# Patient Record
Sex: MALE | Race: WHITE | HISPANIC OR LATINO | Employment: OTHER | URBAN - METROPOLITAN AREA
[De-identification: names, ages, dates, MRNs, and addresses within clinical notes are randomized per-mention and may not be internally consistent; named-entity substitution may affect disease eponyms.]

---

## 2018-06-27 ENCOUNTER — TRANSFERRED RECORDS (OUTPATIENT)
Dept: HEALTH INFORMATION MANAGEMENT | Facility: CLINIC | Age: 58
End: 2018-06-27

## 2018-07-02 ENCOUNTER — TRANSFERRED RECORDS (OUTPATIENT)
Dept: HEALTH INFORMATION MANAGEMENT | Facility: CLINIC | Age: 58
End: 2018-07-02

## 2018-07-16 ENCOUNTER — APPOINTMENT (OUTPATIENT)
Dept: GENERAL RADIOLOGY | Facility: CLINIC | Age: 58
DRG: 270 | End: 2018-07-16
Attending: INTERNAL MEDICINE
Payer: COMMERCIAL

## 2018-07-16 ENCOUNTER — HOSPITAL ENCOUNTER (INPATIENT)
Facility: CLINIC | Age: 58
LOS: 8 days | Discharge: HOME | DRG: 270 | End: 2018-07-24
Attending: INTERNAL MEDICINE | Admitting: INTERNAL MEDICINE
Payer: COMMERCIAL

## 2018-07-16 DIAGNOSIS — E11.65 TYPE 2 DIABETES MELLITUS WITH HYPERGLYCEMIA, WITH LONG-TERM CURRENT USE OF INSULIN (H): ICD-10-CM

## 2018-07-16 DIAGNOSIS — I21.3 ST ELEVATION MYOCARDIAL INFARCTION (STEMI), UNSPECIFIED ARTERY (H): ICD-10-CM

## 2018-07-16 DIAGNOSIS — I50.21 ACUTE SYSTOLIC HEART FAILURE (H): Primary | ICD-10-CM

## 2018-07-16 DIAGNOSIS — Z79.4 TYPE 2 DIABETES MELLITUS WITH HYPERGLYCEMIA, WITH LONG-TERM CURRENT USE OF INSULIN (H): ICD-10-CM

## 2018-07-16 LAB
ALBUMIN SERPL-MCNC: 2.5 G/DL (ref 3.4–5)
ALP SERPL-CCNC: 170 U/L (ref 40–150)
ALT SERPL W P-5'-P-CCNC: 153 U/L (ref 0–70)
ANION GAP SERPL CALCULATED.3IONS-SCNC: 11 MMOL/L (ref 3–14)
APTT PPP: 76 SEC (ref 22–37)
AST SERPL W P-5'-P-CCNC: 100 U/L (ref 0–45)
BASE EXCESS BLDA CALC-SCNC: 17.4 MMOL/L
BASE EXCESS BLDV CALC-SCNC: 18.5 MMOL/L
BILIRUB SERPL-MCNC: 0.9 MG/DL (ref 0.2–1.3)
BUN SERPL-MCNC: 86 MG/DL (ref 7–30)
CALCIUM SERPL-MCNC: 8.9 MG/DL (ref 8.5–10.1)
CHLORIDE SERPL-SCNC: 87 MMOL/L (ref 94–109)
CO2 SERPL-SCNC: 35 MMOL/L (ref 20–32)
CREAT SERPL-MCNC: 2.79 MG/DL (ref 0.66–1.25)
CREAT SERPL-MCNC: 2.95 MG/DL (ref 0.7–1.2)
ERYTHROCYTE [DISTWIDTH] IN BLOOD BY AUTOMATED COUNT: 13.5 % (ref 10–15)
GFR SERPL CREATININE-BSD FRML MDRD: 22 ML/MIN/1.73M2
GFR SERPL CREATININE-BSD FRML MDRD: 24 ML/MIN/1.7M2
GLUCOSE BLDC GLUCOMTR-MCNC: 276 MG/DL (ref 70–99)
GLUCOSE SERPL-MCNC: 142 MG/DL (ref 70–100)
GLUCOSE SERPL-MCNC: 314 MG/DL (ref 70–99)
HCO3 BLD-SCNC: 42 MMOL/L (ref 21–28)
HCO3 BLDV-SCNC: 45 MMOL/L (ref 21–28)
HCT VFR BLD AUTO: 35.5 % (ref 40–53)
HGB BLD-MCNC: 11.5 G/DL (ref 13.3–17.7)
INR PPP: 1.22 (ref 0.86–1.14)
LMWH PPP CHRO-ACNC: 0.23 IU/ML
MAGNESIUM SERPL-MCNC: 2.7 MG/DL (ref 1.6–2.3)
MCH RBC QN AUTO: 29.2 PG (ref 26.5–33)
MCHC RBC AUTO-ENTMCNC: 32.4 G/DL (ref 31.5–36.5)
MCV RBC AUTO: 90 FL (ref 78–100)
NT-PROBNP SERPL-MCNC: 5062 PG/ML (ref 0–900)
O2/TOTAL GAS SETTING VFR VENT: ABNORMAL %
O2/TOTAL GAS SETTING VFR VENT: ABNORMAL %
PCO2 BLD: 50 MM HG (ref 35–45)
PCO2 BLDV: 58 MM HG (ref 40–50)
PH BLD: 7.54 PH (ref 7.35–7.45)
PH BLDV: 7.49 PH (ref 7.32–7.43)
PLATELET # BLD AUTO: 245 10E9/L (ref 150–450)
PO2 BLD: 99 MM HG (ref 80–105)
PO2 BLDV: 33 MM HG (ref 25–47)
POTASSIUM SERPL-SCNC: 3.9 MEQ/L (ref 3.4–5.1)
POTASSIUM SERPL-SCNC: 4 MMOL/L (ref 3.4–5.3)
PROT SERPL-MCNC: 7.2 G/DL (ref 6.8–8.8)
RBC # BLD AUTO: 3.94 10E12/L (ref 4.4–5.9)
SODIUM SERPL-SCNC: 133 MMOL/L (ref 133–144)
TROPONIN I SERPL-MCNC: 1.46 UG/L (ref 0–0.04)
WBC # BLD AUTO: 9.6 10E9/L (ref 4–11)

## 2018-07-16 PROCEDURE — 82803 BLOOD GASES ANY COMBINATION: CPT | Performed by: STUDENT IN AN ORGANIZED HEALTH CARE EDUCATION/TRAINING PROGRAM

## 2018-07-16 PROCEDURE — 74018 RADEX ABDOMEN 1 VIEW: CPT

## 2018-07-16 PROCEDURE — 25000128 H RX IP 250 OP 636: Performed by: INTERNAL MEDICINE

## 2018-07-16 PROCEDURE — 99223 1ST HOSP IP/OBS HIGH 75: CPT | Mod: AI | Performed by: INTERNAL MEDICINE

## 2018-07-16 PROCEDURE — 25000132 ZZH RX MED GY IP 250 OP 250 PS 637: Performed by: STUDENT IN AN ORGANIZED HEALTH CARE EDUCATION/TRAINING PROGRAM

## 2018-07-16 PROCEDURE — 85610 PROTHROMBIN TIME: CPT | Performed by: STUDENT IN AN ORGANIZED HEALTH CARE EDUCATION/TRAINING PROGRAM

## 2018-07-16 PROCEDURE — 40000081 ZZH STATISTIC INSERT IABP

## 2018-07-16 PROCEDURE — 20000004 ZZH R&B ICU UMMC

## 2018-07-16 PROCEDURE — 25000128 H RX IP 250 OP 636: Performed by: STUDENT IN AN ORGANIZED HEALTH CARE EDUCATION/TRAINING PROGRAM

## 2018-07-16 PROCEDURE — 83036 HEMOGLOBIN GLYCOSYLATED A1C: CPT | Performed by: STUDENT IN AN ORGANIZED HEALTH CARE EDUCATION/TRAINING PROGRAM

## 2018-07-16 PROCEDURE — 3E043XZ INTRODUCTION OF VASOPRESSOR INTO CENTRAL VEIN, PERCUTANEOUS APPROACH: ICD-10-PCS | Performed by: INTERNAL MEDICINE

## 2018-07-16 PROCEDURE — 71045 X-RAY EXAM CHEST 1 VIEW: CPT

## 2018-07-16 PROCEDURE — 40000281 ZZH STATISTIC TRANSPORT TIME EA 15 MIN

## 2018-07-16 PROCEDURE — 83880 ASSAY OF NATRIURETIC PEPTIDE: CPT | Performed by: STUDENT IN AN ORGANIZED HEALTH CARE EDUCATION/TRAINING PROGRAM

## 2018-07-16 PROCEDURE — 84484 ASSAY OF TROPONIN QUANT: CPT | Performed by: STUDENT IN AN ORGANIZED HEALTH CARE EDUCATION/TRAINING PROGRAM

## 2018-07-16 PROCEDURE — 85730 THROMBOPLASTIN TIME PARTIAL: CPT | Performed by: STUDENT IN AN ORGANIZED HEALTH CARE EDUCATION/TRAINING PROGRAM

## 2018-07-16 PROCEDURE — 40000076 ZZH STATISTIC IABP MONITORING

## 2018-07-16 PROCEDURE — 25000125 ZZHC RX 250: Performed by: INTERNAL MEDICINE

## 2018-07-16 PROCEDURE — 80053 COMPREHEN METABOLIC PANEL: CPT | Performed by: STUDENT IN AN ORGANIZED HEALTH CARE EDUCATION/TRAINING PROGRAM

## 2018-07-16 PROCEDURE — 85027 COMPLETE CBC AUTOMATED: CPT | Performed by: STUDENT IN AN ORGANIZED HEALTH CARE EDUCATION/TRAINING PROGRAM

## 2018-07-16 PROCEDURE — 85520 HEPARIN ASSAY: CPT | Performed by: STUDENT IN AN ORGANIZED HEALTH CARE EDUCATION/TRAINING PROGRAM

## 2018-07-16 PROCEDURE — 40000275 ZZH STATISTIC RCP TIME EA 10 MIN

## 2018-07-16 PROCEDURE — 00000146 ZZHCL STATISTIC GLUCOSE BY METER IP

## 2018-07-16 PROCEDURE — 83735 ASSAY OF MAGNESIUM: CPT | Performed by: STUDENT IN AN ORGANIZED HEALTH CARE EDUCATION/TRAINING PROGRAM

## 2018-07-16 PROCEDURE — 5A02210 ASSISTANCE WITH CARDIAC OUTPUT USING BALLOON PUMP, CONTINUOUS: ICD-10-PCS | Performed by: INTERNAL MEDICINE

## 2018-07-16 PROCEDURE — 84550 ASSAY OF BLOOD/URIC ACID: CPT | Performed by: STUDENT IN AN ORGANIZED HEALTH CARE EDUCATION/TRAINING PROGRAM

## 2018-07-16 RX ORDER — DEXTROSE MONOHYDRATE 25 G/50ML
25-50 INJECTION, SOLUTION INTRAVENOUS
Status: DISCONTINUED | OUTPATIENT
Start: 2018-07-16 | End: 2018-07-24 | Stop reason: HOSPADM

## 2018-07-16 RX ORDER — ACETAMINOPHEN 325 MG/1
650 TABLET ORAL EVERY 4 HOURS PRN
Status: DISCONTINUED | OUTPATIENT
Start: 2018-07-16 | End: 2018-07-24 | Stop reason: HOSPADM

## 2018-07-16 RX ORDER — HEPARIN SODIUM 10000 [USP'U]/100ML
0-3500 INJECTION, SOLUTION INTRAVENOUS CONTINUOUS
Status: DISCONTINUED | OUTPATIENT
Start: 2018-07-16 | End: 2018-07-16

## 2018-07-16 RX ORDER — AMOXICILLIN 250 MG
2 CAPSULE ORAL 2 TIMES DAILY
Status: DISCONTINUED | OUTPATIENT
Start: 2018-07-16 | End: 2018-07-16

## 2018-07-16 RX ORDER — AMOXICILLIN 250 MG
1 CAPSULE ORAL 2 TIMES DAILY
Status: DISCONTINUED | OUTPATIENT
Start: 2018-07-16 | End: 2018-07-16

## 2018-07-16 RX ORDER — ALUMINA, MAGNESIA, AND SIMETHICONE 2400; 2400; 240 MG/30ML; MG/30ML; MG/30ML
30 SUSPENSION ORAL EVERY 4 HOURS PRN
Status: DISCONTINUED | OUTPATIENT
Start: 2018-07-16 | End: 2018-07-16

## 2018-07-16 RX ORDER — CLOPIDOGREL BISULFATE 75 MG/1
75 TABLET ORAL DAILY
Status: DISCONTINUED | OUTPATIENT
Start: 2018-07-17 | End: 2018-07-24 | Stop reason: HOSPADM

## 2018-07-16 RX ORDER — HEPARIN SODIUM 10000 [USP'U]/100ML
0-3500 INJECTION, SOLUTION INTRAVENOUS CONTINUOUS
Status: DISCONTINUED | OUTPATIENT
Start: 2018-07-16 | End: 2018-07-18

## 2018-07-16 RX ORDER — NICOTINE POLACRILEX 4 MG
15-30 LOZENGE BUCCAL
Status: DISCONTINUED | OUTPATIENT
Start: 2018-07-16 | End: 2018-07-24 | Stop reason: HOSPADM

## 2018-07-16 RX ORDER — FAMOTIDINE 20 MG/1
20 TABLET, FILM COATED ORAL 2 TIMES DAILY
Status: DISCONTINUED | OUTPATIENT
Start: 2018-07-16 | End: 2018-07-18

## 2018-07-16 RX ORDER — MILRINONE LACTATE 0.2 MG/ML
0.25 INJECTION, SOLUTION INTRAVENOUS CONTINUOUS
Status: DISCONTINUED | OUTPATIENT
Start: 2018-07-16 | End: 2018-07-16

## 2018-07-16 RX ORDER — ONDANSETRON 4 MG/1
4 TABLET, ORALLY DISINTEGRATING ORAL EVERY 6 HOURS PRN
Status: DISCONTINUED | OUTPATIENT
Start: 2018-07-16 | End: 2018-07-16

## 2018-07-16 RX ORDER — ONDANSETRON 2 MG/ML
4 INJECTION INTRAMUSCULAR; INTRAVENOUS EVERY 6 HOURS PRN
Status: DISCONTINUED | OUTPATIENT
Start: 2018-07-16 | End: 2018-07-16

## 2018-07-16 RX ORDER — DOXYCYCLINE 100 MG/1
100 CAPSULE ORAL EVERY 12 HOURS SCHEDULED
Status: COMPLETED | OUTPATIENT
Start: 2018-07-16 | End: 2018-07-21

## 2018-07-16 RX ORDER — CEFDINIR 300 MG/1
300 CAPSULE ORAL EVERY 12 HOURS SCHEDULED
Status: COMPLETED | OUTPATIENT
Start: 2018-07-16 | End: 2018-07-21

## 2018-07-16 RX ORDER — LIDOCAINE 40 MG/G
CREAM TOPICAL
Status: DISCONTINUED | OUTPATIENT
Start: 2018-07-16 | End: 2018-07-24 | Stop reason: HOSPADM

## 2018-07-16 RX ADMIN — FUROSEMIDE 20 MG/HR: 10 INJECTION, SOLUTION INTRAVENOUS at 20:11

## 2018-07-16 RX ADMIN — CEFDINIR 300 MG: 300 CAPSULE ORAL at 22:15

## 2018-07-16 RX ADMIN — FAMOTIDINE 20 MG: 20 TABLET ORAL at 22:19

## 2018-07-16 RX ADMIN — AMIODARONE HYDROCHLORIDE 0.5 MG/MIN: 50 INJECTION, SOLUTION INTRAVENOUS at 20:12

## 2018-07-16 RX ADMIN — DOXYCYCLINE HYCLATE 100 MG: 100 CAPSULE ORAL at 22:14

## 2018-07-16 RX ADMIN — HEPARIN SODIUM 800 UNITS/HR: 10000 INJECTION, SOLUTION INTRAVENOUS at 20:00

## 2018-07-16 ASSESSMENT — ACTIVITIES OF DAILY LIVING (ADL)
DRESS: 0-->INDEPENDENT
SWALLOWING: 0-->SWALLOWS FOODS/LIQUIDS WITHOUT DIFFICULTY
FALL_HISTORY_WITHIN_LAST_SIX_MONTHS: NO
TOILETING: 0-->INDEPENDENT
COGNITION: 0 - NO COGNITION ISSUES REPORTED
RETIRED_COMMUNICATION: 0-->UNDERSTANDS/COMMUNICATES WITHOUT DIFFICULTY
TRANSFERRING: 0-->INDEPENDENT
AMBULATION: 0-->INDEPENDENT
RETIRED_EATING: 0-->INDEPENDENT
BATHING: 0-->INDEPENDENT

## 2018-07-16 NOTE — IP AVS SNAPSHOT
[address] [address]    Phone:  [phone]                                       After Visit Summary   7/16/2018    Mariusz Sharp    MRN: [id number]           After Visit Summary Signature Page     I have received my discharge instructions, and my questions have been answered. I have discussed any challenges I see with this plan with the nurse or doctor.    ..........................................................................................................................................  Patient/Patient Representative Signature      ..........................................................................................................................................  Patient Representative Print Name and Relationship to Patient    ..................................................               ................................................  Date                                            Time    ..........................................................................................................................................  Reviewed by Signature/Title    ...................................................              ..............................................  Date                                                            Time

## 2018-07-16 NOTE — IP AVS SNAPSHOT
MRN:[id number]                      After Visit Summary   7/16/2018    Mariusz Sharp    MRN: [id number]           Thank you!     Thank you for choosing [facility] for your care. Our goal is always to provide you with excellent care. Hearing back from our patients is one way we can continue to improve our services. Please take a few minutes to complete the written survey that you may receive in the mail after you visit with us. Thank you!        Patient Information     Date Of Birth          1960        Designated Caregiver       Most Recent Value    Caregiver    Will someone help with your care after discharge? yes    Name of designated caregiver Lisa    Phone number of caregiver [phone]    Caregiver address [place]      About your hospital stay     You were admitted on:  July 16, 2018 You last received care in the:  [address] [facility]    You were discharged on:  July 24, 2018        Reason for your hospital stay       Dear Mr. Sharp,    You were transferred to [facility] because you had a heart attack, which was complicated by heart failure leading to cardiogenic shock (when the heart doesn't pump enough blood to the rest of the body). We were able to stabilize you and get you started on your LVAD workup. We look forward to continuing to work with you. We will see you as an outpatient.     Thank you for your cooperation and partnership during this hospitalization.                  Who to Call     For medical emergencies, please call 911.  For non-urgent questions about your medical care, please call your primary care provider or clinic, [phone]          Attending Provider     Provider Specialty    Jenni Ortiz MD Cardiology       Primary Care Provider Office Phone # Fax #    [facility] [phone]      After Care Instructions     Activity       Your activity upon discharge: activity as tolerated            Diet       Follow  this diet upon discharge: Orders Placed This Encounter      Fluid restriction 2000 ML FLUID      Combination Diet 8445-1128 Calories: Moderate Consistent CHO (4-6 CHO units/meal); 2 gm NA Diet                  Follow-up Appointments     Adult [facility] Follow-up and recommended labs and tests       We will see you on 8/6 in heart failure clinic.     Appointments on [place] and/or [facility] (with [facility] or [facility] provider or service). Call [phone] if you haven't heard regarding these appointments within 7 days of discharge.            Follow Up and recommended labs and tests       ______________________________________________________    [facility]   [address]  [address]  [place] [address]  Phone: [phone]  Fax: [phone]    For INR and Warfarin monitoring (Goal=2-2.5).   Indication for Anticoagulation: Atrial Fibrillation and LV Thrombus.   Expected duration of therapy: Lifetime.    Like He to follow.     Appointment with Dr. Patricia Irving on 7/26/18 at 8:20 AM for INR lab draw, referral to INR nurse, and establishment of care.   ______________________________________________________            Follow Up and recommended labs and tests       You are scheduled to get routine labs on 8/6.                  Your next 10 appointments already scheduled     Aug 06, 2018  2:30 PM CDT   Lab with  LAB   [facility] ([facility])    [address]  1st [address]  [facility] [address] [phone]            Aug 06, 2018  3:00 PM CDT   (Arrive by 2:45 PM)   CORE NEW with Pippa Rodriguez NP   [facility] ([facility])    [address]  [address] [place] [address] [phone]              Additional Services     Cardiac Rehab Referral       *This therapy referral will be filtered to a centralized scheduling office at [facility] and the patient will receive a call to schedule  "an appointment at a [place] location most convenient for them.*     If you have not heard from the scheduling office within 2 business days, please call [phone] for all locations, with the exception of [place], please call [phone].    Please be aware that coverage of these services is subject to the terms and limitations of your health insurance plan.  Call member services at your health plan with any benefit or coverage questions.      **Note to Provider:  If you are referring outside of [place] for the therapy appointment, please list the name of the location in the \"special instructions\" above, print the referral and give to the patient to schedule the appointment.                         Further instructions from your care team       Diabetes Plan::  -Glargine: continue 20 units at bedtime ( lantus/glargine/basaglar, are all the same just different names)  -aspart ( Novolog) for meals and snacks:  1 unit per 12 grams of CHO  -Aspart ( Novolog)sliding scale  I   -179 give 1 unit.   -219 give 2 units.   -259 give 3 units.   -299 give 4 units.   -339 give 5 units.   BG >/= 340 give 6 units.     Do Not give Bedtime Correction Insulin if BG less than 200   -239 give 1 unit.   -279 give 2 units.   -319 give 3 units.   -359 give 4 units.   BG >/=360 give  5 units.       -test glucse before meals, bedtime and at 0200 ( if awake)      Warfarin Instruction     You have started taking a medicine called warfarin. This is a blood-thinning medicine (anticoagulant). It helps prevent and treat blood clots.      Before leaving the hospital, make sure you know how much to take and how long to take it.      You will need regular blood tests to make sure your blood is clotting safely. It is very important to see your doctor for regular blood tests.    Talk to your doctor before taking any new medicine (this includes over-the-counter drugs and herbal products). " "Many medicines can interact with warfarin. This may cause more bleeding or too much clotting.     Eating a lot of vitamin K--found in green, leafy vegetables--can change the way warfarin works in your body. Do NOT avoid these foods. Instead, try to eat the same amount each day.     Bleeding is the most common side-effect of warfarin. You may notice bleeding gums, a bloody nose, bruises and bleeding longer when you cut yourself. See a doctor at once if:   o You cough up blood  o You find blood in your stool (poop)  o You have a deep cut, or a cut that bleeds longer than 10 minutes   o You have a bad cut, hard fall, accident or hit your head (go to urgent care or the emergency room).    For women who can get pregnant: This medicine can harm an unborn baby. Be very careful not to get pregnant while taking this medicine. If you think you might be pregnant, call your doctor right away.    For more information, read \"Guide to Warfarin Therapy,  the booklet you received in the hospital.        General Recommendations To Control Heart Failure When You Get Home     Heart Failure Instructions for Patients and Families: Please read and check off each of these important instructions as you do them when you get home.     Weight and Symptoms    ___ Put a scale in your bathroom.    ___ Post a weight chart or calendar next to your scale.    ___ Weigh yourself everyday as soon as you get up in the morning (before breakfast).  You should only be wearing your pajamas.  Write your weight on the chart/calendar.    ___ Bring your weight chart/calendar with you to all appointments.    ___ Call your doctor or nurse practitioner if you gain 2 pounds (in 1 day) or 5 pounds in (1 week) from your goal  good  weight.  Your good weight is also called your  dry  weight.  Your doctor or nurse will tell you what your good weight should be.    ___ Call your doctor or nurse practitioner if you have shortness of breath that gets worse over time, leg " swelling or fatigue.    Medications and Diet    ___ Make sure to take your medication as prescribed.    ___ Bring a current list of your medication and all of your medicine bottles with you to all appointments.    ___ Limit fluids if you still have swelling or shortness of breath, or if your doctor tells you to do so.      ___ Eat less than 2000 mg of sodium (salt) every day. Read food labels, and do not add salt to meals. Remember, if you eat less salt you retain less fluid.    ___ Follow a heart healthy diet that is low in saturated fat.         Activity and Suggested Lifestyle Changes   ___ Stay active. Talk to your doctor about an exercise program that is safe for your heart.    ___ Stop smoking. Reduce alcohol use.      ___ Lose weight if you are overweight. Extra weight puts a lot of stress on the heart.    Control for Leg Swelling  ___ Keep your legs elevated (raised) as needed for swelling. If swelling is uncomfortable or elevation doesn t help, ask your doctor about using ACE wraps or support stockings.      What is the C.O.R.E. Clinic?     Cardiomyopathy  Optimization  Rehabilitation  Education    The C.O.R.E. Clinic is a heart failure specialty clinic within [facility].  It is an outpatient disease management program that is based on a phase-by-phase approach, which is tailored to each patient s individual needs.  The cardiologist, nurse practitioner, physician assistant and nurses provide an ongoing outpatient care and treatment plan that guides heart failure and cardiomyopathy patients from evaluation and education to stabilization. This team works with your current primary care doctor and cardiologist to help you:      Avoid hospitalizations    Slow the progression of the disease    Improve length and quality of life    Know who and when to call if heart failure symptoms appear    Receive easy access to quality health care and advice    Better understand your condition and  "treatment    Decrease the tremendous cost burden of heart failure care    Detect future heart problems before they become life threatening    Your C.O.R.E. Clinic Team will continue to educate you on your heart failure and may adjust medications based on your vital signs, lab work, and how you are feeling.  Therefore, it is very important to bring the following to all C.O.R.E. appointments:    - An accurate list of your medications  - Your medicine bottles  - Your weight chart/calendar    [facility] ([place]):    [phone]  [facility] ([place]):    [phone]  [facility] ([place]):  [phone]        Pending Results     Date and Time Order Name Status Description    7/22/2018 1238 US CHETAN Doppler No Exercise In process             Statement of Approval     Ordered          07/24/18 6629  I have reviewed and agree with all the recommendations and orders detailed in this document.  EFFECTIVE NOW     Approved and electronically signed by:  Vesta Walter MD             Admission Information     Date & Time Provider Department Dept. Phone    7/16/2018 Jenni Ortiz MD [address] [facility] [phone]      Your Vitals Were     Blood Pressure Pulse Temperature Respirations Height Weight    96/70 (BP Location: Left arm) 81 97.9  F (36.6  C) (Oral) 16 1.626 m (5' 4\") 77 kg (169 lb 12.8 oz)    Pulse Oximetry BMI (Body Mass Index)                97% 29.15 kg/m2          [employer] Information     [employer] lets you send messages to your doctor, view your test results, renew your prescriptions, schedule appointments and more. To sign up, go to [email or web address] . Click on \"Log in\" on the left side of the screen, which will take you to the Welcome page. Then click on \"Sign up Now\" on the right side of the page.     You will be asked to enter the access code listed below, as well as some personal information. Please follow the directions " to create your username and password.     Your access code is: [email or web address]  Expires: 10/15/2018  5:33 PM     Your access code will  in 90 days. If you need help or a new code, please call your [facility] clinic or [phone].        Care EveryWhere ID     This is your Care EveryWhere ID. This could be used by other organizations to access your [facility] medical records  [id number]        Equal Access to Services     GILLIAN COX : Hadii can ku hadasho Soomaali, waaxda luqadaha, qaybta kaalmada adeegyada, waxay lynnettein haytyronen debra garibayfrankiefernanda bar . So [facility] [phone].    ATENCIÓN: Si habla español, tiene a waddell disposición servicios gratuitos de asistencia lingüística. LlACMC [facility] [phone].    We comply with applicable federal civil rights laws and [place] laws. We do not discriminate on the basis of race, color, national origin, age, disability, sex, sexual orientation, or gender identity.               Review of your medicines      START taking        Dose / Directions    digoxin 125 MCG tablet   Commonly known as:  LANOXIN        Dose:  125 mcg   Start taking on:  2018   Take 1 tablet (125 mcg) by mouth daily   Quantity:  30 tablet   Refills:  11       hydrALAZINE 50 MG tablet   Commonly known as:  APRESOLINE        Dose:  50 mg   Take 1 tablet (50 mg) by mouth 3 times daily   Quantity:  120 tablet   Refills:  11       isosorbide mononitrate 30 MG 24 hr tablet   Commonly known as:  IMDUR        Dose:  30 mg   Start taking on:  2018   Take 1 tablet (30 mg) by mouth daily   Quantity:  30 tablet   Refills:  11       rosuvastatin 20 MG tablet   Commonly known as:  CRESTOR        Dose:  20 mg   Take 1 tablet (20 mg) by mouth At Bedtime   Quantity:  30 tablet   Refills:  11         CONTINUE these medicines which may have CHANGED, or have new prescriptions. If we are uncertain of the size of tablets/capsules you have at home, strength may be listed as something that might have changed.        Dose /  Directions    amiodarone 200 MG tablet   Commonly known as:  PACERONE/CODARONE   This may have changed:    - how much to take  - when to take this        Dose:  200 mg   Start taking on:  7/25/2018   Take 1 tablet (200 mg) by mouth daily   Quantity:  60 tablet   Refills:  11       insulin glargine 100 UNIT/ML injection   Commonly known as:  LANTUS   This may have changed:    - how much to take  - when to take this        Dose:  20 Units   Inject 20 Units Subcutaneous every evening   Quantity:  3 mL   Refills:  11       metoprolol succinate 25 MG 24 hr tablet   Commonly known as:  TOPROL-XL   This may have changed:  how much to take        Dose:  12.5 mg   Start taking on:  7/25/2018   Take 0.5 tablets (12.5 mg) by mouth daily   Quantity:  30 tablet   Refills:  11         CONTINUE these medicines which have NOT CHANGED        Dose / Directions    aspirin 81 MG tablet        Dose:  81 mg   Take 81 mg by mouth daily   Refills:  0       clopidogrel 75 MG tablet   Commonly known as:  PLAVIX        Dose:  75 mg   Take 75 mg by mouth daily   Refills:  0       famotidine 20 MG tablet   Commonly known as:  PEPCID        Dose:  20 mg   Take 20 mg by mouth 2 times daily   Refills:  0       insulin aspart 100 UNIT/ML injection   Commonly known as:  NovoLOG PEN        Less than 120 mg/dL = 0 Units 120-149 mg/dL = 1 Unit 150-199 mg/dL = 2 Units 200-249 mg/dL = 3 Units 250-299 mg/dL = 5 Units 300-349 mg/dL = 7 Units 350 mg/dL or greater = 8 Units   Refills:  0       metFORMIN 500 MG tablet   Commonly known as:  GLUCOPHAGE        Dose:  500 mg   Take 500 mg by mouth 2 times daily (with meals)   Refills:  0       NITROSTAT 0.4 MG sublingual tablet   Generic drug:  nitroGLYcerin        Dose:  0.4 mg   Place 0.4 mg under the tongue every 5 minutes as needed for chest pain For chest pain place 1 tablet under the tongue every 5 minutes for 3 doses. If symptoms persist 5 minutes after 1st dose call 911.   Refills:  0       warfarin 2 MG  tablet   Commonly known as:  COUMADIN        Dose:  4 mg   Take 4 mg by mouth daily On 7/1 and 7/2, then recheck INR at [facility] and take as directed   Refills:  0         STOP taking     atorvastatin 80 MG tablet   Commonly known as:  LIPITOR           cefdinir 300 MG capsule   Commonly known as:  OMNICEF           doxycycline monohydrate 100 MG capsule           lisinopril 2.5 MG tablet   Commonly known as:  PRINIVIL/Zestril                Where to get your medicines      These medications were sent to [address] AT [address] [address]     Phone:  [phone]     amiodarone 200 MG tablet    digoxin 125 MCG tablet    hydrALAZINE 50 MG tablet    insulin glargine 100 UNIT/ML injection    isosorbide mononitrate 30 MG 24 hr tablet    metoprolol succinate 25 MG 24 hr tablet    rosuvastatin 20 MG tablet                Protect others around you: Learn how to safely use, store and throw away your medicines at www.disposemymeds.org.             Medication List: This is a list of all your medications and when to take them. Check marks below indicate your daily home schedule. Keep this list as a reference.      Medications           Morning Afternoon Evening Bedtime As Needed    amiodarone 200 MG tablet   Commonly known as:  PACERONE/CODARONE   Take 1 tablet (200 mg) by mouth daily   Start taking on:  7/25/2018   Last time this was given:  200 mg on 7/24/2018  8:02 AM                                   aspirin 81 MG tablet   Take 81 mg by mouth daily                                   clopidogrel 75 MG tablet   Commonly known as:  PLAVIX   Take 75 mg by mouth daily   Last time this was given:  75 mg on 7/24/2018  8:02 AM                                   digoxin 125 MCG tablet   Commonly known as:  LANOXIN   Take 1 tablet (125 mcg) by mouth daily   Start taking on:  7/25/2018   Last time this was  given:  125 mcg on 7/24/2018  8:02 AM                                   famotidine 20 MG tablet   Commonly known as:  PEPCID   Take 20 mg by mouth 2 times daily   Last time this was given:  20 mg on 7/24/2018  8:01 AM                                      hydrALAZINE 50 MG tablet   Commonly known as:  APRESOLINE   Take 1 tablet (50 mg) by mouth 3 times daily   Last time this was given:  50 mg on 7/24/2018  2:16 PM                                         insulin aspart 100 UNIT/ML injection   Commonly known as:  NovoLOG PEN   Less than 120 mg/dL = 0 Units 120-149 mg/dL = 1 Unit 150-199 mg/dL = 2 Units 200-249 mg/dL = 3 Units 250-299 mg/dL = 5 Units 300-349 mg/dL = 7 Units 350 mg/dL or greater = 8 Units   Last time this was given:  4 Units on 7/24/2018 12:35 PM                                            insulin glargine 100 UNIT/ML injection   Commonly known as:  LANTUS   Inject 20 Units Subcutaneous every evening   Last time this was given:  20 Units on 7/23/2018  8:17 PM                                   isosorbide mononitrate 30 MG 24 hr tablet   Commonly known as:  IMDUR   Take 1 tablet (30 mg) by mouth daily   Start taking on:  7/25/2018   Last time this was given:  30 mg on 7/24/2018  8:03 AM                                   metFORMIN 500 MG tablet   Commonly known as:  GLUCOPHAGE   Take 500 mg by mouth 2 times daily (with meals)                                      metoprolol succinate 25 MG 24 hr tablet   Commonly known as:  TOPROL-XL   Take 0.5 tablets (12.5 mg) by mouth daily   Start taking on:  7/25/2018   Last time this was given:  12.5 mg on 7/24/2018  8:02 AM                                   NITROSTAT 0.4 MG sublingual tablet   Place 0.4 mg under the tongue every 5 minutes as needed for chest pain For chest pain place 1 tablet under the tongue every 5 minutes for 3 doses. If symptoms persist 5 minutes after 1st dose call 911.   Generic drug:  nitroGLYcerin                                   rosuvastatin  20 MG tablet   Commonly known as:  CRESTOR   Take 1 tablet (20 mg) by mouth At Bedtime   Last time this was given:  20 mg on 7/23/2018  9:48 PM                                   warfarin 2 MG tablet   Commonly known as:  COUMADIN   Take 4 mg by mouth daily On 7/1 and 7/2, then rec[facility]ck INR at [facility] and take as directed   Last time this was given:  10 mg on 7/23/2018  7:26 PM

## 2018-07-17 ENCOUNTER — ALLIED HEALTH/NURSE VISIT (OUTPATIENT)
Dept: CARDIOLOGY | Facility: CLINIC | Age: 58
DRG: 270 | End: 2018-07-17
Attending: NURSE PRACTITIONER
Payer: COMMERCIAL

## 2018-07-17 DIAGNOSIS — Z95.810 ICD (IMPLANTABLE CARDIOVERTER-DEFIBRILLATOR), BIVENTRICULAR, IN SITU: ICD-10-CM

## 2018-07-17 DIAGNOSIS — I50.9 HEART FAILURE (H): Primary | ICD-10-CM

## 2018-07-17 LAB
ALBUMIN SERPL-MCNC: 2.6 G/DL (ref 3.4–5)
ALP SERPL-CCNC: 171 U/L (ref 40–150)
ALT SERPL W P-5'-P-CCNC: 142 U/L (ref 0–70)
ANION GAP SERPL CALCULATED.3IONS-SCNC: 11 MMOL/L (ref 3–14)
ANION GAP SERPL CALCULATED.3IONS-SCNC: 4 MMOL/L (ref 3–14)
ANION GAP SERPL CALCULATED.3IONS-SCNC: 8 MMOL/L (ref 3–14)
APTT PPP: 59 SEC (ref 22–37)
AST SERPL W P-5'-P-CCNC: 80 U/L (ref 0–45)
BASE EXCESS BLDV CALC-SCNC: 15.2 MMOL/L
BASE EXCESS BLDV CALC-SCNC: 15.3 MMOL/L
BASE EXCESS BLDV CALC-SCNC: 15.5 MMOL/L
BASE EXCESS BLDV CALC-SCNC: 15.6 MMOL/L
BASE EXCESS BLDV CALC-SCNC: 15.8 MMOL/L
BASE EXCESS BLDV CALC-SCNC: 18.4 MMOL/L
BASE EXCESS BLDV CALC-SCNC: 19 MMOL/L
BASOPHILS # BLD AUTO: 0 10E9/L (ref 0–0.2)
BASOPHILS NFR BLD AUTO: 0.4 %
BILIRUB SERPL-MCNC: 1 MG/DL (ref 0.2–1.3)
BUN SERPL-MCNC: 80 MG/DL (ref 7–30)
BUN SERPL-MCNC: 80 MG/DL (ref 7–30)
BUN SERPL-MCNC: 86 MG/DL (ref 7–30)
CALCIUM SERPL-MCNC: 8.2 MG/DL (ref 8.5–10.1)
CALCIUM SERPL-MCNC: 8.9 MG/DL (ref 8.5–10.1)
CALCIUM SERPL-MCNC: 9.3 MG/DL (ref 8.5–10.1)
CHLORIDE SERPL-SCNC: 85 MMOL/L (ref 94–109)
CHLORIDE SERPL-SCNC: 86 MMOL/L (ref 94–109)
CHLORIDE SERPL-SCNC: 91 MMOL/L (ref 94–109)
CO2 SERPL-SCNC: 35 MMOL/L (ref 20–32)
CO2 SERPL-SCNC: 37 MMOL/L (ref 20–32)
CO2 SERPL-SCNC: 43 MMOL/L (ref 20–32)
CREAT SERPL-MCNC: 2.61 MG/DL (ref 0.66–1.25)
CREAT SERPL-MCNC: 2.71 MG/DL (ref 0.66–1.25)
CREAT SERPL-MCNC: 2.72 MG/DL (ref 0.66–1.25)
DIFFERENTIAL METHOD BLD: ABNORMAL
EOSINOPHIL # BLD AUTO: 0.2 10E9/L (ref 0–0.7)
EOSINOPHIL NFR BLD AUTO: 2.1 %
ERYTHROCYTE [DISTWIDTH] IN BLOOD BY AUTOMATED COUNT: 13.6 % (ref 10–15)
ERYTHROCYTE [DISTWIDTH] IN BLOOD BY AUTOMATED COUNT: 13.7 % (ref 10–15)
GFR SERPL CREATININE-BSD FRML MDRD: 24 ML/MIN/1.7M2
GFR SERPL CREATININE-BSD FRML MDRD: 24 ML/MIN/1.7M2
GFR SERPL CREATININE-BSD FRML MDRD: 26 ML/MIN/1.7M2
GLUCOSE BLDC GLUCOMTR-MCNC: 225 MG/DL (ref 70–99)
GLUCOSE BLDC GLUCOMTR-MCNC: 227 MG/DL (ref 70–99)
GLUCOSE BLDC GLUCOMTR-MCNC: 256 MG/DL (ref 70–99)
GLUCOSE BLDC GLUCOMTR-MCNC: 287 MG/DL (ref 70–99)
GLUCOSE BLDC GLUCOMTR-MCNC: 324 MG/DL (ref 70–99)
GLUCOSE BLDC GLUCOMTR-MCNC: 417 MG/DL (ref 70–99)
GLUCOSE SERPL-MCNC: 242 MG/DL (ref 70–99)
GLUCOSE SERPL-MCNC: 249 MG/DL (ref 70–99)
GLUCOSE SERPL-MCNC: 336 MG/DL (ref 70–99)
HBA1C MFR BLD: 11.9 % (ref 0–5.6)
HBA1C MFR BLD: 12.3 % (ref 0–5.6)
HCO3 BLDV-SCNC: 41 MMOL/L (ref 21–28)
HCO3 BLDV-SCNC: 41 MMOL/L (ref 21–28)
HCO3 BLDV-SCNC: 42 MMOL/L (ref 21–28)
HCO3 BLDV-SCNC: 45 MMOL/L (ref 21–28)
HCO3 BLDV-SCNC: 45 MMOL/L (ref 21–28)
HCT VFR BLD AUTO: 36.9 % (ref 40–53)
HCT VFR BLD AUTO: 37.3 % (ref 40–53)
HGB BLD-MCNC: 11.3 G/DL (ref 13.3–17.7)
HGB BLD-MCNC: 11.7 G/DL (ref 13.3–17.7)
HGB BLD-MCNC: 11.8 G/DL (ref 13.3–17.7)
IMM GRANULOCYTES # BLD: 0 10E9/L (ref 0–0.4)
IMM GRANULOCYTES NFR BLD: 0.1 %
INR PPP: 1.23 (ref 0.86–1.14)
INTERPRETATION ECG - MUSE: NORMAL
LACTATE BLD-SCNC: 0.8 MMOL/L (ref 0.7–2)
LMWH PPP CHRO-ACNC: 0.17 IU/ML
LMWH PPP CHRO-ACNC: 0.21 IU/ML
LMWH PPP CHRO-ACNC: 0.37 IU/ML
LYMPHOCYTES # BLD AUTO: 1.6 10E9/L (ref 0.8–5.3)
LYMPHOCYTES NFR BLD AUTO: 16.7 %
MAGNESIUM SERPL-MCNC: 2.4 MG/DL (ref 1.6–2.3)
MAGNESIUM SERPL-MCNC: 2.4 MG/DL (ref 1.6–2.3)
MAGNESIUM SERPL-MCNC: 2.5 MG/DL (ref 1.6–2.3)
MCH RBC QN AUTO: 28.7 PG (ref 26.5–33)
MCH RBC QN AUTO: 29 PG (ref 26.5–33)
MCHC RBC AUTO-ENTMCNC: 31.4 G/DL (ref 31.5–36.5)
MCHC RBC AUTO-ENTMCNC: 32 G/DL (ref 31.5–36.5)
MCV RBC AUTO: 91 FL (ref 78–100)
MCV RBC AUTO: 91 FL (ref 78–100)
MONOCYTES # BLD AUTO: 0.8 10E9/L (ref 0–1.3)
MONOCYTES NFR BLD AUTO: 8.9 %
NEUTROPHILS # BLD AUTO: 6.8 10E9/L (ref 1.6–8.3)
NEUTROPHILS NFR BLD AUTO: 71.8 %
NRBC # BLD AUTO: 0 10*3/UL
NRBC BLD AUTO-RTO: 0 /100
O2/TOTAL GAS SETTING VFR VENT: ABNORMAL %
OXYHGB MFR BLDV: 53 %
OXYHGB MFR BLDV: 54 %
OXYHGB MFR BLDV: 54 %
OXYHGB MFR BLDV: 55 %
OXYHGB MFR BLDV: 57 %
OXYHGB MFR BLDV: 58 %
OXYHGB MFR BLDV: 58 %
PCO2 BLDV: 52 MM HG (ref 40–50)
PCO2 BLDV: 54 MM HG (ref 40–50)
PCO2 BLDV: 54 MM HG (ref 40–50)
PCO2 BLDV: 56 MM HG (ref 40–50)
PCO2 BLDV: 56 MM HG (ref 40–50)
PCO2 BLDV: 57 MM HG (ref 40–50)
PCO2 BLDV: 59 MM HG (ref 40–50)
PH BLDV: 7.47 PH (ref 7.32–7.43)
PH BLDV: 7.48 PH (ref 7.32–7.43)
PH BLDV: 7.49 PH (ref 7.32–7.43)
PH BLDV: 7.5 PH (ref 7.32–7.43)
PH BLDV: 7.51 PH (ref 7.32–7.43)
PHOSPHATE SERPL-MCNC: 5.4 MG/DL (ref 2.5–4.5)
PLATELET # BLD AUTO: 222 10E9/L (ref 150–450)
PLATELET # BLD AUTO: 233 10E9/L (ref 150–450)
PO2 BLDV: 30 MM HG (ref 25–47)
PO2 BLDV: 31 MM HG (ref 25–47)
PO2 BLDV: 32 MM HG (ref 25–47)
POTASSIUM BLD-SCNC: 4 MMOL/L (ref 3.4–5.3)
POTASSIUM SERPL-SCNC: 3.9 MMOL/L (ref 3.4–5.3)
POTASSIUM SERPL-SCNC: 3.9 MMOL/L (ref 3.4–5.3)
POTASSIUM SERPL-SCNC: 4.1 MMOL/L (ref 3.4–5.3)
PROT SERPL-MCNC: 7.2 G/DL (ref 6.8–8.8)
RBC # BLD AUTO: 4.07 10E12/L (ref 4.4–5.9)
RBC # BLD AUTO: 4.08 10E12/L (ref 4.4–5.9)
SODIUM SERPL-SCNC: 132 MMOL/L (ref 133–144)
SODIUM SERPL-SCNC: 134 MMOL/L (ref 133–144)
SODIUM SERPL-SCNC: 135 MMOL/L (ref 133–144)
URATE SERPL-MCNC: 7.7 MG/DL (ref 3.5–7.2)
URATE SERPL-MCNC: 7.9 MG/DL (ref 3.5–7.2)
WBC # BLD AUTO: 10 10E9/L (ref 4–11)
WBC # BLD AUTO: 9.4 10E9/L (ref 4–11)

## 2018-07-17 PROCEDURE — 82805 BLOOD GASES W/O2 SATURATION: CPT | Performed by: INTERNAL MEDICINE

## 2018-07-17 PROCEDURE — 40000076 ZZH STATISTIC IABP MONITORING

## 2018-07-17 PROCEDURE — 93005 ELECTROCARDIOGRAM TRACING: CPT

## 2018-07-17 PROCEDURE — 93010 ELECTROCARDIOGRAM REPORT: CPT | Performed by: INTERNAL MEDICINE

## 2018-07-17 PROCEDURE — 40000196 ZZH STATISTIC RAPCV CVP MONITORING

## 2018-07-17 PROCEDURE — 85025 COMPLETE CBC W/AUTO DIFF WBC: CPT | Performed by: INTERNAL MEDICINE

## 2018-07-17 PROCEDURE — 80048 BASIC METABOLIC PNL TOTAL CA: CPT

## 2018-07-17 PROCEDURE — 00000146 ZZHCL STATISTIC GLUCOSE BY METER IP

## 2018-07-17 PROCEDURE — 25000128 H RX IP 250 OP 636: Performed by: INTERNAL MEDICINE

## 2018-07-17 PROCEDURE — 83036 HEMOGLOBIN GLYCOSYLATED A1C: CPT | Performed by: STUDENT IN AN ORGANIZED HEALTH CARE EDUCATION/TRAINING PROGRAM

## 2018-07-17 PROCEDURE — 84550 ASSAY OF BLOOD/URIC ACID: CPT | Performed by: STUDENT IN AN ORGANIZED HEALTH CARE EDUCATION/TRAINING PROGRAM

## 2018-07-17 PROCEDURE — 85018 HEMOGLOBIN: CPT

## 2018-07-17 PROCEDURE — 40000014 ZZH STATISTIC ARTERIAL MONITORING DAILY

## 2018-07-17 PROCEDURE — 85520 HEPARIN ASSAY: CPT | Performed by: INTERNAL MEDICINE

## 2018-07-17 PROCEDURE — 25000128 H RX IP 250 OP 636: Performed by: STUDENT IN AN ORGANIZED HEALTH CARE EDUCATION/TRAINING PROGRAM

## 2018-07-17 PROCEDURE — 99291 CRITICAL CARE FIRST HOUR: CPT | Mod: 25 | Performed by: INTERNAL MEDICINE

## 2018-07-17 PROCEDURE — 85027 COMPLETE CBC AUTOMATED: CPT | Performed by: STUDENT IN AN ORGANIZED HEALTH CARE EDUCATION/TRAINING PROGRAM

## 2018-07-17 PROCEDURE — 84132 ASSAY OF SERUM POTASSIUM: CPT | Performed by: INTERNAL MEDICINE

## 2018-07-17 PROCEDURE — 40000275 ZZH STATISTIC RCP TIME EA 10 MIN

## 2018-07-17 PROCEDURE — 80053 COMPREHEN METABOLIC PANEL: CPT | Performed by: STUDENT IN AN ORGANIZED HEALTH CARE EDUCATION/TRAINING PROGRAM

## 2018-07-17 PROCEDURE — 80048 BASIC METABOLIC PNL TOTAL CA: CPT | Performed by: INTERNAL MEDICINE

## 2018-07-17 PROCEDURE — 85520 HEPARIN ASSAY: CPT

## 2018-07-17 PROCEDURE — 83735 ASSAY OF MAGNESIUM: CPT | Performed by: STUDENT IN AN ORGANIZED HEALTH CARE EDUCATION/TRAINING PROGRAM

## 2018-07-17 PROCEDURE — 85730 THROMBOPLASTIN TIME PARTIAL: CPT

## 2018-07-17 PROCEDURE — 83735 ASSAY OF MAGNESIUM: CPT | Performed by: INTERNAL MEDICINE

## 2018-07-17 PROCEDURE — 25000132 ZZH RX MED GY IP 250 OP 250 PS 637: Performed by: STUDENT IN AN ORGANIZED HEALTH CARE EDUCATION/TRAINING PROGRAM

## 2018-07-17 PROCEDURE — 99292 CRITICAL CARE ADDL 30 MIN: CPT | Mod: 25 | Performed by: INTERNAL MEDICINE

## 2018-07-17 PROCEDURE — 84100 ASSAY OF PHOSPHORUS: CPT | Performed by: INTERNAL MEDICINE

## 2018-07-17 PROCEDURE — 99223 1ST HOSP IP/OBS HIGH 75: CPT | Mod: 25 | Performed by: INTERNAL MEDICINE

## 2018-07-17 PROCEDURE — 25000131 ZZH RX MED GY IP 250 OP 636 PS 637: Performed by: STUDENT IN AN ORGANIZED HEALTH CARE EDUCATION/TRAINING PROGRAM

## 2018-07-17 PROCEDURE — 85520 HEPARIN ASSAY: CPT | Performed by: STUDENT IN AN ORGANIZED HEALTH CARE EDUCATION/TRAINING PROGRAM

## 2018-07-17 PROCEDURE — 93282 PRGRMG EVAL IMPLANTABLE DFB: CPT | Mod: 26 | Performed by: INTERNAL MEDICINE

## 2018-07-17 PROCEDURE — 25000131 ZZH RX MED GY IP 250 OP 636 PS 637: Performed by: INTERNAL MEDICINE

## 2018-07-17 PROCEDURE — 85610 PROTHROMBIN TIME: CPT

## 2018-07-17 PROCEDURE — 20000004 ZZH R&B ICU UMMC

## 2018-07-17 PROCEDURE — 93282 PRGRMG EVAL IMPLANTABLE DFB: CPT | Mod: ZF

## 2018-07-17 PROCEDURE — 83605 ASSAY OF LACTIC ACID: CPT | Performed by: INTERNAL MEDICINE

## 2018-07-17 PROCEDURE — 40000048 ZZH STATISTIC DAILY SWAN MONITORING

## 2018-07-17 RX ORDER — MILRINONE LACTATE 0.2 MG/ML
0.25 INJECTION, SOLUTION INTRAVENOUS CONTINUOUS
Status: ON HOLD | COMMUNITY
Start: 2018-07-16 | End: 2018-07-17

## 2018-07-17 RX ORDER — LISINOPRIL 2.5 MG/1
2.5 TABLET ORAL DAILY
Status: ON HOLD | COMMUNITY
Start: 2018-06-29 | End: 2018-07-24

## 2018-07-17 RX ORDER — POTASSIUM CHLORIDE 750 MG/1
10 TABLET, EXTENDED RELEASE ORAL ONCE
Status: COMPLETED | OUTPATIENT
Start: 2018-07-17 | End: 2018-07-17

## 2018-07-17 RX ORDER — WARFARIN SODIUM 2 MG/1
4 TABLET ORAL DAILY
Status: ON HOLD | COMMUNITY
Start: 2018-07-01 | End: 2019-01-10

## 2018-07-17 RX ORDER — FAMOTIDINE 20 MG/1
20 TABLET, FILM COATED ORAL 2 TIMES DAILY
Status: ON HOLD | COMMUNITY
Start: 2018-06-29 | End: 2019-01-15

## 2018-07-17 RX ORDER — AMIODARONE HYDROCHLORIDE 200 MG/1
400 TABLET ORAL 2 TIMES DAILY
Status: ON HOLD | COMMUNITY
Start: 2018-07-09 | End: 2018-07-24

## 2018-07-17 RX ORDER — ATORVASTATIN CALCIUM 80 MG/1
80 TABLET, FILM COATED ORAL DAILY
Status: ON HOLD | COMMUNITY
Start: 2018-06-29 | End: 2018-07-24

## 2018-07-17 RX ORDER — DOBUTAMINE HYDROCHLORIDE 200 MG/100ML
2.5 INJECTION INTRAVENOUS CONTINUOUS
Status: DISCONTINUED | OUTPATIENT
Start: 2018-07-17 | End: 2018-07-19

## 2018-07-17 RX ORDER — NITROGLYCERIN 0.4 MG/1
0.4 TABLET SUBLINGUAL EVERY 5 MIN PRN
Status: ON HOLD | COMMUNITY
Start: 2018-06-29 | End: 2019-01-15

## 2018-07-17 RX ORDER — CLOPIDOGREL BISULFATE 75 MG/1
75 TABLET ORAL DAILY
Status: ON HOLD | COMMUNITY
Start: 2018-06-30 | End: 2019-01-09

## 2018-07-17 RX ORDER — CEFDINIR 300 MG/1
300 CAPSULE ORAL EVERY 12 HOURS
Status: ON HOLD | COMMUNITY
Start: 2018-07-16 | End: 2018-07-24

## 2018-07-17 RX ORDER — METOPROLOL SUCCINATE 25 MG/1
25 TABLET, EXTENDED RELEASE ORAL DAILY
Status: ON HOLD | COMMUNITY
Start: 2018-06-29 | End: 2018-07-24

## 2018-07-17 RX ORDER — DOXYCYCLINE 100 MG/1
100 CAPSULE ORAL 2 TIMES DAILY
Status: ON HOLD | COMMUNITY
Start: 2018-07-16 | End: 2018-07-24

## 2018-07-17 RX ADMIN — CEFDINIR 300 MG: 300 CAPSULE ORAL at 07:53

## 2018-07-17 RX ADMIN — INSULIN ASPART 4 UNITS: 100 INJECTION, SOLUTION INTRAVENOUS; SUBCUTANEOUS at 17:26

## 2018-07-17 RX ADMIN — CEFDINIR 300 MG: 300 CAPSULE ORAL at 20:50

## 2018-07-17 RX ADMIN — INSULIN GLARGINE 20 UNITS: 100 INJECTION, SOLUTION SUBCUTANEOUS at 22:26

## 2018-07-17 RX ADMIN — CLOPIDOGREL 75 MG: 75 TABLET, FILM COATED ORAL at 07:53

## 2018-07-17 RX ADMIN — INSULIN ASPART 2 UNITS: 100 INJECTION, SOLUTION INTRAVENOUS; SUBCUTANEOUS at 08:23

## 2018-07-17 RX ADMIN — DOXYCYCLINE HYCLATE 100 MG: 100 CAPSULE ORAL at 20:50

## 2018-07-17 RX ADMIN — DOBUTAMINE IN DEXTROSE 2.5 MCG/KG/MIN: 200 INJECTION, SOLUTION INTRAVENOUS at 05:43

## 2018-07-17 RX ADMIN — INSULIN ASPART 6 UNITS: 100 INJECTION, SOLUTION INTRAVENOUS; SUBCUTANEOUS at 20:53

## 2018-07-17 RX ADMIN — FAMOTIDINE 20 MG: 20 TABLET ORAL at 20:50

## 2018-07-17 RX ADMIN — INSULIN ASPART 2 UNITS: 100 INJECTION, SOLUTION INTRAVENOUS; SUBCUTANEOUS at 01:08

## 2018-07-17 RX ADMIN — SODIUM NITROPRUSSIDE 0.25 MCG/KG/MIN: 25 INJECTION INTRAVENOUS at 15:06

## 2018-07-17 RX ADMIN — DOXYCYCLINE HYCLATE 100 MG: 100 CAPSULE ORAL at 07:53

## 2018-07-17 RX ADMIN — FAMOTIDINE 20 MG: 20 TABLET ORAL at 07:53

## 2018-07-17 RX ADMIN — POTASSIUM CHLORIDE 10 MEQ: 750 TABLET, EXTENDED RELEASE ORAL at 05:46

## 2018-07-17 RX ADMIN — INSULIN ASPART 3 UNITS: 100 INJECTION, SOLUTION INTRAVENOUS; SUBCUTANEOUS at 04:21

## 2018-07-17 RX ADMIN — INSULIN ASPART 3 UNITS: 100 INJECTION, SOLUTION INTRAVENOUS; SUBCUTANEOUS at 12:53

## 2018-07-17 RX ADMIN — HEPARIN SODIUM 650 UNITS/HR: 10000 INJECTION, SOLUTION INTRAVENOUS at 05:27

## 2018-07-17 ASSESSMENT — ACTIVITIES OF DAILY LIVING (ADL)
ADLS_ACUITY_SCORE: 9

## 2018-07-17 NOTE — CONSULTS
Electrophysiology Consultation Note   EP Attending: .   Reason for consultation: ICD Shocks.   Provider requesting consultation: , Cardiology II Service .  Date of Service: 7/17/2018      HPI:   Mr. Sharp is a 57 year old male who has a past medical history significant for DM2, CKD, and recent STEMI 6/27/18 s/p PCI ALYSSA ostial-mid LAD.     On 6/23/18, he developed chest pain that gradually worsened prompting him to go in to local ER for evaluation on 6/27/18. He was found to have STEMI and was taken emergently to cath lab. Coronary angiogram showed LAD lesion s/p angioplasty and stenting using overlapping 3.5 X 12 and 3.0 X 30 mm Resolute Abdifatah ALYSSA post dialted to 4.0 mm to ostial to mid LAD. An echo showed a large sized apical, septal, anteroseptal, anterior, inferior, posterior, and lateral wall motion abnormality with hypokinesis to akinesis of the segments, LVEF 25%, moderate grade II diastolic dysfunction with elevated filling pressures, normal RV size and function, and mild MR and TR. He was discharged on ASA, Plavix, Warfarin, Toprol XL, and a statin. He then represented to [facility] ER on 7/2/18 reporting hematuria. He was found to be tachycardic 170-180 bpm and hypotensive. He reported having some RADER and felt a little clammy, but otherwise asymptomatic. He was found to be in WCT at 179 bpm initially thought to be SVT with aberrancy. He was given 6mg and then 12 mg IV adenosine and then IV diltiazem bolus and gtt without effect. He then underwent external defibrillation with restoration of sinus. He remained hypotensive post. He was subsequently admitted. Review of ECG demonstrated his WCT was VT and not SVT. Diltiazem was stopped. He was placed on amiodarone bolus and gtt. He underwent a secondary prevention single chamber ICD on 7/3/18. A repeat echo showed LVEF 15% mid to distal anteroseptum, mid to distal anterior wall, mid to distal anterolateral wall, apical inferior wall and  apical septum are severely hypokinetic to akinetic and new LV thrombus. He developed cardiogenic shock on 7/11/18 and required swan ana and IABP placement. RHC showed severely elevated right and left sided filling pressures, severely reduced CO, and moderate secondary pulmonary HTN. He was started on milrinone gtt and lasix gtt. He had been transitioned to oral amiodarone. On 7/16/18, he developed VT with subsequent 4 ICD shocks. He was rebolused with IV amiodarone and placed back on amiodarone gtt. He was subsequently transferred to [facility] for higher level of care. Upon arrival he was hemodynamically stable on milrinone 0.25, amiodarone 1, and IABP. Given ANDREW on top of CKD with SCr 2.71 GFR 24 his milrinone gtt was stopped on admission here. He was alternatively placed on dobutamine and nipride gtt. He reports feeling better. Device interrogation here shows normal ICD function.  2 VT episodes recorded without therapy since last device interrogation on 7/16/18 - 172-175 bpm, 18 seconds.  2 VT episodes recorded with therapy on 7/16/18 - 178-183 bpm, 55 sec - 1 min 2 sec, ATP x 1-2 bursts and 1 41 J ICD shock delivered with each episode.  No arrhythmias recorded since 7/16/18.  Intrinsic rhythm = VS @ 75 bpm.   = <1%. Here hemodynamics showed CVP 6, PAP 42/20, PCWP 14, CO/CI 3.8/2.0, SVR 1683 on IABP and dobutamine. Current cardiac medications include: Plavix, Amiodarone gtt, dobutamine gtt, heparin gtt, and nipride gtt.        Past Medical History:   DM2  CKD  STEMI s/p PCI 6/27/18  Past Surgical History:   S/p PCI 6/27/18   Allergies: Per MAR   No Known Allergies  Medications:   Per MAR current outpatient cardiovascular medications include: Prescriptions Prior to Admission   Medication Sig Dispense Refill Last Dose     amiodarone (PACERONE/CODARONE) 200 MG tablet Take 400 mg by mouth 2 times daily   7/16/2018 at 0800     aspirin 81 MG tablet Take 81 mg by mouth daily   7/2/2018 at AM     atorvastatin (LIPITOR) 80  MG tablet Take 80 mg by mouth daily   7/1/2018 at PM     cefdinir (OMNICEF) 300 MG capsule Take 300 mg by mouth every 12 hours   7/16/2018 at AM     clopidogrel (PLAVIX) 75 MG tablet Take 75 mg by mouth daily   7/2/2018 at AM     doxycycline monohydrate 100 MG capsule Take 100 mg by mouth 2 times daily   7/16/2018 at AM     famotidine (PEPCID) 20 MG tablet Take 20 mg by mouth 2 times daily   7/2/2018 at AM     insulin aspart (NOVOLOG PEN) 100 UNIT/ML injection Less than 120 mg/dL = 0 Units  120-149 mg/dL = 1 Unit  150-199 mg/dL = 2 Units  200-249 mg/dL = 3 Units  250-299 mg/dL = 5 Units  300-349 mg/dL = 7 Units  350 mg/dL or greater = 8 Units   7/16/2018 at 1330     insulin glargine (LANTUS SOLOSTAR) 100 UNIT/ML pen Inject 50 Units Subcutaneous At Bedtime   7/16/2018 at 0800     lisinopril (PRINIVIL/ZESTRIL) 2.5 MG tablet Take 2.5 mg by mouth daily   7/2/2018 at AM     metFORMIN (GLUCOPHAGE) 500 MG tablet Take 500 mg by mouth 2 times daily (with meals)   7/2/2018 at AM     metoprolol succinate (TOPROL-XL) 25 MG 24 hr tablet Take 25 mg by mouth daily   7/2/2018 at AM     nitroGLYcerin (NITROSTAT) 0.4 MG sublingual tablet Place 0.4 mg under the tongue every 5 minutes as needed for chest pain For chest pain place 1 tablet under the tongue every 5 minutes for 3 doses. If symptoms persist 5 minutes after 1st dose call 911.   Not taken yet at AM     warfarin (COUMADIN) 2 MG tablet Take 4 mg by mouth daily On 7/1 and 7/2, then recheck INR at [facility] and take as directed   7/2/2018 at AM     No current outpatient prescriptions on file.     Current Facility-Administered Medications   Medication Dose Route Frequency     cefdinir  300 mg Oral Q12H CHANCE     clopidogrel  75 mg Oral Daily     doxycycline  100 mg Oral Q12H CHANCE     famotidine  20 mg Oral BID     insulin aspart  1-6 Units Subcutaneous Q4H     insulin glargine  20 Units Subcutaneous At Bedtime     sodium chloride (PF)  3 mL Intracatheter Q8H  "    Family History:   No family history on file.  Social History:   Social History   Substance Use Topics     Smoking status: Not on file     Smokeless tobacco: Not on file     Alcohol use Not on file       ROS:   A comprehensive 10 point ROS was negative other than as mentioned in HPI.    Physical Examination:   VITALS: BP 91/64  Temp 98.8  F (37.1  C) (Oral)  Resp 16  Ht 1.626 m (5' 4\")  Wt 78.4 kg (172 lb 13.5 oz)  SpO2 97%  BMI 29.67 kg/m2  GENERAL APPEARANCE: AxO, NAD   HEENT: NCAT, EOMI, MMM. PERRLA.   NECK: Supple.   CHEST: CTAB   CARDIOVASCULAR: IABP sounds, regular,   ABDOMEN: BS+, soft,NT, ND.   EXTREMITIES: Trace pedal edema. Distal pulses intact.   NEURO: Grossly nonfocal.   PSYCH: Normal affect.  SKIN: Warm and dry.   Data:   Labs:  BMP  Recent Labs  Lab 18  1215 18  0826 18  0410 18   NA  --  135 132* 133   POTASSIUM 4.0 3.9 3.9 4.0   CHLORIDE  --  86* 85* 87*   ARLENE  --  9.3 8.9 8.9   CO2  --  37* 43* 35*   BUN  --  80* 80* 86*   CR  --  2.71* 2.72* 2.79*   GLC  --  242* 249* 314*     CBC  Recent Labs  Lab 18  0826 18  0410 18   WBC 9.4 10.0 9.6   RBC 4.07* 4.08* 3.94*   HGB 11.8* 11.7* 11.5*   HCT 36.9* 37.3* 35.5*   MCV 91 91 90   MCH 29.0 28.7 29.2   MCHC 32.0 31.4* 32.4   RDW 13.6 13.7 13.5    233 245     INR  Recent Labs  Lab 18   INR 1.22*     EK/5/18 ECHO ([facility] REPORT):   Interpretation Summary  A limited echo was performed after ICD implantation.  The left ventricular systolic function is severely reduced ejection fraction is estimated to be 15%. The mid to distal anteroseptum, mid to distal anterior  wall, mid to distal anterolateral wall, apical inferior wall and apical septum are severely hypokinetic to akinetic. Mild to moderate left ventricular  hypertrophy.  A left ventricular apical thombus is seen.  The right ventricle appears normal in size and function. A device wire is seen in the right " ventricle.  The inferior vena cava is dilated and does not collapse with inspiration.  There is no pericardial effuison.  The ejection fraction is a littel worse than in the study dated 6/28/2018, when it was reported to be 25%.  Doppler studies were not performed.    6/28/18 ECHO ([facility] REPORT):  Interpretation Summary  There is a large sized apical, septal, anteroseptal, anterior, inferior, posterior, and lateral wall motion abnormality with hypokinesis to akinesis of the  segments.  Normal left ventricular chamber size, moderate concnetric hypertrophy and severely reduced systolic function. LVEF 25%.  Moderate grade II diastolic dysfunction with elevated filling pressures.  Normal right ventricular chamber size and reduced systolic function. Normal pulmonary and right atrial pressures.  Valves: mild mitral regurgitation, mild tricuspid regurgitation  No pericardial effusion.  Normal ascending aorta.    7/11/18 RHC:  A 57 year old male with cardiogenic shock, acute heart failure and   ischemic cardiomyopathy    1. In the resting, lightly sedated state: severely elevated right and   left sided filling pressures, severely reduced cardiac output, Moderate   pulmonary hypetension secondary to left heart disease    2. 40 CC IABP palced from right CFA approach    3. Right IJ 7F sheath and right CFA 8F sheath (both accessess obtained   under direct ultrasound guidance and images saved in PACS) secured in   place for later removal.    6/27/18 LHC:   A 57 year old male presenting wit 2 days of chest pain and dyspnea and   ST elevation on EKG    1. Ostial LAD thrombotic occlusion    2. Status post primary PCI (IVUS-guided, transradial) in the ostial to   mid LAD with angioplasty and stenting using overlapping 3.5 X 12 and 3.0 X   30 mm Resolute Abdifatah ALYSSA post dialted to 4.0 mm.    3. Elevated LV filling pressure (LVEDP=25 mmHg)    4. Right radial 6F sheath removed and TR band applied for hemostasis  Assessment:   Mr. Sharp is  a 57 year old male who has a past medical history significant for DM2, CKD, and recent STEMI 6/27/18 s/p PCI ALYSSA ostial-mid LAD.     On 6/23/18, he developed chest pain that gradually worsened prompting him to go in to local ER for evaluation on 6/27/18. He was found to have STEMI and was taken emergently to cath lab. Coronary angiogram showed LAD lesion s/p angioplasty and stenting using overlapping 3.5 X 12 and 3.0 X 30 mm Resolute [place] ALYSSA post dialted to 4.0 mm to ostial to mid LAD. An echo showed a large sized apical, septal, anteroseptal, anterior, inferior, posterior, and lateral wall motion abnormality with hypokinesis to akinesis of the segments, LVEF 25%, moderate grade II diastolic dysfunction with elevated filling pressures, normal RV size and function, and mild MR and TR. He was discharged on ASA, Plavix, Warfarin, Toprol XL, and a statin. He then represented to [facility] ER on 7/2/18 reporting hematuria. He was found to be tachycardic 170-180 bpm and hypotensive. He reported having some RADER and felt a little clammy, but otherwise asymptomatic. He was found to be in WCT at 179 bpm initially thought to be SVT with aberrancy. He was given 6mg and then 12 mg IV adenosine and then IV diltiazem bolus and gtt without effect. He then underwent external defibrillation with restoration of sinus. He remained hypotensive post. He was subsequently admitted. Review of ECG demonstrated his WCT was VT and not SVT. Diltiazem was stopped. He was placed on amiodarone bolus and gtt. He underwent a secondary prevention single chamber ICD on 7/3/18. A repeat echo showed LVEF 15% mid to distal anteroseptum, mid to distal anterior wall, mid to distal anterolateral wall, apical inferior wall and apical septum are severely hypokinetic to akinetic and new LV thrombus. He developed cardiogenic shock on 7/11/18 and required swan ana and IABP placement. RHC showed severely elevated right and left sided filling pressures,  severely reduced CO, and moderate secondary pulmonary HTN. He was started on milrinone gtt and lasix gtt. He had been transitioned to oral amiodarone. On 7/16/18, he developed VT with subsequent 4 ICD shocks. He was rebolused with IV amiodarone and placed back on amiodarone gtt. He was subsequently transferred to [facility] for higher level of care. Upon arrival he was hemodynamically stable on milrinone 0.25, amiodarone 1, and IABP. Given ANDREW on top of CKD with SCr 2.71 GFR 24 his milrinone gtt was stopped on admission here. He was alternatively placed on dobutamine and nipride gtt. He reports feeling better. Device interrogation here shows normal ICD function.  2 VT episodes recorded without therapy since last device interrogation on 7/16/18 - 172-175 bpm, 18 seconds.  2 VT episodes recorded with therapy on 7/16/18 - 178-183 bpm, 55 sec - 1 min 2 sec, ATP x 1-2 bursts and 1 41 J ICD shock delivered with each episode.  No arrhythmias recorded since 7/16/18.  Intrinsic rhythm = VS @ 75 bpm.   = <1%. Here hemodynamics showed CVP 6, PAP 42/20, PCWP 14, CO/CI 3.8/2.0, SVR 1683 on IABP and dobutamine. Current cardiac medications include: Plavix, Amiodarone gtt, dobutamine gtt, heparin gtt, and nipride gtt.        EP Recommendations:  Ventricular Tachycardia in ICM LVEF 15% with recent STEMI now with cardiogenic shock:  - Review of 12 lead ECG and device EGMs show monomorphic VT. 12 lead shows a likely inferior, apical, lateral VT.   - Agree with amiodarone  - BB deferred due to cardiogenic shock.   - Keep K >4, Mag >2  - Will need to allow for HF compensation and STEMI to mature. VT likely also worsened by milrinone.  - If recurrent VT can consider lidocaine bolus; however, would avoid placing on lidocaine gtt. If refractory VT, would need to intubate/sedate.   - Would be higher risk VT ablation right now given new LV thrombus. Would only consider currently for refractory VT storm. Can consider VT ablation in the future if  recurrent VT after LV thrombus resolves/matures and after further HF compensation.      The patient states understanding and is agreeable with plan.   Thank you for allowing us to participate in the care of this patient.     The patient was discussed w/ Dr. Khoury.  The above note reflects our joint plan.    CARMEN Morin CNP  Electrophysiology Consult Service  Pager: [phone]    EP STAFF NOTE  I have seen and examined the patient as part of a shared visit with FILIBERTO Morin NP.  I agree with the note above. I reviewed today's vital signs and medications. I have reviewed and discussed with the advanced practice provider their physical examination, assessment, and plan   Briefly, no recurrence of VT after milrinone was discontinued, recovering from cardiogenic shock, has ICD, on amio, LV thrombus  My key history/exam findings are: RRR.   The key management decisions made by me: continue amiodarone loading, defer ablation for now due ot fresh LV thrombus and recent start of amio.    Simón Khoury MD [facility]  Cardiology - Electrophysiology

## 2018-07-17 NOTE — PLAN OF CARE
Problem: Patient Care Overview  Goal: Plan of Care/Patient Progress Review  Outcome: No Change  PT admitted from OSH to Car2 service for advanced HF therapies and med management. Pt A&O x4. BASS to command with good/equal strength. SHERINE. Afebrile. Underlying sinus rhythm with IABP. On amiodarone gtt. Lasix and Mirinone Gtt's dc'd.   0400 CVP 6 CI: 1.8. Dobutamine gtt started to improve CI. See flowsheets for additional cardiac numbers. Pt on 5L/NC. BBS clear/diminished. Pt has apneic episodes with desaturation into the mid 80's while sleeping. Productive cough. UOP excellent per duarte. BM x1.   Continue plan of care and update team with any changes.    Problem: Cardiac Disease Comorbidity  Goal: Cardiac Disease  Patient comorbidity will be monitored for signs and symptoms of Cardiac Disease.  Problems will be absent, minimized or managed by discharge/transition of care.   Outcome: No Change  Pt on IABP with dobutamine and amiodarone gtt's. Continue to monitor.

## 2018-07-17 NOTE — PROGRESS NOTES
CARDIOLOGY PROGRESS NOTE    SUBJECTIVE:  No acute events overnight. Mr. Sharp reports fatigue this morning but otherwise has no active concerns. Milrinone was stopped overnight due to ANDREW. CI dropped to 1.8 and Dobutamine 2.5 mcg/kg/min was started with some improvement this AM. No arrythmias noted. Furosemide gtt stopped overnight when CVP dropped to 6 and PCWP to 14.    ROS otherwise negative.    OBJECTIVE:  Vital signs:  BP range 82//77  HR 69-73  RR 14-16  SpO2 94-97% on 5L NC  Tm 98.8F    Vitals:    07/16/18 2000 07/17/18 0400   Weight: 79 kg (174 lb 2.6 oz) 78.4 kg (172 lb 13.5 oz)       I/O:   Intake: +43 (+619)  Output: -1070 (-2325)  NET: -1026 (-1705)    Hemodynamics:  CVP 6  PAP 42/20  PCWP 14  CO/CI 3.8/2.0  SVR 1683    PHYSICAL EXAM:  General: AAO x 3,  no clubbing/cyanosis, no edema, JVP at mid neck   HEENT:  PERRL, MMM with out pharyngeal erythema.   Cardiac: RRR. No m/r/g. Normal S1, S2. DP2+ bilaterally  Pulm: CTAB, no wheezes, no crackles.  Abd: +BS, soft, non distended, non tender. No hepatosplenomegaly.  Skin: No rash  MSK: No deformities, no joint swelling, warm and well perfused    Neuro: CN grossly intact, no focal deficits  Psych: Appropriate mood, full affect    Recent Labs   Lab Test  07/17/18   1215  07/17/18   0826  07/17/18   0410   HGB   --   11.8*  11.7*   HCT   --   36.9*  37.3*   WBC   --   9.4  10.0   MCV   --   91  91   MCH   --   29.0  28.7   MCHC   --   32.0  31.4*   RDW   --   13.6  13.7   PLT   --   222  233   NA   --   135  132*   POTASSIUM  4.0  3.9  3.9   CHLORIDE   --   86*  85*   CO2   --   37*  43*   BUN   --   80*  80*   CR   --   2.71*  2.72*   GLC   --   242*  249*   ARLENE   --   9.3  8.9   ALBUMIN   --    --   2.6*   BILITOTAL   --    --   1.0   ALKPHOS   --    --   171*   AST   --    --   80*   ALT   --    --   142*     Imaging:  CXR, 7/17/2018:  1.The superior metallic marker of IABP projects at the level of  kris.   2. Right IJ [place] catheter tip projects  over the distal right main  pulmonary artery.  3. Low lung volumes.  4. Bibasilar and retrocardiac opacities. Differential includes  atelectasis and/or consolidation.  5. Cholelithiasis.    Medications:  Cefdinir 300 mg q12 hrs  Clopidogrel 75 mg daily  Doxycycline 100 mg q12 hrs  Famotidine 20 mg BID  Glargine 20 units daily     Infusions:  Amiodarone 0.5 mg/min  Dobutamine 2.5 mcg/kg/min  Heparin gtt    ASSESSMENT/PLAN:  Mr. Mariusz Sharp is a 57-year-old male with medical history significant for recent STEMI (6/27) s/p PCI with ALYSSA to ostial LAD, type 2 DM and CKD stage 3 who was readmitted to [facility] on 7/2 with VT s/p DCCV, the admission was complicated by cardiogenic shock with new LVEF 15% (see more detials below). He was transferred to [facility] on 7/16 for further management.             Neurology  No active issues, no sedation needed.     Respiratory  # Bronchitis    From care everywhere, patient developed leukocytosis and elevated pro-calcitonin, no infiltration on CXR. Ceftriaxone and doxycyclin was started on 7/14 for bronchitis. Now on cefdinir and doxycyline. No leukocytosis on admission. No respiratory symptoms.      - Continue cefdinir 300 mg bid and doxycycline 100 mg bid, plan for 5 days. Today is day 3/5.        Cardiovascular  # Cardiogenic shock   # HFrEF, ICM, LVEF 15%    ACC stage C, NYHA III, TTE (7/5) showed LVEF 15%, with multiple wall motion abnormalities, likely ischemic. Per care everywhere, patient developed cardiogenic shock on 7/11, although initial Lyons numbers not available. [place] and IABP were placed. He received milrinone gtt and diuresis with furosemide gtt, prn chlorothiazide and metolazone. At [facility], CVP 12, PCWP 24, CI 2.2 and lactate 1.1 (on milrinone gtt 0.25 en route). He was well perfused from exam. Patient switched from Milrinone to Dobutamine overnight with CI holding 1.8-2.0. Patient diuresed well with CVP falling to 5 and PCWP 14.     - Telemetry, strict I&O, weight  daily  - Diuresis: Hold diuresis today for PCWP and CVP at target.  - Inotrope: Dobutamine 2.5 mcg/kg/min  - ACEI/ARB: hold off for now due to ANDREW   - Beta blocker: hold off for now due to cardiogenic shock   - Reduce IABP from 1:1-->1:2-->1:3. Start Nipride. q6 hemodynamics. Will reassess tomorrow and potentially pull IABP.  - Will arrange for LVAD coordinator to see patient and do LVAD show-and-tell. Will defer further LVAD work-up at this time.     # Recent STEMI s/p PCI with ALYSSA to ostial LAD (6/27)   Troponin on admission 1.46, no chest pain    - Continue Plavix 75 mg daily (no aspirin due to heparin gtt)    - Atorvastatin was held for now due to elevated AST and ALT       # VT s/p ICD  Patient developed VT on 7/2 s/p ICD placement, S/p ICD shocked x 2 on 7/16 prior to transfer.  - Continue amiodarone gtt, now 0.5 mg/min   - EP consult today     # Atrial flutter   # LV thrombus    Patient developed atrial flutter on 7/2. TTE on 7/5 showed LV clot. [id number] (heart failure and diabetes). EKG on arrival showed NSR.           - Low dose intensity heparin gtt  - Hold off warfarin      Renal  # Non-oliguric ANDREW on CKD stage III   Baseline creatinine around 1-1.2 (7/2), creatinines from 7/5 to 7/16 were persistently > 3. Cr on admission at [facility] is 2.79.      - Continue to monitor   - Avoid nephrotoxic medications  - Maintain hemodynamic      - Hold diuretics for rest of today     Infectious disease  # Bronchitis   - Ceftriaxone 2 g IV (7/14)  - Cefdinir 300 mg bid (7/15-present)  - Doxycycline 100 mg bid (7/14-present)         Gastrointestinal  No active issues      # Nutrition  Consistent carb with 2 g sodium and 1.5 L fluid restriction      # Stress ulcer Prophylaxis: Famotidine 20 mg PO bid      Endocrine  # Type 2 DM   Last HGBA1C 15 (6/2018), taking glargine 50 units with aspart carb count    - Start Glargine at 20 units/day   - medium dose aspart SSI         Heme/Onc  # Normocytic anemia   Likely chronic  -  Continue to monitor       # DVT Prophylaxis: Heparin gtt     # Indwelling lines  Vascular access: PIV   Other lines: Shubuta at RIJ, IABP   Perez's catheter      # Code Status: Full code     Seen and staffed with Dr. Ortiz.    Tony Fragoso MD  Cardiology Fellow, PGY-5  Pager #[phone]

## 2018-07-17 NOTE — PHARMACY-ADMISSION MEDICATION HISTORY
Admission medication history interview status for the 7/16/2018 admission is complete. See Epic admission navigator for allergy information, pharmacy, prior to admission medications and immunization status.     Medication history interview sources:  Patient, Care Everywhere, Chart Review    Changes made to PTA medication list (reason)  Added: Amiodarone, aspirin, atorvastatin, cefdinir, clopidogrel, doxycyline, famotidine, insulin aspart, insulin glargine, lisinopril, metformin, metoprolol, milrinone, nitroglycerin, warfarin  Deleted: None  Changed: None    Additional medication history information (including reliability of information, actions taken by pharmacist):  -Per Care Everywhere, all medications below except cefdinir, doxycyline, both insulins, milrinone, and amiodarone were prescribed for pt when he was discharged from [facility] in [place] on 6/30  -Pt was discharged on 6/30 with lantus 20 units at bedtime but did not get it filled due to insurance issues per pt, requirements increased to 50 units at bedtime at [facility]  -Pt reported taking his medications on 6/30, 7/1, and the morning of 7/2 before readmission to [facility]  -Pt's discharge directions on 6/30 indicate that he was to take warfarin for 3-6 months on discharge   -Pt's discharge directions on 6/30 indicate that he was to take aspirin, warfarin, and clopidogrel triple therapy for one month, then stop taking aspirin until completing the 3-6 month course of warfarin, then resume taking the aspirin after that  -Pt is to be on clopidogrel until 6/29/19 DAPT  -Pt was discharged with warfarin 4mg daily for 7/1 and 7/2 and was supposed to get his INR rechecked at [facility] on 7/2 when he was readmitted  -At [facility], pt had been taking amiodarone 400mg by mouth twice daily from 7/9-7/16 at 0800, and was slated to start an amiodarone drip at 1800 yesterday 7/16 per [facility]'s MAR  -At [facility], pt was started on milrinone on 7/15  and the last infusion was administered there at 1300 on 7/16   -At [facility], pt was started on cefdinir and doxycycline for a 5 day course for bronchitis. Cefdinir was started in the morning 7/16, and doxycycline was started in the evening 7/15. On their discharge list, they indicated a stop date of 7/21 for both medications    Prior to Admission medications    Medication Sig Last Dose Taking? Auth Provider   amiodarone (PACERONE/CODARONE) 200 MG tablet Take 400 mg by mouth 2 times daily 7/16/2018 at 0800 Yes Unknown, Entered By History   aspirin 81 MG tablet Take 81 mg by mouth daily 7/2/2018 at AM Yes Unknown, Entered By History   atorvastatin (LIPITOR) 80 MG tablet Take 80 mg by mouth daily 7/1/2018 at PM Yes Unknown, Entered By History   cefdinir (OMNICEF) 300 MG capsule Take 300 mg by mouth every 12 hours 7/16/2018 at AM Yes Unknown, Entered By History   clopidogrel (PLAVIX) 75 MG tablet Take 75 mg by mouth daily 7/2/2018 at AM Yes Unknown, Entered By History   doxycycline monohydrate 100 MG capsule Take 100 mg by mouth 2 times daily 7/16/2018 at AM Yes Unknown, Entered By History   famotidine (PEPCID) 20 MG tablet Take 20 mg by mouth 2 times daily 7/2/2018 at AM Yes Unknown, Entered By History   insulin aspart (NOVOLOG PEN) 100 UNIT/ML injection Less than 120 mg/dL = 0 Units  120-149 mg/dL = 1 Unit  150-199 mg/dL = 2 Units  200-249 mg/dL = 3 Units  250-299 mg/dL = 5 Units  300-349 mg/dL = 7 Units  350 mg/dL or greater = 8 Units 7/16/2018 at 1330 Yes Unknown, Entered By History   insulin glargine (LANTUS SOLOSTAR) 100 UNIT/ML pen Inject 50 Units Subcutaneous At Bedtime 7/16/2018 at 0800 Yes Unknown, Entered By History   lisinopril (PRINIVIL/ZESTRIL) 2.5 MG tablet Take 2.5 mg by mouth daily 7/2/2018 at AM Yes Unknown, Entered By History   metFORMIN (GLUCOPHAGE) 500 MG tablet Take 500 mg by mouth 2 times daily (with meals) 7/2/2018 at AM Yes Unknown, Entered By History   metoprolol succinate (TOPROL-XL) 25 MG 24  hr tablet Take 25 mg by mouth daily 7/2/2018 at AM Yes Unknown, Entered By History   nitroGLYcerin (NITROSTAT) 0.4 MG sublingual tablet Place 0.4 mg under the tongue every 5 minutes as needed for chest pain For chest pain place 1 tablet under the tongue every 5 minutes for 3 doses. If symptoms persist 5 minutes after 1st dose call 911. Not taken yet at AM Yes Unknown, Entered By History   warfarin (COUMADIN) 2 MG tablet Take 4 mg by mouth daily On 7/1 and 7/2, then recheck INR at [facility] and take as directed 7/2/2018 at AM Yes Unknown, Entered By History         Medication history completed by:   Jaden Rangel  Pharmacy Student  [employer]

## 2018-07-17 NOTE — MR AVS SNAPSHOT
After Visit Summary   7/17/2018    Mariusz Sharp    MRN: [id number]           Patient Information     Date Of Birth          1960        Visit Information        Provider Department      7/17/2018 6:00 AM 1, [facility]        Today's Diagnoses     Heart failure (H)    -  1    ICD (implantable cardioverter-defibrillator), biventricular - [employer] biventricular ICD - DEPENDENT - RV, LV leads only           Follow-ups after your visit        Future tests that were ordered for you today     Open Standing Orders        Priority Remaining Interval Expires Ordered    Heparin Xa (10a) Level STAT 1/1 CONDITIONAL X 1 STAT  7/16/2018    CBC with platelets Routine 5/5 EVERY THREE DAYS  7/16/2018    Heparin Xa (10a) Level Routine 15/16 DAILY  7/16/2018    Heparin Xa (10a) Level Routine 100/100 CONDITIONAL (SPECIFY)  7/16/2018    Blood gas venous with oxyhemoglobin (PCU Collect TBD) Routine 93/100 CONDITIONAL (SPECIFY)  7/16/2018    Blood gas arterial with oxyhemoglobin (1200) Routine 100/100 CONDITIONAL (SPECIFY)  7/16/2018    Glucose monitor nursing POCT Routine [id number] PRN  7/16/2018    Glucose monitor nursing POCT Routine [id number] PRN  7/16/2018    EKG 12-lead, tracing only STAT 100/100 CONDITIONAL (SPECIFY)  7/16/2018    Oxygen: Nasal cannula Routine [id number] PRN  7/16/2018    EKG 12-lead, tracing only Routine 1/1 CONDITIONAL X 1  7/16/2018            Who to contact     If you have questions or need follow up information about today's clinic visit or your schedule please contact [facility] directly at [phone].  Normal or non-critical lab and imaging results will be communicated to you by MyChart, letter or phone within 4 business days after the clinic has received the results. If you do not hear from us within 7 days, please contact the clinic through MyChart or phone. If you have a critical or abnormal lab result, we will notify you by phone as soon  "as possible.  Submit refill requests through [employer] or call your pharmacy and they will forward the refill request to us. Please allow 3 business days for your refill to be completed.          Additional Information About Your Visit        C3DNAhart Information     [employer] lets you send messages to your doctor, view your test results, renew your prescriptions, schedule appointments and more. To sign up, go to [email or web address] . Click on \"Log in\" on the left side of the screen, which will take you to the Welcome page. Then click on \"Sign up Now\" on the right side of the page.     You will be asked to enter the access code listed below, as well as some personal information. Please follow the directions to create your username and password.     Your access code is: [email or web address]  Expires: 10/15/2018  5:33 PM     Your access code will  in 90 days. If you need help or a new code, please call your [place] clinic or [phone].        Care EveryWhere ID     This is your Care EveryWhere ID. This could be used by other organizations to access your [place] medical records  [id number]         Blood Pressure from Last 3 Encounters:   18 91/64    Weight from Last 3 Encounters:   18 78.4 kg (172 lb 13.5 oz)              We Performed the Following     Glucose by meter     ICD DEVICE PROGRAMMING EVAL, SINGLE LEAD ICD          Today's Medication Changes      Notice     This visit is during an admission. Changes to the med list made in this visit will be reflected in the After Visit Summary of the admission.             Primary Care Provider Office Phone # Fax #    [facility] [phone]       [address] [place] [address]        Equal Access to Services     GILLIAN COX : Olena French, hiram vega, mynor espino. So [facility] [phone].    ATENCIÓN: Si habla español, tiene a wadedll disposición " servicios gratuitos de asistencia lingüística. Trent davis [phone].    We comply with applicable federal civil rights laws and [place] laws. We do not discriminate on the basis of race, color, national origin, age, disability, sex, sexual orientation, or gender identity.            Thank you!     Thank you for choosing [facility]  for your care. Our goal is always to provide you with excellent care. Hearing back from our patients is one way we can continue to improve our services. Please take a few minutes to complete the written survey that you may receive in the mail after your visit with us. Thank you!             Your Updated Medication List - Protect others around you: Learn how to safely use, store and throw away your medicines at www.disposemymeds.org.      Notice     This visit is during an admission. Changes to the med list made in this visit will be reflected in the After Visit Summary of the admission.

## 2018-07-17 NOTE — H&P
Cardiology History and Physical    Mariusz Sharp MRN# [id number]   Age: 57 year old YOB: 1960     Date of Admission: 7/16/2018  Primary care provider: No primary care provider on file.          Assessment and Plans     Mr. Mariusz Sharp is a 57-year-old male with medical history significant for recent STEMI (6/27) s/p PCI with ALYSSA to ostial LAD, type 2 DM and CKD stage 3 who was readmitted to [facility] on 7/2 with VT s/p DCCV, the admission was complicated by cardiogenic shock with new LVEF 15% (see more detials below). He was transferred to [facility] on 7/16 for further management.          __________________________________________________________________________    Neurology    No active issues     # Sedation   None   __________________________________________________________________________    Respiratory    # Bronchitis    From care everywhere, patient developed leukocytosis and elevated pro-calcitonin, no infiltration on CXR. Ceftriaxone and doxycyclin was started on 7/14 for bronchitis. Now on cefdinir and doxycyline. No leukocytosis on admission. No respiratory symptoms.      - Continue cefdinir 300 mg bid and doxycycline 100 mg bid, plan for 5 days            #  Oxygenation/ventilation   On NC 5 LPM   - VBG and ABG prn   __________________________________________________________________________    Cardiovascular    # Cardiogenic shock   # HFrEF, ICM, LVEF 15%    ACC stage C, NYHA III, TTE (7/5) showed LVEF 15%, the mid to distal anteroseptum, mid to distal anterior wall, mid to distal anterolateral wall, apical inferior wall and apical septum are severely hypokinetic to akinetic. Likely from ischemia. Per care everywhere, patient developed cardiogenic shock on 7/11. [place] and IABP were placed. He received milrinone gtt and diuresis with furosemide gtt, prn chlorothiazide and metolazone. At [facility], CVP 12, PCWP 24, CI 2.2 and lactate 1.1 (on milrinone gtt 0.25 en route). He is well perfused  from exam. Weight on admission 79 kg (174 lb), unknown dry weight, BNP 5,062 (503 on 6/27).             - Telemetry, strict I&O, weight daily  - Diuresis: furosemide gtt, now 20 mg/hours, goal net negative 1-2 L  - Inotrope: DC milrinone due to Cr, will continue to monitor hemodynamic q 4 hours  - ACEI/ARB: hold off for now due to ANDREW   - Beta blocker: hold off for now due to cardiogenic shock   - Continue IABP          # Recent STEMI s/p PCI with ALYSSA to ostial LAD (6/27)   Troponin on admission 1.46, no chest pain    - Continue Plavix 75 mg daily (no aspirin due to heparin gtt)    - Atorvastatin was held for now due to elevated AST and ALT      # VT s/p ICD  Patient developed VT on 7/2 s/p ICD placement, S/p ICD shocked x 2 on 7/16 prior to transfer.  - Continue amiodarone gtt, now 0.5 mg/min   - Hold off PO amiodarone         # Atrial flutter   # LV thrombus    Patient developed atrial flutter on 7/2. TTE on 7/5 showed LV clot. [id number] 2 (heart failure and diabetes). EKG on arrival showed NSR.           - Low dose intensity heparin gtt  - Hold off warfarin   _______________________________________________________________________    Renal    # Non-oliguric ANDREW on CKD stage III   Baseline creatinine around 1-1.2 (7/2), creatinines from 7/5 to 7/16 were persistently > 3. Cr on admission at [facility] is 2.79.      - Continue to monitor   - Avoid nephrotoxic medications  - Maintain hemodynamic        # Volume status: Hypervolemia     # Electrolyte/ Acid-base  Replace electrolyte prn, K > 4, Mg > 2   __________________________________________________________________________    Infectious disease    # Bronchitis   Discussed as above      # Antibiotics   - Ceftriaxone 2 g IV (7/14)  - Cefdinir 300 mg bid (7/15-present)  - Doxycycline 100 mg bid (7/14-present)      __________________________________________________________________________    Gastrointestinal    No active issues     # Nutrition  Consistent carb with 2 g  "sodium and 1.5 L fluid restriction     # Stress ulcer Prophylaxis: Famotidine 20 mg PO bid   __________________________________________________________________________    Endocrine    # Type 2 DM   Last HGBA1C 15 (6/2018), taking glargine 50 units with aspart carb count    - Hold glargine for now, need to restart in AM   - Hypoglycemia protocol   - BS check q 4 hours   - medium dose aspart SSI       __________________________________________________________________________    Heme/Onc    # Normocytic anemia   Likely chronic  - Continue to monitor      # DVT Prophylaxis: Heparin gtt   __________________________________________________________________________    Rheum/MSK    No active issues   __________________________________________________________________________    Misc    # Psychosocial and family: Will be updated in AM     # Indwelling lines  Vascular access: PIV   Other lines: [facility] at [facility], MARTA Mcleodey's catheter     # Disposition: Critically ill, Cards 2     # Code Status: Full code     Patient will be formally staffed in AM.     Supavit \"Mac\" MD Franc  PGY-2, Internal Medicine  Pager [phone]      I have reviewed today's vital signs, notes, medications, labs and imaging. I have also seen and examined the patient and agree with the findings and plan as outlined above.  Please see critical care note dated 7/17/18 for updated findings and plan.    Jenni Ortiz MD  Section Head - Advanced Heart Failure, Transplantation and Mechanical Circulatory Support  Co-Director - Adult Congenital and Cardiovascular Genetics Center  Associate Professor of Medicine, [facility]               Chief Complaint:     Cardiogenic shock          History of Present Illness     Mr. Mariusz Sharp is a 57-year-old male with medical history significant for type 2 DM and CKD stage 3 who was initially admitted to [facility] for STEMI on 6/27 s/p PCI with ALYSSA to ostial LAD. He was discharged and was " "re-admitted on 7/2 due to hematuria and was found to have episode of VT s/p DCCV and ICD placement. TTE on 7/5 showed LVEF 15%, the mid to distal anteroseptum, mid to distal anterior wall, mid to distal anterolateral wall, apical inferior wall and apical septum are severely hypokinetic to akinetic which was new dropping of LVEF. He also found to have new LV thrombus. He subsequently developed cardiogenic shock on 7/11. [place] and IABP were placed. He received milrinone gtt and diuresis with furosemide gtt, prn chlorothiazide and metolazone. On 7/16, he developed VT s/p ICD shock x 3. He was transferred to [facility] for higher level of care. Upon arrival, he was hemodynamically stable on milrinone 0.25 and IABP. He states that he feels better. He denies chest pain, shortness of breath, palpitation or other concerns.               Remainder of 12pt-ROS negative except mentioned above        Other History     Past Medical History    Patient Active Problem List   Diagnosis     Heart failure (H)     Past Surgical History    No past surgical history on file.    Medication    No current facility-administered medications on file prior to encounter.   No current outpatient prescriptions on file prior to encounter.    Allergies  Allergies not on file    Family History  No family history on file.    Social History  Social History     Social History     Marital status: N/A     Spouse name: N/A     Number of children: N/A     Years of education: N/A     Occupational History     Not on file.     Social History Main Topics     Smoking status: Not on file     Smokeless tobacco: Not on file     Alcohol use Not on file     Drug use: Not on file     Sexual activity: Not on file     Other Topics Concern     Not on file     Social History Narrative          Vitals and Exam     BP 92/66  Temp 97.7  F (36.5  C)  Ht 1.626 m (5' 4\")  Wt 79 kg (174 lb 2.6 oz)  SpO2 96%  BMI 29.9 kg/m2  Wt Readings from Last 2 Encounters:   07/16/18 79 kg (174 " lb 2.6 oz)     No intake or output data in the 24 hours ending 07/16/18 2040    General: AAO x 3,  no clubbing/cyanosis, no edema, JVP at mid neck   HEENT:  PERRL, MMM with out pharyngeal erythema.   Cardiac: RRR. No m/r/g. Normal S1, S2. DP2+ bilaterally  Pulm: CTAB, no wheezes, no crackles.  Abd: +BS, soft, non distended, non tender. No hepatosplenomegaly.  Skin: No rash  MSK: No deformities, no joint swelling, warm and well perfused    Neuro: CN grossly intact, no focal deficits  Psych: Appropriate mood, full affect         Labs     CBC  Recent Labs  Lab 07/16/18 2001   WBC 9.6   RBC 3.94*   HGB 11.5*   HCT 35.5*   MCV 90   MCH 29.2   MCHC 32.4   RDW 13.5          BMP    Recent Labs  Lab 07/16/18 2001      POTASSIUM 4.0   CHLORIDE 87*   CO2 35*   ANIONGAP 11   *   BUN 86*   CR 2.79*   GFRESTIMATED 24*   GFRESTBLACK 29*   ARLENE 8.9   MAG 2.7*        INR  Recent Labs  Lab 07/16/18 2001   INR 1.22*       Liver panel    Recent Labs  Lab 07/16/18 2001   PROTTOTAL 7.2   ALBUMIN 2.5*   BILITOTAL 0.9   ALKPHOS 170*   *   *

## 2018-07-17 NOTE — PROGRESS NOTES
Preliminary Device Interrogation Results.  Final physician signed paceart report to be scanned and attached.    Patient seen on 4E for evaluation and iterative programming of his [employer] single lead ICD per MD orders.  Normal ICD function.  2 VT episodes recorded without therapy since last device interrogation on 7/16/18 - 172-175 bpm, 18 seconds.  2 VT episodes recorded with therapy since last interrogation on 7/16/18 - 178-183 bpm, 55 sec - 1 min 2 sec, ATP x 1-2 bursts and 1 41 J ICD shock delivered with each episode.  No arrhythmias recorded since 7/16/18.  Intrinsic rhythm = VS @ 75 bpm.   = <1%.  Estimated battery longevity to QUIQUE = 12 years.  Patient is s/p ICD implant on 7/3/18 at [facility] [place] in [place].  Mariann Delgadillo NP notified of interrogation results and EGM's provided for review.    Single lead ICD

## 2018-07-17 NOTE — PLAN OF CARE
Problem: Cardiac: Heart Failure (Adult)  Goal: Signs and Symptoms of Listed Potential Problems Will be Absent, Minimized or Managed (Cardiac: Heart Failure)  Signs and symptoms of listed potential problems will be absent, minimized or managed by discharge/transition of care (reference Cardiac: Heart Failure (Adult) CPG).   Outcome: No Change  D: Recent VT/ STEMI/ stents  I/A: A/O x4. Weaned IABP from 1:1, then 1:2 x4 hours, tolerated well with MAPs >70, augmenting pressures in the 90s. Weaned to 1:3 at 1600, tolerating well. Added Nipride this afternoon, titrating for MAP goal of <75. 4L NC to keep O2 sats >93%. Pacer RN and EP saw pt at bedside, see note for new parameters. HR SR 70s-90s, no ectopy. 1 BM this shift. UO  mL/hr. See flowsheet for AMRIT numbers. Added Lantus tonight for elevated BG.   P: Continue POC. Anticipate continued VAD workup and weaning of cardiac medications/ IABP as tolerated

## 2018-07-18 ENCOUNTER — APPOINTMENT (OUTPATIENT)
Dept: GENERAL RADIOLOGY | Facility: CLINIC | Age: 58
DRG: 270 | End: 2018-07-18
Attending: INTERNAL MEDICINE
Payer: COMMERCIAL

## 2018-07-18 LAB
ABO + RH BLD: NORMAL
ABO + RH BLD: NORMAL
ANION GAP SERPL CALCULATED.3IONS-SCNC: 6 MMOL/L (ref 3–14)
BASE EXCESS BLDV CALC-SCNC: 10.8 MMOL/L
BASE EXCESS BLDV CALC-SCNC: 11 MMOL/L
BASE EXCESS BLDV CALC-SCNC: 11.1 MMOL/L
BASE EXCESS BLDV CALC-SCNC: 11.5 MMOL/L
BASE EXCESS BLDV CALC-SCNC: 14.1 MMOL/L
BASE EXCESS BLDV CALC-SCNC: 15.5 MMOL/L
BASE EXCESS BLDV CALC-SCNC: 9.8 MMOL/L
BLD GP AB SCN SERPL QL: NORMAL
BLOOD BANK CMNT PATIENT-IMP: NORMAL
BUN SERPL-MCNC: 86 MG/DL (ref 7–30)
CALCIUM SERPL-MCNC: 8.6 MG/DL (ref 8.5–10.1)
CHLORIDE SERPL-SCNC: 90 MMOL/L (ref 94–109)
CO2 SERPL-SCNC: 38 MMOL/L (ref 20–32)
CREAT SERPL-MCNC: 2.47 MG/DL (ref 0.66–1.25)
ERYTHROCYTE [DISTWIDTH] IN BLOOD BY AUTOMATED COUNT: 13.6 % (ref 10–15)
GFR SERPL CREATININE-BSD FRML MDRD: 27 ML/MIN/1.7M2
GLUCOSE BLDC GLUCOMTR-MCNC: 227 MG/DL (ref 70–99)
GLUCOSE BLDC GLUCOMTR-MCNC: 253 MG/DL (ref 70–99)
GLUCOSE BLDC GLUCOMTR-MCNC: 267 MG/DL (ref 70–99)
GLUCOSE BLDC GLUCOMTR-MCNC: 274 MG/DL (ref 70–99)
GLUCOSE BLDC GLUCOMTR-MCNC: 288 MG/DL (ref 70–99)
GLUCOSE BLDC GLUCOMTR-MCNC: 317 MG/DL (ref 70–99)
GLUCOSE SERPL-MCNC: 238 MG/DL (ref 70–99)
HCO3 BLDV-SCNC: 35 MMOL/L (ref 21–28)
HCO3 BLDV-SCNC: 36 MMOL/L (ref 21–28)
HCO3 BLDV-SCNC: 37 MMOL/L (ref 21–28)
HCO3 BLDV-SCNC: 40 MMOL/L (ref 21–28)
HCO3 BLDV-SCNC: 41 MMOL/L (ref 21–28)
HCT VFR BLD AUTO: 33.2 % (ref 40–53)
HGB BLD-MCNC: 10.5 G/DL (ref 13.3–17.7)
KCT BLD-ACNC: 127 SEC (ref 75–150)
LMWH PPP CHRO-ACNC: 0.19 IU/ML
MCH RBC QN AUTO: 28.7 PG (ref 26.5–33)
MCHC RBC AUTO-ENTMCNC: 31.6 G/DL (ref 31.5–36.5)
MCV RBC AUTO: 91 FL (ref 78–100)
O2/TOTAL GAS SETTING VFR VENT: ABNORMAL %
OXYHGB MFR BLDV: 54 %
OXYHGB MFR BLDV: 56 %
OXYHGB MFR BLDV: 59 %
OXYHGB MFR BLDV: 59 %
OXYHGB MFR BLDV: 62 %
OXYHGB MFR BLDV: 62 %
OXYHGB MFR BLDV: 63 %
PCO2 BLDV: 46 MM HG (ref 40–50)
PCO2 BLDV: 47 MM HG (ref 40–50)
PCO2 BLDV: 49 MM HG (ref 40–50)
PCO2 BLDV: 50 MM HG (ref 40–50)
PCO2 BLDV: 52 MM HG (ref 40–50)
PCO2 BLDV: 52 MM HG (ref 40–50)
PCO2 BLDV: 53 MM HG (ref 40–50)
PH BLDV: 7.46 PH (ref 7.32–7.43)
PH BLDV: 7.49 PH (ref 7.32–7.43)
PH BLDV: 7.5 PH (ref 7.32–7.43)
PLATELET # BLD AUTO: 216 10E9/L (ref 150–450)
PO2 BLDV: 31 MM HG (ref 25–47)
PO2 BLDV: 32 MM HG (ref 25–47)
PO2 BLDV: 34 MM HG (ref 25–47)
PO2 BLDV: 34 MM HG (ref 25–47)
PO2 BLDV: 35 MM HG (ref 25–47)
POTASSIUM SERPL-SCNC: 3.9 MMOL/L (ref 3.4–5.3)
RADIOLOGIST FLAGS: ABNORMAL
RBC # BLD AUTO: 3.66 10E12/L (ref 4.4–5.9)
SODIUM SERPL-SCNC: 135 MMOL/L (ref 133–144)
SPECIMEN EXP DATE BLD: NORMAL
WBC # BLD AUTO: 9.9 10E9/L (ref 4–11)

## 2018-07-18 PROCEDURE — 25000132 ZZH RX MED GY IP 250 OP 250 PS 637: Performed by: INTERNAL MEDICINE

## 2018-07-18 PROCEDURE — 71045 X-RAY EXAM CHEST 1 VIEW: CPT

## 2018-07-18 PROCEDURE — 25000128 H RX IP 250 OP 636: Performed by: STUDENT IN AN ORGANIZED HEALTH CARE EDUCATION/TRAINING PROGRAM

## 2018-07-18 PROCEDURE — 80048 BASIC METABOLIC PNL TOTAL CA: CPT | Performed by: STUDENT IN AN ORGANIZED HEALTH CARE EDUCATION/TRAINING PROGRAM

## 2018-07-18 PROCEDURE — 40000986 XR CHEST PORT 1 VW

## 2018-07-18 PROCEDURE — 25000132 ZZH RX MED GY IP 250 OP 250 PS 637: Performed by: STUDENT IN AN ORGANIZED HEALTH CARE EDUCATION/TRAINING PROGRAM

## 2018-07-18 PROCEDURE — 86900 BLOOD TYPING SEROLOGIC ABO: CPT | Performed by: INTERNAL MEDICINE

## 2018-07-18 PROCEDURE — 82805 BLOOD GASES W/O2 SATURATION: CPT | Performed by: INTERNAL MEDICINE

## 2018-07-18 PROCEDURE — 25000128 H RX IP 250 OP 636: Performed by: INTERNAL MEDICINE

## 2018-07-18 PROCEDURE — 85347 COAGULATION TIME ACTIVATED: CPT

## 2018-07-18 PROCEDURE — 85520 HEPARIN ASSAY: CPT | Performed by: STUDENT IN AN ORGANIZED HEALTH CARE EDUCATION/TRAINING PROGRAM

## 2018-07-18 PROCEDURE — 86850 RBC ANTIBODY SCREEN: CPT | Performed by: INTERNAL MEDICINE

## 2018-07-18 PROCEDURE — 40000048 ZZH STATISTIC DAILY SWAN MONITORING

## 2018-07-18 PROCEDURE — 40000076 ZZH STATISTIC IABP MONITORING

## 2018-07-18 PROCEDURE — 99292 CRITICAL CARE ADDL 30 MIN: CPT | Mod: GC | Performed by: INTERNAL MEDICINE

## 2018-07-18 PROCEDURE — 86901 BLOOD TYPING SEROLOGIC RH(D): CPT | Performed by: INTERNAL MEDICINE

## 2018-07-18 PROCEDURE — 85027 COMPLETE CBC AUTOMATED: CPT | Performed by: STUDENT IN AN ORGANIZED HEALTH CARE EDUCATION/TRAINING PROGRAM

## 2018-07-18 PROCEDURE — 20000004 ZZH R&B ICU UMMC

## 2018-07-18 PROCEDURE — 40000014 ZZH STATISTIC ARTERIAL MONITORING DAILY

## 2018-07-18 PROCEDURE — 40000275 ZZH STATISTIC RCP TIME EA 10 MIN

## 2018-07-18 PROCEDURE — 33968 REMOVE AORTIC ASSIST DEVICE: CPT

## 2018-07-18 PROCEDURE — 40000196 ZZH STATISTIC RAPCV CVP MONITORING

## 2018-07-18 PROCEDURE — 00000146 ZZHCL STATISTIC GLUCOSE BY METER IP

## 2018-07-18 PROCEDURE — 99291 CRITICAL CARE FIRST HOUR: CPT | Mod: GC | Performed by: INTERNAL MEDICINE

## 2018-07-18 RX ORDER — AMIODARONE HYDROCHLORIDE 200 MG/1
400 TABLET ORAL DAILY
Status: DISCONTINUED | OUTPATIENT
Start: 2018-07-18 | End: 2018-07-21

## 2018-07-18 RX ORDER — FAMOTIDINE 20 MG/1
20 TABLET, FILM COATED ORAL DAILY
Status: DISCONTINUED | OUTPATIENT
Start: 2018-07-19 | End: 2018-07-24 | Stop reason: HOSPADM

## 2018-07-18 RX ORDER — ISOSORBIDE DINITRATE 20 MG/1
20 TABLET ORAL
Status: DISCONTINUED | OUTPATIENT
Start: 2018-07-18 | End: 2018-07-19

## 2018-07-18 RX ORDER — HYDRALAZINE HYDROCHLORIDE 25 MG/1
25 TABLET, FILM COATED ORAL 4 TIMES DAILY
Status: DISCONTINUED | OUTPATIENT
Start: 2018-07-18 | End: 2018-07-19

## 2018-07-18 RX ORDER — DEXTROSE MONOHYDRATE 25 G/50ML
25-50 INJECTION, SOLUTION INTRAVENOUS
Status: DISCONTINUED | OUTPATIENT
Start: 2018-07-18 | End: 2018-07-18

## 2018-07-18 RX ORDER — NICOTINE POLACRILEX 4 MG
15-30 LOZENGE BUCCAL
Status: DISCONTINUED | OUTPATIENT
Start: 2018-07-18 | End: 2018-07-18

## 2018-07-18 RX ORDER — LORAZEPAM 0.5 MG/1
0.5 TABLET ORAL ONCE
Status: COMPLETED | OUTPATIENT
Start: 2018-07-18 | End: 2018-07-18

## 2018-07-18 RX ORDER — POTASSIUM CHLORIDE 750 MG/1
20 TABLET, EXTENDED RELEASE ORAL ONCE
Status: COMPLETED | OUTPATIENT
Start: 2018-07-18 | End: 2018-07-18

## 2018-07-18 RX ADMIN — DOXYCYCLINE HYCLATE 100 MG: 100 CAPSULE ORAL at 20:01

## 2018-07-18 RX ADMIN — CEFDINIR 300 MG: 300 CAPSULE ORAL at 20:01

## 2018-07-18 RX ADMIN — HYDRALAZINE HYDROCHLORIDE 25 MG: 25 TABLET ORAL at 20:01

## 2018-07-18 RX ADMIN — POTASSIUM CHLORIDE 20 MEQ: 750 TABLET, EXTENDED RELEASE ORAL at 12:51

## 2018-07-18 RX ADMIN — SODIUM NITROPRUSSIDE 1 MCG/KG/MIN: 25 INJECTION INTRAVENOUS at 03:20

## 2018-07-18 RX ADMIN — ISOSORBIDE DINITRATE 20 MG: 20 TABLET ORAL at 18:13

## 2018-07-18 RX ADMIN — DOBUTAMINE IN DEXTROSE 2.5 MCG/KG/MIN: 200 INJECTION, SOLUTION INTRAVENOUS at 23:44

## 2018-07-18 RX ADMIN — AMIODARONE HYDROCHLORIDE 400 MG: 200 TABLET ORAL at 10:14

## 2018-07-18 RX ADMIN — INSULIN ASPART 3 UNITS: 100 INJECTION, SOLUTION INTRAVENOUS; SUBCUTANEOUS at 08:07

## 2018-07-18 RX ADMIN — CEFDINIR 300 MG: 300 CAPSULE ORAL at 08:07

## 2018-07-18 RX ADMIN — INSULIN ASPART 3 UNITS: 100 INJECTION, SOLUTION INTRAVENOUS; SUBCUTANEOUS at 00:27

## 2018-07-18 RX ADMIN — LORAZEPAM 0.5 MG: 0.5 TABLET ORAL at 11:45

## 2018-07-18 RX ADMIN — FAMOTIDINE 20 MG: 20 TABLET ORAL at 08:07

## 2018-07-18 RX ADMIN — DOXYCYCLINE HYCLATE 100 MG: 100 CAPSULE ORAL at 08:07

## 2018-07-18 RX ADMIN — INSULIN ASPART 4 UNITS: 100 INJECTION, SOLUTION INTRAVENOUS; SUBCUTANEOUS at 12:51

## 2018-07-18 RX ADMIN — HYDRALAZINE HYDROCHLORIDE 25 MG: 25 TABLET ORAL at 16:05

## 2018-07-18 RX ADMIN — INSULIN ASPART 2 UNITS: 100 INJECTION, SOLUTION INTRAVENOUS; SUBCUTANEOUS at 03:50

## 2018-07-18 RX ADMIN — CLOPIDOGREL 75 MG: 75 TABLET, FILM COATED ORAL at 08:07

## 2018-07-18 RX ADMIN — SODIUM NITROPRUSSIDE 1 MCG/KG/MIN: 25 INJECTION INTRAVENOUS at 14:54

## 2018-07-18 ASSESSMENT — ACTIVITIES OF DAILY LIVING (ADL)
ADLS_ACUITY_SCORE: 9

## 2018-07-18 NOTE — PLAN OF CARE
Problem: Patient Care Overview  Goal: Plan of Care/Patient Progress Review  D/I: Pt A&O x4. SR w/ RBBB. IABP pulled @ 12:00 without complications, right groin site WDL. Post-IABP CI 2.6, CVP 7, PA 30/18, SVR 1157. See flowsheet for additional AMRIT values.  Nipride titrated to 0.25 after IABP removal, MAPs tolerating. Dobutamine gtt @ 2.5. BG >300 in afternoon, MD aware and sliding scale adjusted to high intensity and evening lantus increased. [place] removed @ 16:30.       A/P: Plan to remove [place] once Nipride gtt off. Monitor BG closely, consider insulin gtt if BG remains persistently elevated. Notify team of changes.      Problem: Cardiac Disease Comorbidity  Goal: Cardiac Disease  Patient comorbidity will be monitored for signs and symptoms of Cardiac Disease.  Problems will be absent, minimized or managed by discharge/transition of care.   See above. IABP removed.     Problem: Diabetes Comorbidity  Goal: Diabetes  Patient comorbidity will be monitored for signs and symptoms of hyperglycemia or hypoglycemia. Problems will be absent, minimized or managed by discharge/transition of care.   See above note. BG elevated throughout shift.

## 2018-07-18 NOTE — CONSULTS
Patient seen in the hospital on patient care unit 4E for psychosocial assessment. It has not been decided if this patient is a medical candidate yet for VAD, but there is suspicion he may need this eventually. Per morenita Trammell for SW to conduct psychosocial evaluation for potential VAD workup. 60 minutes spent with patient. 100% of visit consisted of counseling related to issues surrounding Mechanical Circulatory Support.    Psychosocial Assessment   Name: Mariusz Sharp     MRN:  [id number]        Patient Name / Age / Race: Mariusz Sharp 57 year old    Source of Information: Patient   : Christine Rainey   Interview Date: July 17, 2018   Reason for Referral: Mechanical Circulatory Support     Current Living Situation   Location (City/State): [address] [place] [id number]   With Whom: Living arrangements - Lives with 81 y/o Father, whom he helps to take care of   Type of Home: single family; Dad's home   Working Phone? Yes    [employer] Outlet? Yes    Distance from Hospital: 2 hours   Need to Relocate Post Surgery? Yes    Discussed? 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   Education: Some college (studied Physical therapy)   ? No   Income: salary/wages   Insurance: Private Insurance   Name of Private Insurance: [employer]   Medication Coverage: See above    In current situation, pt. can afford medication and supply costs:  Yes ; reports $10copays   Other Financial Concerns/Issues: None currently. States he is able to afford monthly expenses. Reports he has Short-term and Long-term disability     Family/Social Support   Marital Status:    Partner Name: Currently in relationship with Lisa last 15 years off and on.   Length of Time : Had been  for about 12-13 years.  in [id number]   Partner s Employment: Lisa is  an RN   Relationship: stable/supportive   Children: 3 Sons:  Oldest is Salo (age 34)  with 2 kids. Middle son is Andrew (age 30) not , but has 4 jobs. Youngest Son is Maurice, age 14 who lives with Mother, Lisa. Everyone in Family lives in [place]. Patient also had 2 other Sons who  of [place]. One  shortly after birth and the other one  at the age of 24.   Parents: Mom passed away 5 years ago (had dementia). 83 y/o Father has Alzheimers. Able to manage ADLs and personal hygiene, but Patient sets up Dad's medications   Siblings: One Brother, Romeo in [place]-close relationship   Other Family or Friends Close by: Friend, Ryle who lives close by. Pt used to work with Ryle. She is  with 2 kids, but willing to help.   Support System: available, helpful   Primary Support Person: Lisa, Brother (Romeo)   Issues: None-but would like to meet caregivers so they can program expectations and requirements.     Activities/Functional Ability   Current Level: ambulatory and independent with ADL's   Daily Activities: Had been working full-time and mountain biking and managing household tasks until .   Transportation: self ; but reports that after his ICD shocked him, he has been told he can't drive for 6 months. States Lisa will most likely provide all transportation     Medical Status   Patient s understanding of need for surgical intervention: Acceptable for now. He had been informed of the LVAD and asked good questions. This writer was the first on the VAD team to discuss the VAD with him.   Advanced Directive? No    Details: 2 older Sons would be decision-makers. He would prefer to complete HCD and have Lisa be HCA   Adherence History: Reports no medical problems or history, other than diabetes. He reports being prescribed Glipizide and Metformin. However, he reports thinking he could manage his diabetes through diet. Therefore, he did not take or fill these medications.   Ability to Adhere to  Complex Medical Regime: Yes     Behavioral   Chemical Dependency Issues: No   Smoker? No   Psychiatric impairment: No   Coping Style/Strategies: Reports he has good coping skills (got through 2 Sons with MD). Enjoys mountain biking and walking. Loves to fish and stay active.   Recent Legal History: No   Teaching Completed During Assessment Related To Mechanical Circulatory Support: 1. Housing and relocation needs post surgery.  2. Caregiver needs post surgery.  3. Financial issues related to surgery.  4. Risks of alcohol use post surgery.  5. Common psychosocial stressors pre/post surgery.  6. Support group availability.   Psychosocial Risks Reviewed Related To Mechanical Circulatory Support: 1. Increased stress related to your emotional, family, social, employment, or financial situation.  2. Affect on work and/or disability benefits.  3. Affect on future health and life insurance.  4. Outcome expectations may not be met.  5. Mental Health risks: anxiety, depression, PTSD, guilt, grief and chronic fatigue.     Observed Behavior   Well informed? Yes  Angry? No   Process info well? Yes  Hostile? No   Evasive? No Oriented X3? Yes    Cautious/Suspicious? No Motivated? Yes    Appropriate Behavior? Yes  Depressed? No   Appropriate Affect? Yes  Anxious? No   Interview Observations: Good eye contact although laying in bed hooked up to IABP. Seemed to process information well. Friend was present for first part of interview and seemed supportive.     Selection Criteria Met   Plan for Support Yes ; but someone from VAD team needs to meet with family to make sure they hear caregiver requirements.   Chemical Dependence Yes    Smoking Yes    Mental Health Yes    Adequate Finances Yes      Risk Issues:    None-but haven't met family or those identified as potential caregivers yet    Final Evaluation/Assessment:     No psychosocial barriers identified toward pursuing LVAD if needed. He identified his ex-fiance, Lisa, his Brother,  Romeo, and his friend, Ryle, as the 3 people most likely to provide care for him post-VAD. He doesn't have any issues with mental health or Chem Dep. He has health insurance through his employer and has [employer] and [employer]. He indicates that he would most likely stay with Lisa once in [place] and will try and coordinate assistance for Father to live in assisted living, as patient is realistic that he will not be able to care for him if he undergoes LVAD. Living locally may be financial challenging, but he reports that his Dad will most likely pay for local lodging.    Patient understands risks and benefits of Mechanical Circulatory Support: Yes     Recommendation:    Acceptable, but need to meet with family first    Signature: Christine Rainey     Title: Licensed Independent Clinical                Interview Date: July 17, 2018

## 2018-07-18 NOTE — PLAN OF CARE
Problem: Diabetes Comorbidity  Goal: Diabetes  Patient comorbidity will be monitored for signs and symptoms of hyperglycemia or hypoglycemia. Problems will be absent, minimized or managed by discharge/transition of care.  Outcome: Improving  BG decreasing. Q4 hr accuchecks.   Continue to monitor bg, continue to treat per MAR.

## 2018-07-18 NOTE — PROGRESS NOTES
CARDIOLOGY PROGRESS NOTE    SUBJECTIVE:  IABP weaned to 1:3 yesterday and patient was started on Nipride gtt with improvement in SVR and increase in CI. Patient overall feels well and has no active concerns this AM.     IABP pulled this AM without complication. Nipride gtt continued.     ROS otherwise negative.    OBJECTIVE:  Vital signs:  BP range 83/63-93/65  HR 75-79  RR 16-17  SpO2 94%-95% on 4L NC  Tm 98.3F    Vitals:    07/16/18 2000 07/17/18 0400 07/18/18 0500   Weight: 79 kg (174 lb 2.6 oz) 78.4 kg (172 lb 13.5 oz) 78.1 kg (172 lb 2.9 oz)       I/O:   Intake: +1613 (+301)  Output: -3275 (-520)  NET: -1661 (-219)    Hemodynamics (Pre-IABP removal)  CVP 8  PAP 40/20  PCWP 20  SVR 1172  CO/CI: 4.6/2.4    PHYSICAL EXAM:  General: AAO x 3,  no clubbing/cyanosis, no edema, JVP at mid neck   HEENT:  PERRL, MMM with out pharyngeal erythema.   Cardiac: RRR. No m/r/g. Normal S1, S2. DP2+ bilaterally  Pulm: CTAB, no wheezes, no crackles.  Abd: +BS, soft, non distended, non tender. No hepatosplenomegaly.  Skin: No rash  MSK: No deformities, no joint swelling, warm and well perfused    Neuro: CN grossly intact, no focal deficits  Psych: Appropriate mood, full affect    Recent Labs   Lab Test  07/18/18   0350   07/17/18   0410   HGB  10.5*   < >  11.7*   HCT  33.2*   < >  37.3*   WBC  9.9   < >  10.0   MCV  91   < >  91   MCH  28.7   < >  28.7   MCHC  31.6   < >  31.4*   RDW  13.6   < >  13.7   PLT  216   < >  233   NA  135   < >  132*   POTASSIUM  3.9   < >  3.9   CHLORIDE  90*   < >  85*   CO2  38*   < >  43*   BUN  86*   < >  80*   CR  2.47*   < >  2.72*   GLC  238*   < >  249*   ARLENE  8.6   < >  8.9   ALBUMIN   --    --   2.6*   BILITOTAL   --    --   1.0   ALKPHOS   --    --   171*   AST   --    --   80*   ALT   --    --   142*    < > = values in this interval not displayed.     Cr: 2.72-->2.71-->2.61-->2.47    Imaging:  CXR, 7/18/2018:  Stanford-Calvin catheter tip projects over the right main  pulmonary  artery.    Infusions:  Dobutaine 2.5 mcg/kg/min  Nitroprusside 1 mcg/kg/min  Amiodarone 0.5 mg/min  Heparin 650 units/hr    Medications:  Cefdinir 300 mg q12 hrs  Clopidogrel 75 mg daily  Doxycycline 100 mg q12 hrs  Famotidine 20 mg BID  Glargine 20 mg qHS    ASSESSMENT/PLAN:  Mr. Mariusz Sharp is a 57-year-old male with medical history significant for recent STEMI (6/27) s/p PCI with ALYSSA to ostial LAD, type 2 DM and CKD stage 3 who was readmitted to [facility] on 7/2 with VT s/p DCCV, the admission was complicated by cardiogenic shock with new LVEF 15% (see more detials below). He was transferred to [facility] on 7/16 for further management.              Neurology  No active issues, no sedation needed.      Respiratory  # Bronchitis    From care everywhere, patient developed leukocytosis and elevated pro-calcitonin, no infiltration on CXR. Ceftriaxone and doxycyclin was started on 7/14 for bronchitis. Now on cefdinir and doxycyline. No leukocytosis on admission. No respiratory symptoms.      - Continue cefdinir 300 mg bid and doxycycline 100 mg bid, plan for 5 days. Today is day 3/7.          Cardiovascular  # Cardiogenic shock   # HFrEF, ICM, LVEF 15%    ACC stage C, NYHA III, TTE (7/5) showed LVEF 15%, with multiple wall motion abnormalities, likely ischemic. Per care everywhere, patient developed cardiogenic shock on 7/11, although initial White numbers not available. White and IABP were placed. He received milrinone gtt and diuresis with furosemide gtt, prn chlorothiazide and metolazone. At [facility], CVP 12, PCWP 24, CI 2.2 and lactate 1.1 (on milrinone gtt 0.25 en route). He was well perfused from exam. Patient switched from Milrinone to Dobutamine on arrival with CI holding 1.8-2.0. Patient diuresed well with CVP falling to 5 and PCWP 14.     - Telemetry, strict I&O, weight daily  - Diuresis: Hold diuresis today for PCWP and CVP at target.  - Inotrope: Dobutamine 2.5 mcg/kg/min  - ACEI/ARB: hold off for now due to  ANDREW   - Beta blocker: hold off for now due to cardiogenic shock   - S/p IABP removed. On Nipride 1 mcg/kg/min, will wean today pending repeat hemodynamics and consider starting Hydralazine/Isordil  - LVAD work-up pending insurance approval.      # Recent STEMI s/p PCI with ALYSSA to ostial LAD (6/27)   Troponin on admission 1.46, no chest pain    - Continue Plavix 75 mg daily (no aspirin due to heparin gtt)    - Atorvastatin was held for now due to elevated AST and ALT        # VT s/p ICD  Patient developed VT on 7/2 s/p ICD placement, S/p ICD shocked x 2 on 7/16 prior to transfer.  - EP following appreciate recs  - Start Amiodarone 400 mg daily     # Atrial flutter   # LV thrombus    Patient developed atrial flutter on 7/2. TTE on 7/5 showed LV clot. [id number] 2 (heart failure and diabetes). EKG on arrival showed NSR.           - Low dose intensity heparin gtt  - Start warfarin tonight      Renal  # Non-oliguric ANDREW on CKD stage III   Baseline creatinine around 1-1.2 (7/2), creatinines from 7/5 to 7/16 were persistently > 3. Cr on admission at [facility] is 2.79.      - Continue to monitor   - Avoid nephrotoxic medications  - Maintain hemodynamic      - Hold diuretics for rest of today      Infectious disease  # Bronchitis   - Ceftriaxone 2 g IV (7/14)  - Cefdinir 300 mg bid (7/15-present), Day 4/5  - Doxycycline 100 mg bid (7/14-present), Day 4/5      Gastrointestinal  No active issues       # Nutrition  Consistent carb with 2 g sodium and 1.5 L fluid restriction       # Stress ulcer Prophylaxis: Famotidine 20 mg PO bid       Endocrine  # Type 2 DM   Last HGBA1C 15 (6/2018), taking glargine 50 units with aspart carb count    - Increase Glargine to 40 units daily for hypoglyemia  - Increase sliding scale insulin to high intensity  - Diabetes education consult      Heme/Onc  # Normocytic anemia   Likely chronic  - Continue to monitor        # DVT Prophylaxis: Heparin gtt      # Indwelling lines  Vascular access: PIV    Other lines: [place] [facility]  Perez's catheter       # Code Status: Full code      Seen and staffed with Dr. Ortiz.     Tony Fragoso MD  Cardiology Fellow, PGY-5  Pager #[phone]        Critical Care ICU Note - Cardiology  Jenni Ortiz M.D.     The patient remains unstable in the ICU with on-going need for parenteral medications for the adjustment of blood pressure and cardiac output and maintenance of renal function.       The patient is seen for cardiogenic shock necessitating inotropic agents, vasopressors and afterload reducing agents; limited mechanical support requiring IABP; acute renal failure requiring fluid and diuretic management all to maintain blood pressure and secondary organ function     I personally reviewed:   Hemodynamic parameters obtained by central hemodynamic monitoring including RAP, estimated LVEDP, pulmonary artery pressure, cardiac output and vascular resistances in order to adjust fluids and infused medications for blood pressure and cardiac output maintenance.  Ventilatory settings and/or supplement oxygen needs in order to obtain optimal oxygenation and electrolyte balance at low possible pressure support and inspired oxygen tension     Pt's hemodynamics tolerated 1:3 IABP overnight thus IABP removed this am.  Pt remained hemodynamically stable off IABP.  Transitioned from parenteral or oral vasodilatory regimen and PA catheter removed.  Will continue to optimize hemodynamics and heart failure regimen.  Consider transfer out of ICU tomorrow if remains stable overnight.  Will begin advanced therapy evaluation once insurance approval obtained however at present no acute indication for advanced therapies.      Jenni Ortiz MD  Section Head - Advanced Heart Failure, Transplantation and Mechanical Circulatory Support  Co-Director - Adult [facility]enital [facility] Cardiovascular [facility]  Associate Professor of Medicine, [facility]     I spent 60 minutes in critical care of the  patient including 40 minutes of direct patient care including serial assessments and discussion with the patient and patient's care team.

## 2018-07-18 NOTE — PLAN OF CARE
Problem: Patient Care Overview  Goal: Plan of Care/Patient Progress Review  Outcome: Improving  D/I: AOX4. IABP 1:3, SR with BBB, MAP goal >65 <75. MAP within goal. Nipride at 1. CI  2.4. SVR 1241.1. PA catheter noted to be at 53, MD notified, xray completed (see results). Afebrile. On 4 L NC, bloody oral/nasal secretions with clots - MD aware. Secretions have decreased in last 3-4 hours. Urine output approx 200 ml Q2 hr. BM 7/17 day shift.   P: Continue to monitor, continue to follow plan of care, possible d/c of IABP

## 2018-07-19 ENCOUNTER — APPOINTMENT (OUTPATIENT)
Dept: OCCUPATIONAL THERAPY | Facility: CLINIC | Age: 58
DRG: 270 | End: 2018-07-19
Attending: INTERNAL MEDICINE
Payer: COMMERCIAL

## 2018-07-19 LAB
ALBUMIN SERPL-MCNC: 2.6 G/DL (ref 3.4–5)
ALP SERPL-CCNC: 121 U/L (ref 40–150)
ALT SERPL W P-5'-P-CCNC: 88 U/L (ref 0–70)
ANION GAP SERPL CALCULATED.3IONS-SCNC: 9 MMOL/L (ref 3–14)
AST SERPL W P-5'-P-CCNC: 39 U/L (ref 0–45)
BASE EXCESS BLDV CALC-SCNC: 10.1 MMOL/L
BASE EXCESS BLDV CALC-SCNC: 10.4 MMOL/L
BASE EXCESS BLDV CALC-SCNC: 4.8 MMOL/L
BASE EXCESS BLDV CALC-SCNC: 6.6 MMOL/L
BASE EXCESS BLDV CALC-SCNC: 9.2 MMOL/L
BILIRUB DIRECT SERPL-MCNC: 0.3 MG/DL (ref 0–0.2)
BILIRUB SERPL-MCNC: 1 MG/DL (ref 0.2–1.3)
BUN SERPL-MCNC: 72 MG/DL (ref 7–30)
CALCIUM SERPL-MCNC: 8.8 MG/DL (ref 8.5–10.1)
CHLORIDE SERPL-SCNC: 98 MMOL/L (ref 94–109)
CO2 SERPL-SCNC: 31 MMOL/L (ref 20–32)
CREAT SERPL-MCNC: 1.83 MG/DL (ref 0.66–1.25)
ERYTHROCYTE [DISTWIDTH] IN BLOOD BY AUTOMATED COUNT: 13.5 % (ref 10–15)
ERYTHROCYTE [DISTWIDTH] IN BLOOD BY AUTOMATED COUNT: 13.7 % (ref 10–15)
GFR SERPL CREATININE-BSD FRML MDRD: 39 ML/MIN/1.7M2
GLUCOSE BLDC GLUCOMTR-MCNC: 107 MG/DL (ref 70–99)
GLUCOSE BLDC GLUCOMTR-MCNC: 230 MG/DL (ref 70–99)
GLUCOSE BLDC GLUCOMTR-MCNC: 308 MG/DL (ref 70–99)
GLUCOSE SERPL-MCNC: 157 MG/DL (ref 70–99)
HCO3 BLDV-SCNC: 29 MMOL/L (ref 21–28)
HCO3 BLDV-SCNC: 32 MMOL/L (ref 21–28)
HCO3 BLDV-SCNC: 35 MMOL/L (ref 21–28)
HCT VFR BLD AUTO: 32.3 % (ref 40–53)
HCT VFR BLD AUTO: 34.4 % (ref 40–53)
HGB BLD-MCNC: 10.3 G/DL (ref 13.3–17.7)
HGB BLD-MCNC: 10.8 G/DL (ref 13.3–17.7)
INR PPP: 1.18 (ref 0.86–1.14)
LMWH PPP CHRO-ACNC: 0.58 IU/ML
LMWH PPP CHRO-ACNC: <0.1 IU/ML
MCH RBC QN AUTO: 28.7 PG (ref 26.5–33)
MCH RBC QN AUTO: 28.9 PG (ref 26.5–33)
MCHC RBC AUTO-ENTMCNC: 31.4 G/DL (ref 31.5–36.5)
MCHC RBC AUTO-ENTMCNC: 31.9 G/DL (ref 31.5–36.5)
MCV RBC AUTO: 91 FL (ref 78–100)
MCV RBC AUTO: 92 FL (ref 78–100)
O2/TOTAL GAS SETTING VFR VENT: 21 %
O2/TOTAL GAS SETTING VFR VENT: ABNORMAL %
OXYHGB MFR BLDV: 43 %
OXYHGB MFR BLDV: 43 %
OXYHGB MFR BLDV: 49 %
OXYHGB MFR BLDV: 49 %
OXYHGB MFR BLDV: 58 %
PCO2 BLDV: 43 MM HG (ref 40–50)
PCO2 BLDV: 47 MM HG (ref 40–50)
PCO2 BLDV: 48 MM HG (ref 40–50)
PCO2 BLDV: 50 MM HG (ref 40–50)
PCO2 BLDV: 50 MM HG (ref 40–50)
PH BLDV: 7.44 PH (ref 7.32–7.43)
PH BLDV: 7.44 PH (ref 7.32–7.43)
PH BLDV: 7.45 PH (ref 7.32–7.43)
PH BLDV: 7.46 PH (ref 7.32–7.43)
PH BLDV: 7.47 PH (ref 7.32–7.43)
PLATELET # BLD AUTO: 188 10E9/L (ref 150–450)
PLATELET # BLD AUTO: 196 10E9/L (ref 150–450)
PO2 BLDV: 26 MM HG (ref 25–47)
PO2 BLDV: 26 MM HG (ref 25–47)
PO2 BLDV: 28 MM HG (ref 25–47)
PO2 BLDV: 29 MM HG (ref 25–47)
PO2 BLDV: 32 MM HG (ref 25–47)
POTASSIUM SERPL-SCNC: 3.6 MMOL/L (ref 3.4–5.3)
PROT SERPL-MCNC: 7.1 G/DL (ref 6.8–8.8)
RBC # BLD AUTO: 3.57 10E12/L (ref 4.4–5.9)
RBC # BLD AUTO: 3.76 10E12/L (ref 4.4–5.9)
SODIUM SERPL-SCNC: 138 MMOL/L (ref 133–144)
WBC # BLD AUTO: 8.7 10E9/L (ref 4–11)
WBC # BLD AUTO: 8.7 10E9/L (ref 4–11)

## 2018-07-19 PROCEDURE — 25000132 ZZH RX MED GY IP 250 OP 250 PS 637: Performed by: INTERNAL MEDICINE

## 2018-07-19 PROCEDURE — 40000275 ZZH STATISTIC RCP TIME EA 10 MIN

## 2018-07-19 PROCEDURE — 94640 AIRWAY INHALATION TREATMENT: CPT

## 2018-07-19 PROCEDURE — 85610 PROTHROMBIN TIME: CPT | Performed by: INTERNAL MEDICINE

## 2018-07-19 PROCEDURE — 80048 BASIC METABOLIC PNL TOTAL CA: CPT | Performed by: INTERNAL MEDICINE

## 2018-07-19 PROCEDURE — 40000133 ZZH STATISTIC OT WARD VISIT: Performed by: OCCUPATIONAL THERAPIST

## 2018-07-19 PROCEDURE — 82805 BLOOD GASES W/O2 SATURATION: CPT | Performed by: INTERNAL MEDICINE

## 2018-07-19 PROCEDURE — 99292 CRITICAL CARE ADDL 30 MIN: CPT | Mod: GC | Performed by: INTERNAL MEDICINE

## 2018-07-19 PROCEDURE — 97535 SELF CARE MNGMENT TRAINING: CPT | Mod: GO | Performed by: OCCUPATIONAL THERAPIST

## 2018-07-19 PROCEDURE — 25000128 H RX IP 250 OP 636: Performed by: INTERNAL MEDICINE

## 2018-07-19 PROCEDURE — 85027 COMPLETE CBC AUTOMATED: CPT | Performed by: INTERNAL MEDICINE

## 2018-07-19 PROCEDURE — 25000125 ZZHC RX 250: Performed by: INTERNAL MEDICINE

## 2018-07-19 PROCEDURE — 25000132 ZZH RX MED GY IP 250 OP 250 PS 637: Performed by: STUDENT IN AN ORGANIZED HEALTH CARE EDUCATION/TRAINING PROGRAM

## 2018-07-19 PROCEDURE — 85520 HEPARIN ASSAY: CPT | Performed by: INTERNAL MEDICINE

## 2018-07-19 PROCEDURE — 80076 HEPATIC FUNCTION PANEL: CPT | Performed by: INTERNAL MEDICINE

## 2018-07-19 PROCEDURE — 94640 AIRWAY INHALATION TREATMENT: CPT | Mod: 76

## 2018-07-19 PROCEDURE — 20000004 ZZH R&B ICU UMMC

## 2018-07-19 PROCEDURE — 97165 OT EVAL LOW COMPLEX 30 MIN: CPT | Mod: GO | Performed by: OCCUPATIONAL THERAPIST

## 2018-07-19 PROCEDURE — 00000146 ZZHCL STATISTIC GLUCOSE BY METER IP

## 2018-07-19 PROCEDURE — 99291 CRITICAL CARE FIRST HOUR: CPT | Mod: GC | Performed by: INTERNAL MEDICINE

## 2018-07-19 PROCEDURE — 97110 THERAPEUTIC EXERCISES: CPT | Mod: GO | Performed by: OCCUPATIONAL THERAPIST

## 2018-07-19 RX ORDER — IPRATROPIUM BROMIDE AND ALBUTEROL SULFATE 2.5; .5 MG/3ML; MG/3ML
3 SOLUTION RESPIRATORY (INHALATION)
Status: DISCONTINUED | OUTPATIENT
Start: 2018-07-19 | End: 2018-07-21

## 2018-07-19 RX ORDER — ASPIRIN 81 MG/1
81 TABLET, CHEWABLE ORAL DAILY
Status: DISCONTINUED | OUTPATIENT
Start: 2018-07-19 | End: 2018-07-24 | Stop reason: HOSPADM

## 2018-07-19 RX ORDER — ISOSORBIDE DINITRATE 10 MG/1
10 TABLET ORAL
Status: COMPLETED | OUTPATIENT
Start: 2018-07-19 | End: 2018-07-21

## 2018-07-19 RX ORDER — ROSUVASTATIN CALCIUM 20 MG/1
20 TABLET, COATED ORAL AT BEDTIME
Status: DISCONTINUED | OUTPATIENT
Start: 2018-07-19 | End: 2018-07-24 | Stop reason: HOSPADM

## 2018-07-19 RX ORDER — HEPARIN SODIUM 10000 [USP'U]/100ML
0-3500 INJECTION, SOLUTION INTRAVENOUS CONTINUOUS
Status: DISCONTINUED | OUTPATIENT
Start: 2018-07-19 | End: 2018-07-21

## 2018-07-19 RX ORDER — GUAIFENESIN/DEXTROMETHORPHAN 100-10MG/5
5 SYRUP ORAL EVERY 4 HOURS PRN
Status: DISCONTINUED | OUTPATIENT
Start: 2018-07-19 | End: 2018-07-24 | Stop reason: HOSPADM

## 2018-07-19 RX ORDER — ROSUVASTATIN CALCIUM 10 MG/1
20 TABLET, COATED ORAL DAILY
Status: DISCONTINUED | OUTPATIENT
Start: 2018-07-19 | End: 2018-07-19

## 2018-07-19 RX ORDER — DIGOXIN 125 MCG
125 TABLET ORAL DAILY
Status: DISCONTINUED | OUTPATIENT
Start: 2018-07-19 | End: 2018-07-24 | Stop reason: HOSPADM

## 2018-07-19 RX ORDER — WARFARIN SODIUM 5 MG/1
5 TABLET ORAL
Status: COMPLETED | OUTPATIENT
Start: 2018-07-19 | End: 2018-07-19

## 2018-07-19 RX ADMIN — AMIODARONE HYDROCHLORIDE 400 MG: 200 TABLET ORAL at 07:52

## 2018-07-19 RX ADMIN — WARFARIN SODIUM 5 MG: 5 TABLET ORAL at 18:25

## 2018-07-19 RX ADMIN — HEPARIN SODIUM 1400 UNITS/HR: 10000 INJECTION, SOLUTION INTRAVENOUS at 07:53

## 2018-07-19 RX ADMIN — DOXYCYCLINE HYCLATE 100 MG: 100 CAPSULE ORAL at 20:07

## 2018-07-19 RX ADMIN — IPRATROPIUM BROMIDE AND ALBUTEROL SULFATE 3 ML: .5; 3 SOLUTION RESPIRATORY (INHALATION) at 22:14

## 2018-07-19 RX ADMIN — ISOSORBIDE DINITRATE 10 MG: 10 TABLET ORAL at 18:25

## 2018-07-19 RX ADMIN — ISOSORBIDE DINITRATE 20 MG: 20 TABLET ORAL at 12:21

## 2018-07-19 RX ADMIN — GUAIFENESIN AND DEXTROMETHORPHAN 5 ML: 100; 10 SYRUP ORAL at 22:52

## 2018-07-19 RX ADMIN — FAMOTIDINE 20 MG: 20 TABLET ORAL at 07:51

## 2018-07-19 RX ADMIN — ISOSORBIDE DINITRATE 20 MG: 20 TABLET ORAL at 07:51

## 2018-07-19 RX ADMIN — Medication 37.5 MG: at 18:25

## 2018-07-19 RX ADMIN — CEFDINIR 300 MG: 300 CAPSULE ORAL at 20:07

## 2018-07-19 RX ADMIN — DIGOXIN 125 MCG: 125 TABLET ORAL at 11:25

## 2018-07-19 RX ADMIN — ROSUVASTATIN CALCIUM 20 MG: 20 TABLET, FILM COATED ORAL at 22:28

## 2018-07-19 RX ADMIN — IPRATROPIUM BROMIDE AND ALBUTEROL SULFATE 3 ML: .5; 3 SOLUTION RESPIRATORY (INHALATION) at 17:10

## 2018-07-19 RX ADMIN — ASPIRIN 81 MG CHEWABLE TABLET 81 MG: 81 TABLET CHEWABLE at 11:25

## 2018-07-19 RX ADMIN — DOXYCYCLINE HYCLATE 100 MG: 100 CAPSULE ORAL at 07:52

## 2018-07-19 RX ADMIN — CLOPIDOGREL 75 MG: 75 TABLET, FILM COATED ORAL at 07:51

## 2018-07-19 RX ADMIN — CEFDINIR 300 MG: 300 CAPSULE ORAL at 09:52

## 2018-07-19 ASSESSMENT — ACTIVITIES OF DAILY LIVING (ADL)
PREVIOUS_RESPONSIBILITIES: MEAL PREP;HOUSEKEEPING;LAUNDRY;SHOPPING;YARDWORK;MEDICATION MANAGEMENT;FINANCES;DRIVING;WORK
ADLS_ACUITY_SCORE: 9

## 2018-07-19 NOTE — PROGRESS NOTES
1013: 0800 Hydralazine given, per verbal order from Cardiology. Dobutamine also stopped, as ordered.

## 2018-07-19 NOTE — PLAN OF CARE
Problem: Patient Care Overview  Goal: Plan of Care/Patient Progress Review  Discharge Planner OT   Patient plan for discharge: home to friends home (ex fiance) with assist and outpatient CR as indicated.   Current status: pt ambulating in hallway 660 feet and otherwise mod I with simple ADL's in room.   Barriers to return to prior living situation: decreased activity tolerance.   Recommendations for discharge: home to ex fiances home with assist as needed and outpatient CR.   Rationale for recommendations: Pt maintains sufficient strength and endurance to manage basic ADL's at home, will benefit from continued conditioning to maximize functional endurance.        Entered by: Michael Linn 07/19/2018 3:54 PM

## 2018-07-19 NOTE — PROGRESS NOTES
"CARDIOLOGY PROGRESS NOTE    SUBJECTIVE:  Overnight no acute events. Had Robert Calvin pulled after nipride turned off. This morning had hypotension to MAP 58-60 after am dose of isordil.     ROS otherwise negative.    OBJECTIVE:  Vital signs:  Vital signs:  Temp: 97.6  F (36.4  C) Temp src: Axillary BP: 100/74   Heart Rate: 74 Resp: 16 SpO2: 98 % O2 Device: None (Room air) Oxygen Delivery: 2 LPM Height: 162.6 cm (5' 4\") Weight: 77 kg (169 lb 12.1 oz)  Estimated body mass index is 29.14 kg/(m^2) as calculated from the following:    Height as of this encounter: 1.626 m (5' 4\").    Weight as of this encounter: 77 kg (169 lb 12.1 oz).    Vitals:    07/17/18 0400 07/18/18 0500 07/19/18 0630   Weight: 78.4 kg (172 lb 13.5 oz) 78.1 kg (172 lb 2.9 oz) 77 kg (169 lb 12.1 oz)     Intake/Output Summary (Last 24 hours) at 07/19/18 0732  Last data filed at 07/19/18 0700   Gross per 24 hour   Intake           706.73 ml   Output             2455 ml   Net         -1748.27 ml     Hemodynamics Reviewed (from prior to pulling Robert)  CVP of 3 this AM from PICC line      PHYSICAL EXAM:  General: AAO x 3,  no clubbing/cyanosis, no edema, JVP at mid neck   HEENT:  PERRL, MMM with out pharyngeal erythema.   Cardiac: RRR. No m/r/g. Normal S1, S2. DP2+ bilaterally  Pulm: CTAB, no wheezes, no crackles.  Abd: +BS, soft, non distended, non tender. No hepatosplenomegaly.  Skin: No rash  MSK: No deformities, no joint swelling, warm and well perfused    Neuro: CN grossly intact, no focal deficits  Psych: Appropriate mood, full affect    Infusions:  Dobutamine 2.5 mcg/kg/min  Heparin 650 units/hr    ASSESSMENT/PLAN:  Mr. Mariusz Sharp is a 57-year-old male with medical history significant for recent STEMI (6/27) s/p PCI with ALYSSA to ostial LAD, type 2 DM and CKD stage 3 who was readmitted to [facility] on 7/2 with VT s/p DCCV, the admission was complicated by cardiogenic shock with new LVEF 15% (see more detials below). He was transferred to [facility] " on 7/16 for further management.      CHANGES TODAY  - Stop Dobutamine  - Increase hydralazine to 37.5mg QID; Drop Isordil to 10mg TID d/t hypotension following 2 doses  - CVP 3/4. Hold diuresis. Relax volume restriction from 1.5L to 2L  -  Insurance approval for VAD granted. Will have SW, Neuropsych, and VAD coordinator see patient.  - ANDREW improving  - Start Aspirin 81mg qday  - Start Crestor 20mg HS as LFTs improving       Neurology  No active issues, no sedation needed.      Respiratory  # Acute Bronchitis    From care everywhere, patient developed leukocytosis and elevated pro-calcitonin, no infiltration on CXR. Ceftriaxone and doxycycline was started on 7/16 for bronchitis. Now on cefdinir and doxycyline. No leukocytosis on admission. No respiratory symptoms.      - Finish out 5 day course of doxycycline and cefdinir on 6/21/18  - Duoneb QID        Cardiovascular  # Cardiogenic shock   # HFrEF, ICM, LVEF 15%    ACC stage C, NYHA III, TTE (7/5) showed LVEF 15%, with multiple wall motion abnormalities, likely ischemic. Per care everywhere, patient developed cardiogenic shock on 7/11, although initial [place] numbers not available. [place] and IABP were placed. He received milrinone gtt and diuresis with furosemide gtt, prn chlorothiazide and metolazone. At [facility], CVP 12, PCWP 24, CI 2.2 and lactate 1.1 (on milrinone gtt 0.25 en route). He was well perfused from exam. Patient switched from Milrinone to Dobutamine on arrival with CI holding 1.8-2.0. Patient diuresed well with CVP falling to 5 and PCWP 14.     - Telemetry, strict I&O, weight daily  - Diuresis: Hold diuresis today for PCWP and CVP at target.  - Inotrope: Off of dobutamine   - ACEI/ARB: hold off for now due to ANDREW   - Beta blocker: hold off for now due to cardiogenic shock   - S/p IABP removed.   - LVAD work-up initiated with VAD show and tell, SW and Neuropsych consult.  - Isordil 10mg TID and Hydral 37.5 QID   - Follow MVO2   - Cardiac Rehab consult placed        # Recent STEMI s/p PCI with ALYSSA to ostial LAD (6/27)   Troponin on admission 1.46, no chest pain    - Continue Plavix 75 mg daily, Start ASA 81mg qday  - Heparin GTT   - Start Crestor 7/19/18      # VT s/p ICD  Patient developed VT on 7/2 s/p ICD placement, S/p ICD shocked x 2 on 7/16 prior to transfer.  - EP following appreciate recs  - Start Amiodarone 400 mg daily     # Atrial flutter   # LV thrombus    Patient developed atrial flutter on 7/2. TTE on 7/5 showed LV clot. [id number] 2 (heart failure and diabetes). EKG on arrival showed NSR.    - High intensity heparin gtt  - Start warfarin tonight      Renal  # Non-oliguric ANDREW on CKD stage III   Baseline creatinine around 1-1.2 (7/2), creatinines from 7/5 to 7/16 were persistently > 3. Cr on admission at [facility] is 2.79.      - Continue to monitor   - Avoid nephrotoxic medications  - Maintain hemodynamic      - Hold diuretics for rest of today      Infectious disease  # Bronchitis   - Ceftriaxone 2 g IV (7/14)  - Cefdinir 300 mg bid (7/16-present), Day 3/5  - Doxycycline 100 mg bid (7/16-present), Day 3/5      Gastrointestinal  No active issues       # Nutrition  Consistent carb with 2 g sodium and 1.5 L fluid restriction       # Stress ulcer Prophylaxis: Famotidine 20 mg PO bid       Endocrine  # Type 2 DM   Last HGBA1C 15 (6/2018), taking glargine 50 units with aspart carb count    - Glargine to 35 units  - Increase sliding scale insulin to high intensity  - Diabetes education consult    Heme/Onc  # Normocytic anemia   Likely chronic  - Continue to monitor        DVT Prophylaxis: Heparin gtt  Indwelling lines: Vascular access: PIV , R Triple Lumen PICC   Code Status: Full code      Seen and staffed with Dr. Ortiz.     Roc Cisneros MD  Internal Medicine Resident  Cardiology Service  [phone]       Late entry - pt seen and examined 7/19/18  Critical Care ICU Note - Cardiology  Jenni Ortiz M.D.      The patient remains in the ICU with on-going  need for parenteral medications for the adjustment of blood pressure and cardiac output and maintenance of renal function.        The patient is seen for cardiogenic shock necessitating inotropic agents, vasopressors and afterload reducing agents fluid and diuretic management all to maintain blood pressure and secondary organ function      I personally reviewed:   Hemodynamic parameters obtained by central hemodynamic monitoring including RAP, estimated cardiac output and vascular resistances obtained from PICC line which was shown to correlate with PA catheter readings in order to adjust medications for blood pressure and cardiac output maintenance.  Ventilatory settings and/or supplement oxygen needs in order to obtain optimal oxygenation and electrolyte balance at low possible pressure support and inspired oxygen tension      Pt's hemodynamics transitioned from parenteral agents to oral vasodilatory regimen.  Continue to optimize heart failure regimen.  LVAD work-up initiated with VAD show and tell, SW and Neuropsych consult.  Will plan to transfer out of ICU when bed available.      Jenni Ortiz MD  Section Head - Advanced Heart Failure, Transplantation and Mechanical Circulatory Support  Co-Director - Adult Congenital and Cardiovascular Genetics Center  Associate Professor of Medicine, [facility]      I spent 60 minutes in critical care of the patient including 40 minutes of direct patient care including serial assessments and discussion with the patient and patient's care team.

## 2018-07-19 NOTE — PLAN OF CARE
Problem: Cardiac Disease Comorbidity  Goal: Cardiac Disease  Patient comorbidity will be monitored for signs and symptoms of Cardiac Disease.  Problems will be absent, minimized or managed by discharge/transition of care.   Outcome: Improving  VSS; MAPs 70s - 80s.Titrated to room air sats >92%. Byers removed at 2130; CI >2.0. CVP 8 x2. SV02 56; 58. Transducing off PICC. Nipride off at 2000, and dobutamine straight rate at 2.5. Voiding adequately post-duarte removal yesterday afternoon. Large BM x1; good appetite. Will continue to assess and notify MD of any changes.     Problem: Diabetes Comorbidity  Goal: Diabetes  Patient comorbidity will be monitored for signs and symptoms of hyperglycemia or hypoglycemia. Problems will be absent, minimized or managed by discharge/transition of care.   Outcome: Improving  BGs variable 100s - 200s.

## 2018-07-19 NOTE — PROGRESS NOTES
07/19/18 1500   Quick Adds   Type of Visit Initial Occupational Therapy Evaluation   Living Environment   Lives With friend(s)   Living Arrangements house   Home Accessibility stairs to enter home;stairs within home   Number of Stairs to Enter Home 4   Number of Stairs Within Home 8  (does not have to use)   Stair Railings at Home inside, present at both sides;outside, present at both sides   Transportation Available car;family or friend will provide   Living Environment Comment Pt planning on [place] to ex ina's home where she will be available to care for him intermittently.    Self-Care   Usual Activity Tolerance good   Current Activity Tolerance fair   Regular Exercise yes   Activity/Exercise Type walking   Exercise Amount/Frequency daily   Equipment Currently Used at Home none   Functional Level Prior   Ambulation 0-->independent   Transferring 0-->independent   Toileting 0-->independent   Bathing 0-->independent   Dressing 0-->independent   Eating 0-->independent   Communication 0-->understands/communicates without difficulty   Swallowing 0-->swallows foods/liquids without difficulty   Cognition 0 - no cognition issues reported   Fall history within last six months no   General Information   Referring Physician Roc Cisneros   Patient/Family Goals Statement return to I ADL's and build functional endurance.    Additional Occupational Profile Info/Pertinent History of Current Problem Mr. Mariusz Sharp is a 57-year-old male with medical history significant for recent STEMI (6/27) s/p PCI with ALYSSA to ostial LAD, type 2 DM and CKD stage 3 who was readmitted to [facility] on 7/2 with VT s/p DCCV, the admission was complicated by cardiogenic shock with new LVEF 15% (see more detials below). He was transferred to [facility] on 7/16 for further management.      General Observations pt motivated in therapy and with basic understanding of CR role.    Cognitive Status Examination   Orientation orientation to person,  place and time   Level of Consciousness alert   Able to Follow Commands WNL/WFL   Personal Safety (Cognitive) WNL/WFL   Memory intact   Cognitive Comment no cognitive concerns at this time.    Visual Perception   Visual Perception No deficits were identified   Sensory Examination   Sensory Quick Adds No deficits were identified   Range of Motion (ROM)   ROM Quick Adds No deficits were identified   Strength   Manual Muscle Testing Quick Adds Other   Strength Comments B uE's and LE's grossly 4/5   Hand Strength   Hand Strength Comments mild deficits in B hands.    Coordination   Coordination Comments no deficits.    Transfer Skill: Bed to Chair/Chair to Bed   Level of [place]: Bed to Chair stand-by assist   Transfer Skill: Sit to Stand   Level of [place]: Sit/Stand stand-by assist   Lower Body Dressing   Level of [place]: Dress Lower Body independent  (education required to minimize fatigue. )   Instrumental Activities of Daily Living (IADL)   Previous Responsibilities meal prep;housekeeping;laundry;shopping;yardwork;medication management;finances;driving;work   IADL Comments SO/ex fiance able to assist as needed however intermittently.    Activities of Daily Living Analysis   Impairments Contributing to Impaired Activities of Daily Living strength decreased  (decreased activity tolerance. )   General Therapy Interventions   Planned Therapy Interventions ADL retraining;IADL retraining;bed mobility training;strengthening;transfer training;home program guidelines;progressive activity/exercise;risk factor education   Clinical Impression   Criteria for Skilled Therapeutic Interventions Met yes, treatment indicated   OT Diagnosis decreased ADL I.    Assessment of Occupational Performance 1-3 Performance Deficits   Identified Performance Deficits community mobility, grocery shopping, bathing, dressing.    Clinical Decision Making (Complexity) Low complexity   Therapy Frequency 5 times/wk   Anticipated  "Discharge Disposition Home with Outpatient Therapy   Risks and Benefits of Treatment have been explained. Yes   Patient, Family & other staff in agreement with plan of care Yes   Clinical Impression Comments Pt presents to OT with generla deconditioning and decreased activity tolerance leading to decreased ADL I. pt to benefit from skilled OT intervention to address the above problem list. See daily note for treatment provided today.    [facility] AM-PAC TM \"6 Clicks\"   2016, Trustees of [facility], under license to [employer].  All rights reserved.   6 Clicks Short Forms Daily Activity Inpatient Short Form   [facility] AM-PAC  \"6 Clicks\" Daily Activity Inpatient Short Form   1. Putting on and taking off regular lower body clothing? 4 - None   2. Bathing (including washing, rinsing, drying)? 4 - None   3. Toileting, which includes using toilet, bedpan or urinal? 4 - None   4. Putting on and taking off regular upper body clothing? 4 - None   5. Taking care of personal grooming such as brushing teeth? 4 - None   6. Eating meals? 4 - None   Daily Activity Raw Score (Score out of 24.Lower scores equate to lower levels of function) 24   Total Evaluation Time   Total Evaluation Time (Minutes) 5     "

## 2018-07-20 ENCOUNTER — APPOINTMENT (OUTPATIENT)
Dept: OCCUPATIONAL THERAPY | Facility: CLINIC | Age: 58
DRG: 270 | End: 2018-07-20
Attending: INTERNAL MEDICINE
Payer: COMMERCIAL

## 2018-07-20 PROBLEM — Z79.4 TYPE 2 DIABETES MELLITUS WITH HYPERGLYCEMIA, WITH LONG-TERM CURRENT USE OF INSULIN (H): Status: ACTIVE | Noted: 2018-07-20

## 2018-07-20 PROBLEM — E11.65 TYPE 2 DIABETES MELLITUS WITH HYPERGLYCEMIA, WITH LONG-TERM CURRENT USE OF INSULIN (H): Status: ACTIVE | Noted: 2018-07-20

## 2018-07-20 LAB
ANION GAP SERPL CALCULATED.3IONS-SCNC: 10 MMOL/L (ref 3–14)
BASE EXCESS BLDV CALC-SCNC: 5.5 MMOL/L
BASOPHILS # BLD AUTO: 0 10E9/L (ref 0–0.2)
BASOPHILS NFR BLD AUTO: 0.3 %
BUN SERPL-MCNC: 70 MG/DL (ref 7–30)
CALCIUM SERPL-MCNC: 8.4 MG/DL (ref 8.5–10.1)
CHLORIDE SERPL-SCNC: 100 MMOL/L (ref 94–109)
CO2 SERPL-SCNC: 27 MMOL/L (ref 20–32)
CREAT SERPL-MCNC: 1.85 MG/DL (ref 0.66–1.25)
DIFFERENTIAL METHOD BLD: ABNORMAL
EOSINOPHIL # BLD AUTO: 0.3 10E9/L (ref 0–0.7)
EOSINOPHIL NFR BLD AUTO: 3.8 %
ERYTHROCYTE [DISTWIDTH] IN BLOOD BY AUTOMATED COUNT: 13.7 % (ref 10–15)
GFR SERPL CREATININE-BSD FRML MDRD: 38 ML/MIN/1.7M2
GLUCOSE BLDC GLUCOMTR-MCNC: 109 MG/DL (ref 70–99)
GLUCOSE BLDC GLUCOMTR-MCNC: 164 MG/DL (ref 70–99)
GLUCOSE BLDC GLUCOMTR-MCNC: 203 MG/DL (ref 70–99)
GLUCOSE BLDC GLUCOMTR-MCNC: 247 MG/DL (ref 70–99)
GLUCOSE SERPL-MCNC: 124 MG/DL (ref 70–99)
HCO3 BLDV-SCNC: 31 MMOL/L (ref 21–28)
HCT VFR BLD AUTO: 31.7 % (ref 40–53)
HGB BLD-MCNC: 10 G/DL (ref 13.3–17.7)
HGB BLD-MCNC: 10.3 G/DL (ref 13.3–17.7)
IMM GRANULOCYTES # BLD: 0 10E9/L (ref 0–0.4)
IMM GRANULOCYTES NFR BLD: 0.1 %
INR PPP: 1.35 (ref 0.86–1.14)
LMWH PPP CHRO-ACNC: 1.84 IU/ML
LMWH PPP CHRO-ACNC: >2 IU/ML
LMWH PPP CHRO-ACNC: NORMAL IU/ML
LYMPHOCYTES # BLD AUTO: 2.4 10E9/L (ref 0.8–5.3)
LYMPHOCYTES NFR BLD AUTO: 26.7 %
MAGNESIUM SERPL-MCNC: 1.9 MG/DL (ref 1.6–2.3)
MCH RBC QN AUTO: 28.7 PG (ref 26.5–33)
MCHC RBC AUTO-ENTMCNC: 31.5 G/DL (ref 31.5–36.5)
MCV RBC AUTO: 91 FL (ref 78–100)
MONOCYTES # BLD AUTO: 0.5 10E9/L (ref 0–1.3)
MONOCYTES NFR BLD AUTO: 5.3 %
NEUTROPHILS # BLD AUTO: 5.8 10E9/L (ref 1.6–8.3)
NEUTROPHILS NFR BLD AUTO: 63.8 %
NRBC # BLD AUTO: 0 10*3/UL
NRBC BLD AUTO-RTO: 0 /100
O2/TOTAL GAS SETTING VFR VENT: ABNORMAL %
OXYHGB MFR BLDV: 49 %
PCO2 BLDV: 48 MM HG (ref 40–50)
PH BLDV: 7.42 PH (ref 7.32–7.43)
PLATELET # BLD AUTO: 210 10E9/L (ref 150–450)
PO2 BLDV: 28 MM HG (ref 25–47)
POTASSIUM SERPL-SCNC: 3.7 MMOL/L (ref 3.4–5.3)
RBC # BLD AUTO: 3.48 10E12/L (ref 4.4–5.9)
SODIUM SERPL-SCNC: 137 MMOL/L (ref 133–144)
WBC # BLD AUTO: 9.1 10E9/L (ref 4–11)

## 2018-07-20 PROCEDURE — 25000132 ZZH RX MED GY IP 250 OP 250 PS 637: Performed by: INTERNAL MEDICINE

## 2018-07-20 PROCEDURE — 25000128 H RX IP 250 OP 636: Performed by: INTERNAL MEDICINE

## 2018-07-20 PROCEDURE — 25000132 ZZH RX MED GY IP 250 OP 250 PS 637: Performed by: STUDENT IN AN ORGANIZED HEALTH CARE EDUCATION/TRAINING PROGRAM

## 2018-07-20 PROCEDURE — 85610 PROTHROMBIN TIME: CPT | Performed by: INTERNAL MEDICINE

## 2018-07-20 PROCEDURE — 80048 BASIC METABOLIC PNL TOTAL CA: CPT | Performed by: INTERNAL MEDICINE

## 2018-07-20 PROCEDURE — 25000125 ZZHC RX 250: Performed by: INTERNAL MEDICINE

## 2018-07-20 PROCEDURE — 85025 COMPLETE CBC W/AUTO DIFF WBC: CPT | Performed by: INTERNAL MEDICINE

## 2018-07-20 PROCEDURE — 85520 HEPARIN ASSAY: CPT | Performed by: INTERNAL MEDICINE

## 2018-07-20 PROCEDURE — 85018 HEMOGLOBIN: CPT | Performed by: INTERNAL MEDICINE

## 2018-07-20 PROCEDURE — 40000275 ZZH STATISTIC RCP TIME EA 10 MIN

## 2018-07-20 PROCEDURE — 97535 SELF CARE MNGMENT TRAINING: CPT | Mod: GO

## 2018-07-20 PROCEDURE — 94640 AIRWAY INHALATION TREATMENT: CPT

## 2018-07-20 PROCEDURE — 99233 SBSQ HOSP IP/OBS HIGH 50: CPT | Mod: GC | Performed by: INTERNAL MEDICINE

## 2018-07-20 PROCEDURE — 40000133 ZZH STATISTIC OT WARD VISIT

## 2018-07-20 PROCEDURE — 94640 AIRWAY INHALATION TREATMENT: CPT | Mod: 76

## 2018-07-20 PROCEDURE — 83735 ASSAY OF MAGNESIUM: CPT | Performed by: INTERNAL MEDICINE

## 2018-07-20 PROCEDURE — 00000146 ZZHCL STATISTIC GLUCOSE BY METER IP

## 2018-07-20 PROCEDURE — 82805 BLOOD GASES W/O2 SATURATION: CPT | Performed by: INTERNAL MEDICINE

## 2018-07-20 PROCEDURE — 21400003 ZZH R&B CCU CRITICAL UMMC

## 2018-07-20 RX ORDER — POTASSIUM CHLORIDE 7.45 MG/ML
10 INJECTION INTRAVENOUS
Status: DISCONTINUED | OUTPATIENT
Start: 2018-07-20 | End: 2018-07-24 | Stop reason: HOSPADM

## 2018-07-20 RX ORDER — POTASSIUM CHLORIDE 1.5 G/1.58G
20-40 POWDER, FOR SOLUTION ORAL
Status: DISCONTINUED | OUTPATIENT
Start: 2018-07-20 | End: 2018-07-24 | Stop reason: HOSPADM

## 2018-07-20 RX ORDER — POTASSIUM CL/LIDO/0.9 % NACL 10MEQ/0.1L
10 INTRAVENOUS SOLUTION, PIGGYBACK (ML) INTRAVENOUS
Status: DISCONTINUED | OUTPATIENT
Start: 2018-07-20 | End: 2018-07-24 | Stop reason: HOSPADM

## 2018-07-20 RX ORDER — HYDRALAZINE HYDROCHLORIDE 50 MG/1
50 TABLET, FILM COATED ORAL 4 TIMES DAILY
Status: DISCONTINUED | OUTPATIENT
Start: 2018-07-20 | End: 2018-07-20

## 2018-07-20 RX ORDER — MAGNESIUM SULFATE HEPTAHYDRATE 40 MG/ML
2 INJECTION, SOLUTION INTRAVENOUS DAILY PRN
Status: DISCONTINUED | OUTPATIENT
Start: 2018-07-20 | End: 2018-07-24 | Stop reason: HOSPADM

## 2018-07-20 RX ORDER — WARFARIN SODIUM 5 MG/1
5 TABLET ORAL
Status: COMPLETED | OUTPATIENT
Start: 2018-07-20 | End: 2018-07-20

## 2018-07-20 RX ORDER — POTASSIUM CHLORIDE 750 MG/1
20-40 TABLET, EXTENDED RELEASE ORAL
Status: DISCONTINUED | OUTPATIENT
Start: 2018-07-20 | End: 2018-07-24 | Stop reason: HOSPADM

## 2018-07-20 RX ORDER — MAGNESIUM SULFATE HEPTAHYDRATE 40 MG/ML
4 INJECTION, SOLUTION INTRAVENOUS EVERY 4 HOURS PRN
Status: DISCONTINUED | OUTPATIENT
Start: 2018-07-20 | End: 2018-07-24 | Stop reason: HOSPADM

## 2018-07-20 RX ORDER — HYDRALAZINE HYDROCHLORIDE 50 MG/1
50 TABLET, FILM COATED ORAL EVERY 6 HOURS
Status: DISCONTINUED | OUTPATIENT
Start: 2018-07-20 | End: 2018-07-21

## 2018-07-20 RX ORDER — POTASSIUM CHLORIDE 29.8 MG/ML
20 INJECTION INTRAVENOUS
Status: DISCONTINUED | OUTPATIENT
Start: 2018-07-20 | End: 2018-07-24 | Stop reason: HOSPADM

## 2018-07-20 RX ADMIN — DOXYCYCLINE HYCLATE 100 MG: 100 CAPSULE ORAL at 19:06

## 2018-07-20 RX ADMIN — CEFDINIR 300 MG: 300 CAPSULE ORAL at 07:58

## 2018-07-20 RX ADMIN — ASPIRIN 81 MG CHEWABLE TABLET 81 MG: 81 TABLET CHEWABLE at 07:55

## 2018-07-20 RX ADMIN — FAMOTIDINE 20 MG: 20 TABLET ORAL at 07:55

## 2018-07-20 RX ADMIN — IPRATROPIUM BROMIDE AND ALBUTEROL SULFATE 3 ML: .5; 3 SOLUTION RESPIRATORY (INHALATION) at 13:23

## 2018-07-20 RX ADMIN — HEPARIN SODIUM 1400 UNITS/HR: 10000 INJECTION, SOLUTION INTRAVENOUS at 01:03

## 2018-07-20 RX ADMIN — IPRATROPIUM BROMIDE AND ALBUTEROL SULFATE 3 ML: .5; 3 SOLUTION RESPIRATORY (INHALATION) at 09:09

## 2018-07-20 RX ADMIN — HEPARIN SODIUM 1050 UNITS/HR: 10000 INJECTION, SOLUTION INTRAVENOUS at 21:47

## 2018-07-20 RX ADMIN — DIGOXIN 125 MCG: 125 TABLET ORAL at 07:56

## 2018-07-20 RX ADMIN — Medication 37.5 MG: at 01:03

## 2018-07-20 RX ADMIN — CLOPIDOGREL 75 MG: 75 TABLET, FILM COATED ORAL at 07:55

## 2018-07-20 RX ADMIN — AMIODARONE HYDROCHLORIDE 400 MG: 200 TABLET ORAL at 07:54

## 2018-07-20 RX ADMIN — ISOSORBIDE DINITRATE 10 MG: 10 TABLET ORAL at 13:06

## 2018-07-20 RX ADMIN — ISOSORBIDE DINITRATE 10 MG: 10 TABLET ORAL at 18:09

## 2018-07-20 RX ADMIN — ISOSORBIDE DINITRATE 10 MG: 10 TABLET ORAL at 09:38

## 2018-07-20 RX ADMIN — CEFDINIR 300 MG: 300 CAPSULE ORAL at 19:06

## 2018-07-20 RX ADMIN — GUAIFENESIN AND DEXTROMETHORPHAN 5 ML: 100; 10 SYRUP ORAL at 22:16

## 2018-07-20 RX ADMIN — IPRATROPIUM BROMIDE AND ALBUTEROL SULFATE 3 ML: .5; 3 SOLUTION RESPIRATORY (INHALATION) at 20:19

## 2018-07-20 RX ADMIN — ROSUVASTATIN CALCIUM 20 MG: 20 TABLET, FILM COATED ORAL at 21:27

## 2018-07-20 RX ADMIN — HYDRALAZINE HYDROCHLORIDE 50 MG: 50 TABLET ORAL at 19:06

## 2018-07-20 RX ADMIN — DOXYCYCLINE HYCLATE 100 MG: 100 CAPSULE ORAL at 07:55

## 2018-07-20 RX ADMIN — HYDRALAZINE HYDROCHLORIDE 50 MG: 50 TABLET ORAL at 14:05

## 2018-07-20 RX ADMIN — WARFARIN SODIUM 5 MG: 5 TABLET ORAL at 18:09

## 2018-07-20 RX ADMIN — HYDRALAZINE HYDROCHLORIDE 50 MG: 50 TABLET ORAL at 07:55

## 2018-07-20 ASSESSMENT — ACTIVITIES OF DAILY LIVING (ADL)
ADLS_ACUITY_SCORE: 9

## 2018-07-20 NOTE — PROGRESS NOTES
LVAD/Transplant Social Work Services Progress Note      Date of Initial Social Work Evaluation: 7/17/2018  Collaborated with: n/a    Data: am covering for Christine Rainey, LVAD  today.  Consult received for psychosocial assessment as part of LVAD evaluation.  Christine Rainey completed this evaluation in anticipation of the order.  See her note dated 7/17.    Intervention: n/a  Assessment: see assessment  Plan:  Redd will follow with any concerns when she returns to the office next week.      complains of pain/discomfort

## 2018-07-20 NOTE — PROGRESS NOTES
"CARDIOLOGY PROGRESS NOTE    SUBJECTIVE:  Overnight no acute events. CVPs uptrending to the 6-7 range. Feeling well. This morning had some bleeding from his prior sheath site later in the morning.     ROS otherwise negative.    OBJECTIVE:  Vital signs:  Temp: 97.8  F (36.6  C) Temp src: Oral BP: 90/65 Pulse: 77 Heart Rate: 74 Resp: 18 SpO2: 95 % O2 Device: None (Room air) Oxygen Delivery: 1.5 LPM Height: 162.6 cm (5' 4\") Weight: 77 kg (169 lb 12.1 oz)  Estimated body mass index is 29.14 kg/(m^2) as calculated from the following:    Height as of this encounter: 1.626 m (5' 4\").    Weight as of this encounter: 77 kg (169 lb 12.1 oz).    Vitals:    07/17/18 0400 07/18/18 0500 07/19/18 0630   Weight: 78.4 kg (172 lb 13.5 oz) 78.1 kg (172 lb 2.9 oz) 77 kg (169 lb 12.1 oz)       Intake/Output Summary (Last 24 hours) at 07/20/18 1732  Last data filed at 07/20/18 1500   Gross per 24 hour   Intake          1758.18 ml   Output             1200 ml   Net           558.18 ml     CVP of 8 this AM from PICC line      PHYSICAL EXAM:  General: AAO x 3,  no clubbing/cyanosis, no edema, JVP flat  HEENT:  PERRL, MMM with out pharyngeal erythema.   Cardiac: RRR. No m/r/g. Normal S1, S2. DP2+ bilaterally  Pulm: CTAB, no wheezes, no crackles.  Abd: +BS, soft, non distended, non tender. No hepatosplenomegaly.  Skin: No rash  MSK: No deformities, no joint swelling, warm and well perfused    Neuro: CN grossly intact, no focal deficits  Psych: Appropriate mood, full affect    Infusions:  Heparin 650 units/hr    ASSESSMENT/PLAN:  Mr. Mariusz Sharp is a 57-year-old male with medical history significant for recent STEMI (6/27) s/p PCI with ALYSSA to ostial LAD, type 2 DM and CKD stage 3 who was readmitted to [facility] on 7/2 with VT s/p DCCV, the admission was complicated by cardiogenic shock with new LVEF 15% (see more detials below). He was transferred to [facility] on 7/16 for further management.      CHANGES TODAY  - Increase hydralazine 50mg " QID;   - Transfer to floor  - Start carb coverage insulin 1:35 - appreciate Diabetes Management team recs   - Hold diuresis   - Neuropsych to see pt Monday   - ANDREW stable       Neurology  No active issues, no sedation needed.      Respiratory  # Acute Bronchitis    From care everywhere, patient developed leukocytosis and elevated pro-calcitonin, no infiltration on CXR. Ceftriaxone and doxycycline was started on 7/16 for bronchitis. Now on cefdinir and doxycyline. No leukocytosis on admission. No respiratory symptoms.      - Finish out 5 day course of doxycycline and cefdinir on 6/21/18  - Duoneb QID        Cardiovascular  # Cardiogenic shock   # HFrEF, ICM, LVEF 15%    ACC stage C, NYHA III, TTE (7/5) showed LVEF 15%, with multiple wall motion abnormalities, likely ischemic. Per care everywhere, patient developed cardiogenic shock on 7/11, although initial [place] numbers not available. [place] and IABP were placed. He received milrinone gtt and diuresis with furosemide gtt, prn chlorothiazide and metolazone. At [facility], CVP 12, PCWP 24, CI 2.2 and lactate 1.1 (on milrinone gtt 0.25 en route). He was well perfused from exam. Patient switched from Milrinone to Dobutamine on arrival with CI holding 1.8-2.0. Patient diuresed well with CVP falling to 5 and PCWP 14.     - Telemetry, strict I&O, weight daily  - Diuresis: Hold diuresis today for PCWP and CVP at target.  - Inotrope: Off of dobutamine   - ACEI/ARB: hold off for now due to ANDREW   - Beta blocker: hold off for now due to cardiogenic shock   - S/p IABP removed.   - LVAD work-up initiated with VAD show and tell, SW and Neuropsych consult.  - Isordil 10mg TID and Hydral 50 QID   - Follow MVO2   - Cardiac Rehab consult placed       # Recent STEMI s/p PCI with ALYSSA to ostial LAD (6/27)   Troponin on admission 1.46, no chest pain    - Continue Plavix 75 mg daily, Start ASA 81mg qday  - Heparin GTT   - Start Crestor 7/19/18      # VT s/p ICD  Patient developed VT on 7/2 s/p ICD  placement, S/p ICD shocked x 2 on 7/16 prior to transfer.  - EP following appreciate recs  - Start Amiodarone 400 mg daily     # Atrial flutter   # LV thrombus    Patient developed atrial flutter on 7/2. TTE on 7/5 showed LV clot. [id number] 2 (heart failure and diabetes). EKG on arrival showed NSR.    - High intensity heparin gtt  - Start warfarin tonight      Renal  # Non-oliguric ANDREW on CKD stage III   Baseline creatinine around 1-1.2 (7/2), creatinines from 7/5 to 7/16 were persistently > 3. Cr on admission at [facility] is 2.79.      - Continue to monitor   - Avoid nephrotoxic medications  - Maintain hemodynamic      - Hold diuretics for rest of today      Infectious disease  # Bronchitis   - Ceftriaxone 2 g IV (7/14)  - Cefdinir 300 mg bid (7/16-present), Day 3/5  - Doxycycline 100 mg bid (7/16-present), Day 3/5      Gastrointestinal  No active issues       # Nutrition  Consistent carb with 2 g sodium and 1.5 L fluid restriction       # Stress ulcer Prophylaxis: Famotidine 20 mg PO bid       Endocrine  # Type 2 DM   Last HGBA1C 15 (6/2018), taking glargine 50 units with aspart carb count    - Glargine to 35 units  - Increase sliding scale insulin to high intensity  - Diabetes education consult    Heme/Onc  # Normocytic anemia   Likely chronic  - Continue to monitor        DVT Prophylaxis: Heparin gtt  Indwelling lines: Vascular access: PIV , R Triple Lumen PICC   Code Status: Full code      Seen and staffed with Dr. Ortiz.     Roc Cisneros MD  Internal Medicine Resident  Cardiology Service  [phone]       I have reviewed today's vital signs, notes, medications, labs and imaging. I have also seen and examined the patient and agree with the findings and plan as outlined above.    Jenni Ortiz MD  Section Head - Advanced Heart Failure, Transplantation and Mechanical Circulatory Support  Co-Director - Adult Congenital and Cardiovascular Genetics Center  Associate Professor of Medicine, [employer]   [place]

## 2018-07-20 NOTE — PROGRESS NOTES
This writer met with patient for a brief VAD show and tell.  Showed patient the Heartmate 2, Heartmate 3 and Heartware LVADs.  Briefly discussed the surgery, lifestyle changes including no swimming or contact sports, education involved for him and his caregivers, driveline dressing changes,and potential complications.  Answered all of patient's questions. Plan to do the official show and tell with patient and his caregivers, Lisa Walters and Ryle, on Monday around noon.

## 2018-07-20 NOTE — CONSULTS
CM Brief Note:    SW was consulted per Pt's request regarding FMLA paperwork. Pt states he had his HR fax paperwork to the unit yesterday. Writer attempted to locate the paperwork, but it could not be found. Writer advised Pt who will request his HR to re-fax it. SW will remain available as needed.      Gay Lewis [facility]  ICU   Pager: [phone]

## 2018-07-20 NOTE — PROGRESS NOTES
Pt pleasant and call light approp. Neuro exam WDL. VSS. SR with R BBB. BP stable. RA this AM. Lungs diminished. BM this AM. Voiding without difficulty. See flowsheets for insulin and medication administration.     Around 1000 pt yelled for writer. R groin has large amount of bleeding present. Manual pressure held x 15 minutes with quickclot drsg. CMS checks WDL. Heparin infusing as ordered. Providers notified. No further bleeding to time.     Report given to 6C nurse. Floor nurse updated on Hep 10a level while patient in route upstairs. Floor nurse to follow Heparin protocol and change dose per order.

## 2018-07-20 NOTE — CONSULTS
"Diabetes/Hyperglycemia Management Consult    Chief Complaint type 2 diabetes, uncontrolled  Consult requested by: Dr. Roc Cisneros, attending: Dr. Jenni Ortiz  History of Present Illness Mr. Mariusz Sharp is a 58 yo man with a history of uncontrolled type 2 diabetes, recent STEMI on 6/27/18 s/p PCI, and CKD stage 3, who was readmitted on 7/2 to [facility] in [place] with VT and developed cardiogenic shock, transferred to [facility] on 7/16/18 for further management of heart failure.  Our inpatient diabetes team was asked to see pt to help determine a \"reasonable management plan\" for his diabetes for discharge.  Aiming for discharge early next week.    Mariusz was diagnosed with type 2 diabetes about 2 years ago after there was an incidental finding of hyperglycemia when he presented to the ED with a kidney stone.  Per patient, he declined starting metformin at that time because he wanted to try working on diet and exercise instead.  Later he was given a prescription for glipizide, but reports that he never took this.  On admission 7/16 his hemoglobin A1c was 12.3%.  Prior to admission for his STEMI he had been experiencing polydipsia and polyuria for months.  He had not been checking his blood sugars at home.  When he was discharged from the admission for his STEMI he was instructed to take glargine 50 units nightly.  Patient said that he did take this medication once, but then was readmitted to the hospital.  Mariusz helps take care of his father, who is a type 2 diabetic, including administering his insulin and GLP-1 agonist.    At the start of this admission Mariusz's blood sugar was in the 200s-300s.  He was started on correction aspart and glargine 20 units nightly.  His glucoses did not improve much so his glargine dose was increased to 40 units, which brought his fasting blood sugar down to the low 100s.  Daytime glucoses, however, continue to rise to the 300s by the evening.  Last night his glargine dose was " decreased to 35 units and his glucose this morning was still in the low 100s.  Today, pt's primary team started a low dose of meal aspart: 1unit/35g CHO.    Mariusz reports feeling much better: His shortness of breath is improved, the swelling in his legs is much less.  He reports having a good appetite, but says that this is actually less than his baseline.    Recent Labs  Lab 07/20/18  0801 07/20/18  0403 07/19/18  2208 07/19/18  1602 07/19/18  0743 07/19/18  0414 07/18/18  2156 07/18/18  1610  07/18/18  0350  07/17/18  2237  07/17/18  0826  07/17/18  0410   GLC  --  124*  --   --   --  157*  --   --   --  238*  --  336*  --  242*  --  249*   *  --  308* 230* 107*  --  288* 267*  < >  --   < >  --   < >  --   < >  --    < > = values in this interval not displayed.      Diabetes Type:   Type 2 Diabetes  Diabetes Duration: diagnosed 2 years ago, but glucoses may have been elevated for a while prior to this.  Usual Diabetes Regimen:   Prior to STEMI on 6/26- no medications, was monitoring his carb intake a little.  Upon discharge from STEMI admission: glargine 50 units   Ability to [place] Prescribed Regimen: Pt has been reluctant to take medication for diabetes in the past, but now seems more willing.  He has helped his father with administration of insulin and GLP-1 agonist, so is comfortable administering injections.  Diabetes Control:   Lab Results   Component Value Date    A1C 11.9 07/17/2018    A1C 12.3 07/16/2018     Diabetes Complications: none per pt- - he reports that renal impairment started with his MI.  He is UTD on eye exam.  History of DKA: none  Able to Detect Hypoglycemia: Yes- one episode in past when BG was 56 (while admitted to hospital in [place])- he was symptomatic.  Usual Diabetes Care Provider: Previously was sporadically seeing PCP, Dr. Henderson, who has since retired.  Last outpatient visit to address diabetes was >1.5 years ago.  Factors Impacting Glucose Control: Lack of prandial  "insulin leading to hyperglycemia by the end of the day, current dose of glargine may be excessive if HS glucoses are better controlled.      Review of Systems  10 point ROS completed with pertinent positives and negatives noted in the HPI    Past medical, family and social histories are reviewed and updated.    Past Medical History  Type 2 diabetes    Family History  Type 2 diabetes- mother and father    Social History  Social History     Social History     Marital status: Single     Spouse name: N/A     Number of children: N/A     Years of education: N/A     Social History Main Topics     Smoking status: Not on file     Smokeless tobacco: Not on file     Alcohol use Not on file     Drug use: Not on file     Sexual activity: Not on file     Other Topics Concern     Not on file     Social History Narrative   Mariusz is  and also has an ex-fiance that he remains close with.  He has 2 sons from his previous marriage (in their 30s) and one teenage son with his ex-fiance. Previously worked as a  in [place]. He plans to stay with his ex-fiance after discharge (she is an RN).      Physical Exam  BP 90/65  Pulse 77  Temp 97.8  F (36.6  C) (Oral)  Resp 18  Ht 1.626 m (5' 4\")  Wt 77 kg (169 lb 12.1 oz)  SpO2 95%  BMI 29.14 kg/m2    General:  pleasant man, resting in chair (standing up occasionally), in no distress.   HEENT: NC/AT, PER and anicteric, non-injected, oral mucous membranes moist.   Lungs: unlabored respiration, no cough  Skin: warm and dry, no obvious lesions  MSK:  fluid movement of all extremities  Lymp: trace bilateral LE edema   Mental status:  alert, oriented x3, communicating clearly  Psych:  calm, even mood    Laboratory  Recent Labs   Lab Test  07/20/18   0403  07/19/18   0414   NA  137  138   POTASSIUM  3.7  3.6   CHLORIDE  100  98   CO2  27  31   ANIONGAP  10  9   GLC  124*  157*   BUN  70*  72*   CR  1.85*  1.83*   ARLENE  8.4*  8.8     CBC RESULTS:   Recent Labs   Lab Test  07/20/18   " 1059  07/20/18   0403   WBC   --   9.1   RBC   --   3.48*   HGB  10.3*  10.0*   HCT   --   31.7*   MCV   --   91   MCH   --   28.7   MCHC   --   31.5   RDW   --   13.7   PLT   --   210       Liver Function Studies -   Recent Labs   Lab Test  07/19/18   0414   PROTTOTAL  7.1   ALBUMIN  2.6*   BILITOTAL  1.0   ALKPHOS  121   AST  39   ALT  88*       Active Medications  Current Facility-Administered Medications   Medication     acetaminophen (TYLENOL) tablet 650 mg     amiodarone (PACERONE/CODARONE) tablet 400 mg     aspirin chewable tablet 81 mg     cefdinir (OMNICEF) capsule 300 mg     clopidogrel (PLAVIX) tablet 75 mg     glucose gel 15-30 g    Or     dextrose 50 % injection 25-50 mL    Or     glucagon injection 1 mg     digoxin (LANOXIN) tablet 125 mcg     doxycycline (VIBRAMYCIN) capsule 100 mg     famotidine (PEPCID) tablet 20 mg     guaiFENesin-dextromethorphan (ROBITUSSIN DM) 100-10 MG/5ML syrup 5 mL     heparin  drip 25,000 units in 0.45% NaCl 250 mL (see additional administration details for dose)     heparin bolus from infusion pump     hydrALAZINE (APRESOLINE) tablet 50 mg     insulin aspart (NovoLOG) inj (RAPID ACTING)     insulin aspart (NovoLOG) inj (RAPID ACTING)     insulin aspart (NovoLOG) inj (RAPID ACTING)     insulin aspart (NovoLOG) inj (RAPID ACTING)     insulin glargine (LANTUS) injection 25 Units     ipratropium - albuterol 0.5 mg/2.5 mg/3 mL (DUONEB) neb solution 3 mL     isosorbide dinitrate (ISORDIL) tablet 10 mg     lidocaine (LMX4) cream     lidocaine 1 % 1 mL     magnesium sulfate 2 g in water intermittent infusion     magnesium sulfate 4 g in 100 mL sterile water (premade)     medication instruction     Patient is already receiving anticoagulation with heparin, enoxaparin (LOVENOX), warfarin (COUMADIN)  or other anticoagulant medication     potassium chloride (KLOR-CON) Packet 20-40 mEq     potassium chloride 10 mEq in 100 mL intermittent infusion with 10 mg lidocaine     potassium  chloride 10 mEq in 100 mL sterile water intermittent infusion (premix)     potassium chloride 20 mEq in 50 mL intermittent infusion     potassium chloride SA (K-DUR/KLOR-CON M) CR tablet 20-40 mEq     rosuvastatin (CRESTOR) tablet 20 mg     sodium chloride (PF) 0.9% PF flush 3 mL     sodium chloride (PF) 0.9% PF flush 3 mL     Warfarin Therapy Reminder (Check START DATE - warfarin may be starting in the FUTURE)     No current outpatient prescriptions on file.       Current Diet    Active Diet Order      Combination Diet 5221-8915 Calories: Moderate Consistent CHO (4-6 CHO units/meal); 2 gm NA Diet      Assessment  Mr. Mariusz Sharp is a 56 yo man with a history of uncontrolled type 2 diabetes, recent STEMI on 6/27/18 s/p PCI, and CKD stage 3, who was readmitted on 7/2 to [facility] in [place] with VT and developed cardiogenic shock, transferred to [facility] on 7/16/18 for further management of heart failure.      Glucose pattern this admission has included lower morning glucoses and then hyperglycemia developing throughout the day (to the 300s), which reflect lack of prandial coverage.        Plan  While admitted we can increase prandial insulin coverage and decrease basal insulin in effort to avoid hypo- and hyper-glycemia.  Outpatient: pt would be an excellent candidate for a GLP-1 agonist (specifically liraglutide) [LEADER trial: pts on liraglutide had lower risks (compared to placebo) of nonfatal MI, nonfatal stroke, and death from cardiovascular causes in pts with DM2 and high cardiovascular risk].  Currently, discharge pharmacy does not have pt's insurance info, so unable to determine if pt has coverage for this medication.    -glargine dose decreased from 35 to 25 units qPM starting this evening.  -aspart for meals and snacks increased to 1unit/10g CHO  -aspart for correction: continue high intensity ac and hs  -monitor glucose ac, hs and 0200  -Primary team will submit insurance information to discharge pharmacy (per  Dr. Cisneros), we will try to run test claim for liraglutuide tomorrow and start this medication at that time if pt has coverage.    Ideally, by discharge liraglutide could replace the need for aspart insulin and pt could be on liraglutide and glargine only.    Recommend pt establish with a new PCP and follow up on DM within 1-2 weeks of discharge.    We will continue to follow.    Melody Gutierrez PA-C [phone]    Diabetes Management Team job code: [id number]    I spent a total of 80 minutes bedside and on the inpatient unit managing the glycemic care of Mariusz Sharp. Over 50% of my time on the unit was spent counseling the patient and/or coordinating care regarding insulin adjustments in context of daytime hyperglycemia, and discussing potential discharge DM plan pt, primary team and discharge pharmacy.  See note for details.

## 2018-07-20 NOTE — PLAN OF CARE
Problem: Cardiac Disease Comorbidity  Goal: Cardiac Disease  Patient comorbidity will be monitored for signs and symptoms of Cardiac Disease.  Problems will be absent, minimized or managed by discharge/transition of care.   Outcome: Improving  VSS. Room air - 1.5L NC at night. Dry cough overnight; robitussin given without relief. SV02 49; CVP 5-7. Abiding by FR; good appetite. Heparin gtt supratherapeutic this AM. BGs elevated 200s - 300s. Voids adequately. Will continue to assess and notify MD of any changes.

## 2018-07-20 NOTE — PLAN OF CARE
Problem: Patient Care Overview  Goal: Plan of Care/Patient Progress Review  Outcome: Improving  Date of Admission: 7/16/18    Interval Events:    -Dobutamine stopped  -Isordil adjusted (dizzy and hypotensive with scheduled doses)  -Heparin gtt started  -ASA and coumadin added  -Digoxin added    D/I:  Neuro: WDL; denies pain  CV: RBBB; SR; Hypotensive with Isordil  Pulm:RA-2L NC  GI: carb consistent diet with 2L FW restriction  :Adequate volumes; spontaneously  Endo:Hyperglycemia; lantus increased; continues with SSI  Heme:Heparin gtt therapeutic; ASA/Plavix/Coumadin  ID:Afebrile  Lines:Double lumen PICC; PIV  Gtts:Heparin  Skin:WFL  Social:Family supportive, at bedside    A:Stable  P: Transfer to floor soon    Please see flow sheets for further information. Thank you.

## 2018-07-20 NOTE — PLAN OF CARE
Problem: Patient Care Overview  Goal: Plan of Care/Patient Progress Review  Discharge Planner OT   Patient plan for discharge: home with assist   Current status: Pt issued fatigue management modules and educated regarding contents including: the basics of fatigue, communication and fatigue, body mechanics, analyzing and modifying activities, and living a balanced lifestyle.  Pt and therapist discussed post surgical precautions with LVAD and their affect on ADLs/IADLs.  Pt verbalized understanding of all information presented.   Plan was to walk pt, but upon return pt needing nursing cares.    Barriers to return to prior living situation: medical status  Recommendations for discharge: home with assist  Rationale for recommendations: Pt near baseline.       Entered by: Marjan Gutierrez 07/20/2018 4:06 PM

## 2018-07-21 LAB
ANION GAP SERPL CALCULATED.3IONS-SCNC: 9 MMOL/L (ref 3–14)
BASOPHILS # BLD AUTO: 0.1 10E9/L (ref 0–0.2)
BASOPHILS NFR BLD AUTO: 0.9 %
BUN SERPL-MCNC: 59 MG/DL (ref 7–30)
CALCIUM SERPL-MCNC: 8.7 MG/DL (ref 8.5–10.1)
CHLORIDE SERPL-SCNC: 104 MMOL/L (ref 94–109)
CO2 SERPL-SCNC: 26 MMOL/L (ref 20–32)
CREAT SERPL-MCNC: 1.83 MG/DL (ref 0.66–1.25)
DIFFERENTIAL METHOD BLD: ABNORMAL
EOSINOPHIL # BLD AUTO: 0.3 10E9/L (ref 0–0.7)
EOSINOPHIL NFR BLD AUTO: 4.3 %
ERYTHROCYTE [DISTWIDTH] IN BLOOD BY AUTOMATED COUNT: 14 % (ref 10–15)
GFR SERPL CREATININE-BSD FRML MDRD: 39 ML/MIN/1.7M2
GLUCOSE BLDC GLUCOMTR-MCNC: 134 MG/DL (ref 70–99)
GLUCOSE BLDC GLUCOMTR-MCNC: 150 MG/DL (ref 70–99)
GLUCOSE BLDC GLUCOMTR-MCNC: 156 MG/DL (ref 70–99)
GLUCOSE BLDC GLUCOMTR-MCNC: 254 MG/DL (ref 70–99)
GLUCOSE SERPL-MCNC: 125 MG/DL (ref 70–99)
HCT VFR BLD AUTO: 32.1 % (ref 40–53)
HGB BLD-MCNC: 9.9 G/DL (ref 13.3–17.7)
IMM GRANULOCYTES # BLD: 0 10E9/L (ref 0–0.4)
IMM GRANULOCYTES NFR BLD: 0.4 %
INR PPP: 1.23 (ref 0.86–1.14)
LMWH PPP CHRO-ACNC: 0.63 IU/ML
LMWH PPP CHRO-ACNC: 0.69 IU/ML
LYMPHOCYTES # BLD AUTO: 2.1 10E9/L (ref 0.8–5.3)
LYMPHOCYTES NFR BLD AUTO: 25.7 %
MAGNESIUM SERPL-MCNC: 2.2 MG/DL (ref 1.6–2.3)
MCH RBC QN AUTO: 28.5 PG (ref 26.5–33)
MCHC RBC AUTO-ENTMCNC: 30.8 G/DL (ref 31.5–36.5)
MCV RBC AUTO: 93 FL (ref 78–100)
MONOCYTES # BLD AUTO: 0.5 10E9/L (ref 0–1.3)
MONOCYTES NFR BLD AUTO: 5.9 %
NEUTROPHILS # BLD AUTO: 5 10E9/L (ref 1.6–8.3)
NEUTROPHILS NFR BLD AUTO: 62.8 %
NRBC # BLD AUTO: 0 10*3/UL
NRBC BLD AUTO-RTO: 0 /100
PLATELET # BLD AUTO: 205 10E9/L (ref 150–450)
POTASSIUM SERPL-SCNC: 3.8 MMOL/L (ref 3.4–5.3)
RBC # BLD AUTO: 3.47 10E12/L (ref 4.4–5.9)
SODIUM SERPL-SCNC: 140 MMOL/L (ref 133–144)
WBC # BLD AUTO: 8 10E9/L (ref 4–11)

## 2018-07-21 PROCEDURE — 21400003 ZZH R&B CCU CRITICAL UMMC

## 2018-07-21 PROCEDURE — 40000275 ZZH STATISTIC RCP TIME EA 10 MIN

## 2018-07-21 PROCEDURE — 25000125 ZZHC RX 250: Performed by: INTERNAL MEDICINE

## 2018-07-21 PROCEDURE — 25000132 ZZH RX MED GY IP 250 OP 250 PS 637: Performed by: STUDENT IN AN ORGANIZED HEALTH CARE EDUCATION/TRAINING PROGRAM

## 2018-07-21 PROCEDURE — 25000132 ZZH RX MED GY IP 250 OP 250 PS 637: Performed by: INTERNAL MEDICINE

## 2018-07-21 PROCEDURE — 85520 HEPARIN ASSAY: CPT | Performed by: PHYSICIAN ASSISTANT

## 2018-07-21 PROCEDURE — 85610 PROTHROMBIN TIME: CPT | Performed by: INTERNAL MEDICINE

## 2018-07-21 PROCEDURE — 00000146 ZZHCL STATISTIC GLUCOSE BY METER IP

## 2018-07-21 PROCEDURE — 40000274 ZZH STATISTIC RCP CONSULT EA 30 MIN

## 2018-07-21 PROCEDURE — 94640 AIRWAY INHALATION TREATMENT: CPT | Mod: 76

## 2018-07-21 PROCEDURE — 36592 COLLECT BLOOD FROM PICC: CPT | Performed by: INTERNAL MEDICINE

## 2018-07-21 PROCEDURE — 83735 ASSAY OF MAGNESIUM: CPT | Performed by: INTERNAL MEDICINE

## 2018-07-21 PROCEDURE — 85520 HEPARIN ASSAY: CPT | Performed by: INTERNAL MEDICINE

## 2018-07-21 PROCEDURE — 40000802 ZZH SITE CHECK

## 2018-07-21 PROCEDURE — 80048 BASIC METABOLIC PNL TOTAL CA: CPT | Performed by: INTERNAL MEDICINE

## 2018-07-21 PROCEDURE — 85025 COMPLETE CBC W/AUTO DIFF WBC: CPT | Performed by: INTERNAL MEDICINE

## 2018-07-21 PROCEDURE — 94640 AIRWAY INHALATION TREATMENT: CPT

## 2018-07-21 PROCEDURE — 99233 SBSQ HOSP IP/OBS HIGH 50: CPT | Mod: GC | Performed by: INTERNAL MEDICINE

## 2018-07-21 RX ORDER — ISOSORBIDE MONONITRATE 30 MG/1
30 TABLET, EXTENDED RELEASE ORAL DAILY
Status: DISCONTINUED | OUTPATIENT
Start: 2018-07-22 | End: 2018-07-24 | Stop reason: HOSPADM

## 2018-07-21 RX ORDER — WARFARIN SODIUM 4 MG/1
8 TABLET ORAL
Status: COMPLETED | OUTPATIENT
Start: 2018-07-21 | End: 2018-07-21

## 2018-07-21 RX ORDER — ALBUTEROL SULFATE 0.83 MG/ML
2.5 SOLUTION RESPIRATORY (INHALATION) EVERY 4 HOURS PRN
Status: DISCONTINUED | OUTPATIENT
Start: 2018-07-21 | End: 2018-07-24 | Stop reason: HOSPADM

## 2018-07-21 RX ORDER — AMIODARONE HYDROCHLORIDE 200 MG/1
200 TABLET ORAL DAILY
Status: DISCONTINUED | OUTPATIENT
Start: 2018-07-22 | End: 2018-07-24 | Stop reason: HOSPADM

## 2018-07-21 RX ORDER — HEPARIN SODIUM 10000 [USP'U]/100ML
0-3500 INJECTION, SOLUTION INTRAVENOUS
Status: DISCONTINUED | OUTPATIENT
Start: 2018-07-21 | End: 2018-07-22

## 2018-07-21 RX ORDER — HYDRALAZINE HYDROCHLORIDE 50 MG/1
50 TABLET, FILM COATED ORAL 3 TIMES DAILY
Status: DISCONTINUED | OUTPATIENT
Start: 2018-07-21 | End: 2018-07-24 | Stop reason: HOSPADM

## 2018-07-21 RX ADMIN — DOXYCYCLINE HYCLATE 100 MG: 100 CAPSULE ORAL at 08:05

## 2018-07-21 RX ADMIN — DIGOXIN 125 MCG: 125 TABLET ORAL at 08:04

## 2018-07-21 RX ADMIN — HYDRALAZINE HYDROCHLORIDE 50 MG: 50 TABLET ORAL at 21:33

## 2018-07-21 RX ADMIN — AMIODARONE HYDROCHLORIDE 400 MG: 200 TABLET ORAL at 08:05

## 2018-07-21 RX ADMIN — POTASSIUM CHLORIDE 20 MEQ: 750 TABLET, EXTENDED RELEASE ORAL at 08:18

## 2018-07-21 RX ADMIN — HYDRALAZINE HYDROCHLORIDE 50 MG: 50 TABLET ORAL at 00:08

## 2018-07-21 RX ADMIN — HYDRALAZINE HYDROCHLORIDE 50 MG: 50 TABLET ORAL at 06:42

## 2018-07-21 RX ADMIN — IPRATROPIUM BROMIDE AND ALBUTEROL SULFATE 3 ML: .5; 3 SOLUTION RESPIRATORY (INHALATION) at 08:31

## 2018-07-21 RX ADMIN — HYDRALAZINE HYDROCHLORIDE 50 MG: 50 TABLET ORAL at 13:43

## 2018-07-21 RX ADMIN — ISOSORBIDE DINITRATE 10 MG: 10 TABLET ORAL at 08:05

## 2018-07-21 RX ADMIN — CLOPIDOGREL 75 MG: 75 TABLET, FILM COATED ORAL at 08:05

## 2018-07-21 RX ADMIN — FAMOTIDINE 20 MG: 20 TABLET ORAL at 08:05

## 2018-07-21 RX ADMIN — ISOSORBIDE DINITRATE 10 MG: 10 TABLET ORAL at 18:34

## 2018-07-21 RX ADMIN — DOXYCYCLINE HYCLATE 100 MG: 100 CAPSULE ORAL at 20:01

## 2018-07-21 RX ADMIN — ALBUTEROL SULFATE 2.5 MG: 2.5 SOLUTION RESPIRATORY (INHALATION) at 14:13

## 2018-07-21 RX ADMIN — ROSUVASTATIN CALCIUM 20 MG: 20 TABLET, FILM COATED ORAL at 21:33

## 2018-07-21 RX ADMIN — WARFARIN SODIUM 8 MG: 4 TABLET ORAL at 18:34

## 2018-07-21 RX ADMIN — ISOSORBIDE DINITRATE 10 MG: 10 TABLET ORAL at 13:43

## 2018-07-21 RX ADMIN — BENZOCAINE AND MENTHOL 1 LOZENGE: 15; 3.6 LOZENGE ORAL at 21:33

## 2018-07-21 RX ADMIN — ASPIRIN 81 MG CHEWABLE TABLET 81 MG: 81 TABLET CHEWABLE at 08:05

## 2018-07-21 RX ADMIN — CEFDINIR 300 MG: 300 CAPSULE ORAL at 20:01

## 2018-07-21 RX ADMIN — CEFDINIR 300 MG: 300 CAPSULE ORAL at 08:05

## 2018-07-21 ASSESSMENT — ACTIVITIES OF DAILY LIVING (ADL)
ADLS_ACUITY_SCORE: 9

## 2018-07-21 NOTE — PROGRESS NOTES
Pt right groin site started bleeding when pt went to use the bathroom. Pressure applied, new dressing put on. Pulses good. Provider notified. Will continue to monitor and assess pt with every encounter.

## 2018-07-21 NOTE — PLAN OF CARE
Problem: Patient Care Overview  Goal: Plan of Care/Patient Progress Review  Outcome: No Change  D: Pt who presents this admission from OSH with cardiogenic shock and for further evaluation.  I/A: Pt alert and oriented x4. Pt sinus rhythm with BBB, with HRs in the 70s. Pt BP were soft this morning, provider notified and aware. Pt on RA with O2 sats >95%. Pt denies any pain, SOB, or nausea. Pt does report feeling fatigue with exertion but relieved with rest. Pt appetite good, last BM today. Pt BS this shift 134 and 150, Sliding scale insulin given 1x. Carb coverage insulin given 2x. Hep gtt continued therapeutically at 1050 units/hr. Pt reports of constant cough; RT paged, neb given 2x this shift. Pt voiding, and independently walking to bathroom.   P: Echo scheduled for tomorrow. Continue to monitor and assess pt with every encounter. Notify Cards 2 with any changes or questions.

## 2018-07-21 NOTE — PROGRESS NOTES
Pt BP has been soft this AM with MAPS 60-70s. Provider notified. Informed writer that its ok to give AM meds and to continue monitoring pt.

## 2018-07-21 NOTE — PLAN OF CARE
Problem: Cardiac: Heart Failure (Adult)  Goal: Signs and Symptoms of Listed Potential Problems Will be Absent, Minimized or Managed (Cardiac: Heart Failure)  Signs and symptoms of listed potential problems will be absent, minimized or managed by discharge/transition of care (reference Cardiac: Heart Failure (Adult) CPG).   Outcome: Improving  Shift: Pt transferred from  around 1600.  VS: Temp: 97.9  F (36.6  C) Temp src: Oral BP: 92/63 Pulse: 85 Heart Rate: 86 Resp: 18 SpO2: 94 % O2 Device: None (Room air) Oxygen Delivery: 1.5 LPM  Pain: Denies pain/discomfort  Neuro: A&Ox4  Cardiac: Tele SR with RBBB with HR in 80's, BP slightly soft    Respiratory: Lungs sound clear, Tolerating RA with SPO02> 94%. Observed frequent cough, given guaifenesin x 1  GI/Diet/Appetite: Good appetite, denies nausea.  :  Adequate UO, using urinal at bedside, BM soft x 1  LDA's: Triple lumen PICC with heparin gtt infusing at 10.5ml/hr.  Skin: Right groin access site dressing CDI  Activity: Up with SBA  Tests/Procedures:   Pertinent Labs/Lab Collection: Hep10A redraw scheduled at 2300     Plan: Continue with plan of care and update MD with any changes.

## 2018-07-21 NOTE — PROGRESS NOTES
"CARDIOLOGY PROGRESS NOTE    SUBJECTIVE:  No acute overnight events; Feeling overall well this AM. Still feeling lightheaded after isordill doses but improving.    ROS otherwise negative.    OBJECTIVE:  Vital signs:  Temp: 98.4  F (36.9  C) Temp src: Oral BP: (!) 80/53 Pulse: 85 Heart Rate: 78 Resp: 18 SpO2: 96 % O2 Device: None (Room air) Oxygen Delivery: 1.5 LPM Height: 162.6 cm (5' 4\") Weight: 77.1 kg (170 lb)  Estimated body mass index is 29.18 kg/(m^2) as calculated from the following:    Height as of this encounter: 1.626 m (5' 4\").    Weight as of this encounter: 77.1 kg (170 lb).    Vitals:    07/18/18 0500 07/19/18 0630 07/21/18 0229   Weight: 78.1 kg (172 lb 2.9 oz) 77 kg (169 lb 12.1 oz) 77.1 kg (170 lb)     Intake/Output Summary (Last 24 hours) at 07/21/18 1358  Last data filed at 07/21/18 1223   Gross per 24 hour   Intake          1367.65 ml   Output              950 ml   Net           417.65 ml     PHYSICAL EXAM:   General: AAO x 3,  no clubbing/cyanosis, no edema, JVP flat  HEENT:  PERRL, MMM with out pharyngeal erythema.   Cardiac: RRR. No m/r/g. Normal S1, S2. DP2+ bilaterally  Pulm: CTAB, no wheezes, no crackles.  Abd: +BS, soft, non distended, non tender. No hepatosplenomegaly.  Skin: No rash  MSK: No deformities, no joint swelling, warm and well perfused    Neuro: CN grossly intact, no focal deficits  Psych: Appropriate mood, full affect    Infusions:  Heparin GTT high intensity     ASSESSMENT/PLAN:  Mr. Mariusz Sharp is a 57-year-old male with medical history significant for recent STEMI (6/27) s/p PCI with ALYSSA to ostial LAD, type 2 DM and CKD stage 3 who was readmitted to [facility] on 7/2 with VT s/p DCCV, the admission was complicated by cardiogenic shock with new LVEF 15% (see more details below). He was transferred to [facility] on 7/16 for further management.      CHANGES TODAY  - Hydralazine 50mg TID (From QID)   - Appreciate DM Management Recs   - Neuropsych to see pt Monday   - ANDREW stable "   - Start Imdur in the AM   - Stop abx after todays doses  - Amio load completed today (400bid from 7/9-7/15 and 400qday from 7/16-7/21); Start 200qday tomorrow.      Neurology  No active issues, no sedation needed.      Respiratory  # Acute Bronchitis    From care everywhere, patient developed leukocytosis and elevated pro-calcitonin, no infiltration on CXR. Ceftriaxone and doxycycline was started on 7/16 for bronchitis. Now on cefdinir and doxycyline. No leukocytosis on admission. No respiratory symptoms.      - Finish out 5 day course of doxycycline and cefdinir on 7/21/18  - Duoneb QID        Cardiovascular  # Cardiogenic shock   # HFrEF, ICM, LVEF 15%    ACC stage C, NYHA III, TTE (7/5) showed LVEF 15%, with multiple wall motion abnormalities, likely ischemic. Per care everywhere, patient developed cardiogenic shock on 7/11, although initial [place] numbers not available. [place] and IABP were placed. He received milrinone gtt and diuresis with furosemide gtt, prn chlorothiazide and metolazone. At [facility], CVP 12, PCWP 24, CI 2.2 and lactate 1.1 (on milrinone gtt 0.25 en route). He was well perfused from exam. Patient switched from Milrinone to Dobutamine on arrival with CI holding 1.8-2.0. Patient diuresed well with CVP falling to 5 and PCWP 14.     - Telemetry, strict I&O, weight daily  - Diuresis: Hold   - Inotrope: Off of dobutamine   - ACEI/ARB: hold off for now due to ANDREW   - Beta blocker: hold off for now due to cardiogenic shock   - S/p IABP removed.   - LVAD work-up initiated with VAD show and tell, SW and Neuropsych consult.  - Isordil 10mg TID and Hydral 50 QID   - Follow MVO2   - Cardiac Rehab consult placed       # Recent STEMI s/p PCI with ALYSSA to ostial LAD (6/27)   Troponin on admission 1.46, no chest pain    - Continue Plavix 75 mg daily, Start ASA 81mg qday  - Heparin GTT   - Start Crestor 7/19/18      # VT s/p ICD  Patient developed VT on 7/2 s/p ICD placement, S/p ICD shocked x 2 on 7/16 prior to  transfer.  - EP following appreciate recs  - Start Amiodarone 400 mg daily     # Atrial flutter   # LV thrombus    Patient developed atrial flutter on 7/2. TTE on 7/5 showed LV clot. [id number] 2 (heart failure and diabetes). EKG on arrival showed NSR.    - High intensity heparin gtt  - Start warfarin tonight      Renal  # Non-oliguric ANDREW on CKD stage III   Baseline creatinine around 1-1.2 (7/2), creatinines from 7/5 to 7/16 were persistently > 3. Cr on admission at [facility] is 2.79.      - Continue to monitor   - Avoid nephrotoxic medications  - Maintain hemodynamic      - Hold diuretics for now      Infectious disease  # Bronchitis   - Ceftriaxone 2 g IV (7/14)  - Cefdinir 300 mg bid (7/16-present), Day 5/5  - Doxycycline 100 mg bid (7/16-present), Day 5/5      Gastrointestinal  No active issues       # Nutrition  Consistent carb with 2 g sodium and 1.5 L fluid restriction       # Stress ulcer Prophylaxis: Famotidine 20 mg PO bid       Endocrine  # Type 2 DM   Last HGBA1C 15 (6/2018),  - Glargine to 35 units  - Increase sliding scale insulin to high intensity  - Carb coverage 1:7 grams of carbs   - Diabetes consult     Heme/Onc  # Normocytic anemia   Likely chronic  - Continue to monitor        DVT Prophylaxis: Heparin gtt  Indwelling lines: Vascular access: PIV , R Triple Lumen PICC   Code Status: Full code      Seen and staffed with Dr. Ortiz.     Roc Cisneros MD  Internal Medicine Resident  Cardiology Service  [phone]       I have reviewed today's vital signs, notes, medications, labs and imaging. I have also seen and examined the patient and agree with the findings and plan as outlined above.    Jenni Ortiz MD  Section Head - Advanced Heart Failure, Transplantation and Mechanical Circulatory Support  Co-Director - Adult Congenital and Cardiovascular Genetics Center  Associate Professor of Medicine, [facility]

## 2018-07-21 NOTE — PLAN OF CARE
"Problem: Patient Care Overview  Goal: Plan of Care/Patient Progress Review  Outcome: No Change  D: Heart failure. LVAD workup.  I/A: VSS. Denies pain. Sinus rhythm with BBB. Heparin drip continues at 1050 unit(s)/hr. Slept well this shift. Pt reports some \"tingling all over\" feeling when he initially sat up this morning. Feeling went away after a little while in seated position. Pt would like to take a shower today.   P: Continue to monitor.      "

## 2018-07-21 NOTE — PROGRESS NOTES
Diabetes Consult Daily  Progress Note          Assessment/Plan:     Mr. Mariusz Sharp (Pepe) is a 56 yo man with a history of uncontrolled type 2 diabetes, recent STEMI on 6/27/18 s/p PCI, and CKD stage 3, who was readmitted on 7/2 to [facility] in [place] with VT and developed cardiogenic shock, transferred to [facility] on 7/16/18 for further management of heart failure.    Glucoses improving with increased insulin to carb ratio and decrease in glargine.  Uncertain if he will have coverage for Victoza (see interval hx).    Plan:  -Glargine: continue 25 units qPM  -aspart for meals and snacks: increased this morning from 1unit/10g to 1unit/7g CHO  -aspart for correction: continue high intensity ac and hs  -monitor glucoses ac, hs and 0200    Red is already paying attention to carb intake- if he requires aspart at discharge (ie cannot start Victoza) would recommend he try using insulin to carb ratio (will see if he would like to meet with RD for education on this prior to discharge).       Outpatient diabetes follow up: with PCP in [place] within 1-2 weeks of discharge.  Plan discussed with patient and Dr. Cisneros from Cardiology team.           Interval History:     The last 24 hours progress and nursing notes reviewed.  Red continues to feel better.  Appetite is good (but still below baseline).  Creatinine stable at 1.8.  Glucoses improving.  Tried running test claim for Victoza again, but per discharge pharm insurance listed is no longer active. Discussed with pt- he called number on card. They confirmed that plan is no longer active as of July 1.  Pt feeling stressed.  I paged - but no call back (likely not on this weekend).  I discussed with cardiology team.    PTA Regimen:  Prior to STEMI on 6/26- no medications, was monitoring his carb intake a little.  Upon discharge from STEMI admission: glargine 50 units (only home 2 days before readmitted)      Recent Labs  Lab  "07/21/18  1130 07/21/18  0715 07/21/18  0649 07/20/18  2120 07/20/18  1605 07/20/18  1342 07/20/18  0801 07/20/18  0403  07/19/18  0414  07/18/18  0350  07/17/18  2237  07/17/18  0826   GLC  --   --  125*  --   --   --   --  124*  --  157*  --  238*  --  336*  --  242*   * 134*  --  203* 247* 164* 109*  --   < >  --   < >  --   < >  --   < >  --    < > = values in this interval not displayed.            Review of Systems:   See interval hx          Medications:       Active Diet Order      Combination Diet 3566-8470 Calories: Moderate Consistent CHO (4-6 CHO units/meal); 2 gm NA Diet     Physical Exam:  Gen: Alert, resting in bed, in NAD   HEENT: NC/AT, mucous membranes are moist  Resp: Unlabored  Neuro:oriented x3, communicating clearly  /73 (BP Location: Left arm)  Pulse 85  Temp 98.2  F (36.8  C) (Oral)  Resp 18  Ht 1.626 m (5' 4\")  Wt 77.1 kg (170 lb)  SpO2 97%  BMI 29.18 kg/m2           Data:     Lab Results   Component Value Date    A1C 11.9 07/17/2018    A1C 12.3 07/16/2018              CBC RESULTS:   Recent Labs   Lab Test  07/21/18   0748   WBC  8.0   RBC  3.47*   HGB  9.9*   HCT  32.1*   MCV  93   MCH  28.5   MCHC  30.8*   RDW  14.0   PLT  205     Recent Labs   Lab Test  07/21/18   0649  07/20/18   0403   NA  140  137   POTASSIUM  3.8  3.7   CHLORIDE  104  100   CO2  26  27   ANIONGAP  9  10   GLC  125*  124*   BUN  59*  70*   CR  1.83*  1.85*   ARLENE  8.7  8.4*     Liver Function Studies -   Recent Labs   Lab Test  07/19/18   0414   PROTTOTAL  7.1   ALBUMIN  2.6*   BILITOTAL  1.0   ALKPHOS  121   AST  39   ALT  88*     Lab Results   Component Value Date    INR 1.23 07/21/2018    INR 1.35 07/20/2018    INR 1.18 07/19/2018    INR 1.23 07/17/2018    INR 1.22 07/16/2018       Melody Gutierrez PA-C [phone]  Diabetes Management job code [id number]          "

## 2018-07-22 ENCOUNTER — APPOINTMENT (OUTPATIENT)
Dept: ULTRASOUND IMAGING | Facility: CLINIC | Age: 58
DRG: 270 | End: 2018-07-22
Attending: INTERNAL MEDICINE
Payer: COMMERCIAL

## 2018-07-22 ENCOUNTER — APPOINTMENT (OUTPATIENT)
Dept: CARDIOLOGY | Facility: CLINIC | Age: 58
DRG: 270 | End: 2018-07-22
Attending: INTERNAL MEDICINE
Payer: COMMERCIAL

## 2018-07-22 ENCOUNTER — APPOINTMENT (OUTPATIENT)
Dept: CT IMAGING | Facility: CLINIC | Age: 58
DRG: 270 | End: 2018-07-22
Attending: INTERNAL MEDICINE
Payer: COMMERCIAL

## 2018-07-22 LAB
ANION GAP SERPL CALCULATED.3IONS-SCNC: 11 MMOL/L (ref 3–14)
BUN SERPL-MCNC: 54 MG/DL (ref 7–30)
CALCIUM SERPL-MCNC: 8.6 MG/DL (ref 8.5–10.1)
CHLORIDE SERPL-SCNC: 106 MMOL/L (ref 94–109)
CO2 SERPL-SCNC: 22 MMOL/L (ref 20–32)
CREAT SERPL-MCNC: 1.67 MG/DL (ref 0.66–1.25)
EJECTION FRACTION: 23
ERYTHROCYTE [DISTWIDTH] IN BLOOD BY AUTOMATED COUNT: 14.3 % (ref 10–15)
GFR SERPL CREATININE-BSD FRML MDRD: 43 ML/MIN/1.7M2
GLUCOSE BLDC GLUCOMTR-MCNC: 101 MG/DL (ref 70–99)
GLUCOSE BLDC GLUCOMTR-MCNC: 134 MG/DL (ref 70–99)
GLUCOSE BLDC GLUCOMTR-MCNC: 160 MG/DL (ref 70–99)
GLUCOSE BLDC GLUCOMTR-MCNC: 204 MG/DL (ref 70–99)
GLUCOSE SERPL-MCNC: 166 MG/DL (ref 70–99)
HCT VFR BLD AUTO: 31.5 % (ref 40–53)
HGB BLD-MCNC: 9.6 G/DL (ref 13.3–17.7)
INR PPP: 1.22 (ref 0.86–1.14)
LMWH PPP CHRO-ACNC: <0.1 IU/ML
MAGNESIUM SERPL-MCNC: 2 MG/DL (ref 1.6–2.3)
MCH RBC QN AUTO: 28.6 PG (ref 26.5–33)
MCHC RBC AUTO-ENTMCNC: 30.5 G/DL (ref 31.5–36.5)
MCV RBC AUTO: 94 FL (ref 78–100)
PLATELET # BLD AUTO: 221 10E9/L (ref 150–450)
POTASSIUM SERPL-SCNC: 4.1 MMOL/L (ref 3.4–5.3)
RBC # BLD AUTO: 3.36 10E12/L (ref 4.4–5.9)
SODIUM SERPL-SCNC: 139 MMOL/L (ref 133–144)
WBC # BLD AUTO: 6.9 10E9/L (ref 4–11)

## 2018-07-22 PROCEDURE — 36592 COLLECT BLOOD FROM PICC: CPT | Performed by: INTERNAL MEDICINE

## 2018-07-22 PROCEDURE — 93306 TTE W/DOPPLER COMPLETE: CPT | Mod: 26 | Performed by: INTERNAL MEDICINE

## 2018-07-22 PROCEDURE — 25000132 ZZH RX MED GY IP 250 OP 250 PS 637: Performed by: INTERNAL MEDICINE

## 2018-07-22 PROCEDURE — 25000132 ZZH RX MED GY IP 250 OP 250 PS 637: Performed by: STUDENT IN AN ORGANIZED HEALTH CARE EDUCATION/TRAINING PROGRAM

## 2018-07-22 PROCEDURE — 85610 PROTHROMBIN TIME: CPT | Performed by: INTERNAL MEDICINE

## 2018-07-22 PROCEDURE — 25500064 ZZH RX 255 OP 636: Performed by: INTERNAL MEDICINE

## 2018-07-22 PROCEDURE — 70450 CT HEAD/BRAIN W/O DYE: CPT

## 2018-07-22 PROCEDURE — 85027 COMPLETE CBC AUTOMATED: CPT | Performed by: INTERNAL MEDICINE

## 2018-07-22 PROCEDURE — 80048 BASIC METABOLIC PNL TOTAL CA: CPT | Performed by: INTERNAL MEDICINE

## 2018-07-22 PROCEDURE — 85520 HEPARIN ASSAY: CPT | Performed by: INTERNAL MEDICINE

## 2018-07-22 PROCEDURE — 93306 TTE W/DOPPLER COMPLETE: CPT

## 2018-07-22 PROCEDURE — 83735 ASSAY OF MAGNESIUM: CPT | Performed by: INTERNAL MEDICINE

## 2018-07-22 PROCEDURE — 71250 CT THORAX DX C-: CPT

## 2018-07-22 PROCEDURE — 93925 LOWER EXTREMITY STUDY: CPT

## 2018-07-22 PROCEDURE — 40000802 ZZH SITE CHECK

## 2018-07-22 PROCEDURE — 25000128 H RX IP 250 OP 636: Performed by: INTERNAL MEDICINE

## 2018-07-22 PROCEDURE — 93880 EXTRACRANIAL BILAT STUDY: CPT

## 2018-07-22 PROCEDURE — 21400006 ZZH R&B CCU INTERMEDIATE UMMC

## 2018-07-22 PROCEDURE — 99233 SBSQ HOSP IP/OBS HIGH 50: CPT | Mod: 25 | Performed by: INTERNAL MEDICINE

## 2018-07-22 PROCEDURE — 00000146 ZZHCL STATISTIC GLUCOSE BY METER IP

## 2018-07-22 RX ORDER — WARFARIN SODIUM 10 MG/1
10 TABLET ORAL
Status: COMPLETED | OUTPATIENT
Start: 2018-07-22 | End: 2018-07-22

## 2018-07-22 RX ADMIN — ISOSORBIDE MONONITRATE 30 MG: 30 TABLET, EXTENDED RELEASE ORAL at 08:20

## 2018-07-22 RX ADMIN — HYDRALAZINE HYDROCHLORIDE 50 MG: 50 TABLET ORAL at 21:40

## 2018-07-22 RX ADMIN — WARFARIN SODIUM 10 MG: 10 TABLET ORAL at 17:37

## 2018-07-22 RX ADMIN — HYDRALAZINE HYDROCHLORIDE 50 MG: 50 TABLET ORAL at 10:55

## 2018-07-22 RX ADMIN — ROSUVASTATIN CALCIUM 20 MG: 20 TABLET, FILM COATED ORAL at 21:40

## 2018-07-22 RX ADMIN — BENZOCAINE AND MENTHOL 1 LOZENGE: 15; 3.6 LOZENGE ORAL at 21:41

## 2018-07-22 RX ADMIN — ASPIRIN 81 MG CHEWABLE TABLET 81 MG: 81 TABLET CHEWABLE at 08:20

## 2018-07-22 RX ADMIN — HYDRALAZINE HYDROCHLORIDE 50 MG: 50 TABLET ORAL at 16:57

## 2018-07-22 RX ADMIN — AMIODARONE HYDROCHLORIDE 200 MG: 200 TABLET ORAL at 08:20

## 2018-07-22 RX ADMIN — CLOPIDOGREL 75 MG: 75 TABLET, FILM COATED ORAL at 08:20

## 2018-07-22 RX ADMIN — BENZOCAINE AND MENTHOL 1 LOZENGE: 15; 3.6 LOZENGE ORAL at 00:50

## 2018-07-22 RX ADMIN — DIGOXIN 125 MCG: 125 TABLET ORAL at 08:20

## 2018-07-22 RX ADMIN — MAGNESIUM SULFATE HEPTAHYDRATE 2 G: 40 INJECTION, SOLUTION INTRAVENOUS at 10:03

## 2018-07-22 RX ADMIN — Medication 12.5 MG: at 13:14

## 2018-07-22 RX ADMIN — FAMOTIDINE 20 MG: 20 TABLET ORAL at 08:20

## 2018-07-22 RX ADMIN — HUMAN ALBUMIN MICROSPHERES AND PERFLUTREN 5 ML: 10; .22 INJECTION, SOLUTION INTRAVENOUS at 11:45

## 2018-07-22 ASSESSMENT — ACTIVITIES OF DAILY LIVING (ADL)
ADLS_ACUITY_SCORE: 9

## 2018-07-22 NOTE — PLAN OF CARE
Problem: Patient Care Overview  Goal: Plan of Care/Patient Progress Review    A&Ox4. A/VSS on RA. At 1645, pt began bleeding from R groin site, pressure applied until MDs arrived. Pt remained vitally stable. MD and RNs placed continuous pressure for 30min and new pressure dressing applied by MD. Site is c/d/i at this time. Continue to monitor. Pt ate dinner, , received sliding scale insulin and carb coverage. Continue with POC.

## 2018-07-22 NOTE — PROGRESS NOTES
"CLINICAL NUTRITION SERVICES - ASSESSMENT NOTE     Nutrition Prescription    RECOMMENDATIONS FOR MDs/PROVIDERS TO ORDER:  Please note for pre VAD planning purposes, pt with BMI 29.3 kg/m^2    Malnutrition Status:    Unable to determine due to pt busy during attempts to visit today    Recommendations already ordered by Registered Dietitian (RD):  None at this time     Future/Additional Recommendations:  Encourage patient to consume at least 75% of meals TID or the equivalent with snacks/supplements.  If consuming less than this offer supplements, scheduled snacks, and/or consider ordering calorie counts to assess PO intake adequacy.     REASON FOR ASSESSMENT  Mariusz Sharp is a/an 57 year old male assessed by the dietitian for Provider Order - \"Heart Failure pre VAD planning    NUTRITION HISTORY  PMH includes recent STEMI, T2DM, and stage 3 CKD.  Recent admission to OSH on 7/2 c/b cardiogenic shock and new LVEF 15%. Per endocrinology note yesterday, pt diagnosed with diabetes 2 years ago    CURRENT NUTRITION ORDERS  Diet: 2 g Sodium, Moderate Consistent Carbohydrate and 2000 mL Fluid Restriction  Intake/Tolerance: Good appetite since admit and eating 100% of meals documented, but per provider notes pt reports he is eating below his baseline    LABS  Labs reviewed  - HgbA1c 11.9% (H) on 7/17    MEDICATIONS  Medications reviewed   - Novolog (1 unit insulin per 7 g CHO); Lantus, high sliding scale insulin with meals TID and @ bedtime    ANTHROPOMETRICS  Height: 162.6 cm (5' 4\")  Most Recent Weight: 77.4 kg (170 lb 11.2 oz)    IBW: 59.1 kg (131% IBW)  BMI: 29.3 kg/m^2 (Overweight BMI 25-29.9)  Weight History: Limited recorded wt hx available  Wt Readings from Last 10 Encounters:   07/22/18 77.4 kg (170 lb 11.2 oz)      Dosing Weight: 64 kg (adjusted based on admit wt 77.4 kg and IBW)    ASSESSED NUTRITION NEEDS  Estimated Energy Needs: 6323-9537 kcals/day (25 - 30 kcals/kg)  Justification: Maintenance  Estimated Protein " Needs: 70-90 grams protein/day (1.1 - 1.4 grams of pro/kg)  Justification: Increased needs with cardiac status  Estimated Protein needs (after LVAD if placed):  grams protein/day (1.5 - 2 grams of pro/kg)  Justification: Increased needs s/p LVAD  Estimated Fluid Needs: 2000 mL/day fluid restriction  Justification: On a fluid restriction per provider     PHYSICAL FINDINGS  See malnutrition section below.    MALNUTRITION  % Intake: Unable to assess  % Weight Loss: Unable to assess  Subcutaneous Fat Loss: Unable to assess  Muscle Loss: Unable to assess  Fluid Accumulation/Edema: None noted per provider note today  Malnutrition Diagnosis: Unable to determine due to pt busy during attempts to visit today    NUTRITION DIAGNOSIS  Predicted inadequate nutrient intake (protein-energy) related to good appetite and eating 100% of meals documented since admission but potential for PO intake decline with hospital LOS and pt reports current PO is actually below his baseline    INTERVENTIONS  Implementation  Nutrition Education: Unable to complete due to pt busy during attempts to visit.  Nutrition education to be completed at later time as able.    Goals  Patient to consume % of nutritionally adequate meal trays TID, or the equivalent with supplements/snacks.     Monitoring/Evaluation  Progress toward goals will be monitored and evaluated per protocol.     Katie Self, MARQUISE, LD  6C RD Pager: [phone]

## 2018-07-22 NOTE — PROGRESS NOTES
"CARDIOLOGY PROGRESS NOTE    SUBJECTIVE:  No acute overnight events; Feeling overall well this AM. No further lightheadedness. He denies any further bleeding from his R groin site. Suture removed from internal jugular site this am.     4PT ROS otherwise negative.    OBJECTIVE:  Vital signs:  Temp: 98.1  F (36.7  C) Temp src: Oral BP: (!) 85/56 Pulse: 74 Heart Rate: 76 Resp: 20 SpO2: 95 % O2 Device: None (Room air) Oxygen Delivery: 1.5 LPM Height: 162.6 cm (5' 4\") Weight: 77.4 kg (170 lb 11.2 oz)  Estimated body mass index is 29.3 kg/(m^2) as calculated from the following:    Height as of this encounter: 1.626 m (5' 4\").    Weight as of this encounter: 77.4 kg (170 lb 11.2 oz).    Vitals:    07/19/18 0630 07/21/18 0229 07/22/18 0051   Weight: 77 kg (169 lb 12.1 oz) 77.1 kg (170 lb) 77.4 kg (170 lb 11.2 oz)       Intake/Output Summary (Last 24 hours) at 07/22/18 0925  Last data filed at 07/22/18 0804   Gross per 24 hour   Intake           780.73 ml   Output             1075 ml   Net          -294.27 ml     PHYSICAL EXAM:   General: AAO x 3,  no clubbing/cyanosis, no edema, JVP flat but with +HJR  HEENT:  PERRL, MMM with out pharyngeal erythema.   Cardiac: RRR. No m/r/g. Normal S1, S2. DP2+ bilaterally  Pulm: CTAB, no wheezes, no crackles.  Abd: +BS, soft, non distended, non tender. No hepatosplenomegaly.  Skin: No rash  MSK: No deformities, no joint swelling, warm and well perfused    Neuro: CN grossly intact, no focal deficits  Psych: Appropriate mood, full affect    Infusions:  None     ASSESSMENT/PLAN:  Mr. Mariusz Sharp is a 57-year-old male with medical history significant for recent STEMI (6/27) s/p PCI with ALYSSA to ostial LAD, type 2 DM and CKD stage 3 who was readmitted to [facility] on 7/2 with VT s/p DCCV, the admission was complicated by cardiogenic shock with new LVEF 15% (see more details below). He was transferred to [facility] on 7/16 for further management.      CHANGES TODAY  - Removed stitch from RIJ " Site  - Start Metoprolol Xl 12.5mg qday   - Continue Warfarin  - Start imdur 30mg qday and Continue hydral 50mg TID    - Appreciate DM Management Recs   - Neuropsych to see pt Monday   - ANDREW stable   - ABx completed   - Start LVAD workup     # Cardiogenic shock   # HFrEF, ICM, LVEF 15%    ACC stage C, NYHA III, TTE (7/5) showed LVEF 15%, with multiple wall motion abnormalities, likely ischemic. Per care everywhere, patient developed cardiogenic shock on 7/11, although initial Ridge numbers not available. Ridge and IABP were placed. He received milrinone gtt and diuresis with furosemide gtt, prn chlorothiazide and metolazone. At [facility], CVP 12, PCWP 24, CI 2.2 and lactate 1.1 (on milrinone gtt 0.25 en route). He was well perfused from exam. Patient switched from Milrinone to Dobutamine on arrival with CI holding 1.8-2.0. Patient diuresed well with CVP falling to 5 and PCWP 14.     - Telemetry, strict I&O, weight daily  - Diuresis: Hold   - Inotrope: Off of dobutamine   - ACEI/ARB: hold off for now due to ANDREW   - Beta blocker: Start 12.5mg XL qday   - S/p IABP removed.   - LVAD work-up initiated   - Isordil Imdur 30qday and Hydral 50 TID   - Follow MVO2 intermittently   - Cardiac Rehab consult placed     # Recent STEMI s/p PCI with ALYSSA to ostial LAD (6/27)   Troponin on admission 1.46, no chest pain    - Continue Plavix 75 mg daily, Start ASA 81mg qday  - Heparin GTT   - Start Crestor 7/19/18      # VT s/p ICD  Patient developed VT on 7/2 s/p ICD placement, S/p ICD shocked x 2 on 7/16 prior to transfer.  Amio load completed (400bid from 7/9-7/15 and 400qday from 7/16-7/21); Start 200qday tomorrow.  - EP following appreciate recs  - Amiodarone 200 qday     # Atrial flutter   # LV thrombus    Patient developed atrial flutter on 7/2. TTE on 7/5 showed LV clot. [id number] (heart failure and diabetes). EKG on arrival showed NSR.    - Warfarin    # Acute Bronchitis: Resolving.   From care everywhere, patient developed  leukocytosis and elevated pro-calcitonin, no infiltration on CXR. Ceftriaxone and doxycycline was started on 7/16 for bronchitis.      - Finished out 5 day course of doxycycline and cefdinir on 7/21/18  - Duoneb QID      # Non-oliguric ANDREW on CKD stage III   Baseline creatinine around 1-1.2 (7/2), creatinines from 7/5 to 7/16 were persistently > 3. Cr on admission at [facility] is 2.79.      - Continue to monitor   - Avoid nephrotoxic medications  - Maintain hemodynamic      - Hold diuretics for now    # Type 2 DM   Last HGBA1C 15 (6/2018),  - Glargine to 35 units  - Increase sliding scale insulin to high intensity  - Carb coverage 1:7 grams of carbs   - Diabetes consult    # Normocytic anemia: Chronic   - Continue to monitor        DVT Prophylaxis: Warfarin - INR is however subtherapeutic.   Indwelling lines: Vascular access: R Triple Lumen PICC  Code Status: Full code      Seen and staffed with Dr. Ortiz.     Roc Cisneros MD  Internal Medicine Resident  Cardiology Service  [phone]       Late entry - pt seen and examined on 7/22/18  I have reviewed today's vital signs, notes, medications, labs and imaging. I have also seen and examined the patient and agree with the findings and plan as outlined above.    Jenni Ortiz MD  Section Head - Advanced Heart Failure, Transplantation and Mechanical Circulatory Support  Co-Director - Adult Congenital [facility] Cardiovascular [facility]etics [facility]  Associate Professor of Medicine, [facility]

## 2018-07-22 NOTE — PLAN OF CARE
Problem: Patient Care Overview  Goal: Plan of Care/Patient Progress Review  Outcome: No Change  D: Pt who presented to OSH with cardiogenic shock; here for further evaluation. LVAD workup started today.  I/A: Pt alert and oriented x4. Pt sinus rhythm with BBB; HRs in the 70s. Pt BP this morning soft, provider aware. Pt on RA with O2 sats >95%. Pt denies any pain, SOB at rest, nausea or dizziness. Pt magnesium this AM was 2.0, replaced 1x. Next recheck tomorrow morning. Pt appetite good, last BM yesterday. BS this shift 160 and 101, Sliding scale insulin given 1x. Carb coverage given 2x this shift. Echo and US done at the bedside today. Pt went down for head and chest CT.  P: Continue LVAD workup. LVAD appointment scheduled for tomorrow. Continue to monitor and assess pt with every encounter. Notify Cards 2 with any changes or questions.

## 2018-07-22 NOTE — PLAN OF CARE
Problem: Goal Outcome Summary  Goal: Goal Outcome Summary  RN  1. Pt will be hemodynamically stable.  2. Pt and family will verbalize understanding of plan of care.  3. Pt will remain free of falls  4. Pain will be under control or minimal    Outcome: Improving    D: Admitted 7/16 for CHF further management from [place]. Pt has h/o recent STEMI on 6/27 (EF 15 %) s/p PCI to ostial LAD, VT on 7/2 s/p ICD placement s/p shocked x 2 on 7/16, DM II and CKD III.   I: Monitored vitals and assessed pt status.  A: A0x4. VSS. Room air. SR w/ BBB (HR 70 - 80's). Afebrile. Pt denies pain. Patient sleeping between cares.   P: Will continue to monitor and manage pt per plan of care. Please report any pertinent change (s) in pt's condition.

## 2018-07-22 NOTE — PROGRESS NOTES
Diabetes Consult Daily  Progress Note          Assessment/Plan:     Mr. Mariusz Sharp (Pepe) is a 56 yo man with a history of uncontrolled type 2 diabetes, recent STEMI on 6/27/18 s/p PCI, and CKD stage 3, who was readmitted on 7/2 to [facility] in [place] with VT and developed cardiogenic shock, transferred to [facility] on 7/16/18 for further management of heart failure.    BG remains mostly in the mid 100s but some erratic spikes and drops in past 24h -- possibly related to variable physical activity level.    Plan:  -Glargine: continue 25 units qPM  -aspart for meals and snacks: continue 1unit/7g CHO  -aspart for correction: continue high intensity ac and hs  -monitor glucoses ac, hs and 0200    If pt has coverage for Victoza then we would plan on discharging pt on glargine and Victoza (likely no aspart).  If he does not have coverage for Victoza he is comfortable with using insulin to carb ratio and sliding scale to dose aspart.       Outpatient diabetes follow up: with PCP in [place] within 1-2 weeks of discharge.  Plan discussed with patient.           Interval History:     The last 24 hours progress and nursing notes reviewed.  Red has no complaints today, appetite is good.  Creatinine is down to 1.6 today.  He is feeling less stressed than yesterday b/c he was able to discuss insurance issue with HR at his job-- they reassured him he still has insurance, and he has been mailed a new card (Red assumes this was delivered to his dad's house, and he is having his brother look for it).  Discharge pharmacy is calling his insurance company to see about coverage for Victoza.    Glucose sonali to the 200s at HS.  Red says he does not snack at all.  He had ramos food cake with supper (so more carbs than usual), but got carb coverage for this.  Correction aspart was held at HS for unclear reason.  Today midday BG is down to 101 after getting same insulin to carb ratio.  We discussed plan for aspart  "dosing at discharge if we are unable to start Victoza: Red has no trouble carb counting and using insulin to carb ratio and sliding scale at home.  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 He feels comfortable doing this at home.    PTA Regimen:  Prior to STEMI on 6/26- no medications, was monitoring his carb intake a little.  Upon discharge from STEMI admission: glargine 50 units (only home 2 days before readmitted)      Recent Labs  Lab 07/22/18  1201 07/22/18  0719 07/22/18  0551 07/21/18  2142 07/21/18  1735 07/21/18  1130 07/21/18  0715 07/21/18  0649  07/20/18  0403  07/19/18  0414  07/18/18  0350  07/17/18  2237   GLC  --   --  166*  --   --   --   --  125*  --  124*  --  157*  --  238*  --  336*   * 160*  --  254* 156* 150* 134*  --   < >  --   < >  --   < >  --   < >  --    < > = values in this interval not displayed.            Review of Systems:   See interval hx          Medications:       Active Diet Order      Combination Diet 8554-6248 Calories: Moderate Consistent CHO (4-6 CHO units/meal); 2 gm NA Diet     Physical Exam:  Gen: Alert, resting in bed, in NAD   HEENT: NC/AT, mucous membranes are moist  Resp: Unlabored  Neuro:oriented x3, communicating clearly  BP 90/63 (BP Location: Left arm)  Pulse 74  Temp 98.4  F (36.9  C) (Oral)  Resp 18  Ht 1.626 m (5' 4\")  Wt 77.4 kg (170 lb 11.2 oz)  SpO2 96%  BMI 29.3 kg/m2           Data:     Lab Results   Component Value Date    A1C 11.9 07/17/2018    A1C 12.3 07/16/2018            Recent Labs   Lab Test  07/22/18   0551  07/21/18   0649   NA  139  140   POTASSIUM  4.1  3.8   CHLORIDE  106  104   CO2  22  26   ANIONGAP  11  9   GLC  166*  125*   BUN  54*  59*   CR  1.67*  1.83*   ALRENE  8.6  8.7     CBC RESULTS:   Recent Labs   Lab Test  07/22/18   0551   WBC  6.9   RBC  3.36*   HGB  9.6*   HCT  31.5*   MCV  94   MCH  28.6   MCHC  30.5*   RDW  14.3   PLT  221       Melody Gutierrez" CACHORRO [phone]  Diabetes Management job code [id number]

## 2018-07-23 ENCOUNTER — RESULTS ONLY (OUTPATIENT)
Dept: OTHER | Facility: CLINIC | Age: 58
End: 2018-07-23

## 2018-07-23 ENCOUNTER — APPOINTMENT (OUTPATIENT)
Dept: OCCUPATIONAL THERAPY | Facility: CLINIC | Age: 58
DRG: 270 | End: 2018-07-23
Attending: INTERNAL MEDICINE
Payer: COMMERCIAL

## 2018-07-23 DIAGNOSIS — I50.9 HEART FAILURE (H): Primary | ICD-10-CM

## 2018-07-23 LAB
ABO + RH BLD: NORMAL
ABO + RH BLD: NORMAL
ANION GAP SERPL CALCULATED.3IONS-SCNC: 8 MMOL/L (ref 3–14)
BLD GP AB SCN SERPL QL: NORMAL
BLOOD BANK CMNT PATIENT-IMP: NORMAL
BUN SERPL-MCNC: 51 MG/DL (ref 7–30)
CALCIUM SERPL-MCNC: 8.8 MG/DL (ref 8.5–10.1)
CARDIOLIPIN ANTIBODY IGG: <1.6 GPL-U/ML (ref 0–19.9)
CARDIOLIPIN ANTIBODY IGM: 5.8 MPL-U/ML (ref 0–19.9)
CHLORIDE SERPL-SCNC: 109 MMOL/L (ref 94–109)
CO2 SERPL-SCNC: 22 MMOL/L (ref 20–32)
CREAT SERPL-MCNC: 1.74 MG/DL (ref 0.66–1.25)
CRP SERPL-MCNC: 11 MG/L (ref 0–8)
ERYTHROCYTE [DISTWIDTH] IN BLOOD BY AUTOMATED COUNT: 14.7 % (ref 10–15)
FERRITIN SERPL-MCNC: 646 NG/ML (ref 26–388)
GFR SERPL CREATININE-BSD FRML MDRD: 41 ML/MIN/1.7M2
GLUCOSE BLDC GLUCOMTR-MCNC: 100 MG/DL (ref 70–99)
GLUCOSE BLDC GLUCOMTR-MCNC: 192 MG/DL (ref 70–99)
GLUCOSE BLDC GLUCOMTR-MCNC: 262 MG/DL (ref 70–99)
GLUCOSE BLDC GLUCOMTR-MCNC: 94 MG/DL (ref 70–99)
GLUCOSE BLDC GLUCOMTR-MCNC: 96 MG/DL (ref 70–99)
GLUCOSE SERPL-MCNC: 83 MG/DL (ref 70–99)
HCT VFR BLD AUTO: 32.2 % (ref 40–53)
HEMOCCULT STL QL: NEGATIVE
HGB BLD-MCNC: 9.9 G/DL (ref 13.3–17.7)
INR PPP: 1.34 (ref 0.86–1.14)
IRON SATN MFR SERPL: 13 % (ref 15–46)
IRON SERPL-MCNC: 35 UG/DL (ref 35–180)
LDH SERPL L TO P-CCNC: 147 U/L (ref 85–227)
MAGNESIUM SERPL-MCNC: 2.4 MG/DL (ref 1.6–2.3)
MCH RBC QN AUTO: 28.6 PG (ref 26.5–33)
MCHC RBC AUTO-ENTMCNC: 30.7 G/DL (ref 31.5–36.5)
MCV RBC AUTO: 93 FL (ref 78–100)
PLATELET # BLD AUTO: 251 10E9/L (ref 150–450)
POTASSIUM SERPL-SCNC: 4.1 MMOL/L (ref 3.4–5.3)
RBC # BLD AUTO: 3.46 10E12/L (ref 4.4–5.9)
SODIUM SERPL-SCNC: 139 MMOL/L (ref 133–144)
SPECIMEN EXP DATE BLD: NORMAL
T4 FREE SERPL-MCNC: 1.05 NG/DL (ref 0.76–1.46)
TIBC SERPL-MCNC: 272 UG/DL (ref 240–430)
TRANSFERRIN SERPL-MCNC: 216 MG/DL (ref 210–360)
TSH SERPL DL<=0.005 MIU/L-ACNC: 6.91 MU/L (ref 0.4–4)
URATE SERPL-MCNC: 4.4 MG/DL (ref 3.5–7.2)
VIT B12 SERPL-MCNC: 1143 PG/ML (ref 193–986)
WBC # BLD AUTO: 7 10E9/L (ref 4–11)

## 2018-07-23 PROCEDURE — 40000133 ZZH STATISTIC OT WARD VISIT

## 2018-07-23 PROCEDURE — 86147 CARDIOLIPIN ANTIBODY EA IG: CPT | Performed by: INTERNAL MEDICINE

## 2018-07-23 PROCEDURE — 84466 ASSAY OF TRANSFERRIN: CPT | Performed by: INTERNAL MEDICINE

## 2018-07-23 PROCEDURE — 97110 THERAPEUTIC EXERCISES: CPT | Mod: GO

## 2018-07-23 PROCEDURE — 25000132 ZZH RX MED GY IP 250 OP 250 PS 637: Performed by: STUDENT IN AN ORGANIZED HEALTH CARE EDUCATION/TRAINING PROGRAM

## 2018-07-23 PROCEDURE — 00000401 ZZHCL STATISTIC THROMBIN TIME NC: Performed by: INTERNAL MEDICINE

## 2018-07-23 PROCEDURE — 84443 ASSAY THYROID STIM HORMONE: CPT | Performed by: INTERNAL MEDICINE

## 2018-07-23 PROCEDURE — 82728 ASSAY OF FERRITIN: CPT | Performed by: INTERNAL MEDICINE

## 2018-07-23 PROCEDURE — 86901 BLOOD TYPING SEROLOGIC RH(D): CPT | Performed by: INTERNAL MEDICINE

## 2018-07-23 PROCEDURE — 85027 COMPLETE CBC AUTOMATED: CPT | Performed by: INTERNAL MEDICINE

## 2018-07-23 PROCEDURE — 86140 C-REACTIVE PROTEIN: CPT | Performed by: INTERNAL MEDICINE

## 2018-07-23 PROCEDURE — 86850 RBC ANTIBODY SCREEN: CPT | Performed by: INTERNAL MEDICINE

## 2018-07-23 PROCEDURE — 99233 SBSQ HOSP IP/OBS HIGH 50: CPT | Mod: GC | Performed by: INTERNAL MEDICINE

## 2018-07-23 PROCEDURE — 85730 THROMBOPLASTIN TIME PARTIAL: CPT | Performed by: INTERNAL MEDICINE

## 2018-07-23 PROCEDURE — 83540 ASSAY OF IRON: CPT | Performed by: INTERNAL MEDICINE

## 2018-07-23 PROCEDURE — 21400006 ZZH R&B CCU INTERMEDIATE UMMC

## 2018-07-23 PROCEDURE — 84550 ASSAY OF BLOOD/URIC ACID: CPT | Performed by: INTERNAL MEDICINE

## 2018-07-23 PROCEDURE — 85610 PROTHROMBIN TIME: CPT | Performed by: INTERNAL MEDICINE

## 2018-07-23 PROCEDURE — 80048 BASIC METABOLIC PNL TOTAL CA: CPT | Performed by: INTERNAL MEDICINE

## 2018-07-23 PROCEDURE — 25000132 ZZH RX MED GY IP 250 OP 250 PS 637: Performed by: INTERNAL MEDICINE

## 2018-07-23 PROCEDURE — 83735 ASSAY OF MAGNESIUM: CPT | Performed by: INTERNAL MEDICINE

## 2018-07-23 PROCEDURE — 83615 LACTATE (LD) (LDH) ENZYME: CPT | Performed by: INTERNAL MEDICINE

## 2018-07-23 PROCEDURE — 84439 ASSAY OF FREE THYROXINE: CPT | Performed by: INTERNAL MEDICINE

## 2018-07-23 PROCEDURE — 94726 PLETHYSMOGRAPHY LUNG VOLUMES: CPT | Mod: ZF

## 2018-07-23 PROCEDURE — 94729 DIFFUSING CAPACITY: CPT | Mod: ZF

## 2018-07-23 PROCEDURE — 82607 VITAMIN B-12: CPT | Performed by: INTERNAL MEDICINE

## 2018-07-23 PROCEDURE — 94375 RESPIRATORY FLOW VOLUME LOOP: CPT | Mod: ZF

## 2018-07-23 PROCEDURE — 85613 RUSSELL VIPER VENOM DILUTED: CPT | Performed by: INTERNAL MEDICINE

## 2018-07-23 PROCEDURE — 00000146 ZZHCL STATISTIC GLUCOSE BY METER IP

## 2018-07-23 PROCEDURE — 86900 BLOOD TYPING SEROLOGIC ABO: CPT | Performed by: INTERNAL MEDICINE

## 2018-07-23 PROCEDURE — 82610 CYSTATIN C: CPT | Performed by: INTERNAL MEDICINE

## 2018-07-23 PROCEDURE — 36592 COLLECT BLOOD FROM PICC: CPT | Performed by: INTERNAL MEDICINE

## 2018-07-23 PROCEDURE — 82272 OCCULT BLD FECES 1-3 TESTS: CPT | Performed by: INTERNAL MEDICINE

## 2018-07-23 PROCEDURE — 83550 IRON BINDING TEST: CPT | Performed by: INTERNAL MEDICINE

## 2018-07-23 RX ORDER — WARFARIN SODIUM 10 MG/1
10 TABLET ORAL
Status: COMPLETED | OUTPATIENT
Start: 2018-07-23 | End: 2018-07-23

## 2018-07-23 RX ADMIN — HYDRALAZINE HYDROCHLORIDE 50 MG: 50 TABLET ORAL at 09:46

## 2018-07-23 RX ADMIN — ASPIRIN 81 MG CHEWABLE TABLET 81 MG: 81 TABLET CHEWABLE at 09:46

## 2018-07-23 RX ADMIN — ISOSORBIDE MONONITRATE 30 MG: 30 TABLET, EXTENDED RELEASE ORAL at 09:47

## 2018-07-23 RX ADMIN — WARFARIN SODIUM 10 MG: 10 TABLET ORAL at 19:26

## 2018-07-23 RX ADMIN — CLOPIDOGREL 75 MG: 75 TABLET, FILM COATED ORAL at 09:46

## 2018-07-23 RX ADMIN — Medication 12.5 MG: at 09:47

## 2018-07-23 RX ADMIN — DIGOXIN 125 MCG: 125 TABLET ORAL at 09:46

## 2018-07-23 RX ADMIN — HYDRALAZINE HYDROCHLORIDE 50 MG: 50 TABLET ORAL at 21:48

## 2018-07-23 RX ADMIN — HYDRALAZINE HYDROCHLORIDE 50 MG: 50 TABLET ORAL at 13:46

## 2018-07-23 RX ADMIN — AMIODARONE HYDROCHLORIDE 200 MG: 200 TABLET ORAL at 09:46

## 2018-07-23 RX ADMIN — FAMOTIDINE 20 MG: 20 TABLET ORAL at 09:46

## 2018-07-23 RX ADMIN — ROSUVASTATIN CALCIUM 20 MG: 20 TABLET, FILM COATED ORAL at 21:48

## 2018-07-23 ASSESSMENT — ACTIVITIES OF DAILY LIVING (ADL)
ADLS_ACUITY_SCORE: 9

## 2018-07-23 NOTE — PROGRESS NOTES
D: Pt admitted 7/16 with cardiogenic shock & VT S/p ICD. Now having LVAD w/u    I: Monitored vitals and assessed pt status.   Changed: n/a  Running: n/a - R TL PICC + R PIV SL  PRN: n/a    A: A0x4. VSS on RA - chronically low BPs. Afebrile. BBB. Urinating adequately. 2000 mL fluid restriction. Good appetite. Pt has old R groin site from [facility] a week ago - CDI with pressure tape and guaze covering it. Up independently in room and tolerating well. Blood sugar check + carb count - BSs have been good. Pt resting comfortably in bed. Anticipating discharge tomorrow.     P: Continue to monitor Pt status and report changes to treatment team.

## 2018-07-23 NOTE — PROGRESS NOTES
Diabetes Consult Daily  Progress Note          Assessment/Plan:     Mr. Mariusz Sharp (Pepe) is a 58 yo man with a history of uncontrolled type 2 diabetes, recent STEMI on 6/27/18 s/p PCI, and CKD stage 3, who was readmitted on 7/2 to [facility] in [place] with VT and developed cardiogenic shock, transferred to [facility] on 7/16/18 for further management of heart failure.    BG in the 80s-90s overnight.  Anticipate that insulin needs may continue to decrease as pt's physical activity increases.  Anticipate high BG pre-lunch today b/c pt missed aspart at bfast.    Plan:  -Glargine: decreased from 25 to 20 units qPM  -aspart for meals and snacks: continue 1unit/7g CHO  -aspart for correction: continue high intensity ac and hs  -monitor glucoses ac, hs and 0200    If pt has coverage for Victoza then we would plan on discharging pt on glargine and Victoza (likely no aspart).  If he does not have coverage for Victoza he is comfortable with using insulin to carb ratio and sliding scale to dose aspart.       Outpatient diabetes follow up: with PCP in [place] within 1-2 weeks of discharge.  Plan discussed with patient and bedside RN.           Interval History:     The last 24 hours progress and nursing notes reviewed.  Red is feeling fine today.  Creatinine hovering around 1.7 today and yesterday.  He will have PFTs today, then meeting with cardiology team and his family.  He is hoping his brother will bring his new insurance card to meeting so we can run test claim for Victoza.  He reports that aim is for discharge home Tuesday.    This morning Red requested aspart for his bfast (he told me he new he needed 10 units for his 72g CHO)- but was told this was not ordered.  I reviewed with pt and RN that this is still in active orders.  It is too late to cover bfast now (consumed >1h ago), anticipate higher glucose at lunch.      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      PTA Regimen:  Prior to STEMI on 6/26- no medications, was monitoring his carb intake a little.  Upon discharge from STEMI admission: glargine 50 units (only home 2 days before readmitted)      Recent Labs  Lab 07/23/18  0728 07/23/18  0645 07/23/18  0151 07/22/18  2138 07/22/18  1704 07/22/18  1201 07/22/18  0719 07/22/18  0551  07/21/18  0649  07/20/18  0403  07/19/18  0414  07/18/18  0350   GLC  --  83  --   --   --   --   --  166*  --  125*  --  124*  --  157*  --  238*   BGM 96  --  94 134* 204* 101* 160*  --   < >  --   < >  --   < >  --   < >  --    < > = values in this interval not displayed.            Review of Systems:   See interval hx          Medications:       Active Diet Order      Combination Diet [id number] Calories: Moderate Consistent CHO (4-6 CHO units/meal); 2 gm NA Diet     Physical Exam:  Gen: Alert, resting in bed, in NAD   HEENT: NC/AT, mucous membranes are moist  Resp: Unlabored  Neuro:oriented x3, communicating clearly  /77 (BP Location: Left arm)  Pulse 81  Temp 96.9  F (36.1  C) (Axillary)  Resp 16  Ht 1.626 m (5' 4\")  Wt 77.5 kg (170 lb 14.4 oz)  SpO2 97%  BMI 29.33 kg/m2           Data:     Lab Results   Component Value Date    A1C 11.9 07/17/2018    A1C 12.3 07/16/2018            Recent Labs   Lab Test  07/23/18   0645  07/22/18   0551   NA  139  139   POTASSIUM  4.1  4.1   CHLORIDE  109  106   CO2  22  22   ANIONGAP  8  11   GLC  83  166*   BUN  51*  54*   CR  1.74*  1.67*   ARLENE  8.8  8.6     CBC RESULTS:   Recent Labs   Lab Test  07/23/18   0645   WBC  7.0   RBC  3.46*   HGB  9.9*   HCT  32.2*   MCV  93   MCH  28.6   MCHC  30.7*   RDW  14.7   PLT  251       Melody Gutierrez PA-C [phone]  Diabetes Management job code [id number]          "

## 2018-07-23 NOTE — PROVIDER NOTIFICATION
Notified crosscover of systolic BPs 87, 82, 80 throughout the night, MAPs 62-65. Pt asymptomatic. OVSS. Instructed to continue monitor.

## 2018-07-23 NOTE — PROGRESS NOTES
"Met with patient, brother Romeo, and friends Ryle and Lisa to present the Heartmate II, Heartmate III and Heartware  VAD(s) as possible treatment options.     Discussed patient and caregiver responsibilities before and after VAD implant.  Clarified the need for a caregiver to be present for education and to assist patient for at least first 30 days after a patient returns home. Patient's designated caregivers are Lisa, Romeo and Ryle.     Discussed basic overview of VAD equipment, on going management, need for anticoagulation, regular dressing change, grounded three-pronged outlet and functioning telephone. Also discussed frequency of follow-up clinic appointments and the need to stay locally for at least 2-4 weeks.  Reviewed restrictions of having a VAD such as no swimming, bathing, boats, MRI's, or arc welding. Notified pt of the May 2018 Heartmate 3 Outflow Graft Recall with the patient. Encouraged pt to speak with physician with any questions.     Provided and discussed the VAD educational brochures, information regarding the VAD and transplant support groups, information on INTERMACs registry, and \"VAD What You Should Know\" with additional details. Patient and Lisa signed \"VAD What You Should Know\" document. VAD coordinator contact information provided.  Encouraged patient, brother and friends to call with questions.   "

## 2018-07-23 NOTE — CONSULTS
"Diabetes Education  Received consult request to see this 57 year old male; reason for consult states \"uncontrolled diabetes\".  An endocrine consult was subsequently ordered.  Patient was diagnosed with diabetes 2 years ago.  He was started on insulin glargine following his recent STEMI, and took one dose at home, then was readmitted to the hospital.  He has assisted his father with insulin administration.  Per endocrine notes, plan is for patient to discharge on glargine and aspart, or glargine and Victoza.  Will sign off; no education needs at this point.  Patient may benefit from outpatient diabetes education.  Vero Portillo MS RN CDE [facility]  [phone]      "

## 2018-07-23 NOTE — PLAN OF CARE
Problem: Patient Care Overview  Goal: Plan of Care/Patient Progress Review  Outcome: No Change    D: admitted 7/16 for cardiogenic shock and VT s/p DCCV, hx STEMI 6/2018 s/p PCI, DM2, CKD3     I/A:   Neuro- A&Ox4, independent  CV- SR 70s with BBB, BP 80-100s/50s (MAPs 62-65) systolic parameter 85, team notified, instructed to monitor.  Pulm- sating well on RA, crackles in bases, encouraged IS.  GI/- consistent CHO, 2 g Na diet. BM x1, voiding adequaely.   LDA- R TL PICC, R PIV   Skin- no new deficits  Pain- denies     P:  PFTs scheduled for 10:45 AM in Rehab Room on 6B. Continue LVAD work-up. LVAD meeting scheduled for 7/23. Continue to monitor and notify CARDS 2 with any changes or concerns.

## 2018-07-23 NOTE — PLAN OF CARE
Problem: Patient Care Overview  Goal: Plan of Care/Patient Progress Review  Outcome: No Change  No acute changes during evening. Ambulated in  hallway once with slight fatigue. VSS. RA, crackles in posterior bases, pulmonary toilet encouraged. Voiding in urinal independently. Awaiting BM sample. 2L fluid restriction.     Plan: continuing LVAD workup. continue plan of care.

## 2018-07-23 NOTE — CONSULTS
[facility], [place]  Palliative Care Consultation Note    Patient: Mariusz Sharp  Date of Admission:  7/16/2018    Requesting provider/team: Lew II  Reason for consult: LVAD Preparedness planning     LVAD preparedness plan:  Patient s legally designated health care agent: does not have a HCD, but would want his oldest son to make decisions for him, if he were not able to speak for himself     Patient s description of how they make decisions (by themselves, in consultation with certain close loved ones, based on advice from medical professionals, etc):  Typically alone and is quite decisive    Patient s personal goals/hopes for receiving the LVAD: to have more quality time with his grandchildren    Patient s worries/concerns when considering receiving the LVAD: worries about going through open heart surgery; also thinks the VAD might slow him down.     How does the patient envision or anticipate his/her future with the LVAD? Believes if he does have to get the LVAD (remains hopeful he won't need it), that he will have good support from his ex-fiance and then will adapt and become fairly independent again.   Patient s thoughts about what constitutes a  good  quality of life: having his mind intact and being able to get around    Patient s thoughts about what health conditions would be unacceptable (situations the patient would want her/his doctors and loved ones to know that she/he would not want life prolonged)? Would not want life prolonged if he were not able to get out of bed every day and interact with people around him    LVADs carry a risk of stroke which can be impairing. After a stroke, what sort of functional outcomes would be acceptable vs unacceptable to the patient? Would be fine adjusting to physical disabilities (i.e. using a wheelchair) but would not find it acceptable if he wasn't able to communicate with others or was bed bound.      Chronic mechanical ventilation via  a tracheostomy tube is a risk. What are the circumstances the patient would want her/his physicians and family to keep them alive with long-term mechanical ventilation vs allow them to die?  Knows he would never want a feeding tube. Is hesitant to agree to a trach, because he knows he would not want one long term and fears that once it's placed it would be difficult for his family to remove it. Ultimately thinks he would be okay with a trach temporarily, for rehab.     These recommendations have been discussed with Cards II.    Thank you for the opportunity to participate in the care of this patient and family. No needs identified requiring follow up, so our team will sign off at this time. Please feel free to contact the on-call palliative provider with any urgent needs and re-consult us in the future if we can be helpful.     CARMEN Quintanilla CNP  Palliative Care Consult Team  Pager: [phone]    [facility] Inpatient Team Consult pager [phone] (M-F 8-4:30)  After-hours Answering Service [phone]   Palliative Clinic: [phone]     75 minutes spent with patient, with >50% counseling and in care coordination.    Assessment:  Mariusz Sharp is a 57 year old male with a past medical history significant for DM2, CKD, and recent STEMI 18 s/p PCI, being evaluated for advanced heart failure therapies.     Social:         Living situation: Lives with 81 y/o father who has dementia, whom he helps to take care of       Support system: Patient's designated caregivers are Lisa, Romeo and Ryle.        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        Hobbies: Enjoys mountain biking and walking. Loves  to fish and stay active.    Advance Care Planning:               Decision making capacity: intact       Disease understanding: good insight       Goals of Care: to live longer, as long as he has a good mind        Health care directive: not on file        Health care agent: next of kin is adult children        Code Status:  Full Code       POLST: not on file     History of Present Illness   Sources of History: patient and electronic health record    Mariusz Sharp (Pepe) is a 57 year old male with a history of uncontrolled type 2 diabetes, recent STEMI on 6/27/18 s/p PCI, and CKD stage 3. He was readmitted on 7/2 to  in [place] with VT.  TTE (7/5) showed LVEF 15%, with multiple wall motion abnormalities, likely ischemic. The patient developed cardiogenic shock on 7/11, transferred to [facility] on 7/16/18 for further management of heart failure.        Past Medical History:   No past medical history on file.       Past Surgical History:   No past surgical history on file.          Family History:   No family history on file.         Allergies:   No Known Allergies         Medications:   I have reviewed this patient's medication profile and medications given in the past 24 hours.           Physical Exam:   Vital Signs: Temp: 98  F (36.7  C) Temp src: Oral BP: 112/78 Pulse: 81 Heart Rate: 73 Resp: 16 SpO2: 95 % O2 Device: None (Room air)    Weight: 170 lbs 14.4 oz    Constitutional: Pleasant male, seen sitting on the edge of bed, in no acute distress.  Head: Normocephalic. Atraumatic. MMM.   Pulm: Non-labored breathing, at rest. Speaking in complete sentences.    Musculoskeletal: BASS. Strength 5/5. No obvious malformations.   Skin: No jaundice.   Neuro: A/O x 4. Face symmetrical.   Psych: Speech clear. Affect appropriate to situation. Memory and insight intact.       Data reviewed:     CMP  Recent Labs  Lab 07/24/18  0604 07/23/18  0645 07/22/18  0551 07/21/18  0649  07/19/18  0414  07/17/18  1215    139 139 140   < > 138  < >  --    POTASSIUM 4.4 4.1 4.1 3.8  < > 3.6  < > 4.0   CHLORIDE 110* 109 106 104  < > 98  < >  --    CO2 22 22 22 26  < > 31  < >  --    ANIONGAP 8 8 11 9  < > 9  < >  --    * 83 166* 125*  < > 157*  < >  --    BUN 50* 51* 54* 59*  < > 72*  < >  --    CR 1.71* 1.74* 1.67* 1.83*  < > 1.83*  < >  --    GFRESTIMATED 42* 41* 43* 39*  < > 39*  < >  --    GFRESTBLACK 50* 49* 52* 47*  < > 47*  < >  --    ARLENE 8.8 8.8 8.6 8.7  < > 8.8  < >  --    MAG 2.1 2.4* 2.0 2.2  < >  --   --  2.4*   PHOS  --   --   --   --   --   --   --  5.4*   PROTTOTAL  --   --   --   --   --  7.1  --   --    ALBUMIN  --   --   --   --   --  2.6*  --   --    BILITOTAL  --   --   --   --   --  1.0  --   --    ALKPHOS  --   --   --   --   --  121  --   --    AST  --   --   --   --   --  39  --   --    ALT  --   --   --   --   --  88*  --   --    < > = values in this interval not displayed.  CBC  Recent Labs  Lab 07/24/18  0604 07/23/18  0645 07/22/18  0551 07/21/18  0748   WBC 6.1 7.0 6.9 8.0   RBC 3.45* 3.46* 3.36* 3.47*   HGB 9.9* 9.9* 9.6* 9.9*   HCT 32.6* 32.2* 31.5* 32.1*   MCV 95 93 94 93   MCH 28.7 28.6 28.6 28.5   MCHC 30.4* 30.7* 30.5* 30.8*   RDW 14.9 14.7 14.3 14.0    251 221 205     INR  Recent Labs  Lab 07/24/18  0604 07/23/18  0645 07/22/18  0551 07/21/18  0649   INR 1.78* 1.34* 1.22* 1.23*

## 2018-07-23 NOTE — PROGRESS NOTES
RN assumed care at 1500. Patient presents with cardiogenic shock admitted 7/16/18 for LVAD workup. Vitals stable. No complaints or concerns. LVAD mtg scheduled Monday. Will continue to monitor and notify Cards 2 of any changes.

## 2018-07-23 NOTE — PLAN OF CARE
Problem: Patient Care Overview  Goal: Plan of Care/Patient Progress Review  Discharge Planner OT   Patient plan for discharge: Home  Current status: Ind with functional transfers including bed mobility and sit <> stands without assistive device. Completed ~350 ft x 2 to rehab gym independently. Assessed balance during gait, pt with mild balance deficits, no significant LOB or risk of falls. Educated pt on safety recommendations, pt receptive to all education. Completed x 20.5 minutes on Nu-step LV 3-4 with x 1 rest break. VS pre activity: HR 73-74, SpO2 97% on room air, BP 95/68 (76). VS post activity: HR 79-82, SpO2 97-98% on room air, BP checked during and post activity, 94/58 (67) and 98/56 (67) respectively.   Barriers to return to prior living situation: medical stability, deconditioning  Recommendations for discharge: Home with assist as needed and HEP  Rationale for recommendations: Pt is near functional baseline, demonstrates safety and independence with ADLs/IADLs. Continues to be limited by deconditioning and weakness, recommend pt continue with HEP at discharge to address remaining deficits.       Entered by: China Riojas 07/23/2018 9:54 AM

## 2018-07-24 ENCOUNTER — APPOINTMENT (OUTPATIENT)
Dept: ULTRASOUND IMAGING | Facility: CLINIC | Age: 58
DRG: 270 | End: 2018-07-24
Attending: INTERNAL MEDICINE
Payer: COMMERCIAL

## 2018-07-24 VITALS
OXYGEN SATURATION: 97 % | WEIGHT: 169.8 LBS | SYSTOLIC BLOOD PRESSURE: 96 MMHG | BODY MASS INDEX: 28.99 KG/M2 | DIASTOLIC BLOOD PRESSURE: 70 MMHG | TEMPERATURE: 97.9 F | HEIGHT: 64 IN | HEART RATE: 81 BPM | RESPIRATION RATE: 16 BRPM

## 2018-07-24 LAB
ANION GAP SERPL CALCULATED.3IONS-SCNC: 8 MMOL/L (ref 3–14)
BUN SERPL-MCNC: 50 MG/DL (ref 7–30)
CALCIUM SERPL-MCNC: 8.8 MG/DL (ref 8.5–10.1)
CHLORIDE SERPL-SCNC: 110 MMOL/L (ref 94–109)
CO2 SERPL-SCNC: 22 MMOL/L (ref 20–32)
CREAT SERPL-MCNC: 1.71 MG/DL (ref 0.66–1.25)
DIGOXIN SERPL-MCNC: 0.8 UG/L (ref 0.5–2)
ERYTHROCYTE [DISTWIDTH] IN BLOOD BY AUTOMATED COUNT: 14.9 % (ref 10–15)
GFR SERPL CREATININE-BSD FRML MDRD: 42 ML/MIN/1.7M2
GLUCOSE BLDC GLUCOMTR-MCNC: 72 MG/DL (ref 70–99)
GLUCOSE SERPL-MCNC: 116 MG/DL (ref 70–99)
HCT VFR BLD AUTO: 32.6 % (ref 40–53)
HGB BLD-MCNC: 9.9 G/DL (ref 13.3–17.7)
HLA TYPING COMPLETE SOT RECIPIENT: NORMAL
INR PPP: 1.78 (ref 0.86–1.14)
LA PPP-IMP: NEGATIVE
LAB SCANNED RESULT: ABNORMAL
MAGNESIUM SERPL-MCNC: 2.1 MG/DL (ref 1.6–2.3)
MCH RBC QN AUTO: 28.7 PG (ref 26.5–33)
MCHC RBC AUTO-ENTMCNC: 30.4 G/DL (ref 31.5–36.5)
MCV RBC AUTO: 95 FL (ref 78–100)
PLATELET # BLD AUTO: 275 10E9/L (ref 150–450)
POTASSIUM SERPL-SCNC: 4.4 MMOL/L (ref 3.4–5.3)
PRA SINGLE ANTIGEN IGG ANTIBODY: NORMAL
RBC # BLD AUTO: 3.45 10E12/L (ref 4.4–5.9)
SODIUM SERPL-SCNC: 141 MMOL/L (ref 133–144)
WBC # BLD AUTO: 6.1 10E9/L (ref 4–11)

## 2018-07-24 PROCEDURE — 80162 ASSAY OF DIGOXIN TOTAL: CPT | Performed by: INTERNAL MEDICINE

## 2018-07-24 PROCEDURE — 80048 BASIC METABOLIC PNL TOTAL CA: CPT | Performed by: INTERNAL MEDICINE

## 2018-07-24 PROCEDURE — 83735 ASSAY OF MAGNESIUM: CPT | Performed by: INTERNAL MEDICINE

## 2018-07-24 PROCEDURE — 93922 UPR/L XTREMITY ART 2 LEVELS: CPT

## 2018-07-24 PROCEDURE — 25000132 ZZH RX MED GY IP 250 OP 250 PS 637: Performed by: STUDENT IN AN ORGANIZED HEALTH CARE EDUCATION/TRAINING PROGRAM

## 2018-07-24 PROCEDURE — 85610 PROTHROMBIN TIME: CPT | Performed by: INTERNAL MEDICINE

## 2018-07-24 PROCEDURE — 40000802 ZZH SITE CHECK

## 2018-07-24 PROCEDURE — 36592 COLLECT BLOOD FROM PICC: CPT | Performed by: INTERNAL MEDICINE

## 2018-07-24 PROCEDURE — 25000132 ZZH RX MED GY IP 250 OP 250 PS 637: Performed by: INTERNAL MEDICINE

## 2018-07-24 PROCEDURE — 85027 COMPLETE CBC AUTOMATED: CPT | Performed by: INTERNAL MEDICINE

## 2018-07-24 PROCEDURE — 00000146 ZZHCL STATISTIC GLUCOSE BY METER IP

## 2018-07-24 PROCEDURE — 99239 HOSP IP/OBS DSCHRG MGMT >30: CPT | Mod: GC | Performed by: INTERNAL MEDICINE

## 2018-07-24 PROCEDURE — 99223 1ST HOSP IP/OBS HIGH 75: CPT | Performed by: NURSE PRACTITIONER

## 2018-07-24 RX ORDER — HYDRALAZINE HYDROCHLORIDE 50 MG/1
50 TABLET, FILM COATED ORAL 3 TIMES DAILY
Qty: 120 TABLET | Refills: 11 | Status: ON HOLD | OUTPATIENT
Start: 2018-07-24 | End: 2019-01-09

## 2018-07-24 RX ORDER — ROSUVASTATIN CALCIUM 20 MG/1
20 TABLET, COATED ORAL AT BEDTIME
Qty: 30 TABLET | Refills: 11 | Status: ON HOLD | OUTPATIENT
Start: 2018-07-24 | End: 2019-01-15

## 2018-07-24 RX ORDER — METOPROLOL SUCCINATE 25 MG/1
12.5 TABLET, EXTENDED RELEASE ORAL DAILY
Qty: 30 TABLET | Refills: 11 | Status: SHIPPED | OUTPATIENT
Start: 2018-07-25 | End: 2018-08-06

## 2018-07-24 RX ORDER — ISOSORBIDE MONONITRATE 30 MG/1
30 TABLET, EXTENDED RELEASE ORAL DAILY
Qty: 30 TABLET | Refills: 11 | Status: ON HOLD | OUTPATIENT
Start: 2018-07-25 | End: 2019-01-09

## 2018-07-24 RX ORDER — AMIODARONE HYDROCHLORIDE 200 MG/1
200 TABLET ORAL DAILY
Qty: 60 TABLET | Refills: 11 | Status: ON HOLD | OUTPATIENT
Start: 2018-07-25 | End: 2019-01-15

## 2018-07-24 RX ORDER — DIGOXIN 125 MCG
125 TABLET ORAL DAILY
Qty: 30 TABLET | Refills: 11 | Status: ON HOLD | OUTPATIENT
Start: 2018-07-25 | End: 2019-01-15

## 2018-07-24 RX ADMIN — HYDRALAZINE HYDROCHLORIDE 50 MG: 50 TABLET ORAL at 14:16

## 2018-07-24 RX ADMIN — ISOSORBIDE MONONITRATE 30 MG: 30 TABLET, EXTENDED RELEASE ORAL at 08:03

## 2018-07-24 RX ADMIN — ASPIRIN 81 MG CHEWABLE TABLET 81 MG: 81 TABLET CHEWABLE at 08:02

## 2018-07-24 RX ADMIN — DIGOXIN 125 MCG: 125 TABLET ORAL at 08:02

## 2018-07-24 RX ADMIN — HYDRALAZINE HYDROCHLORIDE 50 MG: 50 TABLET ORAL at 08:02

## 2018-07-24 RX ADMIN — FAMOTIDINE 20 MG: 20 TABLET ORAL at 08:01

## 2018-07-24 RX ADMIN — AMIODARONE HYDROCHLORIDE 200 MG: 200 TABLET ORAL at 08:02

## 2018-07-24 RX ADMIN — Medication 12.5 MG: at 08:02

## 2018-07-24 RX ADMIN — CLOPIDOGREL 75 MG: 75 TABLET, FILM COATED ORAL at 08:02

## 2018-07-24 ASSESSMENT — ACTIVITIES OF DAILY LIVING (ADL)
ADLS_ACUITY_SCORE: 9

## 2018-07-24 NOTE — PLAN OF CARE
Problem: Patient Care Overview  Goal: Plan of Care/Patient Progress Review  Occupational Therapy Discharge Summary    Reason for therapy discharge:    Discharged to home.    Progress towards therapy goal(s). See goals on Care Plan in [facility] electronic health record for goal details.  Goals partially met.  Barriers to achieving goals:   limited tolerance for therapy and unable to address all goals.    Therapy recommendation(s):    Continued therapy is recommended.  Rationale/Recommendations:  OP CR phase 2.

## 2018-07-24 NOTE — PROGRESS NOTES
CLINICAL NUTRITION SERVICES    Reason for Assessment:  Nutrition education regarding heart failure pre VAD planning    Diet History:  Pt admits to not receiving formal nutrition education on meal planning pre- LVAD placement in the past.      Nutrition Diagnosis:  Food- and nutrition-related knowledge deficit r/t limited knowledge of meal planning post-op LVAD placement AEB pt report of not receiving formal nutrition education on meal planning post-op LVAD placement in the past.     Interventions:  Nutrition education for nutrition relationship to health/disease: Discussed nutrition education for potential upcoming LVAD surgery (pt in the LVAD workup process). Mentioned possible need for feeding tube and TFs for nutrition support post-op. Explained the importance of adequate oral intake pre-op and post-op. Emphasized protein intake and gave examples of high protein foods/beverages. Discussed and encouraged oral supplements post-op (pt most interested in chocolate or strawberry Boost Glucose Control). Rec continue low-sodium diet (pt has a booklet of nutrition education) and fluid restriction (Ppovided written nutrition handout on Managing Your Fluid Intake) at this time. Pt with no questions on current diet order. Offered carbohydrate counting education and pt declined as he has had nutrition education in the past (Hgb A1c of 12.3 on 7/17/18). Endo team is following. Encouraged good glycemic control, low-sodium diet, and fluid restriction pre-op. Pt seemed agreeable to nutrition plan if LVAD is placed in the future.      Goals:   1. Patient will verbalize the importance of eating small, frequent meals post-op  2. Pt will verbalize at least three high protein foods.     Follow-up:    Patient to ask any further nutrition-related questions before discharge. In addition, pt may request outpatient RD appointment.    Dorcas Meek, MS, RD, LD, [facility]   6C Pgr:  [phone]

## 2018-07-24 NOTE — DISCHARGE INSTRUCTIONS
Diabetes Plan::  -Glargine: continue 20 units at bedtime ( lantus/glargine/basaglar, are all the same just different names)  -aspart ( Novolog) for meals and snacks:  1 unit per 12 grams of CHO  -Aspart ( Novolog)sliding scale  I   -179 give 1 unit.   -219 give 2 units.   -259 give 3 units.   -299 give 4 units.   -339 give 5 units.   BG >/= 340 give 6 units.     Do Not give Bedtime Correction Insulin if BG less than 200   -239 give 1 unit.   -279 give 2 units.   -319 give 3 units.   -359 give 4 units.   BG >/=360 give  5 units.       -test glucse before meals, bedtime and at 0200 ( if awake)

## 2018-07-24 NOTE — CONSULTS
"Mariusz is a 57 year old male transferred to [facility] for futher management of cardiogenic shock with LVEF 15% . CORE consult requested. Mariusz  is currently admitted with HFrEF.    Mariusz was provided with a CHF book.  I reviewed the importance of daily weights, 2 gm sodium diet, 2L fluid restriction and compliance with medications upon hospital discharge.  CORE contact phone numbers provided and patient is encouraged to call with any questions or concerns, including any weight gain or loss of 2 or more pounds in 24 hours or 5 or more pounds in 1 week.      Appointment made in CORE clinic on  with Pippa Rodriguez NP  at 3:00 with labs at 2:30. Also scheduled with Dr. Jenni Ortiz on  at 3:00 with labs prior. Communicated this with patient.  I will follow-up with the patient at that time, sooner if needed.  Thank you for the consult.    Sasha Schwartz RN CHFN  Cardiology Care Coordinator - [facility]  Questions and [phone]  First press #1 for the [place] and then press #3 for \"Medical Advise\" to reach us Cardiology Nurses.    "

## 2018-07-24 NOTE — PLAN OF CARE
"Problem: Patient Care Overview  Goal: Plan of Care/Patient Progress Review  Outcome: Improving  BP (!) 86/57 (BP Location: Left arm)  Pulse 81  Temp 98.7  F (37.1  C) (Oral)  Resp 16  Ht 1.626 m (5' 4\")  Wt 77 kg (169 lb 12.8 oz)  SpO2 96%  BMI 29.15 kg/m2    D: Cardiogenic shock & VT s/p ICD. LVAD workup.  I/A: Monitored vitals and assessed pt status. A&O x4 VSS see flowsheet. SR HR 70-80 w/ BBB. Chronically low BPs. Denies pain. R groin site w/ pressure dressing. 2L fluid restriction. Voiding good amount in BR urinal. Up independently in room. Sleeping between cares.  P: Plan to d/c Tuesday. Continue to monitor and follow POC. Notify Cards 2 with changes.      "

## 2018-07-24 NOTE — PLAN OF CARE
Problem: Cardiac: Heart Failure (Adult)  Goal: Signs and Symptoms of Listed Potential Problems Will be Absent, Minimized or Managed (Cardiac: Heart Failure)  Signs and symptoms of listed potential problems will be absent, minimized or managed by discharge/transition of care (reference Cardiac: Heart Failure (Adult) CPG).   Outcome: Adequate for Discharge Date Met: 07/24/18  Discharge  Discharged to: Home  Via: Ambulatory  Accompanied by: Brother  Belongings: All belongings with patient including clothing, cell phone, , and battery. New meds to be picked up at home pharmacy. No valuables checked in security.   Teaching: Reviewed all discharge instructions with patient. Discussed medication management, diabetes care, activity restrictions, signs to watch for, and when to call the MD if needed. The patient has been instructed about HF mgmt after discharge and to review the written instructions provided on admission, during the hospital stay or on discharge. Instructed on Diet: A heart healthy diet ordered by MD; Activity: At the level ordered by MD; Weight Monitoring: Monitor the weight at least every other day or as ordered by MD; Worsening symptoms of HF: Monitor symptoms of worsening HF such as weight gain of 2# or more per day or 5# in a week, SOB, leg/ankle swelling, chest pain/ discomfort, decreased energy, fatigue or other symptoms as specified by MD; Call the MD when worsening symptom(s) occur; Medications: upon discharge; Follow-up: Review appointment(s) as directed by the MD; Smoking Cessation: Re-enforce cigarette smoking cessation and avoidance of cigarette smoke. Patient verbalized understanding of all discharge instructions.   Clinic appointment: Follow-up appts and INR monitoring discussed with patient who verbalized how to make or change if needed.   Report called/faxed: Discharge paperwork to be faxed to PMD.  Tele and IV: Removed.

## 2018-07-24 NOTE — PROGRESS NOTES
Diabetes Consult Daily  Progress Note          Assessment/Plan:        Mariusz Sharp (Pepe) is a 56 yo man with a history of uncontrolled type 2 diabetes, recent STEMI on 6/27/18 s/p PCI, and CKD stage 3, who was readmitted on 7/2 to [facility] in [place] with VT and developed cardiogenic shock, transferred to [facility] on 7/16/18 for further management of heart failure.                        Plan:  -Glargine: continue 20 units qPM  -aspart for meals and snacks: continue 1unit/7g CHO, decrease to 1 unit per 12 grams of CHO  - discontinue, aspart for correction: continue high intensity ac and hs  -Aspart 1 unit per 40 > 140 before meals and > 200 HS  ISF 40  1 per 40 >/= 140.   -179 give 1 unit.   -219 give 2 units.   -259 give 3 units.   -299 give 4 units.   -339 give 5 units.   BG >/= 340 give 6 units.     Do Not give Bedtime Correction Insulin if BG less than 200   -239 give 1 unit.   -279 give 2 units.   -319 give 3 units.   -359 give 4 units.   BG >/=360 give  5 units.       -monitor glucoses ac, hs and 0200      He does not have coverage for Victoza, will need prior authorization, he is comfortable with using insulin to carb ratio and sliding scale to dose aspart. Mr. Sharp is  restricted to a pharmacy in [place] to fill his prescriptions.  Discussed with RN, to send novolog and lantus pen to pharmacy to [facility] for home use.          Outpatient diabetes follow up: with PCP in [place] within 1-2 weeks of discharge.  Plan discussed with patient and primary team.             Interval History:   The last 24 hours progress and nursing notes reviewed.  Blood sugars are in good control, but tightly controled.  Glucose at HS, 192  Glucose not document at 0200  Am glucose 116  glucase before lunch 72 after receiving 1 unit per 7 grams of CHO for his meal.  Made adjustments in prandial and sliding scale.  Appetite is reported as good, denies nausea and or  "vomiting. Up independently in room.  Reviewed discharge plan, all questions were answered.         Recent Labs  Lab 07/24/18  1128 07/24/18  0604 07/23/18  2122 07/23/18  1819 07/23/18  1311 07/23/18  0728 07/23/18  0645 07/23/18  0151  07/22/18  0551  07/21/18  0649  07/20/18  0403  07/19/18  0414   GLC  --  116*  --   --   --   --  83  --   --  166*  --  125*  --  124*  --  157*   BGM 72  --  192* 262* 100* 96  --  94  < >  --   < >  --   < >  --   < >  --    < > = values in this interval not displayed.            Review of Systems:   See interval hx          Medications:       Active Diet Order      Combination Diet [id number] Calories: Moderate Consistent CHO (4-6 CHO units/meal); 2 gm NA Diet     Physical Exam:  Gen: Alert, NAD   HEENT:  mucous membranes are moist  Resp: non-labored  Ext: No lower extremity edema   Neuro:oriented x3, communicating clearly  BP 96/70 (BP Location: Left arm)  Pulse 81  Temp 97.9  F (36.6  C) (Oral)  Resp 16  Ht 1.626 m (5' 4\")  Wt 77 kg (169 lb 12.8 oz)  SpO2 97%  BMI 29.15 kg/m2           Data:     Lab Results   Component Value Date    A1C 11.9 07/17/2018    A1C 12.3 07/16/2018              CBC RESULTS:   Recent Labs   Lab Test  07/24/18   0604   WBC  6.1   RBC  3.45*   HGB  9.9*   HCT  32.6*   MCV  95   MCH  28.7   MCHC  30.4*   RDW  14.9   PLT  275     Recent Labs   Lab Test  07/24/18   0604  07/23/18   0645   NA  141  139   POTASSIUM  4.4  4.1   CHLORIDE  110*  109   CO2  22  22   ANIONGAP  8  8   GLC  116*  83   BUN  50*  51*   CR  1.71*  1.74*   ARLENE  8.8  8.8     Liver Function Studies -   Recent Labs   Lab Test  07/19/18   0414   PROTTOTAL  7.1   ALBUMIN  2.6*   BILITOTAL  1.0   ALKPHOS  121   AST  39   ALT  88*     Lab Results   Component Value Date    INR 1.78 07/24/2018    INR 1.34 07/23/2018    INR 1.22 07/22/2018    INR 1.23 07/21/2018    INR 1.35 07/20/2018    INR 1.18 07/19/2018    INR 1.23 07/17/2018    INR 1.22 07/16/2018           Shelley Vargas CNP " [phone]  Diabetes Management job code [id number]

## 2018-07-24 NOTE — CONSULTS
"Consult Date:  2018      NEUROPSYCHOLOGICAL EVALUATION      RELEVANT HISTORY AND REASON FOR REFERRAL:  Mariusz \"Red\" Aron is a 57-year-old  man with a history of recent STEMI, type 2 diabetes, and stage III CKD, rehospitalized in a local [place] facility for defibrillator firings and cardiogenic shock, with an EF of 15%.  He was transferred here on 2018 for further cardiac management, and to be considered for advanced treatments such as LVAD and heart transplant.  Neuropsychological evaluation is requested by the attending cardiologist, Dr. Jenni Ortiz, as a routine part of the LVAD/heart transplant evaluations, to assess mental health, cognition and lifestyle issues, as it pertains to his suitability for these interventions.      The younger of two boys, he was born in [place] and grew up there, reared by his parents.  His father was a , his mother a Department of .  Mr. Sharp finished high school and briefly studied auto mechanics and partially trained as a PCA training, without completing the programs.  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  For the last five years, he's worked full-time as a .  His only marriage ended in divorce.  They split up after 12 years and were  a year later.  He has two grown sons, ages 34 and 30, from that marriage.  Two other sons  with a genetically based form of multiple dystrophy that stifled lung development; one of these sons  shortly after birth, the other at age 24.  He also has a 14-year-old son with his former girlfriend.  Prior to this admission, he was living in [place] with his father, who has advanced Alzheimer disease (Mr. Sharp was his caretaker).      BEHAVIORAL OBSERVATIONS AND CLINICAL INTERVIEW FINDINGS:  I found him resting comfortably in his semi-private room.  He presents as a middle-aged man of short " "stature and somewhat heavy build, with shoulder length graying hair and a full beard and mustache.  He wore eyeglasses and was dressed in a hospital gown and colorful pajama bottoms and socks.  He was friendly and outgoing throughout, with a good grasp of his medical history.      Please refer to the cardiology notes for the details of the cardiac history, which developed very recently.  During this admission, EF improved with treatment and he's feeling comfortable.  He knows he might need an LVAD and/or heart transplant should his condition worsen, and is prepared for that, but he is hopes he'll remain stable for quite some time.  He was still in the early stages of learning about the LVAD.  His former girlfriend (the mother of his 14-year-old son) is on good terms with him and is willing to assist in his postsurgical care.  Several others were coming today to get a formal tutorial on the LVAD.      For the most part, he's enjoyed good health.  Diabetes was diagnosed within the last year or so while he was being treated for kidney stones.  He admits that he wasn't managing the diabetes as well as he should have, and convinced himself he could overcome it on his own, \"I thought I was a 10 feet tall and invincible.\"  He now appreciates the importance of careful diabetes management, and plans to check blood sugars before each meal.  He met with an endocrinologist and seems to understand the diabetic regimen.  He has been told the cardiac condition could be related to the diabetes.  He has not had other diabetic complications to his knowledge.      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  It was a brief loss of consciousness, associated with confusion and amnesia, but he made a good recovery.  He has not had stroke symptoms, cardiac arrests, CVAs or other diseases of the brain.  Earlier this month, his newly implanted defibrillator fired several times, while he was in " the hospital.      Psychiatric history is unremarkable.  He isn't prone to depression or anxiety and never received mental health treatment of any kind, including psychotropics. He tells me he has never used tobacco, alcohol or recreational drugs of any kind.      His only surgery was a hernia repair.      The family is discussing alternate care plans for his father, who has Alzheimer disease.  Mr. Sharp realizes he probably cannot continue caring for him given his own medical problems.  His mother, who also had a dementing disease, thought to be Alzheimer disease,  in .      We were able to find a quiet conference room for the testing portion of this evaluation.  He was cooperative, though he missed lunch prior to this 2:00 appointment.  He used a wheelchair to get to the testing room.  Speech was a bit fast.  He proceeded briskly.  He had no obvious trouble understanding or retaining directives and seemed to work to the best of his abilities.      NEUROPSYCHOLOGICAL FINDINGS:  Select intellectual abilities were assessed with subtests from the Wechsler Adult Intelligence Scale-IV.  Overall intellectual functioning is estimated to be in the average range.  Word knowledge and expressive communicability (Vocabulary) was low average.  He gave generally brief, unelaborated definitions.  Visuospatial processing and constructional abilities (Block Design) and speeded graphomotor learning (Coding) were average.      Immediate memory for two story passages from the Wechsler Memory Scale-Revised was low average with 19 of 50 story elements recalled immediately after presentation.  Retention of the stories 30 minutes later was high average, and he actually recalled more detail than he had originally.  Word list learning on the Riki Auditory Verbal Learning Test was below average for learning over trials with nine of the 15 words learned by the last trial.  Retention of the list after a brief distractor exercise was  borderline impaired, and recall after a 30-minute delay was low average (66% retention).  Thirteen of the 15 words were correctly recognized when presented among a list of foils; two additional words were incorrectly selected.  Immediate memory for figural material (four designs from the WMS) was average, as was recall 30 minutes later.  Three of the four figures were correctly recognized when presented in multiple-choice format.  His copy drawings of three Garibay-Gestalt figures were grossly intact.  All three figures were recalled immediately after presentation, with slight distortion.      Performance on a simple numerical sequencing exercise (Trail Making Test, Part A), requiring sustained attention, was high average for speed and error free.  Performance on a more complex sequencing exercise (Trail Making Test, Part B), requiring divided attention (alternating between number and letter sequences), was above average for speed with one error.  Verbal associative fluency on the Controlled Oral Word Association Test (generating words beginning with target letters) was average.  A variety of brief exercises requiring sustained attention and cognitive flexibility (Test of Sustained Attention and Tracking) were completed quickly with just one error.  Single word reading, as measured by the Wide Range Achievement Test-4, was low average, at the high school level.  Finger tapping speeds were average bilaterally, the right (dominant) hand faster than the left, as expected.      He also finished the MMPI-2-RF, a self-report personality measure. All items were answered, and he findings are valid, an an accurate reflection of true personality dynamics and current emotional functioning.  This individual is slightly prone to anger, but there are no indications of behavioral dyscontrol of sociopathy.  He is emotionally stable, coping well with his medical tribulations.  He's experiencing reduced energy/mild malaise and subtle  cognitive inefficiencies, probably related to his medical condition.  Otherwise, this is a normal MMPI.     CONCLUSIONS AND RECOMMENDATIONS:  This 57-year-old man with new onset cardiac disease was transferred here from a [place] facility in cardiogenic shock with an EF of 15%.  He has responded to the cardiac tune- up here, with EF improved, and he's feeling much better.  He understands he may need an LVAD and/or heart transplant in the foreseeable future, and the workup was initiated during this hospitalization.  He was still in the early stages of learning about these advanced treatments, and hopes he won't need it anytime soon.  Mr. Sharp is  with two grown sons from his first marriage and a 14-year-old son from a subsequent relationship.  Prior to this admission, he was working full-time as a , living with and caring for his elderly father who has dementia.  Mr. Sharp is looking into disability benefits, but thinks he has short and long-term disability benefits through his employer, and will be discussing this with his physicians.  Several family members and his ex-girlfriend were coming to learn about the LVAD today.      There is no history of psychiatric afflictions or substance abuse, and he has never been a tobacco user.  The test findings are largely within normal limits, with average overall intellectual abilities, variable, but grossly intact memory, and average to high average executive abilities.  Finger tapping speeds are within normal limits and he reads at the twelfth grade level.  The MMPI-2-RF, suggest anger proneness but is otherwise unremarkable.      Provided adequate caretakers can be identified, in my view there are no psychosocial contraindications to LVAD or heart transplant.      Ashley Polk, oJse Alfredo.  Licensed Psychologist  Diplomate in Clinical Neuropsychology/[facility]      DIAGNOSTIC IMPRESSION:  Cognitive disorder associated with advanced heart disease and heart  transplant evaluation. This assessment included approximately 2 hours of testing administered by a psychometrist with interpretation by a neuropsychologist (CPT code 44740) and an additional 3 hours of professional time spent on the interview, data integration, record review, and report preparation (CPT code 14335).         YOAN CHRIS             D: 2018   T: 2018   MT: ANDREA      Name:     ADILSON RINCON   MRN:      [id number]        Account:       [id number]   :      1960           Consult Date:  2018      Document: [id number]       cc: Jenni Ortiz MD

## 2018-07-24 NOTE — CONSULTS
History of Present Illness      I was consulted to evaluate patient for mechanical circulatory support options for end stage heart failure. Mr. Mariusz Sharp is a 57-year-old male with medical history significant for type 2 DM and CKD stage 3 who was initially admitted to [facility] in [place] for STEMI on 6/27 s/p PCI with ALYSSA to ostial LAD. He was discharged and was re-admitted on 7/2 due to hematuria and was found to have episode of VT s/p DCCV and ICD placement. TTE on 7/5 showed LVEF 15%, the mid to distal anteroseptum, mid to distal anterior wall, mid to distal anterolateral wall, apical inferior wall and apical septum are severely hypokinetic to akinetic which was new dropping of LVEF. He also found to have new LV thrombus. He subsequently developed cardiogenic shock on 7/11. [place] and IABP were placed. He received milrinone gtt and diuresis with furosemide gtt, prn chlorothiazide and metolazone. On 7/16, he developed VT s/p ICD shock x 3. He was transferred to [facility] for higher level of care. Upon arrival, he was hemodynamically stable on milrinone 0.25 and IABP. He states that he feels better. He denies chest pain, shortness of breath, palpitation or other concerns.                Remainder of 12pt-ROS negative except mentioned above         Other History      Past Medical History         Patient Active Problem List   Diagnosis     Heart failure (H)      Past Surgical History      Past Surgical History    No past surgical history on file.        Medication     No current facility-administered medications on file prior to encounter.   No current outpatient prescriptions on file prior to encounter.     Allergies  Allergies not on file     Family History   Family History    No family history on file.        Social History   Social History    Social History            Social History     Marital status: N/A       Spouse name: N/A     Number of children: N/A     Years of education: N/A          Occupational History  "    Not on file.           Social History Main Topics     Smoking status: Not on file     Smokeless tobacco: Not on file     Alcohol use Not on file     Drug use: Not on file     Sexual activity: Not on file           Other Topics Concern     Not on file      Social History Narrative              Vitals and Exam      BP 92/66  Temp 97.7  F (36.5  C)  Ht 1.626 m (5' 4\")  Wt 79 kg (174 lb 2.6 oz)  SpO2 96%  BMI 29.9 kg/m2      Wt Readings from Last 2 Encounters:   07/16/18 79 kg (174 lb 2.6 oz)      No intake or output data in the 24 hours ending 07/16/18 2040     General: AAO x 3,  no clubbing/cyanosis, no edema, JVP at mid neck   HEENT:  PERRL, MMM with out pharyngeal erythema.   Cardiac: RRR. No m/r/g. Normal S1, S2. DP2+ bilaterally  Pulm: CTAB, no wheezes, no crackles.  Abd: +BS, soft, non distended, non tender. No hepatosplenomegaly.  Skin: No rash  MSK: No deformities, no joint swelling, warm and well perfused    Neuro: CN grossly intact, no focal deficits  Psych: Appropriate mood, full affect          Labs      CBC  Recent Labs  Lab 07/16/18 2001   WBC 9.6   RBC 3.94*   HGB 11.5*   HCT 35.5*   MCV 90   MCH 29.2   MCHC 32.4   RDW 13.5            BMP     Recent Labs  Lab 07/16/18 2001      POTASSIUM 4.0   CHLORIDE 87*   CO2 35*   ANIONGAP 11   *   BUN 86*   CR 2.79*   GFRESTIMATED 24*   GFRESTBLACK 29*   ARLENE 8.9   MAG 2.7*          INR  Recent Labs  Lab 07/16/18 2001   INR 1.22*         Liver panel     Recent Labs  Lab 07/16/18 2001   PROTTOTAL 7.2   ALBUMIN 2.5*   BILITOTAL 0.9   ALKPHOS 170*   *   *     Interpretation Summary  Left ventricular wall thickness is normal. Borderline left ventricular  dilation is present. LVIDd is 5.19cm The Ejection Fraction is estimated at 20-  25%. The mid to distal anteroseptum, mid to distal anterior wall, mid to  distal anterolateral wall and mid to apical septum and apex are akinetic. This  is consistent with large LAD territory " infarct. Laminar thrombus is seen in  the LV apex  The right ventricle is normal size. Difficult to evaluate RV function but it  appears to be mild to moderately reduced.  Moderate tricuspid insufficiency is present. Right ventricular systolic  pressure is 26mmHg above the right atrial pressure.  Dilation of the inferior vena cava is present with normal respiratory  variation in diameter. Estimated mean right atrial pressure is 8 mmHg (mildly  elevated).  No pericardial effusion is present      A/P :57-year-old male with medical history significant for type 2 DM and CKD stage 3 who was initially admitted to [facility] in [place] for STEMI on 6/27 s/p PCI with ALYSSA to ostial LAD. Patient is hemodynamically stable with no urgent need for mechanical circulatory support. Plan discussed with Dr Ortiz. Will do medical management of heart failure and follow up in clinic.

## 2018-07-25 ENCOUNTER — DOCUMENTATION ONLY (OUTPATIENT)
Dept: CARDIOLOGY | Facility: CLINIC | Age: 58
End: 2018-07-25

## 2018-07-25 ENCOUNTER — CARE COORDINATION (OUTPATIENT)
Dept: CARE COORDINATION | Facility: CLINIC | Age: 58
End: 2018-07-25

## 2018-07-26 ENCOUNTER — TELEPHONE (OUTPATIENT)
Dept: CARDIOLOGY | Facility: CLINIC | Age: 58
End: 2018-07-26

## 2018-07-26 LAB
A* LOCUS NMDP: NORMAL
A* LOCUS: NORMAL
A* NMDP: NORMAL
A*: NORMAL
ABTEST METHOD: NORMAL
B* LOCUS NMDP: NORMAL
B* LOCUS: NORMAL
B* NMDP: NORMAL
B*: NORMAL
BW-1: NORMAL
C* LOCUS NMDP: NORMAL
C* LOCUS: NORMAL
C* NMDP: NORMAL
C*: NORMAL
DPA1* NMDP: NORMAL
DPA1*: NORMAL
DPB1* LOCUS NMDP: NORMAL
DPB1* NMDP: NORMAL
DPB1*: NORMAL
DPB1*LOCUS: NORMAL
DQA1*: NORMAL
DQA1*LOCUS NMDP: NORMAL
DQA1*LOCUS: NORMAL
DQA1*NMDP: NORMAL
DQB1* LOCUS: NORMAL
DQB1* NMDP: NORMAL
DQB1*: NORMAL
DRB1* LOCUS NMDP: NORMAL
DRB1* LOCUS: NORMAL
DRB1* NMDP: NORMAL
DRB1*: NORMAL
DRB3* LOCUS NMDP: NORMAL
DRB3* LOCUS: NORMAL
DRB5* NMDP: NORMAL
DRB5*: NORMAL
DRSSO TEST METHOD: NORMAL

## 2018-07-26 NOTE — TELEPHONE ENCOUNTER
[facility] Call Center    Phone Message    May a detailed message be left on voicemail: yes    Reason for Call: Symptoms or Concerns     If patient has red-flag symptoms, warm transfer to triage line    Current symptom or concern: low blood pressure    Symptoms have been present for:  1 day(s)    Has patient previously been seen for this? No          Action Taken: Message routed to:  [facility] ([facility]): cardiology

## 2018-07-26 NOTE — TELEPHONE ENCOUNTER
"Spoke with Lisa quinones, patient's friend:     She states\" He has been having low BP. Yesterday the numbers were runnin's/40's and I had him sit bruce for a while and it came up to 98/60. Today we went to the PCP office and his BP was running at 72/64 and that's after tsking his medication. I retook the BP @1:30pm and it was at 78/58. He is no symptomatic. PCP told him to hold his hydralazine until BP comes back up. His PCP is not comfortable changing anything that relates to the heart and told us to call Dr. Ortiz for any other changes\".  She was also worried about his urine color( dark sosa). She said they jay blood but she has not heard anything back from his PCP about the results.     She would like for someone to give him a call and see what else can be done to better his BP.   "

## 2018-07-26 NOTE — DISCHARGE SUMMARY
Advanced Heart Failure and Transplant Cardiology   Discharge Summary  Mariusz Sharp   : 1960  MRN: [id number]  Primary care provider: [facility]          Date of Admission:  2018  Date of Discharge:  2018  Admission Attending:  Dr. Jenni Ortiz  Discharge Attending:  Dr. Jenni Ortiz   Resident:    Dr. Roc Cisneros   Service:    Cards 2     REASON FOR ADMISSION  Cardiogenic Shock with Balloon Pump in Place, VT/VF     DISCHARGE DIAGNOSIS  1. Cardiogenic Shock requiring Balloon Pump: Resolved  2. ICM LVEF 20-25%; NYHA Class III Stage C; Acute Systolic Heart Failure  3. Monomorphic VT s/p AICD Shock x4 on 18  4. CAD with STEMI s/p ALYSSA to Ostial LAD 18  5. Left Ventricular Thrombus  6. Non Oliguric ANDREW on CKD III   7. Acute Bronchitis: Resolved   8. Paroxysmal Atrial Flutter on anticoagulation   9. Poorly Controlled Type II DM     PROCEDURES & SIGNIFICANT FINDINGS  PFT 18 wnl     TTE 18  Interpretation Summary  Left ventricular wall thickness is normal. Borderline left ventricular  dilation is present. LVIDd is 5.19cm The Ejection Fraction is estimated at 20-  25%. The mid to distal anteroseptum, mid to distal anterior wall, mid to  distal anterolateral wall and mid to apical septum and apex are akinetic. This  is consistent with large LAD territory infarct. Laminar thrombus is seen in  the LV apex  The right ventricle is normal size. Difficult to evaluate RV function but it  appears to be mild to moderately reduced.  Moderate tricuspid insufficiency is present. Right ventricular systolic  pressure is 26mmHg above the right atrial pressure.  Dilation of the inferior vena cava is present with normal respiratory  variation in diameter. Estimated mean right atrial pressure is 8 mmHg (mildly  elevated).  No pericardial effusion is present.    CT Chest 18   IMPRESSION:   1. Cardiomegaly.  2.  Consolidative densities in the posterior lower lobes, right greater  than left, atelectasis versus infection.  3. Cholelithiasis without cholecystitis.    CT Head 7/22/18 Impression: No acute intracranial pathology.    PA Catheter Number on Milrinone 0.25gtt and Balloon Pump on Arrival 7/16/18  MAP 70  CVP 12  PA 38/20  PCWP 24  CI of 2.2   SVR 1333    CONSULTATIONS  1. LVAD Coordinator  2. Cardiothoracic Surgery  3. Electrophysiology  4. Adult Neuropsychology  5. Endocrinology  6. Palliative Care    HOSPITAL COURSE BY PROBLEM   1. ICM EF of 20-25% s/p AICD; NYHA Class III Stage C; Cardiogenic Shock (Resolved);  Acute Systolic Heart Failure, LVAD workup.   The patient had a recent medical history notable for an ostial LAD STEMI on 6/27/18 c/b VT/VF. He had been recently discharged following this at OSH and then was readmitted to the OSH with ICD shocks for VT/VF and was reportedly found to be in cardiogenic shock (though initial PA catheter numbers not available). He was transferred to [facility] for further cares on 7/16/18 with IABP in place on milrinone gtt. On arrival given his juanjose, he was switched to dobutamine,  and diuresed to euvolemia. See initial PA catheter numbers above. The patient's hemodynamics stabilized on dobutamine and his IABP was pulled. He did have some residual bleeding from his IABP groin site which resolved with prolonged pressure. He briefly required nipride and following this, PO afterload reducers were started with isordil and hydralazine, rather than ACEi/ARB given his tenuous kidney function, and he was able to be weaned from the dobutamine/nipride. His medical therapies were further titrated and given concern that he would continue to have significant electrical aberrancies, an LVAD workup was initiated. The full workup was completed with no barriers. The patient however stabilized with his EF improving, and he had no arrythmias while in the hospital and so VAD placement was deferred and he  will continue medical therapies as below. He will need close follow up with the [facility] and will need to continue Cardiac Rehab.     Medical Therapies for Heart Failure  Digoxin 125mcg qday  Beta Blockade: Metoprolol XL 12.5mg qday   Afterload Reduction: Hydralazine 50mg TID and Imdur 30mg qday   ACEi/ARB Deferred given ANDREW  Aldosterone Antagonist: Deferred while other medical therapies optimized  SCD PPX: AICD in place; Beta Blockade   Dual Antiplatelet: ASA and Plavix   Statin: Crestor 20 hs   Amiodarone 200mg qday  Anticoagulation: Warfarin goal INR 2-2.5  Volume Status: Euvolemic off of diuretics. Dry weight of 170lbs     2. Monomorphic VT s/p AICD s/p Shocks x4 on 7/16/18 prior to transfer to [facility]  The patient received 4 shocks from his AICD prior to transfer to [facility]. The reason for his VT was thought to be his heart failure decompensation and milrinone. The patient was seen by EP and ablation was considered. However the patient was found to have an LV thrombus and so this was deferred while his heart failure medication regimen was optimized. If he redevelops VT, ablation will need to be considered. He completed a full 8g amiodarone load and was transitioned to 200mg of PO amiodarone daily.     3. LV Thrombus: secondary to large anterior MI. The patient was therapeutically anticoagulated with heparin on arrival and transitioned to warfarin with a goal INR of 2-2.5. He will need to continue this even after resolution of his thrombus as he also meets secondary criteria for anticoagulation for his atrial fibrillation.     4. Poorly Controlled Type II DM with last A1c of 15: Was seen by endocrinology as an inpatient. He was discharged on Glargine 20 units hs, along with carb counting and correctional insulin.   He will follow with his PCP for management of his DMII.     5. Non Oliguric ANDREW on CKD 3: The patient presented with a Cr of 2.8 from a baseline cr of 1-1.2. With improvement of his cardiac function, his  "kidney function did rapidly improve and on discharge his Cr was 1.7, This will need to be followed to see if this is his new baseline or if he continues to improve.     6. Acute Bronchitis: Resolved: At OSH was started on abx for bronchitis. He finished out a 5 day course of doxycycline and cefdinir for this.     7. Paroxysmal Atrial Flutter   Rate control: Metoprolol 12.5mg XL   Rhythm: He is on amiodarone for VT above   Anticoagulation: Warfarin goal INR 2-2.5.     PHYSICAL EXAM  Blood pressure 96/70, pulse 81, temperature 97.9  F (36.6  C), temperature source Oral, resp. rate 16, height 1.626 m (5' 4\"), weight 77 kg (169 lb 12.8 oz), SpO2 97 %.  Estimated Dry Weight:~170 lbs  General: Sitting comfortably in bed in no acute distress.    HEENT: anicteric sclera, PERRL, EOMI, MMM   Neck: No JVD appreciated.   CV: Normal rate, regular rhythm. No murmurs, rubs, or gallops appreciated.  Lungs: No respiratory distress. Crackles in the lower lung fields, R>L  Abd: Soft, not tender, not distended, no palpable organomegaly, no masses   : No suprapubic tenderness, no CVA tenderness   Ext: Warm, well perfused, no BLE edema  Skin: no rashes, cyanosis, or jaundice  Neuro: AOx3. No focal neurologic deficits    ROUTINE ICU LABS (Last four results)  CMP  Recent Labs  Lab 07/24/18  0604 07/23/18  0645 07/22/18  0551 07/21/18  0649    139 139 140   POTASSIUM 4.4 4.1 4.1 3.8   CHLORIDE 110* 109 106 104   CO2 22 22 22 26   ANIONGAP 8 8 11 9   * 83 166* 125*   BUN 50* 51* 54* 59*   CR 1.71* 1.74* 1.67* 1.83*   GFRESTIMATED 42* 41* 43* 39*   GFRESTBLACK 50* 49* 52* 47*   ARLENE 8.8 8.8 8.6 8.7   MAG 2.1 2.4* 2.0 2.2     CBC  Recent Labs  Lab 07/24/18  0604 07/23/18  0645 07/22/18  0551 07/21/18  0748   WBC 6.1 7.0 6.9 8.0   RBC 3.45* 3.46* 3.36* 3.47*   HGB 9.9* 9.9* 9.6* 9.9*   HCT 32.6* 32.2* 31.5* 32.1*   MCV 95 93 94 93   MCH 28.7 28.6 28.6 28.5   MCHC 30.4* 30.7* 30.5* 30.8*   RDW 14.9 14.7 14.3 14.0    251 221 " 205     INR  Recent Labs  Lab 07/24/18  0604 07/23/18  0645 07/22/18  0551 07/21/18  0649   INR 1.78* 1.34* 1.22* 1.23*     Arterial Blood Gas  Recent Labs  Lab 07/20/18  0403 07/19/18  2057   O2PER 1.5LPM 21%     PENDING RESULTS  Unresulted Labs Ordered in the Past 30 Days of this Admission     No orders found from 5/17/2018 to 7/17/2018.          DISCHARGE MEDICATIONS  Discharge Medication List as of 7/24/2018  2:25 PM      START taking these medications    Details   digoxin (LANOXIN) 125 MCG tablet Take 1 tablet (125 mcg) by mouth daily, Disp-30 tablet, R-11, E-Prescribe      hydrALAZINE (APRESOLINE) 50 MG tablet Take 1 tablet (50 mg) by mouth 3 times daily, Disp-120 tablet, R-11, E-Prescribe      isosorbide mononitrate (IMDUR) 30 MG 24 hr tablet Take 1 tablet (30 mg) by mouth daily, Disp-30 tablet, R-11, E-Prescribe      rosuvastatin (CRESTOR) 20 MG tablet Take 1 tablet (20 mg) by mouth At Bedtime, Disp-30 tablet, R-11, E-Prescribe         CONTINUE these medications which have CHANGED    Details   amiodarone (PACERONE/CODARONE) 200 MG tablet Take 1 tablet (200 mg) by mouth daily, Disp-60 tablet, R-11, E-Prescribe      insulin glargine (LANTUS SOLOSTAR) 100 UNIT/ML pen Inject 20 Units Subcutaneous every evening, Disp-3 mL, R-11, E-Prescribe      metoprolol succinate (TOPROL-XL) 25 MG 24 hr tablet Take 0.5 tablets (12.5 mg) by mouth daily, Disp-30 tablet, R-11, E-Prescribe         CONTINUE these medications which have NOT CHANGED    Details   aspirin 81 MG tablet Take 81 mg by mouth daily, Historical      clopidogrel (PLAVIX) 75 MG tablet Take 75 mg by mouth daily, Historical      famotidine (PEPCID) 20 MG tablet Take 20 mg by mouth 2 times daily, Historical      insulin aspart (NOVOLOG PEN) 100 UNIT/ML injection Less than 120 mg/dL = 0 Units  120-149 mg/dL = 1 Unit  150-199 mg/dL = 2 Units  200-249 mg/dL = 3 Units  250-299 mg/dL = 5 Units  300-349 mg/dL = 7 Units  350 mg/dL or greater = 8 Units, Historical       metFORMIN (GLUCOPHAGE) 500 MG tablet Take 500 mg by mouth 2 times daily (with meals), Historical      nitroGLYcerin (NITROSTAT) 0.4 MG sublingual tablet Place 0.4 mg under the tongue every 5 minutes as needed for chest pain For chest pain place 1 tablet under the tongue every 5 minutes for 3 doses. If symptoms persist 5 minutes after 1st dose call 911., Historical      warfarin (COUMADIN) 2 MG tablet Take 4 mg by mouth daily On 7/1 and 7/2, then recheck INR at [facility] and take as directed, Historical         STOP taking these medications       atorvastatin (LIPITOR) 80 MG tablet Comments:   Reason for Stopping:         cefdinir (OMNICEF) 300 MG capsule Comments:   Reason for Stopping:         doxycycline monohydrate 100 MG capsule Comments:   Reason for Stopping:         lisinopril (PRINIVIL/ZESTRIL) 2.5 MG tablet Comments:   Reason for Stopping:               DISCHARGE INSTRUCTIONS    Discharge Procedure Orders  Cardiac Rehab Referral   Referral Type: Rehab Therapy Cardiac Therapy     Cardiac Rehab Referral   Referral Type: Rehab Therapy Cardiac Therapy     Cardiac Rehab Referral     Follow Up and recommended labs and tests   Order Comments: ______________________________________________________    [place] [address]  Phone: [phone]  Fax: [phone]    For INR and Warfarin monitoring (Goal=2-2.5).   Indication for Anticoagulation: Atrial Fibrillation and LV Thrombus.   Expected duration of therapy: Lifetime.    Like He to follow.     Appointment with Dr. Patricia Irving on 7/26/18 at 8:20 AM for INR lab draw, referral to INR nurse, and establishment of care.   ______________________________________________________     Reason for your hospital stay   Order Comments: Dear  Aron,    You were transferred to [facility] because you had a heart attack, which was complicated by heart failure leading to  cardiogenic shock (when the heart doesn't pump enough blood to the rest of the body). We were able to stabilize you and get you started on your LVAD workup. We look forward to continuing to work with you. We will see you as an outpatient.     Thank you for your cooperation and partnership during this hospitalization.     Adult [facility] Follow-up and recommended labs and tests   Order Comments: We will see you on 8/6 in heart failure clinic.     Appointments on [place] and/or [facility] (with [facility] or [facility] provider or service). Call [phone] if you haven't heard regarding these appointments within 7 days of discharge.     Follow Up and recommended labs and tests   Order Comments: You are scheduled to get routine labs on 8/6.     Activity   Order Comments: Your activity upon discharge: activity as tolerated   Order Specific Question Answer Comments   Is discharge order? Yes      Full Code     Diet   Order Comments: Follow this diet upon discharge: Orders Placed This Encounter     Fluid restriction 2000 ML FLUID     Combination Diet 5890-7813 Calories: Moderate Consistent CHO (4-6 CHO units/meal); 2 gm NA Diet   Order Specific Question Answer Comments   Is discharge order? Yes         DISCHARGE DISPOSITION  Home     CONDITION ON DISCHARGE  Discharge condition: Stable  Code Status: Full Code    Date of service: 7/24/2018    The patient was discussed with Dr. Angel Cisneros MD  Internal Medicine Resident  Cardiology Service   [phone]        I,Jenni Ortiz MD, have seen and examined this patient. I have discussed with the team and agree with the findings and discharge plan. I have reviewed the hospital course with the patient and have spent 35 minutes in the process of discharge, including discharge planning with patient education, medication teaching, and the coordination of care to outpatient providers.  It has been a pleasure to participate in the care of your patient - please do  not hesitate to contact me with any questions.    Jenni Ortiz MD  Section Head - Advanced Heart Failure, Transplantation and Mechanical Circulatory Support  Co-Director - Adult Congenital and Cardiovascular Genetics Center  Associate Professor of Medicine, [facility]

## 2018-07-26 NOTE — TELEPHONE ENCOUNTER
Coe patient back, spoke with both him and Lisa. Patient has been a little fatigue with some occasional lightheadedness. Discussed with Dr. Ortiz, she recommends no changes as patient had similar BPs while hospitalized and that he eats and drinks well.  They should continue to monitor and call back if he has persistant symptoms.  They verbalized understanding and agreeable with plan

## 2018-07-27 LAB
PROTOCOL CUTOFF: NORMAL
SA1 CELL: NORMAL
SA1 COMMENTS: NORMAL
SA1 HI RISK ABY: NORMAL
SA1 MOD RISK ABY: NORMAL
SA1 TEST METHOD: NORMAL
SA2 CELL: NORMAL
SA2 COMMENTS: NORMAL
SA2 HI RISK ABY UA: NORMAL
SA2 MOD RISK ABY: NORMAL
SA2 TEST METHOD: NORMAL
UNACCEPTABLE ANTIGEN: NORMAL
UNOS CPRA: 0

## 2018-07-30 DIAGNOSIS — I50.20 HFREF (HEART FAILURE WITH REDUCED EJECTION FRACTION) (H): Primary | ICD-10-CM

## 2018-08-01 DIAGNOSIS — I50.22 CHRONIC SYSTOLIC CONGESTIVE HEART FAILURE (H): Primary | ICD-10-CM

## 2018-08-03 ENCOUNTER — TELEPHONE (OUTPATIENT)
Dept: CARDIOLOGY | Facility: CLINIC | Age: 58
End: 2018-08-03

## 2018-08-03 NOTE — TELEPHONE ENCOUNTER
Called Vero back, she transferred me to Sabrina who is the patient's .    She was inquiring about the need for the testing she was asked to authorize for VAD workup.  Patient may not tolerate medical therapy, but we won't know until few weeks. This the VAD work up needs to be completed as back up plan.    Sabrina provided her direct phone number so she can be reached in the future for authorizing additional tests.

## 2018-08-03 NOTE — TELEPHONE ENCOUNTER
[facility] Call Center    Phone Message    May a detailed message be left on voicemail: yes    Reason for Call: Other: Per call from Vero is returning calls to [facility].  Per Vero she will have lunch at 12:00 coming back at 12:30.  She can be reached at the above #     Action Taken: Message routed to:  [facility] Cardiology

## 2018-08-03 NOTE — TELEPHONE ENCOUNTER
[facility] Call Center    Phone Message    May a detailed message be left on voicemail: yes    Reason for Call: Other: Sasha from [employer] short term disability is requesting recent office visit notes for pt. She said they are looking for a possible return to work date for patient. OK to fax or email. Fax: [phone], Email: [email or web address] Reference number [id number].     Action Taken: Message routed to:  [facility] Cardiology

## 2018-08-03 NOTE — TELEPHONE ENCOUNTER
[facility] Call Center    Phone Message    May a detailed message be left on voicemail: no    Reason for Call: Other: Pt of Vero Ballesteros from [facility] is calling to talk to the care team about setting up Case Management on the pt, please give her a call back     Action Taken: Message routed to:  [facility] Cardio

## 2018-08-06 ENCOUNTER — HOSPITAL ENCOUNTER (OUTPATIENT)
Dept: ULTRASOUND IMAGING | Facility: CLINIC | Age: 58
End: 2018-08-06
Attending: INTERNAL MEDICINE
Payer: COMMERCIAL

## 2018-08-06 ENCOUNTER — OFFICE VISIT (OUTPATIENT)
Dept: CARDIOLOGY | Facility: CLINIC | Age: 58
End: 2018-08-06
Attending: NURSE PRACTITIONER
Payer: COMMERCIAL

## 2018-08-06 ENCOUNTER — HOSPITAL ENCOUNTER (OUTPATIENT)
Dept: CT IMAGING | Facility: CLINIC | Age: 58
End: 2018-08-06
Attending: INTERNAL MEDICINE
Payer: COMMERCIAL

## 2018-08-06 ENCOUNTER — HOSPITAL ENCOUNTER (OUTPATIENT)
Dept: CARDIOLOGY | Facility: CLINIC | Age: 58
Discharge: HOME | End: 2018-08-06
Attending: INTERNAL MEDICINE | Admitting: INTERNAL MEDICINE
Payer: COMMERCIAL

## 2018-08-06 ENCOUNTER — TRANSFERRED RECORDS (OUTPATIENT)
Dept: HEALTH INFORMATION MANAGEMENT | Facility: CLINIC | Age: 58
End: 2018-08-06

## 2018-08-06 VITALS
HEART RATE: 74 BPM | DIASTOLIC BLOOD PRESSURE: 75 MMHG | WEIGHT: 166.1 LBS | BODY MASS INDEX: 28.36 KG/M2 | SYSTOLIC BLOOD PRESSURE: 109 MMHG | HEIGHT: 64 IN | OXYGEN SATURATION: 98 %

## 2018-08-06 DIAGNOSIS — I50.23 ACUTE ON CHRONIC SYSTOLIC HEART FAILURE (H): Primary | ICD-10-CM

## 2018-08-06 DIAGNOSIS — I50.22 CHRONIC SYSTOLIC CONGESTIVE HEART FAILURE (H): ICD-10-CM

## 2018-08-06 DIAGNOSIS — I50.23 ACUTE ON CHRONIC SYSTOLIC HEART FAILURE (H): ICD-10-CM

## 2018-08-06 DIAGNOSIS — I48.91 ATRIAL FIBRILLATION, UNSPECIFIED TYPE (H): ICD-10-CM

## 2018-08-06 DIAGNOSIS — I50.21 ACUTE SYSTOLIC HEART FAILURE (H): ICD-10-CM

## 2018-08-06 DIAGNOSIS — I23.6 LV (LEFT VENTRICULAR) MURAL THROMBUS FOLLOWING MI (H): Primary | ICD-10-CM

## 2018-08-06 DIAGNOSIS — I50.20 HFREF (HEART FAILURE WITH REDUCED EJECTION FRACTION) (H): ICD-10-CM

## 2018-08-06 DIAGNOSIS — I10 BENIGN ESSENTIAL HYPERTENSION: ICD-10-CM

## 2018-08-06 DIAGNOSIS — I25.10 CORONARY ARTERY DISEASE INVOLVING NATIVE CORONARY ARTERY OF NATIVE HEART WITHOUT ANGINA PECTORIS: ICD-10-CM

## 2018-08-06 DIAGNOSIS — I47.29 PAROXYSMAL VENTRICULAR TACHYCARDIA (H): ICD-10-CM

## 2018-08-06 LAB
6 MIN WALK (FT): 1260 FT
6 MIN WALK (M): 384 M
ALBUMIN UR-MCNC: NEGATIVE MG/DL
AMPHETAMINES UR QL SCN: NEGATIVE
ANION GAP SERPL CALCULATED.3IONS-SCNC: 11 MMOL/L (ref 3–14)
APPEARANCE UR: ABNORMAL
BARBITURATES UR QL: NEGATIVE
BENZODIAZ UR QL: NEGATIVE
BILIRUB UR QL STRIP: NEGATIVE
BUN SERPL-MCNC: 43 MG/DL (ref 7–30)
CALCIUM SERPL-MCNC: 8.8 MG/DL (ref 8.5–10.1)
CANNABINOIDS UR QL SCN: NEGATIVE
CHLORIDE SERPL-SCNC: 105 MMOL/L (ref 94–109)
CHOLEST SERPL-MCNC: 126 MG/DL
CO2 SERPL-SCNC: 21 MMOL/L (ref 20–32)
COCAINE UR QL: NEGATIVE
COLOR UR AUTO: YELLOW
CREAT SERPL-MCNC: 1.57 MG/DL (ref 0.66–1.25)
ETHANOL UR QL SCN: NEGATIVE
GFR SERPL CREATININE-BSD FRML MDRD: 46 ML/MIN/1.7M2
GLUCOSE SERPL-MCNC: 207 MG/DL (ref 70–99)
GLUCOSE UR STRIP-MCNC: 150 MG/DL
HDLC SERPL-MCNC: 52 MG/DL
HGB FREE PLAS-MCNC: 90 MG/DL
HGB UR QL STRIP: ABNORMAL
INR PPP: 1.46 (ref 0.86–1.14)
KETONES UR STRIP-MCNC: NEGATIVE MG/DL
LDLC SERPL CALC-MCNC: 24 MG/DL
LEUKOCYTE ESTERASE UR QL STRIP: NEGATIVE
MUCOUS THREADS #/AREA URNS LPF: PRESENT /LPF
NITRATE UR QL: NEGATIVE
NONHDLC SERPL-MCNC: 74 MG/DL
OPIATES UR QL SCN: NEGATIVE
PCP UR QL SCN: NEGATIVE
PH UR STRIP: 5 PH (ref 5–7)
POTASSIUM SERPL-SCNC: 3.8 MMOL/L (ref 3.4–5.3)
PREALB SERPL IA-MCNC: 29 MG/DL (ref 15–45)
PSA SERPL-ACNC: 4.48 UG/L (ref 0–4)
RAD FLAG-ADDENDUM: ABNORMAL
RBC #/AREA URNS AUTO: >182 /HPF (ref 0–2)
SODIUM SERPL-SCNC: 137 MMOL/L (ref 133–144)
SOURCE: ABNORMAL
SP GR UR STRIP: 1.01 (ref 1–1.03)
TRIGL SERPL-MCNC: 246 MG/DL
UROBILINOGEN UR STRIP-MCNC: 0 MG/DL (ref 0–2)
WBC #/AREA URNS AUTO: 4 /HPF (ref 0–5)

## 2018-08-06 PROCEDURE — 80320 DRUG SCREEN QUANTALCOHOLS: CPT | Performed by: INTERNAL MEDICINE

## 2018-08-06 PROCEDURE — 80061 LIPID PANEL: CPT | Performed by: NURSE PRACTITIONER

## 2018-08-06 PROCEDURE — 83051 HEMOGLOBIN PLASMA: CPT | Performed by: NURSE PRACTITIONER

## 2018-08-06 PROCEDURE — 74176 CT ABD & PELVIS W/O CONTRAST: CPT

## 2018-08-06 PROCEDURE — 85610 PROTHROMBIN TIME: CPT | Performed by: NURSE PRACTITIONER

## 2018-08-06 PROCEDURE — 94621 CARDIOPULM EXERCISE TESTING: CPT | Performed by: INTERNAL MEDICINE

## 2018-08-06 PROCEDURE — 94621 CARDIOPULM EXERCISE TESTING: CPT | Mod: 26 | Performed by: INTERNAL MEDICINE

## 2018-08-06 PROCEDURE — 99215 OFFICE O/P EST HI 40 MIN: CPT | Mod: ZP | Performed by: NURSE PRACTITIONER

## 2018-08-06 PROCEDURE — 81001 URINALYSIS AUTO W/SCOPE: CPT | Performed by: INTERNAL MEDICINE

## 2018-08-06 PROCEDURE — G0463 HOSPITAL OUTPT CLINIC VISIT: HCPCS

## 2018-08-06 PROCEDURE — 36415 COLL VENOUS BLD VENIPUNCTURE: CPT | Performed by: NURSE PRACTITIONER

## 2018-08-06 PROCEDURE — 80307 DRUG TEST PRSMV CHEM ANLYZR: CPT | Performed by: INTERNAL MEDICINE

## 2018-08-06 PROCEDURE — 80048 BASIC METABOLIC PNL TOTAL CA: CPT | Performed by: NURSE PRACTITIONER

## 2018-08-06 PROCEDURE — 76700 US EXAM ABDOM COMPLETE: CPT

## 2018-08-06 PROCEDURE — 84134 ASSAY OF PREALBUMIN: CPT | Performed by: NURSE PRACTITIONER

## 2018-08-06 PROCEDURE — G0103 PSA SCREENING: HCPCS | Performed by: NURSE PRACTITIONER

## 2018-08-06 RX ORDER — METOPROLOL SUCCINATE 25 MG/1
12.5 TABLET, EXTENDED RELEASE ORAL 2 TIMES DAILY
Qty: 30 TABLET | Refills: 11 | Status: ON HOLD | OUTPATIENT
Start: 2018-08-06 | End: 2019-01-09

## 2018-08-06 ASSESSMENT — ENCOUNTER SYMPTOMS
INCREASED ENERGY: 0
SLEEP DISTURBANCES DUE TO BREATHING: 0
HEMOPTYSIS: 0
SPEECH CHANGE: 0
MEMORY LOSS: 0
POLYPHAGIA: 0
PANIC: 0
DECREASED CONCENTRATION: 0
CHILLS: 0
DISTURBANCES IN COORDINATION: 0
WEAKNESS: 0
DIZZINESS: 1
TASTE DISTURBANCE: 0
EXERCISE INTOLERANCE: 1
HOARSE VOICE: 0
PARALYSIS: 0
TREMORS: 0
POSTURAL DYSPNEA: 0
INSOMNIA: 1
HYPERTENSION: 0
WEIGHT GAIN: 0
SEIZURES: 0
SMELL DISTURBANCE: 0
SORE THROAT: 0
SINUS CONGESTION: 0
COUGH DISTURBING SLEEP: 1
ORTHOPNEA: 1
FATIGUE: 1
NERVOUS/ANXIOUS: 0
COUGH: 1
TINGLING: 1
HALLUCINATIONS: 0
TROUBLE SWALLOWING: 0
SINUS PAIN: 0
NECK MASS: 0
ALTERED TEMPERATURE REGULATION: 0
POLYDIPSIA: 0
SPUTUM PRODUCTION: 1
SHORTNESS OF BREATH: 1
LIGHT-HEADEDNESS: 1
WEIGHT LOSS: 1
DEPRESSION: 0
HEADACHES: 0
DECREASED APPETITE: 0
WHEEZING: 1
LEG PAIN: 0
HYPOTENSION: 1
SNORES LOUDLY: 1
NIGHT SWEATS: 0
DYSPNEA ON EXERTION: 0
LOSS OF CONSCIOUSNESS: 0
PALPITATIONS: 0
FEVER: 1
NUMBNESS: 0
SYNCOPE: 0

## 2018-08-06 ASSESSMENT — MINNESOTA LIVING WITH HEART FAILURE QUESTIONNAIRE (MLHF)
TOTAL_SCORE: 71
LOSS OF SELF CONTROL IN YOUR LIFE: 3
DIFFICULTY GOING AWAY FROM HOME: 3
WALKING ABOUT OR CLIMBING STAIRS DIFFICULT: 4
MAKING YOU FEEL DEPRESSED: 1
MAKING YOU WORRY: 2
EATING LESS FOODS YOU LIKE: 5
WORKING AROUND THE HOUSE OR YARD DIFFICULT: 4
MAKING YOU SHORT OF BREATH: 3
COSTING YOU MONEY FOR MEDICAL CARE: 3
GIVING YOU SIDE EFFECTS FROM TREATMENTS: 0
DIFFICULTY SOCIALIZING WITH FAMILY OR FRIENDS: 5
HAVING TO SIT OR LIE DOWN DURING THE DAY: 3
DIFFICULTY WITH SEXUAL ACTIVITIES: 5
DIFFICULTY WITH RECREATIONAL PASTIMES, SPORTS, HOBBIES: 5
MAKING YOU STAY IN A HOSPITAL: 5
DIFFICULTY TO CONCENTRATE OR REMEMBERING THINGS: 0
TIRED, FATIGUED OR LOW ON ENERGY: 4
FEELING LIKE A BURDEN TO FAMILY AND FRIENDS: 5
DIFFICULTY WORKING TO EARN A LIVING: 5
DIFFICULTY SLEEPING WELL AT NIGHT: 4
SWELLING IN ANKLES OR LEGS: 2

## 2018-08-06 ASSESSMENT — PAIN SCALES - GENERAL: PAINLEVEL: NO PAIN (0)

## 2018-08-06 NOTE — MR AVS SNAPSHOT
After Visit Summary   8/6/2018    Adilson Sharp    MRN: [id number]           Patient Information     Date Of Birth          1960        Visit Information        Provider Department      8/6/2018 3:00 PM Pippa Rodriguez NP M [facility] [place]        Today's Diagnoses     LV (left ventricular) mural thrombus following MI (H)    -  1    Acute systolic heart failure (H)          Care Instructions    You were seen today in the Cardiovascular Clinic at the [facility].     Cardiology Providers you saw during your visit: Ppipa MORALES, CNP      Follow up and medication changes:    1. Increase Toprol XL 12.5 mg two times a day.   2. Repeat Basic Metabolic Panel lab in one week. I will fax order to your lab  3. Check BP/Pulse for 1 week.   4. I will call you in a week after we have lab results and check in, possibly make medication adjustments at that time.   5. Follow up with Dr Ortiz as scheduled in September.       Results for ADILSON SHARP (MRN [id number]) as of 8/6/2018 14:54   Ref. Range 8/6/2018 10:25   Sodium Latest Ref Range: 133 - 144 mmol/L 137   Potassium Latest Ref Range: 3.4 - 5.3 mmol/L 3.8   Chloride Latest Ref Range: 94 - 109 mmol/L 105   Carbon Dioxide Latest Ref Range: 20 - 32 mmol/L 21   Urea Nitrogen Latest Ref Range: 7 - 30 mg/dL 43 (H)   Creatinine Latest Ref Range: 0.66 - 1.25 mg/dL 1.57 (H)   GFR Estimate Latest Ref Range: >60 mL/min/1.7m2 46 (L)   GFR Estimate If Black Latest Ref Range: >60 mL/min/1.7m2 56 (L)   Calcium Latest Ref Range: 8.5 - 10.1 mg/dL 8.8   Anion Gap Latest Ref Range: 3 - 14 mmol/L 11         Please limit your fluid intake to 2 L (68 ounces) daily.  2 Liters a day = 8.5 cups, or 68 ounces.  Please limit your salt intake to 2 grams a day or less.     If you gain 2# in 24 hours or 5# in one week call Sasha Schwartz RN so we can adjust your medications as needed over the phone.     Please feel free to call me with any questions or  "concerns.       Sasha Schwartz RN CHFN  [facility]  Cardiology Care Coordinator-Heart Failure Clinic     Questions and [phone].   First press #1 for the University and then press #3 for \"Medical Questions\" to reach us Cardiology Nurses.      On Call Cardiologist for after hours or on weekends: [phone]   option #4 and ask to speak to the on-call Cardiologist. Inform them you are a CORE/heart failure patient at the [place].           If you need a medication refill please contact your pharmacy.  Please allow 3 business days for your refill to be completed.  _______________________________________________________  C.O.R.E[facility] CLIN[facility] Cardiomyopathy, Optimization, Rehabilitation, Education   The [facility].R.E. CLINIC is a heart failure specialty clinic within the [facility] where you will work with specialized nurse practitioners dedicated to helping patients with heart failure carefully adjust medications, receive education, and learn who and when to call if symptoms develop. They specialize in helping you better understand your condition, slow the progression of your disease, improve the length and quality of your life, help you detect future heart problems before they become life threatening, and avoid hospitalizations.  As always, thank you for trusting us with your health care needs!             Follow-ups after your visit        Your next 10 appointments already scheduled     Sep 12, 2018  2:30 PM CDT   Lab with  LAB   [facility])    [address] [place] [address] Floor  [facility] [address] [phone]            Sep 12, 2018  3:00 PM CDT   (Arrive by 2:45 PM)   NEW HEART FAILURE with Jenni Ortiz MD   [facility] ([facility])    [address] [place] [address]  [address] [place] [address] [phone]              Future tests that were ordered for you " "today     Open Future Orders        Priority Expected Expires Ordered    Basic metabolic panel Routine 2018            Who to contact     If you have questions or need follow up information about today's clinic visit or your schedule please contact [facility] directly at [phone].  Normal or non-critical lab and imaging results will be communicated to you by MyChart, letter or phone within 4 business days after the clinic has received the results. If you do not hear from us within 7 days, please contact the clinic through [employer] or phone. If you have a critical or abnormal lab result, we will notify you by phone as soon as possible.  Submit refill requests through [employer] or call your pharmacy and they will forward the refill request to us. Please allow 3 business days for your refill to be completed.          Additional Information About Your Visit        MyChart Information     [facility] lets you send messages to your doctor, view your test results, renew your prescriptions, schedule appointments and more. To sign up, go to [email or web address] . Click on \"Log in\" on the left side of the screen, which will take you to the Welcome page. Then click on \"Sign up Now\" on the right side of the page.     You will be asked to enter the access code listed below, as well as some personal information. Please follow the directions to create your username and password.     Your access code is: [email or web address]  Expires: 10/15/2018  5:33 PM     Your access code will  in 90 days. If you need help or a new code, please call your [facility] clinic or [phone].        Care EveryWhere ID     This is your Care EveryWhere ID. This could be used by other organizations to access your [facility] medical records  [id number]        Your Vitals Were     Pulse Height Pulse Oximetry BMI (Body Mass Index)          74 1.626 m (5' 4\") 98% 28.51 kg/m2         Blood Pressure from Last 3 Encounters: "   08/06/18 109/75   07/24/18 96/70    Weight from Last 3 Encounters:   08/06/18 75.3 kg (166 lb 1.6 oz)   07/24/18 77 kg (169 lb 12.8 oz)              We Performed the Following     INR          Today's Medication Changes          These changes are accurate as of 8/6/18  4:10 PM.  If you have any questions, ask your nurse or doctor.               These medicines have changed or have updated prescriptions.        Dose/Directions    metoprolol succinate 25 MG 24 hr tablet   Commonly known as:  TOPROL-XL   This may have changed:  when to take this   Used for:  Acute systolic heart failure (H)   Changed by:  Pippa Rodriguez NP        Dose:  12.5 mg   Take 0.5 tablets (12.5 mg) by mouth 2 times daily   Quantity:  30 tablet   Refills:  11            Where to get your medicines      These medications were sent to [facility] [address] [place] [address] AT [facility]  [address] [place] [address]     Phone:  [phone]     metoprolol succinate 25 MG 24 hr tablet                Primary Care Provider Office Phone # Fax #    [facility] [phone]       [address] [place] [address]        Equal Access to Services     GILLIAN COX : Hadii aad ku hadasho Socorazonali, waaxda luqadaha, qaybta kaalmada adeegyada, mynor gibbons. So [facility] [phone].    ATENCIÓN: Si habla español, tiene a waddell disposición servicios gratuitos de asistencia lingüística. Llame al [phone].    We comply with applicable federal civil rights laws and [place] laws. We do not discriminate on the basis of race, color, national origin, age, disability, sex, sexual orientation, or gender identity.            Thank you!     Thank you for choosing [facility]  for your care. Our goal is always to provide you with excellent care. Hearing back from our patients is one way we can continue to improve our services. Please take a few minutes to  complete the written survey that you may receive in the mail after your visit with us. Thank you!             Your Updated Medication List - Protect others around you: Learn how to safely use, store and throw away your medicines at www.disposemymeds.org.          This list is accurate as of 8/6/18  4:10 PM.  Always use your most recent med list.                   Brand Name Dispense Instructions for use Diagnosis    amiodarone 200 MG tablet    PACERONE/CODARONE    60 tablet    Take 1 tablet (200 mg) by mouth daily    Acute systolic heart failure (H)       aspirin 81 MG tablet      Take 81 mg by mouth daily        clopidogrel 75 MG tablet    PLAVIX     Take 75 mg by mouth daily        digoxin 125 MCG tablet    LANOXIN    30 tablet    Take 1 tablet (125 mcg) by mouth daily    Acute systolic heart failure (H)       famotidine 20 MG tablet    PEPCID     Take 20 mg by mouth 2 times daily        hydrALAZINE 50 MG tablet    APRESOLINE    120 tablet    Take 1 tablet (50 mg) by mouth 3 times daily    Acute systolic heart failure (H)       insulin aspart 100 UNIT/ML injection    NovoLOG PEN     Less than 120 mg/dL = 0 Units 120-149 mg/dL = 1 Unit 150-199 mg/dL = 2 Units 200-249 mg/dL = 3 Units 250-299 mg/dL = 5 Units 300-349 mg/dL = 7 Units 350 mg/dL or greater = 8 Units        insulin glargine 100 UNIT/ML injection    LANTUS    3 mL    Inject 20 Units Subcutaneous every evening    Type 2 diabetes mellitus with hyperglycemia, with long-term current use of insulin (H)       isosorbide mononitrate 30 MG 24 hr tablet    IMDUR    30 tablet    Take 1 tablet (30 mg) by mouth daily    Acute systolic heart failure (H)       metFORMIN 500 MG tablet    GLUCOPHAGE     Take 500 mg by mouth 2 times daily (with meals)        metoprolol succinate 25 MG 24 hr tablet    TOPROL-XL    30 tablet    Take 0.5 tablets (12.5 mg) by mouth 2 times daily    Acute systolic heart failure (H)       NITROSTAT 0.4 MG sublingual tablet   Generic drug:   nitroGLYcerin      Place 0.4 mg under the tongue every 5 minutes as needed for chest pain For chest pain place 1 tablet under the tongue every 5 minutes for 3 doses. If symptoms persist 5 minutes after 1st dose call 911.        rosuvastatin 20 MG tablet    CRESTOR    30 tablet    Take 1 tablet (20 mg) by mouth At Bedtime    Acute systolic heart failure (H)       warfarin 2 MG tablet    COUMADIN     Take 4 mg by mouth daily On 7/1 and 7/2, then recheck INR at [facility] and take as directed

## 2018-08-06 NOTE — NURSING NOTE
Chief Complaint   Patient presents with     New Patient      New CORE; 57 year old male with HFrEF with EF 15%, recent hospitalization presents for initial visit with labs prior.     Vitals were taken and medications were reconciled.     Leeanna Olivarez MA    11:47 AM

## 2018-08-06 NOTE — LETTER
8/6/2018      RE: Mariusz Sharp  [address] [facility] [address]       Dear Colleague,    Thank you for the opportunity to participate in the care of your patient, Mariusz Sharp, at the [facility] HEART [facility] at [facility]. Please see a copy of my visit note below.      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 Ortiz for newly diagnosed systolic heart failure.    Mariusz was recently hospitalized at [facility] from 7/16-7/24/18 for cardiogenic shock.  He presented from an outside hospital with ICD shocks for VT/VF and was reportedly found to be in cardiogenic shock.  He was transferred to [facility] for further cares on 7/16/2018 on a milrinone drip and with an IABP placed.  He developed an ANDREW on CKD, and his Milrinone was switched to dobutamine.  LVAD workup was completed inpatient.  Since he stabilized, LVAD placement was deferred and he was referred to [facility] for medication optimization.     Since his hospitalization, he has been feeling well.  He does get fatigued if he overdoes it.  He notices shortness of breath with carrying a load of laundry up a flight of stairs.  He gets fatigued after kneading pizza dough for a couple of minutes.  He denies shortness of breath at rest, PND, orthopnea, edema, weight gain, chest pain, palpitations, or near syncopal episodes.  He will feel lightheaded, and will have nausea and vomiting when he pushes himself too much.  He experienced a couple of these episodes after running errands for a couple of hours, then doing laundry, and cooking dinner.  After resting his symptoms resolved.  He been following salt and fluid restrictions.  His weight is down 3 pounds.      PAST MEDICAL HISTORY:  Past Medical History:   Diagnosis Date     Acute kidney injury (H)      CKD (chronic kidney disease)      Coronary artery disease      Diabetes mellitus (H)      HTN (hypertension)      Hyperlipidemia      Ischemic cardiomyopathy      Paroxysmal atrial flutter (H)      Systolic heart failure (H)      Ventricular tachycardia (H)        FAMILY HISTORY:  No family history on file.    SOCIAL HISTORY:  Social History     Social History     Marital status: Single     Spouse name: N/A     Number of children: N/A     Years of education: N/A     Social History Main Topics     Smoking status: Never Smoker     Smokeless tobacco: Never Used     Alcohol use None     Drug use: None     Sexual activity: Not Asked     Other Topics Concern     None     Social History Narrative     None       CURRENT MEDICATIONS:  Current Outpatient Prescriptions   Medication Sig Dispense Refill     amiodarone (PACERONE/CODARONE) 200 MG tablet Take 1 tablet (200 mg) by mouth daily 60 tablet 11     aspirin 81 MG tablet Take 81 mg by mouth daily       clopidogrel (PLAVIX) 75 MG tablet Take 75 mg by mouth daily       digoxin (LANOXIN) 125 MCG tablet Take 1 tablet (125 mcg) by mouth daily 30 tablet 11     famotidine (PEPCID) 20 MG tablet Take 20 mg by mouth 2 times daily       hydrALAZINE (APRESOLINE) 50 MG tablet Take 1 tablet (50 mg) by mouth 3 times daily 120 tablet 11     insulin aspart (NOVOLOG PEN) 100 UNIT/ML injection Less than 120 mg/dL = 0 Units  120-149 mg/dL = 1 Unit  150-199 mg/dL = 2 Units  200-249 mg/dL = 3 Units  250-299 mg/dL = 5 Units  300-349 mg/dL = 7 Units  350 mg/dL or greater = 8 Units       insulin glargine (LANTUS SOLOSTAR) 100 UNIT/ML pen Inject 20 Units Subcutaneous every evening 3 mL 11     isosorbide mononitrate (IMDUR) 30 MG 24 hr tablet Take 1 tablet (30 mg) by mouth daily 30 tablet 11     metFORMIN (GLUCOPHAGE) 500 MG tablet Take 500 mg by mouth 2 times daily (with meals)       metoprolol  "succinate (TOPROL-XL) 25 MG 24 hr tablet Take 0.5 tablets (12.5 mg) by mouth daily 30 tablet 11     nitroGLYcerin (NITROSTAT) 0.4 MG sublingual tablet Place 0.4 mg under the tongue every 5 minutes as needed for chest pain For chest pain place 1 tablet under the tongue every 5 minutes for 3 doses. If symptoms persist 5 minutes after 1st dose call 911.       rosuvastatin (CRESTOR) 20 MG tablet Take 1 tablet (20 mg) by mouth At Bedtime 30 tablet 11     warfarin (COUMADIN) 2 MG tablet Take 4 mg by mouth daily On 7/1 and 7/2, then recheck INR at [facility] and take as directed       EXAM:  /75 (BP Location: Left arm, Patient Position: Chair, Cuff Size: Adult Regular)  Pulse 74  Ht 1.626 m (5' 4\")  Wt 75.3 kg (166 lb 1.6 oz)  SpO2 98%  BMI 28.51 kg/m2  General: alert, articulate, and in no acute distress.  HEENT: normocephalic, atraumatic, anicteric sclera, EOMI, normal palate, mucosa moist, dentition intact, no cyanosis.    Neck: neck supple.  No adenopathy, masses, or carotid bruits.  JVP just above clavicle sitting upright.  At a 90 degree angle JVP is 10-11 cm.   Heart: regular rhythm, normal S1/S2, no murmur, gallop, rub.  Precordium quiet with normal PMI.     Lungs: clear, no rales, ronchi, or wheezing.  No accessory muscle use, respirations unlabored.   Abdomen: soft, non-tender, bowel sounds present, no hepatomegaly  Extremities: No pitting edema.   No cyanosis.  Extremities warm.  2+ pedal pulses.   Neurological: alert and oriented x 3.  normal speech and affect, no gross motor deficits  Skin:  No ecchymoses, rashes, or clubbing.    Labs:  CBC RESULTS:  Lab Results   Component Value Date    WBC 6.1 07/24/2018    RBC 3.45 (L) 07/24/2018    HGB 9.9 (L) 07/24/2018    HCT 32.6 (L) 07/24/2018    MCV 95 07/24/2018    MCH 28.7 07/24/2018    MCHC 30.4 (L) 07/24/2018    RDW 14.9 07/24/2018     07/24/2018       CMP RESULTS:  Lab Results   Component Value Date     08/06/2018 "    POTASSIUM 3.8 2018    CHLORIDE 105 2018    CO2 21 2018    ANIONGAP 11 2018     (H) 2018    BUN 43 (H) 2018    CR 1.57 (H) 2018    GFRESTIMATED 46 (L) 2018    GFRESTBLACK 56 (L) 2018    ARLENE 8.8 2018    BILITOTAL 1.0 2018    ALBUMIN 2.6 (L) 2018    ALKPHOS 121 2018    ALT 88 (H) 2018    AST 39 2018        INR RESULTS:  Lab Results   Component Value Date    INR 1.78 (H) 2018       No components found for: CK  Lab Results   Component Value Date    MAG 2.1 2018     No results found for: NTBNP    Most recent echocardiogram:  Recent Results (from the past 8760 hour(s))   ECHO COMPLETE WITH OPTISON    Narrative    [id number]  [id number]  [id number]  [id number]^STEFANIA^MANNY^V           [facility]  Echocardiography Laboratory  [address] [place] [id number]     Name: ADILSON RINCON  MRN: [id number]  : 1960  Study Date: 2018 11:27 AM  Age: 57 yrs  Gender: Male  [place] [facility]  [place] Study: Cardiac Arrest  Ordering Physician: MANNY AGUIAR  Referring Physician: SELF, REFERRED  Performed By: Laya Read RDCS     BSA: 1.8 m2  Height: 64 in  Weight: 170 lb  HR: 78  BP: 99/76 mmHg  _____________________________________________________________________________  __        Procedure  Echocardiogram with two-dimensional, color and spectral Doppler performed.  Contrast Optison. Optison (NDC #0039-7727-97) given intravenously. Patient was  given 5 ml mixture of 3 ml Optison and 6 ml saline. 4 ml wasted.  _____________________________________________________________________________  __        Interpretation Summary  Left ventricular wall thickness is normal. Borderline left ventricular  dilation is present. LVIDd is 5.19cm The Ejection Fraction is estimated at 20-  25%. The mid to distal anteroseptum, mid to distal anterior wall, mid to  distal  anterolateral wall and mid to apical septum and apex are akinetic. This  is consistent with large LAD territory infarct. Laminar thrombus is seen in  the LV apex  The right ventricle is normal size. Difficult to evaluate RV function but it  appears to be mild to moderately reduced.  Moderate tricuspid insufficiency is present. Right ventricular systolic  pressure is 26mmHg above the right atrial pressure.  Dilation of the inferior vena cava is present with normal respiratory  variation in diameter. Estimated mean right atrial pressure is 8 mmHg (mildly  elevated).  No pericardial effusion is present.     Previous study not available for comparison.  _____________________________________________________________________________  __        Left Ventricle  Left ventricular wall thickness is normal. Borderline left ventricular  dilation is present. LVIDd is 5.19cm. Grade III or advanced diastolic  dysfunction. The Ejection Fraction is estimated at 20-25%. The mid to distal  anteroseptum, mid to distal anterior  wall, mid to distal anterolateral wall and mid to apical septum and apex are  akinetic. This is consistent with large LAD territory infarct. Laminar  thrombus is seen in the LV apex.     Right Ventricle  Right ventricular wall thickness is normal. The right ventricle is normal  size. Difficult to evaluate RV function but it appears to be mild to  moderately reduced. A pacemaker lead is noted in the right ventricle.     Atria  Moderate left atrial enlargement is present. Moderate right atrial enlargement  is present. A pacemaker lead is noted in the right atrium.     Mitral Valve  The mitral valve is normal. Trace mitral insufficiency is present.        Aortic Valve  Aortic valve is normal in structure and function. The aortic valve is  tricuspid.     Tricuspid Valve  The tricuspid valve is normal. Moderate tricuspid insufficiency is present.  The right ventricular systolic pressure is approximated at 26.0 mmHg  plus the  right atrial pressure. Right ventricular systolic pressure is 26mmHg above the  right atrial pressure.     Pulmonic Valve  The pulmonic valve is normal. Trace pulmonic insufficiency is present.     Vessels  The aorta root is normal. Dilation of the inferior vena cava is present with  normal respiratory variation in diameter. Estimated mean right atrial pressure  is 8 mmHg (mildly elevated).     Pericardium  No pericardial effusion is present.        Compared to Previous Study  Previous study not available for comparison.  _____________________________________________________________________________  __  MMode/2D Measurements & Calculations  IVSd: 0.74 cm     LVIDd: 5.2 cm  LVIDs: 4.0 cm  LVPWd: 1.0 cm  FS: 23.3 %  LV mass(C)d: 167.6 grams  LV mass(C)dI: 91.8 grams/m2  asc Aorta Diam: 3.6 cm  LVOT diam: 2.1 cm  LVOT area: 3.5 cm2  LA Volume (BP): 81.1 ml  LA Volume Index (BP): 44.3 ml/m2  RWT: 0.40           Doppler Measurements & Calculations  MV E max sasha: 101.0 cm/sec  MV A max sasha: 16.5 cm/sec  MV E/A: 6.1  MV dec slope: 660.0 cm/sec2  PA V2 max: 96.5 cm/sec  PA max PG: 3.7 mmHg  PA acc time: 0.11 sec  TR max sasha: 255.0 cm/sec  TR max P.0 mmHg  E/E' av.8  Lateral E/e': 11.0  Medial E/e': 34.5     _____________________________________________________________________________  __           Report approved by: LEÓN Powell 2018 02:05 PM        6 MWT:  1260 feet    Cardiopulmonary Stress test:  Walked 7 min 22 seconds.  RER 1.1, VE/VCO2 slope: 48.24. /52 at rest, 105/59 exercise.    Assessment and Plan:    In summary, Mariusz is a 57-year-old gentleman with newly diagnosed ischemic cardiomyopathy who was referred for medication optimization.  He appears fairly euvolemic today.  I have increased his Toprol XL to 12.5 mg twice daily.  He will discontinue his ASA since he's been on triple therapy, and continue with Plavix and Warfarin per Dr. Ortiz's recommendations.     He will repeat  labs in 1 week.  If his creatinine looks ok, will plan to increase his afterload reduction further.  Will consider ACE/ARB as he is diabetic.   He will start cardiac rehab in [place].      I did spend a significant amount of time talking to him about monitoring symptoms and balancing activities to allow himself to heal and recover post MI.  His family voiced multiple concerns about him overdoing things on a daily basis.      1.  Chronic systolic heart failure secondary to ICM.  Stage C  NYHA Class IIIA  ACEi/ARB: yes, Hydralazine 50 mg three times daily.   BB: yes, Toprol XL 12.5 mg twice daily.   Aldosterone antagonist: contraindicated due to renal dysfunction  SCD prophylaxis: ICD  Fluid status: euvolemic  Anticoagulation: Warfarin INR goal 2-3  Antiplatelet:  ASA dose 81 mg daily.  Instructed him to discontinue after a month of therapy and continue with Plavix and Warfarin.   Sleep apnea: Not discussed.  Address at next visit.   NSAID use:  Contraindicated.  Avoid use.  Remote Monitoring:  None    2.  HTN:  Controlled at 109/75.  Continue Hydralazine and Toprol XL.    3.  CAD:  Stable.  Continue Hydralazine, Toprol XL, Plavix, and crestor.  Consider ace/arb if renal function stabilizes as he is diabetic.      4.  PAF:  Chads Vasc score:  Continue amiodarone Toprol XL and warfarin.     5.  VT:  Continue amiodarone and Toprol XL.  BiV ICD placed.    6.  Follow up: Dr. Ortiz in September with labs prior.    1 hour spent with patient with >50% spent in coordination of care    Pippa MORALES, SHAHRZAD  [facility]   [phone]    Please do not hesitate to contact me if you have any questions/concerns.     Sincerely,     Pippa Rodriguez NP

## 2018-08-06 NOTE — NURSING NOTE
Diet: Patient instructed regarding a heart healthy diet, including discussion of reduced fat and sodium intake. Patient demonstrated understanding of this information and agreed to call with further questions or concerns.  Labs: Patient was given results of the laboratory testing obtained today. Patient was instructed to return for the next laboratory testing in 1 week at [facility]. Patient demonstrated understanding of this information and agreed to call with further questions or concerns.   Return Appointment: Patient given instructions regarding scheduling next clinic visit. Patient demonstrated understanding of this information and agreed to call with further questions or concerns. I will call him in one week for a pulse/BP check  Medication Change: Patient was educated regarding prescribed medication change, including discussion of the indication, administration, side effects, and when to report to MD or RN. Patient demonstrated understanding of this information and agreed to call with further questions or concerns.  Patient stated he understood all health information given and agreed to call with further questions or concerns.     Sasha Schwartz RN

## 2018-08-06 NOTE — PATIENT INSTRUCTIONS
"You were seen today in the Cardiovascular Clinic at the [facility].     Cardiology Providers you saw during your visit: Pippa MORALES CNP      Follow up and medication changes:    1. Increase Toprol XL 12.5 mg two times a day.   2. Repeat Basic Metabolic Panel lab in one week. I will fax order to your lab  3. Check BP/Pulse for 1 week.   4. I will call you in a week after we have lab results and check in, possibly make medication adjustments at that time.   5. Follow up with Dr Ortiz as scheduled in September.       Results for ADILSON RINCON (MRN [id number]) as of 2018 14:54   Ref. Range 2018 10:25   Sodium Latest Ref Range: 133 - 144 mmol/L 137   Potassium Latest Ref Range: 3.4 - 5.3 mmol/L 3.8   Chloride Latest Ref Range: 94 - 109 mmol/L 105   Carbon Dioxide Latest Ref Range: 20 - 32 mmol/L 21   Urea Nitrogen Latest Ref Range: 7 - 30 mg/dL 43 (H)   Creatinine Latest Ref Range: 0.66 - 1.25 mg/dL 1.57 (H)   GFR Estimate Latest Ref Range: >60 mL/min/1.7m2 46 (L)   GFR Estimate If Black Latest Ref Range: >60 mL/min/1.7m2 56 (L)   Calcium Latest Ref Range: 8.5 - 10.1 mg/dL 8.8   Anion Gap Latest Ref Range: 3 - 14 mmol/L 11         Please limit your fluid intake to 2 L (68 ounces) daily.  2 Liters a day = 8.5 cups, or 68 ounces.  Please limit your salt intake to 2 grams a day or less.     If you gain 2# in 24 hours or 5# in one week call Sasha Schwartz RN so we can adjust your medications as needed over the phone.     Please feel free to call me with any questions or concerns.       Sasha Schwartz RN CHFN  [facility]  Cardiology Care Coordinator-Heart Failure Clinic     Questions and [phone].   First press #1 for the University and then press #3 for \"Medical Questions\" to reach us Cardiology Nurses.      On Call Cardiologist for after hours or on weekends: [phone]   option #4 and ask to speak to the on-call Cardiologist. Inform them you " are a [facility]/heart failure patient at the [place].           If you need a medication refill please contact your pharmacy.  Please allow 3 business days for your refill to be completed.  _______________________________________________________  [facility] Cardiomyopathy, Optimization, Rehabilitation, Education   The [facility] is a heart failure specialty clinic within the [facility] where you will work with specialized nurse practitioners dedicated to helping patients with heart failure carefully adjust medications, receive education, and learn who and when to call if symptoms develop. They specialize in helping you better understand your condition, slow the progression of your disease, improve the length and quality of your life, help you detect future heart problems before they become life threatening, and avoid hospitalizations.  As always, thank you for trusting us with your health care needs!

## 2018-08-06 NOTE — PROGRESS NOTES
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 Ortiz for newly diagnosed systolic heart failure.    Mariusz was recently hospitalized at [facility] from 7/16-7/24/18 for cardiogenic shock.  He presented from an outside hospital with ICD shocks for VT/VF and was reportedly found to be in cardiogenic shock.  He was transferred to [facility] for further cares on 7/16/2018 on a milrinone drip and with an IABP placed.  He developed an ANDREW on CKD, and his Milrinone was switched to dobutamine.  LVAD workup was completed inpatient.  Since he stabilized, LVAD placement was deferred and he was referred to [facility] for medication optimization.     Since his hospitalization, he has been feeling well.  He does get fatigued if he overdoes it.  He notices shortness of breath with carrying a load of laundry up a flight of stairs.  He gets fatigued after kneading pizza dough for a couple of minutes.  He denies shortness of breath at rest, PND, orthopnea, edema, weight gain, chest pain, palpitations, or near syncopal episodes.  He will feel lightheaded, and will have nausea and vomiting when he pushes himself too much.  He experienced a couple of these episodes after running errands for a couple of hours, then doing laundry, and cooking dinner.  After resting his symptoms resolved.  He been following salt and fluid restrictions.  His weight is down 3 pounds.     PAST MEDICAL HISTORY:  Past Medical History:   Diagnosis Date     Acute kidney injury (H)      CKD (chronic kidney disease)      Coronary artery disease      Diabetes mellitus (H)      HTN (hypertension)      Hyperlipidemia      Ischemic cardiomyopathy      Paroxysmal atrial flutter (H)      Systolic  heart failure (H)      Ventricular tachycardia (H)        FAMILY HISTORY:  No family history on file.    SOCIAL HISTORY:  Social History     Social History     Marital status: Single     Spouse name: N/A     Number of children: N/A     Years of education: N/A     Social History Main Topics     Smoking status: Never Smoker     Smokeless tobacco: Never Used     Alcohol use None     Drug use: None     Sexual activity: Not Asked     Other Topics Concern     None     Social History Narrative     None       CURRENT MEDICATIONS:  Current Outpatient Prescriptions   Medication Sig Dispense Refill     amiodarone (PACERONE/CODARONE) 200 MG tablet Take 1 tablet (200 mg) by mouth daily 60 tablet 11     aspirin 81 MG tablet Take 81 mg by mouth daily       clopidogrel (PLAVIX) 75 MG tablet Take 75 mg by mouth daily       digoxin (LANOXIN) 125 MCG tablet Take 1 tablet (125 mcg) by mouth daily 30 tablet 11     famotidine (PEPCID) 20 MG tablet Take 20 mg by mouth 2 times daily       hydrALAZINE (APRESOLINE) 50 MG tablet Take 1 tablet (50 mg) by mouth 3 times daily 120 tablet 11     insulin aspart (NOVOLOG PEN) 100 UNIT/ML injection Less than 120 mg/dL = 0 Units  120-149 mg/dL = 1 Unit  150-199 mg/dL = 2 Units  200-249 mg/dL = 3 Units  250-299 mg/dL = 5 Units  300-349 mg/dL = 7 Units  350 mg/dL or greater = 8 Units       insulin glargine (LANTUS SOLOSTAR) 100 UNIT/ML pen Inject 20 Units Subcutaneous every evening 3 mL 11     isosorbide mononitrate (IMDUR) 30 MG 24 hr tablet Take 1 tablet (30 mg) by mouth daily 30 tablet 11     metFORMIN (GLUCOPHAGE) 500 MG tablet Take 500 mg by mouth 2 times daily (with meals)       metoprolol succinate (TOPROL-XL) 25 MG 24 hr tablet Take 0.5 tablets (12.5 mg) by mouth daily 30 tablet 11     nitroGLYcerin (NITROSTAT) 0.4 MG sublingual tablet Place 0.4 mg under the tongue every 5 minutes as needed for chest pain For chest pain place 1 tablet under the tongue every 5 minutes for 3 doses. If symptoms  persist 5 minutes after 1st dose call 911.       rosuvastatin (CRESTOR) 20 MG tablet Take 1 tablet (20 mg) by mouth At Bedtime 30 tablet 11     warfarin (COUMADIN) 2 MG tablet Take 4 mg by mouth daily On 7/1 and 7/2, then recheck INR at [facility] and take as directed         ROS:  Answers for HPI/ROS submitted by the patient on 8/6/2018   General Symptoms: Yes  Skin Symptoms: No  HENT Symptoms: Yes  EYE SYMPTOMS: No  HEART SYMPTOMS: Yes  LUNG SYMPTOMS: Yes  INTESTINAL SYMPTOMS: No  URINARY SYMPTOMS: No  REPRODUCTIVE SYMPTOMS: Yes  SKELETAL SYMPTOMS: No  BLOOD SYMPTOMS: No  NERVOUS SYSTEM SYMPTOMS: Yes  MENTAL HEALTH SYMPTOMS: Yes  Fever: Yes  Loss of appetite: No  Weight loss: Yes  Weight gain: No  Fatigue: Yes  Night sweats: No  Chills: No  Increased stress: No  Excessive hunger: No  Excessive thirst: No  Feeling hot or cold when others believe the temperature is normal: No  Loss of height: No  Post-operative complications: No  Surgical site pain: No  Hallucinations: No  Change in or Loss of Energy: No  Hyperactivity: No  Confusion: No  Ear pain: No  Ear discharge: No  Hearing loss: No  Tinnitus: No  Nosebleeds: Yes  Congestion: No  Sinus pain: No  Trouble swallowing: No   Voice hoarseness: No  Mouth sores: No  Sore throat: No  Tooth pain: No  Gum tenderness: No  Bleeding gums: No  Change in taste: No  Change in sense of smell: No  Dry mouth: No  Hearing aid used: No  Neck lump: No  Cough: Yes  Sputum or phlegm: Yes  Coughing up blood: No  Difficulty breating or shortness of breath: Yes  Snoring: Yes  Wheezing: Yes  Difficulty breathing on exertion: No  Nighttime Cough: Yes  Difficulty breathing when lying flat: No  Chest pain or pressure: No  Fast or irregular heartbeat: No  Pain in legs with walking: No  Trouble breathing while lying down: Yes  Fingers or toes appear blue: No  High blood pressure: No  Low blood pressure: Yes  Fainting: No  Murmurs: No  Pacemaker: No  Varicose veins:  "No  Edema or swelling: No  Wake up at night with shortness of breath: No  Light-headedness: Yes  Exercise intolerance: Yes  Trouble with coordination: No  Dizziness or trouble with balance: Yes  Fainting or black-out spells: No  Memory loss: No  Headache: No  Seizures: No  Speech problems: No  Tingling: Yes  Tremor: No  Weakness: No  Difficulty walking: No  Paralysis: No  Numbness: No  Scrotal pain or swelling: No  Erectile dysfunction: No  Penile discharge: No  Genital ulcers: No  Reduced libido: Yes  Nervous or Anxious: No  Depression: No  Trouble sleeping: Yes  Trouble thinking or concentrating: No  Mood changes: No  Panic attacks: No  PHQ-2 Score: 0    EXAM:  /75 (BP Location: Left arm, Patient Position: Chair, Cuff Size: Adult Regular)  Pulse 74  Ht 1.626 m (5' 4\")  Wt 75.3 kg (166 lb 1.6 oz)  SpO2 98%  BMI 28.51 kg/m2  General: alert, articulate, and in no acute distress.  HEENT: normocephalic, atraumatic, anicteric sclera, EOMI, normal palate, mucosa moist, dentition intact, no cyanosis.    Neck: neck supple.  No adenopathy, masses, or carotid bruits.  JVP just above clavicle sitting upright.  At a 90 degree angle JVP is 10-11 cm.   Heart: regular rhythm, normal S1/S2, no murmur, gallop, rub.  Precordium quiet with normal PMI.     Lungs: clear, no rales, ronchi, or wheezing.  No accessory muscle use, respirations unlabored.   Abdomen: soft, non-tender, bowel sounds present, no hepatomegaly  Extremities: No pitting edema.   No cyanosis.  Extremities warm.  2+ pedal pulses.   Neurological: alert and oriented x 3.  normal speech and affect, no gross motor deficits  Skin:  No ecchymoses, rashes, or clubbing.    Labs:  CBC RESULTS:  Lab Results   Component Value Date    WBC 6.1 07/24/2018    RBC 3.45 (L) 07/24/2018    HGB 9.9 (L) 07/24/2018    HCT 32.6 (L) 07/24/2018    MCV 95 07/24/2018    MCH 28.7 07/24/2018    MCHC 30.4 (L) 07/24/2018    RDW 14.9 07/24/2018     07/24/2018       CMP " RESULTS:  Lab Results   Component Value Date     2018    POTASSIUM 3.8 2018    CHLORIDE 105 2018    CO2 21 2018    ANIONGAP 11 2018     (H) 2018    BUN 43 (H) 2018    CR 1.57 (H) 2018    GFRESTIMATED 46 (L) 2018    GFRESTBLACK 56 (L) 2018    ARLENE 8.8 2018    BILITOTAL 1.0 2018    ALBUMIN 2.6 (L) 2018    ALKPHOS 121 2018    ALT 88 (H) 2018    AST 39 2018        INR RESULTS:  Lab Results   Component Value Date    INR 1.78 (H) 2018       No components found for: CK  Lab Results   Component Value Date    MAG 2.1 2018     No results found for: NTBNP    Most recent echocardiogram:  Recent Results (from the past 8760 hour(s))   ECHO COMPLETE WITH OPTISON    Narrative    [id number]  [id number]  [id number]  [id number]^STEFANIA^MANNY^V           [facility],[place]  Echocardiography Laboratory  [address] [place], [place] [address]     Name: ADILSON RINCON  MRN: [id number]  : 1960  Study Date: 2018 11:27 AM  Age: 57 yrs  Gender: Male  Patient Location: [facility]  Reason For Study: Cardiac Arrest  Ordering Physician: MANNY AGUIAR  Referring Physician: SELF, REFERRED  Performed By: Laya Read RDCS     BSA: 1.8 m2  Height: 64 in  Weight: 170 lb  HR: 78  BP: 99/76 mmHg  _____________________________________________________________________________  __        Procedure  Echocardiogram with two-dimensional, color and spectral Doppler performed.  Contrast Optison. Optison (NDC #6424-2358-66) given intravenously. Patient was  given 5 ml mixture of 3 ml Optison and 6 ml saline. 4 ml wasted.  _____________________________________________________________________________  __        Interpretation Summary  Left ventricular wall thickness is normal. Borderline left ventricular  dilation is present. LVIDd is 5.19cm The Ejection Fraction is estimated at 20-  25%. The mid to  distal anteroseptum, mid to distal anterior wall, mid to  distal anterolateral wall and mid to apical septum and apex are akinetic. This  is consistent with large LAD territory infarct. Laminar thrombus is seen in  the LV apex  The right ventricle is normal size. Difficult to evaluate RV function but it  appears to be mild to moderately reduced.  Moderate tricuspid insufficiency is present. Right ventricular systolic  pressure is 26mmHg above the right atrial pressure.  Dilation of the inferior vena cava is present with normal respiratory  variation in diameter. Estimated mean right atrial pressure is 8 mmHg (mildly  elevated).  No pericardial effusion is present.     Previous study not available for comparison.  _____________________________________________________________________________  __        Left Ventricle  Left ventricular wall thickness is normal. Borderline left ventricular  dilation is present. LVIDd is 5.19cm. Grade III or advanced diastolic  dysfunction. The Ejection Fraction is estimated at 20-25%. The mid to distal  anteroseptum, mid to distal anterior  wall, mid to distal anterolateral wall and mid to apical septum and apex are  akinetic. This is consistent with large LAD territory infarct. Laminar  thrombus is seen in the LV apex.     Right Ventricle  Right ventricular wall thickness is normal. The right ventricle is normal  size. Difficult to evaluate RV function but it appears to be mild to  moderately reduced. A pacemaker lead is noted in the right ventricle.     Atria  Moderate left atrial enlargement is present. Moderate right atrial enlargement  is present. A pacemaker lead is noted in the right atrium.     Mitral Valve  The mitral valve is normal. Trace mitral insufficiency is present.        Aortic Valve  Aortic valve is normal in structure and function. The aortic valve is  tricuspid.     Tricuspid Valve  The tricuspid valve is normal. Moderate tricuspid insufficiency is present.  The  right ventricular systolic pressure is approximated at 26.0 mmHg plus the  right atrial pressure. Right ventricular systolic pressure is 26mmHg above the  right atrial pressure.     Pulmonic Valve  The pulmonic valve is normal. Trace pulmonic insufficiency is present.     Vessels  The aorta root is normal. Dilation of the inferior vena cava is present with  normal respiratory variation in diameter. Estimated mean right atrial pressure  is 8 mmHg (mildly elevated).     Pericardium  No pericardial effusion is present.        Compared to Previous Study  Previous study not available for comparison.  _____________________________________________________________________________  __  MMode/2D Measurements & Calculations  IVSd: 0.74 cm     LVIDd: 5.2 cm  LVIDs: 4.0 cm  LVPWd: 1.0 cm  FS: 23.3 %  LV mass(C)d: 167.6 grams  LV mass(C)dI: 91.8 grams/m2  asc Aorta Diam: 3.6 cm  LVOT diam: 2.1 cm  LVOT area: 3.5 cm2  LA Volume (BP): 81.1 ml  LA Volume Index (BP): 44.3 ml/m2  RWT: 0.40           Doppler Measurements & Calculations  MV E max sasha: 101.0 cm/sec  MV A max sasha: 16.5 cm/sec  MV E/A: 6.1  MV dec slope: 660.0 cm/sec2  PA V2 max: 96.5 cm/sec  PA max PG: 3.7 mmHg  PA acc time: 0.11 sec  TR max sasha: 255.0 cm/sec  TR max P.0 mmHg  E/E' av.8  Lateral E/e': 11.0  Medial E/e': 34.5     _____________________________________________________________________________  __           Report approved by: LEÓN Powell 2018 02:05 PM        6 MWT:  1260 feet    Cardiopulmonary Stress test:  Walked 7 min 22 seconds.  RER 1.1, VE/VCO2 slope: 48.24. /52 at rest, 105/59 exercise.    Assessment and Plan:    In summary, Mariusz is a 57-year-old gentleman with newly diagnosed ischemic cardiomyopathy who was referred for medication optimization.  He appears fairly euvolemic today.  I have increased his Toprol XL to 12.5 mg twice daily.  He will discontinue his ASA since he's been on triple therapy, and continue with Plavix and  Warfarin per Dr. Ortiz's recommendations.     He will repeat labs in 1 week.  If his creatinine looks ok, will plan to increase his afterload reduction further.  Will consider ACE/ARB as he is diabetic.   He will start cardiac rehab in [place].      I did spend a significant amount of time talking to him about monitoring symptoms and balancing activities to allow himself to heal and recover post MI.  His family voiced multiple concerns about him overdoing things on a daily basis.      1.  Chronic systolic heart failure secondary to ICM.  Stage C  NYHA Class IIIA  ACEi/ARB: yes, Hydralazine 50 mg three times daily.   BB: yes, Toprol XL 12.5 mg twice daily.   Aldosterone antagonist: contraindicated due to renal dysfunction  SCD prophylaxis: ICD  Fluid status: euvolemic  Anticoagulation: Warfarin INR goal 2-3  Antiplatelet:  ASA dose 81 mg daily.  Instructed him to discontinue after a month of therapy and continue with Plavix and Warfarin.   Sleep apnea: Not discussed.  Address at next visit.   NSAID use:  Contraindicated.  Avoid use.  Remote Monitoring:  None    2.  HTN:  Controlled at 109/75.  Continue Hydralazine and Toprol XL.    3.  CAD:  Stable.  Continue Hydralazine, Toprol XL, Plavix, and crestor.  Consider ace/arb if renal function stabilizes as he is diabetic.      4.  PAF:  Chads Vasc score:  Continue amiodarone Toprol XL and warfarin.     5.  VT:  Continue amiodarone and Toprol XL.  BiV ICD placed.    6.  Follow up: Dr. Ortiz in September with labs prior.    1 hour spent with patient with >50% spent in coordination of care      Pippa MORALES, SHAHRZAD  [facility]   [phone]

## 2018-08-14 ENCOUNTER — CARE COORDINATION (OUTPATIENT)
Dept: CARDIOLOGY | Facility: CLINIC | Age: 58
End: 2018-08-14

## 2018-08-14 NOTE — PROGRESS NOTES
Called Mariusz to review blood pressures and follow up on labs.     His pressures, and weights are as follows over the last week:. His pulse has been consistently high 60s to low 70s.     Tuesday 8/14: 104/70, 165.6 lb  Saturday 8/11: 84/68 164.4 lb  Friday 8/10: 90/60 164.2 lb  Thursday 8/9 88/60 164.6 lb  Wednesday 8/8 82/68 165.6 lb  Tuesday 8/7 90/68 165.4 lb    He denies any dizziness, lightheadedness or chest pain with those low blood pressures. He denies SOB/RADER, LE edema. He is sleeping ok, though states sometimes he coughs a lot when he lays on L or R side. No PND.   He hadn't gotten labs yet but is going this afternoon. Will follow up on those.     Sasha Schwartz RN

## 2018-08-15 LAB
ANION GAP SERPL CALCULATED.3IONS-SCNC: 9 MMOL/L (ref 5–13)
BUN SERPL-MCNC: 29 MG/DL (ref 8–23)
CALCIUM SERPL-MCNC: 9.5 MG/DL (ref 8.5–10.5)
CHLORIDE SERPLBLD-SCNC: 106 MEQ/L (ref 98–107)
CO2 SERPL-SCNC: 23 MEQ/L (ref 23–32)
CREAT SERPL-MCNC: 1.78 MG/DL (ref 0.8–1.5)
GFR SERPL CREATININE-BSD FRML MDRD: 40 ML/MIN/1.73M2
GLUCOSE SERPL-MCNC: 155 MG/DL (ref 60–99)
POTASSIUM SERPL-SCNC: 4.5 MEQ/L (ref 3.5–5.1)
SODIUM SERPL-SCNC: 138 MEQ/L (ref 136–145)

## 2018-08-16 ENCOUNTER — CARE COORDINATION (OUTPATIENT)
Dept: CARDIOLOGY | Facility: CLINIC | Age: 58
End: 2018-08-16

## 2018-08-16 DIAGNOSIS — I50.22 CHRONIC SYSTOLIC HEART FAILURE (H): Primary | ICD-10-CM

## 2018-08-16 RX ORDER — FUROSEMIDE 20 MG
10 TABLET ORAL 2 TIMES DAILY
Qty: 30 TABLET | Refills: 0 | Status: SHIPPED | OUTPATIENT
Start: 2018-08-16 | End: 2018-09-11

## 2018-08-16 NOTE — PROGRESS NOTES
Left Mariusz a voicemail to review Pippa's recommendations to start Lasix 10 mg twice daily. Repeat labs in one week. Asked for call back.   Sasha Schwartz RN

## 2018-08-16 NOTE — PROGRESS NOTES
Reviewed labs with Daniel. Gave following order:  Date: 8/16/2018    Time of Call: 12:56 PM     Diagnosis:  Systolic heart failure     [ VORB ] Ordering provider: Pippa MORALES CNP   Order: Start Lasix 10 mg two times a day. BMP and BNP in one week.      Order received by: Sasha DONALD      Follow-up/additional notes: Mariusz is going on vacation starting next week. Will get labs when he gets back on the 27th. Instructed to bring our phone number on vacation with him in case he has any symptoms. Informed to please call us if he has weight gain of 2 lbs in a day or 5 lbs in a week, any increased swelling, or shortness of breath. Patient verbalizes understanding and agrees with plan of care. Sasha Schwartz RN

## 2018-08-27 ENCOUNTER — CARE COORDINATION (OUTPATIENT)
Dept: CARDIOLOGY | Facility: CLINIC | Age: 58
End: 2018-08-27

## 2018-08-27 NOTE — PROGRESS NOTES
Called Mariusz to make sure he's getting his labs today. He already was drawn and we will watch for results. He states he is doing well, though reports nausea and poor appetite last few days. Weights have been stable 165-166 lbs. No change in breathing. Blood pressures continue to run low. 90/52 this morning. Last week he had a 6 minute walk at cardiac rehab and pre-exercise SBP was 106. Post exercise BP was 80/60. Also complains of brief episodes of chest pain (2-3/10) that happen at rest when he is sitting down. They come and go quickly and describes them as a dull ache.  Told him I would let Pippa Rodriguez NP know and we would watch for lab results.     Sasha Schwartz RN

## 2018-08-28 ENCOUNTER — TELEPHONE (OUTPATIENT)
Dept: CARDIOLOGY | Facility: CLINIC | Age: 58
End: 2018-08-28

## 2018-08-28 DIAGNOSIS — I50.23 ACUTE ON CHRONIC SYSTOLIC HEART FAILURE (H): Primary | ICD-10-CM

## 2018-08-28 NOTE — TELEPHONE ENCOUNTER
[facility] Call Center    Phone Message    May a detailed message be left on voicemail: yes    Reason for Call: Other: Pt calling back re: appt. They were not able to make an appt for pt to see Dr. Dodd in [place]. I did have appts for tomorrow but they were at 7 or 7:30 AM and pt can't make it that early. Please call if he can be fit in; otherwise, he will go to urgent care.     Action Taken: Message routed to:  [facility] ([facility]

## 2018-08-28 NOTE — TELEPHONE ENCOUNTER
Called patient back. Scheduled with Antonia Byrne tomorrow at 1:00 with labs prior. In meantime if symptoms worsen present to local ER. Mariusz states understanding and agrees with plan.   Sasha Schwartz RN

## 2018-08-29 ENCOUNTER — ALLIED HEALTH/NURSE VISIT (OUTPATIENT)
Dept: CARDIOLOGY | Facility: CLINIC | Age: 58
End: 2018-08-29
Attending: NURSE PRACTITIONER
Payer: COMMERCIAL

## 2018-08-29 VITALS
OXYGEN SATURATION: 96 % | BODY MASS INDEX: 29.06 KG/M2 | DIASTOLIC BLOOD PRESSURE: 76 MMHG | WEIGHT: 170.2 LBS | HEIGHT: 64 IN | HEART RATE: 63 BPM | SYSTOLIC BLOOD PRESSURE: 108 MMHG

## 2018-08-29 DIAGNOSIS — I47.20 VENTRICULAR TACHYCARDIA (H): Primary | ICD-10-CM

## 2018-08-29 DIAGNOSIS — I50.23 ACUTE ON CHRONIC SYSTOLIC HEART FAILURE (H): ICD-10-CM

## 2018-08-29 DIAGNOSIS — I50.9 HEART FAILURE (H): Primary | ICD-10-CM

## 2018-08-29 DIAGNOSIS — I49.01 VENTRICULAR FIBRILLATION (H): ICD-10-CM

## 2018-08-29 LAB
ANION GAP SERPL CALCULATED.3IONS-SCNC: 10 MMOL/L (ref 3–14)
BUN SERPL-MCNC: 31 MG/DL (ref 7–30)
CALCIUM SERPL-MCNC: 8.7 MG/DL (ref 8.5–10.1)
CHLORIDE SERPL-SCNC: 106 MMOL/L (ref 94–109)
CO2 SERPL-SCNC: 24 MMOL/L (ref 20–32)
CREAT SERPL-MCNC: 1.61 MG/DL (ref 0.66–1.25)
GFR SERPL CREATININE-BSD FRML MDRD: 45 ML/MIN/1.7M2
GLUCOSE SERPL-MCNC: 134 MG/DL (ref 70–99)
NT-PROBNP SERPL-MCNC: 3355 PG/ML (ref 0–125)
POTASSIUM SERPL-SCNC: 4 MMOL/L (ref 3.4–5.3)
SODIUM SERPL-SCNC: 139 MMOL/L (ref 133–144)

## 2018-08-29 PROCEDURE — 83880 ASSAY OF NATRIURETIC PEPTIDE: CPT | Performed by: NURSE PRACTITIONER

## 2018-08-29 PROCEDURE — 93282 PRGRMG EVAL IMPLANTABLE DFB: CPT | Mod: ZF

## 2018-08-29 PROCEDURE — 36415 COLL VENOUS BLD VENIPUNCTURE: CPT | Performed by: NURSE PRACTITIONER

## 2018-08-29 PROCEDURE — 93296 REM INTERROG EVL PM/IDS: CPT | Mod: ZF | Performed by: NURSE PRACTITIONER

## 2018-08-29 PROCEDURE — 99214 OFFICE O/P EST MOD 30 MIN: CPT | Mod: 25 | Performed by: NURSE PRACTITIONER

## 2018-08-29 PROCEDURE — 80048 BASIC METABOLIC PNL TOTAL CA: CPT | Performed by: NURSE PRACTITIONER

## 2018-08-29 PROCEDURE — 93282 PRGRMG EVAL IMPLANTABLE DFB: CPT | Mod: 26 | Performed by: INTERNAL MEDICINE

## 2018-08-29 PROCEDURE — G0463 HOSPITAL OUTPT CLINIC VISIT: HCPCS | Mod: ZF

## 2018-08-29 ASSESSMENT — PAIN SCALES - GENERAL: PAINLEVEL: NO PAIN (0)

## 2018-08-29 NOTE — MR AVS SNAPSHOT
After Visit Summary   2018    Adilson Sharp    MRN: [id number]           Patient Information     Date Of Birth          1960        Visit Information        Provider Department      2018 1:00 PM Antonia Byrne APRN CNP [facility]        Today's Diagnoses     Ventricular tachycardia (H)    -  1    Ventricular fibrillation (H)          Care Instructions    You were seen today in the Cardiovascular Clinic at the [facility].     Cardiology Providers you saw during your visit: Antonia MORALES CNP      Follow up and medication changes:  1. Follow up with Dr. Ortiz as scheduled 18.    Results for ADILSON SHARP (MRN [id number]) as of 2018 13:15   Ref. Range 2018 11:49   Sodium Latest Ref Range: 133 - 144 mmol/L 139   Potassium Latest Ref Range: 3.4 - 5.3 mmol/L 4.0   Chloride Latest Ref Range: 94 - 109 mmol/L 106   Carbon Dioxide Latest Ref Range: 20 - 32 mmol/L 24   Urea Nitrogen Latest Ref Range: 7 - 30 mg/dL 31 (H)   Creatinine Latest Ref Range: 0.66 - 1.25 mg/dL 1.61 (H)   GFR Estimate Latest Ref Range: >60 mL/min/1.7m2 45 (L)   GFR Estimate If Black Latest Ref Range: >60 mL/min/1.7m2 54 (L)   Calcium Latest Ref Range: 8.5 - 10.1 mg/dL 8.7   Anion Gap Latest Ref Range: 3 - 14 mmol/L 10   N-Terminal Pro Bnp Latest Ref Range: 0 - 125 pg/mL 3355 (H)   Glucose Latest Ref Range: 70 - 99 mg/dL 134 (H)         Please limit your fluid intake to 2 L (68 ounces) daily.  2 Liters a day = 8.5 cups, or 68 ounces.  Please limit your salt intake to 2 grams a day or less.     If you gain 2# in 24 hours or 5# in one week call Sasha Schwartz RN so we can adjust your medications as needed over the phone.     Please feel free to call me with any questions or concerns.       Sasha Schwartz RN [facility]  [facility]  Cardiology Care Coordinator-Heart Failure Clinic     Questions and [phone].   First press #1 for the  "[place] and then press #3 for \"Medical Questions\" to reach us Cardiology Nurses.      On Call Cardiologist for after hours or on weekends: [phone]   option #4 and ask to speak to the on-call Cardiologist. Inform them you are a CORE/heart failure patient at the [place].           If you need a medication refill please contact your pharmacy.  Please allow 3 business days for your refill to be completed.  _______________________________________________________  C.O.R.E. CLINIC Cardiomyopathy, Optimization, Rehabilitation, Education   The C.O.R.E. CLINIC is a heart failure specialty clinic within the [facility] where you will work with specialized nurse practitioners dedicated to helping patients with heart failure carefully adjust medications, receive education, and learn who and when to call if symptoms develop. They specialize in helping you better understand your condition, slow the progression of your disease, improve the length and quality of your life, help you detect future heart problems before they become life threatening, and avoid hospitalizations.  As always, thank you for trusting us with your health care needs!             Follow-ups after your visit        Your next 10 appointments already scheduled     Aug 29, 2018  3:30 PM CDT   (Arrive by 3:15 PM)   Implanted Defibulator with Uc Cv Device 1   [facility]    [address] [place] [address]  [address] [place] [id number] [phone]            Sep 12, 2018  2:30 PM CDT   Lab with UC LAB   [facility] ([facility])    [address] [place] [address]  1st Floor  [facility] [address] [phone]            Sep 12, 2018  3:00 PM CDT   (Arrive by 2:45 PM)   NEW HEART FAILURE with Jenni Ortiz MD   [facility])    [address] [place] [address]  [address] [place] [address] [phone]         " "     Who to contact     If you have questions or need follow up information about today's clinic visit or your schedule please contact [facility] directly at [phone].  Normal or non-critical lab and imaging results will be communicated to you by MyChart, letter or phone within 4 business days after the clinic has received the results. If you do not hear from us within 7 days, please contact the clinic through Intiguahart or phone. If you have a critical or abnormal lab result, we will notify you by phone as soon as possible.  Submit refill requests through ARKeX or call your pharmacy and they will forward the refill request to us. Please allow 3 business days for your refill to be completed.          Additional Information About Your Visit        [email or web address] Information     ARKeX lets you send messages to your doctor, view your test results, renew your prescriptions, schedule appointments and more. To sign up, go to [email or web address] . Click on \"Log in\" on the left side of the screen, which will take you to the Welcome page. Then click on \"Sign up Now\" on the right side of the page.     You will be asked to enter the access code listed below, as well as some personal information. Please follow the directions to create your username and password.     Your access code is: [email or web address]  Expires: 10/15/2018  5:33 PM     Your access code will  in 90 days. If you need help or a new code, please call your [place] clinic or [phone].        Care EveryWhere ID     This is your Care EveryWhere ID. This could be used by other organizations to access your [place] medical records  [id number]        Your Vitals Were     Pulse Height Pulse Oximetry BMI (Body Mass Index)          63 1.626 m (5' 4\") 96% 29.21 kg/m2         Blood Pressure from Last 3 Encounters:   18 108/76   18 109/75   18 96/70    Weight from Last 3 Encounters:   18 77.2 kg (170 lb 3.2 oz)   18 75.3 kg " (166 lb 1.6 oz)   07/24/18 77 kg (169 lb 12.8 oz)              We Performed the Following     INTERROGATION DEVICE EVAL REMOTE, PACER/ICD          Today's Medication Changes          These changes are accurate as of 8/29/18  1:54 PM.  If you have any questions, ask your nurse or doctor.               Stop taking these medicines if you haven't already. Please contact your care team if you have questions.     aspirin 81 MG tablet   Stopped by:  Antonia Byrne APRN CNP                    Primary Care Provider Office Phone # Fax #    [facility]stephania [facility] [place] [phone]       [address] [place] [address]        Equal Access to Services     [facility] : Hadii aad ku hadasho Sovicky, waaxda luqadaha, qaybta kaalmada adegalileoyada, mynor bar . So [facility] [phone].    ATENCIÓN: Si habla español, tiene a waddell disposición servicios gratuitos de asistencia lingüística. Llame al [phone].    We comply with applicable federal civil rights laws and [place] laws. We do not discriminate on the basis of race, color, national origin, age, disability, sex, sexual orientation, or gender identity.            Thank you!     Thank you for choosing [facility]  for your care. Our goal is always to provide you with excellent care. Hearing back from our patients is one way we can continue to improve our services. Please take a few minutes to complete the written survey that you may receive in the mail after your visit with us. Thank you!             Your Updated Medication List - Protect others around you: Learn how to safely use, store and throw away your medicines at www.disposemymeds.org.          This list is accurate as of 8/29/18  1:54 PM.  Always use your most recent med list.                   Brand Name Dispense Instructions for use Diagnosis    amiodarone 200 MG tablet    PACERONE/CODARONE    60 tablet    Take 1 tablet (200 mg) by mouth daily    Acute systolic  heart failure (H)       clopidogrel 75 MG tablet    PLAVIX     Take 75 mg by mouth daily        digoxin 125 MCG tablet    LANOXIN    30 tablet    Take 1 tablet (125 mcg) by mouth daily    Acute systolic heart failure (H)       famotidine 20 MG tablet    PEPCID     Take 20 mg by mouth 2 times daily        furosemide 20 MG tablet    LASIX    30 tablet    Take 0.5 tablets (10 mg) by mouth 2 times daily    Chronic systolic heart failure (H)       hydrALAZINE 50 MG tablet    APRESOLINE    120 tablet    Take 1 tablet (50 mg) by mouth 3 times daily    Acute systolic heart failure (H)       insulin aspart 100 UNIT/ML injection    NovoLOG PEN     Less than 120 mg/dL = 0 Units 120-149 mg/dL = 1 Unit 150-199 mg/dL = 2 Units 200-249 mg/dL = 3 Units 250-299 mg/dL = 5 Units 300-349 mg/dL = 7 Units 350 mg/dL or greater = 8 Units        insulin glargine 100 UNIT/ML injection    LANTUS    3 mL    Inject 20 Units Subcutaneous every evening    Type 2 diabetes mellitus with hyperglycemia, with long-term current use of insulin (H)       isosorbide mononitrate 30 MG 24 hr tablet    IMDUR    30 tablet    Take 1 tablet (30 mg) by mouth daily    Acute systolic heart failure (H)       metFORMIN 500 MG tablet    GLUCOPHAGE     Take 500 mg by mouth 2 times daily (with meals)        metoprolol succinate 25 MG 24 hr tablet    TOPROL-XL    30 tablet    Take 0.5 tablets (12.5 mg) by mouth 2 times daily        NITROSTAT 0.4 MG sublingual tablet   Generic drug:  nitroGLYcerin      Place 0.4 mg under the tongue every 5 minutes as needed for chest pain For chest pain place 1 tablet under the tongue every 5 minutes for 3 doses. If symptoms persist 5 minutes after 1st dose call 911.        rosuvastatin 20 MG tablet    CRESTOR    30 tablet    Take 1 tablet (20 mg) by mouth At Bedtime    Acute systolic heart failure (H)       warfarin 2 MG tablet    COUMADIN     Take 4 mg by mouth daily On 7/1 and 7/2, then recheck INR at [facility]  Clinic and take as directed

## 2018-08-29 NOTE — NURSING NOTE
Chief Complaint   Patient presents with     Follow Up For     Return CORE; 57 year old male with HFrEF with EF 15%, presents for initial visit with labs prior.       Vitals were taken and medications were reconciled.     Leeanna Olivarez MA    12:21 PM

## 2018-08-29 NOTE — PATIENT INSTRUCTIONS
"You were seen today in the Cardiovascular Clinic at the [facility].     Cardiology Providers you saw during your visit: Antonia MORALES CNP      Follow up and medication changes:  1. Follow up with Dr. Ortiz as scheduled 18.    Results for ADILSON RINCON (MRN [id number]) as of 2018 13:15   Ref. Range 2018 11:49   Sodium Latest Ref Range: 133 - 144 mmol/L 139   Potassium Latest Ref Range: 3.4 - 5.3 mmol/L 4.0   Chloride Latest Ref Range: 94 - 109 mmol/L 106   Carbon Dioxide Latest Ref Range: 20 - 32 mmol/L 24   Urea Nitrogen Latest Ref Range: 7 - 30 mg/dL 31 (H)   Creatinine Latest Ref Range: 0.66 - 1.25 mg/dL 1.61 (H)   GFR Estimate Latest Ref Range: >60 mL/min/1.7m2 45 (L)   GFR Estimate If Black Latest Ref Range: >60 mL/min/1.7m2 54 (L)   Calcium Latest Ref Range: 8.5 - 10.1 mg/dL 8.7   Anion Gap Latest Ref Range: 3 - 14 mmol/L 10   N-Terminal Pro Bnp Latest Ref Range: 0 - 125 pg/mL 3355 (H)   Glucose Latest Ref Range: 70 - 99 mg/dL 134 (H)         Please limit your fluid intake to 2 L (68 ounces) daily.  2 Liters a day = 8.5 cups, or 68 ounces.  Please limit your salt intake to 2 grams a day or less.     If you gain 2# in 24 hours or 5# in one week call Sasha Schwartz RN so we can adjust your medications as needed over the phone.     Please feel free to call me with any questions or concerns.       Sasha Schwartz RN CHFN  [facility]  Cardiology Care Coordinator-Heart Failure Clinic     Questions and [phone].   First press #1 for the [place] and then press #3 for \"Medical Questions\" to reach us Cardiology Nurses.      On Call Cardiologist for after hours or on weekends: [phone]   option #4 and ask to speak to the on-call Cardiologist. Inform them you are a CORE/heart failure patient at the [place].           If you need a medication refill please contact your pharmacy.  Please allow 3 business days for your refill to be " completed.  _______________________________________________________  C.O.R.E. CLINIC Cardiomyopathy, Optimization, Rehabilitation, Education   The C.O.R.E. CLINIC is a heart failure specialty clinic within the [facility] where you will work with specialized nurse practitioners dedicated to helping patients with heart failure carefully adjust medications, receive education, and learn who and when to call if symptoms develop. They specialize in helping you better understand your condition, slow the progression of your disease, improve the length and quality of your life, help you detect future heart problems before they become life threatening, and avoid hospitalizations.  As always, thank you for trusting us with your health care needs!

## 2018-08-29 NOTE — PROGRESS NOTES
Preliminary Device Interrogation Results.  Final physician signed paceart report to be scanned and attached.    Pt seen in the CVC for evaluation and iterative programming of a [employer], single lead ICD, per Antonia Byrne NP's orders. Today his intrinsic rhythm is SR 66 bpm. Normal ICD function. No arrhythmias have been recorded since the shocks on 7/16/18. = 0%. Lead trends appear stable. Pt reports that he is feeling well. Battery estimates 12 years to QUIQUE. Plan for pt to continue care with his primary device clinic in [place].  Single lead ICD

## 2018-08-29 NOTE — PROGRESS NOTES
HPI:   Mr. 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 He presents to clinic for routine follow up.     We recently received notification from Cardiac Rehab due concerns of hypotension in setting of exercise with nausea and decreased appetite. He notes nausea bi-weekly. He complains of mild substernal chest pain lasting 1-3 seconds when he was at rest, which resolved on its own. He is unsure of recurrence, but crispin any pain today. He denies palpitations, SOB, diaphoresis, or nausea with the pain. He notes occasional lightheadedness, but does not limit his activity. He also complains of white out vision changes twice in the last week after falling asleep in the car. He is able to walk about 5-7 minutes prior to needing rest. He denies fever, chills, chest pain, palpitations, SOB, RADER, PND, orthopnea, vomiting, diarrhea, hematochezia, melena, or LE edema. His weight remains stable at 164-166 lbs. He continues to follow a low sodium diet. His home BP log notes SBP in the 90's.        PAST MEDICAL HISTORY:  Past Medical History:   Diagnosis Date     Acute kidney injury (H)      CKD (chronic kidney disease)      Coronary artery disease      Diabetes mellitus (H)      HTN (hypertension)      Hyperlipidemia      Ischemic cardiomyopathy      Paroxysmal atrial flutter (H)      Systolic heart failure (H)      Ventricular tachycardia (H)        FAMILY HISTORY:  No family history on file.    SOCIAL HISTORY:  Social History     Social History     Marital status: Single     Spouse name: N/A     Number of children: N/A     Years of education: N/A     Social History Main Topics     Smoking status: Never Smoker     Smokeless tobacco: Never Used     Alcohol use Not on file     Drug use: Not on file     Sexual activity: Not on file     Other Topics  Concern     Not on file     Social History Narrative       CURRENT MEDICATIONS:  Outpatient Medications Prior to Visit   Medication Sig Dispense Refill     amiodarone (PACERONE/CODARONE) 200 MG tablet Take 1 tablet (200 mg) by mouth daily 60 tablet 11     clopidogrel (PLAVIX) 75 MG tablet Take 75 mg by mouth daily       digoxin (LANOXIN) 125 MCG tablet Take 1 tablet (125 mcg) by mouth daily 30 tablet 11     famotidine (PEPCID) 20 MG tablet Take 20 mg by mouth 2 times daily       furosemide (LASIX) 20 MG tablet Take 0.5 tablets (10 mg) by mouth 2 times daily 30 tablet 0     hydrALAZINE (APRESOLINE) 50 MG tablet Take 1 tablet (50 mg) by mouth 3 times daily 120 tablet 11     insulin aspart (NOVOLOG PEN) 100 UNIT/ML injection Less than 120 mg/dL = 0 Units  120-149 mg/dL = 1 Unit  150-199 mg/dL = 2 Units  200-249 mg/dL = 3 Units  250-299 mg/dL = 5 Units  300-349 mg/dL = 7 Units  350 mg/dL or greater = 8 Units       insulin glargine (LANTUS SOLOSTAR) 100 UNIT/ML pen Inject 20 Units Subcutaneous every evening 3 mL 11     isosorbide mononitrate (IMDUR) 30 MG 24 hr tablet Take 1 tablet (30 mg) by mouth daily 30 tablet 11     metFORMIN (GLUCOPHAGE) 500 MG tablet Take 500 mg by mouth 2 times daily (with meals)       metoprolol succinate (TOPROL-XL) 25 MG 24 hr tablet Take 0.5 tablets (12.5 mg) by mouth 2 times daily 30 tablet 11     nitroGLYcerin (NITROSTAT) 0.4 MG sublingual tablet Place 0.4 mg under the tongue every 5 minutes as needed for chest pain For chest pain place 1 tablet under the tongue every 5 minutes for 3 doses. If symptoms persist 5 minutes after 1st dose call 911.       rosuvastatin (CRESTOR) 20 MG tablet Take 1 tablet (20 mg) by mouth At Bedtime 30 tablet 11     warfarin (COUMADIN) 2 MG tablet Take 4 mg by mouth daily On 7/1 and 7/2, then recheck INR at [facility] and take as directed       aspirin 81 MG tablet Take 81 mg by mouth daily       No facility-administered medications prior  "to visit.        ROS:   CONSTITUTIONAL: Denies fever, chills, fatigue, or weight fluctuations.   HEENT: Denies headache and changes in speech. He complains of vision changes.   CV: Refer to HPI.   PULMONARY:Denies shortness of breath, cough, or previous TB exposure.   GI:Denies nausea, vomiting, diarrhea, and abdominal pain. Bowel movements are regular.   :Denies urinary alterations, dysuria, urinary frequency, hematuria, and abnormal drainage.   EXT:Denies lower extremity edema.   SKIN:Denies abnormal rashes or lesions.   MUSCULOSKELETAL:Denies upper or lower extremity weakness and pain.   NEUROLOGIC:Denies lightheadedness, dizziness, seizures, or upper or lower extremity paresthesia.     EXAM:  /76 (BP Location: Right arm, Patient Position: Chair, Cuff Size: Adult Regular)  Pulse 63  Ht 1.626 m (5' 4\")  Wt 77.2 kg (170 lb 3.2 oz)  SpO2 96%  BMI 29.21 kg/m2  GENERAL: Appears alert and oriented times three.   HEENT: Eye symmetrical and free of discharge bilaterally. Mucous membranes moist and without lesions.  NECK: Supple and without lymphadenopathy. JVD 8-10 cm.  CV: RRR, S1S2 present without murmur, rub, or gallop.   RESPIRATORY: Respirations regular, even, and unlabored. Lungs CTA throughout.   GI: Soft and non distended with normoactive bowel sounds present in all quadrants. No tenderness, rebound, guarding. No organomegaly.    EXTREMITIES: No peripheral edema. 2+ bilateral pedal pulses.   NEUROLOGIC: Alert and orientated x 3. CN II-XII grossly intact. No focal deficits.   MUSCULOSKELETAL: No joint swelling or tenderness.   SKIN: No jaundice. No rashes or lesions.     Labs:  CBC RESULTS:  Lab Results   Component Value Date    WBC 6.1 07/24/2018    RBC 3.45 (L) 07/24/2018    HGB 9.9 (L) 07/24/2018    HCT 32.6 (L) 07/24/2018    MCV 95 07/24/2018    MCH 28.7 07/24/2018    MCHC 30.4 (L) 07/24/2018    RDW 14.9 07/24/2018     07/24/2018       CMP RESULTS:  Lab Results   Component Value Date    NA " 139 2018    POTASSIUM 4.0 2018    CHLORIDE 106 2018    CO2 24 2018    ANIONGAP 10 2018     (H) 2018    BUN 31 (H) 2018    CR 1.61 (H) 2018    GFRESTIMATED 45 (L) 2018    GFRESTBLACK 54 (L) 2018    ARLENE 8.7 2018    BILITOTAL 1.0 2018    ALBUMIN 2.6 (L) 2018    ALKPHOS 121 2018    ALT 88 (H) 2018    AST 39 2018        INR RESULTS:  Lab Results   Component Value Date    INR 1.46 (H) 2018       Lab Results   Component Value Date    MAG 2.1 2018     Lab Results   Component Value Date    NTBNPI 5062 (H) 2018     Lab Results   Component Value Date    NTBNP 3355 (H) 2018       Diagnostics:  Recent Results (from the past 4320 hour(s))   ECHO COMPLETE WITH OPTISON    Narrative    [id number]  [id number]  [id number]  [id number]^STEFANIA^MANNY^V           [facility]  Echocardiography Laboratory  [address] [place], [place] [address]     Name: ADILSON RINCON  MRN: [id number]  : 1960  Study Date: 2018 11:27 AM  Age: 57 yrs  Gender: Male  Patient Location: [facility] [place], [place]  Reason For Study: Cardiac Arrest  Ordering Physician: MANNY AGUIAR  Referring Physician: SELF, REFERRED  Performed By: Laya Read RDCS     BSA: 1.8 m2  Height: 64 in  Weight: 170 lb  HR: 78  BP: 99/76 mmHg  _____________________________________________________________________________  __        Procedure  Echocardiogram with two-dimensional, color and spectral Doppler performed.  Contrast Optison. Optison (NDC #6872-5508-92) given intravenously. Patient was  given 5 ml mixture of 3 ml Optison and 6 ml saline. 4 ml wasted.  _____________________________________________________________________________  __        Interpretation Summary  Left ventricular wall thickness is normal. Borderline left ventricular  dilation is present. LVIDd is 5.19cm The Ejection Fraction is estimated at 20-  25%.  The mid to distal anteroseptum, mid to distal anterior wall, mid to  distal anterolateral wall and mid to apical septum and apex are akinetic. This  is consistent with large LAD territory infarct. Laminar thrombus is seen in  the LV apex  The right ventricle is normal size. Difficult to evaluate RV function but it  appears to be mild to moderately reduced.  Moderate tricuspid insufficiency is present. Right ventricular systolic  pressure is 26mmHg above the right atrial pressure.  Dilation of the inferior vena cava is present with normal respiratory  variation in diameter. Estimated mean right atrial pressure is 8 mmHg (mildly  elevated).  No pericardial effusion is present.     Previous study not available for comparison.  _____________________________________________________________________________  __        Left Ventricle  Left ventricular wall thickness is normal. Borderline left ventricular  dilation is present. LVIDd is 5.19cm. Grade III or advanced diastolic  dysfunction. The Ejection Fraction is estimated at 20-25%. The mid to distal  anteroseptum, mid to distal anterior  wall, mid to distal anterolateral wall and mid to apical septum and apex are  akinetic. This is consistent with large LAD territory infarct. Laminar  thrombus is seen in the LV apex.     Right Ventricle  Right ventricular wall thickness is normal. The right ventricle is normal  size. Difficult to evaluate RV function but it appears to be mild to  moderately reduced. A pacemaker lead is noted in the right ventricle.     Atria  Moderate left atrial enlargement is present. Moderate right atrial enlargement  is present. A pacemaker lead is noted in the right atrium.     Mitral Valve  The mitral valve is normal. Trace mitral insufficiency is present.        Aortic Valve  Aortic valve is normal in structure and function. The aortic valve is  tricuspid.     Tricuspid Valve  The tricuspid valve is normal. Moderate tricuspid insufficiency is  present.  The right ventricular systolic pressure is approximated at 26.0 mmHg plus the  right atrial pressure. Right ventricular systolic pressure is 26mmHg above the  right atrial pressure.     Pulmonic Valve  The pulmonic valve is normal. Trace pulmonic insufficiency is present.     Vessels  The aorta root is normal. Dilation of the inferior vena cava is present with  normal respiratory variation in diameter. Estimated mean right atrial pressure  is 8 mmHg (mildly elevated).     Pericardium  No pericardial effusion is present.        Compared to Previous Study  Previous study not available for comparison.  _____________________________________________________________________________  __  MMode/2D Measurements & Calculations  IVSd: 0.74 cm     LVIDd: 5.2 cm  LVIDs: 4.0 cm  LVPWd: 1.0 cm  FS: 23.3 %  LV mass(C)d: 167.6 grams  LV mass(C)dI: 91.8 grams/m2  asc Aorta Diam: 3.6 cm  LVOT diam: 2.1 cm  LVOT area: 3.5 cm2  LA Volume (BP): 81.1 ml  LA Volume Index (BP): 44.3 ml/m2  RWT: 0.40           Doppler Measurements & Calculations  MV E max sasha: 101.0 cm/sec  MV A max sasha: 16.5 cm/sec  MV E/A: 6.1  MV dec slope: 660.0 cm/sec2  PA V2 max: 96.5 cm/sec  PA max PG: 3.7 mmHg  PA acc time: 0.11 sec  TR max sasha: 255.0 cm/sec  TR max P.0 mmHg  E/E' av.8  Lateral E/e': 11.0  Medial E/e': 34.5     _____________________________________________________________________________  __           Report approved by: LEÓN Powell 2018 02:05 PM        6 MWT:  1260 feet     Cardiopulmonary Stress test:  Walked 7 min 22 seconds.  RER 1.1, VE/VCO2 slope: 48.24. /52 at rest, 105/59 exercise.     Assessment and Plan:   Mr. 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 He presents to clinic for routine follow up and has evidence of low output.      Chronic systolic heart failure secondary to ICM.    Stage C, NYHA Class III  ACEi/ARB Hydralazine 50 mg po TID. Defer increase given soft BP with CR.    BB Toprol XL 12.5 mg po BID. Defer increase given soft BP with CR.   Aldosterone antagonist contraindicated due to renal dysfunction  SCD prophylaxis ICD.  Fluid status Euvolemic. Continue Lasix 10 mg po BID.   Sleep Apnea Evaluation: Recommend referral for sleep study when feeling well.   - Note persistent nausea with rare episode of emesis and vision changes consistent with low output that are concerning. He notes decline in strength, appetite, and over all conditioning. However, note his Cr has improved consistent with adequate perfusion. Case reviewed with Dr. Ortiz. He would like to continue medical therapy at this time. Recommended he continue to monitor symptoms closely and if further decompensation he should contact us with plan to move forward with LVAD.     HTN.   - Soft BP on medications and down to 80's with rehab, but asymptomatic. Defer increase today.     CAD. s/p STEMI with ALYSSA to LAD 6/27/18  - Continue Crestor, Plavix, and Toprol XL.     PAF. CHADSVASC-4.   - Coumadin per AC.   - Continue Toprol XL.     VT/VF. With cardiac arrest.   - Device interrogation today consistent with no evidence of arrhythmias since prior shocks7/16/18.      LV thrombus.   - Coumadin as above.     CKD Stage III.   - Cr improved at 1.61 (1.78).     DM Type II. Hgb A1C-11.9.  - Management per PCP.     Follow up with Dr. Ortiz as scheduled 9/12/18.      Antonia Byrne  8/29/2018          CC  ALETHEA VOGT

## 2018-08-29 NOTE — LETTER
8/29/2018      RE: Mariusz Sharp  [address] [facility] [address]       Dear Colleague,    Thank you for the opportunity to participate in the care of your patient, Mariusz Sharp, at the [facility] HEART [facility]. Please see a copy of my visit note below.    HPI:   Mr. 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 He presents to clinic for routine follow up.     We recently received notification from Cardiac Rehab due concerns of hypotension in setting of exercise with nausea and decreased appetite. He notes nausea bi-weekly. He complains of mild substernal chest pain lasting 1-3 seconds when he was at rest, which resolved on its own. He is unsure of recurrence, but crispin any pain today. He denies palpitations, SOB, diaphoresis, or nausea with the pain. He notes occasional lightheadedness, but does not limit his activity. He also complains of white out vision changes twice in the last week after falling asleep in the car. He is able to walk about 5-7 minutes prior to needing rest. He denies fever, chills, chest pain, palpitations, SOB, RADER, PND, orthopnea, vomiting, diarrhea, hematochezia, melena, or LE edema. His weight remains stable at 164-166 lbs. He continues to follow a low sodium diet. His home BP log notes SBP in the 90's.        PAST MEDICAL HISTORY:  Past Medical History:   Diagnosis Date     Acute kidney injury (H)      CKD (chronic kidney disease)      Coronary artery disease      Diabetes mellitus (H)      HTN (hypertension)      Hyperlipidemia      Ischemic cardiomyopathy      Paroxysmal atrial flutter (H)      Systolic heart failure (H)      Ventricular tachycardia (H)        FAMILY HISTORY:  No family history on file.    SOCIAL HISTORY:  Social History     Social History      Marital status: Single     Spouse name: N/A     Number of children: N/A     Years of education: N/A     Social History Main Topics     Smoking status: Never Smoker     Smokeless tobacco: Never Used     Alcohol use Not on file     Drug use: Not on file     Sexual activity: Not on file     Other Topics Concern     Not on file     Social History Narrative       CURRENT MEDICATIONS:  Outpatient Medications Prior to Visit   Medication Sig Dispense Refill     amiodarone (PACERONE/CODARONE) 200 MG tablet Take 1 tablet (200 mg) by mouth daily 60 tablet 11     clopidogrel (PLAVIX) 75 MG tablet Take 75 mg by mouth daily       digoxin (LANOXIN) 125 MCG tablet Take 1 tablet (125 mcg) by mouth daily 30 tablet 11     famotidine (PEPCID) 20 MG tablet Take 20 mg by mouth 2 times daily       furosemide (LASIX) 20 MG tablet Take 0.5 tablets (10 mg) by mouth 2 times daily 30 tablet 0     hydrALAZINE (APRESOLINE) 50 MG tablet Take 1 tablet (50 mg) by mouth 3 times daily 120 tablet 11     insulin aspart (NOVOLOG PEN) 100 UNIT/ML injection Less than 120 mg/dL = 0 Units  120-149 mg/dL = 1 Unit  150-199 mg/dL = 2 Units  200-249 mg/dL = 3 Units  250-299 mg/dL = 5 Units  300-349 mg/dL = 7 Units  350 mg/dL or greater = 8 Units       insulin glargine (LANTUS SOLOSTAR) 100 UNIT/ML pen Inject 20 Units Subcutaneous every evening 3 mL 11     isosorbide mononitrate (IMDUR) 30 MG 24 hr tablet Take 1 tablet (30 mg) by mouth daily 30 tablet 11     metFORMIN (GLUCOPHAGE) 500 MG tablet Take 500 mg by mouth 2 times daily (with meals)       metoprolol succinate (TOPROL-XL) 25 MG 24 hr tablet Take 0.5 tablets (12.5 mg) by mouth 2 times daily 30 tablet 11     nitroGLYcerin (NITROSTAT) 0.4 MG sublingual tablet Place 0.4 mg under the tongue every 5 minutes as needed for chest pain For chest pain place 1 tablet under the tongue every 5 minutes for 3 doses. If symptoms persist 5 minutes after 1st dose call 911.       rosuvastatin (CRESTOR) 20 MG tablet Take 1  "tablet (20 mg) by mouth At Bedtime 30 tablet 11     warfarin (COUMADIN) 2 MG tablet Take 4 mg by mouth daily On 7/1 and 7/2, then recheck INR at [facility] and take as directed       aspirin 81 MG tablet Take 81 mg by mouth daily       No facility-administered medications prior to visit.        ROS:   CONSTITUTIONAL: Denies fever, chills, fatigue, or weight fluctuations.   HEENT: Denies headache and changes in speech. He complains of vision changes.   CV: Refer to HPI.   PULMONARY:Denies shortness of breath, cough, or previous TB exposure.   GI:Denies nausea, vomiting, diarrhea, and abdominal pain. Bowel movements are regular.   :Denies urinary alterations, dysuria, urinary frequency, hematuria, and abnormal drainage.   EXT:Denies lower extremity edema.   SKIN:Denies abnormal rashes or lesions.   MUSCULOSKELETAL:Denies upper or lower extremity weakness and pain.   NEUROLOGIC:Denies lightheadedness, dizziness, seizures, or upper or lower extremity paresthesia.     EXAM:  /76 (BP Location: Right arm, Patient Position: Chair, Cuff Size: Adult Regular)  Pulse 63  Ht 1.626 m (5' 4\")  Wt 77.2 kg (170 lb 3.2 oz)  SpO2 96%  BMI 29.21 kg/m2  GENERAL: Appears alert and oriented times three.   HEENT: Eye symmetrical and free of discharge bilaterally. Mucous membranes moist and without lesions.  NECK: Supple and without lymphadenopathy. JVD 8-10 cm.  CV: RRR, S1S2 present without murmur, rub, or gallop.   RESPIRATORY: Respirations regular, even, and unlabored. Lungs CTA throughout.   GI: Soft and non distended with normoactive bowel sounds present in all quadrants. No tenderness, rebound, guarding. No organomegaly.    EXTREMITIES: No peripheral edema. 2+ bilateral pedal pulses.   NEUROLOGIC: Alert and orientated x 3. CN II-XII grossly intact. No focal deficits.   MUSCULOSKELETAL: No joint swelling or tenderness.   SKIN: No jaundice. No rashes or lesions.     Labs:  CBC RESULTS:  Lab Results "   Component Value Date    WBC 6.1 2018    RBC 3.45 (L) 2018    HGB 9.9 (L) 2018    HCT 32.6 (L) 2018    MCV 95 2018    MCH 28.7 2018    MCHC 30.4 (L) 2018    RDW 14.9 2018     2018       CMP RESULTS:  Lab Results   Component Value Date     2018    POTASSIUM 4.0 2018    CHLORIDE 106 2018    CO2 24 2018    ANIONGAP 10 2018     (H) 2018    BUN 31 (H) 2018    CR 1.61 (H) 2018    GFRESTIMATED 45 (L) 2018    GFRESTBLACK 54 (L) 2018    ARLENE 8.7 2018    BILITOTAL 1.0 2018    ALBUMIN 2.6 (L) 2018    ALKPHOS 121 2018    ALT 88 (H) 2018    AST 39 2018        INR RESULTS:  Lab Results   Component Value Date    INR 1.46 (H) 2018       Lab Results   Component Value Date    MAG 2.1 2018     Lab Results   Component Value Date    NTBNPI 5062 (H) 2018     Lab Results   Component Value Date    NTBNP 3355 (H) 2018       Diagnostics:  Recent Results (from the past 4320 hour(s))   ECHO COMPLETE WITH OPTISON    Narrative    [id number]  [id number]  [id number]  [id number]^STEFANIA^MANNY^V           [facility]  Echocardiography Laboratory  [address] [place] [address]     Name: ADILSON RINCON  MRN: [id number]  : 1960  Study Date: 2018 11:27 AM  Age: 57 yrs  Gender: Male  Patient Location: [facility]  Reason For Study: Cardiac Arrest  Ordering Physician: MANNY AGUIAR  Referring Physician: SELF, REFERRED  Performed By: Laya Read RDCS     BSA: 1.8 m2  Height: 64 in  Weight: 170 lb  HR: 78  BP: 99/76 mmHg  _____________________________________________________________________________  __        Procedure  Echocardiogram with two-dimensional, color and spectral Doppler performed.  Contrast Optison. Optison (NDC #6301-5458-62) given intravenously. Patient was  given 5 ml mixture of 3 ml  Optison and 6 ml saline. 4 ml wasted.  _____________________________________________________________________________  __        Interpretation Summary  Left ventricular wall thickness is normal. Borderline left ventricular  dilation is present. LVIDd is 5.19cm The Ejection Fraction is estimated at 20-  25%. The mid to distal anteroseptum, mid to distal anterior wall, mid to  distal anterolateral wall and mid to apical septum and apex are akinetic. This  is consistent with large LAD territory infarct. Laminar thrombus is seen in  the LV apex  The right ventricle is normal size. Difficult to evaluate RV function but it  appears to be mild to moderately reduced.  Moderate tricuspid insufficiency is present. Right ventricular systolic  pressure is 26mmHg above the right atrial pressure.  Dilation of the inferior vena cava is present with normal respiratory  variation in diameter. Estimated mean right atrial pressure is 8 mmHg (mildly  elevated).  No pericardial effusion is present.     Previous study not available for comparison.  _____________________________________________________________________________  __        Left Ventricle  Left ventricular wall thickness is normal. Borderline left ventricular  dilation is present. LVIDd is 5.19cm. Grade III or advanced diastolic  dysfunction. The Ejection Fraction is estimated at 20-25%. The mid to distal  anteroseptum, mid to distal anterior  wall, mid to distal anterolateral wall and mid to apical septum and apex are  akinetic. This is consistent with large LAD territory infarct. Laminar  thrombus is seen in the LV apex.     Right Ventricle  Right ventricular wall thickness is normal. The right ventricle is normal  size. Difficult to evaluate RV function but it appears to be mild to  moderately reduced. A pacemaker lead is noted in the right ventricle.     Atria  Moderate left atrial enlargement is present. Moderate right atrial enlargement  is present. A pacemaker lead is  noted in the right atrium.     Mitral Valve  The mitral valve is normal. Trace mitral insufficiency is present.        Aortic Valve  Aortic valve is normal in structure and function. The aortic valve is  tricuspid.     Tricuspid Valve  The tricuspid valve is normal. Moderate tricuspid insufficiency is present.  The right ventricular systolic pressure is approximated at 26.0 mmHg plus the  right atrial pressure. Right ventricular systolic pressure is 26mmHg above the  right atrial pressure.     Pulmonic Valve  The pulmonic valve is normal. Trace pulmonic insufficiency is present.     Vessels  The aorta root is normal. Dilation of the inferior vena cava is present with  normal respiratory variation in diameter. Estimated mean right atrial pressure  is 8 mmHg (mildly elevated).     Pericardium  No pericardial effusion is present.        Compared to Previous Study  Previous study not available for comparison.  _____________________________________________________________________________  __  MMode/2D Measurements & Calculations  IVSd: 0.74 cm     LVIDd: 5.2 cm  LVIDs: 4.0 cm  LVPWd: 1.0 cm  FS: 23.3 %  LV mass(C)d: 167.6 grams  LV mass(C)dI: 91.8 grams/m2  asc Aorta Diam: 3.6 cm  LVOT diam: 2.1 cm  LVOT area: 3.5 cm2  LA Volume (BP): 81.1 ml  LA Volume Index (BP): 44.3 ml/m2  RWT: 0.40           Doppler Measurements & Calculations  MV E max sasha: 101.0 cm/sec  MV A max sasha: 16.5 cm/sec  MV E/A: 6.1  MV dec slope: 660.0 cm/sec2  PA V2 max: 96.5 cm/sec  PA max PG: 3.7 mmHg  PA acc time: 0.11 sec  TR max sasha: 255.0 cm/sec  TR max P.0 mmHg  E/E' av.8  Lateral E/e': 11.0  Medial E/e': 34.5     _____________________________________________________________________________  __           Report approved by: LEÓN Powell 2018 02:05 PM        6 MWT:  1260 feet     Cardiopulmonary Stress test:  Walked 7 min 22 seconds.  RER 1.1, VE/VCO2 slope: 48.24. /52 at rest, 105/59 exercise.     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 He presents to clinic for routine follow up and has evidence of low output.     Chronic systolic heart failure secondary to ICM.    Stage C, NYHA Class III  ACEi/ARB Hydralazine 50 mg po TID. Defer increase given soft BP with CR.    BB Toprol XL 12.5 mg po BID. Defer increase given soft BP with CR.   Aldosterone antagonist contraindicated due to renal dysfunction  SCD prophylaxis ICD.  Fluid status Euvolemic. Continue Lasix 10 mg po BID.   Sleep Apnea Evaluation: Recommend referral for sleep study when feeling well.   - Note persistent nausea with rare episode of emesis and vision changes consistent with low output that are concerning. He notes decline in strength, appetite, and over all conditioning. However, note his Cr has improved consistent with adequate perfusion. Case reviewed with Dr. Ortiz. He would like to continue medical therapy at this time. Recommended he continue to monitor symptoms closely and if further decompensation he should contact us with plan to move forward with LVAD.     HTN.   - Soft BP on medications and down to 80's with rehab, but asymptomatic. Defer increase today.     CAD. s/p STEMI with ALYSSA to LAD 6/27/18  - Continue Crestor, Plavix, and Toprol XL.     PAF. CHADSVASC-4.   - Coumadin per AC.   - Continue Toprol XL.     VT/VF. With cardiac arrest.   - Device interrogation today consistent with no evidence of arrhythmias since prior shocks7/16/18.      LV thrombus.   - Coumadin as above.     CKD Stage III.   - Cr improved at 1.61 (1.78).     DM Type II. Hgb A1C-11.9.  - Management per PCP.     Follow up with Dr. Ortiz as scheduled 9/12/18.      Antonia Byrne  8/29/2018      ALETHEA VORA

## 2018-08-29 NOTE — MR AVS SNAPSHOT
"              After Visit Summary   8/29/2018    Mariusz Sharp    MRN: [id number]           Patient Information     Date Of Birth          1960        Visit Information        Provider Department      8/29/2018 3:30 PM 1, Uc Cv Device [facility]        Today's Diagnoses     Heart failure (H)    -  1       Follow-ups after your visit        Your next 10 appointments already scheduled     Sep 12, 2018  2:30 PM CDT   Lab with [facility]   [facility] ([facility])    [address]  1st Floor  [facility] [address] [phone]            Sep 12, 2018  3:00 PM CDT   (Arrive by 2:45 PM)   NEW HEART FAILURE with Jenni Ortiz MD   [facility] ([facility])    [address]  [address] [place] [address] [phone]              Who to contact     If you have questions or need follow up information about today's clinic visit or your schedule please contact [facility] directly at [phone].  Normal or non-critical lab and imaging results will be communicated to you by [employer], letter or phone within 4 business days after the clinic has received the results. If you do not hear from us within 7 days, please contact the clinic through [employer] or phone. If you have a critical or abnormal lab result, we will notify you by phone as soon as possible.  Submit refill requests through [employer] or call your pharmacy and they will forward the refill request to us. Please allow 3 business days for your refill to be completed.          Additional Information About Your Visit        [employer] Information     [employer] lets you send messages to your doctor, view your test results, renew your prescriptions, schedule appointments and more. To sign up, go to [email or web address] . Click on \"Log in\" on the left side of the screen, which will take you to the Welcome page. Then click on \"Sign up Now\" on the right side of the page. "     You will be asked to enter the access code listed below, as well as some personal information. Please follow the directions to create your username and password.     Your access code is: [email or web address]  Expires: 10/15/2018  5:33 PM     Your access code will  in 90 days. If you need help or a new code, please call your [place] clinic or [phone].        Care EveryWhere ID     This is your Care EveryWhere ID. This could be used by other organizations to access your [place] medical records  [id number]         Blood Pressure from Last 3 Encounters:   18 108/76   18 109/75   18 96/70    Weight from Last 3 Encounters:   18 77.2 kg (170 lb 3.2 oz)   18 75.3 kg (166 lb 1.6 oz)   18 77 kg (169 lb 12.8 oz)              We Performed the Following     ICD DEVICE PROGRAMMING EVAL, SINGLE LEAD ICD          Today's Medication Changes          These changes are accurate as of 18 11:59 PM.  If you have any questions, ask your nurse or doctor.               Stop taking these medicines if you haven't already. Please contact your care team if you have questions.     aspirin 81 MG tablet   Stopped by:  Antonia Byrne APRN CNP                    Primary Care Provider Office Phone # Fax #    [facility] [phone]       [address] [place] [address]        Equal Access to Services     [facility] : Hadii can marcuso Manolo, waaxda luqadaha, qaybta kaalmada bessie, mynor gibbons. So [facility] [phone].    ATENCIÓN: Si habla español, tiene a waddell disposición servicios gratuitos de asistencia lingüística. Llame al [phone].    We comply with applicable federal civil rights laws and [place] laws. We do not discriminate on the basis of race, color, national origin, age, disability, sex, sexual orientation, or gender identity.            Thank you!     Thank you for choosing [facility]  for your care.  Our goal is always to provide you with excellent care. Hearing back from our patients is one way we can continue to improve our services. Please take a few minutes to complete the written survey that you may receive in the mail after your visit with us. Thank you!             Your Updated Medication List - Protect others around you: Learn how to safely use, store and throw away your medicines at www.disposemymeds.org.          This list is accurate as of 8/29/18 11:59 PM.  Always use your most recent med list.                   Brand Name Dispense Instructions for use Diagnosis    amiodarone 200 MG tablet    PACERONE/CODARONE    60 tablet    Take 1 tablet (200 mg) by mouth daily    Acute systolic heart failure (H)       clopidogrel 75 MG tablet    PLAVIX     Take 75 mg by mouth daily        digoxin 125 MCG tablet    LANOXIN    30 tablet    Take 1 tablet (125 mcg) by mouth daily    Acute systolic heart failure (H)       famotidine 20 MG tablet    PEPCID     Take 20 mg by mouth 2 times daily        furosemide 20 MG tablet    LASIX    30 tablet    Take 0.5 tablets (10 mg) by mouth 2 times daily    Chronic systolic heart failure (H)       hydrALAZINE 50 MG tablet    APRESOLINE    120 tablet    Take 1 tablet (50 mg) by mouth 3 times daily    Acute systolic heart failure (H)       insulin aspart 100 UNIT/ML injection    NovoLOG PEN     Less than 120 mg/dL = 0 Units 120-149 mg/dL = 1 Unit 150-199 mg/dL = 2 Units 200-249 mg/dL = 3 Units 250-299 mg/dL = 5 Units 300-349 mg/dL = 7 Units 350 mg/dL or greater = 8 Units        insulin glargine 100 UNIT/ML injection    LANTUS    3 mL    Inject 20 Units Subcutaneous every evening    Type 2 diabetes mellitus with hyperglycemia, with long-term current use of insulin (H)       isosorbide mononitrate 30 MG 24 hr tablet    IMDUR    30 tablet    Take 1 tablet (30 mg) by mouth daily    Acute systolic heart failure (H)       metFORMIN 500 MG tablet    GLUCOPHAGE     Take 500 mg by mouth 2  times daily (with meals)        metoprolol succinate 25 MG 24 hr tablet    TOPROL-XL    30 tablet    Take 0.5 tablets (12.5 mg) by mouth 2 times daily        NITROSTAT 0.4 MG sublingual tablet   Generic drug:  nitroGLYcerin      Place 0.4 mg under the tongue every 5 minutes as needed for chest pain For chest pain place 1 tablet under the tongue every 5 minutes for 3 doses. If symptoms persist 5 minutes after 1st dose call 911.        rosuvastatin 20 MG tablet    CRESTOR    30 tablet    Take 1 tablet (20 mg) by mouth At Bedtime    Acute systolic heart failure (H)       warfarin 2 MG tablet    COUMADIN     Take 4 mg by mouth daily On 7/1 and 7/2, then recheck INR at [facility] and take as directed

## 2018-09-06 LAB
DLCOCOR-%PRED-PRE: 87 %
DLCOCOR-PRE: 23.49 ML/MIN/MMHG
DLCOUNC-%PRED-PRE: 71 %
DLCOUNC-PRE: 19.34 ML/MIN/MMHG
DLCOUNC-PRED: 26.9 ML/MIN/MMHG
ERV-%PRED-PRE: 49 %
ERV-PRE: 0.39 L
ERV-PRED: 0.78 L
EXPTIME-PRE: 6.72 SEC
FEF2575-%PRED-PRE: 86 %
FEF2575-PRE: 2.42 L/SEC
FEF2575-PRED: 2.79 L/SEC
FEFMAX-%PRED-PRE: 85 %
FEFMAX-PRE: 7 L/SEC
FEFMAX-PRED: 8.22 L/SEC
FEV1-%PRED-PRE: 80 %
FEV1-PRE: 2.49 L
FEV1FEV6-PRE: 82 %
FEV1FEV6-PRED: 80 %
FEV1FVC-PRE: 81 %
FEV1FVC-PRED: 77 %
FEV1SVC-PRE: 81 %
FEV1SVC-PRED: 76 %
FIFMAX-PRE: 3.63 L/SEC
FRCPLETH-%PRED-PRE: 66 %
FRCPLETH-PRE: 2.13 L
FRCPLETH-PRED: 3.22 L
FVC-%PRED-PRE: 79 %
FVC-PRE: 3.06 L
FVC-PRED: 3.87 L
IC-%PRED-PRE: 82 %
IC-PRE: 2.69 L
IC-PRED: 3.26 L
RVPLETH-%PRED-PRE: 82 %
RVPLETH-PRE: 1.75 L
RVPLETH-PRED: 2.13 L
TLCPLETH-%PRED-PRE: 81 %
TLCPLETH-PRE: 4.82 L
TLCPLETH-PRED: 5.91 L
VA-%PRED-PRE: 80 %
VA-PRE: 4.56 L
VC-%PRED-PRE: 76 %
VC-PRE: 3.07 L
VC-PRED: 4.04 L

## 2018-09-11 DIAGNOSIS — I50.22 CHRONIC SYSTOLIC HEART FAILURE (H): ICD-10-CM

## 2018-09-11 LAB — FIO2-PRE: 21 %

## 2018-09-11 RX ORDER — FUROSEMIDE 20 MG
10 TABLET ORAL 2 TIMES DAILY
Qty: 30 TABLET | Refills: 0 | Status: SHIPPED | OUTPATIENT
Start: 2018-09-11 | End: 2018-10-16

## 2018-09-12 ENCOUNTER — OFFICE VISIT (OUTPATIENT)
Dept: CARDIOLOGY | Facility: CLINIC | Age: 58
End: 2018-09-12
Attending: INTERNAL MEDICINE
Payer: COMMERCIAL

## 2018-09-12 VITALS
HEART RATE: 69 BPM | OXYGEN SATURATION: 96 % | SYSTOLIC BLOOD PRESSURE: 113 MMHG | HEIGHT: 64 IN | WEIGHT: 169.5 LBS | DIASTOLIC BLOOD PRESSURE: 78 MMHG | BODY MASS INDEX: 28.94 KG/M2

## 2018-09-12 DIAGNOSIS — I50.22 CHRONIC SYSTOLIC HEART FAILURE (H): ICD-10-CM

## 2018-09-12 DIAGNOSIS — I23.6 LV (LEFT VENTRICULAR) MURAL THROMBUS FOLLOWING MI (H): ICD-10-CM

## 2018-09-12 DIAGNOSIS — I25.10 CORONARY ARTERY DISEASE INVOLVING NATIVE CORONARY ARTERY OF NATIVE HEART WITHOUT ANGINA PECTORIS: ICD-10-CM

## 2018-09-12 DIAGNOSIS — I50.20 SYSTOLIC HEART FAILURE, UNSPECIFIED HEART FAILURE CHRONICITY: Primary | ICD-10-CM

## 2018-09-12 LAB
ANION GAP SERPL CALCULATED.3IONS-SCNC: 9 MMOL/L (ref 3–14)
BUN SERPL-MCNC: 28 MG/DL (ref 7–30)
CALCIUM SERPL-MCNC: 8.7 MG/DL (ref 8.5–10.1)
CHLORIDE SERPL-SCNC: 105 MMOL/L (ref 94–109)
CO2 SERPL-SCNC: 24 MMOL/L (ref 20–32)
CREAT SERPL-MCNC: 1.76 MG/DL (ref 0.66–1.25)
GFR SERPL CREATININE-BSD FRML MDRD: 40 ML/MIN/1.7M2
GLUCOSE SERPL-MCNC: 146 MG/DL (ref 70–99)
NT-PROBNP SERPL-MCNC: 3948 PG/ML (ref 0–125)
POTASSIUM SERPL-SCNC: 3.7 MMOL/L (ref 3.4–5.3)
SODIUM SERPL-SCNC: 138 MMOL/L (ref 133–144)

## 2018-09-12 PROCEDURE — 36415 COLL VENOUS BLD VENIPUNCTURE: CPT | Performed by: NURSE PRACTITIONER

## 2018-09-12 PROCEDURE — 80048 BASIC METABOLIC PNL TOTAL CA: CPT | Performed by: NURSE PRACTITIONER

## 2018-09-12 PROCEDURE — G0463 HOSPITAL OUTPT CLINIC VISIT: HCPCS | Mod: ZF

## 2018-09-12 PROCEDURE — 83880 ASSAY OF NATRIURETIC PEPTIDE: CPT | Performed by: NURSE PRACTITIONER

## 2018-09-12 PROCEDURE — 99214 OFFICE O/P EST MOD 30 MIN: CPT | Mod: ZP | Performed by: INTERNAL MEDICINE

## 2018-09-12 ASSESSMENT — PAIN SCALES - GENERAL: PAINLEVEL: MILD PAIN (3)

## 2018-09-12 NOTE — NURSING NOTE
Cardiac Testing: Patient given instructions regarding Cardiopulmonary stress test. Discussed purpose, preparation, procedure and when to expect results reported back to the patient. Patient demonstrated understanding of this information and agreed to call with further questions or concerns.  Diet: Patient instructed regarding a heart healthy diet, including discussion of reduced fat and sodium intake. Patient demonstrated understanding of this information and agreed to call with further questions or concerns.  Labs: Patient was given results of the laboratory testing obtained today. Patient was instructed to return for the next laboratory testing in 1 week . Patient demonstrated understanding of this information and agreed to call with further questions or concerns.   Med Reconcile: Reviewed and verified all current medications with the patient. The updated medication list was printed and given to the patient.  Return Appointment: Patient given instructions regarding scheduling next clinic visit. Patient demonstrated understanding of this information and agreed to call with further questions or concerns.  Patient stated he understood all health information given and agreed to call with further questions or concerns.

## 2018-09-12 NOTE — PROGRESS NOTES
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  Pt relates that he was admitted to local hospital last week 2/2 kidney stone.  He reports that his Cr increase to more than 3 but improved after he passed the kidney stone.  He reports that his hydralazine was held at times while hospitalized 2/2 SBP < 100.  He also reports some mild volume overload after his received IVF but this improved when the IVF were stopped and his diuretics restarted.    Since discharge patient relates that he has been feeling well.  Feels as if he is returning back to his baseline.  He has resumed taking his hydralazine regularly.  He reports some mild episodes of \"fuzzy headedness\" but these are brief and self-limiting.  He denies any further episode of exertional N/V.  He denies fever, chills, chest pain, palpitations, SOB, change in RADER, PND, orthopnea, vomiting, diarrhea, hematochezia, melena, or LE edema.       PAST MEDICAL HISTORY:  Past Medical History:   Diagnosis Date     Acute kidney injury (H)      CKD (chronic kidney disease)      Coronary artery disease      Diabetes mellitus (H)      HTN (hypertension)      Hyperlipidemia      Ischemic cardiomyopathy      Paroxysmal atrial flutter (H)      Systolic heart failure (H)      Ventricular tachycardia (H)        FAMILY HISTORY:  No family history on file.    SOCIAL HISTORY:  Social History     Social History     Marital status: Single     Spouse name: N/A     Number of children: N/A     Years of education: N/A     Social History Main Topics     Smoking status: Never Smoker     Smokeless tobacco: Never Used     Alcohol use Not on file     Drug use: Not on file     Sexual activity: Not on file     Other Topics Concern     Not on file     Social History Narrative "       CURRENT MEDICATIONS:  Outpatient Medications Prior to Visit   Medication Sig Dispense Refill     amiodarone (PACERONE/CODARONE) 200 MG tablet Take 1 tablet (200 mg) by mouth daily 60 tablet 11     clopidogrel (PLAVIX) 75 MG tablet Take 75 mg by mouth daily       digoxin (LANOXIN) 125 MCG tablet Take 1 tablet (125 mcg) by mouth daily 30 tablet 11     famotidine (PEPCID) 20 MG tablet Take 20 mg by mouth 2 times daily       furosemide (LASIX) 20 MG tablet Take 0.5 tablets (10 mg) by mouth 2 times daily 30 tablet 0     hydrALAZINE (APRESOLINE) 50 MG tablet Take 1 tablet (50 mg) by mouth 3 times daily 120 tablet 11     insulin aspart (NOVOLOG PEN) 100 UNIT/ML injection Less than 120 mg/dL = 0 Units  120-149 mg/dL = 1 Unit  150-199 mg/dL = 2 Units  200-249 mg/dL = 3 Units  250-299 mg/dL = 5 Units  300-349 mg/dL = 7 Units  350 mg/dL or greater = 8 Units       insulin glargine (LANTUS SOLOSTAR) 100 UNIT/ML pen Inject 20 Units Subcutaneous every evening 3 mL 11     isosorbide mononitrate (IMDUR) 30 MG 24 hr tablet Take 1 tablet (30 mg) by mouth daily 30 tablet 11     metFORMIN (GLUCOPHAGE) 500 MG tablet Take 500 mg by mouth 2 times daily (with meals)       metoprolol succinate (TOPROL-XL) 25 MG 24 hr tablet Take 0.5 tablets (12.5 mg) by mouth 2 times daily 30 tablet 11     nitroGLYcerin (NITROSTAT) 0.4 MG sublingual tablet Place 0.4 mg under the tongue every 5 minutes as needed for chest pain For chest pain place 1 tablet under the tongue every 5 minutes for 3 doses. If symptoms persist 5 minutes after 1st dose call 911.       rosuvastatin (CRESTOR) 20 MG tablet Take 1 tablet (20 mg) by mouth At Bedtime 30 tablet 11     warfarin (COUMADIN) 2 MG tablet Take 4 mg by mouth daily On 7/1 and 7/2, then recheck INR at [facility] and take as directed       furosemide (LASIX) 20 MG tablet Take 0.5 tablets (10 mg) by mouth 2 times daily 30 tablet 0     metoprolol succinate (TOPROL-XL) 25 MG 24 hr tablet  "Take 0.5 tablets (12.5 mg) by mouth daily 30 tablet 11     No facility-administered medications prior to visit.        ROS:   CONSTITUTIONAL: Denies fever, chills, fatigue, or weight fluctuations.   HEENT: Denies headache and changes in speech. He complains of vision changes.   CV: Refer to HPI.   PULMONARY:Denies shortness of breath, cough, or previous TB exposure.   GI:Denies nausea, vomiting, diarrhea, and abdominal pain. Bowel movements are regular.   :See HPI  EXT:Denies lower extremity edema.   SKIN:Denies abnormal rashes or lesions.   MUSCULOSKELETAL:Denies upper or lower extremity weakness and pain.   NEUROLOGIC:Denies lightheadedness, dizziness, seizures, or upper or lower extremity paresthesia.     EXAM:  /78 (BP Location: Left arm, Patient Position: Chair, Cuff Size: Adult Regular)  Pulse 69  Ht 1.626 m (5' 4\")  Wt 76.9 kg (169 lb 8 oz)  SpO2 96%  BMI 29.09 kg/m2  GENERAL: Appears alert and oriented times three.   HEENT: Normocephalic, atraumatic.  Mucous membranes moist and without lesions.  NECK: Supple and without lymphadenopathy. JVD 6cm.  CV: RRR, S1S2 present without murmur, rub, or gallop.   RESPIRATORY: Respirations regular, even, and unlabored. Lungs CTA throughout.   GI: Soft and non distended with normoactive bowel sounds present in all quadrants. No tenderness, rebound, guarding. No organomegaly.    EXTREMITIES: No peripheral edema. 2+ bilateral pedal pulses.   NEUROLOGIC: Alert and orientated x 3. CN II-XII grossly intact. No focal deficits.   MUSCULOSKELETAL: No joint swelling or tenderness.   SKIN: No jaundice. No rashes or lesions.     Labs:  CBC RESULTS:  Lab Results   Component Value Date    WBC 6.1 07/24/2018    RBC 3.45 (L) 07/24/2018    HGB 9.9 (L) 07/24/2018    HCT 32.6 (L) 07/24/2018    MCV 95 07/24/2018    MCH 28.7 07/24/2018    MCHC 30.4 (L) 07/24/2018    RDW 14.9 07/24/2018     07/24/2018       CMP RESULTS:  Lab Results   Component Value Date     " 2018    POTASSIUM 3.7 2018    CHLORIDE 105 2018    CO2 24 2018    ANIONGAP 9 2018     (H) 2018    BUN 28 2018    CR 1.76 (H) 2018    GFRESTIMATED 40 (L) 2018    GFRESTBLACK 49 (L) 2018    ARLENE 8.7 2018    BILITOTAL 1.0 2018    ALBUMIN 2.6 (L) 2018    ALKPHOS 121 2018    ALT 88 (H) 2018    AST 39 2018        INR RESULTS:  Lab Results   Component Value Date    INR 1.46 (H) 2018       Lab Results   Component Value Date    MAG 2.1 2018     Lab Results   Component Value Date    NTBNPI 5062 (H) 2018     Lab Results   Component Value Date    NTBNP 3948 (H) 2018       Diagnostics:  Recent Results (from the past 4320 hour(s))   ECHO COMPLETE WITH OPTISON    Narrative    [id number]  [id number]  [id number]  [id number]^STEFANIA^MANNY^V           [facility],[place]  Echocardiography Laboratory  [address] [place], [place] [address]     Name: ADILSON RINCON  MRN: [id number]  : 1960  Study Date: 2018 11:27 AM  Age: 57 yrs  Gender: Male  [place] [facility] [place] Study: Cardiac Arrest  Ordering Physician: MANNY AGUIAR  Referring Physician: SELF, REFERRED  Performed By: Laya Read RDCS     BSA: 1.8 m2  Height: 64 in  Weight: 170 lb  HR: 78  BP: 99/76 mmHg  _____________________________________________________________________________  __        Procedure  Echocardiogram with two-dimensional, color and spectral Doppler performed.  Contrast Optison. Optison (NDC #1511-1135-82) given intravenously. Patient was  given 5 ml mixture of 3 ml Optison and 6 ml saline. 4 ml wasted.  _____________________________________________________________________________  __        Interpretation Summary  Left ventricular wall thickness is normal. Borderline left ventricular  dilation is present. LVIDd is 5.19cm The Ejection Fraction is estimated at 20-  25%. The mid  to distal anteroseptum, mid to distal anterior wall, mid to  distal anterolateral wall and mid to apical septum and apex are akinetic. This  is consistent with large LAD territory infarct. Laminar thrombus is seen in  the LV apex  The right ventricle is normal size. Difficult to evaluate RV function but it  appears to be mild to moderately reduced.  Moderate tricuspid insufficiency is present. Right ventricular systolic  pressure is 26mmHg above the right atrial pressure.  Dilation of the inferior vena cava is present with normal respiratory  variation in diameter. Estimated mean right atrial pressure is 8 mmHg (mildly  elevated).  No pericardial effusion is present.     Previous study not available for comparison.  _____________________________________________________________________________  __        Left Ventricle  Left ventricular wall thickness is normal. Borderline left ventricular  dilation is present. LVIDd is 5.19cm. Grade III or advanced diastolic  dysfunction. The Ejection Fraction is estimated at 20-25%. The mid to distal  anteroseptum, mid to distal anterior  wall, mid to distal anterolateral wall and mid to apical septum and apex are  akinetic. This is consistent with large LAD territory infarct. Laminar  thrombus is seen in the LV apex.     Right Ventricle  Right ventricular wall thickness is normal. The right ventricle is normal  size. Difficult to evaluate RV function but it appears to be mild to  moderately reduced. A pacemaker lead is noted in the right ventricle.     Atria  Moderate left atrial enlargement is present. Moderate right atrial enlargement  is present. A pacemaker lead is noted in the right atrium.     Mitral Valve  The mitral valve is normal. Trace mitral insufficiency is present.        Aortic Valve  Aortic valve is normal in structure and function. The aortic valve is  tricuspid.     Tricuspid Valve  The tricuspid valve is normal. Moderate tricuspid insufficiency is present.  The  right ventricular systolic pressure is approximated at 26.0 mmHg plus the  right atrial pressure. Right ventricular systolic pressure is 26mmHg above the  right atrial pressure.     Pulmonic Valve  The pulmonic valve is normal. Trace pulmonic insufficiency is present.     Vessels  The aorta root is normal. Dilation of the inferior vena cava is present with  normal respiratory variation in diameter. Estimated mean right atrial pressure  is 8 mmHg (mildly elevated).     Pericardium  No pericardial effusion is present.        Compared to Previous Study  Previous study not available for comparison.  _____________________________________________________________________________  __  MMode/2D Measurements & Calculations  IVSd: 0.74 cm     LVIDd: 5.2 cm  LVIDs: 4.0 cm  LVPWd: 1.0 cm  FS: 23.3 %  LV mass(C)d: 167.6 grams  LV mass(C)dI: 91.8 grams/m2  asc Aorta Diam: 3.6 cm  LVOT diam: 2.1 cm  LVOT area: 3.5 cm2  LA Volume (BP): 81.1 ml  LA Volume Index (BP): 44.3 ml/m2  RWT: 0.40           Doppler Measurements & Calculations  MV E max sasha: 101.0 cm/sec  MV A max sasha: 16.5 cm/sec  MV E/A: 6.1  MV dec slope: 660.0 cm/sec2  PA V2 max: 96.5 cm/sec  PA max PG: 3.7 mmHg  PA acc time: 0.11 sec  TR max sasha: 255.0 cm/sec  TR max P.0 mmHg  E/E' av.8  Lateral E/e': 11.0  Medial E/e': 34.5     _____________________________________________________________________________  __           Report approved by: LEÓN Powell 2018 02:05 PM                   Assessment and Plan: 57 year old male with a past medical history including CAD (s/p STEMI with ALYSSA to LAD 18), 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    Chronic systolic heart failure secondary to ICM.    Stage C, NYHA Class III  ACEi/ARB: no given CRI; Will continue Hydralazine 50 mg po TID. Defer increase occasional symptomatic episodes c/w  hypotension  BB Toprol XL 12.5 mg po BID.  Defer increase occasional symptomatic episodes c/w hypotension  Aldosterone antagonist contraindicated due to renal dysfunction  SCD prophylaxis ICD.  Fluid status Euvolemic. Continue Lasix 10 mg po BID.   Discussed with patient overall heart failure status and option of proceeding with LVAD placement (LVAD evaluation completed during last hospitalization).  Pt not ready to proceed with LVAD at present.  Will continue to monitor very closely.  Pt agreeable that if heart failure symptoms worsen or repeat CPX not improved/worsened would to ready to proceed at that time.      CAD. s/p STEMI with ALYSSA to LAD 6/27/18  - Continue Crestor, Plavix, and Toprol XL.     PAF. CHADSVASC-4.   - Coumadin per AC.   - Continue Toprol XL.     VT/VF. With cardiac arrest.   - No recent ICD shocks  - Last device interrogation with no evidence of arrhythmias since prior shocks7/16/18.      LV thrombus.   - Coumadin as above.     CKD Stage III.   - Cr worsened with recent kidney stone but improving close to baseline.  Continue to monitor.       Follow-up:  Labs in one week.  RTC to see CORE in 3 weeks.  RTC to see me in 6 weeks with repeat CPX at that visit.    Jenni Ortiz MD  Section Head - Advanced Heart Failure, Transplantation and Mechanical Circulatory Support  Co-Director - [facility]  Associate Professor of Medicine, [facility]

## 2018-09-12 NOTE — NURSING NOTE
Chief Complaint   Patient presents with     Follow Up For     HFrEF     Vitals were taken and medications were reconciled.     Evette Garcia RMA  2:44 PM

## 2018-09-12 NOTE — PATIENT INSTRUCTIONS
Cardiology Providers you saw during your visit:  Dr. Ortiz    Medication changes:  No medication changes.    Follow up:  1- Labs in 1 week.  2- CORE clinic in 3 weeks with labs prior  3- Please return to clinic for follow-up:  With Dr. Ortiz in 6 weeks with cardiopulmonary stress test prior      Please call if you have:  1. Weight gain of more than 2 pounds in a day or 5 pounds in a week  2. Increased shortness of breath, swelling or bloating  3. Dizziness, lightheadedness   4. Any questions or concerns.     Follow the American Heart Association Diet and Lifestyle recommendations:  Limit saturated fat, trans fat, sodium, red meat, sweets and sugar-sweetened beverages. If you choose to eat red meat, compare labels and select the leanest cuts available.  Aim for at least 150 minutes of moderate physical activity or 75 minutes of vigorous physical activity - or an equal combination of both - each week.    During business hours: [phone], press option # 1 to be routed to the [place] then option # 3 for medical questions to speak with a nurse     After hours, weekends or holidays: On Call Cardiologist- [phone]   option #4 and ask to speak to the on-call Cardiologist. Inform them you are a CORE/heart failure patient at the [facility] [facility].      Rosario Cooney, RN  Cardiology Nurse Care Coordinator    Keep up the good work!    Take Care!

## 2018-09-12 NOTE — LETTER
"9/12/2018      RE: Mariusz Sharp  [address] [facility] [address]       Dear Colleague,    Thank you for the opportunity to participate in the care of your patient, Mariusz Sharp, at the [facility] at [facility]. Please see a copy of my visit note below.    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  Pt relates that he was admitted to local hospital last week 2/2 kidney stone.  He reports that his Cr increase to more than 3 but improved after he passed the kidney stone.  He reports that his hydralazine was held at times while hospitalized 2/2 SBP < 100.  He also reports some mild volume overload after his received IVF but this improved when the IVF were stopped and his diuretics restarted.    Since discharge patient relates that he has been feeling well.  Feels as if he is returning back to his baseline.  He has resumed taking his hydralazine regularly.  He reports some mild episodes of \"fuzzy headedness\" but these are brief and self-limiting.  He denies any further episode of exertional N/V.  He denies fever, chills, chest pain, palpitations, SOB, change in RADER, PND, orthopnea, vomiting, diarrhea, hematochezia, melena, or LE edema.       PAST MEDICAL HISTORY:  Past Medical History:   Diagnosis Date     Acute kidney injury (H)      CKD (chronic kidney disease)      Coronary artery disease      Diabetes mellitus (H)      HTN (hypertension)      Hyperlipidemia      Ischemic cardiomyopathy      Paroxysmal atrial flutter (H)      Systolic heart failure (H)      Ventricular tachycardia (H)        FAMILY HISTORY:  No family history on file.    SOCIAL HISTORY:  Social History     Social History     Marital status: Single     Spouse name: N/A     Number of " children: N/A     Years of education: N/A     Social History Main Topics     Smoking status: Never Smoker     Smokeless tobacco: Never Used     Alcohol use Not on file     Drug use: Not on file     Sexual activity: Not on file     Other Topics Concern     Not on file     Social History Narrative       CURRENT MEDICATIONS:  Outpatient Medications Prior to Visit   Medication Sig Dispense Refill     amiodarone (PACERONE/CODARONE) 200 MG tablet Take 1 tablet (200 mg) by mouth daily 60 tablet 11     clopidogrel (PLAVIX) 75 MG tablet Take 75 mg by mouth daily       digoxin (LANOXIN) 125 MCG tablet Take 1 tablet (125 mcg) by mouth daily 30 tablet 11     famotidine (PEPCID) 20 MG tablet Take 20 mg by mouth 2 times daily       furosemide (LASIX) 20 MG tablet Take 0.5 tablets (10 mg) by mouth 2 times daily 30 tablet 0     hydrALAZINE (APRESOLINE) 50 MG tablet Take 1 tablet (50 mg) by mouth 3 times daily 120 tablet 11     insulin aspart (NOVOLOG PEN) 100 UNIT/ML injection Less than 120 mg/dL = 0 Units  120-149 mg/dL = 1 Unit  150-199 mg/dL = 2 Units  200-249 mg/dL = 3 Units  250-299 mg/dL = 5 Units  300-349 mg/dL = 7 Units  350 mg/dL or greater = 8 Units       insulin glargine (LANTUS SOLOSTAR) 100 UNIT/ML pen Inject 20 Units Subcutaneous every evening 3 mL 11     isosorbide mononitrate (IMDUR) 30 MG 24 hr tablet Take 1 tablet (30 mg) by mouth daily 30 tablet 11     metFORMIN (GLUCOPHAGE) 500 MG tablet Take 500 mg by mouth 2 times daily (with meals)       metoprolol succinate (TOPROL-XL) 25 MG 24 hr tablet Take 0.5 tablets (12.5 mg) by mouth 2 times daily 30 tablet 11     nitroGLYcerin (NITROSTAT) 0.4 MG sublingual tablet Place 0.4 mg under the tongue every 5 minutes as needed for chest pain For chest pain place 1 tablet under the tongue every 5 minutes for 3 doses. If symptoms persist 5 minutes after 1st dose call 911.       rosuvastatin (CRESTOR) 20 MG tablet Take 1 tablet (20 mg) by mouth At Bedtime 30 tablet 11      "warfarin (COUMADIN) 2 MG tablet Take 4 mg by mouth daily On 7/1 and 7/2, then recheck INR at [facility] and take as directed       furosemide (LASIX) 20 MG tablet Take 0.5 tablets (10 mg) by mouth 2 times daily 30 tablet 0     metoprolol succinate (TOPROL-XL) 25 MG 24 hr tablet Take 0.5 tablets (12.5 mg) by mouth daily 30 tablet 11     No facility-administered medications prior to visit.        ROS:   CONSTITUTIONAL: Denies fever, chills, fatigue, or weight fluctuations.   HEENT: Denies headache and changes in speech. He complains of vision changes.   CV: Refer to HPI.   PULMONARY:Denies shortness of breath, cough, or previous TB exposure.   GI:Denies nausea, vomiting, diarrhea, and abdominal pain. Bowel movements are regular.   :See HPI  EXT:Denies lower extremity edema.   SKIN:Denies abnormal rashes or lesions.   MUSCULOSKELETAL:Denies upper or lower extremity weakness and pain.   NEUROLOGIC:Denies lightheadedness, dizziness, seizures, or upper or lower extremity paresthesia.     EXAM:  /78 (BP Location: Left arm, Patient Position: Chair, Cuff Size: Adult Regular)  Pulse 69  Ht 1.626 m (5' 4\")  Wt 76.9 kg (169 lb 8 oz)  SpO2 96%  BMI 29.09 kg/m2  GENERAL: Appears alert and oriented times three.   HEENT: Normocephalic, atraumatic.  Mucous membranes moist and without lesions.  NECK: Supple and without lymphadenopathy. JVD 6cm.  CV: RRR, S1S2 present without murmur, rub, or gallop.   RESPIRATORY: Respirations regular, even, and unlabored. Lungs CTA throughout.   GI: Soft and non distended with normoactive bowel sounds present in all quadrants. No tenderness, rebound, guarding. No organomegaly.    EXTREMITIES: No peripheral edema. 2+ bilateral pedal pulses.   NEUROLOGIC: Alert and orientated x 3. CN II-XII grossly intact. No focal deficits.   MUSCULOSKELETAL: No joint swelling or tenderness.   SKIN: No jaundice. No rashes or lesions.     Labs:  CBC RESULTS:  Lab Results   Component " Value Date    WBC 6.1 2018    RBC 3.45 (L) 2018    HGB 9.9 (L) 2018    HCT 32.6 (L) 2018    MCV 95 2018    MCH 28.7 2018    MCHC 30.4 (L) 2018    RDW 14.9 2018     2018       CMP RESULTS:  Lab Results   Component Value Date     2018    POTASSIUM 3.7 2018    CHLORIDE 105 2018    CO2 24 2018    ANIONGAP 9 2018     (H) 2018    BUN 28 2018    CR 1.76 (H) 2018    GFRESTIMATED 40 (L) 2018    GFRESTBLACK 49 (L) 2018    ARLENE 8.7 2018    BILITOTAL 1.0 2018    ALBUMIN 2.6 (L) 2018    ALKPHOS 121 2018    ALT 88 (H) 2018    AST 39 2018        INR RESULTS:  Lab Results   Component Value Date    INR 1.46 (H) 2018       Lab Results   Component Value Date    MAG 2.1 2018     Lab Results   Component Value Date    NTBNPI 5062 (H) 2018     Lab Results   Component Value Date    NTBNP 3948 (H) 2018       Diagnostics:  Recent Results (from the past 4320 hour(s))   ECHO COMPLETE WITH OPTISON    Narrative    [id number]  [id number]  [id number]  [id number]^STEFANIA^MANNY^V           [facility]  Echocardiography Laboratory  [address] [place] [id number]     Name: ADILSON RINCON  MRN: [id number]  : 1960  Study Date: 2018 11:27 AM  Age: 57 yrs  Gender: Male  Patient Location: [facility]  Reason For Study: Cardiac Arrest  Ordering Physician: MANNY AGUIAR  Referring Physician: SELF, REFERRED  Performed By: Laya Read RDCS     BSA: 1.8 m2  Height: 64 in  Weight: 170 lb  HR: 78  BP: 99/76 mmHg  _____________________________________________________________________________  __        Procedure  Echocardiogram with two-dimensional, color and spectral Doppler performed.  Contrast Optison. Optison (NDC #7720-2095-04) given intravenously. Patient was  given 5 ml mixture of 3 ml Optison and 6 ml  saline. 4 ml wasted.  _____________________________________________________________________________  __        Interpretation Summary  Left ventricular wall thickness is normal. Borderline left ventricular  dilation is present. LVIDd is 5.19cm The Ejection Fraction is estimated at 20-  25%. The mid to distal anteroseptum, mid to distal anterior wall, mid to  distal anterolateral wall and mid to apical septum and apex are akinetic. This  is consistent with large LAD territory infarct. Laminar thrombus is seen in  the LV apex  The right ventricle is normal size. Difficult to evaluate RV function but it  appears to be mild to moderately reduced.  Moderate tricuspid insufficiency is present. Right ventricular systolic  pressure is 26mmHg above the right atrial pressure.  Dilation of the inferior vena cava is present with normal respiratory  variation in diameter. Estimated mean right atrial pressure is 8 mmHg (mildly  elevated).  No pericardial effusion is present.     Previous study not available for comparison.  _____________________________________________________________________________  __        Left Ventricle  Left ventricular wall thickness is normal. Borderline left ventricular  dilation is present. LVIDd is 5.19cm. Grade III or advanced diastolic  dysfunction. The Ejection Fraction is estimated at 20-25%. The mid to distal  anteroseptum, mid to distal anterior  wall, mid to distal anterolateral wall and mid to apical septum and apex are  akinetic. This is consistent with large LAD territory infarct. Laminar  thrombus is seen in the LV apex.     Right Ventricle  Right ventricular wall thickness is normal. The right ventricle is normal  size. Difficult to evaluate RV function but it appears to be mild to  moderately reduced. A pacemaker lead is noted in the right ventricle.     Atria  Moderate left atrial enlargement is present. Moderate right atrial enlargement  is present. A pacemaker lead is noted in the  right atrium.     Mitral Valve  The mitral valve is normal. Trace mitral insufficiency is present.        Aortic Valve  Aortic valve is normal in structure and function. The aortic valve is  tricuspid.     Tricuspid Valve  The tricuspid valve is normal. Moderate tricuspid insufficiency is present.  The right ventricular systolic pressure is approximated at 26.0 mmHg plus the  right atrial pressure. Right ventricular systolic pressure is 26mmHg above the  right atrial pressure.     Pulmonic Valve  The pulmonic valve is normal. Trace pulmonic insufficiency is present.     Vessels  The aorta root is normal. Dilation of the inferior vena cava is present with  normal respiratory variation in diameter. Estimated mean right atrial pressure  is 8 mmHg (mildly elevated).     Pericardium  No pericardial effusion is present.        Compared to Previous Study  Previous study not available for comparison.  _____________________________________________________________________________  __  MMode/2D Measurements & Calculations  IVSd: 0.74 cm     LVIDd: 5.2 cm  LVIDs: 4.0 cm  LVPWd: 1.0 cm  FS: 23.3 %  LV mass(C)d: 167.6 grams  LV mass(C)dI: 91.8 grams/m2  asc Aorta Diam: 3.6 cm  LVOT diam: 2.1 cm  LVOT area: 3.5 cm2  LA Volume (BP): 81.1 ml  LA Volume Index (BP): 44.3 ml/m2  RWT: 0.40           Doppler Measurements & Calculations  MV E max sasha: 101.0 cm/sec  MV A max sasha: 16.5 cm/sec  MV E/A: 6.1  MV dec slope: 660.0 cm/sec2  PA V2 max: 96.5 cm/sec  PA max PG: 3.7 mmHg  PA acc time: 0.11 sec  TR max sasha: 255.0 cm/sec  TR max P.0 mmHg  E/E' av.8  Lateral E/e': 11.0  Medial E/e': 34.5     _____________________________________________________________________________  __           Report approved by: LEÓN Powell 2018 02:05 PM                   Assessment and Plan: 57 year old male with a past medical history including CAD (s/p STEMI with ALYSSA to LAD 18), monomorphic VT s/p ICD shock times four 18, LV  thrombus, CKD Stage III, PAF, DM Type II, and ICM with EF 20-25% presents for ongoing evaluation and management.    Chronic systolic heart failure secondary to ICM.    Stage C, NYHA Class III  ACEi/ARB: no given CRI; Will continue Hydralazine 50 mg po TID. Defer increase occasional symptomatic episodes c/w hypotension  BB Toprol XL 12.5 mg po BID.  Defer increase occasional symptomatic episodes c/w hypotension  Aldosterone antagonist contraindicated due to renal dysfunction  SCD prophylaxis ICD.  Fluid status Euvolemic. Continue Lasix 10 mg po BID.   Discussed with patient overall heart failure status and option of proceeding with LVAD placement (LVAD evaluation completed during last hospitalization).  Pt not ready to proceed with LVAD at present.  Will continue to monitor very closely.  Pt agreeable that if heart failure symptoms worsen or repeat CPX not improved/worsened would to ready to proceed at that time.      CAD. s/p STEMI with ALYSSA to LAD 6/27/18  - Continue Crestor, Plavix, and Toprol XL.     PAF. CHADSVASC-4.   - Coumadin per AC.   - Continue Toprol XL.     VT/VF. With cardiac arrest.   - No recent ICD shocks  - Last device interrogation with no evidence of arrhythmias since prior shocks7/16/18.      LV thrombus.   - Coumadin as above.     CKD Stage III.   - Cr worsened with recent kidney stone but improving close to baseline.  Continue to monitor.       Follow-up:  Labs in one week.  RTC to see CORE in 3 weeks.  RTC to see me in 6 weeks with repeat CPX at that visit.    Jenni Ortiz MD  Section Head - Advanced Heart Failure, Transplantation and Mechanical Circulatory Support  Co-Director - Adult Congenital and Cardiovascular Genetics Center  Associate Professor of Medicine, [facility]

## 2018-09-12 NOTE — MR AVS SNAPSHOT
After Visit Summary   9/12/2018    Mariusz Sharp    MRN: [id number]           Patient Information     Date Of Birth          1960        Visit Information        Provider Department      9/12/2018 3:00 PM Jenni Ortiz MD [facility]        Today's Diagnoses     Systolic heart failure, unspecified heart failure chronicity (H)    -  1      Care Instructions    Cardiology Providers you saw during your visit:  Dr. Ortiz    Medication changes:  No medication changes.    Follow up:  1- Labs in 1 week.  2- CORE clinic in 3 weeks with labs prior  3- Please return to clinic for follow-up:  With Dr. Ortiz in 6 weeks with cardiopulmonary stress test prior      Please call if you have:  1. Weight gain of more than 2 pounds in a day or 5 pounds in a week  2. Increased shortness of breath, swelling or bloating  3. Dizziness, lightheadedness   4. Any questions or concerns.     Follow the American Heart Association Diet and Lifestyle recommendations:  Limit saturated fat, trans fat, sodium, red meat, sweets and sugar-sweetened beverages. If you choose to eat red meat, compare labels and select the leanest cuts available.  Aim for at least 150 minutes of moderate physical activity or 75 minutes of vigorous physical activity - or an equal combination of both - each week.    During business hours: [phone], press option # 1 to be routed to the [facility] then option # 3 for medical questions to speak with a nurse     After hours, weekends or holidays: On Call Cardiologist- [phone]   option #4 and ask to speak to the on-call Cardiologist. Inform them you are a CORE/heart failure patient at the [facility].      Rosario Cooney RN  Cardiology Nurse Care Coordinator    Keep up the good work!    Take Care!            Follow-ups after your visit        Additional Services     Follow-Up with CORE Clinic       With labs prior            Follow-Up with Advanced Heart Failure Clinic                  Your next 10 appointments already scheduled     Oct 03, 2018  2:00 PM CDT   Lab with [facility] LAB    Health Lab ([facility])    [address]  1st Floor  [facility] [id number] [phone]            Oct 03, 2018  2:30 PM CDT   (Arrive by 2:15 PM)   CORE RETURN with Meredith Garcia NP   [facility] ([facility])    [address]  [address]  [facility] [id number] [phone]            Oct 17, 2018  1:00 PM CDT   Card Cardpul Stress Tst Adult with [facility]   [facility] ([facility])    [address]  [facility] [address]               Oct 17, 2018  2:00 PM CDT   (Arrive by 1:45 PM)   RETURN HEART FAILURE with Jenni Ortiz MD   [facility] ([facility])    [address]  [address]  [facility] [id number] [phone]              Future tests that were ordered for you today     Open Future Orders        Priority Expected Expires Ordered    Basic metabolic panel Routine 9/19/2018 9/12/2019 9/12/2018    Card Cardiopulmonary stress test - adult Routine 10/24/2018 9/12/2019 9/12/2018    Basic metabolic panel Routine 10/24/2018 9/12/2019 9/12/2018    Follow-Up with Advanced Heart Failure Clinic Routine 10/24/2018 9/12/2019 9/12/2018    Follow-Up with CORE Clinic Routine 10/3/2018 9/12/2019 9/12/2018            Who to contact     If you have questions or need follow up information about today's clinic visit or your schedule please contact [facility] directly at [phone].  Normal or non-critical lab and imaging results will be communicated to you by MyChart, letter or phone within 4 business days after the clinic has received the results. If you do not hear from us within 7 days, please contact the clinic through MyChart or phone. If you have a critical or abnormal lab result, we will notify you by phone as soon as  "possible.  Submit refill requests through [employer]-[facility] or call your pharmacy and they will forward the refill request to us. Please allow 3 business days for your refill to be completed.          Additional Information About Your Visit        Care EveryWhere ID     This is your Care EveryWhere ID. This could be used by other organizations to access your [facility] medical records  [id number]        Your Vitals Were     Pulse Height Pulse Oximetry BMI (Body Mass Index)          69 1.626 m (5' 4\") 96% 29.09 kg/m2         Blood Pressure from Last 3 Encounters:   09/12/18 113/78   08/29/18 108/76   08/06/18 109/75    Weight from Last 3 Encounters:   09/12/18 76.9 kg (169 lb 8 oz)   08/29/18 77.2 kg (170 lb 3.2 oz)   08/06/18 75.3 kg (166 lb 1.6 oz)               Primary Care Provider Office Phone # Fax #    [facility] [phone]       [address] [place] [address]        Equal Access to Services     GILLIAN COX : Hadii can weaver hadasho Soomaali, waaxda luqadaha, qaybta kaalmada adeegyamelida, mynor bar . So [facility] [place] [phone].    ATENCIÓN: Si habla español, tiene a waddell disposición servicios gratuitos de asistencia lingüística. [facility] [phone].    We comply with applicable federal civil rights laws and [place] laws. We do not discriminate on the basis of race, color, national origin, age, disability, sex, sexual orientation, or gender identity.            Thank you!     Thank you for choosing [facility]  for your care. Our goal is always to provide you with excellent care. Hearing back from our patients is one way we can continue to improve our services. Please take a few minutes to complete the written survey that you may receive in the mail after your visit with us. Thank you!             Your Updated Medication List - Protect others around you: Learn how to safely use, store and throw away your medicines at www.disposemymeds.org.          This " list is accurate as of 9/12/18  3:55 PM.  Always use your most recent med list.                   Brand Name Dispense Instructions for use Diagnosis    amiodarone 200 MG tablet    PACERONE/CODARONE    60 tablet    Take 1 tablet (200 mg) by mouth daily    Acute systolic heart failure (H)       clopidogrel 75 MG tablet    PLAVIX     Take 75 mg by mouth daily        digoxin 125 MCG tablet    LANOXIN    30 tablet    Take 1 tablet (125 mcg) by mouth daily    Acute systolic heart failure (H)       famotidine 20 MG tablet    PEPCID     Take 20 mg by mouth 2 times daily        furosemide 20 MG tablet    LASIX    30 tablet    Take 0.5 tablets (10 mg) by mouth 2 times daily    Chronic systolic heart failure (H)       hydrALAZINE 50 MG tablet    APRESOLINE    120 tablet    Take 1 tablet (50 mg) by mouth 3 times daily    Acute systolic heart failure (H)       insulin aspart 100 UNIT/ML injection    NovoLOG PEN     Less than 120 mg/dL = 0 Units 120-149 mg/dL = 1 Unit 150-199 mg/dL = 2 Units 200-249 mg/dL = 3 Units 250-299 mg/dL = 5 Units 300-349 mg/dL = 7 Units 350 mg/dL or greater = 8 Units        insulin glargine 100 UNIT/ML injection    LANTUS    3 mL    Inject 20 Units Subcutaneous every evening    Type 2 diabetes mellitus with hyperglycemia, with long-term current use of insulin (H)       isosorbide mononitrate 30 MG 24 hr tablet    IMDUR    30 tablet    Take 1 tablet (30 mg) by mouth daily    Acute systolic heart failure (H)       metFORMIN 500 MG tablet    GLUCOPHAGE     Take 500 mg by mouth 2 times daily (with meals)        metoprolol succinate 25 MG 24 hr tablet    TOPROL-XL    30 tablet    Take 0.5 tablets (12.5 mg) by mouth 2 times daily        NITROSTAT 0.4 MG sublingual tablet   Generic drug:  nitroGLYcerin      Place 0.4 mg under the tongue every 5 minutes as needed for chest pain For chest pain place 1 tablet under the tongue every 5 minutes for 3 doses. If symptoms persist 5 minutes after 1st dose call 911.         rosuvastatin 20 MG tablet    CRESTOR    30 tablet    Take 1 tablet (20 mg) by mouth At Bedtime    Acute systolic heart failure (H)       warfarin 2 MG tablet    COUMADIN     Take 4 mg by mouth daily On 7/1 and 7/2, then recheck INR at [facility] and take as directed

## 2018-09-30 ENCOUNTER — PRE VISIT (OUTPATIENT)
Dept: CARDIOLOGY | Facility: CLINIC | Age: 58
End: 2018-09-30

## 2018-09-30 DIAGNOSIS — I50.22 CHRONIC SYSTOLIC HEART FAILURE (H): Primary | ICD-10-CM

## 2018-10-03 ENCOUNTER — OFFICE VISIT (OUTPATIENT)
Dept: CARDIOLOGY | Facility: CLINIC | Age: 58
End: 2018-10-03
Attending: NURSE PRACTITIONER
Payer: COMMERCIAL

## 2018-10-03 VITALS
HEART RATE: 64 BPM | HEIGHT: 64 IN | BODY MASS INDEX: 28.46 KG/M2 | WEIGHT: 166.7 LBS | DIASTOLIC BLOOD PRESSURE: 65 MMHG | OXYGEN SATURATION: 96 % | SYSTOLIC BLOOD PRESSURE: 100 MMHG

## 2018-10-03 DIAGNOSIS — I50.22 CHRONIC SYSTOLIC HEART FAILURE (H): ICD-10-CM

## 2018-10-03 LAB
ANION GAP SERPL CALCULATED.3IONS-SCNC: 10 MMOL/L (ref 3–14)
BUN SERPL-MCNC: 32 MG/DL (ref 7–30)
CALCIUM SERPL-MCNC: 8.7 MG/DL (ref 8.5–10.1)
CHLORIDE SERPL-SCNC: 105 MMOL/L (ref 94–109)
CO2 SERPL-SCNC: 22 MMOL/L (ref 20–32)
CREAT SERPL-MCNC: 1.54 MG/DL (ref 0.66–1.25)
GFR SERPL CREATININE-BSD FRML MDRD: 47 ML/MIN/1.7M2
GLUCOSE SERPL-MCNC: 243 MG/DL (ref 70–99)
POTASSIUM SERPL-SCNC: 4 MMOL/L (ref 3.4–5.3)
SODIUM SERPL-SCNC: 138 MMOL/L (ref 133–144)

## 2018-10-03 PROCEDURE — 80048 BASIC METABOLIC PNL TOTAL CA: CPT | Performed by: NURSE PRACTITIONER

## 2018-10-03 PROCEDURE — 99214 OFFICE O/P EST MOD 30 MIN: CPT | Mod: ZP | Performed by: NURSE PRACTITIONER

## 2018-10-03 PROCEDURE — G0463 HOSPITAL OUTPT CLINIC VISIT: HCPCS | Mod: ZF

## 2018-10-03 PROCEDURE — 36415 COLL VENOUS BLD VENIPUNCTURE: CPT | Performed by: NURSE PRACTITIONER

## 2018-10-03 ASSESSMENT — PAIN SCALES - GENERAL: PAINLEVEL: NO PAIN (0)

## 2018-10-03 NOTE — PROGRESS NOTES
HPI:   Mr. 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 He was last seen in clinic 3 weeks ago when no changes were made. He returns for follow up.    Mariusz reports his symptoms vary daily. On his worst days, he is limited by significant nausea and early satiety. He notes occasional fleeting chest pain. He denies antonieta shortness of breath but also notes he struggles to climb the slight incline of his driveway. No orthopnea, PND, palpitations, or edema.     PAST MEDICAL HISTORY:  Past Medical History:   Diagnosis Date     Acute kidney injury (H)      CKD (chronic kidney disease)      Coronary artery disease      Diabetes mellitus (H)      HTN (hypertension)      Hyperlipidemia      Ischemic cardiomyopathy      Paroxysmal atrial flutter (H)      Systolic heart failure (H)      Ventricular tachycardia (H)        FAMILY HISTORY:  No family history on file.    SOCIAL HISTORY:  Social History     Social History     Marital status: Single     Spouse name: N/A     Number of children: N/A     Years of education: N/A     Social History Main Topics     Smoking status: Never Smoker     Smokeless tobacco: Never Used     Alcohol use Not on file     Drug use: Not on file     Sexual activity: Not on file     Other Topics Concern     Not on file     Social History Narrative       CURRENT MEDICATIONS:  Outpatient Medications Prior to Visit   Medication Sig Dispense Refill     amiodarone (PACERONE/CODARONE) 200 MG tablet Take 1 tablet (200 mg) by mouth daily 60 tablet 11     clopidogrel (PLAVIX) 75 MG tablet Take 75 mg by mouth daily       digoxin (LANOXIN) 125 MCG tablet Take 1 tablet (125 mcg) by mouth daily 30 tablet 11     famotidine (PEPCID) 20 MG tablet Take 20 mg by mouth 2 times daily       furosemide (LASIX) 20 MG tablet Take 0.5 tablets (10 mg) by  mouth 2 times daily 30 tablet 0     hydrALAZINE (APRESOLINE) 50 MG tablet Take 1 tablet (50 mg) by mouth 3 times daily 120 tablet 11     insulin aspart (NOVOLOG PEN) 100 UNIT/ML injection Less than 120 mg/dL = 0 Units  120-149 mg/dL = 1 Unit  150-199 mg/dL = 2 Units  200-249 mg/dL = 3 Units  250-299 mg/dL = 5 Units  300-349 mg/dL = 7 Units  350 mg/dL or greater = 8 Units       insulin glargine (LANTUS SOLOSTAR) 100 UNIT/ML pen Inject 20 Units Subcutaneous every evening 3 mL 11     isosorbide mononitrate (IMDUR) 30 MG 24 hr tablet Take 1 tablet (30 mg) by mouth daily 30 tablet 11     metFORMIN (GLUCOPHAGE) 500 MG tablet Take 500 mg by mouth 2 times daily (with meals)       metoprolol succinate (TOPROL-XL) 25 MG 24 hr tablet Take 0.5 tablets (12.5 mg) by mouth 2 times daily 30 tablet 11     nitroGLYcerin (NITROSTAT) 0.4 MG sublingual tablet Place 0.4 mg under the tongue every 5 minutes as needed for chest pain For chest pain place 1 tablet under the tongue every 5 minutes for 3 doses. If symptoms persist 5 minutes after 1st dose call 911.       rosuvastatin (CRESTOR) 20 MG tablet Take 1 tablet (20 mg) by mouth At Bedtime 30 tablet 11     warfarin (COUMADIN) 2 MG tablet Take 4 mg by mouth daily On 7/1 and 7/2, then recheck [facility]R at [facility] and take as directed       No facility-administered medications prior to visit.      ROS:   CONSTITUTIONAL: Denies fever, chills, fatigue, or weight fluctuations.   HEENT: Denies headache and changes in speech. He complains of vision changes.   CV: Refer to HPI.   PULMONARY:Denies shortness of breath, cough, or previous TB exposure.   GI:Denies nausea, vomiting, diarrhea, and abdominal pain. Bowel movements are regular.   :Denies urinary alterations, dysuria, urinary frequency, hematuria, and abnormal drainage.   EXT:Denies lower extremity edema.   SKIN:Denies abnormal rashes or lesions.   MUSCULOSKELETAL:Denies upper or lower extremity weakness and pain.  "  NEUROLOGIC:Denies lightheadedness, dizziness, seizures, or upper or lower extremity paresthesia.     EXAM:  /65  Pulse 64  Ht 1.626 m (5' 4\")  Wt 75.6 kg (166 lb 11.2 oz)  SpO2 96%  BMI 28.61 kg/m2  GENERAL: Appears alert and oriented times three.   HEENT: Eye symmetrical and free of discharge bilaterally. Mucous membranes moist and without lesions.  NECK: Supple and without lymphadenopathy.   CV: RRR, S1S2 present without murmur, rub, or gallop. JVP is flat  RESPIRATORY: Respirations regular, even, and unlabored. Lungs CTA throughout.   GI: Soft and non distended with normoactive bowel sounds present in all quadrants. No tenderness, rebound, guarding. No organomegaly.    EXTREMITIES: No peripheral edema. 2+ bilateral pedal pulses.   NEUROLOGIC: Alert and orientated x 3. CN II-XII grossly intact. No focal deficits.   MUSCULOSKELETAL: No joint swelling or tenderness.   SKIN: No jaundice. No rashes or lesions.     Labs:  CBC RESULTS:  Lab Results   Component Value Date    WBC 6.1 07/24/2018    RBC 3.45 (L) 07/24/2018    HGB 9.9 (L) 07/24/2018    HCT 32.6 (L) 07/24/2018    MCV 95 07/24/2018    MCH 28.7 07/24/2018    MCHC 30.4 (L) 07/24/2018    RDW 14.9 07/24/2018     07/24/2018       CMP RESULTS:  Lab Results   Component Value Date     10/03/2018    POTASSIUM 4.0 10/03/2018    CHLORIDE 105 10/03/2018    CO2 22 10/03/2018    ANIONGAP 10 10/03/2018     (H) 10/03/2018    BUN 32 (H) 10/03/2018    CR 1.54 (H) 10/03/2018    GFRESTIMATED 47 (L) 10/03/2018    GFRESTBLACK 57 (L) 10/03/2018    ARLENE 8.7 10/03/2018    BILITOTAL 1.0 07/19/2018    ALBUMIN 2.6 (L) 07/19/2018    ALKPHOS 121 07/19/2018    ALT 88 (H) 07/19/2018    AST 39 07/19/2018        INR RESULTS:  Lab Results   Component Value Date    INR 1.46 (H) 08/06/2018       Lab Results   Component Value Date    MAG 2.1 07/24/2018     Lab Results   Component Value Date    NTBNPI 5062 (H) 07/16/2018     Lab Results   Component Value Date    " NTBNP 3948 (H) 2018       Diagnostics:  Recent Results (from the past 4320 hour(s))   ECHO COMPLETE WITH OPTISON    Narrative    [id number]  [id number]  [id number]  [id number]^STEFANIA^MANNY^JANIE           [facility],[place]  Echocardiography Laboratory  [address] [place] [id number]     Name: ADILSON RINCON  MRN: [id number]  : 1960  Study Date: 2018 11:27 AM  Age: 57 yrs  Gender: Male  [place] [facility] [place] Study: Cardiac Arrest  Ordering Physician: MANNY AGUIAR  Referring Physician: SELF, REFERRED  Performed By: Laya Read RDCS     BSA: 1.8 m2  Height: 64 in  Weight: 170 lb  HR: 78  BP: 99/76 mmHg  _____________________________________________________________________________  __        Procedure  Echocardiogram with two-dimensional, color and spectral Doppler performed.  Contrast Optison. Optison (NDC #8548-9140-96) given intravenously. Patient was  given 5 ml mixture of 3 ml Optison and 6 ml saline. 4 ml wasted.  _____________________________________________________________________________  __        Interpretation Summary  Left ventricular wall thickness is normal. Borderline left ventricular  dilation is present. LVIDd is 5.19cm The Ejection Fraction is estimated at 20-  25%. The mid to distal anteroseptum, mid to distal anterior wall, mid to  distal anterolateral wall and mid to apical septum and apex are akinetic. This  is consistent with large LAD territory infarct. Laminar thrombus is seen in  the LV apex  The right ventricle is normal size. Difficult to evaluate RV function but it  appears to be mild to moderately reduced.  Moderate tricuspid insufficiency is present. Right ventricular systolic  pressure is 26mmHg above the right atrial pressure.  Dilation of the inferior vena cava is present with normal respiratory  variation in diameter. Estimated mean right atrial pressure is 8 mmHg (mildly  elevated).  No pericardial  effusion is present.     Previous study not available for comparison.  _____________________________________________________________________________  __        Left Ventricle  Left ventricular wall thickness is normal. Borderline left ventricular  dilation is present. LVIDd is 5.19cm. Grade III or advanced diastolic  dysfunction. The Ejection Fraction is estimated at 20-25%. The mid to distal  anteroseptum, mid to distal anterior  wall, mid to distal anterolateral wall and mid to apical septum and apex are  akinetic. This is consistent with large LAD territory infarct. Laminar  thrombus is seen in the LV apex.     Right Ventricle  Right ventricular wall thickness is normal. The right ventricle is normal  size. Difficult to evaluate RV function but it appears to be mild to  moderately reduced. A pacemaker lead is noted in the right ventricle.     Atria  Moderate left atrial enlargement is present. Moderate right atrial enlargement  is present. A pacemaker lead is noted in the right atrium.     Mitral Valve  The mitral valve is normal. Trace mitral insufficiency is present.        Aortic Valve  Aortic valve is normal in structure and function. The aortic valve is  tricuspid.     Tricuspid Valve  The tricuspid valve is normal. Moderate tricuspid insufficiency is present.  The right ventricular systolic pressure is approximated at 26.0 mmHg plus the  right atrial pressure. Right ventricular systolic pressure is 26mmHg above the  right atrial pressure.     Pulmonic Valve  The pulmonic valve is normal. Trace pulmonic insufficiency is present.     Vessels  The aorta root is normal. Dilation of the inferior vena cava is present with  normal respiratory variation in diameter. Estimated mean right atrial pressure  is 8 mmHg (mildly elevated).     Pericardium  No pericardial effusion is present.        Compared to Previous Study  Previous study not available for  comparison.  _____________________________________________________________________________  __  MMode/2D Measurements & Calculations  IVSd: 0.74 cm     LVIDd: 5.2 cm  LVIDs: 4.0 cm  LVPWd: 1.0 cm  FS: 23.3 %  LV mass(C)d: 167.6 grams  LV mass(C)dI: 91.8 grams/m2  asc Aorta Diam: 3.6 cm  LVOT diam: 2.1 cm  LVOT area: 3.5 cm2  LA Volume (BP): 81.1 ml  LA Volume Index (BP): 44.3 ml/m2  RWT: 0.40           Doppler Measurements & Calculations  MV E max sasha: 101.0 cm/sec  MV A max sasha: 16.5 cm/sec  MV E/A: 6.1  MV dec slope: 660.0 cm/sec2  PA V2 max: 96.5 cm/sec  PA max PG: 3.7 mmHg  PA acc time: 0.11 sec  TR max sasha: 255.0 cm/sec  TR max P.0 mmHg  E/E' av.8  Lateral E/e': 11.0  Medial E/e': 34.5     _____________________________________________________________________________  __           Report approved by: LEÓN Powell 2018 02:05 PM        6 MWT:  1260 feet     Cardiopulmonary Stress test 18  Walked 7 min 22 seconds. MVO2 12.5.  RER 1.1, VE/VCO2 slope: 48.24. /52 at rest, 105/59 exercise.    Heart catheterization 18 at OSH       Assessment and Plan:   Mr. 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 He is quite limited but appears stable today. He is warm and dry with stable renal function and vital signs. We'll make no changes today. He will follow up with Dr. Ortiz, as scheduled in 2 weeks and was encouraged to call with any concerns.    Chronic systolic heart failure secondary to ICM.    Stage D  NYHA Class II-IIIA  ACEi/ARB Hydralazine 50 mg po TID.   BB Toprol XL 12.5 mg po BID.  Aldosterone antagonist contraindicated due to renal dysfunction  SCD prophylaxis ICD.  Fluid status Euvolemic. Continue Lasix 10 mg po BID.   Sleep Apnea Evaluation: Encouraged to pursue sleep study previously     HTN.   Stable  on current regimen.    CAD. s/p STEMI with ALYSSA to LAD 6/27/18  Continue Crestor, Plavix, and Toprol XL.     PAF. CHADSVASC-4.   Coumadin per [facility]. Continue Toprol XL.     VT/VF. With cardiac arrest.   No recorded arrhythmias since 7/16/18    LV thrombus.   Coumadin per [facility] clinic    25 minutes spent in direct care, >50% in counseling        CC  ALETHEA VOGT M

## 2018-10-03 NOTE — NURSING NOTE
Chief Complaint   Patient presents with     Follow Up For     Return CORE; 57 year old male with HFrEF with EF 15%, presenting for HF follow-up with labs prior.     Vitals were taken and medications were reconciled.     Evette Garcia RMA  1:49 PM

## 2018-10-03 NOTE — LETTER
10/3/2018      RE: Mariusz Sharp  [address] [facility] [address]       Dear Colleague,    Thank you for the opportunity to participate in the care of your patient, Mariusz Sharp, at the [facility] HEART [facility]. Please see a copy of my visit note below.    HPI:   Mr. 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 He was last seen in clinic 3 weeks ago when no changes were made. He returns for follow up.    Mariusz reports his symptoms vary daily. On his worst days, he is limited by significant nausea and early satiety. He notes occasional fleeting chest pain. He denies antonieta shortness of breath but also notes he struggles to climb the slight incline of his driveway. No orthopnea, PND, palpitations, or edema.     PAST MEDICAL HISTORY:  Past Medical History:   Diagnosis Date     Acute kidney injury (H)      CKD (chronic kidney disease)      Coronary artery disease      Diabetes mellitus (H)      HTN (hypertension)      Hyperlipidemia      Ischemic cardiomyopathy      Paroxysmal atrial flutter (H)      Systolic heart failure (H)      Ventricular tachycardia (H)        FAMILY HISTORY:  No family history on file.    SOCIAL HISTORY:  Social History     Social History     Marital status: Single     Spouse name: N/A     Number of children: N/A     Years of education: N/A     Social History Main Topics     Smoking status: Never Smoker     Smokeless tobacco: Never Used     Alcohol use Not on file     Drug use: Not on file     Sexual activity: Not on file     Other Topics Concern     Not on file     Social History Narrative       CURRENT MEDICATIONS:  Outpatient Medications Prior to Visit   Medication Sig Dispense Refill     amiodarone (PACERONE/CODARONE) 200 MG tablet Take 1 tablet (200 mg) by mouth  daily 60 tablet 11     clopidogrel (PLAVIX) 75 MG tablet Take 75 mg by mouth daily       digoxin (LANOXIN) 125 MCG tablet Take 1 tablet (125 mcg) by mouth daily 30 tablet 11     famotidine (PEPCID) 20 MG tablet Take 20 mg by mouth 2 times daily       furosemide (LASIX) 20 MG tablet Take 0.5 tablets (10 mg) by mouth 2 times daily 30 tablet 0     hydrALAZINE (APRESOLINE) 50 MG tablet Take 1 tablet (50 mg) by mouth 3 times daily 120 tablet 11     insulin aspart (NOVOLOG PEN) 100 UNIT/ML injection Less than 120 mg/dL = 0 Units  120-149 mg/dL = 1 Unit  150-199 mg/dL = 2 Units  200-249 mg/dL = 3 Units  250-299 mg/dL = 5 Units  300-349 mg/dL = 7 Units  350 mg/dL or greater = 8 Units       insulin glargine (LANTUS SOLOSTAR) 100 UNIT/ML pen Inject 20 Units Subcutaneous every evening 3 mL 11     isosorbide mononitrate (IMDUR) 30 MG 24 hr tablet Take 1 tablet (30 mg) by mouth daily 30 tablet 11     metFORMIN (GLUCOPHAGE) 500 MG tablet Take 500 mg by mouth 2 times daily (with meals)       metoprolol succinate (TOPROL-XL) 25 MG 24 hr tablet Take 0.5 tablets (12.5 mg) by mouth 2 times daily 30 tablet 11     nitroGLYcerin (NITROSTAT) 0.4 MG sublingual tablet Place 0.4 mg under the tongue every 5 minutes as needed for chest pain For chest pain place 1 tablet under the tongue every 5 minutes for 3 doses. If symptoms persist 5 minutes after 1st dose call 911.       rosuvastatin (CRESTOR) 20 MG tablet Take 1 tablet (20 mg) by mouth At Bedtime 30 tablet 11     warfarin (COUMADIN) 2 MG tablet Take 4 mg by mouth daily On 7/1 and 7/2, then recheck INR at [facility] and take as directed       No facility-administered medications prior to visit.      ROS:   CONSTITUTIONAL: Denies fever, chills, fatigue, or weight fluctuations.   HEENT: Denies headache and changes in speech. He complains of vision changes.   CV: Refer to HPI.   PULMONARY:Denies shortness of breath, cough, or previous TB exposure.   GI:Denies nausea,  "vomiting, diarrhea, and abdominal pain. Bowel movements are regular.   :Denies urinary alterations, dysuria, urinary frequency, hematuria, and abnormal drainage.   EXT:Denies lower extremity edema.   SKIN:Denies abnormal rashes or lesions.   MUSCULOSKELETAL:Denies upper or lower extremity weakness and pain.   NEUROLOGIC:Denies lightheadedness, dizziness, seizures, or upper or lower extremity paresthesia.     EXAM:  /65  Pulse 64  Ht 1.626 m (5' 4\")  Wt 75.6 kg (166 lb 11.2 oz)  SpO2 96%  BMI 28.61 kg/m2  GENERAL: Appears alert and oriented times three.   HEENT: Eye symmetrical and free of discharge bilaterally. Mucous membranes moist and without lesions.  NECK: Supple and without lymphadenopathy.   CV: RRR, S1S2 present without murmur, rub, or gallop. JVP is flat  RESPIRATORY: Respirations regular, even, and unlabored. Lungs CTA throughout.   GI: Soft and non distended with normoactive bowel sounds present in all quadrants. No tenderness, rebound, guarding. No organomegaly.    EXTREMITIES: No peripheral edema. 2+ bilateral pedal pulses.   NEUROLOGIC: Alert and orientated x 3. CN II-XII grossly intact. No focal deficits.   MUSCULOSKELETAL: No joint swelling or tenderness.   SKIN: No jaundice. No rashes or lesions.     Labs:  CBC RESULTS:  Lab Results   Component Value Date    WBC 6.1 07/24/2018    RBC 3.45 (L) 07/24/2018    HGB 9.9 (L) 07/24/2018    HCT 32.6 (L) 07/24/2018    MCV 95 07/24/2018    MCH 28.7 07/24/2018    MCHC 30.4 (L) 07/24/2018    RDW 14.9 07/24/2018     07/24/2018       CMP RESULTS:  Lab Results   Component Value Date     10/03/2018    POTASSIUM 4.0 10/03/2018    CHLORIDE 105 10/03/2018    CO2 22 10/03/2018    ANIONGAP 10 10/03/2018     (H) 10/03/2018    BUN 32 (H) 10/03/2018    CR 1.54 (H) 10/03/2018    GFRESTIMATED 47 (L) 10/03/2018    GFRESTBLACK 57 (L) 10/03/2018    ARLENE 8.7 10/03/2018    BILITOTAL 1.0 07/19/2018    ALBUMIN 2.6 (L) 07/19/2018    ALKPHOS 121 " 2018    ALT 88 (H) 2018    AST 39 2018        INR RESULTS:  Lab Results   Component Value Date    INR 1.46 (H) 2018       Lab Results   Component Value Date    MAG 2.1 2018     Lab Results   Component Value Date    NTBNPI 5062 (H) 2018     Lab Results   Component Value Date    NTBNP 3948 (H) 2018       Diagnostics:  Recent Results (from the past 4320 hour(s))   ECHO COMPLETE WITH OPTISON    Narrative    [id number]  [id number]  [id number]  [id number]^STEFANIA^MANNY^V           [facility]  Echocardiography Laboratory  [address] [place] [address]     Name: ADILSON RINCON  MRN: [id number]  : 1960  Study Date: 2018 11:27 AM  Age: 57 yrs  Gender: Male  [place] [facility]  Reason For Study: Cardiac Arrest  Ordering Physician: MANNY AGUIAR  Referring Physician: SELF, REFERRED  Performed By: Laya Read RDCS     BSA: 1.8 m2  Height: 64 in  Weight: 170 lb  HR: 78  BP: 99/76 mmHg  _____________________________________________________________________________  __        Procedure  Echocardiogram with two-dimensional, color and spectral Doppler performed.  Contrast Optison. Optison (NDC #5209-2809-16) given intravenously. Patient was  given 5 ml mixture of 3 ml Optison and 6 ml saline. 4 ml wasted.  _____________________________________________________________________________  __        Interpretation Summary  Left ventricular wall thickness is normal. Borderline left ventricular  dilation is present. LVIDd is 5.19cm The Ejection Fraction is estimated at 20-  25%. The mid to distal anteroseptum, mid to distal anterior wall, mid to  distal anterolateral wall and mid to apical septum and apex are akinetic. This  is consistent with large LAD territory infarct. Laminar thrombus is seen in  the LV apex  The right ventricle is normal size. Difficult to evaluate RV function but it  appears to be mild to moderately  reduced.  Moderate tricuspid insufficiency is present. Right ventricular systolic  pressure is 26mmHg above the right atrial pressure.  Dilation of the inferior vena cava is present with normal respiratory  variation in diameter. Estimated mean right atrial pressure is 8 mmHg (mildly  elevated).  No pericardial effusion is present.     Previous study not available for comparison.  _____________________________________________________________________________  __        Left Ventricle  Left ventricular wall thickness is normal. Borderline left ventricular  dilation is present. LVIDd is 5.19cm. Grade III or advanced diastolic  dysfunction. The Ejection Fraction is estimated at 20-25%. The mid to distal  anteroseptum, mid to distal anterior  wall, mid to distal anterolateral wall and mid to apical septum and apex are  akinetic. This is consistent with large LAD territory infarct. Laminar  thrombus is seen in the LV apex.     Right Ventricle  Right ventricular wall thickness is normal. The right ventricle is normal  size. Difficult to evaluate RV function but it appears to be mild to  moderately reduced. A pacemaker lead is noted in the right ventricle.     Atria  Moderate left atrial enlargement is present. Moderate right atrial enlargement  is present. A pacemaker lead is noted in the right atrium.     Mitral Valve  The mitral valve is normal. Trace mitral insufficiency is present.        Aortic Valve  Aortic valve is normal in structure and function. The aortic valve is  tricuspid.     Tricuspid Valve  The tricuspid valve is normal. Moderate tricuspid insufficiency is present.  The right ventricular systolic pressure is approximated at 26.0 mmHg plus the  right atrial pressure. Right ventricular systolic pressure is 26mmHg above the  right atrial pressure.     Pulmonic Valve  The pulmonic valve is normal. Trace pulmonic insufficiency is present.     Vessels  The aorta root is normal. Dilation of the inferior vena cava  is present with  normal respiratory variation in diameter. Estimated mean right atrial pressure  is 8 mmHg (mildly elevated).     Pericardium  No pericardial effusion is present.        Compared to Previous Study  Previous study not available for comparison.  _____________________________________________________________________________  __  MMode/2D Measurements & Calculations  IVSd: 0.74 cm     LVIDd: 5.2 cm  LVIDs: 4.0 cm  LVPWd: 1.0 cm  FS: 23.3 %  LV mass(C)d: 167.6 grams  LV mass(C)dI: 91.8 grams/m2  asc Aorta Diam: 3.6 cm  LVOT diam: 2.1 cm  LVOT area: 3.5 cm2  LA Volume (BP): 81.1 ml  LA Volume Index (BP): 44.3 ml/m2  RWT: 0.40           Doppler Measurements & Calculations  MV E max sasha: 101.0 cm/sec  MV A max sasha: 16.5 cm/sec  MV E/A: 6.1  MV dec slope: 660.0 cm/sec2  PA V2 max: 96.5 cm/sec  PA max PG: 3.7 mmHg  PA acc time: 0.11 sec  TR max sasha: 255.0 cm/sec  TR max P.0 mmHg  E/E' av.8  Lateral E/e': 11.0  Medial E/e': 34.5     _____________________________________________________________________________  __           Report approved by: LEÓN Powell 2018 02:05 PM        6 MWT:  1260 feet     Cardiopulmonary Stress test 18  Walked 7 min 22 seconds. MVO2 12.5.  RER 1.1, VE/VCO2 slope: 48.24. /52 at rest, 105/59 exercise.    Heart catheterization 18 at OSH       Assessment and Plan:   Mr. 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 He is quite limited but appears stable today. He is warm and dry with stable renal function and vital signs. We'll make no changes today. He will follow up with Dr. Ortiz, as scheduled in 2 weeks and was encouraged to call with any concerns.    Chronic systolic heart failure secondary to ICM.    Stage D  NYHA Class II-IIIA  ACEi/ARB Hydralazine 50 mg po TID.   BB Toprol  XL 12.5 mg po BID.  Aldosterone antagonist contraindicated due to renal dysfunction  SCD prophylaxis ICD.  Fluid status Euvolemic. Continue Lasix 10 mg po BID.   Sleep Apnea Evaluation: Encouraged to pursue sleep study previously     HTN.   Stable on current regimen.    CAD. s/p STEMI with ALYSSA to LAD 6/27/18  Continue Crestor, Plavix, and Toprol XL.     PAF. CHADSVASC-4.   Coumadin per AC. Continue Toprol XL.     VT/VF. With cardiac arrest.   No recorded arrhythmias since 7/16/18    LV thrombus.   Coumadin per [facility] clinic    25 minutes spent in direct care, >50% in counseling        CC  ALETHEA VOGT      Please do not hesitate to contact me if you have any questions/concerns.     Sincerely,     Meredith Garcia, DELILAH

## 2018-10-03 NOTE — MR AVS SNAPSHOT
After Visit Summary   10/3/2018    Adilson Sharp    MRN: [id number]           Patient Information     Date Of Birth          1960        Visit Information        Provider Department      10/3/2018 2:30 PM Meredith Garcia NP [facility] Heart Care        Today's Diagnoses     Systolic heart failure (H)          Care Instructions    You were seen today in the Cardiovascular Clinic at the [facility].     Cardiology Providers you saw during your visit: Meredith Garcia NP     1. No changes today. You look good today and your labs and vital signs are very reassuring. Don't hesitate to call us with any concerns.    2. Return to see Dr. Ortiz, as scheduled in roughly 2 weeks    3. Eat several small meals throughout the day rather than large meals. Budget your energy to avoid exhaustion. Again, don't hesitate to call with any concerns.      Results for ADILSON SHARP (MRN [id number]) as of 10/3/2018 14:19   Ref. Range 10/3/2018 12:49   Sodium Latest Ref Range: 133 - 144 mmol/L 138   Potassium Latest Ref Range: 3.4 - 5.3 mmol/L 4.0   Chloride Latest Ref Range: 94 - 109 mmol/L 105   Carbon Dioxide Latest Ref Range: 20 - 32 mmol/L 22   Urea Nitrogen Latest Ref Range: 7 - 30 mg/dL 32 (H)   Creatinine Latest Ref Range: 0.66 - 1.25 mg/dL 1.54 (H)   GFR Estimate Latest Ref Range: >60 mL/min/1.7m2 47 (L)   GFR Estimate If Black Latest Ref Range: >60 mL/min/1.7m2 57 (L)   Calcium Latest Ref Range: 8.5 - 10.1 mg/dL 8.7   Anion Gap Latest Ref Range: 3 - 14 mmol/L 10   Glucose Latest Ref Range: 70 - 99 mg/dL 243 (H)       Please limit your fluid intake to 2 L (64 ounces) daily.  2 Liters a day = 8.5 cups, or 64 ounces.  Please limit your salt intake to 2 grams a day or less.     If you gain 2# in 24 hours or 5# in one week call China Scott RN so we can adjust your medications as needed over the phone.     Please feel free to call me with any questions or concerns.       China Scott RN  "BSN CHFN  [facility]  Cardiology Care Coordinator-Heart Failure Clinic     Questions and [phone].   First press #1 for the University and then press #3 for \"Medical Questions\" to reach us Cardiology Nurses.      On Call Cardiologist for after hours or on weekends: [phone]   option #4 and ask to speak to the on-call Cardiologist. Inform them you are a CORE/heart failure patient at the [place].           If you need a medication refill please contact your pharmacy.  Please allow 3 business days for your refill to be completed.  _______________________________________________________  C.O.R.E. CLINIC Cardiomyopathy, Optimization, Rehabilitation, Education   The C.O.R.E. CLINIC is a heart failure specialty clinic within the [facility] where you will work with specialized nurse practitioners dedicated to helping patients with heart failure carefully adjust medications, receive education, and learn who and when to call if symptoms develop. They specialize in helping you better understand your condition, slow the progression of your disease, improve the length and quality of your life, help you detect future heart problems before they become life threatening, and avoid hospitalizations.  As always, thank you for trusting us with your health care needs!           Follow-ups after your visit        Your next 10 appointments already scheduled     Oct 17, 2018  1:00 PM CDT   Card Cardpul Stress Tst Adult with [facility]   [facility] ([facility] [place]    [address] [address]               Oct 17, 2018  2:30 PM CDT   (Arrive by 2:15 PM)   RETURN HEART FAILURE with Jenni Ortiz MD   [facility] ([facility])    [address]  [address]  [place] [facility] [address] [phone]              Who to contact     If you have questions or need " "follow up information about today's clinic visit or your schedule please contact [facility] directly at [phone].  Normal or non-critical lab and imaging results will be communicated to you by MyChart, letter or phone within 4 business days after the clinic has received the results. If you do not hear from us within 7 days, please contact the clinic through MyChart or phone. If you have a critical or abnormal lab result, we will notify you by phone as soon as possible.  Submit refill requests through [employer] or call your pharmacy and they will forward the refill request to us. Please allow 3 business days for your refill to be completed.          Additional Information About Your Visit        Care EveryWhere ID     This is your Care EveryWhere ID. This could be used by other organizations to access your [facility] medical records  [id number]        Your Vitals Were     Pulse Height Pulse Oximetry BMI (Body Mass Index)          64 1.626 m (5' 4\") 96% 28.61 kg/m2         Blood Pressure from Last 3 Encounters:   10/03/18 100/65   09/12/18 113/78   08/29/18 108/76    Weight from Last 3 Encounters:   10/03/18 75.6 kg (166 lb 11.2 oz)   09/12/18 76.9 kg (169 lb 8 oz)   08/29/18 77.2 kg (170 lb 3.2 oz)              We Performed the Following     Follow-Up with [facility]        Primary Care Provider Office Phone # Fax #    Estephania [facility] [phone]       [address] [place] [id number]        Equal Access to Services     GILLIAN COX AH: Hadii can ku hadasho Soomaali, waaxda luqadaha, qaybta kaalmada adeegyamelida, wax ivan gibbons. So [facility] [phone].    ATENCIÓN: Si habla crystal, tiene a waddell disposición servicios gratuitos de asistencia lingüística. Llame al [phone].    We comply with applicable federal civil rights laws and [place] laws. We do not discriminate on the basis of race, color, national origin, age, disability, sex, sexual orientation, " or gender identity.            Thank you!     Thank you for choosing [facility]  for your care. Our goal is always to provide you with excellent care. Hearing back from our patients is one way we can continue to improve our services. Please take a few minutes to complete the written survey that you may receive in the mail after your visit with us. Thank you!             Your Updated Medication List - Protect others around you: Learn how to safely use, store and throw away your medicines at www.disposemymeds.org.          This list is accurate as of 10/3/18  2:33 PM.  Always use your most recent med list.                   Brand Name Dispense Instructions for use Diagnosis    amiodarone 200 MG tablet    PACERONE/CODARONE    60 tablet    Take 1 tablet (200 mg) by mouth daily    Acute systolic heart failure (H)       clopidogrel 75 MG tablet    PLAVIX     Take 75 mg by mouth daily        digoxin 125 MCG tablet    LANOXIN    30 tablet    Take 1 tablet (125 mcg) by mouth daily    Acute systolic heart failure (H)       famotidine 20 MG tablet    PEPCID     Take 20 mg by mouth 2 times daily        furosemide 20 MG tablet    LASIX    30 tablet    Take 0.5 tablets (10 mg) by mouth 2 times daily    Chronic systolic heart failure (H)       hydrALAZINE 50 MG tablet    APRESOLINE    120 tablet    Take 1 tablet (50 mg) by mouth 3 times daily    Acute systolic heart failure (H)       insulin aspart 100 UNIT/ML injection    NovoLOG PEN     Less than 120 mg/dL = 0 Units 120-149 mg/dL = 1 Unit 150-199 mg/dL = 2 Units 200-249 mg/dL = 3 Units 250-299 mg/dL = 5 Units 300-349 mg/dL = 7 Units 350 mg/dL or greater = 8 Units        insulin glargine 100 UNIT/ML injection    LANTUS    3 mL    Inject 20 Units Subcutaneous every evening    Type 2 diabetes mellitus with hyperglycemia, with long-term current use of insulin (H)       isosorbide mononitrate 30 MG 24 hr tablet    IMDUR    30 tablet    Take 1 tablet (30 mg) by mouth daily     Acute systolic heart failure (H)       metFORMIN 500 MG tablet    GLUCOPHAGE     Take 500 mg by mouth 2 times daily (with meals)        metoprolol succinate 25 MG 24 hr tablet    TOPROL-XL    30 tablet    Take 0.5 tablets (12.5 mg) by mouth 2 times daily        NITROSTAT 0.4 MG sublingual tablet   Generic drug:  nitroGLYcerin      Place 0.4 mg under the tongue every 5 minutes as needed for chest pain For chest pain place 1 tablet under the tongue every 5 minutes for 3 doses. If symptoms persist 5 minutes after 1st dose call 911.        rosuvastatin 20 MG tablet    CRESTOR    30 tablet    Take 1 tablet (20 mg) by mouth At Bedtime    Acute systolic heart failure (H)       warfarin 2 MG tablet    COUMADIN     Take 4 mg by mouth daily On 7/1 and 7/2, then recheck INR at [facility] and take as directed           no fever and no chills.

## 2018-10-03 NOTE — PATIENT INSTRUCTIONS
"You were seen today in the Cardiovascular Clinic at the [facility].     Cardiology Providers you saw during your visit: Meredith Garcia NP     1. No changes today. You look good today and your labs and vital signs are very reassuring. Don't hesitate to call us with any concerns.    2. Return to see Dr. Ortiz, as scheduled in roughly 2 weeks    3. Eat several small meals throughout the day rather than large meals. Budget your energy to avoid exhaustion. Again, don't hesitate to call with any concerns.      Results for ADILSON RINCON (MRN [id number]) as of 10/3/2018 14:19   Ref. Range 10/3/2018 12:49   Sodium Latest Ref Range: 133 - 144 mmol/L 138   Potassium Latest Ref Range: 3.4 - 5.3 mmol/L 4.0   Chloride Latest Ref Range: 94 - 109 mmol/L 105   Carbon Dioxide Latest Ref Range: 20 - 32 mmol/L 22   Urea Nitrogen Latest Ref Range: 7 - 30 mg/dL 32 (H)   Creatinine Latest Ref Range: 0.66 - 1.25 mg/dL 1.54 (H)   GFR Estimate Latest Ref Range: >60 mL/min/1.7m2 47 (L)   GFR Estimate If Black Latest Ref Range: >60 mL/min/1.7m2 57 (L)   Calcium Latest Ref Range: 8.5 - 10.1 mg/dL 8.7   Anion Gap Latest Ref Range: 3 - 14 mmol/L 10   Glucose Latest Ref Range: 70 - 99 mg/dL 243 (H)       Please limit your fluid intake to 2 L (64 ounces) daily.  2 Liters a day = 8.5 cups, or 64 ounces.  Please limit your salt intake to 2 grams a day or less.     If you gain 2# in 24 hours or 5# in one week call China Scott RN so we can adjust your medications as needed over the phone.     Please feel free to call me with any questions or concerns.       China Scott RN BSN CHFN  [facility]  Cardiology Care Coordinator-Heart Failure Clinic     Questions and [phone].   First press #1 for the University and then press #3 for \"Medical Questions\" to reach us Cardiology Nurses.      On Call Cardiologist for after hours or on weekends: [phone]   option #4 and ask to speak to the " on-call Cardiologist. Inform them you are a CORE/heart failure patient at the [facility].           If you need a medication refill please contact your pharmacy.  Please allow 3 business days for your refill to be completed.  _______________________________________________________  C.O.R.E. CLINIC Cardiomyopathy, Optimization, Rehabilitation, Education   The C.O.R.E. CLINIC is a heart failure specialty clinic within the [facility] where you will work with specialized nurse practitioners dedicated to helping patients with heart failure carefully adjust medications, receive education, and learn who and when to call if symptoms develop. They specialize in helping you better understand your condition, slow the progression of your disease, improve the length and quality of your life, help you detect future heart problems before they become life threatening, and avoid hospitalizations.  As always, thank you for trusting us with your health care needs!

## 2018-10-16 DIAGNOSIS — I50.22 CHRONIC SYSTOLIC HEART FAILURE (H): ICD-10-CM

## 2018-10-16 RX ORDER — FUROSEMIDE 20 MG
10 TABLET ORAL 2 TIMES DAILY
Qty: 30 TABLET | Refills: 3 | Status: SHIPPED | OUTPATIENT
Start: 2018-10-16 | End: 2018-10-17

## 2018-10-17 ENCOUNTER — HOSPITAL ENCOUNTER (OUTPATIENT)
Dept: CARDIOLOGY | Facility: CLINIC | Age: 58
Discharge: HOME | End: 2018-10-17
Attending: INTERNAL MEDICINE | Admitting: INTERNAL MEDICINE
Payer: COMMERCIAL

## 2018-10-17 ENCOUNTER — OFFICE VISIT (OUTPATIENT)
Dept: CARDIOLOGY | Facility: CLINIC | Age: 58
End: 2018-10-17
Attending: INTERNAL MEDICINE
Payer: COMMERCIAL

## 2018-10-17 VITALS
WEIGHT: 173 LBS | DIASTOLIC BLOOD PRESSURE: 59 MMHG | BODY MASS INDEX: 29.53 KG/M2 | HEART RATE: 57 BPM | SYSTOLIC BLOOD PRESSURE: 91 MMHG | HEIGHT: 64 IN | OXYGEN SATURATION: 98 %

## 2018-10-17 DIAGNOSIS — I50.22 CHRONIC SYSTOLIC HEART FAILURE (H): ICD-10-CM

## 2018-10-17 DIAGNOSIS — I50.21 ACUTE SYSTOLIC HEART FAILURE (H): ICD-10-CM

## 2018-10-17 DIAGNOSIS — I50.22 CHRONIC SYSTOLIC HEART FAILURE (H): Primary | ICD-10-CM

## 2018-10-17 LAB
ALBUMIN SERPL-MCNC: 3.6 G/DL (ref 3.4–5)
ALP SERPL-CCNC: 59 U/L (ref 40–150)
ALT SERPL W P-5'-P-CCNC: 34 U/L (ref 0–70)
ANION GAP SERPL CALCULATED.3IONS-SCNC: 9 MMOL/L (ref 3–14)
AST SERPL W P-5'-P-CCNC: 25 U/L (ref 0–45)
BILIRUB SERPL-MCNC: 0.7 MG/DL (ref 0.2–1.3)
BUN SERPL-MCNC: 38 MG/DL (ref 7–30)
CALCIUM SERPL-MCNC: 8.7 MG/DL (ref 8.5–10.1)
CHLORIDE SERPL-SCNC: 106 MMOL/L (ref 94–109)
CO2 SERPL-SCNC: 23 MMOL/L (ref 20–32)
CREAT SERPL-MCNC: 1.62 MG/DL (ref 0.66–1.25)
GFR SERPL CREATININE-BSD FRML MDRD: 44 ML/MIN/1.7M2
GLUCOSE SERPL-MCNC: 169 MG/DL (ref 70–99)
HBA1C MFR BLD: 8.6 % (ref 0–5.6)
POTASSIUM SERPL-SCNC: 4.1 MMOL/L (ref 3.4–5.3)
PROT SERPL-MCNC: 7.1 G/DL (ref 6.8–8.8)
SODIUM SERPL-SCNC: 138 MMOL/L (ref 133–144)

## 2018-10-17 PROCEDURE — 94621 CARDIOPULM EXERCISE TESTING: CPT | Performed by: INTERNAL MEDICINE

## 2018-10-17 PROCEDURE — G0463 HOSPITAL OUTPT CLINIC VISIT: HCPCS | Mod: 25,ZF

## 2018-10-17 PROCEDURE — 99214 OFFICE O/P EST MOD 30 MIN: CPT | Mod: ZP | Performed by: INTERNAL MEDICINE

## 2018-10-17 PROCEDURE — 36415 COLL VENOUS BLD VENIPUNCTURE: CPT | Performed by: INTERNAL MEDICINE

## 2018-10-17 PROCEDURE — 83036 HEMOGLOBIN GLYCOSYLATED A1C: CPT | Performed by: INTERNAL MEDICINE

## 2018-10-17 PROCEDURE — 94621 CARDIOPULM EXERCISE TESTING: CPT | Mod: 26 | Performed by: INTERNAL MEDICINE

## 2018-10-17 PROCEDURE — 80053 COMPREHEN METABOLIC PANEL: CPT | Performed by: INTERNAL MEDICINE

## 2018-10-17 RX ORDER — FUROSEMIDE 20 MG
20 TABLET ORAL 2 TIMES DAILY
Qty: 180 TABLET | Refills: 3 | Status: SHIPPED | OUTPATIENT
Start: 2018-10-17 | End: 2018-11-15

## 2018-10-17 ASSESSMENT — PAIN SCALES - GENERAL: PAINLEVEL: NO PAIN (0)

## 2018-10-17 NOTE — NURSING NOTE
Chief Complaint   Patient presents with     Follow Up For     HFrEF     Medications reviewed and vitals performed.  Suha Nolan CMA

## 2018-10-17 NOTE — PATIENT INSTRUCTIONS
Cardiology Providers you saw during your visit:  Dr. Ortiz    Medication changes:  Please INCREASE Lasix to 20 mg twice daily    Follow up:  1- Labs today.  2- Labs next week 10/24/2018.  3- Please return to clinic for follow-up:  With Dr. Ortiz on 11/13/2018 at 1130 with labs prior, support group at 1 pm over at the hospitals 7th floor room 7-120, followed by right heart cath.    Right Heart Catheterization      A Right Heart Cath is a test that measures how well your heart is pumping blood to your body and will assess the volume and pressures in your heart.       You are scheduled for a Right Heart Catheterization at the [facility], [address] [place] [address], [place] [address]. You are to check in at the [facility].     When you arrive you will be escorted back to the pre procedure area called 2A. Here they will insert an IV, draw labs, and obtain a short medical history. Please bring an updated list of your current medications.    A physician will come and talk with you about the procedure and obtain consent.    A nurse from the Cardiac Catheterization Lab will then escort you to the procedure area. You will receive a shot to numb the site where the catheter (tube) will enter your body.  This may be in the neck or groin - but likely your neck.  You will not receive sedation or anesthesia.     After the procedure you will recover for a short period (1-2 hours) on 6B or the cath lab.     Pre procedure instructions:     1.  Do not eat any solid food or milk products for 6 hours prior to the exam. You may drink clear liquids until 2 hours prior to the exam. Clear liquids include the following: water, Jell-O, clear broth, apple juice or any non-carbonated drink that you can see through (no pop!).   2. Do not drink alcohol or smoke 24 hours prior to test.  3. Hold these meds:  Do not take your oral diabetic medication or short acting insulin the day of the procedure.        4.  You may take your other morning medications as prescribed with a sip of water. You may hold your diuretic that morning.        Please call if you have:  1. Weight gain of more than 2 pounds in a day or 5 pounds in a week  2. Increased shortness of breath, swelling or bloating  3. Dizziness, lightheadedness   4. Any questions or concerns.     Follow the American Heart Association Diet and Lifestyle recommendations:  Limit saturated fat, trans fat, sodium, red meat, sweets and sugar-sweetened beverages. If you choose to eat red meat, compare labels and select the leanest cuts available.  Aim for at least 150 minutes of moderate physical activity or 75 minutes of vigorous physical activity - or an equal combination of both - each week.    During business hours: [phone], press option # 1 to be routed to the [facility] then option # 3 for medical questions to speak with a nurse     After hours, weekends or holidays: On Call Cardiologist- [phone]   option #4 and ask to speak to the on-call Cardiologist. Inform them you are a CORE/heart failure patient at the [facility].      Rosario Cooney, RN  Cardiology Nurse Care Coordinator    Keep up the good work!    Take Care!

## 2018-10-17 NOTE — MR AVS SNAPSHOT
After Visit Summary   10/17/2018    Mariusz Sharp    MRN: [id number]           Patient Information     Date Of Birth          1960        Visit Information        Provider Department      10/17/2018 2:30 PM Jenni Ortiz MD [facility]        Today's Diagnoses     Chronic systolic heart failure (H)    -  1      Care Instructions    Cardiology Providers you saw during your visit:  Dr. Ortiz    Medication changes:  Please INCREASE Lasix to 20 mg twice daily    Follow up:  1- Labs today.  2- Labs next week 10/24/2018.  3- Please return to clinic for follow-up:  With Dr. Ortiz on 11/13/2018 at 1130 with labs prior, support group at 1 pm over at the [facility] 7th floor room 7-120, followed by right heart cath.    Right Heart Catheterization      A Right Heart Cath is a test that measures how well your heart is pumping blood to your body and will assess the volume and pressures in your heart.       You are scheduled for a Right Heart Catheterization at the [facility], [address] [place] [address], [place] [address]. You are to check in at the [facility].     When you arrive you will be escorted back to the pre procedure area called 2A. Here they will insert an IV, draw labs, and obtain a short medical history. Please bring an updated list of your current medications.    A physician will come and talk with you about the procedure and obtain consent.    A nurse from the Cardiac Catheterization Lab will then escort you to the procedure area. You will receive a shot to numb the site where the catheter (tube) will enter your body.  This may be in the neck or groin - but likely your neck.  You will not receive sedation or anesthesia.     After the procedure you will recover for a short period (1-2 hours) on 6B or the cath lab.     Pre procedure instructions:     1.  Do not eat any solid food or milk products for 6 hours prior to the exam. You may drink  clear liquids until 2 hours prior to the exam. Clear liquids include the following: water, Jell-O, clear broth, apple juice or any non-carbonated drink that you can see through (no pop!).   2. Do not drink alcohol or smoke 24 hours prior to test.  3. Hold these meds:  Do not take your oral diabetic medication or short acting insulin the day of the procedure.        4. You may take your other morning medications as prescribed with a sip of water. You may hold your diuretic that morning.        Please call if you have:  1. Weight gain of more than 2 pounds in a day or 5 pounds in a week  2. Increased shortness of breath, swelling or bloating  3. Dizziness, lightheadedness   4. Any questions or concerns.     Follow the American Heart Association Diet and Lifestyle recommendations:  Limit saturated fat, trans fat, sodium, red meat, sweets and sugar-sweetened beverages. If you choose to eat red meat, compare labels and select the leanest cuts available.  Aim for at least 150 minutes of moderate physical activity or 75 minutes of vigorous physical activity - or an equal combination of both - each week.    During business hours: [phone], press option # 1 to be routed to the [facility] then option # 3 for medical questions to speak with a nurse     After hours, weekends or holidays: On Call Cardiologist- [phone]   option #4 and ask to speak to the on-call Cardiologist. Inform them you are a CORE/heart failure patient at the [facility].      Rosario Cooney, RN  Cardiology Nurse Care Coordinator    Keep up the good work!    Take Care!            Follow-ups after your visit        Additional Services     Follow-Up with Advanced Heart Failure Clinic                 Your next 10 appointments already scheduled     Oct 17, 2018  6:30 PM CDT   Lab with  LAB    Health Lab ([facility])    [address] [place] [id number] [phone]            Nov 13, 2018 11:00 AM CST    Lab with  LAB    Health Lab ([facility])    [address]  1st Floor  [facility] [id number] [phone]            Nov 13, 2018 11:30 AM CST   (Arrive by 11:15 AM)   RETURN HEART FAILURE with Jenni Ortiz MD   [facility] ([facility])    [address]  [address]  [facility] [id number] [phone]            Nov 13, 2018  1:00 PM CST   Procedure - 2.5 hour with U2A ROOM 7   Unit 2A [facility] ([facility])    [address] [address]                 Future tests that were ordered for you today     Open Future Orders        Priority Expected Expires Ordered    Follow-Up with Advanced Heart Failure Clinic Routine 11/13/2018 10/17/2019 10/17/2018    Basic metabolic panel Routine 11/13/2018 10/17/2019 10/17/2018    Basic metabolic panel Routine 10/31/2018 10/17/2019 10/17/2018    Comprehensive metabolic panel Routine 10/17/2018 10/17/2019 10/17/2018    Hemoglobin A1c Routine 10/17/2018 10/17/2019 10/17/2018            Who to contact     If you have questions or need follow up information about today's clinic visit or your schedule please contact [facility] directly at [phone].  Normal or non-critical lab and imaging results will be communicated to you by MyChart, letter or phone within 4 business days after the clinic has received the results. If you do not hear from us within 7 days, please contact the clinic through MyChart or phone. If you have a critical or abnormal lab result, we will notify you by phone as soon as possible.  Submit refill requests through [employer] or call your pharmacy and they will forward the refill request to us. Please allow 3 business days for your refill to be completed.          Additional Information About Your Visit        Care EveryWhere ID     This is your Care EveryWhere ID. This could be used by other organizations  "to access your [facility] medical records  [id number]        Your Vitals Were     Pulse Height Pulse Oximetry BMI (Body Mass Index)          57 1.626 m (5' 4\") 98% 29.7 kg/m2         Blood Pressure from Last 3 Encounters:   10/17/18 91/59   10/03/18 100/65   09/12/18 113/78    Weight from Last 3 Encounters:   10/17/18 78.5 kg (173 lb)   10/03/18 75.6 kg (166 lb 11.2 oz)   09/12/18 76.9 kg (169 lb 8 oz)              We Performed the Following     Follow-Up with Advanced Heart Failure Clinic          Today's Medication Changes          These changes are accurate as of 10/17/18  3:19 PM.  If you have any questions, ask your nurse or doctor.               These medicines have changed or have updated prescriptions.        Dose/Directions    furosemide 20 MG tablet   Commonly known as:  LASIX   This may have changed:  how much to take   Used for:  Chronic systolic heart failure (H)   Changed by:  Jenni Ortiz MD        Dose:  20 mg   Take 1 tablet (20 mg) by mouth 2 times daily   Quantity:  180 tablet   Refills:  3            Where to get your medicines      These medications were sent to [facility] [address] [place] [address] AT [address] [address]    Hours:  24-hours Phone:  [phone]     furosemide 20 MG tablet                Primary Care Provider Office Phone # Fax #    [facility] [phone]       [address] [place] [address]        Equal Access to Services     GILLIAN COX AH: Olena de león Sovicky, waaxda luqadaha, qaybta kaalmada bessie, mynor idiin hayaan adegalileo gibbons. So [facility] [phone].    ATENCIÓN: Si habla español, tiene a waddell disposición servicios gratuitos de asistencia lingüística. Llame al [phone].    We comply with applicable federal civil rights laws and [place] laws. We do not discriminate on the basis of race, color, national origin, age, disability, sex, sexual " orientation, or gender identity.            Thank you!     Thank you for choosing [facility]  for your care. Our goal is always to provide you with excellent care. Hearing back from our patients is one way we can continue to improve our services. Please take a few minutes to complete the written survey that you may receive in the mail after your visit with us. Thank you!             Your Updated Medication List - Protect others around you: Learn how to safely use, store and throw away your medicines at www.disposemymeds.org.          This list is accurate as of 10/17/18  3:19 PM.  Always use your most recent med list.                   Brand Name Dispense Instructions for use Diagnosis    amiodarone 200 MG tablet    PACERONE/CODARONE    60 tablet    Take 1 tablet (200 mg) by mouth daily    Acute systolic heart failure (H)       clopidogrel 75 MG tablet    PLAVIX     Take 75 mg by mouth daily        digoxin 125 MCG tablet    LANOXIN    30 tablet    Take 1 tablet (125 mcg) by mouth daily    Acute systolic heart failure (H)       famotidine 20 MG tablet    PEPCID     Take 20 mg by mouth 2 times daily        furosemide 20 MG tablet    LASIX    180 tablet    Take 1 tablet (20 mg) by mouth 2 times daily    Chronic systolic heart failure (H)       hydrALAZINE 50 MG tablet    APRESOLINE    120 tablet    Take 1 tablet (50 mg) by mouth 3 times daily    Acute systolic heart failure (H)       insulin aspart 100 UNIT/ML injection    NovoLOG PEN     Less than 120 mg/dL = 0 Units 120-149 mg/dL = 1 Unit 150-199 mg/dL = 2 Units 200-249 mg/dL = 3 Units 250-299 mg/dL = 5 Units 300-349 mg/dL = 7 Units 350 mg/dL or greater = 8 Units        insulin glargine 100 UNIT/ML injection    LANTUS    3 mL    Inject 20 Units Subcutaneous every evening    Type 2 diabetes mellitus with hyperglycemia, with long-term current use of insulin (H)       isosorbide mononitrate 30 MG 24 hr tablet    IMDUR    30 tablet    Take 1 tablet (30 mg) by  mouth daily    Acute systolic heart failure (H)       metFORMIN 500 MG tablet    GLUCOPHAGE     Take 500 mg by mouth 2 times daily (with meals)        metoprolol succinate 25 MG 24 hr tablet    TOPROL-XL    30 tablet    Take 0.5 tablets (12.5 mg) by mouth 2 times daily        NITROSTAT 0.4 MG sublingual tablet   Generic drug:  nitroGLYcerin      Place 0.4 mg under the tongue every 5 minutes as needed for chest pain For chest pain place 1 tablet under the tongue every 5 minutes for 3 doses. If symptoms persist 5 minutes after 1st dose call 911.        rosuvastatin 20 MG tablet    CRESTOR    30 tablet    Take 1 tablet (20 mg) by mouth At Bedtime    Acute systolic heart failure (H)       warfarin 2 MG tablet    COUMADIN     Take 4 mg by mouth daily On 7/1 and 7/2, then recheck INR at [facility] and take as directed

## 2018-10-17 NOTE — LETTER
10/17/2018      RE: Mariusz Sharp  [address] [facility], [place] [address]       Dear Colleague,    Thank you for the opportunity to participate in the care of your patient, Mariusz Sharp, at the [facility] HEART [facility]. Please see a copy of my visit note below.    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 He denies any further episode of exertional N/V.  He denies fever, chills, chest pain, palpitations, SOB, change in RADER, PND, orthopnea, vomiting, diarrhea, hematochezia, melena, or LE edema however admits that his exercise capacity and overall energy are declining.       PAST MEDICAL HISTORY:  Past Medical History:   Diagnosis Date     Acute kidney injury (H)      CKD (chronic kidney disease)      Coronary artery disease      Diabetes mellitus (H)      HTN (hypertension)      Hyperlipidemia      Ischemic cardiomyopathy      Paroxysmal atrial flutter (H)      Systolic heart failure (H)      Ventricular tachycardia (H)        FAMILY HISTORY:  Reviewed    SOCIAL HISTORY:  Social History     Social History     Marital status: Single     Spouse name: N/A     Number of children: N/A     Years of education: N/A     Social History Main Topics     Smoking status: Never Smoker     Smokeless tobacco: Never Used     Alcohol use Not on file     Drug use: Not on file     Sexual activity: Not on file     Other Topics Concern     Not on file     Social History Narrative       CURRENT MEDICATIONS:  Outpatient Medications Prior to Visit   Medication Sig Dispense Refill     amiodarone (PACERONE/CODARONE) 200 MG tablet Take 1 tablet (200 mg) by mouth daily 60 tablet 11     clopidogrel (PLAVIX) 75 MG tablet Take 75 mg by mouth daily       digoxin (LANOXIN) 125 MCG tablet  Take 1 tablet (125 mcg) by mouth daily 30 tablet 11     famotidine (PEPCID) 20 MG tablet Take 20 mg by mouth 2 times daily       hydrALAZINE (APRESOLINE) 50 MG tablet Take 1 tablet (50 mg) by mouth 3 times daily 120 tablet 11     insulin aspart (NOVOLOG PEN) 100 UNIT/ML injection Less than 120 mg/dL = 0 Units  120-149 mg/dL = 1 Unit  150-199 mg/dL = 2 Units  200-249 mg/dL = 3 Units  250-299 mg/dL = 5 Units  300-349 mg/dL = 7 Units  350 mg/dL or greater = 8 Units       insulin glargine (LANTUS SOLOSTAR) 100 UNIT/ML pen Inject 20 Units Subcutaneous every evening 3 mL 11     isosorbide mononitrate (IMDUR) 30 MG 24 hr tablet Take 1 tablet (30 mg) by mouth daily 30 tablet 11     metFORMIN (GLUCOPHAGE) 500 MG tablet Take 500 mg by mouth 2 times daily (with meals)       metoprolol succinate (TOPROL-XL) 25 MG 24 hr tablet Take 0.5 tablets (12.5 mg) by mouth 2 times daily 30 tablet 11     nitroGLYcerin (NITROSTAT) 0.4 MG sublingual tablet Place 0.4 mg under the tongue every 5 minutes as needed for chest pain For chest pain place 1 tablet under the tongue every 5 minutes for 3 doses. If symptoms persist 5 minutes after 1st dose call 911.       rosuvastatin (CRESTOR) 20 MG tablet Take 1 tablet (20 mg) by mouth At Bedtime 30 tablet 11     warfarin (COUMADIN) 2 MG tablet Take 4 mg by mouth daily On 7/1 and 7/2, then recheck INR at [facility] and take as directed       furosemide (LASIX) 20 MG tablet Take 0.5 tablets (10 mg) by mouth 2 times daily 30 tablet 3     No facility-administered medications prior to visit.        ROS:   CONSTITUTIONAL: Denies fever, chills, fatigue, or weight fluctuations.   HEENT: Denies headache and changes in speech. He complains of vision changes.   CV: Refer to HPI.   PULMONARY:Denies shortness of breath, cough, or previous TB exposure.   GI:Denies nausea, vomiting, diarrhea, and abdominal pain. Bowel movements are regular.   :See HPI  EXT:Denies lower extremity edema.  "  SKIN:Denies abnormal rashes or lesions.   MUSCULOSKELETAL:Denies upper or lower extremity weakness and pain.   NEUROLOGIC:Denies lightheadedness, dizziness, seizures, or upper or lower extremity paresthesia.     EXAM:  BP 91/59 (BP Location: Right arm, Patient Position: Chair, Cuff Size: Adult Regular)  Pulse 57  Ht 1.626 m (5' 4\")  Wt 78.5 kg (173 lb)  SpO2 98%  BMI 29.7 kg/m2  GENERAL: Appears alert and oriented times three.   HEENT: Normocephalic, atraumatic.  Mucous membranes moist and without lesions.  NECK: Supple and without lymphadenopathy. JVD 10-12cm.  CV: RRR, S1S2 present without murmur, rub, or gallop.   RESPIRATORY: Respirations regular, even, and unlabored. Lungs CTA throughout.   GI: Soft and non distended with normoactive bowel sounds present in all quadrants. No tenderness, rebound, guarding. No organomegaly.    EXTREMITIES: No peripheral edema. 2+ bilateral pedal pulses.   NEUROLOGIC: Alert and orientated x 3. CN II-XII grossly intact. No focal deficits.   MUSCULOSKELETAL: No joint swelling or tenderness.   SKIN: No jaundice. No rashes or lesions.     Labs:  Reviewed     Diagnostics:  Recent Results (from the past 4320 hour(s))   ECHO COMPLETE WITH OPTISON    Narrative    [id number]  [id number]  [id number]  [id number]^STEFANIA^MANNY^V           [facility]  Echocardiography Laboratory  [address] [place] [id number]     Name: ADILSON RINCON  MRN: [id number]  : 1960  Study Date: 2018 11:27 AM  Age: 57 yrs  Gender: Male  [place] [facility] [place] For Study: Cardiac Arrest  Ordering Physician: MANNY AGUIAR  Referring Physician: SELF, REFERRED  Performed By: Laya Read RDCS     BSA: 1.8 m2  Height: 64 in  Weight: 170 lb  HR: 78  BP: 99/76 mmHg  _____________________________________________________________________________  __        Procedure  Echocardiogram with two-dimensional, color and spectral Doppler " performed.  Contrast Optison. Optison (NDC #8894-8138-28) given intravenously. Patient was  given 5 ml mixture of 3 ml Optison and 6 ml saline. 4 ml wasted.  _____________________________________________________________________________  __        Interpretation Summary  Left ventricular wall thickness is normal. Borderline left ventricular  dilation is present. LVIDd is 5.19cm The Ejection Fraction is estimated at 20-  25%. The mid to distal anteroseptum, mid to distal anterior wall, mid to  distal anterolateral wall and mid to apical septum and apex are akinetic. This  is consistent with large LAD territory infarct. Laminar thrombus is seen in  the LV apex  The right ventricle is normal size. Difficult to evaluate RV function but it  appears to be mild to moderately reduced.  Moderate tricuspid insufficiency is present. Right ventricular systolic  pressure is 26mmHg above the right atrial pressure.  Dilation of the inferior vena cava is present with normal respiratory  variation in diameter. Estimated mean right atrial pressure is 8 mmHg (mildly  elevated).  No pericardial effusion is present.     Previous study not available for comparison.  _____________________________________________________________________________  __        Left Ventricle  Left ventricular wall thickness is normal. Borderline left ventricular  dilation is present. LVIDd is 5.19cm. Grade III or advanced diastolic  dysfunction. The Ejection Fraction is estimated at 20-25%. The mid to distal  anteroseptum, mid to distal anterior  wall, mid to distal anterolateral wall and mid to apical septum and apex are  akinetic. This is consistent with large LAD territory infarct. Laminar  thrombus is seen in the LV apex.     Right Ventricle  Right ventricular wall thickness is normal. The right ventricle is normal  size. Difficult to evaluate RV function but it appears to be mild to  moderately reduced. A pacemaker lead is noted in the right ventricle.      Atria  Moderate left atrial enlargement is present. Moderate right atrial enlargement  is present. A pacemaker lead is noted in the right atrium.     Mitral Valve  The mitral valve is normal. Trace mitral insufficiency is present.        Aortic Valve  Aortic valve is normal in structure and function. The aortic valve is  tricuspid.     Tricuspid Valve  The tricuspid valve is normal. Moderate tricuspid insufficiency is present.  The right ventricular systolic pressure is approximated at 26.0 mmHg plus the  right atrial pressure. Right ventricular systolic pressure is 26mmHg above the  right atrial pressure.     Pulmonic Valve  The pulmonic valve is normal. Trace pulmonic insufficiency is present.     Vessels  The aorta root is normal. Dilation of the inferior vena cava is present with  normal respiratory variation in diameter. Estimated mean right atrial pressure  is 8 mmHg (mildly elevated).     Pericardium  No pericardial effusion is present.        Compared to Previous Study  Previous study not available for comparison.  _____________________________________________________________________________  __  MMode/2D Measurements & Calculations  IVSd: 0.74 cm     LVIDd: 5.2 cm  LVIDs: 4.0 cm  LVPWd: 1.0 cm  FS: 23.3 %  LV mass(C)d: 167.6 grams  LV mass(C)dI: 91.8 grams/m2  asc Aorta Diam: 3.6 cm  LVOT diam: 2.1 cm  LVOT area: 3.5 cm2  LA Volume (BP): 81.1 ml  LA Volume Index (BP): 44.3 ml/m2  RWT: 0.40           Doppler Measurements & Calculations  MV E max sasha: 101.0 cm/sec  MV A max sasha: 16.5 cm/sec  MV E/A: 6.1  MV dec slope: 660.0 cm/sec2  PA V2 max: 96.5 cm/sec  PA max PG: 3.7 mmHg  PA acc time: 0.11 sec  TR max sasha: 255.0 cm/sec  TR max P.0 mmHg  E/E' av.8  Lateral E/e': 11.0  Medial E/e': 34.5     _____________________________________________________________________________  __           Report approved by: LEÓN Powell 2018 02:05 PM                   Assessment and Plan: 57 year old male with  a past medical history including CAD (s/p STEMI with ALYSSA to LAD 6/27/18), 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    Chronic systolic heart failure secondary to ICM.    Stage C, NYHA Class IIIb  ACEi/ARB: no given CRI; Will continue Hydralazine 50 mg po TID. Defer increase occasional symptomatic episodes c/w hypotension  BB Toprol XL 12.5 mg po BID.  Defer increase occasional symptomatic episodes c/w hypotension  Aldosterone antagonist contraindicated due to renal dysfunction  SCD prophylaxis ICD.  Fluid status hypervolemic.increase Lasix to 20 mg po BID.   Discussed with patient overall heart failure status and option of proceeding with LVAD placement (LVAD evaluation completed during last hospitalization).  Pt now ready to proceed with LVAD if needed.  Will plan for RHC prior to next visit.  Will continue to complete LVAD eval - colonoscopy and dental.    CAD. s/p STEMI with ALYSSA to LAD 6/27/18  - Continue Crestor, Plavix, and Toprol XL.     PAF. CHADSVASC-4.   - Coumadin per AC.   - Continue Toprol XL.     VT/VF. With cardiac arrest.   - No recent ICD shocks  - Last device interrogation with no evidence of arrhythmias since prior shocks7/16/18.      LV thrombus.   - Coumadin as above.     CKD Stage III.   - Grossly stable    Follow-up:  Labs next week.  RTC to see me on 11/13/18 with repeat RHC prior.       Jenni Ortiz MD  Section Head - Advanced Heart Failure, Transplantation and Mechanical Circulatory Support  Co-Director - Adult Congenital and Cardiovascular Genetics Center  Associate Professor of Medicine, [facility]

## 2018-10-23 ENCOUNTER — TELEPHONE (OUTPATIENT)
Dept: CARDIOLOGY | Facility: CLINIC | Age: 58
End: 2018-10-23

## 2018-10-23 NOTE — TELEPHONE ENCOUNTER
[facility] Call Center    Phone Message    May a detailed message be left on voicemail: yes    Reason for Call: Other: Pt wants to push back getting his LVAD until after [place]. Please give him a call back to discuss.      Action Taken: Message routed to:  [facility] ([facility]): Cardiology

## 2018-10-24 NOTE — TELEPHONE ENCOUNTER
Called patient back; He is under the impression that he was going to be admitted for VAD implant the next time he is here (11/13). He is wondering if it can be done after [place] if it is safe for him to wait till then. He will go by Dr. Ortiz's recommendations.  Message sent to Dr. Ortiz, will get back to patient.

## 2018-10-25 ENCOUNTER — TELEPHONE (OUTPATIENT)
Dept: GASTROENTEROLOGY | Facility: CLINIC | Age: 58
End: 2018-10-25

## 2018-10-25 DIAGNOSIS — Z12.11 SPECIAL SCREENING FOR MALIGNANT NEOPLASMS, COLON: Primary | ICD-10-CM

## 2018-10-27 NOTE — TELEPHONE ENCOUNTER
Per Dr cedillo as long as his renal function stays relatively stable it is ok to wait until after [place]. She recommends that patient gets colonoscopy completed here given his poor cardiac status, in mid-November.     VAD team notified to schedule colonoscopy, and look over evaluation to complete any missing tests.   Also offered him to meet with the LVAD coordinators and social work to review the LVAD which he'd like to do. Message sent to both SW and VAD team to schedule meetings.    Mariusz verbalized understanding and is agreeable with plan

## 2018-11-01 ENCOUNTER — TELEPHONE (OUTPATIENT)
Dept: GASTROENTEROLOGY | Facility: CLINIC | Age: 58
End: 2018-11-01

## 2018-11-08 RX ORDER — LIDOCAINE 40 MG/G
CREAM TOPICAL
Status: CANCELLED | OUTPATIENT
Start: 2018-11-08

## 2018-11-09 ENCOUNTER — TRANSFERRED RECORDS (OUTPATIENT)
Dept: HEALTH INFORMATION MANAGEMENT | Facility: CLINIC | Age: 58
End: 2018-11-09

## 2018-11-13 ENCOUNTER — OFFICE VISIT (OUTPATIENT)
Dept: CARDIOLOGY | Facility: CLINIC | Age: 58
End: 2018-11-13
Attending: INTERNAL MEDICINE
Payer: MEDICAID

## 2018-11-13 ENCOUNTER — APPOINTMENT (OUTPATIENT)
Dept: MEDSURG UNIT | Facility: CLINIC | Age: 58
End: 2018-11-13
Attending: INTERNAL MEDICINE
Payer: MEDICAID

## 2018-11-13 ENCOUNTER — ALLIED HEALTH/NURSE VISIT (OUTPATIENT)
Dept: TRANSPLANT | Facility: CLINIC | Age: 58
End: 2018-11-13
Attending: INTERNAL MEDICINE
Payer: MEDICAID

## 2018-11-13 ENCOUNTER — HOSPITAL ENCOUNTER (OUTPATIENT)
Facility: CLINIC | Age: 58
Discharge: HOME | End: 2018-11-13
Attending: INTERNAL MEDICINE | Admitting: INTERNAL MEDICINE
Payer: MEDICAID

## 2018-11-13 VITALS
HEIGHT: 64 IN | BODY MASS INDEX: 29.88 KG/M2 | WEIGHT: 175.04 LBS | SYSTOLIC BLOOD PRESSURE: 143 MMHG | TEMPERATURE: 97.1 F | DIASTOLIC BLOOD PRESSURE: 97 MMHG | RESPIRATION RATE: 18 BRPM | OXYGEN SATURATION: 97 %

## 2018-11-13 VITALS
HEART RATE: 61 BPM | SYSTOLIC BLOOD PRESSURE: 128 MMHG | HEIGHT: 64 IN | WEIGHT: 175 LBS | OXYGEN SATURATION: 98 % | DIASTOLIC BLOOD PRESSURE: 88 MMHG | BODY MASS INDEX: 29.88 KG/M2

## 2018-11-13 DIAGNOSIS — I50.22 CHRONIC SYSTOLIC HEART FAILURE (H): ICD-10-CM

## 2018-11-13 DIAGNOSIS — Z76.82 HEART TRANSPLANT CANDIDATE: Primary | ICD-10-CM

## 2018-11-13 LAB
ANION GAP SERPL CALCULATED.3IONS-SCNC: 8 MMOL/L (ref 3–14)
B-HCG FREE SERPL-ACNC: 1.5 [IU]/L (ref 0.86–1.14)
BUN SERPL-MCNC: 36 MG/DL (ref 7–30)
CALCIUM SERPL-MCNC: 8.9 MG/DL (ref 8.5–10.1)
CHLORIDE SERPL-SCNC: 102 MMOL/L (ref 94–109)
CO2 SERPL-SCNC: 25 MMOL/L (ref 20–32)
CREAT SERPL-MCNC: 1.59 MG/DL (ref 0.66–1.25)
GFR SERPL CREATININE-BSD FRML MDRD: 45 ML/MIN/1.7M2
GLUCOSE BLDC GLUCOMTR-MCNC: 157 MG/DL (ref 70–99)
GLUCOSE BLDC GLUCOMTR-MCNC: 176 MG/DL (ref 70–99)
GLUCOSE SERPL-MCNC: 367 MG/DL (ref 70–99)
POTASSIUM SERPL-SCNC: 4.1 MMOL/L (ref 3.4–5.3)
SODIUM SERPL-SCNC: 134 MMOL/L (ref 133–144)

## 2018-11-13 PROCEDURE — 93451 RIGHT HEART CATH: CPT

## 2018-11-13 PROCEDURE — 25000125 ZZHC RX 250: Performed by: INTERNAL MEDICINE

## 2018-11-13 PROCEDURE — 93451 RIGHT HEART CATH: CPT | Mod: 26 | Performed by: INTERNAL MEDICINE

## 2018-11-13 PROCEDURE — 40000166 ZZH STATISTIC PP CARE STAGE 1

## 2018-11-13 PROCEDURE — 27210787 ZZH MANIFOLD CR2

## 2018-11-13 PROCEDURE — 27211181 ZZH BALLOON TIP PRESSURE CR5

## 2018-11-13 PROCEDURE — 99214 OFFICE O/P EST MOD 30 MIN: CPT | Mod: ZP | Performed by: INTERNAL MEDICINE

## 2018-11-13 PROCEDURE — 36415 COLL VENOUS BLD VENIPUNCTURE: CPT | Performed by: INTERNAL MEDICINE

## 2018-11-13 PROCEDURE — 27210982 ZZH KIT RT HC TOTES DISP CR7

## 2018-11-13 PROCEDURE — 85610 PROTHROMBIN TIME: CPT

## 2018-11-13 PROCEDURE — 82962 GLUCOSE BLOOD TEST: CPT

## 2018-11-13 PROCEDURE — 80048 BASIC METABOLIC PNL TOTAL CA: CPT | Performed by: INTERNAL MEDICINE

## 2018-11-13 RX ORDER — LIDOCAINE 40 MG/G
CREAM TOPICAL
Status: DISCONTINUED | OUTPATIENT
Start: 2018-11-13 | End: 2018-11-13 | Stop reason: HOSPADM

## 2018-11-13 RX ORDER — LIDOCAINE 40 MG/G
CREAM TOPICAL
Status: COMPLETED | OUTPATIENT
Start: 2018-11-13 | End: 2018-11-13

## 2018-11-13 RX ADMIN — LIDOCAINE: 40 CREAM TOPICAL at 14:15

## 2018-11-13 ASSESSMENT — PAIN SCALES - GENERAL: PAINLEVEL: NO PAIN (0)

## 2018-11-13 NOTE — IP AVS SNAPSHOT
[address] [address]                                       After Visit Summary   11/13/2018    Mariusz Sharp    MRN: [id number]           After Visit Summary Signature Page     I have received my discharge instructions, and my questions have been answered. I have discussed any challenges I see with this plan with the nurse or doctor.    ..........................................................................................................................................  Patient/Patient Representative Signature      ..........................................................................................................................................  Patient Representative Print Name and Relationship to Patient    ..................................................               ................................................  Date                                   Time    ..........................................................................................................................................  Reviewed by Signature/Title    ...................................................              ..............................................  Date                                               Time          22EPIC Rev 08/18

## 2018-11-13 NOTE — MR AVS SNAPSHOT
After Visit Summary   11/13/2018    Mariusz Sharp    MRN: [id number]           Patient Information     Date Of Birth          1960        Visit Information        Provider Department      11/13/2018 11:30 AM Jenni Ortiz MD [facility]        Today's Diagnoses     Chronic systolic heart failure (H)           Follow-ups after your visit        Your next 10 appointments already scheduled     Nov 13, 2018  1:30 PM CST   Procedure - 2.5 hour with U2A ROOM 7   [address] [facility] ([facility])    [address] [facility] [address]               Nov 13, 2018  2:30 PM CST   Heart Cath Right Heart Cath with [id number]   [facility],  Heart Cath Lab ([facility])    [address] [address] [phone]            Dec 12, 2018   Procedure with Kenton Jain MD   [facility] [place], Endoscopy ([facility])    [address] [address]   [phone]           The [facility] is located on the corner of [address] and [address] on the [facility]. It is easily accessible from virtually any point in the [facility] area, via [address] and [address].              Who to contact     If you have questions or need follow up information about today's clinic visit or your schedule please contact [facility] directly at [phone].  Normal or non-critical lab and imaging results will be communicated to you by MyChart, letter or phone within 4 business days after the clinic has received the results. If you do not hear from us within 7 days, please contact the clinic through MyChart or phone. If you have a critical or abnormal lab result, we will notify you by phone as soon as possible.  Submit refill requests through [employer] or call your pharmacy and they will forward the refill request to  "us. Please allow 3 business days for your refill to be completed.          Additional Information About Your Visit        Care EveryWhere ID     This is your Care EveryWhere ID. This could be used by other organizations to access your [place] medical records  [id number]        Your Vitals Were     Pulse Height Pulse Oximetry BMI (Body Mass Index)          61 1.626 m (5' 4\") 98% 30.04 kg/m2         Blood Pressure from Last 3 Encounters:   11/13/18 128/88   10/17/18 91/59   10/03/18 100/65    Weight from Last 3 Encounters:   11/13/18 79.4 kg (175 lb)   10/17/18 78.5 kg (173 lb)   10/03/18 75.6 kg (166 lb 11.2 oz)              We Performed the Following     Follow-Up with Advanced Heart Failure Clinic        Primary Care Provider Office Phone # Fax #    [facility] [phone]       [address] [place] [address]        Equal Access to Services     GILLIAN COX : Hadii aad ku hadasho Soomaali, waaxda luqadaha, qaybta kaalmada adeegyada, mynor bar . So [facility] [phone].    ATENCIÓN: Si raquella esperika, tiene a waddell disposición servicios gratuitos de asistencia lingüística. Llame al [phone].    We comply with applicable federal civil rights laws and [place] laws. We do not discriminate on the basis of race, color, national origin, age, disability, sex, sexual orientation, or gender identity.            Thank you!     Thank you for choosing [facility]  for your care. Our goal is always to provide you with excellent care. Hearing back from our patients is one way we can continue to improve our services. Please take a few minutes to complete the written survey that you may receive in the mail after your visit with us. Thank you!             Your Updated Medication List - Protect others around you: Learn how to safely use, store and throw away your medicines at www.disposemymeds.org.          This list is accurate as of 11/13/18 12:15 PM.  " Always use your most recent med list.                   Brand Name Dispense Instructions for use Diagnosis    amiodarone 200 MG tablet    PACERONE/CODARONE    60 tablet    Take 1 tablet (200 mg) by mouth daily    Acute systolic heart failure (H)       clopidogrel 75 MG tablet    PLAVIX     Take 75 mg by mouth daily        digoxin 125 MCG tablet    LANOXIN    30 tablet    Take 1 tablet (125 mcg) by mouth daily    Acute systolic heart failure (H)       famotidine 20 MG tablet    PEPCID     Take 20 mg by mouth 2 times daily        furosemide 20 MG tablet    LASIX    180 tablet    Take 1 tablet (20 mg) by mouth 2 times daily    Chronic systolic heart failure (H)       hydrALAZINE 50 MG tablet    APRESOLINE    120 tablet    Take 1 tablet (50 mg) by mouth 3 times daily    Acute systolic heart failure (H)       insulin aspart 100 UNIT/ML injection    NovoLOG PEN     Less than 120 mg/dL = 0 Units 120-149 mg/dL = 1 Unit 150-199 mg/dL = 2 Units 200-249 mg/dL = 3 Units 250-299 mg/dL = 5 Units 300-349 mg/dL = 7 Units 350 mg/dL or greater = 8 Units        insulin glargine 100 UNIT/ML injection    LANTUS    3 mL    Inject 20 Units Subcutaneous every evening    Type 2 diabetes mellitus with hyperglycemia, with long-term current use of insulin (H)       isosorbide mononitrate 30 MG 24 hr tablet    IMDUR    30 tablet    Take 1 tablet (30 mg) by mouth daily    Acute systolic heart failure (H)       metFORMIN 500 MG tablet    GLUCOPHAGE     Take 500 mg by mouth 2 times daily (with meals)        metoprolol succinate 25 MG 24 hr tablet    TOPROL-XL    30 tablet    Take 0.5 tablets (12.5 mg) by mouth 2 times daily        NITROSTAT 0.4 MG sublingual tablet   Generic drug:  nitroGLYcerin      Place 0.4 mg under the tongue every 5 minutes as needed for chest pain For chest pain place 1 tablet under the tongue every 5 minutes for 3 doses. If symptoms persist 5 minutes after 1st dose call 911.        rosuvastatin 20 MG tablet    CRESTOR    30  tablet    Take 1 tablet (20 mg) by mouth At Bedtime    Acute systolic heart failure (H)       warfarin 2 MG tablet    COUMADIN     Take 4 mg by mouth daily On 7/1 and 7/2, then recheck INR at [facility] [place] and take as directed

## 2018-11-13 NOTE — PROCEDURES
FINAL CARDIAC CATHETERIZATION REPORT    PROCEDURES PERFORMED:   Right Heart Catheterization    PHYSICIANS:  Attending Physician: Momo Hunt MD  Interventional Cardiology Fellow: None  Cardiology Fellow: Carlin Enriquez MD    INDICATION:  Mariusz Sharp is a 58 year old male with NICM EF 20-25% who presents today at request of Dr. Jenni Ortzi for a right heart catheterization/hemodynamic assessment.    DESCRIPTION:  1. Consent obtained with discussion of risks.  All questions were answered.  2. Sterile prep and procedure.  3. Location with Sheaths:   Rt IJ  7 Fr 10 cm [short]  4. Access: Local anesthetic with lidocaine.  A standard 18 guage needle with ultrasound guidance was used to establish vascular access using a modified Seldinger technique.  5. Diagnostic Catheters:  7 Fr  [place] Calvin  6. Guiding Catheters:  None  6. Estimated blood loss: < 5 ml    MEDICATIONS:    Heart rate, BP, respiration, oxygen saturation and patient responses were monitored throughout the procedure with the assistance of the RN under my supervision.    Procedures:    HEMODYNAMICS:  BSA 1.9 m2  1. HR 61 bpm  2. /98/117 mmHg  3. RA 21/18/17   4. RV 48/18  5. PA 48/31/37   6. PCW 28/30/28   7. PA sat 51.3%   8. PCW sat not obtained  9. Hgb 11.1 g/dL   10. William CO 3.0   11. William CI 1.6   12. TD CO 3.5   13. TD CI 1.9   14. PVR 3.0  15. SVR 2285 using TD     Sheath Removal:  RIJ sheath removed in lab    Contrast: Isovue, 0 ml     Fluoroscopy Time: 1.9 min    COMPLICATIONS:  1. None    SUMMARY:   >> Elevated biventricular filling pressures with moderate pulmonary hypertension and low cardiac output/index of 3.0/1.6 as assessed by William and 3.5/1.9 as assessed by Thermodilution.    PLAN:   >> Discharge today per protocol, follow up with Dr. Ortiz and heart failure team as planned.    The attending interventional cardiologist was present and supervised all critical aspects the procedure.    Findings discussed with Dr. Ortiz who will contact  patient for outpatient diuretic adjustment.    See CVIS report for final draft.    Carlin Enriquez MD   Cardiology Fellow      Staff Cardiologist: I supervised the cardiology fellow and reviewed the hemodynamic findings with the fellow at the completion of the procedure.  I agree with the documentation above.    Momo Hunt MD

## 2018-11-13 NOTE — LETTER
11/13/2018      RE: Mariusz Sharp  [address] [facility] [address]       Dear Colleague,    Thank you for the opportunity to participate in the care of your patient, Mariusz Sharp, at the [facility]. Please see a copy of my visit note below.        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 He denies any further episode of exertional N/V.  He denies fever, chills, chest pain, palpitations, SOB, change in RADER, PND, orthopnea, vomiting, diarrhea, hematochezia, melena, or LE edema however admits that his exercise capacity and overall energy have declined and his quality of life as deteriorated to point that he is ready to proceed with LVAD placement as soon as evaluation completed.      PAST MEDICAL HISTORY:  Past Medical History:   Diagnosis Date     Acute kidney injury (H)      CKD (chronic kidney disease)      Coronary artery disease      Diabetes mellitus (H)      HTN (hypertension)      Hyperlipidemia      Ischemic cardiomyopathy      Paroxysmal atrial flutter (H)      Systolic heart failure (H)      Ventricular tachycardia (H)        FAMILY HISTORY:  Reviewed    SOCIAL HISTORY:  Social History     Social History     Marital status: Single     Spouse name: N/A     Number of children: N/A     Years of education: N/A     Social History Main Topics     Smoking status: Never Smoker     Smokeless tobacco: Never Used     Alcohol use Not on file     Drug use: Not on file     Sexual activity: Not on file     Other Topics Concern     Not on file     Social History Narrative       CURRENT MEDICATIONS:  Outpatient Medications Prior to Visit   Medication Sig Dispense Refill     amiodarone (PACERONE/CODARONE) 200 MG tablet Take 1 tablet (200  mg) by mouth daily 60 tablet 11     clopidogrel (PLAVIX) 75 MG tablet Take 75 mg by mouth daily       digoxin (LANOXIN) 125 MCG tablet Take 1 tablet (125 mcg) by mouth daily 30 tablet 11     famotidine (PEPCID) 20 MG tablet Take 20 mg by mouth 2 times daily       hydrALAZINE (APRESOLINE) 50 MG tablet Take 1 tablet (50 mg) by mouth 3 times daily 120 tablet 11     insulin aspart (NOVOLOG PEN) 100 UNIT/ML injection Less than 120 mg/dL = 0 Units  120-149 mg/dL = 1 Unit  150-199 mg/dL = 2 Units  200-249 mg/dL = 3 Units  250-299 mg/dL = 5 Units  300-349 mg/dL = 7 Units  350 mg/dL or greater = 8 Units       insulin glargine (LANTUS SOLOSTAR) 100 UNIT/ML pen Inject 20 Units Subcutaneous every evening 3 mL 11     isosorbide mononitrate (IMDUR) 30 MG 24 hr tablet Take 1 tablet (30 mg) by mouth daily 30 tablet 11     metFORMIN (GLUCOPHAGE) 500 MG tablet Take 500 mg by mouth 2 times daily (with meals)       metoprolol succinate (TOPROL-XL) 25 MG 24 hr tablet Take 0.5 tablets (12.5 mg) by mouth 2 times daily 30 tablet 11     nitroGLYcerin (NITROSTAT) 0.4 MG sublingual tablet Place 0.4 mg under the tongue every 5 minutes as needed for chest pain For chest pain place 1 tablet under the tongue every 5 minutes for 3 doses. If symptoms persist 5 minutes after 1st dose call 911.       rosuvastatin (CRESTOR) 20 MG tablet Take 1 tablet (20 mg) by mouth At Bedtime 30 tablet 11     warfarin (COUMADIN) 2 MG tablet Take 4 mg by mouth daily On 7/1 and 7/2, then recheck INR at [facility] and take as directed       furosemide (LASIX) 20 MG tablet Take 1 tablet (20 mg) by mouth 2 times daily 180 tablet 3     No facility-administered medications prior to visit.        ROS:   CONSTITUTIONAL: Denies fever, chills, fatigue, or weight fluctuations.   HEENT: Denies headache and changes in speech. He complains of vision changes.   CV: Refer to HPI.   PULMONARY:Denies shortness of breath, cough, or previous TB exposure.  "  GI:Denies nausea, vomiting, diarrhea, and abdominal pain. Bowel movements are regular.   :See HPI  EXT:Denies lower extremity edema.   SKIN:Denies abnormal rashes or lesions.   MUSCULOSKELETAL:Denies upper or lower extremity weakness and pain.   NEUROLOGIC:Denies lightheadedness, dizziness, seizures, or upper or lower extremity paresthesia.     EXAM:  /88 (BP Location: Left arm, Patient Position: Chair, Cuff Size: Adult Regular)   Pulse 61   Ht 1.626 m (5' 4\")   Wt 79.4 kg (175 lb)   SpO2 98%   BMI 30.04 kg/m     GENERAL: Appears alert and oriented times three.   HEENT: Normocephalic, atraumatic.  Mucous membranes moist and without lesions.  NECK: Supple and without lymphadenopathy. JVD ~10cm.  CV: RRR, S1S2 present without murmur, rub, or gallop.   RESPIRATORY: Respirations regular, even, and unlabored. Lungs CTA throughout.   GI: Soft and non distended with normoactive bowel sounds present in all quadrants. No tenderness, rebound, guarding. No organomegaly.    EXTREMITIES: No peripheral edema. 2+ bilateral pedal pulses.   NEUROLOGIC: Alert and orientated x 3. CN II-XII grossly intact. No focal deficits.   MUSCULOSKELETAL: No joint swelling or tenderness.   SKIN: No jaundice. No rashes or lesions.     Labs:  Reviewed     Diagnostics:  Recent Results (from the past 4320 hour(s))   ECHO COMPLETE WITH OPTISON    Narrative    [id number]  [id number]  [id number]  [id number]^STEFANIA^MANNY^V           [facility],English  Echocardiography Laboratory  [address] [place] [id number]     Name: ADILSON RINCON  MRN: [id number]  : 1960  Study Date: 2018 11:27 AM  Age: 57 yrs  Gender: Male  Patient Location: [facility]  Reason For Study: Cardiac Arrest  Ordering Physician: MANNY AGUIAR  Referring Physician: SELF, REFERRED  Performed By: Laya Read RDCS     BSA: 1.8 m2  Height: 64 in  Weight: 170 lb  HR: 78  BP: 99/76 " mmHg  _____________________________________________________________________________  __        Procedure  Echocardiogram with two-dimensional, color and spectral Doppler performed.  Contrast Optison. Optison (NDC #5923-1888-94) given intravenously. Patient was  given 5 ml mixture of 3 ml Optison and 6 ml saline. 4 ml wasted.  _____________________________________________________________________________  __        Interpretation Summary  Left ventricular wall thickness is normal. Borderline left ventricular  dilation is present. LVIDd is 5.19cm The Ejection Fraction is estimated at 20-  25%. The mid to distal anteroseptum, mid to distal anterior wall, mid to  distal anterolateral wall and mid to apical septum and apex are akinetic. This  is consistent with large LAD territory infarct. Laminar thrombus is seen in  the LV apex  The right ventricle is normal size. Difficult to evaluate RV function but it  appears to be mild to moderately reduced.  Moderate tricuspid insufficiency is present. Right ventricular systolic  pressure is 26mmHg above the right atrial pressure.  Dilation of the inferior vena cava is present with normal respiratory  variation in diameter. Estimated mean right atrial pressure is 8 mmHg (mildly  elevated).  No pericardial effusion is present.     Previous study not available for comparison.  _____________________________________________________________________________  __        Left Ventricle  Left ventricular wall thickness is normal. Borderline left ventricular  dilation is present. LVIDd is 5.19cm. Grade III or advanced diastolic  dysfunction. The Ejection Fraction is estimated at 20-25%. The mid to distal  anteroseptum, mid to distal anterior  wall, mid to distal anterolateral wall and mid to apical septum and apex are  akinetic. This is consistent with large LAD territory infarct. Laminar  thrombus is seen in the LV apex.     Right Ventricle  Right ventricular wall thickness is normal. The  right ventricle is normal  size. Difficult to evaluate RV function but it appears to be mild to  moderately reduced. A pacemaker lead is noted in the right ventricle.     Atria  Moderate left atrial enlargement is present. Moderate right atrial enlargement  is present. A pacemaker lead is noted in the right atrium.     Mitral Valve  The mitral valve is normal. Trace mitral insufficiency is present.        Aortic Valve  Aortic valve is normal in structure and function. The aortic valve is  tricuspid.     Tricuspid Valve  The tricuspid valve is normal. Moderate tricuspid insufficiency is present.  The right ventricular systolic pressure is approximated at 26.0 mmHg plus the  right atrial pressure. Right ventricular systolic pressure is 26mmHg above the  right atrial pressure.     Pulmonic Valve  The pulmonic valve is normal. Trace pulmonic insufficiency is present.     Vessels  The aorta root is normal. Dilation of the inferior vena cava is present with  normal respiratory variation in diameter. Estimated mean right atrial pressure  is 8 mmHg (mildly elevated).     Pericardium  No pericardial effusion is present.        Compared to Previous Study  Previous study not available for comparison.  _____________________________________________________________________________  __  MMode/2D Measurements & Calculations  IVSd: 0.74 cm     LVIDd: 5.2 cm  LVIDs: 4.0 cm  LVPWd: 1.0 cm  FS: 23.3 %  LV mass(C)d: 167.6 grams  LV mass(C)dI: 91.8 grams/m2  asc Aorta Diam: 3.6 cm  LVOT diam: 2.1 cm  LVOT area: 3.5 cm2  LA Volume (BP): 81.1 ml  LA Volume Index (BP): 44.3 ml/m2  RWT: 0.40           Doppler Measurements & Calculations  MV E max sasha: 101.0 cm/sec  MV A max sasha: 16.5 cm/sec  MV E/A: 6.1  MV dec slope: 660.0 cm/sec2  PA V2 max: 96.5 cm/sec  PA max PG: 3.7 mmHg  PA acc time: 0.11 sec  TR max sasha: 255.0 cm/sec  TR max P.0 mmHg  E/E' av.8  Lateral E/e': 11.0  Medial E/e': 34.5      _____________________________________________________________________________  __           Report approved by: LEÓN Powell 07/22/2018 02:05 PM                           Assessment and Plan: 58 year old male with a past medical history including CAD (s/p STEMI with ALYSSA to LAD 6/27/18), 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    Chronic systolic heart failure secondary to ICM.    Stage C, NYHA Class IV  ACEi/ARB: no given CRI; Will continue Hydralazine 50 mg po TID. Defer increase occasional symptomatic episodes c/w hypotension  BB Toprol XL 12.5 mg po BID.  Defer increase occasional symptomatic episodes c/w hypotension as well as low cardiac output  Aldosterone antagonist contraindicated due to renal dysfunction  SCD prophylaxis ICD.   Pt ready to proceed with LVAD as soon as able.  RHC today confirms los cardiac output and elevated PCWP.  Will continue to complete LVAD eval - colonoscopy and dental ASAP.    CAD. s/p STEMI with ALYSSA to LAD 6/27/18  - Continue Crestor, Plavix, and Toprol XL.     PAF. CHADSVASC-4.   - Coumadin per AC.   - Continue Toprol XL.     VT/VF. With cardiac arrest.   - No recent ICD shocks  - Last device interrogation with no evidence of arrhythmias since prior shocks7/16/18.      LV thrombus.   - Coumadin as above.     CKD Stage III.   - Grossly stable    Follow-up:  Once LVAD evaluation completed will proceed with LVAD placement.        Jenni Ortiz MD

## 2018-11-13 NOTE — PROGRESS NOTES
Pt arrives to 2a, alone, for RHC. Consent is signed. ISTAT INR 1.5  Discharge instructions reviewed with pt pre procedure, pt verbalizes understanding.

## 2018-11-13 NOTE — DISCHARGE INSTRUCTIONS
[facility]                        Interventional Cardiology  Discharge Instructions   Post Right Heart Cath      AFTER YOU GO HOME:    DO drink plenty of fluids    DO resume your regular diet and medications unless otherwise instructed by your Primary Physician    Do Not scrub the procedure site vigorously    No lotion or powder to the puncture site for 3 days    CALL YOUR PRIMARY PHYSICIAN IF: You may resume all normal activity.  Monitor neck site for bleeding, swelling, or voice changes. If you notice bleeding or swelling immediately apply pressure to the site and call number below to speak with Cardiology Fellow.  If you experience any changes in your breathing you should call your doctor immediately or come to the closest Emergency Department.  Do not drive yourself.    ADDITIONAL INSTRUCTIONS: Medications: You are to resume all home medications including anticoagulation therapy unless otherwise advised by your primary cardiologist or nurse coordinator.    Follow Up: Per your primary cardiology team    If you have any questions or concerns regarding your procedure site please call [phone] at anytime and ask for Cardiology Fellow on call.  They are available 24 hours a day.  You may also contact the Cardiology Clinic after hours number at [phone].                                                       Telephone Numbers [phone] Monday-Friday 8:00 am to 4:30 pm    [phone] After 4:30 pm Monday-Friday, Weekends & Holidays  Ask for Interventional Cardiologist on call. Someone is on call 24 hours/day   [facility] toll free number [phone] Monday-Friday 8:00 am to 4:30 pm   [facility] Emergency Dept [phone]

## 2018-11-13 NOTE — MR AVS SNAPSHOT
After Visit Summary   11/13/2018    Mariusz Sharp    MRN: [id number]           Patient Information     Date Of Birth          1960        Visit Information        Provider Department      11/13/2018 10:00 AM Christine Rainey MSW [facility] Solid Organ Transplant        Today's Diagnoses     Heart transplant candidate    -  1       Follow-ups after your visit        Your next 10 appointments already scheduled     Dec 12, 2018   Procedure with Kenton Jain MD   [facility], [place], [facility] ([facility], [facility])    [address] [id number] [phone]           [facility] is located on the corner of [address] and [address] on the [facility]. It is easily accessible from virtually any point in the [facility] area, via [employer] and [email or web address].              Who to contact     If you have questions or need follow up information about today's clinic visit or your schedule please contact [facility] directly at [phone].  Normal or non-critical lab and imaging results will be communicated to you by MyChart, letter or phone within 4 business days after the clinic has received the results. If you do not hear from us within 7 days, please contact the clinic through [employer] or phone. If you have a critical or abnormal lab result, we will notify you by phone as soon as possible.  Submit refill requests through [employer] or call your pharmacy and they will forward the refill request to us. Please allow 3 business days for your refill to be completed.          Additional Information About Your Visit        Care EveryWhere ID     This is your Care EveryWhere ID. This could be used by other organizations to access your [place] medical records  [id number]         Blood Pressure from Last 3 Encounters:   11/13/18 119/89   11/13/18 128/88   10/17/18 91/59    Weight from Last 3 Encounters:    11/13/18 79.4 kg (175 lb 0.7 oz)   11/13/18 79.4 kg (175 lb)   10/17/18 78.5 kg (173 lb)              Today, you had the following     No orders found for display       Primary Care Provider Office Phone # Fax #    Estephania [facility] [phone]       [address] [place] [address]        Equal Access to Services     GILLIAN COX : Hadii aad ku hadasho Soomaali, waaxda luqadaha, qaybta kaalmada adeegyada, waxay idiin hayaan adeeg kharash la'aan ah. So [facility] [phone].    ATENCIÓN: Si habla esperika, tiene a waddell disposición servicios gratuitos de asistencia lingüística. Llame al [phone].    We comply with applicable federal civil rights laws and [place] laws. We do not discriminate on the basis of race, color, national origin, age, disability, sex, sexual orientation, or gender identity.            Thank you!     Thank you for choosing [facility]  for your care. Our goal is always to provide you with excellent care. Hearing back from our patients is one way we can continue to improve our services. Please take a few minutes to complete the written survey that you may receive in the mail after your visit with us. Thank you!             Your Updated Medication List - Protect others around you: Learn how to safely use, store and throw away your medicines at www.disposemymeds.org.          This list is accurate as of 11/13/18  1:31 PM.  Always use your most recent med list.                   Brand Name Dispense Instructions for use Diagnosis    amiodarone 200 MG tablet    PACERONE/CODARONE    60 tablet    Take 1 tablet (200 mg) by mouth daily    Acute systolic heart failure (H)       clopidogrel 75 MG tablet    PLAVIX     Take 75 mg by mouth daily        digoxin 125 MCG tablet    LANOXIN    30 tablet    Take 1 tablet (125 mcg) by mouth daily    Acute systolic heart failure (H)       famotidine 20 MG tablet    PEPCID     Take 20 mg by mouth 2 times daily         furosemide 20 MG tablet    LASIX    180 tablet    Take 1 tablet (20 mg) by mouth 2 times daily    Chronic systolic heart failure (H)       hydrALAZINE 50 MG tablet    APRESOLINE    120 tablet    Take 1 tablet (50 mg) by mouth 3 times daily    Acute systolic heart failure (H)       insulin aspart 100 UNIT/ML injection    NovoLOG PEN     Less than 120 mg/dL = 0 Units 120-149 mg/dL = 1 Unit 150-199 mg/dL = 2 Units 200-249 mg/dL = 3 Units 250-299 mg/dL = 5 Units 300-349 mg/dL = 7 Units 350 mg/dL or greater = 8 Units        insulin glargine 100 UNIT/ML injection    LANTUS    3 mL    Inject 20 Units Subcutaneous every evening    Type 2 diabetes mellitus with hyperglycemia, with long-term current use of insulin (H)       isosorbide mononitrate 30 MG 24 hr tablet    IMDUR    30 tablet    Take 1 tablet (30 mg) by mouth daily    Acute systolic heart failure (H)       metFORMIN 500 MG tablet    GLUCOPHAGE     Take 500 mg by mouth 2 times daily (with meals)        metoprolol succinate 25 MG 24 hr tablet    TOPROL-XL    30 tablet    Take 0.5 tablets (12.5 mg) by mouth 2 times daily        NITROSTAT 0.4 MG sublingual tablet   Generic drug:  nitroGLYcerin      Place 0.4 mg under the tongue every 5 minutes as needed for chest pain For chest pain place 1 tablet under the tongue every 5 minutes for 3 doses. If symptoms persist 5 minutes after 1st dose call 911.        rosuvastatin 20 MG tablet    CRESTOR    30 tablet    Take 1 tablet (20 mg) by mouth At Bedtime    Acute systolic heart failure (H)       warfarin 2 MG tablet    COUMADIN     Take 4 mg by mouth daily On 7/1 and 7/2, then recheck INR at [facility] and take as directed

## 2018-11-13 NOTE — PROGRESS NOTES
Patient of Dr. Jenni Ortiz seen in clinic for psychosocial assessment.   60 minutes spent with patient. 100% of visit consisted of counseling related to issues surrounding LVAD implant and Heart Transplant.    Psychosocial Assessment   Name: Mariusz Sharp     MRN:  [id number]        Patient Name / Age / Race: Mariusz Sharp 58 year old    Source of Information: Patient   : Christine Rainey   Interview Date: November 13, 2018   Reason for Referral: Heart Transplant and Mechanical Circulatory Support     Current Living Situation   Location (City/State): [facility] [place] [address]   With Whom: Living arrangements - the patient lives with his 83 y/o Father in his Father's home. Has lived with his Dad for 7 years.   Type of Home: single family   Working Phone? Yes    Three Pronged Outlet? Yes    Distance from Hospital: 2 hours   Need to Relocate Post Surgery? Yes    Discussed? Yes , written information provided on local lodging options     Vocational/Employment/Financial   Employment: Disabled since Heart Attack in June of 2018   Occupation: Had been working as a  in an elementary school for 4-5 years, but hasn't been able to return to work since his MI in June. Has LTD through his employer, who has helped him apply for SSDI-pending.   Education: Some college   Wrights? No   Income: disabilty insurance-Long-term disability. Advocacy group helped him apply for SSDI   Insurance: None-Just lost insurance October 31st-applied for through [place] [employer] website one week ago for MA.   Name of Private Insurance: N/A   Medication Coverage: No coverage-Has all medications and is able to afford them right now. Not very expensive    In current situation, pt. can afford medication and supply costs:  Yes    Other Financial Concerns/Issues: Will need health insurance soon. Transplant financial counseling office aware.     Family/Social Support   Marital Status:    Partner Name: Not in current  relationship with anyone   Length of Time : A few years-unsure   Partner s Employment: N/A   Relationship: Stable/supportive, has relationship with ex-wife and ex-girlfriend as they are Mom's to his Children   Children: 2 sons have  from [place]; one as an infant and the other at age 24. He has 3 other Sons-Salo(34), Andrew(30), and Maurice(14). All live in [place]. Maurice lives with patient's ex-girlfriend   Parents: Dad-82 needs assistance with medications and insulin shots. Able to manage most of his own ADLs   Siblings: Brother-Romeo retired and lives 1.5 blocks away. Helps take care of Dad and will be patient's primary caregiver post-VAD   Other Family or Friends Close by: Has a good friend named Ryle who is  with children, but able to take time off work to be a caregiver   Support System: available, helpful   Primary Support Person: Brother and Friend, Ryle. He used to want his ex-girlfriend, Lisa to be his caregiver, but states that they are really not together right now. They still have a relationship because of their 15 y/o son.   Issues: None-they have accompanied him to appointments and have met MD's in the hospital this past Summer     Activities/Functional Ability   Current Level: ambulatory and independent with ADL's, but struggling with fatigue and SOB   Daily Activities: Reports he is not able to do much anymore because of how sick he feels   Transportation: Self, but states Brother, or friend will provide transportation     Medical Status   Patient s understanding of need for surgical intervention: Good   Advanced Directive? No; Currently, his 2 older Sons would need to be his decision-makers.    Details: Provided him with blank copy and education   Adherence History: No known non-compliance   Ability to Adhere to Complex Medical Regime: Yes     Behavioral   Chemical Dependency Issues: No   Smoker? No   Psychiatric impairment: No   Coping Style/Strategies: Uses humor-has experienced a  lot of stress with the death of 2 sons, divorce and recent relationship breakups. Loves to spend time outdoors, biking, fishing, walking. Has not been able to enjoy these activities since his MI.   Recent Legal History: No   Teaching Completed During Assessment Related To Transplant and Mechanical Circulatory Support: 1. Housing and relocation needs post surgery.  2. Caregiver needs post surgery.  3. Financial issues related to surgery.  4. Risks of alcohol use post surgery.  5. Common psychosocial stressors pre/post surgery.  6. Support group availability.   Psychosocial Risks Reviewed Related To Transplant and Mechanical Circulatory Support: 1. Increased stress related to your emotional, family, social, employment, or financial situation.  2. Affect on work and/or disability benefits.  3. Affect on future health and life insurance.  4. Outcome expectations may not be met.  5. Mental Health risks: anxiety, depression, PTSD, guilt, grief and chronic fatigue.     Observed Behavior   Well informed? Yes  Angry? No   Process info well? Yes  Hostile? No   Evasive? No Oriented X3? Yes    Cautious/Suspicious? No Motivated? Yes    Appropriate Behavior? Yes  Depressed? No   Appropriate Affect? Yes  Anxious? No   Interview Observations: Good eye contact and engaged in conversation     Selection Criteria Met   Plan for Support Yes    Chemical Dependence Yes    Smoking Yes    Mental Health Yes    Adequate Finances No; but trying to apply for MA     Risk Issues:    None, as long as he can get on medical assistance for health insurance    Final Evaluation/Assessment:     This writer met with Mariusz in the hospital in July this past Summer and completed an evaluation for VAD/Transplant. Saw him again in clinic today. He presented alone, but his caregivers have accompanied him to other appointments and he states that his Brother and Friend, Ryle will be able to provide 24-hour care. His Brother lives a block and a half away and  will be able to change his daily dressing long-term. He doesn't seem to have any mental health issues or chemical dependency issues. As long as he can secure [place] medical Assistance, he doesn't seem to have any barriers toward pursuing LVAD or heart transplant.    Patient understands risks and benefits of Heart Transplant and Mechanical Circulatory Support: Yes     Recommendation:    Good; pending insurance    Signature: Christine Rainey     Title: Licensed Independent Clinical                Interview Date: November 13, 2018

## 2018-11-13 NOTE — PATIENT INSTRUCTIONS
Cardiology Providers you saw during your visit:  Dr. Ortiz    Medication changes:  No medication changes.    Follow up:  We will follow up with your dentist.  We will try to move the colonoscopy to a sooner date.        Please call if you have:  1. Weight gain of more than 2 pounds in a day or 5 pounds in a week  2. Increased shortness of breath, swelling or bloating  3. Dizziness, lightheadedness   4. Any questions or concerns.     Follow the American Heart Association Diet and Lifestyle recommendations:  Limit saturated fat, trans fat, sodium, red meat, sweets and sugar-sweetened beverages. If you choose to eat red meat, compare labels and select the leanest cuts available.  Aim for at least 150 minutes of moderate physical activity or 75 minutes of vigorous physical activity - or an equal combination of both - each week.    During business hours: [phone], press option # 1 to be routed to the [facility] then option # 3 for medical questions to speak with a nurse     After hours, weekends or holidays: On Call Cardiologist- [phone]   option #4 and ask to speak to the on-call Cardiologist. Inform them you are a CORE/heart failure patient at the [facility].      Rosario Cooney, RN  Cardiology Nurse Care Coordinator    Keep up the good work!    Take Care!

## 2018-11-13 NOTE — NURSING NOTE
Diet: Patient instructed regarding a heart healthy diet, including discussion of reduced fat and sodium intake. Patient demonstrated understanding of this information and agreed to call with further questions or concerns.    Labs: Patient was given results of the laboratory testing obtained today.Patient demonstrated understanding of this information and agreed to call with further questions or concerns.     Med Reconcile: Reviewed and verified all current medications with the patient. The updated medication list was printed and given to the patient.    Patient stated he understood all health information given and agreed to call with further questions or concerns.

## 2018-11-13 NOTE — IP AVS SNAPSHOT
MRN:[id number]                      After Visit Summary   11/13/2018    Mariusz Sharp    MRN: [id number]           Visit Information        Department      11/13/2018  1:31 PM [address] [facility]          Review of your medicines      UNREVIEWED medicines. Ask your doctor about these medicines        Dose / Directions    amiodarone 200 MG tablet   Commonly known as:  PACERONE/CODARONE   Used for:  Acute systolic heart failure (H)        Dose:  200 mg   Take 1 tablet (200 mg) by mouth daily   Quantity:  60 tablet   Refills:  11       clopidogrel 75 MG tablet   Commonly known as:  PLAVIX        Dose:  75 mg   Take 75 mg by mouth daily   Refills:  0       digoxin 125 MCG tablet   Commonly known as:  LANOXIN   Used for:  Acute systolic heart failure (H)        Dose:  125 mcg   Take 1 tablet (125 mcg) by mouth daily   Quantity:  30 tablet   Refills:  11       famotidine 20 MG tablet   Commonly known as:  PEPCID        Dose:  20 mg   Take 20 mg by mouth 2 times daily   Refills:  0       furosemide 20 MG tablet   Commonly known as:  LASIX   Used for:  Chronic systolic heart failure (H)        Dose:  20 mg   Take 1 tablet (20 mg) by mouth 2 times daily   Quantity:  180 tablet   Refills:  3       hydrALAZINE 50 MG tablet   Commonly known as:  APRESOLINE   Used for:  Acute systolic heart failure (H)        Dose:  50 mg   Take 1 tablet (50 mg) by mouth 3 times daily   Quantity:  120 tablet   Refills:  11       insulin aspart 100 UNIT/ML injection   Commonly known as:  NovoLOG PEN        Less than 120 mg/dL = 0 Units 120-149 mg/dL = 1 Unit 150-199 mg/dL = 2 Units 200-249 mg/dL = 3 Units 250-299 mg/dL = 5 Units 300-349 mg/dL = 7 Units 350 mg/dL or greater = 8 Units   Refills:  0       insulin glargine 100 UNIT/ML injection   Commonly known as:  LANTUS   Used for:  Type 2 diabetes mellitus with hyperglycemia, with long-term current use of insulin (H)        Dose:  20 Units   Inject 20 Units Subcutaneous  every evening   Quantity:  3 mL   Refills:  11       isosorbide mononitrate 30 MG 24 hr tablet   Commonly known as:  IMDUR   Used for:  Acute systolic heart failure (H)        Dose:  30 mg   Take 1 tablet (30 mg) by mouth daily   Quantity:  30 tablet   Refills:  11       metFORMIN 500 MG tablet   Commonly known as:  GLUCOPHAGE        Dose:  500 mg   Take 500 mg by mouth 2 times daily (with meals)   Refills:  0       metoprolol succinate 25 MG 24 hr tablet   Commonly known as:  TOPROL-XL        Dose:  12.5 mg   Take 0.5 tablets (12.5 mg) by mouth 2 times daily   Quantity:  30 tablet   Refills:  11       NITROSTAT 0.4 MG sublingual tablet   Generic drug:  nitroGLYcerin        Dose:  0.4 mg   Place 0.4 mg under the tongue every 5 minutes as needed for chest pain For chest pain place 1 tablet under the tongue every 5 minutes for 3 doses. If symptoms persist 5 minutes after 1st dose call 911.   Refills:  0       rosuvastatin 20 MG tablet   Commonly known as:  CRESTOR   Used for:  Acute systolic heart failure (H)        Dose:  20 mg   Take 1 tablet (20 mg) by mouth At Bedtime   Quantity:  30 tablet   Refills:  11       warfarin 2 MG tablet   Commonly known as:  COUMADIN        Dose:  4 mg   Take 4 mg by mouth daily On 7/1 and 7/2, then recheck INR at [facility] and take as directed   Refills:  0                Protect others around you: Learn how to safely use, store and throw away your medicines at www.disposemymeds.org.         Follow-ups after your visit        Your next 10 appointments already scheduled     Nov 13, 2018  2:30 PM CST   Heart Cath Right [id number]   [facility]    [address] [address]   [phone]            Dec 12, 2018   Procedure with Kenton Jain MD   [facility], [place], Endoscopy ([facility])    [address]  [place] [address]   [phone]           The [place] campus is located on the corner of [address] and [address] on the [facility]. It is easily accessible from virtually any point in the [facility] area, via [address] and [address].               Care Instructions        Further instructions from your care team       [facility]                        Interventional Cardiology  Discharge Instructions   Post Right Heart Cath      AFTER YOU GO HOME:    DO drink plenty of fluids    DO resume your regular diet and medications unless otherwise instructed by your Primary Physician    Do Not scrub the procedure site vigorously    No lotion or powder to the puncture site for 3 days    CALL YOUR PRIMARY PHYSICIAN IF: You may resume all normal activity.  Monitor neck site for bleeding, swelling, or voice changes. If you notice bleeding or swelling immediately apply pressure to the site and call number below to speak with Cardiology Fellow.  If you experience any changes in your breathing you should call your doctor immediately or come to the closest Emergency Department.  Do not drive yourself.    ADDITIONAL INSTRUCTIONS: Medications: You are to resume all home medications including anticoagulation therapy unless otherwise advised by your primary cardiologist or nurse coordinator.    Follow Up: Per your primary cardiology team    If you have any questions or concerns regarding your procedure site please call [phone] at anytime and ask for Cardiology Fellow on call.  They are available 24 hours a day.  You may also contact the Cardiology Clinic after hours number at [phone].                                                       Telephone Numbers [phone] Monday-Friday 8:00 am to 4:30 pm    [phone] After 4:30 pm Monday-Friday, Weekends & Holidays  Ask for Interventional Cardiologist on call. Someone is on call 24 hours/day   [facility] toll free  number [phone] Monday-Friday 8:00 am to 4:30 pm   [facility] Emergency Dept [phone]                    Additional Information About Your Visit        Care EveryWhere ID     This is your Care EveryWhere ID. This could be used by other organizations to access your [facility] medical records  [id number]         Primary Care Provider Office Phone # Fax #    [facility] [phone]      Equal Access to Services     GILLIAN COX : Hadii aad ku hadasho Soomaali, waaxda luqadaha, qaybta kaalmada adeegyada, waxay idiin hayaan adeeg phoenixfrankiefernanda bar . So [facility] [phone].    ATENCIÓN: Si raquella español, tiene a waddell disposición servicios gratuitos de asistencia lingüística. [facility] [phone].    We comply with applicable federal civil rights laws and [place] laws. We do not discriminate on the basis of race, color, national origin, age, disability, sex, sexual orientation, or gender identity.            Thank you!     Thank you for choosing [facility] for your care. Our goal is always to provide you with excellent care. Hearing back from our patients is one way we can continue to improve our services. Please take a few minutes to complete the written survey that you may receive in the mail after you visit with us. Thank you!             Medication List: This is a list of all your medications and when to take them. Check marks below indicate your daily home schedule. Keep this list as a reference.      Medications           Morning Afternoon Evening Bedtime As Needed    amiodarone 200 MG tablet   Commonly known as:  PACERONE/CODARONE   Take 1 tablet (200 mg) by mouth daily                                clopidogrel 75 MG tablet   Commonly known as:  PLAVIX   Take 75 mg by mouth daily                                digoxin 125 MCG tablet   Commonly known as:  LANOXIN   Take 1 tablet (125 mcg) by mouth daily                                famotidine 20 MG tablet   Commonly known as:  PEPCID    Take 20 mg by mouth 2 times daily                                furosemide 20 MG tablet   Commonly known as:  LASIX   Take 1 tablet (20 mg) by mouth 2 times daily                                hydrALAZINE 50 MG tablet   Commonly known as:  APRESOLINE   Take 1 tablet (50 mg) by mouth 3 times daily                                insulin aspart 100 UNIT/ML injection   Commonly known as:  NovoLOG PEN   Less than 120 mg/dL = 0 Units 120-149 mg/dL = 1 Unit 150-199 mg/dL = 2 Units 200-249 mg/dL = 3 Units 250-299 mg/dL = 5 Units 300-349 mg/dL = 7 Units 350 mg/dL or greater = 8 Units                                insulin glargine 100 UNIT/ML injection   Commonly known as:  LANTUS   Inject 20 Units Subcutaneous every evening                                isosorbide mononitrate 30 MG 24 hr tablet   Commonly known as:  IMDUR   Take 1 tablet (30 mg) by mouth daily                                metFORMIN 500 MG tablet   Commonly known as:  GLUCOPHAGE   Take 500 mg by mouth 2 times daily (with meals)                                metoprolol succinate 25 MG 24 hr tablet   Commonly known as:  TOPROL-XL   Take 0.5 tablets (12.5 mg) by mouth 2 times daily                                NITROSTAT 0.4 MG sublingual tablet   Place 0.4 mg under the tongue every 5 minutes as needed for chest pain For chest pain place 1 tablet under the tongue every 5 minutes for 3 doses. If symptoms persist 5 minutes after 1st dose call 911.   Generic drug:  nitroGLYcerin                                rosuvastatin 20 MG tablet   Commonly known as:  CRESTOR   Take 1 tablet (20 mg) by mouth At Bedtime                                warfarin 2 MG tablet   Commonly known as:  COUMADIN   Take 4 mg by mouth daily On 7/1 and 7/2, then recheck INR at [facility] and take as directed

## 2018-11-14 ENCOUNTER — TELEPHONE (OUTPATIENT)
Dept: GASTROENTEROLOGY | Facility: CLINIC | Age: 58
End: 2018-11-14

## 2018-11-14 ENCOUNTER — TELEPHONE (OUTPATIENT)
Dept: CARDIOLOGY | Facility: CLINIC | Age: 58
End: 2018-11-14

## 2018-11-14 DIAGNOSIS — Z12.11 SPECIAL SCREENING FOR MALIGNANT NEOPLASMS, COLON: Primary | ICD-10-CM

## 2018-11-14 NOTE — TELEPHONE ENCOUNTER
Patient scheduled for Colonoscopy    Indication for procedure. Screening pre LVAD    Referring Provider. Jenni Marie    ? N/A    Arrival time verified? 1300    Facility location verified:   [facility] - [address], [address]    Instructions given regarding prep and procedure    Prep Type   Golytely eRx: [facility] [address] [place], [place] - [address]    Are you taking any anticoagulants or blood thinners? Warfarin, clopidogrel - waiting to hear from anticoag clinic ([facility])  INR:     Instructions given?   Pt requests resend endoscopy appointment information communication via unencrypted e-mail. This includes Split-dose Golytely instructions, Unit J map, Conscious Sedation policy, procedure date/time/location/provider.  Pt acknowledges that they have agreed to accept the risks and receive unencrypted communications for this incidence.     Electronic implanted devices? ICD  Since July 16    Pre procedure teaching completed? Yes    Transportation from procedure? Working on .  Should be either brother or friend.     H&P / Pre op physical completed? Scheduled 15, 16-Nov-2018    Christy Redman RN  [facility]

## 2018-11-14 NOTE — TELEPHONE ENCOUNTER
Called patient back, the colonoscopy has been pushed to December 7th, and he has spoken with his medication monitoring clinic about Coumadin

## 2018-11-14 NOTE — PROGRESS NOTES
D: Pt arrived in cath lab for Right Heart Catheterization  I: Right heart catheterization completed per MD.  7fr sheath removed from rijv* site, manual pressure applied until hemostasis achieved.  A: right neck *site clean, dry and intact. Soft, no hematoma.  P: Pt to transfer to  for discharge.  Pt educated on watching neck site for bleeding, hematoma, pressure on airway and voice changes.

## 2018-11-14 NOTE — PROGRESS NOTES
Returned from CCL s/p RHC.  VSS.  Denies pain.  RIJ site covered with a bandage, CDI.  Reviewed discharge instructions with patient.  Discharged to home independently.

## 2018-11-14 NOTE — TELEPHONE ENCOUNTER
[facility] Call Center    Phone Message    May a detailed message be left on voicemail: no    Reason for Call: Other: Pt of Dr. Jenni Ortiz, pt is calling wondering if he should take his medicine, pt is having a colonoscopy on Tuesday.  Please call the pt back     Action Taken: Message routed to:  [facility] Cardiology

## 2018-11-15 ENCOUNTER — TELEPHONE (OUTPATIENT)
Dept: CARDIOLOGY | Facility: CLINIC | Age: 58
End: 2018-11-15

## 2018-11-15 DIAGNOSIS — I50.22 CHRONIC SYSTOLIC HEART FAILURE (H): ICD-10-CM

## 2018-11-15 RX ORDER — FUROSEMIDE 40 MG
40 TABLET ORAL 2 TIMES DAILY
Qty: 180 TABLET | Refills: 3 | Status: ON HOLD | OUTPATIENT
Start: 2018-11-15 | End: 2019-01-10

## 2018-11-15 NOTE — TELEPHONE ENCOUNTER
[facility]    Phone Message    May a detailed message be left on voicemail: yes    Reason for Call: Other: Per call from PT he missed a call from Rosario this morning is returning her calls       Action Taken: Message routed to:  [facility] Cardiology

## 2018-11-15 NOTE — TELEPHONE ENCOUNTER
Per Dr. Ortiz based on [facility] results increase Lasix to 40 mg by mouth twice daily, repeat labs in 1 week. Continue to monitor weight and call with changes.  Mariusz verbalized understanding and is agreeable with plan.

## 2018-11-19 NOTE — PROGRESS NOTES
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 He denies any further episode of exertional N/V.  He denies fever, chills, chest pain, palpitations, SOB, change in RADER, PND, orthopnea, vomiting, diarrhea, hematochezia, melena, or LE edema however admits that his exercise capacity and overall energy are declining.       PAST MEDICAL HISTORY:  Past Medical History:   Diagnosis Date     Acute kidney injury (H)      CKD (chronic kidney disease)      Coronary artery disease      Diabetes mellitus (H)      HTN (hypertension)      Hyperlipidemia      Ischemic cardiomyopathy      Paroxysmal atrial flutter (H)      Systolic heart failure (H)      Ventricular tachycardia (H)        FAMILY HISTORY:  Reviewed    SOCIAL HISTORY:  Social History     Social History     Marital status: Single     Spouse name: N/A     Number of children: N/A     Years of education: N/A     Social History Main Topics     Smoking status: Never Smoker     Smokeless tobacco: Never Used     Alcohol use Not on file     Drug use: Not on file     Sexual activity: Not on file     Other Topics Concern     Not on file     Social History Narrative       CURRENT MEDICATIONS:  Outpatient Medications Prior to Visit   Medication Sig Dispense Refill     amiodarone (PACERONE/CODARONE) 200 MG tablet Take 1 tablet (200 mg) by mouth daily 60 tablet 11     clopidogrel (PLAVIX) 75 MG tablet Take 75 mg by mouth daily       digoxin (LANOXIN) 125 MCG tablet Take 1 tablet (125 mcg) by mouth daily 30 tablet 11     famotidine (PEPCID) 20 MG tablet Take 20 mg by mouth 2 times daily       hydrALAZINE (APRESOLINE) 50 MG tablet Take 1 tablet (50 mg) by mouth 3 times daily 120 tablet 11     insulin aspart (NOVOLOG PEN) 100 UNIT/ML injection Less than 120 mg/dL = 0  Units  120-149 mg/dL = 1 Unit  150-199 mg/dL = 2 Units  200-249 mg/dL = 3 Units  250-299 mg/dL = 5 Units  300-349 mg/dL = 7 Units  350 mg/dL or greater = 8 Units       insulin glargine (LANTUS SOLOSTAR) 100 UNIT/ML pen Inject 20 Units Subcutaneous every evening 3 mL 11     isosorbide mononitrate (IMDUR) 30 MG 24 hr tablet Take 1 tablet (30 mg) by mouth daily 30 tablet 11     metFORMIN (GLUCOPHAGE) 500 MG tablet Take 500 mg by mouth 2 times daily (with meals)       metoprolol succinate (TOPROL-XL) 25 MG 24 hr tablet Take 0.5 tablets (12.5 mg) by mouth 2 times daily 30 tablet 11     nitroGLYcerin (NITROSTAT) 0.4 MG sublingual tablet Place 0.4 mg under the tongue every 5 minutes as needed for chest pain For chest pain place 1 tablet under the tongue every 5 minutes for 3 doses. If symptoms persist 5 minutes after 1st dose call 911.       rosuvastatin (CRESTOR) 20 MG tablet Take 1 tablet (20 mg) by mouth At Bedtime 30 tablet 11     warfarin (COUMADIN) 2 MG tablet Take 4 mg by mouth daily On 7/1 and 7/2, then recheck INR at [facility] and take as directed       furosemide (LASIX) 20 MG tablet Take 0.5 tablets (10 mg) by mouth 2 times daily 30 tablet 3     No facility-administered medications prior to visit.        ROS:   CONSTITUTIONAL: Denies fever, chills, fatigue, or weight fluctuations.   HEENT: Denies headache and changes in speech. He complains of vision changes.   CV: Refer to HPI.   PULMONARY:Denies shortness of breath, cough, or previous TB exposure.   GI:Denies nausea, vomiting, diarrhea, and abdominal pain. Bowel movements are regular.   :See HPI  EXT:Denies lower extremity edema.   SKIN:Denies abnormal rashes or lesions.   MUSCULOSKELETAL:Denies upper or lower extremity weakness and pain.   NEUROLOGIC:Denies lightheadedness, dizziness, seizures, or upper or lower extremity paresthesia.     EXAM:  BP 91/59 (BP Location: Right arm, Patient Position: Chair, Cuff Size: Adult Regular)   "Pulse 57  Ht 1.626 m (5' 4\")  Wt 78.5 kg (173 lb)  SpO2 98%  BMI 29.7 kg/m2  GENERAL: Appears alert and oriented times three.   HEENT: Normocephalic, atraumatic.  Mucous membranes moist and without lesions.  NECK: Supple and without lymphadenopathy. JVD 10-12cm.  CV: RRR, S1S2 present without murmur, rub, or gallop.   RESPIRATORY: Respirations regular, even, and unlabored. Lungs CTA throughout.   GI: Soft and non distended with normoactive bowel sounds present in all quadrants. No tenderness, rebound, guarding. No organomegaly.    EXTREMITIES: No peripheral edema. 2+ bilateral pedal pulses.   NEUROLOGIC: Alert and orientated x 3. CN II-XII grossly intact. No focal deficits.   MUSCULOSKELETAL: No joint swelling or tenderness.   SKIN: No jaundice. No rashes or lesions.     Labs:  Reviewed     Diagnostics:  Recent Results (from the past 4320 hour(s))   ECHO COMPLETE WITH OPTISON    Narrative    [id number]  [id number]  [id number]  [id number]^STEFANIA^MANNY^V           [facility],[place]  Echocardiography Laboratory  [address] [place] [id number]     Name: ADILSON RINCON  MRN: [id number]  : 1960  Study Date: 2018 11:27 AM  Age: 57 yrs  Gender: Male  Patient Location: [facility]  Reason For Study: Cardiac Arrest  Ordering Physician: MANNY AGUIAR  Referring Physician: SELF, REFERRED  Performed By: Laya Read RDCS     BSA: 1.8 m2  Height: 64 in  Weight: 170 lb  HR: 78  BP: 99/76 mmHg  _____________________________________________________________________________  __        Procedure  Echocardiogram with two-dimensional, color and spectral Doppler performed.  Contrast Optison. Optison (NDC #5952-7575-65) given intravenously. Patient was  given 5 ml mixture of 3 ml Optison and 6 ml saline. 4 ml wasted.  _____________________________________________________________________________  __        Interpretation Summary  Left ventricular wall thickness is normal. Borderline " left ventricular  dilation is present. LVIDd is 5.19cm The Ejection Fraction is estimated at 20-  25%. The mid to distal anteroseptum, mid to distal anterior wall, mid to  distal anterolateral wall and mid to apical septum and apex are akinetic. This  is consistent with large LAD territory infarct. Laminar thrombus is seen in  the LV apex  The right ventricle is normal size. Difficult to evaluate RV function but it  appears to be mild to moderately reduced.  Moderate tricuspid insufficiency is present. Right ventricular systolic  pressure is 26mmHg above the right atrial pressure.  Dilation of the inferior vena cava is present with normal respiratory  variation in diameter. Estimated mean right atrial pressure is 8 mmHg (mildly  elevated).  No pericardial effusion is present.     Previous study not available for comparison.  _____________________________________________________________________________  __        Left Ventricle  Left ventricular wall thickness is normal. Borderline left ventricular  dilation is present. LVIDd is 5.19cm. Grade III or advanced diastolic  dysfunction. The Ejection Fraction is estimated at 20-25%. The mid to distal  anteroseptum, mid to distal anterior  wall, mid to distal anterolateral wall and mid to apical septum and apex are  akinetic. This is consistent with large LAD territory infarct. Laminar  thrombus is seen in the LV apex.     Right Ventricle  Right ventricular wall thickness is normal. The right ventricle is normal  size. Difficult to evaluate RV function but it appears to be mild to  moderately reduced. A pacemaker lead is noted in the right ventricle.     Atria  Moderate left atrial enlargement is present. Moderate right atrial enlargement  is present. A pacemaker lead is noted in the right atrium.     Mitral Valve  The mitral valve is normal. Trace mitral insufficiency is present.        Aortic Valve  Aortic valve is normal in structure and function. The aortic valve  is  tricuspid.     Tricuspid Valve  The tricuspid valve is normal. Moderate tricuspid insufficiency is present.  The right ventricular systolic pressure is approximated at 26.0 mmHg plus the  right atrial pressure. Right ventricular systolic pressure is 26mmHg above the  right atrial pressure.     Pulmonic Valve  The pulmonic valve is normal. Trace pulmonic insufficiency is present.     Vessels  The aorta root is normal. Dilation of the inferior vena cava is present with  normal respiratory variation in diameter. Estimated mean right atrial pressure  is 8 mmHg (mildly elevated).     Pericardium  No pericardial effusion is present.        Compared to Previous Study  Previous study not available for comparison.  _____________________________________________________________________________  __  MMode/2D Measurements & Calculations  IVSd: 0.74 cm     LVIDd: 5.2 cm  LVIDs: 4.0 cm  LVPWd: 1.0 cm  FS: 23.3 %  LV mass(C)d: 167.6 grams  LV mass(C)dI: 91.8 grams/m2  asc Aorta Diam: 3.6 cm  LVOT diam: 2.1 cm  LVOT area: 3.5 cm2  LA Volume (BP): 81.1 ml  LA Volume Index (BP): 44.3 ml/m2  RWT: 0.40           Doppler Measurements & Calculations  MV E max sasha: 101.0 cm/sec  MV A max sasha: 16.5 cm/sec  MV E/A: 6.1  MV dec slope: 660.0 cm/sec2  PA V2 max: 96.5 cm/sec  PA max PG: 3.7 mmHg  PA acc time: 0.11 sec  TR max sasha: 255.0 cm/sec  TR max P.0 mmHg  E/E' av.8  Lateral E/e': 11.0  Medial E/e': 34.5     _____________________________________________________________________________  __           Report approved by: LEÓN Powell 2018 02:05 PM                   Assessment and Plan: 57 year old male with a past medical history including CAD (s/p STEMI with ALYSSA to LAD 18), 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    Chronic systolic heart failure secondary to ICM.    Stage C, NYHA Class  IIIb  ACEi/ARB: no given CRI; Will continue Hydralazine 50 mg po TID. Defer increase occasional symptomatic episodes c/w hypotension  BB Toprol XL 12.5 mg po BID.  Defer increase occasional symptomatic episodes c/w hypotension  Aldosterone antagonist contraindicated due to renal dysfunction  SCD prophylaxis ICD.  Fluid status hypervolemic.increase Lasix to 20 mg po BID.   Discussed with patient overall heart failure status and option of proceeding with LVAD placement (LVAD evaluation completed during last hospitalization).  Pt now ready to proceed with LVAD if needed.  Will plan for RHC prior to next visit.  Will continue to complete LVAD eval - colonoscopy and dental.    CAD. s/p STEMI with ALYSSA to LAD 6/27/18  - Continue Crestor, Plavix, and Toprol XL.     PAF. CHADSVASC-4.   - Coumadin per AC.   - Continue Toprol XL.     VT/VF. With cardiac arrest.   - No recent ICD shocks  - Last device interrogation with no evidence of arrhythmias since prior shocks7/16/18.      LV thrombus.   - Coumadin as above.     CKD Stage III.   - Grossly stable    Follow-up:  Labs next week.  RTC to see me on 11/13/18 with repeat RHC prior.     Jenni Ortiz MD  Section Head - Advanced Heart Failure, Transplantation and Mechanical Circulatory Support  Co-Director - Adult Congenital and Cardiovascular Genetics [facility]  Associate Professor of Medicine, [facility]

## 2018-11-30 ENCOUNTER — TELEPHONE (OUTPATIENT)
Dept: GASTROENTEROLOGY | Facility: CLINIC | Age: 58
End: 2018-11-30

## 2018-11-30 NOTE — TELEPHONE ENCOUNTER
Called pt to update on plan of care for colonoscopy:    -Spoke with Dr. Ortiz, OK to hold coumadin for 5 days.   -Per Dr. Ortiz, would prefer pt to continue on Plavix. Consulted Dr. Mercado. He is OK with this plan.     Pt verbalized understanding that he will stop coumadin for 5 days, but remain on his plavix.

## 2018-11-30 NOTE — TELEPHONE ENCOUNTER
Patient scheduled for colonoscopy    Indication for procedure. Pre LVAD     Referring Provider. Dr. Ortiz     ? no    Arrival time verified? 1205pm     Facility location verified? [address] [facility], 3rd floor, 3C,     Instructions given regarding prep and procedure    Prep Type golytely- sent     Are you taking any anticoagulants or blood thinners? Coumadin and plavix-- waiting to hear back from Dr. Ortiz in regards to plan     Instructions given? Yes     Electronic implanted devices? ICD, placed in July     Pre procedure teaching completed? Yes    Transportation from procedure? brother    H&P / Pre op physical completed? Still needs pre op physical. Fax number provided. [phone]

## 2018-12-05 ENCOUNTER — TRANSFERRED RECORDS (OUTPATIENT)
Dept: HEALTH INFORMATION MANAGEMENT | Facility: CLINIC | Age: 58
End: 2018-12-05

## 2018-12-05 LAB
ALT SERPL-CCNC: 30 U/L (ref 18–65)
AST SERPL-CCNC: 22 U/L (ref 10–40)
CREAT SERPL-MCNC: 1.38 MG/DL (ref 0.8–1.5)
GFR SERPL CREATININE-BSD FRML MDRD: 54 ML/MIN/1.73M2
GLUCOSE SERPL-MCNC: 308 MG/DL (ref 60–99)
POTASSIUM SERPL-SCNC: 4.2 MEQ/L (ref 3.5–5.1)

## 2018-12-07 ENCOUNTER — ANESTHESIA EVENT (OUTPATIENT)
Dept: SURGERY | Facility: CLINIC | Age: 58
End: 2018-12-07
Payer: MEDICAID

## 2018-12-07 ENCOUNTER — ANESTHESIA (OUTPATIENT)
Dept: SURGERY | Facility: CLINIC | Age: 58
End: 2018-12-07
Payer: MEDICAID

## 2018-12-07 ENCOUNTER — HOSPITAL ENCOUNTER (OUTPATIENT)
Facility: CLINIC | Age: 58
Discharge: HOME | End: 2018-12-07
Attending: INTERNAL MEDICINE | Admitting: INTERNAL MEDICINE
Payer: MEDICAID

## 2018-12-07 VITALS
DIASTOLIC BLOOD PRESSURE: 75 MMHG | SYSTOLIC BLOOD PRESSURE: 106 MMHG | HEIGHT: 64 IN | RESPIRATION RATE: 18 BRPM | TEMPERATURE: 97.6 F | WEIGHT: 174.16 LBS | BODY MASS INDEX: 29.73 KG/M2 | OXYGEN SATURATION: 97 %

## 2018-12-07 LAB
BUN SERPL-MCNC: 31 MG/DL (ref 7–30)
COLONOSCOPY: NORMAL
CREAT SERPL-MCNC: 1.42 MG/DL (ref 0.66–1.25)
ERYTHROCYTE [DISTWIDTH] IN BLOOD BY AUTOMATED COUNT: 14 % (ref 10–15)
GFR SERPL CREATININE-BSD FRML MDRD: 52 ML/MIN/1.7M2
GLUCOSE BLDC GLUCOMTR-MCNC: 214 MG/DL (ref 70–99)
GLUCOSE BLDC GLUCOMTR-MCNC: 243 MG/DL (ref 70–99)
HCT VFR BLD AUTO: 42.6 % (ref 40–53)
HGB BLD-MCNC: 13.8 G/DL (ref 13.3–17.7)
INR PPP: 1.14 (ref 0.86–1.14)
MCH RBC QN AUTO: 28.1 PG (ref 26.5–33)
MCHC RBC AUTO-ENTMCNC: 32.4 G/DL (ref 31.5–36.5)
MCV RBC AUTO: 87 FL (ref 78–100)
PLATELET # BLD AUTO: 240 10E9/L (ref 150–450)
POTASSIUM SERPL-SCNC: 4 MMOL/L (ref 3.4–5.3)
RBC # BLD AUTO: 4.91 10E12/L (ref 4.4–5.9)
WBC # BLD AUTO: 6.2 10E9/L (ref 4–11)

## 2018-12-07 PROCEDURE — 88305 TISSUE EXAM BY PATHOLOGIST: CPT | Performed by: INTERNAL MEDICINE

## 2018-12-07 PROCEDURE — 85027 COMPLETE CBC AUTOMATED: CPT | Performed by: INTERNAL MEDICINE

## 2018-12-07 PROCEDURE — 36415 COLL VENOUS BLD VENIPUNCTURE: CPT | Performed by: INTERNAL MEDICINE

## 2018-12-07 PROCEDURE — 82962 GLUCOSE BLOOD TEST: CPT

## 2018-12-07 PROCEDURE — 25000132 ZZH RX MED GY IP 250 OP 250 PS 637: Performed by: ANESTHESIOLOGY

## 2018-12-07 PROCEDURE — 36000059 ZZH SURGERY LEVEL 3 EA 15 ADDTL MIN UMMC: Performed by: INTERNAL MEDICINE

## 2018-12-07 PROCEDURE — 25000128 H RX IP 250 OP 636: Performed by: NURSE ANESTHETIST, CERTIFIED REGISTERED

## 2018-12-07 PROCEDURE — 40000170 ZZH STATISTIC PRE-PROCEDURE ASSESSMENT II: Performed by: INTERNAL MEDICINE

## 2018-12-07 PROCEDURE — 27210794 ZZH OR GENERAL SUPPLY STERILE: Performed by: INTERNAL MEDICINE

## 2018-12-07 PROCEDURE — 25000125 ZZHC RX 250: Performed by: NURSE ANESTHETIST, CERTIFIED REGISTERED

## 2018-12-07 PROCEDURE — 37000008 ZZH ANESTHESIA TECHNICAL FEE, 1ST 30 MIN: Performed by: INTERNAL MEDICINE

## 2018-12-07 PROCEDURE — 37000009 ZZH ANESTHESIA TECHNICAL FEE, EACH ADDTL 15 MIN: Performed by: INTERNAL MEDICINE

## 2018-12-07 PROCEDURE — 84520 ASSAY OF UREA NITROGEN: CPT | Performed by: INTERNAL MEDICINE

## 2018-12-07 PROCEDURE — 85610 PROTHROMBIN TIME: CPT | Performed by: INTERNAL MEDICINE

## 2018-12-07 PROCEDURE — 71000027 ZZH RECOVERY PHASE 2 EACH 15 MINS: Performed by: INTERNAL MEDICINE

## 2018-12-07 PROCEDURE — 84132 ASSAY OF SERUM POTASSIUM: CPT | Performed by: INTERNAL MEDICINE

## 2018-12-07 PROCEDURE — 36000057 ZZH SURGERY LEVEL 3 1ST 30 MIN - UMMC: Performed by: INTERNAL MEDICINE

## 2018-12-07 PROCEDURE — 82565 ASSAY OF CREATININE: CPT | Performed by: INTERNAL MEDICINE

## 2018-12-07 PROCEDURE — 25000128 H RX IP 250 OP 636: Performed by: ANESTHESIOLOGY

## 2018-12-07 RX ORDER — PROPOFOL 10 MG/ML
INJECTION, EMULSION INTRAVENOUS CONTINUOUS PRN
Status: DISCONTINUED | OUTPATIENT
Start: 2018-12-07 | End: 2018-12-07

## 2018-12-07 RX ORDER — MEPERIDINE HYDROCHLORIDE 25 MG/ML
12.5 INJECTION INTRAMUSCULAR; INTRAVENOUS; SUBCUTANEOUS
Status: DISCONTINUED | OUTPATIENT
Start: 2018-12-07 | End: 2018-12-07 | Stop reason: HOSPADM

## 2018-12-07 RX ORDER — FENTANYL CITRATE 50 UG/ML
INJECTION, SOLUTION INTRAMUSCULAR; INTRAVENOUS PRN
Status: DISCONTINUED | OUTPATIENT
Start: 2018-12-07 | End: 2018-12-07

## 2018-12-07 RX ORDER — EPHEDRINE SULFATE 50 MG/ML
INJECTION, SOLUTION INTRAMUSCULAR; INTRAVENOUS; SUBCUTANEOUS PRN
Status: DISCONTINUED | OUTPATIENT
Start: 2018-12-07 | End: 2018-12-07

## 2018-12-07 RX ORDER — FLUMAZENIL 0.1 MG/ML
0.2 INJECTION, SOLUTION INTRAVENOUS
Status: DISCONTINUED | OUTPATIENT
Start: 2018-12-07 | End: 2018-12-07 | Stop reason: HOSPADM

## 2018-12-07 RX ORDER — ONDANSETRON 2 MG/ML
4 INJECTION INTRAMUSCULAR; INTRAVENOUS EVERY 6 HOURS PRN
Status: DISCONTINUED | OUTPATIENT
Start: 2018-12-07 | End: 2018-12-07 | Stop reason: HOSPADM

## 2018-12-07 RX ORDER — SODIUM CHLORIDE, SODIUM LACTATE, POTASSIUM CHLORIDE, CALCIUM CHLORIDE 600; 310; 30; 20 MG/100ML; MG/100ML; MG/100ML; MG/100ML
INJECTION, SOLUTION INTRAVENOUS CONTINUOUS
Status: DISCONTINUED | OUTPATIENT
Start: 2018-12-07 | End: 2018-12-07 | Stop reason: HOSPADM

## 2018-12-07 RX ORDER — ONDANSETRON 4 MG/1
4 TABLET, ORALLY DISINTEGRATING ORAL EVERY 6 HOURS PRN
Status: DISCONTINUED | OUTPATIENT
Start: 2018-12-07 | End: 2018-12-07 | Stop reason: HOSPADM

## 2018-12-07 RX ORDER — LIDOCAINE 40 MG/G
CREAM TOPICAL
Status: DISCONTINUED | OUTPATIENT
Start: 2018-12-07 | End: 2018-12-07 | Stop reason: HOSPADM

## 2018-12-07 RX ORDER — DEXTROSE MONOHYDRATE 25 G/50ML
25-50 INJECTION, SOLUTION INTRAVENOUS
Status: DISCONTINUED | OUTPATIENT
Start: 2018-12-07 | End: 2018-12-07 | Stop reason: HOSPADM

## 2018-12-07 RX ORDER — NICOTINE POLACRILEX 4 MG
15-30 LOZENGE BUCCAL
Status: DISCONTINUED | OUTPATIENT
Start: 2018-12-07 | End: 2018-12-07 | Stop reason: HOSPADM

## 2018-12-07 RX ORDER — NALOXONE HYDROCHLORIDE 0.4 MG/ML
.1-.4 INJECTION, SOLUTION INTRAMUSCULAR; INTRAVENOUS; SUBCUTANEOUS
Status: DISCONTINUED | OUTPATIENT
Start: 2018-12-07 | End: 2018-12-07 | Stop reason: HOSPADM

## 2018-12-07 RX ORDER — FENTANYL CITRATE 50 UG/ML
25-50 INJECTION, SOLUTION INTRAMUSCULAR; INTRAVENOUS
Status: DISCONTINUED | OUTPATIENT
Start: 2018-12-07 | End: 2018-12-07 | Stop reason: HOSPADM

## 2018-12-07 RX ADMIN — FENTANYL CITRATE 25 MCG: 50 INJECTION, SOLUTION INTRAMUSCULAR; INTRAVENOUS at 14:11

## 2018-12-07 RX ADMIN — SODIUM CHLORIDE, POTASSIUM CHLORIDE, SODIUM LACTATE AND CALCIUM CHLORIDE: 600; 310; 30; 20 INJECTION, SOLUTION INTRAVENOUS at 13:44

## 2018-12-07 RX ADMIN — PROPOFOL 25 MCG/KG/MIN: 10 INJECTION, EMULSION INTRAVENOUS at 13:50

## 2018-12-07 RX ADMIN — Medication 5 MG: at 14:21

## 2018-12-07 RX ADMIN — MIDAZOLAM 2 MG: 1 INJECTION INTRAMUSCULAR; INTRAVENOUS at 13:44

## 2018-12-07 RX ADMIN — HUMAN INSULIN 3 UNITS: 100 INJECTION, SOLUTION SUBCUTANEOUS at 13:20

## 2018-12-07 RX ADMIN — Medication 5 MG: at 14:30

## 2018-12-07 ASSESSMENT — ENCOUNTER SYMPTOMS: DYSRHYTHMIAS: 1

## 2018-12-07 NOTE — IP AVS SNAPSHOT
Same Day Surgery [address] [address]    Phone:  [phone]                                       After Visit Summary   12/7/2018    Mariusz Sharp    MRN: [id number]           After Visit Summary Signature Page     I have received my discharge instructions, and my questions have been answered. I have discussed any challenges I see with this plan with the nurse or doctor.    ..........................................................................................................................................  Patient/Patient Representative Signature      ..........................................................................................................................................  Patient Representative Print Name and Relationship to Patient    ..................................................               ................................................  Date                                   Time    ..........................................................................................................................................  Reviewed by Signature/Title    ...................................................              ..............................................  Date                                               Time          22EPIC Rev 08/18

## 2018-12-07 NOTE — IP AVS SNAPSHOT
MRN:[id number]                      After Visit Summary   12/7/2018    Mariusz Sharp    MRN: [id number]           Thank you!     Thank you for choosing [facility] for your care. Our goal is always to provide you with excellent care. Hearing back from our patients is one way we can continue to improve our services. Please take a few minutes to complete the written survey that you may receive in the mail after you visit with us. Thank you!        Patient Information     Date Of Birth          1960        About your hospital stay     You were admitted on:  December 7, 2018 You last received care in the:  Same Day Surgery [facility]    You were discharged on:  December 7, 2018       Who to Call     For medical emergencies, please call 911.  For non-urgent questions about your medical care, please call your primary care provider or clinic, None  For questions related to your surgery, please call your surgery clinic        Attending Provider     Provider Kenton Leon MD Gastroenterology       Primary Care Provider Fax #    Provider Not In System [phone]      Further instructions from your care team       [facility]  Same-Day Surgery   Adult Discharge Orders & Instructions     - Restart Coumadin today 12/7/18 at previous dose.   For 24 hours after surgery    1. Get plenty of rest.  A responsible adult must stay with you for at least 24 hours after you leave the hospital.   2. Do not drive or use heavy equipment.  If you have weakness or tingling, don't drive or use heavy equipment until this feeling goes away.  3. Do not drink alcohol.  4. Avoid strenuous or risky activities.  Ask for help when climbing stairs.   5. You may feel lightheaded.  IF so, sit for a few minutes before standing.  Have someone help you get up.   6. If you have nausea (feel sick to your stomach): Drink only clear liquids such as apple juice, ginger ale, broth  "or 7-Up.  Rest may also help.  Be sure to drink enough fluids.  Move to a regular diet as you feel able.  7. You may have a slight fever. Call the doctor if your fever is over 100 F (37.7 C) (taken under the tongue) or lasts longer than 24 hours.  8. You may have a dry mouth, a sore throat, muscle aches or trouble sleeping.  These should go away after 24 hours.  9. Do not make important or legal decisions.   Call your doctor for any of the followin.  Signs of infection (fever, growing tenderness at the surgery site, a large amount of drainage or bleeding, severe pain, foul-smelling drainage, redness, swelling).    2. It has been over 8 to 10 hours since surgery and you are still not able to urinate (pass water).    3.  Headache for over 24 hours.    4.  Numbness, tingling or weakness the day after surgery (if you had spinal anesthesia).  To contact a doctor, call Dr Jain's office at [phone] or:      [phone] and ask for the resident on call for Gastroenterology (answered 24 hours a day)      Emergency Department:  [facility]: [phone]       (TTY for hearing impaired: [phone])        Pending Results     Date and Time Order Name Status Description    2018 1420 Surgical pathology exam In process             Admission Information     Date & Time Provider Department Dept. Phone    2018 Kenton Jain MD Same Day Surgery [facility] [phone]      Your Vitals Were     Blood Pressure Temperature Respirations Height Weight Pulse Oximetry    106/75 97.6  F (36.4  C) (Oral) 18 1.626 m (5' 4\") 79 kg (174 lb 2.6 oz) 97%    BMI (Body Mass Index)                   29.9 kg/m2           Care EveryWhere ID     This is your Care EveryWhere ID. This could be used by other organizations to access your [facility] medical records  [id number]        Equal Access to Services     GILLIAN COX AH: Olena French, hiram vega, mynor espino " quirino thomasgalileo phoenixcarina la'aan ah. So [facility] [phone].    ATENCIÓN: Si raquella crystal, tiene a waddell disposición servicios gratuitos de asistencia lingüística. Trent al [phone].    We comply with applicable federal civil rights laws and [place] laws. We do not discriminate on the basis of race, color, national origin, age, disability, sex, sexual orientation, or gender identity.               Review of your medicines      CONTINUE these medicines which have NOT CHANGED        Dose / Directions    amiodarone 200 MG tablet   Commonly known as:  PACERONE/CODARONE   Used for:  Acute systolic heart failure (H)        Dose:  200 mg   Take 1 tablet (200 mg) by mouth daily   Quantity:  60 tablet   Refills:  11       clopidogrel 75 MG tablet   Commonly known as:  PLAVIX        Dose:  75 mg   Take 75 mg by mouth daily   Refills:  0       digoxin 125 MCG tablet   Commonly known as:  LANOXIN   Used for:  Acute systolic heart failure (H)        Dose:  125 mcg   Take 1 tablet (125 mcg) by mouth daily   Quantity:  30 tablet   Refills:  11       famotidine 20 MG tablet   Commonly known as:  PEPCID        Dose:  20 mg   Take 20 mg by mouth 2 times daily   Refills:  0       furosemide 40 MG tablet   Commonly known as:  LASIX   Used for:  Chronic systolic heart failure (H)        Dose:  40 mg   Take 1 tablet (40 mg) by mouth 2 times daily   Quantity:  180 tablet   Refills:  3       hydrALAZINE 50 MG tablet   Commonly known as:  APRESOLINE   Used for:  Acute systolic heart failure (H)        Dose:  50 mg   Take 1 tablet (50 mg) by mouth 3 times daily   Quantity:  120 tablet   Refills:  11       insulin aspart 100 UNIT/ML pen   Commonly known as:  NovoLOG PEN        Less than 120 mg/dL = 0 Units 120-149 mg/dL = 1 Unit 150-199 mg/dL = 2 Units 200-249 mg/dL = 3 Units 250-299 mg/dL = 5 Units 300-349 mg/dL = 7 Units 350 mg/dL or greater = 8 Units   Refills:  0       insulin glargine 100 UNIT/ML pen   Commonly known as:  LANTUS PEN   Used  for:  Type 2 diabetes mellitus with hyperglycemia, with long-term current use of insulin (H)        Dose:  20 Units   Inject 20 Units Subcutaneous every evening   Quantity:  3 mL   Refills:  11       isosorbide mononitrate 30 MG 24 hr tablet   Commonly known as:  IMDUR   Used for:  Acute systolic heart failure (H)        Dose:  30 mg   Take 1 tablet (30 mg) by mouth daily   Quantity:  30 tablet   Refills:  11       metFORMIN 500 MG tablet   Commonly known as:  GLUCOPHAGE        Dose:  500 mg   Take 500 mg by mouth 2 times daily (with meals)   Refills:  0       metoprolol succinate ER 25 MG 24 hr tablet   Commonly known as:  TOPROL-XL        Dose:  12.5 mg   Take 0.5 tablets (12.5 mg) by mouth 2 times daily   Quantity:  30 tablet   Refills:  11       NITROSTAT 0.4 MG sublingual tablet   Generic drug:  nitroGLYcerin        Dose:  0.4 mg   Place 0.4 mg under the tongue every 5 minutes as needed for chest pain For chest pain place 1 tablet under the tongue every 5 minutes for 3 doses. If symptoms persist 5 minutes after 1st dose call 911.   Refills:  0       rosuvastatin 20 MG tablet   Commonly known as:  CRESTOR   Used for:  Acute systolic heart failure (H)        Dose:  20 mg   Take 1 tablet (20 mg) by mouth At Bedtime   Quantity:  30 tablet   Refills:  11       warfarin 2 MG tablet   Commonly known as:  COUMADIN        Dose:  4 mg   Take 4 mg by mouth daily On 7/1 and 7/2, then recheck INR at [facility] and take as directed   Refills:  0                Protect others around you: Learn how to safely use, store and throw away your medicines at www.disposemymeds.org.             Medication List: This is a list of all your medications and when to take them. Check marks below indicate your daily home schedule. Keep this list as a reference.      Medications           Morning Afternoon Evening Bedtime As Needed    amiodarone 200 MG tablet   Commonly known as:  PACERONE/CODARONE   Take 1 tablet (200  mg) by mouth daily                                clopidogrel 75 MG tablet   Commonly known as:  PLAVIX   Take 75 mg by mouth daily                                digoxin 125 MCG tablet   Commonly known as:  LANOXIN   Take 1 tablet (125 mcg) by mouth daily                                famotidine 20 MG tablet   Commonly known as:  PEPCID   Take 20 mg by mouth 2 times daily                                furosemide 40 MG tablet   Commonly known as:  LASIX   Take 1 tablet (40 mg) by mouth 2 times daily                                hydrALAZINE 50 MG tablet   Commonly known as:  APRESOLINE   Take 1 tablet (50 mg) by mouth 3 times daily                                insulin aspart 100 UNIT/ML pen   Commonly known as:  NovoLOG PEN   Less than 120 mg/dL = 0 Units 120-149 mg/dL = 1 Unit 150-199 mg/dL = 2 Units 200-249 mg/dL = 3 Units 250-299 mg/dL = 5 Units 300-349 mg/dL = 7 Units 350 mg/dL or greater = 8 Units                                insulin glargine 100 UNIT/ML pen   Commonly known as:  LANTUS PEN   Inject 20 Units Subcutaneous every evening                                isosorbide mononitrate 30 MG 24 hr tablet   Commonly known as:  IMDUR   Take 1 tablet (30 mg) by mouth daily                                metFORMIN 500 MG tablet   Commonly known as:  GLUCOPHAGE   Take 500 mg by mouth 2 times daily (with meals)                                metoprolol succinate ER 25 MG 24 hr tablet   Commonly known as:  TOPROL-XL   Take 0.5 tablets (12.5 mg) by mouth 2 times daily                                NITROSTAT 0.4 MG sublingual tablet   Place 0.4 mg under the tongue every 5 minutes as needed for chest pain For chest pain place 1 tablet under the tongue every 5 minutes for 3 doses. If symptoms persist 5 minutes after 1st dose call 911.   Generic drug:  nitroGLYcerin                                rosuvastatin 20 MG tablet   Commonly known as:  CRESTOR   Take 1 tablet (20 mg) by mouth At Bedtime                                 warfarin 2 MG tablet   Commonly known as:  COUMADIN   Take 4 mg by mouth daily On 7/1 and 7/2, then recheck INR at [facility] and take as directed

## 2018-12-07 NOTE — OR NURSING
Dr. Mercado at bedside spoke with pt and pt's brother about procedure and follow up; verbalized for pt to resume coumadin tonight.   Pt and pt's brother verbalized understanding of all and deny questions at this time.

## 2018-12-07 NOTE — DISCHARGE INSTRUCTIONS
[facility]  Same-Day Surgery   Adult Discharge Orders & Instructions     - Restart Coumadin today 18 at previous dose.   For 24 hours after surgery    1. Get plenty of rest.  A responsible adult must stay with you for at least 24 hours after you leave the hospital.   2. Do not drive or use heavy equipment.  If you have weakness or tingling, don't drive or use heavy equipment until this feeling goes away.  3. Do not drink alcohol.  4. Avoid strenuous or risky activities.  Ask for help when climbing stairs.   5. You may feel lightheaded.  IF so, sit for a few minutes before standing.  Have someone help you get up.   6. If you have nausea (feel sick to your stomach): Drink only clear liquids such as apple juice, ginger ale, broth or 7-Up.  Rest may also help.  Be sure to drink enough fluids.  Move to a regular diet as you feel able.  7. You may have a slight fever. Call the doctor if your fever is over 100 F (37.7 C) (taken under the tongue) or lasts longer than 24 hours.  8. You may have a dry mouth, a sore throat, muscle aches or trouble sleeping.  These should go away after 24 hours.  9. Do not make important or legal decisions.   Call your doctor for any of the followin.  Signs of infection (fever, growing tenderness at the surgery site, a large amount of drainage or bleeding, severe pain, foul-smelling drainage, redness, swelling).    2. It has been over 8 to 10 hours since surgery and you are still not able to urinate (pass water).    3.  Headache for over 24 hours.    4.  Numbness, tingling or weakness the day after surgery (if you had spinal anesthesia).  To contact a doctor, call Dr Jain's office at [phone] or:      [phone] and ask for the resident on call for Gastroenterology (answered 24 hours a day)      Emergency Department:  [facility]: [phone]       (TTY for hearing impaired: [phone])

## 2018-12-07 NOTE — OR NURSING
Dr. Youssef notified of BG = 214; verbalized no orders at this time, okay to discharge patient to home.

## 2018-12-07 NOTE — ANESTHESIA CARE TRANSFER NOTE
Patient: Mariusz Sharp    Procedure(s):  COLONOSCOPY  COMBINED COLONOSCOPY, REMOVE /POLYP/LESION BY SNARE    Diagnosis: Special screening for malignant neoplasms, colon  Diagnosis Additional Information: No value filed.    Anesthesia Type:   No value filed.     Note:  Airway :Room Air  Patient transferred to:Phase II  Comments: VSS, report to Rn,, BG pending in phase 2, interacting with staff Handoff Report: Identifed the Patient, Identified the Reponsible Provider, Reviewed the pertinent medical history, Discussed the surgical course, Reviewed Intra-OP anesthesia mangement and issues during anesthesia, Set expectations for post-procedure period and Allowed opportunity for questions and acknowledgement of understanding      Vitals: (Last set prior to Anesthesia Care Transfer)    CRNA VITALS  12/7/2018 1413 - 12/7/2018 1447      12/7/2018             Pulse: 61    Ht Rate: 61    SpO2: 99 %                Electronically Signed By: CARMEN Mata CRNA  December 7, 2018  2:47 PM

## 2018-12-07 NOTE — ANESTHESIA PREPROCEDURE EVALUATION
Anesthesia Pre-Procedure Evaluation    Patient: Mariusz Sharp   MRN:     [id number] Gender:   male   Age:    58 year old :      1960        Preoperative Diagnosis: Special screening for malignant neoplasms, colon   Procedure(s):  COLONOSCOPY     Past Medical History:   Diagnosis Date     Acute kidney injury (H)      CKD (chronic kidney disease)      Coronary artery disease      Diabetes mellitus (H)      HTN (hypertension)      Hyperlipidemia      Ischemic cardiomyopathy      Paroxysmal atrial flutter (H)      Systolic heart failure (H)      Ventricular tachycardia (H)       Past Surgical History:   Procedure Laterality Date     HC PRQ TRLUML CORONARY ANGIOPLASTY ADDL BRANCH      PCI and ALYSSA to ostial LAD on 18     IMPLANT AUTOMATIC IMPLANTABLE CARDIOVERTER DEFIBRILLATOR            Anesthesia Evaluation     . Pt has had prior anesthetic. Type: General    No history of anesthetic complications          ROS/MED HX    ENT/Pulmonary:  - neg pulmonary ROS     Neurologic:  - neg neurologic ROS     Cardiovascular: Comment: Needs LVAD    (+) hypertension--CAD, -past MI,-stent,. : . CHF etiology: ischemic Last EF: 20-25 . . :ICD . dysrhythmias .       METS/Exercise Tolerance:     Hematologic:         Musculoskeletal:         GI/Hepatic:         Renal/Genitourinary:     (+) chronic renal disease, type: CRI,       Endo:     (+) type I DM, Using insulin .      Psychiatric:  - neg psychiatric ROS       Infectious Disease:         Malignancy:         Other:                         PHYSICAL EXAM:   Mental Status/Neuro: A/A/O   Airway: Facies: Feasible  Mallampati: II  Mouth/Opening: Full  TM distance: > 6 cm  Neck ROM: Full   Respiratory: Auscultation: CTAB     Resp. Rate: Normal     Resp. Effort: Normal      CV: Rhythm: Regular  Rate: Age appropriate  Heart: Normal Sounds   Comments:      Dental:  Dental Comments: Chipped left front tooth                Lab Results   Component Value Date    WBC 6.1 2018     "HGB 9.9 (L) 07/24/2018    HCT 32.6 (L) 07/24/2018     07/24/2018    CRP 11.0 (H) 07/23/2018     11/13/2018    POTASSIUM 4.1 11/13/2018    CHLORIDE 102 11/13/2018    CO2 25 11/13/2018    BUN 36 (H) 11/13/2018    CR 1.59 (H) 11/13/2018     (H) 11/13/2018    ARLENE 8.9 11/13/2018    PHOS 5.4 (H) 07/17/2018    MAG 2.1 07/24/2018    ALBUMIN 3.6 10/17/2018    PROTTOTAL 7.1 10/17/2018    ALT 34 10/17/2018    AST 25 10/17/2018    ALKPHOS 59 10/17/2018    BILITOTAL 0.7 10/17/2018    PTT 59 (H) 07/17/2018    INR 1.46 (H) 08/06/2018    TSH 6.91 (H) 07/23/2018    T4 1.05 07/23/2018       Preop Vitals  BP Readings from Last 3 Encounters:   11/13/18 (!) 143/97   11/13/18 128/88   10/17/18 91/59    Pulse Readings from Last 3 Encounters:   11/13/18 61   10/17/18 57   10/03/18 64      Resp Readings from Last 3 Encounters:   11/13/18 18   07/24/18 16    SpO2 Readings from Last 3 Encounters:   11/13/18 97%   11/13/18 98%   10/17/18 98%      Temp Readings from Last 1 Encounters:   11/13/18 36.2  C (97.1  F) (Oral)    Ht Readings from Last 1 Encounters:   11/13/18 1.626 m (5' 4\")      Wt Readings from Last 1 Encounters:   11/13/18 79.4 kg (175 lb 0.7 oz)    Estimated body mass index is 30.05 kg/(m^2) as calculated from the following:    Height as of 11/13/18: 1.626 m (5' 4\").    Weight as of 11/13/18: 79.4 kg (175 lb 0.7 oz).     LDA:            Assessment:   ASA SCORE: 3       Documentation: H&P complete; Preop Testing complete; Consents complete   Proceeding: Proceed without further delay  Tobacco Use:  NO Active use of Tobacco/UNKNOWN Tobacco use status     Plan:   Anes. Type:  MAC   Pre-Induction: Midazolam IV   Induction:  IV (Standard)   Airway: Native Airway   Access/Monitoring: PIV   Maintenance: Propofol; IV   Emergence: Procedure Site   Logistics: Same Day Surgery     Postop Pain/Sedation Strategy:  Standard-Options: Opioids PRN     PONV Management:  Adult Risk Factors:, Non-Smoker, Postop Opioids  Prevention: " Propofol Infusion     CONSENT: Direct conversation   Plan and risks discussed with: Patient   Blood Products: Consent Deferred (Minimal Blood Loss)                         Tram Youssef MD

## 2018-12-07 NOTE — ANESTHESIA POSTPROCEDURE EVALUATION
Anesthesia POST Procedure Evaluation    Patient: Mariusz Sharp   MRN:     [id number] Gender:   male   Age:    58 year old :      1960        Preoperative Diagnosis: Special screening for malignant neoplasms, colon   Procedure(s):  COLONOSCOPY  COMBINED COLONOSCOPY, REMOVE /POLYP/LESION BY SNARE   Postop Comments: No value filed.       Anesthesia Type:  MAC    Reportable Event: NO     PAIN: Uncomplicated   Sign Out status: Comfortable, Well controlled pain     PONV: No PONV   Sign Out status:  No Nausea or Vomiting     Neuro/Psych: Uneventful perioperative course   Sign Out Status: Preoperative baseline; Age appropriate mentation     Airway/Resp.: Uneventful perioperative course   Sign Out Status: Non labored breathing, age appropriate RR; Resp. Status within EXPECTED Parameters     CV: Uneventful perioperative course   Sign Out status: Appropriate BP and perfusion indices; Appropriate HR/Rhythm     Disposition:   Sign Out in:  PACU  Disposition:  Phase II; Home  Recovery Course: Uneventful  Follow-Up: Not required           Last Anesthesia Record Vitals:  CRNA VITALS  2018 1413 - 2018 1455      2018             Pulse: 61    Ht Rate: 61    SpO2: 99 %          Last PACU/Preop Vitals:  Vitals:    18 1157 18 1446   BP: 123/87 100/70   Resp:    Temp: 36.8  C (98.2  F) 36.4  C (97.6  F)   SpO2: 97%          Electronically Signed By: Tram Youssef MD, 2018, 2:55 PM

## 2018-12-11 LAB — COPATH REPORT: NORMAL

## 2018-12-13 ENCOUNTER — MEDICAL CORRESPONDENCE (OUTPATIENT)
Dept: HEALTH INFORMATION MANAGEMENT | Facility: CLINIC | Age: 58
End: 2018-12-13

## 2018-12-14 NOTE — PROGRESS NOTES
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 He denies any further episode of exertional N/V.  He denies fever, chills, chest pain, palpitations, SOB, change in RADER, PND, orthopnea, vomiting, diarrhea, hematochezia, melena, or LE edema however admits that his exercise capacity and overall energy have declined and his quality of life as deteriorated to point that he is ready to proceed with LVAD placement as soon as evaluation completed.      PAST MEDICAL HISTORY:  Past Medical History:   Diagnosis Date     Acute kidney injury (H)      CKD (chronic kidney disease)      Coronary artery disease      Diabetes mellitus (H)      HTN (hypertension)      Hyperlipidemia      Ischemic cardiomyopathy      Paroxysmal atrial flutter (H)      Systolic heart failure (H)      Ventricular tachycardia (H)        FAMILY HISTORY:  Reviewed    SOCIAL HISTORY:  Social History     Social History     Marital status: Single     Spouse name: N/A     Number of children: N/A     Years of education: N/A     Social History Main Topics     Smoking status: Never Smoker     Smokeless tobacco: Never Used     Alcohol use Not on file     Drug use: Not on file     Sexual activity: Not on file     Other Topics Concern     Not on file     Social History Narrative       CURRENT MEDICATIONS:  Outpatient Medications Prior to Visit   Medication Sig Dispense Refill     amiodarone (PACERONE/CODARONE) 200 MG tablet Take 1 tablet (200 mg) by mouth daily 60 tablet 11     clopidogrel (PLAVIX) 75 MG tablet Take 75 mg by mouth daily       digoxin (LANOXIN) 125 MCG tablet Take 1 tablet (125 mcg) by mouth daily 30 tablet 11     famotidine (PEPCID) 20 MG tablet Take 20 mg by mouth 2 times daily       hydrALAZINE (APRESOLINE) 50 MG tablet Take 1 tablet  (50 mg) by mouth 3 times daily 120 tablet 11     insulin aspart (NOVOLOG PEN) 100 UNIT/ML injection Less than 120 mg/dL = 0 Units  120-149 mg/dL = 1 Unit  150-199 mg/dL = 2 Units  200-249 mg/dL = 3 Units  250-299 mg/dL = 5 Units  300-349 mg/dL = 7 Units  350 mg/dL or greater = 8 Units       insulin glargine (LANTUS SOLOSTAR) 100 UNIT/ML pen Inject 20 Units Subcutaneous every evening 3 mL 11     isosorbide mononitrate (IMDUR) 30 MG 24 hr tablet Take 1 tablet (30 mg) by mouth daily 30 tablet 11     metFORMIN (GLUCOPHAGE) 500 MG tablet Take 500 mg by mouth 2 times daily (with meals)       metoprolol succinate (TOPROL-XL) 25 MG 24 hr tablet Take 0.5 tablets (12.5 mg) by mouth 2 times daily 30 tablet 11     nitroGLYcerin (NITROSTAT) 0.4 MG sublingual tablet Place 0.4 mg under the tongue every 5 minutes as needed for chest pain For chest pain place 1 tablet under the tongue every 5 minutes for 3 doses. If symptoms persist 5 minutes after 1st dose call 911.       rosuvastatin (CRESTOR) 20 MG tablet Take 1 tablet (20 mg) by mouth At Bedtime 30 tablet 11     warfarin (COUMADIN) 2 MG tablet Take 4 mg by mouth daily On 7/1 and 7/2, then recheck INR at [facility] and take as directed       furosemide (LASIX) 20 MG tablet Take 1 tablet (20 mg) by mouth 2 times daily 180 tablet 3     No facility-administered medications prior to visit.        ROS:   CONSTITUTIONAL: Denies fever, chills, fatigue, or weight fluctuations.   HEENT: Denies headache and changes in speech. He complains of vision changes.   CV: Refer to HPI.   PULMONARY:Denies shortness of breath, cough, or previous TB exposure.   GI:Denies nausea, vomiting, diarrhea, and abdominal pain. Bowel movements are regular.   :See HPI  EXT:Denies lower extremity edema.   SKIN:Denies abnormal rashes or lesions.   MUSCULOSKELETAL:Denies upper or lower extremity weakness and pain.   NEUROLOGIC:Denies lightheadedness, dizziness, seizures, or upper or lower  "extremity paresthesia.     EXAM:  /88 (BP Location: Left arm, Patient Position: Chair, Cuff Size: Adult Regular)   Pulse 61   Ht 1.626 m (5' 4\")   Wt 79.4 kg (175 lb)   SpO2 98%   BMI 30.04 kg/m    GENERAL: Appears alert and oriented times three.   HEENT: Normocephalic, atraumatic.  Mucous membranes moist and without lesions.  NECK: Supple and without lymphadenopathy. JVD ~10cm.  CV: RRR, S1S2 present without murmur, rub, or gallop.   RESPIRATORY: Respirations regular, even, and unlabored. Lungs CTA throughout.   GI: Soft and non distended with normoactive bowel sounds present in all quadrants. No tenderness, rebound, guarding. No organomegaly.    EXTREMITIES: No peripheral edema. 2+ bilateral pedal pulses.   NEUROLOGIC: Alert and orientated x 3. CN II-XII grossly intact. No focal deficits.   MUSCULOSKELETAL: No joint swelling or tenderness.   SKIN: No jaundice. No rashes or lesions.     Labs:  Reviewed     Diagnostics:  Recent Results (from the past 4320 hour(s))   ECHO COMPLETE WITH OPTISON    Narrative    [id number]  [id number]  [id number]  [id number]^STEFANIA^MANNY^V           [facility],[place]  Echocardiography Laboratory  [address] [place] [address]     Name: ADILSON RINCON  MRN: [id number]  : 1960  Study Date: 2018 11:27 AM  Age: 57 yrs  Gender: Male  Patient Location: [facility] [place]  Reason For Study: Cardiac Arrest  Ordering Physician: MANNY AGUAIR  Referring Physician: SELF, REFERRED  Performed By: Laya Read RDCS     BSA: 1.8 m2  Height: 64 in  Weight: 170 lb  HR: 78  BP: 99/76 mmHg  _____________________________________________________________________________  __        Procedure  Echocardiogram with two-dimensional, color and spectral Doppler performed.  Contrast Optison. Optison (NDC #9728-1739-55) given intravenously. Patient was  given 5 ml mixture of 3 ml Optison and 6 ml saline. 4 ml " wasted.  _____________________________________________________________________________  __        Interpretation Summary  Left ventricular wall thickness is normal. Borderline left ventricular  dilation is present. LVIDd is 5.19cm The Ejection Fraction is estimated at 20-  25%. The mid to distal anteroseptum, mid to distal anterior wall, mid to  distal anterolateral wall and mid to apical septum and apex are akinetic. This  is consistent with large LAD territory infarct. Laminar thrombus is seen in  the LV apex  The right ventricle is normal size. Difficult to evaluate RV function but it  appears to be mild to moderately reduced.  Moderate tricuspid insufficiency is present. Right ventricular systolic  pressure is 26mmHg above the right atrial pressure.  Dilation of the inferior vena cava is present with normal respiratory  variation in diameter. Estimated mean right atrial pressure is 8 mmHg (mildly  elevated).  No pericardial effusion is present.     Previous study not available for comparison.  _____________________________________________________________________________  __        Left Ventricle  Left ventricular wall thickness is normal. Borderline left ventricular  dilation is present. LVIDd is 5.19cm. Grade III or advanced diastolic  dysfunction. The Ejection Fraction is estimated at 20-25%. The mid to distal  anteroseptum, mid to distal anterior  wall, mid to distal anterolateral wall and mid to apical septum and apex are  akinetic. This is consistent with large LAD territory infarct. Laminar  thrombus is seen in the LV apex.     Right Ventricle  Right ventricular wall thickness is normal. The right ventricle is normal  size. Difficult to evaluate RV function but it appears to be mild to  moderately reduced. A pacemaker lead is noted in the right ventricle.     Atria  Moderate left atrial enlargement is present. Moderate right atrial enlargement  is present. A pacemaker lead is noted in the right atrium.      Mitral Valve  The mitral valve is normal. Trace mitral insufficiency is present.        Aortic Valve  Aortic valve is normal in structure and function. The aortic valve is  tricuspid.     Tricuspid Valve  The tricuspid valve is normal. Moderate tricuspid insufficiency is present.  The right ventricular systolic pressure is approximated at 26.0 mmHg plus the  right atrial pressure. Right ventricular systolic pressure is 26mmHg above the  right atrial pressure.     Pulmonic Valve  The pulmonic valve is normal. Trace pulmonic insufficiency is present.     Vessels  The aorta root is normal. Dilation of the inferior vena cava is present with  normal respiratory variation in diameter. Estimated mean right atrial pressure  is 8 mmHg (mildly elevated).     Pericardium  No pericardial effusion is present.        Compared to Previous Study  Previous study not available for comparison.  _____________________________________________________________________________  __  MMode/2D Measurements & Calculations  IVSd: 0.74 cm     LVIDd: 5.2 cm  LVIDs: 4.0 cm  LVPWd: 1.0 cm  FS: 23.3 %  LV mass(C)d: 167.6 grams  LV mass(C)dI: 91.8 grams/m2  asc Aorta Diam: 3.6 cm  LVOT diam: 2.1 cm  LVOT area: 3.5 cm2  LA Volume (BP): 81.1 ml  LA Volume Index (BP): 44.3 ml/m2  RWT: 0.40           Doppler Measurements & Calculations  MV E max sasha: 101.0 cm/sec  MV A max sasha: 16.5 cm/sec  MV E/A: 6.1  MV dec slope: 660.0 cm/sec2  PA V2 max: 96.5 cm/sec  PA max PG: 3.7 mmHg  PA acc time: 0.11 sec  TR max sasha: 255.0 cm/sec  TR max P.0 mmHg  E/E' av.8  Lateral E/e': 11.0  Medial E/e': 34.5     _____________________________________________________________________________  __           Report approved by: LEÓN Powell 2018 02:05 PM                           Assessment and Plan: 58 year old male with a past medical history including CAD (s/p STEMI with ALYSSA to LAD 18), 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    Chronic systolic heart failure secondary to ICM.    Stage C, NYHA Class IV  ACEi/ARB: no given CRI; Will continue Hydralazine 50 mg po TID. Defer increase occasional symptomatic episodes c/w hypotension  BB Toprol XL 12.5 mg po BID.  Defer increase occasional symptomatic episodes c/w hypotension as well as low cardiac output  Aldosterone antagonist contraindicated due to renal dysfunction  SCD prophylaxis ICD.   Pt ready to proceed with LVAD as soon as able.  RHC today confirms los cardiac output and elevated PCWP.  Will continue to complete LVAD eval - colonoscopy and dental ASAP.    CAD. s/p STEMI with ALYSSA to LAD 6/27/18  - Continue Crestor, Plavix, and Toprol XL.     PAF. CHADSVASC-4.   - Coumadin per AC.   - Continue Toprol XL.     VT/VF. With cardiac arrest.   - No recent ICD shocks  - Last device interrogation with no evidence of arrhythmias since prior shocks7/16/18.      LV thrombus.   - Coumadin as above.     CKD Stage III.   - Grossly stable    Follow-up:  Once LVAD evaluation completed will proceed with LVAD placement.    Jenni Ortiz MD  Section Head - Advanced Heart Failure, Transplantation and Mechanical Circulatory Support  Co-Director - [facility]ovascular [facility]  Associate Professor of Medicine, [facility]

## 2018-12-17 ENCOUNTER — HOSPITAL ENCOUNTER (INPATIENT)
Facility: CLINIC | Age: 58
Setting detail: SURGERY ADMIT
End: 2018-12-17
Attending: SURGERY | Admitting: SURGERY
Payer: MEDICAID

## 2018-12-20 LAB — HBA1C MFR BLD: 10.6 % (ref 0–5.6)

## 2018-12-26 ENCOUNTER — ANESTHESIA EVENT (OUTPATIENT)
Dept: SURGERY | Facility: CLINIC | Age: 58
End: 2018-12-26
Payer: MEDICAID

## 2018-12-27 ENCOUNTER — CARE COORDINATION (OUTPATIENT)
Dept: CARDIOLOGY | Facility: CLINIC | Age: 58
End: 2018-12-27

## 2018-12-27 NOTE — PROGRESS NOTES
D:  Placed call to pt to check in and verify time for admission on Saturday and to answer any questions patient had.  I:  Left message for patient to call back.  P:  VAD coordinator available for questions or concerns.

## 2018-12-28 ENCOUNTER — TEAM CONFERENCE (OUTPATIENT)
Dept: CARDIOLOGY | Facility: CLINIC | Age: 58
End: 2018-12-28

## 2018-12-29 ENCOUNTER — HOSPITAL ENCOUNTER (INPATIENT)
Facility: CLINIC | Age: 58
LOS: 13 days | Discharge: NURSING, REHAB OR LONG-TERM CARE | End: 2019-01-11
Attending: INTERNAL MEDICINE | Admitting: INTERNAL MEDICINE
Payer: MEDICAID

## 2018-12-29 DIAGNOSIS — I50.21 ACUTE SYSTOLIC HEART FAILURE (H): ICD-10-CM

## 2018-12-29 DIAGNOSIS — I50.23 ACUTE ON CHRONIC SYSTOLIC HEART FAILURE (H): ICD-10-CM

## 2018-12-29 DIAGNOSIS — Z95.811 LVAD (LEFT VENTRICULAR ASSIST DEVICE) PRESENT (H): Primary | ICD-10-CM

## 2018-12-29 DIAGNOSIS — I50.22 CHRONIC SYSTOLIC HEART FAILURE (H): ICD-10-CM

## 2018-12-29 LAB
ALBUMIN SERPL-MCNC: 3.6 G/DL (ref 3.4–5)
ALP SERPL-CCNC: 36 U/L (ref 40–150)
ALT SERPL W P-5'-P-CCNC: 26 U/L (ref 0–70)
ANION GAP SERPL CALCULATED.3IONS-SCNC: 10 MMOL/L (ref 3–14)
AST SERPL W P-5'-P-CCNC: 19 U/L (ref 0–45)
BILIRUB SERPL-MCNC: 1 MG/DL (ref 0.2–1.3)
BUN SERPL-MCNC: 48 MG/DL (ref 7–30)
CALCIUM SERPL-MCNC: 8.4 MG/DL (ref 8.5–10.1)
CHLORIDE SERPL-SCNC: 104 MMOL/L (ref 94–109)
CO2 SERPL-SCNC: 22 MMOL/L (ref 20–32)
CREAT SERPL-MCNC: 1.55 MG/DL (ref 0.66–1.25)
ERYTHROCYTE [DISTWIDTH] IN BLOOD BY AUTOMATED COUNT: 14.5 % (ref 10–15)
ERYTHROCYTE [DISTWIDTH] IN BLOOD BY AUTOMATED COUNT: 14.6 % (ref 10–15)
GFR SERPL CREATININE-BSD FRML MDRD: 49 ML/MIN/{1.73_M2}
GLUCOSE BLDC GLUCOMTR-MCNC: 171 MG/DL (ref 70–99)
GLUCOSE SERPL-MCNC: 228 MG/DL (ref 70–99)
HCT VFR BLD AUTO: 38.1 % (ref 40–53)
HCT VFR BLD AUTO: 39.8 % (ref 40–53)
HGB BLD-MCNC: 12.2 G/DL (ref 13.3–17.7)
HGB BLD-MCNC: 12.7 G/DL (ref 13.3–17.7)
INR PPP: 1.08 (ref 0.86–1.14)
MCH RBC QN AUTO: 28 PG (ref 26.5–33)
MCH RBC QN AUTO: 28.1 PG (ref 26.5–33)
MCHC RBC AUTO-ENTMCNC: 31.9 G/DL (ref 31.5–36.5)
MCHC RBC AUTO-ENTMCNC: 32 G/DL (ref 31.5–36.5)
MCV RBC AUTO: 87 FL (ref 78–100)
MCV RBC AUTO: 88 FL (ref 78–100)
NT-PROBNP SERPL-MCNC: 1232 PG/ML (ref 0–900)
PLATELET # BLD AUTO: 206 10E9/L (ref 150–450)
PLATELET # BLD AUTO: 222 10E9/L (ref 150–450)
POTASSIUM SERPL-SCNC: 3.7 MMOL/L (ref 3.4–5.3)
PROT SERPL-MCNC: 6.9 G/DL (ref 6.8–8.8)
RBC # BLD AUTO: 4.36 10E12/L (ref 4.4–5.9)
RBC # BLD AUTO: 4.52 10E12/L (ref 4.4–5.9)
SODIUM SERPL-SCNC: 137 MMOL/L (ref 133–144)
TROPONIN I SERPL-MCNC: 0.05 UG/L (ref 0–0.04)
WBC # BLD AUTO: 5.4 10E9/L (ref 4–11)
WBC # BLD AUTO: 6.4 10E9/L (ref 4–11)

## 2018-12-29 PROCEDURE — 83880 ASSAY OF NATRIURETIC PEPTIDE: CPT

## 2018-12-29 PROCEDURE — 99222 1ST HOSP IP/OBS MODERATE 55: CPT | Mod: 25 | Performed by: INTERNAL MEDICINE

## 2018-12-29 PROCEDURE — 84484 ASSAY OF TROPONIN QUANT: CPT

## 2018-12-29 PROCEDURE — 85610 PROTHROMBIN TIME: CPT

## 2018-12-29 PROCEDURE — 25000132 ZZH RX MED GY IP 250 OP 250 PS 637

## 2018-12-29 PROCEDURE — 40000141 ZZH STATISTIC PERIPHERAL IV START W/O US GUIDANCE

## 2018-12-29 PROCEDURE — 80053 COMPREHEN METABOLIC PANEL: CPT

## 2018-12-29 PROCEDURE — 36415 COLL VENOUS BLD VENIPUNCTURE: CPT

## 2018-12-29 PROCEDURE — 85027 COMPLETE CBC AUTOMATED: CPT

## 2018-12-29 PROCEDURE — 00000146 ZZHCL STATISTIC GLUCOSE BY METER IP

## 2018-12-29 PROCEDURE — 93005 ELECTROCARDIOGRAM TRACING: CPT

## 2018-12-29 PROCEDURE — 93010 ELECTROCARDIOGRAM REPORT: CPT | Performed by: INTERNAL MEDICINE

## 2018-12-29 PROCEDURE — 25000131 ZZH RX MED GY IP 250 OP 636 PS 637

## 2018-12-29 PROCEDURE — 25000128 H RX IP 250 OP 636

## 2018-12-29 PROCEDURE — 21400006 ZZH R&B CCU INTERMEDIATE UMMC

## 2018-12-29 RX ORDER — FUROSEMIDE 20 MG
40 TABLET ORAL 2 TIMES DAILY
Status: DISCONTINUED | OUTPATIENT
Start: 2018-12-29 | End: 2018-12-31

## 2018-12-29 RX ORDER — AMOXICILLIN 250 MG
2 CAPSULE ORAL 2 TIMES DAILY
Status: DISCONTINUED | OUTPATIENT
Start: 2018-12-29 | End: 2018-12-31

## 2018-12-29 RX ORDER — NICOTINE POLACRILEX 4 MG
15-30 LOZENGE BUCCAL
Status: DISCONTINUED | OUTPATIENT
Start: 2018-12-29 | End: 2018-12-31

## 2018-12-29 RX ORDER — AMOXICILLIN 250 MG
2 CAPSULE ORAL 2 TIMES DAILY PRN
Status: DISCONTINUED | OUTPATIENT
Start: 2018-12-29 | End: 2019-01-11 | Stop reason: HOSPADM

## 2018-12-29 RX ORDER — AMOXICILLIN 250 MG
1 CAPSULE ORAL 2 TIMES DAILY
Status: DISCONTINUED | OUTPATIENT
Start: 2018-12-29 | End: 2018-12-31

## 2018-12-29 RX ORDER — BISACODYL 10 MG
10 SUPPOSITORY, RECTAL RECTAL DAILY PRN
Status: DISCONTINUED | OUTPATIENT
Start: 2018-12-29 | End: 2018-12-31

## 2018-12-29 RX ORDER — ISOSORBIDE MONONITRATE 30 MG/1
30 TABLET, EXTENDED RELEASE ORAL DAILY
Status: DISCONTINUED | OUTPATIENT
Start: 2018-12-29 | End: 2018-12-31

## 2018-12-29 RX ORDER — FAMOTIDINE 20 MG/1
20 TABLET, FILM COATED ORAL 2 TIMES DAILY
Status: DISCONTINUED | OUTPATIENT
Start: 2018-12-29 | End: 2018-12-31

## 2018-12-29 RX ORDER — ASPIRIN 81 MG/1
324 TABLET, CHEWABLE ORAL ONCE
Status: DISCONTINUED | OUTPATIENT
Start: 2018-12-29 | End: 2018-12-29 | Stop reason: CLARIF

## 2018-12-29 RX ORDER — AMOXICILLIN 250 MG
1 CAPSULE ORAL 2 TIMES DAILY PRN
Status: DISCONTINUED | OUTPATIENT
Start: 2018-12-29 | End: 2019-01-11 | Stop reason: HOSPADM

## 2018-12-29 RX ORDER — CLOPIDOGREL BISULFATE 75 MG/1
75 TABLET ORAL DAILY
Status: DISCONTINUED | OUTPATIENT
Start: 2018-12-29 | End: 2018-12-29 | Stop reason: CLARIF

## 2018-12-29 RX ORDER — DEXTROSE MONOHYDRATE 25 G/50ML
25-50 INJECTION, SOLUTION INTRAVENOUS
Status: DISCONTINUED | OUTPATIENT
Start: 2018-12-29 | End: 2018-12-31

## 2018-12-29 RX ORDER — DIGOXIN 125 MCG
125 TABLET ORAL DAILY
Status: DISCONTINUED | OUTPATIENT
Start: 2018-12-30 | End: 2019-01-11 | Stop reason: HOSPADM

## 2018-12-29 RX ORDER — LIDOCAINE 40 MG/G
CREAM TOPICAL
Status: DISCONTINUED | OUTPATIENT
Start: 2018-12-29 | End: 2018-12-31

## 2018-12-29 RX ORDER — NITROGLYCERIN 0.4 MG/1
0.4 TABLET SUBLINGUAL EVERY 5 MIN PRN
Status: DISCONTINUED | OUTPATIENT
Start: 2018-12-29 | End: 2019-01-11 | Stop reason: HOSPADM

## 2018-12-29 RX ORDER — HEPARIN SODIUM 10000 [USP'U]/100ML
0-3500 INJECTION, SOLUTION INTRAVENOUS CONTINUOUS
Status: DISCONTINUED | OUTPATIENT
Start: 2018-12-29 | End: 2018-12-30

## 2018-12-29 RX ORDER — ACETAMINOPHEN 325 MG/1
650 TABLET ORAL EVERY 4 HOURS PRN
Status: DISCONTINUED | OUTPATIENT
Start: 2018-12-29 | End: 2018-12-31

## 2018-12-29 RX ORDER — HYDRALAZINE HYDROCHLORIDE 25 MG/1
50 TABLET, FILM COATED ORAL 3 TIMES DAILY
Status: DISCONTINUED | OUTPATIENT
Start: 2018-12-29 | End: 2018-12-31

## 2018-12-29 RX ORDER — ROSUVASTATIN CALCIUM 20 MG/1
20 TABLET, COATED ORAL AT BEDTIME
Status: DISCONTINUED | OUTPATIENT
Start: 2018-12-29 | End: 2019-01-02

## 2018-12-29 RX ORDER — ALUMINA, MAGNESIA, AND SIMETHICONE 2400; 2400; 240 MG/30ML; MG/30ML; MG/30ML
30 SUSPENSION ORAL EVERY 4 HOURS PRN
Status: DISCONTINUED | OUTPATIENT
Start: 2018-12-29 | End: 2019-01-11 | Stop reason: HOSPADM

## 2018-12-29 RX ORDER — ACETAMINOPHEN 650 MG/1
650 SUPPOSITORY RECTAL EVERY 4 HOURS PRN
Status: DISCONTINUED | OUTPATIENT
Start: 2018-12-29 | End: 2018-12-31

## 2018-12-29 RX ORDER — HEPARIN SODIUM 5000 [USP'U]/.5ML
5000 INJECTION, SOLUTION INTRAVENOUS; SUBCUTANEOUS EVERY 12 HOURS
Status: DISCONTINUED | OUTPATIENT
Start: 2018-12-29 | End: 2018-12-29

## 2018-12-29 RX ORDER — AMIODARONE HYDROCHLORIDE 200 MG/1
200 TABLET ORAL DAILY
Status: DISCONTINUED | OUTPATIENT
Start: 2018-12-30 | End: 2019-01-11 | Stop reason: HOSPADM

## 2018-12-29 RX ADMIN — FAMOTIDINE 20 MG: 20 TABLET, FILM COATED ORAL at 20:28

## 2018-12-29 RX ADMIN — Medication 12.5 MG: at 20:28

## 2018-12-29 RX ADMIN — HEPARIN SODIUM 950 UNITS/HR: 10000 INJECTION, SOLUTION INTRAVENOUS at 19:17

## 2018-12-29 RX ADMIN — INSULIN GLARGINE 10 UNITS: 100 INJECTION, SOLUTION SUBCUTANEOUS at 20:28

## 2018-12-29 RX ADMIN — ROSUVASTATIN CALCIUM 20 MG: 20 TABLET, FILM COATED ORAL at 20:28

## 2018-12-29 RX ADMIN — ISOSORBIDE MONONITRATE 30 MG: 30 TABLET, EXTENDED RELEASE ORAL at 15:07

## 2018-12-29 RX ADMIN — INSULIN ASPART 1 UNITS: 100 INJECTION, SOLUTION INTRAVENOUS; SUBCUTANEOUS at 18:32

## 2018-12-29 RX ADMIN — HYDRALAZINE HYDROCHLORIDE 50 MG: 50 TABLET ORAL at 15:07

## 2018-12-29 RX ADMIN — FUROSEMIDE 40 MG: 40 TABLET ORAL at 17:34

## 2018-12-29 RX ADMIN — HYDRALAZINE HYDROCHLORIDE 50 MG: 50 TABLET ORAL at 20:28

## 2018-12-29 ASSESSMENT — ACTIVITIES OF DAILY LIVING (ADL)
AMBULATION: 0-->INDEPENDENT
ADLS_ACUITY_SCORE: 15
TRANSFERRING: 0-->INDEPENDENT
ADLS_ACUITY_SCORE: 10
SWALLOWING: 0-->SWALLOWS FOODS/LIQUIDS WITHOUT DIFFICULTY
RETIRED_COMMUNICATION: 0-->UNDERSTANDS/COMMUNICATES WITHOUT DIFFICULTY
RETIRED_EATING: 0-->INDEPENDENT
FALL_HISTORY_WITHIN_LAST_SIX_MONTHS: NO
BATHING: 0-->INDEPENDENT
DRESS: 0-->INDEPENDENT
COGNITION: 0 - NO COGNITION ISSUES REPORTED
TOILETING: 0-->INDEPENDENT
ADLS_ACUITY_SCORE: 10

## 2018-12-29 ASSESSMENT — COLUMBIA-SUICIDE SEVERITY RATING SCALE - C-SSRS
2. HAVE YOU ACTUALLY HAD ANY THOUGHTS OF KILLING YOURSELF IN THE PAST MONTH?: NO
1. IN THE PAST MONTH, HAVE YOU WISHED YOU WERE DEAD OR WISHED YOU COULD GO TO SLEEP AND NOT WAKE UP?: NO
6. HAVE YOU EVER DONE ANYTHING, STARTED TO DO ANYTHING, OR PREPARED TO DO ANYTHING TO END YOUR LIFE?: NO

## 2018-12-29 NOTE — H&P
Cardiology History and Physical    Patient Name: Mariusz Sharp MRN# [id number]   Age: 58 year old YOB: 1960     Date of Admission:12/29/2018  Primary care provider: Patricia Irving  Date of Service: 12/29/2018  Admitting Team: Lew FONG         Chief Complaint:   HFrEF, planned LVAD procedure         HPI:   58 year old male with a past medical history including CAD (s/p STEMI with ALYSSA to LAD 6/27/18), monomorphic VT s/p ICD shock times four 7/16/18, LV thrombus, CKD Stage III, paroxysmal AF, DM Type II, and ICM with EF 20-25% presents for placement of LVAD scheduled for Monday 12/31/18. He denies fever, chills, chest pain, palpitations, SOB, change in RADER, PND, orthopnea, vomiting, diarrhea, hematochezia, melena, or LE edema however his exercise capacity and overall energy have declined and his quality of life as deteriorated to point that he is ready to proceed with LVAD placement.         Past Medical History:     Past Medical History:   Diagnosis Date     Acute kidney injury (H)      CKD (chronic kidney disease)      Coronary artery disease      Diabetes mellitus (H)      HTN (hypertension)      Hyperlipidemia      Ischemic cardiomyopathy      Paroxysmal atrial flutter (H)      Systolic heart failure (H)      Ventricular tachycardia (H)        Last Cardiac Hospitalization:  7/16/2018, cardiogenic shock, VT/VF  Last ECHO: 7/16/18  Last Angiogram: 7/2/18         Past Surgical History:     Past Surgical History:   Procedure Laterality Date     COLONOSCOPY N/A 12/7/2018    Procedure: COLONOSCOPY;  Surgeon: Krish Mercado MD;  Location: [facility] CORONARY ANGIOPLASTY ADDL BRANCH      PCI and ALYSSA to ostial LAD on 6/27/18     IMPLANT AUTOMATIC IMPLANTABLE CARDIOVERTER DEFIBRILLATOR              Social History:     Social History     Socioeconomic History     Marital status: Single     Spouse name: Not on file     Number of children: Not on file     Years of education: Not on file     Highest  education level: Not on file   Social Needs     Financial resource strain: Not on file     Food insecurity - worry: Not on file     Food insecurity - inability: Not on file     Transportation needs - medical: Not on file     Transportation needs - non-medical: Not on file   Occupational History     Not on file   Tobacco Use     Smoking status: Never Smoker     Smokeless tobacco: Never Used   Substance and Sexual Activity     Alcohol use: Not on file     Drug use: Not on file     Sexual activity: Not on file   Other Topics Concern     Not on file   Social History Narrative     Not on file            Family History:   No family history on file.           Immunizations:   There is no immunization history for the selected administration types on file for this patient.           Allergies:    No Known Allergies         Medications:     Prior to Admission Medications   Prescriptions Last Dose Informant Patient Reported? Taking?   amiodarone (PACERONE/CODARONE) 200 MG tablet 12/29/2018 at Unknown time Self No Yes   Sig: Take 1 tablet (200 mg) by mouth daily   clopidogrel (PLAVIX) 75 MG tablet Past Week at Unknown time Self Yes Yes   Sig: Take 75 mg by mouth daily   digoxin (LANOXIN) 125 MCG tablet 12/29/2018 at Unknown time Self No Yes   Sig: Take 1 tablet (125 mcg) by mouth daily   famotidine (PEPCID) 20 MG tablet  Self Yes No   Sig: Take 20 mg by mouth 2 times daily   furosemide (LASIX) 40 MG tablet   No No   Sig: Take 1 tablet (40 mg) by mouth 2 times daily   hydrALAZINE (APRESOLINE) 50 MG tablet  Self No No   Sig: Take 1 tablet (50 mg) by mouth 3 times daily   insulin aspart (NOVOLOG PEN) 100 UNIT/ML injection  Self Yes No   Sig: Less than 120 mg/dL = 0 Units  120-149 mg/dL = 1 Unit  150-199 mg/dL = 2 Units  200-249 mg/dL = 3 Units  250-299 mg/dL = 5 Units  300-349 mg/dL = 7 Units  350 mg/dL or greater = 8 Units   insulin glargine (LANTUS SOLOSTAR) 100 UNIT/ML pen  Self No No   Sig: Inject 20 Units Subcutaneous every  evening   isosorbide mononitrate (IMDUR) 30 MG 24 hr tablet  Self No No   Sig: Take 1 tablet (30 mg) by mouth daily   metFORMIN (GLUCOPHAGE) 500 MG tablet  Self Yes No   Sig: Take 500 mg by mouth 2 times daily (with meals)   metoprolol succinate (TOPROL-XL) 25 MG 24 hr tablet  Self No No   Sig: Take 0.5 tablets (12.5 mg) by mouth 2 times daily   nitroGLYcerin (NITROSTAT) 0.4 MG sublingual tablet  Self Yes No   Sig: Place 0.4 mg under the tongue every 5 minutes as needed for chest pain For chest pain place 1 tablet under the tongue every 5 minutes for 3 doses. If symptoms persist 5 minutes after 1st dose call 911.   rosuvastatin (CRESTOR) 20 MG tablet  Self No No   Sig: Take 1 tablet (20 mg) by mouth At Bedtime   warfarin (COUMADIN) 2 MG tablet Past Week at Unknown time Self Yes Yes   Sig: Take 4 mg by mouth daily On 7/1 and 7/2, then recheck INR at [facility] and take as directed      Facility-Administered Medications: None             Review of Systems:   A complete, 10 point ROS was performed and is negative other than what is stated in the HPI.         Physical Exam:   Blood pressure 111/87, temperature 97.9  F (36.6  C), temperature source Oral, resp. rate 16, SpO2 97 %.    GENERAL: Appears alert and oriented times three.   HEENT: Normocephalic, atraumatic.  Mucous membranes moist and without lesions.  NECK: Supple and without lymphadenopathy. JVD ~10cm.  CV: RRR, S1S2 present without murmur, rub, or gallop.   RESPIRATORY: Respirations regular, even, and unlabored. Lungs CTA throughout.   GI: Soft and non distended with normoactive bowel sounds present in all quadrants. No tenderness, rebound, guarding. No organomegaly.    EXTREMITIES: No peripheral edema. 2+ bilateral pedal pulses.   NEUROLOGIC: Alert and orientated x 3. CN II-XII grossly intact. No focal deficits.   MUSCULOSKELETAL: No joint swelling or tenderness.   SKIN: No jaundice. No rashes or lesions.          Data:   Labs and  Imaging Reviewed in Chart   Pertinent studies as below:  INR 1.08, BMP stable compared to last (mild elevation of creatinine)         Assessment and Plan:   58 year old male with a past medical history including CAD (s/p STEMI with ALYSSA to LAD 6/27/18), monomorphic VT s/p ICD shock times four 7/16/18, LV thrombus, CKD Stage III, paroxysmal AF, DM Type II, and ICM with EF 20-25% presents for placement of LVAD scheduled for Monday 12/31/18.     # Chronic systolic heart failure secondary to ICM  # Plan for LVAD  EF 20-25% on last Echo, has single chamber ICD since July, 2018  Last RHC on 11/13: 117 map, 17 CVP, PA 48/31/37, PAW 30/28/28, 1.9 CI  Stage C, NYHA Class IV  ACEi/ARB: was not on ACE/ARB prior to admission given CKD;   Afterload: Continue Hydralazine 50 mg po TID and Imdur daily  Preload: Lasix 40 mg BID  GDMT: BB Toprol XL 12.5 mg po BID; no aldosterone antagonist PTA  - Repeat echo  - 12/31 scheduled for LVAD     # CAD. s/p STEMI with ALYSSA to LAD 6/27/18  - Continue Crestor, and Toprol XL.   - Hold plavix prior to VAD procedure     # Paroxysmal Afib   # Left  Ventricle laminar thrombus  CHADSVASC-4. INR 1.08, warfarin has been held since 12/24 (not able to verify by chart, but per sign-out) in preparation for LVAD procedure 12/31. Also per last ECHO 7/16/18 has LV laminar thrombus which is low risk for thromboembolism.  - Continue Toprol XL  - Repeat ECHO (complete)     # VT/VF.   With cardiac arrest.   - No recent ICD shocks  - Last device interrogation with no evidence of arrhythmias since prior shocks7/16/18.        CKD Stage III.   - Stable    Access: PIV Right forearm  Diet/IVF: 2 g Na  DVT ppx: Heparin subq    CODE: Full    Patient was seen and discussed with Dr. Naga Pike MD MPH  PGY1  [phone]

## 2018-12-29 NOTE — PLAN OF CARE
Admission  D: Patient with planned admission for LVAD implant Monday 12/31.  I: Settled patient and started admission paperwork.  A: AxOx4. SR with BBB, BPs WNL, RA, up ad domingo.   P: Await additional orders. Notify Cards 2 with any changes in status.

## 2018-12-30 ENCOUNTER — APPOINTMENT (OUTPATIENT)
Dept: GENERAL RADIOLOGY | Facility: CLINIC | Age: 58
End: 2018-12-30
Attending: INTERNAL MEDICINE
Payer: MEDICAID

## 2018-12-30 ENCOUNTER — APPOINTMENT (OUTPATIENT)
Dept: CARDIOLOGY | Facility: CLINIC | Age: 58
End: 2018-12-30
Attending: INTERNAL MEDICINE
Payer: MEDICAID

## 2018-12-30 LAB
ABO + RH BLD: NORMAL
ABO + RH BLD: NORMAL
ALBUMIN SERPL-MCNC: 3.6 G/DL (ref 3.4–5)
ALBUMIN UR-MCNC: NEGATIVE MG/DL
ALP SERPL-CCNC: 43 U/L (ref 40–150)
ALT SERPL W P-5'-P-CCNC: 31 U/L (ref 0–70)
ANION GAP SERPL CALCULATED.3IONS-SCNC: 11 MMOL/L (ref 3–14)
ANION GAP SERPL CALCULATED.3IONS-SCNC: 6 MMOL/L (ref 3–14)
ANION GAP SERPL CALCULATED.3IONS-SCNC: 8 MMOL/L (ref 3–14)
APPEARANCE UR: CLEAR
AST SERPL W P-5'-P-CCNC: 20 U/L (ref 0–45)
BASE EXCESS BLDV CALC-SCNC: 0.3 MMOL/L
BASE EXCESS BLDV CALC-SCNC: 2 MMOL/L
BILIRUB DIRECT SERPL-MCNC: 0.1 MG/DL (ref 0–0.2)
BILIRUB SERPL-MCNC: 0.7 MG/DL (ref 0.2–1.3)
BILIRUB UR QL STRIP: NEGATIVE
BLD GP AB SCN SERPL QL: NORMAL
BLD PROD TYP BPU: NORMAL
BLOOD BANK CMNT PATIENT-IMP: NORMAL
BUN SERPL-MCNC: 39 MG/DL (ref 7–30)
BUN SERPL-MCNC: 40 MG/DL (ref 7–30)
BUN SERPL-MCNC: 41 MG/DL (ref 7–30)
CALCIUM SERPL-MCNC: 8.3 MG/DL (ref 8.5–10.1)
CALCIUM SERPL-MCNC: 8.5 MG/DL (ref 8.5–10.1)
CALCIUM SERPL-MCNC: 8.5 MG/DL (ref 8.5–10.1)
CHLORIDE SERPL-SCNC: 102 MMOL/L (ref 94–109)
CHLORIDE SERPL-SCNC: 104 MMOL/L (ref 94–109)
CHLORIDE SERPL-SCNC: 105 MMOL/L (ref 94–109)
CO2 SERPL-SCNC: 23 MMOL/L (ref 20–32)
CO2 SERPL-SCNC: 27 MMOL/L (ref 20–32)
CO2 SERPL-SCNC: 28 MMOL/L (ref 20–32)
COLOR UR AUTO: ABNORMAL
CREAT SERPL-MCNC: 1.6 MG/DL (ref 0.66–1.25)
CREAT SERPL-MCNC: 1.66 MG/DL (ref 0.66–1.25)
CREAT SERPL-MCNC: 1.67 MG/DL (ref 0.66–1.25)
ERYTHROCYTE [DISTWIDTH] IN BLOOD BY AUTOMATED COUNT: 14.7 % (ref 10–15)
ERYTHROCYTE [DISTWIDTH] IN BLOOD BY AUTOMATED COUNT: 14.9 % (ref 10–15)
GFR SERPL CREATININE-BSD FRML MDRD: 45 ML/MIN/{1.73_M2}
GFR SERPL CREATININE-BSD FRML MDRD: 45 ML/MIN/{1.73_M2}
GFR SERPL CREATININE-BSD FRML MDRD: 47 ML/MIN/{1.73_M2}
GLUCOSE BLDC GLUCOMTR-MCNC: 170 MG/DL (ref 70–99)
GLUCOSE BLDC GLUCOMTR-MCNC: 206 MG/DL (ref 70–99)
GLUCOSE BLDC GLUCOMTR-MCNC: 216 MG/DL (ref 70–99)
GLUCOSE BLDC GLUCOMTR-MCNC: 223 MG/DL (ref 70–99)
GLUCOSE BLDC GLUCOMTR-MCNC: 229 MG/DL (ref 70–99)
GLUCOSE BLDC GLUCOMTR-MCNC: 252 MG/DL (ref 70–99)
GLUCOSE BLDC GLUCOMTR-MCNC: 253 MG/DL (ref 70–99)
GLUCOSE SERPL-MCNC: 168 MG/DL (ref 70–99)
GLUCOSE SERPL-MCNC: 217 MG/DL (ref 70–99)
GLUCOSE SERPL-MCNC: 280 MG/DL (ref 70–99)
GLUCOSE UR STRIP-MCNC: 150 MG/DL
HCO3 BLDV-SCNC: 25 MMOL/L (ref 21–28)
HCO3 BLDV-SCNC: 27 MMOL/L (ref 21–28)
HCT VFR BLD AUTO: 40.5 % (ref 40–53)
HCT VFR BLD AUTO: 41.3 % (ref 40–53)
HGB BLD-MCNC: 13 G/DL (ref 13.3–17.7)
HGB BLD-MCNC: 13.2 G/DL (ref 13.3–17.7)
HGB FREE PLAS-MCNC: 120 MG/DL
HGB UR QL STRIP: ABNORMAL
INR PPP: 1.04 (ref 0.86–1.14)
KETONES UR STRIP-MCNC: NEGATIVE MG/DL
LDH SERPL L TO P-CCNC: 145 U/L (ref 85–227)
LEUKOCYTE ESTERASE UR QL STRIP: NEGATIVE
LMWH PPP CHRO-ACNC: 0.22 IU/ML
LMWH PPP CHRO-ACNC: 0.26 IU/ML
MAGNESIUM SERPL-MCNC: 1.9 MG/DL (ref 1.6–2.3)
MCH RBC QN AUTO: 28 PG (ref 26.5–33)
MCH RBC QN AUTO: 28.1 PG (ref 26.5–33)
MCHC RBC AUTO-ENTMCNC: 32 G/DL (ref 31.5–36.5)
MCHC RBC AUTO-ENTMCNC: 32.1 G/DL (ref 31.5–36.5)
MCV RBC AUTO: 87 FL (ref 78–100)
MCV RBC AUTO: 88 FL (ref 78–100)
MUCOUS THREADS #/AREA URNS LPF: PRESENT /LPF
NITRATE UR QL: NEGATIVE
NUM BPU REQUESTED: 6
O2/TOTAL GAS SETTING VFR VENT: 21 %
O2/TOTAL GAS SETTING VFR VENT: NORMAL %
OXYHGB MFR BLDV: 57 %
OXYHGB MFR BLDV: 59 %
PCO2 BLDV: 39 MM HG (ref 40–50)
PCO2 BLDV: 45 MM HG (ref 40–50)
PH BLDV: 7.39 PH (ref 7.32–7.43)
PH BLDV: 7.41 PH (ref 7.32–7.43)
PH UR STRIP: 5 PH (ref 5–7)
PHOSPHATE SERPL-MCNC: 3 MG/DL (ref 2.5–4.5)
PLATELET # BLD AUTO: 211 10E9/L (ref 150–450)
PLATELET # BLD AUTO: 221 10E9/L (ref 150–450)
PO2 BLDV: 32 MM HG (ref 25–47)
PO2 BLDV: 32 MM HG (ref 25–47)
POTASSIUM SERPL-SCNC: 3.3 MMOL/L (ref 3.4–5.3)
POTASSIUM SERPL-SCNC: 3.6 MMOL/L (ref 3.4–5.3)
POTASSIUM SERPL-SCNC: 3.8 MMOL/L (ref 3.4–5.3)
PROT SERPL-MCNC: 7.2 G/DL (ref 6.8–8.8)
RBC # BLD AUTO: 4.64 10E12/L (ref 4.4–5.9)
RBC # BLD AUTO: 4.69 10E12/L (ref 4.4–5.9)
RBC #/AREA URNS AUTO: 29 /HPF (ref 0–2)
SODIUM SERPL-SCNC: 137 MMOL/L (ref 133–144)
SODIUM SERPL-SCNC: 138 MMOL/L (ref 133–144)
SODIUM SERPL-SCNC: 139 MMOL/L (ref 133–144)
SOURCE: ABNORMAL
SP GR UR STRIP: 1.01 (ref 1–1.03)
SPECIMEN EXP DATE BLD: NORMAL
UROBILINOGEN UR STRIP-MCNC: NORMAL MG/DL (ref 0–2)
WBC # BLD AUTO: 5.6 10E9/L (ref 4–11)
WBC # BLD AUTO: 7 10E9/L (ref 4–11)
WBC #/AREA URNS AUTO: <1 /HPF (ref 0–5)

## 2018-12-30 PROCEDURE — 93306 TTE W/DOPPLER COMPLETE: CPT | Mod: 26 | Performed by: INTERNAL MEDICINE

## 2018-12-30 PROCEDURE — 82248 BILIRUBIN DIRECT: CPT | Performed by: SURGERY

## 2018-12-30 PROCEDURE — 86901 BLOOD TYPING SEROLOGIC RH(D): CPT | Performed by: SURGERY

## 2018-12-30 PROCEDURE — 83051 HEMOGLOBIN PLASMA: CPT | Performed by: SURGERY

## 2018-12-30 PROCEDURE — 85027 COMPLETE CBC AUTOMATED: CPT | Performed by: SURGERY

## 2018-12-30 PROCEDURE — 40000986 XR CHEST PORT 1 VW

## 2018-12-30 PROCEDURE — 82805 BLOOD GASES W/O2 SATURATION: CPT | Performed by: INTERNAL MEDICINE

## 2018-12-30 PROCEDURE — 25000125 ZZHC RX 250: Performed by: SURGERY

## 2018-12-30 PROCEDURE — 80048 BASIC METABOLIC PNL TOTAL CA: CPT | Performed by: INTERNAL MEDICINE

## 2018-12-30 PROCEDURE — 85027 COMPLETE CBC AUTOMATED: CPT | Performed by: INTERNAL MEDICINE

## 2018-12-30 PROCEDURE — 20000004 ZZH R&B ICU UMMC

## 2018-12-30 PROCEDURE — 93503 INSERT/PLACE HEART CATHETER: CPT

## 2018-12-30 PROCEDURE — 40000264 ECHOCARDIOGRAM COMPLETE

## 2018-12-30 PROCEDURE — 02HQ32Z INSERTION OF MONITORING DEVICE INTO RIGHT PULMONARY ARTERY, PERCUTANEOUS APPROACH: ICD-10-PCS | Performed by: INTERNAL MEDICINE

## 2018-12-30 PROCEDURE — 86850 RBC ANTIBODY SCREEN: CPT | Performed by: SURGERY

## 2018-12-30 PROCEDURE — 25500064 ZZH RX 255 OP 636: Performed by: INTERNAL MEDICINE

## 2018-12-30 PROCEDURE — 80053 COMPREHEN METABOLIC PANEL: CPT | Performed by: SURGERY

## 2018-12-30 PROCEDURE — 40000196 ZZH STATISTIC RAPCV CVP MONITORING

## 2018-12-30 PROCEDURE — 85520 HEPARIN ASSAY: CPT

## 2018-12-30 PROCEDURE — 71045 X-RAY EXAM CHEST 1 VIEW: CPT

## 2018-12-30 PROCEDURE — 83615 LACTATE (LD) (LDH) ENZYME: CPT | Performed by: SURGERY

## 2018-12-30 PROCEDURE — 85610 PROTHROMBIN TIME: CPT | Performed by: SURGERY

## 2018-12-30 PROCEDURE — 83036 HEMOGLOBIN GLYCOSYLATED A1C: CPT | Performed by: SURGERY

## 2018-12-30 PROCEDURE — 84100 ASSAY OF PHOSPHORUS: CPT | Performed by: INTERNAL MEDICINE

## 2018-12-30 PROCEDURE — 40000275 ZZH STATISTIC RCP TIME EA 10 MIN

## 2018-12-30 PROCEDURE — 81001 URINALYSIS AUTO W/SCOPE: CPT | Performed by: SURGERY

## 2018-12-30 PROCEDURE — 36415 COLL VENOUS BLD VENIPUNCTURE: CPT | Performed by: INTERNAL MEDICINE

## 2018-12-30 PROCEDURE — 83735 ASSAY OF MAGNESIUM: CPT | Performed by: INTERNAL MEDICINE

## 2018-12-30 PROCEDURE — 84134 ASSAY OF PREALBUMIN: CPT | Performed by: SURGERY

## 2018-12-30 PROCEDURE — 27210139 ZZH KIT CATH SVO2/CCO PA SUPPLY

## 2018-12-30 PROCEDURE — 82805 BLOOD GASES W/O2 SATURATION: CPT | Performed by: STUDENT IN AN ORGANIZED HEALTH CARE EDUCATION/TRAINING PROGRAM

## 2018-12-30 PROCEDURE — 25000132 ZZH RX MED GY IP 250 OP 250 PS 637

## 2018-12-30 PROCEDURE — 86923 COMPATIBILITY TEST ELECTRIC: CPT | Performed by: SURGERY

## 2018-12-30 PROCEDURE — 40000048 ZZH STATISTIC DAILY SWAN MONITORING

## 2018-12-30 PROCEDURE — 00000146 ZZHCL STATISTIC GLUCOSE BY METER IP

## 2018-12-30 PROCEDURE — 85520 HEPARIN ASSAY: CPT | Performed by: INTERNAL MEDICINE

## 2018-12-30 PROCEDURE — 86900 BLOOD TYPING SEROLOGIC ABO: CPT | Performed by: SURGERY

## 2018-12-30 PROCEDURE — 36415 COLL VENOUS BLD VENIPUNCTURE: CPT

## 2018-12-30 RX ORDER — FLUCONAZOLE 2 MG/ML
200 INJECTION, SOLUTION INTRAVENOUS
Status: DISCONTINUED | OUTPATIENT
Start: 2018-12-30 | End: 2018-12-31 | Stop reason: HOSPADM

## 2018-12-30 RX ORDER — VANCOMYCIN HYDROCHLORIDE 1 G/200ML
1000 INJECTION, SOLUTION INTRAVENOUS
Status: COMPLETED | OUTPATIENT
Start: 2018-12-30 | End: 2018-12-31

## 2018-12-30 RX ORDER — VANCOMYCIN HYDROCHLORIDE 1 G/200ML
1000 INJECTION, SOLUTION INTRAVENOUS SEE ADMIN INSTRUCTIONS
Status: DISCONTINUED | OUTPATIENT
Start: 2018-12-30 | End: 2018-12-31 | Stop reason: HOSPADM

## 2018-12-30 RX ORDER — FLUCONAZOLE 2 MG/ML
200 INJECTION, SOLUTION INTRAVENOUS
Status: CANCELLED | OUTPATIENT
Start: 2018-12-30

## 2018-12-30 RX ORDER — VANCOMYCIN HYDROCHLORIDE 1 G/200ML
1000 INJECTION, SOLUTION INTRAVENOUS SEE ADMIN INSTRUCTIONS
Status: CANCELLED | OUTPATIENT
Start: 2018-12-30

## 2018-12-30 RX ORDER — MUPIROCIN 20 MG/G
1 OINTMENT TOPICAL 2 TIMES DAILY
Status: DISCONTINUED | OUTPATIENT
Start: 2018-12-30 | End: 2018-12-31 | Stop reason: HOSPADM

## 2018-12-30 RX ORDER — POTASSIUM CHLORIDE 750 MG/1
40 TABLET, EXTENDED RELEASE ORAL DAILY
Status: DISCONTINUED | OUTPATIENT
Start: 2018-12-30 | End: 2019-01-01

## 2018-12-30 RX ORDER — MILRINONE LACTATE 0.2 MG/ML
0.25-0.75 INJECTION, SOLUTION INTRAVENOUS CONTINUOUS
Status: CANCELLED | OUTPATIENT
Start: 2018-12-30

## 2018-12-30 RX ORDER — LEVOFLOXACIN 5 MG/ML
500 INJECTION, SOLUTION INTRAVENOUS SEE ADMIN INSTRUCTIONS
Status: DISCONTINUED | OUTPATIENT
Start: 2018-12-30 | End: 2018-12-31 | Stop reason: HOSPADM

## 2018-12-30 RX ORDER — LEVOFLOXACIN 5 MG/ML
500 INJECTION, SOLUTION INTRAVENOUS
Status: COMPLETED | OUTPATIENT
Start: 2018-12-30 | End: 2018-12-31

## 2018-12-30 RX ORDER — POTASSIUM CHLORIDE 29.8 MG/ML
20 INJECTION INTRAVENOUS ONCE
Status: DISCONTINUED | OUTPATIENT
Start: 2018-12-30 | End: 2018-12-30

## 2018-12-30 RX ORDER — PROPOFOL 10 MG/ML
5-75 INJECTION, EMULSION INTRAVENOUS CONTINUOUS
Status: CANCELLED | OUTPATIENT
Start: 2018-12-30

## 2018-12-30 RX ORDER — LEVOFLOXACIN 5 MG/ML
500 INJECTION, SOLUTION INTRAVENOUS
Status: CANCELLED | OUTPATIENT
Start: 2018-12-30

## 2018-12-30 RX ORDER — VANCOMYCIN HYDROCHLORIDE 1 G/200ML
1000 INJECTION, SOLUTION INTRAVENOUS
Status: CANCELLED | OUTPATIENT
Start: 2018-12-30

## 2018-12-30 RX ORDER — LEVOFLOXACIN 5 MG/ML
500 INJECTION, SOLUTION INTRAVENOUS SEE ADMIN INSTRUCTIONS
Status: CANCELLED | OUTPATIENT
Start: 2018-12-30

## 2018-12-30 RX ADMIN — Medication 12.5 MG: at 08:01

## 2018-12-30 RX ADMIN — FAMOTIDINE 20 MG: 20 TABLET, FILM COATED ORAL at 08:01

## 2018-12-30 RX ADMIN — FAMOTIDINE 20 MG: 20 TABLET, FILM COATED ORAL at 19:55

## 2018-12-30 RX ADMIN — MUPIROCIN 1 G: 20 OINTMENT TOPICAL at 21:48

## 2018-12-30 RX ADMIN — INSULIN ASPART 1 UNITS: 100 INJECTION, SOLUTION INTRAVENOUS; SUBCUTANEOUS at 08:25

## 2018-12-30 RX ADMIN — FUROSEMIDE 40 MG: 40 TABLET ORAL at 08:01

## 2018-12-30 RX ADMIN — DIGOXIN 125 MCG: 125 TABLET ORAL at 08:00

## 2018-12-30 RX ADMIN — HYDRALAZINE HYDROCHLORIDE 50 MG: 50 TABLET ORAL at 08:01

## 2018-12-30 RX ADMIN — INSULIN ASPART 2 UNITS: 100 INJECTION, SOLUTION INTRAVENOUS; SUBCUTANEOUS at 18:32

## 2018-12-30 RX ADMIN — FUROSEMIDE 40 MG: 40 TABLET ORAL at 17:24

## 2018-12-30 RX ADMIN — Medication 12.5 MG: at 19:55

## 2018-12-30 RX ADMIN — HYDRALAZINE HYDROCHLORIDE 50 MG: 50 TABLET ORAL at 13:10

## 2018-12-30 RX ADMIN — INSULIN GLARGINE 20 UNITS: 100 INJECTION, SOLUTION SUBCUTANEOUS at 20:12

## 2018-12-30 RX ADMIN — INSULIN ASPART 2 UNITS: 100 INJECTION, SOLUTION INTRAVENOUS; SUBCUTANEOUS at 13:10

## 2018-12-30 RX ADMIN — ROSUVASTATIN CALCIUM 20 MG: 20 TABLET, FILM COATED ORAL at 19:55

## 2018-12-30 RX ADMIN — ISOSORBIDE MONONITRATE 30 MG: 30 TABLET, EXTENDED RELEASE ORAL at 08:01

## 2018-12-30 RX ADMIN — AMIODARONE HYDROCHLORIDE 200 MG: 200 TABLET ORAL at 08:01

## 2018-12-30 RX ADMIN — POTASSIUM CHLORIDE 40 MEQ: 750 TABLET, EXTENDED RELEASE ORAL at 09:29

## 2018-12-30 RX ADMIN — HYDRALAZINE HYDROCHLORIDE 50 MG: 50 TABLET ORAL at 19:54

## 2018-12-30 RX ADMIN — HUMAN ALBUMIN MICROSPHERES AND PERFLUTREN 6 ML: 10; .22 INJECTION, SOLUTION INTRAVENOUS at 09:00

## 2018-12-30 ASSESSMENT — ACTIVITIES OF DAILY LIVING (ADL)
ADLS_ACUITY_SCORE: 10

## 2018-12-30 NOTE — PROVIDER NOTIFICATION
Pt BP 85/56; Map 65  Cardiology cross cover [id number]) paged regarding low BP.  Dr. Srinivas xavier with BP if map equal or greater than 65.   Will continue to monitor closely.

## 2018-12-30 NOTE — PLAN OF CARE
D:pt up as tolerated steady gait  A:pt somewhat nervous for surgery Monday  I:listened and validated pt's concerns  P:surgery scheduled for Monday

## 2018-12-30 NOTE — PROGRESS NOTES
Patient seen and examined. Investigations reviewed. Will plan for HM 3 LVAD placement tomorrow. I discussed the risks and benefits of surgery with patient including risks of death, bleeding, stroke, infection, RV and renal failure. He understands and is willing to proceed with surgery. Plan dicussed with Heart Failure team who is in agreement to proceed.

## 2018-12-30 NOTE — PLAN OF CARE
VS: hypotensive; MAP above 65; no additional intervention necessary per cardiology cross cover MD, Dr. Espino; other VSS, afebrile, on RA  Mental Status: A&Ox4  Cardiac: hypotensive, on tele cardiac monitoring, denies chest pain; heparin gtt  within therapeutic range (continue heparin gtt until 12/31/midnight)   Respiratory: LS clear, bilaterally, denies SOB  GI/: voiding adequate amount urine, measured. Abdomen rounded, + bowel tones, denies nausea, no BM this shift   Pain: denies pain   Diet: tolerating 2g sodium restricted diet;  @ 0200   Mobility: independent; up ad domingo   Treatment: plan for LVAD implant 1/31   DC plan: home w/ prior living arrangement

## 2018-12-30 NOTE — PLAN OF CARE
AVSS; denies chest pain/SOB, lightheadedness/dizziness/nausea; on RA and maintaining SPO>92%; clear lung sounds. Voids spont, no BM yet today. Hep gtt stopped at 1230 for planned RHC sometime today and to transfer to  afterwards. Plan for LVAD placement tomorrow.   K+ 3.3, not on replacement protocol but started on daily KCL. Continue to monitor and with POC,  update MD with changes/concerns.    UPDATE: Report given to  GERMAN Unger  and pt was transported to unit.

## 2018-12-31 ENCOUNTER — ANESTHESIA (OUTPATIENT)
Dept: SURGERY | Facility: CLINIC | Age: 58
End: 2018-12-31
Payer: MEDICAID

## 2018-12-31 ENCOUNTER — APPOINTMENT (OUTPATIENT)
Dept: GENERAL RADIOLOGY | Facility: CLINIC | Age: 58
End: 2018-12-31
Attending: THORACIC SURGERY (CARDIOTHORACIC VASCULAR SURGERY)
Payer: MEDICAID

## 2018-12-31 ENCOUNTER — APPOINTMENT (OUTPATIENT)
Dept: GENERAL RADIOLOGY | Facility: CLINIC | Age: 58
End: 2018-12-31
Attending: SURGERY
Payer: MEDICAID

## 2018-12-31 ENCOUNTER — APPOINTMENT (OUTPATIENT)
Dept: GENERAL RADIOLOGY | Facility: CLINIC | Age: 58
End: 2018-12-31
Attending: INTERNAL MEDICINE
Payer: MEDICAID

## 2018-12-31 ENCOUNTER — ANCILLARY PROCEDURE (OUTPATIENT)
Dept: CARDIOLOGY | Facility: CLINIC | Age: 58
End: 2018-12-31
Attending: INTERNAL MEDICINE
Payer: MEDICAID

## 2018-12-31 LAB
ALBUMIN SERPL-MCNC: 2.6 G/DL (ref 3.4–5)
ALBUMIN SERPL-MCNC: 2.7 G/DL (ref 3.4–5)
ALBUMIN SERPL-MCNC: 3 G/DL (ref 3.4–5)
ALP SERPL-CCNC: 23 U/L (ref 40–150)
ALP SERPL-CCNC: 23 U/L (ref 40–150)
ALP SERPL-CCNC: 33 U/L (ref 40–150)
ALT SERPL W P-5'-P-CCNC: 18 U/L (ref 0–70)
ALT SERPL W P-5'-P-CCNC: 23 U/L (ref 0–70)
ALT SERPL W P-5'-P-CCNC: 26 U/L (ref 0–70)
ANGLE RATE OF CLOT GROWTH: 73.6 DEG (ref 59–74)
ANGLE RATE OF CLOT GROWTH: NORMAL DEG (ref 59–74)
ANION GAP SERPL CALCULATED.3IONS-SCNC: 11 MMOL/L (ref 3–14)
ANION GAP SERPL CALCULATED.3IONS-SCNC: 12 MMOL/L (ref 3–14)
ANION GAP SERPL CALCULATED.3IONS-SCNC: 6 MMOL/L (ref 3–14)
ANION GAP SERPL CALCULATED.3IONS-SCNC: 7 MMOL/L (ref 3–14)
APTT PPP: 34 SEC (ref 22–37)
APTT PPP: 40 SEC (ref 22–37)
AST SERPL W P-5'-P-CCNC: 29 U/L (ref 0–45)
AST SERPL W P-5'-P-CCNC: 31 U/L (ref 0–45)
AST SERPL W P-5'-P-CCNC: 32 U/L (ref 0–45)
BASE DEFICIT BLDA-SCNC: 1.5 MMOL/L
BASE DEFICIT BLDA-SCNC: 2.1 MMOL/L
BASE DEFICIT BLDA-SCNC: 3.2 MMOL/L
BASE DEFICIT BLDA-SCNC: 3.4 MMOL/L
BASE DEFICIT BLDA-SCNC: 3.5 MMOL/L
BASE DEFICIT BLDA-SCNC: 3.7 MMOL/L
BASE DEFICIT BLDA-SCNC: 4 MMOL/L
BASE DEFICIT BLDA-SCNC: 4.6 MMOL/L
BASE DEFICIT BLDA-SCNC: 6.2 MMOL/L
BASE DEFICIT BLDV-SCNC: 1.5 MMOL/L
BASE DEFICIT BLDV-SCNC: 3.6 MMOL/L
BASE EXCESS BLDA CALC-SCNC: 1.1 MMOL/L
BASE EXCESS BLDA CALC-SCNC: 3.4 MMOL/L
BASE EXCESS BLDV CALC-SCNC: 0.3 MMOL/L
BASE EXCESS BLDV CALC-SCNC: 1.2 MMOL/L
BASOPHILS # BLD AUTO: 0.1 10E9/L (ref 0–0.2)
BASOPHILS NFR BLD AUTO: 0.9 %
BILIRUB SERPL-MCNC: 1.4 MG/DL (ref 0.2–1.3)
BILIRUB SERPL-MCNC: 1.8 MG/DL (ref 0.2–1.3)
BILIRUB SERPL-MCNC: 1.9 MG/DL (ref 0.2–1.3)
BLD PROD TYP BPU: NORMAL
BLD UNIT ID BPU: 0
BLOOD PRODUCT CODE: NORMAL
BPU ID: NORMAL
BUN SERPL-MCNC: 22 MG/DL (ref 7–30)
BUN SERPL-MCNC: 26 MG/DL (ref 7–30)
BUN SERPL-MCNC: 28 MG/DL (ref 7–30)
BUN SERPL-MCNC: 36 MG/DL (ref 7–30)
CA-I BLD-MCNC: 4 MG/DL (ref 4.4–5.2)
CA-I BLD-MCNC: 4.1 MG/DL (ref 4.4–5.2)
CA-I BLD-MCNC: 4.2 MG/DL (ref 4.4–5.2)
CA-I BLD-MCNC: 4.3 MG/DL (ref 4.4–5.2)
CA-I BLD-MCNC: 4.5 MG/DL (ref 4.4–5.2)
CA-I BLD-MCNC: 4.8 MG/DL (ref 4.4–5.2)
CA-I BLD-MCNC: 4.8 MG/DL (ref 4.4–5.2)
CA-I BLD-MCNC: 5.3 MG/DL (ref 4.4–5.2)
CA-I BLD-MCNC: 5.7 MG/DL (ref 4.4–5.2)
CALCIUM SERPL-MCNC: 8.1 MG/DL (ref 8.5–10.1)
CALCIUM SERPL-MCNC: 8.4 MG/DL (ref 8.5–10.1)
CALCIUM SERPL-MCNC: 8.5 MG/DL (ref 8.5–10.1)
CALCIUM SERPL-MCNC: 9.2 MG/DL (ref 8.5–10.1)
CHLORIDE SERPL-SCNC: 108 MMOL/L (ref 94–109)
CHLORIDE SERPL-SCNC: 113 MMOL/L (ref 94–109)
CHLORIDE SERPL-SCNC: 115 MMOL/L (ref 94–109)
CHLORIDE SERPL-SCNC: 117 MMOL/L (ref 94–109)
CI HYPERCOAGULATION INDEX: 3.3 RATIO (ref 0–3)
CI HYPERCOAGULATION INDEX: NORMAL RATIO (ref 0–3)
CI HYPOCOAGULATION INDEX: NORMAL RATIO (ref 0–3)
CLOT LYSIS 30M P MA LENFR BLD TEG: 0 % (ref 0–8)
CLOT LYSIS 30M P MA LENFR BLD TEG: NORMAL % (ref 0–8)
CLOT STRENGTH BLD TEG: 13.1 KD/SC (ref 5.3–13.2)
CLOT STRENGTH BLD TEG: NORMAL KD/SC (ref 5.3–13.2)
CO2 SERPL-SCNC: 21 MMOL/L (ref 20–32)
CO2 SERPL-SCNC: 22 MMOL/L (ref 20–32)
CO2 SERPL-SCNC: 25 MMOL/L (ref 20–32)
CO2 SERPL-SCNC: 26 MMOL/L (ref 20–32)
CREAT SERPL-MCNC: 1.32 MG/DL (ref 0.66–1.25)
CREAT SERPL-MCNC: 1.45 MG/DL (ref 0.66–1.25)
CREAT SERPL-MCNC: 1.5 MG/DL (ref 0.66–1.25)
CREAT SERPL-MCNC: 1.56 MG/DL (ref 0.66–1.25)
DIFFERENTIAL METHOD BLD: ABNORMAL
EOSINOPHIL # BLD AUTO: 0 10E9/L (ref 0–0.7)
EOSINOPHIL NFR BLD AUTO: 0 %
ERYTHROCYTE [DISTWIDTH] IN BLOOD BY AUTOMATED COUNT: 14.6 % (ref 10–15)
ERYTHROCYTE [DISTWIDTH] IN BLOOD BY AUTOMATED COUNT: 14.6 % (ref 10–15)
ERYTHROCYTE [DISTWIDTH] IN BLOOD BY AUTOMATED COUNT: 14.8 % (ref 10–15)
FIBRINOGEN PPP-MCNC: 231 MG/DL (ref 200–420)
GFR SERPL CREATININE-BSD FRML MDRD: 49 ML/MIN/{1.73_M2}
GFR SERPL CREATININE-BSD FRML MDRD: 51 ML/MIN/{1.73_M2}
GFR SERPL CREATININE-BSD FRML MDRD: 53 ML/MIN/{1.73_M2}
GFR SERPL CREATININE-BSD FRML MDRD: 60 ML/MIN/{1.73_M2}
GLUCOSE BLD-MCNC: 217 MG/DL (ref 70–99)
GLUCOSE BLD-MCNC: 235 MG/DL (ref 70–99)
GLUCOSE BLD-MCNC: 238 MG/DL (ref 70–99)
GLUCOSE BLD-MCNC: 277 MG/DL (ref 70–99)
GLUCOSE BLD-MCNC: 284 MG/DL (ref 70–99)
GLUCOSE BLD-MCNC: 297 MG/DL (ref 70–99)
GLUCOSE BLD-MCNC: 300 MG/DL (ref 70–99)
GLUCOSE BLDC GLUCOMTR-MCNC: 139 MG/DL (ref 70–99)
GLUCOSE BLDC GLUCOMTR-MCNC: 145 MG/DL (ref 70–99)
GLUCOSE BLDC GLUCOMTR-MCNC: 145 MG/DL (ref 70–99)
GLUCOSE BLDC GLUCOMTR-MCNC: 150 MG/DL (ref 70–99)
GLUCOSE BLDC GLUCOMTR-MCNC: 151 MG/DL (ref 70–99)
GLUCOSE BLDC GLUCOMTR-MCNC: 161 MG/DL (ref 70–99)
GLUCOSE BLDC GLUCOMTR-MCNC: 167 MG/DL (ref 70–99)
GLUCOSE BLDC GLUCOMTR-MCNC: 175 MG/DL (ref 70–99)
GLUCOSE BLDC GLUCOMTR-MCNC: 179 MG/DL (ref 70–99)
GLUCOSE BLDC GLUCOMTR-MCNC: 190 MG/DL (ref 70–99)
GLUCOSE BLDC GLUCOMTR-MCNC: 217 MG/DL (ref 70–99)
GLUCOSE SERPL-MCNC: 145 MG/DL (ref 70–99)
GLUCOSE SERPL-MCNC: 178 MG/DL (ref 70–99)
GLUCOSE SERPL-MCNC: 187 MG/DL (ref 70–99)
GLUCOSE SERPL-MCNC: 223 MG/DL (ref 70–99)
HBA1C MFR BLD: 10.1 % (ref 0–5.6)
HCO3 BLD-SCNC: 20 MMOL/L (ref 21–28)
HCO3 BLD-SCNC: 20 MMOL/L (ref 21–28)
HCO3 BLD-SCNC: 21 MMOL/L (ref 21–28)
HCO3 BLD-SCNC: 21 MMOL/L (ref 21–28)
HCO3 BLD-SCNC: 22 MMOL/L (ref 21–28)
HCO3 BLD-SCNC: 23 MMOL/L (ref 21–28)
HCO3 BLD-SCNC: 24 MMOL/L (ref 21–28)
HCO3 BLD-SCNC: 27 MMOL/L (ref 21–28)
HCO3 BLD-SCNC: 28 MMOL/L (ref 21–28)
HCO3 BLDV-SCNC: 23 MMOL/L (ref 21–28)
HCO3 BLDV-SCNC: 26 MMOL/L (ref 21–28)
HCT VFR BLD AUTO: 23.6 % (ref 40–53)
HCT VFR BLD AUTO: 26.1 % (ref 40–53)
HCT VFR BLD AUTO: 29.9 % (ref 40–53)
HCT VFR BLD AUTO: 30.2 % (ref 40–53)
HCT VFR BLD AUTO: 40.9 % (ref 40–53)
HGB BLD-MCNC: 10.9 G/DL (ref 13.3–17.7)
HGB BLD-MCNC: 11 G/DL (ref 13.3–17.7)
HGB BLD-MCNC: 13.2 G/DL (ref 13.3–17.7)
HGB BLD-MCNC: 13.3 G/DL (ref 13.3–17.7)
HGB BLD-MCNC: 7.8 G/DL (ref 13.3–17.7)
HGB BLD-MCNC: 8.4 G/DL (ref 13.3–17.7)
HGB BLD-MCNC: 8.5 G/DL (ref 13.3–17.7)
HGB BLD-MCNC: 8.7 G/DL (ref 13.3–17.7)
HGB BLD-MCNC: 8.9 G/DL (ref 13.3–17.7)
HGB BLD-MCNC: 9.4 G/DL (ref 13.3–17.7)
HGB BLD-MCNC: 9.5 G/DL (ref 13.3–17.7)
HGB BLD-MCNC: 9.6 G/DL (ref 13.3–17.7)
ICDO DEVICE COMMENTS: NORMAL
INR PPP: 1.42 (ref 0.86–1.14)
INR PPP: 1.65 (ref 0.86–1.14)
INTERPRETATION ECG - MUSE: NORMAL
K TIME TO SPEC CLOT STRENGTH: 1.2 MIN (ref 1–3)
K TIME TO SPEC CLOT STRENGTH: NORMAL MIN (ref 1–3)
LACTATE BLD-SCNC: 1.4 MMOL/L (ref 0.7–2)
LACTATE BLD-SCNC: 2.1 MMOL/L (ref 0.7–2)
LACTATE BLD-SCNC: 2.2 MMOL/L (ref 0.7–2)
LACTATE BLD-SCNC: 2.5 MMOL/L (ref 0.7–2)
LACTATE BLD-SCNC: 3.4 MMOL/L (ref 0.7–2)
LACTATE BLD-SCNC: 4.4 MMOL/L (ref 0.7–2)
LACTATE BLD-SCNC: 5.2 MMOL/L (ref 0.7–2)
LACTATE BLD-SCNC: 5.7 MMOL/L (ref 0.7–2)
LACTATE BLD-SCNC: 6.4 MMOL/L (ref 0.7–2)
LACTATE BLD-SCNC: 6.7 MMOL/L (ref 0.7–2)
LACTATE BLD-SCNC: 6.8 MMOL/L (ref 0.7–2)
LACTATE BLD-SCNC: 7.7 MMOL/L (ref 0.7–2)
LY60 LYSIS AT 60 MINUTES: 0.6 % (ref 0–15)
LY60 LYSIS AT 60 MINUTES: NORMAL % (ref 0–15)
LYMPHOCYTES # BLD AUTO: 2.9 10E9/L (ref 0.8–5.3)
LYMPHOCYTES NFR BLD AUTO: 18.6 %
MA MAXIMUM CLOT STRENGTH: 72.3 MM (ref 55–74)
MA MAXIMUM CLOT STRENGTH: NORMAL MM (ref 55–74)
MAGNESIUM SERPL-MCNC: 1.8 MG/DL (ref 1.6–2.3)
MAGNESIUM SERPL-MCNC: 1.9 MG/DL (ref 1.6–2.3)
MAGNESIUM SERPL-MCNC: 2 MG/DL (ref 1.6–2.3)
MAGNESIUM SERPL-MCNC: 2.2 MG/DL (ref 1.6–2.3)
MCH RBC QN AUTO: 27.9 PG (ref 26.5–33)
MCH RBC QN AUTO: 27.9 PG (ref 26.5–33)
MCH RBC QN AUTO: 28.2 PG (ref 26.5–33)
MCH RBC QN AUTO: 28.5 PG (ref 26.5–33)
MCH RBC QN AUTO: 28.5 PG (ref 26.5–33)
MCHC RBC AUTO-ENTMCNC: 31.4 G/DL (ref 31.5–36.5)
MCHC RBC AUTO-ENTMCNC: 31.8 G/DL (ref 31.5–36.5)
MCHC RBC AUTO-ENTMCNC: 32.2 G/DL (ref 31.5–36.5)
MCHC RBC AUTO-ENTMCNC: 32.5 G/DL (ref 31.5–36.5)
MCHC RBC AUTO-ENTMCNC: 33.1 G/DL (ref 31.5–36.5)
MCV RBC AUTO: 86 FL (ref 78–100)
MCV RBC AUTO: 88 FL (ref 78–100)
MCV RBC AUTO: 89 FL (ref 78–100)
MDC_IDC_LEAD_IMPLANT_DT: NORMAL
MDC_IDC_LEAD_IMPLANT_DT: NORMAL
MDC_IDC_LEAD_LOCATION: NORMAL
MDC_IDC_LEAD_LOCATION: NORMAL
MDC_IDC_LEAD_LOCATION_DETAIL_1: NORMAL
MDC_IDC_LEAD_LOCATION_DETAIL_1: NORMAL
MDC_IDC_LEAD_MFG: NORMAL
MDC_IDC_LEAD_MFG: NORMAL
MDC_IDC_LEAD_MODEL: NORMAL
MDC_IDC_LEAD_MODEL: NORMAL
MDC_IDC_LEAD_POLARITY_TYPE: NORMAL
MDC_IDC_LEAD_POLARITY_TYPE: NORMAL
MDC_IDC_LEAD_SERIAL: NORMAL
MDC_IDC_LEAD_SERIAL: NORMAL
MDC_IDC_LEAD_SPECIAL_FUNCTION: NORMAL
MDC_IDC_LEAD_SPECIAL_FUNCTION: NORMAL
MDC_IDC_MSMT_BATTERY_DTM: NORMAL
MDC_IDC_MSMT_BATTERY_REMAINING_LONGEVITY: 144 MO
MDC_IDC_MSMT_BATTERY_STATUS: NORMAL
MDC_IDC_MSMT_CAP_CHARGE_DTM: NORMAL
MDC_IDC_MSMT_CAP_CHARGE_DTM: NORMAL
MDC_IDC_MSMT_CAP_CHARGE_ENERGY: 41 J
MDC_IDC_MSMT_CAP_CHARGE_TIME: 8.39
MDC_IDC_MSMT_CAP_CHARGE_TIME: 8.39
MDC_IDC_MSMT_CAP_CHARGE_TYPE: NORMAL
MDC_IDC_MSMT_CAP_CHARGE_TYPE: NORMAL
MDC_IDC_MSMT_LEADCHNL_RV_IMPEDANCE_VALUE: 1003 OHM
MDC_IDC_MSMT_LEADCHNL_RV_PACING_THRESHOLD_AMPLITUDE: 0.9 V
MDC_IDC_MSMT_LEADCHNL_RV_PACING_THRESHOLD_PULSEWIDTH: 1 MS
MDC_IDC_MSMT_LEADCHNL_RV_SENSING_INTR_AMPL: 5.3 MV
MDC_IDC_PG_IMPLANT_DTM: NORMAL
MDC_IDC_PG_MFG: NORMAL
MDC_IDC_PG_MODEL: NORMAL
MDC_IDC_PG_SERIAL: NORMAL
MDC_IDC_PG_TYPE: NORMAL
MDC_IDC_SESS_CLINIC_NAME: NORMAL
MDC_IDC_SESS_DTM: NORMAL
MDC_IDC_SESS_TYPE: NORMAL
MDC_IDC_SET_BRADY_HYSTRATE: NORMAL
MDC_IDC_SET_BRADY_LOWRATE: 80 {BEATS}/MIN
MDC_IDC_SET_BRADY_MODE: NORMAL
MDC_IDC_SET_LEADCHNL_RV_PACING_AMPLITUDE: 2 V
MDC_IDC_SET_LEADCHNL_RV_PACING_ANODE_ELECTRODE_1: NORMAL
MDC_IDC_SET_LEADCHNL_RV_PACING_ANODE_LOCATION_1: NORMAL
MDC_IDC_SET_LEADCHNL_RV_PACING_CAPTURE_MODE: NORMAL
MDC_IDC_SET_LEADCHNL_RV_PACING_CATHODE_ELECTRODE_1: NORMAL
MDC_IDC_SET_LEADCHNL_RV_PACING_CATHODE_LOCATION_1: NORMAL
MDC_IDC_SET_LEADCHNL_RV_PACING_POLARITY: NORMAL
MDC_IDC_SET_LEADCHNL_RV_PACING_PULSEWIDTH: 1 MS
MDC_IDC_SET_LEADCHNL_RV_SENSING_ADAPTATION_MODE: NORMAL
MDC_IDC_SET_LEADCHNL_RV_SENSING_ANODE_ELECTRODE_1: NORMAL
MDC_IDC_SET_LEADCHNL_RV_SENSING_ANODE_LOCATION_1: NORMAL
MDC_IDC_SET_LEADCHNL_RV_SENSING_CATHODE_ELECTRODE_1: NORMAL
MDC_IDC_SET_LEADCHNL_RV_SENSING_CATHODE_LOCATION_1: NORMAL
MDC_IDC_SET_LEADCHNL_RV_SENSING_POLARITY: NORMAL
MDC_IDC_SET_LEADCHNL_RV_SENSING_SENSITIVITY: 0.6 MV
MDC_IDC_SET_ZONE_DETECTION_INTERVAL: 300 MS
MDC_IDC_SET_ZONE_DETECTION_INTERVAL: 375 MS
MDC_IDC_SET_ZONE_TYPE: NORMAL
MDC_IDC_SET_ZONE_VENDOR_TYPE: NORMAL
MDC_IDC_STAT_BRADY_RV_PERCENT_PACED: 1 %
MDC_IDC_STAT_EPISODE_RECENT_COUNT: 0
MDC_IDC_STAT_EPISODE_RECENT_COUNT_DTM_END: NORMAL
MDC_IDC_STAT_EPISODE_RECENT_COUNT_DTM_START: NORMAL
MDC_IDC_STAT_EPISODE_TOTAL_COUNT: 0
MDC_IDC_STAT_EPISODE_TOTAL_COUNT: 48
MDC_IDC_STAT_EPISODE_TOTAL_COUNT: 6
MDC_IDC_STAT_EPISODE_TOTAL_COUNT_DTM_END: NORMAL
MDC_IDC_STAT_EPISODE_TYPE: NORMAL
MDC_IDC_STAT_EPISODE_VENDOR_TYPE: NORMAL
MDC_IDC_STAT_TACHYTHERAPY_ATP_DELIVERED_RECENT: 0
MDC_IDC_STAT_TACHYTHERAPY_ATP_DELIVERED_TOTAL: 9
MDC_IDC_STAT_TACHYTHERAPY_RECENT_DTM_END: NORMAL
MDC_IDC_STAT_TACHYTHERAPY_RECENT_DTM_START: NORMAL
MDC_IDC_STAT_TACHYTHERAPY_SHOCKS_ABORTED_RECENT: 0
MDC_IDC_STAT_TACHYTHERAPY_SHOCKS_ABORTED_TOTAL: 0
MDC_IDC_STAT_TACHYTHERAPY_SHOCKS_DELIVERED_RECENT: 0
MDC_IDC_STAT_TACHYTHERAPY_SHOCKS_DELIVERED_TOTAL: 5
MDC_IDC_STAT_TACHYTHERAPY_TOTAL_DTM_END: NORMAL
METAMYELOCYTES # BLD: 0.1 10E9/L
METAMYELOCYTES NFR BLD MANUAL: 0.9 %
MONOCYTES # BLD AUTO: 0 10E9/L (ref 0–1.3)
MONOCYTES NFR BLD AUTO: 0 %
NEUTROPHILS # BLD AUTO: 12.1 10E9/L (ref 1.6–8.3)
NEUTROPHILS NFR BLD AUTO: 76.9 %
NUM BPU REQUESTED: 2
NUM BPU REQUESTED: 2
O2/TOTAL GAS SETTING VFR VENT: 40 %
O2/TOTAL GAS SETTING VFR VENT: 50 %
O2/TOTAL GAS SETTING VFR VENT: 50 %
O2/TOTAL GAS SETTING VFR VENT: 70 %
O2/TOTAL GAS SETTING VFR VENT: 80 %
O2/TOTAL GAS SETTING VFR VENT: NORMAL %
O2/TOTAL GAS SETTING VFR VENT: NORMAL %
OXYHGB MFR BLD: 94 % (ref 92–100)
OXYHGB MFR BLD: 96 % (ref 92–100)
OXYHGB MFR BLD: 97 % (ref 92–100)
OXYHGB MFR BLDV: 59 %
OXYHGB MFR BLDV: 66 %
OXYHGB MFR BLDV: 69 %
PCO2 BLD: 31 MM HG (ref 35–45)
PCO2 BLD: 33 MM HG (ref 35–45)
PCO2 BLD: 34 MM HG (ref 35–45)
PCO2 BLD: 37 MM HG (ref 35–45)
PCO2 BLD: 41 MM HG (ref 35–45)
PCO2 BLD: 43 MM HG (ref 35–45)
PCO2 BLD: 43 MM HG (ref 35–45)
PCO2 BLD: 45 MM HG (ref 35–45)
PCO2 BLD: 49 MM HG (ref 35–45)
PCO2 BLD: 55 MM HG (ref 35–45)
PCO2 BLD: 57 MM HG (ref 35–45)
PCO2 BLDV: 43 MM HG (ref 40–50)
PCO2 BLDV: 44 MM HG (ref 40–50)
PCO2 BLDV: 48 MM HG (ref 40–50)
PCO2 BLDV: 56 MM HG (ref 40–50)
PH BLD: 7.25 PH (ref 7.35–7.45)
PH BLD: 7.27 PH (ref 7.35–7.45)
PH BLD: 7.28 PH (ref 7.35–7.45)
PH BLD: 7.3 PH (ref 7.35–7.45)
PH BLD: 7.31 PH (ref 7.35–7.45)
PH BLD: 7.34 PH (ref 7.35–7.45)
PH BLD: 7.37 PH (ref 7.35–7.45)
PH BLD: 7.4 PH (ref 7.35–7.45)
PH BLD: 7.41 PH (ref 7.35–7.45)
PH BLD: 7.42 PH (ref 7.35–7.45)
PH BLD: 7.47 PH (ref 7.35–7.45)
PH BLDV: 7.27 PH (ref 7.32–7.43)
PH BLDV: 7.29 PH (ref 7.32–7.43)
PH BLDV: 7.38 PH (ref 7.32–7.43)
PH BLDV: 7.4 PH (ref 7.32–7.43)
PHOSPHATE SERPL-MCNC: 0.1 MG/DL (ref 2.5–4.5)
PHOSPHATE SERPL-MCNC: 0.2 MG/DL (ref 2.5–4.5)
PHOSPHATE SERPL-MCNC: 0.8 MG/DL (ref 2.5–4.5)
PHOSPHATE SERPL-MCNC: 2.6 MG/DL (ref 2.5–4.5)
PHOSPHATE SERPL-MCNC: 3.5 MG/DL (ref 2.5–4.5)
PLATELET # BLD AUTO: 122 10E9/L (ref 150–450)
PLATELET # BLD AUTO: 142 10E9/L (ref 150–450)
PLATELET # BLD AUTO: 167 10E9/L (ref 150–450)
PLATELET # BLD AUTO: 191 10E9/L (ref 150–450)
PLATELET # BLD AUTO: 218 10E9/L (ref 150–450)
PLATELET # BLD EST: ABNORMAL 10*3/UL
PO2 BLD: 112 MM HG (ref 80–105)
PO2 BLD: 150 MM HG (ref 80–105)
PO2 BLD: 172 MM HG (ref 80–105)
PO2 BLD: 178 MM HG (ref 80–105)
PO2 BLD: 186 MM HG (ref 80–105)
PO2 BLD: 188 MM HG (ref 80–105)
PO2 BLD: 229 MM HG (ref 80–105)
PO2 BLD: 236 MM HG (ref 80–105)
PO2 BLD: 236 MM HG (ref 80–105)
PO2 BLD: 256 MM HG (ref 80–105)
PO2 BLD: 290 MM HG (ref 80–105)
PO2 BLDV: 33 MM HG (ref 25–47)
PO2 BLDV: 37 MM HG (ref 25–47)
PO2 BLDV: 41 MM HG (ref 25–47)
PO2 BLDV: 48 MM HG (ref 25–47)
POTASSIUM BLD-SCNC: 2.5 MMOL/L (ref 3.4–5.3)
POTASSIUM BLD-SCNC: 2.6 MMOL/L (ref 3.4–5.3)
POTASSIUM BLD-SCNC: 2.9 MMOL/L (ref 3.4–5.3)
POTASSIUM BLD-SCNC: 3.1 MMOL/L (ref 3.4–5.3)
POTASSIUM BLD-SCNC: 3.3 MMOL/L (ref 3.4–5.3)
POTASSIUM BLD-SCNC: 3.4 MMOL/L (ref 3.4–5.3)
POTASSIUM BLD-SCNC: 3.5 MMOL/L (ref 3.4–5.3)
POTASSIUM BLD-SCNC: 3.9 MMOL/L (ref 3.4–5.3)
POTASSIUM BLD-SCNC: 4.4 MMOL/L (ref 3.4–5.3)
POTASSIUM SERPL-SCNC: 3.1 MMOL/L (ref 3.4–5.3)
POTASSIUM SERPL-SCNC: 3.2 MMOL/L (ref 3.4–5.3)
POTASSIUM SERPL-SCNC: 3.2 MMOL/L (ref 3.4–5.3)
POTASSIUM SERPL-SCNC: 3.6 MMOL/L (ref 3.4–5.3)
PREALB SERPL IA-MCNC: 31 MG/DL (ref 15–45)
PROMYELOCYTES # BLD MANUAL: 0.4 10E9/L
PROMYELOCYTES NFR BLD MANUAL: 2.7 %
PROT SERPL-MCNC: 4.6 G/DL (ref 6.8–8.8)
PROT SERPL-MCNC: 4.9 G/DL (ref 6.8–8.8)
PROT SERPL-MCNC: 5 G/DL (ref 6.8–8.8)
R TIME UNTIL CLOT FORMS: 4.5 MIN (ref 4–9)
R TIME UNTIL CLOT FORMS: NORMAL MIN (ref 4–9)
RBC # BLD AUTO: 2.74 10E12/L (ref 4.4–5.9)
RBC # BLD AUTO: 2.98 10E12/L (ref 4.4–5.9)
RBC # BLD AUTO: 3.37 10E12/L (ref 4.4–5.9)
RBC # BLD AUTO: 3.44 10E12/L (ref 4.4–5.9)
RBC # BLD AUTO: 4.67 10E12/L (ref 4.4–5.9)
RBC MORPH BLD: NORMAL
SODIUM BLD-SCNC: 140 MMOL/L (ref 133–144)
SODIUM BLD-SCNC: 141 MMOL/L (ref 133–144)
SODIUM BLD-SCNC: 142 MMOL/L (ref 133–144)
SODIUM BLD-SCNC: 143 MMOL/L (ref 133–144)
SODIUM BLD-SCNC: 147 MMOL/L (ref 133–144)
SODIUM SERPL-SCNC: 140 MMOL/L (ref 133–144)
SODIUM SERPL-SCNC: 146 MMOL/L (ref 133–144)
SODIUM SERPL-SCNC: 148 MMOL/L (ref 133–144)
SODIUM SERPL-SCNC: 149 MMOL/L (ref 133–144)
TRANSFUSION STATUS PATIENT QL: NORMAL
WBC # BLD AUTO: 15.7 10E9/L (ref 4–11)
WBC # BLD AUTO: 6.2 10E9/L (ref 4–11)
WBC # BLD AUTO: 7.2 10E9/L (ref 4–11)
WBC # BLD AUTO: 8.7 10E9/L (ref 4–11)
WBC # BLD AUTO: 9.7 10E9/L (ref 4–11)

## 2018-12-31 PROCEDURE — 25000132 ZZH RX MED GY IP 250 OP 250 PS 637

## 2018-12-31 PROCEDURE — 93282 PRGRMG EVAL IMPLANTABLE DFB: CPT | Mod: 26 | Performed by: INTERNAL MEDICINE

## 2018-12-31 PROCEDURE — 27210447 ZZH PACK CELL SAVER CSP: Performed by: SURGERY

## 2018-12-31 PROCEDURE — 83605 ASSAY OF LACTIC ACID: CPT | Performed by: THORACIC SURGERY (CARDIOTHORACIC VASCULAR SURGERY)

## 2018-12-31 PROCEDURE — 84132 ASSAY OF SERUM POTASSIUM: CPT | Performed by: ANESTHESIOLOGY

## 2018-12-31 PROCEDURE — 25000125 ZZHC RX 250

## 2018-12-31 PROCEDURE — 02HA0QZ INSERTION OF IMPLANTABLE HEART ASSIST SYSTEM INTO HEART, OPEN APPROACH: ICD-10-PCS | Performed by: SURGERY

## 2018-12-31 PROCEDURE — 85396 CLOTTING ASSAY WHOLE BLOOD: CPT | Performed by: INTERNAL MEDICINE

## 2018-12-31 PROCEDURE — 25000125 ZZHC RX 250: Performed by: INTERNAL MEDICINE

## 2018-12-31 PROCEDURE — 99291 CRITICAL CARE FIRST HOUR: CPT | Mod: GC | Performed by: ANESTHESIOLOGY

## 2018-12-31 PROCEDURE — 36000074 ZZH SURGERY LEVEL 6 1ST 30 MIN - UMMC: Performed by: SURGERY

## 2018-12-31 PROCEDURE — 82805 BLOOD GASES W/O2 SATURATION: CPT | Performed by: THORACIC SURGERY (CARDIOTHORACIC VASCULAR SURGERY)

## 2018-12-31 PROCEDURE — 25000128 H RX IP 250 OP 636: Performed by: SURGERY

## 2018-12-31 PROCEDURE — 27410275 ZZH DEVICE HM III CONTROLLER, INITIAL ONLY, EACH

## 2018-12-31 PROCEDURE — 5A02216 ASSISTANCE WITH CARDIAC OUTPUT USING OTHER PUMP, CONTINUOUS: ICD-10-PCS | Performed by: SURGERY

## 2018-12-31 PROCEDURE — 84100 ASSAY OF PHOSPHORUS: CPT | Performed by: SURGERY

## 2018-12-31 PROCEDURE — 84295 ASSAY OF SERUM SODIUM: CPT | Performed by: ANESTHESIOLOGY

## 2018-12-31 PROCEDURE — 25000125 ZZHC RX 250: Performed by: ANESTHESIOLOGY

## 2018-12-31 PROCEDURE — 27210460 ZZH PUMP APP ADULT PERFUSION: Performed by: SURGERY

## 2018-12-31 PROCEDURE — 25000128 H RX IP 250 OP 636: Performed by: INTERNAL MEDICINE

## 2018-12-31 PROCEDURE — 37000009 ZZH ANESTHESIA TECHNICAL FEE, EACH ADDTL 15 MIN: Performed by: SURGERY

## 2018-12-31 PROCEDURE — 36000076 ZZH SURGERY LEVEL 6 EA 15 ADDTL MIN - UMMC: Performed by: SURGERY

## 2018-12-31 PROCEDURE — 93005 ELECTROCARDIOGRAM TRACING: CPT

## 2018-12-31 PROCEDURE — 88305 TISSUE EXAM BY PATHOLOGIST: CPT | Performed by: SURGERY

## 2018-12-31 PROCEDURE — 25000125 ZZHC RX 250: Performed by: NURSE ANESTHETIST, CERTIFIED REGISTERED

## 2018-12-31 PROCEDURE — 25000125 ZZHC RX 250: Performed by: SURGERY

## 2018-12-31 PROCEDURE — 27410245 ZZH DEVICE HM II POCKET BATTERY HOLSTER, INITIAL ONLY

## 2018-12-31 PROCEDURE — P9041 ALBUMIN (HUMAN),5%, 50ML: HCPCS

## 2018-12-31 PROCEDURE — P9012 CRYOPRECIPITATE EACH UNIT: HCPCS | Performed by: INTERNAL MEDICINE

## 2018-12-31 PROCEDURE — 25000128 H RX IP 250 OP 636

## 2018-12-31 PROCEDURE — 82330 ASSAY OF CALCIUM: CPT | Performed by: ANESTHESIOLOGY

## 2018-12-31 PROCEDURE — 27410276 ZZH DEVICE HM III IMPLANT KIT

## 2018-12-31 PROCEDURE — C9113 INJ PANTOPRAZOLE SODIUM, VIA: HCPCS | Performed by: THORACIC SURGERY (CARDIOTHORACIC VASCULAR SURGERY)

## 2018-12-31 PROCEDURE — 82330 ASSAY OF CALCIUM: CPT | Performed by: THORACIC SURGERY (CARDIOTHORACIC VASCULAR SURGERY)

## 2018-12-31 PROCEDURE — 41000018 ZZH PER-PERFUSION 1ST 30 MIN: Performed by: SURGERY

## 2018-12-31 PROCEDURE — 40000196 ZZH STATISTIC RAPCV CVP MONITORING

## 2018-12-31 PROCEDURE — 83605 ASSAY OF LACTIC ACID: CPT | Performed by: ANESTHESIOLOGY

## 2018-12-31 PROCEDURE — 27410241 ZZH DEVICE HM II 14-V LITHIUM BATTERY, INITIAL ONLY, EACH

## 2018-12-31 PROCEDURE — 83735 ASSAY OF MAGNESIUM: CPT | Performed by: INTERNAL MEDICINE

## 2018-12-31 PROCEDURE — 27410246 ZZH DEVICE HM II POCKET SHOWER BAG, INITIAL ONLY, EACH

## 2018-12-31 PROCEDURE — 83735 ASSAY OF MAGNESIUM: CPT | Performed by: THORACIC SURGERY (CARDIOTHORACIC VASCULAR SURGERY)

## 2018-12-31 PROCEDURE — 82803 BLOOD GASES ANY COMBINATION: CPT | Performed by: ANESTHESIOLOGY

## 2018-12-31 PROCEDURE — 83735 ASSAY OF MAGNESIUM: CPT | Performed by: SURGERY

## 2018-12-31 PROCEDURE — 25000125 ZZHC RX 250: Performed by: THORACIC SURGERY (CARDIOTHORACIC VASCULAR SURGERY)

## 2018-12-31 PROCEDURE — 94002 VENT MGMT INPAT INIT DAY: CPT

## 2018-12-31 PROCEDURE — 00000146 ZZHCL STATISTIC GLUCOSE BY METER IP

## 2018-12-31 PROCEDURE — 82805 BLOOD GASES W/O2 SATURATION: CPT | Performed by: STUDENT IN AN ORGANIZED HEALTH CARE EDUCATION/TRAINING PROGRAM

## 2018-12-31 PROCEDURE — 25000128 H RX IP 250 OP 636: Performed by: ANESTHESIOLOGY

## 2018-12-31 PROCEDURE — 85384 FIBRINOGEN ACTIVITY: CPT | Performed by: INTERNAL MEDICINE

## 2018-12-31 PROCEDURE — 93010 ELECTROCARDIOGRAM REPORT: CPT | Performed by: INTERNAL MEDICINE

## 2018-12-31 PROCEDURE — P9073 PLATELETS PHERESIS PATH REDU: HCPCS | Performed by: INTERNAL MEDICINE

## 2018-12-31 PROCEDURE — P9041 ALBUMIN (HUMAN),5%, 50ML: HCPCS | Performed by: THORACIC SURGERY (CARDIOTHORACIC VASCULAR SURGERY)

## 2018-12-31 PROCEDURE — 25000132 ZZH RX MED GY IP 250 OP 250 PS 637: Performed by: THORACIC SURGERY (CARDIOTHORACIC VASCULAR SURGERY)

## 2018-12-31 PROCEDURE — 25000128 H RX IP 250 OP 636: Performed by: STUDENT IN AN ORGANIZED HEALTH CARE EDUCATION/TRAINING PROGRAM

## 2018-12-31 PROCEDURE — 25000128 H RX IP 250 OP 636: Performed by: NURSE ANESTHETIST, CERTIFIED REGISTERED

## 2018-12-31 PROCEDURE — 82947 ASSAY GLUCOSE BLOOD QUANT: CPT | Performed by: ANESTHESIOLOGY

## 2018-12-31 PROCEDURE — 40000986 XR CHEST PORT 1 VW

## 2018-12-31 PROCEDURE — 40000048 ZZH STATISTIC DAILY SWAN MONITORING

## 2018-12-31 PROCEDURE — 84100 ASSAY OF PHOSPHORUS: CPT | Performed by: INTERNAL MEDICINE

## 2018-12-31 PROCEDURE — 20000004 ZZH R&B ICU UMMC

## 2018-12-31 PROCEDURE — 27210995 ZZH RX 272: Performed by: SURGERY

## 2018-12-31 PROCEDURE — 25000565 ZZH ISOFLURANE, EA 15 MIN: Performed by: SURGERY

## 2018-12-31 PROCEDURE — 40000344 ZZHCL STATISTIC THAWING COMPONENT: Performed by: INTERNAL MEDICINE

## 2018-12-31 PROCEDURE — 37000008 ZZH ANESTHESIA TECHNICAL FEE, 1ST 30 MIN: Performed by: SURGERY

## 2018-12-31 PROCEDURE — 82805 BLOOD GASES W/O2 SATURATION: CPT | Performed by: INTERNAL MEDICINE

## 2018-12-31 PROCEDURE — 25000132 ZZH RX MED GY IP 250 OP 250 PS 637: Performed by: STUDENT IN AN ORGANIZED HEALTH CARE EDUCATION/TRAINING PROGRAM

## 2018-12-31 PROCEDURE — 27410274 ZZH DEVICE HM III POWER UNIT MOBILE W/AC PWR CABLE, INITIAL ONLY, EACH

## 2018-12-31 PROCEDURE — 27410254 ZZH DEVICE HM II UNIVERSAL BATTERY CHARGER, INITIAL ONLY, EACH

## 2018-12-31 PROCEDURE — 85730 THROMBOPLASTIN TIME PARTIAL: CPT | Performed by: THORACIC SURGERY (CARDIOTHORACIC VASCULAR SURGERY)

## 2018-12-31 PROCEDURE — P9037 PLATE PHERES LEUKOREDU IRRAD: HCPCS | Performed by: INTERNAL MEDICINE

## 2018-12-31 PROCEDURE — 85730 THROMBOPLASTIN TIME PARTIAL: CPT | Performed by: INTERNAL MEDICINE

## 2018-12-31 PROCEDURE — 84100 ASSAY OF PHOSPHORUS: CPT | Performed by: THORACIC SURGERY (CARDIOTHORACIC VASCULAR SURGERY)

## 2018-12-31 PROCEDURE — C1768 GRAFT, VASCULAR: HCPCS | Performed by: SURGERY

## 2018-12-31 PROCEDURE — 80053 COMPREHEN METABOLIC PANEL: CPT | Performed by: THORACIC SURGERY (CARDIOTHORACIC VASCULAR SURGERY)

## 2018-12-31 PROCEDURE — P9041 ALBUMIN (HUMAN),5%, 50ML: HCPCS | Performed by: SURGERY

## 2018-12-31 PROCEDURE — 85025 COMPLETE CBC W/AUTO DIFF WBC: CPT | Performed by: INTERNAL MEDICINE

## 2018-12-31 PROCEDURE — 85610 PROTHROMBIN TIME: CPT | Performed by: INTERNAL MEDICINE

## 2018-12-31 PROCEDURE — 5A1221Z PERFORMANCE OF CARDIAC OUTPUT, CONTINUOUS: ICD-10-PCS | Performed by: SURGERY

## 2018-12-31 PROCEDURE — 85027 COMPLETE CBC AUTOMATED: CPT | Performed by: SURGERY

## 2018-12-31 PROCEDURE — P9016 RBC LEUKOCYTES REDUCED: HCPCS | Performed by: SURGERY

## 2018-12-31 PROCEDURE — 25000132 ZZH RX MED GY IP 250 OP 250 PS 637: Performed by: SURGERY

## 2018-12-31 PROCEDURE — 85027 COMPLETE CBC AUTOMATED: CPT | Performed by: INTERNAL MEDICINE

## 2018-12-31 PROCEDURE — 99291 CRITICAL CARE FIRST HOUR: CPT | Mod: 25 | Performed by: INTERNAL MEDICINE

## 2018-12-31 PROCEDURE — 85610 PROTHROMBIN TIME: CPT | Performed by: THORACIC SURGERY (CARDIOTHORACIC VASCULAR SURGERY)

## 2018-12-31 PROCEDURE — 41000019 ZZH PERA-PERFUSION EACH ADDTL 15 MIN: Performed by: SURGERY

## 2018-12-31 PROCEDURE — P9059 PLASMA, FRZ BETWEEN 8-24HOUR: HCPCS | Performed by: INTERNAL MEDICINE

## 2018-12-31 PROCEDURE — 80048 BASIC METABOLIC PNL TOTAL CA: CPT | Performed by: INTERNAL MEDICINE

## 2018-12-31 PROCEDURE — 85027 COMPLETE CBC AUTOMATED: CPT | Performed by: THORACIC SURGERY (CARDIOTHORACIC VASCULAR SURGERY)

## 2018-12-31 PROCEDURE — 3E043XZ INTRODUCTION OF VASOPRESSOR INTO CENTRAL VEIN, PERCUTANEOUS APPROACH: ICD-10-PCS | Performed by: ANESTHESIOLOGY

## 2018-12-31 PROCEDURE — 40000275 ZZH STATISTIC RCP TIME EA 10 MIN

## 2018-12-31 PROCEDURE — 25000128 H RX IP 250 OP 636: Performed by: THORACIC SURGERY (CARDIOTHORACIC VASCULAR SURGERY)

## 2018-12-31 PROCEDURE — 5A1945Z RESPIRATORY VENTILATION, 24-96 CONSECUTIVE HOURS: ICD-10-PCS | Performed by: ANESTHESIOLOGY

## 2018-12-31 PROCEDURE — 84132 ASSAY OF SERUM POTASSIUM: CPT | Performed by: THORACIC SURGERY (CARDIOTHORACIC VASCULAR SURGERY)

## 2018-12-31 PROCEDURE — 27410239 ZZH DEVICE HM II 14-V LITH BATTERY CLIP BOX, INITIAL ONLY

## 2018-12-31 PROCEDURE — 40000014 ZZH STATISTIC ARTERIAL MONITORING DAILY

## 2018-12-31 PROCEDURE — 93282 PRGRMG EVAL IMPLANTABLE DFB: CPT

## 2018-12-31 PROCEDURE — 27210794 ZZH OR GENERAL SUPPLY STERILE: Performed by: SURGERY

## 2018-12-31 PROCEDURE — 80053 COMPREHEN METABOLIC PANEL: CPT | Performed by: SURGERY

## 2018-12-31 DEVICE — GRAFT GORTEX 18MMX40CM STRETCH SA1804
Type: IMPLANTABLE DEVICE | Status: NON-FUNCTIONAL
Removed: 2020-05-18

## 2018-12-31 DEVICE — IMPLANTABLE DEVICE
Type: IMPLANTABLE DEVICE | Site: HEART | Status: NON-FUNCTIONAL
Removed: 2020-05-18

## 2018-12-31 RX ORDER — SODIUM CHLORIDE 9 MG/ML
INJECTION, SOLUTION INTRAVENOUS CONTINUOUS PRN
Status: DISCONTINUED | OUTPATIENT
Start: 2018-12-31 | End: 2018-12-31

## 2018-12-31 RX ORDER — ALBUMIN, HUMAN INJ 5% 5 %
12.5 SOLUTION INTRAVENOUS ONCE
Status: COMPLETED | OUTPATIENT
Start: 2018-12-31 | End: 2018-12-31

## 2018-12-31 RX ORDER — ETOMIDATE 2 MG/ML
INJECTION INTRAVENOUS PRN
Status: DISCONTINUED | OUTPATIENT
Start: 2018-12-31 | End: 2018-12-31

## 2018-12-31 RX ORDER — SODIUM CHLORIDE, SODIUM LACTATE, POTASSIUM CHLORIDE, CALCIUM CHLORIDE 600; 310; 30; 20 MG/100ML; MG/100ML; MG/100ML; MG/100ML
INJECTION, SOLUTION INTRAVENOUS CONTINUOUS PRN
Status: DISCONTINUED | OUTPATIENT
Start: 2018-12-31 | End: 2018-12-31

## 2018-12-31 RX ORDER — POTASSIUM CHLORIDE 750 MG/1
20-40 TABLET, EXTENDED RELEASE ORAL
Status: DISCONTINUED | OUTPATIENT
Start: 2018-12-31 | End: 2019-01-09

## 2018-12-31 RX ORDER — BISACODYL 10 MG
10 SUPPOSITORY, RECTAL RECTAL DAILY PRN
Status: DISCONTINUED | OUTPATIENT
Start: 2018-12-31 | End: 2019-01-11 | Stop reason: HOSPADM

## 2018-12-31 RX ORDER — FENTANYL CITRATE 50 UG/ML
25-50 INJECTION, SOLUTION INTRAMUSCULAR; INTRAVENOUS
Status: DISCONTINUED | OUTPATIENT
Start: 2018-12-31 | End: 2019-01-04 | Stop reason: CLARIF

## 2018-12-31 RX ORDER — HEPARIN SODIUM 1000 [USP'U]/ML
INJECTION, SOLUTION INTRAVENOUS; SUBCUTANEOUS PRN
Status: DISCONTINUED | OUTPATIENT
Start: 2018-12-31 | End: 2018-12-31

## 2018-12-31 RX ORDER — NICOTINE POLACRILEX 4 MG
15-30 LOZENGE BUCCAL
Status: DISCONTINUED | OUTPATIENT
Start: 2018-12-31 | End: 2019-01-02

## 2018-12-31 RX ORDER — MAGNESIUM SULFATE HEPTAHYDRATE 40 MG/ML
2 INJECTION, SOLUTION INTRAVENOUS DAILY PRN
Status: DISCONTINUED | OUTPATIENT
Start: 2018-12-31 | End: 2018-12-31

## 2018-12-31 RX ORDER — ACETAMINOPHEN 325 MG/1
650 TABLET ORAL EVERY 4 HOURS PRN
Status: DISCONTINUED | OUTPATIENT
Start: 2019-01-03 | End: 2019-01-11 | Stop reason: HOSPADM

## 2018-12-31 RX ORDER — PROCHLORPERAZINE MALEATE 5 MG
10 TABLET ORAL EVERY 6 HOURS PRN
Status: DISCONTINUED | OUTPATIENT
Start: 2018-12-31 | End: 2019-01-11 | Stop reason: HOSPADM

## 2018-12-31 RX ORDER — FENTANYL CITRATE 50 UG/ML
INJECTION, SOLUTION INTRAMUSCULAR; INTRAVENOUS PRN
Status: DISCONTINUED | OUTPATIENT
Start: 2018-12-31 | End: 2018-12-31

## 2018-12-31 RX ORDER — PROPOFOL 10 MG/ML
5-75 INJECTION, EMULSION INTRAVENOUS CONTINUOUS
Status: DISCONTINUED | OUTPATIENT
Start: 2018-12-31 | End: 2019-01-02

## 2018-12-31 RX ORDER — DEXMEDETOMIDINE HYDROCHLORIDE 4 UG/ML
0.2-0.7 INJECTION, SOLUTION INTRAVENOUS CONTINUOUS
Status: DISCONTINUED | OUTPATIENT
Start: 2018-12-31 | End: 2019-01-02

## 2018-12-31 RX ORDER — LIDOCAINE HYDROCHLORIDE 20 MG/ML
INJECTION, SOLUTION INFILTRATION; PERINEURAL PRN
Status: DISCONTINUED | OUTPATIENT
Start: 2018-12-31 | End: 2018-12-31

## 2018-12-31 RX ORDER — POTASSIUM CHLORIDE 750 MG/1
40 TABLET, EXTENDED RELEASE ORAL ONCE
Status: COMPLETED | OUTPATIENT
Start: 2018-12-31 | End: 2018-12-31

## 2018-12-31 RX ORDER — POTASSIUM CHLORIDE 29.8 MG/ML
INJECTION INTRAVENOUS PRN
Status: DISCONTINUED | OUTPATIENT
Start: 2018-12-31 | End: 2018-12-31

## 2018-12-31 RX ORDER — MUPIROCIN 20 MG/G
1 OINTMENT TOPICAL 2 TIMES DAILY
Status: DISPENSED | OUTPATIENT
Start: 2018-12-31 | End: 2019-01-04

## 2018-12-31 RX ORDER — CALCIUM CHLORIDE 100 MG/ML
INJECTION INTRAVENOUS; INTRAVENTRICULAR PRN
Status: DISCONTINUED | OUTPATIENT
Start: 2018-12-31 | End: 2018-12-31

## 2018-12-31 RX ORDER — AMOXICILLIN 250 MG
2 CAPSULE ORAL 2 TIMES DAILY
Status: DISCONTINUED | OUTPATIENT
Start: 2018-12-31 | End: 2019-01-11 | Stop reason: HOSPADM

## 2018-12-31 RX ORDER — MEPERIDINE HYDROCHLORIDE 50 MG/ML
12.5-25 INJECTION INTRAMUSCULAR; INTRAVENOUS; SUBCUTANEOUS
Status: DISCONTINUED | OUTPATIENT
Start: 2018-12-31 | End: 2019-01-04 | Stop reason: CLARIF

## 2018-12-31 RX ORDER — POTASSIUM CHLORIDE 1.5 G/1.58G
20-40 POWDER, FOR SOLUTION ORAL
Status: DISCONTINUED | OUTPATIENT
Start: 2018-12-31 | End: 2019-01-09

## 2018-12-31 RX ORDER — AMOXICILLIN 250 MG
1 CAPSULE ORAL 2 TIMES DAILY
Status: DISCONTINUED | OUTPATIENT
Start: 2018-12-31 | End: 2019-01-11 | Stop reason: HOSPADM

## 2018-12-31 RX ORDER — MAGNESIUM SULFATE HEPTAHYDRATE 40 MG/ML
4 INJECTION, SOLUTION INTRAVENOUS EVERY 4 HOURS PRN
Status: DISCONTINUED | OUTPATIENT
Start: 2018-12-31 | End: 2018-12-31

## 2018-12-31 RX ORDER — MILRINONE LACTATE 0.2 MG/ML
0.25-0.75 INJECTION, SOLUTION INTRAVENOUS CONTINUOUS
Status: DISCONTINUED | OUTPATIENT
Start: 2018-12-31 | End: 2018-12-31

## 2018-12-31 RX ORDER — ALBUMIN, HUMAN INJ 5% 5 %
500-1000 SOLUTION INTRAVENOUS
Status: COMPLETED | OUTPATIENT
Start: 2018-12-31 | End: 2018-12-31

## 2018-12-31 RX ORDER — DEXTROSE MONOHYDRATE, SODIUM CHLORIDE, AND POTASSIUM CHLORIDE 50; 1.49; 4.5 G/1000ML; G/1000ML; G/1000ML
INJECTION, SOLUTION INTRAVENOUS CONTINUOUS
Status: DISCONTINUED | OUTPATIENT
Start: 2018-12-31 | End: 2018-12-31

## 2018-12-31 RX ORDER — ONDANSETRON 2 MG/ML
4 INJECTION INTRAMUSCULAR; INTRAVENOUS EVERY 6 HOURS PRN
Status: DISCONTINUED | OUTPATIENT
Start: 2018-12-31 | End: 2019-01-09

## 2018-12-31 RX ORDER — ACETAMINOPHEN 325 MG/1
975 TABLET ORAL EVERY 8 HOURS
Status: DISCONTINUED | OUTPATIENT
Start: 2018-12-31 | End: 2019-01-03

## 2018-12-31 RX ORDER — ASPIRIN 81 MG/1
81 TABLET, CHEWABLE ORAL DAILY
Status: DISCONTINUED | OUTPATIENT
Start: 2019-01-01 | End: 2019-01-05

## 2018-12-31 RX ORDER — PROPOFOL 10 MG/ML
10-20 INJECTION, EMULSION INTRAVENOUS EVERY 30 MIN PRN
Status: DISCONTINUED | OUTPATIENT
Start: 2018-12-31 | End: 2019-01-02

## 2018-12-31 RX ORDER — ONDANSETRON 4 MG/1
4 TABLET, ORALLY DISINTEGRATING ORAL EVERY 6 HOURS PRN
Status: DISCONTINUED | OUTPATIENT
Start: 2018-12-31 | End: 2019-01-11 | Stop reason: HOSPADM

## 2018-12-31 RX ORDER — PROPOFOL 10 MG/ML
5-75 INJECTION, EMULSION INTRAVENOUS CONTINUOUS
Status: DISCONTINUED | OUTPATIENT
Start: 2018-12-31 | End: 2018-12-31 | Stop reason: HOSPADM

## 2018-12-31 RX ORDER — VANCOMYCIN HYDROCHLORIDE 1 G/200ML
1000 INJECTION, SOLUTION INTRAVENOUS EVERY 12 HOURS
Status: DISPENSED | OUTPATIENT
Start: 2018-12-31 | End: 2019-01-02

## 2018-12-31 RX ORDER — ALBUMIN, HUMAN INJ 5% 5 %
SOLUTION INTRAVENOUS
Status: COMPLETED
Start: 2018-12-31 | End: 2018-12-31

## 2018-12-31 RX ORDER — FLUCONAZOLE 2 MG/ML
200 INJECTION, SOLUTION INTRAVENOUS EVERY 24 HOURS
Status: COMPLETED | OUTPATIENT
Start: 2018-12-31 | End: 2019-01-01

## 2018-12-31 RX ORDER — NALOXONE HYDROCHLORIDE 0.4 MG/ML
.1-.4 INJECTION, SOLUTION INTRAMUSCULAR; INTRAVENOUS; SUBCUTANEOUS
Status: DISCONTINUED | OUTPATIENT
Start: 2018-12-31 | End: 2019-01-11 | Stop reason: HOSPADM

## 2018-12-31 RX ORDER — NALOXONE HYDROCHLORIDE 0.4 MG/ML
.1-.4 INJECTION, SOLUTION INTRAMUSCULAR; INTRAVENOUS; SUBCUTANEOUS
Status: DISCONTINUED | OUTPATIENT
Start: 2018-12-31 | End: 2018-12-31

## 2018-12-31 RX ORDER — PROPOFOL 10 MG/ML
INJECTION, EMULSION INTRAVENOUS CONTINUOUS PRN
Status: DISCONTINUED | OUTPATIENT
Start: 2018-12-31 | End: 2018-12-31

## 2018-12-31 RX ORDER — POTASSIUM CL/LIDO/0.9 % NACL 10MEQ/0.1L
10 INTRAVENOUS SOLUTION, PIGGYBACK (ML) INTRAVENOUS
Status: DISCONTINUED | OUTPATIENT
Start: 2018-12-31 | End: 2019-01-09

## 2018-12-31 RX ORDER — OXYCODONE HYDROCHLORIDE 5 MG/1
5-10 TABLET ORAL EVERY 4 HOURS PRN
Status: DISCONTINUED | OUTPATIENT
Start: 2018-12-31 | End: 2019-01-11 | Stop reason: HOSPADM

## 2018-12-31 RX ORDER — GABAPENTIN 100 MG/1
100 CAPSULE ORAL 3 TIMES DAILY
Status: DISPENSED | OUTPATIENT
Start: 2018-12-31 | End: 2019-01-03

## 2018-12-31 RX ORDER — POTASSIUM CHLORIDE 29.8 MG/ML
20 INJECTION INTRAVENOUS
Status: DISCONTINUED | OUTPATIENT
Start: 2018-12-31 | End: 2019-01-09

## 2018-12-31 RX ORDER — MAGNESIUM SULFATE HEPTAHYDRATE 40 MG/ML
2 INJECTION, SOLUTION INTRAVENOUS DAILY PRN
Status: DISCONTINUED | OUTPATIENT
Start: 2018-12-31 | End: 2019-01-11 | Stop reason: HOSPADM

## 2018-12-31 RX ORDER — POLYETHYLENE GLYCOL 3350 17 G/17G
17 POWDER, FOR SOLUTION ORAL 2 TIMES DAILY
Status: DISCONTINUED | OUTPATIENT
Start: 2019-01-01 | End: 2019-01-11 | Stop reason: HOSPADM

## 2018-12-31 RX ORDER — LEVOFLOXACIN 5 MG/ML
500 INJECTION, SOLUTION INTRAVENOUS EVERY 24 HOURS
Status: COMPLETED | OUTPATIENT
Start: 2018-12-31 | End: 2019-01-01

## 2018-12-31 RX ORDER — HYDRALAZINE HYDROCHLORIDE 20 MG/ML
10 INJECTION INTRAMUSCULAR; INTRAVENOUS EVERY 30 MIN PRN
Status: DISCONTINUED | OUTPATIENT
Start: 2018-12-31 | End: 2019-01-04

## 2018-12-31 RX ORDER — MAGNESIUM SULFATE HEPTAHYDRATE 40 MG/ML
4 INJECTION, SOLUTION INTRAVENOUS EVERY 4 HOURS PRN
Status: DISCONTINUED | OUTPATIENT
Start: 2018-12-31 | End: 2019-01-11 | Stop reason: HOSPADM

## 2018-12-31 RX ORDER — POTASSIUM CHLORIDE 7.45 MG/ML
10 INJECTION INTRAVENOUS
Status: DISCONTINUED | OUTPATIENT
Start: 2018-12-31 | End: 2019-01-09

## 2018-12-31 RX ORDER — DEXMEDETOMIDINE HYDROCHLORIDE 4 UG/ML
0.2-0.7 INJECTION, SOLUTION INTRAVENOUS CONTINUOUS
Status: DISCONTINUED | OUTPATIENT
Start: 2018-12-31 | End: 2018-12-31 | Stop reason: HOSPADM

## 2018-12-31 RX ORDER — LIDOCAINE 4 G/G
2 PATCH TOPICAL
Status: DISCONTINUED | OUTPATIENT
Start: 2019-01-01 | End: 2019-01-11 | Stop reason: HOSPADM

## 2018-12-31 RX ORDER — METHOCARBAMOL 750 MG/1
750 TABLET, FILM COATED ORAL 4 TIMES DAILY PRN
Status: DISCONTINUED | OUTPATIENT
Start: 2018-12-31 | End: 2019-01-11 | Stop reason: HOSPADM

## 2018-12-31 RX ORDER — PROTAMINE SULFATE 10 MG/ML
INJECTION, SOLUTION INTRAVENOUS PRN
Status: DISCONTINUED | OUTPATIENT
Start: 2018-12-31 | End: 2018-12-31

## 2018-12-31 RX ORDER — DEXTROSE MONOHYDRATE 25 G/50ML
25-50 INJECTION, SOLUTION INTRAVENOUS
Status: DISCONTINUED | OUTPATIENT
Start: 2018-12-31 | End: 2019-01-02

## 2018-12-31 RX ORDER — LIDOCAINE 40 MG/G
CREAM TOPICAL
Status: DISCONTINUED | OUTPATIENT
Start: 2018-12-31 | End: 2019-01-11 | Stop reason: HOSPADM

## 2018-12-31 RX ADMIN — POTASSIUM & SODIUM PHOSPHATES POWDER PACK 280-160-250 MG 2 PACKET: 280-160-250 PACK at 20:51

## 2018-12-31 RX ADMIN — SODIUM CHLORIDE 600 MG: 9 INJECTION, SOLUTION INTRAVENOUS at 07:40

## 2018-12-31 RX ADMIN — MIDAZOLAM 1 MG: 1 INJECTION INTRAMUSCULAR; INTRAVENOUS at 07:20

## 2018-12-31 RX ADMIN — CALCIUM CHLORIDE 500 MG: 100 INJECTION, SOLUTION INTRAVENOUS at 11:37

## 2018-12-31 RX ADMIN — LIDOCAINE HYDROCHLORIDE 100 MG: 20 INJECTION, SOLUTION INFILTRATION; PERINEURAL at 07:09

## 2018-12-31 RX ADMIN — ROSUVASTATIN CALCIUM 20 MG: 20 TABLET, FILM COATED ORAL at 20:35

## 2018-12-31 RX ADMIN — LEVOFLOXACIN 500 MG: 5 INJECTION, SOLUTION INTRAVENOUS at 14:56

## 2018-12-31 RX ADMIN — SODIUM CHLORIDE, POTASSIUM CHLORIDE, SODIUM LACTATE AND CALCIUM CHLORIDE 500 ML: 600; 310; 30; 20 INJECTION, SOLUTION INTRAVENOUS at 16:46

## 2018-12-31 RX ADMIN — SENNOSIDES AND DOCUSATE SODIUM 1 TABLET: 8.6; 5 TABLET ORAL at 20:35

## 2018-12-31 RX ADMIN — CALCIUM CHLORIDE 1000 MG: 100 INJECTION, SOLUTION INTRAVENOUS at 12:01

## 2018-12-31 RX ADMIN — SODIUM BICARBONATE 100 MEQ: 84 INJECTION INTRAVENOUS at 17:28

## 2018-12-31 RX ADMIN — SODIUM CHLORIDE 600 MG: 9 INJECTION, SOLUTION INTRAVENOUS at 16:25

## 2018-12-31 RX ADMIN — AMINOCAPROIC ACID 5 G: 250 INJECTION, SOLUTION INTRAVENOUS at 08:13

## 2018-12-31 RX ADMIN — VASOPRESSIN 2.4 UNITS/HR: 20 INJECTION INTRAVENOUS at 09:15

## 2018-12-31 RX ADMIN — LIDOCAINE HYDROCHLORIDE 100 MG: 20 INJECTION, SOLUTION INFILTRATION; PERINEURAL at 07:20

## 2018-12-31 RX ADMIN — PROPOFOL 50 MCG/KG/MIN: 10 INJECTION, EMULSION INTRAVENOUS at 11:55

## 2018-12-31 RX ADMIN — DEXMEDETOMIDINE HYDROCHLORIDE 0.4 MCG/KG/HR: 400 INJECTION INTRAVENOUS at 07:45

## 2018-12-31 RX ADMIN — SUGAMMADEX 200 MG: 100 INJECTION, SOLUTION INTRAVENOUS at 12:38

## 2018-12-31 RX ADMIN — POTASSIUM CHLORIDE 20 MEQ: 29.8 INJECTION, SOLUTION INTRAVENOUS at 14:23

## 2018-12-31 RX ADMIN — SODIUM CHLORIDE: 9 INJECTION, SOLUTION INTRAVENOUS at 11:21

## 2018-12-31 RX ADMIN — HEPARIN SODIUM 30400 UNITS: 1000 INJECTION, SOLUTION INTRAVENOUS; SUBCUTANEOUS at 08:21

## 2018-12-31 RX ADMIN — ROCURONIUM BROMIDE 20 MG: 10 INJECTION INTRAVENOUS at 11:56

## 2018-12-31 RX ADMIN — POTASSIUM CHLORIDE 20 MEQ: 400 INJECTION, SOLUTION INTRAVENOUS at 10:55

## 2018-12-31 RX ADMIN — SODIUM PHOSPHATE, MONOBASIC, MONOHYDRATE AND SODIUM PHOSPHATE, DIBASIC, ANHYDROUS 25 MMOL: 276; 142 INJECTION, SOLUTION INTRAVENOUS at 22:00

## 2018-12-31 RX ADMIN — POTASSIUM CHLORIDE 20 MEQ: 29.8 INJECTION, SOLUTION INTRAVENOUS at 16:00

## 2018-12-31 RX ADMIN — LEVOFLOXACIN 500 MG: 5 INJECTION, SOLUTION INTRAVENOUS at 07:40

## 2018-12-31 RX ADMIN — SODIUM BICARBONATE 50 MEQ: 84 INJECTION, SOLUTION INTRAVENOUS at 11:46

## 2018-12-31 RX ADMIN — ACETAMINOPHEN 650 MG: 325 TABLET, FILM COATED ORAL at 00:12

## 2018-12-31 RX ADMIN — HUMAN INSULIN 12 UNITS/HR: 100 INJECTION, SOLUTION SUBCUTANEOUS at 13:33

## 2018-12-31 RX ADMIN — NOREPINEPHRINE BITARTRATE 0.05 MCG/KG/MIN: 1 INJECTION INTRAVENOUS at 09:07

## 2018-12-31 RX ADMIN — HUMAN INSULIN 13 UNITS/HR: 100 INJECTION, SOLUTION SUBCUTANEOUS at 22:40

## 2018-12-31 RX ADMIN — PROPOFOL 50 MCG/KG/MIN: 10 INJECTION, EMULSION INTRAVENOUS at 13:47

## 2018-12-31 RX ADMIN — Medication 100 MEQ: at 17:28

## 2018-12-31 RX ADMIN — MIDAZOLAM 2 MG: 1 INJECTION INTRAMUSCULAR; INTRAVENOUS at 09:49

## 2018-12-31 RX ADMIN — POTASSIUM CHLORIDE 20 MEQ: 1.5 POWDER, FOR SOLUTION ORAL at 18:44

## 2018-12-31 RX ADMIN — Medication 50 MCG/HR: at 14:26

## 2018-12-31 RX ADMIN — VANCOMYCIN HYDROCHLORIDE 1000 MG: 1 INJECTION, SOLUTION INTRAVENOUS at 22:24

## 2018-12-31 RX ADMIN — VASOPRESSIN 1.2 UNITS/HR: 20 INJECTION INTRAVENOUS at 19:24

## 2018-12-31 RX ADMIN — DEXMEDETOMIDINE HYDROCHLORIDE 0.4 MCG/KG/HR: 4 INJECTION, SOLUTION INTRAVENOUS at 19:47

## 2018-12-31 RX ADMIN — AMINOCAPROIC ACID 1 G/HR: 250 INJECTION, SOLUTION INTRAVENOUS at 09:24

## 2018-12-31 RX ADMIN — MAGNESIUM SULFATE HEPTAHYDRATE 2 G: 40 INJECTION, SOLUTION INTRAVENOUS at 20:56

## 2018-12-31 RX ADMIN — PROTAMINE SULFATE 200 MG: 10 INJECTION, SOLUTION INTRAVENOUS at 10:19

## 2018-12-31 RX ADMIN — EPOPROSTENOL SODIUM 20 NG/KG/MIN: 1500000 INJECTION, POWDER, LYOPHILIZED, FOR SOLUTION INTRAVENOUS at 08:18

## 2018-12-31 RX ADMIN — SODIUM CHLORIDE, POTASSIUM CHLORIDE, SODIUM LACTATE AND CALCIUM CHLORIDE: 600; 310; 30; 20 INJECTION, SOLUTION INTRAVENOUS at 07:40

## 2018-12-31 RX ADMIN — FENTANYL CITRATE 50 MCG: 50 INJECTION, SOLUTION INTRAMUSCULAR; INTRAVENOUS at 07:24

## 2018-12-31 RX ADMIN — ALBUMIN HUMAN 500 ML: 0.05 INJECTION, SOLUTION INTRAVENOUS at 14:32

## 2018-12-31 RX ADMIN — FENTANYL CITRATE 50 MCG: 50 INJECTION, SOLUTION INTRAMUSCULAR; INTRAVENOUS at 07:21

## 2018-12-31 RX ADMIN — VASOPRESSIN 3 UNITS: 20 INJECTION, SOLUTION INTRAMUSCULAR; SUBCUTANEOUS at 10:10

## 2018-12-31 RX ADMIN — VANCOMYCIN HYDROCHLORIDE 1000 MG: 1 INJECTION, SOLUTION INTRAVENOUS at 07:40

## 2018-12-31 RX ADMIN — ROCURONIUM BROMIDE 30 MG: 10 INJECTION INTRAVENOUS at 11:26

## 2018-12-31 RX ADMIN — CALCIUM CHLORIDE 500 MG: 100 INJECTION, SOLUTION INTRAVENOUS at 11:33

## 2018-12-31 RX ADMIN — HUMAN INSULIN 15 UNITS/HR: 100 INJECTION, SOLUTION SUBCUTANEOUS at 20:44

## 2018-12-31 RX ADMIN — ETOMIDATE 10 MG: 2 INJECTION INTRAVENOUS at 07:20

## 2018-12-31 RX ADMIN — ACETAMINOPHEN 975 MG: 325 TABLET, FILM COATED ORAL at 20:43

## 2018-12-31 RX ADMIN — HUMAN INSULIN 4 UNITS/HR: 100 INJECTION, SOLUTION SUBCUTANEOUS at 07:45

## 2018-12-31 RX ADMIN — PANTOPRAZOLE SODIUM 40 MG: 40 INJECTION, POWDER, FOR SOLUTION INTRAVENOUS at 14:39

## 2018-12-31 RX ADMIN — PROPOFOL 40 MCG/KG/MIN: 10 INJECTION, EMULSION INTRAVENOUS at 20:15

## 2018-12-31 RX ADMIN — FLUCONAZOLE 200 MG: 2 INJECTION, SOLUTION INTRAVENOUS at 15:22

## 2018-12-31 RX ADMIN — ROCURONIUM BROMIDE 100 MG: 10 INJECTION INTRAVENOUS at 07:22

## 2018-12-31 RX ADMIN — EPINEPHRINE 80 MCG: 1 INJECTION PARENTERAL at 07:20

## 2018-12-31 RX ADMIN — GABAPENTIN 100 MG: 100 CAPSULE ORAL at 20:35

## 2018-12-31 RX ADMIN — ALBUMIN HUMAN 12.5 G: 0.05 INJECTION, SOLUTION INTRAVENOUS at 22:14

## 2018-12-31 RX ADMIN — POTASSIUM CHLORIDE 20 MEQ: 29.8 INJECTION, SOLUTION INTRAVENOUS at 18:43

## 2018-12-31 RX ADMIN — EPINEPHRINE 0.05 MCG/KG/MIN: 1 INJECTION PARENTERAL at 19:23

## 2018-12-31 RX ADMIN — MIDAZOLAM 0.5 MG: 1 INJECTION INTRAMUSCULAR; INTRAVENOUS at 07:21

## 2018-12-31 RX ADMIN — ROCURONIUM BROMIDE 30 MG: 10 INJECTION INTRAVENOUS at 08:56

## 2018-12-31 RX ADMIN — POTASSIUM CHLORIDE 40 MEQ: 750 TABLET, EXTENDED RELEASE ORAL at 06:13

## 2018-12-31 RX ADMIN — SODIUM CHLORIDE: 9 INJECTION, SOLUTION INTRAVENOUS at 07:04

## 2018-12-31 RX ADMIN — EPINEPHRINE 0.05 MCG/KG/MIN: 1 INJECTION PARENTERAL at 07:40

## 2018-12-31 RX ADMIN — HUMAN INSULIN: 100 INJECTION, SOLUTION SUBCUTANEOUS at 11:21

## 2018-12-31 RX ADMIN — DEXMEDETOMIDINE HYDROCHLORIDE 0.4 MCG/KG/HR: 4 INJECTION, SOLUTION INTRAVENOUS at 14:25

## 2018-12-31 RX ADMIN — ALBUMIN HUMAN 12.5 G: 50 SOLUTION INTRAVENOUS at 14:32

## 2018-12-31 RX ADMIN — PHENYLEPHRINE HYDROCHLORIDE 200 MCG: 10 INJECTION, SOLUTION INTRAMUSCULAR; INTRAVENOUS; SUBCUTANEOUS at 08:47

## 2018-12-31 RX ADMIN — POTASSIUM CHLORIDE 20 MEQ: 1.5 POWDER, FOR SOLUTION ORAL at 23:09

## 2018-12-31 RX ADMIN — POTASSIUM CHLORIDE 20 MEQ: 400 INJECTION, SOLUTION INTRAVENOUS at 11:07

## 2018-12-31 RX ADMIN — ALBUMIN HUMAN 12.5 G: 0.05 INJECTION, SOLUTION INTRAVENOUS at 14:32

## 2018-12-31 RX ADMIN — FENTANYL CITRATE 400 MCG: 50 INJECTION, SOLUTION INTRAMUSCULAR; INTRAVENOUS at 08:13

## 2018-12-31 RX ADMIN — ROCURONIUM BROMIDE 20 MG: 10 INJECTION INTRAVENOUS at 09:49

## 2018-12-31 RX ADMIN — POTASSIUM PHOSPHATE, MONOBASIC AND POTASSIUM PHOSPHATE, DIBASIC 25 MMOL: 224; 236 INJECTION, SOLUTION INTRAVENOUS at 19:59

## 2018-12-31 ASSESSMENT — ACTIVITIES OF DAILY LIVING (ADL)
ADLS_ACUITY_SCORE: 10
ADLS_ACUITY_SCORE: 11

## 2018-12-31 ASSESSMENT — MIFFLIN-ST. JEOR: SCORE: 1497

## 2018-12-31 NOTE — PLAN OF CARE
CVP= 5-6, PA= 29-34/10-16, SVO2= 59-66. Denies chest pain or SOB. Normal sinus rhythm or sinus bradycardia with bundle branch block. MAP greater than 65. Pain to swan site, PRN Tylenol given x1. Potassium 3.2, replaced per Cards 2 resident. Will continue to monitor.

## 2018-12-31 NOTE — H&P
CV ICU PROGRESS NOTE  12/31/2018    CO-MORBIDITIES:   Chronic systolic heart failure (H)  (primary encounter diagnosis)  STEMI  ICD  DM    ASSESSMENT: Mariusz Sharp is a 58 year old male now s/p LVAD (HM 3) placement with Dr. Baker on 12/31/2018 for a history of ischemic cardiomyopathy with reduced ejection fraction. There were no major intraoperative events to report and the patient was able to be liberated from cardiopulmonary bypass without significant difficulty. There were no significant intraoperative concerns regarding bleeding.    TODAY'S PROGRESS:   - Arrived from OR at 1245 on vaso, epi for pressors.   - EBL 1L. +.  - Cell Saver: 600. Other products: 2 cryo, 2plt, 1 ffp.  - Postop SELVIN: global hypokinetic, rt heart function okay  - Volume responsive, baseline CVP 7-8, wedge 12 (pre)  - keep intubated overnight.  - back up rate of 80 VO    PLAN:  Neuro/pain/sedation:  - Monitor neurological status. Notify the MD for any acute changes in exam.  - Fentanyl gtt for pain.  - Propofol/Precedex gtt for sedation.  - Tylenol scheduled. Oxy/dil PRN. Robaxin PRN.   - Lidoderm    Pulmonary:   - Supplemental oxygen to keep saturation above 92 %.  - Incentive spirometer every 15- 30 minutes when awake.  - Mechanically ventilated, plan to observe overnight and plan to extubate tomorrow.     Cardiovascular:    - Monitor hemodynamic status.   - Epi/vaso gtts. Wean as able.  - MAP > 65.  - Holding PTA amiodarone and digoxin, metoplolol    GI care:   - Miralax/senna-colace, suppository PRN    Fluids/ Electrolytes/ Nutrition:   - TKO  - NPO  - holding on tube feeds  - BMP q6h.  - No indication for parenteral nutrition.    Renal:    - Urine output is adequate so far.  - Will continue to monitor intake and output.  - keep Perez  - Goal positive for the day.    Endocrine:    - Insulin gtt    ID/ Antibiotics:  - Periop antibiotics.    Heme:     - Hemoglobin stable.   - CBC/coags q6h.  - Anticoagulation:  holding.    Prophylaxis:    - Mechanical prophylaxis for DVT.   - No chemical DVT prophylaxis due to high risk of bleeding.   - PPI    Lines/tubes/drains:  - right IV CVC with [place], arterial line, pivs,  Perez, ett.   - Chest tubes: med x2, left pleural, pericardial tee    Disposition:  -  CV ICU    Patient seen, findings and plan discussed with CV ICU staff, Dr. Joaquin.    Gopal Grimes MD   [phone]    ====================================    SUBJECTIVE:   No acute issues in the OR or en route from OR to icu.     OBJECTIVE:   1. VITAL SIGNS:   Temp:  [96.4  F (35.8  C)-99.1  F (37.3  C)] 98.3  F (36.8  C)  Pulse:  [60] 60  Heart Rate:  [53-74] 54  Resp:  [14-18] 16  BP: ()/(63-81) 99/69  SpO2:  [91 %-99 %] 98 %  Resp: 16      2. INTAKE/ OUTPUT:   I/O last 3 completed shifts:  In: 804.41 [P.O.:675; I.V.:129.41]  Out: 1875 [Urine:1875]    3. PHYSICAL EXAMINATION:   General:   Neuro: sedated, pupils equal and round  Resp: Breathing non-labored against ventilator.   CV: RRR  Abdomen: Soft, Non-distended, Non-tender  Incisions: c/d/i  Extremities: warm and well perfused, trace edema.     4. INVESTIGATIONS:   Arterial Blood Gases   Recent Labs   Lab 12/31/18  1154 12/31/18  1121 12/31/18  1040 12/31/18  0954   PH 7.34* 7.28* 7.27* 7.30*   PCO2 43 43 49* 57*   PO2 236* 186* 229* 236*   HCO3 24 20* 22 28     Complete Blood Count   Recent Labs   Lab 12/31/18  1154 12/31/18  1121 12/31/18  1040 12/31/18  1030  12/31/18  0419 12/30/18 2007 12/30/18  0530   WBC  --   --   --  15.7*  --  7.2 7.0 5.6   HGB 8.5* 8.7* 8.9* 9.4*   < > 13.3 13.2* 13.0*   PLT  --   --   --  142*  --  218 221 211    < > = values in this interval not displayed.     Basic Metabolic Panel  Recent Labs   Lab 12/31/18  1154 12/31/18  1121 12/31/18  1040 12/31/18  0954  12/31/18  0419 12/30/18  2007 12/30/18  1720 12/30/18  0530   * 142 142 142   < > 140 137 138 139   POTASSIUM 3.4 3.9 3.1* 3.5   < > 3.2* 3.6 3.8 3.3*   CHLORIDE  --   --   --    --   --  108 102 104 105   CO2  --   --   --   --   --  25 28 27 23   BUN  --   --   --   --   --  36* 40* 39* 41*   CR  --   --   --   --   --  1.56* 1.67* 1.60* 1.66*   * 235* 277* 284*   < > 178* 280* 217* 168*    < > = values in this interval not displayed.     Liver Function Tests  Recent Labs   Lab 12/31/18  1030 12/30/18 2007 12/29/18  1043   AST  --  20 19   ALT  --  31 26   ALKPHOS  --  43 36*   BILITOTAL  --  0.7 1.0   ALBUMIN  --  3.6 3.6   INR 1.65* 1.04 1.08     Pancreatic Enzymes  No lab results found in last 7 days.  Coagulation Profile  Recent Labs   Lab 12/31/18  1030 12/30/18 2007 12/29/18  1043   INR 1.65* 1.04 1.08   PTT 34  --   --          5. RADIOLOGY:   Recent Results (from the past 24 hour(s))   XR Chest Port 1 View    Narrative    Exam: XR CHEST PORT 1 VW, 12/30/2018 4:54 PM    Indication: swan placement    Comparison: CT chest 7/22/2018, chest x-ray 7/18/2018    Findings:   Portable supine AP radiograph of the chest. Right IJ [place]vin  catheter with tip projecting over the right pulmonary artery. Left  chest wall cardiac device with leads in stable position. Portable  removal of the intra-aortic balloon pump. The cardiomediastinal  silhouette is enlarged. The pulmonary vasculature is within normal  limits. No pneumothorax. No significant pleural effusion. No focal  airspace opacities. No acute bony abnormalities. The visualized upper  abdomen appears unremarkable.      Impression    Impression:   1. Right IJ [place]-Calvin catheter with tip projecting over the right  pulmonary artery.  2. No focal airspace opacities.    I have personally reviewed the examination and initial interpretation  and I agree with the findings.    HEMANT OBANDO MD   XR Chest Port 1 View    Narrative    XR CHEST PORT 1 VW 12/30/2018 9:34 PM    History: pre op eval    Comparison: Earlier same day    Findings: Single AP view of the chest. Right-sided IJ [place]-Calvin  catheter tip projects over the right pulmonary  artery. Left-sided  cardiac pacemaker leads are in stable position. Mild cardiomegaly.  There is no focal pulmonary opacities. No pneumothorax or significant  pleural effusion. No focal bony normalities.      Impression    Impression:   1. Slight advancement of the right IJ [place]-Calvin catheter now  projecting less than centimeter beyond the mediastinal border.  2. No focal pulmonary opacities.    I have personally reviewed the examination and initial interpretation  and I agree with the findings.    HEMANT OBANDO MD       =========================================

## 2018-12-31 NOTE — PHARMACY-VANCOMYCIN DOSING SERVICE
Pharmacy Vancomycin Initial Note  Date of Service 2018  Patient's  1960  58 year old, male    Indication: Abscess and LVAD prophylaxis    Current estimated CrCl = Estimated Creatinine Clearance: 51.2 mL/min (A) (based on SCr of 1.5 mg/dL (H)).    Creatinine for last 3 days  2018: 10:43 AM Creatinine 1.55 mg/dL  2018:  5:30 AM Creatinine 1.66 mg/dL;  5:20 PM Creatinine 1.60 mg/dL;  8:07 PM Creatinine 1.67 mg/dL  2018:  4:19 AM Creatinine 1.56 mg/dL;  1:02 PM Creatinine 1.50 mg/dL    Recent Vancomycin Level(s) for last 3 days  No results found for requested labs within last 72 hours.      Vancomycin IV Administrations (past 72 hours)                   vancomycin (VANCOCIN) 1000 mg in dextrose 5% 200 mL PREMIX (mg) 1,000 mg Given 18 0740                Nephrotoxins and other renal medications (From now, onward)    Start     Dose/Rate Route Frequency Ordered Stop    18 1500  rifampin (RIFADIN) 600 mg in sodium chloride 0.9 % 100 mL intermittent infusion      600 mg Intravenous EVERY 24 HOURS 18 1301 19 1459    18 1315  vancomycin (VANCOCIN) 1000 mg in dextrose 5% 200 mL PREMIX      1,000 mg  200 mL/hr over 1 Hours Intravenous EVERY 12 HOURS 18 1301 19 1314    18 1315  vasopressin (VASOSTRICT) 40 Units in D5W 40 mL infusion      1-4 Units/hr  1-4 mL/hr  Intravenous CONTINUOUS 18 1301            Contrast Orders - past 72 hours (72h ago, onward)    Start     Dose/Rate Route Frequency Ordered Stop    18 0900  perflutren diluted 1mL to 2mL with saline (OPTISON) diluted injection 4 mL      4 mL Intravenous ONCE 18 0857 18 0900                Plan:  1.  Continue vancomycin  1000 mg (~13 mg/kg) IV q12h for 48 hours s/p LVAD placement.   2.  Goal Trough Level: 15-20 mg/L   3.  Pharmacy will check trough levels as appropriate (if renal function and urine output were to decline).  4. Serum creatinine levels will be  ordered daily for the first week of therapy and at least twice weekly for subsequent weeks.    5. [place] method utilized to dose vancomycin therapy: Method 2    Sasha Rodriguez, PharmD  PGY2 Infectious Diseases Pharmacy Resident  Pager: [phone]

## 2018-12-31 NOTE — PLAN OF CARE
CNS: Alert and oriented. Denies pain.   Resp: 2 L nc - oxygen sats >92%.   CV. SR with BBB. HR 60's. MAP>65.   GI: 2 g sodium diet. Tolerating well. NPO at midnight.  : Adequate urine output. Dr. Pascal notified of 1700 BMP - Dr. Pascal said not to replace potassium or magnesium at this time due to patient is stable.    Plan: LVAD tomorrow.

## 2018-12-31 NOTE — PLAN OF CARE
Pt arrived from OR arround 1300. PERRL 1mm. Sedation holiday completed. Moves all 4 extremities. Sedation turned back up till PS trial tomorrow AM. SR with rates 90s. No ectopy noted to time. BPs labile. See flowsheets for bolus and medication administration. Epi and Vaso infusing as ordered. See flowsheets for hemodynamics. LVAD flows 3s Speed 5100 PIs 2-3 Power 3s. While repositioning ETT patient began coughing. LVAD alarmed Low Flow. MAPs dropped to 40s. HR maintained in 90s. Writer started Levo and gave fluid bolus. Low flow alarm stopped. 750 total albumin given. 500 LR 2 amps bicarb given to time. :16:5:40%. Lungs coarse/diminished. See flowsheets for UOP and insulin titration.     Admitted/transferred from: OR  Reason for admission/transfer: Post op care  Patient status upon admission/transfer:   Interventions:   Plan:   2 RN skin assessment: completed by Dick PEREZ RN  Result of skin assessment and interventions/actions: Preventative mepilex to coccyx.

## 2018-12-31 NOTE — ANESTHESIA PREPROCEDURE EVALUATION
Anesthesia Pre-Procedure Evaluation    Patient: Mariusz Sharp   MRN:     [id number] Gender:   male   Age:    58 year old :      1960        Preoperative Diagnosis: Heart Failure   Procedure(s):  Median Sternotomy, On Cardiopulmonary Bypass Pump, Insertion of Left Ventricular Assist Device (Heartmate III)     Past Medical History:   Diagnosis Date     Acute kidney injury (H)      CKD (chronic kidney disease)      Coronary artery disease      Diabetes mellitus (H)      HTN (hypertension)      Hyperlipidemia      Ischemic cardiomyopathy      Paroxysmal atrial flutter (H)      Systolic heart failure (H)      Ventricular tachycardia (H)       Past Surgical History:   Procedure Laterality Date     COLONOSCOPY N/A 2018    Procedure: COLONOSCOPY;  Surgeon: Krish Mercado MD;  Location:  OR      PRQ TRLUML CORONARY ANGIOPLASTY ADDL BRANCH      PCI and ALYSSA to ostial LAD on 18     IMPLANT AUTOMATIC IMPLANTABLE CARDIOVERTER DEFIBRILLATOR                     PHYSICAL EXAM:   Mental Status/Neuro:    Airway: Facies: Feasible  Mallampati: II  Mouth/Opening: Full  TM distance: > 6 cm  Neck ROM: Full   Respiratory:    CV:    Comments:                    Lab Results   Component Value Date    WBC 15.7 (H) 2018    HGB 8.9 (L) 2018    HCT 29.9 (L) 2018     (L) 2018    CRP 11.0 (H) 2018     2018    POTASSIUM 3.1 (L) 2018    CHLORIDE 108 2018    CO2 25 2018    BUN 36 (H) 2018    CR 1.56 (H) 2018     (H) 2018    ARLENE 8.1 (L) 2018    PHOS 3.5 2018    MAG 1.9 2018    ALBUMIN 3.6 2018    PROTTOTAL 7.2 2018    ALT 31 2018    AST 20 2018    ALKPHOS 43 2018    BILITOTAL 0.7 2018    PTT 34 2018    INR 1.65 (H) 2018    FIBR 231 2018    TSH 6.91 (H) 2018    T4 1.05 2018       Preop Vitals  BP Readings from Last 3 Encounters:   18 99/69   18  "106/75   11/13/18 (!) 143/97    Pulse Readings from Last 3 Encounters:   12/30/18 60   11/13/18 61   10/17/18 57      Resp Readings from Last 3 Encounters:   12/31/18 16   12/07/18 18   11/13/18 18    SpO2 Readings from Last 3 Encounters:   12/31/18 98%   12/07/18 97%   11/13/18 97%      Temp Readings from Last 1 Encounters:   12/31/18 36.8  C (98.3  F) (Oral)    Ht Readings from Last 1 Encounters:   12/30/18 1.626 m (5' 4\")      Wt Readings from Last 1 Encounters:   12/31/18 75.6 kg (166 lb 10.7 oz)    Estimated body mass index is 28.61 kg/m  as calculated from the following:    Height as of this encounter: 1.626 m (5' 4\").    Weight as of this encounter: 75.6 kg (166 lb 10.7 oz).     LDA:  Peripheral IV 12/29/18 Right;Lateral Lower forearm (Active)   Site Assessment WDL 12/31/2018  4:00 AM   Line Status Saline locked 12/31/2018 12:00 AM   Phlebitis Scale 0-->no symptoms 12/31/2018  4:00 AM   Infiltration Scale 0 12/31/2018  4:00 AM   Infiltration Site Treatment Method  None 12/31/2018  4:00 AM   Extravasation? No 12/31/2018  4:00 AM   Number of days: 2       Pulmonary Artery Catheter - Single Lumen 12/30/18 1600 Right internal jugular vein continuous hemodynamic catheter (Active)   Dressing dressing dry and intact 12/31/2018  4:00 AM   Securement secured with sutures 12/31/2018  4:00 AM   Phlebitis 0-->no symptoms 12/31/2018  4:00 AM   Infiltration 0-->no symptoms 12/31/2018  4:00 AM   Waveform normal 12/31/2018  4:00 AM   Number of days: 1       ETT (adult) 8 (Active)   Number of days: 0       Chest Tube 1 Mediastinal 36 Colomb[place] (Active)   Number of days: 0       Chest Tube 2 Mediastinal 36 Colomb[place] (Active)   Number of days: 0       Chest Tube 3 Left Pleural 28 Colombian (Active)   Number of days: 0       Chest Tube 4 Other (Comment) 19 [place] (Active)   Number of days: 0       Urethral Catheter Double-lumen;Latex;Temperature probe 16 fr (Active)   Number of days: 0            Assessment:   ASA SCORE: 4    NPO Status: " > 6 hours since completed Solid Foods   Documentation: H&P complete; Preop Testing complete; Consents complete   Proceeding: Proceed without further delay  Tobacco Use:  Active user of Tobacco     Plan:   Anes. Type:  General   Pre-Induction: Midazolam IV; Acetaminophen PO   Induction:  IV (Standard)   Airway: Oral ETT   Access/Monitoring: PIV; A-Line; FloTrac     Advanced Access: CVL by anesthesia; PAC by anesthesia; CPB     Advanced Monitoring: NIRS (cerebral); SELVIN Adult; TEG            ADULT SELVIN Checklist:               Absolute Contra-Indications: NONE               Relative Contra-Indications:  NONE               Final Plan: Proceed with SELVIN!   Maintenance: Balanced   Emergence: Procedure Site   Logistics: Same Day Surgery     Postop Pain/Sedation Strategy:  Standard-Options: Opioids PRN     PONV Management:  Adult Risk Factors:, Postop Opioids     CONSENT: Direct conversation   Plan and risks discussed with: Patient   Blood Products: Consented (ALL Blood Products)                         Liliam Alegria MD

## 2018-12-31 NOTE — PROGRESS NOTES
CLINICAL NUTRITION SERVICES - BRIEF NOTE    Received provider consult for nutrition education with comments post op cardiovascular surgery (automatic consult on post-op order set). S/p Median Sternotomy, On Cardiopulmonary Bypass Pump, Insertion of Left Ventricular Assist Device (Heartmate III) on 12/31. Nutrition education to be completed as able/appropriate (as pt s/p CABG and/or initial VAD).    RD will follow per LOS protocol or if re-consulted.     Kristine Lorenzo RD, LD   CVICU RD Pager: [phone]

## 2018-12-31 NOTE — ANESTHESIA PROCEDURE NOTES
Perioperative SELVIN Report  Anesthesia Information  SELVIN probe placed and report generated by:   Isaac Summers MD in the presence of a teaching physician :  Liliam Alegria MD    I personally reviewed the images and the fellow/resident interpretation.  I agree with the fellow/resident interpretation as amended by myself. Liliam Alegria MD  Images for this study have been archived.     Echocardiogram Comments: Global hypokinesis, EF~30%, No PFO, no aortic insufficiency, trace MR/TR, no mitral or aortic stenosis. E/A- 1.44, E/e'-56.4/9.96=5.7,  No left ventricular thrombus identified.    Aortic annulus - 2.15cm, LVOT - 2.47cm, Aortic sinus - 3.67cm, STJ - 3.45cm    Preanesthesia Checklist:  Patient identified, IV assessed, risks and benefits discussed, monitors and equipment assessed, procedure being performed at surgeon's request and anesthesia consent obtained.  General Procedure Information  Modalities:  2D, 3D, CW Doppler, PW Doppler and Color flow mapping    Heart Mate 3 LVAD placement  Echocardiographic and Doppler Measurements  Right Ventricle:  Cavity size dilated.     Left Ventricle:  Cavity size dilated.       Ventricular Regional Function:  1- Basal Anteroseptal:  hypokinetic  2- Basal Anterior:  hypokinetic  3- Basal Anterolateral:  hypokinetic  4- Basal Inferolateral:  hypokinetic  5- Basal Inferior:  hypokinetic  6- Basal Inferoseptal:  hypokinetic  7- Mid Anteroseptal:  hypokinetic  8- Mid Anterior:  hypokinetic  9- Mid Anterolateral:  hypokinetic  10- Mid Inferolateral:  hypokinetic  11- Mid Inferior:  hypokinetic  12- Mid Inferoseptal:  hypokinetic  13- Apical Anterior:  hypokinetic  14- Apical Lateral:  hypokinetic  15- Apical Inferior:  hypokinetic  16- Apical Septal:  hypokinetic  17- [place]:  hypokinetic    Valves  Aortic Valve: Annulus normal.  Stenosis not present.  Regurgitation absent.  Leaflets normal.  Leaflet motions normal.    Mitral Valve: Annulus normal.  Stenosis not present.   Regurgitation +1.  Leaflets normal.    Tricuspid Valve: Annulus normal.  Stenosis not present.  Regurgitation +1.  Leaflets normal.  Leaflet motions normal.    Pulmonic Valve: Annulus normal.  Stenosis not present.  Regurgitation absent.      Aorta: Ascending Aorta: Size normal.  Dissection not present.  Plaque thickness less than 3 mm.  Mobile plaque not present.    Aortic Arch: Size normal.   Dissection not present.   Plaque thickness less than 3 mm.   Mobile plaque not present.    Descending Aorta: Size normal.   Dissection not present.   Plaque thickness less than 3 mm.   Mobile plaque not present.        Right Atrium:  Size normal.   Spontaneous echo contrast not present.   Thrombus not present.   Tumor not present.   Device not present.     Left Atrium: Size dilated.  Spontaneous echo contrast not present.  Thrombus not present.  Tumor not present.  Device not present.    Left atrial appendage normal.     Atrial Septum: Intra-atrial septal morphology normal.     Ventricular Septum: Intra-ventricular septum morphology normal.     Diastolic Function Measurements:  Diastolic Dysfunction Grade=. E= 56.4 cm/s ms. A= 39.1 cm/s ms.  E/A Ratio=  1.44.  DT=  ms.  S/D= .  IVRT= ms.  Other Findings:   Pericardium:  normal.  . .  Pulmonary Venous Flow:  blunted (decreased) systolic flow.  No coronary sinus catheter present.  .  .  Post Intervention Findings  Procedure(s) performed:  LVAD Placement. Global function:  Unchanged.   Regional wall motion:  Unchanged   .  .  .   .  .  .  .  .  .  LVAD Placement:  LV decompressed.  PFO not present.  Aortic valve opening.    Inflow cannula velocity 74.8 cm/s systolic, 61.8 cm/s diastolic RPM 5000, 3.9L/min  Outflow graft velocity 84.4 cm/s systolic, 45.0cm/s diastolic RPM 5000. Right fractional area change 30%.      Echocardiogram Comments  Global hypokinesis, EF~30%, No PFO, no aortic insufficiency, trace MR/TR, no mitral or aortic stenosis. E/A- 1.44, E/e'-56.4/9.96=5.7,  No  left ventricular thrombus identified.    Aortic annulus - 2.15cm, LVOT - 2.47cm, Aortic sinus - 3.67cm, STJ - 3.45cm

## 2018-12-31 NOTE — ANESTHESIA PROCEDURE NOTES
Arterial Line Procedure Note  Staff:     Anesthesiologist:  Liliam Alegria MD  Location: In OR After Induction  Procedure Start/Stop Times:     patient identified, IV checked, site marked, risks and benefits discussed, informed consent, monitors and equipment checked, pre-op evaluation and at physician/surgeon's request      Correct Patient: Yes      Correct Position: Yes      Correct Site: Yes      Correct Procedure: Yes      Correct Laterality:  Yes    Site Marked:  Yes  Line Placement:     Procedure:  Arterial Line    Insertion Site:  Radial    Insertion laterality:  Left    Skin Prep: Chloraprep      Patient Prep: patient draped, mask, sterile gloves, hat and hand hygiene      Local skin infiltration:  1% lidocaine    Ultrasound Guided?: Yes      Artery evaluated via ultrasound confirming patency.   Using realtime imaging, the artery was punctured and the needle was observed entering the artery.      A permanent image is entered into patient's chart.      Catheter size:  20 gauge, Quick cath    Cath secured with: anchor securement device      Dressing:  Tegaderm and Occlusive Gauze    Complications:  None obvious    Arterial waveform: Yes

## 2018-12-31 NOTE — PROGRESS NOTES
Transplant Social Work Services Progress Note      Date of Initial Social Work Evaluation: 7/17/18  Collaborated with: chart review    Data: SW received order to see Red for HCD. He is currently in the OR receiving LVAD.   Intervention: Per chart review Red has not completed HCD. Therefore according to Next of Kin policy his sons would be his decision makers.   Met with brother & dad in OR waiting room. Discussed lodging options and HCD.   Assessment:  Romeo reports that he and his dad have reservations at the [facility] for tonight and tomorrow night. They are then returning home to [place]. At this time he anticipates being the primary caregiver for Red; although there may be a couple of friends stepping in as well. He isn't sure about the apartment at this time as he's not sure when he'll be back. He would like CM to assist in County assistance to pay for lodging. Romeo also reports that he doesn't think that Red completed a HCD. He and Red did discuss it; but Romeo would prefer that sons make the decisions.   Education provided by CM: lodging options & HCD  Plan:    Discharge Plans in Progress: pending medical progress    Barriers to d/c plan: pending medical progress    Follow up Plan: CM will continue to be available as needed.     Pager [phone]

## 2018-12-31 NOTE — ANESTHESIA PROCEDURE NOTES
SELVIN Probe Insertion Note:    Inserted by:  Liliam Alegria MD (Responsible Anesthesiologist)                        Isaac Summers MD (in the presence of a teaching physician )  Probe Number: [id number]  Probe Status PRE Insertion: NO obvious damage  Probe type:  Adult 3D    Bite block used:   Yes  Insertion Technique: Easy, no oropharyngeal manipulation  Insertion complications: None obvious    Billing Report:SELVIN report by Anesthesiologist (See Separate Report note)    Probe Status POST Removal: NO obvious damage

## 2018-12-31 NOTE — ANESTHESIA POSTPROCEDURE EVALUATION
Anesthesia POST Procedure Evaluation    Patient: Mariusz Sharp   MRN:     [id number] Gender:   male   Age:    58 year old :      1960        Preoperative Diagnosis: Heart Failure   Procedure(s):  Median Sternotomy, On Cardiopulmonary Bypass Pump, Insertion of Left Ventricular Assist Device (Heartmate III)   Postop Comments: No value filed.       Anesthesia Type:  General    Reportable Event: NO     PAIN:        Neuro/Psych:   Sign Out Status: Planned Postop Sedation     Airway/Resp.:   Sign Out Status: Airway Device resent     Airway Device: ETT                 Reason: Planned (pre-op)     CV:   Sign Out status: CV Support within EXPECTED Parameters     Disposition:   Sign Out in:  ICU  Disposition:  ICU  Recovery Course: Recovery in ICU           Last Anesthesia Record Vitals:  CRNA VITALS  2018 1208 - 2018 1308      2018             ART BP:  78/62  (Abnormal)     Ht Rate:  89          Last PACU/Preop Vitals:  Vitals:    18 1300 18 1315 18 1330   BP:      Pulse:      Resp: 14     Temp:      SpO2: 99% 100% 100%         Electronically Signed By: Liliam Alegria MD, 2018, 2:26 PM

## 2018-12-31 NOTE — PROCEDURES
"Procedure/Surgery Information   [facility], [place]     Procedure Note  Date of Service (when I performed the procedure): 12/30/2018    Procedure: pulmonary artery catheter  Indication: preoperative hemodynamic monitor prior to left ventricular assist device    Central line  Date/Time: 12/30/2018 6:56 PM  Performed by: Miriam Pascal MD  Authorized by: Miriam Pascal MD   Consent: Verbal consent obtained. Written consent obtained.  Risks and benefits: risks, benefits and alternatives were discussed  Consent given by: patient  Patient understanding: patient states understanding of the procedure being performed  Patient consent: the patient's understanding of the procedure matches consent given  Procedure consent: procedure consent matches procedure scheduled  Relevant documents: relevant documents present and verified  Test results: test results available and properly labeled  Site marked: the operative site was marked  Imaging studies: imaging studies available  Required items: required blood products, implants, devices, and special equipment available  Patient identity confirmed: verbally with patient  Time out: Immediately prior to procedure a \"time out\" was called to verify the correct patient, procedure, equipment, support staff and site/side marked as required.  Indications: vascular access and central pressure monitoring  Anesthesia: local infiltration    Anesthesia:  Local Anesthetic: lidocaine 1% without epinephrine  Anesthetic total: 6 mL    Sedation:  Patient sedated: no    Preparation: skin prepped with chlorhexidine  Location details: right internal jugular  Patient position: Trendelenburg  Catheter type: double lumen  Pre-procedure: landmarks identified  Ultrasound guidance: yes  Number of attempts: 1  Successful placement: yes  Post-procedure: line sutured and dressing applied  Assessment: blood return through all parts and placement verified by x-ray  Patient " tolerance: Patient tolerated the procedure well with no immediate complications  Comments: Pulmonary artery catheter inserted via dual lumen mac sheath.  Post procedure CXR ordered.      Dr. Nielson available for critical portions of procedure.    Miriam Pascal MD PGY5  Cardiology Fellow

## 2018-12-31 NOTE — ANESTHESIA CARE TRANSFER NOTE
Patient: Mariusz Sharp    Procedure(s):  Median Sternotomy, On Cardiopulmonary Bypass Pump, Insertion of Left Ventricular Assist Device (Heartmate III)    Diagnosis: Heart Failure  Diagnosis Additional Information: No value filed.    Anesthesia Type:   No value filed.     Note:  Airway :ETT and Ventilator  Patient transferred to:ICU  Comments: Pt transported on monitors with ventilation via ambu bag.  Full report given to ICU RNICU Handoff: Call for PAUSE to initiate/utilize ICU HANDOFF, Identified Patient, Identified Responsible Provider, Reviewed the Pertinent Medical History, Discussed Surgical Course, Reviewed Intra-OP Anesthesia Management and Issues during Anesthesia, Set Expectations for Post Procedure Period and Allowed Opportunity for Questions and Acknowledgement of Understanding      Vitals: (Last set prior to Anesthesia Care Transfer)    CRNA VITALS  12/31/2018 1208 - 12/31/2018 1248      12/31/2018             Resp Rate (observed):  3  (Abnormal)     Resp Rate (set):  10                Electronically Signed By: CARMEN Collins CRNA  December 31, 2018  12:48 PM

## 2018-12-31 NOTE — BRIEF OP NOTE
[facility], [place]    Brief Operative Note    Pre-operative diagnosis: Heart Failure  Post-operative diagnosis * No post-op diagnosis entered *  Procedure: Procedure(s):  Median Sternotomy, On Cardiopulmonary Bypass Pump, Insertion of Left Ventricular Assist Device (Heartmate III)  Surgeon: Surgeon(s) and Role:     * Kamaljit Baker MD - Primary     * Christine Cooper PA-C - Assisting     * Galen Lane MD - Fellow - Assisting  Anesthesia: General   Estimated blood loss: 1000 mL  Drains:  36 Fr med x 2, 28 Fr Left pleural, 19 Fr pericardial tee  Specimens:   ID Type Source Tests Collected by Time Destination   A : APEX OF LEFT VENTRICLE Tissue Other SURGICAL PATHOLOGY EXAM Kamaljit Baker MD 12/31/2018  9:10 AM      Findings: Insertion Heartmate III LVAD on CPB.  Complications: None.  Implants: HM3 LVAD.

## 2019-01-01 ENCOUNTER — APPOINTMENT (OUTPATIENT)
Dept: GENERAL RADIOLOGY | Facility: CLINIC | Age: 59
End: 2019-01-01
Attending: THORACIC SURGERY (CARDIOTHORACIC VASCULAR SURGERY)
Payer: MEDICAID

## 2019-01-01 ENCOUNTER — APPOINTMENT (OUTPATIENT)
Dept: CARDIOLOGY | Facility: CLINIC | Age: 59
End: 2019-01-01
Attending: INTERNAL MEDICINE
Payer: MEDICAID

## 2019-01-01 ENCOUNTER — APPOINTMENT (OUTPATIENT)
Dept: GENERAL RADIOLOGY | Facility: CLINIC | Age: 59
End: 2019-01-01
Attending: INTERNAL MEDICINE
Payer: MEDICAID

## 2019-01-01 LAB
ALBUMIN SERPL-MCNC: 2.8 G/DL (ref 3.4–5)
ALBUMIN SERPL-MCNC: 2.9 G/DL (ref 3.4–5)
ALBUMIN SERPL-MCNC: 3 G/DL (ref 3.4–5)
ALBUMIN SERPL-MCNC: 3.1 G/DL (ref 3.4–5)
ALP SERPL-CCNC: 26 U/L (ref 40–150)
ALP SERPL-CCNC: 30 U/L (ref 40–150)
ALP SERPL-CCNC: 31 U/L (ref 40–150)
ALP SERPL-CCNC: 31 U/L (ref 40–150)
ALT SERPL W P-5'-P-CCNC: 17 U/L (ref 0–70)
ALT SERPL W P-5'-P-CCNC: 19 U/L (ref 0–70)
ANION GAP SERPL CALCULATED.3IONS-SCNC: 5 MMOL/L (ref 3–14)
ANION GAP SERPL CALCULATED.3IONS-SCNC: 7 MMOL/L (ref 3–14)
ANION GAP SERPL CALCULATED.3IONS-SCNC: 8 MMOL/L (ref 3–14)
ANION GAP SERPL CALCULATED.3IONS-SCNC: 9 MMOL/L (ref 3–14)
AST SERPL W P-5'-P-CCNC: 24 U/L (ref 0–45)
AST SERPL W P-5'-P-CCNC: 26 U/L (ref 0–45)
AST SERPL W P-5'-P-CCNC: 30 U/L (ref 0–45)
AST SERPL W P-5'-P-CCNC: 35 U/L (ref 0–45)
BASE DEFICIT BLDA-SCNC: 0.3 MMOL/L
BASE DEFICIT BLDA-SCNC: 0.4 MMOL/L
BASE DEFICIT BLDA-SCNC: 1 MMOL/L
BASE DEFICIT BLDA-SCNC: 1.9 MMOL/L
BASE DEFICIT BLDV-SCNC: 6.4 MMOL/L
BASE EXCESS BLDA CALC-SCNC: 0.9 MMOL/L
BASE EXCESS BLDA CALC-SCNC: 1.4 MMOL/L
BASE EXCESS BLDA CALC-SCNC: 1.4 MMOL/L
BASE EXCESS BLDA CALC-SCNC: 2.2 MMOL/L
BASE EXCESS BLDA CALC-SCNC: 5.2 MMOL/L
BASE EXCESS BLDV CALC-SCNC: 1.4 MMOL/L
BASE EXCESS BLDV CALC-SCNC: 3.6 MMOL/L
BASE EXCESS BLDV CALC-SCNC: 4.4 MMOL/L
BILIRUB SERPL-MCNC: 1.8 MG/DL (ref 0.2–1.3)
BILIRUB SERPL-MCNC: 1.8 MG/DL (ref 0.2–1.3)
BILIRUB SERPL-MCNC: 2.1 MG/DL (ref 0.2–1.3)
BILIRUB SERPL-MCNC: 2.2 MG/DL (ref 0.2–1.3)
BLD PROD TYP BPU: NORMAL
BLD UNIT ID BPU: 0
BLOOD PRODUCT CODE: NORMAL
BPU ID: NORMAL
BUN SERPL-MCNC: 18 MG/DL (ref 7–30)
BUN SERPL-MCNC: 19 MG/DL (ref 7–30)
BUN SERPL-MCNC: 21 MG/DL (ref 7–30)
BUN SERPL-MCNC: 22 MG/DL (ref 7–30)
CA-I BLD-MCNC: 4.7 MG/DL (ref 4.4–5.2)
CA-I BLD-MCNC: 4.9 MG/DL (ref 4.4–5.2)
CA-I BLD-MCNC: 4.9 MG/DL (ref 4.4–5.2)
CALCIUM SERPL-MCNC: 7.2 MG/DL (ref 8.5–10.1)
CALCIUM SERPL-MCNC: 7.2 MG/DL (ref 8.5–10.1)
CALCIUM SERPL-MCNC: 8.3 MG/DL (ref 8.5–10.1)
CALCIUM SERPL-MCNC: 8.6 MG/DL (ref 8.5–10.1)
CHLORIDE SERPL-SCNC: 111 MMOL/L (ref 94–109)
CHLORIDE SERPL-SCNC: 114 MMOL/L (ref 94–109)
CO2 SERPL-SCNC: 24 MMOL/L (ref 20–32)
CO2 SERPL-SCNC: 25 MMOL/L (ref 20–32)
CO2 SERPL-SCNC: 26 MMOL/L (ref 20–32)
CO2 SERPL-SCNC: 26 MMOL/L (ref 20–32)
CREAT SERPL-MCNC: 1.34 MG/DL (ref 0.66–1.25)
CREAT SERPL-MCNC: 1.42 MG/DL (ref 0.66–1.25)
CREAT SERPL-MCNC: 1.48 MG/DL (ref 0.66–1.25)
CREAT SERPL-MCNC: 1.56 MG/DL (ref 0.66–1.25)
ERYTHROCYTE [DISTWIDTH] IN BLOOD BY AUTOMATED COUNT: 15.1 % (ref 10–15)
ERYTHROCYTE [DISTWIDTH] IN BLOOD BY AUTOMATED COUNT: 15.6 % (ref 10–15)
ERYTHROCYTE [DISTWIDTH] IN BLOOD BY AUTOMATED COUNT: 16.1 % (ref 10–15)
ERYTHROCYTE [DISTWIDTH] IN BLOOD BY AUTOMATED COUNT: 16.6 % (ref 10–15)
FIBRINOGEN PPP-MCNC: 385 MG/DL (ref 200–420)
GFR SERPL CREATININE-BSD FRML MDRD: 49 ML/MIN/{1.73_M2}
GFR SERPL CREATININE-BSD FRML MDRD: 52 ML/MIN/{1.73_M2}
GFR SERPL CREATININE-BSD FRML MDRD: 55 ML/MIN/{1.73_M2}
GFR SERPL CREATININE-BSD FRML MDRD: 59 ML/MIN/{1.73_M2}
GLUCOSE BLDC GLUCOMTR-MCNC: 100 MG/DL (ref 70–99)
GLUCOSE BLDC GLUCOMTR-MCNC: 113 MG/DL (ref 70–99)
GLUCOSE BLDC GLUCOMTR-MCNC: 124 MG/DL (ref 70–99)
GLUCOSE BLDC GLUCOMTR-MCNC: 128 MG/DL (ref 70–99)
GLUCOSE BLDC GLUCOMTR-MCNC: 136 MG/DL (ref 70–99)
GLUCOSE BLDC GLUCOMTR-MCNC: 136 MG/DL (ref 70–99)
GLUCOSE BLDC GLUCOMTR-MCNC: 143 MG/DL (ref 70–99)
GLUCOSE BLDC GLUCOMTR-MCNC: 152 MG/DL (ref 70–99)
GLUCOSE BLDC GLUCOMTR-MCNC: 154 MG/DL (ref 70–99)
GLUCOSE BLDC GLUCOMTR-MCNC: 158 MG/DL (ref 70–99)
GLUCOSE BLDC GLUCOMTR-MCNC: 167 MG/DL (ref 70–99)
GLUCOSE BLDC GLUCOMTR-MCNC: 168 MG/DL (ref 70–99)
GLUCOSE BLDC GLUCOMTR-MCNC: 181 MG/DL (ref 70–99)
GLUCOSE BLDC GLUCOMTR-MCNC: 184 MG/DL (ref 70–99)
GLUCOSE BLDC GLUCOMTR-MCNC: 190 MG/DL (ref 70–99)
GLUCOSE BLDC GLUCOMTR-MCNC: 67 MG/DL (ref 70–99)
GLUCOSE BLDC GLUCOMTR-MCNC: 80 MG/DL (ref 70–99)
GLUCOSE BLDC GLUCOMTR-MCNC: 91 MG/DL (ref 70–99)
GLUCOSE SERPL-MCNC: 122 MG/DL (ref 70–99)
GLUCOSE SERPL-MCNC: 159 MG/DL (ref 70–99)
GLUCOSE SERPL-MCNC: 192 MG/DL (ref 70–99)
GLUCOSE SERPL-MCNC: 80 MG/DL (ref 70–99)
HCO3 BLD-SCNC: 22 MMOL/L (ref 21–28)
HCO3 BLD-SCNC: 23 MMOL/L (ref 21–28)
HCO3 BLD-SCNC: 24 MMOL/L (ref 21–28)
HCO3 BLD-SCNC: 24 MMOL/L (ref 21–28)
HCO3 BLD-SCNC: 25 MMOL/L (ref 21–28)
HCO3 BLD-SCNC: 26 MMOL/L (ref 21–28)
HCO3 BLD-SCNC: 26 MMOL/L (ref 21–28)
HCO3 BLD-SCNC: 27 MMOL/L (ref 21–28)
HCO3 BLD-SCNC: 28 MMOL/L (ref 21–28)
HCO3 BLDV-SCNC: 18 MMOL/L (ref 21–28)
HCO3 BLDV-SCNC: 27 MMOL/L (ref 21–28)
HCO3 BLDV-SCNC: 28 MMOL/L (ref 21–28)
HCO3 BLDV-SCNC: 28 MMOL/L (ref 21–28)
HCT VFR BLD AUTO: 22.2 % (ref 40–53)
HCT VFR BLD AUTO: 24.8 % (ref 40–53)
HCT VFR BLD AUTO: 25.3 % (ref 40–53)
HCT VFR BLD AUTO: 25.7 % (ref 40–53)
HGB BLD-MCNC: 7.2 G/DL (ref 13.3–17.7)
HGB BLD-MCNC: 8.1 G/DL (ref 13.3–17.7)
HGB BLD-MCNC: 8.2 G/DL (ref 13.3–17.7)
HGB BLD-MCNC: 8.2 G/DL (ref 13.3–17.7)
INR PPP: 1.39 (ref 0.86–1.14)
INR PPP: 1.46 (ref 0.86–1.14)
LACTATE BLD-SCNC: 1 MMOL/L (ref 0.7–2)
LACTATE BLD-SCNC: 1.2 MMOL/L (ref 0.7–2)
LACTATE BLD-SCNC: 1.4 MMOL/L (ref 0.7–2)
LACTATE BLD-SCNC: 2.7 MMOL/L (ref 0.7–2)
MAGNESIUM SERPL-MCNC: 1.9 MG/DL (ref 1.6–2.3)
MAGNESIUM SERPL-MCNC: 2.1 MG/DL (ref 1.6–2.3)
MAGNESIUM SERPL-MCNC: 2.3 MG/DL (ref 1.6–2.3)
MAGNESIUM SERPL-MCNC: 2.4 MG/DL (ref 1.6–2.3)
MCH RBC QN AUTO: 27.8 PG (ref 26.5–33)
MCH RBC QN AUTO: 27.9 PG (ref 26.5–33)
MCH RBC QN AUTO: 28 PG (ref 26.5–33)
MCH RBC QN AUTO: 28.4 PG (ref 26.5–33)
MCHC RBC AUTO-ENTMCNC: 31.9 G/DL (ref 31.5–36.5)
MCHC RBC AUTO-ENTMCNC: 32 G/DL (ref 31.5–36.5)
MCHC RBC AUTO-ENTMCNC: 32.4 G/DL (ref 31.5–36.5)
MCHC RBC AUTO-ENTMCNC: 33.1 G/DL (ref 31.5–36.5)
MCV RBC AUTO: 86 FL (ref 78–100)
MCV RBC AUTO: 86 FL (ref 78–100)
MCV RBC AUTO: 87 FL (ref 78–100)
MCV RBC AUTO: 88 FL (ref 78–100)
O2/TOTAL GAS SETTING VFR VENT: 30 %
O2/TOTAL GAS SETTING VFR VENT: 35 %
OXYHGB MFR BLD: 93 % (ref 92–100)
OXYHGB MFR BLD: 95 % (ref 92–100)
OXYHGB MFR BLD: 96 % (ref 92–100)
OXYHGB MFR BLD: 97 % (ref 92–100)
OXYHGB MFR BLDV: 45 %
OXYHGB MFR BLDV: 56 %
OXYHGB MFR BLDV: 57 %
OXYHGB MFR BLDV: 57 %
PCO2 BLD: 29 MM HG (ref 35–45)
PCO2 BLD: 30 MM HG (ref 35–45)
PCO2 BLD: 32 MM HG (ref 35–45)
PCO2 BLD: 33 MM HG (ref 35–45)
PCO2 BLD: 37 MM HG (ref 35–45)
PCO2 BLD: 39 MM HG (ref 35–45)
PCO2 BLD: 40 MM HG (ref 35–45)
PCO2 BLD: 44 MM HG (ref 35–45)
PCO2 BLD: 44 MM HG (ref 35–45)
PCO2 BLDV: 31 MM HG (ref 40–50)
PCO2 BLDV: 37 MM HG (ref 40–50)
PCO2 BLDV: 39 MM HG (ref 40–50)
PCO2 BLDV: 48 MM HG (ref 40–50)
PH BLD: 7.37 PH (ref 7.35–7.45)
PH BLD: 7.39 PH (ref 7.35–7.45)
PH BLD: 7.39 PH (ref 7.35–7.45)
PH BLD: 7.41 PH (ref 7.35–7.45)
PH BLD: 7.42 PH (ref 7.35–7.45)
PH BLD: 7.5 PH (ref 7.35–7.45)
PH BLD: 7.5 PH (ref 7.35–7.45)
PH BLD: 7.52 PH (ref 7.35–7.45)
PH BLD: 7.56 PH (ref 7.35–7.45)
PH BLDV: 7.36 PH (ref 7.32–7.43)
PH BLDV: 7.38 PH (ref 7.32–7.43)
PH BLDV: 7.46 PH (ref 7.32–7.43)
PH BLDV: 7.49 PH (ref 7.32–7.43)
PHOSPHATE SERPL-MCNC: 4.1 MG/DL (ref 2.5–4.5)
PHOSPHATE SERPL-MCNC: 4.8 MG/DL (ref 2.5–4.5)
PHOSPHATE SERPL-MCNC: 4.9 MG/DL (ref 2.5–4.5)
PHOSPHATE SERPL-MCNC: 5.3 MG/DL (ref 2.5–4.5)
PLATELET # BLD AUTO: 102 10E9/L (ref 150–450)
PLATELET # BLD AUTO: 112 10E9/L (ref 150–450)
PLATELET # BLD AUTO: 125 10E9/L (ref 150–450)
PLATELET # BLD AUTO: 98 10E9/L (ref 150–450)
PO2 BLD: 100 MM HG (ref 80–105)
PO2 BLD: 102 MM HG (ref 80–105)
PO2 BLD: 118 MM HG (ref 80–105)
PO2 BLD: 124 MM HG (ref 80–105)
PO2 BLD: 129 MM HG (ref 80–105)
PO2 BLD: 129 MM HG (ref 80–105)
PO2 BLD: 137 MM HG (ref 80–105)
PO2 BLD: 150 MM HG (ref 80–105)
PO2 BLD: 87 MM HG (ref 80–105)
PO2 BLDV: 25 MM HG (ref 25–47)
PO2 BLDV: 28 MM HG (ref 25–47)
PO2 BLDV: 31 MM HG (ref 25–47)
PO2 BLDV: 33 MM HG (ref 25–47)
POTASSIUM SERPL-SCNC: 3.6 MMOL/L (ref 3.4–5.3)
POTASSIUM SERPL-SCNC: 3.6 MMOL/L (ref 3.4–5.3)
POTASSIUM SERPL-SCNC: 5 MMOL/L (ref 3.4–5.3)
POTASSIUM SERPL-SCNC: 5.2 MMOL/L (ref 3.4–5.3)
PROT SERPL-MCNC: 4.8 G/DL (ref 6.8–8.8)
PROT SERPL-MCNC: 5 G/DL (ref 6.8–8.8)
PROT SERPL-MCNC: 5 G/DL (ref 6.8–8.8)
PROT SERPL-MCNC: 5.3 G/DL (ref 6.8–8.8)
RBC # BLD AUTO: 2.58 10E12/L (ref 4.4–5.9)
RBC # BLD AUTO: 2.89 10E12/L (ref 4.4–5.9)
RBC # BLD AUTO: 2.89 10E12/L (ref 4.4–5.9)
RBC # BLD AUTO: 2.95 10E12/L (ref 4.4–5.9)
SODIUM SERPL-SCNC: 144 MMOL/L (ref 133–144)
SODIUM SERPL-SCNC: 146 MMOL/L (ref 133–144)
SODIUM SERPL-SCNC: 146 MMOL/L (ref 133–144)
SODIUM SERPL-SCNC: 147 MMOL/L (ref 133–144)
TRANSFUSION STATUS PATIENT QL: NORMAL
WBC # BLD AUTO: 5.3 10E9/L (ref 4–11)
WBC # BLD AUTO: 6.7 10E9/L (ref 4–11)
WBC # BLD AUTO: 7.3 10E9/L (ref 4–11)
WBC # BLD AUTO: 7.5 10E9/L (ref 4–11)

## 2019-01-01 PROCEDURE — 82805 BLOOD GASES W/O2 SATURATION: CPT | Performed by: SURGERY

## 2019-01-01 PROCEDURE — 40000196 ZZH STATISTIC RAPCV CVP MONITORING

## 2019-01-01 PROCEDURE — 71045 X-RAY EXAM CHEST 1 VIEW: CPT

## 2019-01-01 PROCEDURE — 84100 ASSAY OF PHOSPHORUS: CPT | Performed by: SURGERY

## 2019-01-01 PROCEDURE — 83605 ASSAY OF LACTIC ACID: CPT | Performed by: SURGERY

## 2019-01-01 PROCEDURE — 71045 X-RAY EXAM CHEST 1 VIEW: CPT | Mod: 77

## 2019-01-01 PROCEDURE — 25000128 H RX IP 250 OP 636

## 2019-01-01 PROCEDURE — 93321 DOPPLER ECHO F-UP/LMTD STD: CPT | Mod: 26 | Performed by: INTERNAL MEDICINE

## 2019-01-01 PROCEDURE — 25000128 H RX IP 250 OP 636: Performed by: STUDENT IN AN ORGANIZED HEALTH CARE EDUCATION/TRAINING PROGRAM

## 2019-01-01 PROCEDURE — 99291 CRITICAL CARE FIRST HOUR: CPT | Mod: 25 | Performed by: INTERNAL MEDICINE

## 2019-01-01 PROCEDURE — 93308 TTE F-UP OR LMTD: CPT | Mod: 26 | Performed by: INTERNAL MEDICINE

## 2019-01-01 PROCEDURE — 25000132 ZZH RX MED GY IP 250 OP 250 PS 637: Performed by: SURGERY

## 2019-01-01 PROCEDURE — 82330 ASSAY OF CALCIUM: CPT | Performed by: SURGERY

## 2019-01-01 PROCEDURE — 85384 FIBRINOGEN ACTIVITY: CPT | Performed by: SURGERY

## 2019-01-01 PROCEDURE — 83735 ASSAY OF MAGNESIUM: CPT | Performed by: THORACIC SURGERY (CARDIOTHORACIC VASCULAR SURGERY)

## 2019-01-01 PROCEDURE — 25800025 ZZH RX 258: Performed by: THORACIC SURGERY (CARDIOTHORACIC VASCULAR SURGERY)

## 2019-01-01 PROCEDURE — 40000275 ZZH STATISTIC RCP TIME EA 10 MIN

## 2019-01-01 PROCEDURE — 25000125 ZZHC RX 250: Performed by: SURGERY

## 2019-01-01 PROCEDURE — P9041 ALBUMIN (HUMAN),5%, 50ML: HCPCS

## 2019-01-01 PROCEDURE — 83605 ASSAY OF LACTIC ACID: CPT | Performed by: THORACIC SURGERY (CARDIOTHORACIC VASCULAR SURGERY)

## 2019-01-01 PROCEDURE — 25000128 H RX IP 250 OP 636: Performed by: SURGERY

## 2019-01-01 PROCEDURE — 80053 COMPREHEN METABOLIC PANEL: CPT | Performed by: SURGERY

## 2019-01-01 PROCEDURE — 85027 COMPLETE CBC AUTOMATED: CPT | Performed by: SURGERY

## 2019-01-01 PROCEDURE — 80053 COMPREHEN METABOLIC PANEL: CPT | Performed by: THORACIC SURGERY (CARDIOTHORACIC VASCULAR SURGERY)

## 2019-01-01 PROCEDURE — 25000132 ZZH RX MED GY IP 250 OP 250 PS 637

## 2019-01-01 PROCEDURE — 94003 VENT MGMT INPAT SUBQ DAY: CPT

## 2019-01-01 PROCEDURE — 85027 COMPLETE CBC AUTOMATED: CPT | Performed by: THORACIC SURGERY (CARDIOTHORACIC VASCULAR SURGERY)

## 2019-01-01 PROCEDURE — 93308 TTE F-UP OR LMTD: CPT

## 2019-01-01 PROCEDURE — 84100 ASSAY OF PHOSPHORUS: CPT | Performed by: THORACIC SURGERY (CARDIOTHORACIC VASCULAR SURGERY)

## 2019-01-01 PROCEDURE — 83735 ASSAY OF MAGNESIUM: CPT | Performed by: SURGERY

## 2019-01-01 PROCEDURE — 93325 DOPPLER ECHO COLOR FLOW MAPG: CPT | Mod: 26 | Performed by: INTERNAL MEDICINE

## 2019-01-01 PROCEDURE — 82805 BLOOD GASES W/O2 SATURATION: CPT | Performed by: THORACIC SURGERY (CARDIOTHORACIC VASCULAR SURGERY)

## 2019-01-01 PROCEDURE — 93005 ELECTROCARDIOGRAM TRACING: CPT

## 2019-01-01 PROCEDURE — P9016 RBC LEUKOCYTES REDUCED: HCPCS | Performed by: SURGERY

## 2019-01-01 PROCEDURE — 82330 ASSAY OF CALCIUM: CPT | Performed by: THORACIC SURGERY (CARDIOTHORACIC VASCULAR SURGERY)

## 2019-01-01 PROCEDURE — 25000128 H RX IP 250 OP 636: Performed by: THORACIC SURGERY (CARDIOTHORACIC VASCULAR SURGERY)

## 2019-01-01 PROCEDURE — 25000132 ZZH RX MED GY IP 250 OP 250 PS 637: Performed by: THORACIC SURGERY (CARDIOTHORACIC VASCULAR SURGERY)

## 2019-01-01 PROCEDURE — 20000004 ZZH R&B ICU UMMC

## 2019-01-01 PROCEDURE — 00000146 ZZHCL STATISTIC GLUCOSE BY METER IP

## 2019-01-01 PROCEDURE — 93010 ELECTROCARDIOGRAM REPORT: CPT | Performed by: INTERNAL MEDICINE

## 2019-01-01 PROCEDURE — 25000125 ZZHC RX 250: Performed by: THORACIC SURGERY (CARDIOTHORACIC VASCULAR SURGERY)

## 2019-01-01 PROCEDURE — 85610 PROTHROMBIN TIME: CPT | Performed by: SURGERY

## 2019-01-01 PROCEDURE — 40000048 ZZH STATISTIC DAILY SWAN MONITORING

## 2019-01-01 PROCEDURE — 40000014 ZZH STATISTIC ARTERIAL MONITORING DAILY

## 2019-01-01 PROCEDURE — 85610 PROTHROMBIN TIME: CPT | Performed by: THORACIC SURGERY (CARDIOTHORACIC VASCULAR SURGERY)

## 2019-01-01 PROCEDURE — C9113 INJ PANTOPRAZOLE SODIUM, VIA: HCPCS | Performed by: THORACIC SURGERY (CARDIOTHORACIC VASCULAR SURGERY)

## 2019-01-01 PROCEDURE — P9041 ALBUMIN (HUMAN),5%, 50ML: HCPCS | Performed by: SURGERY

## 2019-01-01 RX ORDER — ALBUMIN, HUMAN INJ 5% 5 %
12.5 SOLUTION INTRAVENOUS ONCE
Status: COMPLETED | OUTPATIENT
Start: 2019-01-01 | End: 2019-01-01

## 2019-01-01 RX ORDER — HEPARIN SODIUM 10000 [USP'U]/100ML
500 INJECTION, SOLUTION INTRAVENOUS CONTINUOUS
Status: DISCONTINUED | OUTPATIENT
Start: 2019-01-01 | End: 2019-01-03

## 2019-01-01 RX ORDER — ALBUMIN, HUMAN INJ 5% 5 %
SOLUTION INTRAVENOUS
Status: COMPLETED
Start: 2019-01-01 | End: 2019-01-01

## 2019-01-01 RX ADMIN — HUMAN INSULIN 17 UNITS/HR: 100 INJECTION, SOLUTION SUBCUTANEOUS at 00:39

## 2019-01-01 RX ADMIN — VANCOMYCIN HYDROCHLORIDE 1000 MG: 1 INJECTION, SOLUTION INTRAVENOUS at 22:14

## 2019-01-01 RX ADMIN — SODIUM CHLORIDE 600 MG: 9 INJECTION, SOLUTION INTRAVENOUS at 15:03

## 2019-01-01 RX ADMIN — HEPARIN SODIUM 500 UNITS/HR: 10000 INJECTION, SOLUTION INTRAVENOUS at 22:08

## 2019-01-01 RX ADMIN — AMIODARONE HYDROCHLORIDE 200 MG: 200 TABLET ORAL at 08:17

## 2019-01-01 RX ADMIN — POTASSIUM CHLORIDE 20 MEQ: 1.5 POWDER, FOR SOLUTION ORAL at 04:42

## 2019-01-01 RX ADMIN — DIGOXIN 125 MCG: 125 TABLET ORAL at 08:17

## 2019-01-01 RX ADMIN — Medication 50 MCG/HR: at 22:03

## 2019-01-01 RX ADMIN — ALBUMIN HUMAN 12.5 G: 0.05 INJECTION, SOLUTION INTRAVENOUS at 14:56

## 2019-01-01 RX ADMIN — POTASSIUM CHLORIDE 40 MEQ: 750 TABLET, EXTENDED RELEASE ORAL at 08:17

## 2019-01-01 RX ADMIN — DEXTROSE MONOHYDRATE 25 ML: 500 INJECTION PARENTERAL at 04:00

## 2019-01-01 RX ADMIN — SENNOSIDES AND DOCUSATE SODIUM 1 TABLET: 8.6; 5 TABLET ORAL at 20:16

## 2019-01-01 RX ADMIN — ACETAMINOPHEN 975 MG: 325 TABLET, FILM COATED ORAL at 23:50

## 2019-01-01 RX ADMIN — LIDOCAINE 2 PATCH: 560 PATCH PERCUTANEOUS; TOPICAL; TRANSDERMAL at 08:18

## 2019-01-01 RX ADMIN — POLYETHYLENE GLYCOL 3350 17 G: 17 POWDER, FOR SOLUTION ORAL at 20:17

## 2019-01-01 RX ADMIN — ACETAMINOPHEN 975 MG: 325 TABLET, FILM COATED ORAL at 08:17

## 2019-01-01 RX ADMIN — PROPOFOL 40 MCG/KG/MIN: 10 INJECTION, EMULSION INTRAVENOUS at 06:20

## 2019-01-01 RX ADMIN — MAGNESIUM SULFATE HEPTAHYDRATE 2 G: 40 INJECTION, SOLUTION INTRAVENOUS at 11:02

## 2019-01-01 RX ADMIN — PROPOFOL 30 MCG/KG/MIN: 10 INJECTION, EMULSION INTRAVENOUS at 22:03

## 2019-01-01 RX ADMIN — PROPOFOL 40 MCG/KG/MIN: 10 INJECTION, EMULSION INTRAVENOUS at 01:28

## 2019-01-01 RX ADMIN — GABAPENTIN 100 MG: 100 CAPSULE ORAL at 20:16

## 2019-01-01 RX ADMIN — POTASSIUM CHLORIDE 20 MEQ: 29.8 INJECTION, SOLUTION INTRAVENOUS at 16:36

## 2019-01-01 RX ADMIN — SENNOSIDES AND DOCUSATE SODIUM 2 TABLET: 8.6; 5 TABLET ORAL at 08:18

## 2019-01-01 RX ADMIN — FLUCONAZOLE 200 MG: 2 INJECTION, SOLUTION INTRAVENOUS at 14:04

## 2019-01-01 RX ADMIN — EPINEPHRINE 0.03 MCG/KG/MIN: 1 INJECTION PARENTERAL at 19:03

## 2019-01-01 RX ADMIN — ACETAMINOPHEN 975 MG: 325 TABLET, FILM COATED ORAL at 16:29

## 2019-01-01 RX ADMIN — ALBUMIN HUMAN 12.5 G: 50 SOLUTION INTRAVENOUS at 16:28

## 2019-01-01 RX ADMIN — VANCOMYCIN HYDROCHLORIDE 1000 MG: 1 INJECTION, SOLUTION INTRAVENOUS at 09:57

## 2019-01-01 RX ADMIN — ROSUVASTATIN CALCIUM 20 MG: 20 TABLET, FILM COATED ORAL at 20:16

## 2019-01-01 RX ADMIN — ASPIRIN 81 MG 81 MG: 81 TABLET ORAL at 08:17

## 2019-01-01 RX ADMIN — DEXMEDETOMIDINE HYDROCHLORIDE 0.4 MCG/KG/HR: 4 INJECTION, SOLUTION INTRAVENOUS at 06:20

## 2019-01-01 RX ADMIN — GABAPENTIN 100 MG: 100 CAPSULE ORAL at 13:55

## 2019-01-01 RX ADMIN — HUMAN INSULIN 11 UNITS/HR: 100 INJECTION, SOLUTION SUBCUTANEOUS at 02:45

## 2019-01-01 RX ADMIN — DEXMEDETOMIDINE HYDROCHLORIDE 0.7 MCG/KG/HR: 4 INJECTION, SOLUTION INTRAVENOUS at 19:03

## 2019-01-01 RX ADMIN — PANTOPRAZOLE SODIUM 40 MG: 40 INJECTION, POWDER, FOR SOLUTION INTRAVENOUS at 08:18

## 2019-01-01 RX ADMIN — POLYETHYLENE GLYCOL 3350 17 G: 17 POWDER, FOR SOLUTION ORAL at 08:18

## 2019-01-01 RX ADMIN — ALBUMIN HUMAN 12.5 G: 0.05 INJECTION, SOLUTION INTRAVENOUS at 16:28

## 2019-01-01 RX ADMIN — GABAPENTIN 100 MG: 100 CAPSULE ORAL at 08:18

## 2019-01-01 RX ADMIN — MUPIROCIN 1 G: 20 OINTMENT TOPICAL at 09:49

## 2019-01-01 RX ADMIN — LEVOFLOXACIN 500 MG: 5 INJECTION, SOLUTION INTRAVENOUS at 13:48

## 2019-01-01 ASSESSMENT — ACTIVITIES OF DAILY LIVING (ADL)
ADLS_ACUITY_SCORE: 15

## 2019-01-01 ASSESSMENT — MIFFLIN-ST. JEOR: SCORE: 1573

## 2019-01-01 NOTE — PROGRESS NOTES
ADVANCED HEART FAILURE PROGRESS NOTE    SUBJECTIVE:  No acute overnight events.    ROS otherwise negative.    OBJECTIVE:  Vital signs:  Temp: 99  F (37.2  C) Temp  Min: 96.7  F (35.9  C)  Max: 99  F (37.2  C)  Heart Rate: 73 Heart Rate  Min: 73  Max: 93    No data recorded.  No data recorded.    Resp: 16 Resp  Min: 14  Max: 18  SpO2: 100 % SpO2  Min: 99 %  Max: 100 %      HM3: 5100 RPM, 3.7 LPM, PI 2.5-3.5 (intermittently down to 1.5 and up to 10)    [place]: CVP 10, PA 28/12, PCW 9, PA sat 57%, CO/CI 4.2/2.1      Intake/Output Summary (Last 24 hours) at 1/1/2019 0637  Last data filed at 1/1/2019 0600  Gross per 24 hour   Intake 8676.51 ml   Output 4387 ml   Net 4289.51 ml       Vitals:    12/30/18 0138 12/31/18 0431 01/01/19 0345   Weight: 76.6 kg (168 lb 14.4 oz) 75.6 kg (166 lb 10.7 oz) 83.2 kg (183 lb 6.8 oz)         Physical Exam:  Gen: intubated, sedated  HEENT: ETT in place  Resp: bilateral ventilated breath sounds  CVS: normal rate, VAD hum, no JVD  Abdo: soft, nontender  Extremities: warm, no edema   Neuro: sedated, awakens and follow commands      Medications:    aminocaproic acid (AMICAR) infusion ADULT Stopped (12/31/18 1438)     dexmedetomidine 0.4 mcg/kg/hr (01/01/19 0620)     IV fluid REPLACEMENT ONLY       EPINEPHrine IV infusion ADULT 0.03 mcg/kg/min (01/01/19 0600)     fentaNYL 50 mcg/hr (01/01/19 0600)     insulin (regular) 0.5 Units/hr (01/01/19 0600)     - MEDICATION INSTRUCTIONS -       propofol (DIPRIVAN) infusion 40 mcg/kg/min (01/01/19 0620)     BETA BLOCKER NOT PRESCRIBED       vasopressin (PITRESSIN) infusion ADULT (40 mL) 1 Units/hr (01/01/19 0600)       Current Facility-Administered Medications   Medication Dose Route Frequency     acetaminophen  975 mg Oral Q8H     amiodarone  200 mg Oral Daily     aspirin  81 mg Oral Daily     digoxin  125 mcg Oral Daily     fluconazole  200 mg Intravenous Q24H     gabapentin  100 mg Oral TID     influenza vaccine adult (product based on age)  0.5 mL  Intramuscular Prior to discharge     levofloxacin  500 mg Intravenous Q24H     lidocaine  2 patch Transdermal Q24H     lidocaine   Transdermal Q8H     lidocaine   Transdermal Q24h     mupirocin  1 g Both Nostrils BID     pantoprazole (PROTONIX) IV  40 mg Intravenous Daily     polyethylene glycol  17 g Oral BID     potassium & sodium phosphates  2 packet Oral or Feeding Tube 4x Daily     potassium chloride  40 mEq Oral Daily     rifampin  600 mg Intravenous Q24H     rosuvastatin  20 mg Oral At Bedtime     senna-docusate  1 tablet Oral BID    Or     senna-docusate  2 tablet Oral BID     sodium chloride (PF)  10 mL Intravenous Once     sodium chloride (PF)  3 mL Intracatheter Q8H     sodium chloride (PF)  3 mL Intracatheter Q8H     vancomycin (VANCOCIN) IV  1,000 mg Intravenous Q12H         Labs:  CMP  Recent Labs   Lab 01/01/19 0343 12/31/18 2147  12/30/18 2007   * 149*   < > 137   POTASSIUM 3.6 3.6   < > 3.6   CHLORIDE 114* 117*   < > 102   CO2 26 26   < > 28   BUN 22 22   < > 40*   CR 1.48* 1.32*   < > 1.67*   ARLENE 8.3* 8.4*   < > 8.5   MAG 2.1 2.2   < >  --    PHOS 4.1 0.8*   < >  --    GLC 80 145*   < > 280*   LDH  --   --   --  145   ALKPHOS 26* 23*   < > 43   BILITOTAL 1.8* 1.8*   < > 0.7   AST 30 29   < > 20   ALT 17 18   < > 31    < > = values in this interval not displayed.     BLOOD GAS  Recent Labs   Lab 01/01/19  0552 01/01/19 0343   PH 7.52* 7.56*   PCO2 30* 32*   PO2 102 100     CBC  Recent Labs   Lab 01/01/19 0343 12/31/18 2147   WBC 5.3 6.2   HGB 7.2* 7.8*   HCT 22.2* 23.6*   * 122*     COAG  Recent Labs   Lab 01/01/19 0343 12/31/18  1302 12/31/18  1030   INR 1.46* 1.42* 1.65*   PTT  --  40* 34         ASSESSMENT/PLAN:  58 year old male with a history of ICM, CAD (s/p STEMI with ALYSSA to LAD 6/27/18), monomorphic VT, LV thrombus, CKD Stage III, paroxysmal AF, DM Type II who was admitted for placement of LVAD. Now s/p HM3 LVAD 12/31.     HeartMate III LVAD placed 12/31:  - speed 5100  RPM  - on ASA 81  - holding anticoagulation until appropriate per surgery  - will wean epi and vaso as tolerated  - fluid resuscitation as needed  - significant fluctuations in PI low and high as well as occasional drops in flows, unclear etiology, somewhat responsive to fluids and also positional changes  - transthoracic echo today with very poor windows  - plan for SELVIN in the morning to evaluate LV and RV function and eval for effusion      Ischemic cardiomyopathy, CAD:  - ALYSSA to LAD 6/27/2018  - was not on ACE-I due to CKD  - holding BB after LVAD  - continue rosuvastatin  - was on plavix, holding now after surgery     Paroxysmal afib:  - currently in sinus rhythm  - holding BB after LVAD  - will resume anticoagulation when able     CKD  - stable with good urine output        Seen and staffed with Dr. Miroslava Romo MD  Advanced Heart Failure Fellow  [phone]

## 2019-01-01 NOTE — PLAN OF CARE
Neuro - Tmax 37.5c, pupils 1+ equal/reactive, Propofol 40, Precedex 0.4, Fentanyl 50, Pt moves all extremities.  Cardiac - SR 70-90, MAPs greater than 65, Epi 0.03, Vaso 1, PI 1.5-2.3 briefly ( MD notified, 250 Albumin given), PI's 2.5-5's since, Flow 3-4's, Power 3's, RPM 5100. Phos 0.1 (25+25 mmol given, Neutraphos packets given x2), K replaced 2x, 2X Med, 1X pleural, 1X pericardial CT with 10-30ml/hr. See flowsheet for [place] numbers.  Respiratory - CMV, RR decreased to 16, Fio2 decreased to 35%, PEEP 5, Tidal Volume 500, Coarse/diminished lung sounds, Scant secretions.  GI - Hypoactive bowel sounds. Insulin gtt 0-17u/hr, 25ml D50 given for BG 67.   - Perez with 30-80ml/hr.  Plan - SBT today. Continue to monitor and notify MD of questions or concerns.

## 2019-01-01 NOTE — PLAN OF CARE
PT 4E: PT will hold on eval today as pt appears unable to tolerate two disciplines per chart review and collaboration with staff. As OT is CR primary, OT will plan to attempt eval today after extubation today.

## 2019-01-01 NOTE — PROGRESS NOTES
CVTS PROGRESS NOTE  01/01/2019    CO-MORBIDITIES:   Chronic systolic heart failure (H)  (primary encounter diagnosis)  STEMI  ICD  DM     ASSESSMENT: Mariusz Sharp is a 58 year old male now s/p LVAD (HM 3) placement with Dr. Baker on 12/31/2018 for a history of ischemic cardiomyopathy with reduced ejection fraction. There were no major intraoperative events to report and the patient was able to be liberated from cardiopulmonary bypass without significant difficulty.    TODAY'S PROGRESS:   - 1u RBCs  - PST  - discuss need for digoxin with cards  - possible hep 500 straight rate this evening if no concerns for bleeding  - ECHO for low flow, high PI event to eval LVAD    PLAN:  Neuro/pain/sedation:  - Monitor neurological status. Notify the MD for any acute changes in exam.  - Fentanyl gtt for pain.  - Propofol/Precedex gtt for sedation. Plan to extubate on precedex.  - Tylenol scheduled. Oxy/dil PRN. Robaxin PRN.   - Lidoderm     Pulmonary:   - Supplemental oxygen to keep saturation above 92 %.  - Incentive spirometer every 15- 30 minutes when awake.  - Mechanically ventilated, PST. Extubate on precedex    Cardiovascular:  #LVAD  - 2x low flow, high PI events, will obtain ECHO  - Monitor hemodynamic status.   - Epi/vaso gtts. Wean as vaso first.  - MAP > 65.  - PTA amiodarone and digoxin. Will discuss with cards.  - Holding metoplolol     GI care:   - Miralax/senna-colace, suppository PRN     Fluids/ Electrolytes/ Nutrition:   - TKO  - NPO. ADAT once extubated.  - No indication for parenteral nutrition.    Renal:    - Urine output is adequate so far.  - Will continue to monitor intake and output.  - keep Perez  - Goal even     Endocrine:    - Insulin gtt     ID/ Antibiotics:  - Periop antibiotics.     Heme:     - Hemoglobin 7.2, give 1u RBC  - Anticoagulation: possible heparin 500 straight rate this afternoon     Prophylaxis:    - Mechanical prophylaxis for DVT.   - heparin gtt  - PPI     Lines/tubes/drains:  -  right IV CVC with [place], arterial line, pivs,  Perez, ett.   - Chest tubes: med x2, left pleural, pericardial tee     Disposition:  -  CV ICU    Asia Portillo MD  General Surgery, PGY-3  Pg [phone]    ====================================    SUBJECTIVE:   Agitated when sedation weaned. Associated with low flow, high PI events. No other acute event. Tolerated weaning pressors.    OBJECTIVE:   1. VITAL SIGNS:   Temp:  [96.7  F (35.9  C)-99.5  F (37.5  C)] 98.8  F (37.1  C)  Heart Rate:  [67-93] 67  Resp:  [14-18] 16  MAP:  [58 mmHg-93 mmHg] 93 mmHg  Arterial Line BP: ()/(54-76) 102/71  FiO2 (%):  [35 %-50 %] 35 %  SpO2:  [98 %-100 %] 100 %  Ventilation Mode: CMV/AC  (Continuous Mandatory Ventilation/ Assist Control)  FiO2 (%): 35 %  Rate Set (breaths/minute): 16 breaths/min  Tidal Volume Set (mL): 500 mL  PEEP (cm H2O): 5 cmH2O  Oxygen Concentration (%): 35 %  Resp: 16      2. INTAKE/ OUTPUT:   I/O last 3 completed shifts:  In: 8676.51 [I.V.:5114.51; Other:200; NG/GT:300; IV Piggyback:500]  Out: 4387 [Urine:2130; Emesis/NG output:375; Blood:1000; Chest Tube:882]    3. PHYSICAL EXAMINATION:   General: intubated and sedated  Neuro:  BASS, NAD  Resp: Breathing non-labored on vent  CV: RRR  Abdomen: Soft, Non-distended, Non-tender  Incisions: c/d/i  Extremities: warm and well perfused    4. INVESTIGATIONS:   Arterial Blood Gases   Recent Labs   Lab 01/01/19  0941 01/01/19  0814 01/01/19  0552 01/01/19  0343   PH 7.42 7.50* 7.52* 7.56*   PCO2 40 29* 30* 32*   PO2 150* 129* 102 100   HCO3 26 22 24 28     Complete Blood Count   Recent Labs   Lab 01/01/19  0941 01/01/19  0343 12/31/18  2147 12/31/18  1620   WBC 7.3 5.3 6.2 8.7   HGB 8.1* 7.2* 7.8* 8.4*   * 112* 122* 167     Basic Metabolic Panel  Recent Labs   Lab 01/01/19  0343 12/31/18  2147 12/31/18  1816 12/31/18  1620  12/31/18  1302   * 149*  --  148*  --  146*   POTASSIUM 3.6 3.6 2.5* 3.2*   < > 3.1*   CHLORIDE 114* 117*  --  115*  --  113*    CO2 26 26  --  21  --  22   BUN 22 22  --  26  --  28   CR 1.48* 1.32*  --  1.45*  --  1.50*   GLC 80 145*  --  187*  --  223*    < > = values in this interval not displayed.     Liver Function Tests  Recent Labs   Lab 01/01/19  0343 12/31/18  2147 12/31/18  1620 12/31/18  1302 12/31/18  1030 12/30/18 2007   AST 30 29 31 32  --  20   ALT 17 18 23 26  --  31   ALKPHOS 26* 23* 23* 33*  --  43   BILITOTAL 1.8* 1.8* 1.9* 1.4*  --  0.7   ALBUMIN 2.9* 2.7* 3.0* 2.6*  --  3.6   INR 1.46*  --   --  1.42* 1.65* 1.04     Pancreatic Enzymes  No lab results found in last 7 days.  Coagulation Profile  Recent Labs   Lab 01/01/19  0343 12/31/18  1302 12/31/18  1030 12/30/18 2007   INR 1.46* 1.42* 1.65* 1.04   PTT  --  40* 34  --          5. RADIOLOGY:   Recent Results (from the past 24 hour(s))   XR Chest Port 1 View    Narrative    EXAMINATION: XR CHEST PORT 1 VW on 12/31/2018 2:05 PM.    INDICATION: s/p LVAD placement on 12/31/2018 for ischemic  cardiomyopathy.    COMPARISON: Chest x-rays dated 12/30/2018. CT dated 7/22/2018.    FINDINGS: Portable AP supine view of the chest.     Postsurgical changes LVAD surgery. Median sternotomy wires appear  intact. Mediastinal surgical clips. 2 mediastinal drains. New LVAD  appears well positioned. New endotracheal tube tip projects 2 cm above  the kris. Right IJ [place] catheter tip projects over the right  interlobar or right lower lobe artery. Left chest wall implanted  cardiac device.    Trachea is midline. Cardiomediastinal silhouette is mildly enlarged  and stable. Pulmonary vasculature is indistinct. No appreciable  pneumothorax. Low lung volumes. Left basilar opacities appear  increased.      Impression    IMPRESSION:  1. Postsurgical changes of the LVAD surgery.   2. New endotracheal tube tip projects 2 cm above the kris.  3. Right [place] catheter tip projects over the right interlobar or  right lower lobe artery.  4. Left basilar opacities appear increased, favor  atelectasis..    I have personally reviewed the examination and initial interpretation  and I agree with the findings.    ROBB MCCURDY MD   XR Chest Port 1 View    Narrative    Examination: XR CHEST PORT 1 VW, 12/31/2018 3:14 PM    Comparison: 12/31/2018 at 1329, 12/30/2018, CT 7/22/2018    History: ETT position    Findings: Endotracheal tube tip at the level of the mid thoracic  trachea, 4-5 cm above the kris. [place] catheter tip projects over  the distal right pulmonary artery. Left chest wall  pacemaker/implantable cardiac defibrillator. Left ventricular assist  device. Mediastinal chest tubes. Cardiac silhouette is stable. Stable  streaky bilateral perihilar opacities. Stable left basilar  consolidation. No pneumothorax or substantial pleural effusion  demonstrated.      Impression    Impression:   1. Endotracheal tube repositioned, now 4-5 cm above the kris.  2. [place] catheter repositioned, now at the level of the distal  right main pulmonary artery.  3. Unchanged left basilar consolidation, favor atelectasis.    ROBB MCCURDY MD   XR Chest Port 1 View    Narrative    Exam: XR CHEST PORT 1 VW, 12/31/2018 4:40 PM    Indication: after ETT advancement    Comparison: Chest x-ray 12/31/2019    Findings:   Portable AP semiupright radiograph of the chest. Right IJ [place]  catheter tip projecting over the right pulmonary artery, slightly  retracted since the prior exam. Left chest wall cardiac device with  leads in stable position. Stable position of LVAD device. Endotracheal  tube with tip projecting report for 3.5 cm above the kris, advanced  since the prior exam.  Orogastric tube tip and sidehole project over  the stomach. Sternotomy wires and mediastinal surgical clips. The  cardiovascular silhouette is stable. The pulmonary vasculature is  within normal limits. No pneumothorax. Small left pleural effusion.  Hazy left retrocardiac opacities. Visualized upper abdomen appears  unremarkable       Impression    Impression:   1. Endotracheal tube tip projects 3.5 cm above the kris, slightly  advanced since the prior exam. Right IJ [place]vin catheter tip  projecting over the right pulmonary artery, slightly retracted since  the prior exam. Other support devices are stable as described above.  2. Unchanged left retrocardiac atelectasis/consolidation.    I have personally reviewed the examination and initial interpretation  and I agree with the findings.    CORINNA PERAZA MD   XR Chest Port 1 View    Narrative    XR CHEST PORT 1 VW 1/1/2019 1:15 AM    History: s/p LVAD    Comparison: 12/31/2018    Findings: Single portable AP view of the chest, semiupright. Right IJ  sheath and [place] catheter catheter tip is in stable position,  projecting over the proximal right main pulmonary artery. An  endotracheal tube is in place, projecting 3.6 cm above the kris.  Stable position of left chest wall implanted cardiac device and its  associated leads.  Tube projected over left mid lung is unchanged.   One of the mediastinal drains appears to have been removed.  Stable  position and orientation of the LVAD. Gastric tube tip projects over  the stomach, the sidehole projects over the gastric cardia. Median  sternotomy wires. Gallstones projecting over the right upper quadrant.  Stable enlarged cardiac silhouette. Stable retrocardiac opacities. No  definite pneumothorax. Stable small left pleural effusion.      Impression    Impression:   1. Support devices as above. Stable position and orientation of the  LVAD.  2. Persistent retrocardiac opacities which may represent atelectasis  or consolidation.  3. Radiodense gallstones projecting over the right upper quadrant.    I have personally reviewed the examination and initial interpretation  and I agree with the findings.    ANA JOY MD       =========================================

## 2019-01-01 NOTE — PROGRESS NOTES
ADVANCED HEART FAILURE PROGRESS NOTE    SUBJECTIVE:  Patient went for LVAD placement today. Surgery went well. Returned to ICU on epi 0.1 and vaso 1.2. PIs a bit low around 2 and slightly low filling pressures on swan catheter. Improved with volume resuscitation.     ROS otherwise negative.    OBJECTIVE:  Vital signs:  Temp: 97.6  F (36.4  C) Temp  Min: 96.7  F (35.9  C)  Max: 99.1  F (37.3  C)  Heart Rate: 89 Heart Rate  Min: 53  Max: 93  BP: 99/69 Systolic (24hrs), Av , Min:93 , Max:118   Diastolic (24hrs), Av, Min:63, Max:81    Resp: 16 Resp  Min: 14  Max: 18  SpO2: 100 % SpO2  Min: 91 %  Max: 100 %      HeartMate III: 5100 RPM, 3.8 LPM, PI 2-3, 3.5 gamez      Intake/Output Summary (Last 24 hours) at 2018 1819  Last data filed at 2018 1800  Gross per 24 hour   Intake 6392.79 ml   Output 3582 ml   Net 2810.79 ml       Vitals:    18 0138 18 0431   Weight: 76.6 kg (168 lb 14.4 oz) 75.6 kg (166 lb 10.7 oz)       Physical Exam:  Gen: intubated, sedated  HEENT: ETT in place  Resp: bilateral ventilated breath sounds  CVS: normal rate, VAD hum, no JVD  Abdo: soft, nontender  Extremities: warm, no edema   Neuro: sedated       Medications:    aminocaproic acid (AMICAR) infusion ADULT Stopped (18 1438)     dexmedetomidine 0.4 mcg/kg/hr (18 170)     IV fluid REPLACEMENT ONLY       EPINEPHrine IV infusion ADULT 0.1 mcg/kg/min (18 170)     fentaNYL 50 mcg/hr (18)     insulin (regular) 14 Units/hr (18)     - MEDICATION INSTRUCTIONS -       propofol (DIPRIVAN) infusion 50 mcg/kg/min (18 170)     BETA BLOCKER NOT PRESCRIBED       vasopressin (PITRESSIN) infusion ADULT (40 mL) 1.2 Units/hr (18)       Current Facility-Administered Medications   Medication Dose Route Frequency     acetaminophen  975 mg Oral Q8H     amiodarone  200 mg Oral Daily     [START ON 2019] aspirin  81 mg Oral Daily     digoxin  125 mcg Oral Daily     fluconazole   200 mg Intravenous Q24H     gabapentin  100 mg Oral TID     influenza vaccine adult (product based on age)  0.5 mL Intramuscular Prior to discharge     levofloxacin  500 mg Intravenous Q24H     [START ON 1/1/2019] lidocaine  2 patch Transdermal Q24H     lidocaine   Transdermal Q8H     lidocaine   Transdermal Q24h     mupirocin  1 g Both Nostrils BID     pantoprazole (PROTONIX) IV  40 mg Intravenous Daily     [START ON 1/1/2019] polyethylene glycol  17 g Oral BID     potassium chloride  40 mEq Oral Daily     rifampin  600 mg Intravenous Q24H     rosuvastatin  20 mg Oral At Bedtime     senna-docusate  1 tablet Oral BID    Or     senna-docusate  2 tablet Oral BID     sodium chloride (PF)  10 mL Intravenous Once     sodium chloride (PF)  3 mL Intracatheter Q8H     sodium chloride (PF)  3 mL Intracatheter Q8H     vancomycin (VANCOCIN) IV  1,000 mg Intravenous Q12H         Labs:  CMP  Recent Labs   Lab 12/31/18  1620 12/31/18  1518 12/31/18  1302  12/31/18  0419 12/30/18 2007   *  --  146*   < > 140 137   POTASSIUM 3.2* 2.9* 3.1*   < > 3.2* 3.6   CHLORIDE 115*  --  113*  --  108 102   CO2 21  --  22  --  25 28   BUN 26  --  28  --  36* 40*   CR 1.45*  --  1.50*  --  1.56* 1.67*   ARLENE 8.5  --  9.2  --  8.1* 8.5   MAG 1.8  --  2.0  --  1.9  --    PHOS  --   --  2.6  --  3.5  --    *  --  223*   < > 178* 280*   LDH  --   --   --   --   --  145   ALKPHOS 23*  --  33*  --   --  43   BILITOTAL 1.9*  --  1.4*  --   --  0.7   AST 31  --  32  --   --  20   ALT 23  --  26  --   --  31    < > = values in this interval not displayed.     BLOOD GAS  Recent Labs   Lab 12/31/18  1620 12/31/18  1518   PH 7.40 7.42   PCO2 34* 31*   PO2 188* 178*     CBC  Recent Labs   Lab 12/31/18  1620 12/31/18  1302   WBC 8.7 9.7   HGB 8.4* 9.6*   HCT 26.1* 30.2*    191     COAG  Recent Labs   Lab 12/31/18  1302 12/31/18  1030   INR 1.42* 1.65*   PTT 40* 34         ASSESSMENT/PLAN:  58 year old male with a history of ICM, CAD (s/p  STEMI with ALYSSA to LAD 6/27/18), monomorphic VT, LV thrombus, CKD Stage III, paroxysmal AF, DM Type II who was admitted for placement of LVAD. Now s/p HM3 LVAD 12/31.    HeartMate III LVAD placed 12/31:  - speed 5100 RPM, will increase as tolerated  - on ASA 81  - holding anticoagulation until appropriate per surgery  - will wean epi and vaso as tolerated  - fluid resuscitation as needed    Ischemic cardiomyopathy, CAD:  - ALYSSA to LAD 6/27/2018  - was not on ACE-I due to CKD  - holding BB after LVAD  - continue rosuvastatin  - was on plavix, holding now after surgery    Paroxysmal afib:  - currently in sinus rhythm  - holding BB after LVAD  - will resume anticoagulation when able    CKD  - monitoring creatinine and urine output post-op      Seen and staffed with Dr. Miroslava Romo MD  Advanced Heart Failure Fellow  [phone]

## 2019-01-01 NOTE — PROGRESS NOTES
CV ICU PROGRESS NOTE  01/01/2019    CO-MORBIDITIES:   Chronic systolic heart failure (H)  (primary encounter diagnosis)  STEMI  ICD  DM     ASSESSMENT: Mariusz Sharp is a 58 year old male now s/p LVAD (HM 3) placement with Dr. Baker on 12/31/2018 for a history of ischemic cardiomyopathy with reduced ejection fraction. There were no major intraoperative events to report and the patient was able to be liberated from cardiopulmonary bypass without significant difficulty.    TODAY'S PROGRESS:   - 1u RBCs  - PST  - discuss need for digoxin with cards  - possible hep 500 straight rate this evening if no concerns for bleeding  - ECHO for low flow, high PI event to eval LVAD    PLAN:  Neuro/pain/sedation:  - Monitor neurological status. Notify the MD for any acute changes in exam.  - Fentanyl gtt for pain.  - Propofol/Precedex gtt for sedation. Plan to extubate on precedex.  - Tylenol scheduled. Oxy/dil PRN. Robaxin PRN.   - Lidoderm     Pulmonary:   - Supplemental oxygen to keep saturation above 92 %.  - Incentive spirometer every 15- 30 minutes when awake.  - Mechanically ventilated, PST. Extubate on precedex    Cardiovascular:  #LVAD  - 2x low flow, high PI events, will obtain ECHO  - Monitor hemodynamic status.   - Epi/vaso gtts. Wean as vaso first.  - MAP > 65.  - PTA amiodarone and digoxin. Will discuss with cards.  - Holding metoplolol     GI care:   - Miralax/senna-colace, suppository PRN     Fluids/ Electrolytes/ Nutrition:   - TKO  - NPO. ADAT once extubated.  - No indication for parenteral nutrition.    Renal:    - Urine output is adequate so far.  - Will continue to monitor intake and output.  - keep Perez  - Goal even     Endocrine:    - Insulin gtt     ID/ Antibiotics:  - Periop antibiotics.     Heme:     - Hemoglobin 7.2, give 1u RBC  - Anticoagulation: possible heparin 500 straight rate this afternoon     Prophylaxis:    - Mechanical prophylaxis for DVT.   - heparin gtt  - PPI     Lines/tubes/drains:  -  right IV CVC with Roebling, arterial line, pivs,  Perez, ett.   - Chest tubes: med x2, left pleural, pericardial tee     Disposition:  -  CV ICU    Asia Portillo MD  General Surgery, PGY-3  Pg [phone]    ====================================    SUBJECTIVE:   Agitated when sedation weaned. Associated with low flow, high PI events. No other acute event. Tolerated weaning pressors.    OBJECTIVE:   1. VITAL SIGNS:   Temp:  [96.7  F (35.9  C)-99.5  F (37.5  C)] 99.5  F (37.5  C)  Heart Rate:  [73-93] 75  Resp:  [14-18] 16  MAP:  [65 mmHg-87 mmHg] 86 mmHg  Arterial Line BP: (68-97)/(54-76) 95/67  FiO2 (%):  [35 %-50 %] 35 %  SpO2:  [99 %-100 %] 100 %  Ventilation Mode: CMV/AC  (Continuous Mandatory Ventilation/ Assist Control)  FiO2 (%): 35 %  Rate Set (breaths/minute): 16 breaths/min  Tidal Volume Set (mL): 500 mL  PEEP (cm H2O): 5 cmH2O  Oxygen Concentration (%): 35 %  Resp: 16      2. INTAKE/ OUTPUT:   I/O last 3 completed shifts:  In: 8676.51 [I.V.:5114.51; Other:200; NG/GT:300; IV Piggyback:500]  Out: 4387 [Urine:2130; Emesis/NG output:375; Blood:1000; Chest Tube:882]    3. PHYSICAL EXAMINATION:   General: intubated and sedated  Neuro:  BASS, NAD  Resp: Breathing non-labored on vent  CV: RRR  Abdomen: Soft, Non-distended, Non-tender  Incisions: c/d/i  Extremities: warm and well perfused    4. INVESTIGATIONS:   Arterial Blood Gases   Recent Labs   Lab 01/01/19  0814 01/01/19  0552 01/01/19  0343 12/31/18  2147   PH 7.50* 7.52* 7.56* 7.47*   PCO2 29* 30* 32* 37   PO2 129* 102 100 150*   HCO3 22 24 28 27     Complete Blood Count   Recent Labs   Lab 01/01/19  0343 12/31/18  2147 12/31/18  1620 12/31/18  1302   WBC 5.3 6.2 8.7 9.7   HGB 7.2* 7.8* 8.4* 9.6*   * 122* 167 191     Basic Metabolic Panel  Recent Labs   Lab 01/01/19  0343 12/31/18  2147 12/31/18  1816 12/31/18  1620  12/31/18  1302   * 149*  --  148*  --  146*   POTASSIUM 3.6 3.6 2.5* 3.2*   < > 3.1*   CHLORIDE 114* 117*  --  115*  --  113*    CO2 26 26  --  21  --  22   BUN 22 22  --  26  --  28   CR 1.48* 1.32*  --  1.45*  --  1.50*   GLC 80 145*  --  187*  --  223*    < > = values in this interval not displayed.     Liver Function Tests  Recent Labs   Lab 01/01/19  0343 12/31/18  2147 12/31/18  1620 12/31/18  1302 12/31/18  1030 12/30/18 2007   AST 30 29 31 32  --  20   ALT 17 18 23 26  --  31   ALKPHOS 26* 23* 23* 33*  --  43   BILITOTAL 1.8* 1.8* 1.9* 1.4*  --  0.7   ALBUMIN 2.9* 2.7* 3.0* 2.6*  --  3.6   INR 1.46*  --   --  1.42* 1.65* 1.04     Pancreatic Enzymes  No lab results found in last 7 days.  Coagulation Profile  Recent Labs   Lab 01/01/19  0343 12/31/18  1302 12/31/18  1030 12/30/18 2007   INR 1.46* 1.42* 1.65* 1.04   PTT  --  40* 34  --          5. RADIOLOGY:   Recent Results (from the past 24 hour(s))   XR Chest Port 1 View    Narrative    EXAMINATION: XR CHEST PORT 1 VW on 12/31/2018 2:05 PM.    INDICATION: s/p LVAD placement on 12/31/2018 for ischemic  cardiomyopathy.    COMPARISON: Chest x-rays dated 12/30/2018. CT dated 7/22/2018.    FINDINGS: Portable AP supine view of the chest.     Postsurgical changes LVAD surgery. Median sternotomy wires appear  intact. Mediastinal surgical clips. 2 mediastinal drains. New LVAD  appears well positioned. New endotracheal tube tip projects 2 cm above  the kris. Right IJ [place] catheter tip projects over the right  interlobar or right lower lobe artery. Left chest wall implanted  cardiac device.    Trachea is midline. Cardiomediastinal silhouette is mildly enlarged  and stable. Pulmonary vasculature is indistinct. No appreciable  pneumothorax. Low lung volumes. Left basilar opacities appear  increased.      Impression    IMPRESSION:  1. Postsurgical changes of the LVAD surgery.   2. New endotracheal tube tip projects 2 cm above the kris.  3. Right [place]vin catheter tip projects over the right interlobar or  right lower lobe artery.  4. Left basilar opacities appear increased, favor  atelectasis..    I have personally reviewed the examination and initial interpretation  and I agree with the findings.    ROBB MCCURDY MD   XR Chest Port 1 View    Narrative    Examination: XR CHEST PORT 1 VW, 12/31/2018 3:14 PM    Comparison: 12/31/2018 at 1329, 12/30/2018, CT 7/22/2018    History: ETT position    Findings: Endotracheal tube tip at the level of the mid thoracic  trachea, 4-5 cm above the kris. [employer]-Calvin catheter tip projects over  the distal right pulmonary artery. Left chest wall  pacemaker/implantable cardiac defibrillator. Left ventricular assist  device. Mediastinal chest tubes. Cardiac silhouette is stable. Stable  streaky bilateral perihilar opacities. Stable left basilar  consolidation. No pneumothorax or substantial pleural effusion  demonstrated.      Impression    Impression:   1. Endotracheal tube repositioned, now 4-5 cm above the kris.  2. [employer]-Calvin catheter repositioned, now at the level of the distal  right main pulmonary artery.  3. Unchanged left basilar consolidation, favor atelectasis.    ROBB MCCURDY MD   XR Chest Port 1 View    Narrative    Exam: XR CHEST PORT 1 VW, 12/31/2018 4:40 PM    Indication: after ETT advancement    Comparison: Chest x-ray 12/31/2019    Findings:   Portable AP semiupright radiograph of the chest. Right IJ [employer]-Calvin  catheter tip projecting over the right pulmonary artery, slightly  retracted since the prior exam. Left chest wall cardiac device with  leads in stable position. Stable position of LVAD device. Endotracheal  tube with tip projecting report for 3.5 cm above the kris, advanced  since the prior exam.  Orogastric tube tip and sidehole project over  the stomach. Sternotomy wires and mediastinal surgical clips. The  cardiovascular silhouette is stable. The pulmonary vasculature is  within normal limits. No pneumothorax. Small left pleural effusion.  Hazy left retrocardiac opacities. Visualized upper abdomen appears  unremarkable       Impression    Impression:   1. Endotracheal tube tip projects 3.5 cm above the kris, slightly  advanced since the prior exam. Right IJ [place]vin catheter tip  projecting over the right pulmonary artery, slightly retracted since  the prior exam. Other support devices are stable as described above.  2. Unchanged left retrocardiac atelectasis/consolidation.    I have personally reviewed the examination and initial interpretation  and I agree with the findings.    CORINNA PERAZA MD   XR Chest Port 1 View    Narrative    XR CHEST PORT 1 VW 1/1/2019 1:15 AM    History: s/p LVAD    Comparison: 12/31/2018    Findings: Single portable AP view of the chest, semiupright. Right IJ  sheath and [place]-Calvin catheter catheter tip is in stable position,  projecting over the proximal right main pulmonary artery. An  endotracheal tube is in place, projecting 3.6 cm above the kris.  Stable position of left chest wall implanted cardiac device and its  associated leads.  Tube projected over left mid lung is unchanged.   One of the mediastinal drains appears to have been removed.  Stable  position and orientation of the LVAD. Gastric tube tip projects over  the stomach, the sidehole projects over the gastric cardia. Median  sternotomy wires. Gallstones projecting over the right upper quadrant.  Stable enlarged cardiac silhouette. Stable retrocardiac opacities. No  definite pneumothorax. Stable small left pleural effusion.      Impression    Impression:   1. Support devices as above. Stable position and orientation of the  LVAD.  2. Persistent retrocardiac opacities which may represent atelectasis  or consolidation.  3. Radiodense gallstones projecting over the right upper quadrant.    I have personally reviewed the examination and initial interpretation  and I agree with the findings.    ANA JOY MD       =========================================

## 2019-01-01 NOTE — PLAN OF CARE
OT/CR-4E: Cancel. Per discussion with RN. Pt with possible extubation later in day. Upon check in, extubation more likely tomorrow. Will reschedule and initiate 1/2 as pt would be more appropriate for OT evaluation once extubated.

## 2019-01-02 ENCOUNTER — APPOINTMENT (OUTPATIENT)
Dept: CARDIOLOGY | Facility: CLINIC | Age: 59
End: 2019-01-02
Attending: INTERNAL MEDICINE
Payer: MEDICAID

## 2019-01-02 LAB
ABO + RH BLD: NORMAL
ABO + RH BLD: NORMAL
ALBUMIN SERPL-MCNC: 3 G/DL (ref 3.4–5)
ALBUMIN SERPL-MCNC: 3.1 G/DL (ref 3.4–5)
ALBUMIN SERPL-MCNC: 3.2 G/DL (ref 3.4–5)
ALP SERPL-CCNC: 41 U/L (ref 40–150)
ALP SERPL-CCNC: 47 U/L (ref 40–150)
ALP SERPL-CCNC: 51 U/L (ref 40–150)
ALT SERPL W P-5'-P-CCNC: 113 U/L (ref 0–70)
ALT SERPL W P-5'-P-CCNC: 250 U/L (ref 0–70)
ALT SERPL W P-5'-P-CCNC: 311 U/L (ref 0–70)
ANION GAP SERPL CALCULATED.3IONS-SCNC: 6 MMOL/L (ref 3–14)
ANION GAP SERPL CALCULATED.3IONS-SCNC: 7 MMOL/L (ref 3–14)
ANION GAP SERPL CALCULATED.3IONS-SCNC: 9 MMOL/L (ref 3–14)
APTT PPP: 48 SEC (ref 22–37)
APTT PPP: 55 SEC (ref 22–37)
AST SERPL W P-5'-P-CCNC: 133 U/L (ref 0–45)
AST SERPL W P-5'-P-CCNC: 385 U/L (ref 0–45)
AST SERPL W P-5'-P-CCNC: 476 U/L (ref 0–45)
BASE DEFICIT BLDA-SCNC: 0.1 MMOL/L
BASE DEFICIT BLDA-SCNC: 0.8 MMOL/L
BASE DEFICIT BLDA-SCNC: 2.2 MMOL/L
BASE DEFICIT BLDV-SCNC: 1.2 MMOL/L
BASE DEFICIT BLDV-SCNC: 1.6 MMOL/L
BASE DEFICIT BLDV-SCNC: 3.7 MMOL/L
BASE EXCESS BLDA CALC-SCNC: 0.2 MMOL/L
BASE EXCESS BLDA CALC-SCNC: 1 MMOL/L
BILIRUB SERPL-MCNC: 2.2 MG/DL (ref 0.2–1.3)
BILIRUB SERPL-MCNC: 2.4 MG/DL (ref 0.2–1.3)
BILIRUB SERPL-MCNC: 3.6 MG/DL (ref 0.2–1.3)
BLD GP AB SCN SERPL QL: NORMAL
BLD PROD TYP BPU: NORMAL
BLD UNIT ID BPU: 0
BLOOD BANK CMNT PATIENT-IMP: NORMAL
BLOOD PRODUCT CODE: NORMAL
BPU ID: NORMAL
BUN SERPL-MCNC: 19 MG/DL (ref 7–30)
BUN SERPL-MCNC: 20 MG/DL (ref 7–30)
BUN SERPL-MCNC: 20 MG/DL (ref 7–30)
CALCIUM SERPL-MCNC: 7.8 MG/DL (ref 8.5–10.1)
CALCIUM SERPL-MCNC: 8 MG/DL (ref 8.5–10.1)
CALCIUM SERPL-MCNC: 8.2 MG/DL (ref 8.5–10.1)
CHLORIDE SERPL-SCNC: 110 MMOL/L (ref 94–109)
CHLORIDE SERPL-SCNC: 110 MMOL/L (ref 94–109)
CHLORIDE SERPL-SCNC: 111 MMOL/L (ref 94–109)
CO2 SERPL-SCNC: 23 MMOL/L (ref 20–32)
CO2 SERPL-SCNC: 24 MMOL/L (ref 20–32)
CO2 SERPL-SCNC: 25 MMOL/L (ref 20–32)
COPATH REPORT: NORMAL
CREAT SERPL-MCNC: 1.66 MG/DL (ref 0.66–1.25)
CREAT SERPL-MCNC: 1.69 MG/DL (ref 0.66–1.25)
CREAT SERPL-MCNC: 1.79 MG/DL (ref 0.66–1.25)
ERYTHROCYTE [DISTWIDTH] IN BLOOD BY AUTOMATED COUNT: 16.1 % (ref 10–15)
ERYTHROCYTE [DISTWIDTH] IN BLOOD BY AUTOMATED COUNT: 16.8 % (ref 10–15)
ERYTHROCYTE [DISTWIDTH] IN BLOOD BY AUTOMATED COUNT: 16.8 % (ref 10–15)
FIBRINOGEN PPP-MCNC: 527 MG/DL (ref 200–420)
GFR SERPL CREATININE-BSD FRML MDRD: 41 ML/MIN/{1.73_M2}
GFR SERPL CREATININE-BSD FRML MDRD: 44 ML/MIN/{1.73_M2}
GFR SERPL CREATININE-BSD FRML MDRD: 45 ML/MIN/{1.73_M2}
GLUCOSE BLDC GLUCOMTR-MCNC: 129 MG/DL (ref 70–99)
GLUCOSE BLDC GLUCOMTR-MCNC: 130 MG/DL (ref 70–99)
GLUCOSE BLDC GLUCOMTR-MCNC: 133 MG/DL (ref 70–99)
GLUCOSE BLDC GLUCOMTR-MCNC: 134 MG/DL (ref 70–99)
GLUCOSE BLDC GLUCOMTR-MCNC: 136 MG/DL (ref 70–99)
GLUCOSE BLDC GLUCOMTR-MCNC: 140 MG/DL (ref 70–99)
GLUCOSE BLDC GLUCOMTR-MCNC: 140 MG/DL (ref 70–99)
GLUCOSE BLDC GLUCOMTR-MCNC: 141 MG/DL (ref 70–99)
GLUCOSE BLDC GLUCOMTR-MCNC: 142 MG/DL (ref 70–99)
GLUCOSE BLDC GLUCOMTR-MCNC: 142 MG/DL (ref 70–99)
GLUCOSE BLDC GLUCOMTR-MCNC: 147 MG/DL (ref 70–99)
GLUCOSE BLDC GLUCOMTR-MCNC: 152 MG/DL (ref 70–99)
GLUCOSE BLDC GLUCOMTR-MCNC: 160 MG/DL (ref 70–99)
GLUCOSE BLDC GLUCOMTR-MCNC: 166 MG/DL (ref 70–99)
GLUCOSE BLDC GLUCOMTR-MCNC: 177 MG/DL (ref 70–99)
GLUCOSE BLDC GLUCOMTR-MCNC: 177 MG/DL (ref 70–99)
GLUCOSE BLDC GLUCOMTR-MCNC: 201 MG/DL (ref 70–99)
GLUCOSE BLDC GLUCOMTR-MCNC: 94 MG/DL (ref 70–99)
GLUCOSE SERPL-MCNC: 121 MG/DL (ref 70–99)
GLUCOSE SERPL-MCNC: 152 MG/DL (ref 70–99)
GLUCOSE SERPL-MCNC: 174 MG/DL (ref 70–99)
HCO3 BLD-SCNC: 23 MMOL/L (ref 21–28)
HCO3 BLD-SCNC: 24 MMOL/L (ref 21–28)
HCO3 BLD-SCNC: 26 MMOL/L (ref 21–28)
HCO3 BLDV-SCNC: 22 MMOL/L (ref 21–28)
HCO3 BLDV-SCNC: 24 MMOL/L (ref 21–28)
HCO3 BLDV-SCNC: 26 MMOL/L (ref 21–28)
HCT VFR BLD AUTO: 25.4 % (ref 40–53)
HCT VFR BLD AUTO: 27.1 % (ref 40–53)
HCT VFR BLD AUTO: 28.2 % (ref 40–53)
HGB BLD-MCNC: 8 G/DL (ref 13.3–17.7)
HGB BLD-MCNC: 8.5 G/DL (ref 13.3–17.7)
HGB BLD-MCNC: 8.8 G/DL (ref 13.3–17.7)
INR PPP: 1.5 (ref 0.86–1.14)
INTERPRETATION ECG - MUSE: NORMAL
INTERPRETATION ECG - MUSE: NORMAL
LACTATE BLD-SCNC: 1 MMOL/L (ref 0.7–2)
LACTATE BLD-SCNC: 1.3 MMOL/L (ref 0.7–2)
LACTATE BLD-SCNC: 3 MMOL/L (ref 0.7–2)
LDH SERPL L TO P-CCNC: 443 U/L (ref 85–227)
MAGNESIUM SERPL-MCNC: 2.6 MG/DL (ref 1.6–2.3)
MCH RBC QN AUTO: 27.7 PG (ref 26.5–33)
MCH RBC QN AUTO: 27.8 PG (ref 26.5–33)
MCH RBC QN AUTO: 27.9 PG (ref 26.5–33)
MCHC RBC AUTO-ENTMCNC: 31.2 G/DL (ref 31.5–36.5)
MCHC RBC AUTO-ENTMCNC: 31.4 G/DL (ref 31.5–36.5)
MCHC RBC AUTO-ENTMCNC: 31.5 G/DL (ref 31.5–36.5)
MCV RBC AUTO: 89 FL (ref 78–100)
MRSA DNA SPEC QL NAA+PROBE: NEGATIVE
NUM BPU REQUESTED: 2
O2/TOTAL GAS SETTING VFR VENT: 100 %
O2/TOTAL GAS SETTING VFR VENT: 100 %
O2/TOTAL GAS SETTING VFR VENT: 35 %
O2/TOTAL GAS SETTING VFR VENT: ABNORMAL %
OXYHGB MFR BLD: 95 % (ref 92–100)
OXYHGB MFR BLD: 96 % (ref 92–100)
OXYHGB MFR BLD: 97 % (ref 92–100)
OXYHGB MFR BLDV: 51 %
OXYHGB MFR BLDV: 52 %
OXYHGB MFR BLDV: 58 %
PCO2 BLD: 36 MM HG (ref 35–45)
PCO2 BLD: 36 MM HG (ref 35–45)
PCO2 BLD: 41 MM HG (ref 35–45)
PCO2 BLD: 41 MM HG (ref 35–45)
PCO2 BLD: 45 MM HG (ref 35–45)
PCO2 BLDV: 41 MM HG (ref 40–50)
PCO2 BLDV: 45 MM HG (ref 40–50)
PCO2 BLDV: 56 MM HG (ref 40–50)
PH BLD: 7.36 PH (ref 7.35–7.45)
PH BLD: 7.38 PH (ref 7.35–7.45)
PH BLD: 7.38 PH (ref 7.35–7.45)
PH BLD: 7.43 PH (ref 7.35–7.45)
PH BLD: 7.44 PH (ref 7.35–7.45)
PH BLDV: 7.27 PH (ref 7.32–7.43)
PH BLDV: 7.34 PH (ref 7.32–7.43)
PH BLDV: 7.34 PH (ref 7.32–7.43)
PHOSPHATE SERPL-MCNC: 5.3 MG/DL (ref 2.5–4.5)
PLATELET # BLD AUTO: 104 10E9/L (ref 150–450)
PLATELET # BLD AUTO: 107 10E9/L (ref 150–450)
PLATELET # BLD AUTO: 96 10E9/L (ref 150–450)
PO2 BLD: 107 MM HG (ref 80–105)
PO2 BLD: 115 MM HG (ref 80–105)
PO2 BLD: 127 MM HG (ref 80–105)
PO2 BLD: 142 MM HG (ref 80–105)
PO2 BLD: 283 MM HG (ref 80–105)
PO2 BLDV: 32 MM HG (ref 25–47)
PO2 BLDV: 34 MM HG (ref 25–47)
PO2 BLDV: 35 MM HG (ref 25–47)
POTASSIUM SERPL-SCNC: 4.5 MMOL/L (ref 3.4–5.3)
POTASSIUM SERPL-SCNC: 4.9 MMOL/L (ref 3.4–5.3)
POTASSIUM SERPL-SCNC: 5.2 MMOL/L (ref 3.4–5.3)
PROT SERPL-MCNC: 5.4 G/DL (ref 6.8–8.8)
PROT SERPL-MCNC: 5.6 G/DL (ref 6.8–8.8)
PROT SERPL-MCNC: 5.7 G/DL (ref 6.8–8.8)
RBC # BLD AUTO: 2.87 10E12/L (ref 4.4–5.9)
RBC # BLD AUTO: 3.06 10E12/L (ref 4.4–5.9)
RBC # BLD AUTO: 3.18 10E12/L (ref 4.4–5.9)
SODIUM SERPL-SCNC: 141 MMOL/L (ref 133–144)
SODIUM SERPL-SCNC: 141 MMOL/L (ref 133–144)
SODIUM SERPL-SCNC: 142 MMOL/L (ref 133–144)
SPECIMEN EXP DATE BLD: NORMAL
SPECIMEN SOURCE: NORMAL
TRANSFUSION STATUS PATIENT QL: NORMAL
WBC # BLD AUTO: 10.2 10E9/L (ref 4–11)
WBC # BLD AUTO: 6.6 10E9/L (ref 4–11)
WBC # BLD AUTO: 6.9 10E9/L (ref 4–11)

## 2019-01-02 PROCEDURE — 25000128 H RX IP 250 OP 636: Performed by: SURGERY

## 2019-01-02 PROCEDURE — 25000128 H RX IP 250 OP 636: Performed by: STUDENT IN AN ORGANIZED HEALTH CARE EDUCATION/TRAINING PROGRAM

## 2019-01-02 PROCEDURE — 25000131 ZZH RX MED GY IP 250 OP 636 PS 637: Performed by: STUDENT IN AN ORGANIZED HEALTH CARE EDUCATION/TRAINING PROGRAM

## 2019-01-02 PROCEDURE — 99291 CRITICAL CARE FIRST HOUR: CPT | Mod: 25 | Performed by: INTERNAL MEDICINE

## 2019-01-02 PROCEDURE — 25000132 ZZH RX MED GY IP 250 OP 250 PS 637: Performed by: THORACIC SURGERY (CARDIOTHORACIC VASCULAR SURGERY)

## 2019-01-02 PROCEDURE — 83735 ASSAY OF MAGNESIUM: CPT | Performed by: SURGERY

## 2019-01-02 PROCEDURE — 94003 VENT MGMT INPAT SUBQ DAY: CPT

## 2019-01-02 PROCEDURE — 83605 ASSAY OF LACTIC ACID: CPT | Performed by: SURGERY

## 2019-01-02 PROCEDURE — 25000128 H RX IP 250 OP 636: Performed by: THORACIC SURGERY (CARDIOTHORACIC VASCULAR SURGERY)

## 2019-01-02 PROCEDURE — 93325 DOPPLER ECHO COLOR FLOW MAPG: CPT | Mod: 26 | Performed by: INTERNAL MEDICINE

## 2019-01-02 PROCEDURE — 25000132 ZZH RX MED GY IP 250 OP 250 PS 637

## 2019-01-02 PROCEDURE — 86900 BLOOD TYPING SEROLOGIC ABO: CPT | Performed by: SURGERY

## 2019-01-02 PROCEDURE — 93325 DOPPLER ECHO COLOR FLOW MAPG: CPT

## 2019-01-02 PROCEDURE — 20000004 ZZH R&B ICU UMMC

## 2019-01-02 PROCEDURE — 83615 LACTATE (LD) (LDH) ENZYME: CPT | Performed by: SURGERY

## 2019-01-02 PROCEDURE — 82805 BLOOD GASES W/O2 SATURATION: CPT | Performed by: SURGERY

## 2019-01-02 PROCEDURE — 86923 COMPATIBILITY TEST ELECTRIC: CPT | Performed by: SURGERY

## 2019-01-02 PROCEDURE — 25000132 ZZH RX MED GY IP 250 OP 250 PS 637: Performed by: SURGERY

## 2019-01-02 PROCEDURE — 86901 BLOOD TYPING SEROLOGIC RH(D): CPT | Performed by: SURGERY

## 2019-01-02 PROCEDURE — 40000275 ZZH STATISTIC RCP TIME EA 10 MIN

## 2019-01-02 PROCEDURE — 84100 ASSAY OF PHOSPHORUS: CPT | Performed by: SURGERY

## 2019-01-02 PROCEDURE — 40000196 ZZH STATISTIC RAPCV CVP MONITORING

## 2019-01-02 PROCEDURE — 80053 COMPREHEN METABOLIC PANEL: CPT | Performed by: SURGERY

## 2019-01-02 PROCEDURE — 40000048 ZZH STATISTIC DAILY SWAN MONITORING

## 2019-01-02 PROCEDURE — 93320 DOPPLER ECHO COMPLETE: CPT | Mod: 26 | Performed by: INTERNAL MEDICINE

## 2019-01-02 PROCEDURE — 25000125 ZZHC RX 250: Performed by: SURGERY

## 2019-01-02 PROCEDURE — 25000125 ZZHC RX 250: Performed by: THORACIC SURGERY (CARDIOTHORACIC VASCULAR SURGERY)

## 2019-01-02 PROCEDURE — 87641 MR-STAPH DNA AMP PROBE: CPT | Performed by: SURGERY

## 2019-01-02 PROCEDURE — 93312 ECHO TRANSESOPHAGEAL: CPT | Mod: 26 | Performed by: INTERNAL MEDICINE

## 2019-01-02 PROCEDURE — C9113 INJ PANTOPRAZOLE SODIUM, VIA: HCPCS | Performed by: THORACIC SURGERY (CARDIOTHORACIC VASCULAR SURGERY)

## 2019-01-02 PROCEDURE — P9016 RBC LEUKOCYTES REDUCED: HCPCS | Performed by: SURGERY

## 2019-01-02 PROCEDURE — 85610 PROTHROMBIN TIME: CPT | Performed by: SURGERY

## 2019-01-02 PROCEDURE — 85730 THROMBOPLASTIN TIME PARTIAL: CPT | Performed by: SURGERY

## 2019-01-02 PROCEDURE — P9041 ALBUMIN (HUMAN),5%, 50ML: HCPCS | Performed by: STUDENT IN AN ORGANIZED HEALTH CARE EDUCATION/TRAINING PROGRAM

## 2019-01-02 PROCEDURE — 86850 RBC ANTIBODY SCREEN: CPT | Performed by: SURGERY

## 2019-01-02 PROCEDURE — 85384 FIBRINOGEN ACTIVITY: CPT | Performed by: SURGERY

## 2019-01-02 PROCEDURE — 40000014 ZZH STATISTIC ARTERIAL MONITORING DAILY

## 2019-01-02 PROCEDURE — 00000146 ZZHCL STATISTIC GLUCOSE BY METER IP

## 2019-01-02 PROCEDURE — 87640 STAPH A DNA AMP PROBE: CPT | Performed by: SURGERY

## 2019-01-02 PROCEDURE — 85027 COMPLETE CBC AUTOMATED: CPT | Performed by: SURGERY

## 2019-01-02 PROCEDURE — 82805 BLOOD GASES W/O2 SATURATION: CPT | Performed by: THORACIC SURGERY (CARDIOTHORACIC VASCULAR SURGERY)

## 2019-01-02 RX ORDER — DOBUTAMINE HYDROCHLORIDE 200 MG/100ML
2.5 INJECTION INTRAVENOUS CONTINUOUS
Status: DISCONTINUED | OUTPATIENT
Start: 2019-01-02 | End: 2019-01-08

## 2019-01-02 RX ORDER — DEXTROSE MONOHYDRATE 25 G/50ML
25-50 INJECTION, SOLUTION INTRAVENOUS
Status: DISCONTINUED | OUTPATIENT
Start: 2019-01-02 | End: 2019-01-09

## 2019-01-02 RX ORDER — NICOTINE POLACRILEX 4 MG
15-30 LOZENGE BUCCAL
Status: DISCONTINUED | OUTPATIENT
Start: 2019-01-02 | End: 2019-01-11 | Stop reason: HOSPADM

## 2019-01-02 RX ORDER — ALBUMIN, HUMAN INJ 5% 5 %
12.5 SOLUTION INTRAVENOUS ONCE
Status: COMPLETED | OUTPATIENT
Start: 2019-01-02 | End: 2019-01-02

## 2019-01-02 RX ADMIN — HUMAN INSULIN 3 UNITS/HR: 100 INJECTION, SOLUTION SUBCUTANEOUS at 04:38

## 2019-01-02 RX ADMIN — MUPIROCIN 1 G: 20 OINTMENT TOPICAL at 19:58

## 2019-01-02 RX ADMIN — AMIODARONE HYDROCHLORIDE 200 MG: 200 TABLET ORAL at 07:33

## 2019-01-02 RX ADMIN — GABAPENTIN 100 MG: 100 CAPSULE ORAL at 07:33

## 2019-01-02 RX ADMIN — ACETAMINOPHEN 975 MG: 325 TABLET, FILM COATED ORAL at 15:36

## 2019-01-02 RX ADMIN — ACETAMINOPHEN 975 MG: 325 TABLET, FILM COATED ORAL at 07:33

## 2019-01-02 RX ADMIN — GABAPENTIN 100 MG: 100 CAPSULE ORAL at 14:33

## 2019-01-02 RX ADMIN — DIGOXIN 125 MCG: 125 TABLET ORAL at 07:33

## 2019-01-02 RX ADMIN — PANTOPRAZOLE SODIUM 40 MG: 40 INJECTION, POWDER, FOR SOLUTION INTRAVENOUS at 07:33

## 2019-01-02 RX ADMIN — DOBUTAMINE HYDROCHLORIDE 2.5 MCG/KG/MIN: 200 INJECTION INTRAVENOUS at 11:32

## 2019-01-02 RX ADMIN — GABAPENTIN 100 MG: 100 CAPSULE ORAL at 19:44

## 2019-01-02 RX ADMIN — LIDOCAINE 2 PATCH: 560 PATCH PERCUTANEOUS; TOPICAL; TRANSDERMAL at 07:35

## 2019-01-02 RX ADMIN — ACETAMINOPHEN 975 MG: 325 TABLET, FILM COATED ORAL at 23:05

## 2019-01-02 RX ADMIN — ASPIRIN 81 MG 81 MG: 81 TABLET ORAL at 07:34

## 2019-01-02 RX ADMIN — SENNOSIDES AND DOCUSATE SODIUM 1 TABLET: 8.6; 5 TABLET ORAL at 19:44

## 2019-01-02 RX ADMIN — MUPIROCIN 1 G: 20 OINTMENT TOPICAL at 07:36

## 2019-01-02 RX ADMIN — SENNOSIDES AND DOCUSATE SODIUM 1 TABLET: 8.6; 5 TABLET ORAL at 07:34

## 2019-01-02 RX ADMIN — POLYETHYLENE GLYCOL 3350 17 G: 17 POWDER, FOR SOLUTION ORAL at 07:32

## 2019-01-02 RX ADMIN — PROPOFOL 20 MCG/KG/MIN: 10 INJECTION, EMULSION INTRAVENOUS at 11:47

## 2019-01-02 RX ADMIN — POLYETHYLENE GLYCOL 3350 17 G: 17 POWDER, FOR SOLUTION ORAL at 19:45

## 2019-01-02 RX ADMIN — INSULIN ASPART 1 UNITS: 100 INJECTION, SOLUTION INTRAVENOUS; SUBCUTANEOUS at 17:26

## 2019-01-02 RX ADMIN — PROPOFOL 35 MCG/KG/MIN: 10 INJECTION, EMULSION INTRAVENOUS at 04:12

## 2019-01-02 RX ADMIN — ALBUMIN HUMAN 12.5 G: 0.05 INJECTION, SOLUTION INTRAVENOUS at 09:43

## 2019-01-02 RX ADMIN — DEXMEDETOMIDINE HYDROCHLORIDE 0.4 MCG/KG/HR: 4 INJECTION, SOLUTION INTRAVENOUS at 04:09

## 2019-01-02 ASSESSMENT — ACTIVITIES OF DAILY LIVING (ADL)
ADLS_ACUITY_SCORE: 15

## 2019-01-02 ASSESSMENT — MIFFLIN-ST. JEOR: SCORE: 1581

## 2019-01-02 NOTE — PROGRESS NOTES
"CLINICAL NUTRITION SERVICES - ASSESSMENT NOTE     Nutrition Prescription    RECOMMENDATIONS FOR MDs/PROVIDERS TO ORDER:  If unable to extubate within next 24 hours, rec XR Feeding Tube Placement for placement of FT under fluoro (d/t LVAD) and start TFs  Once able to extubate, ADAT as medically able to regular diet.    Malnutrition Status:    Patient does not meet two of the above criteria necessary for diagnosing malnutrition    Future/Additional Recommendations:  If pt needs TFs pending extubation plans, recommend begin Nepro @ 10 mL/hr and advance by 10 mL q8h to goal 40 ml/hr (960 ml/day) to provide 1728 kcals, 78 g PRO, 701 ml free H2O, 155 g CHO and 12 g Fiber daily.                           -Order 1 pkt TID ProSource TF (120 kcal, 33 g PRO) for total 1848 kcal (29 kcal/kg) and 111 g PRO (1.8 g/kg)    -Order free water flushes 30 mL q4h for patency.    -Order Certavite to ensure adequate micronutrients in case of slow TF adv, EN interruptions, hypermetabolic needs       REASON FOR ASSESSMENT  Mariusz Sharp is a/an 58 year old male assessed by the dietitian for Post-op LVAD    NUTRITION HISTORY  Pt seen in the past by RD for pre-LVAD education - July 2018.   Pt intubated/sedated s/p LVAD on 12/31.     CURRENT NUTRITION ORDERS  Diet: NPO  Intake/Tolerance: no TF yet started. Pt was eating 100% of meals per RN flowsheet prior to surgery.     LABS  Labs reviewed  K+ 5.2  Phos 5.3    MEDICATIONS  Medications reviewed    ANTHROPOMETRICS  Height: 162.6 cm (5' 4\")  Most Recent Weight: 84 kg (185 lb 3 oz)    IBW: 59.1 kg  BMI: Overweight BMI 25-29.9 (28.5 kg/m^2)  Weight History: Lowest wt this admit 75.6 kg on 12/31. Wts variable below likely 2/2 fluid status   Wt Readings from Last 10 Encounters:   01/02/19 84 kg (185 lb 3 oz)   12/07/18 79 kg (174 lb 2.6 oz)   11/13/18 79.4 kg (175 lb 0.7 oz)   11/13/18 79.4 kg (175 lb)   10/17/18 78.5 kg (173 lb)   10/03/18 75.6 kg (166 lb 11.2 oz)   09/12/18 76.9 kg (169 lb 8 oz) "   08/29/18 77.2 kg (170 lb 3.2 oz)   08/06/18 75.3 kg (166 lb 1.6 oz)   07/24/18 77 kg (169 lb 12.8 oz)     Dosing Weight: 63 kg (adjusted from 75.6 kg)    ASSESSED NUTRITION NEEDS  Estimated Energy Needs: 1575 - 1890 kcals/day (25 - 30 kcals/kg)  Justification: Maintenance  Estimated Protein Needs: 95 - 126 grams protein/day (1.5 - 2 grams of pro/kg)  Justification: Post-LVAD  Estimated Fluid Needs: 1 mL/kcal  Justification: Maintenance    PHYSICAL FINDINGS  See malnutrition section below    MALNUTRITION  % Intake: Decreased intake does not meet criteria  % Weight Loss: None noted  Subcutaneous Fat Loss: None observed  Muscle Loss: None observed  Fluid Accumulation/Edema: None noted  Malnutrition Diagnosis: Patient does not meet two of the above criteria necessary for diagnosing malnutrition    NUTRITION DIAGNOSIS  Inadequate protein-energy intake related to NPO on vent without nutrition support as evidenced by NPO x 48 hours with no plans for nutrition support at this time (pending extubation)    INTERVENTIONS  Implementation  Nutrition Education: Not appropriate at this time due to patient condition   Collaboration with other providers - ICU rounds, possible attempt at weaning vent today. FT placement tomorrow if unable to extubate    Goals  Diet adv v nutrition support within 24 hours     Monitoring/Evaluation  Progress toward goals will be monitored and evaluated per protocol.    Sherlyn Dejesus RD, LD, [facility]  CVICU Dietitian  Pager: [phone]

## 2019-01-02 NOTE — PLAN OF CARE
Sedation holiday completed x 2. First trial patient was very agitated. LVAD low flow alarm. MAPs in 50s. CV intensivist team in room. Sedation immediately restarted. Second sedation holiday trial patient was calm and cooperative. Witting board utilized. Follows all commands. PERRL 2mm. Sedation restarted r/t plan for SELVIN in Am tomorrow. SR with rates in 60s. No ectopy noted. BPs stable (besides low BPs with low flow alarms.) See flowsheets for VS and hemodynamics. CV intensivist team and Cards 2 team frequently updated throughout the day with writers concerns regarding patient.     2 separate LVAD low flow alarms. First alarm patient was on sedation holiday, very agitated and started coughing. MAPs in 100s prior to low flow alarm sounding. During low flow alarm. PIs dropped to mid 1s, MAPs dropped into the 50s HR stable in 60s. Sedation immediately restarted. Pressors increased as needed. MD in room during alarm. Second low flow alarm, patient was sedated, HOB at 45 degrees and low flow alarm sounded. PIs in dropped to mid 1s, MAPs dropped to 50, Flows 1.2-1.5. Pt placed in trendelenburg position. Pressors titrated as indicated. Nedra VAD coordinator contacted x 2 thought the day. LVAD flows have been 2.4-3.6, Speed changed in AM per Dr. Baker to 5200, then decreased by Cards 2 fellow to 5100. Cards 2 and CV intensivist team notified frequently of low/fluctuating PI issues and flows below 3. Bedside Echo inconclusive.  750 Albumin given. 1 unit PRBCs given for Hgb 7.2. . PIs very positional HOB changes. PIs steadily in 1s, after 2 low flow alarm events. Both Cards 2 team and intensivist team notified frequently. Plan for SELVIN tomorrow in AM.     Pt switched to SIMV per CV intensivist team in afternoon. Pt was breathing at set low rate of 10. All sedation turned off. Pt calm and cooperative. Switched to PS with 4-5 apneic alarms until patient awake and writing on communication board. Gas drawn in attempt to extubate.  Plan then changed around 1800 to keep patient sedated for SELVIN tomorrow AM. Education/Update provided to patient and family. Voiced understanding. OG to suction. UOP greater than 30cc an hour. See flowsheets for insulin titration.

## 2019-01-02 NOTE — OP NOTE
Procedure Date: 12/31/2018      PREOPERATIVE DIAGNOSES:   1.  Cardiogenic shock, status post ischemic cardiomyopathy.   2.  Coronary artery disease, status post myocardial infarction.      POSTOPERATIVE DIAGNOSES:   1.  Cardiogenic shock, status post ischemic cardiomyopathy.   2.  Coronary artery disease, status post myocardial infarction.      PROCEDURE:  Placement of HeartMate 3 left ventricular assist device (intracorporeal left ventricular assist device).      SURGEON:  Kamaljit Baker MD      ASSISTANT:  Galen Lane MD      OPERATIVE INDICATIONS:  The patient is a 58-year-old gentleman with a past medical history of coronary artery disease, status post myocardial infarction, now with ischemic cardiomyopathy with congestive heart failure.  Decision was made to proceed with LVAD placement.  I discussed risks, benefits of surgery with the patient and the family and risk of death, stroke, ***.  They understood and wished to proceed with surgery.      OPERATIVE FINDINGS:  The patient's sternum was of adequate quality.  The pericardial space had moderate adhesions, probably secondary to his myocardial infarction.  There was no aortic regurgitation.  There was no patent foramen ovale.      DESCRIPTION OF PROCEDURE:  The patient was brought to operating room in stable condition.  Following the administration of general anesthesia, the patient's chest, abdomen and lower extremities prepped and draped in the usual manner.  Median sternotomy was performed.  The adhesions were carefully dissected free to expose the aorta and the right atrium.  Preparation of cardiopulmonary bypass included ACT-guided heparinization and the admission of Amicar.  The aorta was cannulated with a 20-Yi arterial cannula via 3-0 Tevdek pursestring pledgeted suture x 2.  Dual stage venous cannula was inserted right atrium.  Full cardiopulmonary bypass was initiated.  The left *** apex was exposed.  Inflow ring was sewn onto the anterolateral  surface of the heart in the usual sterile fashion.  A coring knife was used to core a portion of the apex of the left ventricle.  Inflow cannula was inserted in the heart along with the pump and secured adequately.  Driveline was tunneled out to right upper quadrant incision.  Outflow graft was measured and sewn to a longitudinal aortotomy using continuous 4-0 Prolene sutures.  De-airing maneuvers were performed in the usual standard fashion.  Following confirmation of de-airing *** the patient gradually weaned off cardiopulmonary bypass and LVAD pump was initiated.  Initial hemodynamics were stable.  Echo showed mild to moderate LV decompression, mild to moderate RV dysfunction and no patent foramen ovale.  Protamine was given.  All cannulas removed.  Careful hemostasis obtained.  An 18 mm smoothed outflow graft was used to cover the exposed portion of the outflow graft as well as the intrathoracic portion of the driveline.  Two anterior mediastinal chest tubes, left pleural chest tube, and a posterior Juanpablo drain was placed.  Sternum was closed with surgical steel wires.  Fascia, subcutaneous tissue, skin of chest were closed in layers.  The patient transferred to ICU in stable critical condition.         CONSTANTIN CARTER MD             D: 2019   T: 2019   MT: SAVI      Name:     ADILSON RINCON   MRN:      [id number]        Account:        [id number]   :      1960           Procedure Date: 2018      Document: [id number]       cc: Jenni Nielson MD

## 2019-01-02 NOTE — OP NOTE
Procedure Date: 12/31/2018      PREOPERATIVE DIAGNOSES:   1.  Cardiogenic shock, status post ischemic cardiomyopathy.   2.  Coronary artery disease, status post myocardial infarction.      POSTOPERATIVE DIAGNOSES:   1.  Cardiogenic shock, status post ischemic cardiomyopathy.   2.  Coronary artery disease, status post myocardial infarction.      PROCEDURE:  Placement of HeartMate 3 left ventricular assist device (intracorporeal left ventricular assist device).      SURGEON:  Kamaljit Baker MD      ASSISTANT:  Galen Lane MD      OPERATIVE INDICATIONS:  The patient is a 58-year-old gentleman with a past medical history of coronary artery disease, status post myocardial infarction, now with ischemic cardiomyopathy with congestive heart failure.  Decision was made to proceed with LVAD placement.  I discussed risks, benefits of surgery with the patient and the family and risk of death, stroke, bleeding, infection and renal failure.  They understood and wished to proceed with surgery.      OPERATIVE FINDINGS:  The patient's sternum was of adequate quality.  The pericardial space had moderate adhesions, probably secondary to his myocardial infarction.  There was no aortic regurgitation.  There was no patent foramen ovale.      DESCRIPTION OF PROCEDURE:  The patient was brought to operating room in stable condition.  Following the administration of general anesthesia, the patient's chest, abdomen and lower extremities prepped and draped in the usual manner.  Median sternotomy was performed.  The adhesions were carefully dissected free to expose the aorta and the right atrium.  Preparation of cardiopulmonary bypass included ACT-guided heparinization and the admission of Amicar.  The aorta was cannulated with a 20-[place]hal[place] arterial cannula via 3-0 Tevdek pursestring pledgeted suture x 2.  Dual stage venous cannula was inserted right atrium.  Full cardiopulmonary bypass was initiated.  The left ventricular apex was exposed.   Inflow ring was sewn onto the anterolateral surface of the heart in the usual sterile fashion.  A coring knife was used to core a portion of the apex of the left ventricle.  Inflow cannula was inserted in the heart along with the pump and secured adequately.  Driveline was tunneled out to right upper quadrant incision.  Outflow graft was measured and sewn to a longitudinal aortotomy using continuous 4-0 Prolene sutures.  De-airing maneuvers were performed in the usual standard fashion.  Following confirmation of de-airing by echo,  the patient gradually weaned off cardiopulmonary bypass and LVAD pump was initiated.  Initial hemodynamics were stable.  Echo showed mild to moderate LV decompression, mild to moderate RV dysfunction and no patent foramen ovale.  Protamine was given.  All cannulas removed.  Careful hemostasis obtained.  An 18 mm smoothed outflow graft was used to cover the exposed portion of the outflow graft as well as the intrathoracic portion of the driveline.  Two anterior mediastinal chest tubes, left pleural chest tube, and a posterior Juanpablo drain was placed.  Sternum was closed with surgical steel wires.  Fascia, subcutaneous tissue, skin of chest were closed in layers.  The patient transferred to ICU in stable critical condition.            CONSTANTIN CARTER MD             D: 2019   T: 2019   MT: SAVI      Name:     ADILSON RINCON   MRN:      [id number]        Account:        [id number]   :      1960           Procedure Date: 2018      Document: [id number]       cc: Jenni Nielson MD

## 2019-01-02 NOTE — PROGRESS NOTES
ADVANCED HEART FAILURE PROGRESS NOTE    SUBJECTIVE:  This morning patient had an acute drop in his LVAD flows to ~1.5 with PI near 10. At the same time his MAP drop to 30. He was given an amp of epinepherine with resolution of his hypotension and low flows. SELVIN done which shows LV unloaded with good inflow cannula position as well as moderate RV dysfunction. Patient extubated later in the morning and doing well.    ROS otherwise negative.    OBJECTIVE:  Vital signs:  Temp: 99.3  F (37.4  C) Temp  Min: 98.1  F (36.7  C)  Max: 99.3  F (37.4  C)  Heart Rate: 64 Heart Rate  Min: 56  Max: 75    No data recorded.  No data recorded.    Resp: 10 Resp  Min: 10  Max: 28  SpO2: 100 % SpO2  Min: 95 %  Max: 100 %    HM3: 5100 RPM, 3.7 LPM, PI 2.5-3.5 (intermittently down to 1.5 and up to 10)     [place]: CVP 10, PA 28/12, PCW 9, PA sat 57%, CO/CI 4.2/2.1      Intake/Output Summary (Last 24 hours) at 1/2/2019 0635  Last data filed at 1/2/2019 0600  Gross per 24 hour   Intake 3082.3 ml   Output 1750 ml   Net 1332.3 ml       Vitals:    12/31/18 0431 01/01/19 0345 01/02/19 0330   Weight: 75.6 kg (166 lb 10.7 oz) 83.2 kg (183 lb 6.8 oz) 84 kg (185 lb 3 oz)       Physical Exam:  Gen: intubated, sedated  HEENT: ETT in place  Resp: bilateral ventilated breath sounds  CVS: normal rate, VAD hum, no JVD  Abdo: soft, nontender  Extremities: warm, no edema   Neuro: sedated, awakens and follow commands      Medications:    dexmedetomidine 0.4 mcg/kg/hr (01/02/19 0600)     IV fluid REPLACEMENT ONLY       EPINEPHrine IV infusion ADULT 0.03 mcg/kg/min (01/02/19 0600)     fentaNYL 50 mcg/hr (01/02/19 0600)     heparin 500 Units/hr (01/02/19 0600)     insulin (regular) 2 Units/hr (01/02/19 0600)     - MEDICATION INSTRUCTIONS -       propofol (DIPRIVAN) infusion 20 mcg/kg/min (01/02/19 0612)     BETA BLOCKER NOT PRESCRIBED       vasopressin (PITRESSIN) infusion ADULT (40 mL) Stopped (01/01/19 1000)       Current Facility-Administered Medications    Medication Dose Route Frequency     acetaminophen  975 mg Oral Q8H     amiodarone  200 mg Oral Daily     aspirin  81 mg Oral Daily     digoxin  125 mcg Oral Daily     gabapentin  100 mg Oral TID     influenza vaccine adult (product based on age)  0.5 mL Intramuscular Prior to discharge     lidocaine  2 patch Transdermal Q24H     lidocaine   Transdermal Q8H     lidocaine   Transdermal Q24h     mupirocin  1 g Both Nostrils BID     pantoprazole (PROTONIX) IV  40 mg Intravenous Daily     polyethylene glycol  17 g Oral BID     rosuvastatin  20 mg Oral At Bedtime     senna-docusate  1 tablet Oral BID    Or     senna-docusate  2 tablet Oral BID     sodium chloride (PF)  10 mL Intravenous Once     sodium chloride (PF)  3 mL Intracatheter Q8H     sodium chloride (PF)  3 mL Intracatheter Q8H     vancomycin (VANCOCIN) IV  1,000 mg Intravenous Q12H         Labs:  CMP  Recent Labs   Lab 01/02/19 0339 01/01/19  2151  12/30/18 2007    144   < > 137   POTASSIUM 4.9 5.0   < > 3.6   CHLORIDE 110* 114*   < > 102   CO2 25 24   < > 28   BUN 20 18   < > 40*   CR 1.66* 1.42*   < > 1.67*   ARLENE 7.8* 7.2*   < > 8.5   MAG 2.6* 2.3   < >  --    PHOS 5.3* 5.3*   < >  --    * 159*   < > 280*   LDH  --   --   --  145   ALKPHOS 41 31*   < > 43   BILITOTAL 2.4* 2.1*   < > 0.7   * 26   < > 20   * 17   < > 31    < > = values in this interval not displayed.     BLOOD GAS  Recent Labs   Lab 01/02/19 0339 01/01/19  2357   PH 7.38 7.36   PCO2 45 41   PO2 127* 142*     CBC  Recent Labs   Lab 01/02/19 0339 01/01/19  2151   WBC 6.9 7.5   HGB 8.0* 8.2*   HCT 25.4* 25.7*   * 98*     COAG  Recent Labs   Lab 01/02/19 0339 01/01/19  0941 01/01/19  0343 12/31/18  1302   INR  --  1.39* 1.46* 1.42*   PTT 55*  --   --  40*         ASSESSMENT/PLAN:  58 year old male with a history of ICM, CAD (s/p STEMI with ALYSSA to LAD 6/27/18), monomorphic VT, LV thrombus, CKD Stage III, paroxysmal AF, DM Type II who was  admitted for placement of LVAD. Now s/p HM3 LVAD 12/31.     HeartMate III LVAD placed 12/31:  - speed 5100 RPM  - on ASA 81  - on fixed rate heparin  - switched epi to dobutamine for RV support  - SELVIN today showed moderate-severe RV dysfunction, LV unloaded with inflow cannula in good position     Ischemic cardiomyopathy, CAD:  - ALYSSA to LAD 6/27/2018  - was not on ACE-I due to CKD  - holding BB after LVAD  - continue rosuvastatin  - was on plavix, holding now after surgery     Paroxysmal afib:  - currently in sinus rhythm  - holding BB after LVAD  - starting heparin drip     CKD  - stable with good urine output        Seen and staffed with Dr. Miroslava Romo MD  Advanced Heart Failure Fellow  [phone]

## 2019-01-02 NOTE — PROGRESS NOTES
Event Note:  Pt laid flat and SWAN values obtained then turned to right side, following turn pt became agitated and coughing against ventilator.  At this time LVAD flow dropped to as low at 1.4 lpm and MAP dropped to 30's and HR to 40's.  MD's called to bedside.  Crash cart obtained, 1 mg Epi given and patches applied.  Albumin 250 ml given per Dr. Sexton.  Following Epi, SBP in 200's with flow still below 2 and rhythm in frequent runs of VT.  As MAP slowly decreased LVAD flow returned to 3's and VT subsided.  Will continue to monitor and updated team as needed.

## 2019-01-02 NOTE — PLAN OF CARE
Neuro - Tmax 37.5c, pupils 2+ equal/reactive, Propofol 20-40, Precedex 0.4, Fentanyl 50, Pt moves all extremities, follows commands.  Cardiac - SB/SR 50-70, MAPs greater than 65, Epi 0.03, Heparin gtt straight rate 500 unit(s)/hr, 2X Med, 1X pleural, 1X pericardial CT with 10-20ml/hr. See flowsheet for [place] numbers. PI's 1.5-7, When pt coughed PI's went to 1.5 for 10 minutes before slowly recovering. PI's drop to 2 when pt on right side.  Respiratory - CMV, RR 10 Fio2  35%, PEEP 5, Tidal Volume 500, Coarse/diminished lung sounds, Scant secretions.  GI - Hypoactive bowel sounds. Insulin gtt 0-4 u/hr   - Perez with 30-80ml/hr.  Plan - SELVIN today, possible extubate after. Continue to monitor and notify MD of questions or concerns.

## 2019-01-02 NOTE — PROGRESS NOTES
CVTS PROGRESS NOTE  January 2, 2019      CO-MORBIDITIES:   Chronic systolic heart failure (H)  (primary encounter diagnosis)  STEMI  ICD  DM     ASSESSMENT: Mariusz Sharp is a 58 year old male now s/p LVAD (HM 3) placement with Dr. Baker on 12/31/2018 for a history of ischemic cardiomyopathy with reduced ejection fraction. There were no major intraoperative events to report and the patient was able to be liberated from cardiopulmonary bypass without significant difficulty. On POD 1      TODAY'S PROGRESS:   - 1u RBCs per surgery  - SELVIN today with cardiology  - reassess weaning from ventilator pending SELVIN resutls      - dobutamine to optimize RV function, wean off of epi      - consider CT chest if outflow obstruction is thought to be anatomic, would prefer to avoid contrast given renal insufficiency  - wean to extubate on precedex      PLAN:  Neuro/pain/sedation:  - Monitor neurological status. Notify the MD for any acute changes in exam.  - Fentanyl gtt for pain.  - Propofol/Precedex gtt for sedation. Plan to extubate on precedex.  - Tylenol scheduled. Oxy/dil PRN. Robaxin PRN.   - Lidoderm     Pulmonary:   - Supplemental oxygen to keep saturation above 92 %.  - Incentive spirometer every 15- 30 minutes when awake.  - Mechanically ventilated, PST. Extubate on precedex     Cardiovascular:  #LVAD  - 2x low flow, high PI events, SELVIN 1/2/19  - Monitor hemodynamic status.   - Epinephrine wean off, start doubtamine  - MAP > 65.  - PTA amiodarone and digoxin. Will discuss with cards.  - Holding metoplolol     GI care:   - Miralax/senna-colace, suppository PRN     Fluids/ Electrolytes/ Nutrition:   - TKO  - NPO. ADAT once extubated.  - No indication for parenteral nutrition.    Renal:    - Urine output is adequate so far.  - Will continue to monitor intake and output.  - keep Perez  - Goal even     Endocrine:    - Insulin gtt     ID/ Antibiotics:  - Periop antibiotics.     Heme:     - Hemoglobin 7.2, give 1u RBC  -  Anticoagulation: possible heparin 500 straight rate this afternoon     Prophylaxis:    - Mechanical prophylaxis for DVT.   - heparin gtt  - PPI     Lines/tubes/drains:  - right IV CVC with [place], arterial line, pivs,  Perez, ett.   - Chest tubes: med x2, left pleural, pericardial tee     Disposition:  -  CV ICU     Asia Portillo MD  General Surgery, PGY-3  Pg [phone]    ====================================    TODAY'S PROGRESS:   SUBJECTIVE:   - stable overnight but had a low flow event this morning requiring epi 1mg with responsiveness.        OBJECTIVE:   1. VITAL SIGNS:   Temp:  [98.1  F (36.7  C)-99.7  F (37.6  C)] 99.7  F (37.6  C)  Heart Rate:  [56-71] 67  Resp:  [10-28] 10  MAP:  [67 mmHg-94 mmHg] 75 mmHg  Arterial Line BP: ()/(56-79) 80/67  FiO2 (%):  [30 %-35 %] 35 %  SpO2:  [95 %-100 %] 99 %  Ventilation Mode: CMV/AC  (Continuous Mandatory Ventilation/ Assist Control)  FiO2 (%): 35 %  Rate Set (breaths/minute): 10 breaths/min  Tidal Volume Set (mL): 500 mL  PEEP (cm H2O): 5 cmH2O  Pressure Support (cm H2O): 10 cmH2O  Oxygen Concentration (%): 30 %  Resp: 10      2. INTAKE/ OUTPUT:   I/O last 3 completed shifts:  In: 3082.3 [I.V.:1952.3; NG/GT:330]  Out: 1750 [Urine:1255; Emesis/NG output:75; Chest Tube:420]    3. PHYSICAL EXAMINATION:   General:   Neuro: sedated  Resp: Breathing non-labored on ventilator   CV: RRR, sinus 80s   Abdomen: Soft, Non-distended, Non-tender  Incisions: c/d/i  Extremities: warm and well perfused, trace edema     4. INVESTIGATIONS:   Arterial Blood Gases   Recent Labs   Lab 01/02/19  0812 01/02/19  0339 01/01/19  3317 01/01/19  2151   PH 7.38 7.38 7.36 7.37   PCO2 41 45 41 44   PO2 283* 127* 142* 87   HCO3 24 26 23 25     Complete Blood Count   Recent Labs   Lab 01/02/19  0812 01/02/19 0339 01/01/19  2151 01/01/19  1547   WBC 10.2 6.9 7.5 6.7   HGB 8.5* 8.0* 8.2* 8.2*   * 104* 98* 102*     Basic Metabolic Panel  Recent Labs   Lab 01/02/19 0812 01/02/19  0339  01/01/19  2151 01/01/19  1547    141 144 146*   POTASSIUM 5.2 4.9 5.0 3.6   CHLORIDE 111* 110* 114* 114*   CO2 24 25 24 25   BUN 19 20 18 19   CR 1.79* 1.66* 1.42* 1.34*   * 174* 159* 122*     Liver Function Tests  Recent Labs   Lab 01/02/19  0812 01/02/19  0339 01/01/19 2151 01/01/19  1547 01/01/19  0941 01/01/19  0343  12/31/18  1302   * 133* 26 24 35 30   < > 32   * 113* 17 17 19 17   < > 26   ALKPHOS 47 41 31* 30* 31* 26*   < > 33*   BILITOTAL 2.2* 2.4* 2.1* 1.8* 2.2* 1.8*   < > 1.4*   ALBUMIN 3.0* 3.1* 3.0* 2.8* 3.1* 2.9*   < > 2.6*   INR 1.50*  --   --   --  1.39* 1.46*  --  1.42*    < > = values in this interval not displayed.     Pancreatic Enzymes  No lab results found in last 7 days.  Coagulation Profile  Recent Labs   Lab 01/02/19  0812 01/02/19 0339 01/01/19  0941 01/01/19  0343 12/31/18  1302 12/31/18  1030   INR 1.50*  --  1.39* 1.46* 1.42* 1.65*   PTT 48* 55*  --   --  40* 34     Lactate  Invalid input(s): LACTATE    5. RADIOLOGY:   Recent Results (from the past 24 hour(s))   XR Chest Port 1 View    Narrative    Exam: Chest x-ray, 1 view, 1/1/2019.    COMPARISON: Same day, earlier.    HISTORY: Evaluate [place]-Calvin catheter.    FINDINGS: AP view of the chest was obtained. Right IJ Stanley-Calvin  catheter with its tip projecting over the right main pulmonary artery.  Otherwise, stable support devices including left-sided chest wall  implantable cardiac defibrillator, LVAD, endotracheal tube, NG/OG  tube, left chest. Median sternotomy wires. Cardiac mediastinal  silhouette within normal limits. Small bilateral pleural effusions  with overlying atelectasis versus consolidation. No pneumothorax.      Impression    IMPRESSION:  1. Right IJ Stanley-Calvin catheter with its tip projecting over the right  main pulmonary artery. Otherwise, stable support devices.  2. Small bilateral pleural effusions with overlying atelectasis versus  consolidation.    ALY JUÁREZ MD        =========================================  Patient seen and examined.Investigations reviewed. Increase pump speed. Stable hemodynamics. Wean inotropes as tolerated. I agree with the findings outlined in the resident's note. I spent a total of 50 minutes examining the patient, reviewing investigations and therapeutic counseling.

## 2019-01-02 NOTE — PROGRESS NOTES
LVAD coordinator met with pt's brother today to start VAD education. Pt was still intubated and inappropriate for education. Unclear who else will be pt's caregivers and require education - will discuss with pt once extubated and awake.

## 2019-01-02 NOTE — PLAN OF CARE
OT4E: CX: Pt not appropriate for functional evaluation today. Pt not tolerating changes of position. Will check back tomorrow and evaluate if blood pressure is more stable.

## 2019-01-02 NOTE — PROGRESS NOTES
Cardiac ICU PROGRESS NOTE  January 2, 2019      CO-MORBIDITIES:   Chronic systolic heart failure (H)  (primary encounter diagnosis)  STEMI  ICD  DM     ASSESSMENT: Mariusz Sharp is a 58 year old male now s/p LVAD (HM 3) placement with Dr. Baker on 12/31/2018 for a history of ischemic cardiomyopathy with reduced ejection fraction. There were no major intraoperative events to report and the patient was able to be liberated from cardiopulmonary bypass without significant difficulty. On POD 1      TODAY'S PROGRESS:   - 1u RBCs per surgery  - SELVIN today with cardiology  - reassess weaning from ventilator pending SELVIN resutls      - dobutamine to optimize RV function, wean off of epi      - consider CT chest if outflow obstruction is thought to be anatomic, would prefer to avoid contrast given renal insufficiency  - wean to extubate on precedex      PLAN:  Neuro/pain/sedation:  - Monitor neurological status. Notify the MD for any acute changes in exam.  - Fentanyl gtt for pain.  - Propofol/Precedex gtt for sedation. Plan to extubate on precedex.  - Tylenol scheduled. Oxy/dil PRN. Robaxin PRN.   - Lidoderm     Pulmonary:   - Supplemental oxygen to keep saturation above 92 %.  - Incentive spirometer every 15- 30 minutes when awake.  - Mechanically ventilated, PST. Extubate on precedex     Cardiovascular:  #LVAD  - 2x low flow, high PI events, SELVIN 1/2/19  - Monitor hemodynamic status.   - Epinephrine wean off, start doubtamine  - MAP > 65.  - PTA amiodarone and digoxin. Will discuss with cards.  - Holding metoplolol     GI care:   - Miralax/senna-colace, suppository PRN     Fluids/ Electrolytes/ Nutrition:   - TKO  - NPO. ADAT once extubated.  - No indication for parenteral nutrition.    Renal:    - Urine output is adequate so far.  - Will continue to monitor intake and output.  - keep Perez  - Goal even     Endocrine:    - Insulin gtt     ID/ Antibiotics:  - Periop antibiotics.     Heme:     - Hemoglobin 7.2, give 1u RBC  -  Anticoagulation: possible heparin 500 straight rate this afternoon     Prophylaxis:    - Mechanical prophylaxis for DVT.   - heparin gtt  - PPI     Lines/tubes/drains:  - right IV CVC with [place], arterial line, pivs,  Perez, ett.   - Chest tubes: med x2, left pleural, pericardial tee     Disposition:  -  CV ICU     Asia Portillo MD  General Surgery, PGY-3  Pg [phone]    ====================================    TODAY'S PROGRESS:   SUBJECTIVE:   - stable overnight but had a low flow event this morning requiring epi 1mg with responsiveness.        OBJECTIVE:   1. VITAL SIGNS:   Temp:  [98.1  F (36.7  C)-99.7  F (37.6  C)] 99.7  F (37.6  C)  Heart Rate:  [56-71] 67  Resp:  [10-28] 10  MAP:  [67 mmHg-94 mmHg] 75 mmHg  Arterial Line BP: ()/(56-79) 80/67  FiO2 (%):  [30 %-35 %] 35 %  SpO2:  [95 %-100 %] 99 %  Ventilation Mode: CMV/AC  (Continuous Mandatory Ventilation/ Assist Control)  FiO2 (%): 35 %  Rate Set (breaths/minute): 10 breaths/min  Tidal Volume Set (mL): 500 mL  PEEP (cm H2O): 5 cmH2O  Pressure Support (cm H2O): 10 cmH2O  Oxygen Concentration (%): 30 %  Resp: 10      2. INTAKE/ OUTPUT:   I/O last 3 completed shifts:  In: 3082.3 [I.V.:1952.3; NG/GT:330]  Out: 1750 [Urine:1255; Emesis/NG output:75; Chest Tube:420]    3. PHYSICAL EXAMINATION:   General:   Neuro: sedated  Resp: Breathing non-labored on ventilator   CV: RRR, sinus 80s   Abdomen: Soft, Non-distended, Non-tender  Incisions: c/d/i  Extremities: warm and well perfused, trace edema     4. INVESTIGATIONS:   Arterial Blood Gases   Recent Labs   Lab 01/02/19  0812 01/02/19  0339 01/01/19  8357 01/01/19  2151   PH 7.38 7.38 7.36 7.37   PCO2 41 45 41 44   PO2 283* 127* 142* 87   HCO3 24 26 23 25     Complete Blood Count   Recent Labs   Lab 01/02/19  0812 01/02/19 0339 01/01/19  2151 01/01/19  1547   WBC 10.2 6.9 7.5 6.7   HGB 8.5* 8.0* 8.2* 8.2*   * 104* 98* 102*     Basic Metabolic Panel  Recent Labs   Lab 01/02/19 0812 01/02/19  0339  01/01/19  2151 01/01/19  1547    141 144 146*   POTASSIUM 5.2 4.9 5.0 3.6   CHLORIDE 111* 110* 114* 114*   CO2 24 25 24 25   BUN 19 20 18 19   CR 1.79* 1.66* 1.42* 1.34*   * 174* 159* 122*     Liver Function Tests  Recent Labs   Lab 01/02/19  0812 01/02/19  0339 01/01/19 2151 01/01/19  1547 01/01/19  0941 01/01/19  0343  12/31/18  1302   * 133* 26 24 35 30   < > 32   * 113* 17 17 19 17   < > 26   ALKPHOS 47 41 31* 30* 31* 26*   < > 33*   BILITOTAL 2.2* 2.4* 2.1* 1.8* 2.2* 1.8*   < > 1.4*   ALBUMIN 3.0* 3.1* 3.0* 2.8* 3.1* 2.9*   < > 2.6*   INR 1.50*  --   --   --  1.39* 1.46*  --  1.42*    < > = values in this interval not displayed.     Pancreatic Enzymes  No lab results found in last 7 days.  Coagulation Profile  Recent Labs   Lab 01/02/19  0812 01/02/19 0339 01/01/19  0941 01/01/19  0343 12/31/18  1302 12/31/18  1030   INR 1.50*  --  1.39* 1.46* 1.42* 1.65*   PTT 48* 55*  --   --  40* 34     Lactate  Invalid input(s): LACTATE    5. RADIOLOGY:   Recent Results (from the past 24 hour(s))   XR Chest Port 1 View    Narrative    Exam: Chest x-ray, 1 view, 1/1/2019.    COMPARISON: Same day, earlier.    HISTORY: Evaluate [place] catheter.    FINDINGS: AP view of the chest was obtained. Right IJ [place]  catheter with its tip projecting over the right main pulmonary artery.  Otherwise, stable support devices including left-sided chest wall  implantable cardiac defibrillator, LVAD, endotracheal tube, NG/OG  tube, left chest. Median sternotomy wires. Cardiac mediastinal  silhouette within normal limits. Small bilateral pleural effusions  with overlying atelectasis versus consolidation. No pneumothorax.      Impression    IMPRESSION:  1. Right IJ [place]-Cal[place] catheter with its tip projecting over the right  main pulmonary artery. Otherwise, stable support devices.  2. Small bilateral pleural effusions with overlying atelectasis versus  consolidation.    ALY JUÁREZ MD        =========================================

## 2019-01-03 ENCOUNTER — APPOINTMENT (OUTPATIENT)
Dept: GENERAL RADIOLOGY | Facility: CLINIC | Age: 59
End: 2019-01-03
Attending: INTERNAL MEDICINE
Payer: MEDICAID

## 2019-01-03 ENCOUNTER — APPOINTMENT (OUTPATIENT)
Dept: OCCUPATIONAL THERAPY | Facility: CLINIC | Age: 59
End: 2019-01-03
Attending: THORACIC SURGERY (CARDIOTHORACIC VASCULAR SURGERY)
Payer: MEDICAID

## 2019-01-03 LAB
ALBUMIN SERPL-MCNC: 2.7 G/DL (ref 3.4–5)
ALBUMIN SERPL-MCNC: 2.9 G/DL (ref 3.4–5)
ALBUMIN SERPL-MCNC: 2.9 G/DL (ref 3.4–5)
ALBUMIN SERPL-MCNC: 3 G/DL (ref 3.4–5)
ALP SERPL-CCNC: 49 U/L (ref 40–150)
ALP SERPL-CCNC: 50 U/L (ref 40–150)
ALP SERPL-CCNC: 51 U/L (ref 40–150)
ALP SERPL-CCNC: 53 U/L (ref 40–150)
ALT SERPL W P-5'-P-CCNC: 252 U/L (ref 0–70)
ALT SERPL W P-5'-P-CCNC: 266 U/L (ref 0–70)
ALT SERPL W P-5'-P-CCNC: 266 U/L (ref 0–70)
ALT SERPL W P-5'-P-CCNC: 280 U/L (ref 0–70)
ANION GAP SERPL CALCULATED.3IONS-SCNC: 10 MMOL/L (ref 3–14)
ANION GAP SERPL CALCULATED.3IONS-SCNC: 12 MMOL/L (ref 3–14)
APTT PPP: 50 SEC (ref 22–37)
AST SERPL W P-5'-P-CCNC: 163 U/L (ref 0–45)
AST SERPL W P-5'-P-CCNC: 183 U/L (ref 0–45)
AST SERPL W P-5'-P-CCNC: 195 U/L (ref 0–45)
AST SERPL W P-5'-P-CCNC: 241 U/L (ref 0–45)
BASE DEFICIT BLDV-SCNC: 2.6 MMOL/L
BASE DEFICIT BLDV-SCNC: 3.6 MMOL/L
BASE DEFICIT BLDV-SCNC: 4.1 MMOL/L
BILIRUB SERPL-MCNC: 1.6 MG/DL (ref 0.2–1.3)
BILIRUB SERPL-MCNC: 2.3 MG/DL (ref 0.2–1.3)
BILIRUB SERPL-MCNC: 2.6 MG/DL (ref 0.2–1.3)
BILIRUB SERPL-MCNC: 3.6 MG/DL (ref 0.2–1.3)
BUN SERPL-MCNC: 23 MG/DL (ref 7–30)
BUN SERPL-MCNC: 23 MG/DL (ref 7–30)
BUN SERPL-MCNC: 24 MG/DL (ref 7–30)
BUN SERPL-MCNC: 26 MG/DL (ref 7–30)
CALCIUM SERPL-MCNC: 7.6 MG/DL (ref 8.5–10.1)
CALCIUM SERPL-MCNC: 8.1 MG/DL (ref 8.5–10.1)
CALCIUM SERPL-MCNC: 8.1 MG/DL (ref 8.5–10.1)
CALCIUM SERPL-MCNC: 8.7 MG/DL (ref 8.5–10.1)
CHLORIDE SERPL-SCNC: 104 MMOL/L (ref 94–109)
CHLORIDE SERPL-SCNC: 105 MMOL/L (ref 94–109)
CHLORIDE SERPL-SCNC: 105 MMOL/L (ref 94–109)
CHLORIDE SERPL-SCNC: 108 MMOL/L (ref 94–109)
CO2 SERPL-SCNC: 19 MMOL/L (ref 20–32)
CO2 SERPL-SCNC: 20 MMOL/L (ref 20–32)
CO2 SERPL-SCNC: 20 MMOL/L (ref 20–32)
CO2 SERPL-SCNC: 22 MMOL/L (ref 20–32)
CREAT SERPL-MCNC: 1.32 MG/DL (ref 0.66–1.25)
CREAT SERPL-MCNC: 1.4 MG/DL (ref 0.66–1.25)
CREAT SERPL-MCNC: 1.61 MG/DL (ref 0.66–1.25)
CREAT SERPL-MCNC: 1.73 MG/DL (ref 0.66–1.25)
DIGOXIN SERPL-MCNC: 1 UG/L (ref 0.5–2)
ERYTHROCYTE [DISTWIDTH] IN BLOOD BY AUTOMATED COUNT: 15.9 % (ref 10–15)
ERYTHROCYTE [DISTWIDTH] IN BLOOD BY AUTOMATED COUNT: 16 % (ref 10–15)
ERYTHROCYTE [DISTWIDTH] IN BLOOD BY AUTOMATED COUNT: 16 % (ref 10–15)
ERYTHROCYTE [DISTWIDTH] IN BLOOD BY AUTOMATED COUNT: 16.2 % (ref 10–15)
GFR SERPL CREATININE-BSD FRML MDRD: 43 ML/MIN/{1.73_M2}
GFR SERPL CREATININE-BSD FRML MDRD: 47 ML/MIN/{1.73_M2}
GFR SERPL CREATININE-BSD FRML MDRD: 56 ML/MIN/{1.73_M2}
GFR SERPL CREATININE-BSD FRML MDRD: 60 ML/MIN/{1.73_M2}
GLUCOSE BLDC GLUCOMTR-MCNC: 145 MG/DL (ref 70–99)
GLUCOSE BLDC GLUCOMTR-MCNC: 150 MG/DL (ref 70–99)
GLUCOSE BLDC GLUCOMTR-MCNC: 152 MG/DL (ref 70–99)
GLUCOSE BLDC GLUCOMTR-MCNC: 234 MG/DL (ref 70–99)
GLUCOSE SERPL-MCNC: 130 MG/DL (ref 70–99)
GLUCOSE SERPL-MCNC: 143 MG/DL (ref 70–99)
GLUCOSE SERPL-MCNC: 145 MG/DL (ref 70–99)
GLUCOSE SERPL-MCNC: 183 MG/DL (ref 70–99)
HCO3 BLDV-SCNC: 21 MMOL/L (ref 21–28)
HCO3 BLDV-SCNC: 22 MMOL/L (ref 21–28)
HCO3 BLDV-SCNC: 22 MMOL/L (ref 21–28)
HCT VFR BLD AUTO: 25.6 % (ref 40–53)
HCT VFR BLD AUTO: 26 % (ref 40–53)
HCT VFR BLD AUTO: 26.9 % (ref 40–53)
HCT VFR BLD AUTO: 27.3 % (ref 40–53)
HGB BLD-MCNC: 8 G/DL (ref 13.3–17.7)
HGB BLD-MCNC: 8.1 G/DL (ref 13.3–17.7)
HGB BLD-MCNC: 8.4 G/DL (ref 13.3–17.7)
HGB BLD-MCNC: 8.7 G/DL (ref 13.3–17.7)
INR PPP: 1.38 (ref 0.86–1.14)
LACTATE BLD-SCNC: 0.8 MMOL/L (ref 0.7–2)
LACTATE BLD-SCNC: 1.1 MMOL/L (ref 0.7–2)
LMWH PPP CHRO-ACNC: <0.1 IU/ML
MAGNESIUM SERPL-MCNC: 2.1 MG/DL (ref 1.6–2.3)
MAGNESIUM SERPL-MCNC: 2.3 MG/DL (ref 1.6–2.3)
MCH RBC QN AUTO: 27.9 PG (ref 26.5–33)
MCH RBC QN AUTO: 28.2 PG (ref 26.5–33)
MCHC RBC AUTO-ENTMCNC: 31.2 G/DL (ref 31.5–36.5)
MCHC RBC AUTO-ENTMCNC: 31.2 G/DL (ref 31.5–36.5)
MCHC RBC AUTO-ENTMCNC: 31.3 G/DL (ref 31.5–36.5)
MCHC RBC AUTO-ENTMCNC: 31.9 G/DL (ref 31.5–36.5)
MCV RBC AUTO: 88 FL (ref 78–100)
MCV RBC AUTO: 89 FL (ref 78–100)
MCV RBC AUTO: 89 FL (ref 78–100)
MCV RBC AUTO: 90 FL (ref 78–100)
O2/TOTAL GAS SETTING VFR VENT: 21 %
O2/TOTAL GAS SETTING VFR VENT: 21 %
O2/TOTAL GAS SETTING VFR VENT: ABNORMAL %
OXYHGB MFR BLDV: 52 %
OXYHGB MFR BLDV: 52 %
OXYHGB MFR BLDV: 57 %
PCO2 BLDV: 37 MM HG (ref 40–50)
PCO2 BLDV: 38 MM HG (ref 40–50)
PCO2 BLDV: 38 MM HG (ref 40–50)
PH BLDV: 7.35 PH (ref 7.32–7.43)
PH BLDV: 7.36 PH (ref 7.32–7.43)
PH BLDV: 7.38 PH (ref 7.32–7.43)
PHOSPHATE SERPL-MCNC: 2.9 MG/DL (ref 2.5–4.5)
PHOSPHATE SERPL-MCNC: 3.5 MG/DL (ref 2.5–4.5)
PLATELET # BLD AUTO: 101 10E9/L (ref 150–450)
PLATELET # BLD AUTO: 107 10E9/L (ref 150–450)
PLATELET # BLD AUTO: 92 10E9/L (ref 150–450)
PLATELET # BLD AUTO: 98 10E9/L (ref 150–450)
PO2 BLDV: 29 MM HG (ref 25–47)
PO2 BLDV: 30 MM HG (ref 25–47)
PO2 BLDV: 31 MM HG (ref 25–47)
POTASSIUM SERPL-SCNC: 4 MMOL/L (ref 3.4–5.3)
POTASSIUM SERPL-SCNC: 4.2 MMOL/L (ref 3.4–5.3)
POTASSIUM SERPL-SCNC: 4.6 MMOL/L (ref 3.4–5.3)
POTASSIUM SERPL-SCNC: 4.6 MMOL/L (ref 3.4–5.3)
PROT SERPL-MCNC: 5.4 G/DL (ref 6.8–8.8)
PROT SERPL-MCNC: 5.5 G/DL (ref 6.8–8.8)
PROT SERPL-MCNC: 5.6 G/DL (ref 6.8–8.8)
PROT SERPL-MCNC: 6 G/DL (ref 6.8–8.8)
RBC # BLD AUTO: 2.87 10E12/L (ref 4.4–5.9)
RBC # BLD AUTO: 2.9 10E12/L (ref 4.4–5.9)
RBC # BLD AUTO: 3.01 10E12/L (ref 4.4–5.9)
RBC # BLD AUTO: 3.09 10E12/L (ref 4.4–5.9)
SODIUM SERPL-SCNC: 136 MMOL/L (ref 133–144)
SODIUM SERPL-SCNC: 137 MMOL/L (ref 133–144)
SODIUM SERPL-SCNC: 137 MMOL/L (ref 133–144)
SODIUM SERPL-SCNC: 139 MMOL/L (ref 133–144)
WBC # BLD AUTO: 6.4 10E9/L (ref 4–11)
WBC # BLD AUTO: 6.7 10E9/L (ref 4–11)
WBC # BLD AUTO: 7.1 10E9/L (ref 4–11)
WBC # BLD AUTO: 7.1 10E9/L (ref 4–11)

## 2019-01-03 PROCEDURE — 82805 BLOOD GASES W/O2 SATURATION: CPT | Performed by: THORACIC SURGERY (CARDIOTHORACIC VASCULAR SURGERY)

## 2019-01-03 PROCEDURE — 25000132 ZZH RX MED GY IP 250 OP 250 PS 637: Performed by: SURGERY

## 2019-01-03 PROCEDURE — 99291 CRITICAL CARE FIRST HOUR: CPT | Mod: GC | Performed by: INTERNAL MEDICINE

## 2019-01-03 PROCEDURE — 97165 OT EVAL LOW COMPLEX 30 MIN: CPT | Mod: GO

## 2019-01-03 PROCEDURE — 25000128 H RX IP 250 OP 636: Performed by: STUDENT IN AN ORGANIZED HEALTH CARE EDUCATION/TRAINING PROGRAM

## 2019-01-03 PROCEDURE — 40000014 ZZH STATISTIC ARTERIAL MONITORING DAILY

## 2019-01-03 PROCEDURE — 20000004 ZZH R&B ICU UMMC

## 2019-01-03 PROCEDURE — 00000146 ZZHCL STATISTIC GLUCOSE BY METER IP

## 2019-01-03 PROCEDURE — 84100 ASSAY OF PHOSPHORUS: CPT | Performed by: SURGERY

## 2019-01-03 PROCEDURE — 25000131 ZZH RX MED GY IP 250 OP 636 PS 637: Performed by: STUDENT IN AN ORGANIZED HEALTH CARE EDUCATION/TRAINING PROGRAM

## 2019-01-03 PROCEDURE — 83605 ASSAY OF LACTIC ACID: CPT | Performed by: SURGERY

## 2019-01-03 PROCEDURE — 97535 SELF CARE MNGMENT TRAINING: CPT | Mod: GO

## 2019-01-03 PROCEDURE — 85730 THROMBOPLASTIN TIME PARTIAL: CPT | Performed by: SURGERY

## 2019-01-03 PROCEDURE — C9113 INJ PANTOPRAZOLE SODIUM, VIA: HCPCS | Performed by: THORACIC SURGERY (CARDIOTHORACIC VASCULAR SURGERY)

## 2019-01-03 PROCEDURE — 85027 COMPLETE CBC AUTOMATED: CPT | Performed by: SURGERY

## 2019-01-03 PROCEDURE — 83735 ASSAY OF MAGNESIUM: CPT | Performed by: SURGERY

## 2019-01-03 PROCEDURE — 85520 HEPARIN ASSAY: CPT | Performed by: STUDENT IN AN ORGANIZED HEALTH CARE EDUCATION/TRAINING PROGRAM

## 2019-01-03 PROCEDURE — 71045 X-RAY EXAM CHEST 1 VIEW: CPT

## 2019-01-03 PROCEDURE — 25000128 H RX IP 250 OP 636: Performed by: THORACIC SURGERY (CARDIOTHORACIC VASCULAR SURGERY)

## 2019-01-03 PROCEDURE — 85610 PROTHROMBIN TIME: CPT | Performed by: SURGERY

## 2019-01-03 PROCEDURE — 40000275 ZZH STATISTIC RCP TIME EA 10 MIN

## 2019-01-03 PROCEDURE — 40000196 ZZH STATISTIC RAPCV CVP MONITORING

## 2019-01-03 PROCEDURE — 82805 BLOOD GASES W/O2 SATURATION: CPT | Performed by: INTERNAL MEDICINE

## 2019-01-03 PROCEDURE — 80053 COMPREHEN METABOLIC PANEL: CPT | Performed by: SURGERY

## 2019-01-03 PROCEDURE — 80162 ASSAY OF DIGOXIN TOTAL: CPT | Performed by: SURGERY

## 2019-01-03 PROCEDURE — 40000133 ZZH STATISTIC OT WARD VISIT

## 2019-01-03 PROCEDURE — 25000132 ZZH RX MED GY IP 250 OP 250 PS 637: Performed by: THORACIC SURGERY (CARDIOTHORACIC VASCULAR SURGERY)

## 2019-01-03 PROCEDURE — 40000048 ZZH STATISTIC DAILY SWAN MONITORING

## 2019-01-03 PROCEDURE — 83605 ASSAY OF LACTIC ACID: CPT | Performed by: THORACIC SURGERY (CARDIOTHORACIC VASCULAR SURGERY)

## 2019-01-03 PROCEDURE — 25000132 ZZH RX MED GY IP 250 OP 250 PS 637

## 2019-01-03 RX ORDER — WARFARIN SODIUM 2 MG/1
2 TABLET ORAL
Status: COMPLETED | OUTPATIENT
Start: 2019-01-03 | End: 2019-01-03

## 2019-01-03 RX ORDER — HYDRALAZINE HYDROCHLORIDE 20 MG/ML
10 INJECTION INTRAMUSCULAR; INTRAVENOUS ONCE
Status: COMPLETED | OUTPATIENT
Start: 2019-01-03 | End: 2019-01-03

## 2019-01-03 RX ORDER — HEPARIN SODIUM 10000 [USP'U]/100ML
0-3500 INJECTION, SOLUTION INTRAVENOUS CONTINUOUS
Status: DISCONTINUED | OUTPATIENT
Start: 2019-01-03 | End: 2019-01-11 | Stop reason: HOSPADM

## 2019-01-03 RX ORDER — PANTOPRAZOLE SODIUM 40 MG/1
40 TABLET, DELAYED RELEASE ORAL
Status: DISCONTINUED | OUTPATIENT
Start: 2019-01-04 | End: 2019-01-11 | Stop reason: HOSPADM

## 2019-01-03 RX ADMIN — SENNOSIDES AND DOCUSATE SODIUM 1 TABLET: 8.6; 5 TABLET ORAL at 08:51

## 2019-01-03 RX ADMIN — POLYETHYLENE GLYCOL 3350 17 G: 17 POWDER, FOR SOLUTION ORAL at 08:51

## 2019-01-03 RX ADMIN — PANTOPRAZOLE SODIUM 40 MG: 40 INJECTION, POWDER, FOR SOLUTION INTRAVENOUS at 08:51

## 2019-01-03 RX ADMIN — INSULIN ASPART 4 UNITS: 100 INJECTION, SOLUTION INTRAVENOUS; SUBCUTANEOUS at 16:11

## 2019-01-03 RX ADMIN — AMIODARONE HYDROCHLORIDE 200 MG: 200 TABLET ORAL at 08:51

## 2019-01-03 RX ADMIN — HEPARIN SODIUM 900 UNITS/HR: 10000 INJECTION, SOLUTION INTRAVENOUS at 16:23

## 2019-01-03 RX ADMIN — HYDRALAZINE HYDROCHLORIDE 10 MG: 20 INJECTION INTRAMUSCULAR; INTRAVENOUS at 01:17

## 2019-01-03 RX ADMIN — OXYCODONE HYDROCHLORIDE 5 MG: 5 TABLET ORAL at 01:47

## 2019-01-03 RX ADMIN — LIDOCAINE 1 PATCH: 560 PATCH PERCUTANEOUS; TOPICAL; TRANSDERMAL at 08:52

## 2019-01-03 RX ADMIN — MUPIROCIN 1 G: 20 OINTMENT TOPICAL at 19:34

## 2019-01-03 RX ADMIN — DIGOXIN 125 MCG: 125 TABLET ORAL at 08:51

## 2019-01-03 RX ADMIN — ACETAMINOPHEN 650 MG: 325 TABLET, FILM COATED ORAL at 17:21

## 2019-01-03 RX ADMIN — WARFARIN SODIUM 2 MG: 2 TABLET ORAL at 17:21

## 2019-01-03 RX ADMIN — INSULIN GLARGINE 10 UNITS: 100 INJECTION, SOLUTION SUBCUTANEOUS at 21:52

## 2019-01-03 RX ADMIN — GABAPENTIN 100 MG: 100 CAPSULE ORAL at 08:51

## 2019-01-03 RX ADMIN — Medication 1 MG: at 21:50

## 2019-01-03 RX ADMIN — HYDRALAZINE HYDROCHLORIDE 10 MG: 20 INJECTION INTRAMUSCULAR; INTRAVENOUS at 16:49

## 2019-01-03 RX ADMIN — INSULIN ASPART 1 UNITS: 100 INJECTION, SOLUTION INTRAVENOUS; SUBCUTANEOUS at 09:12

## 2019-01-03 RX ADMIN — INSULIN ASPART 1 UNITS: 100 INJECTION, SOLUTION INTRAVENOUS; SUBCUTANEOUS at 13:04

## 2019-01-03 RX ADMIN — ACETAMINOPHEN 650 MG: 325 TABLET, FILM COATED ORAL at 21:50

## 2019-01-03 RX ADMIN — MUPIROCIN 1 G: 20 OINTMENT TOPICAL at 08:51

## 2019-01-03 RX ADMIN — OXYCODONE HYDROCHLORIDE 5 MG: 5 TABLET ORAL at 03:59

## 2019-01-03 RX ADMIN — ACETAMINOPHEN 975 MG: 325 TABLET, FILM COATED ORAL at 08:51

## 2019-01-03 RX ADMIN — ASPIRIN 81 MG 81 MG: 81 TABLET ORAL at 08:51

## 2019-01-03 RX ADMIN — HYDRALAZINE HYDROCHLORIDE 10 MG: 20 INJECTION INTRAMUSCULAR; INTRAVENOUS at 21:57

## 2019-01-03 RX ADMIN — ONDANSETRON 4 MG: 2 INJECTION INTRAMUSCULAR; INTRAVENOUS at 17:27

## 2019-01-03 RX ADMIN — ACETAMINOPHEN 650 MG: 325 TABLET, FILM COATED ORAL at 10:59

## 2019-01-03 ASSESSMENT — ACTIVITIES OF DAILY LIVING (ADL)
ADLS_ACUITY_SCORE: 17

## 2019-01-03 ASSESSMENT — MIFFLIN-ST. JEOR: SCORE: 1510.75

## 2019-01-03 NOTE — PROGRESS NOTES
ADVANCED HEART FAILURE PROGRESS NOTE    SUBJECTIVE:  No acute overnight events. Since the initiation of dobutamine PI have improved significantly. In pain today.     Speed was increased by CT surg to 5200 this morning.     ROS otherwise negative.    OBJECTIVE:  Vital signs:  Temp: 99  F (37.2  C) Temp  Min: 97.5  F (36.4  C)  Max: 101.5  F (38.6  C)  Heart Rate: 76 Heart Rate  Min: 59  Max: 91    No data recorded.  No data recorded.    Resp: 20 Resp  Min: 13  Max: 22  SpO2: 98 % SpO2  Min: 93 %  Max: 100 %    HM3: 5100 RPM, 3.7 LPM, PI 5-7     [place]: CVP 10, PA 32/13, PCW 16, CO/CI 5.5/2.8      Intake/Output Summary (Last 24 hours) at 1/2/2019 0635  Last data filed at 1/2/2019 0600  Gross per 24 hour   Intake 3082.3 ml   Output 1750 ml   Net 1332.3 ml       Vitals:    01/01/19 0345 01/02/19 0330 01/03/19 0400   Weight: 83.2 kg (183 lb 6.8 oz) 84 kg (185 lb 3 oz) 85.1 kg (187 lb 9.8 oz)       Physical Exam:  Gen: NAD  HEENT: minimally elevated JVP  Resp: coarse bilaterally, no clear wheezes  CVS: normal rate, VAD hum, no JVD  Abdo: soft, nontender  Extremities: warm, no edema   Neuro: non focal    Medications:    IV fluid REPLACEMENT ONLY       DOBUTamine 2.5 mcg/kg/min (01/03/19 0800)     HEParin 900 Units/hr (01/03/19 1006)     - MEDICATION INSTRUCTIONS -       BETA BLOCKER NOT PRESCRIBED         Current Facility-Administered Medications   Medication Dose Route Frequency     acetaminophen  975 mg Oral Q8H     amiodarone  200 mg Oral Daily     aspirin  81 mg Oral Daily     digoxin  125 mcg Oral Daily     gabapentin  100 mg Oral TID     influenza vaccine adult (product based on age)  0.5 mL Intramuscular Prior to discharge     insulin aspart  1-10 Units Subcutaneous TID AC     insulin aspart  1-7 Units Subcutaneous At Bedtime     insulin glargine  10 Units Subcutaneous At Bedtime     lidocaine  2 patch Transdermal Q24H     lidocaine   Transdermal Q8H     lidocaine   Transdermal Q24h     mupirocin  1 g Both Nostrils  BID     [START ON 1/4/2019] pantoprazole  40 mg Oral QAM AC     polyethylene glycol  17 g Oral BID     senna-docusate  1 tablet Oral BID    Or     senna-docusate  2 tablet Oral BID     sodium chloride (PF)  10 mL Intravenous Once     sodium chloride (PF)  3 mL Intracatheter Q8H     sodium chloride (PF)  3 mL Intracatheter Q8H         Labs:  CMP  Recent Labs   Lab 01/03/19  0959 01/03/19  0336  01/02/19  0812 01/02/19  0339  12/30/18 2007    136   < > 142 141   < > 137   POTASSIUM 4.6 4.6   < > 5.2 4.9   < > 3.6   CHLORIDE 105 104   < > 111* 110*   < > 102   CO2 22 20   < > 24 25   < > 28   BUN 23 24   < > 19 20   < > 40*   CR 1.61* 1.73*   < > 1.79* 1.66*   < > 1.67*   ARLENE 8.1* 8.1*   < > 8.2* 7.8*   < > 8.5   MAG  --  2.3  --   --  2.6*   < >  --    PHOS  --  3.5  --   --  5.3*   < >  --    * 130*   < > 152* 174*   < > 280*   LDH  --   --   --  443*  --   --  145   ALKPHOS 50 51   < > 47 41   < > 43   BILITOTAL 2.6* 3.6*   < > 2.2* 2.4*   < > 0.7   * 241*   < > 385* 133*   < > 20   * 280*   < > 250* 113*   < > 31    < > = values in this interval not displayed.     BLOOD GAS  Recent Labs   Lab 01/02/19  1416 01/02/19  1258   PH 7.43 7.44   PCO2 36 36   PO2 115* 107*     CBC  Recent Labs   Lab 01/03/19  0959 01/03/19  0336   WBC 6.7 7.1   HGB 8.1* 8.0*   HCT 26.0* 25.6*   PLT 98* 92*     COAG  Recent Labs   Lab 01/03/19  0959 01/03/19  0336 01/02/19  0812   INR 1.38*  --  1.50*   PTT  --  50* 48*         ASSESSMENT/PLAN:  58 year old male with a history of ICM, CAD (s/p STEMI with ALYSSA to LAD 6/27/18), monomorphic VT, LV thrombus, CKD Stage III, paroxysmal AF, DM Type II who was admitted for placement of LVAD. Now s/p HM3 LVAD 12/31.     HeartMate III LVAD placed 12/31:  - speed 5200 RPM per surgery  - on ASA 81  - on fixed rate heparin  - switched epi to dobutamine for RV support  - hold on diuresis today given normal wedge  - SELVIN showed moderate-severe RV dysfunction, LV unloaded with  inflow cannula in good position     Ischemic cardiomyopathy, CAD:  - ALYSSA to LAD 6/27/2018  - was not on ACE-I due to CKD  - holding BB after LVAD  - continue rosuvastatin  - was on plavix, holding now after surgery     Paroxysmal afib:  - currently in sinus rhythm  - holding BB after LVAD  - starting heparin drip     CKD  - stable with good urine output     Seen and staffed with Dr. Miroslava Wakefield MD  Cardiology Fellow

## 2019-01-03 NOTE — PROGRESS NOTES
Cardiac ICU PROGRESS NOTE  January 3, 2019    CO-MORBIDITIES:   Chronic systolic heart failure (H)  (primary encounter diagnosis)  STEMI  ICD  DM     ASSESSMENT: Mariusz Sharp is a 58 year old male now s/p LVAD (HM 3) placement with Dr. Baker on 12/31/2018 for a history of ischemic cardiomyopathy with reduced ejection fraction. There were no major intraoperative events to report and the patient was able to be liberated from cardiopulmonary bypass without significant difficulty. Concern regarding low flow states prompted keeping the patient intubated on 1/1 and on 1/2 following a SELVIN, the patient was able to be extubated.      TODAY'S PROGRESS:   - low intensity heparin, warfarin tonight  - regular diet  - lantus 10 at bedtime  - out of bed, ambulate, PT/OT  - batsheva perez per cardiology  - keep dobutamine     PLAN:  Neuro/pain/sedation:  - Monitor neurological status. Notify the MD for any acute changes in exam.  - TylenolPRN. Oxy/dil PRN. Robaxin PRN.   - Lidoderm     Pulmonary:   - Supplemental oxygen to keep saturation above 92 %.  - Incentive spirometer every 15- 30 minutes when awake.     Cardiovascular:  #LVAD  - 2x low flow, high PI events, SELVIN 1/2/19 no issues since extubation  - Monitor hemodynamic status.   - Dobutamine 2.5  - MAP > 65.  - PTA amiodarone and digoxin  - Holding metoplolol     GI care:   - Miralax/senna-colace scheulded, suppository PRN  - LFTs downtrending     Fluids/ Electrolytes/ Nutrition:   - TKO  - ADAT    Renal:    - Urine output is adequate so far.  - Will continue to monitor intake and output.  - Goal slight negative     Endocrine:    - Insulin sliding scale  - lantus 10u at bedtime    ID/ Antibiotics:  - Periop antibiotics completed     Heme:     - Anticoagulation: heparin low intensity     Prophylaxis:    - Mechanical prophylaxis for DVT.   - heparin gtt  - PPI     Lines/tubes/drains:  - right IV CVC with [place], arterial line, pivs,   - Chest tubes: med x2, left pleural, pericardial  tee     Disposition:  -  CV ICU, possible transfer to floor tomorrow     Gopal Grimes MD  [id number]  ====================================    TODAY'S PROGRESS:   SUBJECTIVE:   - comfortable overnight, feeling well otherwise.        OBJECTIVE:   1. VITAL SIGNS:   Temp:  [97.5  F (36.4  C)-101.5  F (38.6  C)] 97.5  F (36.4  C)  Heart Rate:  [59-91] 76  Resp:  [13-22] 20  MAP:  [59 mmHg-95 mmHg] 75 mmHg  Arterial Line BP: ()/(47-82) 92/60  FiO2 (%):  [35 %] 35 %  SpO2:  [93 %-100 %] 98 %  Ventilation Mode: CPAP/PS  (Continuous positive airway pressure with Pressure Support)  FiO2 (%): 35 %  Rate Set (breaths/minute): 10 breaths/min  Tidal Volume Set (mL): 500 mL  PEEP (cm H2O): 5 cmH2O  Pressure Support (cm H2O): 7 cmH2O  Oxygen Concentration (%): 30 %  Resp: 20      2. INTAKE/ OUTPUT:   I/O last 3 completed shifts:  In: 2340.41 [P.O.:825; I.V.:815.41; NG/GT:150]  Out: 1394 [Urine:864; Emesis/NG output:150; Chest Tube:380]    3. PHYSICAL EXAMINATION:   General:   Neuro: A&Ox3, NAD  Resp: Breathing non-labored  CV: RRR  Abdomen: Soft, Non-distended, Non-tender  Incisions: c/d/i  Extremities: warm and well perfused, mild edema     4. INVESTIGATIONS:   Arterial Blood Gases   Recent Labs   Lab 01/02/19  1416 01/02/19  1258 01/02/19  0812 01/02/19  0339   PH 7.43 7.44 7.38 7.38   PCO2 36 36 41 45   PO2 115* 107* 283* 127*   HCO3 24 24 24 26     Complete Blood Count   Recent Labs   Lab 01/03/19  0336 01/02/19  1546 01/02/19  0812 01/02/19  0339   WBC 7.1 6.6 10.2 6.9   HGB 8.0* 8.8* 8.5* 8.0*   PLT 92* 96* 107* 104*     Basic Metabolic Panel  Recent Labs   Lab 01/03/19  0336 01/02/19  1546 01/02/19  0812 01/02/19  0339    141 142 141   POTASSIUM 4.6 4.5 5.2 4.9   CHLORIDE 104 110* 111* 110*   CO2 20 23 24 25   BUN 24 20 19 20   CR 1.73* 1.69* 1.79* 1.66*   * 121* 152* 174*     Liver Function Tests  Recent Labs   Lab 01/03/19  0336 01/02/19  1546 01/02/19  0812 01/02/19  0339  01/01/19  0941  01/01/19  0343  12/31/18  1302   * 476* 385* 133*   < > 35 30   < > 32   * 311* 250* 113*   < > 19 17   < > 26   ALKPHOS 51 51 47 41   < > 31* 26*   < > 33*   BILITOTAL 3.6* 3.6* 2.2* 2.4*   < > 2.2* 1.8*   < > 1.4*   ALBUMIN 2.9* 3.2* 3.0* 3.1*   < > 3.1* 2.9*   < > 2.6*   INR  --   --  1.50*  --   --  1.39* 1.46*  --  1.42*    < > = values in this interval not displayed.     Pancreatic Enzymes  No lab results found in last 7 days.  Coagulation Profile  Recent Labs   Lab 01/03/19  0336 01/02/19  0812 01/02/19  0339 01/01/19  0941 01/01/19  0343 12/31/18  1302   INR  --  1.50*  --  1.39* 1.46* 1.42*   PTT 50* 48* 55*  --   --  40*     Lactate  Invalid input(s): LACTATE    5. RADIOLOGY:   No results found for this or any previous visit (from the past 24 hour(s)).    =========================================

## 2019-01-03 NOTE — PLAN OF CARE
Discharge Planner OT   Patient plan for discharge: Home  Current status: Educated on OT/cardiac rehab role and POC with hospitalization. Pt educated on sternal precautions and implications for I/ADLs and functional transfers in preparation for OOB activity. Pt supine > sitting EOB with mod A x 2. Pt requiring max A x 1 for scooting to EOB. STS from EOB with min A x 2. While standing, pt with incontinent BM. Unable to tolerate continued standing for pericares secondary to dizziness, required multiple seated rest breaks.Completed commode transfer with min A/CGA x 2, dependent for prema cares, min A of another for line management. Pt with 1 occurrence of dizziness during session with standing but resolving quickly upon sitting. HR, MAP, and SpO2 steady throughout session. PIs initialy in 7s but with activity declining to as low as 3.1. Monitored closely during session with typical recover of PI once seated rest break obtained. RN aware.   Barriers to return to prior living situation: post-surgical precautions, pain, decreased strength/activity tolerance, dependence for ADLs, impaired mobility  Recommendations for discharge: ARU  Rationale for recommendations: Pt is currently below functional baseline for ADLs, transfers, and functional mobility, would benefit from continued therapy to address above deficits and maximize strength/independence in order to return to PLOF. 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 Pt presents as excellent ARU candidate due to high level of multidisciplinary needs.       Entered by: China Riojas 01/03/2019 1:48 PM

## 2019-01-03 NOTE — PLAN OF CARE
A&O x4, moves all extremities. L radial art line positional, Maps 70-90s, HR sinus rhythm 60-70s. PAP 36-38/14-16, CVP 11-14, LVAD and [place] numbers stable (see flow sheets) Tmax 100.6. Hydralazine given x 1.  On 2L NC, good cough and coughing up small amount thick secretions. 2 mediastinal chest tubes (Y'd together), 1 pleural and 1 pericardial chest tube ( Y'd together), both serosanguinous fluid.  Drinking water and taking meds by mouth well.  [place] at 55, dobutamine gtt 2.5 mcg/kg/min, heparin gtt 500 unit(s)/hr. Complained of mild/moderate incisional pain, controlled by tylenol and oxy. Will continue with plan of care.     Cardiac Output Decreased  Effective Cardiac Output  1/3/2019 0610 - No Change by Kristine Cedeño, RN     Diabetes Comorbidity  Blood Glucose Level Within Desired Range  1/3/2019 0610 - No Change by Kristine Cedeño, RN

## 2019-01-03 NOTE — PHARMACY-VANCOMYCIN DOSING SERVICE
Clinical Pharmacy - Warfarin Dosing Consult     Pharmacy has been consulted to manage this patient s warfarin therapy.  Indication: LVAD/RVAD  Therapy Goal: INR 2-3  Provider/Team: CVTS  Warfarin Prior to Admission: Yes  Warfarin PTA Regimen: 4 mg po daily  Significant drug interactions: aspirin, oral amiodarone, heparin infusion as bridge to warfarin therapy  Recent documented change in oral intake/nutrition: No  Dose Comments: will dose conservatively as patient has increased liver enzymes + RV defects    INR   Date Value Ref Range Status   01/03/2019 1.38 (H) 0.86 - 1.14 Final   01/02/2019 1.50 (H) 0.86 - 1.14 Final       Recommend warfarin 2 mg today.  Pharmacy will monitor Mariusz Sharp daily and order warfarin doses to achieve specified goal.      Please contact pharmacy as soon as possible if the warfarin needs to be held for a procedure or if the warfarin goals change.      Stephanie Hernandez, PharmD

## 2019-01-03 NOTE — PROGRESS NOTES
D: Stopped by to see patient.   I: Discussed POC and provided support and listened to patient and care giver's thoughts and concerns. Will begin LVAD education with pt tomorrow at 0930. Pt will contact his friend, Ryle, and sons, Chuck and Salo, to see when they are available next week for education.   P: Continue to follow patient and address any questions or concerns patient and or caregiver may have.

## 2019-01-03 NOTE — PROGRESS NOTES
" 01/03/19 1324   Quick Adds   Type of Visit Initial Occupational Therapy Evaluation   Living Environment   Lives With parent(s)   Living Arrangements house   Home Accessibility stairs to enter home   Living Environment Comment Pt has 5-6 LUIS and all needs met on main level. Pt with walk-in shower and built in bench. Pt is on disability and is primary caregiver to his father who has dementia. Pt's brother is currently caring for father. Pt has 3 sons but uncertain on their level of support/ability to assist   Self-Care   Usual Activity Tolerance good   Current Activity Tolerance fair   Equipment Currently Used at Home none   Activity/Exercise/Self-Care Comment Pt reports jogging prior recent heart attack   Functional Level   Ambulation 0-->independent   Transferring 0-->independent   Toileting 0-->independent   Bathing 0-->independent   Dressing 0-->independent   Eating 0-->independent   Communication 0-->understands/communicates without difficulty   Swallowing 0-->swallows foods/liquids without difficulty   Cognition 0 - no cognition issues reported   Fall history within last six months no   Which of the above functional risks had a recent onset or change? ambulation;transferring;toileting;bathing;dressing   Prior Functional Level Comment Pt previously IND with I/ADLs   General Information   Onset of Illness/Injury or Date of Surgery - Date 12/29/19   Referring Physician Jenni Ortiz MD   Patient/Family Goals Statement none stated   Additional Occupational Profile Info/Pertinent History of Current Problem \"58 year old male now s/p LVAD (HM 3) placement with Dr. Baker on 12/31/2018 for a history of ischemic cardiomyopathy with reduced ejection fraction. There were no major intraoperative events to report and the patient was able to be liberated from cardiopulmonary bypass without significant difficulty.\"   Precautions/Limitations sternal precautions;fall precautions   Weight-Bearing Status - LUE partial " weight-bearing (% in comments)  (<10#)   Weight-Bearing Status - RUE partial weight-bearing (% in comments)  (<10#)   Weight-Bearing Status - LLE full weight-bearing   Weight-Bearing Status - RLE full weight-bearing   General Info Comments Activity: ambulate with assist TID and PRN   Cognitive Status Examination   Orientation orientation to person, place and time   Cognitive Comment Pt with some lethargic and requiring increased time for processing but following commands appropriately.    Visual Perception   Visual Perception No deficits were identified;Wears glasses   Integumentary/Edema   Integumentary/Edema Comments mild swelling in BLEs   Posture   Posture forward head position;protracted shoulders;kyphosis   Range of Motion (ROM)   ROM Comment BUE ROM WFL witin precautions   Strength   Strength Comments MMT: not formally tested due to sternal precautions but appears WFL despite deconditioning   Coordination   Upper Extremity Coordination No deficits were identified   Mobility   Bed Mobility Bed mobility skill: Supine to sit   Bed Mobility Skill: Supine to Sit   Level of [place]: Supine/Sit moderate assist (50% patients effort)   Physical Assist/Nonphysical Assist: Supine/Sit 2 persons;verbal cues  (HOB elevated)   Transfer Skill: Bed to Chair/Chair to Bed   Level of [place]: Bed to Chair minimum assist (75% patients effort)   Physical Assist/Nonphysical Assist: Bed to Chair 1 person + 1 person to manage equipment;verbal cues   Transfer Skill: Sit to Stand   Level of [place]: Sit/Stand minimum assist (75% patients effort)   Physical Assist/Nonphysical Assist: Sit/Stand verbal cues;2 persons   Transfer Skill: Toilet Transfer   Level of [place]: Toilet minimum assist (75% patients effort)   Physical Assist/Nonphysical Assist: Toilet verbal cues;2 persons   Assistive Device (commode)   Lower Body Dressing   Level of [place]: Dress Lower Body maximum assist (25% patients effort)   Toileting  "  Level of [place]: Toilet dependent (less than 25% patients effort)   Activities of Daily Living Analysis   Impairments Contributing to Impaired Activities of Daily Living balance impaired;pain;post surgical precautions;strength decreased   General Therapy Interventions   Planned Therapy Interventions ADL retraining;IADL retraining;strengthening;transfer training;home program guidelines;progressive activity/exercise;risk factor education   Clinical Impression   Criteria for Skilled Therapeutic Interventions Met yes, treatment indicated   OT Diagnosis decreased IND with I/ADls   Influenced by the following impairments weakness, fatigue, medical acuity, surgical precautions, pain   Assessment of Occupational Performance 5 or more Performance Deficits   Identified Performance Deficits dressing, bathing, toileting, functional mobility, caregiving, home management   Clinical Decision Making (Complexity) Low complexity   Therapy Frequency daily   Predicted Duration of Therapy Intervention (days/wks) 2 weeks   Anticipated Discharge Disposition Acute Rehabilitation Facility   Risks and Benefits of Treatment have been explained. Yes   Patient, Family & other staff in agreement with plan of care Yes   [facility] TM \"6 Clicks\"   2016, Trustees of [facility], under license to [employer].  All rights reserved.   6 Clicks Short Forms Daily Activity Inpatient Short Form   [facility]  \"6 Clicks\" Daily Activity Inpatient Short Form   1. Putting on and taking off regular lower body clothing? 2 - A Lot   2. Bathing (including washing, rinsing, drying)? 2 - A Lot   3. Toileting, which includes using toilet, bedpan or urinal? 2 - A Lot   4. Putting on and taking off regular upper body clothing? 2 - A Lot   5. Taking care of personal grooming such as brushing teeth? 3 - A Little   6. Eating meals? 4 - None   Daily Activity Raw Score (Score out of 24.Lower scores equate to lower levels of " function) 15   Total Evaluation Time   Total Evaluation Time (Minutes) 10

## 2019-01-03 NOTE — PLAN OF CARE
D/I/A: A&O x4, moves all extremities. L radial art line positional, Maps 70-80s, HR sinus rhythm 60-70s. PAP 30-36/14-18, CVP 12-16, LVAD and [place] numbers stable (see flow sheets) after episode this morning (see progress note). Tmax 101.5.  Extubated at 1300, on 2L NC, good cough and coughing up small amount thick secretions. 2 mediastinal chest tubes (Y'd together), 1 pleural and 1 pericardial chest tube ( Y'd together), both 10-30 ml/hr output serosanguinous fluid.  Drinking water and taking meds by mouth well. Perez d/c'd at 1700, was putting out 40-80 ml/hr. [place] at 55, dobutamine gtt 2.5 mcg/kg/min, heparin gtt 500 unit(s)/hr. Pivoted to chair w/ assist x3, stayed in chair ~ 90 mins.  C/o mild/moderate incisional pain, controlled by tylenol, didn't want to take oxy.   P: Continue with plan of care.

## 2019-01-03 NOTE — PROGRESS NOTES
CVTs PROGRESS NOTE  January 3, 2019    CO-MORBIDITIES:   Chronic systolic heart failure (H)  (primary encounter diagnosis)  STEMI  ICD  DM     ASSESSMENT: Mariusz Sharp is a 58 year old male now s/p LVAD (HM 3) placement with Dr. Baker on 12/31/2018 for a history of ischemic cardiomyopathy with reduced ejection fraction. There were no major intraoperative events to report and the patient was able to be liberated from cardiopulmonary bypass without significant difficulty. Concern regarding low flow states prompted keeping the patient intubated on 1/1 and on 1/2 following a SELVIN, the patient was able to be extubated.      TODAY'S PROGRESS:   - low intensity heparin, warfarin tonight  - regular diet  - lantus 10 at bedtime  - out of bed, ambulate, PT/OT  - batsheva perez per cardiology  - keep dobutamine     PLAN:  Neuro/pain/sedation:  - Monitor neurological status. Notify the MD for any acute changes in exam.  - TylenolPRN. Oxy/dil PRN. Robaxin PRN.   - Lidoderm     Pulmonary:   - Supplemental oxygen to keep saturation above 92 %.  - Incentive spirometer every 15- 30 minutes when awake.     Cardiovascular:  #LVAD  - 2x low flow, high PI events, SELVIN 1/2/19 no issues since extubation  - Monitor hemodynamic status.   - Dobutamine 2.5  - MAP > 65.  - PTA amiodarone and digoxin  - Holding metoplolol     GI care:   - Miralax/senna-colace scheulded, suppository PRN  - LFTs downtrending     Fluids/ Electrolytes/ Nutrition:   - TKO  - ADAT    Renal:    - Urine output is adequate so far.  - Will continue to monitor intake and output.  - Goal slight negative     Endocrine:    - Insulin sliding scale  - lantus 10u at bedtime    ID/ Antibiotics:  - Periop antibiotics completed     Heme:     - Anticoagulation: heparin low intensity     Prophylaxis:    - Mechanical prophylaxis for DVT.   - heparin gtt  - PPI     Lines/tubes/drains:  - right IV CVC with [place], arterial line, pivs,   - Chest tubes: med x2, left pleural, pericardial  tee     Disposition:  -  CV ICU, possible transfer to floor tomorrow     Gopal Grimes MD  [id number]  ====================================    TODAY'S PROGRESS:   SUBJECTIVE:   - comfortable overnight, feeling well otherwise.        OBJECTIVE:   1. VITAL SIGNS:   Temp:  [97.5  F (36.4  C)-101.5  F (38.6  C)] 97.5  F (36.4  C)  Heart Rate:  [59-91] 76  Resp:  [13-22] 20  MAP:  [59 mmHg-95 mmHg] 75 mmHg  Arterial Line BP: ()/(47-82) 92/60  FiO2 (%):  [35 %] 35 %  SpO2:  [93 %-100 %] 98 %  Ventilation Mode: CPAP/PS  (Continuous positive airway pressure with Pressure Support)  FiO2 (%): 35 %  Rate Set (breaths/minute): 10 breaths/min  Tidal Volume Set (mL): 500 mL  PEEP (cm H2O): 5 cmH2O  Pressure Support (cm H2O): 7 cmH2O  Oxygen Concentration (%): 30 %  Resp: 20      2. INTAKE/ OUTPUT:   I/O last 3 completed shifts:  In: 2340.41 [P.O.:825; I.V.:815.41; NG/GT:150]  Out: 1394 [Urine:864; Emesis/NG output:150; Chest Tube:380]    3. PHYSICAL EXAMINATION:   General:   Neuro: A&Ox3, NAD  Resp: Breathing non-labored  CV: RRR  Abdomen: Soft, Non-distended, Non-tender  Incisions: c/d/i  Extremities: warm and well perfused, mild edema     4. INVESTIGATIONS:   Arterial Blood Gases   Recent Labs   Lab 01/02/19  1416 01/02/19  1258 01/02/19  0812 01/02/19  0339   PH 7.43 7.44 7.38 7.38   PCO2 36 36 41 45   PO2 115* 107* 283* 127*   HCO3 24 24 24 26     Complete Blood Count   Recent Labs   Lab 01/03/19  0336 01/02/19  1546 01/02/19  0812 01/02/19  0339   WBC 7.1 6.6 10.2 6.9   HGB 8.0* 8.8* 8.5* 8.0*   PLT 92* 96* 107* 104*     Basic Metabolic Panel  Recent Labs   Lab 01/03/19  0336 01/02/19  1546 01/02/19  0812 01/02/19  0339    141 142 141   POTASSIUM 4.6 4.5 5.2 4.9   CHLORIDE 104 110* 111* 110*   CO2 20 23 24 25   BUN 24 20 19 20   CR 1.73* 1.69* 1.79* 1.66*   * 121* 152* 174*     Liver Function Tests  Recent Labs   Lab 01/03/19  0336 01/02/19  1546 01/02/19  0812 01/02/19  0339  01/01/19  0941  01/01/19  0343  12/31/18  1302   * 476* 385* 133*   < > 35 30   < > 32   * 311* 250* 113*   < > 19 17   < > 26   ALKPHOS 51 51 47 41   < > 31* 26*   < > 33*   BILITOTAL 3.6* 3.6* 2.2* 2.4*   < > 2.2* 1.8*   < > 1.4*   ALBUMIN 2.9* 3.2* 3.0* 3.1*   < > 3.1* 2.9*   < > 2.6*   INR  --   --  1.50*  --   --  1.39* 1.46*  --  1.42*    < > = values in this interval not displayed.     Pancreatic Enzymes  No lab results found in last 7 days.  Coagulation Profile  Recent Labs   Lab 01/03/19  0336 01/02/19  0812 01/02/19  0339 01/01/19  0941 01/01/19  0343 12/31/18  1302   INR  --  1.50*  --  1.39* 1.46* 1.42*   PTT 50* 48* 55*  --   --  40*     Lactate  Invalid input(s): LACTATE    5. RADIOLOGY:   No results found for this or any previous visit (from the past 24 hour(s)).    =========================================  Patient seen and examined.Investigations reviewed. Increase pump speed. Wean pressors as tolerated. I agree with the findings outlined in the resident's note. I spent a total of 45 minutes examining the patient, reviewing investigations and therapeutic counseling.

## 2019-01-04 ENCOUNTER — APPOINTMENT (OUTPATIENT)
Dept: GENERAL RADIOLOGY | Facility: CLINIC | Age: 59
End: 2019-01-04
Attending: INTERNAL MEDICINE
Payer: MEDICAID

## 2019-01-04 ENCOUNTER — APPOINTMENT (OUTPATIENT)
Dept: PHYSICAL THERAPY | Facility: CLINIC | Age: 59
End: 2019-01-04
Attending: INTERNAL MEDICINE
Payer: MEDICAID

## 2019-01-04 ENCOUNTER — APPOINTMENT (OUTPATIENT)
Dept: OCCUPATIONAL THERAPY | Facility: CLINIC | Age: 59
End: 2019-01-04
Attending: INTERNAL MEDICINE
Payer: MEDICAID

## 2019-01-04 LAB
ALBUMIN SERPL-MCNC: 2.8 G/DL (ref 3.4–5)
ALBUMIN SERPL-MCNC: 2.8 G/DL (ref 3.4–5)
ALBUMIN SERPL-MCNC: 2.9 G/DL (ref 3.4–5)
ALBUMIN SERPL-MCNC: 2.9 G/DL (ref 3.4–5)
ALP SERPL-CCNC: 50 U/L (ref 40–150)
ALP SERPL-CCNC: 51 U/L (ref 40–150)
ALP SERPL-CCNC: 52 U/L (ref 40–150)
ALP SERPL-CCNC: 53 U/L (ref 40–150)
ALT SERPL W P-5'-P-CCNC: 243 U/L (ref 0–70)
ALT SERPL W P-5'-P-CCNC: 268 U/L (ref 0–70)
ALT SERPL W P-5'-P-CCNC: 269 U/L (ref 0–70)
ALT SERPL W P-5'-P-CCNC: 279 U/L (ref 0–70)
ANION GAP SERPL CALCULATED.3IONS-SCNC: 10 MMOL/L (ref 3–14)
ANION GAP SERPL CALCULATED.3IONS-SCNC: 7 MMOL/L (ref 3–14)
ANION GAP SERPL CALCULATED.3IONS-SCNC: 9 MMOL/L (ref 3–14)
ANION GAP SERPL CALCULATED.3IONS-SCNC: 9 MMOL/L (ref 3–14)
AST SERPL W P-5'-P-CCNC: 112 U/L (ref 0–45)
AST SERPL W P-5'-P-CCNC: 133 U/L (ref 0–45)
AST SERPL W P-5'-P-CCNC: 142 U/L (ref 0–45)
AST SERPL W P-5'-P-CCNC: 82 U/L (ref 0–45)
BASE DEFICIT BLDA-SCNC: 2.1 MMOL/L
BASE DEFICIT BLDA-SCNC: 9.9 MMOL/L
BASE DEFICIT BLDV-SCNC: 0.1 MMOL/L
BASE DEFICIT BLDV-SCNC: 0.6 MMOL/L
BASE DEFICIT BLDV-SCNC: 1.5 MMOL/L
BASE EXCESS BLDV CALC-SCNC: 0 MMOL/L
BASE EXCESS BLDV CALC-SCNC: 1.7 MMOL/L
BILIRUB SERPL-MCNC: 0.7 MG/DL (ref 0.2–1.3)
BILIRUB SERPL-MCNC: 0.9 MG/DL (ref 0.2–1.3)
BILIRUB SERPL-MCNC: 1 MG/DL (ref 0.2–1.3)
BILIRUB SERPL-MCNC: 1.2 MG/DL (ref 0.2–1.3)
BUN SERPL-MCNC: 24 MG/DL (ref 7–30)
BUN SERPL-MCNC: 25 MG/DL (ref 7–30)
BUN SERPL-MCNC: 26 MG/DL (ref 7–30)
BUN SERPL-MCNC: 27 MG/DL (ref 7–30)
CALCIUM SERPL-MCNC: 8 MG/DL (ref 8.5–10.1)
CALCIUM SERPL-MCNC: 8.1 MG/DL (ref 8.5–10.1)
CALCIUM SERPL-MCNC: 8.3 MG/DL (ref 8.5–10.1)
CALCIUM SERPL-MCNC: 8.6 MG/DL (ref 8.5–10.1)
CHLORIDE SERPL-SCNC: 103 MMOL/L (ref 94–109)
CHLORIDE SERPL-SCNC: 104 MMOL/L (ref 94–109)
CO2 SERPL-SCNC: 22 MMOL/L (ref 20–32)
CO2 SERPL-SCNC: 23 MMOL/L (ref 20–32)
CO2 SERPL-SCNC: 24 MMOL/L (ref 20–32)
CO2 SERPL-SCNC: 25 MMOL/L (ref 20–32)
CREAT SERPL-MCNC: 1.36 MG/DL (ref 0.66–1.25)
CREAT SERPL-MCNC: 1.39 MG/DL (ref 0.66–1.25)
CREAT SERPL-MCNC: 1.41 MG/DL (ref 0.66–1.25)
CREAT SERPL-MCNC: 1.44 MG/DL (ref 0.66–1.25)
ERYTHROCYTE [DISTWIDTH] IN BLOOD BY AUTOMATED COUNT: 15.5 % (ref 10–15)
ERYTHROCYTE [DISTWIDTH] IN BLOOD BY AUTOMATED COUNT: 15.6 % (ref 10–15)
ERYTHROCYTE [DISTWIDTH] IN BLOOD BY AUTOMATED COUNT: 15.6 % (ref 10–15)
ERYTHROCYTE [DISTWIDTH] IN BLOOD BY AUTOMATED COUNT: 15.9 % (ref 10–15)
GFR SERPL CREATININE-BSD FRML MDRD: 54 ML/MIN/{1.73_M2}
GFR SERPL CREATININE-BSD FRML MDRD: 55 ML/MIN/{1.73_M2}
GFR SERPL CREATININE-BSD FRML MDRD: 56 ML/MIN/{1.73_M2}
GFR SERPL CREATININE-BSD FRML MDRD: 58 ML/MIN/{1.73_M2}
GLUCOSE BLDC GLUCOMTR-MCNC: 158 MG/DL (ref 70–99)
GLUCOSE BLDC GLUCOMTR-MCNC: 167 MG/DL (ref 70–99)
GLUCOSE BLDC GLUCOMTR-MCNC: 171 MG/DL (ref 70–99)
GLUCOSE BLDC GLUCOMTR-MCNC: 221 MG/DL (ref 70–99)
GLUCOSE BLDC GLUCOMTR-MCNC: 248 MG/DL (ref 70–99)
GLUCOSE SERPL-MCNC: 165 MG/DL (ref 70–99)
GLUCOSE SERPL-MCNC: 184 MG/DL (ref 70–99)
GLUCOSE SERPL-MCNC: 222 MG/DL (ref 70–99)
GLUCOSE SERPL-MCNC: 258 MG/DL (ref 70–99)
HCO3 BLD-SCNC: 14 MMOL/L (ref 21–28)
HCO3 BLD-SCNC: 22 MMOL/L (ref 21–28)
HCO3 BLDV-SCNC: 23 MMOL/L (ref 21–28)
HCO3 BLDV-SCNC: 24 MMOL/L (ref 21–28)
HCO3 BLDV-SCNC: 25 MMOL/L (ref 21–28)
HCO3 BLDV-SCNC: 25 MMOL/L (ref 21–28)
HCO3 BLDV-SCNC: 26 MMOL/L (ref 21–28)
HCT VFR BLD AUTO: 25.1 % (ref 40–53)
HCT VFR BLD AUTO: 26.4 % (ref 40–53)
HCT VFR BLD AUTO: 26.8 % (ref 40–53)
HCT VFR BLD AUTO: 26.9 % (ref 40–53)
HGB BLD-MCNC: 8 G/DL (ref 13.3–17.7)
HGB BLD-MCNC: 8.4 G/DL (ref 13.3–17.7)
HGB BLD-MCNC: 8.5 G/DL (ref 13.3–17.7)
HGB BLD-MCNC: 8.6 G/DL (ref 13.3–17.7)
INR PPP: 1.27 (ref 0.86–1.14)
LACTATE BLD-SCNC: 1 MMOL/L (ref 0.7–2)
LACTATE BLD-SCNC: 1.1 MMOL/L (ref 0.7–2)
LACTATE BLD-SCNC: 1.1 MMOL/L (ref 0.7–2)
LACTATE BLD-SCNC: 1.2 MMOL/L (ref 0.7–2)
LACTATE BLD-SCNC: 2.3 MMOL/L (ref 0.7–2)
LMWH PPP CHRO-ACNC: 0.11 IU/ML
LMWH PPP CHRO-ACNC: 0.16 IU/ML
LMWH PPP CHRO-ACNC: 0.17 IU/ML
MAGNESIUM SERPL-MCNC: 2.1 MG/DL (ref 1.6–2.3)
MCH RBC QN AUTO: 28 PG (ref 26.5–33)
MCH RBC QN AUTO: 28.1 PG (ref 26.5–33)
MCH RBC QN AUTO: 28.3 PG (ref 26.5–33)
MCH RBC QN AUTO: 28.3 PG (ref 26.5–33)
MCHC RBC AUTO-ENTMCNC: 31.6 G/DL (ref 31.5–36.5)
MCHC RBC AUTO-ENTMCNC: 31.8 G/DL (ref 31.5–36.5)
MCHC RBC AUTO-ENTMCNC: 31.9 G/DL (ref 31.5–36.5)
MCHC RBC AUTO-ENTMCNC: 32.1 G/DL (ref 31.5–36.5)
MCV RBC AUTO: 88 FL (ref 78–100)
MCV RBC AUTO: 89 FL (ref 78–100)
O2/TOTAL GAS SETTING VFR VENT: 21 %
OXYHGB MFR BLD: 92 % (ref 92–100)
OXYHGB MFR BLD: 95 % (ref 92–100)
OXYHGB MFR BLDV: 48 %
OXYHGB MFR BLDV: 49 %
OXYHGB MFR BLDV: 49 %
OXYHGB MFR BLDV: 54 %
OXYHGB MFR BLDV: 56 %
PCO2 BLD: 21 MM HG (ref 35–45)
PCO2 BLD: 31 MM HG (ref 35–45)
PCO2 BLDV: 37 MM HG (ref 40–50)
PCO2 BLDV: 38 MM HG (ref 40–50)
PCO2 BLDV: 38 MM HG (ref 40–50)
PCO2 BLDV: 39 MM HG (ref 40–50)
PCO2 BLDV: 40 MM HG (ref 40–50)
PH BLD: 7.42 PH (ref 7.35–7.45)
PH BLD: 7.45 PH (ref 7.35–7.45)
PH BLDV: 7.4 PH (ref 7.32–7.43)
PH BLDV: 7.41 PH (ref 7.32–7.43)
PH BLDV: 7.44 PH (ref 7.32–7.43)
PHOSPHATE SERPL-MCNC: 2.6 MG/DL (ref 2.5–4.5)
PLATELET # BLD AUTO: 112 10E9/L (ref 150–450)
PLATELET # BLD AUTO: 113 10E9/L (ref 150–450)
PLATELET # BLD AUTO: 125 10E9/L (ref 150–450)
PLATELET # BLD AUTO: 127 10E9/L (ref 150–450)
PO2 BLD: 69 MM HG (ref 80–105)
PO2 BLD: 86 MM HG (ref 80–105)
PO2 BLDV: 27 MM HG (ref 25–47)
PO2 BLDV: 28 MM HG (ref 25–47)
PO2 BLDV: 28 MM HG (ref 25–47)
PO2 BLDV: 29 MM HG (ref 25–47)
PO2 BLDV: 30 MM HG (ref 25–47)
POTASSIUM BLD-SCNC: 2.2 MMOL/L (ref 3.4–5.3)
POTASSIUM SERPL-SCNC: 3.6 MMOL/L (ref 3.4–5.3)
POTASSIUM SERPL-SCNC: 3.8 MMOL/L (ref 3.4–5.3)
POTASSIUM SERPL-SCNC: 4.1 MMOL/L (ref 3.4–5.3)
POTASSIUM SERPL-SCNC: 4.2 MMOL/L (ref 3.4–5.3)
PROT SERPL-MCNC: 5.8 G/DL (ref 6.8–8.8)
PROT SERPL-MCNC: 6.1 G/DL (ref 6.8–8.8)
RBC # BLD AUTO: 2.83 10E12/L (ref 4.4–5.9)
RBC # BLD AUTO: 2.97 10E12/L (ref 4.4–5.9)
RBC # BLD AUTO: 3.04 10E12/L (ref 4.4–5.9)
RBC # BLD AUTO: 3.06 10E12/L (ref 4.4–5.9)
SODIUM SERPL-SCNC: 136 MMOL/L (ref 133–144)
SODIUM SERPL-SCNC: 137 MMOL/L (ref 133–144)
WBC # BLD AUTO: 5 10E9/L (ref 4–11)
WBC # BLD AUTO: 5.1 10E9/L (ref 4–11)
WBC # BLD AUTO: 5.1 10E9/L (ref 4–11)
WBC # BLD AUTO: 5.8 10E9/L (ref 4–11)

## 2019-01-04 PROCEDURE — 84100 ASSAY OF PHOSPHORUS: CPT | Performed by: SURGERY

## 2019-01-04 PROCEDURE — 83735 ASSAY OF MAGNESIUM: CPT | Performed by: SURGERY

## 2019-01-04 PROCEDURE — 97161 PT EVAL LOW COMPLEX 20 MIN: CPT | Mod: GP

## 2019-01-04 PROCEDURE — 71045 X-RAY EXAM CHEST 1 VIEW: CPT

## 2019-01-04 PROCEDURE — 83605 ASSAY OF LACTIC ACID: CPT | Performed by: THORACIC SURGERY (CARDIOTHORACIC VASCULAR SURGERY)

## 2019-01-04 PROCEDURE — 25000128 H RX IP 250 OP 636: Performed by: STUDENT IN AN ORGANIZED HEALTH CARE EDUCATION/TRAINING PROGRAM

## 2019-01-04 PROCEDURE — 83605 ASSAY OF LACTIC ACID: CPT | Performed by: INTERNAL MEDICINE

## 2019-01-04 PROCEDURE — 25000128 H RX IP 250 OP 636: Performed by: SURGERY

## 2019-01-04 PROCEDURE — 00000146 ZZHCL STATISTIC GLUCOSE BY METER IP

## 2019-01-04 PROCEDURE — 20000004 ZZH R&B ICU UMMC

## 2019-01-04 PROCEDURE — 25000132 ZZH RX MED GY IP 250 OP 250 PS 637: Performed by: THORACIC SURGERY (CARDIOTHORACIC VASCULAR SURGERY)

## 2019-01-04 PROCEDURE — 74018 RADEX ABDOMEN 1 VIEW: CPT

## 2019-01-04 PROCEDURE — 85610 PROTHROMBIN TIME: CPT | Performed by: SURGERY

## 2019-01-04 PROCEDURE — 40000014 ZZH STATISTIC ARTERIAL MONITORING DAILY

## 2019-01-04 PROCEDURE — 25000132 ZZH RX MED GY IP 250 OP 250 PS 637: Performed by: SURGERY

## 2019-01-04 PROCEDURE — 82805 BLOOD GASES W/O2 SATURATION: CPT | Performed by: THORACIC SURGERY (CARDIOTHORACIC VASCULAR SURGERY)

## 2019-01-04 PROCEDURE — 40000048 ZZH STATISTIC DAILY SWAN MONITORING

## 2019-01-04 PROCEDURE — 97110 THERAPEUTIC EXERCISES: CPT | Mod: GP

## 2019-01-04 PROCEDURE — 83605 ASSAY OF LACTIC ACID: CPT | Performed by: SURGERY

## 2019-01-04 PROCEDURE — 82805 BLOOD GASES W/O2 SATURATION: CPT | Performed by: SURGERY

## 2019-01-04 PROCEDURE — 40000133 ZZH STATISTIC OT WARD VISIT

## 2019-01-04 PROCEDURE — 85520 HEPARIN ASSAY: CPT | Performed by: SURGERY

## 2019-01-04 PROCEDURE — 40000275 ZZH STATISTIC RCP TIME EA 10 MIN

## 2019-01-04 PROCEDURE — 97535 SELF CARE MNGMENT TRAINING: CPT | Mod: GO

## 2019-01-04 PROCEDURE — 82805 BLOOD GASES W/O2 SATURATION: CPT | Performed by: STUDENT IN AN ORGANIZED HEALTH CARE EDUCATION/TRAINING PROGRAM

## 2019-01-04 PROCEDURE — 82805 BLOOD GASES W/O2 SATURATION: CPT | Performed by: INTERNAL MEDICINE

## 2019-01-04 PROCEDURE — 97530 THERAPEUTIC ACTIVITIES: CPT | Mod: GP

## 2019-01-04 PROCEDURE — 25000132 ZZH RX MED GY IP 250 OP 250 PS 637

## 2019-01-04 PROCEDURE — 80053 COMPREHEN METABOLIC PANEL: CPT | Performed by: SURGERY

## 2019-01-04 PROCEDURE — 40000196 ZZH STATISTIC RAPCV CVP MONITORING

## 2019-01-04 PROCEDURE — 85027 COMPLETE CBC AUTOMATED: CPT | Performed by: SURGERY

## 2019-01-04 PROCEDURE — 99233 SBSQ HOSP IP/OBS HIGH 50: CPT | Mod: GC | Performed by: INTERNAL MEDICINE

## 2019-01-04 PROCEDURE — 25000128 H RX IP 250 OP 636: Performed by: THORACIC SURGERY (CARDIOTHORACIC VASCULAR SURGERY)

## 2019-01-04 PROCEDURE — 97110 THERAPEUTIC EXERCISES: CPT | Mod: GO

## 2019-01-04 PROCEDURE — 40000193 ZZH STATISTIC PT WARD VISIT

## 2019-01-04 RX ORDER — WARFARIN SODIUM 2 MG/1
2 TABLET ORAL
Status: COMPLETED | OUTPATIENT
Start: 2019-01-04 | End: 2019-01-04

## 2019-01-04 RX ORDER — HYDRALAZINE HYDROCHLORIDE 20 MG/ML
10 INJECTION INTRAMUSCULAR; INTRAVENOUS EVERY 30 MIN PRN
Status: DISCONTINUED | OUTPATIENT
Start: 2019-01-04 | End: 2019-01-09

## 2019-01-04 RX ORDER — FUROSEMIDE 10 MG/ML
20 INJECTION INTRAMUSCULAR; INTRAVENOUS ONCE
Status: COMPLETED | OUTPATIENT
Start: 2019-01-04 | End: 2019-01-04

## 2019-01-04 RX ORDER — HEPARIN SODIUM,PORCINE 10 UNIT/ML
2-5 VIAL (ML) INTRAVENOUS
Status: COMPLETED | OUTPATIENT
Start: 2019-01-04 | End: 2019-01-09

## 2019-01-04 RX ORDER — LIDOCAINE 40 MG/G
CREAM TOPICAL
Status: DISCONTINUED | OUTPATIENT
Start: 2019-01-04 | End: 2019-01-11 | Stop reason: HOSPADM

## 2019-01-04 RX ADMIN — HEPARIN SODIUM 1350 UNITS/HR: 10000 INJECTION, SOLUTION INTRAVENOUS at 05:29

## 2019-01-04 RX ADMIN — OXYCODONE HYDROCHLORIDE 5 MG: 5 TABLET ORAL at 21:54

## 2019-01-04 RX ADMIN — LIDOCAINE 2 PATCH: 560 PATCH PERCUTANEOUS; TOPICAL; TRANSDERMAL at 08:15

## 2019-01-04 RX ADMIN — WARFARIN SODIUM 2 MG: 2 TABLET ORAL at 17:28

## 2019-01-04 RX ADMIN — ACETAMINOPHEN 650 MG: 325 TABLET, FILM COATED ORAL at 21:54

## 2019-01-04 RX ADMIN — SENNOSIDES AND DOCUSATE SODIUM 2 TABLET: 8.6; 5 TABLET ORAL at 07:59

## 2019-01-04 RX ADMIN — INSULIN ASPART 1 UNITS: 100 INJECTION, SOLUTION INTRAVENOUS; SUBCUTANEOUS at 08:19

## 2019-01-04 RX ADMIN — FUROSEMIDE 20 MG: 10 INJECTION, SOLUTION INTRAVENOUS at 15:53

## 2019-01-04 RX ADMIN — INSULIN ASPART 5 UNITS: 100 INJECTION, SOLUTION INTRAVENOUS; SUBCUTANEOUS at 17:27

## 2019-01-04 RX ADMIN — POTASSIUM CHLORIDE 20 MEQ: 750 TABLET, EXTENDED RELEASE ORAL at 18:53

## 2019-01-04 RX ADMIN — POLYETHYLENE GLYCOL 3350 17 G: 17 POWDER, FOR SOLUTION ORAL at 08:19

## 2019-01-04 RX ADMIN — AMIODARONE HYDROCHLORIDE 200 MG: 200 TABLET ORAL at 08:00

## 2019-01-04 RX ADMIN — HEPARIN SODIUM 1350 UNITS/HR: 10000 INJECTION, SOLUTION INTRAVENOUS at 10:59

## 2019-01-04 RX ADMIN — ONDANSETRON 4 MG: 2 INJECTION INTRAMUSCULAR; INTRAVENOUS at 09:45

## 2019-01-04 RX ADMIN — HYDRALAZINE HYDROCHLORIDE 10 MG: 20 INJECTION INTRAMUSCULAR; INTRAVENOUS at 21:42

## 2019-01-04 RX ADMIN — MUPIROCIN 1 G: 20 OINTMENT TOPICAL at 08:21

## 2019-01-04 RX ADMIN — ACETAMINOPHEN 650 MG: 325 TABLET, FILM COATED ORAL at 05:31

## 2019-01-04 RX ADMIN — ASPIRIN 81 MG 81 MG: 81 TABLET ORAL at 07:59

## 2019-01-04 RX ADMIN — Medication 1 MG: at 21:54

## 2019-01-04 RX ADMIN — OXYCODONE HYDROCHLORIDE 10 MG: 5 TABLET ORAL at 01:17

## 2019-01-04 RX ADMIN — HYDRALAZINE HYDROCHLORIDE 10 MG: 20 INJECTION INTRAMUSCULAR; INTRAVENOUS at 18:16

## 2019-01-04 RX ADMIN — DOBUTAMINE HYDROCHLORIDE 2.5 MCG/KG/MIN: 200 INJECTION INTRAVENOUS at 05:31

## 2019-01-04 RX ADMIN — PANTOPRAZOLE SODIUM 40 MG: 40 TABLET, DELAYED RELEASE ORAL at 08:00

## 2019-01-04 RX ADMIN — ACETAMINOPHEN 650 MG: 325 TABLET, FILM COATED ORAL at 11:43

## 2019-01-04 RX ADMIN — INSULIN ASPART 4 UNITS: 100 INJECTION, SOLUTION INTRAVENOUS; SUBCUTANEOUS at 13:15

## 2019-01-04 RX ADMIN — DIGOXIN 125 MCG: 125 TABLET ORAL at 08:00

## 2019-01-04 RX ADMIN — POTASSIUM CHLORIDE 20 MEQ: 750 TABLET, EXTENDED RELEASE ORAL at 23:13

## 2019-01-04 ASSESSMENT — ACTIVITIES OF DAILY LIVING (ADL)
ADLS_ACUITY_SCORE: 17

## 2019-01-04 ASSESSMENT — MIFFLIN-ST. JEOR: SCORE: 1506.22

## 2019-01-04 NOTE — PROGRESS NOTES
Cardiac ICU PROGRESS NOTE  January 4, 2019     CO-MORBIDITIES:   Chronic systolic heart failure (H)  (primary encounter diagnosis)  STEMI  ICD  DM     ASSESSMENT: Mariusz Sharp is a 58 year old male now s/p LVAD (HM 3) placement with Dr. Baker on 12/31/2018 for a history of ischemic cardiomyopathy with reduced ejection fraction. There were no major intraoperative events to report and the patient was able to be liberated from cardiopulmonary bypass without significant difficulty. Concern regarding low flow states prompted keeping the patient intubated on 1/1 and on 1/2 following a SELVIN, the patient was able to be extubated. Elevated MAPs required initiating afterload reduction on 1/3.      TODAY'S PROGRESS:   - low intensity heparin, warfarin   - lantus 10 at bedtime, will escalate if diet is improving.   - hydralazine PRN for MAP > 90  - out of bed, ambulate, PT/OT  - swan ana per cardiology  - keep dobutamine  - abd x-ray to evaluate abdominal distention for ileus  - abg x 1       PLAN:  Neuro/pain/sedation:  - Monitor neurological status. Notify the MD for any acute changes in exam.  - TylenolPRN. Oxy/dil PRN. Robaxin PRN.   - Lidoderm     Pulmonary:   - Supplemental oxygen to keep saturation above 92 %.  - Incentive spirometer every 15- 30 minutes when awake.  - abg x 1      Cardiovascular:  #LVAD  - 2x low flow, high PI events, SELVIN 1/2/19 no issues since extubation  - Monitor hemodynamic status.   - Dobutamine 2.5  - MAP > 65.  - PTA amiodarone and digoxin  - Holding metoplolol  - PRN hydralazine for MAP > 90     GI care:   - Miralax/senna-colace scheulded, suppository PRN  - LFTs downtrending overall     Fluids/ Electrolytes/ Nutrition:   - TKO  - ADAT    Renal:    - Urine output is adequate so far.  - Will continue to monitor intake and output.  - Goal slight negative     Endocrine:    - Insulin sliding scale  - lantus 10u at bedtime     ID/ Antibiotics:  - Periop antibiotics completed     Heme:     -  Anticoagulation: heparin low intensity  - warfarin started yesterday      Prophylaxis:    - Mechanical prophylaxis for DVT.   - heparin gtt, warfarin   - PPI     Lines/tubes/drains:  - right IV CVC with [place], arterial line, pivs,   - Chest tubes: med x2, left pleural, pericardial tee     Disposition:  -  CV ICU, TTF once lines out     Gopal Grimes MD  [id number]                ====================================     TODAY'S PROGRESS:   SUBJECTIVE:   - no acute issues overnight, feeling some abdominal distention.           OBJECTIVE:   1. VITAL SIGNS:   Temp:  [98.2  F (36.8  C)-98.9  F (37.2  C)] 98.4  F (36.9  C)  Heart Rate:  [75-96] 82  Resp:  [16-25] 16  MAP:  [69 mmHg-156 mmHg] 69 mmHg  Arterial Line BP: ()/() 78/58  SpO2:  [90 %-98 %] 93 %  Resp: 16       2. INTAKE/ OUTPUT:   I/O last 3 completed shifts:  In: 1511.23 [P.O.:800; I.V.:711.23]  Out: 2290 [Urine:1900; Chest Tube:390]     3. PHYSICAL EXAMINATION:   General:   Neuro: A&Ox3, NAD  Resp: Breathing non-labored  CV: regular rate   Abdomen: Soft, Non-distended, Non-tender  Incisions: c/d/i  Extremities: warm and well perfused, trace edema      4. INVESTIGATIONS:   Arterial Blood Gases          Recent Labs   Lab 01/02/19  1416 01/02/19  1258 01/02/19  0812 01/02/19  0339   PH 7.43 7.44 7.38 7.38   PCO2 36 36 41 45   PO2 115* 107* 283* 127*   HCO3 24 24 24 26      Complete Blood Count          Recent Labs   Lab 01/04/19  0356 01/03/19  2040 01/03/19  1227 01/03/19  0959   WBC 5.8 6.4 7.1 6.7   HGB 8.4* 8.7* 8.4* 8.1*   * 101* 107* 98*      Basic Metabolic Panel         Recent Labs   Lab 01/04/19  0356 01/03/19 2040 01/03/19 1227 01/03/19  0959    137 139 137   POTASSIUM 4.2 4.2 4.0 4.6   CHLORIDE 104 105 108 105   CO2 22 20 19* 22   BUN 26 26 23 23   CR 1.36* 1.32* 1.40* 1.61*   * 183* 145* 143*      Liver Function Tests             Recent Labs   Lab 01/04/19  0356 01/03/19 2040 01/03/19  1227 01/03/19  0959    01/02/19  0812   01/01/19  0941   * 163* 183* 195*   < > 385*   < > 35   * 266* 252* 266*   < > 250*   < > 19   ALKPHOS 51 53 49 50   < > 47   < > 31*   BILITOTAL 1.2 1.6* 2.3* 2.6*   < > 2.2*   < > 2.2*   ALBUMIN 2.9* 3.0* 2.7* 2.9*   < > 3.0*   < > 3.1*   INR 1.27*  --   --  1.38*  --  1.50*  --  1.39*    < > = values in this interval not displayed.      Pancreatic Enzymes  No lab results found in last 7 days.  Coagulation Profile             Recent Labs   Lab 01/04/19  0356 01/03/19  0959 01/03/19  0336 01/02/19  0812 01/02/19  0339 01/01/19  0941   12/31/18  1302   INR 1.27* 1.38*  --  1.50*  --  1.39*   < > 1.42*   PTT  --   --  50* 48* 55*  --   --  40*    < > = values in this interval not displayed.      Lactate  Invalid input(s): LACTATE     5. RADIOLOGY:       Recent Results (from the past 24 hour(s))   XR Chest Port 1 View     Narrative     XR CHEST PORT 1 VW 1/4/2019 12:30 AM     History: s/p LVAD and extubation     Comparison: 1/3/2019     Findings: Single AP view of the chest. Stable position of the right IJ  [place]-Calvin catheter, with the tip projecting over the right main  pulmonary artery. Stable left chest wall automatic implantable cardiac  defibrillator and leads. Mediastinal drains. Stable position of the  LVAD. Cardiomegaly, bilateral pleural effusions, and bibasilar  opacities are largely unchanged.        Impression     Impression:   1. Stable support devices.  2. Stable bibasilar atelectasis/consolidation and bilateral pleural  effusions.     I have personally reviewed the examination and initial interpretation  and I agree with the findings.     CORINNA PERAZA MD         =========================================

## 2019-01-04 NOTE — PROGRESS NOTES
01/04/19 0839   Quick Adds   Type of Visit Initial PT Evaluation   Living Environment   Lives With parent(s)   Living Arrangements house   Home Accessibility stairs to enter home   Living Environment Comment 5-6 stairs to enter home, all needs met on main level. Pt has walk in shower and built in bench. Primary caregiver to father (no physical assistance, only assisting with meds & meal prep). Pt brother assisting caregiving for father while pt hospitalized.    Self-Care   Usual Activity Tolerance good   Equipment Currently Used at Home none   Activity/Exercise/Self-Care Comment indep with self cares, active with jogging and biking    Functional Level Prior   Ambulation 0-->independent   Transferring 0-->independent   Toileting 0-->independent   Bathing 0-->independent   Communication 0-->understands/communicates without difficulty   Swallowing 0-->swallows foods/liquids without difficulty   Cognition 0 - no cognition issues reported   Fall history within last six months no   Which of the above functional risks had a recent onset or change? ambulation;transferring;toileting   Prior Functional Level Comment pt indep with functional mobility and ADLs, active   General Information   Onset of Illness/Injury or Date of Surgery - Date 12/29/18   Referring Physician Jenni Ortiz MD   Patient/Family Goals Statement to get back to biking   Pertinent History of Current Problem (include personal factors and/or comorbidities that impact the POC) Mariusz Sharp is a 58 year old male now s/p LVAD (HM 3) placement with Dr. Baker on 12/31/2018 for a history of ischemic cardiomyopathy with reduced ejection fraction.   Precautions/Limitations sternal precautions   General Observations chest tubes, swan, ART line, LVAD   Cognitive Status Examination   Orientation orientation to person, place and time   Level of Consciousness alert   Follows Commands and Answers Questions 100% of the time;able to follow multistep instructions    Personal Safety and Judgment intact   Memory intact   Pain Assessment   Patient Currently in Pain No   Integumentary/Edema   Integumentary/Edema no deficits were identifed   Integumentary/Edema Comments Pt reports feeling like feet have fluid but edema not noted   Posture    Posture Not impaired   Range of Motion (ROM)   ROM Quick Adds No deficits were identified   Strength   Strength Comments Fair functional strength as noted by level of assist with bed mobility, transfers   Bed Mobility   Bed Mobility Comments mod A x2 for sup to sitting   Transfer Skills   Transfer Comments max A x1 for sit <> stand   Gait   Gait Comments not assessed this day    Balance   Balance Comments standing static balance good   Coordination   Coordination no deficits were identified   Muscle Tone   Muscle Tone no deficits were identified   General Therapy Interventions   Planned Therapy Interventions balance training;bed mobility training;gait training;neuromuscular re-education;strengthening;transfer training;risk factor education;home program guidelines;progressive activity/exercise   Clinical Impression   Criteria for Skilled Therapeutic Intervention yes, treatment indicated   PT Diagnosis impaired functional mobility   Influenced by the following impairments post-op weakness, decreased activity tolerance   Functional limitations due to impairments decreased indep with functional mobility   Clinical Presentation Stable/Uncomplicated   Clinical Presentation Rationale level of assist, medical stability   Clinical Decision Making (Complexity) Low complexity   Therapy Frequency` other (see comments)  (6x/week)   Predicted Duration of Therapy Intervention (days/wks) 2 weeks   Anticipated Equipment Needs at Discharge (none anticipated)   Anticipated Discharge Disposition Home with Home Therapy;Home with Outpatient Therapy  (TBD)   Risk & Benefits of therapy have been explained Yes   Patient, Family & other staff in agreement with plan of  "care Yes   [facility] AM-PAC TM \"6 Clicks\"   2016, Trustees of [facility], under license to [email or web address].  All rights reserved.   6 Clicks Short Forms Basic Mobility Inpatient Short Form   [facility]-PAC  \"6 Clicks\" V.2 Basic Mobility Inpatient Short Form   1. Turning from your back to your side while in a flat bed without using bedrails? 4 - None   2. Moving from lying on your back to sitting on the side of a flat bed without using bedrails? 2 - A Lot   3. Moving to and from a bed to a chair (including a wheelchair)? 2 - A Lot   4. Standing up from a chair using your arms (e.g., wheelchair, or bedside chair)? 2 - A Lot   5. To walk in hospital room? 2 - A Lot   6. Climbing 3-5 steps with a railing? 1 - Total   Basic Mobility Raw Score (Score out of 24.Lower scores equate to lower levels of function) 13   Total Evaluation Time   Total Evaluation Time (Minutes) 10     "

## 2019-01-04 NOTE — PROGRESS NOTES
ADVANCED HEART FAILURE PROGRESS NOTE    SUBJECTIVE:  No acute events. Feeling a little nauseated this morning. Breathing well. No VAD alarms.    ROS otherwise negative.    OBJECTIVE:  Vital signs:  Temp: 98.2  F (36.8  C) Temp  Min: 98.2  F (36.8  C)  Max: 99  F (37.2  C)  Heart Rate: 82 Heart Rate  Min: 75  Max: 96    No data recorded.  No data recorded.    Resp: 18 Resp  Min: 18  Max: 25  SpO2: 90 % SpO2  Min: 90 %  Max: 99 %    [place]: RA 16, PA 38/20, PCW 12    HM3: 5200 RPMs, 3.3-3.7 LPM, PI 3-7      Intake/Output Summary (Last 24 hours) at 1/4/2019 0645  Last data filed at 1/4/2019 0600  Gross per 24 hour   Intake 1511.23 ml   Output 2290 ml   Net -778.77 ml       Vitals:    01/02/19 0330 01/03/19 0400 01/04/19 0500   Weight: 84 kg (185 lb 3 oz) 77 kg (169 lb 11.2 oz) 76.5 kg (168 lb 11.2 oz)         Physical Exam:  Gen: NAD  HEENT: minimally elevated JVP  Resp: coarse bilaterally, no clear wheezes  CVS: normal rate, VAD hum, no JVD  Abdo: soft, nontender  Extremities: warm, no edema   Neuro: non focal      Medications:    IV fluid REPLACEMENT ONLY       DOBUTamine 2.5 mcg/kg/min (01/04/19 0700)     HEParin 1,350 Units/hr (01/04/19 0700)     - MEDICATION INSTRUCTIONS -       BETA BLOCKER NOT PRESCRIBED       Warfarin Therapy Reminder         Current Facility-Administered Medications   Medication Dose Route Frequency     amiodarone  200 mg Oral Daily     aspirin  81 mg Oral Daily     digoxin  125 mcg Oral Daily     furosemide  20 mg Intravenous Once     influenza vaccine adult (product based on age)  0.5 mL Intramuscular Prior to discharge     insulin aspart  1-10 Units Subcutaneous TID AC     insulin aspart  1-7 Units Subcutaneous At Bedtime     insulin glargine  20 Units Subcutaneous At Bedtime     lidocaine  2 patch Transdermal Q24H     lidocaine   Transdermal Q8H     lidocaine   Transdermal Q24h     mupirocin  1 g Both Nostrils BID     pantoprazole  40 mg Oral QAM AC     polyethylene glycol  17 g Oral BID      senna-docusate  1 tablet Oral BID    Or     senna-docusate  2 tablet Oral BID     sodium chloride (PF)  10 mL Intravenous Once     sodium chloride (PF)  3 mL Intracatheter Q8H     sodium chloride (PF)  3 mL Intracatheter Q8H     warfarin  2 mg Oral ONCE at 18:00         Labs:  CMP  Recent Labs   Lab 01/04/19  0356 01/03/19 2040 01/02/19  0812  12/30/18 2007    137   < > 142   < > 137   POTASSIUM 4.2 4.2   < > 5.2   < > 3.6   CHLORIDE 104 105   < > 111*   < > 102   CO2 22 20   < > 24   < > 28   BUN 26 26   < > 19   < > 40*   CR 1.36* 1.32*   < > 1.79*   < > 1.67*   ARLENE 8.3* 8.7   < > 8.2*   < > 8.5   MAG 2.1 2.1   < >  --    < >  --    PHOS 2.6 2.9   < >  --    < >  --    * 183*   < > 152*   < > 280*   LDH  --   --   --  443*  --  145   ALKPHOS 51 53   < > 47   < > 43   BILITOTAL 1.2 1.6*   < > 2.2*   < > 0.7   * 163*   < > 385*   < > 20   * 266*   < > 250*   < > 31    < > = values in this interval not displayed.     BLOOD GAS  Recent Labs   Lab 01/02/19  1416 01/02/19  1258   PH 7.43 7.44   PCO2 36 36   PO2 115* 107*     CBC  Recent Labs   Lab 01/04/19  0356 01/03/19 2040   WBC 5.8 6.4   HGB 8.4* 8.7*   HCT 26.4* 27.3*   * 101*     COAG  Recent Labs   Lab 01/04/19  0356 01/03/19  0959 01/03/19  0336 01/02/19  0812   INR 1.27* 1.38*  --  1.50*   PTT  --   --  50* 48*         ASSESSMENT/PLAN:  58 year old male with a history of ICM, CAD (s/p STEMI with ALYSSA to LAD 6/27/18), monomorphic VT, LV thrombus, CKD Stage III, paroxysmal AF, DM Type II who was admitted for placement of LVAD. Now s/p HM3 LVAD 12/31.     HeartMate III LVAD placed 12/31:  - speed 5200 RPM, will increase as tolerated  - on ASA 81  - on heparin drip, starting warfarin  - on dobutamine 2.5 for RV support  - recommend diuresis today with rising CVP  - SELVIN showed moderate-severe RV dysfunction, LV unloaded with inflow cannula in good position     Ischemic cardiomyopathy, CAD:  - ALYSSA to LAD 6/27/2018  - was not on  ACE-I due to CKD  - holding BB after LVAD  - continue rosuvastatin  - was on plavix, holding now after surgery     Paroxysmal afib:  - currently in sinus rhythm  - holding BB after LVAD  - starting heparin drip     CKD  - stable with good urine output      Seen and staffed with Dr. Naga Romo MD  Advanced Heart Failure Fellow  [phone]

## 2019-01-04 NOTE — PLAN OF CARE
Discharge Planner PT   Patient plan for discharge: none expressed  Current status: patient needs mod A x2 sup to sitting EOB. Sit <> stands x3 with mod A x1-2. Patient limited by dizziness during session.   Barriers to return to prior living situation: level of assist, medical stability  Recommendations for discharge: anticipate will be able to discharge home with home PT vs OP CR; will update recs as appropriate  Rationale for recommendations: pt would benefit from continued skilled inpatient PT to address indep with functional mobility       Entered by: Caitlyn Lauren 01/04/2019 9:25 AM

## 2019-01-04 NOTE — PROGRESS NOTES
CVTS PROGRESS NOTE  January 4, 2019    CO-MORBIDITIES:   Chronic systolic heart failure (H)  (primary encounter diagnosis)  STEMI  ICD  DM     ASSESSMENT: Mariusz Sharp is a 58 year old male now s/p LVAD (HM 3) placement with Dr. Baker on 12/31/2018 for a history of ischemic cardiomyopathy with reduced ejection fraction. There were no major intraoperative events to report and the patient was able to be liberated from cardiopulmonary bypass without significant difficulty. Concern regarding low flow states prompted keeping the patient intubated on 1/1 and on 1/2 following a SELVIN, the patient was able to be extubated. Elevated MAPs required initiating afterload reduction on 1/3.      TODAY'S PROGRESS:   - low intensity heparin, warfarin   - lantus 10 at bedtime, will escalate if diet is improving.   - hydralazine PRN for MAP > 90  - out of bed, ambulate, PT/OT  - swan ana per cardiology  - keep dobutamine  - abd x-ray to evaluate abdominal distention for ileus  - abg x 1      PLAN:  Neuro/pain/sedation:  - Monitor neurological status. Notify the MD for any acute changes in exam.  - TylenolPRN. Oxy/dil PRN. Robaxin PRN.   - Lidoderm     Pulmonary:   - Supplemental oxygen to keep saturation above 92 %.  - Incentive spirometer every 15- 30 minutes when awake.  - abg x 1      Cardiovascular:  #LVAD  - 2x low flow, high PI events, SELVIN 1/2/19 no issues since extubation  - Monitor hemodynamic status.   - Dobutamine 2.5  - MAP > 65.  - PTA amiodarone and digoxin  - Holding metoplolol  - PRN hydralazine for MAP > 90     GI care:   - Miralax/senna-colace scheulded, suppository PRN  - LFTs downtrending overall     Fluids/ Electrolytes/ Nutrition:   - TKO  - ADAT    Renal:    - Urine output is adequate so far.  - Will continue to monitor intake and output.  - Goal slight negative     Endocrine:    - Insulin sliding scale  - lantus 10u at bedtime     ID/ Antibiotics:  - Periop antibiotics completed     Heme:     -  Anticoagulation: heparin low intensity  - warfarin started yesterday      Prophylaxis:    - Mechanical prophylaxis for DVT.   - heparin gtt, warfarin   - PPI     Lines/tubes/drains:  - right IV CVC with [place], arterial line, pivs,   - Chest tubes: med x2, left pleural, pericardial tee     Disposition:  -  CV ICU, TTF once lines out     Gopal Grimes MD  [id number]     ====================================    TODAY'S PROGRESS:   SUBJECTIVE:   - no acute issues overnight, feeling some abdominal distention.         OBJECTIVE:   1. VITAL SIGNS:   Temp:  [98.2  F (36.8  C)-98.9  F (37.2  C)] 98.4  F (36.9  C)  Heart Rate:  [75-96] 82  Resp:  [16-25] 16  MAP:  [69 mmHg-156 mmHg] 69 mmHg  Arterial Line BP: ()/() 78/58  SpO2:  [90 %-98 %] 93 %  Resp: 16      2. INTAKE/ OUTPUT:   I/O last 3 completed shifts:  In: 1511.23 [P.O.:800; I.V.:711.23]  Out: 2290 [Urine:1900; Chest Tube:390]    3. PHYSICAL EXAMINATION:   General:   Neuro: A&Ox3, NAD  Resp: Breathing non-labored  CV: regular rate   Abdomen: Soft, Non-distended, Non-tender  Incisions: c/d/i  Extremities: warm and well perfused, trace edema     4. INVESTIGATIONS:   Arterial Blood Gases   Recent Labs   Lab 01/02/19  1416 01/02/19  1258 01/02/19  0812 01/02/19  0339   PH 7.43 7.44 7.38 7.38   PCO2 36 36 41 45   PO2 115* 107* 283* 127*   HCO3 24 24 24 26     Complete Blood Count   Recent Labs   Lab 01/04/19  0356 01/03/19  2040 01/03/19  1227 01/03/19  0959   WBC 5.8 6.4 7.1 6.7   HGB 8.4* 8.7* 8.4* 8.1*   * 101* 107* 98*     Basic Metabolic Panel  Recent Labs   Lab 01/04/19  0356 01/03/19 2040 01/03/19  1227 01/03/19  0959    137 139 137   POTASSIUM 4.2 4.2 4.0 4.6   CHLORIDE 104 105 108 105   CO2 22 20 19* 22   BUN 26 26 23 23   CR 1.36* 1.32* 1.40* 1.61*   * 183* 145* 143*     Liver Function Tests  Recent Labs   Lab 01/04/19  0356 01/03/19  2040 01/03/19  1227 01/03/19  0959  01/02/19  0812  01/01/19  0941   * 163* 183* 195*   < >  385*   < > 35   * 266* 252* 266*   < > 250*   < > 19   ALKPHOS 51 53 49 50   < > 47   < > 31*   BILITOTAL 1.2 1.6* 2.3* 2.6*   < > 2.2*   < > 2.2*   ALBUMIN 2.9* 3.0* 2.7* 2.9*   < > 3.0*   < > 3.1*   INR 1.27*  --   --  1.38*  --  1.50*  --  1.39*    < > = values in this interval not displayed.     Pancreatic Enzymes  No lab results found in last 7 days.  Coagulation Profile  Recent Labs   Lab 01/04/19  0356 01/03/19  0959 01/03/19  0336 01/02/19  0812 01/02/19  0339 01/01/19  0941  12/31/18  1302   INR 1.27* 1.38*  --  1.50*  --  1.39*   < > 1.42*   PTT  --   --  50* 48* 55*  --   --  40*    < > = values in this interval not displayed.     Lactate  Invalid input(s): LACTATE    5. RADIOLOGY:   Recent Results (from the past 24 hour(s))   XR Chest Port 1 View    Narrative    XR CHEST PORT 1 VW 1/4/2019 12:30 AM    History: s/p LVAD and extubation    Comparison: 1/3/2019    Findings: Single AP view of the chest. Stable position of the right IJ  [place]-Calvin catheter, with the tip projecting over the right main  pulmonary artery. Stable left chest wall automatic implantable cardiac  defibrillator and leads. Mediastinal drains. Stable position of the  LVAD. Cardiomegaly, bilateral pleural effusions, and bibasilar  opacities are largely unchanged.      Impression    Impression:   1. Stable support devices.  2. Stable bibasilar atelectasis/consolidation and bilateral pleural  effusions.    I have personally reviewed the examination and initial interpretation  and I agree with the findings.    CORINNA PERAZA MD       =========================================  Patient seen and examined.Investigations reviewed. Wean ventilator. Wean inotropes as tolerated.. I agree with the findings outlined in the resident's note. I spent a total of 30 minutes examining the patient, reviewing investigations and therapeutic counseling.

## 2019-01-04 NOTE — PROGRESS NOTES
Began LVAD education with pt today. Pt was was not feeling well and only tolerated ~1hr of education. Will continue education next week, scheduled for [id number] Mon-Fri. Pt reports one of his sons will be coming next week Wed-Fri to also do education.

## 2019-01-04 NOTE — PLAN OF CARE
A+Ox4, Pt does have tingling and numbness in extremities baseline. Heartmate 3, 5200 RMP, PI ~3.5-6, Power ~3.6-3.8, Flow ~3.5-4. 10mg IV hydralazine given per MD for MAP's. CVP-14 PA ~38-40/16-24.  SVR ~1000. Heparin gtt increased to 1350 units/hr and will need a Heparin Xa level drawn at 1130.  No changes to chest tubes.  R internal jugular swan at 55.  L rad art line, PIV.  Will continue with plan of care.     Adjustment to Device (Ventricular Assist Device)  Optimal Adjustment to Device  1/4/2019 0603 - No Change by Kristine Cedeño RN     Ventricular Assist Device (Adult)  Signs and Symptoms of Listed Potential Problems Will be Absent, Minimized or Managed (Ventricular Assist Device)  1/4/2019 0603 - No Change by Kristine Cedeño, RN

## 2019-01-04 NOTE — PLAN OF CARE
Discharge Planner OT   Patient plan for discharge: Home with assist from brother/family  Current status: Pt progressing well in therapy. Completed bed mobility with mod A x 2 supine > sit EOB. Able to scoot EOB within precautions with VCs and SBA, sit <> stand with CGA/SBA and VCs. Completed marching exercises with BUE support on table, able to tolerate ~30 seconds each x 2 reps. Able to % of sternal precautions without VCs, educated pt on compensatory LB and UB dressing. Able to demonstrate AROM needed to use compensatory LB dressing technique, reporting increased difficulty with RLE. PIs fluctuating during session, with each initiated movement would decrease. At lowest 1.9, able to recover to >3.5 and ranging from 4-6. MAP >70 throughout, HR 80s, SpO2 95-96% on room air.  Barriers to return to prior living situation: decreased strength/activity tolerance, post-surgical precautions, pain, impaired ADLs, impaired mobility  Recommendations for discharge: Anticipate home with OP CR Phase II and assist from family  Rationale for recommendations: Pt progressing well with therapy, CGA/SBA for standing activity and basic ADLs. Continues to require increased assist for bed mobility. Pt with good motivation for therapy, anticipate pt will continue to progress and be able to discharge to Home with OP CR Phase II and assist from family. Pt would benefit from continued OP CR Phase II to address remaining deficits and maximize strength/independence to complete ADLs/IADLs at [facility].       Entered by: China Riojas 01/04/2019 2:56 PM

## 2019-01-04 NOTE — PLAN OF CARE
Shift durration: [id number]    LOC: alert, oriented x4.  No sedation.  Neuro: tingling and numbness in extremities baseline.  No acute changes.  Cards: Heart mate3, 5200 RMP, PI ~6's, Power ~3.6, Flow ~3.5.  One episode of MAPs ~100 with PI ~9.2 and Flow ~2.7 resolved with 10mg IV hydral. CVP ~10.PA ~30's/16's.  SVR ~1100's. Heparin gtt increased and bolused at 1800.    Pulm: LS clear, on RA.  No changes to chest tubes.  GI/: hyperglycemic today, writer notified CVTS and lantus is to start tonight also carb coverage with meals at pt's home dose of 1:12 considered per CVTS.  Pt had BM today, adequate UOP.  Skin: No changes to skin/incisions.  Mobility: A2 to manage lines.  Drains/Devices: R internal jugular swan at 55.  L rad art line, PIV.  Special/Event: No acute events overnight, continue to monitor and intervene as indicated as ICU level of care.

## 2019-01-05 ENCOUNTER — APPOINTMENT (OUTPATIENT)
Dept: OCCUPATIONAL THERAPY | Facility: CLINIC | Age: 59
End: 2019-01-05
Attending: INTERNAL MEDICINE
Payer: MEDICAID

## 2019-01-05 ENCOUNTER — APPOINTMENT (OUTPATIENT)
Dept: PHYSICAL THERAPY | Facility: CLINIC | Age: 59
End: 2019-01-05
Attending: INTERNAL MEDICINE
Payer: MEDICAID

## 2019-01-05 ENCOUNTER — APPOINTMENT (OUTPATIENT)
Dept: GENERAL RADIOLOGY | Facility: CLINIC | Age: 59
End: 2019-01-05
Attending: INTERNAL MEDICINE
Payer: MEDICAID

## 2019-01-05 LAB
ABO + RH BLD: NORMAL
ABO + RH BLD: NORMAL
ALBUMIN SERPL-MCNC: 2.5 G/DL (ref 3.4–5)
ALBUMIN SERPL-MCNC: 2.7 G/DL (ref 3.4–5)
ALBUMIN SERPL-MCNC: 2.8 G/DL (ref 3.4–5)
ALP SERPL-CCNC: 49 U/L (ref 40–150)
ALP SERPL-CCNC: 56 U/L (ref 40–150)
ALP SERPL-CCNC: 57 U/L (ref 40–150)
ALT SERPL W P-5'-P-CCNC: 169 U/L (ref 0–70)
ALT SERPL W P-5'-P-CCNC: 178 U/L (ref 0–70)
ALT SERPL W P-5'-P-CCNC: 210 U/L (ref 0–70)
ANION GAP SERPL CALCULATED.3IONS-SCNC: 10 MMOL/L (ref 3–14)
ANION GAP SERPL CALCULATED.3IONS-SCNC: 7 MMOL/L (ref 3–14)
ANION GAP SERPL CALCULATED.3IONS-SCNC: 9 MMOL/L (ref 3–14)
AST SERPL W P-5'-P-CCNC: 32 U/L (ref 0–45)
AST SERPL W P-5'-P-CCNC: 39 U/L (ref 0–45)
AST SERPL W P-5'-P-CCNC: 58 U/L (ref 0–45)
BASE EXCESS BLDV CALC-SCNC: 0.5 MMOL/L
BILIRUB SERPL-MCNC: 0.5 MG/DL (ref 0.2–1.3)
BILIRUB SERPL-MCNC: 0.7 MG/DL (ref 0.2–1.3)
BILIRUB SERPL-MCNC: 0.7 MG/DL (ref 0.2–1.3)
BLD GP AB SCN SERPL QL: NORMAL
BLOOD BANK CMNT PATIENT-IMP: NORMAL
BUN SERPL-MCNC: 22 MG/DL (ref 7–30)
BUN SERPL-MCNC: 22 MG/DL (ref 7–30)
BUN SERPL-MCNC: 23 MG/DL (ref 7–30)
CALCIUM SERPL-MCNC: 7.6 MG/DL (ref 8.5–10.1)
CALCIUM SERPL-MCNC: 7.9 MG/DL (ref 8.5–10.1)
CALCIUM SERPL-MCNC: 8.4 MG/DL (ref 8.5–10.1)
CHLORIDE SERPL-SCNC: 102 MMOL/L (ref 94–109)
CHLORIDE SERPL-SCNC: 105 MMOL/L (ref 94–109)
CHLORIDE SERPL-SCNC: 106 MMOL/L (ref 94–109)
CO2 SERPL-SCNC: 22 MMOL/L (ref 20–32)
CO2 SERPL-SCNC: 24 MMOL/L (ref 20–32)
CO2 SERPL-SCNC: 24 MMOL/L (ref 20–32)
CREAT SERPL-MCNC: 0.83 MG/DL (ref 0.66–1.25)
CREAT SERPL-MCNC: 1.29 MG/DL (ref 0.66–1.25)
CREAT SERPL-MCNC: 1.36 MG/DL (ref 0.66–1.25)
ERYTHROCYTE [DISTWIDTH] IN BLOOD BY AUTOMATED COUNT: 15.7 % (ref 10–15)
GFR SERPL CREATININE-BSD FRML MDRD: 58 ML/MIN/{1.73_M2}
GFR SERPL CREATININE-BSD FRML MDRD: 61 ML/MIN/{1.73_M2}
GFR SERPL CREATININE-BSD FRML MDRD: >90 ML/MIN/{1.73_M2}
GLUCOSE BLDC GLUCOMTR-MCNC: 177 MG/DL (ref 70–99)
GLUCOSE BLDC GLUCOMTR-MCNC: 229 MG/DL (ref 70–99)
GLUCOSE BLDC GLUCOMTR-MCNC: 233 MG/DL (ref 70–99)
GLUCOSE BLDC GLUCOMTR-MCNC: 238 MG/DL (ref 70–99)
GLUCOSE SERPL-MCNC: 162 MG/DL (ref 70–99)
GLUCOSE SERPL-MCNC: 237 MG/DL (ref 70–99)
GLUCOSE SERPL-MCNC: 282 MG/DL (ref 70–99)
HCO3 BLD-SCNC: NORMAL MMOL/L (ref 21–28)
HCO3 BLDV-SCNC: 25 MMOL/L (ref 21–28)
HCT VFR BLD AUTO: 26.8 % (ref 40–53)
HCT VFR BLD AUTO: 27.6 % (ref 40–53)
HCT VFR BLD AUTO: 28.3 % (ref 40–53)
HGB BLD-MCNC: 8.4 G/DL (ref 13.3–17.7)
HGB BLD-MCNC: 8.7 G/DL (ref 13.3–17.7)
HGB BLD-MCNC: 8.9 G/DL (ref 13.3–17.7)
INR PPP: 1.29 (ref 0.86–1.14)
LACTATE BLD-SCNC: 0.8 MMOL/L (ref 0.7–2)
LACTATE BLD-SCNC: 0.9 MMOL/L (ref 0.7–2)
LACTATE BLD-SCNC: 1.7 MMOL/L (ref 0.7–2)
LACTATE BLD-SCNC: NORMAL MMOL/L (ref 0.7–2)
LMWH PPP CHRO-ACNC: 0.11 IU/ML
LMWH PPP CHRO-ACNC: 0.22 IU/ML
MAGNESIUM SERPL-MCNC: 2 MG/DL (ref 1.6–2.3)
MCH RBC QN AUTO: 27.9 PG (ref 26.5–33)
MCH RBC QN AUTO: 27.9 PG (ref 26.5–33)
MCH RBC QN AUTO: 28.1 PG (ref 26.5–33)
MCHC RBC AUTO-ENTMCNC: 31.3 G/DL (ref 31.5–36.5)
MCHC RBC AUTO-ENTMCNC: 31.4 G/DL (ref 31.5–36.5)
MCHC RBC AUTO-ENTMCNC: 31.5 G/DL (ref 31.5–36.5)
MCV RBC AUTO: 89 FL (ref 78–100)
O2/TOTAL GAS SETTING VFR VENT: 21 %
O2/TOTAL GAS SETTING VFR VENT: NORMAL %
OXYHGB MFR BLDV: 58 %
PCO2 BLD: NORMAL MM HG (ref 35–45)
PCO2 BLDV: 37 MM HG (ref 40–50)
PH BLD: NORMAL PH (ref 7.35–7.45)
PH BLDV: 7.43 PH (ref 7.32–7.43)
PHOSPHATE SERPL-MCNC: 1.8 MG/DL (ref 2.5–4.5)
PLATELET # BLD AUTO: 135 10E9/L (ref 150–450)
PLATELET # BLD AUTO: 140 10E9/L (ref 150–450)
PLATELET # BLD AUTO: 166 10E9/L (ref 150–450)
PO2 BLD: NORMAL MM HG (ref 80–105)
PO2 BLDV: 31 MM HG (ref 25–47)
POTASSIUM SERPL-SCNC: 3.7 MMOL/L (ref 3.4–5.3)
POTASSIUM SERPL-SCNC: 3.7 MMOL/L (ref 3.4–5.3)
POTASSIUM SERPL-SCNC: 4 MMOL/L (ref 3.4–5.3)
PROT SERPL-MCNC: 5.8 G/DL (ref 6.8–8.8)
PROT SERPL-MCNC: 6 G/DL (ref 6.8–8.8)
PROT SERPL-MCNC: 6.2 G/DL (ref 6.8–8.8)
RBC # BLD AUTO: 3.01 10E12/L (ref 4.4–5.9)
RBC # BLD AUTO: 3.12 10E12/L (ref 4.4–5.9)
RBC # BLD AUTO: 3.17 10E12/L (ref 4.4–5.9)
SODIUM SERPL-SCNC: 134 MMOL/L (ref 133–144)
SODIUM SERPL-SCNC: 136 MMOL/L (ref 133–144)
SODIUM SERPL-SCNC: 138 MMOL/L (ref 133–144)
SPECIMEN EXP DATE BLD: NORMAL
WBC # BLD AUTO: 5.1 10E9/L (ref 4–11)
WBC # BLD AUTO: 5.4 10E9/L (ref 4–11)
WBC # BLD AUTO: 5.5 10E9/L (ref 4–11)

## 2019-01-05 PROCEDURE — 85520 HEPARIN ASSAY: CPT | Performed by: SURGERY

## 2019-01-05 PROCEDURE — 27210197 ZZH KIT POWER PICC TRIPLE LUMEN

## 2019-01-05 PROCEDURE — 97535 SELF CARE MNGMENT TRAINING: CPT | Mod: GO | Performed by: OCCUPATIONAL THERAPIST

## 2019-01-05 PROCEDURE — 25000128 H RX IP 250 OP 636: Performed by: STUDENT IN AN ORGANIZED HEALTH CARE EDUCATION/TRAINING PROGRAM

## 2019-01-05 PROCEDURE — 25000132 ZZH RX MED GY IP 250 OP 250 PS 637: Performed by: SURGERY

## 2019-01-05 PROCEDURE — 36569 INSJ PICC 5 YR+ W/O IMAGING: CPT

## 2019-01-05 PROCEDURE — 00000146 ZZHCL STATISTIC GLUCOSE BY METER IP

## 2019-01-05 PROCEDURE — 83605 ASSAY OF LACTIC ACID: CPT | Performed by: INTERNAL MEDICINE

## 2019-01-05 PROCEDURE — 83605 ASSAY OF LACTIC ACID: CPT | Performed by: SURGERY

## 2019-01-05 PROCEDURE — 84100 ASSAY OF PHOSPHORUS: CPT | Performed by: SURGERY

## 2019-01-05 PROCEDURE — 40000014 ZZH STATISTIC ARTERIAL MONITORING DAILY

## 2019-01-05 PROCEDURE — 25000128 H RX IP 250 OP 636: Performed by: THORACIC SURGERY (CARDIOTHORACIC VASCULAR SURGERY)

## 2019-01-05 PROCEDURE — 25000125 ZZHC RX 250: Performed by: THORACIC SURGERY (CARDIOTHORACIC VASCULAR SURGERY)

## 2019-01-05 PROCEDURE — 40000196 ZZH STATISTIC RAPCV CVP MONITORING

## 2019-01-05 PROCEDURE — 25000132 ZZH RX MED GY IP 250 OP 250 PS 637

## 2019-01-05 PROCEDURE — 85610 PROTHROMBIN TIME: CPT | Performed by: SURGERY

## 2019-01-05 PROCEDURE — 40000986 XR CHEST PORT 1 VW

## 2019-01-05 PROCEDURE — 82805 BLOOD GASES W/O2 SATURATION: CPT | Performed by: INTERNAL MEDICINE

## 2019-01-05 PROCEDURE — 85027 COMPLETE CBC AUTOMATED: CPT | Performed by: SURGERY

## 2019-01-05 PROCEDURE — 99232 SBSQ HOSP IP/OBS MODERATE 35: CPT | Mod: GC | Performed by: INTERNAL MEDICINE

## 2019-01-05 PROCEDURE — 97530 THERAPEUTIC ACTIVITIES: CPT | Mod: GP

## 2019-01-05 PROCEDURE — 83735 ASSAY OF MAGNESIUM: CPT | Performed by: SURGERY

## 2019-01-05 PROCEDURE — 86900 BLOOD TYPING SEROLOGIC ABO: CPT | Performed by: SURGERY

## 2019-01-05 PROCEDURE — 40000193 ZZH STATISTIC PT WARD VISIT

## 2019-01-05 PROCEDURE — 86850 RBC ANTIBODY SCREEN: CPT | Performed by: SURGERY

## 2019-01-05 PROCEDURE — C1769 GUIDE WIRE: HCPCS

## 2019-01-05 PROCEDURE — 25000125 ZZHC RX 250: Performed by: STUDENT IN AN ORGANIZED HEALTH CARE EDUCATION/TRAINING PROGRAM

## 2019-01-05 PROCEDURE — 40000048 ZZH STATISTIC DAILY SWAN MONITORING

## 2019-01-05 PROCEDURE — 12000012 ZZH R&B MS OVERFLOW UMMC

## 2019-01-05 PROCEDURE — 25000132 ZZH RX MED GY IP 250 OP 250 PS 637: Performed by: THORACIC SURGERY (CARDIOTHORACIC VASCULAR SURGERY)

## 2019-01-05 PROCEDURE — 40000133 ZZH STATISTIC OT WARD VISIT: Performed by: OCCUPATIONAL THERAPIST

## 2019-01-05 PROCEDURE — 40000275 ZZH STATISTIC RCP TIME EA 10 MIN

## 2019-01-05 PROCEDURE — 86901 BLOOD TYPING SEROLOGIC RH(D): CPT | Performed by: SURGERY

## 2019-01-05 PROCEDURE — 97116 GAIT TRAINING THERAPY: CPT | Mod: GP

## 2019-01-05 PROCEDURE — 80053 COMPREHEN METABOLIC PANEL: CPT | Performed by: SURGERY

## 2019-01-05 RX ORDER — ASPIRIN 81 MG/1
81 TABLET, CHEWABLE ORAL DAILY
Status: DISCONTINUED | OUTPATIENT
Start: 2019-01-06 | End: 2019-01-11 | Stop reason: HOSPADM

## 2019-01-05 RX ADMIN — POTASSIUM CHLORIDE 20 MEQ: 750 TABLET, EXTENDED RELEASE ORAL at 07:04

## 2019-01-05 RX ADMIN — AMIODARONE HYDROCHLORIDE 200 MG: 200 TABLET ORAL at 07:46

## 2019-01-05 RX ADMIN — ACETAMINOPHEN 650 MG: 325 TABLET, FILM COATED ORAL at 21:26

## 2019-01-05 RX ADMIN — HEPARIN SODIUM 1350 UNITS/HR: 10000 INJECTION, SOLUTION INTRAVENOUS at 05:45

## 2019-01-05 RX ADMIN — LIDOCAINE HYDROCHLORIDE 1 ML: 10 INJECTION, SOLUTION EPIDURAL; INFILTRATION; INTRACAUDAL; PERINEURAL at 14:56

## 2019-01-05 RX ADMIN — OXYCODONE HYDROCHLORIDE 5 MG: 5 TABLET ORAL at 07:47

## 2019-01-05 RX ADMIN — Medication 3.5 MG: at 18:21

## 2019-01-05 RX ADMIN — INSULIN ASPART 4 UNITS: 100 INJECTION, SOLUTION INTRAVENOUS; SUBCUTANEOUS at 18:20

## 2019-01-05 RX ADMIN — DIGOXIN 125 MCG: 125 TABLET ORAL at 07:47

## 2019-01-05 RX ADMIN — POTASSIUM PHOSPHATE, MONOBASIC AND POTASSIUM PHOSPHATE, DIBASIC 20 MMOL: 224; 236 INJECTION, SOLUTION INTRAVENOUS at 13:34

## 2019-01-05 RX ADMIN — PANTOPRAZOLE SODIUM 40 MG: 40 TABLET, DELAYED RELEASE ORAL at 07:47

## 2019-01-05 RX ADMIN — HYDRALAZINE HYDROCHLORIDE 10 MG: 20 INJECTION INTRAMUSCULAR; INTRAVENOUS at 05:19

## 2019-01-05 RX ADMIN — POTASSIUM CHLORIDE 20 MEQ: 750 TABLET, EXTENDED RELEASE ORAL at 21:38

## 2019-01-05 RX ADMIN — HEPARIN SODIUM 1500 UNITS/HR: 10000 INJECTION, SOLUTION INTRAVENOUS at 07:36

## 2019-01-05 RX ADMIN — LIDOCAINE 2 PATCH: 560 PATCH PERCUTANEOUS; TOPICAL; TRANSDERMAL at 07:46

## 2019-01-05 RX ADMIN — INSULIN ASPART 2 UNITS: 100 INJECTION, SOLUTION INTRAVENOUS; SUBCUTANEOUS at 07:52

## 2019-01-05 RX ADMIN — ASPIRIN 81 MG 81 MG: 81 TABLET ORAL at 07:47

## 2019-01-05 RX ADMIN — INSULIN ASPART 6 UNITS: 100 INJECTION, SOLUTION INTRAVENOUS; SUBCUTANEOUS at 11:40

## 2019-01-05 RX ADMIN — POTASSIUM CHLORIDE 20 MEQ: 750 TABLET, EXTENDED RELEASE ORAL at 18:21

## 2019-01-05 ASSESSMENT — ACTIVITIES OF DAILY LIVING (ADL)
ADLS_ACUITY_SCORE: 16
ADLS_ACUITY_SCORE: 17
ADLS_ACUITY_SCORE: 16

## 2019-01-05 ASSESSMENT — MIFFLIN-ST. JEOR: SCORE: 1562

## 2019-01-05 NOTE — PROGRESS NOTES
CV ICU PROGRESS NOTE  January 5, 2019    CO-MORBIDITIES:   Chronic systolic heart failure (H)  (primary encounter diagnosis)  STEMI  ICD  DM     ASSESSMENT: Mariusz Sharp is a 58 year old male now s/p LVAD (HM 3) placement with Dr. Baker on 12/31/2018 for a history of ischemic cardiomyopathy with reduced ejection fraction. There were no major intraoperative events to report and the patient was able to be liberated from cardiopulmonary bypass without significant difficulty. Concern regarding low flow states prompted keeping the patient intubated on 1/1 and on 1/2 following a SELVIN, the patient was able to be extubated. Elevated MAPs required initiating afterload reduction on 1/3. Doing well postoperatively now      TODAY'S PROGRESS:   - low intensity heparin, warfarin till bridging complete   - lantus 20 at bedtime, will increase if carb consitent diet not helping   - hydralazine PRN for MAP > 90  - out of bed, ambulate, PT/OT  - swan ana removal as  per cardiology  - keep dobutamine  - discontinue dilaudid   - discontinue BIPAP, will recheck ABG and CXR if clinical reason to     PLAN:  Neuro/pain/sedation:  - Monitor neurological status. Notify the MD for any acute changes in exam.  - TylenolPRN. Oxy PRN. Robaxin PRN. Will discontinue Dilaudid today   - Lidoderm patches for pain      Pulmonary:   - Supplemental oxygen to keep saturation above 92 %.  - Incentive spirometer every 15- 30 minutes when awake.  - Will obtain ABG and CXR if clinically worsens      Cardiovascular:  #LVAD  - 2x low flow, high PI events, SELVIN 1/2/19 no issues since extubation  - Monitor hemodynamic status.   - Dobutamine 2.5  - MAP > 65.  - PTA amiodarone and digoxin  - Holding metoplolol  - PRN hydralazine for MAP > 90     GI care:   - Carb consistent diet   - Miralax/senna-colace scheulded, suppository PRN  - LFTs downtrending overall     Fluids/ Electrolytes/ Nutrition:   - TKO  - ADAT    Renal:    - Urine output is adequate so far.  -  "Replete lytes   - Will continue to monitor intake and output.  - Goal slight negative     Endocrine:    - CHO consistent diet   - Insulin sliding scale  - lantus 20u at bedtime today,will reassess this PM if we need to increase lantus dosing      ID/ Antibiotics:  - Periop antibiotics completed     Heme:     - Anticoagulation: heparin low intensity  - warfarin started , INR 1.29 today     Prophylaxis:    - Mechanical prophylaxis for DVT.   - heparin gtt, warfarin   - PPI     Lines/tubes/drains:  - right IV CVC with [place], arterial line, PIV   - Chest tubes: med x2, left pleural, pericardial tee     Disposition:  -  CV ICU, TTF once lines out     Benny Pelletier MD   [phone]      ====================================    TODAY'S PROGRESS:   SUBJECTIVE:   - no acute issues overnight, reports improvement in malaise and abdominal pain after 20 mg IV lasix yesterday. No isues with LVAD overnight. Adequate UOP. ABG post lasix yesterday PM   Was WNL with improvement in PO2 ( 7.42/21/86/14)      OBJECTIVE:   1. VITAL SIGNS:   BP 99/69   Pulse 80   Temp 97.7  F (36.5  C) (Oral)   Resp 18   Ht 1.626 m (5' 4\")   Wt 82.1 kg (180 lb 16 oz)   SpO2 95%   BMI 31.07 kg/m      2. INTAKE/ OUTPUT:   I/O last 3 completed shifts:  Net -1.19 L yesterday  UOP: 400,1350, 375 ml last 3 shift     3. PHYSICAL EXAMINATION:   General:   Neuro: A&Ox3, NAD  Resp: Breathing non-labored  CV: regular rate   Abdomen: Soft, Non-distended, Non-tender  Incisions: c/d/i  Extremities: warm and well perfused, trace edema     4. INVESTIGATIONS:   Arterial Blood Gases   Recent Labs   Lab 01/02/19  1416 01/02/19  1258 01/02/19  0812 01/02/19  0339   PH 7.43 7.44 7.38 7.38   PCO2 36 36 41 45   PO2 115* 107* 283* 127*   HCO3 24 24 24 26     Complete Blood Count   Recent Labs   Lab 01/04/19  0356 01/03/19  2040 01/03/19  1227 01/03/19  0959   WBC 5.8 6.4 7.1 6.7   HGB 8.4* 8.7* 8.4* 8.1*   * 101* 107* 98*     Basic Metabolic Panel  Recent Labs "   Lab 01/04/19  0356 01/03/19 2040 01/03/19  1227 01/03/19  0959    137 139 137   POTASSIUM 4.2 4.2 4.0 4.6   CHLORIDE 104 105 108 105   CO2 22 20 19* 22   BUN 26 26 23 23   CR 1.36* 1.32* 1.40* 1.61*   * 183* 145* 143*     Liver Function Tests  Recent Labs   Lab 01/04/19  0356 01/03/19  2040 01/03/19  1227 01/03/19  0959  01/02/19  0812  01/01/19  0941   * 163* 183* 195*   < > 385*   < > 35   * 266* 252* 266*   < > 250*   < > 19   ALKPHOS 51 53 49 50   < > 47   < > 31*   BILITOTAL 1.2 1.6* 2.3* 2.6*   < > 2.2*   < > 2.2*   ALBUMIN 2.9* 3.0* 2.7* 2.9*   < > 3.0*   < > 3.1*   INR 1.27*  --   --  1.38*  --  1.50*  --  1.39*    < > = values in this interval not displayed.     Pancreatic Enzymes  No lab results found in last 7 days.  Coagulation Profile  Recent Labs   Lab 01/04/19 0356 01/03/19  0959 01/03/19  0336 01/02/19  0812 01/02/19  0339 01/01/19  0941  12/31/18  1302   INR 1.27* 1.38*  --  1.50*  --  1.39*   < > 1.42*   PTT  --   --  50* 48* 55*  --   --  40*    < > = values in this interval not displayed.     Lactate  Invalid input(s): LACTATE    5. RADIOLOGY:   Recent Results (from the past 24 hour(s))   XR Chest Port 1 View    Narrative    XR CHEST PORT 1 VW 1/4/2019 12:30 AM    History: s/p LVAD and extubation    Comparison: 1/3/2019    Findings: Single AP view of the chest. Stable position of the right IJ  Chappell-Calvin catheter, with the tip projecting over the right main  pulmonary artery. Stable left chest wall automatic implantable cardiac  defibrillator and leads. Mediastinal drains. Stable position of the  LVAD. Cardiomegaly, bilateral pleural effusions, and bibasilar  opacities are largely unchanged.      Impression    Impression:   1. Stable support devices.  2. Stable bibasilar atelectasis/consolidation and bilateral pleural  effusions.    I have personally reviewed the examination and initial interpretation  and I agree with the findings.    CORINNA PERAZA MD        =========================================

## 2019-01-05 NOTE — PLAN OF CARE
Discharge Planner PT   Patient plan for discharge: not discussed  Current status: patient progressing well with therapies, not as limited by dizziness this session as previous sessions. Patient demos bed mobility with Bebe, sit <> stands SBA - CGA & assist for lines. Ambulates with CGA x1 and  feet; PIs down to 3.1 with ambulation, pt reports 1 episode of dizziness during gait which resolves within ~15 seconds. RN present during ambulation.  Barriers to return to prior living situation: level of assist, medical stability  Recommendations for discharge: home with OP CR, assist from family PRN  Rationale for recommendations: pt will likely be safe to discharge home, recommend OP CR to progress tolerance to activity       Entered by: Caitlyn Lauren 01/05/2019 8:32 AM

## 2019-01-05 NOTE — PLAN OF CARE
Cares assumed [id number].    Neuro: A&O x 4, makes needs known. PERRLA. Afebrile. Moves all extremities.    Cardiac: SR, HR 80-90s. BPs stable, MAP 70-90s, hydralazine given x 1 for MAP sustaining > 90. CVP 7-16, PA 30s/16s, SVR 9682-0559. Heparin infusing at 1350, Xa therapeutic, recheck in AM. Dobutamine straight rate at 2.5. K 3.8, replaced w/ 20 mEq.     Pulm: LS clear/dim. Mediastinal chest tubes removed, pleural chest tube x 2 w/ serosanguinous output.    GI/: Hyperglycemic, covered w/ sliding scale insulin. Fair appetite, consumed 25-75% of meals. Had 2 loose BMs today. Adequate UOP, IV lasix 20 mg given w/ good results. C/o nausea, IV Zofran given w/ relief.     Skin: No changes, incisions remain CDI.     Pain: C/o mild pain. Pain managed w/ rest/repo and PRN Tylenol.     Activity: Up to chair w/ assist of 1-2 to assist with lines.     POC reviewed with patient. Plan for PICC line placement tomorrow and BIPAP use overnight.

## 2019-01-05 NOTE — PROGRESS NOTES
CVTS PROGRESS NOTE  January 5, 2019    CO-MORBIDITIES:   Chronic systolic heart failure (H)  (primary encounter diagnosis)  STEMI  ICD  DM     ASSESSMENT: Mariusz Sharp is a 58 year old male now s/p LVAD (HM 3) placement with Dr. Baker on 12/31/2018 for a history of ischemic cardiomyopathy with reduced ejection fraction. There were no major intraoperative events to report and the patient was able to be liberated from cardiopulmonary bypass without significant difficulty. Concern regarding low flow states prompted keeping the patient intubated on 1/1 and on 1/2 following a SELVIN, the patient was able to be extubated. Elevated MAPs required initiating afterload reduction on 1/3. Doing well postoperatively now      TODAY'S PROGRESS:   - low intensity heparin, warfarin till bridging complete   - lantus 20 at bedtime, will increase if carb consitent diet not helping   - hydralazine PRN for MAP > 90  - out of bed, ambulate, PT/OT  - swan ana removal as  per cardiology  - keep dobutamine  - discontinue dilaudid   - discontinue BIPAP, will recheck ABG and CXR if clinical reason to     PLAN:  Neuro/pain/sedation:  - Monitor neurological status. Notify the MD for any acute changes in exam.  - TylenolPRN. Oxy PRN. Robaxin PRN. Will discontinue Dilaudid today   - Lidoderm patches for pain      Pulmonary:   - Supplemental oxygen to keep saturation above 92 %.  - Incentive spirometer every 15- 30 minutes when awake.  - Will obtain ABG and CXR if clinically worsens      Cardiovascular:  #LVAD  - 2x low flow, high PI events, SELVIN 1/2/19 no issues since extubation  - Monitor hemodynamic status.   - Dobutamine 2.5  - MAP > 65.  - PTA amiodarone and digoxin  - Holding metoplolol  - PRN hydralazine for MAP > 90     GI care:   - Carb consistent diet   - Miralax/senna-colace scheulded, suppository PRN  - LFTs downtrending overall     Fluids/ Electrolytes/ Nutrition:   - TKO  - ADAT    Renal:    - Urine output is adequate so far.  -  "Replete lytes   - Will continue to monitor intake and output.  - Goal slight negative     Endocrine:    - CHO consistent diet   - Insulin sliding scale  - lantus 20u at bedtime today,will reassess this PM if we need to increase lantus dosing      ID/ Antibiotics:  - Periop antibiotics completed     Heme:     - Anticoagulation: heparin low intensity  - warfarin started , INR 1.29 today     Prophylaxis:    - Mechanical prophylaxis for DVT.   - heparin gtt, warfarin   - PPI     Lines/tubes/drains:  - right IV CVC with [place], arterial line, PIV   - Chest tubes: med x2, left pleural, pericardial tee     Disposition:  -  CV ICU, TTF once lines out     Benny Pelletier MD   [phone]      ====================================    TODAY'S PROGRESS:   SUBJECTIVE:   - no acute issues overnight, reports improvement in malaise and abdominal pain after 20 mg IV lasix yesterday. No isues with LVAD overnight. Adequate UOP. ABG post lasix yesterday PM   Was WNL with improvement in PO2 ( 7.42/21/86/14)      OBJECTIVE:   1. VITAL SIGNS:   BP 99/69   Pulse 80   Temp 97.7  F (36.5  C) (Oral)   Resp 18   Ht 1.626 m (5' 4\")   Wt 82.1 kg (180 lb 16 oz)   SpO2 95%   BMI 31.07 kg/m      2. INTAKE/ OUTPUT:   I/O last 3 completed shifts:  Net -1.19 L yesterday  UOP: 400,1350, 375 ml last 3 shift     3. PHYSICAL EXAMINATION:   General:   Neuro: A&Ox3, NAD  Resp: Breathing non-labored  CV: regular rate   Abdomen: Soft, Non-distended, Non-tender  Incisions: c/d/i  Extremities: warm and well perfused, trace edema     4. INVESTIGATIONS:   Arterial Blood Gases   Recent Labs   Lab 01/02/19  1416 01/02/19  1258 01/02/19  0812 01/02/19  0339   PH 7.43 7.44 7.38 7.38   PCO2 36 36 41 45   PO2 115* 107* 283* 127*   HCO3 24 24 24 26     Complete Blood Count   Recent Labs   Lab 01/04/19  0356 01/03/19  2040 01/03/19  1227 01/03/19  0959   WBC 5.8 6.4 7.1 6.7   HGB 8.4* 8.7* 8.4* 8.1*   * 101* 107* 98*     Basic Metabolic Panel  Recent Labs "   Lab 01/04/19  0356 01/03/19 2040 01/03/19  1227 01/03/19  0959    137 139 137   POTASSIUM 4.2 4.2 4.0 4.6   CHLORIDE 104 105 108 105   CO2 22 20 19* 22   BUN 26 26 23 23   CR 1.36* 1.32* 1.40* 1.61*   * 183* 145* 143*     Liver Function Tests  Recent Labs   Lab 01/04/19  0356 01/03/19  2040 01/03/19  1227 01/03/19  0959  01/02/19  0812  01/01/19  0941   * 163* 183* 195*   < > 385*   < > 35   * 266* 252* 266*   < > 250*   < > 19   ALKPHOS 51 53 49 50   < > 47   < > 31*   BILITOTAL 1.2 1.6* 2.3* 2.6*   < > 2.2*   < > 2.2*   ALBUMIN 2.9* 3.0* 2.7* 2.9*   < > 3.0*   < > 3.1*   INR 1.27*  --   --  1.38*  --  1.50*  --  1.39*    < > = values in this interval not displayed.     Pancreatic Enzymes  No lab results found in last 7 days.  Coagulation Profile  Recent Labs   Lab 01/04/19 0356 01/03/19  0959 01/03/19  0336 01/02/19  0812 01/02/19  0339 01/01/19  0941  12/31/18  1302   INR 1.27* 1.38*  --  1.50*  --  1.39*   < > 1.42*   PTT  --   --  50* 48* 55*  --   --  40*    < > = values in this interval not displayed.     Lactate  Invalid input(s): LACTATE    5. RADIOLOGY:   Recent Results (from the past 24 hour(s))   XR Chest Port 1 View    Narrative    XR CHEST PORT 1 VW 1/4/2019 12:30 AM    History: s/p LVAD and extubation    Comparison: 1/3/2019    Findings: Single AP view of the chest. Stable position of the right IJ  [place]-Calvin catheter, with the tip projecting over the right main  pulmonary artery. Stable left chest wall automatic implantable cardiac  defibrillator and leads. Mediastinal drains. Stable position of the  LVAD. Cardiomegaly, bilateral pleural effusions, and bibasilar  opacities are largely unchanged.      Impression    Impression:   1. Stable support devices.  2. Stable bibasilar atelectasis/consolidation and bilateral pleural  effusions.    I have personally reviewed the examination and initial interpretation  and I agree with the findings.    CORINNA PERZAA MD        =========================================

## 2019-01-05 NOTE — PLAN OF CARE
Discharge Planner OT   Patient plan for discharge: TCU  Current status: pt with understanding of precautions, no OOB witnessed today 2/2 pt with multiple caregivers on multiple attempts.   Barriers to return to prior living situation: post surgical precautions.   Recommendations for discharge: TCU  Rationale for recommendations: pt will require skilled OT/PT to regain functional strength/compensations for increased ADL I.        Entered by: Michael Linn 01/05/2019 3:29 PM

## 2019-01-05 NOTE — PLAN OF CARE
Adult Inpatient Plan of Care  Plan of Care Review  No significant events overnight.  Hydralazine given twice for map greater than 90.  Noted that when map this high, PI numbers on the LVAD are elevated in the 5-6 range but quickly return to the 4 range after map lowered.  Good appetite this shift.  Pt. Had applesauce and pudding.   Minimal pain.  Taking tylenol and oxycodone.

## 2019-01-05 NOTE — PROGRESS NOTES
ADVANCED HEART FAILURE PROGRESS NOTE    SUBJECTIVE:  No acute events.     ROS otherwise negative.    OBJECTIVE:  Vital signs:  Temp: 98.4  F (36.9  C) Temp  Min: 98.1  F (36.7  C)  Max: 98.8  F (37.1  C)  Heart Rate: 84 Heart Rate  Min: 76  Max: 113    No data recorded.  No data recorded.    Resp: 20 Resp  Min: 16  Max: 20  SpO2: 94 % SpO2  Min: 90 %  Max: 99 %    HM3:  5300 RPM, 3.5-4.2 LPM, PI 3.5-4.5      Intake/Output Summary (Last 24 hours) at 1/5/2019 0647  Last data filed at 1/5/2019 0600  Gross per 24 hour   Intake 1382.8 ml   Output 2415 ml   Net -1032.2 ml       Vitals:    01/03/19 0400 01/04/19 0500 01/05/19 0100   Weight: 77 kg (169 lb 11.2 oz) 76.5 kg (168 lb 11.2 oz) 82.1 kg (180 lb 16 oz)       Physical Exam:  Gen: NAD  HEENT: minimally elevated JVP  Resp: coarse bilaterally, no clear wheezes  CVS: normal rate, VAD hum, no JVD  Abdo: soft, nontender  Extremities: warm, no edema   Neuro: non focal       Medications:    IV fluid REPLACEMENT ONLY       DOBUTamine 2.5 mcg/kg/min (01/05/19 0600)     HEParin 1,350 Units/hr (01/05/19 0600)     - MEDICATION INSTRUCTIONS -       BETA BLOCKER NOT PRESCRIBED       Warfarin Therapy Reminder         Current Facility-Administered Medications   Medication Dose Route Frequency     amiodarone  200 mg Oral Daily     aspirin  81 mg Oral Daily     digoxin  125 mcg Oral Daily     influenza vaccine adult (product based on age)  0.5 mL Intramuscular Prior to discharge     insulin aspart  1-10 Units Subcutaneous TID AC     insulin aspart  1-7 Units Subcutaneous At Bedtime     insulin glargine  20 Units Subcutaneous At Bedtime     lidocaine  2 patch Transdermal Q24H     lidocaine   Transdermal Q8H     lidocaine   Transdermal Q24h     pantoprazole  40 mg Oral QAM AC     polyethylene glycol  17 g Oral BID     senna-docusate  1 tablet Oral BID    Or     senna-docusate  2 tablet Oral BID     sodium chloride (PF)  10 mL Intravenous Once     sodium chloride (PF)  3 mL Intracatheter  Q8H     sodium chloride (PF)  3 mL Intracatheter Q8H         Labs:  CMP  Recent Labs   Lab 01/05/19 0525 01/04/19 2128 01/04/19 0356 01/03/19 2040 01/02/19 0812 12/30/18 2007    136   < > 136 137   < > 142   < > 137   POTASSIUM 3.7 3.6   < > 4.2 4.2   < > 5.2   < > 3.6   CHLORIDE 106 104   < > 104 105   < > 111*   < > 102   CO2 24 23   < > 22 20   < > 24   < > 28   BUN 23 24   < > 26 26   < > 19   < > 40*   CR 1.36* 1.39*   < > 1.36* 1.32*   < > 1.79*   < > 1.67*   ARLENE 8.4* 8.0*   < > 8.3* 8.7   < > 8.2*   < > 8.5   MAG  --   --   --  2.1 2.1   < >  --    < >  --    PHOS  --   --   --  2.6 2.9   < >  --    < >  --    * 184*   < > 165* 183*   < > 152*   < > 280*   LDH  --   --   --   --   --   --  443*  --  145   ALKPHOS 56 50   < > 51 53   < > 47   < > 43   BILITOTAL 0.7 0.7   < > 1.2 1.6*   < > 2.2*   < > 0.7   AST 58* 82*   < > 142* 163*   < > 385*   < > 20   * 243*   < > 269* 266*   < > 250*   < > 31    < > = values in this interval not displayed.     BLOOD GAS  Recent Labs   Lab 01/05/19 0525 01/04/19  1746   PH Incorrect specimen type 7.42   PCO2 Incorrect specimen type 21*   PO2 Incorrect specimen type 86     CBC  Recent Labs   Lab 01/05/19 0525 01/04/19 2128   WBC 5.1 5.1   HGB 8.7* 8.6*   HCT 27.6* 26.8*   * 127*     COAG  Recent Labs   Lab 01/05/19 0525 01/04/19 0356 01/03/19 0336 01/02/19 0812   INR 1.29* 1.27*   < >  --  1.50*   PTT  --   --   --  50* 48*    < > = values in this interval not displayed.         ASSESSMENT/PLAN:  58 year old male with a history of ICM, CAD (s/p STEMI with ALYSSA to LAD 6/27/18), monomorphic VT, LV thrombus, CKD Stage III, paroxysmal AF, DM Type II who was admitted for placement of LVAD. Now s/p HM3 LVAD 12/31.     HeartMate III LVAD placed 12/31:  - speed 5300 RPM, will increase as tolerated  - on ASA 81  - on heparin drip, starting warfarin  - on dobutamine 2.5 for RV support  - SELVIN showed moderate-severe RV dysfunction, LV unloaded  with inflow cannula in good position     Ischemic cardiomyopathy, CAD:  - ALYSSA to LAD 6/27/2018  - was not on ACE-I due to CKD  - holding BB after LVAD  - continue rosuvastatin  - was on plavix, holding now after surgery     Paroxysmal afib:  - currently in sinus rhythm  - holding BB after LVAD  - starting heparin drip     CKD  - stable with good urine output        Seen and staffed with Dr. Naga Romo MD  Advanced Heart Failure Fellow  [phone]

## 2019-01-06 ENCOUNTER — APPOINTMENT (OUTPATIENT)
Dept: OCCUPATIONAL THERAPY | Facility: CLINIC | Age: 59
End: 2019-01-06
Attending: INTERNAL MEDICINE
Payer: MEDICAID

## 2019-01-06 LAB
BLD PROD TYP BPU: NORMAL
BLD UNIT ID BPU: 0
BLOOD PRODUCT CODE: NORMAL
BPU ID: NORMAL
ERYTHROCYTE [DISTWIDTH] IN BLOOD BY AUTOMATED COUNT: 15.9 % (ref 10–15)
GLUCOSE BLDC GLUCOMTR-MCNC: 172 MG/DL (ref 70–99)
GLUCOSE BLDC GLUCOMTR-MCNC: 200 MG/DL (ref 70–99)
GLUCOSE BLDC GLUCOMTR-MCNC: 217 MG/DL (ref 70–99)
GLUCOSE BLDC GLUCOMTR-MCNC: 231 MG/DL (ref 70–99)
GLUCOSE BLDC GLUCOMTR-MCNC: 265 MG/DL (ref 70–99)
HCT VFR BLD AUTO: 26.8 % (ref 40–53)
HGB BLD-MCNC: 8.4 G/DL (ref 13.3–17.7)
INR PPP: 1.25 (ref 0.86–1.14)
LMWH PPP CHRO-ACNC: 0.21 IU/ML
MAGNESIUM SERPL-MCNC: 2 MG/DL (ref 1.6–2.3)
MCH RBC QN AUTO: 27.9 PG (ref 26.5–33)
MCHC RBC AUTO-ENTMCNC: 31.3 G/DL (ref 31.5–36.5)
MCV RBC AUTO: 89 FL (ref 78–100)
PHOSPHATE SERPL-MCNC: 2.5 MG/DL (ref 2.5–4.5)
PLATELET # BLD AUTO: 168 10E9/L (ref 150–450)
POTASSIUM SERPL-SCNC: 3.9 MMOL/L (ref 3.4–5.3)
RBC # BLD AUTO: 3.01 10E12/L (ref 4.4–5.9)
TRANSFUSION STATUS PATIENT QL: NORMAL
TRANSFUSION STATUS PATIENT QL: NORMAL
WBC # BLD AUTO: 5.4 10E9/L (ref 4–11)

## 2019-01-06 PROCEDURE — 84132 ASSAY OF SERUM POTASSIUM: CPT | Performed by: PHYSICIAN ASSISTANT

## 2019-01-06 PROCEDURE — 97110 THERAPEUTIC EXERCISES: CPT | Mod: GO | Performed by: OCCUPATIONAL THERAPIST

## 2019-01-06 PROCEDURE — 84100 ASSAY OF PHOSPHORUS: CPT | Performed by: PHYSICIAN ASSISTANT

## 2019-01-06 PROCEDURE — 25000132 ZZH RX MED GY IP 250 OP 250 PS 637

## 2019-01-06 PROCEDURE — 25000128 H RX IP 250 OP 636: Performed by: STUDENT IN AN ORGANIZED HEALTH CARE EDUCATION/TRAINING PROGRAM

## 2019-01-06 PROCEDURE — 25000132 ZZH RX MED GY IP 250 OP 250 PS 637: Performed by: INTERNAL MEDICINE

## 2019-01-06 PROCEDURE — 25000132 ZZH RX MED GY IP 250 OP 250 PS 637: Performed by: PHYSICIAN ASSISTANT

## 2019-01-06 PROCEDURE — 12000012 ZZH R&B MS OVERFLOW UMMC

## 2019-01-06 PROCEDURE — 40000133 ZZH STATISTIC OT WARD VISIT: Performed by: OCCUPATIONAL THERAPIST

## 2019-01-06 PROCEDURE — 25000132 ZZH RX MED GY IP 250 OP 250 PS 637: Performed by: THORACIC SURGERY (CARDIOTHORACIC VASCULAR SURGERY)

## 2019-01-06 PROCEDURE — 85027 COMPLETE CBC AUTOMATED: CPT | Performed by: PHYSICIAN ASSISTANT

## 2019-01-06 PROCEDURE — 99232 SBSQ HOSP IP/OBS MODERATE 35: CPT | Mod: GC | Performed by: INTERNAL MEDICINE

## 2019-01-06 PROCEDURE — 25000132 ZZH RX MED GY IP 250 OP 250 PS 637: Performed by: SURGERY

## 2019-01-06 PROCEDURE — 85610 PROTHROMBIN TIME: CPT | Performed by: PHYSICIAN ASSISTANT

## 2019-01-06 PROCEDURE — 83735 ASSAY OF MAGNESIUM: CPT | Performed by: PHYSICIAN ASSISTANT

## 2019-01-06 PROCEDURE — 85520 HEPARIN ASSAY: CPT | Performed by: PHYSICIAN ASSISTANT

## 2019-01-06 PROCEDURE — 00000146 ZZHCL STATISTIC GLUCOSE BY METER IP

## 2019-01-06 PROCEDURE — 97535 SELF CARE MNGMENT TRAINING: CPT | Mod: GO | Performed by: OCCUPATIONAL THERAPIST

## 2019-01-06 RX ORDER — WARFARIN SODIUM 4 MG/1
4 TABLET ORAL
Status: COMPLETED | OUTPATIENT
Start: 2019-01-06 | End: 2019-01-06

## 2019-01-06 RX ORDER — DEXTROSE MONOHYDRATE 25 G/50ML
25-50 INJECTION, SOLUTION INTRAVENOUS
Status: DISCONTINUED | OUTPATIENT
Start: 2019-01-06 | End: 2019-01-06

## 2019-01-06 RX ORDER — HYDRALAZINE HYDROCHLORIDE 10 MG/1
10 TABLET, FILM COATED ORAL EVERY 8 HOURS SCHEDULED
Status: DISCONTINUED | OUTPATIENT
Start: 2019-01-06 | End: 2019-01-08

## 2019-01-06 RX ORDER — NICOTINE POLACRILEX 4 MG
15-30 LOZENGE BUCCAL
Status: DISCONTINUED | OUTPATIENT
Start: 2019-01-06 | End: 2019-01-06

## 2019-01-06 RX ADMIN — HYDRALAZINE HYDROCHLORIDE 10 MG: 20 INJECTION INTRAMUSCULAR; INTRAVENOUS at 12:17

## 2019-01-06 RX ADMIN — AMIODARONE HYDROCHLORIDE 200 MG: 200 TABLET ORAL at 07:35

## 2019-01-06 RX ADMIN — HEPARIN SODIUM 1500 UNITS/HR: 10000 INJECTION, SOLUTION INTRAVENOUS at 22:46

## 2019-01-06 RX ADMIN — POLYETHYLENE GLYCOL 3350 17 G: 17 POWDER, FOR SOLUTION ORAL at 07:35

## 2019-01-06 RX ADMIN — HYDRALAZINE HYDROCHLORIDE 10 MG: 20 INJECTION INTRAMUSCULAR; INTRAVENOUS at 03:03

## 2019-01-06 RX ADMIN — HYDRALAZINE HYDROCHLORIDE 10 MG: 10 TABLET, FILM COATED ORAL at 22:44

## 2019-01-06 RX ADMIN — INSULIN ASPART 2 UNITS: 100 INJECTION, SOLUTION INTRAVENOUS; SUBCUTANEOUS at 15:45

## 2019-01-06 RX ADMIN — POTASSIUM CHLORIDE 20 MEQ: 750 TABLET, EXTENDED RELEASE ORAL at 05:31

## 2019-01-06 RX ADMIN — INSULIN ASPART 6 UNITS: 100 INJECTION, SOLUTION INTRAVENOUS; SUBCUTANEOUS at 07:44

## 2019-01-06 RX ADMIN — SENNOSIDES AND DOCUSATE SODIUM 2 TABLET: 8.6; 5 TABLET ORAL at 07:35

## 2019-01-06 RX ADMIN — ASPIRIN 81 MG CHEWABLE TABLET 81 MG: 81 TABLET CHEWABLE at 07:35

## 2019-01-06 RX ADMIN — DIGOXIN 125 MCG: 125 TABLET ORAL at 07:34

## 2019-01-06 RX ADMIN — INSULIN ASPART 4 UNITS: 100 INJECTION, SOLUTION INTRAVENOUS; SUBCUTANEOUS at 19:24

## 2019-01-06 RX ADMIN — INSULIN ASPART 3 UNITS: 100 INJECTION, SOLUTION INTRAVENOUS; SUBCUTANEOUS at 12:20

## 2019-01-06 RX ADMIN — PANTOPRAZOLE SODIUM 40 MG: 40 TABLET, DELAYED RELEASE ORAL at 07:35

## 2019-01-06 RX ADMIN — MAGNESIUM SULFATE HEPTAHYDRATE 2 G: 40 INJECTION, SOLUTION INTRAVENOUS at 05:25

## 2019-01-06 RX ADMIN — HEPARIN SODIUM 1500 UNITS/HR: 10000 INJECTION, SOLUTION INTRAVENOUS at 07:36

## 2019-01-06 RX ADMIN — WARFARIN SODIUM 4 MG: 4 TABLET ORAL at 18:56

## 2019-01-06 ASSESSMENT — MIFFLIN-ST. JEOR
SCORE: 1545
SCORE: 1502

## 2019-01-06 ASSESSMENT — ACTIVITIES OF DAILY LIVING (ADL)
ADLS_ACUITY_SCORE: 16

## 2019-01-06 NOTE — PROGRESS NOTES
[facility]  PROGRESS NOTE  January 6, 2019    CO-MORBIDITIES:   Chronic systolic heart failure (H)  (primary encounter diagnosis)  STEMI  ICD  DM     ASSESSMENT: Mariusz Sharp is a 58 year old male now s/p LVAD (HM 3) placement with Dr. Baker on 12/31/2018 for a history of ischemic cardiomyopathy with reduced ejection fraction. There were no major intraoperative events to report and the patient was able to be liberated from cardiopulmonary bypass without significant difficulty. Concern regarding low flow states prompted keeping the patient intubated on 1/1 and on 1/2 following a SELVIN, the patient was able to be extubated. Elevated MAPs required initiating afterload reduction on 1/3. Doing well postoperatively now      TODAY'S PROGRESS:   - low intensity heparin, warfarin till bridging complete   - will add carb coverage given high BGs  - out of bed, ambulate, PT/OT  - keep dobutamine      PLAN:  Neuro/pain/sedation:  - Monitor neurological status. Notify the MD for any acute changes in exam.  - TylenolPRN. Oxy PRN. Robaxin PRN. Will discontinue Dilaudid today   - Lidoderm patches for pain      Pulmonary:   - Supplemental oxygen to keep saturation above 92 %.  - Incentive spirometer every 15- 30 minutes when awake.  - Will obtain ABG and CXR if clinically worsens      Cardiovascular:  #LVAD  - 2x low flow, high PI events, SELVIN 1/2/19 no issues since extubation  - Monitor hemodynamic status.   - Dobutamine 2.5  - MAP > 65.  - PTA amiodarone and digoxin  - Holding metoplolol  - PRN hydralazine for MAP > 90     GI care:   - Carb consistent diet   - Miralax/senna-colace scheulded, suppository PRN  - LFTs downtrending overall     Fluids/ Electrolytes/ Nutrition:   - TKO  - ADAT    Renal:    - Urine output is adequate so far.  - Replete lytes   - Will continue to monitor intake and output.       Endocrine:    - CHO consistent diet   - Insulin sliding scale  - lantus 20u at bedtime today,carb coverage added     ID/  "Antibiotics:  - Periop antibiotics completed     Heme:     - Anticoagulation: heparin low intensity  - warfarin started , INR 1.25  today     Prophylaxis:    - Mechanical prophylaxis for DVT.   - heparin gtt, warfarin   - PPI     Lines/tubes/drains:  - right IV CVC, PIV   - Chest tubes: med x2, left pleural, pericardial tee     Disposition:  -  [facility] ICU, TTF     Benny Pelletier MD   [id number]      ====================================    TODAY'S PROGRESS:   SUBJECTIVE:   - no acute issues overnight,      OBJECTIVE:   1. VITAL SIGNS:   /81 (BP Location: Left arm)   Pulse 80   Temp 98.7  F (37.1  C) (Oral)   Resp 18   Ht 1.626 m (5' 4\")   Wt 76.1 kg (167 lb 12.3 oz)   SpO2 98%   BMI 28.80 kg/m      2. INTAKE/ OUTPUT:   I/O last 3 completed shifts:  Net +30 cc yesterday  UOP: 375 ml last shift     3. PHYSICAL EXAMINATION:   General:   Neuro: A&Ox3, NAD  Resp: Breathing non-labored  CV: regular rate   Abdomen: Soft, Non-distended, Non-tender  Incisions: c/d/i  Extremities: warm and well perfused, trace edema     4. INVESTIGATIONS:   Arterial Blood Gases   Recent Labs   Lab 01/02/19  1416 01/02/19  1258 01/02/19  0812 01/02/19  0339   PH 7.43 7.44 7.38 7.38   PCO2 36 36 41 45   PO2 115* 107* 283* 127*   HCO3 24 24 24 26     Complete Blood Count   Recent Labs   Lab 01/04/19  0356 01/03/19 2040 01/03/19  1227 01/03/19  0959   WBC 5.8 6.4 7.1 6.7   HGB 8.4* 8.7* 8.4* 8.1*   * 101* 107* 98*     Basic Metabolic Panel  Recent Labs   Lab 01/04/19  0356 01/03/19 2040 01/03/19  1227 01/03/19  0959    137 139 137   POTASSIUM 4.2 4.2 4.0 4.6   CHLORIDE 104 105 108 105   CO2 22 20 19* 22   BUN 26 26 23 23   CR 1.36* 1.32* 1.40* 1.61*   * 183* 145* 143*     Liver Function Tests  Recent Labs   Lab 01/04/19  0356 01/03/19  2040 01/03/19  1227 01/03/19  0959  01/02/19  0812  01/01/19  0941   * 163* 183* 195*   < > 385*   < > 35   * 266* 252* 266*   < > 250*   < > 19   ALKPHOS 51 53 49 50 "   < > 47   < > 31*   BILITOTAL 1.2 1.6* 2.3* 2.6*   < > 2.2*   < > 2.2*   ALBUMIN 2.9* 3.0* 2.7* 2.9*   < > 3.0*   < > 3.1*   INR 1.27*  --   --  1.38*  --  1.50*  --  1.39*    < > = values in this interval not displayed.     Pancreatic Enzymes  No lab results found in last 7 days.  Coagulation Profile  Recent Labs   Lab 01/04/19  0356 01/03/19  0959 01/03/19  0336 01/02/19  0812 01/02/19  0339 01/01/19  0941  12/31/18  1302   INR 1.27* 1.38*  --  1.50*  --  1.39*   < > 1.42*   PTT  --   --  50* 48* 55*  --   --  40*    < > = values in this interval not displayed.     Lactate  Invalid input(s): LACTATE    5. RADIOLOGY:   Recent Results (from the past 24 hour(s))   XR Chest Port 1 View    Narrative    XR CHEST PORT 1 VW 1/4/2019 12:30 AM    History: s/p LVAD and extubation    Comparison: 1/3/2019    Findings: Single AP view of the chest. Stable position of the right IJ  [place]-Calvin catheter, with the tip projecting over the right main  pulmonary artery. Stable left chest wall automatic implantable cardiac  defibrillator and leads. Mediastinal drains. Stable position of the  LVAD. Cardiomegaly, bilateral pleural effusions, and bibasilar  opacities are largely unchanged.      Impression    Impression:   1. Stable support devices.  2. Stable bibasilar atelectasis/consolidation and bilateral pleural  effusions.    I have personally reviewed the examination and initial interpretation  and I agree with the findings.    CORINNA PERAZA MD       =========================================

## 2019-01-06 NOTE — PROGRESS NOTES
ADVANCED HEART FAILURE PROGRESS NOTE    SUBJECTIVE:  No acute events. Didn't sleep well last night but otherwise feeling ok.    ROS otherwise negative.    OBJECTIVE:  Vital signs:  Temp: 98.9  F (37.2  C) Temp  Min: 98.3  F (36.8  C)  Max: 98.9  F (37.2  C)  Heart Rate: 84 Heart Rate  Min: 78  Max: 84  BP: 101/81 Systolic (24hrs), Av , Min:93 , Max:112   Diastolic (24hrs), Av, Min:62, Max:94    Resp: 18 Resp  Min: 18  Max: 20  SpO2: 97 % SpO2  Min: 93 %  Max: 98 %    HM3: 5300 rpm, 3.5-4 LPM, PI 3.5-5.5      Intake/Output Summary (Last 24 hours) at 2019 1739  Last data filed at 2019 1600  Gross per 24 hour   Intake 1556.1 ml   Output 1565 ml   Net -8.9 ml       Vitals:    19 0500 19 0100 19 0400   Weight: 76.5 kg (168 lb 11.2 oz) 82.1 kg (180 lb 16 oz) 76.1 kg (167 lb 12.3 oz)       Physical Exam:  Gen: NAD  HEENT: minimally elevated JVP  Resp: coarse bilaterally, no clear wheezes  CVS: normal rate, VAD hum, no JVD  Abdo: soft, nontender  Extremities: warm, no edema   Neuro: non focal      Medications:    IV fluid REPLACEMENT ONLY       DOBUTamine 2.5 mcg/kg/min (19 1600)     HEParin 1,500 Units/hr (19 1600)     - MEDICATION INSTRUCTIONS -       BETA BLOCKER NOT PRESCRIBED       Warfarin Therapy Reminder         Current Facility-Administered Medications   Medication Dose Route Frequency     amiodarone  200 mg Oral Daily     aspirin  81 mg Oral Daily     digoxin  125 mcg Oral Daily     hydrALAZINE  10 mg Oral Q8H CHANCE     influenza vaccine adult (product based on age)  0.5 mL Intramuscular Prior to discharge     [START ON 2019] insulin aspart   Subcutaneous QAM AC     insulin aspart   Subcutaneous Daily with lunch     insulin aspart   Subcutaneous Daily with supper     insulin aspart  1-10 Units Subcutaneous TID AC     insulin aspart  1-7 Units Subcutaneous At Bedtime     insulin glargine  20 Units Subcutaneous At Bedtime     lidocaine  2 patch Transdermal Q24H      lidocaine   Transdermal Q8H     lidocaine   Transdermal Q24h     pantoprazole  40 mg Oral QAM AC     polyethylene glycol  17 g Oral BID     senna-docusate  1 tablet Oral BID    Or     senna-docusate  2 tablet Oral BID     sodium chloride (PF)  10 mL Intracatheter Q8H     sodium chloride (PF)  10 mL Intravenous Once     sodium chloride (PF)  3 mL Intracatheter Q8H     sodium chloride (PF)  3 mL Intracatheter Q8H     warfarin  4 mg Oral ONCE at 18:00         Labs:  CMP  Recent Labs   Lab 01/06/19 0252 01/05/19  1930 01/05/19  1615 01/05/19 0525 01/02/19  0812  12/30/18 2007   NA  --  136 134 138   < > 142   < > 137   POTASSIUM 3.9 4.0 3.7 3.7   < > 5.2   < > 3.6   CHLORIDE  --  105 102 106   < > 111*   < > 102   CO2  --  24 22 24   < > 24   < > 28   BUN  --  22 22 23   < > 19   < > 40*   CR  --  1.29* 0.83 1.36*   < > 1.79*   < > 1.67*   ARLENE  --  7.9* 7.6* 8.4*   < > 8.2*   < > 8.5   MAG 2.0  --   --  2.0   < >  --    < >  --    PHOS 2.5  --   --  1.8*   < >  --    < >  --    GLC  --  237* 282* 162*   < > 152*   < > 280*   LDH  --   --   --   --   --  443*  --  145   ALKPHOS  --  57 49 56   < > 47   < > 43   BILITOTAL  --  0.5 0.7 0.7   < > 2.2*   < > 0.7   AST  --  32 39 58*   < > 385*   < > 20   ALT  --  178* 169* 210*   < > 250*   < > 31    < > = values in this interval not displayed.     BLOOD GAS  Recent Labs   Lab 01/05/19 0525 01/04/19  1746   PH Incorrect specimen type 7.42   PCO2 Incorrect specimen type 21*   PO2 Incorrect specimen type 86     CBC  Recent Labs   Lab 01/06/19 0252 01/05/19 1930   WBC 5.4 5.5   HGB 8.4* 8.9*   HCT 26.8* 28.3*    166     COAG  Recent Labs   Lab 01/06/19  0252 01/05/19  0525  01/03/19  0336 01/02/19  0812   INR 1.25* 1.29*   < >  --  1.50*   PTT  --   --   --  50* 48*    < > = values in this interval not displayed.         ASSESSMENT/PLAN:  58 year old male with a history of ICM, CAD (s/p STEMI with ALYSSA to LAD 6/27/18), monomorphic VT, LV thrombus, CKD Stage  III, paroxysmal AF, DM Type II who was admitted for placement of LVAD. Now s/p HM3 LVAD 12/31.     HeartMate III LVAD placed 12/31:  - speed 5300 RPM, will increase as tolerated  - on ASA 81  - on heparin drip, starting warfarin  - on dobutamine 2.5 for RV support, may try coming off in the next few days  - SELVIN showed moderate-severe RV dysfunction, LV unloaded with inflow cannula in good position  - starting hydralazine 10 TID for hypertension     Ischemic cardiomyopathy, CAD:  - ALYSSA to LAD 6/27/2018  - was not on ACE-I due to CKD  - holding BB after LVAD  - continue rosuvastatin  - was on plavix, holding now after surgery     Paroxysmal afib:  - currently in sinus rhythm  - holding BB after LVAD  - starting heparin drip, warfarin     CKD  - stable with good urine output        Seen and staffed with Dr. Naga Romo MD  Advanced Heart Failure Fellow  [phone]

## 2019-01-06 NOTE — PLAN OF CARE
Dobutamine at 2.5 mcg/kg/min, Heparin at 1500 units/hr. Up to chair x2 with therapy, RN staff. Ambulated in [place]. Carb coverage insulin ordered. Monitoring BGs. LVAD Flows 3 - 7, PI 3 - 4, Power 3 - 4, RPM 5200. CT output 10 - 20 ml/hr. VSS. Floor orders. Awaiting placement.

## 2019-01-06 NOTE — PROGRESS NOTES
CVTS PROGRESS NOTE  January 6, 2019    CO-MORBIDITIES:   Chronic systolic heart failure (H)  (primary encounter diagnosis)  STEMI  ICD  DM     ASSESSMENT: Mariusz Sharp is a 58 year old male now s/p LVAD (HM 3) placement with Dr. Baker on 12/31/2018 for a history of ischemic cardiomyopathy with reduced ejection fraction. There were no major intraoperative events to report and the patient was able to be liberated from cardiopulmonary bypass without significant difficulty. Concern regarding low flow states prompted keeping the patient intubated on 1/1 and on 1/2 following a SELVIN, the patient was able to be extubated. Elevated MAPs required initiating afterload reduction on 1/3. Doing well postoperatively now      TODAY'S PROGRESS:   - low intensity heparin, warfarin till bridging complete   - will add carb coverage given high BGs  - out of bed, ambulate, PT/OT  - keep dobutamine      PLAN:  Neuro/pain/sedation:  - Monitor neurological status. Notify the MD for any acute changes in exam.  - TylenolPRN. Oxy PRN. Robaxin PRN. Will discontinue Dilaudid today   - Lidoderm patches for pain      Pulmonary:   - Supplemental oxygen to keep saturation above 92 %.  - Incentive spirometer every 15- 30 minutes when awake.  - Will obtain ABG and CXR if clinically worsens      Cardiovascular:  #LVAD  - 2x low flow, high PI events, SELVIN 1/2/19 no issues since extubation  - Monitor hemodynamic status.   - Dobutamine 2.5  - MAP > 65.  - PTA amiodarone and digoxin  - Holding metoplolol  - PRN hydralazine for MAP > 90     GI care:   - Carb consistent diet   - Miralax/senna-colace scheulded, suppository PRN  - LFTs downtrending overall     Fluids/ Electrolytes/ Nutrition:   - TKO  - ADAT    Renal:    - Urine output is adequate so far.  - Replete lytes   - Will continue to monitor intake and output.       Endocrine:    - CHO consistent diet   - Insulin sliding scale  - lantus 20u at bedtime today,carb coverage added     ID/ Antibiotics:  -  "Periop antibiotics completed     Heme:     - Anticoagulation: heparin low intensity  - warfarin started , INR 1.25  today     Prophylaxis:    - Mechanical prophylaxis for DVT.   - heparin gtt, warfarin   - PPI     Lines/tubes/drains:  - right IV CVC, PIV   - Chest tubes: med x2, left pleural, pericardial tee     Disposition:  -  [facility] ICU, TTF     Benny Pelletier MD   [phone]      ====================================    TODAY'S PROGRESS:   SUBJECTIVE:   - no acute issues overnight,      OBJECTIVE:   1. VITAL SIGNS:   /68 (BP Location: Left arm)   Pulse 80   Temp 98.7  F (37.1  C) (Oral)   Resp 18   Ht 1.626 m (5' 4\")   Wt 76.1 kg (167 lb 12.3 oz)   SpO2 98%   BMI 28.80 kg/m      2. INTAKE/ OUTPUT:   I/O last 3 completed shifts:  Net +30 cc yesterday  UOP: 375 ml last shift     3. PHYSICAL EXAMINATION:   General:   Neuro: A&Ox3, NAD  Resp: Breathing non-labored  CV: regular rate   Abdomen: Soft, Non-distended, Non-tender  Incisions: c/d/i  Extremities: warm and well perfused, trace edema     4. INVESTIGATIONS:   Arterial Blood Gases   Recent Labs   Lab 01/02/19  1416 01/02/19  1258 01/02/19  0812 01/02/19  0339   PH 7.43 7.44 7.38 7.38   PCO2 36 36 41 45   PO2 115* 107* 283* 127*   HCO3 24 24 24 26     Complete Blood Count   Recent Labs   Lab 01/04/19  0356 01/03/19 2040 01/03/19  1227 01/03/19  0959   WBC 5.8 6.4 7.1 6.7   HGB 8.4* 8.7* 8.4* 8.1*   * 101* 107* 98*     Basic Metabolic Panel  Recent Labs   Lab 01/04/19  0356 01/03/19 2040 01/03/19  1227 01/03/19  0959    137 139 137   POTASSIUM 4.2 4.2 4.0 4.6   CHLORIDE 104 105 108 105   CO2 22 20 19* 22   BUN 26 26 23 23   CR 1.36* 1.32* 1.40* 1.61*   * 183* 145* 143*     Liver Function Tests  Recent Labs   Lab 01/04/19  0356 01/03/19  2040 01/03/19  1227 01/03/19  0959  01/02/19  0812  01/01/19  0941   * 163* 183* 195*   < > 385*   < > 35   * 266* 252* 266*   < > 250*   < > 19   ALKPHOS 51 53 49 50   < > 47   < > " 31*   BILITOTAL 1.2 1.6* 2.3* 2.6*   < > 2.2*   < > 2.2*   ALBUMIN 2.9* 3.0* 2.7* 2.9*   < > 3.0*   < > 3.1*   INR 1.27*  --   --  1.38*  --  1.50*  --  1.39*    < > = values in this interval not displayed.     Pancreatic Enzymes  No lab results found in last 7 days.  Coagulation Profile  Recent Labs   Lab 01/04/19  0356 01/03/19  0959 01/03/19  0336 01/02/19  0812 01/02/19  0339 01/01/19  0941  12/31/18  1302   INR 1.27* 1.38*  --  1.50*  --  1.39*   < > 1.42*   PTT  --   --  50* 48* 55*  --   --  40*    < > = values in this interval not displayed.     Lactate  Invalid input(s): LACTATE    5. RADIOLOGY:   Recent Results (from the past 24 hour(s))   XR Chest Port 1 View    Narrative    XR CHEST PORT 1 VW 1/4/2019 12:30 AM    History: s/p LVAD and extubation    Comparison: 1/3/2019    Findings: Single AP view of the chest. Stable position of the right IJ  [place]-Calvin catheter, with the tip projecting over the right main  pulmonary artery. Stable left chest wall automatic implantable cardiac  defibrillator and leads. Mediastinal drains. Stable position of the  LVAD. Cardiomegaly, bilateral pleural effusions, and bibasilar  opacities are largely unchanged.      Impression    Impression:   1. Stable support devices.  2. Stable bibasilar atelectasis/consolidation and bilateral pleural  effusions.    I have personally reviewed the examination and initial interpretation  and I agree with the findings.    CORINNA PERAZA MD       =========================================  Patient seen and examined.Investigations reviewed. Wean inotropes. Continue current anticoagulation plan. I agree with the findings outlined in the resident's note. I spent a total of 30 minutes examining the patient, reviewing investigations and therapeutic counseling.

## 2019-01-06 NOTE — PLAN OF CARE
Adult Inpatient Plan of Care  Plan of Care Review  1/5/2019 1839 by Donny Rojas, RN  Note  Transfer orders written, remains in CV ICU.  Neuro: Intact.  CV: Hemodynamically stable, paced rhythm in the 80s with HM III support on Dobutamine 2.5 mcg/kg/min. No PRN hydralazine administered this shift. Chest tubes draining small to moderate amounts of fluid.  Pulm: Lungs clear to diminished.   GI/: Tolerates PO intake. S/s insulin. Voiding.  Pain: Controlled with scheduled tylenol and PRN oxycodone.  Activity: Up to chair multiple times this shift. Ambulated with PT in [place].   Access: Right IJ CVC and PAC removed as well as left radial arterial line. Right brachial PICC placed today.

## 2019-01-06 NOTE — PLAN OF CARE
Discharge Planner OT   Patient plan for discharge: unstated  Current status: pt ambulating in hallway 420 feet with V IV pole for support rest break x1 in sitting VSS on RA PI 3.5-4  Barriers to return to prior living situation: post surgical precautions.   Recommendations for discharge: TCU   Rationale for recommendations: pt will require skilled OT/PT to regain functional I and safety. Pt progressing well and may progress to home from this hospital if progression continues.        Entered by: Michael Linn 01/06/2019 3:16 PM

## 2019-01-06 NOTE — PHARMACY-ANTICOAGULATION SERVICE
Clinical Pharmacy - Warfarin Dosing Consult     Pharmacy has been consulted to manage this patient s warfarin therapy.  Indication: LVAD/RVAD  Therapy Goal: INR 2-3  Provider/Team: CVTS  Warfarin Prior to Admission: Yes  Warfarin PTA Regimen: 4 mg po daily  Significant drug interactions: aspirin, oral amiodarone, heparin infusion as bridge to warfarin therapy  Recent documented change in oral intake/nutrition: No  Dose Comments: will dose conservatively as patient has increased liver enzymes + RV defects    INR   Date Value Ref Range Status   01/06/2019 1.25 (H) 0.86 - 1.14 Final   01/05/2019 1.29 (H) 0.86 - 1.14 Final       Recommend warfarin 2 mg today.  Pharmacy will monitor Mariusz PHAN Sharp daily and order warfarin doses to achieve specified goal.      Please contact pharmacy as soon as possible if the warfarin needs to be held for a procedure or if the warfarin goals change.      Jolene Gutierrez PharmD  CVICU and Advanced Heart Failure Pharmacist  Pager [phone]

## 2019-01-07 ENCOUNTER — APPOINTMENT (OUTPATIENT)
Dept: CARDIOLOGY | Facility: CLINIC | Age: 59
End: 2019-01-07
Attending: INTERNAL MEDICINE
Payer: MEDICAID

## 2019-01-07 ENCOUNTER — APPOINTMENT (OUTPATIENT)
Dept: PHYSICAL THERAPY | Facility: CLINIC | Age: 59
End: 2019-01-07
Attending: INTERNAL MEDICINE
Payer: MEDICAID

## 2019-01-07 ENCOUNTER — APPOINTMENT (OUTPATIENT)
Dept: OCCUPATIONAL THERAPY | Facility: CLINIC | Age: 59
End: 2019-01-07
Attending: INTERNAL MEDICINE
Payer: MEDICAID

## 2019-01-07 LAB
ALBUMIN SERPL-MCNC: 2.6 G/DL (ref 3.4–5)
ALP SERPL-CCNC: 49 U/L (ref 40–150)
ALT SERPL W P-5'-P-CCNC: 103 U/L (ref 0–70)
ANION GAP SERPL CALCULATED.3IONS-SCNC: 8 MMOL/L (ref 3–14)
AST SERPL W P-5'-P-CCNC: 18 U/L (ref 0–45)
BILIRUB SERPL-MCNC: 0.4 MG/DL (ref 0.2–1.3)
BUN SERPL-MCNC: 19 MG/DL (ref 7–30)
CALCIUM SERPL-MCNC: 7.9 MG/DL (ref 8.5–10.1)
CHLORIDE SERPL-SCNC: 108 MMOL/L (ref 94–109)
CO2 SERPL-SCNC: 23 MMOL/L (ref 20–32)
CREAT SERPL-MCNC: 1.23 MG/DL (ref 0.66–1.25)
ERYTHROCYTE [DISTWIDTH] IN BLOOD BY AUTOMATED COUNT: 16.1 % (ref 10–15)
GFR SERPL CREATININE-BSD FRML MDRD: 65 ML/MIN/{1.73_M2}
GLUCOSE BLDC GLUCOMTR-MCNC: 154 MG/DL (ref 70–99)
GLUCOSE BLDC GLUCOMTR-MCNC: 158 MG/DL (ref 70–99)
GLUCOSE BLDC GLUCOMTR-MCNC: 175 MG/DL (ref 70–99)
GLUCOSE BLDC GLUCOMTR-MCNC: 175 MG/DL (ref 70–99)
GLUCOSE SERPL-MCNC: 148 MG/DL (ref 70–99)
HCT VFR BLD AUTO: 26.4 % (ref 40–53)
HGB BLD-MCNC: 8.2 G/DL (ref 13.3–17.7)
INR PPP: 1.2 (ref 0.86–1.14)
LMWH PPP CHRO-ACNC: 0.33 IU/ML
MAGNESIUM SERPL-MCNC: 2.2 MG/DL (ref 1.6–2.3)
MCH RBC QN AUTO: 27.8 PG (ref 26.5–33)
MCHC RBC AUTO-ENTMCNC: 31.1 G/DL (ref 31.5–36.5)
MCV RBC AUTO: 90 FL (ref 78–100)
PHOSPHATE SERPL-MCNC: 2.5 MG/DL (ref 2.5–4.5)
PLATELET # BLD AUTO: 216 10E9/L (ref 150–450)
POTASSIUM SERPL-SCNC: 4.1 MMOL/L (ref 3.4–5.3)
PROT SERPL-MCNC: 6 G/DL (ref 6.8–8.8)
RBC # BLD AUTO: 2.95 10E12/L (ref 4.4–5.9)
SODIUM SERPL-SCNC: 139 MMOL/L (ref 133–144)
WBC # BLD AUTO: 7.2 10E9/L (ref 4–11)

## 2019-01-07 PROCEDURE — 97530 THERAPEUTIC ACTIVITIES: CPT | Mod: GP

## 2019-01-07 PROCEDURE — 40000193 ZZH STATISTIC PT WARD VISIT

## 2019-01-07 PROCEDURE — 25000132 ZZH RX MED GY IP 250 OP 250 PS 637: Performed by: THORACIC SURGERY (CARDIOTHORACIC VASCULAR SURGERY)

## 2019-01-07 PROCEDURE — 85610 PROTHROMBIN TIME: CPT | Performed by: PHYSICIAN ASSISTANT

## 2019-01-07 PROCEDURE — 84100 ASSAY OF PHOSPHORUS: CPT | Performed by: PHYSICIAN ASSISTANT

## 2019-01-07 PROCEDURE — 25000128 H RX IP 250 OP 636: Performed by: SURGERY

## 2019-01-07 PROCEDURE — 25000132 ZZH RX MED GY IP 250 OP 250 PS 637: Performed by: SURGERY

## 2019-01-07 PROCEDURE — 97110 THERAPEUTIC EXERCISES: CPT | Mod: GO | Performed by: OCCUPATIONAL THERAPIST

## 2019-01-07 PROCEDURE — 25000132 ZZH RX MED GY IP 250 OP 250 PS 637: Performed by: INTERNAL MEDICINE

## 2019-01-07 PROCEDURE — 21400000 ZZH R&B CCU UMMC

## 2019-01-07 PROCEDURE — 93306 TTE W/DOPPLER COMPLETE: CPT | Mod: 26 | Performed by: INTERNAL MEDICINE

## 2019-01-07 PROCEDURE — 97110 THERAPEUTIC EXERCISES: CPT | Mod: GP

## 2019-01-07 PROCEDURE — 93306 TTE W/DOPPLER COMPLETE: CPT

## 2019-01-07 PROCEDURE — 25000132 ZZH RX MED GY IP 250 OP 250 PS 637

## 2019-01-07 PROCEDURE — 99232 SBSQ HOSP IP/OBS MODERATE 35: CPT | Mod: 25 | Performed by: INTERNAL MEDICINE

## 2019-01-07 PROCEDURE — 25000128 H RX IP 250 OP 636: Performed by: STUDENT IN AN ORGANIZED HEALTH CARE EDUCATION/TRAINING PROGRAM

## 2019-01-07 PROCEDURE — 83735 ASSAY OF MAGNESIUM: CPT | Performed by: PHYSICIAN ASSISTANT

## 2019-01-07 PROCEDURE — 40000133 ZZH STATISTIC OT WARD VISIT: Performed by: OCCUPATIONAL THERAPIST

## 2019-01-07 PROCEDURE — 85027 COMPLETE CBC AUTOMATED: CPT | Performed by: PHYSICIAN ASSISTANT

## 2019-01-07 PROCEDURE — 00000146 ZZHCL STATISTIC GLUCOSE BY METER IP

## 2019-01-07 PROCEDURE — 80053 COMPREHEN METABOLIC PANEL: CPT | Performed by: PHYSICIAN ASSISTANT

## 2019-01-07 PROCEDURE — 85520 HEPARIN ASSAY: CPT | Performed by: PHYSICIAN ASSISTANT

## 2019-01-07 PROCEDURE — 25000132 ZZH RX MED GY IP 250 OP 250 PS 637: Performed by: PHYSICIAN ASSISTANT

## 2019-01-07 PROCEDURE — 97535 SELF CARE MNGMENT TRAINING: CPT | Mod: GO | Performed by: OCCUPATIONAL THERAPIST

## 2019-01-07 RX ORDER — WARFARIN SODIUM 5 MG/1
5 TABLET ORAL
Status: COMPLETED | OUTPATIENT
Start: 2019-01-07 | End: 2019-01-07

## 2019-01-07 RX ADMIN — PANTOPRAZOLE SODIUM 40 MG: 40 TABLET, DELAYED RELEASE ORAL at 07:32

## 2019-01-07 RX ADMIN — HYDRALAZINE HYDROCHLORIDE 10 MG: 10 TABLET, FILM COATED ORAL at 22:08

## 2019-01-07 RX ADMIN — AMIODARONE HYDROCHLORIDE 200 MG: 200 TABLET ORAL at 07:32

## 2019-01-07 RX ADMIN — HYDRALAZINE HYDROCHLORIDE 10 MG: 10 TABLET, FILM COATED ORAL at 06:31

## 2019-01-07 RX ADMIN — DIGOXIN 125 MCG: 125 TABLET ORAL at 07:32

## 2019-01-07 RX ADMIN — INSULIN ASPART 1 UNITS: 100 INJECTION, SOLUTION INTRAVENOUS; SUBCUTANEOUS at 18:26

## 2019-01-07 RX ADMIN — INSULIN ASPART 2 UNITS: 100 INJECTION, SOLUTION INTRAVENOUS; SUBCUTANEOUS at 14:08

## 2019-01-07 RX ADMIN — SENNOSIDES AND DOCUSATE SODIUM 2 TABLET: 8.6; 5 TABLET ORAL at 19:36

## 2019-01-07 RX ADMIN — INSULIN ASPART 1 UNITS: 100 INJECTION, SOLUTION INTRAVENOUS; SUBCUTANEOUS at 07:30

## 2019-01-07 RX ADMIN — HYDRALAZINE HYDROCHLORIDE 10 MG: 10 TABLET, FILM COATED ORAL at 14:07

## 2019-01-07 RX ADMIN — DOBUTAMINE HYDROCHLORIDE 2.5 MCG/KG/MIN: 200 INJECTION INTRAVENOUS at 10:50

## 2019-01-07 RX ADMIN — WARFARIN SODIUM 5 MG: 5 TABLET ORAL at 17:46

## 2019-01-07 RX ADMIN — HEPARIN SODIUM 1500 UNITS/HR: 10000 INJECTION, SOLUTION INTRAVENOUS at 16:00

## 2019-01-07 RX ADMIN — ASPIRIN 81 MG CHEWABLE TABLET 81 MG: 81 TABLET CHEWABLE at 07:32

## 2019-01-07 ASSESSMENT — ACTIVITIES OF DAILY LIVING (ADL)
ADLS_ACUITY_SCORE: 14
ADLS_ACUITY_SCORE: 16
ADLS_ACUITY_SCORE: 14
ADLS_ACUITY_SCORE: 16
ADLS_ACUITY_SCORE: 14
ADLS_ACUITY_SCORE: 16

## 2019-01-07 NOTE — PROGRESS NOTES
CVTS PROGRESS NOTE  January 7, 2019      CO-MORBIDITIES:   Chronic systolic heart failure (H)  (primary encounter diagnosis)  STEMI  ICD  DM     ASSESSMENT: Mariusz Sharp is a 58 year old male now s/p LVAD (HM 3) placement with Dr. Baker on 12/31/2018 for a history of ischemic cardiomyopathy with reduced ejection fraction. There were no major intraoperative events to report and the patient was able to be liberated from cardiopulmonary bypass without significant difficulty. Concern regarding low flow states prompted keeping the patient intubated on 1/1 and on 1/2 following a SELVIN, the patient was able to be extubated. Elevated MAPs required initiating afterload reduction on 1/3. Doing well postoperatively now      TODAY'S PROGRESS:   - low intensity heparin, warfarin till bridging complete   - will add carb coverage given high BGs  - out of bed, ambulate, PT/OT  - keep dobutamine        PLAN:  Neuro/pain/sedation:  - Monitor neurological status. Notify the MD for any acute changes in exam.  - TylenolPRN. Oxy PRN. Robaxin PRN. Will discontinue Dilaudid today   - Lidoderm patches for pain      Pulmonary:   - Supplemental oxygen to keep saturation above 92 %.  - Incentive spirometer every 15- 30 minutes when awake.  - Will obtain ABG and CXR if clinically worsens      Cardiovascular:  #LVAD  - 2x low flow, high PI events, SELVIN 1/2/19 no issues since extubation  - Monitor hemodynamic status.   - Dobutamine 2.5  - MAP > 65.  - PTA amiodarone and digoxin  - Holding metoplolol  - PRN hydralazine for MAP > 90     GI care:   - Carb consistent diet   - Miralax/senna-colace scheulded, suppository PRN  - LFTs downtrending overall     Fluids/ Electrolytes/ Nutrition:   - TKO  - ADAT    Renal:    - Urine output is adequate so far.  - Replete lytes   - Will continue to monitor intake and output.        Endocrine:    - CHO consistent diet   - Insulin sliding scale  - lantus 20u at bedtime today,carb coverage added     ID/  Antibiotics:  - Periop antibiotics completed     Heme:     - Anticoagulation: heparin low intensity  - warfarin started , INR 1.20  today     Prophylaxis:    - Mechanical prophylaxis for DVT.   - heparin gtt, warfarin   - PPI     Lines/tubes/drains:  - right IV CVC, PIV   - Chest tubes: med x2, left pleural, pericardial tee     Disposition:  -  CV ICU, transfer to floor today     Benny Pelletier MD   [phone]                     ====================================    TODAY'S PROGRESS:   SUBJECTIVE:   -  No acute issues overnight       OBJECTIVE:   1. VITAL SIGNS:   Temp:  [98.1  F (36.7  C)-99.4  F (37.4  C)] 98.2  F (36.8  C)  Pulse:  [80-83] 83  Heart Rate:  [81-84] 82  Resp:  [18] 18  BP: (101-105)/(81-91) 101/86  SpO2:  [96 %-97 %] 96 %  Resp: 18      2. INTAKE/ OUTPUT:   I/O last 3 completed shifts:  In: 818.79 [P.O.:480; I.V.:338.79]  Out: 1745 [Urine:1475; Chest Tube:270]    3. PHYSICAL EXAMINATION:   General: In no distress  Neuro: A&Ox3, NAD  Resp: Breathing non-labored  CV: RRR  Abdomen: Soft, Non-distended, Non-tender  Incisions: C/D  Extremities: warm and well perfused    4. INVESTIGATIONS:   Arterial Blood Gases   Recent Labs   Lab 01/05/19  0525 01/04/19  1746 01/04/19  1209 01/02/19  1416   PH Incorrect specimen type 7.42 7.45 7.43   PCO2 Incorrect specimen type 21* 31* 36   PO2 Incorrect specimen type 86 69* 115*   HCO3 Incorrect specimen type 14* 22 24     Complete Blood Count   Recent Labs   Lab 01/07/19  0308 01/06/19  0252 01/05/19  1930 01/05/19  1615   WBC 7.2 5.4 5.5 5.4   HGB 8.2* 8.4* 8.9* 8.4*    168 166 135*     Basic Metabolic Panel  Recent Labs   Lab 01/07/19  0308 01/06/19  0252 01/05/19  1930 01/05/19  1615 01/05/19  0525     --  136 134 138   POTASSIUM 4.1 3.9 4.0 3.7 3.7   CHLORIDE 108  --  105 102 106   CO2 23  --  24 22 24   BUN 19  --  22 22 23   CR 1.23  --  1.29* 0.83 1.36*   *  --  237* 282* 162*     Liver Function Tests  Recent Labs   Lab  01/07/19  0308 01/06/19  0252 01/05/19  1930 01/05/19  1615 01/05/19  0525  01/04/19  0356   AST 18  --  32 39 58*   < > 142*   *  --  178* 169* 210*   < > 269*   ALKPHOS 49  --  57 49 56   < > 51   BILITOTAL 0.4  --  0.5 0.7 0.7   < > 1.2   ALBUMIN 2.6*  --  2.8* 2.5* 2.7*   < > 2.9*   INR 1.20* 1.25*  --   --  1.29*  --  1.27*    < > = values in this interval not displayed.     Pancreatic Enzymes  No lab results found in last 7 days.  Coagulation Profile  Recent Labs   Lab 01/07/19 0308 01/06/19 0252 01/05/19  0525 01/04/19  0356  01/03/19  0336 01/02/19  0812 01/02/19  0339   INR 1.20* 1.25* 1.29* 1.27*   < >  --  1.50*  --    PTT  --   --   --   --   --  50* 48* 55*    < > = values in this interval not displayed.     Lactate  Invalid input(s): LACTATE    5. RADIOLOGY:   No results found for this or any previous visit (from the past 24 hour(s)).    =========================================    Benny Pelletier MD  [phone]

## 2019-01-07 NOTE — PLAN OF CARE
OT/CR 4E: Discharge Planner OT   Patient plan for discharge: not discussed  Current status: Pt is demonstrating continued progress with regards to mobility and independence with self cares.  Pt able to don L sock with SBA, mod A needed for R sock 2/2 limited hip flexibility.  SBA-CGA for functional transfers within precautions, able to ambulate approx 500ft with SBA and 1 UE support on IV pole.  VSS on RA  Barriers to return to prior living situation: acute medical needs, post surgical precautions, activity tolerance, Pt lives with elderly parents, stairs  Recommendations for discharge: TCU  Rationale for recommendations: Currently, pt would benefit from TCU to progress safety and independence with mobility and self cares; however, pt is showing continued progress with therapies and pending LOS may be able to discharge to home with assistance and OP Phase II CR to maximize functional outcomes.         Entered by: Bonnie Painter 01/07/2019 11:56 AM

## 2019-01-07 NOTE — PLAN OF CARE
Vitals stable; no acute changes. See flowsheet for VAD numbers. VAD education today at 0930.    See flowsheet and MAR for details. Report given to RN on 6C, walked to floor with therapy and RN. Continue to monitor and notify MD with any concerns

## 2019-01-07 NOTE — PROGRESS NOTES
ADVANCED HEART FAILURE PROGRESS NOTE    SUBJECTIVE:  No acute events. Slept better last night, no acute complaints.    ROS otherwise negative.    OBJECTIVE:  Vital signs:  Temp: 98.2  F (36.8  C) Temp  Min: 98.1  F (36.7  C)  Max: 99.4  F (37.4  C)  Heart Rate: 90 Heart Rate  Min: 82  Max: 90  BP: 107/85 Systolic (24hrs), Av , Min:101 , Max:110   Diastolic (24hrs), Av, Min:85, Max:91    Resp: 18 Resp  Min: 18  Max: 18  SpO2: 95 % SpO2  Min: 95 %  Max: 97 %      HM3: 5300 RPM, 3.6-3.9 LPM, PI 3.5-4.5    Intake/Output Summary (Last 24 hours) at 2019 1735  Last data filed at 2019 1600  Gross per 24 hour   Intake 852.6 ml   Output 1660 ml   Net -807.4 ml       Vitals:    19 0100 19 0400 19 2227   Weight: 82.1 kg (180 lb 16 oz) 76.1 kg (167 lb 12.3 oz) 80.4 kg (177 lb 4 oz)       Physical Exam:  Gen: comfortable in chair  HEENT: moist mucosa, PERRL  Resp: coarse bilaterally, no clear wheezes  CVS: normal rate, VAD hum, no JVD  Abdo: soft, nontender  Extremities: warm, no edema   Neuro: non focal       Medications:    IV fluid REPLACEMENT ONLY       DOBUTamine 2.5 mcg/kg/min (19 1100)     HEParin 1,500 Units/hr (19 1600)     - MEDICATION INSTRUCTIONS -       BETA BLOCKER NOT PRESCRIBED       Warfarin Therapy Reminder         Current Facility-Administered Medications   Medication Dose Route Frequency     amiodarone  200 mg Oral Daily     aspirin  81 mg Oral Daily     digoxin  125 mcg Oral Daily     hydrALAZINE  10 mg Oral Q8H CHANCE     influenza vaccine adult (product based on age)  0.5 mL Intramuscular Prior to discharge     insulin aspart   Subcutaneous QAM AC     insulin aspart   Subcutaneous Daily with lunch     insulin aspart   Subcutaneous Daily with supper     insulin aspart  1-10 Units Subcutaneous TID AC     insulin aspart  1-7 Units Subcutaneous At Bedtime     insulin glargine  20 Units Subcutaneous At Bedtime     lidocaine  2 patch Transdermal Q24H     lidocaine    Transdermal Q8H     lidocaine   Transdermal Q24h     pantoprazole  40 mg Oral QAM AC     polyethylene glycol  17 g Oral BID     senna-docusate  1 tablet Oral BID    Or     senna-docusate  2 tablet Oral BID     sodium chloride (PF)  10 mL Intracatheter Q8H     sodium chloride (PF)  10 mL Intravenous Once     sodium chloride (PF)  3 mL Intracatheter Q8H     sodium chloride (PF)  3 mL Intracatheter Q8H     warfarin  5 mg Oral ONCE at 18:00         Labs:  CMP  Recent Labs   Lab 01/07/19  0308 01/06/19  0252 01/05/19  1930  01/02/19  0812     --  136   < > 142   POTASSIUM 4.1 3.9 4.0   < > 5.2   CHLORIDE 108  --  105   < > 111*   CO2 23  --  24   < > 24   BUN 19  --  22   < > 19   CR 1.23  --  1.29*   < > 1.79*   ARLENE 7.9*  --  7.9*   < > 8.2*   MAG 2.2 2.0  --    < >  --    PHOS 2.5 2.5  --    < >  --    *  --  237*   < > 152*   LDH  --   --   --   --  443*   ALKPHOS 49  --  57   < > 47   BILITOTAL 0.4  --  0.5   < > 2.2*   AST 18  --  32   < > 385*   *  --  178*   < > 250*    < > = values in this interval not displayed.     BLOOD GAS  Recent Labs   Lab 01/05/19  0525 01/04/19  1746   PH Incorrect specimen type 7.42   PCO2 Incorrect specimen type 21*   PO2 Incorrect specimen type 86     CBC  Recent Labs   Lab 01/07/19 0308 01/06/19  0252   WBC 7.2 5.4   HGB 8.2* 8.4*   HCT 26.4* 26.8*    168     COAG  Recent Labs   Lab 01/07/19  0308 01/06/19  0252 01/03/19  0336 01/02/19  0812   INR 1.20* 1.25*   < >  --  1.50*   PTT  --   --   --  50* 48*    < > = values in this interval not displayed.       ASSESSMENT/PLAN:  58 year old male with a history of ICM, CAD (s/p STEMI with ALYSSA to LAD 6/27/18), monomorphic VT, LV thrombus, CKD Stage III, paroxysmal AF, DM Type II who was admitted for placement of LVAD. Now s/p HM3 LVAD 12/31.     HeartMate III LVAD placed 12/31:  - speed 5300 RPM, will increase as tolerated  - on ASA 81  - on heparin drip, starting warfarin  - on dobutamine 2.5 for RV support,  may try coming off in the next few days  - SELVIN showed moderate-severe RV dysfunction, LV unloaded with inflow cannula in good position  - starting hydralazine 10 TID for hypertension  - appears euvolemic     Ischemic cardiomyopathy, CAD:  - ALYSSA to LAD 6/27/2018  - was not on ACE-I due to CKD  - holding BB after LVAD  - continue rosuvastatin  - was on plavix, holding now after surgery     Paroxysmal afib:  - currently in sinus rhythm  - holding BB after LVAD  - starting heparin drip, warfarin     CKD  - stable with good urine output        Seen and staffed with Dr. Naga Romo MD  Advanced Heart Failure Fellow  [phone]

## 2019-01-07 NOTE — PLAN OF CARE
Discharge Planner PT   Patient plan for discharge: not discussed today  Current status: pt SBA for bed mobility and sit<>stand transfers within sternal precautions to no AD. To encourage independence with VAD, pt instructed through battery<>wall switch with only x1 cue needed. He ambulates 2x200' with and without IV support and performs step ups for LE strengthening. HR 110s, oxygen to 90% on RA, PI 5.7, and /85 with activity.  Barriers to return to prior living situation: medical needs, reduced activity tolerance  Recommendations for discharge: anticipate pt able to return home with assist from family and OP cardiac rehab, pending further progress with therapy.  Rationale for recommendations: Pt currently below baseline level of function and would benefit from ongoing therapy to address the above deficits in order to progress towards PLOF and promote IND mobility.       Entered by: Caitlyn Pena 01/07/2019 4:42 PM

## 2019-01-07 NOTE — PROGRESS NOTES
Cardiac ICU PROGRESS NOTE  January 7, 2019      CO-MORBIDITIES:   Chronic systolic heart failure (H)  (primary encounter diagnosis)  STEMI  ICD  DM     ASSESSMENT: Mariusz Sharp is a 58 year old male now s/p LVAD (HM 3) placement with Dr. Baker on 12/31/2018 for a history of ischemic cardiomyopathy with reduced ejection fraction. There were no major intraoperative events to report and the patient was able to be liberated from cardiopulmonary bypass without significant difficulty. Concern regarding low flow states prompted keeping the patient intubated on 1/1 and on 1/2 following a SELVIN, the patient was able to be extubated. Elevated MAPs required initiating afterload reduction on 1/3. Doing well postoperatively now      TODAY'S PROGRESS:   - low intensity heparin, warfarin till bridging complete   - will add carb coverage given high BGs  - out of bed, ambulate, PT/OT  - keep dobutamine        PLAN:  Neuro/pain/sedation:  - Monitor neurological status. Notify the MD for any acute changes in exam.  - TylenolPRN. Oxy PRN. Robaxin PRN. Will discontinue Dilaudid today   - Lidoderm patches for pain      Pulmonary:   - Supplemental oxygen to keep saturation above 92 %.  - Incentive spirometer every 15- 30 minutes when awake.  - Will obtain ABG and CXR if clinically worsens      Cardiovascular:  #LVAD  - 2x low flow, high PI events, SELVIN 1/2/19 no issues since extubation  - Monitor hemodynamic status.   - Dobutamine 2.5  - MAP > 65.  - PTA amiodarone and digoxin  - Holding metoplolol  - PRN hydralazine for MAP > 90     GI care:   - Carb consistent diet   - Miralax/senna-colace scheulded, suppository PRN  - LFTs downtrending overall     Fluids/ Electrolytes/ Nutrition:   - TKO  - ADAT    Renal:    - Urine output is adequate so far.  - Replete lytes   - Will continue to monitor intake and output.        Endocrine:    - CHO consistent diet   - Insulin sliding scale  - lantus 20u at bedtime today,carb coverage added     ID/  Antibiotics:  - Periop antibiotics completed     Heme:     - Anticoagulation: heparin low intensity  - warfarin started , INR 1.20  today     Prophylaxis:    - Mechanical prophylaxis for DVT.   - heparin gtt, warfarin   - PPI     Lines/tubes/drains:  - right IV CVC, PIV   - Chest tubes: med x2, left pleural, pericardial tee     Disposition:  -  CV ICU, transfer to floor today     Benny Pelletier MD   [phone]                     ====================================    TODAY'S PROGRESS:   SUBJECTIVE:   -  No acute issues overnight       OBJECTIVE:   1. VITAL SIGNS:   Temp:  [98.7  F (37.1  C)-99.4  F (37.4  C)] 99.4  F (37.4  C)  Pulse:  [80] 80  Heart Rate:  [80-84] 84  Resp:  [18-20] 18  BP: ()/(68-91) 105/91  SpO2:  [96 %-98 %] 97 %  Resp: 18      2. INTAKE/ OUTPUT:   I/O last 3 completed shifts:  In: 1125.45 [P.O.:480; I.V.:645.45]  Out: 1730 [Urine:1450; Chest Tube:280]    3. PHYSICAL EXAMINATION:   General: In no distress  Neuro: A&Ox3, NAD  Resp: Breathing non-labored  CV: RRR  Abdomen: Soft, Non-distended, Non-tender  Incisions: C/D  Extremities: warm and well perfused    4. INVESTIGATIONS:   Arterial Blood Gases   Recent Labs   Lab 01/05/19  0525 01/04/19  1746 01/04/19  1209 01/02/19  1416   PH Incorrect specimen type 7.42 7.45 7.43   PCO2 Incorrect specimen type 21* 31* 36   PO2 Incorrect specimen type 86 69* 115*   HCO3 Incorrect specimen type 14* 22 24     Complete Blood Count   Recent Labs   Lab 01/07/19  0308 01/06/19  0252 01/05/19  1930 01/05/19  1615   WBC 7.2 5.4 5.5 5.4   HGB 8.2* 8.4* 8.9* 8.4*    168 166 135*     Basic Metabolic Panel  Recent Labs   Lab 01/07/19  0308 01/06/19  0252 01/05/19  1930 01/05/19  1615 01/05/19  0525     --  136 134 138   POTASSIUM 4.1 3.9 4.0 3.7 3.7   CHLORIDE 108  --  105 102 106   CO2 23  --  24 22 24   BUN 19  --  22 22 23   CR 1.23  --  1.29* 0.83 1.36*   *  --  237* 282* 162*     Liver Function Tests  Recent Labs   Lab  01/07/19  0308 01/06/19  0252 01/05/19  1930 01/05/19  1615 01/05/19  0525  01/04/19  0356   AST 18  --  32 39 58*   < > 142*   *  --  178* 169* 210*   < > 269*   ALKPHOS 49  --  57 49 56   < > 51   BILITOTAL 0.4  --  0.5 0.7 0.7   < > 1.2   ALBUMIN 2.6*  --  2.8* 2.5* 2.7*   < > 2.9*   INR 1.20* 1.25*  --   --  1.29*  --  1.27*    < > = values in this interval not displayed.     Pancreatic Enzymes  No lab results found in last 7 days.  Coagulation Profile  Recent Labs   Lab 01/07/19 0308 01/06/19 0252 01/05/19  0525 01/04/19  0356  01/03/19  0336 01/02/19  0812 01/02/19  0339  12/31/18  1302   INR 1.20* 1.25* 1.29* 1.27*   < >  --  1.50*  --    < > 1.42*   PTT  --   --   --   --   --  50* 48* 55*  --  40*    < > = values in this interval not displayed.     Lactate  Invalid input(s): LACTATE    5. RADIOLOGY:   No results found for this or any previous visit (from the past 24 hour(s)).    =========================================    Benny Pelletier MD  [phone]    Patient seen and examined.Investigations reviewed. Increase pump speed. Continue current anticoagulation plan. I agree with the findings outlined in the resident's note. I spent a total of 30 minutes examining the patient, reviewing investigations and therapeutic counseling.

## 2019-01-08 ENCOUNTER — APPOINTMENT (OUTPATIENT)
Dept: GENERAL RADIOLOGY | Facility: CLINIC | Age: 59
End: 2019-01-08
Attending: INTERNAL MEDICINE
Payer: MEDICAID

## 2019-01-08 ENCOUNTER — DOCUMENTATION ONLY (OUTPATIENT)
Dept: CARDIOLOGY | Facility: CLINIC | Age: 59
End: 2019-01-08

## 2019-01-08 ENCOUNTER — APPOINTMENT (OUTPATIENT)
Dept: PHYSICAL THERAPY | Facility: CLINIC | Age: 59
End: 2019-01-08
Attending: INTERNAL MEDICINE
Payer: MEDICAID

## 2019-01-08 LAB
ALBUMIN SERPL-MCNC: 2.6 G/DL (ref 3.4–5)
ALP SERPL-CCNC: 47 U/L (ref 40–150)
ALT SERPL W P-5'-P-CCNC: 73 U/L (ref 0–70)
ANION GAP SERPL CALCULATED.3IONS-SCNC: 8 MMOL/L (ref 3–14)
AST SERPL W P-5'-P-CCNC: 12 U/L (ref 0–45)
BILIRUB SERPL-MCNC: 0.4 MG/DL (ref 0.2–1.3)
BUN SERPL-MCNC: 21 MG/DL (ref 7–30)
CALCIUM SERPL-MCNC: 8.2 MG/DL (ref 8.5–10.1)
CHLORIDE SERPL-SCNC: 108 MMOL/L (ref 94–109)
CO2 SERPL-SCNC: 22 MMOL/L (ref 20–32)
CREAT SERPL-MCNC: 1.19 MG/DL (ref 0.66–1.25)
ERYTHROCYTE [DISTWIDTH] IN BLOOD BY AUTOMATED COUNT: 16.5 % (ref 10–15)
GFR SERPL CREATININE-BSD FRML MDRD: 68 ML/MIN/{1.73_M2}
GLUCOSE BLDC GLUCOMTR-MCNC: 130 MG/DL (ref 70–99)
GLUCOSE BLDC GLUCOMTR-MCNC: 143 MG/DL (ref 70–99)
GLUCOSE BLDC GLUCOMTR-MCNC: 176 MG/DL (ref 70–99)
GLUCOSE BLDC GLUCOMTR-MCNC: 191 MG/DL (ref 70–99)
GLUCOSE SERPL-MCNC: 141 MG/DL (ref 70–99)
HCT VFR BLD AUTO: 26.5 % (ref 40–53)
HGB BLD-MCNC: 8.2 G/DL (ref 13.3–17.7)
INR PPP: 1.21 (ref 0.86–1.14)
LDH SERPL L TO P-CCNC: 113 U/L (ref 85–227)
LMWH PPP CHRO-ACNC: 0.27 IU/ML
MCH RBC QN AUTO: 27.9 PG (ref 26.5–33)
MCHC RBC AUTO-ENTMCNC: 30.9 G/DL (ref 31.5–36.5)
MCV RBC AUTO: 90 FL (ref 78–100)
PLATELET # BLD AUTO: 270 10E9/L (ref 150–450)
POTASSIUM SERPL-SCNC: 4.2 MMOL/L (ref 3.4–5.3)
PROT SERPL-MCNC: 6 G/DL (ref 6.8–8.8)
RADIOLOGIST FLAGS: ABNORMAL
RBC # BLD AUTO: 2.94 10E12/L (ref 4.4–5.9)
SODIUM SERPL-SCNC: 138 MMOL/L (ref 133–144)
WBC # BLD AUTO: 7.9 10E9/L (ref 4–11)

## 2019-01-08 PROCEDURE — 25000132 ZZH RX MED GY IP 250 OP 250 PS 637: Performed by: PHYSICIAN ASSISTANT

## 2019-01-08 PROCEDURE — 71045 X-RAY EXAM CHEST 1 VIEW: CPT

## 2019-01-08 PROCEDURE — 85610 PROTHROMBIN TIME: CPT | Performed by: PHYSICIAN ASSISTANT

## 2019-01-08 PROCEDURE — 00000146 ZZHCL STATISTIC GLUCOSE BY METER IP

## 2019-01-08 PROCEDURE — 85027 COMPLETE CBC AUTOMATED: CPT | Performed by: PHYSICIAN ASSISTANT

## 2019-01-08 PROCEDURE — 40000193 ZZH STATISTIC PT WARD VISIT

## 2019-01-08 PROCEDURE — 40000802 ZZH SITE CHECK

## 2019-01-08 PROCEDURE — 83615 LACTATE (LD) (LDH) ENZYME: CPT | Performed by: PHYSICIAN ASSISTANT

## 2019-01-08 PROCEDURE — 97530 THERAPEUTIC ACTIVITIES: CPT | Mod: GP

## 2019-01-08 PROCEDURE — 99232 SBSQ HOSP IP/OBS MODERATE 35: CPT | Mod: GC | Performed by: INTERNAL MEDICINE

## 2019-01-08 PROCEDURE — 25000132 ZZH RX MED GY IP 250 OP 250 PS 637

## 2019-01-08 PROCEDURE — 25000128 H RX IP 250 OP 636: Performed by: STUDENT IN AN ORGANIZED HEALTH CARE EDUCATION/TRAINING PROGRAM

## 2019-01-08 PROCEDURE — 25000132 ZZH RX MED GY IP 250 OP 250 PS 637: Performed by: INTERNAL MEDICINE

## 2019-01-08 PROCEDURE — 85520 HEPARIN ASSAY: CPT | Performed by: PHYSICIAN ASSISTANT

## 2019-01-08 PROCEDURE — 25000132 ZZH RX MED GY IP 250 OP 250 PS 637: Performed by: THORACIC SURGERY (CARDIOTHORACIC VASCULAR SURGERY)

## 2019-01-08 PROCEDURE — 25000132 ZZH RX MED GY IP 250 OP 250 PS 637: Performed by: SURGERY

## 2019-01-08 PROCEDURE — 21400000 ZZH R&B CCU UMMC

## 2019-01-08 PROCEDURE — 80053 COMPREHEN METABOLIC PANEL: CPT | Performed by: PHYSICIAN ASSISTANT

## 2019-01-08 PROCEDURE — 71046 X-RAY EXAM CHEST 2 VIEWS: CPT

## 2019-01-08 RX ORDER — WARFARIN SODIUM 7.5 MG/1
7.5 TABLET ORAL
Status: COMPLETED | OUTPATIENT
Start: 2019-01-08 | End: 2019-01-08

## 2019-01-08 RX ORDER — POTASSIUM CHLORIDE 750 MG/1
20 TABLET, EXTENDED RELEASE ORAL ONCE
Status: COMPLETED | OUTPATIENT
Start: 2019-01-08 | End: 2019-01-08

## 2019-01-08 RX ORDER — FUROSEMIDE 20 MG
20 TABLET ORAL ONCE
Status: COMPLETED | OUTPATIENT
Start: 2019-01-08 | End: 2019-01-08

## 2019-01-08 RX ORDER — HYDRALAZINE HYDROCHLORIDE 25 MG/1
25 TABLET, FILM COATED ORAL EVERY 8 HOURS SCHEDULED
Status: DISCONTINUED | OUTPATIENT
Start: 2019-01-08 | End: 2019-01-08

## 2019-01-08 RX ADMIN — PANTOPRAZOLE SODIUM 40 MG: 40 TABLET, DELAYED RELEASE ORAL at 08:23

## 2019-01-08 RX ADMIN — INSULIN ASPART 1 UNITS: 100 INJECTION, SOLUTION INTRAVENOUS; SUBCUTANEOUS at 12:59

## 2019-01-08 RX ADMIN — ACETAMINOPHEN 650 MG: 325 TABLET, FILM COATED ORAL at 04:43

## 2019-01-08 RX ADMIN — HEPARIN SODIUM 1500 UNITS/HR: 10000 INJECTION, SOLUTION INTRAVENOUS at 09:38

## 2019-01-08 RX ADMIN — HYDRALAZINE HYDROCHLORIDE 10 MG: 10 TABLET, FILM COATED ORAL at 06:03

## 2019-01-08 RX ADMIN — POTASSIUM CHLORIDE 20 MEQ: 750 TABLET, EXTENDED RELEASE ORAL at 11:36

## 2019-01-08 RX ADMIN — DIGOXIN 125 MCG: 125 TABLET ORAL at 08:13

## 2019-01-08 RX ADMIN — ASPIRIN 81 MG CHEWABLE TABLET 81 MG: 81 TABLET CHEWABLE at 08:13

## 2019-01-08 RX ADMIN — Medication 12.5 MG: at 18:39

## 2019-01-08 RX ADMIN — INSULIN ASPART 3 UNITS: 100 INJECTION, SOLUTION INTRAVENOUS; SUBCUTANEOUS at 19:06

## 2019-01-08 RX ADMIN — SENNOSIDES AND DOCUSATE SODIUM 1 TABLET: 8.6; 5 TABLET ORAL at 22:40

## 2019-01-08 RX ADMIN — FUROSEMIDE 20 MG: 20 TABLET ORAL at 11:35

## 2019-01-08 RX ADMIN — HYDRALAZINE HYDROCHLORIDE 10 MG: 20 INJECTION INTRAMUSCULAR; INTRAVENOUS at 03:58

## 2019-01-08 RX ADMIN — Medication 12.5 MG: at 11:35

## 2019-01-08 RX ADMIN — OXYCODONE HYDROCHLORIDE 5 MG: 5 TABLET ORAL at 08:27

## 2019-01-08 RX ADMIN — WARFARIN SODIUM 7.5 MG: 7.5 TABLET ORAL at 18:39

## 2019-01-08 RX ADMIN — AMIODARONE HYDROCHLORIDE 200 MG: 200 TABLET ORAL at 08:13

## 2019-01-08 RX ADMIN — Medication 12.5 MG: at 23:50

## 2019-01-08 RX ADMIN — HYDRALAZINE HYDROCHLORIDE 10 MG: 20 INJECTION INTRAMUSCULAR; INTRAVENOUS at 08:13

## 2019-01-08 ASSESSMENT — ACTIVITIES OF DAILY LIVING (ADL)
ADLS_ACUITY_SCORE: 12
ADLS_ACUITY_SCORE: 14

## 2019-01-08 NOTE — PLAN OF CARE
D/I/A: Pt here s/p HM3 implant. DL dressing changed. Noted same amount of rash, covered w/ gauze, coordinator aware. VSS, VAD #s WNL. IV hydralazine once for map>90.  P: Continue to monitor.

## 2019-01-08 NOTE — PROGRESS NOTES
"CLINICAL NUTRITION SERVICES - REASSESSMENT NOTE     Nutrition Prescription    RECOMMENDATIONS FOR MDs/PROVIDERS TO ORDER:  None at this time.     Recommendations already ordered by Registered Dietitian (RD):  Scheduled snack  Prn supplement order    Future/Additional Recommendations:  1. Continue current diet at this time. If limiting oral intake, then rec liberalize diet order.   2. Monitor BG control. Hgb A1c of 10.1 on 12/30. DM II hx.   3. Consider scheduling antiemetics/antinauseants ~20 minutes prior to meals to help optimize nausea control.     4. Order a multivitamin with minerals if oral intake is not improving.      EVALUATION OF THE PROGRESS TOWARD GOALS   Diet: High Consistent Carbohydrate since 1/5. Previously on a 2 g sodium diet 12/29, NPO 12/31, extubated 1/2, regular diet 1/3, and then low consistent carbohydrate diet 1/3.   Intake: Good diet tolerance. Flowsheets indicate pt consuming 100% of meals with a fair appetite 1/3, 0-75% of meals with a fair appetite 1/4, 100% of meals 1/5, 50-75% of meals 1/6, and 100% of meals with a good appetite 1/7-1/8. Decreased appetite initially post-op. Per pt, his appetite had improved until yesterday when he had \"heart pain.\" States this resolved today (1/8) and he was able to eat lunch. He did have nausea a couple times but this has resolved. Per pt, the nausea may have been due to miralax. States he does have some early satiety.     Nutrition support: No TFs post-op.     NEW FINDINGS   Wt Hx: 79.8 kg (2/3/17), 79.1 kg (7/16/18), 78.5 kg (10/17/18), 76.6 kg (12/30, admit), 80.4 kg (1/6/19) - Wt may vary, pending changes in fluid status. No significant or severe wt loss PTA.     MALNUTRITION  % Intake: < 75% for > 7 days (non-severe)  % Weight Loss: Not meeting this criteria.     Subcutaneous Fat Loss: None observed  Muscle Loss: None observed  Fluid Accumulation/Edema: Not meeting this criteria.     Malnutrition Diagnosis: Patient does not meet two of the above " criteria necessary for diagnosing malnutrition but is at risk for malnutrition    Previous Goals   Diet adv v nutrition support within 24 hours  Evaluation: Met    Previous Nutrition Diagnosis  Inadequate protein-energy intake related to NPO on vent without nutrition support as evidenced by NPO x 48 hours with no plans for nutrition support at this time (pending extubation)  Evaluation: Improving, but unresolved. Changed to new nutrition dx below.     CURRENT NUTRITION DIAGNOSIS  Inadequate oral intake related to decreased appetite and early satiety as evidenced by pt consuming 0-50% of meals at times.       INTERVENTIONS  Implementation  Medical food supplement therapy, Modify composition of meals/snacks: Placed prn snack/supplement order. Pt may request oral supplements prn. Provided pt with an oral supplement menu. Scheduled a snack at HS, string cheese with pineapple (or grapes).     Nutrition Education  1. Provided instruction on the need to eat small, frequent meals. Importance of adequate oral intake post-op, especially for 6-8 weeks, was discussed.  2. Provided handouts (room service handout and Nutrition Care Manual handouts) regarding the amounts of protein in various food items. Discussed protein goals with pt.     Goals  Patient to consume % of nutritionally adequate meal trays TID, or the equivalent with supplements/snacks.    Monitoring/Evaluation  Progress toward goals will be monitored and evaluated per protocol.     Nutrition will continue to follow.      Dorcas Meek, MS, RD, LD, [facility]   6C Pgr: [phone]

## 2019-01-08 NOTE — PROGRESS NOTES
ADVANCED HEART FAILURE PROGRESS NOTE    SUBJECTIVE:  Overnight had alarm for high PI, low flows in the setting of , improved with BP reduction.    ROS otherwise negative.    OBJECTIVE:  Vital signs:  Temp: 98.5  F (36.9  C) Temp  Min: 98  F (36.7  C)  Max: 98.9  F (37.2  C)  Heart Rate: 92 Heart Rate  Min: 77  Max: 92  BP: 106/86 Systolic (24hrs), Av , Min:93 , Max:117   Diastolic (24hrs), Av, Min:80, Max:99    Resp: 16 Resp  Min: 16  Max: 18  SpO2: 97 % SpO2  Min: 95 %  Max: 98 %    HM3: 5300 RPM, 3.3-3.7 LPM, PI 4-5      Intake/Output Summary (Last 24 hours) at 2019 0841  Last data filed at 2019 0700  Gross per 24 hour   Intake 716.1 ml   Output 1983 ml   Net -1266.9 ml       Vitals:    19 0100 19 0400 19 2227   Weight: 82.1 kg (180 lb 16 oz) 76.1 kg (167 lb 12.3 oz) 80.4 kg (177 lb 4 oz)       Physical Exam:  Gen: comfortable in chair  HEENT: moist mucosa, PERRL  Resp: coarse bilaterally, no clear wheezes  CVS: normal rate, VAD hum, no JVD  Abdo: soft, nontender  Extremities: warm, no edema   Neuro: non focal      Medications:    IV fluid REPLACEMENT ONLY       DOBUTamine 2.5 mcg/kg/min (19)     HEParin 1,500 Units/hr (19)     - MEDICATION INSTRUCTIONS -       BETA BLOCKER NOT PRESCRIBED       Warfarin Therapy Reminder         Current Facility-Administered Medications   Medication Dose Route Frequency     amiodarone  200 mg Oral Daily     aspirin  81 mg Oral Daily     digoxin  125 mcg Oral Daily     hydrALAZINE  10 mg Oral Q8H CHANCE     influenza vaccine adult (product based on age)  0.5 mL Intramuscular Prior to discharge     insulin aspart   Subcutaneous QAM AC     insulin aspart   Subcutaneous Daily with lunch     insulin aspart   Subcutaneous Daily with supper     insulin aspart  1-10 Units Subcutaneous TID AC     insulin aspart  1-7 Units Subcutaneous At Bedtime     insulin glargine  20 Units Subcutaneous At Bedtime     lidocaine  2 patch  Transdermal Q24H     lidocaine   Transdermal Q8H     lidocaine   Transdermal Q24h     pantoprazole  40 mg Oral QAM AC     polyethylene glycol  17 g Oral BID     senna-docusate  1 tablet Oral BID    Or     senna-docusate  2 tablet Oral BID     sodium chloride (PF)  10 mL Intracatheter Q8H     sodium chloride (PF)  10 mL Intravenous Once     sodium chloride (PF)  3 mL Intracatheter Q8H     sodium chloride (PF)  3 mL Intracatheter Q8H     warfarin  7.5 mg Oral ONCE at 18:00         Labs:  CMP  Recent Labs   Lab 01/08/19 0434 01/07/19  0308 01/06/19  0252  01/02/19  0812    139  --    < > 142   POTASSIUM 4.2 4.1 3.9   < > 5.2   CHLORIDE 108 108  --    < > 111*   CO2 22 23  --    < > 24   BUN 21 19  --    < > 19   CR 1.19 1.23  --    < > 1.79*   ARLENE 8.2* 7.9*  --    < > 8.2*   MAG  --  2.2 2.0   < >  --    PHOS  --  2.5 2.5   < >  --    * 148*  --    < > 152*   LDH  --   --   --   --  443*   ALKPHOS 47 49  --    < > 47   BILITOTAL 0.4 0.4  --    < > 2.2*   AST 12 18  --    < > 385*   ALT 73* 103*  --    < > 250*    < > = values in this interval not displayed.     BLOOD GAS  Recent Labs   Lab 01/05/19  0525 01/04/19  1746   PH Incorrect specimen type 7.42   PCO2 Incorrect specimen type 21*   PO2 Incorrect specimen type 86     CBC  Recent Labs   Lab 01/08/19 0434 01/07/19 0308   WBC 7.9 7.2   HGB 8.2* 8.2*   HCT 26.5* 26.4*    216     COAG  Recent Labs   Lab 01/08/19 0434 01/07/19 0308 01/03/19  0336 01/02/19  0812   INR 1.21* 1.20*   < >  --  1.50*   PTT  --   --   --  50* 48*    < > = values in this interval not displayed.         ASSESSMENT/PLAN:  58 year old male with a history of ICM, CAD (s/p STEMI with ALYSSA to LAD 6/27/18), monomorphic VT, LV thrombus, CKD Stage III, paroxysmal AF, DM Type II who was admitted for placement of LVAD. Now s/p HM3 LVAD 12/31.     HeartMate III LVAD placed 12/31:  - speed 5300 RPM, will increase as tolerated, may increase to 5400 if tolerates being off  dobutamine  - on ASA 81  - on heparin drip, starting warfarin  - on dobutamine 2.5 for RV support, will trial off today  - SELVIN showed moderate-severe RV dysfunction, LV unloaded with inflow cannula in good position  - TTE with improved RV function  - increase hydralazine to 12.5 q6h for hypertension  - on lasix 20mg daily     Ischemic cardiomyopathy, CAD:  - ALYSSA to LAD 6/27/2018  - was not on ACE-I due to CKD  - holding BB after LVAD  - continue rosuvastatin  - was on plavix, holding now after surgery     Paroxysmal afib:  - currently in sinus rhythm  - holding BB after LVAD  - starting heparin drip, warfarin     CKD  - stable with good urine output        Seen and staffed with Dr. Miroslava Romo MD  Advanced Heart Failure Fellow  [phone]

## 2019-01-08 NOTE — PLAN OF CARE
D/I/A: Pt here s/p HM3 LVAD implant. Dobutamine @ 2.5mcg/kg/min. Heparin @ 1500U/hr. Xa therapeutic. DL dressing and CT dressings changed. Small ecchymotic spots noted on dressing margin at 9 o'clock; gauze extended to prevent tape irritation in that area. Pt not complaining of pain. Paced rhythm, up w/ SBA, voiding in urinal, VSS.  P: Continue to monitor.

## 2019-01-08 NOTE — PLAN OF CARE
Discharge Planner PT   Patient plan for discharge: Home with outpatient cardiac rehab  Current status: Pt is IND with ambulation, stairs, and bed mobility. Pt only requires IV pole and line management. Pt has met PT goals and no longer requires PT at this time.  Barriers to return to prior living situation: Medical stability   Recommendations for discharge: Home with outpatient cardiac rehab  Rationale for recommendations: Pt would benefit from outpatient cardiac rehab in order to improve overall activity tolerance.        Entered by: Lizzeth Watts 01/08/2019 2:38 PM

## 2019-01-08 NOTE — PROGRESS NOTES
CVTS Daily Note  1/8/2019  Attending: Kamaljit Baker MD    S:   Overnight events- low flows with high PIs last evening with L sided chest pain/ache, constant with occasional sharp flare, was 6/10 now 3/10 with oxycodone.  Feeling better this afternoon.   Coming off dobutamine tonight.     Pt seen at bedside resting comfortably.    Does complain of L sided deep chest ache, otherwise no acute complaints.      Denies F/C/Sweats.  No CP, SOB, or calf pain.    Tolerating diet.  + BM    Ambulated well without assistance.    Pain Plan as per Neuro section below.     O:   Vitals:    01/07/19 2348 01/08/19 0352 01/08/19 0425 01/08/19 0807   BP: 99/86 (!) 117/99 93/80 106/86   BP Location: Left arm Left arm Left arm    Pulse: 84      Resp: 16 16 16 16   Temp: 98  F (36.7  C) 98.9  F (37.2  C)  98.5  F (36.9  C)   TempSrc: Oral Oral  Oral   SpO2: 96% 98%  97%   Weight:       Height:         Vitals:    01/05/19 0100 01/06/19 0400 01/06/19 2227   Weight: 82.1 kg (180 lb 16 oz) 76.1 kg (167 lb 12.3 oz) 80.4 kg (177 lb 4 oz)     + 4 kg since admit and trending up      Intake/Output Summary (Last 24 hours) at 1/8/2019 0846  Last data filed at 1/8/2019 0700  Gross per 24 hour   Intake 716.1 ml   Output 1983 ml   Net -1266.9 ml       MAPs: 86 - 104   Gen: AAO x 3, pleasant, NAD  CV: LVAD humming, RRR, S1S2 normal, no murmurs, rubs, or gallops.   Pulm: CTA, no rhonchi or wheezes  Abd: soft, non-tender, no guarding  Ext: no peripheral edema  Incision: clean, dry, intact, no erythema  Chest Tube sites: dressings clean and dry, serosanguinous, no air leak, output 310 cc   Lines: driveline dressing clean and dry   LVAD: speed 5300, flow 2.4 - 3.7, PI 3.8 - 10.7, power 3.8 - 3.9       Labs:  BMP  Recent Labs   Lab 01/08/19  0434 01/07/19  0308 01/06/19  0252 01/05/19  1930 01/05/19  1615    139  --  136 134   POTASSIUM 4.2 4.1 3.9 4.0 3.7   CHLORIDE 108 108  --  105 102   ARLENE 8.2* 7.9*  --  7.9* 7.6*   CO2 22 23  --  24 22   BUN 21 19   --  22 22   CR 1.19 1.23  --  1.29* 0.83   * 148*  --  237* 282*     CBC  Recent Labs   Lab 01/08/19  0434 01/07/19  0308 01/06/19  0252 01/05/19  1930   WBC 7.9 7.2 5.4 5.5   RBC 2.94* 2.95* 3.01* 3.17*   HGB 8.2* 8.2* 8.4* 8.9*   HCT 26.5* 26.4* 26.8* 28.3*   MCV 90 90 89 89   MCH 27.9 27.8 27.9 28.1   MCHC 30.9* 31.1* 31.3* 31.4*   RDW 16.5* 16.1* 15.9* 15.7*    216 168 166     INR  Recent Labs   Lab 01/08/19 0434 01/07/19  0308 01/06/19  0252 01/05/19  0525   INR 1.21* 1.20* 1.25* 1.29*      Hepatic Panel   Lab Results   Component Value Date    AST 12 01/08/2019     Lab Results   Component Value Date    ALT 73 01/08/2019     Lab Results   Component Value Date    ALBUMIN 2.6 01/08/2019     GLUCOSE:   Recent Labs   Lab 01/08/19  0434 01/07/19  2116 01/07/19  1741 01/07/19  1157 01/07/19  0723 01/07/19  0308 01/06/19  2237 01/06/19  1910  01/05/19  1930  01/05/19  1615  01/05/19  0525 01/04/19  2128   *  --   --   --   --  148*  --   --   --  237*  --  282*  --  162* 184*   BGM  --  175* 154* 175* 158*  --  217* 231*   < >  --    < >  --    < >  --   --     < > = values in this interval not displayed.        Ref. Range 12/30/2018 20:07 1/2/2019 08:12 1/8/2019 09:45   Lactate Dehydrogenase Latest Ref Range: 85 - 227 U/L   145   443 (H)   113     Imaging:    ECHO 1/7-   LVAD cannula was seen in the expected anatomic position in the LV apex.  HM3 at 5300RPM.  LVIDd 49mm.  Septum normal position. Aortic valve open with each systole.  Normal outflow velocity.  Global right ventricular function is mildly reduced.  The inferior vena cava is normal.  No pericardial effusion is present.    CXR 1/8-   Stable, no acute findings     A/P:   Mariusz Sharp is a 58 year old male now s/p LVAD (HM 3) placement with Dr. Baker on 12/31/2018 for a history of ischemic cardiomyopathy with reduced ejection fraction. There were no major intraoperative events to report and the patient was able to be liberated from  cardiopulmonary bypass without significant difficulty. Concern regarding low flow states prompted keeping the patient intubated on 1/1 and on 1/2 following a SELVIN, the patient was able to be extubated. Elevated MAPs required initiating afterload reduction on 1/3 and increased frequency 1/8.      Neuro:   - Neuro intact  - Tylenol, oxycodone, robaxin PRN for pain.    CV:   - ICM NYHA class IV stage D. LVEF <20%, mild RV dysfunction (improved on dobutamine gtt). S/P HeartMate III.   - No arrhythmia, HD stable.  Low flows likely related to increased afterload.  - ASA 81 mg, digoxin 125 mcg, hydralazine 12.5 mg QID.    - Dobutamine gtt wean per Cards II, off 1/8      Pulm:   - Pulm toilet, IS, activity and deep breathing   - Supplemental O2 PRN to keep sats > 92%, wean as tolerated.    ID:   - WBC WNL, afebrile, no signs or symptoms of infection    GI / FEN:  - Carb consistent diet, bowel regimen, PRN suppository      Renal / :   - Creatinine WNL, adequate UOP.   - Diuresis PRN. Lasix 20 mg PO 1/8.    - Fluid status remains euvolemic     Heme:   - Hgb 8.2, Plts WNL    Endo:   - Lantus 20 U q PM, prandial dosing 1 U per 12 grams Carb, high corrective sliding scale    PPX:   - Protonix    Anticoagulation:   - Low intensity Hep gtt. Coumadin for INR, goal 2-3.  - INR 1.21     Dispo:   - 6C since 1/7/18  - PICC right arm, remove at discharge       Discussed with CVTS Fellow   Staff surgeons have been informed of changes through both  verbal and written communication.      Guillermo Ortiz PA-C  Cardiothoracic Surgery  Pager [phone]    8:46 AM   January 8, 2019    Patient seen and examined.Investigations reviewed.  Continue current anticoagulation plan. I agree with the findings outlined in the ANNE-MARIE note. I spent a total of 25 minutes examining the patient, reviewing investigations and therapeutic counseling.

## 2019-01-08 NOTE — PLAN OF CARE
D: admitted with cardiogenic shock. HM 3 since 12/31/18. Hx V-tach, systolic HF, ICM, CAD, DM2, CKD.    I/A: A&Ox4. VSSA, on room air. Adequate UO. C/o incisional pain, PRN tylenol given with moderate relief. LVAD alarmed x 2 for low flow/high PI. Alarms likely due to high MAP (104), PRN hydralazine given x 1 resulting in new MAP (86) with no alarms since. CT x2 to suction. Right triple lumen PICC, heparin gtt @ 1500 unit(s)/hr, Dobutamine gtt @ 2.5 mcg/kg/min. Right PIV, saline locked.    P: Continue to monitor patient. Keep MAPs below 90. Notify CVTS with pertinent changes.

## 2019-01-09 ENCOUNTER — APPOINTMENT (OUTPATIENT)
Dept: OCCUPATIONAL THERAPY | Facility: CLINIC | Age: 59
End: 2019-01-09
Attending: INTERNAL MEDICINE
Payer: MEDICAID

## 2019-01-09 ENCOUNTER — APPOINTMENT (OUTPATIENT)
Dept: GENERAL RADIOLOGY | Facility: CLINIC | Age: 59
End: 2019-01-09
Attending: INTERNAL MEDICINE
Payer: MEDICAID

## 2019-01-09 LAB
ANION GAP SERPL CALCULATED.3IONS-SCNC: 8 MMOL/L (ref 3–14)
BUN SERPL-MCNC: 27 MG/DL (ref 7–30)
CALCIUM SERPL-MCNC: 8.3 MG/DL (ref 8.5–10.1)
CHLORIDE SERPL-SCNC: 105 MMOL/L (ref 94–109)
CO2 SERPL-SCNC: 24 MMOL/L (ref 20–32)
CREAT SERPL-MCNC: 1.19 MG/DL (ref 0.66–1.25)
ERYTHROCYTE [DISTWIDTH] IN BLOOD BY AUTOMATED COUNT: 16.7 % (ref 10–15)
GFR SERPL CREATININE-BSD FRML MDRD: 68 ML/MIN/{1.73_M2}
GLUCOSE BLDC GLUCOMTR-MCNC: 128 MG/DL (ref 70–99)
GLUCOSE BLDC GLUCOMTR-MCNC: 162 MG/DL (ref 70–99)
GLUCOSE BLDC GLUCOMTR-MCNC: 204 MG/DL (ref 70–99)
GLUCOSE BLDC GLUCOMTR-MCNC: 211 MG/DL (ref 70–99)
GLUCOSE BLDC GLUCOMTR-MCNC: 257 MG/DL (ref 70–99)
GLUCOSE SERPL-MCNC: 154 MG/DL (ref 70–99)
HCT VFR BLD AUTO: 27.9 % (ref 40–53)
HGB BLD-MCNC: 8.6 G/DL (ref 13.3–17.7)
INR PPP: 1.26 (ref 0.86–1.14)
LMWH PPP CHRO-ACNC: 0.22 IU/ML
MCH RBC QN AUTO: 27.9 PG (ref 26.5–33)
MCHC RBC AUTO-ENTMCNC: 30.8 G/DL (ref 31.5–36.5)
MCV RBC AUTO: 91 FL (ref 78–100)
PLATELET # BLD AUTO: 357 10E9/L (ref 150–450)
POTASSIUM SERPL-SCNC: 4.2 MMOL/L (ref 3.4–5.3)
RBC # BLD AUTO: 3.08 10E12/L (ref 4.4–5.9)
SODIUM SERPL-SCNC: 137 MMOL/L (ref 133–144)
WBC # BLD AUTO: 9.1 10E9/L (ref 4–11)

## 2019-01-09 PROCEDURE — 25000132 ZZH RX MED GY IP 250 OP 250 PS 637: Performed by: SURGERY

## 2019-01-09 PROCEDURE — 40000133 ZZH STATISTIC OT WARD VISIT

## 2019-01-09 PROCEDURE — 85520 HEPARIN ASSAY: CPT | Performed by: PHYSICIAN ASSISTANT

## 2019-01-09 PROCEDURE — 85027 COMPLETE CBC AUTOMATED: CPT | Performed by: PHYSICIAN ASSISTANT

## 2019-01-09 PROCEDURE — 97110 THERAPEUTIC EXERCISES: CPT | Mod: GO

## 2019-01-09 PROCEDURE — 25000128 H RX IP 250 OP 636: Performed by: STUDENT IN AN ORGANIZED HEALTH CARE EDUCATION/TRAINING PROGRAM

## 2019-01-09 PROCEDURE — 25000132 ZZH RX MED GY IP 250 OP 250 PS 637: Performed by: PHYSICIAN ASSISTANT

## 2019-01-09 PROCEDURE — 97535 SELF CARE MNGMENT TRAINING: CPT | Mod: GO

## 2019-01-09 PROCEDURE — 21400000 ZZH R&B CCU UMMC

## 2019-01-09 PROCEDURE — 71045 X-RAY EXAM CHEST 1 VIEW: CPT

## 2019-01-09 PROCEDURE — 40000802 ZZH SITE CHECK

## 2019-01-09 PROCEDURE — 00000146 ZZHCL STATISTIC GLUCOSE BY METER IP

## 2019-01-09 PROCEDURE — 80048 BASIC METABOLIC PNL TOTAL CA: CPT | Performed by: PHYSICIAN ASSISTANT

## 2019-01-09 PROCEDURE — 99232 SBSQ HOSP IP/OBS MODERATE 35: CPT | Mod: GC | Performed by: INTERNAL MEDICINE

## 2019-01-09 PROCEDURE — 25000132 ZZH RX MED GY IP 250 OP 250 PS 637: Performed by: THORACIC SURGERY (CARDIOTHORACIC VASCULAR SURGERY)

## 2019-01-09 PROCEDURE — 25000132 ZZH RX MED GY IP 250 OP 250 PS 637

## 2019-01-09 PROCEDURE — 36592 COLLECT BLOOD FROM PICC: CPT | Performed by: PHYSICIAN ASSISTANT

## 2019-01-09 PROCEDURE — 85610 PROTHROMBIN TIME: CPT | Performed by: PHYSICIAN ASSISTANT

## 2019-01-09 RX ORDER — ACETAMINOPHEN 325 MG/1
650 TABLET ORAL EVERY 6 HOURS PRN
Status: ON HOLD | DISCHARGE
Start: 2019-01-09 | End: 2019-01-15

## 2019-01-09 RX ORDER — AMOXICILLIN 250 MG
1 CAPSULE ORAL 2 TIMES DAILY
Qty: 100 TABLET | DISCHARGE
Start: 2019-01-09 | End: 2019-01-11

## 2019-01-09 RX ORDER — FUROSEMIDE 40 MG
40 TABLET ORAL ONCE
Status: COMPLETED | OUTPATIENT
Start: 2019-01-09 | End: 2019-01-09

## 2019-01-09 RX ORDER — POTASSIUM CHLORIDE 750 MG/1
20 TABLET, EXTENDED RELEASE ORAL ONCE
Status: COMPLETED | OUTPATIENT
Start: 2019-01-09 | End: 2019-01-09

## 2019-01-09 RX ORDER — WARFARIN SODIUM 7.5 MG/1
7.5 TABLET ORAL
Status: COMPLETED | OUTPATIENT
Start: 2019-01-09 | End: 2019-01-09

## 2019-01-09 RX ORDER — ASPIRIN 81 MG/1
81 TABLET, CHEWABLE ORAL DAILY
Qty: 120 TABLET | Refills: 0 | Status: ON HOLD | DISCHARGE
Start: 2019-01-10 | End: 2019-01-15

## 2019-01-09 RX ORDER — METHOCARBAMOL 750 MG/1
750 TABLET, FILM COATED ORAL 3 TIMES DAILY PRN
Qty: 30 TABLET | Status: ON HOLD | DISCHARGE
Start: 2019-01-09 | End: 2019-01-15

## 2019-01-09 RX ORDER — HYDRALAZINE HYDROCHLORIDE 10 MG/1
10 TABLET, FILM COATED ORAL EVERY 4 HOURS PRN
Status: DISCONTINUED | OUTPATIENT
Start: 2019-01-09 | End: 2019-01-11 | Stop reason: HOSPADM

## 2019-01-09 RX ORDER — HYDRALAZINE HYDROCHLORIDE 25 MG/1
25 TABLET, FILM COATED ORAL 3 TIMES DAILY
Qty: 60 TABLET | Refills: 0 | DISCHARGE
Start: 2019-01-09 | End: 2019-01-10

## 2019-01-09 RX ORDER — ROSUVASTATIN CALCIUM 20 MG/1
20 TABLET, COATED ORAL DAILY
Status: DISCONTINUED | OUTPATIENT
Start: 2019-01-10 | End: 2019-01-11 | Stop reason: HOSPADM

## 2019-01-09 RX ORDER — POLYETHYLENE GLYCOL 3350 17 G/17G
17 POWDER, FOR SOLUTION ORAL DAILY PRN
Qty: 14 PACKET | Refills: 0 | Status: ON HOLD | DISCHARGE
Start: 2019-01-09 | End: 2019-01-15

## 2019-01-09 RX ADMIN — HEPARIN SODIUM 1500 UNITS/HR: 10000 INJECTION, SOLUTION INTRAVENOUS at 20:23

## 2019-01-09 RX ADMIN — DIGOXIN 125 MCG: 125 TABLET ORAL at 09:14

## 2019-01-09 RX ADMIN — AMIODARONE HYDROCHLORIDE 200 MG: 200 TABLET ORAL at 09:14

## 2019-01-09 RX ADMIN — HEPARIN SODIUM 1500 UNITS/HR: 10000 INJECTION, SOLUTION INTRAVENOUS at 02:52

## 2019-01-09 RX ADMIN — POTASSIUM CHLORIDE 20 MEQ: 750 TABLET, EXTENDED RELEASE ORAL at 09:16

## 2019-01-09 RX ADMIN — WARFARIN SODIUM 7.5 MG: 7.5 TABLET ORAL at 17:51

## 2019-01-09 RX ADMIN — ASPIRIN 81 MG CHEWABLE TABLET 81 MG: 81 TABLET CHEWABLE at 09:13

## 2019-01-09 RX ADMIN — Medication 5 ML: at 06:15

## 2019-01-09 RX ADMIN — HYDRALAZINE HYDROCHLORIDE 10 MG: 20 INJECTION INTRAMUSCULAR; INTRAVENOUS at 03:58

## 2019-01-09 RX ADMIN — LIDOCAINE 2 PATCH: 560 PATCH PERCUTANEOUS; TOPICAL; TRANSDERMAL at 09:14

## 2019-01-09 RX ADMIN — Medication 12.5 MG: at 23:33

## 2019-01-09 RX ADMIN — SENNOSIDES AND DOCUSATE SODIUM 1 TABLET: 8.6; 5 TABLET ORAL at 09:14

## 2019-01-09 RX ADMIN — FUROSEMIDE 40 MG: 40 TABLET ORAL at 09:16

## 2019-01-09 RX ADMIN — Medication 12.5 MG: at 11:41

## 2019-01-09 RX ADMIN — INSULIN ASPART 3 UNITS: 100 INJECTION, SOLUTION INTRAVENOUS; SUBCUTANEOUS at 13:26

## 2019-01-09 RX ADMIN — Medication 12.5 MG: at 05:45

## 2019-01-09 RX ADMIN — Medication 12.5 MG: at 17:51

## 2019-01-09 RX ADMIN — INSULIN ASPART 1 UNITS: 100 INJECTION, SOLUTION INTRAVENOUS; SUBCUTANEOUS at 09:13

## 2019-01-09 RX ADMIN — PANTOPRAZOLE SODIUM 40 MG: 40 TABLET, DELAYED RELEASE ORAL at 09:14

## 2019-01-09 ASSESSMENT — ACTIVITIES OF DAILY LIVING (ADL)
ADLS_ACUITY_SCORE: 12
ADLS_ACUITY_SCORE: 14
ADLS_ACUITY_SCORE: 12
ADLS_ACUITY_SCORE: 14

## 2019-01-09 ASSESSMENT — MIFFLIN-ST. JEOR: SCORE: 1533.89

## 2019-01-09 NOTE — PLAN OF CARE
D: Pt presents this admission with heart failure now s/p LVAD HM III on 12/31/18. Hx of ICM, Vtach, systolic HF, CAD, DM2 and CKD.     I/A: Pt alert and oriented x4. Pt paced rhythm with HRs 70-80s. Please refer to flowsheets for VAD #s. Dressing change done by coordinator today. Pt appetite good. BS this shift 162 and 204. Sliding scale insulin given 2x. Carb coverage given 1x. Last BM yesterday. PO lasix given, pt voiding. 1 CT pulled this AM; remaining CT continue to bulb suction. Net output -20 ml. Dressing change due tomorrow. CXR done at bedside. Pt worked with OT this morning, tolerated well. INR this morning 1.26, pharmacy to order evening dose of warfarin. Hep gtt continued therapeutically at 1500 units/hr.     P: Continue to monitor and assess pt with every encounter. Notify Cards 2 with any changes or questions.

## 2019-01-09 NOTE — PLAN OF CARE
D: admitted with cardiogenic shock. HM 3 since 12/31/18. Hx V-tach, systolic HF, ICM, CAD, DM2, CKD.     I/A: A&Ox4. VSSA, on room air. Adequate UO. Denies pain. PRN hydralazine given x 1 for MAP >90. CT x2 to suction. Right triple lumen PICC, heparin gtt @ 1500 unit(s)/hr. Right PIV, saline locked.     P: Continue to monitor patient. Keep MAPs below 90. Notify CVTS with pertinent changes.

## 2019-01-09 NOTE — DISCHARGE INSTRUCTIONS
[facility]      AFTER YOU GO HOME FROM YOUR HEART SURGERY    You had a sternotomy, avoid lifting anything greater than ten pounds for 6 weeks after surgery and then less than 20 pounds for an additional 6 weeks.  No driving for 4 weeks after surgery or while on pain medication AND cleared by LVAD team.    Avoid strenuous activities such as bowling, vacuuming, raking, shoveling, golf or tennis for 12 weeks after your surgery. It is okay to resume sex if you feel comfortable in doing so. You may have to try different positions with your partner.     Splint your chest incision by hugging a pillow or bringing your arms across your chest when coughing or sneezing. Avoid pushing off with your arms when getting up for the first month if you have had your sternum opened.    Keep wound clean and dry. No showers, baths, or swimming. Cover chest tube sites with gauze until they stop draining, then leave open to air. It is not abnormal for chest tube sites to drain yellowish/clear fluid for up to 2-3 weeks after surgery.   Watch for signs of infection: increased redness, tenderness, warmth or any drainage that appears infected (pus like) or is persistent.  Also a temperature > 100.5 F or chills. Call your surgeon or primary care provider's office immediately. Remove any skin glue left on incisions after 10-14 days. This will not affect your incision and can speed up healing.    Exercise is very important in your recovery. Please follow the guidelines set up for you in your cardiac rehab classes at the hospital. If outpatient cardiac rehab was ordered for you, we highly recommend you participate. If you have problems arranging your cardiac rehab, please call [phone].     Avoid sitting for prolonged periods of time, try to walk every hour during the day. If you have a leg incision, elevate your leg often when you are not walking.    Check your weight when you get home from the hospital and  continue to check it daily through your recovery for at least a month. If you notice a weight gain of 2-3 pounds in a week, notify your primary care physician, cardiologist or surgeon.    Bowel activity may be slow after surgery. If necessary, you may take an over the counter laxative such as Milk of Magnesia or Miralax. You may have stool softeners prescribed (docusate sodium, Senokot). We recommend using stool softeners while using narcotics for pain (oxycodone/percocet, hydrocodone/vicodin, hydromorphone/dilaudid).      Wean OFF of narcotics (oxycodone, dilaudid, hydrocodone) as soon as possible. You should continue taking acetaminophen as long as you have any surgical pain as the first choice for pain control and add narcotics as necessary for pain to be tolerable.      DO NOT SMOKE.  IF YOU NEED HELP QUITTING, PLEASE TALK WITH YOUR CARDIOLOGIST OR PRIMARY DOCTOR.    You are on a blood thinner (coumadin/warfarin), follow the instructions you were given in the hospital and DO NOT SKIP this medication. Try and take it the same time everyday. Your primary care physician or coumadin clinic will manage the dosing. INR goal is 2-3 for LVAD.     You have an LVAD device, so call your LVAD coordinator with all questions and concerns.  No swimming, showering, or baths. Daily sterile driveline dressing changes as instructed.     REGARDING PRESCRIPTION REFILLS.  If you need a refill on your pain medication contact us to discuss your pain and a possible one time refill.   All other medications will be adjusted, discontinued and re-filled by your primary care physician and/or your cardiologist as they were prior to your surgery. We have given you enough for one to three month with possibly one refill.    POST-OPERATIVE CLINIC VISITS  You will now return to the care of your primary physician and your cardiologist.   Your LVAD coordinator will help set up all future appts, lab draws, etc.   It is important to see your  cardiologist about 1-2 weeks after discharge and your primary care physician in 2-4 weeks after discharge. If you do not hear from a  in 7 days, please call [phone] (choose option 1) and request to be seen with a general cardiologist or someone that you have seen in the past.   If there is a need to return to see CT Surgery please call our  at [phone].    SURGICAL QUESTIONS  Please call Taylor Dey or Sammi Jacob with surgical recovery and medication questions, the phone number is listed below.  They can assist you with your needs and contact other surgery care team members as indicated.    On weekends or after hours, please call [phone] and ask the  to   page the Cardiothoracic Surgery fellow on call.      Thank you,    Your Cardiothoracic Surgery Team  Taylor Dey RN Care Coordinator-  [phone]   Sammi Jacob RN Care Coordinator-  [phone]  Christine BURGESSC

## 2019-01-09 NOTE — PROGRESS NOTES
ADVANCED HEART FAILURE PROGRESS NOTE    SUBJECTIVE:  No overnight events.    ROS otherwise negative.    OBJECTIVE:  Vital signs:  Temp: 98.3  F (36.8  C) Temp  Min: 97.9  F (36.6  C)  Max: 98.5  F (36.9  C)  Heart Rate: 76 Heart Rate  Min: 74  Max: 92  BP: 111/87 Systolic (24hrs), Av , Min:92 , Max:111   Diastolic (24hrs), Av, Min:55, Max:93    Resp: 18 Resp  Min: 16  Max: 18  SpO2: 94 % SpO2  Min: 94 %  Max: 97 %      Intake/Output Summary (Last 24 hours) at 2019 0802  Last data filed at 2019 0659  Gross per 24 hour   Intake 619.75 ml   Output 1142 ml   Net -522.25 ml       Vitals:    19 0400 19 2227 19 0740   Weight: 76.1 kg (167 lb 12.3 oz) 80.4 kg (177 lb 4 oz) 79.3 kg (174 lb 12.8 oz)       Physical Exam:  Gen: comfortable in chair  HEENT: moist mucosa, PERRL  Resp: coarse bilaterally, no clear wheezes  CVS: normal rate, VAD hum, no JVD  Abdo: soft, nontender  Extremities: warm, no edema   Neuro: non focal       Medications:    IV fluid REPLACEMENT ONLY       HEParin 1,500 Units/hr (19 0252)     - MEDICATION INSTRUCTIONS -       BETA BLOCKER NOT PRESCRIBED       Warfarin Therapy Reminder         Current Facility-Administered Medications   Medication Dose Route Frequency     amiodarone  200 mg Oral Daily     aspirin  81 mg Oral Daily     digoxin  125 mcg Oral Daily     hydrALAZINE  12.5 mg Oral Q6H CHANCE     influenza vaccine adult (product based on age)  0.5 mL Intramuscular Prior to discharge     insulin aspart   Subcutaneous QAM AC     insulin aspart   Subcutaneous Daily with lunch     insulin aspart   Subcutaneous Daily with supper     insulin aspart  1-10 Units Subcutaneous TID AC     insulin aspart  1-7 Units Subcutaneous At Bedtime     insulin glargine  20 Units Subcutaneous At Bedtime     lidocaine  2 patch Transdermal Q24H     lidocaine   Transdermal Q8H     lidocaine   Transdermal Q24h     pantoprazole  40 mg Oral QAM AC     polyethylene glycol  17 g Oral BID      senna-docusate  1 tablet Oral BID    Or     senna-docusate  2 tablet Oral BID     sodium chloride (PF)  10 mL Intracatheter Q8H     sodium chloride (PF)  10 mL Intravenous Once     sodium chloride (PF)  3 mL Intracatheter Q8H     sodium chloride (PF)  3 mL Intracatheter Q8H         Labs:  CMP  Recent Labs   Lab 01/09/19 0633 01/08/19  0945 01/08/19  0434 01/07/19  0308 01/06/19  0252  01/02/19  0812     --  138 139  --    < > 142   POTASSIUM 4.2  --  4.2 4.1 3.9   < > 5.2   CHLORIDE 105  --  108 108  --    < > 111*   CO2 24  --  22 23  --    < > 24   BUN 27  --  21 19  --    < > 19   CR 1.19  --  1.19 1.23  --    < > 1.79*   ARLENE 8.3*  --  8.2* 7.9*  --    < > 8.2*   MAG  --   --   --  2.2 2.0   < >  --    PHOS  --   --   --  2.5 2.5   < >  --    *  --  141* 148*  --    < > 152*   LDH  --  113  --   --   --   --  443*   ALKPHOS  --   --  47 49  --    < > 47   BILITOTAL  --   --  0.4 0.4  --    < > 2.2*   AST  --   --  12 18  --    < > 385*   ALT  --   --  73* 103*  --    < > 250*    < > = values in this interval not displayed.     BLOOD GAS  Recent Labs   Lab 01/05/19  0525 01/04/19  1746   PH Incorrect specimen type 7.42   PCO2 Incorrect specimen type 21*   PO2 Incorrect specimen type 86     CBC  Recent Labs   Lab 01/09/19 0633 01/08/19  0434   WBC 9.1 7.9   HGB 8.6* 8.2*   HCT 27.9* 26.5*    270     COAG  Recent Labs   Lab 01/09/19  0633 01/08/19  0434  01/03/19  0336 01/02/19  0812   INR 1.26* 1.21*   < >  --  1.50*   PTT  --   --   --  50* 48*    < > = values in this interval not displayed.         ASSESSMENT/PLAN:  58 year old male with a history of ICM, CAD (s/p STEMI with ALYSSA to LAD 6/27/18), monomorphic VT, LV thrombus, CKD Stage III, paroxysmal AF, DM Type II who was admitted for placement of LVAD. Now s/p HM3 LVAD 12/31.     HeartMate III LVAD placed 12/31:  - speed 5300 RPM, will increase as tolerated, may increase to 5400 if tolerates being off dobutamine  - on ASA 81  - on heparin  drip, starting warfarin  - on dobutamine 2.5 for RV support, will trial off today  - SELVIN showed moderate-severe RV dysfunction, LV unloaded with inflow cannula in good position  - TTE with improved RV function  - increased hydralazine to 12.5 q6h for hypertension  - on lasix 20mg daily     Ischemic cardiomyopathy, CAD:  - ALYSSA to LAD 6/27/2018  - was not on ACE-I due to CKD  - holding BB after LVAD  - continue rosuvastatin  - was on plavix, holding now after surgery     Paroxysmal afib:  - currently in sinus rhythm  - holding BB after LVAD  - starting heparin drip, warfarin     CKD  - stable with good urine output        Seen and staffed with Dr. Miroslava Romo MD  Advanced Heart Failure Fellow  [phone]

## 2019-01-09 NOTE — DISCHARGE SUMMARY
[facility], [place]   Cardiothoracic Surgery Hospital Discharge Summary     Mariusz Sharp MRN# [id number]   Age: 58 year old YOB: 1960     Admitting Physician:  Nasreen Nielson MD  Discharge Physician:  TATYANA Mo  Primary Care Physician:        Patricia Irving     DATE OF ADMISSION: 12/29/2018     DATE OF DISCHARGE: January 11, 2019          Primary Diagnoses:   1. ICM NYHA class IV stage D, LVEF <20%. S/P LVAD placement 12/31/18.  2. Cardiogenic shock  3. ANDREW on CKD stage III, resolving   4. HTN           Secondary Diagnoses:   1. Paroxysmal atrial flutter   2. Hyperlipidemia   3. DMT2, insulin dependent   4. Hx CAD  5. Hx Ventricular tachycardia and ICD placement     PROCEDURES PERFORMED:   Date: 12/31/2018.  Surgeon: Dr. Kamaljit Baker   Placement of HeartMate 3 left ventricular assist device (intracorporeal left ventricular assist device).      OPERATIVE FINDINGS:    The patient's sternum was of adequate quality.  The pericardial space had moderate adhesions, probably secondary to his myocardial infarction.  There was no aortic regurgitation.  There was no patent foramen ovale.     INTRAOPERATIVE COMPLICATIONS:  none    PATHOLOGY RESULTS:  none     CULTURE RESULTS:  none    DRAINS/TUBES PRESENT AT DISCHARGE:  none    CONSULTS:    1.  PT/OT  2.  Cardiology     BRIEF HISTORY OF ILLNESS:  The patient is a 58-year-old gentleman with a past medical history of coronary artery disease (s/p STEMI with ALYSSA to LAD 6/27/18), status post myocardial infarction, now with ischemic cardiomyopathy with congestive heart failure.  Decision was made to proceed with LVAD placement.    HOSPITAL COURSE: Mariusz Sharp is a 58 year old male who on 12/31/2018 underwent the above-named procedures.  He tolerated the operation well and postoperatively was admitted to the CVICU. He was given Plts, Cryo, FFP in the OR, 1 unit PRBC given POD #1. He was started on a dobutamine gtt for  "RV support on POD #2.   He was extubated on POD # 2 to 3 lpm via NC with neuro status intact.  His ICU stay was otherwise uncomplicated and treatment included the usual hemodynamic support. RV function improved on dobutamine and we was stable to transfer to the telemonitoring unit.    He was transferred to the post-surgical telemetry unit on 1/7/19.  Chest tubes were removed without incident. He continued LVAD training. Low intensity heparin gtt was used as bridging to therapeutic INR, INR sonali slowly during his stay. He had good insulin coverage for DMT2. Transient creatinine spike kept him in the hospital for an extra day, creatinine improved on day of discharge.      Prior to discharge, his pain was controlled well, he was able to perform most ADLs and ambulate without difficulty, and had full return of bowel and bladder function.  On January 11, 2019, he was discharged to [facility] in stable condition.    Patient discharged on aspirin:  Yes 81 mg  Patient discharged on beta blocker: no new LVAD   Patient discharged on ACE Inhibitor/ARB: no  ANDREW (hydralazine used)            Discharge Disposition:   Discharged to rehabilitation facility            Condition on Discharge:   Discharge condition: Stable   Discharge vitals: Blood pressure 92/67, pulse 84, temperature 97.8  F (36.6  C), temperature source Oral, resp. rate 18, height 1.626 m (5' 4\"), weight 79.3 kg (174 lb 12.8 oz), SpO2 97 %.     Code status on discharge: Full Code     Vitals:    01/06/19 0400 01/06/19 2227 01/09/19 0740   Weight: 76.1 kg (167 lb 12.3 oz) 80.4 kg (177 lb 4 oz) 79.3 kg (174 lb 12.8 oz)       DAY OF DISCHARGE PHYSICAL EXAM:    MAPs: 88 - 91  Gen:  NAD, conversational  CV: LVAD humming, no murmurs, rubs, or gallops  Pulm:  CTA, no rhonchi or wheezes  Abd:  Soft, nondistended, NTTP  Ext: no dependent edema  Incision: Clean, dry, intact, no erythema  Chest Tube sites: Dressings clean and dry  Lines: Drive line dressing clean and " dry      BMP  Recent Labs   Lab 01/09/19  0633 01/08/19  0434 01/07/19  0308 01/06/19  0252 01/05/19  1930    138 139  --  136   POTASSIUM 4.2 4.2 4.1 3.9 4.0   CHLORIDE 105 108 108  --  105   ARLENE 8.3* 8.2* 7.9*  --  7.9*   CO2 24 22 23  --  24   BUN 27 21 19  --  22   CR 1.19 1.19 1.23  --  1.29*   * 141* 148*  --  237*     CBC  Recent Labs   Lab 01/09/19  0633 01/08/19  0434 01/07/19  0308 01/06/19  0252   WBC 9.1 7.9 7.2 5.4   RBC 3.08* 2.94* 2.95* 3.01*   HGB 8.6* 8.2* 8.2* 8.4*   HCT 27.9* 26.5* 26.4* 26.8*   MCV 91 90 90 89   MCH 27.9 27.9 27.8 27.9   MCHC 30.8* 30.9* 31.1* 31.3*   RDW 16.7* 16.5* 16.1* 15.9*    270 216 168     INR  Recent Labs   Lab 01/09/19  0633 01/08/19  0434 01/07/19  0308 01/06/19  0252   INR 1.26* 1.21* 1.20* 1.25*      Hepatic Panel   Lab Results   Component Value Date    AST 12 01/08/2019     Lab Results   Component Value Date    ALT 73 01/08/2019     Lab Results   Component Value Date    ALBUMIN 2.6 01/08/2019     Recent Labs   Lab 01/09/19  1206 01/09/19  0808 01/09/19  0633 01/08/19  2133 01/08/19  1758 01/08/19  1229 01/08/19  0950 01/08/19  0434  01/07/19  0308  01/05/19  1930  01/05/19  1615  01/05/19  0525   GLC  --   --  154*  --   --   --   --  141*  --  148*  --  237*  --  282*  --  162*   * 162*  --  176* 191* 143* 130*  --    < >  --    < >  --    < >  --    < >  --     < > = values in this interval not displayed.       DISCHARGE INSTRUCTIONS:  You had a sternotomy, avoid lifting anything greater than ten pounds for 6 weeks after surgery and then less than 20 pounds for an additional 6 weeks.  No driving for 4 weeks after surgery or while on pain medication AND cleared by LVAD team.    Avoid strenuous activities such as bowling, vacuuming, raking, shoveling, golf or tennis for 12 weeks after your surgery. It is okay to resume sex if you feel comfortable in doing so. You may have to try different positions with your partner.     Splint your chest  incision by hugging a pillow or bringing your arms across your chest when coughing or sneezing. Avoid pushing off with your arms when getting up for the first month if you have had your sternum opened.    Keep wound clean and dry. No showers, baths, or swimming. Cover chest tube sites with gauze until they stop draining, then leave open to air. It is not abnormal for chest tube sites to drain yellowish/clear fluid for up to 2-3 weeks after surgery.   Watch for signs of infection: increased redness, tenderness, warmth or any drainage that appears infected (pus like) or is persistent.  Also a temperature > 100.5 F or chills. Call your surgeon or primary care provider's office immediately. Remove any skin glue left on incisions after 10-14 days. This will not affect your incision and can speed up healing.    Exercise is very important in your recovery. Please follow the guidelines set up for you in your cardiac rehab classes at the hospital. If outpatient cardiac rehab was ordered for you, we highly recommend you participate. If you have problems arranging your cardiac rehab, please call [phone].     Avoid sitting for prolonged periods of time, try to walk every hour during the day. If you have a leg incision, elevate your leg often when you are not walking.    Check your weight when you get home from the hospital and continue to check it daily through your recovery for at least a month. If you notice a weight gain of 2-3 pounds in a week, notify your primary care physician, cardiologist or surgeon.    Bowel activity may be slow after surgery. If necessary, you may take an over the counter laxative such as Milk of Magnesia or Miralax. You may have stool softeners prescribed (docusate sodium, Senokot). We recommend using stool softeners while using narcotics for pain (oxycodone/percocet, hydrocodone/vicodin, hydromorphone/dilaudid).      Wean OFF of narcotics (oxycodone, dilaudid, hydrocodone) as soon as possible.  You should continue taking acetaminophen as long as you have any surgical pain as the first choice for pain control and add narcotics as necessary for pain to be tolerable.      DO NOT SMOKE.  IF YOU NEED HELP QUITTING, PLEASE TALK WITH YOUR CARDIOLOGIST OR PRIMARY DOCTOR.    You are on a blood thinner (coumadin/warfarin), follow the instructions you were given in the hospital and DO NOT SKIP this medication. Try and take it the same time everyday. Your primary care physician or coumadin clinic will manage the dosing. INR goal is 2-3 for LVAD.     You have an LVAD device, so call your LVAD coordinator with all questions and concerns.  No swimming, showering, or baths. Daily sterile driveline dressing changes as instructed.     REGARDING PRESCRIPTION REFILLS.  If you need a refill on your pain medication contact us to discuss your pain and a possible one time refill.   All other medications will be adjusted, discontinued and re-filled by your primary care physician and/or your cardiologist as they were prior to your surgery. We have given you enough for one to three month with possibly one refill.    POST-OPERATIVE CLINIC VISITS  You will now return to the care of your primary physician and your cardiologist.   Your LVAD coordinator will help set up all future appts, lab draws, etc.   It is important to see your cardiologist about 1-2 weeks after discharge and your primary care physician in 2-4 weeks after discharge. If you do not hear from a  in 7 days, please call [phone] (choose option 1) and request to be seen with a general cardiologist or someone that you have seen in the past.   If there is a need to return to see CT Surgery please call our  at [phone].     DISCHARGE MEDICATIONS:    Mariusz Sharp   Home Medication Instructions EARL:[id number]    Printed on:01/11/19 0806   Medication Information                      acetaminophen (TYLENOL) 325 MG tablet  Take 2 tablets (650 mg) by  mouth every 6 hours as needed for pain             amiodarone (PACERONE/CODARONE) 200 MG tablet  Take 1 tablet (200 mg) by mouth daily             aspirin (ASA) 81 MG chewable tablet  Take 1 tablet (81 mg) by mouth daily             digoxin (LANOXIN) 125 MCG tablet  Take 1 tablet (125 mcg) by mouth daily             famotidine (PEPCID) 20 MG tablet  Take 20 mg by mouth 2 times daily             furosemide (LASIX) 20 MG tablet  Take 1 tablet (40 mg) by mouth daily             heparin  drip 25,000 units in 0.45% NaCl 250 mL (see additional administration details for dose)  Inject 0-3,500 Units/hr into the vein continuous             hydrALAZINE (APRESOLINE) 50 MG tablet  Take 1 tablet (25 mg) by mouth every 6 hours             insulin aspart (NOVOLOG PEN) 100 UNIT/ML pen  Inject 1-10 Units Subcutaneous 3 times daily (before meals) Correction Scale - HIGH INSULIN RESISTANCE DOSING     -164 =1 units.   -189 =2.   -214 =3.   -239 =4.   -264 =5.   -289 =6.   -314 =7.   -339 =8.   -364 =9.  BG greater than or equal to 365 give 10 units  To be given with meal insulin, based on pre-meal BG             insulin aspart (NOVOLOG PEN) 100 UNIT/ML pen  Inject 1-7 Units Subcutaneous At Bedtime HIGH INSULIN RESISTANCE DOSING    Bedtime  For  - 224 give 1 units.   For  - 249 give 2 units.   For  - 274 give 3 units.   For  - 299 give 4 units.   For  - 324 give 5 units.   For  - 349 give 6 units.   For BG greater than or equal to 350 give 7 units.             insulin aspart (NOVOLOG PEN) 100 UNIT/ML pen  Prandial Dosin units per 10 grams of carbohydrate each Meal or Snack.  Only chart total amount of units given.  Do not give if pre-prandial glucose is less than 60 mg/dL. If given at mealtime, administer within 30 minutes of start of meal.             insulin glargine (LANTUS SOLOSTAR PEN) 100 UNIT/ML pen  Inject 20 Units Subcutaneous At  Bedtime             melatonin 1 MG TABS tablet  Take 1 tablet (1 mg) by mouth nightly as needed for sleep             methocarbamol (ROBAXIN) 750 MG tablet  Take 1 tablet (750 mg) by mouth 3 times daily as needed for muscle spasms             nitroGLYcerin (NITROSTAT) 0.4 MG sublingual tablet  Place 0.4 mg under the tongue every 5 minutes as needed for chest pain For chest pain place 1 tablet under the tongue every 5 minutes for 3 doses. If symptoms persist 5 minutes after 1st dose call 911.             oxyCODONE (ROXICODONE) 5 MG tablet  Take 1 tablet (5 mg) by mouth every 4 hours as needed for severe pain             polyethylene glycol (MIRALAX/GLYCOLAX) packet  Take 17 g by mouth daily as needed for constipation             rosuvastatin (CRESTOR) 20 MG tablet  Take 1 tablet (20 mg) by mouth At Bedtime             senna-docusate (SENOKOT-S/PERICOLACE) 8.6-50 MG tablet  Take 1 tablet by mouth daily PRN hold for loose stools             warfarin (COUMADIN) 2 MG tablet  Take 12 mg PO daily until next INR check, then dose per INR goal 2-3             Warfarin Therapy Reminder  1 each continuous prn (INR goal 2-3 for LVAD)                   CC:eduardo Irving, Jenni Chun       [facility]   Cardiothoracic Surgery  Office phone: [phone]  Office fax: [phone]

## 2019-01-09 NOTE — PROGRESS NOTES
Transplant Social Work Services Progress Note      Collaborated with: CVTS, [facility] admissions, Red, Accommodations, LVAD Coordinator     Data: CM informed that Red is likely ready to discharge to TCU tomorrow, for some therapy and continued lvad education.   Intervention: Met with Red. Made referral to [facility]. Discussed [place] apartment & caregiver lodging.   Assessment: Red is agreeable to go to TCU when medically stable. He reports that his son is here currently in a hotel. Red would like the apartment starting on Friday. He is expecting a phone call from [employer] to set up time later this week to do apartment paperwork.   Education provided by SW: discharge planning  Plan:    Discharge Plans in Progress: to TCU once medically stable. Due to lvad, [facility] is only option.     Barriers to d/c plan: medical stability.     Follow up Plan: SW will continue to follow for support and discharge planning as needed.    Pager [phone]    Addendum:  CM informed that Red is medically ready tomorrow and [facility] has a bed available. CM set up ride for 1:45 on Thursday via stretcher (not finished with lvad teaching) and separate rig for lvad equipment.

## 2019-01-09 NOTE — PLAN OF CARE
Shift summary [id number]    D:58 year old male with a history of ICM, CAD (s/p STEMI with ALYSSA to LAD 6/27/18), monomorphic VT, LV thrombus, CKD Stage III, paroxysmal AF, DM Type II who was admitted for placement of LVAD. Now s/p HM3 LVAD 12/31/2018  I:monitored rhythm and HM numbers, CT and HM dressings done on days. Blood sugar monitored. Encouraged IS use. CXR obtained earlier today  A:Pt found to have small Pneumothorax. CT out put 212 ml serosanguinous fluid. Lungs clear but diminished. Pt not requiring any 02 . Pt denies any SOB or pain. Dressing CDI, VSS, HM numbers WNL.  P:Pt family coming tomorrow and will be here through Monday for teaching. Continue to encourage IS and monitoring of CT output and HM numbers. Anticipate possible discontinue by Monday.

## 2019-01-09 NOTE — PROGRESS NOTES
CVTS Daily Note  1/9/2019  Attending: Kamaljit Baker MD    S:   No overnight events. No flow issues this AM.   Working on getting family in for driveline dressing changes  Pt seen at bedside resting comfortably.    No acute complaints.      Denies F/C/Sweats.  No CP, SOB, or calf pain.    Tolerating diet well.  + BM.  + Flatus.    Ambulated well with assistance.    Pain level tolerable. Plan as per Neuro section below.     O:   Vitals:    01/08/19 1912 01/08/19 2347 01/09/19 0353 01/09/19 0545   BP: 102/86 (!) 107/93 (!) 106/93 100/89   BP Location: Left arm Left arm Left arm    Pulse:       Resp: 16 16 16    Temp: 98.2  F (36.8  C) 98.2  F (36.8  C) 98  F (36.7  C)    TempSrc: Oral Oral Oral    SpO2: 95% 96% 97%    Weight:       Height:         Vitals:    01/05/19 0100 01/06/19 0400 01/06/19 2227   Weight: 82.1 kg (180 lb 16 oz) 76.1 kg (167 lb 12.3 oz) 80.4 kg (177 lb 4 oz)     + 3 kg since admit and trending stable      Intake/Output Summary (Last 24 hours) at 1/9/2019 0735  Last data filed at 1/9/2019 0659  Gross per 24 hour   Intake 859.75 ml   Output 1142 ml   Net -282.25 ml       MAPs: 90 - 100   Gen: AAO x 3, pleasant, NAD  CV: LVAD humming, S1S2 normal, no murmurs, rubs, or gallops.   Pulm: CTA, no rhonchi or wheezes  Abd: soft, non-tender, no guarding  Ext: trace peripheral edema, 1 + pitting  Incision: clean, dry, intact, no erythema  Chest Tube sites: dressings clean and dry, serosanguinous, no air leak, output 350 cc   Lines: driveline dressing clean and dry   LVAD: speed 5350, flow 3.2 - 3.4, PI 2.5 - 6.2, power 3.8     * PI was 2.5 during exam       Labs:  BMP  Recent Labs   Lab 01/09/19  0633 01/08/19  0434 01/07/19  0308 01/06/19  0252 01/05/19  1930    138 139  --  136   POTASSIUM 4.2 4.2 4.1 3.9 4.0   CHLORIDE 105 108 108  --  105   ARLENE 8.3* 8.2* 7.9*  --  7.9*   CO2 24 22 23  --  24   BUN 27 21 19  --  22   CR 1.19 1.19 1.23  --  1.29*   * 141* 148*  --  237*     CBC  Recent Labs    Lab 01/09/19  0633 01/08/19  0434 01/07/19  0308 01/06/19  0252   WBC 9.1 7.9 7.2 5.4   RBC 3.08* 2.94* 2.95* 3.01*   HGB 8.6* 8.2* 8.2* 8.4*   HCT 27.9* 26.5* 26.4* 26.8*   MCV 91 90 90 89   MCH 27.9 27.9 27.8 27.9   MCHC 30.8* 30.9* 31.1* 31.3*   RDW 16.7* 16.5* 16.1* 15.9*    270 216 168     INR  Recent Labs   Lab 01/09/19  0633 01/08/19  0434 01/07/19  0308 01/06/19  0252   INR 1.26* 1.21* 1.20* 1.25*      Hepatic Panel   Lab Results   Component Value Date    AST 12 01/08/2019     Lab Results   Component Value Date    ALT 73 01/08/2019     Lab Results   Component Value Date    ALBUMIN 2.6 01/08/2019     GLUCOSE:   Recent Labs   Lab 01/09/19  0633 01/08/19  2133 01/08/19  1758 01/08/19  1229 01/08/19  0950 01/08/19  0434 01/07/19  2116 01/07/19  1741  01/07/19  0308  01/05/19  1930  01/05/19  1615  01/05/19  0525   *  --   --   --   --  141*  --   --   --  148*  --  237*  --  282*  --  162*   BGM  --  176* 191* 143* 130*  --  175* 154*   < >  --    < >  --    < >  --    < >  --     < > = values in this interval not displayed.         Imaging:  reviewed recent imaging      A/P:   Mariusz Sharp is a 58 year old male now s/p LVAD (HM 3) placement with Dr. Baker on 12/31/2018 for a history of ischemic cardiomyopathy with reduced ejection fraction. There were no major intraoperative events to report and the patient was able to be liberated from cardiopulmonary bypass without significant difficulty. Concern regarding low flow states prompted keeping the patient intubated on 1/1 and on 1/2 following a SELVIN, the patient was able to be extubated. Elevated MAPs required initiating afterload reduction on 1/3 and increased frequency 1/8.       Neuro:   - Neuro intact  - Tylenol, oxycodone, robaxin PRN for pain.     CV:   - ICM NYHA class IV stage D. LVEF <20%, mild RV dysfunction (improved on dobutamine gtt). S/P HeartMate III.   - No arrhythmia, HD stable.  Low flows likely related to increased afterload.  -  ASA 81 mg, Crestor, digoxin 125 mcg, Amio 200 mg daily, hydralazine 12.5 mg QID.    - Dobutamine gtt per Cards II, off 1/8      Pulm:   - Pulm toilet, IS, activity and deep breathing   - Supplemental O2 PRN to keep sats > 92%, wean as tolerated.     ID:   - WBC WNL, afebrile, no signs or symptoms of infection     GI / FEN:  - Carb consistent diet, bowel regimen, PRN suppository      Renal / :   - Creatinine WNL, adequate UOP   - Diuresis PRN. Lasix 40 mg PO 1/9.    - Fluid status remains slightly hypervolemic       Heme:   - Hgb 8.6, Plts WNL     Endo:   - Lantus 20 U q PM, prandial dosing 1 U per 10 grams Carb, high corrective sliding scale     PPX:   - Protonix     Anticoagulation:   - Low intensity Hep gtt. Coumadin for INR, goal 2-3.  - INR 1.26      Dispo:   - 6C since 1/7/18  - PICC right arm, remove at discharge  - Medically stable. Discussing Rehab discharge for teaching and await INR > 2      Discussed with CVTS Fellow   Staff surgeons have been informed of changes through both  verbal and written communication.      Guillermo Ortiz PA-C  Cardiothoracic Surgery  Pager [phone]    7:35 AM   January 9, 2019    Patient seen and examined.Investigations reviewed. Increase pump speed. Continue current anticoagulation plan. I agree with the findings outlined in the ANNE-MARIE note. I spent a total of 25 minutes examining the patient, reviewing investigations and therapeutic counseling.

## 2019-01-10 LAB
ALBUMIN SERPL-MCNC: 2.7 G/DL (ref 3.4–5)
ALP SERPL-CCNC: 55 U/L (ref 40–150)
ALT SERPL W P-5'-P-CCNC: 52 U/L (ref 0–70)
ANION GAP SERPL CALCULATED.3IONS-SCNC: 10 MMOL/L (ref 3–14)
ANION GAP SERPL CALCULATED.3IONS-SCNC: 9 MMOL/L (ref 3–14)
AST SERPL W P-5'-P-CCNC: 19 U/L (ref 0–45)
BILIRUB DIRECT SERPL-MCNC: 0.1 MG/DL (ref 0–0.2)
BILIRUB SERPL-MCNC: 0.3 MG/DL (ref 0.2–1.3)
BUN SERPL-MCNC: 34 MG/DL (ref 7–30)
BUN SERPL-MCNC: 34 MG/DL (ref 7–30)
CALCIUM SERPL-MCNC: 8.2 MG/DL (ref 8.5–10.1)
CALCIUM SERPL-MCNC: 8.3 MG/DL (ref 8.5–10.1)
CHLORIDE SERPL-SCNC: 106 MMOL/L (ref 94–109)
CHLORIDE SERPL-SCNC: 108 MMOL/L (ref 94–109)
CO2 SERPL-SCNC: 21 MMOL/L (ref 20–32)
CO2 SERPL-SCNC: 23 MMOL/L (ref 20–32)
CREAT SERPL-MCNC: 1.55 MG/DL (ref 0.66–1.25)
CREAT SERPL-MCNC: 1.64 MG/DL (ref 0.66–1.25)
ERYTHROCYTE [DISTWIDTH] IN BLOOD BY AUTOMATED COUNT: 16.6 % (ref 10–15)
GFR SERPL CREATININE-BSD FRML MDRD: 46 ML/MIN/{1.73_M2}
GFR SERPL CREATININE-BSD FRML MDRD: 49 ML/MIN/{1.73_M2}
GLUCOSE BLDC GLUCOMTR-MCNC: 136 MG/DL (ref 70–99)
GLUCOSE BLDC GLUCOMTR-MCNC: 145 MG/DL (ref 70–99)
GLUCOSE BLDC GLUCOMTR-MCNC: 172 MG/DL (ref 70–99)
GLUCOSE BLDC GLUCOMTR-MCNC: 206 MG/DL (ref 70–99)
GLUCOSE BLDC GLUCOMTR-MCNC: 209 MG/DL (ref 70–99)
GLUCOSE BLDC GLUCOMTR-MCNC: 228 MG/DL (ref 70–99)
GLUCOSE SERPL-MCNC: 204 MG/DL (ref 70–99)
GLUCOSE SERPL-MCNC: 217 MG/DL (ref 70–99)
HCT VFR BLD AUTO: 29.2 % (ref 40–53)
HGB BLD-MCNC: 8.8 G/DL (ref 13.3–17.7)
INR PPP: 1.34 (ref 0.86–1.14)
LMWH PPP CHRO-ACNC: 0.32 IU/ML
MCH RBC QN AUTO: 27.2 PG (ref 26.5–33)
MCHC RBC AUTO-ENTMCNC: 30.1 G/DL (ref 31.5–36.5)
MCV RBC AUTO: 90 FL (ref 78–100)
NT-PROBNP SERPL-MCNC: 1177 PG/ML (ref 0–900)
PLATELET # BLD AUTO: 420 10E9/L (ref 150–450)
POTASSIUM SERPL-SCNC: 4.4 MMOL/L (ref 3.4–5.3)
POTASSIUM SERPL-SCNC: 4.5 MMOL/L (ref 3.4–5.3)
PROT SERPL-MCNC: 6.2 G/DL (ref 6.8–8.8)
RBC # BLD AUTO: 3.23 10E12/L (ref 4.4–5.9)
SODIUM SERPL-SCNC: 138 MMOL/L (ref 133–144)
SODIUM SERPL-SCNC: 139 MMOL/L (ref 133–144)
WBC # BLD AUTO: 9.3 10E9/L (ref 4–11)

## 2019-01-10 PROCEDURE — 25000132 ZZH RX MED GY IP 250 OP 250 PS 637: Performed by: PHYSICIAN ASSISTANT

## 2019-01-10 PROCEDURE — 25000128 H RX IP 250 OP 636: Performed by: STUDENT IN AN ORGANIZED HEALTH CARE EDUCATION/TRAINING PROGRAM

## 2019-01-10 PROCEDURE — 40000802 ZZH SITE CHECK

## 2019-01-10 PROCEDURE — 25000132 ZZH RX MED GY IP 250 OP 250 PS 637: Performed by: SURGERY

## 2019-01-10 PROCEDURE — 25000132 ZZH RX MED GY IP 250 OP 250 PS 637: Performed by: THORACIC SURGERY (CARDIOTHORACIC VASCULAR SURGERY)

## 2019-01-10 PROCEDURE — 99232 SBSQ HOSP IP/OBS MODERATE 35: CPT | Mod: GC | Performed by: INTERNAL MEDICINE

## 2019-01-10 PROCEDURE — 85610 PROTHROMBIN TIME: CPT | Performed by: PHYSICIAN ASSISTANT

## 2019-01-10 PROCEDURE — 85027 COMPLETE CBC AUTOMATED: CPT | Performed by: PHYSICIAN ASSISTANT

## 2019-01-10 PROCEDURE — 80076 HEPATIC FUNCTION PANEL: CPT | Performed by: PHYSICIAN ASSISTANT

## 2019-01-10 PROCEDURE — 83880 ASSAY OF NATRIURETIC PEPTIDE: CPT | Performed by: PHYSICIAN ASSISTANT

## 2019-01-10 PROCEDURE — 36415 COLL VENOUS BLD VENIPUNCTURE: CPT | Performed by: PHYSICIAN ASSISTANT

## 2019-01-10 PROCEDURE — 21400000 ZZH R&B CCU UMMC

## 2019-01-10 PROCEDURE — 85520 HEPARIN ASSAY: CPT | Performed by: PHYSICIAN ASSISTANT

## 2019-01-10 PROCEDURE — 80048 BASIC METABOLIC PNL TOTAL CA: CPT | Performed by: PHYSICIAN ASSISTANT

## 2019-01-10 PROCEDURE — 00000146 ZZHCL STATISTIC GLUCOSE BY METER IP

## 2019-01-10 PROCEDURE — 25000132 ZZH RX MED GY IP 250 OP 250 PS 637

## 2019-01-10 PROCEDURE — 25000132 ZZH RX MED GY IP 250 OP 250 PS 637: Performed by: INTERNAL MEDICINE

## 2019-01-10 RX ORDER — HYDRALAZINE HYDROCHLORIDE 25 MG/1
25 TABLET, FILM COATED ORAL EVERY 6 HOURS
Qty: 120 TABLET | Refills: 0 | DISCHARGE
Start: 2019-01-10 | End: 2019-01-11

## 2019-01-10 RX ORDER — OXYCODONE HYDROCHLORIDE 5 MG/1
5 TABLET ORAL EVERY 4 HOURS PRN
Qty: 20 TABLET | Refills: 0 | Status: ON HOLD | OUTPATIENT
Start: 2019-01-10 | End: 2019-01-15

## 2019-01-10 RX ORDER — WARFARIN SODIUM 2 MG/1
TABLET ORAL
Qty: 80 TABLET | Refills: 0 | DISCHARGE
Start: 2019-01-10 | End: 2019-01-11

## 2019-01-10 RX ORDER — HYDRALAZINE HYDROCHLORIDE 25 MG/1
25 TABLET, FILM COATED ORAL EVERY 6 HOURS SCHEDULED
Status: DISCONTINUED | OUTPATIENT
Start: 2019-01-10 | End: 2019-01-11

## 2019-01-10 RX ORDER — HEPARIN SODIUM 10000 [USP'U]/100ML
0-3500 INJECTION, SOLUTION INTRAVENOUS CONTINUOUS
Status: ON HOLD | DISCHARGE
Start: 2019-01-10 | End: 2019-01-15

## 2019-01-10 RX ORDER — FUROSEMIDE 40 MG
40 TABLET ORAL DAILY
Qty: 60 TABLET | Refills: 0 | DISCHARGE
Start: 2019-01-10 | End: 2019-01-11

## 2019-01-10 RX ORDER — WARFARIN SODIUM 10 MG/1
10 TABLET ORAL
Status: COMPLETED | OUTPATIENT
Start: 2019-01-10 | End: 2019-01-10

## 2019-01-10 RX ADMIN — Medication 12.5 MG: at 05:49

## 2019-01-10 RX ADMIN — PANTOPRAZOLE SODIUM 40 MG: 40 TABLET, DELAYED RELEASE ORAL at 08:27

## 2019-01-10 RX ADMIN — INSULIN ASPART 3 UNITS: 100 INJECTION, SOLUTION INTRAVENOUS; SUBCUTANEOUS at 10:46

## 2019-01-10 RX ADMIN — HYDRALAZINE HYDROCHLORIDE 25 MG: 25 TABLET, FILM COATED ORAL at 17:38

## 2019-01-10 RX ADMIN — SENNOSIDES AND DOCUSATE SODIUM 1 TABLET: 8.6; 5 TABLET ORAL at 08:27

## 2019-01-10 RX ADMIN — AMIODARONE HYDROCHLORIDE 200 MG: 200 TABLET ORAL at 08:27

## 2019-01-10 RX ADMIN — INSULIN ASPART 2 UNITS: 100 INJECTION, SOLUTION INTRAVENOUS; SUBCUTANEOUS at 11:49

## 2019-01-10 RX ADMIN — WARFARIN SODIUM 10 MG: 10 TABLET ORAL at 17:37

## 2019-01-10 RX ADMIN — DIGOXIN 125 MCG: 125 TABLET ORAL at 08:30

## 2019-01-10 RX ADMIN — HYDRALAZINE HYDROCHLORIDE 25 MG: 25 TABLET, FILM COATED ORAL at 11:33

## 2019-01-10 RX ADMIN — HEPARIN SODIUM 1500 UNITS/HR: 10000 INJECTION, SOLUTION INTRAVENOUS at 11:36

## 2019-01-10 RX ADMIN — ASPIRIN 81 MG CHEWABLE TABLET 81 MG: 81 TABLET CHEWABLE at 08:26

## 2019-01-10 RX ADMIN — ROSUVASTATIN CALCIUM 20 MG: 20 TABLET, FILM COATED ORAL at 08:27

## 2019-01-10 RX ADMIN — SENNOSIDES AND DOCUSATE SODIUM 2 TABLET: 8.6; 5 TABLET ORAL at 19:40

## 2019-01-10 ASSESSMENT — ACTIVITIES OF DAILY LIVING (ADL)
ADLS_ACUITY_SCORE: 10
ADLS_ACUITY_SCORE: 14

## 2019-01-10 ASSESSMENT — MIFFLIN-ST. JEOR: SCORE: 1528.9

## 2019-01-10 NOTE — PLAN OF CARE
D: admitted with cardiogenic shock. HM 3 since 12/31/18. Hx V-tach, systolic HF, ICM, CAD, DM2, CKD.     I/A: A&Ox4. VSSA, on room air. Adequate UO. Denies pain.  CT x1 to bulb suction. Right triple lumen PICC, heparin gtt @ 1500 unit(s)/hr. Right PIV, saline locked.     P: Continue to monitor patient. Keep MAPs below 90. Discharge to  TCU today at 1345 per SW note. Notify CVTS with pertinent changes.

## 2019-01-10 NOTE — PROGRESS NOTES
Patient Name:  Mariusz Sharp     Anticipated Discharge Date:  1/10/19    Discharge Disposition:   TCU:  [facility]    Transportation Needs: stretcher- not trained on lvad. Second rig for lvad equipment. Ride arranged for 1:45 today  Name of Transportation Company and Phone: [employer] [phone]     Pre-Admission Screening (PAS) online form has been completed.  The Level of Care (LOC) is:  Determined  Confirmation Code is:  [id number]  Patient/caregiver informed of referral to [facility] for Pre-Admission Screening for skilled nursing facility (SNF) placement and to expect a phone call post discharge from SNF.     Additional Services/Equipment Arranged:  none     Persons notified of above discharge plan:  Red, medical teams, Bedside RN.     Patient / Family response to discharge plan:  Red is agreeable.      Education provided by CM at discharge: role of transplant  in out patient setting and provided contact info for     Patient and family discharge goal: Red plans to sign contract for [facility] tomorrow so family can stay there.   Provided Education on discharge plan: YES  Patient agreeable to discharge plan:  YES  A list of Medicare Certified Facilities was provided to the patient and/or family to encourage patient choice. Patient's choices for facility are: [facility] only option due to new lvad.   Will NH provide Skilled rehabilitation or complex medical:  YES  General information regarding anticipated insurance coverage and possible out of pocket cost was discussed. Patient and patient's family are aware patient may incur the cost of transportation to the facility, pending insurance payment: YES  Barriers to discharge: None    CTS Handoff completed:  YES -verbal report given     Medicare Notice of Rights provided to the patient/family:  NO -not a Medicare patient    Addendum:  Red's creatine has increased today and needs to remain hospitalized. CM bumped  ride to 1:45 on Friday. This is tentative until his labs come back tomorrow.

## 2019-01-10 NOTE — PLAN OF CARE
D: Pt presents this admission with heart failure now s/p LVAD HM III on 12/31/18. Hx of ICM, Vtach, systolic HF, CAD, DM2 and CKD.     I/A: Pt alert and oriented x4. VSS. Pt paced rhythm with HRs 70-80s. Pt on RA with O2 sats >92%. Please refer to flowsheets for VAD #s. Dressing change done by coordinator and family today. Coordinator did VAD education at bedside with pt son and brother. Pt has not been eating much today, per pt, just not hungry but will order food later. BS this shift 209 and 172. Sliding scale insulin given 2x. Last BM yesterday. Last CT pulled this morning; dressing CDI. Pt ambulating room with SBA, denies dizziness, lightheadedness or nausea. INR this morning 1.34, pharmacy to order evening dose of warfarin. Hep gtt continued therapeutically at 1500 units/hr.     P: Discharge to [facility] tomorrow if renal function improved. Continue to monitor and assess pt with every encounter. Notify Cards 2 with any changes or questions.

## 2019-01-10 NOTE — PROGRESS NOTES
[facility] Progress Note           Assessment and Plan:   58 year old male with a history of ICM, CAD (s/p STEMI with ALYSSA to LAD 6/27/18), monomorphic VT, LV thrombus, CKD Stage III, paroxysmal AF, DM Type II who was admitted for placement of LVAD. Now s/p HM3 LVAD 12/31.     HeartMate III LVAD placed 12/31:  - speed 5300 RPM, will hold off further changes for now until ramp study  - on ASA 81  - on heparin drip, warfarin, goal INR 2-3  - dobutamine stopped 1/7, holding beta blocker for now, may resume in clinic  - increased hydralazine to 25 q6h for hypertension (was not on ACE due to CKD, though may be beneficial to be on low dose for additional neurohormonal blockade, can be addressed as outpatient as well)  - would continue lasix, home med 40mg BID, has trace lower extremity edema and above presumed dry weight  - on digoxin 125mg daily, last level ok     Ischemic cardiomyopathy, CAD:  - ALYSSA to LAD 6/27/2018  - was not on ACE-I due to CKD  - holding BB after LVAD  - continue rosuvastatin  - was on plavix, holding now after surgery     Paroxysmal afib:  - currently in sinus rhythm  - holding BB after LVAD, on dig  - heparin drip, warfarin     CKD - stable with good urine output.  Appears that baseline Cr is about 1.6 prior to surgery.  Certainly may have a component of cardiorenal due to low output heart failure that can improve in time.     Miriam Pascal MD PGY5  Cardiology Fellow    Patient was seen by and the above plan discussed with Dr. [place].       Subjective:     Patient denies current chest pain, dyspnea on exertion, orthopnea, PND, lightheadedness, or palpitations.           Medications:       amiodarone  200 mg Oral Daily     aspirin  81 mg Oral Daily     digoxin  125 mcg Oral Daily     hydrALAZINE  25 mg Oral Q6H CHANCE     influenza vaccine adult (product based on age)  0.5 mL Intramuscular Prior to discharge     insulin aspart   Subcutaneous TID w/meals     insulin aspart  1-10  Units Subcutaneous TID AC     insulin aspart  1-7 Units Subcutaneous At Bedtime     insulin glargine  20 Units Subcutaneous At Bedtime     lidocaine  2 patch Transdermal Q24H     lidocaine   Transdermal Q8H     lidocaine   Transdermal Q24h     pantoprazole  40 mg Oral QAM AC     polyethylene glycol  17 g Oral BID     rosuvastatin  20 mg Oral Daily     senna-docusate  1 tablet Oral BID    Or     senna-docusate  2 tablet Oral BID     sodium chloride (PF)  10 mL Intracatheter Q8H     sodium chloride (PF)  10 mL Intravenous Once     sodium chloride (PF)  3 mL Intracatheter Q8H     sodium chloride (PF)  3 mL Intracatheter Q8H       HEParin 1,500 Units/hr (01/10/19 0000)     - MEDICATION INSTRUCTIONS -       BETA BLOCKER NOT PRESCRIBED       Warfarin Therapy Reminder            Objective:          Physical Exam:   Temp:  [97.8  F (36.6  C)-98.3  F (36.8  C)] 98.2  F (36.8  C)  Heart Rate:  [75-82] 75  Resp:  [16-18] 16  BP: ()/(57-87) 94/79  SpO2:  [94 %-98 %] 97 %  I/O last 3 completed shifts:  In: 1566.75 [P.O.:956; I.V.:610.75]  Out: 1910 [Urine:1825; Chest Tube:85]    Vitals:    01/04/19 0500 01/05/19 0100 01/06/19 0400 01/06/19 2227   Weight: 76.5 kg (168 lb 11.2 oz) 82.1 kg (180 lb 16 oz) 76.1 kg (167 lb 12.3 oz) 80.4 kg (177 lb 4 oz)    01/09/19 0740   Weight: 79.3 kg (174 lb 12.8 oz)     HMIII  Speed 5300  Flow 3.9-4.3  Power 3.8  PI 3-3.6    Rare PI events    GEN:  Alert, interactive, appears comfortable, NAD.  HEENT:  JVP about 9 cm  CV:  Regular rate and rhythm, LVAD hum,   LUNGS:  On room air, normal work of breathing, Clear to auscultation bilaterally, no wheezes or crackles.   ABD:  Active bowel sounds, soft, non-tender/non-distended.  No rebound/guarding/rigidity.  EXT:  Trace lower extremity edema.  Hands/feet warm to touch with good signs of peripheral perfusion.   SKIN:  Dry to touch, no exanthems noted in the visualized areas.  NEURO:  Alert and oriented, no new focal deficits appreciated.  PSCYH:  Normal mood and affect         Data:   BMP  Recent Labs   Lab 01/10/19  0552 01/09/19  0633 01/08/19  0434 01/07/19  0308    137 138 139   POTASSIUM 4.4 4.2 4.2 4.1   CHLORIDE 108 105 108 108   ARLENE 8.3* 8.3* 8.2* 7.9*   CO2 21 24 22 23   BUN 34* 27 21 19   CR 1.55* 1.19 1.19 1.23   * 154* 141* 148*     LFTs  Recent Labs   Lab 01/10/19  0552 01/08/19  0434 01/07/19  0308 01/05/19  1930   ALKPHOS 55 47 49 57   AST 19 12 18 32   ALT 52 73* 103* 178*   BILITOTAL 0.3 0.4 0.4 0.5   PROTTOTAL 6.2* 6.0* 6.0* 6.2*   ALBUMIN 2.7* 2.6* 2.6* 2.8*      CBC  Recent Labs   Lab 01/10/19  0552 01/09/19  0633 01/08/19  0434 01/07/19  0308   WBC 9.3 9.1 7.9 7.2   RBC 3.23* 3.08* 2.94* 2.95*   HGB 8.8* 8.6* 8.2* 8.2*   HCT 29.2* 27.9* 26.5* 26.4*   MCV 90 91 90 90   MCH 27.2 27.9 27.9 27.8   MCHC 30.1* 30.8* 30.9* 31.1*   RDW 16.6* 16.7* 16.5* 16.1*    357 270 216     INR  Recent Labs   Lab 01/10/19  0552 01/09/19  0633 01/08/19  0434 01/07/19  0308   INR 1.34* 1.26* 1.21* 1.20*     Echo:  Interpretation Summary  LVAD cannula was seen in the expected anatomic position in the LV apex.  HM3 at 5300RPM.  LVIDd 49mm.  Septum normal position. Aortic valve open with each systole.  Normal outflow velocity.  Global right ventricular function is mildly reduced.  The inferior vena cava is normal.  No pericardial effusion is present.

## 2019-01-10 NOTE — PROGRESS NOTES
CVTS Daily Note  1/10/2019  Attending: Kamaljit Baker MD    S:   No overnight events.  Planning discharge today.   Pt seen at bedside resting comfortably.  No acute complaints.      Denies F/C/Sweats.  No CP, SOB, or calf pain.    Tolerating diet.  + BM.  + Flatus.    Ambulated well with assistance.    Pain level tolerable. Plan as per Neuro section below.     O:   Vitals:    01/09/19 2331 01/10/19 0348 01/10/19 0549 01/10/19 0659   BP: 98/70  94/79 101/83   BP Location: Left arm   Left arm   Pulse:       Resp: 16 16  16   Temp: 98.2  F (36.8  C)   98.2  F (36.8  C)   TempSrc: Oral   Oral   SpO2: 97%   95%   Weight:       Height:         Vitals:    01/06/19 0400 01/06/19 2227 01/09/19 0740   Weight: 76.1 kg (167 lb 12.3 oz) 80.4 kg (177 lb 4 oz) 79.3 kg (174 lb 12.8 oz)     Intake/Output Summary (Last 24 hours) at 1/10/2019 0918  Last data filed at 1/10/2019 0659  Gross per 24 hour   Intake 1076 ml   Output 1565 ml   Net -489 ml       MAPs: 82 - 90  Gen: AAO x 3, pleasant, NAD  CV: LVAD humming, RRR, S1S2 normal, no murmurs, rubs, or gallops.   Pulm: CTA, no rhonchi or wheezes  Abd: soft, non-tender, no guarding  Ext: no peripheral edema  Incision: clean, dry, intact, no erythema  Chest Tube sites: dressings clean and dry    * removed LVAD pocket drain without complication.    Lines: driveline dressing clean and dry   LVAD: speed 5300, flow 3.7 - 4.3, PI 3 - 5.1, power 3.8 - 3.9       Labs:  [facility]  Recent Labs   Lab 01/10/19  0552 01/09/19  0633 01/08/19  0434 01/07/19  0308    137 138 139   POTASSIUM 4.4 4.2 4.2 4.1   CHLORIDE 108 105 108 108   ARLENE 8.3* 8.3* 8.2* 7.9*   CO2 21 24 22 23   BUN 34* 27 21 19   CR 1.55* 1.19 1.19 1.23   * 154* 141* 148*     CBC  Recent Labs   Lab 01/10/19  0552 01/09/19  0633 01/08/19  0434 01/07/19  0308   WBC 9.3 9.1 7.9 7.2   RBC 3.23* 3.08* 2.94* 2.95*   HGB 8.8* 8.6* 8.2* 8.2*   HCT 29.2* 27.9* 26.5* 26.4*   MCV 90 91 90 90   MCH 27.2 27.9 27.9 27.8   MCHC 30.1* 30.8*  30.9* 31.1*   RDW 16.6* 16.7* 16.5* 16.1*    357 270 216     INR  Recent Labs   Lab 01/10/19  0552 01/09/19  0633 01/08/19  0434 01/07/19  0308   INR 1.34* 1.26* 1.21* 1.20*      Hepatic Panel   Lab Results   Component Value Date    AST 19 01/10/2019     Lab Results   Component Value Date    ALT 52 01/10/2019     Lab Results   Component Value Date    ALBUMIN 2.7 01/10/2019     GLUCOSE:   Recent Labs   Lab 01/10/19  0902 01/10/19  0552 01/10/19  0458 01/09/19  2217 01/09/19  2133 01/09/19  1754 01/09/19  1206  01/09/19  0633  01/08/19  0434  01/07/19  0308  01/05/19  1930  01/05/19  1615   GLC  --  217*  --   --   --   --   --   --  154*  --  141*  --  148*  --  237*  --  282*   *  --  145* 211* 257* 128* 204*   < >  --    < >  --    < >  --    < >  --    < >  --     < > = values in this interval not displayed.       Imaging:    CXR 1/9-   1. No pneumothorax. Interval removal of left basilar chest tube.   2. Unchanged bibasilar streaky opacities may represent atelectasis versus consolidation.   3. No interval change in small left greater than right pleural effusions.       A/P:   Mariusz Sharp is a 58 year old male now s/p LVAD (HM 3) placement with Dr. Baker on 12/31/2018 for a history of ischemic cardiomyopathy with reduced ejection fraction. There were no major intraoperative events to report and the patient was able to be liberated from cardiopulmonary bypass without significant difficulty. Concern regarding low flow states prompted keeping the patient intubated on 1/1 and on 1/2 following a SELVIN, the patient was able to be extubated. Elevated MAPs required initiating afterload reduction on 1/3 and increased frequency 1/8.       Neuro:   - Neuro intact  - Tylenol, oxycodone, robaxin PRN for pain.     CV:   - ICM NYHA class IV stage D. LVEF <20%, mild RV dysfunction (improved on dobutamine gtt). S/P HeartMate III.   - No arrhythmia, HD stable.  Low flows likely related to increased afterload.  - ASA  81 mg, Crestor, digoxin 125 mcg, Amio 200 mg daily, hydralazine 12.5 mg QID.    - Dobutamine gtt per Cards II, off 1/8      Pulm:   - Pulm toilet, IS, activity and deep breathing   - Supplemental O2 PRN to keep sats > 92%, wean as tolerated.     ID:   - WBC WNL, afebrile, no signs or symptoms of infection     GI / FEN:  - Carb consistent diet, bowel regimen, PRN suppository      Renal / :   - Creatinine up to 1.5 overnight, adequate UOP, likely secondary to diuresis.   - Diuresis PRN. Lasix 40 mg PO 1/9.    - Fluid status remains slightly hypervolemic       Heme:   - Hgb 8.8, Plts WNL     Endo:   - Lantus 25 U q PM, prandial dosing 1 U per 10 grams Carb, high corrective sliding scale     PPX:   - Protonix     Anticoagulation:   - Low intensity Hep gtt. Coumadin for INR, goal 2-3.  - INR 1.34      Dispo:   - 6C since 1/7/18  - PICC right arm, keep at discharge for Hep Gtt   - Medically stable. Discussing Rehab discharge for teaching and await INR > 2       Discussed with CVTS Fellow   Staff surgeons have been informed of changes through both  verbal and written communication.      Guillermo Ortiz PA-C  Cardiothoracic Surgery  Pager [phone]    9:18 AM   January 10, 2019

## 2019-01-10 NOTE — PLAN OF CARE
D:  S/p HM3 placement 12/31/2018    I: Monitored vitals and assessed pt status.   Changed: Pulled CT, remaining CT is to bulb suction   Running: Heparin @ 1500 unit(s)/hr  PRN:    A: A0x4. VSS. Afebrile. Urinating adequately. VAD #s WDL. Denied pain. CT to tee drain to bulb suction: serosanguinous output. Denied pain. LVAD coordinator performed teaching at bedside. Patient states comfortable with HM3 and management.  Patient eager to discharge to acute rehab.     P: Continue to monitor Pt status and report changes to treatment team. Possible discharge to acute rehab. Family will have LVAD teaching at rehab.

## 2019-01-10 NOTE — PLAN OF CARE
Discharge Planner OT   Patient plan for discharge: Not discussed this session.   Current status: Therapist educated pt on and reviewed sternal/driveline precautions and safety with functional transfers/ADLs. Pt required CGA and vc's for transfer supine<->seated EOB and sit<->standing. Pt required Min A and vc's to don holster and switch to battery power. Therapist provided pt with CR LVAD folder and educated pt on contents. Pt ambulated ~200ft x2 with CGA and vc's. Pt completed 13.5 minutes of Nustep exercise with fair tolerance. VSS.   Barriers to return to prior living situation: Decreased strength/activity tolerance, Fatigue, Decreased independence with functional transfers/ADLs.  Recommendations for discharge: TCU  Rationale for recommendations: Pt will benefit from continued therapy to maximize functional independence and safety.        Entered by: Papito Mosley 01/09/2019 6:05 PM

## 2019-01-11 ENCOUNTER — HOSPITAL ENCOUNTER (INPATIENT)
Facility: SKILLED NURSING FACILITY | Age: 59
LOS: 5 days | Discharge: HOME | End: 2019-01-16
Attending: HOSPITALIST | Admitting: HOSPITALIST
Payer: COMMERCIAL

## 2019-01-11 ENCOUNTER — CARE COORDINATION (OUTPATIENT)
Dept: CARDIOLOGY | Facility: CLINIC | Age: 59
End: 2019-01-11

## 2019-01-11 ENCOUNTER — APPOINTMENT (OUTPATIENT)
Dept: LAB | Facility: CLINIC | Age: 59
End: 2019-01-11
Attending: HOSPITALIST
Payer: COMMERCIAL

## 2019-01-11 ENCOUNTER — ANTICOAGULATION THERAPY VISIT (OUTPATIENT)
Dept: ANTICOAGULATION | Facility: CLINIC | Age: 59
End: 2019-01-11

## 2019-01-11 VITALS
SYSTOLIC BLOOD PRESSURE: 116 MMHG | RESPIRATION RATE: 22 BRPM | WEIGHT: 171.2 LBS | OXYGEN SATURATION: 96 % | DIASTOLIC BLOOD PRESSURE: 90 MMHG | TEMPERATURE: 98 F | HEIGHT: 64 IN | HEART RATE: 73 BPM | BODY MASS INDEX: 29.23 KG/M2

## 2019-01-11 DIAGNOSIS — I50.23 ACUTE ON CHRONIC SYSTOLIC HEART FAILURE (H): ICD-10-CM

## 2019-01-11 DIAGNOSIS — I50.21 ACUTE SYSTOLIC HEART FAILURE (H): ICD-10-CM

## 2019-01-11 DIAGNOSIS — I50.22 CHRONIC SYSTOLIC HEART FAILURE (H): ICD-10-CM

## 2019-01-11 DIAGNOSIS — Z95.811 LVAD (LEFT VENTRICULAR ASSIST DEVICE) PRESENT (H): Primary | ICD-10-CM

## 2019-01-11 PROBLEM — R53.1 WEAKNESS: Status: ACTIVE | Noted: 2019-01-11

## 2019-01-11 LAB
ANION GAP SERPL CALCULATED.3IONS-SCNC: 9 MMOL/L (ref 3–14)
BUN SERPL-MCNC: 28 MG/DL (ref 7–30)
CALCIUM SERPL-MCNC: 8.3 MG/DL (ref 8.5–10.1)
CHLORIDE SERPL-SCNC: 107 MMOL/L (ref 94–109)
CO2 SERPL-SCNC: 22 MMOL/L (ref 20–32)
CREAT SERPL-MCNC: 1.34 MG/DL (ref 0.66–1.25)
ERYTHROCYTE [DISTWIDTH] IN BLOOD BY AUTOMATED COUNT: 16.5 % (ref 10–15)
ERYTHROCYTE [DISTWIDTH] IN BLOOD BY AUTOMATED COUNT: 16.6 % (ref 10–15)
FACT X ACT/NOR PPP CHRO: 72 % (ref 70–130)
GFR SERPL CREATININE-BSD FRML MDRD: 59 ML/MIN/{1.73_M2}
GLUCOSE BLDC GLUCOMTR-MCNC: 131 MG/DL (ref 70–99)
GLUCOSE BLDC GLUCOMTR-MCNC: 138 MG/DL (ref 70–99)
GLUCOSE BLDC GLUCOMTR-MCNC: 147 MG/DL (ref 70–99)
GLUCOSE BLDC GLUCOMTR-MCNC: 192 MG/DL (ref 70–99)
GLUCOSE SERPL-MCNC: 146 MG/DL (ref 70–99)
HBA1C MFR BLD: 9.4 % (ref 0–5.6)
HCT VFR BLD AUTO: 28.4 % (ref 40–53)
HCT VFR BLD AUTO: 29.9 % (ref 40–53)
HGB BLD-MCNC: 8.6 G/DL (ref 13.3–17.7)
HGB BLD-MCNC: 9.4 G/DL (ref 13.3–17.7)
INR PPP: 1.27 (ref 0.86–1.14)
LMWH PPP CHRO-ACNC: 0.48 IU/ML
LMWH PPP CHRO-ACNC: <0.1 IU/ML
MCH RBC QN AUTO: 27.3 PG (ref 26.5–33)
MCH RBC QN AUTO: 28 PG (ref 26.5–33)
MCHC RBC AUTO-ENTMCNC: 30.3 G/DL (ref 31.5–36.5)
MCHC RBC AUTO-ENTMCNC: 31.4 G/DL (ref 31.5–36.5)
MCV RBC AUTO: 89 FL (ref 78–100)
MCV RBC AUTO: 90 FL (ref 78–100)
PLATELET # BLD AUTO: 426 10E9/L (ref 150–450)
PLATELET # BLD AUTO: 493 10E9/L (ref 150–450)
POTASSIUM SERPL-SCNC: 4.3 MMOL/L (ref 3.4–5.3)
RBC # BLD AUTO: 3.15 10E12/L (ref 4.4–5.9)
RBC # BLD AUTO: 3.36 10E12/L (ref 4.4–5.9)
SODIUM SERPL-SCNC: 138 MMOL/L (ref 133–144)
WBC # BLD AUTO: 11 10E9/L (ref 4–11)
WBC # BLD AUTO: 9.1 10E9/L (ref 4–11)

## 2019-01-11 PROCEDURE — 36415 COLL VENOUS BLD VENIPUNCTURE: CPT | Performed by: HOSPITALIST

## 2019-01-11 PROCEDURE — 85520 HEPARIN ASSAY: CPT | Performed by: PHYSICIAN ASSISTANT

## 2019-01-11 PROCEDURE — 85027 COMPLETE CBC AUTOMATED: CPT | Performed by: HOSPITALIST

## 2019-01-11 PROCEDURE — 25000128 H RX IP 250 OP 636: Performed by: HOSPITALIST

## 2019-01-11 PROCEDURE — 85520 HEPARIN ASSAY: CPT | Performed by: HOSPITALIST

## 2019-01-11 PROCEDURE — 25000131 ZZH RX MED GY IP 250 OP 636 PS 637: Performed by: INTERNAL MEDICINE

## 2019-01-11 PROCEDURE — 12000022 ZZH R&B SNF

## 2019-01-11 PROCEDURE — 25000132 ZZH RX MED GY IP 250 OP 250 PS 637

## 2019-01-11 PROCEDURE — 85027 COMPLETE CBC AUTOMATED: CPT | Performed by: PHYSICIAN ASSISTANT

## 2019-01-11 PROCEDURE — 25000132 ZZH RX MED GY IP 250 OP 250 PS 637: Performed by: THORACIC SURGERY (CARDIOTHORACIC VASCULAR SURGERY)

## 2019-01-11 PROCEDURE — 85610 PROTHROMBIN TIME: CPT | Performed by: PHYSICIAN ASSISTANT

## 2019-01-11 PROCEDURE — 00000146 ZZHCL STATISTIC GLUCOSE BY METER IP

## 2019-01-11 PROCEDURE — 25000132 ZZH RX MED GY IP 250 OP 250 PS 637: Performed by: PHYSICIAN ASSISTANT

## 2019-01-11 PROCEDURE — 25000132 ZZH RX MED GY IP 250 OP 250 PS 637: Performed by: HOSPITALIST

## 2019-01-11 PROCEDURE — 80048 BASIC METABOLIC PNL TOTAL CA: CPT | Performed by: PHYSICIAN ASSISTANT

## 2019-01-11 PROCEDURE — 99232 SBSQ HOSP IP/OBS MODERATE 35: CPT | Mod: GC | Performed by: INTERNAL MEDICINE

## 2019-01-11 PROCEDURE — 83036 HEMOGLOBIN GLYCOSYLATED A1C: CPT | Performed by: HOSPITALIST

## 2019-01-11 PROCEDURE — 25000131 ZZH RX MED GY IP 250 OP 636 PS 637: Performed by: HOSPITALIST

## 2019-01-11 PROCEDURE — 85520 HEPARIN ASSAY: CPT | Performed by: SURGERY

## 2019-01-11 PROCEDURE — 85260 CLOT FACTOR X STUART-POWER: CPT | Performed by: PHYSICIAN ASSISTANT

## 2019-01-11 PROCEDURE — 25000132 ZZH RX MED GY IP 250 OP 250 PS 637: Performed by: SURGERY

## 2019-01-11 PROCEDURE — 36415 COLL VENOUS BLD VENIPUNCTURE: CPT | Performed by: PHYSICIAN ASSISTANT

## 2019-01-11 PROCEDURE — 25000128 H RX IP 250 OP 636: Performed by: STUDENT IN AN ORGANIZED HEALTH CARE EDUCATION/TRAINING PROGRAM

## 2019-01-11 PROCEDURE — 25000132 ZZH RX MED GY IP 250 OP 250 PS 637: Performed by: INTERNAL MEDICINE

## 2019-01-11 PROCEDURE — 36592 COLLECT BLOOD FROM PICC: CPT | Performed by: SURGERY

## 2019-01-11 RX ORDER — HYDRALAZINE HYDROCHLORIDE 50 MG/1
50 TABLET, FILM COATED ORAL EVERY 8 HOURS
Qty: 90 TABLET | Refills: 0 | Status: ON HOLD | DISCHARGE
Start: 2019-01-11 | End: 2019-01-15

## 2019-01-11 RX ORDER — ROSUVASTATIN CALCIUM 20 MG/1
20 TABLET, COATED ORAL AT BEDTIME
Status: DISCONTINUED | OUTPATIENT
Start: 2019-01-12 | End: 2019-01-16 | Stop reason: HOSPADM

## 2019-01-11 RX ORDER — FAMOTIDINE 20 MG/1
20 TABLET, FILM COATED ORAL 2 TIMES DAILY
Status: DISCONTINUED | OUTPATIENT
Start: 2019-01-11 | End: 2019-01-16 | Stop reason: HOSPADM

## 2019-01-11 RX ORDER — ACETAMINOPHEN 650 MG/1
650 SUPPOSITORY RECTAL EVERY 4 HOURS PRN
Status: DISCONTINUED | OUTPATIENT
Start: 2019-01-11 | End: 2019-01-16 | Stop reason: HOSPADM

## 2019-01-11 RX ORDER — DEXTROSE MONOHYDRATE 25 G/50ML
25-50 INJECTION, SOLUTION INTRAVENOUS
Status: DISCONTINUED | OUTPATIENT
Start: 2019-01-11 | End: 2019-01-16 | Stop reason: HOSPADM

## 2019-01-11 RX ORDER — ASPIRIN 81 MG/1
81 TABLET, CHEWABLE ORAL DAILY
Status: DISCONTINUED | OUTPATIENT
Start: 2019-01-12 | End: 2019-01-12

## 2019-01-11 RX ORDER — OXYCODONE HYDROCHLORIDE 5 MG/1
5 TABLET ORAL EVERY 4 HOURS PRN
Status: DISCONTINUED | OUTPATIENT
Start: 2019-01-11 | End: 2019-01-16 | Stop reason: HOSPADM

## 2019-01-11 RX ORDER — HYDRALAZINE HYDROCHLORIDE 50 MG/1
50 TABLET, FILM COATED ORAL EVERY 8 HOURS SCHEDULED
Status: DISCONTINUED | OUTPATIENT
Start: 2019-01-11 | End: 2019-01-11 | Stop reason: HOSPADM

## 2019-01-11 RX ORDER — ROSUVASTATIN CALCIUM 20 MG/1
20 TABLET, COATED ORAL AT BEDTIME
Status: DISCONTINUED | OUTPATIENT
Start: 2019-01-12 | End: 2019-01-11

## 2019-01-11 RX ORDER — AMIODARONE HYDROCHLORIDE 200 MG/1
200 TABLET ORAL DAILY
Status: DISCONTINUED | OUTPATIENT
Start: 2019-01-12 | End: 2019-01-12

## 2019-01-11 RX ORDER — ACETAMINOPHEN 325 MG/1
650 TABLET ORAL EVERY 4 HOURS PRN
Status: DISCONTINUED | OUTPATIENT
Start: 2019-01-11 | End: 2019-01-16 | Stop reason: HOSPADM

## 2019-01-11 RX ORDER — HEPARIN SODIUM 10000 [USP'U]/100ML
0-3500 INJECTION, SOLUTION INTRAVENOUS CONTINUOUS
Status: DISCONTINUED | OUTPATIENT
Start: 2019-01-11 | End: 2019-01-11

## 2019-01-11 RX ORDER — WARFARIN SODIUM 2 MG/1
TABLET ORAL
Qty: 80 TABLET | Refills: 0 | Status: ON HOLD | DISCHARGE
Start: 2019-01-11 | End: 2019-01-15

## 2019-01-11 RX ORDER — NICOTINE POLACRILEX 4 MG
15-30 LOZENGE BUCCAL
Status: DISCONTINUED | OUTPATIENT
Start: 2019-01-11 | End: 2019-01-16 | Stop reason: HOSPADM

## 2019-01-11 RX ORDER — NICOTINE POLACRILEX 4 MG
15-30 LOZENGE BUCCAL
Status: DISCONTINUED | OUTPATIENT
Start: 2019-01-11 | End: 2019-01-11

## 2019-01-11 RX ORDER — FAMOTIDINE 20 MG/1
20 TABLET, FILM COATED ORAL 2 TIMES DAILY
Status: DISCONTINUED | OUTPATIENT
Start: 2019-01-11 | End: 2019-01-11

## 2019-01-11 RX ORDER — HYDRALAZINE HYDROCHLORIDE 50 MG/1
50 TABLET, FILM COATED ORAL EVERY 8 HOURS
Status: DISCONTINUED | OUTPATIENT
Start: 2019-01-11 | End: 2019-01-16 | Stop reason: HOSPADM

## 2019-01-11 RX ORDER — AMOXICILLIN 250 MG
1 CAPSULE ORAL DAILY PRN
Status: DISCONTINUED | OUTPATIENT
Start: 2019-01-11 | End: 2019-01-16 | Stop reason: HOSPADM

## 2019-01-11 RX ORDER — DEXTROSE MONOHYDRATE 25 G/50ML
25-50 INJECTION, SOLUTION INTRAVENOUS
Status: DISCONTINUED | OUTPATIENT
Start: 2019-01-11 | End: 2019-01-11

## 2019-01-11 RX ORDER — FUROSEMIDE 40 MG
20 TABLET ORAL DAILY
Qty: 30 TABLET | Refills: 0 | Status: ON HOLD | DISCHARGE
Start: 2019-01-11 | End: 2019-01-15

## 2019-01-11 RX ORDER — AMOXICILLIN 250 MG
1 CAPSULE ORAL DAILY PRN
Qty: 60 TABLET | Refills: 0 | Status: ON HOLD | DISCHARGE
Start: 2019-01-11 | End: 2019-01-15

## 2019-01-11 RX ORDER — DIGOXIN 125 MCG
125 TABLET ORAL DAILY
Status: DISCONTINUED | OUTPATIENT
Start: 2019-01-12 | End: 2019-01-16 | Stop reason: HOSPADM

## 2019-01-11 RX ORDER — DIGOXIN 125 MCG
125 TABLET ORAL DAILY
Status: DISCONTINUED | OUTPATIENT
Start: 2019-01-12 | End: 2019-01-11

## 2019-01-11 RX ORDER — POLYETHYLENE GLYCOL 3350 17 G/17G
17 POWDER, FOR SOLUTION ORAL DAILY PRN
Status: DISCONTINUED | OUTPATIENT
Start: 2019-01-11 | End: 2019-01-16 | Stop reason: HOSPADM

## 2019-01-11 RX ORDER — NALOXONE HYDROCHLORIDE 0.4 MG/ML
.1-.4 INJECTION, SOLUTION INTRAMUSCULAR; INTRAVENOUS; SUBCUTANEOUS
Status: DISCONTINUED | OUTPATIENT
Start: 2019-01-11 | End: 2019-01-16 | Stop reason: HOSPADM

## 2019-01-11 RX ORDER — FUROSEMIDE 20 MG
20 TABLET ORAL DAILY
Status: DISCONTINUED | OUTPATIENT
Start: 2019-01-12 | End: 2019-01-16 | Stop reason: HOSPADM

## 2019-01-11 RX ORDER — HEPARIN SODIUM 10000 [USP'U]/100ML
0-3500 INJECTION, SOLUTION INTRAVENOUS CONTINUOUS
Status: DISCONTINUED | OUTPATIENT
Start: 2019-01-11 | End: 2019-01-15

## 2019-01-11 RX ORDER — HYDRALAZINE HYDROCHLORIDE 50 MG/1
50 TABLET, FILM COATED ORAL EVERY 8 HOURS SCHEDULED
Status: DISCONTINUED | OUTPATIENT
Start: 2019-01-11 | End: 2019-01-11

## 2019-01-11 RX ORDER — ACETAMINOPHEN 325 MG/1
650 TABLET ORAL EVERY 6 HOURS PRN
Status: DISCONTINUED | OUTPATIENT
Start: 2019-01-11 | End: 2019-01-11

## 2019-01-11 RX ORDER — NITROGLYCERIN 0.4 MG/1
0.4 TABLET SUBLINGUAL EVERY 5 MIN PRN
Status: DISCONTINUED | OUTPATIENT
Start: 2019-01-11 | End: 2019-01-16 | Stop reason: HOSPADM

## 2019-01-11 RX ADMIN — HYDRALAZINE HYDROCHLORIDE 25 MG: 25 TABLET, FILM COATED ORAL at 06:28

## 2019-01-11 RX ADMIN — WARFARIN SODIUM 12.5 MG: 7.5 TABLET ORAL at 18:27

## 2019-01-11 RX ADMIN — HYDRALAZINE HYDROCHLORIDE 50 MG: 50 TABLET ORAL at 23:03

## 2019-01-11 RX ADMIN — AMIODARONE HYDROCHLORIDE 200 MG: 200 TABLET ORAL at 08:03

## 2019-01-11 RX ADMIN — HYDRALAZINE HYDROCHLORIDE 50 MG: 50 TABLET ORAL at 13:44

## 2019-01-11 RX ADMIN — ASPIRIN 81 MG CHEWABLE TABLET 81 MG: 81 TABLET CHEWABLE at 08:02

## 2019-01-11 RX ADMIN — PANTOPRAZOLE SODIUM 40 MG: 40 TABLET, DELAYED RELEASE ORAL at 08:02

## 2019-01-11 RX ADMIN — HEPARIN SODIUM 1500 UNITS/HR: 10000 INJECTION, SOLUTION INTRAVENOUS at 04:40

## 2019-01-11 RX ADMIN — HEPARIN SODIUM 1650 UNITS/HR: 10000 INJECTION, SOLUTION INTRAVENOUS at 23:02

## 2019-01-11 RX ADMIN — HYDRALAZINE HYDROCHLORIDE 25 MG: 25 TABLET, FILM COATED ORAL at 00:53

## 2019-01-11 RX ADMIN — INSULIN GLARGINE 20 UNITS: 100 INJECTION, SOLUTION SUBCUTANEOUS at 21:36

## 2019-01-11 RX ADMIN — HYDRALAZINE HYDROCHLORIDE 50 MG: 50 TABLET ORAL at 17:20

## 2019-01-11 RX ADMIN — INSULIN ASPART 3 UNITS: 100 INJECTION, SOLUTION INTRAVENOUS; SUBCUTANEOUS at 18:28

## 2019-01-11 RX ADMIN — HEPARIN SODIUM 1650 UNITS/HR: 10000 INJECTION, SOLUTION INTRAVENOUS at 17:21

## 2019-01-11 RX ADMIN — ROSUVASTATIN CALCIUM 20 MG: 20 TABLET, FILM COATED ORAL at 08:02

## 2019-01-11 RX ADMIN — DIGOXIN 125 MCG: 125 TABLET ORAL at 08:04

## 2019-01-11 RX ADMIN — SENNOSIDES AND DOCUSATE SODIUM 2 TABLET: 8.6; 5 TABLET ORAL at 08:19

## 2019-01-11 RX ADMIN — FAMOTIDINE 20 MG: 20 TABLET ORAL at 21:38

## 2019-01-11 ASSESSMENT — ACTIVITIES OF DAILY LIVING (ADL)
ADLS_ACUITY_SCORE: 10

## 2019-01-11 ASSESSMENT — MIFFLIN-ST. JEOR
SCORE: 1517.56
SCORE: 1513.93

## 2019-01-11 NOTE — PROGRESS NOTES
"   19 1700   Living Arrangements   Lives With parent(s)   Living Arrangements house   Values Beliefs and Spiritual Care   F: Mishel: Do you have a connection with a mishel community, Yazidism, or Oriental orthodox group? yes   The name of the mishel community, Yazidism, or Oriental orthodox group is: (Zoroastrianism)   Final Resources   Equipment Currently Used at Home none     Social Work: Initial Assessment with Discharge Plan    Patient Name: Mariusz HALL \"Red\" Aron  : 1960  Age: 58 year old  Completed assessment with: patient  Admitted to TCU: 19    Presenting Information   Date of SW assessment: 19  Health Care Directive: none  Primary Health Care Agent: next-of-kin (adult sons) would be surrogate decision-makers if necessary  Secondary Health Care Agent: n/a  Living Situation: he was living with his father in father's house acting as caregiver to father who has Alzheimer's disease  Previous Functional Status: independent  DME available: none  Patient and family understanding of hospitalization: new LVAD  Cultural/Language/Spiritual Considerations: speaks English/Citizen of [place]; Zoroastrianism  Abuse concerns: none  PAS: confirmation number-[id number]  BIMS & PHQ-9 will be completed by unit     Physical Health  s/p Heartmate III LVAD, weakness    Provider Information   Primary Care Physician: [facility]  LVAD SW: Lainey Coffey/________  LVAD Coordinator: Marcy Shahdi    Mental Health:  Diagnosis: none  Current Support/Services: LVAD SW  Previous Services: none  Services Needed/Recommended: health psychologist available if interested during stay    Chemical Dependency:   Diagnosis: n/a  Current Support/Services: n/a  Previous Services: n/a  Services Needed/Recommended: n/a    Support System  Marital Status:   Family support: brother-Romeo (main caregiver; lives within 2 blocks of patient)  Son-Salo (, alternate caregiver); son-Jose M (alternate caregiver)  Other support available: friend-Ryle " (alternate caregiver)  Gaps in support system: none  Son-Maurice (age 14, lives with his mother-Lisa Momin who is patient's ex-fiance', congenial relationship with patient)    Community Resources  Current in home services: none  Previous services: none    Financial/Employment/Education  Employment Status: unemployed/disabled; former job as  until July (left due to medical problems)  Income Source: LTD  Education: some college  [facility]  [facility]: [facility]    Discharge Plan   Patient and family discharge goal: stay with brother/father at [address] after discharge from [facility]  Barriers to discharge: none identified  Disposition: stay with brother/father at [address] after discharge from [facility]  Transportation Needs: family can provide  Name of Transportation Company and Phone: n/a    Additional comments   D:  See H&P for full medical background.  I:  Met with patient to complete assessment and social work consult.  A:  Patient is doing okay.  Supportive family who is very involved and has completed LVAD training.  His friend will have LVAD teaching on Monday.   P:  SW will follow and assist as needed.      LIBIA Ladd  Weekend   Acute Rehab Unit Phone: [phone]  Transitional Care Unit Phone: [phone]

## 2019-01-11 NOTE — DISCHARGE SUMMARY
DISCHARGE                         1/11/2019  2:04 PM  ----------------------------------------------------------------------------  Discharged to: [facility]  Via: Medical transport  Accompanied by: Son  Discharge Instructions: diet, activity, medications, follow up appointments, when to call the MD, aftercare instructions, and what to watchout for (i.e. s/s of infection, increasing SOB, palpitations, chest pain,)  Prescriptions: None to be filled.   Follow Up Appointments: arranged; information given  Belongings: All sent with pt  IV: Keeping for TCU.   Telemetry: off  Pt exhibits understanding of above discharge instructions; all questions answered.    Discharge Paperwork: Signed, copied, and sent home with patient.

## 2019-01-11 NOTE — PROGRESS NOTES
Met with pt and 2 sons today for LVAD education. Pt, brother, and sons are signed off on LVAD education. Brother and son Jose M are signed off on LVAD dressing change (Salo still needs to complete teach-back dressing change). Will do education on Monday, 1/14 12pm-3pm with pt's friend, Ryle, to get signed off on LVAD education and dressing change. After that time, all LVAD education will be complete.   Dressing change completed today by pt's son with supervision of LVAD coordinator. Site is healing, still slightly red, with serosanguinous drainage, suture remains in place. Pt has a slight resolving rash laterally to dressing. Covered with gauze to avoid tape being placed on rash.   Pt transferred to TCU today at 1400.

## 2019-01-11 NOTE — PROGRESS NOTES
New referral for this pt today after LVAD inserted on 12/31/18.  Plan is for pt to go to  TCU on 1/11/19.  Pt is added to the following lists today:  LVAD  Hospitalized  Master   Goal Range: 2-3.  Erasmo PORTER

## 2019-01-11 NOTE — PHARMACY-ANTICOAGULATION SERVICE
Clinical Pharmacy - Warfarin Dosing Consult     Pharmacy has been consulted to manage this patient s warfarin therapy.  Indication: LVAD/RVAD  Provider/Team: Felisha Farah  Warfarin Prior to Admission: Yes  Warfarin PTA Regimen: 4 mg po daily  Significant drug interactions: : aspirin, oral amiodarone, heparin   Recent documented change in oral intake/nutrition: Unknown    INR   Date Value Ref Range Status   01/11/2019 1.27 (H) 0.86 - 1.14 Final   01/10/2019 1.34 (H) 0.86 - 1.14 Final     Chromogenic Factor 10   Date Value Ref Range Status   01/11/2019 72 70 - 130 % Final     Comment:     Therapeutic Range:  A Chromogenic Factor 10 level of approximately 20-40%   inversely correlates with an INR of 2-3 for patients receiving Warfarin.   Chromogenic Factor 10 levels below 20% indicate an INR greater than 3 and   levels above 40% indicate an INR less than 2.         Recommend warfarin 12.5mg today.  Pharmacy will monitor Mariusz Sharp daily and order warfarin doses to achieve specified goal.      Please contact pharmacy as soon as possible if the warfarin needs to be held for a procedure or if the warfarin goals change.

## 2019-01-11 NOTE — PROGRESS NOTES
RN: Pt admitted to room 431 arriving on unit via wheel chair with [facility] at 1420. Pt son also paula and states he changed LVAD dressing prior to pt coming here. PM nurse given report and will complete admission skin check and assessment. Pt alert and oriented x 4 and makes needs known.  LVAD without problems on batteries at this time.  Pt own equipment I boxes have arrived and in pt room, he states he will use those at [facility].

## 2019-01-11 NOTE — PLAN OF CARE
Occupational Therapy Discharge Summary    Reason for therapy discharge:    Discharged to transitional care facility.    Progress towards therapy goal(s). See goals on Care Plan in [facility] electronic health record for goal details.  Goals partially met.  Barriers to achieving goals:   discharge from facility.    Therapy recommendation(s):    Continued therapy is recommended.  Rationale/Recommendations:  To increase IND with ADLs.

## 2019-01-11 NOTE — PLAN OF CARE
D-Pt slept without complaints of discomfort. LVAD values within pt's norm, no alarms noted. Heparin infused continuously during the night.  I-monitored, assessed and addressed pt's status and comfort for changes.   A-uneventful night, pt slept between cares.  P-Continue to monitor, assess and address pt's status and comfort for changes and treat per orders.  Transfer to TCU on hold to monitor elevated creatine level (1.64 ), reassess level with am labs.

## 2019-01-11 NOTE — PROVIDER NOTIFICATION
A web page information sent to the provider Dr. Farah about a need for insulin correction scale parameter for before meals and HS. He was on high resistance dose. Will wait for a response from the MD.

## 2019-01-11 NOTE — PROGRESS NOTES
Care Coordinator - Discharge Planning    Admission Date/Time:  12/29/2018  Attending MD:  Kamaljit Baker MD   Data  Chart reviewed, discussed with interdisciplinary team.   Patient was admitted for:   1. LVAD (left ventricular assist device) present (H)    2. Chronic systolic heart failure (H)    3. Acute systolic heart failure (H)    4. Acute on chronic systolic heart failure (H)         Coordination of Care and Referrals: Provided patient/family with options for outpt INR and Warfarin monitoring with the [facility].   Assessment:   Per MD, pt will need to have his outpt INR and Warfarin monitoring changed to the [facility]: pt is in agreement with this plan.   Intervention:   Arrangements made with the [facility] (Ph: [phone] Fax: [phone]) for INR and Warfarin monitoring (Goal=2-3). Indication for Anticoagulation: LVAD. Dr. Jenni Ortiz to follow.     Plan  Anticipated Discharge Date:  1/11/19  Anticipated Discharge Plan:  Discharge to  TCU.     BRIAN PARKER RN BSN  Care Coordinator Unit   [phone]

## 2019-01-12 ENCOUNTER — APPOINTMENT (OUTPATIENT)
Dept: LAB | Facility: CLINIC | Age: 59
End: 2019-01-12
Attending: HOSPITALIST
Payer: COMMERCIAL

## 2019-01-12 LAB
GLUCOSE BLDC GLUCOMTR-MCNC: 143 MG/DL (ref 70–99)
GLUCOSE BLDC GLUCOMTR-MCNC: 152 MG/DL (ref 70–99)
GLUCOSE BLDC GLUCOMTR-MCNC: 154 MG/DL (ref 70–99)
GLUCOSE BLDC GLUCOMTR-MCNC: 156 MG/DL (ref 70–99)
GLUCOSE BLDC GLUCOMTR-MCNC: 175 MG/DL (ref 70–99)
INR PPP: 1.43 (ref 0.86–1.14)
LMWH PPP CHRO-ACNC: 0.34 IU/ML
LMWH PPP CHRO-ACNC: 0.48 IU/ML

## 2019-01-12 PROCEDURE — 25000132 ZZH RX MED GY IP 250 OP 250 PS 637

## 2019-01-12 PROCEDURE — 85520 HEPARIN ASSAY: CPT | Performed by: HOSPITALIST

## 2019-01-12 PROCEDURE — 97162 PT EVAL MOD COMPLEX 30 MIN: CPT | Mod: GP | Performed by: PHYSICAL THERAPIST

## 2019-01-12 PROCEDURE — 40000133 ZZH STATISTIC OT WARD VISIT: Performed by: OCCUPATIONAL THERAPIST

## 2019-01-12 PROCEDURE — 25000128 H RX IP 250 OP 636: Performed by: HOSPITALIST

## 2019-01-12 PROCEDURE — 99306 1ST NF CARE HIGH MDM 50: CPT | Performed by: HOSPITALIST

## 2019-01-12 PROCEDURE — 25000132 ZZH RX MED GY IP 250 OP 250 PS 637: Performed by: HOSPITALIST

## 2019-01-12 PROCEDURE — 25000131 ZZH RX MED GY IP 250 OP 636 PS 637: Performed by: HOSPITALIST

## 2019-01-12 PROCEDURE — 97535 SELF CARE MNGMENT TRAINING: CPT | Mod: GO | Performed by: OCCUPATIONAL THERAPIST

## 2019-01-12 PROCEDURE — 97116 GAIT TRAINING THERAPY: CPT | Mod: GP | Performed by: PHYSICAL THERAPIST

## 2019-01-12 PROCEDURE — 40000193 ZZH STATISTIC PT WARD VISIT: Performed by: PHYSICAL THERAPIST

## 2019-01-12 PROCEDURE — 12000022 ZZH R&B SNF

## 2019-01-12 PROCEDURE — 97166 OT EVAL MOD COMPLEX 45 MIN: CPT | Mod: GO | Performed by: OCCUPATIONAL THERAPIST

## 2019-01-12 PROCEDURE — 85610 PROTHROMBIN TIME: CPT | Performed by: HOSPITALIST

## 2019-01-12 PROCEDURE — 36415 COLL VENOUS BLD VENIPUNCTURE: CPT | Performed by: HOSPITALIST

## 2019-01-12 PROCEDURE — 97530 THERAPEUTIC ACTIVITIES: CPT | Mod: GP | Performed by: PHYSICAL THERAPIST

## 2019-01-12 PROCEDURE — 00000146 ZZHCL STATISTIC GLUCOSE BY METER IP

## 2019-01-12 PROCEDURE — 25000125 ZZHC RX 250: Performed by: HOSPITALIST

## 2019-01-12 RX ORDER — ASPIRIN 81 MG/1
81 TABLET, CHEWABLE ORAL DAILY
Status: DISCONTINUED | OUTPATIENT
Start: 2019-01-13 | End: 2019-01-16 | Stop reason: HOSPADM

## 2019-01-12 RX ORDER — AMIODARONE HYDROCHLORIDE 200 MG/1
200 TABLET ORAL DAILY
Status: DISCONTINUED | OUTPATIENT
Start: 2019-01-13 | End: 2019-01-16 | Stop reason: HOSPADM

## 2019-01-12 RX ADMIN — HEPARIN SODIUM 1500 UNITS/HR: 10000 INJECTION, SOLUTION INTRAVENOUS at 22:04

## 2019-01-12 RX ADMIN — ASPIRIN 81 MG 81 MG: 81 TABLET ORAL at 08:24

## 2019-01-12 RX ADMIN — FAMOTIDINE 20 MG: 20 TABLET ORAL at 08:25

## 2019-01-12 RX ADMIN — INSULIN ASPART 9 UNITS: 100 INJECTION, SOLUTION INTRAVENOUS; SUBCUTANEOUS at 08:26

## 2019-01-12 RX ADMIN — HYDRALAZINE HYDROCHLORIDE 50 MG: 50 TABLET ORAL at 22:06

## 2019-01-12 RX ADMIN — HYDRALAZINE HYDROCHLORIDE 50 MG: 50 TABLET ORAL at 06:25

## 2019-01-12 RX ADMIN — INSULIN GLARGINE 20 UNITS: 100 INJECTION, SOLUTION SUBCUTANEOUS at 22:06

## 2019-01-12 RX ADMIN — DIGOXIN 125 MCG: 125 TABLET ORAL at 08:24

## 2019-01-12 RX ADMIN — WARFARIN SODIUM 12.5 MG: 7.5 TABLET ORAL at 19:55

## 2019-01-12 RX ADMIN — Medication 5 UNITS: at 08:28

## 2019-01-12 RX ADMIN — FAMOTIDINE 20 MG: 20 TABLET ORAL at 22:06

## 2019-01-12 RX ADMIN — AMIODARONE HYDROCHLORIDE 200 MG: 200 TABLET ORAL at 08:24

## 2019-01-12 RX ADMIN — HEPARIN SODIUM 1500 UNITS/HR: 10000 INJECTION, SOLUTION INTRAVENOUS at 07:26

## 2019-01-12 RX ADMIN — FUROSEMIDE 20 MG: 20 TABLET ORAL at 08:24

## 2019-01-12 RX ADMIN — INSULIN ASPART 1 UNITS: 100 INJECTION, SOLUTION INTRAVENOUS; SUBCUTANEOUS at 08:25

## 2019-01-12 RX ADMIN — HYDRALAZINE HYDROCHLORIDE 50 MG: 50 TABLET ORAL at 15:29

## 2019-01-12 ASSESSMENT — MIFFLIN-ST. JEOR: SCORE: 1513.93

## 2019-01-12 ASSESSMENT — ACTIVITIES OF DAILY LIVING (ADL): PREVIOUS_RESPONSIBILITIES: MEAL PREP;HOUSEKEEPING;LAUNDRY;SHOPPING;YARDWORK;MEDICATION MANAGEMENT;FINANCES;DRIVING;WORK

## 2019-01-12 NOTE — PLAN OF CARE
Alert and oriented x 4. Denied pain MAP was 72 and 82 at bedtime. Good appetite and fluid intake. B/G was 192 dinner time and 143 at bedtime. LVAD dressing was changed by his son ANDREI. Changed old tube site dressing moderate amount dry blood noted. Heparin 10A was <0.10 gave bolus 3900 units around 1723 and it was running at 1650 units until 2330. PICC line dressing intact.

## 2019-01-12 NOTE — PROGRESS NOTES
ADVANCED HEART FAILURE PROGRESS NOTE    SUBJECTIVE:  No acute events. Kidney function improved this morning. Preparing for discharge today.    ROS otherwise negative.    OBJECTIVE:  Vital signs:  Temp: 98  F (36.7  C) Temp  Min: 97.9  F (36.6  C)  Max: 98.6  F (37  C)  Heart Rate: 77 Heart Rate  Min: 73  Max: 78  BP: 116/90 Systolic (24hrs), Av , Min:86 , Max:116   Diastolic (24hrs), Av, Min:67, Max:90    Resp: 22 Resp  Min: 16  Max: 22  SpO2: 96 % SpO2  Min: 96 %  Max: 96 %      Intake/Output Summary (Last 24 hours) at 2019  Last data filed at 2019 1000  Gross per 24 hour   Intake 729.75 ml   Output 975 ml   Net -245.25 ml       Vitals:    19 0740 01/10/19 0943 19 0622   Weight: 79.3 kg (174 lb 12.8 oz) 78.8 kg (173 lb 11.2 oz) 77.7 kg (171 lb 3.2 oz)       Physical Exam:  GEN:  Alert, interactive, appears comfortable, NAD.  HEENT:  JVP about 9 cm  CV:  Regular rate and rhythm, LVAD hum,   LUNGS:  On room air, normal work of breathing, Clear to auscultation bilaterally, no wheezes or crackles.   ABD:  Active bowel sounds, soft, non-tender/non-distended.  No rebound/guarding/rigidity.  EXT:  Trace lower extremity edema.  Hands/feet warm to touch with good signs of peripheral perfusion.   SKIN:  Dry to touch, no exanthems noted in the visualized areas.  NEURO:  Alert and oriented, no new focal deficits appreciated.  PSCYH: Normal mood and affect         Labs:  CMP  Recent Labs   Lab 19  0647 01/10/19  0950 01/10/19  0552  19  0945 19  0434 19  0308 19  0252    139 138   < >  --  138 139  --    POTASSIUM 4.3 4.5 4.4   < >  --  4.2 4.1 3.9   CHLORIDE 107 106 108   < >  --  108 108  --    CO2 22 23 21   < >  --  22 23  --    BUN 28 34* 34*   < >  --   19  --    CR 1.34* 1.64* 1.55*   < >  --  1.19 1.23  --    ARLENE 8.3* 8.2* 8.3*   < >  --  8.2* 7.9*  --    MAG  --   --   --   --   --   --  2.2 2.0   PHOS  --   --   --   --   --   --  2.5 2.5   GLC  146* 204* 217*   < >  --  141* 148*  --    LDH  --   --   --   --  113  --   --   --    ALKPHOS  --   --  55  --   --  47 49  --    BILITOTAL  --   --  0.3  --   --  0.4 0.4  --    AST  --   --  19  --   --  12 18  --    ALT  --   --  52  --   --  73* 103*  --     < > = values in this interval not displayed.     BLOOD GAS  Recent Labs   Lab 01/05/19  0525   PH Incorrect specimen type   PCO2 Incorrect specimen type   PO2 Incorrect specimen type     CBC  Recent Labs   Lab 01/11/19  1537 01/11/19  0647   WBC 11.0 9.1   HGB 9.4* 8.6*   HCT 29.9* 28.4*   * 426     COAG  Recent Labs   Lab 01/11/19  0647 01/10/19  0552   INR 1.27* 1.34*         ASSESSMENT/PLAN:  Mariusz Sharp is a 58 year old male with a history of ICM, CAD (s/p STEMI with ALYSSA to LAD 6/27/18), monomorphic VT, LV thrombus, CKD Stage III, paroxysmal AF, DM Type II who was admitted for placement of LVAD. Now s/p HM3 LVAD 12/31.     HeartMate III LVAD placed 12/31:  - speed 5300 RPM, will hold off further changes for now until ramp study  - on ASA 81  - on heparin drip, warfarin, goal INR 2-3  - dobutamine stopped 1/7, holding beta blocker for now, may resume in clinic  - increased hydralazine to 50 q8h for hypertension (was not on ACE due to CKD, though may be beneficial to be on low dose for additional neurohormonal blockade, can be addressed as outpatient as well)  - lasix held yesterday in the setting of worsening renal function with suspected volume depletion  - on digoxin 125mg daily, last level ok     Ischemic cardiomyopathy, CAD:  - ALYSSA to LAD 6/27/2018  - was not on ACE-I due to CKD  - holding BB after LVAD  - continue rosuvastatin  - was on plavix, holding now after surgery     Paroxysmal afib:  - currently in sinus rhythm  - holding BB after LVAD, on dig  - heparin drip, warfarin     CKD -  - improved this morning down to 1.3      Seen and staffed with Dr. Chase Romo MD  Advanced Heart Failure Fellow  [phone]

## 2019-01-12 NOTE — PLAN OF CARE
PT evaluation completed. Pt is s/p LVAD. Pt transferred with SBA/SV and is ambulating greater than 300 feet with IV pole for steadying assist and SBA. Pt likely to be safe up in room soon. Pt is highly motivated. Recmomending 3-5 days to reach PT goals.

## 2019-01-12 NOTE — PROGRESS NOTES
01/12/19 1400   Living Environment   Lives With parent(s)  (father who has cognitive impairments, but is physically Ind.)   Living Arrangements house   Home Accessibility stairs to enter home   Living Environment Comment PT: 5-6 stairs to enter with one railing, once inside house stairs only to basement- no railing   Self-Care   Usual Activity Tolerance excellent   Current Activity Tolerance fair   Equipment Currently Used at Home none   Activity/Exercise/Self-Care Comment PT: Independent with all ADLs,  at elementary school, runner, driving, very active   Functional Level Prior   Prior Functional Level Comment PT: pt Ind with all ADLS, working full time, driving, exercising regularly and running. Took care of father (helped with meds mostly)   General Information   Onset of Illness/Injury or Date of Surgery - Date 01/11/19   Referring Physician Dr. Hugo Nogueira MD   Patient/Family Goals Statement To get back home APAP, build my endurance, and to get back to a job of some kind - he is not sure what he will physically be able to do for work. Also, to eventually return to exercise and running   Pertinent History of Current Problem (include personal factors and/or comorbidities that impact the POC) Based on pt reporting: Heart attack on June 23rd, but didn't know he had a heart attack. Went to hospital a few days later, and they put a stent in. Eventually, there were other complications, so he went back to hospital with HR over 180, they tried meds that didnt work. Finally, he needed the LVAD on 12/31/18   Precautions/Limitations fall precautions;sternal precautions  (cardiac/LVAD)   General Info Comments PT: per pt report he has passed LVAD test and feels comfortable with all LVAD operations   Cognitive Status Examination   Orientation orientation to person, place and time   Level of Consciousness alert   Follows Commands and Answers Questions 100% of the time   Personal Safety and Judgment intact   Memory  intact   Pain Assessment   Patient Currently in Pain No   Integumentary/Edema   Integumentary/Edema Comments Surgical incision healing with no signs of infection   Posture    Posture Comments PT: forward head/neck, king. forward rounded shoulders and increased thoracic kyphosis (mild)   Range of Motion (ROM)   ROM Comment PT: LE's WNL   Strength   Strength Comments PT: 5/5 king. LEs   Bed Mobility   Bed Mobility Comments PT: R/L rolling Mod I   Transfer Skills   Transfer Comments PT: sit<->stand Mod I with use of IV pole for balance   Gait   Gait Comments PT: See daily treatment flowsheet for details - pt amb. with SBA and IV pole for steadying assist   Balance   Balance Comments PT: Sitting balance - WNL, standing balance - Normal [place] - WNL, feet together eyes open: 30 seconds min sway, eyes closed - mod sway X 30 seconds. SLS: R: 10 seconds, Left 87 seconds.    Sensory Examination   Sensory Perception Comments PT: WNL and symmetrical R/L LE dermatomes   Coordination   Coordination Comments PT: WNL bilaterally for finger to nose, rapid supinaton/pronation, opposite heel to shin and toe tapping   Muscle Tone   Muscle Tone Comments PT: WNL   Modality Interventions   Planned Modality Interventions TENS;Microcurrent Electrical Stimulation;Ultrasound;Cryotherapy   Planned Modality Interventions Comments PRN for pain management   General Therapy Interventions   Planned Therapy Interventions balance training;gait training;groups;manual therapy;neuromuscular re-education;strengthening;transfer training;risk factor education;home program guidelines;progressive activity/exercise   Clinical Impression   Criteria for Skilled Therapeutic Intervention yes, treatment indicated   PT Diagnosis Impaired gait, balance, activity tolerance   Influenced by the following impairments physical deconditioning, post-op precautions/limitations   Functional limitations due to impairments Unable to return to work, ambulate longer distances,  increase risk for falls   Clinical Presentation Evolving/Changing   Clinical Presentation Rationale medical complexity, hospital readmissions, post-op precautions/limitations   Clinical Decision Making (Complexity) Moderate complexity   Therapy Frequency` (30-60 mintues 5-6 days/week)   Predicted Duration of Therapy Intervention (days/wks) (3-5 days)   Anticipated Equipment Needs at Discharge (TBD)   Anticipated Discharge Disposition Home with Assist;Home with Outpatient Therapy   Risk & Benefits of therapy have been explained Yes   Patient, Family & other staff in agreement with plan of care Yes   Clinical Impression Comments Pt admitted to Transitional care unit s/p LVAD procedure. Pt presents with good LE strength, mild gait deviations, balance impairment and decreased activity tolerance/endurance. Pt to benefit from skilled IP PT services to address limitations so pt may return home safely and at maximum level of independence   Therapy Certification   Start of care date 01/12/19   Certification date from 01/12/19   Certification date to 01/18/19   Certification I certify the need for these services furnished under this plan of treatment and while under my care.  (Physician co-signature of this document indicates review and certification of the therapy plan).

## 2019-01-12 NOTE — PROGRESS NOTES
CLINICAL NUTRITION SERVICES - ASSESSMENT NOTE     Nutrition Prescription    RECOMMENDATIONS FOR MDs/PROVIDERS TO ORDER:  None at this time.     Malnutrition Status:    Patient does not meet two of the criteria necessary for diagnosing malnutrition    Recommendations already ordered by Registered Dietitian (RD):  Ordered Room Service with Assist  Resumed scheduled snack    Future/Additional Recommendations:  1. Diet order per MD.   2. Monitor BG control. Hgb A1c 9.4 on 1/11/19. DM2 hx. If BG consistently trending high and patient continues to eat well. May need to consider combination diet.   3. Monitor PO intake, weight trends. Encourage adequate PO of meals TID, snacks, and fluids. Encourage protein intake with each meal.      REASON FOR ASSESSMENT  Mariusz Sharp is a/an 58 year old male assessed by the dietitian for Provider Order - Congestive Heart Failure - Dietitian to instruct patient on 2 gm sodium diet     NUTRITION HISTORY  Per chart review, pt with PMH of ICM, CAD (s/p STEMI with ALYSSA to LAD 6/27/18), monomorphic VT, LV thrombus, CKD Stage III, paroxysmal AF, DM Type II who was admitted for placement of LVAD. Now s/p HM3 LVAD 12/31. Patient followed by RD during previous hospitalization. During previous hospitalization diet changed from 2 gm Na diet to high/low consistent CHO diet. Patient was eating well after surgery. Per RN flowsheets consuming % of meals. Was experiencing some nausea at times though. Last seen by RD 1/08 who provided education on consuming small, frequent meals; importance of adequate intakes post-op; and was given handouts regarding the amounts of protein in various food items and protein goals were discussed.     Per discussion with patient his appetite has been improving since surgery and feels that his appetite is almost back to his baseline. He has been consuming meals TID and finishing the majority, if not all of his meals. He also was previously receiving HS snacks when on  "[employer], which he would like to continue. In regards to low sodium diet education, he reports he has had this education from numerous dietitians in the past.     CURRENT NUTRITION ORDERS  Diet: 2 g Sodium  Intake/Tolerance: Recently admitted yesterday. Good appetite noted, consuming % of meals per RN flowsheet.     LABS  Labs reviewed  Hgb A1c 9.4 (H)  -192 mg/dL     MEDICATIONS  Medications reviewed  Lasix   Insulin aspart 1 unit/10 gm CHO for meals TID, sliding scale  Winifred Pedro     ANTHROPOMETRICS  Height: 162.6 cm (5' 4\")  Most Recent Weight: 77.3 kg (170 lb 6.4 oz)    IBW: 59 kg (130 lbs)  BMI: Overweight BMI 25-29.9  Weight History: Weight below from previous hospitalization. Appears weight with fluctuations, likely variable weight 2/2 fluid status. No significant or severe weight loss noted. Pt also denies any recent large changes in his weight.   01/11/19 0622 77.7 kg (171 lb 3.2 oz)    01/10/19 0943 78.8 kg (173 lb 11.2 oz)    01/09/19 0740 79.3 kg (174 lb 12.8 oz) ZT   01/06/19 2227 80.4 kg (177 lb 4 oz) TK   01/06/19 0400 76.1 kg (167 lb 12.3 oz) TK   01/05/19 0100 82.1 kg (180 lb 16 oz) TK   01/04/19 0500 76.5 kg (168 lb 11.2 oz)    01/03/19 0400 77 kg (169 lb 11.2 oz)    01/02/19 0330 84 kg (185 lb 3 oz) [facility]   01/01/19 0345 83.2 kg (183 lb 6.8 oz) [facility]   12/31/18 0431 75.6 kg (166 lb 10.7 oz)    12/30/18 0138 76.6 kg (168 lb 14.4 oz)      Dosing Weight: 63 kg (adjusted)     ASSESSED NUTRITION NEEDS  Estimated Energy Needs: 0721-4851 kcals/day (25 - 30 kcals/kg)  Justification: Maintenance  Estimated Protein Needs:  grams protein/day (1.5 - 2 grams of pro/kg)  Justification: Post-op LVAD   Estimated Fluid Needs: 1 mL/kcal   Justification: Maintenance or Per provider pending fluid status    PHYSICAL FINDINGS  See malnutrition section below.    MALNUTRITION  % Intake: < 75% for > 7 days (non-severe)  % Weight Loss: Weight loss does not meet criteria  Subcutaneous Fat " Loss: None observed  Muscle Loss: None observed  Fluid Accumulation/Edema: None noted  Malnutrition Diagnosis: Patient does not meet two of the above criteria necessary for diagnosing malnutrition    NUTRITION DIAGNOSIS  Predicted inadequate nutrient intake related to increased needs post-op as evidenced by reliance on PO intake to meet assessed nutrition needs with new dietary limitations.       INTERVENTIONS  Implementation  Nutrition Education: Discussed nutrition history, PO since admission, and role of RD in POC. Encouraged patient to continue good PO intake of meals TID + snacks and to continue choosing high protein foods at each meal. Discussed diet modification to 2 g sodium diet. Pt reports he has had education in the past on sodium from previous dietitians, but open to review. Provided written and verbal education on 2 g sodium diet and tips for following a low-sodium diet. Reviewed foods high in sodium to avoid and foods low in sodium. Educated on label reading and tracking sodium intake as well as seasoning foods without salt. Handouts Reviewed/Provided as follows: Sodium Content of Room Service Menu, Tips for Low-Sodium Diet, Seasoning Food without Salt, Low-Sodium Food and Drinks, Sodium Content of Foods, Heart Health Label Reading. Patient verbalized understanding of education provided with no further questions for writer at this time. Also provided patient with sodium content of foods on Room Service Menu and instructed to limit to 600-700 mg per meal for a total of 2000 mg per day. Also discussed if struggling to order adequate meals, can also order Room Service with Assist to help order adequate meals within his dietary limitations, which patient wanted to try. Patient also requested to re-order previous snack items at HS that he was receiving during hospitalization  Ordered Room Service with Assist per pt request   Modify composition of meals/snacks - Resumed snacks per pt request.     Goals  1.  Patient to consume % of nutritionally adequate meal trays TID, or the equivalent with supplements/snacks.  2. Patient to verbalize/demonstrate ability to follow 2 gm Na diet and verbalize foods high and low in sodium     Monitoring/Evaluation  Progress toward goals will be monitored and evaluated per protocol.    Josy Bee RD, LD  Weekend/On-call Pager: [phone]

## 2019-01-12 NOTE — PLAN OF CARE
RN: New admit from yesterday. Here for teaching, therapy and INR goal monitoring. Alert and oriented. Denies any pain or discomfort. Up independently, steady. Therapy to see him today and evaluate. On Heparin drip that was decreased t0 1500 units/hr from 1650 units/hr at 0020 for Heparin Level of 0.48.  6H after Heparin level drawn and came back 0.34- no change in rate. Level recheck routinely doris AM. VSS with MAP= 73.3 mmHg. LVAD numbers WNL - no alarm noted.  Driveline dressing CDI. Appear to be sleeping/resting between cares. Continue with plan of care.

## 2019-01-12 NOTE — H&P
[facility] ([facility])    History and Physical  Hospitalist       Date of Admission:  1/11/2019  Date of Service (when I saw the patient): 01/12/19    Assessment & Plan      Mariusz Sharp is a 58 year old male with a history of ICM, CAD (s/p STEMI with ALYSSA to LAD 6/27/18), monomorphic VT, LV thrombus, CKD Stage III, paroxysmal AF, DM Type II who was admitted for placement of LVAD. Now s/p HM3 LVAD 12/31. Concern regarding low flow states prompted keeping the patient intubated on 1/1 and on 1/2 following a SELVIN, the patient was able to be extubated. Elevated MAPs required initiating afterload reduction on 1/3 and increased frequency 1/8.      HeartMate III LVAD placed 12/31    - ICM NYHA class IV stage D. LVEF <20%, mild RV dysfunction (improved on dobutamine gtt). S/P HeartMate III on 12/31  - No arrhythmia, HD stable.  Low flows likely related to increased afterload.  - ASA 81 mg, Crestor, digoxin 125 mcg, Amio 200 mg daily, hydralazine 12.5 mg QID.    - on heparin drip, warfarin, goal INR 2-3  - increased hydralazine to 50 q8h for hypertension (was not on ACE due to CKD, though may be beneficial to be on low dose for additional neurohormonal blockade, can be addressed as outpatient as well)  - lasix held yesterday in the setting of worsening renal function with suspected volume depletion     Ischemic cardiomyopathy, CAD    - ALYSSA to LAD 6/27/2018  - was not on ACE-I due to CKD  - holding BB after LVAD  - continue rosuvastatin  - was on plavix, holding now after surgery     Paroxysmal Afib    - Currently in sinus rhythm  - Holding BB after LVAD, on dig  - Heparin drip, warfarin goal INR 2-3     CKD: Improved this morning down to 1.3, lasix on hold.       Chronic Anemia: Hgb 8.8, Plts WNL     Diabetes: Lantus 25 U q PM, prandial dosing 1 U per 10 grams Carb, high corrective sliding scale       DVT Prophylaxis: on Heparin drip  Code Status: Full Code    Disposition: 1-2 weeks    Felisha Farah,  MD    Primary Care Physician      Like He    Chief Complaint     Weakness, SP LVAD    History is obtained from the patient    History of Present Illness     Mariusz Sharp is a 58 year old male with a history of ICM, CAD (s/p STEMI with ALYSSA to LAD 6/27/18), monomorphic VT, LV thrombus, CKD Stage III, paroxysmal AF, DM Type II who was admitted for placement of LVAD. Now s/p HM3 LVAD 12/31. He is not transferred to TCU for further care and strengthening. He was on lasix, its stopped after his creat bumped. He got some fluid prior to coming in. Creat improved with that.     He is overall doing well. No new issues noted. No bleeding. No chest pain or sob.     Past Medical History    I have reviewed this patient's medical history and updated it with pertinent information if needed.     Past Medical History:   Diagnosis Date     Acute kidney injury (H)      CKD (chronic kidney disease)      Coronary artery disease      Diabetes mellitus (H)      HTN (hypertension)      Hyperlipidemia      Ischemic cardiomyopathy      Paroxysmal atrial flutter (H)      Systolic heart failure (H)      Ventricular tachycardia (H)      Past Surgical History      I have reviewed this patient's surgical history and updated it with pertinent information if needed.  Past Surgical History:   Procedure Laterality Date     COLONOSCOPY N/A 12/7/2018    Procedure: COLONOSCOPY;  Surgeon: Krish Mercado MD;  Location:  OR     [facility] CORONARY ANGIOPLASTY ADDL BRANCH      PCI and ALYSSA to ostial LAD on 6/27/18     IMPLANT AUTOMATIC IMPLANTABLE CARDIOVERTER DEFIBRILLATOR       INSERT VENTRICULAR ASSIST DEVICE LEFT (HEARTMATE II) N/A 12/31/2018    Procedure: Median Sternotomy, On Cardiopulmonary Bypass Pump, Insertion of Left Ventricular Assist Device (Heartmate III);  Surgeon: Kamaljit Baker MD;  Location: [facility] OR       Prior to Admission Medications   Prior to Admission Medications   Prescriptions Last Dose Informant Patient Reported? Taking?    Warfarin Therapy Reminder   No No   Si each continuous prn (INR goal 2-3 for LVAD)   acetaminophen (TYLENOL) 325 MG tablet   No No   Sig: Take 2 tablets (650 mg) by mouth every 6 hours as needed for pain   amiodarone (PACERONE/CODARONE) 200 MG tablet  Self No No   Sig: Take 1 tablet (200 mg) by mouth daily   aspirin (ASA) 81 MG chewable tablet   No No   Sig: Take 1 tablet (81 mg) by mouth daily   digoxin (LANOXIN) 125 MCG tablet  Self No No   Sig: Take 1 tablet (125 mcg) by mouth daily   famotidine (PEPCID) 20 MG tablet  Self Yes No   Sig: Take 20 mg by mouth 2 times daily   furosemide (LASIX) 40 MG tablet   No No   Sig: Take 0.5 tablets (20 mg) by mouth daily   heparin  drip 25,000 units in 0.45% NaCl 250 mL (see additional administration details for dose)   No No   Sig: Inject 0-3,500 Units/hr into the vein continuous   hydrALAZINE (APRESOLINE) 50 MG tablet   No No   Sig: Take 1 tablet (50 mg) by mouth every 8 hours   insulin aspart (NOVOLOG PEN) 100 UNIT/ML pen   No No   Sig: Inject 1-10 Units Subcutaneous 3 times daily (before meals) Correction Scale - HIGH INSULIN RESISTANCE DOSING     -164 =1 units.   -189 =2.   -214 =3.   -239 =4.   -264 =5.   -289 =6.   -314 =7.   -339 =8.   -364 =9.  BG greater than or equal to 365 give 10 units  To be given with meal insulin, based on pre-meal BG   insulin aspart (NOVOLOG PEN) 100 UNIT/ML pen   No No   Sig: Inject 1-7 Units Subcutaneous At Bedtime HIGH INSULIN RESISTANCE DOSING    Bedtime  For  - 224 give 1 units.   For  - 249 give 2 units.   For  - 274 give 3 units.   For  - 299 give 4 units.   For  - 324 give 5 units.   For  - 349 give 6 units.   For BG greater than or equal to 350 give 7 units.   insulin aspart (NOVOLOG PEN) 100 UNIT/ML pen   No No   Sig: Prandial Dosin units per 10 grams of carbohydrate each Meal or Snack.  Only chart total amount of units given.  Do  not give if pre-prandial glucose is less than 60 mg/dL. If given at mealtime, administer within 30 minutes of start of meal.   insulin glargine (LANTUS SOLOSTAR PEN) 100 UNIT/ML pen   No No   Sig: Inject 20 Units Subcutaneous At Bedtime   melatonin 1 MG TABS tablet   No No   Sig: Take 1 tablet (1 mg) by mouth nightly as needed for sleep   methocarbamol (ROBAXIN) 750 MG tablet   No No   Sig: Take 1 tablet (750 mg) by mouth 3 times daily as needed for muscle spasms   nitroGLYcerin (NITROSTAT) 0.4 MG sublingual tablet  Self Yes No   Sig: Place 0.4 mg under the tongue every 5 minutes as needed for chest pain For chest pain place 1 tablet under the tongue every 5 minutes for 3 doses. If symptoms persist 5 minutes after 1st dose call 911.   oxyCODONE (ROXICODONE) 5 MG tablet   No No   Sig: Take 1 tablet (5 mg) by mouth every 4 hours as needed for severe pain   polyethylene glycol (MIRALAX/GLYCOLAX) packet   No No   Sig: Take 17 g by mouth daily as needed for constipation   rosuvastatin (CRESTOR) 20 MG tablet  Self No No   Sig: Take 1 tablet (20 mg) by mouth At Bedtime   senna-docusate (SENOKOT-S/PERICOLACE) 8.6-50 MG tablet   No No   Sig: Take 1 tablet by mouth daily as needed for constipation hold for loose stools   warfarin (COUMADIN) 2 MG tablet   No No   Sig: Take 12 mg PO daily until next INR check, then dose per INR goal 2-3      Facility-Administered Medications: None     Allergies   No Known Allergies    Social History   I have reviewed this patient's social history and updated it with pertinent information if needed. Mariusz Sharp  reports that  has never smoked. he has never used smokeless tobacco.    Family History   I have reviewed this patient's family history and updated it with pertinent information if needed.   No family history on file.    Review of Systems   The 10 point Review of Systems is negative other than noted in the HPI or here.     Physical Exam   Temp: 97.6  F (36.4  C) Temp src: Oral BP: (!)  88/71 Pulse: 78   Resp: 16 SpO2: 97 % O2 Device: None (Room air)    Vital Signs with Ranges  Temp:  [97.4  F (36.3  C)-97.9  F (36.6  C)] 97.6  F (36.4  C)  Pulse:  [76-85] 78  Resp:  [16] 16  BP: ()/(66-81) 88/71  SpO2:  [96 %-97 %] 97 %  170 lbs 6.4 oz    Constitutional:  Awake, alert, cooperative, no apparent distress.  Eyes: Conjunctiva and pupils examined and normal.  HEENT: Moist mucous membranes, normal dentition.  Respiratory: Clear to auscultation bilaterally, no crackles or wheezing.  Cardiovascular: LVAD drive line in place, dressed, no heart sound due to LVAD  GI: Soft, non-distended, non-tender, normal bowel sounds.  Lymph/Hematologic: No anterior cervical or supraclavicular adenopathy.  Skin: No rashes, no cyanosis, no edema.  Musculoskeletal: No joint swelling, erythema or tenderness.  Neurologic: Cranial nerves 2-12 intact, normal strength and sensation.  Psychiatric: Alert, oriented to person, place and time, no obvious anxiety or depression.      Data   Data reviewed today:  I personally reviewed no images or EKG's today.  Recent Labs   Lab 01/12/19  0616 01/11/19  1537 01/11/19  0647 01/10/19  0950 01/10/19  0552  01/08/19  0434   WBC  --  11.0 9.1  --  9.3   < > 7.9   HGB  --  9.4* 8.6*  --  8.8*   < > 8.2*   MCV  --  89 90  --  90   < > 90   PLT  --  493* 426  --  420   < > 270   INR 1.43*  --  1.27*  --  1.34*   < > 1.21*   NA  --   --  138 139 138   < > 138   POTASSIUM  --   --  4.3 4.5 4.4   < > 4.2   CHLORIDE  --   --  107 106 108   < > 108   CO2  --   --  22 23 21   < > 22   BUN  --   --  28 34* 34*   < > 21   CR  --   --  1.34* 1.64* 1.55*   < > 1.19   ANIONGAP  --   --  9 9 10   < > 8   ARLENE  --   --  8.3* 8.2* 8.3*   < > 8.2*   GLC  --   --  146* 204* 217*   < > 141*   ALBUMIN  --   --   --   --  2.7*  --  2.6*   PROTTOTAL  --   --   --   --  6.2*  --  6.0*   BILITOTAL  --   --   --   --  0.3  --  0.4   ALKPHOS  --   --   --   --  55  --  47   ALT  --   --   --   --  52  --  73*    AST  --   --   --   --  19  --  12    < > = values in this interval not displayed.       No results found for this or any previous visit (from the past 24 hour(s)).

## 2019-01-12 NOTE — PLAN OF CARE
Knowledgeable about LVAD and independently does safety check and connects LVAD from ac power source to batteries. LVAD numbers and MAP WNL's. Independent in room. Good appetite. Participating in therapies.

## 2019-01-12 NOTE — PLAN OF CARE
OT: assessment complete. TX started, POC and OT goals discussed and in agreement with patient.short term OT needs identified.

## 2019-01-13 ENCOUNTER — APPOINTMENT (OUTPATIENT)
Dept: LAB | Facility: CLINIC | Age: 59
End: 2019-01-13
Attending: HOSPITALIST
Payer: COMMERCIAL

## 2019-01-13 LAB
ALBUMIN SERPL-MCNC: 3.2 G/DL (ref 3.4–5)
ALP SERPL-CCNC: 66 U/L (ref 40–150)
ALT SERPL W P-5'-P-CCNC: 33 U/L (ref 0–70)
ANION GAP SERPL CALCULATED.3IONS-SCNC: 8 MMOL/L (ref 3–14)
AST SERPL W P-5'-P-CCNC: 18 U/L (ref 0–45)
BILIRUB SERPL-MCNC: 0.4 MG/DL (ref 0.2–1.3)
BUN SERPL-MCNC: 24 MG/DL (ref 7–30)
CALCIUM SERPL-MCNC: 8.5 MG/DL (ref 8.5–10.1)
CHLORIDE SERPL-SCNC: 103 MMOL/L (ref 94–109)
CO2 SERPL-SCNC: 25 MMOL/L (ref 20–32)
CREAT SERPL-MCNC: 1.38 MG/DL (ref 0.66–1.25)
ERYTHROCYTE [DISTWIDTH] IN BLOOD BY AUTOMATED COUNT: 16.4 % (ref 10–15)
GFR SERPL CREATININE-BSD FRML MDRD: 57 ML/MIN/{1.73_M2}
GLUCOSE BLDC GLUCOMTR-MCNC: 124 MG/DL (ref 70–99)
GLUCOSE BLDC GLUCOMTR-MCNC: 142 MG/DL (ref 70–99)
GLUCOSE BLDC GLUCOMTR-MCNC: 144 MG/DL (ref 70–99)
GLUCOSE BLDC GLUCOMTR-MCNC: 149 MG/DL (ref 70–99)
GLUCOSE BLDC GLUCOMTR-MCNC: 228 MG/DL (ref 70–99)
GLUCOSE SERPL-MCNC: 167 MG/DL (ref 70–99)
HCT VFR BLD AUTO: 29 % (ref 40–53)
HGB BLD-MCNC: 9.1 G/DL (ref 13.3–17.7)
INR PPP: 1.59 (ref 0.86–1.14)
LDH SERPL L TO P-CCNC: 137 U/L (ref 85–227)
LMWH PPP CHRO-ACNC: 0.28 IU/ML
MCH RBC QN AUTO: 28 PG (ref 26.5–33)
MCHC RBC AUTO-ENTMCNC: 31.4 G/DL (ref 31.5–36.5)
MCV RBC AUTO: 89 FL (ref 78–100)
PLATELET # BLD AUTO: 429 10E9/L (ref 150–450)
POTASSIUM SERPL-SCNC: 4.3 MMOL/L (ref 3.4–5.3)
PROT SERPL-MCNC: 7.2 G/DL (ref 6.8–8.8)
RBC # BLD AUTO: 3.25 10E12/L (ref 4.4–5.9)
SODIUM SERPL-SCNC: 136 MMOL/L (ref 133–144)
WBC # BLD AUTO: 9.1 10E9/L (ref 4–11)

## 2019-01-13 PROCEDURE — 36415 COLL VENOUS BLD VENIPUNCTURE: CPT | Performed by: HOSPITALIST

## 2019-01-13 PROCEDURE — 00000146 ZZHCL STATISTIC GLUCOSE BY METER IP

## 2019-01-13 PROCEDURE — 85610 PROTHROMBIN TIME: CPT | Performed by: HOSPITALIST

## 2019-01-13 PROCEDURE — 36592 COLLECT BLOOD FROM PICC: CPT | Performed by: HOSPITALIST

## 2019-01-13 PROCEDURE — 85027 COMPLETE CBC AUTOMATED: CPT | Performed by: HOSPITALIST

## 2019-01-13 PROCEDURE — 25000132 ZZH RX MED GY IP 250 OP 250 PS 637: Performed by: HOSPITALIST

## 2019-01-13 PROCEDURE — 12000022 ZZH R&B SNF

## 2019-01-13 PROCEDURE — 83615 LACTATE (LD) (LDH) ENZYME: CPT | Performed by: HOSPITALIST

## 2019-01-13 PROCEDURE — 80053 COMPREHEN METABOLIC PANEL: CPT | Performed by: HOSPITALIST

## 2019-01-13 PROCEDURE — 85520 HEPARIN ASSAY: CPT | Performed by: HOSPITALIST

## 2019-01-13 PROCEDURE — 25000132 ZZH RX MED GY IP 250 OP 250 PS 637

## 2019-01-13 PROCEDURE — 25000128 H RX IP 250 OP 636: Performed by: HOSPITALIST

## 2019-01-13 RX ORDER — WARFARIN SODIUM 7.5 MG/1
15 TABLET ORAL
Status: COMPLETED | OUTPATIENT
Start: 2019-01-13 | End: 2019-01-13

## 2019-01-13 RX ADMIN — INSULIN GLARGINE 20 UNITS: 100 INJECTION, SOLUTION SUBCUTANEOUS at 21:36

## 2019-01-13 RX ADMIN — DIGOXIN 125 MCG: 125 TABLET ORAL at 08:19

## 2019-01-13 RX ADMIN — AMIODARONE HYDROCHLORIDE 200 MG: 200 TABLET ORAL at 08:19

## 2019-01-13 RX ADMIN — ROSUVASTATIN CALCIUM 20 MG: 20 TABLET, FILM COATED ORAL at 21:30

## 2019-01-13 RX ADMIN — HEPARIN SODIUM 1500 UNITS/HR: 10000 INJECTION, SOLUTION INTRAVENOUS at 16:22

## 2019-01-13 RX ADMIN — ASPIRIN 81 MG 81 MG: 81 TABLET ORAL at 08:19

## 2019-01-13 RX ADMIN — FAMOTIDINE 20 MG: 20 TABLET ORAL at 08:19

## 2019-01-13 RX ADMIN — HYDRALAZINE HYDROCHLORIDE 50 MG: 50 TABLET ORAL at 22:34

## 2019-01-13 RX ADMIN — WARFARIN SODIUM 15 MG: 7.5 TABLET ORAL at 18:26

## 2019-01-13 RX ADMIN — FAMOTIDINE 20 MG: 20 TABLET ORAL at 21:30

## 2019-01-13 RX ADMIN — HYDRALAZINE HYDROCHLORIDE 50 MG: 50 TABLET ORAL at 06:23

## 2019-01-13 RX ADMIN — HYDRALAZINE HYDROCHLORIDE 50 MG: 50 TABLET ORAL at 13:38

## 2019-01-13 RX ADMIN — FUROSEMIDE 20 MG: 20 TABLET ORAL at 08:19

## 2019-01-13 ASSESSMENT — MIFFLIN-ST. JEOR: SCORE: 1511.66

## 2019-01-13 NOTE — PHARMACY-TCU REVIEW OF H&P
I have reviewed this patient's TCU admission History & Physical for medication related changes/recommendations identified by the admitting provider.  I am confirming that there are no recommendations requiring changes to medication orders are indicated at this time based on the provider recommendations in the H&P.    Mellissa Medina, Pharm.D.  PGY-1 Pharmacy Practice Resident

## 2019-01-13 NOTE — PHARMACY-MEDICATION REGIMEN REVIEW
Pharmacy Medication Regimen Review  Mariusz Sharp is a 58 year old male who is currently in the Transitional Care Unit.    Assessment: All medications have an appropriate indications, durations and no unnecessary use was found.    Plan:   Continue current medication regimen. Reassess prn melatonin use after 14 days (1/25/19).  Attending provider will be sent this note for review.  If there are any emergent issues noted above, pharmacist will contact provider directly by phone.      Pharmacy will periodically review the resident's medication regimen for any PRN medications not administered in > 72 hours and discontinue them. The pharmacist will discuss gradual dose reductions of psychopharmacologic medications with interdisciplinary team on a regular basis.    Please contact pharmacy if the above does not answer specific medication questions/concerns.    Background:  A pharmacist has reviewed all medications and pertinent medical history today.  Medications were reviewed for appropriate use and any irregularities found are listed with recommendations.    Mellissa Medina, Pharm.D.  PGY-1 Pharmacy Practice Resident    Current Facility-Administered Medications:      [START ON 1/14/2019] - Skin Test Reading -, , Does not apply, Q21 Days, Felisha Farah MD     acetaminophen (TYLENOL) Suppository 650 mg, 650 mg, Rectal, Q4H PRN, Felisha Farah MD     acetaminophen (TYLENOL) tablet 650 mg, 650 mg, Oral, Q4H PRN, Felisha Farah MD     amiodarone (PACERONE/CODARONE) tablet 200 mg, 200 mg, Oral, Daily, Felisha Farah MD, 200 mg at 01/13/19 0819     aspirin (ASA) chewable tablet 81 mg, 81 mg, Oral, Daily, Felisha Farah MD, 81 mg at 01/13/19 0819     glucose gel 15-30 g, 15-30 g, Oral, Q15 Min PRN **OR** dextrose 50 % injection 25-50 mL, 25-50 mL, Intravenous, Q15 Min PRN **OR** glucagon injection 1 mg, 1 mg, Subcutaneous, Q15 Min PRN, Tim Mckeon MD     digoxin (LANOXIN)  tablet 125 mcg, 125 mcg, Oral, Daily, Felisha Farah MD, 125 mcg at 01/13/19 0819     famotidine (PEPCID) tablet 20 mg, 20 mg, Oral, BID, Felisha Farah MD, 20 mg at 01/13/19 0819     furosemide (LASIX) tablet 20 mg, 20 mg, Oral, Daily, Felisha Farah MD, 20 mg at 01/13/19 0819     heparin  drip 25,000 units in 0.45% NaCl 250 mL (see additional administration details for dose), 0-3,500 Units/hr, Intravenous, Continuous, Felisha Farah MD, Last Rate: 15 mL/hr at 01/13/19 1622, 1,500 Units/hr at 01/13/19 1622     heparin bolus from infusion pump, , Intravenous, Q6H PRN, Felisha Farah MD, 3,900 Units at 01/11/19 1709     hydrALAZINE (APRESOLINE) tablet 50 mg, 50 mg, Oral, Q8H, Felisha Farah MD, 50 mg at 01/13/19 1338     insulin aspart (NovoLOG) inj (RAPID ACTING), 1-7 Units, Subcutaneous, At Bedtime, Felisha Farah MD     insulin aspart (NovoLOG) inj (RAPID ACTING), 1-10 Units, Subcutaneous, TID AC, Felisha Farah MD, 1 Units at 01/13/19 1340     insulin aspart (NovoLOG) inj (RAPID ACTING), , Subcutaneous, With Snacks or Supplements, Felisha Farah MD     insulin aspart (NovoLOG) inj (RAPID ACTING), , Subcutaneous, Daily with supper, Felisha Farah MD, 6 Units at 01/12/19 1859     insulin aspart (NovoLOG) inj (RAPID ACTING), , Subcutaneous, Daily with lunch, Felisha Farah MD, 5 Units at 01/13/19 1339     insulin aspart (NovoLOG) inj (RAPID ACTING), , Subcutaneous, QAM AC, Felisha Farah MD, 2 Units at 01/13/19 0921     insulin glargine (LANTUS PEN) injection 20 Units, 20 Units, Subcutaneous, At Bedtime, Felisha Farah MD, 20 Units at 01/12/19 2206     medication instructions, , Does not apply, Continuous PRN, Felisha Farah MD     melatonin tablet 1 mg, 1 mg, Oral, At Bedtime PRN, Felisha Farah MD     methocarbamol (ROBAXIN) half-tab 750 mg, 750 mg,  Oral, TID PRN, Felisha Farah MD     naloxone (NARCAN) injection 0.1-0.4 mg, 0.1-0.4 mg, Intravenous, Q2 Min PRN, Felisha Farah MD     nitroGLYcerin (NITROSTAT) sublingual tablet 0.4 mg, 0.4 mg, Sublingual, Q5 Min PRN, Felisha Farah MD     oxyCODONE (ROXICODONE) tablet 5 mg, 5 mg, Oral, Q4H PRN, Felisha Farah MD     Patient is already receiving anticoagulation with heparin, enoxaparin (LOVENOX), warfarin (COUMADIN)  or other anticoagulant medication, , Does not apply, Continuous PRN, Felisha Farah MD     polyethylene glycol (MIRALAX/GLYCOLAX) Packet 17 g, 17 g, Oral, Daily PRN, Felisha Farah MD     rosuvastatin (CRESTOR) tablet 20 mg, 20 mg, Oral, At Bedtime, Felisha Farah MD     senna-docusate (SENOKOT-S/PERICOLACE) 8.6-50 MG per tablet 1 tablet, 1 tablet, Oral, Daily PRN, Felisha Farah MD     tuberculin injection 5 Units, 5 Units, Intradermal, Q21 Days, Felisha Farah MD, 5 Units at 01/12/19 0828     warfarin (COUMADIN) tablet 15 mg, 15 mg, Oral, ONCE at 18:00, Mellissa Medina, [facility]     Warfarin Therapy Reminder (Check START DATE - warfarin may be starting in the FUTURE), 1 each, Does not apply, Continuous PRN, Felisha Farah MD  No current outpatient prescriptions on file.

## 2019-01-13 NOTE — PLAN OF CARE
MAP-81. LVAD numbers WNL's. PI 3.1-3.5. Labs done-see results. Per cardio.-continue to monitor patient. Pt. states he is feeling better. Ate 100% of lunch-sliding scale as ordered. Has stayed connected to ac today-all LVAD batteries are charged for use. Resting comfortably.

## 2019-01-13 NOTE — PROGRESS NOTES
01/12/19 1000   Quick Adds   Quick Adds Certification   Type of Visit Initial Occupational Therapy Evaluation   Living Environment   Lives With parent(s)   Living Arrangements house   Home Accessibility stairs within home   Living Environment Comment 6 steps in then main floor livimng   Self-Care   Usual Activity Tolerance good   Current Activity Tolerance fair   Equipment Currently Used at Home none   Functional Level   Ambulation 0-->independent   Transferring 0-->independent   Toileting 0-->independent   Bathing 0-->independent   Dressing 0-->independent   Eating 0-->independent   Communication 0-->understands/communicates without difficulty   Swallowing 0-->swallows foods/liquids without difficulty   Cognition 0 - no cognition issues reported   Fall history within last six months no   General Information   Onset of Illness/Injury or Date of Surgery - Date 11/13/18   Referring Physician [email or web address]   Patient/Family Goals Statement improve strength and mopbility return home   Additional Occupational Profile Info/Pertinent History of Current Problem less flexibility in R LE sternal and abdominal precautions   Precautions/Limitations fall precautions;abdominal precautions;sternal precautions   Weight-Bearing Status - LUE full weight-bearing   Weight-Bearing Status - RUE full weight-bearing   Weight-Bearing Status - LLE full weight-bearing   Weight-Bearing Status - RLE full weight-bearing   General Observations OT patient pleasant and motivated   Cognitive Status Examination   Orientation orientation to person, place and time   Level of Consciousness alert   Follows Commands (Cognition) WNL   Memory intact   Attention No deficits were identified   Cognitive Comment OT will conintue to observe during functional activity   Visual Perception   Visual Perception Wears glasses   Visual Perception Comments OT prescriptive glasses   Sensory Examination   Sensory Comments WFL   Pain Assessment   Patient Currently in Pain No    Integumentary/Edema   Integumentary/Edema Comments mild bilateral LE edema   Posture   Posture Comments WFL influenced by LVAD gear   Range of Motion (ROM)   ROM Comment N/A due to current restriction   Strength   Strength Comments N/A due to current restriction   Hand Strength   Hand Strength Comments WFL   Muscle Tone Assessment   Muscle Tone Comments mild bilateral LE edema   Coordination   Coordination Comments WFL   Transfer Skill: Bed to Chair/Chair to Bed   Level of [place]: Bed to Chair independent   Transfer Skill: Sit to Stand   Level of [place]: Sit/Stand independent   Transfer Skill: Toilet Transfer   Level of [place]: Toilet independent   Balance   Balance Comments WFL some influence of LVAD gear will conitnue to monitor   Upper Body Dressing   Level of [place]: Dress Upper Body stand-by assist   Physical Assist/Nonphysical Assist: Dress Upper Body set-up required   Lower Body Dressing   Level of [place]: Dress Lower Body independent   Physical Assist/Nonphysical Assist: Dress Lower Body supervision   Toileting   Level of [place]: Toilet independent   Assistive Device grab bars;other (see comments)  (taller toilet)   Grooming   Level of [place]: Grooming stand-by assist   Physical Assist/Nonphysical Assist: Grooming set-up required;supervision;verbal cues   Eating/Self Feeding   Level of [place]: Eating independent   Instrumental Activities of Daily Living (IADL)   Previous Responsibilities meal prep;housekeeping;laundry;shopping;yardwork;medication management;finances;driving;work   Activities of Daily Living Analysis   Impairments Contributing to Impaired Activities of Daily Living flexibility decreased;muscle tone abnormal;post surgical precautions;strength decreased   General Therapy Interventions   Planned Therapy Interventions ADL retraining;IADL retraining;balance training;bed mobility training;strengthening;transfer training;progressive  activity/exercise;risk factor education   Clinical Impression   Criteria for Skilled Therapeutic Interventions Met yes, treatment indicated   OT Diagnosis deconditioning   Influenced by the following impairments post surgical precautions,    Assessment of Occupational Performance 3-5 Performance Deficits   Identified Performance Deficits OT patient below baseline in BADL iADL strength endurance   Clinical Decision Making (Complexity) Moderate complexity   Therapy Frequency daily   Predicted Duration of Therapy Intervention (days/wks) OT goals to be met by 1/15/19   Anticipated Discharge Disposition Home with Assist;Home with Outpatient Therapy   Risks and Benefits of Treatment have been explained. Yes   Patient, Family & other staff in agreement with plan of care Yes   Therapy Certification   Start of Care Date 01/12/19   Certification date from 01/12/19   Certification date to 02/02/19   Medical Diagnosis OT chronic systollic heart failure   Certification I certify the need for these services furnished under this plan of treatment and while under my care.  (Physician co-signature of this document indicates review and certification of the therapy plan).   Total Evaluation Time   Total Evaluation Time (Minutes) 15

## 2019-01-13 NOTE — PLAN OF CARE
Alert and oriented x 4. Independent in his room. Good appetite and fluid intake. B/G was 152 dinner time and 154 at bedtime gave insulin per s/s and carb counts. PICC dressing intact and patent heparin is running at 1500u. LVAD number WDL. Map was 82 at bedtime. Denied having pain or discomfort. Changed LVAD dressing small bloody drainage noted. Changed old tube site dressing moderate amount dry blood noted. Heparin 10A will be done tomorrow.

## 2019-01-13 NOTE — PROGRESS NOTES
Spoke with Cardiology on dizziness and blurry vision. Indiced reported. They reviewed the chart. They reviewed the labs. They recommend just watching him and see how it goes.     Dr frederick

## 2019-01-13 NOTE — PLAN OF CARE
RN: Pt has no c/o pain or discomfort this shift. Heparin drip infusing at 1500units /hr or 15 ml/r No s/s of bleeding noted. Will continue to [facility]. Driveline dressing CDI. LVAD Numbers WNL-- no alarm . BG at 0200= 148. VSS with MAP= 82 mmHg. Scheduled Hydralazine given. Appear to be sleeping/resting. Continue with plan of care.    0700: Heparin 10A level= 0.28- no change in rate. Redraw level tomorrow. INR =1.5. Goal is 2-3, AM nurse updated.

## 2019-01-14 ENCOUNTER — APPOINTMENT (OUTPATIENT)
Dept: LAB | Facility: CLINIC | Age: 59
End: 2019-01-14
Attending: HOSPITALIST
Payer: COMMERCIAL

## 2019-01-14 LAB
ERYTHROCYTE [DISTWIDTH] IN BLOOD BY AUTOMATED COUNT: 16.1 % (ref 10–15)
GLUCOSE BLDC GLUCOMTR-MCNC: 132 MG/DL (ref 70–99)
GLUCOSE BLDC GLUCOMTR-MCNC: 160 MG/DL (ref 70–99)
GLUCOSE BLDC GLUCOMTR-MCNC: 161 MG/DL (ref 70–99)
GLUCOSE BLDC GLUCOMTR-MCNC: 169 MG/DL (ref 70–99)
GLUCOSE BLDC GLUCOMTR-MCNC: 256 MG/DL (ref 70–99)
HCT VFR BLD AUTO: 29.9 % (ref 40–53)
HGB BLD-MCNC: 9.4 G/DL (ref 13.3–17.7)
INR PPP: 1.89 (ref 0.86–1.14)
LMWH PPP CHRO-ACNC: 0.34 IU/ML
MCH RBC QN AUTO: 27.8 PG (ref 26.5–33)
MCHC RBC AUTO-ENTMCNC: 31.4 G/DL (ref 31.5–36.5)
MCV RBC AUTO: 89 FL (ref 78–100)
PLATELET # BLD AUTO: 465 10E9/L (ref 150–450)
RBC # BLD AUTO: 3.38 10E12/L (ref 4.4–5.9)
WBC # BLD AUTO: 8.9 10E9/L (ref 4–11)

## 2019-01-14 PROCEDURE — 25000128 H RX IP 250 OP 636: Performed by: HOSPITALIST

## 2019-01-14 PROCEDURE — 97110 THERAPEUTIC EXERCISES: CPT | Mod: GP

## 2019-01-14 PROCEDURE — 97535 SELF CARE MNGMENT TRAINING: CPT | Mod: GO | Performed by: OCCUPATIONAL THERAPIST

## 2019-01-14 PROCEDURE — 12000022 ZZH R&B SNF

## 2019-01-14 PROCEDURE — 97110 THERAPEUTIC EXERCISES: CPT | Mod: GP | Performed by: PHYSICAL THERAPIST

## 2019-01-14 PROCEDURE — 40000193 ZZH STATISTIC PT WARD VISIT: Performed by: PHYSICAL THERAPIST

## 2019-01-14 PROCEDURE — 25000132 ZZH RX MED GY IP 250 OP 250 PS 637: Performed by: HOSPITALIST

## 2019-01-14 PROCEDURE — 97116 GAIT TRAINING THERAPY: CPT | Mod: GP

## 2019-01-14 PROCEDURE — 85520 HEPARIN ASSAY: CPT | Performed by: HOSPITALIST

## 2019-01-14 PROCEDURE — 36415 COLL VENOUS BLD VENIPUNCTURE: CPT | Performed by: HOSPITALIST

## 2019-01-14 PROCEDURE — 40000193 ZZH STATISTIC PT WARD VISIT

## 2019-01-14 PROCEDURE — 40000133 ZZH STATISTIC OT WARD VISIT: Performed by: OCCUPATIONAL THERAPIST

## 2019-01-14 PROCEDURE — 97530 THERAPEUTIC ACTIVITIES: CPT | Mod: GP

## 2019-01-14 PROCEDURE — 99309 SBSQ NF CARE MODERATE MDM 30: CPT | Performed by: HOSPITALIST

## 2019-01-14 PROCEDURE — 85027 COMPLETE CBC AUTOMATED: CPT | Performed by: HOSPITALIST

## 2019-01-14 PROCEDURE — 85610 PROTHROMBIN TIME: CPT | Performed by: HOSPITALIST

## 2019-01-14 PROCEDURE — 97116 GAIT TRAINING THERAPY: CPT | Mod: GP | Performed by: PHYSICAL THERAPIST

## 2019-01-14 PROCEDURE — 97530 THERAPEUTIC ACTIVITIES: CPT | Mod: GO | Performed by: OCCUPATIONAL THERAPIST

## 2019-01-14 PROCEDURE — 00000146 ZZHCL STATISTIC GLUCOSE BY METER IP

## 2019-01-14 RX ADMIN — HEPARIN SODIUM 1500 UNITS/HR: 10000 INJECTION, SOLUTION INTRAVENOUS at 22:53

## 2019-01-14 RX ADMIN — FUROSEMIDE 20 MG: 20 TABLET ORAL at 07:41

## 2019-01-14 RX ADMIN — HYDRALAZINE HYDROCHLORIDE 50 MG: 50 TABLET ORAL at 06:45

## 2019-01-14 RX ADMIN — HYDRALAZINE HYDROCHLORIDE 50 MG: 50 TABLET ORAL at 22:17

## 2019-01-14 RX ADMIN — AMIODARONE HYDROCHLORIDE 200 MG: 200 TABLET ORAL at 07:41

## 2019-01-14 RX ADMIN — FAMOTIDINE 20 MG: 20 TABLET ORAL at 22:17

## 2019-01-14 RX ADMIN — HYDRALAZINE HYDROCHLORIDE 50 MG: 50 TABLET ORAL at 14:40

## 2019-01-14 RX ADMIN — FAMOTIDINE 20 MG: 20 TABLET ORAL at 07:41

## 2019-01-14 RX ADMIN — ASPIRIN 81 MG 81 MG: 81 TABLET ORAL at 07:41

## 2019-01-14 RX ADMIN — WARFARIN SODIUM 12.5 MG: 7.5 TABLET ORAL at 17:33

## 2019-01-14 RX ADMIN — ROSUVASTATIN CALCIUM 20 MG: 20 TABLET, FILM COATED ORAL at 22:17

## 2019-01-14 RX ADMIN — DIGOXIN 125 MCG: 125 TABLET ORAL at 07:40

## 2019-01-14 RX ADMIN — HEPARIN SODIUM 1500 UNITS/HR: 10000 INJECTION, SOLUTION INTRAVENOUS at 07:09

## 2019-01-14 RX ADMIN — INSULIN GLARGINE 20 UNITS: 100 INJECTION, SOLUTION SUBCUTANEOUS at 22:18

## 2019-01-14 ASSESSMENT — MIFFLIN-ST. JEOR: SCORE: 1504.4

## 2019-01-14 NOTE — PROGRESS NOTES
[facility] ([facility])    Hospitalist Progress Note    Date of Service (when I saw the patient): 01/14/2019    Assessment & Plan     Assessment & Plan         Mariusz Sharp is a 58 year old male with a history of ICM, CAD (s/p STEMI with ALYSSA to LAD 6/27/18), monomorphic VT, LV thrombus, CKD Stage III, paroxysmal AF, DM Type II who was admitted for placement of LVAD. Now s/p HM3 LVAD 12/31. Concern regarding low flow states prompted keeping the patient intubated on 1/1 and on 1/2 following a SELVIN, the patient was able to be extubated. Elevated MAPs required initiating afterload reduction on 1/3 and increased frequency 1/8.       HeartMate III LVAD placed 12/31     - ICM NYHA class IV stage D. LVEF <20%, mild RV dysfunction (improved on dobutamine gtt). S/P HeartMate III on 12/31  - No arrhythmia, HD stable.  Low flows likely related to increased afterload.  - ASA 81 mg, Crestor, digoxin 125 mcg, Amio 200 mg daily, hydralazine 50 mg QID.    - on heparin drip, warfarin, goal INR 2-3. He will likely come off heparin drip tomorrow.   - increased hydralazine to 50 q8h for hypertension (was not on ACE due to CKD, though may be beneficial to be on low dose for additional neurohormonal blockade, can be addressed as outpatient as well)  - lasix held in the setting of worsening renal function with suspected volume depletion but now restarted at 20  - monitor, weight, IO and Creat, MAP and PIs  -Has high PIs and MAPs. But he is sensitive to medications. So cardiology did not want to change anything. They have recently started on lasix and gone up on hydralazine. Monitor how things go for now. I spoke with LAD coordinator and Fellow on call. Labs look fine.      Ischemic cardiomyopathy, CAD     - ALYSSA to LAD 6/27/2018  - was not on ACE-I due to CKD  - holding BB after LVAD  - continue rosuvastatin  - was on plavix, holding now after surgery     Paroxysmal Afib     - Currently in sinus rhythm  - Holding BB after LVAD, on  dig  - Heparin drip, warfarin goal INR 2-3     CKD: Improved this morning down to 1.3, lasix on hold.       Chronic Anemia: Hgb 8.8, Plts WNL     Diabetes: Lantus 25 U q PM, prandial dosing 1 U per 10 grams Carb, high corrective sliding scale        DVT Prophylaxis: on Heparin drip  Code Status: Full Code     Disposition: 1-2 weeks     MD Felisha Damico MD    Interval History     No more dizziness. No vision changes. He had a episode of dizziness and blurry vision yesterday but resolved. No chest pain or sob. No new issues.     -Data reviewed today: I reviewed all new labs and imaging results over the last 24 hours. I personally reviewed no images or EKG's today.    Physical Exam   Temp: 97  F (36.1  C) Temp src: Oral BP: 98/83 Pulse: 77 Heart Rate: 81 Resp: 16 SpO2: 97 % O2 Device: None (Room air)    Vitals:    01/12/19 0558 01/13/19 0550 01/14/19 0509   Weight: 77.3 kg (170 lb 6.4 oz) 77.1 kg (169 lb 14.4 oz) 76.3 kg (168 lb 4.8 oz)     Vital Signs with Ranges  Temp:  [97  F (36.1  C)-98.5  F (36.9  C)] 97  F (36.1  C)  Pulse:  [71-77] 77  Heart Rate:  [78-88] 81  Resp:  [16-18] 16  BP: ()/(75-87) 98/83  SpO2:  [95 %-97 %] 97 %  I/O last 3 completed shifts:  In: 840 [P.O.:840]  Out: -     Constitutional:  Awake, alert, cooperative, no apparent distress  Respiratory: Clear to auscultation bilaterally, no crackles or wheezing  Cardiovascular: on LVAD, has mechanical sounds  GI: Normal bowel sounds, soft, non-distended, non-tender  Skin/Integumen: No rashes, no cyanosis, no edema      Medications     HEParin 1,500 Units/hr (01/14/19 0709)     - MEDICATION INSTRUCTIONS -       - MEDICATION INSTRUCTIONS -       Warfarin Therapy Reminder         - Skin Test Reading (tuberculin) -   Does not apply Q21 Days     amiodarone  200 mg Oral Daily     aspirin  81 mg Oral Daily     digoxin  125 mcg Oral Daily     famotidine  20 mg Oral BID     furosemide  20 mg Oral Daily     hydrALAZINE  50 mg  Oral Q8H     insulin aspart  1-7 Units Subcutaneous At Bedtime     insulin aspart  1-10 Units Subcutaneous TID AC     insulin aspart   Subcutaneous Daily with supper     insulin aspart   Subcutaneous Daily with lunch     insulin aspart   Subcutaneous QAM AC     insulin glargine  20 Units Subcutaneous At Bedtime     rosuvastatin  20 mg Oral At Bedtime     tuberculin  5 Units Intradermal Q21 Days     warfarin  12.5 mg Oral ONCE at 18:00       Data   Recent Labs   Lab 01/14/19  0619 01/13/19  1224 01/13/19  0622 01/12/19  0616 01/11/19  1537 01/11/19  0647 01/10/19  0950 01/10/19  0552   WBC 8.9 9.1  --   --  11.0 9.1  --  9.3   HGB 9.4* 9.1*  --   --  9.4* 8.6*  --  8.8*   MCV 89 89  --   --  89 90  --  90   * 429  --   --  493* 426  --  420   INR 1.89*  --  1.59* 1.43*  --  1.27*  --  1.34*   NA  --  136  --   --   --  138 139 138   POTASSIUM  --  4.3  --   --   --  4.3 4.5 4.4   CHLORIDE  --  103  --   --   --  107 106 108   CO2  --  25  --   --   --  22 23 21   BUN  --  24  --   --   --  28 34* 34*   CR  --  1.38*  --   --   --  1.34* 1.64* 1.55*   ANIONGAP  --  8  --   --   --  9 9 10   ARLENE  --  8.5  --   --   --  8.3* 8.2* 8.3*   GLC  --  167*  --   --   --  146* 204* 217*   ALBUMIN  --  3.2*  --   --   --   --   --  2.7*   PROTTOTAL  --  7.2  --   --   --   --   --  6.2*   BILITOTAL  --  0.4  --   --   --   --   --  0.3   ALKPHOS  --  66  --   --   --   --   --  55   ALT  --  33  --   --   --   --   --  52   AST  --  18  --   --   --   --   --  19       No results found for this or any previous visit (from the past 24 hour(s)).

## 2019-01-14 NOTE — PLAN OF CARE
PT: patient mod I for mobility in room; supervision for longer gait distances for safety due impulsivity and contacting obstacles in hallway when using IV pole. Focused on cardiovascular conditioning via Nustep x 10 minutes, level 3

## 2019-01-14 NOTE — PLAN OF CARE
Discharge Planner OT   Patient plan for discharge: [facility] with [facility]  Current status: Pt meeting goals quickly - (I) with functional transfers and SBA-(I) for mobility with no AD. Pt educated on EC/WS strategies for ADL/IADLs - pt able to complete kitchen task and light household chores with (I). Pt's HR 77-83, O2 sats 97-98% on RA, pre BP 74/60 (MAP 63), post 100/81 (92) - after 5 min rest BP 96/81 (87).  Barriers to return to prior living situation: pain, surgical precautions  Recommendations for discharge: [facility] with [facility]  Rationale for recommendations: to progress endurance/strength for ADL/IADLs       Entered by: Raegan Bullock 01/14/2019 11:22 AM

## 2019-01-14 NOTE — PLAN OF CARE
RN: Pt has no c/o pain or discomfort this shift. Heparin infusing at 1500u/hr via PICC overnight and hep10A level this am is 0.34- no change in rate. INR- 1.89 with goal = 2-3. Next level recheck tomorrow AM. No s/s of bleeding noted. Will continue to monitor. LVAD numbers WNL- no alarm noted. MAP this am 91.7mmHg, asymprtomatic. Scheduled Hydralazine 50 mg tab given. Continue lisa plan of care.

## 2019-01-14 NOTE — PROGRESS NOTES
Writer introduced self and explained role.   provided patient with printout of orders and initial care plan goals which were given on 1/13 for patient for review.  Writer explained this is an overview and available to answer questions as able.  If further questions, then writer can direct patient to the nurse, provider, therapy staff or interdisciplinary team member as needed.  Patient didn't have any questions at this time.  Copies of orders and care plan placed in chart.  Writer also told patient that the unit  would meet in the coming days to complete other assessments.

## 2019-01-14 NOTE — PLAN OF CARE
Alert and oriented x 4. Denied having pain and discomfort. LVAD number WDL MAP was 91,88.76. Changed LVAD dressing scant serosanguinous drainage noted. B/G was  149 at dinner time and 228 at bedtime gave insulin per s/s and carb counts. Voided good. Independent in his room. Heparin is running at 1500u 10A will be done tomorrow.

## 2019-01-14 NOTE — PLAN OF CARE
Alert and oriented x4. Independent in his room. Patient denies pain. He was seen by LVAD coordinator, dressing change completed. LVAD numbers within normal range. Patient on continuous heparin drip at 1500 units/hr.

## 2019-01-15 ENCOUNTER — APPOINTMENT (OUTPATIENT)
Dept: LAB | Facility: CLINIC | Age: 59
End: 2019-01-15
Attending: HOSPITALIST
Payer: COMMERCIAL

## 2019-01-15 DIAGNOSIS — Z95.811 LVAD (LEFT VENTRICULAR ASSIST DEVICE) PRESENT (H): Primary | ICD-10-CM

## 2019-01-15 DIAGNOSIS — I50.22 CHRONIC SYSTOLIC CONGESTIVE HEART FAILURE (H): ICD-10-CM

## 2019-01-15 LAB
ANION GAP SERPL CALCULATED.3IONS-SCNC: 10 MMOL/L (ref 3–14)
BUN SERPL-MCNC: 27 MG/DL (ref 7–30)
CALCIUM SERPL-MCNC: 8.9 MG/DL (ref 8.5–10.1)
CHLORIDE SERPL-SCNC: 101 MMOL/L (ref 94–109)
CO2 SERPL-SCNC: 26 MMOL/L (ref 20–32)
CREAT SERPL-MCNC: 1.51 MG/DL (ref 0.66–1.25)
ERYTHROCYTE [DISTWIDTH] IN BLOOD BY AUTOMATED COUNT: 16.2 % (ref 10–15)
GFR SERPL CREATININE-BSD FRML MDRD: 51 ML/MIN/{1.73_M2}
GLUCOSE BLDC GLUCOMTR-MCNC: 116 MG/DL (ref 70–99)
GLUCOSE BLDC GLUCOMTR-MCNC: 122 MG/DL (ref 70–99)
GLUCOSE BLDC GLUCOMTR-MCNC: 143 MG/DL (ref 70–99)
GLUCOSE BLDC GLUCOMTR-MCNC: 195 MG/DL (ref 70–99)
GLUCOSE BLDC GLUCOMTR-MCNC: 307 MG/DL (ref 70–99)
GLUCOSE SERPL-MCNC: 139 MG/DL (ref 70–99)
HCT VFR BLD AUTO: 31.9 % (ref 40–53)
HGB BLD-MCNC: 10 G/DL (ref 13.3–17.7)
INR PPP: 2.62 (ref 0.86–1.14)
LMWH PPP CHRO-ACNC: 0.39 IU/ML
MCH RBC QN AUTO: 27.8 PG (ref 26.5–33)
MCHC RBC AUTO-ENTMCNC: 31.3 G/DL (ref 31.5–36.5)
MCV RBC AUTO: 89 FL (ref 78–100)
PLATELET # BLD AUTO: 485 10E9/L (ref 150–450)
POTASSIUM SERPL-SCNC: 4.3 MMOL/L (ref 3.4–5.3)
RBC # BLD AUTO: 3.6 10E12/L (ref 4.4–5.9)
SODIUM SERPL-SCNC: 137 MMOL/L (ref 133–144)
WBC # BLD AUTO: 9.3 10E9/L (ref 4–11)

## 2019-01-15 PROCEDURE — 25000132 ZZH RX MED GY IP 250 OP 250 PS 637: Performed by: HOSPITALIST

## 2019-01-15 PROCEDURE — 36415 COLL VENOUS BLD VENIPUNCTURE: CPT | Performed by: HOSPITALIST

## 2019-01-15 PROCEDURE — 97116 GAIT TRAINING THERAPY: CPT | Mod: GP

## 2019-01-15 PROCEDURE — 40000193 ZZH STATISTIC PT WARD VISIT

## 2019-01-15 PROCEDURE — 85610 PROTHROMBIN TIME: CPT | Performed by: HOSPITALIST

## 2019-01-15 PROCEDURE — 85027 COMPLETE CBC AUTOMATED: CPT | Performed by: HOSPITALIST

## 2019-01-15 PROCEDURE — 12000022 ZZH R&B SNF

## 2019-01-15 PROCEDURE — 99316 NF DSCHRG MGMT 30 MIN+: CPT | Performed by: INTERNAL MEDICINE

## 2019-01-15 PROCEDURE — 25000128 H RX IP 250 OP 636: Performed by: INTERNAL MEDICINE

## 2019-01-15 PROCEDURE — 97535 SELF CARE MNGMENT TRAINING: CPT | Mod: GO | Performed by: OCCUPATIONAL THERAPIST

## 2019-01-15 PROCEDURE — 85520 HEPARIN ASSAY: CPT | Performed by: HOSPITALIST

## 2019-01-15 PROCEDURE — 40000133 ZZH STATISTIC OT WARD VISIT: Performed by: OCCUPATIONAL THERAPIST

## 2019-01-15 PROCEDURE — 97110 THERAPEUTIC EXERCISES: CPT | Mod: GP

## 2019-01-15 PROCEDURE — 80048 BASIC METABOLIC PNL TOTAL CA: CPT | Performed by: HOSPITALIST

## 2019-01-15 PROCEDURE — 00000146 ZZHCL STATISTIC GLUCOSE BY METER IP

## 2019-01-15 PROCEDURE — 25000132 ZZH RX MED GY IP 250 OP 250 PS 637: Performed by: INTERNAL MEDICINE

## 2019-01-15 RX ORDER — FUROSEMIDE 20 MG
20 TABLET ORAL DAILY
Qty: 30 TABLET | Refills: 0 | Status: SHIPPED | OUTPATIENT
Start: 2019-01-15 | End: 2019-01-23

## 2019-01-15 RX ORDER — POLYETHYLENE GLYCOL 3350 17 G/17G
17 POWDER, FOR SOLUTION ORAL DAILY PRN
Qty: 14 PACKET | Refills: 0 | Status: SHIPPED | OUTPATIENT
Start: 2019-01-15 | End: 2019-02-11

## 2019-01-15 RX ORDER — WARFARIN SODIUM 2.5 MG/1
TABLET ORAL
Qty: 200 TABLET | Refills: 1 | Status: SHIPPED | OUTPATIENT
Start: 2019-01-15 | End: 2019-01-16

## 2019-01-15 RX ORDER — NITROGLYCERIN 0.4 MG/1
0.4 TABLET SUBLINGUAL EVERY 5 MIN PRN
Qty: 10 TABLET | Refills: 0 | Status: ON HOLD | OUTPATIENT
Start: 2019-01-15 | End: 2020-05-29

## 2019-01-15 RX ORDER — HEPARIN SODIUM,PORCINE 10 UNIT/ML
5-10 VIAL (ML) INTRAVENOUS
Status: DISCONTINUED | OUTPATIENT
Start: 2019-01-15 | End: 2019-01-16 | Stop reason: HOSPADM

## 2019-01-15 RX ORDER — ASPIRIN 81 MG/1
81 TABLET, CHEWABLE ORAL DAILY
Qty: 30 TABLET | Refills: 0 | Status: SHIPPED | OUTPATIENT
Start: 2019-01-15 | End: 2019-02-11

## 2019-01-15 RX ORDER — AMOXICILLIN 250 MG
1 CAPSULE ORAL DAILY PRN
Qty: 60 TABLET | Refills: 0 | Status: ON HOLD | OUTPATIENT
Start: 2019-01-15 | End: 2019-07-08

## 2019-01-15 RX ORDER — WARFARIN SODIUM 7.5 MG/1
7.5 TABLET ORAL
Status: COMPLETED | OUTPATIENT
Start: 2019-01-15 | End: 2019-01-15

## 2019-01-15 RX ORDER — ACETAMINOPHEN 325 MG/1
650 TABLET ORAL EVERY 6 HOURS PRN
Qty: 1 BOTTLE | Refills: 0 | Status: ON HOLD | OUTPATIENT
Start: 2019-01-15 | End: 2019-07-08

## 2019-01-15 RX ORDER — AMIODARONE HYDROCHLORIDE 200 MG/1
200 TABLET ORAL DAILY
Qty: 30 TABLET | Refills: 0 | Status: SHIPPED | OUTPATIENT
Start: 2019-01-15 | End: 2019-02-11

## 2019-01-15 RX ORDER — HYDRALAZINE HYDROCHLORIDE 50 MG/1
50 TABLET, FILM COATED ORAL 3 TIMES DAILY
Qty: 90 TABLET | Refills: 0 | Status: SHIPPED | OUTPATIENT
Start: 2019-01-15 | End: 2019-01-23

## 2019-01-15 RX ORDER — HEPARIN SODIUM,PORCINE 10 UNIT/ML
5-10 VIAL (ML) INTRAVENOUS EVERY 24 HOURS
Status: DISCONTINUED | OUTPATIENT
Start: 2019-01-15 | End: 2019-01-16 | Stop reason: HOSPADM

## 2019-01-15 RX ORDER — LIDOCAINE 40 MG/G
CREAM TOPICAL
Status: DISCONTINUED | OUTPATIENT
Start: 2019-01-15 | End: 2019-01-16 | Stop reason: HOSPADM

## 2019-01-15 RX ORDER — DIGOXIN 125 MCG
125 TABLET ORAL DAILY
Qty: 30 TABLET | Refills: 0 | Status: SHIPPED | OUTPATIENT
Start: 2019-01-15 | End: 2019-02-11

## 2019-01-15 RX ORDER — ROSUVASTATIN CALCIUM 20 MG/1
20 TABLET, COATED ORAL AT BEDTIME
Qty: 30 TABLET | Refills: 0 | Status: SHIPPED | OUTPATIENT
Start: 2019-01-15 | End: 2019-02-11

## 2019-01-15 RX ORDER — FAMOTIDINE 20 MG/1
20 TABLET, FILM COATED ORAL 2 TIMES DAILY
Qty: 60 TABLET | Refills: 0 | Status: SHIPPED | OUTPATIENT
Start: 2019-01-15 | End: 2019-02-11

## 2019-01-15 RX ADMIN — AMIODARONE HYDROCHLORIDE 200 MG: 200 TABLET ORAL at 08:18

## 2019-01-15 RX ADMIN — HYDRALAZINE HYDROCHLORIDE 50 MG: 50 TABLET ORAL at 22:03

## 2019-01-15 RX ADMIN — FUROSEMIDE 20 MG: 20 TABLET ORAL at 08:18

## 2019-01-15 RX ADMIN — HYDRALAZINE HYDROCHLORIDE 50 MG: 50 TABLET ORAL at 14:16

## 2019-01-15 RX ADMIN — Medication 5 ML: at 11:25

## 2019-01-15 RX ADMIN — ROSUVASTATIN CALCIUM 20 MG: 20 TABLET, FILM COATED ORAL at 22:03

## 2019-01-15 RX ADMIN — WARFARIN SODIUM 7.5 MG: 7.5 TABLET ORAL at 18:21

## 2019-01-15 RX ADMIN — INSULIN GLARGINE 20 UNITS: 100 INJECTION, SOLUTION SUBCUTANEOUS at 22:07

## 2019-01-15 RX ADMIN — DIGOXIN 125 MCG: 125 TABLET ORAL at 08:17

## 2019-01-15 RX ADMIN — FAMOTIDINE 20 MG: 20 TABLET ORAL at 22:03

## 2019-01-15 RX ADMIN — ASPIRIN 81 MG 81 MG: 81 TABLET ORAL at 08:18

## 2019-01-15 RX ADMIN — HYDRALAZINE HYDROCHLORIDE 50 MG: 50 TABLET ORAL at 07:00

## 2019-01-15 RX ADMIN — FAMOTIDINE 20 MG: 20 TABLET ORAL at 08:18

## 2019-01-15 ASSESSMENT — MIFFLIN-ST. JEOR: SCORE: 1499.41

## 2019-01-15 NOTE — PLAN OF CARE
Alert and oriented x 4. Denied having pain and discomfort. LVAD dressing changed by the Coordinator. It was CDI. Good appetite and fluid intake. B/G was 160 dinner time and 169 at bedtime. LVAD number was WDL. PICC line dressing intact and patent. IV heparin is running at 1500u/hr. Old tube site BELLA. Independent in room . Voided good and had BM. MAP was 81 round 1600 and 104 around 2200 gave schedule hydralazin 50mg. Tried to recheck it pt was sleep around 2230.

## 2019-01-15 NOTE — PLAN OF CARE
Planning on discharge to [facility] tomorrow. Doing very well and independent with LVAD cares, mobility with walker, eating, personal cars. Minimal assist with dressing. No complaints. Pleasant.

## 2019-01-15 NOTE — PROGRESS NOTES
All VAD education is complete.   Both patient and brother, Romeo, sons Salo and Jose M, and friend Ryle verbalized understanding of and/or correctly demonstrated the ability to:   -Switch power sources  -Change controller. They both demonstrate controller change properly and verbalized understanding that patient should only change controller after discussion with VAD Coordinator and in the presence of a trained caregiver whenever possible.   -Identify controller, AC/DC power sources, and Battery charger function, alarms, and alarms management.   -Perform Self test on controller   -State VAD precautions and warnings (including but not limited to: travel bags within arms reach at all time, using AC power while sleeping, avoid total immersion in water, boating, MRIs, metal detectors, contact sports, vacuuming or activity that might create static electricity).   -Identify an emergency and state the correct action in the event of an emergency.   -Recognize situations that require paging VAD coordinator and demonstrate proper paging technique.   -Determine procedures that necessitate prophylactic antibiotics.   -Warfarin is managed by [facility] anticoagulation clinic. Pt understands that (s)he should never stop or change coumadin dose unless directed to do so by either VAD team or [facility] anticoagulation.  -Verbalized understanding of VAD parameters, normal limits, and actions required when outside of range.    -Romeo, Jose M, and Ryle demonstrated removing the old dressing, cleaning the exit site, and applying new dressing using sterile technique. Understands need for DL immobilization and signs/symptoms of infection. (Salo will return next week to finish dressing change education)  -Both patient and RomeoSalo Kurtis and Ryle passed written test.  -Patient confirms having working  3-pronged [employer] outlets at home.  -Critical life-sustaining medical equipment letter faxed to [employer].  -VAD information packet faxed to  pt's identified EMS service, primary care provider, and local cardiologist (if applicable).  -Referral sent to [facility] medication monitoring clinic for warfaring management and dosing. Pt will have labs drawn at [facility] as outpatient  -Driveline exit site supplies will be ordered from [employer] [employer] [facility]  Follow-up appointments scheduled on 2/11 with CVTS and on 1/23 with Meredith Garcia.            No

## 2019-01-15 NOTE — PLAN OF CARE
Physical Therapy Discharge Summary    Reason for therapy discharge:    Discharged to home with outpatient therapy.    Progress towards therapy goal(s). See goals on Care Plan in [facility] electronic health record for goal details.  Goals met    Therapy recommendation(s):    Continued therapy is recommended.  Rationale/Recommendations:  Patient to discharge to [facility] in order to remain close to LVAD team. Recommend outpatient cardiac rehab for continued progression of endurance/stamina.

## 2019-01-15 NOTE — DISCHARGE SUMMARY
[facility]  Hospitalist Discharge Summary       Date of Admission:  1/11/2019  Date of Discharge:  1/16/2019  Discharging Provider: Karolina Christianson MD      Discharge Diagnoses   S/P HeartMate III LVAD placement on 12/31 for H/O Ischemic cardiomyopathy due to CAD  Paroxysmal Atrial Fibrillation  Chronic Kidney Disease  DM Type 2  Chronic anemia     Follow-ups Needed After Discharge    Follow up with LVAD clinic on 1/23 and 1/30   INR check \depending on INR      Hospital Course   Mariusz Sharp is a 58 year old male with a history of ICM, CAD (s/p STEMI with ALYSSA to LAD 6/27/18), monomorphic VT, LV thrombus, CKD Stage III, paroxysmal AF, DM Type II who was admitted for placement of LVAD. Now s/p HM3 LVAD 12/31. Concern regarding low flow states prompted keeping the patient intubated on 1/1 and on 1/2 following a SELVIN, the patient was able to be extubated. Elevated MAPs required initiating afterload reduction on 1/3 and increased frequency 1/8. Patient was finally transferred to TCU on 1/11      HeartMate III LVAD placed 12/31  - ICM NYHA class IV stage D. LVEF <20%, mild RV dysfunction (improved on dobutamine gtt). S/P HeartMate III on 12/31  - No arrhythmia, HD stable.  Low flows likely related to increased afterload.  - ASA 81 mg, Crestor, digoxin 125 mcg, Amio 200 mg daily, hydralazine 50 mg TID.    - Patient was briefly on heparin infusion for low INR, which was stopped on 1/15  - lasix  Was held in the setting of worsening renal function with suspected volume depletion but now restarted at 20 mg daily   -Patient has intermittent  high PIs and MAPs. But he is sensitive to medications. Cardiology team is aware .They have recently started on lasix and  Increased hydralazine.  Patient has close follow up with LVAD clinic      Ischemic cardiomyopathy, CAD  - ALYSSA to LAD 6/27/2018  - was not on ACE-I due to CKD  - holding BB after LVAD  - continue rosuvastatin  - was on plavix, holding  now after surgery  - Re-evaluate medications in follow up appointment with Cardiology      Paroxysmal Afib  - Currently in sinus rhythm  - Holding BB after LVAD, on dig  - Heparin drip, warfarin goal INR 2-3     CKD:   Stable. Creatinine at t baseline ( 1.51)     Chronic Anemia: Hgb at 10.0 , Plts WNL     Diabetes Mellitus Type 2 :  Lantus 20 U q PM, prandial dosing 1 U per 10 grams Carb, high corrective sliding scale.               Consultations This Hospital Stay   PHARMACY TO DOSE WARFARIN  NUTRITION SERVICES ADULT IP CONSULT    Code Status   Full Code    Time Spent on this Encounter   IKarolina, personally saw the patient today and spent greater than 30 minutes discharging this patient.       Karolina Christianson MD  [facility] ([facility])  ______________________________________________________________________    Physical Exam   Vital Signs: Temp: 95.5  F (35.3  C) Temp src: Axillary BP: 94/73 Pulse: 82 Heart Rate: 104 Resp: 16 SpO2: 97 % O2 Device: None (Room air)    Weight: 167 lbs 3.2 oz  General Appearance:  Awake, alert and orientated   Respiratory: Clear breath sounds bilaterally.   Cardiovascular: Hum of LVAD machine heard   GI: Soft, non tender. Normal bowel sounds          Primary Care Physician   Like He    Discharge Disposition   Discharged to home  Condition at discharge: Good    Significant Results and Procedures   No significant procedures done while in TCU    Discharge Orders     Discharge Medications   Current Discharge Medication List      CONTINUE these medications which have CHANGED    Details   acetaminophen (TYLENOL) 325 MG tablet Take 2 tablets (650 mg) by mouth every 6 hours as needed for pain  Qty: 1 Bottle, Refills: 0    Associated Diagnoses: LVAD (left ventricular assist device) present (H)      amiodarone (PACERONE/CODARONE) 200 MG tablet Take 1 tablet (200 mg) by mouth daily  Qty: 30 tablet, Refills: 0    Associated Diagnoses: Acute systolic  heart failure (H)      aspirin (ASA) 81 MG chewable tablet Take 1 tablet (81 mg) by mouth daily  Qty: 30 tablet, Refills: 0    Associated Diagnoses: LVAD (left ventricular assist device) present (H)      digoxin (LANOXIN) 125 MCG tablet Take 1 tablet (125 mcg) by mouth daily  Qty: 30 tablet, Refills: 0    Associated Diagnoses: Acute systolic heart failure (H)      famotidine (PEPCID) 20 MG tablet Take 1 tablet (20 mg) by mouth 2 times daily  Qty: 60 tablet, Refills: 0    Associated Diagnoses: Acute on chronic systolic heart failure (H)      furosemide (LASIX) 20 MG tablet Take 1 tablet (20 mg) by mouth daily  Qty: 30 tablet, Refills: 0    Associated Diagnoses: Chronic systolic heart failure (H)      hydrALAZINE (APRESOLINE) 50 MG tablet Take 1 tablet (50 mg) by mouth 3 times daily  Qty: 90 tablet, Refills: 0    Associated Diagnoses: LVAD (left ventricular assist device) present (H)      !! insulin aspart (NOVOLOG PEN) 100 UNIT/ML pen Inject 1-10 Units Subcutaneous 3 times daily (before meals) Correction Scale - HIGH INSULIN RESISTANCE DOSING     -164 =1 units.   -189 =2.   -214 =3.   -239 =4.   -264 =5.   -289 =6.   -314 =7.   -339 =8.   -364 =9.  BG greater than or equal to 365 give 10 units  To be given with meal insulin, based on pre-meal BG  Qty: 9 mL, Refills: 0    Associated Diagnoses: Acute on chronic systolic heart failure (H)      !! insulin aspart (NOVOLOG PEN) 100 UNIT/ML pen Inject 1-7 Units Subcutaneous At Bedtime HIGH INSULIN RESISTANCE DOSING    Bedtime  For  - 224 give 1 units.   For  - 249 give 2 units.   For  - 274 give 3 units.   For  - 299 give 4 units.   For  - 324 give 5 units.   For  - 349 give 6 units.   For BG greater than or equal to 350 give 7 units.  Qty: 2.1 mL, Refills: 0    Associated Diagnoses: Acute on chronic systolic heart failure (H)      !! insulin aspart (NOVOLOG PEN) 100 UNIT/ML pen  Prandial Dosin units per 10 grams of carbohydrate each Meal or Snack.  Only chart total amount of units given.  Do not give if pre-prandial glucose is less than 60 mg/dL. If given at mealtime, administer within 30 minutes of start of meal.    Associated Diagnoses: Acute on chronic systolic heart failure (H)      insulin glargine (LANTUS SOLOSTAR PEN) 100 UNIT/ML pen Inject 20 Units Subcutaneous At Bedtime  Qty: 6 mL, Refills: 0    Associated Diagnoses: Acute on chronic systolic heart failure (H)      nitroGLYcerin (NITROSTAT) 0.4 MG sublingual tablet Place 1 tablet (0.4 mg) under the tongue every 5 minutes as needed for chest pain For chest pain place 1 tablet under the tongue every 5 minutes for 3 doses. If symptoms persist 5 minutes after 1st dose call 911.  Qty: 10 tablet, Refills: 0    Associated Diagnoses: Acute on chronic systolic heart failure (H)      polyethylene glycol (MIRALAX/GLYCOLAX) packet Take 17 g by mouth daily as needed for constipation  Qty: 14 packet, Refills: 0    Associated Diagnoses: LVAD (left ventricular assist device) present (H)      rosuvastatin (CRESTOR) 20 MG tablet Take 1 tablet (20 mg) by mouth At Bedtime  Qty: 30 tablet, Refills: 0    Associated Diagnoses: Acute systolic heart failure (H)      senna-docusate (SENOKOT-S/PERICOLACE) 8.6-50 MG tablet Take 1 tablet by mouth daily as needed for constipation hold for loose stools  Qty: 60 tablet, Refills: 0    Associated Diagnoses: LVAD (left ventricular assist device) present (H)      warfarin (COUMADIN) 2.5 MG tablet To take dose as directed by your INR clinic  Qty: 200 tablet, Refills: 1    Associated Diagnoses: Acute on chronic systolic heart failure (H)       !! - Potential duplicate medications found. Please discuss with provider.      STOP taking these medications       heparin  drip 25,000 units in 0.45% NaCl 250 mL (see additional administration details for dose) Comments:   Reason for Stopping:         melatonin 1 MG TABS  tablet Comments:   Reason for Stopping:         methocarbamol (ROBAXIN) 750 MG tablet Comments:   Reason for Stopping:         oxyCODONE (ROXICODONE) 5 MG tablet Comments:   Reason for Stopping:         Warfarin Therapy Reminder Comments:   Reason for Stopping:             Allergies   No Known Allergies

## 2019-01-15 NOTE — PROGRESS NOTES
01/15/19 1400   General Information   Patient Profile Review See Profile for full history and prior level of function   Daily Contact with Relatives or Friends Phone call;Visit   Community Involvement   Community Involvement Disabled  (was recently a  for a school)   Drives Yes   Spiritual Practice [facility] ? Yes   Outings Dinner;Shopping;Travel;Movies   Hobbies/Interests   Cards Cribbage;Other (see comments)  (Rummy)   Games Yahtzee   Word Puzzles Crossword   Music   Music Preferences Rock;Popular   Listens to Recorded Music Yes   Brought Own Equipment Yes  (has headphones and uses his phone)   Media   Computer (has lap top here)   TV / Movies TV   Sports / Physical Activities   Outdoor Activities Fishing;Boating   Sports/Exercise Bike;Walks;Other (comments)  (hiking)   Walking likes to go on hikes   Sports Fan Baseball;Football   Impression   Open to Socializing with Others Independent   Barriers to Leisure No barriers / independent   Discharge Needs / Referrals None   Patient, family and / or staff in agreement with Plan of Care Yes   Treatment Plan   Structured Groups Social performances   Type of Intervention Independent with activity;1:1 intervention   Equipment and Supplies While on Unit None needed   Assessment REC - assessment completed. Patient is independent and up in his room and in hallways - social with others on unit - will invite to groups as able and 1:1 visits as able

## 2019-01-15 NOTE — PLAN OF CARE
RN: Pt has no c/o pain or discomfort this shift. Heparin infusing at 1500u/hr via PICC overnight and hep10A level check this am but no result as of yet. No s/s of bleeding noted. Will continue to monitor. LVAD numbers WNL- no alarm noted. MAP this am 81.7mmHg, asymprtomatic. Scheduled Hydralazine 50 mg tab given. Continue lisa plan of care.

## 2019-01-15 NOTE — PLAN OF CARE
Occupational Therapy Discharge Summary    Reason for therapy discharge:    Discharged to local housing; [facility].     Progress towards therapy goal(s). See goals on Care Plan in [facility] electronic health record for goal details.  Pt. Indep. with BADLs and amb. with iv pole. Pt. aware of and maintains post op precautions during therapy sessions.    Therapy recommendation(s):    Continued therapy is recommended.  Rationale/Recommendations:  Continue with IP CR phase II to increase activity yeni./strength to max. Safety/indep. With ADLS/IADLS/mobility in home/community setting.

## 2019-01-16 ENCOUNTER — APPOINTMENT (OUTPATIENT)
Dept: LAB | Facility: CLINIC | Age: 59
End: 2019-01-16
Attending: HOSPITALIST
Payer: COMMERCIAL

## 2019-01-16 ENCOUNTER — ANTICOAGULATION THERAPY VISIT (OUTPATIENT)
Dept: ANTICOAGULATION | Facility: CLINIC | Age: 59
End: 2019-01-16

## 2019-01-16 ENCOUNTER — CARE COORDINATION (OUTPATIENT)
Dept: CARDIOLOGY | Facility: CLINIC | Age: 59
End: 2019-01-16

## 2019-01-16 VITALS
DIASTOLIC BLOOD PRESSURE: 69 MMHG | HEART RATE: 79 BPM | SYSTOLIC BLOOD PRESSURE: 82 MMHG | HEIGHT: 64 IN | BODY MASS INDEX: 28.31 KG/M2 | OXYGEN SATURATION: 97 % | RESPIRATION RATE: 16 BRPM | WEIGHT: 165.8 LBS | TEMPERATURE: 97.9 F

## 2019-01-16 DIAGNOSIS — I50.23 ACUTE ON CHRONIC SYSTOLIC HEART FAILURE (H): ICD-10-CM

## 2019-01-16 DIAGNOSIS — Z95.811 LVAD (LEFT VENTRICULAR ASSIST DEVICE) PRESENT (H): Primary | ICD-10-CM

## 2019-01-16 DIAGNOSIS — Z79.01 LONG TERM (CURRENT) USE OF ANTICOAGULANTS: ICD-10-CM

## 2019-01-16 LAB
GLUCOSE BLDC GLUCOMTR-MCNC: 132 MG/DL (ref 70–99)
GLUCOSE BLDC GLUCOMTR-MCNC: 137 MG/DL (ref 70–99)
GLUCOSE BLDC GLUCOMTR-MCNC: 146 MG/DL (ref 70–99)
INR PPP: 3 (ref 0.86–1.14)

## 2019-01-16 PROCEDURE — 85610 PROTHROMBIN TIME: CPT | Performed by: HOSPITALIST

## 2019-01-16 PROCEDURE — 00000146 ZZHCL STATISTIC GLUCOSE BY METER IP

## 2019-01-16 PROCEDURE — 25000128 H RX IP 250 OP 636: Performed by: INTERNAL MEDICINE

## 2019-01-16 PROCEDURE — 36592 COLLECT BLOOD FROM PICC: CPT | Performed by: HOSPITALIST

## 2019-01-16 PROCEDURE — 25000132 ZZH RX MED GY IP 250 OP 250 PS 637: Performed by: HOSPITALIST

## 2019-01-16 RX ORDER — WARFARIN SODIUM 2.5 MG/1
TABLET ORAL
Qty: 200 TABLET | Refills: 1 | Status: SHIPPED | OUTPATIENT
Start: 2019-01-16 | End: 2019-02-11

## 2019-01-16 RX ORDER — WARFARIN SODIUM 7.5 MG/1
7.5 TABLET ORAL
Status: DISCONTINUED | OUTPATIENT
Start: 2019-01-16 | End: 2019-01-16 | Stop reason: HOSPADM

## 2019-01-16 RX ADMIN — FAMOTIDINE 20 MG: 20 TABLET ORAL at 08:34

## 2019-01-16 RX ADMIN — FUROSEMIDE 20 MG: 20 TABLET ORAL at 08:34

## 2019-01-16 RX ADMIN — ASPIRIN 81 MG 81 MG: 81 TABLET ORAL at 08:34

## 2019-01-16 RX ADMIN — SODIUM CHLORIDE, PRESERVATIVE FREE 5 ML: 5 INJECTION INTRAVENOUS at 05:44

## 2019-01-16 RX ADMIN — DIGOXIN 125 MCG: 125 TABLET ORAL at 08:33

## 2019-01-16 RX ADMIN — AMIODARONE HYDROCHLORIDE 200 MG: 200 TABLET ORAL at 08:34

## 2019-01-16 RX ADMIN — HYDRALAZINE HYDROCHLORIDE 50 MG: 50 TABLET ORAL at 06:18

## 2019-01-16 ASSESSMENT — MIFFLIN-ST. JEOR: SCORE: 1493.06

## 2019-01-16 NOTE — UTILIZATION REVIEW
Pain Interview    1. Have you had pain or hurting at any time in the last 5 days?  Yes  2. How much of the time have you experienced pain or hurting over the last 5 days? Occasionally  3. Over the past 5 days, has pain made it hard for you to sleep at night?  No  4. Over the past 5 days, have you limited your day-to-day activities because of pain?No  5. Rate pain intensity: Numeric 3    Patient describes a brief, sharp pain in the heart that happens usually daily but goes away very quickly.

## 2019-01-16 NOTE — PLAN OF CARE
Pt with LVAD, no alarms this shift, number WNL. Denied pain. PICC line removed intact at 45 cm. Pressure drsg applied. Discharge paperwork covered with pt, his brother took a load of belongings to [place] and is waiting to come get patient as soon as discharge meds are delivered to floor.

## 2019-01-16 NOTE — PROGRESS NOTES
Met with patient to update the discharge planning discussion. He will discharge to [facility] today with his brother. He will do outpatient Cardiac Rehab. He will go to [facility] [place]R checks, next one due tomorrow, patient aware. Updated [facility]R [facility] and LVAD Coordinator on call of discharge plans.

## 2019-01-16 NOTE — DISCHARGE INSTRUCTIONS
LVAD Coordinator GERMAN Shahid    You will have your INR checks done in lab at the [address] [place] [address], next INR check due 1/17/2019    Sterile daily LVAD driveline site dressing changes as instructed by LVAD team

## 2019-01-16 NOTE — PROGRESS NOTES
Red called to say he had three seconds of low flow alarm while he was having a bowel movement. I asked if he was straining or bearing down. He said it happened right after he was straining and this has happened before. I asked if he was taking stool softeners still since surgery and he should be to avoid this need to strain. He said he was going to  stool softeners on his way to the [facility] after he is discharged from Rehab today. While we were talking his flow was 3.8 and he said he never felt any symptoms of low flow such as dizzyness, shortness of breath, or weakness.

## 2019-01-16 NOTE — PHARMACY-ANTICOAGULATION SERVICE
Clinical Pharmacy- Warfarin Discharge Note  This patient is currently on warfarin for the treatment of LVAD.  INR Goal= 2-3  Expected length of therapy undetermined.    Warfarin PTA Regimen: 4 mg po daily      Anticoagulation Dose History     Recent Dosing and Labs Latest Ref Rng & Units 1/10/2019 1/11/2019 1/12/2019 1/13/2019 1/14/2019 1/15/2019 1/16/2019    DERRELL IMS TEMPLATE - - 12.5 mg 12.5 mg - 12.5 mg - -    Warfarin 7.5 mg - - - - 15 mg - 7.5 mg -    Warfarin 10 mg - 10 mg - - - - - -    INR 0.86 - 1.14 1.34(H) 1.27(H) 1.43(H) 1.59(H) 1.89(H) 2.62(H) 3.00(H)    INR-ISTAT 0.86 - 1.14 - - - - - - -    Chromogenic Factor 10 70 - 130 % - 72 - - - - -          Vitamin K doses administered during the last 7 days: None  FFP administered during the last 7 days: None  Recommend discharging the patient on a warfarin regimen of 7.5 mg with a prescription for warfarin 2.5mg tablets.      The patient should have an INR checked on thursday 1/17/19 .

## 2019-01-16 NOTE — PROGRESS NOTES
Dates of hospitalization: 12/29/18 to 1/11/19 hospitalized;   1/11/19 to 1/16/19 TCU  Reason for hospitalization:   S/P HeartMate III LVAD placement on 12/31 for H/O Ischemic cardiomyopathy due to CAD  Procedures performed: LVAD placement  Vitamin K or FFP administered? no  INR Goal Range Confirmed to be: 2 - 3  Inpatient warfarin doses added to calendar? Yes  Medication changes at discharge: start tylenol, amiodarone 200 mg daily, ASA 81 mg daily, digoxin, Pepcid, lasix, hydralazine, insulin, nitroglycerin, crestor  Warfarin dosing after DC: unsure, please check discharge summary when available  Patient discharged on Lovenox? no  Next INR date: 1/17/19  Where is the patient discharging to? (home, TCU, staying locally, etc.): [facility]  Will patient have home care? No    Discharging from TCU on 1/16/19, to have INR checked 1/17/19.  He will be staying at [facility] for several weeks.    Patient had LVAD placed on:   12/31/19  Patient's current Aspirin dose: 81 mg daily.  Jovana Ruelas RN    Addendum 1/17/19 Pt discharged on 7.5mg dose on 1/16 (2.5mg tablets). Chasity Gonzalez RN

## 2019-01-17 ENCOUNTER — APPOINTMENT (OUTPATIENT)
Dept: CT IMAGING | Facility: CLINIC | Age: 59
End: 2019-01-17
Payer: COMMERCIAL

## 2019-01-17 ENCOUNTER — HOSPITAL ENCOUNTER (EMERGENCY)
Facility: CLINIC | Age: 59
Discharge: HOME | End: 2019-01-17
Attending: EMERGENCY MEDICINE | Admitting: EMERGENCY MEDICINE
Payer: COMMERCIAL

## 2019-01-17 ENCOUNTER — CARE COORDINATION (OUTPATIENT)
Dept: CARDIOLOGY | Facility: CLINIC | Age: 59
End: 2019-01-17

## 2019-01-17 ENCOUNTER — ANTICOAGULATION THERAPY VISIT (OUTPATIENT)
Dept: ANTICOAGULATION | Facility: CLINIC | Age: 59
End: 2019-01-17

## 2019-01-17 VITALS
OXYGEN SATURATION: 97 % | WEIGHT: 164.8 LBS | DIASTOLIC BLOOD PRESSURE: 84 MMHG | TEMPERATURE: 97.8 F | BODY MASS INDEX: 28.13 KG/M2 | RESPIRATION RATE: 18 BRPM | SYSTOLIC BLOOD PRESSURE: 104 MMHG | HEIGHT: 64 IN

## 2019-01-17 DIAGNOSIS — Z95.811 LVAD (LEFT VENTRICULAR ASSIST DEVICE) PRESENT (H): ICD-10-CM
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
## 2019-01-17 DIAGNOSIS — S09.90XA CLOSED HEAD INJURY, INITIAL ENCOUNTER: ICD-10-CM

## 2019-01-17 DIAGNOSIS — I50.23 ACUTE ON CHRONIC SYSTOLIC HEART FAILURE (H): ICD-10-CM

## 2019-01-17 LAB
ANION GAP SERPL CALCULATED.3IONS-SCNC: 10 MMOL/L (ref 3–14)
BUN SERPL-MCNC: 35 MG/DL (ref 7–30)
CALCIUM SERPL-MCNC: 9.2 MG/DL (ref 8.5–10.1)
CHLORIDE SERPL-SCNC: 100 MMOL/L (ref 94–109)
CO2 SERPL-SCNC: 25 MMOL/L (ref 20–32)
CREAT SERPL-MCNC: 1.8 MG/DL (ref 0.66–1.25)
ERYTHROCYTE [DISTWIDTH] IN BLOOD BY AUTOMATED COUNT: 15.8 % (ref 10–15)
GFR SERPL CREATININE-BSD FRML MDRD: 41 ML/MIN/{1.73_M2}
GLUCOSE SERPL-MCNC: 201 MG/DL (ref 70–99)
HCT VFR BLD AUTO: 34.4 % (ref 40–53)
HGB BLD-MCNC: 10.4 G/DL (ref 13.3–17.7)
INR PPP: 2.5 (ref 0.86–1.14)
MCH RBC QN AUTO: 26.9 PG (ref 26.5–33)
MCHC RBC AUTO-ENTMCNC: 30.2 G/DL (ref 31.5–36.5)
MCV RBC AUTO: 89 FL (ref 78–100)
PLATELET # BLD AUTO: 459 10E9/L (ref 150–450)
POTASSIUM SERPL-SCNC: 4.6 MMOL/L (ref 3.4–5.3)
RBC # BLD AUTO: 3.86 10E12/L (ref 4.4–5.9)
SODIUM SERPL-SCNC: 134 MMOL/L (ref 133–144)
WBC # BLD AUTO: 8.9 10E9/L (ref 4–11)

## 2019-01-17 PROCEDURE — 70450 CT HEAD/BRAIN W/O DYE: CPT

## 2019-01-17 PROCEDURE — 99284 EMERGENCY DEPT VISIT MOD MDM: CPT | Mod: 25 | Performed by: EMERGENCY MEDICINE

## 2019-01-17 PROCEDURE — 99284 EMERGENCY DEPT VISIT MOD MDM: CPT | Mod: Z6 | Performed by: EMERGENCY MEDICINE

## 2019-01-17 SDOH — HEALTH STABILITY: MENTAL HEALTH: HOW OFTEN DO YOU HAVE A DRINK CONTAINING ALCOHOL?: NEVER

## 2019-01-17 ASSESSMENT — ENCOUNTER SYMPTOMS
HEADACHES: 0
WEAKNESS: 0
WOUND: 1
NECK PAIN: 0
NUMBNESS: 0

## 2019-01-17 ASSESSMENT — MIFFLIN-ST. JEOR: SCORE: 1488.53

## 2019-01-17 NOTE — PROGRESS NOTES
ANTICOAGULATION FOLLOW-UP CLINIC VISIT    Patient Name:  Mariusz Sharp  Date:  2019  Contact Type:  Telephone    SUBJECTIVE:     Patient Findings     Positives:   No Problem Findings    Comments:   Spoke to patient.  Rationale for Friday draw, is patient discharged from rehab on .  Patient verbalized understanding.           OBJECTIVE    INR   Date Value Ref Range Status   2019 2.50 (H) 0.86 - 1.14 Final     Chromogenic Factor 10   Date Value Ref Range Status   2019 72 70 - 130 % Final     Comment:     Therapeutic Range:  A Chromogenic Factor 10 level of approximately 20-40%   inversely correlates with an INR of 2-3 for patients receiving Warfarin.   Chromogenic Factor 10 levels below 20% indicate an INR greater than 3 and   levels above 40% indicate an INR less than 2.         ASSESSMENT / PLAN  INR assessment THER    Recheck INR In: 1 DAY    INR Location Clinic      Anticoagulation Summary  As of 2019    INR goal:   2.0-3.0   TTR:   --   INR used for dosin.50 (2019)   Warfarin maintenance plan:   No maintenance plan   Full warfarin instructions:   : 7.5 mg   Plan last modified:   Chasity Gonzalez RN (2019)   Next INR check:   2019   Priority:   INR   Target end date:   Indefinite    Indications    Heart failure (H) [I50.9]  LVAD (left ventricular assist device) present (H) [Z95.811]             Anticoagulation Episode Summary     INR check location:       Preferred lab:       Send INR reminders to:   [facility]    Comments:   LVAD implanted  ASA 81mg Daily  19 Staying at [facility]        Anticoagulation Care Providers     Provider Role Specialty Phone number    Jenni Ortiz MD [facility] Cardiology [phone]            See the Encounter Report to view Anticoagulation Flowsheet and Dosing Calendar (Go to Encounters tab in chart review, and find the Anticoagulation Therapy Visit)    Spoke to Red.  Introduced patient to the coumadin  clinic.  Answered questions.  Sent HIPPA packet to [facility].    Patient had LVAD placed on:   Heartmate 3 placed on 2/31/18  Patient's current Aspirin dose: 81mg  LVAD Protocol followed:  Yes.   If Not Followed Explanation:  Sandor Zaman, RN

## 2019-01-17 NOTE — ED AVS SNAPSHOT
[facility], [place], Emergency Department  [address] [place] [id number]  Phone:  [phone]                                    Mariusz Sharp   MRN: [id number]    Department:  [facility], Emergency Department   Date of Visit:  1/17/2019           After Visit Summary Signature Page    I have received my discharge instructions, and my questions have been answered. I have discussed any challenges I see with this plan with the nurse or doctor.    ..........................................................................................................................................  Patient/Patient Representative Signature      ..........................................................................................................................................  Patient Representative Print Name and Relationship to Patient    ..................................................               ................................................  Date                                   Time    ..........................................................................................................................................  Reviewed by Signature/Title    ...................................................              ..............................................  Date                                               Time          22EPIC Rev 08/18

## 2019-01-17 NOTE — PROGRESS NOTES
Mariusz called to say he tripped on a sidewalk and hit his head pretty hard. He was walking in to the ED when he called the VAD coordinator on call. He is coming to the ED for a head CT. His INR is 2.50 today.

## 2019-01-17 NOTE — ED TRIAGE NOTES
Pt. Presents to ED with c/o R sided head injury and R thigh pain after falling while walking on uneven sidewalk. Pt. Denies LOC, no hematoma or laceration present. Denies vision changes, confusion, all neuro symptoms. Pt. Able to bear weight on R leg but c/o discomfort. Pt. Has small cut on R pinky knuckle, BELLA, bleeding controlled. Pt. Has LVAD, no complaints regarding that. AVSS on RA. LVAD is a Heart mate 3. Pt. Is on coumadin and will take his dose tonight. Reports his last INR as 2.5.

## 2019-01-17 NOTE — PROGRESS NOTES
Called patient/caregiver to check in 24hr post discharge. Pt reports VAD parameters WNL and weight stable (164.8lb). Reviewed medications and answered any questions. Patient reports sleeping fair and no anxiety since being home with LVAD. Patient is able to move around the house and care for him/herself independently.     Discussed specific new problems/stressors since being discharged from the hospital: pt had another very brief low flow alarm this am while straining to have BM - denied any dizziness or lightheadedness. Also noted that creatinine is elevated to 1.8 from 1.5 yesterday. Pt is taking lasix 20mg daily. Will discuss with Dr. Ortiz regarding med change vs. Monitoring. Empathized with patient and reviewed coping strategies: enlisting support from friends and love ones, attending patient and caregiver support groups, reviewing LVAD educational materials to reinforce knowledge, and talking about concerns with family/care providers/trusted others. Encouraged pt to page VAD Coordinator with any issues or questions. Pt verbalizes understanding.

## 2019-01-18 ENCOUNTER — ANTICOAGULATION THERAPY VISIT (OUTPATIENT)
Dept: ANTICOAGULATION | Facility: CLINIC | Age: 59
End: 2019-01-18

## 2019-01-18 ENCOUNTER — CARE COORDINATION (OUTPATIENT)
Dept: CARDIOLOGY | Facility: CLINIC | Age: 59
End: 2019-01-18

## 2019-01-18 DIAGNOSIS — Z79.01 LONG TERM (CURRENT) USE OF ANTICOAGULANTS: ICD-10-CM

## 2019-01-18 DIAGNOSIS — I50.23 ACUTE ON CHRONIC SYSTOLIC HEART FAILURE (H): ICD-10-CM

## 2019-01-18 DIAGNOSIS — Z95.811 LVAD (LEFT VENTRICULAR ASSIST DEVICE) PRESENT (H): ICD-10-CM

## 2019-01-18 LAB — INR PPP: 2.43 (ref 0.86–1.14)

## 2019-01-18 NOTE — ED PROVIDER NOTES
[facility] EMERGENCY DEPARTMENT ([facility])  1/17/19   History     Chief Complaint   Patient presents with     Head Injury     The history is provided by the patient and medical records.     Mariusz Sharp is a 58 year old male with a medical history significant for ICM secondary to CAD (STEMI with stent placement) s/p LVAD (12/31/2018) on Coumadin, proximal atrial fibrillation, type 2 diabetes mellitus and CKD stage III who presents to the Emergency Department for evaluation after a head injury.  The patient was walking outside this evening at approximately 5 PM when he stepped on uneven pavement.  Patient fell forward and struck the right side of his head against the pavement.  The patient was able to get up without any assistance and has been able to walk since the fall.  He denies any loss of consciousness.  He denies wearing any protective gear at the time of the fall.  The patient here in the Emergency Department denies any headache.  However, due to the fact that he is on Coumadin, he wanted to be evaluated.  Patient's INR was last checked this morning and was 2.5.  The patient here is complaining of some right, lateral thigh pain since the fall, but he has been able to walk on the leg without difficulty.  He is also noted to have an abrasion to his right pinky finger.  He denies any neck pain, weakness or numbness.    I have reviewed the Medications, Allergies, Past Medical and Surgical History, and Social History in the [employer] system.    Past Medical History:   Diagnosis Date     Acute kidney injury (H)      CKD (chronic kidney disease)      Coronary artery disease      Diabetes mellitus (H)      HTN (hypertension)      Hyperlipidemia      Ischemic cardiomyopathy      Paroxysmal atrial flutter (H)      Systolic heart failure (H)      Ventricular tachycardia (H)        Past Surgical History:   Procedure Laterality Date     COLONOSCOPY N/A 12/7/2018    Procedure: COLONOSCOPY;  Surgeon: Krish Mercado  "MD;  Location: [facility] OR      PRQ TRLUML CORONARY ANGIOPLASTY ADDL BRANCH      PCI and ALYSSA to ostial LAD on 6/27/18     IMPLANT AUTOMATIC IMPLANTABLE CARDIOVERTER DEFIBRILLATOR       INSERT VENTRICULAR ASSIST DEVICE LEFT (HEARTMATE II) N/A 12/31/2018    Procedure: Median Sternotomy, On Cardiopulmonary Bypass Pump, Insertion of Left Ventricular Assist Device (Heartmate III);  Surgeon: Kamaljit Baker MD;  Location: [facility] OR       History reviewed. No pertinent family history.    Social History     Tobacco Use     Smoking status: Never Smoker     Smokeless tobacco: Never Used   Substance Use Topics     Alcohol use: No     Frequency: Never       No current facility-administered medications for this encounter.      Current Outpatient Medications   Medication     acetaminophen (TYLENOL) 325 MG tablet     amiodarone (PACERONE/CODARONE) 200 MG tablet     aspirin (ASA) 81 MG chewable tablet     digoxin (LANOXIN) 125 MCG tablet     famotidine (PEPCID) 20 MG tablet     furosemide (LASIX) 20 MG tablet     hydrALAZINE (APRESOLINE) 50 MG tablet     insulin aspart (NOVOLOG PEN) 100 UNIT/ML pen     insulin aspart (NOVOLOG PEN) 100 UNIT/ML pen     insulin aspart (NOVOLOG PEN) 100 UNIT/ML pen     insulin glargine (LANTUS SOLOSTAR PEN) 100 UNIT/ML pen     nitroGLYcerin (NITROSTAT) 0.4 MG sublingual tablet     polyethylene glycol (MIRALAX/GLYCOLAX) packet     rosuvastatin (CRESTOR) 20 MG tablet     senna-docusate (SENOKOT-S/PERICOLACE) 8.6-50 MG tablet     warfarin (COUMADIN) 2.5 MG tablet      No Known Allergies      Review of Systems   Musculoskeletal: Negative for neck pain.        Positive for right, lateral thigh pain   Skin: Positive for wound (abrasion to right pinky finger).   Neurological: Negative for weakness, numbness and headaches.   All other systems reviewed and are negative.      Physical Exam   BP: 121/85  Heart Rate: 80  Temp: 97.8  F (36.6  C)  Resp: 16  Height: 162.6 cm (5' 4\")  Weight: 74.8 kg (164 lb 12.8 oz)  SpO2: 98 " %      Physical Exam   Constitutional: He is oriented to person, place, and time. He appears well-developed and well-nourished.   HENT:   Head: Normocephalic and atraumatic.   No bony tenderness.  No contusions or lacerations palpable.   Neck: Normal range of motion. Neck supple.   No neck tenderness to palpation.  Normal range of motion of neck.   Cardiovascular: Normal rate.   Well healing midline mediastinum incision and trocar sites in upper right abdomen; mechanical LVAD sound   Pulmonary/Chest: Effort normal. No respiratory distress. He has no wheezes.   Abdominal: Soft. He exhibits no distension. There is no tenderness. There is no rebound.   Musculoskeletal:   Tenderness right upper leg.  Soft compartments.  Normal gait.  No abrasions or contusions visible.   Neurological: He is alert and oriented to person, place, and time.   Skin: Skin is warm.   Superficial abrasion right knuckles    Psychiatric: He has a normal mood and affect. His behavior is normal. Thought content normal.       ED Course   6:22 PM  The patient was seen and examined by Nani Banuelos MD in [facility]        Procedures             Critical Care time:  none         Results for orders placed or performed during the hospital encounter of 01/17/19   CT Head w/o Contrast    Narrative    CT HEAD W/O CONTRAST 1/17/2019 7:35 PM    History: Head trauma, minor, pt on anticoagulation    Comparison: CT 7/22/2018.    Technique: Using multidetector thin collimation helical acquisition  technique, axial, coronal and sagittal CT images from the skull base  to the vertex were obtained without intravenous contrast.     Findings:    No intracranial hemorrhage, mass effect, or midline shift. The  ventricles are proportionate to the cerebral sulci. The gray to white  matter differentiation of the cerebral hemispheres is preserved. The  basal cisterns are patent.    The visualized paranasal sinuses are clear. The mastoid air cells are  clear.       Impression     Impression: No acute intracranial pathology.    I have personally reviewed the examination and initial interpretation  and I agree with the findings.    ANNABELLA MAE MD     Medications - No data to display         Labs Ordered and Resulted from Time of ED Arrival Up to the Time of Departure from the ED - No data to display     Results for orders placed or performed in visit on 01/17/19 (from the past 24 hour(s))   INR   Result Value Ref Range    INR 2.50 (H) 0.86 - 1.14   Basic metabolic panel   Result Value Ref Range    Sodium 134 133 - 144 mmol/L    Potassium 4.6 3.4 - 5.3 mmol/L    Chloride 100 94 - 109 mmol/L    Carbon Dioxide 25 20 - 32 mmol/L    Anion Gap 10 3 - 14 mmol/L    Glucose 201 (H) 70 - 99 mg/dL    Urea Nitrogen 35 (H) 7 - 30 mg/dL    Creatinine 1.80 (H) 0.66 - 1.25 mg/dL    GFR Estimate 41 (L) >60 mL/min/[1.73_m2]    GFR Estimate If Black 48 (L) >60 mL/min/[1.73_m2]    Calcium 9.2 8.5 - 10.1 mg/dL   CBC with platelets   Result Value Ref Range    WBC 8.9 4.0 - 11.0 10e9/L    RBC Count 3.86 (L) 4.4 - 5.9 10e12/L    Hemoglobin 10.4 (L) 13.3 - 17.7 g/dL    Hematocrit 34.4 (L) 40.0 - 53.0 %    MCV 89 78 - 100 fl    MCH 26.9 26.5 - 33.0 pg    MCHC 30.2 (L) 31.5 - 36.5 g/dL    RDW 15.8 (H) 10.0 - 15.0 %    Platelet Count 459 (H) 150 - 450 10e9/L              Assessments & Plan (with Medical Decision Making)   Patient is a 58-year-old male who recently had an elevated placed about 2-1/2 weeks ago who presented to the ER due to a mechanical fall.  Patient is complaining of some mild leg pain but was able to fully ambulate.  He denied headache or any head injury on exam.  Patient however is on Coumadin so we obtain a CT head which was negative.  Patient was watched in the ER for 6 hours after the fall.  Patient still denies any headache.  Did discuss the case with Dr. Suazo who is the cards to staff on-call.  He is familiar with the patient.  He said from an LVAD perspective the did not have any acute  recommendations.  They felt that the CT head was negative he can be discharged home.  They did state that they would have the LVAD coordinator reach out to them tomorrow to make sure he was doing well.  Discussed the plan of care with the patient.  Patient stable for discharge    I have reviewed the nursing notes.    I have reviewed the findings, diagnosis, plan and need for follow up with the patient.       Medication List      There are no discharge medications for this visit.         Final diagnoses:   Fall, initial encounter   LVAD (left ventricular assist device) present (H)   Closed head injury, initial encounter     IEdgar, am serving as a trained medical scribe to document services personally performed by Nani Banuelos MD, based on the provider's statements to me.   INani MD, was physically present and have reviewed and verified the accuracy of this note documented by Edgar Bardales.    1/17/2019   [facility], [place], EMERGENCY DEPARTMENT     Nani Banuelos MD  01/18/19 0012

## 2019-01-18 NOTE — PROGRESS NOTES
Called pt to check-in following ED visit yesterday. Pt states he is feeling just fine, headache or vision changes. Pt denies any lightheadedness or dizziness. Reviewed pt's labs and med list with Dr. Ortiz. Noted rising creatinine over the last few days. MD requested monitoring for now and check labs and BP on Monday. Pt verbalized understanding. Will see pt on Monday for labs, bp check, and dressing change education.

## 2019-01-18 NOTE — PROGRESS NOTES
Patient had LVAD placed on:   2019  Patient's current Aspirin dose: 81mg daily  LVAD Protocol followed: yes   ANTICOAGULATION FOLLOW-UP CLINIC VISIT    Patient Name:  Mariusz Sharp  Date:  2019  Contact Type:  Telephone    SUBJECTIVE:     Patient Findings     Positives:   No Problem Findings           OBJECTIVE    INR   Date Value Ref Range Status   2019 2.43 (H) 0.86 - 1.14 Final     Chromogenic Factor 10   Date Value Ref Range Status   2019 72 70 - 130 % Final     Comment:     Therapeutic Range:  A Chromogenic Factor 10 level of approximately 20-40%   inversely correlates with an INR of 2-3 for patients receiving Warfarin.   Chromogenic Factor 10 levels below 20% indicate an INR greater than 3 and   levels above 40% indicate an INR less than 2.         ASSESSMENT / PLAN  INR assessment THER    Recheck INR In: 3 DAYS    INR Location Clinic      Anticoagulation Summary  As of 2019    INR goal:   2.0-3.0   TTR:   --   INR used for dosin.43 (2019)   Warfarin maintenance plan:   No maintenance plan   Full warfarin instructions:   : 10 mg; : 7.5 mg; : 10 mg   Plan last modified:   Chasity Gonzalez RN (2019)   Next INR check:   2019   Priority:   INR   Target end date:   Indefinite    Indications    Heart failure (H) [I50.9]  LVAD (left ventricular assist device) present (H) [Z95.811]             Anticoagulation Episode Summary     INR check location:       Preferred lab:       Send INR reminders to:   [facility]    Comments:   LVAD implanted  ASA 81mg Daily  19 Staying at [facility]        Anticoagulation Care Providers     Provider Role Specialty Phone number    Jenni Ortiz MD Responsible Cardiology [phone]            See the Encounter Report to view Anticoagulation Flowsheet and Dosing Calendar (Go to Encounters tab in chart review, and find the Anticoagulation Therapy Visit)  Spoke with patient.    Nasreen Kathleen,  RN

## 2019-01-18 NOTE — DISCHARGE INSTRUCTIONS
Please take tylenol as needed for pain.     Return to the ER if any other problems/concerns.     Please follow up with your LVAD coordinator.

## 2019-01-20 ENCOUNTER — TELEPHONE (OUTPATIENT)
Dept: CARDIOLOGY | Facility: CLINIC | Age: 59
End: 2019-01-20

## 2019-01-20 NOTE — TELEPHONE ENCOUNTER
LATE DOCUMENTATION:    D:  Pt called 3 times on 1/19 to report that he had a low flow alarm while sitting down in a restaurant.  He denied symptoms of weakness, dizziness, etc..  He also reported that he has had low flow alarms when going to the bathroom.  Current device parameters (MH 3):  Speed 5300, flow 2.7, PI 10.2, power 3.9.  Within a few minutes the values would correct to flow 3.5, PI 7.7 which he reported is more consistent with what he has normally seen.  Most recent INR 2.43.  I:  We tried to see if position would make a difference, and when he sat down he would see a brief increase in flow, but not sustained.  Elevating his feet did not seem to have much impact.  We discussed that since he is not having symptoms and that the readings correct within a few minutes that I would not ask him to come to the ED at this time.  However, if he feels worse or the alarms do not go away quickly, that he should come in.  He agreed to let me know if symptoms developed further.  A:  Low flow alarms with elevated PI, cause uncertain.  P:  Monitor for symptoms and repeat alarms.

## 2019-01-21 ENCOUNTER — ANTICOAGULATION THERAPY VISIT (OUTPATIENT)
Dept: ANTICOAGULATION | Facility: CLINIC | Age: 59
End: 2019-01-21

## 2019-01-21 ENCOUNTER — ALLIED HEALTH/NURSE VISIT (OUTPATIENT)
Dept: CARDIOLOGY | Facility: CLINIC | Age: 59
End: 2019-01-21
Attending: INTERNAL MEDICINE
Payer: COMMERCIAL

## 2019-01-21 VITALS — DIASTOLIC BLOOD PRESSURE: 83 MMHG | HEART RATE: 86 BPM | SYSTOLIC BLOOD PRESSURE: 126 MMHG

## 2019-01-21 DIAGNOSIS — I50.22 CHRONIC SYSTOLIC CONGESTIVE HEART FAILURE (H): ICD-10-CM

## 2019-01-21 DIAGNOSIS — Z95.811 LVAD (LEFT VENTRICULAR ASSIST DEVICE) PRESENT (H): Primary | ICD-10-CM

## 2019-01-21 DIAGNOSIS — Z95.811 LVAD (LEFT VENTRICULAR ASSIST DEVICE) PRESENT (H): ICD-10-CM

## 2019-01-21 DIAGNOSIS — I50.23 ACUTE ON CHRONIC SYSTOLIC HEART FAILURE (H): ICD-10-CM

## 2019-01-21 LAB
ANION GAP SERPL CALCULATED.3IONS-SCNC: 6 MMOL/L (ref 3–14)
BUN SERPL-MCNC: 29 MG/DL (ref 7–30)
CALCIUM SERPL-MCNC: 9.1 MG/DL (ref 8.5–10.1)
CHLORIDE SERPL-SCNC: 105 MMOL/L (ref 94–109)
CO2 SERPL-SCNC: 26 MMOL/L (ref 20–32)
CREAT SERPL-MCNC: 1.51 MG/DL (ref 0.66–1.25)
GFR SERPL CREATININE-BSD FRML MDRD: 51 ML/MIN/{1.73_M2}
GLUCOSE SERPL-MCNC: 133 MG/DL (ref 70–99)
INR PPP: 3.24 (ref 0.86–1.14)
POTASSIUM SERPL-SCNC: 4.2 MMOL/L (ref 3.4–5.3)
SODIUM SERPL-SCNC: 137 MMOL/L (ref 133–144)

## 2019-01-21 PROCEDURE — 36415 COLL VENOUS BLD VENIPUNCTURE: CPT | Performed by: INTERNAL MEDICINE

## 2019-01-21 PROCEDURE — 85610 PROTHROMBIN TIME: CPT | Performed by: INTERNAL MEDICINE

## 2019-01-21 PROCEDURE — 80048 BASIC METABOLIC PNL TOTAL CA: CPT | Performed by: INTERNAL MEDICINE

## 2019-01-21 NOTE — PROGRESS NOTES
ANTICOAGULATION FOLLOW-UP CLINIC VISIT    Patient Name:  Mariusz Sharp  Date:  1/21/2019  Contact Type:  Telephone    SUBJECTIVE:     Patient Findings     Positives:   No Problem Findings           OBJECTIVE    INR   Date Value Ref Range Status   01/21/2019 3.24 (H) 0.86 - 1.14 Final     Chromogenic Factor 10   Date Value Ref Range Status   01/11/2019 72 70 - 130 % Final     Comment:     Therapeutic Range:  A Chromogenic Factor 10 level of approximately 20-40%   inversely correlates with an INR of 2-3 for patients receiving Warfarin.   Chromogenic Factor 10 levels below 20% indicate an INR greater than 3 and   levels above 40% indicate an INR less than 2.         ASSESSMENT / PLAN  INR assessment SUPRA    Recheck INR In: 2 DAYS    INR Location Clinic      Anticoagulation Summary  As of 1/21/2019    INR goal:   2.0-3.0   TTR:   --   INR used for dosing:   3.24! (1/21/2019)   Warfarin maintenance plan:   No maintenance plan   Full warfarin instructions:   1/21: 5 mg; 1/22: 7.5 mg   Plan last modified:   Chasity Gonzalez RN (1/17/2019)   Next INR check:   1/23/2019   Priority:   INR   Target end date:   Indefinite    Indications    Heart failure (H) [I50.9]  LVAD (left ventricular assist device) present (H) [Z95.811]             Anticoagulation Episode Summary     INR check location:       Preferred lab:       Send INR reminders to:   [facility]    Comments:   LVAD implanted 12/31 ASA 81mg Daily Has 2.5mg tablets   1/17/19 Staying at [facility]        Anticoagulation Care Providers     Provider Role Specialty Phone number    Jenni Ortiz MD [facility] Cardiology [phone]            See the Encounter Report to view Anticoagulation Flowsheet and Dosing Calendar (Go to Encounters tab in chart review, and find the Anticoagulation Therapy Visit)    Spoke with patient. Gave them their lab results and new warfarin recommendation.  No changes in health, medication, or diet. No missed doses, no falls. No  signs or symptoms of bleed or clotting.     Chasity Gonzalez, RN

## 2019-01-21 NOTE — NURSING NOTE
Pt presented to clinic today for additional dressing change education for sonSalo. Salo practiced a dressing change on the practice board, and then performed a dressing on pt. Salo has passed the dressing change testing.   DRESSING CHANGE / DRIVELINE ASSESSMENT  Appearance of Driveline site: small amt of serosanguinous drainage, site is reddened, but appears to be from healing surgical wound. Suture remains in place.    Pt had several low flow alarms over the weekend, with the last at 0500 today. Pt also had several PI events, with interrogation back only about 12hrs. PI's ranging from 2.4-9.7. Rare speed drops to 5150. At time of interrogation, parameters as follows:    Flow: 3.4 L/min,    Speed: 5300 RPMs,     PI: 6.6 ,  Power: 3.9 Parker    Discussed findings with Dr. Ortiz. With PI events and low flow alarms, in addition to high blood pressure (MAP 97), Dr. Ortiz gave order to increase Hydralazine to 100mg TID.  Called pt after clinic visit to discuss new order. Pt verbalized understanding. Gave instructions to be cautious with position changes for the next few days while pt is adjusting to higher dose. Will see pt again in clinic on Wednesday with NP, with a speed optimization echo prior.

## 2019-01-22 DIAGNOSIS — Z95.811 LVAD (LEFT VENTRICULAR ASSIST DEVICE) PRESENT (H): Primary | ICD-10-CM

## 2019-01-22 DIAGNOSIS — I50.22 CHRONIC SYSTOLIC CONGESTIVE HEART FAILURE (H): ICD-10-CM

## 2019-01-23 ENCOUNTER — ANTICOAGULATION THERAPY VISIT (OUTPATIENT)
Dept: ANTICOAGULATION | Facility: CLINIC | Age: 59
End: 2019-01-23

## 2019-01-23 ENCOUNTER — HOSPITAL ENCOUNTER (OUTPATIENT)
Dept: CARDIOLOGY | Facility: CLINIC | Age: 59
Discharge: HOME | End: 2019-01-23
Attending: INTERNAL MEDICINE | Admitting: INTERNAL MEDICINE
Payer: COMMERCIAL

## 2019-01-23 ENCOUNTER — OFFICE VISIT (OUTPATIENT)
Dept: CARDIOLOGY | Facility: CLINIC | Age: 59
End: 2019-01-23
Attending: INTERNAL MEDICINE
Payer: COMMERCIAL

## 2019-01-23 VITALS
DIASTOLIC BLOOD PRESSURE: 76 MMHG | SYSTOLIC BLOOD PRESSURE: 102 MMHG | OXYGEN SATURATION: 100 % | BODY MASS INDEX: 30.4 KG/M2 | WEIGHT: 178.1 LBS | HEIGHT: 64 IN | HEART RATE: 74 BPM

## 2019-01-23 DIAGNOSIS — I50.22 CHRONIC SYSTOLIC CONGESTIVE HEART FAILURE (H): ICD-10-CM

## 2019-01-23 DIAGNOSIS — Z95.811 LVAD (LEFT VENTRICULAR ASSIST DEVICE) PRESENT (H): Primary | ICD-10-CM

## 2019-01-23 DIAGNOSIS — I50.23 ACUTE ON CHRONIC SYSTOLIC HEART FAILURE (H): ICD-10-CM

## 2019-01-23 DIAGNOSIS — Z95.811 LVAD (LEFT VENTRICULAR ASSIST DEVICE) PRESENT (H): ICD-10-CM

## 2019-01-23 LAB
ALBUMIN SERPL-MCNC: 3.4 G/DL (ref 3.4–5)
ALP SERPL-CCNC: 81 U/L (ref 40–150)
ALT SERPL W P-5'-P-CCNC: 30 U/L (ref 0–70)
ANION GAP SERPL CALCULATED.3IONS-SCNC: 9 MMOL/L (ref 3–14)
AST SERPL W P-5'-P-CCNC: 19 U/L (ref 0–45)
BASOPHILS # BLD AUTO: 0.1 10E9/L (ref 0–0.2)
BASOPHILS NFR BLD AUTO: 1 %
BILIRUB SERPL-MCNC: 0.4 MG/DL (ref 0.2–1.3)
BUN SERPL-MCNC: 33 MG/DL (ref 7–30)
CALCIUM SERPL-MCNC: 8.7 MG/DL (ref 8.5–10.1)
CHLORIDE SERPL-SCNC: 109 MMOL/L (ref 94–109)
CO2 SERPL-SCNC: 22 MMOL/L (ref 20–32)
CREAT SERPL-MCNC: 1.41 MG/DL (ref 0.66–1.25)
DIFFERENTIAL METHOD BLD: ABNORMAL
EOSINOPHIL # BLD AUTO: 1.7 10E9/L (ref 0–0.7)
EOSINOPHIL NFR BLD AUTO: 21 %
ERYTHROCYTE [DISTWIDTH] IN BLOOD BY AUTOMATED COUNT: 16 % (ref 10–15)
GFR SERPL CREATININE-BSD FRML MDRD: 55 ML/MIN/{1.73_M2}
GLUCOSE SERPL-MCNC: 176 MG/DL (ref 70–99)
HCT VFR BLD AUTO: 32.4 % (ref 40–53)
HGB BLD-MCNC: 9.8 G/DL (ref 13.3–17.7)
INR PPP: 3.5 (ref 0.86–1.14)
LDH SERPL L TO P-CCNC: 152 U/L (ref 85–227)
LYMPHOCYTES # BLD AUTO: 1.5 10E9/L (ref 0.8–5.3)
LYMPHOCYTES NFR BLD AUTO: 19 %
MCH RBC QN AUTO: 27.5 PG (ref 26.5–33)
MCHC RBC AUTO-ENTMCNC: 30.2 G/DL (ref 31.5–36.5)
MCV RBC AUTO: 91 FL (ref 78–100)
MONOCYTES # BLD AUTO: 0.5 10E9/L (ref 0–1.3)
MONOCYTES NFR BLD AUTO: 6 %
NEUTROPHILS # BLD AUTO: 4.2 10E9/L (ref 1.6–8.3)
NEUTROPHILS NFR BLD AUTO: 53 %
NT-PROBNP SERPL-MCNC: 686 PG/ML (ref 0–125)
PLATELET # BLD AUTO: 346 10E9/L (ref 150–450)
POTASSIUM SERPL-SCNC: 4.4 MMOL/L (ref 3.4–5.3)
PROT SERPL-MCNC: 7.2 G/DL (ref 6.8–8.8)
RBC # BLD AUTO: 3.57 10E12/L (ref 4.4–5.9)
SODIUM SERPL-SCNC: 140 MMOL/L (ref 133–144)
URATE SERPL-MCNC: 4.4 MG/DL (ref 3.5–7.2)
WBC # BLD AUTO: 8 10E9/L (ref 4–11)

## 2019-01-23 PROCEDURE — 93306 TTE W/DOPPLER COMPLETE: CPT

## 2019-01-23 PROCEDURE — 93306 TTE W/DOPPLER COMPLETE: CPT | Mod: 26 | Performed by: INTERNAL MEDICINE

## 2019-01-23 PROCEDURE — 85610 PROTHROMBIN TIME: CPT | Performed by: NURSE PRACTITIONER

## 2019-01-23 PROCEDURE — G0463 HOSPITAL OUTPT CLINIC VISIT: HCPCS | Mod: 25,ZF

## 2019-01-23 PROCEDURE — 83615 LACTATE (LD) (LDH) ENZYME: CPT | Performed by: NURSE PRACTITIONER

## 2019-01-23 PROCEDURE — 84550 ASSAY OF BLOOD/URIC ACID: CPT | Performed by: NURSE PRACTITIONER

## 2019-01-23 PROCEDURE — 93750 INTERROGATION VAD IN PERSON: CPT | Mod: ZF | Performed by: NURSE PRACTITIONER

## 2019-01-23 PROCEDURE — 80053 COMPREHEN METABOLIC PANEL: CPT | Performed by: NURSE PRACTITIONER

## 2019-01-23 PROCEDURE — 87070 CULTURE OTHR SPECIMN AEROBIC: CPT | Performed by: NURSE PRACTITIONER

## 2019-01-23 PROCEDURE — 99214 OFFICE O/P EST MOD 30 MIN: CPT | Mod: 25 | Performed by: NURSE PRACTITIONER

## 2019-01-23 PROCEDURE — 83880 ASSAY OF NATRIURETIC PEPTIDE: CPT | Performed by: NURSE PRACTITIONER

## 2019-01-23 PROCEDURE — 36415 COLL VENOUS BLD VENIPUNCTURE: CPT | Performed by: NURSE PRACTITIONER

## 2019-01-23 PROCEDURE — 85025 COMPLETE CBC W/AUTO DIFF WBC: CPT | Performed by: NURSE PRACTITIONER

## 2019-01-23 RX ORDER — LISINOPRIL 2.5 MG/1
2.5 TABLET ORAL DAILY
Qty: 30 TABLET | Refills: 11 | Status: SHIPPED | OUTPATIENT
Start: 2019-01-23 | End: 2019-01-25

## 2019-01-23 RX ORDER — DOXYCYCLINE 100 MG/1
100 CAPSULE ORAL 2 TIMES DAILY
Qty: 28 CAPSULE | Refills: 0 | Status: SHIPPED | OUTPATIENT
Start: 2019-01-23 | End: 2019-02-11

## 2019-01-23 RX ORDER — HYDRALAZINE HYDROCHLORIDE 50 MG/1
100 TABLET, FILM COATED ORAL 3 TIMES DAILY
Qty: 90 TABLET | Refills: 0 | COMMUNITY
Start: 2019-01-23 | End: 2019-01-30

## 2019-01-23 ASSESSMENT — PAIN SCALES - GENERAL: PAINLEVEL: NO PAIN (0)

## 2019-01-23 ASSESSMENT — MIFFLIN-ST. JEOR: SCORE: 1548.86

## 2019-01-23 NOTE — NURSING NOTE
Vitals completed successfully and medication reconciled.     Pat Madera, IZZY  12:11 PM  Chief Complaint   Patient presents with     Follow Up     VAD F/U

## 2019-01-23 NOTE — PROGRESS NOTES
ANTICOAGULATION FOLLOW-UP CLINIC VISIT    Patient Name:  Mariusz Sharp  Date:  1/23/2019  Contact Type:  Telephone    SUBJECTIVE:        OBJECTIVE    INR   Date Value Ref Range Status   01/23/2019 3.50 (H) 0.86 - 1.14 Final     Chromogenic Factor 10   Date Value Ref Range Status   01/11/2019 72 70 - 130 % Final     Comment:     Therapeutic Range:  A Chromogenic Factor 10 level of approximately 20-40%   inversely correlates with an INR of 2-3 for patients receiving Warfarin.   Chromogenic Factor 10 levels below 20% indicate an INR greater than 3 and   levels above 40% indicate an INR less than 2.         ASSESSMENT / PLAN  No question data found.  Anticoagulation Summary  As of 1/23/2019    INR goal:   2.0-3.0   TTR:   0.0 % (2 d)   INR used for dosing:   3.50! (1/23/2019)   Warfarin maintenance plan:   No maintenance plan   Full warfarin instructions:   1/23: 5 mg; 1/24: 5 mg   Plan last modified:   Chasity Gonzalez RN (1/17/2019)   Next INR check:   1/25/2019   Priority:   INR   Target end date:   Indefinite    Indications    Heart failure (H) [I50.9]  LVAD (left ventricular assist device) present (H) [Z95.811]             Anticoagulation Episode Summary     INR check location:       Preferred lab:       Send INR reminders to:   [facility]    Comments:   LVAD implanted 12/31 ASA 81mg Daily Has 2.5mg tablets   1/17/19 Staying at [facility]        Anticoagulation Care Providers     Provider Role Specialty Phone number    Jenni Ortiz MD [facility] Cardiology [phone]            See the Encounter Report to view Anticoagulation Flowsheet and Dosing Calendar (Go to Encounters tab in chart review, and find the Anticoagulation Therapy Visit)    Left message for patient with results and dosing recommendations. Asked patient to call back to report any missed doses, falls, signs and symptoms of bleeding or clotting, any changes in health, medication, or diet. Asked patient to call back with any  questions or concerns.     Sophy Griggs, RN

## 2019-01-23 NOTE — NURSING NOTE
Saw patient/caregiver in clinic to check in 1 week post discharge. Pt reports VAD parameters WNL except frequent low flows and weight gain - pt reports a robust appetite and has gained approx 4lb in one week. Reviewed medications and answered any questions. Patient reports sleeping well and no anxiety since being home with LVAD. Patient is able to move around the house and care for himself independently.    Discussed specific new problems/stressors since being discharged from the hospital: low flow alarms are bothersome, however pt is asymptomatic. Discussed in clinic to increase po fluid intake and pt started on new bp med. Empathized with patient and reviewed coping strategies: enlisting support from friends and love ones, attending patient and caregiver support groups, reviewing LVAD educational materials to reinforce knowledge, and talking about concerns with family/care providers/trusted others. Encouraged pt to page VAD Coordinator with any issues or questions. Pt verbalizes understanding.

## 2019-01-23 NOTE — PATIENT INSTRUCTIONS
Medications:  1. STOP Lasix  2. START Lisinopril 2.5mg daily (prescription sent to [facility] pharmacy)  3. START taking Doxycycline 100mg twice daily for 2 weeks (for potential driveline infection)    Follow-up:  1. Labs and blood pressure check on Friday, 1/25  2. 1/30/19 with Meredith Garcia NP    Instructions:  1. Call the VAD coordinator on call if you feel lightheaded or dizzy  2. hydrate    Page the VAD Coordinator on call if you gain more than 3 lb in a day or 5 in a week. Please also page if you feel unwell or have alarms.     Great to see you in clinic today. To Page the VAD Coordinator on call, dial [phone] option #4 and ask to speak to the VAD coordinator on call.

## 2019-01-23 NOTE — NURSING NOTE
1). PUMP DATA  Primary controller serial number: [id number]     3:   Flow: 3.4 L/min,    Speed: 5300 RPMs,     PI: 5.7 ,  Power: 3.9 Parker, Hct: 32.4     Primary controller   Back up battery: Patient use: 11, Replace in: 27  Months     Data downloaded: No   Equipment and driveline assessed for damage: Yes     Back up : Serial number: [id number]  Back up battery: Patient use: 7 Replace in: 31  Months  Programmed settings identical to the settings on the primary controller :Yes      Education complete: Yes   Charge the BACKUP controller s backup battery every 6 months  Perform a self test on BACKUP every 6 months  Change the MPU s batteries every 6 months:Yes    2). ALARMS  Alarms reported by patient since last pump evaluation: Yes - several low flow alarms last evening, flow as low as 2.2  Alarms or other finding noted during pump data history and alarm download: Yes - several PI events, history only back 12hrs. PI ranging 2.1-8.6    Action Taken:  Reviewed data with patient: Yes      3). DRESSING CHANGE / DRIVELINE ASSESSMENT  Dressing change completed today: Yes  Appearance of Driveline site: redness, drainage (yellow, scant). Site cultured as appears more reddened than preciously.     Driveline stabilization: Method: [employer]  [ Teaching reinforced on need for stabilization of Driveline. ]

## 2019-01-23 NOTE — PROGRESS NOTES
HPI:   Mr. 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 Hospital course notable for mild RV dysfunction improved with dobutamine. He was discharged to TCU where he stayed for several days before he was discharged to home. Unfortunately, he presented to the ED after a mechanical fall and subsequent head injury with no LOC. Head CT was negative. Early this week, his hydralazine was doubled with high MAPs. He returns to clinic for follow up.    Red reports no shortness of breath, rare fleeting chest pain, occasional palpitations, no lightheadedness, syncope, slight and improving lower extremity edema, no anorexia, orthopnea, or PND.    Driveline site is slightly more red than prior. No fevers or chills, discharge or tenderness. No melena, hematuria, or significant epistaxis. No focal deficits.     PAST MEDICAL HISTORY:  Past Medical History:   Diagnosis Date     Acute kidney injury (H)      CKD (chronic kidney disease)      Coronary artery disease      Diabetes mellitus (H)      HTN (hypertension)      Hyperlipidemia      Ischemic cardiomyopathy      Paroxysmal atrial flutter (H)      Systolic heart failure (H)      Ventricular tachycardia (H)        FAMILY HISTORY:  No family history on file.    SOCIAL HISTORY:  Social History     Social History     Marital status: Single     Spouse name: N/A     Number of children: N/A     Years of education: N/A     Social History Main Topics     Smoking status: Never Smoker     Smokeless tobacco: Never Used     Alcohol use Not on file     Drug use: Not on file     Sexual activity: Not on file     Other Topics Concern     Not on file     Social History Narrative       CURRENT MEDICATIONS:  Outpatient Medications Prior to Visit   Medication Sig Dispense  Refill     acetaminophen (TYLENOL) 325 MG tablet Take 2 tablets (650 mg) by mouth every 6 hours as needed for pain 1 Bottle 0     amiodarone (PACERONE/CODARONE) 200 MG tablet Take 1 tablet (200 mg) by mouth daily 30 tablet 0     aspirin (ASA) 81 MG chewable tablet Take 1 tablet (81 mg) by mouth daily 30 tablet 0     digoxin (LANOXIN) 125 MCG tablet Take 1 tablet (125 mcg) by mouth daily 30 tablet 0     famotidine (PEPCID) 20 MG tablet Take 1 tablet (20 mg) by mouth 2 times daily 60 tablet 0     furosemide (LASIX) 20 MG tablet Take 1 tablet (20 mg) by mouth daily 30 tablet 0     hydrALAZINE (APRESOLINE) 50 MG tablet Take 1 tablet (50 mg) by mouth 3 times daily 90 tablet 0     insulin aspart (NOVOLOG PEN) 100 UNIT/ML pen Inject 1-10 Units Subcutaneous 3 times daily (before meals) Correction Scale - HIGH INSULIN RESISTANCE DOSING     -164 =1 units.   -189 =2.   -214 =3.   -239 =4.   -264 =5.   -289 =6.   -314 =7.   -339 =8.   -364 =9.  BG greater than or equal to 365 give 10 units  To be given with meal insulin, based on pre-meal BG 9 mL 0     insulin glargine (LANTUS SOLOSTAR PEN) 100 UNIT/ML pen Inject 20 Units Subcutaneous At Bedtime 6 mL 0     nitroGLYcerin (NITROSTAT) 0.4 MG sublingual tablet Place 1 tablet (0.4 mg) under the tongue every 5 minutes as needed for chest pain For chest pain place 1 tablet under the tongue every 5 minutes for 3 doses. If symptoms persist 5 minutes after 1st dose call 911. 10 tablet 0     polyethylene glycol (MIRALAX/GLYCOLAX) packet Take 17 g by mouth daily as needed for constipation 14 packet 0     rosuvastatin (CRESTOR) 20 MG tablet Take 1 tablet (20 mg) by mouth At Bedtime 30 tablet 0     senna-docusate (SENOKOT-S/PERICOLACE) 8.6-50 MG tablet Take 1 tablet by mouth daily as needed for constipation hold for loose stools 60 tablet 0     warfarin (COUMADIN) 2.5 MG tablet To take dose as directed by your INR clinic 200 tablet 1      "insulin aspart (NOVOLOG PEN) 100 UNIT/ML pen Inject 1-7 Units Subcutaneous At Bedtime HIGH INSULIN RESISTANCE DOSING    Bedtime  For  - 224 give 1 units.   For  - 249 give 2 units.   For  - 274 give 3 units.   For  - 299 give 4 units.   For  - 324 give 5 units.   For  - 349 give 6 units.   For BG greater than or equal to 350 give 7 units. (Patient not taking: Reported on 2019) 2.1 mL 0     insulin aspart (NOVOLOG PEN) 100 UNIT/ML pen Prandial Dosin units per 10 grams of carbohydrate each Meal or Snack.  Only chart total amount of units given.  Do not give if pre-prandial glucose is less than 60 mg/dL. If given at mealtime, administer within 30 minutes of start of meal. (Patient not taking: Reported on 2019)       No facility-administered medications prior to visit.      ROS:   CONSTITUTIONAL: Denies fever, chills, fatigue, or weight fluctuations.   HEENT: Denies headache and changes in speech. He complains of vision changes.   CV: Refer to HPI.   PULMONARY:Denies shortness of breath, cough, or previous TB exposure.   GI:Denies nausea, vomiting, diarrhea, and abdominal pain. Bowel movements are regular.   :Denies urinary alterations, dysuria, urinary frequency, hematuria, and abnormal drainage.   EXT:Denies lower extremity edema.   SKIN:Denies abnormal rashes or lesions.   MUSCULOSKELETAL:Denies upper or lower extremity weakness and pain.   NEUROLOGIC:Denies lightheadedness, dizziness, seizures, or upper or lower extremity paresthesia.     EXAM:  BP (!) 98/10 (BP Location: Left arm, Patient Position: Chair, Cuff Size: Adult Regular)   Pulse 74   Ht 1.626 m (5' 4\")   Wt 80.8 kg (178 lb 1.6 oz)   SpO2 100%   BMI 30.57 kg/m    GENERAL: Appears alert and oriented times three.   HEENT: Eye symmetrical and free of discharge bilaterally. Mucous membranes moist and without lesions.  NECK: Supple and without lymphadenopathy.   CV: pulsatile. Mechanical hum of LVAD. NO " JVD  RESPIRATORY: Respirations regular, even, and unlabored. Lungs CTA throughout.   GI: Soft and non distended with normoactive bowel sounds present in all quadrants. No tenderness, rebound, guarding. No organomegaly.    EXTREMITIES: No peripheral edema.   NEUROLOGIC: Alert and orientated x 3. CN II-XII grossly intact. No focal deficits.   MUSCULOSKELETAL: No joint swelling or tenderness.   SKIN: No jaundice. No rashes or lesions.     VAD Interrogation on 1/23/19: VAD interrogation reviewed with VAD coordinator. Agree with findings. History is only 12 hours long due to very frequent PI Events and low flow alarms. No  power spikes or other findings suspicious of pump malfunction noted.     Labs:  CBC RESULTS:  Lab Results   Component Value Date    WBC 8.0 01/23/2019    RBC 3.57 (L) 01/23/2019    HGB 9.8 (L) 01/23/2019    HCT 32.4 (L) 01/23/2019    MCV 91 01/23/2019    MCH 27.5 01/23/2019    MCHC 30.2 (L) 01/23/2019    RDW 16.0 (H) 01/23/2019     01/23/2019       CMP RESULTS:  Lab Results   Component Value Date     01/23/2019    POTASSIUM 4.4 01/23/2019    CHLORIDE 109 01/23/2019    CO2 22 01/23/2019    ANIONGAP 9 01/23/2019     (H) 01/23/2019    BUN 33 (H) 01/23/2019    CR 1.41 (H) 01/23/2019    GFRESTIMATED 55 (L) 01/23/2019    GFRESTBLACK 64 01/23/2019    ARLENE 8.7 01/23/2019    BILITOTAL 0.4 01/23/2019    ALBUMIN 3.4 01/23/2019    ALKPHOS 81 01/23/2019    ALT 30 01/23/2019    AST 19 01/23/2019        INR RESULTS:  Lab Results   Component Value Date    INR 3.50 (H) 01/23/2019       Lab Results   Component Value Date    MAG 2.2 01/07/2019     Lab Results   Component Value Date    NTBNPI 1,177 (H) 01/10/2019     Lab Results   Component Value Date    NTBNP 686 (H) 01/23/2019     Optimization echo today:  Speed optimization limited by relatively small LV at baseline     Cardiopulmonary Stress test 8/6/18  Walked 7 min 22 seconds. MVO2 12.5.  RER 1.1, VE/VCO2 slope: 48.24. /52 at rest, 105/59  exercise.    Heart catheterization 7/11/18 at OSH       Assessment and Plan:   Red is a 58 year old man s/p HM3 LVAD with frequent low flow alarms and PI events. He has no evidence of RV failure, appears euvolemic, and hypertensive. We'll stop his diuretics and add lisinopril 2.5 mg once daily. He'll return to clinic this Friday for a repeat BP check and labs. Return to clinic in 1 week as well.     35 minutes spent in direct care, >50% in counseling        CC  ALETHEA VOGT

## 2019-01-25 ENCOUNTER — CARE COORDINATION (OUTPATIENT)
Dept: CARDIOLOGY | Facility: CLINIC | Age: 59
End: 2019-01-25

## 2019-01-25 ENCOUNTER — OFFICE VISIT (OUTPATIENT)
Dept: CARDIOLOGY | Facility: CLINIC | Age: 59
End: 2019-01-25
Attending: NURSE PRACTITIONER
Payer: COMMERCIAL

## 2019-01-25 ENCOUNTER — ANTICOAGULATION THERAPY VISIT (OUTPATIENT)
Dept: ANTICOAGULATION | Facility: CLINIC | Age: 59
End: 2019-01-25

## 2019-01-25 VITALS
HEIGHT: 64 IN | SYSTOLIC BLOOD PRESSURE: 80 MMHG | HEART RATE: 76 BPM | BODY MASS INDEX: 30.4 KG/M2 | WEIGHT: 178.1 LBS | TEMPERATURE: 98.3 F | OXYGEN SATURATION: 98 %

## 2019-01-25 DIAGNOSIS — Z95.811 LVAD (LEFT VENTRICULAR ASSIST DEVICE) PRESENT (H): ICD-10-CM

## 2019-01-25 DIAGNOSIS — I50.22 CHRONIC SYSTOLIC CONGESTIVE HEART FAILURE (H): ICD-10-CM

## 2019-01-25 DIAGNOSIS — Z95.811 LVAD (LEFT VENTRICULAR ASSIST DEVICE) PRESENT (H): Primary | ICD-10-CM

## 2019-01-25 DIAGNOSIS — Z79.01 LONG TERM (CURRENT) USE OF ANTICOAGULANTS: ICD-10-CM

## 2019-01-25 DIAGNOSIS — I50.23 ACUTE ON CHRONIC SYSTOLIC HEART FAILURE (H): ICD-10-CM

## 2019-01-25 LAB
BACTERIA SPEC CULT: NO GROWTH
ERYTHROCYTE [DISTWIDTH] IN BLOOD BY AUTOMATED COUNT: 15.9 % (ref 10–15)
HCT VFR BLD AUTO: 35.3 % (ref 40–53)
HGB BLD-MCNC: 10.7 G/DL (ref 13.3–17.7)
INR PPP: 3.04 (ref 0.86–1.14)
Lab: NORMAL
MCH RBC QN AUTO: 27.2 PG (ref 26.5–33)
MCHC RBC AUTO-ENTMCNC: 30.3 G/DL (ref 31.5–36.5)
MCV RBC AUTO: 90 FL (ref 78–100)
PLATELET # BLD AUTO: 359 10E9/L (ref 150–450)
RBC # BLD AUTO: 3.94 10E12/L (ref 4.4–5.9)
SPECIMEN SOURCE: NORMAL
WBC # BLD AUTO: 7.8 10E9/L (ref 4–11)

## 2019-01-25 PROCEDURE — 85027 COMPLETE CBC AUTOMATED: CPT | Performed by: NURSE PRACTITIONER

## 2019-01-25 PROCEDURE — 36415 COLL VENOUS BLD VENIPUNCTURE: CPT | Performed by: NURSE PRACTITIONER

## 2019-01-25 PROCEDURE — 85610 PROTHROMBIN TIME: CPT | Performed by: NURSE PRACTITIONER

## 2019-01-25 RX ORDER — LISINOPRIL 2.5 MG/1
5 TABLET ORAL DAILY
Qty: 30 TABLET | Refills: 11 | COMMUNITY
Start: 2019-01-25 | End: 2019-01-30

## 2019-01-25 RX ORDER — CARVEDILOL 3.12 MG/1
3.12 TABLET ORAL 2 TIMES DAILY WITH MEALS
Qty: 60 TABLET | Refills: 11 | Status: SHIPPED | OUTPATIENT
Start: 2019-01-25 | End: 2019-02-01

## 2019-01-25 ASSESSMENT — MIFFLIN-ST. JEOR: SCORE: 1548.86

## 2019-01-25 ASSESSMENT — PAIN SCALES - GENERAL: PAINLEVEL: NO PAIN (0)

## 2019-01-25 NOTE — NURSING NOTE
Pt seen for BP check today. Continues to have frequent low flow alarms. Doppler MAP 80, NIBP pressure 109/81 (MAP 90). Pt has very faint palpable pulse. Reviewed with Meredith Garcia NP, who instructed to increase Lisinopril to 5mg daily and add Coreg 3.125mg daily.   Pt verbalized understanding and will call with lightheadedness or dizziness, or symptoms related to low flow alarms.

## 2019-01-25 NOTE — NURSING NOTE
Chief Complaint   Patient presents with     Allied Health Visit     BP check, LVAD patient     Vitals were taken and medications were reconciled.  Patient was sent with AVS as value was normal and instructed patient LVAD Coordinator will call if needed.    Kristine Garcia CMA    9:40 AM

## 2019-01-25 NOTE — LETTER
1/25/2019      RE: Mariusz Sharp  [address] [place] [id number]       Dear Colleague,    Thank you for the opportunity to participate in the care of your patient, Mariusz Sharp, at the [facility]. Please see a copy of my visit note below.    Please do not hesitate to contact me if you have any questions/concerns.     Sincerely,      CVC CMA

## 2019-01-25 NOTE — PROGRESS NOTES
ANTICOAGULATION FOLLOW-UP CLINIC VISIT    Patient Name:  Mairusz Sharp  Date:  1/25/2019  Contact Type:  Telephone    SUBJECTIVE:     Patient Findings     Positives:   No Problem Findings    Comments:   Spoke to patient.  Started Lisinopril on 1/23, 5mg daily. He is having low flow alarms.           OBJECTIVE    INR   Date Value Ref Range Status   01/25/2019 3.04 (H) 0.86 - 1.14 Final     Chromogenic Factor 10   Date Value Ref Range Status   01/11/2019 72 70 - 130 % Final     Comment:     Therapeutic Range:  A Chromogenic Factor 10 level of approximately 20-40%   inversely correlates with an INR of 2-3 for patients receiving Warfarin.   Chromogenic Factor 10 levels below 20% indicate an INR greater than 3 and   levels above 40% indicate an INR less than 2.         ASSESSMENT / PLAN  INR assessment THER    Recheck INR In: 3 DAYS    INR Location Clinic      Anticoagulation Summary  As of 1/25/2019    INR goal:   2.0-3.0   TTR:   0.0 % (4 d)   INR used for dosing:   3.04! (1/25/2019)   Warfarin maintenance plan:   No maintenance plan   Full warfarin instructions:   1/25: 7.5 mg; 1/26: 5 mg; 1/27: 7.5 mg   Plan last modified:   Chasity Gonzalez RN (1/17/2019)   Next INR check:   1/28/2019   Priority:   INR   Target end date:   Indefinite    Indications    Heart failure (H) [I50.9]  LVAD (left ventricular assist device) present (H) [Z95.811]             Anticoagulation Episode Summary     INR check location:       Preferred lab:       Send INR reminders to:   [facility]    Comments:   LVAD implanted 12/31 ASA 81mg Daily Has 2.5mg tablets   1/17/19 Staying at [facility]        Anticoagulation Care Providers     Provider Role Specialty Phone number    Jenni Ortiz MD Responsible Cardiology [phone]            See the Encounter Report to view Anticoagulation Flowsheet and Dosing Calendar (Go to Encounters tab in chart review, and find the Anticoagulation Therapy Visit)    Spoke with patient. Gave them  their lab results and new warfarin recommendation.  No changes in health, medication, or diet. No missed doses, no falls. No signs or symptoms of bleed or clotting.    Patient had LVAD placed on:   12/31/18  Patient's current Aspirin dose: 81mg daily   LVAD Protocol followed:  Yes.   If Not Followed Explanation:  Sandor Zaman RN

## 2019-01-28 ENCOUNTER — ANTICOAGULATION THERAPY VISIT (OUTPATIENT)
Dept: ANTICOAGULATION | Facility: CLINIC | Age: 59
End: 2019-01-28

## 2019-01-28 DIAGNOSIS — I50.22 CHRONIC SYSTOLIC CONGESTIVE HEART FAILURE (H): ICD-10-CM

## 2019-01-28 DIAGNOSIS — I50.23 ACUTE ON CHRONIC SYSTOLIC HEART FAILURE (H): ICD-10-CM

## 2019-01-28 DIAGNOSIS — Z95.811 LVAD (LEFT VENTRICULAR ASSIST DEVICE) PRESENT (H): ICD-10-CM

## 2019-01-28 DIAGNOSIS — Z95.811 LVAD (LEFT VENTRICULAR ASSIST DEVICE) PRESENT (H): Primary | ICD-10-CM

## 2019-01-28 DIAGNOSIS — Z79.01 LONG TERM (CURRENT) USE OF ANTICOAGULANTS: ICD-10-CM

## 2019-01-28 LAB — INR PPP: 2.19 (ref 0.86–1.14)

## 2019-01-28 NOTE — PROGRESS NOTES
ANTICOAGULATION FOLLOW-UP CLINIC VISIT    Patient Name:  Mariusz Sharp  Date:  2019  Contact Type:  Telephone    SUBJECTIVE:     Patient Findings     Positives:   Antibiotic use or infection (Doxycycline for 2 weeks--)           OBJECTIVE    INR   Date Value Ref Range Status   2019 2.19 (H) 0.86 - 1.14 Final     Chromogenic Factor 10   Date Value Ref Range Status   2019 72 70 - 130 % Final     Comment:     Therapeutic Range:  A Chromogenic Factor 10 level of approximately 20-40%   inversely correlates with an INR of 2-3 for patients receiving Warfarin.   Chromogenic Factor 10 levels below 20% indicate an INR greater than 3 and   levels above 40% indicate an INR less than 2.         ASSESSMENT / PLAN  INR assessment THER    Recheck INR In: 4 DAYS    INR Location Clinic      Anticoagulation Summary  As of 2019    INR goal:   2.0-3.0   TTR:   39.0 % (1 wk)   INR used for dosin.19 (2019)   Warfarin maintenance plan:   No maintenance plan   Full warfarin instructions:   : 10 mg; : 7.5 mg; : 10 mg; : 7.5 mg   Plan last modified:   Chasity Gonzalez RN (2019)   Next INR check:   2019   Priority:   INR   Target end date:   Indefinite    Indications    Heart failure (H) [I50.9]  LVAD (left ventricular assist device) present (H) [Z95.811]             Anticoagulation Episode Summary     INR check location:       Preferred lab:       Send INR reminders to:    [facility]    Comments:   LVAD implanted  ASA 81mg Daily Has 2.5mg tablets   19 Staying at [facility]        Anticoagulation Care Providers     Provider Role Specialty Phone number    Jenni Ortiz MD Responsible Cardiology [phone]            See the Encounter Report to view Anticoagulation Flowsheet and Dosing Calendar (Go to Encounters tab in chart review, and find the Anticoagulation Therapy Visit)    Spoke with patient.    Nasreen Kathleen RN

## 2019-01-30 ENCOUNTER — ANCILLARY PROCEDURE (OUTPATIENT)
Dept: CT IMAGING | Facility: CLINIC | Age: 59
End: 2019-01-30
Payer: COMMERCIAL

## 2019-01-30 ENCOUNTER — ANTICOAGULATION THERAPY VISIT (OUTPATIENT)
Dept: ANTICOAGULATION | Facility: CLINIC | Age: 59
End: 2019-01-30

## 2019-01-30 ENCOUNTER — OFFICE VISIT (OUTPATIENT)
Dept: CARDIOLOGY | Facility: CLINIC | Age: 59
End: 2019-01-30
Attending: INTERNAL MEDICINE
Payer: COMMERCIAL

## 2019-01-30 VITALS
OXYGEN SATURATION: 99 % | SYSTOLIC BLOOD PRESSURE: 78 MMHG | HEIGHT: 64 IN | WEIGHT: 181.8 LBS | HEART RATE: 68 BPM | BODY MASS INDEX: 31.04 KG/M2

## 2019-01-30 DIAGNOSIS — Z95.811 LVAD (LEFT VENTRICULAR ASSIST DEVICE) PRESENT (H): Primary | ICD-10-CM

## 2019-01-30 DIAGNOSIS — Z95.811 LVAD (LEFT VENTRICULAR ASSIST DEVICE) PRESENT (H): ICD-10-CM

## 2019-01-30 DIAGNOSIS — I50.22 CHRONIC SYSTOLIC CONGESTIVE HEART FAILURE (H): ICD-10-CM

## 2019-01-30 DIAGNOSIS — I50.22 CHRONIC SYSTOLIC HEART FAILURE (H): ICD-10-CM

## 2019-01-30 DIAGNOSIS — I50.23 ACUTE ON CHRONIC SYSTOLIC HEART FAILURE (H): ICD-10-CM

## 2019-01-30 LAB
ALBUMIN SERPL-MCNC: 3.6 G/DL (ref 3.4–5)
ALBUMIN UR-MCNC: 30 MG/DL
ALP SERPL-CCNC: 78 U/L (ref 40–150)
ALT SERPL W P-5'-P-CCNC: 25 U/L (ref 0–70)
ANION GAP SERPL CALCULATED.3IONS-SCNC: 9 MMOL/L (ref 3–14)
APPEARANCE UR: ABNORMAL
AST SERPL W P-5'-P-CCNC: 20 U/L (ref 0–45)
BACTERIA #/AREA URNS HPF: ABNORMAL /HPF
BILIRUB SERPL-MCNC: 0.7 MG/DL (ref 0.2–1.3)
BILIRUB UR QL STRIP: NEGATIVE
BUN SERPL-MCNC: 32 MG/DL (ref 7–30)
CALCIUM SERPL-MCNC: 8.7 MG/DL (ref 8.5–10.1)
CHLORIDE SERPL-SCNC: 104 MMOL/L (ref 94–109)
CO2 SERPL-SCNC: 24 MMOL/L (ref 20–32)
COLOR UR AUTO: ABNORMAL
CREAT SERPL-MCNC: 1.35 MG/DL (ref 0.66–1.25)
ERYTHROCYTE [DISTWIDTH] IN BLOOD BY AUTOMATED COUNT: 15.9 % (ref 10–15)
GFR SERPL CREATININE-BSD FRML MDRD: 58 ML/MIN/{1.73_M2}
GLUCOSE SERPL-MCNC: 201 MG/DL (ref 70–99)
GLUCOSE UR STRIP-MCNC: NEGATIVE MG/DL
HCT VFR BLD AUTO: 35.9 % (ref 40–53)
HGB BLD-MCNC: 10.9 G/DL (ref 13.3–17.7)
HGB UR QL STRIP: ABNORMAL
INR PPP: 2.57 (ref 0.86–1.14)
KETONES UR STRIP-MCNC: NEGATIVE MG/DL
LDH SERPL L TO P-CCNC: 143 U/L (ref 85–227)
LEUKOCYTE ESTERASE UR QL STRIP: NEGATIVE
MCH RBC QN AUTO: 27.1 PG (ref 26.5–33)
MCHC RBC AUTO-ENTMCNC: 30.4 G/DL (ref 31.5–36.5)
MCV RBC AUTO: 89 FL (ref 78–100)
NITRATE UR QL: NEGATIVE
PH UR STRIP: 5 PH (ref 5–7)
PLATELET # BLD AUTO: 288 10E9/L (ref 150–450)
POTASSIUM SERPL-SCNC: 4.3 MMOL/L (ref 3.4–5.3)
PROT SERPL-MCNC: 7.5 G/DL (ref 6.8–8.8)
RBC # BLD AUTO: 4.02 10E12/L (ref 4.4–5.9)
RBC #/AREA URNS AUTO: >182 /HPF (ref 0–2)
SODIUM SERPL-SCNC: 137 MMOL/L (ref 133–144)
SOURCE: ABNORMAL
SP GR UR STRIP: 1.02 (ref 1–1.03)
TRANS CELLS #/AREA URNS HPF: 1 /HPF
UROBILINOGEN UR STRIP-MCNC: 0 MG/DL (ref 0–2)
WBC # BLD AUTO: 9.3 10E9/L (ref 4–11)
WBC #/AREA URNS AUTO: 82 /HPF (ref 0–5)
WBC CLUMPS #/AREA URNS HPF: PRESENT /HPF

## 2019-01-30 PROCEDURE — 93922 UPR/L XTREMITY ART 2 LEVELS: CPT | Mod: ZF

## 2019-01-30 PROCEDURE — 81001 URINALYSIS AUTO W/SCOPE: CPT | Performed by: NURSE PRACTITIONER

## 2019-01-30 PROCEDURE — 80053 COMPREHEN METABOLIC PANEL: CPT | Performed by: NURSE PRACTITIONER

## 2019-01-30 PROCEDURE — 85027 COMPLETE CBC AUTOMATED: CPT | Performed by: NURSE PRACTITIONER

## 2019-01-30 PROCEDURE — 85610 PROTHROMBIN TIME: CPT | Performed by: NURSE PRACTITIONER

## 2019-01-30 PROCEDURE — 87086 URINE CULTURE/COLONY COUNT: CPT | Performed by: NURSE PRACTITIONER

## 2019-01-30 PROCEDURE — G0463 HOSPITAL OUTPT CLINIC VISIT: HCPCS | Mod: 25,ZF

## 2019-01-30 PROCEDURE — 93750 INTERROGATION VAD IN PERSON: CPT | Mod: ZF | Performed by: NURSE PRACTITIONER

## 2019-01-30 PROCEDURE — 99214 OFFICE O/P EST MOD 30 MIN: CPT | Mod: 25 | Performed by: NURSE PRACTITIONER

## 2019-01-30 PROCEDURE — 83615 LACTATE (LD) (LDH) ENZYME: CPT | Performed by: NURSE PRACTITIONER

## 2019-01-30 PROCEDURE — 36415 COLL VENOUS BLD VENIPUNCTURE: CPT | Performed by: NURSE PRACTITIONER

## 2019-01-30 RX ORDER — HYDRALAZINE HYDROCHLORIDE 50 MG/1
100 TABLET, FILM COATED ORAL 3 TIMES DAILY
Qty: 100 TABLET | Refills: 11 | Status: SHIPPED | OUTPATIENT
Start: 2019-01-30 | End: 2019-04-15

## 2019-01-30 RX ORDER — LISINOPRIL 2.5 MG/1
5 TABLET ORAL 2 TIMES DAILY
Qty: 120 TABLET | Refills: 11 | Status: SHIPPED | OUTPATIENT
Start: 2019-01-30 | End: 2019-06-26

## 2019-01-30 RX ORDER — IOPAMIDOL 755 MG/ML
100 INJECTION, SOLUTION INTRAVASCULAR ONCE
Status: DISCONTINUED | OUTPATIENT
Start: 2019-01-30 | End: 2019-01-30

## 2019-01-30 RX ORDER — IOPAMIDOL 755 MG/ML
108 INJECTION, SOLUTION INTRAVASCULAR ONCE
Status: COMPLETED | OUTPATIENT
Start: 2019-01-30 | End: 2019-01-30

## 2019-01-30 RX ORDER — SULFAMETHOXAZOLE/TRIMETHOPRIM 800-160 MG
1 TABLET ORAL 2 TIMES DAILY
Qty: 14 TABLET | Refills: 0 | Status: SHIPPED | OUTPATIENT
Start: 2019-01-30 | End: 2019-02-11

## 2019-01-30 RX ADMIN — IOPAMIDOL 108 ML: 755 INJECTION, SOLUTION INTRAVASCULAR at 14:22

## 2019-01-30 ASSESSMENT — MIFFLIN-ST. JEOR: SCORE: 1565.64

## 2019-01-30 ASSESSMENT — PAIN SCALES - GENERAL: PAINLEVEL: NO PAIN (0)

## 2019-01-30 NOTE — PROGRESS NOTES
ANTICOAGULATION FOLLOW-UP CLINIC VISIT    Patient Name:  Mariusz Sharp  Date:  2019  Contact Type:  Telephone    SUBJECTIVE:     Patient Findings     Positives:   Antibiotic use or infection (continues doxycycline), No Problem Findings           OBJECTIVE    INR   Date Value Ref Range Status   2019 2.57 (H) 0.86 - 1.14 Final     Chromogenic Factor 10   Date Value Ref Range Status   2019 72 70 - 130 % Final     Comment:     Therapeutic Range:  A Chromogenic Factor 10 level of approximately 20-40%   inversely correlates with an INR of 2-3 for patients receiving Warfarin.   Chromogenic Factor 10 levels below 20% indicate an INR greater than 3 and   levels above 40% indicate an INR less than 2.         ASSESSMENT / PLAN  INR assessment THER    Recheck INR In: 5 DAYS    INR Location Clinic      Anticoagulation Summary  As of 2019    INR goal:   2.0-3.0   TTR:   52.2 % (1.3 wk)   INR used for dosin.57 (2019)   Warfarin maintenance plan:   No maintenance plan   Full warfarin instructions:   : 7.5 mg; : 7.5 mg; /: 5 mg; 2/2: 7.5 mg; 2/3: 7.5 mg   Plan last modified:   Chasity Gonzalez RN (2019)   Next INR check:   2019   Priority:   INR   Target end date:   Indefinite    Indications    Heart failure (H) [I50.9]  LVAD (left ventricular assist device) present (H) [Z95.811]             Anticoagulation Episode Summary     INR check location:       Preferred lab:       Send INR reminders to:    [facility]    Comments:   LVAD implanted  ASA 81mg Daily Has 2.5mg tablets   19 Staying at [facility]        Anticoagulation Care Providers     Provider Role Specialty Phone number    Jenni Ortiz MD Responsible Cardiology [phone]            See the Encounter Report to view Anticoagulation Flowsheet and Dosing Calendar (Go to Encounters tab in chart review, and find the Anticoagulation Therapy Visit)    Spoke with patient.    Nasreen Kathleen RN

## 2019-01-30 NOTE — DISCHARGE INSTRUCTIONS

## 2019-01-30 NOTE — PATIENT INSTRUCTIONS
Medications:  1. Increase lisinpril to 10mg daily.  2. Start Bactrim DS for 7 days for UTI.    Follow-up:  1. Please get CTA chest today on 1st floor.  We will call you with results.  2. Please come to clinic for a nurse visit on Friday 2/1 to look at your VAD history and check BP. Please get labs beforehand.    Instructions:  1. Call VAD Coordinator on call if you have any more alarms, if you feel dizzy or lightheaded or have any other concerns.    Page the VAD Coordinator on call if you gain more than 3 lb in a day or 5 in a week. Please also page if you feel unwell or have alarms.     Great to see you in clinic today. To Page the VAD Coordinator on call, dial [phone] option #[phone] and ask to speak to the VAD coordinator on call.

## 2019-01-30 NOTE — NURSING NOTE
"1). PUMP DATA  Primary controller serial number: [id number]    HM 3:   Flow: 4.0 L/min,    Speed: 5300 RPMs,     PI: 4.6 ,  Power: 3.9 Parker, Hct: 21  Decreased hematocrit to lowest setting in effort to decrease amount of low flow alarms patient is experiencing. Discussed this with DELILAH Harper.     Primary controller   Back up battery: Patient use: 11, Replace in: 27  Months     Data downloaded: Yes   Equipment and driveline assessed for damage: Yes     Back up : Serial number: [id number]  Back up battery: Patient use: 7 Replace in: 31  Months  Programmed settings identical to the settings on the primary controller :Yes      Education complete: Yes   Charge the BACKUP controller s backup battery every 6 months  Perform a self test on BACKUP every 6 months  Change the MPU s batteries every 6 months:Yes            2). ALARMS  Alarms reported by patient since last pump evaluation: Yes  Alarms or other finding noted during pump data history and alarm download: Yes; very frequent \"low flow\" alarms.  Patient had over 30 low flow alarms that ranged from 1.8-2.4lpm.  During low flow alarms, PIs were typically 11-12.  History screen only went back 3 hours. Patient reported feeling kind of spacey this morning but otherwise stated he felt well and denied dizziness.  Sent waveforms in, patient getting CTA chest today.  Will follow up with patient.     Action Taken:  Reviewed data with patient: Yes      3). DRESSING CHANGE / DRIVELINE ASSESSMENT  Dressing change completed today: No  Appearance of Driveline site: C/D/I per patient    Driveline stabilization: Method: [employer]  [ Teaching reinforced on need for stabilization of Driveline. ]    4).Pt. Education    D:  Pt education provided.  I:  Pt educated on the following topics:  1. When to call 911.  Reviewed emergency situations.  2. What to do if my VAD Coordinator is not returning my call.  3. When to page the VAD Coordinator on Call (handout provided w/ " frequent symptoms to report).  4. Pt practice paged the VAD Coordinator on Call.   5. Pt given page of stickers with the number for the VAD Coordinator on Call.  6. Pt given list of frequently used resources and their numbers.  Reviewed when to call each resource.  (Social Work, Financial Counselor, [facility], Device Nurse, ).  A:  Pt verbalized understanding.  P:  VAD Coordinator available for questions or concerns.  Will continue VAD education.

## 2019-01-30 NOTE — NURSING NOTE
Chief Complaint   Patient presents with     Follow Up     VAD Post implant F/U      Vitals were taken and medications were reconciled    Evette Garcia RMA  11:46 AM

## 2019-01-30 NOTE — PROGRESS NOTES
"HPI:   Mr. 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 Hospital course notable for mild RV dysfunction improved with dobutamine. He was discharged to TCU where he stayed for several days before he was discharged to home. Unfortunately, he presented to the ED after a mechanical fall and subsequent head injury with no LOC. Head CT was negative. He was seen in clinic last week with frequent low flow alarms. His diuretics were stopped and his anti-hypertensive regimen was increased. He returns for follow up.    Red reports the low flow alarms have decreased in frequency. He has noted the alarms occur when he is due for his hydralazine and decreases in frequency after taking his medications.    Despite the alarms, he has felt well. However, this morning he reports feeling \"out of it.\" He denies any focal deficits or changes, was oriented, just didn't feel like himself. No shortness of breath, sternal pain with cough, rare palpitations, no lightheadedness. He has been pushing fluid and notes a weight gain of roughty a pound per day.     Driveline site is slightly more tender than prior. No fevers or chills or discharge. He noted some pink tinged urine this morning. No melena or significant epistaxis. No focal deficits.     PAST MEDICAL HISTORY:  Past Medical History:   Diagnosis Date     Acute kidney injury (H)      CKD (chronic kidney disease)      Coronary artery disease      Diabetes mellitus (H)      HTN (hypertension)      Hyperlipidemia      Ischemic cardiomyopathy      Paroxysmal atrial flutter (H)      Systolic heart failure (H)      Ventricular tachycardia (H)        FAMILY HISTORY:  No family history on file.    SOCIAL HISTORY:  Social History     Social History     Marital status: " Single     Spouse name: N/A     Number of children: N/A     Years of education: N/A     Social History Main Topics     Smoking status: Never Smoker     Smokeless tobacco: Never Used     Alcohol use Not on file     Drug use: Not on file     Sexual activity: Not on file     Other Topics Concern     Not on file     Social History Narrative       CURRENT MEDICATIONS:  Outpatient Medications Prior to Visit   Medication Sig Dispense Refill     acetaminophen (TYLENOL) 325 MG tablet Take 2 tablets (650 mg) by mouth every 6 hours as needed for pain 1 Bottle 0     amiodarone (PACERONE/CODARONE) 200 MG tablet Take 1 tablet (200 mg) by mouth daily 30 tablet 0     aspirin (ASA) 81 MG chewable tablet Take 1 tablet (81 mg) by mouth daily 30 tablet 0     carvedilol (COREG) 3.125 MG tablet Take 1 tablet (3.125 mg) by mouth 2 times daily (with meals) 60 tablet 11     digoxin (LANOXIN) 125 MCG tablet Take 1 tablet (125 mcg) by mouth daily 30 tablet 0     doxycycline hyclate (VIBRAMYCIN) 100 MG capsule Take 1 capsule (100 mg) by mouth 2 times daily for 14 days 28 capsule 0     famotidine (PEPCID) 20 MG tablet Take 1 tablet (20 mg) by mouth 2 times daily 60 tablet 0     hydrALAZINE (APRESOLINE) 50 MG tablet Take 2 tablets (100 mg) by mouth 3 times daily 90 tablet 0     insulin aspart (NOVOLOG PEN) 100 UNIT/ML pen Inject 1-10 Units Subcutaneous 3 times daily (before meals) Correction Scale - HIGH INSULIN RESISTANCE DOSING     -164 =1 units.   -189 =2.   -214 =3.   -239 =4.   -264 =5.   -289 =6.   -314 =7.   -339 =8.   -364 =9.  BG greater than or equal to 365 give 10 units  To be given with meal insulin, based on pre-meal BG 9 mL 0     insulin aspart (NOVOLOG PEN) 100 UNIT/ML pen Inject 1-7 Units Subcutaneous At Bedtime HIGH INSULIN RESISTANCE DOSING    Bedtime  For  - 224 give 1 units.   For  - 249 give 2 units.   For  - 274 give 3 units.   For  - 299 give 4  units.   For  - 324 give 5 units.   For  - 349 give 6 units.   For BG greater than or equal to 350 give 7 units. 2.1 mL 0     insulin aspart (NOVOLOG PEN) 100 UNIT/ML pen Prandial Dosin units per 10 grams of carbohydrate each Meal or Snack.  Only chart total amount of units given.  Do not give if pre-prandial glucose is less than 60 mg/dL. If given at mealtime, administer within 30 minutes of start of meal.       insulin glargine (LANTUS SOLOSTAR PEN) 100 UNIT/ML pen Inject 20 Units Subcutaneous At Bedtime 6 mL 0     lisinopril (PRINIVIL/ZESTRIL) 2.5 MG tablet Take 5 mg by mouth daily 30 tablet 11     nitroGLYcerin (NITROSTAT) 0.4 MG sublingual tablet Place 1 tablet (0.4 mg) under the tongue every 5 minutes as needed for chest pain For chest pain place 1 tablet under the tongue every 5 minutes for 3 doses. If symptoms persist 5 minutes after 1st dose call 911. 10 tablet 0     rosuvastatin (CRESTOR) 20 MG tablet Take 1 tablet (20 mg) by mouth At Bedtime 30 tablet 0     senna-docusate (SENOKOT-S/PERICOLACE) 8.6-50 MG tablet Take 1 tablet by mouth daily as needed for constipation hold for loose stools 60 tablet 0     warfarin (COUMADIN) 2.5 MG tablet To take dose as directed by your INR clinic 200 tablet 1     polyethylene glycol (MIRALAX/GLYCOLAX) packet Take 17 g by mouth daily as needed for constipation (Patient not taking: Reported on 2019) 14 packet 0     No facility-administered medications prior to visit.      ROS:   CONSTITUTIONAL: Denies fever, chills, fatigue, or weight fluctuations.   HEENT: Denies headache and changes in speech. He complains of vision changes.   CV: Refer to HPI.   PULMONARY:Denies shortness of breath, cough, or previous TB exposure.   GI:Denies nausea, vomiting, diarrhea, and abdominal pain. Bowel movements are regular.   :Denies urinary alterations, dysuria, urinary frequency, hematuria, and abnormal drainage.   EXT:Denies lower extremity edema.   SKIN:Denies  "abnormal rashes or lesions.   MUSCULOSKELETAL:Denies upper or lower extremity weakness and pain.   NEUROLOGIC:Denies lightheadedness, dizziness, seizures, or upper or lower extremity paresthesia.     EXAM:  BP (!) 78/0 (BP Location: Left arm, Patient Position: Chair, Cuff Size: Adult Regular)   Pulse 68   Ht 1.626 m (5' 4\")   Wt 82.5 kg (181 lb 12.8 oz)   SpO2 99%   BMI 31.21 kg/m    GENERAL: Appears alert and oriented times three.   HEENT: Eye symmetrical and free of discharge bilaterally. Mucous membranes moist and without lesions.  NECK: Supple and without lymphadenopathy.   CV: pulsatile. Mechanical hum of LVAD. NO JVD  RESPIRATORY: Respirations regular, even, and unlabored. Lungs CTA throughout.   GI: Soft and non distended with normoactive bowel sounds present in all quadrants. No tenderness, rebound, guarding. No organomegaly.    EXTREMITIES: No peripheral edema.   NEUROLOGIC: Alert and orientated x 3. CN II-XII grossly intact. No focal deficits.   MUSCULOSKELETAL: No joint swelling or tenderness.   SKIN: No jaundice. No rashes or lesions.     VAD Interrogation on 1/30/19: VAD interrogation reviewed with VAD coordinator. Agree with findings. History is only 3 hours long due to almost constant low flow alarms and frequent PI events. No  power spikes or other findings suspicious of pump malfunction noted.     Labs:  CBC RESULTS:  Lab Results   Component Value Date    WBC 9.3 01/30/2019    RBC 4.02 (L) 01/30/2019    HGB 10.9 (L) 01/30/2019    HCT 35.9 (L) 01/30/2019    MCV 89 01/30/2019    MCH 27.1 01/30/2019    MCHC 30.4 (L) 01/30/2019    RDW 15.9 (H) 01/30/2019     01/30/2019       CMP RESULTS:  Lab Results   Component Value Date     01/23/2019    POTASSIUM 4.4 01/23/2019    CHLORIDE 109 01/23/2019    CO2 22 01/23/2019    ANIONGAP 9 01/23/2019     (H) 01/23/2019    BUN 33 (H) 01/23/2019    CR 1.41 (H) 01/23/2019    GFRESTIMATED 55 (L) 01/23/2019    GFRESTBLACK 64 01/23/2019    ARLENE 8.7 " 01/23/2019    BILITOTAL 0.4 01/23/2019    ALBUMIN 3.4 01/23/2019    ALKPHOS 81 01/23/2019    ALT 30 01/23/2019    AST 19 01/23/2019        INR RESULTS:  Lab Results   Component Value Date    INR 2.19 (H) 01/28/2019       Lab Results   Component Value Date    MAG 2.2 01/07/2019     Lab Results   Component Value Date    NTBNPI 1,177 (H) 01/10/2019     Lab Results   Component Value Date    NTBNP 686 (H) 01/23/2019      Cardiopulmonary Stress test 8/6/18  Walked 7 min 22 seconds. MVO2 12.5.  RER 1.1, VE/VCO2 slope: 48.24. /52 at rest, 105/59 exercise.    Heart catheterization 7/11/18 at OSH       Assessment and Plan:   Red is a pleasant 58 year old man s/p HM3 LVAD. He is having nearly constant low flow alarms. His labwork is reassuring. I suspect he needs some volume and more afterload reduction though his pressures are somewhat improved. We'll get a CT to assure he doesn't have an outflow kink. We've also downloaded his waveforms. [employer] engineers are likewise suspicous the issues are volume and afterload related. We'll increase lisinopril to 10 mg daily.    Red's urine sample is consistent with a UTI. He will start Bactrim DS x 7 days and follow with his PCP.     We'll repeat a chemistry this Friday to monitor his potassium and creatinine given the addition of bactrim and escalation of lisinopril. He'll also return for a nurse visit for BP check and device interrogation on Friday.    We lowered the hematocrit setting on his LVAD to decrease his alarm frequency and will keep this in mind in reviewing his device history.    35 minutes spent in direct care, >50% in counseling        CC  ALETHEA VOGT

## 2019-01-30 NOTE — LETTER
"1/30/2019      RE: Mariusz Sharp  [address] [facility] [address]       Dear Colleague,    Thank you for the opportunity to participate in the care of your patient, Mariusz Sharp, at the [facility] at [facility]. Please see a copy of my visit note below.    HPI:   Mr. 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 Hospital course notable for mild RV dysfunction improved with dobutamine. He was discharged to TCU where he stayed for several days before he was discharged to home. Unfortunately, he presented to the ED after a mechanical fall and subsequent head injury with no LOC. Head CT was negative. He was seen in clinic last week with frequent low flow alarms. His diuretics were stopped and his anti-hypertensive regimen was increased. He returns for follow up.    Red reports the low flow alarms have decreased in frequency. He has noted the alarms occur when he is due for his hydralazine and decreases in frequency after taking his medications.    Despite the alarms, he has felt well. However, this morning he reports feeling \"out of it.\" He denies any focal deficits or changes, was oriented, just didn't feel like himself. No shortness of breath, sternal pain with cough, rare palpitations, no lightheadedness. He has been pushing fluid and notes a weight gain of roughty a pound per day.     Driveline site is slightly more tender than prior. No fevers or chills or discharge. He noted some pink tinged urine this morning. No melena or significant epistaxis. No focal deficits.     PAST MEDICAL HISTORY:  Past Medical History:   Diagnosis Date     Acute kidney injury (H)      CKD (chronic kidney disease)      Coronary artery disease      Diabetes mellitus " (H)      HTN (hypertension)      Hyperlipidemia      Ischemic cardiomyopathy      Paroxysmal atrial flutter (H)      Systolic heart failure (H)      Ventricular tachycardia (H)        FAMILY HISTORY:  No family history on file.    SOCIAL HISTORY:  Social History     Social History     Marital status: Single     Spouse name: N/A     Number of children: N/A     Years of education: N/A     Social History Main Topics     Smoking status: Never Smoker     Smokeless tobacco: Never Used     Alcohol use Not on file     Drug use: Not on file     Sexual activity: Not on file     Other Topics Concern     Not on file     Social History Narrative       CURRENT MEDICATIONS:  Outpatient Medications Prior to Visit   Medication Sig Dispense Refill     acetaminophen (TYLENOL) 325 MG tablet Take 2 tablets (650 mg) by mouth every 6 hours as needed for pain 1 Bottle 0     amiodarone (PACERONE/CODARONE) 200 MG tablet Take 1 tablet (200 mg) by mouth daily 30 tablet 0     aspirin (ASA) 81 MG chewable tablet Take 1 tablet (81 mg) by mouth daily 30 tablet 0     carvedilol (COREG) 3.125 MG tablet Take 1 tablet (3.125 mg) by mouth 2 times daily (with meals) 60 tablet 11     digoxin (LANOXIN) 125 MCG tablet Take 1 tablet (125 mcg) by mouth daily 30 tablet 0     doxycycline hyclate (VIBRAMYCIN) 100 MG capsule Take 1 capsule (100 mg) by mouth 2 times daily for 14 days 28 capsule 0     famotidine (PEPCID) 20 MG tablet Take 1 tablet (20 mg) by mouth 2 times daily 60 tablet 0     hydrALAZINE (APRESOLINE) 50 MG tablet Take 2 tablets (100 mg) by mouth 3 times daily 90 tablet 0     insulin aspart (NOVOLOG PEN) 100 UNIT/ML pen Inject 1-10 Units Subcutaneous 3 times daily (before meals) Correction Scale - HIGH INSULIN RESISTANCE DOSING     -164 =1 units.   -189 =2.   -214 =3.   -239 =4.   -264 =5.   -289 =6.   -314 =7.   -339 =8.   -364 =9.  BG greater than or equal to 365 give 10 units  To be given  with meal insulin, based on pre-meal BG 9 mL 0     insulin aspart (NOVOLOG PEN) 100 UNIT/ML pen Inject 1-7 Units Subcutaneous At Bedtime HIGH INSULIN RESISTANCE DOSING    Bedtime  For  - 224 give 1 units.   For  - 249 give 2 units.   For  - 274 give 3 units.   For  - 299 give 4 units.   For  - 324 give 5 units.   For  - 349 give 6 units.   For BG greater than or equal to 350 give 7 units. 2.1 mL 0     insulin aspart (NOVOLOG PEN) 100 UNIT/ML pen Prandial Dosin units per 10 grams of carbohydrate each Meal or Snack.  Only chart total amount of units given.  Do not give if pre-prandial glucose is less than 60 mg/dL. If given at mealtime, administer within 30 minutes of start of meal.       insulin glargine (LANTUS SOLOSTAR PEN) 100 UNIT/ML pen Inject 20 Units Subcutaneous At Bedtime 6 mL 0     lisinopril (PRINIVIL/ZESTRIL) 2.5 MG tablet Take 5 mg by mouth daily 30 tablet 11     nitroGLYcerin (NITROSTAT) 0.4 MG sublingual tablet Place 1 tablet (0.4 mg) under the tongue every 5 minutes as needed for chest pain For chest pain place 1 tablet under the tongue every 5 minutes for 3 doses. If symptoms persist 5 minutes after 1st dose call 911. 10 tablet 0     rosuvastatin (CRESTOR) 20 MG tablet Take 1 tablet (20 mg) by mouth At Bedtime 30 tablet 0     senna-docusate (SENOKOT-S/PERICOLACE) 8.6-50 MG tablet Take 1 tablet by mouth daily as needed for constipation hold for loose stools 60 tablet 0     warfarin (COUMADIN) 2.5 MG tablet To take dose as directed by your INR clinic 200 tablet 1     polyethylene glycol (MIRALAX/GLYCOLAX) packet Take 17 g by mouth daily as needed for constipation (Patient not taking: Reported on 2019) 14 packet 0     No facility-administered medications prior to visit.      ROS:   CONSTITUTIONAL: Denies fever, chills, fatigue, or weight fluctuations.   HEENT: Denies headache and changes in speech. He complains of vision changes.   CV: Refer to HPI.  "  PULMONARY:Denies shortness of breath, cough, or previous TB exposure.   GI:Denies nausea, vomiting, diarrhea, and abdominal pain. Bowel movements are regular.   :Denies urinary alterations, dysuria, urinary frequency, hematuria, and abnormal drainage.   EXT:Denies lower extremity edema.   SKIN:Denies abnormal rashes or lesions.   MUSCULOSKELETAL:Denies upper or lower extremity weakness and pain.   NEUROLOGIC:Denies lightheadedness, dizziness, seizures, or upper or lower extremity paresthesia.     EXAM:  BP (!) 78/0 (BP Location: Left arm, Patient Position: Chair, Cuff Size: Adult Regular)   Pulse 68   Ht 1.626 m (5' 4\")   Wt 82.5 kg (181 lb 12.8 oz)   SpO2 99%   BMI 31.21 kg/m     GENERAL: Appears alert and oriented times three.   HEENT: Eye symmetrical and free of discharge bilaterally. Mucous membranes moist and without lesions.  NECK: Supple and without lymphadenopathy.   CV: pulsatile. Mechanical hum of LVAD. NO JVD  RESPIRATORY: Respirations regular, even, and unlabored. Lungs CTA throughout.   GI: Soft and non distended with normoactive bowel sounds present in all quadrants. No tenderness, rebound, guarding. No organomegaly.    EXTREMITIES: No peripheral edema.   NEUROLOGIC: Alert and orientated x 3. CN II-XII grossly intact. No focal deficits.   MUSCULOSKELETAL: No joint swelling or tenderness.   SKIN: No jaundice. No rashes or lesions.     VAD Interrogation on 1/30/19: VAD interrogation reviewed with VAD coordinator. Agree with findings. History is only 3 hours long due to almost constant low flow alarms and frequent PI events. No  power spikes or other findings suspicious of pump malfunction noted.     Labs:  CBC RESULTS:  Lab Results   Component Value Date    WBC 9.3 01/30/2019    RBC 4.02 (L) 01/30/2019    HGB 10.9 (L) 01/30/2019    HCT 35.9 (L) 01/30/2019    MCV 89 01/30/2019    MCH 27.1 01/30/2019    MCHC 30.4 (L) 01/30/2019    RDW 15.9 (H) 01/30/2019     01/30/2019       CMP " RESULTS:  Lab Results   Component Value Date     01/23/2019    POTASSIUM 4.4 01/23/2019    CHLORIDE 109 01/23/2019    CO2 22 01/23/2019    ANIONGAP 9 01/23/2019     (H) 01/23/2019    BUN 33 (H) 01/23/2019    CR 1.41 (H) 01/23/2019    GFRESTIMATED 55 (L) 01/23/2019    GFRESTBLACK 64 01/23/2019    ARLENE 8.7 01/23/2019    BILITOTAL 0.4 01/23/2019    ALBUMIN 3.4 01/23/2019    ALKPHOS 81 01/23/2019    ALT 30 01/23/2019    AST 19 01/23/2019        INR RESULTS:  Lab Results   Component Value Date    INR 2.19 (H) 01/28/2019       Lab Results   Component Value Date    MAG 2.2 01/07/2019     Lab Results   Component Value Date    NTBNPI 1,177 (H) 01/10/2019     Lab Results   Component Value Date    NTBNP 686 (H) 01/23/2019      Cardiopulmonary Stress test 8/6/18  Walked 7 min 22 seconds. MVO2 12.5.  RER 1.1, VE/VCO2 slope: 48.24. /52 at rest, 105/59 exercise.    Heart catheterization 7/11/18 at OSH       Assessment and Plan:   Red is a pleasant 58 year old man s/p HM3 LVAD. He is having nearly constant low flow alarms. His labwork is reassuring. I suspect he needs some volume and more afterload reduction though his pressures are somewhat improved. We'll get a CT to assure he doesn't have an outflow kink. We've also downloaded his waveforms. [employer] engineers are likewise suspicous the issues are volume and afterload related. We'll increase lisinopril to 10 mg daily.    Red's urine sample is consistent with a UTI. He will start Bactrim DS x 7 days and follow with his PCP.     We'll repeat a chemistry this Friday to monitor his potassium and creatinine given the addition of bactrim and escalation of lisinopril. He'll also return for a nurse visit for BP check and device interrogation on Friday.    We lowered the hematocrit setting on his LVAD to decrease his alarm frequency and will keep this in mind in reviewing his device history.    35 minutes spent in direct care, >50% in counseling    Meredith Garcia,  NP    ALETHEA VORA

## 2019-01-31 ENCOUNTER — CARE COORDINATION (OUTPATIENT)
Dept: CARDIOLOGY | Facility: CLINIC | Age: 59
End: 2019-01-31

## 2019-01-31 DIAGNOSIS — I50.22 CHRONIC SYSTOLIC CONGESTIVE HEART FAILURE (H): Primary | ICD-10-CM

## 2019-01-31 DIAGNOSIS — Z95.811 LVAD (LEFT VENTRICULAR ASSIST DEVICE) PRESENT (H): ICD-10-CM

## 2019-01-31 LAB
BACTERIA SPEC CULT: NO GROWTH
Lab: NORMAL
SPECIMEN SOURCE: NORMAL

## 2019-01-31 NOTE — PROGRESS NOTES
Addendum 1/31/19 Received in-basket message from Nedra LVAD Coordinator letting us know that pt is starting on 7 days of Bactrim for a upper respiratory infection. Pt is starting the Bactrim t francisco and writer advised him to take a 5mg dose of Warfarin today. Pt reports that he is scheduled for labs on Friday 2/1 and will get an INR then. Chasity Gonzalez RN

## 2019-01-31 NOTE — PROGRESS NOTES
Called patient/caregiver to check in 2 weeks post discharge. Pt reports VAD parameters - still having Low Flow alarms, but frequency seems to have decreased since clinic visit yesterday and weight stable. Reviewed medications and answered any questions. Patient reports sleeping ok and no anxiety since being home with LVAD. Patient is able to move around the house and care for himself independently.     Discussed specific new problems/stressors since being discharged from the hospital: none at this time. Empathized with patient and reviewed coping strategies: enlisting support from friends and love ones, attending patient and caregiver support groups, reviewing LVAD educational materials to reinforce knowledge, and talking about concerns with family/care providers/trusted others. Encouraged pt to page VAD Coordinator with any issues or questions. Pt verbalizes understanding.

## 2019-02-01 ENCOUNTER — ALLIED HEALTH/NURSE VISIT (OUTPATIENT)
Dept: CARDIOLOGY | Facility: CLINIC | Age: 59
End: 2019-02-01
Attending: INTERNAL MEDICINE
Payer: COMMERCIAL

## 2019-02-01 ENCOUNTER — CARE COORDINATION (OUTPATIENT)
Dept: CARDIOLOGY | Facility: CLINIC | Age: 59
End: 2019-02-01

## 2019-02-01 VITALS — DIASTOLIC BLOOD PRESSURE: 69 MMHG | SYSTOLIC BLOOD PRESSURE: 103 MMHG

## 2019-02-01 DIAGNOSIS — I50.22 CHRONIC SYSTOLIC CONGESTIVE HEART FAILURE (H): ICD-10-CM

## 2019-02-01 DIAGNOSIS — Z95.811 LVAD (LEFT VENTRICULAR ASSIST DEVICE) PRESENT (H): ICD-10-CM

## 2019-02-01 LAB
ALBUMIN SERPL-MCNC: 3.6 G/DL (ref 3.4–5)
ALP SERPL-CCNC: 72 U/L (ref 40–150)
ALT SERPL W P-5'-P-CCNC: 25 U/L (ref 0–70)
ANION GAP SERPL CALCULATED.3IONS-SCNC: 7 MMOL/L (ref 3–14)
AST SERPL W P-5'-P-CCNC: 23 U/L (ref 0–45)
BILIRUB SERPL-MCNC: 0.6 MG/DL (ref 0.2–1.3)
BUN SERPL-MCNC: 33 MG/DL (ref 7–30)
CALCIUM SERPL-MCNC: 8.7 MG/DL (ref 8.5–10.1)
CHLORIDE SERPL-SCNC: 108 MMOL/L (ref 94–109)
CO2 SERPL-SCNC: 22 MMOL/L (ref 20–32)
CREAT SERPL-MCNC: 1.62 MG/DL (ref 0.66–1.25)
ERYTHROCYTE [DISTWIDTH] IN BLOOD BY AUTOMATED COUNT: 16.4 % (ref 10–15)
GFR SERPL CREATININE-BSD FRML MDRD: 47 ML/MIN/{1.73_M2}
GLUCOSE SERPL-MCNC: 166 MG/DL (ref 70–99)
HCT VFR BLD AUTO: 33.1 % (ref 40–53)
HGB BLD-MCNC: 10.3 G/DL (ref 13.3–17.7)
MCH RBC QN AUTO: 28 PG (ref 26.5–33)
MCHC RBC AUTO-ENTMCNC: 31.1 G/DL (ref 31.5–36.5)
MCV RBC AUTO: 90 FL (ref 78–100)
PLATELET # BLD AUTO: 245 10E9/L (ref 150–450)
POTASSIUM SERPL-SCNC: 4.5 MMOL/L (ref 3.4–5.3)
PROT SERPL-MCNC: 7.2 G/DL (ref 6.8–8.8)
RBC # BLD AUTO: 3.68 10E12/L (ref 4.4–5.9)
SODIUM SERPL-SCNC: 137 MMOL/L (ref 133–144)
WBC # BLD AUTO: 8 10E9/L (ref 4–11)

## 2019-02-01 PROCEDURE — 36415 COLL VENOUS BLD VENIPUNCTURE: CPT | Performed by: NURSE PRACTITIONER

## 2019-02-01 PROCEDURE — 85027 COMPLETE CBC AUTOMATED: CPT | Performed by: NURSE PRACTITIONER

## 2019-02-01 PROCEDURE — 80053 COMPREHEN METABOLIC PANEL: CPT | Performed by: NURSE PRACTITIONER

## 2019-02-01 RX ORDER — CARVEDILOL 3.12 MG/1
6.25 TABLET ORAL 2 TIMES DAILY WITH MEALS
Qty: 60 TABLET | Refills: 11 | COMMUNITY
Start: 2019-02-01 | End: 2019-02-11

## 2019-02-01 NOTE — NURSING NOTE
1). PUMP DATA  Primary controller serial number: [id number]     3:   Flow: 3.6 L/min,    Speed: 5300 RPMs,     PI: 7.1 ,  Power: 4 Parker, Hct: set artificially low avoid low flow alarms     Primary controller   Back up battery: Patient use: 11, Replace in: 26  Months     Data downloaded: No   Equipment and driveline assessed for damage: Yes     2). ALARMS  Alarms reported by patient since last pump evaluation: Yes a few low flow alarms last night at 11pm and yesterday morning at 11am  Alarms or other finding noted during pump data history and alarm download: Yes - near continuous PI events. Some accompanied with speed drops to 5150. PI range 3-7.9    Action Taken:  Reviewed data with patient: Yes    Reviewed findings with Meredith Garcia NP. Received instructions to increase Coreg to 6.25mg BID and recheck BP and labs next week. Reviewed instructions with pt, who verbalized understanding. Pt will return to clinic on 2/6 and call if increase in alarms, lightheadedness/dizziness.

## 2019-02-01 NOTE — PROGRESS NOTES
Pt called to report that he is, in fact, taking Coreg. He also reports that he has not had any low flow alarms since about 2300 yesterday. He was encouraged to call the on-call VAD Coordinator with any questions or concerns. He verbalized understanding.

## 2019-02-01 NOTE — PATIENT INSTRUCTIONS
Chem, UA and BP check next week  Increase Coreg to 6.25mg BID  Call with increased alarms, dizziness, lightheadedness

## 2019-02-04 ENCOUNTER — ANTICOAGULATION THERAPY VISIT (OUTPATIENT)
Dept: ANTICOAGULATION | Facility: CLINIC | Age: 59
End: 2019-02-04

## 2019-02-04 DIAGNOSIS — I50.23 ACUTE ON CHRONIC SYSTOLIC HEART FAILURE (H): ICD-10-CM

## 2019-02-04 DIAGNOSIS — I50.22 CHRONIC SYSTOLIC CONGESTIVE HEART FAILURE (H): ICD-10-CM

## 2019-02-04 DIAGNOSIS — Z95.811 LVAD (LEFT VENTRICULAR ASSIST DEVICE) PRESENT (H): ICD-10-CM

## 2019-02-04 DIAGNOSIS — Z79.01 LONG TERM (CURRENT) USE OF ANTICOAGULANTS: ICD-10-CM

## 2019-02-04 LAB
ALBUMIN UR-MCNC: 30 MG/DL
ANION GAP SERPL CALCULATED.3IONS-SCNC: 7 MMOL/L (ref 3–14)
APPEARANCE UR: ABNORMAL
BILIRUB UR QL STRIP: NEGATIVE
BUN SERPL-MCNC: 33 MG/DL (ref 7–30)
CALCIUM SERPL-MCNC: 8.8 MG/DL (ref 8.5–10.1)
CHLORIDE SERPL-SCNC: 108 MMOL/L (ref 94–109)
CO2 SERPL-SCNC: 22 MMOL/L (ref 20–32)
COLOR UR AUTO: YELLOW
CREAT SERPL-MCNC: 1.49 MG/DL (ref 0.66–1.25)
GFR SERPL CREATININE-BSD FRML MDRD: 52 ML/MIN/{1.73_M2}
GLUCOSE SERPL-MCNC: 196 MG/DL (ref 70–99)
GLUCOSE UR STRIP-MCNC: 50 MG/DL
HGB UR QL STRIP: ABNORMAL
HYALINE CASTS #/AREA URNS LPF: 2 /LPF (ref 0–2)
INR PPP: 3.96 (ref 0.86–1.14)
KETONES UR STRIP-MCNC: NEGATIVE MG/DL
LEUKOCYTE ESTERASE UR QL STRIP: NEGATIVE
MUCOUS THREADS #/AREA URNS LPF: PRESENT /LPF
NITRATE UR QL: NEGATIVE
PH UR STRIP: 5 PH (ref 5–7)
POTASSIUM SERPL-SCNC: 4.6 MMOL/L (ref 3.4–5.3)
RBC #/AREA URNS AUTO: >182 /HPF (ref 0–2)
SODIUM SERPL-SCNC: 137 MMOL/L (ref 133–144)
SOURCE: ABNORMAL
SP GR UR STRIP: 1.02 (ref 1–1.03)
UROBILINOGEN UR STRIP-MCNC: 0 MG/DL (ref 0–2)
WBC #/AREA URNS AUTO: 6 /HPF (ref 0–5)

## 2019-02-04 NOTE — PROGRESS NOTES
ANTICOAGULATION FOLLOW-UP CLINIC VISIT    Patient Name:  Mariusz Sharp  Date:  2/4/2019  Contact Type:  Telephone    SUBJECTIVE:     Patient Findings     Positives:   Antibiotic use or infection (bactrim through 2/13)           OBJECTIVE    INR   Date Value Ref Range Status   02/04/2019 3.96 (H) 0.86 - 1.14 Final     Chromogenic Factor 10   Date Value Ref Range Status   01/11/2019 72 70 - 130 % Final     Comment:     Therapeutic Range:  A Chromogenic Factor 10 level of approximately 20-40%   inversely correlates with an INR of 2-3 for patients receiving Warfarin.   Chromogenic Factor 10 levels below 20% indicate an INR greater than 3 and   levels above 40% indicate an INR less than 2.         ASSESSMENT / PLAN  INR assessment SUPRA    Recheck INR In: 3 DAYS    INR Location Clinic      Anticoagulation Summary  As of 2/4/2019    INR goal:   2.0-3.0   TTR:   44.9 % (2 wk)   INR used for dosing:   3.96! (2/4/2019)   Warfarin maintenance plan:   No maintenance plan   Full warfarin instructions:   2/4: 2.5 mg; 2/5: 2.5 mg; 2/6: 7.5 mg   Plan last modified:   Chasity Gonzalez RN (1/17/2019)   Next INR check:   2/7/2019   Priority:   INR   Target end date:   Indefinite    Indications    Heart failure (H) [I50.9]  LVAD (left ventricular assist device) present (H) [Z95.811]             Anticoagulation Episode Summary     INR check location:       Preferred lab:       Send INR reminders to:   [facility]    Comments:   LVAD implanted 12/31 ASA 81mg Daily Has 2.5mg tablets   1/17/19 Staying at [facility]        Anticoagulation Care Providers     Provider Role Specialty Phone number    Jenni Ortiz MD Responsible Cardiology [phone]            See the Encounter Report to view Anticoagulation Flowsheet and Dosing Calendar (Go to Encounters tab in chart review, and find the Anticoagulation Therapy Visit)  Consulted our pharmacist, Juni Soto, and spoke with patient.    Nasreen Kathleen, GERMAN        Addendum  2/7/19 Patient was in today to have his INR done but it wasn't drawn. Patient will take 7.5mg of warfarin tonight. [facility]

## 2019-02-06 DIAGNOSIS — Z95.811 LVAD (LEFT VENTRICULAR ASSIST DEVICE) PRESENT (H): Primary | ICD-10-CM

## 2019-02-07 ENCOUNTER — OFFICE VISIT (OUTPATIENT)
Dept: CARDIOLOGY | Facility: CLINIC | Age: 59
End: 2019-02-07
Attending: INTERNAL MEDICINE
Payer: COMMERCIAL

## 2019-02-07 ENCOUNTER — CARE COORDINATION (OUTPATIENT)
Dept: CARDIOLOGY | Facility: CLINIC | Age: 59
End: 2019-02-07

## 2019-02-07 VITALS
SYSTOLIC BLOOD PRESSURE: 78 MMHG | BODY MASS INDEX: 31.16 KG/M2 | HEART RATE: 63 BPM | WEIGHT: 182.5 LBS | OXYGEN SATURATION: 98 % | TEMPERATURE: 98.1 F | HEIGHT: 64 IN

## 2019-02-07 DIAGNOSIS — Z95.811 LVAD (LEFT VENTRICULAR ASSIST DEVICE) PRESENT (H): ICD-10-CM

## 2019-02-07 DIAGNOSIS — Z95.811 LVAD (LEFT VENTRICULAR ASSIST DEVICE) PRESENT (H): Primary | ICD-10-CM

## 2019-02-07 DIAGNOSIS — I50.22 CHRONIC SYSTOLIC CONGESTIVE HEART FAILURE (H): ICD-10-CM

## 2019-02-07 LAB
ANION GAP SERPL CALCULATED.3IONS-SCNC: 9 MMOL/L (ref 3–14)
BUN SERPL-MCNC: 34 MG/DL (ref 7–30)
CALCIUM SERPL-MCNC: 8.5 MG/DL (ref 8.5–10.1)
CHLORIDE SERPL-SCNC: 108 MMOL/L (ref 94–109)
CO2 SERPL-SCNC: 21 MMOL/L (ref 20–32)
CREAT SERPL-MCNC: 1.49 MG/DL (ref 0.66–1.25)
GFR SERPL CREATININE-BSD FRML MDRD: 52 ML/MIN/{1.73_M2}
GLUCOSE SERPL-MCNC: 196 MG/DL (ref 70–99)
POTASSIUM SERPL-SCNC: 4.9 MMOL/L (ref 3.4–5.3)
SODIUM SERPL-SCNC: 138 MMOL/L (ref 133–144)

## 2019-02-07 PROCEDURE — 36415 COLL VENOUS BLD VENIPUNCTURE: CPT | Performed by: NURSE PRACTITIONER

## 2019-02-07 PROCEDURE — 80048 BASIC METABOLIC PNL TOTAL CA: CPT | Performed by: NURSE PRACTITIONER

## 2019-02-07 ASSESSMENT — MIFFLIN-ST. JEOR: SCORE: 1568.81

## 2019-02-07 ASSESSMENT — PAIN SCALES - GENERAL: PAINLEVEL: NO PAIN (0)

## 2019-02-07 NOTE — PROGRESS NOTES
1). PUMP DATA  Primary controller serial number: [id number]    HM 3:   Flow: 4 L/min,    Speed: 5300 RPMs,     PI: 4.6 ,  Power: 3.8 Parker     Primary controller   Back up battery: Patient use: 11, Replace in: 26  Months     Data downloaded: No   Equipment and driveline assessed for damage: Yes     ALARMS  Alarms reported by patient since last pump evaluation: Yes - low flow alarm yesterday morning, but prior to that no alarms for 3 days.   Alarms or other finding noted during pump data history and alarm download: Yes - nearly continuous PI events, with PI ranging 1.9-9.7. Rare speed drops to 5150. Pt reports occasional dizziness with position changes. Resolves within 10-20 seconds.   Asked pt to stand while watching system monitor, upon standing, PI dropped from 4.8-2.5 over approx 5-10 seconds, but rebounded quickly. Pt denied dizziness.     Action Taken:  Reviewed data with patient: Yes    Reviewed findings with Meredith Garcia NP. Pt likey needs more PO fluids. Pt reports he is taking about 1.5L daily - encouraged pt to increase to 2L daily. Pt verbalized understanding.     Will return to clinic on Monday, 2/11.

## 2019-02-07 NOTE — NURSING NOTE
Chief Complaint   Patient presents with     Allied Health Visit     BP check     Vitals were taken and medications were reconciled.     Kristine Garcia CMA    12:20 PM

## 2019-02-07 NOTE — LETTER
2/7/2019      RE: Mariusz Sharp  [address] [place] [id number]       Dear Colleague,    Thank you for the opportunity to participate in the care of your patient, Mariusz Sharp, at the [facility] at [facility]. Please see a copy of my visit note below.    .    Please do not hesitate to contact me if you have any questions/concerns.     Sincerely,      CVC CMA

## 2019-02-07 NOTE — PROGRESS NOTES
Saw patient/caregiver in clinic to check in 3 weeks post discharge. Pt reports VAD parameters WNL (1 low flow in the last 24hr) and weight stable. Reviewed medications and answered any questions. Patient reports sleeping well and no anxiety since being home with LVAD. Patient is able to move around the house and care for himself independently.    Discussed specific new problems/stressors since being discharged from the hospital: none at this time. Empathized with patient and reviewed coping strategies: enlisting support from friends and love ones, attending patient and caregiver support groups, reviewing LVAD educational materials to reinforce knowledge, and talking about concerns with family/care providers/trusted others. Encouraged pt to page VAD Coordinator with any issues or questions. Pt verbalizes understanding.

## 2019-02-08 ENCOUNTER — ANTICOAGULATION THERAPY VISIT (OUTPATIENT)
Dept: ANTICOAGULATION | Facility: CLINIC | Age: 59
End: 2019-02-08

## 2019-02-08 DIAGNOSIS — I50.22 CHRONIC SYSTOLIC CONGESTIVE HEART FAILURE (H): Primary | ICD-10-CM

## 2019-02-08 DIAGNOSIS — I50.23 ACUTE ON CHRONIC SYSTOLIC HEART FAILURE (H): ICD-10-CM

## 2019-02-08 DIAGNOSIS — Z95.811 LVAD (LEFT VENTRICULAR ASSIST DEVICE) PRESENT (H): ICD-10-CM

## 2019-02-08 DIAGNOSIS — Z79.01 LONG TERM (CURRENT) USE OF ANTICOAGULANTS: ICD-10-CM

## 2019-02-08 LAB — INR PPP: 2.47 (ref 0.86–1.14)

## 2019-02-08 NOTE — PROGRESS NOTES
ANTICOAGULATION FOLLOW-UP CLINIC VISIT    Patient Name:  Mariusz Sharp  Date:  2019  Contact Type:  Telephone    SUBJECTIVE:     Patient Findings     Comments:   Patient stopped Bactrim yesterday.            OBJECTIVE    INR   Date Value Ref Range Status   2019 2.47 (H) 0.86 - 1.14 Final     Chromogenic Factor 10   Date Value Ref Range Status   2019 72 70 - 130 % Final     Comment:     Therapeutic Range:  A Chromogenic Factor 10 level of approximately 20-40%   inversely correlates with an INR of 2-3 for patients receiving Warfarin.   Chromogenic Factor 10 levels below 20% indicate an INR greater than 3 and   levels above 40% indicate an INR less than 2.         ASSESSMENT / PLAN  No question data found.  Anticoagulation Summary  As of 2019    INR goal:   2.0-3.0   TTR:   42.9 % (2.6 wk)   INR used for dosin.47 (2019)   Warfarin maintenance plan:   No maintenance plan   Full warfarin instructions:   : 7.5 mg; : 5 mg; 2/10: 7.5 mg   Plan last modified:   Chasity Gonzalez RN (2019)   Next INR check:   2019   Priority:   INR   Target end date:   Indefinite    Indications    Heart failure (H) [I50.9]  LVAD (left ventricular assist device) present (H) [Z95.811]             Anticoagulation Episode Summary     INR check location:       Preferred lab:       Send INR reminders to:   [facility]    Comments:   LVAD implanted  ASA 81mg Daily Has 2.5mg tablets   19 Staying at [facility]        Anticoagulation Care Providers     Provider Role Specialty Phone number    Jenni Ortiz MD [facility] Cardiology [phone]            See the Encounter Report to view Anticoagulation Flowsheet and Dosing Calendar (Go to Encounters tab in chart review, and find the Anticoagulation Therapy Visit)    Spoke with patient.     Sophy Griggs RN

## 2019-02-11 ENCOUNTER — OFFICE VISIT (OUTPATIENT)
Dept: CARDIOLOGY | Facility: CLINIC | Age: 59
End: 2019-02-11
Attending: INTERNAL MEDICINE
Payer: COMMERCIAL

## 2019-02-11 ENCOUNTER — ANTICOAGULATION THERAPY VISIT (OUTPATIENT)
Dept: ANTICOAGULATION | Facility: CLINIC | Age: 59
End: 2019-02-11

## 2019-02-11 VITALS
OXYGEN SATURATION: 96 % | HEART RATE: 60 BPM | SYSTOLIC BLOOD PRESSURE: 98 MMHG | HEIGHT: 64 IN | WEIGHT: 184.9 LBS | BODY MASS INDEX: 31.57 KG/M2

## 2019-02-11 DIAGNOSIS — N39.0 URINARY TRACT INFECTION: Primary | ICD-10-CM

## 2019-02-11 DIAGNOSIS — I50.22 CHRONIC SYSTOLIC CONGESTIVE HEART FAILURE (H): Primary | ICD-10-CM

## 2019-02-11 DIAGNOSIS — I50.22 CHRONIC SYSTOLIC CONGESTIVE HEART FAILURE (H): ICD-10-CM

## 2019-02-11 DIAGNOSIS — N39.0 URINARY TRACT INFECTION: ICD-10-CM

## 2019-02-11 DIAGNOSIS — I50.23 ACUTE ON CHRONIC SYSTOLIC HEART FAILURE (H): ICD-10-CM

## 2019-02-11 DIAGNOSIS — Z95.811 LVAD (LEFT VENTRICULAR ASSIST DEVICE) PRESENT (H): ICD-10-CM

## 2019-02-11 DIAGNOSIS — Z95.811 LVAD (LEFT VENTRICULAR ASSIST DEVICE) PRESENT (H): Primary | ICD-10-CM

## 2019-02-11 DIAGNOSIS — I50.9 HF (HEART FAILURE) (H): ICD-10-CM

## 2019-02-11 DIAGNOSIS — I50.21 ACUTE SYSTOLIC HEART FAILURE (H): ICD-10-CM

## 2019-02-11 DIAGNOSIS — N30.01 ACUTE CYSTITIS WITH HEMATURIA: Primary | ICD-10-CM

## 2019-02-11 LAB
ALBUMIN SERPL-MCNC: 3.5 G/DL (ref 3.4–5)
ALBUMIN UR-MCNC: 30 MG/DL
ALP SERPL-CCNC: 68 U/L (ref 40–150)
ALT SERPL W P-5'-P-CCNC: 32 U/L (ref 0–70)
ANION GAP SERPL CALCULATED.3IONS-SCNC: 7 MMOL/L (ref 3–14)
APPEARANCE UR: ABNORMAL
AST SERPL W P-5'-P-CCNC: 20 U/L (ref 0–45)
BILIRUB SERPL-MCNC: 0.5 MG/DL (ref 0.2–1.3)
BILIRUB UR QL STRIP: NEGATIVE
BUN SERPL-MCNC: 37 MG/DL (ref 7–30)
CALCIUM SERPL-MCNC: 8.5 MG/DL (ref 8.5–10.1)
CHLORIDE SERPL-SCNC: 109 MMOL/L (ref 94–109)
CO2 SERPL-SCNC: 21 MMOL/L (ref 20–32)
COLOR UR AUTO: YELLOW
CREAT SERPL-MCNC: 1.71 MG/DL (ref 0.66–1.25)
ERYTHROCYTE [DISTWIDTH] IN BLOOD BY AUTOMATED COUNT: 16.4 % (ref 10–15)
GFR SERPL CREATININE-BSD FRML MDRD: 44 ML/MIN/{1.73_M2}
GLUCOSE SERPL-MCNC: 224 MG/DL (ref 70–99)
GLUCOSE UR STRIP-MCNC: >499 MG/DL
HCT VFR BLD AUTO: 33.2 % (ref 40–53)
HGB BLD-MCNC: 10.4 G/DL (ref 13.3–17.7)
HGB UR QL STRIP: ABNORMAL
INR PPP: 2.51 (ref 0.86–1.14)
KETONES UR STRIP-MCNC: NEGATIVE MG/DL
LDH SERPL L TO P-CCNC: 123 U/L (ref 85–227)
LEUKOCYTE ESTERASE UR QL STRIP: NEGATIVE
MCH RBC QN AUTO: 28.5 PG (ref 26.5–33)
MCHC RBC AUTO-ENTMCNC: 31.3 G/DL (ref 31.5–36.5)
MCV RBC AUTO: 91 FL (ref 78–100)
MUCOUS THREADS #/AREA URNS LPF: PRESENT /LPF
NITRATE UR QL: NEGATIVE
PH UR STRIP: 5 PH (ref 5–7)
PLATELET # BLD AUTO: 250 10E9/L (ref 150–450)
POTASSIUM SERPL-SCNC: 4.9 MMOL/L (ref 3.4–5.3)
PROT SERPL-MCNC: 7 G/DL (ref 6.8–8.8)
RBC # BLD AUTO: 3.65 10E12/L (ref 4.4–5.9)
RBC #/AREA URNS AUTO: >182 /HPF (ref 0–2)
SODIUM SERPL-SCNC: 137 MMOL/L (ref 133–144)
SOURCE: ABNORMAL
SP GR UR STRIP: 1.01 (ref 1–1.03)
UROBILINOGEN UR STRIP-MCNC: 0 MG/DL (ref 0–2)
WBC # BLD AUTO: 7.1 10E9/L (ref 4–11)
WBC #/AREA URNS AUTO: 4 /HPF (ref 0–5)

## 2019-02-11 PROCEDURE — G0463 HOSPITAL OUTPT CLINIC VISIT: HCPCS | Mod: 25,ZF

## 2019-02-11 PROCEDURE — 85610 PROTHROMBIN TIME: CPT | Performed by: NURSE PRACTITIONER

## 2019-02-11 PROCEDURE — 81001 URINALYSIS AUTO W/SCOPE: CPT | Performed by: NURSE PRACTITIONER

## 2019-02-11 PROCEDURE — 83615 LACTATE (LD) (LDH) ENZYME: CPT | Performed by: NURSE PRACTITIONER

## 2019-02-11 PROCEDURE — 87086 URINE CULTURE/COLONY COUNT: CPT | Performed by: NURSE PRACTITIONER

## 2019-02-11 PROCEDURE — 80053 COMPREHEN METABOLIC PANEL: CPT | Performed by: NURSE PRACTITIONER

## 2019-02-11 PROCEDURE — 36415 COLL VENOUS BLD VENIPUNCTURE: CPT | Performed by: NURSE PRACTITIONER

## 2019-02-11 PROCEDURE — 85027 COMPLETE CBC AUTOMATED: CPT | Performed by: NURSE PRACTITIONER

## 2019-02-11 PROCEDURE — 99214 OFFICE O/P EST MOD 30 MIN: CPT | Mod: 25 | Performed by: NURSE PRACTITIONER

## 2019-02-11 PROCEDURE — 93750 INTERROGATION VAD IN PERSON: CPT | Mod: ZF | Performed by: NURSE PRACTITIONER

## 2019-02-11 RX ORDER — CARVEDILOL 3.12 MG/1
6.25 TABLET ORAL 2 TIMES DAILY WITH MEALS
Qty: 60 TABLET | Refills: 11 | Status: SHIPPED | OUTPATIENT
Start: 2019-02-11 | End: 2019-05-01

## 2019-02-11 RX ORDER — ISOSORBIDE MONONITRATE 30 MG/1
30 TABLET, EXTENDED RELEASE ORAL DAILY
Qty: 90 TABLET | Refills: 3 | Status: SHIPPED | OUTPATIENT
Start: 2019-02-11 | End: 2019-02-20

## 2019-02-11 RX ORDER — WARFARIN SODIUM 2.5 MG/1
TABLET ORAL
Qty: 270 TABLET | Refills: 3 | Status: SHIPPED | OUTPATIENT
Start: 2019-02-11 | End: 2019-02-11

## 2019-02-11 RX ORDER — FAMOTIDINE 20 MG/1
20 TABLET, FILM COATED ORAL 2 TIMES DAILY
Qty: 180 TABLET | Refills: 3 | Status: SHIPPED | OUTPATIENT
Start: 2019-02-11 | End: 2020-03-11

## 2019-02-11 RX ORDER — ROSUVASTATIN CALCIUM 20 MG/1
20 TABLET, COATED ORAL AT BEDTIME
Qty: 90 TABLET | Refills: 3 | Status: SHIPPED | OUTPATIENT
Start: 2019-02-11 | End: 2019-02-11

## 2019-02-11 RX ORDER — AMIODARONE HYDROCHLORIDE 200 MG/1
200 TABLET ORAL DAILY
Qty: 90 TABLET | Refills: 3 | Status: SHIPPED | OUTPATIENT
Start: 2019-02-11 | End: 2019-02-11

## 2019-02-11 RX ORDER — WARFARIN SODIUM 2.5 MG/1
TABLET ORAL
Qty: 250 TABLET | Refills: 1 | Status: SHIPPED | OUTPATIENT
Start: 2019-02-11 | End: 2019-03-18 | Stop reason: DRUGHIGH

## 2019-02-11 RX ORDER — FAMOTIDINE 20 MG/1
20 TABLET, FILM COATED ORAL 2 TIMES DAILY
Qty: 180 TABLET | Refills: 3 | Status: SHIPPED | OUTPATIENT
Start: 2019-02-11 | End: 2019-02-11

## 2019-02-11 RX ORDER — ASPIRIN 81 MG/1
81 TABLET, CHEWABLE ORAL DAILY
Qty: 90 TABLET | Refills: 3 | Status: ON HOLD | OUTPATIENT
Start: 2019-02-11 | End: 2020-09-24

## 2019-02-11 RX ORDER — ROSUVASTATIN CALCIUM 20 MG/1
20 TABLET, COATED ORAL AT BEDTIME
Qty: 90 TABLET | Refills: 3 | Status: SHIPPED | OUTPATIENT
Start: 2019-02-11 | End: 2020-02-28

## 2019-02-11 RX ORDER — WARFARIN SODIUM 2.5 MG/1
TABLET ORAL
Qty: 270 TABLET | Refills: 3 | Status: SHIPPED | OUTPATIENT
Start: 2019-02-11 | End: 2019-03-18 | Stop reason: DRUGHIGH

## 2019-02-11 RX ORDER — DIGOXIN 125 MCG
125 TABLET ORAL DAILY
Qty: 90 TABLET | Refills: 3 | Status: SHIPPED | OUTPATIENT
Start: 2019-02-11 | End: 2019-02-11

## 2019-02-11 RX ORDER — DIGOXIN 125 MCG
125 TABLET ORAL DAILY
Qty: 90 TABLET | Refills: 3 | Status: SHIPPED | OUTPATIENT
Start: 2019-02-11 | End: 2019-09-13

## 2019-02-11 RX ORDER — AMIODARONE HYDROCHLORIDE 200 MG/1
200 TABLET ORAL DAILY
Qty: 90 TABLET | Refills: 3 | Status: SHIPPED | OUTPATIENT
Start: 2019-02-11 | End: 2019-12-04

## 2019-02-11 ASSESSMENT — PAIN SCALES - GENERAL: PAINLEVEL: NO PAIN (0)

## 2019-02-11 ASSESSMENT — MIFFLIN-ST. JEOR: SCORE: 1579.7

## 2019-02-11 NOTE — NURSING NOTE
1). PUMP DATA  Primary controller serial number: [id number]    HM 3:   Flow: 3.1 L/min,    Speed: 5.7 RPMs,     PI: 5.7 ,  Power: 3.8 Parker, Hct: 20     Primary controller   Back up battery: Patient use: 11, Replace in: 26  Months     Data downloaded: No   Equipment and driveline assessed for damage: Yes     Back up : Serial number: [id number]  Back up battery: Patient use: 7 Replace in: 30  Months  Programmed settings identical to the settings on the primary controller :NA      Education complete: Yes   Charge the BACKUP controller s backup battery every 6 months  Perform a self test on BACKUP every 6 months  Change the MPU s batteries every 6 months:Yes      2). ALARMS  Alarms reported by patient since last pump evaluation: Yes  Alarms or other finding noted during pump data history and alarm download: Pt had 7 low flow alarms this am.  2.1-2.5.  Also having frequent PI events.  2.1-10.        Action Taken:  Reviewed data with patient: Yes      3). DRESSING CHANGE / DRIVELINE ASSESSMENT  Dressing change completed today: No  Appearance of Driveline site: Pt reports redness that is improving.    Driveline stabilization: Method: [employer]  [ Teaching reinforced on need for stabilization of Driveline. ]

## 2019-02-11 NOTE — PROGRESS NOTES
ANTICOAGULATION FOLLOW-UP CLINIC VISIT    Patient Name:  Mariusz Sharp  Date:  2019  Contact Type:  Telephone    SUBJECTIVE:        OBJECTIVE    INR   Date Value Ref Range Status   2019 2.51 (H) 0.86 - 1.14 Final     Chromogenic Factor 10   Date Value Ref Range Status   2019 72 70 - 130 % Final     Comment:     Therapeutic Range:  A Chromogenic Factor 10 level of approximately 20-40%   inversely correlates with an INR of 2-3 for patients receiving Warfarin.   Chromogenic Factor 10 levels below 20% indicate an INR greater than 3 and   levels above 40% indicate an INR less than 2.         ASSESSMENT / PLAN  No question data found.  Anticoagulation Summary  As of 2019    INR goal:   2.0-3.0   TTR:   51.4 % (3 wk)   INR used for dosin.51 (2019)   Warfarin maintenance plan:   No maintenance plan   Full warfarin instructions:   : 7.5 mg; : 5 mg; : 7.5 mg   Plan last modified:   Chasity Gonzalez RN (2019)   Next INR check:   2019   Priority:   INR   Target end date:   Indefinite    Indications    Heart failure (H) [I50.9]  LVAD (left ventricular assist device) present (H) [Z95.811]             Anticoagulation Episode Summary     INR check location:       Preferred lab:       Send INR reminders to:   [facility]    Comments:   LVAD implanted  ASA 81mg Daily Has 2.5mg tablets   19 Staying at [facility]        Anticoagulation Care Providers     Provider Role Specialty Phone number    Jenni Ortiz MD [facility] Cardiology [phone]            See the Encounter Report to view Anticoagulation Flowsheet and Dosing Calendar (Go to Encounters tab in chart review, and find the Anticoagulation Therapy Visit)    Spoke with patient.     Sophy Griggs RN

## 2019-02-11 NOTE — PROGRESS NOTES
HPI:   Mr. 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 Hospital course notable for mild RV dysfunction improved with dobutamine. He was discharged to TCU where he stayed for several days before he was discharged to home. Unfortunately, he presented to the ED after a mechanical fall and subsequent head injury with no LOC. Head CT was negative. He has been seen several times with low flow alarms that appear to be related to afterload and volume status. We've been escalating his afterload reduction and adjusted the hematocrit setting on his LVAD to decrease the frequency of low flow alarms. CT image of his outflow graft was negative for kinking. He also had a recent UTI treated with a 7 day course of Bactrim DS. He presents for follow up.    Red had several lightheadedness episodes over the weekend, once with orthostatic position changes in the late evening and once an hour after morning medicationss while walking around.   No sob, chest pain, rare palpitations, minimal LE edema, no anorexia.     Red's exit site is without redness, discharge, or tenderness. No fevers or chills. No focal deficits. No melena or epistaxis; however, he's had ongoing hematuria. No dysuria.    Red reports ongoing low flow alarms, particularly today.      PAST MEDICAL HISTORY:  Past Medical History:   Diagnosis Date     Acute kidney injury (H)      CKD (chronic kidney disease)      Coronary artery disease      Diabetes mellitus (H)      HTN (hypertension)      Hyperlipidemia      Ischemic cardiomyopathy      Paroxysmal atrial flutter (H)      Systolic heart failure (H)      Ventricular tachycardia (H)        FAMILY HISTORY:  No family history on file.    SOCIAL HISTORY:  Social History     Social History      Marital status: Single     Spouse name: N/A     Number of children: N/A     Years of education: N/A     Social History Main Topics     Smoking status: Never Smoker     Smokeless tobacco: Never Used     Alcohol use Not on file     Drug use: Not on file     Sexual activity: Not on file     Other Topics Concern     Not on file     Social History Narrative     CURRENT MEDICATIONS:  Outpatient Medications Prior to Visit   Medication Sig Dispense Refill     acetaminophen (TYLENOL) 325 MG tablet Take 2 tablets (650 mg) by mouth every 6 hours as needed for pain 1 Bottle 0     amiodarone (PACERONE/CODARONE) 200 MG tablet Take 1 tablet (200 mg) by mouth daily 30 tablet 0     aspirin (ASA) 81 MG chewable tablet Take 1 tablet (81 mg) by mouth daily 30 tablet 0     carvedilol (COREG) 3.125 MG tablet Take 2 tablets (6.25 mg) by mouth 2 times daily (with meals) 60 tablet 11     digoxin (LANOXIN) 125 MCG tablet Take 1 tablet (125 mcg) by mouth daily 30 tablet 0     famotidine (PEPCID) 20 MG tablet Take 1 tablet (20 mg) by mouth 2 times daily 60 tablet 0     hydrALAZINE (APRESOLINE) 50 MG tablet Take 2 tablets (100 mg) by mouth 3 times daily 100 tablet 11     insulin aspart (NOVOLOG PEN) 100 UNIT/ML pen Inject 1-10 Units Subcutaneous 3 times daily (before meals) Correction Scale - HIGH INSULIN RESISTANCE DOSING     -164 =1 units.   -189 =2.   -214 =3.   -239 =4.   -264 =5.   -289 =6.   -314 =7.   -339 =8.   -364 =9.  BG greater than or equal to 365 give 10 units  To be given with meal insulin, based on pre-meal BG 9 mL 0     insulin glargine (LANTUS SOLOSTAR PEN) 100 UNIT/ML pen Inject 20 Units Subcutaneous At Bedtime 6 mL 0     lisinopril (PRINIVIL/ZESTRIL) 2.5 MG tablet Take 2 tablets (5 mg) by mouth 2 times daily 120 tablet 11     nitroGLYcerin (NITROSTAT) 0.4 MG sublingual tablet Place 1 tablet (0.4 mg) under the tongue every 5 minutes as needed for chest pain For chest pain  place 1 tablet under the tongue every 5 minutes for 3 doses. If symptoms persist 5 minutes after 1st dose call 911. 10 tablet 0     rosuvastatin (CRESTOR) 20 MG tablet Take 1 tablet (20 mg) by mouth At Bedtime 30 tablet 0     senna-docusate (SENOKOT-S/PERICOLACE) 8.6-50 MG tablet Take 1 tablet by mouth daily as needed for constipation hold for loose stools 60 tablet 0     warfarin (COUMADIN) 2.5 MG tablet To take dose as directed by your INR clinic 200 tablet 1     insulin aspart (NOVOLOG PEN) 100 UNIT/ML pen Inject 1-7 Units Subcutaneous At Bedtime HIGH INSULIN RESISTANCE DOSING    Bedtime  For  - 224 give 1 units.   For  - 249 give 2 units.   For  - 274 give 3 units.   For  - 299 give 4 units.   For  - 324 give 5 units.   For  - 349 give 6 units.   For BG greater than or equal to 350 give 7 units. (Patient not taking: Reported on 2019) 2.1 mL 0     insulin aspart (NOVOLOG PEN) 100 UNIT/ML pen Prandial Dosin units per 10 grams of carbohydrate each Meal or Snack.  Only chart total amount of units given.  Do not give if pre-prandial glucose is less than 60 mg/dL. If given at mealtime, administer within 30 minutes of start of meal. (Patient not taking: Reported on 2019)       polyethylene glycol (MIRALAX/GLYCOLAX) packet Take 17 g by mouth daily as needed for constipation (Patient not taking: Reported on 2019) 14 packet 0     No facility-administered medications prior to visit.      ROS:   CONSTITUTIONAL: Denies fever, chills, fatigue, or weight fluctuations.   HEENT: Denies headache and changes in speech. He complains of vision changes.   CV: Refer to HPI.   PULMONARY:Denies shortness of breath, cough, or previous TB exposure.   GI:Denies nausea, vomiting, diarrhea, and abdominal pain. Bowel movements are regular.   :+ Hematuria noted in HPI; Denies urinary alterations, dysuria, urinary frequency,and abnormal drainage.   EXT:Denies lower extremity edema.  "  SKIN:Denies abnormal rashes or lesions.   MUSCULOSKELETAL:Denies upper or lower extremity weakness and pain.   NEUROLOGIC:Denies lightheadedness, dizziness, seizures, or upper or lower extremity paresthesia.     EXAM:  BP (!) 98/0 (BP Location: Right arm, Patient Position: Chair, Cuff Size: Adult Regular)   Pulse 60   Ht 1.626 m (5' 4\")   Wt 83.9 kg (184 lb 14.4 oz)   SpO2 96%   BMI 31.74 kg/m    GENERAL: Appears alert and oriented times three.   HEENT: Eye symmetrical and free of discharge bilaterally. Mucous membranes moist and without lesions.  NECK: Supple and without lymphadenopathy.   CV: pulsatile. Mechanical hum of LVAD. No JVD  RESPIRATORY: Respirations regular, even, and unlabored. Lungs CTA throughout.   GI: Soft and non distended with normoactive bowel sounds present in all quadrants. No tenderness, rebound, guarding. No organomegaly.    EXTREMITIES: No peripheral edema.   NEUROLOGIC: Alert and orientated x 3. CN II-XII grossly intact. No focal deficits.   MUSCULOSKELETAL: No joint swelling or tenderness.   SKIN: No jaundice. No rashes or lesions    VAD Interrogation on 2/11/19: VAD interrogation reviewed with VAD coordinator. Agree with findings. History is less than 24 hours long with frequent PI events and numerous Low flow alarms this morning. No  power spikes or other findings suspicious of pump malfunction noted.     Labs:  CBC RESULTS:  Lab Results   Component Value Date    WBC 7.1 02/11/2019    RBC 3.65 (L) 02/11/2019    HGB 10.4 (L) 02/11/2019    HCT 33.2 (L) 02/11/2019    MCV 91 02/11/2019    MCH 28.5 02/11/2019    MCHC 31.3 (L) 02/11/2019    RDW 16.4 (H) 02/11/2019     02/11/2019       CMP RESULTS:  Lab Results   Component Value Date     02/11/2019    POTASSIUM 4.9 02/11/2019    CHLORIDE 109 02/11/2019    CO2 21 02/11/2019    ANIONGAP 7 02/11/2019     (H) 02/11/2019    BUN 37 (H) 02/11/2019    CR 1.71 (H) 02/11/2019    GFRESTIMATED 44 (L) 02/11/2019    GFRESTBLACK 51 " (L) 02/11/2019    ARLENE 8.5 02/11/2019    BILITOTAL 0.5 02/11/2019    ALBUMIN 3.5 02/11/2019    ALKPHOS 68 02/11/2019    ALT 32 02/11/2019    AST 20 02/11/2019        INR RESULTS:  Lab Results   Component Value Date    INR 2.51 (H) 02/11/2019       Lab Results   Component Value Date    MAG 2.2 01/07/2019     Lab Results   Component Value Date    NTBNPI 1,177 (H) 01/10/2019     Lab Results   Component Value Date    NTBNP 686 (H) 01/23/2019      Cardiopulmonary Stress test 8/6/18  Walked 7 min 22 seconds. MVO2 12.5.  RER 1.1, VE/VCO2 slope: 48.24. /52 at rest, 105/59 exercise.    Heart catheterization 7/11/18 at OSH       Assessment and Plan:   Red is a pleasant 60 year old man s/p HM3 LVAD with ongoing low flow alarms. Imaging and device interrogation are not suspicious for outflow kink. Blood pressure (via doppler) is elevated today and may represent SBP rather than MAP. Either way, it's too high and likely contributing to the low flow alarms. Creatinine is mildly elevated (1.71) since his last visit, so we'll defer escalation of lisinopril today. We'll add isosorbide 30 mg once daily.    Ongoing hematuria. I'm forwarding UA results(+glucose, albumin, RBC, and mucous following a week of ABX) to Dr. Ortiz for her input. Consider urology referral.     Repeat labs this Thursday.     35 minutes spent in direct care, >50% in counseling        ALETHEA VORA

## 2019-02-11 NOTE — NURSING NOTE
Chief Complaint   Patient presents with     Follow Up     6 week post VAD implant F/U     Vitals were taken and medications were reconciled.    1:37 PM Garrett Lopez, Student CMA

## 2019-02-11 NOTE — PATIENT INSTRUCTIONS
It was a pleasure to see you in clinic today. Please do not hesitate to call with any questions or concerns. We look forward to seeing you in clinic at your next device check on 3/27/19.    Divina Bryant RN  Electrophysiology Nurse Clinician  [facility]  During business hours call:  [phone]  After business hours please call: [phone]- select option #4 and ask for job code [id number].

## 2019-02-11 NOTE — NURSING NOTE
D:  Pt 8 weeks post implant.  I:  Stitch cut around driveline.    A:  Quarter size area around driveline reddened.  Scant drainage noted.  Hub of driveline slightly visible.  P:  Pt to RTC on 2/20.  Possibly change to weekly dressing and give pt shower bag.

## 2019-02-11 NOTE — LETTER
2/11/2019      RE: Mariusz Sharp  [address] [facility] [address]       Dear Colleague,    Thank you for the opportunity to participate in the care of your patient, Mariusz Sharp, at the [facility]. Please see a copy of my visit note below.    HPI:   Mr. 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 Hospital course notable for mild RV dysfunction improved with dobutamine. He was discharged to TCU where he stayed for several days before he was discharged to home. Unfortunately, he presented to the ED after a mechanical fall and subsequent head injury with no LOC. Head CT was negative. He has been seen several times with low flow alarms that appear to be related to afterload and volume status. We've been escalating his afterload reduction and adjusted the hematocrit setting on his LVAD to decrease the frequency of low flow alarms. CT image of his outflow graft was negative for kinking. He also had a recent UTI treated with a 7 day course of Bactrim DS. He presents for follow up.    Red had several lightheadedness episodes over the weekend, once with orthostatic position changes in the late evening and once an hour after morning medicationss while walking around.   No sob, chest pain, rare palpitations, minimal LE edema, no anorexia.     Red's exit site is without redness, discharge, or tenderness. No fevers or chills. No focal deficits. No melena or epistaxis; however, he's had ongoing hematuria. No dysuria.    Red reports ongoing low flow alarms, particularly today.      PAST MEDICAL HISTORY:  Past Medical History:   Diagnosis Date     Acute kidney injury (H)      CKD (chronic kidney disease)      Coronary artery disease       Diabetes mellitus (H)      HTN (hypertension)      Hyperlipidemia      Ischemic cardiomyopathy      Paroxysmal atrial flutter (H)      Systolic heart failure (H)      Ventricular tachycardia (H)        FAMILY HISTORY:  No family history on file.    SOCIAL HISTORY:  Social History     Social History     Marital status: Single     Spouse name: N/A     Number of children: N/A     Years of education: N/A     Social History Main Topics     Smoking status: Never Smoker     Smokeless tobacco: Never Used     Alcohol use Not on file     Drug use: Not on file     Sexual activity: Not on file     Other Topics Concern     Not on file     Social History Narrative     CURRENT MEDICATIONS:  Outpatient Medications Prior to Visit   Medication Sig Dispense Refill     acetaminophen (TYLENOL) 325 MG tablet Take 2 tablets (650 mg) by mouth every 6 hours as needed for pain 1 Bottle 0     amiodarone (PACERONE/CODARONE) 200 MG tablet Take 1 tablet (200 mg) by mouth daily 30 tablet 0     aspirin (ASA) 81 MG chewable tablet Take 1 tablet (81 mg) by mouth daily 30 tablet 0     carvedilol (COREG) 3.125 MG tablet Take 2 tablets (6.25 mg) by mouth 2 times daily (with meals) 60 tablet 11     digoxin (LANOXIN) 125 MCG tablet Take 1 tablet (125 mcg) by mouth daily 30 tablet 0     famotidine (PEPCID) 20 MG tablet Take 1 tablet (20 mg) by mouth 2 times daily 60 tablet 0     hydrALAZINE (APRESOLINE) 50 MG tablet Take 2 tablets (100 mg) by mouth 3 times daily 100 tablet 11     insulin aspart (NOVOLOG PEN) 100 UNIT/ML pen Inject 1-10 Units Subcutaneous 3 times daily (before meals) Correction Scale - HIGH INSULIN RESISTANCE DOSING     -164 =1 units.   -189 =2.   -214 =3.   -239 =4.   -264 =5.   -289 =6.   -314 =7.   -339 =8.   -364 =9.  BG greater than or equal to 365 give 10 units  To be given with meal insulin, based on pre-meal BG 9 mL 0     insulin glargine (LANTUS SOLOSTAR PEN) 100 UNIT/ML pen  Inject 20 Units Subcutaneous At Bedtime 6 mL 0     lisinopril (PRINIVIL/ZESTRIL) 2.5 MG tablet Take 2 tablets (5 mg) by mouth 2 times daily 120 tablet 11     nitroGLYcerin (NITROSTAT) 0.4 MG sublingual tablet Place 1 tablet (0.4 mg) under the tongue every 5 minutes as needed for chest pain For chest pain place 1 tablet under the tongue every 5 minutes for 3 doses. If symptoms persist 5 minutes after 1st dose call 911. 10 tablet 0     rosuvastatin (CRESTOR) 20 MG tablet Take 1 tablet (20 mg) by mouth At Bedtime 30 tablet 0     senna-docusate (SENOKOT-S/PERICOLACE) 8.6-50 MG tablet Take 1 tablet by mouth daily as needed for constipation hold for loose stools 60 tablet 0     warfarin (COUMADIN) 2.5 MG tablet To take dose as directed by your INR clinic 200 tablet 1     insulin aspart (NOVOLOG PEN) 100 UNIT/ML pen Inject 1-7 Units Subcutaneous At Bedtime HIGH INSULIN RESISTANCE DOSING    Bedtime  For  - 224 give 1 units.   For  - 249 give 2 units.   For  - 274 give 3 units.   For  - 299 give 4 units.   For  - 324 give 5 units.   For  - 349 give 6 units.   For BG greater than or equal to 350 give 7 units. (Patient not taking: Reported on 2019) 2.1 mL 0     insulin aspart (NOVOLOG PEN) 100 UNIT/ML pen Prandial Dosin units per 10 grams of carbohydrate each Meal or Snack.  Only chart total amount of units given.  Do not give if pre-prandial glucose is less than 60 mg/dL. If given at mealtime, administer within 30 minutes of start of meal. (Patient not taking: Reported on 2019)       polyethylene glycol (MIRALAX/GLYCOLAX) packet Take 17 g by mouth daily as needed for constipation (Patient not taking: Reported on 2019) 14 packet 0     No facility-administered medications prior to visit.      ROS:   CONSTITUTIONAL: Denies fever, chills, fatigue, or weight fluctuations.   HEENT: Denies headache and changes in speech. He complains of vision changes.   CV: Refer to HPI.  "  PULMONARY:Denies shortness of breath, cough, or previous TB exposure.   GI:Denies nausea, vomiting, diarrhea, and abdominal pain. Bowel movements are regular.   :+ Hematuria noted in HPI; Denies urinary alterations, dysuria, urinary frequency,and abnormal drainage.   EXT:Denies lower extremity edema.   SKIN:Denies abnormal rashes or lesions.   MUSCULOSKELETAL:Denies upper or lower extremity weakness and pain.   NEUROLOGIC:Denies lightheadedness, dizziness, seizures, or upper or lower extremity paresthesia.     EXAM:  BP (!) 98/0 (BP Location: Right arm, Patient Position: Chair, Cuff Size: Adult Regular)   Pulse 60   Ht 1.626 m (5' 4\")   Wt 83.9 kg (184 lb 14.4 oz)   SpO2 96%   BMI 31.74 kg/m     GENERAL: Appears alert and oriented times three.   HEENT: Eye symmetrical and free of discharge bilaterally. Mucous membranes moist and without lesions.  NECK: Supple and without lymphadenopathy.   CV: pulsatile. Mechanical hum of LVAD. No JVD  RESPIRATORY: Respirations regular, even, and unlabored. Lungs CTA throughout.   GI: Soft and non distended with normoactive bowel sounds present in all quadrants. No tenderness, rebound, guarding. No organomegaly.    EXTREMITIES: No peripheral edema.   NEUROLOGIC: Alert and orientated x 3. CN II-XII grossly intact. No focal deficits.   MUSCULOSKELETAL: No joint swelling or tenderness.   SKIN: No jaundice. No rashes or lesions    VAD Interrogation on 2/11/19: VAD interrogation reviewed with VAD coordinator. Agree with findings. History is less than 24 hours long with frequent PI events and numerous Low flow alarms this morning. No  power spikes or other findings suspicious of pump malfunction noted.     Labs:  CBC RESULTS:  Lab Results   Component Value Date    WBC 7.1 02/11/2019    RBC 3.65 (L) 02/11/2019    HGB 10.4 (L) 02/11/2019    HCT 33.2 (L) 02/11/2019    MCV 91 02/11/2019    MCH 28.5 02/11/2019    MCHC 31.3 (L) 02/11/2019    RDW 16.4 (H) 02/11/2019     " 02/11/2019       CMP RESULTS:  Lab Results   Component Value Date     02/11/2019    POTASSIUM 4.9 02/11/2019    CHLORIDE 109 02/11/2019    CO2 21 02/11/2019    ANIONGAP 7 02/11/2019     (H) 02/11/2019    BUN 37 (H) 02/11/2019    CR 1.71 (H) 02/11/2019    GFRESTIMATED 44 (L) 02/11/2019    GFRESTBLACK 51 (L) 02/11/2019    ARLENE 8.5 02/11/2019    BILITOTAL 0.5 02/11/2019    ALBUMIN 3.5 02/11/2019    ALKPHOS 68 02/11/2019    ALT 32 02/11/2019    AST 20 02/11/2019        INR RESULTS:  Lab Results   Component Value Date    INR 2.51 (H) 02/11/2019       Lab Results   Component Value Date    MAG 2.2 01/07/2019     Lab Results   Component Value Date    NTBNPI 1,177 (H) 01/10/2019     Lab Results   Component Value Date    NTBNP 686 (H) 01/23/2019      Cardiopulmonary Stress test 8/6/18  Walked 7 min 22 seconds. MVO2 12.5.  RER 1.1, VE/VCO2 slope: 48.24. /52 at rest, 105/59 exercise.    Heart catheterization 7/11/18 at OSH       Assessment and Plan:   Red is a pleasant 60 year old man s/p HM3 LVAD with ongoing low flow alarms. Imaging and device interrogation are not suspicious for outflow kink. Blood pressure (via doppler) is elevated today and may represent SBP rather than MAP. Either way, it's too high and likely contributing to the low flow alarms. Creatinine is mildly elevated (1.71) since his last visit, so we'll defer escalation of lisinopril today. We'll add isosorbide 30 mg once daily.    Ongoing hematuria. I'm forwarding UA results(+glucose, albumin, RBC, and mucous following a week of ABX) to Dr. Ortiz for her input. Consider urology referral.     Repeat labs this Thursday.     35 minutes spent in direct care, >50% in counseling        CC  ALETHEA VOGT      Please do not hesitate to contact me if you have any questions/concerns.     Sincerely,     Meredith Garcia NP

## 2019-02-11 NOTE — PATIENT INSTRUCTIONS
Medications:  1.  Start Isosorbide 30mg daily.  Call for increased lightheadedness, dizziness, decreased PIs.    Follow-up:  1.  Return to clinic 2/20 to see Dr. Ortiz.    Instructions:  1. Have labs drawn on Thursday.  2. Move slowly when changing positions.  3. Push fluids.  Carry a water bottle with you wherever you go.        Page the VAD Coordinator on call if you gain more than 3 lb in a day or 5 in a week. Please also page if you feel unwell or have alarms.     Great to see you in clinic today. To Page the VAD Coordinator on call, dial [phone] option #[phone] and ask to speak to the VAD coordinator on call.

## 2019-02-11 NOTE — LETTER
2/11/2019      RE: Mariusz Sharp  [address] [facility] [place] [address]       Dear Colleague,    Thank you for the opportunity to participate in the care of your patient, Mariusz Sharp, at the [facility]. Please see a copy of my visit note below.    Service Date: 02/11/2019      REASON FOR VISIT:  I was requested to see Mr. Sharp for followup after evaluation of HeartMate III LVAD placement.        HISTORY OF PRESENT ILLNESS:  The patient is a 58-year-old gentleman with a past medical history of coronary artery disease, status post HeartMate III placement on 12/31/2018.  His postoperative course was complicated by mild erectile dysfunction that improved with inotropes.  He was discharged to TCU.  Over the last several days he has had multiple low simona lumps that have recently gotten better.  His effort tolerance is improved.  He denies any fever, chills, syncope, palpitation, or loss of consciousness.  He does complain of some lightheadedness with exertion.      PAST MEDICAL HISTORY:  Reviewed.       FAMILY HISTORY:  Reviewed.      SOCIAL HISTORY:  Denies alcohol, drug or tobacco use.      MEDICATION LIST:  Reviewed.      REVIEW OF SYSTEMS:     CONSTITUTIONAL:  He denies fatigue, fever or chills.   RESPIRATORY:  None.   CARDIOVASCULAR:  HeartMate III LVAD.   GENITOURINARY:  None.   GASTROINTESTINAL:  None.   ENDOCRINE:  None.   MUSCULOSKELETAL:  None.   NEUROLOGIC:  None.   PSYCHIATRIC:  None.   IMMUNOLOGIC:  None.   EYES:  None.      PHYSICAL EXAMINATION:     VITAL SIGNS:  He is afebrile.  Vitals are stable.     CARDIOVASCULAR:  Cardiac parameters currently appear stable.  Sternal wound has healed well with no evidence of infection or instability.   RESPIRATORY:  Bilateral breath sounds.  No rales, rhonchi or crepitations.   ABDOMEN:  Bowel sounds present, nontender.   LOWER EXTREMITIES:  Warm.  No pedal edema.   NEUROLOGICAL:  No focal deficits.         LABORATORY:  White cell count within normal limits.  INR 2.5.  Electrolytes within normal limits.      ASSESSMENT AND PLAN:  A 58-year-old gentleman status post HeartMate III LVAD placement for ischemic cardiomyopathy.  He is currently doing well.  Of concern are multiple low-flow alarms that he has had that might have been related to aggressive diuretic and hydralazine use.  They are currently better.  If they persist, we may need to repeat a right heart cath to evaluate his hemodynamic status.        If there are any questions regarding his care, please contact me.      CONSTANTIN CARTER MD       D: 2019   T: 2019   MT: ally      Name:     ADILSON RINCON   MRN:      [id number]        Account:      [id number]   :      1960           Service Date: 2019      Document: [id number]       cc: [facility] Billing

## 2019-02-12 LAB
BACTERIA SPEC CULT: NO GROWTH
Lab: NORMAL
SPECIMEN SOURCE: NORMAL

## 2019-02-13 DIAGNOSIS — I50.22 CHRONIC SYSTOLIC CONGESTIVE HEART FAILURE (H): ICD-10-CM

## 2019-02-13 DIAGNOSIS — Z95.811 LVAD (LEFT VENTRICULAR ASSIST DEVICE) PRESENT (H): Primary | ICD-10-CM

## 2019-02-13 LAB
MDC_IDC_LEAD_IMPLANT_DT: NORMAL
MDC_IDC_LEAD_IMPLANT_DT: NORMAL
MDC_IDC_LEAD_LOCATION: NORMAL
MDC_IDC_LEAD_LOCATION: NORMAL
MDC_IDC_LEAD_LOCATION_DETAIL_1: NORMAL
MDC_IDC_LEAD_LOCATION_DETAIL_1: NORMAL
MDC_IDC_LEAD_MFG: NORMAL
MDC_IDC_LEAD_MFG: NORMAL
MDC_IDC_LEAD_MODEL: NORMAL
MDC_IDC_LEAD_MODEL: NORMAL
MDC_IDC_LEAD_POLARITY_TYPE: NORMAL
MDC_IDC_LEAD_POLARITY_TYPE: NORMAL
MDC_IDC_LEAD_SERIAL: NORMAL
MDC_IDC_LEAD_SERIAL: NORMAL
MDC_IDC_LEAD_SPECIAL_FUNCTION: NORMAL
MDC_IDC_LEAD_SPECIAL_FUNCTION: NORMAL
MDC_IDC_MSMT_BATTERY_DTM: NORMAL
MDC_IDC_MSMT_BATTERY_REMAINING_LONGEVITY: 144 MO
MDC_IDC_MSMT_BATTERY_STATUS: NORMAL
MDC_IDC_MSMT_CAP_CHARGE_DTM: NORMAL
MDC_IDC_MSMT_CAP_CHARGE_ENERGY: 41 J
MDC_IDC_MSMT_CAP_CHARGE_TIME: 8.39
MDC_IDC_MSMT_CAP_CHARGE_TIME: 9.28
MDC_IDC_MSMT_CAP_CHARGE_TYPE: NORMAL
MDC_IDC_MSMT_CAP_CHARGE_TYPE: NORMAL
MDC_IDC_MSMT_LEADCHNL_RV_IMPEDANCE_VALUE: 925 OHM
MDC_IDC_MSMT_LEADCHNL_RV_PACING_THRESHOLD_AMPLITUDE: 1 V
MDC_IDC_MSMT_LEADCHNL_RV_PACING_THRESHOLD_AMPLITUDE: 1.7 V
MDC_IDC_MSMT_LEADCHNL_RV_PACING_THRESHOLD_PULSEWIDTH: 0.4 MS
MDC_IDC_MSMT_LEADCHNL_RV_PACING_THRESHOLD_PULSEWIDTH: 1 MS
MDC_IDC_MSMT_LEADCHNL_RV_SENSING_INTR_AMPL: 5.6 MV
MDC_IDC_PG_IMPLANT_DTM: NORMAL
MDC_IDC_PG_MFG: NORMAL
MDC_IDC_PG_MODEL: NORMAL
MDC_IDC_PG_SERIAL: NORMAL
MDC_IDC_PG_TYPE: NORMAL
MDC_IDC_SESS_CLINIC_NAME: NORMAL
MDC_IDC_SESS_DTM: NORMAL
MDC_IDC_SESS_TYPE: NORMAL
MDC_IDC_SET_BRADY_HYSTRATE: NORMAL
MDC_IDC_SET_BRADY_LOWRATE: 40 {BEATS}/MIN
MDC_IDC_SET_BRADY_MODE: NORMAL
MDC_IDC_SET_LEADCHNL_RV_PACING_AMPLITUDE: 2 V
MDC_IDC_SET_LEADCHNL_RV_PACING_ANODE_ELECTRODE_1: NORMAL
MDC_IDC_SET_LEADCHNL_RV_PACING_ANODE_LOCATION_1: NORMAL
MDC_IDC_SET_LEADCHNL_RV_PACING_CAPTURE_MODE: NORMAL
MDC_IDC_SET_LEADCHNL_RV_PACING_CATHODE_ELECTRODE_1: NORMAL
MDC_IDC_SET_LEADCHNL_RV_PACING_CATHODE_LOCATION_1: NORMAL
MDC_IDC_SET_LEADCHNL_RV_PACING_POLARITY: NORMAL
MDC_IDC_SET_LEADCHNL_RV_PACING_PULSEWIDTH: 1 MS
MDC_IDC_SET_LEADCHNL_RV_SENSING_ADAPTATION_MODE: NORMAL
MDC_IDC_SET_LEADCHNL_RV_SENSING_ANODE_ELECTRODE_1: NORMAL
MDC_IDC_SET_LEADCHNL_RV_SENSING_ANODE_LOCATION_1: NORMAL
MDC_IDC_SET_LEADCHNL_RV_SENSING_CATHODE_ELECTRODE_1: NORMAL
MDC_IDC_SET_LEADCHNL_RV_SENSING_CATHODE_LOCATION_1: NORMAL
MDC_IDC_SET_LEADCHNL_RV_SENSING_POLARITY: NORMAL
MDC_IDC_SET_LEADCHNL_RV_SENSING_SENSITIVITY: 0.6 MV
MDC_IDC_SET_ZONE_DETECTION_INTERVAL: 300 MS
MDC_IDC_SET_ZONE_DETECTION_INTERVAL: 375 MS
MDC_IDC_SET_ZONE_TYPE: NORMAL
MDC_IDC_SET_ZONE_VENDOR_TYPE: NORMAL
MDC_IDC_STAT_BRADY_RV_PERCENT_PACED: 1 %
MDC_IDC_STAT_EPISODE_RECENT_COUNT: 0
MDC_IDC_STAT_EPISODE_RECENT_COUNT: 0
MDC_IDC_STAT_EPISODE_RECENT_COUNT: 1
MDC_IDC_STAT_EPISODE_RECENT_COUNT_DTM_END: NORMAL
MDC_IDC_STAT_EPISODE_RECENT_COUNT_DTM_START: NORMAL
MDC_IDC_STAT_EPISODE_TOTAL_COUNT: 0
MDC_IDC_STAT_EPISODE_TOTAL_COUNT: 49
MDC_IDC_STAT_EPISODE_TOTAL_COUNT: 6
MDC_IDC_STAT_EPISODE_TOTAL_COUNT_DTM_END: NORMAL
MDC_IDC_STAT_EPISODE_TYPE: NORMAL
MDC_IDC_STAT_EPISODE_VENDOR_TYPE: NORMAL
MDC_IDC_STAT_TACHYTHERAPY_ATP_DELIVERED_RECENT: 0
MDC_IDC_STAT_TACHYTHERAPY_ATP_DELIVERED_TOTAL: 9
MDC_IDC_STAT_TACHYTHERAPY_RECENT_DTM_END: NORMAL
MDC_IDC_STAT_TACHYTHERAPY_RECENT_DTM_START: NORMAL
MDC_IDC_STAT_TACHYTHERAPY_SHOCKS_ABORTED_RECENT: 0
MDC_IDC_STAT_TACHYTHERAPY_SHOCKS_ABORTED_TOTAL: 0
MDC_IDC_STAT_TACHYTHERAPY_SHOCKS_DELIVERED_RECENT: 0
MDC_IDC_STAT_TACHYTHERAPY_SHOCKS_DELIVERED_TOTAL: 5
MDC_IDC_STAT_TACHYTHERAPY_TOTAL_DTM_END: NORMAL

## 2019-02-13 RX ORDER — LIDOCAINE 40 MG/G
CREAM TOPICAL
Status: CANCELLED | OUTPATIENT
Start: 2019-02-13

## 2019-02-14 ENCOUNTER — CARE COORDINATION (OUTPATIENT)
Dept: CARDIOLOGY | Facility: CLINIC | Age: 59
End: 2019-02-14

## 2019-02-14 ENCOUNTER — ANTICOAGULATION THERAPY VISIT (OUTPATIENT)
Dept: ANTICOAGULATION | Facility: CLINIC | Age: 59
End: 2019-02-14

## 2019-02-14 DIAGNOSIS — Z79.01 LONG TERM (CURRENT) USE OF ANTICOAGULANTS: ICD-10-CM

## 2019-02-14 DIAGNOSIS — I50.23 ACUTE ON CHRONIC SYSTOLIC HEART FAILURE (H): ICD-10-CM

## 2019-02-14 DIAGNOSIS — Z95.811 LVAD (LEFT VENTRICULAR ASSIST DEVICE) PRESENT (H): ICD-10-CM

## 2019-02-14 DIAGNOSIS — I50.22 CHRONIC SYSTOLIC CONGESTIVE HEART FAILURE (H): ICD-10-CM

## 2019-02-14 LAB
ANION GAP SERPL CALCULATED.3IONS-SCNC: 9 MMOL/L (ref 3–14)
BUN SERPL-MCNC: 31 MG/DL (ref 7–30)
CALCIUM SERPL-MCNC: 8.4 MG/DL (ref 8.5–10.1)
CHLORIDE SERPL-SCNC: 105 MMOL/L (ref 94–109)
CO2 SERPL-SCNC: 24 MMOL/L (ref 20–32)
CREAT SERPL-MCNC: 1.35 MG/DL (ref 0.66–1.25)
GFR SERPL CREATININE-BSD FRML MDRD: 58 ML/MIN/{1.73_M2}
GLUCOSE SERPL-MCNC: 271 MG/DL (ref 70–99)
INR PPP: 2.79 (ref 0.86–1.14)
POTASSIUM SERPL-SCNC: 4.5 MMOL/L (ref 3.4–5.3)
SODIUM SERPL-SCNC: 138 MMOL/L (ref 133–144)

## 2019-02-14 NOTE — PROGRESS NOTES
Called patient/caregiver to check in 4 weeks post discharge. Pt reports VAD parameters ok - low flows have decreased to 1-3 per day and weight stable. Reviewed medications and answered any questions. Patient reports sleeping well and no anxiety since being home with LVAD. Patient is able to move around the house and care for himself independently.     Discussed specific new problems/stressors since being discharged from the hospital: pt will have [facility] next week to evaluate ongoing issues with alarms. Empathized with patient and reviewed coping strategies: enlisting support from friends and love ones, attending patient and caregiver support groups, reviewing LVAD educational materials to reinforce knowledge, and talking about concerns with family/care providers/trusted others. Encouraged pt to page VAD Coordinator with any issues or questions. Pt verbalizes understanding.

## 2019-02-14 NOTE — PROGRESS NOTES
ANTICOAGULATION FOLLOW-UP CLINIC VISIT    Patient Name:  Mariusz Sharp  Date:  2019  Contact Type:  Telephone    SUBJECTIVE:     Patient Findings     Positives:   No Problem Findings           OBJECTIVE    INR   Date Value Ref Range Status   2019 2.79 (H) 0.86 - 1.14 Final     Chromogenic Factor 10   Date Value Ref Range Status   2019 72 70 - 130 % Final     Comment:     Therapeutic Range:  A Chromogenic Factor 10 level of approximately 20-40%   inversely correlates with an INR of 2-3 for patients receiving Warfarin.   Chromogenic Factor 10 levels below 20% indicate an INR greater than 3 and   levels above 40% indicate an INR less than 2.         ASSESSMENT / PLAN  INR assessment THER    Recheck INR In: 4 DAYS    INR Location Clinic      Anticoagulation Summary  As of 2019    INR goal:   2.0-3.0   TTR:   57.2 % (3.4 wk)   INR used for dosin.79 (2019)   Warfarin maintenance plan:   No maintenance plan   Full warfarin instructions:   : 5 mg; 2/15: 7.5 mg; : 5 mg; : 7.5 mg   Plan last modified:   Chasity Gonzalez RN (2019)   Next INR check:   2019   Priority:   INR   Target end date:   Indefinite    Indications    Heart failure (H) [I50.9]  LVAD (left ventricular assist device) present (H) [Z95.811]             Anticoagulation Episode Summary     INR check location:       Preferred lab:       Send INR reminders to:   [facility]    Comments:   LVAD implanted  ASA 81mg Daily Has 2.5mg tablets   19 Staying at [facility]        Anticoagulation Care Providers     Provider Role Specialty Phone number    Jenni Ortiz MD [facility] [phone]            See the Encounter Report to view Anticoagulation Flowsheet and Dosing Calendar (Go to Encounters tab in chart review, and find the Anticoagulation Therapy Visit)    Spoke with patient. Gave them their lab results and new warfarin recommendation.  No changes in health, medication,  or diet. No missed doses, no falls. No signs or symptoms of bleed or clotting.    Patient had LVAD placed on:   1/17/19  Patient's current Aspirin dose: 81mg  LVAD Protocol followed:  Yes.   If Not Followed Explanation:  Sandor Zaman, RN

## 2019-02-17 NOTE — PROGRESS NOTES
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 His LVAD postoperative course was complicated by mild RV dysfunction  Requiring dobutamine but he was successfully  discharged to TCU. He had a mechanical fall with CTH negative for bleed and has been seen at core clinic with low flow alarms that are asymptomatic thought to be related to afterload and volume status. CT was done there is no problem with the LVAD positioning. RHC yesterday showed normal Right and L sided filling pressures CO/CI 3.7/2.0. He states the alarms are not as often as they used to happen they usually happen when he is going to the bathroom. He endorses some dizziness that has happened about 4 times after he takes his medicines.     Current cardiac meds  Amiodarone 200mg d  Asa  Carvedilol 6.25mg BID  Digoxin 125 mcg  Hydralazine 100mg TID  imdur 30mg/d  Lisinopril 5mg BID  Warfarin   Rosuvastatin 20mg qhs    PAST MEDICAL HISTORY:  Past Medical History:   Diagnosis Date     Acute kidney injury (H)      CKD (chronic kidney disease)      Coronary artery disease      Diabetes mellitus (H)      HTN (hypertension)      Hyperlipidemia      Ischemic cardiomyopathy      Paroxysmal atrial flutter (H)      Systolic heart failure (H)      Ventricular tachycardia (H)        FAMILY HISTORY:  Reviewed    SOCIAL HISTORY:  Social History     Social History     Marital status: Single     Spouse name: N/A     Number of children: N/A     Years of education: N/A     Social History Main Topics     Smoking status: Never Smoker     Smokeless tobacco: Never Used     Alcohol use Not on file     Drug use: Not on file     Sexual activity: Not on file     Other Topics Concern     Not on file     Social History Narrative        CURRENT MEDICATIONS:  Outpatient Medications Prior to Visit   Medication Sig Dispense Refill     amiodarone (PACERONE/CODARONE) 200 MG tablet Take 1 tablet (200 mg) by mouth daily 90 tablet 3     aspirin (ASA) 81 MG chewable tablet Take 1 tablet (81 mg) by mouth daily 90 tablet 3     carvedilol (COREG) 3.125 MG tablet Take 2 tablets (6.25 mg) by mouth 2 times daily (with meals) 60 tablet 11     digoxin (LANOXIN) 125 MCG tablet Take 1 tablet (125 mcg) by mouth daily 90 tablet 3     famotidine (PEPCID) 20 MG tablet Take 1 tablet (20 mg) by mouth 2 times daily 180 tablet 3     hydrALAZINE (APRESOLINE) 50 MG tablet Take 2 tablets (100 mg) by mouth 3 times daily 100 tablet 11     insulin aspart (NOVOLOG PEN) 100 UNIT/ML pen Prandial Dosin units per 10 grams of carbohydrate each Meal or Snack.  Only chart total amount of units given.  Do not give if pre-prandial glucose is less than 60 mg/dL. If given at mealtime, administer within 30 minutes of start of meal.       insulin glargine (LANTUS SOLOSTAR PEN) 100 UNIT/ML pen Inject 20 Units Subcutaneous At Bedtime       isosorbide mononitrate (IMDUR) 30 MG 24 hr tablet Take 1 tablet (30 mg) by mouth daily 90 tablet 3     lisinopril (PRINIVIL/ZESTRIL) 2.5 MG tablet Take 2 tablets (5 mg) by mouth 2 times daily 120 tablet 11     rosuvastatin (CRESTOR) 20 MG tablet Take 1 tablet (20 mg) by mouth At Bedtime 90 tablet 3     warfarin (COUMADIN) 2.5 MG tablet Take 5mg TuThSat and 7.5mg MWFSun, or as directed by the [facility] at the [facility]. 250 tablet 1     insulin aspart (NOVOLOG PEN) 100 UNIT/ML pen Inject 1-10 Units Subcutaneous 3 times daily (before meals) Correction Scale - HIGH INSULIN RESISTANCE DOSING     -164 =1 units.   -189 =2.   -214 =3.   -239 =4.   -264 =5.   -289 =6.   -314 =7.   -339 =8.   -364 =9.  BG greater than or equal to 365 give 10 units  To be given with meal insulin, based on  "pre-meal BG 9 mL 0     insulin aspart (NOVOLOG PEN) 100 UNIT/ML pen Inject 1-7 Units Subcutaneous At Bedtime HIGH INSULIN RESISTANCE DOSING    Bedtime  For  - 224 give 1 units.   For  - 249 give 2 units.   For  - 274 give 3 units.   For  - 299 give 4 units.   For  - 324 give 5 units.   For  - 349 give 6 units.   For BG greater than or equal to 350 give 7 units. (Patient not taking: Reported on 2/11/2019) 2.1 mL 0     insulin glargine (LANTUS SOLOSTAR PEN) 100 UNIT/ML pen Inject 20 Units Subcutaneous At Bedtime 6 mL 0     nitroGLYcerin (NITROSTAT) 0.4 MG sublingual tablet Place 1 tablet (0.4 mg) under the tongue every 5 minutes as needed for chest pain For chest pain place 1 tablet under the tongue every 5 minutes for 3 doses. If symptoms persist 5 minutes after 1st dose call 911. 10 tablet 0     warfarin (COUMADIN) 2.5 MG tablet To take dose as directed by your INR clinic (Patient not taking: Reported on 2/20/2019) 270 tablet 3     No facility-administered medications prior to visit.        ROS:   CONSTITUTIONAL: Denies fever, chills, fatigue, or weight fluctuations.   HEENT: Denies headache and changes in speech. He complains of vision changes.   CV: Refer to HPI.   PULMONARY:Denies shortness of breath, cough, or previous TB exposure.   GI:Denies nausea, vomiting, diarrhea, and abdominal pain. Bowel movements are regular.   :See HPI  EXT:Denies lower extremity edema.   SKIN:Denies abnormal rashes or lesions.   MUSCULOSKELETAL:Denies upper or lower extremity weakness and pain.   NEUROLOGIC:Denies lightheadedness, dizziness, seizures, or upper or lower extremity paresthesia.     EXAM:  BP (!) 68/0 (BP Location: Right arm, Patient Position: Chair, Cuff Size: Adult Regular)   Pulse (!) 32   Temp 97.7  F (36.5  C) (Oral)   Ht 1.626 m (5' 4\")   Wt 86.6 kg (191 lb)   SpO2 97%   BMI 32.79 kg/m    GENERAL: Appears alert and oriented times three. But is somewhat dizzy  NECK: Supple " and without lymphadenopathy. JVD ~6cm.  CV: LVAD Humm  RESPIRATORY: Respirations regular, even, and unlabored. Lungs CTA throughout.   GI: Soft and non distended with normoactive bowel sounds present in all quadrants. No tenderness, rebound, guarding. No organomegaly.    EXTREMITIES: No peripheral edema. .   NEUROLOGIC: Alert and orientated x 3. CN II-XII grossly intact. No focal deficits.   MUSCULOSKELETAL: No joint swelling or tenderness.   SKIN: No jaundice. No rashes or lesions.     Labs:  Component      Latest Ref Rng & Units 2/11/2019 2/14/2019 2/18/2019   Sodium      133 - 144 mmol/L 137 138 138   Potassium      3.4 - 5.3 mmol/L 4.9 4.5 4.2   Chloride      94 - 109 mmol/L 109 105 108   Carbon Dioxide      20 - 32 mmol/L 21 24 22   Anion Gap      3 - 14 mmol/L 7 9 8   Glucose      70 - 99 mg/dL 224 (H) 271 (H) 272 (H)   Urea Nitrogen      7 - 30 mg/dL 37 (H) 31 (H) 30   Creatinine      0.66 - 1.25 mg/dL 1.71 (H) 1.35 (H) 1.34 (H)   GFR Estimate      >60 mL/min/1.73:m2 44 (L) 58 (L) 59 (L)   GFR Estimate If Black      >60 mL/min/1.73:m2 51 (L) 67 68   Calcium      8.5 - 10.1 mg/dL 8.5 8.4 (L) 8.2 (L)   Bilirubin Total      0.2 - 1.3 mg/dL 0.5  0.6   Albumin      3.4 - 5.0 g/dL 3.5  3.5   Protein Total      6.8 - 8.8 g/dL 7.0  7.1   Alkaline Phosphatase      40 - 150 U/L 68  68   ALT      0 - 70 U/L 32  38   AST      0 - 45 U/L 20  27   WBC      4.0 - 11.0 10e9/L 7.1  5.3   RBC Count      4.4 - 5.9 10e12/L 3.65 (L)  3.76 (L)   Hemoglobin      13.3 - 17.7 g/dL 10.4 (L)  10.3 (L)   Hematocrit      40.0 - 53.0 % 33.2 (L)  34.2 (L)   MCV      78 - 100 fl 91  91   MCH      26.5 - 33.0 pg 28.5  27.4   MCHC      31.5 - 36.5 g/dL 31.3 (L)  30.1 (L)   RDW      10.0 - 15.0 % 16.4 (H)  16.5 (H)   Platelet Count      150 - 450 10e9/L 250  269   INR      0.86 - 1.14 2.51 (H) 2.79 (H) 2.70 (H)   Lactate Dehydrogenase      85 - 227 U/L 123  132       Diagnostics:  Recent Results (from the past 4320 hour(s))   ECHO COMPLETE  WITH OPTISON    Narrative    [id number]  [facility]73  [id number]  [id number]^STEFANIA^MANNY^V           [facility],[place]  Echocardiography Laboratory  [address] [place] [id number]     Name: ADILSON RINCON  MRN: [id number]  : 1960  Study Date: 2018 11:27 AM  Age: 57 yrs  Gender: Male  [place] [facility] [place]  Reason For Study: Cardiac Arrest  Ordering Physician: MANNY AGUIAR  Referring Physician: SELF, REFERRED  Performed By: Laya Read RDCS     BSA: 1.8 m2  Height: 64 in  Weight: 170 lb  HR: 78  BP: 99/76 mmHg  _____________________________________________________________________________  __        Procedure  Echocardiogram with two-dimensional, color and spectral Doppler performed.  Contrast Optison. Optison (NDC #9683-4752-81) given intravenously. Patient was  given 5 ml mixture of 3 ml Optison and 6 ml saline. 4 ml wasted.  _____________________________________________________________________________  __        Interpretation Summary  Left ventricular wall thickness is normal. Borderline left ventricular  dilation is present. LVIDd is 5.19cm The Ejection Fraction is estimated at 20-  25%. The mid to distal anteroseptum, mid to distal anterior wall, mid to  distal anterolateral wall and mid to apical septum and apex are akinetic. This  is consistent with large LAD territory infarct. Laminar thrombus is seen in  the LV apex  The right ventricle is normal size. Difficult to evaluate RV function but it  appears to be mild to moderately reduced.  Moderate tricuspid insufficiency is present. Right ventricular systolic  pressure is 26mmHg above the right atrial pressure.  Dilation of the inferior vena cava is present with normal respiratory  variation in diameter. Estimated mean right atrial pressure is 8 mmHg (mildly  elevated).  No pericardial effusion is present.     Previous study not available for  comparison.  _____________________________________________________________________________  __        Left Ventricle  Left ventricular wall thickness is normal. Borderline left ventricular  dilation is present. LVIDd is 5.19cm. Grade III or advanced diastolic  dysfunction. The Ejection Fraction is estimated at 20-25%. The mid to distal  anteroseptum, mid to distal anterior  wall, mid to distal anterolateral wall and mid to apical septum and apex are  akinetic. This is consistent with large LAD territory infarct. Laminar  thrombus is seen in the LV apex.     Right Ventricle  Right ventricular wall thickness is normal. The right ventricle is normal  size. Difficult to evaluate RV function but it appears to be mild to  moderately reduced. A pacemaker lead is noted in the right ventricle.     Atria  Moderate left atrial enlargement is present. Moderate right atrial enlargement  is present. A pacemaker lead is noted in the right atrium.     Mitral Valve  The mitral valve is normal. Trace mitral insufficiency is present.        Aortic Valve  Aortic valve is normal in structure and function. The aortic valve is  tricuspid.     Tricuspid Valve  The tricuspid valve is normal. Moderate tricuspid insufficiency is present.  The right ventricular systolic pressure is approximated at 26.0 mmHg plus the  right atrial pressure. Right ventricular systolic pressure is 26mmHg above the  right atrial pressure.     Pulmonic Valve  The pulmonic valve is normal. Trace pulmonic insufficiency is present.     Vessels  The aorta root is normal. Dilation of the inferior vena cava is present with  normal respiratory variation in diameter. Estimated mean right atrial pressure  is 8 mmHg (mildly elevated).     Pericardium  No pericardial effusion is present.        Compared to Previous Study  Previous study not available for comparison.  _____________________________________________________________________________  __  MMode/2D Measurements &  Calculations  IVSd: 0.74 cm     LVIDd: 5.2 cm  LVIDs: 4.0 cm  LVPWd: 1.0 cm  FS: 23.3 %  LV mass(C)d: 167.6 grams  LV mass(C)dI: 91.8 grams/m2  asc Aorta Diam: 3.6 cm  LVOT diam: 2.1 cm  LVOT area: 3.5 cm2  LA Volume (BP): 81.1 ml  LA Volume Index (BP): 44.3 ml/m2  RWT: 0.40           Doppler Measurements & Calculations  MV E max sasha: 101.0 cm/sec  MV A max sasha: 16.5 cm/sec  MV E/A: 6.1  MV dec slope: 660.0 cm/sec2  PA V2 max: 96.5 cm/sec  PA max PG: 3.7 mmHg  PA acc time: 0.11 sec  TR max sasha: 255.0 cm/sec  TR max P.0 mmHg  E/E' av.8  Lateral E/e': 11.0  Medial E/e': 34.5     _____________________________________________________________________________  __           Report approved by: LEÓN Powell 2018 02:05 PM                     RHC 18    Right Heart Catheterization:  /95/112  HR 61  BSA 1.91     RA    RV   PA 18   PCW 15/13/12   William CO 3.7   William CI 2.0   TD CO 4.6   TD CI 2.5   PA sat 51.8%  PCW sat 95.6%   Hgb 9.1 g/dL   PVR 1.6  SVR 4.8      Device check Patient has an appointment to see RADHA Garcia today. Normal ICD function. 1 episodes recorded as NSVT, available EGM shows irregular R-R with rates of  bpm, with this being a single chamber device it makes it difficult to determine if the rhythm is NSVT or Afib with RVR. Pt reports being on Coumadin.  Intrinsic rhythm = Regular VS @ 65 bpm.  = <1 %. Estimated battery longevity to QUIQUE = 12 years. Lead trends appear stable      Assessment and Plan: 58 year old male with a past medical history of CAD (s/p STEMI with ALYSSA to LAD 18), monomorphic VT s/p ICD shock times four 18, LV thrombus, CKD Stage III, PAF, DM Type II, and ICM with EF 20-25%who is now s/p HeartMate 3 LVAD implant 18. RHC yesterday showed normal filling pressures and CO/CI 4.6/2.5    Chronic systolic heart failure secondary to ICM s/p HM3 18  Stage C, NYHA Class IV  ACEi/ARB: on lisinopril- will change the dose to  afternoon and night given his dizziness in the morning after he takes all of his morning pills.  Further afterload reduction with imdur and Hydralazine - discontinue  imdur given dizzy spells   BB  Low dose coreg.   SCD prophylaxis ICD.      CAD. s/p STEMI with ALYSSA to LAD 6/27/18  - Continue Crestor and BB    PAF. CHADSVASC-4.   - Coumadin per AC.   - Continue BB, amiodarone.     VT/VF. With cardiac arrest.   - No recent ICD shocks  - Last device interrogation only one episode of NSVT unable to determine if this is AF with RVR    LV thrombus.   - Coumadin as above.     CKD Stage III.   - Currently anjum     - Hematuria  - will refer him to urology    Seen and discussed with Dr. Ortiz who agrees with the above assessment and plan        I have reviewed today's vital signs, notes, medications, labs and imaging. I have also seen and examined the patient and agree with the findings and plan as outlined above.    Jenni Ortiz MD  Section Head - Advanced Heart Failure, Transplantation and Mechanical Circulatory Support  Director - Adult Congenital and Cardiovascular Genetics Center  Associate Professor of Medicine, [facility]

## 2019-02-18 ENCOUNTER — CARE COORDINATION (OUTPATIENT)
Dept: CARDIOLOGY | Facility: CLINIC | Age: 59
End: 2019-02-18

## 2019-02-18 ENCOUNTER — ANTICOAGULATION THERAPY VISIT (OUTPATIENT)
Dept: ANTICOAGULATION | Facility: CLINIC | Age: 59
End: 2019-02-18

## 2019-02-18 DIAGNOSIS — I50.9 HEART FAILURE (H): ICD-10-CM

## 2019-02-18 DIAGNOSIS — I50.22 CHRONIC SYSTOLIC CONGESTIVE HEART FAILURE (H): ICD-10-CM

## 2019-02-18 DIAGNOSIS — I50.22 CHRONIC SYSTOLIC CONGESTIVE HEART FAILURE (H): Primary | ICD-10-CM

## 2019-02-18 DIAGNOSIS — Z95.811 LVAD (LEFT VENTRICULAR ASSIST DEVICE) PRESENT (H): ICD-10-CM

## 2019-02-18 LAB
ALBUMIN SERPL-MCNC: 3.5 G/DL (ref 3.4–5)
ALP SERPL-CCNC: 68 U/L (ref 40–150)
ALT SERPL W P-5'-P-CCNC: 38 U/L (ref 0–70)
ANION GAP SERPL CALCULATED.3IONS-SCNC: 8 MMOL/L (ref 3–14)
AST SERPL W P-5'-P-CCNC: 27 U/L (ref 0–45)
BILIRUB SERPL-MCNC: 0.6 MG/DL (ref 0.2–1.3)
BUN SERPL-MCNC: 30 MG/DL (ref 7–30)
CALCIUM SERPL-MCNC: 8.2 MG/DL (ref 8.5–10.1)
CHLORIDE SERPL-SCNC: 108 MMOL/L (ref 94–109)
CO2 SERPL-SCNC: 22 MMOL/L (ref 20–32)
CREAT SERPL-MCNC: 1.34 MG/DL (ref 0.66–1.25)
ERYTHROCYTE [DISTWIDTH] IN BLOOD BY AUTOMATED COUNT: 16.5 % (ref 10–15)
GFR SERPL CREATININE-BSD FRML MDRD: 59 ML/MIN/{1.73_M2}
GLUCOSE SERPL-MCNC: 272 MG/DL (ref 70–99)
HCT VFR BLD AUTO: 34.2 % (ref 40–53)
HGB BLD-MCNC: 10.3 G/DL (ref 13.3–17.7)
INR PPP: 2.7 (ref 0.86–1.14)
LDH SERPL L TO P-CCNC: 132 U/L (ref 85–227)
MCH RBC QN AUTO: 27.4 PG (ref 26.5–33)
MCHC RBC AUTO-ENTMCNC: 30.1 G/DL (ref 31.5–36.5)
MCV RBC AUTO: 91 FL (ref 78–100)
PLATELET # BLD AUTO: 269 10E9/L (ref 150–450)
POTASSIUM SERPL-SCNC: 4.2 MMOL/L (ref 3.4–5.3)
PROT SERPL-MCNC: 7.1 G/DL (ref 6.8–8.8)
RBC # BLD AUTO: 3.76 10E12/L (ref 4.4–5.9)
SODIUM SERPL-SCNC: 138 MMOL/L (ref 133–144)
WBC # BLD AUTO: 5.3 10E9/L (ref 4–11)

## 2019-02-18 NOTE — PROGRESS NOTES
Called pt to check in this am. He reports still having approx 6 low flow alarms each day. They cease with eating or drinking. Pt denies sizziness or lightheadedness. Confirmed appt's for [facility] tomorrow and with Dr. Ortiz on Wednesday.

## 2019-02-18 NOTE — PROGRESS NOTES
ANTICOAGULATION FOLLOW-UP CLINIC VISIT    Patient Name:  Mariusz Sharp  Date:  2019  Contact Type:  Telephone    SUBJECTIVE:     Patient Findings     Positives:   No Problem Findings           OBJECTIVE    INR   Date Value Ref Range Status   2019 2.70 (H) 0.86 - 1.14 Final     Chromogenic Factor 10   Date Value Ref Range Status   2019 72 70 - 130 % Final     Comment:     Therapeutic Range:  A Chromogenic Factor 10 level of approximately 20-40%   inversely correlates with an INR of 2-3 for patients receiving Warfarin.   Chromogenic Factor 10 levels below 20% indicate an INR greater than 3 and   levels above 40% indicate an INR less than 2.         ASSESSMENT / PLAN  INR assessment THER    Recheck INR In: 2 DAYS    INR Location Clinic      Anticoagulation Summary  As of 2019    INR goal:   2.0-3.0   TTR:   63.3 % (4 wk)   INR used for dosin.70 (2019)   Warfarin maintenance plan:   No maintenance plan   Full warfarin instructions:   : 5 mg; : 7.5 mg   Plan last modified:   Chasity Gonzalez RN (2019)   Next INR check:   2019   Priority:   INR   Target end date:   Indefinite    Indications    Heart failure (H) [I50.9]  LVAD (left ventricular assist device) present (H) [Z95.811]             Anticoagulation Episode Summary     INR check location:       Preferred lab:       Send INR reminders to:   [facility]    Comments:   LVAD implanted  ASA 81mg Daily Has 2.5mg tablets   19 Staying at [facility]        Anticoagulation Care Providers     Provider Role Specialty Phone number    Jenni Ortiz MD [facility] Cardiology [phone]            See the Encounter Report to view Anticoagulation Flowsheet and Dosing Calendar (Go to Encounters tab in chart review, and find the Anticoagulation Therapy Visit)    Spoke with patient. He is having a RHC tomorrow and has an appointment with his physician on     Tom Soto [facility]

## 2019-02-19 ENCOUNTER — APPOINTMENT (OUTPATIENT)
Dept: MEDSURG UNIT | Facility: CLINIC | Age: 59
End: 2019-02-19
Attending: INTERNAL MEDICINE
Payer: COMMERCIAL

## 2019-02-19 ENCOUNTER — HOSPITAL ENCOUNTER (OUTPATIENT)
Facility: CLINIC | Age: 59
Discharge: HOME | End: 2019-02-19
Attending: INTERNAL MEDICINE | Admitting: INTERNAL MEDICINE
Payer: COMMERCIAL

## 2019-02-19 VITALS
BODY MASS INDEX: 30.49 KG/M2 | WEIGHT: 178.6 LBS | TEMPERATURE: 98 F | RESPIRATION RATE: 20 BRPM | OXYGEN SATURATION: 96 % | DIASTOLIC BLOOD PRESSURE: 89 MMHG | HEART RATE: 52 BPM | SYSTOLIC BLOOD PRESSURE: 125 MMHG | HEIGHT: 64 IN

## 2019-02-19 DIAGNOSIS — Z95.811 LVAD (LEFT VENTRICULAR ASSIST DEVICE) PRESENT (H): ICD-10-CM

## 2019-02-19 DIAGNOSIS — I50.22 CHRONIC SYSTOLIC CONGESTIVE HEART FAILURE (H): ICD-10-CM

## 2019-02-19 LAB
B-HCG FREE SERPL-ACNC: 2.6 [IU]/L (ref 0.86–1.14)
GLUCOSE BLDC GLUCOMTR-MCNC: 233 MG/DL (ref 70–99)

## 2019-02-19 PROCEDURE — 25000125 ZZHC RX 250: Performed by: INTERNAL MEDICINE

## 2019-02-19 PROCEDURE — C1894 INTRO/SHEATH, NON-LASER: HCPCS | Performed by: INTERNAL MEDICINE

## 2019-02-19 PROCEDURE — 93451 RIGHT HEART CATH: CPT | Mod: 26 | Performed by: INTERNAL MEDICINE

## 2019-02-19 PROCEDURE — 40000166 ZZH STATISTIC PP CARE STAGE 1

## 2019-02-19 PROCEDURE — 93451 RIGHT HEART CATH: CPT | Performed by: INTERNAL MEDICINE

## 2019-02-19 PROCEDURE — 27210794 ZZH OR GENERAL SUPPLY STERILE: Performed by: INTERNAL MEDICINE

## 2019-02-19 PROCEDURE — 85610 PROTHROMBIN TIME: CPT

## 2019-02-19 PROCEDURE — 82962 GLUCOSE BLOOD TEST: CPT

## 2019-02-19 RX ORDER — LIDOCAINE 40 MG/G
CREAM TOPICAL
Status: COMPLETED | OUTPATIENT
Start: 2019-02-19 | End: 2019-02-19

## 2019-02-19 RX ORDER — LIDOCAINE HYDROCHLORIDE 10 MG/ML
INJECTION, SOLUTION EPIDURAL; INFILTRATION; INTRACAUDAL; PERINEURAL
Status: DISCONTINUED | OUTPATIENT
Start: 2019-02-19 | End: 2019-02-19 | Stop reason: HOSPADM

## 2019-02-19 RX ADMIN — LIDOCAINE: 40 CREAM TOPICAL at 13:34

## 2019-02-19 ASSESSMENT — MIFFLIN-ST. JEOR: SCORE: 1551.12

## 2019-02-19 NOTE — IP AVS SNAPSHOT
[facility], [facility],  Heart Cath Lab  [address] [address]  Phone:  [phone]                                    After Visit Summary   2/19/2019    Mariusz Sharp    MRN: [id number]           After Visit Summary Signature Page    I have received my discharge instructions, and my questions have been answered. I have discussed any challenges I see with this plan with the nurse or doctor.    ..........................................................................................................................................  Patient/Patient Representative Signature      ..........................................................................................................................................  Patient Representative Print Name and Relationship to Patient    ..................................................               ................................................  Date                                   Time    ..........................................................................................................................................  Reviewed by Signature/Title    ...................................................              ..............................................  Date                                               Time          22EPIC Rev 08/18

## 2019-02-19 NOTE — PROCEDURES
PROCEDURE: Right heart catheterization    PHYSICIANS:  1. Attending Interventional Cardiology Staff: Brian Long MD  2. Interventional Cardiology Fellow: Lopez Li MD     HPI:  Mariusz Sharp is a 58 year old male with history including ischemic cardiomyopathy s/p HeartMate III LVAD 12/31/18 who presents on an elective outpatient basis for hemodynamic assessment.     DESCRIPTION:   Venous Location: right internal jugular vein   Access: Local anesthetic with lidocaine.  A standard (18 g) needle with ultrasound guidance was used to establish venous access.  Venous Sheath:7F standard sheath   Catheters: 7F Redmond Calvin PA catheter    Right Heart Catheterization:  /95/112  HR 61  BSA 1.91    RA 17/13/11   RV 30/11  PA 30/12/18   PCW 15/13/12   William CO 3.7   William CI 2.0   TD CO 4.6   TD CI 2.5   PA sat 51.8%  PCW sat 95.6%   Hgb 9.1 g/dL   PVR 1.6  SVR 4.8    COMPLICATIONS:   None    IMPRESSION:   1. Normal right-sided and normal left-sided filling pressures.   2. Normal pulmonary artery pressures with a mean pulmonary artery pressure of 18 mmHg.  3. Decreased cardiac output (3.7 L/min) and cardiac index (2.0)    PLAN:   1. Continued management of ischemic cardiomyopathy s/p LVAD per Dr. Ortiz.    Findings discussed with primary cardiology attending Dr. Ortiz.     See CVIS report for final draft.      Lopez Li MD  Interventional Cardiology Fellow    I was present for the entire procedure.     Brian Long M.D.  Interventional Cardiology  [facility]  Pager: [phone]

## 2019-02-19 NOTE — PROGRESS NOTES
1400  Prep is complete for RHC.  Denies any pain.  I-stat INR is 2.60.  He had 5mg of Coumadin last evening, 2/18/19.  Red is here alone.  LVAD is in place.  CTRN

## 2019-02-19 NOTE — IP AVS SNAPSHOT
MRN:[id number]                      After Visit Summary   2/19/2019    Mariusz Sharp    MRN: [id number]           Visit Information        Department      2/19/2019 11:42 AM [facility], [place],  Heart Cath Lab          Review of your medicines      UNREVIEWED medicines. Ask your doctor about these medicines       Dose / Directions   acetaminophen 325 MG tablet  Commonly known as:  TYLENOL  Used for:  LVAD (left ventricular assist device) present (H)  Ask about: Should I take this medication?      Dose:  650 mg  Take 2 tablets (650 mg) by mouth every 6 hours as needed for pain  Quantity:  1 Bottle  Refills:  0     amiodarone 200 MG tablet  Commonly known as:  PACERONE/CODARONE  Used for:  Acute systolic heart failure (H)      Dose:  200 mg  Take 1 tablet (200 mg) by mouth daily  Quantity:  90 tablet  Refills:  3     aspirin 81 MG chewable tablet  Commonly known as:  ASA  Used for:  LVAD (left ventricular assist device) present (H)      Dose:  81 mg  Take 1 tablet (81 mg) by mouth daily  Quantity:  90 tablet  Refills:  3     carvedilol 3.125 MG tablet  Commonly known as:  COREG  Used for:  LVAD (left ventricular assist device) present (H), Chronic systolic congestive heart failure (H)      Dose:  6.25 mg  Take 2 tablets (6.25 mg) by mouth 2 times daily (with meals)  Quantity:  60 tablet  Refills:  11     digoxin 125 MCG tablet  Commonly known as:  LANOXIN  Used for:  Acute systolic heart failure (H)      Dose:  125 mcg  Take 1 tablet (125 mcg) by mouth daily  Quantity:  90 tablet  Refills:  3     famotidine 20 MG tablet  Commonly known as:  PEPCID  Used for:  Acute on chronic systolic heart failure (H)      Dose:  20 mg  Take 1 tablet (20 mg) by mouth 2 times daily  Quantity:  180 tablet  Refills:  3     hydrALAZINE 50 MG tablet  Commonly known as:  APRESOLINE  Used for:  LVAD (left ventricular assist device) present (H), Chronic systolic heart failure (H), Chronic systolic congestive heart failure  (H)      Dose:  100 mg  Take 2 tablets (100 mg) by mouth 3 times daily  Quantity:  100 tablet  Refills:  11     * insulin aspart 100 UNIT/ML pen  Commonly known as:  NovoLOG PEN  Used for:  Acute on chronic systolic heart failure (H)      Dose:  1-10 Units  Inject 1-10 Units Subcutaneous 3 times daily (before meals) Correction Scale - HIGH INSULIN RESISTANCE DOSING     -164 =1 units.   -189 =2.   -214 =3.   -239 =4.   -264 =5.   -289 =6.   -314 =7.   -339 =8.   -364 =9.  BG greater than or equal to 365 give 10 units  To be given with meal insulin, based on pre-meal BG  Quantity:  9 mL  Refills:  0     * insulin aspart 100 UNIT/ML pen  Commonly known as:  NovoLOG PEN  Used for:  Acute on chronic systolic heart failure (H)      Dose:  1-7 Units  Inject 1-7 Units Subcutaneous At Bedtime HIGH INSULIN RESISTANCE DOSING    Bedtime  For  - 224 give 1 units.   For  - 249 give 2 units.   For  - 274 give 3 units.   For  - 299 give 4 units.   For  - 324 give 5 units.   For  - 349 give 6 units.   For BG greater than or equal to 350 give 7 units.  Quantity:  2.1 mL  Refills:  0     * insulin aspart 100 UNIT/ML pen  Commonly known as:  NovoLOG PEN  Used for:  Acute on chronic systolic heart failure (H)      Prandial Dosin units per 10 grams of carbohydrate each Meal or Snack.  Only chart total amount of units given.  Do not give if pre-prandial glucose is less than 60 mg/dL. If given at mealtime, administer within 30 minutes of start of meal.  Refills:  0     * insulin glargine 100 UNIT/ML pen  Commonly known as:  LANTUS PEN      Dose:  20 Units  Inject 20 Units Subcutaneous At Bedtime  Refills:  0     * insulin glargine 100 UNIT/ML pen  Commonly known as:  LANTUS PEN  Used for:  Acute on chronic systolic heart failure (H)      Dose:  20 Units  Inject 20 Units Subcutaneous At Bedtime  Quantity:  6 mL  Refills:  0     isosorbide  mononitrate 30 MG 24 hr tablet  Commonly known as:  IMDUR  Used for:  Acute on chronic systolic heart failure (H)      Dose:  30 mg  Take 1 tablet (30 mg) by mouth daily  Quantity:  90 tablet  Refills:  3     lisinopril 2.5 MG tablet  Commonly known as:  PRINIVIL/Zestril  Used for:  LVAD (left ventricular assist device) present (H), Chronic systolic congestive heart failure (H), Chronic systolic heart failure (H)      Dose:  5 mg  Take 2 tablets (5 mg) by mouth 2 times daily  Quantity:  120 tablet  Refills:  11     nitroGLYcerin 0.4 MG sublingual tablet  Commonly known as:  NITROSTAT  Used for:  Acute on chronic systolic heart failure (H)      Dose:  0.4 mg  Place 1 tablet (0.4 mg) under the tongue every 5 minutes as needed for chest pain For chest pain place 1 tablet under the tongue every 5 minutes for 3 doses. If symptoms persist 5 minutes after 1st dose call 911.  Quantity:  10 tablet  Refills:  0     rosuvastatin 20 MG tablet  Commonly known as:  CRESTOR  Used for:  Acute systolic heart failure (H)      Dose:  20 mg  Take 1 tablet (20 mg) by mouth At Bedtime  Quantity:  90 tablet  Refills:  3     senna-docusate 8.6-50 MG tablet  Commonly known as:  SENOKOT-S/PERICOLACE  Used for:  LVAD (left ventricular assist device) present (H)  Ask about: Should I take this medication?      Dose:  1 tablet  Take 1 tablet by mouth daily as needed for constipation hold for loose stools  Quantity:  60 tablet  Refills:  0     * warfarin 2.5 MG tablet  Commonly known as:  COUMADIN  Used for:  Acute on chronic systolic heart failure (H)      Take as directed. If you are unsure how to take this medication, talk to your nurse or doctor.  Original instructions:  To take dose as directed by your INR clinic  Quantity:  270 tablet  Refills:  3     * warfarin 2.5 MG tablet  Commonly known as:  COUMADIN  Used for:  LVAD (left ventricular assist device) present (H)      Take as directed. If you are unsure how to take this medication, talk  to your nurse or doctor.  Original instructions:  Take 5mg TuThSat and 7.5mg MWFSun, or as directed by the Medication Monitoring Clinic at the [facility].  Quantity:  250 tablet  Refills:  1         * This list has 7 medication(s) that are the same as other medications prescribed for you. Read the directions carefully, and ask your doctor or other care provider to review them with you.                  Protect others around you: Learn how to safely use, store and throw away your medicines at www.disposemymeds.org.       Follow-ups after your visit       Your next 10 appointments already scheduled    Feb 19, 2019  Procedure with Brian Long MD  [facility], [place],  Heart Cath Lab ([facility]) [address] [address]  [phone]   [facility] is located on the corner of [address] on the [facility] [facility]. It is easily accessible from virtually any point in the [facility] area, via [address] and [address].   Feb 20, 2019  8:00 AM CST  Lab with  LAB  [facility] Lab ([facility]) [address] [place] [address]  [address] [facility] [address]  [phone]   Feb 20, 2019  8:30 AM CST  (Arrive by 8:15 AM)  Ventricular Assist Device with Jenni Ortiz MD  [facility] Heart Care [facility]) [address] [place] [address]  [phone]   Mar 27, 2019  1:00 PM CDT  Lab with [facility] LAB  [facility] Lab ([facility]) [address] [place] [address]  [address] [facility] [address]  [phone]   Mar 27, 2019  1:30 PM CDT  (Arrive by 1:15 PM)  CARDIAC DEVICE CHECK - IN CLINIC with  CV DEVICE 1  [facility] Cardiac Services ([facility]) [address] [place] [address]  3rd Floor  [facility] [address]  [phone]   Mar 27, 2019  2:00 PM CDT  (Arrive by 1:45 PM)  Ventricular Assist Device with Meredith Garcia  NP  [facility] ([facility]) [address]  [address]  [facility] [address]  [phone]   Apr 24, 2019  2:30 PM CDT  Lab with UC LAB  [facility] Lab [facility]) [address] [place] [address]  1st Floor  [facility] [address]  [phone]   Apr 24, 2019  3:00 PM CDT  (Arrive by 2:45 PM)  Ventricular Assist Device with Jenni Ortiz MD  [facility] ([facility]) [address] [place] [address]  [address]  [facility] [address]  [phone]   Jun 26, 2019 10:00 AM CDT  Lab with UC LAB  [facility] Lab ([facility]) [address] [place] [address]  1st Floor  [facility] [address]  [phone]   Jun 26, 2019 10:30 AM CDT  (Arrive by 10:15 AM)  CARDIAC DEVICE CHECK - IN CLINIC with  CV DEVICE 1  [facility] ([facility]) [address] [place] [address]  3rd Floor  [facility] [address]  [phone]      Care Instructions       Further instructions from your care team       [facility]                        Interventional Cardiology  Discharge Instructions   Post Right Heart Cath      AFTER YOU GO HOME:    DO drink plenty of fluids    DO resume your regular diet and medications unless otherwise instructed by your Primary Physician    Do Not scrub the procedure site vigorously    No lotion or powder to the puncture site for 3 days    CALL YOUR PRIMARY PHYSICIAN IF: You may resume all normal activity.  Monitor neck site for bleeding, swelling, or voice changes. If you notice bleeding or swelling immediately apply pressure to the site and call number below to speak with Cardiology Fellow.  If you experience any changes in your breathing you should call your doctor immediately or come to the closest Emergency Department.  Do not drive yourself.    ADDITIONAL INSTRUCTIONS: Medications: You are to resume all home medications including anticoagulation therapy  "unless otherwise advised by your primary cardiologist or nurse coordinator.    Follow Up: Per your primary cardiology team    If you have any questions or concerns regarding your procedure site please call [phone] at anytime and ask for Cardiology Fellow on call.  They are available 24 hours a day.  You may also contact the Cardiology Clinic after hours number at [phone].                                                       Telephone Numbers [phone] Monday-Friday 8:00 am to 4:30 pm    [phone] After 4:30 pm Monday-Friday, Weekends & Holidays  Ask for Interventional Cardiologist on call. Someone is on call 24 hours/day   [facility] toll free number [phone] Monday-Friday 8:00 am to 4:30 pm   [facility] Emergency Dept [phone]                   Additional Information About Your Visit       [employer] Information    [employer] lets you send messages to your doctor, view your test results, renew your prescriptions, schedule appointments and more. To sign up, go to [email or web address] . Click on \"Log in\" on the left side of the screen, which will take you to the Welcome page. Then click on \"Sign up Now\" on the right side of the page.     You will be asked to enter the access code listed below, as well as some personal information. Please follow the directions to create your username and password.     Your access code is: [email or web address]  Expires: 2019 11:22 AM     Your access code will  in 90 days. If you need help or a new code, please call your [facility] clinic or [phone].       Care EveryWhere ID    This is your Care EveryWhere ID. This could be used by other organizations to access your [facility] medical records  [id number]       Your Vitals Were     Blood Pressure   116/88 (BP Location: Right arm)          Pulse   52          Temperature   98  F (36.7  C)          Respirations   16          Height   1.626 m (5' 4\")             Weight   81 kg (178 lb 9.6 oz)    Pulse " Oximetry   96%    BMI (Body Mass Index)   30.66 kg/m           Primary Care Provider Office Phone # Fax #    Like MD Aristides [phone]      Equal Access to Services    KARTIKHUGO FARIASLEROY : Olena can weaver sarthak French, watierada luqadaha, qaybta kaalmada bessie, mynor win marthagalileo gordon laLillieel edwige. So [facility] [phone].    ATENCIÓN: Si habla español, tiene a waddell disposición servicios gratuitos de asistencia lingüística. Llame al [phone].    We comply with applicable federal civil rights laws and [place] laws. We do not discriminate on the basis of race, color, national origin, age, disability, sex, sexual orientation, or gender identity.           Thank you!    Thank you for choosing [facility] for your care. Our goal is always to provide you with excellent care. Hearing back from our patients is one way we can continue to improve our services. Please take a few minutes to complete the written survey that you may receive in the mail after you visit with us. Thank you!            Medication List      ASK your doctor about these medications          Morning Afternoon Evening Bedtime As Needed    acetaminophen 325 MG tablet  Also known as:  TYLENOL  INSTRUCTIONS:  Take 2 tablets (650 mg) by mouth every 6 hours as needed for pain  Ask about: Should I take this medication?                     amiodarone 200 MG tablet  Also known as:  PACERONE/CODARONE  INSTRUCTIONS:  Take 1 tablet (200 mg) by mouth daily                     aspirin 81 MG chewable tablet  Also known as:  ASA  INSTRUCTIONS:  Take 1 tablet (81 mg) by mouth daily                     carvedilol 3.125 MG tablet  Also known as:  COREG  INSTRUCTIONS:  Take 2 tablets (6.25 mg) by mouth 2 times daily (with meals)                     digoxin 125 MCG tablet  Also known as:  LANOXIN  INSTRUCTIONS:  Take 1 tablet (125 mcg) by mouth daily                     famotidine 20 MG tablet  Also known as:  PEPCID  INSTRUCTIONS:  Take 1 tablet (20 mg) by  mouth 2 times daily                     hydrALAZINE 50 MG tablet  Also known as:  APRESOLINE  INSTRUCTIONS:  Take 2 tablets (100 mg) by mouth 3 times daily                     * insulin aspart 100 UNIT/ML pen  Also known as:  NovoLOG PEN  INSTRUCTIONS:  Inject 1-10 Units Subcutaneous 3 times daily (before meals) Correction Scale - HIGH INSULIN RESISTANCE DOSING     -164 =1 units.   -189 =2.   -214 =3.   -239 =4.   -264 =5.   -289 =6.   -314 =7.   -339 =8.   -364 =9.  BG greater than or equal to 365 give 10 units  To be given with meal insulin, based on pre-meal BG                     * insulin aspart 100 UNIT/ML pen  Also known as:  NovoLOG PEN  INSTRUCTIONS:  Inject 1-7 Units Subcutaneous At Bedtime HIGH INSULIN RESISTANCE DOSING    Bedtime  For  - 224 give 1 units.   For  - 249 give 2 units.   For  - 274 give 3 units.   For  - 299 give 4 units.   For  - 324 give 5 units.   For  - 349 give 6 units.   For BG greater than or equal to 350 give 7 units.                     * insulin aspart 100 UNIT/ML pen  Also known as:  NovoLOG PEN  INSTRUCTIONS:  Prandial Dosin units per 10 grams of carbohydrate each Meal or Snack.  Only chart total amount of units given.  Do not give if pre-prandial glucose is less than 60 mg/dL. If given at mealtime, administer within 30 minutes of start of meal.                     * insulin glargine 100 UNIT/ML pen  Also known as:  LANTUS PEN  INSTRUCTIONS:  Inject 20 Units Subcutaneous At Bedtime                     * insulin glargine 100 UNIT/ML pen  Also known as:  LANTUS PEN  INSTRUCTIONS:  Inject 20 Units Subcutaneous At Bedtime                     isosorbide mononitrate 30 MG 24 hr tablet  Also known as:  IMDUR  INSTRUCTIONS:  Take 1 tablet (30 mg) by mouth daily                     lisinopril 2.5 MG tablet  Also known as:  PRINIVIL/Zestril  INSTRUCTIONS:  Take 2 tablets (5 mg) by mouth 2 times  daily                     nitroGLYcerin 0.4 MG sublingual tablet  Also known as:  NITROSTAT  INSTRUCTIONS:  Place 1 tablet (0.4 mg) under the tongue every 5 minutes as needed for chest pain For chest pain place 1 tablet under the tongue every 5 minutes for 3 doses. If symptoms persist 5 minutes after 1st dose call 911.                     rosuvastatin 20 MG tablet  Also known as:  CRESTOR  INSTRUCTIONS:  Take 1 tablet (20 mg) by mouth At Bedtime                     senna-docusate 8.6-50 MG tablet  Also known as:  SENOKOT-S/PERICOLACE  INSTRUCTIONS:  Take 1 tablet by mouth daily as needed for constipation hold for loose stools  Ask about: Should I take this medication?                     * warfarin 2.5 MG tablet  Also known as:  COUMADIN  Take as directed. If you are unsure how to take this medication, talk to your nurse or doctor.  Original instructions:  To take dose as directed by your INR clinic                     * warfarin 2.5 MG tablet  Also known as:  COUMADIN  Take as directed. If you are unsure how to take this medication, talk to your nurse or doctor.  Original instructions:  Take 5mg TuThSat and 7.5mg MWFSun, or as directed by the Medication Monitoring Clinic at the [facility].  Doctor's comments:  Dr. Jenni Ortiz/Garth RN -per collaborative practice agreement.                        * This list has 7 medication(s) that are the same as other medications prescribed for you. Read the directions carefully, and ask your doctor or other care provider to review them with you.

## 2019-02-19 NOTE — PROGRESS NOTES
Patient tolerated recovery stage well. VSS, RIJV site clean/dry/intact, no hematoma, and denies pain.  LVAD in place.  Teaching was done and discharge instructions were given.  Patient discharged from the hospital via ambulatory to home per self.

## 2019-02-19 NOTE — DISCHARGE INSTRUCTIONS
[facility]                        Interventional Cardiology  Discharge Instructions   Post Right Heart Cath      AFTER YOU GO HOME:    DO drink plenty of fluids    DO resume your regular diet and medications unless otherwise instructed by your Primary Physician    Do Not scrub the procedure site vigorously    No lotion or powder to the puncture site for 3 days    CALL YOUR PRIMARY PHYSICIAN IF: You may resume all normal activity.  Monitor neck site for bleeding, swelling, or voice changes. If you notice bleeding or swelling immediately apply pressure to the site and call number below to speak with Cardiology Fellow.  If you experience any changes in your breathing you should call your doctor immediately or come to the closest Emergency Department.  Do not drive yourself.    ADDITIONAL INSTRUCTIONS: Medications: You are to resume all home medications including anticoagulation therapy unless otherwise advised by your primary cardiologist or nurse coordinator.    Follow Up: Per your primary cardiology team    If you have any questions or concerns regarding your procedure site please call [phone] at anytime and ask for Cardiology Fellow on call.  They are available 24 hours a day.  You may also contact the Cardiology Clinic after hours number at [phone].                                                       Telephone Numbers [phone] Monday-Friday 8:00 am to 4:30 pm    [phone] After 4:30 pm Monday-Friday, Weekends & Holidays  Ask for Interventional Cardiologist on call. Someone is on call 24 hours/day   [facility] toll free number [phone] Monday-Friday 8:00 am to 4:30 pm   [facility] Emergency Dept [phone]

## 2019-02-20 ENCOUNTER — OFFICE VISIT (OUTPATIENT)
Dept: CARDIOLOGY | Facility: CLINIC | Age: 59
End: 2019-02-20
Attending: INTERNAL MEDICINE
Payer: COMMERCIAL

## 2019-02-20 ENCOUNTER — ANTICOAGULATION THERAPY VISIT (OUTPATIENT)
Dept: ANTICOAGULATION | Facility: CLINIC | Age: 59
End: 2019-02-20

## 2019-02-20 VITALS
OXYGEN SATURATION: 97 % | HEART RATE: 32 BPM | HEIGHT: 64 IN | BODY MASS INDEX: 32.61 KG/M2 | WEIGHT: 191 LBS | TEMPERATURE: 97.7 F | SYSTOLIC BLOOD PRESSURE: 68 MMHG

## 2019-02-20 DIAGNOSIS — I50.22 CHRONIC SYSTOLIC CONGESTIVE HEART FAILURE (H): ICD-10-CM

## 2019-02-20 DIAGNOSIS — I50.22 CHRONIC SYSTOLIC HEART FAILURE (H): ICD-10-CM

## 2019-02-20 DIAGNOSIS — I50.9 HEART FAILURE (H): ICD-10-CM

## 2019-02-20 DIAGNOSIS — Z95.811 LVAD (LEFT VENTRICULAR ASSIST DEVICE) PRESENT (H): ICD-10-CM

## 2019-02-20 DIAGNOSIS — Z95.811 LVAD (LEFT VENTRICULAR ASSIST DEVICE) PRESENT (H): Primary | ICD-10-CM

## 2019-02-20 DIAGNOSIS — Z79.01 LONG TERM (CURRENT) USE OF ANTICOAGULANTS: ICD-10-CM

## 2019-02-20 DIAGNOSIS — I50.23 ACUTE ON CHRONIC SYSTOLIC HEART FAILURE (H): ICD-10-CM

## 2019-02-20 LAB
ALBUMIN UR-MCNC: 100 MG/DL
APPEARANCE UR: ABNORMAL
BACTERIA #/AREA URNS HPF: ABNORMAL /HPF
BILIRUB UR QL STRIP: NEGATIVE
COLOR UR AUTO: YELLOW
GLUCOSE UR STRIP-MCNC: 50 MG/DL
HGB UR QL STRIP: ABNORMAL
INR PPP: 2.72 (ref 0.86–1.14)
KETONES UR STRIP-MCNC: NEGATIVE MG/DL
LEUKOCYTE ESTERASE UR QL STRIP: NEGATIVE
MUCOUS THREADS #/AREA URNS LPF: PRESENT /LPF
NITRATE UR QL: NEGATIVE
PH UR STRIP: 5 PH (ref 5–7)
RBC #/AREA URNS AUTO: >182 /HPF (ref 0–2)
SOURCE: ABNORMAL
SP GR UR STRIP: 1.02 (ref 1–1.03)
UROBILINOGEN UR STRIP-MCNC: 0 MG/DL (ref 0–2)
WBC #/AREA URNS AUTO: 7 /HPF (ref 0–5)

## 2019-02-20 PROCEDURE — G0463 HOSPITAL OUTPT CLINIC VISIT: HCPCS | Mod: 25,ZF

## 2019-02-20 PROCEDURE — 81001 URINALYSIS AUTO W/SCOPE: CPT | Performed by: INTERNAL MEDICINE

## 2019-02-20 PROCEDURE — 85610 PROTHROMBIN TIME: CPT | Performed by: INTERNAL MEDICINE

## 2019-02-20 PROCEDURE — 36415 COLL VENOUS BLD VENIPUNCTURE: CPT | Performed by: INTERNAL MEDICINE

## 2019-02-20 PROCEDURE — 99214 OFFICE O/P EST MOD 30 MIN: CPT | Mod: GC | Performed by: INTERNAL MEDICINE

## 2019-02-20 PROCEDURE — 93750 INTERROGATION VAD IN PERSON: CPT | Mod: ZF | Performed by: INTERNAL MEDICINE

## 2019-02-20 ASSESSMENT — MIFFLIN-ST. JEOR: SCORE: 1607.37

## 2019-02-20 ASSESSMENT — PAIN SCALES - GENERAL: PAINLEVEL: NO PAIN (0)

## 2019-02-20 NOTE — NURSING NOTE
Chief Complaint   Patient presents with     Follow Up     7 weel Post VAD implant F/U     Vitals were taken and medications were reconciled.    8:23 AM Garrett Lopez, Student CMA

## 2019-02-20 NOTE — PROGRESS NOTES
ANTICOAGULATION FOLLOW-UP CLINIC VISIT    Patient Name:  Mariusz Sharp  Date:  2019  Contact Type:  Telephone    SUBJECTIVE:     Patient Findings     Comments:   Writer was thinking 5mg TuThSa and 7.5mg all other days.            OBJECTIVE    INR   Date Value Ref Range Status   2019 2.72 (H) 0.86 - 1.14 Final     Chromogenic Factor 10   Date Value Ref Range Status   2019 72 70 - 130 % Final     Comment:     Therapeutic Range:  A Chromogenic Factor 10 level of approximately 20-40%   inversely correlates with an INR of 2-3 for patients receiving Warfarin.   Chromogenic Factor 10 levels below 20% indicate an INR greater than 3 and   levels above 40% indicate an INR less than 2.         ASSESSMENT / PLAN  No question data found.  Anticoagulation Summary  As of 2019    INR goal:   2.0-3.0   TTR:   65.5 % (1 mo)   INR used for dosin.72 (2019)   Warfarin maintenance plan:   No maintenance plan   Full warfarin instructions:   : 7.5 mg; : 5 mg; : 7.5 mg; : 5 mg; : 7.5 mg   Plan last modified:   Chasity Gonzalez, RN (2019)   Next INR check:   2019   Priority:   INR   Target end date:   Indefinite    Indications    Heart failure (H) [I50.9]  LVAD (left ventricular assist device) present (H) [Z95.811]             Anticoagulation Episode Summary     INR check location:       Preferred lab:       Send INR reminders to:   [facility] CLINIC    Comments:   LVAD implanted  ASA 81mg Daily Has 2.5mg tablets   19 [facility]        Anticoagulation Care Providers     Provider Role Specialty Phone number    Jenni Ortiz MD Responsible Cardiology [phone]            See the Encounter Report to view Anticoagulation Flowsheet and Dosing Calendar (Go to Encounters tab in chart review, and find the Anticoagulation Therapy Visit)    Left message for patient with results and dosing recommendations. Asked patient to call back to report any missed  doses, falls, signs and symptoms of bleeding or clotting, any changes in health, medication, or diet. Asked patient to call back with any questions or concerns.     Sophy Griggs RN

## 2019-02-20 NOTE — NURSING NOTE
1). PUMP DATA  Primary controller serial number: [id number]      HM 3:   Flow: 3.8 L/min,    Speed: 5300 RPMs,     PI: 4.6 ,  Power: 3.8 Parker, Hct: 20 - hematocrit set low to help prevent low flow alarms per Dr. Ortiz.     Primary controller   Back up battery: Patient use: 11, Replace in: 26  Months     Data downloaded: No   Equipment and driveline assessed for damage: Yes     Back up : Serial number: [id number]  Back up battery: Patient use: 7 Replace in: 30  Months  Programmed settings identical to the settings on the primary controller :Yes      Education complete: Yes   Charge the BACKUP controller s backup battery every 6 months  Perform a self test on BACKUP every 6 months  Change the MPU s batteries every 6 months:Yes      2). ALARMS  Alarms reported by patient since last pump evaluation: Yes  Alarms or other finding noted during pump data history and alarm download: Yes, one low flow alarm at 2.4 seen on interrogation.  Frequent PI events, as low as 1.6, with rare to occasional speed drops associated with several PI events in a row.  History only went back about 24 hours.      Action Taken:  Reviewed data with patient: Yes      3). DRESSING CHANGE / DRIVELINE ASSESSMENT  Dressing change completed today: Yes  Appearance of Driveline site: Redness around DLES with small amount of serous drainage. The hub that connects the silicone part of the driveline to the velour part is right where driveline exits skin.  Therefore, appears to be taking prolonged amount of time to heal and form seal.  Instructed patient to continue daily dressing changes and VAD Coord will re-assess driveline at pt's next clinic visit in March.     Driveline stabilization: Method: [employer]  [ Teaching reinforced on need for stabilization of Driveline. ]    4).Pt. Education    D:  Pt education provided.  I:  Pt s with HeartMate educated on the following topics:  1. Reviewed Care of Batteries.  a. When to rotate.  b. When to  calibrate.  c. When they .  d. When to clean Contacts.  2. Reviewed MPU   a. When to change batteries.  3. Reviewed Backup Controller  a. When to charge.  b. When to do self-test.  4. Inspected pt. s wearables  5. Pt given a handout with a Maintenance schedule.  A:  Pt verbalized understanding.  P:  VAD Coordinator available for questions or concerns.  Will continue VAD education.

## 2019-02-20 NOTE — PATIENT INSTRUCTIONS
Medications:  1. Stop taking Isosorbide Mononitrate (IMDUR).  2. Take your Lisinopril at 2pm.    Follow-up:  1. Come back to clinic on 3/27 for labs, device check and appt with DELILAH Harper.  2. See urologist. We will send a referral for you.    Instructions:  1. Dr. Ortiz said it's okay to go home.  2. Marcy will mail you the letter saying that you cannot go through metal detectors.   3.  Continue doing the daily dressing changes.  Call if you notice more drainage.  We will look at your driveline again at your appt in March.  Romeo received education on weekly dressing change today, so whenever VAD Coordinator says you are ready for the weekly dressing, Romeo is trained.  4. Call if you still feel lightheaded or dizzy.    Page the VAD Coordinator on call if you gain more than 3 lb in a day or 5 in a week. Please also page if you feel unwell or have alarms.     Great to see you in clinic today. To Page the VAD Coordinator on call, dial [phone] option #4 and ask to speak to the VAD coordinator on call.

## 2019-02-25 ENCOUNTER — ANTICOAGULATION THERAPY VISIT (OUTPATIENT)
Dept: ANTICOAGULATION | Facility: CLINIC | Age: 59
End: 2019-02-25

## 2019-02-25 DIAGNOSIS — I50.9 HEART FAILURE (H): ICD-10-CM

## 2019-02-25 DIAGNOSIS — Z95.811 LVAD (LEFT VENTRICULAR ASSIST DEVICE) PRESENT (H): ICD-10-CM

## 2019-02-25 LAB — INR PPP: 4.2

## 2019-02-25 NOTE — PROGRESS NOTES
ANTICOAGULATION FOLLOW-UP CLINIC VISIT    Patient Name:  Mariusz Sharp  Date:  2019  Contact Type:  Telephone    SUBJECTIVE:     Patient Findings     Comments:   Patient is now home.           OBJECTIVE    INR   Date Value Ref Range Status   2019 4.2  Final     Chromogenic Factor 10   Date Value Ref Range Status   2019 72 70 - 130 % Final     Comment:     Therapeutic Range:  A Chromogenic Factor 10 level of approximately 20-40%   inversely correlates with an INR of 2-3 for patients receiving Warfarin.   Chromogenic Factor 10 levels below 20% indicate an INR greater than 3 and   levels above 40% indicate an INR less than 2.         ASSESSMENT / PLAN  No question data found.  Anticoagulation Summary  As of 2019    INR goal:   2.0-3.0   TTR:   59.3 % (1.2 mo)   INR used for dosin.2! (2019)   Warfarin maintenance plan:   No maintenance plan   Full warfarin instructions:   : 2.5 mg; : 5 mg; : 7.5 mg   Plan last modified:   Chasity Gonzalez RN (2019)   Next INR check:   2019   Priority:   INR   Target end date:   Indefinite    Indications    Heart failure (H) [I50.9]  LVAD (left ventricular assist device) present (H) [Z95.811]             Anticoagulation Episode Summary     INR check location:       Preferred lab:       Send INR reminders to:   [facility]    Comments:   LVAD implanted  ASA 81mg Daily Has 2.5mg tablets   [place] lab wyy=[phone].  Faxed S.O there on 2019        Anticoagulation Care Providers     Provider Role Specialty Phone number    Jenni Ortiz MD [facility] Cardiology [phone]            See the Encounter Report to view Anticoagulation Flowsheet and Dosing Calendar (Go to Encounters tab in chart review, and find the Anticoagulation Therapy Visit)    Left message for patient with results and dosing recommendations. Asked patient to call back to report any missed doses, falls, signs and symptoms of bleeding or  clotting, any changes in health, medication, or diet. Asked patient to call back with any questions or concerns.     Sophy Griggs RN

## 2019-02-26 ENCOUNTER — PRE VISIT (OUTPATIENT)
Dept: UROLOGY | Facility: CLINIC | Age: 59
End: 2019-02-26

## 2019-02-26 NOTE — TELEPHONE ENCOUNTER
MEDICAL RECORDS REQUEST   [facility]  Urology Clinic  [address] [place] [address]  PHONE: [phone]  Fax: [phone]        FUTURE VISIT INFORMATION                                                   Mariusz Sharp, : 1960 scheduled for future visit at [facility]    APPOINTMENT INFORMATION:    Date: 2019    Provider:  GIOVANNY PAL    Reason for Visit/Diagnosis: BLOOD IN URINE    REFERRAL INFORMATION:    Referring provider:  SEVEN SIERRA    Specialty: MD    Referring providers clinic:  [facility] contact number:  [phone]    RECORDS REQUESTED FOR VISIT                                                     NOTES  STATUS/DETAILS   OFFICE NOTE from referring provider  yes   OFFICE NOTE from other specialist  yes   DISCHARGE SUMMARY from hospital  no   DISCHARGE REPORT from the ER  no   OPERATIVE REPORT  no   MEDICATION LIST  yes   LABS     URINALYSIS (UA)  yes       PRE-VISIT CHECKLIST      Record collection complete Yes   Appointment appropriately scheduled           (right time/right provider) Yes   MyChart activation Yes   Questionnaire complete If no, please explain IN PROCESS     Completed by: Ila Cowan

## 2019-02-28 ENCOUNTER — ANTICOAGULATION THERAPY VISIT (OUTPATIENT)
Dept: ANTICOAGULATION | Facility: CLINIC | Age: 59
End: 2019-02-28

## 2019-02-28 DIAGNOSIS — I50.9 HEART FAILURE (H): ICD-10-CM

## 2019-02-28 DIAGNOSIS — Z95.811 LVAD (LEFT VENTRICULAR ASSIST DEVICE) PRESENT (H): ICD-10-CM

## 2019-02-28 LAB — INR PPP: 2.4

## 2019-02-28 NOTE — PROGRESS NOTES
ANTICOAGULATION FOLLOW-UP CLINIC VISIT    Patient Name:  Mariusz Sharp  Date:  2019  Contact Type:  Telephone    SUBJECTIVE:     Patient Findings     Positives:   No Problem Findings           OBJECTIVE    INR   Date Value Ref Range Status   2019 2.4  Final     Comment:     Outside lab     Chromogenic Factor 10   Date Value Ref Range Status   2019 72 70 - 130 % Final     Comment:     Therapeutic Range:  A Chromogenic Factor 10 level of approximately 20-40%   inversely correlates with an INR of 2-3 for patients receiving Warfarin.   Chromogenic Factor 10 levels below 20% indicate an INR greater than 3 and   levels above 40% indicate an INR less than 2.         ASSESSMENT / PLAN  INR assessment THER    Recheck INR In: 4 DAYS    INR Location Outside lab      Anticoagulation Summary  As of 2019    INR goal:   2.0-3.0   TTR:   57.2 % (1.3 mo)   INR used for dosin.4 (2019)   Warfarin maintenance plan:   No maintenance plan   Full warfarin instructions:   : 5 mg; 3: 7.5 mg; 3: 5 mg; 33: 5 mg   Plan last modified:   Chasity Gonzalez RN (2019)   Next INR check:   3/4/2019   Priority:   INR   Target end date:   Indefinite    Indications    Heart failure (H) [I50.9]  LVAD (left ventricular assist device) present (H) [Z95.811]             Anticoagulation Episode Summary     INR check location:       Preferred lab:       Send INR reminders to:   [facility]    Comments:   LVAD implanted  ASA 81mg Daily Has 2.5mg tablets   [place] lab ycb=[phone].  Faxed S.O there on 2019        Anticoagulation Care Providers     Provider Role Specialty Phone number    Jenni Ortiz MD Responsible Cardiology [phone]            See the Encounter Report to view Anticoagulation Flowsheet and Dosing Calendar (Go to Encounters tab in chart review, and find the Anticoagulation Therapy Visit)    Spoke with patient. Gave them their lab results and new warfarin  recommendation.  No changes in health, medication, or diet. No missed doses, no falls. No signs or symptoms of bleed or clotting.    Patient had LVAD placed on:   12/31/18  Patient's current Aspirin dose: 81mg  LVAD Protocol followed:  Yes.   If Not Followed Explanation:  Sandor Zaman RN

## 2019-03-04 ENCOUNTER — ANTICOAGULATION THERAPY VISIT (OUTPATIENT)
Dept: ANTICOAGULATION | Facility: CLINIC | Age: 59
End: 2019-03-04

## 2019-03-04 DIAGNOSIS — Z95.811 LVAD (LEFT VENTRICULAR ASSIST DEVICE) PRESENT (H): ICD-10-CM

## 2019-03-04 DIAGNOSIS — I50.9 HEART FAILURE (H): ICD-10-CM

## 2019-03-04 LAB — INR PPP: 3.4

## 2019-03-04 NOTE — PROGRESS NOTES
ANTICOAGULATION FOLLOW-UP CLINIC VISIT    Patient Name:  Mariusz Sharp  Date:  3/4/2019  Contact Type:  Telephone    SUBJECTIVE:     Patient Findings     Positives:   No Problem Findings           OBJECTIVE    INR   Date Value Ref Range Status   03/04/2019 3.4  Final     Chromogenic Factor 10   Date Value Ref Range Status   01/11/2019 72 70 - 130 % Final     Comment:     Therapeutic Range:  A Chromogenic Factor 10 level of approximately 20-40%   inversely correlates with an INR of 2-3 for patients receiving Warfarin.   Chromogenic Factor 10 levels below 20% indicate an INR greater than 3 and   levels above 40% indicate an INR less than 2.         ASSESSMENT / PLAN  INR assessment SUPRA    Recheck INR In: 4 DAYS    INR Location Outside lab      Anticoagulation Summary  As of 3/4/2019    INR goal:   2.0-3.0   TTR:   57.5 % (1.4 mo)   INR used for dosing:   3.4! (3/4/2019)   Warfarin maintenance plan:   No maintenance plan   Full warfarin instructions:   3/4: 5 mg; 3/5: 5 mg; 3/6: 5 mg; 3/7: 5 mg   Plan last modified:   Chasity Gonzalez RN (1/17/2019)   Next INR check:   3/8/2019   Priority:   INR   Target end date:   Indefinite    Indications    Heart failure (H) [I50.9]  LVAD (left ventricular assist device) present (H) [Z95.811]             Anticoagulation Episode Summary     INR check location:       Preferred lab:       Send INR reminders to:   [facility]    Comments:   LVAD implanted 12/31 ASA 81mg Daily Has 2.5mg tablets   [place] lab ydn=[phone].  Faxed S.O there on 2/25/2019        Anticoagulation Care Providers     Provider Role Specialty Phone number    Jenni Ortiz MD Responsible Cardiology [phone]            See the Encounter Report to view Anticoagulation Flowsheet and Dosing Calendar (Go to Encounters tab in chart review, and find the Anticoagulation Therapy Visit)    Spoke with patient. Gave them their lab results and new warfarin recommendation.  No changes in health,  medication, or diet. No missed doses, no falls. No signs or symptoms of bleed or clotting.      Brenda Alford RN

## 2019-03-04 NOTE — PROGRESS NOTES
Service Date: 02/11/2019      REASON FOR VISIT:  I was requested to see Mr. Sharp for followup after evaluation of HeartMate III LVAD placement.        HISTORY OF PRESENT ILLNESS:  The patient is a 58-year-old gentleman with a past medical history of coronary artery disease, status post HeartMate III placement on 12/31/2018.  His postoperative course was complicated by mild erectile dysfunction that improved with inotropes.  He was discharged to TCU.  Over the last several days he has had multiple low simona lumps that have recently gotten better.  His effort tolerance is improved.  He denies any fever, chills, syncope, palpitation, or loss of consciousness.  He does complain of some lightheadedness with exertion.      PAST MEDICAL HISTORY:  Reviewed.       FAMILY HISTORY:  Reviewed.      SOCIAL HISTORY:  Denies alcohol, drug or tobacco use.      MEDICATION LIST:  Reviewed.      REVIEW OF SYSTEMS:     CONSTITUTIONAL:  He denies fatigue, fever or chills.   RESPIRATORY:  None.   CARDIOVASCULAR:  HeartMate III LVAD.   GENITOURINARY:  None.   GASTROINTESTINAL:  None.   ENDOCRINE:  None.   MUSCULOSKELETAL:  None.   NEUROLOGIC:  None.   PSYCHIATRIC:  None.   IMMUNOLOGIC:  None.   EYES:  None.      PHYSICAL EXAMINATION:     VITAL SIGNS:  He is afebrile.  Vitals are stable.     CARDIOVASCULAR:  Cardiac parameters currently appear stable.  Sternal wound has healed well with no evidence of infection or instability.   RESPIRATORY:  Bilateral breath sounds.  No rales, rhonchi or crepitations.   ABDOMEN:  Bowel sounds present, nontender.   LOWER EXTREMITIES:  Warm.  No pedal edema.   NEUROLOGICAL:  No focal deficits.        LABORATORY:  White cell count within normal limits.  INR 2.5.  Electrolytes within normal limits.      ASSESSMENT AND PLAN:  A 58-year-old gentleman status post HeartMate III LVAD placement for ischemic cardiomyopathy.  He is currently doing well.  Of concern are multiple low-flow alarms that he has had that  might have been related to aggressive diuretic and hydralazine use.  They are currently better.  If they persist, we may need to repeat a right heart cath to evaluate his hemodynamic status.        If there are any questions regarding his care, please contact me.         CONSTANTIN CARTER MD             D: 2019   T: 2019   MT: ally      Name:     ADILSON RINCON   MRN:      [id number]        Account:      [id number]   :      1960           Service Date: 2019      Document: [id number]       cc: [facility] Billing

## 2019-03-05 ENCOUNTER — CARE COORDINATION (OUTPATIENT)
Dept: CARDIOLOGY | Facility: CLINIC | Age: 59
End: 2019-03-05

## 2019-03-05 NOTE — PROGRESS NOTES
Called patient/caregiver to check in 6 weeks post discharge. Pt reports VAD parameters mostly WNL, still has low flow alarms of varying frequency - pt is always asymptomatic with alarms and weight stable. Reviewed medications and answered any questions. Patient reports sleeping poorly - however this is his baseline and no anxiety since being home with LVAD. Patient is able to move around the house and care for himself independently.     Discussed specific new problems/stressors since being discharged from the hospital: occasionally has dizzy spells where he has to sit down and wait for them to pass. Low flow alarms occur at random, and pt endorses that he is eating and drinking appropriately and taking his meds at the same time everyday. Alarms and dizziness are inconsistent, and do not impact daily activities. Empathized with patient and reviewed coping strategies: enlisting support from friends and love ones, attending patient and caregiver support groups, reviewing LVAD educational materials to reinforce knowledge, and talking about concerns with family/care providers/trusted others. Encouraged pt to page VAD Coordinator with any issues or questions. Pt verbalizes understanding.

## 2019-03-08 ENCOUNTER — ANTICOAGULATION THERAPY VISIT (OUTPATIENT)
Dept: ANTICOAGULATION | Facility: CLINIC | Age: 59
End: 2019-03-08

## 2019-03-08 DIAGNOSIS — Z95.811 LVAD (LEFT VENTRICULAR ASSIST DEVICE) PRESENT (H): ICD-10-CM

## 2019-03-08 DIAGNOSIS — I50.9 HEART FAILURE (H): ICD-10-CM

## 2019-03-08 LAB — INR PPP: 2.8

## 2019-03-08 NOTE — PROGRESS NOTES
ANTICOAGULATION FOLLOW-UP CLINIC VISIT    Patient Name:  Mariusz Sharp  Date:  3/8/2019  Contact Type:  Telephone    SUBJECTIVE:     Patient Findings     Positives:   No Problem Findings           OBJECTIVE    INR   Date Value Ref Range Status   2019 2.8  Corrected     Chromogenic Factor 10   Date Value Ref Range Status   2019 72 70 - 130 % Final     Comment:     Therapeutic Range:  A Chromogenic Factor 10 level of approximately 20-40%   inversely correlates with an INR of 2-3 for patients receiving Warfarin.   Chromogenic Factor 10 levels below 20% indicate an INR greater than 3 and   levels above 40% indicate an INR less than 2.         ASSESSMENT / PLAN  No question data found.  Anticoagulation Summary  As of 3/8/2019    INR goal:   2.0-3.0   TTR:   55.4 % (1.5 mo)   INR used for dosin.8 (3/8/2019)   Warfarin maintenance plan:   No maintenance plan   Full warfarin instructions:   3/8: 7.5 mg; 3/9: 5 mg; 3/10: 5 mg; 3/11: 5 mg; 3/12: 5 mg; 3/13: 5 mg; 3/14: 5 mg   Plan last modified:   Chasity Gonzalez RN (2019)   Next INR check:   3/15/2019   Priority:   INR   Target end date:   Indefinite    Indications    Heart failure (H) [I50.9]  LVAD (left ventricular assist device) present (H) [Z95.811]             Anticoagulation Episode Summary     INR check location:       Preferred lab:       Send INR reminders to:    [facility]    Comments:   LVAD implanted  ASA 81mg Daily Has 2.5mg tablets   [place] lab sgo=[phone].  Faxed S.O there on 2019        Anticoagulation Care Providers     Provider Role Specialty Phone number    Jenni Ortiz MD Responsible Cardiology [phone]            See the Encounter Report to view Anticoagulation Flowsheet and Dosing Calendar (Go to Encounters tab in chart review, and find the Anticoagulation Therapy Visit)    Spoke with patient.     Sophy Griggs RN

## 2019-03-14 ENCOUNTER — CARE COORDINATION (OUTPATIENT)
Dept: CARDIOLOGY | Facility: CLINIC | Age: 59
End: 2019-03-14

## 2019-03-14 NOTE — PROGRESS NOTES
Called patient/caregiver to check in 8 weeks post discharge. Pt reports VAD parameters WNL (still has some low flow alarms, mostly in the am's prior to meds and water intake) and weight stable. Reviewed medications and answered any questions. Patient reports sleeping fair and no anxiety since being home with LVAD. Patient is able to move around the house and care for himself independently.     Discussed specific new problems/stressors since being discharged from the hospital: no concerns. Started cardiac rehab this week. Empathized with patient and reviewed coping strategies: enlisting support from friends and love ones, attending patient and caregiver support groups, reviewing LVAD educational materials to reinforce knowledge, and talking about concerns with family/care providers/trusted others. Encouraged pt to page VAD Coordinator with any issues or questions. Pt verbalizes understanding.

## 2019-03-15 ENCOUNTER — ANTICOAGULATION THERAPY VISIT (OUTPATIENT)
Dept: ANTICOAGULATION | Facility: CLINIC | Age: 59
End: 2019-03-15

## 2019-03-15 DIAGNOSIS — I50.9 HEART FAILURE (H): ICD-10-CM

## 2019-03-15 DIAGNOSIS — Z95.811 LVAD (LEFT VENTRICULAR ASSIST DEVICE) PRESENT (H): ICD-10-CM

## 2019-03-15 LAB — INR PPP: 2.1

## 2019-03-15 NOTE — PROGRESS NOTES
ANTICOAGULATION FOLLOW-UP CLINIC VISIT    Patient Name:  Mariusz Sharp  Date:  3/15/2019  Contact Type:  Telephone    SUBJECTIVE:     Patient Findings            OBJECTIVE    INR   Date Value Ref Range Status   03/15/2019 2.10  Final     Comment:     Outside Lab      Chromogenic Factor 10   Date Value Ref Range Status   2019 72 70 - 130 % Final     Comment:     Therapeutic Range:  A Chromogenic Factor 10 level of approximately 20-40%   inversely correlates with an INR of 2-3 for patients receiving Warfarin.   Chromogenic Factor 10 levels below 20% indicate an INR greater than 3 and   levels above 40% indicate an INR less than 2.         ASSESSMENT / PLAN  INR assessment THER    Recheck INR In: 1 WEEK    INR Location Outside lab      Anticoagulation Summary  As of 3/15/2019    INR goal:   2.0-3.0   TTR:   61.3 % (1.8 mo)   INR used for dosin.10 (3/15/2019)   Warfarin maintenance plan:   No maintenance plan   Full warfarin instructions:   3/15: 7.5 mg; 3/16: 5 mg; 3/17: 5 mg; 3/18: 5 mg; 3/19: 5 mg; 320: 5 mg; 3: 5 mg   Plan last modified:   Chasity Gonzalez RN (2019)   Next INR check:   3/22/2019   Priority:   INR   Target end date:   Indefinite    Indications    Heart failure (H) [I50.9]  LVAD (left ventricular assist device) present (H) [Z95.811]             Anticoagulation Episode Summary     INR check location:       Preferred lab:       Send INR reminders to:    [facility]    Comments:   LVAD implanted  ASA 81mg Daily Has 2.5mg tablets   [place] lab yqu=[phone].  Faxed S.O there on 2019        Anticoagulation Care Providers     Provider Role Specialty Phone number    Jenni Ortiz MD [facility] Cardiology [phone]            See the Encounter Report to view Anticoagulation Flowsheet and Dosing Calendar (Go to Encounters tab in chart review, and find the Anticoagulation Therapy Visit)    Spoke with patient. Gave them their lab results and new warfarin  recommendation.  No changes in health, medication, or diet. No missed doses, no falls. No signs or symptoms of bleed or clotting.     If pt continues to be within goal range with dose of 7.5mg F and 5mg ROW we can incorporate this as a maintenance dose.    Chasity Gonzalez RN

## 2019-03-18 ENCOUNTER — PRE VISIT (OUTPATIENT)
Dept: UROLOGY | Facility: CLINIC | Age: 59
End: 2019-03-18

## 2019-03-18 RX ORDER — WARFARIN SODIUM 2.5 MG/1
TABLET ORAL
Qty: 150 TABLET | Refills: 5 | Status: SHIPPED | OUTPATIENT
Start: 2019-03-18 | End: 2020-03-24

## 2019-03-18 NOTE — PROGRESS NOTES
Addendum 3/18/19 Refill for Warfarin 2.5mg tablets sent to [facility] in [place] to reflect new dose. Chasity Gonzalez RN

## 2019-03-18 NOTE — TELEPHONE ENCOUNTER
Chief Complaint : New-Referred by Dr. KIKA Ortiz    New Hx/Sx: Hematuria    Records/Orders/Proced: Available    Pt Contacted: Not Needed    At Rooming: Urine

## 2019-03-22 ENCOUNTER — ANTICOAGULATION THERAPY VISIT (OUTPATIENT)
Dept: ANTICOAGULATION | Facility: CLINIC | Age: 59
End: 2019-03-22

## 2019-03-22 DIAGNOSIS — Z95.811 LVAD (LEFT VENTRICULAR ASSIST DEVICE) PRESENT (H): ICD-10-CM

## 2019-03-22 DIAGNOSIS — I50.9 HEART FAILURE (H): ICD-10-CM

## 2019-03-22 LAB — INR PPP: 2.1

## 2019-03-22 NOTE — PROGRESS NOTES
ANTICOAGULATION FOLLOW-UP CLINIC VISIT    Patient Name:  Mariusz Sharp  Date:  3/22/2019  Contact Type:  Telephone    SUBJECTIVE:        OBJECTIVE    INR   Date Value Ref Range Status   2019 2.1  Final     Chromogenic Factor 10   Date Value Ref Range Status   2019 72 70 - 130 % Final     Comment:     Therapeutic Range:  A Chromogenic Factor 10 level of approximately 20-40%   inversely correlates with an INR of 2-3 for patients receiving Warfarin.   Chromogenic Factor 10 levels below 20% indicate an INR greater than 3 and   levels above 40% indicate an INR less than 2.         ASSESSMENT / PLAN  No question data found.  Anticoagulation Summary  As of 3/22/2019    INR goal:   2.0-3.0   TTR:   65.8 % (2 mo)   INR used for dosin.1 (3/22/2019)   Warfarin maintenance plan:   No maintenance plan   Full warfarin instructions:   3/22: 7.5 mg; 3/23: 5 mg; 3/24: 5 mg; 3/25: 7.5 mg; 3/26: 5 mg; 3/27: 5 mg; 3/28: 5 mg   Plan last modified:   Chasity Gonzalez RN (2019)   Next INR check:   3/29/2019   Priority:   INR   Target end date:   Indefinite    Indications    Heart failure (H) [I50.9]  LVAD (left ventricular assist device) present (H) [Z95.811]             Anticoagulation Episode Summary     INR check location:       Preferred lab:       Send INR reminders to:   [facility]    Comments:   LVAD implanted  ASA 81mg Daily Has 2.5mg tablets   [place] lab jar=[phone].  Faxed S.O there on 2019        Anticoagulation Care Providers     Provider Role Specialty Phone number    Jenni Ortiz MD Responsible Cardiology [phone]            See the Encounter Report to view Anticoagulation Flowsheet and Dosing Calendar (Go to Encounters tab in chart review, and find the Anticoagulation Therapy Visit)    Spoke with patient.     Sophy Griggs RN

## 2019-03-26 DIAGNOSIS — I50.22 CHRONIC SYSTOLIC CONGESTIVE HEART FAILURE (H): ICD-10-CM

## 2019-03-26 DIAGNOSIS — Z95.811 LVAD (LEFT VENTRICULAR ASSIST DEVICE) PRESENT (H): Primary | ICD-10-CM

## 2019-03-27 ENCOUNTER — ANTICOAGULATION THERAPY VISIT (OUTPATIENT)
Dept: ANTICOAGULATION | Facility: CLINIC | Age: 59
End: 2019-03-27

## 2019-03-27 ENCOUNTER — ANCILLARY PROCEDURE (OUTPATIENT)
Dept: CARDIOLOGY | Facility: CLINIC | Age: 59
End: 2019-03-27
Attending: NURSE PRACTITIONER
Payer: COMMERCIAL

## 2019-03-27 ENCOUNTER — OFFICE VISIT (OUTPATIENT)
Dept: UROLOGY | Facility: CLINIC | Age: 59
End: 2019-03-27
Attending: INTERNAL MEDICINE
Payer: COMMERCIAL

## 2019-03-27 ENCOUNTER — OFFICE VISIT (OUTPATIENT)
Dept: CARDIOLOGY | Facility: CLINIC | Age: 59
End: 2019-03-27
Attending: INTERNAL MEDICINE
Payer: COMMERCIAL

## 2019-03-27 VITALS
HEART RATE: 57 BPM | HEIGHT: 64 IN | BODY MASS INDEX: 32.61 KG/M2 | WEIGHT: 191 LBS | SYSTOLIC BLOOD PRESSURE: 113 MMHG | DIASTOLIC BLOOD PRESSURE: 80 MMHG

## 2019-03-27 VITALS
SYSTOLIC BLOOD PRESSURE: 82 MMHG | BODY MASS INDEX: 33.46 KG/M2 | WEIGHT: 196 LBS | HEART RATE: 60 BPM | TEMPERATURE: 97.5 F | HEIGHT: 64 IN | OXYGEN SATURATION: 96 %

## 2019-03-27 DIAGNOSIS — I50.22 CHRONIC SYSTOLIC CONGESTIVE HEART FAILURE (H): ICD-10-CM

## 2019-03-27 DIAGNOSIS — Z95.811 LVAD (LEFT VENTRICULAR ASSIST DEVICE) PRESENT (H): ICD-10-CM

## 2019-03-27 DIAGNOSIS — R97.20 ELEVATED PROSTATE SPECIFIC ANTIGEN (PSA): ICD-10-CM

## 2019-03-27 DIAGNOSIS — Z95.811 LVAD (LEFT VENTRICULAR ASSIST DEVICE) PRESENT (H): Primary | ICD-10-CM

## 2019-03-27 DIAGNOSIS — R31.0 GROSS HEMATURIA: Primary | ICD-10-CM

## 2019-03-27 DIAGNOSIS — I50.9 HF (HEART FAILURE) (H): ICD-10-CM

## 2019-03-27 DIAGNOSIS — I50.9 HEART FAILURE (H): ICD-10-CM

## 2019-03-27 LAB
ALBUMIN SERPL-MCNC: 3.7 G/DL (ref 3.4–5)
ALBUMIN UR-MCNC: 30 MG/DL
ALP SERPL-CCNC: 58 U/L (ref 40–150)
ALT SERPL W P-5'-P-CCNC: 26 U/L (ref 0–70)
ANION GAP SERPL CALCULATED.3IONS-SCNC: 8 MMOL/L (ref 3–14)
APPEARANCE UR: ABNORMAL
AST SERPL W P-5'-P-CCNC: 24 U/L (ref 0–45)
BILIRUB SERPL-MCNC: 1 MG/DL (ref 0.2–1.3)
BILIRUB UR QL STRIP: NEGATIVE
BUN SERPL-MCNC: 26 MG/DL (ref 7–30)
CALCIUM SERPL-MCNC: 8.5 MG/DL (ref 8.5–10.1)
CHLORIDE SERPL-SCNC: 108 MMOL/L (ref 94–109)
CO2 SERPL-SCNC: 24 MMOL/L (ref 20–32)
COLOR UR AUTO: YELLOW
COPATH REPORT: NORMAL
CREAT SERPL-MCNC: 1.47 MG/DL (ref 0.66–1.25)
ERYTHROCYTE [DISTWIDTH] IN BLOOD BY AUTOMATED COUNT: 14.2 % (ref 10–15)
GFR SERPL CREATININE-BSD FRML MDRD: 52 ML/MIN/{1.73_M2}
GLUCOSE SERPL-MCNC: 183 MG/DL (ref 70–99)
GLUCOSE UR STRIP-MCNC: 50 MG/DL
HCT VFR BLD AUTO: 36.3 % (ref 40–53)
HGB BLD-MCNC: 11.1 G/DL (ref 13.3–17.7)
HGB UR QL STRIP: ABNORMAL
INR PPP: 2.16 (ref 0.86–1.14)
KETONES UR STRIP-MCNC: NEGATIVE MG/DL
LDH SERPL L TO P-CCNC: 138 U/L (ref 85–227)
LEUKOCYTE ESTERASE UR QL STRIP: NEGATIVE
MCH RBC QN AUTO: 27 PG (ref 26.5–33)
MCHC RBC AUTO-ENTMCNC: 30.6 G/DL (ref 31.5–36.5)
MCV RBC AUTO: 88 FL (ref 78–100)
MUCOUS THREADS #/AREA URNS LPF: PRESENT /LPF
NITRATE UR QL: NEGATIVE
PH UR STRIP: 5 PH (ref 5–7)
PLATELET # BLD AUTO: 235 10E9/L (ref 150–450)
POTASSIUM SERPL-SCNC: 3.9 MMOL/L (ref 3.4–5.3)
PROT SERPL-MCNC: 7 G/DL (ref 6.8–8.8)
PSA SERPL-MCNC: 3.83 UG/L (ref 0–4)
RBC # BLD AUTO: 4.11 10E12/L (ref 4.4–5.9)
RBC #/AREA URNS AUTO: >182 /HPF (ref 0–2)
SODIUM SERPL-SCNC: 139 MMOL/L (ref 133–144)
SOURCE: ABNORMAL
SP GR UR STRIP: 1.02 (ref 1–1.03)
SQUAMOUS #/AREA URNS AUTO: <1 /HPF (ref 0–1)
TRANS CELLS #/AREA URNS HPF: <1 /HPF
UROBILINOGEN UR STRIP-MCNC: 0 MG/DL (ref 0–2)
WBC # BLD AUTO: 5.6 10E9/L (ref 4–11)
WBC #/AREA URNS AUTO: 3 /HPF (ref 0–5)

## 2019-03-27 PROCEDURE — 83615 LACTATE (LD) (LDH) ENZYME: CPT | Performed by: NURSE PRACTITIONER

## 2019-03-27 PROCEDURE — 36415 COLL VENOUS BLD VENIPUNCTURE: CPT | Performed by: NURSE PRACTITIONER

## 2019-03-27 PROCEDURE — 93750 INTERROGATION VAD IN PERSON: CPT | Mod: ZF | Performed by: NURSE PRACTITIONER

## 2019-03-27 PROCEDURE — 85027 COMPLETE CBC AUTOMATED: CPT | Performed by: NURSE PRACTITIONER

## 2019-03-27 PROCEDURE — G0463 HOSPITAL OUTPT CLINIC VISIT: HCPCS | Mod: 25,ZF

## 2019-03-27 PROCEDURE — 85610 PROTHROMBIN TIME: CPT | Performed by: NURSE PRACTITIONER

## 2019-03-27 PROCEDURE — 99214 OFFICE O/P EST MOD 30 MIN: CPT | Mod: 25 | Performed by: NURSE PRACTITIONER

## 2019-03-27 PROCEDURE — 80053 COMPREHEN METABOLIC PANEL: CPT | Performed by: NURSE PRACTITIONER

## 2019-03-27 RX ORDER — SPIRONOLACTONE 25 MG/1
12.5 TABLET ORAL DAILY
Qty: 45 TABLET | Refills: 3 | Status: SHIPPED | OUTPATIENT
Start: 2019-03-27 | End: 2019-05-01

## 2019-03-27 ASSESSMENT — MIFFLIN-ST. JEOR
SCORE: 1607.37
SCORE: 1630.05

## 2019-03-27 ASSESSMENT — PAIN SCALES - GENERAL
PAINLEVEL: NO PAIN (0)
PAINLEVEL: NO PAIN (0)

## 2019-03-27 NOTE — PROGRESS NOTES
HPI:   Mr. 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 Hospital course notable for mild RV dysfunction improved with dobutamine. He was discharged to TCU where he stayed for several days before he was discharged to home. He was seen in clinic regularly post discharge with persistent low flow alarms related to hypovolemia and high afterload. Outflow kink was ruled out by CT, afterload was optimized, and he pushed fluids with some improvement in the frequency. He was last seen in clinic a month ago by Dr. Ortiz following a right heart catheterization showing normal filling pressures and an index of 2.5. Imdur was discontinued due to dizziness. He returns for follow up.    Red is doing well. He continues cardiac rehab. He denies shortness of breath, chest pain, lightheadedness, orthopnea, PND, anorexia, ankle edema, or syncope.    He has noted more palpitations and his ICD showed 1 episode of NSVT. LVAD alarm history only goes back 1 day and shows 3 low flow alarms today, no speed changes, and PI elevated just prior to the events. Low flow alarms typically occur in the early morning. In fact, Red reports sleeping through some alarms this morning.     Driveline exit site is without redness, tenderness. No fevers or chills. No focal deficits. No melena. Ongoing hematuria -- he saw urology this am.     PAST MEDICAL HISTORY:  Past Medical History:   Diagnosis Date     Acute kidney injury (H)      CKD (chronic kidney disease)      Coronary artery disease      Diabetes mellitus (H)      HTN (hypertension)      Hyperlipidemia      Ischemic cardiomyopathy      Paroxysmal atrial flutter (H)      Systolic heart failure (H)      Ventricular tachycardia (H)        FAMILY HISTORY:  No family history on  file.    SOCIAL HISTORY:  Social History     Social History     Marital status: Single     Spouse name: N/A     Number of children: N/A     Years of education: N/A     Social History Main Topics     Smoking status: Never Smoker     Smokeless tobacco: Never Used     Alcohol use Not on file     Drug use: Not on file     Sexual activity: Not on file     Other Topics Concern     Not on file     Social History Narrative     CURRENT MEDICATIONS:  Outpatient Medications Prior to Visit   Medication Sig Dispense Refill     amiodarone (PACERONE/CODARONE) 200 MG tablet Take 1 tablet (200 mg) by mouth daily 90 tablet 3     aspirin (ASA) 81 MG chewable tablet Take 1 tablet (81 mg) by mouth daily 90 tablet 3     carvedilol (COREG) 3.125 MG tablet Take 2 tablets (6.25 mg) by mouth 2 times daily (with meals) 60 tablet 11     digoxin (LANOXIN) 125 MCG tablet Take 1 tablet (125 mcg) by mouth daily 90 tablet 3     famotidine (PEPCID) 20 MG tablet Take 1 tablet (20 mg) by mouth 2 times daily 180 tablet 3     hydrALAZINE (APRESOLINE) 50 MG tablet Take 2 tablets (100 mg) by mouth 3 times daily 100 tablet 11     insulin aspart (NOVOLOG PEN) 100 UNIT/ML pen Prandial Dosin units per 10 grams of carbohydrate each Meal or Snack.  Only chart total amount of units given.  Do not give if pre-prandial glucose is less than 60 mg/dL. If given at mealtime, administer within 30 minutes of start of meal.       insulin glargine (LANTUS SOLOSTAR PEN) 100 UNIT/ML pen Inject 20 Units Subcutaneous At Bedtime       lisinopril (PRINIVIL/ZESTRIL) 2.5 MG tablet Take 2 tablets (5 mg) by mouth 2 times daily 120 tablet 11     rosuvastatin (CRESTOR) 20 MG tablet Take 1 tablet (20 mg) by mouth At Bedtime 90 tablet 3     warfarin (COUMADIN) 2.5 MG tablet Take two to three tablets daily or as directed by the Coumadin clinic 150 tablet 5     insulin aspart (NOVOLOG PEN) 100 UNIT/ML pen Inject 1-10 Units Subcutaneous 3 times daily (before meals) Correction Scale -  HIGH INSULIN RESISTANCE DOSING     -164 =1 units.   -189 =2.   -214 =3.   -239 =4.   -264 =5.   -289 =6.   -314 =7.   -339 =8.   -364 =9.  BG greater than or equal to 365 give 10 units  To be given with meal insulin, based on pre-meal BG 9 mL 0     insulin aspart (NOVOLOG PEN) 100 UNIT/ML pen Inject 1-7 Units Subcutaneous At Bedtime HIGH INSULIN RESISTANCE DOSING    Bedtime  For  - 224 give 1 units.   For  - 249 give 2 units.   For  - 274 give 3 units.   For  - 299 give 4 units.   For  - 324 give 5 units.   For  - 349 give 6 units.   For BG greater than or equal to 350 give 7 units. (Patient not taking: Reported on 2/11/2019) 2.1 mL 0     insulin glargine (LANTUS SOLOSTAR PEN) 100 UNIT/ML pen Inject 20 Units Subcutaneous At Bedtime 6 mL 0     nitroGLYcerin (NITROSTAT) 0.4 MG sublingual tablet Place 1 tablet (0.4 mg) under the tongue every 5 minutes as needed for chest pain For chest pain place 1 tablet under the tongue every 5 minutes for 3 doses. If symptoms persist 5 minutes after 1st dose call 911. 10 tablet 0     No facility-administered medications prior to visit.      ROS:   CONSTITUTIONAL: Denies fever, chills, fatigue, or weight fluctuations.   HEENT: Denies headache and changes in speech. He complains of vision changes.   CV: Refer to HPI.   PULMONARY:Denies shortness of breath, cough, or previous TB exposure.   GI:Denies nausea, vomiting, diarrhea, and abdominal pain. Bowel movements are regular.   :+ Hematuria noted in HPI; Denies urinary alterations, dysuria, urinary frequency,and abnormal drainage.   EXT:Denies lower extremity edema.   SKIN:Denies abnormal rashes or lesions.   MUSCULOSKELETAL:Denies upper or lower extremity weakness and pain.   NEUROLOGIC:Denies lightheadedness, dizziness, seizures, or upper or lower extremity paresthesia.     EXAM:  BP (!) 82/0 (BP Location: Right arm, Patient Position: Chair,  "Cuff Size: Adult Regular)   Pulse 60   Temp 97.5  F (36.4  C) (Oral)   Ht 1.626 m (5' 4\")   Wt 88.9 kg (196 lb)   SpO2 96%   BMI 33.64 kg/m    GENERAL: Appears alert and oriented times three.   HEENT: Eye symmetrical and free of discharge bilaterally. Mucous membranes moist and without lesions.  NECK: Supple and without lymphadenopathy.   CV: pulsatile. Mechanical hum of LVAD. No JVD  RESPIRATORY: Respirations regular, even, and unlabored. Lungs CTA throughout.   GI: Soft and non distended with normoactive bowel sounds present in all quadrants. No tenderness, rebound, guarding. No organomegaly.    EXTREMITIES: No peripheral edema.   NEUROLOGIC: Alert and orientated x 3. CN II-XII grossly intact. No focal deficits.   MUSCULOSKELETAL: No joint swelling or tenderness.   SKIN: No jaundice. No rashes or lesions    VAD Interrogation on 3/27/19: VAD interrogation reviewed with VAD coordinator. Agree with findings. Frequent PI events and several low flow alarms. No  power spikes or other findings suspicious of pump malfunction noted.     Labs:   Reviewed CMP, LDH, INR, CBC    Cardiopulmonary Stress test 8/6/18  Walked 7 min 22 seconds. MVO2 12.5.  RER 1.1, VE/VCO2 slope: 48.24. /52 at rest, 105/59 exercise.    Heart catheterization 7/11/18 at OSH       Assessment and Plan:   Red is a pleasant 58 year old man s/p HM3 LVAD who looks and feels well. We'll add spironolactone 12.5 mg today and repeat a BMP in 1 week. He'll return to see Dr. Ortiz in 1 month.      25 minutes spent in direct care, >50% in counseling        CC  ALETHEA VOGT    "

## 2019-03-27 NOTE — NURSING NOTE
Chief Complaint   Patient presents with     Follow Up     3 month Post VAD Implant Follow Up     Vitals were taken and medications were reconciled.    Kristine Garcia CMA    2:04 PM

## 2019-03-27 NOTE — LETTER
RE: Mariusz Sharp  [address] [facility] [address]     Dear Colleague,    Thank you for referring your patient, Mariusz Sharp, to the [facility] PROSTATE AND UROLOGIC CANCERS at [facility]. Please see a copy of my visit note below.    It was my pleasure to see Mariusz Sharp a 58 year old year old male today. Patient was seen in consultation from  hematuria.    HPI:     Patient with history of cardiac failure     Patient notes history of gross hematuria. Noted last evening gross hematuria with activity.  Has had these episodes for several weeks.   Has history of 1cm lower pole calculus on the left.     No significant lower urinary tract symptoms.     Patient currently with LVAD awaiting cardiac transplant.     Of note, PSA in 08/2018 was 4.48.    Past Medical History:   Diagnosis Date     Acute kidney injury (H)      CKD (chronic kidney disease)      Coronary artery disease      Diabetes mellitus (H)      HTN (hypertension)      Hyperlipidemia      Ischemic cardiomyopathy      Paroxysmal atrial flutter (H)      Systolic heart failure (H)      Ventricular tachycardia (H)        Past Surgical History:   Procedure Laterality Date     COLONOSCOPY N/A 12/7/2018    Procedure: COLONOSCOPY;  Surgeon: Krish Mercado MD;  Location:  OR     CV RIGHT HEART CATH N/A 2/19/2019    Procedure: RHC;  Surgeon: Brian Long MD;  Location:  HEART CARDIAC CATH LAB     HC PRQ TRLUML CORONARY ANGIOPLASTY ADDL BRANCH      PCI and ALYSSA to ostial LAD on 6/27/18     IMPLANT AUTOMATIC IMPLANTABLE CARDIOVERTER DEFIBRILLATOR       INSERT VENTRICULAR ASSIST DEVICE LEFT (HEARTMATE II) N/A 12/31/2018    Procedure: Median Sternotomy, On Cardiopulmonary Bypass Pump, Insertion of Left Ventricular Assist Device (Heartmate III);  Surgeon: Kamaljit Baker MD;  Location:  OR       History reviewed. No pertinent family history.    Current Outpatient Medications   Medication Sig Dispense  Refill     amiodarone (PACERONE/CODARONE) 200 MG tablet Take 1 tablet (200 mg) by mouth daily 90 tablet 3     aspirin (ASA) 81 MG chewable tablet Take 1 tablet (81 mg) by mouth daily 90 tablet 3     carvedilol (COREG) 3.125 MG tablet Take 2 tablets (6.25 mg) by mouth 2 times daily (with meals) 60 tablet 11     digoxin (LANOXIN) 125 MCG tablet Take 1 tablet (125 mcg) by mouth daily 90 tablet 3     famotidine (PEPCID) 20 MG tablet Take 1 tablet (20 mg) by mouth 2 times daily 180 tablet 3     hydrALAZINE (APRESOLINE) 50 MG tablet Take 2 tablets (100 mg) by mouth 3 times daily 100 tablet 11     insulin aspart (NOVOLOG PEN) 100 UNIT/ML pen Prandial Dosin units per 10 grams of carbohydrate each Meal or Snack.  Only chart total amount of units given.  Do not give if pre-prandial glucose is less than 60 mg/dL. If given at mealtime, administer within 30 minutes of start of meal.       insulin glargine (LANTUS SOLOSTAR PEN) 100 UNIT/ML pen Inject 20 Units Subcutaneous At Bedtime       lisinopril (PRINIVIL/ZESTRIL) 2.5 MG tablet Take 2 tablets (5 mg) by mouth 2 times daily 120 tablet 11     rosuvastatin (CRESTOR) 20 MG tablet Take 1 tablet (20 mg) by mouth At Bedtime 90 tablet 3     warfarin (COUMADIN) 2.5 MG tablet Take two to three tablets daily or as directed by the Coumadin clinic 150 tablet 5     insulin aspart (NOVOLOG PEN) 100 UNIT/ML pen Inject 1-10 Units Subcutaneous 3 times daily (before meals) Correction Scale - HIGH INSULIN RESISTANCE DOSING     -164 =1 units.   -189 =2.   -214 =3.   -239 =4.   -264 =5.   -289 =6.   -314 =7.   -339 =8.   -364 =9.  BG greater than or equal to 365 give 10 units  To be given with meal insulin, based on pre-meal BG 9 mL 0     insulin aspart (NOVOLOG PEN) 100 UNIT/ML pen Inject 1-7 Units Subcutaneous At Bedtime HIGH INSULIN RESISTANCE DOSING    Bedtime  For  - 224 give 1 units.   For  - 249 give 2 units.   For   "- 274 give 3 units.   For  - 299 give 4 units.   For  - 324 give 5 units.   For  - 349 give 6 units.   For BG greater than or equal to 350 give 7 units. (Patient not taking: Reported on 2/11/2019) 2.1 mL 0     insulin glargine (LANTUS SOLOSTAR PEN) 100 UNIT/ML pen Inject 20 Units Subcutaneous At Bedtime 6 mL 0     nitroGLYcerin (NITROSTAT) 0.4 MG sublingual tablet Place 1 tablet (0.4 mg) under the tongue every 5 minutes as needed for chest pain For chest pain place 1 tablet under the tongue every 5 minutes for 3 doses. If symptoms persist 5 minutes after 1st dose call 911. 10 tablet 0     ALLERGIES: Patient has no known allergies.     GENERAL PHYSICAL EXAM:   Vitals: /80   Pulse 57   Ht 1.626 m (5' 4\")   Wt 86.6 kg (191 lb)   BMI 32.79 kg/m     Body mass index is 32.79 kg/m .  Constitutional: healthy, alert and no distress  Head: Normocephalic  Neck: Neck supple  Cardiovascular: negative  Respiratory: negative  Gastrointestinal: Abdomen soft, non-tender  Musculoskeletal: extremities normal  Skin: no suspicious lesions or rashes  Neurologic: Gait normal  Psychiatric: affect normal/bright and mentation appears normal.     EXAM:   Left Flank: negative  Right Flank: negative  Inguinal Area: normal    RADIOLOGY: The following tests were reviewed: Need to complete the following Radiology exams prior to the office appointment:  LABS: The last test results for Needs to complete the necessary tests prior to appointment: were reviewed.     ASSESSMENT: 59 y/o M with LVAD with several episodes of gross hematuria     PLAN:   Repeat PSA today (last was elevated)   Will send U/a, urine culture, urine cytology today   CT urogram next available   Schedule for cystoscopy next available     I spent over 20 minutes with the patient.  Over half this time was spent on counseling for gross hematuria.     Again, thank you for allowing me to participate in the care of your patient.      Sincerely,    Megan" Inge Ibarra MD

## 2019-03-27 NOTE — PATIENT INSTRUCTIONS
Urine sent for testing today.    Schedule appointment for blood work, CT scan and cystoscopy with Dr. Ibarra.    It was a pleasure meeting with you today.  Thank you for allowing me and my team the privilege of caring for you today.  YOU are the reason we are here, and I truly hope we provided you with the excellent service you deserve.  Please let us know if there is anything else we can do for you so that we can be sure you are leaving completely satisfied with your care experience.        SHIRLEY Rajput

## 2019-03-27 NOTE — PATIENT INSTRUCTIONS
Medications:  1. START taking Spironolactone (Aldactone) 12.5mg daily (this is half of a tab)    Follow-up:  1. 4/24/19 with Dr. Ortiz    Instructions:  1. Continue to hydrate.   2. Get las drawn in 1 week - we will check your kidney function after starting the spironolactone.    Page the VAD Coordinator on call if you gain more than 3 lb in a day or 5 in a week. Please also page if you feel unwell or have alarms.     Great to see you in clinic today. To Page the VAD Coordinator on call, dial [phone] option #4 and ask to speak to the VAD coordinator on call.

## 2019-03-27 NOTE — LETTER
3/27/2019      RE: Mariusz Sharp  [address] [facility] [address]       Dear Colleague,    Thank you for the opportunity to participate in the care of your patient, Mariusz Sharp, at the [facility]. Please see a copy of my visit note below.    HPI:   Mr. 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 Hospital course notable for mild RV dysfunction improved with dobutamine. He was discharged to TCU where he stayed for several days before he was discharged to home. He was seen in clinic regularly post discharge with persistent low flow alarms related to hypovolemia and high afterload. Outflow kink was ruled out by CT, afterload was optimized, and he pushed fluids with some improvement in the frequency. He was last seen in clinic a month ago by Dr. Ortiz following a right heart catheterization showing normal filling pressures and an index of 2.5. Imdur was discontinued due to dizziness. He returns for follow up.    Red is doing well. He continues cardiac rehab. He denies shortness of breath, chest pain, lightheadedness, orthopnea, PND, anorexia, ankle edema, or syncope.    He has noted more palpitations and his ICD showed 1 episode of NSVT. LVAD alarm history only goes back 1 day and shows 3 low flow alarms today, no speed changes, and PI elevated just prior to the events. Low flow alarms typically occur in the early morning. In fact, Red reports sleeping through some alarms this morning.     Driveline exit site is without redness, tenderness. No fevers or chills. No focal deficits. No melena. Ongoing hematuria -- he saw urology this am.     PAST MEDICAL HISTORY:  Past Medical History:   Diagnosis Date     Acute kidney injury (H)       CKD (chronic kidney disease)      Coronary artery disease      Diabetes mellitus (H)      HTN (hypertension)      Hyperlipidemia      Ischemic cardiomyopathy      Paroxysmal atrial flutter (H)      Systolic heart failure (H)      Ventricular tachycardia (H)        FAMILY HISTORY:  No family history on file.    SOCIAL HISTORY:  Social History     Social History     Marital status: Single     Spouse name: N/A     Number of children: N/A     Years of education: N/A     Social History Main Topics     Smoking status: Never Smoker     Smokeless tobacco: Never Used     Alcohol use Not on file     Drug use: Not on file     Sexual activity: Not on file     Other Topics Concern     Not on file     Social History Narrative     CURRENT MEDICATIONS:  Outpatient Medications Prior to Visit   Medication Sig Dispense Refill     amiodarone (PACERONE/CODARONE) 200 MG tablet Take 1 tablet (200 mg) by mouth daily 90 tablet 3     aspirin (ASA) 81 MG chewable tablet Take 1 tablet (81 mg) by mouth daily 90 tablet 3     carvedilol (COREG) 3.125 MG tablet Take 2 tablets (6.25 mg) by mouth 2 times daily (with meals) 60 tablet 11     digoxin (LANOXIN) 125 MCG tablet Take 1 tablet (125 mcg) by mouth daily 90 tablet 3     famotidine (PEPCID) 20 MG tablet Take 1 tablet (20 mg) by mouth 2 times daily 180 tablet 3     hydrALAZINE (APRESOLINE) 50 MG tablet Take 2 tablets (100 mg) by mouth 3 times daily 100 tablet 11     insulin aspart (NOVOLOG PEN) 100 UNIT/ML pen Prandial Dosin units per 10 grams of carbohydrate each Meal or Snack.  Only chart total amount of units given.  Do not give if pre-prandial glucose is less than 60 mg/dL. If given at mealtime, administer within 30 minutes of start of meal.       insulin glargine (LANTUS SOLOSTAR PEN) 100 UNIT/ML pen Inject 20 Units Subcutaneous At Bedtime       lisinopril (PRINIVIL/ZESTRIL) 2.5 MG tablet Take 2 tablets (5 mg) by mouth 2 times daily 120 tablet 11     rosuvastatin (CRESTOR) 20 MG  tablet Take 1 tablet (20 mg) by mouth At Bedtime 90 tablet 3     warfarin (COUMADIN) 2.5 MG tablet Take two to three tablets daily or as directed by the Coumadin clinic 150 tablet 5     insulin aspart (NOVOLOG PEN) 100 UNIT/ML pen Inject 1-10 Units Subcutaneous 3 times daily (before meals) Correction Scale - HIGH INSULIN RESISTANCE DOSING     -164 =1 units.   -189 =2.   -214 =3.   -239 =4.   -264 =5.   -289 =6.   -314 =7.   -339 =8.   -364 =9.  BG greater than or equal to 365 give 10 units  To be given with meal insulin, based on pre-meal BG 9 mL 0     insulin aspart (NOVOLOG PEN) 100 UNIT/ML pen Inject 1-7 Units Subcutaneous At Bedtime HIGH INSULIN RESISTANCE DOSING    Bedtime  For  - 224 give 1 units.   For  - 249 give 2 units.   For  - 274 give 3 units.   For  - 299 give 4 units.   For  - 324 give 5 units.   For  - 349 give 6 units.   For BG greater than or equal to 350 give 7 units. (Patient not taking: Reported on 2/11/2019) 2.1 mL 0     insulin glargine (LANTUS SOLOSTAR PEN) 100 UNIT/ML pen Inject 20 Units Subcutaneous At Bedtime 6 mL 0     nitroGLYcerin (NITROSTAT) 0.4 MG sublingual tablet Place 1 tablet (0.4 mg) under the tongue every 5 minutes as needed for chest pain For chest pain place 1 tablet under the tongue every 5 minutes for 3 doses. If symptoms persist 5 minutes after 1st dose call 911. 10 tablet 0     No facility-administered medications prior to visit.      ROS:   CONSTITUTIONAL: Denies fever, chills, fatigue, or weight fluctuations.   HEENT: Denies headache and changes in speech. He complains of vision changes.   CV: Refer to HPI.   PULMONARY:Denies shortness of breath, cough, or previous TB exposure.   GI:Denies nausea, vomiting, diarrhea, and abdominal pain. Bowel movements are regular.   :+ Hematuria noted in HPI; Denies urinary alterations, dysuria, urinary frequency,and abnormal drainage.  "  EXT:Denies lower extremity edema.   SKIN:Denies abnormal rashes or lesions.   MUSCULOSKELETAL:Denies upper or lower extremity weakness and pain.   NEUROLOGIC:Denies lightheadedness, dizziness, seizures, or upper or lower extremity paresthesia.     EXAM:  BP (!) 82/0 (BP Location: Right arm, Patient Position: Chair, Cuff Size: Adult Regular)   Pulse 60   Temp 97.5  F (36.4  C) (Oral)   Ht 1.626 m (5' 4\")   Wt 88.9 kg (196 lb)   SpO2 96%   BMI 33.64 kg/m     GENERAL: Appears alert and oriented times three.   HEENT: Eye symmetrical and free of discharge bilaterally. Mucous membranes moist and without lesions.  NECK: Supple and without lymphadenopathy.   CV: pulsatile. Mechanical hum of LVAD. No JVD  RESPIRATORY: Respirations regular, even, and unlabored. Lungs CTA throughout.   GI: Soft and non distended with normoactive bowel sounds present in all quadrants. No tenderness, rebound, guarding. No organomegaly.    EXTREMITIES: No peripheral edema.   NEUROLOGIC: Alert and orientated x 3. CN II-XII grossly intact. No focal deficits.   MUSCULOSKELETAL: No joint swelling or tenderness.   SKIN: No jaundice. No rashes or lesions    VAD Interrogation on 3/27/19: VAD interrogation reviewed with VAD coordinator. Agree with findings. Frequent PI events and several low flow alarms. No  power spikes or other findings suspicious of pump malfunction noted.     Labs:   Reviewed CMP, LDH, INR, CBC    Cardiopulmonary Stress test 8/6/18  Walked 7 min 22 seconds. MVO2 12.5.  RER 1.1, VE/VCO2 slope: 48.24. /52 at rest, 105/59 exercise.    Heart catheterization 7/11/18 at OSH       Assessment and Plan:   Red is a pleasant 58 year old man s/p HM3 LVAD who looks and feels well. We'll add spironolactone 12.5 mg today and repeat a BMP in 1 week. He'll return to see Dr. Ortiz in 1 month.      25 minutes spent in direct care, >50% in counseling    Meredith Garcia, DELILAH    CC  ALETHEA VOGT    "

## 2019-03-27 NOTE — PROGRESS NOTES
It was my pleasure to see Mariusz Sharp a 58 year old year old male today. Patient was seen in consultation from  hematuria.    HPI:     Patient with history of cardiac failure     Patient notes history of gross hematuria. Noted last evening gross hematuria with activity.  Has had these episodes for several weeks.   Has history of 1cm lower pole calculus on the left.     No significant lower urinary tract symptoms.     Patient currently with LVAD awaiting cardiac transplant.     Of note, PSA in 08/2018 was 4.48.          Past Medical History:   Diagnosis Date     Acute kidney injury (H)      CKD (chronic kidney disease)      Coronary artery disease      Diabetes mellitus (H)      HTN (hypertension)      Hyperlipidemia      Ischemic cardiomyopathy      Paroxysmal atrial flutter (H)      Systolic heart failure (H)      Ventricular tachycardia (H)        Past Surgical History:   Procedure Laterality Date     COLONOSCOPY N/A 12/7/2018    Procedure: COLONOSCOPY;  Surgeon: Krish Mercado MD;  Location: [facility]     CV RIGHT HEART CATH N/A 2/19/2019    Procedure: RHC;  Surgeon: Brian Long MD;  Location:  HEART CARDIAC CATH LAB     HC PRQ TRLUML CORONARY ANGIOPLASTY ADDL BRANCH      PCI and ALYSSA to ostial LAD on 6/27/18     IMPLANT AUTOMATIC IMPLANTABLE CARDIOVERTER DEFIBRILLATOR       INSERT VENTRICULAR ASSIST DEVICE LEFT (HEARTMATE II) N/A 12/31/2018    Procedure: Median Sternotomy, On Cardiopulmonary Bypass Pump, Insertion of Left Ventricular Assist Device (Heartmate III);  Surgeon: Kamaljit Baker MD;  Location:  OR       History reviewed. No pertinent family history.    Current Outpatient Medications   Medication Sig Dispense Refill     amiodarone (PACERONE/CODARONE) 200 MG tablet Take 1 tablet (200 mg) by mouth daily 90 tablet 3     aspirin (ASA) 81 MG chewable tablet Take 1 tablet (81 mg) by mouth daily 90 tablet 3     carvedilol (COREG) 3.125 MG tablet Take 2 tablets (6.25 mg) by mouth 2 times daily (with  meals) 60 tablet 11     digoxin (LANOXIN) 125 MCG tablet Take 1 tablet (125 mcg) by mouth daily 90 tablet 3     famotidine (PEPCID) 20 MG tablet Take 1 tablet (20 mg) by mouth 2 times daily 180 tablet 3     hydrALAZINE (APRESOLINE) 50 MG tablet Take 2 tablets (100 mg) by mouth 3 times daily 100 tablet 11     insulin aspart (NOVOLOG PEN) 100 UNIT/ML pen Prandial Dosin units per 10 grams of carbohydrate each Meal or Snack.  Only chart total amount of units given.  Do not give if pre-prandial glucose is less than 60 mg/dL. If given at mealtime, administer within 30 minutes of start of meal.       insulin glargine (LANTUS SOLOSTAR PEN) 100 UNIT/ML pen Inject 20 Units Subcutaneous At Bedtime       lisinopril (PRINIVIL/ZESTRIL) 2.5 MG tablet Take 2 tablets (5 mg) by mouth 2 times daily 120 tablet 11     rosuvastatin (CRESTOR) 20 MG tablet Take 1 tablet (20 mg) by mouth At Bedtime 90 tablet 3     warfarin (COUMADIN) 2.5 MG tablet Take two to three tablets daily or as directed by the Coumadin clinic 150 tablet 5     insulin aspart (NOVOLOG PEN) 100 UNIT/ML pen Inject 1-10 Units Subcutaneous 3 times daily (before meals) Correction Scale - HIGH INSULIN RESISTANCE DOSING     -164 =1 units.   -189 =2.   -214 =3.   -239 =4.   -264 =5.   -289 =6.   -314 =7.   -339 =8.   -364 =9.  BG greater than or equal to 365 give 10 units  To be given with meal insulin, based on pre-meal BG 9 mL 0     insulin aspart (NOVOLOG PEN) 100 UNIT/ML pen Inject 1-7 Units Subcutaneous At Bedtime HIGH INSULIN RESISTANCE DOSING    Bedtime  For  - 224 give 1 units.   For  - 249 give 2 units.   For  - 274 give 3 units.   For  - 299 give 4 units.   For  - 324 give 5 units.   For  - 349 give 6 units.   For BG greater than or equal to 350 give 7 units. (Patient not taking: Reported on 2019) 2.1 mL 0     insulin glargine (LANTUS SOLOSTAR PEN) 100 UNIT/ML pen  "Inject 20 Units Subcutaneous At Bedtime 6 mL 0     nitroGLYcerin (NITROSTAT) 0.4 MG sublingual tablet Place 1 tablet (0.4 mg) under the tongue every 5 minutes as needed for chest pain For chest pain place 1 tablet under the tongue every 5 minutes for 3 doses. If symptoms persist 5 minutes after 1st dose call 911. 10 tablet 0       ALLERGIES: Patient has no known allergies.      REVIEW OF SYSTEMS:  Skin: negative  Eyes: negative  Ears/Nose/Throat: negative  Respiratory: No shortness of breath, dyspnea on exertion, cough, or hemoptysis  Cardiovascular: negative  Gastrointestinal: negative  Genitourinary: as above  Musculoskeletal: negative  Neurologic: negative  Psychiatric: negative  Hematologic/Lymphatic/Immunologic: negative  Endocrine: negative      GENERAL PHYSICAL EXAM:   Vitals: /80   Pulse 57   Ht 1.626 m (5' 4\")   Wt 86.6 kg (191 lb)   BMI 32.79 kg/m    Body mass index is 32.79 kg/m .  Constitutional: healthy, alert and no distress  Head: Normocephalic  Neck: Neck supple  Cardiovascular: negative  Respiratory: negative  Gastrointestinal: Abdomen soft, non-tender  Musculoskeletal: extremities normal  Skin: no suspicious lesions or rashes  Neurologic: Gait normal  Psychiatric: affect normal/bright and mentation appears normal.       EXAM:   Left Flank: negative  Right Flank: negative  Inguinal Area: normal        RADIOLOGY: The following tests were reviewed: Need to complete the following Radiology exams prior to the office appointment:  LABS: The last test results for Needs to complete the necessary tests prior to appointment: were reviewed.     ASSESSMENT: 57 y/o M with LVAD with several episodes of gross hematuria     PLAN:   Repeat PSA today (last was elevated)   Will send U/a, urine culture, urine cytology today   CT urogram next available   Schedule for cystoscopy next available             I spent over 20 minutes with the patient.  Over half this time was spent on counseling for gross " hematuria.

## 2019-03-27 NOTE — LETTER
Patient Name: Mariusz Sharp     : 1960     Diagnosis/ICD-10: Heart Failure, unspecified I50.9; LVAD 295.811   Requesting Physician: Dr. Jenni Ortiz   Date of Request: 19     Date to Draw 4/3/19     **Please fax results to Marcy Shahid VAD Coord at [phone].                Call [phone] with Questions      ORDER CODE TESTS YOHANA.VOL.   X CBASIC Na, K, Cl, CO2, Crea, BUN, Glu, Ca GG 0.5-1    BHEPAT Alb, AlkP, ALT, AST, BBil, TP GG 0.5-1    BLIP Chol, Trig, HDL, LDL GG 1-2    CCOMP Na, K, Cl, CO2, Crea, BUN, Glu, Ca, Alb, AlkP, ALT, AST, Bbil, TP,  GG 0.6-1    HGP CBC & Platelet P 0.3-1    HGDP CBC, Differential & Platelet P 0.3-1    PLT Platelet  P 0.3-1    INR INR B 2.7    PTT PTT B 2.7    LD Lactate Dehydrogenase GG 0.3-1    PHGB Plasma Hemoglobin GG 2-3    Pre-Albumin Pre-Albumin RG 1.0                  Signed,      Jenni Ortiz MD  Heart Failure, Mechanical Circulatory Support and Transplant Cardiology   of Medicine,  Division of Cardiology, [facility]

## 2019-03-27 NOTE — NURSING NOTE
Chief Complaint   Patient presents with     Consult     New patient consult for hematuria     Jeannette Lema, A

## 2019-03-27 NOTE — PROGRESS NOTES
ANTICOAGULATION FOLLOW-UP CLINIC VISIT    Patient Name:  Mariusz Sharp  Date:  3/27/2019  Contact Type:  Telephone    SUBJECTIVE:        OBJECTIVE    INR   Date Value Ref Range Status   2019 2.16 (H) 0.86 - 1.14 Final     Chromogenic Factor 10   Date Value Ref Range Status   2019 72 70 - 130 % Final     Comment:     Therapeutic Range:  A Chromogenic Factor 10 level of approximately 20-40%   inversely correlates with an INR of 2-3 for patients receiving Warfarin.   Chromogenic Factor 10 levels below 20% indicate an INR greater than 3 and   levels above 40% indicate an INR less than 2.         ASSESSMENT / PLAN  No question data found.  Anticoagulation Summary  As of 3/27/2019    INR goal:   2.0-3.0   TTR:   68.6 % (2.2 mo)   INR used for dosin.16 (3/27/2019)   Warfarin maintenance plan:   7.5 mg (5 mg x 1.5) every Mon, Fri; 5 mg (5 mg x 1) all other days   Full warfarin instructions:   3/27: 5 mg; 3/28: 5 mg; Otherwise 7.5 mg every Mon, Fri; 5 mg all other days   Weekly warfarin total:   40 mg   Plan last modified:   Sophy Griggs RN (3/27/2019)   Next INR check:   4/3/2019   Priority:   INR   Target end date:   Indefinite    Indications    Heart failure (H) [I50.9]  LVAD (left ventricular assist device) present (H) [Z95.811]             Anticoagulation Episode Summary     INR check location:       Preferred lab:       Send INR reminders to:   [facility]    Comments:   LVAD implanted  ASA 81mg Daily Has 2.5mg tablets   [place] [facility] ghr=[phone].  Faxed S.O there on 2019        Anticoagulation Care Providers     Provider Role Specialty Phone number    Jenni Ortiz MD Responsible Cardiology [phone]            See the Encounter Report to view Anticoagulation Flowsheet and Dosing Calendar (Go to Encounters tab in chart review, and find the Anticoagulation Therapy Visit)    Spoke with patient.     Sophy Griggs RN

## 2019-03-27 NOTE — PATIENT INSTRUCTIONS
It was a pleasure to see you in clinic today. Please do not hesitate to call with any questions or concerns. We look forward to seeing you in clinic at your next device check in 3 months    Divina Bryant, RN  Electrophysiology Nurse Clinician  [facility]  During business hours call:  [phone]  After business hours please call: [phone]- select option #4 and ask for job code [id number].

## 2019-03-27 NOTE — NURSING NOTE
1). PUMP DATA  Primary controller serial number: [id number]    HM 3:   Flow: 3.5 L/min,    Speed: 5300 RPMs,     PI: 7.1 ,  Power: 3.9 Parker, Hct: 36.3 (set artifically low at 20 per instruction of Dr. Ortiz to desensitize Low Flow alarm     Primary controller   Back up battery: Patient use: 12, Replace in: 25  Months     Data downloaded: No   Equipment and driveline assessed for damage: Yes     Back up : Serial number: [id number]  Back up battery: Patient use: 7 Replace in: 29  Months  Programmed settings identical to the settings on the primary controller :Yes      Education complete: Yes   Charge the BACKUP controller s backup battery every 6 months  Perform a self test on BACKUP every 6 months  Change the MPU s batteries every 6 months:Yes    2). ALARMS  Alarms reported by patient since last pump evaluation: Yes - frequent low flow alarms, mostly occurring overnight and in the morning prior to meds and eating or in the evening prior to meds and eating  Alarms or other finding noted during pump data history and alarm download: Yes - several PI events, with history only back to this am. PI's ranging 2-12 (PI surrounding low flows are 9-12)    Action Taken:  Reviewed data with patient: Yes      3). DRESSING CHANGE / DRIVELINE ASSESSMENT  Dressing change completed today: Yes  Appearance of Driveline site: scant serous drainage. Skin is not sealed and driveline hub is pushing on skin at exit site (hub is still internal). Slough visualized inside exit site. Instructed pt on loosening anchor so line is not pulled tight from exit site to anchor. Pt will continue daily dressing changes until skin is sealed around site.     Driveline stabilization: Method: [employer]  [ Teaching reinforced on need for stabilization of Driveline. ]

## 2019-03-28 LAB
BACTERIA SPEC CULT: NORMAL
Lab: NORMAL
MDC_IDC_LEAD_IMPLANT_DT: NORMAL
MDC_IDC_LEAD_IMPLANT_DT: NORMAL
MDC_IDC_LEAD_LOCATION: NORMAL
MDC_IDC_LEAD_LOCATION: NORMAL
MDC_IDC_LEAD_LOCATION_DETAIL_1: NORMAL
MDC_IDC_LEAD_LOCATION_DETAIL_1: NORMAL
MDC_IDC_LEAD_MFG: NORMAL
MDC_IDC_LEAD_MFG: NORMAL
MDC_IDC_LEAD_MODEL: NORMAL
MDC_IDC_LEAD_MODEL: NORMAL
MDC_IDC_LEAD_POLARITY_TYPE: NORMAL
MDC_IDC_LEAD_POLARITY_TYPE: NORMAL
MDC_IDC_LEAD_SERIAL: NORMAL
MDC_IDC_LEAD_SERIAL: NORMAL
MDC_IDC_LEAD_SPECIAL_FUNCTION: NORMAL
MDC_IDC_LEAD_SPECIAL_FUNCTION: NORMAL
MDC_IDC_MSMT_BATTERY_DTM: NORMAL
MDC_IDC_MSMT_BATTERY_REMAINING_LONGEVITY: 144 MO
MDC_IDC_MSMT_BATTERY_STATUS: NORMAL
MDC_IDC_MSMT_CAP_CHARGE_DTM: NORMAL
MDC_IDC_MSMT_CAP_CHARGE_ENERGY: 41 J
MDC_IDC_MSMT_CAP_CHARGE_TIME: 8.39
MDC_IDC_MSMT_CAP_CHARGE_TIME: 9.28
MDC_IDC_MSMT_CAP_CHARGE_TYPE: NORMAL
MDC_IDC_MSMT_CAP_CHARGE_TYPE: NORMAL
MDC_IDC_MSMT_LEADCHNL_RV_IMPEDANCE_VALUE: 941 OHM
MDC_IDC_MSMT_LEADCHNL_RV_PACING_THRESHOLD_AMPLITUDE: 1.1 V
MDC_IDC_MSMT_LEADCHNL_RV_PACING_THRESHOLD_AMPLITUDE: 2 V
MDC_IDC_MSMT_LEADCHNL_RV_PACING_THRESHOLD_PULSEWIDTH: 0.4 MS
MDC_IDC_MSMT_LEADCHNL_RV_PACING_THRESHOLD_PULSEWIDTH: 1 MS
MDC_IDC_MSMT_LEADCHNL_RV_SENSING_INTR_AMPL: 6 MV
MDC_IDC_PG_IMPLANT_DTM: NORMAL
MDC_IDC_PG_MFG: NORMAL
MDC_IDC_PG_MODEL: NORMAL
MDC_IDC_PG_SERIAL: NORMAL
MDC_IDC_PG_TYPE: NORMAL
MDC_IDC_SESS_CLINIC_NAME: NORMAL
MDC_IDC_SESS_DTM: NORMAL
MDC_IDC_SESS_TYPE: NORMAL
MDC_IDC_SET_BRADY_HYSTRATE: NORMAL
MDC_IDC_SET_BRADY_LOWRATE: 40 {BEATS}/MIN
MDC_IDC_SET_BRADY_MODE: NORMAL
MDC_IDC_SET_LEADCHNL_RV_PACING_AMPLITUDE: 2.2 V
MDC_IDC_SET_LEADCHNL_RV_PACING_ANODE_ELECTRODE_1: NORMAL
MDC_IDC_SET_LEADCHNL_RV_PACING_ANODE_LOCATION_1: NORMAL
MDC_IDC_SET_LEADCHNL_RV_PACING_CAPTURE_MODE: NORMAL
MDC_IDC_SET_LEADCHNL_RV_PACING_CATHODE_ELECTRODE_1: NORMAL
MDC_IDC_SET_LEADCHNL_RV_PACING_CATHODE_LOCATION_1: NORMAL
MDC_IDC_SET_LEADCHNL_RV_PACING_POLARITY: NORMAL
MDC_IDC_SET_LEADCHNL_RV_PACING_PULSEWIDTH: 1 MS
MDC_IDC_SET_LEADCHNL_RV_SENSING_ADAPTATION_MODE: NORMAL
MDC_IDC_SET_LEADCHNL_RV_SENSING_ANODE_ELECTRODE_1: NORMAL
MDC_IDC_SET_LEADCHNL_RV_SENSING_ANODE_LOCATION_1: NORMAL
MDC_IDC_SET_LEADCHNL_RV_SENSING_CATHODE_ELECTRODE_1: NORMAL
MDC_IDC_SET_LEADCHNL_RV_SENSING_CATHODE_LOCATION_1: NORMAL
MDC_IDC_SET_LEADCHNL_RV_SENSING_POLARITY: NORMAL
MDC_IDC_SET_LEADCHNL_RV_SENSING_SENSITIVITY: 0.6 MV
MDC_IDC_SET_ZONE_DETECTION_INTERVAL: 300 MS
MDC_IDC_SET_ZONE_DETECTION_INTERVAL: 375 MS
MDC_IDC_SET_ZONE_TYPE: NORMAL
MDC_IDC_SET_ZONE_VENDOR_TYPE: NORMAL
MDC_IDC_STAT_BRADY_RV_PERCENT_PACED: 1 %
MDC_IDC_STAT_EPISODE_RECENT_COUNT: 0
MDC_IDC_STAT_EPISODE_RECENT_COUNT_DTM_END: NORMAL
MDC_IDC_STAT_EPISODE_RECENT_COUNT_DTM_START: NORMAL
MDC_IDC_STAT_EPISODE_TOTAL_COUNT: 0
MDC_IDC_STAT_EPISODE_TOTAL_COUNT: 49
MDC_IDC_STAT_EPISODE_TOTAL_COUNT: 6
MDC_IDC_STAT_EPISODE_TOTAL_COUNT_DTM_END: NORMAL
MDC_IDC_STAT_EPISODE_TYPE: NORMAL
MDC_IDC_STAT_EPISODE_VENDOR_TYPE: NORMAL
MDC_IDC_STAT_TACHYTHERAPY_ATP_DELIVERED_RECENT: 0
MDC_IDC_STAT_TACHYTHERAPY_ATP_DELIVERED_TOTAL: 9
MDC_IDC_STAT_TACHYTHERAPY_RECENT_DTM_END: NORMAL
MDC_IDC_STAT_TACHYTHERAPY_RECENT_DTM_START: NORMAL
MDC_IDC_STAT_TACHYTHERAPY_SHOCKS_ABORTED_RECENT: 0
MDC_IDC_STAT_TACHYTHERAPY_SHOCKS_ABORTED_TOTAL: 0
MDC_IDC_STAT_TACHYTHERAPY_SHOCKS_DELIVERED_RECENT: 0
MDC_IDC_STAT_TACHYTHERAPY_SHOCKS_DELIVERED_TOTAL: 5
MDC_IDC_STAT_TACHYTHERAPY_TOTAL_DTM_END: NORMAL
SPECIMEN SOURCE: NORMAL

## 2019-03-28 NOTE — NURSING NOTE
saw patient in clinic to check in 10 weeks post discharge. Pt reports VAD parameters mostly stable, but continues to have low flow alarms, and weight stable (gaining table weight). Reviewed medications and answered any questions. Patient reports sleeping ok, has chronic issues with insomnia and is woken occasionally by alarms and no anxiety since being home with LVAD. Patient is able to move around the house and care for himself independently.     Discussed specific new problems/stressors since being discharged from the hospital: none at this time. Empathized with patient and reviewed coping strategies: enlisting support from friends and love ones, attending patient and caregiver support groups, reviewing LVAD educational materials to reinforce knowledge, and talking about concerns with family/care providers/trusted others. Encouraged pt to page VAD Coordinator with any issues or questions. Pt verbalizes understanding.

## 2019-04-03 ENCOUNTER — ANTICOAGULATION THERAPY VISIT (OUTPATIENT)
Dept: ANTICOAGULATION | Facility: CLINIC | Age: 59
End: 2019-04-03

## 2019-04-03 DIAGNOSIS — I50.9 HEART FAILURE (H): ICD-10-CM

## 2019-04-03 DIAGNOSIS — Z95.811 LVAD (LEFT VENTRICULAR ASSIST DEVICE) PRESENT (H): ICD-10-CM

## 2019-04-03 LAB
ANION GAP SERPL CALCULATED.3IONS-SCNC: 12 MMOL/L
BUN SERPL-MCNC: 25 MG/DL
CALCIUM SERPL-MCNC: 8.4 MG/DL
CHLORIDE SERPLBLD-SCNC: 102 MMOL/L
CO2 SERPL-SCNC: 23 MMOL/L
CREAT SERPL-MCNC: 1.25 MG/DL
GFR SERPL CREATININE-BSD FRML MDRD: 60 ML/MIN/1.73M2
GLUCOSE SERPL-MCNC: 386 MG/DL (ref 70–99)
INR PPP: 2.6
POTASSIUM SERPL-SCNC: 4.2 MMOL/L
SODIUM SERPL-SCNC: 137 MMOL/L

## 2019-04-03 NOTE — PROGRESS NOTES
ANTICOAGULATION FOLLOW-UP CLINIC VISIT    Patient Name:  Mariusz Sharp  Date:  4/3/2019  Contact Type:  Telephone    SUBJECTIVE:     Patient Findings     Positives:   Change in medications    Comments:   Pt reports he now takes spironolactone 12.5 mg daily           OBJECTIVE    INR   Date Value Ref Range Status   2019 2.6  Final     Chromogenic Factor 10   Date Value Ref Range Status   2019 72 70 - 130 % Final     Comment:     Therapeutic Range:  A Chromogenic Factor 10 level of approximately 20-40%   inversely correlates with an INR of 2-3 for patients receiving Warfarin.   Chromogenic Factor 10 levels below 20% indicate an INR greater than 3 and   levels above 40% indicate an INR less than 2.         ASSESSMENT / PLAN  INR assessment THER    Recheck INR In: 1 WEEK    INR Location Outside lab      Anticoagulation Summary  As of 4/3/2019    INR goal:   2.0-3.0   TTR:   71.5 % (2.4 mo)   INR used for dosin.6 (4/3/2019)   Warfarin maintenance plan:   7.5 mg (5 mg x 1.5) every Mon, Fri; 5 mg (5 mg x 1) all other days   Full warfarin instructions:   7.5 mg every Mon, Fri; 5 mg all other days   Weekly warfarin total:   40 mg   No change documented:   Brenda Alford RN   Plan last modified:   Sophy Griggs RN (3/27/2019)   Next INR check:   4/10/2019   Priority:   INR   Target end date:   Indefinite    Indications    Heart failure (H) [I50.9]  LVAD (left ventricular assist device) present (H) [Z95.811]             Anticoagulation Episode Summary     INR check location:       Preferred lab:       Send INR reminders to:   [facility]    Comments:   LVAD implanted  ASA 81mg Daily Has 2.5mg tablets   [place] [facility] tba=[phone].  Faxed S.O there on 2019        Anticoagulation Care Providers     Provider Role Specialty Phone number    Jenni Ortiz MD [facility] Cardiology [phone]            See the Encounter Report to view Anticoagulation Flowsheet and Dosing Calendar  (Go to Encounters tab in chart review, and find the Anticoagulation Therapy Visit)    Spoke with patient. Gave them their lab results and new warfarin recommendation.  No changes in health or diet. No missed doses, no falls. No signs or symptoms of bleed or clotting.      Brenda Alford RN

## 2019-04-04 ENCOUNTER — CARE COORDINATION (OUTPATIENT)
Dept: CARDIOLOGY | Facility: CLINIC | Age: 59
End: 2019-04-04

## 2019-04-04 NOTE — PROGRESS NOTES
Called pt to review labs drawn yesterday. Labs stable with exception of glucose of 386 - pt reports when he as administering his lantus the needle fell of the pen and he is not sure how much insulin he actually received. He reports his BG usually runs 180-200, and insulin is managed by his PCP.  Pt also reported that he missed a payment to his insurance company and doesn't have coverage right now. He is going to call to try and straighten things out. He may have to reschedule his 4/24 appt, but will call back if needed.

## 2019-04-10 NOTE — PROGRESS NOTES
4/10/19 Addendum:  Left message reminding Red he is due for an INR.  Jovana Ruelas RN    3:44 PM:  Red called back--he will have INR checked tomorrow, 4/11/19.  Jovana Ruelas RN

## 2019-04-11 ENCOUNTER — CARE COORDINATION (OUTPATIENT)
Dept: CARDIOLOGY | Facility: CLINIC | Age: 59
End: 2019-04-11

## 2019-04-11 ENCOUNTER — ANTICOAGULATION THERAPY VISIT (OUTPATIENT)
Dept: ANTICOAGULATION | Facility: CLINIC | Age: 59
End: 2019-04-11

## 2019-04-11 DIAGNOSIS — I50.9 HEART FAILURE (H): ICD-10-CM

## 2019-04-11 DIAGNOSIS — Z95.811 LVAD (LEFT VENTRICULAR ASSIST DEVICE) PRESENT (H): ICD-10-CM

## 2019-04-11 LAB — INR PPP: 2.3

## 2019-04-11 NOTE — PROGRESS NOTES
ANTICOAGULATION FOLLOW-UP CLINIC VISIT    Patient Name:  Mariusz Sharp  Date:  2019  Contact Type:  Telephone    SUBJECTIVE:        OBJECTIVE    INR   Date Value Ref Range Status   2019 2.3  Final     Chromogenic Factor 10   Date Value Ref Range Status   2019 72 70 - 130 % Final     Comment:     Therapeutic Range:  A Chromogenic Factor 10 level of approximately 20-40%   inversely correlates with an INR of 2-3 for patients receiving Warfarin.   Chromogenic Factor 10 levels below 20% indicate an INR greater than 3 and   levels above 40% indicate an INR less than 2.         ASSESSMENT / PLAN  No question data found.  Anticoagulation Summary  As of 2019    INR goal:   2.0-3.0   TTR:   74.4 % (2.7 mo)   INR used for dosin.3 (2019)   Warfarin maintenance plan:   7.5 mg (5 mg x 1.5) every Mon, Fri; 5 mg (5 mg x 1) all other days   Full warfarin instructions:   7.5 mg every Mon, Fri; 5 mg all other days   Weekly warfarin total:   40 mg   No change documented:   Sophy Griggs RN   Plan last modified:   Sophy Griggs RN (3/27/2019)   Next INR check:   2019   Priority:   INR   Target end date:   Indefinite    Indications    Heart failure (H) [I50.9]  LVAD (left ventricular assist device) present (H) [Z95.811]             Anticoagulation Episode Summary     INR check location:       Preferred lab:       Send INR reminders to:    [facility]    Comments:   LVAD implanted  ASA 81mg Daily Has 2.5mg tablets   [place] lab xhc=[phone].  Faxed S.O there on 2019        Anticoagulation Care Providers     Provider Role Specialty Phone number    Jenni Ortiz MD Responsible Cardiology [phone]            See the Encounter Report to view Anticoagulation Flowsheet and Dosing Calendar (Go to Encounters tab in chart review, and find the Anticoagulation Therapy Visit)    Left message for patient with results and dosing recommendations. Asked patient to call back  to report any missed doses, falls, signs and symptoms of bleeding or clotting, any changes in health, medication, or diet. Asked patient to call back with any questions or concerns.     Sophy Griggs RN

## 2019-04-11 NOTE — PROGRESS NOTES
Called patient/caregiver to check in 12 weeks post discharge. Pt reports VAD parameters WNL and weight stable. Reviewed medications and answered any questions. Patient reports sleeping well and no anxiety since being home with LVAD. Patient is able to move around the house and care for himself independently.    Discussed specific new problems/stressors since being discharged from the hospital: pt has not had a low flow alarm in 2 days. He has only had 1 episode of dizziness since last clinic visit. Pt had a temporary lapse in insurance - it will be reinstated on 5/1. Pt will begin transplant eval in May. Empathized with patient and reviewed coping strategies: enlisting support from friends and love ones, attending patient and caregiver support groups, reviewing LVAD educational materials to reinforce knowledge, and talking about concerns with family/care providers/trusted others. Encouraged pt to page VAD Coordinator with any issues or questions. Pt verbalizes understanding.

## 2019-04-15 DIAGNOSIS — Z95.811 LVAD (LEFT VENTRICULAR ASSIST DEVICE) PRESENT (H): ICD-10-CM

## 2019-04-15 DIAGNOSIS — I50.22 CHRONIC SYSTOLIC CONGESTIVE HEART FAILURE (H): ICD-10-CM

## 2019-04-15 DIAGNOSIS — I50.22 CHRONIC SYSTOLIC HEART FAILURE (H): ICD-10-CM

## 2019-04-15 RX ORDER — HYDRALAZINE HYDROCHLORIDE 50 MG/1
100 TABLET, FILM COATED ORAL 3 TIMES DAILY
Qty: 360 TABLET | Refills: 5 | Status: SHIPPED | OUTPATIENT
Start: 2019-04-15 | End: 2019-05-01

## 2019-04-25 ENCOUNTER — ANTICOAGULATION THERAPY VISIT (OUTPATIENT)
Dept: ANTICOAGULATION | Facility: CLINIC | Age: 59
End: 2019-04-25

## 2019-04-25 DIAGNOSIS — Z95.811 LVAD (LEFT VENTRICULAR ASSIST DEVICE) PRESENT (H): ICD-10-CM

## 2019-04-25 DIAGNOSIS — I50.9 HEART FAILURE (H): ICD-10-CM

## 2019-04-25 LAB — INR PPP: 2.4

## 2019-04-25 NOTE — PROGRESS NOTES
ANTICOAGULATION FOLLOW-UP CLINIC VISIT    Patient Name:  Mariusz Sharp  Date:  2019  Contact Type:  Telephone    SUBJECTIVE:        OBJECTIVE    INR   Date Value Ref Range Status   2019 2.4  Final     Chromogenic Factor 10   Date Value Ref Range Status   2019 72 70 - 130 % Final     Comment:     Therapeutic Range:  A Chromogenic Factor 10 level of approximately 20-40%   inversely correlates with an INR of 2-3 for patients receiving Warfarin.   Chromogenic Factor 10 levels below 20% indicate an INR greater than 3 and   levels above 40% indicate an INR less than 2.         ASSESSMENT / PLAN  No question data found.  Anticoagulation Summary  As of 2019    INR goal:   2.0-3.0   TTR:   78.2 % (3.1 mo)   INR used for dosin.4 (2019)   Warfarin maintenance plan:   7.5 mg (5 mg x 1.5) every Mon, Fri; 5 mg (5 mg x 1) all other days   Full warfarin instructions:   7.5 mg every Mon, Fri; 5 mg all other days   Weekly warfarin total:   40 mg   Plan last modified:   Sophy Griggs RN (3/27/2019)   Next INR check:   2019   Priority:   INR   Target end date:   Indefinite    Indications    Heart failure (H) [I50.9]  LVAD (left ventricular assist device) present (H) [Z95.811]             Anticoagulation Episode Summary     INR check location:       Preferred lab:       Send INR reminders to:   [facility]    Comments:   LVAD implanted  ASA 81mg Daily Has 2.5mg tablets   [place] lab smv=[phone].  Faxed S.O there on 2019        Anticoagulation Care Providers     Provider Role Specialty Phone number    Jenni Ortiz MD Responsible Cardiology [phone]            See the Encounter Report to view Anticoagulation Flowsheet and Dosing Calendar (Go to Encounters tab in chart review, and find the Anticoagulation Therapy Visit)    Spoke with patient.     Sophy Griggs RN

## 2019-04-26 ENCOUNTER — CARE COORDINATION (OUTPATIENT)
Dept: TRANSPLANT | Facility: CLINIC | Age: 59
End: 2019-04-26

## 2019-04-26 DIAGNOSIS — I50.20 SYSTOLIC HEART FAILURE (H): Primary | ICD-10-CM

## 2019-04-29 DIAGNOSIS — I50.22 CHRONIC SYSTOLIC CONGESTIVE HEART FAILURE (H): ICD-10-CM

## 2019-04-29 DIAGNOSIS — Z95.811 LVAD (LEFT VENTRICULAR ASSIST DEVICE) PRESENT (H): Primary | ICD-10-CM

## 2019-05-01 ENCOUNTER — OFFICE VISIT (OUTPATIENT)
Dept: CARDIOLOGY | Facility: CLINIC | Age: 59
End: 2019-05-01
Attending: INTERNAL MEDICINE
Payer: COMMERCIAL

## 2019-05-01 ENCOUNTER — ANTICOAGULATION THERAPY VISIT (OUTPATIENT)
Dept: ANTICOAGULATION | Facility: CLINIC | Age: 59
End: 2019-05-01

## 2019-05-01 ENCOUNTER — PRE VISIT (OUTPATIENT)
Dept: UROLOGY | Facility: CLINIC | Age: 59
End: 2019-05-01

## 2019-05-01 VITALS
HEIGHT: 64 IN | OXYGEN SATURATION: 96 % | SYSTOLIC BLOOD PRESSURE: 132 MMHG | TEMPERATURE: 97.9 F | HEART RATE: 72 BPM | BODY MASS INDEX: 34.31 KG/M2 | DIASTOLIC BLOOD PRESSURE: 100 MMHG | WEIGHT: 201 LBS

## 2019-05-01 DIAGNOSIS — N17.9 ACUTE RENAL FAILURE, UNSPECIFIED ACUTE RENAL FAILURE TYPE (H): ICD-10-CM

## 2019-05-01 DIAGNOSIS — I50.810 RVF (RIGHT VENTRICULAR FAILURE) (H): ICD-10-CM

## 2019-05-01 DIAGNOSIS — E86.1 HYPOVOLEMIA: ICD-10-CM

## 2019-05-01 DIAGNOSIS — Z95.811 LVAD (LEFT VENTRICULAR ASSIST DEVICE) PRESENT (H): Primary | ICD-10-CM

## 2019-05-01 DIAGNOSIS — I50.9 HEART FAILURE (H): ICD-10-CM

## 2019-05-01 DIAGNOSIS — I50.22 CHRONIC SYSTOLIC CONGESTIVE HEART FAILURE (H): ICD-10-CM

## 2019-05-01 DIAGNOSIS — I25.10 CORONARY ARTERY DISEASE INVOLVING NATIVE CORONARY ARTERY OF NATIVE HEART WITHOUT ANGINA PECTORIS: ICD-10-CM

## 2019-05-01 DIAGNOSIS — Z95.811 LVAD (LEFT VENTRICULAR ASSIST DEVICE) PRESENT (H): ICD-10-CM

## 2019-05-01 DIAGNOSIS — I10 BENIGN ESSENTIAL HYPERTENSION: ICD-10-CM

## 2019-05-01 DIAGNOSIS — I48.91 ATRIAL FIBRILLATION, UNSPECIFIED TYPE (H): ICD-10-CM

## 2019-05-01 DIAGNOSIS — I47.20 VENTRICULAR TACHYCARDIA (H): ICD-10-CM

## 2019-05-01 LAB
ALBUMIN SERPL-MCNC: 3.9 G/DL (ref 3.4–5)
ALP SERPL-CCNC: 60 U/L (ref 40–150)
ALT SERPL W P-5'-P-CCNC: 37 U/L (ref 0–70)
ANION GAP SERPL CALCULATED.3IONS-SCNC: 7 MMOL/L (ref 3–14)
AST SERPL W P-5'-P-CCNC: 24 U/L (ref 0–45)
BILIRUB SERPL-MCNC: 0.8 MG/DL (ref 0.2–1.3)
BUN SERPL-MCNC: 31 MG/DL (ref 7–30)
CALCIUM SERPL-MCNC: 9.1 MG/DL (ref 8.5–10.1)
CHLORIDE SERPL-SCNC: 104 MMOL/L (ref 94–109)
CO2 SERPL-SCNC: 23 MMOL/L (ref 20–32)
CREAT SERPL-MCNC: 1.69 MG/DL (ref 0.66–1.25)
ERYTHROCYTE [DISTWIDTH] IN BLOOD BY AUTOMATED COUNT: 14.6 % (ref 10–15)
GFR SERPL CREATININE-BSD FRML MDRD: 44 ML/MIN/{1.73_M2}
GLUCOSE SERPL-MCNC: 261 MG/DL (ref 70–99)
HCT VFR BLD AUTO: 40.1 % (ref 40–53)
HGB BLD-MCNC: 13 G/DL (ref 13.3–17.7)
INR PPP: 2.31 (ref 0.86–1.14)
LDH SERPL L TO P-CCNC: 132 U/L (ref 85–227)
MCH RBC QN AUTO: 27 PG (ref 26.5–33)
MCHC RBC AUTO-ENTMCNC: 32.4 G/DL (ref 31.5–36.5)
MCV RBC AUTO: 83 FL (ref 78–100)
PLATELET # BLD AUTO: 224 10E9/L (ref 150–450)
POTASSIUM SERPL-SCNC: 5 MMOL/L (ref 3.4–5.3)
PROT SERPL-MCNC: 7.4 G/DL (ref 6.8–8.8)
RBC # BLD AUTO: 4.81 10E12/L (ref 4.4–5.9)
SODIUM SERPL-SCNC: 135 MMOL/L (ref 133–144)
WBC # BLD AUTO: 7.4 10E9/L (ref 4–11)

## 2019-05-01 PROCEDURE — 83615 LACTATE (LD) (LDH) ENZYME: CPT | Performed by: NURSE PRACTITIONER

## 2019-05-01 PROCEDURE — 93750 INTERROGATION VAD IN PERSON: CPT | Mod: ZF | Performed by: NURSE PRACTITIONER

## 2019-05-01 PROCEDURE — 85610 PROTHROMBIN TIME: CPT | Performed by: NURSE PRACTITIONER

## 2019-05-01 PROCEDURE — 99214 OFFICE O/P EST MOD 30 MIN: CPT | Mod: 25 | Performed by: NURSE PRACTITIONER

## 2019-05-01 PROCEDURE — G0463 HOSPITAL OUTPT CLINIC VISIT: HCPCS | Mod: 25,ZF

## 2019-05-01 PROCEDURE — 80053 COMPREHEN METABOLIC PANEL: CPT | Performed by: NURSE PRACTITIONER

## 2019-05-01 PROCEDURE — 85027 COMPLETE CBC AUTOMATED: CPT | Performed by: NURSE PRACTITIONER

## 2019-05-01 PROCEDURE — 36415 COLL VENOUS BLD VENIPUNCTURE: CPT | Performed by: NURSE PRACTITIONER

## 2019-05-01 RX ORDER — CARVEDILOL 12.5 MG/1
12.5 TABLET ORAL 2 TIMES DAILY WITH MEALS
Qty: 60 TABLET | Refills: 3 | Status: SHIPPED | OUTPATIENT
Start: 2019-05-01 | End: 2019-09-04 | Stop reason: DRUGHIGH

## 2019-05-01 RX ORDER — CARVEDILOL 3.12 MG/1
6.25 TABLET ORAL 2 TIMES DAILY WITH MEALS
Qty: 60 TABLET | Refills: 11 | Status: SHIPPED | OUTPATIENT
Start: 2019-05-01 | End: 2019-05-01

## 2019-05-01 RX ORDER — HYDRALAZINE HYDROCHLORIDE 50 MG/1
100 TABLET, FILM COATED ORAL 4 TIMES DAILY
Qty: 120 TABLET | Refills: 3 | Status: SHIPPED | OUTPATIENT
Start: 2019-05-01 | End: 2019-06-26

## 2019-05-01 ASSESSMENT — MIFFLIN-ST. JEOR: SCORE: 1652.73

## 2019-05-01 ASSESSMENT — PAIN SCALES - GENERAL: PAINLEVEL: NO PAIN (0)

## 2019-05-01 NOTE — TELEPHONE ENCOUNTER
Chief Complaint : Return-CYSTO    New Hx/Sx: Hematuria/LVAD    Records/Orders/Proced: CT on 5/3    Pt Contacted: N/A    At Rooming: Urine and CYSTO

## 2019-05-01 NOTE — NURSING NOTE
Chief Complaint   Patient presents with     Follow Up     4 month post VAD Implant follow up    \  Vitals were taken and medications were reconciled.     Evette Garcia RMA  12:55 PM

## 2019-05-01 NOTE — LETTER
"5/1/2019      RE: Mariusz Sharp  [address] [facility], [place] [address]       Dear Colleague,    Thank you for the opportunity to participate in the care of your patient, Mariusz Sharp, at the [facility] HEART [facility] at [facility]. Please see a copy of my visit note below.    HPI: Mariusz \"Red\" is a 58 year old male with a past medical history of CAD (s/p STEMI with ALYSSA to LAD 6/27/18), monomorphic VT s/p ICD placement 7/16/18, LV thrombus, CKD Stage III, PAF, DM Type II, and ICM with EF 20-25% who is now s/p HeartMate 3 LVAD implant 12/31/18 complicated by mild RV dysfunction requiring dobutamine and a mechanical fall and subsequent negative CT of the head for bleed.      He has been seen frequently in LVAD clinic for low flow alarms that were felt to be secondary to high afterload and volume status.  CT didn't show any concern for any cannula twisting.  He was seen last in March and was started on Spironolactone 12.5 mg daily.      Daniel gets dull chest pain that lasts seconds at rest.  Denies any aggravating factors or alleviating factors. No angina.  Over the past few weeks, he has felt more pulsatility and tingling in his left hand, that feels like a zap. He notices that it coincides with the LVAD pumping. He ran out of his Carvedilol and hasn't been taking it for the past few days.     He has been riding his bike up to 3.5 miles. He gets winded on inclines.  Denies SOB, RADER, PND, orthopnea, edema, weight gain, chest pain, palpitations.  He has weekly positional lightheadedness typically between 9:30-11:30 am after taking his medications.  Denies syncopal episodes but feel he has to sit down when the episodes occur. He is gaining weight due to eating too much.  His home weight is 190 lbs.  His weight is up 5 lbs from his baseline.    His brother and son are performing daily driveline dressing changes.  Occasionally when bending over he gets pain near the driveline site.  He notices " low flow alarms with driveline dressing changes.  He is asymptomatic with the low flow alarms.  He still has a small amount of drainage around the site which is unchanged.  Denies HA, neurological changes, hematuria, hematochezia, melena, epistaxis, fever, chills or any concerns for driveline infection.     PAST MEDICAL HISTORY:  Past Medical History:   Diagnosis Date     Acute kidney injury (H)      CKD (chronic kidney disease)      Coronary artery disease      Diabetes mellitus (H)      HTN (hypertension)      Hyperlipidemia      Ischemic cardiomyopathy      Paroxysmal atrial flutter (H)      Systolic heart failure (H)      Ventricular tachycardia (H)        FAMILY HISTORY:  No family history on file.    SOCIAL HISTORY:  Social History     Socioeconomic History     Marital status: Single     Spouse name: None     Number of children: None     Years of education: None     Highest education level: None   Occupational History     None   Social Needs     Financial resource strain: None     Food insecurity:     Worry: None     Inability: None     Transportation needs:     Medical: None     Non-medical: None   Tobacco Use     Smoking status: Never Smoker     Smokeless tobacco: Never Used   Substance and Sexual Activity     Alcohol use: No     Frequency: Never     Drug use: No     Sexual activity: None   Lifestyle     Physical activity:     Days per week: None     Minutes per session: None     Stress: None   Relationships     Social connections:     Talks on phone: None     Gets together: None     Attends [employer] service: None     Active member of club or organization: None     Attends meetings of clubs or organizations: None     Relationship status: None     Intimate partner violence:     Fear of current or ex partner: None     Emotionally abused: None     Physically abused: None     Forced sexual activity: None   Other Topics Concern     None   Social History Narrative     None       CURRENT MEDICATIONS:  Current  Outpatient Medications   Medication Sig Dispense Refill     amiodarone (PACERONE/CODARONE) 200 MG tablet Take 1 tablet (200 mg) by mouth daily 90 tablet 3     aspirin (ASA) 81 MG chewable tablet Take 1 tablet (81 mg) by mouth daily 90 tablet 3     carvedilol (COREG) 3.125 MG tablet Take 2 tablets (6.25 mg) by mouth 2 times daily (with meals) 60 tablet 11     digoxin (LANOXIN) 125 MCG tablet Take 1 tablet (125 mcg) by mouth daily 90 tablet 3     famotidine (PEPCID) 20 MG tablet Take 1 tablet (20 mg) by mouth 2 times daily 180 tablet 3     hydrALAZINE (APRESOLINE) 50 MG tablet Take 2 tablets (100 mg) by mouth 3 times daily 360 tablet 5     insulin aspart (NOVOLOG PEN) 100 UNIT/ML pen Prandial Dosin units per 10 grams of carbohydrate each Meal or Snack.  Only chart total amount of units given.  Do not give if pre-prandial glucose is less than 60 mg/dL. If given at mealtime, administer within 30 minutes of start of meal.       insulin glargine (LANTUS SOLOSTAR PEN) 100 UNIT/ML pen Inject 20 Units Subcutaneous At Bedtime       lisinopril (PRINIVIL/ZESTRIL) 2.5 MG tablet Take 2 tablets (5 mg) by mouth 2 times daily 120 tablet 11     rosuvastatin (CRESTOR) 20 MG tablet Take 1 tablet (20 mg) by mouth At Bedtime 90 tablet 3     spironolactone (ALDACTONE) 25 MG tablet Take 0.5 tablets (12.5 mg) by mouth daily 45 tablet 3     warfarin (COUMADIN) 2.5 MG tablet Take two to three tablets daily or as directed by the Coumadin clinic 150 tablet 5     insulin aspart (NOVOLOG PEN) 100 UNIT/ML pen Inject 1-10 Units Subcutaneous 3 times daily (before meals) Correction Scale - HIGH INSULIN RESISTANCE DOSING     -164 =1 units.   -189 =2.   -214 =3.   -239 =4.   -264 =5.   -289 =6.   -314 =7.   -339 =8.   -364 =9.  BG greater than or equal to 365 give 10 units  To be given with meal insulin, based on pre-meal BG 9 mL 0     insulin aspart (NOVOLOG PEN) 100 UNIT/ML pen Inject 1-7  "Units Subcutaneous At Bedtime HIGH INSULIN RESISTANCE DOSING    Bedtime  For  - 224 give 1 units.   For  - 249 give 2 units.   For  - 274 give 3 units.   For  - 299 give 4 units.   For  - 324 give 5 units.   For  - 349 give 6 units.   For BG greater than or equal to 350 give 7 units. (Patient not taking: Reported on 2/11/2019) 2.1 mL 0     insulin glargine (LANTUS SOLOSTAR PEN) 100 UNIT/ML pen Inject 20 Units Subcutaneous At Bedtime 6 mL 0     nitroGLYcerin (NITROSTAT) 0.4 MG sublingual tablet Place 1 tablet (0.4 mg) under the tongue every 5 minutes as needed for chest pain For chest pain place 1 tablet under the tongue every 5 minutes for 3 doses. If symptoms persist 5 minutes after 1st dose call 911. 10 tablet 0       ROS:  Constitutional: Denies fever, chills, or diaphoresis.  No weight gain/loss.   ENT: +HA with prolonged coughing. Denies visual disturbance, hearing loss, epistaxis, vertigo,    Respiratory:  See HPI.     Cardiovascular: As per HPI.   GI: Denies nausea, vomiting, diarrhea, hematemesis, melena, or hematochezia.   : Denies urinary frequency, dysuria, or hematuria.   Integument: Negative for bruising, rash, or pruritis.  Psychiatric: Negative for anxiety, +depression, sleep disturbance, or mood changes.   Neuro: Negative for headaches, syncope, numbness or tingling.   Endocrinology: +DM.  Elevated blood sugars up to 250-400.   Musculoskeletal: Negative for gout, joint stiffness, swelling, or muscle weakness.  L knee pain with climbing stairs.       EXAM:  BP (!) 120/0   Pulse 72   Temp 97.9  F (36.6  C)   Ht 1.626 m (5' 4\")   Wt 91.2 kg (201 lb)   SpO2 96%   BMI 34.50 kg/m     General: alert, articulate, and in no acute distress.  HEENT: normocephalic, atraumatic, anicteric sclera, EOMI, mucosa moist, dentition intact, no cyanosis.    Neck: neck supple. JVP not appreciated.   Heart: LVAD hum present  Lungs: clear, no rales, ronchi, or wheezing.  No " accessory muscle use, respirations unlabored.   Abdomen: soft, distended non-tender, bowel sounds present, no hepatomegaly  Extremities: no clubbing, cyanosis or edema.  No palpable pedal pulses.  Neurological: alert and oriented x 3.  normal speech and affect, no gross motor deficits  Skin:  Dressing CDI.  Skin turgor dry with moderate tenting. No ecchymoses, rashes, or clubbing.       Labs:  CBC RESULTS:  Lab Results   Component Value Date    WBC 7.4 05/01/2019    RBC 4.81 05/01/2019    HGB 13.0 (L) 05/01/2019    HCT 40.1 05/01/2019    MCV 83 05/01/2019    MCH 27.0 05/01/2019    MCHC 32.4 05/01/2019    RDW 14.6 05/01/2019     05/01/2019       CMP RESULTS:  Lab Results   Component Value Date     05/01/2019    POTASSIUM 5.0 05/01/2019    CHLORIDE 104 05/01/2019    CO2 23 05/01/2019    ANIONGAP 7 05/01/2019     (H) 05/01/2019    BUN 31 (H) 05/01/2019    CR 1.69 (H) 05/01/2019    GFRESTIMATED 44 (L) 05/01/2019    GFRESTBLACK 51 (L) 05/01/2019    ARLENE 9.1 05/01/2019    BILITOTAL 0.8 05/01/2019    ALBUMIN 3.9 05/01/2019    ALKPHOS 60 05/01/2019    ALT 37 05/01/2019    AST 24 05/01/2019        INR RESULTS:  Lab Results   Component Value Date    INR 2.31 (H) 05/01/2019       No components found for: CK  Lab Results   Component Value Date    MAG 2.2 01/07/2019     Lab Results   Component Value Date    NTBNP 686 (H) 01/23/2019       Most recent echocardiogram 1/15/19:  Interpretation Summary  LVAD speed optimzation study. HM3 LVAD.  Baseline images (5300 RPM) show normal LV size and severely reduced global LV  function (LVEF<20%.) The ventricular septum is midline, The aortic valve opens  partially with each beat. RV function is not well-assessed.  LVAD inflow cannula is visualized in the LV apex. It appears canted towards the septum, but does not abut the septum. LVAD outflow graft is visualized in the aorta.  Inflow cannula velocities were not obtained. Outflow graft velocities are normal.  Please refer  to the EPIC report for measurements performed at different LVAD  speed settings.    Assessment and Plan:    Red is a 58 year old gentleman with ICM s/p HM III LVAD placement who appears hypovolemic today.  His BUN and creatinine are elevated suggesting he is dry.  I discontinued his spironolactone and encouraged him to drink more water.      His BP is extremely high today.  His doppler MAP is really his systolic MAP as he has a strong radial pulse.  His BP was 132/100 using a manual cuff with a MAP calculated at 110.  I increased his Hydralazine to 100 mg four times daily and increased his Carvedilol to 12.5 mg twice daily for better BP control. I also reinforced that he avoid gaps in medical therapy and refill his medications prior to running out.     1.  Chronic systolic heart failure secondary to ICM.  Stage D  NYHA Class II  ACEi/ARB: yes, Hydralazine 100 mg three times daily. Lisinopril 2.5 mg daily.   BB: yes, Carvedilol 3.125 mg twice daily.   Aldosterone antagonist: yes, Spironolactone 12.5 mg daily.   SCD prophylaxis: ICD  Fluid status: Hypovolemic.    2.  S/P LVAD implant as BTT   Anticoagulation: Warfarin INR goal 2-3  Antiplatelet:  ASA 81 mg daily.   MAP:  110.  Increase antihypertensive regimen as outlined above.   LDH:  132  VAD Interrogation today: VAD interrogation reviewed with VAD coordinator. Agree with findings. Several low flow alarms noted. Continuous PI events, no power spikes, speed drops, or other findings suspicious of pump malfunction noted.     3.  CAD:  Stable.  Continue Lisinopril, Carvedilol, Rosuvastatin, Hydralazine, and ASA.    4.  Afib/VT:  Device check from today pending at the time of this note.  Chads Vasc score: 4.  Continue Warfarin with INR goal 2-3. Continue amiodarone and Carvedilol for rate control.       5. ANDREW on CKD:  Suspect due to hypovolemia in the setting of spironolactone.  Discontinue spironolactone.    6.  RV failure:  Continue digoxin.  Last digoxin level 1.0  in January. Repeat digoxin level with next blood draw.     7.  Follow-up:  Dr. Ortiz in June as previously arranged.       Pippa Rodriguez APRN, CNP, CHFN  Advanced Heart Failure/LVAD clinic

## 2019-05-01 NOTE — LETTER
Patient Name: Mariusz Sharp     : 1960     Diagnosis/ICD-10: Heart Failure, unspecified I50.9; LVAD 295.811   Requesting Physician: Dr. Jenni Ortiz   Date of Request: 19     Date to Draw 19     **Please fax results to Marcy Shahid VAD Coord at [phone].                Call [phone] with Questions      ORDER CODE TESTS YOHANA.VOL.   X CBASIC Na, K, Cl, CO2, Crea, BUN, Glu, Ca GG 0.5-1    BHEPAT Alb, AlkP, ALT, AST, BBil, TP GG 0.5-1    BLIP Chol, Trig, HDL, LDL GG 1-2    CCOMP Na, K, Cl, CO2, Crea, BUN, Glu, Ca, Alb, AlkP, ALT, AST, Bbil, TP,  GG 0.6-1    HGP CBC & Platelet P 0.3-1    HGDP CBC, Differential & Platelet P 0.3-1    PLT Platelet  P 0.3-1    INR INR B 2.7    PTT PTT B 2.7    LD Lactate Dehydrogenase GG 0.3-1    PHGB Plasma Hemoglobin GG 2-3    Pre-Albumin Pre-Albumin RG 1.0      Signed,      Jenni Ortiz MD  Heart Failure, Mechanical Circulatory Support and Transplant Cardiology   of Medicine,  Division of Cardiology, [facility]

## 2019-05-01 NOTE — PROGRESS NOTES
ANTICOAGULATION FOLLOW-UP CLINIC VISIT    Patient Name:  Mariusz Sharp  Date:  2019  Contact Type:  Telephone    SUBJECTIVE:        OBJECTIVE    INR   Date Value Ref Range Status   2019 2.31 (H) 0.86 - 1.14 Final     Chromogenic Factor 10   Date Value Ref Range Status   2019 72 70 - 130 % Final     Comment:     Therapeutic Range:  A Chromogenic Factor 10 level of approximately 20-40%   inversely correlates with an INR of 2-3 for patients receiving Warfarin.   Chromogenic Factor 10 levels below 20% indicate an INR greater than 3 and   levels above 40% indicate an INR less than 2.         ASSESSMENT / PLAN  No question data found.  Anticoagulation Summary  As of 2019    INR goal:   2.0-3.0   TTR:   79.6 % (3.3 mo)   INR used for dosin.31 (2019)   Warfarin maintenance plan:   7.5 mg (5 mg x 1.5) every Mon, Fri; 5 mg (5 mg x 1) all other days   Full warfarin instructions:   7.5 mg every Mon, Fri; 5 mg all other days   Weekly warfarin total:   40 mg   No change documented:   Sophy Griggs RN   Plan last modified:   Sophy Griggs RN (3/27/2019)   Next INR check:   5/15/2019   Priority:   INR   Target end date:   Indefinite    Indications    Heart failure (H) [I50.9]  LVAD (left ventricular assist device) present (H) [Z95.811]             Anticoagulation Episode Summary     INR check location:       Preferred lab:       Send INR reminders to:    [facility]    Comments:   LVAD implanted  ASA 81mg Daily Has 2.5mg tablets   [facility] pyw=[phone].  Faxed S.O there on 2019        Anticoagulation Care Providers     Provider Role Specialty Phone number    Jenni Ortiz MD Responsible Cardiology [phone]            See the Encounter Report to view Anticoagulation Flowsheet and Dosing Calendar (Go to Encounters tab in chart review, and find the Anticoagulation Therapy Visit)    Spoke with patient.     Sophy Griggs RN

## 2019-05-01 NOTE — PATIENT INSTRUCTIONS
Medications:  1. STOP Spironolactone  2. INCREASE Carvedilol to 12.5mg twice daily  3. INCREASE Hydralazine to 100mg four times daily    Follow-up:  1. 6/26 with Dr. Ortiz  2. Schedule an appt with an NP 3 months from your appt with Dr. Ortiz    Instructions:  1. Get labs drawn in 1 week (metabolic panel)  2. Get blood pressure checked at cardiac rehab in 1 week and call results to Marcy - may need to further adjust your medications  3. You may try using the weekly dressing. Stop using if irritation occurs or the biopatch gets saturated with drainage - notify Marcy if this occurs.    Page the VAD Coordinator on call if you gain more than 3 lb in a day or 5 in a week. Please also page if you feel unwell or have alarms.     Great to see you in clinic today. To Page the VAD Coordinator on call, dial [phone] option #4 and ask to speak to the VAD coordinator on call.

## 2019-05-01 NOTE — NURSING NOTE
1). PUMP DATA  Primary controller serial number: [id number]     3:   Flow: 3 L/min,    Speed: 5300 RPMs,     PI: 10 ,  Power: 4.1 Parker, Hct: 40.1 (did not change hematocrit setting due to frequent low flow alarms)     Primary controller   Back up battery: Patient use: 12, Replace in: 23  Months     Data downloaded: No   Equipment and driveline assessed for damage: Yes     Back up : Serial number: [id number]  Back up battery: Patient use: 7 Replace in: 27  Months  Programmed settings identical to the settings on the primary controller :Yes      Education complete: Yes   Charge the BACKUP controller s backup battery every 6 months  Perform a self test on BACKUP every 6 months  Change the MPU s batteries every 6 months:Yes    2). ALARMS  Alarms reported by patient since last pump evaluation: Yes - low flow alarms overnight and this am. Sporadically over the last few weeks  Alarms or other finding noted during pump data history and alarm download: Yes - continuous PI events, likely due to HTN.     Action Taken:  Reviewed data with patient: Yes      3). DRESSING CHANGE / DRIVELINE ASSESSMENT  Dressing change completed today: Yes  Appearance of Driveline site: scant/small amt of serous drainage. Seal is not intact, however hub of driveline is visible, and pushes against the skin. It is possible that pt may never achieve a seal. Debrided a slight amount of slough. Pt denied pain.     Driveline stabilization: Method: Centurion - pt reports occasional irritation at site and takes anchor off. Instructed pt to use tape to anchor driveline when centurion is not in place.   [ Teaching reinforced on need for stabilization of Driveline. ]    4).Pt. Education    D:  Pt education provided.  I:  Pt s with HeartMate educated on the following topics:  1. Reviewed Care of Batteries.  a. When to rotate.  b. When to calibrate.  c. When they .  d. When to clean Contacts.  2. Reviewed MPU   a. When to change  batteries.  3. Reviewed Backup Controller  a. When to charge.  b. When to do self-test.  4. Inspected pt. s wearables  5. Pt given a handout with a Maintenance schedule.

## 2019-05-01 NOTE — PROGRESS NOTES
"HPI: Mariusz \"Red\" is a 58 year old male with a past medical history of CAD (s/p STEMI with ALYSSA to LAD 6/27/18), monomorphic VT s/p ICD placement 7/16/18, LV thrombus, CKD Stage III, PAF, DM Type II, and ICM with EF 20-25% who is now s/p HeartMate 3 LVAD implant 12/31/18 complicated by mild RV dysfunction requiring dobutamine and a mechanical fall and subsequent negative CT of the head for bleed.      He has been seen frequently in LVAD clinic for low flow alarms that were felt to be secondary to high afterload and volume status.  CT didn't show any concern for any cannula twisting.  He was seen last in March and was started on Spironolactone 12.5 mg daily.      Daniel gets dull chest pain that lasts seconds at rest.  Denies any aggravating factors or alleviating factors. No angina.  Over the past few weeks, he has felt more pulsatility and tingling in his left hand, that feels like a zap. He notices that it coincides with the LVAD pumping. He ran out of his Carvedilol and hasn't been taking it for the past few days.     He has been riding his bike up to 3.5 miles. He gets winded on inclines.  Denies SOB, RADER, PND, orthopnea, edema, weight gain, chest pain, palpitations.  He has weekly positional lightheadedness typically between 9:30-11:30 am after taking his medications.  Denies syncopal episodes but feel he has to sit down when the episodes occur. He is gaining weight due to eating too much.  His home weight is 190 lbs.  His weight is up 5 lbs from his baseline.    His brother and son are performing daily driveline dressing changes.  Occasionally when bending over he gets pain near the driveline site.  He notices low flow alarms with driveline dressing changes.  He is asymptomatic with the low flow alarms.  He still has a small amount of drainage around the site which is unchanged.  Denies HA, neurological changes, hematuria, hematochezia, melena, epistaxis, fever, chills or any concerns for driveline infection. "     PAST MEDICAL HISTORY:  Past Medical History:   Diagnosis Date     Acute kidney injury (H)      CKD (chronic kidney disease)      Coronary artery disease      Diabetes mellitus (H)      HTN (hypertension)      Hyperlipidemia      Ischemic cardiomyopathy      Paroxysmal atrial flutter (H)      Systolic heart failure (H)      Ventricular tachycardia (H)        FAMILY HISTORY:  No family history on file.    SOCIAL HISTORY:  Social History     Socioeconomic History     Marital status: Single     Spouse name: None     Number of children: None     Years of education: None     Highest education level: None   Occupational History     None   Social Needs     Financial resource strain: None     Food insecurity:     Worry: None     Inability: None     Transportation needs:     Medical: None     Non-medical: None   Tobacco Use     Smoking status: Never Smoker     Smokeless tobacco: Never Used   Substance and Sexual Activity     Alcohol use: No     Frequency: Never     Drug use: No     Sexual activity: None   Lifestyle     Physical activity:     Days per week: None     Minutes per session: None     Stress: None   Relationships     Social connections:     Talks on phone: None     Gets together: None     Attends Mu-ism service: None     Active member of club or organization: None     Attends meetings of clubs or organizations: None     Relationship status: None     Intimate partner violence:     Fear of current or ex partner: None     Emotionally abused: None     Physically abused: None     Forced sexual activity: None   Other Topics Concern     None   Social History Narrative     None       CURRENT MEDICATIONS:  Current Outpatient Medications   Medication Sig Dispense Refill     amiodarone (PACERONE/CODARONE) 200 MG tablet Take 1 tablet (200 mg) by mouth daily 90 tablet 3     aspirin (ASA) 81 MG chewable tablet Take 1 tablet (81 mg) by mouth daily 90 tablet 3     carvedilol (COREG) 3.125 MG tablet Take 2 tablets (6.25 mg)  by mouth 2 times daily (with meals) 60 tablet 11     digoxin (LANOXIN) 125 MCG tablet Take 1 tablet (125 mcg) by mouth daily 90 tablet 3     famotidine (PEPCID) 20 MG tablet Take 1 tablet (20 mg) by mouth 2 times daily 180 tablet 3     hydrALAZINE (APRESOLINE) 50 MG tablet Take 2 tablets (100 mg) by mouth 3 times daily 360 tablet 5     insulin aspart (NOVOLOG PEN) 100 UNIT/ML pen Prandial Dosin units per 10 grams of carbohydrate each Meal or Snack.  Only chart total amount of units given.  Do not give if pre-prandial glucose is less than 60 mg/dL. If given at mealtime, administer within 30 minutes of start of meal.       insulin glargine (LANTUS SOLOSTAR PEN) 100 UNIT/ML pen Inject 20 Units Subcutaneous At Bedtime       lisinopril (PRINIVIL/ZESTRIL) 2.5 MG tablet Take 2 tablets (5 mg) by mouth 2 times daily 120 tablet 11     rosuvastatin (CRESTOR) 20 MG tablet Take 1 tablet (20 mg) by mouth At Bedtime 90 tablet 3     spironolactone (ALDACTONE) 25 MG tablet Take 0.5 tablets (12.5 mg) by mouth daily 45 tablet 3     warfarin (COUMADIN) 2.5 MG tablet Take two to three tablets daily or as directed by the Coumadin clinic 150 tablet 5     insulin aspart (NOVOLOG PEN) 100 UNIT/ML pen Inject 1-10 Units Subcutaneous 3 times daily (before meals) Correction Scale - HIGH INSULIN RESISTANCE DOSING     -164 =1 units.   -189 =2.   -214 =3.   -239 =4.   -264 =5.   -289 =6.   -314 =7.   -339 =8.   -364 =9.  BG greater than or equal to 365 give 10 units  To be given with meal insulin, based on pre-meal BG 9 mL 0     insulin aspart (NOVOLOG PEN) 100 UNIT/ML pen Inject 1-7 Units Subcutaneous At Bedtime HIGH INSULIN RESISTANCE DOSING    Bedtime  For  - 224 give 1 units.   For  - 249 give 2 units.   For  - 274 give 3 units.   For  - 299 give 4 units.   For  - 324 give 5 units.   For  - 349 give 6 units.   For BG greater than or equal to 350 give  "7 units. (Patient not taking: Reported on 2/11/2019) 2.1 mL 0     insulin glargine (LANTUS SOLOSTAR PEN) 100 UNIT/ML pen Inject 20 Units Subcutaneous At Bedtime 6 mL 0     nitroGLYcerin (NITROSTAT) 0.4 MG sublingual tablet Place 1 tablet (0.4 mg) under the tongue every 5 minutes as needed for chest pain For chest pain place 1 tablet under the tongue every 5 minutes for 3 doses. If symptoms persist 5 minutes after 1st dose call 911. 10 tablet 0       ROS:  Constitutional: Denies fever, chills, or diaphoresis.  No weight gain/loss.   ENT: +HA with prolonged coughing. Denies visual disturbance, hearing loss, epistaxis, vertigo,    Respiratory:  See HPI.     Cardiovascular: As per HPI.   GI: Denies nausea, vomiting, diarrhea, hematemesis, melena, or hematochezia.   : Denies urinary frequency, dysuria, or hematuria.   Integument: Negative for bruising, rash, or pruritis.  Psychiatric: Negative for anxiety, +depression, sleep disturbance, or mood changes.   Neuro: Negative for headaches, syncope, numbness or tingling.   Endocrinology: +DM.  Elevated blood sugars up to 250-400.   Musculoskeletal: Negative for gout, joint stiffness, swelling, or muscle weakness.  L knee pain with climbing stairs.       EXAM:  BP (!) 120/0   Pulse 72   Temp 97.9  F (36.6  C)   Ht 1.626 m (5' 4\")   Wt 91.2 kg (201 lb)   SpO2 96%   BMI 34.50 kg/m    General: alert, articulate, and in no acute distress.  HEENT: normocephalic, atraumatic, anicteric sclera, EOMI, mucosa moist, dentition intact, no cyanosis.    Neck: neck supple. JVP not appreciated.   Heart: LVAD hum present  Lungs: clear, no rales, ronchi, or wheezing.  No accessory muscle use, respirations unlabored.   Abdomen: soft, distended non-tender, bowel sounds present, no hepatomegaly  Extremities: no clubbing, cyanosis or edema.  No palpable pedal pulses.  Neurological: alert and oriented x 3.  normal speech and affect, no gross motor deficits  Skin:  Dressing CDI.  Skin turgor " dry with moderate tenting. No ecchymoses, rashes, or clubbing.       Labs:  CBC RESULTS:  Lab Results   Component Value Date    WBC 7.4 05/01/2019    RBC 4.81 05/01/2019    HGB 13.0 (L) 05/01/2019    HCT 40.1 05/01/2019    MCV 83 05/01/2019    MCH 27.0 05/01/2019    MCHC 32.4 05/01/2019    RDW 14.6 05/01/2019     05/01/2019       CMP RESULTS:  Lab Results   Component Value Date     05/01/2019    POTASSIUM 5.0 05/01/2019    CHLORIDE 104 05/01/2019    CO2 23 05/01/2019    ANIONGAP 7 05/01/2019     (H) 05/01/2019    BUN 31 (H) 05/01/2019    CR 1.69 (H) 05/01/2019    GFRESTIMATED 44 (L) 05/01/2019    GFRESTBLACK 51 (L) 05/01/2019    ARLENE 9.1 05/01/2019    BILITOTAL 0.8 05/01/2019    ALBUMIN 3.9 05/01/2019    ALKPHOS 60 05/01/2019    ALT 37 05/01/2019    AST 24 05/01/2019        INR RESULTS:  Lab Results   Component Value Date    INR 2.31 (H) 05/01/2019       No components found for: CK  Lab Results   Component Value Date    MAG 2.2 01/07/2019     Lab Results   Component Value Date    NTBNP 686 (H) 01/23/2019       Most recent echocardiogram 1/15/19:  Interpretation Summary  LVAD speed optimzation study. HM3 LVAD.  Baseline images (5300 RPM) show normal LV size and severely reduced global LV  function (LVEF<20%.) The ventricular septum is midline, The aortic valve opens  partially with each beat. RV function is not well-assessed.  LVAD inflow cannula is visualized in the LV apex. It appears canted towards the septum, but does not abut the septum. LVAD outflow graft is visualized in the aorta.  Inflow cannula velocities were not obtained. Outflow graft velocities are normal.  Please refer to the EPIC report for measurements performed at different LVAD  speed settings.    Assessment and Plan:    Red is a 58 year old gentleman with ICM s/p HM III LVAD placement who appears hypovolemic today.  His BUN and creatinine are elevated suggesting he is dry.  I discontinued his spironolactone and encouraged him  to drink more water.      His BP is extremely high today.  His doppler MAP is really his systolic MAP as he has a strong radial pulse.  His BP was 132/100 using a manual cuff with a MAP calculated at 110.  I increased his Hydralazine to 100 mg four times daily and increased his Carvedilol to 12.5 mg twice daily for better BP control. I also reinforced that he avoid gaps in medical therapy and refill his medications prior to running out.     1.  Chronic systolic heart failure secondary to ICM.  Stage D  NYHA Class II  ACEi/ARB: yes, Hydralazine 100 mg three times daily. Lisinopril 2.5 mg daily.   BB: yes, Carvedilol 3.125 mg twice daily.   Aldosterone antagonist: yes, Spironolactone 12.5 mg daily.   SCD prophylaxis: ICD  Fluid status: Hypovolemic.    2.  S/P LVAD implant as BTT   Anticoagulation: Warfarin INR goal 2-3  Antiplatelet:  ASA 81 mg daily.   MAP:  110.  Increase antihypertensive regimen as outlined above.   LDH:  132  VAD Interrogation today: VAD interrogation reviewed with VAD coordinator. Agree with findings. Several low flow alarms noted. Continuous PI events, no power spikes, speed drops, or other findings suspicious of pump malfunction noted.     3.  CAD:  Stable.  Continue Lisinopril, Carvedilol, Rosuvastatin, Hydralazine, and ASA.    4.  Afib/VT:  Device check from today pending at the time of this note.  Chads Vasc score: 4.  Continue Warfarin with INR goal 2-3. Continue amiodarone and Carvedilol for rate control.       5. ANDREW on CKD:  Suspect due to hypovolemia in the setting of spironolactone.  Discontinue spironolactone.    6.  RV failure:  Continue digoxin.  Last digoxin level 1.0 in January. Repeat digoxin level with next blood draw.     7.  Follow-up:  Dr. Ortiz in June as previously arranged.       Pippa Rodriguez APRN, CNP, CHFN  Advanced Heart Failure/LVAD clinic

## 2019-05-02 ENCOUNTER — APPOINTMENT (OUTPATIENT)
Dept: TRANSPLANT | Facility: CLINIC | Age: 59
End: 2019-05-02
Attending: INTERNAL MEDICINE
Payer: COMMERCIAL

## 2019-05-02 ENCOUNTER — ANCILLARY PROCEDURE (OUTPATIENT)
Dept: BONE DENSITY | Facility: CLINIC | Age: 59
End: 2019-05-02
Attending: INTERNAL MEDICINE
Payer: COMMERCIAL

## 2019-05-02 DIAGNOSIS — I50.20 SYSTOLIC HEART FAILURE (H): ICD-10-CM

## 2019-05-02 NOTE — PROGRESS NOTES
Transplant Teaching    Writer met with patient to complete heart transplant teaching. We reviewed the candidate selection process, insurance prior authorization, UNOS priority statuses, the waiting period, donor issues (inclduing PHS risk), and the pre-, prema-, and post-op periods. In addition, we discussed some of the side effects of prednisone including elevated blood sugars, muscle weakness, sun sensitivity, and mood changes. We also reviewed the major risks of transplantation including rejection, infection and transplant vasculopathy. We also discussed patient and caregiver responsibilities before and after transplant including the need for patient to identify a caregiver to be present for education and to assist patient post discharge. Throughout the 90-minute session, the patient was attentive, eager to learn, and asked appropriate questions.  Patient  was given a copy of the UNOS brochure on Multiple Waitlisting, the  Heart Transplant brochure including current program statistics, and a copy of the transplant handbook for review.  Gave patient the transplant office toll-free number and encouraged to call with future questions or concerns.    In follow up, the patient was instructed on completing the following items for his/her transplant evaluation:    Having cystoscopy 5-3-19 for hematuria  Dexa scan

## 2019-05-03 ENCOUNTER — OFFICE VISIT (OUTPATIENT)
Dept: UROLOGY | Facility: CLINIC | Age: 59
End: 2019-05-03
Payer: COMMERCIAL

## 2019-05-03 ENCOUNTER — ANCILLARY PROCEDURE (OUTPATIENT)
Dept: CT IMAGING | Facility: CLINIC | Age: 59
End: 2019-05-03
Attending: UROLOGY
Payer: COMMERCIAL

## 2019-05-03 ENCOUNTER — CARE COORDINATION (OUTPATIENT)
Dept: CARDIOLOGY | Facility: CLINIC | Age: 59
End: 2019-05-03

## 2019-05-03 VITALS
DIASTOLIC BLOOD PRESSURE: 64 MMHG | BODY MASS INDEX: 32.44 KG/M2 | WEIGHT: 190 LBS | SYSTOLIC BLOOD PRESSURE: 101 MMHG | HEART RATE: 39 BPM | HEIGHT: 64 IN

## 2019-05-03 DIAGNOSIS — R31.0 GROSS HEMATURIA: Primary | ICD-10-CM

## 2019-05-03 DIAGNOSIS — R31.0 GROSS HEMATURIA: ICD-10-CM

## 2019-05-03 RX ORDER — IOPAMIDOL 755 MG/ML
123 INJECTION, SOLUTION INTRAVASCULAR ONCE
Status: COMPLETED | OUTPATIENT
Start: 2019-05-03 | End: 2019-05-03

## 2019-05-03 RX ADMIN — IOPAMIDOL 123 ML: 755 INJECTION, SOLUTION INTRAVASCULAR at 09:04

## 2019-05-03 ASSESSMENT — MIFFLIN-ST. JEOR: SCORE: 1602.83

## 2019-05-03 ASSESSMENT — PAIN SCALES - GENERAL
PAINLEVEL: NO PAIN (0)
PAINLEVEL: MODERATE PAIN (5)

## 2019-05-03 ASSESSMENT — PULMONARY FUNCTION TESTS: FEV1 (%PREDICTED): 2

## 2019-05-03 ASSESSMENT — NEW YORK HEART ASSOCIATION (NYHA) CLASSIFICATION: NYHA FUNCTIONAL CLASS: IV

## 2019-05-03 NOTE — TELEPHONE ENCOUNTER
MCS Episodes    Linked  Type Noted    LVAD MECHANICAL CIRCULATORY DEVICE 07/25/2018    Episodes of Care      VAD Selection    VAD Multidisciplinary Review   Multidisciplinary review date: 12/21/18   Inclusion Criteria   Discussed VAD with patient and/or decision makers. Reviewed anticipated survival benefit, anticipated functional improvement, risks, and benefits. Patient and/or decision maker verbalize understanding and agree to VAD implant?: Yes   VAD is elective procedure?: Yes   NYHA class: IV   INTERMACS score: 4   Patient has: failed to respond to optimal medical management for at least 45 of the last 60 days   Cardiopulmonary stress testing completed?: Yes   Cardiopulmonary stress test detail: MVO2 < 14 ml/kg/min   LVEF is: < 25%   Exclusion Criteria   Has condition, other than heart failure, which would limit survival to < 2 years?: No   Cardiomyopathy with restrictive physiology, unless severe LV systolic failure and LV dilation present?: No   Technical obstacles that pose and inordinately high surgical risk in the judgment of the MCS surgeon?: No   Active systemic infection? (unless ALL of following criteria met: negative blood cultures x 2 days, antibiotic treatment x 7 days and Infectious Disease clearance pre-operatively): No   Presence of active malignancy AND life expectancy < 2 years?: No   Stroke within the last six weeks, unless cleared by neurology team: No   Diagnosed with severe, irreversible pulmonary disease (supplemental O2 usage, FEV1 < 50% predicted): No   Pulmonary hypertension as a primary diagnosis: No   Chronic dialysis: No   Evidence of significant peripheral vascular disease accompanied by resting leg pain or ulceration unless treatment plan is in place: No   Carotid artery disease (>80% extracranial stenosis on Doppler) that could result in an adverse neurological event if left untreated: No   Evidence of an untreated abdominal aortic aneurysm >5 cm as measured by abdominal  ultrasounds within the last 180 days unless treatment plan in place: No   Severe or irreversible hepatic disease, evidence of shock liver, and/o biopsy-proven cirrhosis: No   Active contraindication to anticoagulation, INR > 2.5 in absence of concurrent anticoagulation therapy, platelet < 50,000, history of intolerance to anticoagulation, or other coagulopathy unless Hematology consulted and plan is in place: No   Acute valvular infective endocarditis with bacteremia: No   Neuromuscular disease, psychiatric diagnosis, dementia or other process that severely compromises ability to use and care for external system components or to ambulate/exercise: No   Inability to understand the procedure, risk involved, or to comply with follow-up evaluation by VAD team: No   For menstruating females: Current pregnancy or unable/unwilling to follow contraception plan: Not applicable   Relative Contraindications   Alcohol and/or recreational drug abuse within past six months: No   Significant history of depression or other mental illness, refractory to therapy or thought to be a risk to successful outcome with MCS, as per assessment of trained professional: No   Demonstrated on-going non-compliance with demonstrated inability to comply with medical recommendations on multiple occasions: No   Unsafe living environment or lack of adequate support system: No   Lack of adequate insurance coverage or waiver by hospital administration: No   Evidence of other ongoing physical, financial, or psychosocial issues that will preclude the intended goal of safety and improvements in quality and duration of life, unless a plan for resolution and support is in process: No   Multidisciplinary Decision   Decision: Approved    Implant as: Destination Therapy   Ineligible for transplant reason: Other   Specify: patient preference, clinical deterioration, poor blood group- will plan to re-address once stabilized on LVAD

## 2019-05-03 NOTE — PROGRESS NOTES
Pt called and left message reporting BP today at clinic appt was 101/64 (MAP 76). BP meds were increased on 5/1 at clinic visit with Pippa Rodriguez NP. Pt reports he has dizziness about 2-3hrs after he takes his meds, and it lasts for about 1hr. Pt will continue to monitor BP at cardiac rehab and notify us if it gets lower or causes dizziness that does not resolve. Reinforced to use caution with position changes. Pt verbalized understanding.

## 2019-05-03 NOTE — LETTER
5/3/2019     RE: Mariusz Sharp  [address] [place] [facility] [address]     Dear Colleague,    Thank you for referring your patient, Mariusz Sharp, to the [facility] UROLOGY AND INST FOR PROSTATE AND UROLOGIC CANCERS at [facility]. Please see a copy of my visit note below.    PRE-PROCEDURE DIAGNOSIS: gross hematuria   POST-PROCEDURE DIAGNOSIS: gross hematuria   PROCEDURE: Cystoscopy  HISTORY: Mariusz Sharp is a 58 year old male with with significant cardiac history currently with LVAD. Has had gross hematuria.   REVIEW OF OFFICE STUDIES (e.g., uroflow or PVR):   DESCRIPTION OF PROCEDURE: After informed consent was obtained, the patient was brought to the procedure room where he was placed in the supine position with all pressure points well padded.  The penis and scrotum were prepped and draped in a sterile fashion. A flexible cystoscope was introduced through a well-lubricated urethra. Anterior urethra strictures were absent.   The urinary sphincter was intact.  The prostate demonstrated bilobar hypertrophy.  Bladder neck was open.   Bladder signififcant for presence of the following:      Diverticuli: absent      Cellules: absent      Trabeculation: present 1      Tumors: absent      Stones: absent  The flexible cystoscope was removed and the findings were described to the patient.   ASSESSMENT AND PLAN: 58 year old male with gross hematuria   No etiology for gross hematuria noted on cystoscopy   Noted to have large lower pole stone, defer treatment at present   Will perform 24 hr urine x2 for stone prevention work up   Again, thank you for allowing me to participate in the care of your patient.      Sincerely,    Megan Ibarra MD

## 2019-05-03 NOTE — DISCHARGE INSTRUCTIONS

## 2019-05-03 NOTE — NURSING NOTE
Chief Complaint   Patient presents with     Cystoscopy     Hematuria       Christine Gustafson MA    Invasive Procedure Safety Checklist:    Procedure:     Action: Complete sections and checkboxes as appropriate.    Pre-procedure:  1. Patient ID Verified with 2 identifiers (Malou and  or MRN) : YES    2. Procedure and site verified with patient/designee (when able) : YES    3. Accurate consent documentation in medical record : YES    4. H&P (or appropriate assessment) documented in medical record : NO  H&P must be up to 30 days prior to procedure an updated within 24 hours of                 Procedure as applicable.     5. Relevant diagnostic and radiology test results appropriately labeled and displayed as applicable : NO    6. Blood products, implants, devices, and/or special equipment available for the procedure as applicable : NO    7. Procedure site(s) marked with provider initials [Exclusions: none] : NO    8. Marking not required. Reason : Yes  Procedure does not require site marking    Time Out:     Time-Out performed immediately prior to starting procedure, including verbal and active participation of all team members addressing: YES    1. Correct patient identity.  2. Confirmed that the correct side and site are marked.  3. An accurate procedure to be done.  4. Agreement on the procedure to be done.  5. Correct patient position.  6. Relevant images and results are properly labeled and appropriately displayed.  7. The need to administer antibiotics or fluids for irrigation purposes during the procedure as applicable.  8. Safety precautions based on patient history or medication use.    During Procedure: Verification of correct person, site, and procedure occurs any time the responsibility for care of the patient is transferred to another member of the care team.

## 2019-05-03 NOTE — PATIENT INSTRUCTIONS
"Please do litholink and follow up with Bonnie       It was a pleasure meeting with you today.  Thank you for allowing me and my team the privilege of caring for you today.  YOU are the reason we are here, and I truly hope we provided you with the excellent service you deserve.  Please let us know if there is anything else we can do for you so that we can be sure you are leaving completely satisfied with your care experience.          AFTER YOUR CYSTOSCOPY        You have just completed a cystoscopy, or \"cysto\", which allowed your physician to learn more about your bladder (or to remove a stent placed after surgery). We suggest that you continue to avoid caffeine, fruit juice, and alcohol for the next 24 hours, however, you are encouraged to return to your normal activities.         A few things that are considered normal after your cystoscopy:     * Small amount of bleeding (or spotting) that clears within the next 24 hours     * Slight burning sensation with urination     * Sensation to of needing to avoid more frequently     * The feeling of \"air\" in your urine     * Mild discomfort that is relieved with Tylenol        Please contact our office promptly if you:     * Develop a fever above 101 degrees     * Are unable to urinate     * Develop bright red blood that does not stop     * Severe pain or swelling         Please contact our office with any concerns or questions @[email or web address].  "

## 2019-05-06 ENCOUNTER — CARE COORDINATION (OUTPATIENT)
Dept: CARDIOLOGY | Facility: CLINIC | Age: 59
End: 2019-05-06

## 2019-05-06 NOTE — PROGRESS NOTES
Pt called today to report BP at cardiac rehab was 113/76. He reports dizziness is slightly better than last week. No further questions or concerns at this time.

## 2019-05-08 RX ORDER — LIDOCAINE HYDROCHLORIDE 20 MG/ML
JELLY TOPICAL ONCE
Status: DISCONTINUED | OUTPATIENT
Start: 2019-05-08 | End: 2019-07-08

## 2019-05-08 NOTE — PROGRESS NOTES
PRE-PROCEDURE DIAGNOSIS: gross hematuria   POST-PROCEDURE DIAGNOSIS: gross hematuria   PROCEDURE: Cystoscopy  HISTORY: Mariusz Sharp is a 58 year old male with with significant cardiac history currently with LVAD. Has had gross hematuria.   REVIEW OF OFFICE STUDIES (e.g., uroflow or PVR):   DESCRIPTION OF PROCEDURE: After informed consent was obtained, the patient was brought to the procedure room where he was placed in the supine position with all pressure points well padded.  The penis and scrotum were prepped and draped in a sterile fashion. A flexible cystoscope was introduced through a well-lubricated urethra. Anterior urethra strictures were absent.   The urinary sphincter was intact.  The prostate demonstrated bilobar hypertrophy.  Bladder neck was open.   Bladder signififcant for presence of the following:      Diverticuli: absent      Cellules: absent      Trabeculation: present 1      Tumors: absent      Stones: absent  The flexible cystoscope was removed and the findings were described to the patient.   ASSESSMENT AND PLAN: 58 year old male with gross hematuria   No etiology for gross hematuria noted on cystoscopy   Noted to have large lower pole stone, defer treatment at present   Will perform 24 hr urine x2 for stone prevention work up

## 2019-05-13 ENCOUNTER — CARE COORDINATION (OUTPATIENT)
Dept: CARDIOLOGY | Facility: CLINIC | Age: 59
End: 2019-05-13

## 2019-05-13 NOTE — PROGRESS NOTES
Called pt to follow-up on BP checks and labs. Pt will get bmp checked on Wednesday when he is getting his INR checked. BP at cardiac rehab before exercise was 110/76. He exercised for about 35 min and then got dizzy. Cardiac Rehab tried to check a BP but couldn't get a reading. After about 10min of rest, BP was 80/60. Discussed findings with Pippa Rodriguez. Will continue to monitor for now. May consider increasing Coreg in the future if BP remains elevated.

## 2019-05-15 ENCOUNTER — ANTICOAGULATION THERAPY VISIT (OUTPATIENT)
Dept: ANTICOAGULATION | Facility: CLINIC | Age: 59
End: 2019-05-15

## 2019-05-15 DIAGNOSIS — I50.9 HEART FAILURE (H): ICD-10-CM

## 2019-05-15 DIAGNOSIS — Z95.811 LVAD (LEFT VENTRICULAR ASSIST DEVICE) PRESENT (H): ICD-10-CM

## 2019-05-15 LAB
ANION GAP SERPL CALCULATED.3IONS-SCNC: 7 MMOL/L
BUN SERPL-MCNC: 35 MG/DL
CALCIUM SERPL-MCNC: 8.3 MG/DL
CHLORIDE SERPLBLD-SCNC: 104 MMOL/L
CO2 SERPL-SCNC: 24 MMOL/L
CREAT SERPL-MCNC: 1.5 MG/DL
GFR SERPL CREATININE-BSD FRML MDRD: 48 ML/MIN/1.73M2
GLUCOSE SERPL-MCNC: 254 MG/DL (ref 70–99)
INR PPP: 2.7
POTASSIUM SERPL-SCNC: 4.6 MMOL/L
SODIUM SERPL-SCNC: 135 MMOL/L

## 2019-05-15 NOTE — PROGRESS NOTES
ANTICOAGULATION FOLLOW-UP CLINIC VISIT    Patient Name:  Mariusz Sharp  Date:  5/15/2019  Contact Type:  Telephone    SUBJECTIVE:  Patient Findings     Comments:   Patient reports stopping Spironolactone on .        Clinical Outcomes     Comments:   Patient reports stopping Spironolactone on .           OBJECTIVE    INR   Date Value Ref Range Status   05/15/2019 2.7  Final     Chromogenic Factor 10   Date Value Ref Range Status   2019 72 70 - 130 % Final     Comment:     Therapeutic Range:  A Chromogenic Factor 10 level of approximately 20-40%   inversely correlates with an INR of 2-3 for patients receiving Warfarin.   Chromogenic Factor 10 levels below 20% indicate an INR greater than 3 and   levels above 40% indicate an INR less than 2.         ASSESSMENT / PLAN  No question data found.  Anticoagulation Summary  As of 5/15/2019    INR goal:   2.0-3.0   TTR:   82.0 % (3.8 mo)   INR used for dosin.7 (5/15/2019)   Warfarin maintenance plan:   7.5 mg (5 mg x 1.5) every Mon, Fri; 5 mg (5 mg x 1) all other days   Full warfarin instructions:   7.5 mg every Mon, Fri; 5 mg all other days   Weekly warfarin total:   40 mg   Plan last modified:   Sophy Griggs RN (3/27/2019)   Next INR check:   2019   Priority:   INR   Target end date:   Indefinite    Indications    Heart failure (H) [I50.9]  LVAD (left ventricular assist device) present (H) [Z95.811]             Anticoagulation Episode Summary     INR check location:       Preferred lab:       Send INR reminders to:   [facility]    Comments:   LVAD implanted  ASA 81mg Daily Has 2.5mg tablets   [place] [facility] vbd=[phone].  Faxed S.O there on 2019        Anticoagulation Care Providers     Provider Role Specialty Phone number    Jenni Ortiz MD [facility] Cardiology [phone]            See the Encounter Report to view Anticoagulation Flowsheet and Dosing Calendar (Go to Encounters tab in chart review, and find the  Anticoagulation Therapy Visit)    Spoke with patient.     Sophy Griggs RN

## 2019-05-16 ENCOUNTER — TRANSFERRED RECORDS (OUTPATIENT)
Dept: HEALTH INFORMATION MANAGEMENT | Facility: CLINIC | Age: 59
End: 2019-05-16

## 2019-05-21 ENCOUNTER — TEAM CONFERENCE (OUTPATIENT)
Dept: TRANSPLANT | Facility: CLINIC | Age: 59
End: 2019-05-21

## 2019-05-21 ENCOUNTER — TELEPHONE (OUTPATIENT)
Dept: TRANSPLANT | Facility: CLINIC | Age: 59
End: 2019-05-21

## 2019-05-21 NOTE — TELEPHONE ENCOUNTER
"Advanced Heart Failure Presentation  Date of Presentation:  19  Presenting MD:  Dr. Angel Sharp  Gender: male  : 1960  58 year old    Presenting For:  Transplant (VAD to Tx)     ABO    A    Height/Weight/BMI  Wt Readings from Last 2 Encounters:   19 86.2 kg (190 lb)   19 91.2 kg (201 lb)     Wt Readings from Last 2 Encounters:   19 86.2 kg (190 lb)   19 91.2 kg (201 lb)     Estimated body mass index is 32.61 kg/m  as calculated from the following:    Height as of 5/3/19: 1.626 m (5' 4\").    Weight as of 5/3/19: 86.2 kg (190 lb).    Immunology  PRA:    Date:  18  Result:  0%      Evaluation Summary:  Results of the evaluation have been reviewed and have been documented in the patient's medical record.     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 His LVAD postoperative course was complicated by mild RV dysfunction  Requiring dobutamine but he was successfully  discharged to TCU. He had a mechanical fall with CTH negative for bleed and has been seen at core clinic with low flow alarms that are asymptomatic thought to be related to afterload and volume status. CT was done there is no problem with the LVAD positioning. RHC yesterday showed normal Right and L sided filling pressures CO/CI 3.7/2.0. He states the alarms are not as often as they used to happen they usually happen when he is going to the bathroom. He endorses some dizziness that has happened about 4 times after he takes his medicines.         1.  CPX  N/A-patient with VAD    2.  RHC  Date:  19  RA:    PA:  , 18  PAW:  15/13/12  William CO/CI:  3.7/2.0  PVR:  1.6  PaSat: 52      3.  Echo (Turndown)  Date:  01/15/19    Summary:  LVAD speed optimzation study. " HM3 LVAD.  Baseline images (5300 RPM) show normal LV size and severely reduced global LV  function (LVEF<20%.) The ventricular septum is midline, The aortic valve opens  partially with each beat. RV function is not well-assessed.  LVAD inflow cannula is visualized in the LV apex. It appears canted towards  the septum, but does not abut the septum.  LVAD outflow graft is visualized in the aorta.LVAD speed optimzation study. HM3 LVAD.  Baseline images (5300 RPM) show normal LV size and severely reduced global LV  function (LVEF<20%.) The ventricular septum is midline, The aortic valve opens  partially with each beat. RV function is not well-assessed.  LVAD inflow cannula is visualized in the LV apex. It appears canted towards  the septum, but does not abut the septum.  LVAD outflow graft is visualized in the aorta.      EF:  <20%  LVIDd:  4.6cm  LVIDs:  3.4cm      4.  Social Work-review Epic notes and report from     5.  Neuropsych-review Epic notes and report from     6.  Abnormals/Incomplete Testing:    Abnormal UA-Urology Consulted (high PSA, kidney cyst/stone)  PRA-needs updated labs  Virology testing-done at outside lab (needs updates at next visit, done in 2017)  Vaccinations:  Hep B series

## 2019-05-21 NOTE — PROGRESS NOTES
Called patient/caregiver to check in 16 weeks post discharge. Pt reports VAD parameters WNL and weight stable. Reviewed medications and answered any questions. Patient reports sleeping ok and no anxiety since being home with LVAD. Patient is able to move around the house and care for him/herself independently.     Discussed specific new problems/stressors since being discharged from the hospital: occasional dizziness (see previous note). Empathized with patient and reviewed coping strategies: enlisting support from friends and love ones, attending patient and caregiver support groups, reviewing LVAD educational materials to reinforce knowledge, and talking about concerns with family/care providers/trusted others. Encouraged pt to page VAD Coordinator with any issues or questions. Pt verbalizes understanding.

## 2019-05-23 ENCOUNTER — COMMITTEE REVIEW (OUTPATIENT)
Dept: TRANSPLANT | Facility: CLINIC | Age: 59
End: 2019-05-23

## 2019-05-23 NOTE — COMMITTEE REVIEW
Thoracic Committee Review Note     Evaluation Date: 4/26/2019  Committee Review Date: 5/24/2019    Organ being evaluated for: Heart    Transplant Phase: Evaluation  Transplant Status: Active    Transplant Coordinator: Patience Kaplan  Transplant MD:  Jenni Ortiz       Primary Diagnosis: Dilated Myopathy: Ischemic      Committee Review Members:  Cardiology LISA ORTIZ, RN, Thang Collins, RN, Marcy Shahid, RN   Nurse Practitioner - Adult Health Marcy Harper, APRN CNP   Nutrition Caitlyn Gastelum, RD   Pharmacist Sam Ortiz, [facility]    - Clinical Kalyn Gonzalez, [facility]   Transplant Elder Willis MD, Jenni Ortiz MD, Hugo Sánchez MD, Abraham Romo MD, Ashley Polk PsyD, Margie Colorado MD, Patience Kaplan RN, Alec Greer MD, Carlos Eduardo Conner, GERMAN, Kamaljit Baker MD, Nasreen Nielson MD, Gerardo Stone MD, Artemio Ulloa MD       Transplant Eligibility: Disabling heart disease on maximal medical therapy    Committee Review Decision: Approved    Relative Contraindications: None    Absolute Contraindications: Other    Committee Chair Jenni Ortiz MD verbally attested to the committee's decision.    Committee Discussion Details:     59yo, present for txp, has HM3, ABO A, BMI 33, 0%PRA, VT, NICM, some mild RV dysfunction, lots of sporadic low flow alarms with multiple evaluations. BP related? RA 11, wedge 12 CI 2, PVR 1.6 PA 30/12. RV OK on echo. SW has no concerns, Neuro Psych no concerns. Abnormals: hematuria, no bladder CA. Needs RPA updated soon, needs virology and vaccinations updated. Patience to follow up with urology to see if they have any concerns.     Urology consulted, cleared for transplant.  Discussed with Dr. Ortiz.  Called patient to discuss last week and did not hear back, called patient on 5-28-19 and discussed listing with

## 2019-05-28 ENCOUNTER — CARE COORDINATION (OUTPATIENT)
Dept: TRANSPLANT | Facility: CLINIC | Age: 59
End: 2019-05-28

## 2019-05-28 NOTE — LETTER
May 29, 2019    May 29, 2019  Mariusz Sharp  : 1960  ICD10:  I50    Subject: Vaccination Update Request and Labs    Mariusz Sharp is a potential heart transplant recipient currently being followed by the [facility].  We would like to request your assistance in obtaining the following tests and/or procedures in your local community to assist in the care of our mutual patient:    Vaccines:    Tetanus, diphtheria, Pertussis (Dtap) if none in 10 years and Tdap booster every 10 years  Hepatitis B vaccine series (if Hepatitis B surface antibody negative).  If titers are negative after 6 months, patient will need a repeat 3 shot vaccine series  Hepatitis A vaccine (if Hepatitis A surface antibody negative)-for high risk patents:  foreign born in high risk areas, exposure to adoptees from high risk areas, travel to high risk areas  Pneumociccal vaccine-according to ACIP guidelines for PCV13 and PPSV23   Influenza vaccine annually   Varivax (Chicken Pox) if varicella titers are negative  (Human papillomavirus) HPV according to current CDC guidelines for patients younger than 26 year old  Zostavax (Shingles) vaccine for patients older than 60 years old    Labs:  CMV, EBV, toxoplasmosis, RPR, PRA (send out lab, patient will bring tubes for lab draw)    Please fax results as soon as they are available to:   Thoracic Transplant Department  Fax Number:  [phone]    Thank you  for your continued support and the opportunity to collaborate in the care of this patient.  If you have any questions, please call Patience Kaplan, Transplant Coordinator,  at [phone] (option 2) or [phone].    .    Patience Kaplan RN, BSN  Heart Transplant Coordinator  [facility]    CC:  Mariusz Sharp

## 2019-05-28 NOTE — LETTER
Mariusz Sharp  [address] [place] [address]      May 31, 2019    Dear Mr. Sharp,    This letter is written in follow-up to your recent heart transplant evaluation at the [facility], [place]. As you are aware, we have receivedcoverage approval from your insurance company(s), and therefore your name was added to the [facility] heart transplant waiting list on 5-28-19 as Status 4.    During the waiting period, an ALA or PRA ( antibody ) blood sample will need to be sent to the [place] every 3 months.  This immunology blood sample will be used to match you with potential organ donors.  Your next sample should be drawn around ASA, kits were sent to your home.    There is a standing order for these labs at the [facility] and you can obtain them at any [place] clinic.  If you prefer to go to an outside clinic and/or through your primary care provider, please notify your transplant coordinator for further instructions and lab kits.  If you live out of the metro area or out of state, we will be sending you  kits to send in your labs.  These will be sent to the [facility] to be resulted.  Your results will be relayed to you in a letter once the labs are resulted.  If there are any changes in your antibody levels, your transplant coordinator will contact you.      In addition, you or your doctor(s) must notify the Transplant office at the number above if you should acquire any significant infections (such as pneumonia or abscesses), and/or experience any significant changes in your medical condition, and/or require hospitalization. During business hours 8:30 AM - 4:00 PM Monday -Friday, please notify us at the above number. During non-business hours and on weekends or holidays, please use the emergency number,[phone] to notify the On-Call Transplant Coordinator.     Our program has physician and surgeon coverage 24 hours a day, 365 days a year. If this coverage changes  or there are substantial program changes, you will be notified in writing by letter.     This letter will also serve as a reminder to complete your dental work and obtain any necessary cancer screening tests required before your transplant. This includes colonoscopy, as well as prostate screening for men, andmammogram and gynecologic testing for women. Your primary care clinic can assist you with arranging for these exams. Please remind your caregivers to forward copies of your records and final reports once your dental work and cancer screening is complete.     Finally, attached is a letter from the [employer] ([employer]). It describes the services and information offered to patients by [employer] and the Organ Procurement and Transplantation Network.      Sincerely,     Patience Kaplan RN  Transplant Coordinator  Thoracic Transplant Program    Encl. [employer] Letter.

## 2019-05-29 ENCOUNTER — ANTICOAGULATION THERAPY VISIT (OUTPATIENT)
Dept: ANTICOAGULATION | Facility: CLINIC | Age: 59
End: 2019-05-29

## 2019-05-29 DIAGNOSIS — Z95.811 LVAD (LEFT VENTRICULAR ASSIST DEVICE) PRESENT (H): ICD-10-CM

## 2019-05-29 DIAGNOSIS — Z94.1 HEART REPLACED BY TRANSPLANT (H): Primary | ICD-10-CM

## 2019-05-29 DIAGNOSIS — I50.9 HEART FAILURE (H): ICD-10-CM

## 2019-05-29 LAB — INR PPP: 1.9

## 2019-05-29 NOTE — PROGRESS NOTES
Patient Update 05/29/19:    Spoke to patient 5-28-19 in regards to transplant listing.  Notified patient that he was approved for transplant and is ready to be put on the waitlist.  Discussed getting vaccinations completed with his PCP and agreed that coordinator will arrange this with the clinic.  Patient also needs updated PRAs and virology which was ordered.  JORGE Wood, is sending PRA kits and letter was sent with this request as well (included in listing letter).    Called clinic to arrange vaccination appointment and labs.  Spoke to RNChristine, and she reports that patient is noncompliant with follow up, has no showed or cancelled multiple appointments in the last 6 months.  She reports that she spoke with the patient as well, as he needs a diabetes follow up.  The clinic has also sent letters to the patient in regards to follow up.      Left message for Red and asked him to call coordinator back in regards to these issues and encouraged him to call clinic to make his appointments as compliance with his PCP is necessary for transplant.     Red called back and explained there was some insurance issues and also a computer system change at the clinic and possibly an error with his phone number.  He is aware what appointments he needs and will follow up.

## 2019-05-29 NOTE — PROGRESS NOTES
ANTICOAGULATION FOLLOW-UP CLINIC VISIT    Patient Name:  Mariusz Sharp  Date:  2019  Contact Type:  Telephone    SUBJECTIVE:  Patient Findings     Comments:   No Problems found. No Changes in Health, Medications, or diet. No Signs of bruising, bleeding or clotting.        Clinical Outcomes     Comments:   No Problems found. No Changes in Health, Medications, or diet. No Signs of bruising, bleeding or clotting.           OBJECTIVE    INR   Date Value Ref Range Status   2019 1.9  Final     Chromogenic Factor 10   Date Value Ref Range Status   2019 72 70 - 130 % Final     Comment:     Therapeutic Range:  A Chromogenic Factor 10 level of approximately 20-40%   inversely correlates with an INR of 2-3 for patients receiving Warfarin.   Chromogenic Factor 10 levels below 20% indicate an INR greater than 3 and   levels above 40% indicate an INR less than 2.         ASSESSMENT / PLAN  INR assessment SUB    Recheck INR In: 2 DAYS    INR Location Outside lab      Anticoagulation Summary  As of 2019    INR goal:   2.0-3.0   TTR:   82.6 % (4.3 mo)   INR used for dosin.9! (2019)   Warfarin maintenance plan:   7.5 mg (5 mg x 1.5) every Mon, Fri; 5 mg (5 mg x 1) all other days   Full warfarin instructions:   : 7.5 mg; : 7.5 mg; Otherwise 7.5 mg every Mon, Fri; 5 mg all other days   Weekly warfarin total:   40 mg   Plan last modified:   Sophy Griggs RN (3/27/2019)   Next INR check:   2019   Priority:   INR   Target end date:   Indefinite    Indications    Heart failure (H) [I50.9]  LVAD (left ventricular assist device) present (H) [Z95.811]             Anticoagulation Episode Summary     INR check location:       Preferred lab:       Send INR reminders to:   [facility]    Comments:   LVAD implanted 18  ASA 81mg Daily Has 2.5mg tablets   [place] [facility] fnt=[phone].  Faxed S.O there on 2019        Anticoagulation Care Providers     Provider Role Specialty Phone  number    Jenni Ortiz MD Responsible Cardiology [phone]            See the Encounter Report to view Anticoagulation Flowsheet and Dosing Calendar (Go to Encounters tab in chart review, and find the Anticoagulation Therapy Visit)    Spoke with Red  Patient had LVAD placed on:   12/31/18  Patient's current Aspirin dose: Increase to 325mgs daily until INR is therapeutic  LVAD Protocol followed: Yes    Tom Soto RPH

## 2019-05-29 NOTE — PROGRESS NOTES
"Transplant Listing 05/28/19    Mariusz Sharp was approved for activation on the heart transplant waitlist by the Heart Transplant Committee and added to the heart waitlist on 05/29/19  See committee review documentation for further information.    ABO verification complete:  YES  2nd ABO verified by:  Melody COHEN listing status:  Status 4  Ht Readings from Last 2 Encounters:   05/03/19 1.626 m (5' 4\")   05/01/19 1.626 m (5' 4\")     Wt Readings from Last 2 Encounters:   05/03/19 86.2 kg (190 lb)   05/01/19 91.2 kg (201 lb)     ABO:  A    On-call transplant coordinators, patient's LVAD coordinator, transplant LPN, immunology and PFR notified.          "

## 2019-05-30 ENCOUNTER — TELEPHONE (OUTPATIENT)
Dept: TRANSPLANT | Facility: CLINIC | Age: 59
End: 2019-05-30

## 2019-05-30 NOTE — TELEPHONE ENCOUNTER
I called Mariusz and he agreed to go and have his PRA's drawn as soon as he gets the mailers. I ordered them to be sent 5/29/19

## 2019-05-31 ENCOUNTER — ANTICOAGULATION THERAPY VISIT (OUTPATIENT)
Dept: ANTICOAGULATION | Facility: CLINIC | Age: 59
End: 2019-05-31

## 2019-05-31 DIAGNOSIS — Z95.811 LVAD (LEFT VENTRICULAR ASSIST DEVICE) PRESENT (H): ICD-10-CM

## 2019-05-31 DIAGNOSIS — I50.9 HEART FAILURE (H): ICD-10-CM

## 2019-05-31 LAB — INR PPP: 2

## 2019-05-31 NOTE — PROGRESS NOTES
ANTICOAGULATION FOLLOW-UP CLINIC VISIT    Patient Name:  Mariusz Shapr  Date:  2019  Contact Type:  Telephone    SUBJECTIVE:  Patient Findings     Comments:   Patient thinks he might have missed his dose on .         Clinical Outcomes     Comments:   Patient thinks he might have missed his dose on .            OBJECTIVE    INR   Date Value Ref Range Status   2019 2.0  Final     Chromogenic Factor 10   Date Value Ref Range Status   2019 72 70 - 130 % Final     Comment:     Therapeutic Range:  A Chromogenic Factor 10 level of approximately 20-40%   inversely correlates with an INR of 2-3 for patients receiving Warfarin.   Chromogenic Factor 10 levels below 20% indicate an INR greater than 3 and   levels above 40% indicate an INR less than 2.         ASSESSMENT / PLAN  No question data found.  Anticoagulation Summary  As of 2019    INR goal:   2.0-3.0   TTR:   81.3 % (4.3 mo)   INR used for dosin.0 (2019)   Warfarin maintenance plan:   7.5 mg (5 mg x 1.5) every Mon, Wed, Fri; 5 mg (5 mg x 1) all other days   Full warfarin instructions:   7.5 mg every Mon, Wed, Fri; 5 mg all other days   Weekly warfarin total:   42.5 mg   Plan last modified:   Sophy Griggs RN (2019)   Next INR check:   2019   Priority:   INR   Target end date:   Indefinite    Indications    Heart failure (H) [I50.9]  LVAD (left ventricular assist device) present (H) [Z95.811]             Anticoagulation Episode Summary     INR check location:       Preferred lab:       Send INR reminders to:    [facility]    Comments:   LVAD implanted 18  ASA 81mg Daily Has 2.5mg tablets   [place] [facility] kpz=[phone].  Faxed S.O there on 2019        Anticoagulation Care Providers     Provider Role Specialty Phone number    Jenni Ortiz MD [facility] Cardiology [phone]            See the Encounter Report to view Anticoagulation Flowsheet and Dosing Calendar (Go to Encounters tab in  chart review, and find the Anticoagulation Therapy Visit)    Spoke with patient.     Sophy Griggs RN

## 2019-06-05 ENCOUNTER — CARE COORDINATION (OUTPATIENT)
Dept: CARDIOLOGY | Facility: CLINIC | Age: 59
End: 2019-06-05

## 2019-06-05 NOTE — PROGRESS NOTES
Called patient/caregiver to check in 20 weeks post discharge. Pt reports VAD parameters mostly WNL - sporadic low flow alarms and occasional dizziness and weight up - pt reports he is eating more and gaining table weight, denies abdominal bloating or edema in lower extremities . Reviewed medications and answered any questions. Patient reports sleeping well and no anxiety since being home with LVAD. Patient is able to move around the house and care for himself independently.    Discussed specific new problems/stressors since being discharged from the hospital: none at this time. Empathized with patient and reviewed coping strategies: enlisting support from friends and love ones, attending patient and caregiver support groups, reviewing LVAD educational materials to reinforce knowledge, and talking about concerns with family/care providers/trusted others. Encouraged pt to page VAD Coordinator with any issues or questions. Pt verbalizes understanding.

## 2019-06-07 ENCOUNTER — ANTICOAGULATION THERAPY VISIT (OUTPATIENT)
Dept: ANTICOAGULATION | Facility: CLINIC | Age: 59
End: 2019-06-07

## 2019-06-07 DIAGNOSIS — I50.9 HEART FAILURE (H): ICD-10-CM

## 2019-06-07 DIAGNOSIS — Z94.1 HEART REPLACED BY TRANSPLANT (H): ICD-10-CM

## 2019-06-07 DIAGNOSIS — Z95.811 LVAD (LEFT VENTRICULAR ASSIST DEVICE) PRESENT (H): ICD-10-CM

## 2019-06-07 LAB — INR PPP: 1.9 (ref 0.9–1.1)

## 2019-06-07 NOTE — PROGRESS NOTES
ANTICOAGULATION FOLLOW-UP CLINIC VISIT    Patient Name:  Mariusz Sharp  Date:  2019  Contact Type:  Telephone    SUBJECTIVE:  Patient Findings     Comments:   Instructed him to increase ASA to 325 mg daily until INR is therapeutic.         Clinical Outcomes     Comments:   Instructed him to increase ASA to 325 mg daily until INR is therapeutic.            OBJECTIVE    INR   Date Value Ref Range Status   2019 1.9 (A) 0.9 - 1.1 Final     Chromogenic Factor 10   Date Value Ref Range Status   2019 72 70 - 130 % Final     Comment:     Therapeutic Range:  A Chromogenic Factor 10 level of approximately 20-40%   inversely correlates with an INR of 2-3 for patients receiving Warfarin.   Chromogenic Factor 10 levels below 20% indicate an INR greater than 3 and   levels above 40% indicate an INR less than 2.         ASSESSMENT / PLAN  INR assessment THER    Recheck INR In: 3 DAYS    INR Location Outside lab      Anticoagulation Summary  As of 2019    INR goal:   2.0-3.0   TTR:   77.2 % (4.6 mo)   INR used for dosin.9! (2019)   Warfarin maintenance plan:   7.5 mg (5 mg x 1.5) every Mon, Wed, Fri; 5 mg (5 mg x 1) all other days   Full warfarin instructions:   : 7.5 mg; Otherwise 7.5 mg every Mon, Wed, Fri; 5 mg all other days   Weekly warfarin total:   42.5 mg   Plan last modified:   Sophy Griggs RN (2019)   Next INR check:   6/10/2019   Priority:   INR   Target end date:   Indefinite    Indications    Heart failure (H) [I50.9]  LVAD (left ventricular assist device) present (H) [Z95.811]             Anticoagulation Episode Summary     INR check location:       Preferred lab:       Send INR reminders to:   [facility]    Comments:   LVAD implanted 18  ASA 81mg Daily Has 2.5mg tablets   [place] lab ekk=[phone].  Faxed S.O there on 2019        Anticoagulation Care Providers     Provider Role Specialty Phone number    Jenni Ortiz MD Responsible Cardiology  [phone]            See the Encounter Report to view Anticoagulation Flowsheet and Dosing Calendar (Go to Encounters tab in chart review, and find the Anticoagulation Therapy Visit)    Left message for patient with results and dosing recommendations. Asked patient to call back to report any missed doses, falls, signs and symptoms of bleeding or clotting, any changes in health, medication, or diet. Asked patient to call back with any questions or concerns.    Patient had LVAD placed on:   12/31/18  Patient's current Aspirin dose: increased to 325 mg daily until INR is therapeutic  LVAD Protocol followed: Yes    Jovana Ruelas RN

## 2019-06-10 ENCOUNTER — ANTICOAGULATION THERAPY VISIT (OUTPATIENT)
Dept: ANTICOAGULATION | Facility: CLINIC | Age: 59
End: 2019-06-10

## 2019-06-10 DIAGNOSIS — Z95.811 LVAD (LEFT VENTRICULAR ASSIST DEVICE) PRESENT (H): ICD-10-CM

## 2019-06-10 DIAGNOSIS — I50.9 HEART FAILURE (H): ICD-10-CM

## 2019-06-10 LAB — INR PPP: 2.8

## 2019-06-10 NOTE — PROGRESS NOTES
ANTICOAGULATION FOLLOW-UP CLINIC VISIT    Patient Name:  Mariusz Sharp  Date:  6/10/2019  Contact Type:  Telephone    SUBJECTIVE:  Patient Findings     Comments:   Left message for patient to take 7.5mg of Coumadin on Wednesday, 5mg all other days.  Instructed in message to reduce Aspirin to 81mg daily.  Check next INR on Friday.        Clinical Outcomes     Comments:   Left message for patient to take 7.5mg of Coumadin on Wednesday, 5mg all other days.  Instructed in message to reduce Aspirin to 81mg daily.  Check next INR on Friday.           OBJECTIVE    INR   Date Value Ref Range Status   06/10/2019 2.8  Final     Comment:     [facility]     Chromogenic Factor 10   Date Value Ref Range Status   2019 72 70 - 130 % Final     Comment:     Therapeutic Range:  A Chromogenic Factor 10 level of approximately 20-40%   inversely correlates with an INR of 2-3 for patients receiving Warfarin.   Chromogenic Factor 10 levels below 20% indicate an INR greater than 3 and   levels above 40% indicate an INR less than 2.         ASSESSMENT / PLAN  INR assessment THER    Recheck INR In: 4 DAYS    INR Location Outside lab      Anticoagulation Summary  As of 6/10/2019    INR goal:   2.0-3.0   TTR:   77.4 % (4.7 mo)   INR used for dosin.8 (6/10/2019)   Warfarin maintenance plan:   7.5 mg (5 mg x 1.5) every Mon, Wed, Fri; 5 mg (5 mg x 1) all other days   Full warfarin instructions:   6/10: 5 mg; Otherwise 7.5 mg every Mon, Wed, Fri; 5 mg all other days   Weekly warfarin total:   42.5 mg   Plan last modified:   Sophy Griggs RN (2019)   Next INR check:   2019   Priority:   INR   Target end date:   Indefinite    Indications    Heart failure (H) [I50.9]  LVAD (left ventricular assist device) present (H) [Z95.811]             Anticoagulation Episode Summary     INR check location:       Preferred lab:       Send INR reminders to:   [facility]    Comments:   LVAD implanted 18  ASA 81mg Daily Has  2.5mg tablets   [place] [facility] egz=[phone].  Faxed S.O there on 2/25/2019        Anticoagulation Care Providers     Provider Role Specialty Phone number    Jenni Ortiz MD Responsible Cardiology [phone]            See the Encounter Report to view Anticoagulation Flowsheet and Dosing Calendar (Go to Encounters tab in chart review, and find the Anticoagulation Therapy Visit)    Left message for patient with results and dosing recommendations. Asked patient to call back to report any missed doses, falls, signs and symptoms of bleeding or clotting, any changes in health, medication, or diet. Asked patient to call back with any questions or concerns.    Patient had LVAD placed on:   12/31/18  Patient's current Aspirin dose: 81mg   LVAD Protocol followed:  Yes.   If Not Followed Explanation:  NA      6/10/19 ADDENDUM  Mariusz called the [facility].  He verbalized understanding of the above recommendations.  Uncertain of reason of INR climbing so quickly.    Sandor Sanchez, RN

## 2019-06-12 LAB
SA1 CELL: NORMAL
SA1 COMMENTS: NORMAL
SA1 HI RISK ABY: NORMAL
SA1 MOD RISK ABY: NORMAL
SA1 TEST METHOD: NORMAL
SA2 CELL: NORMAL
SA2 COMMENTS: NORMAL
SA2 HI RISK ABY UA: NORMAL
SA2 MOD RISK ABY: NORMAL
SA2 TEST METHOD: NORMAL
UNACCEPTABLE ANTIGEN: NORMAL
UNOS CPRA: 0

## 2019-06-14 ENCOUNTER — ANTICOAGULATION THERAPY VISIT (OUTPATIENT)
Dept: ANTICOAGULATION | Facility: CLINIC | Age: 59
End: 2019-06-14

## 2019-06-14 DIAGNOSIS — Z95.811 LVAD (LEFT VENTRICULAR ASSIST DEVICE) PRESENT (H): ICD-10-CM

## 2019-06-14 DIAGNOSIS — I50.9 HEART FAILURE (H): ICD-10-CM

## 2019-06-14 LAB — INR PPP: 3.4

## 2019-06-14 NOTE — PROGRESS NOTES
ANTICOAGULATION FOLLOW-UP CLINIC VISIT    Patient Name:  Mariusz Sharp  Date:  6/14/2019  Contact Type:  Telephone    SUBJECTIVE:  Patient Findings     Positives:   Extra doses    Comments:   Spoke to Mariusz.  Explainable supratherapeutic INR result.  Updated calendar.        Clinical Outcomes     Comments:   Spoke to Mariusz.  Explainable supratherapeutic INR result.  Updated calendar.           OBJECTIVE    INR   Date Value Ref Range Status   06/14/2019 3.4  Final     Comment:     Outside lab result     Chromogenic Factor 10   Date Value Ref Range Status   01/11/2019 72 70 - 130 % Final     Comment:     Therapeutic Range:  A Chromogenic Factor 10 level of approximately 20-40%   inversely correlates with an INR of 2-3 for patients receiving Warfarin.   Chromogenic Factor 10 levels below 20% indicate an INR greater than 3 and   levels above 40% indicate an INR less than 2.         ASSESSMENT / PLAN  INR assessment SUPRA    Recheck INR In: 1 WEEK    INR Location Outside lab      Anticoagulation Summary  As of 6/14/2019    INR goal:   2.0-3.0   TTR:   76.2 % (4.8 mo)   INR used for dosing:   3.4! (6/14/2019)   Warfarin maintenance plan:   7.5 mg (5 mg x 1.5) every Mon, Wed, Fri; 5 mg (5 mg x 1) all other days   Full warfarin instructions:   6/14: 5 mg; Otherwise 7.5 mg every Mon, Wed, Fri; 5 mg all other days   Weekly warfarin total:   42.5 mg   Plan last modified:   Sophy Griggs RN (5/31/2019)   Next INR check:   6/21/2019   Priority:   INR   Target end date:   Indefinite    Indications    Heart failure (H) [I50.9]  LVAD (left ventricular assist device) present (H) [Z95.811]             Anticoagulation Episode Summary     INR check location:       Preferred lab:       Send INR reminders to:   [facility]    Comments:   LVAD implanted 12/31/18  ASA 81mg Daily Has 2.5mg tablets   [place] [facility] qve=[phone].  Faxed S.O there on 2/25/2019        Anticoagulation Care Providers     Provider Role Specialty  Phone number    Jenni Ortiz MD Responsible Cardiology [phone]            See the Encounter Report to view Anticoagulation Flowsheet and Dosing Calendar (Go to Encounters tab in chart review, and find the Anticoagulation Therapy Visit)    Spoke to patient.  He accidentally took 7.5mg last night instead of 5mg.  Updated calendar.  INR result today is via fingerstick.    Patient had LVAD placed on:   12/31/18  Patient's current Aspirin dose: 81mg  LVAD Protocol followed:  Yes.   If Not Followed Explanation:  Sandor Zaman, RN

## 2019-06-17 ENCOUNTER — CARE COORDINATION (OUTPATIENT)
Dept: CARDIOLOGY | Facility: CLINIC | Age: 59
End: 2019-06-17

## 2019-06-17 NOTE — PROGRESS NOTES
This writer called patient to check in and to introduce myself as pt's new temporary VAD Coordinator while his previous VAD Coordinator is away. Spoke to pt and gave him my direct number and also reminded pt to call VAD Coordinator on-call with any urgent needs. Pt verbalized understanding.

## 2019-06-21 ENCOUNTER — ANTICOAGULATION THERAPY VISIT (OUTPATIENT)
Dept: ANTICOAGULATION | Facility: CLINIC | Age: 59
End: 2019-06-21

## 2019-06-21 DIAGNOSIS — Z95.811 LVAD (LEFT VENTRICULAR ASSIST DEVICE) PRESENT (H): ICD-10-CM

## 2019-06-21 DIAGNOSIS — I50.9 HEART FAILURE (H): ICD-10-CM

## 2019-06-21 LAB — INR PPP: 1.6 (ref 0.9–1.1)

## 2019-06-21 NOTE — PROGRESS NOTES
ANTICOAGULATION FOLLOW-UP CLINIC VISIT    Patient Name:  Mariusz Sharp  Date:  2019  Contact Type:  Telephone    SUBJECTIVE:  Patient Findings     Positives:   Change in diet/appetite (Mariusz reports he has started drinking about five 12 ounce cans of V8 juice each week)    Comments:   Mariusz reports he has started drinking V8 juice (about 5 twelve ounce cans/week).  This is new--he states he had 24 ounces of it on 19.  Reminded him that V8 juice contains vitamin K and he can drink it, but it needs to be consistent.  He states he understands this.  He plans to cut back and try to keep it consistent.  Spoke with LVAD coordinator, Carlos Eduardo Conner-- no bridging, just increase warfarin dose.          Clinical Outcomes     Comments:   Mariusz reports he has started drinking V8 juice (about 5 twelve ounce cans/week).  This is new--he states he had 24 ounces of it on 19.  Reminded him that V8 juice contains vitamin K and he can drink it, but it needs to be consistent.  He states he understands this.  He plans to cut back and try to keep it consistent.  Spoke with LVAD coordinator, Carlos Eduardo Conner-- no bridging, just increase warfarin dose.             OBJECTIVE    INR   Date Value Ref Range Status   2019 1.6 (A) 0.9 - 1.1 Final     Chromogenic Factor 10   Date Value Ref Range Status   2019 72 70 - 130 % Final     Comment:     Therapeutic Range:  A Chromogenic Factor 10 level of approximately 20-40%   inversely correlates with an INR of 2-3 for patients receiving Warfarin.   Chromogenic Factor 10 levels below 20% indicate an INR greater than 3 and   levels above 40% indicate an INR less than 2.         ASSESSMENT / PLAN  INR assessment SUB    Recheck INR In: 3 DAYS    INR Location Outside lab      Anticoagulation Summary  As of 2019    INR goal:   2.0-3.0   TTR:   75.2 % (5 mo)   INR used for dosin.6! (2019)   Warfarin maintenance plan:   7.5 mg (5 mg x 1.5) every Mon, Wed, Fri; 5 mg  (5 mg x 1) all other days   Full warfarin instructions:   6/21: 10 mg; 6/22: 7.5 mg; Otherwise 7.5 mg every Mon, Wed, Fri; 5 mg all other days   Weekly warfarin total:   42.5 mg   Plan last modified:   Sophy Griggs RN (5/31/2019)   Next INR check:   6/24/2019   Priority:   INR   Target end date:   Indefinite    Indications    Heart failure (H) [I50.9]  LVAD (left ventricular assist device) present (H) [Z95.811]             Anticoagulation Episode Summary     INR check location:       Preferred lab:       Send INR reminders to:   [facility]    Comments:   LVAD implanted 12/31/18  ASA 81mg Daily Has 2.5mg tablets   [place] [facility] ewy=[phone].  Faxed S.O there on 2/25/2019        Anticoagulation Care Providers     Provider Role Specialty Phone number    Jenni Ortiz MD Responsible Cardiology [phone]            See the Encounter Report to view Anticoagulation Flowsheet and Dosing Calendar (Go to Encounters tab in chart review, and find the Anticoagulation Therapy Visit)    Spoke with Mariusz.  See patient findings.    Patient had LVAD placed on:   12/21/18  Patient's current Aspirin dose: 81 mg daily  LVAD Protocol followed: No--per LVAD coordinator no bridging and no increase in ASA.    Jovana Ruelas RN

## 2019-06-24 ENCOUNTER — ANTICOAGULATION THERAPY VISIT (OUTPATIENT)
Dept: ANTICOAGULATION | Facility: CLINIC | Age: 59
End: 2019-06-24

## 2019-06-24 DIAGNOSIS — Z95.811 LVAD (LEFT VENTRICULAR ASSIST DEVICE) PRESENT (H): ICD-10-CM

## 2019-06-24 DIAGNOSIS — I50.22 CHRONIC SYSTOLIC CONGESTIVE HEART FAILURE (H): Primary | ICD-10-CM

## 2019-06-24 DIAGNOSIS — I50.9 HEART FAILURE (H): ICD-10-CM

## 2019-06-24 LAB — INR PPP: 2.9

## 2019-06-24 NOTE — PROGRESS NOTES
ANTICOAGULATION FOLLOW-UP CLINIC VISIT    Patient Name:  Mariusz Sharp  Date:  2019  Contact Type:  Telephone    SUBJECTIVE:  Patient Findings     Positives:   Change in diet/appetite (Had one can of V-8 yesterday.)             OBJECTIVE    INR   Date Value Ref Range Status   2019 2.9  Final     Chromogenic Factor 10   Date Value Ref Range Status   2019 72 70 - 130 % Final     Comment:     Therapeutic Range:  A Chromogenic Factor 10 level of approximately 20-40%   inversely correlates with an INR of 2-3 for patients receiving Warfarin.   Chromogenic Factor 10 levels below 20% indicate an INR greater than 3 and   levels above 40% indicate an INR less than 2.         ASSESSMENT / PLAN  INR assessment THER    Recheck INR In: 2 DAYS    INR Location Outside lab      Anticoagulation Summary  As of 2019    INR goal:   2.0-3.0   TTR:   75.1 % (5.1 mo)   INR used for dosin.9 (2019)   Warfarin maintenance plan:   7.5 mg (5 mg x 1.5) every Mon, Wed, Fri; 5 mg (5 mg x 1) all other days   Full warfarin instructions:   : 5 mg; : 7.5 mg; Otherwise 7.5 mg every Mon, Wed, Fri; 5 mg all other days   Weekly warfarin total:   42.5 mg   Plan last modified:   Sophy Griggs RN (2019)   Next INR check:   2019   Priority:   INR   Target end date:   Indefinite    Indications    Heart failure (H) [I50.9]  LVAD (left ventricular assist device) present (H) [Z95.811]             Anticoagulation Episode Summary     INR check location:       Preferred lab:       Send INR reminders to:   [facility]    Comments:   LVAD implanted 18  ASA 81mg Daily Has 2.5mg tablets   [place] [facility] pfx=[phone].  Faxed S.O there on 2019        Anticoagulation Care Providers     Provider Role Specialty Phone number    Jenni Ortiz MD [facility] Cardiology [phone]            See the Encounter Report to view Anticoagulation Flowsheet and Dosing Calendar (Go to Encounters tab in  chart review, and find the Anticoagulation Therapy Visit)    Spoke with patient.  Patient had LVAD placed on:   12/21/2018  Patient's current Aspirin dose: 81mg  LVAD Protocol followed: yes   Nasreen Kathleen RN

## 2019-06-25 DIAGNOSIS — I50.22 CHRONIC SYSTOLIC CONGESTIVE HEART FAILURE (H): Primary | ICD-10-CM

## 2019-06-25 NOTE — PROGRESS NOTES
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    His LVAD postoperative course was complicated by mild RV dysfunction  Requiring dobutamine but he was successfully  discharged to TCU. He had a mechanical fall with CTH negative for bleed and has been seen at core clinic with low flow alarms that are asymptomatic thought to be related to afterload and volume status. CT was done there is no problem with the LVAD positioning. We last saw him in February  [facility] the day before clinic had showed normal Right and L sided filling pressures CO/CI 3.7/2.0. He states the alarms are not as often as they used to happen we changed his lisinopril to the pm and stopped imdur given dizziness. He has then had elevated MAP's with titration of medications complicated by recurrent dizziness. The dizziness has not changed much happens more in the am he has some dizziness a couple hrs after he takes his medications.     No fevers no driveline purulence no bleeding LVAD interrogation at 5300RPM hematocrit at 20 because of the low flow alarms he is still having the alarms but not as frequent as he used to maybe twice a day he has a lot of pi events with speed drops and associated dizziness. His weight is going up 207lbs last visit 190. ICD check today no events    Current cardiac meds  Amiodarone 200mg d  Asa  Carvedilol 12.5mg BID  Digoxin 125 mcg  Hydralazine 100mg QID  Lisinopril 2.5mg BID  Warfarin   Rosuvastatin 20mg qhs    PAST MEDICAL HISTORY:  Past Medical History:   Diagnosis Date     Acute kidney injury (H)      CKD (chronic kidney disease)      Coronary artery disease      Diabetes mellitus (H)      HTN (hypertension)      Hyperlipidemia      Ischemic cardiomyopathy      LV (left  ventricular) mural thrombus      Paroxysmal atrial flutter (H)      RVF (right ventricular failure) (H)      Systolic heart failure (H)      Ventricular tachycardia (H)        FAMILY HISTORY:  Reviewed    SOCIAL HISTORY:  Social History     Social History     Marital status: Single     Spouse name: N/A     Number of children: N/A     Years of education: N/A     Social History Main Topics     Smoking status: Never Smoker     Smokeless tobacco: Never Used     Alcohol use Not on file     Drug use: Not on file     Sexual activity: Not on file     Other Topics Concern     Not on file     Social History Narrative       CURRENT MEDICATIONS:  Outpatient Medications Prior to Visit   Medication Sig Dispense Refill     amiodarone (PACERONE/CODARONE) 200 MG tablet Take 1 tablet (200 mg) by mouth daily 90 tablet 3     aspirin (ASA) 81 MG chewable tablet Take 1 tablet (81 mg) by mouth daily 90 tablet 3     carvedilol (COREG) 12.5 MG tablet Take 1 tablet (12.5 mg) by mouth 2 times daily (with meals) 60 tablet 3     digoxin (LANOXIN) 125 MCG tablet Take 1 tablet (125 mcg) by mouth daily 90 tablet 3     famotidine (PEPCID) 20 MG tablet Take 1 tablet (20 mg) by mouth 2 times daily 180 tablet 3     hydrALAZINE (APRESOLINE) 50 MG tablet Take 2 tablets (100 mg) by mouth 4 times daily 120 tablet 3     insulin aspart (NOVOLOG PEN) 100 UNIT/ML pen Prandial Dosin units per 10 grams of carbohydrate each Meal or Snack.  Only chart total amount of units given.  Do not give if pre-prandial glucose is less than 60 mg/dL. If given at mealtime, administer within 30 minutes of start of meal.       insulin glargine (LANTUS SOLOSTAR PEN) 100 UNIT/ML pen Inject 20 Units Subcutaneous At Bedtime       lisinopril (PRINIVIL/ZESTRIL) 2.5 MG tablet Take 2 tablets (5 mg) by mouth 2 times daily 120 tablet 11     rosuvastatin (CRESTOR) 20 MG tablet Take 1 tablet (20 mg) by mouth At Bedtime 90 tablet 3     warfarin (COUMADIN) 2.5 MG tablet Take two to  three tablets daily or as directed by the Coumadin clinic 150 tablet 5     insulin aspart (NOVOLOG PEN) 100 UNIT/ML pen Inject 1-10 Units Subcutaneous 3 times daily (before meals) Correction Scale - HIGH INSULIN RESISTANCE DOSING     -164 =1 units.   -189 =2.   -214 =3.   -239 =4.   -264 =5.   -289 =6.   -314 =7.   -339 =8.   -364 =9.  BG greater than or equal to 365 give 10 units  To be given with meal insulin, based on pre-meal BG 9 mL 0     insulin aspart (NOVOLOG PEN) 100 UNIT/ML pen Inject 1-7 Units Subcutaneous At Bedtime HIGH INSULIN RESISTANCE DOSING    Bedtime  For  - 224 give 1 units.   For  - 249 give 2 units.   For  - 274 give 3 units.   For  - 299 give 4 units.   For  - 324 give 5 units.   For  - 349 give 6 units.   For BG greater than or equal to 350 give 7 units. (Patient not taking: Reported on 2/11/2019) 2.1 mL 0     insulin glargine (LANTUS SOLOSTAR PEN) 100 UNIT/ML pen Inject 20 Units Subcutaneous At Bedtime 6 mL 0     nitroGLYcerin (NITROSTAT) 0.4 MG sublingual tablet Place 1 tablet (0.4 mg) under the tongue every 5 minutes as needed for chest pain For chest pain place 1 tablet under the tongue every 5 minutes for 3 doses. If symptoms persist 5 minutes after 1st dose call 911. 10 tablet 0     Facility-Administered Medications Prior to Visit   Medication Dose Route Frequency Provider Last Rate Last Dose     lidocaine (XYLOCAINE) 2 % external gel   Urethral Once Megan Ibarra MD           ROS:   CONSTITUTIONAL: Denies fever, chills, fatigue, or weight fluctuations.   HEENT: Denies headache and changes in speech. He complains of vision changes.   CV: Refer to HPI.   PULMONARY:Denies shortness of breath, cough, or previous TB exposure.   GI:Denies nausea, vomiting, diarrhea, and abdominal pain. Bowel movements are regular.   :See HPI  EXT:Denies lower extremity edema.   SKIN:Denies abnormal rashes  "or lesions.   MUSCULOSKELETAL:Denies upper or lower extremity weakness and pain.   NEUROLOGIC:Denies lightheadedness, dizziness, seizures, or upper or lower extremity paresthesia.     EXAM:  BP (!) 70/0 (BP Location: Right arm, Patient Position: Chair, Cuff Size: Adult Large)   Pulse (!) 48   Ht 1.626 m (5' 4\")   Wt 93.9 kg (207 lb 1.6 oz)   SpO2 95%   BMI 35.55 kg/m    GENERAL: Appears alert and oriented times three. But is somewhat dizzy  NECK: Supple and without lymphadenopathy. JVD ~6-7cm.  CV: LVAD Humm  RESPIRATORY: Respirations regular, even, and unlabored. Lungs CTA throughout.   GI: Soft and non distended with normoactive bowel sounds present in all quadrants. No tenderness, rebound, guarding. No organomegaly.    EXTREMITIES: No peripheral edema. .   NEUROLOGIC: Alert and orientated x 3. CN II-XII grossly intact. No focal deficits.   MUSCULOSKELETAL: No joint swelling or tenderness.   SKIN: No jaundice. No rashes or lesions.     Labs:      Diagnostics:  Recent Results (from the past 4320 hour(s))   ECHO COMPLETE WITH OPTISON    Narrative    [id number]  [id number]  [id number]  [id number]^STEFANIA^MANNY^V           [facility],[place]  Echocardiography Laboratory  [address] [place], [place] [id number]     Name: ADILSON RINCON  MRN: [id number]  : 1960  Study Date: 2018 11:27 AM  Age: 57 yrs  Gender: Male  [facility] [place]  Reason For Study: Cardiac Arrest  Ordering Physician: MANNY AGUIAR  Referring Physician: SELF, REFERRED  Performed By: Laya Read DYANA     BSA: 1.8 m2  Height: 64 in  Weight: 170 lb  HR: 78  BP: 99/76 mmHg  _____________________________________________________________________________  __        Procedure  Echocardiogram with two-dimensional, color and spectral Doppler performed.  Contrast Optison. Optison (NDC #4474-4080-33) given intravenously. Patient was  given 5 ml mixture of 3 ml Optison and 6 ml saline. 4 ml " wasted.  _____________________________________________________________________________  __        Interpretation Summary  Left ventricular wall thickness is normal. Borderline left ventricular  dilation is present. LVIDd is 5.19cm The Ejection Fraction is estimated at 20-  25%. The mid to distal anteroseptum, mid to distal anterior wall, mid to  distal anterolateral wall and mid to apical septum and apex are akinetic. This  is consistent with large LAD territory infarct. Laminar thrombus is seen in  the LV apex  The right ventricle is normal size. Difficult to evaluate RV function but it  appears to be mild to moderately reduced.  Moderate tricuspid insufficiency is present. Right ventricular systolic  pressure is 26mmHg above the right atrial pressure.  Dilation of the inferior vena cava is present with normal respiratory  variation in diameter. Estimated mean right atrial pressure is 8 mmHg (mildly  elevated).  No pericardial effusion is present.     Previous study not available for comparison.  _____________________________________________________________________________  __        Left Ventricle  Left ventricular wall thickness is normal. Borderline left ventricular  dilation is present. LVIDd is 5.19cm. Grade III or advanced diastolic  dysfunction. The Ejection Fraction is estimated at 20-25%. The mid to distal  anteroseptum, mid to distal anterior  wall, mid to distal anterolateral wall and mid to apical septum and apex are  akinetic. This is consistent with large LAD territory infarct. Laminar  thrombus is seen in the LV apex.     Right Ventricle  Right ventricular wall thickness is normal. The right ventricle is normal  size. Difficult to evaluate RV function but it appears to be mild to  moderately reduced. A pacemaker lead is noted in the right ventricle.     Atria  Moderate left atrial enlargement is present. Moderate right atrial enlargement  is present. A pacemaker lead is noted in the right atrium.      Mitral Valve  The mitral valve is normal. Trace mitral insufficiency is present.        Aortic Valve  Aortic valve is normal in structure and function. The aortic valve is  tricuspid.     Tricuspid Valve  The tricuspid valve is normal. Moderate tricuspid insufficiency is present.  The right ventricular systolic pressure is approximated at 26.0 mmHg plus the  right atrial pressure. Right ventricular systolic pressure is 26mmHg above the  right atrial pressure.     Pulmonic Valve  The pulmonic valve is normal. Trace pulmonic insufficiency is present.     Vessels  The aorta root is normal. Dilation of the inferior vena cava is present with  normal respiratory variation in diameter. Estimated mean right atrial pressure  is 8 mmHg (mildly elevated).     Pericardium  No pericardial effusion is present.        Compared to Previous Study  Previous study not available for comparison.  _____________________________________________________________________________  __  MMode/2D Measurements & Calculations  IVSd: 0.74 cm     LVIDd: 5.2 cm  LVIDs: 4.0 cm  LVPWd: 1.0 cm  FS: 23.3 %  LV mass(C)d: 167.6 grams  LV mass(C)dI: 91.8 grams/m2  asc Aorta Diam: 3.6 cm  LVOT diam: 2.1 cm  LVOT area: 3.5 cm2  LA Volume (BP): 81.1 ml  LA Volume Index (BP): 44.3 ml/m2  RWT: 0.40           Doppler Measurements & Calculations  MV E max sasha: 101.0 cm/sec  MV A max sasha: 16.5 cm/sec  MV E/A: 6.1  MV dec slope: 660.0 cm/sec2  PA V2 max: 96.5 cm/sec  PA max PG: 3.7 mmHg  PA acc time: 0.11 sec  TR max sasha: 255.0 cm/sec  TR max P.0 mmHg  E/E' av.8  Lateral E/e': 11.0  Medial E/e': 34.5     _____________________________________________________________________________  __           Report approved by: LEÓN Powell 2018 02:05 PM                     RHC 18    Right Heart Catheterization:  /95/112  HR 61  BSA 1.91     RA    RV   PA 18   PCW 15/13/12   William CO 3.7   William CI 2.0   TD CO 4.6   TD CI 2.5   PA sat  51.8%  PCW sat 95.6%   Hgb 9.1 g/dL   PVR 1.6  SVR 4.8      Device check 2/11Patient has an appointment to see RADHA Garcia today. Normal ICD function. 1 episodes recorded as NSVT, available EGM shows irregular R-R with rates of  bpm, with this being a single chamber device it makes it difficult to determine if the rhythm is NSVT or Afib with RVR. Pt reports being on Coumadin.  Intrinsic rhythm = Regular VS @ 65 bpm.  = <1 %. Estimated battery longevity to QUIQUE = 12 years. 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 RHC yesterday showed normal filling pressures and CO/CI 4.6/2.5    Chronic systolic heart failure secondary to ICM s/p HM3 12/31/18  Stage C, NYHA Class IV  ACEi/ARB: on lisinopril- will change the dose to 7.5mg once a day  Is on 100mg of hydralazine four times a day having low flow alarms we will reduce this to three times a day and change lisinopril dosing  BB  Low dose coreg.   SCD prophylaxis ICD.  Given persistent LVAD alarms with low flows PI events with speed drops that are symptomatic with dizziness and rising SCr we will do an echo to reassess placement of canula suspect this may be against septum and we have been unable to fix this with medications. We discussed that if this is the case we would recommend RHC and admission to the hospital in case we need to apply for status 3 exeption (blood type A) for transplant. I this is not the case we will follow up with him [facility] clinic 9/4    CAD. s/p STEMI with ALYSSA to LAD 6/27/18  - Continue Crestor and BB    PAF. CHADSVASC-4.   - Coumadin per AC.   - Continue BB, amiodarone.     VT/VF. With cardiac arrest.   - No recent ICD shocks    LV thrombus.   - Coumadin as above.     CKD Stage III.        Case seen and discussed with Dr Angel Valdivia  Cardiology fellow, PGY-5        I have reviewed today's vital signs, notes, medications, labs and imaging. I have also seen and examined the patient and agree with the findings and plan as outlined above.    Jenni Ortiz MD  Section Head - Advanced Heart Failure, Transplantation and Mechanical Circulatory Support  Director - [facility]ovascular [facility]  Associate Professor of Medicine, [facility]

## 2019-06-26 ENCOUNTER — ANTICOAGULATION THERAPY VISIT (OUTPATIENT)
Dept: ANTICOAGULATION | Facility: CLINIC | Age: 59
End: 2019-06-26

## 2019-06-26 ENCOUNTER — ANCILLARY PROCEDURE (OUTPATIENT)
Dept: CARDIOLOGY | Facility: CLINIC | Age: 59
End: 2019-06-26
Attending: INTERNAL MEDICINE
Payer: COMMERCIAL

## 2019-06-26 ENCOUNTER — HOSPITAL ENCOUNTER (OUTPATIENT)
Dept: CARDIOLOGY | Facility: CLINIC | Age: 59
Discharge: HOME | End: 2019-06-26
Attending: INTERNAL MEDICINE | Admitting: INTERNAL MEDICINE
Payer: COMMERCIAL

## 2019-06-26 ENCOUNTER — TELEPHONE (OUTPATIENT)
Dept: TRANSPLANT | Facility: CLINIC | Age: 59
End: 2019-06-26

## 2019-06-26 VITALS
HEART RATE: 48 BPM | BODY MASS INDEX: 35.36 KG/M2 | WEIGHT: 207.1 LBS | SYSTOLIC BLOOD PRESSURE: 70 MMHG | OXYGEN SATURATION: 95 % | HEIGHT: 64 IN

## 2019-06-26 DIAGNOSIS — Z94.1 HEART REPLACED BY TRANSPLANT (H): ICD-10-CM

## 2019-06-26 DIAGNOSIS — Z79.01 LONG TERM (CURRENT) USE OF ANTICOAGULANTS: ICD-10-CM

## 2019-06-26 DIAGNOSIS — I50.23 ACUTE ON CHRONIC SYSTOLIC HEART FAILURE (H): ICD-10-CM

## 2019-06-26 DIAGNOSIS — I50.22 CHRONIC SYSTOLIC CONGESTIVE HEART FAILURE (H): ICD-10-CM

## 2019-06-26 DIAGNOSIS — I50.9 HEART FAILURE (H): ICD-10-CM

## 2019-06-26 DIAGNOSIS — Z95.811 LVAD (LEFT VENTRICULAR ASSIST DEVICE) PRESENT (H): ICD-10-CM

## 2019-06-26 DIAGNOSIS — I50.22 CHRONIC SYSTOLIC HEART FAILURE (H): ICD-10-CM

## 2019-06-26 DIAGNOSIS — I50.9 HF (HEART FAILURE) (H): ICD-10-CM

## 2019-06-26 DIAGNOSIS — I50.22 CHRONIC SYSTOLIC CONGESTIVE HEART FAILURE (H): Primary | ICD-10-CM

## 2019-06-26 LAB
ALBUMIN SERPL-MCNC: 3.7 G/DL (ref 3.4–5)
ALP SERPL-CCNC: 50 U/L (ref 40–150)
ALT SERPL W P-5'-P-CCNC: 32 U/L (ref 0–70)
ANION GAP SERPL CALCULATED.3IONS-SCNC: 9 MMOL/L (ref 3–14)
AST SERPL W P-5'-P-CCNC: 30 U/L (ref 0–45)
BASOPHILS # BLD AUTO: 0.1 10E9/L (ref 0–0.2)
BASOPHILS NFR BLD AUTO: 0.9 %
BILIRUB SERPL-MCNC: 1.1 MG/DL (ref 0.2–1.3)
BUN SERPL-MCNC: 29 MG/DL (ref 7–30)
CALCIUM SERPL-MCNC: 8.4 MG/DL (ref 8.5–10.1)
CHLORIDE SERPL-SCNC: 104 MMOL/L (ref 94–109)
CO2 SERPL-SCNC: 23 MMOL/L (ref 20–32)
CREAT SERPL-MCNC: 1.64 MG/DL (ref 0.66–1.25)
D DIMER PPP FEU-MCNC: 0.7 UG/ML FEU (ref 0–0.5)
DIFFERENTIAL METHOD BLD: ABNORMAL
EOSINOPHIL # BLD AUTO: 0.2 10E9/L (ref 0–0.7)
EOSINOPHIL NFR BLD AUTO: 3.9 %
ERYTHROCYTE [DISTWIDTH] IN BLOOD BY AUTOMATED COUNT: 16.9 % (ref 10–15)
FERRITIN SERPL-MCNC: 17 NG/ML (ref 26–388)
GFR SERPL CREATININE-BSD FRML MDRD: 46 ML/MIN/{1.73_M2}
GLUCOSE SERPL-MCNC: 243 MG/DL (ref 70–99)
HCT VFR BLD AUTO: 40.5 % (ref 40–53)
HGB BLD-MCNC: 12.7 G/DL (ref 13.3–17.7)
IMM GRANULOCYTES # BLD: 0 10E9/L (ref 0–0.4)
IMM GRANULOCYTES NFR BLD: 0.3 %
INR PPP: 2.58 (ref 0.86–1.14)
IRON SATN MFR SERPL: 16 % (ref 15–46)
IRON SERPL-MCNC: 58 UG/DL (ref 35–180)
LDH SERPL L TO P-CCNC: 142 U/L (ref 85–227)
LYMPHOCYTES # BLD AUTO: 0.9 10E9/L (ref 0.8–5.3)
LYMPHOCYTES NFR BLD AUTO: 15.2 %
MCH RBC QN AUTO: 27.7 PG (ref 26.5–33)
MCHC RBC AUTO-ENTMCNC: 31.4 G/DL (ref 31.5–36.5)
MCV RBC AUTO: 88 FL (ref 78–100)
MONOCYTES # BLD AUTO: 0.7 10E9/L (ref 0–1.3)
MONOCYTES NFR BLD AUTO: 12.6 %
NEUTROPHILS # BLD AUTO: 3.9 10E9/L (ref 1.6–8.3)
NEUTROPHILS NFR BLD AUTO: 67.1 %
NRBC # BLD AUTO: 0 10*3/UL
NRBC BLD AUTO-RTO: 0 /100
NT-PROBNP SERPL-MCNC: 404 PG/ML (ref 0–125)
PLATELET # BLD AUTO: 199 10E9/L (ref 150–450)
POTASSIUM SERPL-SCNC: 3.6 MMOL/L (ref 3.4–5.3)
PROT SERPL-MCNC: 6.9 G/DL (ref 6.8–8.8)
RBC # BLD AUTO: 4.58 10E12/L (ref 4.4–5.9)
SODIUM SERPL-SCNC: 136 MMOL/L (ref 133–144)
TIBC SERPL-MCNC: 370 UG/DL (ref 240–430)
TRANSFERRIN SERPL-MCNC: 51 MG/DL (ref 210–360)
TSH SERPL DL<=0.005 MIU/L-ACNC: 3.94 MU/L (ref 0.4–4)
URATE SERPL-MCNC: 5 MG/DL (ref 3.5–7.2)
VIT B12 SERPL-MCNC: 638 PG/ML (ref 193–986)
WBC # BLD AUTO: 5.9 10E9/L (ref 4–11)

## 2019-06-26 PROCEDURE — 93306 TTE W/DOPPLER COMPLETE: CPT | Mod: 26 | Performed by: INTERNAL MEDICINE

## 2019-06-26 PROCEDURE — 83540 ASSAY OF IRON: CPT | Performed by: INTERNAL MEDICINE

## 2019-06-26 PROCEDURE — 83615 LACTATE (LD) (LDH) ENZYME: CPT | Performed by: INTERNAL MEDICINE

## 2019-06-26 PROCEDURE — 84443 ASSAY THYROID STIM HORMONE: CPT | Performed by: INTERNAL MEDICINE

## 2019-06-26 PROCEDURE — 93306 TTE W/DOPPLER COMPLETE: CPT

## 2019-06-26 PROCEDURE — 85610 PROTHROMBIN TIME: CPT | Performed by: INTERNAL MEDICINE

## 2019-06-26 PROCEDURE — 80053 COMPREHEN METABOLIC PANEL: CPT | Performed by: INTERNAL MEDICINE

## 2019-06-26 PROCEDURE — G0463 HOSPITAL OUTPT CLINIC VISIT: HCPCS | Mod: ZF

## 2019-06-26 PROCEDURE — 99214 OFFICE O/P EST MOD 30 MIN: CPT | Mod: 25 | Performed by: INTERNAL MEDICINE

## 2019-06-26 PROCEDURE — 85379 FIBRIN DEGRADATION QUANT: CPT | Performed by: INTERNAL MEDICINE

## 2019-06-26 PROCEDURE — 83550 IRON BINDING TEST: CPT | Performed by: INTERNAL MEDICINE

## 2019-06-26 PROCEDURE — 83880 ASSAY OF NATRIURETIC PEPTIDE: CPT | Performed by: INTERNAL MEDICINE

## 2019-06-26 PROCEDURE — 84466 ASSAY OF TRANSFERRIN: CPT | Performed by: INTERNAL MEDICINE

## 2019-06-26 PROCEDURE — 85025 COMPLETE CBC W/AUTO DIFF WBC: CPT | Performed by: INTERNAL MEDICINE

## 2019-06-26 PROCEDURE — 82607 VITAMIN B-12: CPT | Performed by: INTERNAL MEDICINE

## 2019-06-26 PROCEDURE — 93750 INTERROGATION VAD IN PERSON: CPT | Performed by: INTERNAL MEDICINE

## 2019-06-26 PROCEDURE — 82728 ASSAY OF FERRITIN: CPT | Performed by: INTERNAL MEDICINE

## 2019-06-26 PROCEDURE — 36415 COLL VENOUS BLD VENIPUNCTURE: CPT | Performed by: INTERNAL MEDICINE

## 2019-06-26 PROCEDURE — 84550 ASSAY OF BLOOD/URIC ACID: CPT | Performed by: INTERNAL MEDICINE

## 2019-06-26 RX ORDER — HYDRALAZINE HYDROCHLORIDE 50 MG/1
100 TABLET, FILM COATED ORAL 3 TIMES DAILY
Qty: 90 TABLET | Refills: 3 | Status: SHIPPED | OUTPATIENT
Start: 2019-06-26 | End: 2019-09-01

## 2019-06-26 RX ORDER — LISINOPRIL 2.5 MG/1
7.5 TABLET ORAL DAILY
Qty: 90 TABLET | Refills: 11 | Status: ON HOLD | OUTPATIENT
Start: 2019-06-26 | End: 2019-07-08

## 2019-06-26 ASSESSMENT — PAIN SCALES - GENERAL: PAINLEVEL: NO PAIN (0)

## 2019-06-26 ASSESSMENT — MIFFLIN-ST. JEOR: SCORE: 1680.4

## 2019-06-26 NOTE — LETTER
6/26/2019      RE: Mariusz Sharp  [address] [facility] [address]       Dear Colleague,    Thank you for the opportunity to participate in the care of your patient, Mariusz Sharp, at the [facility] HEART [facility]. Please see a copy of my visit note below.    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    His LVAD postoperative course was complicated by mild RV dysfunction  Requiring dobutamine but he was successfully  discharged to TCU. He had a mechanical fall with CTH negative for bleed and has been seen at core clinic with low flow alarms that are asymptomatic thought to be related to afterload and volume status. CT was done there is no problem with the LVAD positioning. We last saw him in February  [facility] the day before clinic had showed normal Right and L sided filling pressures CO/CI 3.7/2.0. He states the alarms are not as often as they used to happen we changed his lisinopril to the pm and stopped imdur given dizziness. He has then had elevated MAP's with titration of medications complicated by recurrent dizziness. The dizziness has not changed much happens more in the am he has some dizziness a couple hrs after he takes his medications.     No fevers no driveline purulence no bleeding LVAD interrogation at 5300RPM hematocrit at 20 because of the low flow alarms he is still having the alarms but not as frequent as he used to maybe twice a day he has a lot of pi events with speed drops and associated dizziness. His weight is going up 207lbs last visit 190. ICD check today no events    Current cardiac meds  Amiodarone 200mg d  Asa  Carvedilol 12.5mg BID  Digoxin 125 mcg  Hydralazine 100mg QID  Lisinopril 2.5mg  BID  Warfarin   Rosuvastatin 20mg qhs    PAST MEDICAL HISTORY:  Past Medical History:   Diagnosis Date     Acute kidney injury (H)      CKD (chronic kidney disease)      Coronary artery disease      Diabetes mellitus (H)      HTN (hypertension)      Hyperlipidemia      Ischemic cardiomyopathy      LV (left ventricular) mural thrombus      Paroxysmal atrial flutter (H)      RVF (right ventricular failure) (H)      Systolic heart failure (H)      Ventricular tachycardia (H)        FAMILY HISTORY:  Reviewed    SOCIAL HISTORY:  Social History     Social History     Marital status: Single     Spouse name: N/A     Number of children: N/A     Years of education: N/A     Social History Main Topics     Smoking status: Never Smoker     Smokeless tobacco: Never Used     Alcohol use Not on file     Drug use: Not on file     Sexual activity: Not on file     Other Topics Concern     Not on file     Social History Narrative       CURRENT MEDICATIONS:  Outpatient Medications Prior to Visit   Medication Sig Dispense Refill     amiodarone (PACERONE/CODARONE) 200 MG tablet Take 1 tablet (200 mg) by mouth daily 90 tablet 3     aspirin (ASA) 81 MG chewable tablet Take 1 tablet (81 mg) by mouth daily 90 tablet 3     carvedilol (COREG) 12.5 MG tablet Take 1 tablet (12.5 mg) by mouth 2 times daily (with meals) 60 tablet 3     digoxin (LANOXIN) 125 MCG tablet Take 1 tablet (125 mcg) by mouth daily 90 tablet 3     famotidine (PEPCID) 20 MG tablet Take 1 tablet (20 mg) by mouth 2 times daily 180 tablet 3     hydrALAZINE (APRESOLINE) 50 MG tablet Take 2 tablets (100 mg) by mouth 4 times daily 120 tablet 3     insulin aspart (NOVOLOG PEN) 100 UNIT/ML pen Prandial Dosin units per 10 grams of carbohydrate each Meal or Snack.  Only chart total amount of units given.  Do not give if pre-prandial glucose is less than 60 mg/dL. If given at mealtime, administer within 30 minutes of start of meal.       insulin glargine (LANTUS SOLOSTAR PEN) 100  UNIT/ML pen Inject 20 Units Subcutaneous At Bedtime       lisinopril (PRINIVIL/ZESTRIL) 2.5 MG tablet Take 2 tablets (5 mg) by mouth 2 times daily 120 tablet 11     rosuvastatin (CRESTOR) 20 MG tablet Take 1 tablet (20 mg) by mouth At Bedtime 90 tablet 3     warfarin (COUMADIN) 2.5 MG tablet Take two to three tablets daily or as directed by the Coumadin clinic 150 tablet 5     insulin aspart (NOVOLOG PEN) 100 UNIT/ML pen Inject 1-10 Units Subcutaneous 3 times daily (before meals) Correction Scale - HIGH INSULIN RESISTANCE DOSING     -164 =1 units.   -189 =2.   -214 =3.   -239 =4.   -264 =5.   -289 =6.   -314 =7.   -339 =8.   -364 =9.  BG greater than or equal to 365 give 10 units  To be given with meal insulin, based on pre-meal BG 9 mL 0     insulin aspart (NOVOLOG PEN) 100 UNIT/ML pen Inject 1-7 Units Subcutaneous At Bedtime HIGH INSULIN RESISTANCE DOSING    Bedtime  For  - 224 give 1 units.   For  - 249 give 2 units.   For  - 274 give 3 units.   For  - 299 give 4 units.   For  - 324 give 5 units.   For  - 349 give 6 units.   For BG greater than or equal to 350 give 7 units. (Patient not taking: Reported on 2/11/2019) 2.1 mL 0     insulin glargine (LANTUS SOLOSTAR PEN) 100 UNIT/ML pen Inject 20 Units Subcutaneous At Bedtime 6 mL 0     nitroGLYcerin (NITROSTAT) 0.4 MG sublingual tablet Place 1 tablet (0.4 mg) under the tongue every 5 minutes as needed for chest pain For chest pain place 1 tablet under the tongue every 5 minutes for 3 doses. If symptoms persist 5 minutes after 1st dose call 911. 10 tablet 0     Facility-Administered Medications Prior to Visit   Medication Dose Route Frequency Provider Last Rate Last Dose     lidocaine (XYLOCAINE) 2 % external gel   Urethral Once Megan Ibarra MD           ROS:   CONSTITUTIONAL: Denies fever, chills, fatigue, or weight fluctuations.   HEENT: Denies headache and  "changes in speech. He complains of vision changes.   CV: Refer to HPI.   PULMONARY:Denies shortness of breath, cough, or previous TB exposure.   GI:Denies nausea, vomiting, diarrhea, and abdominal pain. Bowel movements are regular.   :See HPI  EXT:Denies lower extremity edema.   SKIN:Denies abnormal rashes or lesions.   MUSCULOSKELETAL:Denies upper or lower extremity weakness and pain.   NEUROLOGIC:Denies lightheadedness, dizziness, seizures, or upper or lower extremity paresthesia.     EXAM:  BP (!) 70/0 (BP Location: Right arm, Patient Position: Chair, Cuff Size: Adult Large)   Pulse (!) 48   Ht 1.626 m (5' 4\")   Wt 93.9 kg (207 lb 1.6 oz)   SpO2 95%   BMI 35.55 kg/m     GENERAL: Appears alert and oriented times three. But is somewhat dizzy  NECK: Supple and without lymphadenopathy. JVD ~6-7cm.  CV: LVAD Humm  RESPIRATORY: Respirations regular, even, and unlabored. Lungs CTA throughout.   GI: Soft and non distended with normoactive bowel sounds present in all quadrants. No tenderness, rebound, guarding. No organomegaly.    EXTREMITIES: No peripheral edema. .   NEUROLOGIC: Alert and orientated x 3. CN II-XII grossly intact. No focal deficits.   MUSCULOSKELETAL: No joint swelling or tenderness.   SKIN: No jaundice. No rashes or lesions.     Labs:      Diagnostics:  Recent Results (from the past 4320 hour(s))   ECHO COMPLETE WITH OPTISON    Narrative    [id number]  [id number]  [id number]  [id number]^STEFANIA^MANNY^V           [facility]  Echocardiography Laboratory  [address] [place] [id number]     Name: ADILSON RINCON  MRN: [id number]  : 1960  Study Date: 2018 11:27 AM  Age: 57 yrs  Gender: Male  [place] [facility] [place] For Study: Cardiac Arrest  Ordering Physician: MANNY AGUIAR  Referring Physician: SELF, REFERRED  Performed By: Laya Read RDCS     BSA: 1.8 m2  Height: 64 in  Weight: 170 lb  HR: 78  BP: 99/76 " mmHg  _____________________________________________________________________________  __        Procedure  Echocardiogram with two-dimensional, color and spectral Doppler performed.  Contrast Optison. Optison (NDC #[id number]) given intravenously. Patient was  given 5 ml mixture of 3 ml Optison and 6 ml saline. 4 ml wasted.  _____________________________________________________________________________  __        Interpretation Summary  Left ventricular wall thickness is normal. Borderline left ventricular  dilation is present. LVIDd is 5.19cm The Ejection Fraction is estimated at 20-  25%. The mid to distal anteroseptum, mid to distal anterior wall, mid to  distal anterolateral wall and mid to apical septum and apex are akinetic. This  is consistent with large LAD territory infarct. Laminar thrombus is seen in  the LV apex  The right ventricle is normal size. Difficult to evaluate RV function but it  appears to be mild to moderately reduced.  Moderate tricuspid insufficiency is present. Right ventricular systolic  pressure is 26mmHg above the right atrial pressure.  Dilation of the inferior vena cava is present with normal respiratory  variation in diameter. Estimated mean right atrial pressure is 8 mmHg (mildly  elevated).  No pericardial effusion is present.     Previous study not available for comparison.  _____________________________________________________________________________  __        Left Ventricle  Left ventricular wall thickness is normal. Borderline left ventricular  dilation is present. LVIDd is 5.19cm. Grade III or advanced diastolic  dysfunction. The Ejection Fraction is estimated at 20-25%. The mid to distal  anteroseptum, mid to distal anterior  wall, mid to distal anterolateral wall and mid to apical septum and apex are  akinetic. This is consistent with large LAD territory infarct. Laminar  thrombus is seen in the LV apex.     Right Ventricle  Right ventricular wall thickness is normal. The  right ventricle is normal  size. Difficult to evaluate RV function but it appears to be mild to  moderately reduced. A pacemaker lead is noted in the right ventricle.     Atria  Moderate left atrial enlargement is present. Moderate right atrial enlargement  is present. A pacemaker lead is noted in the right atrium.     Mitral Valve  The mitral valve is normal. Trace mitral insufficiency is present.        Aortic Valve  Aortic valve is normal in structure and function. The aortic valve is  tricuspid.     Tricuspid Valve  The tricuspid valve is normal. Moderate tricuspid insufficiency is present.  The right ventricular systolic pressure is approximated at 26.0 mmHg plus the  right atrial pressure. Right ventricular systolic pressure is 26mmHg above the  right atrial pressure.     Pulmonic Valve  The pulmonic valve is normal. Trace pulmonic insufficiency is present.     Vessels  The aorta root is normal. Dilation of the inferior vena cava is present with  normal respiratory variation in diameter. Estimated mean right atrial pressure  is 8 mmHg (mildly elevated).     Pericardium  No pericardial effusion is present.        Compared to Previous Study  Previous study not available for comparison.  _____________________________________________________________________________  __  MMode/2D Measurements & Calculations  IVSd: 0.74 cm     LVIDd: 5.2 cm  LVIDs: 4.0 cm  LVPWd: 1.0 cm  FS: 23.3 %  LV mass(C)d: 167.6 grams  LV mass(C)dI: 91.8 grams/m2  asc Aorta Diam: 3.6 cm  LVOT diam: 2.1 cm  LVOT area: 3.5 cm2  LA Volume (BP): 81.1 ml  LA Volume Index (BP): 44.3 ml/m2  RWT: 0.40           Doppler Measurements & Calculations  MV E max sasha: 101.0 cm/sec  MV A max sasha: 16.5 cm/sec  MV E/A: 6.1  MV dec slope: 660.0 cm/sec2  PA V2 max: 96.5 cm/sec  PA max PG: 3.7 mmHg  PA acc time: 0.11 sec  TR max sasha: 255.0 cm/sec  TR max P.0 mmHg  E/E' av.8  Lateral E/e': 11.0  Medial E/e': 34.5      _____________________________________________________________________________  __           Report approved by: LEÓN Powell 07/22/2018 02:05 PM                     RHC 2/19/18    Right Heart Catheterization:  /95/112  HR 61  BSA 1.91     RA 17/13/11   RV 30/11  PA 30/12/18   PCW 15/13/12   William CO 3.7   William CI 2.0   TD CO 4.6   TD CI 2.5   PA sat 51.8%  PCW sat 95.6%   Hgb 9.1 g/dL   PVR 1.6  SVR 4.8      Device check 2/11Patient has an appointment to see RADHA Garcia today. Normal ICD function. 1 episodes recorded as NSVT, available EGM shows irregular R-R with rates of  bpm, with this being a single chamber device it makes it difficult to determine if the rhythm is NSVT or Afib with RVR. Pt reports being on Coumadin.  Intrinsic rhythm = Regular VS @ 65 bpm.  = <1 %. Estimated battery longevity to QUIQUE = 12 years. 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 RHC yesterday showed normal filling pressures and CO/CI 4.6/2.5    Chronic systolic heart failure secondary to ICM s/p HM3 12/31/18  Stage C, NYHA Class IV  ACEi/ARB: on lisinopril- will change the dose to 7.5mg once a day  Is on 100mg of hydralazine four times a day having low flow alarms we will reduce this to three times a day and change lisinopril dosing  BB  Low dose coreg.   SCD prophylaxis ICD.  Given persistent LVAD alarms with low flows PI events with speed drops that are symptomatic with dizziness and rising SCr we will do an echo to reassess placement of canula suspect this may be against septum and we have been unable to fix this with medications. We discussed that if this is the case we would recommend RHC and admission to the hospital in case we need to apply for status 3  exeption (blood type A) for transplant. I this is not the case we will follow up with him CORE clinic 9/4    CAD. s/p STEMI with ALYSSA to LAD 6/27/18  - Continue Crestor and BB    PAF. CHADSVASC-4.   - Coumadin per AC.   - Continue BB, amiodarone.     VT/VF. With cardiac arrest.   - No recent ICD shocks    LV thrombus.   - Coumadin as above.     CKD Stage III.       Case seen and discussed with Dr Angel Tripathi [facility]  Cardiology fellow, PGY-5        I have reviewed today's vital signs, notes, medications, labs and imaging. I have also seen and examined the patient and agree with the findings and plan as outlined above.    Jenni Ortiz MD  Section Head - Advanced Heart Failure, Transplantation and Mechanical Circulatory Support  Director - Adult Congenital and Cardiovascular Genetics Center  Associate Professor of Medicine, [facility]

## 2019-06-26 NOTE — TELEPHONE ENCOUNTER
Patient Update 06/26/19:    Spoke to patient today and discussed the following information:    Reviewed plan with VAD coordinator, patient will be coming in for RHC in the next week or 2 and may possibly need an upgrade on the transplant list due to complications with the VAD.  Discussed that this may require hospitalization and patient understood that.  Answered patient's questions, no further questions at this time.  Patient verbalized understanding of the plan and agrees to call Transplant Coordinator with any further questions or concerns.

## 2019-06-26 NOTE — NURSING NOTE
1). PUMP DATA  Primary controller serial number: [id number]    HM 3:   Flow: 4.3 L/min,    Speed: 5300 RPMs,     PI: 4.3 ,  Power: 3.9 Parker, Hct: 20     Primary controller   Back up battery: Patient use: 14, Replace in: 22  Months     Data downloaded: No   Equipment and driveline assessed for damage: Yes     Back up : Serial number: [id number]  Back up battery: Patient use: 7 Replace in: 22  Months  Programmed settings identical to the settings on the primary controller :NA      Education complete: Yes   Charge the BACKUP controller s backup battery every 6 months  Perform a self test on BACKUP every 6 months  Change the MPU s batteries every 6 months:Yes        2). ALARMS  Alarms reported by patient since last pump evaluation: Yes  Pt reports 1-2 low flow alarm daily.  Alarms or other finding noted during pump data history and alarm download: Frequent PI events with occasional speed drops.  The 240 events recorded are just from today from 0640 on.    Action Taken:  Reviewed data with patient: Yes      3). DRESSING CHANGE / DRIVELINE ASSESSMENT  Dressing change completed today: No  Appearance of Driveline site: Per pt C, D,I    Driveline stabilization: Method: [employer]  [ Teaching reinforced on need for stabilization of Driveline. ]    Education provided on using shower bag.  Reinforced to patient that he should only shower on battery, Keep controller in bag and have strap attached to him.  He should always cover driveline dressing and if it appears damp at all, change his DLES dressing after his shower.

## 2019-06-26 NOTE — PROGRESS NOTES
Addendum 7/3/19  Message is left for patient today.  Patient is scheduled to come in on  for a RHC.  Patient will then be admitted. [facility]        ANTICOAGULATION FOLLOW-UP CLINIC VISIT    Patient Name:  Mariusz Sharp  Date:  2019  Contact Type:  Telephone    SUBJECTIVE:         OBJECTIVE    INR   Date Value Ref Range Status   2019 2.58 (H) 0.86 - 1.14 Final     Chromogenic Factor 10   Date Value Ref Range Status   2019 72 70 - 130 % Final     Comment:     Therapeutic Range:  A Chromogenic Factor 10 level of approximately 20-40%   inversely correlates with an INR of 2-3 for patients receiving Warfarin.   Chromogenic Factor 10 levels below 20% indicate an INR greater than 3 and   levels above 40% indicate an INR less than 2.         ASSESSMENT / PLAN  INR assessment THER    Recheck INR In: 1 WEEK    INR Location Clinic      Anticoagulation Summary  As of 2019    INR goal:   2.0-3.0   TTR:   75.5 % (5.2 mo)   INR used for dosin.58 (2019)   Warfarin maintenance plan:   7.5 mg (5 mg x 1.5) every Mon, Wed, Fri; 5 mg (5 mg x 1) all other days   Full warfarin instructions:   7.5 mg every Mon, Wed, Fri; 5 mg all other days   Weekly warfarin total:   42.5 mg   Plan last modified:   Sophy Griggs RN (2019)   Next INR check:   7/3/2019   Priority:   INR   Target end date:   Indefinite    Indications    Heart failure (H) [I50.9]  LVAD (left ventricular assist device) present (H) [Z95.811]             Anticoagulation Episode Summary     INR check location:       Preferred lab:       Send INR reminders to:   [facility]    Comments:   LVAD implanted 18  ASA 81mg Daily Has 2.5mg tablets   [place] lab obi=[phone].  Faxed S.O there on 2019        Anticoagulation Care Providers     Provider Role Specialty Phone number    Jenni Ortiz MD [facility] Cardiology [phone]            See the Encounter Report to view Anticoagulation Flowsheet and Dosing Calendar  (Go to Encounters tab in chart review, and find the Anticoagulation Therapy Visit)  Spoke with patient.  Patient had LVAD placed on:   12/31/2018  Patient's current Aspirin dose: 81mg  LVAD Protocol followed: yes   Nasreen Kathleen RN

## 2019-06-26 NOTE — PATIENT INSTRUCTIONS
Medications:  1. Take hydralazine 100mg three times a day.  8am, 2pm and 9pm  2.  Take your coreg 2 times a day with your other meds.  3.  Increase your lisinopril to 7.5mg and take once day at 2pm.    Follow-up:  1. We will call you with the results of the echo today.  Then decide if we are going to do a right heart catheterization and admit you to the hospital.  2.  If you are not admitted.  You have a follow up appointment with Pippa on 9/4.        Page the VAD Coordinator on call if you gain more than 3 lb in a day or 5 in a week. Please also page if you feel unwell or have alarms.     Great to see you in clinic today. To Page the VAD Coordinator on call, dial [phone] option #4 and ask to speak to the VAD coordinator on call.

## 2019-06-26 NOTE — PATIENT INSTRUCTIONS
It was a pleasure to see you in clinic today. Please do not hesitate to call with any questions or concerns. We look forward to seeing you in clinic at your next device check 9/4/19.    Divina Bryant RN  Electrophysiology Nurse Clinician  [facility]  During business hours call:  [phone]  After business hours please call: [phone]- select option #4 and ask for job code [phone].

## 2019-06-27 LAB
MDC_IDC_LEAD_IMPLANT_DT: NORMAL
MDC_IDC_LEAD_IMPLANT_DT: NORMAL
MDC_IDC_LEAD_LOCATION: NORMAL
MDC_IDC_LEAD_LOCATION: NORMAL
MDC_IDC_LEAD_LOCATION_DETAIL_1: NORMAL
MDC_IDC_LEAD_LOCATION_DETAIL_1: NORMAL
MDC_IDC_LEAD_MFG: NORMAL
MDC_IDC_LEAD_MFG: NORMAL
MDC_IDC_LEAD_MODEL: NORMAL
MDC_IDC_LEAD_MODEL: NORMAL
MDC_IDC_LEAD_POLARITY_TYPE: NORMAL
MDC_IDC_LEAD_POLARITY_TYPE: NORMAL
MDC_IDC_LEAD_SERIAL: NORMAL
MDC_IDC_LEAD_SERIAL: NORMAL
MDC_IDC_LEAD_SPECIAL_FUNCTION: NORMAL
MDC_IDC_LEAD_SPECIAL_FUNCTION: NORMAL
MDC_IDC_MSMT_BATTERY_DTM: NORMAL
MDC_IDC_MSMT_BATTERY_REMAINING_LONGEVITY: 144 MO
MDC_IDC_MSMT_BATTERY_STATUS: NORMAL
MDC_IDC_MSMT_CAP_CHARGE_DTM: NORMAL
MDC_IDC_MSMT_CAP_CHARGE_ENERGY: 41 J
MDC_IDC_MSMT_CAP_CHARGE_TIME: 8.39
MDC_IDC_MSMT_CAP_CHARGE_TIME: 9.28
MDC_IDC_MSMT_CAP_CHARGE_TYPE: NORMAL
MDC_IDC_MSMT_CAP_CHARGE_TYPE: NORMAL
MDC_IDC_MSMT_LEADCHNL_RV_IMPEDANCE_VALUE: 1005 OHM
MDC_IDC_MSMT_LEADCHNL_RV_PACING_THRESHOLD_AMPLITUDE: 1 V
MDC_IDC_MSMT_LEADCHNL_RV_PACING_THRESHOLD_PULSEWIDTH: 1 MS
MDC_IDC_MSMT_LEADCHNL_RV_SENSING_INTR_AMPL: 7.2 MV
MDC_IDC_PG_IMPLANT_DTM: NORMAL
MDC_IDC_PG_MFG: NORMAL
MDC_IDC_PG_MODEL: NORMAL
MDC_IDC_PG_SERIAL: NORMAL
MDC_IDC_PG_TYPE: NORMAL
MDC_IDC_SESS_CLINIC_NAME: NORMAL
MDC_IDC_SESS_DTM: NORMAL
MDC_IDC_SESS_TYPE: NORMAL
MDC_IDC_SET_BRADY_HYSTRATE: NORMAL
MDC_IDC_SET_BRADY_LOWRATE: 40 {BEATS}/MIN
MDC_IDC_SET_BRADY_MODE: NORMAL
MDC_IDC_SET_LEADCHNL_RV_PACING_AMPLITUDE: 2.2 V
MDC_IDC_SET_LEADCHNL_RV_PACING_ANODE_ELECTRODE_1: NORMAL
MDC_IDC_SET_LEADCHNL_RV_PACING_ANODE_LOCATION_1: NORMAL
MDC_IDC_SET_LEADCHNL_RV_PACING_CAPTURE_MODE: NORMAL
MDC_IDC_SET_LEADCHNL_RV_PACING_CATHODE_ELECTRODE_1: NORMAL
MDC_IDC_SET_LEADCHNL_RV_PACING_CATHODE_LOCATION_1: NORMAL
MDC_IDC_SET_LEADCHNL_RV_PACING_POLARITY: NORMAL
MDC_IDC_SET_LEADCHNL_RV_PACING_PULSEWIDTH: 1 MS
MDC_IDC_SET_LEADCHNL_RV_SENSING_ADAPTATION_MODE: NORMAL
MDC_IDC_SET_LEADCHNL_RV_SENSING_ANODE_ELECTRODE_1: NORMAL
MDC_IDC_SET_LEADCHNL_RV_SENSING_ANODE_LOCATION_1: NORMAL
MDC_IDC_SET_LEADCHNL_RV_SENSING_CATHODE_ELECTRODE_1: NORMAL
MDC_IDC_SET_LEADCHNL_RV_SENSING_CATHODE_LOCATION_1: NORMAL
MDC_IDC_SET_LEADCHNL_RV_SENSING_POLARITY: NORMAL
MDC_IDC_SET_LEADCHNL_RV_SENSING_SENSITIVITY: 0.6 MV
MDC_IDC_SET_ZONE_DETECTION_INTERVAL: 300 MS
MDC_IDC_SET_ZONE_DETECTION_INTERVAL: 375 MS
MDC_IDC_SET_ZONE_TYPE: NORMAL
MDC_IDC_SET_ZONE_VENDOR_TYPE: NORMAL
MDC_IDC_STAT_BRADY_RV_PERCENT_PACED: 1 %
MDC_IDC_STAT_EPISODE_RECENT_COUNT: 0
MDC_IDC_STAT_EPISODE_RECENT_COUNT_DTM_END: NORMAL
MDC_IDC_STAT_EPISODE_RECENT_COUNT_DTM_START: NORMAL
MDC_IDC_STAT_EPISODE_TOTAL_COUNT: 0
MDC_IDC_STAT_EPISODE_TOTAL_COUNT: 49
MDC_IDC_STAT_EPISODE_TOTAL_COUNT: 6
MDC_IDC_STAT_EPISODE_TOTAL_COUNT_DTM_END: NORMAL
MDC_IDC_STAT_EPISODE_TYPE: NORMAL
MDC_IDC_STAT_EPISODE_VENDOR_TYPE: NORMAL
MDC_IDC_STAT_TACHYTHERAPY_ATP_DELIVERED_RECENT: 0
MDC_IDC_STAT_TACHYTHERAPY_ATP_DELIVERED_TOTAL: 9
MDC_IDC_STAT_TACHYTHERAPY_RECENT_DTM_END: NORMAL
MDC_IDC_STAT_TACHYTHERAPY_RECENT_DTM_START: NORMAL
MDC_IDC_STAT_TACHYTHERAPY_SHOCKS_ABORTED_RECENT: 0
MDC_IDC_STAT_TACHYTHERAPY_SHOCKS_ABORTED_TOTAL: 0
MDC_IDC_STAT_TACHYTHERAPY_SHOCKS_DELIVERED_RECENT: 0
MDC_IDC_STAT_TACHYTHERAPY_SHOCKS_DELIVERED_TOTAL: 5
MDC_IDC_STAT_TACHYTHERAPY_TOTAL_DTM_END: NORMAL

## 2019-06-28 ENCOUNTER — CARE COORDINATION (OUTPATIENT)
Dept: CARDIOLOGY | Facility: CLINIC | Age: 59
End: 2019-06-28

## 2019-06-28 DIAGNOSIS — I50.22 CHRONIC SYSTOLIC CONGESTIVE HEART FAILURE (H): Primary | ICD-10-CM

## 2019-07-03 ENCOUNTER — CARE COORDINATION (OUTPATIENT)
Dept: CARDIOLOGY | Facility: CLINIC | Age: 59
End: 2019-07-03

## 2019-07-03 NOTE — PROGRESS NOTES
Called patient/caregiver to check in 24 weeks post discharge. Patient did not answer. Left VM.  Encouraged patient to call VAD Coordinator on-call with any questions or concerns. Patient will come to hospital on Friday for a RHC and then will be admitted to .

## 2019-07-05 ENCOUNTER — APPOINTMENT (OUTPATIENT)
Dept: MEDSURG UNIT | Facility: CLINIC | Age: 59
End: 2019-07-05
Attending: INTERNAL MEDICINE
Payer: COMMERCIAL

## 2019-07-05 ENCOUNTER — HOSPITAL ENCOUNTER (INPATIENT)
Facility: CLINIC | Age: 59
LOS: 3 days | Discharge: HOME | End: 2019-07-08
Attending: INTERNAL MEDICINE | Admitting: INTERNAL MEDICINE
Payer: COMMERCIAL

## 2019-07-05 ENCOUNTER — APPOINTMENT (OUTPATIENT)
Dept: LAB | Facility: CLINIC | Age: 59
End: 2019-07-05
Attending: INTERNAL MEDICINE
Payer: COMMERCIAL

## 2019-07-05 DIAGNOSIS — Z79.4 TYPE 2 DIABETES MELLITUS WITH HYPERGLYCEMIA, WITH LONG-TERM CURRENT USE OF INSULIN (H): Primary | ICD-10-CM

## 2019-07-05 DIAGNOSIS — I50.23 ACUTE ON CHRONIC SYSTOLIC HEART FAILURE (H): ICD-10-CM

## 2019-07-05 DIAGNOSIS — Z95.811 LVAD (LEFT VENTRICULAR ASSIST DEVICE) PRESENT (H): ICD-10-CM

## 2019-07-05 DIAGNOSIS — E11.65 TYPE 2 DIABETES MELLITUS WITH HYPERGLYCEMIA, WITH LONG-TERM CURRENT USE OF INSULIN (H): Primary | ICD-10-CM

## 2019-07-05 DIAGNOSIS — I50.22 CHRONIC SYSTOLIC HEART FAILURE (H): ICD-10-CM

## 2019-07-05 DIAGNOSIS — I50.22 CHRONIC SYSTOLIC CONGESTIVE HEART FAILURE (H): ICD-10-CM

## 2019-07-05 LAB
ALBUMIN SERPL-MCNC: 3.5 G/DL (ref 3.4–5)
ALP SERPL-CCNC: 43 U/L (ref 40–150)
ALT SERPL W P-5'-P-CCNC: 33 U/L (ref 0–70)
ANION GAP SERPL CALCULATED.3IONS-SCNC: 5 MMOL/L (ref 3–14)
ANION GAP SERPL CALCULATED.3IONS-SCNC: 7 MMOL/L (ref 3–14)
AST SERPL W P-5'-P-CCNC: 29 U/L (ref 0–45)
BASE DEFICIT BLDV-SCNC: 0.6 MMOL/L
BASOPHILS # BLD AUTO: 0.1 10E9/L (ref 0–0.2)
BASOPHILS NFR BLD AUTO: 1.4 %
BILIRUB SERPL-MCNC: 0.8 MG/DL (ref 0.2–1.3)
BUN SERPL-MCNC: 32 MG/DL (ref 7–30)
BUN SERPL-MCNC: 37 MG/DL (ref 7–30)
CALCIUM SERPL-MCNC: 8.3 MG/DL (ref 8.5–10.1)
CALCIUM SERPL-MCNC: 8.6 MG/DL (ref 8.5–10.1)
CHLORIDE SERPL-SCNC: 108 MMOL/L (ref 94–109)
CHLORIDE SERPL-SCNC: 110 MMOL/L (ref 94–109)
CO2 SERPL-SCNC: 23 MMOL/L (ref 20–32)
CO2 SERPL-SCNC: 24 MMOL/L (ref 20–32)
CREAT SERPL-MCNC: 1.55 MG/DL (ref 0.66–1.25)
CREAT SERPL-MCNC: 1.63 MG/DL (ref 0.66–1.25)
DIFFERENTIAL METHOD BLD: ABNORMAL
EOSINOPHIL # BLD AUTO: 0.2 10E9/L (ref 0–0.7)
EOSINOPHIL NFR BLD AUTO: 3.9 %
ERYTHROCYTE [DISTWIDTH] IN BLOOD BY AUTOMATED COUNT: 16.7 % (ref 10–15)
ERYTHROCYTE [DISTWIDTH] IN BLOOD BY AUTOMATED COUNT: 16.8 % (ref 10–15)
GFR SERPL CREATININE-BSD FRML MDRD: 46 ML/MIN/{1.73_M2}
GFR SERPL CREATININE-BSD FRML MDRD: 49 ML/MIN/{1.73_M2}
GLUCOSE BLDC GLUCOMTR-MCNC: 192 MG/DL (ref 70–99)
GLUCOSE SERPL-MCNC: 200 MG/DL (ref 70–99)
GLUCOSE SERPL-MCNC: 209 MG/DL (ref 70–99)
HBA1C MFR BLD: 9.5 % (ref 0–5.6)
HCO3 BLDV-SCNC: 25 MMOL/L (ref 21–28)
HCT VFR BLD AUTO: 39.1 % (ref 40–53)
HCT VFR BLD AUTO: 39.8 % (ref 40–53)
HGB BLD-MCNC: 12.2 G/DL (ref 13.3–17.7)
HGB BLD-MCNC: 12.2 G/DL (ref 13.3–17.7)
IMM GRANULOCYTES # BLD: 0 10E9/L (ref 0–0.4)
IMM GRANULOCYTES NFR BLD: 0.2 %
INR PPP: 3.1 (ref 0.86–1.14)
INR PPP: 3.14 (ref 0.86–1.14)
LDH SERPL L TO P-CCNC: 158 U/L (ref 85–227)
LYMPHOCYTES # BLD AUTO: 1.1 10E9/L (ref 0.8–5.3)
LYMPHOCYTES NFR BLD AUTO: 19 %
MAGNESIUM SERPL-MCNC: 2 MG/DL (ref 1.6–2.3)
MCH RBC QN AUTO: 27.5 PG (ref 26.5–33)
MCH RBC QN AUTO: 27.5 PG (ref 26.5–33)
MCHC RBC AUTO-ENTMCNC: 30.7 G/DL (ref 31.5–36.5)
MCHC RBC AUTO-ENTMCNC: 31.2 G/DL (ref 31.5–36.5)
MCV RBC AUTO: 88 FL (ref 78–100)
MCV RBC AUTO: 90 FL (ref 78–100)
MONOCYTES # BLD AUTO: 0.8 10E9/L (ref 0–1.3)
MONOCYTES NFR BLD AUTO: 14.2 %
NEUTROPHILS # BLD AUTO: 3.5 10E9/L (ref 1.6–8.3)
NEUTROPHILS NFR BLD AUTO: 61.3 %
NRBC # BLD AUTO: 0 10*3/UL
NRBC BLD AUTO-RTO: 0 /100
O2/TOTAL GAS SETTING VFR VENT: 21 %
OXYHGB MFR BLDV: 61 %
PCO2 BLDV: 41 MM HG (ref 40–50)
PH BLDV: 7.38 PH (ref 7.32–7.43)
PLATELET # BLD AUTO: 199 10E9/L (ref 150–450)
PLATELET # BLD AUTO: 211 10E9/L (ref 150–450)
PO2 BLDV: 33 MM HG (ref 25–47)
POTASSIUM SERPL-SCNC: 4.1 MMOL/L (ref 3.4–5.3)
POTASSIUM SERPL-SCNC: 4.1 MMOL/L (ref 3.4–5.3)
PROT SERPL-MCNC: 6.7 G/DL (ref 6.8–8.8)
RBC # BLD AUTO: 4.43 10E12/L (ref 4.4–5.9)
RBC # BLD AUTO: 4.44 10E12/L (ref 4.4–5.9)
SODIUM SERPL-SCNC: 138 MMOL/L (ref 133–144)
SODIUM SERPL-SCNC: 140 MMOL/L (ref 133–144)
WBC # BLD AUTO: 5.6 10E9/L (ref 4–11)
WBC # BLD AUTO: 6.4 10E9/L (ref 4–11)

## 2019-07-05 PROCEDURE — 85610 PROTHROMBIN TIME: CPT | Performed by: PHYSICIAN ASSISTANT

## 2019-07-05 PROCEDURE — 85025 COMPLETE CBC W/AUTO DIFF WBC: CPT | Performed by: INTERNAL MEDICINE

## 2019-07-05 PROCEDURE — 20000004 ZZH R&B ICU UMMC

## 2019-07-05 PROCEDURE — 27210794 ZZH OR GENERAL SUPPLY STERILE: Performed by: INTERNAL MEDICINE

## 2019-07-05 PROCEDURE — 36415 COLL VENOUS BLD VENIPUNCTURE: CPT | Performed by: PHYSICIAN ASSISTANT

## 2019-07-05 PROCEDURE — C1894 INTRO/SHEATH, NON-LASER: HCPCS | Performed by: INTERNAL MEDICINE

## 2019-07-05 PROCEDURE — 85027 COMPLETE CBC AUTOMATED: CPT | Performed by: PHYSICIAN ASSISTANT

## 2019-07-05 PROCEDURE — 40000275 ZZH STATISTIC RCP TIME EA 10 MIN

## 2019-07-05 PROCEDURE — 4A023N6 MEASUREMENT OF CARDIAC SAMPLING AND PRESSURE, RIGHT HEART, PERCUTANEOUS APPROACH: ICD-10-PCS | Performed by: INTERNAL MEDICINE

## 2019-07-05 PROCEDURE — 02HP32Z INSERTION OF MONITORING DEVICE INTO PULMONARY TRUNK, PERCUTANEOUS APPROACH: ICD-10-PCS | Performed by: INTERNAL MEDICINE

## 2019-07-05 PROCEDURE — 82805 BLOOD GASES W/O2 SATURATION: CPT | Performed by: STUDENT IN AN ORGANIZED HEALTH CARE EDUCATION/TRAINING PROGRAM

## 2019-07-05 PROCEDURE — 83735 ASSAY OF MAGNESIUM: CPT | Performed by: INTERNAL MEDICINE

## 2019-07-05 PROCEDURE — 25000125 ZZHC RX 250: Performed by: PHYSICIAN ASSISTANT

## 2019-07-05 PROCEDURE — 93005 ELECTROCARDIOGRAM TRACING: CPT

## 2019-07-05 PROCEDURE — 25000131 ZZH RX MED GY IP 250 OP 636 PS 637: Performed by: STUDENT IN AN ORGANIZED HEALTH CARE EDUCATION/TRAINING PROGRAM

## 2019-07-05 PROCEDURE — 80048 BASIC METABOLIC PNL TOTAL CA: CPT | Performed by: PHYSICIAN ASSISTANT

## 2019-07-05 PROCEDURE — 4A133B3 MONITORING OF ARTERIAL PRESSURE, PULMONARY, PERCUTANEOUS APPROACH: ICD-10-PCS | Performed by: INTERNAL MEDICINE

## 2019-07-05 PROCEDURE — 4A1239Z MONITORING OF CARDIAC OUTPUT, PERCUTANEOUS APPROACH: ICD-10-PCS | Performed by: INTERNAL MEDICINE

## 2019-07-05 PROCEDURE — 80053 COMPREHEN METABOLIC PANEL: CPT | Performed by: INTERNAL MEDICINE

## 2019-07-05 PROCEDURE — 00000146 ZZHCL STATISTIC GLUCOSE BY METER IP

## 2019-07-05 PROCEDURE — 85610 PROTHROMBIN TIME: CPT | Performed by: INTERNAL MEDICINE

## 2019-07-05 PROCEDURE — 93010 ELECTROCARDIOGRAM REPORT: CPT | Performed by: INTERNAL MEDICINE

## 2019-07-05 PROCEDURE — 93451 RIGHT HEART CATH: CPT | Performed by: INTERNAL MEDICINE

## 2019-07-05 PROCEDURE — 40000196 ZZH STATISTIC RAPCV CVP MONITORING

## 2019-07-05 PROCEDURE — 83036 HEMOGLOBIN GLYCOSYLATED A1C: CPT | Performed by: INTERNAL MEDICINE

## 2019-07-05 PROCEDURE — 25000125 ZZHC RX 250: Performed by: INTERNAL MEDICINE

## 2019-07-05 PROCEDURE — 83615 LACTATE (LD) (LDH) ENZYME: CPT | Performed by: INTERNAL MEDICINE

## 2019-07-05 PROCEDURE — 40000048 ZZH STATISTIC DAILY SWAN MONITORING

## 2019-07-05 PROCEDURE — 25000132 ZZH RX MED GY IP 250 OP 250 PS 637: Performed by: STUDENT IN AN ORGANIZED HEALTH CARE EDUCATION/TRAINING PROGRAM

## 2019-07-05 PROCEDURE — 99221 1ST HOSP IP/OBS SF/LOW 40: CPT | Mod: 25 | Performed by: INTERNAL MEDICINE

## 2019-07-05 PROCEDURE — 93451 RIGHT HEART CATH: CPT | Mod: 26 | Performed by: INTERNAL MEDICINE

## 2019-07-05 PROCEDURE — 40000172 ZZH STATISTIC PROCEDURE PREP ONLY

## 2019-07-05 RX ORDER — NICOTINE POLACRILEX 4 MG
15-30 LOZENGE BUCCAL
Status: DISCONTINUED | OUTPATIENT
Start: 2019-07-05 | End: 2019-07-08 | Stop reason: HOSPADM

## 2019-07-05 RX ORDER — ROSUVASTATIN CALCIUM 10 MG/1
20 TABLET, COATED ORAL AT BEDTIME
Status: DISCONTINUED | OUTPATIENT
Start: 2019-07-05 | End: 2019-07-08 | Stop reason: HOSPADM

## 2019-07-05 RX ORDER — FAMOTIDINE 20 MG/1
20 TABLET, FILM COATED ORAL 2 TIMES DAILY
Status: DISCONTINUED | OUTPATIENT
Start: 2019-07-05 | End: 2019-07-08 | Stop reason: HOSPADM

## 2019-07-05 RX ORDER — DEXTROSE MONOHYDRATE 25 G/50ML
25-50 INJECTION, SOLUTION INTRAVENOUS
Status: DISCONTINUED | OUTPATIENT
Start: 2019-07-05 | End: 2019-07-05

## 2019-07-05 RX ORDER — DEXTROSE MONOHYDRATE 25 G/50ML
25-50 INJECTION, SOLUTION INTRAVENOUS
Status: DISCONTINUED | OUTPATIENT
Start: 2019-07-05 | End: 2019-07-08 | Stop reason: HOSPADM

## 2019-07-05 RX ORDER — HYDRALAZINE HYDROCHLORIDE 50 MG/1
100 TABLET, FILM COATED ORAL 3 TIMES DAILY
Status: DISCONTINUED | OUTPATIENT
Start: 2019-07-05 | End: 2019-07-08 | Stop reason: HOSPADM

## 2019-07-05 RX ORDER — ACETAMINOPHEN 325 MG/1
650 TABLET ORAL EVERY 6 HOURS PRN
Status: DISCONTINUED | OUTPATIENT
Start: 2019-07-05 | End: 2019-07-08 | Stop reason: HOSPADM

## 2019-07-05 RX ORDER — LISINOPRIL 2.5 MG/1
7.5 TABLET ORAL DAILY
Status: DISCONTINUED | OUTPATIENT
Start: 2019-07-05 | End: 2019-07-07

## 2019-07-05 RX ORDER — CARVEDILOL 12.5 MG/1
12.5 TABLET ORAL 2 TIMES DAILY WITH MEALS
Status: DISCONTINUED | OUTPATIENT
Start: 2019-07-05 | End: 2019-07-08 | Stop reason: HOSPADM

## 2019-07-05 RX ORDER — LIDOCAINE 40 MG/G
CREAM TOPICAL
Status: COMPLETED | OUTPATIENT
Start: 2019-07-05 | End: 2019-07-05

## 2019-07-05 RX ORDER — NICOTINE POLACRILEX 4 MG
15-30 LOZENGE BUCCAL
Status: DISCONTINUED | OUTPATIENT
Start: 2019-07-05 | End: 2019-07-05

## 2019-07-05 RX ORDER — AMIODARONE HYDROCHLORIDE 200 MG/1
200 TABLET ORAL DAILY
Status: DISCONTINUED | OUTPATIENT
Start: 2019-07-06 | End: 2019-07-08 | Stop reason: HOSPADM

## 2019-07-05 RX ORDER — ASPIRIN 81 MG/1
81 TABLET, CHEWABLE ORAL DAILY
Status: DISCONTINUED | OUTPATIENT
Start: 2019-07-06 | End: 2019-07-08 | Stop reason: HOSPADM

## 2019-07-05 RX ORDER — DIGOXIN 125 MCG
125 TABLET ORAL DAILY
Status: DISCONTINUED | OUTPATIENT
Start: 2019-07-06 | End: 2019-07-08 | Stop reason: HOSPADM

## 2019-07-05 RX ADMIN — INSULIN GLARGINE 20 UNITS: 100 INJECTION, SOLUTION SUBCUTANEOUS at 21:56

## 2019-07-05 RX ADMIN — HYDRALAZINE HYDROCHLORIDE 100 MG: 50 TABLET ORAL at 16:23

## 2019-07-05 RX ADMIN — CARVEDILOL 12.5 MG: 12.5 TABLET, FILM COATED ORAL at 17:37

## 2019-07-05 RX ADMIN — ROSUVASTATIN CALCIUM 20 MG: 10 TABLET, FILM COATED ORAL at 21:28

## 2019-07-05 RX ADMIN — LISINOPRIL 7.5 MG: 2.5 TABLET ORAL at 16:24

## 2019-07-05 RX ADMIN — FAMOTIDINE 20 MG: 20 TABLET ORAL at 20:00

## 2019-07-05 RX ADMIN — HYDRALAZINE HYDROCHLORIDE 100 MG: 50 TABLET ORAL at 20:00

## 2019-07-05 RX ADMIN — INSULIN ASPART 2 UNITS: 100 INJECTION, SOLUTION INTRAVENOUS; SUBCUTANEOUS at 17:35

## 2019-07-05 RX ADMIN — LIDOCAINE: 40 CREAM TOPICAL at 10:15

## 2019-07-05 ASSESSMENT — ACTIVITIES OF DAILY LIVING (ADL)
BATHING: 0-->INDEPENDENT
TRANSFERRING: 0-->INDEPENDENT
RETIRED_EATING: 0-->INDEPENDENT
DRESS: 0-->INDEPENDENT
AMBULATION: 0-->INDEPENDENT
TOILETING: 0-->INDEPENDENT
FALL_HISTORY_WITHIN_LAST_SIX_MONTHS: NO
SWALLOWING: 0-->SWALLOWS FOODS/LIQUIDS WITHOUT DIFFICULTY
RETIRED_COMMUNICATION: 0-->UNDERSTANDS/COMMUNICATES WITHOUT DIFFICULTY
ADLS_ACUITY_SCORE: 11
COGNITION: 0 - NO COGNITION ISSUES REPORTED
ADLS_ACUITY_SCORE: 11

## 2019-07-05 ASSESSMENT — PAIN DESCRIPTION - DESCRIPTORS: DESCRIPTORS: DISCOMFORT

## 2019-07-05 ASSESSMENT — MIFFLIN-ST. JEOR: SCORE: 1625.51

## 2019-07-05 NOTE — PHARMACY-ANTICOAGULATION SERVICE
Clinical Pharmacy - Warfarin Dosing Consult     Pharmacy has been consulted to manage this patient s warfarin therapy.  Indication: LVAD/RVAD  Therapy Goal: INR 2-3  Warfarin Prior to Admission: Yes  Warfarin PTA Regimen: 7.5mg every Mon, Wed, Fri; 5mg all other days, last dose this AM  Significant drug interactions: aspirin, amiodarone       INR   Date Value Ref Range Status   07/05/2019 3.14 (H) 0.86 - 1.14 Final   07/05/2019 3.10 (H) 0.86 - 1.14 Final     Chromogenic Factor 10   Date Value Ref Range Status   01/11/2019 72 70 - 130 % Final     Comment:     Therapeutic Range:  A Chromogenic Factor 10 level of approximately 20-40%   inversely correlates with an INR of 2-3 for patients receiving Warfarin.   Chromogenic Factor 10 levels below 20% indicate an INR greater than 3 and   levels above 40% indicate an INR less than 2.         Recommend no warfarin dose tonight as patient took warfarin 5mg this AM and INR is supra therapeutic.  Note that patient forgot to take his warfarin last night so took 5mg early this am.  Pharmacy will monitor Mariusz Sharp daily and order warfarin doses to achieve specified goal.      Please contact pharmacy as soon as possible if the warfarin needs to be held for a procedure or if the warfarin goals change.      Winifred hCristine, PharmD  Pager: [phone]

## 2019-07-05 NOTE — PROGRESS NOTES
"CLINICAL NUTRITION SERVICES    Reason for Assessment:  Low-sodium (2 g/day) nutrition education    Diet History:  Pt reports receiving low-sodium nutrition education in the past multiple times. Reports he has a difficult time following low sodium diet at home d/t liking salty foods (knows what to do, but doesn't follow recommendations); however, he shares that he rarely uses a salt shaker on foods. Eats out once a week (eg, Mexican food). He had no nutrition questions/concerns at this time. Shares his appetite is \"a little too good\".    Nutrition Diagnosis:  None.    Nutrition Prescription/Recs:  Continue low-sodium diet.      Interventions:  Nutrition Education  -Provided brief verbal review of low sodium diet.     Goals:    Pt will verbalize at least five high sodium foods and the importance of avoiding added salt to foods for cooking or seasoning foods.     Follow-up:   Patient to ask any further nutrition-related questions before discharge. In addition, pt may request outpatient RD appointment.    Etelvina Tang RD, LD  Pager: [phone]      "

## 2019-07-05 NOTE — PLAN OF CARE
D/I/A: Pt arrived from cath lab   Neuro intact, standby assist. HR bradycardic in 50s. BP hypertensive, PTA meds restarted. Frequent LVAD alarms, Flows 2-4s, PI's 2-13, Power 4's, Speed 5300. High glucs, home insulin regimen ordered.   P: Monitor LVAD alarms & hemodynamics.

## 2019-07-05 NOTE — PROGRESS NOTES
Prepped for RH.  Denies pain.  Has HM-3 LVAD.  Plans to be admitted to hospital post procedure.  Brother & sister-in-law available post procedure.  Needs consent.  H & P current.  Labs pending.

## 2019-07-05 NOTE — Clinical Note
Potential access sites were evaluated for patency using ultrasound.   The right jugular vein was selected. Access was obtained under Sonosite and Fluoroscopic guidance using a micropuncture 21 guage needle with direct visualization of needle entry.

## 2019-07-05 NOTE — PLAN OF CARE
Admitted/transferred from: Planned admit from home for cath lab swan placement  Reason for admission/transfer: LVAD alarms  Patient status upon admission/transfer: A&Ox4, call light appropriate  Interventions: [place] placed, labs drawn, EKG done  Plan: Trend hemodynamics, monitor LVAD alarms   2 RN skin assessment: completed by Elisa GARCIA RN & Ynes PEREZ RN  Result of skin assessment and interventions/actions: Skin WNL, driveline site intact w/ minimal redness.  Height, weight, drug calc weight: done  Patient belongings: In room w/ patient. 1 large suitcase w/ clothing, 1 small suitcase, LVAD backup supplies and batteries, cell phone/  MDRO education (if applicable):  N/A

## 2019-07-05 NOTE — H&P
Cardiology History and Physical  Mariusz Sharp MRN: [id number]  Age: 58 year old, : 1960  Primary care provider: He, Like            Assessment and Plan:     Mr. Sharp is a 59 y/o M w/ ICM EF 20-25% s/p HeartMate 3 2018, CAD (STEMI s/p PCI LAD 2018), monomorphic VT s/p ICD, LV thrombus, CKD, pAfib, DM presenting from cath lab.       #Chronic systolic heart failure secondary to ICM s/p HM3 18  Stage C, NYHA Class IV  --ACEi/ARB: on lisinopril 7.5 mg PO QD  --Hydralazine 100 mg PO TID  --BB: Coreg 12.5 mg PO BID  --SCD prophylaxis ICD     #Low flow alarms:  Given persistent LVAD alarms with low flows PI events with speed drops that are symptomatic with dizziness. RHC from  demonstrating euvolemia.  -Ambulate patient and correlate alarms and symptoms     #CAD:  s/p STEMI with ALYSSA to LAD 18  -Continue Crestor and BB    #DM:  -Glargine 10u subcutaneous at bedtime (down from home 20u)  -ISS     #Paroxysmal Afib  CHADSVASC-4.   - Coumadin per AC.   - Continue BB, amiodarone.      #VT/VF:  With cardiac arrest.   - No recent ICD shocks     #LV thrombus:  -Coumadin as above.      Code Status: FULL CODE     Patient discussed with staff attending, Dr. Ortiz.     Davis Juarez MD  Cardiology Fellow  Pager: [phone]       Late entry - pt seen and examined on 19  I have reviewed today's vital signs, notes, medications, labs and imaging. I have also seen and examined the patient and agree with the findings and plan as outlined above.    Jenni Ortiz MD  Section Head - Advanced Heart Failure, Transplantation and Mechanical Circulatory Support  Director - Adult [facility]en[facility]ovascular [facility]  Associate Professor of Medicine, [facility]           Chief Complaint:     Low flow alarms, lightheadedness          History of Present Illness:     Mr. Sharp is a 59 y/o M w/ ICM EF 20-25% s/p HeartMate 3 2018, CAD (STEMI s/p PCI LAD 2018), monomorphic VT  s/p ICD, LV thrombus, CKD, pAfib, DM presenting from cath lab.     58 year old male with a past medical history including CAD (s/p STEMI with ALYSSA to LAD 6/27/18), monomorphic VT s/p ICD shock times four 7/16/18, LV thrombus, CKD Stage III, PAF, DM Type II, and ICM with EF 20-25% who is now s/p HeartMate 3 LVAD implant 12/31/18.     Has been seen at core clinic with low flow alarms that are asymptomatic thought to be related to afterload and volume status. CT was done there is no problem with the LVAD positioning. The dizziness has not changed much happens more after taking BP meds.     Patient presented to cath lab on 7/5/19 w/ RHC showing CVP 10, PCWP 14, CI 2.0.     Upon cardiology assessment after cath lab, patient reports that since changing his BPs meds at last clinic visit, the LVAD alarms have been more in frequency, but lightheadedness and dizziness is better.          Past Medical History:     Past Medical History:   Diagnosis Date     Acute kidney injury (H)      CKD (chronic kidney disease)      Coronary artery disease      Diabetes mellitus (H)      HTN (hypertension)      Hyperlipidemia      Ischemic cardiomyopathy      LV (left ventricular) mural thrombus      Paroxysmal atrial flutter (H)      RVF (right ventricular failure) (H)      Systolic heart failure (H)      Ventricular tachycardia (H)               Past Surgical History:      Past Surgical History:   Procedure Laterality Date     COLONOSCOPY N/A 12/7/2018    Procedure: COLONOSCOPY;  Surgeon: Krish Mercado MD;  Location:  OR      RIGHT HEART CATH N/A 2/19/2019    Procedure: RHC;  Surgeon: Brian Long MD;  Location:  HEART CARDIAC CATH LAB      PRQ TRLUML CORONARY ANGIOPLASTY ADDL BRANCH      PCI and ALYSSA to ostial LAD on 6/27/18     IMPLANT AUTOMATIC IMPLANTABLE CARDIOVERTER DEFIBRILLATOR       INSERT VENTRICULAR ASSIST DEVICE LEFT (HEARTMATE II) N/A 12/31/2018    Procedure: Median Sternotomy, On Cardiopulmonary Bypass Pump,  Insertion of Left Ventricular Assist Device (Heartmate III);  Surgeon: Kamaljit Baker MD;  Location: [facility]              Social History:     Social History     Socioeconomic History     Marital status: Single     Spouse name: Not on file     Number of children: Not on file     Years of education: Not on file     Highest education level: Not on file   Occupational History     Not on file   Social Needs     Financial resource strain: Not on file     Food insecurity:     Worry: Not on file     Inability: Not on file     Transportation needs:     Medical: Not on file     Non-medical: Not on file   Tobacco Use     Smoking status: Never Smoker     Smokeless tobacco: Never Used   Substance and Sexual Activity     Alcohol use: No     Frequency: Never     Drug use: No     Sexual activity: Not on file   Lifestyle     Physical activity:     Days per week: Not on file     Minutes per session: Not on file     Stress: Not on file   Relationships     Social connections:     Talks on phone: Not on file     Gets together: Not on file     Attends Jew service: Not on file     Active member of club or organization: Not on file     Attends meetings of clubs or organizations: Not on file     Relationship status: Not on file     Intimate partner violence:     Fear of current or ex partner: Not on file     Emotionally abused: Not on file     Physically abused: Not on file     Forced sexual activity: Not on file   Other Topics Concern     Not on file   Social History Narrative     Not on file              Family History:     History reviewed. No pertinent family history.  Family history reviewed and updated in EPIC          Allergies:     No Known Allergies           Medications:     Facility-Administered Medications Prior to Admission   Medication Dose Route Frequency Provider Last Rate Last Dose     lidocaine (XYLOCAINE) 2 % external gel   Urethral Once Megan Ibarra MD         Medications Prior to Admission   Medication  Sig Dispense Refill Last Dose     amiodarone (PACERONE/CODARONE) 200 MG tablet Take 1 tablet (200 mg) by mouth daily 90 tablet 3 2019 at 0800     aspirin (ASA) 81 MG chewable tablet Take 1 tablet (81 mg) by mouth daily 90 tablet 3 2019 at 0800     carvedilol (COREG) 12.5 MG tablet Take 1 tablet (12.5 mg) by mouth 2 times daily (with meals) 60 tablet 3 2019 at 0500     digoxin (LANOXIN) 125 MCG tablet Take 1 tablet (125 mcg) by mouth daily 90 tablet 3 2019 at 0800     famotidine (PEPCID) 20 MG tablet Take 1 tablet (20 mg) by mouth 2 times daily 180 tablet 3 2019 at 0800     hydrALAZINE (APRESOLINE) 50 MG tablet Take 2 tablets (100 mg) by mouth 3 times daily 90 tablet 3 2019 at 0500     insulin glargine (LANTUS SOLOSTAR PEN) 100 UNIT/ML pen Inject 20 Units Subcutaneous At Bedtime   2019 at Unknown time     insulin glargine (LANTUS SOLOSTAR PEN) 100 UNIT/ML pen Inject 20 Units Subcutaneous At Bedtime 6 mL 0 2019 at Unknown time     lisinopril (PRINIVIL/ZESTRIL) 2.5 MG tablet Take 3 tablets (7.5 mg) by mouth daily 90 tablet 11 2019 at 1400     rosuvastatin (CRESTOR) 20 MG tablet Take 1 tablet (20 mg) by mouth At Bedtime 90 tablet 3 2019 at 2100     warfarin (COUMADIN) 2.5 MG tablet Take two to three tablets daily or as directed by the Coumadin clinic 150 tablet 5 2019 at 0500     [] acetaminophen (TYLENOL) 325 MG tablet Take 2 tablets (650 mg) by mouth every 6 hours as needed for pain 1 Bottle 0 Taking     insulin aspart (NOVOLOG PEN) 100 UNIT/ML pen Inject 1-10 Units Subcutaneous 3 times daily (before meals) Correction Scale - HIGH INSULIN RESISTANCE DOSING     -164 =1 units.   -189 =2.   -214 =3.   -239 =4.   -264 =5.   -289 =6.   -314 =7.   -339 =8.   -364 =9.  BG greater than or equal to 365 give 10 units  To be given with meal insulin, based on pre-meal BG 9 mL 0 Taking     insulin aspart (NOVOLOG PEN) 100  UNIT/ML pen Inject 1-7 Units Subcutaneous At Bedtime HIGH INSULIN RESISTANCE DOSING    Bedtime  For  - 224 give 1 units.   For  - 249 give 2 units.   For  - 274 give 3 units.   For  - 299 give 4 units.   For  - 324 give 5 units.   For  - 349 give 6 units.   For BG greater than or equal to 350 give 7 units. (Patient not taking: Reported on 2019) 2.1 mL 0 Not Taking     insulin aspart (NOVOLOG PEN) 100 UNIT/ML pen Prandial Dosin units per 10 grams of carbohydrate each Meal or Snack.  Only chart total amount of units given.  Do not give if pre-prandial glucose is less than 60 mg/dL. If given at mealtime, administer within 30 minutes of start of meal.   Taking     nitroGLYcerin (NITROSTAT) 0.4 MG sublingual tablet Place 1 tablet (0.4 mg) under the tongue every 5 minutes as needed for chest pain For chest pain place 1 tablet under the tongue every 5 minutes for 3 doses. If symptoms persist 5 minutes after 1st dose call 911. 10 tablet 0 Taking     [] senna-docusate (SENOKOT-S/PERICOLACE) 8.6-50 MG tablet Take 1 tablet by mouth daily as needed for constipation hold for loose stools 60 tablet 0 Taking                    Physical Exam:     B/P: 129/98, T: 98, P: Data Unavailable, R: 18    Wt Readings from Last 4 Encounters:   19 88.5 kg (195 lb)   19 93.9 kg (207 lb 1.6 oz)   19 86.2 kg (190 lb)   19 91.2 kg (201 lb)       No intake or output data in the 24 hours ending 19 1433    Gen: No acute distress  HEENT: NC/AT, PERRL, EOM intact, MMM, OP without exudates  PULM/THORAX: Clear to auscultation bilaterally, no rales/rhonchi/wheezes  CV: normal rate, regular rhythm, normal S1 and S2, LVAD hum  ABD: Soft, NTND, bowel sounds present, no masses  EXT: WWP. No LE edema.  NEURO: CN II-XII grossly intact. A&Ox3          Data:     Labs Reviewed on Admission  Pertinent for:  Lab Results   Component Value Date    TROPI 0.050 (H) 2018    TROPI  1.460 () 07/16/2018               Most Recent Imaging:     Echo: (6/26/19)  Interpretation Summary  LVAD cannula was seen in the expected anatomic position in the LV apex.  HM 3 at 5300RPM.  LVIDd 51mm.  Septum normal.  Aortic valve open with each systole. No AI.  Normal outfloiw velocity.  From limited views,LV EF ~ 30-35%.  Global right ventricular function is moderately reduced.  The inferior vena cava is normal.  No pericardial effusion is present.     Cath:  (7/5/19)  mRA 10  PA 30/11/18  PCW 14  CO/CI 3.85/1.99  RVP 1.04

## 2019-07-05 NOTE — Clinical Note
Sheath prepped and inserted in the right internal jugular vein.  Micropuncture used to obtain access

## 2019-07-06 ENCOUNTER — APPOINTMENT (OUTPATIENT)
Dept: GENERAL RADIOLOGY | Facility: CLINIC | Age: 59
End: 2019-07-06
Attending: INTERNAL MEDICINE
Payer: COMMERCIAL

## 2019-07-06 ENCOUNTER — APPOINTMENT (OUTPATIENT)
Dept: OCCUPATIONAL THERAPY | Facility: CLINIC | Age: 59
End: 2019-07-06
Attending: INTERNAL MEDICINE
Payer: COMMERCIAL

## 2019-07-06 LAB
ANION GAP SERPL CALCULATED.3IONS-SCNC: 6 MMOL/L (ref 3–14)
BASE DEFICIT BLDV-SCNC: 0.9 MMOL/L
BASE DEFICIT BLDV-SCNC: 0.9 MMOL/L
BASE DEFICIT BLDV-SCNC: 1 MMOL/L
BASE DEFICIT BLDV-SCNC: 1.7 MMOL/L
BASOPHILS # BLD AUTO: 0.1 10E9/L (ref 0–0.2)
BASOPHILS NFR BLD AUTO: 0.9 %
BUN SERPL-MCNC: 29 MG/DL (ref 7–30)
CALCIUM SERPL-MCNC: 8.6 MG/DL (ref 8.5–10.1)
CHLORIDE SERPL-SCNC: 109 MMOL/L (ref 94–109)
CO2 SERPL-SCNC: 24 MMOL/L (ref 20–32)
CREAT SERPL-MCNC: 1.45 MG/DL (ref 0.66–1.25)
DIFFERENTIAL METHOD BLD: ABNORMAL
EOSINOPHIL # BLD AUTO: 0.2 10E9/L (ref 0–0.7)
EOSINOPHIL NFR BLD AUTO: 4.1 %
ERYTHROCYTE [DISTWIDTH] IN BLOOD BY AUTOMATED COUNT: 16.6 % (ref 10–15)
GFR SERPL CREATININE-BSD FRML MDRD: 53 ML/MIN/{1.73_M2}
GLUCOSE BLDC GLUCOMTR-MCNC: 138 MG/DL (ref 70–99)
GLUCOSE BLDC GLUCOMTR-MCNC: 185 MG/DL (ref 70–99)
GLUCOSE BLDC GLUCOMTR-MCNC: 232 MG/DL (ref 70–99)
GLUCOSE SERPL-MCNC: 134 MG/DL (ref 70–99)
HCO3 BLDV-SCNC: 23 MMOL/L (ref 21–28)
HCO3 BLDV-SCNC: 24 MMOL/L (ref 21–28)
HCT VFR BLD AUTO: 39.5 % (ref 40–53)
HGB BLD-MCNC: 12.2 G/DL (ref 13.3–17.7)
IMM GRANULOCYTES # BLD: 0 10E9/L (ref 0–0.4)
IMM GRANULOCYTES NFR BLD: 0.3 %
INR PPP: 3.41 (ref 0.86–1.14)
LDH SERPL L TO P-CCNC: 148 U/L (ref 85–227)
LYMPHOCYTES # BLD AUTO: 0.9 10E9/L (ref 0.8–5.3)
LYMPHOCYTES NFR BLD AUTO: 16 %
MAGNESIUM SERPL-MCNC: 2 MG/DL (ref 1.6–2.3)
MCH RBC QN AUTO: 26.9 PG (ref 26.5–33)
MCHC RBC AUTO-ENTMCNC: 30.9 G/DL (ref 31.5–36.5)
MCV RBC AUTO: 87 FL (ref 78–100)
MONOCYTES # BLD AUTO: 0.7 10E9/L (ref 0–1.3)
MONOCYTES NFR BLD AUTO: 11.6 %
NEUTROPHILS # BLD AUTO: 3.9 10E9/L (ref 1.6–8.3)
NEUTROPHILS NFR BLD AUTO: 67.1 %
NRBC # BLD AUTO: 0 10*3/UL
NRBC BLD AUTO-RTO: 0 /100
O2/TOTAL GAS SETTING VFR VENT: 21 %
OXYHGB MFR BLDV: 57 %
OXYHGB MFR BLDV: 57 %
OXYHGB MFR BLDV: 58 %
OXYHGB MFR BLDV: 59 %
PCO2 BLDV: 38 MM HG (ref 40–50)
PCO2 BLDV: 39 MM HG (ref 40–50)
PCO2 BLDV: 39 MM HG (ref 40–50)
PCO2 BLDV: 40 MM HG (ref 40–50)
PH BLDV: 7.38 PH (ref 7.32–7.43)
PH BLDV: 7.39 PH (ref 7.32–7.43)
PLATELET # BLD AUTO: 200 10E9/L (ref 150–450)
PO2 BLDV: 31 MM HG (ref 25–47)
PO2 BLDV: 32 MM HG (ref 25–47)
PO2 BLDV: 32 MM HG (ref 25–47)
PO2 BLDV: 33 MM HG (ref 25–47)
POTASSIUM SERPL-SCNC: 3.8 MMOL/L (ref 3.4–5.3)
RBC # BLD AUTO: 4.53 10E12/L (ref 4.4–5.9)
SODIUM SERPL-SCNC: 140 MMOL/L (ref 133–144)
URATE SERPL-MCNC: 5 MG/DL (ref 3.5–7.2)
WBC # BLD AUTO: 5.9 10E9/L (ref 4–11)

## 2019-07-06 PROCEDURE — 25000132 ZZH RX MED GY IP 250 OP 250 PS 637: Performed by: STUDENT IN AN ORGANIZED HEALTH CARE EDUCATION/TRAINING PROGRAM

## 2019-07-06 PROCEDURE — 85025 COMPLETE CBC W/AUTO DIFF WBC: CPT | Performed by: STUDENT IN AN ORGANIZED HEALTH CARE EDUCATION/TRAINING PROGRAM

## 2019-07-06 PROCEDURE — 83615 LACTATE (LD) (LDH) ENZYME: CPT | Performed by: STUDENT IN AN ORGANIZED HEALTH CARE EDUCATION/TRAINING PROGRAM

## 2019-07-06 PROCEDURE — 80048 BASIC METABOLIC PNL TOTAL CA: CPT | Performed by: STUDENT IN AN ORGANIZED HEALTH CARE EDUCATION/TRAINING PROGRAM

## 2019-07-06 PROCEDURE — 97165 OT EVAL LOW COMPLEX 30 MIN: CPT | Mod: GO | Performed by: OCCUPATIONAL THERAPIST

## 2019-07-06 PROCEDURE — 40000048 ZZH STATISTIC DAILY SWAN MONITORING

## 2019-07-06 PROCEDURE — 20000004 ZZH R&B ICU UMMC

## 2019-07-06 PROCEDURE — 84550 ASSAY OF BLOOD/URIC ACID: CPT | Performed by: STUDENT IN AN ORGANIZED HEALTH CARE EDUCATION/TRAINING PROGRAM

## 2019-07-06 PROCEDURE — 99233 SBSQ HOSP IP/OBS HIGH 50: CPT | Mod: GC | Performed by: INTERNAL MEDICINE

## 2019-07-06 PROCEDURE — 00000146 ZZHCL STATISTIC GLUCOSE BY METER IP

## 2019-07-06 PROCEDURE — 82805 BLOOD GASES W/O2 SATURATION: CPT | Performed by: STUDENT IN AN ORGANIZED HEALTH CARE EDUCATION/TRAINING PROGRAM

## 2019-07-06 PROCEDURE — 97535 SELF CARE MNGMENT TRAINING: CPT | Mod: GO | Performed by: OCCUPATIONAL THERAPIST

## 2019-07-06 PROCEDURE — 40000986 XR CHEST PORT 1 VW

## 2019-07-06 PROCEDURE — 83735 ASSAY OF MAGNESIUM: CPT | Performed by: STUDENT IN AN ORGANIZED HEALTH CARE EDUCATION/TRAINING PROGRAM

## 2019-07-06 PROCEDURE — 40000196 ZZH STATISTIC RAPCV CVP MONITORING

## 2019-07-06 PROCEDURE — 40000275 ZZH STATISTIC RCP TIME EA 10 MIN

## 2019-07-06 PROCEDURE — 85610 PROTHROMBIN TIME: CPT | Performed by: STUDENT IN AN ORGANIZED HEALTH CARE EDUCATION/TRAINING PROGRAM

## 2019-07-06 RX ADMIN — INSULIN GLARGINE 20 UNITS: 100 INJECTION, SOLUTION SUBCUTANEOUS at 21:48

## 2019-07-06 RX ADMIN — CARVEDILOL 12.5 MG: 12.5 TABLET, FILM COATED ORAL at 07:57

## 2019-07-06 RX ADMIN — HYDRALAZINE HYDROCHLORIDE 100 MG: 50 TABLET ORAL at 08:08

## 2019-07-06 RX ADMIN — HYDRALAZINE HYDROCHLORIDE 100 MG: 50 TABLET ORAL at 20:37

## 2019-07-06 RX ADMIN — HYDRALAZINE HYDROCHLORIDE 100 MG: 50 TABLET ORAL at 15:15

## 2019-07-06 RX ADMIN — FAMOTIDINE 20 MG: 20 TABLET ORAL at 08:04

## 2019-07-06 RX ADMIN — ROSUVASTATIN CALCIUM 20 MG: 10 TABLET, FILM COATED ORAL at 21:54

## 2019-07-06 RX ADMIN — LISINOPRIL 7.5 MG: 2.5 TABLET ORAL at 08:04

## 2019-07-06 RX ADMIN — AMIODARONE HYDROCHLORIDE 200 MG: 200 TABLET ORAL at 08:04

## 2019-07-06 RX ADMIN — FAMOTIDINE 20 MG: 20 TABLET ORAL at 20:40

## 2019-07-06 RX ADMIN — DIGOXIN 125 MCG: 125 TABLET ORAL at 10:26

## 2019-07-06 RX ADMIN — ASPIRIN 81 MG CHEWABLE TABLET 81 MG: 81 TABLET CHEWABLE at 08:04

## 2019-07-06 RX ADMIN — CARVEDILOL 12.5 MG: 12.5 TABLET, FILM COATED ORAL at 18:06

## 2019-07-06 ASSESSMENT — ACTIVITIES OF DAILY LIVING (ADL)
ADLS_ACUITY_SCORE: 11

## 2019-07-06 NOTE — PLAN OF CARE
Discharge Planner OT   Patient plan for discharge: home  Current status:  Pt ambulated 1,200 ft w/ no rest breaks and SBA throughout.  Pt's pre-vitals: BP 97/81, [id number],  Pt's post vitals:  BP 74/66, [id number], He up to 64 during ambulation and O2 steady on RA.   Barriers to return to prior living situation: medical status  Recommendations for discharge: Home w/ OP CR  Rationale for recommendations: to increase ind in ADLS/IADLS       Entered by: Olga Dubon 07/06/2019 1:26 PM

## 2019-07-06 NOTE — PLAN OF CARE
Low flow alarm occurred x1 to low 2's per patient report; unable to get PA waveforms/vbg as episode happened so fast; pt asymptomatic. No other alarms overnight. Flow's 2.8 - 4, speed 5350, PI 5.3 - 11.3, power's 3.8 - 4. PAP 38-35/16; CVP 11; CI 2.1; SVR 1790 - 1870; SV02 59 - 61. No c/o pain; VSS. Voiding in urinal. Will continue to assess and notify MD of any low-flow alarms.     Problem: Ventricular Assist Device (Adult)  Goal: Signs and Symptoms of Listed Potential Problems Will be Absent, Minimized or Managed (Ventricular Assist Device)  Description  Signs and symptoms of listed potential problems will be absent, minimized or managed by discharge/transition of care (reference Ventricular Assist Device (Adult) CPG).  Outcome: No Change

## 2019-07-06 NOTE — PROGRESS NOTES
[facility]   Cardiology Progress Note    Assessment & Plan   Mariusz Sharp is a 57 y/o M w/ ICM EF 20-25% s/p HeartMate 3 12/2018, CAD (STEMI s/p PCI LAD 6/27/2018), monomorphic VT s/p ICD, LV thrombus, CKD, pAfib, DM presenting with ongoing frequent LVAD alarms.    Changes Today:  - Monitor LVAD alarms and HD with different positions    # Chronic systolic heart failure secondary to ICM s/p HM3 12/31/18  Stage C, NYHA Class IV  - ACEi/ARB: Continue PTA lisinopril 7.5 mg PO QD  - Continue PTA Hydralazine 100 mg PO TID  - BB: Continue PTA Coreg 12.5 mg PO BID (persistently muriel at baseline)  - Digoxin 125 mcg daily  - SCD prophylaxis ICD  - ASA 81 mg daily  - Warfarin with Chromogenic factor 10 goal 20-40%     # Low Flow Alarms:  Given persistent LVAD alarms with low flows PI events with speed drops that are symptomatic with dizziness. RHC from 7/5 demonstrating euvolemia.  - Ambulate patient and correlate alarms/HD with symptoms  - Repeat ICD interrogation     # CAD:  s/p STEMI with ALYSSA to LAD 6/27/18  - Continue PTA ASA, Crestor, and BB     # DM:  - Glargine 20u subcutaneous at bedtime  - ISS with carb coverage     # Paroxysmal Afib  CHADSVASC-4.   - Warfarin as above  - Continue PTA BB, amiodarone      # VT/VF:  With cardiac arrest.   - No recent ICD shocks     # LV thrombus:  - Coumadin as above    FEN: Cardiac diet  DVT Prophylaxis: Warfarin  Code Status: Prior  Dispo: Pending evaluation of frequent device alarms    Pt was seen and examined with Dr. Ortiz, who agrees with the assessment and plan.    Paola Chang MD  Cardiology Fellow      I have reviewed today's vital signs, notes, medications, labs and imaging. I have also seen and examined the patient and agree with the findings and plan as outlined above.    Jenni Otriz MD  Section Head - Advanced Heart Failure, Transplantation and Mechanical Circulatory Support  Director - Adult Congenital and Cardiovascular Genetics Center   of  "Medicine, [facility]          Interval History   No major overnight events. Still having frequent PI events, but less low flow alarms overnight.    ROS: A 4-point ROS was otherwise negative except as above.    Data reviewed today: I reviewed all new labs and imaging results over the last 24 hours. I personally reviewed:    Physical Exam   Temp: 98.5  F (36.9  C) Temp src: Oral BP: 115/90 Pulse: 55 Heart Rate: 52 Resp: 15 SpO2: 96 % O2 Device: None (Room air)    Vitals:    07/05/19 1001   Weight: 88.5 kg (195 lb)     Vital Signs with Ranges  Temp:  [97.6  F (36.4  C)-98.5  F (36.9  C)] 98.5  F (36.9  C)  Pulse:  [53-60] 55  Heart Rate:  [51-60] 52  Resp:  [9-18] 15  BP: ()/() 115/90  SpO2:  [93 %-99 %] 96 %  I/O last 3 completed shifts:  In: 480 [P.O.:480]  Out: 800 [Urine:800]    Heart Rate: 52, Blood pressure 115/90, pulse 55, temperature 98.5  F (36.9  C), temperature source Oral, resp. rate 15, height 1.626 m (5' 4\"), weight 88.5 kg (195 lb), SpO2 96 %.  195 lbs 0 oz  GEN:  Alert, interactive, appears comfortable, NAD  CV:  LVAD hum, JVP ~ 6 cm  LUNGS: CTAB, no wheezes or crackles  ABD: Active bowel sounds, soft, non-tender/non-distended  EXT:  No edema or cyanosis  SKIN: Warm and dry, no lesions on exposed surfaces    Medications     Warfarin Therapy Reminder         amiodarone  200 mg Oral Daily     aspirin  81 mg Oral Daily     carvedilol  12.5 mg Oral BID w/meals     digoxin  125 mcg Oral Daily     famotidine  20 mg Oral BID     hydrALAZINE  100 mg Oral TID     insulin aspart   Subcutaneous TID w/meals     insulin glargine  20 Units Subcutaneous At Bedtime     lisinopril  7.5 mg Oral Daily     rosuvastatin  20 mg Oral At Bedtime       Data   Recent Labs   Lab 07/06/19  0355 07/05/19  1453 07/05/19  0932   WBC 5.9 5.6 6.4   HGB 12.2* 12.2* 12.2*   MCV 87 88 90    199 211   INR 3.41* 3.14* 3.10*    140 138   POTASSIUM 3.8 4.1 4.1   CHLORIDE 109 108 110*   CO2 24 24 23   BUN " 29 32* 37*   CR 1.45* 1.55* 1.63*   ANIONGAP 6 7 5   ARLENE 8.6 8.3* 8.6   * 200* 209*   ALBUMIN  --  3.5  --    PROTTOTAL  --  6.7*  --    BILITOTAL  --  0.8  --    ALKPHOS  --  43  --    ALT  --  33  --    AST  --  29  --        No results found for this or any previous visit (from the past 24 hour(s)).

## 2019-07-06 NOTE — PROGRESS NOTES
07/06/19 1100   Quick Adds   Type of Visit Initial Occupational Therapy Evaluation   Living Environment   Lives With parent(s)  (dad)   Living Arrangements house   Home Accessibility stairs to enter home   Number of Stairs, Main Entrance 5   Stair Railings, Main Entrance   (one side)   Living Environment Comment OT: upper level and basement in house, pt stays on main leel   Functional Level   Ambulation 0-->independent   Transferring 0-->independent   Toileting 0-->independent   Bathing 0-->independent   Dressing 0-->independent   Eating 0-->independent   Communication 0-->understands/communicates without difficulty   Swallowing 0-->swallows foods/liquids without difficulty   Cognition 0 - no cognition issues reported   Fall history within last six months no   Which of the above functional risks had a recent onset or change? none   General Information   Onset of Illness/Injury or Date of Surgery - Date 07/05/19   Referring Physician Davis Juarez MD   Patient/Family Goals Statement to discharge home   Additional Occupational Profile Info/Pertinent History of Current Problem Mr. Sharp is a 57 y/o M w/ ICM EF 20-25% s/p HeartMate 3 12/2018, CAD (STEMI s/p PCI LAD 6/27/2018), monomorphic VT s/p ICD, LV thrombus, CKD, pAfib, DM presenting from cath lab.   Precautions/Limitations no known precautions/limitations   Cognitive Status Examination   Orientation orientation to person, place and time   Visual Perception   Visual Perception Wears glasses   Visual Perception Comments No acute changes   Sensory Examination   Sensory Quick Adds No deficits were identified   Range of Motion (ROM)   ROM Quick Adds No deficits were identified   Strength   Strength Comments OT: Overall deconditioned from hospitalization   Mobility   Bed Mobility Comments OT: Independent   Transfer Skills   Transfer Comments OT: Independent   Balance   Balance Comments OT: SBA   Instrumental Activities of Daily Living (IADL)   IADL Comments  "OT: family can A prn   Activities of Daily Living Analysis   Impairments Contributing to Impaired Activities of Daily Living strength decreased  (decreased activity tolerance)   General Therapy Interventions   Planned Therapy Interventions ADL retraining;IADL retraining;bed mobility training;strengthening;transfer training;home program guidelines;progressive activity/exercise;risk factor education   Clinical Impression   Criteria for Skilled Therapeutic Interventions Met yes, treatment indicated   OT Diagnosis decreased ind in ADLS/IADLS   Influenced by the following impairments generalized weakness    Assessment of Occupational Performance 1-3 Performance Deficits   Identified Performance Deficits decreased ind in ADLS/IADLS   Clinical Decision Making (Complexity) Low complexity   Therapy Frequency 3x/week   Predicted Duration of Therapy Intervention (days/wks) 7/25/19   Anticipated Discharge Disposition Home with Outpatient Therapy   Risks and Benefits of Treatment have been explained. Yes   Patient, Family & other staff in agreement with plan of care Yes   [facility] AM-PAC  \"6 Clicks\" Daily Activity Inpatient Short Form   1. Putting on and taking off regular lower body clothing? 4 - None   2. Bathing (including washing, rinsing, drying)? 4 - None   3. Toileting, which includes using toilet, bedpan or urinal? 4 - None   4. Putting on and taking off regular upper body clothing? 4 - None   5. Taking care of personal grooming such as brushing teeth? 4 - None   6. Eating meals? 4 - None   Daily Activity Raw Score (Score out of 24.Lower scores equate to lower levels of function) 24   Total Evaluation Time   Total Evaluation Time (Minutes) 4     "

## 2019-07-06 NOTE — PLAN OF CARE
D/I/A:  PA 30s/10s, SVO2 57, CI 1.9, CO 3.8, SVR 1500s. No LVAD alarms. Episode of dizziness while sitting up in chair, noted to have increased PVCs while PI's dropped to 1.5 & would jump up to 13 while speed bounced between 5100 to 5300. Lasted for about 5 minutes, improved when pt reclined in chair. PA's and CVP remained consistent throughout the event. All other vitals remained WNL.   P: Continue to monitor hemodynamics & LVAD alarms.

## 2019-07-07 ENCOUNTER — APPOINTMENT (OUTPATIENT)
Dept: CARDIOLOGY | Facility: CLINIC | Age: 59
End: 2019-07-07
Attending: INTERNAL MEDICINE
Payer: COMMERCIAL

## 2019-07-07 ENCOUNTER — APPOINTMENT (OUTPATIENT)
Dept: GENERAL RADIOLOGY | Facility: CLINIC | Age: 59
End: 2019-07-07
Attending: INTERNAL MEDICINE
Payer: COMMERCIAL

## 2019-07-07 LAB
ANION GAP SERPL CALCULATED.3IONS-SCNC: 7 MMOL/L (ref 3–14)
BASE EXCESS BLDV CALC-SCNC: 0.4 MMOL/L
BASOPHILS # BLD AUTO: 0.1 10E9/L (ref 0–0.2)
BASOPHILS NFR BLD AUTO: 0.9 %
BUN SERPL-MCNC: 26 MG/DL (ref 7–30)
CALCIUM SERPL-MCNC: 8.4 MG/DL (ref 8.5–10.1)
CHLORIDE SERPL-SCNC: 107 MMOL/L (ref 94–109)
CO2 SERPL-SCNC: 24 MMOL/L (ref 20–32)
CREAT SERPL-MCNC: 1.49 MG/DL (ref 0.66–1.25)
DIFFERENTIAL METHOD BLD: ABNORMAL
EOSINOPHIL # BLD AUTO: 0.4 10E9/L (ref 0–0.7)
EOSINOPHIL NFR BLD AUTO: 6.5 %
ERYTHROCYTE [DISTWIDTH] IN BLOOD BY AUTOMATED COUNT: 16.3 % (ref 10–15)
GFR SERPL CREATININE-BSD FRML MDRD: 51 ML/MIN/{1.73_M2}
GLUCOSE BLDC GLUCOMTR-MCNC: 150 MG/DL (ref 70–99)
GLUCOSE BLDC GLUCOMTR-MCNC: 168 MG/DL (ref 70–99)
GLUCOSE BLDC GLUCOMTR-MCNC: 200 MG/DL (ref 70–99)
GLUCOSE BLDC GLUCOMTR-MCNC: 231 MG/DL (ref 70–99)
GLUCOSE SERPL-MCNC: 184 MG/DL (ref 70–99)
HCO3 BLDV-SCNC: 26 MMOL/L (ref 21–28)
HCT VFR BLD AUTO: 38.9 % (ref 40–53)
HGB BLD-MCNC: 12.1 G/DL (ref 13.3–17.7)
IMM GRANULOCYTES # BLD: 0 10E9/L (ref 0–0.4)
IMM GRANULOCYTES NFR BLD: 0.2 %
INR PPP: 2.58 (ref 0.86–1.14)
LDH SERPL L TO P-CCNC: 158 U/L (ref 85–227)
LYMPHOCYTES # BLD AUTO: 1.1 10E9/L (ref 0.8–5.3)
LYMPHOCYTES NFR BLD AUTO: 17.5 %
MAGNESIUM SERPL-MCNC: 1.9 MG/DL (ref 1.6–2.3)
MCH RBC QN AUTO: 27.1 PG (ref 26.5–33)
MCHC RBC AUTO-ENTMCNC: 31.1 G/DL (ref 31.5–36.5)
MCV RBC AUTO: 87 FL (ref 78–100)
MONOCYTES # BLD AUTO: 0.8 10E9/L (ref 0–1.3)
MONOCYTES NFR BLD AUTO: 12.5 %
NEUTROPHILS # BLD AUTO: 4 10E9/L (ref 1.6–8.3)
NEUTROPHILS NFR BLD AUTO: 62.4 %
NRBC # BLD AUTO: 0 10*3/UL
NRBC BLD AUTO-RTO: 0 /100
O2/TOTAL GAS SETTING VFR VENT: 21 %
OXYHGB MFR BLDV: 57 %
PCO2 BLDV: 45 MM HG (ref 40–50)
PH BLDV: 7.37 PH (ref 7.32–7.43)
PLATELET # BLD AUTO: 189 10E9/L (ref 150–450)
PO2 BLDV: 32 MM HG (ref 25–47)
POTASSIUM SERPL-SCNC: 3.9 MMOL/L (ref 3.4–5.3)
RBC # BLD AUTO: 4.47 10E12/L (ref 4.4–5.9)
SODIUM SERPL-SCNC: 138 MMOL/L (ref 133–144)
WBC # BLD AUTO: 6.5 10E9/L (ref 4–11)

## 2019-07-07 PROCEDURE — 80048 BASIC METABOLIC PNL TOTAL CA: CPT | Performed by: STUDENT IN AN ORGANIZED HEALTH CARE EDUCATION/TRAINING PROGRAM

## 2019-07-07 PROCEDURE — 00000146 ZZHCL STATISTIC GLUCOSE BY METER IP

## 2019-07-07 PROCEDURE — 82805 BLOOD GASES W/O2 SATURATION: CPT | Performed by: STUDENT IN AN ORGANIZED HEALTH CARE EDUCATION/TRAINING PROGRAM

## 2019-07-07 PROCEDURE — 25000132 ZZH RX MED GY IP 250 OP 250 PS 637: Performed by: INTERNAL MEDICINE

## 2019-07-07 PROCEDURE — 85610 PROTHROMBIN TIME: CPT | Performed by: STUDENT IN AN ORGANIZED HEALTH CARE EDUCATION/TRAINING PROGRAM

## 2019-07-07 PROCEDURE — 40000196 ZZH STATISTIC RAPCV CVP MONITORING

## 2019-07-07 PROCEDURE — 93306 TTE W/DOPPLER COMPLETE: CPT | Mod: 26 | Performed by: INTERNAL MEDICINE

## 2019-07-07 PROCEDURE — 83615 LACTATE (LD) (LDH) ENZYME: CPT | Performed by: STUDENT IN AN ORGANIZED HEALTH CARE EDUCATION/TRAINING PROGRAM

## 2019-07-07 PROCEDURE — 40000141 ZZH STATISTIC PERIPHERAL IV START W/O US GUIDANCE

## 2019-07-07 PROCEDURE — 20000004 ZZH R&B ICU UMMC

## 2019-07-07 PROCEDURE — 85025 COMPLETE CBC W/AUTO DIFF WBC: CPT | Performed by: STUDENT IN AN ORGANIZED HEALTH CARE EDUCATION/TRAINING PROGRAM

## 2019-07-07 PROCEDURE — 40000048 ZZH STATISTIC DAILY SWAN MONITORING

## 2019-07-07 PROCEDURE — 99233 SBSQ HOSP IP/OBS HIGH 50: CPT | Mod: 25 | Performed by: INTERNAL MEDICINE

## 2019-07-07 PROCEDURE — 25000132 ZZH RX MED GY IP 250 OP 250 PS 637: Performed by: STUDENT IN AN ORGANIZED HEALTH CARE EDUCATION/TRAINING PROGRAM

## 2019-07-07 PROCEDURE — 40000275 ZZH STATISTIC RCP TIME EA 10 MIN

## 2019-07-07 PROCEDURE — 93306 TTE W/DOPPLER COMPLETE: CPT

## 2019-07-07 PROCEDURE — 83735 ASSAY OF MAGNESIUM: CPT | Performed by: STUDENT IN AN ORGANIZED HEALTH CARE EDUCATION/TRAINING PROGRAM

## 2019-07-07 PROCEDURE — 71045 X-RAY EXAM CHEST 1 VIEW: CPT

## 2019-07-07 RX ORDER — LISINOPRIL 10 MG/1
10 TABLET ORAL DAILY
Status: DISCONTINUED | OUTPATIENT
Start: 2019-07-08 | End: 2019-07-08 | Stop reason: HOSPADM

## 2019-07-07 RX ORDER — WARFARIN SODIUM 7.5 MG/1
7.5 TABLET ORAL
Status: COMPLETED | OUTPATIENT
Start: 2019-07-07 | End: 2019-07-07

## 2019-07-07 RX ORDER — LISINOPRIL 2.5 MG/1
2.5 TABLET ORAL ONCE
Status: COMPLETED | OUTPATIENT
Start: 2019-07-07 | End: 2019-07-07

## 2019-07-07 RX ADMIN — WARFARIN SODIUM 7.5 MG: 7.5 TABLET ORAL at 18:00

## 2019-07-07 RX ADMIN — HYDRALAZINE HYDROCHLORIDE 100 MG: 50 TABLET ORAL at 07:32

## 2019-07-07 RX ADMIN — FAMOTIDINE 20 MG: 20 TABLET ORAL at 20:15

## 2019-07-07 RX ADMIN — HYDRALAZINE HYDROCHLORIDE 100 MG: 50 TABLET ORAL at 15:40

## 2019-07-07 RX ADMIN — DIGOXIN 125 MCG: 125 TABLET ORAL at 07:32

## 2019-07-07 RX ADMIN — LISINOPRIL 2.5 MG: 2.5 TABLET ORAL at 10:17

## 2019-07-07 RX ADMIN — HYDRALAZINE HYDROCHLORIDE 100 MG: 50 TABLET ORAL at 20:15

## 2019-07-07 RX ADMIN — FAMOTIDINE 20 MG: 20 TABLET ORAL at 07:32

## 2019-07-07 RX ADMIN — LISINOPRIL 7.5 MG: 2.5 TABLET ORAL at 07:32

## 2019-07-07 RX ADMIN — CARVEDILOL 12.5 MG: 12.5 TABLET, FILM COATED ORAL at 18:00

## 2019-07-07 RX ADMIN — ROSUVASTATIN CALCIUM 20 MG: 10 TABLET, FILM COATED ORAL at 22:20

## 2019-07-07 RX ADMIN — CARVEDILOL 12.5 MG: 12.5 TABLET, FILM COATED ORAL at 07:33

## 2019-07-07 RX ADMIN — AMIODARONE HYDROCHLORIDE 200 MG: 200 TABLET ORAL at 07:32

## 2019-07-07 RX ADMIN — ASPIRIN 81 MG CHEWABLE TABLET 81 MG: 81 TABLET CHEWABLE at 07:32

## 2019-07-07 RX ADMIN — INSULIN GLARGINE 20 UNITS: 100 INJECTION, SOLUTION SUBCUTANEOUS at 22:21

## 2019-07-07 ASSESSMENT — ACTIVITIES OF DAILY LIVING (ADL)
ADLS_ACUITY_SCORE: 11

## 2019-07-07 ASSESSMENT — MIFFLIN-ST. JEOR: SCORE: 1614

## 2019-07-07 NOTE — PROGRESS NOTES
[facility]   Cardiology Progress Note    Assessment & Plan   Mariusz Sharp is a 57 y/o M w/ ICM EF 20-25% s/p HeartMate 3 12/2018, CAD (STEMI s/p PCI LAD 6/27/2018), monomorphic VT s/p ICD, LV thrombus, CKD, pAfib, DM presenting with ongoing frequent LVAD alarms.    Changes Today:  - Repeat echocardiogram  - Increase lisinopril to 10 mg daily  - Remove PA catheter and transfer to the floor    # Chronic systolic heart failure secondary to ICM s/p HM3 12/31/18  Stage C, NYHA Class IV  - ACEi/ARB: Increase PTA lisinopril to 10 mg PO QD  - Continue PTA Hydralazine 100 mg PO TID  - BB: Continue PTA Coreg 12.5 mg PO BID (persistently muriel at baseline)  - Digoxin 125 mcg daily  - SCD prophylaxis ICD  - ASA 81 mg daily  - Warfarin with Chromogenic factor 10 goal 20-40%     # Low Flow Alarms:  Given persistent LVAD alarms with low flows PI events with speed drops that are symptomatic with dizziness. RHC from 7/5 demonstrating euvolemia.  - No correlation with position or abnormal hemodynamics seen  - Repeat echocardiogram to assess LVAD position  - Repeat ICD interrogation     # CAD:  s/p STEMI with ALYSSA to LAD 6/27/18  - Continue PTA ASA, Crestor, and BB     # DM:  - Glargine 20u subcutaneous at bedtime  - ISS with carb coverage     # Paroxysmal Afib  CHADSVASC-4.   - Warfarin as above  - Continue PTA BB, amiodarone      # VT/VF:  With cardiac arrest.   - No recent ICD shocks     # LV thrombus:  - Coumadin as above    FEN: Cardiac diet  DVT Prophylaxis: Warfarin  Code Status: Prior  Dispo: Pending evaluation of frequent device alarms    Pt was seen and examined with Dr. Ortiz, who agrees with the assessment and plan.    Paola Chang MD  Cardiology Fellow      I have reviewed today's vital signs, notes, medications, labs and imaging. I have also seen and examined the patient and agree with the findings and plan as outlined above.    Jenni Ortiz MD  Section Head - Advanced Heart Failure, Transplantation and  "Mechanical Circulatory Support  Director - Adult Congenital and Cardiovascular Genetics Center  Associate Professor of Medicine, [facility]          Interval History   No major overnight events. Still having frequent PI events, but no flow alarms overnight.    ROS: A 4-point ROS was otherwise negative except as above.    Data reviewed today: I reviewed all new labs and imaging results over the last 24 hours. I personally reviewed:    Physical Exam   Temp: 98.7  F (37.1  C) Temp src: Oral BP: (!) 86/69 Pulse: 56 Heart Rate: 50 Resp: 10 SpO2: 98 % O2 Device: None (Room air)    Vitals:    07/05/19 1001 07/07/19 0400   Weight: 88.5 kg (195 lb) 87.3 kg (192 lb 7.4 oz)     Vital Signs with Ranges  Temp:  [97.9  F (36.6  C)-99  F (37.2  C)] 98.7  F (37.1  C)  Pulse:  [52-56] 56  Heart Rate:  [50-59] 50  Resp:  [8-20] 10  BP: ()/(66-93) 86/69  SpO2:  [94 %-98 %] 98 %  I/O last 3 completed shifts:  In: 1098 [P.O.:1080; I.V.:18]  Out: 1175 [Urine:1175]    Heart Rate: 50, Blood pressure (!) 86/69, pulse 56, temperature 98.7  F (37.1  C), temperature source Oral, resp. rate 10, height 1.626 m (5' 4\"), weight 87.3 kg (192 lb 7.4 oz), SpO2 98 %.  192 lbs 7.39 oz  GEN:  Alert, interactive, appears comfortable, NAD  CV:  LVAD hum, JVP ~ 6 cm  LUNGS: CTAB, no wheezes or crackles  ABD: Active bowel sounds, soft, non-tender/non-distended  EXT:  No edema or cyanosis  SKIN: Warm and dry, no lesions on exposed surfaces    Medications     Warfarin Therapy Reminder         amiodarone  200 mg Oral Daily     aspirin  81 mg Oral Daily     carvedilol  12.5 mg Oral BID w/meals     digoxin  125 mcg Oral Daily     famotidine  20 mg Oral BID     hydrALAZINE  100 mg Oral TID     insulin aspart  1-7 Units Subcutaneous TID AC     insulin aspart  1-5 Units Subcutaneous At Bedtime     insulin aspart   Subcutaneous TID w/meals     insulin glargine  20 Units Subcutaneous At Bedtime     lisinopril  7.5 mg Oral Daily     rosuvastatin  20 " mg Oral At Bedtime       Data   Recent Labs   Lab 07/07/19  0349 07/06/19  0355 07/05/19  1453   WBC 6.5 5.9 5.6   HGB 12.1* 12.2* 12.2*   MCV 87 87 88    200 199   INR 2.58* 3.41* 3.14*    140 140   POTASSIUM 3.9 3.8 4.1   CHLORIDE 107 109 108   CO2 24 24 24   BUN 26 29 32*   CR 1.49* 1.45* 1.55*   ANIONGAP 7 6 7   ARLENE 8.4* 8.6 8.3*   * 134* 200*   ALBUMIN  --   --  3.5   PROTTOTAL  --   --  6.7*   BILITOTAL  --   --  0.8   ALKPHOS  --   --  43   ALT  --   --  33   AST  --   --  29       Recent Results (from the past 24 hour(s))   XR Chest Port 1 View    Narrative    Exam: XR CHEST PORT 1 VW, 7/6/2019 10:16 AM    Indication: pa cath placement    Comparison: Chest x-ray 1/9/2019    Findings:   Frontal chest x-ray. Right chest wall cardiac device. Left ventricular  assist device. Right IJ [place]-Calvin catheter with tip projecting over  the right main pulmonary artery. No pneumothorax or large pleural  effusion. No acute airspace opacity. Unchanged cardiac mediastinal  silhouette.      Impression    Impression:   1. Right IJ [place]-Calvin catheter with tip projecting over the distal  right pulmonary artery.  2. No acute airspace opacity.    I have personally reviewed the examination and initial interpretation  and I agree with the findings.    MEGAN MENARD, DO

## 2019-07-07 NOTE — PLAN OF CARE
Neuro: A+Ox4  Cv: HR 45-55 overnight  CVP 6,6  PA 28/14, 24/8 sV02 58, 57 CO 3.9, 3.8  CI 1.9, 1.9  Resp: Ra, oxygen saturations WNL  Gi: WNL, bs 254 at bedtime-lantus and sliding scale per order  Gu: voiding WNL  Skin:WNL    P: Continue to monitor

## 2019-07-07 NOTE — PLAN OF CARE
D/I/A:   Up to chair independently. HR sinus muriel in the 50s. BP stable. No LVAD alarms. [place] 3.8-4, Flows 3-5, PI's 1.6-9. 1x BM., voiding adequately.   P: Transfer to 6C when bed becomes available.

## 2019-07-07 NOTE — PLAN OF CARE
LVAD Flow 3.4-4.4, PI 2.8-8.7, speed 5300, power 3.7-3.9. No alarms during shift. PI fluctuations not noted higher or lower in certain positions, but does swing high/low during and right after position changes.

## 2019-07-08 ENCOUNTER — APPOINTMENT (OUTPATIENT)
Dept: OCCUPATIONAL THERAPY | Facility: CLINIC | Age: 59
End: 2019-07-08
Attending: INTERNAL MEDICINE
Payer: COMMERCIAL

## 2019-07-08 ENCOUNTER — ANCILLARY PROCEDURE (OUTPATIENT)
Dept: CARDIOLOGY | Facility: CLINIC | Age: 59
End: 2019-07-08
Payer: COMMERCIAL

## 2019-07-08 VITALS
RESPIRATION RATE: 20 BRPM | WEIGHT: 194 LBS | BODY MASS INDEX: 33.12 KG/M2 | OXYGEN SATURATION: 98 % | TEMPERATURE: 98.1 F | HEIGHT: 64 IN | HEART RATE: 55 BPM | SYSTOLIC BLOOD PRESSURE: 103 MMHG | DIASTOLIC BLOOD PRESSURE: 66 MMHG

## 2019-07-08 LAB
ANION GAP SERPL CALCULATED.3IONS-SCNC: 6 MMOL/L (ref 3–14)
BASOPHILS # BLD AUTO: 0.1 10E9/L (ref 0–0.2)
BASOPHILS NFR BLD AUTO: 1.2 %
BUN SERPL-MCNC: 26 MG/DL (ref 7–30)
CALCIUM SERPL-MCNC: 8.6 MG/DL (ref 8.5–10.1)
CHLORIDE SERPL-SCNC: 106 MMOL/L (ref 94–109)
CO2 SERPL-SCNC: 23 MMOL/L (ref 20–32)
CREAT SERPL-MCNC: 1.5 MG/DL (ref 0.66–1.25)
DIFFERENTIAL METHOD BLD: ABNORMAL
DIGOXIN SERPL-MCNC: 1 UG/L (ref 0.5–2)
EOSINOPHIL # BLD AUTO: 0.4 10E9/L (ref 0–0.7)
EOSINOPHIL NFR BLD AUTO: 6.7 %
ERYTHROCYTE [DISTWIDTH] IN BLOOD BY AUTOMATED COUNT: 16.3 % (ref 10–15)
GFR SERPL CREATININE-BSD FRML MDRD: 51 ML/MIN/{1.73_M2}
GLUCOSE BLDC GLUCOMTR-MCNC: 160 MG/DL (ref 70–99)
GLUCOSE BLDC GLUCOMTR-MCNC: 170 MG/DL (ref 70–99)
GLUCOSE SERPL-MCNC: 155 MG/DL (ref 70–99)
HCT VFR BLD AUTO: 41.4 % (ref 40–53)
HGB BLD-MCNC: 13 G/DL (ref 13.3–17.7)
IMM GRANULOCYTES # BLD: 0 10E9/L (ref 0–0.4)
IMM GRANULOCYTES NFR BLD: 0.3 %
INR PPP: 2.03 (ref 0.86–1.14)
INTERPRETATION ECG - MUSE: NORMAL
LDH SERPL L TO P-CCNC: 134 U/L (ref 85–227)
LYMPHOCYTES # BLD AUTO: 1.2 10E9/L (ref 0.8–5.3)
LYMPHOCYTES NFR BLD AUTO: 19.6 %
MAGNESIUM SERPL-MCNC: 1.8 MG/DL (ref 1.6–2.3)
MCH RBC QN AUTO: 27.2 PG (ref 26.5–33)
MCHC RBC AUTO-ENTMCNC: 31.4 G/DL (ref 31.5–36.5)
MCV RBC AUTO: 87 FL (ref 78–100)
MONOCYTES # BLD AUTO: 0.7 10E9/L (ref 0–1.3)
MONOCYTES NFR BLD AUTO: 12.5 %
NEUTROPHILS # BLD AUTO: 3.5 10E9/L (ref 1.6–8.3)
NEUTROPHILS NFR BLD AUTO: 59.7 %
NRBC # BLD AUTO: 0 10*3/UL
NRBC BLD AUTO-RTO: 0 /100
PLATELET # BLD AUTO: 206 10E9/L (ref 150–450)
POTASSIUM SERPL-SCNC: 4 MMOL/L (ref 3.4–5.3)
RBC # BLD AUTO: 4.78 10E12/L (ref 4.4–5.9)
SODIUM SERPL-SCNC: 135 MMOL/L (ref 133–144)
WBC # BLD AUTO: 5.9 10E9/L (ref 4–11)

## 2019-07-08 PROCEDURE — 80162 ASSAY OF DIGOXIN TOTAL: CPT | Performed by: STUDENT IN AN ORGANIZED HEALTH CARE EDUCATION/TRAINING PROGRAM

## 2019-07-08 PROCEDURE — 99239 HOSP IP/OBS DSCHRG MGMT >30: CPT | Mod: 25 | Performed by: INTERNAL MEDICINE

## 2019-07-08 PROCEDURE — 97110 THERAPEUTIC EXERCISES: CPT | Mod: GO | Performed by: OCCUPATIONAL THERAPIST

## 2019-07-08 PROCEDURE — 83735 ASSAY OF MAGNESIUM: CPT | Performed by: STUDENT IN AN ORGANIZED HEALTH CARE EDUCATION/TRAINING PROGRAM

## 2019-07-08 PROCEDURE — 97535 SELF CARE MNGMENT TRAINING: CPT | Mod: GO | Performed by: OCCUPATIONAL THERAPIST

## 2019-07-08 PROCEDURE — 85025 COMPLETE CBC W/AUTO DIFF WBC: CPT | Performed by: STUDENT IN AN ORGANIZED HEALTH CARE EDUCATION/TRAINING PROGRAM

## 2019-07-08 PROCEDURE — 00000146 ZZHCL STATISTIC GLUCOSE BY METER IP

## 2019-07-08 PROCEDURE — 93282 PRGRMG EVAL IMPLANTABLE DFB: CPT | Mod: 26 | Performed by: INTERNAL MEDICINE

## 2019-07-08 PROCEDURE — 93282 PRGRMG EVAL IMPLANTABLE DFB: CPT

## 2019-07-08 PROCEDURE — 25000132 ZZH RX MED GY IP 250 OP 250 PS 637: Performed by: STUDENT IN AN ORGANIZED HEALTH CARE EDUCATION/TRAINING PROGRAM

## 2019-07-08 PROCEDURE — 83615 LACTATE (LD) (LDH) ENZYME: CPT | Performed by: STUDENT IN AN ORGANIZED HEALTH CARE EDUCATION/TRAINING PROGRAM

## 2019-07-08 PROCEDURE — 36415 COLL VENOUS BLD VENIPUNCTURE: CPT | Performed by: STUDENT IN AN ORGANIZED HEALTH CARE EDUCATION/TRAINING PROGRAM

## 2019-07-08 PROCEDURE — 25000132 ZZH RX MED GY IP 250 OP 250 PS 637: Performed by: INTERNAL MEDICINE

## 2019-07-08 PROCEDURE — 80048 BASIC METABOLIC PNL TOTAL CA: CPT | Performed by: STUDENT IN AN ORGANIZED HEALTH CARE EDUCATION/TRAINING PROGRAM

## 2019-07-08 PROCEDURE — 85610 PROTHROMBIN TIME: CPT | Performed by: STUDENT IN AN ORGANIZED HEALTH CARE EDUCATION/TRAINING PROGRAM

## 2019-07-08 RX ORDER — LISINOPRIL 10 MG/1
10 TABLET ORAL DAILY
Qty: 30 TABLET | Refills: 1
Start: 2019-07-08 | End: 2019-09-04 | Stop reason: DRUGHIGH

## 2019-07-08 RX ORDER — WARFARIN SODIUM 7.5 MG/1
7.5 TABLET ORAL
Status: DISCONTINUED | OUTPATIENT
Start: 2019-07-08 | End: 2019-07-08 | Stop reason: HOSPADM

## 2019-07-08 RX ADMIN — LISINOPRIL 10 MG: 10 TABLET ORAL at 07:44

## 2019-07-08 RX ADMIN — FAMOTIDINE 20 MG: 20 TABLET ORAL at 07:44

## 2019-07-08 RX ADMIN — DIGOXIN 125 MCG: 125 TABLET ORAL at 07:44

## 2019-07-08 RX ADMIN — AMIODARONE HYDROCHLORIDE 200 MG: 200 TABLET ORAL at 07:44

## 2019-07-08 RX ADMIN — HYDRALAZINE HYDROCHLORIDE 100 MG: 50 TABLET ORAL at 07:44

## 2019-07-08 RX ADMIN — ASPIRIN 81 MG CHEWABLE TABLET 81 MG: 81 TABLET CHEWABLE at 07:44

## 2019-07-08 RX ADMIN — CARVEDILOL 12.5 MG: 12.5 TABLET, FILM COATED ORAL at 07:44

## 2019-07-08 ASSESSMENT — ACTIVITIES OF DAILY LIVING (ADL)
ADLS_ACUITY_SCORE: 11

## 2019-07-08 ASSESSMENT — MIFFLIN-ST. JEOR: SCORE: 1621

## 2019-07-08 NOTE — PROGRESS NOTES
D: Stopped by to see patient. No VAD related questions or concerns at this time.  I: On VAD interrogation, patient had very frequent PI events with occasional speed drops (histroy only went back around 3 hours). Notified cards 2. Discussed POC and provided support and listened to patient's thoughts and concerns.  P: Continue to follow patient and address any questions or concerns patient may have. Patient likely discharging soon.

## 2019-07-08 NOTE — PLAN OF CARE
Discharge Planner OT   Patient plan for discharge: home  Current status: Pt ambulated 1,000 ft w/ no rest breaks and SBA throughout. Pt's PI, flow, power LVAD levels checked sporadically during ambulation and all WNL throughout. Pt's pre-vitals: /80, [id number],pt's post vitals:  BP 93/59, [id number].   Barriers to return to prior living situation: medical status  Recommendations for discharge: Home w/ OP CR  Rationale for recommendations: to increase ind in ADLS/IADLS       Entered by: Olga Dubon 07/08/2019 2:46 PM

## 2019-07-08 NOTE — DISCHARGE SUMMARY
[facility], [place]    Discharge Summary  Cardiology    Date of Admission:  7/5/2019  Date of Discharge:  7/8/2019  Discharging Provider: Jenni Ortiz MD    Discharge Diagnoses   # Frequent Left Ventricular Assist Device Alarms  # Chronic Systolic Heart Failure status-post left ventricular assist device  # Ischemic cardiomyopathy  # Coronary artery disease  # Diabetes Mellitus Type II  # Paroxysmal Atrial Fibrillation  # History of Ventricular Tachycardia  # Left ventricular thrombus    History of Present Illness   Mariusz Sharp is an 57 y/o M w/ ICM EF 20-25% s/p HeartMate 3 12/2018, CAD (STEMI s/p PCI LAD 6/27/2018), monomorphic VT s/p ICD, LV thrombus, CKD, pAfib, DM presenting with ongoing frequent LVAD alarms.    Hospital Course   Mariusz Sharp was admitted on 7/5/2019.  The following problems were addressed during his hospitalization:    # Low Flow / Frequent PI LVAD Alarms  Given persistent LVAD alarms with low flows and PI events with speed drops that are symptomatic with dizziness, Pt underwent RHC with leave-in [place] for HD monitoring. RHC from 7/5 demonstrating euvolemia. No correlation with position or abnormal hemodynamics seen on exercise activities or several days of monitoring. No arrhythmias on telemetry. Repeat echocardiogram shows LVAD in stable position with unchanged LVEF and similar septal position. Repeat ICD device interrogation shows no arrhythmias and <1% RV pacing.  - Follow-up with [facility] in 1 month  - Contact LVAD coordinators with major alarms    # Chronic systolic heart failure secondary to ICM s/p HM3 12/31/18  Stage C, NYHA Class IV. No signs of decompensation. RHC on 7/5 with normal biventricular filling pressures and cardiac output on LVAD support.   - ACEi/ARB: Increased PTA lisinopril to 10 mg PO QD  - Continued PTA Hydralazine 100 mg PO TID  - BB: Continued PTA Coreg 12.5 mg PO BID (persistently muriel at baseline)  - Continued PTA Digoxin 125  "mcg daily  - SCD prophylaxis: ICD  - Continued PTA ASA 81 mg daily  - Continued PTA Warfarin with Chromogenic factor 10 goal 20-40%     # CAD:  s/p STEMI with ALYSSA to LAD 6/27/18  - Continued PTA ASA, Crestor, and BB     # DM Type II:  Poorly controlled recently with HbA1c 9.5. Endocrinology consulted while inpt, recommend increased PTA insulin, but also request prior auth for [place] as outpt.  - Increased PTA insulin glargine to 24 units subcutaneous at bedtime (prior 20U)  - Increased PTA insulin aspart to 1 unit per 7 g of carbs and ongoing high sliding scale correction  - Follow-up with PCP to see if prior authorization can be completed with them otherwise follow-up with Endocrinology in 1 month as outpt     # Paroxysmal Afib  CHADSVASC-4.   - Warfarin as above  - Continue PTA BB, amiodarone      # VT/VF  With cardiac arrest.   - No recent ICD shocks per device check on 7/8     # LV Thrombus  - Warfarin as above    Pt was seen and discussed with staff attending, Dr. Ortiz, who agrees with the above.    Paola Chang MD  Cardiology Fellow    Significant Results and Procedures   TTE 7/7/19:  Interpretation Summary  HM 3 at 5300RPM.  Severely (EF 20-30%) reduced left ventricular function is present. LVIDd:4.6  cm. Septum midline.  Left ventricular size is normal.  Global right ventricular function is mildly reduced.  Moderate right ventricular dilation is present.  AV is not seen well, No AI.  Inflow velocity cannot be assessed. Outflow velocity is normal.  The inferior vena cava was normal in size with preserved respiratory  variability.  No pericardial effusion is present.  This study was compared with the study from 6/26/2019 .There has been no  change including RV function.    ICD Device Interrogation 7/8/19:  \"Patient seen on 4 E for evaluation and iterative programming of a [employer] Single lead ICD per MD orders.  Normal ICD function. No episodes/arrhythmias recorded. Intrinsic rhythm = SB @ 55 " "bpm.  = <1%. Estimated battery longevity to QUIQUE = 12 years. Plan for patient to return to clinic in September as scheduled. LESTER Bryant RN, BSN. Single lead ICDI have reviewed and interpreted the device interrogation, settings, programming and nurse's summary. The device is functioning within normal device parameters. I agree with the current findings, assessment and plan.\"    Pending Results   These results will be followed up by Dr. Ortiz.  Unresulted Labs Ordered in the Past 30 Days of this Admission     Date and Time Order Name Status Description    6/26/2019 0911 PRA SINGLE ANTIGEN IGG ANTIBODY In process           Code Status   Full Code    Primary Care Physician   Like He    Physical Exam   Temp: 98.1  F (36.7  C) Temp src: Oral BP: 103/66 Pulse: 55 Heart Rate: 56 Resp: 20 SpO2: 98 % O2 Device: None (Room air)    Vitals:    07/05/19 1001 07/07/19 0400 07/08/19 0400   Weight: 88.5 kg (195 lb) 87.3 kg (192 lb 7.4 oz) 88 kg (194 lb 0.1 oz)     Vital Signs with Ranges  Temp:  [97.2  F (36.2  C)-98.6  F (37  C)] 98.1  F (36.7  C)  Pulse:  [55] 55  Heart Rate:  [52-56] 56  Resp:  [9-20] 20  BP: ()/(61-87) 103/66  SpO2:  [97 %-98 %] 98 %  I/O last 3 completed shifts:  In: 1190 [P.O.:1160; I.V.:30]  Out: 1225 [Urine:1225]  GEN:  Alert, interactive, appears comfortable, NAD  CV:  LVAD hum, JVP ~ 6 cm  LUNGS: CTAB, no wheezes or crackles  ABD: Active bowel sounds, soft, non-tender/non-distended  EXT:  No edema or cyanosis  SKIN: Warm and dry, no lesions on exposed surfaces    Time Spent on this Encounter   I personally saw the patient today and spent greater than 30 minutes discharging this patient.    Discharge Disposition   Discharged to home  Condition at discharge: Stable    Consultations This Hospital Stay   CARDIAC REHAB IP CONSULT  NUTRITION SERVICES ADULT IP CONSULT  PHARMACY TO DOSE WARFARIN  ADVANCE DIRECTIVE IP CONSULT  VASCULAR ACCESS CARE ADULT IP CONSULT    Discharge Orders   No discharge " procedures on file.  Discharge Medications   Current Discharge Medication List      CONTINUE these medications which have NOT CHANGED    Details   amiodarone (PACERONE/CODARONE) 200 MG tablet Take 1 tablet (200 mg) by mouth daily  Qty: 90 tablet, Refills: 3    Associated Diagnoses: Acute systolic heart failure (H)      aspirin (ASA) 81 MG chewable tablet Take 1 tablet (81 mg) by mouth daily  Qty: 90 tablet, Refills: 3    Associated Diagnoses: LVAD (left ventricular assist device) present (H)      carvedilol (COREG) 12.5 MG tablet Take 1 tablet (12.5 mg) by mouth 2 times daily (with meals)  Qty: 60 tablet, Refills: 3    Comments: Dose, increase from 6.25 mg twice daily  Associated Diagnoses: LVAD (left ventricular assist device) present (H); Chronic systolic congestive heart failure (H)      digoxin (LANOXIN) 125 MCG tablet Take 1 tablet (125 mcg) by mouth daily  Qty: 90 tablet, Refills: 3    Associated Diagnoses: Acute systolic heart failure (H)      famotidine (PEPCID) 20 MG tablet Take 1 tablet (20 mg) by mouth 2 times daily  Qty: 180 tablet, Refills: 3    Associated Diagnoses: Acute on chronic systolic heart failure (H)      hydrALAZINE (APRESOLINE) 50 MG tablet Take 2 tablets (100 mg) by mouth 3 times daily  Qty: 90 tablet, Refills: 3    Associated Diagnoses: LVAD (left ventricular assist device) present (H); Chronic systolic congestive heart failure (H)      insulin glargine (LANTUS SOLOSTAR PEN) 100 UNIT/ML pen Inject 20 Units Subcutaneous At Bedtime      lisinopril (PRINIVIL/ZESTRIL) 2.5 MG tablet Take 3 tablets (7.5 mg) by mouth daily  Qty: 90 tablet, Refills: 11    Associated Diagnoses: LVAD (left ventricular assist device) present (H); Chronic systolic congestive heart failure (H); Chronic systolic heart failure (H)      rosuvastatin (CRESTOR) 20 MG tablet Take 1 tablet (20 mg) by mouth At Bedtime  Qty: 90 tablet, Refills: 3    Associated Diagnoses: Acute systolic heart failure (H)      warfarin (COUMADIN)  2.5 MG tablet Take two to three tablets daily or as directed by the Coumadin clinic  Qty: 150 tablet, Refills: 5    Comments: Per Anticoagulation protocol with Dr. Jenni Ortiz/Chasity Gonzalez RN  Associated Diagnoses: LVAD (left ventricular assist device) present (H); Heart failure (H)      insulin aspart (NOVOLOG PEN) 100 UNIT/ML pen Prandial Dosin units per 10 grams of carbohydrate each Meal or Snack.  Only chart total amount of units given.  Do not give if pre-prandial glucose is less than 60 mg/dL. If given at mealtime, administer within 30 minutes of start of meal.    Associated Diagnoses: Acute on chronic systolic heart failure (H)      nitroGLYcerin (NITROSTAT) 0.4 MG sublingual tablet Place 1 tablet (0.4 mg) under the tongue every 5 minutes as needed for chest pain For chest pain place 1 tablet under the tongue every 5 minutes for 3 doses. If symptoms persist 5 minutes after 1st dose call 911.  Qty: 10 tablet, Refills: 0    Associated Diagnoses: Acute on chronic systolic heart failure (H)         STOP taking these medications       acetaminophen (TYLENOL) 325 MG tablet Comments:   Reason for Stopping:         senna-docusate (SENOKOT-S/PERICOLACE) 8.6-50 MG tablet Comments:   Reason for Stopping:             Allergies   No Known Allergies  Data   Most Recent 3 CBC's:  Recent Labs   Lab Test 19  0327 19  0349 19  0355   WBC 5.9 6.5 5.9   HGB 13.0* 12.1* 12.2*   MCV 87 87 87    189 200      Most Recent 3 BMP's:  Recent Labs   Lab Test 19  0327 19  0349 19  0355    138 140   POTASSIUM 4.0 3.9 3.8   CHLORIDE 106 107 109   CO2 23 24 24   BUN 26 26 29   CR 1.50* 1.49* 1.45*   ANIONGAP 6 7 6   ARLENE 8.6 8.4* 8.6   * 184* 134*     Most Recent 2 LFT's:  Recent Labs   Lab Test 19  1453 19  0925   AST 29 30   ALT 33 32   ALKPHOS 43 50   BILITOTAL 0.8 1.1     Most Recent INR's and Anticoagulation Dosing History:  Anticoagulation Dose History      Recent Dosing and Labs Latest Ref Rng & Units 6/24/2019 6/26/2019 7/5/2019 7/5/2019 7/6/2019 7/7/2019 7/8/2019    Warfarin 7.5 mg - - - - - - 7.5 mg -    INR 0.86 - 1.14 2.9 2.58(H) 3.10(H) 3.14(H) 3.41(H) 2.58(H) 2.03(H)    INR-ISTAT 0.86 - 1.14 - - - - - - -    Chromogenic Factor 10 70 - 130 % - - - - - - -        Most Recent 3 Troponin's:  Recent Labs   Lab Test 12/29/18  1043 07/16/18  2001   TROPI 0.050* 1.460*     Most Recent Cholesterol Panel:  Recent Labs   Lab Test 08/06/18  1025   CHOL 126   LDL 24   HDL 52   TRIG 246*     Most Recent 6 Bacteria Isolates From Any Culture (See EPIC Reports for Culture Details):  Recent Labs   Lab Test 03/27/19  0910 02/11/19  1245 01/30/19  1255 01/23/19  1320   CULT <10,000 colonies/mL  urogenital simona  Susceptibility testing not routinely done   No growth No growth No growth     Most Recent TSH, T4 and A1c Labs:  Recent Labs   Lab Test 07/05/19  1453 06/26/19  0925  07/23/18  0645   TSH  --  3.94  --  6.91*   T4  --   --   --  1.05   A1C 9.5*  --    < >  --     < > = values in this interval not displayed.     Results for orders placed or performed during the hospital encounter of 07/05/19   XR Chest Port 1 View    Narrative    Exam: XR CHEST PORT 1 VW, 7/6/2019 10:16 AM    Indication: pa cath placement    Comparison: Chest x-ray 1/9/2019    Findings:   Frontal chest x-ray. Right chest wall cardiac device. Left ventricular  assist device. Right IJ [place]-Calvin catheter with tip projecting over  the right main pulmonary artery. No pneumothorax or large pleural  effusion. No acute airspace opacity. Unchanged cardiac mediastinal  silhouette.      Impression    Impression:   1. Right IJ [place]-Calvin catheter with tip projecting over the distal  right pulmonary artery.  2. No acute airspace opacity.    I have personally reviewed the examination and initial interpretation  and I agree with the findings.    MEGAN MENARD, DO   XR Chest Port 1 View    Narrative    Exam: XR CHEST PORT  1 VW, 7/7/2019 2:32 AM    Indication: pa cath placement    Comparison: Radiograph of the chest 7/6/2019    Findings:   AP view of the chest. Left chest wall AICD and left ventricular assist  device. Right IJ [place]-Calvin catheter tip projects over the proximal  right versus left pulmonary artery. The cardiomediastinal silhouette  is within normal limits. The pulmonary vasculature is distinct. No  focal airspace opacity. No pneumothorax or large pleural effusion. The  upper abdomen is unremarkable.       Impression    Impression:   Right IJ [place]-Calvin catheter tip projects over the proximal right  versus left pulmonary artery, retracted since prior.    I have personally reviewed the examination and initial interpretation  and I agree with the findings.    MEGAN MENARD, DO         I,Jenni Ortiz MD_, have seen and examined this patient. I have discussed with the team and agree with the findings and discharge plan. I have reviewed the hospital course with the patient and have spent 40 minutes in the process of discharge, including discharge planning with patient education, medication teaching, and the coordination of care to outpatient providers.  It has been a pleasure to participate in the care of your patient - please do not hesitate to contact me with any questions.    Jenni Ortiz MD  Section Head - Advanced Heart Failure, Transplantation and Mechanical Circulatory Support  Director - Adult Congenital and Cardiovascular Genetics Center  Associate Professor of Medicine, [facility]

## 2019-07-08 NOTE — CONSULTS
"Diabetes/Hyperglycemia Management Consult    Chief Complaint  Uncontrolled type 2 diabetes, listed for heart transplant  Consult requested by: Dr. Jenni Ortiz  History of Present Illness Mr. Vee \"Red\" Aron is a 57 yo man with a history of uncontrolled type 2 diabetes, STEMI s/p PCI on 6/27/18, ischemic cardiomyopathy s/p LVAD 12/2018, afib and CKD admitted 7/5/2019 with frequent LVAD alarms.  Diabetes management consult requested due to patient's uncontrolled glucose potentially jeopardizing transplant listing status.  \"Red\" is known to our service from last summer, prior to LVAD.  At that time he was started on prandial aspart and his basal insulin was reduced.  Test claim for liraglutide showed he did not have prescription coverage and a prior authorization was required.  He was comfortable carb counting, so discharged using insulin to carb ratio.  Never followed up with Diabetes Center at [facility] in [place].  Did establish with PCP, Dr. Irving, who manages his diabetes.  Doesn't know if there is a diabetes educator at his clinic. Red said he'd been trying to remember what that medication was that might help his diabetes (Victoza).  Red is feeling well.  Eating well enough. Up to walk the unit daily.  Feels his fluid balance is good. Could transfer to  if there was a bed.  He is to discharge soon, potentially today.  Has LVAD clinic appointment here at [facility] in one month.    Glucose has been 130s at the lowest (fasting) and up to low 200s post-prandially.  Pt states his eating and activity are about the same here as they are at home, but at home he forgets aspart injections 1-2 times per week.  His glucose values had been up to 300s several weeks ago and after admonishment from his PCP, he worked to improve them to 100s-200s over the last two weeks.      Recent Labs   Lab 07/08/19  1140 07/08/19  0747 07/08/19  0327 07/07/19  2219 07/07/19  1732 07/07/19  1307 07/07/19  1020 07/07/19  0349  " 07/06/19  0355  07/05/19  1453 07/05/19  0932   GLC  --   --  155*  --   --   --   --  184*  --  134*  --  200* 209*   * 160*  --  150* 231* 168* 200*  --    < >  --    < >  --   --     < > = values in this interval not displayed.         Diabetes Type:   Type 2 Diabetes  Diabetes Duration: 3 years since diagnosis  Usual Diabetes Regimen:   Checks glucose 3-4times/day  glargine 20 units at HS  aspart 1 unit per 10 grams carbohydrate  Cooks for himself, eats out a few times per week  Light activity- fishing, some housework inside    Ability to Alex Prescribed Regimen: fair.  Reports main barrier is forgetting Novolog pen when he goes out to eat  Diabetes Control: Running 135-250s at home in last two weeks  Lab Results   Component Value Date    A1C 9.5 07/05/2019    A1C 9.4 01/11/2019    A1C 10.1 12/30/2018    A1C 10.6 12/20/2018    A1C 8.6 10/17/2018        ESSENTIA Component Value Date   HGA1C 15.0 06/28/2018     Diabetes Complications: no microvascular complications known to pt. Last eye exam 12/2018  History of DKA: none  Able to Detect Hypoglycemia: Yes- one episode in past when BG was down to 50s- he was symptomatic- sweaty, shaky.  Usual Diabetes Care Provider: PCP Dr. Irving at [facility] in [place]  Factors Impacting Glucose Control: omitting prandial aspart, decreased insulin sensitivity      Review of Systems  10 point ROS completed with pertinent positives and negatives noted in the HPI    Past medical, family and social histories are reviewed and updated.    Past Medical History  Type 2 diabetes, CKD, cardiomyopathy    Family History  Type 2 diabetes- mother and father. None in his children    Social History  Social History     Social History     Marital status: Single     Spouse name: N/A     Number of children: N/A     Years of education: N/A     Social History Main Topics     Smoking status: Not on file     Smokeless tobacco: Not on file     Alcohol use Not on file     Drug use: Not on file      "Sexual activity: Not on file     Other Topics Concern     Not on file     Social History Narrative   Mariusz is on disabilty.  Formerly worked as a .      Physical Exam  /66 (BP Location: Left arm)   Pulse 55   Temp 98.1  F (36.7  C) (Oral)   Resp 20   Ht 1.626 m (5' 4\")   Wt 88 kg (194 lb 0.1 oz)   SpO2 98%   BMI 33.30 kg/m      General:  pleasant man, resting at edge of bed, in no distress.   HEENT: NC/AT, PER and anicteric, non-injected, oral mucous membranes moist.   Lungs: unlabored respiration, no cough  Skin: warm and dry, no obvious lesions  MSK:  fluid movement of all extremities  Lymp: no LE edema   Mental status:  alert, oriented x3, communicating clearly  Psych:  calm, even mood    Laboratory  Recent Labs   Lab Test 07/08/19  0327 07/07/19  0349    138   POTASSIUM 4.0 3.9   CHLORIDE 106 107   CO2 23 24   ANIONGAP 6 7   * 184*   BUN 26 26   CR 1.50* 1.49*   ARLENE 8.6 8.4*     CBC RESULTS:   Recent Labs   Lab Test 07/08/19  0327   WBC 5.9   RBC 4.78   HGB 13.0*   HCT 41.4   MCV 87   MCH 27.2   MCHC 31.4*   RDW 16.3*        Liver Function Studies -   Recent Labs   Lab Test 07/05/19  1453   PROTTOTAL 6.7*   ALBUMIN 3.5   BILITOTAL 0.8   ALKPHOS 43   AST 29   ALT 33       Active Medications  Current Facility-Administered Medications   Medication     acetaminophen (TYLENOL) tablet 650 mg     amiodarone (PACERONE/CODARONE) tablet 200 mg     aspirin (ASA) chewable tablet 81 mg     carvedilol (COREG) tablet 12.5 mg     glucose gel 15-30 g    Or     dextrose 50 % injection 25-50 mL    Or     glucagon injection 1 mg     digoxin (LANOXIN) tablet 125 mcg     famotidine (PEPCID) tablet 20 mg     hydrALAZINE (APRESOLINE) tablet 100 mg     insulin aspart (NovoLOG) inj (RAPID ACTING)     insulin aspart (NovoLOG) inj (RAPID ACTING)     insulin aspart (NovoLOG) inj (RAPID ACTING)     insulin aspart (NovoLOG) inj (RAPID ACTING)     insulin glargine (LANTUS PEN) injection 24 Units     " "lisinopril (PRINIVIL/ZESTRIL) tablet 10 mg     rosuvastatin (CRESTOR) tablet 20 mg     Warfarin Therapy Reminder (Check START DATE - warfarin may be starting in the FUTURE)     Current Outpatient Medications   Medication Sig Dispense Refill     insulin aspart (NOVOLOG PEN) 100 UNIT/ML pen Prandial Dosin units per 7 grams of carbohydrate each Meal or Snack.  Only chart total amount of units given.  Do not give if pre-prandial glucose is less than 60 mg/dL. If given at mealtime, administer within 30 minutes of start of meal. 3 mL 1     insulin glargine (LANTUS SOLOSTAR PEN) 100 UNIT/ML pen Inject 24 Units Subcutaneous At Bedtime 3 mL 11     lisinopril (PRINIVIL/ZESTRIL) 10 MG tablet Take 1 tablet (10 mg) by mouth daily 30 tablet 1       Current Diet  Orders Placed This Encounter      2 Gram Sodium Diet      Diet        Assessment  Mr. Vee \"Red\" Aron is a 57 yo man with a history of uncontrolled type 2 diabetes, STEMI s/p PCI on 18, ischemic cardiomyopathy s/p LVAD 2018, afib and CKD admitted 2019 with frequent LVAD alarms.  A1C has improved since he began taking insulin last year, but glucose not to target, which positions patient poorly for heart transplant.        Plan      -glargine dose increase to 24 units qPM starting this evening.  -aspart for meals and snacks increased to 1unit/7g CHO  -aspart for correction: continue high intensity ac and hs  -monitor glucose ac, hs and 0200  -liraglutide (Victoza) requires a prior authorization.     Recommend f/u with PCP shortly after discharge to initiate prior authorization for Victoza and follow up on DM with specialist within 4 weeks of discharge ([facility] versus [facility] in [place]).  Contact number for assistance schedule in diabetes clinic at [facility] pasted into AVS for pt reference.      Paolashemar MORALES -[phone]    Diabetes Management Team job code: [id number]  Reviewed with patient, pharmacist, and primary team.  I spent a total of 80 " minutes bedside and on the inpatient unit managing the glycemic care of Mariusz Sharp. Over 50% of my time on the unit was spent counseling the patient  and/or coordinating care regarding options to improve glucose control acutely and at home.  See note for details.

## 2019-07-08 NOTE — PLAN OF CARE
D/I/A:  A&Ox4, independent, walked in halls. Pacemaker interrogated this AM w/ no issues. No LVAD alarms. Insulin regimen modified. Discharge orders placed by Reji. AVS printed & discussed w/ patient. Educated pt on medication changes, demonstrated understanding. Follow-up appointments discussed.   P: Pt discharged to home & arranged for his own transportation.

## 2019-07-08 NOTE — PLAN OF CARE
VSS (HR 50s) - on RA. LVAD numbers: power 3.8-4, flow 3.5-4.5, PI's 4.5-6. No LVAD alarms. Pt up independently.       Problem: Adult Inpatient Plan of Care  Goal: Plan of Care Review  Outcome: No Change

## 2019-07-08 NOTE — PROGRESS NOTES
Indication of Interrogation:  Heart failure, eval hemodynamic fxn, flows/PI    Type of VAD:  Heartmate 3    Current Parameters:  Flow= 4.1 lpm, Speed= 5300 rpm, Power= 3.8 gamez, PI (if applicable)= 6.7    Abnormal Alarm on History:  No    Abnormal Events/Parameters Notes:  Yes, explain: Very frequent PI events with occasional speed drops (history only went back about 3 hrs). Notified Cards 2 team.    Changes Made during Interrogation:  No

## 2019-07-08 NOTE — DISCHARGE INSTRUCTIONS
DIABETES Plan  Improve average glucose to mid 100s = A1C 7-7.5%  Current A1C 9.5% = average glucose 224     -glargine dose increase to 24 units every night starting this evening.  -aspart for meals and snacks increase to 1unit/7grams carbohydrate  -aspart for correction: continue high intensity   -monitor glucose before meals and at bedtime  -liraglutide (Victoza) requires a prior authorization.     Recommend f/u with PCP Dr. Irving shortly after discharge to initiate prior authorization for Victoza (starting dose 0.6mg daily) and follow up on DM with specialist within 4 weeks of discharge ([facility] versus [facility] in [place]).     Diabetes follow-up: Call [facility] coordinator: [phone], to schedule your outpatient diabetes appointment.  Recommend you be seen 4 weeks from discharge.  Ask for appt for same day you return to LVAD clnic.

## 2019-07-09 ENCOUNTER — ANTICOAGULATION THERAPY VISIT (OUTPATIENT)
Dept: ANTICOAGULATION | Facility: CLINIC | Age: 59
End: 2019-07-09

## 2019-07-09 ENCOUNTER — CARE COORDINATION (OUTPATIENT)
Dept: CARDIOLOGY | Facility: CLINIC | Age: 59
End: 2019-07-09

## 2019-07-09 DIAGNOSIS — Z95.811 LVAD (LEFT VENTRICULAR ASSIST DEVICE) PRESENT (H): ICD-10-CM

## 2019-07-09 DIAGNOSIS — I50.9 HEART FAILURE (H): ICD-10-CM

## 2019-07-09 NOTE — PROGRESS NOTES
Called patient/caregiver to check in 1 day post discharge. Pt reports VAD parameters stable, however pt did have 3 short low flow alarms this morning. Pt was asymptomatic and denied any lightheadedness or dizziness. Weight stable. Reviewed medications and answered any questions. Patient reports sleeping well and low anxiety since being home with LVAD. Patient is able to move around the house and care for himself independently.    Discussed specific new problems/stressors since being discharged from the hospital: discussed pt's Hgb A1C being high.  This writer strongly recommended that patient makes an appointment to see his primary physician who manages his diabetes.  Patient stated that he would.  Pt stated that remembering to take his insulin is his biggest barrier.  Reinforced that importance of getting his hgbA1C lower and discussed how well-controlled diabetes is a requirement in order to stay eligible for a heart transplant. Pt verbalized understanding.  Empathized with patient and reviewed coping strategies: enlisting support from friends and love ones, attending patient and caregiver support groups, reviewing LVAD educational materials to reinforce knowledge, and talking about concerns with family/care providers/trusted others. Encouraged pt to page VAD Coordinator with any issues or questions. Pt verbalizes understanding. Pt will RTC in one month.

## 2019-07-09 NOTE — PROGRESS NOTES
Dates of hospitalization: 7/5 to 7/8  Reason for hospitalization: Frequent LVAD Alarms  Procedures performed: 7/5 Rt heart cath with leave-in swan for HD monitoring and ICD device interrogation  Vitamin K or FFP administered? No  INR Goal Range Confirmed to be:2.0-3.0  Inpatient warfarin doses added to calendar? Yes  Medication changes at discharge: Continue with Amiodarone 200mg Daily, and ASA 81mg Daily  Warfarin dosing after DC: Pt reports he just went back to 7.5mg MWF and 5mg TuThSaSu, but pt reports he took a 5mg dose last night instead  Patient discharged on Lovenox? No  Next INR date: Wednesday 7/10 or Thursday 7/11  Where is the patient discharging to? (home, TCU, staying locally, etc.): Home   Will patient have home care? No    Discharge note was incomplete and pt reports that he just went back to his previous maintenance dose, but was not told when to retest an INR.    An in-basket message sent to Nedra LVAD Coordinator to verify that we are to tests pt's INR only. In the incomplete discharge notes there was a notation that we are to test pt's Chromogenic X, but while pt was inpatient this test was not done. Will await for Nedra's response.     Addendum 7/9/19 Received an in-basket message from [facility] LVAD Coordinator reporting that we are to follow INR's only and no Chromogenic X's. Chasity Gonzalez RN

## 2019-07-10 ENCOUNTER — ANTICOAGULATION THERAPY VISIT (OUTPATIENT)
Dept: ANTICOAGULATION | Facility: CLINIC | Age: 59
End: 2019-07-10

## 2019-07-10 DIAGNOSIS — Z95.811 LVAD (LEFT VENTRICULAR ASSIST DEVICE) PRESENT (H): ICD-10-CM

## 2019-07-10 DIAGNOSIS — I50.9 HEART FAILURE (H): ICD-10-CM

## 2019-07-10 LAB
INR PPP: 2.5
MDC_IDC_LEAD_IMPLANT_DT: NORMAL
MDC_IDC_LEAD_IMPLANT_DT: NORMAL
MDC_IDC_LEAD_LOCATION: NORMAL
MDC_IDC_LEAD_LOCATION: NORMAL
MDC_IDC_LEAD_LOCATION_DETAIL_1: NORMAL
MDC_IDC_LEAD_LOCATION_DETAIL_1: NORMAL
MDC_IDC_LEAD_MFG: NORMAL
MDC_IDC_LEAD_MFG: NORMAL
MDC_IDC_LEAD_MODEL: NORMAL
MDC_IDC_LEAD_MODEL: NORMAL
MDC_IDC_LEAD_POLARITY_TYPE: NORMAL
MDC_IDC_LEAD_POLARITY_TYPE: NORMAL
MDC_IDC_LEAD_SERIAL: NORMAL
MDC_IDC_LEAD_SERIAL: NORMAL
MDC_IDC_LEAD_SPECIAL_FUNCTION: NORMAL
MDC_IDC_LEAD_SPECIAL_FUNCTION: NORMAL
MDC_IDC_MSMT_BATTERY_DTM: NORMAL
MDC_IDC_MSMT_BATTERY_STATUS: NORMAL
MDC_IDC_MSMT_CAP_CHARGE_DTM: NORMAL
MDC_IDC_MSMT_CAP_CHARGE_ENERGY: 41 J
MDC_IDC_MSMT_CAP_CHARGE_TIME: 8.39
MDC_IDC_MSMT_CAP_CHARGE_TIME: 9.28
MDC_IDC_MSMT_CAP_CHARGE_TYPE: NORMAL
MDC_IDC_MSMT_CAP_CHARGE_TYPE: NORMAL
MDC_IDC_MSMT_LEADCHNL_RV_IMPEDANCE_VALUE: 974 OHM
MDC_IDC_MSMT_LEADCHNL_RV_PACING_THRESHOLD_AMPLITUDE: 0.9 V
MDC_IDC_MSMT_LEADCHNL_RV_PACING_THRESHOLD_PULSEWIDTH: 1 MS
MDC_IDC_MSMT_LEADCHNL_RV_SENSING_INTR_AMPL: 7.7 MV
MDC_IDC_PG_IMPLANT_DTM: NORMAL
MDC_IDC_PG_MFG: NORMAL
MDC_IDC_PG_MODEL: NORMAL
MDC_IDC_PG_SERIAL: NORMAL
MDC_IDC_PG_TYPE: NORMAL
MDC_IDC_SESS_CLINIC_NAME: NORMAL
MDC_IDC_SESS_DTM: NORMAL
MDC_IDC_SESS_TYPE: NORMAL
MDC_IDC_SET_BRADY_HYSTRATE: NORMAL
MDC_IDC_SET_BRADY_LOWRATE: 40 {BEATS}/MIN
MDC_IDC_SET_BRADY_MODE: NORMAL
MDC_IDC_SET_LEADCHNL_RV_PACING_AMPLITUDE: 2.2 V
MDC_IDC_SET_LEADCHNL_RV_PACING_ANODE_ELECTRODE_1: NORMAL
MDC_IDC_SET_LEADCHNL_RV_PACING_ANODE_LOCATION_1: NORMAL
MDC_IDC_SET_LEADCHNL_RV_PACING_CAPTURE_MODE: NORMAL
MDC_IDC_SET_LEADCHNL_RV_PACING_CATHODE_ELECTRODE_1: NORMAL
MDC_IDC_SET_LEADCHNL_RV_PACING_CATHODE_LOCATION_1: NORMAL
MDC_IDC_SET_LEADCHNL_RV_PACING_POLARITY: NORMAL
MDC_IDC_SET_LEADCHNL_RV_PACING_PULSEWIDTH: 1 MS
MDC_IDC_SET_LEADCHNL_RV_SENSING_ADAPTATION_MODE: NORMAL
MDC_IDC_SET_LEADCHNL_RV_SENSING_ANODE_ELECTRODE_1: NORMAL
MDC_IDC_SET_LEADCHNL_RV_SENSING_ANODE_LOCATION_1: NORMAL
MDC_IDC_SET_LEADCHNL_RV_SENSING_CATHODE_ELECTRODE_1: NORMAL
MDC_IDC_SET_LEADCHNL_RV_SENSING_CATHODE_LOCATION_1: NORMAL
MDC_IDC_SET_LEADCHNL_RV_SENSING_POLARITY: NORMAL
MDC_IDC_SET_LEADCHNL_RV_SENSING_SENSITIVITY: 0.6 MV
MDC_IDC_SET_ZONE_DETECTION_INTERVAL: 300 MS
MDC_IDC_SET_ZONE_DETECTION_INTERVAL: 375 MS
MDC_IDC_SET_ZONE_TYPE: NORMAL
MDC_IDC_SET_ZONE_VENDOR_TYPE: NORMAL
MDC_IDC_STAT_BRADY_RV_PERCENT_PACED: 1 %
MDC_IDC_STAT_EPISODE_RECENT_COUNT: 0
MDC_IDC_STAT_EPISODE_RECENT_COUNT_DTM_END: NORMAL
MDC_IDC_STAT_EPISODE_RECENT_COUNT_DTM_START: NORMAL
MDC_IDC_STAT_EPISODE_TOTAL_COUNT: 0
MDC_IDC_STAT_EPISODE_TOTAL_COUNT: 49
MDC_IDC_STAT_EPISODE_TOTAL_COUNT: 6
MDC_IDC_STAT_EPISODE_TOTAL_COUNT_DTM_END: NORMAL
MDC_IDC_STAT_EPISODE_TYPE: NORMAL
MDC_IDC_STAT_EPISODE_VENDOR_TYPE: NORMAL
MDC_IDC_STAT_TACHYTHERAPY_ATP_DELIVERED_RECENT: 0
MDC_IDC_STAT_TACHYTHERAPY_ATP_DELIVERED_TOTAL: 9
MDC_IDC_STAT_TACHYTHERAPY_RECENT_DTM_END: NORMAL
MDC_IDC_STAT_TACHYTHERAPY_RECENT_DTM_START: NORMAL
MDC_IDC_STAT_TACHYTHERAPY_SHOCKS_ABORTED_RECENT: 0
MDC_IDC_STAT_TACHYTHERAPY_SHOCKS_ABORTED_TOTAL: 0
MDC_IDC_STAT_TACHYTHERAPY_SHOCKS_DELIVERED_RECENT: 0
MDC_IDC_STAT_TACHYTHERAPY_SHOCKS_DELIVERED_TOTAL: 5
MDC_IDC_STAT_TACHYTHERAPY_TOTAL_DTM_END: NORMAL

## 2019-07-10 NOTE — PROGRESS NOTES
ANTICOAGULATION FOLLOW-UP CLINIC VISIT    Patient Name:  Mariusz Sharp  Date:  7/10/2019  Contact Type:  Telephone    SUBJECTIVE:         OBJECTIVE    INR   Date Value Ref Range Status   07/10/2019 2.5  Final     Chromogenic Factor 10   Date Value Ref Range Status   2019 72 70 - 130 % Final     Comment:     Therapeutic Range:  A Chromogenic Factor 10 level of approximately 20-40%   inversely correlates with an INR of 2-3 for patients receiving Warfarin.   Chromogenic Factor 10 levels below 20% indicate an INR greater than 3 and   levels above 40% indicate an INR less than 2.         ASSESSMENT / PLAN  No question data found.  Anticoagulation Summary  As of 7/10/2019    INR goal:   2.0-3.0   TTR:   75.8 % (5.5 mo)   INR used for dosin.5 (7/10/2019)   Warfarin maintenance plan:   7.5 mg (5 mg x 1.5) every Mon, Wed, Fri; 5 mg (5 mg x 1) all other days   Full warfarin instructions:   7.5 mg every Mon, Wed, Fri; 5 mg all other days   Weekly warfarin total:   42.5 mg   Plan last modified:   Sophy Griggs RN (2019)   Next INR check:   2019   Priority:   INR   Target end date:   Indefinite    Indications    Heart failure (H) [I50.9]  LVAD (left ventricular assist device) present (H) [Z95.811]             Anticoagulation Episode Summary     INR check location:       Preferred lab:       Send INR reminders to:   [facility]    Comments:   LVAD implanted 18  ASA 81mg Daily Has 2.5mg tablets   [facility] afe=[phone].  Faxed S.O there on 2019 Follow INR's only no Chromogenic X's      Anticoagulation Care Providers     Provider Role Specialty Phone number    Jenni Ortiz MD Responsible Cardiology [phone]            See the Encounter Report to view Anticoagulation Flowsheet and Dosing Calendar (Go to Encounters tab in chart review, and find the Anticoagulation Therapy Visit)    Left message for patient with results and dosing recommendations. Asked patient to  call back to report any missed doses, falls, signs and symptoms of bleeding or clotting, any changes in health, medication, or diet. Asked patient to call back with any questions or concerns.     Sophy Griggs RN

## 2019-07-10 NOTE — PLAN OF CARE
Occupational Therapy Discharge Summary    Reason for therapy discharge:    Discharged to home with outpatient therapy.    Progress towards therapy goal(s). See goals on Care Plan in [facility] electronic health record for goal details.  Goals partially met.  Barriers to achieving goals:   discharge from facility.    Therapy recommendation(s):    Continued therapy is recommended.  Rationale/Recommendations:  CR to build cardiovascular function. .

## 2019-07-17 ENCOUNTER — ANTICOAGULATION THERAPY VISIT (OUTPATIENT)
Dept: ANTICOAGULATION | Facility: CLINIC | Age: 59
End: 2019-07-17

## 2019-07-17 DIAGNOSIS — Z95.811 LVAD (LEFT VENTRICULAR ASSIST DEVICE) PRESENT (H): ICD-10-CM

## 2019-07-17 DIAGNOSIS — I50.9 HEART FAILURE (H): ICD-10-CM

## 2019-07-17 LAB — INR PPP: 2.9

## 2019-07-17 NOTE — PROGRESS NOTES
ANTICOAGULATION FOLLOW-UP CLINIC VISIT    Patient Name:  Mariusz Sharp  Date:  7/17/2019  Contact Type:  Telephone    SUBJECTIVE:  Patient Findings     Comments:   Lab personnel from [facility] in [place] calls with the INR result today.  Pt leaves early AM of 7/22 for a trip to [facility] [place] for ~ 2 weeks - he is driving.  Writer desires to check the INR sooner than 1 week, but after considerable discussion, this will be quite difficult given his plan for driving & uncertainty re: the towns he will be stopping in.  A slight decrease in dose is given today only.  Pt will call us on 7/24 after he arrives in [place] with a location of where he will get the INR done & we will fax orders to that facility.  He is encouraged to contact us at any time while traveling if questions arise.         Clinical Outcomes     Comments:   Lab personnel from [facility] calls with the INR result today.  Pt leaves early AM of 7/22 for a trip to [facility] for ~ 2 weeks - he is driving.  Writer desires to check the INR sooner than 1 week, but after considerable discussion, this will be quite difficult given his plan for driving & uncertainty re: the towns he will be stopping in.  A slight decrease in dose is given today only.  Pt will call us on 7/24 after he arrives in [place] with a location of where he will get the INR done & we will fax orders to that facility.  He is encouraged to contact us at any time while traveling if questions arise.            OBJECTIVE    INR   Date Value Ref Range Status   07/17/2019 2.9  Final     Chromogenic Factor 10   Date Value Ref Range Status   01/11/2019 72 70 - 130 % Final     Comment:     Therapeutic Range:  A Chromogenic Factor 10 level of approximately 20-40%   inversely correlates with an INR of 2-3 for patients receiving Warfarin.   Chromogenic Factor 10 levels below 20% indicate an INR greater than 3 and   levels above 40% indicate an INR less than 2.         ASSESSMENT / PLAN  INR assessment THER     Recheck INR In: 1 WEEK    INR Location Outside lab      Anticoagulation Summary  As of 2019    INR goal:   2.0-3.0   TTR:   76.8 % (5.7 mo)   INR used for dosin.9 (2019)   Warfarin maintenance plan:   7.5 mg (5 mg x 1.5) every Mon, Wed, Fri; 5 mg (5 mg x 1) all other days   Full warfarin instructions:   : 6.25 mg; Otherwise 7.5 mg every Mon, Wed, Fri; 5 mg all other days   Weekly warfarin total:   42.5 mg   Plan last modified:   Brenda Alford RN (2019)   Next INR check:   2019   Priority:   INR   Target end date:   Indefinite    Indications    Heart failure (H) [I50.9]  LVAD (left ventricular assist device) present (H) [Z95.811]             Anticoagulation Episode Summary     INR check location:       Preferred lab:       Send INR reminders to:   [facility]    Comments:   LVAD implanted 18  ASA 81mg Daily Has 2.5mg tablets   [facility] cvp=[phone].  Faxed S.O there on 2019 Follow INR's only no Chromogenic X's      Anticoagulation Care Providers     Provider Role Specialty Phone number    Jenni Ortiz MD Responsible Cardiology [phone]            See the Encounter Report to view Anticoagulation Flowsheet and Dosing Calendar (Go to Encounters tab in chart review, and find the Anticoagulation Therapy Visit)    See above notation     Brenda Alford RN

## 2019-07-19 ENCOUNTER — TELEPHONE (OUTPATIENT)
Dept: CARDIOLOGY | Facility: CLINIC | Age: 59
End: 2019-07-19

## 2019-07-19 ENCOUNTER — TELEPHONE (OUTPATIENT)
Dept: TRANSPLANT | Facility: CLINIC | Age: 59
End: 2019-07-19

## 2019-07-19 NOTE — TELEPHONE ENCOUNTER
[facility] Call Center    Phone Message    May a detailed message be left on voicemail: yes    Reason for Call: Form or Letter   Type or form/letter needing completion: Letter stating it is okay for Mariusz to take a 4 day trip to [place] (driving)   Provider: Jenni Ortiz  Date form needed: today 07/19 as soon as possible   Once completed: Please call him back and he'll provide with fax#       Action Taken: Message routed to:  [facility]

## 2019-07-19 NOTE — TELEPHONE ENCOUNTER
[facility] Call Center    Phone Message    May a detailed message be left on voicemail: yes    Reason for Call: Other: Patient called back with the fax number, [phone].  He wants Dr. Ortiz to know that he will not be doing this in one day, they will be making stops throughout the trip and nightly stops, sleeping in hotels, he said it should take about 3 to 4 days for him to get out to [place] and then up to [place].  He is requesting this be done today, ASAP, as he needs to bring it to the  today by 4:00.       Action Taken: Message routed to:  [facility] ([facility]): Cardiology

## 2019-07-19 NOTE — LETTER
July 25, 2019    Mariusz Sharp  [address] [facility] [facility] [address]      Dear Mr. Sharp,    This letter is sent to notify you that your listing status was changed from Status 4 to Status 7 on the heart transplant waitlist at the [facility].  Your status was changed because of travel.    During this waiting period, we may still request periodic laboratory tests with your primary physician.  It will be your responsibility to remind your physician to forward your results to the Transplant Office.    We still need to be kept informed of any changes such as:    o changes in your insurance coverage  o change in your phone number, address or emergency contact  o significant changes in your health  o significant infections (such as pneumonia or abscesses)  o blood transfusions  o any condition which requires hospitalization or surgery    Sincerely,        Heart Transplant Program    Enclosures: UNOS Letter

## 2019-07-19 NOTE — TELEPHONE ENCOUNTER
Pt left  on Jul 19, 2019 1:25:53 PM stating that hell be on a vacation for the next 2 weeks leaving on Monday and needs to be taking off the list for that time. Please connect.

## 2019-07-21 NOTE — PROGRESS NOTES
Care Coordinator Progress Note    Admission Date/Time:  7/16/2018  Attending MD:  Jenni Ortiz MD  Data  Chart reviewed, discussed with interdisciplinary team.   Patient with h/o recent STEMI (6/27) s/p PCI with ALYSSA to ostial LAD, type 2 DM and CKD stage 3 who was readmitted to [facility] on 7/2 with VT s/p DCCV, the admission was complicated by cardiogenic shock with new LVEF 15% (see more details below); transferred to [facility] on 7/16/18 for further management.      Concerns with insurance coverage for discharge needs: None stated by pt.  Current Living Situation: Patient said that he will be discharging to his friend's home: Lisa Momin, [address] [facility] [place] [address].   Support System: Supportive and Involved friend and family.   Services Involved: none currently.   Transportation at Discharge: Family or friend will provide  Transportation to Medical Appointments:   - Name of caregiver: Lisa Momin  Barriers to Discharge: medical condition.     Coordination of Care and Referrals: Provided patient/family with options for outpt INR and Warfarin monitoring and new PCP.    Assessment  Per MD, pt will need outpt INR and Warfarin monitoring arranged; pt is in agreement with this plan. Pt said that he wants to see a new PCP at the [facility].   Intervention:   Arrangements made with the [facility] (Ph: [phone] Fax: [phone]) for INR and Warfarin monitoring (Goal=2-2.5). Indication for Anticoagulation: Atrial Fibrillation and LV Thrombus. Expected duration of therapy: Lifetime. Dr. Patricia Irving to follow. Appointment made with Dr. Patricia Irving on 7/26/18 at 8:20 AM for INR lab draw, referral to INR nurse, and establishment of care.    Plan  Anticipated Discharge Date:  7/24/18  Anticipated Discharge Plan:  Discharge to home with outpt f/u.     BRIAN PRAKER RN BSN  Care Coordinator Unit   [phone]         [id number]

## 2019-07-22 ENCOUNTER — CARE COORDINATION (OUTPATIENT)
Dept: CARDIOLOGY | Facility: CLINIC | Age: 59
End: 2019-07-22

## 2019-07-22 NOTE — PROGRESS NOTES
Patient left  stating that he is leaving today (7/22/19) and will be driving to [place] and [place].  This writer returned call.  Reminded patient to notify VAD Coordinator a least a couple weeks in advance when traveling in the future. Pt verbalized understanding. Also reminded patient to call VAD coordinator on-call rather than VAD Coordinator desk phone for urgent needs; pt verbalized understanding.   Patient stated that he has all of his equipment, medications and dressing supplies.  Stated that he will be driving to [place] and then [place] and will be at each location for a few days but doesn't know exact itinerary yet. Stated that he will return to [place] around 8/2/19.  This writer is sending pt VAD hospitals in [place] and [place] via [employer] message for pt safety. Also instructed patient to call [facility] VAD Coordinator on-call with any issues. Pt verbalized understanding.

## 2019-07-24 NOTE — TELEPHONE ENCOUNTER
Status Change-OS 07/23/2019    Changed patient's listing status in UNOS from status 4 to status 7 for the following reasons:  Patient is traveling.  Spoke to patient on 7-22-19 and he is to call transplant coordinator when he is back in town.       Patient aware of change and letter sent per regulatory guidelines, primary cardiology team aware.

## 2019-08-02 DIAGNOSIS — I50.22 CHRONIC SYSTOLIC CONGESTIVE HEART FAILURE (H): Primary | ICD-10-CM

## 2019-08-02 NOTE — TELEPHONE ENCOUNTER
Pt left a VM 8/2/19 at 2:44pm.    Pt wanted to let his care team know he was back in town and would like to connect when available

## 2019-08-06 ENCOUNTER — TELEPHONE (OUTPATIENT)
Dept: TRANSPLANT | Facility: CLINIC | Age: 59
End: 2019-08-06

## 2019-08-06 NOTE — LETTER
August 6, 2019    Mariusz Sharp  [address] [facility] [address]      Dear Mr. Sharp,    This letter is sent to notify you that your listing status was changed from Status 7 to Status 4 on the heart transplant waitlist at the [facility].    During this waiting period, we may still request periodic laboratory tests with your primary physician.  It will be your responsibility to remind your physician to forward your results to the Transplant Office.    We still need to be kept informed of any changes such as:    o changes in your insurance coverage  o change in your phone number, address or emergency contact  o significant changes in your health  o significant infections (such as pneumonia or abscesses)  o blood transfusions  o any condition which requires hospitalization or surgery    Sincerely,        Heart Transplant Program    Enclosures: UNOS Letter

## 2019-08-06 NOTE — TELEPHONE ENCOUNTER
Transplant Update 08/06/19:  Reason For Call:  Listing (re-activation)    Spoke to patient today and discussed the following information:    Received message from patient that he was back from his travels and ready to be reactivated on the waitlist.      Status Change-OS 08/06/19    Changed patient's listing status in OS from 7 to 4 for the following reasons:  Patient has returned from his travels     Patient aware of change and letter sent per regulatory guidelines, primary cardiology team aware.      Status 4 Extension Due 11/04/2019

## 2019-08-07 ENCOUNTER — OFFICE VISIT (OUTPATIENT)
Dept: CARDIOLOGY | Facility: CLINIC | Age: 59
End: 2019-08-07
Attending: INTERNAL MEDICINE
Payer: COMMERCIAL

## 2019-08-07 ENCOUNTER — ANTICOAGULATION THERAPY VISIT (OUTPATIENT)
Dept: ANTICOAGULATION | Facility: CLINIC | Age: 59
End: 2019-08-07

## 2019-08-07 VITALS
BODY MASS INDEX: 35.68 KG/M2 | OXYGEN SATURATION: 96 % | HEART RATE: 62 BPM | WEIGHT: 209 LBS | HEIGHT: 64 IN | SYSTOLIC BLOOD PRESSURE: 100 MMHG

## 2019-08-07 DIAGNOSIS — Z94.1 HEART REPLACED BY TRANSPLANT (H): ICD-10-CM

## 2019-08-07 DIAGNOSIS — I50.9 HEART FAILURE (H): ICD-10-CM

## 2019-08-07 DIAGNOSIS — I50.22 CHRONIC SYSTOLIC CONGESTIVE HEART FAILURE (H): ICD-10-CM

## 2019-08-07 DIAGNOSIS — Z95.811 LVAD (LEFT VENTRICULAR ASSIST DEVICE) PRESENT (H): ICD-10-CM

## 2019-08-07 DIAGNOSIS — Z95.811 LVAD (LEFT VENTRICULAR ASSIST DEVICE) PRESENT (H): Primary | ICD-10-CM

## 2019-08-07 LAB
ALBUMIN SERPL-MCNC: 3.5 G/DL (ref 3.4–5)
ALP SERPL-CCNC: 51 U/L (ref 40–150)
ALT SERPL W P-5'-P-CCNC: 43 U/L (ref 0–70)
ANION GAP SERPL CALCULATED.3IONS-SCNC: 6 MMOL/L (ref 3–14)
AST SERPL W P-5'-P-CCNC: 34 U/L (ref 0–45)
BILIRUB SERPL-MCNC: 0.8 MG/DL (ref 0.2–1.3)
BUN SERPL-MCNC: 25 MG/DL (ref 7–30)
CALCIUM SERPL-MCNC: 8.2 MG/DL (ref 8.5–10.1)
CHLORIDE SERPL-SCNC: 107 MMOL/L (ref 94–109)
CO2 SERPL-SCNC: 25 MMOL/L (ref 20–32)
CREAT SERPL-MCNC: 1.82 MG/DL (ref 0.66–1.25)
D DIMER PPP FEU-MCNC: 0.6 UG/ML FEU (ref 0–0.5)
ERYTHROCYTE [DISTWIDTH] IN BLOOD BY AUTOMATED COUNT: 14.6 % (ref 10–15)
GFR SERPL CREATININE-BSD FRML MDRD: 40 ML/MIN/{1.73_M2}
GLUCOSE SERPL-MCNC: 261 MG/DL (ref 70–99)
HCT VFR BLD AUTO: 39.5 % (ref 40–53)
HGB BLD-MCNC: 13.1 G/DL (ref 13.3–17.7)
INR PPP: 2.71 (ref 0.86–1.14)
LDH SERPL L TO P-CCNC: 146 U/L (ref 85–227)
MCH RBC QN AUTO: 29.7 PG (ref 26.5–33)
MCHC RBC AUTO-ENTMCNC: 33.2 G/DL (ref 31.5–36.5)
MCV RBC AUTO: 90 FL (ref 78–100)
PLATELET # BLD AUTO: 243 10E9/L (ref 150–450)
POTASSIUM SERPL-SCNC: 4 MMOL/L (ref 3.4–5.3)
PROT SERPL-MCNC: 7.1 G/DL (ref 6.8–8.8)
RBC # BLD AUTO: 4.41 10E12/L (ref 4.4–5.9)
SODIUM SERPL-SCNC: 138 MMOL/L (ref 133–144)
WBC # BLD AUTO: 6.7 10E9/L (ref 4–11)

## 2019-08-07 PROCEDURE — 85027 COMPLETE CBC AUTOMATED: CPT | Performed by: INTERNAL MEDICINE

## 2019-08-07 PROCEDURE — 83615 LACTATE (LD) (LDH) ENZYME: CPT | Performed by: INTERNAL MEDICINE

## 2019-08-07 PROCEDURE — 85610 PROTHROMBIN TIME: CPT | Performed by: INTERNAL MEDICINE

## 2019-08-07 PROCEDURE — 99214 OFFICE O/P EST MOD 30 MIN: CPT | Mod: 25 | Performed by: INTERNAL MEDICINE

## 2019-08-07 PROCEDURE — 85379 FIBRIN DEGRADATION QUANT: CPT | Performed by: INTERNAL MEDICINE

## 2019-08-07 PROCEDURE — 36415 COLL VENOUS BLD VENIPUNCTURE: CPT | Performed by: INTERNAL MEDICINE

## 2019-08-07 PROCEDURE — G0463 HOSPITAL OUTPT CLINIC VISIT: HCPCS | Mod: 25

## 2019-08-07 PROCEDURE — 80053 COMPREHEN METABOLIC PANEL: CPT | Performed by: INTERNAL MEDICINE

## 2019-08-07 PROCEDURE — 93750 INTERROGATION VAD IN PERSON: CPT | Mod: ZF | Performed by: INTERNAL MEDICINE

## 2019-08-07 ASSESSMENT — PATIENT HEALTH QUESTIONNAIRE - PHQ9: SUM OF ALL RESPONSES TO PHQ QUESTIONS 1-9: 9

## 2019-08-07 ASSESSMENT — MIFFLIN-ST. JEOR: SCORE: 1689.02

## 2019-08-07 ASSESSMENT — PAIN SCALES - GENERAL: PAINLEVEL: NO PAIN (0)

## 2019-08-07 NOTE — LETTER
8/7/2019      RE: Mariusz Sharp  [address] [facility] [address]       Dear Colleague,    Thank you for the opportunity to participate in the care of your patient, Mariusz Sharp, at the [facility] HEART [facility]. Please see a copy of my visit note below.    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    His LVAD postoperative course was complicated by mild RV dysfunction  Requiring dobutamine but he was successfully  discharged to TCU. He had a mechanical fall with CTH negative for bleed and has been seen at core clinic with low flow alarms that are asymptomatic thought to be related to afterload and volume status. CT was done there is no problem with the LVAD positioning or evidence of outflow graft obstruction/twist.  Pt was also hospitalized with PA catheter placement and hemodynamic monitoring and no hemodynamic, arrhythmogenic or mechanical issues could be identified for the low flow alarms.      Today patient reports that he has been feeling well.  He denies any chest pain or pressure, sob/faustin, orthopnea, pnd, palpitations, syncope, gasper, bleeding, LVAD driveline tenderness or discharge.  He also denies any problems with his medications and reports compliance.      PAST MEDICAL HISTORY:  Past Medical History:   Diagnosis Date     Acute kidney injury (H)      CKD (chronic kidney disease)      Coronary artery disease      Diabetes mellitus (H)      HTN (hypertension)      Hyperlipidemia      Ischemic cardiomyopathy      LV (left ventricular) mural thrombus      Paroxysmal atrial flutter (H)      RVF (right ventricular failure) (H)      Systolic heart failure (H)      Ventricular tachycardia (H)        FAMILY  HISTORY:  Reviewed    SOCIAL HISTORY:  Social History     Social History     Marital status: Single     Spouse name: N/A     Number of children: N/A     Years of education: N/A     Social History Main Topics     Smoking status: Never Smoker     Smokeless tobacco: Never Used     Alcohol use Not on file     Drug use: Not on file     Sexual activity: Not on file     Other Topics Concern     Not on file     Social History Narrative       CURRENT MEDICATIONS:  Outpatient Medications Prior to Visit   Medication Sig Dispense Refill     amiodarone (PACERONE/CODARONE) 200 MG tablet Take 1 tablet (200 mg) by mouth daily 90 tablet 3     aspirin (ASA) 81 MG chewable tablet Take 1 tablet (81 mg) by mouth daily 90 tablet 3     carvedilol (COREG) 12.5 MG tablet Take 1 tablet (12.5 mg) by mouth 2 times daily (with meals) 60 tablet 3     digoxin (LANOXIN) 125 MCG tablet Take 1 tablet (125 mcg) by mouth daily 90 tablet 3     famotidine (PEPCID) 20 MG tablet Take 1 tablet (20 mg) by mouth 2 times daily 180 tablet 3     hydrALAZINE (APRESOLINE) 50 MG tablet Take 2 tablets (100 mg) by mouth 3 times daily 90 tablet 3     insulin aspart (NOVOLOG PEN) 100 UNIT/ML pen Prandial Dosin units per 7 grams of carbohydrate each Meal or Snack.  Only chart total amount of units given.  Do not give if pre-prandial glucose is less than 60 mg/dL. If given at mealtime, administer within 30 minutes of start of meal. 3 mL 1     insulin glargine (LANTUS SOLOSTAR PEN) 100 UNIT/ML pen Inject 24 Units Subcutaneous At Bedtime 3 mL 11     lisinopril (PRINIVIL/ZESTRIL) 10 MG tablet Take 1 tablet (10 mg) by mouth daily 30 tablet 1     nitroGLYcerin (NITROSTAT) 0.4 MG sublingual tablet Place 1 tablet (0.4 mg) under the tongue every 5 minutes as needed for chest pain For chest pain place 1 tablet under the tongue every 5 minutes for 3 doses. If symptoms persist 5 minutes after 1st dose call 911. 10 tablet 0     rosuvastatin (CRESTOR) 20 MG tablet Take 1 tablet  "(20 mg) by mouth At Bedtime 90 tablet 3     warfarin (COUMADIN) 2.5 MG tablet Take two to three tablets daily or as directed by the Coumadin clinic 150 tablet 5     insulin aspart (NOVOLOG PEN) 100 UNIT/ML pen Inject 1-10 Units Subcutaneous 3 times daily (before meals) Correction Scale - HIGH INSULIN RESISTANCE DOSING     -164 =1 units.   -189 =2.   -214 =3.   -239 =4.   -264 =5.   -289 =6.   -314 =7.   -339 =8.   -364 =9.  BG greater than or equal to 365 give 10 units  To be given with meal insulin, based on pre-meal BG 9 mL 0     insulin aspart (NOVOLOG PEN) 100 UNIT/ML pen Inject 1-7 Units Subcutaneous At Bedtime HIGH INSULIN RESISTANCE DOSING    Bedtime  For  - 224 give 1 units.   For  - 249 give 2 units.   For  - 274 give 3 units.   For  - 299 give 4 units.   For  - 324 give 5 units.   For  - 349 give 6 units.   For BG greater than or equal to 350 give 7 units. (Patient not taking: Reported on 2/11/2019) 2.1 mL 0     No facility-administered medications prior to visit.        ROS:   CONSTITUTIONAL: Denies fever, chills, fatigue, or weight fluctuations.   HEENT: Denies headache and changes in speech. He complains of vision changes.   CV: Refer to HPI.   PULMONARY:Denies shortness of breath, cough, or previous TB exposure.   GI:Denies nausea, vomiting, diarrhea, and abdominal pain. Bowel movements are regular.   :See HPI  EXT:Denies lower extremity edema.   SKIN:Denies abnormal rashes or lesions.   MUSCULOSKELETAL:Denies upper or lower extremity weakness and pain.   NEUROLOGIC:Denies lightheadedness, dizziness, seizures, or upper or lower extremity paresthesia.     EXAM:  BP (!) 100/0 (BP Location: Left arm, Patient Position: Chair, Cuff Size: Adult Large)   Pulse 62   Ht 1.626 m (5' 4\")   Wt 94.8 kg (209 lb)   SpO2 96%   BMI 35.87 kg/m     GENERAL: Appears alert and oriented times three. But is somewhat dizzy  NECK: " Supple and without lymphadenopathy. JVD ~5-6cm.  CV: LVAD Humm  RESPIRATORY: Respirations regular, even, and unlabored. Lungs CTA throughout.   GI: Soft and non distended with normoactive bowel sounds present in all quadrants. No tenderness, rebound, guarding. No organomegaly.    EXTREMITIES: No peripheral edema. .   NEUROLOGIC: Alert and orientated x 3. CN II-XII grossly intact. No focal deficits.   MUSCULOSKELETAL: No joint swelling or tenderness.   SKIN: No jaundice. No rashes or lesions.     Labs:  Orders Only on 08/07/2019   Component Date Value Ref Range Status     D Dimer 08/07/2019 0.6* 0.0 - 0.50 ug/ml FEU Final    Comment: This D-dimer assay is intended for use in conjunction with a clinical pretest   probability assessment model to exclude pulmonary embolism (PE) and deep   venous thrombosis (DVT) in outpatients suspected of PE or DVT. The cut-off   value is 0.5 ug/mL FEU.       Lactate Dehydrogenase 08/07/2019 146  85 - 227 U/L Final     INR 08/07/2019 2.71* 0.86 - 1.14 Final     WBC 08/07/2019 6.7  4.0 - 11.0 10e9/L Final     RBC Count 08/07/2019 4.41  4.4 - 5.9 10e12/L Final     Hemoglobin 08/07/2019 13.1* 13.3 - 17.7 g/dL Final     Hematocrit 08/07/2019 39.5* 40.0 - 53.0 % Final     MCV 08/07/2019 90  78 - 100 fl Final     MCH 08/07/2019 29.7  26.5 - 33.0 pg Final     MCHC 08/07/2019 33.2  31.5 - 36.5 g/dL Final     RDW 08/07/2019 14.6  10.0 - 15.0 % Final     Platelet Count 08/07/2019 243  150 - 450 10e9/L Final     Sodium 08/07/2019 138  133 - 144 mmol/L Final     Potassium 08/07/2019 4.0  3.4 - 5.3 mmol/L Final     Chloride 08/07/2019 107  94 - 109 mmol/L Final     Carbon Dioxide 08/07/2019 25  20 - 32 mmol/L Final     Anion Gap 08/07/2019 6  3 - 14 mmol/L Final     Glucose 08/07/2019 261* 70 - 99 mg/dL Final     Urea Nitrogen 08/07/2019 25  7 - 30 mg/dL Final     Creatinine 08/07/2019 1.82* 0.66 - 1.25 mg/dL Final     GFR Estimate 08/07/2019 40* >60 mL/min/[1.73_m2] Final    Comment: Non   American GFR Calc  Starting 2018, serum creatinine based estimated GFR (eGFR) will be   calculated using the Chronic Kidney Disease Epidemiology Collaboration   (CKD-EPI) equation.       GFR Estimate If Black 2019 47* >60 mL/min/[1.73_m2] Final    Comment:  GFR Calc  Starting 2018, serum creatinine based estimated GFR (eGFR) will be   calculated using the Chronic Kidney Disease Epidemiology Collaboration   (CKD-EPI) equation.       Calcium 2019 8.2* 8.5 - 10.1 mg/dL Final     Bilirubin Total 2019 0.8  0.2 - 1.3 mg/dL Final     Albumin 2019 3.5  3.4 - 5.0 g/dL Final     Protein Total 2019 7.1  6.8 - 8.8 g/dL Final     Alkaline Phosphatase 2019 51  40 - 150 U/L Final     ALT 2019 43  0 - 70 U/L Final     AST 2019 34  0 - 45 U/L Final         Diagnostics:  Recent Results (from the past 4320 hour(s))   ECHO COMPLETE WITH OPTISON    Narrative    [id number]  [id number]  [id number]  [id number]^STEFANIA^MANNY^V           [facility]  Echocardiography Laboratory  [address] [place] [address]     Name: ADILSON RINCON  MRN: [id number]  : 1960  Study Date: 2018 11:27 AM  Age: 57 yrs  Gender: Male  [place] [facility]  Reason For Study: Cardiac Arrest  Ordering Physician: MANNY AGUIAR  Referring Physician: SELF, REFERRED  Performed By: Laya Read RDCS     BSA: 1.8 m2  Height: 64 in  Weight: 170 lb  HR: 78  BP: 99/76 mmHg  _____________________________________________________________________________  __        Procedure  Echocardiogram with two-dimensional, color and spectral Doppler performed.  Contrast Optison. Optison (NDC #2440-2238-94) given intravenously. Patient was  given 5 ml mixture of 3 ml Optison and 6 ml saline. 4 ml wasted.  _____________________________________________________________________________  __        Interpretation Summary  Left ventricular wall thickness  is normal. Borderline left ventricular  dilation is present. LVIDd is 5.19cm The Ejection Fraction is estimated at 20-  25%. The mid to distal anteroseptum, mid to distal anterior wall, mid to  distal anterolateral wall and mid to apical septum and apex are akinetic. This  is consistent with large LAD territory infarct. Laminar thrombus is seen in  the LV apex  The right ventricle is normal size. Difficult to evaluate RV function but it  appears to be mild to moderately reduced.  Moderate tricuspid insufficiency is present. Right ventricular systolic  pressure is 26mmHg above the right atrial pressure.  Dilation of the inferior vena cava is present with normal respiratory  variation in diameter. Estimated mean right atrial pressure is 8 mmHg (mildly  elevated).  No pericardial effusion is present.     Previous study not available for comparison.  _____________________________________________________________________________  __        Left Ventricle  Left ventricular wall thickness is normal. Borderline left ventricular  dilation is present. LVIDd is 5.19cm. Grade III or advanced diastolic  dysfunction. The Ejection Fraction is estimated at 20-25%. The mid to distal  anteroseptum, mid to distal anterior  wall, mid to distal anterolateral wall and mid to apical septum and apex are  akinetic. This is consistent with large LAD territory infarct. Laminar  thrombus is seen in the LV apex.     Right Ventricle  Right ventricular wall thickness is normal. The right ventricle is normal  size. Difficult to evaluate RV function but it appears to be mild to  moderately reduced. A pacemaker lead is noted in the right ventricle.     Atria  Moderate left atrial enlargement is present. Moderate right atrial enlargement  is present. A pacemaker lead is noted in the right atrium.     Mitral Valve  The mitral valve is normal. Trace mitral insufficiency is present.        Aortic Valve  Aortic valve is normal in structure and function.  The aortic valve is  tricuspid.     Tricuspid Valve  The tricuspid valve is normal. Moderate tricuspid insufficiency is present.  The right ventricular systolic pressure is approximated at 26.0 mmHg plus the  right atrial pressure. Right ventricular systolic pressure is 26mmHg above the  right atrial pressure.     Pulmonic Valve  The pulmonic valve is normal. Trace pulmonic insufficiency is present.     Vessels  The aorta root is normal. Dilation of the inferior vena cava is present with  normal respiratory variation in diameter. Estimated mean right atrial pressure  is 8 mmHg (mildly elevated).     Pericardium  No pericardial effusion is present.        Compared to Previous Study  Previous study not available for comparison.  _____________________________________________________________________________  __  MMode/2D Measurements & Calculations  IVSd: 0.74 cm     LVIDd: 5.2 cm  LVIDs: 4.0 cm  LVPWd: 1.0 cm  FS: 23.3 %  LV mass(C)d: 167.6 grams  LV mass(C)dI: 91.8 grams/m2  asc Aorta Diam: 3.6 cm  LVOT diam: 2.1 cm  LVOT area: 3.5 cm2  LA Volume (BP): 81.1 ml  LA Volume Index (BP): 44.3 ml/m2  RWT: 0.40           Doppler Measurements & Calculations  MV E max sasha: 101.0 cm/sec  MV A max sasha: 16.5 cm/sec  MV E/A: 6.1  MV dec slope: 660.0 cm/sec2  PA V2 max: 96.5 cm/sec  PA max PG: 3.7 mmHg  PA acc time: 0.11 sec  TR max sasha: 255.0 cm/sec  TR max P.0 mmHg  E/E' av.8  Lateral E/e': 11.0  Medial E/e': 34.5     _____________________________________________________________________________  __           Report approved by: LEÓN Powell 2018 02:05 PM                     RHC 18  Right Heart Catheterization:  /95/112  HR 61  BSA 1.91  RA    RV   PA 18   PCW 15/13/12   William CO 3.7   William CI 2.0   TD CO 4.6   TD CI 2.5   PA sat 51.8%  PCW sat 95.6%   Hgb 9.1 g/dL   PVR 1.6    [facility] [address] 19  Pressures Phase: Baseline    Time Systolic Diastolic Mean A Wave V Wave EDP Max dp/dt HR   RA  Pressures 12:28 PM   10 mmHg    14 mmHg    12 mmHg      53 bpm      RV Pressures 12:07 PM       288 mmHg/sec        12:29 PM 30 mmHg    8 mmHg       13 mmHg     53 bpm      PA Pressures 12:32 PM 30 mmHg    11 mmHg    18 mmHg        53 bpm      PCW Pressures 12:30 PM   14 mmHg    17 mmHg    17 mmHg      52 bpm      Blood Flow Results Phase: Baseline    Time Results Indexed Values   QP 12:07 PM 3.85 L/min    1.99 L/min/m2      QS 12:07 PM 3.85 L/min    1.99 L/min/m2      Blood Oximetry Phase: Baseline    Time Hb SAT(%) PO2 Content PA Sat   PA 12:07 PM  61.4 %      61.4 %      Art 12:07 PM  97 %     15.7 mL/dL       Cardiac Output Phase: Baseline    Time TDCO TDCI William C.O. William C.I. William HR   Cardiac Output Results 12:07 PM   3.85 L/min    1.99 L/min/m2       Resistance Results Phase: Baseline    Time PVR SVR PVR-I SVR-I TPR TVR TPR-I TVR-I PVR/SVR TPR/TVR   Resistance Results (Metric) 12:07 PM 83.09 dsc-5     161.14 dsc-5/m2     373.91 dsc-5     725.13 dsc-5/m2         Resistance Results (Wood) 12:07 PM 1.04 LERMA     2.01 LERMA/m2     4.68 LERMA     9.07 LERMA/m2             Echo 7/7/19  Interpretation Summary  HM 3 at 5300RPM.  Severely (EF 20-30%) reduced left ventricular function is present. LVIDd:4.6  cm. Septum midline.  Left ventricular size is normal.  Global right ventricular function is mildly reduced.  Moderate right ventricular dilation is present.  AV is not seen well, No AI.  Inflow velocity cannot be assessed. Outflow velocity is normal.  The inferior vena cava was normal in size with preserved respiratory  variability.  No pericardial effusion is present.  This study was compared with the study from 6/26/2019 .There has been no  change including RV function.      LVAD interrogation:   HM 3:   Flow: 3 L/min,    Speed: 5300 RPMs,     PI: 11 ,  Power: 3.9 Parker, Hct: 20 (set to lowest level to decrease low flow alarms)   2). ALARMS  Alarms reported by patient since last pump evaluation: Yes, he continues to have low  flow alarms every morning.   Alarms or other finding noted during pump data history and alarm download: Pt has frequent PI events. There are occasional speed drops recorded with the PI events. He has two low flow alarms recorded on controller history. One has a flow reading of 2.4, the other 2.3. Pt did have several low flow alarms while in clinic. He states he has not getting them in the afternoons. He was laughing at the time they occurred. He was not dizzy/lightheaded/SOB. He was instructed to notify on-call VAD Coordinator if he ever has these symptoms along with his low flow alarms.         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    Chronic systolic heart failure secondary to ICM s/p HM3 12/31/18  Stage C, NYHA Class IV  ACEi/ARB: contionue lisinopril  VASODILATORS:  Continue hydralazine   BB  Continue coreg.   SCD prophylaxis ICD.  Given persistent LVAD alarms with low flows.  Have decreased in frequency.  Recent hospitalization with PA catheter placement and hemodynamic monitoring and no hemodynamic, arrhythmogenic or mechanical issues could be identified for the low flow alarms.  Symptoms with these events have greatly improved/resolved.      CAD. s/p STEMI with ALYSSA to LAD 6/27/18  - Continue Crestor and BB    PAF. CHADSVASC-4.   - Coumadin per AC.   - Continue BB, amiodarone.     VT/VF. With cardiac arrest.   - No recent ICD shocks    LV thrombus.   - Coumadin as above.     CKD Stage III.   - Cr slightly elevated today.  Exam c/w with mild hypovolemia.  Encouraged increase in po intake.      Follow-up:  F/u with NP in 3 months and with me in 6 months with device check and labs with both appointments.        Jenni Ortiz,  MD  Section Head - Advanced Heart Failure, Transplantation and Mechanical Circulatory Support  Director - Adult Congenital [facility] Cardiovascular [facility]etics [facility]  Associate Professor of Medicine, [facility]

## 2019-08-07 NOTE — NURSING NOTE
Vitals completed successfully and medication reconciled.     Pat Madera, IZZY  2:41 PM.    Chief Complaint   Patient presents with     Follow Up     1 month

## 2019-08-07 NOTE — NURSING NOTE
1). PUMP DATA  Primary controller serial number: [id number]      3:   Flow: 3 L/min,    Speed: 5300 RPMs,     PI: 11 ,  Power: 3.9 Parker, Hct: 20 (set to lowest level to decrease low flow alarms)     Primary controller   Back up battery: Patient use: 23, Replace in: 20  Months     Data downloaded: No   Equipment and driveline assessed for damage: Yes     Back up : Serial number: [id number]  Back up battery: Patient use: 7 Replace in: 24  Months  Programmed settings identical to the settings on the primary controller :N/A      Education complete: Yes   Charge the BACKUP controller s backup battery every 6 months  Perform a self test on BACKUP every 6 months  Change the MPU s batteries every 6 months:Yes    2). ALARMS  Alarms reported by patient since last pump evaluation: Yes, he continues to have low flow alarms every morning.   Alarms or other finding noted during pump data history and alarm download: Pt has frequent PI events. There are occasional speed drops recorded with the PI events. He has two low flow alarms recorded on controller history. One has a flow reading of 2.4, the other 2.3. Pt did have several low flow alarms while in clinic. He states he has not getting them in the afternoons. He was laughing at the time they occurred. He was not dizzy/lightheaded/SOB. He was instructed to notify on-call VAD Coordinator if he ever has these symptoms along with his low flow alarms.     Action Taken:  Reviewed data with patient: Yes      3). DRESSING CHANGE / DRIVELINE ASSESSMENT  Dressing change completed today: No  Appearance of Driveline site: Per pt, site is C/D/I, no redness, swelling, tenderness, or drainage.     Driveline stabilization: Method: [employer]  [ Teaching reinforced on need for stabilization of Driveline. ]

## 2019-08-07 NOTE — PATIENT INSTRUCTIONS
Medications:  1. No medication changes today!    Follow-up:  1. Please schedule an appointment with an NP in 3 months and Dr. Ortiz in 6 months. You should have a device check and labs with both appointments.     Instructions:  1. Try to drink a little more water. You are a little dehydrated.     Page the VAD Coordinator on call if you gain more than 3 lb in a day or 5 in a week. Please also page if you feel unwell or have alarms.     Great to see you in clinic today. To Page the VAD Coordinator on call, dial [phone] option #[phone] and ask to speak to the VAD coordinator on call.

## 2019-08-07 NOTE — PROGRESS NOTES
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    His LVAD postoperative course was complicated by mild RV dysfunction  Requiring dobutamine but he was successfully  discharged to TCU. He had a mechanical fall with CTH negative for bleed and has been seen at core clinic with low flow alarms that are asymptomatic thought to be related to afterload and volume status. CT was done there is no problem with the LVAD positioning or evidence of outflow graft obstruction/twist.  Pt was also hospitalized with PA catheter placement and hemodynamic monitoring and no hemodynamic, arrhythmogenic or mechanical issues could be identified for the low flow alarms.      Today patient reports that he has been feeling well.  He denies any chest pain or pressure, sob/faustin, orthopnea, pnd, palpitations, syncope, gasper, bleeding, LVAD driveline tenderness or discharge.  He also denies any problems with his medications and reports compliance.      PAST MEDICAL HISTORY:  Past Medical History:   Diagnosis Date     Acute kidney injury (H)      CKD (chronic kidney disease)      Coronary artery disease      Diabetes mellitus (H)      HTN (hypertension)      Hyperlipidemia      Ischemic cardiomyopathy      LV (left ventricular) mural thrombus      Paroxysmal atrial flutter (H)      RVF (right ventricular failure) (H)      Systolic heart failure (H)      Ventricular tachycardia (H)        FAMILY HISTORY:  Reviewed    SOCIAL HISTORY:  Social History     Social History     Marital status: Single     Spouse name: N/A     Number of children: N/A     Years of education: N/A     Social History Main Topics     Smoking status: Never Smoker     Smokeless tobacco: Never Used     Alcohol use Not on file     Drug  use: Not on file     Sexual activity: Not on file     Other Topics Concern     Not on file     Social History Narrative       CURRENT MEDICATIONS:  Outpatient Medications Prior to Visit   Medication Sig Dispense Refill     amiodarone (PACERONE/CODARONE) 200 MG tablet Take 1 tablet (200 mg) by mouth daily 90 tablet 3     aspirin (ASA) 81 MG chewable tablet Take 1 tablet (81 mg) by mouth daily 90 tablet 3     carvedilol (COREG) 12.5 MG tablet Take 1 tablet (12.5 mg) by mouth 2 times daily (with meals) 60 tablet 3     digoxin (LANOXIN) 125 MCG tablet Take 1 tablet (125 mcg) by mouth daily 90 tablet 3     famotidine (PEPCID) 20 MG tablet Take 1 tablet (20 mg) by mouth 2 times daily 180 tablet 3     hydrALAZINE (APRESOLINE) 50 MG tablet Take 2 tablets (100 mg) by mouth 3 times daily 90 tablet 3     insulin aspart (NOVOLOG PEN) 100 UNIT/ML pen Prandial Dosin units per 7 grams of carbohydrate each Meal or Snack.  Only chart total amount of units given.  Do not give if pre-prandial glucose is less than 60 mg/dL. If given at mealtime, administer within 30 minutes of start of meal. 3 mL 1     insulin glargine (LANTUS SOLOSTAR PEN) 100 UNIT/ML pen Inject 24 Units Subcutaneous At Bedtime 3 mL 11     lisinopril (PRINIVIL/ZESTRIL) 10 MG tablet Take 1 tablet (10 mg) by mouth daily 30 tablet 1     nitroGLYcerin (NITROSTAT) 0.4 MG sublingual tablet Place 1 tablet (0.4 mg) under the tongue every 5 minutes as needed for chest pain For chest pain place 1 tablet under the tongue every 5 minutes for 3 doses. If symptoms persist 5 minutes after 1st dose call 911. 10 tablet 0     rosuvastatin (CRESTOR) 20 MG tablet Take 1 tablet (20 mg) by mouth At Bedtime 90 tablet 3     warfarin (COUMADIN) 2.5 MG tablet Take two to three tablets daily or as directed by the Coumadin clinic 150 tablet 5     insulin aspart (NOVOLOG PEN) 100 UNIT/ML pen Inject 1-10 Units Subcutaneous 3 times daily (before meals) Correction Scale - HIGH INSULIN  "RESISTANCE DOSING     -164 =1 units.   -189 =2.   -214 =3.   -239 =4.   -264 =5.   -289 =6.   -314 =7.   -339 =8.   -364 =9.  BG greater than or equal to 365 give 10 units  To be given with meal insulin, based on pre-meal BG 9 mL 0     insulin aspart (NOVOLOG PEN) 100 UNIT/ML pen Inject 1-7 Units Subcutaneous At Bedtime HIGH INSULIN RESISTANCE DOSING    Bedtime  For  - 224 give 1 units.   For  - 249 give 2 units.   For  - 274 give 3 units.   For  - 299 give 4 units.   For  - 324 give 5 units.   For  - 349 give 6 units.   For BG greater than or equal to 350 give 7 units. (Patient not taking: Reported on 2/11/2019) 2.1 mL 0     No facility-administered medications prior to visit.        ROS:   CONSTITUTIONAL: Denies fever, chills, fatigue, or weight fluctuations.   HEENT: Denies headache and changes in speech. He complains of vision changes.   CV: Refer to HPI.   PULMONARY:Denies shortness of breath, cough, or previous TB exposure.   GI:Denies nausea, vomiting, diarrhea, and abdominal pain. Bowel movements are regular.   :See HPI  EXT:Denies lower extremity edema.   SKIN:Denies abnormal rashes or lesions.   MUSCULOSKELETAL:Denies upper or lower extremity weakness and pain.   NEUROLOGIC:Denies lightheadedness, dizziness, seizures, or upper or lower extremity paresthesia.     EXAM:  BP (!) 100/0 (BP Location: Left arm, Patient Position: Chair, Cuff Size: Adult Large)   Pulse 62   Ht 1.626 m (5' 4\")   Wt 94.8 kg (209 lb)   SpO2 96%   BMI 35.87 kg/m    GENERAL: Appears alert and oriented times three. But is somewhat dizzy  NECK: Supple and without lymphadenopathy. JVD ~5-6cm.  CV: LVAD Humm  RESPIRATORY: Respirations regular, even, and unlabored. Lungs CTA throughout.   GI: Soft and non distended with normoactive bowel sounds present in all quadrants. No tenderness, rebound, guarding. No organomegaly.    EXTREMITIES: No " peripheral edema. .   NEUROLOGIC: Alert and orientated x 3. CN II-XII grossly intact. No focal deficits.   MUSCULOSKELETAL: No joint swelling or tenderness.   SKIN: No jaundice. No rashes or lesions.     Labs:  Orders Only on 08/07/2019   Component Date Value Ref Range Status     D Dimer 08/07/2019 0.6* 0.0 - 0.50 ug/ml FEU Final    Comment: This D-dimer assay is intended for use in conjunction with a clinical pretest   probability assessment model to exclude pulmonary embolism (PE) and deep   venous thrombosis (DVT) in outpatients suspected of PE or DVT. The cut-off   value is 0.5 ug/mL FEU.       Lactate Dehydrogenase 08/07/2019 146  85 - 227 U/L Final     INR 08/07/2019 2.71* 0.86 - 1.14 Final     WBC 08/07/2019 6.7  4.0 - 11.0 10e9/L Final     RBC Count 08/07/2019 4.41  4.4 - 5.9 10e12/L Final     Hemoglobin 08/07/2019 13.1* 13.3 - 17.7 g/dL Final     Hematocrit 08/07/2019 39.5* 40.0 - 53.0 % Final     MCV 08/07/2019 90  78 - 100 fl Final     MCH 08/07/2019 29.7  26.5 - 33.0 pg Final     MCHC 08/07/2019 33.2  31.5 - 36.5 g/dL Final     RDW 08/07/2019 14.6  10.0 - 15.0 % Final     Platelet Count 08/07/2019 243  150 - 450 10e9/L Final     Sodium 08/07/2019 138  133 - 144 mmol/L Final     Potassium 08/07/2019 4.0  3.4 - 5.3 mmol/L Final     Chloride 08/07/2019 107  94 - 109 mmol/L Final     Carbon Dioxide 08/07/2019 25  20 - 32 mmol/L Final     Anion Gap 08/07/2019 6  3 - 14 mmol/L Final     Glucose 08/07/2019 261* 70 - 99 mg/dL Final     Urea Nitrogen 08/07/2019 25  7 - 30 mg/dL Final     Creatinine 08/07/2019 1.82* 0.66 - 1.25 mg/dL Final     GFR Estimate 08/07/2019 40* >60 mL/min/[1.73_m2] Final    Comment: Non  GFR Calc  Starting 12/18/2018, serum creatinine based estimated GFR (eGFR) will be   calculated using the Chronic Kidney Disease Epidemiology Collaboration   (CKD-EPI) equation.       GFR Estimate If Black 08/07/2019 47* >60 mL/min/[1.73_m2] Final    Comment:  GFR  Calc  Starting 2018, serum creatinine based estimated GFR (eGFR) will be   calculated using the Chronic Kidney Disease Epidemiology Collaboration   (CKD-EPI) equation.       Calcium 2019 8.2* 8.5 - 10.1 mg/dL Final     Bilirubin Total 2019 0.8  0.2 - 1.3 mg/dL Final     Albumin 2019 3.5  3.4 - 5.0 g/dL Final     Protein Total 2019 7.1  6.8 - 8.8 g/dL Final     Alkaline Phosphatase 2019 51  40 - 150 U/L Final     ALT 2019 43  0 - 70 U/L Final     AST 2019 34  0 - 45 U/L Final         Diagnostics:  Recent Results (from the past 4320 hour(s))   ECHO COMPLETE WITH OPTISON    Narrative    [id number]  [id number]  [id number]  [id number]^STEFANIA^MANNY^V           [facility],[place]  Echocardiography Laboratory  [address] [place], [place] [address]     Name: ADILSON RINCON  MRN: [id number]  : 1960  Study Date: 2018 11:27 AM  Age: 57 yrs  Gender: Male  Patient Location: [facility] [place]  Reason For Study: Cardiac Arrest  Ordering Physician: MANNY AGUIAR  Referring Physician: SELF, REFERRED  Performed By: Laya Read RDCS     BSA: 1.8 m2  Height: 64 in  Weight: 170 lb  HR: 78  BP: 99/76 mmHg  _____________________________________________________________________________  __        Procedure  Echocardiogram with two-dimensional, color and spectral Doppler performed.  Contrast Optison. Optison (NDC #2864-7058-74) given intravenously. Patient was  given 5 ml mixture of 3 ml Optison and 6 ml saline. 4 ml wasted.  _____________________________________________________________________________  __        Interpretation Summary  Left ventricular wall thickness is normal. Borderline left ventricular  dilation is present. LVIDd is 5.19cm The Ejection Fraction is estimated at 20-  25%. The mid to distal anteroseptum, mid to distal anterior wall, mid to  distal anterolateral wall and mid to apical septum and apex are akinetic. This  is consistent  with large LAD territory infarct. Laminar thrombus is seen in  the LV apex  The right ventricle is normal size. Difficult to evaluate RV function but it  appears to be mild to moderately reduced.  Moderate tricuspid insufficiency is present. Right ventricular systolic  pressure is 26mmHg above the right atrial pressure.  Dilation of the inferior vena cava is present with normal respiratory  variation in diameter. Estimated mean right atrial pressure is 8 mmHg (mildly  elevated).  No pericardial effusion is present.     Previous study not available for comparison.  _____________________________________________________________________________  __        Left Ventricle  Left ventricular wall thickness is normal. Borderline left ventricular  dilation is present. LVIDd is 5.19cm. Grade III or advanced diastolic  dysfunction. The Ejection Fraction is estimated at 20-25%. The mid to distal  anteroseptum, mid to distal anterior  wall, mid to distal anterolateral wall and mid to apical septum and apex are  akinetic. This is consistent with large LAD territory infarct. Laminar  thrombus is seen in the LV apex.     Right Ventricle  Right ventricular wall thickness is normal. The right ventricle is normal  size. Difficult to evaluate RV function but it appears to be mild to  moderately reduced. A pacemaker lead is noted in the right ventricle.     Atria  Moderate left atrial enlargement is present. Moderate right atrial enlargement  is present. A pacemaker lead is noted in the right atrium.     Mitral Valve  The mitral valve is normal. Trace mitral insufficiency is present.        Aortic Valve  Aortic valve is normal in structure and function. The aortic valve is  tricuspid.     Tricuspid Valve  The tricuspid valve is normal. Moderate tricuspid insufficiency is present.  The right ventricular systolic pressure is approximated at 26.0 mmHg plus the  right atrial pressure. Right ventricular systolic pressure is 26mmHg above  the  right atrial pressure.     Pulmonic Valve  The pulmonic valve is normal. Trace pulmonic insufficiency is present.     Vessels  The aorta root is normal. Dilation of the inferior vena cava is present with  normal respiratory variation in diameter. Estimated mean right atrial pressure  is 8 mmHg (mildly elevated).     Pericardium  No pericardial effusion is present.        Compared to Previous Study  Previous study not available for comparison.  _____________________________________________________________________________  __  MMode/2D Measurements & Calculations  IVSd: 0.74 cm     LVIDd: 5.2 cm  LVIDs: 4.0 cm  LVPWd: 1.0 cm  FS: 23.3 %  LV mass(C)d: 167.6 grams  LV mass(C)dI: 91.8 grams/m2  asc Aorta Diam: 3.6 cm  LVOT diam: 2.1 cm  LVOT area: 3.5 cm2  LA Volume (BP): 81.1 ml  LA Volume Index (BP): 44.3 ml/m2  RWT: 0.40           Doppler Measurements & Calculations  MV E max sasha: 101.0 cm/sec  MV A max sasha: 16.5 cm/sec  MV E/A: 6.1  MV dec slope: 660.0 cm/sec2  PA V2 max: 96.5 cm/sec  PA max PG: 3.7 mmHg  PA acc time: 0.11 sec  TR max sasha: 255.0 cm/sec  TR max P.0 mmHg  E/E' av.8  Lateral E/e': 11.0  Medial E/e': 34.5     _____________________________________________________________________________  __           Report approved by: LEÓN Powell 2018 02:05 PM                     [facility] 18  Right Heart Catheterization:  /95/112  HR 61  BSA 1.91  RA    RV 30  PA 18   PCW 15/13/12   William CO 3.7   William CI 2.0   TD CO 4.6   TD CI 2.5   PA sat 51.8%  PCW sat 95.6%   Hgb 9.1 g/dL   PVR 1.6    RHC 19  Pressures Phase: Baseline    Time Systolic Diastolic Mean A Wave V Wave EDP Max dp/dt HR   RA Pressures 12:28 PM   10 mmHg    14 mmHg    12 mmHg      53 bpm      RV Pressures 12:07 PM       288 mmHg/sec        12:29 PM 30 mmHg    8 mmHg       13 mmHg     53 bpm      PA Pressures 12:32 PM 30 mmHg    11 mmHg    18 mmHg        53 bpm      PCW Pressures 12:30 PM   14 mmHg    17 mmHg     17 mmHg      52 bpm      Blood Flow Results Phase: Baseline    Time Results Indexed Values   QP 12:07 PM 3.85 L/min    1.99 L/min/m2      QS 12:07 PM 3.85 L/min    1.99 L/min/m2      Blood Oximetry Phase: Baseline    Time Hb SAT(%) PO2 Content PA Sat   PA 12:07 PM  61.4 %      61.4 %      Art 12:07 PM  97 %     15.7 mL/dL       Cardiac Output Phase: Baseline    Time TDCO TDCI William C.O. William C.I. William HR   Cardiac Output Results 12:07 PM   3.85 L/min    1.99 L/min/m2       Resistance Results Phase: Baseline    Time PVR SVR PVR-I SVR-I TPR TVR TPR-I TVR-I PVR/SVR TPR/TVR   Resistance Results (Metric) 12:07 PM 83.09 dsc-5     161.14 dsc-5/m2     373.91 dsc-5     725.13 dsc-5/m2         Resistance Results (Wood) 12:07 PM 1.04 LERMA     2.01 LERMA/m2     4.68 LERMA     9.07 LERMA/m2             Echo 7/7/19  Interpretation Summary  HM 3 at 5300RPM.  Severely (EF 20-30%) reduced left ventricular function is present. LVIDd:4.6  cm. Septum midline.  Left ventricular size is normal.  Global right ventricular function is mildly reduced.  Moderate right ventricular dilation is present.  AV is not seen well, No AI.  Inflow velocity cannot be assessed. Outflow velocity is normal.  The inferior vena cava was normal in size with preserved respiratory  variability.  No pericardial effusion is present.  This study was compared with the study from 6/26/2019 .There has been no  change including RV function.      LVAD interrogation:   HM 3:   Flow: 3 L/min,    Speed: 5300 RPMs,     PI: 11 ,  Power: 3.9 Parker, Hct: 20 (set to lowest level to decrease low flow alarms)   2). ALARMS  Alarms reported by patient since last pump evaluation: Yes, he continues to have low flow alarms every morning.   Alarms or other finding noted during pump data history and alarm download: Pt has frequent PI events. There are occasional speed drops recorded with the PI events. He has two low flow alarms recorded on controller history. One has a flow reading of 2.4, the  other 2.3. Pt did have several low flow alarms while in clinic. He states he has not getting them in the afternoons. He was laughing at the time they occurred. He was not dizzy/lightheaded/SOB. He was instructed to notify on-call VAD Coordinator if he ever has these symptoms along with his low flow alarms.         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    Chronic systolic heart failure secondary to ICM s/p HM3 12/31/18  Stage C, NYHA Class IV  ACEi/ARB: contionue lisinopril  VASODILATORS:  Continue hydralazine   BB  Continue coreg.   SCD prophylaxis ICD.  Given persistent LVAD alarms with low flows.  Have decreased in frequency.  Recent hospitalization with PA catheter placement and hemodynamic monitoring and no hemodynamic, arrhythmogenic or mechanical issues could be identified for the low flow alarms.  Symptoms with these events have greatly improved/resolved.      CAD. s/p STEMI with ALYSSA to LAD 6/27/18  - Continue Crestor and BB    PAF. CHADSVASC-4.   - Coumadin per AC.   - Continue BB, amiodarone.     VT/VF. With cardiac arrest.   - No recent ICD shocks    LV thrombus.   - Coumadin as above.     CKD Stage III.   - Cr slightly elevated today.  Exam c/w with mild hypovolemia.  Encouraged increase in po intake.      Follow-up:  F/u with NP in 3 months and with me in 6 months with device check and labs with both appointments.        Jenni Ortiz MD  Section Head - Advanced Heart Failure, Transplantation and Mechanical Circulatory Support  Director - Adult Congenital and Cardiovascular Genetics Center  Associate Professor of Medicine, [facility]

## 2019-08-07 NOTE — PROGRESS NOTES
ANTICOAGULATION FOLLOW-UP CLINIC VISIT    Patient Name:  Mariusz Sharp  Date:  2019  Contact Type:  Telephone    SUBJECTIVE:  Patient Findings     Comments:   Spoke to patient.        Clinical Outcomes     Comments:   Spoke to patient.           OBJECTIVE    INR   Date Value Ref Range Status   2019 2.71 (H) 0.86 - 1.14 Final     Chromogenic Factor 10   Date Value Ref Range Status   2019 72 70 - 130 % Final     Comment:     Therapeutic Range:  A Chromogenic Factor 10 level of approximately 20-40%   inversely correlates with an INR of 2-3 for patients receiving Warfarin.   Chromogenic Factor 10 levels below 20% indicate an INR greater than 3 and   levels above 40% indicate an INR less than 2.         ASSESSMENT / PLAN  INR assessment THER    Recheck INR In: 1 WEEK    INR Location Clinic      Anticoagulation Summary  As of 2019    INR goal:   2.0-3.0   TTR:   79.4 % (6.4 mo)   INR used for dosin.71 (2019)   Warfarin maintenance plan:   7.5 mg (5 mg x 1.5) every Mon, Wed, Fri; 5 mg (5 mg x 1) all other days   Full warfarin instructions:   7.5 mg every Mon, Wed, Fri; 5 mg all other days   Weekly warfarin total:   42.5 mg   No change documented:   Sandor Sanchez RN   Plan last modified:   Brenda Alford RN (2019)   Next INR check:   2019   Priority:   INR   Target end date:   Indefinite    Indications    Heart failure (H) [I50.9]  LVAD (left ventricular assist device) present (H) [Z95.811]             Anticoagulation Episode Summary     INR check location:       Preferred lab:       Send INR reminders to:   [facility]    Comments:   LVAD implanted 18  ASA 81mg Daily Has 2.5mg tablets   [place] lab dxp=[phone].  Faxed S.O there on 2019 Follow INR's only no Chromogenic X's      Anticoagulation Care Providers     Provider Role Specialty Phone number    Jenni Ortiz MD [facility] [phone]            See the Encounter Report to  view Anticoagulation Flowsheet and Dosing Calendar (Go to Encounters tab in chart review, and find the Anticoagulation Therapy Visit)    INR/CFX/F2 RESULT:INR result is 2.71 on 8/7    ASSESSMENT:No Problems found. No Changes in Health, Medications, or diet. No Signs of bruising, bleeding or clotting.    DOSING ADJUSTMENT: Continue current maintenance dose    NEXT INR/FACTOR X OR FACTOR II:1 week 8/14    PROTOCOL FOLLOWED:LVAD    Patient had LVAD placed on:   12/31/18  Patient's current Aspirin dose: 81mg  LVAD Protocol followed:  Yes.   If Not Followed Explanation:  Sandor Zaman RN

## 2019-08-09 ENCOUNTER — DOCUMENTATION ONLY (OUTPATIENT)
Dept: TRANSPLANT | Facility: CLINIC | Age: 59
End: 2019-08-09

## 2019-08-14 ENCOUNTER — ANTICOAGULATION THERAPY VISIT (OUTPATIENT)
Dept: ANTICOAGULATION | Facility: CLINIC | Age: 59
End: 2019-08-14

## 2019-08-14 DIAGNOSIS — Z95.811 LVAD (LEFT VENTRICULAR ASSIST DEVICE) PRESENT (H): ICD-10-CM

## 2019-08-14 DIAGNOSIS — I50.9 HEART FAILURE (H): ICD-10-CM

## 2019-08-14 LAB — INR PPP: 3.6

## 2019-08-14 NOTE — PROGRESS NOTES
ANTICOAGULATION FOLLOW-UP CLINIC VISIT    Patient Name:  Mariusz Sharp  Date:  8/14/2019  Contact Type:  Telephone    SUBJECTIVE:  Patient Findings     Comments:   Pt reports he took a 7.5mg dose last night instead of his 5mg dose. Will update the calendar to reflect this        Clinical Outcomes     Comments:   Pt reports he took a 7.5mg dose last night instead of his 5mg dose. Will update the calendar to reflect this           OBJECTIVE    INR   Date Value Ref Range Status   08/14/2019 3.60  Final     Comment:     Outside Lab      Chromogenic Factor 10   Date Value Ref Range Status   01/11/2019 72 70 - 130 % Final     Comment:     Therapeutic Range:  A Chromogenic Factor 10 level of approximately 20-40%   inversely correlates with an INR of 2-3 for patients receiving Warfarin.   Chromogenic Factor 10 levels below 20% indicate an INR greater than 3 and   levels above 40% indicate an INR less than 2.         ASSESSMENT / PLAN  INR assessment SUPRA    Recheck INR In: 5 DAYS    INR Location Outside lab      Anticoagulation Summary  As of 8/14/2019    INR goal:   2.0-3.0   TTR:   77.9 % (6.7 mo)   INR used for dosing:   3.60! (8/14/2019)   Warfarin maintenance plan:   7.5 mg (5 mg x 1.5) every Mon, Wed, Fri; 5 mg (5 mg x 1) all other days   Full warfarin instructions:   8/14: 2.5 mg; Otherwise 7.5 mg every Mon, Wed, Fri; 5 mg all other days   Weekly warfarin total:   42.5 mg   Plan last modified:   Brenda Alford RN (7/17/2019)   Next INR check:   8/19/2019   Priority:   INR   Target end date:   Indefinite    Indications    Heart failure (H) [I50.9]  LVAD (left ventricular assist device) present (H) [Z95.811]             Anticoagulation Episode Summary     INR check location:       Preferred lab:       Send INR reminders to:   [facility]    Comments:   LVAD implanted 12/31/18  ASA 81mg Daily Has 2.5mg tablets   [facility] lab eyx=[phone].  Faxed S.O there on 2/25/2019 7/9/19 Follow INR's only no Chromogenic  X's      Anticoagulation Care Providers     Provider Role Specialty Phone number    Jenni Ortiz MD Responsible Cardiology [phone]            See the Encounter Report to view Anticoagulation Flowsheet and Dosing Calendar (Go to Encounters tab in chart review, and find the Anticoagulation Therapy Visit)    Spoke with patient. Gave them their lab results and new warfarin recommendation.  No changes in health, medication, or diet. No missed doses, no falls. No signs or symptoms of bleed or clotting.     Chastiy Gonzalez RN

## 2019-08-19 ENCOUNTER — CARE COORDINATION (OUTPATIENT)
Dept: CARDIOLOGY | Facility: CLINIC | Age: 59
End: 2019-08-19

## 2019-08-19 ENCOUNTER — ANTICOAGULATION THERAPY VISIT (OUTPATIENT)
Dept: ANTICOAGULATION | Facility: CLINIC | Age: 59
End: 2019-08-19

## 2019-08-19 DIAGNOSIS — I50.9 HEART FAILURE (H): ICD-10-CM

## 2019-08-19 DIAGNOSIS — Z95.811 LVAD (LEFT VENTRICULAR ASSIST DEVICE) PRESENT (H): ICD-10-CM

## 2019-08-19 LAB
INR PPP: 3.8
SA1 CELL: NORMAL
SA1 COMMENTS: NORMAL
SA1 HI RISK ABY: NORMAL
SA1 MOD RISK ABY: NORMAL
SA1 TEST METHOD: NORMAL
SA2 CELL: NORMAL
SA2 COMMENTS: NORMAL
SA2 HI RISK ABY UA: NORMAL
SA2 MOD RISK ABY: NORMAL
SA2 TEST METHOD: NORMAL
UNACCEPTABLE ANTIGEN: NORMAL
UNOS CPRA: 0

## 2019-08-19 NOTE — PROGRESS NOTES
8/19/19   Mariusz calling.  INR is 3.8 today. This writer will await a call back from LVAD team for dosing recommendations.  Will addend once directions are in place.    Sandor Sanchez RN

## 2019-08-19 NOTE — PROGRESS NOTES
ANTICOAGULATION FOLLOW-UP CLINIC VISIT    Patient Name:  Mariusz Sharp  Date:  8/19/2019  Contact Type:  Telephone    SUBJECTIVE:         OBJECTIVE    INR   Date Value Ref Range Status   08/19/2019 3.8  Final     Comment:     Outside lab     Chromogenic Factor 10   Date Value Ref Range Status   01/11/2019 72 70 - 130 % Final     Comment:     Therapeutic Range:  A Chromogenic Factor 10 level of approximately 20-40%   inversely correlates with an INR of 2-3 for patients receiving Warfarin.   Chromogenic Factor 10 levels below 20% indicate an INR greater than 3 and   levels above 40% indicate an INR less than 2.         ASSESSMENT / PLAN  INR assessment SUPRA    Recheck INR In: 4 DAYS    INR Location Outside lab      Anticoagulation Summary  As of 8/19/2019    INR goal:   2.0-3.0   TTR:   76.0 % (6.8 mo)   INR used for dosing:   3.8! (8/19/2019)   Warfarin maintenance plan:   7.5 mg (5 mg x 1.5) every Mon, Wed, Fri; 5 mg (5 mg x 1) all other days   Full warfarin instructions:   8/19: 2.5 mg; 8/20: 2.5 mg; Otherwise 7.5 mg every Mon, Wed, Fri; 5 mg all other days   Weekly warfarin total:   42.5 mg   Plan last modified:   Brenda Alford RN (7/17/2019)   Next INR check:   8/23/2019   Priority:   INR   Target end date:   Indefinite    Indications    Heart failure (H) [I50.9]  LVAD (left ventricular assist device) present (H) [Z95.811]             Anticoagulation Episode Summary     INR check location:       Preferred lab:       Send INR reminders to:   [facility]    Comments:   LVAD implanted 12/31/18  ASA 81mg Daily Has 2.5mg tablets   [place] [facility] eua=[phone].  Faxed S.O there on 2/25/2019 7/9/19 Follow INR's only no Chromogenic X's      Anticoagulation Care Providers     Provider Role Specialty Phone number    Jenni Ortiz MD [facility] Cardiology [phone]            See the Encounter Report to view Anticoagulation Flowsheet and Dosing Calendar (Go to Encounters tab in chart review, and find  the Anticoagulation Therapy Visit)    INR/CFX/F2 RESULT: INR result is 3.8 via fingerstick per outside lab result.    ASSESSMENT:No Problems found. No Changes in Health, Medications, or diet. No Signs of bruising, bleeding or clotting.    DOSING ADJUSTMENT: Consulted LVAD team.  Reduced dose to 2.5mg today and tomorrow, then resume maintenance dose    NEXT INR/FACTOR X OR FACTOR II: 8/23    PROTOCOL FOLLOWED: LVAD  Patient had LVAD placed on:   12/31/18  Type of LVAD: HM 3  Patient's current Aspirin dose: 81mg   LVAD Protocol followed:  Yes.   If Not Followed Explanation:  Sandor Zaman, RN

## 2019-08-23 ENCOUNTER — ANTICOAGULATION THERAPY VISIT (OUTPATIENT)
Dept: ANTICOAGULATION | Facility: CLINIC | Age: 59
End: 2019-08-23

## 2019-08-23 DIAGNOSIS — I50.9 HEART FAILURE (H): ICD-10-CM

## 2019-08-23 DIAGNOSIS — Z95.811 LVAD (LEFT VENTRICULAR ASSIST DEVICE) PRESENT (H): ICD-10-CM

## 2019-08-23 LAB
ANION GAP SERPL CALCULATED.3IONS-SCNC: 8 MMOL/L
BUN SERPL-MCNC: 28 MG/DL
CALCIUM SERPL-MCNC: 8.8 MG/DL
CHLORIDE SERPLBLD-SCNC: 105 MMOL/L
CO2 SERPL-SCNC: 24 MMOL/L
CREAT SERPL-MCNC: 1.54 MG/DL
GFR SERPL CREATININE-BSD FRML MDRD: 47 ML/MIN/1.73M2
GLUCOSE SERPL-MCNC: 240 MG/DL (ref 70–99)
INR PPP: 2.5
POTASSIUM SERPL-SCNC: 4.3 MMOL/L
SODIUM SERPL-SCNC: 137 MMOL/L

## 2019-08-23 NOTE — PROGRESS NOTES
ANTICOAGULATION FOLLOW-UP CLINIC VISIT    9/3/19 ADDENDUM  Mariusz caldera.  Updated calendar.  He has an appt tomorrow with labs. TE    Patient Name:  Mariusz Sharp  Date:  2019  Contact Type:  Telephone    SUBJECTIVE:         OBJECTIVE    INR   Date Value Ref Range Status   2019 2.5  Final     Chromogenic Factor 10   Date Value Ref Range Status   2019 72 70 - 130 % Final     Comment:     Therapeutic Range:  A Chromogenic Factor 10 level of approximately 20-40%   inversely correlates with an INR of 2-3 for patients receiving Warfarin.   Chromogenic Factor 10 levels below 20% indicate an INR greater than 3 and   levels above 40% indicate an INR less than 2.         ASSESSMENT / PLAN  INR assessment THER    Recheck INR In: 1 WEEK    INR Location Outside lab      Anticoagulation Summary  As of 2019    INR goal:   2.0-3.0   TTR:   75.2 % (7 mo)   INR used for dosin.5 (2019)   Warfarin maintenance plan:   7.5 mg (5 mg x 1.5) every Mon; 5 mg (5 mg x 1) all other days   Full warfarin instructions:   7.5 mg every Mon; 5 mg all other days   Weekly warfarin total:   37.5 mg   Plan last modified:   Nasreen Kathleen RN (2019)   Next INR check:   2019   Priority:   INR   Target end date:   Indefinite    Indications    Heart failure (H) [I50.9]  LVAD (left ventricular assist device) present (H) [Z95.811]             Anticoagulation Episode Summary     INR check location:       Preferred lab:       Send INR reminders to:    [facility]    Comments:   LVAD implanted 18  3  ASA 81mg Daily Has 2.5mg tablets   [place] [facility] lkb=[phone].  Faxed S.O there on 2019 Follow INR's only no Chromogenic X's      Anticoagulation Care Providers     Provider Role Specialty Phone number    Jenni Ortiz MD [facility] Cardiology [phone]            See the Encounter Report to view Anticoagulation Flowsheet and Dosing Calendar (Go to Encounters tab in chart  review, and find the Anticoagulation Therapy Visit)  Spoke with patient.  Patient had LVAD placed on:   12/31/2018  Type of LVAD: HM3  Patient's current Aspirin dose: 81mg  LVAD Protocol followed: yes   Nasreen Kathleen RN

## 2019-08-27 ENCOUNTER — CARE COORDINATION (OUTPATIENT)
Dept: CARDIOLOGY | Facility: CLINIC | Age: 59
End: 2019-08-27

## 2019-08-27 NOTE — PROGRESS NOTES
Pt got f/u labs from most recent clinic visit. His creatinine improved from 1.82 to 1.54.  Called patient.  Instructed patient to call VAD Coordinator with any questions or concerns; verbalized understanding.

## 2019-09-01 DIAGNOSIS — I50.22 CHRONIC SYSTOLIC CONGESTIVE HEART FAILURE (H): ICD-10-CM

## 2019-09-01 DIAGNOSIS — Z95.811 LVAD (LEFT VENTRICULAR ASSIST DEVICE) PRESENT (H): ICD-10-CM

## 2019-09-04 ENCOUNTER — ANCILLARY PROCEDURE (OUTPATIENT)
Dept: CARDIOLOGY | Facility: CLINIC | Age: 59
End: 2019-09-04
Attending: INTERNAL MEDICINE
Payer: COMMERCIAL

## 2019-09-04 ENCOUNTER — ANTICOAGULATION THERAPY VISIT (OUTPATIENT)
Dept: ANTICOAGULATION | Facility: CLINIC | Age: 59
End: 2019-09-04

## 2019-09-04 VITALS
HEART RATE: 80 BPM | SYSTOLIC BLOOD PRESSURE: 100 MMHG | TEMPERATURE: 97.9 F | WEIGHT: 207.7 LBS | HEIGHT: 64 IN | BODY MASS INDEX: 35.46 KG/M2 | OXYGEN SATURATION: 100 %

## 2019-09-04 DIAGNOSIS — I50.22 CHRONIC SYSTOLIC CONGESTIVE HEART FAILURE (H): ICD-10-CM

## 2019-09-04 DIAGNOSIS — I25.10 CORONARY ARTERY DISEASE INVOLVING NATIVE CORONARY ARTERY OF NATIVE HEART WITHOUT ANGINA PECTORIS: ICD-10-CM

## 2019-09-04 DIAGNOSIS — I10 ESSENTIAL HYPERTENSION: ICD-10-CM

## 2019-09-04 DIAGNOSIS — I47.29 PAROXYSMAL VENTRICULAR TACHYCARDIA (H): ICD-10-CM

## 2019-09-04 DIAGNOSIS — I48.91 ATRIAL FIBRILLATION, UNSPECIFIED TYPE (H): ICD-10-CM

## 2019-09-04 DIAGNOSIS — I50.9 HEART FAILURE (H): ICD-10-CM

## 2019-09-04 DIAGNOSIS — I50.9 CONGESTIVE HEART FAILURE (CHF) (H): ICD-10-CM

## 2019-09-04 DIAGNOSIS — Z95.811 LVAD (LEFT VENTRICULAR ASSIST DEVICE) PRESENT (H): ICD-10-CM

## 2019-09-04 DIAGNOSIS — Z95.810 ICD (IMPLANTABLE CARDIOVERTER-DEFIBRILLATOR), BIVENTRICULAR, IN SITU: Primary | ICD-10-CM

## 2019-09-04 DIAGNOSIS — I50.23 ACUTE ON CHRONIC SYSTOLIC HEART FAILURE (H): ICD-10-CM

## 2019-09-04 DIAGNOSIS — I49.01 VENTRICULAR FIBRILLATION (H): ICD-10-CM

## 2019-09-04 DIAGNOSIS — Z95.811 LVAD (LEFT VENTRICULAR ASSIST DEVICE) PRESENT (H): Primary | ICD-10-CM

## 2019-09-04 DIAGNOSIS — Z79.01 LONG TERM (CURRENT) USE OF ANTICOAGULANTS: ICD-10-CM

## 2019-09-04 DIAGNOSIS — I50.22 CHRONIC SYSTOLIC CONGESTIVE HEART FAILURE (H): Primary | ICD-10-CM

## 2019-09-04 DIAGNOSIS — I50.810 RVF (RIGHT VENTRICULAR FAILURE) (H): ICD-10-CM

## 2019-09-04 LAB
ALBUMIN SERPL-MCNC: 3.7 G/DL (ref 3.4–5)
ALP SERPL-CCNC: 53 U/L (ref 40–150)
ALT SERPL W P-5'-P-CCNC: 40 U/L (ref 0–70)
ANION GAP SERPL CALCULATED.3IONS-SCNC: 7 MMOL/L (ref 3–14)
AST SERPL W P-5'-P-CCNC: 31 U/L (ref 0–45)
BILIRUB SERPL-MCNC: 0.8 MG/DL (ref 0.2–1.3)
BUN SERPL-MCNC: 33 MG/DL (ref 7–30)
CALCIUM SERPL-MCNC: 8.8 MG/DL (ref 8.5–10.1)
CHLORIDE SERPL-SCNC: 104 MMOL/L (ref 94–109)
CO2 SERPL-SCNC: 24 MMOL/L (ref 20–32)
CREAT SERPL-MCNC: 1.87 MG/DL (ref 0.66–1.25)
D DIMER PPP FEU-MCNC: 0.5 UG/ML FEU (ref 0–0.5)
ERYTHROCYTE [DISTWIDTH] IN BLOOD BY AUTOMATED COUNT: 13.9 % (ref 10–15)
GFR SERPL CREATININE-BSD FRML MDRD: 39 ML/MIN/{1.73_M2}
GLUCOSE SERPL-MCNC: 190 MG/DL (ref 70–99)
HCT VFR BLD AUTO: 40 % (ref 40–53)
HGB BLD-MCNC: 12.8 G/DL (ref 13.3–17.7)
INR PPP: 2.09 (ref 0.86–1.14)
LDH SERPL L TO P-CCNC: 132 U/L (ref 85–227)
MCH RBC QN AUTO: 29.5 PG (ref 26.5–33)
MCHC RBC AUTO-ENTMCNC: 32 G/DL (ref 31.5–36.5)
MCV RBC AUTO: 92 FL (ref 78–100)
PLATELET # BLD AUTO: 213 10E9/L (ref 150–450)
POTASSIUM SERPL-SCNC: 4.4 MMOL/L (ref 3.4–5.3)
PROT SERPL-MCNC: 7.1 G/DL (ref 6.8–8.8)
RBC # BLD AUTO: 4.34 10E12/L (ref 4.4–5.9)
SODIUM SERPL-SCNC: 136 MMOL/L (ref 133–144)
WBC # BLD AUTO: 7.9 10E9/L (ref 4–11)

## 2019-09-04 PROCEDURE — G0463 HOSPITAL OUTPT CLINIC VISIT: HCPCS | Mod: ZF

## 2019-09-04 PROCEDURE — 93750 INTERROGATION VAD IN PERSON: CPT | Mod: ZF | Performed by: NURSE PRACTITIONER

## 2019-09-04 PROCEDURE — 85379 FIBRIN DEGRADATION QUANT: CPT | Performed by: NURSE PRACTITIONER

## 2019-09-04 PROCEDURE — 85610 PROTHROMBIN TIME: CPT | Performed by: NURSE PRACTITIONER

## 2019-09-04 PROCEDURE — 85027 COMPLETE CBC AUTOMATED: CPT | Performed by: NURSE PRACTITIONER

## 2019-09-04 PROCEDURE — 83615 LACTATE (LD) (LDH) ENZYME: CPT | Performed by: NURSE PRACTITIONER

## 2019-09-04 PROCEDURE — 36415 COLL VENOUS BLD VENIPUNCTURE: CPT | Performed by: NURSE PRACTITIONER

## 2019-09-04 PROCEDURE — 80053 COMPREHEN METABOLIC PANEL: CPT | Performed by: NURSE PRACTITIONER

## 2019-09-04 PROCEDURE — 99214 OFFICE O/P EST MOD 30 MIN: CPT | Mod: ZP | Performed by: NURSE PRACTITIONER

## 2019-09-04 RX ORDER — CARVEDILOL 12.5 MG/1
TABLET ORAL
Qty: 90 TABLET | Refills: 3 | Status: SHIPPED | OUTPATIENT
Start: 2019-09-04 | End: 2020-01-07

## 2019-09-04 RX ORDER — LOSARTAN POTASSIUM 25 MG/1
25 TABLET ORAL DAILY
Qty: 30 TABLET | Refills: 11 | Status: SHIPPED | OUTPATIENT
Start: 2019-09-04 | End: 2019-09-04

## 2019-09-04 RX ORDER — LOSARTAN POTASSIUM 25 MG/1
25 TABLET ORAL DAILY
Qty: 30 TABLET | Refills: 11 | Status: ON HOLD | OUTPATIENT
Start: 2019-09-04 | End: 2020-05-15

## 2019-09-04 ASSESSMENT — PAIN SCALES - GENERAL: PAINLEVEL: NO PAIN (0)

## 2019-09-04 ASSESSMENT — MIFFLIN-ST. JEOR: SCORE: 1683.12

## 2019-09-04 NOTE — NURSING NOTE
1). PUMP DATA  Primary controller serial number: [id number]    HM 3:   Flow: 2.7 L/min,    Speed: 5300 RPMs,     PI: 11.4 ,  Power: 4 Parker, Hct: 40 (set at 20 due to low flow alarms)     Primary controller   Back up battery: Patient use: 25, Replace in: 19  Months     Data downloaded: No   Equipment and driveline assessed for damage: Yes     Back up : Serial number: [id number]  Back up battery: Patient use: 7 Replace in: 25  Months  Programmed settings identical to the settings on the primary controller :Yes      Education complete: Yes   Charge the BACKUP controller s backup battery every 6 months  Perform a self test on BACKUP every 6 months  Change the MPU s batteries every 6 months:Yes    2). ALARMS  Alarms reported by patient since last pump evaluation: Yes - occasional low flow alarms, frequency decreased in last several months  Alarms or other finding noted during pump data history and alarm download: Yes - several PI events, with history less than 24hrs. PI's ranging from 2-11, occasional speed drops to 5150. Pt is usually asymptomatic, but reports occasional dizziness (frequency and severity significantly reduced).    Action Taken:  Reviewed data with patient: Yes      3). DRESSING CHANGE / DRIVELINE ASSESSMENT  Dressing change completed today: No  Appearance of Driveline site: c/d/i per pt report, weekly dressing    Driveline stabilization: Method: [employer]  [ Teaching reinforced on need for stabilization of Driveline. ]    4).Pt. Education  D:  Pt education provided.  I:  Educated on MyChart  1.  How to message VAD Coordinator (send to MD but address to VAD Coordinator)  2. How to send photo via My Chart  3. When to use MyChart vs Call VAD Coordinator on Call.    A:  Pt verbalized understanding.  Pt is already sign(ed) up for MY Chart.    P:  VAD Coordinator available for questions or concerns.  Will continue VAD education.

## 2019-09-04 NOTE — PROGRESS NOTES
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  He returns for routine follow up.  He last saw Dr. Ortiz in the beginning of August.  At that visit he was doing well and no medication adjustments were made.    Since his last visit, Red has been still experiencing lightheadedness and dizziness after taking his pills.  The symptoms occur 3-4 times weekly and have improved as he is no longer having symptoms daily.      He is able to walk 5 miles without difficulty.  He   denies SOB at rest, PND, orthopnea, edema, chest pain, palpitations, or lightheadedness.  Overall he feels better.  He is eating more, and has gained weight. He still gets fatigued as the day goes on and naps daily.      He has been compliant with taking all of his medications.  He has developed a dry cough and would like to try an alternative to lisinopril.     He has no LVAD concerns today. Denies HA, neurological changes, hematuria, hematochezia, melena, epistaxis, fever, chills or any concerns for driveline infection.        PAST MEDICAL HISTORY:  Past Medical History:   Diagnosis Date     Acute kidney injury (H)      CKD (chronic kidney disease)      Coronary artery disease      Diabetes mellitus (H)      HTN (hypertension)      Hyperlipidemia      Ischemic cardiomyopathy      LV (left ventricular) mural thrombus      Paroxysmal atrial flutter (H)      RVF (right ventricular failure) (H)      Systolic heart failure (H)      Ventricular tachycardia (H)        FAMILY HISTORY:  No family history on file.    SOCIAL HISTORY:  Social History     Socioeconomic History     Marital status: Single     " Spouse name: None     Number of children: None     Years of education: None     Highest education level: None   Occupational History     None   Social Needs     Financial resource strain: None     Food insecurity:     Worry: None     Inability: None     Transportation needs:     Medical: None     Non-medical: None   Tobacco Use     Smoking status: Never Smoker     Smokeless tobacco: Never Used   Substance and Sexual Activity     Alcohol use: No     Frequency: Never     Drug use: No     Sexual activity: None   Lifestyle     Physical activity:     Days per week: None     Minutes per session: None     Stress: None   Relationships     Social connections:     Talks on phone: None     Gets together: None     Attends Mosque service: None     Active member of club or organization: None     Attends meetings of clubs or organizations: None     Relationship status: None     Intimate partner violence:     Fear of current or ex partner: None     Emotionally abused: None     Physically abused: None     Forced sexual activity: None   Other Topics Concern     None   Social History Narrative     None       CURRENT MEDICATIONS:  Current Outpatient Medications   Medication Sig Dispense Refill     amiodarone (PACERONE/CODARONE) 200 MG tablet Take 1 tablet (200 mg) by mouth daily 90 tablet 3     aspirin (ASA) 81 MG chewable tablet Take 1 tablet (81 mg) by mouth daily 90 tablet 3     carvedilol (COREG) 12.5 MG tablet Take 1 tablet (12.5 mg) by mouth 2 times daily (with meals) 60 tablet 3     digoxin (LANOXIN) 125 MCG tablet Take 1 tablet (125 mcg) by mouth daily 90 tablet 3     famotidine (PEPCID) 20 MG tablet Take 1 tablet (20 mg) by mouth 2 times daily 180 tablet 3     hydrALAZINE (APRESOLINE) 50 MG tablet Take 2 tablets (100 mg) by mouth 3 times daily 90 tablet 3     insulin aspart (NOVOLOG PEN) 100 UNIT/ML pen Prandial Dosin units per 7 grams of carbohydrate each Meal or Snack.  Only chart total amount of units given.  Do not  give if pre-prandial glucose is less than 60 mg/dL. If given at mealtime, administer within 30 minutes of start of meal. 3 mL 1     insulin glargine (LANTUS SOLOSTAR PEN) 100 UNIT/ML pen Inject 24 Units Subcutaneous At Bedtime 3 mL 11     lisinopril (PRINIVIL/ZESTRIL) 10 MG tablet Take 1 tablet (10 mg) by mouth daily 30 tablet 1     rosuvastatin (CRESTOR) 20 MG tablet Take 1 tablet (20 mg) by mouth At Bedtime 90 tablet 3     warfarin (COUMADIN) 2.5 MG tablet Take two to three tablets daily or as directed by the Coumadin clinic 150 tablet 5     insulin aspart (NOVOLOG PEN) 100 UNIT/ML pen Inject 1-10 Units Subcutaneous 3 times daily (before meals) Correction Scale - HIGH INSULIN RESISTANCE DOSING     -164 =1 units.   -189 =2.   -214 =3.   -239 =4.   -264 =5.   -289 =6.   -314 =7.   -339 =8.   -364 =9.  BG greater than or equal to 365 give 10 units  To be given with meal insulin, based on pre-meal BG 9 mL 0     insulin aspart (NOVOLOG PEN) 100 UNIT/ML pen Inject 1-7 Units Subcutaneous At Bedtime HIGH INSULIN RESISTANCE DOSING    Bedtime  For  - 224 give 1 units.   For  - 249 give 2 units.   For  - 274 give 3 units.   For  - 299 give 4 units.   For  - 324 give 5 units.   For  - 349 give 6 units.   For BG greater than or equal to 350 give 7 units. (Patient not taking: Reported on 2/11/2019) 2.1 mL 0     nitroGLYcerin (NITROSTAT) 0.4 MG sublingual tablet Place 1 tablet (0.4 mg) under the tongue every 5 minutes as needed for chest pain For chest pain place 1 tablet under the tongue every 5 minutes for 3 doses. If symptoms persist 5 minutes after 1st dose call 911. 10 tablet 0       ROS:  Constitutional: Denies fever, chills, or diaphoresis. +Weight gain and fatigue.   Respiratory:  See HPI.     Cardiovascular: As per HPI.   GI: Denies nausea, vomiting, diarrhea, hematemesis, melena, or hematochezia.   : Hematuria only with heavy  "exertion, unchanged.   Integument: Negative for bruising, rash, or pruritis. See HPI.  Psychiatric: Negative for mood changes.   Neuro: Negative for headaches, syncope, numbness or tingling.   Endocrinology: +DM.   Musculoskeletal: Negative for gout, joint stiffness, swelling, or muscle weakness    EXAM:  BP (!) 100/0 (BP Location: Left arm, Patient Position: Chair, Cuff Size: Adult Regular)   Pulse 80   Temp 97.9  F (36.6  C)   Ht 1.626 m (5' 4\")   Wt 94.2 kg (207 lb 11.2 oz)   SpO2 100%   BMI 35.65 kg/m    General: alert, articulate, and in no acute distress.  HEENT: normocephalic, atraumatic, anicteric sclera, EOMI, normal palate, mucosa moist,   no cyanosis.    Neck: neck supple. JVP just above clavicle at 45 degree angle.  Heart: LVAD hum present   Lungs: clear, no rales, ronchi, or wheezing.  No accessory muscle use, respirations unlabored.   Abdomen: soft, non-tender, bowel sounds present, no hepatomegaly  Extremities: No clubbing, cyanosis or edema.  No palpable pedal pulses.  Neurological: Alert and oriented x 3.  Normal speech and affect, no gross motor deficits  Skin:  No ecchymoses, rashes, or clubbing.  Driveline dressing CDI.     Labs:  CBC RESULTS:  Lab Results   Component Value Date    WBC 7.9 09/04/2019    RBC 4.34 (L) 09/04/2019    HGB 12.8 (L) 09/04/2019    HCT 40.0 09/04/2019    MCV 92 09/04/2019    MCH 29.5 09/04/2019    MCHC 32.0 09/04/2019    RDW 13.9 09/04/2019     09/04/2019       CMP RESULTS:  Lab Results   Component Value Date     09/04/2019    POTASSIUM 4.4 09/04/2019    CHLORIDE 104 09/04/2019    CO2 24 09/04/2019    ANIONGAP 7 09/04/2019     (H) 09/04/2019    BUN 33 (H) 09/04/2019    CR 1.87 (H) 09/04/2019    GFRESTIMATED 39 (L) 09/04/2019    GFRESTBLACK 45 (L) 09/04/2019    ARLENE 8.8 09/04/2019    BILITOTAL 0.8 09/04/2019    ALBUMIN 3.7 09/04/2019    ALKPHOS 53 09/04/2019    ALT 40 09/04/2019    AST 31 09/04/2019        INR RESULTS:  Lab Results   Component " Value Date    INR 2.09 (H) 09/04/2019       No components found for: CK  Lab Results   Component Value Date    MAG 1.8 07/08/2019     Lab Results   Component Value Date    NTBNP 404 (H) 06/26/2019       Most recent echocardiogram 7/5/19:  Interpretation Summary  HM 3 at 5300RPM.  Severely (EF 20-30%) reduced left ventricular function is present. LVIDd:4.6  cm. Septum midline.  Left ventricular size is normal.  Global right ventricular function is mildly reduced.  Moderate right ventricular dilation is present.  AV is not seen well, No AI.  Inflow velocity cannot be assessed. Outflow velocity is normal.  The inferior vena cava was normal in size with preserved respiratory  variability.  No pericardial effusion is present.     This study was compared with the study from 6/26/2019 .There has been no  change including RV function.    Device check today:  Patient seen in clinic for evaluation and iterative programming of [employer] Dynagen single lead ICD per MD orders. Patient has an appointment to see CORDELL Rhodes today. Normal ICD function. no episodes recorded.  No arrhythmias recorded. Intrinsic rhythm = vs @ 50 bpm.  = <1%.  Estimated battery longevity to QUIQUE = 12 years.  No short v-v intervals recorded. RV lead impedance trends appear stable. Patient reports that he is feeling well and denies complaints. Plan for patient to return to clinic in 3 months.  TRISTIAN Easley RN     Assessment and Plan:    Mariusz Sharp is a 58 year old gentleman with ICM s/p HM III LVAD placement and RV failure who feels well today. His MAP is elevated at  100 today.  I increased his carvedilol to 25 mg in the morning and 12.5 mg in the evening.  I also changed his lisinopril to losartan to see if his cough resolves.    His renal function has worsened with a creatinine of 1.87 with an elevated BUN of 33.  Previously his creatinine was 1.54.  I recommended holding his spironolactone.  He will repeat a BMP in 1 to 2  weeks.    We did have a long discussion about his weight today.  He is aware that he needs to have a BMI of 35 or less to remain active on the transplant list.  I recommended that he work on weight loss and decreasing his food intake, which she is agreeable to.    1.  Chronic sytolic heart failure secondary to ICM.  Stage D  NYHA Class II  ACEi/ARB/Afterload: yes, Hydralazine 100 mg three times daily. Start Losartan 25 mg daily. Lisinopril discontinued secondary to cough.  BB: Carvedilol 25 mg in the am and 12.5 mg in the afternoon, titrate cautiously with mild RV failure.  Aldosterone antagonist: contraindicated due to renal dysfunction  SCD prophylaxis: ICD  Fluid status: Euvolemic    2.  S/P LVAD implant as BTT   Anticoagulation: Warfarin. INR goal 2-3.  INR today 2.09.   Antiplatelet:  ASA 81 mg daily.   MAP:  Elevated at 100.  Increasing Carvedilol as outlined above.   LDH:  Improving.  132 today.  Previously 146.  VAD Interrogation today: VAD interrogation reviewed with VAD coordinator. Agree with findings. Several PI events ranging 2-11, occasional speed drops to 5150.  No power spikes, or other findings suspicious of pump malfunction noted.     3.  CAD:  Stable.  Continue Carvedilol, Rosuvastatin, Hydralazine, and ASA.     4.  Afib/VT:   Chads Vasc score: 4.  Continue Warfarin with INR goal 2-3. Continue amiodarone and Carvedilol for rate control. Amiodarone screening is up to date, but is due for PFT.  Schedule with next visit.      5. CKD: Renal function is elevated. Creatinine today 1.87. Previously 1.54. Hold spironolactone.  Repeat BMP in 1-2 weeks.     6.  RV failure:  Continue digoxin.  Last digoxin level 1.0 in July. Repeat digoxin level with next blood draw.      7.  Follow-up:  Dr. Otriz in December with PFT's.       Pippa MORALES CNP  Advanced Heart Failure/LVAD clinic

## 2019-09-04 NOTE — NURSING NOTE
Chief Complaint   Patient presents with     Follow Up     3 month follow up      Vitals were taken and medications were reconciled.  Marisela Naylor  1:59 PM

## 2019-09-04 NOTE — PATIENT INSTRUCTIONS
Medications:  1. STOP taking Lisnipril  2. START taking Losartan 25mg daily  3. INCREASE Carvedilol (Coreg) to 25mg in the morning and 12.5mg in the evening.     Follow-up:  1.  Dr. Ortiz in 3 months  2. NP in 6 months    Instructions:  1. Get labs drawn in 1 week, metabolic panel and digoxin level  2. Call Marcy in 2 weeks to check in on your blood pressure.     Page the VAD Coordinator on call if you gain more than 3 lb in a day or 5 in a week. Please also page if you feel unwell or have alarms.     Great to see you in clinic today. To Page the VAD Coordinator on call, dial [phone] option #4 and ask to speak to the VAD coordinator on call.

## 2019-09-04 NOTE — LETTER
Patient Name: Mariusz Sharp   : 1960   Diagnosis/ICD-10: Heart Failure, unspecified I50.9; LVAD 295.811   Requesting Physician: Dr. Jenni Ortiz   Date of Request: 19   Date to Draw 19     **Please fax results to Marcy Shahid RN VAD Coord at [phone].                Call [phone] with Questions      ORDER CODE TESTS YOHANA.VOL.   X CBASIC Na, K, Cl, CO2, Crea, BUN, Glu, Ca GG 0.5-1    BHEPAT Alb, AlkP, ALT, AST, BBil, TP GG 0.5-1    BLIP Chol, Trig, HDL, LDL GG 1-2    CCOMP Na, K, Cl, CO2, Crea, BUN, Glu, Ca, Alb, AlkP, ALT, AST, Bbil, TP,  GG 0.6-1    HGP CBC & Platelet P 0.3-1    HGDP CBC, Differential & Platelet P 0.3-1    PLT Platelet  P 0.3-1    INR INR B 2.7    PTT PTT B 2.7    LD Lactate Dehydrogenase GG 0.3-1    PHGB Plasma Hemoglobin GG 2-3    Pre-Albumin Pre-Albumin RG 1.0   X Dig Digoxin level             Signed,      Jenni Ortiz MD  Heart Failure, Mechanical Circulatory Support and Transplant Cardiology   of Medicine,  Division of Cardiology, [facility]

## 2019-09-04 NOTE — PATIENT INSTRUCTIONS
It was a pleasure to see you in clinic today.  Please do not hesitate to call with any questions or concerns.  We look forward to seeing you in clinic at your next device check in 3 months.    Federico Easley, GERMAN  Electrophysiology Nurse Clinician  [facility]    During Business Hours Please Call:  [phone]  After Hours Please Call:  [phone] - select option #4 and ask for job code [phone]

## 2019-09-04 NOTE — LETTER
"9/4/2019      RE: Mariusz Sharp  [address] [facility] [address]       Dear Colleague,    Thank you for the opportunity to participate in the care of your patient, Mariusz Sharp, at the [facility] at [facility]. Please see a copy of my visit note below.    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    He returns for routine follow up.  He last saw Dr. Ortiz in the beginning of August.  At that visit he was doing well and no medication adjustments were made.    Since his last visit, Red has been still experiencing lightheadedness and dizziness after taking his pills.  The symptoms occur 3-4 times weekly and have improved as he is no longer having symptoms daily.      He is able to walk 5 miles without difficulty.  He   denies SOB at rest, PND, orthopnea, edema, chest pain, palpitations, or lightheadedness.  Overall he feels better.  He is eating more, and has gained weight. He still gets fatigued as the day goes on and naps daily.      He has been compliant with taking all of his medications.  He has developed a dry cough and would like to try an alternative to lisinopril.     He has no LVAD concerns today. Denies HA, neurological changes, hematuria, hematochezia, melena, epistaxis, fever, chills or any concerns for driveline infection.        PAST MEDICAL HISTORY:  Past Medical History:   Diagnosis Date     Acute kidney injury (H)      CKD (chronic kidney disease)      Coronary artery disease      Diabetes mellitus (H)      HTN (hypertension)      Hyperlipidemia      Ischemic cardiomyopathy      LV (left " ventricular) mural thrombus      Paroxysmal atrial flutter (H)      RVF (right ventricular failure) (H)      Systolic heart failure (H)      Ventricular tachycardia (H)        FAMILY HISTORY:  No family history on file.    SOCIAL HISTORY:  Social History     Socioeconomic History     Marital status: Single     Spouse name: None     Number of children: None     Years of education: None     Highest education level: None   Occupational History     None   Social Needs     Financial resource strain: None     Food insecurity:     Worry: None     Inability: None     Transportation needs:     Medical: None     Non-medical: None   Tobacco Use     Smoking status: Never Smoker     Smokeless tobacco: Never Used   Substance and Sexual Activity     Alcohol use: No     Frequency: Never     Drug use: No     Sexual activity: None   Lifestyle     Physical activity:     Days per week: None     Minutes per session: None     Stress: None   Relationships     Social connections:     Talks on phone: None     Gets together: None     Attends Orthodoxy service: None     Active member of club or organization: None     Attends meetings of clubs or organizations: None     Relationship status: None     Intimate partner violence:     Fear of current or ex partner: None     Emotionally abused: None     Physically abused: None     Forced sexual activity: None   Other Topics Concern     None   Social History Narrative     None       CURRENT MEDICATIONS:  Current Outpatient Medications   Medication Sig Dispense Refill     amiodarone (PACERONE/CODARONE) 200 MG tablet Take 1 tablet (200 mg) by mouth daily 90 tablet 3     aspirin (ASA) 81 MG chewable tablet Take 1 tablet (81 mg) by mouth daily 90 tablet 3     carvedilol (COREG) 12.5 MG tablet Take 1 tablet (12.5 mg) by mouth 2 times daily (with meals) 60 tablet 3     digoxin (LANOXIN) 125 MCG tablet Take 1 tablet (125 mcg) by mouth daily 90 tablet 3     famotidine (PEPCID) 20 MG tablet Take 1 tablet (20  mg) by mouth 2 times daily 180 tablet 3     hydrALAZINE (APRESOLINE) 50 MG tablet Take 2 tablets (100 mg) by mouth 3 times daily 90 tablet 3     insulin aspart (NOVOLOG PEN) 100 UNIT/ML pen Prandial Dosin units per 7 grams of carbohydrate each Meal or Snack.  Only chart total amount of units given.  Do not give if pre-prandial glucose is less than 60 mg/dL. If given at mealtime, administer within 30 minutes of start of meal. 3 mL 1     insulin glargine (LANTUS SOLOSTAR PEN) 100 UNIT/ML pen Inject 24 Units Subcutaneous At Bedtime 3 mL 11     lisinopril (PRINIVIL/ZESTRIL) 10 MG tablet Take 1 tablet (10 mg) by mouth daily 30 tablet 1     rosuvastatin (CRESTOR) 20 MG tablet Take 1 tablet (20 mg) by mouth At Bedtime 90 tablet 3     warfarin (COUMADIN) 2.5 MG tablet Take two to three tablets daily or as directed by the Coumadin clinic 150 tablet 5     insulin aspart (NOVOLOG PEN) 100 UNIT/ML pen Inject 1-10 Units Subcutaneous 3 times daily (before meals) Correction Scale - HIGH INSULIN RESISTANCE DOSING     -164 =1 units.   -189 =2.   -214 =3.   -239 =4.   -264 =5.   -289 =6.   -314 =7.   -339 =8.   -364 =9.  BG greater than or equal to 365 give 10 units  To be given with meal insulin, based on pre-meal BG 9 mL 0     insulin aspart (NOVOLOG PEN) 100 UNIT/ML pen Inject 1-7 Units Subcutaneous At Bedtime HIGH INSULIN RESISTANCE DOSING    Bedtime  For  - 224 give 1 units.   For  - 249 give 2 units.   For  - 274 give 3 units.   For  - 299 give 4 units.   For  - 324 give 5 units.   For  - 349 give 6 units.   For BG greater than or equal to 350 give 7 units. (Patient not taking: Reported on 2019) 2.1 mL 0     nitroGLYcerin (NITROSTAT) 0.4 MG sublingual tablet Place 1 tablet (0.4 mg) under the tongue every 5 minutes as needed for chest pain For chest pain place 1 tablet under the tongue every 5 minutes for 3 doses. If symptoms  "persist 5 minutes after 1st dose call 911. 10 tablet 0       ROS:  Constitutional: Denies fever, chills, or diaphoresis. +Weight gain and fatigue.   Respiratory:  See HPI.     Cardiovascular: As per HPI.   GI: Denies nausea, vomiting, diarrhea, hematemesis, melena, or hematochezia.   : Hematuria only with heavy exertion, unchanged.   Integument: Negative for bruising, rash, or pruritis. See HPI.  Psychiatric: Negative for mood changes.   Neuro: Negative for headaches, syncope, numbness or tingling.   Endocrinology: +DM.   Musculoskeletal: Negative for gout, joint stiffness, swelling, or muscle weakness    EXAM:  BP (!) 100/0 (BP Location: Left arm, Patient Position: Chair, Cuff Size: Adult Regular)   Pulse 80   Temp 97.9  F (36.6  C)   Ht 1.626 m (5' 4\")   Wt 94.2 kg (207 lb 11.2 oz)   SpO2 100%   BMI 35.65 kg/m     General: alert, articulate, and in no acute distress.  HEENT: normocephalic, atraumatic, anicteric sclera, EOMI, normal palate, mucosa moist,   no cyanosis.    Neck: neck supple. JVP just above clavicle at 45 degree angle.  Heart: LVAD hum present   Lungs: clear, no rales, ronchi, or wheezing.  No accessory muscle use, respirations unlabored.   Abdomen: soft, non-tender, bowel sounds present, no hepatomegaly  Extremities: No clubbing, cyanosis or edema.  No palpable pedal pulses.  Neurological: Alert and oriented x 3.  Normal speech and affect, no gross motor deficits  Skin:  No ecchymoses, rashes, or clubbing.  Driveline dressing CDI.     Labs:  CBC RESULTS:  Lab Results   Component Value Date    WBC 7.9 09/04/2019    RBC 4.34 (L) 09/04/2019    HGB 12.8 (L) 09/04/2019    HCT 40.0 09/04/2019    MCV 92 09/04/2019    MCH 29.5 09/04/2019    MCHC 32.0 09/04/2019    RDW 13.9 09/04/2019     09/04/2019       CMP RESULTS:  Lab Results   Component Value Date     09/04/2019    POTASSIUM 4.4 09/04/2019    CHLORIDE 104 09/04/2019    CO2 24 09/04/2019    ANIONGAP 7 09/04/2019     (H) " 09/04/2019    BUN 33 (H) 09/04/2019    CR 1.87 (H) 09/04/2019    GFRESTIMATED 39 (L) 09/04/2019    GFRESTBLACK 45 (L) 09/04/2019    ARLENE 8.8 09/04/2019    BILITOTAL 0.8 09/04/2019    ALBUMIN 3.7 09/04/2019    ALKPHOS 53 09/04/2019    ALT 40 09/04/2019    AST 31 09/04/2019        INR RESULTS:  Lab Results   Component Value Date    INR 2.09 (H) 09/04/2019       No components found for: CK  Lab Results   Component Value Date    MAG 1.8 07/08/2019     Lab Results   Component Value Date    NTBNP 404 (H) 06/26/2019       Most recent echocardiogram 7/5/19:  Interpretation Summary  HM 3 at 5300RPM.  Severely (EF 20-30%) reduced left ventricular function is present. LVIDd:4.6  cm. Septum midline.  Left ventricular size is normal.  Global right ventricular function is mildly reduced.  Moderate right ventricular dilation is present.  AV is not seen well, No AI.  Inflow velocity cannot be assessed. Outflow velocity is normal.  The inferior vena cava was normal in size with preserved respiratory  variability.  No pericardial effusion is present.     This study was compared with the study from 6/26/2019 .There has been no  change including RV function.    Device check today:  Patient seen in clinic for evaluation and iterative programming of [employer]agen single lead ICD per MD orders. Patient has an appointment to see CORDELL Rhodes today. Normal ICD function. no episodes recorded.  No arrhythmias recorded. Intrinsic rhythm = vs @ 50 bpm.  = <1%.  Estimated battery longevity to QUIQUE = 12 years.  No short v-v intervals recorded. RV lead impedance trends appear stable. Patient reports that he is feeling well and denies complaints. Plan for patient to return to clinic in 3 months.  TRISTIAN Easley RN     Assessment and Plan:    Mariusz Sharp is a 58 year old gentleman with ICM s/p HM III LVAD placement and RV failure who feels well today. His MAP is elevated at  100 today.  I increased his carvedilol to 25 mg in the  morning and 12.5 mg in the evening.  I also changed his lisinopril to losartan to see if his cough resolves.    His renal function has worsened with a creatinine of 1.87 with an elevated BUN of 33.  Previously his creatinine was 1.54.  I recommended holding his spironolactone.  He will repeat a BMP in 1 to 2 weeks.    We did have a long discussion about his weight today.  He is aware that he needs to have a BMI of 35 or less to remain active on the transplant list.  I recommended that he work on weight loss and decreasing his food intake, which she is agreeable to.    1.  Chronic sytolic heart failure secondary to ICM.  Stage D  NYHA Class II  ACEi/ARB/Afterload: yes, Hydralazine 100 mg three times daily. Start Losartan 25 mg daily. Lisinopril discontinued secondary to cough.  BB: Carvedilol 25 mg in the am and 12.5 mg in the afternoon, titrate cautiously with mild RV failure.  Aldosterone antagonist: contraindicated due to renal dysfunction  SCD prophylaxis: ICD  Fluid status: Euvolemic    2.  S/P LVAD implant as BTT   Anticoagulation: Warfarin. INR goal 2-3.  INR today 2.09.   Antiplatelet:  ASA 81 mg daily.   MAP:  Elevated at 100.  Increasing Carvedilol as outlined above.   LDH:  Improving.  132 today.  Previously 146.  VAD Interrogation today: VAD interrogation reviewed with VAD coordinator. Agree with findings. Several PI events ranging 2-11, occasional speed drops to 5150.  No power spikes, or other findings suspicious of pump malfunction noted.     3.  CAD:  Stable.  Continue Carvedilol, Rosuvastatin, Hydralazine, and ASA.     4.  Afib/VT:   Chads Vasc score: 4.  Continue Warfarin with INR goal 2-3. Continue amiodarone and Carvedilol for rate control. Amiodarone screening is up to date, but is due for PFT.  Schedule with next visit.      5. CKD: Renal function is elevated. Creatinine today 1.87. Previously 1.54. Hold spironolactone.  Repeat BMP in 1-2 weeks.     6.  RV failure:  Continue digoxin.  Last  digoxin level 1.0 in July. Repeat digoxin level with next blood draw.      7.  Follow-up:    Dr. Ortiz in December with PFT's.       Pippa MORALES CNP  Advanced Heart Failure/LVAD clinic

## 2019-09-04 NOTE — PROGRESS NOTES
ANTICOAGULATION FOLLOW-UP CLINIC VISIT    Patient Name:  Mariusz Sharp  Date:  2019  Contact Type:  Telephone    SUBJECTIVE:  Patient Findings     Comments:   Red reports no changes in health, diet, medications.        Clinical Outcomes     Comments:   Red reports no changes in health, diet, medications.           OBJECTIVE    INR   Date Value Ref Range Status   2019 2.09 (H) 0.86 - 1.14 Final     Chromogenic Factor 10   Date Value Ref Range Status   2019 72 70 - 130 % Final     Comment:     Therapeutic Range:  A Chromogenic Factor 10 level of approximately 20-40%   inversely correlates with an INR of 2-3 for patients receiving Warfarin.   Chromogenic Factor 10 levels below 20% indicate an INR greater than 3 and   levels above 40% indicate an INR less than 2.         ASSESSMENT / PLAN  INR assessment THER    Recheck INR In: 1 WEEK    INR Location Clinic      Anticoagulation Summary  As of 2019    INR goal:   2.0-3.0   TTR:   76.6 % (7.4 mo)   INR used for dosin.09 (2019)   Warfarin maintenance plan:   7.5 mg (5 mg x 1.5) every Mon; 5 mg (5 mg x 1) all other days   Full warfarin instructions:   7.5 mg every Mon; 5 mg all other days   Weekly warfarin total:   37.5 mg   No change documented:   Jovana Ruelas RN   Plan last modified:   Nasreen Kathleen RN (2019)   Next INR check:   2019   Priority:   INR   Target end date:   Indefinite    Indications    Heart failure (H) [I50.9]  LVAD (left ventricular assist device) present (H) [Z95.811]             Anticoagulation Episode Summary     INR check location:       Preferred lab:       Send INR reminders to:    [facility]    Comments:   LVAD implanted 18 HM 3  ASA 81mg Daily Has 2.5mg tablets   [place] lab vaz=[phone].  Faxed S.O there on 2019 Follow INR's only no Chromogenic X's      Anticoagulation Care Providers     Provider Role Specialty Phone number    Jenni Ortiz MD Responsible  Cardiology [phone]            See the Encounter Report to view Anticoagulation Flowsheet and Dosing Calendar (Go to Encounters tab in chart review, and find the Anticoagulation Therapy Visit)    Spoke with Red.  He reports no changes in health, diet, medications.      Patient had LVAD placed on:   12/31/18  Type of LVAD: HM 3  Patient's current Aspirin dose: 81 mg daily  LVAD Protocol followed: Yes    Jovana Ruelas RN

## 2019-09-05 RX ORDER — CARVEDILOL 12.5 MG/1
TABLET ORAL
Qty: 60 TABLET | Refills: 0 | OUTPATIENT
Start: 2019-09-05

## 2019-09-05 RX ORDER — HYDRALAZINE HYDROCHLORIDE 50 MG/1
100 TABLET, FILM COATED ORAL 3 TIMES DAILY
Qty: 180 TABLET | Refills: 5 | Status: SHIPPED | OUTPATIENT
Start: 2019-09-05 | End: 2019-12-04

## 2019-09-06 DIAGNOSIS — I50.22 CHRONIC SYSTOLIC CONGESTIVE HEART FAILURE (H): Primary | ICD-10-CM

## 2019-09-06 DIAGNOSIS — Z95.811 LVAD (LEFT VENTRICULAR ASSIST DEVICE) PRESENT (H): ICD-10-CM

## 2019-09-07 LAB
MDC_IDC_LEAD_IMPLANT_DT: NORMAL
MDC_IDC_LEAD_IMPLANT_DT: NORMAL
MDC_IDC_LEAD_LOCATION: NORMAL
MDC_IDC_LEAD_LOCATION: NORMAL
MDC_IDC_LEAD_LOCATION_DETAIL_1: NORMAL
MDC_IDC_LEAD_LOCATION_DETAIL_1: NORMAL
MDC_IDC_LEAD_MFG: NORMAL
MDC_IDC_LEAD_MFG: NORMAL
MDC_IDC_LEAD_MODEL: NORMAL
MDC_IDC_LEAD_MODEL: NORMAL
MDC_IDC_LEAD_POLARITY_TYPE: NORMAL
MDC_IDC_LEAD_POLARITY_TYPE: NORMAL
MDC_IDC_LEAD_SERIAL: NORMAL
MDC_IDC_LEAD_SERIAL: NORMAL
MDC_IDC_LEAD_SPECIAL_FUNCTION: NORMAL
MDC_IDC_LEAD_SPECIAL_FUNCTION: NORMAL
MDC_IDC_MSMT_BATTERY_STATUS: NORMAL
MDC_IDC_MSMT_CAP_CHARGE_DTM: NORMAL
MDC_IDC_MSMT_CAP_CHARGE_ENERGY: 41 J
MDC_IDC_MSMT_CAP_CHARGE_TIME: 8.39
MDC_IDC_MSMT_CAP_CHARGE_TIME: 9.44 S
MDC_IDC_MSMT_CAP_CHARGE_TYPE: NORMAL
MDC_IDC_MSMT_CAP_CHARGE_TYPE: NORMAL
MDC_IDC_MSMT_LEADCHNL_RV_IMPEDANCE_VALUE: 1133 OHM
MDC_IDC_MSMT_LEADCHNL_RV_PACING_THRESHOLD_AMPLITUDE: 1 V
MDC_IDC_MSMT_LEADCHNL_RV_PACING_THRESHOLD_PULSEWIDTH: 1 MS
MDC_IDC_MSMT_LEADCHNL_RV_SENSING_INTR_AMPL: 18.8 MV
MDC_IDC_PG_IMPLANT_DTM: NORMAL
MDC_IDC_PG_MFG: NORMAL
MDC_IDC_PG_MODEL: NORMAL
MDC_IDC_PG_SERIAL: NORMAL
MDC_IDC_PG_TYPE: NORMAL
MDC_IDC_SESS_CLINIC_NAME: NORMAL
MDC_IDC_SESS_DTM: NORMAL
MDC_IDC_SESS_TYPE: NORMAL
MDC_IDC_SET_BRADY_HYSTRATE: NORMAL
MDC_IDC_SET_BRADY_LOWRATE: 40 {BEATS}/MIN
MDC_IDC_SET_BRADY_MODE: NORMAL
MDC_IDC_SET_LEADCHNL_RV_PACING_AMPLITUDE: 2.2 V
MDC_IDC_SET_LEADCHNL_RV_PACING_ANODE_ELECTRODE_1: NORMAL
MDC_IDC_SET_LEADCHNL_RV_PACING_ANODE_LOCATION_1: NORMAL
MDC_IDC_SET_LEADCHNL_RV_PACING_CAPTURE_MODE: NORMAL
MDC_IDC_SET_LEADCHNL_RV_PACING_CATHODE_ELECTRODE_1: NORMAL
MDC_IDC_SET_LEADCHNL_RV_PACING_CATHODE_LOCATION_1: NORMAL
MDC_IDC_SET_LEADCHNL_RV_PACING_POLARITY: NORMAL
MDC_IDC_SET_LEADCHNL_RV_PACING_PULSEWIDTH: 1 MS
MDC_IDC_SET_LEADCHNL_RV_SENSING_ADAPTATION_MODE: NORMAL
MDC_IDC_SET_LEADCHNL_RV_SENSING_ANODE_ELECTRODE_1: NORMAL
MDC_IDC_SET_LEADCHNL_RV_SENSING_ANODE_LOCATION_1: NORMAL
MDC_IDC_SET_LEADCHNL_RV_SENSING_CATHODE_ELECTRODE_1: NORMAL
MDC_IDC_SET_LEADCHNL_RV_SENSING_CATHODE_LOCATION_1: NORMAL
MDC_IDC_SET_LEADCHNL_RV_SENSING_POLARITY: NORMAL
MDC_IDC_SET_LEADCHNL_RV_SENSING_SENSITIVITY: 0.6 MV
MDC_IDC_SET_ZONE_DETECTION_INTERVAL: 300 MS
MDC_IDC_SET_ZONE_DETECTION_INTERVAL: 375 MS
MDC_IDC_SET_ZONE_TYPE: NORMAL
MDC_IDC_SET_ZONE_VENDOR_TYPE: NORMAL
MDC_IDC_STAT_BRADY_RV_PERCENT_PACED: 1 %
MDC_IDC_STAT_EPISODE_RECENT_COUNT: 0
MDC_IDC_STAT_EPISODE_RECENT_COUNT_DTM_END: NORMAL
MDC_IDC_STAT_EPISODE_RECENT_COUNT_DTM_START: NORMAL
MDC_IDC_STAT_EPISODE_TOTAL_COUNT: 0
MDC_IDC_STAT_EPISODE_TOTAL_COUNT: 49
MDC_IDC_STAT_EPISODE_TOTAL_COUNT: 6
MDC_IDC_STAT_EPISODE_TOTAL_COUNT_DTM_END: NORMAL
MDC_IDC_STAT_EPISODE_TYPE: NORMAL
MDC_IDC_STAT_EPISODE_VENDOR_TYPE: NORMAL
MDC_IDC_STAT_TACHYTHERAPY_ATP_DELIVERED_RECENT: 0
MDC_IDC_STAT_TACHYTHERAPY_ATP_DELIVERED_TOTAL: 9
MDC_IDC_STAT_TACHYTHERAPY_RECENT_DTM_END: NORMAL
MDC_IDC_STAT_TACHYTHERAPY_RECENT_DTM_START: NORMAL
MDC_IDC_STAT_TACHYTHERAPY_SHOCKS_ABORTED_RECENT: 0
MDC_IDC_STAT_TACHYTHERAPY_SHOCKS_ABORTED_TOTAL: 0
MDC_IDC_STAT_TACHYTHERAPY_SHOCKS_DELIVERED_RECENT: 0
MDC_IDC_STAT_TACHYTHERAPY_SHOCKS_DELIVERED_TOTAL: 5
MDC_IDC_STAT_TACHYTHERAPY_TOTAL_DTM_END: NORMAL

## 2019-09-11 ENCOUNTER — ANTICOAGULATION THERAPY VISIT (OUTPATIENT)
Dept: ANTICOAGULATION | Facility: CLINIC | Age: 59
End: 2019-09-11

## 2019-09-11 DIAGNOSIS — Z95.811 LVAD (LEFT VENTRICULAR ASSIST DEVICE) PRESENT (H): ICD-10-CM

## 2019-09-11 DIAGNOSIS — I50.9 HEART FAILURE (H): ICD-10-CM

## 2019-09-11 LAB
ANION GAP SERPL CALCULATED.3IONS-SCNC: 9 MMOL/L
BUN SERPL-MCNC: 25 MG/DL
CALCIUM SERPL-MCNC: 8.3 MG/DL
CHLORIDE SERPLBLD-SCNC: 106 MMOL/L
CO2 SERPL-SCNC: 25 MMOL/L
CREAT SERPL-MCNC: 1.39 MG/DL
DIGOXIN SERPL-MCNC: 1.3 UG/L
GFR SERPL CREATININE-BSD FRML MDRD: 53 ML/MIN/1.73M2
GLUCOSE SERPL-MCNC: 221 MG/DL (ref 70–99)
INR PPP: 3.7
POTASSIUM SERPL-SCNC: 4.3 MMOL/L
SODIUM SERPL-SCNC: 140 MMOL/L

## 2019-09-11 NOTE — PROGRESS NOTES
ANTICOAGULATION FOLLOW-UP CLINIC VISIT    Patient Name:  Mariusz Sharp  Date:  9/11/2019  Contact Type:  Telephone    SUBJECTIVE:  Patient Findings     Comments:   Increased dose of Coreg        Clinical Outcomes     Comments:   Increased dose of Coreg           OBJECTIVE    INR   Date Value Ref Range Status   09/11/2019 3.7  Final     Chromogenic Factor 10   Date Value Ref Range Status   01/11/2019 72 70 - 130 % Final     Comment:     Therapeutic Range:  A Chromogenic Factor 10 level of approximately 20-40%   inversely correlates with an INR of 2-3 for patients receiving Warfarin.   Chromogenic Factor 10 levels below 20% indicate an INR greater than 3 and   levels above 40% indicate an INR less than 2.         ASSESSMENT / PLAN  INR assessment SUPRA    Recheck INR In: 1 WEEK    INR Location Outside lab      Anticoagulation Summary  As of 9/11/2019    INR goal:   2.0-3.0   TTR:   76.1 % (7.6 mo)   INR used for dosing:   3.7! (9/11/2019)   Warfarin maintenance plan:   5 mg (5 mg x 1) every day   Full warfarin instructions:   9/11: 2.5 mg; Otherwise 5 mg every day   Weekly warfarin total:   35 mg   Plan last modified:   Tom Soto, [facility] (9/11/2019)   Next INR check:   9/18/2019   Priority:   INR   Target end date:   Indefinite    Indications    Heart failure (H) [I50.9]  LVAD (left ventricular assist device) present (H) [Z95.811]             Anticoagulation Episode Summary     INR check location:       Preferred lab:       Send INR reminders to:   [facility]    Comments:   LVAD implanted 12/31/18  3  ASA 81mg Daily Has 2.5mg tablets   [place] lab cnl=[phone].  Faxed S.O there on 2/25/2019 7/9/19 Follow INR's only no Chromogenic X's      Anticoagulation Care Providers     Provider Role Specialty Phone number    Jenni Ortiz MD Responsible Cardiology [phone]            See the Encounter Report to view Anticoagulation Flowsheet and Dosing Calendar (Go to Encounters tab in chart  review, and find the Anticoagulation Therapy Visit)    Spoke with patient. Gave them their lab results and new warfarin recommendation.  No changes in health, or diet. No missed doses, no falls. No signs or symptoms of bleed or clotting.    Patient had LVAD placed on:  12/31/18  Type of LVAD: Heartmate 3  Patient's current Aspirin dose:  81mgs daily    Tom Soto [facility]

## 2019-09-13 ENCOUNTER — CARE COORDINATION (OUTPATIENT)
Dept: CARDIOLOGY | Facility: CLINIC | Age: 59
End: 2019-09-13

## 2019-09-13 DIAGNOSIS — I50.21 ACUTE SYSTOLIC HEART FAILURE (H): ICD-10-CM

## 2019-09-13 RX ORDER — DIGOXIN 125 MCG
125 TABLET ORAL
Qty: 90 TABLET | Refills: 3 | Status: ON HOLD | COMMUNITY
Start: 2019-09-14 | End: 2020-05-29

## 2019-09-13 NOTE — PROGRESS NOTES
Pt had labs drawn on 9/11 as requested to follow-up on med changes. Creatinine improved from prior lab draw. Digoxin level is slightly elevated, and per Pippa Rodriguez, instructed pt to hold for two days, then go down to 125 mcg four times a week. Pt will repeat dig level in two weeks. Instructed pt not to take his dig until after the blood draw the am of the test. Pt verbalized understanding. Lab order sent to home lab.

## 2019-09-13 NOTE — LETTER
Patient Name: Mariusz Sharp   : 1960   Diagnosis/ICD-10: Heart Failure, unspecified I50.9; LVAD 295.811   Requesting Physician: Dr. Jenni Ortiz   Date of Request: 19   Date to Draw 19   /  **Please fax results to Marcy Shahid RN VAD Coord at [phone].                Call [phone] with Questions      ORDER CODE TESTS YOHANA.VOL.    CBASIC Na, K, Cl, CO2, Crea, BUN, Glu, Ca GG 0.5-1    BHEPAT Alb, AlkP, ALT, AST, BBil, TP GG 0.5-1    BLIP Chol, Trig, HDL, LDL GG 1-2    CCOMP Na, K, Cl, CO2, Crea, BUN, Glu, Ca, Alb, AlkP, ALT, AST, Bbil, TP,  GG 0.6-1    HGP CBC & Platelet P 0.3-1    HGDP CBC, Differential & Platelet P 0.3-1    PLT Platelet  P 0.3-1    INR INR B 2.7    PTT PTT B 2.7    LD Lactate Dehydrogenase GG 0.3-1    PHGB Plasma Hemoglobin GG 2-3    Pre-Albumin Pre-Albumin RG 1.0   X Dig Digoxin Level             Signed,      Jenni Ortiz MD  Heart Failure, Mechanical Circulatory Support and Transplant Cardiology   of Medicine,  Division of Cardiology, [facility]

## 2019-09-16 ENCOUNTER — HOSPITAL ENCOUNTER (OUTPATIENT)
Dept: RESPIRATORY THERAPY | Facility: CLINIC | Age: 59
End: 2019-09-16
Attending: FAMILY MEDICINE
Payer: COMMERCIAL

## 2019-09-18 ENCOUNTER — ANTICOAGULATION THERAPY VISIT (OUTPATIENT)
Dept: ANTICOAGULATION | Facility: CLINIC | Age: 59
End: 2019-09-18

## 2019-09-18 DIAGNOSIS — I50.9 HEART FAILURE (H): ICD-10-CM

## 2019-09-18 DIAGNOSIS — Z95.811 LVAD (LEFT VENTRICULAR ASSIST DEVICE) PRESENT (H): ICD-10-CM

## 2019-09-18 LAB — INR PPP: 2

## 2019-09-18 NOTE — PROGRESS NOTES
ANTICOAGULATION FOLLOW-UP CLINIC VISIT    Patient Name:  Mariusz Sharp  Date:  2019  Contact Type:  Telephone    SUBJECTIVE:         OBJECTIVE    INR   Date Value Ref Range Status   2019 2.0  Final     Chromogenic Factor 10   Date Value Ref Range Status   2019 72 70 - 130 % Final     Comment:     Therapeutic Range:  A Chromogenic Factor 10 level of approximately 20-40%   inversely correlates with an INR of 2-3 for patients receiving Warfarin.   Chromogenic Factor 10 levels below 20% indicate an INR greater than 3 and   levels above 40% indicate an INR less than 2.         ASSESSMENT / PLAN  INR assessment THER    Recheck INR In: 1 WEEK    INR Location Outside lab      Anticoagulation Summary  As of 2019    INR goal:   2.0-3.0   TTR:   75.6 % (7.8 mo)   INR used for dosin.0 (2019)   Warfarin maintenance plan:   7.5 mg (5 mg x 1.5) every Wed; 5 mg (5 mg x 1) all other days   Full warfarin instructions:   7.5 mg every Wed; 5 mg all other days   Weekly warfarin total:   37.5 mg   Plan last modified:   aNsreen Kathleen RN (2019)   Next INR check:   2019   Priority:   INR   Target end date:   Indefinite    Indications    Heart failure (H) [I50.9]  LVAD (left ventricular assist device) present (H) [Z95.811]             Anticoagulation Episode Summary     INR check location:       Preferred lab:       Send INR reminders to:   [facility]    Comments:   LVAD implanted 18  3  ASA 81mg Daily Has 2.5mg tablets   [place] lab lyp=[phone].  Faxed S.O there on 2019 Follow INR's only no Chromogenic X's      Anticoagulation Care Providers     Provider Role Specialty Phone number    Jenni Ortiz MD Responsible Cardiology [phone]            See the Encounter Report to view Anticoagulation Flowsheet and Dosing Calendar (Go to Encounters tab in chart review, and find the Anticoagulation Therapy Visit)  Spoke with patient.   Patient had LVAD  placed on:   12/31/2018  Type of LVAD: HM3  Patient's current Aspirin dose: 81mg  LVAD Protocol followed: yes  Nasreen Kathleen RN

## 2019-09-25 ENCOUNTER — ANTICOAGULATION THERAPY VISIT (OUTPATIENT)
Dept: ANTICOAGULATION | Facility: CLINIC | Age: 59
End: 2019-09-25

## 2019-09-25 DIAGNOSIS — I50.9 HEART FAILURE (H): ICD-10-CM

## 2019-09-25 DIAGNOSIS — Z95.811 LVAD (LEFT VENTRICULAR ASSIST DEVICE) PRESENT (H): ICD-10-CM

## 2019-09-25 LAB
DIGOXIN SERPL-MCNC: 0.8 UG/L
INR PPP: 2.2

## 2019-09-25 NOTE — PROGRESS NOTES
ANTICOAGULATION FOLLOW-UP CLINIC VISIT    Patient Name:  Mariusz Sharp  Date:  2019  Contact Type:  Telephone    SUBJECTIVE:  Patient Findings         Clinical Outcomes     Negatives:   Major bleeding event, Thromboembolic event, Anticoagulation-related hospital admission, Anticoagulation-related ED visit, Anticoagulation-related fatality           OBJECTIVE    INR   Date Value Ref Range Status   2019 2.20  Final     Comment:     Outside Lab      Chromogenic Factor 10   Date Value Ref Range Status   2019 72 70 - 130 % Final     Comment:     Therapeutic Range:  A Chromogenic Factor 10 level of approximately 20-40%   inversely correlates with an INR of 2-3 for patients receiving Warfarin.   Chromogenic Factor 10 levels below 20% indicate an INR greater than 3 and   levels above 40% indicate an INR less than 2.         ASSESSMENT / PLAN  INR assessment THER    Recheck INR In: 1 WEEK    INR Location Outside lab      Anticoagulation Summary  As of 2019    INR goal:   2.0-3.0   TTR:   76.3 % (8.1 mo)   INR used for dosin.20 (2019)   Warfarin maintenance plan:   7.5 mg (5 mg x 1.5) every Wed; 5 mg (5 mg x 1) all other days   Full warfarin instructions:   7.5 mg every Wed; 5 mg all other days   Weekly warfarin total:   37.5 mg   Plan last modified:   Nasreen Kathleen RN (2019)   Next INR check:   10/2/2019   Priority:   INR   Target end date:   Indefinite    Indications    Heart failure (H) [I50.9]  LVAD (left ventricular assist device) present (H) [Z95.811]             Anticoagulation Episode Summary     INR check location:       Preferred lab:       Send INR reminders to:    [facility]    Comments:   LVAD implanted 18  3  ASA 81mg Daily Has 2.5mg tablets   [place] lab mtl=[phone].  Faxed S.O there on 2019 Follow INR's only no Chromogenic X's      Anticoagulation Care Providers     Provider Role Specialty Phone number    Jenni Ortiz MD  [facility] Cardiology [phone]            See the Encounter Report to view Anticoagulation Flowsheet and Dosing Calendar (Go to Encounters tab in chart review, and find the Anticoagulation Therapy Visit)    Spoke with patient. Gave them their lab results and new warfarin recommendation.  No changes in health, medication, or diet. No missed doses, no falls. No signs or symptoms of bleed or clotting.     Patient had LVAD placed on:   12/31/18  Type of LVAD: HM3  Patient's current Aspirin dose: ASA 81mg Daily  LVAD Protocol followed: Yes   If Not Followed Explanation:  N/A    Chasity Gonzalez RN

## 2019-10-02 ENCOUNTER — ANTICOAGULATION THERAPY VISIT (OUTPATIENT)
Dept: ANTICOAGULATION | Facility: CLINIC | Age: 59
End: 2019-10-02

## 2019-10-02 DIAGNOSIS — Z95.811 LVAD (LEFT VENTRICULAR ASSIST DEVICE) PRESENT (H): ICD-10-CM

## 2019-10-02 DIAGNOSIS — I50.9 HEART FAILURE (H): ICD-10-CM

## 2019-10-02 LAB — INR PPP: 2.7

## 2019-10-02 NOTE — PROGRESS NOTES
ANTICOAGULATION FOLLOW-UP CLINIC VISIT    Patient Name:  Mariusz Sharp  Date:  10/2/2019  Contact Type:  Telephone    SUBJECTIVE:  Patient Findings     Comments:   The INR result is called in by staff from [facility]         Clinical Outcomes     Comments:   The INR result is called in by staff from [facility]            OBJECTIVE    INR   Date Value Ref Range Status   10/02/2019 2.7  Final     Chromogenic Factor 10   Date Value Ref Range Status   2019 72 70 - 130 % Final     Comment:     Therapeutic Range:  A Chromogenic Factor 10 level of approximately 20-40%   inversely correlates with an INR of 2-3 for patients receiving Warfarin.   Chromogenic Factor 10 levels below 20% indicate an INR greater than 3 and   levels above 40% indicate an INR less than 2.         ASSESSMENT / PLAN  INR assessment THER    Recheck INR In: 1 WEEK    INR Location Outside lab      Anticoagulation Summary  As of 10/2/2019    INR goal:   2.0-3.0   TTR:   76.9 % (8.3 mo)   INR used for dosin.7 (10/2/2019)   Warfarin maintenance plan:   7.5 mg (5 mg x 1.5) every Wed; 5 mg (5 mg x 1) all other days   Full warfarin instructions:   7.5 mg every Wed; 5 mg all other days   Weekly warfarin total:   37.5 mg   No change documented:   Brenda Alford RN   Plan last modified:   Nasreen Kathleen RN (2019)   Next INR check:   10/9/2019   Priority:   INR   Target end date:   Indefinite    Indications    Heart failure (H) [I50.9]  LVAD (left ventricular assist device) present (H) [Z95.811]             Anticoagulation Episode Summary     INR check location:       Preferred lab:       Send INR reminders to:   [facility]    Comments:   LVAD implanted 18  3  ASA 81mg Daily Has 2.5mg tablets   [place] lab eyf=[phone].  Faxed S.O there on 2019 Follow INR's only no Chromogenic X's      Anticoagulation Care Providers     Provider Role Specialty Phone number    Jenni Ortiz MD Responsible  Cardiology [phone]            See the Encounter Report to view Anticoagulation Flowsheet and Dosing Calendar (Go to Encounters tab in chart review, and find the Anticoagulation Therapy Visit)    Spoke with patient. Gave them their lab results and new warfarin recommendation.  No changes in health, medication, or diet. No missed doses, no falls. No signs or symptoms of bleed or clotting.      Brenad Alford RN

## 2019-10-09 ENCOUNTER — ANTICOAGULATION THERAPY VISIT (OUTPATIENT)
Dept: ANTICOAGULATION | Facility: CLINIC | Age: 59
End: 2019-10-09

## 2019-10-09 DIAGNOSIS — Z95.811 LVAD (LEFT VENTRICULAR ASSIST DEVICE) PRESENT (H): ICD-10-CM

## 2019-10-09 DIAGNOSIS — I50.9 HEART FAILURE (H): ICD-10-CM

## 2019-10-09 LAB — INR PPP: 1.9

## 2019-10-09 NOTE — PROGRESS NOTES
ANTICOAGULATION FOLLOW-UP CLINIC VISIT    Patient Name:  Mariusz Sharp  Date:  10/9/2019  Contact Type:  Telephone    SUBJECTIVE:  Patient Findings     Positives:   Missed doses    Comments:   Missed dose on 10/7        Clinical Outcomes     Comments:   Missed dose on 10/7           OBJECTIVE    INR   Date Value Ref Range Status   10/09/2019 1.9  Final     Chromogenic Factor 10   Date Value Ref Range Status   2019 72 70 - 130 % Final     Comment:     Therapeutic Range:  A Chromogenic Factor 10 level of approximately 20-40%   inversely correlates with an INR of 2-3 for patients receiving Warfarin.   Chromogenic Factor 10 levels below 20% indicate an INR greater than 3 and   levels above 40% indicate an INR less than 2.         ASSESSMENT / PLAN  INR assessment SUB    Recheck INR In: 2 DAYS    INR Location Outside lab      Anticoagulation Summary  As of 10/9/2019    INR goal:   2.0-3.0   TTR:   77.2 % (8.5 mo)   INR used for dosin.9! (10/9/2019)   Warfarin maintenance plan:   7.5 mg (5 mg x 1.5) every Wed; 5 mg (5 mg x 1) all other days   Full warfarin instructions:   10/9: 8.75 mg; 10/10: 6.25 mg; Otherwise 7.5 mg every Wed; 5 mg all other days   Weekly warfarin total:   37.5 mg   Plan last modified:   Nasreen Kathleen RN (2019)   Next INR check:   10/11/2019   Priority:   INR   Target end date:   Indefinite    Indications    Heart failure (H) [I50.9]  LVAD (left ventricular assist device) present (H) [Z95.811]             Anticoagulation Episode Summary     INR check location:       Preferred lab:       Send INR reminders to:   [facility]    Comments:   LVAD implanted 18  3  ASA 81mg Daily Has 2.5mg tablets   [place] lab qty=[phone].  Faxed S.O there on 2019 Follow INR's only no Chromogenic X's      Anticoagulation Care Providers     Provider Role Specialty Phone number    Jenni Ortiz MD Responsible Cardiology [phone]            See the Encounter  Report to view Anticoagulation Flowsheet and Dosing Calendar (Go to Encounters tab in chart review, and find the Anticoagulation Therapy Visit)    See above notation  No change in the dose of ASA per LVAD protocol.    Brenda Alford RN

## 2019-10-11 ENCOUNTER — ANTICOAGULATION THERAPY VISIT (OUTPATIENT)
Dept: ANTICOAGULATION | Facility: CLINIC | Age: 59
End: 2019-10-11

## 2019-10-11 DIAGNOSIS — I50.9 HEART FAILURE (H): ICD-10-CM

## 2019-10-11 DIAGNOSIS — Z95.811 LVAD (LEFT VENTRICULAR ASSIST DEVICE) PRESENT (H): ICD-10-CM

## 2019-10-11 LAB — INR PPP: 2

## 2019-10-11 NOTE — PROGRESS NOTES
ANTICOAGULATION FOLLOW-UP CLINIC VISIT    Patient Name:  Mariusz Sharp  Date:  10/11/2019  Contact Type:  Telephone    SUBJECTIVE:  Patient Findings         Clinical Outcomes     Negatives:   Major bleeding event, Thromboembolic event, Anticoagulation-related hospital admission, Anticoagulation-related ED visit, Anticoagulation-related fatality           OBJECTIVE    INR   Date Value Ref Range Status   10/11/2019 2.00  Final     Comment:     Outside Lab      Chromogenic Factor 10   Date Value Ref Range Status   2019 72 70 - 130 % Final     Comment:     Therapeutic Range:  A Chromogenic Factor 10 level of approximately 20-40%   inversely correlates with an INR of 2-3 for patients receiving Warfarin.   Chromogenic Factor 10 levels below 20% indicate an INR greater than 3 and   levels above 40% indicate an INR less than 2.         ASSESSMENT / PLAN  INR assessment THER    Recheck INR In: 1 WEEK    INR Location Clinic      Anticoagulation Summary  As of 10/11/2019    INR goal:   2.0-3.0   TTR:   76.6 % (8.6 mo)   INR used for dosin.00 (10/11/2019)   Warfarin maintenance plan:   7.5 mg (2.5 mg x 3) every Wed; 5 mg (2.5 mg x 2) all other days   Full warfarin instructions:   7.5 mg every Wed; 5 mg all other days   Weekly warfarin total:   37.5 mg   Plan last modified:   Chasity Gonzalez RN (10/11/2019)   Next INR check:   10/18/2019   Priority:   INR   Target end date:   Indefinite    Indications    Heart failure (H) [I50.9]  LVAD (left ventricular assist device) present (H) [Z95.811]             Anticoagulation Episode Summary     INR check location:       Preferred lab:       Send INR reminders to:   [facility]    Comments:   LVAD implanted 18  3  ASA 81mg Daily Has 2.5mg tablets   [place] lab yzz=[phone].  Faxed S.O there on 2019 Follow INR's only no Chromogenic X's      Anticoagulation Care Providers     Provider Role Specialty Phone number    Jenni Ortiz MD  [facility] Cardiology [phone]            See the Encounter Report to view Anticoagulation Flowsheet and Dosing Calendar (Go to Encounters tab in chart review, and find the Anticoagulation Therapy Visit)    Spoke with patient. Gave them their lab results and new warfarin recommendation.  No changes in health, medication, or diet. No missed doses, no falls. No signs or symptoms of bleed or clotting.     Patient had LVAD placed on:   12/31/18  Type of LVAD: HM3  Patient's current Aspirin dose: ASA 81mg Daily  LVAD Protocol followed: Yes   If Not Followed Explanation:  N/A    Chasity Gonzalez RN

## 2019-10-18 ENCOUNTER — ANTICOAGULATION THERAPY VISIT (OUTPATIENT)
Dept: ANTICOAGULATION | Facility: CLINIC | Age: 59
End: 2019-10-18

## 2019-10-18 DIAGNOSIS — Z95.811 LVAD (LEFT VENTRICULAR ASSIST DEVICE) PRESENT (H): ICD-10-CM

## 2019-10-18 DIAGNOSIS — I50.9 HEART FAILURE (H): ICD-10-CM

## 2019-10-18 LAB — INR PPP: 2.5

## 2019-10-18 NOTE — PROGRESS NOTES
ANTICOAGULATION FOLLOW-UP CLINIC VISIT    Patient Name:  Mariusz Sharp  Date:  10/18/2019  Contact Type:  Telephone    SUBJECTIVE:         OBJECTIVE    INR   Date Value Ref Range Status   10/18/2019 2.5  Final     Chromogenic Factor 10   Date Value Ref Range Status   2019 72 70 - 130 % Final     Comment:     Therapeutic Range:  A Chromogenic Factor 10 level of approximately 20-40%   inversely correlates with an INR of 2-3 for patients receiving Warfarin.   Chromogenic Factor 10 levels below 20% indicate an INR greater than 3 and   levels above 40% indicate an INR less than 2.         ASSESSMENT / PLAN  INR assessment THER    Recheck INR In: 1 WEEK    INR Location Outside lab      Anticoagulation Summary  As of 10/18/2019    INR goal:   2.0-3.0   TTR:   77.2 % (8.8 mo)   INR used for dosin.5 (10/18/2019)   Warfarin maintenance plan:   7.5 mg (2.5 mg x 3) every Wed; 5 mg (2.5 mg x 2) all other days   Full warfarin instructions:   7.5 mg every Wed; 5 mg all other days   Weekly warfarin total:   37.5 mg   No change documented:   Brenda Alford RN   Plan last modified:   Chasity Gonzalez RN (10/11/2019)   Next INR check:   10/25/2019   Priority:   INR   Target end date:   Indefinite    Indications    Heart failure (H) [I50.9]  LVAD (left ventricular assist device) present (H) [Z95.811]             Anticoagulation Episode Summary     INR check location:       Preferred lab:       Send INR reminders to:   [facility]    Comments:   LVAD implanted 18  3  ASA 81mg Daily Has 2.5mg tablets   [place] lab awp=[phone].  Faxed S.O there on 2019 Follow INR's only no Chromogenic X's      Anticoagulation Care Providers     Provider Role Specialty Phone number    Jenni Ortiz MD [facility] Cardiology [phone]            See the Encounter Report to view Anticoagulation Flowsheet and Dosing Calendar (Go to Encounters tab in chart review, and find the Anticoagulation Therapy  Visit)    Spoke with patient. Gave them their lab results and new warfarin recommendation.  No changes in health, medication, or diet. No missed doses, no falls. No signs or symptoms of bleed or clotting.      Brenda Alford RN

## 2019-10-25 ENCOUNTER — ANTICOAGULATION THERAPY VISIT (OUTPATIENT)
Dept: ANTICOAGULATION | Facility: CLINIC | Age: 59
End: 2019-10-25

## 2019-10-25 DIAGNOSIS — I50.9 HEART FAILURE (H): ICD-10-CM

## 2019-10-25 DIAGNOSIS — Z95.811 LVAD (LEFT VENTRICULAR ASSIST DEVICE) PRESENT (H): ICD-10-CM

## 2019-10-25 LAB — INR PPP: 3.4

## 2019-10-25 NOTE — PROGRESS NOTES
ANTICOAGULATION FOLLOW-UP CLINIC VISIT    Patient Name:  Mariusz Sharp  Date:  10/25/2019  Contact Type:  Telephone    SUBJECTIVE:  Patient Findings     Comments:   Staff from [facility] call with the INR result today.  No apparent reason for the elevated INR today         Clinical Outcomes     Comments:   Staff from [facility] call with the INR result today.  No apparent reason for the elevated INR today            OBJECTIVE    INR   Date Value Ref Range Status   10/25/2019 3.4  Final     Chromogenic Factor 10   Date Value Ref Range Status   01/11/2019 72 70 - 130 % Final     Comment:     Therapeutic Range:  A Chromogenic Factor 10 level of approximately 20-40%   inversely correlates with an INR of 2-3 for patients receiving Warfarin.   Chromogenic Factor 10 levels below 20% indicate an INR greater than 3 and   levels above 40% indicate an INR less than 2.         ASSESSMENT / PLAN  INR assessment SUPRA    Recheck INR In: 1 WEEK    INR Location Outside lab      Anticoagulation Summary  As of 10/25/2019    INR goal:   2.0-3.0   TTR:   76.7 % (9.1 mo)   INR used for dosing:   3.4! (10/25/2019)   Warfarin maintenance plan:   7.5 mg (2.5 mg x 3) every Wed; 5 mg (2.5 mg x 2) all other days   Full warfarin instructions:   7.5 mg every Wed; 5 mg all other days   Weekly warfarin total:   37.5 mg   No change documented:   Brenda Alford RN   Plan last modified:   Chasity Gonzalez RN (10/11/2019)   Next INR check:   11/1/2019   Priority:   INR   Target end date:   Indefinite    Indications    Heart failure (H) [I50.9]  LVAD (left ventricular assist device) present (H) [Z95.811]             Anticoagulation Episode Summary     INR check location:       Preferred lab:       Send INR reminders to:   [facility]    Comments:   LVAD implanted 12/31/18  3  ASA 81mg Daily Has 2.5mg tablets   [place] lab acf=[phone].  Faxed S.O there on 2/25/2019 7/9/19 Follow INR's only no Chromogenic X's      Anticoagulation Care Providers      Provider Role Specialty Phone number    Jenni Ortiz MD Responsible Cardiology [phone]            See the Encounter Report to view Anticoagulation Flowsheet and Dosing Calendar (Go to Encounters tab in chart review, and find the Anticoagulation Therapy Visit)    Spoke with patient. Gave them their lab results and new warfarin recommendation.  No changes in health, medication, or diet. No missed doses, no falls. No signs or symptoms of bleed or clotting.      Brenda Alford RN

## 2019-11-01 ENCOUNTER — ANTICOAGULATION THERAPY VISIT (OUTPATIENT)
Dept: ANTICOAGULATION | Facility: CLINIC | Age: 59
End: 2019-11-01

## 2019-11-01 DIAGNOSIS — I50.9 HEART FAILURE (H): ICD-10-CM

## 2019-11-01 DIAGNOSIS — Z95.811 LVAD (LEFT VENTRICULAR ASSIST DEVICE) PRESENT (H): ICD-10-CM

## 2019-11-01 LAB — INR PPP: 3

## 2019-11-01 NOTE — PROGRESS NOTES
ANTICOAGULATION FOLLOW-UP CLINIC VISIT    Patient Name:  Mariusz Sharp  Date:  11/1/2019  Contact Type:  Telephone    SUBJECTIVE:  Patient Findings     Comments:   Patient reports taking digoxin 4 times a week. Patient reports he was instructed last week to take 2.5mg on 10/25.  Calender is updated.         Clinical Outcomes     Comments:   Patient reports taking digoxin 4 times a week. Patient reports he was instructed last week to take 2.5mg on 10/25.  Calender is updated.            OBJECTIVE    INR   Date Value Ref Range Status   11/01/2019 3.0  Final     Chromogenic Factor 10   Date Value Ref Range Status   01/11/2019 72 70 - 130 % Final     Comment:     Therapeutic Range:  A Chromogenic Factor 10 level of approximately 20-40%   inversely correlates with an INR of 2-3 for patients receiving Warfarin.   Chromogenic Factor 10 levels below 20% indicate an INR greater than 3 and   levels above 40% indicate an INR less than 2.         ASSESSMENT / PLAN  No question data found.  Anticoagulation Summary  As of 11/1/2019    INR goal:   2.0-3.0   TTR:   74.8 % (9.3 mo)   INR used for dosing:   3.0 (11/1/2019)   Warfarin maintenance plan:   5 mg (2.5 mg x 2) every day   Full warfarin instructions:   5 mg every day   Weekly warfarin total:   35 mg   Plan last modified:   Sophy Griggs RN (11/1/2019)   Next INR check:   11/8/2019   Priority:   INR   Target end date:   Indefinite    Indications    Heart failure (H) [I50.9]  LVAD (left ventricular assist device) present (H) [Z95.811]             Anticoagulation Episode Summary     INR check location:       Preferred lab:       Send INR reminders to:   [facility]    Comments:   LVAD implanted 12/31/18  3  ASA 81mg Daily Has 2.5mg tablets   [place] lab xsc=[phone].  Faxed S.O there on 2/25/2019 7/9/19 Follow INR's only no Chromogenic X's      Anticoagulation Care Providers     Provider Role Specialty Phone number    Jenni Ortiz MD Responsible  Cardiology [phone]            See the Encounter Report to view Anticoagulation Flowsheet and Dosing Calendar (Go to Encounters tab in chart review, and find the Anticoagulation Therapy Visit)    Spoke with patient.     Sophy Griggs RN

## 2019-11-04 ENCOUNTER — DOCUMENTATION ONLY (OUTPATIENT)
Dept: TRANSPLANT | Facility: CLINIC | Age: 59
End: 2019-11-04

## 2019-11-04 DIAGNOSIS — I50.20 SYSTOLIC HEART FAILURE (H): Primary | ICD-10-CM

## 2019-11-04 NOTE — Clinical Note
Please schedule social work on 12-4-19 and palliative in March (unless you are able to get in on 12-4-19)

## 2019-11-05 NOTE — PROGRESS NOTES
Status 4 Heart Extension Complete 11/4/19    This patient meets status 4 criteria as evidenced by:     Dischargeable LVAD without discretionary 30 days     Patient's primary cardiologist and/or attending physician is in agreement with this plan.      Status 4 Extension due 2/3/19

## 2019-11-07 ENCOUNTER — ANTICOAGULATION THERAPY VISIT (OUTPATIENT)
Dept: ANTICOAGULATION | Facility: CLINIC | Age: 59
End: 2019-11-07

## 2019-11-07 DIAGNOSIS — I50.9 HEART FAILURE (H): ICD-10-CM

## 2019-11-07 DIAGNOSIS — Z95.811 LVAD (LEFT VENTRICULAR ASSIST DEVICE) PRESENT (H): ICD-10-CM

## 2019-11-07 DIAGNOSIS — Z94.1 HEART REPLACED BY TRANSPLANT (H): ICD-10-CM

## 2019-11-07 NOTE — PROGRESS NOTES
Addendum 11/13/19  Message is left reminding patient he was due for an INR check today. [facility]              ANTICOAGULATION FOLLOW-UP CLINIC VISIT    Patient Name:  Mariusz Sharp  Date:  11/7/2019  Contact Type:  Telephone    SUBJECTIVE:  Patient Findings     Comments:   Verified the pt took coumadin as directed - pt reports walking 4-6 miles/day - no apparent reason why the INR would be rising despite the smaller dose of warfarin.         Clinical Outcomes     Comments:   Verified the pt took coumadin as directed - pt reports walking 4-6 miles/day - no apparent reason why the INR would be rising despite the smaller dose of warfarin.            OBJECTIVE    INR   Date Value Ref Range Status   11/01/2019 3.0  Final     Chromogenic Factor 10   Date Value Ref Range Status   01/11/2019 72 70 - 130 % Final     Comment:     Therapeutic Range:  A Chromogenic Factor 10 level of approximately 20-40%   inversely correlates with an INR of 2-3 for patients receiving Warfarin.   Chromogenic Factor 10 levels below 20% indicate an INR greater than 3 and   levels above 40% indicate an INR less than 2.         ASSESSMENT / PLAN  INR assessment SUPRA    Recheck INR In: 6 DAYS    INR Location Outside lab      Anticoagulation Summary  As of 11/7/2019    INR goal:   2.0-3.0   TTR:   74.8 % (9.3 mo)   INR used for dosing:      Warfarin maintenance plan:   5 mg (2.5 mg x 2) every day   Full warfarin instructions:   11/7: 2.5 mg; Otherwise 5 mg every day   Weekly warfarin total:   35 mg   Plan last modified:   Sophy Griggs RN (11/1/2019)   Next INR check:   11/13/2019   Priority:   INR   Target end date:   Indefinite    Indications    Heart failure (H) [I50.9]  LVAD (left ventricular assist device) present (H) [Z95.811]             Anticoagulation Episode Summary     INR check location:       Preferred lab:       Send INR reminders to:   [facility]    Comments:   LVAD implanted 12/31/18 HM 3  ASA 81mg Daily Has 2.5mg tablets    [place] lab ypr=[phone].  Faxed S.O there on 2/25/2019 7/9/19 Follow INR's only no Chromogenic X's      Anticoagulation Care Providers     Provider Role Specialty Phone number    Jenni Ortiz MD Responsible Cardiology [phone]            See the Encounter Report to view Anticoagulation Flowsheet and Dosing Calendar (Go to Encounters tab in chart review, and find the Anticoagulation Therapy Visit)    Spoke with patient. Gave them their lab results and new warfarin recommendation.  No changes in health, medication, or diet. No missed doses, no falls. No signs or symptoms of bleed or clotting.      Brenda Alford RN

## 2019-11-13 ENCOUNTER — ANTICOAGULATION THERAPY VISIT (OUTPATIENT)
Dept: ANTICOAGULATION | Facility: CLINIC | Age: 59
End: 2019-11-13

## 2019-11-13 DIAGNOSIS — I50.9 HEART FAILURE (H): ICD-10-CM

## 2019-11-13 DIAGNOSIS — Z95.811 LVAD (LEFT VENTRICULAR ASSIST DEVICE) PRESENT (H): ICD-10-CM

## 2019-11-13 LAB — INR PPP: 2.7

## 2019-11-13 NOTE — PROGRESS NOTES
ANTICOAGULATION FOLLOW-UP CLINIC VISIT    Patient Name:  Mariusz Sharp  Date:  2019  Contact Type:  Telephone    SUBJECTIVE:         OBJECTIVE    INR   Date Value Ref Range Status   2019 2.7  Final     Chromogenic Factor 10   Date Value Ref Range Status   2019 72 70 - 130 % Final     Comment:     Therapeutic Range:  A Chromogenic Factor 10 level of approximately 20-40%   inversely correlates with an INR of 2-3 for patients receiving Warfarin.   Chromogenic Factor 10 levels below 20% indicate an INR greater than 3 and   levels above 40% indicate an INR less than 2.         ASSESSMENT / PLAN  INR assessment THER    Recheck INR In: 1 WEEK    INR Location Outside lab      Anticoagulation Summary  As of 2019    INR goal:   2.0-3.0   TTR:   75.8 % (9.7 mo)   INR used for dosin.7 (2019)   Warfarin maintenance plan:   5 mg (2.5 mg x 2) every day   Full warfarin instructions:   5 mg every day   Weekly warfarin total:   35 mg   No change documented:   Sandor Sanchez RN   Plan last modified:   Sophy Griggs RN (2019)   Next INR check:   2019   Priority:   INR   Target end date:   Indefinite    Indications    Heart failure (H) [I50.9]  LVAD (left ventricular assist device) present (H) [Z95.811]             Anticoagulation Episode Summary     INR check location:       Preferred lab:       Send INR reminders to:   [facility]    Comments:   LVAD implanted 18 HM 3  ASA 81mg Daily Has 2.5mg tablets   [place] lab icp=[phone].  Faxed S.O there on 2019 Follow INR's only no Chromogenic X's      Anticoagulation Care Providers     Provider Role Specialty Phone number    Jenni Ortiz MD Responsible Cardiology [phone]            See the Encounter Report to view Anticoagulation Flowsheet and Dosing Calendar (Go to Encounters tab in chart review, and find the Anticoagulation Therapy Visit)    INR/CFX/F2 RESULT: INR result is  2.7    ASSESSMENT:No Problems found. No Changes in Health, Medications, or diet. No Signs of bruising, bleeding or clotting.    DOSING ADJUSTMENT:continue current maintenance dose    NEXT INR/FACTOR X OR FACTOR II: goal 2-3    PROTOCOL FOLLOWED:LVAD  HM 3 placed 12/31/18  ASA 81mg    Sandor Sanchez RN

## 2019-11-19 ENCOUNTER — TELEPHONE (OUTPATIENT)
Dept: TRANSPLANT | Facility: CLINIC | Age: 59
End: 2019-11-19

## 2019-11-19 NOTE — TELEPHONE ENCOUNTER
Spoke with the patient and confirmed Heart Transplant Waitlist appointments on 12/4/19 and 3/4/20.  Informed patient an itinerary can be accessed via [employer], and will be sent via [employer]

## 2019-11-19 NOTE — LETTER
PRE HEART TRANSPLANT WAITLIST APPOINTMENTS    Patient: Mariusz Sharp  MR#: [id number]  Coordinator: Patience HAHN [phone]  : Marjan [phone]  Dates: December 4, 2019 & March 4, 2020        Day/Date:  Wednesday, December 4, 2019  Time Location Activity   12:30 PM [facility]  [address]; [place] [address]  Imaging and Lab Testing  1st floor Blood tests     1:00 PM [facility] [place]  Heart Care Clinic  3rd floor; Clinic 3L; Room 3.81- Located in Cardiovascular Clinic Appointment with Nida Rainey,  Transplant    1:30 PM [facility]  Pulmonary Function Lab  3rd floor Six-Minute Walk   2:00 PM [facility]  Pulmonary Function Lab  3rd floor Pulmonary Function Test  (Please do not wear any perfume, deodorant or scented products)   3:00 PM [facility] [place]  Heart Care Clinic  3rd floor; Clinic 3L Cardiac Device Check- In Clinic   3:30 PM [facility]  Heart Care Clinic  3rd floor; Clinic 3L Appointment with Dr. Ortiz,  Cardiologist                           Day/Date:  Wednesday, March 4, 2019  Time Location Activity   1:00 PM [facility] [place]  Heart Care Clinic  3rd floor; Clinic 3L Appointment with Pippa Munroe,  Cardiology   2:45 PM [facility] [place]  Palliative Care Clinic  [facility]  2nd floor; Clinic 2C Appointment with Dr. Adams, Palliative Care

## 2019-11-20 ENCOUNTER — ANTICOAGULATION THERAPY VISIT (OUTPATIENT)
Dept: ANTICOAGULATION | Facility: CLINIC | Age: 59
End: 2019-11-20

## 2019-11-20 DIAGNOSIS — Z95.811 LVAD (LEFT VENTRICULAR ASSIST DEVICE) PRESENT (H): ICD-10-CM

## 2019-11-20 DIAGNOSIS — I50.9 HEART FAILURE (H): ICD-10-CM

## 2019-11-20 LAB — INR PPP: 2.8

## 2019-11-20 NOTE — PROGRESS NOTES
ANTICOAGULATION FOLLOW-UP CLINIC VISIT    Patient Name:  Mariusz Sharp  Date:  2019  Contact Type:  Telephone    SUBJECTIVE:  Patient Findings     Positives:   Missed doses (Missed coumadin 5mg on )             OBJECTIVE    INR   Date Value Ref Range Status   2019 2.8  Final     Chromogenic Factor 10   Date Value Ref Range Status   2019 72 70 - 130 % Final     Comment:     Therapeutic Range:  A Chromogenic Factor 10 level of approximately 20-40%   inversely correlates with an INR of 2-3 for patients receiving Warfarin.   Chromogenic Factor 10 levels below 20% indicate an INR greater than 3 and   levels above 40% indicate an INR less than 2.         ASSESSMENT / PLAN  INR assessment THER    Recheck INR In: 1 WEEK    INR Location Outside lab      Anticoagulation Summary  As of 2019    INR goal:   2.0-3.0   TTR:   76.4 % (9.9 mo)   INR used for dosin.8 (2019)   Warfarin maintenance plan:   5 mg (2.5 mg x 2) every day   Full warfarin instructions:   5 mg every day   Weekly warfarin total:   35 mg   Plan last modified:   Sophy Griggs RN (2019)   Next INR check:   2019   Priority:   INR   Target end date:   Indefinite    Indications    Heart failure (H) [I50.9]  LVAD (left ventricular assist device) present (H) [Z95.811]             Anticoagulation Episode Summary     INR check location:       Preferred lab:       Send INR reminders to:   [facility]    Comments:   LVAD implanted 18  3  ASA 81mg Daily Has 2.5mg tablets   [place] lab iff=[phone].  Faxed S.O there on 2019 Follow INR's only no Chromogenic X's      Anticoagulation Care Providers     Provider Role Specialty Phone number    Jenni Ortiz MD Responsible Cardiology [phone]            See the Encounter Report to view Anticoagulation Flowsheet and Dosing Calendar (Go to Encounters tab in chart review, and find the Anticoagulation Therapy Visit)  Spoke with  patient.  Patient had LVAD placed on:   12/31/2018  Type of LVAD: HM3  Patient's current Aspirin dose: 81mg  LVAD Protocol followed: yes   Nasreen Kathleen RN

## 2019-11-27 ENCOUNTER — ANTICOAGULATION THERAPY VISIT (OUTPATIENT)
Dept: ANTICOAGULATION | Facility: CLINIC | Age: 59
End: 2019-11-27

## 2019-11-27 DIAGNOSIS — I50.9 HEART FAILURE (H): ICD-10-CM

## 2019-11-27 DIAGNOSIS — Z95.811 LVAD (LEFT VENTRICULAR ASSIST DEVICE) PRESENT (H): ICD-10-CM

## 2019-11-27 LAB — INR PPP: 3

## 2019-11-27 NOTE — PROGRESS NOTES
ANTICOAGULATION FOLLOW-UP CLINIC VISIT    Patient Name:  Mariusz Sharp  Date:  11/27/2019  Contact Type:  Telephone    SUBJECTIVE:  Patient Findings     Comments:   INR 's are trending up but done via fingerstick.  Patient has an appointment on 12/4/19 and labs will be done via vein. No change to warfarin dose.         Clinical Outcomes     Comments:   INR 's are trending up but done via fingerstick.  Patient has an appointment on 12/4/19 and labs will be done via vein. No change to warfarin dose.            OBJECTIVE    INR   Date Value Ref Range Status   11/27/2019 3.0  Final     Chromogenic Factor 10   Date Value Ref Range Status   01/11/2019 72 70 - 130 % Final     Comment:     Therapeutic Range:  A Chromogenic Factor 10 level of approximately 20-40%   inversely correlates with an INR of 2-3 for patients receiving Warfarin.   Chromogenic Factor 10 levels below 20% indicate an INR greater than 3 and   levels above 40% indicate an INR less than 2.         ASSESSMENT / PLAN  INR assessment THER    Recheck INR In: 1 WEEK    INR Location Outside lab      Anticoagulation Summary  As of 11/27/2019    INR goal:   2.0-3.0   TTR:   76.9 % (10.2 mo)   INR used for dosing:   3.0 (11/27/2019)   Warfarin maintenance plan:   5 mg (2.5 mg x 2) every day   Full warfarin instructions:   5 mg every day   Weekly warfarin total:   35 mg   Plan last modified:   Sophy Griggs RN (11/1/2019)   Next INR check:   12/4/2019   Priority:   Critical   Target end date:   Indefinite    Indications    Heart failure (H) [I50.9]  LVAD (left ventricular assist device) present (H) [Z95.811]             Anticoagulation Episode Summary     INR check location:       Preferred lab:       Send INR reminders to:   [facility]    Comments:   LVAD implanted 12/31/18  3  ASA 81mg Daily Has 2.5mg tablets   [place] lab oxe=[phone].  Faxed S.O there on 2/25/2019 7/9/19 Follow INR's only no Chromogenic X's      Anticoagulation Care Providers      Provider Role Specialty Phone number    Jenni Ortiz MD Responsible Cardiology [phone]            See the Encounter Report to view Anticoagulation Flowsheet and Dosing Calendar (Go to Encounters tab in chart review, and find the Anticoagulation Therapy Visit)    Spoke with patient.     Sophy Griggs RN

## 2019-12-02 DIAGNOSIS — I50.22 CHRONIC SYSTOLIC CONGESTIVE HEART FAILURE (H): Primary | ICD-10-CM

## 2019-12-04 ENCOUNTER — ANTICOAGULATION THERAPY VISIT (OUTPATIENT)
Dept: ANTICOAGULATION | Facility: CLINIC | Age: 59
End: 2019-12-04

## 2019-12-04 ENCOUNTER — ANCILLARY PROCEDURE (OUTPATIENT)
Dept: CARDIOLOGY | Facility: CLINIC | Age: 59
End: 2019-12-04
Attending: INTERNAL MEDICINE
Payer: COMMERCIAL

## 2019-12-04 ENCOUNTER — ALLIED HEALTH/NURSE VISIT (OUTPATIENT)
Dept: TRANSPLANT | Facility: CLINIC | Age: 59
End: 2019-12-04
Attending: INTERNAL MEDICINE
Payer: COMMERCIAL

## 2019-12-04 VITALS
SYSTOLIC BLOOD PRESSURE: 70 MMHG | OXYGEN SATURATION: 95 % | HEART RATE: 60 BPM | WEIGHT: 201.3 LBS | HEIGHT: 64 IN | TEMPERATURE: 98.2 F | BODY MASS INDEX: 34.37 KG/M2

## 2019-12-04 DIAGNOSIS — Z95.811 LVAD (LEFT VENTRICULAR ASSIST DEVICE) PRESENT (H): ICD-10-CM

## 2019-12-04 DIAGNOSIS — I50.22 CHRONIC SYSTOLIC CONGESTIVE HEART FAILURE (H): ICD-10-CM

## 2019-12-04 DIAGNOSIS — Z95.810 ICD (IMPLANTABLE CARDIOVERTER-DEFIBRILLATOR), BIVENTRICULAR, IN SITU: ICD-10-CM

## 2019-12-04 DIAGNOSIS — Z76.82 AWAITING ORGAN TRANSPLANT: ICD-10-CM

## 2019-12-04 DIAGNOSIS — Z94.1 HEART REPLACED BY TRANSPLANT (H): ICD-10-CM

## 2019-12-04 DIAGNOSIS — Z76.82 HEART TRANSPLANT CANDIDATE: Primary | ICD-10-CM

## 2019-12-04 DIAGNOSIS — Z76.82 AWAITING ORGAN TRANSPLANT: Primary | ICD-10-CM

## 2019-12-04 DIAGNOSIS — I50.9 HEART FAILURE (H): Primary | ICD-10-CM

## 2019-12-04 DIAGNOSIS — I50.9 HEART FAILURE (H): ICD-10-CM

## 2019-12-04 LAB
6 MIN WALK (FT): 1450 FT
6 MIN WALK (M): 442 M
ALBUMIN SERPL-MCNC: 3.8 G/DL (ref 3.4–5)
ALBUMIN UR-MCNC: 30 MG/DL
ALP SERPL-CCNC: 48 U/L (ref 40–150)
ALT SERPL W P-5'-P-CCNC: 56 U/L (ref 0–70)
ANION GAP SERPL CALCULATED.3IONS-SCNC: 6 MMOL/L (ref 3–14)
APPEARANCE UR: ABNORMAL
AST SERPL W P-5'-P-CCNC: 57 U/L (ref 0–45)
BACTERIA #/AREA URNS HPF: ABNORMAL /HPF
BILIRUB SERPL-MCNC: 1.1 MG/DL (ref 0.2–1.3)
BILIRUB UR QL STRIP: NEGATIVE
BUN SERPL-MCNC: 25 MG/DL (ref 7–30)
CALCIUM SERPL-MCNC: 8.7 MG/DL (ref 8.5–10.1)
CHLORIDE SERPL-SCNC: 102 MMOL/L (ref 94–109)
CO2 SERPL-SCNC: 22 MMOL/L (ref 20–32)
COLOR UR AUTO: YELLOW
CREAT SERPL-MCNC: 1.65 MG/DL (ref 0.66–1.25)
D DIMER PPP FEU-MCNC: 0.4 UG/ML FEU (ref 0–0.5)
ERYTHROCYTE [DISTWIDTH] IN BLOOD BY AUTOMATED COUNT: 13.7 % (ref 10–15)
GFR SERPL CREATININE-BSD FRML MDRD: 45 ML/MIN/{1.73_M2}
GLUCOSE SERPL-MCNC: 258 MG/DL (ref 70–99)
GLUCOSE UR STRIP-MCNC: NEGATIVE MG/DL
HCT VFR BLD AUTO: 37.5 % (ref 40–53)
HGB BLD-MCNC: 11.9 G/DL (ref 13.3–17.7)
HGB UR QL STRIP: ABNORMAL
INR PPP: 3.05 (ref 0.86–1.14)
KETONES UR STRIP-MCNC: NEGATIVE MG/DL
LDH SERPL L TO P-CCNC: 156 U/L (ref 85–227)
LEUKOCYTE ESTERASE UR QL STRIP: NEGATIVE
MCH RBC QN AUTO: 28.8 PG (ref 26.5–33)
MCHC RBC AUTO-ENTMCNC: 31.7 G/DL (ref 31.5–36.5)
MCV RBC AUTO: 91 FL (ref 78–100)
MUCOUS THREADS #/AREA URNS LPF: PRESENT /LPF
NITRATE UR QL: NEGATIVE
PH UR STRIP: 5 PH (ref 5–7)
PLATELET # BLD AUTO: 221 10E9/L (ref 150–450)
POTASSIUM SERPL-SCNC: 4.5 MMOL/L (ref 3.4–5.3)
PROT SERPL-MCNC: 7.1 G/DL (ref 6.8–8.8)
RBC # BLD AUTO: 4.13 10E12/L (ref 4.4–5.9)
RBC #/AREA URNS AUTO: >182 /HPF (ref 0–2)
SODIUM SERPL-SCNC: 130 MMOL/L (ref 133–144)
SOURCE: ABNORMAL
SP GR UR STRIP: 1.01 (ref 1–1.03)
UROBILINOGEN UR STRIP-MCNC: 0 MG/DL (ref 0–2)
WBC # BLD AUTO: 6.3 10E9/L (ref 4–11)
WBC #/AREA URNS AUTO: 15 /HPF (ref 0–5)

## 2019-12-04 PROCEDURE — 40000866 ZZHCL STATISTIC HIV 1/2 ANTIGEN/ANTIBODY PRETRANSPLANT ONLY: Performed by: INTERNAL MEDICINE

## 2019-12-04 PROCEDURE — G0463 HOSPITAL OUTPT CLINIC VISIT: HCPCS | Mod: 25,ZF

## 2019-12-04 PROCEDURE — 86780 TREPONEMA PALLIDUM: CPT | Performed by: INTERNAL MEDICINE

## 2019-12-04 PROCEDURE — 99214 OFFICE O/P EST MOD 30 MIN: CPT | Mod: 25 | Performed by: INTERNAL MEDICINE

## 2019-12-04 PROCEDURE — 81001 URINALYSIS AUTO W/SCOPE: CPT | Performed by: INTERNAL MEDICINE

## 2019-12-04 PROCEDURE — 86803 HEPATITIS C AB TEST: CPT | Performed by: INTERNAL MEDICINE

## 2019-12-04 PROCEDURE — 85379 FIBRIN DEGRADATION QUANT: CPT | Performed by: INTERNAL MEDICINE

## 2019-12-04 PROCEDURE — 93750 INTERROGATION VAD IN PERSON: CPT | Mod: ZF | Performed by: INTERNAL MEDICINE

## 2019-12-04 RX ORDER — HYDRALAZINE HYDROCHLORIDE 50 MG/1
50 TABLET, FILM COATED ORAL 3 TIMES DAILY
Qty: 180 TABLET | Refills: 5 | Status: ON HOLD | COMMUNITY
Start: 2019-12-04 | End: 2020-05-29

## 2019-12-04 ASSESSMENT — ENCOUNTER SYMPTOMS
NERVOUS/ANXIOUS: 0
HYPERTENSION: 1
NECK MASS: 0
PANIC: 0
HEMATURIA: 1
LIGHT-HEADEDNESS: 1
SORE THROAT: 0
ORTHOPNEA: 0
LEG PAIN: 0
SINUS CONGESTION: 0
PALPITATIONS: 1
DYSURIA: 0
INSOMNIA: 1
TROUBLE SWALLOWING: 0
SMELL DISTURBANCE: 0
TASTE DISTURBANCE: 0
FLANK PAIN: 0
SLEEP DISTURBANCES DUE TO BREATHING: 0
DECREASED CONCENTRATION: 0
EXERCISE INTOLERANCE: 1
DIFFICULTY URINATING: 0
SYNCOPE: 0
HYPOTENSION: 0
SINUS PAIN: 0
HOARSE VOICE: 0
DEPRESSION: 1

## 2019-12-04 ASSESSMENT — PAIN SCALES - GENERAL: PAINLEVEL: NO PAIN (0)

## 2019-12-04 ASSESSMENT — MIFFLIN-ST. JEOR: SCORE: 1649.09

## 2019-12-04 NOTE — NURSING NOTE
Outpatient Clinic Note 12/04/19    Met with patient and Dr. Ortiz and patient's VAD coordinator.    Discussed the plan for transplant follow up:      Follow up appointments made for March when patient returns to clinic.  Will draw labs at local clinic next week.      Dental:  Up to date, last seen 6 months ago    Vaccination:  Patient to follow up with PCP for flu vaccine and completion of Hep B series    Pulmonary function tests stable, discussed in clinic.    Patient verbalized understanding of the plan and agrees to call Transplant Coordinator with any further questions or concerns at [phone].

## 2019-12-04 NOTE — NURSING NOTE
1). PUMP DATA  Primary controller serial number: [id number]     3:   Flow: 3.7 L/min,    Speed: 5300 RPMs,     PI: 5.5 ,  Power: 4.0 Parker, Hct: 20  *hematocrit lab was 37.5 today, set at 20 for Low Flows     Primary controller   Back up battery: Patient use: 27, Replace in: 16  Months     Data downloaded: No   Equipment and driveline assessed for damage: Yes     Back up : Serial number: [id number]  Back up battery: Patient use: 7 Replace in: 20  Months  Programmed settings identical to the settings on the primary controller : N/A      Education complete: Yes   Charge the BACKUP controller s backup battery every 6 months  Perform a self test on BACKUP every 6 months  Change the MPU s batteries every 6 months:Yes    2). ALARMS  Alarms reported by patient since last pump evaluation: Yes- Low Flows  Alarms or other finding noted during pump data history and alarm download: Yes- many PI events with occasional speed drops. PI Range 2.7-6.7    Action Taken:  Reviewed data with patient: Yes      3). DRESSING CHANGE / DRIVELINE ASSESSMENT  Dressing change completed today: No  Appearance of Driveline site: CDI per patient- weekly dressings    Driveline stabilization: Method: [employer]  [ Teaching reinforced on need for stabilization of Driveline. ]    4).Pt. Education  D:  Pt education provided.  I:  Educated on MyChart  1.  How to message VAD Coordinator (send to MD but address to VAD Coordinator)  2. How to send photo via My Chart  3. When to use MyChart vs Call VAD Coordinator on Call.    A:  Pt verbalized understanding.  Ptis already signed up for MY Chart.    P:  VAD Coordinator available for questions or concerns.  Will continue VAD education.

## 2019-12-04 NOTE — PATIENT INSTRUCTIONS
Medications:  1. STOP taking amiodarone  2. CHANGE hydralazine to 50mg three times a day  3. Starting Sunday, if you're feeling good, take 25mg Carvidelol in morning with the rest of your pills. Eventually we will work on changing your carvidelol dose.      Follow-up:  1. You have a follow up appointment in 3 months (3/4/2020)  2. We will call you in a week to check in with your hydralazine change    Instructions:  1. We will plan to change your controller's Low Flow setting at your next clinic appointment. Call us before then if you notice more alarms, or feel symptomatic with it.  2. Get a flu vaccine for this season    Page the VAD Coordinator on call if you gain more than 3 lb in a day or 5 in a week. Please also page if you feel unwell or have alarms.     Great to see you in clinic today. To Page the VAD Coordinator on call, dial [phone] option #4 and ask to speak to the VAD coordinator on call.

## 2019-12-04 NOTE — PATIENT INSTRUCTIONS
It was a pleasure to see you in clinic today.  Please do not hesitate to call with any questions or concerns.  We look forward to seeing you in clinic at your next device check in 3 months.    Christina Arroyo, RN, MS, CCRN  Electrophysiology Nurse Clinician  [facility]    During Business Hours Please Call:  [phone]  After Hours Please Call:  [phone] - select option #4 and ask for job code [phone]

## 2019-12-04 NOTE — LETTER
Patient Name: Mariusz Sharp     : 1960     Diagnosis/ICD-10: Heart Failure, unspecified I50.9; LVAD Z95.811   Requesting Physician: Dr. Jenni Ortiz   Date of Request: 19     Date to Draw 2019     **Please fax results to Abraham Butterfield VAD Coord at [phone].                Call [phone] with Questions      ORDER CODE TESTS YOHANA.VOL.   x CBASIC Na, K, Cl, CO2, Crea, BUN, Glu, Ca GG 0.5-1    BHEPAT Alb, AlkP, ALT, AST, BBil, TP GG 0.5-1    BLIP Chol, Trig, HDL, LDL GG 1-2    CCOMP Na, K, Cl, CO2, Crea, BUN, Glu, Ca, Alb, AlkP, ALT, AST, Bbil, TP,  GG 0.6-1    HGP CBC & Platelet P 0.3-1    HGDP CBC, Differential & Platelet P 0.3-1    PLT Platelet  P 0.3-1    INR INR B 2.7    PTT PTT B 2.7    LD Lactate Dehydrogenase GG 0.3-1    PHGB Plasma Hemoglobin GG 2-3    Pre-Albumin Pre-Albumin RG 1.0                  Signed,      Jenni Ortiz MD  Heart Failure, Mechanical Circulatory Support and Transplant Cardiology   of Medicine,  Division of Cardiology, [facility]

## 2019-12-04 NOTE — LETTER
12/4/2019      RE: Mariusz Sharp  [address] [facility] [address]       Dear Colleague,    Thank you for the opportunity to participate in the care of your patient, Mariusz Sharp, at the [facility] HEART [facility] at [facility]. Please see a copy of my visit note below.    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    His LVAD postoperative course was complicated by mild RV dysfunction  Requiring dobutamine but he was successfully  discharged to TCU. He had a mechanical fall with CTH negative for bleed and has been seen at core clinic with low flow alarms that are asymptomatic thought to be related to afterload and volume status. CT was done there is no problem with the LVAD positioning or evidence of outflow graft obstruction/twist.  Pt was also hospitalized with PA catheter placement and hemodynamic monitoring and no hemodynamic, arrhythmogenic or mechanical issues could be identified for the low flow alarms.      Today patient reports that he has been feeling well. Low flow alarms continue, although have decreased somewhat from prior. Remain asymptomatic. He denies any chest pain or pressure, sob/faustin, orthopnea, pnd, palpitations, syncope, gasper, LVAD driveline tenderness or discharge. He denies any bleeding in his stool. If he goes for a long walk, he has some blood in his urine, this is unchanged for him, self-limiting, has been seen by urology and had cystoscopy and no cause has been identified.     He also denies any problems with his medications and reports compliance. However, he has been taking his coreg differently than prescribed because when he took 25mg in the morning and 12.5 mg at night, he felt  extremely dizzy and unwell after the 25 mg dose. He started taking 12.5 mg TID and has been feeling well with that method.       PAST MEDICAL HISTORY:  Past Medical History:   Diagnosis Date     Acute kidney injury (H)      CKD (chronic kidney disease)      Coronary artery disease      Diabetes mellitus (H)      HTN (hypertension)      Hyperlipidemia      Ischemic cardiomyopathy      LV (left ventricular) mural thrombus      Paroxysmal atrial flutter (H)      RVF (right ventricular failure) (H)      Systolic heart failure (H)      Ventricular tachycardia (H)        FAMILY HISTORY:  Reviewed    SOCIAL HISTORY:  Social History     Social History     Marital status: Single     Spouse name: N/A     Number of children: N/A     Years of education: N/A     Social History Main Topics     Smoking status: Never Smoker     Smokeless tobacco: Never Used     Alcohol use Not on file     Drug use: Not on file     Sexual activity: Not on file     Other Topics Concern     Not on file     Social History Narrative       CURRENT MEDICATIONS:  Outpatient Medications Prior to Visit   Medication Sig Dispense Refill     aspirin (ASA) 81 MG chewable tablet Take 1 tablet (81 mg) by mouth daily 90 tablet 3     carvedilol (COREG) 12.5 MG tablet Take 25 mg in the am and 12.5 mg in the afternoon 90 tablet 3     digoxin (LANOXIN) 125 MCG tablet Take 1 tablet (125 mcg) by mouth four times a week 90 tablet 3     famotidine (PEPCID) 20 MG tablet Take 1 tablet (20 mg) by mouth 2 times daily 180 tablet 3     hydrALAZINE (APRESOLINE) 50 MG tablet Take 1 tablet (50 mg) by mouth 3 times daily 180 tablet 5     insulin aspart (NOVOLOG PEN) 100 UNIT/ML pen Prandial Dosin units per 7 grams of carbohydrate each Meal or Snack.  Only chart total amount of units given.  Do not give if pre-prandial glucose is less than 60 mg/dL. If given at mealtime, administer within 30 minutes of start of meal. 3 mL 1     insulin glargine (LANTUS SOLOSTAR PEN) 100 UNIT/ML  pen Inject 24 Units Subcutaneous At Bedtime 3 mL 11     losartan (COZAAR) 25 MG tablet Take 1 tablet (25 mg) by mouth daily 30 tablet 11     rosuvastatin (CRESTOR) 20 MG tablet Take 1 tablet (20 mg) by mouth At Bedtime 90 tablet 3     warfarin (COUMADIN) 2.5 MG tablet Take two to three tablets daily or as directed by the Coumadin clinic 150 tablet 5     insulin aspart (NOVOLOG PEN) 100 UNIT/ML pen Inject 1-10 Units Subcutaneous 3 times daily (before meals) Correction Scale - HIGH INSULIN RESISTANCE DOSING     -164 =1 units.   -189 =2.   -214 =3.   -239 =4.   -264 =5.   -289 =6.   -314 =7.   -339 =8.   -364 =9.  BG greater than or equal to 365 give 10 units  To be given with meal insulin, based on pre-meal BG 9 mL 0     insulin aspart (NOVOLOG PEN) 100 UNIT/ML pen Inject 1-7 Units Subcutaneous At Bedtime HIGH INSULIN RESISTANCE DOSING    Bedtime  For  - 224 give 1 units.   For  - 249 give 2 units.   For  - 274 give 3 units.   For  - 299 give 4 units.   For  - 324 give 5 units.   For  - 349 give 6 units.   For BG greater than or equal to 350 give 7 units. (Patient not taking: Reported on 2/11/2019) 2.1 mL 0     nitroGLYcerin (NITROSTAT) 0.4 MG sublingual tablet Place 1 tablet (0.4 mg) under the tongue every 5 minutes as needed for chest pain For chest pain place 1 tablet under the tongue every 5 minutes for 3 doses. If symptoms persist 5 minutes after 1st dose call 911. 10 tablet 0     amiodarone (PACERONE/CODARONE) 200 MG tablet Take 1 tablet (200 mg) by mouth daily 90 tablet 3     hydrALAZINE (APRESOLINE) 50 MG tablet Take 2 tablets (100 mg) by mouth 3 times daily 180 tablet 5     No facility-administered medications prior to visit.        ROS:   CONSTITUTIONAL: Denies fever, chills, fatigue, or weight fluctuations.   HEENT: Denies headache and changes in speech.  CV: Refer to HPI.   PULMONARY:Denies shortness of breath, cough,  "or previous TB exposure.   GI:Denies nausea, vomiting, diarrhea, and abdominal pain. Bowel movements are regular.   :See HPI  EXT:Denies lower extremity edema.   SKIN:Denies abnormal rashes or lesions.   MUSCULOSKELETAL:Denies upper or lower extremity weakness and pain.   NEUROLOGIC:Denies seizures, or upper or lower extremity paresthesia.     EXAM:  BP (!) 70/0 (BP Location: Right arm, Patient Position: Chair, Cuff Size: Adult Regular)   Pulse 60   Temp 98.2  F (36.8  C)   Ht 1.626 m (5' 4\")   Wt 91.3 kg (201 lb 4.8 oz)   SpO2 95%   BMI 34.55 kg/m     GENERAL: Appears alert and oriented times three. Joyful and energetic.  NECK: Supple and without lymphadenopathy. JVD ~6cm.  CV: LVAD Hum  RESPIRATORY: Respirations regular, even, and unlabored. Lungs CTA throughout.   GI: Soft and non distended with normoactive bowel sounds present in all quadrants. No tenderness, rebound, guarding. No organomegaly.    EXTREMITIES: No peripheral edema. .   NEUROLOGIC: Alert and interacting appropriatly. CN II-XII grossly intact. No focal deficits.   MUSCULOSKELETAL: No joint swelling or tenderness.   SKIN: No jaundice. No rashes or lesions.     Labs:  Orders Only on 08/07/2019   Component Date Value Ref Range Status     D Dimer 08/07/2019 0.6* 0.0 - 0.50 ug/ml FEU Final    Comment: This D-dimer assay is intended for use in conjunction with a clinical pretest   probability assessment model to exclude pulmonary embolism (PE) and deep   venous thrombosis (DVT) in outpatients suspected of PE or DVT. The cut-off   value is 0.5 ug/mL FEU.       Lactate Dehydrogenase 08/07/2019 146  85 - 227 U/L Final     INR 08/07/2019 2.71* 0.86 - 1.14 Final     WBC 08/07/2019 6.7  4.0 - 11.0 10e9/L Final     RBC Count 08/07/2019 4.41  4.4 - 5.9 10e12/L Final     Hemoglobin 08/07/2019 13.1* 13.3 - 17.7 g/dL Final     Hematocrit 08/07/2019 39.5* 40.0 - 53.0 % Final     MCV 08/07/2019 90  78 - 100 fl Final     MCH 08/07/2019 29.7  26.5 - 33.0 pg " Final     MCHC 2019 33.2  31.5 - 36.5 g/dL Final     RDW 2019 14.6  10.0 - 15.0 % Final     Platelet Count 2019 243  150 - 450 10e9/L Final     Sodium 2019 138  133 - 144 mmol/L Final     Potassium 2019 4.0  3.4 - 5.3 mmol/L Final     Chloride 2019 107  94 - 109 mmol/L Final     Carbon Dioxide 2019 25  20 - 32 mmol/L Final     Anion Gap 2019 6  3 - 14 mmol/L Final     Glucose 2019 261* 70 - 99 mg/dL Final     Urea Nitrogen 2019 25  7 - 30 mg/dL Final     Creatinine 2019 1.82* 0.66 - 1.25 mg/dL Final     GFR Estimate 2019 40* >60 mL/min/[1.73_m2] Final    Comment: Non  GFR Calc  Starting 2018, serum creatinine based estimated GFR (eGFR) will be   calculated using the Chronic Kidney Disease Epidemiology Collaboration   (CKD-EPI) equation.       GFR Estimate If Black 2019 47* >60 mL/min/[1.73_m2] Final    Comment:  GFR Calc  Starting 2018, serum creatinine based estimated GFR (eGFR) will be   calculated using the Chronic Kidney Disease Epidemiology Collaboration   (CKD-EPI) equation.       Calcium 2019 8.2* 8.5 - 10.1 mg/dL Final     Bilirubin Total 2019 0.8  0.2 - 1.3 mg/dL Final     Albumin 2019 3.5  3.4 - 5.0 g/dL Final     Protein Total 2019 7.1  6.8 - 8.8 g/dL Final     Alkaline Phosphatase 2019 51  40 - 150 U/L Final     ALT 2019 43  0 - 70 U/L Final     AST 2019 34  0 - 45 U/L Final         Diagnostics:  Recent Results (from the past 4320 hour(s))   ECHO COMPLETE WITH OPTISON    Narrative    [id number]  [id number]  [id number]  [id number]^STEFANIA^MANNY^V           [facility] [place]  Echocardiography Laboratory  [address] [place] [id number]     Name: ADILSON RINCON  MRN: [id number]  : 1960  Study Date: 2018 11:27 AM  Age: 57 yrs  Gender: Male  [place] [facility] [place] For Study: Cardiac  Arrest  Ordering Physician: MANNY AGUIAR  Referring Physician: SELF, REFERRED  Performed By: Laya Read RDCS     BSA: 1.8 m2  Height: 64 in  Weight: 170 lb  HR: 78  BP: 99/76 mmHg  _____________________________________________________________________________  __        Procedure  Echocardiogram with two-dimensional, color and spectral Doppler performed.  Contrast Optison. Optison (NDC #5848-4467-30) given intravenously. Patient was  given 5 ml mixture of 3 ml Optison and 6 ml saline. 4 ml wasted.  _____________________________________________________________________________  __        Interpretation Summary  Left ventricular wall thickness is normal. Borderline left ventricular  dilation is present. LVIDd is 5.19cm The Ejection Fraction is estimated at 20-  25%. The mid to distal anteroseptum, mid to distal anterior wall, mid to  distal anterolateral wall and mid to apical septum and apex are akinetic. This  is consistent with large LAD territory infarct. Laminar thrombus is seen in  the LV apex  The right ventricle is normal size. Difficult to evaluate RV function but it  appears to be mild to moderately reduced.  Moderate tricuspid insufficiency is present. Right ventricular systolic  pressure is 26mmHg above the right atrial pressure.  Dilation of the inferior vena cava is present with normal respiratory  variation in diameter. Estimated mean right atrial pressure is 8 mmHg (mildly  elevated).  No pericardial effusion is present.     Previous study not available for comparison.  _____________________________________________________________________________  __        Left Ventricle  Left ventricular wall thickness is normal. Borderline left ventricular  dilation is present. LVIDd is 5.19cm. Grade III or advanced diastolic  dysfunction. The Ejection Fraction is estimated at 20-25%. The mid to distal  anteroseptum, mid to distal anterior  wall, mid to distal anterolateral wall and mid to apical septum  and apex are  akinetic. This is consistent with large LAD territory infarct. Laminar  thrombus is seen in the LV apex.     Right Ventricle  Right ventricular wall thickness is normal. The right ventricle is normal  size. Difficult to evaluate RV function but it appears to be mild to  moderately reduced. A pacemaker lead is noted in the right ventricle.     Atria  Moderate left atrial enlargement is present. Moderate right atrial enlargement  is present. A pacemaker lead is noted in the right atrium.     Mitral Valve  The mitral valve is normal. Trace mitral insufficiency is present.        Aortic Valve  Aortic valve is normal in structure and function. The aortic valve is  tricuspid.     Tricuspid Valve  The tricuspid valve is normal. Moderate tricuspid insufficiency is present.  The right ventricular systolic pressure is approximated at 26.0 mmHg plus the  right atrial pressure. Right ventricular systolic pressure is 26mmHg above the  right atrial pressure.     Pulmonic Valve  The pulmonic valve is normal. Trace pulmonic insufficiency is present.     Vessels  The aorta root is normal. Dilation of the inferior vena cava is present with  normal respiratory variation in diameter. Estimated mean right atrial pressure  is 8 mmHg (mildly elevated).     Pericardium  No pericardial effusion is present.        Compared to Previous Study  Previous study not available for comparison.  _____________________________________________________________________________  __  MMode/2D Measurements & Calculations  IVSd: 0.74 cm     LVIDd: 5.2 cm  LVIDs: 4.0 cm  LVPWd: 1.0 cm  FS: 23.3 %  LV mass(C)d: 167.6 grams  LV mass(C)dI: 91.8 grams/m2  asc Aorta Diam: 3.6 cm  LVOT diam: 2.1 cm  LVOT area: 3.5 cm2  LA Volume (BP): 81.1 ml  LA Volume Index (BP): 44.3 ml/m2  RWT: 0.40           Doppler Measurements & Calculations  MV E max sasha: 101.0 cm/sec  MV A max sasha: 16.5 cm/sec  MV E/A: 6.1  MV dec slope: 660.0 cm/sec2  PA V2 max: 96.5  cm/sec  PA max PG: 3.7 mmHg  PA acc time: 0.11 sec  TR max sasha: 255.0 cm/sec  TR max P.0 mmHg  E/E' av.8  Lateral E/e': 11.0  Medial E/e': 34.5     _____________________________________________________________________________  __           Report approved by: LEÓN Powell 2018 02:05 PM                     [facility] 18  Right Heart Catheterization:  /95/112  HR 61  BSA 1.91  RA    RV   PA 18   PCW 15/13/12   William CO 3.7   William CI 2.0   TD CO 4.6   TD CI 2.5   PA sat 51.8%  PCW sat 95.6%   Hgb 9.1 g/dL   PVR 1.6    C 19  Pressures Phase: Baseline    Time Systolic Diastolic Mean A Wave V Wave EDP Max dp/dt HR   RA Pressures 12:28 PM   10 mmHg    14 mmHg    12 mmHg      53 bpm      RV Pressures 12:07 PM       288 mmHg/sec        12:29 PM 30 mmHg    8 mmHg       13 mmHg     53 bpm      PA Pressures 12:32 PM 30 mmHg    11 mmHg    18 mmHg        53 bpm      PCW Pressures 12:30 PM   14 mmHg    17 mmHg    17 mmHg      52 bpm      Blood Flow Results Phase: Baseline    Time Results Indexed Values   QP 12:07 PM 3.85 L/min    1.99 L/min/m2      QS 12:07 PM 3.85 L/min    1.99 L/min/m2      Blood Oximetry Phase: Baseline    Time Hb SAT(%) PO2 Content PA Sat   PA 12:07 PM  61.4 %      61.4 %      Art 12:07 PM  97 %     15.7 mL/dL       Cardiac Output Phase: Baseline    Time TDCO TDCI William C.O. William C.I. William HR   Cardiac Output Results 12:07 PM   3.85 L/min    1.99 L/min/m2       Resistance Results Phase: Baseline    Time PVR SVR PVR-I SVR-I TPR TVR TPR-I TVR-I PVR/SVR TPR/TVR   Resistance Results (Metric) 12:07 PM 83.09 dsc-5     161.14 dsc-5/m2     373.91 dsc-5     725.13 dsc-5/m2         Resistance Results (Wood) 12:07 PM 1.04 LERMA     2.01 LERMA/m2     4.68 LERMA     9.07 LERMA/m2             Echo 19  Interpretation Summary  HM 3 at 5300RPM.  Severely (EF 20-30%) reduced left ventricular function is present. LVIDd:4.6  cm. Septum midline.  Left ventricular size is normal.  Global right  ventricular function is mildly reduced.  Moderate right ventricular dilation is present.  AV is not seen well, No AI.  Inflow velocity cannot be assessed. Outflow velocity is normal.  The inferior vena cava was normal in size with preserved respiratory  variability.  No pericardial effusion is present.  This study was compared with the study from 6/26/2019 .There has been no  change including RV function.    ICD interrogation:  Patient seen in clinic for evaluation and iterative programming of his [employer] single lead ICD per MD orders. Patient has an appointment to see Dr. Ortiz today. Normal ICD function. No arrhythmias recorded. Intrinsic rhythm = regular ventricular rhythm @ 50 bpm.  = <1%. Estimated battery longevity to QUIQUE = 12 years. Lead trends appear stable. Patient reports that he is feeling well and denies complaints. Plan for patient to return to clinic in 3 months. ALEX Arroyo RN     Single lead ICD    LVAD interrogation:   HM 3:   Flow: 3.7 L/min,    Speed: 5300 RPMs,     PI:  5.5 ,  Power:  4.0 Parker, Hct: 20 (set to lowest level to decrease low flow alarms)   2). ALARMS  Alarms reported by patient since last pump evaluation: Yes- Low Flows  Alarms or other finding noted during pump data history and alarm download: Yes- many PI events with occasional speed drops. 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    He continues to have asymptomatic low flow alarms, although somewhat decreased from prior, he has had an extensive negative work-up as above.     He had a lot of dizziness with 25 mg of coreg and has had to be  his dosing. I think he has likely been hypotensive at home and so we  will decrease his afterload today. Otherwise he has been stable, appears euvolemic today, labs are stable with the exception of low sodium.    Chronic systolic heart failure secondary to ICM s/p HM3 12/31/18  Stage C, NYHA Class III  ACEi/ARB: contionue losartan 25 mg at bedtime, no changes given renal dysfunction  VASODILATORS:  Decrease hydralazine to 50 mg q8h  BB  Continue coreg, if he is feeling okay in 3-4 days, will try taking coreg 25/12 (rather than 12.5 TID), if he is not dizzy with this dosing, we will consdier increasing coreg to 25 mg BID in a week or two  SCD prophylaxis ICD.  Given persistent LVAD alarms with low flows.  Have decreased in frequency.  Prior hospitalization with PA catheter placement and hemodynamic monitoring and no hemodynamic, arrhythmogenic or mechanical issues could be identified for the low flow alarms.  Symptoms with these events have greatly improved/resolved. We will look into getting him a new controller with a lower low flow limit.  LDH: stable at 156  MAP: 70, antihypertensive changes as above  Antiplatelet: ASA 81 mg  Anticoagulation: coumadin with INR goal of 2-3      Hyponatremia. 130 today. Will recheck in one week to ensure this is improving.      CAD. s/p STEMI with ALYSSA to LAD 6/27/18  - Continue Crestor and BB    PAF. CHADSVASC-4. No arrhythmias on recent ICD checks  - Coumadin per AC.   - Continue BB  - STOP amiodarone    VT/VF. With cardiac arrest.   - No recent ICD shocks  - STOP amiodarone    LV thrombus.   - Coumadin as above.     CKD Stage III.   - Cr slightly elevated today.  Exam c/w with mild hypovolemia.  Encouraged increase in po intake.      Follow-up:  Phone call in two weeks. Recheck sodium in 1 week. F/u with NP in 3 months and with me in 6 months with device check and labs with both appointments.      This patient was seen as part of a shared visit with Dr. Ortiz.    Betsey Lindsay, PA-C  [facility]        I have seen and examined the patient  as part of a shared visit with TATYANA Shipman.  I agree with the note above. I reviewed today's vital signs and medications. I have reviewed and discussed with the advanced practice provider their physical examination, assessment, and plan   Briefly, pt with ischemic cardiomyopathy s/p LVAD  My key history/exam findings are: symptomatically hypotensive  Grossly euvolemic on exam .  The key management decisions made by me: decrease hydralazine to 50mg tid.  Stop amiodarone.      Jenni Ortiz MD  Section Head - Advanced Heart Failure, Transplantation and Mechanical Circulatory Support  Director - Adult Congenital and Cardiovascular [facility]etics Center  Associate Professor of Medicine, [facility]

## 2019-12-04 NOTE — PROGRESS NOTES
ANTICOAGULATION FOLLOW-UP CLINIC VISIT    Patient Name:  Mariusz Sharp  Date:  12/4/2019  Contact Type:  Telephone    SUBJECTIVE:         OBJECTIVE    INR   Date Value Ref Range Status   12/04/2019 3.05 (H) 0.86 - 1.14 Final     Chromogenic Factor 10   Date Value Ref Range Status   01/11/2019 72 70 - 130 % Final     Comment:     Therapeutic Range:  A Chromogenic Factor 10 level of approximately 20-40%   inversely correlates with an INR of 2-3 for patients receiving Warfarin.   Chromogenic Factor 10 levels below 20% indicate an INR greater than 3 and   levels above 40% indicate an INR less than 2.         ASSESSMENT / PLAN  INR assessment THER    Recheck INR In: 1 WEEK    INR Location Clinic      Anticoagulation Summary  As of 12/4/2019    INR goal:   2.0-3.0   TTR:   75.1 % (10.4 mo)   INR used for dosing:   3.05! (12/4/2019)   Warfarin maintenance plan:   5 mg (2.5 mg x 2) every day   Full warfarin instructions:   5 mg every day   Weekly warfarin total:   35 mg   No change documented:   Sandor Sanchez RN   Plan last modified:   Sophy Griggs RN (11/1/2019)   Next INR check:   12/11/2019   Priority:   Critical   Target end date:   Indefinite    Indications    Heart failure (H) [I50.9]  LVAD (left ventricular assist device) present (H) [Z95.811]             Anticoagulation Episode Summary     INR check location:       Preferred lab:       Send INR reminders to:    [facility]    Comments:   LVAD implanted 12/31/18  3  ASA 81mg Daily Has 2.5mg tablets   [place] suu=[phone].  Faxed S.O there on 2/25/2019 7/9/19 Follow INR's only no Chromogenic X's      Anticoagulation Care Providers     Provider Role Specialty Phone number    Jenni Ortiz MD Responsible Cardiology [phone]            See the Encounter Report to view Anticoagulation Flowsheet and Dosing Calendar (Go to Encounters tab in chart review, and find the Anticoagulation Therapy Visit)    INR/CFX/F2 RESULT:INR result is  3.05 at clinic today    ASSESSMENT:No Problems found. No Changes in Health, Medications, or diet. No Signs of bruising, bleeding or clotting.        DOSING ADJUSTMENT:No change to dosing.  Will monitor next INR and consider adjustments    NEXT INR/FACTOR X OR FACTOR II:12/11    PROTOCOL FOLLOWED:LVAD  HM 3 implanted 12/31/18  ASA 81mg    Sandor Sanchez, RN                  .

## 2019-12-05 ENCOUNTER — TELEPHONE (OUTPATIENT)
Dept: TRANSPLANT | Facility: CLINIC | Age: 59
End: 2019-12-05

## 2019-12-05 LAB
DLCOCOR-%PRED-PRE: 78 %
DLCOCOR-PRE: 18.32 ML/MIN/MMHG
DLCOUNC-%PRED-PRE: 72 %
DLCOUNC-PRE: 16.76 ML/MIN/MMHG
DLCOUNC-PRED: 23.22 ML/MIN/MMHG
ERV-%PRED-PRE: 94 %
ERV-PRE: 0.45 L
ERV-PRED: 0.48 L
EXPTIME-PRE: 8.42 SEC
FEF2575-%PRED-PRE: 114 %
FEF2575-PRE: 3.13 L/SEC
FEF2575-PRED: 2.72 L/SEC
FEFMAX-%PRED-PRE: 93 %
FEFMAX-PRE: 7.65 L/SEC
FEFMAX-PRED: 8.14 L/SEC
FEV1-%PRED-PRE: 97 %
FEV1-PRE: 2.97 L
FEV1FEV6-PRE: 82 %
FEV1FEV6-PRED: 79 %
FEV1FVC-PRE: 81 %
FEV1FVC-PRED: 79 %
FEV1SVC-PRE: 77 %
FEV1SVC-PRED: 76 %
FIFMAX-PRE: 3.35 L/SEC
FVC-%PRED-PRE: 95 %
FVC-PRE: 3.66 L
FVC-PRED: 3.83 L
HCV AB SERPL QL IA: NONREACTIVE
HIV 1+2 AB+HIV1 P24 AG SERPL QL IA: NONREACTIVE
IC-%PRED-PRE: 96 %
IC-PRE: 3.4 L
IC-PRED: 3.53 L
VA-%PRED-PRE: 101 %
VA-PRE: 5.33 L
VC-%PRED-PRE: 95 %
VC-PRE: 3.85 L
VC-PRED: 4.01 L

## 2019-12-05 NOTE — TELEPHONE ENCOUNTER
Provider Call: Transplant Lab/Orders  Route to LPN  Post Transplant Days:   When patient is less than 60 days post-transplant, route high priority  Reason for Call: Clarification; which order? most recent   Liver patients reporting abnormal lab results: Route to RN and Page  Document lab facility information when provider is calling about annual lab orders. Delete facility wildcards when not needed.    Callback needed? Yes    Return Call Needed  Same as documented in contacts section  When to return call?: Same day: Route High Priority

## 2019-12-06 NOTE — PROGRESS NOTES
Patient Name: Mariusz Sharp  : 1960  Age: 59 year old  MRN: [id number]  Date of Initial Social Work Evaluation: 2018    Patient on transplant wait list.  Saw today to update psychosocial assessment.      Presenting Information   Living Situation: Lives with his 83 year-old Father (with Alzheimer's) in [place]  If not local, plans for short term stay:  Will need local apartment/hotel during transplant recovery  Previous Functional Status: Independent with ADLs and able to drive. Recently traveled to [place] with 13 y/o Son  Cultural/Language/Spiritual Considerations: None    Support System  Primary Support Person Brother-Romeo, Son-Stepan, and Friend-Ryle  Other support:  N/A  Plan for support in immediate post-transplant period: Will stay in local housing and Brother, Son, and Friend will take turns providing 24-hour supervision/care as they did post-LVAD in January of this year.    Health Care Directive  Decision Maker: Patient  Alternate Decision Maker: Legally, would be 2 adult sons, but patient is currently estranged from oldest Son  Health Care Directive: Does not have HCD. Has been given education about how to complete. Provided another blank copy and explained need to have done. Reports would want his Brother and Son, Stepan as decision-makers    Mental Health/Coping:   History of Mental Health: No  History of Chemical Health: No known history  Current status: N/A  Coping: Reports good coping skills  Services Needed/Recommended: None identifed    Financial   Income: Approved for social security disability, which started in January/February of this year.  Impact of transplant on income: Should have no impact. Medications covered now with only $1 and $3 copays  Insurance and medication coverage: [facility]  Financial concerns: Paying for local lodging-MA paid for [address] after VAD  Resources needed: Will need [facility] again to assist with lodging during transplant  stay    Assessment and recommendations and plan:  Reviewed transplant education (Medicare, rehabilitation, donor issues, community/financial resources, and psych/family adjustment) as well as psychosocial risks of transplant. Provided written and verbal information about post-transplant lodging, etc. Patient seemed to process information well. Appeared well informed, motivated, and able to follow post transplant requirements. Behavior was appropriate during interview. Has adequate income and insurance coverage. Adequate social support. No major contraindications noted for transplant. At this time, patient appears to understand the risks and benefits of transplant.

## 2019-12-07 LAB
MDC_IDC_LEAD_IMPLANT_DT: NORMAL
MDC_IDC_LEAD_IMPLANT_DT: NORMAL
MDC_IDC_LEAD_LOCATION: NORMAL
MDC_IDC_LEAD_LOCATION: NORMAL
MDC_IDC_LEAD_LOCATION_DETAIL_1: NORMAL
MDC_IDC_LEAD_LOCATION_DETAIL_1: NORMAL
MDC_IDC_LEAD_MFG: NORMAL
MDC_IDC_LEAD_MFG: NORMAL
MDC_IDC_LEAD_MODEL: NORMAL
MDC_IDC_LEAD_MODEL: NORMAL
MDC_IDC_LEAD_POLARITY_TYPE: NORMAL
MDC_IDC_LEAD_POLARITY_TYPE: NORMAL
MDC_IDC_LEAD_SERIAL: NORMAL
MDC_IDC_LEAD_SERIAL: NORMAL
MDC_IDC_LEAD_SPECIAL_FUNCTION: NORMAL
MDC_IDC_LEAD_SPECIAL_FUNCTION: NORMAL
MDC_IDC_MSMT_BATTERY_REMAINING_LONGEVITY: 144 MO
MDC_IDC_MSMT_BATTERY_STATUS: NORMAL
MDC_IDC_MSMT_CAP_CHARGE_DTM: NORMAL
MDC_IDC_MSMT_CAP_CHARGE_ENERGY: 41 J
MDC_IDC_MSMT_CAP_CHARGE_TIME: 8.39
MDC_IDC_MSMT_CAP_CHARGE_TIME: 9.44 S
MDC_IDC_MSMT_CAP_CHARGE_TYPE: NORMAL
MDC_IDC_MSMT_CAP_CHARGE_TYPE: NORMAL
MDC_IDC_MSMT_LEADCHNL_RV_IMPEDANCE_VALUE: 934 OHM
MDC_IDC_MSMT_LEADCHNL_RV_PACING_THRESHOLD_AMPLITUDE: 0.8 V
MDC_IDC_MSMT_LEADCHNL_RV_PACING_THRESHOLD_PULSEWIDTH: 1 MS
MDC_IDC_MSMT_LEADCHNL_RV_SENSING_INTR_AMPL: 17.4 MV
MDC_IDC_PG_IMPLANT_DTM: NORMAL
MDC_IDC_PG_MFG: NORMAL
MDC_IDC_PG_MODEL: NORMAL
MDC_IDC_PG_SERIAL: NORMAL
MDC_IDC_PG_TYPE: NORMAL
MDC_IDC_SESS_CLINIC_NAME: NORMAL
MDC_IDC_SESS_DTM: NORMAL
MDC_IDC_SESS_TYPE: NORMAL
MDC_IDC_SET_BRADY_HYSTRATE: NORMAL
MDC_IDC_SET_BRADY_LOWRATE: 40 {BEATS}/MIN
MDC_IDC_SET_BRADY_MODE: NORMAL
MDC_IDC_SET_LEADCHNL_RV_PACING_AMPLITUDE: 2.2 V
MDC_IDC_SET_LEADCHNL_RV_PACING_ANODE_ELECTRODE_1: NORMAL
MDC_IDC_SET_LEADCHNL_RV_PACING_ANODE_LOCATION_1: NORMAL
MDC_IDC_SET_LEADCHNL_RV_PACING_CAPTURE_MODE: NORMAL
MDC_IDC_SET_LEADCHNL_RV_PACING_CATHODE_ELECTRODE_1: NORMAL
MDC_IDC_SET_LEADCHNL_RV_PACING_CATHODE_LOCATION_1: NORMAL
MDC_IDC_SET_LEADCHNL_RV_PACING_POLARITY: NORMAL
MDC_IDC_SET_LEADCHNL_RV_PACING_PULSEWIDTH: 1 MS
MDC_IDC_SET_LEADCHNL_RV_SENSING_ADAPTATION_MODE: NORMAL
MDC_IDC_SET_LEADCHNL_RV_SENSING_ANODE_ELECTRODE_1: NORMAL
MDC_IDC_SET_LEADCHNL_RV_SENSING_ANODE_LOCATION_1: NORMAL
MDC_IDC_SET_LEADCHNL_RV_SENSING_CATHODE_ELECTRODE_1: NORMAL
MDC_IDC_SET_LEADCHNL_RV_SENSING_CATHODE_LOCATION_1: NORMAL
MDC_IDC_SET_LEADCHNL_RV_SENSING_POLARITY: NORMAL
MDC_IDC_SET_LEADCHNL_RV_SENSING_SENSITIVITY: 0.6 MV
MDC_IDC_SET_ZONE_DETECTION_INTERVAL: 300 MS
MDC_IDC_SET_ZONE_DETECTION_INTERVAL: 375 MS
MDC_IDC_SET_ZONE_TYPE: NORMAL
MDC_IDC_SET_ZONE_VENDOR_TYPE: NORMAL
MDC_IDC_STAT_BRADY_RV_PERCENT_PACED: 1 %
MDC_IDC_STAT_EPISODE_RECENT_COUNT: 0
MDC_IDC_STAT_EPISODE_RECENT_COUNT_DTM_END: NORMAL
MDC_IDC_STAT_EPISODE_RECENT_COUNT_DTM_START: NORMAL
MDC_IDC_STAT_EPISODE_TOTAL_COUNT: 0
MDC_IDC_STAT_EPISODE_TOTAL_COUNT: 49
MDC_IDC_STAT_EPISODE_TOTAL_COUNT: 6
MDC_IDC_STAT_EPISODE_TOTAL_COUNT_DTM_END: NORMAL
MDC_IDC_STAT_EPISODE_TYPE: NORMAL
MDC_IDC_STAT_EPISODE_VENDOR_TYPE: NORMAL
MDC_IDC_STAT_TACHYTHERAPY_ATP_DELIVERED_RECENT: 0
MDC_IDC_STAT_TACHYTHERAPY_ATP_DELIVERED_TOTAL: 9
MDC_IDC_STAT_TACHYTHERAPY_RECENT_DTM_END: NORMAL
MDC_IDC_STAT_TACHYTHERAPY_RECENT_DTM_START: NORMAL
MDC_IDC_STAT_TACHYTHERAPY_SHOCKS_ABORTED_RECENT: 0
MDC_IDC_STAT_TACHYTHERAPY_SHOCKS_ABORTED_TOTAL: 0
MDC_IDC_STAT_TACHYTHERAPY_SHOCKS_DELIVERED_RECENT: 0
MDC_IDC_STAT_TACHYTHERAPY_SHOCKS_DELIVERED_TOTAL: 5
MDC_IDC_STAT_TACHYTHERAPY_TOTAL_DTM_END: NORMAL

## 2019-12-09 NOTE — PROGRESS NOTES
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    His LVAD postoperative course was complicated by mild RV dysfunction  Requiring dobutamine but he was successfully  discharged to TCU. He had a mechanical fall with CTH negative for bleed and has been seen at core clinic with low flow alarms that are asymptomatic thought to be related to afterload and volume status. CT was done there is no problem with the LVAD positioning or evidence of outflow graft obstruction/twist.  Pt was also hospitalized with PA catheter placement and hemodynamic monitoring and no hemodynamic, arrhythmogenic or mechanical issues could be identified for the low flow alarms.      Today patient reports that he has been feeling well. Low flow alarms continue, although have decreased somewhat from prior. Remain asymptomatic. He denies any chest pain or pressure, sob/faustin, orthopnea, pnd, palpitations, syncope, gasper, LVAD driveline tenderness or discharge. He denies any bleeding in his stool. If he goes for a long walk, he has some blood in his urine, this is unchanged for him, self-limiting, has been seen by urology and had cystoscopy and no cause has been identified.     He also denies any problems with his medications and reports compliance. However, he has been taking his coreg differently than prescribed because when he took 25mg in the morning and 12.5 mg at night, he felt extremely dizzy and unwell after the 25 mg dose. He started taking 12.5 mg TID and has been feeling well with that method.       PAST MEDICAL HISTORY:  Past Medical History:   Diagnosis Date     Acute kidney injury (H)      CKD (chronic kidney disease)      Coronary artery disease      Diabetes mellitus (H)       HTN (hypertension)      Hyperlipidemia      Ischemic cardiomyopathy      LV (left ventricular) mural thrombus      Paroxysmal atrial flutter (H)      RVF (right ventricular failure) (H)      Systolic heart failure (H)      Ventricular tachycardia (H)        FAMILY HISTORY:  Reviewed    SOCIAL HISTORY:  Social History     Social History     Marital status: Single     Spouse name: N/A     Number of children: N/A     Years of education: N/A     Social History Main Topics     Smoking status: Never Smoker     Smokeless tobacco: Never Used     Alcohol use Not on file     Drug use: Not on file     Sexual activity: Not on file     Other Topics Concern     Not on file     Social History Narrative       CURRENT MEDICATIONS:  Outpatient Medications Prior to Visit   Medication Sig Dispense Refill     aspirin (ASA) 81 MG chewable tablet Take 1 tablet (81 mg) by mouth daily 90 tablet 3     carvedilol (COREG) 12.5 MG tablet Take 25 mg in the am and 12.5 mg in the afternoon 90 tablet 3     digoxin (LANOXIN) 125 MCG tablet Take 1 tablet (125 mcg) by mouth four times a week 90 tablet 3     famotidine (PEPCID) 20 MG tablet Take 1 tablet (20 mg) by mouth 2 times daily 180 tablet 3     hydrALAZINE (APRESOLINE) 50 MG tablet Take 1 tablet (50 mg) by mouth 3 times daily 180 tablet 5     insulin aspart (NOVOLOG PEN) 100 UNIT/ML pen Prandial Dosin units per 7 grams of carbohydrate each Meal or Snack.  Only chart total amount of units given.  Do not give if pre-prandial glucose is less than 60 mg/dL. If given at mealtime, administer within 30 minutes of start of meal. 3 mL 1     insulin glargine (LANTUS SOLOSTAR PEN) 100 UNIT/ML pen Inject 24 Units Subcutaneous At Bedtime 3 mL 11     losartan (COZAAR) 25 MG tablet Take 1 tablet (25 mg) by mouth daily 30 tablet 11     rosuvastatin (CRESTOR) 20 MG tablet Take 1 tablet (20 mg) by mouth At Bedtime 90 tablet 3     warfarin (COUMADIN) 2.5 MG tablet Take two to three tablets daily or as  directed by the Coumadin clinic 150 tablet 5     insulin aspart (NOVOLOG PEN) 100 UNIT/ML pen Inject 1-10 Units Subcutaneous 3 times daily (before meals) Correction Scale - HIGH INSULIN RESISTANCE DOSING     -164 =1 units.   -189 =2.   -214 =3.   -239 =4.   -264 =5.   -289 =6.   -314 =7.   -339 =8.   -364 =9.  BG greater than or equal to 365 give 10 units  To be given with meal insulin, based on pre-meal BG 9 mL 0     insulin aspart (NOVOLOG PEN) 100 UNIT/ML pen Inject 1-7 Units Subcutaneous At Bedtime HIGH INSULIN RESISTANCE DOSING    Bedtime  For  - 224 give 1 units.   For  - 249 give 2 units.   For  - 274 give 3 units.   For  - 299 give 4 units.   For  - 324 give 5 units.   For  - 349 give 6 units.   For BG greater than or equal to 350 give 7 units. (Patient not taking: Reported on 2/11/2019) 2.1 mL 0     nitroGLYcerin (NITROSTAT) 0.4 MG sublingual tablet Place 1 tablet (0.4 mg) under the tongue every 5 minutes as needed for chest pain For chest pain place 1 tablet under the tongue every 5 minutes for 3 doses. If symptoms persist 5 minutes after 1st dose call 911. 10 tablet 0     amiodarone (PACERONE/CODARONE) 200 MG tablet Take 1 tablet (200 mg) by mouth daily 90 tablet 3     hydrALAZINE (APRESOLINE) 50 MG tablet Take 2 tablets (100 mg) by mouth 3 times daily 180 tablet 5     No facility-administered medications prior to visit.        ROS:   CONSTITUTIONAL: Denies fever, chills, fatigue, or weight fluctuations.   HEENT: Denies headache and changes in speech.  CV: Refer to HPI.   PULMONARY:Denies shortness of breath, cough, or previous TB exposure.   GI:Denies nausea, vomiting, diarrhea, and abdominal pain. Bowel movements are regular.   :See HPI  EXT:Denies lower extremity edema.   SKIN:Denies abnormal rashes or lesions.   MUSCULOSKELETAL:Denies upper or lower extremity weakness and pain.   NEUROLOGIC:Denies seizures, or  "upper or lower extremity paresthesia.     EXAM:  BP (!) 70/0 (BP Location: Right arm, Patient Position: Chair, Cuff Size: Adult Regular)   Pulse 60   Temp 98.2  F (36.8  C)   Ht 1.626 m (5' 4\")   Wt 91.3 kg (201 lb 4.8 oz)   SpO2 95%   BMI 34.55 kg/m    GENERAL: Appears alert and oriented times three. Joyful and energetic.  NECK: Supple and without lymphadenopathy. JVD ~6cm.  CV: LVAD Hum  RESPIRATORY: Respirations regular, even, and unlabored. Lungs CTA throughout.   GI: Soft and non distended with normoactive bowel sounds present in all quadrants. No tenderness, rebound, guarding. No organomegaly.    EXTREMITIES: No peripheral edema. .   NEUROLOGIC: Alert and interacting appropriatly. CN II-XII grossly intact. No focal deficits.   MUSCULOSKELETAL: No joint swelling or tenderness.   SKIN: No jaundice. No rashes or lesions.     Labs:  Orders Only on 08/07/2019   Component Date Value Ref Range Status     D Dimer 08/07/2019 0.6* 0.0 - 0.50 ug/ml FEU Final    Comment: This D-dimer assay is intended for use in conjunction with a clinical pretest   probability assessment model to exclude pulmonary embolism (PE) and deep   venous thrombosis (DVT) in outpatients suspected of PE or DVT. The cut-off   value is 0.5 ug/mL FEU.       Lactate Dehydrogenase 08/07/2019 146  85 - 227 U/L Final     INR 08/07/2019 2.71* 0.86 - 1.14 Final     WBC 08/07/2019 6.7  4.0 - 11.0 10e9/L Final     RBC Count 08/07/2019 4.41  4.4 - 5.9 10e12/L Final     Hemoglobin 08/07/2019 13.1* 13.3 - 17.7 g/dL Final     Hematocrit 08/07/2019 39.5* 40.0 - 53.0 % Final     MCV 08/07/2019 90  78 - 100 fl Final     MCH 08/07/2019 29.7  26.5 - 33.0 pg Final     MCHC 08/07/2019 33.2  31.5 - 36.5 g/dL Final     RDW 08/07/2019 14.6  10.0 - 15.0 % Final     Platelet Count 08/07/2019 243  150 - 450 10e9/L Final     Sodium 08/07/2019 138  133 - 144 mmol/L Final     Potassium 08/07/2019 4.0  3.4 - 5.3 mmol/L Final     Chloride 08/07/2019 107  94 - 109 mmol/L " Final     Carbon Dioxide 2019 25  20 - 32 mmol/L Final     Anion Gap 2019 6  3 - 14 mmol/L Final     Glucose 2019 261* 70 - 99 mg/dL Final     Urea Nitrogen 2019 25  7 - 30 mg/dL Final     Creatinine 2019 1.82* 0.66 - 1.25 mg/dL Final     GFR Estimate 2019 40* >60 mL/min/[1.73_m2] Final    Comment: Non  GFR Calc  Starting 2018, serum creatinine based estimated GFR (eGFR) will be   calculated using the Chronic Kidney Disease Epidemiology Collaboration   (CKD-EPI) equation.       GFR Estimate If Black 2019 47* >60 mL/min/[1.73_m2] Final    Comment:  GFR Calc  Starting 2018, serum creatinine based estimated GFR (eGFR) will be   calculated using the Chronic Kidney Disease Epidemiology Collaboration   (CKD-EPI) equation.       Calcium 2019 8.2* 8.5 - 10.1 mg/dL Final     Bilirubin Total 2019 0.8  0.2 - 1.3 mg/dL Final     Albumin 2019 3.5  3.4 - 5.0 g/dL Final     Protein Total 2019 7.1  6.8 - 8.8 g/dL Final     Alkaline Phosphatase 2019 51  40 - 150 U/L Final     ALT 2019 43  0 - 70 U/L Final     AST 2019 34  0 - 45 U/L Final         Diagnostics:  Recent Results (from the past 4320 hour(s))   ECHO COMPLETE WITH OPTISON    Narrative    [id number]  [id number]  [id number]  [id number]^STEFANIA^MANNY^V           [facility]  Echocardiography Laboratory  [address] [place] [id number]     Name: ADILSON RINCON  MRN: [id number]  : 1960  Study Date: 2018 11:27 AM  Age: 57 yrs  Gender: Male  Patient Location: [facility] [place] [place]  Reason For Study: Cardiac Arrest  Ordering Physician: MANNY AGUIAR  Referring Physician: SELF, REFERRED  Performed By: Laya Read RDCS     BSA: 1.8 m2  Height: 64 in  Weight: 170 lb  HR: 78  BP: 99/76 mmHg  _____________________________________________________________________________  __        Procedure  Echocardiogram with  two-dimensional, color and spectral Doppler performed.  Contrast Optison. Optison (NDC #0629-9958-09) given intravenously. Patient was  given 5 ml mixture of 3 ml Optison and 6 ml saline. 4 ml wasted.  _____________________________________________________________________________  __        Interpretation Summary  Left ventricular wall thickness is normal. Borderline left ventricular  dilation is present. LVIDd is 5.19cm The Ejection Fraction is estimated at 20-  25%. The mid to distal anteroseptum, mid to distal anterior wall, mid to  distal anterolateral wall and mid to apical septum and apex are akinetic. This  is consistent with large LAD territory infarct. Laminar thrombus is seen in  the LV apex  The right ventricle is normal size. Difficult to evaluate RV function but it  appears to be mild to moderately reduced.  Moderate tricuspid insufficiency is present. Right ventricular systolic  pressure is 26mmHg above the right atrial pressure.  Dilation of the inferior vena cava is present with normal respiratory  variation in diameter. Estimated mean right atrial pressure is 8 mmHg (mildly  elevated).  No pericardial effusion is present.     Previous study not available for comparison.  _____________________________________________________________________________  __        Left Ventricle  Left ventricular wall thickness is normal. Borderline left ventricular  dilation is present. LVIDd is 5.19cm. Grade III or advanced diastolic  dysfunction. The Ejection Fraction is estimated at 20-25%. The mid to distal  anteroseptum, mid to distal anterior  wall, mid to distal anterolateral wall and mid to apical septum and apex are  akinetic. This is consistent with large LAD territory infarct. Laminar  thrombus is seen in the LV apex.     Right Ventricle  Right ventricular wall thickness is normal. The right ventricle is normal  size. Difficult to evaluate RV function but it appears to be mild to  moderately reduced. A  pacemaker lead is noted in the right ventricle.     Atria  Moderate left atrial enlargement is present. Moderate right atrial enlargement  is present. A pacemaker lead is noted in the right atrium.     Mitral Valve  The mitral valve is normal. Trace mitral insufficiency is present.        Aortic Valve  Aortic valve is normal in structure and function. The aortic valve is  tricuspid.     Tricuspid Valve  The tricuspid valve is normal. Moderate tricuspid insufficiency is present.  The right ventricular systolic pressure is approximated at 26.0 mmHg plus the  right atrial pressure. Right ventricular systolic pressure is 26mmHg above the  right atrial pressure.     Pulmonic Valve  The pulmonic valve is normal. Trace pulmonic insufficiency is present.     Vessels  The aorta root is normal. Dilation of the inferior vena cava is present with  normal respiratory variation in diameter. Estimated mean right atrial pressure  is 8 mmHg (mildly elevated).     Pericardium  No pericardial effusion is present.        Compared to Previous Study  Previous study not available for comparison.  _____________________________________________________________________________  __  MMode/2D Measurements & Calculations  IVSd: 0.74 cm     LVIDd: 5.2 cm  LVIDs: 4.0 cm  LVPWd: 1.0 cm  FS: 23.3 %  LV mass(C)d: 167.6 grams  LV mass(C)dI: 91.8 grams/m2  asc Aorta Diam: 3.6 cm  LVOT diam: 2.1 cm  LVOT area: 3.5 cm2  LA Volume (BP): 81.1 ml  LA Volume Index (BP): 44.3 ml/m2  RWT: 0.40           Doppler Measurements & Calculations  MV E max sasha: 101.0 cm/sec  MV A max sasha: 16.5 cm/sec  MV E/A: 6.1  MV dec slope: 660.0 cm/sec2  PA V2 max: 96.5 cm/sec  PA max PG: 3.7 mmHg  PA acc time: 0.11 sec  TR max sasha: 255.0 cm/sec  TR max P.0 mmHg  E/E' av.8  Lateral E/e': 11.0  Medial E/e': 34.5     _____________________________________________________________________________  __           Report approved jamin: LEÓN Powell 2018 02:05 PM                      [facility] 2/19/18  Right Heart Catheterization:  /95/112  HR 61  BSA 1.91  RA 17/13/11   RV 30/11  PA 30/12/18   PCW 15/13/12   William CO 3.7   William CI 2.0   TD CO 4.6   TD CI 2.5   PA sat 51.8%  PCW sat 95.6%   Hgb 9.1 g/dL   PVR 1.6    C 7/5/19  Pressures Phase: Baseline    Time Systolic Diastolic Mean A Wave V Wave EDP Max dp/dt HR   RA Pressures 12:28 PM   10 mmHg    14 mmHg    12 mmHg      53 bpm      RV Pressures 12:07 PM       288 mmHg/sec        12:29 PM 30 mmHg    8 mmHg       13 mmHg     53 bpm      PA Pressures 12:32 PM 30 mmHg    11 mmHg    18 mmHg        53 bpm      PCW Pressures 12:30 PM   14 mmHg    17 mmHg    17 mmHg      52 bpm      Blood Flow Results Phase: Baseline    Time Results Indexed Values   QP 12:07 PM 3.85 L/min    1.99 L/min/m2      QS 12:07 PM 3.85 L/min    1.99 L/min/m2      Blood Oximetry Phase: Baseline    Time Hb SAT(%) PO2 Content PA Sat   PA 12:07 PM  61.4 %      61.4 %      Art 12:07 PM  97 %     15.7 mL/dL       Cardiac Output Phase: Baseline    Time TDCO TDCI William C.O. Iwlliam C.I. William HR   Cardiac Output Results 12:07 PM   3.85 L/min    1.99 L/min/m2       Resistance Results Phase: Baseline    Time PVR SVR PVR-I SVR-I TPR TVR TPR-I TVR-I PVR/SVR TPR/TVR   Resistance Results (Metric) 12:07 PM 83.09 dsc-5     161.14 dsc-5/m2     373.91 dsc-5     725.13 dsc-5/m2         Resistance Results (Wood) 12:07 PM 1.04 LERMA     2.01 LERMA/m2     4.68 LERMA     9.07 LERMA/m2             Echo 7/7/19  Interpretation Summary  HM 3 at 5300RPM.  Severely (EF 20-30%) reduced left ventricular function is present. LVIDd:4.6  cm. Septum midline.  Left ventricular size is normal.  Global right ventricular function is mildly reduced.  Moderate right ventricular dilation is present.  AV is not seen well, No AI.  Inflow velocity cannot be assessed. Outflow velocity is normal.  The inferior vena cava was normal in size with preserved respiratory  variability.  No pericardial effusion is present.  This  study was compared with the study from 6/26/2019 .There has been no  change including RV function.    ICD interrogation:  Patient seen in clinic for evaluation and iterative programming of his [employer] single lead ICD per MD orders. Patient has an appointment to see Dr. Ortiz today. Normal ICD function. No arrhythmias recorded. Intrinsic rhythm = regular ventricular rhythm @ 50 bpm.  = <1%. Estimated battery longevity to QUIQUE = 12 years. Lead trends appear stable. Patient reports that he is feeling well and denies complaints. Plan for patient to return to clinic in 3 months. ALEX Arroyo RN     Single lead ICD    LVAD interrogation:   HM 3:   Flow: 3.7 L/min,    Speed: 5300 RPMs,     PI: 5.5 ,  Power: 4.0 Parker, Hct: 20 (set to lowest level to decrease low flow alarms)   2). ALARMS  Alarms reported by patient since last pump evaluation: Yes- Low Flows  Alarms or other finding noted during pump data history and alarm download: Yes- many PI events with occasional speed drops. 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    He continues to have asymptomatic low flow alarms, although somewhat decreased from prior, he has had an extensive negative work-up as above.     He had a lot of dizziness with 25 mg of coreg and has had to be  his dosing. I think he has likely been hypotensive at home and so we will decrease his afterload today. Otherwise he has been stable, appears euvolemic today, labs are stable with the exception of low sodium.    Chronic systolic heart failure secondary to ICM s/p HM3 12/31/18  Stage C, NYHA Class III  ACEi/ARB: contionue losartan 25 mg at bedtime, no changes given renal  dysfunction  VASODILATORS:  Decrease hydralazine to 50 mg q8h  BB  Continue coreg, if he is feeling okay in 3-4 days, will try taking coreg 25/12 (rather than 12.5 TID), if he is not dizzy with this dosing, we will consdier increasing coreg to 25 mg BID in a week or two  SCD prophylaxis ICD.  Given persistent LVAD alarms with low flows.  Have decreased in frequency.  Prior hospitalization with PA catheter placement and hemodynamic monitoring and no hemodynamic, arrhythmogenic or mechanical issues could be identified for the low flow alarms.  Symptoms with these events have greatly improved/resolved. We will look into getting him a new controller with a lower low flow limit.  LDH: stable at 156  MAP: 70, antihypertensive changes as above  Antiplatelet: ASA 81 mg  Anticoagulation: coumadin with INR goal of 2-3      Hyponatremia. 130 today. Will recheck in one week to ensure this is improving.      CAD. s/p STEMI with ALYSSA to LAD 6/27/18  - Continue Crestor and BB    PAF. CHADSVASC-4. No arrhythmias on recent ICD checks  - Coumadin per AC.   - Continue BB  - STOP amiodarone    VT/VF. With cardiac arrest.   - No recent ICD shocks  - STOP amiodarone    LV thrombus.   - Coumadin as above.     CKD Stage III.   - Cr slightly elevated today.  Exam c/w with mild hypovolemia.  Encouraged increase in po intake.      Follow-up:  Phone call in two weeks. Recheck sodium in 1 week. F/u with NP in 3 months and with me in 6 months with device check and labs with both appointments.      This patient was seen as part of a shared visit with Dr. Ortiz.    Betsey Lindsay PA-C  [facility]        I have seen and examined the patient as part of a shared visit with TATYANA Shipman.  I agree with the note above. I reviewed today's vital signs and medications. I have reviewed and discussed with the advanced practice provider their physical examination, assessment, and plan   Briefly, pt with ischemic cardiomyopathy s/p LVAD  My  key history/exam findings are: symptomatically hypotensive  Grossly euvolemic on exam .  The key management decisions made by me: decrease hydralazine to 50mg tid.  Stop amiodarone.      Jenni Ortiz MD  Section Head - Advanced Heart Failure, Transplantation and Mechanical Circulatory Support  Director - Adult Congenital and Cardiovascular [facility]etics [facility]  Associate Professor of Medicine, [facility]

## 2019-12-10 ENCOUNTER — CARE COORDINATION (OUTPATIENT)
Dept: CARDIOLOGY | Facility: CLINIC | Age: 59
End: 2019-12-10

## 2019-12-10 NOTE — PROGRESS NOTES
The writer followed up with patient via telephone regarding last week's appointment and medication changes. Last week, patient was instructed to change his Hydralazine dose from 100mg TID to 50mg TID, and reports no issues today. Patient was also instructed to change his dose of Coreg from 12.5mg TID to 25mg/12.5mg, and denies any dizziness or symptoms from these medication changes. Patient continues to report occasional Low Flow alarms without symptoms. The low flow limit setting will be addressed at the next clinic appointment. Patient instructed to call VAD Coordinator with any concerns or new alarms.

## 2019-12-11 ENCOUNTER — ANTICOAGULATION THERAPY VISIT (OUTPATIENT)
Dept: ANTICOAGULATION | Facility: CLINIC | Age: 59
End: 2019-12-11

## 2019-12-11 DIAGNOSIS — Z95.811 LVAD (LEFT VENTRICULAR ASSIST DEVICE) PRESENT (H): ICD-10-CM

## 2019-12-11 DIAGNOSIS — I50.9 HEART FAILURE (H): ICD-10-CM

## 2019-12-11 LAB — INR PPP: 3.8

## 2019-12-11 NOTE — PROGRESS NOTES
ANTICOAGULATION FOLLOW-UP CLINIC VISIT    Patient Name:  Mariusz Sharp  Date:  12/11/2019  Contact Type:  Telephone    SUBJECTIVE:  Patient Findings     Comments:   One time dose adjustment today only.  No apparent reason for the higher INR today         Clinical Outcomes     Comments:   One time dose adjustment today only.  No apparent reason for the higher INR today            OBJECTIVE    INR   Date Value Ref Range Status   12/11/2019 3.8  Final     Chromogenic Factor 10   Date Value Ref Range Status   01/11/2019 72 70 - 130 % Final     Comment:     Therapeutic Range:  A Chromogenic Factor 10 level of approximately 20-40%   inversely correlates with an INR of 2-3 for patients receiving Warfarin.   Chromogenic Factor 10 levels below 20% indicate an INR greater than 3 and   levels above 40% indicate an INR less than 2.         ASSESSMENT / PLAN  INR assessment SUPRA    Recheck INR In: 1 WEEK    INR Location Outside lab      Anticoagulation Summary  As of 12/11/2019    INR goal:   2.0-3.0   TTR:   73.5 % (10.6 mo)   INR used for dosing:   3.8! (12/11/2019)   Warfarin maintenance plan:   5 mg (2.5 mg x 2) every day   Full warfarin instructions:   12/11: 2.5 mg; Otherwise 5 mg every day   Weekly warfarin total:   35 mg   Plan last modified:   Sophy Griggs RN (11/1/2019)   Next INR check:   12/18/2019   Priority:   Critical   Target end date:   Indefinite    Indications    Heart failure (H) [I50.9]  LVAD (left ventricular assist device) present (H) [Z95.811]             Anticoagulation Episode Summary     INR check location:       Preferred lab:       Send INR reminders to:   [facility]    Comments:   LVAD implanted 12/31/18  3  ASA 81mg Daily Has 2.5mg tablets   [place] lab bjh=[phone].  Faxed S.O there on 2/25/2019 7/9/19 Follow INR's only no Chromogenic X's      Anticoagulation Care Providers     Provider Role Specialty Phone number    Jenni Ortiz MD Responsible Cardiology  [phone]            See the Encounter Report to view Anticoagulation Flowsheet and Dosing Calendar (Go to Encounters tab in chart review, and find the Anticoagulation Therapy Visit)    Spoke with patient. Gave them their lab results and new warfarin recommendation.  No changes in health, medication, or diet. No missed doses, no falls. No signs or symptoms of bleed or clotting.      Bredna Alford RN

## 2019-12-12 LAB
ANION GAP SERPL CALCULATED.3IONS-SCNC: 8 MMOL/L
BUN SERPL-MCNC: 37 MG/DL
CALCIUM SERPL-MCNC: 8.3 MG/DL
CHLORIDE SERPLBLD-SCNC: 109 MMOL/L
CO2 SERPL-SCNC: 24 MMOL/L
CREAT SERPL-MCNC: 1.66 MG/DL
GFR SERPL CREATININE-BSD FRML MDRD: 43 ML/MIN/1.73M2
GLUCOSE SERPL-MCNC: 180 MG/DL (ref 70–99)
POTASSIUM SERPL-SCNC: 5 MMOL/L
SODIUM SERPL-SCNC: 141 MMOL/L

## 2019-12-18 ENCOUNTER — ANTICOAGULATION THERAPY VISIT (OUTPATIENT)
Dept: ANTICOAGULATION | Facility: CLINIC | Age: 59
End: 2019-12-18

## 2019-12-18 DIAGNOSIS — I50.9 HEART FAILURE (H): ICD-10-CM

## 2019-12-18 DIAGNOSIS — Z95.811 LVAD (LEFT VENTRICULAR ASSIST DEVICE) PRESENT (H): ICD-10-CM

## 2019-12-18 LAB — INR PPP: 2.5 (ref 0.9–1.1)

## 2019-12-18 NOTE — PROGRESS NOTES
ANTICOAGULATION FOLLOW-UP CLINIC VISIT    Patient Name:  Mariusz Sharp  Date:  2019  Contact Type:  Telephone    SUBJECTIVE:  Patient Findings     Comments:   [facility] [place] calls in with the INR result today         Clinical Outcomes     Comments:   [facility] in [place] calls in with the INR result today            OBJECTIVE    INR   Date Value Ref Range Status   2019 2.5 (A) 0.90 - 1.10 Final     Chromogenic Factor 10   Date Value Ref Range Status   2019 72 70 - 130 % Final     Comment:     Therapeutic Range:  A Chromogenic Factor 10 level of approximately 20-40%   inversely correlates with an INR of 2-3 for patients receiving Warfarin.   Chromogenic Factor 10 levels below 20% indicate an INR greater than 3 and   levels above 40% indicate an INR less than 2.         ASSESSMENT / PLAN  INR assessment THER    Recheck INR In: 8 DAYS    INR Location Outside lab      Anticoagulation Summary  As of 2019    INR goal:   2.0-3.0   TTR:   72.8 % (10.9 mo)   INR used for dosin.5 (2019)   Warfarin maintenance plan:   5 mg (2.5 mg x 2) every day   Full warfarin instructions:   5 mg every day   Weekly warfarin total:   35 mg   No change documented:   Brenda Alford, RN   Plan last modified:   Sophy Griggs RN (2019)   Next INR check:   2019   Priority:   Critical   Target end date:   Indefinite    Indications    Heart failure (H) [I50.9]  LVAD (left ventricular assist device) present (H) [Z95.811]             Anticoagulation Episode Summary     INR check location:       Preferred lab:       Send INR reminders to:    [facility]    Comments:   LVAD implanted 18 HM 3  ASA 81mg Daily Has 2.5mg tablets   [place] lab ytd=[phone].  Faxed S.O there on 2019 Follow INR's only no Chromogenic X's      Anticoagulation Care Providers     Provider Role Specialty Phone number    Jenni Ortiz MD [facility] Cardiology [phone]            See the  Encounter Report to view Anticoagulation Flowsheet and Dosing Calendar (Go to Encounters tab in chart review, and find the Anticoagulation Therapy Visit)    Spoke with patient. Gave them their lab results and new warfarin recommendation.  No changes in health, medication, or diet. No missed doses, no falls. No signs or symptoms of bleed or clotting.      Brenda Alford RN

## 2019-12-20 ENCOUNTER — CARE COORDINATION (OUTPATIENT)
Dept: CARDIOLOGY | Facility: CLINIC | Age: 59
End: 2019-12-20

## 2019-12-20 NOTE — PROGRESS NOTES
Writer called to notify patient of the [employer] Mobile Power Unit (MPU) and electrostatic discharge (ESD) urgent recall/advisory. Writer discussed the below, as it relates to the recall.    There is a risk for power loss and pump stop if patient is exposed to ESD while using the MPU    The company reported this occurrence at a rate of 0.2%. There were two reports of serious injury and zero reports of death.     Neither the [facility] VAD leadership team, nor the  ([employer]) recommend replacement of the MPU at this time. No  fix  for the device is needed.     Situations which may lead to this problem:  o Folding or changing bedsheets  o Taking laundry out of the dryer  o Dragging feet on the carpet  o Touching screens on older TVs or older computer (LCD and LED screens are less likely to cause static electricity)    o Low humidity and dry weather  o Vacuuming  o Any other activity that may lead to a buildup of static electricity     Signs electrostatic discharge has caused problems:  o An emergency, continuous tone alarm  o The green light on the MPU is not lit  o The controller message will show a  No External Power  alarm  o The pump may or may not be running    What steps the patient should take if experiencing an ESD event:  o Immediately change to battery power, which should resolve alarms and restart pump if stopped  o Verify alarms have resolved and the pump is running by looking at controller  o Page the VAD Coordinator on-call, who will advise patient on next steps and replace equipment prn  o Patient should not use the effected MPU - we will send a replacement  o Reinforce with patient that they should NOT change controller    Steps patient should take to avoid ESD  o Only use the MPU when sleeping   o Avoid previously discussed high risk situations for static discharge  o Remain on battery power if engaging in behaviors with a risk of static exposure  o Utilize a humidifier, dryer sheets,  lotion, and anti-static spray. Avoid wool, silk, and materials that are prone to static electricity buildup.  Patient verbalized understanding of above education. They will contact the VAD team if they have any questions or concerns. Patient notified that they will receive a letter reiterating this information as well.

## 2019-12-26 ENCOUNTER — ANTICOAGULATION THERAPY VISIT (OUTPATIENT)
Dept: ANTICOAGULATION | Facility: CLINIC | Age: 59
End: 2019-12-26

## 2019-12-26 DIAGNOSIS — I50.9 HEART FAILURE (H): ICD-10-CM

## 2019-12-26 DIAGNOSIS — Z95.811 LVAD (LEFT VENTRICULAR ASSIST DEVICE) PRESENT (H): ICD-10-CM

## 2019-12-26 LAB — INR PPP: 1.9 (ref 0.9–1.1)

## 2019-12-26 NOTE — PROGRESS NOTES
19 Spoke to Katlyn at patient's current lab.  The clinic will be closing permanently on 1/10/20.  Next lab draw will be a current lab, then Mariusz stated he has a new lab that he will be going to.  He will update the [facility] with contact information in the future.    Sandor Brito RN  ANTICOAGULATION FOLLOW-UP CLINIC VISIT    Patient Name:  Mariusz Sharp  Date:  2019  Contact Type:  Telephone    SUBJECTIVE:  Patient Findings     Comments:   Will need to update comment section with New lab.  Clinic is closing.  20 is the final lab draw at current clinic per patient.        Clinical Outcomes     Comments:   Will need to update comment section with New lab.  Clinic is closing.  20 is the final lab draw at current clinic per patient.           OBJECTIVE    INR   Date Value Ref Range Status   2019 1.9 (A) 0.90 - 1.10 Final     Comment:     Outside lab     Chromogenic Factor 10   Date Value Ref Range Status   2019 72 70 - 130 % Final     Comment:     Therapeutic Range:  A Chromogenic Factor 10 level of approximately 20-40%   inversely correlates with an INR of 2-3 for patients receiving Warfarin.   Chromogenic Factor 10 levels below 20% indicate an INR greater than 3 and   levels above 40% indicate an INR less than 2.         ASSESSMENT / PLAN  INR assessment SUB    Recheck INR In: 1 WEEK    INR Location Outside lab      Anticoagulation Summary  As of 2019    INR goal:   2.0-3.0   TTR:   73.0 % (11.1 mo)   INR used for dosin.9! (2019)   Warfarin maintenance plan:   5 mg (2.5 mg x 2) every day   Full warfarin instructions:   : 6.25 mg; Otherwise 5 mg every day   Weekly warfarin total:   35 mg   Plan last modified:   Sophy Griggs RN (2019)   Next INR check:   2020   Priority:   Critical   Target end date:   Indefinite    Indications    Heart failure (H) [I50.9]  LVAD (left ventricular assist device) present (H) [Z95.811]             Anticoagulation Episode  Summary     INR check location:       Preferred lab:       Send INR reminders to:   [facility]    Comments:   LVAD implanted 12/31/18 HM 3  ASA 81mg Daily Has 2.5mg tablets   [place] lab bzh=[phone].  Faxed S.O there on 2/25/2019 7/9/19 Follow INR's only no Chromogenic X's      Anticoagulation Care Providers     Provider Role Specialty Phone number    Jenni Ortiz MD Responsible Cardiology [phone]            See the Encounter Report to view Anticoagulation Flowsheet and Dosing Calendar (Go to Encounters tab in chart review, and find the Anticoagulation Therapy Visit)    INR/CFX/F2 RESULT: INR result is 1.9    ASSESSMENT: No Problems found. No Changes in Health, Medications, or diet. No Signs of bruising, bleeding or clotting.    DOSING ADJUSTMENT: Recommended 6.25mg one time today, then resume 5mg daily.    NEXT INR/FACTOR X OR FACTOR II: 1/2/20    PROTOCOL FOLLOWED:LVAD goal 2-3  Patient had LVAD placed on:   12/31/18  Type of LVAD: HM 3  Patient's current Aspirin dose:  81mg ASA  LVAD Protocol followed:  Yes.   If Not Followed Explanation:  Sandor Barrera, RN

## 2020-01-02 ENCOUNTER — ANTICOAGULATION THERAPY VISIT (OUTPATIENT)
Dept: ANTICOAGULATION | Facility: CLINIC | Age: 60
End: 2020-01-02

## 2020-01-02 DIAGNOSIS — Z95.811 LVAD (LEFT VENTRICULAR ASSIST DEVICE) PRESENT (H): ICD-10-CM

## 2020-01-02 DIAGNOSIS — I50.9 HEART FAILURE (H): ICD-10-CM

## 2020-01-02 LAB — INR PPP: 2.1 (ref 0.9–1.1)

## 2020-01-02 NOTE — PROGRESS NOTES
ANTICOAGULATION FOLLOW-UP CLINIC VISIT    Patient Name:  Mariusz Sharp  Date:  2020  Contact Type:  Telephone    SUBJECTIVE:  Patient Findings     Comments:   Patient reports that current standing INR order will be sent to new lab.         Clinical Outcomes     Comments:   Patient reports that current standing INR order will be sent to new lab.            OBJECTIVE    INR   Date Value Ref Range Status   2020 2.1 (A) 0.90 - 1.10 Final     Chromogenic Factor 10   Date Value Ref Range Status   2019 72 70 - 130 % Final     Comment:     Therapeutic Range:  A Chromogenic Factor 10 level of approximately 20-40%   inversely correlates with an INR of 2-3 for patients receiving Warfarin.   Chromogenic Factor 10 levels below 20% indicate an INR greater than 3 and   levels above 40% indicate an INR less than 2.         ASSESSMENT / PLAN  No question data found.  Anticoagulation Summary  As of 2020    INR goal:   2.0-3.0   TTR:   72.6 % (11.4 mo)   INR used for dosin.1 (2020)   Warfarin maintenance plan:   5 mg (2.5 mg x 2) every day   Full warfarin instructions:   5 mg every day   Weekly warfarin total:   35 mg   Plan last modified:   Sophy Griggs RN (2019)   Next INR check:   2020   Priority:   Critical   Target end date:   Indefinite    Indications    Heart failure (H) [I50.9]  LVAD (left ventricular assist device) present (H) [Z95.811]             Anticoagulation Episode Summary     INR check location:       Preferred lab:       Send INR reminders to:    [facility]    Comments:   LVAD implanted 18  3  ASA 81mg Daily Has 2.5mg tablets   [place] lab xwz=[phone].  Faxed S.O there on 2019 Follow INR's only no Chromogenic X's      Anticoagulation Care Providers     Provider Role Specialty Phone number    Jenni Ortiz MD [facility] Cardiology [phone]            See the Encounter Report to view Anticoagulation Flowsheet and Dosing Calendar  (Go to Encounters tab in chart review, and find the Anticoagulation Therapy Visit)    Spoke with patient.     Sophy Griggs RN

## 2020-01-04 DIAGNOSIS — I10 ESSENTIAL HYPERTENSION: ICD-10-CM

## 2020-01-06 ENCOUNTER — CARE COORDINATION (OUTPATIENT)
Dept: CARDIOLOGY | Facility: CLINIC | Age: 60
End: 2020-01-06

## 2020-01-07 ENCOUNTER — CARE COORDINATION (OUTPATIENT)
Dept: CARDIOLOGY | Facility: CLINIC | Age: 60
End: 2020-01-07

## 2020-01-07 RX ORDER — CARVEDILOL 12.5 MG/1
TABLET ORAL
Qty: 180 TABLET | Refills: 5 | Status: SHIPPED | OUTPATIENT
Start: 2020-01-07 | End: 2020-02-27

## 2020-01-09 ENCOUNTER — ANTICOAGULATION THERAPY VISIT (OUTPATIENT)
Dept: ANTICOAGULATION | Facility: CLINIC | Age: 60
End: 2020-01-09

## 2020-01-09 DIAGNOSIS — Z95.811 LVAD (LEFT VENTRICULAR ASSIST DEVICE) PRESENT (H): ICD-10-CM

## 2020-01-09 DIAGNOSIS — I50.9 HEART FAILURE (H): ICD-10-CM

## 2020-01-09 LAB
ALBUMIN SERPL-MCNC: 4.1 G/DL
ALP SERPL-CCNC: 51 U/L
ALT SERPL-CCNC: 34 U/L
ANION GAP SERPL CALCULATED.3IONS-SCNC: 11 MMOL/L
AST SERPL-CCNC: 28 U/L
BILIRUB SERPL-MCNC: 0.7 MG/DL
BNP SERPL-MCNC: 561 PG/ML
BUN SERPL-MCNC: 29 MG/DL
CALCIUM SERPL-MCNC: 8.6 MG/DL
CHLORIDE SERPLBLD-SCNC: 109 MMOL/L
CO2 SERPL-SCNC: 23 MMOL/L
CREAT SERPL-MCNC: 1.34 MG/DL
GFR SERPL CREATININE-BSD FRML MDRD: 55 ML/MIN/1.73M2
GLUCOSE SERPL-MCNC: 193 MG/DL (ref 70–99)
INR PPP: 2.2 (ref 0.9–1.1)
LDH SERPL-CCNC: 148 IU/L
POTASSIUM SERPL-SCNC: 4.6 MMOL/L
PROT SERPL-MCNC: 6.1 G/DL
SODIUM SERPL-SCNC: 143 MMOL/L

## 2020-01-09 NOTE — PROGRESS NOTES
ANTICOAGULATION FOLLOW-UP CLINIC VISIT    Patient Name:  Mariusz Sharp  Date:  2020  Contact Type:  Telephone    SUBJECTIVE:         OBJECTIVE    INR   Date Value Ref Range Status   2020 2.2 (A) 0.90 - 1.10 Final     Comment:     Outside lab     Chromogenic Factor 10   Date Value Ref Range Status   2019 72 70 - 130 % Final     Comment:     Therapeutic Range:  A Chromogenic Factor 10 level of approximately 20-40%   inversely correlates with an INR of 2-3 for patients receiving Warfarin.   Chromogenic Factor 10 levels below 20% indicate an INR greater than 3 and   levels above 40% indicate an INR less than 2.         ASSESSMENT / PLAN  INR assessment THER    Recheck INR In: 1 WEEK    INR Location Outside lab      Anticoagulation Summary  As of 2020    INR goal:   2.0-3.0   TTR:   73.1 % (11.6 mo)   INR used for dosin.2 (2020)   Warfarin maintenance plan:   5 mg (2.5 mg x 2) every day   Full warfarin instructions:   5 mg every day   Weekly warfarin total:   35 mg   No change documented:   Sandor Sanchez RN   Plan last modified:   Sophy Griggs RN (2019)   Next INR check:   2020   Priority:   Critical   Target end date:   Indefinite    Indications    Heart failure (H) [I50.9]  LVAD (left ventricular assist device) present (H) [Z95.811]             Anticoagulation Episode Summary     INR check location:       Preferred lab:       Send INR reminders to:   [facility]    Comments:   LVAD implanted 18 HM 3  ASA 81mg Daily Has 2.5mg tablets   [place] lab ven=[phone].  Faxed S.O there on 2019 Follow INR's only no Chromogenic X's      Anticoagulation Care Providers     Provider Role Specialty Phone number    Jenni Ortiz MD Responsible Cardiology [phone]            See the Encounter Report to view Anticoagulation Flowsheet and Dosing Calendar (Go to Encounters tab in chart review, and find the Anticoagulation Therapy  Visit)    INR/CFX/F2 RESULT: INR result is 2.2 per outside lab    ASSESSMENT:Spoke with patient. Gave them their lab results and new warfarin recommendation.  No changes in health, medication, or diet. No missed doses, no falls. No signs or symptoms of bleed or clotting.    DOSING ADJUSTMENT: Continue 5mg daily    NEXT INR/FACTOR X OR FACTOR II: 1/16    PROTOCOL FOLLOWED:LVAD 2-3  Patient had LVAD placed on:   12/31/18  Type of LVAD:  HM 3   Patient's current Aspirin dose: 81mg   LVAD Protocol followed:  Yes.   If Not Followed Explanation:   Sandor Zaman, RN

## 2020-01-15 ENCOUNTER — CARE COORDINATION (OUTPATIENT)
Dept: CARDIOLOGY | Facility: CLINIC | Age: 60
End: 2020-01-15

## 2020-01-15 NOTE — PROGRESS NOTES
VAD Coordinator followed up with patient via telephone to discuss medication changes based off of his most recent lab values. Patient was instructed to continue taking his coreg 25mg in the morning and 12.5mg in the evening. Patient was also instructed to continue taking hydralazine 50mg TID, and losartan 25mg every day at bedtime. Patient verbalized understanding with medications, and will call the VAD coordinator if he experiences any dizziness or alarms.    Patient was also asked to get his blood pressure checked at his local clinic the next time our MD was down there. Coordinator will follow up with patient when we have a date. Patient verbalized understanding.

## 2020-01-16 ENCOUNTER — ANTICOAGULATION THERAPY VISIT (OUTPATIENT)
Dept: ANTICOAGULATION | Facility: CLINIC | Age: 60
End: 2020-01-16

## 2020-01-16 DIAGNOSIS — Z95.811 LVAD (LEFT VENTRICULAR ASSIST DEVICE) PRESENT (H): ICD-10-CM

## 2020-01-16 DIAGNOSIS — I50.9 HEART FAILURE (H): ICD-10-CM

## 2020-01-16 LAB — INR PPP: 2.1 (ref 0.9–1.1)

## 2020-01-16 NOTE — PROGRESS NOTES
ANTICOAGULATION FOLLOW-UP CLINIC VISIT    Patient Name:  Mariusz Sharp  Date:  2020  Contact Type:  Telephone    SUBJECTIVE:  Patient Findings         Clinical Outcomes     Negatives:   Major bleeding event, Thromboembolic event, Anticoagulation-related hospital admission, Anticoagulation-related ED visit, Anticoagulation-related fatality           OBJECTIVE    INR   Date Value Ref Range Status   2020 2.1 (A) 0.90 - 1.10 Final     Comment:     Outside Lab     Chromogenic Factor 10   Date Value Ref Range Status   2019 72 70 - 130 % Final     Comment:     Therapeutic Range:  A Chromogenic Factor 10 level of approximately 20-40%   inversely correlates with an INR of 2-3 for patients receiving Warfarin.   Chromogenic Factor 10 levels below 20% indicate an INR greater than 3 and   levels above 40% indicate an INR less than 2.         ASSESSMENT / PLAN  INR assessment THER    Recheck INR In: 2 WEEKS    INR Location Outside lab      Anticoagulation Summary  As of 2020    INR goal:   2.0-3.0   TTR:   73.7 % (11.8 mo)   INR used for dosin.1 (2020)   Warfarin maintenance plan:   5 mg (2.5 mg x 2) every day   Full warfarin instructions:   5 mg every day   Weekly warfarin total:   35 mg   Plan last modified:   Sophy Griggs RN (2019)   Next INR check:   2020   Priority:   Critical   Target end date:   Indefinite    Indications    Heart failure (H) [I50.9]  LVAD (left ventricular assist device) present (H) [Z95.811]             Anticoagulation Episode Summary     INR check location:       Preferred lab:       Send INR reminders to:   [facility]    Comments:   LVAD implanted 18  3  ASA 81mg Daily Has 2.5mg tablets   [place] lab hbs=[phone].  Faxed S.O there on 2019 Follow INR's only no Chromogenic X's      Anticoagulation Care Providers     Provider Role Specialty Phone number    Jenni Otriz MD [facility] Cardiology [phone]             See the Encounter Report to view Anticoagulation Flowsheet and Dosing Calendar (Go to Encounters tab in chart review, and find the Anticoagulation Therapy Visit)    Spoke with patient. Gave them their lab results and new warfarin recommendation.  No changes in health, medication, or diet. No missed doses, no falls. No signs or symptoms of bleed or clotting.     Patient had LVAD placed on:   12/31/18  Type of LVAD: HM 3  Patient's current Aspirin dose: ASA 81mg Daily  LVAD Protocol followed: Yes   If Not Followed Explanation:  N/A    Chasity Gonzalez RN

## 2020-01-30 ENCOUNTER — ANTICOAGULATION THERAPY VISIT (OUTPATIENT)
Dept: ANTICOAGULATION | Facility: CLINIC | Age: 60
End: 2020-01-30

## 2020-01-30 DIAGNOSIS — Z95.811 LVAD (LEFT VENTRICULAR ASSIST DEVICE) PRESENT (H): ICD-10-CM

## 2020-01-30 DIAGNOSIS — I50.9 HEART FAILURE (H): ICD-10-CM

## 2020-01-30 LAB — INR PPP: 3.1 (ref 0.9–1.1)

## 2020-01-30 NOTE — PROGRESS NOTES
ANTICOAGULATION FOLLOW-UP CLINIC VISIT    Patient Name:  Mariusz Sharp  Date:  1/30/2020  Contact Type:  Telephone    SUBJECTIVE:  Patient Findings     Comments:   Patient will have a serving of vitamin K today.  No reason for elevated INR today.         Clinical Outcomes     Comments:   Patient will have a serving of vitamin K today.  No reason for elevated INR today.            OBJECTIVE    INR   Date Value Ref Range Status   01/30/2020 3.1 (A) 0.90 - 1.10 Final     Chromogenic Factor 10   Date Value Ref Range Status   01/11/2019 72 70 - 130 % Final     Comment:     Therapeutic Range:  A Chromogenic Factor 10 level of approximately 20-40%   inversely correlates with an INR of 2-3 for patients receiving Warfarin.   Chromogenic Factor 10 levels below 20% indicate an INR greater than 3 and   levels above 40% indicate an INR less than 2.         ASSESSMENT / PLAN  No question data found.  Anticoagulation Summary  As of 1/30/2020    INR goal:   2.0-3.0   TTR:   74.9 % (1 y)   INR used for dosing:   3.1! (1/30/2020)   Warfarin maintenance plan:   5 mg (2.5 mg x 2) every day   Full warfarin instructions:   5 mg every day   Weekly warfarin total:   35 mg   Plan last modified:   Sophy Griggs RN (11/1/2019)   Next INR check:   2/6/2020   Priority:   Critical   Target end date:   Indefinite    Indications    Heart failure (H) [I50.9]  LVAD (left ventricular assist device) present (H) [Z95.811]             Anticoagulation Episode Summary     INR check location:       Preferred lab:       Send INR reminders to:   [facility]    Comments:   LVAD implanted 12/31/18  3  ASA 81mg Daily Has 2.5mg tablets   [place] lab fuz=[phone].  Faxed S.O there on 2/25/2019 7/9/19 Follow INR's only no Chromogenic X's      Anticoagulation Care Providers     Provider Role Specialty Phone number    Jenni Ortiz MD [facility] Cardiology [phone]            See the Encounter Report to view Anticoagulation Flowsheet  and Dosing Calendar (Go to Encounters tab in chart review, and find the Anticoagulation Therapy Visit)    Spoke with patient.     Sophy Griggs RN

## 2020-01-31 ENCOUNTER — TELEPHONE (OUTPATIENT)
Dept: TRANSPLANT | Facility: CLINIC | Age: 60
End: 2020-01-31

## 2020-02-03 ENCOUNTER — DOCUMENTATION ONLY (OUTPATIENT)
Dept: TRANSPLANT | Facility: CLINIC | Age: 60
End: 2020-02-03

## 2020-02-03 NOTE — PROGRESS NOTES
Status 4 Heart Extension Complete 02/03/20    This patient meets status 4 criteria as evidenced by:     Dischargeable LVAD without discretionary 30 days     Patient's primary cardiologist and/or attending physician is in agreement with this plan.      Status 4 Extension due 05/04/20

## 2020-02-05 NOTE — TELEPHONE ENCOUNTER
Mariusz returned my call and said his lab relocated and had kept his  kits there. He said he will see if they are there this Friday, and if not he will let me know to send some out. He knows he needs a PRA drawn around 2/17/2020.

## 2020-02-08 ENCOUNTER — DOCUMENTATION ONLY (OUTPATIENT)
Dept: TRANSPLANT | Facility: CLINIC | Age: 60
End: 2020-02-08

## 2020-02-08 DIAGNOSIS — Z76.82 AWAITING ORGAN TRANSPLANT: Primary | ICD-10-CM

## 2020-02-09 NOTE — PROGRESS NOTES
PATHOLOGY HLA CROSSMATCH CONSULTATION: DONOR/RECIPIENT  VIRTUAL CROSSMATCH - Heart  Consultation Date: 2020  Consultation Requested by: Dr. Severino    Regarding: Compatibility of  donor organ UNOS #[id number] from OPO: MNOP  with Mariusz Sharp    Findings: Regarding a virtual crossmatch between Mariusz Sharp and  donor listed above (match ID [id number]):  The most recent (11.7.19) and 3 additional patient serum/sera  were analyzed.  The patient has no antibodies listed with HLA specificity against the donor organ.      Record Review Indicates: I personally reviewed the most recent serum, the historic peak sera, and all other sera with solid-phase HLA Single Antigen test results:  The patient has no HLA antibodies against the donor organ.     The results of this virtual XM are:   -most recent serum: compatible( is > 3 months old. Virtual crossmatch may not predict physical crosmatch.)   -peak #1: compatible  -peak #2: compatible    Disclaimer: Clinical judgement must take into account other factors, such as non-HLA antibodies not detected in the assay. The VXM gives probabilities only.  The probability does not account for the potential for auto-antibodies that may be present in the patient's serum.  These autoantibodies may render the physical crossmatch falsely positive, and would be detected by an autologous crossmatch.  When possible, confirm findings with prospective allogeneic and autologous flow crossmatches before going to transplant as clinically indicated.     Hien Sow MD  Medical Director, Immunology/Histocompatibility Laboratory

## 2020-02-12 ENCOUNTER — DOCUMENTATION ONLY (OUTPATIENT)
Dept: CARDIOLOGY | Facility: CLINIC | Age: 60
End: 2020-02-12

## 2020-02-12 DIAGNOSIS — I25.5 ISCHEMIC CARDIOMYOPATHY: Primary | ICD-10-CM

## 2020-02-12 NOTE — PROGRESS NOTES
VAD Coordinator reached out to patient this morning regarding his last blood pressure check. Patient was seen in [place] last week to check his blood pressure and got a reading in the low 90s. Patient was advised to up his dose of Coreg from 25/12.5mg to 25/25mg per day. Patient was instructed to call the coordinator on call if he experiences any dizziness, and we would change his dose back. Patient verbalized understanding.

## 2020-02-13 ENCOUNTER — ANTICOAGULATION THERAPY VISIT (OUTPATIENT)
Dept: ANTICOAGULATION | Facility: CLINIC | Age: 60
End: 2020-02-13

## 2020-02-13 DIAGNOSIS — Z95.811 LVAD (LEFT VENTRICULAR ASSIST DEVICE) PRESENT (H): ICD-10-CM

## 2020-02-13 DIAGNOSIS — I50.9 HEART FAILURE (H): ICD-10-CM

## 2020-02-13 LAB — INR PPP: 1.7 (ref 0.9–1.1)

## 2020-02-13 NOTE — PROGRESS NOTES
ANTICOAGULATION FOLLOW-UP CLINIC VISIT    Patient Name:  Mariusz Sharp  Date:  2020  Contact Type:  Telephone    SUBJECTIVE:  Patient Findings     Comments:   Patient reports he had 2 cans of V8 and has been eating a lot of salads.  Patient reports he cannot have his INR checked over the weekend as LVAD protocol dictates.         Clinical Outcomes     Comments:   Patient reports he had 2 cans of V8 and has been eating a lot of salads.  Patient reports he cannot have his INR checked over the weekend as LVAD protocol dictates.            OBJECTIVE    INR   Date Value Ref Range Status   2020 1.7 (A) 0.90 - 1.10 Final     Chromogenic Factor 10   Date Value Ref Range Status   2019 72 70 - 130 % Final     Comment:     Therapeutic Range:  A Chromogenic Factor 10 level of approximately 20-40%   inversely correlates with an INR of 2-3 for patients receiving Warfarin.   Chromogenic Factor 10 levels below 20% indicate an INR greater than 3 and   levels above 40% indicate an INR less than 2.         ASSESSMENT / PLAN  INR assessment SUB    Recheck INR In: 4 DAYS    INR Location Clinic      Anticoagulation Summary  As of 2020    INR goal:   2.0-3.0   TTR:   75.4 % (1 y)   INR used for dosin.7! (2020)   Warfarin maintenance plan:   5 mg (2.5 mg x 2) every day   Full warfarin instructions:   : 7.5 mg; Otherwise 5 mg every day   Weekly warfarin total:   35 mg   Plan last modified:   Sophy Griggs RN (2019)   Next INR check:   2020   Priority:   Critical   Target end date:   Indefinite    Indications    Heart failure (H) [I50.9]  LVAD (left ventricular assist device) present (H) [Z95.811]             Anticoagulation Episode Summary     INR check location:       Preferred lab:       Send INR reminders to:   [facility]    Comments:   LVAD implanted 18  3  ASA 81mg Daily Has 2.5mg tablets   [place] [facility] dxe=[phone].  Faxed S.O there on 2019 Follow  INR's only no Chromogenic X's      Anticoagulation Care Providers     Provider Role Specialty Phone number    Jenni Ortiz MD Referring Cardiology [phone]            See the Encounter Report to view Anticoagulation Flowsheet and Dosing Calendar (Go to Encounters tab in chart review, and find the Anticoagulation Therapy Visit)    Spoke with patient.     Sophy Griggs RN

## 2020-02-17 ENCOUNTER — ANTICOAGULATION THERAPY VISIT (OUTPATIENT)
Dept: ANTICOAGULATION | Facility: CLINIC | Age: 60
End: 2020-02-17

## 2020-02-17 DIAGNOSIS — Z94.1 HEART REPLACED BY TRANSPLANT (H): ICD-10-CM

## 2020-02-17 DIAGNOSIS — I50.9 HEART FAILURE (H): ICD-10-CM

## 2020-02-17 DIAGNOSIS — Z95.811 LVAD (LEFT VENTRICULAR ASSIST DEVICE) PRESENT (H): ICD-10-CM

## 2020-02-17 LAB — INR PPP: 1.9 (ref 0.9–1.1)

## 2020-02-17 NOTE — PROGRESS NOTES
ANTICOAGULATION FOLLOW-UP CLINIC VISIT    Patient Name:  Mariusz Sharp  Date:  2020  Contact Type:  Telephone    SUBJECTIVE:  Patient Findings     Positives:   Change in diet/appetite    Comments:   Pt reports he is cut back on his V8 drinking and salads. Writer explained if this is a diet lifestyle that he wants to continue then we are ok with this as long as he keeps things consistent. Pt communicated understanding. Previous maintenance dose pt was ok with 7.5mg once a week and 5mg ROW.         Clinical Outcomes     Comments:   Pt reports he is cut back on his V8 drinking and salads. Writer explained if this is a diet lifestyle that he wants to continue then we are ok with this as long as he keeps things consistent. Pt communicated understanding. Previous maintenance dose pt was ok with 7.5mg once a week and 5mg ROW.            OBJECTIVE    INR   Date Value Ref Range Status   2020 1.9 (A) 0.90 - 1.10 Final     Comment:     Outside Lab      Chromogenic Factor 10   Date Value Ref Range Status   2019 72 70 - 130 % Final     Comment:     Therapeutic Range:  A Chromogenic Factor 10 level of approximately 20-40%   inversely correlates with an INR of 2-3 for patients receiving Warfarin.   Chromogenic Factor 10 levels below 20% indicate an INR greater than 3 and   levels above 40% indicate an INR less than 2.         ASSESSMENT / PLAN  INR assessment SUB    Recheck INR In: 3 DAYS    INR Location Outside lab      Anticoagulation Summary  As of 2020    INR goal:   2.0-3.0   TTR:   74.3 % (1 y)   INR used for dosin.9! (2020)   Warfarin maintenance plan:   5 mg (2.5 mg x 2) every day   Full warfarin instructions:   : 7.5 mg; Otherwise 5 mg every day   Weekly warfarin total:   35 mg   Plan last modified:   Sophy Griggs RN (2019)   Next INR check:   2020   Priority:   Critical   Target end date:   Indefinite    Indications    Heart failure (H) [I50.9]  LVAD (left ventricular  assist device) present (H) [Z95.811]             Anticoagulation Episode Summary     INR check location:       Preferred lab:       Send INR reminders to:   [facility]    Comments:   LVAD implanted 12/31/18 HM 3  ASA 81mg Daily Has 2.5mg tablets   [place] lab knf=[phone].  Faxed S.O there on 2/25/2019 7/9/19 Follow INR's only no Chromogenic X's      Anticoagulation Care Providers     Provider Role Specialty Phone number    Jenni Ortiz MD Referring Cardiology [phone]            See the Encounter Report to view Anticoagulation Flowsheet and Dosing Calendar (Go to Encounters tab in chart review, and find the Anticoagulation Therapy Visit)    Spoke with patient. Gave them their lab results and new warfarin recommendation.  No changes in health, medication, or diet. No missed doses, no falls. No signs or symptoms of bleed or clotting.     Patient had LVAD placed on:   12/31/18  Type of LVAD: HM 3  Patient's current Aspirin dose: ASA 81mg Daily  LVAD Protocol followed: Yes   If Not Followed Explanation:  N/A    Chasity Gonzalez RN

## 2020-02-20 ENCOUNTER — ANTICOAGULATION THERAPY VISIT (OUTPATIENT)
Dept: ANTICOAGULATION | Facility: CLINIC | Age: 60
End: 2020-02-20

## 2020-02-20 DIAGNOSIS — I50.9 HEART FAILURE (H): ICD-10-CM

## 2020-02-20 DIAGNOSIS — Z95.811 LVAD (LEFT VENTRICULAR ASSIST DEVICE) PRESENT (H): ICD-10-CM

## 2020-02-20 LAB
INR PPP: 2.4 (ref 0.9–1.1)
SA1 CELL: NORMAL
SA1 COMMENTS: NORMAL
SA1 HI RISK ABY: NORMAL
SA1 MOD RISK ABY: NORMAL
SA1 TEST METHOD: NORMAL
SA2 CELL: NORMAL
SA2 COMMENTS: NORMAL
SA2 HI RISK ABY UA: NORMAL
SA2 MOD RISK ABY: NORMAL
SA2 TEST METHOD: NORMAL
UNACCEPTABLE ANTIGEN: NORMAL
UNOS CPRA: 0

## 2020-02-20 NOTE — PROGRESS NOTES
ANTICOAGULATION FOLLOW-UP CLINIC VISIT    Patient Name:  Mariusz Sharp  Date:  2020  Contact Type:  Telephone    SUBJECTIVE:         OBJECTIVE    INR   Date Value Ref Range Status   2020 2.4 (A) 0.90 - 1.10 Final     Comment:     Outside lab     Chromogenic Factor 10   Date Value Ref Range Status   2019 72 70 - 130 % Final     Comment:     Therapeutic Range:  A Chromogenic Factor 10 level of approximately 20-40%   inversely correlates with an INR of 2-3 for patients receiving Warfarin.   Chromogenic Factor 10 levels below 20% indicate an INR greater than 3 and   levels above 40% indicate an INR less than 2.         ASSESSMENT / PLAN  INR assessment THER    Recheck INR In: 1 WEEK    INR Location Clinic      Anticoagulation Summary  As of 2020    INR goal:   2.0-3.0   TTR:   74.1 % (1 y)   INR used for dosin.4 (2020)   Warfarin maintenance plan:   5 mg (2.5 mg x 2) every day   Full warfarin instructions:   5 mg every day   Weekly warfarin total:   35 mg   No change documented:   Sandor Sanchez RN   Plan last modified:   Sophy Griggs RN (2019)   Next INR check:   2020   Priority:   Critical   Target end date:   Indefinite    Indications    Heart failure (H) [I50.9]  LVAD (left ventricular assist device) present (H) [Z95.811]             Anticoagulation Episode Summary     INR check location:       Preferred lab:       Send INR reminders to:   [facility]    Comments:   LVAD implanted 18  3  ASA 81mg Daily Has 2.5mg tablets   [place] [facility] vrm=[phone].  Faxed S.O there on 2019 Follow INR's only no Chromogenic X's      Anticoagulation Care Providers     Provider Role Specialty Phone number    Jenni Ortiz MD Referring Cardiology [phone]            See the Encounter Report to view Anticoagulation Flowsheet and Dosing Calendar (Go to Encounters tab in chart review, and find the Anticoagulation Therapy Visit)    INR/CFX/F2  RESULT: INR result is 2.4    ASSESSMENT:No Problems found. No Changes in Health, Medications, or diet. No Signs of bruising, bleeding or clotting.    DOSING ADJUSTMENT: continue 5mg daily    NEXT INR/FACTOR X OR FACTOR II:2/27    PROTOCOL FOLLOWED:goal 2-3  Patient had LVAD placed on:   12/31/18  Type of LVAD: HM 3  Patient's current Aspirin dose: 81mg  LVAD Protocol followed:  Yes.   If Not Followed Explanation:  Sandor Zaman RN

## 2020-02-26 DIAGNOSIS — I50.21 ACUTE SYSTOLIC HEART FAILURE (H): ICD-10-CM

## 2020-02-27 ENCOUNTER — ANTICOAGULATION THERAPY VISIT (OUTPATIENT)
Dept: ANTICOAGULATION | Facility: CLINIC | Age: 60
End: 2020-02-27

## 2020-02-27 DIAGNOSIS — Z95.811 LVAD (LEFT VENTRICULAR ASSIST DEVICE) PRESENT (H): ICD-10-CM

## 2020-02-27 DIAGNOSIS — I50.9 HEART FAILURE (H): ICD-10-CM

## 2020-02-27 DIAGNOSIS — I50.23 ACUTE ON CHRONIC SYSTOLIC HEART FAILURE (H): ICD-10-CM

## 2020-02-27 DIAGNOSIS — I10 ESSENTIAL HYPERTENSION: ICD-10-CM

## 2020-02-27 LAB — INR PPP: 2.1 (ref 0.9–1.1)

## 2020-02-27 RX ORDER — DIGOXIN 125 MCG
TABLET ORAL
Qty: 90 TABLET | Refills: 3 | OUTPATIENT
Start: 2020-02-27

## 2020-02-27 RX ORDER — CARVEDILOL 12.5 MG/1
TABLET ORAL
Qty: 180 TABLET | Refills: 3 | Status: SHIPPED | OUTPATIENT
Start: 2020-02-27 | End: 2020-03-13

## 2020-02-27 NOTE — PROGRESS NOTES
ANTICOAGULATION FOLLOW-UP CLINIC VISIT    Patient Name:  Mariusz Sharp  Date:  2020  Contact Type:  Telephone    SUBJECTIVE:         OBJECTIVE    INR   Date Value Ref Range Status   2020 2.1 (A) 0.90 - 1.10 Final     Chromogenic Factor 10   Date Value Ref Range Status   2019 72 70 - 130 % Final     Comment:     Therapeutic Range:  A Chromogenic Factor 10 level of approximately 20-40%   inversely correlates with an INR of 2-3 for patients receiving Warfarin.   Chromogenic Factor 10 levels below 20% indicate an INR greater than 3 and   levels above 40% indicate an INR less than 2.         ASSESSMENT / PLAN  No question data found.  Anticoagulation Summary  As of 2020    INR goal:   2.0-3.0   TTR:   75.7 % (1 y)   INR used for dosin.1 (2020)   Warfarin maintenance plan:   5 mg (2.5 mg x 2) every day   Full warfarin instructions:   : 6.25 mg; Otherwise 5 mg every day   Weekly warfarin total:   35 mg   Plan last modified:   Sophy Griggs RN (2019)   Next INR check:   3/5/2020   Priority:   Critical   Target end date:   Indefinite    Indications    Heart failure (H) [I50.9]  LVAD (left ventricular assist device) present (H) [Z95.811]             Anticoagulation Episode Summary     INR check location:       Preferred lab:       Send INR reminders to:   [facility]    Comments:   LVAD implanted 18 HM 3  ASA 81mg Daily Has 2.5mg tablets   [place] lab drn=[phone].  Faxed S.O there on 2019 Follow INR's only no Chromogenic X's      Anticoagulation Care Providers     Provider Role Specialty Phone number    Jenni Ortiz MD Referring Cardiology [phone]            See the Encounter Report to view Anticoagulation Flowsheet and Dosing Calendar (Go to Encounters tab in chart review, and find the Anticoagulation Therapy Visit)    Spoke with patient.     Sophy Griggs RN

## 2020-02-28 DIAGNOSIS — I50.21 ACUTE SYSTOLIC HEART FAILURE (H): ICD-10-CM

## 2020-02-28 DIAGNOSIS — I50.22 CHRONIC SYSTOLIC CONGESTIVE HEART FAILURE (H): ICD-10-CM

## 2020-02-28 DIAGNOSIS — Z95.811 LVAD (LEFT VENTRICULAR ASSIST DEVICE) PRESENT (H): ICD-10-CM

## 2020-02-28 RX ORDER — ROSUVASTATIN CALCIUM 20 MG/1
20 TABLET, COATED ORAL AT BEDTIME
Qty: 90 TABLET | Refills: 0 | Status: ON HOLD | OUTPATIENT
Start: 2020-02-28 | End: 2020-05-29

## 2020-02-28 RX ORDER — HYDRALAZINE HYDROCHLORIDE 50 MG/1
100 TABLET, FILM COATED ORAL 3 TIMES DAILY
Qty: 180 TABLET | Refills: 5 | OUTPATIENT
Start: 2020-02-28

## 2020-02-28 NOTE — TELEPHONE ENCOUNTER
famotidine (PEPCID) 20 MG tablet      Last Written Prescription Date:  2-11-19  Last Fill Quantity: 180,   # refills: 3  Last Office Visit : 12-4-19  Future Office visit:  3-    Routing refill request to provider for review/approval because:  Not on protocol.      Kathleen M Doege RN

## 2020-03-05 ENCOUNTER — ANTICOAGULATION THERAPY VISIT (OUTPATIENT)
Dept: ANTICOAGULATION | Facility: CLINIC | Age: 60
End: 2020-03-05

## 2020-03-05 DIAGNOSIS — Z95.811 LVAD (LEFT VENTRICULAR ASSIST DEVICE) PRESENT (H): ICD-10-CM

## 2020-03-05 DIAGNOSIS — I50.9 HEART FAILURE (H): ICD-10-CM

## 2020-03-05 LAB — INR PPP: 2.3 (ref 0.9–1.1)

## 2020-03-05 NOTE — PROGRESS NOTES
ANTICOAGULATION FOLLOW-UP CLINIC VISIT    Patient Name:  Mariusz Sharp  Date:  3/5/2020  Contact Type:  Telephone    SUBJECTIVE:  Patient Findings     Comments:   No Problems found. No Changes in Health, Medications, or diet. No Signs of bruising, bleeding or clotting.        Clinical Outcomes     Comments:   No Problems found. No Changes in Health, Medications, or diet. No Signs of bruising, bleeding or clotting.           OBJECTIVE    INR   Date Value Ref Range Status   2020 2.3 (A) 0.90 - 1.10 Final     Comment:     Outside lab     Chromogenic Factor 10   Date Value Ref Range Status   2019 72 70 - 130 % Final     Comment:     Therapeutic Range:  A Chromogenic Factor 10 level of approximately 20-40%   inversely correlates with an INR of 2-3 for patients receiving Warfarin.   Chromogenic Factor 10 levels below 20% indicate an INR greater than 3 and   levels above 40% indicate an INR less than 2.         ASSESSMENT / PLAN  INR assessment THER    Recheck INR In: 8 DAYS    INR Location Outside lab      Anticoagulation Summary  As of 3/5/2020    INR goal:   2.0-3.0   TTR:   76.7 % (1 y)   INR used for dosin.3 (3/5/2020)   Warfarin maintenance plan:   5 mg (2.5 mg x 2) every day   Full warfarin instructions:   5 mg every day   Weekly warfarin total:   35 mg   No change documented:   Sandor Sanchez RN   Plan last modified:   Sophy Griggs RN (2019)   Next INR check:   3/13/2020   Priority:   Critical   Target end date:   Indefinite    Indications    Heart failure (H) [I50.9]  LVAD (left ventricular assist device) present (H) [Z95.811]             Anticoagulation Episode Summary     INR check location:       Preferred lab:       Send INR reminders to:   [facility]    Comments:   LVAD implanted 18  3  ASA 81mg Daily Has 2.5mg tablets   [place] lab lfn=[phone].  Faxed S.O there on 2019 Follow INR's only no Chromogenic X's      Anticoagulation Care Providers      Provider Role Specialty Phone number    Jenni Ortiz MD Referring Cardiology [phone]            See the Encounter Report to view Anticoagulation Flowsheet and Dosing Calendar (Go to Encounters tab in chart review, and find the Anticoagulation Therapy Visit)    INR/CFX/F2 RESULT: INR result is 2.3 per outside lab    ASSESSMENT:No Problems found. No Changes in Health, Medications, or diet. No Signs of bruising, bleeding or clotting.    DOSING ADJUSTMENT: continue 5mg daily    NEXT INR/FACTOR X OR FACTOR II:3/13 at next appt    PROTOCOL FOLLOWED:LVAD goal 2-3  Patient had LVAD placed on:   12/13/18  Type of LVAD: HM 3  Patient's current Aspirin dose: 81mg  LVAD Protocol followed:  Yes.   If Not Followed Explanation:  Sandor Zaman RN

## 2020-03-09 DIAGNOSIS — I50.22 CHRONIC SYSTOLIC CONGESTIVE HEART FAILURE (H): Primary | ICD-10-CM

## 2020-03-11 ENCOUNTER — HEALTH MAINTENANCE LETTER (OUTPATIENT)
Age: 60
End: 2020-03-11

## 2020-03-11 RX ORDER — FAMOTIDINE 20 MG/1
20 TABLET, FILM COATED ORAL 2 TIMES DAILY
Qty: 180 TABLET | Refills: 3 | Status: ON HOLD | OUTPATIENT
Start: 2020-03-11 | End: 2020-05-29

## 2020-03-12 ENCOUNTER — CARE COORDINATION (OUTPATIENT)
Dept: CARDIOLOGY | Facility: CLINIC | Age: 60
End: 2020-03-12

## 2020-03-12 NOTE — PROGRESS NOTES
"D:  Pt scheduled to come to clinic 3/13.  I:  Called pt to notify him that appt tomorrow will be a \"virtual\" appt to limit exposure of pts in clinic due to Covid -19.  Pt instructed to send transmission from his device.  NP will call at 11:30.  Palliative will be rescheduled and software change to his controller will also be rescheduled.  A:  Pt verbalized understanding.  P:  Virtual appt 3/13 at 11:30.  "

## 2020-03-13 ENCOUNTER — VIRTUAL VISIT (OUTPATIENT)
Dept: CARDIOLOGY | Facility: CLINIC | Age: 60
End: 2020-03-13
Attending: INTERNAL MEDICINE
Payer: COMMERCIAL

## 2020-03-13 ENCOUNTER — TELEPHONE (OUTPATIENT)
Dept: TRANSPLANT | Facility: CLINIC | Age: 60
End: 2020-03-13

## 2020-03-13 ENCOUNTER — ANTICOAGULATION THERAPY VISIT (OUTPATIENT)
Dept: ANTICOAGULATION | Facility: CLINIC | Age: 60
End: 2020-03-13

## 2020-03-13 DIAGNOSIS — I10 ESSENTIAL HYPERTENSION: ICD-10-CM

## 2020-03-13 DIAGNOSIS — I50.9 HEART FAILURE (H): ICD-10-CM

## 2020-03-13 DIAGNOSIS — Z95.811 LVAD (LEFT VENTRICULAR ASSIST DEVICE) PRESENT (H): ICD-10-CM

## 2020-03-13 LAB — INR PPP: 2.9 (ref 0.9–1.1)

## 2020-03-13 PROCEDURE — 99441 ZZC PHYSICIAN TELEPHONE EVALUATION 5-10 MIN: CPT | Mod: ZP | Performed by: NURSE PRACTITIONER

## 2020-03-13 RX ORDER — CARVEDILOL 12.5 MG/1
25 TABLET ORAL 2 TIMES DAILY WITH MEALS
Qty: 180 TABLET | Refills: 3 | Status: ON HOLD | COMMUNITY
Start: 2020-03-13 | End: 2020-05-29

## 2020-03-13 NOTE — PROGRESS NOTES
ANTICOAGULATION FOLLOW-UP CLINIC VISIT    Patient Name:  Mariusz Sharp  Date:  3/13/2020  Contact Type:  Telephone    SUBJECTIVE:         OBJECTIVE    INR   Date Value Ref Range Status   2020 2.9 (A) 0.90 - 1.10 Final     Chromogenic Factor 10   Date Value Ref Range Status   2019 72 70 - 130 % Final     Comment:     Therapeutic Range:  A Chromogenic Factor 10 level of approximately 20-40%   inversely correlates with an INR of 2-3 for patients receiving Warfarin.   Chromogenic Factor 10 levels below 20% indicate an INR greater than 3 and   levels above 40% indicate an INR less than 2.         ASSESSMENT / PLAN  No question data found.  Anticoagulation Summary  As of 3/13/2020    INR goal:   2.0-3.0   TTR:   76.9 % (1 y)   INR used for dosin.9 (3/13/2020)   Warfarin maintenance plan:   5 mg (2.5 mg x 2) every day   Full warfarin instructions:   5 mg every day   Weekly warfarin total:   35 mg   Plan last modified:   Sophy Griggs RN (2019)   Next INR check:   3/20/2020   Priority:   Critical   Target end date:   Indefinite    Indications    Heart failure (H) [I50.9]  LVAD (left ventricular assist device) present (H) [Z95.811]             Anticoagulation Episode Summary     INR check location:       Preferred lab:       Send INR reminders to:   [facility]    Comments:   LVAD implanted 18  3  ASA 81mg Daily Has 2.5mg tablets   [place] [facility] pem=[phone].  Faxed S.O there on 2019 Follow INR's only no Chromogenic X's      Anticoagulation Care Providers     Provider Role Specialty Phone number    Jenni Ortiz MD Referring Cardiology [phone]            See the Encounter Report to view Anticoagulation Flowsheet and Dosing Calendar (Go to Encounters tab in chart review, and find the Anticoagulation Therapy Visit)    Spoke with patient.     Sophy Griggs RN

## 2020-03-13 NOTE — PROGRESS NOTES
"Mariusz Sharp is a 59 year old male who is being evaluated via a billable telephone visit.      The patient has been notified of following:     \"This telephone visit will be conducted via a call between you and your physician/provider. We have found that certain health care needs can be provided without the need for a physical exam.  This service lets us provide the care you need with a short phone conversation.  If a prescription is necessary we can send it directly to your pharmacy.  If lab work is needed we can place an order for that and you can then stop by our lab to have the test done at a later time.    If during the course of the call the physician/provider feels a telephone visit is not appropriate, you will not be charged for this service.\"     Consent has been obtained for this service by 1 care team member: Yes. See the scanned image in the medical record.    Mariusz Sharp complains of  No chief complaint on file.      I have reviewed and updated the patient's Past Medical History, Social History, Family History and Medication List.    ALLERGIES  Patient has no known allergies.    Current Outpatient Medications   Medication Sig Dispense Refill     aspirin (ASA) 81 MG chewable tablet Take 1 tablet (81 mg) by mouth daily 90 tablet 3     carvedilol (COREG) 12.5 MG tablet TAKE 2 TABLETS (25mg) BY MOUTH EVERY MORNING AND 2 TABLETS (25mg) EVERY AFTERNOON 180 tablet 3     digoxin (LANOXIN) 125 MCG tablet Take 1 tablet (125 mcg) by mouth four times a week 90 tablet 3     famotidine (PEPCID) 20 MG tablet Take 1 tablet (20 mg) by mouth 2 times daily 180 tablet 3     hydrALAZINE (APRESOLINE) 50 MG tablet Take 1 tablet (50 mg) by mouth 3 times daily 180 tablet 5     insulin aspart (NOVOLOG PEN) 100 UNIT/ML pen Prandial Dosin units per 7 grams of carbohydrate each Meal or Snack.  Only chart total amount of units given.  Do not give if pre-prandial glucose is less than 60 mg/dL. If given at mealtime, " administer within 30 minutes of start of meal. 3 mL 1     insulin glargine (LANTUS SOLOSTAR PEN) 100 UNIT/ML pen Inject 24 Units Subcutaneous At Bedtime 3 mL 11     losartan (COZAAR) 25 MG tablet Take 1 tablet (25 mg) by mouth daily 30 tablet 11     rosuvastatin (CRESTOR) 20 MG tablet Take 1 tablet (20 mg) by mouth At Bedtime 90 tablet 0     warfarin (COUMADIN) 2.5 MG tablet Take two to three tablets daily or as directed by the Coumadin clinic 150 tablet 5     insulin aspart (NOVOLOG PEN) 100 UNIT/ML pen Inject 1-10 Units Subcutaneous 3 times daily (before meals) Correction Scale - HIGH INSULIN RESISTANCE DOSING     -164 =1 units.   -189 =2.   -214 =3.   -239 =4.   -264 =5.   -289 =6.   -314 =7.   -339 =8.   -364 =9.  BG greater than or equal to 365 give 10 units  To be given with meal insulin, based on pre-meal BG 9 mL 0     insulin aspart (NOVOLOG PEN) 100 UNIT/ML pen Inject 1-7 Units Subcutaneous At Bedtime HIGH INSULIN RESISTANCE DOSING    Bedtime  For  - 224 give 1 units.   For  - 249 give 2 units.   For  - 274 give 3 units.   For  - 299 give 4 units.   For  - 324 give 5 units.   For  - 349 give 6 units.   For BG greater than or equal to 350 give 7 units. (Patient not taking: Reported on 2/11/2019) 2.1 mL 0     nitroGLYcerin (NITROSTAT) 0.4 MG sublingual tablet Place 1 tablet (0.4 mg) under the tongue every 5 minutes as needed for chest pain For chest pain place 1 tablet under the tongue every 5 minutes for 3 doses. If symptoms persist 5 minutes after 1st dose call 911. 10 tablet 0     Additional provider notes:   He attempts to maintain 23821 steps daily. He complains of muscle fatigue after 94694 steps once a week. He denies lightheadedness, dizziness, chest pain, palpitations, SOB, cough, and abdominal bloating. He complains of LE edema. He notes weight loss around 199.2 lbs. 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 He notes resolution of prior LVAD alarms with reduction in Hydralazine.     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 Phone visit via [facility].     Chronic systolic heart failure secondary to ICM s/p HM3 12/31/18  Stage C, NYHA Class III  ACEi/ARB: Llosartan 25 mg at bedtime, defer increase due to CKD. Hydralazine 50 mg po TID.   BB  Coreg 25 mg po BID  SCD prophylaxis ICD.  Antiplatelet: ASA 81 mg  Anticoagulation: coumadin with INR goal of 2-3. Management per AC.   - Per documentation he remains Status IV on the cardiac transplant list. He notes he received a letter regarding his transplant listing. I will have the coordinator contact him regarding this.       CAD. s/p STEMI with ALYSSA to LAD 6/27/18  - Continue Crestor and BB     PAF. CHADSVASC-4. No arrhythmias on recent ICD checks  - Coumadin per AC.   - Continue BB     VT/VF. With cardiac arrest. Amiodarone discontinued.   - No ICD shocks     LV thrombus.   - Coumadin as above.      CKD Stage III.     I have reviewed the note as documented above.  This accurately captures the substance of my conversation with the patient.      Phone call contact time  Call Started at 1153  Call Ended at 1203    CARMEN Dodson CNP  3/13/2020

## 2020-03-13 NOTE — TELEPHONE ENCOUNTER
Spoke to patient as he had questions about a UNOS change to status 7.  Patient verified in UNOS as status 4 and letter was sent last summer when he was traveling.  Verified with patient that he received a letter that he was then upgraded when he returned from travel.  Patient had no further questions and encouraged to call coordinator with any questions/concerns he has.

## 2020-03-16 ENCOUNTER — CARE COORDINATION (OUTPATIENT)
Dept: CARDIOLOGY | Facility: CLINIC | Age: 60
End: 2020-03-16

## 2020-03-16 NOTE — LETTER
Patient Name: Mariusz Sharp     : 1960     Diagnosis/ICD-10: Heart Failure, unspecified I50.9; LVAD Z95.811   Requesting Physician: Dr. Jenni Ortiz   Date of Request: 20     Date to Draw      **Please fax results to Nedra Roa RN VAD Coord at [phone].                Call Nedra [phone] with Questions      ORDER CODE TESTS OYHANA.VOL.    CBASIC Na, K, Cl, CO2, Crea, BUN, Glu, Ca GG 0.5-1    BHEPAT Alb, AlkP, ALT, AST, BBil, TP GG 0.5-1    BLIP Chol, Trig, HDL, LDL GG 1-2   X CCOMP Na, K, Cl, CO2, Crea, BUN, Glu, Ca, Alb, AlkP, ALT, AST, Bbil, TP,  GG 0.6-1   X HGP CBC & Platelet P 0.3-1    HGDP CBC, Differential & Platelet P 0.3-1    PLT Platelet  P 0.3-1   X INR INR B 2.7    PTT PTT B 2.7   X LD Lactate Dehydrogenase GG 0.3-1    PHGB Plasma Hemoglobin GG 2-3    Pre-Albumin Pre-Albumin RG 1.0   X  D dimer              Signed,      Jenni Ortiz MD  Heart Failure, Mechanical Circulatory Support and Transplant Cardiology   of Medicine,  Division of Cardiology, [facility]

## 2020-03-16 NOTE — PROGRESS NOTES
Called patient to follow up from telephone visit on 3/13/2020. Patient denied any questions. Instructed pt to get labs drawn when he goes for his next INR.  Patient stated that he is going on Friday 3/20/2020.  Per Antonia Byrne, sent lab orders for routine clinic labs.  Will follow up with patient once lab results are received and reviewed with provider.  Instructed pt to call VAD Coordinator on-call with any questions or concerns; verbalized understanding.

## 2020-03-20 ENCOUNTER — ANTICOAGULATION THERAPY VISIT (OUTPATIENT)
Dept: ANTICOAGULATION | Facility: CLINIC | Age: 60
End: 2020-03-20

## 2020-03-20 DIAGNOSIS — Z95.811 LVAD (LEFT VENTRICULAR ASSIST DEVICE) PRESENT (H): ICD-10-CM

## 2020-03-20 DIAGNOSIS — I50.9 HEART FAILURE (H): ICD-10-CM

## 2020-03-20 LAB — INR PPP: 2.5 (ref 0.9–1.1)

## 2020-03-20 NOTE — PROGRESS NOTES
ANTICOAGULATION FOLLOW-UP CLINIC VISIT    Patient Name:  Mariusz Sharp  Date:  3/20/2020  Contact Type:  Telephone    SUBJECTIVE:         OBJECTIVE    INR   Date Value Ref Range Status   2020 2.5 (A) 0.90 - 1.10 Final     Comment:     [facility]     Chromogenic Factor 10   Date Value Ref Range Status   2019 72 70 - 130 % Final     Comment:     Therapeutic Range:  A Chromogenic Factor 10 level of approximately 20-40%   inversely correlates with an INR of 2-3 for patients receiving Warfarin.   Chromogenic Factor 10 levels below 20% indicate an INR greater than 3 and   levels above 40% indicate an INR less than 2.         ASSESSMENT / PLAN  INR assessment THER    Recheck INR In: 2 WEEKS    INR Location Outside lab      Anticoagulation Summary  As of 3/20/2020    INR goal:   2.0-3.0   TTR:   76.9 % (1 y)   INR used for dosin.5 (3/20/2020)   Warfarin maintenance plan:   5 mg (2.5 mg x 2) every day   Full warfarin instructions:   5 mg every day   Weekly warfarin total:   35 mg   No change documented:   Sandor Sanchez RN   Plan last modified:   Sophy Griggs RN (2019)   Next INR check:   4/3/2020   Priority:   Critical   Target end date:   Indefinite    Indications    Heart failure (H) [I50.9]  LVAD (left ventricular assist device) present (H) [Z95.811]             Anticoagulation Episode Summary     INR check location:       Preferred lab:       Send INR reminders to:   [facility]    Comments:   LVAD implanted 18  3  ASA 81mg Daily Has 2.5mg tablets   [place] lab fpm=[phone].  Faxed S.O there on 2019 Follow INR's only no Chromogenic X's      Anticoagulation Care Providers     Provider Role Specialty Phone number    Jenni Ortiz MD Referring Cardiology [phone]            See the Encounter Report to view Anticoagulation Flowsheet and Dosing Calendar (Go to Encounters tab in chart review, and find the Anticoagulation Therapy Visit)    INR/CFX/F2  RESULT:INR result is 2.5    ASSESSMENT:No Problems found. No Changes in Health, Medications, or diet. No Signs of bruising, bleeding or clotting.    DOSING ADJUSTMENT: continue 5mg daily    NEXT INR/FACTOR X OR FACTOR II:4/3    PROTOCOL FOLLOWED:LVAD 2-3  Patient had LVAD placed on:   12/31/18  Type of LVAD: HM 3  Patient's current Aspirin dose: 81mg  LVAD Protocol followed:  Yes.   If Not Followed Explanation:  Sandor Zaman RN

## 2020-03-21 LAB
ALBUMIN SERPL-MCNC: 4.4 G/DL
ALP SERPL-CCNC: 49 U/L
ALT SERPL-CCNC: 28 U/L
ANION GAP SERPL CALCULATED.3IONS-SCNC: 8 MMOL/L
AST SERPL-CCNC: 33 U/L
BILIRUB SERPL-MCNC: 1.1 MG/DL
BUN SERPL-MCNC: 30 MG/DL
CALCIUM SERPL-MCNC: 9.1 MG/DL
CHLORIDE SERPLBLD-SCNC: 106 MMOL/L
CO2 SERPL-SCNC: 27 MMOL/L
CREAT SERPL-MCNC: 1.67 MG/DL
GFR SERPL CREATININE-BSD FRML MDRD: 43 ML/MIN/1.73M2
GLUCOSE SERPL-MCNC: 178 MG/DL (ref 70–99)
LDH SERPL-CCNC: 152 U/L
Lab: 0.81
POTASSIUM SERPL-SCNC: 4.4 MMOL/L
PROT SERPL-MCNC: 6.6 G/DL
SODIUM SERPL-SCNC: 141 MMOL/L

## 2020-03-24 ENCOUNTER — CARE COORDINATION (OUTPATIENT)
Dept: CARDIOLOGY | Facility: CLINIC | Age: 60
End: 2020-03-24

## 2020-03-24 DIAGNOSIS — I50.9 HEART FAILURE (H): ICD-10-CM

## 2020-03-24 DIAGNOSIS — Z95.811 LVAD (LEFT VENTRICULAR ASSIST DEVICE) PRESENT (H): ICD-10-CM

## 2020-03-24 RX ORDER — WARFARIN SODIUM 2.5 MG/1
TABLET ORAL
Qty: 60 TABLET | Refills: 3 | Status: SHIPPED | OUTPATIENT
Start: 2020-03-24 | End: 2020-04-08

## 2020-03-24 NOTE — PROGRESS NOTES
VAD coordinator reached out to patient today to review his most recent labs. Per MD, labs are within normal limits. Patient is aware, and reports feeling well today. Patient reports weight being down slightly, and reports trying to get as many steps in as he can every day. Patient also reports less Low Flow alarms on his controller, stating he notices them mainly when he is hungry. Plan to schedule follow up clinic appointment in 3 months. Patient agreeable, and will reach out to VAD coordinator with any questions or concerns.

## 2020-04-03 ENCOUNTER — ANTICOAGULATION THERAPY VISIT (OUTPATIENT)
Dept: ANTICOAGULATION | Facility: CLINIC | Age: 60
End: 2020-04-03

## 2020-04-03 DIAGNOSIS — Z95.811 LVAD (LEFT VENTRICULAR ASSIST DEVICE) PRESENT (H): ICD-10-CM

## 2020-04-03 DIAGNOSIS — I50.9 HEART FAILURE (H): ICD-10-CM

## 2020-04-03 LAB — INR PPP: 2.3 (ref 0.9–1.1)

## 2020-04-03 NOTE — PROGRESS NOTES
ANTICOAGULATION FOLLOW-UP CLINIC VISIT    Patient Name:  Mariusz Sharp  Date:  4/3/2020  Contact Type:  Telephone    SUBJECTIVE:  Patient Findings     Comments:   Per LVAD team patient is able to go out 3-4 weeks if stable.         Clinical Outcomes     Comments:   Per LVAD team patient is able to go out 3-4 weeks if stable.            OBJECTIVE    INR   Date Value Ref Range Status   2020 2.3 (A) 0.90 - 1.10 Final     Chromogenic Factor 10   Date Value Ref Range Status   2019 72 70 - 130 % Final     Comment:     Therapeutic Range:  A Chromogenic Factor 10 level of approximately 20-40%   inversely correlates with an INR of 2-3 for patients receiving Warfarin.   Chromogenic Factor 10 levels below 20% indicate an INR greater than 3 and   levels above 40% indicate an INR less than 2.         ASSESSMENT / PLAN  No question data found.  Anticoagulation Summary  As of 4/3/2020    INR goal:   2.0-3.0   TTR:   76.9 % (1 y)   INR used for dosin.3 (4/3/2020)   Warfarin maintenance plan:   5 mg (2.5 mg x 2) every day   Full warfarin instructions:   5 mg every day   Weekly warfarin total:   35 mg   Plan last modified:   Sophy Griggs RN (2019)   Next INR check:   2020   Priority:   Critical   Target end date:   Indefinite    Indications    Heart failure (H) [I50.9]  LVAD (left ventricular assist device) present (H) [Z95.811]             Anticoagulation Episode Summary     INR check location:       Preferred lab:       Send INR reminders to:   [facility]    Comments:   LVAD implanted 18  3  ASA 81mg Daily Has 2.5mg tablets   [place] lab twu=[phone].  Faxed S.O there on 2019 Follow INR's only no Chromogenic X's      Anticoagulation Care Providers     Provider Role Specialty Phone number    Jenni Ortiz MD Referring Cardiology [phone]            See the Encounter Report to view Anticoagulation Flowsheet and Dosing Calendar (Go to Encounters tab in  chart review, and find the Anticoagulation Therapy Visit)    Spoke with patient.     Sophy Griggs RN

## 2020-04-08 DIAGNOSIS — Z95.811 LVAD (LEFT VENTRICULAR ASSIST DEVICE) PRESENT (H): ICD-10-CM

## 2020-04-08 DIAGNOSIS — I50.9 HEART FAILURE (H): ICD-10-CM

## 2020-04-08 RX ORDER — WARFARIN SODIUM 2.5 MG/1
5 TABLET ORAL DAILY
Qty: 60 TABLET | Refills: 3 | Status: ON HOLD | COMMUNITY
Start: 2020-04-08 | End: 2020-05-29

## 2020-04-21 ENCOUNTER — CARE COORDINATION (OUTPATIENT)
Dept: CARDIOLOGY | Facility: CLINIC | Age: 60
End: 2020-04-21

## 2020-04-24 ENCOUNTER — ANTICOAGULATION THERAPY VISIT (OUTPATIENT)
Dept: ANTICOAGULATION | Facility: CLINIC | Age: 60
End: 2020-04-24

## 2020-04-24 DIAGNOSIS — I50.9 HEART FAILURE (H): ICD-10-CM

## 2020-04-24 DIAGNOSIS — Z95.811 LVAD (LEFT VENTRICULAR ASSIST DEVICE) PRESENT (H): ICD-10-CM

## 2020-04-24 LAB — INR PPP: 2.4 (ref 0.9–1.1)

## 2020-04-24 NOTE — PROGRESS NOTES
ANTICOAGULATION FOLLOW-UP CLINIC VISIT    Patient Name:  Mariusz Sharp  Date:  2020  Contact Type:  Telephone    SUBJECTIVE:  Patient Findings         Clinical Outcomes     Negatives:   Major bleeding event, Thromboembolic event, Anticoagulation-related hospital admission, Anticoagulation-related ED visit, Anticoagulation-related fatality           OBJECTIVE    INR   Date Value Ref Range Status   2020 2.4 (A) 0.90 - 1.10 Final     Chromogenic Factor 10   Date Value Ref Range Status   2019 72 70 - 130 % Final     Comment:     Therapeutic Range:  A Chromogenic Factor 10 level of approximately 20-40%   inversely correlates with an INR of 2-3 for patients receiving Warfarin.   Chromogenic Factor 10 levels below 20% indicate an INR greater than 3 and   levels above 40% indicate an INR less than 2.         ASSESSMENT / PLAN  INR assessment THER    Recheck INR In: 4 WEEKS    INR Location Outside lab      Anticoagulation Summary  As of 2020    INR goal:   2.0-3.0   TTR:   76.9 % (1 y)   INR used for dosin.4 (2020)   Warfarin maintenance plan:   5 mg (2.5 mg x 2) every day   Full warfarin instructions:   5 mg every day   Weekly warfarin total:   35 mg   Plan last modified:   Sophy Griggs RN (2019)   Next INR check:   2020   Priority:   Critical   Target end date:   Indefinite    Indications    Heart failure (H) [I50.9]  LVAD (left ventricular assist device) present (H) [Z95.811]             Anticoagulation Episode Summary     INR check location:       Preferred lab:       Send INR reminders to:   [facility]    Comments:   LVAD implanted 18  3  ASA 81mg Daily Has 2.5mg tablets   [place] lab faw=[phone].  Faxed S.O there on 2019 Follow INR's only no Chromogenic X's      Anticoagulation Care Providers     Provider Role Specialty Phone number    Jenni Ortiz MD Referring Cardiology [phone]            See the Encounter Report to view  Anticoagulation Flowsheet and Dosing Calendar (Go to Encounters tab in chart review, and find the Anticoagulation Therapy Visit)    Spoke with patient. Gave them their lab results and new warfarin recommendation.  No changes in health, medication, or diet. No missed doses, no falls. No signs or symptoms of bleed or clotting.    Patient had LVAD placed on:  12/31/18  Type of LVAD: Heart mate 3  Patient's current Aspirin dose: 81mgs daily  LVAD Protocol followed:  Yes      Tom Soto RPH

## 2020-04-29 ENCOUNTER — TELEPHONE (OUTPATIENT)
Dept: TRANSPLANT | Facility: CLINIC | Age: 60
End: 2020-04-29

## 2020-04-29 VITALS — BODY MASS INDEX: 33.64 KG/M2 | WEIGHT: 196 LBS

## 2020-05-02 ENCOUNTER — CARE COORDINATION (OUTPATIENT)
Dept: CARDIOLOGY | Facility: CLINIC | Age: 60
End: 2020-05-02

## 2020-05-04 ENCOUNTER — TELEPHONE (OUTPATIENT)
Dept: TRANSPLANT | Facility: CLINIC | Age: 60
End: 2020-05-04

## 2020-05-04 NOTE — TELEPHONE ENCOUNTER
Patient Call: General  Route to LPN    Reason for call: Please connect with pt regarding his urology appt     Call back needed? Yes    Return Call Needed  Same as documented in contacts section  When to return call?: Greater than one day: Route standard priority

## 2020-05-04 NOTE — PROGRESS NOTES
D: Pt called to report sharp abdominal pain, which feels the same as his prior kidney stones. He believes he is likely passing another one. He states he has had kidney stones in the past. One was so big it required surgical intervention. He has some mild flank pain. He thinks his urine output has decreased, but he is still making urine. His urine is not bloody. No fevers, chills, sweats. He does not feel like he needs to go to the ED for pain control at this time.   I: Pt instructed to monitor his symptoms, report to ED if he stops urinating, develops flank pain, or has fever/chills/sweats. He was instructed to follow-up with urology team on Monday, and to call back with any changes or concerns.   A: Pt verbalized understanding.   P: Will continue to monitor. Plan of care per urology team.

## 2020-05-05 ENCOUNTER — CARE COORDINATION (OUTPATIENT)
Dept: CARDIOLOGY | Facility: CLINIC | Age: 60
End: 2020-05-05

## 2020-05-05 NOTE — PROGRESS NOTES
Patient reached out to Tx coordinator this morning regarding being seen in his local ED over the weekend for flank pain/kidney stones. Patient was evaluated, and per him, may need surgical intervention. Tx coordinator reached out to VAD Coordinator (writer) with this information. Urology team has not been in contact with either Tx or VAD team regarding plan of care. VAD Coordinator encouraged patient to call urology team with regards to his plan of care. Inbasket message sent to urology care coordinators, asking for VAD team to be in the loop with plan of care.

## 2020-05-05 NOTE — TELEPHONE ENCOUNTER
Patient Update 05/05/20:  Topic: Urology follow-up    Patient called transplant office as he was told that the urology office would be in touch with coordinator (unsure if it was VAD or tx coordinator urology was to get ahold of) and call Red back in regards to this.      Patient was evaluated in his local ER this weekend (see notes from on call coordinator) for flank pain.  Patient was evaluated and according to him, he may need surgical intervention for a stone that is present (?kidney stone).      Urology team had not been in contact with tx coordinator, will contact VAD team and further assess.  Spoke with Abraham, patient's VAD coordinator and will follow up with patient asap.

## 2020-05-06 ENCOUNTER — PRE VISIT (OUTPATIENT)
Dept: UROLOGY | Facility: CLINIC | Age: 60
End: 2020-05-06

## 2020-05-06 DIAGNOSIS — Z94.1 HEART REPLACED BY TRANSPLANT (H): Primary | ICD-10-CM

## 2020-05-06 NOTE — TELEPHONE ENCOUNTER
Chief Complaint : Return Phone    Hx/Sx: Stones    Records/Orders: Available     Pt Contacted: Called 05/06/20 at 2:07 PM and confirmed phone visit    At Rooming: Check In Mobile: [phone] (Preferred)

## 2020-05-08 ENCOUNTER — VIRTUAL VISIT (OUTPATIENT)
Dept: UROLOGY | Facility: CLINIC | Age: 60
End: 2020-05-08
Payer: COMMERCIAL

## 2020-05-08 ENCOUNTER — TELEPHONE (OUTPATIENT)
Dept: UROLOGY | Facility: CLINIC | Age: 60
End: 2020-05-08

## 2020-05-08 DIAGNOSIS — N20.2 RENAL AND URETERIC CALCULUS: Primary | ICD-10-CM

## 2020-05-08 PROBLEM — Z79.01 LONG TERM CURRENT USE OF ANTICOAGULANT: Status: ACTIVE | Noted: 2018-07-03

## 2020-05-08 PROBLEM — I23.6: Status: ACTIVE | Noted: 2018-07-06

## 2020-05-08 PROBLEM — E11.65 TYPE 2 DIABETES MELLITUS WITH HYPERGLYCEMIA (H): Status: ACTIVE | Noted: 2018-06-27

## 2020-05-08 PROBLEM — R31.9 HEMATURIA: Status: ACTIVE | Noted: 2018-07-04

## 2020-05-08 PROBLEM — E78.5 DYSLIPIDEMIA: Status: ACTIVE | Noted: 2018-06-27

## 2020-05-08 PROBLEM — I95.9 HYPOTENSION, UNSPECIFIED HYPOTENSION TYPE: Status: ACTIVE | Noted: 2018-07-02

## 2020-05-08 PROBLEM — N18.30 CKD (CHRONIC KIDNEY DISEASE) STAGE 3, GFR 30-59 ML/MIN (H): Status: ACTIVE | Noted: 2018-06-27

## 2020-05-08 PROBLEM — I47.29 MONOMORPHIC VENTRICULAR TACHYCARDIA (H): Status: ACTIVE | Noted: 2018-07-02

## 2020-05-08 PROBLEM — I21.02 ST ELEVATION MYOCARDIAL INFARCTION INVOLVING LEFT ANTERIOR DESCENDING (LAD) CORONARY ARTERY (H): Status: ACTIVE | Noted: 2018-06-27

## 2020-05-08 PROBLEM — R57.0 CARDIOGENIC SHOCK (H): Status: ACTIVE | Noted: 2018-07-02

## 2020-05-08 PROBLEM — I25.10 ASCVD (ARTERIOSCLEROTIC CARDIOVASCULAR DISEASE): Status: ACTIVE | Noted: 2018-06-27

## 2020-05-08 RX ORDER — BLOOD SUGAR DIAGNOSTIC
STRIP MISCELLANEOUS
COMMUNITY
Start: 2020-05-07 | End: 2020-06-22

## 2020-05-08 RX ORDER — TAMSULOSIN HYDROCHLORIDE 0.4 MG/1
0.4 CAPSULE ORAL DAILY
Status: ON HOLD | COMMUNITY
Start: 2020-05-06 | End: 2020-05-18

## 2020-05-08 RX ORDER — OXYCODONE HYDROCHLORIDE 5 MG/1
5 TABLET ORAL EVERY 4 HOURS PRN
Status: ON HOLD | COMMUNITY
Start: 2020-05-03 | End: 2020-05-29

## 2020-05-08 RX ORDER — PEN NEEDLE, DIABETIC 32GX 5/32"
NEEDLE, DISPOSABLE MISCELLANEOUS
COMMUNITY
Start: 2020-04-20 | End: 2020-06-05

## 2020-05-08 ASSESSMENT — PAIN SCALES - GENERAL: PAINLEVEL: NO PAIN (0)

## 2020-05-08 NOTE — NURSING NOTE
Chief Complaint   Patient presents with     Follow Up     Stones       There were no vitals taken for this visit. There is no height or weight on file to calculate BMI.    Patient Active Problem List   Diagnosis     Heart failure (H)     ICD (implantable cardioverter-defibrillator), single chamber- NOT dependent     Type 2 diabetes mellitus with hyperglycemia, with long-term current use of insulin (H)     Weakness     LVAD (left ventricular assist device) present (H)     Chronic systolic congestive heart failure (H)       No Known Allergies    Current Outpatient Medications   Medication Sig Dispense Refill     ACCU-CHEK [place] PLUS test strip        aspirin (ASA) 81 MG chewable tablet Take 1 tablet (81 mg) by mouth daily 90 tablet 3     BD SILVANA U/F 32G X 4 MM insulin pen needle        carvedilol (COREG) 12.5 MG tablet Take 2 tablets (25 mg) by mouth 2 times daily (with meals) TAKE 2 TABLETS (25mg) BY MOUTH EVERY MORNING AND 2 TABLETS (25mg) EVERY AFTERNOON 180 tablet 3     digoxin (LANOXIN) 125 MCG tablet Take 1 tablet (125 mcg) by mouth four times a week 90 tablet 3     famotidine (PEPCID) 20 MG tablet Take 1 tablet (20 mg) by mouth 2 times daily 180 tablet 3     hydrALAZINE (APRESOLINE) 50 MG tablet Take 1 tablet (50 mg) by mouth 3 times daily 180 tablet 5     insulin aspart (NOVOLOG PEN) 100 UNIT/ML pen Prandial Dosin units per 7 grams of carbohydrate each Meal or Snack.  Only chart total amount of units given.  Do not give if pre-prandial glucose is less than 60 mg/dL. If given at mealtime, administer within 30 minutes of start of meal. 3 mL 1     insulin glargine (LANTUS SOLOSTAR PEN) 100 UNIT/ML pen Inject 24 Units Subcutaneous At Bedtime 3 mL 11     losartan (COZAAR) 25 MG tablet Take 1 tablet (25 mg) by mouth daily 30 tablet 11     oxyCODONE (ROXICODONE) 5 MG tablet Take 5 mg by mouth       rosuvastatin (CRESTOR) 20 MG tablet Take 1 tablet (20 mg) by mouth At Bedtime 90 tablet 0     tamsulosin (FLOMAX)  0.4 MG capsule        warfarin ANTICOAGULANT (COUMADIN) 2.5 MG tablet Take 2 tablets daily or as directed by coumadin clinic. 60 tablet 3     insulin aspart (NOVOLOG PEN) 100 UNIT/ML pen Inject 1-10 Units Subcutaneous 3 times daily (before meals) Correction Scale - HIGH INSULIN RESISTANCE DOSING     -164 =1 units.   -189 =2.   -214 =3.   -239 =4.   -264 =5.   -289 =6.   -314 =7.   -339 =8.   -364 =9.  BG greater than or equal to 365 give 10 units  To be given with meal insulin, based on pre-meal BG 9 mL 0     insulin aspart (NOVOLOG PEN) 100 UNIT/ML pen Inject 1-7 Units Subcutaneous At Bedtime HIGH INSULIN RESISTANCE DOSING    Bedtime  For  - 224 give 1 units.   For  - 249 give 2 units.   For  - 274 give 3 units.   For  - 299 give 4 units.   For  - 324 give 5 units.   For  - 349 give 6 units.   For BG greater than or equal to 350 give 7 units. (Patient not taking: Reported on 2/11/2019) 2.1 mL 0     nitroGLYcerin (NITROSTAT) 0.4 MG sublingual tablet Place 1 tablet (0.4 mg) under the tongue every 5 minutes as needed for chest pain For chest pain place 1 tablet under the tongue every 5 minutes for 3 doses. If symptoms persist 5 minutes after 1st dose call 911. 10 tablet 0       Social History     Tobacco Use     Smoking status: Never Smoker     Smokeless tobacco: Never Used   Substance Use Topics     Alcohol use: No     Frequency: Never     Drug use: No       China Lira, EMT  5/8/2020  12:14 PM

## 2020-05-08 NOTE — TELEPHONE ENCOUNTER
Spoke with patient to confirm current medications, allergies, and pharmacy. Checked patient in and let them know Dr. Ibarra will be giving them a call at their scheduled appointment time. Patient stated understanding.      China Lira EMT

## 2020-05-08 NOTE — PATIENT INSTRUCTIONS
Schedule appointment for CT scan and telephone follow up with Dr. Ibarra in 3 weeks.    It was a pleasure meeting with you today.  Thank you for allowing me and my team the privilege of caring for you today.  YOU are the reason we are here, and I truly hope we provided you with the excellent service you deserve.  Please let us know if there is anything else we can do for you so that we can be sure you are leaving completely satisfied with your care experience.        Jeannette Lema, CMA

## 2020-05-08 NOTE — PROGRESS NOTES
"Mariusz Sharp is a 59 year old male who is being evaluated via a billable telephone visit.      The patient has been notified of following:     \"This telephone visit will be conducted via a call between you and your physician/provider. We have found that certain health care needs can be provided without the need for a physical exam.  This service lets us provide the care you need with a short phone conversation.  If a prescription is necessary we can send it directly to your pharmacy.  If lab work is needed we can place an order for that and you can then stop by our lab to have the test done at a later time.    Telephone visits are billed at different rates depending on your insurance coverage. During this emergency period, for some insurers they may be billed the same as an in-person visit.  Please reach out to your insurance provider with any questions.    If during the course of the call the physician/provider feels a telephone visit is not appropriate, you will not be charged for this service.\"    Patient has given verbal consent for Telephone visit?  Yes    What phone number would you like to be contacted at? [phone]    How would you like to obtain your AVS? Lucius     Patient with LVAD and known very large renal calculus (challenging for PCNL given need for anticoagulation).   Had [facility]   Noted to have two stones in the distal ureter (4mm and 6mm).   Patient notes minimal pain at present   We discussed MET for two smaller stones with repeat imaging   Discussed PCNL vs ureteroscopy x2-3 for large renal calculus on the left     Repeat imaging in three weeks   Follow up in three weeks       Phone call duration: 6 minutes    Megan Ibarra MD        "

## 2020-05-11 ENCOUNTER — ANTICOAGULATION THERAPY VISIT (OUTPATIENT)
Dept: ANTICOAGULATION | Facility: CLINIC | Age: 60
End: 2020-05-11

## 2020-05-11 DIAGNOSIS — Z95.811 LVAD (LEFT VENTRICULAR ASSIST DEVICE) PRESENT (H): ICD-10-CM

## 2020-05-11 DIAGNOSIS — I50.9 HEART FAILURE (H): ICD-10-CM

## 2020-05-11 LAB
ALBUMIN SERPL-MCNC: 4.1 G/DL
ALP SERPL-CCNC: 55 U/L
ALT SERPL-CCNC: 25 U/L
ANION GAP SERPL CALCULATED.3IONS-SCNC: 10 MMOL/L
AST SERPL-CCNC: 26 U/L
BASOPHILS # BLD AUTO: 0.06 K/UL
BASOPHILS NFR BLD AUTO: ABNORMAL %
BILIRUB SERPL-MCNC: 0.6 MG/DL
BUN SERPL-MCNC: 61 MG/DL
CALCIUM SERPL-MCNC: 8.7 MG/DL
CHLORIDE SERPLBLD-SCNC: 104 MMOL/L
CO2 SERPL-SCNC: 23 MMOL/L
CREAT SERPL-MCNC: 2.7 MG/DL
EOSINOPHIL COUNT (ABSOLUTE): 0.25 X10E3/UL
EOSINOPHIL NFR BLD AUTO: ABNORMAL %
ERYTHROCYTE [DISTWIDTH] IN BLOOD BY AUTOMATED COUNT: 15.3 %
GFR SERPL CREATININE-BSD FRML MDRD: 24 ML/MIN/1.73M2
GLUCOSE SERPL-MCNC: 172 MG/DL (ref 70–99)
HCT VFR BLD AUTO: 34 %
HEMOGLOBIN: 11.2 G/DL (ref 13.3–17.7)
IMMATURE GRANS (ABS): 0.02 X10E3/UL
INR PPP: 2.5 (ref 0.9–1.1)
LDH SERPL-CCNC: 139 U/L
LYMPHOCYTES # BLD AUTO: 1.16 K/UL
LYMPHOCYTES NFR BLD AUTO: ABNORMAL %
MCH RBC QN AUTO: 26.4 PG
MCHC RBC AUTO-ENTMCNC: 32.9 G/DL
MCV RBC AUTO: 80.2 FL
MONOCYTES # BLD AUTO: 0.63 K/UL
MONOCYTES NFR BLD AUTO: ABNORMAL %
NEUTROPHILS # BLD AUTO: 4.31 X10E3/UL
NEUTROPHILS NFR BLD AUTO: ABNORMAL %
PLATELET COUNT - QUEST: 293 10^9/L (ref 150–450)
POTASSIUM SERPL-SCNC: 5.1 MMOL/L
PROT SERPL-MCNC: 6 G/DL
RBC # BLD AUTO: 4.24 10^12/L
SODIUM SERPL-SCNC: 137 MMOL/L
WBC # BLD AUTO: 6.4 10^9/L

## 2020-05-11 NOTE — PROGRESS NOTES
ANTICOAGULATION FOLLOW-UP CLINIC VISIT    Patient Name:  Mariusz Sharp  Date:  2020  Contact Type:  Telephone    SUBJECTIVE:  Patient Findings     Positives:   Change in health (pt passed two kidney stones about a week ago.), Change in medications (PRN oxycodone for kidney stone pain..  Done taking that now)             OBJECTIVE    Recent labs: (last 7 days)     20   INR 2.5*       ASSESSMENT / PLAN  INR assessment THER    Recheck INR In: 4 WEEKS    INR Location Clinic      Anticoagulation Summary  As of 2020    INR goal:   2.0-3.0   TTR:   76.9 % (1 y)   INR used for dosin.5 (2020)   Warfarin maintenance plan:   5 mg (2.5 mg x 2) every day   Full warfarin instructions:   5 mg every day   Weekly warfarin total:   35 mg   Plan last modified:   Sophy Griggs RN (2019)   Next INR check:   2020   Priority:   Critical   Target end date:   Indefinite    Indications    Heart failure (H) [I50.9]  LVAD (left ventricular assist device) present (H) [Z95.811]             Anticoagulation Episode Summary     INR check location:       Preferred lab:       Send INR reminders to:   [facility]    Comments:   LVAD implanted 18 HM 3  ASA 81mg Daily Has 2.5mg tablets   [place] lab clh=[phone].  Faxed S.O there on 2019 Follow INR's only no Chromogenic X's      Anticoagulation Care Providers     Provider Role Specialty Phone number    Jenni Ortiz MD Referring Cardiology [phone]            See the Encounter Report to view Anticoagulation Flowsheet and Dosing Calendar (Go to Encounters tab in chart review, and find the Anticoagulation Therapy Visit)    Spoke with patient.  Patient had LVAD placed on:   18  Type of LVAD: HM3  Patient's current Aspirin dose: 81mg  LVAD Protocol followed:  Yes  Nasreen Kathleen RN

## 2020-05-12 ENCOUNTER — APPOINTMENT (OUTPATIENT)
Dept: CT IMAGING | Facility: CLINIC | Age: 60
End: 2020-05-12
Attending: INTERNAL MEDICINE
Payer: COMMERCIAL

## 2020-05-12 ENCOUNTER — DOCUMENTATION ONLY (OUTPATIENT)
Dept: CARDIOLOGY | Facility: CLINIC | Age: 60
End: 2020-05-12

## 2020-05-12 ENCOUNTER — CARE COORDINATION (OUTPATIENT)
Dept: CARDIOLOGY | Facility: CLINIC | Age: 60
End: 2020-05-12

## 2020-05-12 ENCOUNTER — HOSPITAL ENCOUNTER (INPATIENT)
Facility: CLINIC | Age: 60
LOS: 3 days | Discharge: HOME | End: 2020-05-15
Attending: INTERNAL MEDICINE | Admitting: INTERNAL MEDICINE
Payer: COMMERCIAL

## 2020-05-12 DIAGNOSIS — N13.30 HYDRONEPHROSIS, UNSPECIFIED HYDRONEPHROSIS TYPE: Primary | ICD-10-CM

## 2020-05-12 LAB
ALBUMIN UR-MCNC: 10 MG/DL
ANION GAP SERPL CALCULATED.3IONS-SCNC: 7 MMOL/L (ref 3–14)
APPEARANCE UR: CLEAR
BILIRUB UR QL STRIP: NEGATIVE
BUN SERPL-MCNC: 46 MG/DL (ref 7–30)
CALCIUM SERPL-MCNC: 8.6 MG/DL (ref 8.5–10.1)
CHLORIDE SERPL-SCNC: 107 MMOL/L (ref 94–109)
CO2 SERPL-SCNC: 24 MMOL/L (ref 20–32)
COLOR UR AUTO: YELLOW
CREAT SERPL-MCNC: 2.16 MG/DL (ref 0.66–1.25)
ERYTHROCYTE [DISTWIDTH] IN BLOOD BY AUTOMATED COUNT: 15.8 % (ref 10–15)
GFR SERPL CREATININE-BSD FRML MDRD: 33 ML/MIN/{1.73_M2}
GLUCOSE BLDC GLUCOMTR-MCNC: 162 MG/DL (ref 70–99)
GLUCOSE SERPL-MCNC: 174 MG/DL (ref 70–99)
GLUCOSE UR STRIP-MCNC: 150 MG/DL
HBA1C MFR BLD: 8 % (ref 0–5.6)
HCT VFR BLD AUTO: 36.4 % (ref 40–53)
HGB BLD-MCNC: 11.1 G/DL (ref 13.3–17.7)
HGB UR QL STRIP: ABNORMAL
INR PPP: 2.47 (ref 0.86–1.14)
KETONES UR STRIP-MCNC: NEGATIVE MG/DL
LEUKOCYTE ESTERASE UR QL STRIP: NEGATIVE
MCH RBC QN AUTO: 25.6 PG (ref 26.5–33)
MCHC RBC AUTO-ENTMCNC: 30.5 G/DL (ref 31.5–36.5)
MCV RBC AUTO: 84 FL (ref 78–100)
NITRATE UR QL: NEGATIVE
PH UR STRIP: 5 PH (ref 5–7)
PLATELET # BLD AUTO: 289 10E9/L (ref 150–450)
POTASSIUM SERPL-SCNC: 4.6 MMOL/L (ref 3.4–5.3)
RBC # BLD AUTO: 4.33 10E12/L (ref 4.4–5.9)
RBC #/AREA URNS AUTO: 75 /HPF (ref 0–2)
SODIUM SERPL-SCNC: 138 MMOL/L (ref 133–144)
SOURCE: ABNORMAL
SP GR UR STRIP: 1.02 (ref 1–1.03)
UROBILINOGEN UR STRIP-MCNC: NORMAL MG/DL (ref 0–2)
WBC # BLD AUTO: 6.4 10E9/L (ref 4–11)
WBC #/AREA URNS AUTO: 3 /HPF (ref 0–5)

## 2020-05-12 PROCEDURE — 40000141 ZZH STATISTIC PERIPHERAL IV START W/O US GUIDANCE

## 2020-05-12 PROCEDURE — 85027 COMPLETE CBC AUTOMATED: CPT | Performed by: STUDENT IN AN ORGANIZED HEALTH CARE EDUCATION/TRAINING PROGRAM

## 2020-05-12 PROCEDURE — 74176 CT ABD & PELVIS W/O CONTRAST: CPT

## 2020-05-12 PROCEDURE — 36415 COLL VENOUS BLD VENIPUNCTURE: CPT | Performed by: STUDENT IN AN ORGANIZED HEALTH CARE EDUCATION/TRAINING PROGRAM

## 2020-05-12 PROCEDURE — 21400000 ZZH R&B CCU UMMC

## 2020-05-12 PROCEDURE — 99221 1ST HOSP IP/OBS SF/LOW 40: CPT | Mod: AI | Performed by: INTERNAL MEDICINE

## 2020-05-12 PROCEDURE — 81001 URINALYSIS AUTO W/SCOPE: CPT | Performed by: STUDENT IN AN ORGANIZED HEALTH CARE EDUCATION/TRAINING PROGRAM

## 2020-05-12 PROCEDURE — 85610 PROTHROMBIN TIME: CPT | Performed by: STUDENT IN AN ORGANIZED HEALTH CARE EDUCATION/TRAINING PROGRAM

## 2020-05-12 PROCEDURE — 80048 BASIC METABOLIC PNL TOTAL CA: CPT | Performed by: STUDENT IN AN ORGANIZED HEALTH CARE EDUCATION/TRAINING PROGRAM

## 2020-05-12 PROCEDURE — 83036 HEMOGLOBIN GLYCOSYLATED A1C: CPT | Performed by: STUDENT IN AN ORGANIZED HEALTH CARE EDUCATION/TRAINING PROGRAM

## 2020-05-12 PROCEDURE — 25000131 ZZH RX MED GY IP 250 OP 636 PS 637: Performed by: STUDENT IN AN ORGANIZED HEALTH CARE EDUCATION/TRAINING PROGRAM

## 2020-05-12 PROCEDURE — 00000146 ZZHCL STATISTIC GLUCOSE BY METER IP

## 2020-05-12 PROCEDURE — 87040 BLOOD CULTURE FOR BACTERIA: CPT | Performed by: STUDENT IN AN ORGANIZED HEALTH CARE EDUCATION/TRAINING PROGRAM

## 2020-05-12 RX ORDER — DEXTROSE MONOHYDRATE 25 G/50ML
25-50 INJECTION, SOLUTION INTRAVENOUS
Status: DISCONTINUED | OUTPATIENT
Start: 2020-05-12 | End: 2020-05-15 | Stop reason: HOSPADM

## 2020-05-12 RX ORDER — ROSUVASTATIN CALCIUM 20 MG/1
20 TABLET, COATED ORAL AT BEDTIME
Status: DISCONTINUED | OUTPATIENT
Start: 2020-05-12 | End: 2020-05-15 | Stop reason: HOSPADM

## 2020-05-12 RX ORDER — AMOXICILLIN 250 MG
1 CAPSULE ORAL 2 TIMES DAILY
Status: DISCONTINUED | OUTPATIENT
Start: 2020-05-12 | End: 2020-05-15 | Stop reason: HOSPADM

## 2020-05-12 RX ORDER — ASPIRIN 81 MG/1
81 TABLET, CHEWABLE ORAL DAILY
Status: DISCONTINUED | OUTPATIENT
Start: 2020-05-13 | End: 2020-05-15 | Stop reason: HOSPADM

## 2020-05-12 RX ORDER — NALOXONE HYDROCHLORIDE 0.4 MG/ML
.1-.4 INJECTION, SOLUTION INTRAMUSCULAR; INTRAVENOUS; SUBCUTANEOUS
Status: DISCONTINUED | OUTPATIENT
Start: 2020-05-12 | End: 2020-05-15 | Stop reason: HOSPADM

## 2020-05-12 RX ORDER — CARVEDILOL 25 MG/1
25 TABLET ORAL 2 TIMES DAILY WITH MEALS
Status: DISCONTINUED | OUTPATIENT
Start: 2020-05-12 | End: 2020-05-15 | Stop reason: HOSPADM

## 2020-05-12 RX ORDER — HYDRALAZINE HYDROCHLORIDE 50 MG/1
50 TABLET, FILM COATED ORAL 3 TIMES DAILY
Status: DISCONTINUED | OUTPATIENT
Start: 2020-05-12 | End: 2020-05-15 | Stop reason: HOSPADM

## 2020-05-12 RX ORDER — NICOTINE POLACRILEX 4 MG
15-30 LOZENGE BUCCAL
Status: DISCONTINUED | OUTPATIENT
Start: 2020-05-12 | End: 2020-05-15 | Stop reason: HOSPADM

## 2020-05-12 RX ORDER — FAMOTIDINE 20 MG/1
20 TABLET, FILM COATED ORAL DAILY
Status: DISCONTINUED | OUTPATIENT
Start: 2020-05-13 | End: 2020-05-13

## 2020-05-12 RX ORDER — TAMSULOSIN HYDROCHLORIDE 0.4 MG/1
0.4 CAPSULE ORAL DAILY
Status: DISCONTINUED | OUTPATIENT
Start: 2020-05-13 | End: 2020-05-15 | Stop reason: HOSPADM

## 2020-05-12 RX ORDER — LIDOCAINE 40 MG/G
CREAM TOPICAL
Status: DISCONTINUED | OUTPATIENT
Start: 2020-05-12 | End: 2020-05-15 | Stop reason: HOSPADM

## 2020-05-12 RX ORDER — AMOXICILLIN 250 MG
2 CAPSULE ORAL 2 TIMES DAILY
Status: DISCONTINUED | OUTPATIENT
Start: 2020-05-12 | End: 2020-05-15 | Stop reason: HOSPADM

## 2020-05-12 RX ORDER — POLYETHYLENE GLYCOL 3350 17 G/17G
17 POWDER, FOR SOLUTION ORAL DAILY PRN
Status: DISCONTINUED | OUTPATIENT
Start: 2020-05-12 | End: 2020-05-15 | Stop reason: HOSPADM

## 2020-05-12 RX ORDER — DIGOXIN 125 MCG
125 TABLET ORAL
Status: DISCONTINUED | OUTPATIENT
Start: 2020-05-14 | End: 2020-05-13

## 2020-05-12 RX ADMIN — INSULIN GLARGINE 24 UNITS: 100 INJECTION, SOLUTION SUBCUTANEOUS at 23:39

## 2020-05-12 ASSESSMENT — ACTIVITIES OF DAILY LIVING (ADL)
SWALLOWING: 0-->SWALLOWS FOODS/LIQUIDS WITHOUT DIFFICULTY
BATHING: 0-->INDEPENDENT
TOILETING: 0-->INDEPENDENT
AMBULATION: 0-->INDEPENDENT
COGNITION: 0 - NO COGNITION ISSUES REPORTED
TRANSFERRING: 0-->INDEPENDENT
FALL_HISTORY_WITHIN_LAST_SIX_MONTHS: NO
DRESS: 0-->INDEPENDENT
RETIRED_EATING: 0-->INDEPENDENT
RETIRED_COMMUNICATION: 0-->UNDERSTANDS/COMMUNICATES WITHOUT DIFFICULTY

## 2020-05-12 ASSESSMENT — MIFFLIN-ST. JEOR: SCORE: 1625.05

## 2020-05-12 NOTE — PROGRESS NOTES
D: Patient was seen in his local ED and was found to have a kidney stone. Pt got repeat labs yesterday.  I/A: On 5/11, Creatinine was 2.7, which is higher than patient's baseline. Labs were discussed with Dr. Ortiz. Per Dr. Ortiz, patient should be directly admitted to hospital.  Discussed with patient and patient agreed to come to [facility].   Dr. Ortiz will give report to Dr. Sánchez with Cards 2. Writer gave report to  RN.   P: Pt will be directly admitted to [address] [place] [address].    Instructed pt to call VAD Coordinator on-call if he has any questions; pt verbalized understanding.

## 2020-05-13 LAB
ANION GAP SERPL CALCULATED.3IONS-SCNC: 6 MMOL/L (ref 3–14)
BUN SERPL-MCNC: 42 MG/DL (ref 7–30)
CALCIUM SERPL-MCNC: 8.9 MG/DL (ref 8.5–10.1)
CHLORIDE SERPL-SCNC: 110 MMOL/L (ref 94–109)
CO2 SERPL-SCNC: 23 MMOL/L (ref 20–32)
CREAT SERPL-MCNC: 2.04 MG/DL (ref 0.66–1.25)
ERYTHROCYTE [DISTWIDTH] IN BLOOD BY AUTOMATED COUNT: 15.7 % (ref 10–15)
GFR SERPL CREATININE-BSD FRML MDRD: 35 ML/MIN/{1.73_M2}
GLUCOSE BLDC GLUCOMTR-MCNC: 118 MG/DL (ref 70–99)
GLUCOSE BLDC GLUCOMTR-MCNC: 150 MG/DL (ref 70–99)
GLUCOSE BLDC GLUCOMTR-MCNC: 153 MG/DL (ref 70–99)
GLUCOSE SERPL-MCNC: 128 MG/DL (ref 70–99)
HCT VFR BLD AUTO: 36.7 % (ref 40–53)
HGB BLD-MCNC: 11 G/DL (ref 13.3–17.7)
INR PPP: 2.88 (ref 0.86–1.14)
MCH RBC QN AUTO: 25.2 PG (ref 26.5–33)
MCHC RBC AUTO-ENTMCNC: 30 G/DL (ref 31.5–36.5)
MCV RBC AUTO: 84 FL (ref 78–100)
PLATELET # BLD AUTO: 281 10E9/L (ref 150–450)
POTASSIUM SERPL-SCNC: 4.4 MMOL/L (ref 3.4–5.3)
RBC # BLD AUTO: 4.36 10E12/L (ref 4.4–5.9)
SODIUM SERPL-SCNC: 139 MMOL/L (ref 133–144)
WBC # BLD AUTO: 6.4 10E9/L (ref 4–11)

## 2020-05-13 PROCEDURE — 00000146 ZZHCL STATISTIC GLUCOSE BY METER IP

## 2020-05-13 PROCEDURE — 85027 COMPLETE CBC AUTOMATED: CPT | Performed by: STUDENT IN AN ORGANIZED HEALTH CARE EDUCATION/TRAINING PROGRAM

## 2020-05-13 PROCEDURE — 99232 SBSQ HOSP IP/OBS MODERATE 35: CPT | Mod: GC | Performed by: INTERNAL MEDICINE

## 2020-05-13 PROCEDURE — 25000132 ZZH RX MED GY IP 250 OP 250 PS 637: Performed by: STUDENT IN AN ORGANIZED HEALTH CARE EDUCATION/TRAINING PROGRAM

## 2020-05-13 PROCEDURE — 80048 BASIC METABOLIC PNL TOTAL CA: CPT | Performed by: STUDENT IN AN ORGANIZED HEALTH CARE EDUCATION/TRAINING PROGRAM

## 2020-05-13 PROCEDURE — 85610 PROTHROMBIN TIME: CPT | Performed by: STUDENT IN AN ORGANIZED HEALTH CARE EDUCATION/TRAINING PROGRAM

## 2020-05-13 PROCEDURE — 36415 COLL VENOUS BLD VENIPUNCTURE: CPT | Performed by: STUDENT IN AN ORGANIZED HEALTH CARE EDUCATION/TRAINING PROGRAM

## 2020-05-13 PROCEDURE — 21400000 ZZH R&B CCU UMMC

## 2020-05-13 RX ORDER — WARFARIN SODIUM 3 MG/1
3 TABLET ORAL
Status: COMPLETED | OUTPATIENT
Start: 2020-05-13 | End: 2020-05-13

## 2020-05-13 RX ORDER — FAMOTIDINE 20 MG/1
20 TABLET, FILM COATED ORAL 2 TIMES DAILY
Status: DISCONTINUED | OUTPATIENT
Start: 2020-05-14 | End: 2020-05-15 | Stop reason: HOSPADM

## 2020-05-13 RX ADMIN — CARVEDILOL 25 MG: 25 TABLET, FILM COATED ORAL at 18:12

## 2020-05-13 RX ADMIN — CARVEDILOL 25 MG: 25 TABLET, FILM COATED ORAL at 08:28

## 2020-05-13 RX ADMIN — TAMSULOSIN HYDROCHLORIDE 0.4 MG: 0.4 CAPSULE ORAL at 08:28

## 2020-05-13 RX ADMIN — WARFARIN SODIUM 3 MG: 3 TABLET ORAL at 19:54

## 2020-05-13 RX ADMIN — FAMOTIDINE 20 MG: 20 TABLET ORAL at 08:28

## 2020-05-13 RX ADMIN — HYDRALAZINE HYDROCHLORIDE 50 MG: 50 TABLET, FILM COATED ORAL at 14:48

## 2020-05-13 RX ADMIN — ROSUVASTATIN CALCIUM 20 MG: 20 TABLET, FILM COATED ORAL at 21:18

## 2020-05-13 RX ADMIN — ASPIRIN 81 MG CHEWABLE TABLET 81 MG: 81 TABLET CHEWABLE at 08:28

## 2020-05-13 RX ADMIN — HYDRALAZINE HYDROCHLORIDE 50 MG: 50 TABLET, FILM COATED ORAL at 19:52

## 2020-05-13 RX ADMIN — HYDRALAZINE HYDROCHLORIDE 50 MG: 50 TABLET, FILM COATED ORAL at 08:28

## 2020-05-13 ASSESSMENT — ACTIVITIES OF DAILY LIVING (ADL)
ADLS_ACUITY_SCORE: 11

## 2020-05-13 ASSESSMENT — MIFFLIN-ST. JEOR: SCORE: 1624.14

## 2020-05-13 NOTE — PLAN OF CARE
Admission  D-Heartmate 3 LVAD pt admitted from home for elevated creatinine. Recent kidney stone. Alert and oriented X 4. Independent.  I-Oriented to bedside controls and unit routines. Admission profile completed. MD here to assess pt. Order to hold warfarin. Pt took dose prior to admission. MD notified. Labs drawn. Urine sent to lab.Obtained piv. 2 RN skin assessment completed by myself and NUL, CK, RN.  A-Telemetry shows SR. Denies pain.  P-NPO after midnight. Renal ultrasound, abdominal ct, and urology consult ordered. Pt is aware of the plan. Monitor labs.

## 2020-05-13 NOTE — PROGRESS NOTES
Cardiology Progress Note  5/13/2020      ASSESSMENT/PLAN:  59yoM w/ hx of CAD c/b ICMP, HFrEF s/p LVAD, CKDIII, admitted for nephrolithiasis c/b hydronephrosis and ANDREW     # ANDREW on CKD III  # Nephrolithiasis c/b hydroureter and hydronephrosis, improving  Passed two small stones seen on CT on 5/3. Discussed with urology regarding PCNL vs ureterscopy. Pt currently has no pain, ANDREW improving since 5/11 and CT showed interval improvement of hydronephrosis and resolution of hydroureter. Discussed with urology, no plan for intervention this hospitalization on his large non-obstructing stone. However, also discussed the possibility of prophylactic ureteral stent as we are concerned how this will affect his transplant status and risk for renal failure post-op  - urology consult, appreciate the assistance  - CT abd/pelv done  - UA/UC  - Restart warfarin since no plan for intervention this hospitalization (3mg tonight per team pharmacist)  - trend BMP     # CAD s/p ALYSSA  # ICMP  # HFrEF 20-25% s/p LVAD   LVAD since 2018, has been on warfarin. INR 2.47 on admission. No VAD alarms outside of PI events which are typical for him.  ACEi/ARB - Not on PTA  Beta blocker - Continue PTA Coreg  Aldosterone antagonist - Not on PTA  SCD prophylaxis - ICD  Fluid status - Euvolemic  Afterload - Continue pta hydral  Hold digoxin given renal function until digoxin level returns.     # HTN   Normotensive since presentation   - pta hydralazine      # T2DM  Insulin dependent, takes 35U lantus at night w/ carb adjustment (1U for 7g CHO)  - lantus decrease to 24U  - medium sliding scale insulin  - Meal coverage insuline  - hypoglycemic protocol      FEN: none  DVT Prophylaxis:  Warfarin  GI Prophylaxis: pta famotidine  Disposition: cards 2, dispo pending resolution of ANDREW and urology procedures  Code Status: full     Plan d/w Dr. Gonzalo M.D., who is in agreement. Please, see staff addendum for changes/additions to the plan.    Josue Foley  "MD  Internal Medicine PGY3  [phone]      ===================================================================    SUBJECTIVE: GIL o/n. Feels well. No fevers, chills. No flank pain. Denies any further hematuria. No complaints this morning.    Nursing notes reviewed.    OBJECTIVE:  BP (!) 78/61 (BP Location: Right arm)   Temp 97.9  F (36.6  C) (Oral)   Resp 16   Ht 1.626 m (5' 4\")   Wt 88.8 kg (195 lb 12.8 oz)   SpO2 95%   BMI 33.61 kg/m    Temp (24hrs), Av.4  F (36.9  C), Min:97.9  F (36.6  C), Max:98.7  F (37.1  C)      I/O:    Intake/Output Summary (Last 24 hours) at 2020 1338  Last data filed at 2020 1300  Gross per 24 hour   Intake 240 ml   Output 675 ml   Net -435 ml       Wt:   Wt Readings from Last 5 Encounters:   20 88.8 kg (195 lb 12.8 oz)   20 88.9 kg (196 lb)   19 91.3 kg (201 lb 4.8 oz)   19 94.2 kg (207 lb 11.2 oz)   19 94.8 kg (209 lb)       GEN: pleasant, no acute distress  HEENT: no icterus  CV: LVAD hum. JVP not elevated  CHEST: clear to ausculation bilaterally, no rales or wheezing  ABD: soft, non-tender, normal active bowel sounds  EXTR: WWP. No clubbing, cyanosis or edema.   NEURO: alert oriented, speech fluent/appropriate, motor grossly nonfocal    Labs: reviewed in EMR  CBC  Recent Labs   Lab 20  0523 20  2215 20   WBC 6.4 6.4 6.4   RBC 4.36* 4.33* 4.24   HGB 11.0* 11.1* 11.2*   HCT 36.7* 36.4* 34   MCV 84 84 80.2   MCH 25.2* 25.6* 26.4   MCHC 30.0* 30.5* 32.9   RDW 15.7* 15.8* 15.3    289 293     BMP  Recent Labs   Lab 20  0523 20  2215 20    138 137   POTASSIUM 4.4 4.6 5.1   CHLORIDE 110* 107 104   CO2 23 24 23   ANIONGAP 6 7 10   * 174* 172*   BUN 42* 46* 61   CR 2.04* 2.16* 2.7   GFRESTIMATED 35* 33* 24   GFRESTBLACK 41* 38*  --    ARLENE 8.9 8.6 8.7     Troponins:     Lab Results   Component Value Date    TROPI 0.050 (H) 2018    TROPI 1.460 (HH) 2018     INR  Recent Labs "   Lab 05/13/20  0523 05/12/20  2215 05/11/20   INR 2.88* 2.47* 2.5*       Imaging: reviewed in EMR.      I have reviewed today's vital signs, notes, medications, labs and imaging.  I have also seen and examined the patient and agree with the findings and plan as outlined above.  PT without new complaints. 98.2, HR 55, RR 16 and BP 90 with flow 3.4, PI 7.9, speed 5100 and Pwr 3.8.  Lungs clear and mechanical LVAD hum. Labs with cr 2.04 and WBC 6.4.  Assessment: Pt with nephrolithiasis and left renal stone.  Await plan from urology.  Continue to follow renal fn.     Hugo Sánchez MD, PhD  Professor, Heart Failure and Cardiac Transplantation  [facility]

## 2020-05-13 NOTE — H&P
Medicine H&P   Mariusz Sharp ([id number]) admitted on 5/12/2020  Primary care provider: He, Like         Chief Complaint:     nephrolithiasis         History of Present Illness     Mariusz Sharp is a 59 year old male with history of CAD (s/p STEMI with ALYSSA to LAD 6/27/18), LV thrombus, CKD Stage III, PAF, DM Type II, and ICM with EF 20-25% s/p HeartMate 3 LVAD implant 12/31/18 presented as direct admit for nephrolithiasis c/b hydronephrosis and ANDREW  Pt had acute L flank pain and presented to ED on 5/3 and was found to have 2 stones in the L ureter and modoerate L hydronephrosis and hydroureter. Pt also had a very large stone in the L collecting system that measured 26mm (doubled since 2017). He was prescribed flomax to pass the small stones and followed up with urology via virtual visit on 5/8, during which urology discussed with him regarding MET for the two smaller stones and possible PCNL vs ureteroscopy for the large stone. On 5/11, as part of txp workup, he was found to have ANDREW w/ cr of 2.7 from baseline of 1.7 and was directed admitted to the hospital for further workup    Pt is currently pain free. Reports that he thinks he has passed the two small stones. Denies antonieta blood in urine or pink tinged urine. No dysuria. Denies fever/chills/abd pain/GI sxs. Has been compliant on warfarin, most recent dose was 9pm on 5/12.          Other Hx   PMHx    Patient Active Problem List   Diagnosis     Heart failure (H)     ICD (implantable cardioverter-defibrillator), single chamber- NOT dependent     Type 2 diabetes mellitus with hyperglycemia, with long-term current use of insulin (H)     Weakness     LVAD (left ventricular assist device) present (H)     Chronic systolic congestive heart failure (H)     ASCVD (arteriosclerotic cardiovascular disease)     ANDREW (acute kidney injury) (H)     Cardiogenic shock (H)     CKD (chronic kidney disease) stage 3, GFR 30-59 ml/min (H)     Dyslipidemia     Hypotension,  unspecified hypotension type     Hematuria     Left ventricular apical thrombus following MI (H)     Long term current use of anticoagulant     Monomorphic ventricular tachycardia (H)     ST elevation myocardial infarction involving left anterior descending (LAD) coronary artery (H)     Type 2 diabetes mellitus with hyperglycemia (H)     Unilateral inguinal hernia     Ureteral obstruction     Hydronephrosis       PSx    Past Surgical History:   Procedure Laterality Date     COLONOSCOPY N/A 12/7/2018    Procedure: COLONOSCOPY;  Surgeon: Krish Mercado MD;  Location: [facility] OR     CV RIGHT HEART CATH N/A 2/19/2019    Procedure: RHC;  Surgeon: Brian Long MD;  Location: [facility] HEART [facility]ARDIAC [facility]ATH LAB     CV RIGHT HEART CATH N/A 7/5/2019    Procedure: CV RIGHT HEART CATH;  Surgeon: Khris Mcclelland MD;  Location:  HEART CARDIAC CATH LAB     HC PRQ TRLUML CORONARY ANGIOPLASTY ADDL BRANCH      PCI and ALYSSA to ostial LAD on 6/27/18     IMPLANT AUTOMATIC IMPLANTABLE CARDIOVERTER DEFIBRILLATOR       INSERT VENTRICULAR ASSIST DEVICE LEFT (HEARTMATE II) N/A 12/31/2018    Procedure: Median Sternotomy, On Cardiopulmonary Bypass Pump, Insertion of Left Ventricular Assist Device (Heartmate III);  Surgeon: Kamaljit Baker MD;  Location: [facility] OR       Medications  No current facility-administered medications on file prior to encounter.   ACCU-CHEK [place]TE PLUS test strip,   aspirin (ASA) 81 MG chewable tablet, Take 1 tablet (81 mg) by mouth daily  BD SILVANA U/F 32G X 4 MM insulin pen needle,   carvedilol (COREG) 12.5 MG tablet, Take 2 tablets (25 mg) by mouth 2 times daily (with meals) TAKE 2 TABLETS (25mg) BY MOUTH EVERY MORNING AND 2 TABLETS (25mg) EVERY AFTERNOON  digoxin (LANOXIN) 125 MCG tablet, Take 1 tablet (125 mcg) by mouth four times a week  famotidine (PEPCID) 20 MG tablet, Take 1 tablet (20 mg) by mouth 2 times daily  hydrALAZINE (APRESOLINE) 50 MG tablet, Take 1 tablet (50 mg) by mouth 3 times daily  insulin aspart  (NOVOLOG PEN) 100 UNIT/ML pen, Prandial Dosin units per 7 grams of carbohydrate each Meal or Snack.  Only chart total amount of units given.  Do not give if pre-prandial glucose is less than 60 mg/dL. If given at mealtime, administer within 30 minutes of start of meal.  insulin aspart (NOVOLOG PEN) 100 UNIT/ML pen, Inject 1-10 Units Subcutaneous 3 times daily (before meals) Correction Scale - HIGH INSULIN RESISTANCE DOSING     -164 =1 units.   -189 =2.   -214 =3.   -239 =4.   -264 =5.   -289 =6.   -314 =7.   -339 =8.   -364 =9.  BG greater than or equal to 365 give 10 units  To be given with meal insulin, based on pre-meal BG  insulin aspart (NOVOLOG PEN) 100 UNIT/ML pen, Inject 1-7 Units Subcutaneous At Bedtime HIGH INSULIN RESISTANCE DOSING    Bedtime  For  - 224 give 1 units.   For  - 249 give 2 units.   For  - 274 give 3 units.   For  - 299 give 4 units.   For  - 324 give 5 units.   For  - 349 give 6 units.   For BG greater than or equal to 350 give 7 units. (Patient not taking: Reported on 2019)  insulin glargine (LANTUS SOLOSTAR PEN) 100 UNIT/ML pen, Inject 24 Units Subcutaneous At Bedtime  losartan (COZAAR) 25 MG tablet, Take 1 tablet (25 mg) by mouth daily  nitroGLYcerin (NITROSTAT) 0.4 MG sublingual tablet, Place 1 tablet (0.4 mg) under the tongue every 5 minutes as needed for chest pain For chest pain place 1 tablet under the tongue every 5 minutes for 3 doses. If symptoms persist 5 minutes after 1st dose call 911.  oxyCODONE (ROXICODONE) 5 MG tablet, Take 5 mg by mouth  rosuvastatin (CRESTOR) 20 MG tablet, Take 1 tablet (20 mg) by mouth At Bedtime  tamsulosin (FLOMAX) 0.4 MG capsule,   warfarin ANTICOAGULANT (COUMADIN) 2.5 MG tablet, Take 2 tablets daily or as directed by coumadin clinic.        Allergy  No Known Allergies    Family Hx  No family history on file.    Social Hx  Social History  "    Socioeconomic History     Marital status: Single     Spouse name: Not on file     Number of children: Not on file     Years of education: Not on file     Highest education level: Not on file   Occupational History     Not on file   Social Needs     Financial resource strain: Not on file     Food insecurity     Worry: Not on file     Inability: Not on file     Transportation needs     Medical: Not on file     Non-medical: Not on file   Tobacco Use     Smoking status: Never Smoker     Smokeless tobacco: Never Used   Substance and Sexual Activity     Alcohol use: No     Frequency: Never     Drug use: No     Sexual activity: Not on file   Lifestyle     Physical activity     Days per week: Not on file     Minutes per session: Not on file     Stress: Not on file   Relationships     Social connections     Talks on phone: Not on file     Gets together: Not on file     Attends Anabaptist service: Not on file     Active member of club or organization: Not on file     Attends meetings of clubs or organizations: Not on file     Relationship status: Not on file     Intimate partner violence     Fear of current or ex partner: Not on file     Emotionally abused: Not on file     Physically abused: Not on file     Forced sexual activity: Not on file   Other Topics Concern     Not on file   Social History Narrative     Not on file              Vitals and Exam/ Objectives     Physical exam:  /83 (BP Location: Left arm)   Temp 98.7  F (37.1  C) (Oral)   Resp 16   Ht 1.626 m (5' 4\")   Wt 88.9 kg (196 lb)   SpO2 99%   BMI 33.64 kg/m    Wt Readings from Last 2 Encounters:   05/12/20 88.9 kg (196 lb)   04/29/20 88.9 kg (196 lb)     No intake or output data in the 24 hours ending 05/12/20 2205    Gen: lying in bed, NAD  HEENT: sclera anicteric   CV: mechanical VAD sounds  Pulm: normal resp efforts, no wheezing/crackles  Abd: obese, nontender, +BS  : no CVA tenderness, no suprapubic tenderness  Ext: no LE edema  Neuro:AOX4, " nonfocal    Imaging/procedure results:  No results found for this or any previous visit (from the past 48 hour(s)).    CT abd/pelv:   1. Increased size of the inferior left pelvocalyceal stone measuring 22 x 16 x 30 mm. No hydronephrosis.  2. Cholelithiasis without evidence of cholecystitis.          Assessment and Plans   59yoM w/ hx of CAD c/b ICMP, HFrEF s/p LVAD, CKDIII, admitted for nephrolithiasis c/b hydronephrosis and ANDREW     # ANDREW on CKD III  # Nephrolithiasis c/b hydroureter and hydronephrosis   Passed two small stones seen on CT on 5/3. Discussed with urology regarding PCNL vs ureterscopy. Pt currently has no pain, ANDREW improving since 5/11 and CT showed interval improvement of hydronephrosis and resolution of hydroureter. Suspect stone is pressure is partially relieved by flomax that was started on 5/3. Given the size of the stone, will consult urology regarding next steps  - urology consult  - CT abd/pelv done  - UA/UC  - NPO  - hold warfarin (see below)  - trend BMP    # CAD s/p ALYSSA  # ICMP  # HFrEF 20-25% s/p LVAD   LVAD since 2018, has been on warfarin. INR 2.47 on admission. Holding warfarin for now with anticipation of procedure  - holding warfarin   - c/w pta coreg, ASA, atorvastatin, dig    # HTN   Normotensive since presentation   - pta hydralazine     # T2DM  Insulin dependent, takes 35U lantus at night w/ carb adjustment (1U for 7g CHO)  - lantus decrease to 24U while NPO  - medium sliding scale insulin  - hypoglycemic protocol     FEN: none  DVT Prophylaxis:  Holding warfarin   GI Prophylaxis: pta famotidine  Disposition: cards 2, dispo pending resolution of ANDREW and urology procedures  Code Status: full     Will be staffed in am  Enrique Burkett MD   Internal Medicine PGY2  [phone]    I have reviewed today's vital signs, notes, medications, labs and imaging.  I have also seen and examined the patient and agree with the findings and plan as outlined above.  60 yo WM with HM3 awaiting OHT ,  HTN, DM with hx of nephrolithiasis presented with hydronephrosis, renal calculi and ANDREW.  Plan is to provide supportive care, monitor renal function and consult urology regarding L stone.  Plan discussed with pt and team.     Hugo Sánchez MD, PhD  Professor, Heart Failure and Cardiac Transplantation  [facility]

## 2020-05-13 NOTE — PROGRESS NOTES
"SPIRITUAL HEALTH SERVICES  SPIRITUAL ASSESSMENT Progress Note  [facility] 6C   ON-CALL VISIT    REFERRAL SOURCE: Hospital  request for emotional support.    DATA: I spoke with Red via his room phone.  He welcomed the checkin, but did not have any concerns and just wanted \"a blessing\" for his health.     PLAN:I offered a blessing and encouraged him to call [facility] if he had any needs.    Carlos Eduardo Coello  Chaplain Resident  Pager [phone]    "

## 2020-05-13 NOTE — CONSULTS
Urology Consult History and Physical    Name: Mariusz Sharp    MRN: [id number]   YOB: 1960       We were asked to see Mariusz Sharp at the request of Dr. Burkett for evaluation and treatment of the following chief complaint:          Chief Complaint:   - Andrew in the setting of known left obstructing ureteral stones    History is obtained from patient and review of records          History of Present Illness:   Mariusz Sharp is a 59 year old male with PMH significant for CAD (s/p STEMI with ALYSSA), ICM s/p LVAD 12/2018 on chronic warfarin and undergoing eval for possible transplant, DM II, CKD III and previous R-sided nephrolithiases who was seen by Dr. Ibarra ([facility] Staff) on 3/27/19 for gross hematuria and was found to have a 1cm left lower pole nonobstructing calculus.  His cystoscopy and CTU (5/3/19) were otherwise unremarkable at that time.      More recently he was seen in [facility] ED on 5/3/20 with left flank pain 2/2 obstructing ureteral stone and was discharged with pain control and urologic followup with Dr. Ibarra on 5/8/20.  For the large nonobstructing lower pole stone (2.2 x 1.6 x 3.0cm), future PCNL would be attempted, and for the smaller (4mm and 6mm) obstructing distal ureteral stones, MET was recommended.  In the several days following this visit, Mr. Sharp did pass a couple small pieces of gravel, which he collected but did not bring with him to the hospital. His flank pain subsequently completely resolved.     Yesterday Mr. Sharp was directed-admitted after undergoing routine labs that showed ANDREW with Cr of 2.7 up from baseline of 1.7mg/dL.  Urology has been consulted to readdress role of stones.  Of note, the patient did undergo a CT AP without contrast yesterday that showed a persistent 2.2x1.6x3.0cm left pelvic calyceal stone without hydronephrosis although mild pelviectasis was present, and the ureteral stones were no longer present.  Over the past 24 hours, creatinine is  slowly improving (2.7mg/dL at admission 5/11 then --> 2.16mg/dL at 10pm 5/12 then --> 2.04mg/dL at 5am on 5/13.  He has no flank pain, abdominal pain, hematuria, dysuria, fevers, chills. UA from 5/12 had 3 WBCs and 75 RBCs, neg nitrites.     Of note, he is NPO today.  His last warfarin tab was yesterday evening. His INR is 2.9 today.  He is hoping his INR can drift while bridging with heparin gtt, so that he can have his PCNL with this hospitalization.           Past Medical History:     Past Medical History:   Diagnosis Date     Acute kidney injury (H)      CKD (chronic kidney disease)      Coronary artery disease      Diabetes mellitus (H)      HTN (hypertension)      Hyperlipidemia      Ischemic cardiomyopathy      LV (left ventricular) mural thrombus      Paroxysmal atrial flutter (H)      RVF (right ventricular failure) (H)      Systolic heart failure (H)      Ventricular tachycardia (H)             Past Surgical History:     Past Surgical History:   Procedure Laterality Date     COLONOSCOPY N/A 12/7/2018    Procedure: COLONOSCOPY;  Surgeon: Krish Mercado MD;  [facility]     CV RIGHT HEART CATH N/A 2/19/2019    Procedure: RHC;  Surgeon: Brian Long MD;  Location:  HEART CARDIAC CATH LAB     CV RIGHT HEART CATH N/A 7/5/2019    Procedure: CV RIGHT HEART CATH;  Surgeon: Khris Mcclelland MD;  Location:  HEART CARDIAC CATH LAB     HC PRQ TRLUML CORONARY ANGIOPLASTY ADDL BRANCH      PCI and ALYSSA to ostial LAD on 6/27/18     IMPLANT AUTOMATIC IMPLANTABLE CARDIOVERTER DEFIBRILLATOR       INSERT VENTRICULAR ASSIST DEVICE LEFT (HEARTMATE II) N/A 12/31/2018    Procedure: Median Sternotomy, On Cardiopulmonary Bypass Pump, Insertion of Left Ventricular Assist Device (Heartmate III);  Surgeon: Kamaljit Baker MD;  Location:  OR            Social History:     Social History     Tobacco Use     Smoking status: Never Smoker     Smokeless tobacco: Never Used   Substance Use Topics     Alcohol use: No      Frequency: Never   Retired/disabled  Previously worked as a school janitor         Family History:   No family history on file.  Mom- history of nephrolithiases         Allergies:   No Known Allergies         Medications:     Current Facility-Administered Medications   Medication     aspirin (ASA) chewable tablet 81 mg     carvedilol (COREG) tablet 25 mg     glucose gel 15-30 g    Or     dextrose 50 % injection 25-50 mL    Or     glucagon injection 1 mg     glucose gel 15-30 g    Or     dextrose 50 % injection 25-50 mL    Or     glucagon injection 1 mg     [START ON 5/14/2020] digoxin (LANOXIN) tablet 125 mcg     famotidine (PEPCID) tablet 20 mg     hydrALAZINE (APRESOLINE) tablet 50 mg     insulin aspart (NovoLOG) injection (RAPID ACTING)     insulin aspart (NovoLOG) injection (RAPID ACTING)     insulin glargine (LANTUS PEN) injection 24 Units     lidocaine (LMX4) cream     lidocaine 1 % 0.1-1 mL     melatonin tablet 1 mg     naloxone (NARCAN) injection 0.1-0.4 mg     Patient is already receiving mechanical prophylaxis     polyethylene glycol (MIRALAX) Packet 17 g     rosuvastatin (CRESTOR) tablet 20 mg     senna-docusate (SENOKOT-S/PERICOLACE) 8.6-50 MG per tablet 1 tablet    Or     senna-docusate (SENOKOT-S/PERICOLACE) 8.6-50 MG per tablet 2 tablet     sodium chloride (PF) 0.9% PF flush 3 mL     sodium chloride (PF) 0.9% PF flush 3 mL     tamsulosin (FLOMAX) capsule 0.4 mg     warfarin-No DOSE today             Review of Systems:    ROS: See HPI for pertinent details.  Remainder of 10-point ROS negative.         Physical Exam:   VS:  T: 98.2    HR: Data Unavailable    BP: 111/94    RR: 16   GEN:  AOx3.  NAD.  Pleasant.  HEENT:  Sclerae anicteric.  Conjunctivae pink.  Moist mucous membranes  NECK:  Supple.  No lymphadenopathy.  LUNGS: Non-labored breathing.  BACK:  No costoverterbral tenderness on either side  ABD:  Soft.  NT.  ND.  No rebound or guarding.  + right sided LVAD tubing - site is clean, dressed,  without erythema  TYRONE: Deferred  EXT:  Warm, well perfused.  No lower extremity edema bilaterally  SKIN:  Warm.  Dry.  No rashes.  NEURO:  CN grossly intact.           Data:   All laboratory data reviewed:    Lab Results   Component Value Date    PSA 3.83 03/27/2019    PSA 4.48 08/06/2018     Recent Labs   Lab 05/13/20 0523 05/12/20 2215 05/11/20   WBC 6.4 6.4 6.4   HGB 11.0* 11.1* 11.2*    289 293     Recent Labs   Lab 05/13/20 0523 05/12/20 2215 05/11/20    138 137   POTASSIUM 4.4 4.6 5.1   CHLORIDE 110* 107 104   CO2 23 24 23   BUN 42* 46* 61   CR 2.04* 2.16* 2.7   * 174* 172*   ARLENE 8.9 8.6 8.7     Recent Labs   Lab 05/12/20  2250   COLOR Yellow   APPEARANCE Clear   URINEGLC 150*   URINEBILI Negative   URINEKETONE Negative   SG 1.016   URINEPH 5.0   PROTEIN 10*   NITRITE Negative   LEUKEST Negative   RBCU 75*   WBCU 3     Results for orders placed or performed in visit on 03/27/19   Urine Culture Aerobic Bacterial    Specimen: Unspecified Urine   Result Value Ref Range    Specimen Description Unspecified Urine     Special Requests Specimen received in preservative     Culture Micro       <10,000 colonies/mL  urogenital simona  Susceptibility testing not routinely done     Results for orders placed or performed in visit on 02/11/19   Urine Culture Aerobic Bacterial    Specimen: Midstream Urine   Result Value Ref Range    Specimen Description Midstream Urine     Special Requests Specimen received in preservative     Culture Micro No growth        All pertinent imaging reviewed:  EXAMINATION: CT of the abdomen and pelvis on 5/13/2020.     INDICATION: Flank pain. Recurrent stone disease suspected.      COMPARISON: CT dated 5/3/2019      TECHNIQUE: Axial images of the chest, abdomen and pelvis were obtained  without contrast. Coronal reconstructions were provided. Images were  reviewed in bone, lung, and soft tissue windows.     Dose: 230 mGy*cm     FINDINGS:     Lines and tubes: LVAD.     Lower  thorax:  The lung bases are clear. No pericardial effusion.     Abdomen and Pelvis: Limited evaluation due to noncontrast study.  No focal hepatic masses and fine on today's noncontrast exam.  Cholelithiasis. No gallbladder wall thickening or cholecystic fluid.  The spleen, pancreas, adrenal glands and right kidney are  unremarkable. Increased size of the inferior left pelvic calyceal  stone measuring 22 x 16 x 30 mm, previously 15 x 10 x 24 mm. Mild  right pelviectasis. No hydronephrosis or hydroureter.     Small hiatal hernia. No bowel dilation or wall thickening. The  appendix is normal. No intra-abdominal free air or free fluid.     Vessels and lymph nodes:  The aorta is normal in caliber. Atherosclerotic calcifications of the  aorta and branching vessels. Pelvic phleboliths. No intra-abdominal or  pelvic lymphadenopathy.     Bones and soft tissues: Degenerative changes of the spine. No  suspicious osseous lesions. Fat-containing umbilical hernia.                                                                      IMPRESSION:   1. Increased size of the inferior left pelvocalyceal stone measuring  22 x 16 x 30 mm associated with mild pelviectasis. No overt  hydronephrosis.  2. Cholelithiasis without evidence of cholecystitis.         Impression and Plan:   Impression / Plan:   Mariusz Sharp is a 59 year old male with ICM s/p LVAD on chronic anticoagulation who was admitted with ANDREW on CKD.  On imaging from yesterday he has a large left sided non-obstructing stone.  He recently passed two small distal left ureteral stones and currently has no hydronephrosis on either side.        RECS:  - Although it may have been the ureteral stones that contributed to the kidney insult, Mr. Sharp currently has no ureteral stones and the kidneys appear to be draining well.  Agree with medical optimization and monitoring of Cr.  Currently it appears creatinine is currently trending in the right direction.   - Regarding the  enlarging left renal calcification ... this will need to be treated with either a percutaneous procedure which would require that anticoagulation be held VERSUS staged ureteroscopies, which could be performed on anticoagulation but would require multiple OR procedures.  At this time, would recommend neither of these given the current COVID climate and lack of surgical urgency.  - Mr. Shaw currently has outpatient followup scheduled with Dr. Ibarra on 6/5/20 to review options again and plan intervention.      Urology will sign off, but please call with any questions, particularly if Creatinine fails to correct and there are new concerns for renal obstruction.     Discussed with Dr. Ibarra.     Thank you for the opportunity to participate in the care of Mariusz Sharp.     Karolina Nowak PA-C  Urology Physician Assistant  Personal Pager: [phone]    Please call Job Code:   [phone] to reach the Urology resident or PA on call - Weekdays  [phone] to reach the Urology resident or PA on call - Weeknights and weekends

## 2020-05-13 NOTE — PHARMACY-ANTICOAGULATION SERVICE
Clinical Pharmacy - Warfarin Dosing Consult     Pharmacy has been consulted to manage this patient s warfarin therapy.  Indication: LVAD/RVAD  Therapy Goal: INR 2-3  Warfarin Prior to Admission: Yes  Warfarin PTA Regimen: 5mg daily  Significant drug interactions: aspirin  Recent documented change in oral intake/nutrition: Unknown    INR   Date Value Ref Range Status   05/12/2020 2.47 (H) 0.86 - 1.14 Final   05/11/2020 2.5 (A) 0.90 - 1.10 Final     Chromogenic Factor 10   Date Value Ref Range Status   01/11/2019 72 70 - 130 % Final     Comment:     Therapeutic Range:  A Chromogenic Factor 10 level of approximately 20-40%   inversely correlates with an INR of 2-3 for patients receiving Warfarin.   Chromogenic Factor 10 levels below 20% indicate an INR greater than 3 and   levels above 40% indicate an INR less than 2.         Patient took warfarin dose prior to admission on 5/12/20. No further dose necessary tonight.  Pharmacy will monitor Mariusz Sharp daily and order warfarin doses to achieve specified goal.      Please contact pharmacy as soon as possible if the warfarin needs to be held for a procedure or if the warfarin goals change.      Neil Tang, PharmD, BCPS

## 2020-05-13 NOTE — PROGRESS NOTES
Post LVAD Patient Social Work Assessment     Patient Name: Mariusz Sharp  : 1960  Age: 59 year old  MRN: [id number]  Date of LVAD: 2018    Patient known to me from follow up in the LVAD/transplant program.  Admitted on 20 for Kidney stones.  Seen today to update assessment.  Currently on the heart transplant list    Presenting Information   Living Situation: Lives with 83 year-old Father in Father's home in [place].  Functional Status: Independent with ADLs and helps to care for Father. He is able to drive and manage household chores.  Cultural/Language/Spiritual Considerations: None    Support System  Primary Support Person Brother-Romeo, Son-Stepan, and friend  Other support:  N/A  Plan for support in immediate post-hospital period: Will return home after kidney stone issues are resolved    Health Care Directive  Decision Maker: Patient  Alternate Decision Maker: Has adult children  Health Care Directive: Does not have on file. Did not discuss with him today. In the past, has stated he would want Brother and SonStepan as his decision-makers    Mental Health/Coping:   History of Mental Health: No history of MH issues  History of Chemical Health: No history of Chem Dep  Current status: N/A  Coping: Good coping skills  Services Needed/Recommended: None identified    Financial   Income: Social Security  Impact of LVAD on income: No significant impact  Insurance and medication coverage: [employer]  Financial concerns: No concerns voiced at this time  Resources needed: None identified    Discharge Plan   Patient and family discharge goal: Discharge home when medically stable  Barriers to discharge: Medical condition-Needs IV heparin to change INR to prepare for kidney stone removal    Education provided by SW: Social Work role inpatient setting    Assessment and recommendations and plan:  Writer familiar with Red from LVAD and transplant assessments. No current psychosocial needs  identified. Patient anticipates having surgery to remove the big stone left in his kidney and anticipates discharge home when recovered. Will continue to follow and remain available, but no SW discharge planning needs identified currently.

## 2020-05-13 NOTE — PLAN OF CARE
History of CAD (s/p STEMI with ALYSSA to LAD 6/27/18), LV thrombus, CKD Stage III, PAF, DM Type II, and ICM with EF 20-25% s/p HeartMate 3 LVAD implant 12/31/18   D: presented as direct admit for nephrolithiasis c/b hydronephrosis and ANDREW  ?  I: Monitored vitals and assessed pt status.   Changed: carb coverage insulin added.     ?  A: A0x4. VSS. Denies pain. HeartMate 3 LVAD and all components at bedside and in place.   ?  P: Continue to monitor Pt status and report changes to treatment team.

## 2020-05-13 NOTE — PLAN OF CARE
D: Admitted 5/12 for nephrolithiasis c/b hydronephrosis and ANDREW     Hx of CAD (s/p STEMI with ALYSSA to LAD 6/27/18), LV thrombus, CKD Stage III, PAF, DM Type II, and ICM with EF 20-25% s/p HeartMate 3 LVAD implant 12/31/18     I/A: A/Ox4. VSS q8hr on RA. LVAD #s WNL except for higher, fluctuating PI's (6-8). Hematocrit set @20, need a pt care order for this. Denies pain. Dressing CDI, pt brought kits from home and would like to do weekly dressing change himself. Voiding adequately. Up ad domingo.    P: NPO since MN. Urology consult. Continue to monitor and notify Cards 2 with changes/concerns.

## 2020-05-14 LAB
ANION GAP SERPL CALCULATED.3IONS-SCNC: 8 MMOL/L (ref 3–14)
BUN SERPL-MCNC: 36 MG/DL (ref 7–30)
CALCIUM SERPL-MCNC: 8.9 MG/DL (ref 8.5–10.1)
CHLORIDE SERPL-SCNC: 108 MMOL/L (ref 94–109)
CO2 SERPL-SCNC: 24 MMOL/L (ref 20–32)
CREAT SERPL-MCNC: 1.93 MG/DL (ref 0.66–1.25)
DIGOXIN SERPL-MCNC: 0.7 UG/L (ref 0.5–2)
ERYTHROCYTE [DISTWIDTH] IN BLOOD BY AUTOMATED COUNT: 15.5 % (ref 10–15)
GFR SERPL CREATININE-BSD FRML MDRD: 37 ML/MIN/{1.73_M2}
GLUCOSE BLDC GLUCOMTR-MCNC: 111 MG/DL (ref 70–99)
GLUCOSE BLDC GLUCOMTR-MCNC: 119 MG/DL (ref 70–99)
GLUCOSE BLDC GLUCOMTR-MCNC: 129 MG/DL (ref 70–99)
GLUCOSE BLDC GLUCOMTR-MCNC: 141 MG/DL (ref 70–99)
GLUCOSE SERPL-MCNC: 110 MG/DL (ref 70–99)
HCT VFR BLD AUTO: 38.5 % (ref 40–53)
HGB BLD-MCNC: 11.7 G/DL (ref 13.3–17.7)
INR PPP: 2.76 (ref 0.86–1.14)
MCH RBC QN AUTO: 25.4 PG (ref 26.5–33)
MCHC RBC AUTO-ENTMCNC: 30.4 G/DL (ref 31.5–36.5)
MCV RBC AUTO: 84 FL (ref 78–100)
PLATELET # BLD AUTO: 281 10E9/L (ref 150–450)
POTASSIUM SERPL-SCNC: 4.2 MMOL/L (ref 3.4–5.3)
RBC # BLD AUTO: 4.6 10E12/L (ref 4.4–5.9)
SODIUM SERPL-SCNC: 139 MMOL/L (ref 133–144)
WBC # BLD AUTO: 7.4 10E9/L (ref 4–11)

## 2020-05-14 PROCEDURE — 99233 SBSQ HOSP IP/OBS HIGH 50: CPT | Mod: GC | Performed by: INTERNAL MEDICINE

## 2020-05-14 PROCEDURE — 80162 ASSAY OF DIGOXIN TOTAL: CPT | Performed by: STUDENT IN AN ORGANIZED HEALTH CARE EDUCATION/TRAINING PROGRAM

## 2020-05-14 PROCEDURE — 85027 COMPLETE CBC AUTOMATED: CPT | Performed by: STUDENT IN AN ORGANIZED HEALTH CARE EDUCATION/TRAINING PROGRAM

## 2020-05-14 PROCEDURE — 36415 COLL VENOUS BLD VENIPUNCTURE: CPT | Performed by: STUDENT IN AN ORGANIZED HEALTH CARE EDUCATION/TRAINING PROGRAM

## 2020-05-14 PROCEDURE — 80048 BASIC METABOLIC PNL TOTAL CA: CPT | Performed by: STUDENT IN AN ORGANIZED HEALTH CARE EDUCATION/TRAINING PROGRAM

## 2020-05-14 PROCEDURE — 25000132 ZZH RX MED GY IP 250 OP 250 PS 637: Performed by: INTERNAL MEDICINE

## 2020-05-14 PROCEDURE — 25000132 ZZH RX MED GY IP 250 OP 250 PS 637: Performed by: STUDENT IN AN ORGANIZED HEALTH CARE EDUCATION/TRAINING PROGRAM

## 2020-05-14 PROCEDURE — 85610 PROTHROMBIN TIME: CPT | Performed by: STUDENT IN AN ORGANIZED HEALTH CARE EDUCATION/TRAINING PROGRAM

## 2020-05-14 PROCEDURE — 21400000 ZZH R&B CCU UMMC

## 2020-05-14 PROCEDURE — 00000146 ZZHCL STATISTIC GLUCOSE BY METER IP

## 2020-05-14 RX ORDER — WARFARIN SODIUM 4 MG/1
4 TABLET ORAL
Status: COMPLETED | OUTPATIENT
Start: 2020-05-14 | End: 2020-05-14

## 2020-05-14 RX ADMIN — FAMOTIDINE 20 MG: 20 TABLET ORAL at 19:48

## 2020-05-14 RX ADMIN — WARFARIN SODIUM 4 MG: 4 TABLET ORAL at 18:10

## 2020-05-14 RX ADMIN — HYDRALAZINE HYDROCHLORIDE 50 MG: 50 TABLET, FILM COATED ORAL at 07:50

## 2020-05-14 RX ADMIN — CARVEDILOL 25 MG: 25 TABLET, FILM COATED ORAL at 18:10

## 2020-05-14 RX ADMIN — TAMSULOSIN HYDROCHLORIDE 0.4 MG: 0.4 CAPSULE ORAL at 07:50

## 2020-05-14 RX ADMIN — ASPIRIN 81 MG CHEWABLE TABLET 81 MG: 81 TABLET CHEWABLE at 07:50

## 2020-05-14 RX ADMIN — FAMOTIDINE 20 MG: 20 TABLET ORAL at 07:50

## 2020-05-14 RX ADMIN — HYDRALAZINE HYDROCHLORIDE 50 MG: 50 TABLET, FILM COATED ORAL at 14:08

## 2020-05-14 RX ADMIN — CARVEDILOL 25 MG: 25 TABLET, FILM COATED ORAL at 07:50

## 2020-05-14 RX ADMIN — ROSUVASTATIN CALCIUM 20 MG: 20 TABLET, FILM COATED ORAL at 21:46

## 2020-05-14 ASSESSMENT — ACTIVITIES OF DAILY LIVING (ADL)
ADLS_ACUITY_SCORE: 11

## 2020-05-14 NOTE — PLAN OF CARE
D: Admitted 5/12 for nephrolithiasis c/b hydronephrosis and ANDREW     Hx of CAD (s/p STEMI with ALYSSA to LAD 6/27/18), LV thrombus, CKD Stage III, PAF, DM Type II, and ICM with EF 20-25% s/p HeartMate 3 LVAD implant 12/31/18     I/A: A/Ox4. VSS q8hr on RA. LVAD #s WNL except for some usual PI events. Hematocrit set @20 per care order. Denies pain. Dressing CDI. Voiding adequately. Up ad domingo.    P: Continue to monitor and notify Cards 2 with changes/concerns.

## 2020-05-14 NOTE — PHARMACY-ANTICOAGULATION SERVICE
Clinical Pharmacy - Warfarin Dosing Consult     Pharmacy has been consulted to manage this patient s warfarin therapy.  Indication: LVAD/RVAD  Therapy Goal: INR 2-3  Provider/Team: Lew [facility] Clinic: [facility]  Warfarin Prior to Admission: Yes  Warfarin PTA Regimen: 5 mg daily  Significant drug interactions: aspirin  Recent documented change in oral intake/nutrition: Unknown    INR   Date Value Ref Range Status   05/14/2020 2.76 (H) 0.86 - 1.14 Final   05/13/2020 2.88 (H) 0.86 - 1.14 Final     Chromogenic Factor 10   Date Value Ref Range Status   01/11/2019 72 70 - 130 % Final     Comment:     Therapeutic Range:  A Chromogenic Factor 10 level of approximately 20-40%   inversely correlates with an INR of 2-3 for patients receiving Warfarin.   Chromogenic Factor 10 levels below 20% indicate an INR greater than 3 and   levels above 40% indicate an INR less than 2.         Recommend warfarin 4 mg today.  Pharmacy will monitor Mariusz Sharp daily and order warfarin doses to achieve specified goal.      Please contact pharmacy as soon as possible if the warfarin needs to be held for a procedure or if the warfarin goals change.

## 2020-05-14 NOTE — PROGRESS NOTES
"Urology Brief Note:    Acute kidney injury seems to be slowly resolving.  Renal imaging shows no significant hydronephrosis to suggest renal obstruction, thus would not recommend a ureteral stent at this time.  Given the patient's pending candidacy for cardiac transplant, it was discussed whether a prophylactic left ureteral stent should be placed to protect the kidney \"in case\" Mr. Sharp develops a ureteral stone.  This scenario was discussed with staff, Dr. Ibarra, who felt that on the balance the risks of a prophylactic stent (including gross hematuria, a chronically \"dirty\" UA, flank pain, bladder spasms, irritative voiding symptoms such as incontinence) likely would outweigh potential benefits.      Instead, would recommend monitoring renal fxn as you will.  And can image kidneys at any point to rule out hydronephrosis if obstruction is suspected.  Ureteral stenting could be performed by urology as needed.     Regarding the stone, see the prior note from yesterday.  Treatment is complicated in this gentleman who is on obligate anticoagulation for his LVAD. Additionally, COVID climate dictates that elective surgeries be minimized.  Would not recommend stone clearance be attempted at this time but the patient does have followup in early June with Dr. Velez to further discuss.    Discussed with Dr. Velez and the cardiology service    - [facility]  [phone]  "

## 2020-05-14 NOTE — PLAN OF CARE
D-HM 3 LVAD pt admitted yesterday for nephrolithiasis and increasing creatinine. Creatine 2.04 this am. Denies pain. Urine is clear/yellow.INR 1.96.   I-Urology consulted.(See progress note) 3 mg of coumadin this evening per order.  P-Recheck creatinine in am. Anticipate discharge to home if creatinine continues to trend downward. Follow up with with urology as outpatient on 06/06/20.

## 2020-05-14 NOTE — PLAN OF CARE
D: Admitted s/p nephrolithiasis c/b hydronephrosis and ANDREW   PMH of CAD (s/p STEMI w/ALYSSA to LAD in 2018), LV thrombus, HFrEF 20-25% s/p LVAD, CKD Stage III, HTN, T2DM,      I: Monitored vitals and assessed pt status.      A: Pt A0x4. Afebrile. VSS. HRs 50s-60s. Sinus bradycardia/rhythm. Sats >92% on RA. HM3 LVAD. LVAD numbers WNL except some high PI's. Driveline site CDI, no drainage. Dressing not changed today per order, weekly dressing change. Hematocrit set @ 20 per care order. Pt on 2g Na+ diet. Up independently.      P: Monitor pt's creatinine level. Discharge when medically stable. Continue to monitor pt status and notify Cards 2 treatment team with any changes.

## 2020-05-15 VITALS
BODY MASS INDEX: 32.56 KG/M2 | SYSTOLIC BLOOD PRESSURE: 94 MMHG | TEMPERATURE: 98.4 F | RESPIRATION RATE: 16 BRPM | DIASTOLIC BLOOD PRESSURE: 73 MMHG | OXYGEN SATURATION: 98 % | WEIGHT: 190.7 LBS | HEART RATE: 63 BPM | HEIGHT: 64 IN

## 2020-05-15 LAB
ANION GAP SERPL CALCULATED.3IONS-SCNC: 10 MMOL/L (ref 3–14)
BUN SERPL-MCNC: 33 MG/DL (ref 7–30)
CALCIUM SERPL-MCNC: 9.2 MG/DL (ref 8.5–10.1)
CHLORIDE SERPL-SCNC: 108 MMOL/L (ref 94–109)
CO2 SERPL-SCNC: 21 MMOL/L (ref 20–32)
CREAT SERPL-MCNC: 1.84 MG/DL (ref 0.66–1.25)
ERYTHROCYTE [DISTWIDTH] IN BLOOD BY AUTOMATED COUNT: 15.5 % (ref 10–15)
GFR SERPL CREATININE-BSD FRML MDRD: 40 ML/MIN/{1.73_M2}
GLUCOSE BLDC GLUCOMTR-MCNC: 100 MG/DL (ref 70–99)
GLUCOSE BLDC GLUCOMTR-MCNC: 102 MG/DL (ref 70–99)
GLUCOSE BLDC GLUCOMTR-MCNC: 131 MG/DL (ref 70–99)
GLUCOSE SERPL-MCNC: 104 MG/DL (ref 70–99)
HAV IGG SER QL IA: REACTIVE
HBV CORE AB SERPL QL IA: NONREACTIVE
HBV SURFACE AB SERPL IA-ACNC: 419.9 M[IU]/ML
HBV SURFACE AG SERPL QL IA: NONREACTIVE
HCT VFR BLD AUTO: 41.1 % (ref 40–53)
HCV AB SERPL QL IA: NONREACTIVE
HGB BLD-MCNC: 12.4 G/DL (ref 13.3–17.7)
INR PPP: 2.21 (ref 0.86–1.14)
MCH RBC QN AUTO: 25.3 PG (ref 26.5–33)
MCHC RBC AUTO-ENTMCNC: 30.2 G/DL (ref 31.5–36.5)
MCV RBC AUTO: 84 FL (ref 78–100)
PLATELET # BLD AUTO: 283 10E9/L (ref 150–450)
POTASSIUM SERPL-SCNC: 4 MMOL/L (ref 3.4–5.3)
PSA SERPL-ACNC: 8.51 UG/L (ref 0–4)
RBC # BLD AUTO: 4.91 10E12/L (ref 4.4–5.9)
SODIUM SERPL-SCNC: 139 MMOL/L (ref 133–144)
T PALLIDUM AB SER QL: NONREACTIVE
WBC # BLD AUTO: 6.6 10E9/L (ref 4–11)

## 2020-05-15 PROCEDURE — 80048 BASIC METABOLIC PNL TOTAL CA: CPT | Performed by: STUDENT IN AN ORGANIZED HEALTH CARE EDUCATION/TRAINING PROGRAM

## 2020-05-15 PROCEDURE — 99239 HOSP IP/OBS DSCHRG MGMT >30: CPT | Mod: GC | Performed by: INTERNAL MEDICINE

## 2020-05-15 PROCEDURE — 36415 COLL VENOUS BLD VENIPUNCTURE: CPT | Performed by: STUDENT IN AN ORGANIZED HEALTH CARE EDUCATION/TRAINING PROGRAM

## 2020-05-15 PROCEDURE — 86704 HEP B CORE ANTIBODY TOTAL: CPT | Performed by: STUDENT IN AN ORGANIZED HEALTH CARE EDUCATION/TRAINING PROGRAM

## 2020-05-15 PROCEDURE — 85027 COMPLETE CBC AUTOMATED: CPT | Performed by: STUDENT IN AN ORGANIZED HEALTH CARE EDUCATION/TRAINING PROGRAM

## 2020-05-15 PROCEDURE — 86706 HEP B SURFACE ANTIBODY: CPT | Performed by: STUDENT IN AN ORGANIZED HEALTH CARE EDUCATION/TRAINING PROGRAM

## 2020-05-15 PROCEDURE — 00000146 ZZHCL STATISTIC GLUCOSE BY METER IP

## 2020-05-15 PROCEDURE — 86704 HEP B CORE ANTIBODY TOTAL: CPT | Performed by: INTERNAL MEDICINE

## 2020-05-15 PROCEDURE — 87340 HEPATITIS B SURFACE AG IA: CPT | Performed by: STUDENT IN AN ORGANIZED HEALTH CARE EDUCATION/TRAINING PROGRAM

## 2020-05-15 PROCEDURE — 86803 HEPATITIS C AB TEST: CPT | Performed by: STUDENT IN AN ORGANIZED HEALTH CARE EDUCATION/TRAINING PROGRAM

## 2020-05-15 PROCEDURE — G0103 PSA SCREENING: HCPCS | Performed by: STUDENT IN AN ORGANIZED HEALTH CARE EDUCATION/TRAINING PROGRAM

## 2020-05-15 PROCEDURE — 86708 HEPATITIS A ANTIBODY: CPT | Performed by: STUDENT IN AN ORGANIZED HEALTH CARE EDUCATION/TRAINING PROGRAM

## 2020-05-15 PROCEDURE — 86780 TREPONEMA PALLIDUM: CPT | Performed by: STUDENT IN AN ORGANIZED HEALTH CARE EDUCATION/TRAINING PROGRAM

## 2020-05-15 PROCEDURE — 25000132 ZZH RX MED GY IP 250 OP 250 PS 637: Performed by: STUDENT IN AN ORGANIZED HEALTH CARE EDUCATION/TRAINING PROGRAM

## 2020-05-15 PROCEDURE — 85610 PROTHROMBIN TIME: CPT | Performed by: STUDENT IN AN ORGANIZED HEALTH CARE EDUCATION/TRAINING PROGRAM

## 2020-05-15 RX ORDER — WARFARIN SODIUM 5 MG/1
5 TABLET ORAL
Status: DISCONTINUED | OUTPATIENT
Start: 2020-05-15 | End: 2020-05-15 | Stop reason: HOSPADM

## 2020-05-15 RX ORDER — DIGOXIN 125 MCG
125 TABLET ORAL
Status: DISCONTINUED | OUTPATIENT
Start: 2020-05-16 | End: 2020-05-15 | Stop reason: HOSPADM

## 2020-05-15 RX ADMIN — ASPIRIN 81 MG CHEWABLE TABLET 81 MG: 81 TABLET CHEWABLE at 07:53

## 2020-05-15 RX ADMIN — FAMOTIDINE 20 MG: 20 TABLET ORAL at 07:53

## 2020-05-15 RX ADMIN — HYDRALAZINE HYDROCHLORIDE 50 MG: 50 TABLET, FILM COATED ORAL at 07:53

## 2020-05-15 RX ADMIN — HYDRALAZINE HYDROCHLORIDE 50 MG: 50 TABLET, FILM COATED ORAL at 13:49

## 2020-05-15 RX ADMIN — SENNOSIDES AND DOCUSATE SODIUM 2 TABLET: 8.6; 5 TABLET ORAL at 07:53

## 2020-05-15 RX ADMIN — TAMSULOSIN HYDROCHLORIDE 0.4 MG: 0.4 CAPSULE ORAL at 07:53

## 2020-05-15 RX ADMIN — CARVEDILOL 25 MG: 25 TABLET, FILM COATED ORAL at 07:53

## 2020-05-15 ASSESSMENT — MIFFLIN-ST. JEOR: SCORE: 1601.01

## 2020-05-15 ASSESSMENT — ACTIVITIES OF DAILY LIVING (ADL)
ADLS_ACUITY_SCORE: 11

## 2020-05-15 NOTE — PROGRESS NOTES
Cardiology Progress Note  5/13/2020      ASSESSMENT/PLAN:  59yoM w/ hx of CAD c/b ICMP, HFrEF s/p LVAD, CKDIII, admitted for nephrolithiasis c/b hydronephrosis and ANDREW     # ANDREW on CKD III  # Nephrolithiasis c/b hydroureter and hydronephrosis, improving  Passed two small stones seen on CT on 5/3. Discussed with urology regarding PCNL vs ureterscopy. Pt currently has no pain, ANDREW improving since 5/11 and CT showed interval improvement of hydronephrosis and resolution of hydroureter.  Urology does not recommended procedure during this hospitalization.  We will discussed with them tomorrow about proper timing for this.  If patient's quality of life, comorbidity or mortality will be affected in a short-term period of time (6-12 months) by this urolithiasis, we will prefer to have these done before heart transplant, considering that patients with heart mate 3 LVAD can be off anticoagulation up to 1 month. On the contrary, we can defer this procedure for after heart transplant .    Creatinine is trending down, no signs of obstruction.     - urology consult, appreciate the assistance  - CT abd/pelv done   - UA/UC  - Warfarin was resumed as not intervention has been planned during this hospitalization.   - trend BMP     # CAD s/p ALYSSA  # ICMP  # HFrEF 20-25% s/p LVAD   LVAD since illness: 2018, has been on warfarin. INR 2.47 on admission. No VAD alarms outside of PI events which are typical for him.  ACEi/ARB - Not on PTA  Beta blocker - Continue PTA Coreg  Aldosterone antagonist - Not on PTA  SCD prophylaxis - ICD  Fluid status - Euvolemic  Afterload - Continue pta hydral  Hold digoxin given renal function until digoxin level returns.     # HTN   Normotensive since presentation   - pta hydralazine      # T2DM  Insulin dependent, takes 35U lantus at night w/ carb adjustment (1U for 7g CHO)  - lantus decrease to 24U  - medium sliding scale insulin  - Meal coverage insuline  - hypoglycemic protocol      FEN: none  DVT  "Prophylaxis:  Warfarin  GI Prophylaxis: pta famotidine  Disposition: cards 2, dispo pending resolution of ANDREW and plan for urologic procedures  Code Status: full     Plan d/w Dr. Gonzalo M.D., who is in agreement. Please, see staff addendum for changes/additions to the plan.    Prabha Polk MD  Internal Medicine PGY1  [phone]      ===================================================================    SUBJECTIVE: GIL o/n. Feels well. No fevers, chills. No flank pain. Denies any further hematuria. No other complaints this morning.    Nursing notes reviewed.    OBJECTIVE:  BP 95/78   Pulse 63   Temp 98.2  F (36.8  C) (Oral)   Resp 20   Ht 1.626 m (5' 4\")   Wt 88.8 kg (195 lb 12.8 oz)   SpO2 97%   BMI 33.61 kg/m    Temp (24hrs), Av.4  F (36.9  C), Min:97.9  F (36.6  C), Max:98.7  F (37.1  C)      I/O:      Intake/Output Summary (Last 24 hours) at 2020  Last data filed at 2020 1800  Gross per 24 hour   Intake 560 ml   Output 1325 ml   Net -765 ml       Wt:   Wt Readings from Last 5 Encounters:   20 88.8 kg (195 lb 12.8 oz)   20 88.9 kg (196 lb)   19 91.3 kg (201 lb 4.8 oz)   19 94.2 kg (207 lb 11.2 oz)   19 94.8 kg (209 lb)     GEN: pleasant, no acute distress  HEENT: no icterus  CV: LVAD hum. JVP not elevated  CHEST: clear to ausculation bilaterally, no rales or wheezing  ABD: soft, non-tender, normal active bowel sounds  EXTR: WWP. No clubbing, cyanosis or edema.   NEURO: alert oriented, speech fluent/appropriate, motor grossly nonfocal    Labs: reviewed in EMR  CBC  Recent Labs   Lab 20   WBC 7.4 6.4 6.4 6.4   RBC 4.60 4.36* 4.33* 4.24   HGB 11.7* 11.0* 11.1* 11.2*   HCT 38.5* 36.7* 36.4* 34   MCV 84 84 84 80.2   MCH 25.4* 25.2* 25.6* 26.4   MCHC 30.4* 30.0* 30.5* 32.9   RDW 15.5* 15.7* 15.8* 15.3    281 289 293     BMP  Recent Labs   Lab 20 "    139 138 137   POTASSIUM 4.2 4.4 4.6 5.1   CHLORIDE 108 110* 107 104   CO2 24 23 24 23   ANIONGAP 8 6 7 10   * 128* 174* 172*   BUN 36* 42* 46* 61   CR 1.93* 2.04* 2.16* 2.7   GFRESTIMATED 37* 35* 33* 24   GFRESTBLACK 43* 41* 38*  --    ARLENE 8.9 8.9 8.6 8.7     Troponins:     Lab Results   Component Value Date    TROPI 0.050 (H) 12/29/2018    TROPI 1.460 (HH) 07/16/2018     INR  Recent Labs   Lab 05/14/20  0622 05/13/20  0523 05/12/20  2215 05/11/20   INR 2.76* 2.88* 2.47* 2.5*       Imaging: reviewed in EMR.      I have reviewed today's vital signs, notes, medications, labs and imaging.  I have also seen and examined the patient and agree with the findings and plan as outlined above.  Pt without complaints.  No fever with VSS. Lungs clear and mechanical LVAD hum.  Labs with improving Cr 1.9.  Assessment: Pt with endstage heart failure supported with HM3 with nephrolithiasis.  Pt can tolerate being off of coumadin for surgical procedure to remove stone if necessary.  Will contact Urology to discuss and determine whether to delay surgery until after OHT if they anticipate no increased risk for infection or obstruction.     Hugo Sánchez MD, PhD  Professor, Heart Failure and Cardiac Transplantation  [facility]

## 2020-05-15 NOTE — PLAN OF CARE
Discharge  D:Pt Admitted s/p nephrolithiasis c/b hydronephrosis and ANDREW   PMH of CAD (s/p STEMI w/ALYSSA to LAD in 2018), LV thrombus, HFrEF 20-25% s/p LVAD, CKD Stage III, HTN, T2DM  A/I: Pt doing better. Pt up independently. Pt eating,drinking and voiding. Pt denies any c/o pain. Pt's medications adjusted for elevated CR. Pt instructed on all discharge medications, diet,activity and follow up appointments and tests. Pt denied any questions or complaints. Pt safely discharged with all belongings safely. No prescriptions to be filled.   P: Pt to follow up with [facility] and with Urology out patient.

## 2020-05-15 NOTE — PLAN OF CARE
D: admitted 5/12 with nephrolithiasis c/b hydronephrosis and ANDREW  Hx: CAD (s/p STEMI with ALYSSA to LAD 6/27/18), LV thrombus, CKD Stage III, PAF, DM Type II, and ICM with EF 20-25% s/p HeartMate 3 LVAD implant 12/31/18      I/A: A0x4. VSS on RA. Tele shows SB/SR rate 50-60s. Afebrile. Urinating adequately. Urine is yellow, pt denies dysuria. LVAD numbers WNL; no alarms overnight. Weekly dressing changes. Pt denied any pain/lightheadedness/nausea. Up independently. BG stable overnight.       P: Continue to monitor Pt status and report changes to Cards 2. [id number]  Marcy López RN on 5/15/2020 at 6:19 AM

## 2020-05-15 NOTE — PLAN OF CARE
D: Admitted s/p nephrolithiasis c/b hydronephrosis and ANDREW   PMH of CAD (s/p STEMI w/ALYSSA to LAD in 2018), LV thrombus, HFrEF 20-25% s/p LVAD, CKD Stage III, HTN, T2DM     I: Monitored vitals and assessed pt status.     A: Pt A0x4. Afebrile. VSS. HRs 50s. Sinus bradycardia. VS q8hrs. Sats >92% on RA. Pt denies any shortness of breath, pain, nausea, dizziness, or chills. HM3 LVAD. LVAD numbers WNL except some high PI's. Driveline site CDI, no drainage. Dressing not changed today per order, weekly dressing on Mondays. Hematocrit set @ 20 per care order. Urinating adequately. Pt on 2g Na+ diet. Up independently.       P: Discharge this evening. Continue to monitor pt status and notify Cards 2 treatment team with any changes.

## 2020-05-15 NOTE — PLAN OF CARE
D-Heart mate 3 LVAD pt, admitted on 05/12/20 with nephrolithiasis and increasing creatinine. Creatinine 1.93 today. No pain. Urine is clear yellow. Denies dysuria. INR 2.76. BP at 1940 89/51, MAP 62.  I-Urology consulted. 4 mg of coumadin this evening. Cards 2 cross cover MD notified of pt's blood pressure. T.O. to hold 2000 dose of hydralazine.  A-Repeat /84 MAP 92 at 2140.  P-Continue to monitor creatinine.

## 2020-05-15 NOTE — DISCHARGE SUMMARY
[address] [address]  p: [phone]    Discharge Summary: Cardiology Service    Mariusz Sharp MRN# [id number]   YOB: 1960 Age: 59 year old       Admission Date: 5/12/2020  Discharge Date: 05/15/20      Discharge Diagnoses:  1. Acute Kidney Injury on Chronic Kidney Disease   2. Nephrolithiasis  3. Ischemic Cardiomyopathy   4. Systolic Heart Failure status post HeartMate III    Pertinent Procedures:  N/A    Consults:  Urology    Imaging with results:  CT Abdomen Pelvis  IMPRESSION:   1. Increased size of the inferior left pelvocalyceal stone measuring  22 x 16 x 30 mm associated with mild pelviectasis. No overt  hydronephrosis.  2. Cholelithiasis without evidence of cholecystitis.    Brief HPI:  Mariusz Sharp is a 59 year old male with history of CAD (s/p STEMI with ALYSSA to LAD 6/27/18), LV thrombus, CKD Stage III, PAF, DM Type II, and ICM with EF 20-25% s/p HeartMate 3 LVAD implant 12/31/18 presented as direct admit for nephrolithiasis c/b hydronephrosis and ANDREW  Pt had acute L flank pain and presented to ED on 5/3 and was found to have 2 stones in the L ureter and modoerate L hydronephrosis and hydroureter. Pt also had a very large stone in the L collecting system that measured 26mm (doubled since 2017). He was prescribed flomax to pass the small stones and followed up with urology via virtual visit on 5/8, during which urology discussed with him regarding MET for the two smaller stones and possible PCNL vs ureteroscopy for the large stone. On 5/11, as part of txp workup, he was found to have ANDREW w/ cr of 2.7 from baseline of 1.7 and was directed admitted to the hospital for further workup    Hospital Course by Diagnosis:  # Acute Kidney Injury on Chronic Kidney Disease  # Nephrolithiasis  Presented to the ED on 5/3 with obstructive hydronephrosis. Passed two small stones seen on CT at that time with improving Cr. Admitted for  monitoring and urology consult. Urology does not recommended procedure during this hospitalization.  Likely will need percutaneous removal of the stone, which will be risky in the context of his LVAD and anti-coagulation. Would prefer to wait until after transplantation prior to stone extraction. If the stone is causing problems prior to transplant, could consider ureteral stent.  - Follow up with urology as scheduled  - Repeat BMP in 1-2 weeks  - Monitor for symptoms of nephrolithiasis    # Ischemic Cardiomyopathy  # Systolic Heart Failure s/p HMIII  ACEi/ARB - Hold losartan until Cr back to baseline  Beta blocker - Coreg 25mg BID  Aldosterone antagonist - Not on PTA  SCD prophylaxis - ICD  Fluid status - Euvolemic  Afterload - Hydralazine 50 TID   Continue PTA Digoxin  Anticoagulation - Continue warfarin    Condition on discharge  Temp:  [98.1  F (36.7  C)-98.9  F (37.2  C)] 98.4  F (36.9  C)  Pulse:  [63] 63  Heart Rate:  [55-63] 63  Resp:  [16-20] 16  BP: ()/(51-89) 96/81  SpO2:  [95 %-98 %] 98 %  General: Alert, interactive, NAD  Eyes: sclera anicteric, EOMI  Neck: JVP not elevated  Cardiovascular: LVAD Hum  Resp: clear to auscultation bilaterally, no rales, wheezes, or rhonchi  GI: Soft, nontender, nondistended. Driveline site c/d/i  Extremities: WWP. No edema  Skin: Warm and dry, no jaundice or rash  Neuro: CN 2-12 intact, moves all extremities equally  Psych: Alert & oriented x 3      Medication Changes:  Losartan - Hold until Creatinine back at baseline    Discharge medications:   Current Discharge Medication List      CONTINUE these medications which have NOT CHANGED    Details   ACCU-CHEK [place] PLUS test strip       aspirin (ASA) 81 MG chewable tablet Take 1 tablet (81 mg) by mouth daily  Qty: 90 tablet, Refills: 3    Associated Diagnoses: LVAD (left ventricular assist device) present (H)      BD SILVANA U/F 32G X 4 MM insulin pen needle       carvedilol (COREG) 12.5 MG tablet Take 2 tablets (25 mg) by  mouth 2 times daily (with meals) TAKE 2 TABLETS (25mg) BY MOUTH EVERY MORNING AND 2 TABLETS (25mg) EVERY AFTERNOON  Qty: 180 tablet, Refills: 3    Associated Diagnoses: Essential hypertension      digoxin (LANOXIN) 125 MCG tablet Take 1 tablet (125 mcg) by mouth four times a week  Qty: 90 tablet, Refills: 3    Associated Diagnoses: Acute systolic heart failure (H)      famotidine (PEPCID) 20 MG tablet Take 1 tablet (20 mg) by mouth 2 times daily  Qty: 180 tablet, Refills: 3    Associated Diagnoses: Acute on chronic systolic heart failure (H)      hydrALAZINE (APRESOLINE) 50 MG tablet Take 1 tablet (50 mg) by mouth 3 times daily  Qty: 180 tablet, Refills: 5    Associated Diagnoses: LVAD (left ventricular assist device) present (H); Chronic systolic congestive heart failure (H)      insulin aspart (NOVOLOG PEN) 100 UNIT/ML pen Prandial Dosin units per 7 grams of carbohydrate each Meal or Snack.  Only chart total amount of units given.  Do not give if pre-prandial glucose is less than 60 mg/dL. If given at mealtime, administer within 30 minutes of start of meal.  Qty: 3 mL, Refills: 1    Associated Diagnoses: Acute on chronic systolic heart failure (H)      insulin glargine (LANTUS SOLOSTAR PEN) 100 UNIT/ML pen Inject 24 Units Subcutaneous At Bedtime  Qty: 3 mL, Refills: 11    Associated Diagnoses: Type 2 diabetes mellitus with hyperglycemia, with long-term current use of insulin (H)      oxyCODONE (ROXICODONE) 5 MG tablet Take 5 mg by mouth      rosuvastatin (CRESTOR) 20 MG tablet Take 1 tablet (20 mg) by mouth At Bedtime  Qty: 90 tablet, Refills: 0    Associated Diagnoses: Acute systolic heart failure (H)      tamsulosin (FLOMAX) 0.4 MG capsule       warfarin ANTICOAGULANT (COUMADIN) 2.5 MG tablet Take 2 tablets daily or as directed by coumadin clinic.  Qty: 60 tablet, Refills: 3    Associated Diagnoses: LVAD (left ventricular assist device) present (H); Heart failure (H)      nitroGLYcerin (NITROSTAT) 0.4 MG  sublingual tablet Place 1 tablet (0.4 mg) under the tongue every 5 minutes as needed for chest pain For chest pain place 1 tablet under the tongue every 5 minutes for 3 doses. If symptoms persist 5 minutes after 1st dose call 911.  Qty: 10 tablet, Refills: 0    Associated Diagnoses: Acute on chronic systolic heart failure (H)         STOP taking these medications       losartan (COZAAR) 25 MG tablet Comments:   Reason for Stopping:               Labs or imaging requiring follow-up after discharge:  Follow up BMP in 1-2 weeks      Follow-up:  Follow up with [facility]    Code status:  Full    Patient Care Team:  Patricia Irving MD as PCP - General  Antonia Byrne APRN CNP as Nurse Practitioner (Cardiology)  Jenni Ortiz MD as MD (Cardiology)  Marcy Shahid, RN as Registered Nurse  Megan Ibarra MD as MD (Urology)  Nina Gutierrez, RN as Specialty Care Coordinator (Urology)    Josue Foley MD  Internal Medicine PGY3  [phone]

## 2020-05-17 ENCOUNTER — ORGAN (OUTPATIENT)
Dept: TRANSPLANT | Facility: CLINIC | Age: 60
End: 2020-05-17

## 2020-05-17 ENCOUNTER — APPOINTMENT (OUTPATIENT)
Dept: GENERAL RADIOLOGY | Facility: CLINIC | Age: 60
DRG: 001 | End: 2020-05-17
Attending: THORACIC SURGERY (CARDIOTHORACIC VASCULAR SURGERY)
Payer: COMMERCIAL

## 2020-05-17 ENCOUNTER — DOCUMENTATION ONLY (OUTPATIENT)
Dept: TRANSPLANT | Facility: CLINIC | Age: 60
End: 2020-05-17

## 2020-05-17 ENCOUNTER — HOSPITAL ENCOUNTER (INPATIENT)
Facility: CLINIC | Age: 60
LOS: 11 days | Discharge: HOME | DRG: 001 | End: 2020-05-29
Attending: THORACIC SURGERY (CARDIOTHORACIC VASCULAR SURGERY) | Admitting: THORACIC SURGERY (CARDIOTHORACIC VASCULAR SURGERY)
Payer: COMMERCIAL

## 2020-05-17 ENCOUNTER — PATIENT OUTREACH (OUTPATIENT)
Dept: CARE COORDINATION | Facility: CLINIC | Age: 60
End: 2020-05-17

## 2020-05-17 DIAGNOSIS — Z76.82 AWAITING ORGAN TRANSPLANT: Primary | ICD-10-CM

## 2020-05-17 DIAGNOSIS — Z94.1 STATUS POST HEART TRANSPLANTATION (H): Primary | ICD-10-CM

## 2020-05-17 DIAGNOSIS — Z94.1 STATUS POST HEART TRANSPLANTATION (H): ICD-10-CM

## 2020-05-17 DIAGNOSIS — I50.82 BIVENTRICULAR HEART FAILURE (H): ICD-10-CM

## 2020-05-17 DIAGNOSIS — Z94.1 HEART REPLACED BY TRANSPLANT (H): ICD-10-CM

## 2020-05-17 PROCEDURE — 71045 X-RAY EXAM CHEST 1 VIEW: CPT

## 2020-05-17 RX ORDER — CEFAZOLIN SODIUM 1 G/3ML
1 INJECTION, POWDER, FOR SOLUTION INTRAMUSCULAR; INTRAVENOUS SEE ADMIN INSTRUCTIONS
Status: DISCONTINUED | OUTPATIENT
Start: 2020-05-17 | End: 2020-05-18 | Stop reason: HOSPADM

## 2020-05-17 RX ORDER — CEFAZOLIN SODIUM 2 G/100ML
2 INJECTION, SOLUTION INTRAVENOUS
Status: COMPLETED | OUTPATIENT
Start: 2020-05-17 | End: 2020-05-18

## 2020-05-17 RX ORDER — MYCOPHENOLATE MOFETIL 250 MG/1
1500 CAPSULE ORAL EVERY 12 HOURS
Status: DISCONTINUED | OUTPATIENT
Start: 2020-05-18 | End: 2020-05-18 | Stop reason: HOSPADM

## 2020-05-17 RX ORDER — LIDOCAINE 40 MG/G
CREAM TOPICAL
Status: DISCONTINUED | OUTPATIENT
Start: 2020-05-17 | End: 2020-05-18 | Stop reason: HOSPADM

## 2020-05-17 RX ORDER — ACETAMINOPHEN 325 MG/1
975 TABLET ORAL
Status: COMPLETED | OUTPATIENT
Start: 2020-05-17 | End: 2020-05-18

## 2020-05-18 ENCOUNTER — TELEPHONE (OUTPATIENT)
Dept: TRANSPLANT | Facility: CLINIC | Age: 60
End: 2020-05-18

## 2020-05-18 ENCOUNTER — DOCUMENTATION ONLY (OUTPATIENT)
Dept: OTHER | Facility: CLINIC | Age: 60
End: 2020-05-18

## 2020-05-18 ENCOUNTER — ANCILLARY PROCEDURE (OUTPATIENT)
Dept: CARDIOLOGY | Facility: CLINIC | Age: 60
DRG: 001 | End: 2020-05-18
Attending: INTERNAL MEDICINE
Payer: COMMERCIAL

## 2020-05-18 ENCOUNTER — APPOINTMENT (OUTPATIENT)
Dept: GENERAL RADIOLOGY | Facility: CLINIC | Age: 60
DRG: 001 | End: 2020-05-18
Attending: SURGERY
Payer: COMMERCIAL

## 2020-05-18 ENCOUNTER — ANESTHESIA (OUTPATIENT)
Dept: SURGERY | Facility: CLINIC | Age: 60
DRG: 001 | End: 2020-05-18
Payer: COMMERCIAL

## 2020-05-18 ENCOUNTER — RESULTS ONLY (OUTPATIENT)
Dept: OTHER | Facility: CLINIC | Age: 60
End: 2020-05-18

## 2020-05-18 ENCOUNTER — ANESTHESIA EVENT (OUTPATIENT)
Dept: SURGERY | Facility: CLINIC | Age: 60
DRG: 001 | End: 2020-05-18
Payer: COMMERCIAL

## 2020-05-18 PROBLEM — I50.9 HEART FAILURE (H): Status: ACTIVE | Noted: 2018-07-16

## 2020-05-18 LAB
ABO + RH BLD: NORMAL
ABO + RH BLD: NORMAL
ALBUMIN SERPL-MCNC: 2.8 G/DL (ref 3.4–5)
ALBUMIN SERPL-MCNC: 3.4 G/DL (ref 3.4–5)
ALBUMIN SERPL-MCNC: 3.4 G/DL (ref 3.4–5)
ALBUMIN UR-MCNC: 30 MG/DL
ALP SERPL-CCNC: 34 U/L (ref 40–150)
ALP SERPL-CCNC: 51 U/L (ref 40–150)
ALP SERPL-CCNC: 54 U/L (ref 40–150)
ALT SERPL W P-5'-P-CCNC: 30 U/L (ref 0–70)
ALT SERPL W P-5'-P-CCNC: 32 U/L (ref 0–70)
ALT SERPL W P-5'-P-CCNC: 33 U/L (ref 0–70)
AMYLASE SERPL-CCNC: 56 U/L (ref 30–110)
AMYLASE SERPL-CCNC: 65 U/L (ref 30–110)
ANGLE RATE OF CLOT GROWTH: 71.7 DEG (ref 59–74)
ANGLE RATE OF CLOT GROWTH: 71.8 DEG (ref 59–74)
ANGLE RATE OF CLOT GROWTH: 74.6 DEG (ref 59–74)
ANGLE RATE OF CLOT GROWTH: 76.2 DEG (ref 59–74)
ANION GAP SERPL CALCULATED.3IONS-SCNC: 10 MMOL/L (ref 3–14)
ANION GAP SERPL CALCULATED.3IONS-SCNC: 7 MMOL/L (ref 3–14)
ANION GAP SERPL CALCULATED.3IONS-SCNC: 8 MMOL/L (ref 3–14)
ANION GAP SERPL CALCULATED.3IONS-SCNC: 8 MMOL/L (ref 3–14)
APPEARANCE UR: ABNORMAL
APTT PPP: 31 SEC (ref 22–37)
APTT PPP: 31 SEC (ref 22–37)
APTT PPP: 34 SEC (ref 22–37)
APTT PPP: 38 SEC (ref 22–37)
AST SERPL W P-5'-P-CCNC: 112 U/L (ref 0–45)
AST SERPL W P-5'-P-CCNC: 25 U/L (ref 0–45)
AST SERPL W P-5'-P-CCNC: 30 U/L (ref 0–45)
BACTERIA SPEC CULT: NO GROWTH
BACTERIA SPEC CULT: NO GROWTH
BASE DEFICIT BLDA-SCNC: 1.9 MMOL/L
BASE DEFICIT BLDA-SCNC: 2.3 MMOL/L
BASE DEFICIT BLDA-SCNC: 2.4 MMOL/L
BASE DEFICIT BLDA-SCNC: 3.3 MMOL/L
BASE DEFICIT BLDA-SCNC: 4.4 MMOL/L
BASE DEFICIT BLDA-SCNC: 6.4 MMOL/L
BASE DEFICIT BLDA-SCNC: 8 MMOL/L
BASE DEFICIT BLDA-SCNC: 9.2 MMOL/L
BASE DEFICIT BLDV-SCNC: 1.7 MMOL/L
BASE DEFICIT BLDV-SCNC: 4.6 MMOL/L
BASOPHILS # BLD AUTO: 0 10E9/L (ref 0–0.2)
BASOPHILS # BLD AUTO: 0.1 10E9/L (ref 0–0.2)
BASOPHILS NFR BLD AUTO: 0.3 %
BASOPHILS NFR BLD AUTO: 0.8 %
BILIRUB SERPL-MCNC: 0.7 MG/DL (ref 0.2–1.3)
BILIRUB SERPL-MCNC: 1 MG/DL (ref 0.2–1.3)
BILIRUB SERPL-MCNC: 1.5 MG/DL (ref 0.2–1.3)
BILIRUB UR QL STRIP: NEGATIVE
BLD GP AB SCN SERPL QL: NORMAL
BLD PROD TYP BPU: NORMAL
BLD UNIT ID BPU: 0
BLOOD BANK CMNT PATIENT-IMP: NORMAL
BLOOD PRODUCT CODE: NORMAL
BPU ID: NORMAL
BUN SERPL-MCNC: 36 MG/DL (ref 7–30)
BUN SERPL-MCNC: 39 MG/DL (ref 7–30)
BUN SERPL-MCNC: 45 MG/DL (ref 7–30)
BUN SERPL-MCNC: 45 MG/DL (ref 7–30)
CA-I BLD-MCNC: 4 MG/DL (ref 4.4–5.2)
CA-I BLD-MCNC: 4.1 MG/DL (ref 4.4–5.2)
CA-I BLD-MCNC: 4.2 MG/DL (ref 4.4–5.2)
CA-I BLD-MCNC: 4.7 MG/DL (ref 4.4–5.2)
CA-I BLD-MCNC: 5 MG/DL (ref 4.4–5.2)
CA-I BLD-MCNC: 6 MG/DL (ref 4.4–5.2)
CALCIUM SERPL-MCNC: 7.5 MG/DL (ref 8.5–10.1)
CALCIUM SERPL-MCNC: 8.1 MG/DL (ref 8.5–10.1)
CALCIUM SERPL-MCNC: 8.7 MG/DL (ref 8.5–10.1)
CALCIUM SERPL-MCNC: 9.1 MG/DL (ref 8.5–10.1)
CHLORIDE SERPL-SCNC: 109 MMOL/L (ref 94–109)
CHLORIDE SERPL-SCNC: 109 MMOL/L (ref 94–109)
CHLORIDE SERPL-SCNC: 110 MMOL/L (ref 94–109)
CHLORIDE SERPL-SCNC: 112 MMOL/L (ref 94–109)
CI HYPERCOAGULATION INDEX: 2.3 RATIO (ref 0–3)
CI HYPERCOAGULATION INDEX: 3.9 RATIO (ref 0–3)
CI HYPERCOAGULATION INDEX: 4.5 RATIO (ref 0–3)
CI HYPERCOAGULATION INDEX: 5 RATIO (ref 0–3)
CLOT LYSIS 30M P MA LENFR BLD TEG: 0 % (ref 0–8)
CLOT STRENGTH BLD TEG: 10.7 KD/SC (ref 5.3–13.2)
CLOT STRENGTH BLD TEG: 13.6 KD/SC (ref 5.3–13.2)
CLOT STRENGTH BLD TEG: 14.3 KD/SC (ref 5.3–13.2)
CLOT STRENGTH BLD TEG: 19.7 KD/SC (ref 5.3–13.2)
CMV IGG SERPL QL IA: 0.3 AI (ref 0–0.8)
CO2 SERPL-SCNC: 18 MMOL/L (ref 20–32)
CO2 SERPL-SCNC: 21 MMOL/L (ref 20–32)
CO2 SERPL-SCNC: 22 MMOL/L (ref 20–32)
CO2 SERPL-SCNC: 24 MMOL/L (ref 20–32)
COLOR UR AUTO: YELLOW
CREAT SERPL-MCNC: 1.85 MG/DL (ref 0.66–1.25)
CREAT SERPL-MCNC: 1.9 MG/DL (ref 0.66–1.25)
CREAT SERPL-MCNC: 1.95 MG/DL (ref 0.66–1.25)
CREAT SERPL-MCNC: 2.1 MG/DL (ref 0.66–1.25)
DIFFERENTIAL METHOD BLD: ABNORMAL
DIFFERENTIAL METHOD BLD: ABNORMAL
EBV VCA IGG SER QL IA: >8 AI (ref 0–0.8)
EOSINOPHIL # BLD AUTO: 0 10E9/L (ref 0–0.7)
EOSINOPHIL # BLD AUTO: 0.3 10E9/L (ref 0–0.7)
EOSINOPHIL NFR BLD AUTO: 0.2 %
EOSINOPHIL NFR BLD AUTO: 3.5 %
ERYTHROCYTE [DISTWIDTH] IN BLOOD BY AUTOMATED COUNT: 15.4 % (ref 10–15)
ERYTHROCYTE [DISTWIDTH] IN BLOOD BY AUTOMATED COUNT: 15.4 % (ref 10–15)
ERYTHROCYTE [DISTWIDTH] IN BLOOD BY AUTOMATED COUNT: 15.8 % (ref 10–15)
ERYTHROCYTE [DISTWIDTH] IN BLOOD BY AUTOMATED COUNT: 15.9 % (ref 10–15)
FIBRINOGEN PPP-MCNC: 279 MG/DL (ref 200–420)
GFR SERPL CREATININE-BSD FRML MDRD: 34 ML/MIN/{1.73_M2}
GFR SERPL CREATININE-BSD FRML MDRD: 37 ML/MIN/{1.73_M2}
GFR SERPL CREATININE-BSD FRML MDRD: 38 ML/MIN/{1.73_M2}
GFR SERPL CREATININE-BSD FRML MDRD: 39 ML/MIN/{1.73_M2}
GLUCOSE BLD-MCNC: 179 MG/DL (ref 70–99)
GLUCOSE BLD-MCNC: 186 MG/DL (ref 70–99)
GLUCOSE BLD-MCNC: 188 MG/DL (ref 70–99)
GLUCOSE BLD-MCNC: 220 MG/DL (ref 70–99)
GLUCOSE BLD-MCNC: 231 MG/DL (ref 70–99)
GLUCOSE BLD-MCNC: 274 MG/DL (ref 70–99)
GLUCOSE BLD-MCNC: 286 MG/DL (ref 70–99)
GLUCOSE BLD-MCNC: 301 MG/DL (ref 70–99)
GLUCOSE BLDC GLUCOMTR-MCNC: 114 MG/DL (ref 70–99)
GLUCOSE BLDC GLUCOMTR-MCNC: 163 MG/DL (ref 70–99)
GLUCOSE BLDC GLUCOMTR-MCNC: 177 MG/DL (ref 70–99)
GLUCOSE BLDC GLUCOMTR-MCNC: 180 MG/DL (ref 70–99)
GLUCOSE BLDC GLUCOMTR-MCNC: 224 MG/DL (ref 70–99)
GLUCOSE BLDC GLUCOMTR-MCNC: 74 MG/DL (ref 70–99)
GLUCOSE BLDC GLUCOMTR-MCNC: 98 MG/DL (ref 70–99)
GLUCOSE SERPL-MCNC: 115 MG/DL (ref 70–99)
GLUCOSE SERPL-MCNC: 130 MG/DL (ref 70–99)
GLUCOSE SERPL-MCNC: 169 MG/DL (ref 70–99)
GLUCOSE SERPL-MCNC: 247 MG/DL (ref 70–99)
GLUCOSE UR STRIP-MCNC: NEGATIVE MG/DL
GRAM STN SPEC: NORMAL
GRAM STN SPEC: NORMAL
HCO3 BLD-SCNC: 17 MMOL/L (ref 21–28)
HCO3 BLD-SCNC: 18 MMOL/L (ref 21–28)
HCO3 BLD-SCNC: 18 MMOL/L (ref 21–28)
HCO3 BLD-SCNC: 22 MMOL/L (ref 21–28)
HCO3 BLD-SCNC: 22 MMOL/L (ref 21–28)
HCO3 BLD-SCNC: 23 MMOL/L (ref 21–28)
HCO3 BLD-SCNC: 23 MMOL/L (ref 21–28)
HCO3 BLD-SCNC: 24 MMOL/L (ref 21–28)
HCO3 BLDV-SCNC: 22 MMOL/L (ref 21–28)
HCO3 BLDV-SCNC: 24 MMOL/L (ref 21–28)
HCT VFR BLD AUTO: 28.4 % (ref 40–53)
HCT VFR BLD AUTO: 29.9 % (ref 40–53)
HCT VFR BLD AUTO: 36.7 % (ref 40–53)
HCT VFR BLD AUTO: 39.2 % (ref 40–53)
HGB BLD-MCNC: 11.1 G/DL (ref 13.3–17.7)
HGB BLD-MCNC: 11.3 G/DL (ref 13.3–17.7)
HGB BLD-MCNC: 11.5 G/DL (ref 13.3–17.7)
HGB BLD-MCNC: 11.5 G/DL (ref 13.3–17.7)
HGB BLD-MCNC: 12 G/DL (ref 13.3–17.7)
HGB BLD-MCNC: 8.4 G/DL (ref 13.3–17.7)
HGB BLD-MCNC: 8.7 G/DL (ref 13.3–17.7)
HGB BLD-MCNC: 8.8 G/DL (ref 13.3–17.7)
HGB BLD-MCNC: 8.9 G/DL (ref 13.3–17.7)
HGB BLD-MCNC: 8.9 G/DL (ref 13.3–17.7)
HGB BLD-MCNC: 9.1 G/DL (ref 13.3–17.7)
HGB BLD-MCNC: 9.6 G/DL (ref 13.3–17.7)
HGB UR QL STRIP: ABNORMAL
HIV 1+2 AB+HIV1 P24 AG SERPL QL IA: NONREACTIVE
IMM GRANULOCYTES # BLD: 0 10E9/L (ref 0–0.4)
IMM GRANULOCYTES # BLD: 0 10E9/L (ref 0–0.4)
IMM GRANULOCYTES NFR BLD: 0.2 %
IMM GRANULOCYTES NFR BLD: 0.4 %
INR PPP: 1.42 (ref 0.86–1.14)
INR PPP: 1.54 (ref 0.86–1.14)
INR PPP: 2.19 (ref 0.86–1.14)
INR PPP: 2.21 (ref 0.86–1.14)
INTERPRETATION ECG - MUSE: NORMAL
K TIME TO SPEC CLOT STRENGTH: 0.9 MIN (ref 1–3)
K TIME TO SPEC CLOT STRENGTH: 1.1 MIN (ref 1–3)
K TIME TO SPEC CLOT STRENGTH: 1.2 MIN (ref 1–3)
K TIME TO SPEC CLOT STRENGTH: 1.3 MIN (ref 1–3)
KETONES UR STRIP-MCNC: NEGATIVE MG/DL
LACTATE BLD-SCNC: 1.1 MMOL/L (ref 0.7–2)
LACTATE BLD-SCNC: 1.5 MMOL/L (ref 0.7–2)
LACTATE BLD-SCNC: 1.6 MMOL/L (ref 0.7–2)
LACTATE BLD-SCNC: 1.7 MMOL/L (ref 0.7–2)
LACTATE BLD-SCNC: 1.9 MMOL/L (ref 0.7–2)
LACTATE BLD-SCNC: 2 MMOL/L (ref 0.7–2)
LACTATE BLD-SCNC: 2.3 MMOL/L (ref 0.7–2)
LACTATE BLD-SCNC: 2.4 MMOL/L (ref 0.7–2)
LACTATE BLD-SCNC: 3.1 MMOL/L (ref 0.7–2)
LEUKOCYTE ESTERASE UR QL STRIP: NEGATIVE
LY60 LYSIS AT 60 MINUTES: 0 % (ref 0–15)
LY60 LYSIS AT 60 MINUTES: 0.4 % (ref 0–15)
LY60 LYSIS AT 60 MINUTES: 2.5 % (ref 0–15)
LY60 LYSIS AT 60 MINUTES: 2.8 % (ref 0–15)
LYMPHOCYTES # BLD AUTO: 0.7 10E9/L (ref 0.8–5.3)
LYMPHOCYTES # BLD AUTO: 1.4 10E9/L (ref 0.8–5.3)
LYMPHOCYTES NFR BLD AUTO: 10.6 %
LYMPHOCYTES NFR BLD AUTO: 18.5 %
Lab: NORMAL
Lab: NORMAL
MA MAXIMUM CLOT STRENGTH: 68.2 MM (ref 55–74)
MA MAXIMUM CLOT STRENGTH: 73.1 MM (ref 55–74)
MA MAXIMUM CLOT STRENGTH: 74.1 MM (ref 55–74)
MA MAXIMUM CLOT STRENGTH: 79.8 MM (ref 55–74)
MAGNESIUM SERPL-MCNC: 1.8 MG/DL (ref 1.6–2.3)
MAGNESIUM SERPL-MCNC: 1.9 MG/DL (ref 1.6–2.3)
MAGNESIUM SERPL-MCNC: 2.8 MG/DL (ref 1.6–2.3)
MCH RBC QN AUTO: 25.5 PG (ref 26.5–33)
MCH RBC QN AUTO: 25.5 PG (ref 26.5–33)
MCH RBC QN AUTO: 25.8 PG (ref 26.5–33)
MCH RBC QN AUTO: 26.3 PG (ref 26.5–33)
MCHC RBC AUTO-ENTMCNC: 30.2 G/DL (ref 31.5–36.5)
MCHC RBC AUTO-ENTMCNC: 30.6 G/DL (ref 31.5–36.5)
MCHC RBC AUTO-ENTMCNC: 31 G/DL (ref 31.5–36.5)
MCHC RBC AUTO-ENTMCNC: 32.1 G/DL (ref 31.5–36.5)
MCV RBC AUTO: 82 FL (ref 78–100)
MCV RBC AUTO: 82 FL (ref 78–100)
MCV RBC AUTO: 84 FL (ref 78–100)
MCV RBC AUTO: 84 FL (ref 78–100)
MONOCYTES # BLD AUTO: 0.1 10E9/L (ref 0–1.3)
MONOCYTES # BLD AUTO: 0.8 10E9/L (ref 0–1.3)
MONOCYTES NFR BLD AUTO: 1.1 %
MONOCYTES NFR BLD AUTO: 11.2 %
MUCOUS THREADS #/AREA URNS LPF: PRESENT /LPF
NEUTROPHILS # BLD AUTO: 4.9 10E9/L (ref 1.6–8.3)
NEUTROPHILS # BLD AUTO: 5.4 10E9/L (ref 1.6–8.3)
NEUTROPHILS NFR BLD AUTO: 65.6 %
NEUTROPHILS NFR BLD AUTO: 87.6 %
NITRATE UR QL: NEGATIVE
NRBC # BLD AUTO: 0 10*3/UL
NRBC # BLD AUTO: 0 10*3/UL
NRBC BLD AUTO-RTO: 0 /100
NRBC BLD AUTO-RTO: 0 /100
NUM BPU REQUESTED: 2
NUM BPU REQUESTED: 4
O2/TOTAL GAS SETTING VFR VENT: 100 %
O2/TOTAL GAS SETTING VFR VENT: 50 %
O2/TOTAL GAS SETTING VFR VENT: 70 %
O2/TOTAL GAS SETTING VFR VENT: 80 %
O2/TOTAL GAS SETTING VFR VENT: 80 %
OXYHGB MFR BLDV: 78 %
PCO2 BLD: 33 MM HG (ref 35–45)
PCO2 BLD: 33 MM HG (ref 35–45)
PCO2 BLD: 40 MM HG (ref 35–45)
PCO2 BLD: 40 MM HG (ref 35–45)
PCO2 BLD: 43 MM HG (ref 35–45)
PCO2 BLD: 45 MM HG (ref 35–45)
PCO2 BLD: 48 MM HG (ref 35–45)
PCO2 BLD: 49 MM HG (ref 35–45)
PCO2 BLDV: 41 MM HG (ref 40–50)
PCO2 BLDV: 49 MM HG (ref 40–50)
PH BLD: 7.25 PH (ref 7.35–7.45)
PH BLD: 7.27 PH (ref 7.35–7.45)
PH BLD: 7.27 PH (ref 7.35–7.45)
PH BLD: 7.28 PH (ref 7.35–7.45)
PH BLD: 7.33 PH (ref 7.35–7.45)
PH BLD: 7.35 PH (ref 7.35–7.45)
PH BLD: 7.35 PH (ref 7.35–7.45)
PH BLD: 7.43 PH (ref 7.35–7.45)
PH BLDV: 7.27 PH (ref 7.32–7.43)
PH BLDV: 7.37 PH (ref 7.32–7.43)
PH UR STRIP: 5 PH (ref 5–7)
PHOSPHATE SERPL-MCNC: 2.2 MG/DL (ref 2.5–4.5)
PHOSPHATE SERPL-MCNC: 3.1 MG/DL (ref 2.5–4.5)
PHOSPHATE SERPL-MCNC: 3.4 MG/DL (ref 2.5–4.5)
PLATELET # BLD AUTO: 164 10E9/L (ref 150–450)
PLATELET # BLD AUTO: 185 10E9/L (ref 150–450)
PLATELET # BLD AUTO: 192 10E9/L (ref 150–450)
PLATELET # BLD AUTO: 243 10E9/L (ref 150–450)
PLATELET # BLD AUTO: 251 10E9/L (ref 150–450)
PO2 BLD: 158 MM HG (ref 80–105)
PO2 BLD: 170 MM HG (ref 80–105)
PO2 BLD: 278 MM HG (ref 80–105)
PO2 BLD: 323 MM HG (ref 80–105)
PO2 BLD: 355 MM HG (ref 80–105)
PO2 BLD: 394 MM HG (ref 80–105)
PO2 BLD: 457 MM HG (ref 80–105)
PO2 BLD: 70 MM HG (ref 80–105)
PO2 BLDV: 43 MM HG (ref 25–47)
PO2 BLDV: 46 MM HG (ref 25–47)
POTASSIUM BLD-SCNC: 3.6 MMOL/L (ref 3.4–5.3)
POTASSIUM BLD-SCNC: 3.8 MMOL/L (ref 3.4–5.3)
POTASSIUM BLD-SCNC: 3.9 MMOL/L (ref 3.4–5.3)
POTASSIUM BLD-SCNC: 4.1 MMOL/L (ref 3.4–5.3)
POTASSIUM BLD-SCNC: 4.1 MMOL/L (ref 3.4–5.3)
POTASSIUM BLD-SCNC: 4.2 MMOL/L (ref 3.4–5.3)
POTASSIUM BLD-SCNC: 4.6 MMOL/L (ref 3.4–5.3)
POTASSIUM BLD-SCNC: 5.3 MMOL/L (ref 3.4–5.3)
POTASSIUM SERPL-SCNC: 3.7 MMOL/L (ref 3.4–5.3)
POTASSIUM SERPL-SCNC: 3.9 MMOL/L (ref 3.4–5.3)
POTASSIUM SERPL-SCNC: 4.2 MMOL/L (ref 3.4–5.3)
POTASSIUM SERPL-SCNC: 4.8 MMOL/L (ref 3.4–5.3)
PROT SERPL-MCNC: 5.4 G/DL (ref 6.8–8.8)
PROT SERPL-MCNC: 6.7 G/DL (ref 6.8–8.8)
PROT SERPL-MCNC: 7.3 G/DL (ref 6.8–8.8)
PSA SERPL-MCNC: 8.21 UG/L (ref 0–4)
R TIME UNTIL CLOT FORMS: 1.9 MIN (ref 4–9)
R TIME UNTIL CLOT FORMS: 2.5 MIN (ref 4–9)
R TIME UNTIL CLOT FORMS: 4.5 MIN (ref 4–9)
R TIME UNTIL CLOT FORMS: 6.2 MIN (ref 4–9)
RBC # BLD AUTO: 3.45 10E12/L (ref 4.4–5.9)
RBC # BLD AUTO: 3.65 10E12/L (ref 4.4–5.9)
RBC # BLD AUTO: 4.36 10E12/L (ref 4.4–5.9)
RBC # BLD AUTO: 4.66 10E12/L (ref 4.4–5.9)
RBC #/AREA URNS AUTO: >182 /HPF (ref 0–2)
SARS-COV-2 PCR COMMENT: NORMAL
SARS-COV-2 RNA SPEC QL NAA+PROBE: NEGATIVE
SARS-COV-2 RNA SPEC QL NAA+PROBE: NORMAL
SODIUM BLD-SCNC: 137 MMOL/L (ref 133–144)
SODIUM BLD-SCNC: 139 MMOL/L (ref 133–144)
SODIUM BLD-SCNC: 140 MMOL/L (ref 133–144)
SODIUM BLD-SCNC: 140 MMOL/L (ref 133–144)
SODIUM BLD-SCNC: 142 MMOL/L (ref 133–144)
SODIUM BLD-SCNC: 142 MMOL/L (ref 133–144)
SODIUM SERPL-SCNC: 136 MMOL/L (ref 133–144)
SODIUM SERPL-SCNC: 139 MMOL/L (ref 133–144)
SODIUM SERPL-SCNC: 141 MMOL/L (ref 133–144)
SODIUM SERPL-SCNC: 141 MMOL/L (ref 133–144)
SOURCE: ABNORMAL
SP GR UR STRIP: 1.02 (ref 1–1.03)
SPECIMEN EXP DATE BLD: NORMAL
SPECIMEN SOURCE: NORMAL
T GONDII IGG SER QL: <3 IU/ML (ref 0–7.1)
TRANSFUSION STATUS PATIENT QL: NORMAL
UROBILINOGEN UR STRIP-MCNC: NORMAL MG/DL (ref 0–2)
WBC # BLD AUTO: 13 10E9/L (ref 4–11)
WBC # BLD AUTO: 20.1 10E9/L (ref 4–11)
WBC # BLD AUTO: 6.2 10E9/L (ref 4–11)
WBC # BLD AUTO: 7.5 10E9/L (ref 4–11)
WBC #/AREA URNS AUTO: 7 /HPF (ref 0–5)

## 2020-05-18 PROCEDURE — 25000125 ZZHC RX 250: Performed by: THORACIC SURGERY (CARDIOTHORACIC VASCULAR SURGERY)

## 2020-05-18 PROCEDURE — 82330 ASSAY OF CALCIUM: CPT

## 2020-05-18 PROCEDURE — 80053 COMPREHEN METABOLIC PANEL: CPT | Performed by: INTERNAL MEDICINE

## 2020-05-18 PROCEDURE — 85730 THROMBOPLASTIN TIME PARTIAL: CPT | Performed by: SURGERY

## 2020-05-18 PROCEDURE — 85027 COMPLETE CBC AUTOMATED: CPT | Performed by: SURGERY

## 2020-05-18 PROCEDURE — 36000074 ZZH SURGERY LEVEL 6 1ST 30 MIN - UMMC: Performed by: THORACIC SURGERY (CARDIOTHORACIC VASCULAR SURGERY)

## 2020-05-18 PROCEDURE — 84154 ASSAY OF PSA FREE: CPT | Performed by: INTERNAL MEDICINE

## 2020-05-18 PROCEDURE — 84100 ASSAY OF PHOSPHORUS: CPT | Performed by: SURGERY

## 2020-05-18 PROCEDURE — 85610 PROTHROMBIN TIME: CPT | Performed by: SURGERY

## 2020-05-18 PROCEDURE — 88300 SURGICAL PATH GROSS: CPT | Performed by: THORACIC SURGERY (CARDIOTHORACIC VASCULAR SURGERY)

## 2020-05-18 PROCEDURE — 84153 ASSAY OF PSA TOTAL: CPT | Performed by: INTERNAL MEDICINE

## 2020-05-18 PROCEDURE — 85025 COMPLETE CBC W/AUTO DIFF WBC: CPT | Performed by: INTERNAL MEDICINE

## 2020-05-18 PROCEDURE — 87076 CULTURE ANAEROBE IDENT EACH: CPT | Performed by: THORACIC SURGERY (CARDIOTHORACIC VASCULAR SURGERY)

## 2020-05-18 PROCEDURE — 25000555 ZZHC RX FACTOR IP 250 OP 636: Performed by: STUDENT IN AN ORGANIZED HEALTH CARE EDUCATION/TRAINING PROGRAM

## 2020-05-18 PROCEDURE — 37000009 ZZH ANESTHESIA TECHNICAL FEE, EACH ADDTL 15 MIN: Performed by: THORACIC SURGERY (CARDIOTHORACIC VASCULAR SURGERY)

## 2020-05-18 PROCEDURE — 87102 FUNGUS ISOLATION CULTURE: CPT | Performed by: THORACIC SURGERY (CARDIOTHORACIC VASCULAR SURGERY)

## 2020-05-18 PROCEDURE — 25800030 ZZH RX IP 258 OP 636: Performed by: THORACIC SURGERY (CARDIOTHORACIC VASCULAR SURGERY)

## 2020-05-18 PROCEDURE — 86665 EPSTEIN-BARR CAPSID VCA: CPT | Performed by: SURGERY

## 2020-05-18 PROCEDURE — 81001 URINALYSIS AUTO W/SCOPE: CPT | Performed by: SURGERY

## 2020-05-18 PROCEDURE — 86923 COMPATIBILITY TEST ELECTRIC: CPT | Performed by: SURGERY

## 2020-05-18 PROCEDURE — 80053 COMPREHEN METABOLIC PANEL: CPT | Performed by: SURGERY

## 2020-05-18 PROCEDURE — 86900 BLOOD TYPING SEROLOGIC ABO: CPT | Performed by: SURGERY

## 2020-05-18 PROCEDURE — 93010 ELECTROCARDIOGRAM REPORT: CPT | Mod: 76 | Performed by: INTERNAL MEDICINE

## 2020-05-18 PROCEDURE — C9132 KCENTRA, PER I.U.: HCPCS | Performed by: STUDENT IN AN ORGANIZED HEALTH CARE EDUCATION/TRAINING PROGRAM

## 2020-05-18 PROCEDURE — 25000125 ZZHC RX 250: Performed by: NURSE ANESTHETIST, CERTIFIED REGISTERED

## 2020-05-18 PROCEDURE — 25000128 H RX IP 250 OP 636: Performed by: SURGERY

## 2020-05-18 PROCEDURE — 25800030 ZZH RX IP 258 OP 636: Performed by: SURGERY

## 2020-05-18 PROCEDURE — 99223 1ST HOSP IP/OBS HIGH 75: CPT | Performed by: INTERNAL MEDICINE

## 2020-05-18 PROCEDURE — 02PA0QZ REMOVAL OF IMPLANTABLE HEART ASSIST SYSTEM FROM HEART, OPEN APPROACH: ICD-10-PCS | Performed by: THORACIC SURGERY (CARDIOTHORACIC VASCULAR SURGERY)

## 2020-05-18 PROCEDURE — 93282 PRGRMG EVAL IMPLANTABLE DFB: CPT | Mod: 26 | Performed by: INTERNAL MEDICINE

## 2020-05-18 PROCEDURE — 25000128 H RX IP 250 OP 636: Performed by: THORACIC SURGERY (CARDIOTHORACIC VASCULAR SURGERY)

## 2020-05-18 PROCEDURE — 93282 PRGRMG EVAL IMPLANTABLE DFB: CPT

## 2020-05-18 PROCEDURE — 25000128 H RX IP 250 OP 636

## 2020-05-18 PROCEDURE — 82803 BLOOD GASES ANY COMBINATION: CPT | Performed by: SURGERY

## 2020-05-18 PROCEDURE — 25000132 ZZH RX MED GY IP 250 OP 250 PS 637: Performed by: SURGERY

## 2020-05-18 PROCEDURE — 87070 CULTURE OTHR SPECIMN AEROBIC: CPT | Performed by: THORACIC SURGERY (CARDIOTHORACIC VASCULAR SURGERY)

## 2020-05-18 PROCEDURE — 41000019 ZZH PERA-PERFUSION EACH ADDTL 15 MIN: Performed by: THORACIC SURGERY (CARDIOTHORACIC VASCULAR SURGERY)

## 2020-05-18 PROCEDURE — 86850 RBC ANTIBODY SCREEN: CPT | Performed by: SURGERY

## 2020-05-18 PROCEDURE — 25000555 ZZHC RX FACTOR IP 250 OP 636: Performed by: ANESTHESIOLOGY

## 2020-05-18 PROCEDURE — 85396 CLOTTING ASSAY WHOLE BLOOD: CPT | Performed by: INTERNAL MEDICINE

## 2020-05-18 PROCEDURE — 85610 PROTHROMBIN TIME: CPT | Performed by: INTERNAL MEDICINE

## 2020-05-18 PROCEDURE — 93005 ELECTROCARDIOGRAM TRACING: CPT

## 2020-05-18 PROCEDURE — 87075 CULTR BACTERIA EXCEPT BLOOD: CPT | Performed by: THORACIC SURGERY (CARDIOTHORACIC VASCULAR SURGERY)

## 2020-05-18 PROCEDURE — 87389 HIV-1 AG W/HIV-1&-2 AB AG IA: CPT | Performed by: SURGERY

## 2020-05-18 PROCEDURE — 83605 ASSAY OF LACTIC ACID: CPT | Performed by: SURGERY

## 2020-05-18 PROCEDURE — 40000986 XR CHEST PORT 1 VW

## 2020-05-18 PROCEDURE — 25800030 ZZH RX IP 258 OP 636: Performed by: NURSE ANESTHETIST, CERTIFIED REGISTERED

## 2020-05-18 PROCEDURE — 27210460 ZZH PUMP APP ADULT PERFUSION: Performed by: THORACIC SURGERY (CARDIOTHORACIC VASCULAR SURGERY)

## 2020-05-18 PROCEDURE — 83735 ASSAY OF MAGNESIUM: CPT | Performed by: SURGERY

## 2020-05-18 PROCEDURE — 82947 ASSAY GLUCOSE BLOOD QUANT: CPT

## 2020-05-18 PROCEDURE — 86777 TOXOPLASMA ANTIBODY: CPT | Performed by: INTERNAL MEDICINE

## 2020-05-18 PROCEDURE — 27210995 ZZH RX 272: Performed by: THORACIC SURGERY (CARDIOTHORACIC VASCULAR SURGERY)

## 2020-05-18 PROCEDURE — 40000344 ZZHCL STATISTIC THAWING COMPONENT: Performed by: INTERNAL MEDICINE

## 2020-05-18 PROCEDURE — 85610 PROTHROMBIN TIME: CPT | Performed by: THORACIC SURGERY (CARDIOTHORACIC VASCULAR SURGERY)

## 2020-05-18 PROCEDURE — 84132 ASSAY OF SERUM POTASSIUM: CPT

## 2020-05-18 PROCEDURE — 83735 ASSAY OF MAGNESIUM: CPT | Performed by: INTERNAL MEDICINE

## 2020-05-18 PROCEDURE — 5A1221Z PERFORMANCE OF CARDIAC OUTPUT, CONTINUOUS: ICD-10-PCS | Performed by: THORACIC SURGERY (CARDIOTHORACIC VASCULAR SURGERY)

## 2020-05-18 PROCEDURE — 36000076 ZZH SURGERY LEVEL 6 EA 15 ADDTL MIN - UMMC: Performed by: THORACIC SURGERY (CARDIOTHORACIC VASCULAR SURGERY)

## 2020-05-18 PROCEDURE — 82805 BLOOD GASES W/O2 SATURATION: CPT | Performed by: THORACIC SURGERY (CARDIOTHORACIC VASCULAR SURGERY)

## 2020-05-18 PROCEDURE — 87205 SMEAR GRAM STAIN: CPT | Performed by: THORACIC SURGERY (CARDIOTHORACIC VASCULAR SURGERY)

## 2020-05-18 PROCEDURE — 25000125 ZZHC RX 250: Performed by: ANESTHESIOLOGY

## 2020-05-18 PROCEDURE — 82150 ASSAY OF AMYLASE: CPT | Performed by: INTERNAL MEDICINE

## 2020-05-18 PROCEDURE — 02PA0MZ REMOVAL OF CARDIAC LEAD FROM HEART, OPEN APPROACH: ICD-10-PCS | Performed by: THORACIC SURGERY (CARDIOTHORACIC VASCULAR SURGERY)

## 2020-05-18 PROCEDURE — 40000986 XR ABDOMEN PORT 1 VW

## 2020-05-18 PROCEDURE — 85049 AUTOMATED PLATELET COUNT: CPT | Performed by: INTERNAL MEDICINE

## 2020-05-18 PROCEDURE — C9132 KCENTRA, PER I.U.: HCPCS | Performed by: ANESTHESIOLOGY

## 2020-05-18 PROCEDURE — 87635 SARS-COV-2 COVID-19 AMP PRB: CPT | Performed by: SURGERY

## 2020-05-18 PROCEDURE — 00000146 ZZHCL STATISTIC GLUCOSE BY METER IP

## 2020-05-18 PROCEDURE — 86901 BLOOD TYPING SEROLOGIC RH(D): CPT | Performed by: SURGERY

## 2020-05-18 PROCEDURE — 25800030 ZZH RX IP 258 OP 636: Performed by: ANESTHESIOLOGY

## 2020-05-18 PROCEDURE — 27210447 ZZH PACK CELL SAVER CSP: Performed by: THORACIC SURGERY (CARDIOTHORACIC VASCULAR SURGERY)

## 2020-05-18 PROCEDURE — 81200010 ZZH ACQUISITION HEART CADAVER

## 2020-05-18 PROCEDURE — 27210794 ZZH OR GENERAL SUPPLY STERILE: Performed by: THORACIC SURGERY (CARDIOTHORACIC VASCULAR SURGERY)

## 2020-05-18 PROCEDURE — 36415 COLL VENOUS BLD VENIPUNCTURE: CPT | Performed by: INTERNAL MEDICINE

## 2020-05-18 PROCEDURE — 25000565 ZZH ISOFLURANE, EA 15 MIN: Performed by: THORACIC SURGERY (CARDIOTHORACIC VASCULAR SURGERY)

## 2020-05-18 PROCEDURE — C9113 INJ PANTOPRAZOLE SODIUM, VIA: HCPCS | Performed by: SURGERY

## 2020-05-18 PROCEDURE — 82803 BLOOD GASES ANY COMBINATION: CPT

## 2020-05-18 PROCEDURE — 86644 CMV ANTIBODY: CPT | Performed by: SURGERY

## 2020-05-18 PROCEDURE — 25000128 H RX IP 250 OP 636: Performed by: ANESTHESIOLOGY

## 2020-05-18 PROCEDURE — P9041 ALBUMIN (HUMAN),5%, 50ML: HCPCS

## 2020-05-18 PROCEDURE — 99207 CARDIAC DEVICE CHECK - INPATIENT: CPT | Mod: 26 | Performed by: INTERNAL MEDICINE

## 2020-05-18 PROCEDURE — 82150 ASSAY OF AMYLASE: CPT | Performed by: SURGERY

## 2020-05-18 PROCEDURE — 84295 ASSAY OF SERUM SODIUM: CPT

## 2020-05-18 PROCEDURE — 85025 COMPLETE CBC W/AUTO DIFF WBC: CPT | Performed by: SURGERY

## 2020-05-18 PROCEDURE — 40000171 ZZH STATISTIC PRE-PROCEDURE ASSESSMENT III: Performed by: THORACIC SURGERY (CARDIOTHORACIC VASCULAR SURGERY)

## 2020-05-18 PROCEDURE — 20000004 ZZH R&B ICU UMMC

## 2020-05-18 PROCEDURE — 02YA0Z0 TRANSPLANTATION OF HEART, ALLOGENEIC, OPEN APPROACH: ICD-10-PCS | Performed by: THORACIC SURGERY (CARDIOTHORACIC VASCULAR SURGERY)

## 2020-05-18 PROCEDURE — 25000128 H RX IP 250 OP 636: Performed by: STUDENT IN AN ORGANIZED HEALTH CARE EDUCATION/TRAINING PROGRAM

## 2020-05-18 PROCEDURE — 85730 THROMBOPLASTIN TIME PARTIAL: CPT | Performed by: THORACIC SURGERY (CARDIOTHORACIC VASCULAR SURGERY)

## 2020-05-18 PROCEDURE — 36415 COLL VENOUS BLD VENIPUNCTURE: CPT | Performed by: SURGERY

## 2020-05-18 PROCEDURE — 0JPT0PZ REMOVAL OF CARDIAC RHYTHM RELATED DEVICE FROM TRUNK SUBCUTANEOUS TISSUE AND FASCIA, OPEN APPROACH: ICD-10-PCS | Performed by: THORACIC SURGERY (CARDIOTHORACIC VASCULAR SURGERY)

## 2020-05-18 PROCEDURE — P9016 RBC LEUKOCYTES REDUCED: HCPCS | Performed by: SURGERY

## 2020-05-18 PROCEDURE — 85384 FIBRINOGEN ACTIVITY: CPT | Performed by: INTERNAL MEDICINE

## 2020-05-18 PROCEDURE — 25000131 ZZH RX MED GY IP 250 OP 636 PS 637: Performed by: SURGERY

## 2020-05-18 PROCEDURE — 37000008 ZZH ANESTHESIA TECHNICAL FEE, 1ST 30 MIN: Performed by: THORACIC SURGERY (CARDIOTHORACIC VASCULAR SURGERY)

## 2020-05-18 PROCEDURE — 25000128 H RX IP 250 OP 636: Performed by: NURSE ANESTHETIST, CERTIFIED REGISTERED

## 2020-05-18 PROCEDURE — 25800030 ZZH RX IP 258 OP 636: Performed by: STUDENT IN AN ORGANIZED HEALTH CARE EDUCATION/TRAINING PROGRAM

## 2020-05-18 PROCEDURE — 80048 BASIC METABOLIC PNL TOTAL CA: CPT | Performed by: SURGERY

## 2020-05-18 PROCEDURE — 40000556 ZZH STATISTIC PERIPHERAL IV START W US GUIDANCE

## 2020-05-18 PROCEDURE — 94002 VENT MGMT INPAT INIT DAY: CPT

## 2020-05-18 PROCEDURE — 41000018 ZZH PER-PERFUSION 1ST 30 MIN: Performed by: THORACIC SURGERY (CARDIOTHORACIC VASCULAR SURGERY)

## 2020-05-18 PROCEDURE — 85730 THROMBOPLASTIN TIME PARTIAL: CPT | Performed by: INTERNAL MEDICINE

## 2020-05-18 PROCEDURE — 25000125 ZZHC RX 250: Performed by: SURGERY

## 2020-05-18 PROCEDURE — 25000125 ZZHC RX 250: Performed by: STUDENT IN AN ORGANIZED HEALTH CARE EDUCATION/TRAINING PROGRAM

## 2020-05-18 PROCEDURE — 83605 ASSAY OF LACTIC ACID: CPT

## 2020-05-18 PROCEDURE — 40000275 ZZH STATISTIC RCP TIME EA 10 MIN

## 2020-05-18 PROCEDURE — 84100 ASSAY OF PHOSPHORUS: CPT | Performed by: INTERNAL MEDICINE

## 2020-05-18 PROCEDURE — 88309 TISSUE EXAM BY PATHOLOGIST: CPT | Performed by: THORACIC SURGERY (CARDIOTHORACIC VASCULAR SURGERY)

## 2020-05-18 RX ORDER — PROPOFOL 10 MG/ML
INJECTION, EMULSION INTRAVENOUS CONTINUOUS PRN
Status: DISCONTINUED | OUTPATIENT
Start: 2020-05-18 | End: 2020-05-18

## 2020-05-18 RX ORDER — PIPERACILLIN SODIUM, TAZOBACTAM SODIUM 3; .375 G/15ML; G/15ML
3.38 INJECTION, POWDER, LYOPHILIZED, FOR SOLUTION INTRAVENOUS EVERY 6 HOURS
Status: DISCONTINUED | OUTPATIENT
Start: 2020-05-18 | End: 2020-05-19

## 2020-05-18 RX ORDER — DIPHENHYDRAMINE HYDROCHLORIDE 50 MG/ML
25 INJECTION INTRAMUSCULAR; INTRAVENOUS EVERY 6 HOURS PRN
Status: DISCONTINUED | OUTPATIENT
Start: 2020-05-18 | End: 2020-05-27

## 2020-05-18 RX ORDER — PIPERACILLIN SODIUM, TAZOBACTAM SODIUM 3; .375 G/15ML; G/15ML
INJECTION, POWDER, LYOPHILIZED, FOR SOLUTION INTRAVENOUS PRN
Status: DISCONTINUED | OUTPATIENT
Start: 2020-05-18 | End: 2020-05-18

## 2020-05-18 RX ORDER — ALBUMIN, HUMAN INJ 5% 5 %
25 SOLUTION INTRAVENOUS ONCE
Status: COMPLETED | OUTPATIENT
Start: 2020-05-18 | End: 2020-05-18

## 2020-05-18 RX ORDER — POTASSIUM CHLORIDE 1.5 G/1.58G
20-40 POWDER, FOR SOLUTION ORAL
Status: DISCONTINUED | OUTPATIENT
Start: 2020-05-18 | End: 2020-05-29 | Stop reason: HOSPADM

## 2020-05-18 RX ORDER — DEXTROSE MONOHYDRATE 100 MG/ML
INJECTION, SOLUTION INTRAVENOUS CONTINUOUS PRN
Status: DISCONTINUED | OUTPATIENT
Start: 2020-05-18 | End: 2020-05-21

## 2020-05-18 RX ORDER — PROTAMINE SULFATE 10 MG/ML
INJECTION, SOLUTION INTRAVENOUS PRN
Status: DISCONTINUED | OUTPATIENT
Start: 2020-05-18 | End: 2020-05-18

## 2020-05-18 RX ORDER — HYDROMORPHONE HYDROCHLORIDE 1 MG/ML
.3-.5 INJECTION, SOLUTION INTRAMUSCULAR; INTRAVENOUS; SUBCUTANEOUS
Status: DISCONTINUED | OUTPATIENT
Start: 2020-05-18 | End: 2020-05-27

## 2020-05-18 RX ORDER — SODIUM CHLORIDE, SODIUM GLUCONATE, SODIUM ACETATE, POTASSIUM CHLORIDE AND MAGNESIUM CHLORIDE 526; 502; 368; 37; 30 MG/100ML; MG/100ML; MG/100ML; MG/100ML; MG/100ML
INJECTION, SOLUTION INTRAVENOUS CONTINUOUS PRN
Status: DISCONTINUED | OUTPATIENT
Start: 2020-05-18 | End: 2020-05-18

## 2020-05-18 RX ORDER — GABAPENTIN 100 MG/1
100 CAPSULE ORAL 3 TIMES DAILY
Status: DISCONTINUED | OUTPATIENT
Start: 2020-05-18 | End: 2020-05-21

## 2020-05-18 RX ORDER — AMOXICILLIN 250 MG
2 CAPSULE ORAL 2 TIMES DAILY
Status: DISCONTINUED | OUTPATIENT
Start: 2020-05-18 | End: 2020-05-27

## 2020-05-18 RX ORDER — PROCHLORPERAZINE MALEATE 5 MG
10 TABLET ORAL EVERY 6 HOURS PRN
Status: DISCONTINUED | OUTPATIENT
Start: 2020-05-18 | End: 2020-05-29 | Stop reason: HOSPADM

## 2020-05-18 RX ORDER — NYSTATIN 100000/ML
1000000 SUSPENSION, ORAL (FINAL DOSE FORM) ORAL 4 TIMES DAILY
Status: DISCONTINUED | OUTPATIENT
Start: 2020-05-18 | End: 2020-05-29 | Stop reason: HOSPADM

## 2020-05-18 RX ORDER — METOCLOPRAMIDE HYDROCHLORIDE 5 MG/ML
10 INJECTION INTRAMUSCULAR; INTRAVENOUS EVERY 6 HOURS PRN
Status: DISCONTINUED | OUTPATIENT
Start: 2020-05-18 | End: 2020-05-27

## 2020-05-18 RX ORDER — NICOTINE POLACRILEX 4 MG
15-30 LOZENGE BUCCAL
Status: DISCONTINUED | OUTPATIENT
Start: 2020-05-18 | End: 2020-05-18

## 2020-05-18 RX ORDER — NICOTINE POLACRILEX 4 MG
15-30 LOZENGE BUCCAL
Status: DISCONTINUED | OUTPATIENT
Start: 2020-05-18 | End: 2020-05-19

## 2020-05-18 RX ORDER — DEXTROSE MONOHYDRATE 25 G/50ML
25-50 INJECTION, SOLUTION INTRAVENOUS
Status: DISCONTINUED | OUTPATIENT
Start: 2020-05-18 | End: 2020-05-19

## 2020-05-18 RX ORDER — ACETAMINOPHEN 325 MG/1
975 TABLET ORAL EVERY 8 HOURS
Status: DISCONTINUED | OUTPATIENT
Start: 2020-05-18 | End: 2020-05-21

## 2020-05-18 RX ORDER — LIDOCAINE 40 MG/G
CREAM TOPICAL
Status: DISCONTINUED | OUTPATIENT
Start: 2020-05-18 | End: 2020-05-27

## 2020-05-18 RX ORDER — VANCOMYCIN HYDROCHLORIDE 1 G/20ML
INJECTION, POWDER, LYOPHILIZED, FOR SOLUTION INTRAVENOUS PRN
Status: DISCONTINUED | OUTPATIENT
Start: 2020-05-18 | End: 2020-05-18

## 2020-05-18 RX ORDER — OXYCODONE HYDROCHLORIDE 5 MG/1
5-10 TABLET ORAL EVERY 4 HOURS PRN
Status: DISCONTINUED | OUTPATIENT
Start: 2020-05-18 | End: 2020-05-27

## 2020-05-18 RX ORDER — ALBUMIN, HUMAN INJ 5% 5 %
SOLUTION INTRAVENOUS
Status: COMPLETED
Start: 2020-05-18 | End: 2020-05-18

## 2020-05-18 RX ORDER — SODIUM CHLORIDE, SODIUM LACTATE, POTASSIUM CHLORIDE, CALCIUM CHLORIDE 600; 310; 30; 20 MG/100ML; MG/100ML; MG/100ML; MG/100ML
INJECTION, SOLUTION INTRAVENOUS CONTINUOUS PRN
Status: DISCONTINUED | OUTPATIENT
Start: 2020-05-18 | End: 2020-05-18

## 2020-05-18 RX ORDER — ACETAMINOPHEN 325 MG/1
650 TABLET ORAL EVERY 4 HOURS PRN
Status: DISCONTINUED | OUTPATIENT
Start: 2020-05-21 | End: 2020-05-29 | Stop reason: HOSPADM

## 2020-05-18 RX ORDER — MAGNESIUM SULFATE HEPTAHYDRATE 40 MG/ML
4 INJECTION, SOLUTION INTRAVENOUS EVERY 4 HOURS PRN
Status: DISCONTINUED | OUTPATIENT
Start: 2020-05-18 | End: 2020-05-29 | Stop reason: HOSPADM

## 2020-05-18 RX ORDER — FENTANYL CITRATE 50 UG/ML
INJECTION, SOLUTION INTRAMUSCULAR; INTRAVENOUS PRN
Status: DISCONTINUED | OUTPATIENT
Start: 2020-05-18 | End: 2020-05-18

## 2020-05-18 RX ORDER — METHYLPREDNISOLONE SODIUM SUCCINATE 125 MG/2ML
125 INJECTION, POWDER, LYOPHILIZED, FOR SOLUTION INTRAMUSCULAR; INTRAVENOUS EVERY 8 HOURS
Status: DISCONTINUED | OUTPATIENT
Start: 2020-05-19 | End: 2020-05-18

## 2020-05-18 RX ORDER — POTASSIUM CHLORIDE 29.8 MG/ML
20 INJECTION INTRAVENOUS
Status: DISCONTINUED | OUTPATIENT
Start: 2020-05-18 | End: 2020-05-29 | Stop reason: HOSPADM

## 2020-05-18 RX ORDER — POTASSIUM CHLORIDE 7.45 MG/ML
10 INJECTION INTRAVENOUS
Status: DISCONTINUED | OUTPATIENT
Start: 2020-05-18 | End: 2020-05-29 | Stop reason: HOSPADM

## 2020-05-18 RX ORDER — CEFAZOLIN SODIUM 2 G/100ML
2 INJECTION, SOLUTION INTRAVENOUS
Status: DISCONTINUED | OUTPATIENT
Start: 2020-05-18 | End: 2020-05-18

## 2020-05-18 RX ORDER — DIPHENHYDRAMINE HCL 25 MG
25 CAPSULE ORAL EVERY 6 HOURS PRN
Status: DISCONTINUED | OUTPATIENT
Start: 2020-05-18 | End: 2020-05-27

## 2020-05-18 RX ORDER — ONDANSETRON 4 MG/1
4 TABLET, ORALLY DISINTEGRATING ORAL EVERY 6 HOURS PRN
Status: DISCONTINUED | OUTPATIENT
Start: 2020-05-18 | End: 2020-05-27

## 2020-05-18 RX ORDER — LIDOCAINE 40 MG/G
CREAM TOPICAL
Status: DISCONTINUED | OUTPATIENT
Start: 2020-05-18 | End: 2020-05-18 | Stop reason: HOSPADM

## 2020-05-18 RX ORDER — POTASSIUM CL/LIDO/0.9 % NACL 10MEQ/0.1L
10 INTRAVENOUS SOLUTION, PIGGYBACK (ML) INTRAVENOUS
Status: DISCONTINUED | OUTPATIENT
Start: 2020-05-18 | End: 2020-05-29 | Stop reason: HOSPADM

## 2020-05-18 RX ORDER — METOCLOPRAMIDE 5 MG/1
10 TABLET ORAL EVERY 6 HOURS PRN
Status: DISCONTINUED | OUTPATIENT
Start: 2020-05-18 | End: 2020-05-27

## 2020-05-18 RX ORDER — METHYLPREDNISOLONE SODIUM SUCCINATE 125 MG/2ML
125 INJECTION, POWDER, LYOPHILIZED, FOR SOLUTION INTRAMUSCULAR; INTRAVENOUS EVERY 8 HOURS
Status: COMPLETED | OUTPATIENT
Start: 2020-05-19 | End: 2020-05-19

## 2020-05-18 RX ORDER — PROPOFOL 10 MG/ML
5-75 INJECTION, EMULSION INTRAVENOUS CONTINUOUS
Status: DISCONTINUED | OUTPATIENT
Start: 2020-05-18 | End: 2020-05-19

## 2020-05-18 RX ORDER — PROPOFOL 10 MG/ML
INJECTION, EMULSION INTRAVENOUS PRN
Status: DISCONTINUED | OUTPATIENT
Start: 2020-05-18 | End: 2020-05-18

## 2020-05-18 RX ORDER — HEPARIN SODIUM 1000 [USP'U]/ML
INJECTION, SOLUTION INTRAVENOUS; SUBCUTANEOUS PRN
Status: DISCONTINUED | OUTPATIENT
Start: 2020-05-18 | End: 2020-05-18

## 2020-05-18 RX ORDER — NALOXONE HYDROCHLORIDE 0.4 MG/ML
.1-.4 INJECTION, SOLUTION INTRAMUSCULAR; INTRAVENOUS; SUBCUTANEOUS
Status: DISCONTINUED | OUTPATIENT
Start: 2020-05-18 | End: 2020-05-27

## 2020-05-18 RX ORDER — SULFAMETHOXAZOLE AND TRIMETHOPRIM 400; 80 MG/1; MG/1
1 TABLET ORAL DAILY
Status: DISCONTINUED | OUTPATIENT
Start: 2020-05-18 | End: 2020-05-18

## 2020-05-18 RX ORDER — AMOXICILLIN 250 MG
1 CAPSULE ORAL 2 TIMES DAILY
Status: DISCONTINUED | OUTPATIENT
Start: 2020-05-18 | End: 2020-05-29 | Stop reason: HOSPADM

## 2020-05-18 RX ORDER — ONDANSETRON 2 MG/ML
4 INJECTION INTRAMUSCULAR; INTRAVENOUS EVERY 6 HOURS PRN
Status: DISCONTINUED | OUTPATIENT
Start: 2020-05-18 | End: 2020-05-27

## 2020-05-18 RX ORDER — MAGNESIUM SULFATE HEPTAHYDRATE 40 MG/ML
2 INJECTION, SOLUTION INTRAVENOUS DAILY PRN
Status: DISCONTINUED | OUTPATIENT
Start: 2020-05-18 | End: 2020-05-29 | Stop reason: HOSPADM

## 2020-05-18 RX ORDER — DEXTROSE MONOHYDRATE 25 G/50ML
25-50 INJECTION, SOLUTION INTRAVENOUS
Status: DISCONTINUED | OUTPATIENT
Start: 2020-05-18 | End: 2020-05-18

## 2020-05-18 RX ORDER — LIDOCAINE HYDROCHLORIDE 20 MG/ML
INJECTION, SOLUTION INFILTRATION; PERINEURAL PRN
Status: DISCONTINUED | OUTPATIENT
Start: 2020-05-18 | End: 2020-05-18

## 2020-05-18 RX ORDER — POTASSIUM CHLORIDE 750 MG/1
20-40 TABLET, EXTENDED RELEASE ORAL
Status: DISCONTINUED | OUTPATIENT
Start: 2020-05-18 | End: 2020-05-29 | Stop reason: HOSPADM

## 2020-05-18 RX ORDER — MYCOPHENOLATE MOFETIL 250 MG/1
1500 CAPSULE ORAL EVERY 12 HOURS
Status: DISCONTINUED | OUTPATIENT
Start: 2020-05-18 | End: 2020-05-18 | Stop reason: HOSPADM

## 2020-05-18 RX ORDER — METHOCARBAMOL 750 MG/1
750 TABLET, FILM COATED ORAL 4 TIMES DAILY PRN
Status: DISCONTINUED | OUTPATIENT
Start: 2020-05-18 | End: 2020-05-27

## 2020-05-18 RX ORDER — CEFAZOLIN SODIUM 1 G/3ML
1 INJECTION, POWDER, FOR SOLUTION INTRAMUSCULAR; INTRAVENOUS SEE ADMIN INSTRUCTIONS
Status: DISCONTINUED | OUTPATIENT
Start: 2020-05-18 | End: 2020-05-18

## 2020-05-18 RX ORDER — SULFAMETHOXAZOLE AND TRIMETHOPRIM 200; 40 MG/5ML; MG/5ML
10 SUSPENSION ORAL DAILY
Status: DISCONTINUED | OUTPATIENT
Start: 2020-05-18 | End: 2020-05-18

## 2020-05-18 RX ADMIN — NYSTATIN 1000000 UNITS: 100000 SUSPENSION ORAL at 21:10

## 2020-05-18 RX ADMIN — PANTOPRAZOLE SODIUM 40 MG: 40 INJECTION, POWDER, FOR SOLUTION INTRAVENOUS at 20:13

## 2020-05-18 RX ADMIN — ISOPROTERENOL HYDROCHLORIDE 0.03 MCG/KG/MIN: 0.2 INJECTION, SOLUTION INTRAMUSCULAR; INTRAVENOUS at 15:45

## 2020-05-18 RX ADMIN — PROPOFOL 50 MCG/KG/MIN: 10 INJECTION, EMULSION INTRAVENOUS at 22:50

## 2020-05-18 RX ADMIN — SODIUM CHLORIDE, POTASSIUM CHLORIDE, SODIUM LACTATE AND CALCIUM CHLORIDE: 600; 310; 30; 20 INJECTION, SOLUTION INTRAVENOUS at 11:15

## 2020-05-18 RX ADMIN — VASOPRESSIN 4 UNITS/HR: 20 INJECTION INTRAVENOUS at 15:53

## 2020-05-18 RX ADMIN — PROTAMINE SULFATE 300 MG: 10 INJECTION, SOLUTION INTRAVENOUS at 16:20

## 2020-05-18 RX ADMIN — ISOPROTERENOL HYDROCHLORIDE 0.02 MCG/KG/MIN: 0.2 INJECTION, SOLUTION INTRAMUSCULAR; INTRAVENOUS at 22:29

## 2020-05-18 RX ADMIN — NOREPINEPHRINE BITARTRATE 0.06 MCG/KG/MIN: 1 INJECTION, SOLUTION, CONCENTRATE INTRAVENOUS at 14:34

## 2020-05-18 RX ADMIN — SODIUM CHLORIDE 1000 MG: 9 INJECTION, SOLUTION INTRAVENOUS at 00:53

## 2020-05-18 RX ADMIN — HUMAN INSULIN: 100 INJECTION, SOLUTION SUBCUTANEOUS at 16:38

## 2020-05-18 RX ADMIN — ACETAMINOPHEN 975 MG: 325 TABLET, FILM COATED ORAL at 11:08

## 2020-05-18 RX ADMIN — PIPERACILLIN AND TAZOBACTAM 3.38 G: 3; .375 INJECTION, POWDER, FOR SOLUTION INTRAVENOUS at 12:57

## 2020-05-18 RX ADMIN — VANCOMYCIN HYDROCHLORIDE 1000 MG: 1 INJECTION, POWDER, LYOPHILIZED, FOR SOLUTION INTRAVENOUS at 12:52

## 2020-05-18 RX ADMIN — SODIUM CHLORIDE 20 MG: 9 INJECTION, SOLUTION INTRAVENOUS at 12:15

## 2020-05-18 RX ADMIN — PROTHROMBIN, COAGULATION FACTOR VII HUMAN, COAGULATION FACTOR IX HUMAN, COAGULATION FACTOR X HUMAN, PROTEIN C, PROTEIN S HUMAN, AND WATER 1103 UNITS: KIT at 17:22

## 2020-05-18 RX ADMIN — ALBUMIN HUMAN 25 G: 0.05 INJECTION, SOLUTION INTRAVENOUS at 19:35

## 2020-05-18 RX ADMIN — SODIUM CHLORIDE, POTASSIUM CHLORIDE, SODIUM LACTATE AND CALCIUM CHLORIDE: 600; 310; 30; 20 INJECTION, SOLUTION INTRAVENOUS at 11:50

## 2020-05-18 RX ADMIN — PROPOFOL 60 MG: 10 INJECTION, EMULSION INTRAVENOUS at 11:33

## 2020-05-18 RX ADMIN — FENTANYL CITRATE 100 MCG: 50 INJECTION, SOLUTION INTRAMUSCULAR; INTRAVENOUS at 11:31

## 2020-05-18 RX ADMIN — ROCURONIUM BROMIDE 100 MG: 10 INJECTION INTRAVENOUS at 11:34

## 2020-05-18 RX ADMIN — AMINOCAPROIC ACID 1.25 G/HR: 250 INJECTION, SOLUTION INTRAVENOUS at 13:29

## 2020-05-18 RX ADMIN — MYCOPHENOLATE MOFETIL 1500 MG: 250 CAPSULE ORAL at 00:53

## 2020-05-18 RX ADMIN — ALBUMIN HUMAN 25 G: 50 SOLUTION INTRAVENOUS at 19:35

## 2020-05-18 RX ADMIN — PIPERACILLIN AND TAZOBACTAM 3.38 G: 3; .375 INJECTION, POWDER, FOR SOLUTION INTRAVENOUS at 20:17

## 2020-05-18 RX ADMIN — SODIUM CHLORIDE, SODIUM GLUCONATE, SODIUM ACETATE, POTASSIUM CHLORIDE AND MAGNESIUM CHLORIDE: 526; 502; 368; 37; 30 INJECTION, SOLUTION INTRAVENOUS at 11:18

## 2020-05-18 RX ADMIN — FENTANYL CITRATE 150 MCG: 50 INJECTION, SOLUTION INTRAMUSCULAR; INTRAVENOUS at 12:29

## 2020-05-18 RX ADMIN — AMINOCAPROIC ACID 5 G: 250 INJECTION, SOLUTION INTRAVENOUS at 11:50

## 2020-05-18 RX ADMIN — PHYTONADIONE 10 MG: 10 INJECTION, EMULSION INTRAMUSCULAR; INTRAVENOUS; SUBCUTANEOUS at 11:40

## 2020-05-18 RX ADMIN — HYDROMORPHONE HYDROCHLORIDE 0.5 MG: 1 INJECTION, SOLUTION INTRAMUSCULAR; INTRAVENOUS; SUBCUTANEOUS at 23:02

## 2020-05-18 RX ADMIN — LIDOCAINE HYDROCHLORIDE 100 MG: 20 INJECTION, SOLUTION INFILTRATION; PERINEURAL at 11:33

## 2020-05-18 RX ADMIN — MYCOPHENOLATE MOFETIL 1500 MG: 500 INJECTION, POWDER, LYOPHILIZED, FOR SOLUTION INTRAVENOUS at 22:05

## 2020-05-18 RX ADMIN — ROCURONIUM BROMIDE 50 MG: 10 INJECTION INTRAVENOUS at 13:37

## 2020-05-18 RX ADMIN — POTASSIUM CHLORIDE 20 MEQ: 29.8 INJECTION, SOLUTION INTRAVENOUS at 21:56

## 2020-05-18 RX ADMIN — HUMAN INSULIN 8 UNITS/HR: 100 INJECTION, SOLUTION SUBCUTANEOUS at 12:30

## 2020-05-18 RX ADMIN — HUMAN INSULIN: 100 INJECTION, SOLUTION SUBCUTANEOUS at 15:11

## 2020-05-18 RX ADMIN — CEFAZOLIN SODIUM 2 G: 2 INJECTION, SOLUTION INTRAVENOUS at 12:03

## 2020-05-18 RX ADMIN — INSULIN ASPART 2 UNITS: 100 INJECTION, SOLUTION INTRAVENOUS; SUBCUTANEOUS at 08:44

## 2020-05-18 RX ADMIN — SODIUM CHLORIDE 1000 MG: 0.9 INJECTION, SOLUTION INTRAVENOUS at 15:39

## 2020-05-18 RX ADMIN — AMIODARONE HYDROCHLORIDE 150 MG: 50 INJECTION, SOLUTION INTRAVENOUS at 15:50

## 2020-05-18 RX ADMIN — PROPOFOL 50 MCG/KG/MIN: 10 INJECTION, EMULSION INTRAVENOUS at 17:56

## 2020-05-18 RX ADMIN — ROCURONIUM BROMIDE 50 MG: 10 INJECTION INTRAVENOUS at 14:30

## 2020-05-18 RX ADMIN — INSULIN ASPART 1 UNITS: 100 INJECTION, SOLUTION INTRAVENOUS; SUBCUTANEOUS at 04:26

## 2020-05-18 RX ADMIN — EPINEPHRINE 0.03 MCG/KG/MIN: 1 INJECTION PARENTERAL at 11:42

## 2020-05-18 RX ADMIN — PROTHROMBIN, COAGULATION FACTOR VII HUMAN, COAGULATION FACTOR IX HUMAN, COAGULATION FACTOR X HUMAN, PROTEIN C, PROTEIN S HUMAN, AND WATER 1155 UNITS: KIT at 12:20

## 2020-05-18 RX ADMIN — ISOPROTERENOL HYDROCHLORIDE 0.02 MCG/KG/MIN: 0.2 INJECTION, SOLUTION INTRAMUSCULAR; INTRAVENOUS at 19:17

## 2020-05-18 RX ADMIN — SUGAMMADEX 200 MG: 100 INJECTION, SOLUTION INTRAVENOUS at 18:11

## 2020-05-18 RX ADMIN — HEPARIN SODIUM 35000 UNITS: 1000 INJECTION INTRAVENOUS; SUBCUTANEOUS at 14:03

## 2020-05-18 ASSESSMENT — MIFFLIN-ST. JEOR: SCORE: 1609.63

## 2020-05-18 ASSESSMENT — ACTIVITIES OF DAILY LIVING (ADL): ADLS_ACUITY_SCORE: 11

## 2020-05-18 NOTE — PROGRESS NOTES
Pre-Transplant Admission Psychosocial Assessment    Patient Name: Mariusz Sharp  : 1960  Age: 59 year old  MRN: [id number]  Date of Initial Social Work Evaluation: 20    Patient admitted from home after receiving a call about an appropriate donor heart available.  Met with Red in order to update psychosocial assessment and provide education about SW role while inpatient, and to begin discussion of expectations/requirements, caregiver needs and follow up needs post-transplant.       Presenting Information   Living Situation: Lives with 83 year-old Father in Father's home in [place].  Functional Status: Independent with ADLs and helps to care for Father. He is able to drive and manage household chores.  Cultural/Language/Spiritual Considerations: None     Support System  Primary Support Person Brother-Romeo, Son-Stepan, and friend-Ryle  Other support:  N/A  Plan for support in immediate post-hospital period: Will need local lodging most likely in [address] with 24-hour care provided by Brother, Son, and Friend     Health Care Directive  Decision Maker: Patient  Alternate Decision Maker: BrotherRomeo and Son, Stepan as alternate  Health Care Directive: Completed short form HCD with patient this morning and was able to have virtual notary through Facetime. Should be in Medical record by this afternoon     Mental Health/Coping:   History of Mental Health: No history of MH issues  History of Chemical Health: No history of Chem Dep  Current status: N/A  Coping: Good coping skills  Services Needed/Recommended: None identified     Financial   Income: Social Security  Impact of LVAD on income: No significant impact  Insurance and medication coverage: [facility] Medical Assistance  Financial concerns: Will need to contact [facility] to assess for [facility] funding for apartment  Resources needed: None identified     Discharge Plan   Patient and family discharge goal: Home when medically stable, but aware that  he will have to stay local for 30 days post-discharge with 24-hour caregiver  Barriers to discharge: Here for potential heart transplant.     Education provided by SW: Social Work role inpatient setting     Assessment and recommendations and plan:  Writer familiar with Red from LVAD and transplant assessments. Here for potential transplant. Will need apartment. Brother and son and friend will take turns being his 24-hour support. Will follow for ongoing psychosocial support and resources along with assistance for discharge planning when appropriate.

## 2020-05-18 NOTE — TELEPHONE ENCOUNTER
HEART donor was identified by Adryan Carlos and reviewed with Dr. Stone.  The organ was accepted and pt was called in for transplant.      Donor UNOS ID [id number] and Match ID [id number] confirmed with Dr. Stone.      Donor and recipient blood type reviewed and found to be IDENTICAL.        Recipient with HLA antibodies: NO  Crossmatch required   Prospective:   Virtual: Negative  Crossmatch reviewed with Dr. Louis, immunology staff on call and deemed negative based on organ specific protocol.     Donor specific antibodies absent, notified Dr. Stone.    Pt was contacted AT HOME.    Verified pt has not had any blood transfusions since their last PRA sample on 2/17/2020.     Pt Instructed to remain NPO for pre-op prep.  Instructed to bring medications, oxygen, or equipement.    Pt instructed to come to the [facility]  ER.     Pt on Coumadin: YES   Intervention: TBD    HEART RECIPIENT: Renal function reviewed, pt IS pre-selected for CNI delaying protocol, Dr. Stone notified. @05/15/2020(Cr:1.84)@0559]      ABO/CMV/EBV status note:   UNOS donor ID [id number]  Donor blood type is A: Verified by donor records   Recipient blood type is A: verified by blood bank [facility].   Donor CMV status is positive. Verified by donor records.   Recipient CMV status is unable to be located in [facility]. Verified in [facility] lab results.   Donor EBV status is negative. Verified by donor records.   Recipient EBV status is unable to be located in [facility]. Verified in [facility] lab results.   Recipient HSV status is positive. verified in [facility] lab results.   Donor Toxoplasma status is negative. Verified by donor records.  Recipient Toxoplasma status is unable to be located in [facility]. Verified in [facility] lab results.

## 2020-05-18 NOTE — ANESTHESIA PROCEDURE NOTES
Perioperative SELVIN Report  Anesthesia Information  SELVIN probe placed and report generated by:   Chalo Harrell MD in the presence of a teaching physician :  Elder Waldrop MD    I personally reviewed the images and the fellow/resident interpretation.  I agree with the fellow/resident interpretation as amended by myself. Elder Waldrop MD  Images for this study have been archived.   Surgeon:  Elder Willis MD      Preanesthesia Checklist:  Patient identified, IV assessed, risks and benefits discussed, monitors and equipment assessed, procedure being performed at surgeon's request and anesthesia consent obtained.  General Procedure Information  Modalities:  2D, 3D, CW Doppler, PW Doppler and Color flow mapping  Diagnostic indications for SELVIN:                             Heart Transplant.    Echocardiographic and Doppler Measurements  Right Ventricle:  Cavity size dilated.   Thrombus not present.    Global function moderately impaired.     Left Ventricle:  Cavity size dilated.   Thrombus not present.   Global Function severely impaired.       Ventricular Regional Function:  1- Basal Anteroseptal:  hypokinetic  2- Basal Anterior:  hypokinetic  3- Basal Anterolateral:  hypokinetic  4- Basal Inferolateral:  hypokinetic  5- Basal Inferior:  hypokinetic  6- Basal Inferoseptal:  hypokinetic  7- Mid Anteroseptal:  hypokinetic  8- Mid Anterior:  hypokinetic  9- Mid Anterolateral:  hypokinetic  10- Mid Inferolateral:  hypokinetic  11- Mid Inferior:  hypokinetic  12- Mid Inferoseptal:  hypokinetic  13- Apical Anterior:  hypokinetic  14- Apical Lateral:  hypokinetic  15- Apical Inferior:  hypokinetic  16- Apical Septal:  hypokinetic  17- [place]:  hypokinetic    Valves  Aortic Valve: Annulus normal.  Stenosis not present.  Regurgitation absent.  Leaflets normal.  Leaflet motions normal.    Mitral Valve: Annulus normal.  Regurgitation +1.  Leaflet motions normal.    Tricuspid Valve: Annulus normal.  Stenosis not  present.  Regurgitation +2.  Leaflets normal.  Leaflet motions normal.    Pulmonic Valve: Annulus normal.  Stenosis not present.  Regurgitation absent.      Aorta: Ascending Aorta: Size normal.  Dissection not present.  Plaque thickness less than 3 mm.  Mobile plaque not present.    Aortic Arch: Size normal.   Dissection not present.   Plaque thickness less than 3 mm.   Mobile plaque not present.    Descending Aorta: Size normal.   Dissection not present.   Plaque thickness less than 3 mm.   Mobile plaque not present.        Right Atrium:  Size dilated.   Spontaneous echo contrast not present.   Thrombus not present.   Tumor not present.   Device not present.     Left Atrium: Size dilated.  Spontaneous echo contrast not present.  Thrombus not present.  Tumor not present.  Device not present.    Left atrial appendage normal.     Atrial Septum: Intra-atrial septal morphology normal.     Ventricular Septum: Intra-ventricular septum morphology normal.       Other Findings:   Pericardium:  normal.  Pleural Effusion:  none. .  .  .  .  Post Intervention Findings  Procedure(s) performed:  Heart or Lung Transplant. .   Regional wall motion:   Surgeon(s) notified of all postintervention findings:  Yes  .  .  .   .  .  .  .  Heart Transplant:  Transplant heart function normal.  .  .    S/p orthotopic heart transplant, LVAD explant, ICD explant  Patient was on the following medications:   0.03 mcg/kg/min epinephrine,  0.05 mcg/kg/min norepinephrine,  4 units/hr vasopressin, 0.02mcg/kg/min isoproterenol  LV is normal in size, with normal systolic function   LVEF: 55 %  by visual estimation         RV is slightly dilated in size with normal systolic function      AV: trileaflet leaflet         no AS                     MV: Normal mitral leaflet morphology       TV: Trace TR.     Vmax: 68.8m/s  P mmHg     PV: No PI       No aortic dissection, no pleural effusions.         Echocardiogram Comments

## 2020-05-18 NOTE — ANESTHESIA PREPROCEDURE EVALUATION
Anesthesia Pre-Procedure Evaluation    Patient: Mariusz Sharp   MRN:     [id number] Gender:   male   Age:    59 year old :      1960        Preoperative Diagnosis: Ischemic dilated cardiomyopathy (H) [I25.5, I42.0]   Procedure(s):  REDO MEDIAN STERNOTOMY, TRANSPLANT, HEART, RECIPIENT, WITH LEFT VENTRICULAR ASSIST DEVICE REMOVAL, ICD REMOVAL     LABS:  CBC:   Lab Results   Component Value Date    WBC 7.5 2020    WBC 6.6 05/15/2020    HGB 11.1 (L) 2020    HGB 12.4 (L) 05/15/2020    HCT 36.7 (L) 2020    HCT 41.1 05/15/2020     2020     05/15/2020     BMP:   Lab Results   Component Value Date     2020     05/15/2020    POTASSIUM 4.2 2020    POTASSIUM 4.0 05/15/2020    CHLORIDE 110 (H) 2020    CHLORIDE 108 05/15/2020    CO2 22 2020    CO2 21 05/15/2020    BUN 45 (H) 2020    BUN 33 (H) 05/15/2020    CR 2.10 (H) 2020    CR 1.84 (H) 05/15/2020     (H) 2020     (H) 05/15/2020     COAGS:   Lab Results   Component Value Date    PTT 38 (H) 2020    INR 2.19 (H) 2020    FIBR 527 (H) 2019     POC:   Lab Results   Component Value Date     (H) 2020     OTHER:   Lab Results   Component Value Date    PH Incorrect specimen type 2019    LACT 1.7 2019    A1C 8.0 (H) 2020    ARLENE 9.1 2020    PHOS 3.4 2020    MAG 1.8 2020    ALBUMIN 3.4 2020    PROTTOTAL 6.7 (L) 2020    ALT 33 2020    AST 30 2020    ALKPHOS 51 2020    BILITOTAL 0.7 2020    AMYLASE 65 2020    TSH 3.94 2019    T4 1.05 2018    CRP 11.0 (H) 2018        Preop Vitals    BP Readings from Last 3 Encounters:   20 104/85   05/15/20 94/73   19 (!) 70/0    Pulse Readings from Last 3 Encounters:   20 63   19 60   19 80      Resp Readings from Last 3 Encounters:   20 18   05/15/20 16   19 20    SpO2 Readings  "from Last 3 Encounters:   05/18/20 98%   05/15/20 98%   12/04/19 95%      Temp Readings from Last 1 Encounters:   05/18/20 36.7  C (98.1  F)    Ht Readings from Last 1 Encounters:   05/18/20 1.626 m (5' 4\")      Wt Readings from Last 1 Encounters:   05/18/20 87.4 kg (192 lb 9.6 oz)    Estimated body mass index is 33.06 kg/m  as calculated from the following:    Height as of this encounter: 1.626 m (5' 4\").    Weight as of this encounter: 87.4 kg (192 lb 9.6 oz).     LDA:  Peripheral IV 05/18/20 Left Lower forearm (Active)   Site Assessment WDL 05/18/20 0027   Line Status Saline locked 05/18/20 0027   Infiltration Scale 0 05/18/20 0027   Infiltration Site Treatment Method  None 05/18/20 0027   Extravasation? No 05/18/20 0027   Dressing Intervention New dressing  05/18/20 0027   Number of days: 0        Past Medical History:   Diagnosis Date     Acute kidney injury (H)      CKD (chronic kidney disease)      Coronary artery disease      Diabetes mellitus (H)      HTN (hypertension)      Hyperlipidemia      Ischemic cardiomyopathy      LV (left ventricular) mural thrombus      Paroxysmal atrial flutter (H)      RVF (right ventricular failure) (H)      Systolic heart failure (H)      Ventricular tachycardia (H)       Past Surgical History:   Procedure Laterality Date     COLONOSCOPY N/A 12/7/2018    Procedure: COLONOSCOPY;  Surgeon: Krish Mercado MD;  Location: [facility] OR     CV RIGHT HEART CATH N/A 2/19/2019    Procedure: RHC;  Surgeon: Brian Long MD;  Location: [facility] HEART CARDIAC CATH LAB     CV RIGHT HEART CATH N/A 7/5/2019    Procedure: CV RIGHT HEART CATH;  Surgeon: Khris Mcclelland MD;  Location: [facility] HEART CARDIAC CATH LAB     HC PRQ TRLUML CORONARY ANGIOPLASTY ADDL BRANCH      PCI and ALYSSA to ostial LAD on 6/27/18     IMPLANT AUTOMATIC IMPLANTABLE CARDIOVERTER DEFIBRILLATOR       INSERT VENTRICULAR ASSIST DEVICE LEFT (HEARTMATE II) N/A 12/31/2018    Procedure: Median Sternotomy, On Cardiopulmonary Bypass " Pump, Insertion of Left Ventricular Assist Device (Heartmate III);  Surgeon: Kamaljit Baker MD;  Location: [facility] OR      No Known Allergies     Anesthesia Evaluation     . Pt has had prior anesthetic. Type: General           ROS/MED HX    ENT/Pulmonary: Comment:  12/31/18; 0726; Mask Ventilation: Easy with oral airway; Ease of Intubation: Easy; Airway Size: 8;  Cuffed;  Oral;  Blade Type: Bon;  Blade Size: 3;  Place by: Eustice        (+)FERMÍN risk factors , . .    Neurologic:       Cardiovascular:     (+) hypertension--CAD (carvedilol, hydralazine, digoxin), -past MI,-stent,6/27/18  Drug Eluting Stent .. Taking blood thinners : Instructions Given to patient: ALEENA English. CHF (ICM with EF 20-25% s/p HeartMate 3 LVAD implant 12/31/18) . . :. .       METS/Exercise Tolerance:     Hematologic:     (+) Anemia, -      Musculoskeletal:         GI/Hepatic:         Renal/Genitourinary: Comment: Recently admitted 5/12-5/15 for nephrolithiasis c/b hydronephrosis and ANDREW.     (+) chronic renal disease, type: CRI and ARF,       Endo:     (+) type II DM Using insulin .      Psychiatric:         Infectious Disease:         Malignancy:         Other:                     JABHINAVG FV AN PHYSICAL EXAM    Assessment:   ASA SCORE: 4    H&P: History and physical reviewed and following examination; no interval change.   Smoking Status:  Non-Smoker/Unknown   NPO Status: NPO Appropriate     Plan:   Anes. Type:  General   Pre-Medication: None   Induction:  IV (Standard)   Airway: ETT; Oral   Access/Monitoring: PIV; 2nd PIV; A-Line; FloTrac; MAC-Line; PAC   Maintenance: Balanced     Blood products: Blood in Room     Drips/Meds: Norepi; Epinephrine     Advanced Monitoring: BIS; SELVIN Adult; NIRS (cerebral)            ADULT SELVIN Checklist:               Absolute Contra-Indications: NONE               Relative Contra-Indications:  NONE               Final Plan: Proceed with SELVIN     Postop Plan:   Postop Pain: Opioids  Postop Sedation/Airway: SECURED  AIRWAY likely  Disposition: ICU     PONV Management:  NO PONV Prophylaxis Required                   Som Manzano MD

## 2020-05-18 NOTE — H&P
[facility], [place]    Transplant Surgery  History and Physical    Mariusz Sharp  : 1960  MRN # [id number]    ADMIT DATE: 2020    PCP: He, Like    CHIEF COMPLAINT: Potential heart transplant     HPI: Mariusz Sharp is a 59 year old male history of CAD (s/p STEMI with ALYSSA to LAD 18), LV thrombus, CKD Stage III, PAF, DM Type II, and ICM with EF 20-25% s/p HeartMate 3 LVAD implant 18 who presents for potential heart transplant. He was recently admitted -5/15 for nephrolithiasis c/b hydronephrosis and acute kidney injury.     He notes that he was feeling well at home and in his usual state of health. Denies fevers, chills, cough, chest pain or SOB. No change to bowel or bladder habits but does note that he has hematuria, which is normal for him when he 'walks a lot' as he did yesterday. Denies changes to medical or surgical history since he was lasted admitted.     ROS:   CONSTITUTIONAL: Denies fever, chills, fatigue, or weight fluctuations  HEAD: Denies headache.  EENT: Denies vision changes, hearing changes, dysphagia, or sore throat.   NECK: Denies lymphadenopathy.   CV:Denies chest pain, palpitations, or shortness of breath.   PULMONARY:Denies shortness of breath, cough.   GI:Denies nausea, vomiting, diarrhea, and abdominal pain. Bowel movements are regular.   :Denies urinary alterations, dysuria, urinary frequency, and abnormal drainage. +hematuria, see HPI  EXT:Denies lower extremity edema.   SKIN:Denies abnormal rashes or lesions.   MUSCULOSKELETAL:Denies upper or lower extremity weakness and pain.   NEUROLOGIC:Denies lightheadedness, dizziness, seizures, or upper or lower extremity paresthesia.   HEMATOLOGICAL: Denies h/o of blood clots. +easy bruising/bleeding d/t warfarin   PSYCHIATRIC:Denies any mood alterations.     PMH:  Past Medical History:   Diagnosis Date     Acute kidney injury (H)      CKD (chronic kidney disease)      Coronary artery  disease      Diabetes mellitus (H)      HTN (hypertension)      Hyperlipidemia      Ischemic cardiomyopathy      LV (left ventricular) mural thrombus      Paroxysmal atrial flutter (H)      RVF (right ventricular failure) (H)      Systolic heart failure (H)      Ventricular tachycardia (H)        PSH:  Past Surgical History:   Procedure Laterality Date     COLONOSCOPY N/A 12/7/2018    Procedure: COLONOSCOPY;  Surgeon: Krish Mercado MD;  Location: [facility] OR     CV RIGHT HEART CATH N/A 2/19/2019    Procedure: RHC;  Surgeon: Brian Long MD;  Location: [facility] HEART CARDIAC CATH LAB     CV RIGHT HEART CATH N/A 7/5/2019    Procedure: CV RIGHT HEART CATH;  Surgeon: Khris Mcclelland MD;  Location:  HEART CARDIAC CATH LAB     HC PRQ TRLUML CORONARY ANGIOPLASTY ADDL BRANCH      PCI and ALYSSA to ostial LAD on 6/27/18     IMPLANT AUTOMATIC IMPLANTABLE CARDIOVERTER DEFIBRILLATOR       INSERT VENTRICULAR ASSIST DEVICE LEFT (HEARTMATE II) N/A 12/31/2018    Procedure: Median Sternotomy, On Cardiopulmonary Bypass Pump, Insertion of Left Ventricular Assist Device (Heartmate III);  Surgeon: Kamaljit Baker MD;  Location: [facility] OR       MEDICATIONS:  Prior to Admission Medications   Prescriptions Last Dose Informant Patient Reported? Taking?   ACCU-CHEK [place] PLUS test strip   Yes No   BD SILVANA U/F 32G X 4 MM insulin pen needle   Yes No   aspirin (ASA) 81 MG chewable tablet  Self No No   Sig: Take 1 tablet (81 mg) by mouth daily   carvedilol (COREG) 12.5 MG tablet   Yes No   Sig: Take 2 tablets (25 mg) by mouth 2 times daily (with meals) TAKE 2 TABLETS (25mg) BY MOUTH EVERY MORNING AND 2 TABLETS (25mg) EVERY AFTERNOON   digoxin (LANOXIN) 125 MCG tablet   Yes No   Sig: Take 1 tablet (125 mcg) by mouth four times a week   famotidine (PEPCID) 20 MG tablet   No No   Sig: Take 1 tablet (20 mg) by mouth 2 times daily   hydrALAZINE (APRESOLINE) 50 MG tablet   Yes No   Sig: Take 1 tablet (50 mg) by mouth 3 times daily   insulin aspart  (NOVOLOG PEN) 100 UNIT/ML pen   No No   Sig: Inject 1-10 Units Subcutaneous 3 times daily (before meals) Correction Scale - HIGH INSULIN RESISTANCE DOSING     -164 =1 units.   -189 =2.   -214 =3.   -239 =4.   -264 =5.   -289 =6.   -314 =7.   -339 =8.   -364 =9.  BG greater than or equal to 365 give 10 units  To be given with meal insulin, based on pre-meal BG   insulin aspart (NOVOLOG PEN) 100 UNIT/ML pen   No No   Sig: Inject 1-7 Units Subcutaneous At Bedtime HIGH INSULIN RESISTANCE DOSING    Bedtime  For  - 224 give 1 units.   For  - 249 give 2 units.   For  - 274 give 3 units.   For  - 299 give 4 units.   For  - 324 give 5 units.   For  - 349 give 6 units.   For BG greater than or equal to 350 give 7 units.   Patient not taking: Reported on 2019   insulin aspart (NOVOLOG PEN) 100 UNIT/ML pen   No No   Sig: Prandial Dosin units per 7 grams of carbohydrate each Meal or Snack.  Only chart total amount of units given.  Do not give if pre-prandial glucose is less than 60 mg/dL. If given at mealtime, administer within 30 minutes of start of meal.   insulin glargine (LANTUS SOLOSTAR PEN) 100 UNIT/ML pen   No No   Sig: Inject 24 Units Subcutaneous At Bedtime   nitroGLYcerin (NITROSTAT) 0.4 MG sublingual tablet   No No   Sig: Place 1 tablet (0.4 mg) under the tongue every 5 minutes as needed for chest pain For chest pain place 1 tablet under the tongue every 5 minutes for 3 doses. If symptoms persist 5 minutes after 1st dose call 911.   oxyCODONE (ROXICODONE) 5 MG tablet   Yes No   Sig: Take 5 mg by mouth   rosuvastatin (CRESTOR) 20 MG tablet   No No   Sig: Take 1 tablet (20 mg) by mouth At Bedtime   tamsulosin (FLOMAX) 0.4 MG capsule   Yes No   warfarin ANTICOAGULANT (COUMADIN) 2.5 MG tablet   Yes No   Sig: Take 2 tablets daily or as directed by coumadin clinic.      Facility-Administered Medications: None     "    ALLERGIES:   No Known Allergies-confirmed with patient     FAMILY HISTORY:  No family history on file. Non contributory     SOCIAL HISTORY:  Social History     Socioeconomic History     Marital status: Single     Spouse name: Not on file     Number of children: Not on file     Years of education: Not on file     Highest education level: Not on file   Occupational History     Not on file   Social Needs     Financial resource strain: Not on file     Food insecurity     Worry: Not on file     Inability: Not on file     Transportation needs     Medical: Not on file     Non-medical: Not on file   Tobacco Use     Smoking status: Never Smoker     Smokeless tobacco: Never Used   Substance and Sexual Activity     Alcohol use: No     Frequency: Never     Drug use: No     Sexual activity: Not on file   Lifestyle     Physical activity     Days per week: Not on file     Minutes per session: Not on file     Stress: Not on file   Relationships     Social connections     Talks on phone: Not on file     Gets together: Not on file     Attends Hinduism service: Not on file     Active member of club or organization: Not on file     Attends meetings of clubs or organizations: Not on file     Relationship status: Not on file     Intimate partner violence     Fear of current or ex partner: Not on file     Emotionally abused: Not on file     Physically abused: Not on file     Forced sexual activity: Not on file   Other Topics Concern     Not on file   Social History Narrative     Not on file   No tobacco, ETOH or illicit drug use     PHYSICAL EXAM:  Blood pressure (!) 109/90, temperature 97.9  F (36.6  C), height 1.626 m (5' 4\"), weight 87.4 kg (192 lb 9.6 oz).  GENERAL: Appears alert and oriented times three.   HEENT: NCAT, EOMI   NECK: Supple and without lymphadenopathy.  CV: LVAD  RESPIRATORY: Respirations regular, even, and unlabored.   GI: Soft and non distended. No tenderness, rebound, guarding. Driveline site on rt c/d/i. "   EXTREMITIES: No significant peripheral edema.  NEUROLOGIC: Alert. CN II-XII grossly intact. No focal deficits.   MUSCULOSKELETAL: No joint swelling or tenderness.   SKIN: No jaundice. No rashes or lesions.     LABS:  CBC:  Recent Labs   Lab Test 05/15/20  0559   WBC 6.6   RBC 4.91   HGB 12.4*   HCT 41.1   MCV 84   MCH 25.3*   MCHC 30.2*   RDW 15.5*          CMP:  Recent Labs   Lab Test 05/15/20  0559  05/11/20      < > 137   POTASSIUM 4.0   < > 5.1   CHLORIDE 108   < > 104   ARLENE 9.2   < > 8.7   CO2 21   < > 23   BUN 33*   < > 61   CR 1.84*   < > 2.7   *   < > 172*   AST  --   --  26   ALT  --   --  25   BILITOTAL  --   --  0.6   ALBUMIN  --   --  4.1   PROTTOTAL  --   --  6   ALKPHOS  --   --  55    < > = values in this interval not displayed.       IMAGING:  CXR, preliminary read   1.  Stable left chest wall implantable cardiac defibrillator and LVAD.  2.  No acute airspace opacities.    ASSESSMENT & PLAN:  Mariusz Sharp is a 59 year old male history of CAD (s/p STEMI with ALYSSA to LAD 6/27/18), LV thrombus, CKD Stage III, PAF, DM Type II, and ICM with EF 20-25% s/p HeartMate 3 LVAD implant 12/31/18 who presents for potential heart transplant. He is feeling well.     -Admit to inpatient  -Covid-19 screen  -NPO for planned OR tomorrow @ 8am  -Preop workup including labs and imaging   -Per coordinator, no HLA needed (will be retrospective). Will get Basiliximab, also confirmed by cards fellow     Discussed with Dr. Ryan, cardiothoracic surgery fellow.     Dorothy Leal MD  General Surgery, PGY1  [phone]

## 2020-05-18 NOTE — PROGRESS NOTES
VAD Coordinator in OR throughout duration of Heart Transplant surgery. VAD parameters were monitored in collaboration with anesthesia.        VAD parameters at START of surgery:  o Speed: 5300 rpm  o PI (or flow waveform peak/trough for HW): 4.4  o Flow: 4.1 lpm  o Power: 3.9 gamez    VAD parameters at END of surgery:  o Speed: 5300 rpm  o PI (or flow waveform peak/trough for HW): 3.8  o Flow: 4.0 lpm  o Power: 3.8 gamez    Speed adjustments made during OR: none    Flow range: 2.0-5.0 lpm    PI (or flow waveform peak/trough for HW) range: 1.8-8.0    Factors noted to cause a significant variation in VAD parameters: Blood pressure    Other significant events: VAD was successfully weaned and stopped at 1430. Patient no longer has a VAD. Patient's external equipment (controller, backup controller, 4 batteries, 4 battery clips) placed in patient belonging bag to be sent back to patient's room, as requested.      Abraham Butterfield

## 2020-05-18 NOTE — H&P
CV ICU ADMISSION NOTE  May 18, 2020      PRIMARY TEAM: CVTS  PRIMARY PHYSICIAN: Lazaro  REASON FOR CRITICAL CARE ADMISSION: Redo sternotomy, Heart Transplant, removal LVAD, ICD implantation    ADMITTING PHYSICIAN: Mariely   Date of Service (when I saw the patient): 05/18/2020     ASSESSMENT: 59 year old male with significant PMHx of CAD, HTN, h/o MI s/p stent 2019 with ALYSSA with ICM with LVAD HeartMate III implant, ( EF 20-25%) taking blood thinners, chronic anemia, CKD, D2M on insulin s/p heart transplant, LVAD removal and ICD placement on 5/18/2020 with Dr. Willis    PLAN:     Neurological:  #Sedation   - Monitor neurological status. Delirium preventions and precautions.   - Pain: Tylenol, PRN hydromorphone, Neurontin 100 TID  - HELD PTA pain regimen: PTA oxycodone 20 mg TID, gabapentin 600 mg TID, topical diclofenac PRN, flexeril PRN            - Sedation plan: propofol infusion    Pulmonary:   # COVID negative on 5/18  - VENT: Ventilation Mode: CMV  - HOB elevation   - Supplemental oxygen to keep saturation above 92 %.      Cardiovascular:    # HTN  # CAD   #syncope with coughing  #RV dysfunction  - Monitor hemodynamic status.   - Epinephrine infusion, Isoproterenol infusion  - HELD  PTA meds Carvedilol, hydralazine, digoxin     Gastroenterology/Nutrition:  OG tube to suction     Fluids/Electrolytes:   Electrolyte protocol in place     Renal:  #CKD  - monitor UOP, follow electrolyte lab        Endocrine:  #Stress/Steroid induced hyperglycemia  - Insulin infusion     ID:  # Immunosuppression per cardiology  # Infectious prophylaxis     Heme:     #Chronic Anemia     Musculoskeletal:  # No issues      Skin:  # No issues     General Cares/Prophylaxis:    DVT Prophylaxis: Pneumatic Compression Devices  GI Prophylaxis: PPI  Restraints: Restraints for medical healing needed: NO     Lines/ tubes/ drains:  - ETT, PIV, Arterial line, PAC, CVC-line     Disposition:  - CV ICU.        Exam:  BP (!) 106/93 (BP Location:  "Left arm)   Temp 98.2  F (36.8  C) (Pulmonary Artery)   Resp 14   Ht 1.626 m (5' 4\")   Wt 87.4 kg (192 lb 9.6 oz)   SpO2 99%   BMI 33.06 kg/m    Lying on bed, intubated and sedated  Equal chest rise, breath sounds equal  Regular heart rhythm, tachycardia present  Chest tubes with small volume bloody output  Incision dressing clean and dry  Abdomen soft, non-distended  Extremities normal, distal perfusion in tact  Skin warm and dry, no rashes  Pupils equal and round, sclera anicteric    Patient seen with CVTS fellow and staff    Davis Washington MD  Surgery   "

## 2020-05-18 NOTE — BRIEF OP NOTE
[facility], [place]    Brief Operative Note    Pre-operative diagnosis: Ischemic dilated cardiomyopathy (H) [I25.5, I42.0]  Post-operative diagnosis Same as pre-operative diagnosis    Procedure: Procedure(s):  REDO MEDIAN STERNOTOMY, LYSIS OF ADHESIONS, ON CARDIOPULMONARY BYPASS PUMP, HEART TRANSPLANT RECIPIENT, REMOVAL OF LEFT VENTRICULAR ASSIST DEVICE, REMOVAL OF AUTOMATED IMPLANTABLE CARDIOVERTER DEFIBRILLATOR  Surgeon: Surgeon(s) and Role:     * Elder Willis MD - Primary     * Kamaljit Baker MD - Assisting     * Rebecca Simms MD - Resident - Observing     * Lavon Ryan MD - Fellow - Assisting     * Brenda Guevara MD - Fellow - Assisting     * Lavon Ryan MD  Anesthesia: General   Estimated blood loss: 1000cc    Drains:  mediastinal x 3; left pleural, right pleural  Specimens:   ID Type Source Tests Collected by Time Destination   1 : Outflow Graft Other (specify in comments) Other ANAEROBIC BACTERIAL CULTURE, FUNGUS CULTURE, GRAM STAIN, MISCELLANEOUS CULTURE AEROBIC BACTERIAL Elder Willis MD 5/18/2020 12:43 PM    A : Native Heart  Tissue Heart SURGICAL PATHOLOGY EXAM Elder Willis MD 5/18/2020  2:58 PM    B : implantable cardioverter defibrillator (ICD) Other (specify in comments) Other SURGICAL PATHOLOGY EXAM Elder Willis MD 5/18/2020  4:58 PM      Findings:   cardiomyopathy, heart failure.  Complications: None.  Implants:   Implant Name Type Inv. Item Serial No.  Lot No. LRB No. Used Action   [employer]* [id number] INGEVITY MRI [id number] Leads  [id number] [employer]   1 Explanted   [employer]* [id number] DYNAGEN [id number] ICD  [id number] [employer]   1 Explanted   GUIDANT CHIDI 0180 ENDOTAK RELIANCE SG [id number] Leads  [id number] [employer]-   1 Explanted   HEARTMATE 3 OUTFLOW GRAFT CLIP LVAD   THORATEC [employer] CHIDI  [id number] Left 1 Explanted   THORATEC HEARTMATE 3 APICAL CUFF LVAD   [employer] LAB[employer] CHIDI  [id number] Left  1 Explanted   THORATEC HEARTMATE 3 SEALED OUTFLOW GRAFT WITH BEND RELIEF  LVAD   [employer] CHIDI  [id number] Left 1 Explanted   THORATEC HEARTMATE 3 VENTRICULAR ASSIST DEVICE-BLOOD PUMP LVAD  [id number] [employer] CHIDI   Left 1 Explanted   GRAFT GORTEX [id number] STRETCH [id number] Graft GRAFT GORTEX [id number] STRETCH [id number] [employer]   N/A 1 Explanted

## 2020-05-18 NOTE — ANESTHESIA PROCEDURE NOTES
SELVIN Probe Insertion Note:  Staff -   Anesthesiologist:  Elder Waldrop MD  Resident/Fellow: Chalo Harrell MD    Performed By: fellow        Probe Number: 6  Probe Status PRE Insertion: NO obvious damage  Probe type:  Adult 3D    Bite block used:   Yes  Insertion Technique: Easy, no oropharyngeal manipulation  Insertion complications: None obvious    Billing Report:SELVIN report by Anesthesiologist (See Separate Report note)    Probe Status POST Removal: NO obvious damage

## 2020-05-18 NOTE — ANESTHESIA CARE TRANSFER NOTE
Patient: Mariusz Sharp    Procedure(s):  REDO MEDIAN STERNOTOMY, LYSIS OF ADHESIONS, ON CARDIOPULMONARY BYPASS PUMP, HEART TRANSPLANT RECIPIENT, REMOVAL OF LEFT VENTRICULAR ASSIST DEVICE, REMOVAL OF AUTOMATED IMPLANTABLE CARDIOVERTER DEFIBRILLATOR    Diagnosis: Ischemic dilated cardiomyopathy (H) [I25.5, I42.0]  Diagnosis Additional Information: No value filed.    Anesthesia Type:   General     Note:  Airway :ETT  Patient transferred to:ICU  Comments: To ICU on monitor, 100% FiO2 via ETT and Ambu. VSS throughout transport. RT at bedside, patient placed on ventilator. Report given to RN, all questions answered. Refer to flowsheet for gtts, VSS.    Martine Arauz CRNA  5/18/2020ICU Handoff: Call for PAUSE to initiate/utilize ICU HANDOFF, Identified Patient, Identified Responsible Provider, Reviewed the Pertinent Medical History, Discussed Surgical Course, Reviewed Intra-OP Anesthesia Management and Issues during Anesthesia, Set Expectations for Post Procedure Period and Allowed Opportunity for Questions and Acknowledgement of Understanding      Vitals: (Last set prior to Anesthesia Care Transfer)    CRNA VITALS  5/18/2020 1732 - 5/18/2020 1811      5/18/2020             Pulse:  113    ART BP:  130/74    ART Mean:  92    Ht Rate:  113            Electronically Signed By: CARMEN Banks CRNA  May 18, 2020  6:11 PM

## 2020-05-18 NOTE — PROGRESS NOTES
Date: 5/18/2020  Time: 11:00 AM  Status: Unposted    Location: U OR  Room:  OR   Service: Transplant    Patient class: Outpatient  Case classification:           Patient has clinic visit within 24-48 hours of Discharge so no post DC follow up call is needed

## 2020-05-18 NOTE — ANESTHESIA PROCEDURE NOTES
Arterial Line Procedure Note  Staff -   Anesthesiologist:  Elder Waldrop MD  Resident/Fellow: Chalo Harrell MD    Performed By: fellow  Procedure performed by resident/CRNA in presence of a teaching physician.          Location: In OR Before Induction  Procedure Start/Stop Times:     patient identified, IV checked, site marked, risks and benefits discussed, informed consent, monitors and equipment checked, pre-op evaluation and at physician/surgeon's request      Correct Patient: Yes      Correct Position: Yes      Correct Site: Yes      Correct Procedure: Yes      Correct Laterality:  Yes    Site Marked:  Yes  Line Placement:     Procedure:  Arterial Line    Insertion Site:  Radial    Insertion laterality:  Left    Skin Prep: Chloraprep      Patient Prep: patient draped, mask, sterile gloves, hat and hand hygiene      Local skin infiltration:  1% lidocaine    amount (mL):  1    Ultrasound Guided?: Yes      Artery evaluated via ultrasound confirming patency.   Using realtime imaging, the artery was punctured and the needle was observed entering the artery.      Catheter size:  20 gauge, 12 cm    Cath secured with: suture      Dressing:  Tegaderm    Complications:  None obvious    Arterial waveform: Yes      IBP within 10% of NIBP: Yes

## 2020-05-18 NOTE — PLAN OF CARE
Transfer  Transferred to: 3C  Via:bed  Reason for transfer: Heart transplant.  Family: Aware of transfer  Belongings: Sent with pt  Chart: Sent with pt  Medications: Meds from bin sent with pt  Report called to: PHOEBE Kelly

## 2020-05-18 NOTE — PROGRESS NOTES
PATHOLOGY HLA CROSSMATCH CONSULTATION: DONOR/RECIPIENT  VIRTUAL CROSSMATCH - Heart  Consultation Date: May 17, 2020  Consultation Requested by: Dr. Josue Bartlett    Regarding: Compatibility of  donor organ UNOS #[id number] [id number] from OPO: [facility] with Mariusz Sharp    Findings: Regarding a virtual crossmatch between Mariusz Sharp and  donor listed above (match ID [id number]):  The most recent (20) and four (4) additional patient serum/sera  were analyzed.  The patient has no antibodies listed with HLA specificity against the donor organ.      Record Review Indicates: I personally reviewed the most recent serum, the historic peak sera, and all other sera with solid-phase HLA Single Antigen test results:  The patient has no HLA antibodies against the donor organ.     The results of this virtual XM are:   -most recent serum: compatible   -peak #1: compatible  -peak #2: compatible    Disclaimer: Clinical judgement must take into account other factors, such as non-HLA antibodies not detected in the assay. The VXM gives probabilities only.  The probability does not account for the potential for auto-antibodies that may be present in the patient's serum.  These autoantibodies may render the physical crossmatch falsely positive, and would be detected by an autologous crossmatch.  When possible, confirm findings with prospective allogeneic and autologous flow crossmatches before going to transplant as clinically indicated.     Abraham Louis, PhD    Abraham Louis, PhD,[facility]  Medical Director, Immunology/Histocompatibility Laboratory  Pager [phone]

## 2020-05-18 NOTE — TELEPHONE ENCOUNTER
Called immunology lab as received message for an order request re: final crossmatch.  Lab will call if order is needed, offered to place order if still needed for transplant.  Lab to call coordinator back if any further issues arise.

## 2020-05-18 NOTE — PHARMACY-ADMISSION MEDICATION HISTORY
Admission medication history interview status for the 5/17/2020 admission is complete. See Epic admission navigator for allergy information, pharmacy, prior to admission medications and immunization status.     Medication history interview sources:  Patient, discharge summary    Changes made to PTA medication list (reason)  Added: none  Deleted: tamsulosin (used with kidney stone but stopped this med)  Changed: Lantus from 24 to 35 units QHS, added instruction to PRN oxycodone    Additional medication history information (including reliability of information, actions taken by pharmacist): Most recent warfarin dosing was 5mg daily. Last dose 5/16.      Prior to Admission medications    Medication Sig Last Dose Taking? Auth Provider   aspirin (ASA) 81 MG chewable tablet Take 1 tablet (81 mg) by mouth daily 5/17/2020 at Unknown time Yes Jenni Ortiz MD   carvedilol (COREG) 12.5 MG tablet Take 2 tablets (25 mg) by mouth 2 times daily (with meals) TAKE 2 TABLETS (25mg) BY MOUTH EVERY MORNING AND 2 TABLETS (25mg) EVERY AFTERNOON 5/17/2020 at am Yes Antonia Byrne APRN CNP   digoxin (LANOXIN) 125 MCG tablet Take 1 tablet (125 mcg) by mouth four times a week 5/15/2020 Yes Pippa Rodriguez, NP   famotidine (PEPCID) 20 MG tablet Take 1 tablet (20 mg) by mouth 2 times daily 5/17/2020 at Unknown time Yes Jenni Ortiz MD   hydrALAZINE (APRESOLINE) 50 MG tablet Take 1 tablet (50 mg) by mouth 3 times daily 5/17/2020 at Unknown time Yes Jenni Ortiz MD   insulin aspart (NOVOLOG PEN) 100 UNIT/ML pen Inject 1-10 Units Subcutaneous 4 times daily (with meals and nightly) 1-10 Units, Subcutaneous, 3 TIMES DAILY BEFORE MEALS, Correction Scale - HIGH INSULIN RESISTANCE DOSING     -164 =1 units.   -189 =2.   -214 =3.   -239 =4.   -264 =5.   -289 =6.   -314 =7.   -339 =8.   -364 =9.  BG greater than or equal to 365 give 10 units  To be given with meal  insulin, based on pre-meal BG  Yes Unknown, Entered By History   insulin aspart (NOVOLOG PEN) 100 UNIT/ML pen Prandial Dosin units per 7 grams of carbohydrate each Meal or Snack.  Only chart total amount of units given.  Do not give if pre-prandial glucose is less than 60 mg/dL. If given at mealtime, administer within 30 minutes of start of meal.  Yes Paola Chang MD   insulin glargine (LANTUS SOLOSTAR PEN) 100 UNIT/ML pen Inject 24 Units Subcutaneous At Bedtime  Patient taking differently: Inject 35 Units Subcutaneous At Bedtime   Yes Paola Chang MD   oxyCODONE (ROXICODONE) 5 MG tablet Take 5 mg by mouth every 4 hours as needed  Past Month at Unknown time Yes Reported, Patient   rosuvastatin (CRESTOR) 20 MG tablet Take 1 tablet (20 mg) by mouth At Bedtime  Yes Jenni Ortiz MD   warfarin ANTICOAGULANT (COUMADIN) 2.5 MG tablet Take 5 mg by mouth daily Take 2 tablets daily or as directed by coumadin clinic. 2020 Yes Jenni Ortiz MD   ACCU-CHEK [place] PLUS test strip    Reported, Patient   BD SILVANA U/F 32G X 4 MM insulin pen needle    Reported, Patient   nitroGLYcerin (NITROSTAT) 0.4 MG sublingual tablet Place 1 tablet (0.4 mg) under the tongue every 5 minutes as needed for chest pain For chest pain place 1 tablet under the tongue every 5 minutes for 3 doses. If symptoms persist 5 minutes after 1st dose call 911.   Jhonatan Sunshine MD         Medication history completed by: Carol Youssef, LucretiaD, BCPS

## 2020-05-18 NOTE — CONSULTS
Cardiology HF Consult         Date of Service: 05/19/20    ASSESSMENT:   Mariusz Sharp is a 59 year old male history of CAD (s/p STEMI with ALYSSA to LAD 6/27/18), LV thrombus, CKD Stage III, PAF, DM Type II, and ICM with EF 20-25% s/p HeartMate 3 LVAD implant 12/31/18 who presented for OHT 5/18/2020    Pressors: on levophed  Other drips: isoproterenol, propofol   Paced at    Hemodynamics this AM: CVP 12, PA 30/20, PCWP 18  - Give lasix 20mg IV x1     Serostatus: Donor unknown  CMV: D+, R-, EBV: D- R+, Toxo D- R-  Blood type: A     Immunosuppression:  -Calcineurin Inhibitor: due to renal dysfunction, anti-IL-2 given - Basiliximab 20 mg IV, second dose due 5/22; tacrolimus to follow pending renal function with goal level 10-15 for first 3 months  -Cell cycle Inhibitor: start Cellcept 1500 mg BID (generic 1500 mg given preoperatively)  -Steroid: methylprednisolone 1 gram given preoperatively, followed by 500 mg IV at release of cross clamp; start 125 mg IV q8 hours x 3 doses tomorrow morning, followed by prednisone 1 mg/kg daily (in BID dosing, taper 5 mg daily)  -Endomyocardial biopsy: first 5/25; weekly 4 weeks, bi-weekly 2 months, monthly 3-6 months     Infection Prophylaxis:  -PCP/Toxo: Bactrim holding for now given renal function   -Thrush: Nystatin  -CMV (D/R-): will start Valcyte pending renal function; 6 months total   -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 He was recently admitted 5/12-5/15 for nephrolithiasis c/b hydronephrosis and acute kidney injury. He underwent OHT 5/18. Hemodynamically stable post  procedure. Plan to extubate later today per CVTS.     PAST MEDICAL HISTORY:  Past Medical History:   Diagnosis Date     Acute kidney injury (H)      CKD (chronic kidney disease)      Coronary artery disease      Diabetes mellitus (H)      HTN (hypertension)      Hyperlipidemia      Ischemic cardiomyopathy      LV (left ventricular) mural thrombus      Paroxysmal atrial flutter (H)      RVF (right ventricular failure) (H)      Systolic heart failure (H)      Ventricular tachycardia (H)        CURRENT MEDICATIONS:  No current outpatient medications on file.       PAST SURGICAL HISTORY:  Past Surgical History:   Procedure Laterality Date     COLONOSCOPY N/A 12/7/2018    Procedure: COLONOSCOPY;  Surgeon: Krish Mercado MD;  Location: [facility] OR     CV RIGHT HEART CATH N/A 2/19/2019    Procedure: RHC;  Surgeon: Brian Long MD;  Location: [facility] HEART CARDIAC CATH LAB     CV RIGHT HEART CATH N/A 7/5/2019    Procedure: CV RIGHT HEART CATH;  Surgeon: Khris Mcclelland MD;  Location:  HEART CARDIAC CATH LAB     HC PRQ TRLUML CORONARY ANGIOPLASTY ADDL BRANCH      PCI and ALYSSA to ostial LAD on 6/27/18     IMPLANT AUTOMATIC IMPLANTABLE CARDIOVERTER DEFIBRILLATOR       INSERT VENTRICULAR ASSIST DEVICE LEFT (HEARTMATE II) N/A 12/31/2018    Procedure: Median Sternotomy, On Cardiopulmonary Bypass Pump, Insertion of Left Ventricular Assist Device (Heartmate III);  Surgeon: Kamaljit Baker MD;  Location[facility]       ALLERGIES   No Known Allergies    FAMILY HISTORY:  History reviewed. No pertinent family history.    SOCIAL HISTORY:  Social History     Socioeconomic History     Marital status: Single     Spouse name: None     Number of children: None     Years of education: None     Highest education level: None   Occupational History     None   Social Needs     Financial resource strain: None     Food insecurity     Worry: None     Inability: None     Transportation needs     Medical: None     Non-medical: None   Tobacco Use      Smoking status: Never Smoker     Smokeless tobacco: Never Used   Substance and Sexual Activity     Alcohol use: No     Frequency: Never     Drug use: No     Sexual activity: None   Lifestyle     Physical activity     Days per week: None     Minutes per session: None     Stress: None   Relationships     Social connections     Talks on phone: None     Gets together: None     Attends Hindu service: None     Active member of club or organization: None     Attends meetings of clubs or organizations: None     Relationship status: None     Intimate partner violence     Fear of current or ex partner: None     Emotionally abused: None     Physically abused: None     Forced sexual activity: None   Other Topics Concern     None   Social History Narrative     None     Review of Systems:   Patient intubated     Physical Exam   Temp: 98.3  F (36.8  C) Temp src: Axillary    Heart Rate: 99 Resp: 15 SpO2: 99 % O2 Device: Mechanical Ventilator    Vital Signs with Ranges  Temp:  [96.3  F (35.7  C)-98.3  F (36.8  C)] 98.3  F (36.8  C)  Heart Rate:  [] 99  Resp:  [14-16] 15  MAP:  [61 mmHg-109 mmHg] 74 mmHg  Arterial Line BP: ()/(52-84) 101/62  FiO2 (%):  [40 %] 40 %  SpO2:  [98 %-100 %] 99 %  192 lbs 9.6 oz    GEN: NAD, pleasant  HEENT: no icterus  CV: RRR, normal s1/s2, no murmurs/rubs/s3/s4, no heave. +PA catheter.   CHEST: CTAB  ABD: soft, NT/ND, NABS  : no flank/suprapubic tenderness  NEURO: AA&Ox3, fluent/appropriate, motor grossly nonfocal  PSYCH: cooperative, affect appropriate    Data   Recent Labs   Lab 05/19/20  0324 05/19/20  0009 05/18/20  2157 05/18/20  1835 05/18/20  1630  05/18/20  1248  05/18/20  0857   WBC 14.6*  --  13.0* 20.1*  --   --   --   --  6.2   HGB 8.4*  --  8.8* 9.6* 8.9*   < >  --    < > 12.0*   MCV 83  --  82 82  --   --   --   --  84     --  164 192 185  --   --   --  251   INR  --   --   --  1.54* 2.21*  --  1.42*  --   --      --  141 141 142   < >  --    < > 136    POTASSIUM 4.2 4.0 3.9 3.7 3.8   < >  --    < > 4.8   CHLORIDE 110*  --  112* 109  --   --   --   --  109   CO2 24  --  21 24  --   --   --   --  18*   BUN 38*  --  36* 39*  --   --   --   --  45*   CR 2.12*  --  1.85* 1.90*  --   --   --   --  1.95*   ANIONGAP 6  --  8 8  --   --   --   --  10   ARLENE 8.0*  --  7.5* 8.1*  --   --   --   --  8.7   *  --  169* 115* 179*   < >  --    < > 247*   ALBUMIN  --   --   --  2.8*  --   --   --   --  3.4   PROTTOTAL  --   --   --  5.4*  --   --   --   --  7.3   BILITOTAL  --   --   --  1.5*  --   --   --   --  1.0   ALKPHOS  --   --   --  34*  --   --   --   --  54   ALT  --   --   --  30  --   --   --   --  32   AST  --   --   --  112*  --   --   --   --  25    < > = values in this interval not displayed.       Recent Results (from the past 24 hour(s))   Cardiac Device Check - Inpatient   Result Value    Date Time Interrogation Session [id number]    Implantable Pulse Generator  [employer]    Implantable Pulse Generator Model D151 DYNAGEN    Implantable Pulse Generator Serial Number [id number]    Type Interrogation Session In Clinic    [facility]    Implantable Pulse Generator Type Defibrillator    Implantable Pulse Generator Implant Date 20180703    Implantable Lead  [employer]    Implantable Lead Model 7742 Ingevity MRI    Implantable Lead Serial Number [id number]    Implantable Lead Implant Date 20180703    Implantable Lead Polarity Type Bipolar Lead    Implantable Lead Location Detail 1 UNKNOWN    Implantable Lead Special Function IS-1 Bipolar    Implantable Lead Location Right Ventricle    Implantable Lead  [employer]ant    Implantable Lead Model 0180 [place] SG    Implantable Lead Serial Number [id number]    Implantable Lead Implant Date 20180703    Implantable Lead Polarity Type Bipolar Lead    Implantable Lead Location Detail 1 UNKNOWN    Implantable Lead Special Function Endo-Distal     Implantable Lead Location Right Ventricle    James Setting Mode (NBG Code) VVI    James Setting Lower Rate Limit 40    James Setting Hysterisis Rate Off    Lead Channel Setting Sensing Polarity Bipolar    Lead Channel Setting Sensing Sensitivity 0.6    Lead Channel Setting Sensing Adaptation Mode Adaptive    Lead Channel Setting Pacing Polarity Bipolar    Lead Channel Setting Pacing Pulse Width 1.0    Lead Channel Setting Pacing Amplitude 2.2    Lead Channel Setting Pacing Capture Mode Fixed Pacing    Zone Setting Type Category VF    Zone Setting Vendor Type Category VF    Zone Setting Detection Interval 300.0    Zone Setting Type Category VT    Zone Setting Vendor Type Category VT    Zone Setting Detection Interval 375.0    Zone Setting Type Category VT    Zone Setting Vendor Type Category VT-1    Lead Channel Impedance Value 969.0    Lead Channel Sensing Intrinsic Amplitude 19.8    Lead Channel Pacing Threshold Amplitude 0.6000    Lead Channel Pacing Threshold Pulse Width 1.0    Battery Date Time of Measurements 94454652016006    Battery Status Beginning of Service    Battery Remaining Longevity 144    Capacitor Charge Type Reformation    Capacitor Last Charge Date Time 04361091619892    Capacitor Charge Time 9.563    Capacitor Charge Type Shock    Capacitor Charge Time 8.395    Capacitor Charge Energy 41.0    James Statistic Date Time Start 85529268450478    James Statistic Date Time End 21211928695361    James Statistic RV Percent Paced 1    Therapy Statistic Total Shocks Delivered 5    Therapy Statistic Total Shocks Aborted 0    Therapy Statistic Total ATP Delivered 9    Therapy Statistic Total  Date Time End 68702563912271    Therapy Statistic Recent Shocks Delivered 0    Therapy Statistic Recent Shocks Aborted 0    Therapy Statistic Recent ATP Delivered 0    Therapy Statistic Recent Date Time Start 28572639551840    Therapy Statistic Recent Date Time End 96500931850309    Episode Statistic Total Count 49     Episode Statistic Type Category VT    Episode Statistic Vendor Type Category NSVT    Episode Statistic Total Count 6    Episode Statistic Type Category VF    Episode Statistic Vendor Type Category VF    Episode Statistic Total Count 0    Episode Statistic Type Category VF_VT_MONITOR    Episode Statistic Total Date Time End 20200518000000    Episode Statistic Total Date Time End 20200518000000    Episode Statistic Total Date Time End 20200518000000    Episode Statistic Recent Count 0    Episode Statistic Type Category VT    Episode Statistic Vendor Type Category NSVT    Episode Statistic Recent Count 0    Episode Statistic Type Category VF    Episode Statistic Vendor Type Category VF    Episode Statistic Recent Count 0    Episode Statistic Type Category VF_VT_MONITOR    Episode Statistic Recent Date Time Start 20191204090301    Episode Statistic Recent Date Time End 20200518000000    Episode Statistic Recent Date Time Start 20191204090301    Episode Statistic Recent Date Time End 20200518000000    Episode Statistic Recent Date Time Start 20191204090301    Episode Statistic Recent Date Time End 20200518000000    Narrative    Patient seen on 3 C  for evaluation and iterative programming of a Jampp Single lead ICD per MD orders. Patient is scheduled for a heart transplant today. Normal ICD function. No episodes/arrhythmias recorded. Intrinsic rhythm = Regular VS @ 64 bpm.  = <1%. Estimated battery longevity to QUIQUE = 12 years. Lead trends appear stable. Patient reports that he is feeling well and denies complaints. Per Anesthesia orders Tachy therapies programmed OFF. LESTER Bryant RN, BSN.     Single lead ICD    I have reviewed and interpreted the device interrogation, settings, programming and nurse's summary. The device is functioning within normal device parameters. I agree with the current findings, assessment and plan.   XR Chest Port 1 View    Narrative    EXAMINATION:  XR CHEST PORT 1 VW 5/18/2020  6:39 PM.    COMPARISON: 5/18/2020 at 0008 hours.    HISTORY:  s/p heart txp    FINDINGS: Portable AP view of the chest. Postsurgical changes of heart  transplant. Endotracheal tube tip projects over the mid thoracic  trachea. Right IJ Pampa-Calvin catheter tip projects over the pulmonary  trunk. Bibasilar chest tubes and 2 mediastinal drains are present.  Epicardial pacer wires are noted. Intact median sternotomy wires.  Retained catheter in the left upper chest at the site of prior  pacemaker.    Midline trachea. Cardiomediastinal silhouette is within normal limits.  Pulmonary vasculature is distinct. Interstitial prominence suggestive  of pulmonary vascular congestion. Trace bilateral pleural effusions.  No appreciable pneumothorax. Upper abdomen is unremarkable.      Impression    IMPRESSION:   1. Postsurgical changes of cardiac transplant with support devices in  appropriate position.  2. Mild pulmonary vascular congestion.  3. Retained catheter in the left upper chest at the site of prior  pacemaker.    I have personally reviewed the examination and initial interpretation  and I agree with the findings.    HIPOLITO DEL ANGEL MD   XR Abdomen Port 1 View    Narrative    XR ABDOMEN PORT 1 VW  5/18/2020 10:17 PM    History:  OG placement.     Comparison: CT abdominal/pelvis dated 5/12/2020    Findings:   50 degrees AP abdominal radiograph. Gastric tube with the tip and  side-port projects over stomach. Nonobstructive bowel gas pattern.  Gallstones projects over right upper quadrant.    Partially visualized Pampa-Calvin catheter, bilateral chest tubes and  mediastinal drainage tube. Mild degenerative changes of thoracolumbar  spine.      Impression    IMPRESSION:  1. Gastric tube with the tip and side-port projects over stomach.  Nonobstructive bowel gas pattern.  2. Cholelithiasis.    I have personally reviewed the examination and initial interpretation  and I agree with the findings.    JHOANA KHAN MD   XR  Chest Port 1 View    Impression    IMPRESSION:  1. The tip of the right IJ Candia-Calvin catheter projects over the  pulmonary trunk.  2. The tip of the endotracheal tube is approximately 2 cm above the  kris.  3. The remaining support devices are stable in positioning.  4. Mild bilateral perihilar and basilar interstitial opacities, likely  pulmonary edema versus atelectasis.  5. Postsurgical changes of cardiac transplantation.    Findings discussed with the senior radiology resident, Dr. Sahil Mirza.

## 2020-05-18 NOTE — OP NOTE
OPERATIVE DATE: 5/18/2020    PRE-OPERATIVE DIAGNOSIS:  1) Ischemic cardiomyopathy and heart failure  Patient Active Problem List   Diagnosis     Heart failure (H)     ICD (implantable cardioverter-defibrillator), single chamber- NOT dependent     Type 2 diabetes mellitus with hyperglycemia, with long-term current use of insulin (H)     Weakness     LVAD (left ventricular assist device) present (H)     Chronic systolic congestive heart failure (H)     ASCVD (arteriosclerotic cardiovascular disease)     ANDREW (acute kidney injury) (H)     Cardiogenic shock (H)     CKD (chronic kidney disease) stage 3, GFR 30-59 ml/min (H)     Dyslipidemia     Hypotension, unspecified hypotension type     Hematuria     Left ventricular apical thrombus following MI (H)     Long term current use of anticoagulant     Monomorphic ventricular tachycardia (H)     ST elevation myocardial infarction involving left anterior descending (LAD) coronary artery (H)     Type 2 diabetes mellitus with hyperglycemia (H)     Unilateral inguinal hernia     Ureteral obstruction     Hydronephrosis       POST-OPERATIVE DIAGNOSIS:  1)  Ischemic cardiomyopathy and heart failure  Patient Active Problem List   Diagnosis     Heart failure (H)     ICD (implantable cardioverter-defibrillator), single chamber- NOT dependent     Type 2 diabetes mellitus with hyperglycemia, with long-term current use of insulin (H)     Weakness     LVAD (left ventricular assist device) present (H)     Chronic systolic congestive heart failure (H)     ASCVD (arteriosclerotic cardiovascular disease)     ANDREW (acute kidney injury) (H)     Cardiogenic shock (H)     CKD (chronic kidney disease) stage 3, GFR 30-59 ml/min (H)     Dyslipidemia     Hypotension, unspecified hypotension type     Hematuria     Left ventricular apical thrombus following MI (H)     Long term current use of anticoagulant     Monomorphic ventricular tachycardia (H)     ST elevation myocardial infarction involving left  anterior descending (LAD) coronary artery (H)     Type 2 diabetes mellitus with hyperglycemia (H)     Unilateral inguinal hernia     Ureteral obstruction     Hydronephrosis       PROCEDURE:  1) Redo median sternotomy  2) Recipient cardiectomy  3) LVAD explant  4) Orthotopic heart transplant  5) Removal of AICD  6) Transesophageal echocardiography    SURGEON: Elder Willis MD    COSURGEON: Kamaljit Baker MD    FELLOW: Brenda Guevara MD    ANESTHESIA: GETA    ESTIMATED BLOOD LOSS: 1000cc    OPERATIVE FINDINGS:  1) Severe dilated cardiomyopathy  2) Donor heart right ventricular function adequate  3) Donor heart left ventricular function adequate  4) No patent foramen ovale    CARDIOPULMONARY BYPASS TIME: 110 minutes    DONOR HEART TOTAL ISCHEMIC TIME: 267 minutes    INDICATIONS:  Mr. Sharp is a 59yoM admitted with heart failure secondary to ischemic cardiomyopathy.  He has been listed for heart transplant.  A suitable donor heart became available.  The patient and his family were counseled about heart transplant.  Risks and benefits of the operation were explained to the patient and their family including, but not limited to, bleeding, infection, stroke and even death.  They understood these risks and agreed to proceed electively.    OPERATIVE REPORT:  The patient was transferred to the operating room and positioned supine on the OR table.  General anesthesia was initiated by the anesthesia team.  Endotracheal intubation and central venous access was performed by anesthesia.  The patients neck, chest, abdomen and bilateral lower extremities were clipped, prepped and draped in sterile fashion.  A pre-procedure time-out was performed confirming the correct patient, correct site and correct procedure.    A redo median sternotomy was performed.  We worked for several minutes to free intrapericardial adhesions.  The patient was given 300 mg/kg IV heparin.  Pledgeted 2-0 ethibond pursestring was made in the ascending  aorta.  A 18F EOPA arterial cannula was placed here and connected to the bypass circuit.  A 2-0 ethibond pursestring was made in the right atrial appendage.  Bicaval venous cannulation with 24F SVC cannula and 28F IVC cannula was performed via 2-0 ethibond pursestrings.  Cardiopulmonary bypass was initiated with good flows.  The cavae were snared.  A large aortic cross clamp was applied.  The recipient cardiectomy was performed using bicaval technique.  The field was flooded with CO2.    ABO verification of the donor heart was performed and correct.  The donor heart was delivered.  The heart was wrapped in iced laps.  The donor heart was implanted in bicaval fashion.  First the left atrial anastomosis was created using running 3-0 prolene stitch.  An LV vent was placed.  Next the IVC anastomosis was completed with running 4-0 prolene.  Next the pulmonary artery anastomosis was completed using running 4-0 prolene.  Next the aortic anastomosis was completed using running 4-0 prolene in two layers.  Finally the SVC anastomosis was completed using running 4-0 prolene.      A dose of IV steroids was administered.  A DLP root vent was placed via pledgeted 4-0 prolene pursestring.  The heart was deaired.  Next the aortic cross clamp was removed.  Ventricular pacing wires were placed.  The heart was paced at 90 beats per minute.  Isoproterinol drip was started.  The donor heart was resuscitated on bypass for 30 minutes.      Next we focused on weaning from cardiopulmonary bypass.  The LV vent was removed.  The root vent was removed.  The lungs were ventilated and IV calcium was administered.  Next flow on the bypass circuit was weaned off.  The patient tolerated this well.  The IVC cannula was removed, pursestring tied, and oversewed with 4-0 prolene.  The SVC cannula was removed, pursestring tied, and oversewed with 4-0 prolene.   The remaining pump blood was returned to the patient.  Test dose protamine was administered.   The aortic cannula was removed.  The pursestrings were tied.  This was oversewed with pledgeted 4-0 prolene.  Donor heart function was adequate on low doses of inotropic support.     The sternal retractor was removed.  Two #9 straight argyle mediastinal chest tubes, a 24F mediastinal tee drain and bilateral 28F pleural chest tubes were placed.  These were delivered through the anterior abdominal wall and secured to the skin with 0-ethibond stitches.      Atrial pacing wires were placed.    The AICD pocket was opened.  The AICD and leads was removed.  The access site was oversewed.  The wound was closed in layers using vicryl stitches.      The wound was made hemostatic with cautery.  The subcutaneous tissue, sternal edges, thymic fat, pericardial edges and all anastomotic and cannulation sites were inspected and hemostatic.    The wound was irrigated with warm antibiotic saline.  The sternum was reapproximated with sternal wires.    The wound was made hemostatic then closed in layers using vicryl stitches.  The subcutaneous tissue was closed with 2-0 vicryl stitches.  The skin was closed with 3-0 vicryl.  Dermabond skin glue was applied.  A sterile dressing was applied.      The abdominal portion of driveline was removed.  The wound was packed with iodoform.    The patient was then transferred from the operating bed to an ICU bed and transferred to the ICU in critical, but stable, condition.    All needle, sponge and instrument counts were correct at the end of the case.    Elder Willis  Cardiothoracic Surgery  Pager: [phone]

## 2020-05-18 NOTE — ANESTHESIA PROCEDURE NOTES
Central Line Procedure Note  Staff -   Anesthesiologist:  Elder Waldrop MD  Resident/Fellow: Som Manzano MD    Performed By: resident  Procedure performed by resident/CRNA in presence of a teaching physician.        Location: In OR after induction  Procedure Start/Stop Times:     patient identified, IV checked, site marked, risks and benefits discussed, informed consent, monitors and equipment checked, pre-op evaluation and at physician/surgeon's request      Correct Patient: Yes      Correct Position: Yes      Correct Site: Yes      Correct Procedure: Yes      Correct Laterality:  Yes    Site Marked:  Yes  Line Placement:     Procedure:  Central Line    Insertion laterality:  Right    Insertion site:  Internal Jugular    Position:  Trendelenburg      Maximal Sterile Barriers: no medical exclusion documented for following all elements of maximal sterile barrier technique         Injection Technique:  Ultrasound guided and Seldinger Technique    Sterile Ultrasound Technique:  Sterile probe cover and Sterile gel    Vein evaluated via U/S for patency/adequacy of catheter insertion and is adequate.  Using realtime U/S imaging the vein was punctured, and needle was observed entering vein on U/S      Local skin infiltration:  None    Catheter size:  9 Fr, 2 lumen 11.5 cm (MAC)    Catheter length at skin (cm):  15    Cath secured with: suture      Dressing:  Tegaderm and Biopatch    Complications:  None obvious    Blood aspirated all lumens: Yes      All Lumens Flushed: Yes      Verification method:  Placement to be verified post-op{

## 2020-05-18 NOTE — TELEPHONE ENCOUNTER
Provider Call: General  Route to LPN    Reason for call: Rudy from Immunology lab  They jay final crossmatch on pt but do not have any orders      Call back needed? No

## 2020-05-18 NOTE — PROGRESS NOTES
Admission    Diagnosis: pre- op heart txp  Admitted from: home in [place]  Via: Wheelchair  Accompanied by: self  Belongings: Placed in closet; valuables in room with family, declined sending any items to security.  Admission Profile: Complete  Teaching: orientation to unit, call don't fall, use of console, meal times, visiting hours, when to call for the RN (angina/sob/dizziness, etc.), and enforced importance of safety   Access: PIV Left  Telemetry: Placed on patient  Height/Weight: Complete    Pre-op checklist complete, except for pre-op scrub.

## 2020-05-19 ENCOUNTER — APPOINTMENT (OUTPATIENT)
Dept: GENERAL RADIOLOGY | Facility: CLINIC | Age: 60
DRG: 001 | End: 2020-05-19
Attending: STUDENT IN AN ORGANIZED HEALTH CARE EDUCATION/TRAINING PROGRAM
Payer: COMMERCIAL

## 2020-05-19 ENCOUNTER — APPOINTMENT (OUTPATIENT)
Dept: PHYSICAL THERAPY | Facility: CLINIC | Age: 60
DRG: 001 | End: 2020-05-19
Attending: SURGERY
Payer: COMMERCIAL

## 2020-05-19 LAB
ALBUMIN SERPL-MCNC: 3 G/DL (ref 3.4–5)
ALP SERPL-CCNC: 27 U/L (ref 40–150)
ALT SERPL W P-5'-P-CCNC: 26 U/L (ref 0–70)
ANION GAP SERPL CALCULATED.3IONS-SCNC: 11 MMOL/L (ref 3–14)
ANION GAP SERPL CALCULATED.3IONS-SCNC: 6 MMOL/L (ref 3–14)
ANION GAP SERPL CALCULATED.3IONS-SCNC: 8 MMOL/L (ref 3–14)
ANION GAP SERPL CALCULATED.3IONS-SCNC: 9 MMOL/L (ref 3–14)
AST SERPL W P-5'-P-CCNC: 98 U/L (ref 0–45)
BASE DEFICIT BLDA-SCNC: 2 MMOL/L
BASE DEFICIT BLDA-SCNC: 3.6 MMOL/L
BASE DEFICIT BLDA-SCNC: 4.1 MMOL/L
BASE DEFICIT BLDA-SCNC: 7.5 MMOL/L
BASE DEFICIT BLDV-SCNC: 1.3 MMOL/L
BASE DEFICIT BLDV-SCNC: 2 MMOL/L
BASE DEFICIT BLDV-SCNC: 2.4 MMOL/L
BASE DEFICIT BLDV-SCNC: 2.9 MMOL/L
BASE DEFICIT BLDV-SCNC: 3.4 MMOL/L
BASE DEFICIT BLDV-SCNC: 3.5 MMOL/L
BILIRUB DIRECT SERPL-MCNC: 0.3 MG/DL (ref 0–0.2)
BILIRUB SERPL-MCNC: 1 MG/DL (ref 0.2–1.3)
BUN SERPL-MCNC: 36 MG/DL (ref 7–30)
BUN SERPL-MCNC: 38 MG/DL (ref 7–30)
BUN SERPL-MCNC: 38 MG/DL (ref 7–30)
BUN SERPL-MCNC: 43 MG/DL (ref 7–30)
CALCIUM SERPL-MCNC: 7.6 MG/DL (ref 8.5–10.1)
CALCIUM SERPL-MCNC: 7.7 MG/DL (ref 8.5–10.1)
CALCIUM SERPL-MCNC: 8 MG/DL (ref 8.5–10.1)
CALCIUM SERPL-MCNC: 8.4 MG/DL (ref 8.5–10.1)
CHLORIDE SERPL-SCNC: 106 MMOL/L (ref 94–109)
CHLORIDE SERPL-SCNC: 110 MMOL/L (ref 94–109)
CHLORIDE SERPL-SCNC: 110 MMOL/L (ref 94–109)
CHLORIDE SERPL-SCNC: 112 MMOL/L (ref 94–109)
CO2 SERPL-SCNC: 20 MMOL/L (ref 20–32)
CO2 SERPL-SCNC: 21 MMOL/L (ref 20–32)
CO2 SERPL-SCNC: 21 MMOL/L (ref 20–32)
CO2 SERPL-SCNC: 24 MMOL/L (ref 20–32)
CREAT SERPL-MCNC: 2.12 MG/DL (ref 0.66–1.25)
CREAT SERPL-MCNC: 2.4 MG/DL (ref 0.66–1.25)
CREAT SERPL-MCNC: 2.59 MG/DL (ref 0.66–1.25)
CREAT SERPL-MCNC: 2.72 MG/DL (ref 0.66–1.25)
ERYTHROCYTE [DISTWIDTH] IN BLOOD BY AUTOMATED COUNT: 15.9 % (ref 10–15)
ERYTHROCYTE [DISTWIDTH] IN BLOOD BY AUTOMATED COUNT: 16.3 % (ref 10–15)
ERYTHROCYTE [DISTWIDTH] IN BLOOD BY AUTOMATED COUNT: 16.4 % (ref 10–15)
ERYTHROCYTE [DISTWIDTH] IN BLOOD BY AUTOMATED COUNT: 16.7 % (ref 10–15)
GFR SERPL CREATININE-BSD FRML MDRD: 25 ML/MIN/{1.73_M2}
GFR SERPL CREATININE-BSD FRML MDRD: 26 ML/MIN/{1.73_M2}
GFR SERPL CREATININE-BSD FRML MDRD: 29 ML/MIN/{1.73_M2}
GFR SERPL CREATININE-BSD FRML MDRD: 33 ML/MIN/{1.73_M2}
GLUCOSE BLDC GLUCOMTR-MCNC: 101 MG/DL (ref 70–99)
GLUCOSE BLDC GLUCOMTR-MCNC: 108 MG/DL (ref 70–99)
GLUCOSE BLDC GLUCOMTR-MCNC: 115 MG/DL (ref 70–99)
GLUCOSE BLDC GLUCOMTR-MCNC: 141 MG/DL (ref 70–99)
GLUCOSE BLDC GLUCOMTR-MCNC: 157 MG/DL (ref 70–99)
GLUCOSE BLDC GLUCOMTR-MCNC: 159 MG/DL (ref 70–99)
GLUCOSE BLDC GLUCOMTR-MCNC: 167 MG/DL (ref 70–99)
GLUCOSE BLDC GLUCOMTR-MCNC: 185 MG/DL (ref 70–99)
GLUCOSE BLDC GLUCOMTR-MCNC: 193 MG/DL (ref 70–99)
GLUCOSE BLDC GLUCOMTR-MCNC: 207 MG/DL (ref 70–99)
GLUCOSE BLDC GLUCOMTR-MCNC: 210 MG/DL (ref 70–99)
GLUCOSE BLDC GLUCOMTR-MCNC: 214 MG/DL (ref 70–99)
GLUCOSE BLDC GLUCOMTR-MCNC: 243 MG/DL (ref 70–99)
GLUCOSE BLDC GLUCOMTR-MCNC: 256 MG/DL (ref 70–99)
GLUCOSE BLDC GLUCOMTR-MCNC: 88 MG/DL (ref 70–99)
GLUCOSE SERPL-MCNC: 128 MG/DL (ref 70–99)
GLUCOSE SERPL-MCNC: 164 MG/DL (ref 70–99)
GLUCOSE SERPL-MCNC: 217 MG/DL (ref 70–99)
GLUCOSE SERPL-MCNC: 283 MG/DL (ref 70–99)
HCO3 BLD-SCNC: 17 MMOL/L (ref 21–28)
HCO3 BLD-SCNC: 21 MMOL/L (ref 21–28)
HCO3 BLD-SCNC: 21 MMOL/L (ref 21–28)
HCO3 BLD-SCNC: 23 MMOL/L (ref 21–28)
HCO3 BLDV-SCNC: 22 MMOL/L (ref 21–28)
HCO3 BLDV-SCNC: 22 MMOL/L (ref 21–28)
HCO3 BLDV-SCNC: 23 MMOL/L (ref 21–28)
HCO3 BLDV-SCNC: 24 MMOL/L (ref 21–28)
HCT VFR BLD AUTO: 26 % (ref 40–53)
HCT VFR BLD AUTO: 26.4 % (ref 40–53)
HCT VFR BLD AUTO: 26.7 % (ref 40–53)
HCT VFR BLD AUTO: 28 % (ref 40–53)
HGB BLD-MCNC: 8 G/DL (ref 13.3–17.7)
HGB BLD-MCNC: 8.2 G/DL (ref 13.3–17.7)
HGB BLD-MCNC: 8.4 G/DL (ref 13.3–17.7)
HGB BLD-MCNC: 8.7 G/DL (ref 13.3–17.7)
INTERPRETATION ECG - MUSE: NORMAL
LACTATE BLD-SCNC: 1.5 MMOL/L (ref 0.7–2)
LACTATE BLD-SCNC: 1.6 MMOL/L (ref 0.7–2)
LACTATE BLD-SCNC: 2 MMOL/L (ref 0.7–2)
LACTATE BLD-SCNC: 2.4 MMOL/L (ref 0.7–2)
MAGNESIUM SERPL-MCNC: 2.4 MG/DL (ref 1.6–2.3)
MCH RBC QN AUTO: 25.8 PG (ref 26.5–33)
MCH RBC QN AUTO: 26 PG (ref 26.5–33)
MCH RBC QN AUTO: 26 PG (ref 26.5–33)
MCH RBC QN AUTO: 26.2 PG (ref 26.5–33)
MCHC RBC AUTO-ENTMCNC: 30.8 G/DL (ref 31.5–36.5)
MCHC RBC AUTO-ENTMCNC: 31.1 G/DL (ref 31.5–36.5)
MCHC RBC AUTO-ENTMCNC: 31.1 G/DL (ref 31.5–36.5)
MCHC RBC AUTO-ENTMCNC: 31.5 G/DL (ref 31.5–36.5)
MCV RBC AUTO: 83 FL (ref 78–100)
MCV RBC AUTO: 84 FL (ref 78–100)
O2/TOTAL GAS SETTING VFR VENT: 21 %
O2/TOTAL GAS SETTING VFR VENT: 40 %
O2/TOTAL GAS SETTING VFR VENT: NORMAL %
O2/TOTAL GAS SETTING VFR VENT: NORMAL %
OXYHGB MFR BLDV: 52 %
OXYHGB MFR BLDV: 53 %
OXYHGB MFR BLDV: 56 %
OXYHGB MFR BLDV: 63 %
OXYHGB MFR BLDV: 68 %
PCO2 BLD: 27 MM HG (ref 35–45)
PCO2 BLD: 33 MM HG (ref 35–45)
PCO2 BLD: 38 MM HG (ref 35–45)
PCO2 BLD: 39 MM HG (ref 35–45)
PCO2 BLDV: 41 MM HG (ref 40–50)
PCO2 BLDV: 42 MM HG (ref 40–50)
PCO2 BLDV: 42 MM HG (ref 40–50)
PH BLD: 7.35 PH (ref 7.35–7.45)
PH BLD: 7.39 PH (ref 7.35–7.45)
PH BLD: 7.4 PH (ref 7.35–7.45)
PH BLD: 7.4 PH (ref 7.35–7.45)
PH BLDV: 7.33 PH (ref 7.32–7.43)
PH BLDV: 7.34 PH (ref 7.32–7.43)
PH BLDV: 7.34 PH (ref 7.32–7.43)
PH BLDV: 7.36 PH (ref 7.32–7.43)
PH BLDV: 7.36 PH (ref 7.32–7.43)
PH BLDV: 7.37 PH (ref 7.32–7.43)
PHOSPHATE SERPL-MCNC: 2.5 MG/DL (ref 2.5–4.5)
PLATELET # BLD AUTO: 177 10E9/L (ref 150–450)
PLATELET # BLD AUTO: 181 10E9/L (ref 150–450)
PLATELET # BLD AUTO: 184 10E9/L (ref 150–450)
PLATELET # BLD AUTO: 192 10E9/L (ref 150–450)
PO2 BLD: 124 MM HG (ref 80–105)
PO2 BLD: 131 MM HG (ref 80–105)
PO2 BLD: 132 MM HG (ref 80–105)
PO2 BLD: 153 MM HG (ref 80–105)
PO2 BLDV: 30 MM HG (ref 25–47)
PO2 BLDV: 32 MM HG (ref 25–47)
PO2 BLDV: 33 MM HG (ref 25–47)
PO2 BLDV: 36 MM HG (ref 25–47)
PO2 BLDV: 38 MM HG (ref 25–47)
PO2 BLDV: 41 MM HG (ref 25–47)
POTASSIUM SERPL-SCNC: 4 MMOL/L (ref 3.4–5.3)
POTASSIUM SERPL-SCNC: 4 MMOL/L (ref 3.4–5.3)
POTASSIUM SERPL-SCNC: 4.2 MMOL/L (ref 3.4–5.3)
POTASSIUM SERPL-SCNC: 4.2 MMOL/L (ref 3.4–5.3)
POTASSIUM SERPL-SCNC: 4.4 MMOL/L (ref 3.4–5.3)
PROT SERPL-MCNC: 5.4 G/DL (ref 6.8–8.8)
PSA FREE MFR SERPL: 16 %
PSA FREE SERPL-MCNC: 1.2 NG/ML
PSA SERPL-MCNC: 7.3 NG/ML (ref 0–4)
RADIOLOGIST FLAGS: ABNORMAL
RBC # BLD AUTO: 3.1 10E12/L (ref 4.4–5.9)
RBC # BLD AUTO: 3.15 10E12/L (ref 4.4–5.9)
RBC # BLD AUTO: 3.21 10E12/L (ref 4.4–5.9)
RBC # BLD AUTO: 3.35 10E12/L (ref 4.4–5.9)
SODIUM SERPL-SCNC: 137 MMOL/L (ref 133–144)
SODIUM SERPL-SCNC: 140 MMOL/L (ref 133–144)
SODIUM SERPL-SCNC: 140 MMOL/L (ref 133–144)
SODIUM SERPL-SCNC: 141 MMOL/L (ref 133–144)
WBC # BLD AUTO: 14.6 10E9/L (ref 4–11)
WBC # BLD AUTO: 16.8 10E9/L (ref 4–11)
WBC # BLD AUTO: 17.5 10E9/L (ref 4–11)
WBC # BLD AUTO: 17.7 10E9/L (ref 4–11)

## 2020-05-19 PROCEDURE — 40000014 ZZH STATISTIC ARTERIAL MONITORING DAILY

## 2020-05-19 PROCEDURE — 85027 COMPLETE CBC AUTOMATED: CPT | Performed by: SURGERY

## 2020-05-19 PROCEDURE — 40000048 ZZH STATISTIC DAILY SWAN MONITORING

## 2020-05-19 PROCEDURE — 80048 BASIC METABOLIC PNL TOTAL CA: CPT | Performed by: SURGERY

## 2020-05-19 PROCEDURE — 25000128 H RX IP 250 OP 636: Performed by: STUDENT IN AN ORGANIZED HEALTH CARE EDUCATION/TRAINING PROGRAM

## 2020-05-19 PROCEDURE — 25000131 ZZH RX MED GY IP 250 OP 636 PS 637: Performed by: THORACIC SURGERY (CARDIOTHORACIC VASCULAR SURGERY)

## 2020-05-19 PROCEDURE — 82803 BLOOD GASES ANY COMBINATION: CPT | Performed by: INTERNAL MEDICINE

## 2020-05-19 PROCEDURE — 94003 VENT MGMT INPAT SUBQ DAY: CPT

## 2020-05-19 PROCEDURE — 97530 THERAPEUTIC ACTIVITIES: CPT | Mod: GP

## 2020-05-19 PROCEDURE — 25800030 ZZH RX IP 258 OP 636: Performed by: SURGERY

## 2020-05-19 PROCEDURE — 25000128 H RX IP 250 OP 636: Performed by: SURGERY

## 2020-05-19 PROCEDURE — 25000125 ZZHC RX 250: Performed by: THORACIC SURGERY (CARDIOTHORACIC VASCULAR SURGERY)

## 2020-05-19 PROCEDURE — 82803 BLOOD GASES ANY COMBINATION: CPT | Performed by: SURGERY

## 2020-05-19 PROCEDURE — 99222 1ST HOSP IP/OBS MODERATE 55: CPT | Mod: GC | Performed by: INTERNAL MEDICINE

## 2020-05-19 PROCEDURE — 83605 ASSAY OF LACTIC ACID: CPT | Performed by: SURGERY

## 2020-05-19 PROCEDURE — 25800030 ZZH RX IP 258 OP 636: Performed by: THORACIC SURGERY (CARDIOTHORACIC VASCULAR SURGERY)

## 2020-05-19 PROCEDURE — 99233 SBSQ HOSP IP/OBS HIGH 50: CPT | Performed by: INTERNAL MEDICINE

## 2020-05-19 PROCEDURE — 71045 X-RAY EXAM CHEST 1 VIEW: CPT

## 2020-05-19 PROCEDURE — 80076 HEPATIC FUNCTION PANEL: CPT | Performed by: SURGERY

## 2020-05-19 PROCEDURE — 25000128 H RX IP 250 OP 636: Performed by: THORACIC SURGERY (CARDIOTHORACIC VASCULAR SURGERY)

## 2020-05-19 PROCEDURE — 25000125 ZZHC RX 250: Performed by: SURGERY

## 2020-05-19 PROCEDURE — 00000146 ZZHCL STATISTIC GLUCOSE BY METER IP

## 2020-05-19 PROCEDURE — 20000004 ZZH R&B ICU UMMC

## 2020-05-19 PROCEDURE — 83735 ASSAY OF MAGNESIUM: CPT | Performed by: SURGERY

## 2020-05-19 PROCEDURE — 25000132 ZZH RX MED GY IP 250 OP 250 PS 637: Performed by: SURGERY

## 2020-05-19 PROCEDURE — 40000275 ZZH STATISTIC RCP TIME EA 10 MIN

## 2020-05-19 PROCEDURE — C9113 INJ PANTOPRAZOLE SODIUM, VIA: HCPCS | Performed by: SURGERY

## 2020-05-19 PROCEDURE — 84100 ASSAY OF PHOSPHORUS: CPT | Performed by: SURGERY

## 2020-05-19 PROCEDURE — 82805 BLOOD GASES W/O2 SATURATION: CPT | Performed by: SURGERY

## 2020-05-19 PROCEDURE — 84132 ASSAY OF SERUM POTASSIUM: CPT | Performed by: SURGERY

## 2020-05-19 PROCEDURE — 40000196 ZZH STATISTIC RAPCV CVP MONITORING

## 2020-05-19 PROCEDURE — 97162 PT EVAL MOD COMPLEX 30 MIN: CPT | Mod: GP

## 2020-05-19 RX ORDER — SENNOSIDES 8.6 MG
8.6 TABLET ORAL 2 TIMES DAILY PRN
Status: DISCONTINUED | OUTPATIENT
Start: 2020-05-20 | End: 2020-05-29 | Stop reason: HOSPADM

## 2020-05-19 RX ORDER — HEPARIN SODIUM 5000 [USP'U]/.5ML
5000 INJECTION, SOLUTION INTRAVENOUS; SUBCUTANEOUS EVERY 8 HOURS
Status: DISCONTINUED | OUTPATIENT
Start: 2020-05-19 | End: 2020-05-27

## 2020-05-19 RX ORDER — NICOTINE POLACRILEX 4 MG
15-30 LOZENGE BUCCAL
Status: DISCONTINUED | OUTPATIENT
Start: 2020-05-19 | End: 2020-05-20

## 2020-05-19 RX ORDER — PIPERACILLIN SODIUM, TAZOBACTAM SODIUM 2; .25 G/10ML; G/10ML
2.25 INJECTION, POWDER, LYOPHILIZED, FOR SOLUTION INTRAVENOUS EVERY 6 HOURS
Status: DISCONTINUED | OUTPATIENT
Start: 2020-05-19 | End: 2020-05-21

## 2020-05-19 RX ORDER — FUROSEMIDE 10 MG/ML
20 INJECTION INTRAMUSCULAR; INTRAVENOUS ONCE
Status: COMPLETED | OUTPATIENT
Start: 2020-05-19 | End: 2020-05-19

## 2020-05-19 RX ORDER — PREDNISONE 20 MG/1
40 TABLET ORAL 2 TIMES DAILY WITH MEALS
Status: COMPLETED | OUTPATIENT
Start: 2020-05-20 | End: 2020-05-21

## 2020-05-19 RX ORDER — MYCOPHENOLATE MOFETIL 200 MG/ML
1500 POWDER, FOR SUSPENSION ORAL
Status: DISCONTINUED | OUTPATIENT
Start: 2020-05-19 | End: 2020-05-22

## 2020-05-19 RX ORDER — PREDNISONE 20 MG/1
20 TABLET ORAL 2 TIMES DAILY WITH MEALS
Status: DISCONTINUED | OUTPATIENT
Start: 2020-05-28 | End: 2020-05-29 | Stop reason: HOSPADM

## 2020-05-19 RX ORDER — DEXTROSE MONOHYDRATE 25 G/50ML
25-50 INJECTION, SOLUTION INTRAVENOUS
Status: DISCONTINUED | OUTPATIENT
Start: 2020-05-19 | End: 2020-05-20

## 2020-05-19 RX ADMIN — HEPARIN SODIUM 5000 UNITS: 5000 INJECTION, SOLUTION INTRAVENOUS; SUBCUTANEOUS at 13:30

## 2020-05-19 RX ADMIN — SENNOSIDES AND DOCUSATE SODIUM 1 TABLET: 8.6; 5 TABLET ORAL at 11:58

## 2020-05-19 RX ADMIN — VANCOMYCIN HYDROCHLORIDE 1500 MG: 10 INJECTION, POWDER, LYOPHILIZED, FOR SOLUTION INTRAVENOUS at 14:18

## 2020-05-19 RX ADMIN — PIPERACILLIN AND TAZOBACTAM 3.38 G: 3; .375 INJECTION, POWDER, FOR SOLUTION INTRAVENOUS at 00:33

## 2020-05-19 RX ADMIN — HEPARIN SODIUM 5000 UNITS: 5000 INJECTION, SOLUTION INTRAVENOUS; SUBCUTANEOUS at 21:03

## 2020-05-19 RX ADMIN — HUMAN INSULIN 12 UNITS/HR: 100 INJECTION, SOLUTION SUBCUTANEOUS at 00:34

## 2020-05-19 RX ADMIN — MYCOPHENOLATE MOFETIL 1500 MG: 500 INJECTION, POWDER, LYOPHILIZED, FOR SOLUTION INTRAVENOUS at 05:31

## 2020-05-19 RX ADMIN — MYCOPHENOLATE MOFETIL 1500 MG: 200 POWDER, FOR SUSPENSION ORAL at 18:13

## 2020-05-19 RX ADMIN — PANTOPRAZOLE SODIUM 40 MG: 40 INJECTION, POWDER, FOR SOLUTION INTRAVENOUS at 09:36

## 2020-05-19 RX ADMIN — ACETAMINOPHEN 975 MG: 325 TABLET ORAL at 02:22

## 2020-05-19 RX ADMIN — ISOPROTERENOL HYDROCHLORIDE 0.02 MCG/KG/MIN: 0.2 INJECTION, SOLUTION INTRAMUSCULAR; INTRAVENOUS at 14:21

## 2020-05-19 RX ADMIN — OXYCODONE HYDROCHLORIDE 5 MG: 5 TABLET ORAL at 03:39

## 2020-05-19 RX ADMIN — ACETAMINOPHEN 975 MG: 325 TABLET ORAL at 11:57

## 2020-05-19 RX ADMIN — ISOPROTERENOL HYDROCHLORIDE 0.02 MCG/KG/MIN: 0.2 INJECTION, SOLUTION INTRAMUSCULAR; INTRAVENOUS at 23:05

## 2020-05-19 RX ADMIN — PIPERACILLIN AND TAZOBACTAM 2.25 G: 2; .25 INJECTION, POWDER, FOR SOLUTION INTRAVENOUS at 20:59

## 2020-05-19 RX ADMIN — PROPOFOL 50 MCG/KG/MIN: 10 INJECTION, EMULSION INTRAVENOUS at 04:22

## 2020-05-19 RX ADMIN — OXYCODONE HYDROCHLORIDE 5 MG: 5 TABLET ORAL at 21:02

## 2020-05-19 RX ADMIN — HUMAN INSULIN 12 UNITS/HR: 100 INJECTION, SOLUTION SUBCUTANEOUS at 03:20

## 2020-05-19 RX ADMIN — NYSTATIN 1000000 UNITS: 100000 SUSPENSION ORAL at 21:05

## 2020-05-19 RX ADMIN — METHYLPREDNISOLONE SODIUM SUCCINATE 125 MG: 125 INJECTION, POWDER, FOR SOLUTION INTRAMUSCULAR; INTRAVENOUS at 02:22

## 2020-05-19 RX ADMIN — HUMAN INSULIN 10 UNITS/HR: 100 INJECTION, SOLUTION SUBCUTANEOUS at 05:56

## 2020-05-19 RX ADMIN — ACETAMINOPHEN 975 MG: 325 TABLET ORAL at 18:11

## 2020-05-19 RX ADMIN — ISOPROTERENOL HYDROCHLORIDE 0.01 MCG/KG/MIN: 0.2 INJECTION, SOLUTION INTRAMUSCULAR; INTRAVENOUS at 06:22

## 2020-05-19 RX ADMIN — PIPERACILLIN AND TAZOBACTAM 2.25 G: 2; .25 INJECTION, POWDER, FOR SOLUTION INTRAVENOUS at 07:28

## 2020-05-19 RX ADMIN — NYSTATIN 1000000 UNITS: 100000 SUSPENSION ORAL at 07:29

## 2020-05-19 RX ADMIN — OXYCODONE HYDROCHLORIDE 10 MG: 5 TABLET ORAL at 11:57

## 2020-05-19 RX ADMIN — OXYCODONE HYDROCHLORIDE 10 MG: 5 TABLET ORAL at 07:28

## 2020-05-19 RX ADMIN — NYSTATIN 1000000 UNITS: 100000 SUSPENSION ORAL at 11:58

## 2020-05-19 RX ADMIN — SENNOSIDES AND DOCUSATE SODIUM 2 TABLET: 8.6; 5 TABLET ORAL at 21:02

## 2020-05-19 RX ADMIN — GABAPENTIN 100 MG: 100 CAPSULE ORAL at 21:02

## 2020-05-19 RX ADMIN — METHYLPREDNISOLONE SODIUM SUCCINATE 125 MG: 125 INJECTION, POWDER, FOR SOLUTION INTRAMUSCULAR; INTRAVENOUS at 20:53

## 2020-05-19 RX ADMIN — METHYLPREDNISOLONE SODIUM SUCCINATE 125 MG: 125 INJECTION, POWDER, FOR SOLUTION INTRAMUSCULAR; INTRAVENOUS at 10:13

## 2020-05-19 RX ADMIN — EPINEPHRINE 0.03 MCG/KG/MIN: 1 INJECTION PARENTERAL at 05:34

## 2020-05-19 RX ADMIN — PIPERACILLIN AND TAZOBACTAM 2.25 G: 2; .25 INJECTION, POWDER, FOR SOLUTION INTRAVENOUS at 13:30

## 2020-05-19 RX ADMIN — GABAPENTIN 100 MG: 100 CAPSULE ORAL at 13:31

## 2020-05-19 RX ADMIN — OXYCODONE HYDROCHLORIDE 5 MG: 5 TABLET ORAL at 16:23

## 2020-05-19 RX ADMIN — GABAPENTIN 100 MG: 100 CAPSULE ORAL at 07:28

## 2020-05-19 RX ADMIN — NYSTATIN 1000000 UNITS: 100000 SUSPENSION ORAL at 16:23

## 2020-05-19 RX ADMIN — PROPOFOL 50 MCG/KG/MIN: 10 INJECTION, EMULSION INTRAVENOUS at 00:33

## 2020-05-19 RX ADMIN — FUROSEMIDE 20 MG: 10 INJECTION, SOLUTION INTRAMUSCULAR; INTRAVENOUS at 10:13

## 2020-05-19 ASSESSMENT — ACTIVITIES OF DAILY LIVING (ADL)
ADLS_ACUITY_SCORE: 13
ADLS_ACUITY_SCORE: 12
ADLS_ACUITY_SCORE: 16
ADLS_ACUITY_SCORE: 12
ADLS_ACUITY_SCORE: 12
ADLS_ACUITY_SCORE: 13

## 2020-05-19 ASSESSMENT — PAIN DESCRIPTION - DESCRIPTORS
DESCRIPTORS: DISCOMFORT
DESCRIPTORS: DISCOMFORT

## 2020-05-19 NOTE — PLAN OF CARE
Major Shift Events:  patient admitted from OR s/p OHTx. Remains intubated, sedated on propofol gtt with prn pain meds. Opens eyes, MAEx4, follows commands on sedation vacation. Given 500 ml albumin for low CVP, hypotension. SVO2 58-69%, CI 2.8-3.8. Minimal CT output overnight. UOP   ml/hr. On epi, isuprel, insulin, and propofol gtts.   Plan: wean gtts as tolerated. Monitor for s/s cardiovascular deterioration, bleeding. Extubate when appropriate.  For vital signs and complete assessments, please see documentation flowsheets.

## 2020-05-19 NOTE — PROGRESS NOTES
Pt received Heart transplant on 05/18/2020, donor ID [id number], removed from [facility] transplant waitlist.

## 2020-05-19 NOTE — ANESTHESIA PROCEDURE NOTES
PA Catheter Insertion Note    Staff -   Anesthesiologist:  Bill Duckworth MD  Resident/Fellow: Chalo Harrell MD    Performed By: fellow  Procedure performed by resident/CRNA in presence of a teaching physician.          Introducer: Introducer placed as part of procedure (SEE separate note)   Skin prep:  Chloraprep Cap, Full body drape, hand hygiene, Mask, Sterile gloves and Sterile gown    PA Catheter type:  CCO    Distance catheter advanced:  42 cm.    Appropriate RA, RV, PA  waveforms?: Yes    Dressing:  Biopatch    Complications:  None apparent

## 2020-05-19 NOTE — PROGRESS NOTES
Final Crossmatch   Final crossmatch results for [facility] ID [id number] and recipient sample date 07/23/2018 and 05/18/2020  were both T cell and B cell, allo and auto negative.  DSA not noted.

## 2020-05-19 NOTE — PLAN OF CARE
OT 4E: Cancel- OT/CR orders received. Patient s/p OHT, working toward extubation today. Will reschedule OT evaluation.

## 2020-05-19 NOTE — PROGRESS NOTES
CV ICU Progress Note  May 19, 2020    Mariusz Sharp  [id number]  Admitted: 5/17/2020 11:24 PM      CO-MORBIDITIES:   Biventricular heart failure (H)  (primary encounter diagnosis)    ASSESSMENT: 59 year old male with significant PMHx of CAD, HTN, h/o MI s/p stent 2019 with ALYSSA with ICM with LVAD HeartMate III implant, ( EF 20-25%) taking blood thinners, chronic anemia, CKD, D2M on insulin s/p heart transplant, LVAD removal and ICD placement on 5/18/2020 with Dr. Willis, he was ectubated on POD#1 on 5/19/2020.     TODAY'S PROGRESS:   - Extubated after successful PST, wakes easy   - Dicontinue propofol gtt  - Start SQH  - Start MDSSI    Neurological:  #Sedation   - Monitor neurological status. Delirium preventions and precautions.   - Pain: Tylenol, PRN hydromorphone, Neurontin 100 TID, Melatonin  - HELD PTA pain regimen: PTA oxycodone 20 mg TID, gabapentin 600 mg TID, topical diclofenac PRN, flexeril PRN            - Sedation plan: n/a RASS Awake     Pulmonary:   # COVID negative on 5/18  # Extubated on 5/19/2020  - HOB elevation   - Supplemental oxygen to keep saturation above 92 %.      Cardiovascular:    # HTN  # CAD   #syncope with coughing  #RV dysfunction  - Monitor hemodynamic status.   - Epinephrine infusion, Isoproterenol infusion  - HELD  PTA meds Carvedilol, hydralazine, digoxin     Gastroenterology/Nutrition:  OG tube to suction  - Bowel regimen  - Regular diet, advance as tolerated  - MDSSI     Fluids/Electrolytes:   Electrolyte protocol in place     Renal:  #CKD  - monitor UOP, follow electrolyte lab        Endocrine:  #Stress/Steroid induced hyperglycemia  - MDSSI     ID:  # Immunosuppression per cardiology  # Infectious prophylaxis     Heme:     #Chronic Anemia     Musculoskeletal:  # No issues      Skin:  # No issues     General Cares/Prophylaxis:    DVT Prophylaxis: Pneumatic Compression Devices  GI Prophylaxis: PPI, SQH  Restraints: none     Lines/ tubes/ drains:  - ETT, PIV, Arterial line,  PAC, CVC-line     Disposition:  - CV ICU.   Patient seen, findings and plan discussed with CV ICU staff Dr. Shelby Montalvo MD CA-2, PGY-3  Anesthesiology Resident  CVICU Resident    ====================================    TODAY'S PROGRESS:   SUBJECTIVE:   - NAEO, patient doing well after extubation, he had a good cough, and is feeling well.        OBJECTIVE:   1. VITAL SIGNS:   Temp:  [96.3  F (35.7  C)-98.3  F (36.8  C)] 98.3  F (36.8  C)  Heart Rate:  [] 111  Resp:  [13-20] 20  MAP:  [61 mmHg-109 mmHg] 68 mmHg  Arterial Line BP: ()/(52-84) 92/56  FiO2 (%):  [40 %] 40 %  SpO2:  [98 %-100 %] 98 %  Ventilation Mode: (S) CPAP/PS  (Continuous positive airway pressure with Pressure Support)  FiO2 (%): 40 %  Rate Set (breaths/minute): 14 breaths/min  Tidal Volume Set (mL): 420 mL  PEEP (cm H2O): 5 cmH2O  Pressure Support (cm H2O): 5 cmH2O  Oxygen Concentration (%): 40 %  Resp: 20      2. INTAKE/ OUTPUT:   I/O last 3 completed shifts:  In: 3249.59 [I.V.:2439.59; Other:310]  Out: 2085 [Urine:1820; Chest Tube:265]    3. PHYSICAL EXAMINATION:   Neuro: Lying on bed, extubated and alert  Cardio/Resp: Equal chest rise, breath sounds equal  Regular heart rhythm, tachycardia present  Chest tubes with small volume bloody output  Drains:: Incision dressing clean and dry  GI: Abdomen soft, non-distended  MSK: Extremities normal, distal perfusion in tact  Skin: warm and dry, no rashes    4. INVESTIGATIONS:   Arterial Blood Gases   Recent Labs   Lab 05/19/20  0943 05/19/20  0812 05/19/20  0324 05/19/20  0009   PH 7.40 7.40 7.39 7.35   PCO2 27* 33* 38 39   PO2 131* 124* 132* 153*   HCO3 17* 21 23 21     Complete Blood Count   Recent Labs   Lab 05/19/20  0943 05/19/20  0324 05/18/20 2157 05/18/20  1835   WBC 16.8* 14.6* 13.0* 20.1*   HGB 8.0* 8.4* 8.8* 9.6*    177 164 192     Basic Metabolic Panel  Recent Labs   Lab 05/19/20  0943 05/19/20  0324 05/19/20  0009 05/18/20 2157 05/18/20  1835     140  --  141 141   POTASSIUM 4.4 4.2 4.0 3.9 3.7   CHLORIDE 112* 110*  --  112* 109   CO2 21 24  --  21 24   BUN 36* 38*  --  36* 39*   CR 2.40* 2.12*  --  1.85* 1.90*   * 164*  --  169* 115*     Liver Function Tests  Recent Labs   Lab 05/19/20  0943 05/18/20  1835 05/18/20  1630 05/18/20  1248 05/18/20  0857 05/18/20  0010   AST 98* 112*  --   --  25 30   ALT 26 30  --   --  32 33   ALKPHOS 27* 34*  --   --  54 51   BILITOTAL 1.0 1.5*  --   --  1.0 0.7   ALBUMIN 3.0* 2.8*  --   --  3.4 3.4   INR  --  1.54* 2.21* 1.42*  --  2.19*     Pancreatic Enzymes  Recent Labs   Lab 05/18/20  0857 05/18/20  0010   AMYLASE 56 65     Coagulation Profile  Recent Labs   Lab 05/18/20  1835 05/18/20  1630 05/18/20  1248 05/18/20  0010   INR 1.54* 2.21* 1.42* 2.19*   PTT 31 31 34 38*     Lactate  Invalid input(s): LACTATE    5. RADIOLOGY:   Recent Results (from the past 24 hour(s))   Cardiac Device Check - Inpatient   Result Value    Date Time Interrogation Session [id number]    Implantable Pulse Generator  [employer]    Implantable Pulse Generator Model D151 DYNAGEN    Implantable Pulse Generator Serial Number [id number]    Type Interrogation Session In Clinic    Clinic Name [facility]    Implantable Pulse Generator Type Defibrillator    Implantable Pulse Generator Implant Date 20180703    Implantable Lead  [employer]    Implantable Lead Model 7742 Ingevity MRI    Implantable Lead Serial Number [id number]    Implantable Lead Implant Date 20180703    Implantable Lead Polarity Type Bipolar Lead    Implantable Lead Location Detail 1 UNKNOWN    Implantable Lead Special Function IS-1 Bipolar    Implantable Lead Location Right Ventricle    Implantable Lead  [employer]    Implantable Lead Model 0180 [employer] SG    Implantable Lead Serial Number [id number]    Implantable Lead Implant Date 20180703    Implantable Lead Polarity Type Bipolar Lead    Implantable Lead  Location Detail 1 UNKNOWN    Implantable Lead Special Function Endo-Distal    Implantable Lead Location Right Ventricle    James Setting Mode (NBG Code) VVI    James Setting Lower Rate Limit 40    James Setting Hysterisis Rate Off    Lead Channel Setting Sensing Polarity Bipolar    Lead Channel Setting Sensing Sensitivity 0.6    Lead Channel Setting Sensing Adaptation Mode Adaptive    Lead Channel Setting Pacing Polarity Bipolar    Lead Channel Setting Pacing Pulse Width 1.0    Lead Channel Setting Pacing Amplitude 2.2    Lead Channel Setting Pacing Capture Mode Fixed Pacing    Zone Setting Type Category VF    Zone Setting Vendor Type Category VF    Zone Setting Detection Interval 300.0    Zone Setting Type Category VT    Zone Setting Vendor Type Category VT    Zone Setting Detection Interval 375.0    Zone Setting Type Category VT    Zone Setting Vendor Type Category VT-1    Lead Channel Impedance Value 969.0    Lead Channel Sensing Intrinsic Amplitude 19.8    Lead Channel Pacing Threshold Amplitude 0.6000    Lead Channel Pacing Threshold Pulse Width 1.0    Battery Date Time of Measurements 46861735431690    Battery Status Beginning of Service    Battery Remaining Longevity 144    Capacitor Charge Type Reformation    Capacitor Last Charge Date Time 19187464131559    Capacitor Charge Time 9.563    Capacitor Charge Type Shock    Capacitor Charge Time 8.395    Capacitor Charge Energy 41.0    James Statistic Date Time Start 34631623217436    James Statistic Date Time End 41109117800348    James Statistic RV Percent Paced 1    Therapy Statistic Total Shocks Delivered 5    Therapy Statistic Total Shocks Aborted 0    Therapy Statistic Total ATP Delivered 9    Therapy Statistic Total  Date Time End 55174910477095    Therapy Statistic Recent Shocks Delivered 0    Therapy Statistic Recent Shocks Aborted 0    Therapy Statistic Recent ATP Delivered 0    Therapy Statistic Recent Date Time Start 66021950597083    Therapy  Statistic Recent Date Time End 20200518000000    Episode Statistic Total Count 49    Episode Statistic Type Category VT    Episode Statistic Vendor Type Category NSVT    Episode Statistic Total Count 6    Episode Statistic Type Category VF    Episode Statistic Vendor Type Category VF    Episode Statistic Total Count 0    Episode Statistic Type Category VF_VT_MONITOR    Episode Statistic Total Date Time End 20200518000000    Episode Statistic Total Date Time End 20200518000000    Episode Statistic Total Date Time End 20200518000000    Episode Statistic Recent Count 0    Episode Statistic Type Category VT    Episode Statistic Vendor Type Category NSVT    Episode Statistic Recent Count 0    Episode Statistic Type Category VF    Episode Statistic Vendor Type Category VF    Episode Statistic Recent Count 0    Episode Statistic Type Category VF_VT_MONITOR    Episode Statistic Recent Date Time Start 20191204090301    Episode Statistic Recent Date Time End 20200518000000    Episode Statistic Recent Date Time Start 20191204090301    Episode Statistic Recent Date Time End 20200518000000    Episode Statistic Recent Date Time Start 20191204090301    Episode Statistic Recent Date Time End 20200518000000    Narrative    Patient seen on 3 C  for evaluation and iterative programming of a Crunched Single lead ICD per MD orders. Patient is scheduled for a heart transplant today. Normal ICD function. No episodes/arrhythmias recorded. Intrinsic rhythm = Regular VS @ 64 bpm.  = <1%. Estimated battery longevity to QUIQUE = 12 years. Lead trends appear stable. Patient reports that he is feeling well and denies complaints. Per Anesthesia orders Tachy therapies programmed OFF. LESTER Bryant RN, BSN.     Single lead ICD    I have reviewed and interpreted the device interrogation, settings, programming and nurse's summary. The device is functioning within normal device parameters. I agree with the current findings, assessment and plan.    XR Chest Port 1 View    Narrative    EXAMINATION:  XR CHEST PORT 1 VW 5/18/2020 6:39 PM.    COMPARISON: 5/18/2020 at 0008 hours.    HISTORY:  s/p heart txp    FINDINGS: Portable AP view of the chest. Postsurgical changes of heart  transplant. Endotracheal tube tip projects over the mid thoracic  trachea. Right IJ Cromwell-Calvin catheter tip projects over the pulmonary  trunk. Bibasilar chest tubes and 2 mediastinal drains are present.  Epicardial pacer wires are noted. Intact median sternotomy wires.  Retained catheter in the left upper chest at the site of prior  pacemaker.    Midline trachea. Cardiomediastinal silhouette is within normal limits.  Pulmonary vasculature is distinct. Interstitial prominence suggestive  of pulmonary vascular congestion. Trace bilateral pleural effusions.  No appreciable pneumothorax. Upper abdomen is unremarkable.      Impression    IMPRESSION:   1. Postsurgical changes of cardiac transplant with support devices in  appropriate position.  2. Mild pulmonary vascular congestion.  3. Retained catheter in the left upper chest at the site of prior  pacemaker.    I have personally reviewed the examination and initial interpretation  and I agree with the findings.    HIPOLITO DEL ANGEL MD   XR Abdomen Port 1 View    Narrative    XR ABDOMEN PORT 1 VW  5/18/2020 10:17 PM    History:  OG placement.     Comparison: CT abdominal/pelvis dated 5/12/2020    Findings:   50 degrees AP abdominal radiograph. Gastric tube with the tip and  side-port projects over stomach. Nonobstructive bowel gas pattern.  Gallstones projects over right upper quadrant.    Partially visualized Cromwell-Calvin catheter, bilateral chest tubes and  mediastinal drainage tube. Mild degenerative changes of thoracolumbar  spine.      Impression    IMPRESSION:  1. Gastric tube with the tip and side-port projects over stomach.  Nonobstructive bowel gas pattern.  2. Cholelithiasis.    I have personally reviewed the examination and  initial interpretation  and I agree with the findings.    JHOANA KHAN MD   XR Chest Port 1 View   Result Value    Radiologist flags Low Endotracheal tube tip (Urgent)    Narrative    EXAM: XR CHEST PORT 1 VW  5/19/2020 8:08 AM      HISTORY: sg catheter monitoring    COMPARISON: Chest x-ray from 5/18/2020 and 7/7/2019    FINDINGS: Semiupright portable AP radiograph of the chest.  Postsurgical changes of cardiac transplantation. The enteric tube  courses inferior to the diaphragm and out of the field of view. The  tip of the endotracheal tube not well visualized, but is likely low,  approximately 1 cm above the kris. Right IJ Viburnum-Calvin catheter with  tip projecting over the pulmonary trunk. Bilateral chest tubes and 3  mediastinal drains with stable positioning. Catheter again seen  projecting over the left axillary region.     Trachea is midline. The cardiac silhouette is unchanged. Mild  bilateral perihilar and basilar interstitial opacities. Small  bilateral pleural effusions. No pneumothorax. No acute osseous or  abdominal abnormality.      Impression    IMPRESSION:    1. The tip of the right IJ Viburnum-Calvin catheter projects over the  pulmonary trunk.  2. The tip of the endotracheal tube is not well visualized, but is  likely low, approximately 1 cm above the kris.  3. The remaining support devices are stable in positioning.  4. Mild bilateral perihilar and basilar interstitial opacities, likely  pulmonary edema versus atelectasis.  5. Postsurgical changes of cardiac transplantation.    Findings discussed with the senior radiology resident, Dr. Sahil Mirza.    [Urgent Result: Low Endotracheal tube tip]    Finding was identified on 5/19/2020 8:20 AM.     The ordering provider, Dr. Katerine Harper, was contacted by Dr. Tayler Oseguera at 5/19/2020 9:52 AM and verbalized understanding of the  urgent finding.     I have personally reviewed the examination and initial interpretation  and I agree with the  findings.    MAGGIE FONTENOT, MD       =========================================

## 2020-05-19 NOTE — PLAN OF CARE
PT 4E / Discharge Planner PT   Patient plan for discharge: not discussed today  Current status: PT evaluation completed. Patient alert, oriented and motivated for therapy. Supine>sit with mod A and HOB elevated, sit<>stand and pivots to chair with min Ax2, briefly hypotensive MAP mid-50s with position changes, but recovers to>65 within 1 minute, SpO2>93% on 1L>RA.   Barriers to return to prior living situation: medical needs, post-op precautions, current functional status  Recommendations for discharge: Pending progress in therapy, anticipate discharge to local apartment with assist + OP CR   Rationale for recommendations: Patient is currently below baseline for mobility but mobilizing well post-operatively, previously independent and very active, anticipate will progress mobility quickly and be appropriate to discharge to local apartment, will monitor progress.       Entered by: Neil Pulido 05/19/2020 2:32 PM

## 2020-05-19 NOTE — PROGRESS NOTES
CVTS Progress Note  May 19, 2020    Mariusz Sharp  [id number]  Admitted: 5/17/2020 11:24 PM      CO-MORBIDITIES:   Biventricular heart failure (H)  (primary encounter diagnosis)    ASSESSMENT: 59 year old male with significant PMHx of CAD, HTN, h/o MI s/p stent 2019 with ALYSSA with ICM with LVAD HeartMate III implant, ( EF 20-25%) taking blood thinners, chronic anemia, CKD, D2M on insulin s/p heart transplant, LVAD removal and ICD placement on 5/18/2020 with Dr. Willis, he was ectubated on POD#1 on 5/19/2020.     TODAY'S PROGRESS:   - Extubated after successful PST, wakes easy   - Dicontinue propofol gtt  - Start SQH  - Start MDSSI    Neurological:  #Sedation   - Monitor neurological status. Delirium preventions and precautions.   - Pain: Tylenol, PRN hydromorphone, Neurontin 100 TID, Melatonin  - HELD PTA pain regimen: PTA oxycodone 20 mg TID, gabapentin 600 mg TID, topical diclofenac PRN, flexeril PRN            - Sedation plan: n/a RASS Awake     Pulmonary:   # COVID negative on 5/18  # Extubated on 5/19/2020  - HOB elevation   - Supplemental oxygen to keep saturation above 92 %.      Cardiovascular:    # HTN  # CAD   #syncope with coughing  #RV dysfunction  - Monitor hemodynamic status.   - Epinephrine infusion, Isoproterenol infusion  - HELD  PTA meds Carvedilol, hydralazine, digoxin     Gastroenterology/Nutrition:  OG tube to suction  - Bowel regimen  - Regular diet, advance as tolerated  - MDSSI     Fluids/Electrolytes:   Electrolyte protocol in place     Renal:  #CKD  - monitor UOP, follow electrolyte lab        Endocrine:  #Stress/Steroid induced hyperglycemia  - MDSSI     ID:  # Immunosuppression per cardiology  # Infectious prophylaxis     Heme:     #Chronic Anemia     Musculoskeletal:  # No issues      Skin:  # No issues     General Cares/Prophylaxis:    DVT Prophylaxis: Pneumatic Compression Devices  GI Prophylaxis: PPI, SQH  Restraints: none     Lines/ tubes/ drains:  - ETT, PIV, Arterial line,  PAC, CVC-line     Disposition:  - CV ICU.   Patient seen, findings and plan discussed with CVTS Fellow    Milad Montalvo MD CA-2, PGY-3  Anesthesiology Resident  CVICU Resident    ====================================    TODAY'S PROGRESS:   SUBJECTIVE:   - NAEO, patient doing well after extubation, he had a good cough, and is feeling well.        OBJECTIVE:   1. VITAL SIGNS:   Temp:  [96.3  F (35.7  C)-98.3  F (36.8  C)] 98.3  F (36.8  C)  Heart Rate:  [] 111  Resp:  [13-20] 20  MAP:  [61 mmHg-109 mmHg] 68 mmHg  Arterial Line BP: ()/(52-84) 92/56  FiO2 (%):  [40 %] 40 %  SpO2:  [98 %-100 %] 98 %  Ventilation Mode: (S) CPAP/PS  (Continuous positive airway pressure with Pressure Support)  FiO2 (%): 40 %  Rate Set (breaths/minute): 14 breaths/min  Tidal Volume Set (mL): 420 mL  PEEP (cm H2O): 5 cmH2O  Pressure Support (cm H2O): 5 cmH2O  Oxygen Concentration (%): 40 %  Resp: 20      2. INTAKE/ OUTPUT:   I/O last 3 completed shifts:  In: 3249.59 [I.V.:2439.59; Other:310]  Out: 2085 [Urine:1820; Chest Tube:265]    3. PHYSICAL EXAMINATION:   Neuro: Lying on bed, extubated and alert  Cardio/Resp: Equal chest rise, breath sounds equal  Regular heart rhythm, tachycardia present  Chest tubes with small volume bloody output  Drains:: Incision dressing clean and dry  GI: Abdomen soft, non-distended  MSK: Extremities normal, distal perfusion in tact  Skin: warm and dry, no rashes    4. INVESTIGATIONS:   Arterial Blood Gases   Recent Labs   Lab 05/19/20  0943 05/19/20  0812 05/19/20  0324 05/19/20  0009   PH 7.40 7.40 7.39 7.35   PCO2 27* 33* 38 39   PO2 131* 124* 132* 153*   HCO3 17* 21 23 21     Complete Blood Count   Recent Labs   Lab 05/19/20  0943 05/19/20  0324 05/18/20 2157 05/18/20  1835   WBC 16.8* 14.6* 13.0* 20.1*   HGB 8.0* 8.4* 8.8* 9.6*    177 164 192     Basic Metabolic Panel  Recent Labs   Lab 05/19/20  0943 05/19/20  0324 05/19/20  0009 05/18/20 2157 05/18/20  1835    140  --  141 141    POTASSIUM 4.4 4.2 4.0 3.9 3.7   CHLORIDE 112* 110*  --  112* 109   CO2 21 24  --  21 24   BUN 36* 38*  --  36* 39*   CR 2.40* 2.12*  --  1.85* 1.90*   * 164*  --  169* 115*     Liver Function Tests  Recent Labs   Lab 05/19/20  0943 05/18/20  1835 05/18/20  1630 05/18/20  1248 05/18/20  0857 05/18/20  0010   AST 98* 112*  --   --  25 30   ALT 26 30  --   --  32 33   ALKPHOS 27* 34*  --   --  54 51   BILITOTAL 1.0 1.5*  --   --  1.0 0.7   ALBUMIN 3.0* 2.8*  --   --  3.4 3.4   INR  --  1.54* 2.21* 1.42*  --  2.19*     Pancreatic Enzymes  Recent Labs   Lab 05/18/20  0857 05/18/20  0010   AMYLASE 56 65     Coagulation Profile  Recent Labs   Lab 05/18/20  1835 05/18/20  1630 05/18/20  1248 05/18/20  0010   INR 1.54* 2.21* 1.42* 2.19*   PTT 31 31 34 38*     Lactate  Invalid input(s): LACTATE    5. RADIOLOGY:   Recent Results (from the past 24 hour(s))   Cardiac Device Check - Inpatient   Result Value    Date Time Interrogation Session [id number]    Implantable Pulse Generator  [employer]    Implantable Pulse Generator Model D151 DYNAGEN    Implantable Pulse Generator Serial Number [id number]    Type Interrogation Session In Clinic    Clinic Name [facility]    Implantable Pulse Generator Type Defibrillator    Implantable Pulse Generator Implant Date 20180703    Implantable Lead  [employer]    Implantable Lead Model 7742 Ingevity MRI    Implantable Lead Serial Number [id number]    Implantable Lead Implant Date 20180703    Implantable Lead Polarity Type Bipolar Lead    Implantable Lead Location Detail 1 UNKNOWN    Implantable Lead Special Function IS-1 Bipolar    Implantable Lead Location Right Ventricle    Implantable Lead  Guidant    Implantable Lead Model 0180 [place] SG    Implantable Lead Serial Number [id number]    Implantable Lead Implant Date 20180703    Implantable Lead Polarity Type Bipolar Lead    Implantable Lead Location Detail 1  UNKNOWN    Implantable Lead Special Function Endo-Distal    Implantable Lead Location Right Ventricle    James Setting Mode (NBG Code) VVI    James Setting Lower Rate Limit 40    James Setting Hysterisis Rate Off    Lead Channel Setting Sensing Polarity Bipolar    Lead Channel Setting Sensing Sensitivity 0.6    Lead Channel Setting Sensing Adaptation Mode Adaptive    Lead Channel Setting Pacing Polarity Bipolar    Lead Channel Setting Pacing Pulse Width 1.0    Lead Channel Setting Pacing Amplitude 2.2    Lead Channel Setting Pacing Capture Mode Fixed Pacing    Zone Setting Type Category VF    Zone Setting Vendor Type Category VF    Zone Setting Detection Interval 300.0    Zone Setting Type Category VT    Zone Setting Vendor Type Category VT    Zone Setting Detection Interval 375.0    Zone Setting Type Category VT    Zone Setting Vendor Type Category VT-1    Lead Channel Impedance Value 969.0    Lead Channel Sensing Intrinsic Amplitude 19.8    Lead Channel Pacing Threshold Amplitude 0.6000    Lead Channel Pacing Threshold Pulse Width 1.0    Battery Date Time of Measurements 14039052007055    Battery Status Beginning of Service    Battery Remaining Longevity 144    Capacitor Charge Type Reformation    Capacitor Last Charge Date Time 79234749624018    Capacitor Charge Time 9.563    Capacitor Charge Type Shock    Capacitor Charge Time 8.395    Capacitor Charge Energy 41.0    James Statistic Date Time Start 84550770734661    Jmaes Statistic Date Time End 48875268572965    James Statistic RV Percent Paced 1    Therapy Statistic Total Shocks Delivered 5    Therapy Statistic Total Shocks Aborted 0    Therapy Statistic Total ATP Delivered 9    Therapy Statistic Total  Date Time End 28496719861747    Therapy Statistic Recent Shocks Delivered 0    Therapy Statistic Recent Shocks Aborted 0    Therapy Statistic Recent ATP Delivered 0    Therapy Statistic Recent Date Time Start 11539854129859    Therapy Statistic Recent Date Time  End 20200518000000    Episode Statistic Total Count 49    Episode Statistic Type Category VT    Episode Statistic Vendor Type Category NSVT    Episode Statistic Total Count 6    Episode Statistic Type Category VF    Episode Statistic Vendor Type Category VF    Episode Statistic Total Count 0    Episode Statistic Type Category VF_VT_MONITOR    Episode Statistic Total Date Time End 20200518000000    Episode Statistic Total Date Time End 20200518000000    Episode Statistic Total Date Time End 20200518000000    Episode Statistic Recent Count 0    Episode Statistic Type Category VT    Episode Statistic Vendor Type Category NSVT    Episode Statistic Recent Count 0    Episode Statistic Type Category VF    Episode Statistic Vendor Type Category VF    Episode Statistic Recent Count 0    Episode Statistic Type Category VF_VT_MONITOR    Episode Statistic Recent Date Time Start 20191204090301    Episode Statistic Recent Date Time End 20200518000000    Episode Statistic Recent Date Time Start 20191204090301    Episode Statistic Recent Date Time End 20200518000000    Episode Statistic Recent Date Time Start 20191204090301    Episode Statistic Recent Date Time End 20200518000000    Narrative    Patient seen on 3 C  for evaluation and iterative programming of a Jamn Single lead ICD per MD orders. Patient is scheduled for a heart transplant today. Normal ICD function. No episodes/arrhythmias recorded. Intrinsic rhythm = Regular VS @ 64 bpm.  = <1%. Estimated battery longevity to QUIQUE = 12 years. Lead trends appear stable. Patient reports that he is feeling well and denies complaints. Per Anesthesia orders Tachy therapies programmed OFF. LESTER Bryant RN, BSN.     Single lead ICD    I have reviewed and interpreted the device interrogation, settings, programming and nurse's summary. The device is functioning within normal device parameters. I agree with the current findings, assessment and plan.   XR Chest Port 1 View     Narrative    EXAMINATION:  XR CHEST PORT 1 VW 5/18/2020 6:39 PM.    COMPARISON: 5/18/2020 at 0008 hours.    HISTORY:  s/p heart txp    FINDINGS: Portable AP view of the chest. Postsurgical changes of heart  transplant. Endotracheal tube tip projects over the mid thoracic  trachea. Right IJ [place]-Calvin catheter tip projects over the pulmonary  trunk. Bibasilar chest tubes and 2 mediastinal drains are present.  Epicardial pacer wires are noted. Intact median sternotomy wires.  Retained catheter in the left upper chest at the site of prior  pacemaker.    Midline trachea. Cardiomediastinal silhouette is within normal limits.  Pulmonary vasculature is distinct. Interstitial prominence suggestive  of pulmonary vascular congestion. Trace bilateral pleural effusions.  No appreciable pneumothorax. Upper abdomen is unremarkable.      Impression    IMPRESSION:   1. Postsurgical changes of cardiac transplant with support devices in  appropriate position.  2. Mild pulmonary vascular congestion.  3. Retained catheter in the left upper chest at the site of prior  pacemaker.    I have personally reviewed the examination and initial interpretation  and I agree with the findings.    HIPOILTO DEL ANGEL MD   XR Abdomen Port 1 View    Narrative    XR ABDOMEN PORT 1 VW  5/18/2020 10:17 PM    History:  OG placement.     Comparison: CT abdominal/pelvis dated 5/12/2020    Findings:   50 degrees AP abdominal radiograph. Gastric tube with the tip and  side-port projects over stomach. Nonobstructive bowel gas pattern.  Gallstones projects over right upper quadrant.    Partially visualized [place]-Calvin catheter, bilateral chest tubes and  mediastinal drainage tube. Mild degenerative changes of thoracolumbar  spine.      Impression    IMPRESSION:  1. Gastric tube with the tip and side-port projects over stomach.  Nonobstructive bowel gas pattern.  2. Cholelithiasis.    I have personally reviewed the examination and initial interpretation  and I  agree with the findings.    JHOANA KHAN MD   XR Chest Port 1 View   Result Value    Radiologist flags Low Endotracheal tube tip (Urgent)    Narrative    EXAM: XR CHEST PORT 1 VW  5/19/2020 8:08 AM      HISTORY: sg catheter monitoring    COMPARISON: Chest x-ray from 5/18/2020 and 7/7/2019    FINDINGS: Semiupright portable AP radiograph of the chest.  Postsurgical changes of cardiac transplantation. The enteric tube  courses inferior to the diaphragm and out of the field of view. The  tip of the endotracheal tube not well visualized, but is likely low,  approximately 1 cm above the kris. Right IJ [place]-Calvin catheter with  tip projecting over the pulmonary trunk. Bilateral chest tubes and 3  mediastinal drains with stable positioning. Catheter again seen  projecting over the left axillary region.     Trachea is midline. The cardiac silhouette is unchanged. Mild  bilateral perihilar and basilar interstitial opacities. Small  bilateral pleural effusions. No pneumothorax. No acute osseous or  abdominal abnormality.      Impression    IMPRESSION:    1. The tip of the right IJ [place]-Calvin catheter projects over the  pulmonary trunk.  2. The tip of the endotracheal tube is not well visualized, but is  likely low, approximately 1 cm above the kris.  3. The remaining support devices are stable in positioning.  4. Mild bilateral perihilar and basilar interstitial opacities, likely  pulmonary edema versus atelectasis.  5. Postsurgical changes of cardiac transplantation.    Findings discussed with the senior radiology resident, Dr. Sahil Mirza.    [Urgent Result: Low Endotracheal tube tip]    Finding was identified on 5/19/2020 8:20 AM.     The ordering provider, Dr. Katerine Harper, was contacted by Dr. Tayler Oseguera at 5/19/2020 9:52 AM and verbalized understanding of the  urgent finding.     I have personally reviewed the examination and initial interpretation  and I agree with the findings.    MAGGIE FONTENOT MD        =========================================

## 2020-05-19 NOTE — PROGRESS NOTES
Patient extubated on 5/19/2020 at 1000    Extubated to 2 LPM NC    Strong cough, verbal    No complications    Essence WALLER

## 2020-05-19 NOTE — ANESTHESIA POSTPROCEDURE EVALUATION
Anesthesia POST Procedure Evaluation    Patient: Mariusz Sharp   MRN:     [id number] Gender:   male   Age:    59 year old :      1960        Preoperative Diagnosis: Ischemic dilated cardiomyopathy (H) [I25.5, I42.0]   Procedure(s):  REDO MEDIAN STERNOTOMY, LYSIS OF ADHESIONS, ON CARDIOPULMONARY BYPASS PUMP, HEART TRANSPLANT RECIPIENT, REMOVAL OF LEFT VENTRICULAR ASSIST DEVICE, REMOVAL OF AUTOMATED IMPLANTABLE CARDIOVERTER DEFIBRILLATOR   Postop Comments: No value filed.     Anesthesia Type: General       Disposition: ICU            ICU Sign Out: Anesthesiologist/ICU physician sign out WAS performed   Postop Pain Control:    PONV:    Neuro/Psych: Uneventful            Sign Out: PLANNED postop sedation   Airway/Respiratory: Uneventful            Sign Out: AIRWAY IN SITU/Resp. Support               Airway in situ/Resp. Support: ETT                 Reason: Planned Pre-op   CV/Hemodynamics: Uneventful            Sign Out: Acceptable CV status   Other NRE: NONE   DID A NON-ROUTINE EVENT OCCUR? No         Last Anesthesia Record Vitals:  CRNA VITALS  2020 1732 - 2020 1832      2020             Pulse:  113    ART BP:  130/74    ART Mean:  92    Ht Rate:  113          Last PACU Vitals:  Vitals Value Taken Time   BP     Temp     Pulse     Resp     SpO2 100 % 2020  7:08 PM   Temp src     NIBP     Pulse     SpO2     Resp     Temp     Ht Rate     Temp 2     Vitals shown include unvalidated device data.      Electronically Signed By: Bill Duckworth MD, May 18, 2020, 7:10 PM

## 2020-05-19 NOTE — PROVIDER NOTIFICATION
Notified CVTS that SVO2 dropped with epinephrine off and MAP is borderline hypotensive. Will restart epi

## 2020-05-20 ENCOUNTER — APPOINTMENT (OUTPATIENT)
Dept: GENERAL RADIOLOGY | Facility: CLINIC | Age: 60
DRG: 001 | End: 2020-05-20
Attending: STUDENT IN AN ORGANIZED HEALTH CARE EDUCATION/TRAINING PROGRAM
Payer: COMMERCIAL

## 2020-05-20 ENCOUNTER — APPOINTMENT (OUTPATIENT)
Dept: OCCUPATIONAL THERAPY | Facility: CLINIC | Age: 60
DRG: 001 | End: 2020-05-20
Attending: THORACIC SURGERY (CARDIOTHORACIC VASCULAR SURGERY)
Payer: COMMERCIAL

## 2020-05-20 LAB
ANION GAP SERPL CALCULATED.3IONS-SCNC: 12 MMOL/L (ref 3–14)
ANION GAP SERPL CALCULATED.3IONS-SCNC: 7 MMOL/L (ref 3–14)
ANION GAP SERPL CALCULATED.3IONS-SCNC: 9 MMOL/L (ref 3–14)
ANION GAP SERPL CALCULATED.3IONS-SCNC: 9 MMOL/L (ref 3–14)
BASE DEFICIT BLDV-SCNC: 0.6 MMOL/L
BASE DEFICIT BLDV-SCNC: 3 MMOL/L
BASE DEFICIT BLDV-SCNC: 3.4 MMOL/L
BASE DEFICIT BLDV-SCNC: 4.2 MMOL/L
BASE EXCESS BLDV CALC-SCNC: 0.4 MMOL/L
BASE EXCESS BLDV CALC-SCNC: 0.4 MMOL/L
BLD PROD TYP BPU: NORMAL
BLD PROD TYP BPU: NORMAL
BLD UNIT ID BPU: 0
BLD UNIT ID BPU: 0
BLOOD PRODUCT CODE: NORMAL
BLOOD PRODUCT CODE: NORMAL
BPU ID: NORMAL
BPU ID: NORMAL
BUN SERPL-MCNC: 48 MG/DL (ref 7–30)
BUN SERPL-MCNC: 50 MG/DL (ref 7–30)
CALCIUM SERPL-MCNC: 7.7 MG/DL (ref 8.5–10.1)
CALCIUM SERPL-MCNC: 7.7 MG/DL (ref 8.5–10.1)
CALCIUM SERPL-MCNC: 7.9 MG/DL (ref 8.5–10.1)
CALCIUM SERPL-MCNC: 8 MG/DL (ref 8.5–10.1)
CHLORIDE SERPL-SCNC: 102 MMOL/L (ref 94–109)
CHLORIDE SERPL-SCNC: 103 MMOL/L (ref 94–109)
CHLORIDE SERPL-SCNC: 104 MMOL/L (ref 94–109)
CHLORIDE SERPL-SCNC: 104 MMOL/L (ref 94–109)
CO2 SERPL-SCNC: 22 MMOL/L (ref 20–32)
CO2 SERPL-SCNC: 25 MMOL/L (ref 20–32)
CREAT SERPL-MCNC: 2.42 MG/DL (ref 0.66–1.25)
CREAT SERPL-MCNC: 2.44 MG/DL (ref 0.66–1.25)
CREAT SERPL-MCNC: 2.58 MG/DL (ref 0.66–1.25)
CREAT SERPL-MCNC: 2.81 MG/DL (ref 0.66–1.25)
ERYTHROCYTE [DISTWIDTH] IN BLOOD BY AUTOMATED COUNT: 16.4 % (ref 10–15)
ERYTHROCYTE [DISTWIDTH] IN BLOOD BY AUTOMATED COUNT: 16.4 % (ref 10–15)
ERYTHROCYTE [DISTWIDTH] IN BLOOD BY AUTOMATED COUNT: 16.5 % (ref 10–15)
ERYTHROCYTE [DISTWIDTH] IN BLOOD BY AUTOMATED COUNT: 16.5 % (ref 10–15)
GFR SERPL CREATININE-BSD FRML MDRD: 24 ML/MIN/{1.73_M2}
GFR SERPL CREATININE-BSD FRML MDRD: 26 ML/MIN/{1.73_M2}
GFR SERPL CREATININE-BSD FRML MDRD: 28 ML/MIN/{1.73_M2}
GFR SERPL CREATININE-BSD FRML MDRD: 28 ML/MIN/{1.73_M2}
GLUCOSE BLDC GLUCOMTR-MCNC: 216 MG/DL (ref 70–99)
GLUCOSE BLDC GLUCOMTR-MCNC: 268 MG/DL (ref 70–99)
GLUCOSE BLDC GLUCOMTR-MCNC: 275 MG/DL (ref 70–99)
GLUCOSE BLDC GLUCOMTR-MCNC: 286 MG/DL (ref 70–99)
GLUCOSE BLDC GLUCOMTR-MCNC: 336 MG/DL (ref 70–99)
GLUCOSE SERPL-MCNC: 244 MG/DL (ref 70–99)
GLUCOSE SERPL-MCNC: 266 MG/DL (ref 70–99)
GLUCOSE SERPL-MCNC: 318 MG/DL (ref 70–99)
GLUCOSE SERPL-MCNC: 336 MG/DL (ref 70–99)
HCO3 BLDV-SCNC: 21 MMOL/L (ref 21–28)
HCO3 BLDV-SCNC: 22 MMOL/L (ref 21–28)
HCO3 BLDV-SCNC: 23 MMOL/L (ref 21–28)
HCO3 BLDV-SCNC: 25 MMOL/L (ref 21–28)
HCO3 BLDV-SCNC: 26 MMOL/L (ref 21–28)
HCO3 BLDV-SCNC: 26 MMOL/L (ref 21–28)
HCT VFR BLD AUTO: 23.9 % (ref 40–53)
HCT VFR BLD AUTO: 24.5 % (ref 40–53)
HCT VFR BLD AUTO: 24.5 % (ref 40–53)
HCT VFR BLD AUTO: 24.7 % (ref 40–53)
HGB BLD-MCNC: 7.4 G/DL (ref 13.3–17.7)
HGB BLD-MCNC: 7.5 G/DL (ref 13.3–17.7)
HGB BLD-MCNC: 7.6 G/DL (ref 13.3–17.7)
HGB BLD-MCNC: 7.8 G/DL (ref 13.3–17.7)
MCH RBC QN AUTO: 25.4 PG (ref 26.5–33)
MCH RBC QN AUTO: 25.9 PG (ref 26.5–33)
MCH RBC QN AUTO: 26.1 PG (ref 26.5–33)
MCH RBC QN AUTO: 26.1 PG (ref 26.5–33)
MCHC RBC AUTO-ENTMCNC: 30.6 G/DL (ref 31.5–36.5)
MCHC RBC AUTO-ENTMCNC: 31 G/DL (ref 31.5–36.5)
MCHC RBC AUTO-ENTMCNC: 31 G/DL (ref 31.5–36.5)
MCHC RBC AUTO-ENTMCNC: 31.6 G/DL (ref 31.5–36.5)
MCV RBC AUTO: 83 FL (ref 78–100)
MCV RBC AUTO: 83 FL (ref 78–100)
MCV RBC AUTO: 84 FL (ref 78–100)
MCV RBC AUTO: 84 FL (ref 78–100)
O2/TOTAL GAS SETTING VFR VENT: 21 %
O2/TOTAL GAS SETTING VFR VENT: 21 %
O2/TOTAL GAS SETTING VFR VENT: NORMAL %
OXYHGB MFR BLDV: 47 %
OXYHGB MFR BLDV: 52 %
OXYHGB MFR BLDV: 56 %
OXYHGB MFR BLDV: 59 %
OXYHGB MFR BLDV: 60 %
OXYHGB MFR BLDV: 64 %
PCO2 BLDV: 40 MM HG (ref 40–50)
PCO2 BLDV: 42 MM HG (ref 40–50)
PCO2 BLDV: 42 MM HG (ref 40–50)
PCO2 BLDV: 43 MM HG (ref 40–50)
PH BLDV: 7.33 PH (ref 7.32–7.43)
PH BLDV: 7.34 PH (ref 7.32–7.43)
PH BLDV: 7.34 PH (ref 7.32–7.43)
PH BLDV: 7.37 PH (ref 7.32–7.43)
PH BLDV: 7.38 PH (ref 7.32–7.43)
PH BLDV: 7.39 PH (ref 7.32–7.43)
PLATELET # BLD AUTO: 149 10E9/L (ref 150–450)
PLATELET # BLD AUTO: 151 10E9/L (ref 150–450)
PLATELET # BLD AUTO: 154 10E9/L (ref 150–450)
PLATELET # BLD AUTO: 162 10E9/L (ref 150–450)
PO2 BLDV: 29 MM HG (ref 25–47)
PO2 BLDV: 32 MM HG (ref 25–47)
PO2 BLDV: 33 MM HG (ref 25–47)
PO2 BLDV: 35 MM HG (ref 25–47)
PO2 BLDV: 36 MM HG (ref 25–47)
PO2 BLDV: 38 MM HG (ref 25–47)
POTASSIUM SERPL-SCNC: 3.8 MMOL/L (ref 3.4–5.3)
POTASSIUM SERPL-SCNC: 4 MMOL/L (ref 3.4–5.3)
POTASSIUM SERPL-SCNC: 4.4 MMOL/L (ref 3.4–5.3)
POTASSIUM SERPL-SCNC: 4.4 MMOL/L (ref 3.4–5.3)
RBC # BLD AUTO: 2.86 10E12/L (ref 4.4–5.9)
RBC # BLD AUTO: 2.91 10E12/L (ref 4.4–5.9)
RBC # BLD AUTO: 2.95 10E12/L (ref 4.4–5.9)
RBC # BLD AUTO: 2.99 10E12/L (ref 4.4–5.9)
SODIUM SERPL-SCNC: 135 MMOL/L (ref 133–144)
SODIUM SERPL-SCNC: 136 MMOL/L (ref 133–144)
TRANSFUSION STATUS PATIENT QL: NORMAL
VANCOMYCIN SERPL-MCNC: 15.3 MG/L
WBC # BLD AUTO: 10.6 10E9/L (ref 4–11)
WBC # BLD AUTO: 10.7 10E9/L (ref 4–11)
WBC # BLD AUTO: 11.2 10E9/L (ref 4–11)
WBC # BLD AUTO: 15.8 10E9/L (ref 4–11)

## 2020-05-20 PROCEDURE — 25000131 ZZH RX MED GY IP 250 OP 636 PS 637: Performed by: THORACIC SURGERY (CARDIOTHORACIC VASCULAR SURGERY)

## 2020-05-20 PROCEDURE — 25000132 ZZH RX MED GY IP 250 OP 250 PS 637: Performed by: SURGERY

## 2020-05-20 PROCEDURE — 25800030 ZZH RX IP 258 OP 636: Performed by: SURGERY

## 2020-05-20 PROCEDURE — 99233 SBSQ HOSP IP/OBS HIGH 50: CPT | Mod: GC | Performed by: INTERNAL MEDICINE

## 2020-05-20 PROCEDURE — 25000128 H RX IP 250 OP 636: Performed by: SURGERY

## 2020-05-20 PROCEDURE — 82805 BLOOD GASES W/O2 SATURATION: CPT | Performed by: SURGERY

## 2020-05-20 PROCEDURE — 25800030 ZZH RX IP 258 OP 636: Performed by: THORACIC SURGERY (CARDIOTHORACIC VASCULAR SURGERY)

## 2020-05-20 PROCEDURE — 40000014 ZZH STATISTIC ARTERIAL MONITORING DAILY

## 2020-05-20 PROCEDURE — 25000128 H RX IP 250 OP 636: Performed by: THORACIC SURGERY (CARDIOTHORACIC VASCULAR SURGERY)

## 2020-05-20 PROCEDURE — 97165 OT EVAL LOW COMPLEX 30 MIN: CPT | Mod: GO

## 2020-05-20 PROCEDURE — 25000132 ZZH RX MED GY IP 250 OP 250 PS 637: Performed by: STUDENT IN AN ORGANIZED HEALTH CARE EDUCATION/TRAINING PROGRAM

## 2020-05-20 PROCEDURE — 71045 X-RAY EXAM CHEST 1 VIEW: CPT

## 2020-05-20 PROCEDURE — 25000128 H RX IP 250 OP 636: Performed by: STUDENT IN AN ORGANIZED HEALTH CARE EDUCATION/TRAINING PROGRAM

## 2020-05-20 PROCEDURE — 80048 BASIC METABOLIC PNL TOTAL CA: CPT | Performed by: SURGERY

## 2020-05-20 PROCEDURE — 25000131 ZZH RX MED GY IP 250 OP 636 PS 637: Performed by: STUDENT IN AN ORGANIZED HEALTH CARE EDUCATION/TRAINING PROGRAM

## 2020-05-20 PROCEDURE — 20000004 ZZH R&B ICU UMMC

## 2020-05-20 PROCEDURE — 85027 COMPLETE CBC AUTOMATED: CPT | Performed by: SURGERY

## 2020-05-20 PROCEDURE — 40000275 ZZH STATISTIC RCP TIME EA 10 MIN

## 2020-05-20 PROCEDURE — 97535 SELF CARE MNGMENT TRAINING: CPT | Mod: GO

## 2020-05-20 PROCEDURE — 40000048 ZZH STATISTIC DAILY SWAN MONITORING

## 2020-05-20 PROCEDURE — 00000146 ZZHCL STATISTIC GLUCOSE BY METER IP

## 2020-05-20 PROCEDURE — C9113 INJ PANTOPRAZOLE SODIUM, VIA: HCPCS | Performed by: SURGERY

## 2020-05-20 PROCEDURE — 25000125 ZZHC RX 250: Performed by: SURGERY

## 2020-05-20 PROCEDURE — 80202 ASSAY OF VANCOMYCIN: CPT | Performed by: THORACIC SURGERY (CARDIOTHORACIC VASCULAR SURGERY)

## 2020-05-20 PROCEDURE — 40000196 ZZH STATISTIC RAPCV CVP MONITORING

## 2020-05-20 RX ORDER — DEXTROSE MONOHYDRATE 25 G/50ML
25-50 INJECTION, SOLUTION INTRAVENOUS
Status: DISCONTINUED | OUTPATIENT
Start: 2020-05-20 | End: 2020-05-21

## 2020-05-20 RX ORDER — TERBUTALINE SULFATE 5 MG/1
10 TABLET ORAL EVERY 8 HOURS SCHEDULED
Status: DISCONTINUED | OUTPATIENT
Start: 2020-05-20 | End: 2020-05-23

## 2020-05-20 RX ORDER — FUROSEMIDE 10 MG/ML
20 INJECTION INTRAMUSCULAR; INTRAVENOUS ONCE
Status: COMPLETED | OUTPATIENT
Start: 2020-05-20 | End: 2020-05-20

## 2020-05-20 RX ORDER — NICOTINE POLACRILEX 4 MG
15-30 LOZENGE BUCCAL
Status: DISCONTINUED | OUTPATIENT
Start: 2020-05-20 | End: 2020-05-21

## 2020-05-20 RX ORDER — PANTOPRAZOLE SODIUM 40 MG/1
40 TABLET, DELAYED RELEASE ORAL
Status: DISCONTINUED | OUTPATIENT
Start: 2020-05-21 | End: 2020-05-29 | Stop reason: HOSPADM

## 2020-05-20 RX ADMIN — GABAPENTIN 100 MG: 100 CAPSULE ORAL at 13:37

## 2020-05-20 RX ADMIN — VANCOMYCIN HYDROCHLORIDE 1500 MG: 10 INJECTION, POWDER, LYOPHILIZED, FOR SOLUTION INTRAVENOUS at 15:59

## 2020-05-20 RX ADMIN — NYSTATIN 1000000 UNITS: 100000 SUSPENSION ORAL at 07:36

## 2020-05-20 RX ADMIN — POTASSIUM CHLORIDE 20 MEQ: 750 TABLET, EXTENDED RELEASE ORAL at 18:09

## 2020-05-20 RX ADMIN — INSULIN GLARGINE 20 UNITS: 100 INJECTION, SOLUTION SUBCUTANEOUS at 21:40

## 2020-05-20 RX ADMIN — ISOPROTERENOL HYDROCHLORIDE 0.01 MCG/KG/MIN: 0.2 INJECTION, SOLUTION INTRAMUSCULAR; INTRAVENOUS at 09:31

## 2020-05-20 RX ADMIN — ACETAMINOPHEN 975 MG: 325 TABLET ORAL at 09:17

## 2020-05-20 RX ADMIN — GABAPENTIN 100 MG: 100 CAPSULE ORAL at 20:14

## 2020-05-20 RX ADMIN — PIPERACILLIN AND TAZOBACTAM 2.25 G: 2; .25 INJECTION, POWDER, FOR SOLUTION INTRAVENOUS at 06:25

## 2020-05-20 RX ADMIN — FUROSEMIDE 20 MG: 10 INJECTION, SOLUTION INTRAMUSCULAR; INTRAVENOUS at 10:32

## 2020-05-20 RX ADMIN — TERBUTALINE SULFATE 10 MG: 5 TABLET ORAL at 21:29

## 2020-05-20 RX ADMIN — NYSTATIN 1000000 UNITS: 100000 SUSPENSION ORAL at 11:35

## 2020-05-20 RX ADMIN — MYCOPHENOLATE MOFETIL 1500 MG: 200 POWDER, FOR SUSPENSION ORAL at 07:38

## 2020-05-20 RX ADMIN — HEPARIN SODIUM 5000 UNITS: 5000 INJECTION, SOLUTION INTRAVENOUS; SUBCUTANEOUS at 06:23

## 2020-05-20 RX ADMIN — ACETAMINOPHEN 975 MG: 325 TABLET ORAL at 01:26

## 2020-05-20 RX ADMIN — SENNOSIDES AND DOCUSATE SODIUM 1 TABLET: 8.6; 5 TABLET ORAL at 07:37

## 2020-05-20 RX ADMIN — GABAPENTIN 100 MG: 100 CAPSULE ORAL at 07:37

## 2020-05-20 RX ADMIN — PANTOPRAZOLE SODIUM 40 MG: 40 INJECTION, POWDER, FOR SOLUTION INTRAVENOUS at 07:36

## 2020-05-20 RX ADMIN — ACETAMINOPHEN 975 MG: 325 TABLET ORAL at 18:08

## 2020-05-20 RX ADMIN — TERBUTALINE SULFATE 10 MG: 5 TABLET ORAL at 12:28

## 2020-05-20 RX ADMIN — HEPARIN SODIUM 5000 UNITS: 5000 INJECTION, SOLUTION INTRAVENOUS; SUBCUTANEOUS at 13:37

## 2020-05-20 RX ADMIN — MYCOPHENOLATE MOFETIL 1500 MG: 200 POWDER, FOR SUSPENSION ORAL at 20:14

## 2020-05-20 RX ADMIN — PIPERACILLIN AND TAZOBACTAM 2.25 G: 2; .25 INJECTION, POWDER, FOR SOLUTION INTRAVENOUS at 20:15

## 2020-05-20 RX ADMIN — PREDNISONE 40 MG: 20 TABLET ORAL at 07:36

## 2020-05-20 RX ADMIN — NYSTATIN 1000000 UNITS: 100000 SUSPENSION ORAL at 20:15

## 2020-05-20 RX ADMIN — NYSTATIN 1000000 UNITS: 100000 SUSPENSION ORAL at 15:59

## 2020-05-20 RX ADMIN — HEPARIN SODIUM 5000 UNITS: 5000 INJECTION, SOLUTION INTRAVENOUS; SUBCUTANEOUS at 21:29

## 2020-05-20 RX ADMIN — PREDNISONE 40 MG: 20 TABLET ORAL at 18:08

## 2020-05-20 RX ADMIN — PIPERACILLIN AND TAZOBACTAM 2.25 G: 2; .25 INJECTION, POWDER, FOR SOLUTION INTRAVENOUS at 13:37

## 2020-05-20 RX ADMIN — PIPERACILLIN AND TAZOBACTAM 2.25 G: 2; .25 INJECTION, POWDER, FOR SOLUTION INTRAVENOUS at 01:29

## 2020-05-20 RX ADMIN — INSULIN GLARGINE 20 UNITS: 100 INJECTION, SOLUTION SUBCUTANEOUS at 11:46

## 2020-05-20 RX ADMIN — SENNOSIDES AND DOCUSATE SODIUM 2 TABLET: 8.6; 5 TABLET ORAL at 20:15

## 2020-05-20 ASSESSMENT — ACTIVITIES OF DAILY LIVING (ADL)
ADLS_ACUITY_SCORE: 13

## 2020-05-20 ASSESSMENT — PAIN DESCRIPTION - DESCRIPTORS: DESCRIPTORS: DULL

## 2020-05-20 ASSESSMENT — MIFFLIN-ST. JEOR: SCORE: 1656.35

## 2020-05-20 NOTE — PROGRESS NOTES
Cardiology Consult Progress Note     ASSESSMENT:   Mariusz Sharp is a 59 year old male history of CAD (s/p STEMI with ALYSSA to LAD 6/27/18), LV thrombus, CKD Stage III, PAF, DM Type II, and ICM with EF 20-25% s/p HeartMate 3 LVAD implant 12/31/18 who presented for OHT 5/18/2020    Plan today:   - Wean isoproterenol   - Start terbutaline 10mg PO Q8   - Lasix 20mg IV X1     Pressors: epinephrine discontinued this AM  Other drips: isoproterenol  Paced at    Hemodynamics this AM: CVP 11, PA 32/20, PCWP 18  - Give lasix 20mg IV x1      Serostatus: Donor unknown  CMV: D+, R-, EBV: D- R+, Toxo D- R-  Blood type: A     Immunosuppression:  -Calcineurin Inhibitor: due to renal dysfunction, anti-IL-2 given - Basiliximab 20 mg IV, second dose due 5/22; tacrolimus to follow pending renal function with goal level 10-15 for first 3 months  -Cell cycle Inhibitor: start Cellcept 1500 mg BID (generic 1500 mg given preoperatively)  -Steroid: methylprednisolone 1 gram given preoperatively, followed by 500 mg IV at release of cross clamp; start 125 mg IV q8 hours x 3 doses tomorrow morning, followed by prednisone 1 mg/kg daily (in BID dosing, taper 5 mg daily)  -Endomyocardial biopsy: first 5/25; weekly 4 weeks, bi-weekly 2 months, monthly 3-6 months     Infection Prophylaxis:  -PCP/Toxo: Bactrim holding for now given renal function   -Thrush: Nystatin  -CMV (D+/R-): will start Valcyte pending renal function; 6 months total   -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         Physical Exam   Temp: 98.3  F (36.8  C) Temp src: Oral    Heart Rate: 107 Resp: 14 SpO2: 96 % O2 Device: Nasal cannula Oxygen Delivery: 2 LPM  Vital Signs with Ranges  Temp:  [98  F  (36.7  C)-98.8  F (37.1  C)] 98.3  F (36.8  C)  Heart Rate:  [] 107  Resp:  [8-27] 14  MAP:  [63 mmHg-106 mmHg] 81 mmHg  Arterial Line BP: ()/() 104/64  SpO2:  [92 %-100 %] 96 %  202 lbs 14.4 oz    GEN: NAD, pleasant  HEENT: no icterus  CV: RRR, normal s1/s2, no murmurs/rubs/s3/s4, no heave. +PA catheter.   CHEST: CTAB, midline surgical scar   ABD: soft, NT/ND, NABS  : no flank/suprapubic tenderness  NEURO: AA&Ox3, fluent/appropriate, motor grossly nonfocal  PSYCH: cooperative, affect appropriate       Data   Recent Labs   Lab 05/20/20  0338 05/19/20  2230 05/19/20  1634 05/19/20  0943  05/18/20  1835 05/18/20  1630  05/18/20  1248   WBC 15.8* 17.7* 17.5* 16.8*   < > 20.1*  --   --   --    HGB 7.6* 8.2* 8.7* 8.0*   < > 9.6* 8.9*   < >  --    MCV 84 84 84 84   < > 82  --   --   --     181 184 192   < > 192 185  --   --    INR  --   --   --   --   --  1.54* 2.21*  --  1.42*    137 140 141   < > 141 142   < >  --    POTASSIUM 4.4 4.0 4.2 4.4   < > 3.7 3.8   < >  --    CHLORIDE 102 106 110* 112*   < > 109  --   --   --    CO2 22 20 21 21   < > 24  --   --   --    BUN 48* 43* 38* 36*   < > 39*  --   --   --    CR 2.81* 2.72* 2.59* 2.40*   < > 1.90*  --   --   --    ANIONGAP 12 11 9 8   < > 8  --   --   --    ARLENE 7.9* 7.7* 7.6* 8.4*   < > 8.1*  --   --   --    * 283* 217* 128*   < > 115* 179*   < >  --    ALBUMIN  --   --   --  3.0*  --  2.8*  --   --   --    PROTTOTAL  --   --   --  5.4*  --  5.4*  --   --   --    BILITOTAL  --   --   --  1.0  --  1.5*  --   --   --    ALKPHOS  --   --   --  27*  --  34*  --   --   --    ALT  --   --   --  26  --  30  --   --   --    AST  --   --   --  98*  --  112*  --   --   --     < > = values in this interval not displayed.       Recent Results (from the past 24 hour(s))   XR Chest Port 1 View    Narrative    Portable chest    INDICATION: [place]-Calvin catheter monitoring    COMPARISON: 5/19/2020    FINDINGS: Heart remains mildly enlarged. A right  IJ [place]-Calvin catheter  is present with tip in the main pulmonary artery. Bilateral chest  tubes are again present. Endotracheal tube has been removed. Previous  NG/OG tube has also been removed. Lungs and pulmonary vascularity  otherwise appear normal. There is also catheter tubing overlying the  left axillary vascular territory. Prior cardiac transplantation.      Impression    IMPRESSION: Prior cardiac transplantation. Cardiomegaly. [place]-Calvin  catheter with tip in main pulmonary artery.    ALICIA CEJA MD

## 2020-05-20 NOTE — PROGRESS NOTES
CVTS Progress Note  May 20, 2020    Mariusz Sharp  [id number]  Admitted: 5/17/2020 11:24 PM      CO-MORBIDITIES:   Biventricular heart failure (H)  (primary encounter diagnosis)    ASSESSMENT: 59 year old male with significant PMHx of CAD, HTN, h/o MI s/p stent 2019 with ALYSSA with ICM with LVAD HeartMate III implant, ( EF 20-25%) taking blood thinners, chronic anemia, CKD, D2M on insulin s/p heart transplant, LVAD removal and ICD placement on 5/18/2020 with Dr. Willis, he was extubated on POD#1 on 5/19/2020.     TODAY'S PROGRESS:   - Per cardiology re-evaluate line removal in afternoon  - Continue SQH  - Start HDSSI  - Start lantus 20/day (PTA is 30)  - Transfer to floor once lines can be removed per cardiology  - Isoprotenerol at 0.1, plan to stop when ok to remove lines per cardiology  - Pending heart biopsy, keep CT in place until then    Neurological:  #Sedation   - Monitor neurological status. Delirium preventions and precautions.   - Pain: Tylenol, PRN hydromorphone, Neurontin 100 TID, Melatonin  - HELD PTA pain regimen: PTA oxycodone 20 mg TID, gabapentin 600 mg TID, topical diclofenac PRN, flexeril PRN            - Sedation plan: n/a RASS Awake     Pulmonary:   # COVID negative on 5/18  # Extubated on 5/19/2020  - HOB elevation   - Supplemental oxygen to keep saturation above 92 %.      Cardiovascular:    # HTN  # CAD   #syncope with coughing  #RV dysfunction  - Monitor hemodynamic status.   - Epinephrine infusion, Isoproterenol infusion  - HELD  PTA meds Carvedilol, hydralazine, digoxin     Gastroenterology/Nutrition:  OG tube to suction  - Bowel regimen  - Regular diet, advance as tolerated  - MDSSI     Fluids/Electrolytes:   Electrolyte protocol in place     Renal:  #CKD  - monitor UOP, follow electrolyte lab        Endocrine:  #Stress/Steroid induced hyperglycemia  - HDSSI  - Lantus     ID:  # Immunosuppression per cardiology  # Infectious prophylaxis     Heme:     #Chronic  Anemia     Musculoskeletal:  # No issues      Skin:  # No issues     General Cares/Prophylaxis:    DVT Prophylaxis: Pneumatic Compression Devices  GI Prophylaxis: PPI, SQH  Restraints: none     Lines/ tubes/ drains:  - PIV, Arterial line, PAC, CVC-line     Disposition:  - Floor when ok to remove lines per cardiology    Patient seen, findings and plan discussed with CVTS Fellow    Milad Montalvo MD CA-2, PGY-3  Anesthesiology Resident  CVICU Resident    ====================================    TODAY'S PROGRESS:   SUBJECTIVE:   - NAEO, patient doing well, is feeling well.        OBJECTIVE:   1. VITAL SIGNS:   Temp:  [98  F (36.7  C)-98.8  F (37.1  C)] 98  F (36.7  C)  Heart Rate:  [] 99  Resp:  [8-26] 16  MAP:  [73 mmHg-106 mmHg] 83 mmHg  Arterial Line BP: ()/(57-95) 113/67  SpO2:  [92 %-98 %] 98 %  Ventilation Mode: (S) CPAP/PS  (Continuous positive airway pressure with Pressure Support)  FiO2 (%): 40 %  Rate Set (breaths/minute): 14 breaths/min  Tidal Volume Set (mL): 420 mL  PEEP (cm H2O): 5 cmH2O  Pressure Support (cm H2O): 5 cmH2O  Oxygen Concentration (%): 40 %  Resp: 16      2. INTAKE/ OUTPUT:   I/O last 3 completed shifts:  In: 2267.62 [P.O.:930; I.V.:1287.62; NG/GT:50]  Out: 2897 [Urine:2525; Chest Tube:372]    3. PHYSICAL EXAMINATION:   Neuro: Lying on bed, extubated and alert  Cardio/Resp: Equal chest rise, breath sounds equal  Regular heart rhythm, tachycardia present  Chest tubes with small volume bloody output  Drains:: Incision dressing clean and dry  GI: Abdomen soft, non-distended  MSK: Extremities normal, distal perfusion in tact  Skin: warm and dry, no rashes    4. INVESTIGATIONS:   Arterial Blood Gases   Recent Labs   Lab 05/19/20  0943 05/19/20  0812 05/19/20  0324 05/19/20  0009   PH 7.40 7.40 7.39 7.35   PCO2 27* 33* 38 39   PO2 131* 124* 132* 153*   HCO3 17* 21 23 21     Complete Blood Count   Recent Labs   Lab 05/20/20  0918 05/20/20  0338 05/19/20  2230 05/19/20  1634   WBC  11.2* 15.8* 17.7* 17.5*   HGB 7.4* 7.6* 8.2* 8.7*   * 162 181 184     Basic Metabolic Panel  Recent Labs   Lab 05/20/20  0918 05/20/20  0338 05/19/20  2230 05/19/20  1634    136 137 140   POTASSIUM 4.4 4.4 4.0 4.2   CHLORIDE 104 102 106 110*   CO2 22 22 20 21   BUN 48* 48* 43* 38*   CR 2.58* 2.81* 2.72* 2.59*   * 336* 283* 217*     Liver Function Tests  Recent Labs   Lab 05/19/20  0943 05/18/20  1835 05/18/20  1630 05/18/20  1248 05/18/20  0857 05/18/20  0010   AST 98* 112*  --   --  25 30   ALT 26 30  --   --  32 33   ALKPHOS 27* 34*  --   --  54 51   BILITOTAL 1.0 1.5*  --   --  1.0 0.7   ALBUMIN 3.0* 2.8*  --   --  3.4 3.4   INR  --  1.54* 2.21* 1.42*  --  2.19*     Pancreatic Enzymes  Recent Labs   Lab 05/18/20  0857 05/18/20  0010   AMYLASE 56 65     Coagulation Profile  Recent Labs   Lab 05/18/20  1835 05/18/20  1630 05/18/20  1248 05/18/20  0010   INR 1.54* 2.21* 1.42* 2.19*   PTT 31 31 34 38*     Lactate  Invalid input(s): LACTATE    5. RADIOLOGY:   Recent Results (from the past 24 hour(s))   Cardiac Device Check - Inpatient   Result Value    Date Time Interrogation Session [id number]    Implantable Pulse Generator  [employer]    Implantable Pulse Generator Model D151 DYNAGEN    Implantable Pulse Generator Serial Number [id number]    Type Interrogation Session In Clinic    Clinic Name [facility] [place]    Implantable Pulse Generator Type Defibrillator    Implantable Pulse Generator Implant Date 20180703    Implantable Lead  [employer]    Implantable Lead Model 7742 Ingevity MRI    Implantable Lead Serial Number [id number]    Implantable Lead Implant Date 20180703    Implantable Lead Polarity Type Bipolar Lead    Implantable Lead Location Detail 1 UNKNOWN    Implantable Lead Special Function IS-1 Bipolar    Implantable Lead Location Right Ventricle    Implantable Lead  [employer]    Implantable Lead Model 0180 [employer] [place] SG     Implantable Lead Serial Number [id number]    Implantable Lead Implant Date 20180703    Implantable Lead Polarity Type Bipolar Lead    Implantable Lead Location Detail 1 UNKNOWN    Implantable Lead Special Function Endo-Distal    Implantable Lead Location Right Ventricle    James Setting Mode (NBG Code) VVI    James Setting Lower Rate Limit 40    James Setting Hysterisis Rate Off    Lead Channel Setting Sensing Polarity Bipolar    Lead Channel Setting Sensing Sensitivity 0.6    Lead Channel Setting Sensing Adaptation Mode Adaptive    Lead Channel Setting Pacing Polarity Bipolar    Lead Channel Setting Pacing Pulse Width 1.0    Lead Channel Setting Pacing Amplitude 2.2    Lead Channel Setting Pacing Capture Mode Fixed Pacing    Zone Setting Type Category VF    Zone Setting Vendor Type Category VF    Zone Setting Detection Interval 300.0    Zone Setting Type Category VT    Zone Setting Vendor Type Category VT    Zone Setting Detection Interval 375.0    Zone Setting Type Category VT    Zone Setting Vendor Type Category VT-1    Lead Channel Impedance Value 969.0    Lead Channel Sensing Intrinsic Amplitude 19.8    Lead Channel Pacing Threshold Amplitude 0.6000    Lead Channel Pacing Threshold Pulse Width 1.0    Battery Date Time of Measurements 20152648606774    Battery Status Beginning of Service    Battery Remaining Longevity 144    Capacitor Charge Type Reformation    Capacitor Last Charge Date Time 12112725600888    Capacitor Charge Time 9.563    Capacitor Charge Type Shock    Capacitor Charge Time 8.395    Capacitor Charge Energy 41.0    James Statistic Date Time Start 60399846884209    James Statistic Date Time End 81034692339765    James Statistic RV Percent Paced 1    Therapy Statistic Total Shocks Delivered 5    Therapy Statistic Total Shocks Aborted 0    Therapy Statistic Total ATP Delivered 9    Therapy Statistic Total  Date Time End 82822576387013    Therapy Statistic Recent Shocks Delivered 0    Therapy  Statistic Recent Shocks Aborted 0    Therapy Statistic Recent ATP Delivered 0    Therapy Statistic Recent Date Time Start 20191204090301    Therapy Statistic Recent Date Time End 20200518000000    Episode Statistic Total Count 49    Episode Statistic Type Category VT    Episode Statistic Vendor Type Category NSVT    Episode Statistic Total Count 6    Episode Statistic Type Category VF    Episode Statistic Vendor Type Category VF    Episode Statistic Total Count 0    Episode Statistic Type Category VF_VT_MONITOR    Episode Statistic Total Date Time End 20200518000000    Episode Statistic Total Date Time End 20200518000000    Episode Statistic Total Date Time End 20200518000000    Episode Statistic Recent Count 0    Episode Statistic Type Category VT    Episode Statistic Vendor Type Category NSVT    Episode Statistic Recent Count 0    Episode Statistic Type Category VF    Episode Statistic Vendor Type Category VF    Episode Statistic Recent Count 0    Episode Statistic Type Category VF_VT_MONITOR    Episode Statistic Recent Date Time Start 20191204090301    Episode Statistic Recent Date Time End 20200518000000    Episode Statistic Recent Date Time Start 20191204090301    Episode Statistic Recent Date Time End 20200518000000    Episode Statistic Recent Date Time Start 20191204090301    Episode Statistic Recent Date Time End 20200518000000    Narrative    Patient seen on 3 C  for evaluation and iterative programming of a [employer] Single lead ICD per MD orders. Patient is scheduled for a heart transplant today. Normal ICD function. No episodes/arrhythmias recorded. Intrinsic rhythm = Regular VS @ 64 bpm.  = <1%. Estimated battery longevity to QUIQUE = 12 years. Lead trends appear stable. Patient reports that he is feeling well and denies complaints. Per Anesthesia orders Tachy therapies programmed OFF. LESTER Bryant RN, BSN.     Single lead ICD    I have reviewed and interpreted the device interrogation, settings,  programming and nurse's summary. The device is functioning within normal device parameters. I agree with the current findings, assessment and plan.   XR Chest Port 1 View    Narrative    EXAMINATION:  XR CHEST PORT 1 VW 5/18/2020 6:39 PM.    COMPARISON: 5/18/2020 at 0008 hours.    HISTORY:  s/p heart txp    FINDINGS: Portable AP view of the chest. Postsurgical changes of heart  transplant. Endotracheal tube tip projects over the mid thoracic  trachea. Right IJ [place]-Calvin catheter tip projects over the pulmonary  trunk. Bibasilar chest tubes and 2 mediastinal drains are present.  Epicardial pacer wires are noted. Intact median sternotomy wires.  Retained catheter in the left upper chest at the site of prior  pacemaker.    Midline trachea. Cardiomediastinal silhouette is within normal limits.  Pulmonary vasculature is distinct. Interstitial prominence suggestive  of pulmonary vascular congestion. Trace bilateral pleural effusions.  No appreciable pneumothorax. Upper abdomen is unremarkable.      Impression    IMPRESSION:   1. Postsurgical changes of cardiac transplant with support devices in  appropriate position.  2. Mild pulmonary vascular congestion.  3. Retained catheter in the left upper chest at the site of prior  pacemaker.    I have personally reviewed the examination and initial interpretation  and I agree with the findings.    HIPOLITO DEL ANGEL MD   XR Abdomen Port 1 View    Narrative    XR ABDOMEN PORT 1 VW  5/18/2020 10:17 PM    History:  OG placement.     Comparison: CT abdominal/pelvis dated 5/12/2020    Findings:   50 degrees AP abdominal radiograph. Gastric tube with the tip and  side-port projects over stomach. Nonobstructive bowel gas pattern.  Gallstones projects over right upper quadrant.    Partially visualized [place]-Calvin catheter, bilateral chest tubes and  mediastinal drainage tube. Mild degenerative changes of thoracolumbar  spine.      Impression    IMPRESSION:  1. Gastric tube with the tip  and side-port projects over stomach.  Nonobstructive bowel gas pattern.  2. Cholelithiasis.    I have personally reviewed the examination and initial interpretation  and I agree with the findings.    JHOANA KHAN MD   XR Chest Port 1 View   Result Value    Radiologist flags Low Endotracheal tube tip (Urgent)    Narrative    EXAM: XR CHEST PORT 1 VW  5/19/2020 8:08 AM      HISTORY: sg catheter monitoring    COMPARISON: Chest x-ray from 5/18/2020 and 7/7/2019    FINDINGS: Semiupright portable AP radiograph of the chest.  Postsurgical changes of cardiac transplantation. The enteric tube  courses inferior to the diaphragm and out of the field of view. The  tip of the endotracheal tube not well visualized, but is likely low,  approximately 1 cm above the kris. Right IJ [place]-Calvin catheter with  tip projecting over the pulmonary trunk. Bilateral chest tubes and 3  mediastinal drains with stable positioning. Catheter again seen  projecting over the left axillary region.     Trachea is midline. The cardiac silhouette is unchanged. Mild  bilateral perihilar and basilar interstitial opacities. Small  bilateral pleural effusions. No pneumothorax. No acute osseous or  abdominal abnormality.      Impression    IMPRESSION:    1. The tip of the right IJ [place]-Calvin catheter projects over the  pulmonary trunk.  2. The tip of the endotracheal tube is not well visualized, but is  likely low, approximately 1 cm above the kris.  3. The remaining support devices are stable in positioning.  4. Mild bilateral perihilar and basilar interstitial opacities, likely  pulmonary edema versus atelectasis.  5. Postsurgical changes of cardiac transplantation.    Findings discussed with the senior radiology resident, Dr. Sahil Mirza.    [Urgent Result: Low Endotracheal tube tip]    Finding was identified on 5/19/2020 8:20 AM.     The ordering provider, Dr. Katerine Harper, was contacted by Dr. Tayler Oseguera at 5/19/2020 9:52 AM and  verbalized understanding of the  urgent finding.     I have personally reviewed the examination and initial interpretation  and I agree with the findings.    MAGGIE FONTENOT MD       =========================================

## 2020-05-20 NOTE — PLAN OF CARE
Major Shift Events: AMRIT Q4H, see flowsheet for values. Epi continuing at 0.02 for low SVO2, increasing overnight up to 64. Isuprel continuing at 0.02. Pacemaker set at DDD 100bpm, pt -115 throughout night. An air leak was noted in the pleural CT at 2200, MD notified, no longer present since 0000. Maintaining O2 sats >92% on room air when awake and 2L NC when asleep.   Plan: AMRIT Q4H. Up to chair.   For vital signs and complete assessments, please see documentation flowsheets.

## 2020-05-20 NOTE — PROGRESS NOTES
Donor Culture Results:    Preliminary  positive donor blood/urine/sputum culture results have been uploaded into [facility]. Donor ID [id number].  Dr. Nielson notified of results.

## 2020-05-20 NOTE — PLAN OF CARE
Discharge Planner OT   Patient plan for discharge: Pt would like to return home/local housing independently.   Current status: Evaluation completed and treatment initiated. Therapist educated pt on OT/CR role and discussed POC/Goals for therapy. Therapist educated pt on sternal precautions and safety with functional transfers. Pt completed transfer supine<->seated EOB with CGA/Min A and vc's. Pt completed transfer sit<->standing pivot to bedside recliner chair. Pt completed self cares with setup, and vc's.   Barriers to return to prior living situation: Decreased strength and endurance, Decreased independence with functional transfers/ADLS.   Recommendations for discharge: Anticipate pt will likely progress to discharge to local housing with A and OP CR.   Rationale for recommendations: Pt will benefit from continued IP therapy to address barriers above and to maximize functional independence.        Entered by: Papito Mosley 05/20/2020 2:24 PM

## 2020-05-20 NOTE — PROGRESS NOTES
CVICU Progress Note  May 20, 2020    Mariusz Sharp  [id number]  Admitted: 5/17/2020 11:24 PM      CO-MORBIDITIES:   Biventricular heart failure (H)  (primary encounter diagnosis)    ASSESSMENT: 59 year old male with significant PMHx of CAD, HTN, h/o MI s/p stent 2019 with ALYSSA with ICM with LVAD HeartMate III implant, ( EF 20-25%) taking blood thinners, chronic anemia, CKD, D2M on insulin s/p heart transplant, LVAD removal and ICD placement on 5/18/2020 with Dr. Willis, he was extubated on POD#1 on 5/19/2020.     TODAY'S PROGRESS:   - Per cardiology re-evaluate line removal in afternoon  - Continue SQH  - Start HDSSI  - Start lantus 20/day (PTA is 30)  - Transfer to floor once lines can be removed per cardiology  - Isoprotenerol at 0.1, plan to stop when ok to remove lines per cardiology  - Pending heart biopsy, keep CT in place until then    Neurological:  #Sedation   - Monitor neurological status. Delirium preventions and precautions.   - Pain: Tylenol, PRN hydromorphone, Neurontin 100 TID, Melatonin  - HELD PTA pain regimen: PTA oxycodone 20 mg TID, gabapentin 600 mg TID, topical diclofenac PRN, flexeril PRN            - Sedation plan: n/a RASS Awake     Pulmonary:   # COVID negative on 5/18  # Extubated on 5/19/2020  - HOB elevation   - Supplemental oxygen to keep saturation above 92 %.      Cardiovascular:    # HTN  # CAD   #syncope with coughing  #RV dysfunction  - Monitor hemodynamic status.   - Epinephrine infusion, Isoproterenol infusion  - HELD  PTA meds Carvedilol, hydralazine, digoxin     Gastroenterology/Nutrition:  OG tube to suction  - Bowel regimen  - Regular diet, advance as tolerated  - MDSSI     Fluids/Electrolytes:   Electrolyte protocol in place     Renal:  #CKD  - monitor UOP, follow electrolyte lab        Endocrine:  #Stress/Steroid induced hyperglycemia  - HDSSI  - Lantus     ID:  # Immunosuppression per cardiology  # Infectious prophylaxis     Heme:     #Chronic  Anemia     Musculoskeletal:  # No issues      Skin:  # No issues     General Cares/Prophylaxis:    DVT Prophylaxis: Pneumatic Compression Devices  GI Prophylaxis: PPI, SQH  Restraints: none     Lines/ tubes/ drains:  - PIV, Arterial line, PAC, CVC-line     Disposition:  - Floor when ok to remove lines per cardiology    Patient seen, findings and plan discussed with CVICU Attending Dr. Shelby Montalvo MD CA-2, PGY-3  Anesthesiology Resident  CVICU Resident    ====================================    TODAY'S PROGRESS:   SUBJECTIVE:   - NAEO, patient doing well, is feeling well.        OBJECTIVE:   1. VITAL SIGNS:   Temp:  [98  F (36.7  C)-98.8  F (37.1  C)] 98  F (36.7  C)  Heart Rate:  [] 99  Resp:  [8-26] 16  MAP:  [73 mmHg-106 mmHg] 83 mmHg  Arterial Line BP: ()/(57-95) 113/67  SpO2:  [92 %-98 %] 98 %  Ventilation Mode: (S) CPAP/PS  (Continuous positive airway pressure with Pressure Support)  FiO2 (%): 40 %  Rate Set (breaths/minute): 14 breaths/min  Tidal Volume Set (mL): 420 mL  PEEP (cm H2O): 5 cmH2O  Pressure Support (cm H2O): 5 cmH2O  Oxygen Concentration (%): 40 %  Resp: 16      2. INTAKE/ OUTPUT:   I/O last 3 completed shifts:  In: 2267.62 [P.O.:930; I.V.:1287.62; NG/GT:50]  Out: 2897 [Urine:2525; Chest Tube:372]    3. PHYSICAL EXAMINATION:   Neuro: Lying on bed, extubated and alert  Cardio/Resp: Equal chest rise, breath sounds equal  Regular heart rhythm, tachycardia present  Chest tubes with small volume bloody output  Drains:: Incision dressing clean and dry  GI: Abdomen soft, non-distended  MSK: Extremities normal, distal perfusion in tact  Skin: warm and dry, no rashes    4. INVESTIGATIONS:   Arterial Blood Gases   Recent Labs   Lab 05/19/20  0943 05/19/20  0812 05/19/20  0324 05/19/20  0009   PH 7.40 7.40 7.39 7.35   PCO2 27* 33* 38 39   PO2 131* 124* 132* 153*   HCO3 17* 21 23 21     Complete Blood Count   Recent Labs   Lab 05/20/20  0918 05/20/20  0338 05/19/20  2230  05/19/20  1634   WBC 11.2* 15.8* 17.7* 17.5*   HGB 7.4* 7.6* 8.2* 8.7*   * 162 181 184     Basic Metabolic Panel  Recent Labs   Lab 05/20/20  0918 05/20/20  0338 05/19/20  2230 05/19/20  1634    136 137 140   POTASSIUM 4.4 4.4 4.0 4.2   CHLORIDE 104 102 106 110*   CO2 22 22 20 21   BUN 48* 48* 43* 38*   CR 2.58* 2.81* 2.72* 2.59*   * 336* 283* 217*     Liver Function Tests  Recent Labs   Lab 05/19/20  0943 05/18/20  1835 05/18/20  1630 05/18/20  1248 05/18/20  0857 05/18/20  0010   AST 98* 112*  --   --  25 30   ALT 26 30  --   --  32 33   ALKPHOS 27* 34*  --   --  54 51   BILITOTAL 1.0 1.5*  --   --  1.0 0.7   ALBUMIN 3.0* 2.8*  --   --  3.4 3.4   INR  --  1.54* 2.21* 1.42*  --  2.19*     Pancreatic Enzymes  Recent Labs   Lab 05/18/20  0857 05/18/20  0010   AMYLASE 56 65     Coagulation Profile  Recent Labs   Lab 05/18/20  1835 05/18/20  1630 05/18/20  1248 05/18/20  0010   INR 1.54* 2.21* 1.42* 2.19*   PTT 31 31 34 38*     Lactate  Invalid input(s): LACTATE    5. RADIOLOGY:   Recent Results (from the past 24 hour(s))   Cardiac Device Check - Inpatient   Result Value    Date Time Interrogation Session [id number]    Implantable Pulse Generator  [employer]    Implantable Pulse Generator Model D151 DYNAGEN    Implantable Pulse Generator Serial Number [id number]    Type Interrogation Session In Clinic    Clinic Name [facility]    Implantable Pulse Generator Type Defibrillator    Implantable Pulse Generator Implant Date 20180703    Implantable Lead  [employer]    Implantable Lead Model 7742 Ingevity MRI    Implantable Lead Serial Number [id number]    Implantable Lead Implant Date 20180703    Implantable Lead Polarity Type Bipolar Lead    Implantable Lead Location Detail 1 UNKNOWN    Implantable Lead Special Function IS-1 Bipolar    Implantable Lead Location Right Ventricle    Implantable Lead  Guidant    Implantable Lead Model 0189  [employer] SG    Implantable Lead Serial Number [id number]    Implantable Lead Implant Date 20180703    Implantable Lead Polarity Type Bipolar Lead    Implantable Lead Location Detail 1 UNKNOWN    Implantable Lead Special Function Endo-Distal    Implantable Lead Location Right Ventricle    James Setting Mode (NBG Code) VVI    James Setting Lower Rate Limit 40    James Setting Hysterisis Rate Off    Lead Channel Setting Sensing Polarity Bipolar    Lead Channel Setting Sensing Sensitivity 0.6    Lead Channel Setting Sensing Adaptation Mode Adaptive    Lead Channel Setting Pacing Polarity Bipolar    Lead Channel Setting Pacing Pulse Width 1.0    Lead Channel Setting Pacing Amplitude 2.2    Lead Channel Setting Pacing Capture Mode Fixed Pacing    Zone Setting Type Category VF    Zone Setting Vendor Type Category VF    Zone Setting Detection Interval 300.0    Zone Setting Type Category VT    Zone Setting Vendor Type Category VT    Zone Setting Detection Interval 375.0    Zone Setting Type Category VT    Zone Setting Vendor Type Category VT-1    Lead Channel Impedance Value 969.0    Lead Channel Sensing Intrinsic Amplitude 19.8    Lead Channel Pacing Threshold Amplitude 0.6000    Lead Channel Pacing Threshold Pulse Width 1.0    Battery Date Time of Measurements 48152925353133    Battery Status Beginning of Service    Battery Remaining Longevity 144    Capacitor Charge Type Reformation    Capacitor Last Charge Date Time 98874558664042    Capacitor Charge Time 9.563    Capacitor Charge Type Shock    Capacitor Charge Time 8.395    Capacitor Charge Energy 41.0    James Statistic Date Time Start 50660492765556    James Statistic Date Time End 83991115939778    James Statistic RV Percent Paced 1    Therapy Statistic Total Shocks Delivered 5    Therapy Statistic Total Shocks Aborted 0    Therapy Statistic Total ATP Delivered 9    Therapy Statistic Total  Date Time End 07242780219176    Therapy Statistic Recent Shocks Delivered  0    Therapy Statistic Recent Shocks Aborted 0    Therapy Statistic Recent ATP Delivered 0    Therapy Statistic Recent Date Time Start 20191204090301    Therapy Statistic Recent Date Time End 20200518000000    Episode Statistic Total Count 49    Episode Statistic Type Category VT    Episode Statistic Vendor Type Category NSVT    Episode Statistic Total Count 6    Episode Statistic Type Category VF    Episode Statistic Vendor Type Category VF    Episode Statistic Total Count 0    Episode Statistic Type Category VF_VT_MONITOR    Episode Statistic Total Date Time End 20200518000000    Episode Statistic Total Date Time End 20200518000000    Episode Statistic Total Date Time End 20200518000000    Episode Statistic Recent Count 0    Episode Statistic Type Category VT    Episode Statistic Vendor Type Category NSVT    Episode Statistic Recent Count 0    Episode Statistic Type Category VF    Episode Statistic Vendor Type Category VF    Episode Statistic Recent Count 0    Episode Statistic Type Category VF_VT_MONITOR    Episode Statistic Recent Date Time Start 20191204090301    Episode Statistic Recent Date Time End 20200518000000    Episode Statistic Recent Date Time Start 20191204090301    Episode Statistic Recent Date Time End 20200518000000    Episode Statistic Recent Date Time Start 20191204090301    Episode Statistic Recent Date Time End 20200518000000    Narrative    Patient seen on 3 C  for evaluation and iterative programming of a ProBueno Single lead ICD per MD orders. Patient is scheduled for a heart transplant today. Normal ICD function. No episodes/arrhythmias recorded. Intrinsic rhythm = Regular VS @ 64 bpm.  = <1%. Estimated battery longevity to QUIQUE = 12 years. Lead trends appear stable. Patient reports that he is feeling well and denies complaints. Per Anesthesia orders Tachy therapies programmed OFF. LESTER Bryant RN, BSN.     Single lead ICD    I have reviewed and interpreted the device  interrogation, settings, programming and nurse's summary. The device is functioning within normal device parameters. I agree with the current findings, assessment and plan.   XR Chest Port 1 View    Narrative    EXAMINATION:  XR CHEST PORT 1 VW 5/18/2020 6:39 PM.    COMPARISON: 5/18/2020 at 0008 hours.    HISTORY:  s/p heart txp    FINDINGS: Portable AP view of the chest. Postsurgical changes of heart  transplant. Endotracheal tube tip projects over the mid thoracic  trachea. Right IJ Thedford-Calvin catheter tip projects over the pulmonary  trunk. Bibasilar chest tubes and 2 mediastinal drains are present.  Epicardial pacer wires are noted. Intact median sternotomy wires.  Retained catheter in the left upper chest at the site of prior  pacemaker.    Midline trachea. Cardiomediastinal silhouette is within normal limits.  Pulmonary vasculature is distinct. Interstitial prominence suggestive  of pulmonary vascular congestion. Trace bilateral pleural effusions.  No appreciable pneumothorax. Upper abdomen is unremarkable.      Impression    IMPRESSION:   1. Postsurgical changes of cardiac transplant with support devices in  appropriate position.  2. Mild pulmonary vascular congestion.  3. Retained catheter in the left upper chest at the site of prior  pacemaker.    I have personally reviewed the examination and initial interpretation  and I agree with the findings.    HIPOLITO DEL ANGEL MD   XR Abdomen Port 1 View    Narrative    XR ABDOMEN PORT 1 VW  5/18/2020 10:17 PM    History:  OG placement.     Comparison: CT abdominal/pelvis dated 5/12/2020    Findings:   50 degrees AP abdominal radiograph. Gastric tube with the tip and  side-port projects over stomach. Nonobstructive bowel gas pattern.  Gallstones projects over right upper quadrant.    Partially visualized Thedford-Calvin catheter, bilateral chest tubes and  mediastinal drainage tube. Mild degenerative changes of thoracolumbar  spine.      Impression    IMPRESSION:  1.  Gastric tube with the tip and side-port projects over stomach.  Nonobstructive bowel gas pattern.  2. Cholelithiasis.    I have personally reviewed the examination and initial interpretation  and I agree with the findings.    JHOANA KHAN MD   XR Chest Port 1 View   Result Value    Radiologist flags Low Endotracheal tube tip (Urgent)    Narrative    EXAM: XR CHEST PORT 1 VW  5/19/2020 8:08 AM      HISTORY: sg catheter monitoring    COMPARISON: Chest x-ray from 5/18/2020 and 7/7/2019    FINDINGS: Semiupright portable AP radiograph of the chest.  Postsurgical changes of cardiac transplantation. The enteric tube  courses inferior to the diaphragm and out of the field of view. The  tip of the endotracheal tube not well visualized, but is likely low,  approximately 1 cm above the kris. Right IJ Bulan-Calvin catheter with  tip projecting over the pulmonary trunk. Bilateral chest tubes and 3  mediastinal drains with stable positioning. Catheter again seen  projecting over the left axillary region.     Trachea is midline. The cardiac silhouette is unchanged. Mild  bilateral perihilar and basilar interstitial opacities. Small  bilateral pleural effusions. No pneumothorax. No acute osseous or  abdominal abnormality.      Impression    IMPRESSION:    1. The tip of the right IJ Bulan-Calvin catheter projects over the  pulmonary trunk.  2. The tip of the endotracheal tube is not well visualized, but is  likely low, approximately 1 cm above the kris.  3. The remaining support devices are stable in positioning.  4. Mild bilateral perihilar and basilar interstitial opacities, likely  pulmonary edema versus atelectasis.  5. Postsurgical changes of cardiac transplantation.    Findings discussed with the senior radiology resident, Dr. Sahil Mirza.    [Urgent Result: Low Endotracheal tube tip]    Finding was identified on 5/19/2020 8:20 AM.     The ordering provider, Dr. Katerine Harper, was contacted by Dr. Tayler Oseguera at  5/19/2020 9:52 AM and verbalized understanding of the  urgent finding.     I have personally reviewed the examination and initial interpretation  and I agree with the findings.    MAGGIE FONTENOT MD       =========================================

## 2020-05-20 NOTE — PROGRESS NOTES
05/19/20 1300   Quick Adds   Type of Visit Initial PT Evaluation   Living Environment   Lives With parent(s)   Living Arrangements house   Home Accessibility stairs to enter home   Number of Stairs, Main Entrance 5   Transportation Anticipated car, drives self;family or friend will provide   Living Environment Comment Lives with 83 year-old Father in Father's home in [place]. Has 5 steps to enter home, then all needs met on main level. Has an upper level which patient likes to go to watch TV however no handrails on stairs to upstairs. Has walk-in shower with shower chair.   Self-Care   Usual Activity Tolerance excellent   Current Activity Tolerance fair   Regular Exercise Yes   Activity/Exercise Type walking   Exercise Amount/Frequency daily   Equipment Currently Used at Home shower chair   Activity/Exercise/Self-Care Comment Patient is highly active at baseline, competes with friends/family on his [employer] phone alivia. Says before COVID he was averaging 20,000 steps/day, since COVID started it has only been about 10,000 steps/day. Patient is independent with ADLs and helps to care for Father who has dementia. He is able to drive and manage household chores.   Functional Level Prior   Ambulation 0-->independent   Transferring 0-->independent   Toileting 0-->independent   Bathing 0-->independent   Communication 0-->understands/communicates without difficulty   Swallowing 0-->swallows foods/liquids without difficulty   Cognition 0 - no cognition issues reported   Fall history within last six months no   Which of the above functional risks had a recent onset or change? ambulation;transferring   Prior Functional Level Comment Independent   General Information   Onset of Illness/Injury or Date of Surgery - Date 05/18/20   Referring Physician Brenda Guevara MD    Patient/Family Goals Statement to return home   Pertinent History of Current Problem (include personal factors and/or comorbidities that impact  the POC) 59 year old male with significant PMHx of CAD, HTN, h/o MI s/p stent 2019 with ALYSSA with ICM with LVAD HeartMate III implant, ( EF 20-25%) taking blood thinners, chronic anemia, CKD, D2M on insulin s/p heart transplant, LVAD removal and ICD placement on 5/18/2020 with Dr. Willis, he was ectubated on POD#1 on 5/19/2020.   Precautions/Limitations sternal precautions;fall precautions   General Observations Patient greeted supine in bed, pleasant and motivated for therapy.   General Info Comments Activity: up in chair, advance activity as tolerated   Cognitive Status Examination   Orientation orientation to person, place and time   Level of Consciousness alert   Follows Commands and Answers Questions 100% of the time   Personal Safety and Judgment intact   Pain Assessment   Patient Currently in Pain Yes, see Vital Sign flowsheet   Range of Motion (ROM)   ROM Comment BLE WFL   Strength   Strength Comments generalized deconditioning, demonstrates at least 3+/5 BLE strength with mobility   Bed Mobility   Bed Mobility Comments supine>sit with mod A   Transfer Skills   Transfer Comments sit<>stand with min Ax2   Gait   Gait Comments not assessed   Balance   Balance Comments good sitting balance, fair standing balance   Sensory Examination   Sensory Perception no deficits were identified   General Therapy Interventions   Planned Therapy Interventions balance training;bed mobility training;gait training;strengthening;transfer training;risk factor education;home program guidelines;progressive activity/exercise   Clinical Impression   Criteria for Skilled Therapeutic Intervention yes, treatment indicated   PT Diagnosis impaired functional mobility   Influenced by the following impairments strength, balance, activity tolerance, pain, post-op precautions   Functional limitations due to impairments bed mobility, transfers, gait, stairs, functional endurance   Clinical Presentation Evolving/Changing   Clinical Presentation  "Rationale PMH, clinical judgement   Clinical Decision Making (Complexity) Moderate complexity   Therapy Frequency Daily   Predicted Duration of Therapy Intervention (days/wks) 2 weeks   Anticipated Equipment Needs at Discharge   (TBD)   Anticipated Discharge Disposition Home with Assist  (OP Cardiac Rehab)   Risk & Benefits of therapy have been explained Yes   Patient, Family & other staff in agreement with plan of care Yes   [facility] TM \"6 Clicks\"   2016, Trustees of [facility], under license to [employer].  All rights reserved.   6 Clicks Short Forms Basic Mobility Inpatient Short Form   [facility] AM-PAC  \"6 Clicks\" V.2 Basic Mobility Inpatient Short Form   1. Turning from your back to your side while in a flat bed without using bedrails? 3 - A Little   2. Moving from lying on your back to sitting on the side of a flat bed without using bedrails? 2 - A Lot   3. Moving to and from a bed to a chair (including a wheelchair)? 3 - A Little   4. Standing up from a chair using your arms (e.g., wheelchair, or bedside chair)? 3 - A Little   5. To walk in hospital room? 2 - A Lot   6. Climbing 3-5 steps with a railing? 2 - A Lot   Basic Mobility Raw Score (Score out of 24.Lower scores equate to lower levels of function) 15   Total Evaluation Time   Total Evaluation Time (Minutes) 7     "

## 2020-05-20 NOTE — PHARMACY-TRANSPLANT NOTE
Adult Heart Transplant Post Operative Note    59 year old male s/p heart transplant on 05/18/2020 for ischemic cardiomyopathy.      Planned immunosuppression regimen to include basilixumab POD#0 & POD#4 followed by tacrolimus (goal 10-12 mcg/L), mycophenolate and prednisone.      Opportunistic pathogen prophylaxis includes: trimethoprim/sulfamethoxazole, nystatin and valganciclovir.    Patient is not enrolled in medication study.    Patient with planned immunosuppression and prophylaxis as above.  Pharmacy will monitor for medication interactions and immunosuppression levels in conjunction with the team. Medication therapy needs for discharge planning will continue to be addressed throughout the current admission via multidisciplinary rounds and order review. Pharmacy will make recommendations as appropriate.    Jolene Gutierrez, Kavya  CVICU and Advanced Heart Failure Pharmacist  Pager [phone]

## 2020-05-20 NOTE — PLAN OF CARE
Major Shift Events:  Epi gtt off per MAR, isuprel gtt continues per MAR. Patient up to chair with therapy, stand by assist. Patient reports adequate pain control, rating dull incisional pain 0-1/10 on numerical pain scale. Patient frequently using incentive spirometer. William #'s q4 hrs, see flowsheets.    Plan: Continue to monitor hemodynamic status, update primary team with any acute changes, and continue with plan of care.    For vital signs and complete assessments, please see documentation flowsheets.     Problem: Adult Inpatient Plan of Care  Goal: Plan of Care Review  5/20/2020 1733 by Kaylah Alex RN  Outcome: Improving  5/20/2020 0532 by Michelle John RN  Outcome: Improving  Goal: Patient-Specific Goal (Individualization)  5/20/2020 1733 by Kaylah Alex RN  Outcome: Improving  5/20/2020 0532 by Michelle John RN  Outcome: Improving  Goal: Absence of Hospital-Acquired Illness or Injury  5/20/2020 1733 by Kaylah Alex RN  Outcome: Improving  5/20/2020 0532 by Michelle John RN  Outcome: Improving  Goal: Optimal Comfort and Wellbeing  5/20/2020 1733 by Kaylah Alex RN  Outcome: Improving  5/20/2020 0532 by Michelle John RN  Outcome: Improving  Goal: Readiness for Transition of Care  5/20/2020 1733 by Kaylah Alex RN  Outcome: Improving  5/20/2020 0532 by Michelle John RN  Outcome: Improving  Goal: Rounds/Family Conference  5/20/2020 1733 by Kaylah Alex RN  Outcome: Improving  5/20/2020 0532 by Michelle oJhn RN  Outcome: Improving     Problem: Adjustment to Transplant (Heart Transplant)  Goal: Optimal Coping with Organ Transplant  5/20/2020 1733 by Kaylah Alex RN  Outcome: Improving  5/20/2020 0532 by Michelle John RN  Outcome: Improving     Problem: Bleeding (Heart Transplant)  Goal: Absence of Bleeding  5/20/2020 1733 by Kaylah Alex RN  Outcome: Improving  5/20/2020 0532 by Michelle John RN  Outcome: Improving     Problem: Bowel  Elimination Impaired (Heart Transplant)  Goal: Effective Bowel Elimination  5/20/2020 1733 by Kaylah Alex RN  Outcome: Improving  5/20/2020 0532 by Michelle John RN  Outcome: Improving     Problem: Fluid Imbalance (Heart Transplant)  Goal: Fluid Balance  5/20/2020 1733 by Kaylah Alex RN  Outcome: Improving  5/20/2020 0532 by Michelle John RN  Outcome: Improving     Problem: Donor Organ Function (Heart Transplant)  Goal: Effective Cardiac Function  5/20/2020 1733 by Kaylah Alex RN  Outcome: Improving  5/20/2020 0532 by Michelle John RN  Outcome: Improving     Problem: Infection (Heart Transplant)  Goal: Absence of Infection Signs/Symptoms  5/20/2020 1733 by Kaylah Alex RN  Outcome: Improving  5/20/2020 0532 by Michelle John RN  Outcome: Improving     Problem: Ongoing Anesthesia Effects (Heart Transplant)  Goal: Anesthesia/Sedation Recovery  5/20/2020 1733 by Kaylah Alex RN  Outcome: Improving  5/20/2020 0532 by Michelle John RN  Outcome: Improving     Problem: Oral Intake Inadequate (Heart Transplant)  Goal: Optimal Nutrition Intake  5/20/2020 1733 by Kaylah Alex RN  Outcome: Improving  5/20/2020 0532 by Michelle John RN  Outcome: Improving     Problem: Pain (Heart Transplant)  Goal: Acceptable Pain Control  5/20/2020 1733 by Kaylah Alex RN  Outcome: Improving  5/20/2020 0532 by Michelle John RN  Outcome: Improving     Problem: Postoperative Nausea and Vomiting (Heart Transplant)  Goal: Nausea and Vomiting Relief  5/20/2020 1733 by Kaylah Alex RN  Outcome: Improving  5/20/2020 0532 by Michelle John RN  Outcome: Improving     Problem: Postoperative Urinary Retention (Heart Transplant)  Goal: Effective Urinary Elimination  5/20/2020 1733 by Kaylah Alex RN  Outcome: Improving  5/20/2020 0532 by Michelle John RN  Outcome: Improving     Problem: Respiratory Compromise (Heart Transplant)  Goal: Effective Oxygenation and  Ventilation  5/20/2020 1733 by Kaylah Alex RN  Outcome: Improving  5/20/2020 0532 by Michelle John RN  Outcome: Improving     Problem: OT General Care Plan  Goal: Transfer (OT)  Description: Transfer (OT)  5/20/2020 1733 by Kaylah Alex RN  Outcome: Improving  5/20/2020 1422 by Papito Mosley OT  Flowsheets (Taken 5/20/2020 1422)  OT Goal: transfer: (Shower transfer)   Independent   within precautions  Goal: Toilet Transfer/Toileting (OT)  Description: Toilet Transfer/Toileting (OT)  5/20/2020 1733 by Kaylah Alex RN  Outcome: Improving  5/20/2020 1422 by Papito oMsley OT  Flowsheets (Taken 5/20/2020 1422)  OT goal: toilet transfer/toileting:   Independent   toilet transfer   cleaning and garment management   within precautions

## 2020-05-20 NOTE — PROGRESS NOTES
05/20/20 1400   Quick Adds   Type of Visit Initial Occupational Therapy Evaluation   Living Environment   Lives With parent(s)   Living Arrangements house   Home Accessibility stairs to enter home   Number of Stairs, Main Entrance 6   Stair Railings, Main Entrance railing on right side (ascending)   Transportation Anticipated car, drives self;family or friend will provide   Living Environment Comment Pt has a walk in shower with shower chair. Grab bars present.    Self-Care   Usual Activity Tolerance good   Current Activity Tolerance fair   Regular Exercise Yes   Activity/Exercise Type walking   Exercise Amount/Frequency daily   Equipment Currently Used at Home grab bar, tub/shower;shower chair   Functional Level   Ambulation 0-->independent   Transferring 0-->independent   Toileting 0-->independent   Bathing 0-->independent   Dressing 0-->independent   Eating 0-->independent   Communication 0-->understands/communicates without difficulty   Swallowing 0-->swallows foods/liquids without difficulty   Cognition 0 - no cognition issues reported   Fall history within last six months no   Which of the above functional risks had a recent onset or change? ambulation;transferring;toileting;bathing;dressing   General Information   Onset of Illness/Injury or Date of Surgery - Date 05/18/20   Referring Physician Brenda Guevara MD    Patient/Family Goals Statement Pt would like to return home independently.    Additional Occupational Profile Info/Pertinent History of Current Problem 59 year old male with significant PMHx of CAD, HTN, h/o MI s/p stent 2019 with ALYSSA with ICM with LVAD HeartMate III implant, ( EF 20-25%) taking blood thinners, chronic anemia, CKD, D2M on insulin s/p heart transplant, LVAD removal and ICD placement on 5/18/2020 with Dr. Willis, he was extubated on POD#1 on 5/19/2020.    Precautions/Limitations fall precautions;sternal precautions   Weight-Bearing Status - LUE   (10lbs)   Weight-Bearing  "Status - RUE   (10lbs)   Weight-Bearing Status - LLE full weight-bearing   Weight-Bearing Status - RLE full weight-bearing   Cognitive Status Examination   Orientation orientation to person, place and time   Level of Consciousness alert   Follows Commands (Cognition) WNL   Visual Perception   Visual Perception No deficits were identified;Wears glasses   Sensory Examination   Sensory Quick Adds No deficits were identified   Range of Motion (ROM)   ROM Comment WFL, within precautions.    Strength   Strength Comments BUE  strength WNL. Generalized weakness present.    Mobility   Bed Mobility Comments CGA/Min A and vc's.    Transfer Skills   Transfer Comments CGA/Min A and vc's.    Activities of Daily Living Analysis   Impairments Contributing to Impaired Activities of Daily Living flexibility decreased;post surgical precautions;strength decreased   General Therapy Interventions   Planned Therapy Interventions ADL retraining;IADL retraining;bed mobility training;ROM;strengthening;stretching;transfer training;home program guidelines;progressive activity/exercise   Clinical Impression   Criteria for Skilled Therapeutic Interventions Met yes, treatment indicated   OT Diagnosis Decreased independence with functional transfers and ADLs/IADLs.    Influenced by the following impairments Decreased strength/endurance, Fatigue, decreased mobility.    Assessment of Occupational Performance 3-5 Performance Deficits   Identified Performance Deficits Decreased independence with functional transfers/ADLs/IADLs.    Clinical Decision Making (Complexity) Low complexity   Therapy Frequency Daily   Predicted Duration of Therapy Intervention (days/wks) 5/27/2020   Anticipated Discharge Disposition Home with Assist;Home with Outpatient Therapy   Risks and Benefits of Treatment have been explained. Yes   Patient, Family & other staff in agreement with plan of care Yes   [facility] AM-PAC  \"6 Clicks\" Daily Activity Inpatient Short " Form   1. Putting on and taking off regular lower body clothing? 2 - A Lot   2. Bathing (including washing, rinsing, drying)? 2 - A Lot   3. Toileting, which includes using toilet, bedpan or urinal? 1 - Total   4. Putting on and taking off regular upper body clothing? 3 - A Little   5. Taking care of personal grooming such as brushing teeth? 4 - None   6. Eating meals? 4 - None   Daily Activity Raw Score (Score out of 24.Lower scores equate to lower levels of function) 16   Total Evaluation Time   Total Evaluation Time (Minutes) 10

## 2020-05-20 NOTE — PHARMACY-VANCOMYCIN DOSING SERVICE
Pharmacy Vancomycin Note  Date of Service May 20, 2020  Patient's  1960   59 year old, male    Indication: Surgical Prophylaxis  Goal Trough Level: 15-20 mg/L  Day of Therapy: 3  Current Vancomycin regimen:  Intermittent based on levels    Current estimated CrCl = Estimated Creatinine Clearance: 32.3 mL/min (A) (based on SCr of 2.58 mg/dL (H)).    Creatinine for last 3 days  2020: 12:10 AM Creatinine 2.10 mg/dL;  8:57 AM Creatinine 1.95 mg/dL;  6:35 PM Creatinine 1.90 mg/dL;  9:57 PM Creatinine 1.85 mg/dL  2020:  3:24 AM Creatinine 2.12 mg/dL;  9:43 AM Creatinine 2.40 mg/dL;  4:34 PM Creatinine 2.59 mg/dL; 10:30 PM Creatinine 2.72 mg/dL  2020:  3:38 AM Creatinine 2.81 mg/dL;  9:18 AM Creatinine 2.58 mg/dL    Recent Vancomycin Levels (past 3 days)  2020: 11:45 AM Vancomycin Level 15.3 mg/L    Vancomycin IV Administrations (past 72 hours)                   vancomycin 1500 mg in 0.9% NaCl 250 ml intermittent infusion 1,500 mg (mg) 1,500 mg Given 20 1418    vancomycin vial 1000 mg (mg) 1,000 mg Given 20 1252                Nephrotoxins and other renal medications (From now, onward)    Start     Dose/Rate Route Frequency Ordered Stop    20 1600  vancomycin 1500 mg in 0.9% NaCl 250 ml intermittent infusion 1,500 mg      1,500 mg  over 90 Minutes Intravenous ONCE 20 1333      20 0642  vancomycin place tai - receiving intermittent dosing      1 each Does not apply SEE ADMIN INSTRUCTIONS 20 0643      20 0700  piperacillin-tazobactam (ZOSYN) 2.25 g vial to attach to  ml bag      2.25 g  over 30 Minutes Intravenous EVERY 6 HOURS 20 0642 20 1959             Contrast Orders - past 72 hours (72h ago, onward)    None          Interpretation of levels and current regimen:  ~ 24 hour level is  Therapeutic    Has serum creatinine changed > 50% in last 72 hours: Yes    Urine output:  good urine output    Renal Function: Improving    Plan:  1.   Re-dose with 1500 mg IV once  2.  Pharmacy will check trough levels as appropriate in 1-3 Days.    3. Serum creatinine levels will be ordered daily for the first week of therapy and at least twice weekly for subsequent weeks.      Jolene Gutierrez RPH        .

## 2020-05-21 ENCOUNTER — APPOINTMENT (OUTPATIENT)
Dept: PHYSICAL THERAPY | Facility: CLINIC | Age: 60
DRG: 001 | End: 2020-05-21
Attending: THORACIC SURGERY (CARDIOTHORACIC VASCULAR SURGERY)
Payer: COMMERCIAL

## 2020-05-21 ENCOUNTER — APPOINTMENT (OUTPATIENT)
Dept: GENERAL RADIOLOGY | Facility: CLINIC | Age: 60
DRG: 001 | End: 2020-05-21
Attending: THORACIC SURGERY (CARDIOTHORACIC VASCULAR SURGERY)
Payer: COMMERCIAL

## 2020-05-21 ENCOUNTER — APPOINTMENT (OUTPATIENT)
Dept: OCCUPATIONAL THERAPY | Facility: CLINIC | Age: 60
DRG: 001 | End: 2020-05-21
Attending: THORACIC SURGERY (CARDIOTHORACIC VASCULAR SURGERY)
Payer: COMMERCIAL

## 2020-05-21 PROBLEM — I50.82 BIVENTRICULAR HEART FAILURE (H): Status: ACTIVE | Noted: 2020-05-17

## 2020-05-21 LAB
ANION GAP SERPL CALCULATED.3IONS-SCNC: 8 MMOL/L (ref 3–14)
ANION GAP SERPL CALCULATED.3IONS-SCNC: 9 MMOL/L (ref 3–14)
BASE DEFICIT BLDV-SCNC: 0.1 MMOL/L
BASE DEFICIT BLDV-SCNC: 3.4 MMOL/L
BASE EXCESS BLDV CALC-SCNC: 1 MMOL/L
BUN SERPL-MCNC: 42 MG/DL (ref 7–30)
BUN SERPL-MCNC: 48 MG/DL (ref 7–30)
CALCIUM SERPL-MCNC: 7.4 MG/DL (ref 8.5–10.1)
CALCIUM SERPL-MCNC: 7.6 MG/DL (ref 8.5–10.1)
CHLORIDE SERPL-SCNC: 107 MMOL/L (ref 94–109)
CHLORIDE SERPL-SCNC: 107 MMOL/L (ref 94–109)
CO2 SERPL-SCNC: 21 MMOL/L (ref 20–32)
CO2 SERPL-SCNC: 23 MMOL/L (ref 20–32)
COPATH REPORT: NORMAL
CREAT SERPL-MCNC: 1.8 MG/DL (ref 0.66–1.25)
CREAT SERPL-MCNC: 2.1 MG/DL (ref 0.66–1.25)
ERYTHROCYTE [DISTWIDTH] IN BLOOD BY AUTOMATED COUNT: 16.4 % (ref 10–15)
ERYTHROCYTE [DISTWIDTH] IN BLOOD BY AUTOMATED COUNT: 16.7 % (ref 10–15)
GFR SERPL CREATININE-BSD FRML MDRD: 34 ML/MIN/{1.73_M2}
GFR SERPL CREATININE-BSD FRML MDRD: 41 ML/MIN/{1.73_M2}
GLUCOSE BLDC GLUCOMTR-MCNC: 227 MG/DL (ref 70–99)
GLUCOSE BLDC GLUCOMTR-MCNC: 264 MG/DL (ref 70–99)
GLUCOSE BLDC GLUCOMTR-MCNC: 271 MG/DL (ref 70–99)
GLUCOSE BLDC GLUCOMTR-MCNC: 283 MG/DL (ref 70–99)
GLUCOSE BLDC GLUCOMTR-MCNC: 289 MG/DL (ref 70–99)
GLUCOSE BLDC GLUCOMTR-MCNC: 304 MG/DL (ref 70–99)
GLUCOSE BLDC GLUCOMTR-MCNC: 309 MG/DL (ref 70–99)
GLUCOSE BLDC GLUCOMTR-MCNC: 315 MG/DL (ref 70–99)
GLUCOSE BLDC GLUCOMTR-MCNC: 318 MG/DL (ref 70–99)
GLUCOSE BLDC GLUCOMTR-MCNC: 342 MG/DL (ref 70–99)
GLUCOSE BLDC GLUCOMTR-MCNC: 366 MG/DL (ref 70–99)
GLUCOSE SERPL-MCNC: 235 MG/DL (ref 70–99)
GLUCOSE SERPL-MCNC: 344 MG/DL (ref 70–99)
HCO3 BLDV-SCNC: 21 MMOL/L (ref 21–28)
HCO3 BLDV-SCNC: 25 MMOL/L (ref 21–28)
HCO3 BLDV-SCNC: 27 MMOL/L (ref 21–28)
HCT VFR BLD AUTO: 24.4 % (ref 40–53)
HCT VFR BLD AUTO: 24.8 % (ref 40–53)
HGB BLD-MCNC: 7.6 G/DL (ref 13.3–17.7)
HGB BLD-MCNC: 7.7 G/DL (ref 13.3–17.7)
MCH RBC QN AUTO: 25.8 PG (ref 26.5–33)
MCH RBC QN AUTO: 25.9 PG (ref 26.5–33)
MCHC RBC AUTO-ENTMCNC: 31 G/DL (ref 31.5–36.5)
MCHC RBC AUTO-ENTMCNC: 31.1 G/DL (ref 31.5–36.5)
MCV RBC AUTO: 83 FL (ref 78–100)
MCV RBC AUTO: 83 FL (ref 78–100)
O2/TOTAL GAS SETTING VFR VENT: ABNORMAL %
O2/TOTAL GAS SETTING VFR VENT: NORMAL %
O2/TOTAL GAS SETTING VFR VENT: NORMAL %
OXYHGB MFR BLDV: 60 %
OXYHGB MFR BLDV: 62 %
OXYHGB MFR BLDV: 63 %
PCO2 BLDV: 36 MM HG (ref 40–50)
PCO2 BLDV: 42 MM HG (ref 40–50)
PCO2 BLDV: 46 MM HG (ref 40–50)
PH BLDV: 7.37 PH (ref 7.32–7.43)
PH BLDV: 7.39 PH (ref 7.32–7.43)
PH BLDV: 7.39 PH (ref 7.32–7.43)
PLATELET # BLD AUTO: 166 10E9/L (ref 150–450)
PLATELET # BLD AUTO: 167 10E9/L (ref 150–450)
PO2 BLDV: 36 MM HG (ref 25–47)
PO2 BLDV: 37 MM HG (ref 25–47)
PO2 BLDV: 37 MM HG (ref 25–47)
POTASSIUM SERPL-SCNC: 3.9 MMOL/L (ref 3.4–5.3)
POTASSIUM SERPL-SCNC: 4 MMOL/L (ref 3.4–5.3)
RBC # BLD AUTO: 2.94 10E12/L (ref 4.4–5.9)
RBC # BLD AUTO: 2.98 10E12/L (ref 4.4–5.9)
SODIUM SERPL-SCNC: 138 MMOL/L (ref 133–144)
SODIUM SERPL-SCNC: 138 MMOL/L (ref 133–144)
WBC # BLD AUTO: 10 10E9/L (ref 4–11)
WBC # BLD AUTO: 10.6 10E9/L (ref 4–11)

## 2020-05-21 PROCEDURE — 25000128 H RX IP 250 OP 636: Performed by: PHYSICIAN ASSISTANT

## 2020-05-21 PROCEDURE — 21400000 ZZH R&B CCU UMMC

## 2020-05-21 PROCEDURE — 97116 GAIT TRAINING THERAPY: CPT | Mod: GP

## 2020-05-21 PROCEDURE — 00000146 ZZHCL STATISTIC GLUCOSE BY METER IP

## 2020-05-21 PROCEDURE — 25000132 ZZH RX MED GY IP 250 OP 250 PS 637: Performed by: SURGERY

## 2020-05-21 PROCEDURE — 80048 BASIC METABOLIC PNL TOTAL CA: CPT | Performed by: SURGERY

## 2020-05-21 PROCEDURE — 4A023N6 MEASUREMENT OF CARDIAC SAMPLING AND PRESSURE, RIGHT HEART, PERCUTANEOUS APPROACH: ICD-10-PCS | Performed by: INTERNAL MEDICINE

## 2020-05-21 PROCEDURE — 85027 COMPLETE CBC AUTOMATED: CPT | Performed by: SURGERY

## 2020-05-21 PROCEDURE — 25000128 H RX IP 250 OP 636: Performed by: STUDENT IN AN ORGANIZED HEALTH CARE EDUCATION/TRAINING PROGRAM

## 2020-05-21 PROCEDURE — 40000048 ZZH STATISTIC DAILY SWAN MONITORING

## 2020-05-21 PROCEDURE — 25000132 ZZH RX MED GY IP 250 OP 250 PS 637: Performed by: STUDENT IN AN ORGANIZED HEALTH CARE EDUCATION/TRAINING PROGRAM

## 2020-05-21 PROCEDURE — 25800030 ZZH RX IP 258 OP 636: Performed by: STUDENT IN AN ORGANIZED HEALTH CARE EDUCATION/TRAINING PROGRAM

## 2020-05-21 PROCEDURE — 40000196 ZZH STATISTIC RAPCV CVP MONITORING

## 2020-05-21 PROCEDURE — 99233 SBSQ HOSP IP/OBS HIGH 50: CPT | Mod: GC | Performed by: INTERNAL MEDICINE

## 2020-05-21 PROCEDURE — 97530 THERAPEUTIC ACTIVITIES: CPT | Mod: GP

## 2020-05-21 PROCEDURE — 82805 BLOOD GASES W/O2 SATURATION: CPT | Performed by: SURGERY

## 2020-05-21 PROCEDURE — 25000132 ZZH RX MED GY IP 250 OP 250 PS 637: Performed by: PHYSICIAN ASSISTANT

## 2020-05-21 PROCEDURE — 25000128 H RX IP 250 OP 636: Performed by: THORACIC SURGERY (CARDIOTHORACIC VASCULAR SURGERY)

## 2020-05-21 PROCEDURE — 71045 X-RAY EXAM CHEST 1 VIEW: CPT

## 2020-05-21 PROCEDURE — 25000131 ZZH RX MED GY IP 250 OP 636 PS 637: Performed by: THORACIC SURGERY (CARDIOTHORACIC VASCULAR SURGERY)

## 2020-05-21 PROCEDURE — 25000132 ZZH RX MED GY IP 250 OP 250 PS 637: Performed by: THORACIC SURGERY (CARDIOTHORACIC VASCULAR SURGERY)

## 2020-05-21 PROCEDURE — 25800030 ZZH RX IP 258 OP 636: Performed by: THORACIC SURGERY (CARDIOTHORACIC VASCULAR SURGERY)

## 2020-05-21 PROCEDURE — 25000125 ZZHC RX 250: Performed by: STUDENT IN AN ORGANIZED HEALTH CARE EDUCATION/TRAINING PROGRAM

## 2020-05-21 PROCEDURE — 97535 SELF CARE MNGMENT TRAINING: CPT | Mod: GO

## 2020-05-21 PROCEDURE — 02BM3ZX EXCISION OF VENTRICULAR SEPTUM, PERCUTANEOUS APPROACH, DIAGNOSTIC: ICD-10-PCS | Performed by: INTERNAL MEDICINE

## 2020-05-21 PROCEDURE — 40000014 ZZH STATISTIC ARTERIAL MONITORING DAILY

## 2020-05-21 PROCEDURE — 36415 COLL VENOUS BLD VENIPUNCTURE: CPT | Performed by: THORACIC SURGERY (CARDIOTHORACIC VASCULAR SURGERY)

## 2020-05-21 PROCEDURE — 87040 BLOOD CULTURE FOR BACTERIA: CPT | Performed by: THORACIC SURGERY (CARDIOTHORACIC VASCULAR SURGERY)

## 2020-05-21 PROCEDURE — 25000131 ZZH RX MED GY IP 250 OP 636 PS 637: Performed by: PHYSICIAN ASSISTANT

## 2020-05-21 PROCEDURE — 25000131 ZZH RX MED GY IP 250 OP 636 PS 637: Performed by: STUDENT IN AN ORGANIZED HEALTH CARE EDUCATION/TRAINING PROGRAM

## 2020-05-21 RX ORDER — VALGANCICLOVIR 450 MG/1
450 TABLET, FILM COATED ORAL EVERY OTHER DAY
Status: DISCONTINUED | OUTPATIENT
Start: 2020-05-21 | End: 2020-05-22

## 2020-05-21 RX ORDER — PIPERACILLIN SODIUM, TAZOBACTAM SODIUM 3; .375 G/15ML; G/15ML
3.38 INJECTION, POWDER, LYOPHILIZED, FOR SOLUTION INTRAVENOUS EVERY 6 HOURS
Status: COMPLETED | OUTPATIENT
Start: 2020-05-21 | End: 2020-05-22

## 2020-05-21 RX ORDER — SODIUM CHLORIDE 9 MG/ML
INJECTION, SOLUTION INTRAVENOUS CONTINUOUS
Status: DISCONTINUED | OUTPATIENT
Start: 2020-05-21 | End: 2020-05-29 | Stop reason: HOSPADM

## 2020-05-21 RX ORDER — FUROSEMIDE 10 MG/ML
20 INJECTION INTRAMUSCULAR; INTRAVENOUS ONCE
Status: COMPLETED | OUTPATIENT
Start: 2020-05-21 | End: 2020-05-21

## 2020-05-21 RX ORDER — DEXTROSE MONOHYDRATE 100 MG/ML
INJECTION, SOLUTION INTRAVENOUS CONTINUOUS PRN
Status: DISCONTINUED | OUTPATIENT
Start: 2020-05-21 | End: 2020-05-24 | Stop reason: CLARIF

## 2020-05-21 RX ORDER — NICOTINE POLACRILEX 4 MG
15-30 LOZENGE BUCCAL
Status: DISCONTINUED | OUTPATIENT
Start: 2020-05-21 | End: 2020-05-29 | Stop reason: HOSPADM

## 2020-05-21 RX ORDER — DEXTROSE MONOHYDRATE 25 G/50ML
25-50 INJECTION, SOLUTION INTRAVENOUS
Status: DISCONTINUED | OUTPATIENT
Start: 2020-05-21 | End: 2020-05-29 | Stop reason: HOSPADM

## 2020-05-21 RX ADMIN — TERBUTALINE SULFATE 10 MG: 5 TABLET ORAL at 05:06

## 2020-05-21 RX ADMIN — HEPARIN SODIUM 5000 UNITS: 5000 INJECTION, SOLUTION INTRAVENOUS; SUBCUTANEOUS at 22:04

## 2020-05-21 RX ADMIN — PANTOPRAZOLE SODIUM 40 MG: 40 TABLET, DELAYED RELEASE ORAL at 07:40

## 2020-05-21 RX ADMIN — PIPERACILLIN AND TAZOBACTAM 3.38 G: 3; .375 INJECTION, POWDER, FOR SOLUTION INTRAVENOUS at 20:06

## 2020-05-21 RX ADMIN — HUMAN INSULIN 4.5 UNITS/HR: 100 INJECTION, SOLUTION SUBCUTANEOUS at 15:39

## 2020-05-21 RX ADMIN — VALGANCICLOVIR 450 MG: 450 TABLET, FILM COATED ORAL at 16:59

## 2020-05-21 RX ADMIN — PREDNISONE 40 MG: 20 TABLET ORAL at 07:41

## 2020-05-21 RX ADMIN — NYSTATIN 1000000 UNITS: 100000 SUSPENSION ORAL at 07:41

## 2020-05-21 RX ADMIN — FUROSEMIDE 20 MG: 10 INJECTION, SOLUTION INTRAMUSCULAR; INTRAVENOUS at 09:48

## 2020-05-21 RX ADMIN — PIPERACILLIN AND TAZOBACTAM 3.38 G: 3; .375 INJECTION, POWDER, FOR SOLUTION INTRAVENOUS at 14:18

## 2020-05-21 RX ADMIN — MYCOPHENOLATE MOFETIL 1500 MG: 200 POWDER, FOR SUSPENSION ORAL at 07:42

## 2020-05-21 RX ADMIN — PREDNISONE 40 MG: 20 TABLET ORAL at 17:44

## 2020-05-21 RX ADMIN — SODIUM CHLORIDE: 9 INJECTION, SOLUTION INTRAVENOUS at 15:51

## 2020-05-21 RX ADMIN — NYSTATIN 1000000 UNITS: 100000 SUSPENSION ORAL at 17:00

## 2020-05-21 RX ADMIN — NYSTATIN 1000000 UNITS: 100000 SUSPENSION ORAL at 20:05

## 2020-05-21 RX ADMIN — ACETAMINOPHEN 975 MG: 325 TABLET ORAL at 02:02

## 2020-05-21 RX ADMIN — TERBUTALINE SULFATE 10 MG: 5 TABLET ORAL at 11:56

## 2020-05-21 RX ADMIN — NYSTATIN 1000000 UNITS: 100000 SUSPENSION ORAL at 11:51

## 2020-05-21 RX ADMIN — INSULIN HUMAN 12 UNITS: 100 INJECTION, SUSPENSION SUBCUTANEOUS at 11:47

## 2020-05-21 RX ADMIN — GABAPENTIN 100 MG: 100 CAPSULE ORAL at 07:40

## 2020-05-21 RX ADMIN — POTASSIUM CHLORIDE 20 MEQ: 750 TABLET, EXTENDED RELEASE ORAL at 06:22

## 2020-05-21 RX ADMIN — ACETAMINOPHEN 975 MG: 325 TABLET ORAL at 09:48

## 2020-05-21 RX ADMIN — VANCOMYCIN HYDROCHLORIDE 1500 MG: 10 INJECTION, POWDER, LYOPHILIZED, FOR SOLUTION INTRAVENOUS at 16:59

## 2020-05-21 RX ADMIN — HEPARIN SODIUM 5000 UNITS: 5000 INJECTION, SOLUTION INTRAVENOUS; SUBCUTANEOUS at 05:06

## 2020-05-21 RX ADMIN — TERBUTALINE SULFATE 10 MG: 5 TABLET ORAL at 20:05

## 2020-05-21 RX ADMIN — PIPERACILLIN AND TAZOBACTAM 2.25 G: 2; .25 INJECTION, POWDER, FOR SOLUTION INTRAVENOUS at 07:40

## 2020-05-21 RX ADMIN — HUMAN INSULIN 9 UNITS/HR: 100 INJECTION, SOLUTION SUBCUTANEOUS at 21:51

## 2020-05-21 RX ADMIN — PIPERACILLIN AND TAZOBACTAM 2.25 G: 2; .25 INJECTION, POWDER, FOR SOLUTION INTRAVENOUS at 02:02

## 2020-05-21 RX ADMIN — MYCOPHENOLATE MOFETIL 1500 MG: 200 POWDER, FOR SUSPENSION ORAL at 17:44

## 2020-05-21 ASSESSMENT — ACTIVITIES OF DAILY LIVING (ADL)
ADLS_ACUITY_SCORE: 13
ADLS_ACUITY_SCORE: 15

## 2020-05-21 ASSESSMENT — MIFFLIN-ST. JEOR: SCORE: 1644

## 2020-05-21 NOTE — PROGRESS NOTES
Transplant Social Work Services Progress Note      Date of Initial Social Work Evaluation: 7/17/2018, 5/18/2020  Collaborated with: Pt and pt's brother, Romeo, via phone ([phone])    Data: Pt is s/p heart transplant and LVAD removal on 5/18/2020. Pt extubated on 5/19/20 and transferred to  on 5/21/2020. SW provided supportive check-in with pt's brother, via phone, and w/ pt, upon arrival to . Writer reviewed caregiver support plan following hospital discharge w/ pt and brother. SW working with pt and brother on getting them into the [place] apartment potentially mid to late next week, pending medical readiness. Pt working with therapies and anticipated pt will progress to being able to discharge directly to the apartment. SW assisting in obtaining financial support for the apartment.     Intervention: Supportive Visit/Phone Call, discharge planning   Assessment: Pt in good spirits and pleased with his progress thus far. Pt and brother are familiar with relocation and the [place] apartment from when pt had to stay there post-LVAD implant. Brother expressing no concerns with caregiver plan and being down here for pt.   Education provided by SW: Ongoing Social Work support, discharge planning   Plan:    Discharge Plans in Progress: Anticipated that pt will progress to safely being able to discharge directly to the apartment. Pt is also open to rehab if this should be recommended.     Barriers to d/c plan: Medical Readiness    Follow up Plan: SW to continue to follow.

## 2020-05-21 NOTE — PLAN OF CARE
Discharge Planner OT   Patient plan for discharge: home   Current status: Pt completed bed mobility and sit > stand transfers with CGA. Pt completed transfer to chair with CGA. Pt tolerated g/h tasks and toileting in standing with x2 seated rest breaks. Pt on RA O2 sats >93% HR 90s BP 120s/80s.   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 Pt currently limited by post surgical precautions and deconditioning leading to decreased tolerance for ADLs and mobility.        Entered by: Licha Silva 05/21/2020 1:14 PM

## 2020-05-21 NOTE — OP NOTE
Procedure Date: 05/18/2020      PREOPERATIVE DIAGNOSIS:  Dilated cardiomyopathy, status post HeartMate III left ventricular assist device.      POSTOPERATIVE DIAGNOSIS:  Dilated cardiomyopathy, status post HeartMate III left ventricular assist device.      PROCEDURES:   1.  Redo median sternotomy.   2.  HeartMate III left ventricular assist device explant (intracorporeal left ventricular assist device).   3.  Orthotopic heart transplantation.   4.  Removal of AICD.      SURGEON:  Elder Willis MD      CO-SURGEON:  Kamaljit Baker MD      NOTE:  A second attending surgeon was requested for the operation because of the complexity of the operation, as the patient had a previous sternotomy.      ASSISTANT:  Brenda Guevara MD       NOTE:  Please refer to full details by Dr. Willis, who was the primary surgeon.      DESCRIPTION OF OPERATION:  The patient was brought to the operating room in stable condition with emergent anesthesia.  The patient's chest, abdomen and lower extremities were prepped and draped in the usual manner.  A redo median sternotomy was performed with the oscillating saw.  Adhesions were carefully dissected to expose the aorta, superior vena cava, inferior vena cava, anastomosis of the left ventricle and the pump.  Cardiopulmonary bypass was instituted in the usual standard fashion after ACT-guided heparinization and aortic and bicaval cannulation.  Aortic pressure was temporarily reduced, and the aorta was cross clamped.  The heart was excised leaving band cuffs of left atrium, superior vena cava, inferior vena cava, aorta and pulmonary artery, and the LV vent was also explanted in its entirety.  The donor heart was examined and found to have no abnormalities.  Orthotopic heart transplantation was completed in the usual standard fashion.  The cross clamp was released.  Organized rhythm resumed without need for multiple defibrillations.  Rewarming and reperfusion were allowed.  De-airing was  confirmed by echocardiography.  The patient gradually weaned off cardiopulmonary bypass for internal cardiopulmonary bypass.  Biventricular function was within normal limits.  Careful hemostasis was obtained.  Appropriate chest tubes and pacing wires were placed.  The sternum was closed with surgical steel wires.  Fascia, subcutaneous tissue and skin of chest were closed in layers.  Left infraclavicular explant incision was made.  The AICD was removed.  The wound was closed in layers.  The patient was transferred to ICU in stable condition.        NOTE:  I was co-surgeon on specific parts of the operation.         CONSTANTIN CARTER MD             D: 2020   T: 2020   MT: ally      Name:     ADILSON RINCON   MRN:      [id number]        Account:        [id number]   :      1960           Procedure Date: 2020      Document: [id number]       cc: [facility] Billing

## 2020-05-21 NOTE — PLAN OF CARE
Major Shift Events: AMRIT Q4H, see flowsheet for values. 100% DDD paced at 100. -140s. 2L NC needed when asleep, RA when awake. Minimal output from chest tubes. BM x4. Denies pain.   Plan: Remove Raymond. Transfer to floor.   For vital signs and complete assessments, please see documentation flowsheets.

## 2020-05-21 NOTE — PROGRESS NOTES
Donor Culture Results:     Final positive donor blood/sputum culture results have been uploaded into [facility]. Donor ID [id number].  Dr. Nielson notified of results.

## 2020-05-21 NOTE — PROGRESS NOTES
Transfer Report    Transferred from:   Via: WC  Reason for transfer: Improving in medical condition  Family: Notified by patient  Belongings: With patient  Chart: With Patient  Medications: Transferred to patients bin  Report called from: GERMAN Whittaker  2 person skin check completed with Etelvina Teague

## 2020-05-21 NOTE — PLAN OF CARE
6C Discharge Planner PT   Patient plan for discharge: not discussed today  Current status: VSS on RA. Pt with external pacemaker. Supine > EOB, SBA. STSs/transfers, SBA. Pt IND remembered to follow sternal precautions. Ambulated ~ 250 ft with FWW progressed to no assistive device, SBA. 1 x seated rest break. Pt steady on feet. Recs up SBA.     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         Entered by: Lin Lisa 05/21/2020 4:36 PM

## 2020-05-21 NOTE — PROGRESS NOTES
CVTS Progress Note  May 21, 2020    Mariusz Sharp  [id number]  Admitted: 5/17/2020 11:24 PM      CO-MORBIDITIES:   Biventricular heart failure (H)  (primary encounter diagnosis)    ASSESSMENT: 59 year old male with significant PMHx of CAD, HTN, h/o MI s/p stent 2019 with ALYSSA with ICM with LVAD HeartMate III implant, ( EF 20-25%) taking blood thinners, chronic anemia, CKD, D2M on insulin s/p heart transplant, LVAD removal and ICD placement on 5/18/2020 with Dr. Willis, he was extubated on POD#1 on 5/19/2020.     TODAY'S PROGRESS:   - Continue SQH  - Continue HDSSI  - Start lantus 24/day (PTA is 35)  - Endocrinology consult  - Transplant ID consulted for Donor of heart who was positive on blood and sputum cultures  - Discontinue Isoprotenerol  - Pending heart biopsy, keep CT in place until then  - Transfer to floor     Neurological:  #Sedation   - Monitor neurological status. Delirium preventions and precautions.   - Pain: Tylenol, PRN hydromorphone, Neurontin 100 TID, Melatonin  - HELD PTA pain regimen: PTA oxycodone 20 mg TID, gabapentin 600 mg TID, topical diclofenac PRN, flexeril PRN            - Sedation plan: n/a RASS Awake     Pulmonary:   # COVID negative on 5/18  # Extubated on 5/19/2020  - HOB elevation   - Supplemental oxygen to keep saturation above 92 %.      Cardiovascular:    # HTN  # CAD   #syncope with coughing  #RV dysfunction  - Monitor hemodynamic status.   - Epinephrine infusion, Isoproterenol infusion  - HELD  PTA meds Carvedilol, hydralazine, digoxin  - Cap Pacer wires     Gastroenterology/Nutrition:  OG tube to suction  - Bowel regimen  - Regular diet, advance as tolerated  - MDSSI     Fluids/Electrolytes:   Electrolyte protocol in place     Renal:  #CKD  - monitor UOP, follow electrolyte lab  - Remove duarte        Endocrine:  #Stress/Steroid induced hyperglycemia  - HDSSI  - Lantus  - Follow Endocrinology rec's     ID:  # Immunosuppression per cardiology  # Infectious prophylaxis  #  Heart Donor positive cultures  - Donor Blood culture positive for Strep  - Donor sputum culture positive for H. Influenza, Staph A.  - Transplant ID consulted, appreciated recs     Heme:     #Chronic Anemia     Musculoskeletal:  # No issues      Skin:  # No issues     General Cares/Prophylaxis:    DVT Prophylaxis: Pneumatic Compression Devices, SQH  GI Prophylaxis: PPI, SQH  Restraints: none     Lines/ tubes/ drains:  - PIV, CT's     Disposition:  - Transfer to Floor     Patient seen, findings and plan discussed with CVTS Fellow    Milad Montalvo MD CA-2, PGY-3  Anesthesiology Resident  CVICU Resident    ====================================    TODAY'S PROGRESS:   SUBJECTIVE:   - NAEO, patient doing well, is feeling well.        OBJECTIVE:   1. VITAL SIGNS:   Temp:  [97.9  F (36.6  C)-98.8  F (37.1  C)] 97.9  F (36.6  C)  Heart Rate:  [] 91  Resp:  [11-23] 14  BP: (119)/(84) 119/84  MAP:  [74 mmHg-119 mmHg] 98 mmHg  Arterial Line BP: (102-164)/(59-91) 137/77  SpO2:  [91 %-99 %] 94 %  Resp: 14      2. INTAKE/ OUTPUT:   I/O last 3 completed shifts:  In: 996.18 [P.O.:470; I.V.:526.18]  Out: 3220 [Urine:2850; Chest Tube:370]    3. PHYSICAL EXAMINATION:   Neuro: Lying on bed, extubated and alert  Cardio/Resp: Equal chest rise, breath sounds equal  Regular heart rhythm, tachycardia present  Chest tubes with small volume bloody output  Drains:: Incision dressing clean and dry  GI: Abdomen soft, non-distended  MSK: Extremities normal, distal perfusion in tact  Skin: warm and dry, no rashes    4. INVESTIGATIONS:   Arterial Blood Gases   Recent Labs   Lab 05/19/20  0943 05/19/20  0812 05/19/20  0324 05/19/20  0009   PH 7.40 7.40 7.39 7.35   PCO2 27* 33* 38 39   PO2 131* 124* 132* 153*   HCO3 17* 21 23 21     Complete Blood Count   Recent Labs   Lab 05/21/20  1028 05/21/20  0444 05/20/20  2137 05/20/20  1606   WBC 10.6 10.0 10.7 10.6   HGB 7.7* 7.6* 7.5* 7.8*    166 154 151     Basic Metabolic Panel  Recent Labs    Lab 05/21/20  1028 05/21/20  0444 05/20/20  2137 05/20/20  1606    138 135 136   POTASSIUM 4.0 3.9 4.0 3.8   CHLORIDE 107 107 103 104   CO2 21 23 25 22   BUN 42* 48* 50* 48*   CR 1.80* 2.10* 2.42* 2.44*   * 235* 266* 244*     Liver Function Tests  Recent Labs   Lab 05/19/20  0943 05/18/20  1835 05/18/20  1630 05/18/20  1248 05/18/20  0857 05/18/20  0010   AST 98* 112*  --   --  25 30   ALT 26 30  --   --  32 33   ALKPHOS 27* 34*  --   --  54 51   BILITOTAL 1.0 1.5*  --   --  1.0 0.7   ALBUMIN 3.0* 2.8*  --   --  3.4 3.4   INR  --  1.54* 2.21* 1.42*  --  2.19*     Pancreatic Enzymes  Recent Labs   Lab 05/18/20  0857 05/18/20  0010   AMYLASE 56 65     Coagulation Profile  Recent Labs   Lab 05/18/20  1835 05/18/20  1630 05/18/20  1248 05/18/20  0010   INR 1.54* 2.21* 1.42* 2.19*   PTT 31 31 34 38*     Lactate  Invalid input(s): LACTATE    5. RADIOLOGY:   Recent Results (from the past 24 hour(s))   Cardiac Device Check - Inpatient   Result Value    Date Time Interrogation Session [id number]    Implantable Pulse Generator  [employer]    Implantable Pulse Generator Model D151 DYNAGEN    Implantable Pulse Generator Serial Number [id number]    Type Interrogation Session In Clinic    Clinic Name [facility] [place]    Implantable Pulse Generator Type Defibrillator    Implantable Pulse Generator Implant Date 20180703    Implantable Lead  [employer]    Implantable Lead Model 7742 Ingevity MRI    Implantable Lead Serial Number [id number]    Implantable Lead Implant Date 20180703    Implantable Lead Polarity Type Bipolar Lead    Implantable Lead Location Detail 1 UNKNOWN    Implantable Lead Special Function IS-1 Bipolar    Implantable Lead Location Right Ventricle    Implantable Lead  Guidant    Implantable Lead Model 0180 [employer] [place] SG    Implantable Lead Serial Number [id number]    Implantable Lead Implant Date 20180703    Implantable Lead Polarity  Type Bipolar Lead    Implantable Lead Location Detail 1 UNKNOWN    Implantable Lead Special Function Endo-Distal    Implantable Lead Location Right Ventricle    James Setting Mode (NBG Code) VVI    James Setting Lower Rate Limit 40    James Setting Hysterisis Rate Off    Lead Channel Setting Sensing Polarity Bipolar    Lead Channel Setting Sensing Sensitivity 0.6    Lead Channel Setting Sensing Adaptation Mode Adaptive    Lead Channel Setting Pacing Polarity Bipolar    Lead Channel Setting Pacing Pulse Width 1.0    Lead Channel Setting Pacing Amplitude 2.2    Lead Channel Setting Pacing Capture Mode Fixed Pacing    Zone Setting Type Category VF    Zone Setting Vendor Type Category VF    Zone Setting Detection Interval 300.0    Zone Setting Type Category VT    Zone Setting Vendor Type Category VT    Zone Setting Detection Interval 375.0    Zone Setting Type Category VT    Zone Setting Vendor Type Category VT-1    Lead Channel Impedance Value 969.0    Lead Channel Sensing Intrinsic Amplitude 19.8    Lead Channel Pacing Threshold Amplitude 0.6000    Lead Channel Pacing Threshold Pulse Width 1.0    Battery Date Time of Measurements 87653319195133    Battery Status Beginning of Service    Battery Remaining Longevity 144    Capacitor Charge Type Reformation    Capacitor Last Charge Date Time 67285539837180    Capacitor Charge Time 9.563    Capacitor Charge Type Shock    Capacitor Charge Time 8.395    Capacitor Charge Energy 41.0    James Statistic Date Time Start 00599579518851    James Statistic Date Time End 47321848242726    James Statistic RV Percent Paced 1    Therapy Statistic Total Shocks Delivered 5    Therapy Statistic Total Shocks Aborted 0    Therapy Statistic Total ATP Delivered 9    Therapy Statistic Total  Date Time End 22643932885245    Therapy Statistic Recent Shocks Delivered 0    Therapy Statistic Recent Shocks Aborted 0    Therapy Statistic Recent ATP Delivered 0    Therapy Statistic Recent Date Time  Start 20191204090301    Therapy Statistic Recent Date Time End 20200518000000    Episode Statistic Total Count 49    Episode Statistic Type Category VT    Episode Statistic Vendor Type Category NSVT    Episode Statistic Total Count 6    Episode Statistic Type Category VF    Episode Statistic Vendor Type Category VF    Episode Statistic Total Count 0    Episode Statistic Type Category VF_VT_MONITOR    Episode Statistic Total Date Time End 20200518000000    Episode Statistic Total Date Time End 20200518000000    Episode Statistic Total Date Time End 20200518000000    Episode Statistic Recent Count 0    Episode Statistic Type Category VT    Episode Statistic Vendor Type Category NSVT    Episode Statistic Recent Count 0    Episode Statistic Type Category VF    Episode Statistic Vendor Type Category VF    Episode Statistic Recent Count 0    Episode Statistic Type Category VF_VT_MONITOR    Episode Statistic Recent Date Time Start 20191204090301    Episode Statistic Recent Date Time End 20200518000000    Episode Statistic Recent Date Time Start 20191204090301    Episode Statistic Recent Date Time End 20200518000000    Episode Statistic Recent Date Time Start 20191204090301    Episode Statistic Recent Date Time End 20200518000000    Narrative    Patient seen on 3 C  for evaluation and iterative programming of a [employer] Single lead ICD per MD orders. Patient is scheduled for a heart transplant today. Normal ICD function. No episodes/arrhythmias recorded. Intrinsic rhythm = Regular VS @ 64 bpm.  = <1%. Estimated battery longevity to QUIQUE = 12 years. Lead trends appear stable. Patient reports that he is feeling well and denies complaints. Per Anesthesia orders Tachy therapies programmed OFF. LESTER Bryant RN, BSN.     Single lead ICD    I have reviewed and interpreted the device interrogation, settings, programming and nurse's summary. The device is functioning within normal device parameters. I agree with the current  findings, assessment and plan.   XR Chest Port 1 View    Narrative    EXAMINATION:  XR CHEST PORT 1 VW 5/18/2020 6:39 PM.    COMPARISON: 5/18/2020 at 0008 hours.    HISTORY:  s/p heart txp    FINDINGS: Portable AP view of the chest. Postsurgical changes of heart  transplant. Endotracheal tube tip projects over the mid thoracic  trachea. Right IJ Easton-Calvin catheter tip projects over the pulmonary  trunk. Bibasilar chest tubes and 2 mediastinal drains are present.  Epicardial pacer wires are noted. Intact median sternotomy wires.  Retained catheter in the left upper chest at the site of prior  pacemaker.    Midline trachea. Cardiomediastinal silhouette is within normal limits.  Pulmonary vasculature is distinct. Interstitial prominence suggestive  of pulmonary vascular congestion. Trace bilateral pleural effusions.  No appreciable pneumothorax. Upper abdomen is unremarkable.      Impression    IMPRESSION:   1. Postsurgical changes of cardiac transplant with support devices in  appropriate position.  2. Mild pulmonary vascular congestion.  3. Retained catheter in the left upper chest at the site of prior  pacemaker.    I have personally reviewed the examination and initial interpretation  and I agree with the findings.    HIPOLITO DEL ANGEL MD   XR Abdomen Port 1 View    Narrative    XR ABDOMEN PORT 1 VW  5/18/2020 10:17 PM    History:  OG placement.     Comparison: CT abdominal/pelvis dated 5/12/2020    Findings:   50 degrees AP abdominal radiograph. Gastric tube with the tip and  side-port projects over stomach. Nonobstructive bowel gas pattern.  Gallstones projects over right upper quadrant.    Partially visualized Easton-Calvin catheter, bilateral chest tubes and  mediastinal drainage tube. Mild degenerative changes of thoracolumbar  spine.      Impression    IMPRESSION:  1. Gastric tube with the tip and side-port projects over stomach.  Nonobstructive bowel gas pattern.  2. Cholelithiasis.    I have personally  reviewed the examination and initial interpretation  and I agree with the findings.    JHOANA KHAN MD   XR Chest Port 1 View   Result Value    Radiologist flags Low Endotracheal tube tip (Urgent)    Narrative    EXAM: XR CHEST PORT 1 VW  5/19/2020 8:08 AM      HISTORY: sg catheter monitoring    COMPARISON: Chest x-ray from 5/18/2020 and 7/7/2019    FINDINGS: Semiupright portable AP radiograph of the chest.  Postsurgical changes of cardiac transplantation. The enteric tube  courses inferior to the diaphragm and out of the field of view. The  tip of the endotracheal tube not well visualized, but is likely low,  approximately 1 cm above the kris. Right IJ [place]-Calvin catheter with  tip projecting over the pulmonary trunk. Bilateral chest tubes and 3  mediastinal drains with stable positioning. Catheter again seen  projecting over the left axillary region.     Trachea is midline. The cardiac silhouette is unchanged. Mild  bilateral perihilar and basilar interstitial opacities. Small  bilateral pleural effusions. No pneumothorax. No acute osseous or  abdominal abnormality.      Impression    IMPRESSION:    1. The tip of the right IJ [place]-Calvin catheter projects over the  pulmonary trunk.  2. The tip of the endotracheal tube is not well visualized, but is  likely low, approximately 1 cm above the kris.  3. The remaining support devices are stable in positioning.  4. Mild bilateral perihilar and basilar interstitial opacities, likely  pulmonary edema versus atelectasis.  5. Postsurgical changes of cardiac transplantation.    Findings discussed with the senior radiology resident, Dr. Sahil Mirza.    [Urgent Result: Low Endotracheal tube tip]    Finding was identified on 5/19/2020 8:20 AM.     The ordering provider, Dr. Katerine Harper, was contacted by Dr. Tayler Oseguera at 5/19/2020 9:52 AM and verbalized understanding of the  urgent finding.     I have personally reviewed the examination and initial  interpretation  and I agree with the findings.    MAGGIE FONTENOT MD       =========================================

## 2020-05-21 NOTE — PROGRESS NOTES
Cardiology Consult Progress Note     ASSESSMENT:   Mariusz Sharp is a 59 year old male history of CAD (s/p STEMI with ALYSSA to LAD 6/27/18), LV thrombus, CKD Stage III, PAF, DM Type II, and ICM with EF 20-25% s/p HeartMate 3 LVAD implant 12/31/18 who presented for OHT 5/18/2020    Plan today:   - Continue terbutaline   - Start Valcyte 450mg po every other day   - Plan for basiliximab and bactrim tomorrow   - SOT ID consult given donor positive cultures   - Remove PA catheter and duarte   - RHC/Biopsy on Tuesday 5/26     Pressors: none  Other drips: none  Paced at VVI 90  - Give lasix 20mg IV x1      Serostatus: Donor unknown  CMV: D+, R-, EBV: D- R+, Toxo D- R-  Blood type: A     Immunosuppression:  -Calcineurin Inhibitor: due to renal dysfunction, anti-IL-2 given - Basiliximab 20 mg IV, second dose due 5/22; tacrolimus to follow pending renal function with goal level 10-15 for first 3 months  -Cell cycle Inhibitor: start Cellcept 1500 mg BID (generic 1500 mg given preoperatively)  -Steroid: methylprednisolone 1 gram given preoperatively, followed by 500 mg IV at release of cross clamp; start 125 mg IV q8 hours x 3 doses, followed by prednisone 1 mg/kg daily (in BID dosing, taper 5 mg daily)  -Endomyocardial biopsy: first 5/26; weekly 4 weeks, bi-weekly 2 months, monthly 3-6 months     Infection Prophylaxis:  -PCP/Toxo: Bactrim holding for now given renal function   -Thrush: Nystatin  -CMV (D+/R-): Valcyte 450 every other day; 6 months total   -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         Physical Exam   Temp: 97.9  F (36.6  C) Temp src: Oral BP: 119/84   Heart Rate: 91 Resp: 14 SpO2: 94 % O2 Device: None (Room air) Oxygen Delivery: 2  LPM  Vital Signs with Ranges  Temp:  [97.9  F (36.6  C)-98.8  F (37.1  C)] 97.9  F (36.6  C)  Heart Rate:  [] 91  Resp:  [11-23] 14  BP: (119)/(84) 119/84  MAP:  [74 mmHg-119 mmHg] 98 mmHg  Arterial Line BP: (102-164)/(59-91) 137/77  SpO2:  [91 %-99 %] 94 %  200 lbs 2.84 oz    GEN: NAD, pleasant  HEENT: no icterus  CV: RRR, normal s1/s2, no murmurs/rubs/s3/s4, no heave. +PA catheter.   CHEST: CTAB, midline surgical scar   ABD: soft, NT/ND, NABS  : no flank/suprapubic tenderness  NEURO: AA&Ox3, fluent/appropriate, motor grossly nonfocal  PSYCH: cooperative, affect appropriate       Data   Recent Labs   Lab 05/21/20  1028 05/21/20  0444 05/20/20  2137  05/19/20  0943  05/18/20  1835 05/18/20  1630  05/18/20  1248   WBC 10.6 10.0 10.7   < > 16.8*   < > 20.1*  --   --   --    HGB 7.7* 7.6* 7.5*   < > 8.0*   < > 9.6* 8.9*   < >  --    MCV 83 83 83   < > 84   < > 82  --   --   --     166 154   < > 192   < > 192 185  --   --    INR  --   --   --   --   --   --  1.54* 2.21*  --  1.42*    138 135   < > 141   < > 141 142   < >  --    POTASSIUM 4.0 3.9 4.0   < > 4.4   < > 3.7 3.8   < >  --    CHLORIDE 107 107 103   < > 112*   < > 109  --   --   --    CO2 21 23 25   < > 21   < > 24  --   --   --    BUN 42* 48* 50*   < > 36*   < > 39*  --   --   --    CR 1.80* 2.10* 2.42*   < > 2.40*   < > 1.90*  --   --   --    ANIONGAP 9 8 7   < > 8   < > 8  --   --   --    ARLENE 7.4* 7.6* 8.0*   < > 8.4*   < > 8.1*  --   --   --    * 235* 266*   < > 128*   < > 115* 179*   < >  --    ALBUMIN  --   --   --   --  3.0*  --  2.8*  --   --   --    PROTTOTAL  --   --   --   --  5.4*  --  5.4*  --   --   --    BILITOTAL  --   --   --   --  1.0  --  1.5*  --   --   --    ALKPHOS  --   --   --   --  27*  --  34*  --   --   --    ALT  --   --   --   --  26  --  30  --   --   --    AST  --   --   --   --  98*  --  112*  --   --   --     < > = values in this interval not displayed.       Recent Results (from the past 24 hour(s))    XR Chest Port 1 View    Narrative    EXAM: XR CHEST PORT 1 VW  5/21/2020 8:11 AM      HISTORY: POD#3 s/p cardiac transplant. Line assessment.    COMPARISON: Chest x-ray from 5/20/2020 and 5/19/2020.    FINDINGS: Semiupright portable AP radiograph of the chest.  Postsurgical changes of cardiac transplantation. Median sternotomy  wires are intact. Right IJ [place]-Calvin catheter with tip projecting over  the main pulmonary artery. Bibasilar chest tubes and 3 mediastinal  drains have stable positioning. Catheter tubing projecting over the  left axillary region.    Trachea is midline, the cardiac silhouette remains enlarged. No  pneumothorax or focal airspace opacity. Trace left pleural effusion.  No acute osseous or abdominal abnormality.      Impression    IMPRESSION:  1. Postsurgical changes of cardiac transplantation. Stable positioning  of the support devices.  2. Trace left pleural effusion.    Findings discussed with the senior radiology resident, Dr. Sahil Mirza.    I have personally reviewed the examination and initial interpretation  and I agree with the findings.    ALICIA CEJA MD

## 2020-05-21 NOTE — PROGRESS NOTES
CLINICAL NUTRITION SERVICES    Reason for Assessment:  Post-Tx nutrition education     Diet History:  Per pt, has not received formal nutrition education on post-Tx guidelines in the recent past. Pt reports good appetite. Has received low sodium diet education in the past, but does not always follow.     Interventions:  Nutrition Education - Provided verbal and written nutrition education on post-Tx nutrition guidelines.  Nutrition education included need for increased protein and kcal needs post-op, food safety, and heart-healthy eating in the long-term.  Handout given:  Post-Tx Nutrition Guidelines, Protein sources (goal of  g protein/day based on adjusted dosing weight of 66 kg).     Goals:   Patient will verbalize at least three nutrition-related aspects of post-Tx diet guidelines.    Follow-up:    Patient to ask any further nutrition-related questions before discharge.  In addition, pt may request outpatient RD appointment.    Natasha Richardson RD, LD  [id number]  Pgr: [phone]

## 2020-05-21 NOTE — CONSULTS
[facility]    Transplant Infectious Diseases Inpatient Consultation      Mariusz Sharp MRN# [id number]   YOB: 1960 Age: 59 year old   Date of Admission and time: 5/17/2020 11:24 PM     Reason for consult: I was asked by ICU team to evaluate this patient for donor with positive sputum and blood cultures.             Recommendations:   1. Once the course of perioperative antibiotics is concluded switch from vancomycin/zosyn to ceftriaxone IV 2 grams daily till 6/1/2020 (cefpodoxime PO adjusted to kidney function when you feel comfortable) and flagyl 500 mg tid PO till 6/1/2020.    2. CMV and PJP ppx per your protocol.         Summary of Presentation:   Transplants:  5/18/2020 (Heart), Postoperative day:  3     This patient is a 59 year old male with ICMP s/p OHT basiliximab for induction, MMF/prednisone as of now for maintenance IS.   Donor with positive blood and respiratory cx.         Active Problems and Infectious Diseases Issues:   1. At risk for donor-derived infection.   The donor blood cx grew S anginosus and Veillonella sp.   The S anginosus is usually susceptible to cephalosporins and Veillonella to flagyl. I would use these 2 agents for total duration of ABx (accounting for vancomycin and zosyn).     The S aureus and the H influenza in the sputum of the donor are of no clinical value in this recipient of the donor's heart.          Old Problems and Infectious Diseases Issues:   None.     Other Infectious Disease issues include:  - PCP prophylaxis: per your protocol.   - Serostatus: CMV D+/R-, EBV D-/R+, HSV1?/2?, VZV +, Toxo D-/R-  - Immunization status: Too early to be discussed.   - Gamma globulin status: ?      Thank you very much for involving me in the care of Mr. Mariusz Sharp. Please do not hesitate to call me for any question.     Attestation:  I interviewed the patient and obtained history from the patient, and by reviewing the patient's chart including  outside records, microbiological data, and radiological data. All data are summarized in this notes.  Tavares Rodriguez MD   Pager: [phone]  05/21/2020             History of Present Illness:   Transplants:  5/18/2020 (Heart), Postoperative day:  3     This patient is a 59 year old male with ICMP admitted to undergo OHT with no complications.   The early post-operative course is not eventful. The patient was extubated within 24 hr.   The patient has no complaints except for diarrhea induced by senna.     The donor blood cx grew S anginosus and Veillonella. And the sputum grew H influenza and S aureus.     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   He lives in [place] in a house with his father who has dementia.   He fishes.   His last travel outside of [place] was to [facility] in 2019.   No institutionalization.                 Past Medical History:     Past Medical History:   Diagnosis Date     Acute kidney injury (H)      CKD (chronic kidney disease)      Coronary artery disease      Diabetes mellitus (H)      HTN (hypertension)      Hyperlipidemia      Ischemic cardiomyopathy      LV (left ventricular) mural thrombus      Paroxysmal atrial flutter (H)      RVF (right ventricular failure) (H)      Systolic heart failure (H)      Ventricular tachycardia (H)             Past Surgical History:     Past Surgical History:   Procedure Laterality Date     COLONOSCOPY N/A 12/7/2018    Procedure: COLONOSCOPY;  Surgeon: Krish Mercado MD;  Location: [facility] OR     CV RIGHT HEART CATH N/A 2/19/2019    Procedure: RHC;  Surgeon: Brian Long MD;  Location: [facility] HEART CARD[facility]AC CATH LAB     CV RIGHT HEART CATH N/A 7/5/2019    Procedure: CV RIGHT HEART CATH;  Surgeon: Khris Mcclelland MD;  [facility]     HC PRQ TRLUML CORONARY ANGIOPLASTY ADDL BRANCH       PCI and ALYSSA to ostial LAD on 6/27/18     IMPLANT AUTOMATIC IMPLANTABLE CARDIOVERTER DEFIBRILLATOR       INSERT VENTRICULAR ASSIST DEVICE LEFT (HEARTMATE II) N/A 12/31/2018    Procedure: Median Sternotomy, On Cardiopulmonary Bypass Pump, Insertion of Left Ventricular Assist Device (Heartmate III);  Surgeon: Kamaljit Baker MD;  Location: [facility] OR     TRANSPLANT HEART RECIPIENT AFTER HEART MATE N/A 5/18/2020    Procedure: REDO MEDIAN STERNOTOMY, LYSIS OF ADHESIONS, ON CARDIOPULMONARY BYPASS PUMP, HEART TRANSPLANT RECIPIENT, REMOVAL OF LEFT VENTRICULAR ASSIST DEVICE, REMOVAL OF AUTOMATED IMPLANTABLE CARDIOVERTER DEFIBRILLATOR;  Surgeon: Elder Willis MD;  Location: [facility] OR            Social History:     Social History     Tobacco Use     Smoking status: Never Smoker     Smokeless tobacco: Never Used   Substance Use Topics     Alcohol use: No     Frequency: Never            Family History:   I have reviewed this patient's family history  History reviewed. No pertinent family history.         Immunizations:     Immunization History   Administered Date(s) Administered     Mantoux Tuberculin Skin Test 01/12/2019            Allergies:   No Known Allergies          Medications:   Medications that Require Transfusion:     dextrose       EPINEPHrine IV infusion ADULT Stopped (05/20/20 9278)     isoproterenol (ISUPREL) infusion ADULT Stopped (05/20/20 0657)     BETA BLOCKER NOT PRESCRIBED       Scheduled Medications:     acetaminophen  975 mg Oral Q8H     gabapentin  100 mg Oral or Feeding Tube TID     heparin ANTICOAGULANT  5,000 Units Subcutaneous Q8H     insulin aspart  1-10 Units Subcutaneous TID AC     insulin aspart  1-7 Units Subcutaneous At Bedtime     insulin isophane human  12 Units Subcutaneous QAM     mycophenolate  1,500 mg Oral or Feeding Tube BID IS     nystatin  1,000,000 Units Oral 4x Daily     pantoprazole  40 mg Oral QAM AC     piperacillin-tazobactam  2.25 g Intravenous Q6H     predniSONE  40 mg Oral BID  w/meals    Followed by     [START ON 5/22/2020] predniSONE  35 mg Oral BID w/meals    Followed by     [START ON 5/24/2020] predniSONE  30 mg Oral BID w/meals    Followed by     [START ON 5/26/2020] predniSONE  25 mg Oral BID w/meals    Followed by     [START ON 5/28/2020] predniSONE  20 mg Oral BID w/meals     senna-docusate  1 tablet Oral BID    Or     senna-docusate  2 tablet Oral BID     sodium chloride (PF)  3 mL Intracatheter Q8H     terbutaline  10 mg Oral Q8H CHANCE     vancomycin place tai - receiving intermittent dosing  1 each Does not apply See Admin Instructions               Review of Systems:      As mentioned in the HPI otherwise negative by reviewing constitutional symptoms, central and peripheral neurological systems, respiratory system, cardiac system, GI system,  system, musculoskeletal, skin, allergy, and lymphatics.                  Physical Exam:   Temp: 98.1  F (36.7  C) Temp src: Oral    Heart Rate: 99 Resp: 11 SpO2: 96 % O2 Device: Nasal cannula Oxygen Delivery: 2 LPM    Wt Readings from Last 4 Encounters:   05/21/20 90.8 kg (200 lb 2.8 oz)   05/15/20 86.5 kg (190 lb 11.2 oz)   04/29/20 88.9 kg (196 lb)   12/04/19 91.3 kg (201 lb 4.8 oz)     Constitutional: awake, alert, cooperative, no apparent distress and appears at stated age, well nourished.   Head, ENT, Eyes, and Neck: Normocephalic, sinuses non-tender to palpation, external ears without lesions, moist buccal mucosa without oral thrush, tonsils without swelling, erythema, or exudate, no tenderness palpating teeth, good dentition, gums without necrosis or abscesses.   PERRL, EOMI, pink conjunctivae, non-icteric sclera.   Neck supple without rigidity, no cervical/axillary/inguinal LA bilaterally.    Neurologic: Patient is moving all extremities without focal deficit, no focal sensory loss.   Lungs: CTA bilaterally, no accessory muscle use, no dullness to percussion and no abnormal tactile fremitus.   CVS: RRR, normal S1/S2, no murmur,  PMI was not displaced.   Abdomen: non-tender, non-distended, no masses, no bruit, no shifting dullness, normal BS.   Genitalia: duarte catheter in place, no lesions were visualized and no tenderness to plapation.   Extremities: no pitting edema of bilateral lower extremities, no ulcers, normal ROM of all joints, no swelling or erythema of any of joints and no tenderness to palpation.   Skin: no induration, fluctuation or discharge at the surgical site, and no rash            Data:   No results found for: ACD4    Inflammatory Markers    Recent Labs   Lab Test 07/23/18  0645   CRP 11.0*       Immune Globulin Studies   No lab results found.    Metabolic Studies       Recent Labs   Lab Test 05/21/20  0444 05/20/20  2137 05/20/20  1606 05/20/20  0918 05/20/20  0338 05/19/20  2230  05/19/20  1203  05/19/20  0812 05/19/20  0324  05/18/20  1835  05/12/20  2215    135 136 136 136 137   < >  --    < >  --  140   < > 141   < > 138   POTASSIUM 3.9 4.0 3.8 4.4 4.4 4.0   < >  --    < >  --  4.2   < > 3.7   < > 4.6   CHLORIDE 107 103 104 104 102 106   < >  --    < >  --  110*   < > 109   < > 107   CO2 23 25 22 22 22 20   < >  --    < >  --  24   < > 24   < > 24   ANIONGAP 8 7 9 9 12 11   < >  --    < >  --  6   < > 8   < > 7   BUN 48* 50* 48* 48* 48* 43*   < >  --    < >  --  38*   < > 39*   < > 46*   CR 2.10* 2.42* 2.44* 2.58* 2.81* 2.72*   < >  --    < >  --  2.12*   < > 1.90*   < > 2.16*   GFRESTIMATED 34* 28* 28* 26* 24* 25*   < >  --    < >  --  33*   < > 38*   < > 33*   * 266* 244* 318* 336* 283*   < >  --    < >  --  164*   < > 115*   < > 174*   A1C  --   --   --   --   --   --   --   --   --   --   --   --   --   --  8.0*   ARLENE 7.6* 8.0* 7.7* 7.7* 7.9* 7.7*   < >  --    < >  --  8.0*   < > 8.1*   < > 8.6   PHOS  --   --   --   --   --   --   --   --   --   --  2.5  --  2.2*   < >  --    MAG  --   --   --   --   --   --   --   --   --   --  2.4*  --  2.8*   < >  --    LACT  --   --   --   --   --   --   --  1.6   --  2.4* 2.0   < > 3.1*   < >  --     < > = values in this interval not displayed.       Hepatic Studies    Recent Labs   Lab Test 05/19/20  0943 05/18/20  1835 05/18/20  0857 05/18/20  0010 05/11/20 03/21/20   BILITOTAL 1.0 1.5* 1.0 0.7 0.6 1.1   ALKPHOS 27* 34* 54 51 55 49   ALBUMIN 3.0* 2.8* 3.4 3.4 4.1 4.4   AST 98* 112* 25 30 26 33   ALT 26 30 32 33 25 28   LDH  --   --   --   --  139 152       Pancreatitis testing    Recent Labs   Lab Test 05/18/20  0857 05/18/20  0010 08/06/18  1025   AMYLASE 56 65  --    TRIG  --   --  246*       Hematology Studies      Recent Labs   Lab Test 05/21/20  0444 05/20/20  2137 05/20/20  1606 05/20/20  0918 05/20/20  0338 05/19/20  2230  05/18/20  0857 05/18/20  0010  07/08/19  0327 07/07/19  0349 07/06/19  0355 07/05/19  1453   WBC 10.0 10.7 10.6 11.2* 15.8* 17.7*   < > 6.2 7.5   < > 5.9 6.5 5.9 5.6   ANEU  --   --   --   --   --   --   --  5.4 4.9  --  3.5 4.0 3.9 3.5   ALYM  --   --   --   --   --   --   --  0.7* 1.4  --  1.2 1.1 0.9 1.1   MARTHA  --   --   --   --   --   --   --  0.1 0.8  --  0.7 0.8 0.7 0.8   AEOS  --   --   --   --   --   --   --  0.0 0.3  --  0.4 0.4 0.2 0.2   HGB 7.6* 7.5* 7.8* 7.4* 7.6* 8.2*   < > 12.0* 11.1*   < > 13.0* 12.1* 12.2* 12.2*   HCT 24.4* 24.5* 24.7* 23.9* 24.5* 26.4*   < > 39.2* 36.7*   < > 41.4 38.9* 39.5* 39.1*    154 151 149* 162 181   < > 251 243   < > 206 189 200 199    < > = values in this interval not displayed.       Clotting Studies    Recent Labs   Lab Test 05/18/20  1835 05/18/20  1630 05/18/20  1248 05/18/20  0010   INR 1.54* 2.21* 1.42* 2.19*   PTT 31 31 34 38*       Arterial Blood Gas Testing    Recent Labs   Lab Test 05/21/20  0756 05/21/20  0445 05/20/20  2340 05/20/20 2002 05/19/20  0943 05/19/20  0812 05/19/20  0324 05/19/20  0009  05/18/20  1835   PH  --   --   --   --   --  7.40 7.40 7.39 7.35  --  7.43   PCO2  --   --   --   --   --  27* 33* 38 39  --  33*   PO2  --   --   --   --   --  131* 124* 132* 153*  --   323*   HCO3  --   --   --   --   --  17* 21 23 21  --  22   O2PER 2L 2L 2L 21   < > 40 40  40 40  40 40  40   < > 100    < > = values in this interval not displayed.        Urine Studies     Recent Labs   Lab Test 05/18/20  0135 05/12/20  2250 12/04/19  1641 03/27/19  0910 02/20/19  0820   URINEPH 5.0 5.0 5.0 5.0 5.0   NITRITE Negative Negative Negative Negative Negative   LEUKEST Negative Negative Negative Negative Negative   WBCU 7* 3 15* 3 7*       Vancomycin Levels     Recent Labs   Lab Test 05/20/20  1145   VANCOMYCIN 15.3       Tobramycin levels     No lab results found.    Gentamicin levels    No lab results found.    Tacrolimus levels    Invalid input(s): TACROLIMUS, TAC, TACR  Transplant Immunosuppression Labs Latest Ref Rng & Units 5/21/2020 5/20/2020 5/20/2020 5/20/2020 5/20/2020   Creat 0.66 - 1.25 mg/dL 2.10(H) 2.42(H) 2.44(H) 2.58(H) 2.81(H)   BUN 7 - 30 mg/dL 48(H) 50(H) 48(H) 48(H) 48(H)   WBC 4.0 - 11.0 10e9/L 10.0 10.7 10.6 11.2(H) 15.8(H)   Neutrophil % - - - - -   ANEU 1.6 - 8.3 10e9/L - - - - -       Microbiology:  Blood cx negative to date 5/12/2020  Last check of C difficile  No results found for: CDBPCT    Virology:  CMV viral loads  No results found for: 66605, 08901, 13769, 37726, CMVQAL  CMV viral loads  No lab results found.    CMV viral loads  No results found for: CMVLOG, 17081, 09629, 00067, 58629    CMV resistance testing  No lab results found.  No results found for: CMVCID, CMVFOS, CMVGAN     No results found for: H6RES    No results found for: EBVDN, EBRES, EBVDN, EBVSP, EBVPC, EBVPCR    CMV Antibody IgG   Date Value Ref Range Status   05/18/2020 0.3 0.0 - 0.8 AI Final     Comment:     Negative  Antibody index (AI) values reflect qualitative changes in antibody   concentration that cannot be directly associated with clinical condition or   disease state.         Toxoplasma Antibody IgG   Date Value Ref Range Status   05/18/2020 <3.0 0.0 - 7.1 IU/mL Final     Comment:      Negative- Absence of detectable Toxoplasma gondii IgG antibodies. A negative   result does not rule out acute infection.  The magnitude of the measured result is not indicative of the amount of   antibody present. The concentrations of anti-Toxoplasma gondii IgG in a given   specimen determined with assays from different manufacturers can vary due to   differences in assay methods and reagent specificity.           Imaging:  CXR 5/21/2020 reviewed by myself                                                          IMPRESSION:  1. Postsurgical changes of cardiac transplantation. Stable positioning  of the support devices.  2. Trace left pleural effusion.      Tavares Rodriguez MD  Pager: [phone]  05/21/2020

## 2020-05-21 NOTE — PLAN OF CARE
AOx4, VSS RA, Tele: SR/O Paced, Up w/1 - Pt is moving extremely well on his own and only needs help d/t the tubes and IV's.  Pt is on an insulin gtt and currently on algorithm 1.  5 CT site dressings CDI.  Sternal incision dressing removed and incision site is open to air and WDL.  Driveline site dressing changed 5/21 and repacked d/t excessive drainage.  Pt denies pain/SOB/n/v - is having diarrhea so held stool softeners.  Good productive cough and using IS, Heart biopsy was supposed to be today (held hep injection) but Cath Lab never called.  Probable for tomorrow.  Blood cultures show no blood growth after 6 hours.  Continue to monitor

## 2020-05-21 NOTE — PLAN OF CARE
Pt A&O x 4.  Afebrile.  Temp pacer rate decreased to 90 this morning and then 80 this afternoon.  NSR in the 90's.  BP stable, hypertensive this morning.  Respirations even and unlabored.  O2sats maintained on room air.  Good appetite.  Frequent loose stools, incontinent at times. BG high, endocrine following and adjusting insulin orders. Perez removed, pt voids per urinal without issue.  Lasix given this morning, good UOP.  Up with assist of one.  Repositions independently.  [place] with cordis removed this morning.  A-line removed. BC drawn x 2 2/2 donor hx of bacteremia.  Report given to 6C RN.  Personal belongings packed and sent with pt.  Transported on monitor with float RN.

## 2020-05-21 NOTE — PROGRESS NOTES
CVICU Progress Note  May 21, 2020    Mariusz Sharp  [id number]  Admitted: 5/17/2020 11:24 PM      CO-MORBIDITIES:   Biventricular heart failure (H)  (primary encounter diagnosis)    ASSESSMENT: 59 year old male with significant PMHx of CAD, HTN, h/o MI s/p stent 2019 with ALYSSA with ICM with LVAD HeartMate III implant, ( EF 20-25%) taking blood thinners, chronic anemia, CKD, D2M on insulin s/p heart transplant, LVAD removal and ICD placement on 5/18/2020 with Dr. Willis, he was extubated on POD#1 on 5/19/2020.     TODAY'S PROGRESS:   - Continue SQH  - Continue HDSSI  - Start lantus 24/day (PTA is 35)  - Endocrinology consult  - Transplant ID consulted for Donor of heart who was positive on blood and sputum cultures  - Discontinue Isoprotenerol  - Pending heart biopsy, keep CT in place until then  - Transfer to floor     Neurological:  #Sedation   - Monitor neurological status. Delirium preventions and precautions.   - Pain: Tylenol, PRN hydromorphone, Neurontin 100 TID, Melatonin  - HELD PTA pain regimen: PTA oxycodone 20 mg TID, gabapentin 600 mg TID, topical diclofenac PRN, flexeril PRN            - Sedation plan: n/a RASS Awake     Pulmonary:   # COVID negative on 5/18  # Extubated on 5/19/2020  - HOB elevation   - Supplemental oxygen to keep saturation above 92 %.      Cardiovascular:    # HTN  # CAD   #syncope with coughing  #RV dysfunction  - Monitor hemodynamic status.   - Epinephrine infusion, Isoproterenol infusion  - HELD  PTA meds Carvedilol, hydralazine, digoxin  - Cap Pacer wires     Gastroenterology/Nutrition:  OG tube to suction  - Bowel regimen  - Regular diet, advance as tolerated  - MDSSI     Fluids/Electrolytes:   Electrolyte protocol in place     Renal:  #CKD  - monitor UOP, follow electrolyte lab  - Remove duarte        Endocrine:  #Stress/Steroid induced hyperglycemia  - HDSSI  - Lantus  - Follow Endocrinology rec's     ID:  # Immunosuppression per cardiology  # Infectious prophylaxis  #  Heart Donor positive cultures  - Donor Blood culture positive for Strep  - Donor sputum culture positive for H. Influenza, Staph A.  - Transplant ID consulted, appreciated recs     Heme:     #Chronic Anemia     Musculoskeletal:  # No issues      Skin:  # No issues     General Cares/Prophylaxis:    DVT Prophylaxis: Pneumatic Compression Devices, SQH  GI Prophylaxis: PPI, SQH  Restraints: none     Lines/ tubes/ drains:  - PIV, CT's     Disposition:  - Transfer to Floor     Patient seen, findings and plan discussed with CVICU Attending Dr. Shelby Montalvo MD CA-2, PGY-3  Anesthesiology Resident  CVICU Resident    ====================================    TODAY'S PROGRESS:   SUBJECTIVE:   - NAEO, patient doing well, is feeling well.        OBJECTIVE:   1. VITAL SIGNS:   Temp:  [97.9  F (36.6  C)-98.8  F (37.1  C)] 97.9  F (36.6  C)  Heart Rate:  [] 91  Resp:  [11-23] 14  BP: (119)/(84) 119/84  MAP:  [74 mmHg-119 mmHg] 98 mmHg  Arterial Line BP: (102-164)/(59-91) 137/77  SpO2:  [91 %-99 %] 94 %  Resp: 14      2. INTAKE/ OUTPUT:   I/O last 3 completed shifts:  In: 996.18 [P.O.:470; I.V.:526.18]  Out: 3220 [Urine:2850; Chest Tube:370]    3. PHYSICAL EXAMINATION:   Neuro: Lying on bed, extubated and alert  Cardio/Resp: Equal chest rise, breath sounds equal  Regular heart rhythm, tachycardia present  Chest tubes with small volume bloody output  Drains:: Incision dressing clean and dry  GI: Abdomen soft, non-distended  MSK: Extremities normal, distal perfusion in tact  Skin: warm and dry, no rashes    4. INVESTIGATIONS:   Arterial Blood Gases   Recent Labs   Lab 05/19/20  0943 05/19/20  0812 05/19/20  0324 05/19/20  0009   PH 7.40 7.40 7.39 7.35   PCO2 27* 33* 38 39   PO2 131* 124* 132* 153*   HCO3 17* 21 23 21     Complete Blood Count   Recent Labs   Lab 05/21/20  1028 05/21/20  0444 05/20/20  2137 05/20/20  1606   WBC 10.6 10.0 10.7 10.6   HGB 7.7* 7.6* 7.5* 7.8*    166 154 151     Basic  Metabolic Panel  Recent Labs   Lab 05/21/20  1028 05/21/20  0444 05/20/20  2137 05/20/20  1606    138 135 136   POTASSIUM 4.0 3.9 4.0 3.8   CHLORIDE 107 107 103 104   CO2 21 23 25 22   BUN 42* 48* 50* 48*   CR 1.80* 2.10* 2.42* 2.44*   * 235* 266* 244*     Liver Function Tests  Recent Labs   Lab 05/19/20  0943 05/18/20  1835 05/18/20  1630 05/18/20  1248 05/18/20  0857 05/18/20  0010   AST 98* 112*  --   --  25 30   ALT 26 30  --   --  32 33   ALKPHOS 27* 34*  --   --  54 51   BILITOTAL 1.0 1.5*  --   --  1.0 0.7   ALBUMIN 3.0* 2.8*  --   --  3.4 3.4   INR  --  1.54* 2.21* 1.42*  --  2.19*     Pancreatic Enzymes  Recent Labs   Lab 05/18/20  0857 05/18/20  0010   AMYLASE 56 65     Coagulation Profile  Recent Labs   Lab 05/18/20  1835 05/18/20  1630 05/18/20  1248 05/18/20  0010   INR 1.54* 2.21* 1.42* 2.19*   PTT 31 31 34 38*     Lactate  Invalid input(s): LACTATE    5. RADIOLOGY:   Recent Results (from the past 24 hour(s))   Cardiac Device Check - Inpatient   Result Value    Date Time Interrogation Session [id number]    Implantable Pulse Generator  [employer]    Implantable Pulse Generator Model D151 DYNAGEN    Implantable Pulse Generator Serial Number [id number]    Type Interrogation Session In Clinic    Clinic Name [facility]    Implantable Pulse Generator Type Defibrillator    Implantable Pulse Generator Implant Date 20180703    Implantable Lead  [employer]    Implantable Lead Model 7742 Ingevity MRI    Implantable Lead Serial Number [id number]    Implantable Lead Implant Date 20180703    Implantable Lead Polarity Type Bipolar Lead    Implantable Lead Location Detail 1 UNKNOWN    Implantable Lead Special Function IS-1 Bipolar    Implantable Lead Location Right Ventricle    Implantable Lead  Guidant    Implantable Lead Model 0180 [employer] SG    Implantable Lead Serial Number [id number]    Implantable Lead Implant Date 20180703     Implantable Lead Polarity Type Bipolar Lead    Implantable Lead Location Detail 1 UNKNOWN    Implantable Lead Special Function Endo-Distal    Implantable Lead Location Right Ventricle    James Setting Mode (NBG Code) VVI    James Setting Lower Rate Limit 40    James Setting Hysterisis Rate Off    Lead Channel Setting Sensing Polarity Bipolar    Lead Channel Setting Sensing Sensitivity 0.6    Lead Channel Setting Sensing Adaptation Mode Adaptive    Lead Channel Setting Pacing Polarity Bipolar    Lead Channel Setting Pacing Pulse Width 1.0    Lead Channel Setting Pacing Amplitude 2.2    Lead Channel Setting Pacing Capture Mode Fixed Pacing    Zone Setting Type Category VF    Zone Setting Vendor Type Category VF    Zone Setting Detection Interval 300.0    Zone Setting Type Category VT    Zone Setting Vendor Type Category VT    Zone Setting Detection Interval 375.0    Zone Setting Type Category VT    Zone Setting Vendor Type Category VT-1    Lead Channel Impedance Value 969.0    Lead Channel Sensing Intrinsic Amplitude 19.8    Lead Channel Pacing Threshold Amplitude 0.6000    Lead Channel Pacing Threshold Pulse Width 1.0    Battery Date Time of Measurements 05893442747443    Battery Status Beginning of Service    Battery Remaining Longevity 144    Capacitor Charge Type Reformation    Capacitor Last Charge Date Time 38938439442637    Capacitor Charge Time 9.563    Capacitor Charge Type Shock    Capacitor Charge Time 8.395    Capacitor Charge Energy 41.0    James Statistic Date Time Start 74049881561202    James Statistic Date Time End 30511817050606    James Statistic RV Percent Paced 1    Therapy Statistic Total Shocks Delivered 5    Therapy Statistic Total Shocks Aborted 0    Therapy Statistic Total ATP Delivered 9    Therapy Statistic Total  Date Time End 16302844055145    Therapy Statistic Recent Shocks Delivered 0    Therapy Statistic Recent Shocks Aborted 0    Therapy Statistic Recent ATP Delivered 0    Therapy  Statistic Recent Date Time Start 20191204090301    Therapy Statistic Recent Date Time End 20200518000000    Episode Statistic Total Count 49    Episode Statistic Type Category VT    Episode Statistic Vendor Type Category NSVT    Episode Statistic Total Count 6    Episode Statistic Type Category VF    Episode Statistic Vendor Type Category VF    Episode Statistic Total Count 0    Episode Statistic Type Category VF_VT_MONITOR    Episode Statistic Total Date Time End 20200518000000    Episode Statistic Total Date Time End 20200518000000    Episode Statistic Total Date Time End 20200518000000    Episode Statistic Recent Count 0    Episode Statistic Type Category VT    Episode Statistic Vendor Type Category NSVT    Episode Statistic Recent Count 0    Episode Statistic Type Category VF    Episode Statistic Vendor Type Category VF    Episode Statistic Recent Count 0    Episode Statistic Type Category VF_VT_MONITOR    Episode Statistic Recent Date Time Start 20191204090301    Episode Statistic Recent Date Time End 20200518000000    Episode Statistic Recent Date Time Start 20191204090301    Episode Statistic Recent Date Time End 20200518000000    Episode Statistic Recent Date Time Start 20191204090301    Episode Statistic Recent Date Time End 20200518000000    Narrative    Patient seen on 3 C  for evaluation and iterative programming of a [place] Single lead ICD per MD orders. Patient is scheduled for a heart transplant today. Normal ICD function. No episodes/arrhythmias recorded. Intrinsic rhythm = Regular VS @ 64 bpm.  = <1%. Estimated battery longevity to QUIQUE = 12 years. Lead trends appear stable. Patient reports that he is feeling well and denies complaints. Per Anesthesia orders Tachy therapies programmed OFF. LESTER Bryant RN, BSN.     Single lead ICD    I have reviewed and interpreted the device interrogation, settings, programming and nurse's summary. The device is functioning within normal device  parameters. I agree with the current findings, assessment and plan.   XR Chest Port 1 View    Narrative    EXAMINATION:  XR CHEST PORT 1 VW 5/18/2020 6:39 PM.    COMPARISON: 5/18/2020 at 0008 hours.    HISTORY:  s/p heart txp    FINDINGS: Portable AP view of the chest. Postsurgical changes of heart  transplant. Endotracheal tube tip projects over the mid thoracic  trachea. Right IJ [place]aran[place]-Calvin catheter tip projects over the pulmonary  trunk. Bibasilar chest tubes and 2 mediastinal drains are present.  Epicardial pacer wires are noted. Intact median sternotomy wires.  Retained catheter in the left upper chest at the site of prior  pacemaker.    Midline trachea. Cardiomediastinal silhouette is within normal limits.  Pulmonary vasculature is distinct. Interstitial prominence suggestive  of pulmonary vascular congestion. Trace bilateral pleural effusions.  No appreciable pneumothorax. Upper abdomen is unremarkable.      Impression    IMPRESSION:   1. Postsurgical changes of cardiac transplant with support devices in  appropriate position.  2. Mild pulmonary vascular congestion.  3. Retained catheter in the left upper chest at the site of prior  pacemaker.    I have personally reviewed the examination and initial interpretation  and I agree with the findings.    HIPOLITO DEL ANGEL MD   XR Abdomen Port 1 View    Narrative    XR ABDOMEN PORT 1 VW  5/18/2020 10:17 PM    History:  OG placement.     Comparison: CT abdominal/pelvis dated 5/12/2020    Findings:   50 degrees AP abdominal radiograph. Gastric tube with the tip and  side-port projects over stomach. Nonobstructive bowel gas pattern.  Gallstones projects over right upper quadrant.    Partially visualized Kanaranzi-Calvin catheter, bilateral chest tubes and  mediastinal drainage tube. Mild degenerative changes of thoracolumbar  spine.      Impression    IMPRESSION:  1. Gastric tube with the tip and side-port projects over stomach.  Nonobstructive bowel gas pattern.  2.  Cholelithiasis.    I have personally reviewed the examination and initial interpretation  and I agree with the findings.    JHOANA KHAN MD   XR Chest Port 1 View   Result Value    Radiologist flags Low Endotracheal tube tip (Urgent)    Narrative    EXAM: XR CHEST PORT 1 VW  5/19/2020 8:08 AM      HISTORY: sg catheter monitoring    COMPARISON: Chest x-ray from 5/18/2020 and 7/7/2019    FINDINGS: Semiupright portable AP radiograph of the chest.  Postsurgical changes of cardiac transplantation. The enteric tube  courses inferior to the diaphragm and out of the field of view. The  tip of the endotracheal tube not well visualized, but is likely low,  approximately 1 cm above the kris. Right IJ [place]-Calvin catheter with  tip projecting over the pulmonary trunk. Bilateral chest tubes and 3  mediastinal drains with stable positioning. Catheter again seen  projecting over the left axillary region.     Trachea is midline. The cardiac silhouette is unchanged. Mild  bilateral perihilar and basilar interstitial opacities. Small  bilateral pleural effusions. No pneumothorax. No acute osseous or  abdominal abnormality.      Impression    IMPRESSION:    1. The tip of the right IJ [place]-Calvin catheter projects over the  pulmonary trunk.  2. The tip of the endotracheal tube is not well visualized, but is  likely low, approximately 1 cm above the kris.  3. The remaining support devices are stable in positioning.  4. Mild bilateral perihilar and basilar interstitial opacities, likely  pulmonary edema versus atelectasis.  5. Postsurgical changes of cardiac transplantation.    Findings discussed with the senior radiology resident, Dr. Sahil Mirza.    [Urgent Result: Low Endotracheal tube tip]    Finding was identified on 5/19/2020 8:20 AM.     The ordering provider, Dr. Katerine Harper, was contacted by Dr. Tayler Oseguera at 5/19/2020 9:52 AM and verbalized understanding of the  urgent finding.     I have personally reviewed the  examination and initial interpretation  and I agree with the findings.    MAGGIE FONTENOT MD       =========================================

## 2020-05-21 NOTE — CONSULTS
Endoscopy H&P    Procedure : Colonoscopy      asymptomatic screening exam and most recent endoscopic exam 10 years ago      Past Medical History:   Diagnosis Date    PONV (postoperative nausea and vomiting) 1990    after knee arthroscopy    Vitamin D deficiency 12/18/2019       Past Surgical History:   Procedure Laterality Date    COLONOSCOPY      KNEE ARTHROSCOPY Left     VASECTOMY  1995     No current facility-administered medications on file prior to encounter.      Current Outpatient Medications on File Prior to Encounter   Medication Sig Dispense Refill    atorvastatin (LIPITOR) 20 MG tablet Take 1 tablet (20 mg total) by mouth once daily. 90 tablet 3    ergocalciferol (VITAMIN D2) 50,000 unit Cap Take 50,000 Units by mouth every 7 days.       Review of patient's allergies indicates:  No Known Allergies        Review of Systems -ROS:  GENERAL: No fever, chills, fatigability or weight loss.  CHEST: Denies AVALOS, cyanosis, wheezing, cough and sputum production.  CARDIOVASCULAR: Denies chest pain, PND, orthopnea or reduced exercise tolerance.   Musculoskeletal ROS: negative for - gait disturbance or joint pain  Neurological ROS: negative for - confusion or memory loss        Physical Exam:  General: well developed, well nourished, no distress  Head: normocephalic  Neck: supple, symmetrical, trachea midline  Lungs:  clear to auscultation bilaterally and normal respiratory effort  Heart: regular rate and rhythm, S1, S2 normal, no murmur, rub or gallop and regular rate and rhythm  Abdomen: soft, non-tender non-distented; bowel sounds normal; no masses,  no organomegaly  Extremities: no cyanosis or edema, or clubbing       Moderate Sedation (choice): Mallampati Score 1    ASA : II    IMP: asymptomatic screening exam and most recent endoscopic exam 10 years ago    Plan: Colonoscopy with Moderate sedation.  I have explained the procedure including indications, alternatives, expected outcomes and potential  NEW INPATIENT DIABETES MANAGEMENT CONSULT  Mariusz Sharp  Age: 59 year old  MRN # [id number]   YOB: 1960    Chief Complaint: end stage heart failure, transplant candidate  Reason for Consult: hyperglycemia   Consulting Provider: Rebecca Simms MD    History of Present Illness:   Red is a 59 year old male with PMHx TIIDM, HTN, HLP, ischemic heart disease s/p STEMI with ALYSSA 2019, LVAD placement, CKD III and obesity who was admitted 5/17/20 for heart transplant. He underwent heart transplant on 5/18/20 with Dr. Willis. Donor blood cuultures and respiratory cultures returned positive for H flu, staph and strep.    Of note, he also experienced a recent admission 5/12-5/15 for ANDREW 2/2 nephrolithiasis complicated by hydronephrosis/ANDREW.     He is known to the inpatient diabetes service from prior hospitalization for LVAD placement and for optimization of glucose control prior to heart transplant. Test claim for Victoza required prior auth, does not appear at though he followed up on this. He has continued on basal/bolus insulin regimen at home, with most recent A1c showing sub optimal control of 8.0%.     His most recent insulin dosing is noted below. Control at home reported as good, 100-130's.     This admission, experiencing hyperglycemia following heart transplant, exacerbated by high dose steroids. He was briefly on IV insulin without a steady state basal rate. He was transitioned off IV insulin and onto lower than PTA basal insulin dose, now on PO Prednisone 40mg BID with planned taper. He is also receiving basilixumab, tacrolimus, and mycophenolate for immunosuppression.     Renal function remains lower than baseline: Cr this AM was 2.95, with GFR 22.  Later today, primary team ordered IV insulin for persistent hyperglycemia.     Other Active Medical Problems: heart failure s/p transplant, ANDREW.  Diabetes Mellitus Type: II  Duration:  3 years  Diabetic Complications: coronary artery disease CKD  "III  Prior to Admission Diabetes Regimen:      BG monitoring four times daily  Lantus 35 units at bedtime  Novolog 1 unt per 7g CHO with meals/snacks  Novolog 1/25>140AC, >200 HS  Usual BG control PTA:  suboptimal, with A1c 8.0% on 5/12/20, though this is improved compared to previous.  History of DKA: no  Able to Detect Hypoglycemia: yes, but this is rare.  Usual Diabetes Care Provider: previously Dr. Irving  Primary Care Provider: Like He  Factors Impacting IP Glucose Control: impaired activity and PO intake, steroids/immunosuppresion., impaired renal function    Current Diet:   Orders Placed This Encounter      Regular Diet Adult     10 point ROS completed with pertinent positives and negatives noted in the HPI  Past medical, family and social histories are reviewed and updated.    Social History    Tobacco: never    Alcohol:no    Marital Status: single    Place of Residence: [place], [place]    Family History   mother and father with hx TIIDM    Physical Exam   /84   Temp 97.9  F (36.6  C) (Oral)   Resp 14   Ht 1.626 m (5' 4\")   Wt 90.8 kg (200 lb 2.8 oz)   SpO2 94%   BMI 34.36 kg/m    General: pleasant, in no distress. Resting comfortably sitting in chair.    HEENT: normocephalic, atraumatic. Oral mucous membranes moist.   Lungs: unlabored respiration, no cough  ABD: rounded, soft, no lipodystrophy noted  Skin: warm and dry, no obvious lesions  MSK:  moves all extremities  Lymp:  no LE edema   Mental status:  alert, oriented to self, place, time  Psych:  affect, calm and appropriate interaction     Most Recent Laboratory Tests:  Recent Labs   Lab 05/21/20  0444   HGB 7.6*     No results for input(s): A1C in the last 168 hours.  Recent Labs   Lab 05/21/20  0444   CR 2.10*     Recent Labs   Lab 05/21/20  1911 05/21/20  1818 05/21/20  1727 05/21/20  1658 05/21/20  1548 05/21/20  1150 05/21/20  1028  05/21/20  0444  05/20/20  2137 05/20/20  1606  05/20/20  0918  05/20/20  0338   GLC  --   --   --   --   --   --  " complications. The patient appears to understand and gives informed consent. The patient is medically ready for surgery.       344*  --  235*  --  266* 244*  --  318*  --  336*   * 289* 318* 315* 342* 366*  --    < >  --    < >  --   --    < >  --    < >  --     < > = values in this interval not displayed.       Assessment:   1) TIIDM with steroid hyperglycemia    Primary team placed patient on IV insulin this evening, we will continue to follow and transition when new steady state is known on titrated Prednisone dose (possibly in afternoon 5/22)    Plan:    -NPH insulin 12 units given STAT this AM, discontinue further doses for now.    -discontinued HS Lantus   -continue IV insulin    -initiated aspart 1:12g CHO with meals, snacks, beginning with lunch today.   -BG monitoring q1-2 hours on IV insulin.    -hypoglycemia protocol   -mod CHO diet with carb counting protocol   -on discharge, will recommend outpatient follow up with endocrinology service in [place].    Discussed plan of care with patient  Thank you for this consult; Inpatient Diabetes will continue to follow.     Diabetes Management Team job code: [id number]    I spent a total of 80 minutes bedside and on the inpatient unit managing glycemic care. Over 50% of my time on the unit was spent counseling the patient and/or coordinating care regarding acute hyperglycemia management.  See note for details.    Juana Ly PA-C  Inpatient Diabetes Management Service  Pager [phone]

## 2020-05-22 ENCOUNTER — APPOINTMENT (OUTPATIENT)
Dept: GENERAL RADIOLOGY | Facility: CLINIC | Age: 60
DRG: 001 | End: 2020-05-22
Attending: PHYSICIAN ASSISTANT
Payer: COMMERCIAL

## 2020-05-22 ENCOUNTER — DOCUMENTATION ONLY (OUTPATIENT)
Dept: CARDIOLOGY | Facility: CLINIC | Age: 60
End: 2020-05-22

## 2020-05-22 LAB
ALBUMIN SERPL-MCNC: 2.9 G/DL (ref 3.4–5)
ALP SERPL-CCNC: 31 U/L (ref 40–150)
ALT SERPL W P-5'-P-CCNC: 19 U/L (ref 0–70)
ANION GAP SERPL CALCULATED.3IONS-SCNC: 8 MMOL/L (ref 3–14)
AST SERPL W P-5'-P-CCNC: 16 U/L (ref 0–45)
BILIRUB DIRECT SERPL-MCNC: 0.1 MG/DL (ref 0–0.2)
BILIRUB SERPL-MCNC: 0.5 MG/DL (ref 0.2–1.3)
BUN SERPL-MCNC: 41 MG/DL (ref 7–30)
CALCIUM SERPL-MCNC: 8.1 MG/DL (ref 8.5–10.1)
CELL TYPE ALLO: NORMAL
CELL TYPE AUTO: NORMAL
CHANNELSHIFTALLOB1: -33
CHANNELSHIFTALLOB2: -33
CHANNELSHIFTALLOT1: -10
CHANNELSHIFTALLOT2: -11
CHANNELSHIFTAUTOB1: -33
CHANNELSHIFTAUTOB2: -14
CHANNELSHIFTAUTOT1: -18
CHANNELSHIFTAUTOT2: -13
CHLORIDE SERPL-SCNC: 107 MMOL/L (ref 94–109)
CO2 SERPL-SCNC: 24 MMOL/L (ref 20–32)
COMMENT ALLOB2: NORMAL
CREAT SERPL-MCNC: 1.64 MG/DL (ref 0.66–1.25)
CROSSMATCHDATEALLO: NORMAL
CROSSMATCHDATEAUTO: NORMAL
DONOR ALLO: NORMAL
DONOR AUTO: NORMAL
DONOR IDENTIFICATION: NORMAL
DONORCELLDATE ALLO: NORMAL
DONORCELLDATE AUTO: NORMAL
DSA COMMENTS: NORMAL
DSA PRESENT: NO
DSA TEST METHOD: NORMAL
GFR SERPL CREATININE-BSD FRML MDRD: 46 ML/MIN/{1.73_M2}
GLUCOSE BLDC GLUCOMTR-MCNC: 161 MG/DL (ref 70–99)
GLUCOSE BLDC GLUCOMTR-MCNC: 164 MG/DL (ref 70–99)
GLUCOSE BLDC GLUCOMTR-MCNC: 167 MG/DL (ref 70–99)
GLUCOSE BLDC GLUCOMTR-MCNC: 169 MG/DL (ref 70–99)
GLUCOSE BLDC GLUCOMTR-MCNC: 175 MG/DL (ref 70–99)
GLUCOSE BLDC GLUCOMTR-MCNC: 177 MG/DL (ref 70–99)
GLUCOSE BLDC GLUCOMTR-MCNC: 183 MG/DL (ref 70–99)
GLUCOSE BLDC GLUCOMTR-MCNC: 188 MG/DL (ref 70–99)
GLUCOSE BLDC GLUCOMTR-MCNC: 190 MG/DL (ref 70–99)
GLUCOSE BLDC GLUCOMTR-MCNC: 190 MG/DL (ref 70–99)
GLUCOSE BLDC GLUCOMTR-MCNC: 192 MG/DL (ref 70–99)
GLUCOSE BLDC GLUCOMTR-MCNC: 199 MG/DL (ref 70–99)
GLUCOSE BLDC GLUCOMTR-MCNC: 205 MG/DL (ref 70–99)
GLUCOSE BLDC GLUCOMTR-MCNC: 211 MG/DL (ref 70–99)
GLUCOSE BLDC GLUCOMTR-MCNC: 221 MG/DL (ref 70–99)
GLUCOSE BLDC GLUCOMTR-MCNC: 224 MG/DL (ref 70–99)
GLUCOSE BLDC GLUCOMTR-MCNC: 228 MG/DL (ref 70–99)
GLUCOSE BLDC GLUCOMTR-MCNC: 231 MG/DL (ref 70–99)
GLUCOSE BLDC GLUCOMTR-MCNC: 233 MG/DL (ref 70–99)
GLUCOSE BLDC GLUCOMTR-MCNC: 241 MG/DL (ref 70–99)
GLUCOSE BLDC GLUCOMTR-MCNC: 242 MG/DL (ref 70–99)
GLUCOSE BLDC GLUCOMTR-MCNC: 257 MG/DL (ref 70–99)
GLUCOSE BLDC GLUCOMTR-MCNC: 259 MG/DL (ref 70–99)
GLUCOSE SERPL-MCNC: 191 MG/DL (ref 70–99)
HGB BLD-MCNC: 8 G/DL (ref 13.3–17.7)
MAGNESIUM SERPL-MCNC: 2.2 MG/DL (ref 1.6–2.3)
ORGAN: NORMAL
PHOSPHATE SERPL-MCNC: 1.8 MG/DL (ref 2.5–4.5)
PHOSPHATE SERPL-MCNC: 1.9 MG/DL (ref 2.5–4.5)
POS CUT OFF ALLO B: >93
POS CUT OFF ALLO T: >79
POS CUT OFF AUTO B: >93
POS CUT OFF AUTO T: >79
POTASSIUM SERPL-SCNC: 3.2 MMOL/L (ref 3.4–5.3)
POTASSIUM SERPL-SCNC: 4.4 MMOL/L (ref 3.4–5.3)
PROT SERPL-MCNC: 5.7 G/DL (ref 6.8–8.8)
RESULT ALLO B1: NORMAL
RESULT ALLO B2: NORMAL
RESULT ALLO T1: NORMAL
RESULT ALLO T2: NORMAL
RESULT AUTO B1: NORMAL
RESULT AUTO B2: NORMAL
RESULT AUTO T1: NORMAL
RESULT AUTO T2: NORMAL
SA1 CELL: NORMAL
SA1 COMMENTS: NORMAL
SA1 HI RISK ABY: NORMAL
SA1 MOD RISK ABY: NORMAL
SA1 TEST METHOD: NORMAL
SA2 CELL: NORMAL
SA2 COMMENTS: NORMAL
SA2 HI RISK ABY UA: NORMAL
SA2 MOD RISK ABY: NORMAL
SA2 TEST METHOD: NORMAL
SERUM DATE ALLO B1: NORMAL
SERUM DATE ALLO B2: NORMAL
SERUM DATE ALLO T1: NORMAL
SERUM DATE ALLO T2: NORMAL
SERUM DATE AUTO B1: NORMAL
SERUM DATE AUTO B2: NORMAL
SERUM DATE AUTO T1: NORMAL
SERUM DATE AUTO T2: NORMAL
SODIUM SERPL-SCNC: 139 MMOL/L (ref 133–144)
TESTMETHODALLO: NORMAL
TESTMETHODAUTO: NORMAL
TREATMENT ALLO B1: NORMAL
TREATMENT ALLO B2: NORMAL
TREATMENT ALLO T1: NORMAL
TREATMENT ALLO T2: NORMAL
TREATMENT AUTO B1: NORMAL
TREATMENT AUTO B2: NORMAL
TREATMENT AUTO T1: NORMAL
TREATMENT AUTO T2: NORMAL
UNACCEPTABLE ANTIGEN: NORMAL
UNOS CPRA: 0
VANCOMYCIN SERPL-MCNC: 12.5 MG/L

## 2020-05-22 PROCEDURE — 36569 INSJ PICC 5 YR+ W/O IMAGING: CPT

## 2020-05-22 PROCEDURE — 25000128 H RX IP 250 OP 636: Performed by: THORACIC SURGERY (CARDIOTHORACIC VASCULAR SURGERY)

## 2020-05-22 PROCEDURE — 25000125 ZZHC RX 250: Performed by: STUDENT IN AN ORGANIZED HEALTH CARE EDUCATION/TRAINING PROGRAM

## 2020-05-22 PROCEDURE — 27211389 ZZ H KIT, 5 FR DL BIOFLO OPEN ENDED PICC

## 2020-05-22 PROCEDURE — 25000128 H RX IP 250 OP 636: Performed by: PHYSICIAN ASSISTANT

## 2020-05-22 PROCEDURE — 25000125 ZZHC RX 250: Performed by: PHYSICIAN ASSISTANT

## 2020-05-22 PROCEDURE — 25800030 ZZH RX IP 258 OP 636: Performed by: STUDENT IN AN ORGANIZED HEALTH CARE EDUCATION/TRAINING PROGRAM

## 2020-05-22 PROCEDURE — 25000131 ZZH RX MED GY IP 250 OP 636 PS 637: Performed by: PHYSICIAN ASSISTANT

## 2020-05-22 PROCEDURE — 25000128 H RX IP 250 OP 636: Performed by: SURGERY

## 2020-05-22 PROCEDURE — 25000132 ZZH RX MED GY IP 250 OP 250 PS 637: Performed by: THORACIC SURGERY (CARDIOTHORACIC VASCULAR SURGERY)

## 2020-05-22 PROCEDURE — 25000132 ZZH RX MED GY IP 250 OP 250 PS 637: Performed by: STUDENT IN AN ORGANIZED HEALTH CARE EDUCATION/TRAINING PROGRAM

## 2020-05-22 PROCEDURE — 84132 ASSAY OF SERUM POTASSIUM: CPT | Performed by: THORACIC SURGERY (CARDIOTHORACIC VASCULAR SURGERY)

## 2020-05-22 PROCEDURE — 36415 COLL VENOUS BLD VENIPUNCTURE: CPT | Performed by: THORACIC SURGERY (CARDIOTHORACIC VASCULAR SURGERY)

## 2020-05-22 PROCEDURE — 25800030 ZZH RX IP 258 OP 636: Performed by: THORACIC SURGERY (CARDIOTHORACIC VASCULAR SURGERY)

## 2020-05-22 PROCEDURE — 99233 SBSQ HOSP IP/OBS HIGH 50: CPT | Mod: GC | Performed by: INTERNAL MEDICINE

## 2020-05-22 PROCEDURE — 25000132 ZZH RX MED GY IP 250 OP 250 PS 637: Performed by: PHYSICIAN ASSISTANT

## 2020-05-22 PROCEDURE — 80076 HEPATIC FUNCTION PANEL: CPT | Performed by: PHYSICIAN ASSISTANT

## 2020-05-22 PROCEDURE — 80076 HEPATIC FUNCTION PANEL: CPT | Performed by: STUDENT IN AN ORGANIZED HEALTH CARE EDUCATION/TRAINING PROGRAM

## 2020-05-22 PROCEDURE — 27211417 ZZ H KIT, VPS RHYTHM STYLET

## 2020-05-22 PROCEDURE — 27210577 ZZ H INTRODUCER MICRO SET

## 2020-05-22 PROCEDURE — 80202 ASSAY OF VANCOMYCIN: CPT | Performed by: THORACIC SURGERY (CARDIOTHORACIC VASCULAR SURGERY)

## 2020-05-22 PROCEDURE — 84100 ASSAY OF PHOSPHORUS: CPT | Performed by: PHYSICIAN ASSISTANT

## 2020-05-22 PROCEDURE — 85018 HEMOGLOBIN: CPT | Performed by: PHYSICIAN ASSISTANT

## 2020-05-22 PROCEDURE — 83735 ASSAY OF MAGNESIUM: CPT | Performed by: PHYSICIAN ASSISTANT

## 2020-05-22 PROCEDURE — 00000146 ZZHCL STATISTIC GLUCOSE BY METER IP

## 2020-05-22 PROCEDURE — 80048 BASIC METABOLIC PNL TOTAL CA: CPT | Performed by: PHYSICIAN ASSISTANT

## 2020-05-22 PROCEDURE — 40000986 XR CHEST PORT 1 VW

## 2020-05-22 PROCEDURE — 25800030 ZZH RX IP 258 OP 636: Performed by: PHYSICIAN ASSISTANT

## 2020-05-22 PROCEDURE — 84100 ASSAY OF PHOSPHORUS: CPT | Performed by: THORACIC SURGERY (CARDIOTHORACIC VASCULAR SURGERY)

## 2020-05-22 PROCEDURE — 71046 X-RAY EXAM CHEST 2 VIEWS: CPT

## 2020-05-22 PROCEDURE — 25000128 H RX IP 250 OP 636: Performed by: STUDENT IN AN ORGANIZED HEALTH CARE EDUCATION/TRAINING PROGRAM

## 2020-05-22 PROCEDURE — 02HV33Z INSERTION OF INFUSION DEVICE INTO SUPERIOR VENA CAVA, PERCUTANEOUS APPROACH: ICD-10-PCS | Performed by: THORACIC SURGERY (CARDIOTHORACIC VASCULAR SURGERY)

## 2020-05-22 PROCEDURE — 25000125 ZZHC RX 250: Performed by: SURGERY

## 2020-05-22 PROCEDURE — 36415 COLL VENOUS BLD VENIPUNCTURE: CPT | Performed by: PHYSICIAN ASSISTANT

## 2020-05-22 PROCEDURE — 25000132 ZZH RX MED GY IP 250 OP 250 PS 637: Performed by: SURGERY

## 2020-05-22 PROCEDURE — 21400000 ZZH R&B CCU UMMC

## 2020-05-22 RX ORDER — MYCOPHENOLATE MOFETIL 500 MG/1
1500 TABLET ORAL 2 TIMES DAILY
Status: DISCONTINUED | OUTPATIENT
Start: 2020-05-22 | End: 2020-05-29 | Stop reason: HOSPADM

## 2020-05-22 RX ORDER — METRONIDAZOLE 500 MG/1
500 TABLET ORAL 3 TIMES DAILY
Status: DISCONTINUED | OUTPATIENT
Start: 2020-05-23 | End: 2020-05-22

## 2020-05-22 RX ORDER — ASPIRIN 81 MG/1
81 TABLET ORAL DAILY
Status: DISCONTINUED | OUTPATIENT
Start: 2020-05-22 | End: 2020-05-29 | Stop reason: HOSPADM

## 2020-05-22 RX ORDER — CEFTRIAXONE 2 G/1
2 INJECTION, POWDER, FOR SOLUTION INTRAMUSCULAR; INTRAVENOUS EVERY 24 HOURS
Status: DISCONTINUED | OUTPATIENT
Start: 2020-05-23 | End: 2020-05-22

## 2020-05-22 RX ORDER — FUROSEMIDE 20 MG
20 TABLET ORAL ONCE
Status: COMPLETED | OUTPATIENT
Start: 2020-05-22 | End: 2020-05-22

## 2020-05-22 RX ORDER — LIDOCAINE 40 MG/G
CREAM TOPICAL
Status: DISCONTINUED | OUTPATIENT
Start: 2020-05-22 | End: 2020-05-27

## 2020-05-22 RX ORDER — ROSUVASTATIN CALCIUM 10 MG/1
10 TABLET, COATED ORAL DAILY
Status: DISCONTINUED | OUTPATIENT
Start: 2020-05-22 | End: 2020-05-29 | Stop reason: HOSPADM

## 2020-05-22 RX ORDER — VALGANCICLOVIR 450 MG/1
450 TABLET, FILM COATED ORAL DAILY
Status: DISCONTINUED | OUTPATIENT
Start: 2020-05-22 | End: 2020-05-23

## 2020-05-22 RX ORDER — SULFAMETHOXAZOLE AND TRIMETHOPRIM 400; 80 MG/1; MG/1
1 TABLET ORAL DAILY
Status: DISCONTINUED | OUTPATIENT
Start: 2020-05-22 | End: 2020-05-28

## 2020-05-22 RX ORDER — HEPARIN SODIUM,PORCINE 10 UNIT/ML
2-5 VIAL (ML) INTRAVENOUS
Status: COMPLETED | OUTPATIENT
Start: 2020-05-22 | End: 2020-05-26

## 2020-05-22 RX ORDER — SULFAMETHOXAZOLE AND TRIMETHOPRIM 400; 80 MG/1; MG/1
1 TABLET ORAL ONCE
Status: DISCONTINUED | OUTPATIENT
Start: 2020-05-22 | End: 2020-05-22 | Stop reason: CLARIF

## 2020-05-22 RX ORDER — METRONIDAZOLE 500 MG/1
500 TABLET ORAL 3 TIMES DAILY
Status: COMPLETED | OUTPATIENT
Start: 2020-05-23 | End: 2020-05-25

## 2020-05-22 RX ORDER — POTASSIUM CHLORIDE 750 MG/1
20 TABLET, EXTENDED RELEASE ORAL ONCE
Status: COMPLETED | OUTPATIENT
Start: 2020-05-22 | End: 2020-05-22

## 2020-05-22 RX ADMIN — TERBUTALINE SULFATE 10 MG: 5 TABLET ORAL at 19:54

## 2020-05-22 RX ADMIN — HEPARIN SODIUM 5000 UNITS: 5000 INJECTION, SOLUTION INTRAVENOUS; SUBCUTANEOUS at 07:07

## 2020-05-22 RX ADMIN — PIPERACILLIN AND TAZOBACTAM 3.38 G: 3; .375 INJECTION, POWDER, FOR SOLUTION INTRAVENOUS at 14:18

## 2020-05-22 RX ADMIN — HEPARIN SODIUM 5000 UNITS: 5000 INJECTION, SOLUTION INTRAVENOUS; SUBCUTANEOUS at 14:19

## 2020-05-22 RX ADMIN — MYCOPHENOLATE MOFETIL 1500 MG: 500 TABLET ORAL at 09:57

## 2020-05-22 RX ADMIN — VALGANCICLOVIR 450 MG: 450 TABLET, FILM COATED ORAL at 10:31

## 2020-05-22 RX ADMIN — HUMAN INSULIN 6 UNITS/HR: 100 INJECTION, SOLUTION SUBCUTANEOUS at 05:10

## 2020-05-22 RX ADMIN — PIPERACILLIN AND TAZOBACTAM 3.38 G: 3; .375 INJECTION, POWDER, FOR SOLUTION INTRAVENOUS at 02:14

## 2020-05-22 RX ADMIN — HUMAN INSULIN 13 UNITS/HR: 100 INJECTION, SOLUTION SUBCUTANEOUS at 20:20

## 2020-05-22 RX ADMIN — HEPARIN SODIUM 5000 UNITS: 5000 INJECTION, SOLUTION INTRAVENOUS; SUBCUTANEOUS at 22:03

## 2020-05-22 RX ADMIN — NYSTATIN 1000000 UNITS: 100000 SUSPENSION ORAL at 12:14

## 2020-05-22 RX ADMIN — ASPIRIN 81 MG: 81 TABLET, COATED ORAL at 09:58

## 2020-05-22 RX ADMIN — POTASSIUM CHLORIDE 20 MEQ: 750 TABLET, EXTENDED RELEASE ORAL at 09:57

## 2020-05-22 RX ADMIN — HYDROMORPHONE HYDROCHLORIDE 0.5 MG: 1 INJECTION, SOLUTION INTRAMUSCULAR; INTRAVENOUS; SUBCUTANEOUS at 08:09

## 2020-05-22 RX ADMIN — TERBUTALINE SULFATE 10 MG: 5 TABLET ORAL at 03:52

## 2020-05-22 RX ADMIN — FUROSEMIDE 20 MG: 20 TABLET ORAL at 09:58

## 2020-05-22 RX ADMIN — PREDNISONE 35 MG: 5 TABLET ORAL at 18:13

## 2020-05-22 RX ADMIN — PREDNISONE 35 MG: 5 TABLET ORAL at 08:10

## 2020-05-22 RX ADMIN — POTASSIUM CHLORIDE 20 MEQ: 750 TABLET, EXTENDED RELEASE ORAL at 09:58

## 2020-05-22 RX ADMIN — NYSTATIN 1000000 UNITS: 100000 SUSPENSION ORAL at 08:09

## 2020-05-22 RX ADMIN — SULFAMETHOXAZOLE AND TRIMETHOPRIM 1 TABLET: 400; 80 TABLET ORAL at 10:31

## 2020-05-22 RX ADMIN — PIPERACILLIN AND TAZOBACTAM 3.38 G: 3; .375 INJECTION, POWDER, FOR SOLUTION INTRAVENOUS at 08:09

## 2020-05-22 RX ADMIN — HUMAN INSULIN 9 UNITS/HR: 100 INJECTION, SOLUTION SUBCUTANEOUS at 22:23

## 2020-05-22 RX ADMIN — HUMAN INSULIN 14 UNITS/HR: 100 INJECTION, SOLUTION SUBCUTANEOUS at 01:18

## 2020-05-22 RX ADMIN — NYSTATIN 1000000 UNITS: 100000 SUSPENSION ORAL at 17:03

## 2020-05-22 RX ADMIN — MYCOPHENOLATE MOFETIL 1500 MG: 500 TABLET ORAL at 19:54

## 2020-05-22 RX ADMIN — ROSUVASTATIN CALCIUM 10 MG: 10 TABLET, FILM COATED ORAL at 09:58

## 2020-05-22 RX ADMIN — POTASSIUM PHOSPHATE, MONOBASIC AND POTASSIUM PHOSPHATE, DIBASIC 20 MMOL: 224; 236 INJECTION, SOLUTION INTRAVENOUS at 11:50

## 2020-05-22 RX ADMIN — INSULIN ASPART 4 UNITS: 100 INJECTION, SOLUTION INTRAVENOUS; SUBCUTANEOUS at 08:11

## 2020-05-22 RX ADMIN — INSULIN HUMAN 50 UNITS: 100 INJECTION, SUSPENSION SUBCUTANEOUS at 19:56

## 2020-05-22 RX ADMIN — VANCOMYCIN HYDROCHLORIDE 1750 MG: 10 INJECTION, POWDER, LYOPHILIZED, FOR SOLUTION INTRAVENOUS at 19:54

## 2020-05-22 RX ADMIN — POTASSIUM CHLORIDE 40 MEQ: 750 TABLET, EXTENDED RELEASE ORAL at 08:33

## 2020-05-22 RX ADMIN — HUMAN INSULIN 9 UNITS/HR: 100 INJECTION, SOLUTION SUBCUTANEOUS at 11:51

## 2020-05-22 RX ADMIN — HUMAN INSULIN 8 UNITS/HR: 100 INJECTION, SOLUTION SUBCUTANEOUS at 09:18

## 2020-05-22 RX ADMIN — INSULIN ASPART 7 UNITS: 100 INJECTION, SOLUTION INTRAVENOUS; SUBCUTANEOUS at 13:38

## 2020-05-22 RX ADMIN — TERBUTALINE SULFATE 10 MG: 5 TABLET ORAL at 12:14

## 2020-05-22 RX ADMIN — HUMAN INSULIN 9 UNITS/HR: 100 INJECTION, SOLUTION SUBCUTANEOUS at 17:34

## 2020-05-22 RX ADMIN — LIDOCAINE HYDROCHLORIDE 4 ML: 10 INJECTION, SOLUTION EPIDURAL; INFILTRATION; INTRACAUDAL; PERINEURAL at 16:23

## 2020-05-22 RX ADMIN — SODIUM CHLORIDE 20 MG: 9 INJECTION, SOLUTION INTRAVENOUS at 09:58

## 2020-05-22 RX ADMIN — INSULIN ASPART 19 UNITS: 100 INJECTION, SOLUTION INTRAVENOUS; SUBCUTANEOUS at 19:57

## 2020-05-22 RX ADMIN — NYSTATIN 1000000 UNITS: 100000 SUSPENSION ORAL at 19:54

## 2020-05-22 RX ADMIN — HUMAN INSULIN 15 UNITS/HR: 100 INJECTION, SOLUTION SUBCUTANEOUS at 14:56

## 2020-05-22 RX ADMIN — PANTOPRAZOLE SODIUM 40 MG: 40 TABLET, DELAYED RELEASE ORAL at 08:10

## 2020-05-22 ASSESSMENT — ACTIVITIES OF DAILY LIVING (ADL)
ADLS_ACUITY_SCORE: 15
ADLS_ACUITY_SCORE: 16

## 2020-05-22 ASSESSMENT — MIFFLIN-ST. JEOR: SCORE: 1625.05

## 2020-05-22 NOTE — PROGRESS NOTES
Cardiology Consult Progress Note     ASSESSMENT:   Mariusz Sharp is a 59 year old male history of CAD (s/p STEMI with ALYSSA to LAD 6/27/18), LV thrombus, CKD Stage III, PAF, DM Type II, and ICM with EF 20-25% s/p HeartMate 3 LVAD implant 12/31/18 who presented for OHT 5/18/2020    Plan today:   - Basiliximab given today   - Start bactrim single strength daily, crestor 10mg po daily   - RHC/Biopsy on Tuesday 5/26   - Antibiotics per transplant ID   - Resumption of aspirin per CVTS primary team     Pressors: none  Other drips: none    Serostatus: Donor unknown  CMV: D+, R-, EBV: D- R+, Toxo D- R-  Blood type: A     Immunosuppression:  -Calcineurin Inhibitor: due to renal dysfunction, anti-IL-2 given - Basiliximab 20 mg IV, second dose 5/22; tacrolimus to follow pending renal function with goal level 10-15 for first 3 months  -Cell cycle Inhibitor:Continue Cellcept 1500 mg BID (generic 1500 mg given preoperatively)  -Steroid: methylprednisolone 1 gram given preoperatively, followed by 500 mg IV at release of cross clamp; given 125 mg IV q8 hours x 3 doses, followed by prednisone 1 mg/kg daily (in BID dosing, taper 5 mg daily)  -Endomyocardial biopsy: first 5/26; weekly 4 weeks, bi-weekly 2 months, monthly 3-6 months     Infection Prophylaxis:  -PCP/Toxo: Start bactrim single strength daily   -Thrush: Nystatin  -CMV (D+/R-): Valcyte 450 every other day; 6 months total   -Coronary allograft vasculopathy: hold asa, start crestor 10mg po daily      Discussed with Dr.Kamdar Katerine Harper   Cardiology PGY4     REASON FOR CONSULT: heart transplant     Subjective:   Reported feeling fatigued this morning     CURRENT MEDICATIONS:  No current outpatient medications on file.     Physical Exam   Temp: 98.1  F (36.7  C) Temp src: Oral BP: (!) 140/78 Pulse: 107 Heart Rate: 109 Resp: 16 SpO2: 97 % O2 Device: None (Room air)    Vital Signs with Ranges  Temp:  [97.4  F (36.3  C)-98.5  F (36.9  C)] 98.1  F (36.7  C)  Pulse:   [] 107  Heart Rate:  [] 109  Resp:  [11-20] 16  BP: (108-151)/(69-96) 140/78  MAP:  [98 mmHg] 98 mmHg  Arterial Line BP: (137)/(77) 137/77  SpO2:  [91 %-97 %] 97 %  196 lbs 0 oz    GEN: NAD, pleasant  HEENT: no icterus  CV: RRR, normal s1/s2, no murmurs/rubs/s3/s4, no heave.   CHEST: CTAB, midline surgical scar   ABD: soft, NT/ND, NABS  : no flank/suprapubic tenderness  NEURO: AA&Ox3, fluent/appropriate, motor grossly nonfocal  PSYCH: cooperative, affect appropriate       Data   Recent Labs   Lab 05/22/20  0745 05/21/20  1028 05/21/20  0444 05/20/20  2137  05/19/20  0943  05/18/20  1835 05/18/20  1630  05/18/20  1248   WBC  --  10.6 10.0 10.7   < > 16.8*   < > 20.1*  --   --   --    HGB 8.0* 7.7* 7.6* 7.5*   < > 8.0*   < > 9.6* 8.9*   < >  --    MCV  --  83 83 83   < > 84   < > 82  --   --   --    PLT  --  167 166 154   < > 192   < > 192 185  --   --    INR  --   --   --   --   --   --   --  1.54* 2.21*  --  1.42*    138 138 135   < > 141   < > 141 142   < >  --    POTASSIUM 3.2* 4.0 3.9 4.0   < > 4.4   < > 3.7 3.8   < >  --    CHLORIDE 107 107 107 103   < > 112*   < > 109  --   --   --    CO2 24 21 23 25   < > 21   < > 24  --   --   --    BUN 41* 42* 48* 50*   < > 36*   < > 39*  --   --   --    CR 1.64* 1.80* 2.10* 2.42*   < > 2.40*   < > 1.90*  --   --   --    ANIONGAP 8 9 8 7   < > 8   < > 8  --   --   --    ARLENE 8.1* 7.4* 7.6* 8.0*   < > 8.4*   < > 8.1*  --   --   --    * 344* 235* 266*   < > 128*   < > 115* 179*   < >  --    ALBUMIN 2.9*  --   --   --   --  3.0*  --  2.8*  --   --   --    PROTTOTAL 5.7*  --   --   --   --  5.4*  --  5.4*  --   --   --    BILITOTAL 0.5  --   --   --   --  1.0  --  1.5*  --   --   --    ALKPHOS 31*  --   --   --   --  27*  --  34*  --   --   --    ALT 19  --   --   --   --  26  --  30  --   --   --    AST 16  --   --   --   --  98*  --  112*  --   --   --     < > = values in this interval not displayed.       No results found for this or any previous  visit (from the past 24 hour(s)).

## 2020-05-22 NOTE — PROGRESS NOTES
Cardiovascular Surgery Progress Note  5/22/2020         Assessment and Plan:     Mariusz Sharp is a 59 year old male history of CAD (s/p STEMI with ALYSSA to LAD 6/27/18), LV thrombus, CKD Stage III, PAF, DM Type II (on insulin), and ICM with EF 20-25% s/p HeartMate 3 LVAD implant 12/31/18, he presented from home (COVID negative) on 5/17/20 with an offer for a donor organ and had an orthotopic heart transplant with removal of ICD with Dr Willis and Dr Baker on 5/18.  He did well in the ICU and was quickly weaned off pressors and extubated POD #1 to 2 lpm via NC with neuro status intact. Endocrine was consulted for transition off of the insulin gtt. ID was consulted after the donor's sputum and blood had growth. He responded well to gentle diuresis before transferring out of the ICU.     Cardiovascular:   Hx VT, systolic HF, AF, and ICM. S/P LVAD Dec, 2018.     S/p orthotopic heart transplant   - No arrhythmia, HD stable.  - Terbutaline 10 mg TID. 's.    - Okay to start ASA 81 mg and rosuvastatin   - Cards dosing immunosuppression, Simulect given 5/22.  Chest tubes: R and L pleural tubes with pericardial drain  TPW: capped 5/22     Pulmonary:  - Extubated POD 1 to 2 lpm via NC; Saturating well on RA.   - Supplemental O2 PRN to keep sats > 92%. Wean off as tolerated.  - Pulm toilet, IS, activity and deep breathing    Neurology / MSK:  - Neuro intact  - Acute post-operative pain; pain well controlled with acetaminophen, PO oxycodone PRN, IV dilaudid PRN     / Renal:  - Hx stage III renal disease. Most recent creatinine 1.64 and improving, adequate UOP.  - Diuresis per Cardiology.     GI / FEN:   - Regular diet, continue bowel regimen  - Replace electrolytes as needed, hepatic enzymes WNL.    Endocrine:  T2 DM on insulin   - Appreciate Endocrine following, currently on insulin gtt and prandial dosing    Infectious Disease:  At risk for donor-derived infection  - ID was consulted 5/21 when the donor blood grew S  "anginosus and Veillonella sp.  The S anginosus is usually susceptible to cephalosporins and Veillonella to flagyl. After the routine zosyn (ends 5/22), recommended continuing Vancomycin until 6/1 and start flagyl 500 mg tid PO until 5/25.   - The S aureus and the H influenza in the sputum of the donor are of no clinical value in this recipient of the donor's heart.    - WBC WNL, remains afebrile, no signs or symptoms of infection.   - Packing Driveline site BID with moistened Nugauze strips     Hematology:   - Acute blood loss anemia and thrombocytopenia  - Hgb 8.0; Plt WNL, no signs or symptoms of active bleeding    Anticoagulation:   - ASA only     Prophylaxis:   - Stress ulcer prophylaxis: Pantoprazole 40 mg daily for 30 days  - DVT prophylaxis: Subcutaneous heparin, SCD    Disposition:   - Transferred to  on 5/21  - Rehab recommending discharge to Home with outpt rehab      Discussed with CVTS Fellow as needed.  Staff surgeons have been informed of changes through both verbal and written communication.      Guillermo Ortiz PA-C  Cardiothoracic Surgery  Pager [phone]    7:33 AM   May 22, 2020          Interval History:     No overnight events.  Up from ICU last night.   States pain is well managed on current regimen. Slept poorly overnight due to insulin checks q 1 hr.   Tolerating diet, is passing flatus, + BM. No nausea or vomiting.  Breathing well without complaints.   Working with therapies and ambulating in halls without assistance.   Denies chest pain, palpitations, dizziness, syncopal symptoms, fevers, chills, myalgias, or sternal popping/clicking.         Physical Exam:   Blood pressure 125/79, pulse 107, temperature 98.5  F (36.9  C), temperature source Oral, resp. rate 16, height 1.626 m (5' 4\"), weight 88.9 kg (196 lb), SpO2 97 %.  Vitals:    05/20/20 0600 05/21/20 0400 05/22/20 0655   Weight: 92 kg (202 lb 14.4 oz) 90.8 kg (200 lb 2.8 oz) 88.9 kg (196 lb)      Weight; + 1.5 kg since admit and " trending down.   24 hr Fluid status; net loss 1.7 L.   MAPs: 91 - 115     Gen: A&Ox4, NAD  Neuro: no focal deficits   CV: sinus tachy, normal S1 S2, no murmurs, rubs or gallops.     * capped TPW   Pulm: CTA, no wheezing or rhonchi, normal breathing on RA  Abd: nondistended, normal BS, soft, nontender    * repacked Driveline site, clean and dry   Incision: clean, dry, intact, no erythema, sternum stable  Tubes/drain sites: dressing clean and dry, serosanguinous output, no air leak. 24 hr output 390 mL.     * pulled mediastinal tubes and split pleural tubes          Data:    Imaging:  reviewed recent imaging, no acute concerns    Labs:  [facility]  Recent Labs   Lab 05/21/20  1028 05/21/20  0444 05/20/20 2137 05/20/20  1606    138 135 136   POTASSIUM 4.0 3.9 4.0 3.8   CHLORIDE 107 107 103 104   ARLENE 7.4* 7.6* 8.0* 7.7*   CO2 21 23 25 22   BUN 42* 48* 50* 48*   CR 1.80* 2.10* 2.42* 2.44*   * 235* 266* 244*     CBC  Recent Labs   Lab 05/21/20  1028 05/21/20 0444 05/20/20 2137 05/20/20  1606   WBC 10.6 10.0 10.7 10.6   RBC 2.98* 2.94* 2.95* 2.99*   HGB 7.7* 7.6* 7.5* 7.8*   HCT 24.8* 24.4* 24.5* 24.7*   MCV 83 83 83 83   MCH 25.8* 25.9* 25.4* 26.1*   MCHC 31.0* 31.1* 30.6* 31.6   RDW 16.7* 16.4* 16.4* 16.4*    166 154 151     INR  Recent Labs   Lab 05/18/20  1835 05/18/20  1630 05/18/20  1248 05/18/20  0010   INR 1.54* 2.21* 1.42* 2.19*      Liver Function Studies -   Recent Labs   Lab Test 05/19/20  0943   PROTTOTAL 5.4*   ALBUMIN 3.0*   BILITOTAL 1.0   ALKPHOS 27*   AST 98*   ALT 26     GLUCOSE:   Recent Labs   Lab 05/22/20  0705 05/22/20  0537 05/22/20  0434 05/22/20  0327 05/22/20  0227 05/22/20  0131  05/21/20  1028  05/21/20  0444  05/20/20  2137 05/20/20  1606  05/20/20  0918  05/20/20  0338   GLC  --   --   --   --   --   --   --  344*  --  235*  --  266* 244*  --  318*  --  336*   * 169* 167* 175* 188* 224*   < >  --    < >  --    < >  --   --    < >  --    < >  --     < > = values in this  interval not displayed.

## 2020-05-22 NOTE — PHARMACY-VANCOMYCIN DOSING SERVICE
Pharmacy Vancomycin Note  Date of Service May 22, 2020  Patient's  1960   59 year old, male    Indication: Postoperative Infection and Surgical Prophylaxis  Goal Trough Level: 15-20 mg/L  Day of Therapy: 5  Current Vancomycin regimen:  1500 mg IV q24h    Current estimated CrCl = Estimated Creatinine Clearance: 50 mL/min (A) (based on SCr of 1.64 mg/dL (H)).    Creatinine for last 3 days  2020: 10:30 PM Creatinine 2.72 mg/dL  2020:  3:38 AM Creatinine 2.81 mg/dL;  9:18 AM Creatinine 2.58 mg/dL;  4:06 PM Creatinine 2.44 mg/dL;  9:37 PM Creatinine 2.42 mg/dL  2020:  4:44 AM Creatinine 2.10 mg/dL; 10:28 AM Creatinine 1.80 mg/dL  2020:  7:45 AM Creatinine 1.64 mg/dL    Recent Vancomycin Levels (past 3 days)  2020: 11:45 AM Vancomycin Level 15.3 mg/L  2020:  5:04 PM Vancomycin Level 12.5 mg/L    Vancomycin IV Administrations (past 72 hours)                   vancomycin 1500 mg in 0.9% NaCl 250 ml intermittent infusion 1,500 mg (mg) 1,500 mg Given 20 1659    vancomycin 1500 mg in 0.9% NaCl 250 ml intermittent infusion 1,500 mg (mg) 1,500 mg Given 20 1559                Nephrotoxins and other renal medications (From now, onward)    Start     Dose/Rate Route Frequency Ordered Stop    20 1845  vancomycin (VANCOCIN) 1,750 mg in sodium chloride 0.9 % 250 mL intermittent infusion      1,750 mg (central catheter)  over 60 Minutes Intravenous EVERY 24 HOURS 20 1842               Contrast Orders - past 72 hours (72h ago, onward)    None          Interpretation of levels and current regimen:  Trough level is  Subtherapeutic    Has serum creatinine changed > 50% in last 72 hours: Yes, improved.    Urine output:  good urine output    Renal Function: Improving    Plan:  1. Increase dose to 1750 mg IV q24h.  2.  Pharmacy will check trough levels as appropriate in 1-3 Days.    3.  Per ID, continue vancomycin for a total of 14 days through .    Hannah Kendall, PharmD, pager  [phone]      .

## 2020-05-22 NOTE — PROGRESS NOTES
[facility]    Transplant Infectious Diseases Inpatient Progress Note      Mariusz Shrap MRN# [id number]   YOB: 1960 Age: 59 year old   Date of Admission and time: 5/17/2020 11:24 PM             Recommendations:   1. Given GPB from the explanted ventricular cardiac assistive device, I would continue vancomycin IV till 6/1/2020 (total of 14 days).   2. Ok to discontinue zosyn after last dose today.   3. May start flagyl 500 mg tid PO 5/23/2020 till 5/25/2020 (total of 7 days accounting for zosyn).    4. CMV and PJP ppx per your protocol.     Dr. Hightower is available over the holiday weekend for questions, I will resume the patient's care on Tuesday.         Summary of Presentation:   Transplants:  5/18/2020 (Heart), Postoperative day:  4     This patient is a 59 year old male with ICMP s/p OHT basiliximab for induction, MMF/prednisone as of now for maintenance IS.   Donor with positive blood and respiratory cx.         Active Problems and Infectious Diseases Issues:   1. At risk for donor-derived infection.   The donor blood cx grew S anginosus and Veillonella sp in one set out of 2 sets obtained 5/16/2020, this is likely a contaminant but will treat for total of .   The S anginosus is usually susceptible to cephalosporins and Veillonella to flagyl. However, given that we are going to treat the GPB with vancomycin, there would be no need for ceftriaxone at this point.     The MSSA and the H influenza in the sputum of the donor are of no clinical value in this recipient of the donor's heart.      2. Positive cx from the ventricular assistive device.   The GPB grew 4 days later and only in the broth. This is likely a contaminant but given the OHT will empirically treat for total of 14 days.  This is either Corynebacterium or Propionibacterium (Cutibacterium). Would treat with vancomycin for now.         Old Problems and Infectious Diseases Issues:   None.     Other Infectious  Disease issues include:  - PCP prophylaxis: per your protocol.   - Serostatus: CMV D+/R-, EBV D-/R+, HSV1?/2?, VZV +, Toxo D-/R-  - Immunization status: Too early to be discussed.   - Gamma globulin status: ?      Thank you very much for involving me in the care of Mr. Mariusz Sharp. Please do not hesitate to call me for any question.     Attestation:  I interviewed the patient and obtained history from the patient, and by reviewing the patient's chart including outside records, microbiological data, and radiological data. All data are summarized in this notes.  Tavares Rodriguez MD   Pager: [phone]  05/22/2020           Interim History:   No new complaints.   2 tubes were removed today.          History of Present Illness:   Transplants:  5/18/2020 (Heart), Postoperative day:  4     This patient is a 59 year old male with ICMP admitted to undergo OHT with no complications.   The early post-operative course is not eventful. The patient was extubated within 24 hr.   The patient has no complaints except for diarrhea induced by senna.     The donor blood cx grew S anginosus and Veillonella. And the sputum grew H influenza and S aureus.     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   He lives in [place] in a house with his father who has dementia.   He fishes.   His last travel outside of [place] was to [facility] in 2019.   No institutionalization.                   Allergies:   No Known Allergies          Medications:   Medications that Require Transfusion:     dextrose       insulin (regular) 11 Units/hr (05/22/20 1002)     BETA BLOCKER NOT PRESCRIBED       sodium chloride 10 mL/hr at 05/22/20 0821     Scheduled Medications:     aspirin  81 mg Oral Daily     [START ON 5/23/2020] cefTRIAXone  2 g Intravenous Q24H     heparin ANTICOAGULANT  5,000 Units Subcutaneous Q8H      insulin aspart   Subcutaneous TID AC     [START ON 5/23/2020] metroNIDAZOLE  500 mg Oral TID     mycophenolate  1,500 mg Oral BID     nystatin  1,000,000 Units Oral 4x Daily     pantoprazole  40 mg Oral QAM AC     piperacillin-tazobactam  3.375 g Intravenous Q6H     predniSONE  35 mg Oral BID w/meals    Followed by     [START ON 5/24/2020] predniSONE  30 mg Oral BID w/meals    Followed by     [START ON 5/26/2020] predniSONE  25 mg Oral BID w/meals    Followed by     [START ON 5/28/2020] predniSONE  20 mg Oral BID w/meals     rosuvastatin  10 mg Oral Daily     senna-docusate  1 tablet Oral BID    Or     senna-docusate  2 tablet Oral BID     sodium chloride (PF)  3 mL Intracatheter Q8H     sulfamethoxazole-trimethoprim  1 tablet Oral Daily     terbutaline  10 mg Oral Q8H CHANCE     valGANciclovir  450 mg Oral Daily     vancomycin (VANCOCIN) IV  1,500 mg Intravenous Q24H               Review of Systems:      As mentioned in the HPI otherwise negative by reviewing constitutional symptoms, central and peripheral neurological systems, respiratory system, cardiac system, GI system,  system, musculoskeletal, skin, allergy, and lymphatics.                  Physical Exam:   Temp: 97.8  F (36.6  C) Temp src: Oral BP: 116/79 Pulse: 107 Heart Rate: 109 Resp: 16 SpO2: 94 % O2 Device: None (Room air)      Wt Readings from Last 4 Encounters:   05/22/20 88.9 kg (196 lb)   05/15/20 86.5 kg (190 lb 11.2 oz)   04/29/20 88.9 kg (196 lb)   12/04/19 91.3 kg (201 lb 4.8 oz)     Constitutional: awake, alert, cooperative, no apparent distress and appears at stated age, well nourished.   Neurologic: Patient is moving all extremities without focal deficit, no focal sensory loss.   Skin: no induration, fluctuation or discharge at the surgical site, and no rash            Data:   No results found for: ACD4    Inflammatory Markers    Recent Labs   Lab Test 07/23/18  0645   CRP 11.0*       Immune Globulin Studies   No lab results  found.    Metabolic Studies       Recent Labs   Lab Test 05/22/20  0745 05/21/20  1028 05/21/20  0444 05/20/20  2137 05/20/20  1606 05/20/20  0918  05/19/20  1203  05/19/20  0812 05/19/20  0324  05/12/20  2215    138 138 135 136 136   < >  --    < >  --  140   < > 138   POTASSIUM 3.2* 4.0 3.9 4.0 3.8 4.4   < >  --    < >  --  4.2   < > 4.6   CHLORIDE 107 107 107 103 104 104   < >  --    < >  --  110*   < > 107   CO2 24 21 23 25 22 22   < >  --    < >  --  24   < > 24   ANIONGAP 8 9 8 7 9 9   < >  --    < >  --  6   < > 7   BUN 41* 42* 48* 50* 48* 48*   < >  --    < >  --  38*   < > 46*   CR 1.64* 1.80* 2.10* 2.42* 2.44* 2.58*   < >  --    < >  --  2.12*   < > 2.16*   GFRESTIMATED 46* 41* 34* 28* 28* 26*   < >  --    < >  --  33*   < > 33*   * 344* 235* 266* 244* 318*   < >  --    < >  --  164*   < > 174*   A1C  --   --   --   --   --   --   --   --   --   --   --   --  8.0*   ARLENE 8.1* 7.4* 7.6* 8.0* 7.7* 7.7*   < >  --    < >  --  8.0*   < > 8.6   PHOS 1.9*  --   --   --   --   --   --   --   --   --  2.5   < >  --    MAG 2.2  --   --   --   --   --   --   --   --   --  2.4*   < >  --    LACT  --   --   --   --   --   --   --  1.6  --  2.4* 2.0   < >  --     < > = values in this interval not displayed.       Hepatic Studies    Recent Labs   Lab Test 05/22/20  0745 05/19/20  0943 05/18/20  1835 05/18/20  0857 05/18/20  0010 05/11/20 03/21/20   BILITOTAL 0.5 1.0 1.5* 1.0 0.7 0.6 1.1   ALKPHOS 31* 27* 34* 54 51 55 49   ALBUMIN 2.9* 3.0* 2.8* 3.4 3.4 4.1 4.4   AST 16 98* 112* 25 30 26 33   ALT 19 26 30 32 33 25 28   LDH  --   --   --   --   --  139 152       Pancreatitis testing    Recent Labs   Lab Test 05/18/20  0857 05/18/20  0010 08/06/18  1025   AMYLASE 56 65  --    TRIG  --   --  246*       Hematology Studies      Recent Labs   Lab Test 05/22/20  0745 05/21/20  1028 05/21/20  0444 05/20/20  2137 05/20/20  1606 05/20/20  0918 05/20/20  0338  05/18/20  0857 05/18/20  0010  07/08/19  0327 07/07/19  0349  07/06/19  0355 07/05/19  1453   WBC  --  10.6 10.0 10.7 10.6 11.2* 15.8*   < > 6.2 7.5   < > 5.9 6.5 5.9 5.6   ANEU  --   --   --   --   --   --   --   --  5.4 4.9  --  3.5 4.0 3.9 3.5   ALYM  --   --   --   --   --   --   --   --  0.7* 1.4  --  1.2 1.1 0.9 1.1   MARTHA  --   --   --   --   --   --   --   --  0.1 0.8  --  0.7 0.8 0.7 0.8   AEOS  --   --   --   --   --   --   --   --  0.0 0.3  --  0.4 0.4 0.2 0.2   HGB 8.0* 7.7* 7.6* 7.5* 7.8* 7.4* 7.6*   < > 12.0* 11.1*   < > 13.0* 12.1* 12.2* 12.2*   HCT  --  24.8* 24.4* 24.5* 24.7* 23.9* 24.5*   < > 39.2* 36.7*   < > 41.4 38.9* 39.5* 39.1*   PLT  --  167 166 154 151 149* 162   < > 251 243   < > 206 189 200 199    < > = values in this interval not displayed.       Clotting Studies    Recent Labs   Lab Test 05/18/20  1835 05/18/20  1630 05/18/20  1248 05/18/20  0010   INR 1.54* 2.21* 1.42* 2.19*   PTT 31 31 34 38*       Arterial Blood Gas Testing    Recent Labs   Lab Test 05/21/20  0756 05/21/20  0445 05/20/20  2340 05/20/20  2002  05/19/20  0943 05/19/20  0812 05/19/20  0324 05/19/20  0009  05/18/20  1835   PH  --   --   --   --   --  7.40 7.40 7.39 7.35  --  7.43   PCO2  --   --   --   --   --  27* 33* 38 39  --  33*   PO2  --   --   --   --   --  131* 124* 132* 153*  --  323*   HCO3  --   --   --   --   --  17* 21 23 21  --  22   O2PER 2L 2L 2L 21   < > 40 40  40 40  40 40  40   < > 100    < > = values in this interval not displayed.        Urine Studies     Recent Labs   Lab Test 05/18/20  0135 05/12/20  2250 12/04/19  1641 03/27/19  0910 02/20/19  0820   URINEPH 5.0 5.0 5.0 5.0 5.0   NITRITE Negative Negative Negative Negative Negative   LEUKEST Negative Negative Negative Negative Negative   WBCU 7* 3 15* 3 7*       Vancomycin Levels     Recent Labs   Lab Test 05/20/20  1145   VANCOMYCIN 15.3       Tobramycin levels     No lab results found.    Gentamicin levels    No lab results found.    Tacrolimus levels    Invalid input(s): TACROLIMUS, TAC,  TACR  Transplant Immunosuppression Labs Latest Ref Rng & Units 5/22/2020 5/21/2020 5/21/2020 5/20/2020 5/20/2020   Creat 0.66 - 1.25 mg/dL 1.64(H) 1.80(H) 2.10(H) 2.42(H) 2.44(H)   BUN 7 - 30 mg/dL 41(H) 42(H) 48(H) 50(H) 48(H)   WBC 4.0 - 11.0 10e9/L - 10.6 10.0 10.7 10.6   Neutrophil % - - - - -   ANEU 1.6 - 8.3 10e9/L - - - - -       Microbiology:  Blood cx negative to date 5/12/2020  Last check of C difficile  No results found for: CDBPCT    Virology:  CMV viral loads  No results found for: 61130, 81756, 81581, 16818, CMVQAL  CMV viral loads  No lab results found.    CMV viral loads  No results found for: CMVLOG, 17258, 28499, 92136, 29295    CMV resistance testing  No lab results found.  No results found for: CMVCID, CMVFOS, CMVGAN     No results found for: H6RES    No results found for: EBVDN, EBRES, EBVDN, EBVSP, EBVPC, EBVPCR    CMV Antibody IgG   Date Value Ref Range Status   05/18/2020 0.3 0.0 - 0.8 AI Final     Comment:     Negative  Antibody index (AI) values reflect qualitative changes in antibody   concentration that cannot be directly associated with clinical condition or   disease state.         Toxoplasma Antibody IgG   Date Value Ref Range Status   05/18/2020 <3.0 0.0 - 7.1 IU/mL Final     Comment:     Negative- Absence of detectable Toxoplasma gondii IgG antibodies. A negative   result does not rule out acute infection.  The magnitude of the measured result is not indicative of the amount of   antibody present. The concentrations of anti-Toxoplasma gondii IgG in a given   specimen determined with assays from different manufacturers can vary due to   differences in assay methods and reagent specificity.           Imaging:  CXR 5/21/2020 reviewed by myself                                                          IMPRESSION:  1. Postsurgical changes of cardiac transplantation. Stable positioning  of the support devices.  2. Trace left pleural effusion.      Tavares Rodriguez MD  Pager: [phone]  [phone]  05/22/2020

## 2020-05-22 NOTE — PLAN OF CARE
Neuro: A&O  Cardiac: Temp PM, paced/-110.  Respiratory:  Maintaining adequate O2 sats via RA  GI/: voiding via urinal, BM 5/21, loose.  Diet/appetite: Tolerating regular diet orders.   Activity:  SBA  Pain: Denies  Skin: No new deficits noted  LDA's: PIV x3, insulin gtt alg 4+. CTx5.    Plan: Nursing will continue to follow the POC and update the MD team with concerns.

## 2020-05-22 NOTE — PLAN OF CARE
6C OT Cancel. Pt fatigued, declining therapy requesting to rest and check back. Unable to check back 2/2 scheduling constraints. Rescheduled.

## 2020-05-22 NOTE — PLAN OF CARE
D: OHT 5/18/2020.    I: Monitored vitals and assessed pt status.   Changed: CT X2 (Pleural) Pulled by CVTS this AM. PICC placed.   Pacer wires capped  Running: TKO + Insulin gtt (Algorithm 4+) - L PIV Forearm  TKO + Intermittent IV Anbx & Electrolyte replacements - PIV L Hand. R PIV SL  PRN: K+ (recheck 4.4) and Phos (Recheck due at 2100) Replaced per protocol  Tele: SR  O2: RA  Mobility: 1 Assist    A: A0x4. VSS. Afebrile. Urinating adequately. Makes needs known. Very pleasant. No c/o pain, SOB, n/v/d, or chest pain. Mild discomfort around pericardial CT. Denied medications. Old DL site healing.   Plan for RHC+Biopsy Tuesday 5/26. Pt aware and agreeable to plan.   BG monitored and corrected via carb coverage and insulin gtt.     P: Continue to monitor Pt status and report changes to CVTS.    Temp:  [97.4  F (36.3  C)-98.5  F (36.9  C)] 97.8  F (36.6  C)  Pulse:  [] 107  Heart Rate:  [] 109  Resp:  [14-16] 16  BP: (108-151)/(69-96) 116/79  SpO2:  [91 %-97 %] 94 %

## 2020-05-22 NOTE — PROCEDURES
[facility], [place]    Double Lumen PICC Placement    Date/Time: 5/22/2020 4:18 PM  Performed by: Kimberly Hdz RN  Authorized by: Guillermo Ortiz PA   Indications: antibiotics.    UNIVERSAL PROTOCOL   Site Marked: Yes  Prior Images Obtained and Reviewed:  Yes  Required items: Required blood products, implants, devices and special equipment available    Patient identity confirmed:  Verbally with patient and arm band  NA - No sedation, light sedation, or local anesthesia  Confirmation Checklist:  Patient's identity using two indicators, relevant allergies, procedure was appropriate and matched the consent or emergent situation and correct equipment/implants were available  Time out: Immediately prior to the procedure a time out was called    Universal Protocol: the Joint Commission Universal Protocol was followed    Preparation: Patient was prepped and draped in usual sterile fashion           ANESTHESIA    Anesthesia: See MAR for details  Local Anesthetic:  Lidocaine 1% without epinephrine  Anesthetic Total (mL):  4      SEDATION    Patient Sedated: No        Preparation: skin prepped with ChloraPrep  Skin prep agent: skin prep agent completely dried prior to procedure  Sterile barriers: maximum sterile barriers were used: cap, mask, sterile gown, sterile gloves, and large sterile sheet  Hand hygiene: hand hygiene performed prior to central venous catheter insertion  Type of line used: Power PICC  Catheter type: double lumen  Lumen type: valved  Catheter size: 5 Fr  : Bioflo.  Lot number: [id number]  Placement method: venipuncture, MST, ultrasound and tip confirmation system  Number of attempts: 1  Successful placement: yes  Orientation: right  Location: basilic vein (vein diameter- 0.64cm)  Arm circumference: adults 10 cm  Extremity circumference: 33  Visible catheter length: 0  Total catheter length: 38  Dressing and securement: blood cleaned with CHG, site cleaned, statlock,  sterile dressing applied and chlorhexidine disc applied  Post procedure assessment: blood return through all ports and placement verified by x-ray  PROCEDURE   Patient Tolerance:  Patient tolerated the procedure well with no immediate complications

## 2020-05-22 NOTE — PLAN OF CARE
D/I/A: Pt resting between cares and treatments.  A+O x4 and able to make needs known.  VSSA.  Temp PPM connected to patient and V-pacing.  Denies pain or sob.  CTs patent and to sxn with no air leak noted.  Insulin gtt monitored and maintained.    P: Continue to monitor status.

## 2020-05-22 NOTE — PROGRESS NOTES
IP Diabetes Management  Daily Note           Assessment and Plan:   HPI: Red is a 59 year old male with PMHx TIIDM, HTN, HLP, ischemic heart disease s/p STEMI with ALYSSA 2019, LVAD placement, CKD III and obesity who was admitted 5/17/20 for heart transplant. He underwent heart transplant on 5/18/20 with Dr. Willis. Donor blood cuultures and respiratory cultures returned positive for H flu, staph and strep.    Assessment:   1)Type II Diabetes Mellitus, un controlled (A1c 8.0%), with steroid induced hyperglycemia following heart transplant      Plan:    -tonight, administer NPH insulin 50 units and continue IV insulin (conservative (~70%) estimate of basal need while on steroids, but will reassess IV insulin rates with this dose on board)    -continue IV insulin today, possible transition off tomorrow with a morning dose of NPH.   -aspart increased to 1:7, then 1:4g CHO coverage with meals and snacks/supplements   -BG monitoring q1-2 h per IV insulin protocol   -hypoglycemia protocol   -regular diet with carb counting protocol    Outpatient follow up: intervally with [facility], then recommend establishing with endocrinologist close to home.     Plan discussed with patient,and paged primary team.        Interval History and Assessment: interval glucose trend reviewed:  Overnight, IV insulin averaging ~7 units per hour with 40 of Prednisone on board (>150 units basal requirement daily)    Prednisone tapered to 35 units this AM.   Prandially, trended to 200's and up to 10-15 units per hour with 1:7g CHO, thus have increased to 1:4g subsequently, to begin with dinner.     Ordered NPH 50 units (two doses would be ~70% of estimated 140 total basal units daily requirement, utilizing overnight average 6 units per hour); will review IV insulin needs with this dose on board to facilitate transition off IV as soon as tomorrow morning.     Cr 2.9>1.6, which may also be translating to higher insulin needs (incresaed rate  of renal clearance of insulin).     He had 2 chest tubes removed this morning, happy about that. Does not like hourly fingerstick checks. Eating well.     Current nutritional intake and type: Orders Placed This Encounter      Regular Diet Adult    Steroid planning:   PO Prednisone 40mg BID 5/20-5/21  PO Prednisone 35 mg BID 5/22-5/23  PO Prednisone 30 mg BID 5/24-5/25  PO Prednisone 25 mg BID 5/26-5/27  PO Prednisone 20 mg BID 5/28-?    PTA Diabetes Regimen:   BG monitoring four times daily  Lantus 35 units at bedtime  Novolog 1 unt per 7g CHO with meals/snacks  Novolog 1/25>140AC, >200 HS    Discharge Planning: TBD           Diabetes History:   Type of Diabetes: Type 2 Diabetes Mellitus, Steroid Induced Diabetes Mellitus, stress hyperglycemia  Lab Results   Component Value Date    A1C 8.0 05/12/2020    A1C 9.5 07/05/2019    A1C 9.4 01/11/2019    A1C 10.1 12/30/2018    A1C 10.6 12/20/2018              Review of Systems:   The Review of Systems is negative other than noted in the Interval History.           Medications:     Current Facility-Administered Medications   Medication     acetaminophen (TYLENOL) tablet 650 mg     aspirin EC tablet 81 mg     dextrose 10% infusion     glucose gel 15-30 g    Or     dextrose 50 % injection 25-50 mL    Or     glucagon injection 1 mg     diphenhydrAMINE (BENADRYL) capsule 25 mg    Or     diphenhydrAMINE (BENADRYL) injection 25 mg     heparin ANTICOAGULANT injection 5,000 Units     heparin lock flush 10 UNIT/ML injection 2-5 mL     HYDROmorphone (PF) (DILAUDID) injection 0.3-0.5 mg     insulin 1 unit/mL in saline (NovoLIN, HumuLIN Regular) drip - ADULT IV Infusion     insulin aspart (NovoLOG) injection (RAPID ACTING)     insulin aspart (NovoLOG) injection (RAPID ACTING)     lidocaine (LMX4) cream     lidocaine (LMX4) cream     lidocaine 1 % 0.1-1 mL     lidocaine 1 % 0.1-1 mL     magnesium sulfate 2 g in water intermittent infusion     magnesium sulfate 4 g in 100 mL sterile water  (premade)     melatonin tablet 1 mg     methocarbamol (ROBAXIN) tablet 750 mg     metoclopramide (REGLAN) tablet 10 mg    Or     metoclopramide (REGLAN) injection 10 mg     [START ON 5/23/2020] metroNIDAZOLE (FLAGYL) tablet 500 mg     mycophenolate (GENERIC EQUIVALENT) tablet 1,500 mg     naloxone (NARCAN) injection 0.1-0.4 mg     nystatin (MYCOSTATIN) suspension 1,000,000 Units     ondansetron (ZOFRAN-ODT) ODT tab 4 mg    Or     ondansetron (ZOFRAN) injection 4 mg     oxyCODONE (ROXICODONE) tablet 5-10 mg     pantoprazole (PROTONIX) EC tablet 40 mg     piperacillin-tazobactam (ZOSYN) 3.375 g vial to attach to  mL bag     potassium chloride (KLOR-CON) Packet 20-40 mEq     potassium chloride 10 mEq in 100 mL intermittent infusion with 10 mg lidocaine     potassium chloride 10 mEq in 100 mL sterile water intermittent infusion (premix)     potassium chloride 20 mEq in 50 mL intermittent infusion     potassium chloride ER (KLOR-CON M) CR tablet 20-40 mEq     potassium phosphate 10 mmol in D5W 250 mL intermittent infusion     potassium phosphate 15 mmol in D5W 250 mL intermittent infusion     potassium phosphate 20 mmol in D5W 250 mL intermittent infusion     potassium phosphate 20 mmol in D5W 500 mL intermittent infusion     potassium phosphate 25 mmol in D5W 500 mL intermittent infusion     predniSONE (DELTASONE) tablet 35 mg    Followed by     [START ON 5/24/2020] predniSONE (DELTASONE) tablet 30 mg    Followed by     [START ON 5/26/2020] predniSONE (DELTASONE) tablet 25 mg    Followed by     [START ON 5/28/2020] predniSONE (DELTASONE) tablet 20 mg     prochlorperazine (COMPAZINE) injection 10 mg    Or     prochlorperazine (COMPAZINE) tablet 10 mg     Reason beta blocker order not selected     rosuvastatin (CRESTOR) tablet 10 mg     senna-docusate (SENOKOT-S/PERICOLACE) 8.6-50 MG per tablet 1 tablet    Or     senna-docusate (SENOKOT-S/PERICOLACE) 8.6-50 MG per tablet 2 tablet     sennosides (SENOKOT) tablet 8.6  "mg     sodium chloride (PF) 0.9% PF flush 3 mL     sodium chloride (PF) 0.9% PF flush 3 mL     sodium chloride (PF) 0.9% PF flush 5-50 mL     sodium chloride 0.9% infusion     sulfamethoxazole-trimethoprim (BACTRIM) 400-80 MG per tablet 1 tablet     terbutaline (BRETHINE) tablet 10 mg     valGANciclovir (VALCYTE) tablet 450 mg     vancomycin 1500 mg in 0.9% NaCl 250 ml intermittent infusion 1,500 mg            Physical Exam:    /79 (BP Location: Left arm)   Pulse 107   Temp 97.8  F (36.6  C) (Oral)   Resp 16   Ht 1.626 m (5' 4\")   Wt 88.9 kg (196 lb)   SpO2 94%   BMI 33.64 kg/m    General: pleasant, in no distress. Sitting up in chair   HEENT: normocephalic, atraumatic. Oral mucous membranes moist.   Lungs: unlabored respiration, no cough  ABD: rounded, soft, no lipodystrophy noted  Skin: warm and dry, no obvious lesions  MSK:  moves all extremities  Lymp:  no LE edema   Mental status:  alert, oriented to self, place, time  Psych:  affect, calm and appropriate interaction             Data:     Recent Labs   Lab 05/22/20  1417 05/22/20  1319 05/22/20  1207 05/22/20  1115 05/22/20  1002 05/22/20  0903  05/22/20  0745  05/21/20  1028  05/21/20  0444  05/20/20  2137 05/20/20  1606  05/20/20  0918   GLC  --   --   --   --   --   --   --  191*  --  344*  --  235*  --  266* 244*  --  318*   * 241* 161* 177* 205* 192*   < >  --    < >  --    < >  --    < >  --   --    < >  --     < > = values in this interval not displayed.     Lab Results   Component Value Date    WBC 10.6 05/21/2020    WBC 10.0 05/21/2020    WBC 10.7 05/20/2020    HGB 8.0 (L) 05/22/2020    HGB 7.7 (L) 05/21/2020    HGB 7.6 (L) 05/21/2020    HCT 24.8 (L) 05/21/2020    HCT 24.4 (L) 05/21/2020    HCT 24.5 (L) 05/20/2020    MCV 83 05/21/2020    MCV 83 05/21/2020    MCV 83 05/20/2020     05/21/2020     05/21/2020     05/20/2020     Lab Results   Component Value Date     05/22/2020     05/21/2020     " 05/21/2020    POTASSIUM 4.4 05/22/2020    POTASSIUM 3.2 (L) 05/22/2020    POTASSIUM 4.0 05/21/2020    CHLORIDE 107 05/22/2020    CHLORIDE 107 05/21/2020    CHLORIDE 107 05/21/2020    CO2 24 05/22/2020    CO2 21 05/21/2020    CO2 23 05/21/2020     (H) 05/22/2020     (H) 05/21/2020     (H) 05/21/2020     Lab Results   Component Value Date    BUN 41 (H) 05/22/2020    BUN 42 (H) 05/21/2020    BUN 48 (H) 05/21/2020     Lab Results   Component Value Date    TSH 3.94 06/26/2019    TSH 6.91 (H) 07/23/2018     Lab Results   Component Value Date    AST 16 05/22/2020    AST 98 (H) 05/19/2020     (H) 05/18/2020    ALT 19 05/22/2020    ALT 26 05/19/2020    ALT 30 05/18/2020    ALKPHOS 31 (L) 05/22/2020    ALKPHOS 27 (L) 05/19/2020    ALKPHOS 34 (L) 05/18/2020       Diabetes Management Team job code: [id number]  I spent a total of 25 minutes bedside and on the inpatient unit managing glycemic care. Over 50% of my time on the unit was spent counseling the patient and/or coordinating care regarding acute hyperglycemia management.  See note for details.    Juana Ly PA-C  Inpatient Diabetes Management Service  Pager [phone]

## 2020-05-22 NOTE — PROGRESS NOTES
Notification e-mail/call of patient's transplant went out to HIM, LVAD Coordinator, Era, Kerline/Abraham and Research Coordinators.    Notification e-mail of patient's transplant went out to cardiologist Dr. Ortiz along with information of Patient's referring cardiologist.    Date of Transplant: 5/18/2020    E-mail was sent out on 5/22/2020

## 2020-05-23 ENCOUNTER — APPOINTMENT (OUTPATIENT)
Dept: GENERAL RADIOLOGY | Facility: CLINIC | Age: 60
DRG: 001 | End: 2020-05-23
Attending: PHYSICIAN ASSISTANT
Payer: COMMERCIAL

## 2020-05-23 ENCOUNTER — APPOINTMENT (OUTPATIENT)
Dept: PHYSICAL THERAPY | Facility: CLINIC | Age: 60
DRG: 001 | End: 2020-05-23
Attending: THORACIC SURGERY (CARDIOTHORACIC VASCULAR SURGERY)
Payer: COMMERCIAL

## 2020-05-23 ENCOUNTER — APPOINTMENT (OUTPATIENT)
Dept: CARDIOLOGY | Facility: CLINIC | Age: 60
DRG: 001 | End: 2020-05-23
Attending: NURSE PRACTITIONER
Payer: COMMERCIAL

## 2020-05-23 LAB
ANION GAP SERPL CALCULATED.3IONS-SCNC: 7 MMOL/L (ref 3–14)
ANION GAP SERPL CALCULATED.3IONS-SCNC: 8 MMOL/L (ref 3–14)
BUN SERPL-MCNC: 31 MG/DL (ref 7–30)
BUN SERPL-MCNC: 35 MG/DL (ref 7–30)
CALCIUM SERPL-MCNC: 7.9 MG/DL (ref 8.5–10.1)
CALCIUM SERPL-MCNC: 8.2 MG/DL (ref 8.5–10.1)
CHLORIDE SERPL-SCNC: 110 MMOL/L (ref 94–109)
CHLORIDE SERPL-SCNC: 110 MMOL/L (ref 94–109)
CO2 SERPL-SCNC: 22 MMOL/L (ref 20–32)
CO2 SERPL-SCNC: 23 MMOL/L (ref 20–32)
CREAT SERPL-MCNC: 1.32 MG/DL (ref 0.66–1.25)
CREAT SERPL-MCNC: 1.42 MG/DL (ref 0.66–1.25)
ERYTHROCYTE [DISTWIDTH] IN BLOOD BY AUTOMATED COUNT: 17 % (ref 10–15)
GFR SERPL CREATININE-BSD FRML MDRD: 54 ML/MIN/{1.73_M2}
GFR SERPL CREATININE-BSD FRML MDRD: 59 ML/MIN/{1.73_M2}
GLUCOSE BLDC GLUCOMTR-MCNC: 100 MG/DL (ref 70–99)
GLUCOSE BLDC GLUCOMTR-MCNC: 107 MG/DL (ref 70–99)
GLUCOSE BLDC GLUCOMTR-MCNC: 110 MG/DL (ref 70–99)
GLUCOSE BLDC GLUCOMTR-MCNC: 120 MG/DL (ref 70–99)
GLUCOSE BLDC GLUCOMTR-MCNC: 132 MG/DL (ref 70–99)
GLUCOSE BLDC GLUCOMTR-MCNC: 140 MG/DL (ref 70–99)
GLUCOSE BLDC GLUCOMTR-MCNC: 143 MG/DL (ref 70–99)
GLUCOSE BLDC GLUCOMTR-MCNC: 154 MG/DL (ref 70–99)
GLUCOSE BLDC GLUCOMTR-MCNC: 161 MG/DL (ref 70–99)
GLUCOSE BLDC GLUCOMTR-MCNC: 166 MG/DL (ref 70–99)
GLUCOSE BLDC GLUCOMTR-MCNC: 167 MG/DL (ref 70–99)
GLUCOSE BLDC GLUCOMTR-MCNC: 168 MG/DL (ref 70–99)
GLUCOSE BLDC GLUCOMTR-MCNC: 171 MG/DL (ref 70–99)
GLUCOSE BLDC GLUCOMTR-MCNC: 173 MG/DL (ref 70–99)
GLUCOSE BLDC GLUCOMTR-MCNC: 173 MG/DL (ref 70–99)
GLUCOSE BLDC GLUCOMTR-MCNC: 179 MG/DL (ref 70–99)
GLUCOSE BLDC GLUCOMTR-MCNC: 180 MG/DL (ref 70–99)
GLUCOSE BLDC GLUCOMTR-MCNC: 182 MG/DL (ref 70–99)
GLUCOSE BLDC GLUCOMTR-MCNC: 200 MG/DL (ref 70–99)
GLUCOSE BLDC GLUCOMTR-MCNC: 208 MG/DL (ref 70–99)
GLUCOSE BLDC GLUCOMTR-MCNC: 237 MG/DL (ref 70–99)
GLUCOSE BLDC GLUCOMTR-MCNC: 77 MG/DL (ref 70–99)
GLUCOSE BLDC GLUCOMTR-MCNC: 82 MG/DL (ref 70–99)
GLUCOSE BLDC GLUCOMTR-MCNC: 92 MG/DL (ref 70–99)
GLUCOSE SERPL-MCNC: 173 MG/DL (ref 70–99)
GLUCOSE SERPL-MCNC: 181 MG/DL (ref 70–99)
HCT VFR BLD AUTO: 26.1 % (ref 40–53)
HGB BLD-MCNC: 7.7 G/DL (ref 13.3–17.7)
MAGNESIUM SERPL-MCNC: 1.8 MG/DL (ref 1.6–2.3)
MAGNESIUM SERPL-MCNC: 2.5 MG/DL (ref 1.6–2.3)
MCH RBC QN AUTO: 25.8 PG (ref 26.5–33)
MCHC RBC AUTO-ENTMCNC: 29.5 G/DL (ref 31.5–36.5)
MCV RBC AUTO: 87 FL (ref 78–100)
PHOSPHATE SERPL-MCNC: 2.9 MG/DL (ref 2.5–4.5)
PLATELET # BLD AUTO: 216 10E9/L (ref 150–450)
POTASSIUM SERPL-SCNC: 3.8 MMOL/L (ref 3.4–5.3)
POTASSIUM SERPL-SCNC: 3.8 MMOL/L (ref 3.4–5.3)
RBC # BLD AUTO: 2.99 10E12/L (ref 4.4–5.9)
SODIUM SERPL-SCNC: 140 MMOL/L (ref 133–144)
SODIUM SERPL-SCNC: 140 MMOL/L (ref 133–144)
WBC # BLD AUTO: 6.7 10E9/L (ref 4–11)

## 2020-05-23 PROCEDURE — 97116 GAIT TRAINING THERAPY: CPT | Mod: GP

## 2020-05-23 PROCEDURE — 83735 ASSAY OF MAGNESIUM: CPT | Performed by: THORACIC SURGERY (CARDIOTHORACIC VASCULAR SURGERY)

## 2020-05-23 PROCEDURE — 25000128 H RX IP 250 OP 636: Performed by: PHYSICIAN ASSISTANT

## 2020-05-23 PROCEDURE — 25000132 ZZH RX MED GY IP 250 OP 250 PS 637: Performed by: SURGERY

## 2020-05-23 PROCEDURE — 25000131 ZZH RX MED GY IP 250 OP 636 PS 637: Performed by: NURSE PRACTITIONER

## 2020-05-23 PROCEDURE — 25000128 H RX IP 250 OP 636: Performed by: THORACIC SURGERY (CARDIOTHORACIC VASCULAR SURGERY)

## 2020-05-23 PROCEDURE — 36592 COLLECT BLOOD FROM PICC: CPT | Performed by: SURGERY

## 2020-05-23 PROCEDURE — 25000125 ZZHC RX 250: Performed by: STUDENT IN AN ORGANIZED HEALTH CARE EDUCATION/TRAINING PROGRAM

## 2020-05-23 PROCEDURE — 25500064 ZZH RX 255 OP 636: Performed by: INTERNAL MEDICINE

## 2020-05-23 PROCEDURE — 25800030 ZZH RX IP 258 OP 636: Performed by: THORACIC SURGERY (CARDIOTHORACIC VASCULAR SURGERY)

## 2020-05-23 PROCEDURE — 36592 COLLECT BLOOD FROM PICC: CPT | Performed by: PHYSICIAN ASSISTANT

## 2020-05-23 PROCEDURE — 21400000 ZZH R&B CCU UMMC

## 2020-05-23 PROCEDURE — 93010 ELECTROCARDIOGRAM REPORT: CPT | Mod: 59 | Performed by: INTERNAL MEDICINE

## 2020-05-23 PROCEDURE — 80048 BASIC METABOLIC PNL TOTAL CA: CPT | Performed by: THORACIC SURGERY (CARDIOTHORACIC VASCULAR SURGERY)

## 2020-05-23 PROCEDURE — 71045 X-RAY EXAM CHEST 1 VIEW: CPT

## 2020-05-23 PROCEDURE — 71046 X-RAY EXAM CHEST 2 VIEWS: CPT

## 2020-05-23 PROCEDURE — 00000146 ZZHCL STATISTIC GLUCOSE BY METER IP

## 2020-05-23 PROCEDURE — 25000132 ZZH RX MED GY IP 250 OP 250 PS 637: Performed by: PHYSICIAN ASSISTANT

## 2020-05-23 PROCEDURE — 25000132 ZZH RX MED GY IP 250 OP 250 PS 637: Performed by: THORACIC SURGERY (CARDIOTHORACIC VASCULAR SURGERY)

## 2020-05-23 PROCEDURE — 25000132 ZZH RX MED GY IP 250 OP 250 PS 637: Performed by: STUDENT IN AN ORGANIZED HEALTH CARE EDUCATION/TRAINING PROGRAM

## 2020-05-23 PROCEDURE — 93306 TTE W/DOPPLER COMPLETE: CPT | Mod: 26 | Performed by: INTERNAL MEDICINE

## 2020-05-23 PROCEDURE — 99207 ZZC NO BILLABLE SERVICE THIS VISIT: CPT | Performed by: NURSE PRACTITIONER

## 2020-05-23 PROCEDURE — 25000128 H RX IP 250 OP 636: Performed by: SURGERY

## 2020-05-23 PROCEDURE — 25000125 ZZHC RX 250: Performed by: PHYSICIAN ASSISTANT

## 2020-05-23 PROCEDURE — 80048 BASIC METABOLIC PNL TOTAL CA: CPT | Performed by: SURGERY

## 2020-05-23 PROCEDURE — 25000131 ZZH RX MED GY IP 250 OP 636 PS 637: Performed by: PHYSICIAN ASSISTANT

## 2020-05-23 PROCEDURE — 99232 SBSQ HOSP IP/OBS MODERATE 35: CPT | Mod: 25 | Performed by: INTERNAL MEDICINE

## 2020-05-23 PROCEDURE — 97110 THERAPEUTIC EXERCISES: CPT | Mod: GP

## 2020-05-23 PROCEDURE — 83735 ASSAY OF MAGNESIUM: CPT | Performed by: SURGERY

## 2020-05-23 PROCEDURE — 25000132 ZZH RX MED GY IP 250 OP 250 PS 637: Performed by: NURSE PRACTITIONER

## 2020-05-23 PROCEDURE — 85027 COMPLETE CBC AUTOMATED: CPT | Performed by: PHYSICIAN ASSISTANT

## 2020-05-23 PROCEDURE — 93005 ELECTROCARDIOGRAM TRACING: CPT

## 2020-05-23 PROCEDURE — 84100 ASSAY OF PHOSPHORUS: CPT | Performed by: THORACIC SURGERY (CARDIOTHORACIC VASCULAR SURGERY)

## 2020-05-23 PROCEDURE — 25800030 ZZH RX IP 258 OP 636: Performed by: PHYSICIAN ASSISTANT

## 2020-05-23 RX ORDER — VALGANCICLOVIR 450 MG/1
900 TABLET, FILM COATED ORAL DAILY
Status: DISCONTINUED | OUTPATIENT
Start: 2020-05-24 | End: 2020-05-29

## 2020-05-23 RX ORDER — POTASSIUM CHLORIDE 750 MG/1
20 TABLET, EXTENDED RELEASE ORAL ONCE
Status: COMPLETED | OUTPATIENT
Start: 2020-05-23 | End: 2020-05-23

## 2020-05-23 RX ORDER — TACROLIMUS 0.5 MG/1
0.5 CAPSULE ORAL
Status: DISCONTINUED | OUTPATIENT
Start: 2020-05-23 | End: 2020-05-25

## 2020-05-23 RX ORDER — TERBUTALINE SULFATE 5 MG/1
5 TABLET ORAL EVERY 8 HOURS SCHEDULED
Status: DISCONTINUED | OUTPATIENT
Start: 2020-05-23 | End: 2020-05-23

## 2020-05-23 RX ADMIN — HEPARIN SODIUM 5000 UNITS: 5000 INJECTION, SOLUTION INTRAVENOUS; SUBCUTANEOUS at 23:07

## 2020-05-23 RX ADMIN — METRONIDAZOLE 500 MG: 500 TABLET ORAL at 15:24

## 2020-05-23 RX ADMIN — NYSTATIN 1000000 UNITS: 100000 SUSPENSION ORAL at 17:15

## 2020-05-23 RX ADMIN — VALGANCICLOVIR 450 MG: 450 TABLET, FILM COATED ORAL at 09:01

## 2020-05-23 RX ADMIN — ROSUVASTATIN CALCIUM 10 MG: 10 TABLET, FILM COATED ORAL at 09:00

## 2020-05-23 RX ADMIN — POTASSIUM CHLORIDE 20 MEQ: 750 TABLET, EXTENDED RELEASE ORAL at 04:11

## 2020-05-23 RX ADMIN — NYSTATIN 1000000 UNITS: 100000 SUSPENSION ORAL at 13:00

## 2020-05-23 RX ADMIN — POTASSIUM CHLORIDE 20 MEQ: 750 TABLET, EXTENDED RELEASE ORAL at 13:00

## 2020-05-23 RX ADMIN — NYSTATIN 1000000 UNITS: 100000 SUSPENSION ORAL at 08:58

## 2020-05-23 RX ADMIN — PANTOPRAZOLE SODIUM 40 MG: 40 TABLET, DELAYED RELEASE ORAL at 09:02

## 2020-05-23 RX ADMIN — ASPIRIN 81 MG: 81 TABLET, COATED ORAL at 09:01

## 2020-05-23 RX ADMIN — SULFAMETHOXAZOLE AND TRIMETHOPRIM 1 TABLET: 400; 80 TABLET ORAL at 09:00

## 2020-05-23 RX ADMIN — HUMAN INSULIN 9 UNITS/HR: 100 INJECTION, SOLUTION SUBCUTANEOUS at 08:56

## 2020-05-23 RX ADMIN — PREDNISONE 35 MG: 5 TABLET ORAL at 18:14

## 2020-05-23 RX ADMIN — HYDROMORPHONE HYDROCHLORIDE 0.5 MG: 1 INJECTION, SOLUTION INTRAMUSCULAR; INTRAVENOUS; SUBCUTANEOUS at 12:21

## 2020-05-23 RX ADMIN — MAGNESIUM SULFATE IN WATER 2 G: 40 INJECTION, SOLUTION INTRAVENOUS at 02:57

## 2020-05-23 RX ADMIN — INSULIN ASPART 13 UNITS: 100 INJECTION, SOLUTION INTRAVENOUS; SUBCUTANEOUS at 13:20

## 2020-05-23 RX ADMIN — POTASSIUM PHOSPHATE, MONOBASIC AND POTASSIUM PHOSPHATE, DIBASIC 20 MMOL: 224; 236 INJECTION, SOLUTION INTRAVENOUS at 00:00

## 2020-05-23 RX ADMIN — MYCOPHENOLATE MOFETIL 1500 MG: 500 TABLET ORAL at 09:00

## 2020-05-23 RX ADMIN — METRONIDAZOLE 500 MG: 500 TABLET ORAL at 20:05

## 2020-05-23 RX ADMIN — HEPARIN SODIUM 5000 UNITS: 5000 INJECTION, SOLUTION INTRAVENOUS; SUBCUTANEOUS at 05:15

## 2020-05-23 RX ADMIN — HUMAN INSULIN 7 UNITS/HR: 100 INJECTION, SOLUTION SUBCUTANEOUS at 05:05

## 2020-05-23 RX ADMIN — INSULIN ASPART 17 UNITS: 100 INJECTION, SOLUTION INTRAVENOUS; SUBCUTANEOUS at 18:43

## 2020-05-23 RX ADMIN — VANCOMYCIN HYDROCHLORIDE 1750 MG: 10 INJECTION, POWDER, LYOPHILIZED, FOR SOLUTION INTRAVENOUS at 18:23

## 2020-05-23 RX ADMIN — PREDNISONE 35 MG: 5 TABLET ORAL at 09:01

## 2020-05-23 RX ADMIN — MYCOPHENOLATE MOFETIL 1500 MG: 500 TABLET ORAL at 20:05

## 2020-05-23 RX ADMIN — TERBUTALINE SULFATE 5 MG: 5 TABLET ORAL at 13:00

## 2020-05-23 RX ADMIN — TACROLIMUS 0.5 MG: 0.5 CAPSULE ORAL at 18:15

## 2020-05-23 RX ADMIN — NYSTATIN 1000000 UNITS: 100000 SUSPENSION ORAL at 20:05

## 2020-05-23 RX ADMIN — INSULIN ASPART 12 UNITS: 100 INJECTION, SOLUTION INTRAVENOUS; SUBCUTANEOUS at 08:17

## 2020-05-23 RX ADMIN — TERBUTALINE SULFATE 10 MG: 5 TABLET ORAL at 04:12

## 2020-05-23 RX ADMIN — HEPARIN SODIUM 5000 UNITS: 5000 INJECTION, SOLUTION INTRAVENOUS; SUBCUTANEOUS at 15:25

## 2020-05-23 RX ADMIN — HUMAN ALBUMIN MICROSPHERES AND PERFLUTREN 6 ML: 10; .22 INJECTION, SOLUTION INTRAVENOUS at 08:45

## 2020-05-23 RX ADMIN — METRONIDAZOLE 500 MG: 500 TABLET ORAL at 09:01

## 2020-05-23 RX ADMIN — HUMAN INSULIN 3 UNITS/HR: 100 INJECTION, SOLUTION SUBCUTANEOUS at 13:08

## 2020-05-23 RX ADMIN — HUMAN INSULIN 7 UNITS/HR: 100 INJECTION, SOLUTION SUBCUTANEOUS at 01:57

## 2020-05-23 ASSESSMENT — ACTIVITIES OF DAILY LIVING (ADL)
ADLS_ACUITY_SCORE: 15

## 2020-05-23 ASSESSMENT — MIFFLIN-ST. JEOR: SCORE: 1631.4

## 2020-05-23 NOTE — PROGRESS NOTES
Cardiovascular Surgery Progress Note  5/23/2020         Assessment and Plan:     Mariusz Sharp is a 59 year old male history of CAD (s/p STEMI with ALYSSA to LAD 6/27/18), LV thrombus, CKD Stage III, PAF, DM Type II (on insulin), and ICM with EF 20-25% s/p HeartMate 3 LVAD implant 12/31/18, he presented from home (COVID negative) on 5/17/20 with an offer for a donor organ and had an orthotopic heart transplant with removal of ICD with Dr Willis and Dr Baker on 5/18.  He did well in the ICU and was quickly weaned off pressors and extubated POD #1 to 2 lpm via NC with neuro status intact. Endocrine was consulted for transition off of the insulin gtt. ID was consulted after the donor's sputum and blood had growth. He responded well to gentle diuresis before transferring out of the ICU.      Cardiovascular:   Hx VT, systolic HF, AF, and ICM. S/P LVAD Dec, 2018.     S/p orthotopic heart transplant   - No arrhythmia until 5/23 with nonsustained VT, HD stable.  - Decreased Terbutaline 5 mg TID. 's.    - Okay to start ASA 81 mg and rosuvastatin   - Cards dosing immunosuppression, Simulect given 5/22.  Chest tubes: R and L pleural tubes with pericardial drain  TPW: capped 5/22      Pulmonary:  - Extubated POD 1 to 2 lpm via NC; Saturating well on RA.   - Supplemental O2 PRN to keep sats > 92%. Wean off as tolerated.  - Pulm toilet, IS, activity and deep breathing     Neurology / MSK:  - Neuro intact  - Acute post-operative pain; pain well controlled with acetaminophen, PO oxycodone PRN, IV dilaudid PRN      / Renal:  - Hx stage III renal disease. Most recent creatinine 1.32 and improving, adequate UOP.  - Diuresis per Cardiology.      GI / FEN:   - Regular diet, continue bowel regimen  - Replace electrolytes as needed, hepatic enzymes WNL.     Endocrine:  T2 DM on insulin   - Appreciate Endocrine following, currently on insulin gtt and prandial dosing     Infectious Disease:  At risk for donor-derived infection  - ID  "was consulted 5/21 when the donor blood grew S anginosus and Veillonella sp.  The S anginosus is usually susceptible to cephalosporins and Veillonella to flagyl. After the routine zosyn (ends 5/22), recommended continuing Vancomycin until 6/1 and start flagyl 500 mg tid PO until 5/25.   - The S aureus and the H influenza in the sputum of the donor are of no clinical value in this recipient of the donor's heart.    - WBC WNL, remains afebrile, no signs or symptoms of infection.   - Packing Driveline site BID with moistened Nugauze strips      Hematology:   - Acute blood loss anemia and thrombocytopenia  - Hgb 8.0; Plt WNL, no signs or symptoms of active bleeding     Anticoagulation:   - ASA only      Prophylaxis:   - Stress ulcer prophylaxis: Pantoprazole 40 mg daily for 30 days  - DVT prophylaxis: Subcutaneous heparin, SCD     Disposition:   - Transferred to  on 5/21  - Rehab recommending discharge to Home with outpt rehab      Discussed with CVTS Fellow as needed.  Staff surgeons have been informed of changes through both verbal and written communication.      Guillermo Ortiz PA-C  Cardiothoracic Surgery  Pager [phone]    11:39 AM   May 23, 2020          Interval History:     Overnight events- about 15 min of tachy arrhythmia, discussed with Cards, resolved.  States pain is well managed on current regimen. Slept well overnight but hates q 1 hr glucose checks.   Tolerating diet, is passing flatus, + BM. No nausea or vomiting.  Breathing well without complaints.   Working with therapies and ambulating in halls with assistance.   Denies chest pain, palpitations, dizziness, syncopal symptoms, fevers, chills, myalgias, or sternal popping/clicking.         Physical Exam:   Blood pressure 129/80, pulse 109, temperature 98.3  F (36.8  C), temperature source Oral, resp. rate 18, height 1.626 m (5' 4\"), weight 89.5 kg (197 lb 6.4 oz), SpO2 96 %.  Vitals:    05/21/20 0400 05/22/20 0655 05/23/20 0234   Weight: 90.8 kg " (200 lb 2.8 oz) 88.9 kg (196 lb) 89.5 kg (197 lb 6.4 oz)      Weight; + 2 kg since admit and trending up.   24 hr Fluid status; net loss 700 mL.   MAPs: 89 - 107     Gen: A&Ox4, NAD  Neuro: no focal deficits   CV: sinus tachy, normal S1 S2, no murmurs, rubs or gallops.   Pulm: CTA, no wheezing or rhonchi, normal breathing on RA  Abd: nondistended, normal BS, soft, nontender  Ext: no periperal edema  Incision: clean, dry, intact, no erythema, sternum stable  Tubes/drain sites: dressing clean and dry, serosanguinous output, no air leak.     * removed L and R pleural tubes without immediate complication   Driveline site: packed, dressing soiled         Data:    Imaging:  reviewed recent imaging, no acute concerns    Labs:  BMP  Recent Labs   Lab 05/23/20  0619 05/23/20  0126 05/22/20  1316 05/22/20  0745 05/21/20  1028    140  --  139 138   POTASSIUM 3.8 3.8 4.4 3.2* 4.0   CHLORIDE 110* 110*  --  107 107   ARLENE 8.2* 7.9*  --  8.1* 7.4*   CO2 22 23  --  24 21   BUN 31* 35*  --  41* 42*   CR 1.32* 1.42*  --  1.64* 1.80*   * 173*  --  191* 344*     CBC  Recent Labs   Lab 05/23/20  0619 05/22/20  0745 05/21/20  1028 05/21/20  0444 05/20/20  2137   WBC 6.7  --  10.6 10.0 10.7   RBC 2.99*  --  2.98* 2.94* 2.95*   HGB 7.7* 8.0* 7.7* 7.6* 7.5*   HCT 26.1*  --  24.8* 24.4* 24.5*   MCV 87  --  83 83 83   MCH 25.8*  --  25.8* 25.9* 25.4*   MCHC 29.5*  --  31.0* 31.1* 30.6*   RDW 17.0*  --  16.7* 16.4* 16.4*     --  167 166 154     INR  Recent Labs   Lab 05/18/20  1835 05/18/20  1630 05/18/20  1248 05/18/20  0010   INR 1.54* 2.21* 1.42* 2.19*      Liver Function Studies -   Recent Labs   Lab Test 05/22/20  0745   PROTTOTAL 5.7*   ALBUMIN 2.9*   BILITOTAL 0.5   ALKPHOS 31*   AST 16   ALT 19     GLUCOSE:   Recent Labs   Lab 05/23/20  1104 05/23/20  1005 05/23/20  0907 05/23/20  0808 05/23/20  0701 05/23/20  0619 05/23/20  0612  05/23/20  0126  05/22/20  0745  05/21/20  1028  05/21/20  0444  05/20/20  2137   GLC   --   --   --   --   --  181*  --   --  173*  --  191*  --  344*  --  235*  --  266*   * 173* 208* 200* 179*  --  167*   < >  --    < >  --    < >  --    < >  --    < >  --     < > = values in this interval not displayed.

## 2020-05-23 NOTE — PROVIDER NOTIFICATION
Paged surgery cross cover at [phone]: Pt had a run of slow VT. HR 130s-150s. Currently in ST HR 110s. /82. Denies CP or SOB.   No new orders will continue to monitor.     0045: Pt had another run of slow VT. HR 150s-170s. Currently in ST HR 110s. Denies CP or SOB pt sleeping during run.   MD ordered BMP and Mg. Will continue to monitor.

## 2020-05-23 NOTE — PLAN OF CARE
[id number]:   Pt admitted 5/17 s/p OHT on 5/18  Hx: CAD s/p STEMI s/p stent (2018), CKD3, PAF, DM2, ICM s/p LVAD (2018)     Neuro: A/Ox4.  Cardiac: SR/ST with HR 100s-110s. Pt having runs of VT throughout the night. Pt has been asymptomatic, see provider notification note. AVSS.   Respiratory: O2 sats stable on RA  GI/: voiding spontaneously. BM x3 this shift.   Diet/appetite: regular diet, no nausea.   Activity:  SBA  Pain: denies  Skin: No new deficits noted. Old driveline site packed. Sternal incision approximated.  LDA's: CTx3 to -20 suction. PICC infusing TKO. PIV infusing insulin gtt between 7-13units/hr this shift, adjusted per algorithm 4+.   Labs: K3.8, Mg 1.8, Phos 1.8. All replaced and recheck with AM labs.     Plan: Continue with POC. Notify primary team with changes.

## 2020-05-23 NOTE — PROGRESS NOTES
IP Diabetes Management  Daily Note           Assessment and Plan:   HPI: Red is a 59 year old male with PMHx TIIDM, HTN, HLP, ischemic heart disease s/p STEMI with ALYSSA 2019, LVAD placement, CKD III and obesity who was admitted 5/17/20 for heart transplant. He underwent heart transplant on 5/18/20 with Dr. Willis. Donor blood cuultures and respiratory cultures returned positive for H flu, staph and strep.    Assessment:   1)Type II Diabetes Mellitus, un controlled (A1c 8.0%), with steroid induced hyperglycemia following heart transplant    Patient still remaining on high insulin requirement based on calculation of insulin drip rate over 12 hours. Currently still unsafe to transition off drip. Will increase NPH dose, this is to cover prednisone, reintroduce home lantus dose to cover his basal need separately.  Plan was discussed with patient and nurse.    Plan:   -Start NPH 70 units BID, restart home lantus 35 units today.   -continue IV insulin today, will reevaluate again tomorrow  -aspart 1:4g CHO coverage with meals and snacks/supplements  -BG monitoring q1-2 h per IV insulin protocol  -hypoglycemia protocol  -regular diet with carb counting protocol    Outpatient follow up: intervally with [facility] endocrine, then recommend establishing with endocrinologist close to home.       Interval History and Assessment: interval glucose trend reviewed:  When he lay down left side, heart rate inctrease  Got 106 units insulin from drip from 8pm to 8am overnight, NPH 50 insulin given yesterday at 8pm. Got 19 units aspart with dinner (1:4g of carb)    Does not like hourly fingerstick checks. Eating well.     Current nutritional intake and type: Orders Placed This Encounter      Regular Diet Adult    Steroid planning:   PO Prednisone 40mg BID 5/20-5/21  PO Prednisone 35 mg BID 5/22-5/23  PO Prednisone 30 mg BID 5/24-5/25  PO Prednisone 25 mg BID 5/26-5/27  PO Prednisone 20 mg BID 5/28-?    PTA Diabetes Regimen:   BG monitoring  four times daily   Lantus 35 units at bedtime  Novolog 1 unt per 7g CHO with meals/snacks  Novolog 1/25>140AC, >200 HS    Discharge Planning: TBD           Diabetes History:   Type of Diabetes: Type 2 Diabetes Mellitus, Steroid Induced Diabetes Mellitus, stress hyperglycemia  Lab Results   Component Value Date    A1C 8.0 05/12/2020    A1C 9.5 07/05/2019    A1C 9.4 01/11/2019    A1C 10.1 12/30/2018    A1C 10.6 12/20/2018              Review of Systems:   The Review of Systems is negative other than noted in the Interval History.           Medications:     Current Facility-Administered Medications   Medication     acetaminophen (TYLENOL) tablet 650 mg     aspirin EC tablet 81 mg     dextrose 10% infusion     glucose gel 15-30 g    Or     dextrose 50 % injection 25-50 mL    Or     glucagon injection 1 mg     diphenhydrAMINE (BENADRYL) capsule 25 mg    Or     diphenhydrAMINE (BENADRYL) injection 25 mg     heparin ANTICOAGULANT injection 5,000 Units     heparin lock flush 10 UNIT/ML injection 2-5 mL     HYDROmorphone (PF) (DILAUDID) injection 0.3-0.5 mg     insulin 1 unit/mL in saline (NovoLIN, HumuLIN Regular) drip - ADULT IV Infusion     insulin aspart (NovoLOG) injection (RAPID ACTING)     insulin aspart (NovoLOG) injection (RAPID ACTING)     insulin isophane human (HumuLIN N PEN) injection 50 Units     lidocaine (LMX4) cream     lidocaine (LMX4) cream     lidocaine 1 % 0.1-1 mL     lidocaine 1 % 0.1-1 mL     magnesium sulfate 2 g in water intermittent infusion     magnesium sulfate 4 g in 100 mL sterile water (premade)     melatonin tablet 1 mg     methocarbamol (ROBAXIN) tablet 750 mg     metoclopramide (REGLAN) tablet 10 mg    Or     metoclopramide (REGLAN) injection 10 mg     metroNIDAZOLE (FLAGYL) tablet 500 mg     mycophenolate (GENERIC EQUIVALENT) tablet 1,500 mg     naloxone (NARCAN) injection 0.1-0.4 mg     nystatin (MYCOSTATIN) suspension 1,000,000 Units     ondansetron (ZOFRAN-ODT) ODT tab 4 mg    Or      ondansetron (ZOFRAN) injection 4 mg     oxyCODONE (ROXICODONE) tablet 5-10 mg     pantoprazole (PROTONIX) EC tablet 40 mg     potassium chloride (KLOR-CON) Packet 20-40 mEq     potassium chloride 10 mEq in 100 mL intermittent infusion with 10 mg lidocaine     potassium chloride 10 mEq in 100 mL sterile water intermittent infusion (premix)     potassium chloride 20 mEq in 50 mL intermittent infusion     potassium chloride ER (KLOR-CON M) CR tablet 20-40 mEq     potassium phosphate 10 mmol in D5W 250 mL intermittent infusion     potassium phosphate 15 mmol in D5W 250 mL intermittent infusion     potassium phosphate 20 mmol in D5W 250 mL intermittent infusion     potassium phosphate 20 mmol in D5W 500 mL intermittent infusion     potassium phosphate 25 mmol in D5W 500 mL intermittent infusion     predniSONE (DELTASONE) tablet 35 mg    Followed by     [START ON 5/24/2020] predniSONE (DELTASONE) tablet 30 mg    Followed by     [START ON 5/26/2020] predniSONE (DELTASONE) tablet 25 mg    Followed by     [START ON 5/28/2020] predniSONE (DELTASONE) tablet 20 mg     prochlorperazine (COMPAZINE) injection 10 mg    Or     prochlorperazine (COMPAZINE) tablet 10 mg     Reason beta blocker order not selected     rosuvastatin (CRESTOR) tablet 10 mg     senna-docusate (SENOKOT-S/PERICOLACE) 8.6-50 MG per tablet 1 tablet    Or     senna-docusate (SENOKOT-S/PERICOLACE) 8.6-50 MG per tablet 2 tablet     sennosides (SENOKOT) tablet 8.6 mg     sodium chloride (PF) 0.9% PF flush 10 mL     sodium chloride (PF) 0.9% PF flush 10-20 mL     sodium chloride (PF) 0.9% PF flush 3 mL     sodium chloride (PF) 0.9% PF flush 3 mL     sodium chloride 0.9% infusion     sulfamethoxazole-trimethoprim (BACTRIM) 400-80 MG per tablet 1 tablet     terbutaline (BRETHINE) tablet 10 mg     valGANciclovir (VALCYTE) tablet 450 mg     vancomycin (VANCOCIN) 1,750 mg in sodium chloride 0.9 % 250 mL intermittent infusion            Physical Exam:    /85 (BP  "Location: Left arm)   Pulse 109   Temp 98.2  F (36.8  C) (Oral)   Resp 20   Ht 1.626 m (5' 4\")   Wt 89.5 kg (197 lb 6.4 oz)   SpO2 98%   BMI 33.88 kg/m                Data:     Recent Labs   Lab 05/23/20  0701 05/23/20  0619 05/23/20  0612 05/23/20  0503 05/23/20  0410 05/23/20  0303 05/23/20  0201 05/23/20  0126  05/22/20  0745  05/21/20  1028  05/21/20  0444  05/20/20  2137   GLC  --  181*  --   --   --   --   --  173*  --  191*  --  344*  --  235*  --  266*   *  --  167* 161* 180* 173* 182*  --    < >  --    < >  --    < >  --    < >  --     < > = values in this interval not displayed.         Ronda Kauffman   Endocrinology Fellow PGY-4  Discussed with staff endocrinologist Dr Dhillon.     --- Addendum----  I discussed about the patient with endocrine fellow Dr. Kauffman. Agree above note and plan.       Yokasta Dhillon MD  Staff Physician  Endocrinology and Metabolism  [facility]  License: [place] [id number]  Pager: [phone]        "

## 2020-05-23 NOTE — PLAN OF CARE
Discharge Planner PT   6C -   Patient plan for discharge: Home with assist, OP CR.  Current status: Pt completes transfers with SBA, appropriately maintains sternal precautions. Pt ambulates ~250ft and ~450ft with no AD and SBA. Pt ascends/descends 3 steps 4x with B HR initially, progressing to 1 HR with continued reps, SBA provided. Pt engaged in standing strengthening exercises. Pt needing occasional seated rest breaks throughout PT session. SpO2 >90% on RA, .  Barriers to return to prior living situation: Medical needs, sternal precautions.  Recommendations for discharge: Home with assist, OP CR.  Rationale for recommendations: Current level of function.

## 2020-05-23 NOTE — PROGRESS NOTES
[facility]   Cardiology II Service / Advanced Heart Failure  Daily Consult Note      Patient: Mariusz Sharp  MRN: [id number]  Admission Date: 5/17/2020  Hospital Day # 5    Assessment and Plan: Mariusz Sharp is a 59 year old male with history of CAD (s/p STEMI with ALYSSA to LAD 6/27/18), LV thrombus, CKD Stage III, PAF, DM Type II, and ICM with EF 20-25% s/p HeartMate 3 LVAD implant 12/31/18 now s/p orthotopic heart transplant 5/18/2020.    Recommendations:  -stat echo  -decrease terbutaline to 5 mg tid  -start tacro 0.5 mg bid  -next RHC/bx Tuesday, sooner if more VT    # Status post OHT on 5/18/20, history of ICM  # Chronic immunosuppression  * TTE 5/23/20: EF 65-70%, RV normal, no pericardial effusion   * Last [place] #s 5/21/20: CVP 12 PA 34/22, last PCWP 18 on 5/21    Graft Function:   --Volume: near euvolemic, defer diuretic today  --BP: 127-140/80s  --HR: 110s, decrease terbutaline 5 mg TID    Immunosuppression:   --CNI-delaying protocol with basiliximab on 5/18 and 5/22  --prednisone BID per taper, currently 35 mg bid  --Cellcept 1500 mg BID  --start tacrolimus today 0.5 mg bid, daily troughs (goal 10-12)     Serostatus: D+, R-, EBV: D- R+, Toxo D- R-    Prophylaxis:   --CAV: initiate aspirin per CVTS and rosuvastatin 10 mg daily  --Thrush: Nystatin   --PCP: Bactrim single strength daily   --GI: Protonix   --Osteoporosis: calcium/vitamin D   --CMV: Valcyte 900 mg daily through 11/2020 (D+/R -)     Surveillance:   --#1 due 5/25/20, sooner if recurrent VT  --DSAs ordered for 5/24  --#2 due 6/1/20    # NSVT  Developed asymptomatic NSVT overnight 5/22, HRs 130-150s, lasting ~60 beats. Remained hemodynamically stable. Per patient, seems positional when on left side.  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 Anginous)   -flagyl through 5/26 (for Veillonella)    # DM type II  -remains on insulin gtt per endocrine      Dinora LEX Harper, NP-C  Advanced Heart Failure/Cardiology II Service  Pager [phone] [facility] [phone]    ================================================================    Subjective/24-Hr Events:   Last 24 hr care team notes reviewed. Overnight had several runs of NSVT rates 130s-150s. Mostly asymptomatic, patient thinks most episodes happened while lying on his left side. Reports mild LE edema that is improving, denies orthopnea, PND. Reporting some loose stools but no nausea, abdominal pain.     ROS:  4 point ROS including respiratory, CV, GI and  (other than that noted in the HPI) is negative.     Medications: Reviewed in EPIC.     Physical Exam:   /85 (BP Location: Left arm)   Pulse 109   Temp 98.2  F (36.8  C) (Oral)   Resp 20   Ht 1.626 m (5' 4\")   Wt 89.5 kg (197 lb 6.4 oz)   SpO2 98%   BMI 33.88 kg/m      GENERAL: Appears comfortable, in no distress.  HEENT: Eye symmetrical, no discharge or icterus bilaterally. Mucous membranes moist and without lesions.  NECK: Supple, JVD just above clavicle at 45 degrees.   CV: Tachycardic and regular, +S1S2, no murmur, rub, or gallop.   RESPIRATORY: Respirations regular, even, and unlabored. Lungs CTA throughout.   GI: Soft, obese and non distended with normoactive bowel sounds present in all quadrants. No tenderness, rebound, guarding.   EXTREMITIES: trace pedal edema. 2+ bilateral pedal pulses. .   NEUROLOGIC: Alert and oriented x 3. No focal deficits.   MUSCULOSKELETAL: No joint swelling or tenderness.   SKIN: No jaundice. No rashes or lesions. Sternum stable without e/o infection.     Labs:  CMP  Recent Labs   Lab 05/23/20  0619 05/23/20  0126 05/22/20  2156 05/22/20  1316 05/22/20  0745 05/21/20  1028  05/19/20  0943 05/19/20  0324  05/18/20  1835  " 05/18/20  0857    140  --   --  139 138   < > 141 140   < > 141   < > 136   POTASSIUM 3.8 3.8  --  4.4 3.2* 4.0   < > 4.4 4.2   < > 3.7   < > 4.8   CHLORIDE 110* 110*  --   --  107 107   < > 112* 110*   < > 109  --  109   CO2 22 23  --   --  24 21   < > 21 24   < > 24  --  18*   ANIONGAP 8 7  --   --  8 9   < > 8 6   < > 8  --  10   * 173*  --   --  191* 344*   < > 128* 164*   < > 115*   < > 247*   BUN 31* 35*  --   --  41* 42*   < > 36* 38*   < > 39*  --  45*   CR 1.32* 1.42*  --   --  1.64* 1.80*   < > 2.40* 2.12*   < > 1.90*  --  1.95*   GFRESTIMATED 59* 54*  --   --  46* 41*   < > 29* 33*   < > 38*  --  37*   GFRESTBLACK 69 63  --   --  53* 47*   < > 33* 39*   < > 44*  --  43*   ARLENE 8.2* 7.9*  --   --  8.1* 7.4*   < > 8.4* 8.0*   < > 8.1*  --  8.7   MAG 2.5* 1.8  --   --  2.2  --   --   --  2.4*  --  2.8*  --  1.9   PHOS 2.9  --  1.8*  --  1.9*  --   --   --  2.5  --  2.2*  --  3.1   PROTTOTAL  --   --   --   --  5.7*  --   --  5.4*  --   --  5.4*  --  7.3   ALBUMIN  --   --   --   --  2.9*  --   --  3.0*  --   --  2.8*  --  3.4   BILITOTAL  --   --   --   --  0.5  --   --  1.0  --   --  1.5*  --  1.0   ALKPHOS  --   --   --   --  31*  --   --  27*  --   --  34*  --  54   AST  --   --   --   --  16  --   --  98*  --   --  112*  --  25   ALT  --   --   --   --  19  --   --  26  --   --  30  --  32    < > = values in this interval not displayed.       CBC  Recent Labs   Lab 05/23/20  0619 05/22/20  0745 05/21/20  1028 05/21/20  0444 05/20/20  2137   WBC 6.7  --  10.6 10.0 10.7   RBC 2.99*  --  2.98* 2.94* 2.95*   HGB 7.7* 8.0* 7.7* 7.6* 7.5*   HCT 26.1*  --  24.8* 24.4* 24.5*   MCV 87  --  83 83 83   MCH 25.8*  --  25.8* 25.9* 25.4*   MCHC 29.5*  --  31.0* 31.1* 30.6*   RDW 17.0*  --  16.7* 16.4* 16.4*     --  167 166 154       INR  Recent Labs   Lab 05/18/20  1835 05/18/20  1630 05/18/20  1248 05/18/20  0010   INR 1.54* 2.21* 1.42* 2.19*       Time/Communication  I personally spent a total of  25 minutes. Of that 15 minutes was counseling/coordination of patient's care. Plan of care discussed with patient. See my note above for details.    Patient discussed with Dr. [place].

## 2020-05-24 ENCOUNTER — SURGERY (OUTPATIENT)
Age: 60
End: 2020-05-24
Payer: COMMERCIAL

## 2020-05-24 ENCOUNTER — RESULTS ONLY (OUTPATIENT)
Dept: OTHER | Facility: CLINIC | Age: 60
End: 2020-05-24

## 2020-05-24 LAB
ANION GAP SERPL CALCULATED.3IONS-SCNC: 8 MMOL/L (ref 3–14)
BACTERIA SPEC CULT: ABNORMAL
BASE DEFICIT BLDV-SCNC: 0.8 MMOL/L
BUN SERPL-MCNC: 36 MG/DL (ref 7–30)
CA-I BLD-MCNC: 4.9 MG/DL (ref 4.4–5.2)
CALCIUM SERPL-MCNC: 8.3 MG/DL (ref 8.5–10.1)
CHLORIDE SERPL-SCNC: 110 MMOL/L (ref 94–109)
CO2 SERPL-SCNC: 20 MMOL/L (ref 20–32)
CREAT SERPL-MCNC: 1.36 MG/DL (ref 0.66–1.25)
ERYTHROCYTE [DISTWIDTH] IN BLOOD BY AUTOMATED COUNT: 17.4 % (ref 10–15)
GFR SERPL CREATININE-BSD FRML MDRD: 57 ML/MIN/{1.73_M2}
GLUCOSE BLD-MCNC: 86 MG/DL (ref 70–99)
GLUCOSE BLDC GLUCOMTR-MCNC: 110 MG/DL (ref 70–99)
GLUCOSE BLDC GLUCOMTR-MCNC: 124 MG/DL (ref 70–99)
GLUCOSE BLDC GLUCOMTR-MCNC: 132 MG/DL (ref 70–99)
GLUCOSE BLDC GLUCOMTR-MCNC: 225 MG/DL (ref 70–99)
GLUCOSE BLDC GLUCOMTR-MCNC: 60 MG/DL (ref 70–99)
GLUCOSE BLDC GLUCOMTR-MCNC: 65 MG/DL (ref 70–99)
GLUCOSE BLDC GLUCOMTR-MCNC: 72 MG/DL (ref 70–99)
GLUCOSE BLDC GLUCOMTR-MCNC: 74 MG/DL (ref 70–99)
GLUCOSE BLDC GLUCOMTR-MCNC: 76 MG/DL (ref 70–99)
GLUCOSE BLDC GLUCOMTR-MCNC: 79 MG/DL (ref 70–99)
GLUCOSE BLDC GLUCOMTR-MCNC: 81 MG/DL (ref 70–99)
GLUCOSE BLDC GLUCOMTR-MCNC: 83 MG/DL (ref 70–99)
GLUCOSE BLDC GLUCOMTR-MCNC: 91 MG/DL (ref 70–99)
GLUCOSE BLDC GLUCOMTR-MCNC: 93 MG/DL (ref 70–99)
GLUCOSE BLDC GLUCOMTR-MCNC: 97 MG/DL (ref 70–99)
GLUCOSE BLDC GLUCOMTR-MCNC: 99 MG/DL (ref 70–99)
GLUCOSE SERPL-MCNC: 91 MG/DL (ref 70–99)
HCO3 BLDV-SCNC: 24 MMOL/L (ref 21–28)
HCT VFR BLD AUTO: 25.8 % (ref 40–53)
HGB BLD-MCNC: 7.6 G/DL (ref 13.3–17.7)
HGB BLD-MCNC: 7.9 G/DL (ref 13.3–17.7)
LACTATE BLD-SCNC: 1 MMOL/L (ref 0.7–2)
MAGNESIUM SERPL-MCNC: 2.1 MG/DL (ref 1.6–2.3)
MCH RBC QN AUTO: 25.9 PG (ref 26.5–33)
MCHC RBC AUTO-ENTMCNC: 29.5 G/DL (ref 31.5–36.5)
MCV RBC AUTO: 88 FL (ref 78–100)
NT-PROBNP SERPL-MCNC: 5393 PG/ML (ref 0–900)
O2/TOTAL GAS SETTING VFR VENT: 24 %
OXYHGB MFR BLDV: 54 %
PCO2 BLDV: 36 MM HG (ref 40–50)
PH BLDV: 7.42 PH (ref 7.32–7.43)
PLATELET # BLD AUTO: 250 10E9/L (ref 150–450)
PO2 BLDV: 30 MM HG (ref 25–47)
POTASSIUM BLD-SCNC: 4.3 MMOL/L (ref 3.4–5.3)
POTASSIUM SERPL-SCNC: 4.6 MMOL/L (ref 3.4–5.3)
RBC # BLD AUTO: 2.93 10E12/L (ref 4.4–5.9)
SODIUM BLD-SCNC: 138 MMOL/L (ref 133–144)
SODIUM SERPL-SCNC: 138 MMOL/L (ref 133–144)
SPECIMEN SOURCE: ABNORMAL
TACROLIMUS BLD-MCNC: <3 UG/L (ref 5–15)
TME LAST DOSE: ABNORMAL H
TROPONIN I SERPL-MCNC: 1.29 UG/L (ref 0–0.04)
VANCOMYCIN SERPL-MCNC: 11.9 MG/L
WBC # BLD AUTO: 9.4 10E9/L (ref 4–11)

## 2020-05-24 PROCEDURE — 25000132 ZZH RX MED GY IP 250 OP 250 PS 637: Performed by: STUDENT IN AN ORGANIZED HEALTH CARE EDUCATION/TRAINING PROGRAM

## 2020-05-24 PROCEDURE — 93505 ENDOMYOCARDIAL BIOPSY: CPT | Performed by: INTERNAL MEDICINE

## 2020-05-24 PROCEDURE — 84295 ASSAY OF SERUM SODIUM: CPT

## 2020-05-24 PROCEDURE — 84484 ASSAY OF TROPONIN QUANT: CPT | Performed by: PHYSICIAN ASSISTANT

## 2020-05-24 PROCEDURE — 36415 COLL VENOUS BLD VENIPUNCTURE: CPT | Performed by: THORACIC SURGERY (CARDIOTHORACIC VASCULAR SURGERY)

## 2020-05-24 PROCEDURE — 80048 BASIC METABOLIC PNL TOTAL CA: CPT | Performed by: PHYSICIAN ASSISTANT

## 2020-05-24 PROCEDURE — 25000132 ZZH RX MED GY IP 250 OP 250 PS 637: Performed by: PHYSICIAN ASSISTANT

## 2020-05-24 PROCEDURE — 25000128 H RX IP 250 OP 636: Performed by: PHYSICIAN ASSISTANT

## 2020-05-24 PROCEDURE — 83880 ASSAY OF NATRIURETIC PEPTIDE: CPT | Performed by: NURSE PRACTITIONER

## 2020-05-24 PROCEDURE — 25800030 ZZH RX IP 258 OP 636: Performed by: THORACIC SURGERY (CARDIOTHORACIC VASCULAR SURGERY)

## 2020-05-24 PROCEDURE — 82330 ASSAY OF CALCIUM: CPT

## 2020-05-24 PROCEDURE — 25000128 H RX IP 250 OP 636: Performed by: THORACIC SURGERY (CARDIOTHORACIC VASCULAR SURGERY)

## 2020-05-24 PROCEDURE — 83735 ASSAY OF MAGNESIUM: CPT | Performed by: PHYSICIAN ASSISTANT

## 2020-05-24 PROCEDURE — 82803 BLOOD GASES ANY COMBINATION: CPT

## 2020-05-24 PROCEDURE — C1894 INTRO/SHEATH, NON-LASER: HCPCS | Performed by: INTERNAL MEDICINE

## 2020-05-24 PROCEDURE — 82947 ASSAY GLUCOSE BLOOD QUANT: CPT

## 2020-05-24 PROCEDURE — 25000131 ZZH RX MED GY IP 250 OP 636 PS 637: Performed by: NURSE PRACTITIONER

## 2020-05-24 PROCEDURE — 86832 HLA CLASS I HIGH DEFIN QUAL: CPT | Performed by: THORACIC SURGERY (CARDIOTHORACIC VASCULAR SURGERY)

## 2020-05-24 PROCEDURE — 25000132 ZZH RX MED GY IP 250 OP 250 PS 637: Performed by: THORACIC SURGERY (CARDIOTHORACIC VASCULAR SURGERY)

## 2020-05-24 PROCEDURE — 25000132 ZZH RX MED GY IP 250 OP 250 PS 637: Performed by: SURGERY

## 2020-05-24 PROCEDURE — 88346 IMFLUOR 1ST 1ANTB STAIN PX: CPT | Performed by: INTERNAL MEDICINE

## 2020-05-24 PROCEDURE — 25000128 H RX IP 250 OP 636: Performed by: NURSE PRACTITIONER

## 2020-05-24 PROCEDURE — 83605 ASSAY OF LACTIC ACID: CPT

## 2020-05-24 PROCEDURE — 21400000 ZZH R&B CCU UMMC

## 2020-05-24 PROCEDURE — 82810 BLOOD GASES O2 SAT ONLY: CPT

## 2020-05-24 PROCEDURE — 25000125 ZZHC RX 250: Performed by: INTERNAL MEDICINE

## 2020-05-24 PROCEDURE — 86833 HLA CLASS II HIGH DEFIN QUAL: CPT | Performed by: THORACIC SURGERY (CARDIOTHORACIC VASCULAR SURGERY)

## 2020-05-24 PROCEDURE — 80202 ASSAY OF VANCOMYCIN: CPT | Performed by: THORACIC SURGERY (CARDIOTHORACIC VASCULAR SURGERY)

## 2020-05-24 PROCEDURE — 99207 ZZC NO BILLABLE SERVICE THIS VISIT: CPT | Performed by: NURSE PRACTITIONER

## 2020-05-24 PROCEDURE — 83880 ASSAY OF NATRIURETIC PEPTIDE: CPT | Performed by: PHYSICIAN ASSISTANT

## 2020-05-24 PROCEDURE — 27210794 ZZH OR GENERAL SUPPLY STERILE: Performed by: INTERNAL MEDICINE

## 2020-05-24 PROCEDURE — 99233 SBSQ HOSP IP/OBS HIGH 50: CPT | Performed by: INTERNAL MEDICINE

## 2020-05-24 PROCEDURE — 36592 COLLECT BLOOD FROM PICC: CPT | Performed by: PHYSICIAN ASSISTANT

## 2020-05-24 PROCEDURE — 85027 COMPLETE CBC AUTOMATED: CPT | Performed by: PHYSICIAN ASSISTANT

## 2020-05-24 PROCEDURE — 88350 IMFLUOR EA ADDL 1ANTB STN PX: CPT | Performed by: INTERNAL MEDICINE

## 2020-05-24 PROCEDURE — 00000146 ZZHCL STATISTIC GLUCOSE BY METER IP

## 2020-05-24 PROCEDURE — 93505 ENDOMYOCARDIAL BIOPSY: CPT | Mod: 26 | Performed by: INTERNAL MEDICINE

## 2020-05-24 PROCEDURE — 88307 TISSUE EXAM BY PATHOLOGIST: CPT | Performed by: INTERNAL MEDICINE

## 2020-05-24 PROCEDURE — 80197 ASSAY OF TACROLIMUS: CPT | Performed by: NURSE PRACTITIONER

## 2020-05-24 PROCEDURE — 84132 ASSAY OF SERUM POTASSIUM: CPT

## 2020-05-24 PROCEDURE — 25000131 ZZH RX MED GY IP 250 OP 636 PS 637: Performed by: PHYSICIAN ASSISTANT

## 2020-05-24 RX ORDER — MAGNESIUM SULFATE HEPTAHYDRATE 40 MG/ML
2 INJECTION, SOLUTION INTRAVENOUS ONCE
Status: COMPLETED | OUTPATIENT
Start: 2020-05-24 | End: 2020-05-24

## 2020-05-24 RX ADMIN — PREDNISONE 30 MG: 20 TABLET ORAL at 18:06

## 2020-05-24 RX ADMIN — MYCOPHENOLATE MOFETIL 1500 MG: 500 TABLET ORAL at 09:38

## 2020-05-24 RX ADMIN — TACROLIMUS 0.5 MG: 0.5 CAPSULE ORAL at 09:38

## 2020-05-24 RX ADMIN — HEPARIN SODIUM 5000 UNITS: 5000 INJECTION, SOLUTION INTRAVENOUS; SUBCUTANEOUS at 13:55

## 2020-05-24 RX ADMIN — METRONIDAZOLE 500 MG: 500 TABLET ORAL at 09:39

## 2020-05-24 RX ADMIN — ROSUVASTATIN CALCIUM 10 MG: 10 TABLET, FILM COATED ORAL at 09:39

## 2020-05-24 RX ADMIN — NYSTATIN 1000000 UNITS: 100000 SUSPENSION ORAL at 15:57

## 2020-05-24 RX ADMIN — PREDNISONE 30 MG: 20 TABLET ORAL at 09:39

## 2020-05-24 RX ADMIN — METRONIDAZOLE 500 MG: 500 TABLET ORAL at 19:55

## 2020-05-24 RX ADMIN — TACROLIMUS 0.5 MG: 0.5 CAPSULE ORAL at 18:07

## 2020-05-24 RX ADMIN — ASPIRIN 81 MG: 81 TABLET, COATED ORAL at 09:38

## 2020-05-24 RX ADMIN — HEPARIN SODIUM 5000 UNITS: 5000 INJECTION, SOLUTION INTRAVENOUS; SUBCUTANEOUS at 21:53

## 2020-05-24 RX ADMIN — MYCOPHENOLATE MOFETIL 1500 MG: 500 TABLET ORAL at 19:55

## 2020-05-24 RX ADMIN — LIDOCAINE HYDROCHLORIDE 7 ML: 10 INJECTION, SOLUTION EPIDURAL; INFILTRATION; INTRACAUDAL; PERINEURAL at 11:22

## 2020-05-24 RX ADMIN — VALGANCICLOVIR 900 MG: 450 TABLET, FILM COATED ORAL at 09:39

## 2020-05-24 RX ADMIN — HEPARIN SODIUM 5000 UNITS: 5000 INJECTION, SOLUTION INTRAVENOUS; SUBCUTANEOUS at 06:09

## 2020-05-24 RX ADMIN — NYSTATIN 1000000 UNITS: 100000 SUSPENSION ORAL at 12:45

## 2020-05-24 RX ADMIN — VANCOMYCIN HYDROCHLORIDE 1750 MG: 10 INJECTION, POWDER, LYOPHILIZED, FOR SOLUTION INTRAVENOUS at 18:12

## 2020-05-24 RX ADMIN — PANTOPRAZOLE SODIUM 40 MG: 40 TABLET, DELAYED RELEASE ORAL at 09:39

## 2020-05-24 RX ADMIN — NYSTATIN 1000000 UNITS: 100000 SUSPENSION ORAL at 09:40

## 2020-05-24 RX ADMIN — NYSTATIN 1000000 UNITS: 100000 SUSPENSION ORAL at 19:55

## 2020-05-24 RX ADMIN — INSULIN ASPART 10 UNITS: 100 INJECTION, SOLUTION INTRAVENOUS; SUBCUTANEOUS at 12:44

## 2020-05-24 RX ADMIN — METRONIDAZOLE 500 MG: 500 TABLET ORAL at 13:55

## 2020-05-24 RX ADMIN — SULFAMETHOXAZOLE AND TRIMETHOPRIM 1 TABLET: 400; 80 TABLET ORAL at 09:39

## 2020-05-24 RX ADMIN — MAGNESIUM SULFATE IN WATER 2 G: 40 INJECTION, SOLUTION INTRAVENOUS at 13:55

## 2020-05-24 ASSESSMENT — ACTIVITIES OF DAILY LIVING (ADL)
ADLS_ACUITY_SCORE: 15

## 2020-05-24 ASSESSMENT — MIFFLIN-ST. JEOR: SCORE: 1646.82

## 2020-05-24 NOTE — PLAN OF CARE
Neuro: A&Ox4.  Cardiac: SR/ST with HR 110s. No episodes of VT runs noted during this shift. OVSS.    Respiratory: O2 98% on RA  GI/: voiding spontaneously via urinal    Diet/appetite: regular diet, ate 100% of meals x 3  Activity:  SBA to assist w lines  Pain: denies  Skin: No new deficits noted. Old driveline site packed by team. Sternal incision approximated.  LDA's: pericardial CT to -20 suction. Medistinal CT x 2 removed this shift. PICC infusing TKO. PIV infusing insulin gtt adjusted per MAR on algorithm 4+.      Plan: Continue with POC. Notify primary team with changes.

## 2020-05-24 NOTE — PROGRESS NOTES
IP Diabetes Management  Daily Note           Assessment and Plan:   HPI: Red is a 59 year old male with PMHx TIIDM, HTN, HLP, ischemic heart disease s/p STEMI with ALYSSA 2019, LVAD placement, CKD III and obesity who was admitted 5/17/20 for heart transplant. He underwent heart transplant on 5/18/20 with Dr. Willis. Donor blood cuultures and respiratory cultures returned positive for H flu, staph and strep.    Assessment:   1)Type II Diabetes Mellitus, un controlled (A1c 8.0%), with steroid induced hyperglycemia following heart transplant    After starting lantus 35 units yesterday and NPH 70 yesterday he became hypoglycemic.  Apparently he requires less insulin than what was calculated over drip.      Plan:   -Continue lantus 35 units daily at am  -Reduce NPH to 40 units BID to be given with prednisone(except today morning give 20 units NPH instead of 40, because of NPO status and would like to monitor BG trend)  -Ok to stop IV insulin drip.   -Continue aspart 1:4g CHO coverage with meals and snacks/supplements  -hypoglycemia protocol  -regular diet with carb counting protocol    [facility] then recommend establishing with endocrinologist close to home.     Interval History and Assessment: interval glucose trend reviewed:  -After starting 35 lantus yesterday morning, on 1:4 carb coverage,  for next 8 hours he requires an average of 5.75 units per hour of insulin through the drip.  (5.75x24 hours=138 units of additional requirement per day. )  -After that at 6pm he got NPH 70 units with prednisone which caused hypoglycemia. Insulin drip was stopped since 10pm to today am. BG 60 at 4 am today.   -Patient will be NPO today for cardiac biopsy procedure, steroid still planned to be given, now taper dose is 35mg BID.     Current nutritional intake and type: Orders Placed This Encounter      NPO per Anesthesia Guidelines for Procedure/Surgery Except for: Meds    Steroid  planning:   PO Prednisone 40mg BID 5/20-5/21  PO Prednisone 35 mg BID 5/22-5/23  PO Prednisone 30 mg BID 5/24-5/25  PO Prednisone 25 mg BID 5/26-5/27  PO Prednisone 20 mg BID 5/28-?    PTA Diabetes Regimen:   BG monitoring four times daily   Lantus 35 units at bedtime  Novolog 1 unt per 7g CHO with meals/snacks  Novolog 1/25>140AC, >200 HS    Discharge Planning: TBD           Diabetes History:   Type of Diabetes: Type 2 Diabetes Mellitus, Steroid Induced Diabetes Mellitus, stress hyperglycemia  Lab Results   Component Value Date    A1C 8.0 05/12/2020    A1C 9.5 07/05/2019    A1C 9.4 01/11/2019    A1C 10.1 12/30/2018    A1C 10.6 12/20/2018              Review of Systems:   The Review of Systems is negative other than noted in the Interval History.           Medications:     Current Facility-Administered Medications   Medication     acetaminophen (TYLENOL) tablet 650 mg     aspirin EC tablet 81 mg     dextrose 10% infusion     glucose gel 15-30 g    Or     dextrose 50 % injection 25-50 mL    Or     glucagon injection 1 mg     diphenhydrAMINE (BENADRYL) capsule 25 mg    Or     diphenhydrAMINE (BENADRYL) injection 25 mg     heparin ANTICOAGULANT injection 5,000 Units     heparin lock flush 10 UNIT/ML injection 2-5 mL     HYDROmorphone (PF) (DILAUDID) injection 0.3-0.5 mg     insulin 1 unit/mL in saline (NovoLIN, HumuLIN Regular) drip - ADULT IV Infusion     insulin aspart (NovoLOG) injection (RAPID ACTING)     insulin aspart (NovoLOG) injection (RAPID ACTING)     insulin glargine (LANTUS PEN) injection 35 Units     insulin isophane human (HumuLIN N PEN) injection 70 Units     lidocaine (LMX4) cream     lidocaine (LMX4) cream     lidocaine 1 % 0.1-1 mL     lidocaine 1 % 0.1-1 mL     magnesium sulfate 2 g in water intermittent infusion     magnesium sulfate 4 g in 100 mL sterile water (premade)     melatonin tablet 1 mg     methocarbamol (ROBAXIN) tablet 750 mg     metoclopramide (REGLAN) tablet 10 mg    Or      metoclopramide (REGLAN) injection 10 mg     metroNIDAZOLE (FLAGYL) tablet 500 mg     mycophenolate (GENERIC EQUIVALENT) tablet 1,500 mg     naloxone (NARCAN) injection 0.1-0.4 mg     nystatin (MYCOSTATIN) suspension 1,000,000 Units     ondansetron (ZOFRAN-ODT) ODT tab 4 mg    Or     ondansetron (ZOFRAN) injection 4 mg     oxyCODONE (ROXICODONE) tablet 5-10 mg     pantoprazole (PROTONIX) EC tablet 40 mg     potassium chloride (KLOR-CON) Packet 20-40 mEq     potassium chloride 10 mEq in 100 mL intermittent infusion with 10 mg lidocaine     potassium chloride 10 mEq in 100 mL sterile water intermittent infusion (premix)     potassium chloride 20 mEq in 50 mL intermittent infusion     potassium chloride ER (KLOR-CON M) CR tablet 20-40 mEq     potassium phosphate 10 mmol in D5W 250 mL intermittent infusion     potassium phosphate 15 mmol in D5W 250 mL intermittent infusion     potassium phosphate 20 mmol in D5W 250 mL intermittent infusion     potassium phosphate 20 mmol in D5W 500 mL intermittent infusion     potassium phosphate 25 mmol in D5W 500 mL intermittent infusion     predniSONE (DELTASONE) tablet 30 mg    Followed by     [START ON 5/26/2020] predniSONE (DELTASONE) tablet 25 mg    Followed by     [START ON 5/28/2020] predniSONE (DELTASONE) tablet 20 mg     prochlorperazine (COMPAZINE) injection 10 mg    Or     prochlorperazine (COMPAZINE) tablet 10 mg     Reason beta blocker order not selected     rosuvastatin (CRESTOR) tablet 10 mg     senna-docusate (SENOKOT-S/PERICOLACE) 8.6-50 MG per tablet 1 tablet    Or     senna-docusate (SENOKOT-S/PERICOLACE) 8.6-50 MG per tablet 2 tablet     sennosides (SENOKOT) tablet 8.6 mg     sodium chloride (PF) 0.9% PF flush 10 mL     sodium chloride (PF) 0.9% PF flush 10 mL     sodium chloride (PF) 0.9% PF flush 10-20 mL     sodium chloride (PF) 0.9% PF flush 3 mL     sodium chloride (PF) 0.9% PF flush 3 mL     sodium chloride 0.9% infusion     sulfamethoxazole-trimethoprim  "(BACTRIM) 400-80 MG per tablet 1 tablet     tacrolimus (GENERIC EQUIVALENT) capsule 0.5 mg     valGANciclovir (VALCYTE) tablet 900 mg     vancomycin (VANCOCIN) 1,750 mg in sodium chloride 0.9 % 250 mL intermittent infusion            Physical Exam:    BP (!) 145/91 (BP Location: Left arm)   Pulse 109   Temp 98.6  F (37  C) (Oral)   Resp 18   Ht 1.626 m (5' 4\")   Wt 91.1 kg (200 lb 12.8 oz)   SpO2 97%   BMI 34.47 kg/m    General: no acute distress  Mental status: alert  Resp: no acute distress  Phone visit due to COVID19          Data:     Recent Labs   Lab 05/24/20  0808 05/24/20  0656 05/24/20  0647 05/24/20  0552 05/24/20  0448 05/24/20  0415 05/24/20  0402  05/23/20  0619  05/23/20  0126  05/22/20  0745  05/21/20  1028  05/21/20  0444   GLC  --   --  91  --   --   --   --   --  181*  --  173*  --  191*  --  344*  --  235*   * 76  --  99 97 65* 60*   < >  --    < >  --    < >  --    < >  --    < >  --     < > = values in this interval not displayed.       Ronda Kauffman   Endocrinology Fellow PGY-4  Discussed with staff endocrinologist Dr Dhillon.     --- Addendum----  I discussed about the patient with endocrine fellow Dr. Kauffman. Agree above note and plan.         Yokasta Dhillon MD  Staff Physician  Endocrinology and Metabolism  [facility]  License: [place] [address]  Pager: [phone]          "

## 2020-05-24 NOTE — PROGRESS NOTES
"  [facility]   Cardiology II Service / Advanced Heart Failure  Daily Progress Note  Date of Service: 5/24/2020      Patient: Mariusz Sharp  MRN: [id number]  Admission Date: 5/17/2020  Hospital Day # 6    Assessment and Plan: Mariusz Sharp is a 59 year old male with PMH of CAD, LV thrombus, CKD Stage III, PAF, DM Type II, and ICM s/p HM III LVAD as BTT with subsequent OHT 5/18/20.    S/p OHT secondary to ICM. 5/18/20. Echo 5/23/20 with EF 65-70% and normal graft function.   RHC today with mRA-7, RV-30/6, mPA-24, mPCWP-15, AMRIT CO-5.12, AMRIT CI-2.62, biopsy pending.   Immunosuppression: Simulect 5/18 and 5/22. Tac 0.5 mg po BID, tac level-<3, goal-10-12. Cellcept 1500 mg po BID, and Prednisone taper.   Serostatus: CMV: D+, R-, EBV: D- R+, Toxo D- R-  Prophylaxis; Nystatin, Valcyte, and Bactrim  - Continue ASA and Crestor.   - RHC/biopsy today given persistent Vtach with path rushed. Tr-1.2, BNP pending. Echo with normal graft function.     NSVT. Recurrent bursts NSVT with no hemodynamic compromise.   - Electrolytes optimized.   - RHC/biopsy today given persistent Vtach with path rushed.    Gram positive bacilli with diphtheroids to LVAD outflow graft.   - Appreciate ID input.   - Continue Vanco through 6/1.    Donor positive S Anginosus and Veillonella. Questionable contaminant. Zosyn completed 5/22. Note Staph Anginosus sensitive to Ceftriaxone, Clindamycin, Erythromycin, PCN, and Vanco.   - Appreciate ID input.   - Continue Vanco through 6/1 and Flagyl through 5/26.    DM Type II.   - management per Endocrinology.   ================================================================    Interval History/ROS: He complaints of occasional chest \"thumping\". He denies fever, chills, lightheadedness, dizziness, chest pain, RADER, cough, SOB, nausea, vomiting, diarrhea, and LE edema. He is tolerating oral intake and ambulation.     Last 24 hr care team notes reviewed.   ROS:  4 point ROS including Respiratory, CV, " "GI and , other than that noted in the HPI, is negative.     Medications: Reviewed in EPIC.     Physical Exam:   /83 (BP Location: Left arm)   Pulse 109   Temp 98.3  F (36.8  C) (Oral)   Resp 18   Ht 1.626 m (5' 4\")   Wt 91.1 kg (200 lb 12.8 oz)   SpO2 98%   BMI 34.47 kg/m    GENERAL: Appears alert and oriented times three.   HEENT: Eye symmetrical and free of discharge bilaterally. Mucous membranes moist and without lesions.  NECK: Supple and without lymphadenopathy. JVD below clavicular line upright.   CV: Tachycardic, regular. S1S2 present without murmur, rub, or gallop.   RESPIRATORY: Respirations regular, even, and unlabored. Lungs CTA throughout.   GI: Soft and non distended with normoactive bowel sounds present in all quadrants. No tenderness, rebound, guarding. No organomegaly.   EXTREMITIES: +1 bilateral LE peripheral edema. 2+ bilateral pedal pulses.   NEUROLOGIC: Alert and orientated x 3. CN II-XII grossly intact. No focal deficits.   MUSCULOSKELETAL: No joint swelling or tenderness.   SKIN: No jaundice. No rashes or lesions.     Data:  CMP  Recent Labs   Lab 05/24/20  1137 05/24/20  0647 05/23/20  0619 05/23/20  0126 05/22/20  2156  05/22/20  0745  05/19/20  0943 05/19/20  0324  05/18/20  1835  05/18/20  0857    138 140 140  --   --  139   < > 141 140   < > 141   < > 136   POTASSIUM 4.3 4.6 3.8 3.8  --    < > 3.2*   < > 4.4 4.2   < > 3.7   < > 4.8   CHLORIDE  --  110* 110* 110*  --   --  107   < > 112* 110*   < > 109  --  109   CO2  --  20 22 23  --   --  24   < > 21 24   < > 24  --  18*   ANIONGAP  --  8 8 7  --   --  8   < > 8 6   < > 8  --  10   GLC 86 91 181* 173*  --   --  191*   < > 128* 164*   < > 115*   < > 247*   BUN  --  36* 31* 35*  --   --  41*   < > 36* 38*   < > 39*  --  45*   CR  --  1.36* 1.32* 1.42*  --   --  1.64*   < > 2.40* 2.12*   < > 1.90*  --  1.95*   GFRESTIMATED  --  57* 59* 54*  --   --  46*   < > 29* 33*   < > 38*  --  37*   GFRESTBLACK  --  66 69 63  --   " --  53*   < > 33* 39*   < > 44*  --  43*   ARLENE  --  8.3* 8.2* 7.9*  --   --  8.1*   < > 8.4* 8.0*   < > 8.1*  --  8.7   MAG  --   --  2.5* 1.8  --   --  2.2  --   --  2.4*  --  2.8*  --  1.9   PHOS  --   --  2.9  --  1.8*  --  1.9*  --   --  2.5  --  2.2*  --  3.1   PROTTOTAL  --   --   --   --   --   --  5.7*  --  5.4*  --   --  5.4*  --  7.3   ALBUMIN  --   --   --   --   --   --  2.9*  --  3.0*  --   --  2.8*  --  3.4   BILITOTAL  --   --   --   --   --   --  0.5  --  1.0  --   --  1.5*  --  1.0   ALKPHOS  --   --   --   --   --   --  31*  --  27*  --   --  34*  --  54   AST  --   --   --   --   --   --  16  --  98*  --   --  112*  --  25   ALT  --   --   --   --   --   --  19  --  26  --   --  30  --  32    < > = values in this interval not displayed.     CBC  Recent Labs   Lab 05/24/20  1137 05/24/20  0647 05/23/20  0619 05/22/20  0745 05/21/20  1028 05/21/20  0444   WBC  --  9.4 6.7  --  10.6 10.0   RBC  --  2.93* 2.99*  --  2.98* 2.94*   HGB 7.9* 7.6* 7.7* 8.0* 7.7* 7.6*   HCT  --  25.8* 26.1*  --  24.8* 24.4*   MCV  --  88 87  --  83 83   MCH  --  25.9* 25.8*  --  25.8* 25.9*   MCHC  --  29.5* 29.5*  --  31.0* 31.1*   RDW  --  17.4* 17.0*  --  16.7* 16.4*   PLT  --  250 216  --  167 166     INR  Recent Labs   Lab 05/18/20  1835 05/18/20  1630 05/18/20  1248 05/18/20  0010   INR 1.54* 2.21* 1.42* 2.19*       Patient seen and discussed with Dr. [place].      Antonia Byrne [facility]  5/24/2020

## 2020-05-24 NOTE — PLAN OF CARE
OT: Pt in cath lab when OT attempted. OT will check back as available/appropriate or reschedule for 5/25.

## 2020-05-24 NOTE — PROGRESS NOTES
Cardiovascular Surgery Progress Note  5/24/2020         Assessment and Plan:     Mariusz Sharp is a 59 year old male history of CAD (s/p STEMI with ALYSSA to LAD 6/27/18), LV thrombus, CKD Stage III, PAF, DM Type II (on insulin), and ICM with EF 20-25% s/p HeartMate 3 LVAD implant 12/31/18, he presented from home (COVID negative) on 5/17/20 with an offer for a donor organ and had an orthotopic heart transplant with removal of ICD with Dr Willis and Dr Baker on 5/18.  He did well in the ICU and was quickly weaned off pressors and extubated POD #1 to 2 lpm via NC with neuro status intact. Endocrine was consulted for transition off of the insulin gtt. ID was consulted after the donor's sputum and blood had growth. He responded well to gentle diuresis before transferring out of the ICU.      Cardiovascular:   Hx VT, systolic HF, AF, and ICM. S/P LVAD Dec, 2018.     S/p orthotopic heart transplant   - No arrhythmia until 5/23 with nonsustained VT, HD stable.  - Decreased Terbutaline from 10 mg to 5 mg TID  - Okay to start ASA 81 mg and rosuvastatin   - Cards dosing immunosuppression, Simulect given 5/22.  Post-op Ventricular Tachycardia  - Multiple episodes on 5/23 and 5/24 am.   - Getting RHC and Bx on 5/24 for VT.    Chest tubes: pericardial drain  TPW: capped 5/22      Pulmonary:  - Extubated POD 1 to 2 lpm via NC; Saturating well on RA.   - Supplemental O2 PRN to keep sats > 92%. Wean off as tolerated.  - Pulm toilet, IS, activity and deep breathing     Neurology / MSK:  - Neuro intact  - Acute post-operative pain; pain well controlled with acetaminophen, PO oxycodone PRN, IV dilaudid PRN      / Renal:  - Hx stage III renal disease. Most recent creatinine 1.36 and stable, adequate UOP.  - Diuresis per Cardiology.      GI / FEN:   - Regular diet, continue bowel regimen  - Replace electrolytes as needed, hepatic enzymes WNL.     Endocrine:  T2 DM on pre-op insulin   - Appreciate Endocrine following, currently  on insulin gtt and prandial dosing     Infectious Disease:  At risk for donor-derived infection  - ID was consulted 5/21 when the donor blood grew S anginosus and Veillonella sp.  The S anginosus is usually susceptible to cephalosporins and Veillonella to flagyl. After the routine zosyn (ends 5/22), recommended continuing Vancomycin until 6/1 and start flagyl 500 mg tid PO until 5/25.   - The S aureus and the H influenza in the sputum of the donor are of no clinical value in this recipient of the donor's heart.    - WBC WNL, remains afebrile, no signs or symptoms of infection.   - Packing Driveline site BID with moistened Nugauze strips      Hematology:   - Acute blood loss anemia and thrombocytopenia  - Hgb 7.6; Plt WNL, no signs or symptoms of active bleeding  - Considering 1 unit PRBC, cardiology to discuss with staff     Anticoagulation:   - ASA only      Prophylaxis:   - Stress ulcer prophylaxis: Pantoprazole 40 mg daily for 30 days  - DVT prophylaxis: Subcutaneous heparin, SCD     Disposition:   - Transferred to  on 5/21  - Rehab recommending discharge to Home with outpt rehab       Discussed with CVTS Fellow as needed.  Staff surgeons have been informed of changes through both verbal and written communication.      Guillermo Ortiz PA-C  Cardiothoracic Surgery  Pager [phone]    8:10 AM   May 24, 2020        Interval History:     Overnight events - VT again last night, planning Hrt Bx today.   States pain is well managed on current regimen. Slept poorly overnight due to blood sugar checks.   Tolerating diet, is passing flatus, + BM. No nausea or vomiting  Breathing well without complaints.   Working with therapies and ambulating in halls with assistance.   Denies chest pain, palpitations, dizziness, syncopal symptoms, fevers, chills, myalgias, or sternal popping/clicking.         Physical Exam:   Blood pressure (!) 145/91, pulse 109, temperature 98.6  F (37  C), temperature source Oral, resp. rate 18,  "height 1.626 m (5' 4\"), weight 91.1 kg (200 lb 12.8 oz), SpO2 97 %.  Vitals:    05/22/20 0655 05/23/20 0234 05/24/20 0310   Weight: 88.9 kg (196 lb) 89.5 kg (197 lb 6.4 oz) 91.1 kg (200 lb 12.8 oz)      Weight; + 4 kg since admit and trending up.   24 hr Fluid status; net loss about 1 L.   MAPs: 93 - 109     Gen: A&Ox4, NAD but looks tired   Neuro: no focal deficits   CV: sinus tachy, normal S1 S2, no murmurs, rubs or gallops.   Pulm: CTA, no wheezing or rhonchi, normal breathing on RA  Abd: nondistended, normal BS, soft, nontender  Ext: trace periperal edema  Incision: clean, dry, intact, no erythema, sternum stable  Tubes/drain sites: dressing clean and dry, serosanguinous output, no air leak. 24 hr output 90 mL.   Old Driveline site: dressing clean and dry          Data:    Imaging:  reviewed recent imaging, no acute concerns    Labs:  BMP  Recent Labs   Lab 05/24/20  0647 05/23/20  0619 05/23/20  0126 05/22/20  1316 05/22/20  0745    140 140  --  139   POTASSIUM 4.6 3.8 3.8 4.4 3.2*   CHLORIDE 110* 110* 110*  --  107   ARLENE 8.3* 8.2* 7.9*  --  8.1*   CO2 20 22 23  --  24   BUN 36* 31* 35*  --  41*   CR 1.36* 1.32* 1.42*  --  1.64*   GLC 91 181* 173*  --  191*     CBC  Recent Labs   Lab 05/24/20  0647 05/23/20  0619 05/22/20  0745 05/21/20  1028 05/21/20  0444   WBC 9.4 6.7  --  10.6 10.0   RBC 2.93* 2.99*  --  2.98* 2.94*   HGB 7.6* 7.7* 8.0* 7.7* 7.6*   HCT 25.8* 26.1*  --  24.8* 24.4*   MCV 88 87  --  83 83   MCH 25.9* 25.8*  --  25.8* 25.9*   MCHC 29.5* 29.5*  --  31.0* 31.1*   RDW 17.4* 17.0*  --  16.7* 16.4*    216  --  167 166     INR  Recent Labs   Lab 05/18/20  1835 05/18/20  1630 05/18/20  1248 05/18/20  0010   INR 1.54* 2.21* 1.42* 2.19*      Liver Function Studies -   Recent Labs   Lab Test 05/22/20  0745   PROTTOTAL 5.7*   ALBUMIN 2.9*   BILITOTAL 0.5   ALKPHOS 31*   AST 16   ALT 19     GLUCOSE:   Recent Labs   Lab 05/24/20  0656 05/24/20  0647 05/24/20  0552 05/24/20  0448 05/24/20  0415 " 05/24/20  0402 05/24/20  0259  05/23/20  0619  05/23/20  0126  05/22/20  0745  05/21/20  1028  05/21/20  0444   GLC  --  91  --   --   --   --   --   --  181*  --  173*  --  191*  --  344*  --  235*   BGM 76  --  99 97 65* 60* 72   < >  --    < >  --    < >  --    < >  --    < >  --     < > = values in this interval not displayed.

## 2020-05-24 NOTE — PLAN OF CARE
Neuro: A&Ox4.  Cardiac: SR/ST with -110s. One episode of 11 beat VT run noted during this shift while pt laying on L side, team notified. Bx completed in cath lab d/t recent nonsustained VT runs    Respiratory: O2 98% on RA  GI/: voiding spontaneously via urinal , med soft BM x 1.   Diet/appetite: regular diet, ate 100% of meals x2   Activity:  SBA to assist w lines  Pain: denies  Skin: No new deficits noted. Old driveline site, Sternal incision approximated.  LDA's: pericardial CT to -20 suction. R DL PICC, R PIV     Plan: Bx completed this shift. Insulin gtt stopped previous shift d/t glucoses in 60s-90s, insulin gtt order dc'd this shift. Sliding scale insulin added. Endocrine consulting. AM insulins adjusted per endocrine d/t glucoses 80s-90s, NPO status for procedure. Mag completed per MAR.

## 2020-05-24 NOTE — PLAN OF CARE
Neuro: A&Ox4.  Cardiac: SR/ST with -110s. One short VT run while pt on left side. OVSS.    Respiratory: O2 98% on RA  GI/: voiding spontaneously via urinal. BM 5/24  Diet/appetite: regular diet, good intake  Activity:  SBA to assist w lines  Pain: denies  Skin: No new deficits noted. Old driveline site packed by team. Sternal incision intact.  LDA's: pericardial CT to -20 suction. PICC infusing TKO. PIV infusing insulin gtt adjusted per MAR on algorithm 4+, held since 2200.   Lab: BG 60, 3 apple juices given to increase. Continues to trend down at 0700.     Plan: Continue with POC. Notify primary team with changes.

## 2020-05-24 NOTE — PROGRESS NOTES
Donor Culture Results:    Preliminary positive donor blood culture results have been uploaded into [facility]. Donor ID [id number].  Dr. Colorado notified of results.

## 2020-05-25 ENCOUNTER — APPOINTMENT (OUTPATIENT)
Dept: GENERAL RADIOLOGY | Facility: CLINIC | Age: 60
DRG: 001 | End: 2020-05-25
Attending: THORACIC SURGERY (CARDIOTHORACIC VASCULAR SURGERY)
Payer: COMMERCIAL

## 2020-05-25 LAB
ALBUMIN SERPL-MCNC: 2.6 G/DL (ref 3.4–5)
ALP SERPL-CCNC: 33 U/L (ref 40–150)
ALT SERPL W P-5'-P-CCNC: 47 U/L (ref 0–70)
ANION GAP SERPL CALCULATED.3IONS-SCNC: 6 MMOL/L (ref 3–14)
AST SERPL W P-5'-P-CCNC: 23 U/L (ref 0–45)
BACTERIA SPEC CULT: ABNORMAL
BACTERIA SPEC CULT: ABNORMAL
BILIRUB DIRECT SERPL-MCNC: 0.2 MG/DL (ref 0–0.2)
BILIRUB SERPL-MCNC: 0.7 MG/DL (ref 0.2–1.3)
BUN SERPL-MCNC: 35 MG/DL (ref 7–30)
CA-I BLD-MCNC: 4.6 MG/DL (ref 4.4–5.2)
CALCIUM SERPL-MCNC: 8.2 MG/DL (ref 8.5–10.1)
CHLORIDE SERPL-SCNC: 108 MMOL/L (ref 94–109)
CO2 SERPL-SCNC: 20 MMOL/L (ref 20–32)
CREAT SERPL-MCNC: 1.31 MG/DL (ref 0.66–1.25)
ERYTHROCYTE [DISTWIDTH] IN BLOOD BY AUTOMATED COUNT: 17.2 % (ref 10–15)
GFR SERPL CREATININE-BSD FRML MDRD: 60 ML/MIN/{1.73_M2}
GLUCOSE BLDC GLUCOMTR-MCNC: 107 MG/DL (ref 70–99)
GLUCOSE BLDC GLUCOMTR-MCNC: 119 MG/DL (ref 70–99)
GLUCOSE BLDC GLUCOMTR-MCNC: 161 MG/DL (ref 70–99)
GLUCOSE BLDC GLUCOMTR-MCNC: 173 MG/DL (ref 70–99)
GLUCOSE BLDC GLUCOMTR-MCNC: 81 MG/DL (ref 70–99)
GLUCOSE SERPL-MCNC: 127 MG/DL (ref 70–99)
HCT VFR BLD AUTO: 26.7 % (ref 40–53)
HGB BLD-MCNC: 8.1 G/DL (ref 13.3–17.7)
INTERPRETATION ECG - MUSE: NORMAL
Lab: ABNORMAL
MAGNESIUM SERPL-MCNC: 2.1 MG/DL (ref 1.6–2.3)
MCH RBC QN AUTO: 26 PG (ref 26.5–33)
MCHC RBC AUTO-ENTMCNC: 30.3 G/DL (ref 31.5–36.5)
MCV RBC AUTO: 86 FL (ref 78–100)
PHOSPHATE SERPL-MCNC: 3.5 MG/DL (ref 2.5–4.5)
PLATELET # BLD AUTO: 267 10E9/L (ref 150–450)
POTASSIUM SERPL-SCNC: 5.2 MMOL/L (ref 3.4–5.3)
PROT SERPL-MCNC: 5.3 G/DL (ref 6.8–8.8)
RBC # BLD AUTO: 3.11 10E12/L (ref 4.4–5.9)
SODIUM SERPL-SCNC: 134 MMOL/L (ref 133–144)
SPECIMEN SOURCE: ABNORMAL
TACROLIMUS BLD-MCNC: <3 UG/L (ref 5–15)
TME LAST DOSE: ABNORMAL H
WBC # BLD AUTO: 10.4 10E9/L (ref 4–11)

## 2020-05-25 PROCEDURE — 85027 COMPLETE CBC AUTOMATED: CPT | Performed by: PHYSICIAN ASSISTANT

## 2020-05-25 PROCEDURE — 25000128 H RX IP 250 OP 636: Performed by: THORACIC SURGERY (CARDIOTHORACIC VASCULAR SURGERY)

## 2020-05-25 PROCEDURE — 36592 COLLECT BLOOD FROM PICC: CPT | Performed by: PHYSICIAN ASSISTANT

## 2020-05-25 PROCEDURE — 99233 SBSQ HOSP IP/OBS HIGH 50: CPT | Mod: GC | Performed by: INTERNAL MEDICINE

## 2020-05-25 PROCEDURE — 83735 ASSAY OF MAGNESIUM: CPT | Performed by: PHYSICIAN ASSISTANT

## 2020-05-25 PROCEDURE — 25000132 ZZH RX MED GY IP 250 OP 250 PS 637: Performed by: STUDENT IN AN ORGANIZED HEALTH CARE EDUCATION/TRAINING PROGRAM

## 2020-05-25 PROCEDURE — 25000132 ZZH RX MED GY IP 250 OP 250 PS 637: Performed by: PHYSICIAN ASSISTANT

## 2020-05-25 PROCEDURE — 25000128 H RX IP 250 OP 636: Performed by: STUDENT IN AN ORGANIZED HEALTH CARE EDUCATION/TRAINING PROGRAM

## 2020-05-25 PROCEDURE — 21400000 ZZH R&B CCU UMMC

## 2020-05-25 PROCEDURE — 82330 ASSAY OF CALCIUM: CPT | Performed by: NURSE PRACTITIONER

## 2020-05-25 PROCEDURE — 25000131 ZZH RX MED GY IP 250 OP 636 PS 637: Performed by: NURSE PRACTITIONER

## 2020-05-25 PROCEDURE — 84100 ASSAY OF PHOSPHORUS: CPT | Performed by: PHYSICIAN ASSISTANT

## 2020-05-25 PROCEDURE — 00000146 ZZHCL STATISTIC GLUCOSE BY METER IP

## 2020-05-25 PROCEDURE — 25800030 ZZH RX IP 258 OP 636: Performed by: THORACIC SURGERY (CARDIOTHORACIC VASCULAR SURGERY)

## 2020-05-25 PROCEDURE — 25000128 H RX IP 250 OP 636: Performed by: PHYSICIAN ASSISTANT

## 2020-05-25 PROCEDURE — 25000132 ZZH RX MED GY IP 250 OP 250 PS 637: Performed by: SURGERY

## 2020-05-25 PROCEDURE — 25000131 ZZH RX MED GY IP 250 OP 636 PS 637: Performed by: PHYSICIAN ASSISTANT

## 2020-05-25 PROCEDURE — 25000131 ZZH RX MED GY IP 250 OP 636 PS 637: Performed by: STUDENT IN AN ORGANIZED HEALTH CARE EDUCATION/TRAINING PROGRAM

## 2020-05-25 PROCEDURE — 80197 ASSAY OF TACROLIMUS: CPT | Performed by: NURSE PRACTITIONER

## 2020-05-25 PROCEDURE — 80076 HEPATIC FUNCTION PANEL: CPT | Performed by: PHYSICIAN ASSISTANT

## 2020-05-25 PROCEDURE — 71045 X-RAY EXAM CHEST 1 VIEW: CPT

## 2020-05-25 PROCEDURE — 80048 BASIC METABOLIC PNL TOTAL CA: CPT | Performed by: PHYSICIAN ASSISTANT

## 2020-05-25 PROCEDURE — 25000132 ZZH RX MED GY IP 250 OP 250 PS 637: Performed by: THORACIC SURGERY (CARDIOTHORACIC VASCULAR SURGERY)

## 2020-05-25 RX ORDER — TACROLIMUS 1 MG/1
1 CAPSULE ORAL
Status: DISCONTINUED | OUTPATIENT
Start: 2020-05-25 | End: 2020-05-27

## 2020-05-25 RX ADMIN — VANCOMYCIN HYDROCHLORIDE 1500 MG: 10 INJECTION, POWDER, LYOPHILIZED, FOR SOLUTION INTRAVENOUS at 09:48

## 2020-05-25 RX ADMIN — HEPARIN SODIUM 5000 UNITS: 5000 INJECTION, SOLUTION INTRAVENOUS; SUBCUTANEOUS at 05:19

## 2020-05-25 RX ADMIN — ALTEPLASE 2 MG: 2.2 INJECTION, POWDER, LYOPHILIZED, FOR SOLUTION INTRAVENOUS at 18:23

## 2020-05-25 RX ADMIN — TACROLIMUS 0.5 MG: 0.5 CAPSULE ORAL at 08:15

## 2020-05-25 RX ADMIN — PANTOPRAZOLE SODIUM 40 MG: 40 TABLET, DELAYED RELEASE ORAL at 08:15

## 2020-05-25 RX ADMIN — METRONIDAZOLE 500 MG: 500 TABLET ORAL at 13:59

## 2020-05-25 RX ADMIN — SULFAMETHOXAZOLE AND TRIMETHOPRIM 1 TABLET: 400; 80 TABLET ORAL at 08:15

## 2020-05-25 RX ADMIN — INSULIN ASPART 16 UNITS: 100 INJECTION, SOLUTION INTRAVENOUS; SUBCUTANEOUS at 13:59

## 2020-05-25 RX ADMIN — MYCOPHENOLATE MOFETIL 1500 MG: 500 TABLET ORAL at 08:15

## 2020-05-25 RX ADMIN — PREDNISONE 30 MG: 20 TABLET ORAL at 18:18

## 2020-05-25 RX ADMIN — METRONIDAZOLE 500 MG: 500 TABLET ORAL at 19:37

## 2020-05-25 RX ADMIN — NYSTATIN 1000000 UNITS: 100000 SUSPENSION ORAL at 16:45

## 2020-05-25 RX ADMIN — VALGANCICLOVIR 900 MG: 450 TABLET, FILM COATED ORAL at 08:15

## 2020-05-25 RX ADMIN — NYSTATIN 1000000 UNITS: 100000 SUSPENSION ORAL at 19:37

## 2020-05-25 RX ADMIN — INSULIN ASPART 6 UNITS: 100 INJECTION, SOLUTION INTRAVENOUS; SUBCUTANEOUS at 08:14

## 2020-05-25 RX ADMIN — INSULIN ASPART 11 UNITS: 100 INJECTION, SOLUTION INTRAVENOUS; SUBCUTANEOUS at 18:47

## 2020-05-25 RX ADMIN — NYSTATIN 1000000 UNITS: 100000 SUSPENSION ORAL at 12:05

## 2020-05-25 RX ADMIN — METRONIDAZOLE 500 MG: 500 TABLET ORAL at 08:15

## 2020-05-25 RX ADMIN — ROSUVASTATIN CALCIUM 10 MG: 10 TABLET, FILM COATED ORAL at 08:15

## 2020-05-25 RX ADMIN — VANCOMYCIN HYDROCHLORIDE 1500 MG: 10 INJECTION, POWDER, LYOPHILIZED, FOR SOLUTION INTRAVENOUS at 21:10

## 2020-05-25 RX ADMIN — ALTEPLASE 2 MG: 2.2 INJECTION, POWDER, LYOPHILIZED, FOR SOLUTION INTRAVENOUS at 23:47

## 2020-05-25 RX ADMIN — PREDNISONE 30 MG: 20 TABLET ORAL at 08:15

## 2020-05-25 RX ADMIN — TACROLIMUS 1 MG: 1 CAPSULE ORAL at 18:18

## 2020-05-25 RX ADMIN — ASPIRIN 81 MG: 81 TABLET, COATED ORAL at 08:15

## 2020-05-25 RX ADMIN — HEPARIN SODIUM 5000 UNITS: 5000 INJECTION, SOLUTION INTRAVENOUS; SUBCUTANEOUS at 13:59

## 2020-05-25 RX ADMIN — NYSTATIN 1000000 UNITS: 100000 SUSPENSION ORAL at 08:15

## 2020-05-25 RX ADMIN — ALTEPLASE 2 MG: 2.2 INJECTION, POWDER, LYOPHILIZED, FOR SOLUTION INTRAVENOUS at 23:46

## 2020-05-25 RX ADMIN — HEPARIN SODIUM 5000 UNITS: 5000 INJECTION, SOLUTION INTRAVENOUS; SUBCUTANEOUS at 21:13

## 2020-05-25 RX ADMIN — MYCOPHENOLATE MOFETIL 1500 MG: 500 TABLET ORAL at 19:37

## 2020-05-25 ASSESSMENT — ACTIVITIES OF DAILY LIVING (ADL)
ADLS_ACUITY_SCORE: 13
ADLS_ACUITY_SCORE: 15
ADLS_ACUITY_SCORE: 13
ADLS_ACUITY_SCORE: 15
ADLS_ACUITY_SCORE: 15
ADLS_ACUITY_SCORE: 13

## 2020-05-25 ASSESSMENT — MIFFLIN-ST. JEOR: SCORE: 1633.67

## 2020-05-25 NOTE — PLAN OF CARE
D: S/p OHT 5/18. Hx. CAD, LV thrombus, CKD 3, PAF, DM2, ICM s/p LVAD HM 3 12/2018 as BTT.  I/A: A&Ox4. VSS on RA. Afebrile. SR/ST 90s-100s. Denies pain. Pacer wires capped. Pericardial drain to suxn, no air leak, minimal serosang output, drsg CDI. BG stable overnight. Adequate uop. Up independently in room. Appeared to rest comfortably overnight.   P: Continue to monitor and notify CVTS with questions/concerns.

## 2020-05-25 NOTE — PROGRESS NOTES
"  [facility]   Cardiology II Service / Advanced Heart Failure  Daily Progress Note  Date of Service: 5/25/2020      Patient: Mariusz Sahrp  MRN: [id number]  Admission Date: 5/17/2020  Hospital Day # 7    Assessment and Plan: Mariusz Sharp is a 59 year old male with PMH of CAD, LV thrombus, CKD Stage III, PAF, DM Type II, and ICM s/p HM III LVAD as BTT with subsequent OHT 5/18/20.    S/p OHT secondary to ICM. 5/18/20. Echo 5/23/20 with EF 65-70% and normal graft function.   RHC today with mRA-7, RV-30/6, mPA-24, mPCWP-15, AMRIT CO-5.12, AMRIT CI-2.62, biopsy pending.   Immunosuppression: Simulect 5/18 and 5/22. Tac 1 mg po BID, tac level-<3, goal-10-12. Cellcept 1500 mg po BID, and Prednisone taper.   Serostatus: CMV: D+, R-, EBV: D- R+, Toxo D- R-  Prophylaxis; Nystatin, Valcyte, and Bactrim  - Continue ASA and Crestor.   - RHC/biopsy done 5/24, path showed ACR: 1R/1A, AMR none     NSVT. Recurrent bursts NSVT with no hemodynamic compromise.   - Electrolytes optimized.   - Continue to monitor    Gram positive bacilli with diphtheroids to LVAD outflow graft.   - Appreciate ID input.   - Continue Vanco through 6/1    Donor positive S Anginosus and Veillonella. Questionable contaminant. Zosyn completed 5/22. Note Staph Anginosus sensitive to Ceftriaxone, Clindamycin, Erythromycin, PCN, and Vanco.   - Appreciate ID input.   - Continue Vanco through 6/1 and Flagyl through 5/26.    DM Type II.   - management per Endocrinology.   ================================================================    Interval History/ROS: He reported feeling well this morning. No reported chest pain, shortness of breath     Last 24 hr care team notes reviewed.   ROS:  4 point ROS including Respiratory, CV, GI and , other than that noted in the HPI, is negative.     Medications: Reviewed in EPIC.     Physical Exam:   /67 (BP Location: Left arm)   Pulse 109   Temp 97.8  F (36.6  C) (Oral)   Resp 16   Ht 1.626 m (5' 4\")   " Wt 89.8 kg (197 lb 14.4 oz)   SpO2 99%   BMI 33.97 kg/m    GENERAL: Appears alert and oriented times three.   HEENT: Eye symmetrical and free of discharge bilaterally. Mucous membranes moist and without lesions.  NECK: Supple and without lymphadenopathy. JVD below clavicular line upright.   CV: Tachycardic, regular. S1S2 present without murmur, rub, or gallop.   RESPIRATORY: Respirations regular, even, and unlabored. Lungs CTA throughout.   GI: Soft and non distended with normoactive bowel sounds present in all quadrants. No tenderness, rebound, guarding. No organomegaly.   EXTREMITIES: +1 bilateral LE peripheral edema. 2+ bilateral pedal pulses.   NEUROLOGIC: Alert and orientated x 3. CN II-XII grossly intact. No focal deficits.   MUSCULOSKELETAL: No joint swelling or tenderness.   SKIN: No jaundice. No rashes or lesions.     Data:  CMP  Recent Labs   Lab 05/25/20  0653 05/24/20  1137 05/24/20  0647 05/23/20  0619 05/23/20  0126 05/22/20  2156  05/22/20  0745  05/19/20  0943  05/18/20  1835    138 138 140 140  --   --  139   < > 141   < > 141   POTASSIUM 5.2 4.3 4.6 3.8 3.8  --    < > 3.2*   < > 4.4   < > 3.7   CHLORIDE 108  --  110* 110* 110*  --   --  107   < > 112*   < > 109   CO2 20  --  20 22 23  --   --  24   < > 21   < > 24   ANIONGAP 6  --  8 8 7  --   --  8   < > 8   < > 8   * 86 91 181* 173*  --   --  191*   < > 128*   < > 115*   BUN 35*  --  36* 31* 35*  --   --  41*   < > 36*   < > 39*   CR 1.31*  --  1.36* 1.32* 1.42*  --   --  1.64*   < > 2.40*   < > 1.90*   GFRESTIMATED 60*  --  57* 59* 54*  --   --  46*   < > 29*   < > 38*   GFRESTBLACK 69  --  66 69 63  --   --  53*   < > 33*   < > 44*   ARLENE 8.2*  --  8.3* 8.2* 7.9*  --   --  8.1*   < > 8.4*   < > 8.1*   MAG 2.1  --  2.1 2.5* 1.8  --   --  2.2  --   --    < > 2.8*   PHOS 3.5  --   --  2.9  --  1.8*  --  1.9*  --   --    < > 2.2*   PROTTOTAL 5.3*  --   --   --   --   --   --  5.7*  --  5.4*  --  5.4*   ALBUMIN 2.6*  --   --   --   --    --   --  2.9*  --  3.0*  --  2.8*   BILITOTAL 0.7  --   --   --   --   --   --  0.5  --  1.0  --  1.5*   ALKPHOS 33*  --   --   --   --   --   --  31*  --  27*  --  34*   AST 23  --   --   --   --   --   --  16  --  98*  --  112*   ALT 47  --   --   --   --   --   --  19  --  26  --  30    < > = values in this interval not displayed.     CBC  Recent Labs   Lab 05/25/20  0653 05/24/20  1137 05/24/20  0647 05/23/20  0619  05/21/20  1028   WBC 10.4  --  9.4 6.7  --  10.6   RBC 3.11*  --  2.93* 2.99*  --  2.98*   HGB 8.1* 7.9* 7.6* 7.7*   < > 7.7*   HCT 26.7*  --  25.8* 26.1*  --  24.8*   MCV 86  --  88 87  --  83   MCH 26.0*  --  25.9* 25.8*  --  25.8*   MCHC 30.3*  --  29.5* 29.5*  --  31.0*   RDW 17.2*  --  17.4* 17.0*  --  16.7*     --  250 216  --  167    < > = values in this interval not displayed.     INR  Recent Labs   Lab 05/18/20  1835   INR 1.54*     Seen and discussed with attending.   Attending addendum to follow.     Katerine Harper  Cardiology PGY4

## 2020-05-25 NOTE — PROGRESS NOTES
IP Diabetes Management  Daily Note           Assessment and Plan:   HPI: Red is a 59 year old male with PMHx TIIDM, HTN, HLP, ischemic heart disease s/p STEMI with ALYSSA 2019, LVAD placement, CKD III and obesity who was admitted 5/17/20 for heart transplant. He underwent heart transplant on 5/18/20 with Dr. Willis. Donor blood cuultures and respiratory cultures returned positive for H flu, staph and strep.    Assessment:   1)Type II Diabetes Mellitus, un controlled (A1c 8.0%), with steroid induced hyperglycemia following heart transplant    After starting lantus 35 units yesterday and NPH 40 BID yesterday (skipped morning NPH dose because went to cath lab) he became hypoglycemic today at noon.   Apparently he requires less insulin than what was calculated over drip.      Plan:   -Continue lantus 35 units daily at am  -Reduce NPH to 30 units BID starting evening , because prednisone decreased and BG trending down(already got  NPH 40 today morning)  -Reduce carb coverage aspart to  1:7 g CHO coverage with meals and snacks/supplements  -hypoglycemia protocol  -regular diet with carb counting protocol    Outpatient follow up: intervally with [facility] endocrine, then recommend establishing with endocrinologist close to home.     Interval History and Assessment: interval glucose trend reviewed:  Recent Labs   Lab 05/25/20  1206 05/25/20  0704 05/25/20  0653 05/25/20  0158 05/24/20  2151 05/24/20  1808 05/24/20  1545  05/24/20  1137  05/24/20  0647  05/23/20  0619  05/23/20  0126  05/22/20  0745   GLC  --   --  127*  --   --   --   --   --  86  --  91  --  181*  --  173*  --  191*   BGM 81 119*  --  173* 225* 124* 91   < >  --    < >  --    < >  --    < >  --    < >  --     < > = values in this interval not displayed.           Current nutritional intake and type: Orders Placed This Encounter      Regular Diet Adult    Steroid planning:   PO Prednisone 40mg BID 5/20-5/21  PO Prednisone 35 mg BID 5/22-5/23  PO Prednisone 30  mg BID 5/24-5/25  PO Prednisone 25 mg BID 5/26-5/27  PO Prednisone 20 mg BID 5/28-?    PTA Diabetes Regimen:   BG monitoring four times daily   Lantus 35 units at bedtime  Novolog 1 unt per 7g CHO with meals/snacks  Novolog 1/25>140AC, >200 HS    Discharge Planning: TBD           Diabetes History:   Type of Diabetes: Type 2 Diabetes Mellitus, Steroid Induced Diabetes Mellitus, stress hyperglycemia  Lab Results   Component Value Date    A1C 8.0 05/12/2020    A1C 9.5 07/05/2019    A1C 9.4 01/11/2019    A1C 10.1 12/30/2018    A1C 10.6 12/20/2018              Review of Systems:   The Review of Systems is negative other than noted in the Interval History.           Medications:     Current Facility-Administered Medications   Medication     acetaminophen (TYLENOL) tablet 650 mg     alteplase (CATHFLO ACTIVASE) injection 2 mg     aspirin EC tablet 81 mg     glucose gel 15-30 g    Or     dextrose 50 % injection 25-50 mL    Or     glucagon injection 1 mg     diphenhydrAMINE (BENADRYL) capsule 25 mg    Or     diphenhydrAMINE (BENADRYL) injection 25 mg     heparin ANTICOAGULANT injection 5,000 Units     heparin lock flush 10 UNIT/ML injection 2-5 mL     HYDROmorphone (PF) (DILAUDID) injection 0.3-0.5 mg     insulin aspart (NovoLOG) injection (RAPID ACTING)     insulin aspart (NovoLOG) injection (RAPID ACTING)     insulin aspart (NovoLOG) injection (RAPID ACTING)     insulin aspart (NovoLOG) injection (RAPID ACTING)     insulin glargine (LANTUS PEN) injection 35 Units     insulin isophane human (HumuLIN N PEN) injection 20 Units     insulin isophane human (HumuLIN N PEN) injection 40 Units     lidocaine (LMX4) cream     lidocaine (LMX4) cream     lidocaine 1 % 0.1-1 mL     lidocaine 1 % 0.1-1 mL     magnesium sulfate 2 g in water intermittent infusion     magnesium sulfate 4 g in 100 mL sterile water (premade)     melatonin tablet 1 mg     methocarbamol (ROBAXIN) tablet 750 mg     metoclopramide (REGLAN) tablet 10 mg    Or      metoclopramide (REGLAN) injection 10 mg     metroNIDAZOLE (FLAGYL) tablet 500 mg     mycophenolate (GENERIC EQUIVALENT) tablet 1,500 mg     naloxone (NARCAN) injection 0.1-0.4 mg     nystatin (MYCOSTATIN) suspension 1,000,000 Units     ondansetron (ZOFRAN-ODT) ODT tab 4 mg    Or     ondansetron (ZOFRAN) injection 4 mg     oxyCODONE (ROXICODONE) tablet 5-10 mg     pantoprazole (PROTONIX) EC tablet 40 mg     potassium chloride (KLOR-CON) Packet 20-40 mEq     potassium chloride 10 mEq in 100 mL intermittent infusion with 10 mg lidocaine     potassium chloride 10 mEq in 100 mL sterile water intermittent infusion (premix)     potassium chloride 20 mEq in 50 mL intermittent infusion     potassium chloride ER (KLOR-CON M) CR tablet 20-40 mEq     potassium phosphate 10 mmol in D5W 250 mL intermittent infusion     potassium phosphate 15 mmol in D5W 250 mL intermittent infusion     potassium phosphate 20 mmol in D5W 250 mL intermittent infusion     potassium phosphate 20 mmol in D5W 500 mL intermittent infusion     potassium phosphate 25 mmol in D5W 500 mL intermittent infusion     predniSONE (DELTASONE) tablet 30 mg    Followed by     [START ON 5/26/2020] predniSONE (DELTASONE) tablet 25 mg    Followed by     [START ON 5/28/2020] predniSONE (DELTASONE) tablet 20 mg     prochlorperazine (COMPAZINE) injection 10 mg    Or     prochlorperazine (COMPAZINE) tablet 10 mg     Reason beta blocker order not selected     rosuvastatin (CRESTOR) tablet 10 mg     senna-docusate (SENOKOT-S/PERICOLACE) 8.6-50 MG per tablet 1 tablet    Or     senna-docusate (SENOKOT-S/PERICOLACE) 8.6-50 MG per tablet 2 tablet     sennosides (SENOKOT) tablet 8.6 mg     sodium chloride (PF) 0.9% PF flush 10 mL     sodium chloride (PF) 0.9% PF flush 10 mL     sodium chloride (PF) 0.9% PF flush 10-20 mL     sodium chloride (PF) 0.9% PF flush 3 mL     sodium chloride (PF) 0.9% PF flush 3 mL     sodium chloride 0.9% infusion     sulfamethoxazole-trimethoprim  "(BACTRIM) 400-80 MG per tablet 1 tablet     tacrolimus (GENERIC EQUIVALENT) capsule 0.5 mg     valGANciclovir (VALCYTE) tablet 900 mg     vancomycin 1500 mg in 0.9% NaCl 250 ml intermittent infusion 1,500 mg            Physical Exam:    /73 (BP Location: Left arm)   Pulse 109   Temp 98.1  F (36.7  C) (Oral)   Resp 16   Ht 1.626 m (5' 4\")   Wt 89.8 kg (197 lb 14.4 oz)   SpO2 97%   BMI 33.97 kg/m    General: no acute distress  Mental status: awake  Resp: no acute distress  Phone visit due to COVID19          Data:     Last Comprehensive Metabolic Panel:  Sodium   Date Value Ref Range Status   05/25/2020 134 133 - 144 mmol/L Final     Potassium   Date Value Ref Range Status   05/25/2020 5.2 3.4 - 5.3 mmol/L Final     Chloride   Date Value Ref Range Status   05/25/2020 108 94 - 109 mmol/L Final     Carbon Dioxide   Date Value Ref Range Status   05/25/2020 20 20 - 32 mmol/L Final     Anion Gap   Date Value Ref Range Status   05/25/2020 6 3 - 14 mmol/L Final     Glucose   Date Value Ref Range Status   05/25/2020 127 (H) 70 - 99 mg/dL Final     Urea Nitrogen   Date Value Ref Range Status   05/25/2020 35 (H) 7 - 30 mg/dL Final     Creatinine   Date Value Ref Range Status   05/25/2020 1.31 (H) 0.66 - 1.25 mg/dL Final     GFR Estimate   Date Value Ref Range Status   05/25/2020 60 (L) >60 mL/min/[1.73_m2] Final     Comment:     Non  GFR Calc  Starting 12/18/2018, serum creatinine based estimated GFR (eGFR) will be   calculated using the Chronic Kidney Disease Epidemiology Collaboration   (CKD-EPI) equation.       Calcium   Date Value Ref Range Status   05/25/2020 8.2 (L) 8.5 - 10.1 mg/dL Final           Ronda Kauffman   Endocrinology Fellow PGY-4  Discussed with staff endocrinologist Dr Dhillon.     --- Addendum----  I discussed about the patient with endocrine fellow Dr. Kauffman. Agree above note and plan.       Yokasta Dhillon MD  Staff Physician  Endocrinology and Metabolism  [facility] " [facility]  License: [place] [id number]  Pager: [phone]

## 2020-05-25 NOTE — PHARMACY-VANCOMYCIN DOSING SERVICE
Pharmacy Vancomycin Note  Date of Service May 24, 2020  Patient's  [id number]   59 year old, male    Indication:  Postoperative Infection and Surgical Prophylaxis  Goal Trough Level: 15-20 mg/L  Day of Therapy: 7  Current Vancomycin regimen:  1750 mg IV q24h    Current estimated CrCl = Estimated Creatinine Clearance: 61 mL/min (A) (based on SCr of 1.36 mg/dL (H)).    Creatinine for last 3 days  2020:  7:45 AM Creatinine 1.64 mg/dL  2020:  1:26 AM Creatinine 1.42 mg/dL;  6:19 AM Creatinine 1.32 mg/dL  2020:  6:47 AM Creatinine 1.36 mg/dL    Recent Vancomycin Levels (past 3 days)  2020:  5:04 PM Vancomycin Level 12.5 mg/L  2020:  5:26 PM Vancomycin Level 11.9 mg/L    Vancomycin IV Administrations (past 72 hours)                   vancomycin (VANCOCIN) 1,750 mg in sodium chloride 0.9 % 250 mL intermittent infusion (mg) 1,750 mg New Bag 20 1812     1,750 mg New Bag 20 1823     1,750 mg New Bag 20 195                Nephrotoxins and other renal medications (From now, onward)    Start     Dose/Rate Route Frequency Ordered Stop    20 0900  vancomycin 1500 mg in 0.9% NaCl 250 ml intermittent infusion 1,500 mg      1,500 mg  over 90 Minutes Intravenous EVERY 12 HOURS 20 1800  tacrolimus (GENERIC EQUIVALENT) capsule 0.5 mg      0.5 mg Oral 2 TIMES DAILY. 20 1408               Contrast Orders - past 72 hours (72h ago, onward)    Start     Dose/Rate Route Frequency Ordered Stop    20 0845  perflutren diluted 1mL to 2mL with saline (OPTISON) diluted injection 6 mL      6 mL Intravenous ONCE 20 0842 20 0845          Interpretation of levels and current regimen:  Trough level is  Subtherapeutic    Has serum creatinine changed > 50% in last 72 hours: Yes    Urine output:  good urine output    Renal Function: Improving    Plan:  1.  Change dose to 1500 mg IV q12h  2.  Pharmacy will check trough levels as appropriate in 1-3 Days.     3.  Vancomycin is planned through 6/1 per ID.    Hannah Kendall, PharmD        .

## 2020-05-25 NOTE — PROGRESS NOTES
Cardiovascular Surgery Progress Note  5/25/2020         Assessment and Plan:     Mariusz Sharp is a 59 year old male history of CAD (s/p STEMI with ALYSSA to LAD 6/27/18), LV thrombus, CKD Stage III, PAF, DM Type II (on insulin), and ICM with EF 20-25% s/p HeartMate 3 LVAD implant 12/31/18, he presented from home (COVID negative) on 5/17/20 with an offer for a donor organ and had an orthotopic heart transplant with removal of ICD with Dr Willis and Dr Baker on 5/18.  He did well in the ICU and was quickly weaned off pressors and extubated POD #1 to 2 lpm via NC with neuro status intact. Endocrine was consulted for transition off of the insulin gtt. ID was consulted after the donor's sputum and blood had growth. He responded well to gentle diuresis before transferring out of the ICU.      Cardiovascular:   Hx VT, systolic HF, AF, and ICM. S/P LVAD Dec, 2018.     S/p orthotopic heart transplant   - No arrhythmia until 5/23, having episodes of nonsustained VT, HD stable.  - Decreased Terbutaline from 10 mg to 5 mg and now off (5/24)   - ASA 81 mg and rosuvastatin   - Cards dosing immunosuppression, Simulect given 5/22.  - First heart biopsy 5/24; 1R and no AMR   Post-op Ventricular Tachycardia  - Multiple episodes on 5/23 and 5/24 am.   - RHC and Bx on 5/24 for VT.  Echo 5/23 without concerns.    Chest tubes: pericardial drain, likely also draining L pleural space.   TPW: capped 5/22, need to discuss with cardiology before removal      Pulmonary:  - Extubated POD 1 to 2 lpm via NC; Saturating well on RA.   - Supplemental O2 PRN to keep sats > 92%. Wean off as tolerated.  - Pulm toilet, IS, activity and deep breathing     Neurology / MSK:  - Neuro intact  - Acute post-operative pain; pain well controlled with acetaminophen, PO oxycodone PRN, IV dilaudid PRN      / Renal:  - Hx stage III renal disease. Most recent creatinine 1.31 and stable, adequate UOP.  - Diuresis per Cardiology.      GI / FEN:   - Regular diet,  continue bowel regimen  - Replace electrolytes as needed, hepatic enzymes WNL.     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  The S anginosus is usually susceptible to cephalosporins and Veillonella to flagyl. After the routine zosyn (ends 5/22), recommended continuing Vancomycin until 6/1 and start flagyl 500 mg tid PO until 5/25.   - The S aureus and the H influenza in the sputum of the donor are of no clinical value in this recipient of the donor's heart.    - WBC WNL, remains afebrile, no signs or symptoms of infection.   - Packing Driveline site BID with moistened Nugauze strips      Hematology:   - Acute blood loss anemia and thrombocytopenia  - Hgb 8.1; Plt WNL, no signs or symptoms of active bleeding  - Considering 1 unit PRBC, cardiology to discuss with staff     Anticoagulation:   - ASA only      Prophylaxis:   - Stress ulcer prophylaxis: Pantoprazole 40 mg daily for 30 days  - DVT prophylaxis: Subcutaneous heparin, SCD     Disposition:   - Transferred to  on 5/21  - Rehab recommending discharge to Home with outpt rehab      Discussed with CVTS Fellow as needed.  Staff surgeons have been informed of changes through both verbal and written communication.      Guillermo Ortiz PA-C  Cardiothoracic Surgery  Pager [phone]    10:31 AM   May 25, 2020        Interval History:     No overnight events.   States pain is well managed on current regimen. Slept ok overnight but not great.  Tolerating diet, is passing flatus, + BM and becoming less soft. No nausea or vomiting.  Breathing well without complaints.   Working with therapies and ambulating in halls without assistance.   Denies chest pain, palpitations, dizziness, syncopal symptoms, fevers, chills, myalgias, or sternal  "popping/clicking.         Physical Exam:   Blood pressure 120/75, pulse 109, temperature 98  F (36.7  C), temperature source Oral, resp. rate 16, height 1.626 m (5' 4\"), weight 89.8 kg (197 lb 14.4 oz), SpO2 99 %.  Vitals:    05/23/20 0234 05/24/20 0310 05/25/20 0500   Weight: 89.5 kg (197 lb 6.4 oz) 91.1 kg (200 lb 12.8 oz) 89.8 kg (197 lb 14.4 oz)      Weight; + 2.2 kg since admit and trending down.   24 hr Fluid status; net loss 1 L.   MAPs: 89 - 104     Gen: A&Ox4, NAD  Neuro: no focal deficits   CV: RRR, normal S1 S2, no murmurs or gallops. Rub sound from pericardial drain?   Pulm: CTA, no wheezing or rhonchi, normal breathing on RA  Abd: nondistended, normal BS, soft, nontender  Ext: trace periperal edema, non-pitting  Incision: clean, dry, intact, no erythema, sternum stable  Tubes/drain sites: dressing clean and dry, serosanguinous output, no air leak. 24 hr output 165 mL.          Data:    Imaging:  reviewed recent imaging, no acute concerns    Labs:  BMP  Recent Labs   Lab 05/25/20  0653 05/24/20  1137 05/24/20  0647 05/23/20  0619 05/23/20  0126    138 138 140 140   POTASSIUM 5.2 4.3 4.6 3.8 3.8   CHLORIDE 108  --  110* 110* 110*   ARLENE 8.2*  --  8.3* 8.2* 7.9*   CO2 20  --  20 22 23   BUN 35*  --  36* 31* 35*   CR 1.31*  --  1.36* 1.32* 1.42*   * 86 91 181* 173*     CBC  Recent Labs   Lab 05/25/20  0653 05/24/20  1137 05/24/20  0647 05/23/20  0619  05/21/20  1028   WBC 10.4  --  9.4 6.7  --  10.6   RBC 3.11*  --  2.93* 2.99*  --  2.98*   HGB 8.1* 7.9* 7.6* 7.7*   < > 7.7*   HCT 26.7*  --  25.8* 26.1*  --  24.8*   MCV 86  --  88 87  --  83   MCH 26.0*  --  25.9* 25.8*  --  25.8*   MCHC 30.3*  --  29.5* 29.5*  --  31.0*   RDW 17.2*  --  17.4* 17.0*  --  16.7*     --  250 216  --  167    < > = values in this interval not displayed.     INR  Recent Labs   Lab 05/18/20  1835 05/18/20  1630 05/18/20  1248   INR 1.54* 2.21* 1.42*      Liver Function Studies -   Recent Labs   Lab Test " 05/22/20  0745   PROTTOTAL 5.7*   ALBUMIN 2.9*   BILITOTAL 0.5   ALKPHOS 31*   AST 16   ALT 19     GLUCOSE:   Recent Labs   Lab 05/25/20  0704 05/25/20  0653 05/25/20  0158 05/24/20  2151 05/24/20  1808 05/24/20  1545 05/24/20  1354  05/24/20  1137  05/24/20  0647  05/23/20  0619  05/23/20  0126  05/22/20  0745   GLC  --  127*  --   --   --   --   --   --  86  --  91  --  181*  --  173*  --  191*   *  --  173* 225* 124* 91 132*   < >  --    < >  --    < >  --    < >  --    < >  --     < > = values in this interval not displayed.

## 2020-05-25 NOTE — PLAN OF CARE
D: OHT 5/18     I: Monitored vitals and assessed pt status.   Changed: No Change  Running: [place], [place] TPA dwelling @ 1825  PRN: NA  Tele: SR/ST  O2: Ra  Mobility: Independent/SBA     A: A0x4. VSS. Afebrile. Urinating adequately. +BM. Ambulated in hallways with bedside RN. Pericardial drain to -20 suction. ~70mL output. No c/o pain, SOB, n/v/d, or chest pain. All incisions CDI. PW capped. Old Driveline site packed, WNL. BG corrected via sliding scale and carb coverage.      Plan for ARU vs home when able to discharge.      P: Continue to monitor Pt status and report changes to CVTS.     Temp:  [97.6  F (36.4  C)-98.2  F (36.8  C)] 97.8  F (36.6  C)  Heart Rate:  [] 101  Resp:  [14-16] 16  BP: (102-138)/(62-87) 105/67  SpO2:  [96 %-99 %] 99 %

## 2020-05-26 ENCOUNTER — CARE COORDINATION (OUTPATIENT)
Dept: CARDIOLOGY | Facility: CLINIC | Age: 60
End: 2020-05-26

## 2020-05-26 ENCOUNTER — HOSPITAL ENCOUNTER (OUTPATIENT)
Facility: CLINIC | Age: 60
End: 2020-05-26
Attending: INTERNAL MEDICINE | Admitting: INTERNAL MEDICINE
Payer: COMMERCIAL

## 2020-05-26 ENCOUNTER — APPOINTMENT (OUTPATIENT)
Dept: PHYSICAL THERAPY | Facility: CLINIC | Age: 60
DRG: 001 | End: 2020-05-26
Attending: THORACIC SURGERY (CARDIOTHORACIC VASCULAR SURGERY)
Payer: COMMERCIAL

## 2020-05-26 DIAGNOSIS — Z94.1 HEART REPLACED BY TRANSPLANT (H): Primary | ICD-10-CM

## 2020-05-26 DIAGNOSIS — I50.21 ACUTE SYSTOLIC HEART FAILURE (H): ICD-10-CM

## 2020-05-26 DIAGNOSIS — Z94.1 HEART REPLACED BY TRANSPLANT (H): ICD-10-CM

## 2020-05-26 LAB
ANION GAP SERPL CALCULATED.3IONS-SCNC: 8 MMOL/L (ref 3–14)
BUN SERPL-MCNC: 41 MG/DL (ref 7–30)
CALCIUM SERPL-MCNC: 8.2 MG/DL (ref 8.5–10.1)
CHLORIDE SERPL-SCNC: 110 MMOL/L (ref 94–109)
CO2 SERPL-SCNC: 20 MMOL/L (ref 20–32)
COPATH REPORT: NORMAL
CREAT SERPL-MCNC: 1.42 MG/DL (ref 0.66–1.25)
ERYTHROCYTE [DISTWIDTH] IN BLOOD BY AUTOMATED COUNT: 17.2 % (ref 10–15)
GFR SERPL CREATININE-BSD FRML MDRD: 54 ML/MIN/{1.73_M2}
GLUCOSE BLDC GLUCOMTR-MCNC: 113 MG/DL (ref 70–99)
GLUCOSE BLDC GLUCOMTR-MCNC: 118 MG/DL (ref 70–99)
GLUCOSE BLDC GLUCOMTR-MCNC: 127 MG/DL (ref 70–99)
GLUCOSE BLDC GLUCOMTR-MCNC: 149 MG/DL (ref 70–99)
GLUCOSE BLDC GLUCOMTR-MCNC: 195 MG/DL (ref 70–99)
GLUCOSE BLDC GLUCOMTR-MCNC: 47 MG/DL (ref 70–99)
GLUCOSE SERPL-MCNC: 63 MG/DL (ref 70–99)
HCT VFR BLD AUTO: 27.2 % (ref 40–53)
HGB BLD-MCNC: 8.1 G/DL (ref 13.3–17.7)
MAGNESIUM SERPL-MCNC: 1.9 MG/DL (ref 1.6–2.3)
MCH RBC QN AUTO: 25.7 PG (ref 26.5–33)
MCHC RBC AUTO-ENTMCNC: 29.8 G/DL (ref 31.5–36.5)
MCV RBC AUTO: 86 FL (ref 78–100)
PHOSPHATE SERPL-MCNC: 4.1 MG/DL (ref 2.5–4.5)
PLATELET # BLD AUTO: 295 10E9/L (ref 150–450)
POTASSIUM SERPL-SCNC: 4.6 MMOL/L (ref 3.4–5.3)
RBC # BLD AUTO: 3.15 10E12/L (ref 4.4–5.9)
SODIUM SERPL-SCNC: 138 MMOL/L (ref 133–144)
TACROLIMUS BLD-MCNC: <3 UG/L (ref 5–15)
TME LAST DOSE: ABNORMAL H
WBC # BLD AUTO: 11.3 10E9/L (ref 4–11)

## 2020-05-26 PROCEDURE — 25000132 ZZH RX MED GY IP 250 OP 250 PS 637: Performed by: PHYSICIAN ASSISTANT

## 2020-05-26 PROCEDURE — 21400000 ZZH R&B CCU UMMC

## 2020-05-26 PROCEDURE — 25000128 H RX IP 250 OP 636: Performed by: THORACIC SURGERY (CARDIOTHORACIC VASCULAR SURGERY)

## 2020-05-26 PROCEDURE — 25000132 ZZH RX MED GY IP 250 OP 250 PS 637: Performed by: STUDENT IN AN ORGANIZED HEALTH CARE EDUCATION/TRAINING PROGRAM

## 2020-05-26 PROCEDURE — 00000146 ZZHCL STATISTIC GLUCOSE BY METER IP

## 2020-05-26 PROCEDURE — 83735 ASSAY OF MAGNESIUM: CPT | Performed by: PHYSICIAN ASSISTANT

## 2020-05-26 PROCEDURE — 80048 BASIC METABOLIC PNL TOTAL CA: CPT | Performed by: PHYSICIAN ASSISTANT

## 2020-05-26 PROCEDURE — 97116 GAIT TRAINING THERAPY: CPT | Mod: GP

## 2020-05-26 PROCEDURE — 25000131 ZZH RX MED GY IP 250 OP 636 PS 637: Performed by: PHYSICIAN ASSISTANT

## 2020-05-26 PROCEDURE — 25000128 H RX IP 250 OP 636: Performed by: PHYSICIAN ASSISTANT

## 2020-05-26 PROCEDURE — 25000131 ZZH RX MED GY IP 250 OP 636 PS 637: Performed by: STUDENT IN AN ORGANIZED HEALTH CARE EDUCATION/TRAINING PROGRAM

## 2020-05-26 PROCEDURE — 99232 SBSQ HOSP IP/OBS MODERATE 35: CPT | Mod: 25 | Performed by: NURSE PRACTITIONER

## 2020-05-26 PROCEDURE — 36415 COLL VENOUS BLD VENIPUNCTURE: CPT | Performed by: PHYSICIAN ASSISTANT

## 2020-05-26 PROCEDURE — 80197 ASSAY OF TACROLIMUS: CPT | Performed by: NURSE PRACTITIONER

## 2020-05-26 PROCEDURE — 25000132 ZZH RX MED GY IP 250 OP 250 PS 637: Performed by: SURGERY

## 2020-05-26 PROCEDURE — 25800030 ZZH RX IP 258 OP 636: Performed by: THORACIC SURGERY (CARDIOTHORACIC VASCULAR SURGERY)

## 2020-05-26 PROCEDURE — 85027 COMPLETE CBC AUTOMATED: CPT | Performed by: PHYSICIAN ASSISTANT

## 2020-05-26 PROCEDURE — 84100 ASSAY OF PHOSPHORUS: CPT | Performed by: PHYSICIAN ASSISTANT

## 2020-05-26 PROCEDURE — 25000132 ZZH RX MED GY IP 250 OP 250 PS 637: Performed by: THORACIC SURGERY (CARDIOTHORACIC VASCULAR SURGERY)

## 2020-05-26 RX ADMIN — PREDNISONE 25 MG: 5 TABLET ORAL at 17:42

## 2020-05-26 RX ADMIN — SULFAMETHOXAZOLE AND TRIMETHOPRIM 1 TABLET: 400; 80 TABLET ORAL at 07:36

## 2020-05-26 RX ADMIN — INSULIN ASPART 6 UNITS: 100 INJECTION, SOLUTION INTRAVENOUS; SUBCUTANEOUS at 13:26

## 2020-05-26 RX ADMIN — VANCOMYCIN HYDROCHLORIDE 1500 MG: 10 INJECTION, POWDER, LYOPHILIZED, FOR SOLUTION INTRAVENOUS at 21:35

## 2020-05-26 RX ADMIN — NYSTATIN 1000000 UNITS: 100000 SUSPENSION ORAL at 07:36

## 2020-05-26 RX ADMIN — INSULIN HUMAN 20 UNITS: 100 INJECTION, SUSPENSION SUBCUTANEOUS at 09:31

## 2020-05-26 RX ADMIN — HEPARIN SODIUM 5000 UNITS: 5000 INJECTION, SOLUTION INTRAVENOUS; SUBCUTANEOUS at 05:19

## 2020-05-26 RX ADMIN — TACROLIMUS 1 MG: 1 CAPSULE ORAL at 17:41

## 2020-05-26 RX ADMIN — MYCOPHENOLATE MOFETIL 1500 MG: 500 TABLET ORAL at 07:35

## 2020-05-26 RX ADMIN — PREDNISONE 25 MG: 5 TABLET ORAL at 07:36

## 2020-05-26 RX ADMIN — MAGNESIUM SULFATE IN WATER 2 G: 40 INJECTION, SOLUTION INTRAVENOUS at 08:26

## 2020-05-26 RX ADMIN — VANCOMYCIN HYDROCHLORIDE 1500 MG: 10 INJECTION, POWDER, LYOPHILIZED, FOR SOLUTION INTRAVENOUS at 09:32

## 2020-05-26 RX ADMIN — ROSUVASTATIN CALCIUM 10 MG: 10 TABLET, FILM COATED ORAL at 07:36

## 2020-05-26 RX ADMIN — ASPIRIN 81 MG: 81 TABLET, COATED ORAL at 07:36

## 2020-05-26 RX ADMIN — HEPARIN SODIUM 5000 UNITS: 5000 INJECTION, SOLUTION INTRAVENOUS; SUBCUTANEOUS at 21:35

## 2020-05-26 RX ADMIN — HEPARIN SODIUM 5000 UNITS: 5000 INJECTION, SOLUTION INTRAVENOUS; SUBCUTANEOUS at 13:28

## 2020-05-26 RX ADMIN — MYCOPHENOLATE MOFETIL 1500 MG: 500 TABLET ORAL at 19:58

## 2020-05-26 RX ADMIN — NYSTATIN 1000000 UNITS: 100000 SUSPENSION ORAL at 15:43

## 2020-05-26 RX ADMIN — INSULIN ASPART 9 UNITS: 100 INJECTION, SOLUTION INTRAVENOUS; SUBCUTANEOUS at 08:12

## 2020-05-26 RX ADMIN — PANTOPRAZOLE SODIUM 40 MG: 40 TABLET, DELAYED RELEASE ORAL at 07:36

## 2020-05-26 RX ADMIN — NYSTATIN 1000000 UNITS: 100000 SUSPENSION ORAL at 19:58

## 2020-05-26 RX ADMIN — NYSTATIN 1000000 UNITS: 100000 SUSPENSION ORAL at 13:27

## 2020-05-26 RX ADMIN — INSULIN ASPART 8 UNITS: 100 INJECTION, SOLUTION INTRAVENOUS; SUBCUTANEOUS at 18:56

## 2020-05-26 RX ADMIN — VALGANCICLOVIR 900 MG: 450 TABLET, FILM COATED ORAL at 07:36

## 2020-05-26 RX ADMIN — Medication 5 ML: at 05:11

## 2020-05-26 RX ADMIN — TACROLIMUS 1 MG: 1 CAPSULE ORAL at 07:36

## 2020-05-26 ASSESSMENT — MIFFLIN-ST. JEOR: SCORE: 1626.86

## 2020-05-26 ASSESSMENT — ACTIVITIES OF DAILY LIVING (ADL)
ADLS_ACUITY_SCORE: 13

## 2020-05-26 NOTE — PROGRESS NOTES
Transplant Social Work Services Progress Note      Date of Initial Social Work Evaluation: 7/17/18. Admission assessment completed 5/18/20  Collaborated with: Red, and Brother, along with accommodations for apartment    Data: Patient remains on 6C recovering from heart transplant and progressing well. Received a call from patient's Brother about getting into an apartment. Was informed that there was an apartment available.    Intervention: Connected Brother with accommodations to arrange to get into [address]. Spoke with Red via phone. Inquired into questions and concerns. Assessed coping and offered support. Spoke about the process of how to write a letter to the donor family and updated Red about the apartment being available.    Assessment: Red displayed a bright affect over the phone. Laughed and used humor. Voices no concerns. Interested in writing a letter. Reports looking forward to discharging to the apartment later this week. No other needs identified.  Education provided by SW: Process of writing letters to donor family and the process of getting into apartment  Plan:    Discharge Plans in Progress: [address] with Brother later this week    Barriers to d/c plan: Medical condition-recovering from transplant    Follow up Plan: SW will continue to follow for ongoing psychosocial issues post heart transplant

## 2020-05-26 NOTE — PROGRESS NOTES
Cardiovascular Surgery Progress Note  5/25/2020         Assessment and Plan:     Mariusz Sharp is a 59 year old male history of CAD (s/p STEMI with ALYSSA to LAD 6/27/18), LV thrombus, CKD Stage III, PAF, DM Type II (on insulin), and ICM with EF 20-25% s/p HeartMate 3 LVAD implant 12/31/18, he presented from home (COVID negative) on 5/17/20 with an offer for a donor organ and had an orthotopic heart transplant with removal of ICD with Dr Willis and Dr Baker on 5/18.  He did well in the ICU and was quickly weaned off pressors and extubated POD #1 to 2 lpm via NC with neuro status intact. Endocrine was consulted for transition off of the insulin gtt. ID was consulted after the donor's sputum and blood had growth. He responded well to gentle diuresis before transferring out of the ICU.      Cardiovascular:   Hx VT, systolic HF, AF, and ICM. S/P LVAD Dec, 2018.     S/p orthotopic heart transplant 5/18/2020  - No arrhythmia until 5/23, having episodes of nonsustained VT, HD stable.  - Decreased Terbutaline from 10 mg to 5 mg and now off (5/24), HR's 80-90's. No recurrent VT   - ASA 81 mg and rosuvastatin   - Cards dosing immunosuppression, Simulect given 5/22.  - First heart biopsy 5/24; 1R and no AMR   Post-op Ventricular Tachycardia  - Multiple episodes on 5/23 and 5/24 am.   - RHC and Bx on 5/24 for VT.  Echo 5/23 without concerns.    Chest tubes: pericardial drain, likely also draining L pleural space. 24 hour output 150 ml, no output in last 8 hours. Continue to monitor another day or so and repeat CXR prior to removing.   TPW: capped 5/22, need to discuss with cardiology before removal      Pulmonary:  - Extubated POD 1 to 2 lpm via NC; Saturating well on RA.   - Supplemental O2 PRN to keep sats > 92%.   - currently on room air with good lilian   - Pulm toilet, IS, activity and deep breathing     Neurology / MSK:  - Neuro intact  - Acute post-operative pain; pain well controlled with acetaminophen, PO oxycodone PRN,  IV dilaudid PRN      / Renal:  - Hx stage III renal disease. Most recent creatinine up slightly to 1.42, still stable, adequate UOP.  - last lasix dose 5/22, diuresis per Cardiology.   - weight down 0.8 kg from yesterday, down 2.9 kg from peak of 92 kg 5/20.     GI / FEN:   - Regular diet, continue bowel regimen  - Replace electrolytes as needed, hepatic enzymes WNL.     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  The S anginosus is usually susceptible to cephalosporins and Veillonella to flagyl. After the routine zosyn (ends 5/22), recommended continuing Vancomycin until 6/1 and start flagyl 500 mg tid PO until 5/25.   - The S aureus and the H influenza in the sputum of the donor are of no clinical value in this recipient of the donor's heart.    - WBC up slightly at 11.3, remains afebrile, no signs or symptoms of infection.   - Packing Driveline site BID with moistened Nugauze strips      Hematology:   - Acute blood loss anemia and thrombocytopenia  - Hgb 8.1; Plt WNL, no signs or symptoms of active bleeding     Anticoagulation:   - ASA only      Prophylaxis:   - Stress ulcer prophylaxis: Pantoprazole 40 mg daily for 30 days  - DVT prophylaxis: Subcutaneous heparin, SCD     Disposition:   - Transferred to  on 5/21  - Rehab recommending discharge to Home with outpt rehab      Discussed with CVTS Fellow as needed.  Staff surgeons have been informed of changes through both verbal and written communication.      Damari Ohara, LEX, CNP  Cardiothoracic Surgery  Pager [phone]          Interval History:     No overnight events.   States pain is well managed on current regimen. Slept ok overnight.  Tolerating diet, is passing flatus, + BM. No nausea or vomiting.  Breathing well without complaints.  "  Working with therapies and ambulating in halls without assistance.   Denies chest pain, palpitations, dizziness, syncopal symptoms, fevers, chills, myalgias, or sternal popping/clicking.         Physical Exam:   Blood pressure 109/72, pulse 94, temperature 98.3  F (36.8  C), temperature source Oral, resp. rate 16, height 1.626 m (5' 4\"), weight 89.1 kg (196 lb 6.4 oz), SpO2 95 %.  Vitals:    05/24/20 0310 05/25/20 0500 05/26/20 0250   Weight: 91.1 kg (200 lb 12.8 oz) 89.8 kg (197 lb 14.4 oz) 89.1 kg (196 lb 6.4 oz)      Weight; weight down 0.8 kg from yesterday, down 2.9 kg from peak of 92 kg 5/20.   24 hr Fluid status; 24 hour net loss 900 ml, net - 6.5 L   MAPs: 89 - 104     Gen: A&Ox4, NAD  Neuro: no focal deficits   CV: RRR, normal S1 S2, no murmurs or gallops. No rub noted.    Pulm: CTA, no wheezing or rhonchi, normal breathing on RA  Abd: nondistended, normal BS, soft, nontender  Ext: trace periperal edema, non-pitting  Incision: clean, dry, intact, no erythema, sternum stable  Tubes/drain sites: dressing clean and dry, serosanguinous output, no air leak, some tidaling with cough. 24 hr output 150 mL.          Data:    Imaging:  reviewed recent imaging, no acute concerns    CXR IMPRESSION:  1. Stable positioning of right upper extremity PICC line with tip  terminating near the cavoatrial junction.  2. Stable linear basilar opacities, likely atelectasis.    Labs:  BMP  Recent Labs   Lab 05/26/20  0518 05/25/20  0653 05/24/20  1137 05/24/20  0647 05/23/20  0619    134 138 138 140   POTASSIUM 4.6 5.2 4.3 4.6 3.8   CHLORIDE 110* 108  --  110* 110*   ARLENE 8.2* 8.2*  --  8.3* 8.2*   CO2 20 20  --  20 22   BUN 41* 35*  --  36* 31*   CR 1.42* 1.31*  --  1.36* 1.32*   GLC 63* 127* 86 91 181*     CBC  Recent Labs   Lab 05/26/20  0518 05/25/20  0653 05/24/20  1137 05/24/20  0647 05/23/20  0619   WBC 11.3* 10.4  --  9.4 6.7   RBC 3.15* 3.11*  --  2.93* 2.99*   HGB 8.1* 8.1* 7.9* 7.6* 7.7*   HCT 27.2* 26.7*  --  " 25.8* 26.1*   MCV 86 86  --  88 87   MCH 25.7* 26.0*  --  25.9* 25.8*   MCHC 29.8* 30.3*  --  29.5* 29.5*   RDW 17.2* 17.2*  --  17.4* 17.0*    267  --  250 216     INR  No lab results found in last 7 days.   Liver Function Studies -   Recent Labs   Lab Test 05/22/20  0745   PROTTOTAL 5.7*   ALBUMIN 2.9*   BILITOTAL 0.5   ALKPHOS 31*   AST 16   ALT 19     GLUCOSE:   Recent Labs   Lab 05/26/20  0803 05/26/20  0731 05/26/20  0518 05/26/20  0152 05/25/20  2202 05/25/20  1802 05/25/20  1206  05/25/20  0653  05/24/20  1137  05/24/20  0647  05/23/20  0619  05/23/20  0126   GLC  --   --  63*  --   --   --   --   --  127*  --  86  --  91  --  181*  --  173*   * 47*  --  118* 161* 107* 81   < >  --    < >  --    < >  --    < >  --    < >  --     < > = values in this interval not displayed.

## 2020-05-26 NOTE — PROGRESS NOTES
[facility]   Cardiology II Service / Advanced Heart Failure  Daily Progress Note  Date of Service: 5/26/2020      Patient: Mariusz Sharp  MRN: [id number]  Admission Date: 5/17/2020  Hospital Day # 8    Assessment and Plan: Mariusz Sharp is a 59 year old male with PMH of CAD, LV thrombus, CKD Stage III, PAF, DM Type II, and ICM s/p HM III LVAD as BTT with subsequent OHT 5/18/20.     S/p OHT secondary to ICM. 5/18/20. Echo 5/23/20 with EF 65-70% and normal graft function.   RHC 5/24 with mRA-7, RV-30/6, mPA-24, mPCWP-15, AMRIT CO-5.12, AMRIT CI-2.62, biopsy negative.   Immunosuppression: Simulect 5/18 and 5/22. Tac 1 mg po BID, tac level < 3, goal-10-12. Repeat level in AM. Cellcept 1500 mg po BID, and Prednisone taper.   Serostatus: CMV: D+, R-, EBV: D- R+, Toxo D- R-  Prophylaxis; Nystatin, Valcyte, and Bactrim  - Continue ASA and Crestor.   - Next RHC/boipsy due 6/1.     NSVT. Recurrent bursts NSVT with no hemodynamic compromise.   - Electrolytes optimized.    - RHC/biopsy negative 5/24.     Gram positive bacilli with diphtheroids to LVAD outflow graft.   - Appreciate ID input.   - Continue Vanco through 6/1.     Donor positive S Anginosus and Veillonella. Questionable contaminant. Zosyn completed 5/22. Note Staph Anginosus sensitive to Ceftriaxone, Clindamycin, Erythromycin, PCN, and Vanco. Flagyl completed today.   - Appreciate ID input.   - Continue Vanco through 6/1.     DM Type II.   - management per Endocrinology.   ================================================================    Interval History/ROS: He notes mld LE edema. He denies fever, chills, oral ulcerations, chest pain, palpitations, cough, nausea, vomiting, and diarrhea. He is tolerating oral intake and ambulating well.     Last 24 hr care team notes reviewed.   ROS:  4 point ROS including Respiratory, CV, GI and , other than that noted in the HPI, is negative.     Medications: Reviewed in EPIC.     Physical Exam:   /89 (BP  "Location: Right leg)   Pulse 95   Temp 97.5  F (36.4  C) (Oral)   Resp 16   Ht 1.626 m (5' 4\")   Wt 89.1 kg (196 lb 6.4 oz)   SpO2 96%   BMI 33.71 kg/m    GENERAL: Appears alert and oriented times three.   HEENT: Eye symmetrical and free of discharge bilaterally. Mucous membranes moist and without lesions.  NECK: Supple and without lymphadenopathy. JVD below clavicular line upright.   CV: RRR, S1S2 present without murmur, rub, or gallop.   RESPIRATORY: Respirations regular, even, and unlabored. Lungs CTA throughout.   GI: Soft and non distended with normoactive bowel sounds present in all quadrants. No tenderness, rebound, guarding. No organomegaly.   EXTREMITIES: Trace bilateral LE peripheral edema. 2+ bilateral pedal pulses.   NEUROLOGIC: Alert and orientated x 3. CN II-XII grossly intact. No focal deficits.   MUSCULOSKELETAL: No joint swelling or tenderness.   SKIN: No jaundice. No rashes or lesions.     Data:  CMP  Recent Labs   Lab 05/26/20  0518 05/25/20  0653 05/24/20  1137 05/24/20  0647 05/23/20  0619  05/22/20  2156  05/22/20  0745    134 138 138 140   < >  --   --  139   POTASSIUM 4.6 5.2 4.3 4.6 3.8   < >  --    < > 3.2*   CHLORIDE 110* 108  --  110* 110*   < >  --   --  107   CO2 20 20  --  20 22   < >  --   --  24   ANIONGAP 8 6  --  8 8   < >  --   --  8   GLC 63* 127* 86 91 181*   < >  --   --  191*   BUN 41* 35*  --  36* 31*   < >  --   --  41*   CR 1.42* 1.31*  --  1.36* 1.32*   < >  --   --  1.64*   GFRESTIMATED 54* 60*  --  57* 59*   < >  --   --  46*   GFRESTBLACK 63 69  --  66 69   < >  --   --  53*   ARLENE 8.2* 8.2*  --  8.3* 8.2*   < >  --   --  8.1*   MAG 1.9 2.1  --  2.1 2.5*   < >  --   --  2.2   PHOS 4.1 3.5  --   --  2.9  --  1.8*  --  1.9*   PROTTOTAL  --  5.3*  --   --   --   --   --   --  5.7*   ALBUMIN  --  2.6*  --   --   --   --   --   --  2.9*   BILITOTAL  --  0.7  --   --   --   --   --   --  0.5   ALKPHOS  --  33*  --   --   --   --   --   --  31*   AST  --  23  --   " --   --   --   --   --  16   ALT  --  47  --   --   --   --   --   --  19    < > = values in this interval not displayed.     CBC  Recent Labs   Lab 05/26/20  0518 05/25/20  0653 05/24/20  1137 05/24/20  0647 05/23/20  0619   WBC 11.3* 10.4  --  9.4 6.7   RBC 3.15* 3.11*  --  2.93* 2.99*   HGB 8.1* 8.1* 7.9* 7.6* 7.7*   HCT 27.2* 26.7*  --  25.8* 26.1*   MCV 86 86  --  88 87   MCH 25.7* 26.0*  --  25.9* 25.8*   MCHC 29.8* 30.3*  --  29.5* 29.5*   RDW 17.2* 17.2*  --  17.4* 17.0*    267  --  250 216     Patient discussed with Dr. Nielson.     Antonia Byrne [facility]  5/26/2020

## 2020-05-26 NOTE — PLAN OF CARE
D: OHT 5/18/2020     I: Monitored vitals and assessed pt status.   Changed: Old DL dressing to be changed BID  Running: DL PICC SL  PRN: Mag replaced per protocol - Recheck in AM  Tele: SR/ST with activity  O2: RA  Mobility: Independent/SBA     A: A0x4. VSS. Afebrile. Urinating adequately. Makes needs known. Pericardial drain to -20 suction. ~60mL output. Site WNL. Old DL site WNL, packing in place.  No c/o pain, SOB, n/v/d, or chest pain.  Plan to pull pacer wires tomorrow per CVTS.     Discharge to [facility] when medically stable.     P: Continue to monitor Pt status and report changes to CVTS.     Temp:  [97.8  F (36.6  C)-98.7  F (37.1  C)] 98.3  F (36.8  C)  Pulse:  [94] 94  Heart Rate:  [] 90  Resp:  [14-16] 16  BP: (104-129)/(67-85) 109/72  SpO2:  [95 %-99 %] 95 %   MONI CHEEMA IN On license of [facility]  LAST OV 12/4/17  LAST FILL DATE 6/13/2017    RX CALLED INTO THE PHARMACY

## 2020-05-26 NOTE — PROGRESS NOTES
Care Coordinator Progress Note    Admission Date/Time:  5/17/2020  Attending MD:  Elder Willis  Data  Chart reviewed, discussed with interdisciplinary team.   Patient was admitted for:    Biventricular heart failure (H)  Biventricular heart failure (H)  Status post LVAD explant and now s/p heart transplantation on 5/18/2020.     Concerns with insurance coverage for discharge needs: None stated by pt.  Current Living Situation: Patient told this writer that he will be staying at the [facility] with his brother Romeo upon discharge.   Support System: Supportive and Involved brother.   Services Involved: none currently.   Transportation at Discharge: Family or friend will provide  Transportation to Medical Appointments:   - Name of caregiver: brotherRomeo.   Barriers to Discharge: post-op recovery.     Coordination of Care and Referrals: No home care needs anticipated.   Assessment  Physical Therapy is recommending OPCR; pt is in agreement with this plan.   Plan  Anticipated Discharge Date:  TBD  Anticipated Discharge Plan:  Discharge locally to the [facility].  RN CC will continue to monitor pt's medical condition and progress toward discharge.   BRIAN PARKER RN BSN  Care Coordinator Unit   [phone]

## 2020-05-26 NOTE — PROGRESS NOTES
Donor Culture Results:    Preliminary / Final positive donor blood/urine/sputum culture results have been uploaded into [facility]. Donor ID [id number].  Dr. Nielson notified of results.

## 2020-05-26 NOTE — PROGRESS NOTES
Patient had question about keeping his VAD pump after his transplant, and what he needed to do to keep it. VAD Coordinator would need to coordinate with [employer], the company that makes the Heartmate pump, to make sure they would be able to send the pump back to him after it has been properly processed. Patient gave permission to share his contact information with [employer] in order to make that happen.

## 2020-05-26 NOTE — PROGRESS NOTES
Diabetes Consult Daily  Progress Note          Assessment/Plan:         Mariusz Moon is a 59 year old male with PMHx TIIDM, HTN, HLP, ischemic heart disease s/p STEMI with ALYSSA 2019, LVAD placement, CKD III and obesity who was admitted 5/17/20 for heart transplant. He underwent heart transplant on 5/18/20 with Dr. Willis. Donor blood cuultures and respiratory cultures returned positive for H flu, staph and strep     Type II Diabetes Mellitus: uncontrolled (A1c 8.0%), with steroid induced hyperglycemia following heart transplant      Plan:  - discontinue lantus 35 units  - discontinue current NPH orders  -changed NPH to 20 units with Prednisone 25 mg ( 0830)  -NPH 20 units with  Prednisone 25 mg ( 1800)  -novolog 1 unit per 7 grams of CHO for meals/snacks/supplements  -novolog high intensity sliding scale before meals and HS  -monitor glucose before meals, HS and 0200  -hypoglycemia protocol  -will continue to follow     Outpatient diabetes follow up: TBD  Plan discussed with patient and bedside RN    Discharge: plan to [facility] when medically stable           Interval History:   The last 24 hours progress and nursing notes reviewed.  Blood sugars have been variable and hypoglycemic with glcusoe of 47 this am on lantus + NPH  Was unaware that his blood sugars was 47 this am  Appetite is reported as good, denies nausea and or vomiting      Steroid taper:  Prednisone 25 mg twice dialy starting 5/26 x 4 doses  Prednisone 20 mg twice daily ( starting 5/28 at 0800)        Recent Labs   Lab 05/26/20  0731 05/26/20  0518 05/26/20  0152 05/25/20  2202 05/25/20  1802 05/25/20  1206 05/25/20  0704 05/25/20  0653  05/24/20  1137  05/24/20  0647  05/23/20  0619  05/23/20  0126   GLC  --  63*  --   --   --   --   --  127*  --  86  --  91  --  181*  --  173*   BGM 47*  --  118* 161* 107* 81 119*  --    < >  --    < >  --    < >  --    < >  --     < > = values in this interval not displayed.             "   Review of Systems:   See interval hx          Medications:     Orders Placed This Encounter      Regular Diet Adult       Physical Exam:  Gen: Alert, sitting in chair,NAD   HEENT: mucous membranes are moist  Resp: non-labored  Ext: moving all extremities   Neuro:oriented x3, communicating clearly  /72 (BP Location: Right arm)   Pulse 94   Temp 98.3  F (36.8  C) (Oral)   Resp 16   Ht 1.626 m (5' 4\")   Wt 89.1 kg (196 lb 6.4 oz)   SpO2 95%   BMI 33.71 kg/m             Data:     Lab Results   Component Value Date    A1C 8.0 05/12/2020    A1C 9.5 07/05/2019    A1C 9.4 01/11/2019    A1C 10.1 12/30/2018    A1C 10.6 12/20/2018              CBC RESULTS:   Recent Labs   Lab Test 05/26/20  0518   WBC 11.3*   RBC 3.15*   HGB 8.1*   HCT 27.2*   MCV 86   MCH 25.7*   MCHC 29.8*   RDW 17.2*        Recent Labs   Lab Test 05/26/20  0518 05/25/20  0653    134   POTASSIUM 4.6 5.2   CHLORIDE 110* 108   CO2 20 20   ANIONGAP 8 6   GLC 63* 127*   BUN 41* 35*   CR 1.42* 1.31*   ARLENE 8.2* 8.2*     Liver Function Studies -   Recent Labs   Lab Test 05/25/20  0653   PROTTOTAL 5.3*   ALBUMIN 2.6*   BILITOTAL 0.7   ALKPHOS 33*   AST 23   ALT 47     Lab Results   Component Value Date    INR 1.54 05/18/2020    INR 2.21 05/18/2020    INR 1.42 05/18/2020    INR 2.19 05/18/2020    INR 2.21 05/15/2020    INR 2.76 05/14/2020    INR 2.88 05/13/2020    INR 2.47 05/12/2020    INR 2.5 05/11/2020    INR 2.4 04/24/2020    INR 2.3 04/03/2020    INR 2.5 03/20/2020         I spent a total of 25 minutes bedside and on the inpatient unit managing the glycemic care of Mariusz Sharp. Over 50% of my time on the unit was spent counseling the patient  and/or coordinating care regarding .  See note for details.      Shelley Vargas -[phone]  Diabetes Management job code [id number]            "

## 2020-05-26 NOTE — PLAN OF CARE
6C PT -   Discharge Planner PT   Patient plan for discharge: home  Current status: patient demonstrated good functional mobility and IND for bed mobility, transfers, and gait. Patient safely navigated stair mod I with handrail support.   Barriers to return to prior living situation: medical status  Recommendations for discharge: home with OP cardiac rehab  Rationale for recommendations: patient will be appropriate to return home from PT perspective when medically stable and will benefit from OP cardiac rehab for further endurance training. OT to continue to follow in house.   Entered by: Dayo Miguel 05/26/2020 4:51 PM             Physical Therapy Discharge Summary    Reason for therapy discharge:    All goals and outcomes met, no further needs identified.    Progress towards therapy goal(s). See goals on Care Plan in [facility] electronic health record for goal details.  Goals met    Therapy recommendation(s):    Continued therapy is recommended.  Rationale/Recommendations:  patient will benefit from continued OT in house for cardiac rehab and OP cardiac rehab at time of discharge.

## 2020-05-26 NOTE — PROGRESS NOTES
[facility]    Transplant Infectious Diseases Inpatient Progress Note      Mariusz Sharp MRN# [id number]   YOB: 1960 Age: 59 year old   Date of Admission and time: 5/17/2020 11:24 PM             Recommendations:   1. May discontinue vancomycin IV and start PO doxycycline 100 mg bid till 6/1/2020 (total of 14 days).   2. CMV and PJP ppx per your protocol.     ID will sign off, please call for questions.         Summary of Presentation:   Transplants:  5/18/2020 (Heart), Postoperative day:  8     This patient is a 59 year old male with ICMP s/p OHT basiliximab for induction, MMF/prednisone as of now for maintenance IS.   Donor with positive blood and respiratory cx.         Active Problems and Infectious Diseases Issues:   1. At risk for donor-derived infection.   The donor blood cx grew S anginosus and Veillonella sp in one set out of 2 sets obtained 5/16/2020, this is likely a contaminant but we treated with vancomycin zosyn then vancomycin flagyl for total of 7 days.     The MSSA and the H influenza in the sputum of the donor are of no clinical value in this recipient of the donor's heart.      2. Positive C acnes from the ventricular assistive device site which was removed during OHT.   The growth occurred 4 days later and only in the broth. This is likely a contaminant but given the OHT will empirically treat for total of 14 days.  Since the GPB is now identified as C acnes, we can now switch ABx to PO doxycycline.         Old Problems and Infectious Diseases Issues:   None.     Other Infectious Disease issues include:  - PCP prophylaxis: per your protocol.   - Serostatus: CMV D+/R-, EBV D-/R+, HSV1?/2?, VZV +, Toxo D-/R-  - Immunization status: Too early to be discussed.   - Gamma globulin status: ?      Attestation:  I interviewed the patient and obtained history from the patient, and by reviewing the patient's chart including outside records, microbiological data,  and radiological data. All data are summarized in this notes.  Tavares Rodriguez MD   Pager: [phone]  05/26/2020           Interim History:   No new complaints.   2 tubes were removed 5/22/2020.  No events over the holiday weekend.          History of Present Illness:   Transplants:  5/18/2020 (Heart), Postoperative day:  8     This patient is a 59 year old male with ICMP admitted to undergo OHT with no complications.   The early post-operative course is not eventful. The patient was extubated within 24 hr.   The patient has no complaints except for diarrhea induced by senna.     The donor blood cx grew S anginosus and Veillonella. And the sputum grew H influenza and S aureus.     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   He lives in [place] in a house with his father who has dementia.   He fishes.   His last travel outside of [place] was to [facility] in 2019.   No institutionalization.                   Allergies:   No Known Allergies          Medications:   Medications that Require Transfusion:     BETA BLOCKER NOT PRESCRIBED       sodium chloride 10 mL/hr at 05/22/20 0821     Scheduled Medications:     aspirin  81 mg Oral Daily     heparin ANTICOAGULANT  5,000 Units Subcutaneous Q8H     insulin aspart  1-10 Units Subcutaneous TID AC     insulin aspart  1-7 Units Subcutaneous At Bedtime     insulin aspart   Subcutaneous TID AC     insulin isophane human  20 Units Subcutaneous Q24H     mycophenolate  1,500 mg Oral BID     nystatin  1,000,000 Units Oral 4x Daily     pantoprazole  40 mg Oral QAM AC     predniSONE  25 mg Oral BID w/meals    Followed by     [START ON 5/28/2020] predniSONE  20 mg Oral BID w/meals     rosuvastatin  10 mg Oral Daily     senna-docusate  1 tablet Oral BID    Or     senna-docusate  2 tablet Oral BID     sodium chloride (PF)  10 mL Intravenous Once      sodium chloride (PF)  10 mL Intracatheter Q7 Days     sodium chloride (PF)  3 mL Intracatheter Q8H     sulfamethoxazole-trimethoprim  1 tablet Oral Daily     tacrolimus  1 mg Oral BID IS     valGANciclovir  900 mg Oral Daily     vancomycin (VANCOCIN) IV  1,500 mg Intravenous Q12H               Review of Systems:      As mentioned in the HPI otherwise negative by reviewing constitutional symptoms, central and peripheral neurological systems, respiratory system, cardiac system, GI system,  system, musculoskeletal, skin, allergy, and lymphatics.                  Physical Exam:   Temp: 97.5  F (36.4  C) Temp src: Oral BP: 136/89 Pulse: 95 Heart Rate: 90 Resp: 16 SpO2: 96 % O2 Device: None (Room air)      Wt Readings from Last 4 Encounters:   05/26/20 89.1 kg (196 lb 6.4 oz)   05/15/20 86.5 kg (190 lb 11.2 oz)   04/29/20 88.9 kg (196 lb)   12/04/19 91.3 kg (201 lb 4.8 oz)     Constitutional: awake, alert, cooperative, no apparent distress and appears at stated age, well nourished.   Neurologic: Patient is moving all extremities without focal deficit, no focal sensory loss.   Skin: no induration, fluctuation or discharge at the surgical site, and no rash   Oral mucosa with no thrush but with geographic tongue.            Data:   No results found for: ACD4    Inflammatory Markers    Recent Labs   Lab Test 07/23/18  0645   CRP 11.0*       Immune Globulin Studies   No lab results found.    Metabolic Studies       Recent Labs   Lab Test 05/26/20  0518 05/25/20  0653 05/24/20  1137 05/24/20  0647 05/23/20  0619 05/23/20  0126  05/22/20  0745  05/19/20  1203  05/12/20  2215    134 138 138 140 140  --  139   < >  --    < > 138   POTASSIUM 4.6 5.2 4.3 4.6 3.8 3.8   < > 3.2*   < >  --    < > 4.6   CHLORIDE 110* 108  --  110* 110* 110*  --  107   < >  --    < > 107   CO2 20 20  --  20 22 23  --  24   < >  --    < > 24   ANIONGAP 8 6  --  8 8 7  --  8   < >  --    < > 7   BUN 41* 35*  --  36* 31* 35*  --  41*   < >  --     < > 46*   CR 1.42* 1.31*  --  1.36* 1.32* 1.42*  --  1.64*   < >  --    < > 2.16*   GFRESTIMATED 54* 60*  --  57* 59* 54*  --  46*   < >  --    < > 33*   GLC 63* 127* 86 91 181* 173*  --  191*   < >  --    < > 174*   A1C  --   --   --   --   --   --   --   --   --   --   --  8.0*   ARLENE 8.2* 8.2*  --  8.3* 8.2* 7.9*  --  8.1*   < >  --    < > 8.6   PHOS 4.1 3.5  --   --  2.9  --    < > 1.9*  --   --    < >  --    MAG 1.9 2.1  --  2.1 2.5* 1.8  --  2.2  --   --    < >  --    LACT  --   --  1.0  --   --   --   --   --   --  1.6   < >  --     < > = values in this interval not displayed.       Hepatic Studies    Recent Labs   Lab Test 05/25/20  0653 05/22/20  0745 05/19/20  0943 05/18/20  1835 05/18/20  0857 05/18/20  0010 05/11/20 03/21/20   BILITOTAL 0.7 0.5 1.0 1.5* 1.0 0.7 0.6 1.1   ALKPHOS 33* 31* 27* 34* 54 51 55 49   ALBUMIN 2.6* 2.9* 3.0* 2.8* 3.4 3.4 4.1 4.4   AST 23 16 98* 112* 25 30 26 33   ALT 47 19 26 30 32 33 25 28   LDH  --   --   --   --   --   --  139 152       Pancreatitis testing    Recent Labs   Lab Test 05/18/20  0857 05/18/20  0010 08/06/18  1025   AMYLASE 56 65  --    TRIG  --   --  246*       Hematology Studies      Recent Labs   Lab Test 05/26/20  0518 05/25/20  0653 05/24/20  1137 05/24/20  0647 05/23/20  0619 05/22/20  0745 05/21/20  1028 05/21/20  0444  05/18/20  0857 05/18/20  0010  07/08/19  0327 07/07/19  0349 07/06/19  0355 07/05/19  1453   WBC 11.3* 10.4  --  9.4 6.7  --  10.6 10.0   < > 6.2 7.5   < > 5.9 6.5 5.9 5.6   ANEU  --   --   --   --   --   --   --   --   --  5.4 4.9  --  3.5 4.0 3.9 3.5   ALYM  --   --   --   --   --   --   --   --   --  0.7* 1.4  --  1.2 1.1 0.9 1.1   MARTHA  --   --   --   --   --   --   --   --   --  0.1 0.8  --  0.7 0.8 0.7 0.8   AEOS  --   --   --   --   --   --   --   --   --  0.0 0.3  --  0.4 0.4 0.2 0.2   HGB 8.1* 8.1* 7.9* 7.6* 7.7* 8.0* 7.7* 7.6*   < > 12.0* 11.1*   < > 13.0* 12.1* 12.2* 12.2*   HCT 27.2* 26.7*  --  25.8* 26.1*  --  24.8* 24.4*   < >  39.2* 36.7*   < > 41.4 38.9* 39.5* 39.1*    267  --  250 216  --  167 166   < > 251 243   < > 206 189 200 199    < > = values in this interval not displayed.       Clotting Studies    Recent Labs   Lab Test 05/18/20  1835 05/18/20  1630 05/18/20  1248 05/18/20  0010   INR 1.54* 2.21* 1.42* 2.19*   PTT 31 31 34 38*       Arterial Blood Gas Testing    Recent Labs   Lab Test 05/24/20  1137 05/21/20  0756 05/21/20  0445 05/20/20  2340  05/19/20  0943 05/19/20  0812 05/19/20  0324 05/19/20  0009  05/18/20  1835   PH  --   --   --   --   --  7.40 7.40 7.39 7.35  --  7.43   PCO2  --   --   --   --   --  27* 33* 38 39  --  33*   PO2  --   --   --   --   --  131* 124* 132* 153*  --  323*   HCO3  --   --   --   --   --  17* 21 23 21  --  22   O2PER 24.0 2L 2L 2L   < > 40 40  40 40  40 40  40   < > 100    < > = values in this interval not displayed.        Urine Studies     Recent Labs   Lab Test 05/18/20  0135 05/12/20  2250 12/04/19  1641 03/27/19  0910 02/20/19  0820   URINEPH 5.0 5.0 5.0 5.0 5.0   NITRITE Negative Negative Negative Negative Negative   LEUKEST Negative Negative Negative Negative Negative   WBCU 7* 3 15* 3 7*       Vancomycin Levels     Recent Labs   Lab Test 05/24/20  1726 05/22/20  1704 05/20/20  1145   VANCOMYCIN 11.9 12.5 15.3       Tobramycin levels     No lab results found.    Gentamicin levels    No lab results found.    Tacrolimus levels    Invalid input(s): TACROLIMUS, TAC, TACR  Transplant Immunosuppression Labs Latest Ref Rng & Units 5/26/2020 5/25/2020 5/24/2020 5/23/2020 5/23/2020   Tacro Level 5.0 - 15.0 ug/L <3.0(L) <3.0(L) <3.0(L) - -   Tacro Level - Not Provided Not Provided Not Provided - -   Creat 0.66 - 1.25 mg/dL 1.42(H) 1.31(H) 1.36(H) 1.32(H) 1.42(H)   BUN 7 - 30 mg/dL 41(H) 35(H) 36(H) 31(H) 35(H)   WBC 4.0 - 11.0 10e9/L 11.3(H) 10.4 9.4 6.7 -   Neutrophil % - - - - -   ANEU 1.6 - 8.3 10e9/L - - - - -       Microbiology:  Blood cx negative to date 5/12/2020  Last check of C  difficile  No results found for: CDBPCT    Virology:  CMV viral loads  No results found for: 49002, 06932, 13812, 64085, CMVQAL  CMV viral loads  No lab results found.    CMV viral loads  No results found for: CMVLOG, 74250, 75752, 85484, 39043    CMV resistance testing  No lab results found.  No results found for: CMVCID, CMVFOS, CMVGAN     No results found for: H6RES    No results found for: EBVDN, EBRES, EBVDN, EBVSP, EBVPC, EBVPCR    CMV Antibody IgG   Date Value Ref Range Status   05/18/2020 0.3 0.0 - 0.8 AI Final     Comment:     Negative  Antibody index (AI) values reflect qualitative changes in antibody   concentration that cannot be directly associated with clinical condition or   disease state.         Toxoplasma Antibody IgG   Date Value Ref Range Status   05/18/2020 <3.0 0.0 - 7.1 IU/mL Final     Comment:     Negative- Absence of detectable Toxoplasma gondii IgG antibodies. A negative   result does not rule out acute infection.  The magnitude of the measured result is not indicative of the amount of   antibody present. The concentrations of anti-Toxoplasma gondii IgG in a given   specimen determined with assays from different manufacturers can vary due to   differences in assay methods and reagent specificity.           Imaging:  CXR 5/21/2020 reviewed by myself                                                          IMPRESSION:  1. Postsurgical changes of cardiac transplantation. Stable positioning  of the support devices.  2. Trace left pleural effusion.      Tavares Rodriguez MD  Pager: [phone]  05/26/2020

## 2020-05-26 NOTE — PROGRESS NOTES
Called to assess picc line, Bedside RN states one port on picc will not flush or draw back.  CXR done, tip at CAJ, no kinks noted.  Picc line should be TPA'd again.  The second port flushes easily.

## 2020-05-27 ENCOUNTER — PRE VISIT (OUTPATIENT)
Dept: UROLOGY | Facility: CLINIC | Age: 60
End: 2020-05-27

## 2020-05-27 ENCOUNTER — APPOINTMENT (OUTPATIENT)
Dept: GENERAL RADIOLOGY | Facility: CLINIC | Age: 60
DRG: 001 | End: 2020-05-27
Attending: PHYSICIAN ASSISTANT
Payer: COMMERCIAL

## 2020-05-27 LAB
ANION GAP SERPL CALCULATED.3IONS-SCNC: 6 MMOL/L (ref 3–14)
BACTERIA SPEC CULT: NO GROWTH
BACTERIA SPEC CULT: NO GROWTH
BUN SERPL-MCNC: 41 MG/DL (ref 7–30)
CALCIUM SERPL-MCNC: 7.9 MG/DL (ref 8.5–10.1)
CHLORIDE SERPL-SCNC: 110 MMOL/L (ref 94–109)
CO2 SERPL-SCNC: 21 MMOL/L (ref 20–32)
CREAT SERPL-MCNC: 1.32 MG/DL (ref 0.66–1.25)
DONOR IDENTIFICATION: NORMAL
DSA COMMENTS: NORMAL
DSA PRESENT: NO
DSA TEST METHOD: NORMAL
ERYTHROCYTE [DISTWIDTH] IN BLOOD BY AUTOMATED COUNT: 17.8 % (ref 10–15)
GFR SERPL CREATININE-BSD FRML MDRD: 59 ML/MIN/{1.73_M2}
GLUCOSE BLDC GLUCOMTR-MCNC: 142 MG/DL (ref 70–99)
GLUCOSE BLDC GLUCOMTR-MCNC: 146 MG/DL (ref 70–99)
GLUCOSE BLDC GLUCOMTR-MCNC: 160 MG/DL (ref 70–99)
GLUCOSE BLDC GLUCOMTR-MCNC: 172 MG/DL (ref 70–99)
GLUCOSE BLDC GLUCOMTR-MCNC: 196 MG/DL (ref 70–99)
GLUCOSE SERPL-MCNC: 168 MG/DL (ref 70–99)
HCT VFR BLD AUTO: 28.5 % (ref 40–53)
HGB BLD-MCNC: 8.3 G/DL (ref 13.3–17.7)
MAGNESIUM SERPL-MCNC: 1.9 MG/DL (ref 1.6–2.3)
MCH RBC QN AUTO: 25.4 PG (ref 26.5–33)
MCHC RBC AUTO-ENTMCNC: 29.1 G/DL (ref 31.5–36.5)
MCV RBC AUTO: 87 FL (ref 78–100)
ORGAN: NORMAL
PLATELET # BLD AUTO: 335 10E9/L (ref 150–450)
POTASSIUM SERPL-SCNC: 5.1 MMOL/L (ref 3.4–5.3)
RBC # BLD AUTO: 3.27 10E12/L (ref 4.4–5.9)
SA1 CELL: NORMAL
SA1 COMMENTS: NORMAL
SA1 HI RISK ABY: NORMAL
SA1 MOD RISK ABY: NORMAL
SA1 TEST METHOD: NORMAL
SA2 CELL: NORMAL
SA2 COMMENTS: NORMAL
SA2 HI RISK ABY UA: NORMAL
SA2 MOD RISK ABY: NORMAL
SA2 TEST METHOD: NORMAL
SODIUM SERPL-SCNC: 137 MMOL/L (ref 133–144)
SPECIMEN SOURCE: NORMAL
SPECIMEN SOURCE: NORMAL
TACROLIMUS BLD-MCNC: <3 UG/L (ref 5–15)
TME LAST DOSE: ABNORMAL H
UNACCEPTABLE ANTIGEN: NORMAL
UNOS CPRA: 0
VANCOMYCIN SERPL-MCNC: 26.6 MG/L
WBC # BLD AUTO: 11.5 10E9/L (ref 4–11)

## 2020-05-27 PROCEDURE — 25000128 H RX IP 250 OP 636: Performed by: PHYSICIAN ASSISTANT

## 2020-05-27 PROCEDURE — 25000131 ZZH RX MED GY IP 250 OP 636 PS 637: Performed by: PHYSICIAN ASSISTANT

## 2020-05-27 PROCEDURE — 21400000 ZZH R&B CCU UMMC

## 2020-05-27 PROCEDURE — 25000132 ZZH RX MED GY IP 250 OP 250 PS 637: Performed by: PHYSICIAN ASSISTANT

## 2020-05-27 PROCEDURE — 71045 X-RAY EXAM CHEST 1 VIEW: CPT

## 2020-05-27 PROCEDURE — 25000132 ZZH RX MED GY IP 250 OP 250 PS 637: Performed by: SURGERY

## 2020-05-27 PROCEDURE — 25000132 ZZH RX MED GY IP 250 OP 250 PS 637: Performed by: STUDENT IN AN ORGANIZED HEALTH CARE EDUCATION/TRAINING PROGRAM

## 2020-05-27 PROCEDURE — 25000132 ZZH RX MED GY IP 250 OP 250 PS 637: Performed by: NURSE PRACTITIONER

## 2020-05-27 PROCEDURE — 00000146 ZZHCL STATISTIC GLUCOSE BY METER IP

## 2020-05-27 PROCEDURE — 25000131 ZZH RX MED GY IP 250 OP 636 PS 637: Performed by: NURSE PRACTITIONER

## 2020-05-27 PROCEDURE — 99232 SBSQ HOSP IP/OBS MODERATE 35: CPT | Performed by: NURSE PRACTITIONER

## 2020-05-27 PROCEDURE — 83735 ASSAY OF MAGNESIUM: CPT | Performed by: PHYSICIAN ASSISTANT

## 2020-05-27 PROCEDURE — 85027 COMPLETE CBC AUTOMATED: CPT | Performed by: PHYSICIAN ASSISTANT

## 2020-05-27 PROCEDURE — 80202 ASSAY OF VANCOMYCIN: CPT | Performed by: THORACIC SURGERY (CARDIOTHORACIC VASCULAR SURGERY)

## 2020-05-27 PROCEDURE — 36592 COLLECT BLOOD FROM PICC: CPT | Performed by: PHYSICIAN ASSISTANT

## 2020-05-27 PROCEDURE — 36415 COLL VENOUS BLD VENIPUNCTURE: CPT | Performed by: THORACIC SURGERY (CARDIOTHORACIC VASCULAR SURGERY)

## 2020-05-27 PROCEDURE — 80048 BASIC METABOLIC PNL TOTAL CA: CPT | Performed by: PHYSICIAN ASSISTANT

## 2020-05-27 PROCEDURE — 25000131 ZZH RX MED GY IP 250 OP 636 PS 637: Performed by: STUDENT IN AN ORGANIZED HEALTH CARE EDUCATION/TRAINING PROGRAM

## 2020-05-27 PROCEDURE — 80197 ASSAY OF TACROLIMUS: CPT | Performed by: NURSE PRACTITIONER

## 2020-05-27 PROCEDURE — 25000132 ZZH RX MED GY IP 250 OP 250 PS 637: Performed by: THORACIC SURGERY (CARDIOTHORACIC VASCULAR SURGERY)

## 2020-05-27 RX ORDER — DOXYCYCLINE 100 MG/1
100 CAPSULE ORAL EVERY 12 HOURS SCHEDULED
Status: DISCONTINUED | OUTPATIENT
Start: 2020-05-27 | End: 2020-05-29 | Stop reason: HOSPADM

## 2020-05-27 RX ADMIN — VALGANCICLOVIR 900 MG: 450 TABLET, FILM COATED ORAL at 08:42

## 2020-05-27 RX ADMIN — INSULIN HUMAN 20 UNITS: 100 INJECTION, SUSPENSION SUBCUTANEOUS at 10:09

## 2020-05-27 RX ADMIN — HEPARIN SODIUM 5000 UNITS: 5000 INJECTION, SOLUTION INTRAVENOUS; SUBCUTANEOUS at 13:59

## 2020-05-27 RX ADMIN — DOXYCYCLINE HYCLATE 100 MG: 100 CAPSULE ORAL at 10:09

## 2020-05-27 RX ADMIN — INSULIN ASPART 8 UNITS: 100 INJECTION, SOLUTION INTRAVENOUS; SUBCUTANEOUS at 13:57

## 2020-05-27 RX ADMIN — SULFAMETHOXAZOLE AND TRIMETHOPRIM 1 TABLET: 400; 80 TABLET ORAL at 08:42

## 2020-05-27 RX ADMIN — MYCOPHENOLATE MOFETIL 1500 MG: 500 TABLET ORAL at 08:42

## 2020-05-27 RX ADMIN — PANTOPRAZOLE SODIUM 40 MG: 40 TABLET, DELAYED RELEASE ORAL at 08:43

## 2020-05-27 RX ADMIN — MYCOPHENOLATE MOFETIL 1500 MG: 500 TABLET ORAL at 19:59

## 2020-05-27 RX ADMIN — NYSTATIN 1000000 UNITS: 100000 SUSPENSION ORAL at 18:21

## 2020-05-27 RX ADMIN — PREDNISONE 25 MG: 5 TABLET ORAL at 18:21

## 2020-05-27 RX ADMIN — INSULIN ASPART 9 UNITS: 100 INJECTION, SOLUTION INTRAVENOUS; SUBCUTANEOUS at 19:22

## 2020-05-27 RX ADMIN — PREDNISONE 25 MG: 5 TABLET ORAL at 08:42

## 2020-05-27 RX ADMIN — MAGNESIUM SULFATE IN WATER 2 G: 40 INJECTION, SOLUTION INTRAVENOUS at 08:46

## 2020-05-27 RX ADMIN — NYSTATIN 1000000 UNITS: 100000 SUSPENSION ORAL at 20:00

## 2020-05-27 RX ADMIN — DOXYCYCLINE HYCLATE 100 MG: 100 CAPSULE ORAL at 19:59

## 2020-05-27 RX ADMIN — HEPARIN SODIUM 5000 UNITS: 5000 INJECTION, SOLUTION INTRAVENOUS; SUBCUTANEOUS at 06:17

## 2020-05-27 RX ADMIN — NYSTATIN 1000000 UNITS: 100000 SUSPENSION ORAL at 12:43

## 2020-05-27 RX ADMIN — TACROLIMUS 1.5 MG: 1 CAPSULE ORAL at 18:21

## 2020-05-27 RX ADMIN — INSULIN ASPART 5 UNITS: 100 INJECTION, SOLUTION INTRAVENOUS; SUBCUTANEOUS at 08:44

## 2020-05-27 RX ADMIN — NYSTATIN 1000000 UNITS: 100000 SUSPENSION ORAL at 08:42

## 2020-05-27 RX ADMIN — ASPIRIN 81 MG: 81 TABLET, COATED ORAL at 08:42

## 2020-05-27 RX ADMIN — ROSUVASTATIN CALCIUM 10 MG: 10 TABLET, FILM COATED ORAL at 08:43

## 2020-05-27 RX ADMIN — TACROLIMUS 1 MG: 1 CAPSULE ORAL at 08:42

## 2020-05-27 RX ADMIN — SENNOSIDES AND DOCUSATE SODIUM 2 TABLET: 8.6; 5 TABLET ORAL at 08:42

## 2020-05-27 ASSESSMENT — ACTIVITIES OF DAILY LIVING (ADL)
ADLS_ACUITY_SCORE: 13

## 2020-05-27 ASSESSMENT — MIFFLIN-ST. JEOR: SCORE: 1628.23

## 2020-05-27 NOTE — PROGRESS NOTES
[facility]   Cardiology II Service / Advanced Heart Failure  Daily Progress Note  Date of Service: 5/27/2020      Patient: Mariusz Sharp  MRN: [id number]  Admission Date: 5/17/2020  Hospital Day # 9    Assessment and Plan: Mariusz Sharp is a 59 year old male with PMH of CAD, LV thrombus, CKD Stage III, PAF, DM Type II, and ICM s/p HM III LVAD as BTT with subsequent OHT 5/18/20.     S/p OHT secondary to ICM. 5/18/20. Echo 5/23/20 with EF 65-70% and normal graft function.   RHC 5/24 with mRA-7, RV-30/6, mPA-24, mPCWP-15, AMRIT CO-5.12, AMRIT CI-2.62, biopsy negative.   Immunosuppression: Simulect 5/18 and 5/22. Tac increased to 1.5 mg po BID last HS, tac level < 3. Goal-10-12. Cellcept 1500 mg po BID, and Prednisone taper.   Serostatus: CMV: D+, R-, EBV: D- R+, Toxo D- R-  Prophylaxis; Nystatin, Valcyte, and Bactrim  - Continue ASA and Crestor.   - Next RHC/boipsy due 6/1.      NSVT. Recurrent bursts NSVT with no hemodynamic compromise.   - Electrolytes optimized.    - RHC/biopsy negative 5/24.      Gram positive bacilli with diphtheroids to LVAD outflow graft.   - Appreciate ID input.   - ID recommended discontinue Vanco and initiate Doxycycline 100 mg po BID through 6/1/20.     Donor positive S Anginosus and Veillonella. Questionable contaminant. Zosyn completed 5/22. Note Staph Anginosus sensitive to Ceftriaxone, Clindamycin, Erythromycin, PCN, and Vanco. Flagyl completed today. Vanco completed 5/27.   - Appreciate ID input.   - Continue Doxycycline 100 mg po BID through 6/1/20.     DM Type II.   - Management per Endocrinology.     Elevated PSA with Renal Nephrolithiasis.   - PSA-8.21. MRI within the next month.    - Follow up with Urology.   ================================================================  Interval History/ROS: He denies fever, chills, chest pain, palpitations, cough, nausea, vomiting, diarrhea, oral ulcerations. He is tolerating oral intake and ambulation.     Last 24 hr care team  "notes reviewed.   ROS:  4 point ROS including Respiratory, CV, GI and , other than that noted in the HPI, is negative.     Medications: Reviewed in EPIC.     Physical Exam:   /85   Pulse 95   Temp 98.5  F (36.9  C) (Oral)   Resp 18   Ht 1.626 m (5' 4\")   Wt 89.2 kg (196 lb 11.2 oz)   SpO2 99%   BMI 33.76 kg/m    GENERAL: Appears alert and oriented times three.   HEENT: Eye symmetrical and free of discharge bilaterally. Mucous membranes moist and without lesions.  NECK: Supple and without lymphadenopathy. JVD 8 cm.   CV: RRR, S1S2 present without murmur, rub, or gallop.   RESPIRATORY: Respirations regular, even, and unlabored. Lungs CTA throughout.   GI: Soft and non distended with normoactive bowel sounds present in all quadrants. No tenderness, rebound, guarding. No organomegaly.   EXTREMITIES: No peripheral edema. 2+ bilateral pedal pulses.   NEUROLOGIC: Alert and orientated x 3. CN II-XII grossly intact. No focal deficits.   MUSCULOSKELETAL: No joint swelling or tenderness.   SKIN: No jaundice. No rashes or lesions.     Data:  CMP  Recent Labs   Lab 05/27/20  0643 05/26/20  0518 05/25/20  0653 05/24/20  1137 05/24/20  0647 05/23/20  0619  05/22/20  2156  05/22/20  0745    138 134 138 138 140   < >  --   --  139   POTASSIUM 5.1 4.6 5.2 4.3 4.6 3.8   < >  --    < > 3.2*   CHLORIDE 110* 110* 108  --  110* 110*   < >  --   --  107   CO2 21 20 20  --  20 22   < >  --   --  24   ANIONGAP 6 8 6  --  8 8   < >  --   --  8   * 63* 127* 86 91 181*   < >  --   --  191*   BUN 41* 41* 35*  --  36* 31*   < >  --   --  41*   CR 1.32* 1.42* 1.31*  --  1.36* 1.32*   < >  --   --  1.64*   GFRESTIMATED 59* 54* 60*  --  57* 59*   < >  --   --  46*   GFRESTBLACK 69 63 69  --  66 69   < >  --   --  53*   ARLENE 7.9* 8.2* 8.2*  --  8.3* 8.2*   < >  --   --  8.1*   MAG 1.9 1.9 2.1  --  2.1 2.5*   < >  --   --  2.2   PHOS  --  4.1 3.5  --   --  2.9  --  1.8*  --  1.9*   PROTTOTAL  --   --  5.3*  --   --   --   -- "   --   --  5.7*   ALBUMIN  --   --  2.6*  --   --   --   --   --   --  2.9*   BILITOTAL  --   --  0.7  --   --   --   --   --   --  0.5   ALKPHOS  --   --  33*  --   --   --   --   --   --  31*   AST  --   --  23  --   --   --   --   --   --  16   ALT  --   --  47  --   --   --   --   --   --  19    < > = values in this interval not displayed.     CBC  Recent Labs   Lab 05/27/20  0643 05/26/20  0518 05/25/20  0653 05/24/20  1137 05/24/20  0647   WBC 11.5* 11.3* 10.4  --  9.4   RBC 3.27* 3.15* 3.11*  --  2.93*   HGB 8.3* 8.1* 8.1* 7.9* 7.6*   HCT 28.5* 27.2* 26.7*  --  25.8*   MCV 87 86 86  --  88   MCH 25.4* 25.7* 26.0*  --  25.9*   MCHC 29.1* 29.8* 30.3*  --  29.5*   RDW 17.8* 17.2* 17.2*  --  17.4*    295 267  --  250     Patient discussed with Dr. Nielson.      Antonia Byrne [facility]  5/27/2020

## 2020-05-27 NOTE — PLAN OF CARE
D: S/P OHT 5/18 c/b donor infection. Hx of CAD s/p ALYSSA, LV throbus, CKD 3, PAF, DM2, and ICM s/p HM3.      I/A: Vital signs stable, afebrile, A&Ox4, SR 80-90s. Denied pain overnight. Pericardial drain to suction (see flowsheets for output), dressing CDI. Pacer wires capped. IV antibiotics as scheduled. Voiding adequate amounts into urinal. Slept between cares. Incisions CDI. Up independently.     P: Will discharge to [facility] when medically ready. Continue to monitor and notify MD with pertinent changes.

## 2020-05-27 NOTE — PROGRESS NOTES
Cardiovascular Surgery Progress Note  5/25/2020         Assessment and Plan:     Mariusz Sharp is a 59 year old male history of CAD (s/p STEMI with ALYSSA to LAD 6/27/18), LV thrombus, CKD Stage III, PAF, DM Type II (on insulin), and ICM with EF 20-25% s/p HeartMate 3 LVAD implant 12/31/18, he presented from home (COVID negative) on 5/17/20 with an offer for a donor organ and had an orthotopic heart transplant with removal of ICD with Dr Willis and Dr Baker on 5/18.  He did well in the ICU and was quickly weaned off pressors and extubated POD #1 to 2 lpm via NC with neuro status intact. Endocrine was consulted for transition off of the insulin gtt. ID was consulted after the donor's sputum and blood had growth. He responded well to gentle diuresis before transferring out of the ICU.      Cardiovascular:   Hx VT, systolic HF, AF, and ICM. S/P LVAD Dec, 2018.     S/p orthotopic heart transplant 5/18/2020  - No arrhythmia until 5/23, having episodes of nonsustained VT, HD stable.  - Decreased Terbutaline from 10 mg to 5 mg and now off (5/24), HR's 80-90's. No recurrent VT   - ASA 81 mg and rosuvastatin   - Cards dosing immunosuppression, Simulect given 5/22.  - First heart biopsy 5/24; 1R and no AMR   - Second heart biopsy/RHC scheduled for 6/1/20  Post-op Ventricular Tachycardia  - Multiple episodes on 5/23 and 5/24 am.   - RHC and Bx on 5/24 for VT.  Echo 5/23 without concerns.    Chest tubes: pericardial drain with 150 ml out. Removed without problems.     TPW: capped 5/22, will remove today      Pulmonary:  - Extubated POD 1 to 2 lpm via NC; Saturating well on RA.   - Supplemental O2 PRN to keep sats > 92%.   - Pulm toilet, IS, activity and deep breathing     Neurology / MSK:  - Neuro intact  - Acute post-operative pain; pain well controlled with acetaminophen, PO oxycodone PRN, IV dilaudid PRN      / Renal:  - Hx stage III renal disease. Creat decreasing 1.32 today, sill stable, adequate UOP.  - last lasix dose  5/22, diuresis per Cardiology.   - weight up 0.1 kg from yesterday, down 2.9 kg from peak of 92 kg 5/20.     GI / FEN:   - Regular diet, continue bowel regimen  - Replace electrolytes as needed, hepatic enzymes WNL.     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  The S anginosus is usually susceptible to cephalosporins and Veillonella to flagyl. After the routine zosyn (ends 5/22), stopped Vancomycin 5/26, switched to Doxycycline 100 mg BID 5/27 till 6/1/2020 as per ID. Was on flagyl 500 mg tid PO until 5/25.   - The S aureus and the H influenza in the sputum of the donor are of no clinical value in this recipient of the donor's heart.    - WBC up slightly at 11.5, remains afebrile, no signs or symptoms of infection.   - Packing Driveline site BID with moistened Nugauze strips      Hematology:   - Acute blood loss anemia and thrombocytopenia  - Hgb 8.3; Plt WNL, no signs or symptoms of active bleeding     Anticoagulation:   - ASA only      Prophylaxis:   - Stress ulcer prophylaxis: Pantoprazole 40 mg daily for 30 days  - DVT prophylaxis: stop subcutaneous heparin, up independently     Disposition:   - Transferred to  on 5/21  - Rehab recommending discharge to Home with outpt rehab   - social service assisting with Disch, has a room at the [facility]    Staff surgeons have been informed of changes through both verbal and written communication.      Damari Ohaar, LEX, CNP  Cardiothoracic Surgery  Pager [phone]          Interval History:     No overnight events.   States pain is well managed on current regimen. Slept ok overnight.  Tolerating diet, is passing flatus, + BM. No nausea or vomiting.  Breathing well without complaints.   Working with therapies and ambulating in halls without  "assistance.   Denies chest pain, palpitations, dizziness, syncopal symptoms, fevers, chills, myalgias, or sternal popping/clicking.  Pericardial chest tube and Pacer wired removed without any complications.          Physical Exam:   Blood pressure 128/85, pulse 95, temperature 98.5  F (36.9  C), temperature source Oral, resp. rate 18, height 1.626 m (5' 4\"), weight 89.2 kg (196 lb 11.2 oz), SpO2 99 %.  Vitals:    05/25/20 0500 05/26/20 0250 05/27/20 0300   Weight: 89.8 kg (197 lb 14.4 oz) 89.1 kg (196 lb 6.4 oz) 89.2 kg (196 lb 11.2 oz)      Weight; weight essentially unchanged, stable   24 hr Fluid status; 24 hour net loss 680 ml, net - 7.2 L   MAPs: 89 - 104     Gen: A&Ox4, NAD, up in the room independently  Neuro: no focal deficits   CV: RRR, normal S1 S2, no murmurs or gallops. No rub noted.    Pulm: CTA, no wheezing or rhonchi, normal breathing on RA  Abd: nondistended, normal BS, soft, nontender  Ext: trace periperal edema, non-pitting  Incision: clean, dry, intact, no erythema, sternum stable  Tubes/drain sites: CT and PW removed.          Data:    Imaging:  reviewed recent imaging, no acute concerns    CXR 5/27/2020: Unchanged from 5/25/20, formal read pending     CXR 5/25/2020: IMPRESSION:  1. Stable positioning of right upper extremity PICC line with tip  terminating near the cavoatrial junction.  2. Stable linear basilar opacities, likely atelectasis.    Labs:  BMP  Recent Labs   Lab 05/27/20  0643 05/26/20  0518 05/25/20  0653 05/24/20  1137 05/24/20  0647    138 134 138 138   POTASSIUM 5.1 4.6 5.2 4.3 4.6   CHLORIDE 110* 110* 108  --  110*   ARLENE 7.9* 8.2* 8.2*  --  8.3*   CO2 21 20 20  --  20   BUN 41* 41* 35*  --  36*   CR 1.32* 1.42* 1.31*  --  1.36*   * 63* 127* 86 91     CBC  Recent Labs   Lab 05/27/20  0643 05/26/20  0518 05/25/20  0653 05/24/20  1137 05/24/20  0647   WBC 11.5* 11.3* 10.4  --  9.4   RBC 3.27* 3.15* 3.11*  --  2.93*   HGB 8.3* 8.1* 8.1* 7.9* 7.6*   HCT 28.5* 27.2* 26.7* "  --  25.8*   MCV 87 86 86  --  88   MCH 25.4* 25.7* 26.0*  --  25.9*   MCHC 29.1* 29.8* 30.3*  --  29.5*   RDW 17.8* 17.2* 17.2*  --  17.4*    295 267  --  250     INR  No lab results found in last 7 days.   Liver Function Studies -   Recent Labs   Lab Test 05/22/20  0745   PROTTOTAL 5.7*   ALBUMIN 2.9*   BILITOTAL 0.5   ALKPHOS 31*   AST 16   ALT 19     GLUCOSE:   Recent Labs   Lab 05/27/20  0718 05/27/20  0643 05/27/20  0302 05/26/20  2134 05/26/20  1720 05/26/20  1139 05/26/20  0803  05/26/20  0518  05/25/20  0653  05/24/20  1137  05/24/20  0647  05/23/20  0619   GLC  --  168*  --   --   --   --   --   --  63*  --  127*  --  86  --  91  --  181*   *  --  172* 195* 149* 127* 113*   < >  --    < >  --    < >  --    < >  --    < >  --     < > = values in this interval not displayed.     MEDICATIONS:   aspirin, 81 mg, Oral, Daily  doxycycline hyclate, 100 mg, Oral, Q12H CHANCE  heparin ANTICOAGULANT, 5,000 Units, Subcutaneous, Q8H  insulin aspart, 1-10 Units, Subcutaneous, TID AC  insulin aspart, 1-7 Units, Subcutaneous, At Bedtime  insulin aspart, , Subcutaneous, TID AC  insulin isophane human, 20 Units, Subcutaneous, Q24H  insulin isophane human, 5 Units, Subcutaneous, Q24H  mycophenolate, 1,500 mg, Oral, BID  nystatin, 1,000,000 Units, Oral, 4x Daily  pantoprazole, 40 mg, Oral, QAM AC  predniSONE, 25 mg, Oral, BID w/meals    Followed by  [START ON 5/28/2020] predniSONE, 20 mg, Oral, BID w/meals  rosuvastatin, 10 mg, Oral, Daily  senna-docusate, 1 tablet, Oral, BID    Or  senna-docusate, 2 tablet, Oral, BID  sodium chloride (PF), 10 mL, Intravenous, Once  sodium chloride (PF), 10 mL, Intracatheter, Q7 Days  sodium chloride (PF), 3 mL, Intracatheter, Q8H  sulfamethoxazole-trimethoprim, 1 tablet, Oral, Daily  tacrolimus, 1 mg, Oral, BID IS  valGANciclovir, 900 mg, Oral, Daily

## 2020-05-27 NOTE — PLAN OF CARE
D: Pt who is now s/p OHT on 5/18. Hx of CAD s/p stents, LV thrombus, CKD 3, pAF, DM type 2, ICM s/p LVAD HM 3 12/2018 as BTT.    I/A: Pt alert and oriented x4. Pt SR/ST with HRs 80-100s. On RA with O2 sats >92%. Denies any pain, lightheadedness or dizziness. Pt appetite good, denies nausea. BG checks done ACHS, sliding scale insulin given 1x, carb coverage given 1x. Last BM today. Pt up independently in room and hallways. Worked with PT this afternoon, tolerated well. Old driveline site dressing changed per POC. Pericardial drain continued to -20 suction, net output this shift 40 ml. Dressing CDI. Misternal and L upper incision BELLA and WNL. Pacer wires capped, dressing CDI. IV vanco given as scheduled.     P: Pt to discharge to [facility] when medically stable. Continue to monitor and assess pt with every encounter. Notify CVTS with any changes or concerns.

## 2020-05-27 NOTE — PHARMACY-VANCOMYCIN DOSING SERVICE
Pharmacy Vancomycin Note  Date of Service May 27, 2020  Patient's  [id number]   59 year old, male    Indication: Postoperative Infection and Surgical Prophylaxis  Goal Trough Level: 15-20 mg/L  Day of Therapy: 10  Current Vancomycin regimen:  1500 mg IV q12h    Current estimated CrCl = Estimated Creatinine Clearance: 62.2 mL/min (A) (based on SCr of 1.32 mg/dL (H)).    Creatinine for last 3 days  2020:  6:53 AM Creatinine 1.31 mg/dL  2020:  5:18 AM Creatinine 1.42 mg/dL  2020:  6:43 AM Creatinine 1.32 mg/dL    Recent Vancomycin Levels (past 3 days)  2020:  5:26 PM Vancomycin Level 11.9 mg/L  2020:  8:37 AM Vancomycin Level 26.6 mg/L (12 hr tr)    Vancomycin IV Administrations (past 72 hours)                   vancomycin 1500 mg in 0.9% NaCl 250 ml intermittent infusion 1,500 mg (mg) 1,500 mg Given 205     1,500 mg Given  0932     1,500 mg Given 200     1,500 mg Given  0948    vancomycin (VANCOCIN) 1,750 mg in sodium chloride 0.9 % 250 mL intermittent infusion (mg) 1,750 mg New Bag 20 181                Nephrotoxins and other renal medications (From now, onward)    Start     Dose/Rate Route Frequency Ordered Stop    20 1800  tacrolimus (GENERIC EQUIVALENT) capsule 1 mg      1 mg Oral 2 TIMES DAILY. 20 1453               Contrast Orders - past 72 hours (72h ago, onward)    None          Interpretation of levels and current regimen:  Trough level is  Supratherapeutic    Has serum creatinine changed > 50% in last 72 hours: No    Urine output:  good urine output    Renal Function: Stable    Plan:  1.  ID recommends discontinuing vanco. Vanco was discontinued before level resulted and 0900 dose was not administered.     Bright Amaral [facility]

## 2020-05-27 NOTE — PROGRESS NOTES
Diabetes Consult Daily  Progress Note          Assessment/Plan:                            Mariusz Moon is a 59 year old male with PMHx TIIDM, HTN, HLP, ischemic heart disease s/p STEMI with ALYSSA 2019, LVAD placement, CKD III and obesity who was admitted 5/17/20 for heart transplant. He underwent heart transplant on 5/18/20 with Dr. Willis. Donor blood cuultures and respiratory cultures returned positive for H flu, staph and strep      Type II Diabetes Mellitus: uncontrolled (A1c 8.0%), with steroid induced hyperglycemia following heart transplant        Plan:  -NPH  20 units with Prednisone 25 mg ( 0830)  -NPH 5 units with Prednisone 25 mg ( 1800)  -Novolog 1 unit per 7 grams of CHO for meals/snacks/supplements  -Novolog high intensity sliding scale before meals and HS  -monitor glucose before meals, HS and 0200  -hypoglycemia protocol  -will continue to follow       Outpatient diabetes follow up: TBD  Plan discussed with patient      Discharge: plan to [facility] when medically stable             Interval History:   The last 24 hours progress and nursing notes reviewed.    Blood sugars are in pretty good control on current regime  Had initially thought to order additional NPH with evening prednisone, but did not  Glcusoe 195--> 172--> 168 and 146 in am  Appetite is reported as good, denies nausea and or vomiting    Steroid taper:  Prednisone 25 mg twice dialy starting 5/26 x 4 doses  Prednisone 20 mg twice daily ( starting 5/28 at 0800)       Recent Labs   Lab 05/27/20  0718 05/27/20  0643 05/27/20  0302 05/26/20  2134 05/26/20  1720 05/26/20  1139 05/26/20  0803  05/26/20  0518  05/25/20  0653  05/24/20  1137  05/24/20  0647  05/23/20  0619   GLC  --  168*  --   --   --   --   --   --  63*  --  127*  --  86  --  91  --  181*   *  --  172* 195* 149* 127* 113*   < >  --    < >  --    < >  --    < >  --    < >  --     < > = values in this interval not displayed.                "Review of Systems:   See interval hx          Medications:     Orders Placed This Encounter      Regular Diet Adult       Physical Exam:  Gen: Alert, sitting in a chair,NAD   HEENT:  mucous membranes are moist  Resp: non-labored  Ext: moving all extremiteis  Neuro:oriented x3, communicating clearly  /85   Pulse 95   Temp 98.5  F (36.9  C) (Oral)   Resp 18   Ht 1.626 m (5' 4\")   Wt 89.2 kg (196 lb 11.2 oz)   SpO2 99%   BMI 33.76 kg/m             Data:     Lab Results   Component Value Date    A1C 8.0 05/12/2020    A1C 9.5 07/05/2019    A1C 9.4 01/11/2019    A1C 10.1 12/30/2018    A1C 10.6 12/20/2018              CBC RESULTS:   Recent Labs   Lab Test 05/27/20  0643   WBC 11.5*   RBC 3.27*   HGB 8.3*   HCT 28.5*   MCV 87   MCH 25.4*   MCHC 29.1*   RDW 17.8*        Recent Labs   Lab Test 05/27/20  0643 05/26/20  0518    138   POTASSIUM 5.1 4.6   CHLORIDE 110* 110*   CO2 21 20   ANIONGAP 6 8   * 63*   BUN 41* 41*   CR 1.32* 1.42*   ARLENE 7.9* 8.2*     Liver Function Studies -   Recent Labs   Lab Test 05/25/20  0653   PROTTOTAL 5.3*   ALBUMIN 2.6*   BILITOTAL 0.7   ALKPHOS 33*   AST 23   ALT 47     Lab Results   Component Value Date    INR 1.54 05/18/2020    INR 2.21 05/18/2020    INR 1.42 05/18/2020    INR 2.19 05/18/2020    INR 2.21 05/15/2020    INR 2.76 05/14/2020    INR 2.88 05/13/2020    INR 2.47 05/12/2020    INR 2.5 05/11/2020    INR 2.4 04/24/2020    INR 2.3 04/03/2020    INR 2.5 03/20/2020         I spent a total of 25 minutes bedside and on the inpatient unit managing the glycemic care of Mariusz Sharp. Over 50% of my time on the unit was spent counseling the patient  and/or coordinating care regarding .  See note for details.      Shelley Vargas -[phone]  Diabetes Management job code [id number]            "

## 2020-05-27 NOTE — TELEPHONE ENCOUNTER
Chief Complaint : Phone apt    Hx/Sx:Stone CT Review     Records/Orders: CT Requested 05/27/20    Pt Contacted: n/a    At Rooming: Check In

## 2020-05-28 ENCOUNTER — APPOINTMENT (OUTPATIENT)
Dept: MRI IMAGING | Facility: CLINIC | Age: 60
DRG: 001 | End: 2020-05-28
Attending: NURSE PRACTITIONER
Payer: COMMERCIAL

## 2020-05-28 ENCOUNTER — APPOINTMENT (OUTPATIENT)
Dept: OCCUPATIONAL THERAPY | Facility: CLINIC | Age: 60
DRG: 001 | End: 2020-05-28
Attending: THORACIC SURGERY (CARDIOTHORACIC VASCULAR SURGERY)
Payer: COMMERCIAL

## 2020-05-28 ENCOUNTER — APPOINTMENT (OUTPATIENT)
Dept: GENERAL RADIOLOGY | Facility: CLINIC | Age: 60
DRG: 001 | End: 2020-05-28
Attending: NURSE PRACTITIONER
Payer: COMMERCIAL

## 2020-05-28 ENCOUNTER — TELEPHONE (OUTPATIENT)
Dept: TRANSPLANT | Facility: CLINIC | Age: 60
End: 2020-05-28

## 2020-05-28 LAB
ANION GAP SERPL CALCULATED.3IONS-SCNC: 9 MMOL/L (ref 3–14)
BUN SERPL-MCNC: 45 MG/DL (ref 7–30)
CALCIUM SERPL-MCNC: 8.2 MG/DL (ref 8.5–10.1)
CHLORIDE SERPL-SCNC: 108 MMOL/L (ref 94–109)
CO2 SERPL-SCNC: 17 MMOL/L (ref 20–32)
CREAT SERPL-MCNC: 1.49 MG/DL (ref 0.66–1.25)
CREAT SERPL-MCNC: 1.51 MG/DL (ref 0.66–1.25)
ERYTHROCYTE [DISTWIDTH] IN BLOOD BY AUTOMATED COUNT: 17.7 % (ref 10–15)
GFR SERPL CREATININE-BSD FRML MDRD: 50 ML/MIN/{1.73_M2}
GFR SERPL CREATININE-BSD FRML MDRD: 51 ML/MIN/{1.73_M2}
GLUCOSE BLDC GLUCOMTR-MCNC: 188 MG/DL (ref 70–99)
GLUCOSE BLDC GLUCOMTR-MCNC: 189 MG/DL (ref 70–99)
GLUCOSE BLDC GLUCOMTR-MCNC: 196 MG/DL (ref 70–99)
GLUCOSE BLDC GLUCOMTR-MCNC: 204 MG/DL (ref 70–99)
GLUCOSE BLDC GLUCOMTR-MCNC: 229 MG/DL (ref 70–99)
GLUCOSE SERPL-MCNC: 218 MG/DL (ref 70–99)
HCT VFR BLD AUTO: 29.5 % (ref 40–53)
HGB BLD-MCNC: 8.9 G/DL (ref 13.3–17.7)
MCH RBC QN AUTO: 26.6 PG (ref 26.5–33)
MCHC RBC AUTO-ENTMCNC: 30.2 G/DL (ref 31.5–36.5)
MCV RBC AUTO: 88 FL (ref 78–100)
PLATELET # BLD AUTO: 365 10E9/L (ref 150–450)
POTASSIUM SERPL-SCNC: 5.4 MMOL/L (ref 3.4–5.3)
POTASSIUM SERPL-SCNC: 5.5 MMOL/L (ref 3.4–5.3)
POTASSIUM SERPL-SCNC: 5.6 MMOL/L (ref 3.4–5.3)
POTASSIUM SERPL-SCNC: 5.9 MMOL/L (ref 3.4–5.3)
RBC # BLD AUTO: 3.35 10E12/L (ref 4.4–5.9)
SODIUM SERPL-SCNC: 135 MMOL/L (ref 133–144)
TACROLIMUS BLD-MCNC: <3 UG/L (ref 5–15)
TME LAST DOSE: ABNORMAL H
WBC # BLD AUTO: 11.9 10E9/L (ref 4–11)

## 2020-05-28 PROCEDURE — 36415 COLL VENOUS BLD VENIPUNCTURE: CPT | Performed by: PHYSICIAN ASSISTANT

## 2020-05-28 PROCEDURE — 25000132 ZZH RX MED GY IP 250 OP 250 PS 637: Performed by: INTERNAL MEDICINE

## 2020-05-28 PROCEDURE — 25000132 ZZH RX MED GY IP 250 OP 250 PS 637: Performed by: NURSE PRACTITIONER

## 2020-05-28 PROCEDURE — 97110 THERAPEUTIC EXERCISES: CPT | Mod: GO

## 2020-05-28 PROCEDURE — 25000131 ZZH RX MED GY IP 250 OP 636 PS 637: Performed by: INTERNAL MEDICINE

## 2020-05-28 PROCEDURE — 00000146 ZZHCL STATISTIC GLUCOSE BY METER IP

## 2020-05-28 PROCEDURE — 25000132 ZZH RX MED GY IP 250 OP 250 PS 637: Performed by: PHYSICIAN ASSISTANT

## 2020-05-28 PROCEDURE — 36415 COLL VENOUS BLD VENIPUNCTURE: CPT | Performed by: NURSE PRACTITIONER

## 2020-05-28 PROCEDURE — 97535 SELF CARE MNGMENT TRAINING: CPT | Mod: GO

## 2020-05-28 PROCEDURE — 84132 ASSAY OF SERUM POTASSIUM: CPT | Performed by: NURSE PRACTITIONER

## 2020-05-28 PROCEDURE — 21400000 ZZH R&B CCU UMMC

## 2020-05-28 PROCEDURE — 25000131 ZZH RX MED GY IP 250 OP 636 PS 637: Performed by: PHYSICIAN ASSISTANT

## 2020-05-28 PROCEDURE — 25000132 ZZH RX MED GY IP 250 OP 250 PS 637: Performed by: THORACIC SURGERY (CARDIOTHORACIC VASCULAR SURGERY)

## 2020-05-28 PROCEDURE — 25000132 ZZH RX MED GY IP 250 OP 250 PS 637: Performed by: SURGERY

## 2020-05-28 PROCEDURE — 80048 BASIC METABOLIC PNL TOTAL CA: CPT | Performed by: PHYSICIAN ASSISTANT

## 2020-05-28 PROCEDURE — 36592 COLLECT BLOOD FROM PICC: CPT | Performed by: NURSE PRACTITIONER

## 2020-05-28 PROCEDURE — 25000132 ZZH RX MED GY IP 250 OP 250 PS 637: Performed by: STUDENT IN AN ORGANIZED HEALTH CARE EDUCATION/TRAINING PROGRAM

## 2020-05-28 PROCEDURE — 25500064 ZZH RX 255 OP 636: Performed by: THORACIC SURGERY (CARDIOTHORACIC VASCULAR SURGERY)

## 2020-05-28 PROCEDURE — 72197 MRI PELVIS W/O & W/DYE: CPT

## 2020-05-28 PROCEDURE — A9585 GADOBUTROL INJECTION: HCPCS | Performed by: THORACIC SURGERY (CARDIOTHORACIC VASCULAR SURGERY)

## 2020-05-28 PROCEDURE — 93010 ELECTROCARDIOGRAM REPORT: CPT | Performed by: INTERNAL MEDICINE

## 2020-05-28 PROCEDURE — 85027 COMPLETE CBC AUTOMATED: CPT | Performed by: PHYSICIAN ASSISTANT

## 2020-05-28 PROCEDURE — 93005 ELECTROCARDIOGRAM TRACING: CPT

## 2020-05-28 PROCEDURE — 71046 X-RAY EXAM CHEST 2 VIEWS: CPT

## 2020-05-28 PROCEDURE — 25000131 ZZH RX MED GY IP 250 OP 636 PS 637: Performed by: NURSE PRACTITIONER

## 2020-05-28 PROCEDURE — 80197 ASSAY OF TACROLIMUS: CPT | Performed by: NURSE PRACTITIONER

## 2020-05-28 PROCEDURE — 82565 ASSAY OF CREATININE: CPT | Performed by: NURSE PRACTITIONER

## 2020-05-28 RX ORDER — GADOBUTROL 604.72 MG/ML
9 INJECTION INTRAVENOUS ONCE
Status: COMPLETED | OUTPATIENT
Start: 2020-05-28 | End: 2020-05-28

## 2020-05-28 RX ORDER — SODIUM POLYSTYRENE SULFONATE 15 G/60ML
15 SUSPENSION ORAL; RECTAL ONCE
Status: COMPLETED | OUTPATIENT
Start: 2020-05-28 | End: 2020-05-28

## 2020-05-28 RX ORDER — TACROLIMUS 1 MG/1
2 CAPSULE ORAL
Status: DISCONTINUED | OUTPATIENT
Start: 2020-05-28 | End: 2020-05-29 | Stop reason: HOSPADM

## 2020-05-28 RX ADMIN — NYSTATIN 1000000 UNITS: 100000 SUSPENSION ORAL at 08:35

## 2020-05-28 RX ADMIN — TACROLIMUS 1.5 MG: 1 CAPSULE ORAL at 08:35

## 2020-05-28 RX ADMIN — SODIUM POLYSTYRENE SULFONATE 15 G: 15 SUSPENSION ORAL; RECTAL at 19:47

## 2020-05-28 RX ADMIN — ASPIRIN 81 MG: 81 TABLET, COATED ORAL at 08:35

## 2020-05-28 RX ADMIN — INSULIN ASPART 9 UNITS: 100 INJECTION, SOLUTION INTRAVENOUS; SUBCUTANEOUS at 09:38

## 2020-05-28 RX ADMIN — DOXYCYCLINE HYCLATE 100 MG: 100 CAPSULE ORAL at 21:03

## 2020-05-28 RX ADMIN — PREDNISONE 20 MG: 20 TABLET ORAL at 17:36

## 2020-05-28 RX ADMIN — PREDNISONE 20 MG: 20 TABLET ORAL at 08:35

## 2020-05-28 RX ADMIN — MYCOPHENOLATE MOFETIL 1500 MG: 500 TABLET ORAL at 08:35

## 2020-05-28 RX ADMIN — NYSTATIN 1000000 UNITS: 100000 SUSPENSION ORAL at 12:09

## 2020-05-28 RX ADMIN — INSULIN GLARGINE 35 UNITS: 100 INJECTION, SOLUTION SUBCUTANEOUS at 14:31

## 2020-05-28 RX ADMIN — INSULIN HUMAN 20 UNITS: 100 INJECTION, SUSPENSION SUBCUTANEOUS at 08:34

## 2020-05-28 RX ADMIN — ROSUVASTATIN CALCIUM 10 MG: 10 TABLET, FILM COATED ORAL at 08:35

## 2020-05-28 RX ADMIN — INSULIN ASPART 9 UNITS: 100 INJECTION, SOLUTION INTRAVENOUS; SUBCUTANEOUS at 13:14

## 2020-05-28 RX ADMIN — TACROLIMUS 2 MG: 1 CAPSULE ORAL at 17:36

## 2020-05-28 RX ADMIN — Medication 1 TABLET: at 17:36

## 2020-05-28 RX ADMIN — GADOBUTROL 9 ML: 604.72 INJECTION INTRAVENOUS at 16:33

## 2020-05-28 RX ADMIN — INSULIN ASPART 14 UNITS: 100 INJECTION, SOLUTION INTRAVENOUS; SUBCUTANEOUS at 19:00

## 2020-05-28 RX ADMIN — DOXYCYCLINE HYCLATE 100 MG: 100 CAPSULE ORAL at 08:35

## 2020-05-28 RX ADMIN — PANTOPRAZOLE SODIUM 40 MG: 40 TABLET, DELAYED RELEASE ORAL at 08:35

## 2020-05-28 RX ADMIN — VALGANCICLOVIR 900 MG: 450 TABLET, FILM COATED ORAL at 08:35

## 2020-05-28 RX ADMIN — NYSTATIN 1000000 UNITS: 100000 SUSPENSION ORAL at 21:03

## 2020-05-28 RX ADMIN — NYSTATIN 1000000 UNITS: 100000 SUSPENSION ORAL at 17:35

## 2020-05-28 RX ADMIN — MYCOPHENOLATE MOFETIL 1500 MG: 500 TABLET ORAL at 21:03

## 2020-05-28 ASSESSMENT — ACTIVITIES OF DAILY LIVING (ADL)
ADLS_ACUITY_SCORE: 13

## 2020-05-28 ASSESSMENT — MIFFLIN-ST. JEOR
SCORE: 1619.61
SCORE: 1631.4

## 2020-05-28 NOTE — PLAN OF CARE
Discharge Planner OT   Patient plan for discharge: Pt would like to discharge home.   Current status: Pt seated upright in bedside chair. Therapist educated pt on and reviewed sternal precautions and safety with functional transfers/ADLs. Pt completed transfer sit<->standing and ambulation with SBA, and vc's. Pt ambulated ~200ft x 2 and completed 25 minutes of Nustep exercise with fair tolerance. Pt tolerated therapy session well overall. VSS.   Barriers to return to prior living situation: Fatigue, Current medical status.   Recommendations for discharge: Home with A and OP CR.   Rationale for recommendations: Pt will benefit from continued therapy to increase strength/endurance for ADLs/IADLs.        Entered by: Papito Mosley 05/28/2020 2:45 PM

## 2020-05-28 NOTE — PROGRESS NOTES
Cardiovascular Surgery Progress Note  5/25/2020         Assessment and Plan:     Mariusz Sharp is a 59 year old male history of CAD (s/p STEMI with ALYSSA to LAD 6/27/18), LV thrombus, CKD Stage III, PAF, DM Type II (on insulin), and ICM with EF 20-25% s/p HeartMate 3 LVAD implant 12/31/18, he presented from home (COVID negative) on 5/17/20 with an offer for a donor organ and had an orthotopic heart transplant with removal of ICD with Dr Willis and Dr Baker on 5/18.  He did well in the ICU and was quickly weaned off pressors and extubated POD #1 to 2 lpm via NC with neuro status intact. Endocrine was consulted for transition off of the insulin gtt. ID was consulted after the donor's sputum and blood had growth. He responded well to gentle diuresis before transferring out of the ICU.      Cardiovascular:   Hx VT, systolic HF, AF, and ICM. S/P LVAD Dec, 2018.     S/p orthotopic heart transplant 5/18/2020  - No arrhythmia until 5/23, had episodes of nonsustained VT, HD stable.  - Terbutaline decreased from 10 mg to 5 mg and now off (5/24), HR's 80-90's. No recurrent VT   - ASA 81 mg and rosuvastatin   - Cards dosing immunosuppression, Simulect given 5/22. Tacrolimus started 5/23.  - First heart biopsy 5/24; 1R and no AMR   - Second heart biopsy/RHC scheduled for 6/1/20  - Potassium and creat elevated today, discussed with cards, holding Bactrim this am due risk of elevated K+. Recheck K+ and Creat at noon. EKG done without any acute changes. Hold off on discharge today.   Post-op Ventricular Tachycardia  - Multiple episodes on 5/23 and 5/24 am.   - RHC and Bx on 5/24 for VT.  Echo 5/23 without concerns.    Chest tubes: Removed 5/27  TPW: removed 5/27     Pulmonary:  - Extubated POD #1 to 2 lpm via NC; Saturating well on RA.   - Supplemental O2 PRN to keep sats > 92%.   - Pulm toilet, IS, activity and deep breathing     Neurology / MSK:  - Neuro intact  - Acute post-operative pain; pain well controlled with  acetaminophen, PO oxycodone PRN, IV dilaudid PRN      / Renal:  - Hx stage III renal disease. Creat bumped today from 1.32 to 1.49, K+ up to 5.9, recheck 5.5. Will recheck K+ and Creat at noon.   - last lasix dose 5/22, diuresis per Cardiology.   - weight up another 0.3 kg from yesterday, still down 2.7 kg from peak of 92 kg 5/20.   - Elevated PSA from 3.83 3/27 to 8.51 5/15, discussed with cards, will do prostate MRI as baseline and follow PSA as outpatient      GI / FEN:   - Regular diet, continue bowel regimen  - Hepatic enzymes WNL.     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  The S anginosus is usually susceptible to cephalosporins and Veillonella to flagyl. After the routine zosyn (ends 5/22), stopped Vancomycin 5/26, switched to Doxycycline 100 mg BID 5/27 till 6/1/2020 as per ID. Was on flagyl 500 mg tid PO until 5/25.   - The S aureus and the H influenza in the sputum of the donor are of no clinical value in this recipient of the donor's heart.    - WBC up slightly at 11.5, remains afebrile, no signs or symptoms of infection.   - Packing Driveline site BID with moistened Nugauze strips      Hematology:   - Acute blood loss anemia and thrombocytopenia  - Hgb rising 8.9; Plt WNL, no signs or symptoms of active bleeding     Anticoagulation:   - ASA only      Prophylaxis:   - Stress ulcer prophylaxis: Pantoprazole 40 mg daily for 30 days  - DVT prophylaxis: stop subcutaneous heparin, up independently     Disposition:   - Transferred to  on 5/21  - Rehab recommending discharge to Home with outpt rehab   - social service assisting with Disch, has a room at the [facility]    Staff surgeons have been informed of changes through both verbal and written communication.      Damari Ohara,  "LEX, CNP  Cardiothoracic Surgery  Pager [phone]          Interval History:     No overnight events.   Pain continues to be well managed on current regimen. Slept ok overnight.  Tolerating diet, is passing flatus, + BM. No nausea or vomiting.  Breathing well without complaints.   Working with therapies and ambulating in halls without assistance.   Denies chest pain, palpitations, dizziness, syncopal symptoms, fevers, chills, myalgias, or sternal popping/clicking.  Pericardial chest tube and Pacer wires removed 5/27, dressings WDI.           Physical Exam:   Blood pressure 115/80, pulse 94, temperature 97.7  F (36.5  C), temperature source Oral, resp. rate 18, height 1.626 m (5' 4\"), weight 89.5 kg (197 lb 6.4 oz), SpO2 97 %.  Vitals:    05/26/20 0250 05/27/20 0300 05/28/20 0338   Weight: 89.1 kg (196 lb 6.4 oz) 89.2 kg (196 lb 11.2 oz) 89.5 kg (197 lb 6.4 oz)      Weight; Re-weighed this morning due to discrepancy in what was recorded and patient's recalling of his weight this am. He was 194 lb 6 oz, trending down.    24 hr Fluid status; 24 hour net loss 750 ml, net - 7.9 L     MAPs: 89 - 101     Gen: A&Ox4, NAD, up in the room independently  Neuro: no focal deficits   CV: RRR, normal S1 S2, no murmurs or gallops. No rub noted.    Pulm: CTA, no wheezing or rhonchi, normal breathing on RA  Abd: nondistended, normal BS, soft, nontender  Ext: trace periperal edema, non-pitting  Incision: clean, dry, intact, no erythema, sternum stable  Tubes/drain sites: CT and PW removed, dressing WDI.   Rt lower abd wound dressing WDI.           Data:    Imaging:  reviewed recent imaging, no acute concerns    CXR 5/28/2020: IMPRESSION:  1. Postsurgical changes of cardiac transplantation. Interval removal  of mediastinal drain.  2. Decreased left basilar opacities, likely resolving atelectasis.    CXR 5/27/2020: IMPRESSION:  1. Left basilar streaky opacities are unchanged and likely represent  atelectasis.  2. Postsurgical changes " of cardiac transplantation. Stable positioning  of the support devices.    CXR 5/25/2020: IMPRESSION:  1. Stable positioning of right upper extremity PICC line with tip  terminating near the cavoatrial junction.  2. Stable linear basilar opacities, likely atelectasis.    Labs:  BMP  Recent Labs   Lab 05/28/20  0547 05/27/20  0643 05/26/20  0518 05/25/20  0653    137 138 134   POTASSIUM 5.9* 5.1 4.6 5.2   CHLORIDE 108 110* 110* 108   ARLENE 8.2* 7.9* 8.2* 8.2*   CO2 17* 21 20 20   BUN 45* 41* 41* 35*   CR 1.49* 1.32* 1.42* 1.31*   * 168* 63* 127*     CBC  Recent Labs   Lab 05/28/20  0547 05/27/20  0643 05/26/20  0518 05/25/20  0653   WBC 11.9* 11.5* 11.3* 10.4   RBC 3.35* 3.27* 3.15* 3.11*   HGB 8.9* 8.3* 8.1* 8.1*   HCT 29.5* 28.5* 27.2* 26.7*   MCV 88 87 86 86   MCH 26.6 25.4* 25.7* 26.0*   MCHC 30.2* 29.1* 29.8* 30.3*   RDW 17.7* 17.8* 17.2* 17.2*    335 295 267     INR  No lab results found in last 7 days.   Liver Function Studies -   Recent Labs   Lab Test 05/22/20  0745   PROTTOTAL 5.7*   ALBUMIN 2.9*   BILITOTAL 0.5   ALKPHOS 31*   AST 16   ALT 19     GLUCOSE:   Recent Labs   Lab 05/28/20  0741 05/28/20  0547 05/28/20  0344 05/27/20  2149 05/27/20  1808 05/27/20  1236 05/27/20  0718 05/27/20  0643  05/26/20  0518  05/25/20  0653  05/24/20  1137  05/24/20  0647   GLC  --  218*  --   --   --   --   --  168*  --  63*  --  127*  --  86  --  91   *  --  188* 196* 160* 142* 146*  --    < >  --    < >  --    < >  --    < >  --     < > = values in this interval not displayed.     MEDICATIONS:   aspirin, 81 mg, Oral, Daily  doxycycline hyclate, 100 mg, Oral, Q12H CHANCE  insulin aspart, 1-10 Units, Subcutaneous, TID AC  insulin aspart, 1-7 Units, Subcutaneous, At Bedtime  insulin aspart, , Subcutaneous, TID AC  insulin isophane human, 20 Units, Subcutaneous, Q24H  insulin isophane human, 5 Units, Subcutaneous, Q24H  mycophenolate, 1,500 mg, Oral, BID  nystatin, 1,000,000 Units, Oral, 4x  Daily  pantoprazole, 40 mg, Oral, QAM AC  predniSONE, 20 mg, Oral, BID w/meals  rosuvastatin, 10 mg, Oral, Daily  senna-docusate, 1 tablet, Oral, BID  sodium chloride (PF), 10 mL, Intravenous, Once  tacrolimus, 1.5 mg, Oral, BID IS  valGANciclovir, 900 mg, Oral, Daily

## 2020-05-28 NOTE — PROGRESS NOTES
"CLINICAL NUTRITION SERVICES - ASSESSMENT NOTE     Nutrition Prescription    RECOMMENDATIONS FOR MDs/PROVIDERS TO ORDER:  None at this time.     Malnutrition Status:    Unable to determine due to unable to complete NFPA to limit exposure and to minimize use of PPE during COVID-19 pandemic      Recommendations already ordered by Registered Dietitian (RD):  Calcium/vitamin D twice daily    Prn snack/supplement order    Future/Additional Recommendations:  1. Monitor need to adjust diet to a 3 g K+ diet order. Continue to encourage adequate protein/kcals as pt with increased needs post-op Tx, acutely.   2. Order a multivitamin with minerals, if oral intake decreases, to help meet nutrition needs.   3. Monitor BG control. Hgb A1c of 9.5 on 7/5/19, 8 on 5/12/20. BG was 196 on 5/28. DM II hx. Endo is following.      REASON FOR ASSESSMENT  Mariusz Sharp is a/an 59 year old male assessed by the dietitian for LOS, post-op heart Tx    NUTRITION/ADDITIONAL HISTORY  Per RD note 1/8/19, \"High Consistent Carbohydrate since 1/5. Previously on a 2 g sodium diet 12/29, NPO 12/31, extubated 1/2, regular diet 1/3, and then low consistent carbohydrate diet 1/3.   Intake: Good diet tolerance. Flowsheets indicate pt consuming 100% of meals with a fair appetite 1/3, 0-75% of meals with a fair appetite 1/4, 100% of meals 1/5, 50-75% of meals 1/6, and 100% of meals with a good appetite 1/7-1/8. Decreased appetite initially post-op. Per pt, his appetite had improved until yesterday when he had \"heart pain.\" States this resolved today (1/8) and he was able to eat lunch. He did have nausea a couple times but this has resolved. Per pt, the nausea may have been due to miralax. States he does have some early satiety. Provided pt with an oral supplement menu. Scheduled a snack at HS, string cheese with pineapple (or grapes).\"   Per H & P, \"Significant PMHx of CAD, HTN, h/o MI s/p stent 2019 with ALYSSA with ICM with LVAD HeartMate III implant, ( EF " "20-25%) taking blood thinners, chronic anemia, CKD, D2M on insulin s/p heart transplant, LVAD removal and ICD placement on 5/18/2020 with Dr. Willis.\" Pt was ordered to take, noting, pepcid, novolog, and Lantus PTA.  Unable to obtain nutrition hx. Pt's phone was busy during attempts x2 to call him. Per RD note 5/21, \"Pt reports good appetite. Has received low sodium diet education in the past, but does not always follow.\"    CURRENT NUTRITION ORDERS  Diet: Regular. Pt was NPO 5/18 for heart Tx, extubated 5/19 and was advanced to a regular diet. Has been on a regular diet since at least 5/21.   Intake/Tolerance: Good diet tolerance. Per nursing flowsheets (% intake), pt consuming 50% with a fair appetite 5/20, % with a good appetite 5/21, and 100% with a good appetite 5/22-5/28. vWise (meal ordering system) indicates food/beverages sent 5/25-5/27 totaled 2563 kcals and 127 g protein daily on average. Pt is consistently ordering three meals daily.     LABS  Labs reviewed    MEDICATIONS  Medications reviewed    ANTHROPOMETRICS  Height: 162.6 cm (5' 4\")  Most Recent Weight: 88.4 kg (194 lb 12.8 oz)(pt was weighed @0338 am. wt was entered wrong)    IBW: 59.1 kg   BMI: With Obesity Grade I BMI 30-34.9  Weight History: 76.6 kg (12/30, admit), 80.4 kg (1/6/19), 86.2 kg (5/3/19), 94.2 kg (9/4/19), 91.3 kg (12/4/19), 88.9 kg (4/29/20), 87.4 kg (5/18, admit) - Wt may vary, pending changes in fluid status. Pt has lost 4.3% of his body wt over the last five to six months. No significant or severe wt loss PTA.   Dosing Weight: 66 kg (adjusted based on lowest wt so far this admission of 87.4 kg on 5/18/20)    ASSESSED NUTRITION NEEDS  Estimated Energy Needs: 5278-9924 kcals/day (30 - 35 kcals/kg)  Justification:  Increased needs for acute post-Tx phase. Rec the low end of this range with wt status.  Estimated Protein Needs: 86-99 grams protein/day (1.3 - 1.5 grams of pro/kg)  Justification:  Increased needs for acute " post-Tx phase  Estimated Fluid Needs: 7942-1476 mL/day (25-30 mL/kg)   Justification: Maintenance needs, or per provider pending fluid status    PHYSICAL FINDINGS/OTHER FINDINGS  See malnutrition section below.  GI: Pt having soft, brown stools. Having zero to three stools daily. Last stool on 5/28    MALNUTRITION  % Intake: Not meeting this criteria.     % Weight Loss: Not meeting this criteria.     Subcutaneous Fat Loss: Unable to determine due to unable to complete NFPA to limit exposure and to minimize use of PPE during COVID-19 pandemic    Muscle Loss: Unable to determine due to unable to complete NFPA to limit exposure and to minimize use of PPE during COVID-19 pandemic    Fluid Accumulation/Edema: None to trace, per chart.  Malnutrition Diagnosis: Unable to determine due to unable to complete NFPA to limit exposure and to minimize use of PPE during COVID-19 pandemic      NUTRITION DIAGNOSIS  Increased nutrient needs (protein-energy) related to increased needs post-op Tx as evidenced by pt requiring 1749-4355 kcals/day (30 - 35 kcals/kg) and 86-99 grams protein/day (1.3 - 1.5 grams of pro/kg)     INTERVENTIONS  Implementation  Nutrition Education: See nutrition note earlier this admission. Pt received nutrition education and handout on post-Tx diet guidelines (handout and Tx booklet includes education if a low K+ diet is needed).  Medical food supplement therapy: Entered a prn snack/supplement order. If pt asks for a snack or supplement, please send. Rec lower potassium oral supplements such as Gelatein 20 and Boost Glucose Control with meals.  Collaboration with other providers: Notified team and ordered calcium/vitamin D twice daily (pt on prednisone)    Goals  Patient to consume % of nutritionally adequate meal trays TID, or the equivalent with supplements/snacks.     Monitoring/Evaluation  Progress toward goals will be monitored and evaluated per protocol.        Nutrition will continue to follow.       Dorcas Meek, MS, RD, LD, [facility]   6C Pgr: [phone]

## 2020-05-28 NOTE — PLAN OF CARE
[id number]:  Pt admitted 5/17 s/p OHT on 5/18  Hx: CAD s/p STEMI s/p stent (2018), CKD3, PAF, DM2, ICM s/p LVAD (2018)     Neuro: A/Ox4.  Cardiac: SR with HR 80s-90s AVSS.   Respiratory: O2 sats stable on RA  GI/: voiding spontaneously. No BM this shift, last BM 5/27  Diet/appetite: regular diet   Activity:  independent  Pain: denies  Skin: No new deficits noted. Sternal incision with small amount of drainage noted, cleansed with microklenz. Driveline site, packed, dressing changed CDI. Old CT site dressing CDI.   LDA's: double lumen PICC SL.     Plan: Possible discharge at the end of the week. Continue with POC. Notify primary team with changes.

## 2020-05-28 NOTE — PLAN OF CARE
D: Pt admitted 5/17, s/p open heart transplant 5/18. Pt has a past medical history of CAD s/p STEMI s/p stent (2018), CKD 3, PAF, DM2, ICM s/p LVAD (2018)      I/A: A&Ox4, VSS, afebrile, on RA with O2 sats 99% on RA. Denies pain, Sinus Rhythm/Sinus Tach on the tele monitor. Blood sugars 190's-220's over last night and today. Pt's NPH insulin regimen changed to Lantus 35 units daily, which pt was taking prior to admission. K+ elevated at 5.5 this am-->5.4 at noon. Re-check K+ scheduled for 1800. CXR done in am. MRI of prostate done this afternoon for elevated PSA and renal nephrolithiasis, per provider's note. Mid-sternal       P: Potential discharge to [facility] tomorrow pending tomorrow's K+ level. Continue with current plant of care. Contact CVTS ([facility] consulting) for questions or concerns.    Paola Slade, RN  Cardiology

## 2020-05-28 NOTE — DISCHARGE SUMMARY
[facility], [place]   [facility] Discharge Summary     Mariusz Sharp MRN# [id number]   Age: 59 year old YOB: 1960     Admitting Physician:  Elder Willis MD  Discharge Physician:  CARMEN Jimenez CNP  Primary Care Physician:        Milad Fry     DATE OF ADMISSION: 5/17/2020      DATE OF DISCHARGE: May 29, 2020          Primary Diagnoses:   1. Ischemic Cardiomyopathy   2. Acute Systolic Heart Failure   3. S/P Orthotopic Heart Transplant 5/18/2020  4. Nonsustained Ventricular Tachycardia   5. Elevated PSA   6. Post-op Anemia   7. Chronic Kidney Disease, Stage III           Secondary Diagnoses:   1. Diabetes Mellitus Type II on Insulin   2. Hx of LV thrombus   3. S/p ICD, s/p removal 5/18/2020  4.  Hx of R-sided nephrolithiases   5. Hyperlipidemia   6. Hypertension     PROCEDURES PERFORMED:   Date: 5/18/2020.  Surgeon: Dr. Willis/Dr. Baker  Redo sternotomy, LVAD explant, Orthotopic heart transplant, and Removal of AICD      INTRAOPERATIVE COMPLICATIONS:  none    PATHOLOGY RESULTS:  5/24/2020: heart biopsy  FINAL DIAGNOSIS:   Heart, allograft, right ventricle, endomyocardial biopsy:   - Acute cellular rejection: Focal mild rejection, Grade 1R/1A (ISHLT 2004)        - Antibody-mediated rejection: No evidence of antibody-mediated   rejection        - C3d and C4d results will be reported as addendum        - Additional findings:             - Ischemia/reperfusion injury     CULTURE RESULTS:  Negative     CONSULTS:    1.  PT/OT  2.  Cardiology   3.  Endocardiology   4.  Infectious Disease     BRIEF HISTORY OF ILLNESS:  Mariusz Sharp is a 59 year old male history of CAD (s/p STEMI with ALYSSA to LAD 6/27/18), LV thrombus, CKD Stage III, PAF, DM Type II (on insulin), and ICM with EF 20-25% s/p HeartMate 3 LVAD implant 12/31/18, he presented from home (COVID negative) on 5/17/20 with an offer for a donor organ and had an orthotopic heart  transplant with removal of ICD with Dr Willis and Dr Baker on 5/18.     HOSPITAL COURSE: Mr. Sharp underwent the above-named procedures. He tolerated the operation well and postoperatively was admitted to the CVICU. He was quickly weaned off pressors and  he was extubated on POD # 1 to 2 lpm via NC with neuro status intact. Endocrine was consulted for transition off of the insulin gtt. ID was consulted after the donor's sputum and blood had growth. He responded well to gentle diuresis before transferring out of the ICU POD# 3.    Rhythm remained stable until 5/23 he had episodes of nonsustained VT. Terbutaline was decreased from 10 mg to 5 mg then off (5/24). Heart rates have been in the 90's with no recurrent VT. He had his first heart biopsy 5/24 due to VT, which showed 1R and no AMR. He is scheduled for a second biopsy on Monday, June 1, 2020. He is to continue ASA 81 mg and rosuvastatin. Cardiology is dosing his immunosuppression, Simulect given 5/22 and Tacrolimus was started 5/23. His potassium and creat were elevated 5/28, was given a one time dose of Kexalate and Bactrim is being held. Potassium improved, creat slightly elevated but stable from baseline standpoint.      He has a history of stage III renal disease and his creatinine did peak at 2.81 post-transplant, but has been trending down. He has not required any diuretics since 5/22. He was found to have an elevated PSA pre-transplant and will get a prostate MRI prior to discharge as a baseline with plans to follow PSA as an outpatient.     ID was consulted 5/21 when the donor blood grew S anginosus and Veillonella sp.  The S anginosus is usually susceptible to cephalosporins and Veillonella to flagyl. After the routine zosyn (ends 5/22) he was switched to IV Vancomycin 5/22 through 5/26. He was then started on Doxycycline 100 mg BID till 6/1/2020 as per ID. Was on flagyl 500 mg tid PO until 5/25. Of note, per ID the S aureus and the H influenza in the  "sputum of the donor are of no clinical value in this recipient of the donor's heart. WBC up slightly at 11.5, he has remained afebrile, no signs or symptoms of infection. He does have a Driveline wound that is getting BID dressing changes with moistened Nugauze strips packing.     Prior to discharge, His pain was controlled well, he was able to perform most ADLs and ambulate without difficulty, and had full return of bowel and bladder function.  On May 29, 2020, he was discharged to home, [facility], in stable condition.    Patient discharged on aspirin:  Yes 81 mg daily   Patient discharged on beta blocker: no    Patient discharged on ACE Inhibitor/ARB: no             Discharge Disposition:     Discharged to home            Condition on Discharge:     Discharge condition: Stable   Discharge vitals: Blood pressure (!) 136/96, pulse 94, temperature 97.8  F (36.6  C), temperature source Oral, resp. rate 16, height 1.626 m (5' 4\"), weight 87.4 kg (192 lb 9.6 oz), SpO2 99 %.     Code status on discharge: Full Code     Vitals:    05/27/20 0300 05/28/20 0338 05/28/20 0906   Weight: 89.2 kg (196 lb 11.2 oz) 89.5 kg (197 lb 6.4 oz) 88.4 kg (194 lb 12.8 oz)       DAY OF DISCHARGE PHYSICAL EXAM:    MAPs: 102 - 114  Gen:  NAD, conversational  CV: RRR, S1S2 normal, no murmurs, rubs, or gallops  Pulm:  CTA, no rhonchi or wheezes  Abd:  Soft, nondistended, NTTP  Ext: No dependent edema  Incision: Clean, dry, intact, no erythema  Chest Tube sites: Dressings clean and dry  Lines: Drive line site dressing clean and dry    LABS  BMP  Recent Labs   Lab 05/28/20  1139 05/28/20  0758 05/28/20  0547 05/27/20  0643 05/26/20  0518 05/25/20  0653   NA  --   --  135 137 138 134   POTASSIUM 5.4* 5.5* 5.9* 5.1 4.6 5.2   CHLORIDE  --   --  108 110* 110* 108   ARLENE  --   --  8.2* 7.9* 8.2* 8.2*   CO2  --   --  17* 21 20 20   BUN  --   --  45* 41* 41* 35*   CR 1.51*  --  1.49* 1.32* 1.42* 1.31*   GLC  --   --  218* 168* 63* 127* "     CBC  Recent Labs   Lab 05/28/20  0547 05/27/20  0643 05/26/20  0518 05/25/20  0653   WBC 11.9* 11.5* 11.3* 10.4   RBC 3.35* 3.27* 3.15* 3.11*   HGB 8.9* 8.3* 8.1* 8.1*   HCT 29.5* 28.5* 27.2* 26.7*   MCV 88 87 86 86   MCH 26.6 25.4* 25.7* 26.0*   MCHC 30.2* 29.1* 29.8* 30.3*   RDW 17.7* 17.8* 17.2* 17.2*    335 295 267     INR  No lab results found in last 7 days.   Liver Function Studies -   Recent Labs   Lab Test 05/25/20  0653   PROTTOTAL 5.3*   ALBUMIN 2.6*   BILITOTAL 0.7   ALKPHOS 33*   AST 23   ALT 47     Recent Labs   Lab 05/28/20  1137 05/28/20  0741 05/28/20  0547 05/28/20  0344 05/27/20  2149 05/27/20  1808 05/27/20  1236  05/27/20  0643  05/26/20  0518  05/25/20  0653  05/24/20  1137  05/24/20  0647   GLC  --   --  218*  --   --   --   --   --  168*  --  63*  --  127*  --  86  --  91   * 196*  --  188* 196* 160* 142*   < >  --    < >  --    < >  --    < >  --    < >  --     < > = values in this interval not displayed.       ECHOCARDIOGRAM, 5/23/2020- Interpretation Summary  Technically difficult study. 1st echo after heart transplant.  Global and regional left ventricular function is hyperkinetic with an EF of 65-70%.  Right ventricular function, chamber size, wall motion, and thickness are normal.  No pericardial effusion is present.      CXR 5/28/2020- IMPRESSION:  1. Postsurgical changes of cardiac transplantation. Interval removal  of mediastinal drain.  2. Decreased left basilar opacities, likely resolving atelectasis.     DISCHARGE INSTRUCTIONS:  You will be notified of a follow up visit with CVTS Surgeon at the [address] main [facility] building.   Your transplant coordinator with arrange you Cardiology follow up, labs and diagnostics.     DISCHARGE MEDICATIONS:    Red Sharp   Home Medication Instructions EARL:[id number]    Printed on:05/29/20 1125   Medication Information      ACCU-CHEK [place]OTTE PLUS test strip       acetaminophen (TYLENOL) 325 MG tablet  Take 2 tablets (650  mg) by mouth every 4 hours as needed for other (multimodal surgical pain management along with NSAIDS and opioid medication as indicated based on pain control and physical function.)     aspirin (ASA) 81 MG chewable tablet  Take 1 tablet (81 mg) by mouth daily     BD SILVANA U/F 32G X 4 MM insulin pen needle       calcium carbonate 600 mg-vitamin D 400 units (CALTRATE) 600-400 MG-UNIT per tablet  Take 1 tablet by mouth 2 times daily (with meals)     doxycycline hyclate (VIBRAMYCIN) 100 MG capsule  Take 1 capsule (100 mg) by mouth every 12 hours     insulin aspart (NOVOLOG PEN) 100 UNIT/ML pen  Correction Scale - HIGH INSULIN RESISTANCE DOSING     Do Not give Correction Insulin if Pre-Meal BG less than 140.   For Pre-Meal  - 164 give 1 unit.   For Pre-Meal  - 189 give 2 units.   For Pre-Meal  - 214 give 3 units.   For Pre-Meal  - 239 give 4 units.   For Pre-Meal  - 264 give 5 units.   For Pre-Meal  - 289 give 6 units.   For Pre-Meal  - 314 give 7 units.   For Pre-Meal  - 339 give 8 units.   For Pre-Meal  - 364 give 9 units.   For Pre-Meal BG greater than or equal to 365 give 10 units  To be given with prandial insulin, and based on pre-meal blood glucose.   Notify provider if glucose greater than or equal to 350 mg/dL after administration of correction dose. If given at mealtime, administer within 30 minutes of start of meal     insulin aspart (NOVOLOG PEN) 100 UNIT/ML pen  Inject 1-7 Units Subcutaneous At Bedtime HIGH INSULIN RESISTANCE DOSING    Do Not give Bedtime Correction Insulin if BG less than 200.   For  - 224 give 1 units.   For  - 249 give 2 units.   For  - 274 give 3 units.   For  - 299 give 4 units.   For  - 324 give 5 units.   For  - 349 give 6 units.   For BG greater than or equal to 350 give 7 units.   Notify provider if glucose greater than or equal to 350 mg/dL after administration of correction dose. If given at  mealtime, administer within 30 minutes of start of meal     insulin aspart (NOVOLOG PEN) 100 UNIT/ML pen  DOSE:  1 units per 5 grams of carbohydrate.     insulin glargine (LANTUS PEN) 100 UNIT/ML pen  Inject 40 Units Subcutaneous every 24 hours     melatonin 1 MG TABS tablet  Take 1 tablet (1 mg) by mouth nightly as needed for sleep     mycophenolate (GENERIC EQUIVALENT) 500 MG tablet  Take 3 tablets (1,500 mg) by mouth 2 times daily     nystatin (MYCOSTATIN) 162588 UNIT/ML suspension  Take 10 mLs (1,000,000 Units) by mouth 4 times daily     pantoprazole (PROTONIX) 40 MG EC tablet  Take 1 tablet (40 mg) by mouth every morning (before breakfast)     predniSONE (DELTASONE) 10 MG tablet  Take 2 tablets (20 mg) by mouth 2 times daily (with meals) Or as directed by transplant team     rosuvastatin (CRESTOR) 10 MG tablet  Take 1 tablet (10 mg) by mouth daily     tacrolimus (GENERIC EQUIVALENT) 0.5 MG capsule  Total of 2 mg BID or as directed by transplant team     tacrolimus (GENERIC EQUIVALENT) 1 MG capsule  For a total of 2 mg BID or as directed by transplant team     valGANciclovir (VALCYTE) 450 MG tablet  Take 1 tablet (450 mg) by mouth daily         CC:Milad Arellano      [facility]   Cardiothoracic Surgery  Office phone: [phone]  Office fax: [phone]

## 2020-05-28 NOTE — PROGRESS NOTES
Diabetes Consult Daily  Progress Note          Assessment/Plan:                          Mariusz Moon is a 59 year old male with PMHx TIIDM, HTN, HLP, ischemic heart disease s/p STEMI with ALYSSA 2019, LVAD placement, CKD III and obesity who was admitted 5/17/20 for heart transplant. He underwent heart transplant on 5/18/20 with Dr. Willis. Donor blood cuultures and respiratory cultures returned positive for H flu, staph and strep      Type II Diabetes Mellitus: uncontrolled (A1c 8.0%), with steroid induced hyperglycemia following heart transplant        Plan:  -NPH  20 units with Prednisone ( 0830), discontinue  -NPH 5 units with Prednisone 25 mg ( 1800), discontinue  -start lantus 35 units at bedtime tonight  -Novolog 1 unit per 7 grams of CHO for meals/snacks/supplements  -Novolog high intensity sliding scale before meals and HS  -monitor glucose before meals, HS and 0200  -hypoglycemia protocol  -will continue to follow     Outpatient diabetes follow up: TBD  Plan discussed with  Patient            Interval History:   The last 24 hours progress and nursing notes reviewed.  Blood sugars have been moderately controlled on current regime  He is normally on lantus 35 units at bedtime ( currently on NPH), NPH dose is so close to PTA lantus dose, will transition to lantus at bedtime, will be easier when discharged  May need adjustment in the I:C ratio while on prednisone  Appetite is reported as good, denies n/v  working with therapies           Steroid taper:  Prednisone 25 mg twice dialy starting 5/26 x 4 doses  Prednisone 20 mg twice daily ( starting 5/28 at 0800)    Recent Labs   Lab 05/28/20  1137 05/28/20  0741 05/28/20  0547 05/28/20  0344 05/27/20  2149 05/27/20  1808 05/27/20  1236  05/27/20  0643  05/26/20  0518  05/25/20  0653  05/24/20  1137  05/24/20  0647   GLC  --   --  218*  --   --   --   --   --  168*  --  63*  --  127*  --  86  --  91   * 196*  --  188* 196* 160* 142*   " < >  --    < >  --    < >  --    < >  --    < >  --     < > = values in this interval not displayed.               Review of Systems:   See interval hx          Medications:     Orders Placed This Encounter      Regular Diet Adult       Physical Exam:  Gen: Alert, sitting in a chair, NAD   HEENT: mucous membranes are moist  Resp: non-labored  Ext: moving all extremities  Neuro:oriented x3, communicating clearly  /84 (BP Location: Left arm)   Pulse 94   Temp 98.2  F (36.8  C) (Oral)   Resp 18   Ht 1.626 m (5' 4\")   Wt 88.4 kg (194 lb 12.8 oz)   SpO2 96%   BMI 33.44 kg/m             Data:     Lab Results   Component Value Date    A1C 8.0 05/12/2020    A1C 9.5 07/05/2019    A1C 9.4 01/11/2019    A1C 10.1 12/30/2018    A1C 10.6 12/20/2018              CBC RESULTS:   Recent Labs   Lab Test 05/28/20  0547   WBC 11.9*   RBC 3.35*   HGB 8.9*   HCT 29.5*   MCV 88   MCH 26.6   MCHC 30.2*   RDW 17.7*        Recent Labs   Lab Test 05/28/20  0758 05/28/20  0547 05/27/20  0643   NA  --  135 137   POTASSIUM 5.5* 5.9* 5.1   CHLORIDE  --  108 110*   CO2  --  17* 21   ANIONGAP  --  9 6   GLC  --  218* 168*   BUN  --  45* 41*   CR  --  1.49* 1.32*   ARLENE  --  8.2* 7.9*     Liver Function Studies -   Recent Labs   Lab Test 05/25/20  0653   PROTTOTAL 5.3*   ALBUMIN 2.6*   BILITOTAL 0.7   ALKPHOS 33*   AST 23   ALT 47     Lab Results   Component Value Date    INR 1.54 05/18/2020    INR 2.21 05/18/2020    INR 1.42 05/18/2020    INR 2.19 05/18/2020    INR 2.21 05/15/2020    INR 2.76 05/14/2020    INR 2.88 05/13/2020    INR 2.47 05/12/2020    INR 2.5 05/11/2020    INR 2.4 04/24/2020    INR 2.3 04/03/2020    INR 2.5 03/20/2020         I spent a total of 25 minutes bedside and on the inpatient unit managing the glycemic care of Mariusz Sharp. Over 50% of my time on the unit was spent counseling the patient  and/or coordinating care regarding .  See note for details.      Shelley Vargas -[phone]  Diabetes Management job " code [id number]

## 2020-05-28 NOTE — TELEPHONE ENCOUNTER
Pharmacist provided medication teaching for discharge with a focus on new medications/dose changes.  The discharge medication list was reviewed with the patient and the following points were discussed, as applicable: Name, description, purpose, dose/strength, duration of medications, common side effects, food/medications to avoid, action to be taken if dose is missed, when to call MD, safe disposal of unused medications and how to obtain refills.  Education was given via phone conversation per COVID-19 precautions.     Patient chooses to receive medications from  specialty pharmacy.  Updated pharmacy patient call list. Notified pharmacy.      Patient will  specialty pharmacy information at pharmacy when discharges. Patient states he has a bp monitor, 7 day pill organizers (2) and a thermometer.      Clinical Pharmacy Consult:                                                      Transplant Specific:   Date of Transplant: 05/18/2020  Type of Transplant: heart  First Transplant: yes  History of rejection: no    Immunosuppression Regimen   TAC 1.5mg qAM & 1.5mg qPM and MMF 1500mg qAM & 1500mg qPM, prednisone taper.  Patient specific goal: tac: 10-12  Most recent level: <3, date 05/28/2020  Immunosuppressant Levels:  Subtherapeutic  Pt adherent to lab draws: yes  Scr:   Lab Results   Component Value Date    CR 1.51 05/28/2020     Side effects: Tremors (periodic) and Diarrhea    Prophylactic Medications  Antibacterial:  Bactrim 400/80 on hold  Scheduled Discontinue Date: 6 months    Antifungal: Nystatin  Scheduled Discontinue Date: 6 months    Antiviral: CrCl >/=60 mL/minute: Valcyte 900mg daily   Scheduled Discontinue Date: 6 months    Acid Reducer: Protonix (pantoprazole)  Scheduled Reviewed Date: 3 months    Thrombosis Prevention: Aspirin 81 mg PO daily  Scheduled Discontinue Date: per MD    Blood Pressure Management  Frequency of home Blood Pressure checks: once daily  Most recent home BP:  131/83    Hospitalizations/ER visits since last assessment: 0      Med rec/DUR performed: yes  Med Rec Discrepancies: no    Reminders:    1.  Bring to first clinic appt: med box, med card, bp monitor, all medications being taken, and lab book.  2.    MTM pharmacist visit on first clinic appt and if ok, again in 3 to 4 months during follow up appt.  3.   Avoid Grapefruit and Grapefruit juice.   4.   Avoid herbal supplements. If wish to take other medications or supplements, call your coordinator.   5.   Keep lab appts.   6.  Can use apps on phone like [employer] to help manage medication lists and reminders.   7.  Make sure you are protecting your skin by wearing long sleeves and applying sunscreen to exposed skin, for any significant time in the sun.     Transplant Coordinator is Angelika ANAND     No further questions for this pharmacist.       Sam Ortiz [facility]

## 2020-05-28 NOTE — PLAN OF CARE
Neuro: A&Ox4.   Cardiac: SR 90's VSS.  Pericardial drain pulled today.    Respiratory: Sating >92% on RA. Denies SOB  GI/: Adequate urine output. BM yesterday.  Diet/appetite: Tolerating regular diet. Eating well.  Activity:  Assist of up ad domingo, up to chair and in halls.  Pain: Denies   Skin: No new deficits noted. Old CT sites c/d/I covered with gauze.  Drive line site cleansed and packed with gauze strip.    LDA's:  R double lumen PICC    Plan: Will discharge to [facility] when medically cleared.  He resides in [place].  Continue with POC. Notify primary team with changes.

## 2020-05-28 NOTE — PROGRESS NOTES
[facility]   Cardiology II Service / Advanced Heart Failure  Daily Progress Note  Date of Service: 5/28/2020      Patient: Mariusz Sharp  MRN: [id number]  Admission Date: 5/17/2020  Hospital Day # 10    Assessment and Plan: Mariusz Sharp is a 59 year old male with PMH of CAD, LV thrombus, CKD Stage III, PAF, DM Type II, and ICM s/p HM III LVAD as BTT with subsequent OHT 5/18/20.     S/p OHT secondary to ICM. 5/18/20. Echo 5/23/20 with EF 65-70% and normal graft function.   RHC 5/24 with mRA-7, RV-30/6, mPA-24, mPCWP-15, AMRIT CO-5.12, AMRIT CI-2.62, biopsy negative.   Immunosuppression: Simulect 5/18 and 5/22. Tac increased to 2 mg po BID last HS, tac level < 3. Goal-10-12. Cellcept 1500 mg po BID, and Prednisone taper.   Serostatus: CMV: D+, R-, EBV: D- R+, Toxo D- R-  Prophylaxis; Nystatin and Valcyte. Bactrim on hold in setting of hyperkalemia.   - Continue ASA and Crestor.   - Next RHC/boipsy due 6/1.     Hyperkalemia. K 5.9 this AM.   - K 5.4 on recheck.   - No evidence for peaked T waves on EKG.      NSVT. Recurrent bursts NSVT with no hemodynamic compromise.   - Electrolytes optimized.    - RHC/biopsy negative 5/24.      P Acnes to LVAD outflow graft.   - Appreciate ID input.   - ID recommended discontinue Vanco and initiate Doxycycline 100 mg po BID through 6/1/20.     Donor positive S Anginosus and Veillonella. Questionable contaminant. Zosyn completed 5/22. Note Staph Anginosus sensitive to Ceftriaxone, Clindamycin, Erythromycin, PCN, and Vanco. Flagyl completed today. Vanco completed 5/27.   - Appreciate ID input.   - Continue Doxycycline 100 mg po BID through 6/1/20.     DM Type II.   - Management per Endocrinology.      Elevated PSA with Renal Nephrolithiasis.   - PSA-8.21. MRI today.   - Follow up with Urology.   ================================================================  Interval History/ROS: He is feeling well. He denies fever, chills, chest pain, palpitations, cough, RADER,  "nausea, vomiting, diarrhea, and LE edema. He is tolerating oral intake and ambulation.     Last 24 hr care team notes reviewed.   ROS:  4 point ROS including Respiratory, CV, GI and , other than that noted in the HPI, is negative.     Medications: Reviewed in EPIC.     Physical Exam:   /83 (BP Location: Left arm)   Pulse 94   Temp 98.1  F (36.7  C) (Oral)   Resp 18   Ht 1.626 m (5' 4\")   Wt 88.4 kg (194 lb 12.8 oz)   SpO2 100%   BMI 33.44 kg/m    GENERAL: Appears alert and oriented times three.   HEENT: Eye symmetrical and free of discharge bilaterally. Mucous membranes moist and without lesions.  NECK: Supple and without lymphadenopathy. JVD below clavicular line upright.   CV: RRR, S1S2 present without murmur, rub, or gallop.   RESPIRATORY: Respirations regular, even, and unlabored. Lungs CTA throughout.   GI: Soft and non distended with normoactive bowel sounds present in all quadrants. No tenderness, rebound, guarding. No organomegaly.   EXTREMITIES: No peripheral edema. 2+ bilateral pedal pulses.   NEUROLOGIC: Alert and orientated x 3. CN II-XII grossly intact. No focal deficits.   MUSCULOSKELETAL: No joint swelling or tenderness.   SKIN: No jaundice. No rashes or lesions.     Data:  CMP  Recent Labs   Lab 05/28/20  1139 05/28/20  0758 05/28/20  0547 05/27/20  0643 05/26/20  0518 05/25/20  0653  05/24/20  0647 05/23/20  0619  05/22/20  2156  05/22/20  0745   NA  --   --  135 137 138 134   < > 138 140   < >  --   --  139   POTASSIUM 5.4* 5.5* 5.9* 5.1 4.6 5.2   < > 4.6 3.8   < >  --    < > 3.2*   CHLORIDE  --   --  108 110* 110* 108  --  110* 110*   < >  --   --  107   CO2  --   --  17* 21 20 20  --  20 22   < >  --   --  24   ANIONGAP  --   --  9 6 8 6  --  8 8   < >  --   --  8   GLC  --   --  218* 168* 63* 127*   < > 91 181*   < >  --   --  191*   BUN  --   --  45* 41* 41* 35*  --  36* 31*   < >  --   --  41*   CR 1.51*  --  1.49* 1.32* 1.42* 1.31*  --  1.36* 1.32*   < >  --   --  1.64* "   GFRESTIMATED 50*  --  51* 59* 54* 60*  --  57* 59*   < >  --   --  46*   GFRESTBLACK 58*  --  59* 69 63 69  --  66 69   < >  --   --  53*   ARLENE  --   --  8.2* 7.9* 8.2* 8.2*  --  8.3* 8.2*   < >  --   --  8.1*   MAG  --   --   --  1.9 1.9 2.1  --  2.1 2.5*   < >  --   --  2.2   PHOS  --   --   --   --  4.1 3.5  --   --  2.9  --  1.8*  --  1.9*   PROTTOTAL  --   --   --   --   --  5.3*  --   --   --   --   --   --  5.7*   ALBUMIN  --   --   --   --   --  2.6*  --   --   --   --   --   --  2.9*   BILITOTAL  --   --   --   --   --  0.7  --   --   --   --   --   --  0.5   ALKPHOS  --   --   --   --   --  33*  --   --   --   --   --   --  31*   AST  --   --   --   --   --  23  --   --   --   --   --   --  16   ALT  --   --   --   --   --  47  --   --   --   --   --   --  19    < > = values in this interval not displayed.     CBC  Recent Labs   Lab 05/28/20  0547 05/27/20  0643 05/26/20  0518 05/25/20  0653   WBC 11.9* 11.5* 11.3* 10.4   RBC 3.35* 3.27* 3.15* 3.11*   HGB 8.9* 8.3* 8.1* 8.1*   HCT 29.5* 28.5* 27.2* 26.7*   MCV 88 87 86 86   MCH 26.6 25.4* 25.7* 26.0*   MCHC 30.2* 29.1* 29.8* 30.3*   RDW 17.7* 17.8* 17.2* 17.2*    335 295 267     Patient discussed with Dr. Nielson.      Antonia Byrne FNP  5/28/2020

## 2020-05-28 NOTE — PROGRESS NOTES
CLINICAL NUTRITION SERVICES - DISCHARGE NOTE    Patient s discharge needs assessed and discharge planning has been conducted with the multidisciplinary transplant care team including physicians, pharmacy, social work and transplant coordinator.    Follow up/Monitoring:  Once discharged, place outpatient nutrition consult via the transplant team if nutrition concerns arise.    Dorcas Meek, MS, RD, LD, [facility]   6C Pgr: [phone]

## 2020-05-28 NOTE — PHARMACY-CONSULT NOTE
Solid Organ Transplant Recipient Prior to Discharge Note    59 year old male s/p orthotopic heart  transplant on 5/18/20.    Pharmacy has monitored for medication interactions and immunosuppression levels in conjunction with the multidisciplinary team. In anticipation for discharge, medication therapy needs have been addressed daily throughout the current admission via multidisciplinary rounds and/or discussions, order verification, daily clinical pharmacy review, and communication with prescribers.  Of note,   -tacrolimus is currently not therapeutic as of 5/28/20.  -bactrim held this morning due to rising potassium.   Bright Amaral, Pharm.D, BCPS

## 2020-05-29 ENCOUNTER — TELEPHONE (OUTPATIENT)
Dept: TRANSPLANT | Facility: CLINIC | Age: 60
End: 2020-05-29

## 2020-05-29 ENCOUNTER — APPOINTMENT (OUTPATIENT)
Dept: OCCUPATIONAL THERAPY | Facility: CLINIC | Age: 60
DRG: 001 | End: 2020-05-29
Attending: THORACIC SURGERY (CARDIOTHORACIC VASCULAR SURGERY)
Payer: COMMERCIAL

## 2020-05-29 VITALS
RESPIRATION RATE: 18 BRPM | TEMPERATURE: 97.7 F | BODY MASS INDEX: 32.88 KG/M2 | HEART RATE: 94 BPM | OXYGEN SATURATION: 98 % | WEIGHT: 192.6 LBS | SYSTOLIC BLOOD PRESSURE: 137 MMHG | DIASTOLIC BLOOD PRESSURE: 99 MMHG | HEIGHT: 64 IN

## 2020-05-29 DIAGNOSIS — Z11.59 ENCOUNTER FOR SCREENING FOR OTHER VIRAL DISEASES: Primary | ICD-10-CM

## 2020-05-29 LAB
ANION GAP SERPL CALCULATED.3IONS-SCNC: 5 MMOL/L (ref 3–14)
BUN SERPL-MCNC: 49 MG/DL (ref 7–30)
CALCIUM SERPL-MCNC: 8.3 MG/DL (ref 8.5–10.1)
CHLORIDE SERPL-SCNC: 108 MMOL/L (ref 94–109)
CO2 SERPL-SCNC: 21 MMOL/L (ref 20–32)
CREAT SERPL-MCNC: 1.46 MG/DL (ref 0.66–1.25)
ERYTHROCYTE [DISTWIDTH] IN BLOOD BY AUTOMATED COUNT: 17.9 % (ref 10–15)
GFR SERPL CREATININE-BSD FRML MDRD: 52 ML/MIN/{1.73_M2}
GLUCOSE BLDC GLUCOMTR-MCNC: 198 MG/DL (ref 70–99)
GLUCOSE BLDC GLUCOMTR-MCNC: 209 MG/DL (ref 70–99)
GLUCOSE BLDC GLUCOMTR-MCNC: 233 MG/DL (ref 70–99)
GLUCOSE SERPL-MCNC: 235 MG/DL (ref 70–99)
HCT VFR BLD AUTO: 27.8 % (ref 40–53)
HGB BLD-MCNC: 8.4 G/DL (ref 13.3–17.7)
INTERPRETATION ECG - MUSE: NORMAL
MCH RBC QN AUTO: 26.5 PG (ref 26.5–33)
MCHC RBC AUTO-ENTMCNC: 30.2 G/DL (ref 31.5–36.5)
MCV RBC AUTO: 88 FL (ref 78–100)
PLATELET # BLD AUTO: 330 10E9/L (ref 150–450)
POTASSIUM SERPL-SCNC: 5.3 MMOL/L (ref 3.4–5.3)
RBC # BLD AUTO: 3.17 10E12/L (ref 4.4–5.9)
SARS-COV-2 PCR COMMENT: NORMAL
SARS-COV-2 RNA SPEC QL NAA+PROBE: NEGATIVE
SARS-COV-2 RNA SPEC QL NAA+PROBE: NORMAL
SODIUM SERPL-SCNC: 134 MMOL/L (ref 133–144)
SPECIMEN SOURCE: NORMAL
SPECIMEN SOURCE: NORMAL
TACROLIMUS BLD-MCNC: 3 UG/L (ref 5–15)
TME LAST DOSE: ABNORMAL H
WBC # BLD AUTO: 12.3 10E9/L (ref 4–11)

## 2020-05-29 PROCEDURE — 36415 COLL VENOUS BLD VENIPUNCTURE: CPT | Performed by: PHYSICIAN ASSISTANT

## 2020-05-29 PROCEDURE — 97530 THERAPEUTIC ACTIVITIES: CPT | Mod: GO

## 2020-05-29 PROCEDURE — 00000146 ZZHCL STATISTIC GLUCOSE BY METER IP

## 2020-05-29 PROCEDURE — 25000131 ZZH RX MED GY IP 250 OP 636 PS 637: Performed by: NURSE PRACTITIONER

## 2020-05-29 PROCEDURE — 80197 ASSAY OF TACROLIMUS: CPT | Performed by: NURSE PRACTITIONER

## 2020-05-29 PROCEDURE — 25000132 ZZH RX MED GY IP 250 OP 250 PS 637: Performed by: THORACIC SURGERY (CARDIOTHORACIC VASCULAR SURGERY)

## 2020-05-29 PROCEDURE — 25000132 ZZH RX MED GY IP 250 OP 250 PS 637: Performed by: STUDENT IN AN ORGANIZED HEALTH CARE EDUCATION/TRAINING PROGRAM

## 2020-05-29 PROCEDURE — 85027 COMPLETE CBC AUTOMATED: CPT | Performed by: PHYSICIAN ASSISTANT

## 2020-05-29 PROCEDURE — 25000132 ZZH RX MED GY IP 250 OP 250 PS 637: Performed by: SURGERY

## 2020-05-29 PROCEDURE — 25000131 ZZH RX MED GY IP 250 OP 636 PS 637: Performed by: PHYSICIAN ASSISTANT

## 2020-05-29 PROCEDURE — 25000132 ZZH RX MED GY IP 250 OP 250 PS 637: Performed by: NURSE PRACTITIONER

## 2020-05-29 PROCEDURE — 97535 SELF CARE MNGMENT TRAINING: CPT | Mod: GO

## 2020-05-29 PROCEDURE — 25000132 ZZH RX MED GY IP 250 OP 250 PS 637: Performed by: PHYSICIAN ASSISTANT

## 2020-05-29 PROCEDURE — 36415 COLL VENOUS BLD VENIPUNCTURE: CPT | Performed by: NURSE PRACTITIONER

## 2020-05-29 PROCEDURE — 80048 BASIC METABOLIC PNL TOTAL CA: CPT | Performed by: PHYSICIAN ASSISTANT

## 2020-05-29 PROCEDURE — U0003 INFECTIOUS AGENT DETECTION BY NUCLEIC ACID (DNA OR RNA); SEVERE ACUTE RESPIRATORY SYNDROME CORONAVIRUS 2 (SARS-COV-2) (CORONAVIRUS DISEASE [COVID-19]), AMPLIFIED PROBE TECHNIQUE, MAKING USE OF HIGH THROUGHPUT TECHNOLOGIES AS DESCRIBED BY CMS-2020-01-R: HCPCS | Performed by: NURSE PRACTITIONER

## 2020-05-29 RX ORDER — MYCOPHENOLATE MOFETIL 500 MG/1
1500 TABLET ORAL 2 TIMES DAILY
Qty: 96 TABLET | Refills: 1 | Status: SHIPPED | OUTPATIENT
Start: 2020-05-29 | End: 2020-06-09

## 2020-05-29 RX ORDER — TACROLIMUS 0.5 MG/1
CAPSULE ORAL
Qty: 120 CAPSULE | Refills: 0 | Status: SHIPPED | OUTPATIENT
Start: 2020-05-29 | End: 2020-06-08

## 2020-05-29 RX ORDER — PANTOPRAZOLE SODIUM 40 MG/1
40 TABLET, DELAYED RELEASE ORAL
Qty: 30 TABLET | Refills: 1 | Status: SHIPPED | OUTPATIENT
Start: 2020-05-30 | End: 2020-06-22

## 2020-05-29 RX ORDER — VALGANCICLOVIR 450 MG/1
450 TABLET, FILM COATED ORAL DAILY
Qty: 30 TABLET | Refills: 0 | Status: SHIPPED | OUTPATIENT
Start: 2020-05-30 | End: 2020-06-15

## 2020-05-29 RX ORDER — TACROLIMUS 1 MG/1
CAPSULE ORAL
Qty: 120 CAPSULE | Refills: 0 | Status: SHIPPED | OUTPATIENT
Start: 2020-05-29 | End: 2020-06-01

## 2020-05-29 RX ORDER — NYSTATIN 100000/ML
1000000 SUSPENSION, ORAL (FINAL DOSE FORM) ORAL 4 TIMES DAILY
Qty: 1240 ML | Refills: 1 | Status: SHIPPED | OUTPATIENT
Start: 2020-05-29 | End: 2020-06-08 | Stop reason: ALTCHOICE

## 2020-05-29 RX ORDER — PREDNISONE 10 MG/1
20 TABLET ORAL 2 TIMES DAILY WITH MEALS
Qty: 120 TABLET | Refills: 0 | Status: SHIPPED | OUTPATIENT
Start: 2020-05-29 | End: 2020-06-02

## 2020-05-29 RX ORDER — DOXYCYCLINE 100 MG/1
100 CAPSULE ORAL EVERY 12 HOURS
Qty: 60 CAPSULE | Refills: 0 | Status: SHIPPED | OUTPATIENT
Start: 2020-05-29 | End: 2020-06-02

## 2020-05-29 RX ORDER — ACETAMINOPHEN 325 MG/1
650 TABLET ORAL EVERY 4 HOURS PRN
COMMUNITY
Start: 2020-05-29

## 2020-05-29 RX ORDER — VALGANCICLOVIR 450 MG/1
450 TABLET, FILM COATED ORAL DAILY
Status: DISCONTINUED | OUTPATIENT
Start: 2020-05-30 | End: 2020-05-29 | Stop reason: HOSPADM

## 2020-05-29 RX ORDER — ROSUVASTATIN CALCIUM 10 MG/1
10 TABLET, COATED ORAL DAILY
Qty: 30 TABLET | Refills: 0 | Status: SHIPPED | OUTPATIENT
Start: 2020-05-30 | End: 2020-06-22

## 2020-05-29 RX ADMIN — PREDNISONE 20 MG: 20 TABLET ORAL at 08:51

## 2020-05-29 RX ADMIN — VALGANCICLOVIR 900 MG: 450 TABLET, FILM COATED ORAL at 08:51

## 2020-05-29 RX ADMIN — MYCOPHENOLATE MOFETIL 1500 MG: 500 TABLET ORAL at 08:51

## 2020-05-29 RX ADMIN — DOXYCYCLINE HYCLATE 100 MG: 100 CAPSULE ORAL at 08:52

## 2020-05-29 RX ADMIN — PANTOPRAZOLE SODIUM 40 MG: 40 TABLET, DELAYED RELEASE ORAL at 08:49

## 2020-05-29 RX ADMIN — ASPIRIN 81 MG: 81 TABLET, COATED ORAL at 08:51

## 2020-05-29 RX ADMIN — NYSTATIN 1000000 UNITS: 100000 SUSPENSION ORAL at 13:14

## 2020-05-29 RX ADMIN — ROSUVASTATIN CALCIUM 10 MG: 10 TABLET, FILM COATED ORAL at 08:52

## 2020-05-29 RX ADMIN — NYSTATIN 1000000 UNITS: 100000 SUSPENSION ORAL at 16:09

## 2020-05-29 RX ADMIN — TACROLIMUS 2 MG: 1 CAPSULE ORAL at 08:51

## 2020-05-29 RX ADMIN — Medication 1 TABLET: at 08:51

## 2020-05-29 RX ADMIN — NYSTATIN 1000000 UNITS: 100000 SUSPENSION ORAL at 08:49

## 2020-05-29 ASSESSMENT — ACTIVITIES OF DAILY LIVING (ADL)
ADLS_ACUITY_SCORE: 13

## 2020-05-29 ASSESSMENT — MIFFLIN-ST. JEOR: SCORE: 1609.63

## 2020-05-29 NOTE — PLAN OF CARE
D: Pt admitted 5/17, s/p open heart transplant 5/18. Pt has a past medical history of CAD s/p STEMI s/p stent (2018), CKD 3, PAF, DM2, ICM s/p LVAD (2018)      I/A: A&Ox4, VSS, afebrile, on RA with O2 sats 99% on RA. Denies pain, Sinus Rhythm/Sinus Tach on the tele monitor. Gave Kayexalate for elevated K+. No BM overnight. Up independent. Pleasant and cooperative with cares.     P: Potential discharge to [facility] today pending today's K+ level. Continue with current plant of care. Contact CVTS (Cards 2 consulting) for questions or concerns.

## 2020-05-29 NOTE — PROGRESS NOTES
[facility]   Cardiology II Service / Advanced Heart Failure  Daily Progress Note  Date of Service: 5/29/2020      Patient: Mariusz Sharp  MRN: [id number]  Admission Date: 5/17/2020  Hospital Day # 11    Assessment and Plan: Mariusz Sharp is a 59 year old male with PMH of CAD, LV thrombus, CKD Stage III, PAF, DM Type II, and ICM s/p HM III LVAD as BTT with subsequent OHT 5/18/20.     S/p OHT secondary to ICM. 5/18/20. Echo 5/23/20 with EF 65-70% and normal graft function.   RHC 5/24 with mRA-7, RV-30/6, mPA-24, mPCWP-15, AMRIT CO-5.12, AMRIT CI-2.62, biopsy negative.   Immunosuppression: Simulect 5/18 and 5/22. Tac increased to 2 mg po BID last HS, tac level < 3. Repeat Tac level Sunday. Goal-10-12. Cellcept 1500 mg po BID, and Prednisone taper.   Serostatus: CMV: D+, R-, EBV: D- R+, Toxo D- R-  Prophylaxis; Nystatin and Valcyte. Bactrim discontinued due to hyperkalemia. Pentamidine neb.   - Continue ASA and Crestor.   - Next RHC/boipsy due 6/1.     Leukocytosis. UA negative 5/18.   - WBC-12.3. Asymptomatic. Repeat Sunday.      Hyperkalemia. K 5.9 5/28. No evidence for peaked T waves on EKG.   - K 5.3 this AM following Kayexalate. Repeat BMP Sunday.  - He will discharge of Bactrim.        NSVT. Recurrent bursts NSVT with no hemodynamic compromise.   - Electrolytes optimized.    - RHC/biopsy negative 5/24.      P Acnes to LVAD outflow graft.   - Appreciate ID input.   - ID recommended discontinue Vanco and initiate Doxycycline 100 mg po BID through 6/1/20.     Donor positive S Anginosus and Veillonella. Questionable contaminant. Zosyn completed 5/22. Note Staph Anginosus sensitive to Ceftriaxone, Clindamycin, Erythromycin, PCN, and Vanco. Flagyl completed today. Vanco completed 5/27.   - Appreciate ID input.   - Continue Doxycycline 100 mg po BID through 6/1/20.     DM Type II.   - Management per Endocrinology.      Elevated PSA with Renal Nephrolithiasis.   - PSA-8.21. MRI consistent with BPH.   -  "Follow up with Urology.   ================================================================  Interval History/ROS: He is feeling great today. He denies fever, chills, oral ulcerations, chest pain, palpitations, cough, nausea, vomiting, diarrhea, rash, and LE edema. He is tolerating oral intake and ambulation well.     Last 24 hr care team notes reviewed.   ROS:  4 point ROS including Respiratory, CV, GI and , other than that noted in the HPI, is negative.     Medications: Reviewed in EPIC.     Physical Exam:   BP (!) 137/99 (BP Location: Left arm)   Pulse 94   Temp 97.7  F (36.5  C) (Oral)   Resp 18   Ht 1.626 m (5' 4\")   Wt 87.4 kg (192 lb 9.6 oz)   SpO2 98%   BMI 33.06 kg/m    GENERAL: Appears alert and oriented times three.   HEENT: Eye symmetrical and free of discharge bilaterally. Mucous membranes moist and without lesions.  NECK: Supple and without lymphadenopathy. JVD below clavicular line upright.   CV: RRR, S1S2 present without murmur, rub, or gallop.   RESPIRATORY: Respirations regular, even, and unlabored. Lungs CTA throughout.   GI: Soft and non distended with normoactive bowel sounds present in all quadrants. No tenderness, rebound, guarding. No organomegaly.   EXTREMITIES: No peripheral edema. 2+ bilateral pedal pulses.   NEUROLOGIC: Alert and orientated x 3. CN II-XII grossly intact. No focal deficits.   MUSCULOSKELETAL: No joint swelling or tenderness.   SKIN: No jaundice. No rashes or lesions.     Data:  CMP  Recent Labs   Lab 05/29/20  0557 05/28/20  1741 05/28/20  1139 05/28/20  0758 05/28/20  0547 05/27/20  0643 05/26/20  0518 05/25/20  0653  05/24/20  0647 05/23/20  0619  05/22/20  2156     --   --   --  135 137 138 134   < > 138 140   < >  --    POTASSIUM 5.3 5.6* 5.4* 5.5* 5.9* 5.1 4.6 5.2   < > 4.6 3.8   < >  --    CHLORIDE 108  --   --   --  108 110* 110* 108  --  110* 110*   < >  --    CO2 21  --   --   --  17* 21 20 20  --  20 22   < >  --    ANIONGAP 5  --   --   --  9 6 8 6 "  --  8 8   < >  --    *  --   --   --  218* 168* 63* 127*   < > 91 181*   < >  --    BUN 49*  --   --   --  45* 41* 41* 35*  --  36* 31*   < >  --    CR 1.46*  --  1.51*  --  1.49* 1.32* 1.42* 1.31*  --  1.36* 1.32*   < >  --    GFRESTIMATED 52*  --  50*  --  51* 59* 54* 60*  --  57* 59*   < >  --    GFRESTBLACK 61  --  58*  --  59* 69 63 69  --  66 69   < >  --    ARLENE 8.3*  --   --   --  8.2* 7.9* 8.2* 8.2*  --  8.3* 8.2*   < >  --    MAG  --   --   --   --   --  1.9 1.9 2.1  --  2.1 2.5*   < >  --    PHOS  --   --   --   --   --   --  4.1 3.5  --   --  2.9  --  1.8*   PROTTOTAL  --   --   --   --   --   --   --  5.3*  --   --   --   --   --    ALBUMIN  --   --   --   --   --   --   --  2.6*  --   --   --   --   --    BILITOTAL  --   --   --   --   --   --   --  0.7  --   --   --   --   --    ALKPHOS  --   --   --   --   --   --   --  33*  --   --   --   --   --    AST  --   --   --   --   --   --   --  23  --   --   --   --   --    ALT  --   --   --   --   --   --   --  47  --   --   --   --   --     < > = values in this interval not displayed.     CBC  Recent Labs   Lab 05/29/20  0557 05/28/20  0547 05/27/20  0643 05/26/20  0518   WBC 12.3* 11.9* 11.5* 11.3*   RBC 3.17* 3.35* 3.27* 3.15*   HGB 8.4* 8.9* 8.3* 8.1*   HCT 27.8* 29.5* 28.5* 27.2*   MCV 88 88 87 86   MCH 26.5 26.6 25.4* 25.7*   MCHC 30.2* 30.2* 29.1* 29.8*   RDW 17.9* 17.7* 17.8* 17.2*    365 335 295     Patient discussed with Dr. [place].      Antonia Byrne [facility]  5/29/2020

## 2020-05-29 NOTE — TELEPHONE ENCOUNTER
Post transplant discharge teaching done via phone with pt. Discussed when/how to reach coordinator.  Biopsy schedule/follow up care requirements reviewed with pt.  Medications reviewed, pharmacist review done by PharmD earlier this week.  Discussed driving/lifting restrictions, cardiac diet, CAV, immunizations, rejection infection prevention and denervation.  Pt encouraged to call with any questions or concerns.  Pt states understanding all instructions and how do reach coordinator if needed.

## 2020-05-29 NOTE — DISCHARGE INSTRUCTIONS
[facility]      AFTER YOU GO HOME FROM YOUR HEART SURGERY  (Heart Transplant/5/18/2020)    You had a sternotomy, avoid lifting anything greater than ten pounds for 6 weeks after surgery and then less than 20 pounds for an additional 6 weeks. Do not reach backwards or use arms to push out of chair. Do not let people pull on your arms to assist with standing. Avoid twisting or reaching too far across your body.  Avoid strenuous activities such as bowling, vacuuming, raking, shoveling, golf or tennis for 12 weeks after your surgery. It is okay to resume sex if you feel comfortable in doing so. You may have to try different positions with your partner.  Splint your chest incision by hugging a pillow or bringing your arms across your chest when coughing or sneezing.     No driving for 4 weeks after surgery or while on pain medication.     Shower or wash your incisions daily with soap and water (or as instructed), pat dry. Keep wound clean and dry, showers are okay after discharge, but don't let spray hit directly on incision. No baths or swimming for 1 month. Cover chest tube sites with gauze until they stop draining, then leave open to air. It is not abnormal for chest tube sites to drain yellowish/clear fluid for up to 2-3 weeks after surgery.     Watch for signs of infection: increased redness, tenderness, warmth or any drainage that appears infected (pus like) or is persistent.  Also a temperature > 100.5 F or chills. Call your surgeon or primary care provider's office immediately. Remove any skin glue left on incisions after 10-14 days. This will not affect your incision and can speed up healing.    Exercise is very important in your recovery. Please follow the guidelines set up for you in your cardiac rehab classes at the hospital. If outpatient cardiac rehab was ordered for you, we highly recommend you participate. If you have problems arranging your cardiac rehab, please call  "[phone] for all locations, with the exception of [place], please call [phone] and [place], please call [phone].    Avoid sitting for prolonged periods of time, try to walk every hour during the day. If you have a leg incision, elevate your leg often when you are not walking.    Check your weight when you get home from the hospital and continue to check it daily through your recovery for at least a month. If you notice a weight gain of 2-3 pounds in a week, notify your primary care physician, cardiologist or surgeon.    Bowel activity may be slow after surgery. If necessary, you may take an over the counter laxative such as Milk of Magnesia or Miralax. You may have stool softeners prescribed (docusate sodium, Senokot). We recommend using stool softeners while using narcotics for pain (oxycodone/percocet, hydrocodone/vicodin, hydromorphone/dilaudid).    Wean OFF of narcotics (oxycodone, dilaudid, hydrocodone) as soon as possible. You should continue taking acetaminophen as long as you have any surgical pain as the first choice for pain control and add narcotics as necessary for pain to be tolerable.      DENTAL VISITS AFTER SURGERY  If you have had your heart valve repaired or replaced, we do not recommend having any dental work done for 6 months and you will need to take an antibiotic prior to dental visits from now on.  Please notify your dentist before any procedure for the proper treatment needed. The antibiotic is taken by mouth one hour prior to visit. This includes routine cleanings.  You can sometimes hear a mechanical valve \"clicking,\" this is normal and not a sign of something wrong.    DO NOT SMOKE.  IF YOU NEED HELP QUITTING, PLEASE TALK WITH YOUR CARDIOLOGIST OR PRIMARY DOCTOR.    You had a heart transplant, so call your Transplant Coordinator with all questions or concerns.     REGARDING PRESCRIPTION REFILLS.  If you need a refill on your pain medication contact us to discuss your pain " and a possible one time refill.   All other medications will be adjusted, discontinued and re-filled by your primary care physician and/or your cardiologist as they were prior to your surgery. We have given you enough for one to three month with possibly one refill.    POST-OPERATIVE CLINIC VISITS  You will be notified of a follow up visit with CVTS Surgeon at the [facility].   Your transplant coordinator with arrange you Cardiology follow up, labs and diagnostics.     If you do not hear from a  in 7 days, please call [phone] (choose option 1) and request to be seen with a general cardiologist or someone that you have seen in the past.   If there is a need to return to see CT Surgery please call our  at [phone].    SURGICAL QUESTIONS  Please call Taylor Dey or Sammi Jacob with surgical recovery and medication questions, the phone number is listed below.  They can assist you with your needs and contact other surgery care team members as indicated.    On weekends or after hours, please call [phone] and ask the  to   page the Cardiothoracic Surgery fellow on call.      Thank you,    Your Cardiothoracic Surgery Team  Taylor Dey RN Care Coordinator-  [phone]   Sammi Jacob RN Care Coordinator-  [phone]  Christine Ohara CNP                                  Diet recommendations post-transplant: High protein diet x 8 weeks.  Heart healthy dietary habits long term (low saturated/trans fat, low sodium). Practice food safety precautions. See nutrition handout and food safety booklet for more information.

## 2020-05-29 NOTE — PROGRESS NOTES
Transplant Social Work Service Discharge Note      Patient Name:  Mariusz Sharp     Anticipated Discharge Date:  5/29/20    Discharge Disposition:   Other:  [place] [address] for the next 30 days with 24-hour caregiver-Brother    Plan for 24 hour care for immediate post transplant period: Brother will be the patient's primary caregiver 24 hours/day for the next 30 days    If not local, plans for short term stay:  Brother got into an apartment on Tuesday and patient being discharged there today    Additional Services/Equipment Arranged:  None     Persons notified of above discharge plan:  Patient and Brother    Patient / Family response to discharge plan:  Agreeable to the discharge plan    Education and resources provided by SW at discharge: role of transplant  in out patient setting and provided contact info for , availability of support groups, and encouraged him and Brother to continue social distancing once he discharges    Discussed anticipated pharmacy out of pocket costs: YES-patient has good coverage for medications    Provided [employer] Donor Letter Writing packet : YES    Plan: Red should discharge to the [place] apartment later today with Brother. No further SW needs identified at this time. He verbalizes awareness toward contacting writer if needs arise. He did ask that writer faxes verification of hospitalization to his LT disability provider. This was done earlier today.

## 2020-05-29 NOTE — PLAN OF CARE
DISCHARGE                         5/29/2020  5:33 PM  ----------------------------------------------------------------------------  Discharged to: [facility]  Via: Automobile  Accompanied by: Family  Discharge Instructions: diet, activity, medications, follow up appointments, when to call the MD, aftercare instructions, and to watchout for s/s of infection, increasing SOB, palpitations, and chest pain.  Prescriptions: To be filled by [facility] discharge pharmacy per pt's request; medication list reviewed & sent with pt  Follow Up Appointments: arranged; information given  Belongings: All sent with pt  IV: out  Telemetry: off  Pt exhibits understanding of above discharge instructions; all questions answered.    Discharge Paperwork: Signed, copied, and sent home with patient.

## 2020-05-29 NOTE — PLAN OF CARE
Discharge Planner OT   Patient plan for discharge: home with OP CR  Current status: Pt SBA for functional transfers and mobility. Pt completing standing sponge bath at sink with set up and SBA. Pt ambulating ~2000 ft in hallway with SBA. Pt talking throughout, reporting 2/10 effort on OMNI scale. Pt encouraged to ambulate in hallway outside of therapies. All VSS on RA throughout session.  Barriers to return to prior living situation: deconditioning, post surgical precautions, medical status  Recommendations for discharge: home with OP CR  Rationale for recommendations: Pt with good support at home for A as needed, pt to benefit from OP CR to maximize cardiopulmonary and functional outcomes       Entered by: Elisa Malagon 05/29/2020 9:01 AM

## 2020-05-29 NOTE — PROGRESS NOTES
Diabetes Consult Daily  Progress Note          Assessment/Plan:                          Mariusz Moon is a 59 year old male with PMHx TIIDM, HTN, HLP, ischemic heart disease s/p STEMI with ALYSSA 2019, LVAD placement, CKD III and obesity who was admitted 5/17/20 for heart transplant. He underwent heart transplant on 5/18/20 with Dr. Willis. Donor blood cuultures and respiratory cultures returned positive for H flu, staph and strep      Type II Diabetes Mellitus: uncontrolled (A1c 8.0%), with steroid induced hyperglycemia following heart transplant        Plan and plan for discharge:  -increased lantus from 35 units to 40 units at (1400)   -Novolog 1 unit per 7 grams of CHO for meals/snacks/supplements, increased to 1 unit per 5 grams of CHO   -Novolog high intensity sliding scale before meals and HS  -monitor glucose before meals, HS and 0200  -hypoglycemia protocol       Outpatient diabetes follow up: 2 weeks   Plan discussed with patient and primary team.           Interval History:   The last 24 hours progress and nursing notes reviewed.  Transitioned to lantus yesterday  Glcusoe 209 at ~ 0200 and am glcusoe 233  Increased basal insulin and made adjustment to I:C ratio while on steroids.    Appetite is reported as good, denies nausea and or vomiting  Reviewed discharge plan, all questions were answered. Was on basal bolus insulin with carbohydrate counting prior to transplant.     Steroid taper:  Prednisone 25 mg twice dialy starting 5/26 x 4 doses  Prednisone 20 mg twice daily ( starting 5/28 at 0800)    Recent Labs   Lab 05/29/20  0557 05/29/20  0218 05/28/20  2201 05/28/20  1816 05/28/20  1137 05/28/20  0741 05/28/20  0547 05/28/20  0344  05/27/20  0643  05/26/20  0518  05/25/20  0653  05/24/20  1137   *  --   --   --   --   --  218*  --   --  168*  --  63*  --  127*  --  86   BGM  --  209* 204* 229* 189* 196*  --  188*   < >  --    < >  --    < >  --    < >  --     < > = values  "in this interval not displayed.               Review of Systems:   See interval hx          Medications:     Orders Placed This Encounter      Regular Diet Adult       Physical Exam:  Gen: Alert, sitting in chair, NAD   HEENT: mucous membranes are moist  Resp: non-labored  Ext: moving all extremities   Neuro:oriented x3, communicating clearly  BP (!) 136/96 (BP Location: Left arm)   Pulse 94   Temp 97.8  F (36.6  C) (Oral)   Resp 16   Ht 1.626 m (5' 4\")   Wt 87.4 kg (192 lb 9.6 oz)   SpO2 99%   BMI 33.06 kg/m             Data:     Lab Results   Component Value Date    A1C 8.0 05/12/2020    A1C 9.5 07/05/2019    A1C 9.4 01/11/2019    A1C 10.1 12/30/2018    A1C 10.6 12/20/2018              CBC RESULTS:   Recent Labs   Lab Test 05/29/20  0557   WBC 12.3*   RBC 3.17*   HGB 8.4*   HCT 27.8*   MCV 88   MCH 26.5   MCHC 30.2*   RDW 17.9*        Recent Labs   Lab Test 05/29/20  0557 05/28/20  1741 05/28/20  1139  05/28/20  0547     --   --   --  135   POTASSIUM 5.3 5.6* 5.4*   < > 5.9*   CHLORIDE 108  --   --   --  108   CO2 21  --   --   --  17*   ANIONGAP 5  --   --   --  9   *  --   --   --  218*   BUN 49*  --   --   --  45*   CR 1.46*  --  1.51*  --  1.49*   ARLENE 8.3*  --   --   --  8.2*    < > = values in this interval not displayed.     Liver Function Studies -   Recent Labs   Lab Test 05/25/20  0653   PROTTOTAL 5.3*   ALBUMIN 2.6*   BILITOTAL 0.7   ALKPHOS 33*   AST 23   ALT 47     Lab Results   Component Value Date    INR 1.54 05/18/2020    INR 2.21 05/18/2020    INR 1.42 05/18/2020    INR 2.19 05/18/2020    INR 2.21 05/15/2020    INR 2.76 05/14/2020    INR 2.88 05/13/2020    INR 2.47 05/12/2020    INR 2.5 05/11/2020    INR 2.4 04/24/2020    INR 2.3 04/03/2020    INR 2.5 03/20/2020         I spent a total of 35 minutes bedside and on the inpatient unit managing the glycemic care of Mariusz Sharp. Over 50% of my time on the unit was spent counseling the patient  and/or coordinating care " regarding .  See note for details.      Shelley Vargas -[phone]  Diabetes Management job code [id number]

## 2020-06-01 ENCOUNTER — TELEPHONE (OUTPATIENT)
Dept: TRANSPLANT | Facility: CLINIC | Age: 60
End: 2020-06-01

## 2020-06-01 ENCOUNTER — DOCUMENTATION ONLY (OUTPATIENT)
Dept: ANTICOAGULATION | Facility: CLINIC | Age: 60
End: 2020-06-01

## 2020-06-01 ENCOUNTER — APPOINTMENT (OUTPATIENT)
Dept: MEDSURG UNIT | Facility: CLINIC | Age: 60
End: 2020-06-01
Attending: INTERNAL MEDICINE
Payer: COMMERCIAL

## 2020-06-01 ENCOUNTER — HOSPITAL ENCOUNTER (OUTPATIENT)
Facility: CLINIC | Age: 60
Discharge: HOME | End: 2020-06-01
Attending: INTERNAL MEDICINE | Admitting: INTERNAL MEDICINE
Payer: COMMERCIAL

## 2020-06-01 ENCOUNTER — APPOINTMENT (OUTPATIENT)
Dept: LAB | Facility: CLINIC | Age: 60
End: 2020-06-01
Attending: INTERNAL MEDICINE
Payer: COMMERCIAL

## 2020-06-01 VITALS
OXYGEN SATURATION: 98 % | BODY MASS INDEX: 31.92 KG/M2 | WEIGHT: 187 LBS | DIASTOLIC BLOOD PRESSURE: 98 MMHG | RESPIRATION RATE: 18 BRPM | TEMPERATURE: 98.1 F | HEIGHT: 64 IN | SYSTOLIC BLOOD PRESSURE: 157 MMHG

## 2020-06-01 DIAGNOSIS — Z94.1 HEART REPLACED BY TRANSPLANT (H): ICD-10-CM

## 2020-06-01 DIAGNOSIS — Z94.1 HEART REPLACED BY TRANSPLANT (H): Primary | ICD-10-CM

## 2020-06-01 DIAGNOSIS — Z94.1 STATUS POST HEART TRANSPLANTATION (H): ICD-10-CM

## 2020-06-01 DIAGNOSIS — I50.82 BIVENTRICULAR HEART FAILURE (H): ICD-10-CM

## 2020-06-01 DIAGNOSIS — Z95.811 LVAD (LEFT VENTRICULAR ASSIST DEVICE) PRESENT (H): ICD-10-CM

## 2020-06-01 DIAGNOSIS — Z11.59 ENCOUNTER FOR SCREENING FOR OTHER VIRAL DISEASES: Primary | ICD-10-CM

## 2020-06-01 LAB
ALBUMIN SERPL-MCNC: 3 G/DL (ref 3.4–5)
ALP SERPL-CCNC: 42 U/L (ref 40–150)
ALT SERPL W P-5'-P-CCNC: 40 U/L (ref 0–70)
ANION GAP SERPL CALCULATED.3IONS-SCNC: 10 MMOL/L (ref 3–14)
AST SERPL W P-5'-P-CCNC: 14 U/L (ref 0–45)
BILIRUB SERPL-MCNC: 0.7 MG/DL (ref 0.2–1.3)
BUN SERPL-MCNC: 66 MG/DL (ref 7–30)
CALCIUM SERPL-MCNC: 8.7 MG/DL (ref 8.5–10.1)
CHLORIDE SERPL-SCNC: 110 MMOL/L (ref 94–109)
CO2 SERPL-SCNC: 17 MMOL/L (ref 20–32)
CREAT SERPL-MCNC: 1.58 MG/DL (ref 0.66–1.25)
ERYTHROCYTE [DISTWIDTH] IN BLOOD BY AUTOMATED COUNT: 18.2 % (ref 10–15)
GFR SERPL CREATININE-BSD FRML MDRD: 48 ML/MIN/{1.73_M2}
GLUCOSE SERPL-MCNC: 214 MG/DL (ref 70–99)
HCT VFR BLD AUTO: 28.5 % (ref 40–53)
HGB BLD-MCNC: 8.6 G/DL (ref 13.3–17.7)
INR PPP: 1.16 (ref 0.86–1.14)
MAGNESIUM SERPL-MCNC: 1.6 MG/DL (ref 1.6–2.3)
MCH RBC QN AUTO: 26.1 PG (ref 26.5–33)
MCHC RBC AUTO-ENTMCNC: 30.2 G/DL (ref 31.5–36.5)
MCV RBC AUTO: 87 FL (ref 78–100)
PHOSPHATE SERPL-MCNC: 3.7 MG/DL (ref 2.5–4.5)
PLATELET # BLD AUTO: 268 10E9/L (ref 150–450)
POTASSIUM SERPL-SCNC: 4.5 MMOL/L (ref 3.4–5.3)
PROT SERPL-MCNC: 5.9 G/DL (ref 6.8–8.8)
RBC # BLD AUTO: 3.29 10E12/L (ref 4.4–5.9)
SODIUM SERPL-SCNC: 137 MMOL/L (ref 133–144)
TACROLIMUS BLD-MCNC: <3 UG/L (ref 5–15)
TME LAST DOSE: ABNORMAL H
WBC # BLD AUTO: 8.8 10E9/L (ref 4–11)

## 2020-06-01 PROCEDURE — C1894 INTRO/SHEATH, NON-LASER: HCPCS | Performed by: INTERNAL MEDICINE

## 2020-06-01 PROCEDURE — 27210794 ZZH OR GENERAL SUPPLY STERILE: Performed by: INTERNAL MEDICINE

## 2020-06-01 PROCEDURE — 88346 IMFLUOR 1ST 1ANTB STAIN PX: CPT | Performed by: INTERNAL MEDICINE

## 2020-06-01 PROCEDURE — 93505 ENDOMYOCARDIAL BIOPSY: CPT | Performed by: INTERNAL MEDICINE

## 2020-06-01 PROCEDURE — 36415 COLL VENOUS BLD VENIPUNCTURE: CPT | Performed by: INTERNAL MEDICINE

## 2020-06-01 PROCEDURE — 88350 IMFLUOR EA ADDL 1ANTB STN PX: CPT | Performed by: INTERNAL MEDICINE

## 2020-06-01 PROCEDURE — 25000125 ZZHC RX 250: Performed by: NURSE PRACTITIONER

## 2020-06-01 PROCEDURE — 99214 OFFICE O/P EST MOD 30 MIN: CPT | Performed by: NURSE PRACTITIONER

## 2020-06-01 PROCEDURE — 80053 COMPREHEN METABOLIC PANEL: CPT | Performed by: INTERNAL MEDICINE

## 2020-06-01 PROCEDURE — 80197 ASSAY OF TACROLIMUS: CPT | Performed by: INTERNAL MEDICINE

## 2020-06-01 PROCEDURE — 40000166 ZZH STATISTIC PP CARE STAGE 1

## 2020-06-01 PROCEDURE — 25000125 ZZHC RX 250: Performed by: INTERNAL MEDICINE

## 2020-06-01 PROCEDURE — 88307 TISSUE EXAM BY PATHOLOGIST: CPT | Performed by: INTERNAL MEDICINE

## 2020-06-01 PROCEDURE — 83735 ASSAY OF MAGNESIUM: CPT | Performed by: INTERNAL MEDICINE

## 2020-06-01 PROCEDURE — 85610 PROTHROMBIN TIME: CPT | Performed by: INTERNAL MEDICINE

## 2020-06-01 PROCEDURE — 84100 ASSAY OF PHOSPHORUS: CPT | Performed by: INTERNAL MEDICINE

## 2020-06-01 PROCEDURE — 40000275 ZZH STATISTIC RCP TIME EA 10 MIN

## 2020-06-01 PROCEDURE — 94642 AEROSOL INHALATION TREATMENT: CPT

## 2020-06-01 PROCEDURE — 85027 COMPLETE CBC AUTOMATED: CPT | Performed by: INTERNAL MEDICINE

## 2020-06-01 RX ORDER — LIDOCAINE 40 MG/G
CREAM TOPICAL
Status: COMPLETED | OUTPATIENT
Start: 2020-06-01 | End: 2020-06-01

## 2020-06-01 RX ORDER — LIDOCAINE 40 MG/G
CREAM TOPICAL
Status: CANCELLED | OUTPATIENT
Start: 2020-06-01

## 2020-06-01 RX ORDER — TACROLIMUS 1 MG/1
CAPSULE ORAL
Qty: 150 CAPSULE | Refills: 1 | Status: SHIPPED | OUTPATIENT
Start: 2020-06-01 | End: 2020-06-08

## 2020-06-01 RX ORDER — PENTAMIDINE ISETHIONATE 300 MG/300MG
300 INHALANT RESPIRATORY (INHALATION)
Status: COMPLETED | OUTPATIENT
Start: 2020-06-01 | End: 2020-06-01

## 2020-06-01 RX ADMIN — LIDOCAINE: 40 CREAM TOPICAL at 09:00

## 2020-06-01 RX ADMIN — PENTAMIDINE ISETHIONATE 300 MG: 300 INHALANT RESPIRATORY (INHALATION) at 11:31

## 2020-06-01 ASSESSMENT — MIFFLIN-ST. JEOR: SCORE: 1584.48

## 2020-06-01 NOTE — IP AVS SNAPSHOT
[facility], [facility],  Heart Cath Lab  [address] [address]  Phone:  [phone]                                    After Visit Summary   6/1/2020    Mariusz Sharp    MRN: [id number]           After Visit Summary Signature Page    I have received my discharge instructions, and my questions have been answered. I have discussed any challenges I see with this plan with the nurse or doctor.    ..........................................................................................................................................  Patient/Patient Representative Signature      ..........................................................................................................................................  Patient Representative Print Name and Relationship to Patient    ..................................................               ................................................  Date                                   Time    ..........................................................................................................................................  Reviewed by Signature/Title    ...................................................              ..............................................  Date                                               Time          22EPIC Rev 08/18

## 2020-06-01 NOTE — PROGRESS NOTES
"  Adult Heart Transplant Clinic  June 1, 2020    HPI:   \"Vasyl" Aron is a 59yr old male with a history of CAD (s/p STEMI with ALYSSA to LAD 6/27/18), ICM (LVEF 20-25%) s/p HM3 LVAD (12/31/18), monomorphic VT (s/p ICD with shocks x4 7/16/18), LV thrombus, CKD, elevated PSA, pAF, HTN, HL, and DMII, now s/p OHT 5/18/20, who is being evaluated on unit 2A for his routine 3-week post-transplant surveillance.    Initial biopsy 5/24 was negative for significant rejection.  Last RHC 5/24 showed normal biventricular filling pressures, with RA 7, mPA 24, PCW 15, and CI 2.6.  Last TTE 5/23 showed stable graft function, with LVEF 65-70% and normal RV size/function.    His post-operative course was c/b NSVT (with no hemodynamic compromise), leukocytosis with C Acnes growing from his former LVAD site (thought to be a contaminant, but treated with IV vanco --> po doxy through 6/1/20, 14d course total), and in the setting of donor blood growing S anginosus and Veillonella (also thought to be a contaminant, but treated with Vanco/zosyn --> Vanco/Flagyl for a total of 7 days).  He also had hyperkalemia, which improved following kayexalate and stopping bactrim.  He ultimately discharged 5/29.    Since discharge, he states that he feels well.  He has been ambulating, and states that he walked ~2.5 miles yesterday with no exertional concerns.  His breathing status has been good, and he denies SOB, PND, and orthopnea.  He is eating, with great appetite and denies nausea, vomiting, diarrhea, and constipation.  His weight is down ~5#, and was 187# today.  He denies fluid retention, and states that he can see the veins in his legs and feet now.  He states that his BPs have remained stable.  He otherwise denies chest pain, palpitations, dizziness, headaches, acute vision changes, fevers, chills, cough, sore throat, signs of bleeding, and purulent drainage from incisional sites.      Medications:   Reviewed in Epic.    ROS:  Constitutional: No " "fever, chills, or sweats. Weight loss.   ENT: No visual disturbance, ear ache, epistaxis, sore throat.   Allergies/Immunologic: Negative.   Respiratory: As per HPI.  Cardiovascular: As per HPI.   GI: As per HPI.  : No urinary frequency, dysuria, or hematuria.   Integument: Negative.   Psychiatric: Negative.   Neuro: As per HPI.  Endocrinology: Negative.   Musculoskeletal: As per HPI.    Physical Exam:   BP (!) 135/97 (BP Location: Right arm)   Temp 98.1  F (36.7  C) (Oral)   Resp 18   Ht 1.626 m (5' 4.02\")   Wt 84.8 kg (187 lb)   SpO2 99%   BMI 32.08 kg/m    GENERAL: Appears alert and oriented times three. Lying in bed, NAD.  HEENT: Eye symmetrical and free of discharge bilaterally. Mucous membranes moist and without lesions.  NECK: Supple and without lymphadenopathy. JVD negative when lying at 45 degrees.   CV: RRR, S1S2 present without murmur, rub, or gallop.   RESPIRATORY: Respirations regular, even, and unlabored. Lungs CTA throughout.   GI: Soft and non distended with normoactive bowel sounds present in all quadrants. No tenderness, rebound, guarding. No organomegaly.   EXTREMITIES: No LE edema. 2+ bilateral pedal pulses.   NEUROLOGIC: Alert and orientated x 3. CN II-XII grossly intact. No focal deficits.   MUSCULOSKELETAL: No joint swelling or tenderness.   SKIN: No jaundice. No rashes or lesions. Surgical incisions pink with edges well-approximated and no redness or tenderness.    Data:  CMP  Recent Labs   Lab 05/29/20  0557 05/28/20  1741 05/28/20  1139 05/28/20  0758 05/28/20  0547 05/27/20  0643 05/26/20  0518     --   --   --  135 137 138   POTASSIUM 5.3 5.6* 5.4* 5.5* 5.9* 5.1 4.6   CHLORIDE 108  --   --   --  108 110* 110*   CO2 21  --   --   --  17* 21 20   ANIONGAP 5  --   --   --  9 6 8   *  --   --   --  218* 168* 63*   BUN 49*  --   --   --  45* 41* 41*   CR 1.46*  --  1.51*  --  1.49* 1.32* 1.42*   GFRESTIMATED 52*  --  50*  --  51* 59* 54*   GFRESTBLACK 61  --  58*  --  59* " "69 63   ARLENE 8.3*  --   --   --  8.2* 7.9* 8.2*   MAG  --   --   --   --   --  1.9 1.9   PHOS  --   --   --   --   --   --  4.1     CBC  Recent Labs   Lab 05/29/20  0557 05/28/20  0547 05/27/20  0643 05/26/20  0518   WBC 12.3* 11.9* 11.5* 11.3*   RBC 3.17* 3.35* 3.27* 3.15*   HGB 8.4* 8.9* 8.3* 8.1*   HCT 27.8* 29.5* 28.5* 27.2*   MCV 88 88 87 86   MCH 26.5 26.6 25.4* 25.7*   MCHC 30.2* 30.2* 29.1* 29.8*   RDW 17.9* 17.7* 17.8* 17.2*    365 335 295       Assessment/Plan:   \"Red\" Aron is a 59yr old male with a history of CAD (s/p STEMI with ALYSSA to LAD 6/27/18), ICM (LVEF 20-25%) s/p HM3 LVAD (12/31/18), monomorphic VT (s/p ICD with shocks x4 7/16/18), LV thrombus, CKD, elevated PSA, pAF, HTN, HL, and DMII, now s/p OHT 5/18/20, who is being evaluated on unit 2A for his routine 3-week post-transplant surveillance.    NICM, s/p OHT 5/18/20  Initial biopsy 5/24 was negative for significant rejection.  Last RHC 5/24 showed normal biventricular filling pressures, with RA 7, mPA 24, PCW 15, and CI 2.6.  Last TTE 5/23 showed stable graft function, with LVEF 65-70% and normal RV size/function.    His post-operative course was c/b NSVT (with no hemodynamic compromise), leukocytosis with C Acnes growing from his former LVAD site (thought to be a contaminant, but treated with IV vanco --> po doxy through 6/1/20, 14d course total), and in the setting of donor blood growing S anginosus and Veillonella (also thought to be a contaminant, but treated with Vanco/zosyn --> Vanco/Flagyl for a total of 7 days).  He also had hyperkalemia, which improved following kayexalate and stopping bactrim.  He ultimately discharged 5/29.    Labs today show stable electrolytes, kidney function, liver function, and blood counts.  Tacro level in process.    Mr. Sharp has continued to progress following his transplant.  His weight is down, and he does not appear hypervolemic.  He will have a RHC today, so informed him that we will let him " "know if he requires any diuresis pending the results.    His BPs today are slightly elevated, with dBPs 90s.  He does not have his transplant book nor any documentation of his BPs, but states that his BPs have been \"stable.\"  Will continue to monitor, and asked that he call if BPs persist > 140/90.      Serostatus:  - CMV D+/R-  - EBV D-/R+  - Toxo D-/R-    Immunosuppression:  - Received Simulect 5/18 and 5/22  - MMF 1500mg twice daily  - tacro, goal level 10-12.  Level today in process.  - prednisone taper, currently on 20mg twice daily    Graft function:  - BP:  Controlled, not on antihypertensives  - HRs:  Remain > 80  - fluid status:  Appears euvolemic, not on diuretics.  Will re-eval pending RHC today.    PPx:  - CAV:  Aspirin 81mg daily and crestor 10mg daily  - PCP:  Bactrim stopped 5/27 due to hyperkalemia , needs pentamidine inhalation --> ordered for today, and confirmed that staff on 2A will administer.  - CMV:  valcyte 450mg daily (renally dosed), x6 months (through 11/2020)  - Thrush:  Nystatin QID  - Osteoporosis:  Calcium/vitamin D supplements  - GI:  Pantoprazole 40mg daily    Health maintenance:  - week #2 RHC/bx today  - scheduled for week #3 surveillance Monday, 6/8  - needs referral to CR  - needs referral to urology      Hyperkalemia, resolved  K has normalized since receiving kayexalate and stopping bactrim.  Will continue to trend per protocol.    NSVT, resolved  Postoperative, with no hemodynamic compromise.    Leukocytosis  C Acnes growing from his former LVAD site  thought to be a contaminant, but treated with IV vanco --> po doxy through 6/1/20 (14d course total).  Donor blood also grew S anginosus and Veillonella (also thought to be a contaminant), but treated with Vanco/zosyn --> Vanco/Flagyl for a total of 7 days.    Elevated PSA  Prostate MRI showed signs of BPH.  Needs referral to urology.          The above was reviewed with Mr. Sharp, who verbalized understanding and will call with " further questions/concerns.    30 minutes spent face-to-face with patient, with >50% in counseling and/or coordination of care as described above.      Karen Benson DNP, FNP-BC, CHFN  Advanced Heart Failure Nurse Practitioner  [facility]          Karen Benson DNP, FNP-BC, CHFN  Advanced Heart Failure Nurse Practitioner  [facility]

## 2020-06-01 NOTE — PROGRESS NOTES
Patient tolerated recovery stage well. VSS, right neck site clean/dry/intact, no hematoma, and denies pain. Teaching was done and discharge instructions were given. Patient ambulated.Pt also had a pentamadine neb before he left to go home. Patient discharged from the hospital.

## 2020-06-01 NOTE — DISCHARGE INSTRUCTIONS
[facility]                        Interventional Cardiology  Discharge Instructions   Post Right Heart Cath      AFTER YOU GO HOME:    DO drink plenty of fluids    DO resume your regular diet and medications unless otherwise instructed by your Primary Physician    Do Not scrub the procedure site vigorously    No lotion or powder to the puncture site for 3 days    CALL YOUR PRIMARY PHYSICIAN IF: You may resume all normal activity.  Monitor neck site for bleeding, swelling, or voice changes. If you notice bleeding or swelling immediately apply pressure to the site and call number below to speak with Cardiology Fellow.  If you experience any changes in your breathing you should call your doctor immediately or come to the closest Emergency Department.  Do not drive yourself.    ADDITIONAL INSTRUCTIONS: Medications: You are to resume all home medications including anticoagulation therapy unless otherwise advised by your primary cardiologist or nurse coordinator.    Follow Up: Per your primary cardiology team    If you have any questions or concerns regarding your procedure site please call [phone] at anytime and ask for Cardiology Fellow on call.  They are available 24 hours a day.  You may also contact the Cardiology Clinic after hours number at [phone].                                                       Telephone Numbers [phone] Monday-Friday 8:00 am to 4:30 pm    [phone] After 4:30 pm Monday-Friday, Weekends & Holidays  Ask for Interventional Cardiologist on call. Someone is on call 24 hours/day   [facility] toll free number [phone] Monday-Friday 8:00 am to 4:30 pm   [facility] Emergency Dept [phone]

## 2020-06-01 NOTE — IP AVS SNAPSHOT
MRN:[id number]                      After Visit Summary   6/1/2020    Mariusz Sharp    MRN: [id number]           Visit Information        Department      6/1/2020  7:37 AM [facility], [place],  Heart Cath Lab          Review of your medicines      UNREVIEWED medicines. Ask your doctor about these medicines       Dose / Directions   acetaminophen 325 MG tablet  Commonly known as:  TYLENOL  Used for:  Status post heart transplantation (H)      Dose:  650 mg  Take 2 tablets (650 mg) by mouth every 4 hours as needed for other (multimodal surgical pain management along with NSAIDS and opioid medication as indicated based on pain control and physical function.)  Quantity:     Refills:  0     aspirin 81 MG chewable tablet  Commonly known as:  ASA  Used for:  LVAD (left ventricular assist device) present (H)      Dose:  81 mg  Take 1 tablet (81 mg) by mouth daily  Quantity:  90 tablet  Refills:  3     calcium carbonate 600 mg-vitamin D 400 units 600-400 MG-UNIT per tablet  Commonly known as:  CALTRATE  Used for:  Status post heart transplantation (H)      Dose:  1 tablet  Take 1 tablet by mouth 2 times daily (with meals)  Quantity:     Refills:  0     doxycycline hyclate 100 MG capsule  Commonly known as:  VIBRAMYCIN  Indication:  POSSIBLE POST-OP INFECTION  Used for:  Status post heart transplantation (H)      Dose:  100 mg  Take 1 capsule (100 mg) by mouth every 12 hours  Quantity:  60 capsule  Refills:  0     * insulin aspart 100 UNIT/ML pen  Commonly known as:  NovoLOG PEN  Used for:  Status post heart transplantation (H)      Correction Scale - HIGH INSULIN RESISTANCE DOSING     Do Not give Correction Insulin if Pre-Meal BG less than 140.   For Pre-Meal  - 164 give 1 unit.   For Pre-Meal  - 189 give 2 units.   For Pre-Meal  - 214 give 3 units.   For Pre-Meal  - 239 give 4 units.   For Pre-Meal  - 264 give 5 units.   For Pre-Meal  - 289 give 6 units.   For Pre-Meal BG  290 - 314 give 7 units.   For Pre-Meal  - 339 give 8 units.   For Pre-Meal  - 364 give 9 units.   For Pre-Meal BG greater than or equal to 365 give 10 units  To be given with prandial insulin, and based on pre-meal blood glucose.   Notify provider if glucose greater than or equal to 350 mg/dL after administration of correction dose. If given at mealtime, administer within 30 minutes of start of meal  Quantity:  3 mL  Refills:  0     * insulin aspart 100 UNIT/ML pen  Commonly known as:  NovoLOG PEN  Used for:  Status post heart transplantation (H)      Dose:  1-7 Units  Inject 1-7 Units Subcutaneous At Bedtime HIGH INSULIN RESISTANCE DOSING    Do Not give Bedtime Correction Insulin if BG less than 200.   For  - 224 give 1 units.   For  - 249 give 2 units.   For  - 274 give 3 units.   For  - 299 give 4 units.   For  - 324 give 5 units.   For  - 349 give 6 units.   For BG greater than or equal to 350 give 7 units.   Notify provider if glucose greater than or equal to 350 mg/dL after administration of correction dose. If given at mealtime, administer within 30 minutes of start of meal  Quantity:  3 mL  Refills:  0     * insulin aspart 100 UNIT/ML pen  Commonly known as:  NovoLOG PEN  Used for:  Status post heart transplantation (H)      DOSE:  1 units per 5 grams of carbohydrate.  Quantity:  3 mL  Refills:  0     insulin glargine 100 UNIT/ML pen  Commonly known as:  LANTUS PEN  Used for:  Status post heart transplantation (H)      Dose:  40 Units  Inject 40 Units Subcutaneous every 24 hours  Quantity:  3 mL  Refills:  0     mycophenolate 500 MG tablet  Commonly known as:  GENERIC EQUIVALENT  Used for:  Status post heart transplantation (H)      Dose:  1,500 mg  Take 3 tablets (1,500 mg) by mouth 2 times daily  Quantity:  96 tablet  Refills:  1     nystatin 150593 UNIT/ML suspension  Commonly known as:  MYCOSTATIN  Used for:  Status post heart transplantation (H)      Dose:   1,000,000 Units  Take 10 mLs (1,000,000 Units) by mouth 4 times daily  Quantity:  1240 mL  Refills:  1     pantoprazole 40 MG EC tablet  Commonly known as:  PROTONIX  Used for:  Status post heart transplantation (H)      Dose:  40 mg  Take 1 tablet (40 mg) by mouth every morning (before breakfast)  Quantity:  30 tablet  Refills:  1     predniSONE 10 MG tablet  Commonly known as:  DELTASONE  Used for:  Status post heart transplantation (H)      Dose:  20 mg  Take 2 tablets (20 mg) by mouth 2 times daily (with meals) Or as directed by transplant team  Quantity:  120 tablet  Refills:  0     rosuvastatin 10 MG tablet  Commonly known as:  CRESTOR  Used for:  Status post heart transplantation (H)      Dose:  10 mg  Take 1 tablet (10 mg) by mouth daily  Quantity:  30 tablet  Refills:  0     * tacrolimus 1 MG capsule  Commonly known as:  GENERIC EQUIVALENT  Used for:  Status post heart transplantation (H)      For a total of 2 mg BID or as directed by transplant team  Quantity:  120 capsule  Refills:  0     * tacrolimus 0.5 MG capsule  Commonly known as:  GENERIC EQUIVALENT  Used for:  Status post heart transplantation (H)      Total of 2 mg BID or as directed by transplant team  Quantity:  120 capsule  Refills:  0     valGANciclovir 450 MG tablet  Commonly known as:  VALCYTE  Indication:  Medication Treatment to Prevent Cytomegalovirus Disease  Used for:  Status post heart transplantation (H)      Dose:  450 mg  Take 1 tablet (450 mg) by mouth daily  Quantity:  30 tablet  Refills:  0         * This list has 5 medication(s) that are the same as other medications prescribed for you. Read the directions carefully, and ask your doctor or other care provider to review them with you.            CONTINUE these medicines which have NOT CHANGED       Dose / Directions   Accu-Chek Denise Plus test strip  Generic drug:  blood glucose      Refills:  0     BD SILVANA U/F 32G X 4 MM miscellaneous  Generic drug:  insulin pen needle       Refills:  0     melatonin 1 MG Tabs tablet  Used for:  Status post heart transplantation (H)      Dose:  1 mg  Take 1 tablet (1 mg) by mouth nightly as needed for sleep  Quantity:     Refills:  0              Protect others around you: Learn how to safely use, store and throw away your medicines at www.disposemymeds.org.       Follow-ups after your visit       Your next 10 appointments already scheduled    Jun 01, 2020  Pro[facility]  [facility] ([facility]) [address] [place] [id number]  [phone]   The [facility] is located on the corner of [address] [facility] on the [facility]. It is easily accessible from virtually any point in the [facility] area, via [address] and [address].   Jun 02, 2020  9:00 AM CDT  TELEMEDICINE with Xavi Mckeon Counts include 234 beds at the [facility] Medication Therapy Management ([facility]) [address] [place] [address]  3rd [facility] [id number] [phone]   [facility] Medication Therapy Management  Note: this is not an onsite visit; there is no need to come to the facility.  Please have a list of all current medications available for appointment.      Jun 05, 2020  3:30 PM CDT  Telephone Visit with Megan Ibarra MD  [facility]rology and Inst for Prostate and Urologic Cancers ([facility]) [address] [place] [address]  4th [facility] [id number] [phone]   [facility] for Prostate and Urologic Cancers  Note: this is not an onsite visit; there is no need to come to the facility.  Please have a list of all current medications available for appointment.      Jun 12, 2020  8:30 AM CDT  Echo Complete with UGIANNA  Eliecer LYNNE, Cardiac Services ([facility]) [address] [place] [address]  [phone]  [phone]   1. Please bring or wear a comfortable two-piece outfit.  2. You may eat, drink and take your normal medicines.  3. Please do not apply perfumes or lotions on the day of your exam.   4. For any questions that cannot be answered, please contact the ordering physician     Jun 12, 2020  9:30 AM CDT  XR CHEST 2 VIEWS with UUXR3  [facility]  [facility] ([facility]) [address] [facility] [id number] [phone]   How do I prepare for my exam? (Food and drink instructions)  No Food and Drink Restrictions.    How do I prepare for my exam? (Other instructions)  You do not need to do anything special for this exam.    What should I wear: Wear comfortable clothes.    How long does the exam take: Most scans take less than 5 minutes.    What should I bring: Bring a list of your medicines, including vitamins, minerals and over-the-counter drugs. It is safest to leave personal items at home.    Do I need a :  No  is needed.    What do I need to tell my doctor: Tell your doctor if there s any chance you are pregnant.    What should I do after the exam: No restrictions, You may resume normal activities.    What is this test: An image of a specific body part shown in shades of black and white.    Who should I call with questions: If you have any questions, please call the Imaging Department where you will have your exam. Directions, parking instructions, and other information is available on our website, [email or web address].     Jun 12, 2020 10:00 AM CDT  LAB with JACQUIE LAB GOLD WAITING  [facility] ([facility]) [address] [facility] [address]   Please do not eat 10-12 hours before your appointment if you are coming in fasting for labs on lipids, cholesterol, or glucose (sugar). Does not apply to pregnant women. Water, tea and black coffee  (with nothing added) is okay. Do not drink other fluids, diet soda or gum. If you have concerns about taking your medications, please send a message by clicking on Secure Messaging, Message Your Care Team.     Jun 12, 2020 10:30 AM CDT  Procedure 1.5 hr with U2A ROOM 3  Unit 2A [facility] ([facility]) [address] [facility] [address]      Jun 12, 2020  Procedure with Khris Mcclelland MD  [facility]  [facility] ([facility]) [address] [address]  [phone]   The [facility] is located on the corner of [facility] [address] [facility] on the [facility]. It is easily accessible from virtually any point in the [facility] area, via [address] and [address].   Jun 26, 2020  9:30 AM CDT  LAB with [facility] LAB GOLD [facility] ([facility]) [address] [facility] [address]   Please do not eat 10-12 hours before your appointment if you are coming in fasting for labs on lipids, cholesterol, or glucose (sugar). Does not apply to pregnant women. Water, tea and black coffee (with nothing added) is okay. Do not drink other fluids, diet soda or gum. If you have concerns about taking your medications, please send a message by clicking on Secure Messaging, Message Your Care Team.     Jun 26, 2020 10:00 AM CDT  Procedure - 2.5 hour with U2A ROOM 7  Unit 2A [facility] ([facility]) [address] [address]         Care Instructions       Further instructions from your care team       [facility]                        Interventional Cardiology  Discharge Instructions   Post Right Heart Cath      AFTER YOU GO HOME:    DO drink plenty of fluids    DO resume your regular diet  and medications unless otherwise instructed by your Primary Physician    Do Not scrub the procedure site vigorously    No lotion or powder to the puncture site for 3 days    CALL YOUR PRIMARY PHYSICIAN IF: You may resume all normal activity.  Monitor neck site for bleeding, swelling, or voice changes. If you notice bleeding or swelling immediately apply pressure to the site and call number below to speak with Cardiology Fellow.  If you experience any changes in your breathing you should call your doctor immediately or come to the closest Emergency Department.  Do not drive yourself.    ADDITIONAL INSTRUCTIONS: Medications: You are to resume all home medications including anticoagulation therapy unless otherwise advised by your primary cardiologist or nurse coordinator.    Follow Up: Per your primary cardiology team    If you have any questions or concerns regarding your procedure site please call [phone] at anytime and ask for Cardiology Fellow on call.  They are available 24 hours a day.  You may also contact the Cardiology Clinic after hours number at [phone].                                                       Telephone Numbers [phone] Monday-Friday 8:00 am to 4:30 pm    [phone] After 4:30 pm Monday-Friday, Weekends & Holidays  Ask for Interventional Cardiologist on call. Someone is on call 24 hours/day   [facility] toll free number [phone] Monday-Friday 8:00 am to 4:30 pm   [facility] Emergency Dept [phone]                   Additional Information About Your Visit       [employer] Information    [facility] gives you secure access to your electronic health record. If you see a primary care provider, you can also send messages to your care team and make appointments. If you have questions, please call your primary care clinic.  If you do not have a primary care provider, please call [phone] and they will assist you.       Care EveryWhere ID    This is your Care EveryWhere ID.  "This could be used by other organizations to access your [facility] medical records  [id number]       Your Vitals Were  Most recent update: 6/1/2020  8:49 AM    Blood Pressure   135/97   (BP Location: Right arm)          Temperature   98.1  F (36.7  C) (Oral)          Respirations   18          Height   1.626 m (5' 4.02\")          Weight   84.8 kg (187 lb)             Pulse Oximetry   99%    BMI (Body Mass Index)   32.08 kg/m           Primary Care Provider Office Phone # Fax #    Milad Fry [phone]      Equal Access to Services    [facility]: Hadii can weaver hadasho Soomaali, waaxda luqadaha, qaybta kaalmada adeegyada, mynor bar . So [facility] [phone].    ATENCIÓN: Si habla español, tiene a waddell disposición servicios gratuitos de asistencia lingüística. [facility] [phone].    We comply with applicable federal and state civil rights laws, including the [place] Human Rights Act. We do not discriminate on the basis of race, color, creed, Bahai, national origin, marital status, age, disability, sex, sexual orientation, or gender identity.       Thank you!    Thank you for choosing [facility] for your care. Our goal is always to provide you with excellent care. Hearing back from our patients is one way we can continue to improve our services. Please take a few minutes to complete the written survey that you may receive in the mail after you visit with us. Thank you!            Medication List      Medications          Morning Afternoon Evening Bedtime As Needed    Accu-Chek Denise Plus test strip  Generic drug:  blood glucose                     BD SILVANA U/F 32G X 4 MM miscellaneous  Generic drug:  insulin pen needle                     melatonin 1 MG Tabs tablet  INSTRUCTIONS:  Take 1 tablet (1 mg) by mouth nightly as needed for sleep                       ASK your doctor about these medications          Morning Afternoon Evening Bedtime As Needed    " acetaminophen 325 MG tablet  Also known as:  TYLENOL  INSTRUCTIONS:  Take 2 tablets (650 mg) by mouth every 4 hours as needed for other (multimodal surgical pain management along with NSAIDS and opioid medication as indicated based on pain control and physical function.)                     aspirin 81 MG chewable tablet  Also known as:  ASA  INSTRUCTIONS:  Take 1 tablet (81 mg) by mouth daily                     calcium carbonate 600 mg-vitamin D 400 units 600-400 MG-UNIT per tablet  Also known as:  CALTRATE  INSTRUCTIONS:  Take 1 tablet by mouth 2 times daily (with meals)                     doxycycline hyclate 100 MG capsule  Also known as:  VIBRAMYCIN  INSTRUCTIONS:  Take 1 capsule (100 mg) by mouth every 12 hours  Reason for med:  POSSIBLE POST-OP INFECTION                     * insulin aspart 100 UNIT/ML pen  Also known as:  NovoLOG PEN  INSTRUCTIONS:  Correction Scale - HIGH INSULIN RESISTANCE DOSING     Do Not give Correction Insulin if Pre-Meal BG less than 140.   For Pre-Meal  - 164 give 1 unit.   For Pre-Meal  - 189 give 2 units.   For Pre-Meal  - 214 give 3 units.   For Pre-Meal  - 239 give 4 units.   For Pre-Meal  - 264 give 5 units.   For Pre-Meal  - 289 give 6 units.   For Pre-Meal  - 314 give 7 units.   For Pre-Meal  - 339 give 8 units.   For Pre-Meal  - 364 give 9 units.   For Pre-Meal BG greater than or equal to 365 give 10 units  To be given with prandial insulin, and based on pre-meal blood glucose.   Notify provider if glucose greater than or equal to 350 mg/dL after administration of correction dose. If given at mealtime, administer within 30 minutes of start of meal                     * insulin aspart 100 UNIT/ML pen  Also known as:  NovoLOG PEN  INSTRUCTIONS:  Inject 1-7 Units Subcutaneous At Bedtime HIGH INSULIN RESISTANCE DOSING    Do Not give Bedtime Correction Insulin if BG less than 200.   For  - 224 give 1 units.   For  -  249 give 2 units.   For  - 274 give 3 units.   For  - 299 give 4 units.   For  - 324 give 5 units.   For  - 349 give 6 units.   For BG greater than or equal to 350 give 7 units.   Notify provider if glucose greater than or equal to 350 mg/dL after administration of correction dose. If given at mealtime, administer within 30 minutes of start of meal                     * insulin aspart 100 UNIT/ML pen  Also known as:  NovoLOG PEN  INSTRUCTIONS:  DOSE:  1 units per 5 grams of carbohydrate.                     insulin glargine 100 UNIT/ML pen  Also known as:  LANTUS PEN  INSTRUCTIONS:  Inject 40 Units Subcutaneous every 24 hours  Doctor's comments:  If Lantus is not covered by insurance, may substitute Basaglar at same dose and frequency.                       mycophenolate 500 MG tablet  Also known as:  GENERIC EQUIVALENT  INSTRUCTIONS:  Take 3 tablets (1,500 mg) by mouth 2 times daily                     nystatin 342828 UNIT/ML suspension  Also known as:  MYCOSTATIN  INSTRUCTIONS:  Take 10 mLs (1,000,000 Units) by mouth 4 times daily                     pantoprazole 40 MG EC tablet  Also known as:  PROTONIX  INSTRUCTIONS:  Take 1 tablet (40 mg) by mouth every morning (before breakfast)                     predniSONE 10 MG tablet  Also known as:  DELTASONE  INSTRUCTIONS:  Take 2 tablets (20 mg) by mouth 2 times daily (with meals) Or as directed by transplant team                     rosuvastatin 10 MG tablet  Also known as:  CRESTOR  INSTRUCTIONS:  Take 1 tablet (10 mg) by mouth daily                     * tacrolimus 1 MG capsule  Also known as:  GENERIC EQUIVALENT  INSTRUCTIONS:  For a total of 2 mg BID or as directed by transplant team                     * tacrolimus 0.5 MG capsule  Also known as:  GENERIC EQUIVALENT  INSTRUCTIONS:  Total of 2 mg BID or as directed by transplant team                     valGANciclovir 450 MG tablet  Also known as:  VALCYTE  INSTRUCTIONS:  Take 1 tablet (450  mg) by mouth daily  Reason for med:  Medication Treatment to Prevent Cytomegalovirus Disease                        * This list has 5 medication(s) that are the same as other medications prescribed for you. Read the directions carefully, and ask your doctor or other care provider to review them with you.

## 2020-06-01 NOTE — TELEPHONE ENCOUNTER
Called pt to check in, and review results from today's cath lab visit.  BMP, CBC baseline for pt.  Tacrolimus level remains low <3.  Plan for pt to take 3mg of Tacrolimus this PM and then take 3mg in the AM and 2mg in the PM Tue and Wed.  Recheck a Tacrolimus level this Thursday AM.  Also clarified with pt that today is his last dose of Doxycycline per discharge notes.  Pt states understanding instructions and plan of care.  Pt encouraged to call with any questions or concerns.

## 2020-06-01 NOTE — PLAN OF CARE
Occupational Therapy Discharge Summary    Reason for therapy discharge:    Discharged to home.    Progress towards therapy goal(s). See goals on Care Plan in [facility] electronic health record for goal details.  Goals partially met.  Barriers to achieving goals:   discharge from facility.    Therapy recommendation(s):    Continued therapy is recommended.  Rationale/Recommendations:  OP CR recommended to maximize cardiopulmonary and functional outcomes.

## 2020-06-02 ENCOUNTER — TELEPHONE (OUTPATIENT)
Dept: TRANSPLANT | Facility: CLINIC | Age: 60
End: 2020-06-02

## 2020-06-02 ENCOUNTER — ALLIED HEALTH/NURSE VISIT (OUTPATIENT)
Dept: PHARMACY | Facility: CLINIC | Age: 60
End: 2020-06-02

## 2020-06-02 ENCOUNTER — TELEPHONE (OUTPATIENT)
Dept: ENDOCRINOLOGY | Facility: CLINIC | Age: 60
End: 2020-06-02

## 2020-06-02 ENCOUNTER — ANCILLARY PROCEDURE (OUTPATIENT)
Dept: CT IMAGING | Facility: CLINIC | Age: 60
End: 2020-06-02
Attending: UROLOGY
Payer: COMMERCIAL

## 2020-06-02 DIAGNOSIS — Z94.1 STATUS POST HEART TRANSPLANTATION (H): ICD-10-CM

## 2020-06-02 DIAGNOSIS — Z11.59 ENCOUNTER FOR SCREENING FOR OTHER VIRAL DISEASES: Primary | ICD-10-CM

## 2020-06-02 DIAGNOSIS — Z94.1 HEART REPLACED BY TRANSPLANT (H): Primary | ICD-10-CM

## 2020-06-02 DIAGNOSIS — R52 PAIN: ICD-10-CM

## 2020-06-02 DIAGNOSIS — R73.9 HYPERGLYCEMIA: ICD-10-CM

## 2020-06-02 DIAGNOSIS — N20.2 RENAL AND URETERIC CALCULUS: ICD-10-CM

## 2020-06-02 DIAGNOSIS — Z94.1 STATUS POST HEART TRANSPLANTATION (H): Primary | ICD-10-CM

## 2020-06-02 LAB — COPATH REPORT: NORMAL

## 2020-06-02 PROCEDURE — 99607 MTMS BY PHARM ADDL 15 MIN: CPT | Performed by: PHARMACIST

## 2020-06-02 PROCEDURE — 99605 MTMS BY PHARM NP 15 MIN: CPT | Performed by: PHARMACIST

## 2020-06-02 RX ORDER — PREDNISONE 10 MG/1
TABLET ORAL
Qty: 120 TABLET | Refills: 0 | Status: SHIPPED | OUTPATIENT
Start: 2020-06-02 | End: 2020-06-15

## 2020-06-02 NOTE — TELEPHONE ENCOUNTER
[facility] Call Center    Phone Message    May a detailed message be left on voicemail: yes     Reason for Call: Other: Pt is being referred by Dr Jenni Ortiz for a hospital follow up for type 2 diabetes s/p heart transplant. Staff msg states she wants pt seen within 14 days, thanks!     Action Taken: Message routed to:  [facility] ([facility] Endocrine    Travel Screening: Not Applicable

## 2020-06-02 NOTE — TELEPHONE ENCOUNTER
Pt discharged 5/29/2020 post OHT.   Patient chooses to receive medications from  specialty pharmacy.  Updated pharmacy patient call list. Notified pharmacy.      LEROY Lauren.Ph.  [facility]  [phone] pager

## 2020-06-02 NOTE — PATIENT INSTRUCTIONS
Recommendations from today's MTM visit:                                                      1. [facility] mail order will call you three weeks post discharge. If you are running low on any medications before then, call the number on the back of the pill box. ([phone])    It was great to speak with you today.  I value your experience and would be very thankful for your time with providing feedback on our clinic survey. You may receive a survey via email or text message in the next few days.     Next MTM visit: 3 months    To schedule another MTM appointment, please call the clinic directly or you may call the MTM scheduling line at [phone] or toll-free at [phone].     My Clinical Pharmacist's contact information:                                                      It was a pleasure talking with you today!  Please feel free to contact me with any questions or concerns you have.      Xavi Mckeon, PharmD  [facility] Pharmacist    Phone: [phone]

## 2020-06-02 NOTE — PROGRESS NOTES
MTM ENCOUNTER  SUBJECTIVE/OBJECTIVE:                           Mariusz Sharp is a 59 year old male called for a transitions of care visit. He was discharged from [facility] on 5/29/20 for Heart Transplant.      Patient consented to a telehealth visit: yes  Telemedicine Visit Details  Type of service:  Telephone visit  Start Time: 9:10  End Time: 9:50  Originating Location (pt. Location): Home  Distant Location (provider location):  [facility] MEDICATION THERAPY MANAGEMENT  Mode of Communication:  Telephone    Chief Complaint: Initial post txp med review. When he was supposed to stop doxy? Per chart 6/1. Patient wanted to double check this was the right.     Allergies/ADRs: Reviewed in EHR  Tobacco:  reports that he has never smoked. He has never used smokeless tobacco.  Alcohol: not currently using  Caffeine: 1 cups/day of coffee, 1 sodas/day  PMH: Reviewed in EHR    Medication Adherence/Access: Patient uses pill box(es) and uses reminders/alarms.  Patient takes medications 3 time(s) per day. Before breakfast, 9am and 9pm  Per patient, misses medication 0 times per week.   Medication barriers: none.   The patient fills medications at [place]: YES.  Refills: discussed  Stools: normal stools     Heart Transplant:  Current immunosuppressants include TAC 3mg qAM & 2mg qPM and MMF 1500mg BID, Prednisone 20mg BID.  Pt reports no side effects  Transplant date: 5/18/20  Estimated Creatinine Clearance: 50.6 mL/min (A) (based on SCr of 1.58 mg/dL (H)).  CMV prophylaxis: CrCl 40 to 59 mL/minute: Valcyte 450 mg once daily Donor (+), Recipient (-), treat 6 months post tx   PCP prophylaxis: Inhaled Pentamidine 300mg q 4 weeks, 6/1/20, stopped bactrim due to hyperkalemia.   Antifungal Prophylaxis: Nystatin  PPI use: Pantoprazole 40mg daily. Denies GERD sx.   Current supplements for electrolyte replacement: Calcium/D BID,   Tx Coordinator: Angelika Muller, Using Med Card: Yes  Immunizations: annual flu shot [id number] season;  [id number]:  unknown; Prevnar 13: 2019; TDaP:  2019; Shingrix: unknown    Pain: Pt has APAP, but has not been taking. Denies pain.     Hyperglycemia due to steroids:  Pt currently taking Novolog 1 unit per 5 grams of carbs and sliding scale, Lantus 40 units once daily. Pt is not experiencing side effects.  SMBG: 3 time(s) daily. Ranges (patient reported):   Date FBG/ 2hours post Lunch/2hours post Dinner /2hours post    6/2 219      6/1 199 264 274    5/31 158 178 172    Symptoms of low blood sugar? none  Symptoms of high blood sugar? More urination, may be  From prostate.     Today's Vitals: There were no vitals taken for this visit.      ASSESSMENT:                            Medication Adherence: good, no issues identified    Heat Transplant:  Stable.    Pain: Stable.     Hyperglycemia due to steroids:  BG have been increasing the last few days. Unclear who is managing will discuss with txp team .     PLAN:                          Post Discharge Medication Reconciliation Status: discharge medications reconciled, continue medications without change.    Txp team...  1. Who is managing pt's blood sugars? May need some small adjustments, blood sugars increasing over the past couple days.     I spent 40 minutes with this patient today. I offer these suggestions for consideration by txp team. A copy of the visit note was provided to the patient's referring provider.    Will follow up in 2 months.    The patient was given a summary of these recommendations.     Xavi Mckeon, PharmD  MTM Pharmacist    Phone: [phone]

## 2020-06-02 NOTE — TELEPHONE ENCOUNTER
Pt with negative heart biopsy- grade 0.  Pt called and instructed to decrease Prednisone dose to 20mg in the AM and 15mg in the PM until next biopsy.  Pt states understanding instructions and plan of care.  Pt encouraged to call with any questions or concerns.

## 2020-06-03 NOTE — TELEPHONE ENCOUNTER
CLINIC COORDINATOR SCHEDULING NOTES    CALL RESULT: LVM    APPT TYPE: VIDEO VISIT RETURN / TELEPHONE VISIT RETURN    PROVIDER: any PA    DATE APPT NEEDED: within 14 days    ADDITIONAL NOTES: Hospital f/u, seen by IP team

## 2020-06-04 ENCOUNTER — TELEPHONE (OUTPATIENT)
Dept: TRANSPLANT | Facility: CLINIC | Age: 60
End: 2020-06-04

## 2020-06-04 ENCOUNTER — PRE VISIT (OUTPATIENT)
Dept: TRANSPLANT | Facility: CLINIC | Age: 60
End: 2020-06-04

## 2020-06-04 DIAGNOSIS — Z95.811 LVAD (LEFT VENTRICULAR ASSIST DEVICE) PRESENT (H): ICD-10-CM

## 2020-06-04 DIAGNOSIS — N13.30 HYDRONEPHROSIS, UNSPECIFIED HYDRONEPHROSIS TYPE: ICD-10-CM

## 2020-06-04 DIAGNOSIS — I50.22 CHRONIC SYSTOLIC CONGESTIVE HEART FAILURE (H): ICD-10-CM

## 2020-06-04 DIAGNOSIS — Z94.1 STATUS POST HEART TRANSPLANTATION (H): ICD-10-CM

## 2020-06-04 DIAGNOSIS — I50.9 HEART FAILURE (H): ICD-10-CM

## 2020-06-04 DIAGNOSIS — Z94.1 STATUS POST HEART TRANSPLANTATION (H): Primary | ICD-10-CM

## 2020-06-04 DIAGNOSIS — Z11.59 ENCOUNTER FOR SCREENING FOR OTHER VIRAL DISEASES: Primary | ICD-10-CM

## 2020-06-04 LAB
ANION GAP SERPL CALCULATED.3IONS-SCNC: 10 MMOL/L (ref 3–14)
BUN SERPL-MCNC: 71 MG/DL (ref 7–30)
CALCIUM SERPL-MCNC: 8.8 MG/DL (ref 8.5–10.1)
CHLORIDE SERPL-SCNC: 106 MMOL/L (ref 94–109)
CO2 SERPL-SCNC: 19 MMOL/L (ref 20–32)
CREAT SERPL-MCNC: 1.69 MG/DL (ref 0.66–1.25)
D DIMER PPP FEU-MCNC: 2 UG/ML FEU (ref 0–0.5)
ERYTHROCYTE [DISTWIDTH] IN BLOOD BY AUTOMATED COUNT: 18.1 % (ref 10–15)
GFR SERPL CREATININE-BSD FRML MDRD: 44 ML/MIN/{1.73_M2}
GLUCOSE SERPL-MCNC: 200 MG/DL (ref 70–99)
HCT VFR BLD AUTO: 28.6 % (ref 40–53)
HGB BLD-MCNC: 8.8 G/DL (ref 13.3–17.7)
INR PPP: 1.16 (ref 0.86–1.14)
LDH SERPL L TO P-CCNC: 144 U/L (ref 85–227)
MCH RBC QN AUTO: 26.7 PG (ref 26.5–33)
MCHC RBC AUTO-ENTMCNC: 30.8 G/DL (ref 31.5–36.5)
MCV RBC AUTO: 87 FL (ref 78–100)
PLATELET # BLD AUTO: 221 10E9/L (ref 150–450)
POTASSIUM SERPL-SCNC: 4.4 MMOL/L (ref 3.4–5.3)
RBC # BLD AUTO: 3.29 10E12/L (ref 4.4–5.9)
SODIUM SERPL-SCNC: 135 MMOL/L (ref 133–144)
TACROLIMUS BLD-MCNC: 3.9 UG/L (ref 5–15)
TME LAST DOSE: ABNORMAL H
WBC # BLD AUTO: 8.6 10E9/L (ref 4–11)

## 2020-06-04 RX ORDER — LIDOCAINE 40 MG/G
CREAM TOPICAL
Status: CANCELLED | OUTPATIENT
Start: 2020-06-04

## 2020-06-04 NOTE — TELEPHONE ENCOUNTER
Pt's brother, Romeo, is staying with pt at [facility] for one month while pt recovers from transplant.  He called today wondering if it is OK to leave pt to go shopping, for walks, etc.  Romeo instructed that is fine for him leave pt alone to run essential errands as needed, but to always wear a mask, wash hands, etc while out.

## 2020-06-04 NOTE — PROGRESS NOTES
"Mariusz Sharp is a 59 year old male who is being evaluated via a billable telephone visit.      The patient has been notified of following:     \"This telephone visit will be conducted via a call between you and your physician/provider. We have found that certain health care needs can be provided without the need for a physical exam.  This service lets us provide the care you need with a short phone conversation.  If a prescription is necessary we can send it directly to your pharmacy.  If lab work is needed we can place an order for that and you can then stop by our lab to have the test done at a later time.    Telephone visits are billed at different rates depending on your insurance coverage. During this emergency period, for some insurers they may be billed the same as an in-person visit.  Please reach out to your insurance provider with any questions.    If during the course of the call the physician/provider feels a telephone visit is not appropriate, you will not be charged for this service.\"    Patient has given verbal consent for Telephone visit?  Yes    What phone number would you like to be contacted at? [phone]    How would you like to obtain your AVS? Lucius Beaulieu MA    Due to the COVID 19 pandemic this visit was converted to a telephone visit in order to help prevent spread of infection in this patient and the general population.    Time of start: 10:30 am   Time of end: 10:52 am  Total duration of telephone visit: 22 minutes.    This visit would have been billed as a [id number] visit today.  Pt was recently discharged from the hospital.    HPI  Mariusz Sharp ( Pepe ) is a 59 year old male with type 2 diabetes mellitus.  Pt recent discharged from the hospital. Visit today for diabetes care.  Pt was admitted 5/17/2020-5/29/2020 and underwent a heart transplant on 5/18/2020.  Pt is now at home and states he is doing well.  Pt's hx is significant for type 2 diabetes mellitus dx 3 years " ago, HTN, hyperlipidemia, hx of ischemic heart disease s/p STEMI with ALYSSA 2019, LVAD placement, Stage 3 CKD, and obesity.  Apparently heart donor blood cx and respiratory cultures were + for H flu, staph and strep.  Red was also admitted 5/12-5/15/2020 for ANDREW due to nephrolithiasis complicated by hydronephrosis/ANDREW.  Post heart transplant he received high dose steroids and he is currently on a prednisone taper 20 mg each am and 15 mg each pm.  He has been having heart bx every Monday.  For his diabetes, pt is currently taking Lantus 40 units subcutaneous each pm and Novolog 1 unit/5 gms CHO with meals with correction scale  ( 1 unit/25 for BG > 140 ) and bedtime correction scale ( 1 unit/25 for BG > 200).  Pt's A1C was 8.0 % on 5/12/2020 and his A1C was 9.5 % on 7/5/2019. Pt is anemic.  Pt send us blood sugar readings for review.  His blood sugar readings remain above target with a  this am.  Yesterday, his FBS was 186, prelunch bs 257, predinner bs 227 and blood sugar at bedtime was 201.  He denies missing any insulin doses.  On ROS today, doing well.  He states chest incision has healed.  No fevers or chills  Pt denies headaches, blurred vision, n/v, SOB at rest, abd pain, diarrhea,blood in the stool, melena, dysuria, hematuria or numbness in his feet or hands.  Red denies foot ulcers at this time.    Diabetes Care  Retinopathy:none; pt seen by Oph in Dec 2019.  Nephropathy:yes- hx of CKD/ANDREW.  Neuropathy: none.  Foot Exam:no exam today.  Taking aspirin: yes.  Lipids: LDL 24 in 8/2018. Pt is taking Crestor.  Insulin: Basal and meal time insulin with correction scale ( 1 unit/25 for BG > 140 with meals and > 200 at bedtime).  Testing: glucose meter.    ROS  See under HPI.    Allergies  No Known Allergies    Medications  Current Outpatient Medications   Medication Sig Dispense Refill     ACCU-CHEK CHARLOTTE PLUS test strip        acetaminophen (TYLENOL) 325 MG tablet Take 2 tablets (650 mg) by mouth every 4  hours as needed for other (multimodal surgical pain management along with NSAIDS and opioid medication as indicated based on pain control and physical function.)       aspirin (ASA) 81 MG chewable tablet Take 1 tablet (81 mg) by mouth daily 90 tablet 3     BD SILVANA U/F 32G X 4 MM insulin pen needle Use 3-4 daily. 250 each 4     calcium carbonate 600 mg-vitamin D 400 units (CALTRATE) 600-400 MG-UNIT per tablet Take 1 tablet by mouth 2 times daily (with meals)       insulin aspart (NOVOLOG PEN) 100 UNIT/ML pen 1 unit / 4 gms CHO with meals,plus correction insulin. Pt uses approx 40 units in 24 hrs. 3 mL 3     insulin aspart (NOVOLOG PEN) 100 UNIT/ML pen Correction Scale - HIGH INSULIN RESISTANCE DOSING     Do Not give Correction Insulin if Pre-Meal BG less than 140.   For Pre-Meal  - 164 give 1 unit.   For Pre-Meal  - 189 give 2 units.   For Pre-Meal  - 214 give 3 units.   For Pre-Meal  - 239 give 4 units.   For Pre-Meal  - 264 give 5 units.   For Pre-Meal  - 289 give 6 units.   For Pre-Meal  - 314 give 7 units.   For Pre-Meal  - 339 give 8 units.   For Pre-Meal  - 364 give 9 units.   For Pre-Meal BG greater than or equal to 365 give 10 units  To be given with prandial insulin, and based on pre-meal blood glucose.   Notify provider if glucose greater than or equal to 350 mg/dL after administration of correction dose. If given at mealtime, administer within 30 minutes of start of meal 3 mL 0     insulin glargine (LANTUS PEN) 100 UNIT/ML pen Inject 44 units subcutaneous daily. 3 mL 0     mycophenolate (GENERIC EQUIVALENT) 500 MG tablet Take 3 tablets (1,500 mg) by mouth 2 times daily 96 tablet 1     nystatin (MYCOSTATIN) 046701 UNIT/ML suspension Take 10 mLs (1,000,000 Units) by mouth 4 times daily 1240 mL 1     pantoprazole (PROTONIX) 40 MG EC tablet Take 1 tablet (40 mg) by mouth every morning (before breakfast) 30 tablet 1     predniSONE (DELTASONE) 10 MG tablet  Take 2 tablets (20 mg) by mouth every evening AND 1.5 tablets (15 mg) every evening. 120 tablet 0     rosuvastatin (CRESTOR) 10 MG tablet Take 1 tablet (10 mg) by mouth daily 30 tablet 0     tacrolimus (GENERIC EQUIVALENT) 1 MG capsule Take 3 capsules (3 mg) by mouth every morning AND 2 capsules (2 mg) every evening. 150 capsule 1     valGANciclovir (VALCYTE) 450 MG tablet Take 1 tablet (450 mg) by mouth daily 30 tablet 0     insulin aspart (NOVOLOG PEN) 100 UNIT/ML pen Inject 1-7 Units Subcutaneous At Bedtime HIGH INSULIN RESISTANCE DOSING    Do Not give Bedtime Correction Insulin if BG less than 200.   For  - 224 give 1 units.   For  - 249 give 2 units.   For  - 274 give 3 units.   For  - 299 give 4 units.   For  - 324 give 5 units.   For  - 349 give 6 units.   For BG greater than or equal to 350 give 7 units.   Notify provider if glucose greater than or equal to 350 mg/dL after administration of correction dose. If given at mealtime, administer within 30 minutes of start of meal (Patient not taking: Reported on 6/4/2020) 3 mL 0     melatonin 1 MG TABS tablet Take 1 tablet (1 mg) by mouth nightly as needed for sleep (Patient not taking: Reported on 6/2/2020)       tacrolimus (GENERIC EQUIVALENT) 0.5 MG capsule Total of 2 mg BID or as directed by transplant team (Patient not taking: Reported on 6/2/2020) 120 capsule 0       Family History  Mother and father with hx of type 2 DM.  Social History   reports that he has never smoked. He has never used smokeless tobacco. He reports that he does not drink alcohol or use drugs.     Past Medical History  Past Medical History:   Diagnosis Date     Acute kidney injury (H)      CKD (chronic kidney disease)      Coronary artery disease      Diabetes mellitus (H)      HTN (hypertension)      Hyperlipidemia      Ischemic cardiomyopathy      LV (left ventricular) mural thrombus      Paroxysmal atrial flutter (H)      RVF (right ventricular  failure) (H)      Systolic heart failure (H)      Ventricular tachycardia (H)        Past Surgical History:   Procedure Laterality Date     COLONOSCOPY N/A 12/7/2018    Procedure: COLONOSCOPY;  Surgeon: Krish Mercado MD;  Location: [facility] OR     CV HEART BIOPSY N/A 5/24/2020    Procedure: Heart Biopsy;  Surgeon: Kit Bacon MD;  [facility]     CV HEART BIOPSY N/A 6/1/2020    Procedure: CV HEART BIOPSY;  Surgeon: Kit Bacon MD;  Location:  HEART CARDIAC CATH LAB     CV RIGHT HEART CATH N/A 2/19/2019    Procedure: RHC;  Surgeon: Brian Long MD;  Location:  HEART CARDIAC CATH LAB     CV RIGHT HEART CATH N/A 7/5/2019    Procedure: CV RIGHT HEART CATH;  Surgeon: Khris Mcclelland MD;  Location:  HEART CARDIAC CATH LAB     CV RIGHT HEART CATH N/A 5/24/2020    Procedure: Right Heart Cath;  Surgeon: Kit Bacon MD;  Location:  HEART CARDIAC CATH LAB     CV RIGHT HEART CATH N/A 6/1/2020    Procedure: CV RIGHT HEART CATH;  Surgeon: Kit Bacon MD;  Location:  HEART CARDIAC CATH LAB     HC PRQ TRLUML CORONARY ANGIOPLASTY ADDL BRANCH      PCI and ALYSSA to ostial LAD on 6/27/18     IMPLANT AUTOMATIC IMPLANTABLE CARDIOVERTER DEFIBRILLATOR       INSERT VENTRICULAR ASSIST DEVICE LEFT (HEARTMATE II) N/A 12/31/2018    Procedure: Median Sternotomy, On Cardiopulmonary Bypass Pump, Insertion of Left Ventricular Assist Device (Heartmate III);  Surgeon: Kamaljit Baker MD;  Location: [facility] OR     PICC DOUBLE LUMEN PLACEMENT Right 05/22/2020    5FR DL PICC inserted at right basilic vein, length 38cm.     TRANSPLANT HEART RECIPIENT AFTER HEART MATE N/A 5/18/2020    Procedure: REDO MEDIAN STERNOTOMY, LYSIS OF ADHESIONS, ON CARDIOPULMONARY BYPASS PUMP, HEART TRANSPLANT RECIPIENT, REMOVAL OF LEFT VENTRICULAR ASSIST DEVICE, REMOVAL OF AUTOMATED IMPLANTABLE CARDIOVERTER DEFIBRILLATOR;  Surgeon: Elder Willis MD;  Location:  [facility] OR       Physical Exam    No exam today.    RESULTS  Creatinine   Date Value Ref Range Status   06/04/2020 1.69 (H) 0.66 - 1.25 mg/dL Final     GFR Estimate   Date Value Ref Range Status   06/04/2020 44 (L) >60 mL/min/[1.73_m2] Final     Comment:     Non  GFR Calc  Starting 12/18/2018, serum creatinine based estimated GFR (eGFR) will be   calculated using the Chronic Kidney Disease Epidemiology Collaboration   (CKD-EPI) equation.       Hemoglobin A1C   Date Value Ref Range Status   05/12/2020 8.0 (H) 0 - 5.6 % Final     Comment:     Normal <5.7% Prediabetes 5.7-6.4%  Diabetes 6.5% or higher - adopted from ADA   consensus guidelines.       Potassium   Date Value Ref Range Status   06/04/2020 4.4 3.4 - 5.3 mmol/L Final     ALT   Date Value Ref Range Status   06/01/2020 40 0 - 70 U/L Final     AST   Date Value Ref Range Status   06/01/2020 14 0 - 45 U/L Final     TSH   Date Value Ref Range Status   06/26/2019 3.94 0.40 - 4.00 mU/L Final     T4 Free   Date Value Ref Range Status   07/23/2018 1.05 0.76 - 1.46 ng/dL Final       Cholesterol   Date Value Ref Range Status   08/06/2018 126 <200 mg/dL Final     HDL Cholesterol   Date Value Ref Range Status   08/06/2018 52 >39 mg/dL Final     LDL Cholesterol Calculated   Date Value Ref Range Status   08/06/2018 24 <100 mg/dL Final     Comment:     Desirable:       <100 mg/dl     Triglycerides   Date Value Ref Range Status   08/06/2018 246 (H) <150 mg/dL Final     Comment:     Borderline high:  150-199 mg/dl  High:             200-499 mg/dl  Very high:       >499 mg/dl       A1C   8.0 % 5/12/2020    ASSESSMENT/PLAN:      1.  TYPE 2 DIABETES MELLITUS: Type 2 diabetes mellitus complicated by hx of CAD and ischemic cardiomyopathy s/p heart transplant on 5/18/2020. Pt is doing well at this time and remains on a prednisone taper 20 mg each am and 15 mg each pm.  Red's blood sugar values are above target.  Pt instructed to increase Lantus 44 units daily and change  Novolog I/C ratio to 1:4 with current correction scale ( 1 unit/25 for BG > 140 ).  I plan to call Red next week 6/10/2020 to review his blood sugar readings.  Pt was seen by Oph in Dec 2019 without retinopathy.  He denies any sx of neuropathy or foot ulcers at this time.  He is taking ASA daily.    2.  CKD/ANDREW: Most recent creat was 1.69 with GFR 44 mL/min on 6/4/2020.    3.  HX OF ISCHEMIC CARDIOMYOPATHY: S/P heart transplant on 5/18/2020 and followed closely by transplant staff here.    4.  FOLLOW UP : with me next week on 6/10/2020.

## 2020-06-04 NOTE — PROGRESS NOTES
05/30/2020  154  280    05/31/2020  172  178  158  153    06/01/2020  274  264  257  199    06/02/2020  114  197  219    06/03/2020   154  97  221    06/04/2020  186

## 2020-06-05 ENCOUNTER — TELEPHONE (OUTPATIENT)
Dept: TRANSPLANT | Facility: CLINIC | Age: 60
End: 2020-06-05

## 2020-06-05 ENCOUNTER — VIRTUAL VISIT (OUTPATIENT)
Dept: ENDOCRINOLOGY | Facility: CLINIC | Age: 60
End: 2020-06-05
Payer: COMMERCIAL

## 2020-06-05 ENCOUNTER — TELEPHONE (OUTPATIENT)
Dept: CARDIOLOGY | Facility: CLINIC | Age: 60
End: 2020-06-05

## 2020-06-05 ENCOUNTER — VIRTUAL VISIT (OUTPATIENT)
Dept: UROLOGY | Facility: CLINIC | Age: 60
End: 2020-06-05
Payer: COMMERCIAL

## 2020-06-05 DIAGNOSIS — Z79.4 TYPE 2 DIABETES MELLITUS WITH HYPERGLYCEMIA, WITH LONG-TERM CURRENT USE OF INSULIN (H): Primary | ICD-10-CM

## 2020-06-05 DIAGNOSIS — N20.0 CALCULUS OF KIDNEY: Primary | ICD-10-CM

## 2020-06-05 DIAGNOSIS — E11.65 TYPE 2 DIABETES MELLITUS WITH HYPERGLYCEMIA, WITH LONG-TERM CURRENT USE OF INSULIN (H): Primary | ICD-10-CM

## 2020-06-05 DIAGNOSIS — Z94.1 STATUS POST HEART TRANSPLANTATION (H): ICD-10-CM

## 2020-06-05 RX ORDER — PEN NEEDLE, DIABETIC 32GX 5/32"
NEEDLE, DISPOSABLE MISCELLANEOUS
Qty: 250 EACH | Refills: 4 | Status: SHIPPED | OUTPATIENT
Start: 2020-06-05 | End: 2020-10-09

## 2020-06-05 NOTE — TELEPHONE ENCOUNTER
Tacrolimus level 3.9.  Goal 10-12.  Pt increase dose to 3mg in AM and 3mg in PM.  Pt to have level checked at appointment on 6/8/20.  Pt states understanding instructions and plan of care.

## 2020-06-05 NOTE — NURSING NOTE
Chief Complaint   Patient presents with     RECHECK     Review imaging       There were no vitals taken for this visit. There is no height or weight on file to calculate BMI.    Patient Active Problem List   Diagnosis     Heart failure (H)     ICD (implantable cardioverter-defibrillator), single chamber- NOT dependent     Type 2 diabetes mellitus with hyperglycemia, with long-term current use of insulin (H)     Weakness     LVAD (left ventricular assist device) present (H)     Chronic systolic congestive heart failure (H)     ASCVD (arteriosclerotic cardiovascular disease)     ANDREW (acute kidney injury) (H)     Cardiogenic shock (H)     CKD (chronic kidney disease) stage 3, GFR 30-59 ml/min (H)     Dyslipidemia     Hypotension, unspecified hypotension type     Hematuria     Left ventricular apical thrombus following MI (H)     Long term current use of anticoagulant     Monomorphic ventricular tachycardia (H)     ST elevation myocardial infarction involving left anterior descending (LAD) coronary artery (H)     Type 2 diabetes mellitus with hyperglycemia (H)     Unilateral inguinal hernia     Ureteral obstruction     Hydronephrosis     Biventricular heart failure (H)     Status post heart transplantation (H)     Heart replaced by transplant (H)       No Known Allergies    Current Outpatient Medications   Medication Sig Dispense Refill     ACCU-CHEK CHARLOTTE PLUS test strip        acetaminophen (TYLENOL) 325 MG tablet Take 2 tablets (650 mg) by mouth every 4 hours as needed for other (multimodal surgical pain management along with NSAIDS and opioid medication as indicated based on pain control and physical function.)       aspirin (ASA) 81 MG chewable tablet Take 1 tablet (81 mg) by mouth daily 90 tablet 3     BD SILVANA U/F 32G X 4 MM insulin pen needle Use 3-4 daily. 250 each 4     calcium carbonate 600 mg-vitamin D 400 units (CALTRATE) 600-400 MG-UNIT per tablet Take 1 tablet by mouth 2 times daily (with meals)        insulin aspart (NOVOLOG PEN) 100 UNIT/ML pen 1 unit / 4 gms CHO with meals,plus correction insulin. Pt uses approx 40 units in 24 hrs. 3 mL 3     insulin aspart (NOVOLOG PEN) 100 UNIT/ML pen Correction Scale - HIGH INSULIN RESISTANCE DOSING     Do Not give Correction Insulin if Pre-Meal BG less than 140.   For Pre-Meal  - 164 give 1 unit.   For Pre-Meal  - 189 give 2 units.   For Pre-Meal  - 214 give 3 units.   For Pre-Meal  - 239 give 4 units.   For Pre-Meal  - 264 give 5 units.   For Pre-Meal  - 289 give 6 units.   For Pre-Meal  - 314 give 7 units.   For Pre-Meal  - 339 give 8 units.   For Pre-Meal  - 364 give 9 units.   For Pre-Meal BG greater than or equal to 365 give 10 units  To be given with prandial insulin, and based on pre-meal blood glucose.   Notify provider if glucose greater than or equal to 350 mg/dL after administration of correction dose. If given at mealtime, administer within 30 minutes of start of meal 3 mL 0     insulin aspart (NOVOLOG PEN) 100 UNIT/ML pen Inject 1-7 Units Subcutaneous At Bedtime HIGH INSULIN RESISTANCE DOSING    Do Not give Bedtime Correction Insulin if BG less than 200.   For  - 224 give 1 units.   For  - 249 give 2 units.   For  - 274 give 3 units.   For  - 299 give 4 units.   For  - 324 give 5 units.   For  - 349 give 6 units.   For BG greater than or equal to 350 give 7 units.   Notify provider if glucose greater than or equal to 350 mg/dL after administration of correction dose. If given at mealtime, administer within 30 minutes of start of meal 3 mL 0     insulin glargine (LANTUS PEN) 100 UNIT/ML pen Inject 44 units subcutaneous daily. 3 mL 0     mycophenolate (GENERIC EQUIVALENT) 500 MG tablet Take 3 tablets (1,500 mg) by mouth 2 times daily 96 tablet 1     nystatin (MYCOSTATIN) 925705 UNIT/ML suspension Take 10 mLs (1,000,000 Units) by mouth 4 times daily 1240 mL 1     pantoprazole  (PROTONIX) 40 MG EC tablet Take 1 tablet (40 mg) by mouth every morning (before breakfast) 30 tablet 1     predniSONE (DELTASONE) 10 MG tablet Take 2 tablets (20 mg) by mouth every evening AND 1.5 tablets (15 mg) every evening. 120 tablet 0     rosuvastatin (CRESTOR) 10 MG tablet Take 1 tablet (10 mg) by mouth daily 30 tablet 0     tacrolimus (GENERIC EQUIVALENT) 1 MG capsule Take 3 capsules (3 mg) by mouth every morning AND 2 capsules (2 mg) every evening. (Patient taking differently: Take 3 capsules (3 mg) by mouth every morning AND 2 capsules (3 mg) every evening.) 150 capsule 1     valGANciclovir (VALCYTE) 450 MG tablet Take 1 tablet (450 mg) by mouth daily 30 tablet 0     melatonin 1 MG TABS tablet Take 1 tablet (1 mg) by mouth nightly as needed for sleep (Patient not taking: Reported on 6/2/2020)       tacrolimus (GENERIC EQUIVALENT) 0.5 MG capsule Total of 2 mg BID or as directed by transplant team (Patient not taking: Reported on 6/2/2020) 120 capsule 0       Social History     Tobacco Use     Smoking status: Never Smoker     Smokeless tobacco: Never Used   Substance Use Topics     Alcohol use: No     Frequency: Never     Drug use: No       Airam Allison CMA  6/5/2020  11:35 AM

## 2020-06-05 NOTE — PATIENT INSTRUCTIONS
Please follow up in August with      It was a pleasure meeting with you today.  Thank you for allowing me and my team the privilege of caring for you today.  YOU are the reason we are here, and I truly hope we provided you with the excellent service you deserve.  Please let us know if there is anything else we can do for you so that we can be sure you are leaving completely satisfied with your care experience.

## 2020-06-05 NOTE — LETTER
"6/5/2020       RE: Mariusz Sharp  [address] [facility] [address]     Dear Colleague,    Thank you for referring your patient, Mariusz Sharp, to the [facility]OLOG[facility] AND [facility]ST FOR PROSTATE AND UROLOGIC CANCERS at [facility]. Please see a copy of my visit note below.    Mariusz Sharp is a 59 year old male who is being evaluated via a billable telephone visit.      The patient has been notified of following:     \"This telephone visit will be conducted via a call between you and your physician/provider. We have found that certain health care needs can be provided without the need for a physical exam.  This service lets us provide the care you need with a short phone conversation.  If a prescription is necessary we can send it directly to your pharmacy.  If lab work is needed we can place an order for that and you can then stop by our lab to have the test done at a later time.    Telephone visits are billed at different rates depending on your insurance coverage. During this emergency period, for some insurers they may be billed the same as an in-person visit.  Please reach out to your insurance provider with any questions.    If during the course of the call the physician/provider feels a telephone visit is not appropriate, you will not be charged for this service.\"    Patient has given verbal consent for Telephone visit?  Yes    What phone number would you like to be contacted at? [phone]    How would you like to obtain your AVS? Linettehart     Patient very recently s/p heart transplant. Off anticoagulation now.   Ureteral stone has passed. Continues to have large renal stone.   Discussed PCNL for large stone.   Will allow patient more time to recover from heart transplant   Will follow up in august to assess overall health status and schedule PCNL.         Phone call duration: 7 minutes    Megan Ibarra MD            "

## 2020-06-05 NOTE — TELEPHONE ENCOUNTER
Call complete for pre procedure reminder, travel screen and no visitor policy.    No COVID test scheduled. Transplant 5/2020

## 2020-06-05 NOTE — LETTER
"6/5/2020       RE: Mariusz Sharp  [address] [facility] [address]     Dear Colleague,    Thank you for referring your patient, Mariusz Sharp, to the [facility] ENDOCRINOLOGY at [facility]. Please see a copy of my visit note below.    Mariusz Sharp is a 59 year old male who is being evaluated via a billable telephone visit.      The patient has been notified of following:     \"This telephone visit will be conducted via a call between you and your physician/provider. We have found that certain health care needs can be provided without the need for a physical exam.  This service lets us provide the care you need with a short phone conversation.  If a prescription is necessary we can send it directly to your pharmacy.  If lab work is needed we can place an order for that and you can then stop by our lab to have the test done at a later time.    Telephone visits are billed at different rates depending on your insurance coverage. During this emergency period, for some insurers they may be billed the same as an in-person visit.  Please reach out to your insurance provider with any questions.    If during the course of the call the physician/provider feels a telephone visit is not appropriate, you will not be charged for this service.\"    Patient has given verbal consent for Telephone visit?  Yes    What phone number would you like to be contacted at? [phone]    How would you like to obtain your AVS? Lucius Beaulieu MA    Due to the COVID 19 pandemic this visit was converted to a telephone visit in order to help prevent spread of infection in this patient and the general population.    Time of start: 10:30 am   Time of end: 10:52 am  Total duration of telephone visit: 22 minutes.    This visit would have been billed as a 47271 visit today.  Pt was recently discharged from the hospital.    HPI  Mariusz ( Red ) ISABEL Sharp is a 59 year old male with type 2 diabetes " mellitus.  Pt recent discharged from the hospital. Visit today for diabetes care.  Pt was admitted 5/17/2020-5/29/2020 and underwent a heart transplant on 5/18/2020.  Pt is now at home and states he is doing well.  Pt's hx is significant for type 2 diabetes mellitus dx 3 years ago, HTN, hyperlipidemia, hx of ischemic heart disease s/p STEMI with ALYSSA 2019, LVAD placement, Stage 3 CKD, and obesity.  Apparently heart donor blood cx and respiratory cultures were + for H flu, staph and strep.  Red was also admitted 5/12-5/15/2020 for ANDREW due to nephrolithiasis complicated by hydronephrosis/ANDREW.  Post heart transplant he received high dose steroids and he is currently on a prednisone taper 20 mg each am and 15 mg each pm.  He has been having heart bx every Monday.  For his diabetes, pt is currently taking Lantus 40 units subcutaneous each pm and Novolog 1 unit/5 gms CHO with meals with correction scale  ( 1 unit/25 for BG > 140 ) and bedtime correction scale ( 1 unit/25 for BG > 200).  Pt's A1C was 8.0 % on 5/12/2020 and his A1C was 9.5 % on 7/5/2019. Pt is anemic.  Pt send us blood sugar readings for review.  His blood sugar readings remain above target with a  this am.  Yesterday, his FBS was 186, prelunch bs 257, predinner bs 227 and blood sugar at bedtime was 201.  He denies missing any insulin doses.  On ROS today, doing well.  He states chest incision has healed.  No fevers or chills  Pt denies headaches, blurred vision, n/v, SOB at rest, abd pain, diarrhea,blood in the stool, melena, dysuria, hematuria or numbness in his feet or hands.  Red denies foot ulcers at this time.    Diabetes Care  Retinopathy:none; pt seen by [facility] in Dec 2019.  Nephropathy:yes- hx of CKD/ANDREW.  Neuropathy: none.  Foot Exam:no exam today.  Taking aspirin: yes.  Lipids: LDL 24 in 8/2018. Pt is taking Crestor.  Insulin: Basal and meal time insulin with correction scale ( 1 unit/25 for BG > 140 with meals and > 200 at  bedtime).  Testing: glucose meter.    ROS  See under HPI.    Allergies  No Known Allergies    Medications  Current Outpatient Medications   Medication Sig Dispense Refill     ACCU-CHEK CHARLOTTE PLUS test strip        acetaminophen (TYLENOL) 325 MG tablet Take 2 tablets (650 mg) by mouth every 4 hours as needed for other (multimodal surgical pain management along with NSAIDS and opioid medication as indicated based on pain control and physical function.)       aspirin (ASA) 81 MG chewable tablet Take 1 tablet (81 mg) by mouth daily 90 tablet 3     BD SILVANA U/F 32G X 4 MM insulin pen needle Use 3-4 daily. 250 each 4     calcium carbonate 600 mg-vitamin D 400 units (CALTRATE) 600-400 MG-UNIT per tablet Take 1 tablet by mouth 2 times daily (with meals)       insulin aspart (NOVOLOG PEN) 100 UNIT/ML pen 1 unit / 4 gms CHO with meals,plus correction insulin. Pt uses approx 40 units in 24 hrs. 3 mL 3     insulin aspart (NOVOLOG PEN) 100 UNIT/ML pen Correction Scale - HIGH INSULIN RESISTANCE DOSING     Do Not give Correction Insulin if Pre-Meal BG less than 140.   For Pre-Meal  - 164 give 1 unit.   For Pre-Meal  - 189 give 2 units.   For Pre-Meal  - 214 give 3 units.   For Pre-Meal  - 239 give 4 units.   For Pre-Meal  - 264 give 5 units.   For Pre-Meal  - 289 give 6 units.   For Pre-Meal  - 314 give 7 units.   For Pre-Meal  - 339 give 8 units.   For Pre-Meal  - 364 give 9 units.   For Pre-Meal BG greater than or equal to 365 give 10 units  To be given with prandial insulin, and based on pre-meal blood glucose.   Notify provider if glucose greater than or equal to 350 mg/dL after administration of correction dose. If given at mealtime, administer within 30 minutes of start of meal 3 mL 0     insulin glargine (LANTUS PEN) 100 UNIT/ML pen Inject 44 units subcutaneous daily. 3 mL 0     mycophenolate (GENERIC EQUIVALENT) 500 MG tablet Take 3 tablets (1,500 mg) by mouth 2 times  daily 96 tablet 1     nystatin (MYCOSTATIN) 991409 UNIT/ML suspension Take 10 mLs (1,000,000 Units) by mouth 4 times daily 1240 mL 1     pantoprazole (PROTONIX) 40 MG EC tablet Take 1 tablet (40 mg) by mouth every morning (before breakfast) 30 tablet 1     predniSONE (DELTASONE) 10 MG tablet Take 2 tablets (20 mg) by mouth every evening AND 1.5 tablets (15 mg) every evening. 120 tablet 0     rosuvastatin (CRESTOR) 10 MG tablet Take 1 tablet (10 mg) by mouth daily 30 tablet 0     tacrolimus (GENERIC EQUIVALENT) 1 MG capsule Take 3 capsules (3 mg) by mouth every morning AND 2 capsules (2 mg) every evening. 150 capsule 1     valGANciclovir (VALCYTE) 450 MG tablet Take 1 tablet (450 mg) by mouth daily 30 tablet 0     insulin aspart (NOVOLOG PEN) 100 UNIT/ML pen Inject 1-7 Units Subcutaneous At Bedtime HIGH INSULIN RESISTANCE DOSING    Do Not give Bedtime Correction Insulin if BG less than 200.   For  - 224 give 1 units.   For  - 249 give 2 units.   For  - 274 give 3 units.   For  - 299 give 4 units.   For  - 324 give 5 units.   For  - 349 give 6 units.   For BG greater than or equal to 350 give 7 units.   Notify provider if glucose greater than or equal to 350 mg/dL after administration of correction dose. If given at mealtime, administer within 30 minutes of start of meal (Patient not taking: Reported on 6/4/2020) 3 mL 0     melatonin 1 MG TABS tablet Take 1 tablet (1 mg) by mouth nightly as needed for sleep (Patient not taking: Reported on 6/2/2020)       tacrolimus (GENERIC EQUIVALENT) 0.5 MG capsule Total of 2 mg BID or as directed by transplant team (Patient not taking: Reported on 6/2/2020) 120 capsule 0       Family History  Mother and father with hx of type 2 DM.  Social History   reports that he has never smoked. He has never used smokeless tobacco. He reports that he does not drink alcohol or use drugs.     Past Medical History  Past Medical History:   Diagnosis Date      Acute kidney injury (H)      CKD (chronic kidney disease)      Coronary artery disease      Diabetes mellitus (H)      HTN (hypertension)      Hyperlipidemia      Ischemic cardiomyopathy      LV (left ventricular) mural thrombus      Paroxysmal atrial flutter (H)      RVF (right ventricular failure) (H)      Systolic heart failure (H)      Ventricular tachycardia (H)        Past Surgical History:   Procedure Laterality Date     COLONOSCOPY N/A 12/7/2018    Procedure: COLONOSCOPY;  Surgeon: Krish Mercado MD;  Location: [facility] OR     CV HEART BIOPSY N/A 5/24/2020    Procedure: Heart Biopsy;  Surgeon: Kit Bacon MD;  Location: [facility] HEART CARDIAC CATH LAB     CV HEART BIOPSY N/A 6/1/2020    Procedure: CV HEART BIOPSY;  Surgeon: Kit Bacon MD;  Location:  HEART CARDIAC CATH LAB     CV RIGHT HEART CATH N/A 2/19/2019    Procedure: RHC;  Surgeon: Brian oLng MD;  Location:  HEART CARDIAC CATH LAB     CV RIGHT HEART CATH N/A 7/5/2019    Procedure: CV RIGHT HEART CATH;  Surgeon: Khris Mcclelland MD;  Location:  HEART CARDIAC CATH LAB     CV RIGHT HEART CATH N/A 5/24/2020    Procedure: Right Heart Cath;  Surgeon: Kit Bacon MD;  Location:  HEART CARDIAC CATH LAB     CV RIGHT HEART CATH N/A 6/1/2020    Procedure: CV RIGHT HEART CATH;  Surgeon: Kit Bacon MD;  Location:  HEART CARDIAC CATH LAB     HC PRQ TRLUML CORONARY ANGIOPLASTY ADDL BRANCH      PCI and ALYSSA to ostial LAD on 6/27/18     IMPLANT AUTOMATIC IMPLANTABLE CARDIOVERTER DEFIBRILLATOR       INSERT VENTRICULAR ASSIST DEVICE LEFT (HEARTMATE II) N/A 12/31/2018    Procedure: Median Sternotomy, On Cardiopulmonary Bypass Pump, Insertion of Left Ventricular Assist Device (Heartmate III);  Surgeon: Kamaljit Baker MD;  Location: [facility] OR     PICC DOUBLE LUMEN PLACEMENT Right 05/22/2020    5FR DL PICC inserted at right basilic vein, length 38cm.     TRANSPLANT HEART RECIPIENT AFTER  HEART MATE N/A 5/18/2020    Procedure: REDO MEDIAN STERNOTOMY, LYSIS OF ADHESIONS, ON CARDIOPULMONARY BYPASS PUMP, HEART TRANSPLANT RECIPIENT, REMOVAL OF LEFT VENTRICULAR ASSIST DEVICE, REMOVAL OF AUTOMATED IMPLANTABLE CARDIOVERTER DEFIBRILLATOR;  Surgeon: Elder Willis MD;  Location: [facility] OR       Physical Exam    No exam today.    RESULTS  Creatinine   Date Value Ref Range Status   06/04/2020 1.69 (H) 0.66 - 1.25 mg/dL Final     GFR Estimate   Date Value Ref Range Status   06/04/2020 44 (L) >60 mL/min/[1.73_m2] Final     Comment:     Non  GFR Calc  Starting 12/18/2018, serum creatinine based estimated GFR (eGFR) will be   calculated using the Chronic Kidney Disease Epidemiology Collaboration   (CKD-EPI) equation.       Hemoglobin A1C   Date Value Ref Range Status   05/12/2020 8.0 (H) 0 - 5.6 % Final     Comment:     Normal <5.7% Prediabetes 5.7-6.4%  Diabetes 6.5% or higher - adopted from ADA   consensus guidelines.       Potassium   Date Value Ref Range Status   06/04/2020 4.4 3.4 - 5.3 mmol/L Final     ALT   Date Value Ref Range Status   06/01/2020 40 0 - 70 U/L Final     AST   Date Value Ref Range Status   06/01/2020 14 0 - 45 U/L Final     TSH   Date Value Ref Range Status   06/26/2019 3.94 0.40 - 4.00 mU/L Final     T4 Free   Date Value Ref Range Status   07/23/2018 1.05 0.76 - 1.46 ng/dL Final       Cholesterol   Date Value Ref Range Status   08/06/2018 126 <200 mg/dL Final     HDL Cholesterol   Date Value Ref Range Status   08/06/2018 52 >39 mg/dL Final     LDL Cholesterol Calculated   Date Value Ref Range Status   08/06/2018 24 <100 mg/dL Final     Comment:     Desirable:       <100 mg/dl     Triglycerides   Date Value Ref Range Status   08/06/2018 246 (H) <150 mg/dL Final     Comment:     Borderline high:  150-199 mg/dl  High:             200-499 mg/dl  Very high:       >499 mg/dl       A1C   8.0 % 5/12/2020    ASSESSMENT/PLAN:      1.  TYPE 2 DIABETES MELLITUS: Type 2 diabetes  mellitus complicated by hx of CAD and ischemic cardiomyopathy s/p heart transplant on 5/18/2020. Pt is doing well at this time and remains on a prednisone taper 20 mg each am and 15 mg each pm.  Red's blood sugar values are above target.  Pt instructed to increase Lantus 44 units daily and change Novolog I/C ratio to 1:4 with current correction scale ( 1 unit/25 for BG > 140 ).  I plan to call Red next week 6/10/2020 to review his blood sugar readings.  Pt was seen by [facility] in Dec 2019 without retinopathy.  He denies any sx of neuropathy or foot ulcers at this time.  He is taking ASA daily.    2.  CKD/ANDREW: Most recent creat was 1.69 with GFR 44 mL/min on 6/4/2020.    3.  HX OF ISCHEMIC CARDIOMYOPATHY: S/P heart transplant on 5/18/2020 and followed closely by transplant staff here.    4.  FOLLOW UP : with me next week on 6/10/2020.      05/30/2020  154  280    05/31/2020  172  178  158  153    06/01/2020  274  264  257  199    06/02/2020  114  197  219    06/03/2020   154  97  221    06/04/2020  186    Again, thank you for allowing me to participate in the care of your patient.      Sincerely,    Jeannette Schafer PA-C

## 2020-06-05 NOTE — PATIENT INSTRUCTIONS
Increase Lantus 44 units daily.  Change Novolog 1 unit per 4 gms carbs with meals plus current correction scale.  Continue to check your blood sugar fasting each am, before lunch, before dinner and at bedtime.  I will call you on 6/10/2020 at 12:30 pm.  Keep up the good work!!  Sincerely,  Jeannette Schafer PA-C

## 2020-06-05 NOTE — PROGRESS NOTES
"Mariusz Sharp is a 59 year old male who is being evaluated via a billable telephone visit.      The patient has been notified of following:     \"This telephone visit will be conducted via a call between you and your physician/provider. We have found that certain health care needs can be provided without the need for a physical exam.  This service lets us provide the care you need with a short phone conversation.  If a prescription is necessary we can send it directly to your pharmacy.  If lab work is needed we can place an order for that and you can then stop by our lab to have the test done at a later time.    Telephone visits are billed at different rates depending on your insurance coverage. During this emergency period, for some insurers they may be billed the same as an in-person visit.  Please reach out to your insurance provider with any questions.    If during the course of the call the physician/provider feels a telephone visit is not appropriate, you will not be charged for this service.\"    Patient has given verbal consent for Telephone visit?  Yes    What phone number would you like to be contacted at? [phone]    How would you like to obtain your AVS? Lucius     Patient very recently s/p heart transplant. Off anticoagulation now.   Ureteral stone has passed. Continues to have large renal stone.   Discussed PCNL for large stone.   Will allow patient more time to recover from heart transplant   Will follow up in august to assess overall health status and schedule PCNL.         Phone call duration: 7 minutes    Megan Ibarra MD      "

## 2020-06-08 ENCOUNTER — HOSPITAL ENCOUNTER (OUTPATIENT)
Facility: CLINIC | Age: 60
Discharge: HOME | End: 2020-06-08
Attending: INTERNAL MEDICINE | Admitting: INTERNAL MEDICINE
Payer: COMMERCIAL

## 2020-06-08 ENCOUNTER — APPOINTMENT (OUTPATIENT)
Dept: MEDSURG UNIT | Facility: CLINIC | Age: 60
End: 2020-06-08
Attending: INTERNAL MEDICINE
Payer: COMMERCIAL

## 2020-06-08 ENCOUNTER — TELEPHONE (OUTPATIENT)
Dept: TRANSPLANT | Facility: CLINIC | Age: 60
End: 2020-06-08

## 2020-06-08 ENCOUNTER — APPOINTMENT (OUTPATIENT)
Dept: LAB | Facility: CLINIC | Age: 60
End: 2020-06-08
Attending: INTERNAL MEDICINE
Payer: COMMERCIAL

## 2020-06-08 VITALS
TEMPERATURE: 98.6 F | HEIGHT: 64 IN | WEIGHT: 181 LBS | DIASTOLIC BLOOD PRESSURE: 99 MMHG | RESPIRATION RATE: 16 BRPM | SYSTOLIC BLOOD PRESSURE: 145 MMHG | OXYGEN SATURATION: 99 % | BODY MASS INDEX: 30.9 KG/M2

## 2020-06-08 DIAGNOSIS — Z94.1 HEART REPLACED BY TRANSPLANT (H): ICD-10-CM

## 2020-06-08 DIAGNOSIS — I15.8 OTHER SECONDARY HYPERTENSION: Primary | ICD-10-CM

## 2020-06-08 DIAGNOSIS — Z94.1 STATUS POST HEART TRANSPLANTATION (H): Primary | ICD-10-CM

## 2020-06-08 DIAGNOSIS — Z94.1 STATUS POST HEART TRANSPLANTATION (H): ICD-10-CM

## 2020-06-08 DIAGNOSIS — Z94.1 HEART REPLACED BY TRANSPLANT (H): Primary | ICD-10-CM

## 2020-06-08 LAB
ALBUMIN SERPL-MCNC: 3.3 G/DL (ref 3.4–5)
ALP SERPL-CCNC: 46 U/L (ref 40–150)
ALT SERPL W P-5'-P-CCNC: 47 U/L (ref 0–70)
ANION GAP SERPL CALCULATED.3IONS-SCNC: 10 MMOL/L (ref 3–14)
AST SERPL W P-5'-P-CCNC: 18 U/L (ref 0–45)
BILIRUB SERPL-MCNC: 0.8 MG/DL (ref 0.2–1.3)
BUN SERPL-MCNC: 91 MG/DL (ref 7–30)
CALCIUM SERPL-MCNC: 9.3 MG/DL (ref 8.5–10.1)
CHLORIDE SERPL-SCNC: 109 MMOL/L (ref 94–109)
CO2 SERPL-SCNC: 18 MMOL/L (ref 20–32)
CREAT SERPL-MCNC: 1.74 MG/DL (ref 0.66–1.25)
ERYTHROCYTE [DISTWIDTH] IN BLOOD BY AUTOMATED COUNT: 18.8 % (ref 10–15)
GFR SERPL CREATININE-BSD FRML MDRD: 42 ML/MIN/{1.73_M2}
GLUCOSE SERPL-MCNC: 204 MG/DL (ref 70–99)
HCT VFR BLD AUTO: 29.6 % (ref 40–53)
HGB BLD-MCNC: 9.1 G/DL (ref 13.3–17.7)
MAGNESIUM SERPL-MCNC: 1.8 MG/DL (ref 1.6–2.3)
MCH RBC QN AUTO: 26.6 PG (ref 26.5–33)
MCHC RBC AUTO-ENTMCNC: 30.7 G/DL (ref 31.5–36.5)
MCV RBC AUTO: 87 FL (ref 78–100)
PHOSPHATE SERPL-MCNC: 4.2 MG/DL (ref 2.5–4.5)
PLATELET # BLD AUTO: 212 10E9/L (ref 150–450)
POTASSIUM SERPL-SCNC: 4.6 MMOL/L (ref 3.4–5.3)
PROT SERPL-MCNC: 6.5 G/DL (ref 6.8–8.8)
RBC # BLD AUTO: 3.42 10E12/L (ref 4.4–5.9)
SODIUM SERPL-SCNC: 136 MMOL/L (ref 133–144)
TACROLIMUS BLD-MCNC: 5.3 UG/L (ref 5–15)
TME LAST DOSE: NORMAL H
WBC # BLD AUTO: 9.2 10E9/L (ref 4–11)

## 2020-06-08 PROCEDURE — 27210794 ZZH OR GENERAL SUPPLY STERILE: Performed by: INTERNAL MEDICINE

## 2020-06-08 PROCEDURE — 88346 IMFLUOR 1ST 1ANTB STAIN PX: CPT | Performed by: INTERNAL MEDICINE

## 2020-06-08 PROCEDURE — 40000166 ZZH STATISTIC PP CARE STAGE 1

## 2020-06-08 PROCEDURE — 99214 OFFICE O/P EST MOD 30 MIN: CPT | Mod: 25 | Performed by: NURSE PRACTITIONER

## 2020-06-08 PROCEDURE — 85027 COMPLETE CBC AUTOMATED: CPT | Performed by: INTERNAL MEDICINE

## 2020-06-08 PROCEDURE — 80197 ASSAY OF TACROLIMUS: CPT | Performed by: INTERNAL MEDICINE

## 2020-06-08 PROCEDURE — 88350 IMFLUOR EA ADDL 1ANTB STN PX: CPT | Performed by: INTERNAL MEDICINE

## 2020-06-08 PROCEDURE — 93505 ENDOMYOCARDIAL BIOPSY: CPT | Performed by: INTERNAL MEDICINE

## 2020-06-08 PROCEDURE — 88307 TISSUE EXAM BY PATHOLOGIST: CPT | Performed by: INTERNAL MEDICINE

## 2020-06-08 PROCEDURE — 80053 COMPREHEN METABOLIC PANEL: CPT | Performed by: INTERNAL MEDICINE

## 2020-06-08 PROCEDURE — 25000125 ZZHC RX 250: Performed by: INTERNAL MEDICINE

## 2020-06-08 PROCEDURE — 36415 COLL VENOUS BLD VENIPUNCTURE: CPT | Performed by: INTERNAL MEDICINE

## 2020-06-08 PROCEDURE — C1894 INTRO/SHEATH, NON-LASER: HCPCS | Performed by: INTERNAL MEDICINE

## 2020-06-08 PROCEDURE — 83735 ASSAY OF MAGNESIUM: CPT | Performed by: INTERNAL MEDICINE

## 2020-06-08 PROCEDURE — 84100 ASSAY OF PHOSPHORUS: CPT | Performed by: INTERNAL MEDICINE

## 2020-06-08 RX ORDER — TACROLIMUS 0.5 MG/1
CAPSULE ORAL
Qty: 120 CAPSULE | Refills: 0 | Status: SHIPPED | OUTPATIENT
Start: 2020-06-08 | End: 2020-07-21

## 2020-06-08 RX ORDER — AMLODIPINE BESYLATE 5 MG/1
5 TABLET ORAL AT BEDTIME
Qty: 90 TABLET | Refills: 3 | Status: ON HOLD | OUTPATIENT
Start: 2020-06-08 | End: 2020-06-15

## 2020-06-08 RX ORDER — TACROLIMUS 0.5 MG/1
CAPSULE ORAL
Qty: 120 CAPSULE | Refills: 0 | Status: SHIPPED | OUTPATIENT
Start: 2020-06-08 | End: 2020-06-08

## 2020-06-08 RX ORDER — LIDOCAINE 40 MG/G
CREAM TOPICAL
Status: COMPLETED | OUTPATIENT
Start: 2020-06-08 | End: 2020-06-08

## 2020-06-08 RX ORDER — CLOTRIMAZOLE 10 MG/1
10 LOZENGE ORAL 4 TIMES DAILY
Qty: 360 TROCHE | Refills: 1 | Status: SHIPPED | OUTPATIENT
Start: 2020-06-08 | End: 2020-08-26

## 2020-06-08 RX ORDER — TACROLIMUS 1 MG/1
CAPSULE ORAL
Qty: 150 CAPSULE | Refills: 1 | Status: SHIPPED | OUTPATIENT
Start: 2020-06-08 | End: 2020-06-15

## 2020-06-08 RX ADMIN — LIDOCAINE: 40 CREAM TOPICAL at 08:56

## 2020-06-08 ASSESSMENT — MIFFLIN-ST. JEOR: SCORE: 1557.01

## 2020-06-08 NOTE — PROGRESS NOTES
ADULT HEART TRANSPLANT     HPI:   Mr. Sharp is a 59 year old male who presents to 2A today after biopsy for routine heart transplant follow-up. 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 First biopsy 5/24 was negative for significant rejection. RHC 5/24 RA 7, mPA 24, PCW 15, and CI 2.6.  TTE 5/23 showed stable graft function, LVEF 65-70% and normal RV size/function. Post-operative course was c/b NSVT (with no hemodynamic compromise), leukocytosis with C Acnes growing from his former LVAD site (thought to be a contaminant, but treated with IV vanco --> po doxy through 6/1/20, 14d course total), and in the setting of donor blood growing S anginosus and Veillonella (also thought to be a contaminant, but treated with Vanco/zosyn --> Vanco/Flagyl for a total of 7 days). He also had hyperkalemia, which improved following kayexalate and stopping bactrim. He discharged 5/29 to [facility].    Since hospital discharge patient reports feeling well.  He went on a walk the other day and actually walked 5 miles due to getting lost in [place].  Denies any exertional chest pain, shortness of breath, lightheadedness.  Blood pressures at home have been elevated above 140s systolic recently.  No low blood pressures.  His blood sugars are controlled.  He denies lower extremity edema, orthopnea, PND.  Denies nausea, vomiting, diarrhea, night sweats, mouth sores, oral lesions, joint pains, fever, chills. His tacrolimus level is not yet therapeutic, dose increased to 3 mg bid on 6/4. Did receive Simulect, last on 5/29.       PAST MEDICAL HISTORY:  Past Medical History:   Diagnosis Date     Acute kidney injury (H)      CKD (chronic kidney disease)      Coronary artery disease      Diabetes mellitus (H)      HTN (hypertension)      Hyperlipidemia       Ischemic cardiomyopathy      LV (left ventricular) mural thrombus      Paroxysmal atrial flutter (H)      RVF (right ventricular failure) (H)      Systolic heart failure (H)      Ventricular tachycardia (H)        FAMILY HISTORY:  No family history on file.    SOCIAL HISTORY:  Socioeconomic History     Marital status: Single   Tobacco Use     Smoking status: Never Smoker     Smokeless tobacco: Never Used   Substance and Sexual Activity     Alcohol use: No     Frequency: Never     Drug use: No     Sexual activity: None       CURRENT MEDICATIONS:  No current facility-administered medications on file prior to encounter.   ACCU-CHEK CHARLOTTE PLUS test strip,   acetaminophen (TYLENOL) 325 MG tablet, Take 2 tablets (650 mg) by mouth every 4 hours as needed for other (multimodal surgical pain management along with NSAIDS and opioid medication as indicated based on pain control and physical function.)  aspirin (ASA) 81 MG chewable tablet, Take 1 tablet (81 mg) by mouth daily  calcium carbonate 600 mg-vitamin D 400 units (CALTRATE) 600-400 MG-UNIT per tablet, Take 1 tablet by mouth 2 times daily (with meals)  insulin aspart (NOVOLOG PEN) 100 UNIT/ML pen, Correction Scale - HIGH INSULIN RESISTANCE DOSING     Do Not give Correction Insulin if Pre-Meal BG less than 140.   For Pre-Meal  - 164 give 1 unit.   For Pre-Meal  - 189 give 2 units.   For Pre-Meal  - 214 give 3 units.   For Pre-Meal  - 239 give 4 units.   For Pre-Meal  - 264 give 5 units.   For Pre-Meal  - 289 give 6 units.   For Pre-Meal  - 314 give 7 units.   For Pre-Meal  - 339 give 8 units.   For Pre-Meal  - 364 give 9 units.   For Pre-Meal BG greater than or equal to 365 give 10 units  To be given with prandial insulin, and based on pre-meal blood glucose.   Notify provider if glucose greater than or equal to 350 mg/dL after administration of correction dose. If given at mealtime, administer within 30 minutes of  "start of meal  insulin aspart (NOVOLOG PEN) 100 UNIT/ML pen, Inject 1-7 Units Subcutaneous At Bedtime HIGH INSULIN RESISTANCE DOSING    Do Not give Bedtime Correction Insulin if BG less than 200.   For  - 224 give 1 units.   For  - 249 give 2 units.   For  - 274 give 3 units.   For  - 299 give 4 units.   For  - 324 give 5 units.   For  - 349 give 6 units.   For BG greater than or equal to 350 give 7 units.   Notify provider if glucose greater than or equal to 350 mg/dL after administration of correction dose. If given at mealtime, administer within 30 minutes of start of meal  mycophenolate (GENERIC EQUIVALENT) 500 MG tablet, Take 3 tablets (1,500 mg) by mouth 2 times daily  nystatin (MYCOSTATIN) 871653 UNIT/ML suspension, Take 10 mLs (1,000,000 Units) by mouth 4 times daily  pantoprazole (PROTONIX) 40 MG EC tablet, Take 1 tablet (40 mg) by mouth every morning (before breakfast)  rosuvastatin (CRESTOR) 10 MG tablet, Take 1 tablet (10 mg) by mouth daily  tacrolimus (GENERIC EQUIVALENT) 0.5 MG capsule, Total of 2 mg BID or as directed by transplant team  valGANciclovir (VALCYTE) 450 MG tablet, Take 1 tablet (450 mg) by mouth daily  melatonin 1 MG TABS tablet, Take 1 tablet (1 mg) by mouth nightly as needed for sleep (Patient not taking: Reported on 6/2/2020)        ROS:  See HPI    EXAM:  BP (!) 141/102 (BP Location: Right arm)   Temp 98.6  F (37  C) (Oral)   Resp 16   Ht 1.626 m (5' 4\")   Wt 82.1 kg (181 lb)   SpO2 98%   BMI 31.07 kg/m      GENERAL: Appears comfortable, in no acute distress.   HEENT: Eye symmetrical, no discharge or icterus bilaterally. Mucous membranes moist and without lesions. No thrush.   CV: Tachycardic per baseline and regular, +S1S2, no murmur, rub, or gallop. JVP not visible.   RESPIRATORY: Respirations regular, even, and unlabored. Lungs CTA throughout.   GI: Soft, obese and non distended with normoactive bowel sounds present in all quadrants. No " tenderness, rebound, guarding. No hepatomegaly.   EXTREMITIES: No peripheral edema. 2+ bilateral pedal pulses.   NEUROLOGIC: Alert and oriented x 3. No focal deficits.   MUSCULOSKELETAL: No joint swelling or tenderness.   SKIN: No jaundice. No rashes or lesions.  Sternum well-healed without signs of infection.  Old chest tube site dressing clean dry and intact.    Labs - reviewed with patient in clinic today:  CBC RESULTS:  Lab Results   Component Value Date    WBC 9.2 06/08/2020    RBC 3.42 (L) 06/08/2020    HGB 9.1 (L) 06/08/2020    HCT 29.6 (L) 06/08/2020    MCV 87 06/08/2020    MCH 26.6 06/08/2020    MCHC 30.7 (L) 06/08/2020    RDW 18.8 (H) 06/08/2020     06/08/2020       CMP RESULTS:  Lab Results   Component Value Date     06/08/2020    POTASSIUM 4.6 06/08/2020    CHLORIDE 109 06/08/2020    CO2 18 (L) 06/08/2020    ANIONGAP 10 06/08/2020     (H) 06/08/2020    BUN 91 (H) 06/08/2020    CR 1.74 (H) 06/08/2020    GFRESTIMATED 42 (L) 06/08/2020    GFRESTBLACK 49 (L) 06/08/2020    ARLENE 9.3 06/08/2020    BILITOTAL 0.8 06/08/2020    ALBUMIN 3.3 (L) 06/08/2020    ALKPHOS 46 06/08/2020    ALT 47 06/08/2020    AST 18 06/08/2020        INR RESULTS:  Lab Results   Component Value Date    INR 1.16 (H) 06/04/2020       LIPID RESULTS:  Lab Results   Component Value Date    CHOL 126 08/06/2018    HDL 52 08/06/2018    LDL 24 08/06/2018    TRIG 246 (H) 08/06/2018       IMMUNOSUPPRESSANT LEVELS:  Lab Results   Component Value Date    TACROL 3.9 (L) 06/04/2020    DOSTAC Not Provided 06/04/2020       No components found for: CK  Lab Results   Component Value Date    MAG 1.8 06/08/2020     Lab Results   Component Value Date    A1C 8.0 (H) 05/12/2020     Lab Results   Component Value Date    PHOS 4.2 06/08/2020     Lab Results   Component Value Date    NTBNP 404 (H) 06/26/2019     Lab Results   Component Value Date    SAITESNICOLE SA FCS 05/24/2020    LATA Class I 05/24/2020    [id number] Cw:15 05/24/2020     [id number] Cw:18 05/24/2020    SAIREPCOM  05/24/2020      Test performed by modified procedure. Serum heat inactivated and tested   by a modified ([employer]) protocol including fetal calf serum addition.   High-risk, mfi >3,000. Mod-risk, mfi 500-3,000.       Lab Results   Component Value Date    SAIITESTME SA FCS 05/24/2020    SAIICELL Class II 05/24/2020    [id number] None 05/24/2020    [id number] None 05/24/2020    SAIIREPCOM  05/24/2020      Test performed by modified procedure. Serum heat inactivated and tested   by a modified ([employer]) protocol including fetal calf serum addition.   High-risk, mfi >3,000. Mod-risk, mfi 500-3,000.       No results found for: CSPEC, CMQNT, CMLOG    Diagnostic Studies:  RHC 6/1/20  RA 9/10/8  RV 23/8  PA 23/15/18  PCWP 17/18/15  AMRIT 5.2/2.7  TD 4.9/2.6  PVR 0.6 (AMRIT)  SVR 1830 (AMRIT)    TTE 5/23/20  Technically difficult study. 1st echo after heart transplant.  Global and regional left ventricular function is hyperkinetic with an EF of  65-70%.  Right ventricular function, chamber size, wall motion, and thickness are  normal.  No pericardial effusion is present.      Assessment/Plan:  Mr. Sharp is a 59 year old male who is s/p orthotopic heart transplant on 5/18/20 who presents to for routine follow-up. 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  Postop course complicated by NSVT, leukocytosis, and C acnes growing from former LVAD site treated with 14 days of antibiotics. Donor blood growing S anginosus and Veillonella (also thought to be a contaminant, but treated with Vanco/zosyn --> Vanco/Flagyl for total 7 days).  He also had hyperkalemia, which improved following kayexalate and stopping bactrim.  His graft function remains normal by echocardiogram.  Right heart cath today shows normal filling pressures normal cardiac  output.  He does not have DSA's.  Blood pressures are elevated so we will start amlodipine today.  Transplant coordinator will touch base in a few days. We will defer angiogram next week (week 4) due to ongoing renal dysfunction (Cr 1.7 today) and plan to do this at a later time.  If tacrolimus level is low today we could change nystatin to clotrimazole.  Would require close monitoring.  He is a few weeks out from last Simulect dose.    # Status post OHT on 5/18/20, history of ICM  # Chronic immunosuppression  * Rejection history: none.   * Recent immunosuppression changes: none  * Last biopsy: 6/1 0R no AMR, only three fragments  * Intolerance to medications: none      Graft function:  - BP:  elevated consistently 140s-150s/90s, start amlodipine 5 mg daily  - HRs:  > 80  - Fluid status: euvolemic not on diuretic    Immunosuppression:  - Received Simulect 5/18 and 5/22  - MMF 1500mg bid  - tacrolimus goal level 10-12. Level today in process, last level 3.9, currently 3 mg bid.  - prednisone taper, currently 20 mg AM 15 mg in PM     PPx:  - CAV: aspirin 81mg daily, crestor 10mg daily  - PCP: Bactrim stopped 5/27 due to hyperkalemia, pentamidine given 6/1, due 6/29 versus start Bactrim  - CMV: valcyte 450mg daily (renally dosed), x6 months (through 11/2020)  - Thrush: Nystatin QID  - Osteoporosis:  alcium/vitamin D supplements  - GI: Pantoprazole 40mg daily    Serostatus:  - CMV D+/R-  - EBV D-/R+  - Toxo D-/R-    #Hyperkalemia, resolved  K has normalized since receiving kayexalate and stopping bactrim. Continue to check per protocol.     #NSVT, resolved  Postoperative, with no hemodynamic compromise.     #Leukocytosis, resolved  #C Acnes growing from his former LVAD site  #Donor positive S Anginosus and Veillonella  thought to be a contaminant, but treated with IV vanco --> po doxy through 6/1/20 (14d course total).  Donor blood grew S anginosus and Veillonella (also thought to be a contaminant), but treated with  Vanco/zosyn --> Vanco/Flagyl for a total of 7 days. WBC 9.2 today.      #Elevated PSA  Prostate MRI showed signs of BPH. He has been referred to urology.     #DM Type II   BS controlled on current regimen.           25 minutes spent face-to-face with patient, >50% in counseling and/or coordination of care as described above      Dinora Harper, LEX, NP-C  6/8/2020          [facility]

## 2020-06-08 NOTE — PROGRESS NOTES
Pt back from CCL s/p RHC and biopsy.  VSS.  Pt alert and oriented x4.  Pt denies any pain.  Pt does not want anything to eat or drink. 1038-discharge instructions given to pt by previous nurse Marjan Cedeño RN-pt has no further questions.

## 2020-06-08 NOTE — TELEPHONE ENCOUNTER
Pt's Tacrolimus level 5.6 goal 10-12.  Pt's Tacro dose increased to 3.5mg BID.  Nystatin discontinued, and pt started on Clotrimazole troches QID.  Pt to recheck Tacrolimus level on Wed. 6/10.  Prescriptions sent to [place] pharmacy for pt to .  Pt states understanding all instructions and plan of care.

## 2020-06-08 NOTE — DISCHARGE INSTRUCTIONS
[facility]                        Interventional Cardiology  Discharge Instructions   Post Right Heart Cath      AFTER YOU GO HOME:    DO drink plenty of fluids    DO resume your regular diet and medications unless otherwise instructed by your Primary Physician    Do Not scrub the procedure site vigorously    No lotion or powder to the puncture site for 3 days    CALL YOUR PRIMARY PHYSICIAN IF: You may resume all normal activity.  Monitor neck site for bleeding, swelling, or voice changes. If you notice bleeding or swelling immediately apply pressure to the site and call number below to speak with Cardiology Fellow.  If you experience any changes in your breathing you should call your doctor immediately or come to the closest Emergency Department.  Do not drive yourself.    ADDITIONAL INSTRUCTIONS: Medications: You are to resume all home medications including anticoagulation therapy unless otherwise advised by your primary cardiologist or nurse coordinator.    Follow Up: Per your primary cardiology team    If you have any questions or concerns regarding your procedure site please call [phone] at anytime and ask for Cardiology Fellow on call.  They are available 24 hours a day.  You may also contact the Cardiology Clinic after hours number at [phone].                                                       Telephone Numbers [phone] Monday-Friday 8:00 am to 4:30 pm    [phone] After 4:30 pm Monday-Friday, Weekends & Holidays  Ask for Interventional Cardiologist on call. Someone is on call 24 hours/day   [facility] toll free number [phone] Monday-Friday 8:00 am to 4:30 pm   [facility] Emergency Dept [phone]

## 2020-06-08 NOTE — IP AVS SNAPSHOT
[address] [address]                                    After Visit Summary   6/8/2020    Mariusz Sharp    MRN: [id number]           After Visit Summary Signature Page    I have received my discharge instructions, and my questions have been answered. I have discussed any challenges I see with this plan with the nurse or doctor.    ..........................................................................................................................................  Patient/Patient Representative Signature      ..........................................................................................................................................  Patient Representative Print Name and Relationship to Patient    ..................................................               ................................................  Date                                   Time    ..........................................................................................................................................  Reviewed by Signature/Title    ...................................................              ..............................................  Date                                               Time          22EPIC Rev 08/18

## 2020-06-08 NOTE — PROGRESS NOTES
Prepped for RH/Bx.  Denies pain.  Currently staying at [facility] during recovery period.  H & P current.  Labs complete.  Consent signed.

## 2020-06-08 NOTE — PROGRESS NOTES
[facility]   Interventional Cardiology        Consenting/Education for Right Heart Catheterization/Endomyocardial Biopsy     Patient understands as a part of routine surveillance after heart transplant we would like to perform a right heart catheterization/endomyocardial biopsy.  This procedure will be performed by Dr. Bacon    Patient understands during the portion for right heart catheterization a fine tube (catheter) is put into the vein of the groin/neck.  It is carefully passed along until it reaches the heart and then goes up into the blood vessels of the lungs. This is done to measure a variety of pressures in your heart and can tell us how well the heart is filling and emptying, as well as monitor fluid status. While the catheter is in your neck/groin vein, a  Biopsy forceps  or pincers at the end of the catheter are used to take the tissue samples. This is may be repeated to get enough samples. The biopsy pieces are very small (one to two millimeters).     Patient also understands risks and complications of the procedure which include, but are not limited to bruising/swelling around the incision site, infection, bleeding, allergic reaction to local anesthetic, air embolism, arterial puncture, stroke, heart attack.      Patient verbalized understanding of risks and benefits of the right heart catheterization and endomyocardial biopsy and has elected to proceed with the procedure.       Paola MORALES, SHAHRZAD  [facility] Interventional Cardiology  [phone]

## 2020-06-08 NOTE — PROGRESS NOTES
Has been seen by nurse from transplant team.  Patient tolerated recovery stage well. VSS, RIJV puncture site clean/dry/intact, no hematoma, and denies pain. Patient tolerated PO fluids, did not want food. Teaching was done and discharge instructions were given.  Patient discharged from the hospital via ambulatory to [facility].   He stopped in pharmacy for new prescription.

## 2020-06-09 ENCOUNTER — TELEPHONE (OUTPATIENT)
Dept: TRANSPLANT | Facility: CLINIC | Age: 60
End: 2020-06-09

## 2020-06-09 ENCOUNTER — HOSPITAL ENCOUNTER (OUTPATIENT)
Dept: CARDIAC REHAB | Facility: CLINIC | Age: 60
End: 2020-06-09
Attending: NURSE PRACTITIONER
Payer: COMMERCIAL

## 2020-06-09 DIAGNOSIS — Z94.1 STATUS POST HEART TRANSPLANTATION (H): ICD-10-CM

## 2020-06-09 LAB — COPATH REPORT: NORMAL

## 2020-06-09 PROCEDURE — 40000575 ZZH STATISTIC OP CARDIAC VISIT #2

## 2020-06-09 PROCEDURE — 93798 PHYS/QHP OP CAR RHAB W/ECG: CPT

## 2020-06-09 PROCEDURE — 40000116 ZZH STATISTIC OP CR VISIT

## 2020-06-09 PROCEDURE — 93797 PHYS/QHP OP CAR RHAB WO ECG: CPT

## 2020-06-09 RX ORDER — MYCOPHENOLATE MOFETIL 500 MG/1
1500 TABLET ORAL 2 TIMES DAILY
Qty: 180 TABLET | Refills: 0 | Status: SHIPPED | OUTPATIENT
Start: 2020-06-09 | End: 2020-07-06

## 2020-06-09 RX ORDER — ROSUVASTATIN CALCIUM 10 MG/1
10 TABLET, COATED ORAL DAILY
Status: CANCELLED | OUTPATIENT
Start: 2020-06-09

## 2020-06-09 NOTE — PROGRESS NOTES
"Mariusz Sharp is a 59 year old male who is being evaluated via a billable telephone visit.      The patient has been notified of following:     \"This telephone visit will be conducted via a call between you and your physician/provider. We have found that certain health care needs can be provided without the need for a physical exam.  This service lets us provide the care you need with a short phone conversation.  If a prescription is necessary we can send it directly to your pharmacy.  If lab work is needed we can place an order for that and you can then stop by our lab to have the test done at a later time.    Telephone visits are billed at different rates depending on your insurance coverage. During this emergency period, for some insurers they may be billed the same as an in-person visit.  Please reach out to your insurance provider with any questions.    If during the course of the call the physician/provider feels a telephone visit is not appropriate, you will not be charged for this service.\"    Patient has given verbal consent for Telephone visit?  Yes    What phone number would you like to be contacted at? [phone]    How would you like to obtain your AVS? ProcessUnity    Paola Beaulieu MA  Patients Glucose Data was Sent in [facility]   Due to the COVID 19 pandemic this visit was converted to a telephone visit in order to help prevent spread of infection in this patient and the general population.    Time of start: 12:30 pm  Time of end: 12:42 pm  Total duration of telephone visit: 12 minutes.  CHANELL Sharp ( Pepe ) is a 59 year old male with type 2 diabetes mellitus.  Telephone visit today for diabetes care.  Pt was admitted 5/17/2020-5/29/2020 and underwent a heart transplant on 5/18/2020.  He states he is doing well.  Pt's hx is significant for type 2 diabetes mellitus dx 3 years ago, HTN, hyperlipidemia, hx of ischemic heart disease s/p STEMI with ALYSSA 2019, LVAD placement, Stage 3 CKD, and " obesity.  Apparently heart donor blood cx and respiratory cultures were + for H flu, staph and strep.  Red was also admitted 5/12-5/15/2020 for ANDREW due to nephrolithiasis complicated by hydronephrosis/ANDREW.  Post heart transplant he received high dose steroids and he is currently on a prednisone taper 20 mg each am and 15 mg each pm.  For his diabetes, pt is currently taking Lantus 44 units subcutaneous each pm and Novolog 1 unit/4 gms CHO with meals with correction scale  ( 1 unit/25 for BG > 140 ) and bedtime correction scale ( 1 unit/25 for BG > 200).  Pt's A1C was 8.0 % on 5/12/2020 and his A1C was 9.5 % on 7/5/2019. Pt is anemic.  Pt send us blood sugar readings for review.  His blood sugar readings continue to improve, but are above target on days he does not walk.  No hypoglycemia.  His blood sugar readings are in the chart.  On ROS today, doing well. He states chest incision has healed.  No fevers or chills  Pt denies headaches, blurred vision, n/v, SOB at rest, abd pain, diarrhea,blood in the stool, melena, dysuria, hematuria or numbness in his feet or hands.  Red denies foot ulcers at this time.    Diabetes Care  Retinopathy:none; pt seen by Oph in Dec 2019.  Nephropathy:yes- hx of CKD/ANDREW.  Neuropathy: none.  Foot Exam:no exam today.  Taking aspirin: yes.  Lipids: LDL 24 in 8/2018. Pt is taking Crestor.  CAD: yes; s/p heart transplant on 5/18/2020.  Depression: denies.  Insulin: Basal and meal time insulin with correction scale ( 1 unit/25 for BG > 140 with meals and > 200 at bedtime).  Testing: glucose meter.    ROS  See under HPI.    Allergies  No Known Allergies    Medications  Current Outpatient Medications   Medication Sig Dispense Refill     ACCU-CHEK [place] PLUS test strip        acetaminophen (TYLENOL) 325 MG tablet Take 2 tablets (650 mg) by mouth every 4 hours as needed for other (multimodal surgical pain management along with NSAIDS and opioid medication as indicated based on pain control and  physical function.)       aspirin (ASA) 81 MG chewable tablet Take 1 tablet (81 mg) by mouth daily 90 tablet 3     BD SILVANA U/F 32G X 4 MM insulin pen needle Use 3-4 daily. 250 each 4     calcium carbonate 600 mg-vitamin D 400 units (CALTRATE) 600-400 MG-UNIT per tablet Take 1 tablet by mouth 2 times daily (with meals)       clotrimazole 10 MG allison Take 1 Allison (10 mg) by mouth 4 times daily 360 Allison 1     insulin aspart (NOVOLOG PEN) 100 UNIT/ML pen 1 unit / 4 gms CHO with meals,plus correction insulin. Pt uses approx 40 units in 24 hrs. 3 mL 3     insulin aspart (NOVOLOG PEN) 100 UNIT/ML pen Correction Scale - HIGH INSULIN RESISTANCE DOSING     Do Not give Correction Insulin if Pre-Meal BG less than 140.   For Pre-Meal  - 164 give 1 unit.   For Pre-Meal  - 189 give 2 units.   For Pre-Meal  - 214 give 3 units.   For Pre-Meal  - 239 give 4 units.   For Pre-Meal  - 264 give 5 units.   For Pre-Meal  - 289 give 6 units.   For Pre-Meal  - 314 give 7 units.   For Pre-Meal  - 339 give 8 units.   For Pre-Meal  - 364 give 9 units.   For Pre-Meal BG greater than or equal to 365 give 10 units  To be given with prandial insulin, and based on pre-meal blood glucose.   Notify provider if glucose greater than or equal to 350 mg/dL after administration of correction dose. If given at mealtime, administer within 30 minutes of start of meal 3 mL 0     insulin aspart (NOVOLOG PEN) 100 UNIT/ML pen Inject 1-7 Units Subcutaneous At Bedtime HIGH INSULIN RESISTANCE DOSING    Do Not give Bedtime Correction Insulin if BG less than 200.   For  - 224 give 1 units.   For  - 249 give 2 units.   For  - 274 give 3 units.   For  - 299 give 4 units.   For  - 324 give 5 units.   For  - 349 give 6 units.   For BG greater than or equal to 350 give 7 units.   Notify provider if glucose greater than or equal to 350 mg/dL after administration of correction dose.  If given at mealtime, administer within 30 minutes of start of meal 3 mL 0     insulin glargine (LANTUS PEN) 100 UNIT/ML pen Inject 44 units subcutaneous daily. 3 mL 0     mycophenolate (GENERIC EQUIVALENT) 500 MG tablet Take 3 tablets (1,500 mg) by mouth 2 times daily 180 tablet 0     pantoprazole (PROTONIX) 40 MG EC tablet Take 1 tablet (40 mg) by mouth every morning (before breakfast) 30 tablet 1     rosuvastatin (CRESTOR) 10 MG tablet Take 1 tablet (10 mg) by mouth daily 30 tablet 0     tacrolimus (GENERIC EQUIVALENT) 0.5 MG capsule Take one 0.5mg capsule WITH three 1mg capsules to equal 3.5mg every morning and evening. 120 capsule 0     amLODIPine (NORVASC) 5 MG tablet Take 1.5 tablets (7.5 mg) by mouth At Bedtime 90 tablet 3     predniSONE (DELTASONE) 10 MG tablet Take 2 tablets (20 mg) by mouth every morning AND 1.5 tablets (15 mg) every evening. 120 tablet 0     tacrolimus (GENERIC EQUIVALENT) 1 MG capsule Take 3 capsules with one 0.5mg capule (3.5 mg) by mouth every morning AND 3 capsules  (3mg) every evening. 150 capsule 1     valGANciclovir (VALCYTE) 450 MG tablet Take 1 tablet (450 mg) by mouth daily 30 tablet 0       Family History  Mother and father with hx of type 2 DM.  Social History   reports that he has never smoked. He has never used smokeless tobacco. He reports that he does not drink alcohol or use drugs.     Past Medical History  Past Medical History:   Diagnosis Date     Acute kidney injury (H)      CKD (chronic kidney disease)      Coronary artery disease      Diabetes mellitus (H)      HTN (hypertension)      Hyperlipidemia      Ischemic cardiomyopathy      LV (left ventricular) mural thrombus      Paroxysmal atrial flutter (H)      RVF (right ventricular failure) (H)      Systolic heart failure (H)      Ventricular tachycardia (H)        Past Surgical History:   Procedure Laterality Date     COLONOSCOPY N/A 12/7/2018    Procedure: COLONOSCOPY;  Surgeon: Krish Mercado MD;  Location:  OR      CV HEART BIOPSY N/A 5/24/2020    Procedure: Heart Biopsy;  Surgeon: Kit Bacon MD;  Location:  HEART CARDIAC CATH LAB     CV HEART BIOPSY N/A 6/1/2020    Procedure: CV HEART BIOPSY;  Surgeon: Kit Bacon MD;  Location:  HEART CARDIAC CATH LAB     CV HEART BIOPSY N/A 6/8/2020    Procedure: CV HEART BIOPSY;  Surgeon: Kit Bacon MD;  Location:  HEART CARDIAC CATH LAB     CV RIGHT HEART CATH N/A 2/19/2019    Procedure: RHC;  Surgeon: Brian Long MD;  Location:  HEART CARDIAC CATH LAB     CV RIGHT HEART CATH N/A 7/5/2019    Procedure: CV RIGHT HEART CATH;  Surgeon: Khris Mcclelland MD;  Location:  HEART CARDIAC CATH LAB     CV RIGHT HEART CATH N/A 5/24/2020    Procedure: Right Heart Cath;  Surgeon: Kit Bacon MD;  Location:  HEART CARDIAC CATH LAB     CV RIGHT HEART CATH N/A 6/1/2020    Procedure: CV RIGHT HEART CATH;  Surgeon: Kit Bacon MD;  Location:  HEART CARDIAC CATH LAB     CV RIGHT HEART CATH N/A 6/8/2020    Procedure: CV RIGHT HEART CATH;  Surgeon: Kit Bacon MD;  Location: [facility] CARDIAC CATH LAB     HC PRQ TRLUML CORONARY ANGIOPLASTY ADDL BRANCH      PCI and ALYSSA to ostial LAD on 6/27/18     IMPLANT AUTOMATIC IMPLANTABLE CARDIOVERTER DEFIBRILLATOR       INSERT VENTRICULAR ASSIST DEVICE LEFT (HEARTMATE II) N/A 12/31/2018    Procedure: Median Sternotomy, On Cardiopulmonary Bypass Pump, Insertion of Left Ventricular Assist Device (Heartmate III);  Surgeon: Kamaljit Baker MD;  Location:  OR     PICC DOUBLE LUMEN PLACEMENT Right 05/22/2020    5FR DL PICC inserted at right basilic vein, length 38cm.     TRANSPLANT HEART RECIPIENT AFTER HEART MATE N/A 5/18/2020    Procedure: REDO MEDIAN STERNOTOMY, LYSIS OF ADHESIONS, ON CARDIOPULMONARY BYPASS PUMP, HEART TRANSPLANT RECIPIENT, REMOVAL OF LEFT VENTRICULAR ASSIST DEVICE, REMOVAL OF AUTOMATED IMPLANTABLE CARDIOVERTER  DEFIBRILLATOR;  Surgeon: Elder Willis MD;  Location: [facility] OR       Physical Exam    No exam today.    RESULTS  Creatinine   Date Value Ref Range Status   06/15/2020 1.89 (H) 0.66 - 1.25 mg/dL Final     GFR Estimate   Date Value Ref Range Status   06/15/2020 38 (L) >60 mL/min/[1.73_m2] Final     Comment:     Non  GFR Calc  Starting 12/18/2018, serum creatinine based estimated GFR (eGFR) will be   calculated using the Chronic Kidney Disease Epidemiology Collaboration   (CKD-EPI) equation.       Hemoglobin A1C   Date Value Ref Range Status   06/15/2020 7.8 (H) 0 - 5.6 % Final     Comment:     Normal <5.7% Prediabetes 5.7-6.4%  Diabetes 6.5% or higher - adopted from ADA   consensus guidelines.       Potassium   Date Value Ref Range Status   06/15/2020 4.8 3.4 - 5.3 mmol/L Final     ALT   Date Value Ref Range Status   06/15/2020 50 0 - 70 U/L Final     AST   Date Value Ref Range Status   06/15/2020 24 0 - 45 U/L Final     TSH   Date Value Ref Range Status   06/26/2019 3.94 0.40 - 4.00 mU/L Final     T4 Free   Date Value Ref Range Status   07/23/2018 1.05 0.76 - 1.46 ng/dL Final       Cholesterol   Date Value Ref Range Status   06/15/2020 183 <200 mg/dL Final   08/06/2018 126 <200 mg/dL Final     HDL Cholesterol   Date Value Ref Range Status   06/15/2020 86 >39 mg/dL Final   08/06/2018 52 >39 mg/dL Final     LDL Cholesterol Calculated   Date Value Ref Range Status   06/15/2020 82 <100 mg/dL Final     Comment:     Desirable:       <100 mg/dl   08/06/2018 24 <100 mg/dL Final     Comment:     Desirable:       <100 mg/dl     Triglycerides   Date Value Ref Range Status   06/15/2020 73 <150 mg/dL Final   08/06/2018 246 (H) <150 mg/dL Final     Comment:     Borderline high:  150-199 mg/dl  High:             200-499 mg/dl  Very high:       >499 mg/dl       A1C   8.0 % 5/12/2020    ASSESSMENT/PLAN:      1.  TYPE 2 DIABETES MELLITUS: Type 2 diabetes mellitus complicated by hx of CAD and ischemic  cardiomyopathy s/p heart transplant on 5/18/2020. Pt is doing well at this time and remains on a prednisone taper 20 mg each am and 15 mg each pm.  Red's blood sugar values are above target.  Pt instructed to increase Lantus 45 units daily and continue Novolog I/C ratio to 1:4 with current correction scale ( 1 unit/25 for BG > 140 ).  I plan to call Red next week 6/19/2020 to review his blood sugar readings.  Pt was seen by Oph in Dec 2019 without retinopathy.  He denies any sx of neuropathy or foot ulcers at this time.  He is taking ASA daily.    2.  CKD/ANDREW: Most recent creat was 1.72 with GFR 43 mL/min on 6/10/2020.    3.  HX OF ISCHEMIC CARDIOMYOPATHY: S/P heart transplant on 5/18/2020 and followed closely by transplant staff here.    4.  FOLLOW UP : with me next week on 6/19/2020.

## 2020-06-09 NOTE — TELEPHONE ENCOUNTER
Returned call to pt regarding medications.  Pt states he will be out of his MMF at the end of this week.  Refill sent to [facility] pharmacy for pt to  today.

## 2020-06-09 NOTE — TELEPHONE ENCOUNTER
Patient Call: Twan  Date/Time: 6/9/2020-10:18am   Reason for call: Please connect with pt regarding Medication

## 2020-06-10 ENCOUNTER — VIRTUAL VISIT (OUTPATIENT)
Dept: ENDOCRINOLOGY | Facility: CLINIC | Age: 60
End: 2020-06-10
Payer: COMMERCIAL

## 2020-06-10 DIAGNOSIS — Z79.4 TYPE 2 DIABETES MELLITUS WITH HYPERGLYCEMIA, WITH LONG-TERM CURRENT USE OF INSULIN (H): Primary | ICD-10-CM

## 2020-06-10 DIAGNOSIS — Z94.1 HEART REPLACED BY TRANSPLANT (H): ICD-10-CM

## 2020-06-10 DIAGNOSIS — E11.65 TYPE 2 DIABETES MELLITUS WITH HYPERGLYCEMIA, WITH LONG-TERM CURRENT USE OF INSULIN (H): Primary | ICD-10-CM

## 2020-06-10 LAB
ALBUMIN SERPL-MCNC: 3.3 G/DL (ref 3.4–5)
ALP SERPL-CCNC: 46 U/L (ref 40–150)
ALT SERPL W P-5'-P-CCNC: 49 U/L (ref 0–70)
ANION GAP SERPL CALCULATED.3IONS-SCNC: 9 MMOL/L (ref 3–14)
AST SERPL W P-5'-P-CCNC: 16 U/L (ref 0–45)
BILIRUB SERPL-MCNC: 0.5 MG/DL (ref 0.2–1.3)
BUN SERPL-MCNC: 87 MG/DL (ref 7–30)
CALCIUM SERPL-MCNC: 9.2 MG/DL (ref 8.5–10.1)
CHLORIDE SERPL-SCNC: 109 MMOL/L (ref 94–109)
CO2 SERPL-SCNC: 20 MMOL/L (ref 20–32)
CREAT SERPL-MCNC: 1.72 MG/DL (ref 0.66–1.25)
GFR SERPL CREATININE-BSD FRML MDRD: 43 ML/MIN/{1.73_M2}
GLUCOSE SERPL-MCNC: 229 MG/DL (ref 70–99)
MDC_IDC_LEAD_IMPLANT_DT: NORMAL
MDC_IDC_LEAD_IMPLANT_DT: NORMAL
MDC_IDC_LEAD_LOCATION: NORMAL
MDC_IDC_LEAD_LOCATION: NORMAL
MDC_IDC_LEAD_LOCATION_DETAIL_1: NORMAL
MDC_IDC_LEAD_LOCATION_DETAIL_1: NORMAL
MDC_IDC_LEAD_MFG: NORMAL
MDC_IDC_LEAD_MFG: NORMAL
MDC_IDC_LEAD_MODEL: NORMAL
MDC_IDC_LEAD_MODEL: NORMAL
MDC_IDC_LEAD_POLARITY_TYPE: NORMAL
MDC_IDC_LEAD_POLARITY_TYPE: NORMAL
MDC_IDC_LEAD_SERIAL: NORMAL
MDC_IDC_LEAD_SERIAL: NORMAL
MDC_IDC_LEAD_SPECIAL_FUNCTION: NORMAL
MDC_IDC_LEAD_SPECIAL_FUNCTION: NORMAL
MDC_IDC_MSMT_BATTERY_DTM: NORMAL
MDC_IDC_MSMT_BATTERY_REMAINING_LONGEVITY: 144 MO
MDC_IDC_MSMT_BATTERY_STATUS: NORMAL
MDC_IDC_MSMT_CAP_CHARGE_DTM: NORMAL
MDC_IDC_MSMT_CAP_CHARGE_ENERGY: 41 J
MDC_IDC_MSMT_CAP_CHARGE_TIME: 8.39
MDC_IDC_MSMT_CAP_CHARGE_TIME: 9.56
MDC_IDC_MSMT_CAP_CHARGE_TYPE: NORMAL
MDC_IDC_MSMT_CAP_CHARGE_TYPE: NORMAL
MDC_IDC_MSMT_LEADCHNL_RV_IMPEDANCE_VALUE: 969 OHM
MDC_IDC_MSMT_LEADCHNL_RV_PACING_THRESHOLD_AMPLITUDE: 0.6 V
MDC_IDC_MSMT_LEADCHNL_RV_PACING_THRESHOLD_PULSEWIDTH: 1 MS
MDC_IDC_MSMT_LEADCHNL_RV_SENSING_INTR_AMPL: 19.8 MV
MDC_IDC_PG_IMPLANT_DTM: NORMAL
MDC_IDC_PG_MFG: NORMAL
MDC_IDC_PG_MODEL: NORMAL
MDC_IDC_PG_SERIAL: NORMAL
MDC_IDC_PG_TYPE: NORMAL
MDC_IDC_SESS_CLINIC_NAME: NORMAL
MDC_IDC_SESS_DTM: NORMAL
MDC_IDC_SESS_TYPE: NORMAL
MDC_IDC_SET_BRADY_HYSTRATE: NORMAL
MDC_IDC_SET_BRADY_LOWRATE: 40 {BEATS}/MIN
MDC_IDC_SET_BRADY_MODE: NORMAL
MDC_IDC_SET_LEADCHNL_RV_PACING_AMPLITUDE: 2.2 V
MDC_IDC_SET_LEADCHNL_RV_PACING_CAPTURE_MODE: NORMAL
MDC_IDC_SET_LEADCHNL_RV_PACING_POLARITY: NORMAL
MDC_IDC_SET_LEADCHNL_RV_PACING_PULSEWIDTH: 1 MS
MDC_IDC_SET_LEADCHNL_RV_SENSING_ADAPTATION_MODE: NORMAL
MDC_IDC_SET_LEADCHNL_RV_SENSING_POLARITY: NORMAL
MDC_IDC_SET_LEADCHNL_RV_SENSING_SENSITIVITY: 0.6 MV
MDC_IDC_SET_ZONE_DETECTION_INTERVAL: 300 MS
MDC_IDC_SET_ZONE_DETECTION_INTERVAL: 375 MS
MDC_IDC_SET_ZONE_TYPE: NORMAL
MDC_IDC_SET_ZONE_VENDOR_TYPE: NORMAL
MDC_IDC_STAT_BRADY_DTM_END: NORMAL
MDC_IDC_STAT_BRADY_DTM_START: NORMAL
MDC_IDC_STAT_BRADY_RV_PERCENT_PACED: 1 %
MDC_IDC_STAT_EPISODE_RECENT_COUNT: 0
MDC_IDC_STAT_EPISODE_RECENT_COUNT_DTM_END: NORMAL
MDC_IDC_STAT_EPISODE_RECENT_COUNT_DTM_START: NORMAL
MDC_IDC_STAT_EPISODE_TOTAL_COUNT: 0
MDC_IDC_STAT_EPISODE_TOTAL_COUNT: 49
MDC_IDC_STAT_EPISODE_TOTAL_COUNT: 6
MDC_IDC_STAT_EPISODE_TOTAL_COUNT_DTM_END: NORMAL
MDC_IDC_STAT_EPISODE_TYPE: NORMAL
MDC_IDC_STAT_EPISODE_VENDOR_TYPE: NORMAL
MDC_IDC_STAT_TACHYTHERAPY_ATP_DELIVERED_RECENT: 0
MDC_IDC_STAT_TACHYTHERAPY_ATP_DELIVERED_TOTAL: 9
MDC_IDC_STAT_TACHYTHERAPY_RECENT_DTM_END: NORMAL
MDC_IDC_STAT_TACHYTHERAPY_RECENT_DTM_START: NORMAL
MDC_IDC_STAT_TACHYTHERAPY_SHOCKS_ABORTED_RECENT: 0
MDC_IDC_STAT_TACHYTHERAPY_SHOCKS_ABORTED_TOTAL: 0
MDC_IDC_STAT_TACHYTHERAPY_SHOCKS_DELIVERED_RECENT: 0
MDC_IDC_STAT_TACHYTHERAPY_SHOCKS_DELIVERED_TOTAL: 5
MDC_IDC_STAT_TACHYTHERAPY_TOTAL_DTM_END: NORMAL
POTASSIUM SERPL-SCNC: 5.1 MMOL/L (ref 3.4–5.3)
PROT SERPL-MCNC: 6.2 G/DL (ref 6.8–8.8)
SODIUM SERPL-SCNC: 138 MMOL/L (ref 133–144)
TACROLIMUS BLD-MCNC: 9.6 UG/L (ref 5–15)
TME LAST DOSE: NORMAL H

## 2020-06-10 NOTE — LETTER
"6/10/2020       RE: Mariusz Sharp  [address] [facility] [address]     Dear Colleague,    Thank you for referring your patient, Mariusz Sharp, to the [facility] ENDOCRINOLOGY at [facility]. Please see a copy of my visit note below.    Mariusz Sharp is a 59 year old male who is being evaluated via a billable telephone visit.      The patient has been notified of following:     \"This telephone visit will be conducted via a call between you and your physician/provider. We have found that certain health care needs can be provided without the need for a physical exam.  This service lets us provide the care you need with a short phone conversation.  If a prescription is necessary we can send it directly to your pharmacy.  If lab work is needed we can place an order for that and you can then stop by our lab to have the test done at a later time.    Telephone visits are billed at different rates depending on your insurance coverage. During this emergency period, for some insurers they may be billed the same as an in-person visit.  Please reach out to your insurance provider with any questions.    If during the course of the call the physician/provider feels a telephone visit is not appropriate, you will not be charged for this service.\"    Patient has given verbal consent for Telephone visit?  Yes    What phone number would you like to be contacted at? [phone]    How would you like to obtain your AVS? Lucuis Beaulieu MA  Patients Glucose Data was Sent in [employer]   Due to the COVID 19 pandemic this visit was converted to a telephone visit in order to help prevent spread of infection in this patient and the general population.    Time of start: 12:30 pm  Time of end: 12:42 pm  Total duration of telephone visit: 12 minutes.  HPI  Mariusz ( Red ) ISABEL Sharp is a 59 year old male with type 2 diabetes mellitus.  Telephone visit today for diabetes care.  Pt was admitted " 5/17/2020-5/29/2020 and underwent a heart transplant on 5/18/2020.  He states he is doing well.  Pt's hx is significant for type 2 diabetes mellitus dx 3 years ago, HTN, hyperlipidemia, hx of ischemic heart disease s/p STEMI with ALYSSA 2019, LVAD placement, Stage 3 CKD, and obesity.  Apparently heart donor blood cx and respiratory cultures were + for H flu, staph and strep.  Red was also admitted 5/12-5/15/2020 for ANDREW due to nephrolithiasis complicated by hydronephrosis/ANDREW.  Post heart transplant he received high dose steroids and he is currently on a prednisone taper 20 mg each am and 15 mg each pm.  For his diabetes, pt is currently taking Lantus 44 units subcutaneous each pm and Novolog 1 unit/4 gms CHO with meals with correction scale  ( 1 unit/25 for BG > 140 ) and bedtime correction scale ( 1 unit/25 for BG > 200).  Pt's A1C was 8.0 % on 5/12/2020 and his A1C was 9.5 % on 7/5/2019. Pt is anemic.  Pt send us blood sugar readings for review.  His blood sugar readings continue to improve, but are above target on days he does not walk.  No hypoglycemia.  His blood sugar readings are in the chart.  On ROS today, doing well. He states chest incision has healed.  No fevers or chills  Pt denies headaches, blurred vision, n/v, SOB at rest, abd pain, diarrhea,blood in the stool, melena, dysuria, hematuria or numbness in his feet or hands.  Red denies foot ulcers at this time.    Diabetes Care  Retinopathy:none; pt seen by Oph in Dec 2019.  Nephropathy:yes- hx of CKD/ANDREW.  Neuropathy: none.  Foot Exam:no exam today.  Taking aspirin: yes.  Lipids: LDL 24 in 8/2018. Pt is taking Crestor.  CAD: yes; s/p heart transplant on 5/18/2020.  Depression: denies.  Insulin: Basal and meal time insulin with correction scale ( 1 unit/25 for BG > 140 with meals and > 200 at bedtime).  Testing: glucose meter.    ROS  See under HPI.    Allergies  No Known Allergies    Medications  Current Outpatient Medications   Medication Sig Dispense  Refill     ACCU-CHEK CHARLOTTE PLUS test strip        acetaminophen (TYLENOL) 325 MG tablet Take 2 tablets (650 mg) by mouth every 4 hours as needed for other (multimodal surgical pain management along with NSAIDS and opioid medication as indicated based on pain control and physical function.)       aspirin (ASA) 81 MG chewable tablet Take 1 tablet (81 mg) by mouth daily 90 tablet 3     BD SILVANA U/F 32G X 4 MM insulin pen needle Use 3-4 daily. 250 each 4     calcium carbonate 600 mg-vitamin D 400 units (CALTRATE) 600-400 MG-UNIT per tablet Take 1 tablet by mouth 2 times daily (with meals)       clotrimazole 10 MG allison Take 1 Allison (10 mg) by mouth 4 times daily 360 Allison 1     insulin aspart (NOVOLOG PEN) 100 UNIT/ML pen 1 unit / 4 gms CHO with meals,plus correction insulin. Pt uses approx 40 units in 24 hrs. 3 mL 3     insulin aspart (NOVOLOG PEN) 100 UNIT/ML pen Correction Scale - HIGH INSULIN RESISTANCE DOSING     Do Not give Correction Insulin if Pre-Meal BG less than 140.   For Pre-Meal  - 164 give 1 unit.   For Pre-Meal  - 189 give 2 units.   For Pre-Meal  - 214 give 3 units.   For Pre-Meal  - 239 give 4 units.   For Pre-Meal  - 264 give 5 units.   For Pre-Meal  - 289 give 6 units.   For Pre-Meal  - 314 give 7 units.   For Pre-Meal  - 339 give 8 units.   For Pre-Meal  - 364 give 9 units.   For Pre-Meal BG greater than or equal to 365 give 10 units  To be given with prandial insulin, and based on pre-meal blood glucose.   Notify provider if glucose greater than or equal to 350 mg/dL after administration of correction dose. If given at mealtime, administer within 30 minutes of start of meal 3 mL 0     insulin aspart (NOVOLOG PEN) 100 UNIT/ML pen Inject 1-7 Units Subcutaneous At Bedtime HIGH INSULIN RESISTANCE DOSING    Do Not give Bedtime Correction Insulin if BG less than 200.   For  - 224 give 1 units.   For  - 249 give 2 units.   For  -  274 give 3 units.   For  - 299 give 4 units.   For  - 324 give 5 units.   For  - 349 give 6 units.   For BG greater than or equal to 350 give 7 units.   Notify provider if glucose greater than or equal to 350 mg/dL after administration of correction dose. If given at mealtime, administer within 30 minutes of start of meal 3 mL 0     insulin glargine (LANTUS PEN) 100 UNIT/ML pen Inject 44 units subcutaneous daily. 3 mL 0     mycophenolate (GENERIC EQUIVALENT) 500 MG tablet Take 3 tablets (1,500 mg) by mouth 2 times daily 180 tablet 0     pantoprazole (PROTONIX) 40 MG EC tablet Take 1 tablet (40 mg) by mouth every morning (before breakfast) 30 tablet 1     rosuvastatin (CRESTOR) 10 MG tablet Take 1 tablet (10 mg) by mouth daily 30 tablet 0     tacrolimus (GENERIC EQUIVALENT) 0.5 MG capsule Take one 0.5mg capsule WITH three 1mg capsules to equal 3.5mg every morning and evening. 120 capsule 0     amLODIPine (NORVASC) 5 MG tablet Take 1.5 tablets (7.5 mg) by mouth At Bedtime 90 tablet 3     predniSONE (DELTASONE) 10 MG tablet Take 2 tablets (20 mg) by mouth every morning AND 1.5 tablets (15 mg) every evening. 120 tablet 0     tacrolimus (GENERIC EQUIVALENT) 1 MG capsule Take 3 capsules with one 0.5mg capule (3.5 mg) by mouth every morning AND 3 capsules  (3mg) every evening. 150 capsule 1     valGANciclovir (VALCYTE) 450 MG tablet Take 1 tablet (450 mg) by mouth daily 30 tablet 0       Family History  Mother and father with hx of type 2 DM.  Social History   reports that he has never smoked. He has never used smokeless tobacco. He reports that he does not drink alcohol or use drugs.     Past Medical History  Past Medical History:   Diagnosis Date     Acute kidney injury (H)      CKD (chronic kidney disease)      Coronary artery disease      Diabetes mellitus (H)      HTN (hypertension)      Hyperlipidemia      Ischemic cardiomyopathy      LV (left ventricular) mural thrombus      Paroxysmal atrial  flutter (H)      RVF (right ventricular failure) (H)      Systolic heart failure (H)      Ventricular tachycardia (H)        Past Surgical History:   Procedure Laterality Date     COLONOSCOPY N/A 12/7/2018    Procedure: COLONOSCOPY;  Surgeon: Krish Mercado MD;  Location: [facility] OR     CV HEART BIOPSY N/A 5/24/2020    Procedure: Heart Biopsy;  Surgeon: Kit Bacon MD;  Location:  HEART CARDIAC CATH LAB     CV HEART BIOPSY N/A 6/1/2020    Procedure: CV HEART BIOPSY;  Surgeon: Kit Bacon MD;  Location:  HEART CARDIAC CATH LAB     CV HEART BIOPSY N/A 6/8/2020    Procedure: CV HEART BIOPSY;  Surgeon: Kit Bacon MD;  Location:  HEART CARDIAC CATH LAB     CV RIGHT HEART CATH N/A 2/19/2019    Procedure: RHC;  Surgeon: Brian Long MD;  Location:  HEART CARDIAC CATH LAB     CV RIGHT HEART CATH N/A 7/5/2019    Procedure: CV RIGHT HEART CATH;  Surgeon: Khris Mcclelland MD;  Location:  HEART CARDIAC CATH LAB     CV RIGHT HEART CATH N/A 5/24/2020    Procedure: Right Heart Cath;  Surgeon: Kit Bacon MD;  Location:  HEART CARDIAC CATH LAB     CV RIGHT HEART CATH N/A 6/1/2020    Procedure: CV RIGHT HEART CATH;  Surgeon: Kit Bacon MD;  Location:  HEART CARDIAC CATH LAB     CV RIGHT HEART CATH N/A 6/8/2020    Procedure: CV RIGHT HEART CATH;  Surgeon: Kit Bacon MD;  Location:  HEART CARDIAC CATH LAB     HC PRQ TRLUML CORONARY ANGIOPLASTY ADDL BRANCH      PCI and ALYSSA to ostial LAD on 6/27/18     IMPLANT AUTOMATIC IMPLANTABLE CARDIOVERTER DEFIBRILLATOR       INSERT VENTRICULAR ASSIST DEVICE LEFT (HEARTMATE II) N/A 12/31/2018    Procedure: Median Sternotomy, On Cardiopulmonary Bypass Pump, Insertion of Left Ventricular Assist Device (Heartmate III);  Surgeon: Kamaljit Baker MD;  Location: [facility] OR     PICC DOUBLE LUMEN PLACEMENT Right 05/22/2020    5FR DL PICC inserted at right basilic vein,  length 38cm.     TRANSPLANT HEART RECIPIENT AFTER HEART MATE N/A 5/18/2020    Procedure: REDO MEDIAN STERNOTOMY, LYSIS OF ADHESIONS, ON CARDIOPULMONARY BYPASS PUMP, HEART TRANSPLANT RECIPIENT, REMOVAL OF LEFT VENTRICULAR ASSIST DEVICE, REMOVAL OF AUTOMATED IMPLANTABLE CARDIOVERTER DEFIBRILLATOR;  Surgeon: Elder Willis MD;  Location: [facility] OR       Physical Exam    No exam today.    RESULTS  Creatinine   Date Value Ref Range Status   06/15/2020 1.89 (H) 0.66 - 1.25 mg/dL Final     GFR Estimate   Date Value Ref Range Status   06/15/2020 38 (L) >60 mL/min/[1.73_m2] Final     Comment:     Non  GFR Calc  Starting 12/18/2018, serum creatinine based estimated GFR (eGFR) will be   calculated using the Chronic Kidney Disease Epidemiology Collaboration   (CKD-EPI) equation.       Hemoglobin A1C   Date Value Ref Range Status   06/15/2020 7.8 (H) 0 - 5.6 % Final     Comment:     Normal <5.7% Prediabetes 5.7-6.4%  Diabetes 6.5% or higher - adopted from ADA   consensus guidelines.       Potassium   Date Value Ref Range Status   06/15/2020 4.8 3.4 - 5.3 mmol/L Final     ALT   Date Value Ref Range Status   06/15/2020 50 0 - 70 U/L Final     AST   Date Value Ref Range Status   06/15/2020 24 0 - 45 U/L Final     TSH   Date Value Ref Range Status   06/26/2019 3.94 0.40 - 4.00 mU/L Final     T4 Free   Date Value Ref Range Status   07/23/2018 1.05 0.76 - 1.46 ng/dL Final       Cholesterol   Date Value Ref Range Status   06/15/2020 183 <200 mg/dL Final   08/06/2018 126 <200 mg/dL Final     HDL Cholesterol   Date Value Ref Range Status   06/15/2020 86 >39 mg/dL Final   08/06/2018 52 >39 mg/dL Final     LDL Cholesterol Calculated   Date Value Ref Range Status   06/15/2020 82 <100 mg/dL Final     Comment:     Desirable:       <100 mg/dl   08/06/2018 24 <100 mg/dL Final     Comment:     Desirable:       <100 mg/dl     Triglycerides   Date Value Ref Range Status   06/15/2020 73 <150 mg/dL Final   08/06/2018 246 (H)  <150 mg/dL Final     Comment:     Borderline high:  150-199 mg/dl  High:             200-499 mg/dl  Very high:       >499 mg/dl       A1C   8.0 % 5/12/2020    ASSESSMENT/PLAN:      1.  TYPE 2 DIABETES MELLITUS: Type 2 diabetes mellitus complicated by hx of CAD and ischemic cardiomyopathy s/p heart transplant on 5/18/2020. Pt is doing well at this time and remains on a prednisone taper 20 mg each am and 15 mg each pm.  Red's blood sugar values are above target.  Pt instructed to increase Lantus 45 units daily and continue Novolog I/C ratio to 1:4 with current correction scale ( 1 unit/25 for BG > 140 ).  I plan to call Red next week 6/19/2020 to review his blood sugar readings.  Pt was seen by Oph in Dec 2019 without retinopathy.  He denies any sx of neuropathy or foot ulcers at this time.  He is taking ASA daily.    2.  CKD/ANDREW: Most recent creat was 1.72 with GFR 43 mL/min on 6/10/2020.    3.  HX OF ISCHEMIC CARDIOMYOPATHY: S/P heart transplant on 5/18/2020 and followed closely by transplant staff here.    4.  FOLLOW UP : with me next week on 6/19/2020.      06/05/2020  215  299  104  171  258    06/06/2020  230  293  96  136  147  214    06/07/2020  196  85  142  98      06/08/2020  204  317  152    06/09/2020  198  234  364    06/10/2020  228  243    Again, thank you for allowing me to participate in the care of your patient.      Sincerely,    Jeannette Schafer PA-C

## 2020-06-10 NOTE — PATIENT INSTRUCTIONS
Increase Lantus 45 units daily.  No change in Novolog doses.  I will call you on 6/19/2020 at 10:30 am.  Jeannette Schafer

## 2020-06-10 NOTE — RESULT ENCOUNTER NOTE
Tacrolimus level 9.6.  Goal 10-12.  Pt to remain on current dose of 3.5mg of Tacrolimus BID.  Recheck next Monday before cath lab visit.  Pt states understanding instructions.

## 2020-06-10 NOTE — PROGRESS NOTES
06/05/2020  215  299  104  171  258    06/06/2020  230  293  96  136  147  214    06/07/2020  196  85  142  98      06/08/2020  204  317  152    06/09/2020  198  234  364    06/10/2020  228  243

## 2020-06-10 NOTE — RESULT ENCOUNTER NOTE
Heart Biopsy negative.  Pt called with results and instructed to decrease Prednisone dose to 15mg BID.  Pt states understanding.

## 2020-06-11 ENCOUNTER — PRE VISIT (OUTPATIENT)
Dept: TRANSPLANT | Facility: CLINIC | Age: 60
End: 2020-06-11

## 2020-06-11 ENCOUNTER — HOSPITAL ENCOUNTER (OUTPATIENT)
Dept: CARDIAC REHAB | Facility: CLINIC | Age: 60
End: 2020-06-11
Attending: NURSE PRACTITIONER
Payer: COMMERCIAL

## 2020-06-11 ENCOUNTER — TELEPHONE (OUTPATIENT)
Dept: TRANSPLANT | Facility: CLINIC | Age: 60
End: 2020-06-11

## 2020-06-11 DIAGNOSIS — Z94.1 STATUS POST HEART TRANSPLANTATION (H): Primary | ICD-10-CM

## 2020-06-11 DIAGNOSIS — Z13.220 LIPID SCREENING: ICD-10-CM

## 2020-06-11 DIAGNOSIS — E11.9 DIABETES MELLITUS, TYPE 2 (H): ICD-10-CM

## 2020-06-11 PROCEDURE — 40000116 ZZH STATISTIC OP CR VISIT

## 2020-06-11 PROCEDURE — 93798 PHYS/QHP OP CAR RHAB W/ECG: CPT

## 2020-06-11 RX ORDER — LIDOCAINE 40 MG/G
CREAM TOPICAL
Status: CANCELLED | OUTPATIENT
Start: 2020-06-11

## 2020-06-11 NOTE — TELEPHONE ENCOUNTER
Called Covid testing hotline [phone]), and spoke with Sangeetha.  Pt is not to have any Covid testing done per Heart Transplant protocol.  Sangeetha states she will update system to note that pt does not need Covid testing at this time.

## 2020-06-12 ENCOUNTER — TELEPHONE (OUTPATIENT)
Dept: CARDIOLOGY | Facility: CLINIC | Age: 60
End: 2020-06-12

## 2020-06-12 NOTE — TELEPHONE ENCOUNTER
Call complete for pre procedure reminder, travel screen and no visitor policy.    Heart tx 5/2020- no COVID test needed

## 2020-06-15 ENCOUNTER — TELEPHONE (OUTPATIENT)
Dept: TRANSPLANT | Facility: CLINIC | Age: 60
End: 2020-06-15

## 2020-06-15 ENCOUNTER — APPOINTMENT (OUTPATIENT)
Dept: LAB | Facility: CLINIC | Age: 60
End: 2020-06-15
Attending: INTERNAL MEDICINE
Payer: COMMERCIAL

## 2020-06-15 ENCOUNTER — HOSPITAL ENCOUNTER (OUTPATIENT)
Dept: GENERAL RADIOLOGY | Facility: CLINIC | Age: 60
End: 2020-06-15
Attending: INTERNAL MEDICINE
Payer: COMMERCIAL

## 2020-06-15 ENCOUNTER — SURGERY (OUTPATIENT)
Age: 60
End: 2020-06-15
Payer: COMMERCIAL

## 2020-06-15 ENCOUNTER — HOSPITAL ENCOUNTER (OUTPATIENT)
Facility: CLINIC | Age: 60
Discharge: HOME | End: 2020-06-15
Attending: INTERNAL MEDICINE | Admitting: INTERNAL MEDICINE
Payer: COMMERCIAL

## 2020-06-15 ENCOUNTER — HOSPITAL ENCOUNTER (OUTPATIENT)
Dept: CARDIOLOGY | Facility: CLINIC | Age: 60
End: 2020-06-15
Attending: INTERNAL MEDICINE
Payer: COMMERCIAL

## 2020-06-15 ENCOUNTER — TELEPHONE (OUTPATIENT)
Dept: PHARMACY | Facility: CLINIC | Age: 60
End: 2020-06-15

## 2020-06-15 ENCOUNTER — APPOINTMENT (OUTPATIENT)
Dept: MEDSURG UNIT | Facility: CLINIC | Age: 60
End: 2020-06-15
Attending: INTERNAL MEDICINE
Payer: COMMERCIAL

## 2020-06-15 ENCOUNTER — RESULTS ONLY (OUTPATIENT)
Dept: OTHER | Facility: CLINIC | Age: 60
End: 2020-06-15

## 2020-06-15 VITALS
SYSTOLIC BLOOD PRESSURE: 160 MMHG | DIASTOLIC BLOOD PRESSURE: 108 MMHG | WEIGHT: 184.6 LBS | RESPIRATION RATE: 18 BRPM | HEIGHT: 64 IN | OXYGEN SATURATION: 99 % | TEMPERATURE: 97.6 F | BODY MASS INDEX: 31.51 KG/M2

## 2020-06-15 DIAGNOSIS — Z13.220 LIPID SCREENING: ICD-10-CM

## 2020-06-15 DIAGNOSIS — Z94.1 STATUS POST HEART TRANSPLANTATION (H): ICD-10-CM

## 2020-06-15 DIAGNOSIS — Z94.1 HEART REPLACED BY TRANSPLANT (H): ICD-10-CM

## 2020-06-15 DIAGNOSIS — E11.9 DIABETES MELLITUS, TYPE 2 (H): ICD-10-CM

## 2020-06-15 DIAGNOSIS — I15.8 OTHER SECONDARY HYPERTENSION: ICD-10-CM

## 2020-06-15 LAB
ALBUMIN SERPL-MCNC: 3.3 G/DL (ref 3.4–5)
ALP SERPL-CCNC: 43 U/L (ref 40–150)
ALT SERPL W P-5'-P-CCNC: 50 U/L (ref 0–70)
ANION GAP SERPL CALCULATED.3IONS-SCNC: 8 MMOL/L (ref 3–14)
AST SERPL W P-5'-P-CCNC: 24 U/L (ref 0–45)
BILIRUB SERPL-MCNC: 0.5 MG/DL (ref 0.2–1.3)
BUN SERPL-MCNC: 98 MG/DL (ref 7–30)
CALCIUM SERPL-MCNC: 9 MG/DL (ref 8.5–10.1)
CHLORIDE SERPL-SCNC: 110 MMOL/L (ref 94–109)
CHOLEST SERPL-MCNC: 183 MG/DL
CK SERPL-CCNC: 132 U/L (ref 30–300)
CMV DNA SPEC NAA+PROBE-ACNC: NORMAL [IU]/ML
CMV DNA SPEC NAA+PROBE-LOG#: NORMAL {LOG_IU}/ML
CO2 SERPL-SCNC: 21 MMOL/L (ref 20–32)
CREAT SERPL-MCNC: 1.89 MG/DL (ref 0.66–1.25)
ERYTHROCYTE [DISTWIDTH] IN BLOOD BY AUTOMATED COUNT: 18.6 % (ref 10–15)
FUNGUS SPEC CULT: NORMAL
GFR SERPL CREATININE-BSD FRML MDRD: 38 ML/MIN/{1.73_M2}
GLUCOSE SERPL-MCNC: 120 MG/DL (ref 70–99)
HBA1C MFR BLD: 7.8 % (ref 0–5.6)
HCT VFR BLD AUTO: 31.3 % (ref 40–53)
HDLC SERPL-MCNC: 86 MG/DL
HGB BLD-MCNC: 9.4 G/DL (ref 13.3–17.7)
LDLC SERPL CALC-MCNC: 82 MG/DL
MAGNESIUM SERPL-MCNC: 1.7 MG/DL (ref 1.6–2.3)
MCH RBC QN AUTO: 26.3 PG (ref 26.5–33)
MCHC RBC AUTO-ENTMCNC: 30 G/DL (ref 31.5–36.5)
MCV RBC AUTO: 87 FL (ref 78–100)
NONHDLC SERPL-MCNC: 97 MG/DL
PHOSPHATE SERPL-MCNC: 4.4 MG/DL (ref 2.5–4.5)
PLATELET # BLD AUTO: 208 10E9/L (ref 150–450)
POTASSIUM SERPL-SCNC: 4.8 MMOL/L (ref 3.4–5.3)
PROT SERPL-MCNC: 6.4 G/DL (ref 6.8–8.8)
RBC # BLD AUTO: 3.58 10E12/L (ref 4.4–5.9)
SODIUM SERPL-SCNC: 139 MMOL/L (ref 133–144)
SPECIMEN SOURCE: NORMAL
SPECIMEN SOURCE: NORMAL
TACROLIMUS BLD-MCNC: 13.5 UG/L (ref 5–15)
TME LAST DOSE: NORMAL H
TRIGL SERPL-MCNC: 73 MG/DL
WBC # BLD AUTO: 5.7 10E9/L (ref 4–11)

## 2020-06-15 PROCEDURE — 36415 COLL VENOUS BLD VENIPUNCTURE: CPT | Performed by: INTERNAL MEDICINE

## 2020-06-15 PROCEDURE — C1894 INTRO/SHEATH, NON-LASER: HCPCS | Performed by: INTERNAL MEDICINE

## 2020-06-15 PROCEDURE — 84100 ASSAY OF PHOSPHORUS: CPT | Performed by: INTERNAL MEDICINE

## 2020-06-15 PROCEDURE — 93505 ENDOMYOCARDIAL BIOPSY: CPT | Mod: 26 | Performed by: INTERNAL MEDICINE

## 2020-06-15 PROCEDURE — 85027 COMPLETE CBC AUTOMATED: CPT | Performed by: INTERNAL MEDICINE

## 2020-06-15 PROCEDURE — 40000167 ZZH STATISTIC PP CARE STAGE 2

## 2020-06-15 PROCEDURE — 86832 HLA CLASS I HIGH DEFIN QUAL: CPT | Performed by: INTERNAL MEDICINE

## 2020-06-15 PROCEDURE — 80197 ASSAY OF TACROLIMUS: CPT | Performed by: INTERNAL MEDICINE

## 2020-06-15 PROCEDURE — 93306 TTE W/DOPPLER COMPLETE: CPT

## 2020-06-15 PROCEDURE — 93306 TTE W/DOPPLER COMPLETE: CPT | Mod: 26 | Performed by: INTERNAL MEDICINE

## 2020-06-15 PROCEDURE — 25000125 ZZHC RX 250: Performed by: INTERNAL MEDICINE

## 2020-06-15 PROCEDURE — 88350 IMFLUOR EA ADDL 1ANTB STN PX: CPT | Performed by: INTERNAL MEDICINE

## 2020-06-15 PROCEDURE — 80061 LIPID PANEL: CPT | Performed by: INTERNAL MEDICINE

## 2020-06-15 PROCEDURE — 87799 DETECT AGENT NOS DNA QUANT: CPT | Performed by: INTERNAL MEDICINE

## 2020-06-15 PROCEDURE — 86833 HLA CLASS II HIGH DEFIN QUAL: CPT | Performed by: INTERNAL MEDICINE

## 2020-06-15 PROCEDURE — 93505 ENDOMYOCARDIAL BIOPSY: CPT | Performed by: INTERNAL MEDICINE

## 2020-06-15 PROCEDURE — 82550 ASSAY OF CK (CPK): CPT | Performed by: INTERNAL MEDICINE

## 2020-06-15 PROCEDURE — 88346 IMFLUOR 1ST 1ANTB STAIN PX: CPT | Performed by: INTERNAL MEDICINE

## 2020-06-15 PROCEDURE — 83036 HEMOGLOBIN GLYCOSYLATED A1C: CPT | Performed by: INTERNAL MEDICINE

## 2020-06-15 PROCEDURE — 71046 X-RAY EXAM CHEST 2 VIEWS: CPT

## 2020-06-15 PROCEDURE — 88307 TISSUE EXAM BY PATHOLOGIST: CPT | Performed by: INTERNAL MEDICINE

## 2020-06-15 PROCEDURE — 27210794 ZZH OR GENERAL SUPPLY STERILE: Performed by: INTERNAL MEDICINE

## 2020-06-15 PROCEDURE — 80053 COMPREHEN METABOLIC PANEL: CPT | Performed by: INTERNAL MEDICINE

## 2020-06-15 PROCEDURE — 83735 ASSAY OF MAGNESIUM: CPT | Performed by: INTERNAL MEDICINE

## 2020-06-15 RX ORDER — TACROLIMUS 1 MG/1
CAPSULE ORAL
Qty: 150 CAPSULE | Refills: 1 | Status: SHIPPED | OUTPATIENT
Start: 2020-06-15 | End: 2020-08-03

## 2020-06-15 RX ORDER — AMLODIPINE BESYLATE 5 MG/1
7.5 TABLET ORAL AT BEDTIME
Qty: 90 TABLET | Refills: 3
Start: 2020-06-15 | End: 2020-06-23

## 2020-06-15 RX ORDER — PREDNISONE 10 MG/1
TABLET ORAL
Qty: 120 TABLET | Refills: 0 | Status: SHIPPED | OUTPATIENT
Start: 2020-06-15 | End: 2020-06-16

## 2020-06-15 RX ORDER — LIDOCAINE 40 MG/G
CREAM TOPICAL
Status: COMPLETED | OUTPATIENT
Start: 2020-06-15 | End: 2020-06-15

## 2020-06-15 RX ORDER — VALGANCICLOVIR 450 MG/1
450 TABLET, FILM COATED ORAL DAILY
Qty: 30 TABLET | Refills: 0 | Status: SHIPPED | OUTPATIENT
Start: 2020-06-15 | End: 2020-07-15

## 2020-06-15 RX ADMIN — LIDOCAINE: 40 CREAM TOPICAL at 09:58

## 2020-06-15 RX ADMIN — LIDOCAINE HYDROCHLORIDE 5 ML: 10 INJECTION, SOLUTION EPIDURAL; INFILTRATION; INTRACAUDAL; PERINEURAL at 10:11

## 2020-06-15 ASSESSMENT — MIFFLIN-ST. JEOR: SCORE: 1573.34

## 2020-06-15 NOTE — IP AVS SNAPSHOT
[facility], [facility],  Heart Cath Lab  [address] [address]  Phone:  [phone]                                    After Visit Summary   6/15/2020    Mariusz Sharp    MRN: [id number]           After Visit Summary Signature Page    I have received my discharge instructions, and my questions have been answered. I have discussed any challenges I see with this plan with the nurse or doctor.    ..........................................................................................................................................  Patient/Patient Representative Signature      ..........................................................................................................................................  Patient Representative Print Name and Relationship to Patient    ..................................................               ................................................  Date                                   Time    ..........................................................................................................................................  Reviewed by Signature/Title    ...................................................              ..............................................  Date                                               Time          22EPIC Rev 08/18

## 2020-06-15 NOTE — TELEPHONE ENCOUNTER
Patient Call: Medication Refill  Route to LPN  Instruct the patient to first contact their pharmacy. If they have called their pharmacy and require further assistance, route to LPN.    Pharmacy Name: [facility]  Pharmacy Location: [address] [place] [address]  Name of Medication: Valcyte 450 mg and Tacrolimus 1 mg  When will the patient be out of this medication?: Greater than 3 days (Route standard priority)

## 2020-06-15 NOTE — PROGRESS NOTES
[facility]   Interventional Cardiology        Consenting/Education for Right Heart Catheterization/Endomyocardial Biopsy    Patient understands during the portion for right heart catheterization a fine tube (catheter) is put into the vein of the groin/neck.  It is carefully passed along until it reaches the heart and then goes up into the blood vessels of the lungs. This is done to measure a variety of pressures in your heart and can tell us how well the heart is filling and emptying, as well as monitor fluid status. While the catheter is in your neck/groin vein, a  Biopsy forceps  or pincers at the end of the catheter are used to take the tissue samples. This is may be repeated to get enough samples. The biopsy pieces are very small (one to two millimeters).     Patient also understands risks and complications of the procedure which include, but are not limited to bruising/swelling around the incision site, infection, bleeding, allergic reaction to local anesthetic, air embolism, arterial puncture, stroke, heart attack.     Patient verbalized understanding of risks and benefits of the right heart catheterization, and endomyocardial biopsy and has elected to proceed with the procedure.       Hugo Gutierrez PA-C  [facility] Cardiology Team

## 2020-06-15 NOTE — PROGRESS NOTES
Has had consult with transplant nurse & surgery team.  Patient tolerated recovery stage well. VSS, RIJV site clean/dry/intact, no hematoma, and denies pain. Patient tolerated PO fluids, did not want food at this time. Teaching was done and discharge instructions were given.  Patient discharged from the hospital via  ambulatory to return to [facility].

## 2020-06-15 NOTE — DISCHARGE INSTRUCTIONS
[facility]                        Interventional Cardiology  Discharge Instructions   Post Right Heart Cath      Medication changes:   - Please increase amlodipine to 7.5 mg daily  - The Transplant coordinator will follow-up your tacrolimus level and biopsy and let you know if your tacrolimus or prednisone should be changed  - I will have a transplant coordinator call you in about a week to review your blood pressure log    AFTER YOU GO HOME:    DO drink plenty of fluids    DO resume your regular diet and medications unless otherwise instructed by your Primary Physician    Do Not scrub the procedure site vigorously    No lotion or powder to the puncture site for 3 days    CALL YOUR PRIMARY PHYSICIAN IF: You may resume all normal activity.  Monitor neck site for bleeding, swelling, or voice changes. If you notice bleeding or swelling immediately apply pressure to the site and call number below to speak with Cardiology Fellow.  If you experience any changes in your breathing you should call your doctor immediately or come to the closest Emergency Department.  Do not drive yourself.    ADDITIONAL INSTRUCTIONS: Medications: You are to resume all home medications including anticoagulation therapy unless otherwise advised by your primary cardiologist or nurse coordinator.    Follow Up: Per your primary cardiology team    If you have any questions or concerns regarding your procedure site please call [phone] at anytime and ask for Cardiology Fellow on call.  They are available 24 hours a day.  You may also contact the Cardiology Clinic after hours number at [phone].                                                       Telephone Numbers [phone] Monday-Friday 8:00 am to 4:30 pm    [phone] After 4:30 pm Monday-Friday, Weekends & Holidays  Ask for Interventional Cardiologist on call. Someone is on call 24 hours/day   [facility] toll free number [phone] Monday-Friday 8:00  am to 4:30 pm   [facility] Emergency Dept [phone]

## 2020-06-15 NOTE — TELEPHONE ENCOUNTER
Tacrolimus level 13.5.  Goal 10-12.  Tacro dose decreased to 3.5/3.  Pt to recheck in one week.  Pt states understanding instructions.

## 2020-06-15 NOTE — PROGRESS NOTES
Returned from CCL to @A post procedure.  Denies pain.  RIJV puncture site C/D/I with intact bandaid over site.  BP remains high at160/108, similar to his trend.  Taking po water, does not want fluids at this time.  Awaiting transplant team visit before discharge.

## 2020-06-15 NOTE — TELEPHONE ENCOUNTER
Called patient for diabetes follow up, but looks like Endocrine already saw him last week and has another appointment with him this coming Friday. Cancelled appt today after talking with patient because it was redundant.     Xavi Mckeon, PharmD  [facility] Pharmacist    Phone: [phone]

## 2020-06-16 ENCOUNTER — HOSPITAL ENCOUNTER (OUTPATIENT)
Dept: CARDIAC REHAB | Facility: CLINIC | Age: 60
End: 2020-06-16
Attending: NURSE PRACTITIONER
Payer: COMMERCIAL

## 2020-06-16 ENCOUNTER — TELEPHONE (OUTPATIENT)
Dept: TRANSPLANT | Facility: CLINIC | Age: 60
End: 2020-06-16

## 2020-06-16 DIAGNOSIS — Z94.1 STATUS POST HEART TRANSPLANTATION (H): ICD-10-CM

## 2020-06-16 LAB
COPATH REPORT: NORMAL
DONOR IDENTIFICATION: NORMAL
DSA COMMENTS: NORMAL
DSA PRESENT: NO
DSA TEST METHOD: NORMAL
EBV DNA # SPEC NAA+PROBE: NORMAL {COPIES}/ML
EBV DNA SPEC NAA+PROBE-LOG#: NORMAL {LOG_COPIES}/ML
ORGAN: NORMAL
SA1 CELL: NORMAL
SA1 COMMENTS: NORMAL
SA1 HI RISK ABY: NORMAL
SA1 MOD RISK ABY: NORMAL
SA1 TEST METHOD: NORMAL
SA2 CELL: NORMAL
SA2 COMMENTS: NORMAL
SA2 HI RISK ABY UA: NORMAL
SA2 MOD RISK ABY: NORMAL
SA2 TEST METHOD: NORMAL
UNACCEPTABLE ANTIGEN: NORMAL
UNOS CPRA: 0

## 2020-06-16 PROCEDURE — 40000116 ZZH STATISTIC OP CR VISIT

## 2020-06-16 PROCEDURE — 93798 PHYS/QHP OP CAR RHAB W/ECG: CPT

## 2020-06-16 RX ORDER — PREDNISONE 10 MG/1
TABLET ORAL
Qty: 120 TABLET | Refills: 0 | Status: ON HOLD | OUTPATIENT
Start: 2020-06-16 | End: 2020-06-30

## 2020-06-16 RX ORDER — PREDNISONE 10 MG/1
TABLET ORAL
Qty: 120 TABLET | Refills: 0 | Status: SHIPPED | OUTPATIENT
Start: 2020-06-16 | End: 2020-06-16

## 2020-06-16 NOTE — PROGRESS NOTES
ADULT [facility]ART TRANSPLANT [facility]    HPI:   Mr. Sharp is a 59 year old male who presents to 2A today after RHC and biopsy for routine heart transplant follow-up. 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 First biopsy 5/24 was negative for significant rejection. RHC 5/24 RA 7, mPA 24, PCW 15, and CI 2.6.  TTE 5/23 showed stable graft function, LVEF 65-70% and normal RV size/function. Post-operative course was c/b NSVT (with no hemodynamic compromise), leukocytosis with C Acnes growing from his former LVAD site (thought to be a contaminant, but treated with IV vanco --> po doxy through 6/1/20, 14d course total), and in the setting of donor blood growing S anginosus and Veillonella (also thought to be a contaminant, but treated with Vanco/zosyn --> Vanco/Flagyl for a total of 7 days). He also had hyperkalemia, which improved following kayexalate and stopping bactrim. He discharged 5/29 to [facility].    Since discharge he has been feeling well. He walkd 2.5 miles per day and has gone as far as 4. His blood pressures have been 130s-140s/80s/90s. A little more elevated in lab today which he attributes to nerves for the procedure. No low blood pressures.  No LE edema, orthopnea, PND, RADER, SOB. Denies headaches, fevers/chills, night sweats. Denies nausea, vomiting and abdominal pain. He does have about 5 BMS per day but is adamant that his is not diarrhea, usually firm, very rarely soft. No mouth sores or pain.  His sternum has no snapping/clicking/redness or drainage. The site where his ICD was removed has no redness, pain or drainage. He does feel like behind the old ICD site there is some occasional clicking when he puts on his shirt. Does not feel like it is in his shoulder. He says it has been going on since surgery unchanged. He  knows to let us know if anything changes and we reviewed the signs of infection he should be looking for. His chest tube sites are mostly scabbed over, two of them are continuing to drain, but that is consistently improving and nearly gone.    PAST MEDICAL HISTORY:  Past Medical History:   Diagnosis Date     Acute kidney injury (H)      CKD (chronic kidney disease)      Coronary artery disease      Diabetes mellitus (H)      HTN (hypertension)      Hyperlipidemia      Ischemic cardiomyopathy      LV (left ventricular) mural thrombus      Paroxysmal atrial flutter (H)      RVF (right ventricular failure) (H)      Systolic heart failure (H)      Ventricular tachycardia (H)        FAMILY HISTORY:  No family history on file.    SOCIAL HISTORY:  Social History     Socioeconomic History     Marital status: Single     Spouse name: Not on file     Number of children: Not on file     Years of education: Not on file     Highest education level: Not on file   Occupational History     Not on file   Social Needs     Financial resource strain: Not on file     Food insecurity     Worry: Not on file     Inability: Not on file     Transportation needs     Medical: Not on file     Non-medical: Not on file   Tobacco Use     Smoking status: Never Smoker     Smokeless tobacco: Never Used   Substance and Sexual Activity     Alcohol use: No     Frequency: Never     Drug use: No     Sexual activity: Not on file   Lifestyle     Physical activity     Days per week: Not on file     Minutes per session: Not on file     Stress: Not on file   Relationships     Social connections     Talks on phone: Not on file     Gets together: Not on file     Attends Congregation service: Not on file     Active member of club or organization: Not on file     Attends meetings of clubs or organizations: Not on file     Relationship status: Not on file     Intimate partner violence     Fear of current or ex partner: Not on file     Emotionally abused: Not on file      Physically abused: Not on file     Forced sexual activity: Not on file   Other Topics Concern     Not on file   Social History Narrative     Not on file       CURRENT MEDICATIONS:  No current facility-administered medications on file prior to encounter.   ACCU-CHEK [place] PLUS test strip,   acetaminophen (TYLENOL) 325 MG tablet, Take 2 tablets (650 mg) by mouth every 4 hours as needed for other (multimodal surgical pain management along with NSAIDS and opioid medication as indicated based on pain control and physical function.)  aspirin (ASA) 81 MG chewable tablet, Take 1 tablet (81 mg) by mouth daily  calcium carbonate 600 mg-vitamin D 400 units (CALTRATE) 600-400 MG-UNIT per tablet, Take 1 tablet by mouth 2 times daily (with meals)  insulin aspart (NOVOLOG PEN) 100 UNIT/ML pen, Correction Scale - HIGH INSULIN RESISTANCE DOSING     Do Not give Correction Insulin if Pre-Meal BG less than 140.   For Pre-Meal  - 164 give 1 unit.   For Pre-Meal  - 189 give 2 units.   For Pre-Meal  - 214 give 3 units.   For Pre-Meal  - 239 give 4 units.   For Pre-Meal  - 264 give 5 units.   For Pre-Meal  - 289 give 6 units.   For Pre-Meal  - 314 give 7 units.   For Pre-Meal  - 339 give 8 units.   For Pre-Meal  - 364 give 9 units.   For Pre-Meal BG greater than or equal to 365 give 10 units  To be given with prandial insulin, and based on pre-meal blood glucose.   Notify provider if glucose greater than or equal to 350 mg/dL after administration of correction dose. If given at mealtime, administer within 30 minutes of start of meal  insulin aspart (NOVOLOG PEN) 100 UNIT/ML pen, Inject 1-7 Units Subcutaneous At Bedtime HIGH INSULIN RESISTANCE DOSING    Do Not give Bedtime Correction Insulin if BG less than 200.   For  - 224 give 1 units.   For  - 249 give 2 units.   For  - 274 give 3 units.   For  - 299 give 4 units.   For  - 324 give 5 units.   For   - 349 give 6 units.   For BG greater than or equal to 350 give 7 units.   Notify provider if glucose greater than or equal to 350 mg/dL after administration of correction dose. If given at mealtime, administer within 30 minutes of start of meal  pantoprazole (PROTONIX) 40 MG EC tablet, Take 1 tablet (40 mg) by mouth every morning (before breakfast)  rosuvastatin (CRESTOR) 10 MG tablet, Take 1 tablet (10 mg) by mouth daily        ROS:  See HPI    EXAM:  There were no vitals taken for this visit.  GENERAL: Appears comfortable, in no acute distress.   HEENT: Eye symmetrical, no discharge or icterus bilaterally. Mucous membranes moist and without lesions. No thrush.   CV: Tachycardic per baseline and regular, +S1S2, no murmur, rub, or gallop. JVP not visible.   RESPIRATORY: Respirations regular, even, and unlabored. Lungs CTA throughout.   GI: Soft, obese and non distended with normoactive bowel sounds present in all quadrants. No tenderness, rebound, guarding. No hepatomegaly.   EXTREMITIES: No peripheral edema. 2+ bilateral pedal pulses.   NEUROLOGIC: Alert and interacting appropriatly. No focal deficits.   MUSCULOSKELETAL: No joint swelling or tenderness.   SKIN: No jaundice. No rashes or lesions.  Sternum well-healed, one small opening looked at by surgery, feel this is due to a suture. No concerns.  Old chest tube site dressing clean dry and intact, on take-down, sites are while healing. Evidence of very scant drainage from two, no signs of infection.    Labs - reviewed with patient in clinic today:  CBC RESULTS:  Lab Results   Component Value Date    WBC 5.7 06/15/2020    RBC 3.58 (L) 06/15/2020    HGB 9.4 (L) 06/15/2020    HCT 31.3 (L) 06/15/2020    MCV 87 06/15/2020    MCH 26.3 (L) 06/15/2020    MCHC 30.0 (L) 06/15/2020    RDW 18.6 (H) 06/15/2020     06/15/2020       CMP RESULTS:  Lab Results   Component Value Date     06/15/2020    POTASSIUM 4.8 06/15/2020    CHLORIDE 110 (H) 06/15/2020    CO2 21  06/15/2020    ANIONGAP 8 06/15/2020     (H) 06/15/2020    BUN 98 (H) 06/15/2020    CR 1.89 (H) 06/15/2020    GFRESTIMATED 38 (L) 06/15/2020    GFRESTBLACK 44 (L) 06/15/2020    ARLENE 9.0 06/15/2020    BILITOTAL 0.5 06/15/2020    ALBUMIN 3.3 (L) 06/15/2020    ALKPHOS 43 06/15/2020    ALT 50 06/15/2020    AST 24 06/15/2020        INR RESULTS:  Lab Results   Component Value Date    INR 1.16 (H) 06/04/2020       LIPID RESULTS:  Lab Results   Component Value Date    CHOL 183 06/15/2020    HDL 86 06/15/2020    LDL 82 06/15/2020    TRIG 73 06/15/2020       IMMUNOSUPPRESSANT LEVELS:  Lab Results   Component Value Date    TACROL 13.5 06/15/2020    DOSTAC Not Provided 06/15/2020       No components found for: CK  Lab Results   Component Value Date    MAG 1.7 06/15/2020     Lab Results   Component Value Date    A1C 7.8 (H) 06/15/2020     Lab Results   Component Value Date    PHOS 4.4 06/15/2020     Lab Results   Component Value Date    NTBNP 404 (H) 06/26/2019     Lab Results   Component Value Date    SAITESTMET [facility] 05/24/2020    SAICELL Class I 05/24/2020    CY2VSTZFQ Cw:15 05/24/2020    MB2WPEQZSP Cw:18 05/24/2020    SAIREPCOM  05/24/2020      Test performed by modified procedure. Serum heat inactivated and tested   by a modified ([place]) protocol including fetal calf serum addition.   High-risk, mfi >3,000. Mod-risk, mfi 500-3,000.       Lab Results   Component Value Date    SAIITESTME [facility] 05/24/2020    SAIICELL Class II 05/24/2020    [id number] None 05/24/2020    [id number] None 05/24/2020    SAIIREPCOM  05/24/2020      Test performed by modified procedure. Serum heat inactivated and tested   by a modified ([place]) protocol including fetal calf serum addition.   High-risk, mfi >3,000. Mod-risk, mfi 500-3,000.       Lab Results   Component Value Date    CSPEC EDTA PLASMA 06/15/2020       Diagnostic Studies:  6/15/2020 ECHO  Interpretation Summary  Global and regional left ventricular function is normal with an EF of  60-65%.  Right ventricular function, chamber size, wall motion, and thickness are  normal.  The inferior vena cava is normal.  No pericardial effusion is present.    6/15/2020 RHC   Time  Systolic  Diastolic  Mean  A Wave  V Wave  EDP  Max dp/dt  HR    RA Pressures  10:17 AM    5 mmHg     7 mmHg     6 mmHg       103 bpm       RV Pressures  10:18 AM  25 mmHg         7 mmHg      102 bpm       PA Pressures  10:20 AM  25 mmHg     18 mmHg     20 mmHg         102 bpm       PCW Pressures  10:18 AM    18 mmHg     20 mmHg     21 mmHg       102 bpm          Time  TDCO  TDCI  William C.O.  William C.I.  William HR    Cardiac Output Results  10:06 AM  5.43 L/min     2.88 L/min/m2     5.8 L/min     3.08 L/min/m2         10:20 AM  5.43 L/min                 Assessment/Plan:  Mr. Sharp is a 59 year old male who is s/p orthotopic heart transplant on 5/18/20 who presents to for routine follow-up. 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  Postop course complicated by NSVT, leukocytosis, and C acnes growing from former LVAD site treated with 14 days of antibiotics. Donor blood growing S anginosus and Veillonella (also thought to be a contaminant, but treated with Vanco/zosyn --> Vanco/Flagyl for total 7 days).  He also had hyperkalemia, which improved following kayexalate and stopping bactrim.  His graft function remains normal by echocardiogram.  Right heart cath today shows normal filling pressures normal cardiac output.  He does not have DSA's.      # Status post OHT on 5/18/20, history of ICM  # Chronic immunosuppression  * Rejection history: none.   * Recent immunosuppression changes: none  * Last biopsy: 6/8 1R no AMR  * Intolerance to medications: none        Graft function:  - BP:  elevated consistently 130s/90s, increase amlodipine to 7.5 mg daily  - HRs:  > 80  - Fluid  status: euvolemic not on diuretic     Immunosuppression:  - Received Simulect 5/18 and 5/22  - MMF 1500mg bid  - tacrolimus goal level 10-12. Level today in process, last level 9.6, currently 3.5 BID  - prednisone taper, currently 15BID     PPx:  - CAV: aspirin 81mg daily, crestor 10mg daily  - PCP: Bactrim stopped 5/27 due to hyperkalemia, pentamidine given 6/1, due 6/29 versus start Bactrim  - CMV: valcyte 450mg daily (renally dosed), x6 months (through 11/2020)  - Thrush: Nystatin QID  - Osteoporosis:  alcium/vitamin D supplements  - GI: Pantoprazole 40mg daily     Serostatus:  - CMV D+/R-  - EBV D-/R+  - Toxo D-/R-     #Hyperkalemia, resolved  K has normalized since receiving kayexalate and stopping bactrim. Continue to check per protocol.     #NSVT, resolved  Postoperative, with no hemodynamic compromise.     #Leukocytosis, resolved  #C Acnes growing from his former LVAD site  #Donor positive S Anginosus and Veillonella  thought to be a contaminant, but treated with IV vanco --> po doxy through 6/1/20 (14d course total).  Donor blood grew S anginosus and Veillonella (also thought to be a contaminant), but treated with Vanco/zosyn --> Vanco/Flagyl for a total of 7 days. WBC 5.7 today.      #Elevated PSA  Prostate MRI showed signs of BPH. He has been referred to urology.     # Renal Stone. Stone that will need surgical removal, waiting on prednisone taper. No signs on labs or exam of obstruction today.     #DM Type II   BS controlled on current regimen.       20 minutes spent face-to-face with patient, >50% in counseling and/or coordination of care as described above      Betsey Lindsay PA-C  6/15/2020          [facility]

## 2020-06-18 ENCOUNTER — HOSPITAL ENCOUNTER (OUTPATIENT)
Dept: CARDIAC REHAB | Facility: CLINIC | Age: 60
End: 2020-06-18
Attending: NURSE PRACTITIONER
Payer: COMMERCIAL

## 2020-06-18 PROCEDURE — 93798 PHYS/QHP OP CAR RHAB W/ECG: CPT

## 2020-06-18 PROCEDURE — 40000116 ZZH STATISTIC OP CR VISIT

## 2020-06-18 NOTE — PROGRESS NOTES
06/10/2020  228, 243, 316, 233, 143    06/11/2020  174, 113, 134    06/12/2020  122, 405, 269    06/13/2020  123, 163, 120, 83    06/14/2020  138, 180, 91, 156    06/15/2020  139, 130    06/16/2020  246, 185, 128    06/17/2020  214, 168, 252, 109, 105    06/18/2020  225

## 2020-06-18 NOTE — PROGRESS NOTES
"Mariusz Sharp is a 59 year old male who is being evaluated via a billable telephone visit.      The patient has been notified of following:     \"This telephone visit will be conducted via a call between you and your physician/provider. We have found that certain health care needs can be provided without the need for a physical exam.  This service lets us provide the care you need with a short phone conversation.  If a prescription is necessary we can send it directly to your pharmacy.  If lab work is needed we can place an order for that and you can then stop by our lab to have the test done at a later time.    Telephone visits are billed at different rates depending on your insurance coverage. During this emergency period, for some insurers they may be billed the same as an in-person visit.  Please reach out to your insurance provider with any questions.    If during the course of the call the physician/provider feels a telephone visit is not appropriate, you will not be charged for this service.\"    Patient has given verbal consent for Telephone visit?  Yes    What phone number would you like to be contacted at? [phone]    How would you like to obtain your AVS? Lucius Beaulieu MA    Patients Glucose Data was obtained via telephone. Located in my note.     Due to the COVID 19 pandemic this visit was converted to a telephone visit in order to help prevent spread of infection in this patient and the general population.    Time of start: 10:30 am  Time of end: 10:44 am  Total duration of telephone visit: 14 minutes.    CHANELL Sharp ( Pepe ) is a 59 year old male with type 2 diabetes mellitus.  Telephone visit today for diabetes care.  Pt was admitted 5/17/2020-5/29/2020 and underwent a heart transplant on 5/18/2020.  Red reports he continues to do well.   Pt's hx is significant for type 2 diabetes mellitus dx 3 years ago, HTN, hyperlipidemia, hx of ischemic heart disease s/p STEMI with ALYSSA " 2019, LVAD placement, Stage 3 CKD, and obesity.  Apparently heart donor blood cx and respiratory cultures were + for H flu, staph and strep.  Red was also admitted 5/12-5/15/2020 for ANDREW due to nephrolithiasis complicated by hydronephrosis/ANDREW.  Post heart transplant he received high dose steroids and he is currently on a prednisone taper 15 mg each am and 10 mg each pm.  For his diabetes, pt is currently taking Lantus 45 units subcutaneous each pm and Novolog 1 unit/4 gms CHO with meals with correction scale  ( 1 unit/25 for BG > 140 ) and bedtime correction scale ( 1 unit/25 for BG > 200).  Pt's A1C was 7.8 % on 6/15/2020 and his A1C was 8.0 % on 5/12/2020 and his A1C was 9.5 % on 7/5/2019. Pt is anemic.  Pt send us blood sugar readings for review.  His blood sugar readings are stable.  He had a few high FBS values- he states he only took 1/2 his Lantus dose in the evening since his bs was < 150. He is very fearful of hypoglycemia.  He has had severe hypoglycemia in the past and had a low blood while in the hospital and was asymptomatic.  On ROS today, doing well. He states chest incision has healed.  No fevers or chills  Pt denies headaches, blurred vision, n/v, SOB at rest, abd pain, diarrhea,blood in the stool, melena, dysuria, hematuria or numbness in his feet or hands.  Red denies foot ulcers at this time.    Diabetes Care  Retinopathy:none; pt seen by O[facility] in Dec 2019.  Nephropathy:yes- hx of CKD/ANDREW.  Neuropathy: none.  Foot Exam:no exam today.  Taking aspirin: yes.  Lipids: LDL 82 on 6/15/2020. Pt is taking Crestor.  CAD: yes; s/p heart transplant on 5/18/2020.  Depression: no.  Insulin: Basal and meal time insulin with correction scale ( 1 unit/25 for BG > 140 with meals and > 200 at bedtime).  Testing: glucose meter.    ROS  See under HPI.    Allergies  No Known Allergies    Medications  Current Outpatient Medications   Medication Sig Dispense Refill     ACCU-CHEK [place] PLUS test strip         acetaminophen (TYLENOL) 325 MG tablet Take 2 tablets (650 mg) by mouth every 4 hours as needed for other (multimodal surgical pain management along with NSAIDS and opioid medication as indicated based on pain control and physical function.)       amLODIPine (NORVASC) 5 MG tablet Take 1.5 tablets (7.5 mg) by mouth At Bedtime 90 tablet 3     aspirin (ASA) 81 MG chewable tablet Take 1 tablet (81 mg) by mouth daily 90 tablet 3     BD SILVANA U/F 32G X 4 MM insulin pen needle Use 3-4 daily. 250 each 4     calcium carbonate 600 mg-vitamin D 400 units (CALTRATE) 600-400 MG-UNIT per tablet Take 1 tablet by mouth 2 times daily (with meals)       clotrimazole 10 MG allison Take 1 Allison (10 mg) by mouth 4 times daily 360 Allison 1     insulin aspart (NOVOLOG PEN) 100 UNIT/ML pen 1 unit / 4 gms CHO with meals,plus correction insulin. Pt uses approx 40 units in 24 hrs. 3 mL 3     insulin aspart (NOVOLOG PEN) 100 UNIT/ML pen Correction Scale - HIGH INSULIN RESISTANCE DOSING     Do Not give Correction Insulin if Pre-Meal BG less than 140.   For Pre-Meal  - 164 give 1 unit.   For Pre-Meal  - 189 give 2 units.   For Pre-Meal  - 214 give 3 units.   For Pre-Meal  - 239 give 4 units.   For Pre-Meal  - 264 give 5 units.   For Pre-Meal  - 289 give 6 units.   For Pre-Meal  - 314 give 7 units.   For Pre-Meal  - 339 give 8 units.   For Pre-Meal  - 364 give 9 units.   For Pre-Meal BG greater than or equal to 365 give 10 units  To be given with prandial insulin, and based on pre-meal blood glucose.   Notify provider if glucose greater than or equal to 350 mg/dL after administration of correction dose. If given at mealtime, administer within 30 minutes of start of meal 3 mL 0     insulin aspart (NOVOLOG PEN) 100 UNIT/ML pen Inject 1-7 Units Subcutaneous At Bedtime HIGH INSULIN RESISTANCE DOSING    Do Not give Bedtime Correction Insulin if BG less than 200.   For  - 224 give 1 units.   For   - 249 give 2 units.   For  - 274 give 3 units.   For  - 299 give 4 units.   For  - 324 give 5 units.   For  - 349 give 6 units.   For BG greater than or equal to 350 give 7 units.   Notify provider if glucose greater than or equal to 350 mg/dL after administration of correction dose. If given at mealtime, administer within 30 minutes of start of meal 3 mL 0     insulin glargine (LANTUS PEN) 100 UNIT/ML pen Inject 44 units subcutaneous daily. 3 mL 0     mycophenolate (GENERIC EQUIVALENT) 500 MG tablet Take 3 tablets (1,500 mg) by mouth 2 times daily 180 tablet 0     pantoprazole (PROTONIX) 40 MG EC tablet Take 1 tablet (40 mg) by mouth every morning (before breakfast) 30 tablet 1     predniSONE (DELTASONE) 10 MG tablet Take 1.5 tablets (15 mg) by mouth every morning AND 1 tablet (10 mg) every evening. 120 tablet 0     rosuvastatin (CRESTOR) 10 MG tablet Take 1 tablet (10 mg) by mouth daily 30 tablet 0     tacrolimus (GENERIC EQUIVALENT) 0.5 MG capsule Take one 0.5mg capsule WITH three 1mg capsules to equal 3.5mg every morning and evening. 120 capsule 0     tacrolimus (GENERIC EQUIVALENT) 1 MG capsule Take 3 capsules with one 0.5mg capule (3.5 mg) by mouth every morning AND 3 capsules  (3mg) every evening. 150 capsule 1     valGANciclovir (VALCYTE) 450 MG tablet Take 1 tablet (450 mg) by mouth daily 30 tablet 0       Family History  Mother and father with hx of type 2 DM.  Social History   reports that he has never smoked. He has never used smokeless tobacco. He reports that he does not drink alcohol or use drugs.     Past Medical History  Past Medical History:   Diagnosis Date     Acute kidney injury (H)      CKD (chronic kidney disease)      Coronary artery disease      Diabetes mellitus (H)      HTN (hypertension)      Hyperlipidemia      Ischemic cardiomyopathy      LV (left ventricular) mural thrombus      Paroxysmal atrial flutter (H)      RVF (right ventricular failure) (H)       Systolic heart failure (H)      Ventricular tachycardia (H)        Past Surgical History:   Procedure Laterality Date     COLONOSCOPY N/A 12/7/2018    Procedure: COLONOSCOPY;  Surgeon: Krish Mercado MD;  Location: [facility]     CV HEART BIOPSY N/A 5/24/2020    Procedure: Heart Biopsy;  Surgeon: Kit Bacon MD;  [facility]     CV HEART BIOPSY N/A 6/1/2020    Procedure: CV HEART BIOPSY;  Surgeon: Kit Bacon MD;  Location: [facility]     CV HEART BIOPSY N/A 6/8/2020    Procedure: CV HEART BIOPSY;  Surgeon: Kit Bacon MD;  Location:  [facility]     CV HEART BIOPSY N/A 6/15/2020    Procedure: CV HEART BIOPSY;  Surgeon: Kit Bacon MD;  Location:  [facility]     CV RIGHT HEART CATH N/A 2/19/2019    Procedure: RHC;  Surgeon: Brian Long MD;  Location:  [facility]     CV RIGHT HEART CATH N/A 7/5/2019    Procedure: CV RIGHT HEART CATH;  Surgeon: Khris Mcclelland MD;  Location:  [facility] C[facility]     CV RIGHT HEART CATH N/A 5/24/2020    Procedure: Right Heart Cath;  Surgeon: Kit Bacon MD;  Location:  [facility]     CV RIGHT HEART CATH N/A 6/1/2020    Procedure: CV RIGHT HEART CATH;  Surgeon: Kit Bacon MD;  Location:  [facility]     CV RIGHT HEART CATH N/A 6/8/2020    Procedure: CV RIGHT HEART CATH;  Surgeon: Kit Bacon MD;  Location:  [facility]     CV RIGHT HEART CATH N/A 6/15/2020    Procedure: CV RIGHT HEART CATH;  Surgeon: Kit Bacon MD;  Location:  [facility]ART CARD[facility] CATH LAB     HC PRQ TRLUML CORONARY ANGIOPLASTY ADDL BRANCH      PCI and ALYSSA to ostial LAD on 6/27/18     IMPLANT AUTOMATIC IMPLANTABLE CARDIOVERTER DEFIBRILLATOR       INSERT VENTRICULAR ASSIST DEVICE LEFT (HEARTMATE II) N/A 12/31/2018    Procedure: Median  Sternotomy, On Cardiopulmonary Bypass Pump, Insertion of Left Ventricular Assist Device (Heartmate III);  Surgeon: Kamaljit Baker MD;  Location: [facility] OR     PICC DOUBLE LUMEN PLACEMENT Right 05/22/2020    5FR DL PICC inserted at right basilic vein, length 38cm.     TRANSPLANT HEART RECIPIENT AFTER HEART MATE N/A 5/18/2020    Procedure: REDO MEDIAN STERNOTOMY, LYSIS OF ADHESIONS, ON CARDIOPULMONARY BYPASS PUMP, HEART TRANSPLANT RECIPIENT, REMOVAL OF LEFT VENTRICULAR ASSIST DEVICE, REMOVAL OF AUTOMATED IMPLANTABLE CARDIOVERTER DEFIBRILLATOR;  Surgeon: Elder Willis MD;  Location: [facility] OR       Physical Exam    No exam today.    RESULTS  Creatinine   Date Value Ref Range Status   06/15/2020 1.89 (H) 0.66 - 1.25 mg/dL Final     GFR Estimate   Date Value Ref Range Status   06/15/2020 38 (L) >60 mL/min/[1.73_m2] Final     Comment:     Non  GFR Calc  Starting 12/18/2018, serum creatinine based estimated GFR (eGFR) will be   calculated using the Chronic Kidney Disease Epidemiology Collaboration   (CKD-EPI) equation.       Hemoglobin A1C   Date Value Ref Range Status   06/15/2020 7.8 (H) 0 - 5.6 % Final     Comment:     Normal <5.7% Prediabetes 5.7-6.4%  Diabetes 6.5% or higher - adopted from ADA   consensus guidelines.       Potassium   Date Value Ref Range Status   06/15/2020 4.8 3.4 - 5.3 mmol/L Final     ALT   Date Value Ref Range Status   06/15/2020 50 0 - 70 U/L Final     AST   Date Value Ref Range Status   06/15/2020 24 0 - 45 U/L Final     TSH   Date Value Ref Range Status   06/26/2019 3.94 0.40 - 4.00 mU/L Final     T4 Free   Date Value Ref Range Status   07/23/2018 1.05 0.76 - 1.46 ng/dL Final       Cholesterol   Date Value Ref Range Status   06/15/2020 183 <200 mg/dL Final   08/06/2018 126 <200 mg/dL Final     HDL Cholesterol   Date Value Ref Range Status   06/15/2020 86 >39 mg/dL Final   08/06/2018 52 >39 mg/dL Final     LDL Cholesterol Calculated   Date Value Ref Range Status   06/15/2020 82  <100 mg/dL Final     Comment:     Desirable:       <100 mg/dl   08/06/2018 24 <100 mg/dL Final     Comment:     Desirable:       <100 mg/dl     Triglycerides   Date Value Ref Range Status   06/15/2020 73 <150 mg/dL Final   08/06/2018 246 (H) <150 mg/dL Final     Comment:     Borderline high:  150-199 mg/dl  High:             200-499 mg/dl  Very high:       >499 mg/dl       A1C   7.8 % on  6/15/2020    ASSESSMENT/PLAN:      1.  TYPE 2 DIABETES MELLITUS: Type 2 diabetes mellitus complicated by hx of CAD and ischemic cardiomyopathy s/p heart transplant on 5/18/2020. Pt is doing well at this time and remains on a prednisone taper 15 mg each am and 10 mg each pm.  Red's blood sugar values are stable at this time.  Pt instructed to take Lantus 45 units daily and continue Novolog I/C ratio to 1:4 with current correction scale ( 1 unit/25 for BG > 140 ).  Days of cardiac rehab, he was instructed to take 1/2 Novolog dose the meal prior to his rehab to help prevent hypoglycemia.  I will have our CDE call patient to discuss use of a DexcomG6 sensor.  I plan to call Red on 7/21/2020 to review his blood sugar readings. If he has any questions regarding his diabetes care, he is to call our clinic. I provided him with our contact number today.  Pt was seen by Oph in Dec 2019 without retinopathy.  He denies any sx of neuropathy or foot ulcers at this time.  He is taking ASA daily.    2.  CKD/ANDREW: Most recent creat was 1.89 with GFR 38 mL/min on 6/15/2020.    3.  HX OF ISCHEMIC CARDIOMYOPATHY: S/P heart transplant on 5/18/2020 and followed closely by transplant staff here.     4.  FOLLOW UP : with me on7/21/2020 at 1 pm.  He is to call if he has any questions regarding his diabetes care.

## 2020-06-19 ENCOUNTER — VIRTUAL VISIT (OUTPATIENT)
Dept: ENDOCRINOLOGY | Facility: CLINIC | Age: 60
End: 2020-06-19
Payer: COMMERCIAL

## 2020-06-19 DIAGNOSIS — E11.65 TYPE 2 DIABETES MELLITUS WITH HYPERGLYCEMIA, WITH LONG-TERM CURRENT USE OF INSULIN (H): Primary | ICD-10-CM

## 2020-06-19 DIAGNOSIS — Z79.4 TYPE 2 DIABETES MELLITUS WITH HYPERGLYCEMIA, WITH LONG-TERM CURRENT USE OF INSULIN (H): Primary | ICD-10-CM

## 2020-06-19 NOTE — LETTER
"6/19/2020       RE: Mariusz Sharp  [address] [facility] [address]     Dear Colleague,    Thank you for referring your patient, Mariusz Sharp, to the [facility] ENDOCRINOLOGY at [facility]. Please see a copy of my visit note below.    Mariusz Sharp is a 59 year old male who is being evaluated via a billable telephone visit.      The patient has been notified of following:     \"This telephone visit will be conducted via a call between you and your physician/provider. We have found that certain health care needs can be provided without the need for a physical exam.  This service lets us provide the care you need with a short phone conversation.  If a prescription is necessary we can send it directly to your pharmacy.  If lab work is needed we can place an order for that and you can then stop by our lab to have the test done at a later time.    Telephone visits are billed at different rates depending on your insurance coverage. During this emergency period, for some insurers they may be billed the same as an in-person visit.  Please reach out to your insurance provider with any questions.    If during the course of the call the physician/provider feels a telephone visit is not appropriate, you will not be charged for this service.\"    Patient has given verbal consent for Telephone visit?  Yes    What phone number would you like to be contacted at? [phone]    How would you like to obtain your AVS? Lucius Beaulieu MA    Patients Glucose Data was obtained via telephone. Located in my note.     Due to the COVID 19 pandemic this visit was converted to a telephone visit in order to help prevent spread of infection in this patient and the general population.    Time of start: 10:30 am  Time of end: 10:44 am  Total duration of telephone visit: 14 minutes.    HPI  Mariusz Moon ) ISABEL Sharp is a 59 year old male with type 2 diabetes mellitus.  Telephone visit today for " diabetes care.  Pt was admitted 5/17/2020-5/29/2020 and underwent a heart transplant on 5/18/2020.  Red reports he continues to do well.   Pt's hx is significant for type 2 diabetes mellitus dx 3 years ago, HTN, hyperlipidemia, hx of ischemic heart disease s/p STEMI with ALYSSA 2019, LVAD placement, Stage 3 CKD, and obesity.  Apparently heart donor blood cx and respiratory cultures were + for H flu, staph and strep.  Red was also admitted 5/12-5/15/2020 for ANDREW due to nephrolithiasis complicated by hydronephrosis/ANDREW.  Post heart transplant he received high dose steroids and he is currently on a prednisone taper 15 mg each am and 10 mg each pm.  For his diabetes, pt is currently taking Lantus 45 units subcutaneous each pm and Novolog 1 unit/4 gms CHO with meals with correction scale  ( 1 unit/25 for BG > 140 ) and bedtime correction scale ( 1 unit/25 for BG > 200).  Pt's A1C was 7.8 % on 6/15/2020 and his A1C was 8.0 % on 5/12/2020 and his A1C was 9.5 % on 7/5/2019. Pt is anemic.  Pt send us blood sugar readings for review.  His blood sugar readings are stable.  He had a few high FBS values- he states he only took 1/2 his Lantus dose in the evening since his bs was < 150. He is very fearful of hypoglycemia.  He has had severe hypoglycemia in the past and had a low blood while in the hospital and was asymptomatic.  On ROS today, doing well. He states chest incision has healed.  No fevers or chills  Pt denies headaches, blurred vision, n/v, SOB at rest, abd pain, diarrhea,blood in the stool, melena, dysuria, hematuria or numbness in his feet or hands.  Red denies foot ulcers at this time.    Diabetes Care  Retinopathy:none; pt seen by Oph in Dec 2019.  Nephropathy:yes- hx of CKD/ANDREW.  Neuropathy: none.  Foot Exam:no exam today.  Taking aspirin: yes.  Lipids: LDL 82 on 6/15/2020. Pt is taking Crestor.  CAD: yes; s/p heart transplant on 5/18/2020.  Depression: no.  Insulin: Basal and meal time insulin with correction  scale ( 1 unit/25 for BG > 140 with meals and > 200 at bedtime).  Testing: glucose meter.    ROS  See under HPI.    Allergies  No Known Allergies    Medications  Current Outpatient Medications   Medication Sig Dispense Refill     ACCU-CHEK [place]TE PLUS test strip        acetaminophen (TYLENOL) 325 MG tablet Take 2 tablets (650 mg) by mouth every 4 hours as needed for other (multimodal surgical pain management along with NSAIDS and opioid medication as indicated based on pain control and physical function.)       amLODIPine (NORVASC) 5 MG tablet Take 1.5 tablets (7.5 mg) by mouth At Bedtime 90 tablet 3     aspirin (ASA) 81 MG chewable tablet Take 1 tablet (81 mg) by mouth daily 90 tablet 3     BD SILVANA U/F 32G X 4 MM insulin pen needle Use 3-4 daily. 250 each 4     calcium carbonate 600 mg-vitamin D 400 units (CALTRATE) 600-400 MG-UNIT per tablet Take 1 tablet by mouth 2 times daily (with meals)       clotrimazole 10 MG allison Take 1 Allison (10 mg) by mouth 4 times daily 360 Allison 1     insulin aspart (NOVOLOG PEN) 100 UNIT/ML pen 1 unit / 4 gms CHO with meals,plus correction insulin. Pt uses approx 40 units in 24 hrs. 3 mL 3     insulin aspart (NOVOLOG PEN) 100 UNIT/ML pen Correction Scale - HIGH INSULIN RESISTANCE DOSING     Do Not give Correction Insulin if Pre-Meal BG less than 140.   For Pre-Meal  - 164 give 1 unit.   For Pre-Meal  - 189 give 2 units.   For Pre-Meal  - 214 give 3 units.   For Pre-Meal  - 239 give 4 units.   For Pre-Meal  - 264 give 5 units.   For Pre-Meal  - 289 give 6 units.   For Pre-Meal  - 314 give 7 units.   For Pre-Meal  - 339 give 8 units.   For Pre-Meal  - 364 give 9 units.   For Pre-Meal BG greater than or equal to 365 give 10 units  To be given with prandial insulin, and based on pre-meal blood glucose.   Notify provider if glucose greater than or equal to 350 mg/dL after administration of correction dose. If given at mealtime,  administer within 30 minutes of start of meal 3 mL 0     insulin aspart (NOVOLOG PEN) 100 UNIT/ML pen Inject 1-7 Units Subcutaneous At Bedtime HIGH INSULIN RESISTANCE DOSING    Do Not give Bedtime Correction Insulin if BG less than 200.   For  - 224 give 1 units.   For  - 249 give 2 units.   For  - 274 give 3 units.   For  - 299 give 4 units.   For  - 324 give 5 units.   For  - 349 give 6 units.   For BG greater than or equal to 350 give 7 units.   Notify provider if glucose greater than or equal to 350 mg/dL after administration of correction dose. If given at mealtime, administer within 30 minutes of start of meal 3 mL 0     insulin glargine (LANTUS PEN) 100 UNIT/ML pen Inject 44 units subcutaneous daily. 3 mL 0     mycophenolate (GENERIC EQUIVALENT) 500 MG tablet Take 3 tablets (1,500 mg) by mouth 2 times daily 180 tablet 0     pantoprazole (PROTONIX) 40 MG EC tablet Take 1 tablet (40 mg) by mouth every morning (before breakfast) 30 tablet 1     predniSONE (DELTASONE) 10 MG tablet Take 1.5 tablets (15 mg) by mouth every morning AND 1 tablet (10 mg) every evening. 120 tablet 0     rosuvastatin (CRESTOR) 10 MG tablet Take 1 tablet (10 mg) by mouth daily 30 tablet 0     tacrolimus (GENERIC EQUIVALENT) 0.5 MG capsule Take one 0.5mg capsule WITH three 1mg capsules to equal 3.5mg every morning and evening. 120 capsule 0     tacrolimus (GENERIC EQUIVALENT) 1 MG capsule Take 3 capsules with one 0.5mg capule (3.5 mg) by mouth every morning AND 3 capsules  (3mg) every evening. 150 capsule 1     valGANciclovir (VALCYTE) 450 MG tablet Take 1 tablet (450 mg) by mouth daily 30 tablet 0       Family History  Mother and father with hx of type 2 DM.  Social History   reports that he has never smoked. He has never used smokeless tobacco. He reports that he does not drink alcohol or use drugs.     Past Medical History  Past Medical History:   Diagnosis Date     Acute kidney injury (H)      CKD  (chronic kidney disease)      Coronary artery disease      Diabetes mellitus (H)      HTN (hypertension)      Hyperlipidemia      Ischemic cardiomyopathy      LV (left ventricular) mural thrombus      Paroxysmal atrial flutter (H)      RVF (right ventricular failure) (H)      Systolic heart failure (H)      Ventricular tachycardia (H)        Past Surgical History:   Procedure Laterality Date     COLONOSCOPY N/A 12/7/2018    Procedure: COLONOSCOPY;  Surgeon: rKish Mercado MD;  Location: [facility] OR     CV HEART BIOPSY N/A 5/24/2020    Procedure: Heart Biopsy;  Surgeon: Kit Bacon MD;  Location: [facility] HEART CARDIAC CATH LAB     CV HEART BIOPSY N/A 6/1/2020    Procedure: CV HEART BIOPSY;  Surgeon: Kit Bacon MD;  Location: [facility] HEART CARDIAC CATH LAB     CV HEART BIOPSY N/A 6/8/2020    Procedure: CV HEART BIOPSY;  Surgeon: Kit Bacon MD;  Location: [facility] HEART CARDIAC CATH LAB     CV HEART BIOPSY N/A 6/15/2020    Procedure: CV HEART BIOPSY;  Surgeon: Kit Bacon MD;  Location: [facility] HEART CARDIAC CATH LAB     CV RIGHT HEART CATH N/A 2/19/2019    Procedure: RHC;  Surgeon: Brian Long MD;  Location: [facility] HEART CARDIAC CATH LAB     CV RIGHT HEART CATH N/A 7/5/2019    Procedure: CV RIGHT HEART CATH;  Surgeon: Khris Mcclelland MD;  Location: [facility] HEART CARDIAC CATH LAB     CV RIGHT HEART CATH N/A 5/24/2020    Procedure: Right Heart Cath;  Surgeon: Kit Bacon MD;  Location: [facility] HEART CARDIAC CATH LAB     CV RIGHT HEART CATH N/A 6/1/2020    Procedure: CV RIGHT HEART CATH;  Surgeon: Kit Bacon MD;  Location: [facility] HEART CARDIAC CATH LAB     CV RIGHT HEART CATH N/A 6/8/2020    Procedure: CV RIGHT HEART CATH;  Surgeon: Kit Bacon MD;  Location: [facility] HEART CARDIAC CATH LAB     CV RIGHT HEART CATH N/A 6/15/2020    Procedure: CV RIGHT HEART CATH;  Surgeon: Kit Bacon MD;  Location:   HEART CARDIAC CATH LAB     HC PRQ TRLUML CORONARY ANGIOPLASTY ADDL BRANCH      PCI and ALYSSA to ostial LAD on 6/27/18     IMPLANT AUTOMATIC IMPLANTABLE CARDIOVERTER DEFIBRILLATOR       INSERT VENTRICULAR ASSIST DEVICE LEFT (HEARTMATE II) N/A 12/31/2018    Procedure: Median Sternotomy, On Cardiopulmonary Bypass Pump, Insertion of Left Ventricular Assist Device (Heartmate III);  Surgeon: Kamaljit Baker MD;  Location: [facility] OR     PICC DOUBLE LUMEN PLACEMENT Right 05/22/2020    5FR DL PICC inserted at right basilic vein, length 38cm.     TRANSPLANT HEART RECIPIENT AFTER HEART MATE N/A 5/18/2020    Procedure: REDO MEDIAN STERNOTOMY, LYSIS OF ADHESIONS, ON CARDIOPULMONARY BYPASS PUMP, HEART TRANSPLANT RECIPIENT, REMOVAL OF LEFT VENTRICULAR ASSIST DEVICE, REMOVAL OF AUTOMATED IMPLANTABLE CARDIOVERTER DEFIBRILLATOR;  Surgeon: Elder Willis MD;  Location: [facility] OR       Physical Exam    No exam today.    RESULTS  Creatinine   Date Value Ref Range Status   06/15/2020 1.89 (H) 0.66 - 1.25 mg/dL Final     GFR Estimate   Date Value Ref Range Status   06/15/2020 38 (L) >60 mL/min/[1.73_m2] Final     Comment:     Non  GFR Calc  Starting 12/18/2018, serum creatinine based estimated GFR (eGFR) will be   calculated using the Chronic Kidney Disease Epidemiology Collaboration   (CKD-EPI) equation.       Hemoglobin A1C   Date Value Ref Range Status   06/15/2020 7.8 (H) 0 - 5.6 % Final     Comment:     Normal <5.7% Prediabetes 5.7-6.4%  Diabetes 6.5% or higher - adopted from ADA   consensus guidelines.       Potassium   Date Value Ref Range Status   06/15/2020 4.8 3.4 - 5.3 mmol/L Final     ALT   Date Value Ref Range Status   06/15/2020 50 0 - 70 U/L Final     AST   Date Value Ref Range Status   06/15/2020 24 0 - 45 U/L Final     TSH   Date Value Ref Range Status   06/26/2019 3.94 0.40 - 4.00 mU/L Final     T4 Free   Date Value Ref Range Status   07/23/2018 1.05 0.76 - 1.46 ng/dL Final       Cholesterol   Date Value Ref  Range Status   06/15/2020 183 <200 mg/dL Final   08/06/2018 126 <200 mg/dL Final     HDL Cholesterol   Date Value Ref Range Status   06/15/2020 86 >39 mg/dL Final   08/06/2018 52 >39 mg/dL Final     LDL Cholesterol Calculated   Date Value Ref Range Status   06/15/2020 82 <100 mg/dL Final     Comment:     Desirable:       <100 mg/dl   08/06/2018 24 <100 mg/dL Final     Comment:     Desirable:       <100 mg/dl     Triglycerides   Date Value Ref Range Status   06/15/2020 73 <150 mg/dL Final   08/06/2018 246 (H) <150 mg/dL Final     Comment:     Borderline high:  150-199 mg/dl  High:             200-499 mg/dl  Very high:       >499 mg/dl       A1C   7.8 % on  6/15/2020    ASSESSMENT/PLAN:      1.  TYPE 2 DIABETES MELLITUS: Type 2 diabetes mellitus complicated by hx of CAD and ischemic cardiomyopathy s/p heart transplant on 5/18/2020. Pt is doing well at this time and remains on a prednisone taper 15 mg each am and 10 mg each pm.  Red's blood sugar values are stable at this time.  Pt instructed to take Lantus 45 units daily and continue Novolog I/C ratio to 1:4 with current correction scale ( 1 unit/25 for BG > 140 ).  Days of cardiac rehab, he was instructed to take 1/2 Novolog dose the meal prior to his rehab to help prevent hypoglycemia.  I will have our CDE call patient to discuss use of a DexcomG6 sensor.  I plan to call Red on 7/21/2020 to review his blood sugar readings. If he has any questions regarding his diabetes care, he is to call our clinic. I provided him with our contact number today.  Pt was seen by Oph in Dec 2019 without retinopathy.  He denies any sx of neuropathy or foot ulcers at this time.  He is taking ASA daily.    2.  CKD/ANDREW: Most recent creat was 1.89 with GFR 38 mL/min on 6/15/2020.    3.  HX OF ISCHEMIC CARDIOMYOPATHY: S/P heart transplant on 5/18/2020 and followed closely by transplant staff here.     4.  FOLLOW UP : with me on7/21/2020 at 1 pm.  He is to call if he has any questions  regarding his diabetes care.          06/10/2020  228, 243, 316, 233, 143    06/11/2020  174, 113, 134    06/12/2020  122, 405, 269    06/13/2020  123, 163, 120, 83    06/14/2020  138, 180, 91, 156    06/15/2020  139, 130    06/16/2020  246, 185, 128    06/17/2020  214, 168, 252, 109, 105    06/18/2020  225    Again, thank you for allowing me to participate in the care of your patient.      Sincerely,    Jeannette Schafer PA-C

## 2020-06-22 ENCOUNTER — TELEPHONE (OUTPATIENT)
Dept: TRANSPLANT | Facility: CLINIC | Age: 60
End: 2020-06-22

## 2020-06-22 ENCOUNTER — RESULTS ONLY (OUTPATIENT)
Dept: OTHER | Facility: CLINIC | Age: 60
End: 2020-06-22

## 2020-06-22 DIAGNOSIS — Z94.1 HEART REPLACED BY TRANSPLANT (H): ICD-10-CM

## 2020-06-22 DIAGNOSIS — Z11.59 ENCOUNTER FOR SCREENING FOR OTHER VIRAL DISEASES: ICD-10-CM

## 2020-06-22 DIAGNOSIS — Z94.1 STATUS POST HEART TRANSPLANTATION (H): ICD-10-CM

## 2020-06-22 LAB
ALBUMIN SERPL-MCNC: 3.1 G/DL (ref 3.4–5)
ALP SERPL-CCNC: 39 U/L (ref 40–150)
ALT SERPL W P-5'-P-CCNC: 46 U/L (ref 0–70)
ANION GAP SERPL CALCULATED.3IONS-SCNC: 8 MMOL/L (ref 3–14)
AST SERPL W P-5'-P-CCNC: 16 U/L (ref 0–45)
BILIRUB SERPL-MCNC: 0.5 MG/DL (ref 0.2–1.3)
BUN SERPL-MCNC: 58 MG/DL (ref 7–30)
CALCIUM SERPL-MCNC: 8.3 MG/DL (ref 8.5–10.1)
CHLORIDE SERPL-SCNC: 106 MMOL/L (ref 94–109)
CO2 SERPL-SCNC: 21 MMOL/L (ref 20–32)
CREAT SERPL-MCNC: 1.36 MG/DL (ref 0.66–1.25)
GFR SERPL CREATININE-BSD FRML MDRD: 57 ML/MIN/{1.73_M2}
GLUCOSE SERPL-MCNC: 205 MG/DL (ref 70–99)
POTASSIUM SERPL-SCNC: 4.2 MMOL/L (ref 3.4–5.3)
PROT SERPL-MCNC: 5.9 G/DL (ref 6.8–8.8)
SODIUM SERPL-SCNC: 135 MMOL/L (ref 133–144)
TACROLIMUS BLD-MCNC: 8.5 UG/L (ref 5–15)
TME LAST DOSE: NORMAL H

## 2020-06-22 RX ORDER — ROSUVASTATIN CALCIUM 10 MG/1
10 TABLET, COATED ORAL DAILY
Qty: 30 TABLET | Refills: 0 | Status: SHIPPED | OUTPATIENT
Start: 2020-06-22 | End: 2020-07-27

## 2020-06-22 RX ORDER — BLOOD SUGAR DIAGNOSTIC
STRIP MISCELLANEOUS
Qty: 300 STRIP | Refills: 0 | Status: SHIPPED | OUTPATIENT
Start: 2020-06-22 | End: 2021-10-07

## 2020-06-22 RX ORDER — PANTOPRAZOLE SODIUM 40 MG/1
40 TABLET, DELAYED RELEASE ORAL
Qty: 30 TABLET | Refills: 0 | Status: SHIPPED | OUTPATIENT
Start: 2020-06-22 | End: 2020-07-27

## 2020-06-22 NOTE — TELEPHONE ENCOUNTER
Pt called to update on BP after increase in Amlodipine last week.  Pt states BP has ranged from 120's over 80's to 140's over 90's.  Pt instructed to call he get two consecutive readings in the 140's range.  Staff message sent to Dr. Ortiz regarding BP.  Pt states understanding instructions.

## 2020-06-22 NOTE — RESULT ENCOUNTER NOTE
Tacrolimus level 8.5.  Goal 10-12.  Pt to increase dose to 3.5mg BID and recheck at cath lab visit next week.

## 2020-06-23 ENCOUNTER — HOSPITAL ENCOUNTER (OUTPATIENT)
Dept: CARDIAC REHAB | Facility: CLINIC | Age: 60
End: 2020-06-23
Attending: NURSE PRACTITIONER
Payer: COMMERCIAL

## 2020-06-23 ENCOUNTER — TELEPHONE (OUTPATIENT)
Dept: TRANSPLANT | Facility: CLINIC | Age: 60
End: 2020-06-23

## 2020-06-23 DIAGNOSIS — I15.8 OTHER SECONDARY HYPERTENSION: ICD-10-CM

## 2020-06-23 PROCEDURE — 93798 PHYS/QHP OP CAR RHAB W/ECG: CPT

## 2020-06-23 PROCEDURE — 40000116 ZZH STATISTIC OP CR VISIT

## 2020-06-23 RX ORDER — AMLODIPINE BESYLATE 5 MG/1
10 TABLET ORAL AT BEDTIME
Qty: 90 TABLET | Refills: 3 | Status: ON HOLD | OUTPATIENT
Start: 2020-06-23 | End: 2020-09-23

## 2020-06-23 NOTE — TELEPHONE ENCOUNTER
Dr. Ortiz updated regarding pt's BP, she would like pt to increase his Amlodipine to 10mg nightly.  Pt called and notified of med change.  Also OK for pt to return to [place] next week after biopsy, but to be aware that he may need to come back if something comes up on his heart biopsy.  Pt states understanding instructions and plan of care.

## 2020-06-24 LAB
DONOR IDENTIFICATION: NORMAL
DSA COMMENTS: NORMAL
DSA PRESENT: NO
DSA TEST METHOD: NORMAL
ORGAN: NORMAL
SA1 CELL: NORMAL
SA1 COMMENTS: NORMAL
SA1 HI RISK ABY: NORMAL
SA1 MOD RISK ABY: NORMAL
SA1 TEST METHOD: NORMAL
SA2 CELL: NORMAL
SA2 COMMENTS: NORMAL
SA2 HI RISK ABY UA: NORMAL
SA2 MOD RISK ABY: NORMAL
SA2 TEST METHOD: NORMAL
UNACCEPTABLE ANTIGEN: NORMAL
UNOS CPRA: 0

## 2020-06-25 ENCOUNTER — HOSPITAL ENCOUNTER (OUTPATIENT)
Dept: CARDIAC REHAB | Facility: CLINIC | Age: 60
End: 2020-06-25
Attending: NURSE PRACTITIONER
Payer: COMMERCIAL

## 2020-06-25 PROCEDURE — 40000116 ZZH STATISTIC OP CR VISIT

## 2020-06-25 PROCEDURE — 93798 PHYS/QHP OP CAR RHAB W/ECG: CPT

## 2020-06-26 ENCOUNTER — PRE VISIT (OUTPATIENT)
Dept: TRANSPLANT | Facility: CLINIC | Age: 60
End: 2020-06-26

## 2020-06-26 DIAGNOSIS — Z94.1 HEART REPLACED BY TRANSPLANT (H): Primary | ICD-10-CM

## 2020-06-26 RX ORDER — LIDOCAINE 40 MG/G
CREAM TOPICAL
Status: CANCELLED | OUTPATIENT
Start: 2020-06-26

## 2020-06-29 ENCOUNTER — TELEPHONE (OUTPATIENT)
Dept: CARDIOLOGY | Facility: CLINIC | Age: 60
End: 2020-06-29

## 2020-06-29 ENCOUNTER — HOSPITAL ENCOUNTER (OUTPATIENT)
Dept: CARDIAC REHAB | Facility: CLINIC | Age: 60
End: 2020-06-29
Attending: NURSE PRACTITIONER
Payer: COMMERCIAL

## 2020-06-29 PROCEDURE — 93798 PHYS/QHP OP CAR RHAB W/ECG: CPT

## 2020-06-29 PROCEDURE — 40000116 ZZH STATISTIC OP CR VISIT

## 2020-06-29 NOTE — TELEPHONE ENCOUNTER
Left voicemail for patient to complete Travel Screen for Cardiac Cath Lab appointment on 6/30 and inform patient of updated Visitor Policy.       No covid test d/t heart tx

## 2020-06-30 ENCOUNTER — APPOINTMENT (OUTPATIENT)
Dept: MEDSURG UNIT | Facility: CLINIC | Age: 60
End: 2020-06-30
Attending: INTERNAL MEDICINE
Payer: COMMERCIAL

## 2020-06-30 ENCOUNTER — HOSPITAL ENCOUNTER (OUTPATIENT)
Facility: CLINIC | Age: 60
Discharge: HOME | End: 2020-06-30
Attending: INTERNAL MEDICINE | Admitting: INTERNAL MEDICINE
Payer: COMMERCIAL

## 2020-06-30 ENCOUNTER — TELEPHONE (OUTPATIENT)
Dept: TRANSPLANT | Facility: CLINIC | Age: 60
End: 2020-06-30

## 2020-06-30 ENCOUNTER — SURGERY (OUTPATIENT)
Age: 60
End: 2020-06-30
Payer: COMMERCIAL

## 2020-06-30 VITALS
OXYGEN SATURATION: 98 % | SYSTOLIC BLOOD PRESSURE: 160 MMHG | RESPIRATION RATE: 16 BRPM | TEMPERATURE: 97.9 F | BODY MASS INDEX: 31.45 KG/M2 | WEIGHT: 184.2 LBS | DIASTOLIC BLOOD PRESSURE: 104 MMHG | HEIGHT: 64 IN

## 2020-06-30 DIAGNOSIS — Z94.1 HEART REPLACED BY TRANSPLANT (H): Primary | ICD-10-CM

## 2020-06-30 DIAGNOSIS — Z94.1 STATUS POST HEART TRANSPLANTATION (H): ICD-10-CM

## 2020-06-30 DIAGNOSIS — Z94.1 HEART REPLACED BY TRANSPLANT (H): ICD-10-CM

## 2020-06-30 DIAGNOSIS — Z94.1 STATUS POST HEART TRANSPLANTATION (H): Primary | ICD-10-CM

## 2020-06-30 DIAGNOSIS — E83.42 HYPOMAGNESEMIA: Primary | ICD-10-CM

## 2020-06-30 LAB
ALBUMIN SERPL-MCNC: 3.3 G/DL (ref 3.4–5)
ALP SERPL-CCNC: 43 U/L (ref 40–150)
ALT SERPL W P-5'-P-CCNC: 51 U/L (ref 0–70)
ANION GAP SERPL CALCULATED.3IONS-SCNC: 8 MMOL/L (ref 3–14)
AST SERPL W P-5'-P-CCNC: 24 U/L (ref 0–45)
BILIRUB SERPL-MCNC: 0.4 MG/DL (ref 0.2–1.3)
BUN SERPL-MCNC: 76 MG/DL (ref 7–30)
CALCIUM SERPL-MCNC: 9 MG/DL (ref 8.5–10.1)
CHLORIDE SERPL-SCNC: 110 MMOL/L (ref 94–109)
CO2 SERPL-SCNC: 21 MMOL/L (ref 20–32)
CREAT SERPL-MCNC: 1.75 MG/DL (ref 0.66–1.25)
ERYTHROCYTE [DISTWIDTH] IN BLOOD BY AUTOMATED COUNT: 18.4 % (ref 10–15)
GFR SERPL CREATININE-BSD FRML MDRD: 42 ML/MIN/{1.73_M2}
GLUCOSE SERPL-MCNC: 167 MG/DL (ref 70–99)
HCT VFR BLD AUTO: 27.8 % (ref 40–53)
HGB BLD-MCNC: 8.4 G/DL (ref 13.3–17.7)
INTERPRETATION ECG - MUSE: NORMAL
MAGNESIUM SERPL-MCNC: 1.3 MG/DL (ref 1.6–2.3)
MCH RBC QN AUTO: 26.3 PG (ref 26.5–33)
MCHC RBC AUTO-ENTMCNC: 30.2 G/DL (ref 31.5–36.5)
MCV RBC AUTO: 87 FL (ref 78–100)
PHOSPHATE SERPL-MCNC: 3.5 MG/DL (ref 2.5–4.5)
PLATELET # BLD AUTO: 231 10E9/L (ref 150–450)
POTASSIUM SERPL-SCNC: 4.9 MMOL/L (ref 3.4–5.3)
PROT SERPL-MCNC: 6.2 G/DL (ref 6.8–8.8)
RBC # BLD AUTO: 3.19 10E12/L (ref 4.4–5.9)
SODIUM SERPL-SCNC: 138 MMOL/L (ref 133–144)
TACROLIMUS BLD-MCNC: 11.3 UG/L (ref 5–15)
TME LAST DOSE: NORMAL H
WBC # BLD AUTO: 5.7 10E9/L (ref 4–11)

## 2020-06-30 PROCEDURE — 83735 ASSAY OF MAGNESIUM: CPT | Performed by: INTERNAL MEDICINE

## 2020-06-30 PROCEDURE — 84100 ASSAY OF PHOSPHORUS: CPT | Performed by: INTERNAL MEDICINE

## 2020-06-30 PROCEDURE — 25000125 ZZHC RX 250: Performed by: INTERNAL MEDICINE

## 2020-06-30 PROCEDURE — 85027 COMPLETE CBC AUTOMATED: CPT | Performed by: INTERNAL MEDICINE

## 2020-06-30 PROCEDURE — C1894 INTRO/SHEATH, NON-LASER: HCPCS | Performed by: INTERNAL MEDICINE

## 2020-06-30 PROCEDURE — 40000166 ZZH STATISTIC PP CARE STAGE 1

## 2020-06-30 PROCEDURE — 93505 ENDOMYOCARDIAL BIOPSY: CPT | Mod: 26 | Performed by: INTERNAL MEDICINE

## 2020-06-30 PROCEDURE — 36415 COLL VENOUS BLD VENIPUNCTURE: CPT | Performed by: INTERNAL MEDICINE

## 2020-06-30 PROCEDURE — 40000065 ZZH STATISTIC EKG NON-CHARGEABLE

## 2020-06-30 PROCEDURE — 88307 TISSUE EXAM BY PATHOLOGIST: CPT | Performed by: INTERNAL MEDICINE

## 2020-06-30 PROCEDURE — 27210794 ZZH OR GENERAL SUPPLY STERILE: Performed by: INTERNAL MEDICINE

## 2020-06-30 PROCEDURE — 93010 ELECTROCARDIOGRAM REPORT: CPT | Performed by: INTERNAL MEDICINE

## 2020-06-30 PROCEDURE — 88350 IMFLUOR EA ADDL 1ANTB STN PX: CPT | Performed by: INTERNAL MEDICINE

## 2020-06-30 PROCEDURE — 93505 ENDOMYOCARDIAL BIOPSY: CPT | Performed by: INTERNAL MEDICINE

## 2020-06-30 PROCEDURE — 80197 ASSAY OF TACROLIMUS: CPT | Performed by: INTERNAL MEDICINE

## 2020-06-30 PROCEDURE — 88346 IMFLUOR 1ST 1ANTB STAIN PX: CPT | Performed by: INTERNAL MEDICINE

## 2020-06-30 PROCEDURE — 80053 COMPREHEN METABOLIC PANEL: CPT | Performed by: INTERNAL MEDICINE

## 2020-06-30 PROCEDURE — 93005 ELECTROCARDIOGRAM TRACING: CPT

## 2020-06-30 PROCEDURE — 99214 OFFICE O/P EST MOD 30 MIN: CPT | Mod: 25 | Performed by: NURSE PRACTITIONER

## 2020-06-30 RX ORDER — HYDRALAZINE HYDROCHLORIDE 25 MG/1
25 TABLET, FILM COATED ORAL 3 TIMES DAILY
Qty: 270 TABLET | Refills: 3 | Status: ON HOLD | OUTPATIENT
Start: 2020-06-30 | End: 2020-08-13

## 2020-06-30 RX ORDER — PENTAMIDINE ISETHIONATE 300 MG/300MG
300 INHALANT RESPIRATORY (INHALATION) ONCE
Status: CANCELLED | OUTPATIENT
Start: 2020-07-28

## 2020-06-30 RX ORDER — LIDOCAINE 40 MG/G
CREAM TOPICAL
Status: COMPLETED | OUTPATIENT
Start: 2020-06-30 | End: 2020-06-30

## 2020-06-30 RX ORDER — PREDNISONE 10 MG/1
TABLET ORAL
Qty: 120 TABLET | Refills: 3 | Status: SHIPPED | OUTPATIENT
Start: 2020-06-30 | End: 2020-07-02

## 2020-06-30 RX ORDER — PENTAMIDINE ISETHIONATE 300 MG/300MG
300 INHALANT RESPIRATORY (INHALATION) ONCE
Status: CANCELLED | OUTPATIENT
Start: 2020-06-30

## 2020-06-30 RX ORDER — ALBUTEROL SULFATE 0.83 MG/ML
2.5 SOLUTION RESPIRATORY (INHALATION) ONCE
Status: COMPLETED | OUTPATIENT
Start: 2020-06-30 | End: 2020-06-30

## 2020-06-30 RX ORDER — ALBUTEROL SULFATE 0.83 MG/ML
2.5 SOLUTION RESPIRATORY (INHALATION) ONCE
Status: CANCELLED | OUTPATIENT
Start: 2020-07-28

## 2020-06-30 RX ORDER — ALBUTEROL SULFATE 0.83 MG/ML
2.5 SOLUTION RESPIRATORY (INHALATION) ONCE
Status: CANCELLED | OUTPATIENT
Start: 2020-06-30

## 2020-06-30 RX ORDER — PENTAMIDINE ISETHIONATE 300 MG/300MG
300 INHALANT RESPIRATORY (INHALATION) ONCE
Status: COMPLETED | OUTPATIENT
Start: 2020-06-30 | End: 2020-06-30

## 2020-06-30 RX ADMIN — LIDOCAINE HYDROCHLORIDE 5 ML: 10 INJECTION, SOLUTION EPIDURAL; INFILTRATION; INTRACAUDAL; PERINEURAL at 10:10

## 2020-06-30 RX ADMIN — ALBUTEROL SULFATE 2.5 MG: 0.83 SOLUTION RESPIRATORY (INHALATION) at 13:10

## 2020-06-30 RX ADMIN — LIDOCAINE: 40 CREAM TOPICAL at 09:10

## 2020-06-30 RX ADMIN — PENTAMIDINE ISETHIONATE 300 MG: 300 INHALANT RESPIRATORY (INHALATION) at 13:11

## 2020-06-30 ASSESSMENT — MIFFLIN-ST. JEOR: SCORE: 1571.53

## 2020-06-30 NOTE — PROGRESS NOTES
Patient arrived on 2A for RHC/biopsy.  LMX to right neck.  Awaiting consent, otherwise prep complete.

## 2020-06-30 NOTE — IP AVS SNAPSHOT
[address] [address]                                    After Visit Summary   6/30/2020    Mariusz Sharp    MRN: [id number]           After Visit Summary Signature Page    I have received my discharge instructions, and my questions have been answered. I have discussed any challenges I see with this plan with the nurse or doctor.    ..........................................................................................................................................  Patient/Patient Representative Signature      ..........................................................................................................................................  Patient Representative Print Name and Relationship to Patient    ..................................................               ................................................  Date                                   Time    ..........................................................................................................................................  Reviewed by Signature/Title    ...................................................              ..............................................  Date                                               Time          22EPIC Rev 08/18

## 2020-06-30 NOTE — PROGRESS NOTES
Pt on 2A post right heart cath; pt awake and alert, denies pain. R neck site is dry and intact. EKG done now per order. Dinora H to see pt pre procedure; requested EKG and then pt could discharge.

## 2020-06-30 NOTE — PROGRESS NOTES
ADULT HEART TRANSPLANT     HPI:   Mr. Sharp is a 59 year old male who presents to 2A today after biopsy for routine heart transplant follow-up. 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 First biopsy 5/24 was negative for significant rejection. RHC 5/24 RA 7, mPA 24, PCW 15, and CI 2.6.  TTE 5/23 showed stable graft function, LVEF 65-70% and normal RV size/function. Post-operative course was c/b NSVT (with no hemodynamic compromise), leukocytosis with C Acnes growing from his former LVAD site (thought to be a contaminant, but treated with IV vanco --> po doxy through 6/1/20, 14d course total), and in the setting of donor blood growing S anginosus and Veillonella (also thought to be a contaminant, but treated with Vanco/zosyn -> Vanco/Flagyl for a total of 7 days). He also had hyperkalemia, which improved following kayexalate and stopping bactrim. He discharged 5/29 to [facility].    Since discharge patient has been doing well.  Last right heart cath shows normal filling pressures echocardiogram shows normal graft function.  Biopsies have been negative. He has been very active walking a few miles a day without any exertional chest pain, shortness of breath, lightheadedness.  He has had elevated blood pressures as an outpatient as we have been increasing amlodipine.  Blood sugars are controlled.  His only complaint today is muscle cramps that feel like charley horses in his legs.  His blood sugars are controlled.  He denies lower extremity edema, orthopnea, PND.  Denies nausea, vomiting, diarrhea, night sweats, mouth sores, oral lesions, joint pains, fever, chills.       PAST MEDICAL HISTORY:  Past Medical History:   Diagnosis Date     Acute kidney injury (H)      CKD (chronic kidney disease)      Coronary artery disease       Diabetes mellitus (H)      HTN (hypertension)      Hyperlipidemia      Ischemic cardiomyopathy      LV (left ventricular) mural thrombus      Paroxysmal atrial flutter (H)      RVF (right ventricular failure) (H)      Systolic heart failure (H)      Ventricular tachycardia (H)        FAMILY HISTORY:  History reviewed. No pertinent family history.    SOCIAL HISTORY:  Socioeconomic History     Marital status: Single   Tobacco Use     Smoking status: Never Smoker     Smokeless tobacco: Never Used   Substance and Sexual Activity     Alcohol use: No     Frequency: Never     Drug use: No     Sexual activity: None       CURRENT MEDICATIONS:  No current facility-administered medications on file prior to encounter.   aspirin (ASA) 81 MG chewable tablet, Take 1 tablet (81 mg) by mouth daily  calcium carbonate 600 mg-vitamin D 400 units (CALTRATE) 600-400 MG-UNIT per tablet, Take 1 tablet by mouth 2 times daily (with meals)  insulin aspart (NOVOLOG PEN) 100 UNIT/ML pen, Correction Scale - HIGH INSULIN RESISTANCE DOSING     Do Not give Correction Insulin if Pre-Meal BG less than 140.   For Pre-Meal  - 164 give 1 unit.   For Pre-Meal  - 189 give 2 units.   For Pre-Meal  - 214 give 3 units.   For Pre-Meal  - 239 give 4 units.   For Pre-Meal  - 264 give 5 units.   For Pre-Meal  - 289 give 6 units.   For Pre-Meal  - 314 give 7 units.   For Pre-Meal  - 339 give 8 units.   For Pre-Meal  - 364 give 9 units.   For Pre-Meal BG greater than or equal to 365 give 10 units  To be given with prandial insulin, and based on pre-meal blood glucose.   Notify provider if glucose greater than or equal to 350 mg/dL after administration of correction dose. If given at mealtime, administer within 30 minutes of start of meal  insulin aspart (NOVOLOG PEN) 100 UNIT/ML pen, Inject 1-7 Units Subcutaneous At Bedtime HIGH INSULIN RESISTANCE DOSING    Do Not give Bedtime Correction Insulin if BG less than  "200.   For  - 224 give 1 units.   For  - 249 give 2 units.   For  - 274 give 3 units.   For  - 299 give 4 units.   For  - 324 give 5 units.   For  - 349 give 6 units.   For BG greater than or equal to 350 give 7 units.   Notify provider if glucose greater than or equal to 350 mg/dL after administration of correction dose. If given at mealtime, administer within 30 minutes of start of meal  acetaminophen (TYLENOL) 325 MG tablet, Take 2 tablets (650 mg) by mouth every 4 hours as needed for other (multimodal surgical pain management along with NSAIDS and opioid medication as indicated based on pain control and physical function.)        ROS:  See HPI    EXAM:  BP (!) 160/104 (BP Location: Right arm)   Temp 97.9  F (36.6  C) (Oral)   Resp 16   Ht 1.626 m (5' 4\")   Wt 83.6 kg (184 lb 3.2 oz)   SpO2 98%   BMI 31.62 kg/m      GENERAL: Appears comfortable, in no acute distress.   HEENT: Eye symmetrical, no discharge or icterus bilaterally. Mucous membranes moist and without lesions. No thrush.   CV: Tachycardic per baseline and regular, +S1S2, no murmur, rub, or gallop. JVP just above clavicel at 75 degrees,   RESPIRATORY: Respirations regular, even, and unlabored. Lungs CTA throughout.   GI: Soft, obese and non distended with normoactive bowel sounds present in all quadrants. No tenderness, rebound, guarding. No hepatomegaly.   EXTREMITIES: No peripheral edema. 2+ bilateral pedal pulses.   NEUROLOGIC: Alert and oriented x 3. No focal deficits.   MUSCULOSKELETAL: No joint swelling or tenderness.   SKIN: No jaundice. No rashes or lesions.  Sternum well-healed without signs of infection.  Old chest tube site dressing clean dry and intact.    Labs - reviewed with patient in clinic today:  CBC RESULTS:  Lab Results   Component Value Date    WBC 5.7 06/30/2020    RBC 3.19 (L) 06/30/2020    HGB 8.4 (L) 06/30/2020    HCT 27.8 (L) 06/30/2020    MCV 87 06/30/2020    MCH 26.3 (L) 06/30/2020 "    MCHC 30.2 (L) 06/30/2020    RDW 18.4 (H) 06/30/2020     06/30/2020       CMP RESULTS:  Lab Results   Component Value Date     06/30/2020    POTASSIUM 4.9 06/30/2020    CHLORIDE 110 (H) 06/30/2020    CO2 21 06/30/2020    ANIONGAP 8 06/30/2020     (H) 06/30/2020    BUN 76 (H) 06/30/2020    CR 1.75 (H) 06/30/2020    GFRESTIMATED 42 (L) 06/30/2020    GFRESTBLACK 49 (L) 06/30/2020    ARLENE 9.0 06/30/2020    BILITOTAL 0.4 06/30/2020    ALBUMIN 3.3 (L) 06/30/2020    ALKPHOS 43 06/30/2020    ALT 51 06/30/2020    AST 24 06/30/2020        INR RESULTS:  Lab Results   Component Value Date    INR 1.16 (H) 06/04/2020       LIPID RESULTS:  Lab Results   Component Value Date    CHOL 183 06/15/2020    HDL 86 06/15/2020    LDL 82 06/15/2020    TRIG 73 06/15/2020       IMMUNOSUPPRESSANT LEVELS:  Lab Results   Component Value Date    TACROL 8.5 06/22/2020    DOSTAC 6/21/20 2100 06/22/2020       No components found for: CK  Lab Results   Component Value Date    MAG 1.3 (L) 06/30/2020     Lab Results   Component Value Date    A1C 7.8 (H) 06/15/2020     Lab Results   Component Value Date    PHOS 3.5 06/30/2020     Lab Results   Component Value Date    NTBNP 404 (H) 06/26/2019     Lab Results   Component Value Date    SAITESTMET [facility] 06/22/2020    SAICELL Class I 06/22/2020    [id number] Cw:15 06/22/2020    UH3GSVVOAT Cw:18 06/22/2020    SAIREPCOM  06/22/2020      Test performed by modified procedure. Serum heat inactivated and tested   by a modified ([place]) protocol including fetal calf serum addition.   High-risk, mfi >3,000. Mod-risk, mfi 500-3,000.       Lab Results   Component Value Date    SAIITESTME [facility] 06/22/2020    SAIICELL Class II 06/22/2020    CY2LSSHBG None 06/22/2020    [id number] None 06/22/2020    SAIIREPCOM  06/22/2020      Test performed by modified procedure. Serum heat inactivated and tested   by a modified ([place]) protocol including fetal calf serum addition.   High-risk, mfi >3,000. Mod-risk,  mfi 500-3,000.       Lab Results   Component Value Date    CSPEC EDTA PLASMA 06/15/2020       Diagnostic Studies:  RHC 6/15/20  RA 7/6/5  RV 28/7  PA 27/17/20  PCWP 17/21/17  AMRIT 5.8/3.08  TD 5.43/2.88  PVR 0.51 (AMRIT)  SVR 1641 (AMRIT)     TTE 6/15/20  Global and regional left ventricular function is normal with an EF of 60-65%.  Right ventricular function, chamber size, wall motion, and thickness are normal.  The inferior vena cava is normal.  No pericardial effusion is present.    Assessment/Plan:  Mr. Sharp is a 59 year old male who is s/p orthotopic heart transplant on 5/18/20 who presents to for routine follow-up. 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  Postop course complicated by NSVT, leukocytosis, and C acnes growing from former LVAD site treated with 14 days of antibiotics. Donor blood growing S anginosus and Veillonella (also thought to be a contaminant, but treated with Vanco/zosyn --> Vanco/Flagyl for total 7 days). He also had hyperkalemia, which improved following kayexalate and stopping bactrim.  His graft function remains normal by echocardiogram. Last right heart cath shows normal filling pressures normal cardiac output. He does not have DS's. BP have been elevated but have improved with increasing amlodipine. Angiogram has been deferred due to renal dysfunction. Starting Mg replacement for low Mg.     # Status post OHT on 5/18/20, history of ICM  # Chronic immunosuppression  # HTN  * Rejection history: none.   * Recent immunosuppression changes: none  * Last biopsy: 6/15 NER  * Intolerance to medications: none    Graft function:  - Fluid status: RHC pending, appears near euvolemic, not on diuretic  - BP: improved since increasing amlodipine to 10 mg two days ago, still occasionally elevated, will call him in two days to check    Addendum: PCWP 20 today on RHC, will start hydralazine 25 mg tid for HTN/afterload reduction  - HRs: > 80    Immunosuppression:  - Received Simulect 5/18 and 5/22  - MMF 1500mg bid  - tacrolimus goal level 10-12  - prednisone per taper, currently 15 mg AM 10 mg in PM     PPx:  - CAV: aspirin 81mg daily, crestor 10mg daily  - PCP: Bactrim stopped 5/27 due to hyperkalemia, pentamidine due today, may be able to start Bactrim next month  - CMV: Valcyte 450mg daily (renally dosed), x6 months (through 11/2020)  - Thrush: Nystatin QID  - Osteoporosis: calcium/vitamin D supplements  - GI: Pantoprazole 40mg daily    Serostatus: CMV D+/R-  EBV D-/R+ Toxo D-/R-     #Hypomagnesium  Mg 1.3 today and symptoms of muscle cramping. Start 400 mg Mg Oxide today.     #NSVT, resolved  Postoperative, with no hemodynamic compromise.     #Leukocytosis, resolved  #C. Acnes growing from his former LVAD site  #Donor positive S Anginosus and Veillonella  Thought to be a contaminant, but treated with IV vanco --> po doxy through 6/1/20 (14d course total). Donor blood grew S anginosus and Veillonella (also thought to be a contaminant), but treated with Vanco/zosyn --> Vanco/Flagyl for a total of 7 days.      #Elevated PSA  Prostate MRI showed signs of BPH. He has been referred to urology.     #DM Type II   BS controlled on current regimen.       25 minutes spent face-to-face with patient, >50% in counseling and/or coordination of care as described above      Dinora Harper DNP, NP-C  6/30/2020            [facility] [address]

## 2020-06-30 NOTE — IP AVS SNAPSHOT
MRN:[id number]                      After Visit Summary   6/30/2020    Mariusz Sharp    MRN: [id number]           Visit Information        Department      6/30/2020  8:08 AM [address] [facility]          Review of your medicines      UNREVIEWED medicines. Ask your doctor about these medicines       Dose / Directions   acetaminophen 325 MG tablet  Commonly known as:  TYLENOL  Used for:  Status post heart transplantation (H)      Dose:  650 mg  Take 2 tablets (650 mg) by mouth every 4 hours as needed for other (multimodal surgical pain management along with NSAIDS and opioid medication as indicated based on pain control and physical function.)  Quantity:     Refills:  0     amLODIPine 5 MG tablet  Commonly known as:  NORVASC  Used for:  Other secondary hypertension      Dose:  10 mg  Take 2 tablets (10 mg) by mouth At Bedtime  Quantity:  90 tablet  Refills:  3     aspirin 81 MG chewable tablet  Commonly known as:  ASA  Used for:  LVAD (left ventricular assist device) present (H)      Dose:  81 mg  Take 1 tablet (81 mg) by mouth daily  Quantity:  90 tablet  Refills:  3     calcium carbonate 600 mg-vitamin D 400 units 600-400 MG-UNIT per tablet  Commonly known as:  CALTRATE  Used for:  Status post heart transplantation (H)      Dose:  1 tablet  Take 1 tablet by mouth 2 times daily (with meals)  Quantity:     Refills:  0     clotrimazole 10 MG allison  Used for:  Status post heart transplantation (H)      Dose:  10 mg  Take 1 Allison (10 mg) by mouth 4 times daily  Quantity:  360 Allison  Refills:  1     * insulin aspart 100 UNIT/ML pen  Commonly known as:  NovoLOG PEN  Used for:  Status post heart transplantation (H)      Correction Scale - HIGH INSULIN RESISTANCE DOSING     Do Not give Correction Insulin if Pre-Meal BG less than 140.   For Pre-Meal  - 164 give 1 unit.   For Pre-Meal  - 189 give 2 units.   For Pre-Meal  - 214 give 3 units.   For Pre-Meal  - 239 give 4 units.    For Pre-Meal  - 264 give 5 units.   For Pre-Meal  - 289 give 6 units.   For Pre-Meal  - 314 give 7 units.   For Pre-Meal  - 339 give 8 units.   For Pre-Meal  - 364 give 9 units.   For Pre-Meal BG greater than or equal to 365 give 10 units  To be given with prandial insulin, and based on pre-meal blood glucose.   Notify provider if glucose greater than or equal to 350 mg/dL after administration of correction dose. If given at mealtime, administer within 30 minutes of start of meal  Quantity:  3 mL  Refills:  0     * insulin aspart 100 UNIT/ML pen  Commonly known as:  NovoLOG PEN  Used for:  Status post heart transplantation (H)      Dose:  1-7 Units  Inject 1-7 Units Subcutaneous At Bedtime HIGH INSULIN RESISTANCE DOSING    Do Not give Bedtime Correction Insulin if BG less than 200.   For  - 224 give 1 units.   For  - 249 give 2 units.   For  - 274 give 3 units.   For  - 299 give 4 units.   For  - 324 give 5 units.   For  - 349 give 6 units.   For BG greater than or equal to 350 give 7 units.   Notify provider if glucose greater than or equal to 350 mg/dL after administration of correction dose. If given at mealtime, administer within 30 minutes of start of meal  Quantity:  3 mL  Refills:  0     * insulin aspart 100 UNIT/ML pen  Commonly known as:  NovoLOG PEN  Used for:  Status post heart transplantation (H)      1 unit / 4 gms CHO with meals,plus correction insulin. Pt uses approx 40 units in 24 hrs.  Quantity:  3 mL  Refills:  3     insulin glargine 100 UNIT/ML pen  Commonly known as:  LANTUS PEN  Used for:  Status post heart transplantation (H)      Inject 44 units subcutaneous daily.  Quantity:  3 mL  Refills:  0     mycophenolate 500 MG tablet  Commonly known as:  GENERIC EQUIVALENT  Used for:  Status post heart transplantation (H)      Dose:  1,500 mg  Take 3 tablets (1,500 mg) by mouth 2 times daily  Quantity:  180 tablet  Refills:  0      pantoprazole 40 MG EC tablet  Commonly known as:  PROTONIX  Used for:  Status post heart transplantation (H)      Dose:  40 mg  Take 1 tablet (40 mg) by mouth every morning (before breakfast)  Quantity:  30 tablet  Refills:  0     predniSONE 10 MG tablet  Commonly known as:  DELTASONE  Used for:  Status post heart transplantation (H)      Take 1.5 tablets (15 mg) by mouth every morning AND 1 tablet (10 mg) every evening.  Quantity:  120 tablet  Refills:  0     rosuvastatin 10 MG tablet  Commonly known as:  CRESTOR  Used for:  Status post heart transplantation (H)      Dose:  10 mg  Take 1 tablet (10 mg) by mouth daily  Quantity:  30 tablet  Refills:  0     * tacrolimus 0.5 MG capsule  Commonly known as:  GENERIC EQUIVALENT  Used for:  Status post heart transplantation (H)      Take one 0.5mg capsule WITH three 1mg capsules to equal 3.5mg every morning and evening.  Quantity:  120 capsule  Refills:  0     * tacrolimus 1 MG capsule  Commonly known as:  GENERIC EQUIVALENT  Used for:  Status post heart transplantation (H)      Take 3 capsules with one 0.5mg capule (3.5 mg) by mouth every morning AND 3 capsules  (3mg) every evening.  Quantity:  150 capsule  Refills:  1     valGANciclovir 450 MG tablet  Commonly known as:  VALCYTE  Indication:  Medication Treatment to Prevent Cytomegalovirus Disease  Used for:  Status post heart transplantation (H)      Dose:  450 mg  Take 1 tablet (450 mg) by mouth daily  Quantity:  30 tablet  Refills:  0         * This list has 5 medication(s) that are the same as other medications prescribed for you. Read the directions carefully, and ask your doctor or other care provider to review them with you.            CONTINUE these medicines which have NOT CHANGED       Dose / Directions   Accu-Chek Denise Plus test strip  Used for:  Status post heart transplantation (H)  Generic drug:  blood glucose      Check BG 3 times daily at meals and at bedtime.  Quantity:  300 strip  Refills:  0     BD  SILVANA U/F 32G X 4 MM miscellaneous  Used for:  Type 2 diabetes mellitus with hyperglycemia, with long-term current use of insulin (H)  Generic drug:  insulin pen needle      Use 3-4 daily.  Quantity:  250 each  Refills:  4              Protect others around you: Learn how to safely use, store and throw away your medicines at www.disposemymeds.org.       Follow-ups after your visit       Your next 10 appointments already scheduled    Jun 30, 2020  Procedure with Kit Bacon MD  [facility],  [facility] Lab ([facility], [facility]) [address] [address]  [phone]   The [facility] is located on the corner of [address] and [facility] [address] [facility] on the [facility]. It is easily accessible from virtually any point in the [facility] [place] [facility] area, via [address] and [address].   Jun 30, 2020  1:00 PM CDT  Pentamadine with  PFL JIMBO  [facility] Pulmonary Function Testing ([facility]) [address] [place] [address]  [address] [facility] [address]  [phone]      Jul 14, 2020  9:30 AM CDT  (Arrive by 9:15 AM)  RETURN HEART TRANSPLANT with Karen Benson NP  [facility] Heart Care ([facility]) [address] [place] [address]  [phone]      Jul 14, 2020 10:30 AM CDT  LAB with UU LAB GOLD WAITING  [facility], Lab ([facility], [facility]) [address] [facility] [address]   Please do not eat 10-12 hours before your appointment if you are coming in fasting for labs on lipids, cholesterol, or glucose (sugar). Does not apply to pregnant women. Water, tea and black coffee (with nothing added) is okay. Do not drink other fluids, diet soda or gum. If you have concerns about taking your medications, please send a message by clicking on Secure Messaging, Message Your Care Team.     Jul 14, 2020 11:00 AM  CDT  Procedure - 2.5 hour with U2A ROOM 7  [address] [facility] ([facility]) [address] [address]      Jul 14, 2020  Procedure with Cardiologist MD Maddie  [facility]  [facility] ([facility] [place]) [address] [facility] [address]  [phone]   The [facility] [place] is located on the corner of [facility] and [facility] on the [facility]. It is easily accessible from virtually any point in the [facility] area, via [address] and [address].   Jul 21, 2020  1:00 PM CDT  Telephone Visit with Jeannette Schafer PA-C  [facility] Endocrinology ([facility]) [address] [place] [id number] [phone]   [facility] Endocrinology  Note: this is not an onsite visit; there is no need to come to the facility.  Please have a list of all current medications available for appointment.      Jul 29, 2020 10:00 AM CDT  (Arrive by 9:45 AM)  RETURN HEART TRANSPLANT with Jenni Ortiz MD  [facility] Heart Care ([facility]) [address] [place] [id number] [phone]      Jul 29, 2020 11:00 AM CDT  LAB with UU LAB GOLD WAITING  [facility] Lab ([facility]) [address] [facility] [address]   Please do not eat 10-12 hours before your appointment if you are coming in fasting for labs on lipids, cholesterol, or glucose (sugar). Does not apply to pregnant women. Water, tea and black coffee (with nothing added) is okay. Do not drink other fluids, diet soda or gum. If you have concerns about taking your medications, please send a message by clicking on Secure Messaging, Message Your Care Team.     Jul 29, 2020 11:30 AM CDT  Procedure - 2.5 hour with U2A ROOM 9  [address] [facility] ([facility]  [facility], [facility]) [address] [facility] [address]         Care Instructions       Further instructions from your care team       [facility]                        Interventional Cardiology  Discharge Instructions   Post Right Heart Cath      AFTER YOU GO HOME:    DO drink plenty of fluids    DO resume your regular diet and medications unless otherwise instructed by your Primary Physician    Do Not scrub the procedure site vigorously    No lotion or powder to the puncture site for 3 days    CALL YOUR PRIMARY PHYSICIAN IF: You may resume all normal activity.  Monitor neck site for bleeding, swelling, or voice changes. If you notice bleeding or swelling immediately apply pressure to the site and call number below to speak with Cardiology Fellow.  If you experience any changes in your breathing you should call your doctor immediately or come to the closest Emergency Department.  Do not drive yourself.    ADDITIONAL INSTRUCTIONS: Medications: You are to resume all home medications including anticoagulation therapy unless otherwise advised by your primary cardiologist or nurse coordinator.    Follow Up: Per your primary cardiology team    If you have any questions or concerns regarding your procedure site please call [phone] at anytime and ask for Cardiology Fellow on call.  They are available 24 hours a day.  You may also contact the Cardiology Clinic after hours number at [phone].                                                       Telephone Numbers [phone] Monday-Friday 8:00 am to 4:30 pm    [phone] After 4:30 pm Monday-Friday, Weekends & Holidays  Ask for Interventional Cardiologist on call. Someone is on call 24 hours/day   [facility] toll free number [phone] Monday-Friday 8:00 am to 4:30 pm   [facility] Emergency Dept [phone]                   Additional Information About Your Visit       MyChart Information   "  [employer] gives you secure access to your electronic health record. If you see a primary care provider, you can also send messages to your care team and make appointments. If you have questions, please call your primary care clinic.  If you do not have a primary care provider, please call [phone] and they will assist you.       Care EveryWhere ID    This is your Care EveryWhere ID. This could be used by other organizations to access your [facility] medical records  [id number]       Your Vitals Were  Most recent update: 6/30/2020  9:10 AM    Blood Pressure   142/100   (BP Location: Left arm, Cuff Size: Adult Regular)          Temperature   97.9  F (36.6  C) (Oral)          Respirations   14          Height   1.626 m (5' 4\")          Weight   83.6 kg (184 lb 3.2 oz)             Pulse Oximetry   98%    BMI (Body Mass Index)   31.62 kg/m           Primary Care Provider Office Phone # Fax #    Milad Fry [phone]      Equal Access to Services    HUGO [facility] AH: Hadii aad ku hadasho Soomaali, waaxda luqadaha, qaybta kaalmada adeegyada, waxay idiin haytyronen debra bar . So [facility] [phone].    ATENCIÓN: Si habla español, tiene a waddell disposición servicios gratuitos de asistencia lingüística. [facility] [phone].    We comply with applicable federal and state civil rights laws, including the [place] Human Rights Act. We do not discriminate on the basis of race, color, creed, Islam, national origin, marital status, age, disability, sex, sexual orientation, or gender identity.       Thank you!    Thank you for choosing [facility] for your care. Our goal is always to provide you with excellent care. Hearing back from our patients is one way we can continue to improve our services. Please take a few minutes to complete the written survey that you may receive in the mail after you visit with us. Thank you!            Medication List      Medications          Morning Afternoon Evening " Bedtime As Needed    Accu-Chek Denise Plus test strip  INSTRUCTIONS:  Check BG 3 times daily at meals and at bedtime.  Generic drug:  blood glucose                     BD SILVANA U/F 32G X 4 MM miscellaneous  INSTRUCTIONS:  Use 3-4 daily.  Generic drug:  insulin pen needle                       ASK your doctor about these medications          Morning Afternoon Evening Bedtime As Needed    acetaminophen 325 MG tablet  Also known as:  TYLENOL  INSTRUCTIONS:  Take 2 tablets (650 mg) by mouth every 4 hours as needed for other (multimodal surgical pain management along with NSAIDS and opioid medication as indicated based on pain control and physical function.)                     amLODIPine 5 MG tablet  Also known as:  NORVASC  INSTRUCTIONS:  Take 2 tablets (10 mg) by mouth At Bedtime                     aspirin 81 MG chewable tablet  Also known as:  ASA  INSTRUCTIONS:  Take 1 tablet (81 mg) by mouth daily                     calcium carbonate 600 mg-vitamin D 400 units 600-400 MG-UNIT per tablet  Also known as:  CALTRATE  INSTRUCTIONS:  Take 1 tablet by mouth 2 times daily (with meals)                     clotrimazole 10 MG allison  INSTRUCTIONS:  Take 1 Allison (10 mg) by mouth 4 times daily                     * insulin aspart 100 UNIT/ML pen  Also known as:  NovoLOG PEN  INSTRUCTIONS:  Correction Scale - HIGH INSULIN RESISTANCE DOSING     Do Not give Correction Insulin if Pre-Meal BG less than 140.   For Pre-Meal  - 164 give 1 unit.   For Pre-Meal  - 189 give 2 units.   For Pre-Meal  - 214 give 3 units.   For Pre-Meal  - 239 give 4 units.   For Pre-Meal  - 264 give 5 units.   For Pre-Meal  - 289 give 6 units.   For Pre-Meal  - 314 give 7 units.   For Pre-Meal  - 339 give 8 units.   For Pre-Meal  - 364 give 9 units.   For Pre-Meal BG greater than or equal to 365 give 10 units  To be given with prandial insulin, and based on pre-meal blood glucose.   Notify provider if  glucose greater than or equal to 350 mg/dL after administration of correction dose. If given at mealtime, administer within 30 minutes of start of meal                     * insulin aspart 100 UNIT/ML pen  Also known as:  NovoLOG PEN  INSTRUCTIONS:  Inject 1-7 Units Subcutaneous At Bedtime HIGH INSULIN RESISTANCE DOSING    Do Not give Bedtime Correction Insulin if BG less than 200.   For  - 224 give 1 units.   For  - 249 give 2 units.   For  - 274 give 3 units.   For  - 299 give 4 units.   For  - 324 give 5 units.   For  - 349 give 6 units.   For BG greater than or equal to 350 give 7 units.   Notify provider if glucose greater than or equal to 350 mg/dL after administration of correction dose. If given at mealtime, administer within 30 minutes of start of meal                     * insulin aspart 100 UNIT/ML pen  Also known as:  NovoLOG PEN  INSTRUCTIONS:  1 unit / 4 gms CHO with meals,plus correction insulin. Pt uses approx 40 units in 24 hrs.                     insulin glargine 100 UNIT/ML pen  Also known as:  LANTUS PEN  INSTRUCTIONS:  Inject 44 units subcutaneous daily.  Doctor's comments:  If Lantus is not covered by insurance, may substitute Basaglar at same dose and frequency.                       mycophenolate 500 MG tablet  Also known as:  GENERIC EQUIVALENT  INSTRUCTIONS:  Take 3 tablets (1,500 mg) by mouth 2 times daily                     pantoprazole 40 MG EC tablet  Also known as:  PROTONIX  INSTRUCTIONS:  Take 1 tablet (40 mg) by mouth every morning (before breakfast)                     predniSONE 10 MG tablet  Also known as:  DELTASONE  INSTRUCTIONS:  Take 1.5 tablets (15 mg) by mouth every morning AND 1 tablet (10 mg) every evening.  Doctor's comments:  TXP DT 5/18/2020 (Heart) TXP Dischg DT 5/29/2020 DX Heart replaced by transplant Z94.1 [place] [facility] [place] ([place], [place])                     rosuvastatin 10 MG  tablet  Also known as:  CRESTOR  INSTRUCTIONS:  Take 1 tablet (10 mg) by mouth daily                     * tacrolimus 0.5 MG capsule  Also known as:  GENERIC EQUIVALENT  INSTRUCTIONS:  Take one 0.5mg capsule WITH three 1mg capsules to equal 3.5mg every morning and evening.                     * tacrolimus 1 MG capsule  Also known as:  GENERIC EQUIVALENT  INSTRUCTIONS:  Take 3 capsules with one 0.5mg capule (3.5 mg) by mouth every morning AND 3 capsules  (3mg) every evening.                     valGANciclovir 450 MG tablet  Also known as:  VALCYTE  INSTRUCTIONS:  Take 1 tablet (450 mg) by mouth daily  Reason for med:  Medication Treatment to Prevent Cytomegalovirus Disease                        * This list has 5 medication(s) that are the same as other medications prescribed for you. Read the directions carefully, and ask your doctor or other care provider to review them with you.

## 2020-06-30 NOTE — TELEPHONE ENCOUNTER
Patient Call: Voicemail  Date/Time: 6/30/20 at 9:38 am  Reason for call: patient needs a refill on his Prednisone no other details left

## 2020-06-30 NOTE — DISCHARGE INSTRUCTIONS
[facility]                        Interventional Cardiology  Discharge Instructions   Post Right Heart Cath      AFTER YOU GO HOME:    DO drink plenty of fluids    DO resume your regular diet and medications unless otherwise instructed by your Primary Physician    Do Not scrub the procedure site vigorously    No lotion or powder to the puncture site for 3 days    CALL YOUR PRIMARY PHYSICIAN IF: You may resume all normal activity.  Monitor neck site for bleeding, swelling, or voice changes. If you notice bleeding or swelling immediately apply pressure to the site and call number below to speak with Cardiology Fellow.  If you experience any changes in your breathing you should call your doctor immediately or come to the closest Emergency Department.  Do not drive yourself.    ADDITIONAL INSTRUCTIONS: Medications: You are to resume all home medications including anticoagulation therapy unless otherwise advised by your primary cardiologist or nurse coordinator.    Follow Up: Per your primary cardiology team    If you have any questions or concerns regarding your procedure site please call [phone] at anytime and ask for Cardiology Fellow on call.  They are available 24 hours a day.  You may also contact the Cardiology Clinic after hours number at [phone].                                                       Telephone Numbers [phone] Monday-Friday 8:00 am to 4:30 pm    [phone] After 4:30 pm Monday-Friday, Weekends & Holidays  Ask for Interventional Cardiologist on call. Someone is on call 24 hours/day   [facility] toll free number [phone] Monday-Friday 8:00 am to 4:30 pm   [facility] Emergency Dept [phone]

## 2020-06-30 NOTE — PROGRESS NOTES
[facility]   Interventional Cardiology        Consenting/Education for Cardiac Cath Lab Procedure: Right Heart Catheterization and Cardiac Biopsy     Patient understands we would like to perform .Right Heart Catheterization and Cardiac Biopsy. This procedure will be performed by Dr. Bacon.    The patient understands the following:     Right Heart Catheterization: A fine tube (catheter) is put into the vein of the groin/neck.  It is carefully passed along until it reaches the heart and then goes up into the blood vessels of the lungs. This is done to measure a variety of pressures in your heart and can tell us how well the heart is filling and emptying, as well as monitor fluid status.  and Endomyocardial Biopsy: A catheter is placed in your neck/groin vein, a 'Biopsy forceps' or pincers at the end of the catheter are used to take the tissue samples. This is may be repeated to get enough samples. The biopsy pieces are very small (one to two millimeters).     No sedation is planned for this procedure. Patient also understands risks and complications of the procedure which include, but are not limited to bruising/swelling around the incision site, infection, bleeding, allergic reaction to local anesthetic, air embolism, arterial puncture, stroke, heart attack.       Patient verbalized understanding of risks and benefits and has elected to proceed with the procedure or procedures listed above.    CARMEN Arenas, CNP  [facility] Cardiology

## 2020-06-30 NOTE — PROGRESS NOTES
Mariusz Sharp is receiving pentamidine neb treatment per Jenni Trammell.  Patient was first given 2.5 mg albuterol neb as additional treatment.  Patient was then given 300 mg NebuPent mixed with 6 ml sterile water.  No complications were noted during pentamidine neb treatment.  Procedure was performed by ILENE North.

## 2020-07-01 LAB — COPATH REPORT: NORMAL

## 2020-07-01 NOTE — TELEPHONE ENCOUNTER
Pt called, all labs reviewed from yesterday's visit with TRISTIAN Harper.  Pt was started on 400mg Magnesium for Mg of 1.3.  BP still elevated, so pt was started on 25mg of Hyrdalazine TID per TRISTIAN Harper.  Script sent to  specialty pharmacy.  Pt instructed to continue to take BP twice daily and to call if over 140/90 or under 100/60.  Tacrolimus level 11.3.  Goal 10-12. No dose change at this time. Recheck at next visit.  Pt states understanding plan of care and next steps.   DEPRESSION/ANXIETY

## 2020-07-02 ENCOUNTER — TELEPHONE (OUTPATIENT)
Dept: TRANSPLANT | Facility: CLINIC | Age: 60
End: 2020-07-02

## 2020-07-02 ENCOUNTER — PATIENT OUTREACH (OUTPATIENT)
Dept: EDUCATION SERVICES | Facility: CLINIC | Age: 60
End: 2020-07-02
Payer: COMMERCIAL

## 2020-07-02 DIAGNOSIS — Z79.4 TYPE 2 DIABETES MELLITUS WITH HYPERGLYCEMIA, WITH LONG-TERM CURRENT USE OF INSULIN (H): Primary | ICD-10-CM

## 2020-07-02 DIAGNOSIS — E11.65 TYPE 2 DIABETES MELLITUS WITH HYPERGLYCEMIA, WITH LONG-TERM CURRENT USE OF INSULIN (H): Primary | ICD-10-CM

## 2020-07-02 DIAGNOSIS — Z94.1 STATUS POST HEART TRANSPLANTATION (H): ICD-10-CM

## 2020-07-02 RX ORDER — PROCHLORPERAZINE 25 MG/1
1 SUPPOSITORY RECTAL
Qty: 9 EACH | Refills: 3 | Status: SHIPPED | OUTPATIENT
Start: 2020-07-02 | End: 2021-06-07

## 2020-07-02 RX ORDER — PROCHLORPERAZINE 25 MG/1
1 SUPPOSITORY RECTAL
Qty: 1 EACH | Refills: 3 | Status: SHIPPED | OUTPATIENT
Start: 2020-07-02 | End: 2021-06-07

## 2020-07-02 RX ORDER — PREDNISONE 10 MG/1
TABLET ORAL
Qty: 120 TABLET | Refills: 3 | Status: SHIPPED | OUTPATIENT
Start: 2020-07-02 | End: 2020-07-17

## 2020-07-02 RX ORDER — PROCHLORPERAZINE 25 MG/1
1 SUPPOSITORY RECTAL DAILY
Qty: 1 DEVICE | Refills: 1 | Status: SHIPPED | OUTPATIENT
Start: 2020-07-02 | End: 2021-06-07

## 2020-07-02 NOTE — PROGRESS NOTES
"Mariusz Sharp is a 59 year old male who is being evaluated via a billable telephone visit.      The patient has been notified of following:     \"This telephone visit will be conducted via a call between you and your physician/provider. We have found that certain health care needs can be provided without the need for a physical exam.  This service lets us provide the care you need with a short phone conversation.  If a prescription is necessary we can send it directly to your pharmacy.  If lab work is needed we can place an order for that and you can then stop by our lab to have the test done at a later time.    Telephone visits are billed at different rates depending on your insurance coverage. During this emergency period, for some insurers they may be billed the same as an in-person visit.  Please reach out to your insurance provider with any questions.    If during the course of the call the physician/provider feels a telephone visit is not appropriate, you will not be charged for this service.\"    Patient has given verbal consent for Telephone visit?  yes    What phone number would you like to be contacted at? mobile    How would you like to obtain your AVS? Priscila    Phone call duration: 30 minutes    DIABETES EDUCATION NOTE:    Mariusz Sharp presents today for education related to type 2 diabetes.   Patient is being treated with:  Intensive insulin program  He is accompanied by self    PATIENT CONCERNS RELATED TO DIABETES SELF MANAGEMENT:   Avoiding hypoglycemia    ASSESSMENT:  Current Diabetes Management per Patient:  Taking diabetes medications? Insulin Novolog and Lantus    Monitoring  Patient glucose self monitoring as follows:   BG meter: Uknown meter  BG results: fastin-285  Prelunch: 129-285  Dinner: 109-181     BG values are: in and out of goal  Patient's most recent   Lab Results   Component Value Date    A1C 7.8 06/15/2020    is not  meeting goal of <7.0%    Exercise: minimal " exercise    Nutrition:   Patient feels confident with carb counting and is looking up foods he is not sure about, has measured food    Hypoglycemia:   Patient is at risk of hypoglycemia? yes           EDUCATION and INSTRUCTION PROVIDED AT THIS VISIT:     Red had an episode of hypoglycemia down to 46 while hospitalized which he could not feel.  That has left him fearful and he is withholding insulin at times.  He tells me he would rather wake up high than not wake up.  He likes the idea of using a Dexcom and he feels like he would be bolder with his insulin dosing if he had the cgm.    He is currently taking Lantus at bedtime but holds the dose if he is under 120.  He will then wake up high and require more correction the following day.      We discussed the four glucose sensors currently on the market:  Osiris Flash, DexCom, Guardian Connect and Eversense.  We covered how they work, the difference between a sensor reading and a blood glucose reading, the usefulness in documenting blood sugar trends and adjusting insulin accordingly, alerts and alarms, how they are inserted and removed, calibration requirements, length of wear, the times you need to remove the sensor (for X-rays, CT and MRI scans)  and the process for ordering and starting one.     Patient-stated goal written and given to Mariusz Sharp.  Verbalized and demonstrated understanding of instructions.     PLAN:  Orders sent to [facility] for DexCom G 6  See patient instructions  AVS printed and given to patient    FOLLOW-UP:    August 4 at 12:30 for DexCom start  Time spent with patient at today's visit was 40 minutes.      Any diabetes medication dose changes were made via the CDE Protocol and Collaborative Practice Agreement with [place] and  [facility].  A copy of this encounter was provided to patient's referring provider.

## 2020-07-02 NOTE — TELEPHONE ENCOUNTER
Pt called with negative heart biopsy results.  Pt to decrease Prednisone dose to 10mg BID.  Pt is home in [place] now, and states things are going well.  Reviewed with pt how to reach  Specialty pharmacy for refills, and when to call coordinator.  Per pt, he will restart Cardiac Rehab at [facility]-  rehab is arranging.  Pt states understanding all instructions and plan of care.

## 2020-07-03 ENCOUNTER — TELEPHONE (OUTPATIENT)
Dept: PHARMACY | Facility: CLINIC | Age: 60
End: 2020-07-03

## 2020-07-03 DIAGNOSIS — Z94.1 STATUS POST HEART TRANSPLANTATION (H): ICD-10-CM

## 2020-07-03 RX ORDER — ROSUVASTATIN CALCIUM 20 MG/1
20 TABLET, COATED ORAL AT BEDTIME
Qty: 90 TABLET | Refills: 0 | OUTPATIENT
Start: 2020-07-03

## 2020-07-03 NOTE — TELEPHONE ENCOUNTER
Clinical Pharmacy Consult:                                                      Transplant Specific: 1 Month Post Transplant Pharmacy Call  Date of Transplant: 5/18/2020  Type of Transplant: heart  First Transplant: yes  History of rejection: no    Immunosuppression Regimen   TAC 3.5mg qAM & 3mg qPM, Prednisone 10mg qAM & 10mg qPM and MMF 1500mg qAM & 1500mg qPM  Patient specific goal: 10-12  Most recent level: 11.3, date 6/30/2020  Immunosuppressant Levels:  Therapeutic  Pt adherent to lab draws: yes  Scr:   Lab Results   Component Value Date    CR 1.75 06/30/2020     Side effects: Tremors, muscle cramping    Prophylactic Medications  Antibacterial:  Inhaled Pentamidine 300mg q 4 weeks  Scheduled Discontinue Date: 6 months    Antifungal: Clotrimazole   Scheduled Discontinue Date: until off steroid    Antiviral: CrCl 40 to 59 mL/minute: Valcyte 450 mg once daily   Scheduled Discontinue Date: 6 months    Acid Reducer: Protonix (pantoprazole)  Scheduled Reviewed Date: until off steroid    Thrombosis Prevention: Aspirin 81 mg PO daily      Blood Pressure Management  Frequency of home Blood Pressure checks: twice daily  Most recent home BP: 131/92  Patient Blood pressure goal: <140/90  Patient blood pressure at goal:  yes  Hospitalizations/ER visits since last assessment: 0  Medication adherence flowsheet 7/3/2020   Patient medication administration: Responsible for own medications   Patient estimated adherence level: %   Pharmacist assessment of adherence: Excellent   Patient reported doses missed per week: 0-1   Pharmacy MPR: -   Facilitators to medication adherence  Alarm;Cell phone;Medication dosing chart;Pill box   Patient reported barriers to medication adherence  No issues identified   Adherence intervention(s): -      Medication access flowsheet 7/3/2020   Number of pharmacies used: 1   Pharmacy: [facility]   Enrolled in [facility]? Yes   Patient reported barriers to accessing  medications: No issues reported by patient   Medication access interventions: No issues identified        Med rec/DUR performed: yes  Med Rec Discrepancies: no    Red reports feeling really good. He states he has noticed some shaking that is affecting his handwriting and some muscle cramps. Confirmed he is taking his magnesium 2 hours apart from his mycophenolate mofetil. He sets up his own med box and uses alarms on his phone, reports no missed doses.     Blood pressure: controlled, checks twice daily    No concerns at this time, will follow up in 1 month.    Chinyere Tavares, LucretiaD

## 2020-07-06 DIAGNOSIS — Z11.59 ENCOUNTER FOR SCREENING FOR OTHER VIRAL DISEASES: Primary | ICD-10-CM

## 2020-07-06 RX ORDER — MYCOPHENOLATE MOFETIL 500 MG/1
TABLET ORAL
Qty: 180 TABLET | Refills: 0 | Status: SHIPPED | OUTPATIENT
Start: 2020-07-06 | End: 2020-08-03

## 2020-07-09 ENCOUNTER — PRE VISIT (OUTPATIENT)
Dept: TRANSPLANT | Facility: CLINIC | Age: 60
End: 2020-07-09

## 2020-07-09 DIAGNOSIS — Z94.1 STATUS POST HEART TRANSPLANTATION (H): Primary | ICD-10-CM

## 2020-07-09 RX ORDER — LIDOCAINE 40 MG/G
CREAM TOPICAL
Status: CANCELLED | OUTPATIENT
Start: 2020-07-09

## 2020-07-10 ENCOUNTER — TELEPHONE (OUTPATIENT)
Dept: TRANSPLANT | Facility: CLINIC | Age: 60
End: 2020-07-10

## 2020-07-10 NOTE — TELEPHONE ENCOUNTER
Called pt to check BP since starting Hydralazine.  Pt states BP has been anywhere from 120/80 to 140/90.  Pt instructed to bring his BP log to upcoming cath lab appointment next week to discuss with MD.  Pt instructed to call if BP is greater than 140/90 for two readings.  Pt states understanding.

## 2020-07-13 ENCOUNTER — TELEPHONE (OUTPATIENT)
Dept: CARDIOLOGY | Facility: CLINIC | Age: 60
End: 2020-07-13

## 2020-07-13 NOTE — TELEPHONE ENCOUNTER
Call complete for pre procedure reminder, travel screen and updated visitor policy.  COVID test not needed d/t recent heart tx

## 2020-07-14 ENCOUNTER — APPOINTMENT (OUTPATIENT)
Dept: MEDSURG UNIT | Facility: CLINIC | Age: 60
End: 2020-07-14
Attending: NURSE PRACTITIONER
Payer: COMMERCIAL

## 2020-07-14 ENCOUNTER — HOSPITAL ENCOUNTER (OUTPATIENT)
Facility: CLINIC | Age: 60
Discharge: HOME | End: 2020-07-14
Attending: INTERNAL MEDICINE | Admitting: INTERNAL MEDICINE
Payer: COMMERCIAL

## 2020-07-14 ENCOUNTER — ALLIED HEALTH/NURSE VISIT (OUTPATIENT)
Dept: EDUCATION SERVICES | Facility: CLINIC | Age: 60
End: 2020-07-14
Payer: COMMERCIAL

## 2020-07-14 VITALS
SYSTOLIC BLOOD PRESSURE: 146 MMHG | RESPIRATION RATE: 22 BRPM | DIASTOLIC BLOOD PRESSURE: 92 MMHG | OXYGEN SATURATION: 96 %

## 2020-07-14 DIAGNOSIS — E11.65 TYPE 2 DIABETES MELLITUS WITH HYPERGLYCEMIA, WITH LONG-TERM CURRENT USE OF INSULIN (H): Primary | ICD-10-CM

## 2020-07-14 DIAGNOSIS — Z94.1 HEART REPLACED BY TRANSPLANT (H): ICD-10-CM

## 2020-07-14 DIAGNOSIS — Z79.4 TYPE 2 DIABETES MELLITUS WITH HYPERGLYCEMIA, WITH LONG-TERM CURRENT USE OF INSULIN (H): Primary | ICD-10-CM

## 2020-07-14 DIAGNOSIS — Z94.1 STATUS POST HEART TRANSPLANTATION (H): ICD-10-CM

## 2020-07-14 LAB
ALBUMIN SERPL-MCNC: 3.5 G/DL (ref 3.4–5)
ALP SERPL-CCNC: 38 U/L (ref 40–150)
ALT SERPL W P-5'-P-CCNC: 38 U/L (ref 0–70)
ANION GAP SERPL CALCULATED.3IONS-SCNC: 9 MMOL/L (ref 3–14)
AST SERPL W P-5'-P-CCNC: 26 U/L (ref 0–45)
BILIRUB SERPL-MCNC: 0.6 MG/DL (ref 0.2–1.3)
BUN SERPL-MCNC: 61 MG/DL (ref 7–30)
CALCIUM SERPL-MCNC: 8.6 MG/DL (ref 8.5–10.1)
CHLORIDE SERPL-SCNC: 107 MMOL/L (ref 94–109)
CO2 SERPL-SCNC: 21 MMOL/L (ref 20–32)
CREAT SERPL-MCNC: 1.86 MG/DL (ref 0.66–1.25)
ERYTHROCYTE [DISTWIDTH] IN BLOOD BY AUTOMATED COUNT: 18.9 % (ref 10–15)
GFR SERPL CREATININE-BSD FRML MDRD: 39 ML/MIN/{1.73_M2}
GLUCOSE BLDC GLUCOMTR-MCNC: 189 MG/DL (ref 70–99)
GLUCOSE SERPL-MCNC: 147 MG/DL (ref 70–99)
HCT VFR BLD AUTO: 27.2 % (ref 40–53)
HGB BLD-MCNC: 8.4 G/DL (ref 13.3–17.7)
MAGNESIUM SERPL-MCNC: 1.4 MG/DL (ref 1.6–2.3)
MCH RBC QN AUTO: 26.9 PG (ref 26.5–33)
MCHC RBC AUTO-ENTMCNC: 30.9 G/DL (ref 31.5–36.5)
MCV RBC AUTO: 87 FL (ref 78–100)
PHOSPHATE SERPL-MCNC: 2.8 MG/DL (ref 2.5–4.5)
PLATELET # BLD AUTO: 195 10E9/L (ref 150–450)
POTASSIUM SERPL-SCNC: 4 MMOL/L (ref 3.4–5.3)
PROT SERPL-MCNC: 6.2 G/DL (ref 6.8–8.8)
RBC # BLD AUTO: 3.12 10E12/L (ref 4.4–5.9)
SODIUM SERPL-SCNC: 137 MMOL/L (ref 133–144)
TACROLIMUS BLD-MCNC: 6.5 UG/L (ref 5–15)
TME LAST DOSE: NORMAL H
WBC # BLD AUTO: 4.1 10E9/L (ref 4–11)

## 2020-07-14 PROCEDURE — 40000166 ZZH STATISTIC PP CARE STAGE 1

## 2020-07-14 PROCEDURE — 25000125 ZZHC RX 250: Performed by: INTERNAL MEDICINE

## 2020-07-14 PROCEDURE — 80053 COMPREHEN METABOLIC PANEL: CPT | Performed by: INTERNAL MEDICINE

## 2020-07-14 PROCEDURE — 93505 ENDOMYOCARDIAL BIOPSY: CPT | Performed by: INTERNAL MEDICINE

## 2020-07-14 PROCEDURE — 84100 ASSAY OF PHOSPHORUS: CPT | Performed by: INTERNAL MEDICINE

## 2020-07-14 PROCEDURE — 27210794 ZZH OR GENERAL SUPPLY STERILE: Performed by: INTERNAL MEDICINE

## 2020-07-14 PROCEDURE — 25000128 H RX IP 250 OP 636: Performed by: PHYSICIAN ASSISTANT

## 2020-07-14 PROCEDURE — 82962 GLUCOSE BLOOD TEST: CPT

## 2020-07-14 PROCEDURE — 93505 ENDOMYOCARDIAL BIOPSY: CPT | Mod: 26 | Performed by: INTERNAL MEDICINE

## 2020-07-14 PROCEDURE — 88346 IMFLUOR 1ST 1ANTB STAIN PX: CPT | Performed by: INTERNAL MEDICINE

## 2020-07-14 PROCEDURE — 88307 TISSUE EXAM BY PATHOLOGIST: CPT | Performed by: INTERNAL MEDICINE

## 2020-07-14 PROCEDURE — 80197 ASSAY OF TACROLIMUS: CPT | Performed by: INTERNAL MEDICINE

## 2020-07-14 PROCEDURE — 88350 IMFLUOR EA ADDL 1ANTB STN PX: CPT | Performed by: INTERNAL MEDICINE

## 2020-07-14 PROCEDURE — 85027 COMPLETE CBC AUTOMATED: CPT | Performed by: INTERNAL MEDICINE

## 2020-07-14 PROCEDURE — 83735 ASSAY OF MAGNESIUM: CPT | Performed by: INTERNAL MEDICINE

## 2020-07-14 PROCEDURE — C1894 INTRO/SHEATH, NON-LASER: HCPCS | Performed by: INTERNAL MEDICINE

## 2020-07-14 PROCEDURE — 99214 OFFICE O/P EST MOD 30 MIN: CPT | Performed by: NURSE PRACTITIONER

## 2020-07-14 PROCEDURE — 36415 COLL VENOUS BLD VENIPUNCTURE: CPT | Performed by: INTERNAL MEDICINE

## 2020-07-14 RX ORDER — MAGNESIUM SULFATE HEPTAHYDRATE 40 MG/ML
4 INJECTION, SOLUTION INTRAVENOUS EVERY 4 HOURS PRN
Status: DISCONTINUED | OUTPATIENT
Start: 2020-07-14 | End: 2020-07-14 | Stop reason: HOSPADM

## 2020-07-14 RX ORDER — MAGNESIUM SULFATE HEPTAHYDRATE 40 MG/ML
2 INJECTION, SOLUTION INTRAVENOUS DAILY PRN
Status: DISCONTINUED | OUTPATIENT
Start: 2020-07-14 | End: 2020-07-14 | Stop reason: HOSPADM

## 2020-07-14 RX ORDER — LIDOCAINE 40 MG/G
CREAM TOPICAL
Status: COMPLETED | OUTPATIENT
Start: 2020-07-14 | End: 2020-07-14

## 2020-07-14 RX ADMIN — MAGNESIUM SULFATE IN WATER 4 G: 40 INJECTION, SOLUTION INTRAVENOUS at 12:24

## 2020-07-14 RX ADMIN — LIDOCAINE: 40 CREAM TOPICAL at 11:13

## 2020-07-14 NOTE — PROGRESS NOTES
[facility]   Interventional Cardiology        Consenting/Education for Right Heart Catheterization/Endomyocardial Biopsy    Patient understands during the portion for right heart catheterization a fine tube (catheter) is put into the vein of the groin/neck.  It is carefully passed along until it reaches the heart and then goes up into the blood vessels of the lungs. This is done to measure a variety of pressures in your heart and can tell us how well the heart is filling and emptying, as well as monitor fluid status. While the catheter is in your neck/groin vein, a  Biopsy forceps  or pincers at the end of the catheter are used to take the tissue samples. This is may be repeated to get enough samples. The biopsy pieces are very small (one to two millimeters).     Patient also understands risks and complications of the procedure which include, but are not limited to bruising/swelling around the incision site, infection, bleeding, allergic reaction to local anesthetic, air embolism, arterial puncture, stroke, heart attack.      Patient verbalized understanding of risks and benefits of the  right heart catheterization, and endomyocardial biopsy and has elected to proceed with the procedure.       Hugo Gutierrez PA-C  [facility] Cardiology Team

## 2020-07-14 NOTE — DISCHARGE INSTRUCTIONS
[facility]                        Interventional Cardiology  Discharge Instructions   Post Right Heart Cath      AFTER YOU GO HOME:    DO drink plenty of fluids    DO resume your regular diet and medications unless otherwise instructed by your Primary Physician    Do Not scrub the procedure site vigorously    No lotion or powder to the puncture site for 3 days    CALL YOUR PRIMARY PHYSICIAN IF: You may resume all normal activity.  Monitor neck site for bleeding, swelling, or voice changes. If you notice bleeding or swelling immediately apply pressure to the site and call number below to speak with Cardiology Fellow.  If you experience any changes in your breathing you should call your doctor immediately or come to the closest Emergency Department.  Do not drive yourself.    ADDITIONAL INSTRUCTIONS: Medications: You are to resume all home medications including anticoagulation therapy unless otherwise advised by your primary cardiologist or nurse coordinator.    Follow Up: Per your primary cardiology team    If you have any questions or concerns regarding your procedure site please call [phone] at anytime and ask for Cardiology Fellow on call.  They are available 24 hours a day.  You may also contact the Cardiology Clinic after hours number at [phone].                                                       Telephone Numbers [phone] Monday-Friday 8:00 am to 4:30 pm    [phone] After 4:30 pm Monday-Friday, Weekends & Holidays  Ask for Interventional Cardiologist on call. Someone is on call 24 hours/day   [facility] toll free number [phone] Monday-Friday 8:00 am to 4:30 pm   [facility] Emergency Dept [phone]

## 2020-07-14 NOTE — IP AVS SNAPSHOT
MRN:[id number]                      After Visit Summary   7/14/2020    Mariusz Sharp    MRN: [id number]           Visit Information        Department      7/14/2020 10:14 AM [address] [facility] [place]          Review of your medicines      UNREVIEWED medicines. Ask your doctor about these medicines       Dose / Directions   acetaminophen 325 MG tablet  Commonly known as:  TYLENOL  Used for:  Status post heart transplantation (H)      Dose:  650 mg  Take 2 tablets (650 mg) by mouth every 4 hours as needed for other (multimodal surgical pain management along with NSAIDS and opioid medication as indicated based on pain control and physical function.)  Quantity:     Refills:  0     amLODIPine 5 MG tablet  Commonly known as:  NORVASC  Used for:  Other secondary hypertension      Dose:  10 mg  Take 2 tablets (10 mg) by mouth At Bedtime  Quantity:  90 tablet  Refills:  3     aspirin 81 MG chewable tablet  Commonly known as:  ASA  Used for:  LVAD (left ventricular assist device) present (H)      Dose:  81 mg  Take 1 tablet (81 mg) by mouth daily  Quantity:  90 tablet  Refills:  3     calcium carbonate 600 mg-vitamin D 400 units 600-400 MG-UNIT per tablet  Commonly known as:  CALTRATE  Used for:  Status post heart transplantation (H)      Dose:  1 tablet  Take 1 tablet by mouth 2 times daily (with meals)  Quantity:     Refills:  0     clotrimazole 10 MG allison  Used for:  Status post heart transplantation (H)      Dose:  10 mg  Take 1 Allison (10 mg) by mouth 4 times daily  Quantity:  360 Allison  Refills:  1     hydrALAZINE 25 MG tablet  Commonly known as:  APRESOLINE  Used for:  Heart replaced by transplant (H)      Dose:  25 mg  Take 1 tablet (25 mg) by mouth 3 times daily  Quantity:  270 tablet  Refills:  3     * insulin aspart 100 UNIT/ML pen  Commonly known as:  NovoLOG PEN  Used for:  Status post heart transplantation (H)      Correction Scale - HIGH INSULIN RESISTANCE DOSING     Do Not give Correction  Insulin if Pre-Meal BG less than 140.   For Pre-Meal  - 164 give 1 unit.   For Pre-Meal  - 189 give 2 units.   For Pre-Meal  - 214 give 3 units.   For Pre-Meal  - 239 give 4 units.   For Pre-Meal  - 264 give 5 units.   For Pre-Meal  - 289 give 6 units.   For Pre-Meal  - 314 give 7 units.   For Pre-Meal  - 339 give 8 units.   For Pre-Meal  - 364 give 9 units.   For Pre-Meal BG greater than or equal to 365 give 10 units  To be given with prandial insulin, and based on pre-meal blood glucose.   Notify provider if glucose greater than or equal to 350 mg/dL after administration of correction dose. If given at mealtime, administer within 30 minutes of start of meal  Quantity:  3 mL  Refills:  0     * insulin aspart 100 UNIT/ML pen  Commonly known as:  NovoLOG PEN  Used for:  Status post heart transplantation (H)      Dose:  1-7 Units  Inject 1-7 Units Subcutaneous At Bedtime HIGH INSULIN RESISTANCE DOSING    Do Not give Bedtime Correction Insulin if BG less than 200.   For  - 224 give 1 units.   For  - 249 give 2 units.   For  - 274 give 3 units.   For  - 299 give 4 units.   For  - 324 give 5 units.   For  - 349 give 6 units.   For BG greater than or equal to 350 give 7 units.   Notify provider if glucose greater than or equal to 350 mg/dL after administration of correction dose. If given at mealtime, administer within 30 minutes of start of meal  Quantity:  3 mL  Refills:  0     * insulin aspart 100 UNIT/ML pen  Commonly known as:  NovoLOG PEN  Used for:  Status post heart transplantation (H)      1 unit / 4 gms CHO with meals,plus correction insulin. Pt uses approx 40 units in 24 hrs.  Quantity:  3 mL  Refills:  3     insulin glargine 100 UNIT/ML pen  Commonly known as:  LANTUS PEN  Used for:  Status post heart transplantation (H)      Inject 44 units subcutaneous daily.  Quantity:  15 mL  Refills:  3     magnesium oxide 400 (241.3  Mg) MG tablet  Commonly known as:  MAG-OX  Used for:  Hypomagnesemia      Dose:  400 mg  Take 1 tablet (400 mg) by mouth daily  Quantity:  90 tablet  Refills:  3     mycophenolate 500 MG tablet  Commonly known as:  GENERIC EQUIVALENT  Used for:  Status post heart transplantation (H)      TAKE THREE TABLETS BY MOUTH TWICE A DAY  Quantity:  180 tablet  Refills:  0     pantoprazole 40 MG EC tablet  Commonly known as:  PROTONIX  Used for:  Status post heart transplantation (H)      Dose:  40 mg  Take 1 tablet (40 mg) by mouth every morning (before breakfast)  Quantity:  30 tablet  Refills:  0     predniSONE 10 MG tablet  Commonly known as:  DELTASONE  Used for:  Status post heart transplantation (H)      Take 1 tablet (10 mg) by mouth every morning AND 1 tablet (10 mg) every evening.  Quantity:  120 tablet  Refills:  3     rosuvastatin 10 MG tablet  Commonly known as:  CRESTOR  Used for:  Status post heart transplantation (H)      Dose:  10 mg  Take 1 tablet (10 mg) by mouth daily  Quantity:  30 tablet  Refills:  0     * tacrolimus 0.5 MG capsule  Commonly known as:  GENERIC EQUIVALENT  Used for:  Status post heart transplantation (H)      Take one 0.5mg capsule WITH three 1mg capsules to equal 3.5mg every morning and evening.  Quantity:  120 capsule  Refills:  0     * tacrolimus 1 MG capsule  Commonly known as:  GENERIC EQUIVALENT  Used for:  Status post heart transplantation (H)      Take 3 capsules with one 0.5mg capule (3.5 mg) by mouth every morning AND 3 capsules  (3mg) every evening.  Quantity:  150 capsule  Refills:  1     valGANciclovir 450 MG tablet  Commonly known as:  VALCYTE  Indication:  Medication Treatment to Prevent Cytomegalovirus Disease  Used for:  Status post heart transplantation (H)      Dose:  450 mg  Take 1 tablet (450 mg) by mouth daily  Quantity:  30 tablet  Refills:  0         * This list has 5 medication(s) that are the same as other medications prescribed for you. Read the directions  carefully, and ask your doctor or other care provider to review them with you.            CONTINUE these medicines which have NOT CHANGED       Dose / Directions   Accu-Chek Denise Plus test strip  Used for:  Status post heart transplantation (H)  Generic drug:  blood glucose      Check BG 3 times daily at meals and at bedtime.  Quantity:  300 strip  Refills:  0     BD SILVANA U/F 32G X 4 MM miscellaneous  Used for:  Type 2 diabetes mellitus with hyperglycemia, with long-term current use of insulin (H)  Generic drug:  insulin pen needle      Use 3-4 daily.  Quantity:  250 each  Refills:  4     Dexcom G6  Monie  Used for:  Type 2 diabetes mellitus with hyperglycemia, with long-term current use of insulin (H)      Dose:  1 each  1 each daily  Quantity:  1 Device  Refills:  1     Dexcom G6 Sensor Misc  Used for:  Type 2 diabetes mellitus with hyperglycemia, with long-term current use of insulin (H)      Dose:  1 each  1 each every 10 days  Quantity:  9 each  Refills:  3     Dexcom G6 Transmitter Misc  Used for:  Type 2 diabetes mellitus with hyperglycemia, with long-term current use of insulin (H)      Dose:  1 each  1 each every 3 months  Quantity:  1 each  Refills:  3              Protect others around you: Learn how to safely use, store and throw away your medicines at www.disposemymeds.org.       Follow-ups after your visit       Your next 10 appointments already scheduled    Jul 14, 2020  Procedure with Brian Long MD  [facility], [facility],  Heart Cath Lab ([facility], [facility]) [address] [address] [phone]   The [facility] is located on the corner of [address] and [address] [facility] on the [facility]. It is easily accessible from virtually any point in the [facility] area, via [address] and [address].   Jul 21, 2020  1:00 PM CDT  Telephone Visit with Jeannette Schafer PA-C  [facility] Endocrinology (  [facility] [place]) [address]  [address] [facility] [id number] [phone]   [facility] Endocrinology  Note: this is not an onsite visit; there is no need to come to the facility.  Please have a list of all current medications available for appointment.      Jul 29, 2020  9:30 AM CDT  LAB with  LAB   Health Lab ([facility]) [address] [place] [address]  [address]st [facility] [id number] [phone]   Located in the Clinics and Surgery Center at [address] [address]. We're taking every precaution to prevent the spread of COVID-19. Our top priority is to protect and care for our patients, community members, and our staff. For that reason, we are suspending  services until further notice. Please self-park your vehicle before entering our facility. For [facility] [place] please use the [address] at [address] [place] [address], [place] [phone].        Jul 29, 2020 10:00 AM CDT  (Arrive by 9:45 AM)  RETURN HEART TRANSPLANT with Jenni Ortiz MD  [facility] Heart Care ([facility]) [address] [place] [id number] [phone]      Jul 29, 2020 11:30 AM CDT  Procedure - 2.5 hour with U2A ROOM 9  Unit [address]A [facility] ([facility]) [address] [facility] [address]      Jul 29, 2020  Procedure with Cardiologist MD Maddie  [facility],  Heart Cath Lab ([facility]) [address] [address]  [phone]   The [facility] is located on the corner of [facility] [facility] [address] and [facility] [facility] on the [facility]. It is easily accessible from virtually any point in the [facility] area, via [address] and [address].   Aug 04, 2020 12:30 PM CDT  (Arrive by 12:15 PM)  Video Visit with Gabby Cortez RN  [facility] Diabetes   [facility] [place]) [address]  3rd Floor  [facility] [id number] [phone]   [facility] Diabetes  Note: this is not an onsite visit; there is no need to come to the facility.  Please have a list of all current medications available for appointment.      Aug 07, 2020 10:00 AM CDT  Telephone Visit with Megan Ibarra MD  [facility]rology and Inst for Prostate and Urologic Cancers ([facility]) [address]  4th [facility] [id number] [phone]   [facility] for Prostate and Urologic Cancers  Note: this is not an onsite visit; there is no need to come to the facility.  Please have a list of all current medications available for appointment.      Aug 13, 2020  8:30 AM CDT  LAB with UU LAB GOLD WAITING  [facility] [place], [facility] ([facility], [facility]) [address] [facility] [address]   Please do not eat 10-12 hours before your appointment if you are coming in fasting for labs on lipids, cholesterol, or glucose (sugar). Does not apply to pregnant women. Water, tea and black coffee (with nothing added) is okay. Do not drink other fluids, diet soda or gum. If you have concerns about taking your medications, please send a message by clicking on Secure Messaging, Message Your Care Team.     Aug 13, 2020  9:00 AM CDT  Procedure - 2.5 hour with U2A ROOM 2  Unit 2A [facility] ([facility], [facility]) [address] [facility] [address]         Care Instructions       Further instructions from your care team       [facility]                        Interventional Cardiology  Discharge Instructions   Post Right Heart Cath      AFTER YOU GO HOME:    DO drink plenty of fluids    DO resume your regular diet and medications unless otherwise instructed by your Primary Physician    Do Not scrub the procedure  site vigorously    No lotion or powder to the puncture site for 3 days    CALL YOUR PRIMARY PHYSICIAN IF: You may resume all normal activity.  Monitor neck site for bleeding, swelling, or voice changes. If you notice bleeding or swelling immediately apply pressure to the site and call number below to speak with Cardiology Fellow.  If you experience any changes in your breathing you should call your doctor immediately or come to the closest Emergency Department.  Do not drive yourself.    ADDITIONAL INSTRUCTIONS: Medications: You are to resume all home medications including anticoagulation therapy unless otherwise advised by your primary cardiologist or nurse coordinator.    Follow Up: Per your primary cardiology team    If you have any questions or concerns regarding your procedure site please call [phone] at anytime and ask for Cardiology Fellow on call.  They are available 24 hours a day.  You may also contact the Cardiology Clinic after hours number at [phone].                                                       Telephone Numbers [phone] Monday-Friday 8:00 am to 4:30 pm    [phone] After 4:30 pm Monday-Friday, Weekends & Holidays  Ask for Interventional Cardiologist on call. Someone is on call 24 hours/day   [facility] toll free number [phone] Monday-Friday 8:00 am to 4:30 pm   [facility] Emergency Dept [phone]                   Additional Information About Your Visit       [employer] Information    [employer] gives you secure access to your electronic health record. If you see a primary care provider, you can also send messages to your care team and make appointments. If you have questions, please call your primary care clinic.  If you do not have a primary care provider, please call [phone] and they will assist you.       Care EveryWhere ID    This is your Care EveryWhere ID. This could be used by other organizations to access your [place] medical records  [id number]        Your Vitals Were  Most recent update: 7/14/2020 11:12 AM    Blood Pressure   129/97   (BP Location: Right arm)    Respirations   1      Pulse Oximetry   97%            Primary Care Provider Office Phone # Fax #    Milad Fry [phone]      Equal Access to Services    [facility]: Hadii aad ku hadasho Soomaali, waaxda luqadaha, qaybta kaalmada adeegyada, waxay lynnettein hayaan adegalileo gordon ladebbien . So [facility] [phone].    ATENCIÓN: Si habla español, tiene a waddell disposición servicios gratuitos de asistencia lingüística. Trent al [phone].    We comply with applicable federal and state civil rights laws, including the [place] Human Rights Act. We do not discriminate on the basis of race, color, creed, Baptist, national origin, marital status, age, disability, sex, sexual orientation, or gender identity.       Thank you!    Thank you for choosing [facility] for your care. Our goal is always to provide you with excellent care. Hearing back from our patients is one way we can continue to improve our services. Please take a few minutes to complete the written survey that you may receive in the mail after you visit with us. Thank you!            Medication List      Medications          Morning Afternoon Evening Bedtime As Needed    Accu-Chek Denise Plus test strip  INSTRUCTIONS:  Check BG 3 times daily at meals and at bedtime.  Generic drug:  blood glucose                     BD SILVANA U/F 32G X 4 MM miscellaneous  INSTRUCTIONS:  Use 3-4 daily.  Generic drug:  insulin pen needle                     Dexcom G6  Monie  INSTRUCTIONS:  1 each daily                     Dexcom G6 Sensor Misc  INSTRUCTIONS:  1 each every 10 days                     Dexcom G6 Transmitter Misc  INSTRUCTIONS:  1 each every 3 months                       ASK your doctor about these medications          Morning Afternoon Evening Bedtime As Needed    acetaminophen 325 MG tablet  Also known as:  TYLENOL  INSTRUCTIONS:   Take 2 tablets (650 mg) by mouth every 4 hours as needed for other (multimodal surgical pain management along with NSAIDS and opioid medication as indicated based on pain control and physical function.)                     amLODIPine 5 MG tablet  Also known as:  NORVASC  INSTRUCTIONS:  Take 2 tablets (10 mg) by mouth At Bedtime                     aspirin 81 MG chewable tablet  Also known as:  ASA  INSTRUCTIONS:  Take 1 tablet (81 mg) by mouth daily                     calcium carbonate 600 mg-vitamin D 400 units 600-400 MG-UNIT per tablet  Also known as:  CALTRATE  INSTRUCTIONS:  Take 1 tablet by mouth 2 times daily (with meals)                     clotrimazole 10 MG allison  INSTRUCTIONS:  Take 1 Allison (10 mg) by mouth 4 times daily                     hydrALAZINE 25 MG tablet  Also known as:  APRESOLINE  INSTRUCTIONS:  Take 1 tablet (25 mg) by mouth 3 times daily                     * insulin aspart 100 UNIT/ML pen  Also known as:  NovoLOG PEN  INSTRUCTIONS:  Correction Scale - HIGH INSULIN RESISTANCE DOSING     Do Not give Correction Insulin if Pre-Meal BG less than 140.   For Pre-Meal  - 164 give 1 unit.   For Pre-Meal  - 189 give 2 units.   For Pre-Meal  - 214 give 3 units.   For Pre-Meal  - 239 give 4 units.   For Pre-Meal  - 264 give 5 units.   For Pre-Meal  - 289 give 6 units.   For Pre-Meal  - 314 give 7 units.   For Pre-Meal  - 339 give 8 units.   For Pre-Meal  - 364 give 9 units.   For Pre-Meal BG greater than or equal to 365 give 10 units  To be given with prandial insulin, and based on pre-meal blood glucose.   Notify provider if glucose greater than or equal to 350 mg/dL after administration of correction dose. If given at mealtime, administer within 30 minutes of start of meal                     * insulin aspart 100 UNIT/ML pen  Also known as:  NovoLOG PEN  INSTRUCTIONS:  Inject 1-7 Units Subcutaneous At Bedtime HIGH INSULIN RESISTANCE DOSING     Do Not give Bedtime Correction Insulin if BG less than 200.   For  - 224 give 1 units.   For  - 249 give 2 units.   For  - 274 give 3 units.   For  - 299 give 4 units.   For  - 324 give 5 units.   For  - 349 give 6 units.   For BG greater than or equal to 350 give 7 units.   Notify provider if glucose greater than or equal to 350 mg/dL after administration of correction dose. If given at mealtime, administer within 30 minutes of start of meal                     * insulin aspart 100 UNIT/ML pen  Also known as:  NovoLOG PEN  INSTRUCTIONS:  1 unit / 4 gms CHO with meals,plus correction insulin. Pt uses approx 40 units in 24 hrs.                     insulin glargine 100 UNIT/ML pen  Also known as:  LANTUS PEN  INSTRUCTIONS:  Inject 44 units subcutaneous daily.  Doctor's comments:  If Lantus is not covered by insurance, may substitute Basaglar at same dose and frequency.                       magnesium oxide 400 (241.3 Mg) MG tablet  Also known as:  MAG-OX  INSTRUCTIONS:  Take 1 tablet (400 mg) by mouth daily                     mycophenolate 500 MG tablet  Also known as:  GENERIC EQUIVALENT  INSTRUCTIONS:  TAKE THREE TABLETS BY MOUTH TWICE A DAY                     pantoprazole 40 MG EC tablet  Also known as:  PROTONIX  INSTRUCTIONS:  Take 1 tablet (40 mg) by mouth every morning (before breakfast)                     predniSONE 10 MG tablet  Also known as:  DELTASONE  INSTRUCTIONS:  Take 1 tablet (10 mg) by mouth every morning AND 1 tablet (10 mg) every evening.  Doctor's comments:  TXP DT 5/18/2020 (Heart) TXP Dischg DT 5/29/2020 DX Heart replaced by transplant Z94.1 TX [facility] ([place], [place])                     rosuvastatin 10 MG tablet  Also known as:  CRESTOR  INSTRUCTIONS:  Take 1 tablet (10 mg) by mouth daily                     * tacrolimus 0.5 MG capsule  Also known as:  GENERIC EQUIVALENT  INSTRUCTIONS:  Take one 0.5mg  capsule WITH three 1mg capsules to equal 3.5mg every morning and evening.                     * tacrolimus 1 MG capsule  Also known as:  GENERIC EQUIVALENT  INSTRUCTIONS:  Take 3 capsules with one 0.5mg capule (3.5 mg) by mouth every morning AND 3 capsules  (3mg) every evening.                     valGANciclovir 450 MG tablet  Also known as:  VALCYTE  INSTRUCTIONS:  Take 1 tablet (450 mg) by mouth daily  Reason for med:  Medication Treatment to Prevent Cytomegalovirus Disease                        * This list has 5 medication(s) that are the same as other medications prescribed for you. Read the directions carefully, and ask your doctor or other care provider to review them with you.

## 2020-07-14 NOTE — PROGRESS NOTES
DIABETES EDUCATION NOTE:    Mariusz Sharp presents today for education related to Type 2 diabetes.  Patient is being treated with:  insulin and SMBG  He is accompanied by self    PATIENT CONCERNS RELATED TO DIABETES SELF MANAGEMENT:   Getting set up on a DexCom, has hypoglycemia unawareness    ASSESSMENT:  Type 2 diabetes, a1c above target    Current Diabetes Management per Patient:  Taking diabetes medications?   yes:     Diabetes Medication(s)     Insulin       insulin aspart (NOVOLOG PEN) 100 UNIT/ML pen    1 unit / 4 gms CHO with meals,plus correction insulin. Pt uses approx 40 units in 24 hrs.     insulin aspart (NOVOLOG PEN) 100 UNIT/ML pen    Correction Scale - HIGH INSULIN RESISTANCE DOSING     Do Not give Correction Insulin if Pre-Meal BG less than 140.   For Pre-Meal  - 164 give 1 unit.   For Pre-Meal  - 189 give 2 units.   For Pre-Meal  - 214 give 3 units.   For Pre-Meal  - 239 give 4 units.   For Pre-Meal  - 264 give 5 units.   For Pre-Meal  - 289 give 6 units.   For Pre-Meal  - 314 give 7 units.   For Pre-Meal  - 339 give 8 units.   For Pre-Meal  - 364 give 9 units.   For Pre-Meal BG greater than or equal to 365 give 10 units  To be given with prandial insulin, and based on pre-meal blood glucose.   Notify provider if glucose greater than or equal to 350 mg/dL after administration of correction dose. If given at mealtime, administer within 30 minutes of start of meal     insulin aspart (NOVOLOG PEN) 100 UNIT/ML pen    Inject 1-7 Units Subcutaneous At Bedtime HIGH INSULIN RESISTANCE DOSING    Do Not give Bedtime Correction Insulin if BG less than 200.   For  - 224 give 1 units.   For  - 249 give 2 units.   For  - 274 give 3 units.   For  - 299 give 4 units.   For  - 324 give 5 units.   For  - 349 give 6 units.   For BG greater than or equal to 350 give 7 units.   Notify provider if glucose greater than or equal to  350 mg/dL after administration of correction dose. If given at mealtime, administer within 30 minutes of start of meal     insulin glargine (LANTUS PEN) 100 UNIT/ML pen    Inject 44 units subcutaneous daily.        Monitoring  Patient glucose self monitoring as follows: four times daily.   BG meter: Uknown meter  BG results:not available    BG values are: Not in goal  Patient's most recent   Lab Results   Component Value Date    A1C 7.8 06/15/2020    is not meeting goal of <7.0         EDUCATION and INSTRUCTION PROVIDED AT THIS VISIT:    Patient was instructed in the following skills:    Explanation of difference between blood glucose and sensor glucose.  Insertion of sensor, technique, angle, site rotation, skin prep use, frequency of change.  Programming settings:  Hi and low alerts, rate alerts, predictive alerts,  out-of-range alerts and fixed low alert of 55 mg/mL.  Patient was guided in putting settings into   Basic Care of transmitter and :      CGM SETTINGS:    Settings:                 Hi Glucose alert:       mg/mL       Hi Glucose Repeat     ON  1         hour  Low Glucose alert:  ON 70  mg/mL     Low Glucose Repeat ON 30  minutes         Fall Rate                ON 3   mg/mL/mn   Rise Rate                  OFF     Mariusz's phone is not compatible with the DexCom Clarity alivia so he cannot stream data to clinic portal.  He does not have a computer or internet at home either.  He is coming back to clinic in two weeks and will upload  at that time.     Patient-stated goal written and given to Mariusz Sharp.  Verbalized and demonstrated understanding of instructions.     PLAN:  See patient instructions  AVS printed and given to patient    FOLLOW-UP:    July 29, 2:30 pm    Time spent with patient at today's visit was 60 minutes.      Any diabetes medication dose changes were made via the CDE Protocol and Collaborative Practice Agreement with [place] [facility].  A copy of  this encounter was provided to patient's referring provider.

## 2020-07-14 NOTE — PROGRESS NOTES
ADULT HEART TRANSPLANT     HPI:   Mr. Sharp is a 59 year old male who presents to 2A today after biopsy for routine heart transplant follow-up. 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 First biopsy 5/24 was negative for significant rejection. RHC 5/24 RA 7, mPA 24, PCW 15, and CI 2.6.  TTE 5/23 showed stable graft function, LVEF 65-70% and normal RV size/function. Post-operative course was c/b NSVT (with no hemodynamic compromise), leukocytosis with C Acnes growing from his former LVAD site (thought to be a contaminant, but treated with IV vanco --> po doxy through 6/1/20, 14d course total), and in the setting of donor blood growing S anginosus and Veillonella (also thought to be a contaminant, but treated with Vanco/zosyn -> Vanco/Flagyl for a total of 7 days). He also had hyperkalemia, which improved following kayexalate and stopping bactrim. He discharged 5/29 to [facility]. He was seen last in . I started hydralazine at that time of HTN and Mg supplements. Last RHC showed high wedge (20) and RA 9, normal cardiac outputs. TTE 6/15 showed normal graft function. He has no DSAs and biopsies have been negative for rejection.     Since last clinic visit patient reports feeling okay.  He has stayed busy helping his dad clean out his apartment which mostly involves packing boxes.  He denies any deep cleaning, sweeping, dusting.  His only concern is he has had 2 blood sugars in the 40s for which she was symptomatic.  He is nervous for the low blood sugar so he has been intermittently skipping Lantus.  He was seen by diabetic educator this morning and given a DexCom.  Blood pressures have improved since starting hydralazine.  As low as 100s over 70s but mostly 120s over 80s or 90s.  He notes mild leg edema worse at the end of the  "day.  Denies orthopnea, PND, abdominal bloating. Patient denies fever, chills, night sweats, oral lesions, rashes, lightheadedness, dizziness, headaches, chest pain, palpitations, nausea, vomiting, diarrhea.  He does continue to experience occasional \"charley horse \"cramping in his calves.    PAST MEDICAL HISTORY:  Past Medical History:   Diagnosis Date     Acute kidney injury (H)      CKD (chronic kidney disease)      Coronary artery disease      Diabetes mellitus (H)      HTN (hypertension)      Hyperlipidemia      Ischemic cardiomyopathy      LV (left ventricular) mural thrombus      Paroxysmal atrial flutter (H)      RVF (right ventricular failure) (H)      Systolic heart failure (H)      Ventricular tachycardia (H)        FAMILY HISTORY:  No family history on file.    SOCIAL HISTORY:  Social History     Socioeconomic History     Marital status: Single     Spouse name: Not on file     Number of children: Not on file     Years of education: Not on file     Highest education level: Not on file   Occupational History     Not on file   Social Needs     Financial resource strain: Not on file     Food insecurity     Worry: Not on file     Inability: Not on file     Transportation needs     Medical: Not on file     Non-medical: Not on file   Tobacco Use     Smoking status: Never Smoker     Smokeless tobacco: Never Used   Substance and Sexual Activity     Alcohol use: No     Frequency: Never     Drug use: No     Sexual activity: Not on file   Lifestyle     Physical activity     Days per week: Not on file     Minutes per session: Not on file     Stress: Not on file   Relationships     Social connections     Talks on phone: Not on file     Gets together: Not on file     Attends Spiritism service: Not on file     Active member of club or organization: Not on file     Attends meetings of clubs or organizations: Not on file     Relationship status: Not on file     Intimate partner violence     Fear of current or ex partner: " Not on file     Emotionally abused: Not on file     Physically abused: Not on file     Forced sexual activity: Not on file   Other Topics Concern     Not on file   Social History Narrative     Not on file       CURRENT MEDICATIONS:  No current facility-administered medications on file prior to encounter.   acetaminophen (TYLENOL) 325 MG tablet, Take 2 tablets (650 mg) by mouth every 4 hours as needed for other (multimodal surgical pain management along with NSAIDS and opioid medication as indicated based on pain control and physical function.)  aspirin (ASA) 81 MG chewable tablet, Take 1 tablet (81 mg) by mouth daily  calcium carbonate 600 mg-vitamin D 400 units (CALTRATE) 600-400 MG-UNIT per tablet, Take 1 tablet by mouth 2 times daily (with meals)  insulin aspart (NOVOLOG PEN) 100 UNIT/ML pen, Correction Scale - HIGH INSULIN RESISTANCE DOSING     Do Not give Correction Insulin if Pre-Meal BG less than 140.   For Pre-Meal  - 164 give 1 unit.   For Pre-Meal  - 189 give 2 units.   For Pre-Meal  - 214 give 3 units.   For Pre-Meal  - 239 give 4 units.   For Pre-Meal  - 264 give 5 units.   For Pre-Meal  - 289 give 6 units.   For Pre-Meal  - 314 give 7 units.   For Pre-Meal  - 339 give 8 units.   For Pre-Meal  - 364 give 9 units.   For Pre-Meal BG greater than or equal to 365 give 10 units  To be given with prandial insulin, and based on pre-meal blood glucose.   Notify provider if glucose greater than or equal to 350 mg/dL after administration of correction dose. If given at mealtime, administer within 30 minutes of start of meal  insulin aspart (NOVOLOG PEN) 100 UNIT/ML pen, Inject 1-7 Units Subcutaneous At Bedtime HIGH INSULIN RESISTANCE DOSING    Do Not give Bedtime Correction Insulin if BG less than 200.   For  - 224 give 1 units.   For  - 249 give 2 units.   For  - 274 give 3 units.   For  - 299 give 4 units.   For  - 324 give 5  units.   For  - 349 give 6 units.   For BG greater than or equal to 350 give 7 units.   Notify provider if glucose greater than or equal to 350 mg/dL after administration of correction dose. If given at mealtime, administer within 30 minutes of start of meal        ROS:  See HPI    EXAM:  /80 (BP Location: Right arm)   Resp 18   SpO2 98%     GENERAL: Appears comfortable, in no acute distress.   HEENT: Eye symmetrical, no discharge or icterus bilaterally. Mucous membranes moist and without lesions. No thrush.   CV: RRR, +S1S2, no murmur, rub, or gallop. JVP 3cm above clavicle at 90 degrees.   RESPIRATORY: Respirations regular, even, and unlabored. Lungs CTA throughout.   GI: Soft, obese and non distended with normoactive bowel sounds present in all quadrants. No tenderness, rebound, guarding. No hepatomegaly.   EXTREMITIES: 1+ BLE peripheral edema to knees. 2+ bilateral pedal pulses.   NEUROLOGIC: Alert and oriented x 3. No focal deficits.   MUSCULOSKELETAL: No joint swelling or tenderness.   SKIN: No jaundice. No rashes or lesions.     Labs - reviewed with patient in clinic today:  CBC RESULTS:  Lab Results   Component Value Date    WBC 5.7 06/30/2020    RBC 3.19 (L) 06/30/2020    HGB 8.4 (L) 06/30/2020    HCT 27.8 (L) 06/30/2020    MCV 87 06/30/2020    MCH 26.3 (L) 06/30/2020    MCHC 30.2 (L) 06/30/2020    RDW 18.4 (H) 06/30/2020     06/30/2020       CMP RESULTS:  Lab Results   Component Value Date     06/30/2020    POTASSIUM 4.9 06/30/2020    CHLORIDE 110 (H) 06/30/2020    CO2 21 06/30/2020    ANIONGAP 8 06/30/2020     (H) 06/30/2020    BUN 76 (H) 06/30/2020    CR 1.75 (H) 06/30/2020    GFRESTIMATED 42 (L) 06/30/2020    GFRESTBLACK 49 (L) 06/30/2020    ARLENE 9.0 06/30/2020    BILITOTAL 0.4 06/30/2020    ALBUMIN 3.3 (L) 06/30/2020    ALKPHOS 43 06/30/2020    ALT 51 06/30/2020    AST 24 06/30/2020        INR RESULTS:  Lab Results   Component Value Date    INR 1.16 (H) 06/04/2020        LIPID RESULTS:  Lab Results   Component Value Date    CHOL 183 06/15/2020    HDL 86 06/15/2020    LDL 82 06/15/2020    TRIG 73 06/15/2020       IMMUNOSUPPRESSANT LEVELS:  Lab Results   Component Value Date    TACROL 11.3 06/30/2020    DOSTAC Not Provided 06/30/2020       No components found for: CK  Lab Results   Component Value Date    MAG 1.3 (L) 06/30/2020     Lab Results   Component Value Date    A1C 7.8 (H) 06/15/2020     Lab Results   Component Value Date    PHOS 3.5 06/30/2020     Lab Results   Component Value Date    NTBNP 404 (H) 06/26/2019     Lab Results   Component Value Date    SAITESTMET [facility] 06/22/2020    SAICELL Class I 06/22/2020    WS3DQDCLM Cw:15 06/22/2020    EJ6QSPQKJX Cw:18 06/22/2020    SAIREPCOM  06/22/2020      Test performed by modified procedure. Serum heat inactivated and tested   by a modified ([place]) protocol including fetal calf serum addition.   High-risk, mfi >3,000. Mod-risk, mfi 500-3,000.       Lab Results   Component Value Date    SAIITESTME [facility] 06/22/2020    SAIICELL Class II 06/22/2020    JO1SLSIHD None 06/22/2020    [id number] None 06/22/2020    SAIIREPCOM  06/22/2020      Test performed by modified procedure. Serum heat inactivated and tested   by a modified ([place]) protocol including fetal calf serum addition.   High-risk, mfi >3,000. Mod-risk, mfi 500-3,000.       Lab Results   Component Value Date    CSPEC EDTA PLASMA 06/15/2020       Diagnostic Studies:  TTE 6/15/20  Global and regional left ventricular function is normal with an EF of 60-65%.  Right ventricular function, chamber size, wall motion, and thickness are normal.  The inferior vena cava is normal.  No pericardial effusion is present.    RHC 6/30/20  RA 10/12/9  RV 35/9  PA 35/20/27  PCWP 26/26/20  AMRIT 6.3/3.3  TD 7.2/3.8  PVR 0.97 (TD)  SVR 1333    Assessment/Plan:  Mr. Sharp is a 59 year old male who is s/p orthotopic heart transplant on 5/18/20 who presents to for routine follow-up. Prior history  of CAD (s/p STEMI with ALYSSA to LAD 6/27/18), ICM (LVEF 20-25%) s/p HM3 LVAD (12/31/18), monomorphic VT (s/p ICD with shocks x4 7/16/18), LV thrombus, CKD, elevated PSA, pAF, HTN, HL, and DMII.  Postop course complicated by NSVT, leukocytosis, and C acnes growing from former LVAD site treated with 14 days of antibiotics. Donor blood growing S anginosus and Veillonella (also thought to be a contaminant, but treated with Vanco/zosyn --> Vanco/Flagyl for total 7 days). He also had hyperkalemia, which improved following kayexalate and stopping Bactrim.  His graft function remains normal by echocardiogram. Last right heart cath showed RA 9, PCWP 20, normal outputs. RHC today pending.  He does appear slightly fluid up today so I discussed with him that he might need a short course of diuretic.  He does not have DSA's or history of rejection. BPs improved on hydralazine.  Will ensure he has appointment with pharmacist this week to help him titrate insulin.  He is also scheduled with endocrinologist next week.  We will increase his magnesium to twice daily for ongoing low values    # Status post OHT on 5/18/20, history of ICM  # Chronic immunosuppression  * Rejection history: none.   * Recent immunosuppression changes: none  * Last biopsy: 6/30 NER  * Intolerance to medications: none     Graft function:  - Fluid status: RHC pending, appears fluid up today, will dose diuretic if needed  - BP: at goal  - HRs: > 80     Immunosuppression:  - Received Simulect 5/18 and 5/22  - MMF 1500mg bid  - tacrolimus goal level 10-12, pending today  - prednisone per taper, currently 10 mg bid     PPx:  - CAV: aspirin 81mg daily, crestor 10mg daily  - PCP: Bactrim stopped 5/27 due to hyperkalemia, pentamidine given 6/30, due 7/28  - CMV: Valcyte 450mg daily (renally dosed), x6 months (through 11/2020)  - Thrush: Nystatin QID  - Osteoporosis: calcium/vitamin D   - GI: pantoprazole 40mg daily     Serostatus: CMV D+/R-  EBV D-/R+ Toxo  D-/R-     #Hypertension  At goal on amlodipine 10 mg daily and hydralazine 25 mg tid. Continue to monitor as outpatient.     #Hypomagnesium  Mg 1.4 today, increase to 400 mg bid supplement.     #DM Type II   #Symptomatic hypoglycemia  BS 40-220s recently. Seen by diabetic educator this AM and given DexCom to help manage. Will have patient discuss insulin dosing with pharmD this week, also scheduled with Dr Schafer on 7/21,      #post-op NSVT, resolved    #Leukocytosis, resolved  #C. Acnes growing from his former LVAD site  #Donor positive S Anginosus and Veillonella  Thought to be a contaminant, but treated with IV vanco --> po doxy through 6/1/20 (14d course total). Donor blood grew S anginosus and Veillonella (also thought to be a contaminant), but treated with Vanco/zosyn --> Vanco/Flagyl for a total of 7 days.      #Elevated PSA  Prostate MRI showed signs of BPH. He has been referred to urology.             25 minutes spent face-to-face with patient, >50% in counseling and/or coordination of care as described above      Dinora Harper, LEX, NP-C  7/14/2020          [facility]

## 2020-07-14 NOTE — PATIENT INSTRUCTIONS
1.  Start using the DexCom sensor.  In two hours it will start streaming glucose data.    2.  You can use that glucose data to dose your insulin but if you are questioning the number, double check with a glucose meter.  Realize the numbers will not be exactly the same.  Always trust your meter first!    3.  In 10 days you will get notification that your sensor session is ending in 6 hours, 2 hours, 30 minutes and then a change sensor now alert.  Peel the sensor off like a Band-Aid and take the transmitter out and clean it.  You use that for three months.    4. Hit stop sensor in the menu of the .      4.  Insert a new sensor and put transmitter in.  You will have another two hour warm-up period.    Gabby Cortez RN,CDE  [address] [address]  Phone: [phone]  [email or web address]

## 2020-07-14 NOTE — IP AVS SNAPSHOT
[address] [address]                                    After Visit Summary   7/14/2020    Mariusz Sharp    MRN: [id number]           After Visit Summary Signature Page    I have received my discharge instructions, and my questions have been answered. I have discussed any challenges I see with this plan with the nurse or doctor.    ..........................................................................................................................................  Patient/Patient Representative Signature      ..........................................................................................................................................  Patient Representative Print Name and Relationship to Patient    ..................................................               ................................................  Date                                   Time    ..........................................................................................................................................  Reviewed by Signature/Title    ...................................................              ..............................................  Date                                               Time          22EPIC Rev 08/18

## 2020-07-15 ENCOUNTER — TELEPHONE (OUTPATIENT)
Dept: TRANSPLANT | Facility: CLINIC | Age: 60
End: 2020-07-15

## 2020-07-15 ENCOUNTER — ALLIED HEALTH/NURSE VISIT (OUTPATIENT)
Dept: PHARMACY | Facility: CLINIC | Age: 60
End: 2020-07-15

## 2020-07-15 DIAGNOSIS — R73.9 HYPERGLYCEMIA: ICD-10-CM

## 2020-07-15 DIAGNOSIS — Z94.1 HEART REPLACED BY TRANSPLANT (H): Primary | ICD-10-CM

## 2020-07-15 DIAGNOSIS — K21.9 GASTROESOPHAGEAL REFLUX DISEASE, ESOPHAGITIS PRESENCE NOT SPECIFIED: ICD-10-CM

## 2020-07-15 DIAGNOSIS — E78.5 DYSLIPIDEMIA: ICD-10-CM

## 2020-07-15 DIAGNOSIS — Z94.1 STATUS POST HEART TRANSPLANTATION (H): Primary | ICD-10-CM

## 2020-07-15 DIAGNOSIS — I10 HYPERTENSION, UNSPECIFIED TYPE: ICD-10-CM

## 2020-07-15 DIAGNOSIS — E83.42 HYPOMAGNESEMIA: ICD-10-CM

## 2020-07-15 PROCEDURE — 99607 MTMS BY PHARM ADDL 15 MIN: CPT | Performed by: PHARMACIST

## 2020-07-15 PROCEDURE — 99606 MTMS BY PHARM EST 15 MIN: CPT | Performed by: PHARMACIST

## 2020-07-15 RX ORDER — VALGANCICLOVIR 450 MG/1
TABLET, FILM COATED ORAL
Qty: 30 TABLET | Refills: 3 | Status: SHIPPED | OUTPATIENT
Start: 2020-07-15 | End: 2020-10-29

## 2020-07-15 NOTE — TELEPHONE ENCOUNTER
Pt called and all lab work reviewed from yesterday's visit reviewed.  Pt's Magnesium increased to twice daily per Dinora Harper.  Tacrolimus level was a 14 hour trough.  Pt will have this rechecked this week.  Orders sent to pt's clinic in [place].  CXR also ordered per Dinora Harper as pt has had some occasional RADER, but denies any today.  Pt will get CXR when he get labs.  Pt states understanding all instructions and plan of care.

## 2020-07-15 NOTE — TELEPHONE ENCOUNTER
Patient Call: Voicemail  Date/Time: 7/15/2020 at 11:59  Reason for call: Patient LM indicating that he can have labs drawn this Friday, has rehab at 2pm.  Would like labs at [facility] on Friday.

## 2020-07-15 NOTE — PROGRESS NOTES
1920  Returned to  from [facility] per ambulation, S/P right heart cath.  Site at [facility] is FDI.  No hematoma noted.  Denies any pain.  Pt declines any food or liquid at this time.  States that he just wants to get home after such a long day of waiting.  1925  Discharge instructions reviewed with pt.  Verbally demonstrates understanding of instructions.  Encouraged to slow down getting dressed as he is becoming SOB with this activity.  1935  Discharge to care of self.  Ambulated patient to front of hospital.  No IV sedation for this procedure.  CTRN

## 2020-07-15 NOTE — LETTER
[facility] [phone] (Phone)  [phone] (Fax)       July 15, 2020    Patient Name: Mariusz Sharp   :  1960   MRN: [id number]  ICD10:  z94.1 , z79.899    Subject: Request for Labs    Mariusz Sharp is a heart transplant patient currently being followed by the [facility].  We would like to request your assistance in obtaining the following laboratory tests which are part of our routine surveillance program for heart transplant recipients.  (Items needed are checked.)    Please fax the lab results as soon as they are available to:   Thoracic Transplant Department  Fax Number:  [phone]     Item Frequency   X Basic metabolic panel (including:  BUN,   Serum Creatinine, Sodium, Potassium, Chloride,   CO2, Calcium, Magnesium, Phosphorus, and   Glucose) Weekly in 2020 and as needed with med changes.   X  Tacrolimus/Prograf level  (Should be mailed to the [facility] in the  provided to you by the patient.) Weekly in 2020 and as needed with med changes.     Thank you  for your continued support and the opportunity to collaborate in the care of this patient.  If you have any questions, please call the Thoracic Transplant Team at [phone] or [phone].    .

## 2020-07-15 NOTE — PROGRESS NOTES
MTM ENCOUNTER  SUBJECTIVE/OBJECTIVE:                           Mariusz Sharp is a 59 year old male called for a follow-up visit. He was referred to me from txp team post transplant. Today's visit is a follow-up MTM visit from 6/2/20.     Patient consented to a telehealth visit: yes  Telemedicine Visit Details  Type of service:  Telephone visit  Start Time: 2:00 PM  End Time: 2:29 PM  Originating Location (pt. Location): Home  Distant Location (provider location):  [facility] MEDICATION THERAPY MANAGEMENT  Mode of Communication:  Telephone    Chief Complaint: 2 months post txp.    Allergies/ADRs: Reviewed in EHR  Tobacco:  reports that he has never smoked. He has never used smokeless tobacco.  Alcohol: not currently using  PMH: Reviewed in EHR    Medication Adherence/Access: no issues reported. Missed a dose of calcium, hydralazine, amlodipine, Rosuvastatin yesterday because he dropped them on the floor at the hospital yesterday. Has not missed antirejection doses.     Heart Transplant:  Current immunosuppressants include TAC 3.5mg BID and MMF 1500mg BID, Prednisone 10mg BID.  Pt reports tremors, cramps early morning  Transplant date: 5/18/20  Estimated Creatinine Clearance: 42.8 mL/min (A) (based on SCr of 1.86 mg/dL (H)).  CMV prophylaxis: CrCl 40 to 59 mL/minute: Valcyte 450 mg once daily Donor (+), Recipient (-), treat 6 months post tx   PCP prophylaxis: Inhaled Pentamidine 300mg q 4 weeks, 6/1/20, stopped bactrim due to hyperkalemia.   Antifungal Prophylaxis: Clotrimazole QID  PPI use: Pantoprazole 40mg daily. Denies GERD sx.   Current supplements for electrolyte replacement: Calcium/D BID, Magnesium 400mg once daily  Tx Coordinator: Angelika Muller, Using Med Card: Yes  Immunizations: annual flu shot [id number] season; Ifserruxht20:  unknown; Prevnar 13: 2019; TDaP:  2019; Shingrix: unknown    GERD: Current medications include: Protonix (pantoprazole) 40mg once daily. Pt c/o no current symptoms.  Patient feels  that current regimen is effective.    Hyperglycemia:  Pt currently taking Novolog 1 unit per 4 grams of carbs and sliding scale, Lantus 44 units once daily. Pt is not experiencing side effects. Saw endocrine yesterday.   SMBG: Dexcom 6 implanted yesterday. Ranges (patient reported):    this morning.   Symptoms of low blood sugar? None, hypoglycemia unawareness.  Symptoms of high blood sugar? More urination, may be  From prostate.     Hypertension: Current medications include Hydralazine 25mg TID.  Patient does self-monitor BP. 131/89, 106/76, 144/91, 127/92, 134/92.  Patient reports no current medication side effects.  BP Readings from Last 3 Encounters:   07/14/20 (!) 146/92   06/30/20 (!) 160/104   06/15/20 (!) 160/108     Hyperlipidemia: Current therapy includes Rosuvastatin 10mg once daily.  Pt reports cramps, may be due to low magnesum  The ASCVD Risk score (Karan DOUGLAS Jr., et al., 2013) failed to calculate for the following reasons:    The patient has a prior MI or stroke diagnosis  Recent Labs   Lab Test 06/15/20  0826 08/06/18  1025   CHOL 183 126   HDL 86 52   LDL 82 24   TRIG 73 246*       Today's Vitals: There were no vitals taken for this visit.      ASSESSMENT:                            Medication Adherence: good, no issues identified    Heart Transplant:  Magnesium level fairly low, may be contributing to cramping. Looks like this was further increased to BID today due to low level, informed patient to change this.     GERD: Stable.     Hyperglycemia:  Stable.    Hypertension: Stable.     Hyperlipidemia: Stable.       PLAN:                          Post Discharge Medication Reconciliation Status: discharge medications reconciled and changed, per note/orders (see AVS).    1. Increase magnesium up to twice daily.      I spent 30 minutes with this patient today. I offer these suggestions for consideration by txp team. A copy of the visit note was provided to the patient's referring provider.    Will  follow up in 2 months.    The patient was sent via [employer] a summary of these recommendations.     Xavi Mckeon PharmD  [facility] Pharmacist    Phone: [phone]

## 2020-07-15 NOTE — PATIENT INSTRUCTIONS
Recommendations from today's MTM visit:                                                      1. Increase Magnesium up to 1 tablet twice daily. This may help with your cramps.    It was great to speak with you today.  I value your experience and would be very thankful for your time with providing feedback on our clinic survey. You may receive a survey via email or text message in the next few days.     Next MTM visit: 9/30/20    To schedule another MTM appointment, please call the clinic directly or you may call the MTM scheduling line at [phone] or toll-free at [phone].     My Clinical Pharmacist's contact information:                                                      It was a pleasure talking with you today!  Please feel free to contact me with any questions or concerns you have.      Xavi Mckeon, PharmD  [facility] Pharmacist    Phone: [phone]

## 2020-07-16 LAB — COPATH REPORT: NORMAL

## 2020-07-17 ENCOUNTER — TRANSFERRED RECORDS (OUTPATIENT)
Dept: HEALTH INFORMATION MANAGEMENT | Facility: CLINIC | Age: 60
End: 2020-07-17

## 2020-07-17 ENCOUNTER — TELEPHONE (OUTPATIENT)
Dept: TRANSPLANT | Facility: CLINIC | Age: 60
End: 2020-07-17

## 2020-07-17 DIAGNOSIS — Z94.1 STATUS POST HEART TRANSPLANTATION (H): ICD-10-CM

## 2020-07-17 RX ORDER — PREDNISONE 10 MG/1
TABLET ORAL
Qty: 120 TABLET | Refills: 3 | Status: SHIPPED | OUTPATIENT
Start: 2020-07-17 | End: 2020-07-31

## 2020-07-17 NOTE — TELEPHONE ENCOUNTER
Pt with negative heart biopsy.  Pt to decrease Prednisone dose to 10/5.  Pt states understanding instructions.  Next biopsy in 2 weeks.

## 2020-07-20 NOTE — PROGRESS NOTES
"Mariusz Sharp is a 59 year old male who is being evaluated via a billable telephone visit.      The patient has been notified of following:     \"This telephone visit will be conducted via a call between you and your physician/provider. We have found that certain health care needs can be provided without the need for a physical exam.  This service lets us provide the care you need with a short phone conversation.  If a prescription is necessary we can send it directly to your pharmacy.  If lab work is needed we can place an order for that and you can then stop by our lab to have the test done at a later time.    Telephone visits are billed at different rates depending on your insurance coverage. During this emergency period, for some insurers they may be billed the same as an in-person visit.  Please reach out to your insurance provider with any questions.    If during the course of the call the physician/provider feels a telephone visit is not appropriate, you will not be charged for this service.\"    Patient has given verbal consent for Telephone visit?  Yes    What phone number would you like to be contacted at? [phone]    How would you like to obtain your AVS? Lucius Beaulieu MA    Due to the COVID 19 pandemic this visit was converted to a telephone visit in order to help prevent spread of infection in this patient and the general population.    Time of start: 12:52 pm  Time of end:  1:10  Total duration of telephone visit: 18 minutes    CHANELL Sharp ( Pepe ) is a 59 year old male with type 2 diabetes mellitus.  Telephone visit today for diabetes care.  Pt was admitted 5/17/2020-5/29/2020 and underwent a heart transplant on 5/18/2020.  Red reports he continues to do well.   Pt's hx is significant for type 2 diabetes mellitus dx 3 years ago, HTN, hyperlipidemia, hx of ischemic heart disease s/p STEMI with ALYSSA 2019, LVAD placement, Stage 3 CKD, and obesity.  Apparently heart donor blood " [facility]     CONSULTATION     Patient: Jose Raul Colindres Date: 9/19/2018   female, 44 year old  Admit Date: 9/18/2018   Attending: Jann Jones MD Primary Care Physician: Aminah Tobias DO     ICU admission Date: 09/19/18    Chief Complaint: SOB    Reason for Consult: acute exaccerbation of COPD/asthma    Operation: No admission procedures for hospital encounter.    Post-operative Day:N/A                                                                                    SUMMARY OF PLAN  1. Transfer to [facility]  2. Magnesium sulfate 2 gm IV  3. Solumedrol 40 mg IV q 6 hrs, stop po prednisone  4. BIPAP FiO2=1.0 10/5  5. Continue duonebs q 4 hrs and montelukast 10 mg HS  6. Clear liquid diet  7. Re-start home opioids: IR oxycodone 15 mg tid  8. Continue azithromycin + ceftriaxone for presumed CAP RLL   9. Respiratory sputum culture  10. continue LR at 100 ml/hr  -----------------------------    Best Practice:  - VTE:enoxaparin  - SUP: famotidine  - Glycemic control: intrinsic  - LDA assessment and Removal:PIVs  - Nutrition:clear liquids  - Last BM:   - Therapy/mobilization: into chair as tolerated    ACTIVE PROBLEM LIST  1. Acute exaccerbation of COPD/asthma  2. Possible CAP RLL (doubtful)  3. Chronic benign (low back), on chronic opioids, chronic constipation  4. S/p lumbar laminectomies  5. S/p tubal ligation  6. Hx of bipolar affective disorder, on buspirone 30 mg bid, sertraline 200 mg, and trazodone 200 mg both at HS.    Disposition: stays in ICU  CODE STATUS: full     HISTORY OF PRESENT ILLNESS:    Jose Raul Colindres is a 44 year old female with the past medical history of asthma whom presented to ER with shortness of breath.     She reports shortness of breath for the past 2 weeks, using albuterol inhaler four to five times a day, whereas previously she was using it not even every other day. She does not smoke but her son smokes outside because she can't stand to be on to smoke. She is not  allergic to any environmental triggers per patient. She does not have any pets and does not have any ill contacts.     Last night she woke up in the middle of the night short of breath and felt feverish, but took Tylenol and 800 mg ibuprofen then went back to sleep. This morning she woke up significantly short of breath and unable to complete daily activities of the living. generalized body pain, intermittently runny nose, a 10 out of 10 frontal throbbing headache that only got better when she slept, and some chest tightness.  She tried ibuprofen and 15 mg Oxycodone with no relief. She is not on oxygen at home. She is exposed to passive smoke from her son at home. And is a former smoker, having smoked for 24 years with half a pack per day ending in 2017.      In the emergency room she was febrile to 102 Fahrenheit, tachy to 130 beats per minute, and desaturated to the upper 80s, requiring 3 liters of oxygen. Physical exam was significant for tachycardia, respiratory distress, wheezing. Physical exam was significant for sinus tachycardia and x-ray just revealed a focal opacity in the left lower lung possibly resembling infiltrate. There was mild patchy infiltrates suspected in the right lower lung. Labs significant for negative lipase, no leukocytosis, -troponin. Blood gases show very mild basic at 7.46 and base excess at 4. Lactic acid normal.     In the ER treated empirically with duo-nebs, tylenol/ibuprofen, ceftriaxone/azithromycin, 60mg prednisone, and is being transferred to medical floor for continued medical care. [facility], and treated with antibiotics, fluids, oral prednisone, oxygen, and bronchodilators.    This morning, I was called by Dr. Da Silva ([facility]) to evaluate the patient for ICU admission, as she continued to have inspiratory and expiratory wheezes and was hypoxemic (SaO2=88% on FiO2=0.4). I transferred her to the [facility] for further care (see orders and  cx and respiratory cultures were + for H flu, staph and strep.  Red was also admitted 5/12-5/15/2020 for ANDREW due to nephrolithiasis complicated by hydronephrosis/ANDREW.  Post heart transplant he received high dose steroids and he is currently on a prednisone taper 15 mg each am and 10 mg each pm.  For his diabetes, pt is currently taking Lantus 35 units subcutaneous each pm and Novolog 1 unit/8 gms CHO with meals with correction scale  ( 1 unit/25 for BG > 140 ) and bedtime correction scale ( 1 unit/25 for BG > 200).  He reduced his insulin dose due to hypoglycemia.  His prednisone dose has been tapered to 10 mg in am and 5 mg pm.  Blood sugars now are in the 102 to low 100 range.  I had him meet with our diabetes educator and he now has a DexcomG6 sensor. He is pleased with the new sensor.  I have no Dexcom download today.  Pt's A1C was 7.8 % on 6/15/2020 and his A1C was 8.0 % on 5/12/2020 and his A1C was 9.5 % on 7/5/2019. Pt is anemic.  On ROS today, doing well. He states chest incision has healed.  No fevers or chills  Pt denies headaches, blurred vision, n/v, SOB at rest, abd pain, diarrhea,blood in the stool, melena, dysuria, hematuria or numbness in his feet or hands.  Red denies foot ulcers at this time.    Diabetes Care  Retinopathy:none; pt seen by Oph in Dec 2019.  Nephropathy:yes- hx of CKD/ANDREW.  Neuropathy: none.  Foot Exam:no exam today.  Taking aspirin: yes.  Lipids: LDL 82 on 6/15/2020. Pt is taking Crestor.  CAD: yes; s/p heart transplant on 5/18/2020.  Depression: no.  Insulin: Basal and meal time insulin with correction scale ( 1 unit/25 for BG > 140 with meals and > 200 at bedtime).  Testing: DexcomG6 sensor.    ROS  See under HPI.    Allergies  No Known Allergies    Medications  Current Outpatient Medications   Medication Sig Dispense Refill     ACCU-CHEK [place]LOTTE PLUS test strip Check BG 3 times daily at meals and at bedtime. 300 strip 0     acetaminophen (TYLENOL) 325 MG tablet Take 2 tablets  (650 mg) by mouth every 4 hours as needed for other (multimodal surgical pain management along with NSAIDS and opioid medication as indicated based on pain control and physical function.)       amLODIPine (NORVASC) 5 MG tablet Take 2 tablets (10 mg) by mouth At Bedtime 90 tablet 3     aspirin (ASA) 81 MG chewable tablet Take 1 tablet (81 mg) by mouth daily 90 tablet 3     BD SILVANA U/F 32G X 4 MM insulin pen needle Use 3-4 daily. 250 each 4     calcium carbonate 600 mg-vitamin D 400 units (CALTRATE) 600-400 MG-UNIT per tablet Take 1 tablet by mouth 2 times daily (with meals)       clotrimazole 10 MG allison Take 1 Allison (10 mg) by mouth 4 times daily 360 Allison 1     Continuous Blood Gluc  (DEXCOM G6 ) DON 1 each daily 1 Device 1     Continuous Blood Gluc Sensor (DEXCOM G6 SENSOR) MISC 1 each every 10 days 9 each 3     Continuous Blood Gluc Transmit (DEXCOM G6 TRANSMITTER) MISC 1 each every 3 months 1 each 3     hydrALAZINE (APRESOLINE) 25 MG tablet Take 1 tablet (25 mg) by mouth 3 times daily 270 tablet 3     insulin aspart (NOVOLOG PEN) 100 UNIT/ML pen 1 unit / 10 gms CHO with meals,plus correction insulin. Pt uses approx 40 units in 24 hrs. 3 mL 3     insulin aspart (NOVOLOG PEN) 100 UNIT/ML pen Correction Scale - HIGH INSULIN RESISTANCE DOSING     Do Not give Correction Insulin if Pre-Meal BG less than 140.   For Pre-Meal  - 164 give 1 unit.   For Pre-Meal  - 189 give 2 units.   For Pre-Meal  - 214 give 3 units.   For Pre-Meal  - 239 give 4 units.   For Pre-Meal  - 264 give 5 units.   For Pre-Meal  - 289 give 6 units.   For Pre-Meal  - 314 give 7 units.   For Pre-Meal  - 339 give 8 units.   For Pre-Meal  - 364 give 9 units.   For Pre-Meal BG greater than or equal to 365 give 10 units  To be given with prandial insulin, and based on pre-meal blood glucose.   Notify provider if glucose greater than or equal to 350 mg/dL after administration of  plan above).      Recommendations    NEURO:   - Analgesia: acetaminophen 650 mg q 4 hrs prn, oxycodone (IR) 15 mg tid (home dose)  -CV:   - Hemodynamics:  (ST), /78 MAP 97  - Infusions: none  - Rhythm: ST  - Invasive monitoring: none    PULM:  - Vent: BIPAP 10/5, FiO2=1.0  - ABG: pending  - CXR: ?? RLL infiltrate, minor atelectasis bases    FEN/GI:  - Diet: clear liquids  - Replete lytes prn  - Bowel regimen prn    :  - SCr 0.74, BUN 11, eGFR >90  - Heredia: none    HEME:  - Hgb 13.6, Plt 206k, INR 1.0, PTT 36    ID:  - pending blood cultures, respiratory panel and respiratory sputum culture  On azithromycin + ceftriaxone    Temperature: 98.4 °F (36.9 °C), Temp  Min: 97.5 °F (36.4 °C)  Max: 101.9 °F (38.8 °C)  Lab Results   Component Value Date    WBC 4.5 09/18/2018    WBC 5.1 05/23/2018    WBC 5.1 12/24/2017    BAND 4 07/22/2016    BAND 3 07/16/2016    CRP 1.8 (H) 07/22/2016    MRSAPC NOT DETECTED 02/06/2017     Cultures: Pending    ENDO:  - POC glucose measurements: 135  BMI 30.94  Lab Results   Component Value Date    GLUCOSE 135 (H) 09/19/2018    TSH 2.096 05/09/2017         Past Medical History:   Diagnosis Date   • Anemia    • Anxiety    • Asthma     Followed by Dr. Jann Jones [phone] [facility]   • Bronchitis     Followed by Dr. Jann Jones [phone] [facility]   • Chronic pain     back sees pain mgmt Dr Swenson   • COPD (chronic obstructive pulmonary disease) (CMS/[facility])     Followed by Dr. Jann Jones [phone] [facility]   • Degenerated intervertebral disc    • Depression    • Facet joint disease (CMS/[facility])    • Finger fracture 05/2018   • Fracture     wrist, ankle (R)   • Hypoxemia     Followed by Dr. Jann Jones [phone] [facility]   • Otitis media     younger   • Pneumonia     Followed by Dr. Jann Jones [phone] [facility]   • RAD (reactive airway  correction dose. If given at mealtime, administer within 30 minutes of start of meal 3 mL 0     insulin aspart (NOVOLOG PEN) 100 UNIT/ML pen Inject 1-7 Units Subcutaneous At Bedtime HIGH INSULIN RESISTANCE DOSING    Do Not give Bedtime Correction Insulin if BG less than 200.   For  - 224 give 1 units.   For  - 249 give 2 units.   For  - 274 give 3 units.   For  - 299 give 4 units.   For  - 324 give 5 units.   For  - 349 give 6 units.   For BG greater than or equal to 350 give 7 units.   Notify provider if glucose greater than or equal to 350 mg/dL after administration of correction dose. If given at mealtime, administer within 30 minutes of start of meal 3 mL 0     insulin glargine (LANTUS PEN) 100 UNIT/ML pen Inject 30 units subcutaneous daily. 15 mL 3     magnesium oxide (MAG-OX) 400 (241.3 Mg) MG tablet Take 1 tablet (400 mg) by mouth 2 times daily 90 tablet 3     mycophenolate (GENERIC EQUIVALENT) 500 MG tablet TAKE THREE TABLETS BY MOUTH TWICE A  tablet 0     pantoprazole (PROTONIX) 40 MG EC tablet Take 1 tablet (40 mg) by mouth every morning (before breakfast) 30 tablet 0     predniSONE (DELTASONE) 10 MG tablet Take 1 tablet (10 mg) by mouth every morning AND 0.5 tablets (5 mg) every evening. 120 tablet 3     rosuvastatin (CRESTOR) 10 MG tablet Take 1 tablet (10 mg) by mouth daily 30 tablet 0     tacrolimus (GENERIC EQUIVALENT) 1 MG capsule Take 3 capsules with one 0.5mg capule (3.5 mg) by mouth every morning AND 3 capsules  (3mg) every evening. (Patient taking differently: Take 3 capsules with one 0.5mg capule (3.5 mg) by mouth every morning AND 3 capsules  (3.5mg) every evening.) 150 capsule 1     valGANciclovir (VALCYTE) 450 MG tablet TAKE ONE TABLET (450MG) BY MOUTH DAILY 30 tablet 3     tacrolimus (GENERIC EQUIVALENT) 0.5 MG capsule Take one 0.5mg capsule WITH three 1mg capsules to equal 3.5mg every morning. 120 capsule 0       Family History  Mother and father  disease)     Followed by Dr. Jann Jones [phone] [facility]   • Scoliosis    • Unspecified sinusitis (chronic)    • Urinary tract infection    • Use of cane as ambulatory aid       Past Surgical History:   Procedure Laterality Date   • Ankle surgery Right 2008    fracture   • Arthroscopy knee medial&latera Right 02/09/2017   • Back surgery  2013ish    compressed disc   • Finger closed reduction w/ percutaneous pinning Left 05/29/2018    CRPP L ring finger middle phalanx fracture   • Sinus surgery      x4     • Tubal ligation  1998   • Tympanostomy tube placement       Prescriptions Prior to Admission   Medication Sig Dispense Refill   • albuterol 108 (90 Base) MCG/ACT inhaler Inhale 2 puffs into the lungs every 4 hours as needed for Shortness of Breath or Wheezing. 1 Inhaler 4   • sertraline (ZOLOFT) 100 MG tablet Take 2 tablets by mouth every morning. 60 tablet 3   • traZODone (DESYREL) 100 MG tablet Take 2 tablets by mouth nightly. 60 tablet 3   • oxyCODONE-acetaminophen (PERCOCET) 5-325 MG per tablet Take 1-2 tablets by mouth every 4 hours as needed for Pain. 15 tablet 0   • ibuprofen (MOTRIN) 600 MG tablet Take 1 tablet by mouth every 6 hours as needed for Pain. 30 tablet 0   • pregabalin (LYRICA) 300 MG capsule Take 1 capsule by mouth 2 times daily. 60 capsule 3   • busPIRone (BUSPAR) 30 MG tablet Take 1 tablet by mouth 2 times daily. 60 tablet 3   • montelukast (SINGULAIR) 10 MG tablet Take 1 tablet by mouth nightly. 30 tablet 6   • albuterol-ipratropium 2.5 mg/0.5 mg (DUONEB) 0.5-2.5 (3) MG/3ML nebulizer solution Take 3 mLs by nebulization every 6 hours as needed for Wheezing or Shortness of Breath. 360 mL 12   • nicotine (NICODERM) 21 MG/24HR patch Place 1 patch onto the skin every 24 hours. 28 patch 3   • oxycodone (ROXICODONE) 30 MG immediate release tablet Take 1 tablet by mouth every 6 hours as needed for Pain. 30 tablet 0   • tiZANidine (ZANAFLEX) 4 MG tablet Take 4 mg by mouth  3 times daily as needed. Indications: Muscle Spasticity     • zaleplon (SONATA) 10 MG capsule One tablet hs prn insomnia. Fill on or after 8/28/18. 30 capsule 0   • Cariprazine HCl (VRAYLAR) 1.5 MG capsule Take 1 capsule by mouth daily. 30 capsule 3   • ibuprofen (MOTRIN) 800 MG tablet Take 1 tablet by mouth 3 times daily as needed for Pain. 15 tablet 0     ALLERGIES:   Allergen Reactions   • Naproxen SHORTNESS OF BREATH   • Codeine       Social History   Substance Use Topics   • Smoking status: Former Smoker     Packs/day: 0.50     Years: 24.00     Types: Cigarettes     Start date: 7/25/1999     Quit date: 5/2/2017   • Smokeless tobacco: Never Used   • Alcohol use No      Family History   Problem Relation Age of Onset   • Diabetes Mother    • COPD Mother    • Depression Mother    • High cholesterol Mother    • Hypertension Mother    • Cancer Maternal Grandmother         lung        Review of Systems:   Constitutional:chroinc low back pain on opioids  Neurologic: anxiety disorder  Cardiac:denies MI, CHF, valvular disease  Pulmonary: chronic COPD/asthma, stopped smoking 3 years ago. 0.5 ppd x 25 yrs.  GI:denies melema, N/V, diarrhea, c/o chronic constipation related to opioids  Renal:denies  Hematologic: denies  Extremities:denies  Skin: denies rashes    Infusions:   • lactated ringers infusion 125 mL/hr at 09/19/18 1043      Prn Med:  albuterol-ipratropium 2.5 mg/0.5 mg, HYDROmorphone, albuterol, polyethylene glycol, sodium chloride (PF), sodium chloride (PF), potassium chloride, potassium chloride, potassium chloride, potassium chloride, sodium chloride, acetaminophen, docusate sodium-sennosides, bisacodyl, magnesium hydroxide, aluminum-magnesium hydroxide-simethicone, magnesium sulfate, magnesium sulfate, magnesium sulfate, albuterol, oxycodone, zolpidem, ibuprofen, guaiFENesin       Vital Last Value 24 Hour Range   Temperature 98.4 °F (36.9 °C) (09/19/18 0911) Temp  Min: 97.5 °F (36.4 °C)  Max: 101.9 °F (38.8 °C)  with hx of type 2 DM.  Social History   reports that he has never smoked. He has never used smokeless tobacco. He reports that he does not drink alcohol or use drugs.     Past Medical History  Past Medical History:   Diagnosis Date     Acute kidney injury (H)      CKD (chronic kidney disease)      Coronary artery disease      Diabetes mellitus (H)      HTN (hypertension)      Hyperlipidemia      Ischemic cardiomyopathy      LV (left ventricular) mural thrombus      Paroxysmal atrial flutter (H)      RVF (right ventricular failure) (H)      Systolic heart failure (H)      Ventricular tachycardia (H)        Past Surgical History:   Procedure Laterality Date     COLONOSCOPY N/A 12/7/2018    Procedure: COLONOSCOPY;  Surgeon: Krish Mercado MD;  Location: [facility] OR     CV HEART BIOPSY N/A 5/24/2020    Procedure: Heart Biopsy;  Surgeon: Kit Bacon MD;  Location: [facility] HEART [facility]ARDI[facility] LAB     CV HEART BIOPSY N/A 6/1/2020    Procedure: CV HEART BIOPSY;  Surgeon: Kit Bacon MD;  Location: [facility]     CV HEART BIOPSY N/A 6/8/2020    Procedure: CV HEART BIOPSY;  Surgeon: Kit Bacon MD;  Location: [facility]     CV HEART BIOPSY N/A 6/15/2020    Procedure: CV HEART BIOPSY;  Surgeon: Kit Bacon MD;  Location: [facility]     CV HEART BIOPSY N/A 6/30/2020    Procedure: CV HEART BIOPSY;  Surgeon: Kit Bacon MD;  Location: [facility]     CV HEART BIOPSY N/A 7/14/2020    Procedure: CV HEART BIOPSY;  Surgeon: Brian Long MD;  Location: [facility]     CV RIGHT HEART CATH N/A 2/19/2019    Procedure: RHC;  Surgeon: Brian Long MD;  Location: [facility]     CV RIGHT HEART CATH N/A 7/5/2019    Procedure: CV RIGHT HEART CATH;  Surgeon: Khris Mcclelland MD;  Location: [facility]     CV RIGHT HEART CATH N/A 5/24/2020       Pulse 87 (09/19/18 0911) Pulse  Min: 87  Max: 129   Respiratory 14 (09/19/18 0519) Resp  Min: 14  Max: 31   Non-Invasive  Blood Pressure 110/60 (09/19/18 0519) BP  Min: 91/54  Max: 123/72   Pulse Oximetry 97 % (09/19/18 1225) SpO2  Min: 87 %  Max: 97 %   Arterial   Blood Pressure   No Data Recorded       I/O last 3 completed shifts:  In: 240 [P.O.:240]  Out: 1 [Urine:1]  No intake/output data recorded.    Current Lines drains and Catheters:  Peripheral IV 09/18/18 Left Antecubital 20 (Active)   Site Assessment WDL 9/19/2018  9:00 AM   Dressing Type Transparent 9/19/2018  9:00 AM   Dressing Activity Monitored 9/19/2018  9:00 AM   Line Status Infusing 9/19/2018  9:00 AM   Interventions Capped IV 9/19/2018  4:00 AM       Physical Exam:  Neurologic:GCS=15, PERRL, denies pain  Neck:midline trachea, absent JVD  Chest:RR < 30, able to speak in complete sentences, inspiratory + expiration wheezes, SaO2=88% on FiO2=0.4, no use of accessory muscles  Heart: nl S1S2 w/o g or m, ST kyno=[id number]  Abdomen: + BS, soft, non-tender, not distended  Extremities:warm, non-tender, no edema  Skin:no rashes    Respiratory Rate 14 Resp  Min: 14  Max: 31   SpO2 (5 L/min) 97 % SpO2  Min: 87 %  Max: 97 %     O2 Vent Settings Last Value   Mode     Rate     Tidal Volume     Peak Inspiratory Pressure     Plateau Pressure     Pressure Support     PEEP/CPAC/EPAP     FiO2 100 %         Pertinent Reviewed: Allergies, Medications, Labs, Imaging and Physician and Nursing Notes      ACCS Attestation    This patient is critically ill as documented above. I evaluated the patient and reviewed imaging and laboratory data. Critical care services I provided [id number] > 35 minutes not including time allocated for procedures.    Hossein Suazo MD  9/19/2018         Procedure: Right Heart Cath;  Surgeon: Kit Bacon MD;  Location:  HEART CARDIAC CATH LAB     CV RIGHT HEART CATH N/A 6/1/2020    Procedure: CV RIGHT HEART CATH;  Surgeon: Kit Bacon MD;  Location:  HEART CARDIAC CATH LAB     CV RIGHT HEART CATH N/A 6/8/2020    Procedure: CV RIGHT HEART CATH;  Surgeon: Kit Bacon MD;  Location:  HEART CARDIAC CATH LAB     CV RIGHT HEART CATH N/A 6/15/2020    Procedure: CV RIGHT HEART CATH;  Surgeon: Kit Bacon MD;  Location:  HEART CARDIAC CATH LAB     CV RIGHT HEART CATH N/A 6/30/2020    Procedure: CV RIGHT HEART CATH;  Surgeon: Kit Bacon MD;  Location:  HEART CARDIAC CATH LAB     CV RIGHT HEART CATH N/A 7/14/2020    Procedure: CV RIGHT HEART CATH;  Surgeon: Brian Long MD;  Location:  HEART CARDIAC CATH LAB     HC PRQ TRLUML CORONARY ANGIOPLASTY ADDL BRANCH      PCI and ALYSSA to ostial LAD on 6/27/18     IMPLANT AUTOMATIC IMPLANTABLE CARDIOVERTER DEFIBRILLATOR       INSERT VENTRICULAR ASSIST DEVICE LEFT (HEARTMATE II) N/A 12/31/2018    Procedure: Median Sternotomy, On Cardiopulmonary Bypass Pump, Insertion of Left Ventricular Assist Device (Heartmate III);  Surgeon: Kamaljit Baker MD;  Location: [facility] OR     PICC DOUBLE LUMEN PLACEMENT Right 05/22/2020    5FR DL PICC inserted at right basilic vein, length 38cm.     TRANSPLANT HEART RECIPIENT AFTER HEART MATE N/A 5/18/2020    Procedure: REDO MEDIAN STERNOTOMY, LYSIS OF ADHESIONS, ON CARDIOPULMONARY BYPASS PUMP, HEART TRANSPLANT RECIPIENT, REMOVAL OF LEFT VENTRICULAR ASSIST DEVICE, REMOVAL OF AUTOMATED IMPLANTABLE CARDIOVERTER DEFIBRILLATOR;  Surgeon: Elder Willis MD;  Location:  OR       Physical Exam    No exam today.    RESULTS  Creatinine   Date Value Ref Range Status   07/14/2020 1.86 (H) 0.66 - 1.25 mg/dL Final     GFR Estimate   Date Value Ref Range Status   07/14/2020 39 (L) >60 mL/min/[1.73_m2]  Final     Comment:     Non  GFR Calc  Starting 12/18/2018, serum creatinine based estimated GFR (eGFR) will be   calculated using the Chronic Kidney Disease Epidemiology Collaboration   (CKD-EPI) equation.       Hemoglobin A1C   Date Value Ref Range Status   06/15/2020 7.8 (H) 0 - 5.6 % Final     Comment:     Normal <5.7% Prediabetes 5.7-6.4%  Diabetes 6.5% or higher - adopted from ADA   consensus guidelines.       Potassium   Date Value Ref Range Status   07/14/2020 4.0 3.4 - 5.3 mmol/L Final     ALT   Date Value Ref Range Status   07/14/2020 38 0 - 70 U/L Final     AST   Date Value Ref Range Status   07/14/2020 26 0 - 45 U/L Final     TSH   Date Value Ref Range Status   06/26/2019 3.94 0.40 - 4.00 mU/L Final     T4 Free   Date Value Ref Range Status   07/23/2018 1.05 0.76 - 1.46 ng/dL Final       Cholesterol   Date Value Ref Range Status   06/15/2020 183 <200 mg/dL Final   08/06/2018 126 <200 mg/dL Final     HDL Cholesterol   Date Value Ref Range Status   06/15/2020 86 >39 mg/dL Final   08/06/2018 52 >39 mg/dL Final     LDL Cholesterol Calculated   Date Value Ref Range Status   06/15/2020 82 <100 mg/dL Final     Comment:     Desirable:       <100 mg/dl   08/06/2018 24 <100 mg/dL Final     Comment:     Desirable:       <100 mg/dl     Triglycerides   Date Value Ref Range Status   06/15/2020 73 <150 mg/dL Final   08/06/2018 246 (H) <150 mg/dL Final     Comment:     Borderline high:  150-199 mg/dl  High:             200-499 mg/dl  Very high:       >499 mg/dl       A1C   7.8 % on  6/15/2020    ASSESSMENT/PLAN:      1.  TYPE 2 DIABETES MELLITUS: Type 2 diabetes mellitus complicated by hx of CAD and ischemic cardiomyopathy s/p heart transplant on 5/18/2020. Pt is doing well at this time and remains on a prednisone taper 10 mg each am and 5 mg each pm.  Pt instructed to decrease his Lantus 30 units at hs and change Novolog I/C ratio to 1:10 with current correction scale ( 1 unit/25 for BG > 140 ).  Days of  cardiac rehab, he was instructed to take 1/2 Novolog dose the meal prior to his rehab to help prevent hypoglycemia.  He is to wear his DexcomG6 sensor full time.  Pt was seen by Oph in Dec 2019 without retinopathy.  He denies any sx of neuropathy or foot ulcers at this time.  He is taking ASA daily.    2.  CKD/ANDREW: Most recent creat was 1.91 with GFR 36 mL/min on 7/2020.    3.  HX OF ISCHEMIC CARDIOMYOPATHY: S/P heart transplant on 5/18/2020 and followed closely by transplant staff here.     4.  FOLLOW UP : with me on 7/31/2020.  He is to call if he has any questions regarding his diabetes care.

## 2020-07-21 ENCOUNTER — VIRTUAL VISIT (OUTPATIENT)
Dept: ENDOCRINOLOGY | Facility: CLINIC | Age: 60
End: 2020-07-21
Payer: COMMERCIAL

## 2020-07-21 ENCOUNTER — TELEPHONE (OUTPATIENT)
Dept: TRANSPLANT | Facility: CLINIC | Age: 60
End: 2020-07-21

## 2020-07-21 DIAGNOSIS — Z94.1 STATUS POST HEART TRANSPLANTATION (H): ICD-10-CM

## 2020-07-21 RX ORDER — TACROLIMUS 0.5 MG/1
CAPSULE ORAL
Qty: 120 CAPSULE | Refills: 0 | Status: SHIPPED | OUTPATIENT
Start: 2020-07-21 | End: 2020-08-14

## 2020-07-21 NOTE — PATIENT INSTRUCTIONS
Today we reduced your Lantus 30 units at bedtime.  We also changed your Novolog 1 unit/10 gms carbs with meals, plus correction since your prednisone dose has been further reduced.  I will call you on Friday 7/31/2020 at 3 pm to review your blood sugar data.  Please notify me if you continue to have frequent low blood sugars ( less than 70 ) or persistent high blood sugars ( over 250 ).  I am glad you like the DexcomG6 sensor.  Sincerely,  Jeannette Schafer PA-C

## 2020-07-21 NOTE — LETTER
"7/21/2020       RE: Mariusz Sharp  [address] [facility] [address]     Dear Colleague,    Thank you for referring your patient, Mariusz Sharp, to the [facility] ENDOCRINOLOGY at [facility]. Please see a copy of my visit note below.    Mariusz Sharp is a 59 year old male who is being evaluated via a billable telephone visit.      The patient has been notified of following:     \"This telephone visit will be conducted via a call between you and your physician/provider. We have found that certain health care needs can be provided without the need for a physical exam.  This service lets us provide the care you need with a short phone conversation.  If a prescription is necessary we can send it directly to your pharmacy.  If lab work is needed we can place an order for that and you can then stop by our lab to have the test done at a later time.    Telephone visits are billed at different rates depending on your insurance coverage. During this emergency period, for some insurers they may be billed the same as an in-person visit.  Please reach out to your insurance provider with any questions.    If during the course of the call the physician/provider feels a telephone visit is not appropriate, you will not be charged for this service.\"    Patient has given verbal consent for Telephone visit?  Yes    What phone number would you like to be contacted at? [phone]    How would you like to obtain your AVS? Lucius Beaulieu MA    Due to the COVID 19 pandemic this visit was converted to a telephone visit in order to help prevent spread of infection in this patient and the general population.    Time of start: 12:52 pm  Time of end:  1:10  Total duration of telephone visit: 18 minutes    HPI  Mariusz Moon ) ISABEL Sharp is a 59 year old male with type 2 diabetes mellitus.  Telephone visit today for diabetes care.  Pt was admitted 5/17/2020-5/29/2020 and underwent a heart " ----- Message from Kaylen Durham sent at 2/28/2019  9:57 AM CST -----  Contact: Dr. Benigno Frey ([facility])-[phone]  Dr. Frey is requesting to speak to someone regarding the patient's referral for an AFO. Please contact the patient to discuss further.     transplant on 5/18/2020.  Red reports he continues to do well.   Pt's hx is significant for type 2 diabetes mellitus dx 3 years ago, HTN, hyperlipidemia, hx of ischemic heart disease s/p STEMI with ALYSSA 2019, LVAD placement, Stage 3 CKD, and obesity.  Apparently heart donor blood cx and respiratory cultures were + for H flu, staph and strep.  Red was also admitted 5/12-5/15/2020 for ANDREW due to nephrolithiasis complicated by hydronephrosis/ANDREW.  Post heart transplant he received high dose steroids and he is currently on a prednisone taper 15 mg each am and 10 mg each pm.  For his diabetes, pt is currently taking Lantus 35 units subcutaneous each pm and Novolog 1 unit/8 gms CHO with meals with correction scale  ( 1 unit/25 for BG > 140 ) and bedtime correction scale ( 1 unit/25 for BG > 200).  He reduced his insulin dose due to hypoglycemia.  His prednisone dose has been tapered to 10 mg in am and 5 mg pm.  Blood sugars now are in the 102 to low 100 range.  I had him meet with our diabetes educator and he now has a DexcomG6 sensor. He is pleased with the new sensor.  I have no Dexcom download today.  Pt's A1C was 7.8 % on 6/15/2020 and his A1C was 8.0 % on 5/12/2020 and his A1C was 9.5 % on 7/5/2019. Pt is anemic.  On ROS today, doing well. He states chest incision has healed.  No fevers or chills  Pt denies headaches, blurred vision, n/v, SOB at rest, abd pain, diarrhea,blood in the stool, melena, dysuria, hematuria or numbness in his feet or hands.  Red denies foot ulcers at this time.    Diabetes Care  Retinopathy:none; pt seen by Oph in Dec 2019.  Nephropathy:yes- hx of CKD/ANDREW.  Neuropathy: none.  Foot Exam:no exam today.  Taking aspirin: yes.  Lipids: LDL 82 on 6/15/2020. Pt is taking Crestor.  CAD: yes; s/p heart transplant on 5/18/2020.  Depression: no.  Insulin: Basal and meal time insulin with correction scale ( 1 unit/25 for BG > 140 with meals and > 200 at bedtime).  Testing: DexcomG6 sensor.    ROS  See  under HPI.    Allergies  No Known Allergies    Medications  Current Outpatient Medications   Medication Sig Dispense Refill     ACCU-CHEK [place]LOTTE PLUS test strip Check BG 3 times daily at meals and at bedtime. 300 strip 0     acetaminophen (TYLENOL) 325 MG tablet Take 2 tablets (650 mg) by mouth every 4 hours as needed for other (multimodal surgical pain management along with NSAIDS and opioid medication as indicated based on pain control and physical function.)       amLODIPine (NORVASC) 5 MG tablet Take 2 tablets (10 mg) by mouth At Bedtime 90 tablet 3     aspirin (ASA) 81 MG chewable tablet Take 1 tablet (81 mg) by mouth daily 90 tablet 3     BD SILVANA U/F 32G X 4 MM insulin pen needle Use 3-4 daily. 250 each 4     calcium carbonate 600 mg-vitamin D 400 units (CALTRATE) 600-400 MG-UNIT per tablet Take 1 tablet by mouth 2 times daily (with meals)       clotrimazole 10 MG sushant Take 1 Sushant (10 mg) by mouth 4 times daily 360 Sushant 1     Continuous Blood Gluc  (DEXCOM G6 ) DON 1 each daily 1 Device 1     Continuous Blood Gluc Sensor (DEXCOM G6 SENSOR) MISC 1 each every 10 days 9 each 3     Continuous Blood Gluc Transmit (DEXCOM G6 TRANSMITTER) MISC 1 each every 3 months 1 each 3     hydrALAZINE (APRESOLINE) 25 MG tablet Take 1 tablet (25 mg) by mouth 3 times daily 270 tablet 3     insulin aspart (NOVOLOG PEN) 100 UNIT/ML pen 1 unit / 10 gms CHO with meals,plus correction insulin. Pt uses approx 40 units in 24 hrs. 3 mL 3     insulin aspart (NOVOLOG PEN) 100 UNIT/ML pen Correction Scale - HIGH INSULIN RESISTANCE DOSING     Do Not give Correction Insulin if Pre-Meal BG less than 140.   For Pre-Meal  - 164 give 1 unit.   For Pre-Meal  - 189 give 2 units.   For Pre-Meal  - 214 give 3 units.   For Pre-Meal  - 239 give 4 units.   For Pre-Meal  - 264 give 5 units.   For Pre-Meal  - 289 give 6 units.   For Pre-Meal  - 314 give 7 units.   For Pre-Meal  - 339  give 8 units.   For Pre-Meal  - 364 give 9 units.   For Pre-Meal BG greater than or equal to 365 give 10 units  To be given with prandial insulin, and based on pre-meal blood glucose.   Notify provider if glucose greater than or equal to 350 mg/dL after administration of correction dose. If given at mealtime, administer within 30 minutes of start of meal 3 mL 0     insulin aspart (NOVOLOG PEN) 100 UNIT/ML pen Inject 1-7 Units Subcutaneous At Bedtime HIGH INSULIN RESISTANCE DOSING    Do Not give Bedtime Correction Insulin if BG less than 200.   For  - 224 give 1 units.   For  - 249 give 2 units.   For  - 274 give 3 units.   For  - 299 give 4 units.   For  - 324 give 5 units.   For  - 349 give 6 units.   For BG greater than or equal to 350 give 7 units.   Notify provider if glucose greater than or equal to 350 mg/dL after administration of correction dose. If given at mealtime, administer within 30 minutes of start of meal 3 mL 0     insulin glargine (LANTUS PEN) 100 UNIT/ML pen Inject 30 units subcutaneous daily. 15 mL 3     magnesium oxide (MAG-OX) 400 (241.3 Mg) MG tablet Take 1 tablet (400 mg) by mouth 2 times daily 90 tablet 3     mycophenolate (GENERIC EQUIVALENT) 500 MG tablet TAKE THREE TABLETS BY MOUTH TWICE A  tablet 0     pantoprazole (PROTONIX) 40 MG EC tablet Take 1 tablet (40 mg) by mouth every morning (before breakfast) 30 tablet 0     predniSONE (DELTASONE) 10 MG tablet Take 1 tablet (10 mg) by mouth every morning AND 0.5 tablets (5 mg) every evening. 120 tablet 3     rosuvastatin (CRESTOR) 10 MG tablet Take 1 tablet (10 mg) by mouth daily 30 tablet 0     tacrolimus (GENERIC EQUIVALENT) 1 MG capsule Take 3 capsules with one 0.5mg capule (3.5 mg) by mouth every morning AND 3 capsules  (3mg) every evening. (Patient taking differently: Take 3 capsules with one 0.5mg capule (3.5 mg) by mouth every morning AND 3 capsules  (3.5mg) every evening.) 150 capsule 1      valGANciclovir (VALCYTE) 450 MG tablet TAKE ONE TABLET (450MG) BY MOUTH DAILY 30 tablet 3     tacrolimus (GENERIC EQUIVALENT) 0.5 MG capsule Take one 0.5mg capsule WITH three 1mg capsules to equal 3.5mg every morning. 120 capsule 0       Family History  Mother and father with hx of type 2 DM.  Social History   reports that he has never smoked. He has never used smokeless tobacco. He reports that he does not drink alcohol or use drugs.     Past Medical History  Past Medical History:   Diagnosis Date     Acute kidney injury (H)      CKD (chronic kidney disease)      Coronary artery disease      Diabetes mellitus (H)      HTN (hypertension)      Hyperlipidemia      Ischemic cardiomyopathy      LV (left ventricular) mural thrombus      Paroxysmal atrial flutter (H)      RVF (right ventricular failure) (H)      Systolic heart failure (H)      Ventricular tachycardia (H)        Past Surgical History:   Procedure Laterality Date     COLONOSCOPY N/A 12/7/2018    Procedure: COLONOSCOPY;  Surgeon: Krish Mercado MD;  [facility]     CV HEART BIOPSY N/A 5/24/2020    Procedure: Heart Biopsy;  Surgeon: Kit Bacon MD;  [facility]     CV HEART BIOPSY N/A 6/1/2020    Procedure: CV HEART BIOPSY;  Surgeon: Kit Bacon MD;  [facility]     CV HEART BIOPSY N/A 6/8/2020    Procedure: CV HEART BIOPSY;  Surgeon: Kit Bacon MD;  [facility]     CV HEART BIOPSY N/A 6/15/2020    Procedure: CV HEART BIOPSY;  Surgeon: Kit Bacon MD;  [facility]     CV HEART BIOPSY N/A 6/30/2020    Procedure: CV HEART BIOPSY;  Surgeon: Kit Bacon MD;  [facility]     CV HEART BIOPSY N/A 7/14/2020    Procedure: CV HEART BIOPSY;  Surgeon: Brian Long MD;  Location:  HEART CARDIAC CATH LAB     CV RIGHT HEART CATH N/A 2/19/2019     Procedure: RHC;  Surgeon: Brian Long MD;  Location:  HEART CARDIAC CATH LAB     CV RIGHT HEART CATH N/A 7/5/2019    Procedure: CV RIGHT HEART CATH;  Surgeon: Khris Mcclelland MD;  Location:  HEART CARDIAC CATH LAB     CV RIGHT HEART CATH N/A 5/24/2020    Procedure: Right Heart Cath;  Surgeon: Kit Bacon MD;  Location:  HEART CARDIAC CATH LAB     CV RIGHT HEART CATH N/A 6/1/2020    Procedure: CV RIGHT HEART CATH;  Surgeon: Kit Bacon MD;  Location:  HEART CARDIAC CATH LAB     CV RIGHT HEART CATH N/A 6/8/2020    Procedure: CV RIGHT HEART CATH;  Surgeon: Kit Bacon MD;  Location:  HEART CARDIAC CATH LAB     CV RIGHT HEART CATH N/A 6/15/2020    Procedure: CV RIGHT HEART CATH;  Surgeon: Kit Bacon MD;  Location:  HEART CARDIAC CATH LAB     CV RIGHT HEART CATH N/A 6/30/2020    Procedure: CV RIGHT HEART CATH;  Surgeon: Kit Bacon MD;  Location:  HEART CARDIAC CATH LAB     CV RIGHT HEART CATH N/A 7/14/2020    Procedure: CV RIGHT HEART CATH;  Surgeon: Brian Long MD;  Location:  HEART CARDIAC CATH LAB     HC PRQ TRLUML CORONARY ANGIOPLASTY ADDL BRANCH      PCI and ALYSSA to ostial LAD on 6/27/18     IMPLANT AUTOMATIC IMPLANTABLE CARDIOVERTER DEFIBRILLATOR       INSERT VENTRICULAR ASSIST DEVICE LEFT (HEARTMATE II) N/A 12/31/2018    Procedure: Median Sternotomy, On Cardiopulmonary Bypass Pump, Insertion of Left Ventricular Assist Device (Heartmate III);  Surgeon: Kamaljit Baker MD;  [facility]     PICC DOUBLE LUMEN PLACEMENT Right 05/22/2020    5FR DL PICC inserted at right basilic vein, length 38cm.     TRANSPLANT HEART RECIPIENT AFTER HEART MATE N/A 5/18/2020    Procedure: REDO MEDIAN STERNOTOMY, LYSIS OF ADHESIONS, ON CARDIOPULMONARY BYPASS PUMP, HEART TRANSPLANT RECIPIENT, REMOVAL OF LEFT VENTRICULAR ASSIST DEVICE, REMOVAL OF AUTOMATED IMPLANTABLE CARDIOVERTER DEFIBRILLATOR;  Surgeon:  Elder Willis MD;  Location: [facility]       Physical Exam    No exam today.    RESULTS  Creatinine   Date Value Ref Range Status   07/14/2020 1.86 (H) 0.66 - 1.25 mg/dL Final     GFR Estimate   Date Value Ref Range Status   07/14/2020 39 (L) >60 mL/min/[1.73_m2] Final     Comment:     Non  GFR Calc  Starting 12/18/2018, serum creatinine based estimated GFR (eGFR) will be   calculated using the Chronic Kidney Disease Epidemiology Collaboration   (CKD-EPI) equation.       Hemoglobin A1C   Date Value Ref Range Status   06/15/2020 7.8 (H) 0 - 5.6 % Final     Comment:     Normal <5.7% Prediabetes 5.7-6.4%  Diabetes 6.5% or higher - adopted from ADA   consensus guidelines.       Potassium   Date Value Ref Range Status   07/14/2020 4.0 3.4 - 5.3 mmol/L Final     ALT   Date Value Ref Range Status   07/14/2020 38 0 - 70 U/L Final     AST   Date Value Ref Range Status   07/14/2020 26 0 - 45 U/L Final     TSH   Date Value Ref Range Status   06/26/2019 3.94 0.40 - 4.00 mU/L Final     T4 Free   Date Value Ref Range Status   07/23/2018 1.05 0.76 - 1.46 ng/dL Final       Cholesterol   Date Value Ref Range Status   06/15/2020 183 <200 mg/dL Final   08/06/2018 126 <200 mg/dL Final     HDL Cholesterol   Date Value Ref Range Status   06/15/2020 86 >39 mg/dL Final   08/06/2018 52 >39 mg/dL Final     LDL Cholesterol Calculated   Date Value Ref Range Status   06/15/2020 82 <100 mg/dL Final     Comment:     Desirable:       <100 mg/dl   08/06/2018 24 <100 mg/dL Final     Comment:     Desirable:       <100 mg/dl     Triglycerides   Date Value Ref Range Status   06/15/2020 73 <150 mg/dL Final   08/06/2018 246 (H) <150 mg/dL Final     Comment:     Borderline high:  150-199 mg/dl  High:             200-499 mg/dl  Very high:       >499 mg/dl       A1C   7.8 % on  6/15/2020    ASSESSMENT/PLAN:      1.  TYPE 2 DIABETES MELLITUS: Type 2 diabetes mellitus complicated by hx of CAD and ischemic cardiomyopathy s/p heart  transplant on 5/18/2020. Pt is doing well at this time and remains on a prednisone taper 10 mg each am and 5 mg each pm.  Pt instructed to decrease his Lantus 30 units at hs and change Novolog I/C ratio to 1:10 with current correction scale ( 1 unit/25 for BG > 140 ).  Days of cardiac rehab, he was instructed to take 1/2 Novolog dose the meal prior to his rehab to help prevent hypoglycemia.  He is to wear his DexcomG6 sensor full time.  Pt was seen by Oph in Dec 2019 without retinopathy.  He denies any sx of neuropathy or foot ulcers at this time.  He is taking ASA daily.    2.  CKD/ANDREW: Most recent creat was 1.91 with GFR 36 mL/min on 7/2020.    3.  HX OF ISCHEMIC CARDIOMYOPATHY: S/P heart transplant on 5/18/2020 and followed closely by transplant staff here.     4.  FOLLOW UP : with me on 7/31/2020.  He is to call if he has any questions regarding his diabetes care.      Again, thank you for allowing me to participate in the care of your patient.      Sincerely,    Jeannette Schafer PA-C

## 2020-07-21 NOTE — TELEPHONE ENCOUNTER
Tacrolimus level 12.7 at outside lab.  Goal 10-12. Pt to decrease Tacrolimus dose to 3.5mg in AM and 3mg in PM.  Pt to recheck locally this Friday, and mail to us.  Pt states understanding instructions.

## 2020-07-24 ENCOUNTER — TELEPHONE (OUTPATIENT)
Dept: TRANSPLANT | Facility: CLINIC | Age: 60
End: 2020-07-24

## 2020-07-24 ENCOUNTER — PRE VISIT (OUTPATIENT)
Dept: TRANSPLANT | Facility: CLINIC | Age: 60
End: 2020-07-24

## 2020-07-24 DIAGNOSIS — Z94.1 STATUS POST HEART TRANSPLANTATION (H): Primary | ICD-10-CM

## 2020-07-24 DIAGNOSIS — Z94.1 HEART REPLACED BY TRANSPLANT (H): Primary | ICD-10-CM

## 2020-07-24 PROCEDURE — 80197 ASSAY OF TACROLIMUS: CPT | Performed by: INTERNAL MEDICINE

## 2020-07-24 RX ORDER — LIDOCAINE 40 MG/G
CREAM TOPICAL
Status: CANCELLED | OUTPATIENT
Start: 2020-07-24

## 2020-07-24 NOTE — TELEPHONE ENCOUNTER
"Pt had chest x ray last week at [facility].  Lungs are clear.  Report mentions a \"catheter in the soft tissue\" of left chest.  Pt is unsure what this might be.  He wears his Dexcom monitor in his abdomen.  Will follow up at visit next week.  "

## 2020-07-27 DIAGNOSIS — Z94.1 STATUS POST HEART TRANSPLANTATION (H): ICD-10-CM

## 2020-07-27 RX ORDER — ROSUVASTATIN CALCIUM 10 MG/1
TABLET, COATED ORAL
Qty: 90 TABLET | Refills: 3 | Status: SHIPPED | OUTPATIENT
Start: 2020-07-27 | End: 2021-10-07

## 2020-07-27 RX ORDER — PANTOPRAZOLE SODIUM 40 MG/1
TABLET, DELAYED RELEASE ORAL
Qty: 90 TABLET | Refills: 3 | Status: SHIPPED | OUTPATIENT
Start: 2020-07-27 | End: 2020-08-27

## 2020-07-28 ENCOUNTER — TELEPHONE (OUTPATIENT)
Dept: CARDIOLOGY | Facility: CLINIC | Age: 60
End: 2020-07-28

## 2020-07-28 ENCOUNTER — TELEPHONE (OUTPATIENT)
Dept: TRANSPLANT | Facility: CLINIC | Age: 60
End: 2020-07-28

## 2020-07-28 DIAGNOSIS — Z94.1 HEART REPLACED BY TRANSPLANT (H): Primary | ICD-10-CM

## 2020-07-28 DIAGNOSIS — Z94.1 STATUS POST HEART TRANSPLANTATION (H): Primary | ICD-10-CM

## 2020-07-28 LAB
TACROLIMUS BLD-MCNC: 9.8 UG/L (ref 5–15)
TME LAST DOSE: NORMAL H

## 2020-07-28 NOTE — TELEPHONE ENCOUNTER
"Reviewed chest xray that was done at [facility] in [place] with Dr. Ortiz.  Per x ray \"catheter seen in left chest\".  Dr. Ortiz believes this is a retained pacer wire from old ICD.  Pt to have Chest CT tomorrow AM to confirm when he is here for his appointments.  Pt called and notified of CT at 9am.  He is agreeable to plan.  "

## 2020-07-28 NOTE — TELEPHONE ENCOUNTER
Call complete for pre procedure reminder, travel screen and updated visitor policy.    No covid test d/t recent heart tx

## 2020-07-29 ENCOUNTER — ALLIED HEALTH/NURSE VISIT (OUTPATIENT)
Dept: EDUCATION SERVICES | Facility: CLINIC | Age: 60
End: 2020-07-29
Payer: COMMERCIAL

## 2020-07-29 ENCOUNTER — APPOINTMENT (OUTPATIENT)
Dept: MEDSURG UNIT | Facility: CLINIC | Age: 60
End: 2020-07-29
Attending: NURSE PRACTITIONER
Payer: COMMERCIAL

## 2020-07-29 ENCOUNTER — TELEPHONE (OUTPATIENT)
Dept: TRANSPLANT | Facility: CLINIC | Age: 60
End: 2020-07-29

## 2020-07-29 ENCOUNTER — HOSPITAL ENCOUNTER (OUTPATIENT)
Facility: CLINIC | Age: 60
Discharge: HOME | End: 2020-07-29
Attending: INTERNAL MEDICINE | Admitting: INTERNAL MEDICINE
Payer: COMMERCIAL

## 2020-07-29 ENCOUNTER — HOSPITAL ENCOUNTER (OUTPATIENT)
Dept: CT IMAGING | Facility: CLINIC | Age: 60
End: 2020-07-29
Attending: INTERNAL MEDICINE | Admitting: INTERNAL MEDICINE
Payer: COMMERCIAL

## 2020-07-29 ENCOUNTER — VIRTUAL VISIT (OUTPATIENT)
Dept: CARDIOLOGY | Facility: CLINIC | Age: 60
End: 2020-07-29
Attending: NURSE PRACTITIONER
Payer: COMMERCIAL

## 2020-07-29 VITALS
HEIGHT: 64 IN | BODY MASS INDEX: 30.39 KG/M2 | SYSTOLIC BLOOD PRESSURE: 148 MMHG | RESPIRATION RATE: 20 BRPM | DIASTOLIC BLOOD PRESSURE: 99 MMHG | TEMPERATURE: 98.1 F | OXYGEN SATURATION: 98 % | WEIGHT: 178 LBS

## 2020-07-29 DIAGNOSIS — E11.65 TYPE 2 DIABETES MELLITUS WITH HYPERGLYCEMIA, WITH LONG-TERM CURRENT USE OF INSULIN (H): Primary | ICD-10-CM

## 2020-07-29 DIAGNOSIS — Z79.4 TYPE 2 DIABETES MELLITUS WITH HYPERGLYCEMIA, WITH LONG-TERM CURRENT USE OF INSULIN (H): Primary | ICD-10-CM

## 2020-07-29 DIAGNOSIS — Z94.1 STATUS POST HEART TRANSPLANTATION (H): ICD-10-CM

## 2020-07-29 DIAGNOSIS — Z94.1 HEART REPLACED BY TRANSPLANT (H): ICD-10-CM

## 2020-07-29 DIAGNOSIS — Z94.1 STATUS POST HEART TRANSPLANTATION (H): Primary | ICD-10-CM

## 2020-07-29 DIAGNOSIS — Z94.1 HEART REPLACED BY TRANSPLANT (H): Primary | ICD-10-CM

## 2020-07-29 LAB
ALBUMIN SERPL-MCNC: 3.4 G/DL (ref 3.4–5)
ALP SERPL-CCNC: 42 U/L (ref 40–150)
ALT SERPL W P-5'-P-CCNC: 32 U/L (ref 0–70)
ANION GAP SERPL CALCULATED.3IONS-SCNC: 7 MMOL/L (ref 3–14)
AST SERPL W P-5'-P-CCNC: 20 U/L (ref 0–45)
BILIRUB DIRECT SERPL-MCNC: 0.2 MG/DL (ref 0–0.2)
BILIRUB SERPL-MCNC: 0.6 MG/DL (ref 0.2–1.3)
BUN SERPL-MCNC: 61 MG/DL (ref 7–30)
CALCIUM SERPL-MCNC: 8.8 MG/DL (ref 8.5–10.1)
CHLORIDE SERPL-SCNC: 106 MMOL/L (ref 94–109)
CO2 SERPL-SCNC: 25 MMOL/L (ref 20–32)
CREAT SERPL-MCNC: 2.26 MG/DL (ref 0.66–1.25)
ERYTHROCYTE [DISTWIDTH] IN BLOOD BY AUTOMATED COUNT: 19 % (ref 10–15)
GFR SERPL CREATININE-BSD FRML MDRD: 31 ML/MIN/{1.73_M2}
GLUCOSE SERPL-MCNC: 151 MG/DL (ref 70–99)
HCT VFR BLD AUTO: 28.9 % (ref 40–53)
HGB BLD-MCNC: 8.8 G/DL (ref 13.3–17.7)
MAGNESIUM SERPL-MCNC: 1.6 MG/DL (ref 1.6–2.3)
MCH RBC QN AUTO: 27.2 PG (ref 26.5–33)
MCHC RBC AUTO-ENTMCNC: 30.4 G/DL (ref 31.5–36.5)
MCV RBC AUTO: 90 FL (ref 78–100)
PHOSPHATE SERPL-MCNC: 3.9 MG/DL (ref 2.5–4.5)
PLATELET # BLD AUTO: 252 10E9/L (ref 150–450)
POTASSIUM SERPL-SCNC: 4.4 MMOL/L (ref 3.4–5.3)
PROT SERPL-MCNC: 6.3 G/DL (ref 6.8–8.8)
RBC # BLD AUTO: 3.23 10E12/L (ref 4.4–5.9)
SODIUM SERPL-SCNC: 138 MMOL/L (ref 133–144)
TACROLIMUS BLD-MCNC: 10.3 UG/L (ref 5–15)
TME LAST DOSE: NORMAL H
WBC # BLD AUTO: 1.7 10E9/L (ref 4–11)

## 2020-07-29 PROCEDURE — 85027 COMPLETE CBC AUTOMATED: CPT | Performed by: INTERNAL MEDICINE

## 2020-07-29 PROCEDURE — 27210794 ZZH OR GENERAL SUPPLY STERILE: Performed by: INTERNAL MEDICINE

## 2020-07-29 PROCEDURE — 25000125 ZZHC RX 250: Performed by: INTERNAL MEDICINE

## 2020-07-29 PROCEDURE — 88307 TISSUE EXAM BY PATHOLOGIST: CPT | Performed by: INTERNAL MEDICINE

## 2020-07-29 PROCEDURE — 80053 COMPREHEN METABOLIC PANEL: CPT | Performed by: INTERNAL MEDICINE

## 2020-07-29 PROCEDURE — 83735 ASSAY OF MAGNESIUM: CPT | Performed by: INTERNAL MEDICINE

## 2020-07-29 PROCEDURE — 36415 COLL VENOUS BLD VENIPUNCTURE: CPT | Performed by: INTERNAL MEDICINE

## 2020-07-29 PROCEDURE — 40000166 ZZH STATISTIC PP CARE STAGE 1

## 2020-07-29 PROCEDURE — 93505 ENDOMYOCARDIAL BIOPSY: CPT | Mod: 26 | Performed by: INTERNAL MEDICINE

## 2020-07-29 PROCEDURE — 93505 ENDOMYOCARDIAL BIOPSY: CPT | Performed by: INTERNAL MEDICINE

## 2020-07-29 PROCEDURE — 80048 BASIC METABOLIC PNL TOTAL CA: CPT | Performed by: INTERNAL MEDICINE

## 2020-07-29 PROCEDURE — C1894 INTRO/SHEATH, NON-LASER: HCPCS | Performed by: INTERNAL MEDICINE

## 2020-07-29 PROCEDURE — 80197 ASSAY OF TACROLIMUS: CPT | Performed by: INTERNAL MEDICINE

## 2020-07-29 PROCEDURE — 88346 IMFLUOR 1ST 1ANTB STAIN PX: CPT | Performed by: INTERNAL MEDICINE

## 2020-07-29 PROCEDURE — 88350 IMFLUOR EA ADDL 1ANTB STN PX: CPT | Performed by: INTERNAL MEDICINE

## 2020-07-29 PROCEDURE — 99214 OFFICE O/P EST MOD 30 MIN: CPT | Mod: 95 | Performed by: INTERNAL MEDICINE

## 2020-07-29 PROCEDURE — 80076 HEPATIC FUNCTION PANEL: CPT | Performed by: INTERNAL MEDICINE

## 2020-07-29 PROCEDURE — 71250 CT THORAX DX C-: CPT

## 2020-07-29 PROCEDURE — 84100 ASSAY OF PHOSPHORUS: CPT | Performed by: INTERNAL MEDICINE

## 2020-07-29 RX ORDER — PENTAMIDINE ISETHIONATE 300 MG/300MG
300 INHALANT RESPIRATORY (INHALATION) ONCE
Status: CANCELLED | OUTPATIENT
Start: 2020-08-25

## 2020-07-29 RX ORDER — PENTAMIDINE ISETHIONATE 300 MG/300MG
300 INHALANT RESPIRATORY (INHALATION) ONCE
Status: COMPLETED | OUTPATIENT
Start: 2020-07-29 | End: 2020-07-29

## 2020-07-29 RX ORDER — ALBUTEROL SULFATE 0.83 MG/ML
2.5 SOLUTION RESPIRATORY (INHALATION) ONCE
Status: DISCONTINUED | OUTPATIENT
Start: 2020-07-29 | End: 2020-07-29 | Stop reason: HOSPADM

## 2020-07-29 RX ORDER — LIDOCAINE 40 MG/G
CREAM TOPICAL
Status: COMPLETED | OUTPATIENT
Start: 2020-07-29 | End: 2020-07-29

## 2020-07-29 RX ORDER — ALBUTEROL SULFATE 0.83 MG/ML
2.5 SOLUTION RESPIRATORY (INHALATION) ONCE
Status: CANCELLED | OUTPATIENT
Start: 2020-08-25

## 2020-07-29 RX ADMIN — PENTAMIDINE ISETHIONATE 300 MG: 300 INHALANT RESPIRATORY (INHALATION) at 16:08

## 2020-07-29 RX ADMIN — LIDOCAINE: 40 CREAM TOPICAL at 10:24

## 2020-07-29 ASSESSMENT — MIFFLIN-ST. JEOR: SCORE: 1543.4

## 2020-07-29 NOTE — PROGRESS NOTES
Pt here for RHC and biopsy. Old LVAD drive site closed, but slightly yellow in color. MD at bedside to assess. No new orders. Consent signed. Pt ready for procedure. Will be driving self home.

## 2020-07-29 NOTE — PROGRESS NOTES
Pt back from CCL s/p RHC.  VSS.  Pt alert and oriented x4.  Rt neck site F/D/I.  Pt denies any pain. Pt does not want anything to eat or drink.  Discharge instructions went over with and given to pt by previous nurse, Obdulia BATISTA RN-pt has no further questions.  1330-Pt D/C'ed to home.  Rt neck site remains F/D/I.

## 2020-07-29 NOTE — PROGRESS NOTES
Patient given 2 puffs albuterol (Proair) prior to pentamidine neb 300 mg NEbuPent/6 ml sterile water via filtered neb. Vitals pre-treatment /80, BS clear, , O2 Sats 98%, RR 12. Vitals post-treatment BS clear, , O2 Sats 100%, RR 12. No adverse effects noted.

## 2020-07-29 NOTE — IP AVS SNAPSHOT
MRN:[id number]                      After Visit Summary   7/29/2020    Mariusz Sharp    MRN: [id number]           Visit Information        Department      7/29/2020  9:25 AM [address] [facility]          Review of your medicines      UNREVIEWED medicines. Ask your doctor about these medicines       Dose / Directions   acetaminophen 325 MG tablet  Commonly known as:  TYLENOL  Used for:  Status post heart transplantation (H)      Dose:  650 mg  Take 2 tablets (650 mg) by mouth every 4 hours as needed for other (multimodal surgical pain management along with NSAIDS and opioid medication as indicated based on pain control and physical function.)  Quantity:     Refills:  0     amLODIPine 5 MG tablet  Commonly known as:  NORVASC  Used for:  Other secondary hypertension      Dose:  10 mg  Take 2 tablets (10 mg) by mouth At Bedtime  Quantity:  90 tablet  Refills:  3     aspirin 81 MG chewable tablet  Commonly known as:  ASA  Used for:  LVAD (left ventricular assist device) present (H)      Dose:  81 mg  Take 1 tablet (81 mg) by mouth daily  Quantity:  90 tablet  Refills:  3     calcium carbonate 600 mg-vitamin D 400 units 600-400 MG-UNIT per tablet  Commonly known as:  CALTRATE  Used for:  Status post heart transplantation (H)      Dose:  1 tablet  Take 1 tablet by mouth 2 times daily (with meals)  Quantity:     Refills:  0     clotrimazole 10 MG allison  Used for:  Status post heart transplantation (H)      Dose:  10 mg  Take 1 Allison (10 mg) by mouth 4 times daily  Quantity:  360 Allison  Refills:  1     hydrALAZINE 25 MG tablet  Commonly known as:  APRESOLINE  Used for:  Heart replaced by transplant (H)      Dose:  25 mg  Take 1 tablet (25 mg) by mouth 3 times daily  Quantity:  270 tablet  Refills:  3     * insulin aspart 100 UNIT/ML pen  Commonly known as:  NovoLOG PEN  Used for:  Status post heart transplantation (H)      Correction Scale - HIGH INSULIN RESISTANCE DOSING     Do Not give Correction  Insulin if Pre-Meal BG less than 140.   For Pre-Meal  - 164 give 1 unit.   For Pre-Meal  - 189 give 2 units.   For Pre-Meal  - 214 give 3 units.   For Pre-Meal  - 239 give 4 units.   For Pre-Meal  - 264 give 5 units.   For Pre-Meal  - 289 give 6 units.   For Pre-Meal  - 314 give 7 units.   For Pre-Meal  - 339 give 8 units.   For Pre-Meal  - 364 give 9 units.   For Pre-Meal BG greater than or equal to 365 give 10 units  To be given with prandial insulin, and based on pre-meal blood glucose.   Notify provider if glucose greater than or equal to 350 mg/dL after administration of correction dose. If given at mealtime, administer within 30 minutes of start of meal  Quantity:  3 mL  Refills:  0     * insulin aspart 100 UNIT/ML pen  Commonly known as:  NovoLOG PEN  Used for:  Status post heart transplantation (H)      Dose:  1-7 Units  Inject 1-7 Units Subcutaneous At Bedtime HIGH INSULIN RESISTANCE DOSING    Do Not give Bedtime Correction Insulin if BG less than 200.   For  - 224 give 1 units.   For  - 249 give 2 units.   For  - 274 give 3 units.   For  - 299 give 4 units.   For  - 324 give 5 units.   For  - 349 give 6 units.   For BG greater than or equal to 350 give 7 units.   Notify provider if glucose greater than or equal to 350 mg/dL after administration of correction dose. If given at mealtime, administer within 30 minutes of start of meal  Quantity:  3 mL  Refills:  0     * insulin aspart 100 UNIT/ML pen  Commonly known as:  NovoLOG PEN  Used for:  Status post heart transplantation (H)      1 unit / 10 gms CHO with meals,plus correction insulin. Pt uses approx 40 units in 24 hrs.  Quantity:  3 mL  Refills:  3     insulin glargine 100 UNIT/ML pen  Commonly known as:  LANTUS PEN  Used for:  Status post heart transplantation (H)      Inject 30 units subcutaneous daily.  Quantity:  15 mL  Refills:  3     magnesium oxide 400  (241.3 Mg) MG tablet  Commonly known as:  MAG-OX  Used for:  Hypomagnesemia      Dose:  400 mg  Take 1 tablet (400 mg) by mouth 2 times daily  Quantity:  90 tablet  Refills:  3     mycophenolate 500 MG tablet  Commonly known as:  GENERIC EQUIVALENT  Used for:  Status post heart transplantation (H)      TAKE THREE TABLETS BY MOUTH TWICE A DAY  Quantity:  180 tablet  Refills:  0     pantoprazole 40 MG EC tablet  Commonly known as:  PROTONIX  Used for:  Status post heart transplantation (H)      TAKE ONE TABLET BY MOUTH EVERY MORNING BEFORE BREAKFAST  Quantity:  90 tablet  Refills:  3     predniSONE 10 MG tablet  Commonly known as:  DELTASONE  Used for:  Status post heart transplantation (H)      Take 1 tablet (10 mg) by mouth every morning AND 0.5 tablets (5 mg) every evening.  Quantity:  120 tablet  Refills:  3     rosuvastatin 10 MG tablet  Commonly known as:  CRESTOR  Used for:  Status post heart transplantation (H)      TAKE ONE TABLET BY MOUTH ONCE DAILY  Quantity:  90 tablet  Refills:  3     * tacrolimus 1 MG capsule  Commonly known as:  GENERIC EQUIVALENT  Used for:  Status post heart transplantation (H)      Take 3 capsules with one 0.5mg capule (3.5 mg) by mouth every morning AND 3 capsules  (3mg) every evening.  Quantity:  150 capsule  Refills:  1     * tacrolimus 0.5 MG capsule  Commonly known as:  GENERIC EQUIVALENT  Used for:  Status post heart transplantation (H)      Take one 0.5mg capsule WITH three 1mg capsules to equal 3.5mg every morning.  Quantity:  120 capsule  Refills:  0     valGANciclovir 450 MG tablet  Commonly known as:  VALCYTE  Used for:  Status post heart transplantation (H)      TAKE ONE TABLET (450MG) BY MOUTH DAILY  Quantity:  30 tablet  Refills:  3         * This list has 5 medication(s) that are the same as other medications prescribed for you. Read the directions carefully, and ask your doctor or other care provider to review them with you.            CONTINUE these medicines which  have NOT CHANGED       Dose / Directions   Accu-Chek Denise Plus test strip  Used for:  Status post heart transplantation (H)  Generic drug:  blood glucose      Check BG 3 times daily at meals and at bedtime.  Quantity:  300 strip  Refills:  0     BD SILVANA U/F 32G X 4 MM miscellaneous  Used for:  Type 2 diabetes mellitus with hyperglycemia, with long-term current use of insulin (H)  Generic drug:  insulin pen needle      Use 3-4 daily.  Quantity:  250 each  Refills:  4     Dexcom G6  Monie  Used for:  Type 2 diabetes mellitus with hyperglycemia, with long-term current use of insulin (H)      Dose:  1 each  1 each daily  Quantity:  1 Device  Refills:  1     Dexcom G6 Sensor Misc  Used for:  Type 2 diabetes mellitus with hyperglycemia, with long-term current use of insulin (H)      Dose:  1 each  1 each every 10 days  Quantity:  9 each  Refills:  3     Dexcom G6 Transmitter Misc  Used for:  Type 2 diabetes mellitus with hyperglycemia, with long-term current use of insulin (H)      Dose:  1 each  1 each every 3 months  Quantity:  1 each  Refills:  3              Protect others around you: Learn how to safely use, store and throw away your medicines at www.disposemymeds.org.       Follow-ups after your visit       Your next 10 appointments already scheduled    Jul 29, 2020  Procedure with Momo Hunt MD  [facility], [facility],  Heart Cath Lab ([facility], [facility]) [address] [place] [phone]   The [facility] is located on the corner of [facility] and [facility] on the [facility]. It is easily accessible from virtually any point in the [facility] area, via [address] and [address].   Jul 29, 2020  2:30 PM CDT  (Arrive by 2:15 PM)  Diabetes Education with Gabby Cortez RN  [facility] Diabetes ([facility]) [address]  [address] [facility] [id number] [phone]       Jul 29, 2020  3:30 PM CDT  Pentamadine with  PFL PENT  [facility] Pulmonary Function Testing ([facility]) [address] [id number] [phone]      Jul 31, 2020  1:00 PM CDT  Telephone Visit with Jeannette Schafer PA-C  [facility] Endocrinology ([facility]) [address] [place] [phone]   [facility] Endocrinology  Note: this is not an onsite visit; there is no need to come to the facility.  Please have a list of all current medications available for appointment.      Aug 04, 2020 12:30 PM CDT  (Arrive by 12:15 PM)  Video Visit with Gabby Cortez RN  [facility] Diabetes ([facility]) [address] [place] [address] [phone]   [facility] Diabetes  Note: this is not an onsite visit; there is no need to come to the facility.  Please have a list of all current medications available for appointment.      Aug 13, 2020  8:30 AM CDT  LAB with UU LAB GOLD WAITING  [facility] ([facility]) [address] [place] [phone]   Please do not eat 10-12 hours before your appointment if you are coming in fasting for labs on lipids, cholesterol, or glucose (sugar). Does not apply to pregnant women. Water, tea and black coffee (with nothing added) is okay. Do not drink other fluids, diet soda or gum. If you have concerns about taking your medications, please send a message by clicking on Secure Messaging, Message Your Care Team.     Aug 13, 2020  9:00 AM CDT  Procedure - 2.5 hour with U2A ROOM 2  Unit 2A [facility] [place] ([facility]) [address] [facility] [address]      Aug 13, 2020  Procedure with Cardiologist MD Maddie  [facility],  Heart Cath Lab ([facility],  [facility]) [address] [facility] [address]  [phone]   The [facility] is located on the corner of [facility] [address] [facility] on the [facility]. It is easily accessible from virtually any point in the [facility] area, via [address].   Aug 13, 2020 11:30 AM CDT  Echo Complete with [id number]  [facility], Cardiac Services ([facility]) [address] [facility] [address]  [phone]   1. Please bring or wear a comfortable two-piece outfit.  2. You may eat, drink and take your normal medicines.  3. Please do not apply perfumes or lotions on the day of your exam.   4. For any questions that cannot be answered, please contact the ordering physician     Aug 26, 2020  9:30 AM CDT  LAB with JACQUIE LAB GOLD WAITING  [facility], Lab ([facility]) [address] [facility] [address]   Please do not eat 10-12 hours before your appointment if you are coming in fasting for labs on lipids, cholesterol, or glucose (sugar). Does not apply to pregnant women. Water, tea and black coffee (with nothing added) is okay. Do not drink other fluids, diet soda or gum. If you have concerns about taking your medications, please send a message by clicking on Secure Messaging, Message Your Care Team.        Care Instructions       Further instructions from your care team       [facility]                        Interventional Cardiology  Discharge Instructions   Post Right Heart Cath and Biopsy      AFTER YOU GO HOME:    DO drink plenty of fluids    DO resume your regular diet and medications unless otherwise instructed by your Primary Physician    Do Not scrub the procedure site vigorously    No lotion or powder to the puncture site for 3 days    CALL YOUR PRIMARY PHYSICIAN IF: You may resume all normal activity.  Monitor neck  site for bleeding, swelling, or voice changes. If you notice bleeding or swelling immediately apply pressure to the site and call number below to speak with Cardiology Fellow.  If you experience any changes in your breathing you should call your doctor immediately or come to the closest Emergency Department.  Do not drive yourself.    ADDITIONAL INSTRUCTIONS: Medications: You are to resume all home medications including anticoagulation therapy unless otherwise advised by your primary cardiologist or nurse coordinator.    Follow Up: Per your primary cardiology team    If you have any questions or concerns regarding your procedure site please call [phone] at anytime and ask for Cardiology Fellow on call.  They are available 24 hours a day.  You may also contact the Cardiology Clinic after hours number at [phone].                                                       Telephone Numbers [phone] Monday-Friday 8:00 am to 4:30 pm    [phone] After 4:30 pm Monday-Friday, Weekends & Holidays  Ask for Interventional Cardiologist on call. Someone is on call 24 hours/day   [facility] toll free number [phone] Monday-Friday 8:00 am to 4:30 pm   [facility] Emergency Dept [phone]                   Additional Information About Your Visit       [facility] Information    [employer] gives you secure access to your electronic health record. If you see a primary care provider, you can also send messages to your care team and make appointments. If you have questions, please call your primary care clinic.  If you do not have a primary care provider, please call [phone] and they will assist you.       Care EveryWhere ID    This is your Care EveryWhere ID. This could be used by other organizations to access your [place] medical records  [id number]       Your Vitals Were  Most recent update: 7/29/2020 10:08 AM    Blood Pressure   139/97   (BP Location: Right arm, Cuff Size: Adult Regular)          Temperature  "  98.1  F (36.7  C) (Oral)          Respirations   20          Height   1.626 m (5' 4\")          Weight   80.7 kg (178 lb)             Pulse Oximetry   98%    BMI (Body Mass Index)   30.55 kg/m           Primary Care Provider Office Phone # Fax #    Milad Fry [phone]      Equal Access to Services    [facility]: Hadii aad ku hadasho Soomaali, waaxda luqadaha, qaybta kaalmada adeegyada, waxay idiin hayaan adeeg kharash la'aan ah. So [facility] [phone].    ATENCIÓN: Si habla español, tiene a waddell disposición servicios gratuitos de asistencia lingüística. Elenitaame al [phone].    We comply with applicable federal and state civil rights laws, including the [place] Human Rights Act. We do not discriminate on the basis of race, color, creed, Jain, national origin, marital status, age, disability, sex, sexual orientation, or gender identity.       Thank you!    Thank you for choosing [facility] for your care. Our goal is always to provide you with excellent care. Hearing back from our patients is one way we can continue to improve our services. Please take a few minutes to complete the written survey that you may receive in the mail after you visit with us. Thank you!            Medication List      Medications          Morning Afternoon Evening Bedtime As Needed    Accu-Chek Denise Plus test strip  INSTRUCTIONS:  Check BG 3 times daily at meals and at bedtime.  Generic drug:  blood glucose                     BD SILVANA U/F 32G X 4 MM miscellaneous  INSTRUCTIONS:  Use 3-4 daily.  Generic drug:  insulin pen needle                     Dexcom G6  Monie  INSTRUCTIONS:  1 each daily                     Dexcom G6 Sensor Misc  INSTRUCTIONS:  1 each every 10 days                     Dexcom G6 Transmitter Misc  INSTRUCTIONS:  1 each every 3 months                       ASK your doctor about these medications          Morning Afternoon Evening Bedtime As Needed    acetaminophen 325 MG tablet  Also " known as:  TYLENOL  INSTRUCTIONS:  Take 2 tablets (650 mg) by mouth every 4 hours as needed for other (multimodal surgical pain management along with NSAIDS and opioid medication as indicated based on pain control and physical function.)                     amLODIPine 5 MG tablet  Also known as:  NORVASC  INSTRUCTIONS:  Take 2 tablets (10 mg) by mouth At Bedtime                     aspirin 81 MG chewable tablet  Also known as:  ASA  INSTRUCTIONS:  Take 1 tablet (81 mg) by mouth daily                     calcium carbonate 600 mg-vitamin D 400 units 600-400 MG-UNIT per tablet  Also known as:  CALTRATE  INSTRUCTIONS:  Take 1 tablet by mouth 2 times daily (with meals)                     clotrimazole 10 MG allison  INSTRUCTIONS:  Take 1 Allison (10 mg) by mouth 4 times daily                     hydrALAZINE 25 MG tablet  Also known as:  APRESOLINE  INSTRUCTIONS:  Take 1 tablet (25 mg) by mouth 3 times daily                     * insulin aspart 100 UNIT/ML pen  Also known as:  NovoLOG PEN  INSTRUCTIONS:  Correction Scale - HIGH INSULIN RESISTANCE DOSING     Do Not give Correction Insulin if Pre-Meal BG less than 140.   For Pre-Meal  - 164 give 1 unit.   For Pre-Meal  - 189 give 2 units.   For Pre-Meal  - 214 give 3 units.   For Pre-Meal  - 239 give 4 units.   For Pre-Meal  - 264 give 5 units.   For Pre-Meal  - 289 give 6 units.   For Pre-Meal  - 314 give 7 units.   For Pre-Meal  - 339 give 8 units.   For Pre-Meal  - 364 give 9 units.   For Pre-Meal BG greater than or equal to 365 give 10 units  To be given with prandial insulin, and based on pre-meal blood glucose.   Notify provider if glucose greater than or equal to 350 mg/dL after administration of correction dose. If given at mealtime, administer within 30 minutes of start of meal                     * insulin aspart 100 UNIT/ML pen  Also known as:  NovoLOG PEN  INSTRUCTIONS:  Inject 1-7 Units Subcutaneous At Bedtime  HIGH INSULIN RESISTANCE DOSING    Do Not give Bedtime Correction Insulin if BG less than 200.   For  - 224 give 1 units.   For  - 249 give 2 units.   For  - 274 give 3 units.   For  - 299 give 4 units.   For  - 324 give 5 units.   For  - 349 give 6 units.   For BG greater than or equal to 350 give 7 units.   Notify provider if glucose greater than or equal to 350 mg/dL after administration of correction dose. If given at mealtime, administer within 30 minutes of start of meal                     * insulin aspart 100 UNIT/ML pen  Also known as:  NovoLOG PEN  INSTRUCTIONS:  1 unit / 10 gms CHO with meals,plus correction insulin. Pt uses approx 40 units in 24 hrs.                     insulin glargine 100 UNIT/ML pen  Also known as:  LANTUS PEN  INSTRUCTIONS:  Inject 30 units subcutaneous daily.  Doctor's comments:  If Lantus is not covered by insurance, may substitute Basaglar at same dose and frequency.                       magnesium oxide 400 (241.3 Mg) MG tablet  Also known as:  MAG-OX  INSTRUCTIONS:  Take 1 tablet (400 mg) by mouth 2 times daily                     mycophenolate 500 MG tablet  Also known as:  GENERIC EQUIVALENT  INSTRUCTIONS:  TAKE THREE TABLETS BY MOUTH TWICE A DAY                     pantoprazole 40 MG EC tablet  Also known as:  PROTONIX  INSTRUCTIONS:  TAKE ONE TABLET BY MOUTH EVERY MORNING BEFORE BREAKFAST                     predniSONE 10 MG tablet  Also known as:  DELTASONE  INSTRUCTIONS:  Take 1 tablet (10 mg) by mouth every morning AND 0.5 tablets (5 mg) every evening.                     rosuvastatin 10 MG tablet  Also known as:  CRESTOR  INSTRUCTIONS:  TAKE ONE TABLET BY MOUTH ONCE DAILY                     * tacrolimus 1 MG capsule  Also known as:  GENERIC EQUIVALENT  INSTRUCTIONS:  Take 3 capsules with one 0.5mg capule (3.5 mg) by mouth every morning AND 3 capsules  (3mg) every evening.                     * tacrolimus 0.5 MG capsule  Also known as:   GENERIC EQUIVALENT  INSTRUCTIONS:  Take one 0.5mg capsule WITH three 1mg capsules to equal 3.5mg every morning.  Doctor's comments:  TXP DT 5/18/2020 (Heart) TXP Dischg DT 5/29/2020 DX Heart replaced by transplant Z94.1 [facility] ([place], [place])                     valGANciclovir 450 MG tablet  Also known as:  VALCYTE  INSTRUCTIONS:  TAKE ONE TABLET (450MG) BY MOUTH DAILY                        * This list has 5 medication(s) that are the same as other medications prescribed for you. Read the directions carefully, and ask your doctor or other care provider to review them with you.

## 2020-07-29 NOTE — PROGRESS NOTES
DIABETES EDUCATION NOTE:    Mariuszronel Sharp presents today for education related to Type 2 diabetes.    Patient is being treated with:  oral agents, diet, exercise, insulin and SMBG  He is accompanied by self    PATIENT CONCERNS RELATED TO DIABETES SELF MANAGEMENT:  Dropping too low    ASSESSMENT: type 2 diabetes, hypoglycemia unawareness    Current Diabetes Management per Patient:  Taking diabetes medications?   yes:     Diabetes Medication(s)     Insulin       insulin aspart (NOVOLOG PEN) 100 UNIT/ML pen    1 unit / 10 gms CHO with meals,plus correction insulin. Pt uses approx 40 units in 24 hrs.     insulin aspart (NOVOLOG PEN) 100 UNIT/ML pen    Correction Scale - HIGH INSULIN RESISTANCE DOSING     Do Not give Correction Insulin if Pre-Meal BG less than 140.   For Pre-Meal  - 164 give 1 unit.   For Pre-Meal  - 189 give 2 units.   For Pre-Meal  - 214 give 3 units.   For Pre-Meal  - 239 give 4 units.   For Pre-Meal  - 264 give 5 units.   For Pre-Meal  - 289 give 6 units.   For Pre-Meal  - 314 give 7 units.   For Pre-Meal  - 339 give 8 units.   For Pre-Meal  - 364 give 9 units.   For Pre-Meal BG greater than or equal to 365 give 10 units  To be given with prandial insulin, and based on pre-meal blood glucose.   Notify provider if glucose greater than or equal to 350 mg/dL after administration of correction dose. If given at mealtime, administer within 30 minutes of start of meal     insulin aspart (NOVOLOG PEN) 100 UNIT/ML pen    Inject 1-7 Units Subcutaneous At Bedtime HIGH INSULIN RESISTANCE DOSING    Do Not give Bedtime Correction Insulin if BG less than 200.   For  - 224 give 1 units.   For  - 249 give 2 units.   For  - 274 give 3 units.   For  - 299 give 4 units.   For  - 324 give 5 units.   For  - 349 give 6 units.   For BG greater than or equal to 350 give 7 units.   Notify provider if glucose greater than or equal to  350 mg/dL after administration of correction dose. If given at mealtime, administer within 30 minutes of start of meal     insulin glargine (LANTUS PEN) 100 UNIT/ML pen    Inject 30 units subcutaneous daily.        Monitoring  Patient glucose self monitoring as follows: rarely  BG meter: Contour Next One meter  BG results: using DexCom CGM      BG values are: In goal  Patient's most recent   Lab Results   Component Value Date    A1C 7.8 06/15/2020    is meeting goal of <8.0    Exercise: walks 2-3 times per day and tries to get 20,000     Nutrition:   Eats out 1-2 times per week, getting better with taking insulin with him, feels good about his ability to carb count    Breakfast: (8-9a) oatmeal  Snack:  Lunch:(12p) 1 small slice of pizza  Snack:   Dinner: (8:30 p) chicken strips meal (3 strips and few fries), medium cone  Snack:                     EDUCATION and INSTRUCTION PROVIDED AT THIS VISIT:     We reviewed Red's DexCom tracings today.  His glucose is improving using the DexCom.  He struggles with bolusing before he eats.  He will work on this behavior.  No changes with insulin doses today.    Patient-stated goal written and given to Mariusz Sharp.  Verbalized and demonstrated understanding of instructions.     PLAN:  See patient instructions  AVS printed and given to patient    FOLLOW-UP:    8/13 at 2:30 pm  Time spent with patient at today's visit was 40 minutes.      Any diabetes medication dose changes were made via the CDE Protocol and Collaborative Practice Agreement with [place] and  [place].  A copy of this encounter was provided to patient's referring provider.

## 2020-07-29 NOTE — DISCHARGE INSTRUCTIONS
[facility]                        Interventional Cardiology  Discharge Instructions   Post Right Heart Cath and Biopsy      AFTER YOU GO HOME:    DO drink plenty of fluids    DO resume your regular diet and medications unless otherwise instructed by your Primary Physician    Do Not scrub the procedure site vigorously    No lotion or powder to the puncture site for 3 days    CALL YOUR PRIMARY PHYSICIAN IF: You may resume all normal activity.  Monitor neck site for bleeding, swelling, or voice changes. If you notice bleeding or swelling immediately apply pressure to the site and call number below to speak with Cardiology Fellow.  If you experience any changes in your breathing you should call your doctor immediately or come to the closest Emergency Department.  Do not drive yourself.    ADDITIONAL INSTRUCTIONS: Medications: You are to resume all home medications including anticoagulation therapy unless otherwise advised by your primary cardiologist or nurse coordinator.    Follow Up: Per your primary cardiology team    If you have any questions or concerns regarding your procedure site please call [phone] at anytime and ask for Cardiology Fellow on call.  They are available 24 hours a day.  You may also contact the Cardiology Clinic after hours number at [phone].                                                       Telephone Numbers [phone] Monday-Friday 8:00 am to 4:30 pm    [phone] After 4:30 pm Monday-Friday, Weekends & Holidays  Ask for Interventional Cardiologist on call. Someone is on call 24 hours/day   [facility] toll free number [phone] Monday-Friday 8:00 am to 4:30 pm   [facility] Emergency Dept [phone]

## 2020-07-29 NOTE — PROGRESS NOTES
[facility]   Interventional Cardiology        Consenting/Education for Cardiac Cath Lab Procedure: Right Heart Catheterization and Cardiac Biopsy     Patient understands we would like to perform .Right Heart Catheterization and Cardiac Biopsy due to s/p OHT. This procedure will be performed by Dr. Hunt.    The patient understands the following:     Right Heart Catheterization: A fine tube (catheter) is put into the vein of the groin/neck.  It is carefully passed along until it reaches the heart and then goes up into the blood vessels of the lungs. This is done to measure a variety of pressures in your heart and can tell us how well the heart is filling and emptying, as well as monitor fluid status.  and Endomyocardial Biopsy: A catheter is placed in your neck/groin vein, a 'Biopsy forceps' or pincers at the end of the catheter are used to take the tissue samples. This is may be repeated to get enough samples. The biopsy pieces are very small (one to two millimeters).     No sedation is planned for this procedure. Patient also understands risks and complications of the procedure which include, but are not limited to bruising/swelling around the incision site, infection, bleeding, allergic reaction to local anesthetic, air embolism, arterial puncture, stroke, heart attack.       Patient verbalized understanding of risks and benefits and has elected to proceed with the procedure or procedures listed above.    CARMEN Arenas, CNP  [facility] Cardiology

## 2020-07-29 NOTE — IP AVS SNAPSHOT
[address] [address]                                    After Visit Summary   7/29/2020    Mariusz Sharp    MRN: [id number]           After Visit Summary Signature Page    I have received my discharge instructions, and my questions have been answered. I have discussed any challenges I see with this plan with the nurse or doctor.    ..........................................................................................................................................  Patient/Patient Representative Signature      ..........................................................................................................................................  Patient Representative Print Name and Relationship to Patient    ..................................................               ................................................  Date                                   Time    ..........................................................................................................................................  Reviewed by Signature/Title    ...................................................              ..............................................  Date                                               Time          22EPIC Rev 08/18

## 2020-07-29 NOTE — LETTER
7/29/2020      RE: Mariusz Sharp  [address] [facility] [address]       Dear Colleague,    Thank you for the opportunity to participate in the care of your patient, Mariusz Sharp, at the [facility]. Please see a copy of my visit note below.    Mariusz Sharp is a 59 year old male who is being evaluated via a billable video visit.      ADULT HEART TRANSPLANT     HPI:   Mr. Sharp is a 59 year old male who presents today after biopsy for routine heart transplant follow-up.  Visit was done via video telehealth due to the ongoing COVID pandemic.      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  He underwent heart transplant May 18, 2020.  Since his transplantation he has had normal graft function, with largely normal right heart catheterizations (occasionally may have elevated wedge pressures), but with normal outputs and without DSAs.  His biopsies have remained negative for significant rejection.  His postop course was complicated by some nonsustained VT, with some leukocytosis thought to be due to c acnes growing from his former LVAD site.  This was thought to be a possible contaminant but it was decided to still treat him with antibiotics for a full course.  His donor blood also grew  S anginosus and Veillonella, and this tube was also thought to be a contaminant but was also treated with a full course of antibiotics.    Patient was seen via the video monitor.  He says he is otherwise felt well but may have some cramping occasionally.  He is also had some lightheadedness occasionally especially upon standing, but has tried ingesting more fluids including V8.  He  "otherwise says he is doing quite well.  Review of systems negative as noted below.   He notes mild leg edema worse at the end of the day.  Denies orthopnea, PND, abdominal bloating. Patient denies fever, chills, night sweats, oral lesions, rashes, lightheadedness, dizziness, headaches, chest pain, palpitations, nausea, vomiting, diarrhea.  He does continue to experience occasional \"charley horse \"cramping in his calves.    PAST MEDICAL HISTORY:  Past Medical History:   Diagnosis Date     Acute kidney injury (H)      CKD (chronic kidney disease)      Coronary artery disease      Diabetes mellitus (H)      HTN (hypertension)      Hyperlipidemia      Ischemic cardiomyopathy      LV (left ventricular) mural thrombus      Paroxysmal atrial flutter (H)      RVF (right ventricular failure) (H)      Systolic heart failure (H)      Ventricular tachycardia (H)        FAMILY HISTORY:  No family history on file.    SOCIAL HISTORY:  Social History     Socioeconomic History     Marital status: Single     Spouse name: Not on file     Number of children: Not on file     Years of education: Not on file     Highest education level: Not on file   Occupational History     Not on file   Social Needs     Financial resource strain: Not on file     Food insecurity     Worry: Not on file     Inability: Not on file     Transportation needs     Medical: Not on file     Non-medical: Not on file   Tobacco Use     Smoking status: Never Smoker     Smokeless tobacco: Never Used   Substance and Sexual Activity     Alcohol use: No     Frequency: Never     Drug use: No     Sexual activity: Not on file   Lifestyle     Physical activity     Days per week: Not on file     Minutes per session: Not on file     Stress: Not on file   Relationships     Social connections     Talks on phone: Not on file     Gets together: Not on file     Attends Yazidism service: Not on file     Active member of club or organization: Not on file     Attends meetings of clubs " or organizations: Not on file     Relationship status: Not on file     Intimate partner violence     Fear of current or ex partner: Not on file     Emotionally abused: Not on file     Physically abused: Not on file     Forced sexual activity: Not on file   Other Topics Concern     Not on file   Social History Narrative     Not on file       CURRENT MEDICATIONS:  ACCU-CHEK [place]LOTTE PLUS test strip, Check BG 3 times daily at meals and at bedtime.  acetaminophen (TYLENOL) 325 MG tablet, Take 2 tablets (650 mg) by mouth every 4 hours as needed for other (multimodal surgical pain management along with NSAIDS and opioid medication as indicated based on pain control and physical function.)  amLODIPine (NORVASC) 5 MG tablet, Take 2 tablets (10 mg) by mouth At Bedtime  aspirin (ASA) 81 MG chewable tablet, Take 1 tablet (81 mg) by mouth daily  BD SILVANA U/F 32G X 4 MM insulin pen needle, Use 3-4 daily.  calcium carbonate 600 mg-vitamin D 400 units (CALTRATE) 600-400 MG-UNIT per tablet, Take 1 tablet by mouth 2 times daily (with meals)  clotrimazole 10 MG sushant, Take 1 Sushant (10 mg) by mouth 4 times daily  Continuous Blood Gluc  (DEXCOM G6 ) DON, 1 each daily  Continuous Blood Gluc Sensor (DEXCOM G6 SENSOR) MISC, 1 each every 10 days  Continuous Blood Gluc Transmit (DEXCOM G6 TRANSMITTER) MISC, 1 each every 3 months  hydrALAZINE (APRESOLINE) 25 MG tablet, Take 1 tablet (25 mg) by mouth 3 times daily  insulin aspart (NOVOLOG PEN) 100 UNIT/ML pen, 1 unit / 10 gms CHO with meals,plus correction insulin. Pt uses approx 40 units in 24 hrs.  insulin aspart (NOVOLOG PEN) 100 UNIT/ML pen, Correction Scale - HIGH INSULIN RESISTANCE DOSING     Do Not give Correction Insulin if Pre-Meal BG less than 140.   For Pre-Meal  - 164 give 1 unit.   For Pre-Meal  - 189 give 2 units.   For Pre-Meal  - 214 give 3 units.   For Pre-Meal  - 239 give 4 units.   For Pre-Meal  - 264 give 5 units.   For Pre-Meal   - 289 give 6 units.   For Pre-Meal  - 314 give 7 units.   For Pre-Meal  - 339 give 8 units.   For Pre-Meal  - 364 give 9 units.   For Pre-Meal BG greater than or equal to 365 give 10 units  To be given with prandial insulin, and based on pre-meal blood glucose.   Notify provider if glucose greater than or equal to 350 mg/dL after administration of correction dose. If given at mealtime, administer within 30 minutes of start of meal  insulin aspart (NOVOLOG PEN) 100 UNIT/ML pen, Inject 1-7 Units Subcutaneous At Bedtime HIGH INSULIN RESISTANCE DOSING    Do Not give Bedtime Correction Insulin if BG less than 200.   For  - 224 give 1 units.   For  - 249 give 2 units.   For  - 274 give 3 units.   For  - 299 give 4 units.   For  - 324 give 5 units.   For  - 349 give 6 units.   For BG greater than or equal to 350 give 7 units.   Notify provider if glucose greater than or equal to 350 mg/dL after administration of correction dose. If given at mealtime, administer within 30 minutes of start of meal  insulin glargine (LANTUS PEN) 100 UNIT/ML pen, Inject 30 units subcutaneous daily.  magnesium oxide (MAG-OX) 400 (241.3 Mg) MG tablet, Take 1 tablet (400 mg) by mouth 2 times daily  mycophenolate (GENERIC EQUIVALENT) 500 MG tablet, TAKE THREE TABLETS BY MOUTH TWICE A DAY  pantoprazole (PROTONIX) 40 MG EC tablet, TAKE ONE TABLET BY MOUTH EVERY MORNING BEFORE BREAKFAST  predniSONE (DELTASONE) 10 MG tablet, Take 1 tablet (10 mg) by mouth every morning AND 0.5 tablets (5 mg) every evening.  rosuvastatin (CRESTOR) 10 MG tablet, TAKE ONE TABLET BY MOUTH ONCE DAILY  tacrolimus (GENERIC EQUIVALENT) 0.5 MG capsule, Take one 0.5mg capsule WITH three 1mg capsules to equal 3.5mg every morning.  tacrolimus (GENERIC EQUIVALENT) 1 MG capsule, Take 3 capsules with one 0.5mg capule (3.5 mg) by mouth every morning AND 3 capsules  (3mg) every evening. (Patient taking differently: Take 3  capsules with one 0.5mg capule (3.5 mg) by mouth every morning AND 3 capsules  (3.5mg) every evening.)  valGANciclovir (VALCYTE) 450 MG tablet, TAKE ONE TABLET (450MG) BY MOUTH DAILY    [COMPLETED] lidocaine (LMX4) cream        ROS:  See HPI    EXAM:    There were no vitals taken for this visit. given virtual visit  Constitutional - WDWN, no acute distress   Eyes - no redness or discharge, nonicteric sclera  Respiratory - nonlabored breathing, able to speak in full sentences without difficulty  CV: no visible edema of visualized upper extremities.  Neurological - alert and oriented, speech fluent/appropriate  PSYCH: cooperative, affect appropriate  DERM: no rashes on visualized face/neck/upper extremities  The rest of a comprehensive physical examination is deferred due to PHE (public health emergency) video visit restrictions    Labs - reviewed with patient in clinic today:  CBC RESULTS:  Lab Results   Component Value Date    WBC 1.7 (L) 07/29/2020    RBC 3.23 (L) 07/29/2020    HGB 8.8 (L) 07/29/2020    HCT 28.9 (L) 07/29/2020    MCV 90 07/29/2020    MCH 27.2 07/29/2020    MCHC 30.4 (L) 07/29/2020    RDW 19.0 (H) 07/29/2020     07/29/2020       CMP RESULTS:  Lab Results   Component Value Date     07/29/2020    POTASSIUM 4.4 07/29/2020    CHLORIDE 106 07/29/2020    CO2 25 07/29/2020    ANIONGAP 7 07/29/2020     (H) 07/29/2020    BUN 61 (H) 07/29/2020    CR 2.26 (H) 07/29/2020    GFRESTIMATED 31 (L) 07/29/2020    GFRESTBLACK 36 (L) 07/29/2020    ARLENE 8.8 07/29/2020    BILITOTAL 0.6 07/29/2020    ALBUMIN 3.4 07/29/2020    ALKPHOS 42 07/29/2020    ALT 32 07/29/2020    AST 20 07/29/2020        INR RESULTS:  Lab Results   Component Value Date    INR 1.16 (H) 06/04/2020       LIPID RESULTS:  Lab Results   Component Value Date    CHOL 183 06/15/2020    HDL 86 06/15/2020    LDL 82 06/15/2020    TRIG 73 06/15/2020       IMMUNOSUPPRESSANT LEVELS:  Lab Results   Component Value Date    TACROL 10.3 07/29/2020     DOSTAC Not Provided 07/29/2020       No components found for: CK  Lab Results   Component Value Date    MAG 1.6 07/29/2020     Lab Results   Component Value Date    A1C 7.8 (H) 06/15/2020     Lab Results   Component Value Date    PHOS 3.9 07/29/2020     Lab Results   Component Value Date    NTBNP 404 (H) 06/26/2019     Lab Results   Component Value Date    SAITESTMET [facility] 06/22/2020    SAICELL Class I 06/22/2020    [id number] Cw:15 06/22/2020    [id number] Cw:18 06/22/2020    SAIREPCOM  06/22/2020      Test performed by modified procedure. Serum heat inactivated and tested   by a modified ([place]) protocol including fetal calf serum addition.   High-risk, mfi >3,000. Mod-risk, mfi 500-3,000.       Lab Results   Component Value Date    SAIITESTME [facility] 06/22/2020    SAIICELL Class II 06/22/2020    [id number] None 06/22/2020    [id number] None 06/22/2020    SAIIREPCOM  06/22/2020      Test performed by modified procedure. Serum heat inactivated and tested   by a modified ([place]) protocol including fetal calf serum addition.   High-risk, mfi >3,000. Mod-risk, mfi 500-3,000.       Lab Results   Component Value Date    CSPEC EDTA PLASMA 06/15/2020       Diagnostic Studies:  TTE 6/15/20  Global and regional left ventricular function is normal with an EF of 60-65%.  Right ventricular function, chamber size, wall motion, and thickness are normal.  The inferior vena cava is normal.  No pericardial effusion is present.    RHC 7/29/20  RA 5  PA 18/12/15  PCWP 12  TD 4.6/2.5    Assessment/Plan:  Mr. Sharp is a 59 year old male who presents today after biopsy for routine heart transplant follow-up.  Visit was done via video telehealth due to the ongoing COVID pandemic.      As you know, patient had a history of severe heart failure with reduced ejection fraction secondary to ischemic cardiomyopathy who had underwent HeartMate 3 LVAD placement in 2018, complicated by monomorphic VT with ICD shocks, as well as chronic kidney  disease paroxysmal atrial fibrillation and elevated prostate serum antigen.  He underwent heart transplant May 18, 2020.  Since his transplantation he has had normal graft function, with largely normal right heart catheterizations (occasionally may have elevated wedge pressures), but with normal outputs and without DSAs.  His biopsies have remained negative for significant rejection.  His postop course was complicated by some nonsustained VT, with some leukocytosis thought to be due to c acnes growing from his former LVAD site.  This was thought to be a possible contaminant but it was decided to still treat him with antibiotics for a full course.  His donor blood also grew  S anginosus and Veillonella, and this tube was also thought to be a contaminant but was also treated with a full course of antibiotics.    # Status post OHT on 5/18/20, history of ICM  # Chronic immunosuppression  * Rejection history: none.   * Recent immunosuppression changes: none  * Last biopsy: 7/29, pending  * Intolerance to medications: none     Graft function:  - Fluid status: RHC normal  - BP: at goal  - HRs: > 80     Immunosuppression:  - Received Simulect 5/18 and 5/22  - MMF 1500mg bid  - tacrolimus goal level 10-12, pending today  - prednisone per taper, currently 10/5, can come down to 5/5 after negative biopsy today     PPx:  - CAV: aspirin 81mg daily, crestor 10mg daily  - PCP: Bactrim stopped 5/27 due to hyperkalemia, pentamidine given 6/30, and 7/28, may not need another dose as he will likely be off steroids  - CMV: Valcyte 450mg daily (renally dosed), x6 months (through 11/2020)  - Thrush: Nystatin QID  - Osteoporosis: calcium/vitamin D   - GI: pantoprazole 40mg daily     Serostatus: CMV D+/R-  EBV D-/R+ Toxo D-/R-     #Hypertension  At goal on amlodipine 10 mg daily and hydralazine 25 mg tid. Continue to monitor as outpatient.     #Hypomagnesium   400 mg bid supplement.     #DM Type II  Following with  endocrinology      #Elevated PSA  Prostate MRI showed signs of BPH. He has been referred to urology.     Follow up in 2 weeks with labs in 1 week.       Jenni Ortiz MD  Section Head - Advanced Heart Failure, Transplantation and Mechanical Circulatory Support  Director - Adult Congenital [facility] Cardiovascular [facility]etics [facility]  Associate Professor of Medicine, [facility]      Please do not hesitate to contact me if you have any questions/concerns.     Sincerely,     Jenni Ortiz MD

## 2020-07-29 NOTE — PROGRESS NOTES
"Mariusz Sharp is a 59 year old male who is being evaluated via a billable video visit.      The patient has been notified of following:     \"This video visit will be conducted via a call between you and your physician/provider. We have found that certain health care needs can be provided without the need for an in-person physical exam.  This service lets us provide the care you need with a video conversation.  If a prescription is necessary we can send it directly to your pharmacy.  If lab work is needed we can place an order for that and you can then stop by our lab to have the test done at a later time.    Video visits are billed at different rates depending on your insurance coverage.  Please reach out to your insurance provider with any questions.    If during the course of the call the physician/provider feels a video visit is not appropriate, you will not be charged for this service.\"    Patient has given verbal consent for Video visit? Yes  How would you like to obtain your AVS? MyChart  If you are dropped from the video visit, the video invite should be resent to: Text to cell phone: [phone]  Will anyone else be joining your video visit? No    ADULT HEART TRANSPLANT     HPI:   Mr. Sharp is a 59 year old male who presents today after biopsy for routine heart transplant follow-up.  Visit was done via video telehealth due to the ongoing COVID pandemic.      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  He underwent heart transplant May 18, 2020.  Since his transplantation he has had normal graft function, with largely normal right heart catheterizations (occasionally may have elevated wedge pressures), but with normal " "outputs and without DSAs.  His biopsies have remained negative for significant rejection.  His postop course was complicated by some nonsustained VT, with some leukocytosis thought to be due to c acnes growing from his former LVAD site.  This was thought to be a possible contaminant but it was decided to still treat him with antibiotics for a full course.  His donor blood also grew  S anginosus and Veillonella, and this tube was also thought to be a contaminant but was also treated with a full course of antibiotics.    Patient was seen via the video monitor.  He says he is otherwise felt well but may have some cramping occasionally.  He is also had some lightheadedness occasionally especially upon standing, but has tried ingesting more fluids including V8.  He otherwise says he is doing quite well.  Review of systems negative as noted below.   He notes mild leg edema worse at the end of the day.  Denies orthopnea, PND, abdominal bloating. Patient denies fever, chills, night sweats, oral lesions, rashes, lightheadedness, dizziness, headaches, chest pain, palpitations, nausea, vomiting, diarrhea.  He does continue to experience occasional \"charley horse \"cramping in his calves.    PAST MEDICAL HISTORY:  Past Medical History:   Diagnosis Date     Acute kidney injury (H)      CKD (chronic kidney disease)      Coronary artery disease      Diabetes mellitus (H)      HTN (hypertension)      Hyperlipidemia      Ischemic cardiomyopathy      LV (left ventricular) mural thrombus      Paroxysmal atrial flutter (H)      RVF (right ventricular failure) (H)      Systolic heart failure (H)      Ventricular tachycardia (H)        FAMILY HISTORY:  No family history on file.    SOCIAL HISTORY:  Social History     Socioeconomic History     Marital status: Single     Spouse name: Not on file     Number of children: Not on file     Years of education: Not on file     Highest education level: Not on file   Occupational History     Not on " file   Social Needs     Financial resource strain: Not on file     Food insecurity     Worry: Not on file     Inability: Not on file     Transportation needs     Medical: Not on file     Non-medical: Not on file   Tobacco Use     Smoking status: Never Smoker     Smokeless tobacco: Never Used   Substance and Sexual Activity     Alcohol use: No     Frequency: Never     Drug use: No     Sexual activity: Not on file   Lifestyle     Physical activity     Days per week: Not on file     Minutes per session: Not on file     Stress: Not on file   Relationships     Social connections     Talks on phone: Not on file     Gets together: Not on file     Attends Yazdanism service: Not on file     Active member of club or organization: Not on file     Attends meetings of clubs or organizations: Not on file     Relationship status: Not on file     Intimate partner violence     Fear of current or ex partner: Not on file     Emotionally abused: Not on file     Physically abused: Not on file     Forced sexual activity: Not on file   Other Topics Concern     Not on file   Social History Narrative     Not on file       CURRENT MEDICATIONS:  ACCU-CHEK [place]TE PLUS test strip, Check BG 3 times daily at meals and at bedtime.  acetaminophen (TYLENOL) 325 MG tablet, Take 2 tablets (650 mg) by mouth every 4 hours as needed for other (multimodal surgical pain management along with NSAIDS and opioid medication as indicated based on pain control and physical function.)  amLODIPine (NORVASC) 5 MG tablet, Take 2 tablets (10 mg) by mouth At Bedtime  aspirin (ASA) 81 MG chewable tablet, Take 1 tablet (81 mg) by mouth daily  BD SILVANA U/F 32G X 4 MM insulin pen needle, Use 3-4 daily.  calcium carbonate 600 mg-vitamin D 400 units (CALTRATE) 600-400 MG-UNIT per tablet, Take 1 tablet by mouth 2 times daily (with meals)  clotrimazole 10 MG sushant, Take 1 Sushant (10 mg) by mouth 4 times daily  Continuous Blood Gluc  (DEXCOM G6 ) DON, 1 each  daily  Continuous Blood Gluc Sensor (DEXCOM G6 SENSOR) MISC, 1 each every 10 days  Continuous Blood Gluc Transmit (DEXCOM G6 TRANSMITTER) MISC, 1 each every 3 months  hydrALAZINE (APRESOLINE) 25 MG tablet, Take 1 tablet (25 mg) by mouth 3 times daily  insulin aspart (NOVOLOG PEN) 100 UNIT/ML pen, 1 unit / 10 gms CHO with meals,plus correction insulin. Pt uses approx 40 units in 24 hrs.  insulin aspart (NOVOLOG PEN) 100 UNIT/ML pen, Correction Scale - HIGH INSULIN RESISTANCE DOSING     Do Not give Correction Insulin if Pre-Meal BG less than 140.   For Pre-Meal  - 164 give 1 unit.   For Pre-Meal  - 189 give 2 units.   For Pre-Meal  - 214 give 3 units.   For Pre-Meal  - 239 give 4 units.   For Pre-Meal  - 264 give 5 units.   For Pre-Meal  - 289 give 6 units.   For Pre-Meal  - 314 give 7 units.   For Pre-Meal  - 339 give 8 units.   For Pre-Meal  - 364 give 9 units.   For Pre-Meal BG greater than or equal to 365 give 10 units  To be given with prandial insulin, and based on pre-meal blood glucose.   Notify provider if glucose greater than or equal to 350 mg/dL after administration of correction dose. If given at mealtime, administer within 30 minutes of start of meal  insulin aspart (NOVOLOG PEN) 100 UNIT/ML pen, Inject 1-7 Units Subcutaneous At Bedtime HIGH INSULIN RESISTANCE DOSING    Do Not give Bedtime Correction Insulin if BG less than 200.   For  - 224 give 1 units.   For  - 249 give 2 units.   For  - 274 give 3 units.   For  - 299 give 4 units.   For  - 324 give 5 units.   For  - 349 give 6 units.   For BG greater than or equal to 350 give 7 units.   Notify provider if glucose greater than or equal to 350 mg/dL after administration of correction dose. If given at mealtime, administer within 30 minutes of start of meal  insulin glargine (LANTUS PEN) 100 UNIT/ML pen, Inject 30 units subcutaneous daily.  magnesium oxide (MAG-OX)  400 (241.3 Mg) MG tablet, Take 1 tablet (400 mg) by mouth 2 times daily  mycophenolate (GENERIC EQUIVALENT) 500 MG tablet, TAKE THREE TABLETS BY MOUTH TWICE A DAY  pantoprazole (PROTONIX) 40 MG EC tablet, TAKE ONE TABLET BY MOUTH EVERY MORNING BEFORE BREAKFAST  predniSONE (DELTASONE) 10 MG tablet, Take 1 tablet (10 mg) by mouth every morning AND 0.5 tablets (5 mg) every evening.  rosuvastatin (CRESTOR) 10 MG tablet, TAKE ONE TABLET BY MOUTH ONCE DAILY  tacrolimus (GENERIC EQUIVALENT) 0.5 MG capsule, Take one 0.5mg capsule WITH three 1mg capsules to equal 3.5mg every morning.  tacrolimus (GENERIC EQUIVALENT) 1 MG capsule, Take 3 capsules with one 0.5mg capule (3.5 mg) by mouth every morning AND 3 capsules  (3mg) every evening. (Patient taking differently: Take 3 capsules with one 0.5mg capule (3.5 mg) by mouth every morning AND 3 capsules  (3.5mg) every evening.)  valGANciclovir (VALCYTE) 450 MG tablet, TAKE ONE TABLET (450MG) BY MOUTH DAILY    [COMPLETED] lidocaine (LMX4) cream        ROS:  See HPI    EXAM:    There were no vitals taken for this visit. given virtual visit  Constitutional - WDWN, no acute distress   Eyes - no redness or discharge, nonicteric sclera  Respiratory - nonlabored breathing, able to speak in full sentences without difficulty  CV: no visible edema of visualized upper extremities.  Neurological - alert and oriented, speech fluent/appropriate  PSYCH: cooperative, affect appropriate  DERM: no rashes on visualized face/neck/upper extremities  The rest of a comprehensive physical examination is deferred due to PHE (public health emergency) video visit restrictions    Labs - reviewed with patient in clinic today:  CBC RESULTS:  Lab Results   Component Value Date    WBC 1.7 (L) 07/29/2020    RBC 3.23 (L) 07/29/2020    HGB 8.8 (L) 07/29/2020    HCT 28.9 (L) 07/29/2020    MCV 90 07/29/2020    MCH 27.2 07/29/2020    MCHC 30.4 (L) 07/29/2020    RDW 19.0 (H) 07/29/2020     07/29/2020       CMP  RESULTS:  Lab Results   Component Value Date     07/29/2020    POTASSIUM 4.4 07/29/2020    CHLORIDE 106 07/29/2020    CO2 25 07/29/2020    ANIONGAP 7 07/29/2020     (H) 07/29/2020    BUN 61 (H) 07/29/2020    CR 2.26 (H) 07/29/2020    GFRESTIMATED 31 (L) 07/29/2020    GFRESTBLACK 36 (L) 07/29/2020    ARLENE 8.8 07/29/2020    BILITOTAL 0.6 07/29/2020    ALBUMIN 3.4 07/29/2020    ALKPHOS 42 07/29/2020    ALT 32 07/29/2020    AST 20 07/29/2020        INR RESULTS:  Lab Results   Component Value Date    INR 1.16 (H) 06/04/2020       LIPID RESULTS:  Lab Results   Component Value Date    CHOL 183 06/15/2020    HDL 86 06/15/2020    LDL 82 06/15/2020    TRIG 73 06/15/2020       IMMUNOSUPPRESSANT LEVELS:  Lab Results   Component Value Date    TACROL 10.3 07/29/2020    DOSTAC Not Provided 07/29/2020       No components found for: CK  Lab Results   Component Value Date    MAG 1.6 07/29/2020     Lab Results   Component Value Date    A1C 7.8 (H) 06/15/2020     Lab Results   Component Value Date    PHOS 3.9 07/29/2020     Lab Results   Component Value Date    NTBNP 404 (H) 06/26/2019     Lab Results   Component Value Date    SAITESTMET [facility] 06/22/2020    SAICELL Class I 06/22/2020    [id number] Cw:15 06/22/2020    [id number] Cw:18 06/22/2020    SAIREPCOM  06/22/2020      Test performed by modified procedure. Serum heat inactivated and tested   by a modified (Yellow Jacket) protocol including fetal calf serum addition.   High-risk, mfi >3,000. Mod-risk, mfi 500-3,000.       Lab Results   Component Value Date    SAIITESTME [facility] 06/22/2020    SAIICELL Class II 06/22/2020    OO4BSTSES None 06/22/2020    [id number] None 06/22/2020    SAIIREPCOM  06/22/2020      Test performed by modified procedure. Serum heat inactivated and tested   by a modified (Yellow Jacket) protocol including fetal calf serum addition.   High-risk, mfi >3,000. Mod-risk, mfi 500-3,000.       Lab Results   Component Value Date    [facility] EDTA PLASMA 06/15/2020        Diagnostic Studies:  TTE 6/15/20  Global and regional left ventricular function is normal with an EF of 60-65%.  Right ventricular function, chamber size, wall motion, and thickness are normal.  The inferior vena cava is normal.  No pericardial effusion is present.    RHC 7/29/20  RA 5  PA 18/12/15  PCWP 12  TD 4.6/2.5    Assessment/Plan:  Mr. Sharp is a 59 year old male who presents today after biopsy for routine heart transplant follow-up.  Visit was done via video telehealth due to the ongoing COVID pandemic.      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  He underwent heart transplant May 18, 2020.  Since his transplantation he has had normal graft function, with largely normal right heart catheterizations (occasionally may have elevated wedge pressures), but with normal outputs and without DSAs.  His biopsies have remained negative for significant rejection.  His postop course was complicated by some nonsustained VT, with some leukocytosis thought to be due to c acnes growing from his former LVAD site.  This was thought to be a possible contaminant but it was decided to still treat him with antibiotics for a full course.  His donor blood also grew  S anginosus and Veillonella, and this tube was also thought to be a contaminant but was also treated with a full course of antibiotics.    # Status post OHT on 5/18/20, history of ICM  # Chronic immunosuppression  * Rejection history: none.   * Recent immunosuppression changes: none  * Last biopsy: 7/29, pending  * Intolerance to medications: none     Graft function:  - Fluid status: RHC normal  - BP: at goal  - HRs: > 80     Immunosuppression:  - Received Simulect 5/18 and  5/22  - MMF 1500mg bid  - tacrolimus goal level 10-12, pending today  - prednisone per taper, currently 10/5, can come down to 5/5 after negative biopsy today     PPx:  - CAV: aspirin 81mg daily, crestor 10mg daily  - PCP: Bactrim stopped 5/27 due to hyperkalemia, pentamidine given 6/30, and 7/28, may not need another dose as he will likely be off steroids  - CMV: Valcyte 450mg daily (renally dosed), x6 months (through 11/2020)  - Thrush: Nystatin QID  - Osteoporosis: calcium/vitamin D   - GI: pantoprazole 40mg daily     Serostatus: CMV D+/R-  EBV D-/R+ Toxo D-/R-     #Hypertension  At goal on amlodipine 10 mg daily and hydralazine 25 mg tid. Continue to monitor as outpatient.     #Hypomagnesium   400 mg bid supplement.     #DM Type II  Following with endocrinology      #Elevated PSA  Prostate MRI showed signs of BPH. He has been referred to urology.     Follow up in 2 weeks with labs in 1 week.       Jenni Ortiz MD  Section Head - Advanced Heart Failure, Transplantation and Mechanical Circulatory Support  Director - Adult [facility]en[facility]ovascular [facility]  Associate Professor of Medicine, [facility]        Video-Visit Details  Type of service:  Video Visit  Video Start Time: 9:53 am  Video End Time (time video stopped): 10:05 am  Originating Location (pt. Location): Home  Distant Location (provider location):  [facility]   Mode of Communication:  Video Conference via Melty

## 2020-07-29 NOTE — TELEPHONE ENCOUNTER
Returned pt's call regarding Pentamadine treatments.  Pt is wondering why he needs them if he is not allergic to Sulfa drugs.  Writer explained to pt that he is on Pentamadine instead of Bactrim because the Bactrim caused his potassium to rise.  Pt states understanding.  Also reviewed resulted labs with pt.    Tacrolimus level 10.1.  Goal 10-12.  No dose changes at this time.  WBC 1.7- result routed to Dr. Ortiz for review.  Plan to all pt with all remaining labs/testing when resulted.  Pt states understanding.

## 2020-07-29 NOTE — TELEPHONE ENCOUNTER
Patient Call: Voicemail  Date/Time: 7/29/20 357pm  Reason for call: Requested a call back from Angelika, would like to know why he needs Pentamidine treatments, someone told him you only need these if you have a sulfa allergy and he has no allergies he is aware of.

## 2020-07-29 NOTE — PATIENT INSTRUCTIONS
1.  Try to give your Novolog 10-15 minutes before you eat.    2. Return 8/13 at 2:30 pm    Gabby Cortez RN,CDE  [id number] [place] [address]  Phone: [phone]  [email or web address]

## 2020-07-30 DIAGNOSIS — Z94.1 HEART REPLACED BY TRANSPLANT (H): Primary | ICD-10-CM

## 2020-07-30 LAB — COPATH REPORT: NORMAL

## 2020-07-30 NOTE — PROGRESS NOTES
"Mariusz Sharp is a 59 year old male who is being evaluated via a billable telephone visit.      The patient has been notified of following:     \"This telephone visit will be conducted via a call between you and your physician/provider. We have found that certain health care needs can be provided without the need for a physical exam.  This service lets us provide the care you need with a short phone conversation.  If a prescription is necessary we can send it directly to your pharmacy.  If lab work is needed we can place an order for that and you can then stop by our lab to have the test done at a later time.    Telephone visits are billed at different rates depending on your insurance coverage. During this emergency period, for some insurers they may be billed the same as an in-person visit.  Please reach out to your insurance provider with any questions.    If during the course of the call the physician/provider feels a telephone visit is not appropriate, you will not be charged for this service.\"    Patient has given verbal consent for Telephone visit?  Yes    What phone number would you like to be contacted at? [phone]    How would you like to obtain your AVS? Lucius    earache    Patients Glucose Data was Sent via Email      Due to the COVID 19 pandemic this visit was converted to a telephone visit in order to help prevent spread of infection in this patient and the general population.    Time of start: 1:05 pm  Time of end: 1: 20 pm  Total duration of telephone visit: 15 minutes.    CHANELL Sharp ( Pepe ) is a 59 year old male with type 2 diabetes mellitus.  Telephone visit today for diabetes care.  Pt was admitted 5/17/2020-5/29/2020 and underwent a heart transplant on 5/18/2020.  Red reports he continues to do well.   Pt's hx is significant for type 2 diabetes mellitus dx 3 years ago, HTN, hyperlipidemia, hx of ischemic heart disease s/p STEMI with ALYSSA 2019, LVAD placement, Stage 3 CKD, and " obesity.  Apparently heart donor blood cx and respiratory cultures were + for H flu, staph and strep.  Red was also admitted 5/12-5/15/2020 for ANDREW due to nephrolithiasis complicated by hydronephrosis/ANDREW.  Post heart transplant he received high dose steroids and he is currently on a prednisone taper 15 mg each am and 10 mg each pm.  For his diabetes, pt is currently taking Lantus 30 units subcutaneous each pm and Novolog 1 unit/10 gms CHO with meals with correction scale  ( 1 unit/25 for BG > 140 ) and bedtime correction scale ( 1 unit/25 for BG > 200).  His prednisone dose has been tapered to 5 mg in am and 5 mg pm.  Pt's A1C was 7.8 % on 6/15/2020 and his A1C was 8.0 % on 5/12/2020 and his A1C was 9.5 % on 7/5/2019. Pt is anemic.  I reviewed pt's Dexcom sensor downloaded today and his average glucose is 180 with SD 63 and estimated A1C of 7.6 %.  His glucose is in target 56 % of the time, above target 43 % of the time and below target 1 % of the time.  His blood sugar values have been above target overnight and postprandial.  On ROS today, doing well.   Pt denies headaches, blurred vision, n/v, SOB at rest,cough, fever, chills, abd pain, diarrhea,blood in the stool, melena, dysuria, hematuria or numbness in his feet or hands.  Red denies foot ulcers at this time.    Diabetes Care  Retinopathy:none; pt seen by Oph in Dec 2019.  Nephropathy:yes- hx of CKD/ANDREW.  Neuropathy: none.  Foot Exam:no exam today.  Taking aspirin: yes.  Lipids: LDL 82 on 6/15/2020. Pt is taking Crestor.  CAD: yes; s/p heart transplant on 5/18/2020.  Depression: no.  Insulin: Basal and meal time insulin with correction scale ( 1 unit/25 for BG > 140 with meals and > 200 at bedtime).  Testing: DexcomG6 sensor.    ROS  See under HPI.    Allergies  No Known Allergies    Medications  Current Outpatient Medications   Medication Sig Dispense Refill     ACCU-CHEK [place] PLUS test strip Check BG 3 times daily at meals and at bedtime. 300 strip 0      acetaminophen (TYLENOL) 325 MG tablet Take 2 tablets (650 mg) by mouth every 4 hours as needed for other (multimodal surgical pain management along with NSAIDS and opioid medication as indicated based on pain control and physical function.)       amLODIPine (NORVASC) 5 MG tablet Take 2 tablets (10 mg) by mouth At Bedtime 90 tablet 3     aspirin (ASA) 81 MG chewable tablet Take 1 tablet (81 mg) by mouth daily 90 tablet 3     BD SILVANA U/F 32G X 4 MM insulin pen needle Use 3-4 daily. 250 each 4     calcium carbonate 600 mg-vitamin D 400 units (CALTRATE) 600-400 MG-UNIT per tablet Take 1 tablet by mouth 2 times daily (with meals)       clotrimazole 10 MG allison Take 1 Allison (10 mg) by mouth 4 times daily 360 Allison 1     Continuous Blood Gluc  (DEXCOM G6 ) DON 1 each daily 1 Device 1     Continuous Blood Gluc Sensor (DEXCOM G6 SENSOR) MISC 1 each every 10 days 9 each 3     Continuous Blood Gluc Transmit (DEXCOM G6 TRANSMITTER) MISC 1 each every 3 months 1 each 3     hydrALAZINE (APRESOLINE) 25 MG tablet Take 1 tablet (25 mg) by mouth 3 times daily 270 tablet 3     insulin aspart (NOVOLOG PEN) 100 UNIT/ML pen 1 unit / 10 gms CHO with meals,plus correction insulin. Pt uses approx 40 units in 24 hrs. 3 mL 3     insulin aspart (NOVOLOG PEN) 100 UNIT/ML pen Correction Scale - HIGH INSULIN RESISTANCE DOSING     Do Not give Correction Insulin if Pre-Meal BG less than 140.   For Pre-Meal  - 164 give 1 unit.   For Pre-Meal  - 189 give 2 units.   For Pre-Meal  - 214 give 3 units.   For Pre-Meal  - 239 give 4 units.   For Pre-Meal  - 264 give 5 units.   For Pre-Meal  - 289 give 6 units.   For Pre-Meal  - 314 give 7 units.   For Pre-Meal  - 339 give 8 units.   For Pre-Meal  - 364 give 9 units.   For Pre-Meal BG greater than or equal to 365 give 10 units  To be given with prandial insulin, and based on pre-meal blood glucose.   Notify provider if glucose greater  than or equal to 350 mg/dL after administration of correction dose. If given at mealtime, administer within 30 minutes of start of meal 3 mL 0     insulin aspart (NOVOLOG PEN) 100 UNIT/ML pen Inject 1-7 Units Subcutaneous At Bedtime HIGH INSULIN RESISTANCE DOSING    Do Not give Bedtime Correction Insulin if BG less than 200.   For  - 224 give 1 units.   For  - 249 give 2 units.   For  - 274 give 3 units.   For  - 299 give 4 units.   For  - 324 give 5 units.   For  - 349 give 6 units.   For BG greater than or equal to 350 give 7 units.   Notify provider if glucose greater than or equal to 350 mg/dL after administration of correction dose. If given at mealtime, administer within 30 minutes of start of meal 3 mL 0     insulin glargine (LANTUS PEN) 100 UNIT/ML pen Inject 30 units subcutaneous daily. 15 mL 3     magnesium oxide (MAG-OX) 400 (241.3 Mg) MG tablet Take 1 tablet (400 mg) by mouth 2 times daily 90 tablet 3     mycophenolate (GENERIC EQUIVALENT) 500 MG tablet TAKE THREE TABLETS BY MOUTH TWICE A  tablet 0     pantoprazole (PROTONIX) 40 MG EC tablet TAKE ONE TABLET BY MOUTH EVERY MORNING BEFORE BREAKFAST 90 tablet 3     rosuvastatin (CRESTOR) 10 MG tablet TAKE ONE TABLET BY MOUTH ONCE DAILY 90 tablet 3     tacrolimus (GENERIC EQUIVALENT) 0.5 MG capsule Take one 0.5mg capsule WITH three 1mg capsules to equal 3.5mg every morning. 120 capsule 0     tacrolimus (GENERIC EQUIVALENT) 1 MG capsule Take 3 capsules with one 0.5mg capule (3.5 mg) by mouth every morning AND 3 capsules  (3mg) every evening. (Patient taking differently: Take 3 capsules with one 0.5mg capule (3.5 mg) by mouth every morning AND 3 capsules  (3.5mg) every evening.) 150 capsule 1     valGANciclovir (VALCYTE) 450 MG tablet TAKE ONE TABLET (450MG) BY MOUTH DAILY 30 tablet 3     predniSONE (DELTASONE) 10 MG tablet Take 0.5 tablets (5 mg) by mouth every morning AND 0.5 tablets (5 mg) every evening. 120 tablet  3       Family History  Mother and father with hx of type 2 DM.  Social History   reports that he has never smoked. He has never used smokeless tobacco. He reports that he does not drink alcohol or use drugs.     Past Medical History  Past Medical History:   Diagnosis Date     Acute kidney injury (H)      CKD (chronic kidney disease)      Coronary artery disease      Diabetes mellitus (H)      HTN (hypertension)      Hyperlipidemia      Ischemic cardiomyopathy      LV (left ventricular) mural thrombus      Paroxysmal atrial flutter (H)      RVF (right ventricular failure) (H)      Systolic heart failure (H)      Ventricular tachycardia (H)        Past Surgical History:   Procedure Laterality Date     COLONOSCOPY N/A 12/7/2018    Procedure: COLONOSCOPY;  Surgeon: Krish Mercado MD;  Location: [facility] OR     CV HEART BIOPSY N/A 5/24/2020    Procedure: Heart Biopsy;  Surgeon: Kit Bacon MD;  Location: [facility]     CV HEART BIOPSY N/A 6/1/2020    Procedure: CV HEART BIOPSY;  Surgeon: Kit Bacon MD;  Location: [facility]     CV HEART BIOPSY N/A 6/8/2020    Procedure: CV HEART BIOPSY;  Surgeon: Kit Bacon MD;  Location: [facility]     CV HEART BIOPSY N/A 6/15/2020    Procedure: CV HEART BIOPSY;  Surgeon: Kit Bacon MD;  Location: [facility]     CV HEART BIOPSY N/A 6/30/2020    Procedure: CV HEART BIOPSY;  Surgeon: Kit Bacon MD;  Location: [facility]     CV HEART BIOPSY N/A 7/14/2020    Procedure: CV HEART BIOPSY;  Surgeon: Brian Long MD;  [facility]     CV RIGHT HEART CATH N/A 2/19/2019    Procedure: RHC;  Surgeon: Brian Long MD;  Location:  HEART CARDIAC CATH LAB     CV RIGHT HEART CATH N/A 7/5/2019    Procedure: CV RIGHT HEART CATH;  Surgeon: Khris Mcclelland MD;  [facility]      CV RIGHT HEART CATH N/A 5/24/2020    Procedure: Right Heart Cath;  Surgeon: Kit Bacon MD;  Location:  HE[facility] CARDIAC CATH LAB     CV RIGHT HEART CATH N/A 6/1/2020    Procedure: CV RIGHT HEART CATH;  Surgeon: Kit Bacon MD;  Location:  HEART CARDIAC CATH LAB     CV RIGHT HEART CATH N/A 6/8/2020    Procedure: CV RIGHT HEART CATH;  Surgeon: Kit Bacon MD;  Location:  HEART CARDIAC CATH LAB     CV RIGHT HEART CATH N/A 6/15/2020    Procedure: CV RIGHT HEART CATH;  Surgeon: Kit Bacon MD;  Location:  HEART CARDIAC CATH LAB     CV RIGHT HEART CATH N/A 6/30/2020    Procedure: CV RIGHT HEART CATH;  Surgeon: Kit Bacon MD;  Location:  HEART CARDIAC CATH LAB     CV RIGHT HEART CATH N/A 7/14/2020    Procedure: CV RIGHT HEART CATH;  Surgeon: Brian Long MD;  Location:  HEART CARDIAC CATH LAB     HC PRQ [id number] CORONARY ANGIOPLASTY ADDL BRANCH      PCI and ALYSSA to ostial LAD on 6/27/18     IMPLANT AUTOMATIC IMPLANTABLE CARDIOVERTER DEFIBRILLATOR       INSERT VENTRICULAR ASSIST DEVICE LEFT (HEARTMATE II) N/A 12/31/2018    Procedure: Median Sternotomy, On Cardiopulmonary Bypass Pump, Insertion of Left Ventricular Assist Device (Heartmate III);  Surgeon: Kamaljit Baker MD;  Location: [facility] OR     PICC DOUBLE LUMEN PLACEMENT Right 05/22/2020    5FR DL PICC inserted at right basilic vein, length 38cm.     TRANSPLANT HEART RECIPIENT AFTER HEART MATE N/A 5/18/2020    Procedure: REDO MEDIAN STERNOTOMY, LYSIS OF ADHESIONS, ON CARDIOPULMONARY BYPASS PUMP, HEART TRANSPLANT RECIPIENT, REMOVAL OF LEFT VENTRICULAR ASSIST DEVICE, REMOVAL OF AUTOMATED IMPLANTABLE CARDIOVERTER DEFIBRILLATOR;  Surgeon: Elder Willis MD;  Location:  OR       Physical Exam    No exam today.    RESULTS  Creatinine   Date Value Ref Range Status   07/29/2020 2.26 (H) 0.66 - 1.25 mg/dL Final     GFR Estimate   Date Value Ref Range Status    07/29/2020 31 (L) >60 mL/min/[1.73_m2] Final     Comment:     Non  GFR Calc  Starting 12/18/2018, serum creatinine based estimated GFR (eGFR) will be   calculated using the Chronic Kidney Disease Epidemiology Collaboration   (CKD-EPI) equation.       Hemoglobin A1C   Date Value Ref Range Status   06/15/2020 7.8 (H) 0 - 5.6 % Final     Comment:     Normal <5.7% Prediabetes 5.7-6.4%  Diabetes 6.5% or higher - adopted from ADA   consensus guidelines.       Potassium   Date Value Ref Range Status   07/29/2020 4.4 3.4 - 5.3 mmol/L Final     ALT   Date Value Ref Range Status   07/29/2020 32 0 - 70 U/L Final     AST   Date Value Ref Range Status   07/29/2020 20 0 - 45 U/L Final     TSH   Date Value Ref Range Status   06/26/2019 3.94 0.40 - 4.00 mU/L Final     T4 Free   Date Value Ref Range Status   07/23/2018 1.05 0.76 - 1.46 ng/dL Final       Cholesterol   Date Value Ref Range Status   06/15/2020 183 <200 mg/dL Final   08/06/2018 126 <200 mg/dL Final     HDL Cholesterol   Date Value Ref Range Status   06/15/2020 86 >39 mg/dL Final   08/06/2018 52 >39 mg/dL Final     LDL Cholesterol Calculated   Date Value Ref Range Status   06/15/2020 82 <100 mg/dL Final     Comment:     Desirable:       <100 mg/dl   08/06/2018 24 <100 mg/dL Final     Comment:     Desirable:       <100 mg/dl     Triglycerides   Date Value Ref Range Status   06/15/2020 73 <150 mg/dL Final   08/06/2018 246 (H) <150 mg/dL Final     Comment:     Borderline high:  150-199 mg/dl  High:             200-499 mg/dl  Very high:       >499 mg/dl       A1C   7.8 % on  6/15/2020    ASSESSMENT/PLAN:      1.  TYPE 2 DIABETES MELLITUS: Type 2 diabetes mellitus complicated by hx of CAD and ischemic cardiomyopathy s/p heart transplant on 5/18/2020. Pt is doing well at this time and remains on a prednisone taper 5 mg each am and 5 mg each pm.  Red's overnight and postprandial blood sugars are above target.  Pt instructed to increase his Lantus 32 units at  hs and change Novolog I/C ratio to 1:8 with current correction scale ( 1 unit/25 for BG > 140 ).  Days of cardiac rehab, he was instructed to take 1/2 Novolog dose the meal prior to his rehab to help prevent hypoglycemia.  He is to wear his DexcomG6 sensor full time.  Pt was seen by Oph in Dec 2019 without retinopathy.  He denies any sx of neuropathy or foot ulcers at this time.  He is taking ASA daily.    2.  CKD/ANDREW: Most recent creat was 2.26 with GFR 31 mL/min on 729/2020.    3.  HX OF ISCHEMIC CARDIOMYOPATHY: S/P heart transplant on 5/18/2020 and followed closely by transplant staff here.     4.  FOLLOW UP : with me in 1 month.  He is to call if he has any questions regarding his diabetes care.

## 2020-07-31 ENCOUNTER — TELEPHONE (OUTPATIENT)
Dept: TRANSPLANT | Facility: CLINIC | Age: 60
End: 2020-07-31

## 2020-07-31 ENCOUNTER — VIRTUAL VISIT (OUTPATIENT)
Dept: ENDOCRINOLOGY | Facility: CLINIC | Age: 60
End: 2020-07-31
Payer: COMMERCIAL

## 2020-07-31 DIAGNOSIS — Z94.1 STATUS POST HEART TRANSPLANTATION (H): ICD-10-CM

## 2020-07-31 DIAGNOSIS — Z79.4 TYPE 2 DIABETES MELLITUS WITH HYPERGLYCEMIA, WITH LONG-TERM CURRENT USE OF INSULIN (H): Primary | ICD-10-CM

## 2020-07-31 DIAGNOSIS — E11.65 TYPE 2 DIABETES MELLITUS WITH HYPERGLYCEMIA, WITH LONG-TERM CURRENT USE OF INSULIN (H): Primary | ICD-10-CM

## 2020-07-31 RX ORDER — PREDNISONE 10 MG/1
TABLET ORAL
Qty: 120 TABLET | Refills: 3 | Status: SHIPPED | OUTPATIENT
Start: 2020-07-31 | End: 2020-08-17

## 2020-07-31 NOTE — LETTER
"7/31/2020       RE: Mariusz Sharp  [address] [facility] [address]     Dear Colleague,    Thank you for referring your patient, Mariusz Sharp, to the [facility] [place] ENDOCRINOLOGY at [facility]. Please see a copy of my visit note below.    Mariusz Sharp is a 59 year old male who is being evaluated via a billable telephone visit.      The patient has been notified of following:     \"This telephone visit will be conducted via a call between you and your physician/provider. We have found that certain health care needs can be provided without the need for a physical exam.  This service lets us provide the care you need with a short phone conversation.  If a prescription is necessary we can send it directly to your pharmacy.  If lab work is needed we can place an order for that and you can then stop by our lab to have the test done at a later time.    Telephone visits are billed at different rates depending on your insurance coverage. During this emergency period, for some insurers they may be billed the same as an in-person visit.  Please reach out to your insurance provider with any questions.    If during the course of the call the physician/provider feels a telephone visit is not appropriate, you will not be charged for this service.\"    Patient has given verbal consent for Telephone visit?  Yes    What phone number would you like to be contacted at? [phone]    How would you like to obtain your AVS? Lucius    earache    Patients Glucose Data was Sent via Email      Due to the COVID 19 pandemic this visit was converted to a telephone visit in order to help prevent spread of infection in this patient and the general population.    Time of start: 1:05 pm  Time of end: 1: 20 pm  Total duration of telephone visit: 15 minutes.    HPI  Mariusz ( Red ) ISABEL Sharp is a 59 year old male with type 2 diabetes mellitus.  Telephone visit today for diabetes care.  Pt was admitted " 5/17/2020-5/29/2020 and underwent a heart transplant on 5/18/2020.  Red reports he continues to do well.   Pt's hx is significant for type 2 diabetes mellitus dx 3 years ago, HTN, hyperlipidemia, hx of ischemic heart disease s/p STEMI with ALYSSA 2019, LVAD placement, Stage 3 CKD, and obesity.  Apparently heart donor blood cx and respiratory cultures were + for H flu, staph and strep.  Red was also admitted 5/12-5/15/2020 for ANDREW due to nephrolithiasis complicated by hydronephrosis/ANDREW.  Post heart transplant he received high dose steroids and he is currently on a prednisone taper 15 mg each am and 10 mg each pm.  For his diabetes, pt is currently taking Lantus 30 units subcutaneous each pm and Novolog 1 unit/10 gms CHO with meals with correction scale  ( 1 unit/25 for BG > 140 ) and bedtime correction scale ( 1 unit/25 for BG > 200).  His prednisone dose has been tapered to 5 mg in am and 5 mg pm.  Pt's A1C was 7.8 % on 6/15/2020 and his A1C was 8.0 % on 5/12/2020 and his A1C was 9.5 % on 7/5/2019. Pt is anemic.  I reviewed pt's Dexcom sensor downloaded today and his average glucose is 180 with SD 63 and estimated A1C of 7.6 %.  His glucose is in target 56 % of the time, above target 43 % of the time and below target 1 % of the time.  His blood sugar values have been above target overnight and postprandial.  On ROS today, doing well.   Pt denies headaches, blurred vision, n/v, SOB at rest,cough, fever, chills, abd pain, diarrhea,blood in the stool, melena, dysuria, hematuria or numbness in his feet or hands.  Red denies foot ulcers at this time.    Diabetes Care  Retinopathy:none; pt seen by Oph in Dec 2019.  Nephropathy:yes- hx of CKD/ANDREW.  Neuropathy: none.  Foot Exam:no exam today.  Taking aspirin: yes.  Lipids: LDL 82 on 6/15/2020. Pt is taking Crestor.  CAD: yes; s/p heart transplant on 5/18/2020.  Depression: no.  Insulin: Basal and meal time insulin with correction scale ( 1 unit/25 for BG > 140 with meals  and > 200 at bedtime).  Testing: DexcomG6 sensor.    ROS  See under HPI.    Allergies  No Known Allergies    Medications  Current Outpatient Medications   Medication Sig Dispense Refill     ACCU-CHEK CHARLOTTE PLUS test strip Check BG 3 times daily at meals and at bedtime. 300 strip 0     acetaminophen (TYLENOL) 325 MG tablet Take 2 tablets (650 mg) by mouth every 4 hours as needed for other (multimodal surgical pain management along with NSAIDS and opioid medication as indicated based on pain control and physical function.)       amLODIPine (NORVASC) 5 MG tablet Take 2 tablets (10 mg) by mouth At Bedtime 90 tablet 3     aspirin (ASA) 81 MG chewable tablet Take 1 tablet (81 mg) by mouth daily 90 tablet 3     BD SILVANA U/F 32G X 4 MM insulin pen needle Use 3-4 daily. 250 each 4     calcium carbonate 600 mg-vitamin D 400 units (CALTRATE) 600-400 MG-UNIT per tablet Take 1 tablet by mouth 2 times daily (with meals)       clotrimazole 10 MG sushant Take 1 Sushant (10 mg) by mouth 4 times daily 360 Sushant 1     Continuous Blood Gluc  (DEXCOM G6 ) DON 1 each daily 1 Device 1     Continuous Blood Gluc Sensor (DEXCOM G6 SENSOR) MISC 1 each every 10 days 9 each 3     Continuous Blood Gluc Transmit (DEXCOM G6 TRANSMITTER) MISC 1 each every 3 months 1 each 3     hydrALAZINE (APRESOLINE) 25 MG tablet Take 1 tablet (25 mg) by mouth 3 times daily 270 tablet 3     insulin aspart (NOVOLOG PEN) 100 UNIT/ML pen 1 unit / 10 gms CHO with meals,plus correction insulin. Pt uses approx 40 units in 24 hrs. 3 mL 3     insulin aspart (NOVOLOG PEN) 100 UNIT/ML pen Correction Scale - HIGH INSULIN RESISTANCE DOSING     Do Not give Correction Insulin if Pre-Meal BG less than 140.   For Pre-Meal  - 164 give 1 unit.   For Pre-Meal  - 189 give 2 units.   For Pre-Meal  - 214 give 3 units.   For Pre-Meal  - 239 give 4 units.   For Pre-Meal  - 264 give 5 units.   For Pre-Meal  - 289 give 6 units.   For  Pre-Meal  - 314 give 7 units.   For Pre-Meal  - 339 give 8 units.   For Pre-Meal  - 364 give 9 units.   For Pre-Meal BG greater than or equal to 365 give 10 units  To be given with prandial insulin, and based on pre-meal blood glucose.   Notify provider if glucose greater than or equal to 350 mg/dL after administration of correction dose. If given at mealtime, administer within 30 minutes of start of meal 3 mL 0     insulin aspart (NOVOLOG PEN) 100 UNIT/ML pen Inject 1-7 Units Subcutaneous At Bedtime HIGH INSULIN RESISTANCE DOSING    Do Not give Bedtime Correction Insulin if BG less than 200.   For  - 224 give 1 units.   For  - 249 give 2 units.   For  - 274 give 3 units.   For  - 299 give 4 units.   For  - 324 give 5 units.   For  - 349 give 6 units.   For BG greater than or equal to 350 give 7 units.   Notify provider if glucose greater than or equal to 350 mg/dL after administration of correction dose. If given at mealtime, administer within 30 minutes of start of meal 3 mL 0     insulin glargine (LANTUS PEN) 100 UNIT/ML pen Inject 30 units subcutaneous daily. 15 mL 3     magnesium oxide (MAG-OX) 400 (241.3 Mg) MG tablet Take 1 tablet (400 mg) by mouth 2 times daily 90 tablet 3     mycophenolate (GENERIC EQUIVALENT) 500 MG tablet TAKE THREE TABLETS BY MOUTH TWICE A  tablet 0     pantoprazole (PROTONIX) 40 MG EC tablet TAKE ONE TABLET BY MOUTH EVERY MORNING BEFORE BREAKFAST 90 tablet 3     rosuvastatin (CRESTOR) 10 MG tablet TAKE ONE TABLET BY MOUTH ONCE DAILY 90 tablet 3     tacrolimus (GENERIC EQUIVALENT) 0.5 MG capsule Take one 0.5mg capsule WITH three 1mg capsules to equal 3.5mg every morning. 120 capsule 0     tacrolimus (GENERIC EQUIVALENT) 1 MG capsule Take 3 capsules with one 0.5mg capule (3.5 mg) by mouth every morning AND 3 capsules  (3mg) every evening. (Patient taking differently: Take 3 capsules with one 0.5mg capule (3.5 mg) by mouth every  morning AND 3 capsules  (3.5mg) every evening.) 150 capsule 1     valGANciclovir (VALCYTE) 450 MG tablet TAKE ONE TABLET (450MG) BY MOUTH DAILY 30 tablet 3     predniSONE (DELTASONE) 10 MG tablet Take 0.5 tablets (5 mg) by mouth every morning AND 0.5 tablets (5 mg) every evening. 120 tablet 3       Family History  Mother and father with hx of type 2 DM.  Social History   reports that he has never smoked. He has never used smokeless tobacco. He reports that he does not drink alcohol or use drugs.     Past Medical History  Past Medical History:   Diagnosis Date     Acute kidney injury (H)      CKD (chronic kidney disease)      Coronary artery disease      Diabetes mellitus (H)      HTN (hypertension)      Hyperlipidemia      Ischemic cardiomyopathy      LV (left ventricular) mural thrombus      Paroxysmal atrial flutter (H)      RVF (right ventricular failure) (H)      Systolic heart failure (H)      Ventricular tachycardia (H)        Past Surgical History:   Procedure Laterality Date     COLONOSCOPY N/A 12/7/2018    Procedure: COLONOSCOPY;  Surgeon: Krish Mercado MD;  Location: [facility]     CV HEART BIOPSY N/A 5/24/2020    Procedure: Heart Biopsy;  Surgeon: Kit Bacon MD;  Location:  HEART CARDIAC CATH LAB     CV HEART BIOPSY N/A 6/1/2020    Procedure: CV HEART BIOPSY;  Surgeon: Kit Bacon MD;  Location:  HEART CARDIAC CATH LAB     CV HEART BIOPSY N/A 6/8/2020    Procedure: CV HEART BIOPSY;  Surgeon: Kit Bacon MD;  Location:  HEART CARDIAC CATH LAB     CV HEART BIOPSY N/A 6/15/2020    Procedure: CV HEART BIOPSY;  Surgeon: Kit Bacon MD;  Location:  HEART CARDIAC CATH LAB     CV HEART BIOPSY N/A 6/30/2020    Procedure: CV HEART BIOPSY;  Surgeon: Kit Bacon MD;  Location: [facility] CARDIAC CATH LAB     CV HEART BIOPSY N/A 7/14/2020    Procedure: CV HEART BIOPSY;  Surgeon: Brian Long MD;  Location: [facility]  CARDIAC CATH LAB     CV RIGHT HEART CATH N/A 2/19/2019    Procedure: RHC;  Surgeon: Brian Long MD;  Location:  HEART CARDIAC CATH LAB     CV RIGHT HEART CATH N/A 7/5/2019    Procedure: CV RIGHT HEART CATH;  Surgeon: Khris Mcclelland MD;  Location:  HEART CARDIAC CATH LAB     CV RIGHT HEART CATH N/A 5/24/2020    Procedure: Right Heart Cath;  Surgeon: Kit Bacon MD;  Location:  HEART CARDIAC CATH LAB     CV RIGHT HEART CATH N/A 6/1/2020    Procedure: CV RIGHT HEART CATH;  Surgeon: Kit Bacon MD;  Location:  HEART CARDIAC CATH LAB     CV RIGHT HEART CATH N/A 6/8/2020    Procedure: CV RIGHT HEART CATH;  Surgeon: Kit Bacon MD;  Location:  HEART CARDIAC CATH LAB     CV RIGHT HEART CATH N/A 6/15/2020    Procedure: CV RIGHT HEART CATH;  Surgeon: Kit Bacon MD;  Location:  HEART CARDIAC CATH LAB     CV RIGHT HEART CATH N/A 6/30/2020    Procedure: CV RIGHT HEART CATH;  Surgeon: Kit Bacon MD;  Location:  HEART CARDIAC CATH LAB     CV RIGHT HEART CATH N/A 7/14/2020    Procedure: CV RIGHT HEART CATH;  Surgeon: Brian Long MD;  Location: [facility] CARDIAC CATH LAB     HC PRQ TRLUML CORONARY ANGIOPLASTY ADDL BRANCH      PCI and ALYSSA to ostial LAD on 6/27/18     IMPLANT AUTOMATIC IMPLANTABLE CARDIOVERTER DEFIBRILLATOR       INSERT VENTRICULAR ASSIST DEVICE LEFT (HEARTMATE II) N/A 12/31/2018    Procedure: Median Sternotomy, On Cardiopulmonary Bypass Pump, Insertion of Left Ventricular Assist Device (Heartmate III);  Surgeon: Kamaljit Baker MD;  [facility]     PICC DOUBLE LUMEN PLACEMENT Right 05/22/2020    5FR DL PICC inserted at right basilic vein, length 38cm.     TRANSPLANT HEART RECIPIENT AFTER HEART MATE N/A 5/18/2020    Procedure: REDO MEDIAN STERNOTOMY, LYSIS OF ADHESIONS, ON CARDIOPULMONARY BYPASS PUMP, HEART TRANSPLANT RECIPIENT, REMOVAL OF LEFT VENTRICULAR ASSIST DEVICE, REMOVAL OF  AUTOMATED IMPLANTABLE CARDIOVERTER DEFIBRILLATOR;  Surgeon: Elder Willis MD;  Location: [facility] OR       Physical Exam    No exam today.    RESULTS  Creatinine   Date Value Ref Range Status   07/29/2020 2.26 (H) 0.66 - 1.25 mg/dL Final     GFR Estimate   Date Value Ref Range Status   07/29/2020 31 (L) >60 mL/min/[1.73_m2] Final     Comment:     Non  GFR Calc  Starting 12/18/2018, serum creatinine based estimated GFR (eGFR) will be   calculated using the Chronic Kidney Disease Epidemiology Collaboration   (CKD-EPI) equation.       Hemoglobin A1C   Date Value Ref Range Status   06/15/2020 7.8 (H) 0 - 5.6 % Final     Comment:     Normal <5.7% Prediabetes 5.7-6.4%  Diabetes 6.5% or higher - adopted from ADA   consensus guidelines.       Potassium   Date Value Ref Range Status   07/29/2020 4.4 3.4 - 5.3 mmol/L Final     ALT   Date Value Ref Range Status   07/29/2020 32 0 - 70 U/L Final     AST   Date Value Ref Range Status   07/29/2020 20 0 - 45 U/L Final     TSH   Date Value Ref Range Status   06/26/2019 3.94 0.40 - 4.00 mU/L Final     T4 Free   Date Value Ref Range Status   07/23/2018 1.05 0.76 - 1.46 ng/dL Final       Cholesterol   Date Value Ref Range Status   06/15/2020 183 <200 mg/dL Final   08/06/2018 126 <200 mg/dL Final     HDL Cholesterol   Date Value Ref Range Status   06/15/2020 86 >39 mg/dL Final   08/06/2018 52 >39 mg/dL Final     LDL Cholesterol Calculated   Date Value Ref Range Status   06/15/2020 82 <100 mg/dL Final     Comment:     Desirable:       <100 mg/dl   08/06/2018 24 <100 mg/dL Final     Comment:     Desirable:       <100 mg/dl     Triglycerides   Date Value Ref Range Status   06/15/2020 73 <150 mg/dL Final   08/06/2018 246 (H) <150 mg/dL Final     Comment:     Borderline high:  150-199 mg/dl  High:             200-499 mg/dl  Very high:       >499 mg/dl       A1C   7.8 % on  6/15/2020    ASSESSMENT/PLAN:      1.  TYPE 2 DIABETES MELLITUS: Type 2 diabetes mellitus  complicated by hx of CAD and ischemic cardiomyopathy s/p heart transplant on 5/18/2020. Pt is doing well at this time and remains on a prednisone taper 5 mg each am and 5 mg each pm.  Red's overnight and postprandial blood sugars are above target.  Pt instructed to increase his Lantus 32 units at hs and change Novolog I/C ratio to 1:8 with current correction scale ( 1 unit/25 for BG > 140 ).  Days of cardiac rehab, he was instructed to take 1/2 Novolog dose the meal prior to his rehab to help prevent hypoglycemia.  He is to wear his DexcomG6 sensor full time.  Pt was seen by Oph in Dec 2019 without retinopathy.  He denies any sx of neuropathy or foot ulcers at this time.  He is taking ASA daily.    2.  CKD/ANDREW: Most recent creat was 2.26 with GFR 31 mL/min on 729/2020.    3.  HX OF ISCHEMIC CARDIOMYOPATHY: S/P heart transplant on 5/18/2020 and followed closely by transplant staff here.     4.  FOLLOW UP : with me in 1 month.  He is to call if he has any questions regarding his diabetes care.    Again, thank you for allowing me to participate in the care of your patient.      Sincerely,    Jeannette Schafre PA-C

## 2020-07-31 NOTE — TELEPHONE ENCOUNTER
Pt called with testing results from 7/29/20 biopsy.  Pt with negative heart biopsy.  Pt to decrease Prednisone to 5mg BID.  Pt to work on increasing fluid intake and have labs rechecked next week in [place]- orders sent.  Pt states understanding plan of care and instructions.

## 2020-08-03 DIAGNOSIS — Z94.1 STATUS POST HEART TRANSPLANTATION (H): ICD-10-CM

## 2020-08-03 RX ORDER — TACROLIMUS 1 MG/1
CAPSULE ORAL
Qty: 180 CAPSULE | Refills: 11 | Status: SHIPPED | OUTPATIENT
Start: 2020-08-03 | End: 2020-08-14

## 2020-08-03 RX ORDER — MYCOPHENOLATE MOFETIL 500 MG/1
TABLET ORAL
Qty: 180 TABLET | Refills: 11 | Status: ON HOLD | OUTPATIENT
Start: 2020-08-03 | End: 2020-08-21

## 2020-08-05 DIAGNOSIS — Z94.1 HEART REPLACED BY TRANSPLANT (H): ICD-10-CM

## 2020-08-05 PROCEDURE — 80197 ASSAY OF TACROLIMUS: CPT | Performed by: INTERNAL MEDICINE

## 2020-08-06 ENCOUNTER — PRE VISIT (OUTPATIENT)
Dept: TRANSPLANT | Facility: CLINIC | Age: 60
End: 2020-08-06

## 2020-08-06 ENCOUNTER — TELEPHONE (OUTPATIENT)
Dept: TRANSPLANT | Facility: CLINIC | Age: 60
End: 2020-08-06

## 2020-08-06 ENCOUNTER — TELEPHONE (OUTPATIENT)
Dept: PHARMACY | Facility: CLINIC | Age: 60
End: 2020-08-06

## 2020-08-06 DIAGNOSIS — Z94.1 STATUS POST HEART TRANSPLANTATION (H): Primary | ICD-10-CM

## 2020-08-06 DIAGNOSIS — Z94.1 HEART REPLACED BY TRANSPLANT (H): Primary | ICD-10-CM

## 2020-08-06 RX ORDER — LIDOCAINE 40 MG/G
CREAM TOPICAL
Status: CANCELLED | OUTPATIENT
Start: 2020-08-06

## 2020-08-06 NOTE — TELEPHONE ENCOUNTER
Clinical Pharmacy Consult:                                                      Transplant Specific: 1 Month Post Transplant Pharmacy Medication Review  Date of Transplant: 5/18/2020  Type of Transplant: heart  First Transplant: yes  History of rejection: no    Immunosuppression Regimen   TAC 3.5mg qAM & 3mg qPM, Prednisone 5mg qAM & 5mg qPM and MMF 1500mg qAM & 1500mg qPM  Patient specific goal: 10-12  Most recent level: 10.3, date 7/29/2020  Immunosuppressant Levels:  Therapeutic  Pt adherent to lab draws: yes  Scr:   Lab Results   Component Value Date    CR 1.75 06/30/2020     Side effects: Tremors, teeth are sensitive to cold, may be from whitening tooth paste    Prophylactic Medications  Antibacterial:  Inhaled Pentamidine 300mg q 4 weeks  Scheduled Discontinue Date: 6 months    Antifungal: Clotrimazole   Scheduled Discontinue Date: until off steroid    Antiviral: CrCl 40 to 59 mL/minute: Valcyte 450 mg once daily   Scheduled Discontinue Date: 6 months    Acid Reducer: Protonix (pantoprazole)  Scheduled Reviewed Date: until off steroid    Thrombosis Prevention: Aspirin 81 mg PO daily      Blood Pressure Management  Frequency of home Blood Pressure checks: twice daily  Most recent home BP: 128/94 it is much lower at rehab  Patient Blood pressure goal: <140/90  Patient blood pressure at goal:  yes  Hospitalizations/ER visits since last assessment: 0     Med rec/DUR performed: yes  Med Rec Discrepancies: no    Medication adherence flowsheet 8/6/2020   Patient medication administration: Responsible for own medications   Patient estimated adherence level: %   Pharmacist assessment of adherence: Excellent   Patient reported doses missed per week: 0-1   Pharmacy MPR: -   Facilitators to medication adherence  Cell phone;Medication dosing chart;Pill box;Schedule/routine   Patient reported barriers to medication adherence  -   Adherence intervention(s): -      Medication access flowsheet 8/6/2020   Number of  pharmacies used: 2   Pharmacy: [facility];Other   Other pharmacy: local retail for otc   Enrolled in [facility] pharmacy? Yes   Patient reported barriers to accessing medications: -   Medication access interventions: -            Red reports feeling good. He still has tremors and his teeth are sensitive to cold.   This may be from whitening tooth paste.  He stopped the tooth paste 2 weeks ago and they are still sensitive.   He does not use the med card, he uses Mychart.  Told him it is not always the most current dose especially with tacro.  He said he knows.  He checks his blood pressure twice a day.   This morning it was 128/94, last night diastolic was 85.  He said it is always much lower when he goes to rehab.    Shukri Felton [facility]  Specialty Pharmacist [phone]

## 2020-08-07 NOTE — TELEPHONE ENCOUNTER
Called pt to confirm he had labs on 8/5/20.  He did have labs drawn in [place].  Called [facility].  WBC still low at 2.3.  Creat improved to 1.89.  Message sent to Dr. Ortiz with above results- awaiting reply.  Requested labs be faxed to us.

## 2020-08-09 LAB
TACROLIMUS BLD-MCNC: 9.9 UG/L (ref 5–15)
TME LAST DOSE: NORMAL H

## 2020-08-10 ENCOUNTER — TELEPHONE (OUTPATIENT)
Dept: TRANSPLANT | Facility: CLINIC | Age: 60
End: 2020-08-10

## 2020-08-10 ENCOUNTER — PRE VISIT (OUTPATIENT)
Dept: UROLOGY | Facility: CLINIC | Age: 60
End: 2020-08-10

## 2020-08-10 NOTE — TELEPHONE ENCOUNTER
CBC with WBC of 2.3. Creatinine 1.89.   FK level 9.9, goal of 10-12. A good 12 hour trough.  Current dose 3.5mg AM and 3mg PM--no dose change.     Pt verbalized understanding.

## 2020-08-10 NOTE — TELEPHONE ENCOUNTER
[facility] follow up- discuss surgical options.  Former Dr. Ibarra patient.  Records available in [employer].

## 2020-08-11 ENCOUNTER — TELEPHONE (OUTPATIENT)
Dept: TRANSPLANT | Facility: CLINIC | Age: 60
End: 2020-08-11

## 2020-08-11 NOTE — TELEPHONE ENCOUNTER
Pt wondering if the coordinator sent out the [employer] form to his work  They stated they have not received it yet

## 2020-08-11 NOTE — TELEPHONE ENCOUNTER
Spoke with Katie at pt's disability provider.  H and P and updated MD note faxed 2 weeks ago by writer.  Katie states they did not receive documents.  Will re-fax to Katie as requested.

## 2020-08-12 ENCOUNTER — TELEPHONE (OUTPATIENT)
Dept: CARDIOLOGY | Facility: CLINIC | Age: 60
End: 2020-08-12

## 2020-08-13 ENCOUNTER — RESULTS ONLY (OUTPATIENT)
Dept: OTHER | Facility: CLINIC | Age: 60
End: 2020-08-13

## 2020-08-13 ENCOUNTER — APPOINTMENT (OUTPATIENT)
Dept: LAB | Facility: CLINIC | Age: 60
End: 2020-08-13
Payer: COMMERCIAL

## 2020-08-13 ENCOUNTER — APPOINTMENT (OUTPATIENT)
Dept: MEDSURG UNIT | Facility: CLINIC | Age: 60
End: 2020-08-13
Payer: COMMERCIAL

## 2020-08-13 ENCOUNTER — TELEPHONE (OUTPATIENT)
Dept: ENDOCRINOLOGY | Facility: CLINIC | Age: 60
End: 2020-08-13

## 2020-08-13 ENCOUNTER — HOSPITAL ENCOUNTER (OUTPATIENT)
Dept: CARDIOLOGY | Facility: CLINIC | Age: 60
End: 2020-08-13
Attending: INTERNAL MEDICINE
Payer: COMMERCIAL

## 2020-08-13 ENCOUNTER — HOSPITAL ENCOUNTER (OUTPATIENT)
Facility: CLINIC | Age: 60
Discharge: HOME | End: 2020-08-13
Attending: INTERNAL MEDICINE | Admitting: INTERNAL MEDICINE
Payer: COMMERCIAL

## 2020-08-13 ENCOUNTER — ALLIED HEALTH/NURSE VISIT (OUTPATIENT)
Dept: EDUCATION SERVICES | Facility: CLINIC | Age: 60
End: 2020-08-13
Payer: COMMERCIAL

## 2020-08-13 VITALS
TEMPERATURE: 97.9 F | SYSTOLIC BLOOD PRESSURE: 148 MMHG | DIASTOLIC BLOOD PRESSURE: 98 MMHG | RESPIRATION RATE: 16 BRPM | OXYGEN SATURATION: 97 %

## 2020-08-13 DIAGNOSIS — E11.65 TYPE 2 DIABETES MELLITUS WITH HYPERGLYCEMIA, WITH LONG-TERM CURRENT USE OF INSULIN (H): ICD-10-CM

## 2020-08-13 DIAGNOSIS — Z79.4 TYPE 2 DIABETES MELLITUS WITH HYPERGLYCEMIA, WITH LONG-TERM CURRENT USE OF INSULIN (H): ICD-10-CM

## 2020-08-13 DIAGNOSIS — Z94.1 HEART REPLACED BY TRANSPLANT (H): ICD-10-CM

## 2020-08-13 DIAGNOSIS — E11.65 TYPE 2 DIABETES MELLITUS WITH HYPERGLYCEMIA, WITH LONG-TERM CURRENT USE OF INSULIN (H): Primary | ICD-10-CM

## 2020-08-13 DIAGNOSIS — Z79.4 TYPE 2 DIABETES MELLITUS WITH HYPERGLYCEMIA, WITH LONG-TERM CURRENT USE OF INSULIN (H): Primary | ICD-10-CM

## 2020-08-13 DIAGNOSIS — Z94.1 STATUS POST HEART TRANSPLANTATION (H): ICD-10-CM

## 2020-08-13 LAB
ALBUMIN SERPL-MCNC: 3.1 G/DL (ref 3.4–5)
ALP SERPL-CCNC: 55 U/L (ref 40–150)
ALT SERPL W P-5'-P-CCNC: 20 U/L (ref 0–70)
ANION GAP SERPL CALCULATED.3IONS-SCNC: 5 MMOL/L (ref 3–14)
ANISOCYTOSIS BLD QL SMEAR: SLIGHT
AST SERPL W P-5'-P-CCNC: 18 U/L (ref 0–45)
BASOPHILS # BLD AUTO: 0 10E9/L (ref 0–0.2)
BASOPHILS NFR BLD AUTO: 0 %
BILIRUB SERPL-MCNC: 0.4 MG/DL (ref 0.2–1.3)
BUN SERPL-MCNC: 42 MG/DL (ref 7–30)
CALCIUM SERPL-MCNC: 8.7 MG/DL (ref 8.5–10.1)
CHLORIDE SERPL-SCNC: 111 MMOL/L (ref 94–109)
CO2 SERPL-SCNC: 25 MMOL/L (ref 20–32)
CREAT SERPL-MCNC: 1.76 MG/DL (ref 0.66–1.25)
DIFFERENTIAL METHOD BLD: ABNORMAL
EOSINOPHIL # BLD AUTO: 0 10E9/L (ref 0–0.7)
EOSINOPHIL NFR BLD AUTO: 0 %
ERYTHROCYTE [DISTWIDTH] IN BLOOD BY AUTOMATED COUNT: 18.3 % (ref 10–15)
GFR SERPL CREATININE-BSD FRML MDRD: 42 ML/MIN/{1.73_M2}
GLUCOSE SERPL-MCNC: 147 MG/DL (ref 70–99)
HBA1C MFR BLD: 7.2 % (ref 0–5.6)
HCT VFR BLD AUTO: 29.6 % (ref 40–53)
HGB BLD-MCNC: 8.9 G/DL (ref 13.3–17.7)
LYMPHOCYTES # BLD AUTO: 0.5 10E9/L (ref 0.8–5.3)
LYMPHOCYTES NFR BLD AUTO: 22.4 %
MAGNESIUM SERPL-MCNC: 1.6 MG/DL (ref 1.6–2.3)
MCH RBC QN AUTO: 27.7 PG (ref 26.5–33)
MCHC RBC AUTO-ENTMCNC: 30.1 G/DL (ref 31.5–36.5)
MCV RBC AUTO: 92 FL (ref 78–100)
MONOCYTES # BLD AUTO: 0.2 10E9/L (ref 0–1.3)
MONOCYTES NFR BLD AUTO: 7.8 %
NEUTROPHILS # BLD AUTO: 1.7 10E9/L (ref 1.6–8.3)
NEUTROPHILS NFR BLD AUTO: 69.8 %
NRBC # BLD AUTO: 0 10*3/UL
NRBC BLD AUTO-RTO: 1 /100
PHOSPHATE SERPL-MCNC: 3.4 MG/DL (ref 2.5–4.5)
PLATELET # BLD AUTO: 295 10E9/L (ref 150–450)
PLATELET # BLD EST: ABNORMAL 10*3/UL
POIKILOCYTOSIS BLD QL SMEAR: SLIGHT
POTASSIUM SERPL-SCNC: 4.2 MMOL/L (ref 3.4–5.3)
PROT SERPL-MCNC: 6.6 G/DL (ref 6.8–8.8)
RBC # BLD AUTO: 3.21 10E12/L (ref 4.4–5.9)
SODIUM SERPL-SCNC: 141 MMOL/L (ref 133–144)
TACROLIMUS BLD-MCNC: 6.4 UG/L (ref 5–15)
TME LAST DOSE: NORMAL H
WBC # BLD AUTO: 2.4 10E9/L (ref 4–11)

## 2020-08-13 PROCEDURE — 84100 ASSAY OF PHOSPHORUS: CPT | Performed by: INTERNAL MEDICINE

## 2020-08-13 PROCEDURE — 93505 ENDOMYOCARDIAL BIOPSY: CPT | Mod: 26 | Performed by: INTERNAL MEDICINE

## 2020-08-13 PROCEDURE — 80197 ASSAY OF TACROLIMUS: CPT | Performed by: INTERNAL MEDICINE

## 2020-08-13 PROCEDURE — 40000166 ZZH STATISTIC PP CARE STAGE 1

## 2020-08-13 PROCEDURE — 93306 TTE W/DOPPLER COMPLETE: CPT | Mod: 26 | Performed by: INTERNAL MEDICINE

## 2020-08-13 PROCEDURE — 40000065 ZZH STATISTIC EKG NON-CHARGEABLE

## 2020-08-13 PROCEDURE — 83735 ASSAY OF MAGNESIUM: CPT | Performed by: INTERNAL MEDICINE

## 2020-08-13 PROCEDURE — 25000125 ZZHC RX 250: Performed by: INTERNAL MEDICINE

## 2020-08-13 PROCEDURE — 93306 TTE W/DOPPLER COMPLETE: CPT

## 2020-08-13 PROCEDURE — 88346 IMFLUOR 1ST 1ANTB STAIN PX: CPT | Performed by: INTERNAL MEDICINE

## 2020-08-13 PROCEDURE — 87799 DETECT AGENT NOS DNA QUANT: CPT | Performed by: INTERNAL MEDICINE

## 2020-08-13 PROCEDURE — 99214 OFFICE O/P EST MOD 30 MIN: CPT | Mod: 25 | Performed by: NURSE PRACTITIONER

## 2020-08-13 PROCEDURE — 86832 HLA CLASS I HIGH DEFIN QUAL: CPT | Performed by: INTERNAL MEDICINE

## 2020-08-13 PROCEDURE — 27210794 ZZH OR GENERAL SUPPLY STERILE: Performed by: INTERNAL MEDICINE

## 2020-08-13 PROCEDURE — 85027 COMPLETE CBC AUTOMATED: CPT | Performed by: INTERNAL MEDICINE

## 2020-08-13 PROCEDURE — 93505 ENDOMYOCARDIAL BIOPSY: CPT | Performed by: INTERNAL MEDICINE

## 2020-08-13 PROCEDURE — 93010 ELECTROCARDIOGRAM REPORT: CPT | Performed by: INTERNAL MEDICINE

## 2020-08-13 PROCEDURE — 88350 IMFLUOR EA ADDL 1ANTB STN PX: CPT | Performed by: INTERNAL MEDICINE

## 2020-08-13 PROCEDURE — 93005 ELECTROCARDIOGRAM TRACING: CPT

## 2020-08-13 PROCEDURE — 83036 HEMOGLOBIN GLYCOSYLATED A1C: CPT | Performed by: INTERNAL MEDICINE

## 2020-08-13 PROCEDURE — 88307 TISSUE EXAM BY PATHOLOGIST: CPT | Performed by: INTERNAL MEDICINE

## 2020-08-13 PROCEDURE — 86352 CELL FUNCTION ASSAY W/STIM: CPT | Performed by: INTERNAL MEDICINE

## 2020-08-13 PROCEDURE — 86833 HLA CLASS II HIGH DEFIN QUAL: CPT | Performed by: INTERNAL MEDICINE

## 2020-08-13 PROCEDURE — 85004 AUTOMATED DIFF WBC COUNT: CPT | Performed by: INTERNAL MEDICINE

## 2020-08-13 PROCEDURE — 80053 COMPREHEN METABOLIC PANEL: CPT | Performed by: INTERNAL MEDICINE

## 2020-08-13 PROCEDURE — 36415 COLL VENOUS BLD VENIPUNCTURE: CPT | Performed by: INTERNAL MEDICINE

## 2020-08-13 RX ORDER — HYDRALAZINE HYDROCHLORIDE 25 MG/1
50 TABLET, FILM COATED ORAL 3 TIMES DAILY
Qty: 270 TABLET | Refills: 3 | Status: SHIPPED | OUTPATIENT
Start: 2020-08-13 | End: 2020-08-18

## 2020-08-13 RX ORDER — LIDOCAINE 40 MG/G
CREAM TOPICAL
Status: COMPLETED | OUTPATIENT
Start: 2020-08-13 | End: 2020-08-13

## 2020-08-13 RX ADMIN — LIDOCAINE: 40 CREAM TOPICAL at 08:51

## 2020-08-13 NOTE — IP AVS SNAPSHOT
[address] [address]                                    After Visit Summary   8/13/2020    Mariusz Sharp    MRN: [id number]           After Visit Summary Signature Page    I have received my discharge instructions, and my questions have been answered. I have discussed any challenges I see with this plan with the nurse or doctor.    ..........................................................................................................................................  Patient/Patient Representative Signature      ..........................................................................................................................................  Patient Representative Print Name and Relationship to Patient    ..................................................               ................................................  Date                                   Time    ..........................................................................................................................................  Reviewed by Signature/Title    ...................................................              ..............................................  Date                                               Time          22EPIC Rev 08/18

## 2020-08-13 NOTE — PROGRESS NOTES
ADULT HEART TRANSPLANT     HPI:   Mr. Sharp is a 59 year old male who presents to 2A today after biopsy for routine heart transplant follow-up. 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 He also had hyperkalemia, which improved following kayexalate and stopping bactrim.  He has had normal graft function by echo and right heart catheterizations.  No history of rejection he has no DSA's.     Since last transplant clinic visit he reports feeling well.  His creatinine was elevated a few weeks ago felt to be secondary to volume depletion so he was encouraged to drink more fluids.  It has since improved.  White blood cell count also has trended down.  Though improved on last check to 2.3.  All other counts are stable.  He recently started Dexcom to help monitor blood sugars and has had less episodes of hypoglycemia.  He is followed closely by Dr. Schafer.  Blood pressures at home 110s to 140s over 80s to 90s. Red does note HRs consistently 110-120, no palpitations or  He continues to work with cardiac rehab and denies any exertional chest pain or shortness of breath.  Denies orthopnea, PND, exertional shortness of breath, or extremity edema.  His appetite is good.  He has no new concerns today.  He has not checking daily temperatures but denies any fevers, chills, no nausea, vomiting, diarrhea, oral lesions, rashes, mouth sores.    PAST  MEDICAL HISTORY:  Past Medical History:   Diagnosis Date     Acute kidney injury (H)      CKD (chronic kidney disease)      Coronary artery disease      Diabetes mellitus (H)      HTN (hypertension)      Hyperlipidemia      Ischemic cardiomyopathy      LV (left ventricular) mural thrombus      Paroxysmal atrial flutter (H)      RVF (right ventricular failure) (H)      Systolic heart failure (H)      Ventricular tachycardia (H)        FAMILY HISTORY:  History reviewed. No pertinent family history.    SOCIAL HISTORY:  Social History     Socioeconomic History     Marital status: Single     Spouse name: None     Number of children: None     Years of education: None     Highest education level: None   Occupational History     None   Social Needs     Financial resource strain: None     Food insecurity     Worry: None     Inability: None     Transportation needs     Medical: None     Non-medical: None   Tobacco Use     Smoking status: Never Smoker     Smokeless tobacco: Never Used   Substance and Sexual Activity     Alcohol use: No     Frequency: Never     Drug use: No     Sexual activity: None   Lifestyle     Physical activity     Days per week: None     Minutes per session: None     Stress: None   Relationships     Social connections     Talks on phone: None     Gets together: None     Attends [employer] service: None     Active member of club or organization: None     Attends meetings of clubs or organizations: None     Relationship status: None     Intimate partner violence     Fear of current or ex partner: None     Emotionally abused: None     Physically abused: None     Forced sexual activity: None   Other Topics Concern     Parent/sibling w/ CABG, MI or angioplasty before 65F 55M? No   Social History Narrative     None       CURRENT MEDICATIONS:  No current facility-administered medications on file prior to encounter.   aspirin (ASA) 81 MG chewable tablet, Take 1 tablet (81 mg) by mouth daily  calcium carbonate 600  mg-vitamin D 400 units (CALTRATE) 600-400 MG-UNIT per tablet, Take 1 tablet by mouth 2 times daily (with meals)  insulin aspart (NOVOLOG PEN) 100 UNIT/ML pen, Correction Scale - HIGH INSULIN RESISTANCE DOSING     Do Not give Correction Insulin if Pre-Meal BG less than 140.   For Pre-Meal  - 164 give 1 unit.   For Pre-Meal  - 189 give 2 units.   For Pre-Meal  - 214 give 3 units.   For Pre-Meal  - 239 give 4 units.   For Pre-Meal  - 264 give 5 units.   For Pre-Meal  - 289 give 6 units.   For Pre-Meal  - 314 give 7 units.   For Pre-Meal  - 339 give 8 units.   For Pre-Meal  - 364 give 9 units.   For Pre-Meal BG greater than or equal to 365 give 10 units  To be given with prandial insulin, and based on pre-meal blood glucose.   Notify provider if glucose greater than or equal to 350 mg/dL after administration of correction dose. If given at mealtime, administer within 30 minutes of start of meal  insulin aspart (NOVOLOG PEN) 100 UNIT/ML pen, Inject 1-7 Units Subcutaneous At Bedtime HIGH INSULIN RESISTANCE DOSING    Do Not give Bedtime Correction Insulin if BG less than 200.   For  - 224 give 1 units.   For  - 249 give 2 units.   For  - 274 give 3 units.   For  - 299 give 4 units.   For  - 324 give 5 units.   For  - 349 give 6 units.   For BG greater than or equal to 350 give 7 units.   Notify provider if glucose greater than or equal to 350 mg/dL after administration of correction dose. If given at mealtime, administer within 30 minutes of start of meal  acetaminophen (TYLENOL) 325 MG tablet, Take 2 tablets (650 mg) by mouth every 4 hours as needed for other (multimodal surgical pain management along with NSAIDS and opioid medication as indicated based on pain control and physical function.)        ROS:  See HPI    EXAM:  /86 (BP Location: Right arm, Cuff Size: Adult Regular)   Temp 97.9  F (36.6  C) (Oral)   Resp 20   SpO2  97%     GENERAL: Appears comfortable, in no acute distress.   HEENT: Eye symmetrical, no discharge or icterus bilaterally. Mucous membranes moist and without lesions. No thrush.   CV: Tachycardic and regular, +S1S2, no murmur, rub, or gallop. JVP not visible at 75 degrees.   RESPIRATORY: Respirations regular, even, and unlabored. Lungs CTA throughout.   GI: Soft, obese and non distended with normoactive bowel sounds present in all quadrants. No tenderness, rebound, guarding. No hepatomegaly.   EXTREMITIES: No peripheral edema. 2+ bilateral pedal pulses.   NEUROLOGIC: Alert and oriented x 3. No focal deficits.   MUSCULOSKELETAL: No joint swelling or tenderness.   SKIN: No jaundice. No rashes or lesions.     Labs - reviewed with patient in clinic today:  CBC RESULTS:  Lab Results   Component Value Date    WBC 2.4 (L) 08/13/2020    RBC 3.21 (L) 08/13/2020    HGB 8.9 (L) 08/13/2020    HCT 29.6 (L) 08/13/2020    MCV 92 08/13/2020    MCH 27.7 08/13/2020    MCHC 30.1 (L) 08/13/2020    RDW 18.3 (H) 08/13/2020     08/13/2020       CMP RESULTS:  Lab Results   Component Value Date     08/13/2020    POTASSIUM 4.2 08/13/2020    CHLORIDE 111 (H) 08/13/2020    CO2 25 08/13/2020    ANIONGAP 5 08/13/2020     (H) 08/13/2020    BUN 42 (H) 08/13/2020    CR 1.76 (H) 08/13/2020    GFRESTIMATED 42 (L) 08/13/2020    GFRESTBLACK 48 (L) 08/13/2020    ARLENE 8.7 08/13/2020    BILITOTAL 0.4 08/13/2020    ALBUMIN 3.1 (L) 08/13/2020    ALKPHOS 55 08/13/2020    ALT 20 08/13/2020    AST 18 08/13/2020        INR RESULTS:  Lab Results   Component Value Date    INR 1.16 (H) 06/04/2020       LIPID RESULTS:  Lab Results   Component Value Date    CHOL 183 06/15/2020    HDL 86 06/15/2020    LDL 82 06/15/2020    TRIG 73 06/15/2020       IMMUNOSUPPRESSANT LEVELS:  Lab Results   Component Value Date    TACROL 9.9 08/05/2020    DOSTAC 365162 [id number] 08/05/2020       No components found for: CK  Lab Results   Component Value Date    MAG 1.6  08/13/2020     Lab Results   Component Value Date    A1C 7.2 (H) 08/13/2020     Lab Results   Component Value Date    PHOS 3.4 08/13/2020     Lab Results   Component Value Date    NTBNP 404 (H) 06/26/2019     Lab Results   Component Value Date    SAITESTMET [facility] 06/22/2020    SAICELL Class I 06/22/2020    [id number] Cw:15 06/22/2020    [id number] Cw:18 06/22/2020    SAIREPCOM  06/22/2020      Test performed by modified procedure. Serum heat inactivated and tested   by a modified ([facility]) protocol including fetal calf serum addition.   High-risk, mfi >3,000. Mod-risk, mfi 500-3,000.       Lab Results   Component Value Date    SAIITESTME [facility] 06/22/2020    SAIICELL Class II 06/22/2020    [id number] None 06/22/2020    [id number] None 06/22/2020    SAIIREPCOM  06/22/2020      Test performed by modified procedure. Serum heat inactivated and tested   by a modified ([facility]) protocol including fetal calf serum addition.   High-risk, mfi >3,000. Mod-risk, mfi 500-3,000.       Lab Results   Component Value Date    CSPEC EDTA PLASMA 06/15/2020       Diagnostic Studies:  TTE 6/15/20  Global and regional left ventricular function is normal with an EF of 60-65%.  Right ventricular function, chamber size, wall motion, and thickness are normal.  The inferior vena cava is normal.  No pericardial effusion is present.     RHC 6/30/20  RA 10/12/9  RV 35/9  PA 35/20/27  PCWP 26/26/20  AMRIT 6.3/3.3  TD 7.2/3.8  PVR 0.97 (TD)  SVR 1333    RHC 7/29/20        Assessment/Plan:  Mr. Sharp is a 59 year old male who is s/p orthotopic heart transplant on 5/18/20 who presents to for routine follow-up on 2A. 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  Postop course complicated by NSVT, leukocytosis, and C acnes growing from former LVAD site treated with 14 days of antibiotics. Donor  blood growing S anginosus and Veillonella (also thought to be a contaminant, but treated with Vanco/zosyn --> Vanco/Flagyl for total 7 days). His graft function remains normal by echocardiogram and last RHC showed normal filling pressures and output. He has no history of rejection or DSAs. Will increase hydralazine today given elevated dBP. HR has been 110s-120, no symptoms, ECG today confirms sinus tach.     # Status post OHT on 5/18/20, history of ICM  # Chronic immunosuppression  * Rejection history: none.   * Recent immunosuppression changes: none  * Last biopsy: 7/29 NER  * Intolerance to medications: none     Graft function:  - Fluid status: euvolemic not on diuretic  - BP: uncontrolled, increase hydral to 50 mg tid, continue amlodipine 10 mg daily  - HRs: 110s-120s, ECG today confirms sinus     Immunosuppression:  - Received Simulect 5/18 and 5/22  - MMF 1500mg bid  - tacrolimus goal level 10-12, pending today  - prednisone per taper, currently 10 mg bid     PPx:  - CAV: aspirin 81mg daily, crestor 10mg daily  - PCP: pentamidine given 7/29, may not need next dose as will be almost off prednisone  - CMV: Valcyte 450mg daily (renally dosed), x6 months (through 11/2020)  - Thrush: Nystatin QID  - Osteoporosis: calcium/vitamin D   - GI: pantoprazole 40mg daily     Serostatus: CMV D+/R-  EBV D-/R+ Toxo D-/R-     #Uncontrolled hypertension  uncontrolled, increase hydral to 50 mg tid, continue amlodipine 10 mg daily     #Leukopenia  WBC larry 1.7 recently, now 2.4. Added on diff, ANC 1.7, ALC 0.5. No symptoms of infection or CMV, EBV. CMV and EBV pending today. Likely drug induced. High risk CMV, continue valcyte. Not on bactrim. May need to decrease MMF. Awaiting Dr Ortiz's recs per transplant RN.     #Hypomagnesium  Mg 1.6 today, continue 400 mg bid supplement.      #DM Type II   #Symptomatic hypoglycemia  Using DexCom to help manage. Less episodes of hypoglycemia. Follow with Dr Schafer. a1c 7.2.       #Leukocytosis, resolved  #C. Acnes growing from his former LVAD site  #Donor positive S Anginosus and Veillonella  Thought to be a contaminant, but treated with IV vanco --> po doxy through 6/1/20 (14d course total). Donor blood grew S anginosus and Veillonella (also thought to be a contaminant), but treated with Vanco/zosyn --> Vanco/Flagyl for a total of 7 days.      #Elevated PSA  Prostate MRI showed signs of BPH. Seen by urology 6/5, no note written so unclear plan? Will clarify w coordinator.                 25 minutes spent face-to-face with patient, >50% in counseling and/or coordination of care as described above      Dinora Harper DNP, NP-C  8/13/2020          ALFREDO RIVAS

## 2020-08-13 NOTE — PROGRESS NOTES
DIABETES EDUCATION NOTE:    Mariusz Sharp presents today for education related to Type 2 diabetes.  Patient is being treated with:  insulin and SMBG  He is accompanied by self    PATIENT CONCERNS RELATED TO DIABETES SELF MANAGEMENT:   A1c improved, his alarms are not loud enough      ASSESSMENT:  Current Diabetes Management per Patient:  Taking diabetes medications?   yes:     Diabetes Medication(s)     Insulin       insulin aspart (NOVOLOG PEN) 100 UNIT/ML pen    1 unit / 8  gms CHO with meals,plus correction insulin. Pt uses approx 40 units in 24 hrs.     insulin aspart (NOVOLOG PEN) 100 UNIT/ML pen    Correction Scale - HIGH INSULIN RESISTANCE DOSING     Do Not give Correction Insulin if Pre-Meal BG less than 140.   For Pre-Meal  - 164 give 1 unit.   For Pre-Meal  - 189 give 2 units.   For Pre-Meal  - 214 give 3 units.   For Pre-Meal  - 239 give 4 units.   For Pre-Meal  - 264 give 5 units.   For Pre-Meal  - 289 give 6 units.   For Pre-Meal  - 314 give 7 units.   For Pre-Meal  - 339 give 8 units.   For Pre-Meal  - 364 give 9 units.   For Pre-Meal BG greater than or equal to 365 give 10 units  To be given with prandial insulin, and based on pre-meal blood glucose.   Notify provider if glucose greater than or equal to 350 mg/dL after administration of correction dose. If given at mealtime, administer within 30 minutes of start of meal     insulin aspart (NOVOLOG PEN) 100 UNIT/ML pen    Inject 1-7 Units Subcutaneous At Bedtime HIGH INSULIN RESISTANCE DOSING    Do Not give Bedtime Correction Insulin if BG less than 200.   For  - 224 give 1 units.   For  - 249 give 2 units.   For  - 274 give 3 units.   For  - 299 give 4 units.   For  - 324 give 5 units.   For  - 349 give 6 units.   For BG greater than or equal to 350 give 7 units.   Notify provider if glucose greater than or equal to 350 mg/dL after administration of correction  dose. If given at mealtime, administer within 30 minutes of start of meal     insulin glargine (LANTUS PEN) 100 UNIT/ML pen    Inject 32 units subcutaneous daily.        Monitoring  Patient glucose self monitoring as follows: rarely.   BG meter: Accu-Chek Denise meter  BG results: see blood glucose log attached to this encounter           BG values are: In goal  Patient's most recent   Lab Results   Component Value Date    A1C 7.2 08/13/2020    is meeting goal of <8.0    Exercise: walks everyday for 30 minutes, goes to PT 3 times per week    Nutrition:   Is now bolusing 10 minutes before he eats    Hypoglycemia:   Patient is at risk of hypoglycemia? yes                    EDUCATION and INSTRUCTION PROVIDED AT THIS VISIT:    We uploaded his DexCom  and his blood sugar has improved.  He is bolusing before he eats now and his blood sugar looks better.  His a1c has come down and it seems likely it will continue to come down.  He has had two lows.  He could not hear the alarms.  We turned the volume up on his  today.    Patient-stated goal written and given to Mariusz Sharp.  Verbalized and demonstrated understanding of instructions.     PLAN:  See patient instructions  AVS printed and given to patient    FOLLOW-UP:    As needed  Time spent with patient at today's visit was 30 minutes.      Any diabetes medication dose changes were made via the CDE Protocol and Collaborative Practice Agreement with [facility] and  Dominguez.  A copy of this encounter was provided to patient's referring provider.

## 2020-08-13 NOTE — DISCHARGE INSTRUCTIONS
[facility]                        Interventional Cardiology  Discharge Instructions   Post Right Heart Cath      AFTER YOU GO HOME:    DO drink plenty of fluids    DO resume your regular diet and medications unless otherwise instructed by your Primary Physician    Do Not scrub the procedure site vigorously    No lotion or powder to the puncture site for 3 days    CALL YOUR PRIMARY PHYSICIAN IF: You may resume all normal activity.  Monitor neck site for bleeding, swelling, or voice changes. If you notice bleeding or swelling immediately apply pressure to the site and call number below to speak with Cardiology Fellow.  If you experience any changes in your breathing you should call your doctor immediately or come to the closest Emergency Department.  Do not drive yourself.    ADDITIONAL INSTRUCTIONS: Medications: You are to resume all home medications including anticoagulation therapy unless otherwise advised by your primary cardiologist or nurse coordinator.    Follow Up: Per your primary cardiology team    If you have any questions or concerns regarding your procedure site please call [phone] at anytime and ask for Cardiology Fellow on call.  They are available 24 hours a day.  You may also contact the Cardiology Clinic after hours number at [phone].                                                       Telephone Numbers [phone] Monday-Friday 8:00 am to 4:30 pm    [phone] After 4:30 pm Monday-Friday, Weekends & Holidays  Ask for Interventional Cardiologist on call. Someone is on call 24 hours/day   [facility] toll free number [phone] Monday-Friday 8:00 am to 4:30 pm   [facility] Emergency Dept [phone]

## 2020-08-13 NOTE — PROGRESS NOTES
Patient returned to 2A post RHC and biopsy.  VSS.  Denies pain.  Right neck site C/D/I, no hematoma.  Declined po food and fluids.  Discharge instructions reviewed, questions answered, copy given to patient.  Provider at bedside, ordered EKG.  Once complete, patient appropriate for discharge.  Will discharge independently from 2A to next appointments.

## 2020-08-13 NOTE — IP AVS SNAPSHOT
MRN:[id number]                      After Visit Summary   8/13/2020    Mariusz Sharp    MRN: [id number]           Visit Information        Department      8/13/2020  8:13 AM [address] [facility] [place]          Review of your medicines      UNREVIEWED medicines. Ask your doctor about these medicines       Dose / Directions   acetaminophen 325 MG tablet  Commonly known as:  TYLENOL  Used for:  Status post heart transplantation (H)      Dose:  650 mg  Take 2 tablets (650 mg) by mouth every 4 hours as needed for other (multimodal surgical pain management along with NSAIDS and opioid medication as indicated based on pain control and physical function.)  Quantity:     Refills:  0     amLODIPine 5 MG tablet  Commonly known as:  NORVASC  Used for:  Other secondary hypertension      Dose:  10 mg  Take 2 tablets (10 mg) by mouth At Bedtime  Quantity:  90 tablet  Refills:  3     aspirin 81 MG chewable tablet  Commonly known as:  ASA  Used for:  LVAD (left ventricular assist device) present (H)      Dose:  81 mg  Take 1 tablet (81 mg) by mouth daily  Quantity:  90 tablet  Refills:  3     calcium carbonate 600 mg-vitamin D 400 units 600-400 MG-UNIT per tablet  Commonly known as:  CALTRATE  Used for:  Status post heart transplantation (H)      Dose:  1 tablet  Take 1 tablet by mouth 2 times daily (with meals)  Quantity:     Refills:  0     clotrimazole 10 MG lozenge  Commonly known as:  MYCELEX  Used for:  Status post heart transplantation (H)      Dose:  10 mg  Take 1 Allison (10 mg) by mouth 4 times daily  Quantity:  360 Allison  Refills:  1     hydrALAZINE 25 MG tablet  Commonly known as:  APRESOLINE  Used for:  Heart replaced by transplant (H)      Dose:  25 mg  Take 1 tablet (25 mg) by mouth 3 times daily  Quantity:  270 tablet  Refills:  3     * insulin aspart 100 UNIT/ML pen  Commonly known as:  NovoLOG PEN  Used for:  Status post heart transplantation (H)      Correction Scale - HIGH INSULIN RESISTANCE DOSING      Do Not give Correction Insulin if Pre-Meal BG less than 140.   For Pre-Meal  - 164 give 1 unit.   For Pre-Meal  - 189 give 2 units.   For Pre-Meal  - 214 give 3 units.   For Pre-Meal  - 239 give 4 units.   For Pre-Meal  - 264 give 5 units.   For Pre-Meal  - 289 give 6 units.   For Pre-Meal  - 314 give 7 units.   For Pre-Meal  - 339 give 8 units.   For Pre-Meal  - 364 give 9 units.   For Pre-Meal BG greater than or equal to 365 give 10 units  To be given with prandial insulin, and based on pre-meal blood glucose.   Notify provider if glucose greater than or equal to 350 mg/dL after administration of correction dose. If given at mealtime, administer within 30 minutes of start of meal  Quantity:  3 mL  Refills:  0     * insulin aspart 100 UNIT/ML pen  Commonly known as:  NovoLOG PEN  Used for:  Status post heart transplantation (H)      Dose:  1-7 Units  Inject 1-7 Units Subcutaneous At Bedtime HIGH INSULIN RESISTANCE DOSING    Do Not give Bedtime Correction Insulin if BG less than 200.   For  - 224 give 1 units.   For  - 249 give 2 units.   For  - 274 give 3 units.   For  - 299 give 4 units.   For  - 324 give 5 units.   For  - 349 give 6 units.   For BG greater than or equal to 350 give 7 units.   Notify provider if glucose greater than or equal to 350 mg/dL after administration of correction dose. If given at mealtime, administer within 30 minutes of start of meal  Quantity:  3 mL  Refills:  0     * insulin aspart 100 UNIT/ML pen  Commonly known as:  NovoLOG PEN  Used for:  Status post heart transplantation (H)      1 unit / 8  gms CHO with meals,plus correction insulin. Pt uses approx 40 units in 24 hrs.  Quantity:  3 mL  Refills:  3     insulin glargine 100 UNIT/ML pen  Commonly known as:  LANTUS PEN  Used for:  Status post heart transplantation (H)      Inject 32 units subcutaneous daily.  Quantity:  15 mL  Refills:  3      magnesium oxide 400 (241.3 Mg) MG tablet  Commonly known as:  MAG-OX  Used for:  Hypomagnesemia      Dose:  400 mg  Take 1 tablet (400 mg) by mouth 2 times daily  Quantity:  90 tablet  Refills:  3     mycophenolate 500 MG tablet  Commonly known as:  GENERIC EQUIVALENT  Used for:  Status post heart transplantation (H)      TAKE THREE TABLETS BY MOUTH TWICE A DAY  Quantity:  180 tablet  Refills:  11     pantoprazole 40 MG EC tablet  Commonly known as:  PROTONIX  Used for:  Status post heart transplantation (H)      TAKE ONE TABLET BY MOUTH EVERY MORNING BEFORE BREAKFAST  Quantity:  90 tablet  Refills:  3     predniSONE 10 MG tablet  Commonly known as:  DELTASONE  Used for:  Status post heart transplantation (H)      Take 0.5 tablets (5 mg) by mouth every morning AND 0.5 tablets (5 mg) every evening.  Quantity:  120 tablet  Refills:  3     rosuvastatin 10 MG tablet  Commonly known as:  CRESTOR  Used for:  Status post heart transplantation (H)      TAKE ONE TABLET BY MOUTH ONCE DAILY  Quantity:  90 tablet  Refills:  3     * tacrolimus 0.5 MG capsule  Commonly known as:  GENERIC EQUIVALENT  Used for:  Status post heart transplantation (H)      Take one 0.5mg capsule WITH three 1mg capsules to equal 3.5mg every morning.  Quantity:  120 capsule  Refills:  0     * tacrolimus 1 MG capsule  Commonly known as:  GENERIC EQUIVALENT  Used for:  Status post heart transplantation (H)      Take 3 capsules with one 0.5mg capule (3.5 mg) by mouth every morning AND 3 capsules  (3mg) every evening.  Quantity:  180 capsule  Refills:  11     valGANciclovir 450 MG tablet  Commonly known as:  VALCYTE  Used for:  Status post heart transplantation (H)      TAKE ONE TABLET (450MG) BY MOUTH DAILY  Quantity:  30 tablet  Refills:  3         * This list has 5 medication(s) that are the same as other medications prescribed for you. Read the directions carefully, and ask your doctor or other care provider to review them with you.             CONTINUE these medicines which have NOT CHANGED       Dose / Directions   Accu-Chek Denise Plus test strip  Used for:  Status post heart transplantation (H)  Generic drug:  blood glucose      Check BG 3 times daily at meals and at bedtime.  Quantity:  300 strip  Refills:  0     BD SILVANA U/F 32G X 4 MM miscellaneous  Used for:  Type 2 diabetes mellitus with hyperglycemia, with long-term current use of insulin (H)  Generic drug:  insulin pen needle      Use 3-4 daily.  Quantity:  250 each  Refills:  4     Dexcom G6  Monie  Used for:  Type 2 diabetes mellitus with hyperglycemia, with long-term current use of insulin (H)      Dose:  1 each  1 each daily  Quantity:  1 Device  Refills:  1     Dexcom G6 Sensor Misc  Used for:  Type 2 diabetes mellitus with hyperglycemia, with long-term current use of insulin (H)      Dose:  1 each  1 each every 10 days  Quantity:  9 each  Refills:  3     Dexcom G6 Transmitter Misc  Used for:  Type 2 diabetes mellitus with hyperglycemia, with long-term current use of insulin (H)      Dose:  1 each  1 each every 3 months  Quantity:  1 each  Refills:  3              Protect others around you: Learn how to safely use, store and throw away your medicines at www.disposemymeds.org.       Follow-ups after your visit       Your next 10 appointments already scheduled    Aug 13, 2020  Procedure with Khris Mcclelland MD  [facility] ([facility]) [address] [place] [id number]  [phone]   The [facility] is located on the corner of [address] [facility] and [facility] on the [facility]. It is easily accessible from virtually any point in the [facility] area, via [address] and [address].   Aug 13, 2020 11:30 AM CDT  Echo Complete with UUECHR2  [facility] ([facility]) [address] [place] [address]  [phone]   1. Please bring or wear a comfortable two-piece outfit.  2. You may eat, drink and take your normal medicines.  3. Please do not apply perfumes or lotions on the day of your exam.   4. For any questions that cannot be answered, please contact the ordering physician     Aug 13, 2020  2:30 PM CDT  (Arrive by 2:15 PM)  Diabetes Education with Gabby Cortez RN  [facility] Diabetes ([facility]) [address] [facility] [id number] [phone]      Aug 26, 2020  9:30 AM CDT  LAB with URONI LAB GOLD WAITING  [facility] Lab ([facility]) [address] [address]   Please do not eat 10-12 hours before your appointment if you are coming in fasting for labs on lipids, cholesterol, or glucose (sugar). Does not apply to pregnant women. Water, tea and black coffee (with nothing added) is okay. Do not drink other fluids, diet soda or gum. If you have concerns about taking your medications, please send a message by clicking on Secure Messaging, Message Your Care Team.     Aug 26, 2020 10:00 AM CDT  Procedure - 2.5 hour with U2A ROOM 17  [address]A [facility] ([facility]) [address] [place] [phone]      Aug 26, 2020  Procedure with Cardiologist MD Maddie  [facility]  [facility] Lab ([facility]) [address] [place] [id number] [phone]   The [facility] is located on the corner of [facility] [facility] and [facility] on the [facility]. It is easily accessible from virtually any point in the metro area, via [address] and [address].   Aug 26, 2020  1:00 PM CDT  (Arrive by 12:45 PM)  RETURN HEART TRANSPLANT with Jenni Ortiz MD  [facility] Care ([facility]) [address] [place] [address]  [facility] [address]  [phone]      Aug 28, 2020 12:00 PM CDT  Telephone Visit with Kenton Carlos MD  [facility] ([facility]) [address]  4th [address]  [facility] [id number] [phone]   [facility]  Note: this is not an onsite visit; there is no need to come to the facility.  Please have a list of all current medications available for appointment.      Sep 30, 2020  8:30 AM CDT  LAB with UU LAB GOLD WAITING  [facility], Lab ([facility]) [address] [facility] [address]   Please do not eat 10-12 hours before your appointment if you are coming in fasting for labs on lipids, cholesterol, or glucose (sugar). Does not apply to pregnant women. Water, tea and black coffee (with nothing added) is okay. Do not drink other fluids, diet soda or gum. If you have concerns about taking your medications, please send a message by clicking on Secure Messaging, Message Your Care Team.     Sep 30, 2020  9:00 AM CDT  Procedure - 2.5 hour with U2A ROOM 9  Unit 2A [facility] ([facility]) [address] [facility] [address] [facility] [address]         Care Instructions       Further instructions from your care team       [facility]                        Interventional Cardiology  Discharge Instructions   Post Right Heart Cath      AFTER YOU GO HOME:    DO drink plenty of fluids    DO resume your regular diet and medications unless otherwise instructed by your Primary Physician    Do Not scrub the procedure site vigorously    No lotion or powder to the puncture site for 3 days    CALL YOUR PRIMARY PHYSICIAN IF: You may resume all normal activity.  Monitor neck site for bleeding, swelling, or voice changes. If you notice bleeding or  swelling immediately apply pressure to the site and call number below to speak with Cardiology Fellow.  If you experience any changes in your breathing you should call your doctor immediately or come to the closest Emergency Department.  Do not drive yourself.    ADDITIONAL INSTRUCTIONS: Medications: You are to resume all home medications including anticoagulation therapy unless otherwise advised by your primary cardiologist or nurse coordinator.    Follow Up: Per your primary cardiology team    If you have any questions or concerns regarding your procedure site please call [phone] at anytime and ask for Cardiology Fellow on call.  They are available 24 hours a day.  You may also contact the Cardiology Clinic after hours number at [phone].                                                       Telephone Numbers [phone] Monday-Friday 8:00 am to 4:30 pm    [phone] After 4:30 pm Monday-Friday, Weekends & Holidays  Ask for Interventional Cardiologist on call. Someone is on call 24 hours/day   [facility] toll free number [phone] Monday-Friday 8:00 am to 4:30 pm   [facility] Emergency Dept [phone]                   Additional Information About Your Visit       [employer] Information    [employer] gives you secure access to your electronic health record. If you see a primary care provider, you can also send messages to your care team and make appointments. If you have questions, please call your primary care clinic.  If you do not have a primary care provider, please call [phone] and they will assist you.       Care EveryWhere ID    This is your Care EveryWhere ID. This could be used by other organizations to access your [place] medical records  [id number]       Your Vitals Were  Most recent update: 8/13/2020  8:58 AM    Blood Pressure   114/86 (BP Location: Right arm, Cuff Size: Adult Regular)    Temperature   97.9  F (36.6  C) (Oral)    Respirations   20    Pulse Oximetry   97%            Primary Care Provider Office Phone # Fax #    Milad Fry [phone]      Equal Access to Services    GILLIAN COX : Olena aad ku hadromaineviktor French, amaurymelida santoyojulietteha, yady doraadalbertomelida thomasaraceli, mynor lynnettein hayaan marthagalileo gibbons. So [facility] [phone].    ATENCIÓN: Si habla español, tiene a waddell disposición servicios gratuitos de asistencia lingüística. Llame al [phone].    We comply with applicable federal and state civil rights laws, including the [place] Human Rights Act. We do not discriminate on the basis of race, color, creed, Bahai, national origin, marital status, age, disability, sex, sexual orientation, or gender identity.       Thank you!    Thank you for choosing [facility] for your care. Our goal is always to provide you with excellent care. Hearing back from our patients is one way we can continue to improve our services. Please take a few minutes to complete the written survey that you may receive in the mail after you visit with us. Thank you!            Medication List      Medications          Morning Afternoon Evening Bedtime As Needed    Accu-Chek Denise Plus test strip  INSTRUCTIONS:  Check BG 3 times daily at meals and at bedtime.  Generic drug:  blood glucose                     BD SILVANA U/F 32G X 4 MM miscellaneous  INSTRUCTIONS:  Use 3-4 daily.  Generic drug:  insulin pen needle                     Dexcom G6  Monie  INSTRUCTIONS:  1 each daily                     Dexcom G6 Sensor Misc  INSTRUCTIONS:  1 each every 10 days                     Dexcom G6 Transmitter Misc  INSTRUCTIONS:  1 each every 3 months                       ASK your doctor about these medications          Morning Afternoon Evening Bedtime As Needed    acetaminophen 325 MG tablet  Also known as:  TYLENOL  INSTRUCTIONS:  Take 2 tablets (650 mg) by mouth every 4 hours as needed for other (multimodal surgical pain management along with NSAIDS and opioid medication as indicated based on  pain control and physical function.)                     amLODIPine 5 MG tablet  Also known as:  NORVASC  INSTRUCTIONS:  Take 2 tablets (10 mg) by mouth At Bedtime                     aspirin 81 MG chewable tablet  Also known as:  ASA  INSTRUCTIONS:  Take 1 tablet (81 mg) by mouth daily                     calcium carbonate 600 mg-vitamin D 400 units 600-400 MG-UNIT per tablet  Also known as:  CALTRATE  INSTRUCTIONS:  Take 1 tablet by mouth 2 times daily (with meals)                     clotrimazole 10 MG lozenge  Also known as:  MYCELEX  INSTRUCTIONS:  Take 1 Allison (10 mg) by mouth 4 times daily                     hydrALAZINE 25 MG tablet  Also known as:  APRESOLINE  INSTRUCTIONS:  Take 1 tablet (25 mg) by mouth 3 times daily                     * insulin aspart 100 UNIT/ML pen  Also known as:  NovoLOG PEN  INSTRUCTIONS:  Correction Scale - HIGH INSULIN RESISTANCE DOSING     Do Not give Correction Insulin if Pre-Meal BG less than 140.   For Pre-Meal  - 164 give 1 unit.   For Pre-Meal  - 189 give 2 units.   For Pre-Meal  - 214 give 3 units.   For Pre-Meal  - 239 give 4 units.   For Pre-Meal  - 264 give 5 units.   For Pre-Meal  - 289 give 6 units.   For Pre-Meal  - 314 give 7 units.   For Pre-Meal  - 339 give 8 units.   For Pre-Meal  - 364 give 9 units.   For Pre-Meal BG greater than or equal to 365 give 10 units  To be given with prandial insulin, and based on pre-meal blood glucose.   Notify provider if glucose greater than or equal to 350 mg/dL after administration of correction dose. If given at mealtime, administer within 30 minutes of start of meal                     * insulin aspart 100 UNIT/ML pen  Also known as:  NovoLOG PEN  INSTRUCTIONS:  Inject 1-7 Units Subcutaneous At Bedtime HIGH INSULIN RESISTANCE DOSING    Do Not give Bedtime Correction Insulin if BG less than 200.   For  - 224 give 1 units.   For  - 249 give 2 units.   For  -  274 give 3 units.   For  - 299 give 4 units.   For  - 324 give 5 units.   For  - 349 give 6 units.   For BG greater than or equal to 350 give 7 units.   Notify provider if glucose greater than or equal to 350 mg/dL after administration of correction dose. If given at mealtime, administer within 30 minutes of start of meal                     * insulin aspart 100 UNIT/ML pen  Also known as:  NovoLOG PEN  INSTRUCTIONS:  1 unit / 8  gms CHO with meals,plus correction insulin. Pt uses approx 40 units in 24 hrs.                     insulin glargine 100 UNIT/ML pen  Also known as:  LANTUS PEN  INSTRUCTIONS:  Inject 32 units subcutaneous daily.  Doctor's comments:  If Lantus is not covered by insurance, may substitute Basaglar at same dose and frequency.                       magnesium oxide 400 (241.3 Mg) MG tablet  Also known as:  MAG-OX  INSTRUCTIONS:  Take 1 tablet (400 mg) by mouth 2 times daily                     mycophenolate 500 MG tablet  Also known as:  GENERIC EQUIVALENT  INSTRUCTIONS:  TAKE THREE TABLETS BY MOUTH TWICE A DAY                     pantoprazole 40 MG EC tablet  Also known as:  PROTONIX  INSTRUCTIONS:  TAKE ONE TABLET BY MOUTH EVERY MORNING BEFORE BREAKFAST                     predniSONE 10 MG tablet  Also known as:  DELTASONE  INSTRUCTIONS:  Take 0.5 tablets (5 mg) by mouth every morning AND 0.5 tablets (5 mg) every evening.  Doctor's comments:  TXP DT 5/18/2020 (Heart) TXP Dischg DT 5/29/2020 DX Heart transplant Z94.1 TX Center [facility], ([place], [place])                     rosuvastatin 10 MG tablet  Also known as:  CRESTOR  INSTRUCTIONS:  TAKE ONE TABLET BY MOUTH ONCE DAILY                     * tacrolimus 0.5 MG capsule  Also known as:  GENERIC EQUIVALENT  INSTRUCTIONS:  Take one 0.5mg capsule WITH three 1mg capsules to equal 3.5mg every morning.  Doctor's comments:  TXP DT 5/18/2020 (Heart) TXP Dischg DT 5/29/2020 DX Heart replaced by transplant Z94.1 TX Center [facility] ([place], [place])                     * tacrolimus 1 MG capsule  Also known as:  GENERIC EQUIVALENT  INSTRUCTIONS:  Take 3 capsules with one 0.5mg capule (3.5 mg) by mouth every morning AND 3 capsules  (3mg) every evening.                     valGANciclovir 450 MG tablet  Also known as:  VALCYTE  INSTRUCTIONS:  TAKE ONE TABLET (450MG) BY MOUTH DAILY                        * This list has 5 medication(s) that are the same as other medications prescribed for you. Read the directions carefully, and ask your doctor or other care provider to review them with you.

## 2020-08-14 ENCOUNTER — TELEPHONE (OUTPATIENT)
Dept: TRANSPLANT | Facility: CLINIC | Age: 60
End: 2020-08-14

## 2020-08-14 DIAGNOSIS — Z94.1 STATUS POST HEART TRANSPLANTATION (H): ICD-10-CM

## 2020-08-14 LAB
CMV DNA SPEC NAA+PROBE-ACNC: NORMAL [IU]/ML
CMV DNA SPEC NAA+PROBE-LOG#: NORMAL {LOG_IU}/ML
COPATH REPORT: NORMAL
DONOR IDENTIFICATION: NORMAL
DSA COMMENTS: NORMAL
DSA PRESENT: NO
DSA TEST METHOD: NORMAL
EBV DNA # SPEC NAA+PROBE: NORMAL {COPIES}/ML
EBV DNA SPEC NAA+PROBE-LOG#: NORMAL {LOG_COPIES}/ML
INTERPRETATION ECG - MUSE: NORMAL
ORGAN: NORMAL
SA1 CELL: NORMAL
SA1 COMMENTS: NORMAL
SA1 HI RISK ABY: NORMAL
SA1 MOD RISK ABY: NORMAL
SA1 TEST METHOD: NORMAL
SA2 CELL: NORMAL
SA2 COMMENTS: NORMAL
SA2 HI RISK ABY UA: NORMAL
SA2 MOD RISK ABY: NORMAL
SA2 TEST METHOD: NORMAL
SPECIMEN SOURCE: NORMAL
UNACCEPTABLE ANTIGEN: NORMAL
UNOS CPRA: 0

## 2020-08-14 RX ORDER — TACROLIMUS 1 MG/1
CAPSULE ORAL
Qty: 180 CAPSULE | Refills: 11 | Status: ON HOLD | OUTPATIENT
Start: 2020-08-14 | End: 2020-08-21

## 2020-08-14 RX ORDER — TACROLIMUS 0.5 MG/1
CAPSULE ORAL
Qty: 120 CAPSULE | Refills: 0 | Status: ON HOLD | OUTPATIENT
Start: 2020-08-14 | End: 2020-08-21

## 2020-08-14 NOTE — TELEPHONE ENCOUNTER
Tacrolimus level 6.4.  Goal 8-10.  Pt states 11 hour trough.  Pt to increase Tacrolimus dose to 3.5mg BID and recheck at appointment in two weeks.  Pt states understanding instructions and plan of care.

## 2020-08-17 ENCOUNTER — TELEPHONE (OUTPATIENT)
Dept: TRANSPLANT | Facility: CLINIC | Age: 60
End: 2020-08-17

## 2020-08-17 DIAGNOSIS — Z94.1 STATUS POST HEART TRANSPLANTATION (H): ICD-10-CM

## 2020-08-17 DIAGNOSIS — Z94.1 HEART REPLACED BY TRANSPLANT (H): ICD-10-CM

## 2020-08-17 LAB — IMMUKNOW IMMUNE CELL FUNCTION: 75 NG/ML

## 2020-08-17 RX ORDER — PREDNISONE 10 MG/1
5 TABLET ORAL DAILY
Qty: 120 TABLET | Refills: 0 | Status: SHIPPED | OUTPATIENT
Start: 2020-08-17 | End: 2020-08-27

## 2020-08-17 NOTE — TELEPHONE ENCOUNTER
Patient Call: Voicemail  Date/Time: 8/17/20 1:56PM  Reason for call: Pt calling with concerns about their blood pressure. Their BP this AM was 121/78, when pt arrived at rehab today the BP was 101/72. At completion of rehab today, BP was at 80/60.     Rehab is concerned pt is taking too much hydralazine. Pt has not taken their 2pm dose yet today.    Please call ASAP.

## 2020-08-17 NOTE — TELEPHONE ENCOUNTER
Pt's heart biopsy negative.  Pt to decrease Prednisone dose to 5mg daily.  Pt states BP has been improved on 50mg of Hydralazine TID.  BP's have been less than 140/90.  Negative DSAs.  Pt states understanding instructions and plan of care.

## 2020-08-18 ENCOUNTER — TELEPHONE (OUTPATIENT)
Dept: TRANSPLANT | Facility: CLINIC | Age: 60
End: 2020-08-18

## 2020-08-18 ENCOUNTER — HOSPITAL ENCOUNTER (INPATIENT)
Facility: CLINIC | Age: 60
LOS: 3 days | Discharge: HOME | End: 2020-08-21
Attending: EMERGENCY MEDICINE | Admitting: INTERNAL MEDICINE
Payer: COMMERCIAL

## 2020-08-18 ENCOUNTER — APPOINTMENT (OUTPATIENT)
Dept: GENERAL RADIOLOGY | Facility: CLINIC | Age: 60
End: 2020-08-18
Attending: EMERGENCY MEDICINE
Payer: COMMERCIAL

## 2020-08-18 DIAGNOSIS — N41.1 PROSTATITIS, CHRONIC: Primary | ICD-10-CM

## 2020-08-18 DIAGNOSIS — R97.20 ELEVATED PROSTATE SPECIFIC ANTIGEN (PSA): ICD-10-CM

## 2020-08-18 DIAGNOSIS — J18.9 COMMUNITY ACQUIRED PNEUMONIA OF LEFT LOWER LOBE OF LUNG: ICD-10-CM

## 2020-08-18 DIAGNOSIS — Z94.1 HEART REPLACED BY TRANSPLANT (H): ICD-10-CM

## 2020-08-18 DIAGNOSIS — Z94.1 STATUS POST HEART TRANSPLANTATION (H): ICD-10-CM

## 2020-08-18 DIAGNOSIS — Z20.828 EXPOSURE TO SARS-ASSOCIATED CORONAVIRUS: ICD-10-CM

## 2020-08-18 LAB
ALBUMIN SERPL-MCNC: 2.9 G/DL (ref 3.4–5)
ALBUMIN UR-MCNC: 30 MG/DL
ALP SERPL-CCNC: 57 U/L (ref 40–150)
ALT SERPL W P-5'-P-CCNC: 16 U/L (ref 0–70)
ANION GAP SERPL CALCULATED.3IONS-SCNC: 7 MMOL/L (ref 3–14)
ANISOCYTOSIS BLD QL SMEAR: SLIGHT
APPEARANCE UR: ABNORMAL
AST SERPL W P-5'-P-CCNC: 16 U/L (ref 0–45)
BASOPHILS # BLD AUTO: 0.1 10E9/L (ref 0–0.2)
BASOPHILS NFR BLD AUTO: 1.7 %
BILIRUB SERPL-MCNC: 0.7 MG/DL (ref 0.2–1.3)
BILIRUB UR QL STRIP: NEGATIVE
BUN SERPL-MCNC: 34 MG/DL (ref 7–30)
CALCIUM SERPL-MCNC: 8.6 MG/DL (ref 8.5–10.1)
CHLORIDE SERPL-SCNC: 104 MMOL/L (ref 94–109)
CO2 SERPL-SCNC: 24 MMOL/L (ref 20–32)
COLOR UR AUTO: YELLOW
CREAT SERPL-MCNC: 1.77 MG/DL (ref 0.66–1.25)
DIFFERENTIAL METHOD BLD: ABNORMAL
EOSINOPHIL # BLD AUTO: 0 10E9/L (ref 0–0.7)
EOSINOPHIL NFR BLD AUTO: 0 %
ERYTHROCYTE [DISTWIDTH] IN BLOOD BY AUTOMATED COUNT: 17.2 % (ref 10–15)
GFR SERPL CREATININE-BSD FRML MDRD: 41 ML/MIN/{1.73_M2}
GLUCOSE BLDC GLUCOMTR-MCNC: 151 MG/DL (ref 70–99)
GLUCOSE SERPL-MCNC: 123 MG/DL (ref 70–99)
GLUCOSE UR STRIP-MCNC: NEGATIVE MG/DL
HCT VFR BLD AUTO: 30.2 % (ref 40–53)
HGB BLD-MCNC: 9.1 G/DL (ref 13.3–17.7)
HGB UR QL STRIP: ABNORMAL
HYALINE CASTS #/AREA URNS LPF: 1 /LPF (ref 0–2)
INTERPRETATION ECG - MUSE: NORMAL
KETONES UR STRIP-MCNC: NEGATIVE MG/DL
LABORATORY COMMENT REPORT: NORMAL
LACTATE BLD-SCNC: 0.9 MMOL/L (ref 0.7–2)
LEUKOCYTE ESTERASE UR QL STRIP: ABNORMAL
LYMPHOCYTES # BLD AUTO: 0.3 10E9/L (ref 0.8–5.3)
LYMPHOCYTES NFR BLD AUTO: 7.8 %
MCH RBC QN AUTO: 27.6 PG (ref 26.5–33)
MCHC RBC AUTO-ENTMCNC: 30.1 G/DL (ref 31.5–36.5)
MCV RBC AUTO: 92 FL (ref 78–100)
MONOCYTES # BLD AUTO: 0.4 10E9/L (ref 0–1.3)
MONOCYTES NFR BLD AUTO: 11.3 %
MUCOUS THREADS #/AREA URNS LPF: PRESENT /LPF
NEUTROPHILS # BLD AUTO: 2.6 10E9/L (ref 1.6–8.3)
NEUTROPHILS NFR BLD AUTO: 79.2 %
NITRATE UR QL: NEGATIVE
NT-PROBNP SERPL-MCNC: 1673 PG/ML (ref 0–900)
PH UR STRIP: 5.5 PH (ref 5–7)
PLATELET # BLD AUTO: 271 10E9/L (ref 150–450)
PLATELET # BLD EST: ABNORMAL 10*3/UL
POIKILOCYTOSIS BLD QL SMEAR: SLIGHT
POTASSIUM SERPL-SCNC: 3.7 MMOL/L (ref 3.4–5.3)
PROCALCITONIN SERPL-MCNC: <0.05 NG/ML
PROT SERPL-MCNC: 6.8 G/DL (ref 6.8–8.8)
RBC # BLD AUTO: 3.3 10E12/L (ref 4.4–5.9)
RBC #/AREA URNS AUTO: >182 /HPF (ref 0–2)
SARS-COV-2 RNA SPEC QL NAA+PROBE: NEGATIVE
SARS-COV-2 RNA SPEC QL NAA+PROBE: NORMAL
SODIUM SERPL-SCNC: 135 MMOL/L (ref 133–144)
SOURCE: ABNORMAL
SP GR UR STRIP: 1.02 (ref 1–1.03)
SPECIMEN SOURCE: NORMAL
SPECIMEN SOURCE: NORMAL
SQUAMOUS #/AREA URNS AUTO: <1 /HPF (ref 0–1)
TROPONIN I SERPL-MCNC: <0.015 UG/L (ref 0–0.04)
UROBILINOGEN UR STRIP-MCNC: NORMAL MG/DL (ref 0–2)
WBC # BLD AUTO: 3.3 10E9/L (ref 4–11)
WBC #/AREA URNS AUTO: 28 /HPF (ref 0–5)

## 2020-08-18 PROCEDURE — 99221 1ST HOSP IP/OBS SF/LOW 40: CPT | Mod: AI | Performed by: INTERNAL MEDICINE

## 2020-08-18 PROCEDURE — 80053 COMPREHEN METABOLIC PANEL: CPT | Performed by: EMERGENCY MEDICINE

## 2020-08-18 PROCEDURE — 87040 BLOOD CULTURE FOR BACTERIA: CPT | Performed by: EMERGENCY MEDICINE

## 2020-08-18 PROCEDURE — 25000132 ZZH RX MED GY IP 250 OP 250 PS 637: Performed by: STUDENT IN AN ORGANIZED HEALTH CARE EDUCATION/TRAINING PROGRAM

## 2020-08-18 PROCEDURE — 85025 COMPLETE CBC W/AUTO DIFF WBC: CPT | Performed by: EMERGENCY MEDICINE

## 2020-08-18 PROCEDURE — U0003 INFECTIOUS AGENT DETECTION BY NUCLEIC ACID (DNA OR RNA); SEVERE ACUTE RESPIRATORY SYNDROME CORONAVIRUS 2 (SARS-COV-2) (CORONAVIRUS DISEASE [COVID-19]), AMPLIFIED PROBE TECHNIQUE, MAKING USE OF HIGH THROUGHPUT TECHNOLOGIES AS DESCRIBED BY CMS-2020-01-R: HCPCS | Performed by: EMERGENCY MEDICINE

## 2020-08-18 PROCEDURE — 96361 HYDRATE IV INFUSION ADD-ON: CPT | Performed by: EMERGENCY MEDICINE

## 2020-08-18 PROCEDURE — 87086 URINE CULTURE/COLONY COUNT: CPT | Performed by: EMERGENCY MEDICINE

## 2020-08-18 PROCEDURE — 25000131 ZZH RX MED GY IP 250 OP 636 PS 637: Performed by: STUDENT IN AN ORGANIZED HEALTH CARE EDUCATION/TRAINING PROGRAM

## 2020-08-18 PROCEDURE — C9803 HOPD COVID-19 SPEC COLLECT: HCPCS | Performed by: EMERGENCY MEDICINE

## 2020-08-18 PROCEDURE — 83880 ASSAY OF NATRIURETIC PEPTIDE: CPT | Performed by: EMERGENCY MEDICINE

## 2020-08-18 PROCEDURE — 96367 TX/PROPH/DG ADDL SEQ IV INF: CPT | Performed by: EMERGENCY MEDICINE

## 2020-08-18 PROCEDURE — 93005 ELECTROCARDIOGRAM TRACING: CPT | Performed by: EMERGENCY MEDICINE

## 2020-08-18 PROCEDURE — 84145 PROCALCITONIN (PCT): CPT | Performed by: EMERGENCY MEDICINE

## 2020-08-18 PROCEDURE — 25800030 ZZH RX IP 258 OP 636: Performed by: EMERGENCY MEDICINE

## 2020-08-18 PROCEDURE — 93010 ELECTROCARDIOGRAM REPORT: CPT | Mod: Z6 | Performed by: EMERGENCY MEDICINE

## 2020-08-18 PROCEDURE — 99285 EMERGENCY DEPT VISIT HI MDM: CPT | Mod: 25 | Performed by: EMERGENCY MEDICINE

## 2020-08-18 PROCEDURE — 12000004 ZZH R&B IMCU UMMC

## 2020-08-18 PROCEDURE — 84484 ASSAY OF TROPONIN QUANT: CPT | Performed by: EMERGENCY MEDICINE

## 2020-08-18 PROCEDURE — 83605 ASSAY OF LACTIC ACID: CPT | Performed by: EMERGENCY MEDICINE

## 2020-08-18 PROCEDURE — 00000146 ZZHCL STATISTIC GLUCOSE BY METER IP

## 2020-08-18 PROCEDURE — 96365 THER/PROPH/DIAG IV INF INIT: CPT | Performed by: EMERGENCY MEDICINE

## 2020-08-18 PROCEDURE — 81001 URINALYSIS AUTO W/SCOPE: CPT | Performed by: EMERGENCY MEDICINE

## 2020-08-18 PROCEDURE — 25000128 H RX IP 250 OP 636: Performed by: EMERGENCY MEDICINE

## 2020-08-18 PROCEDURE — 71045 X-RAY EXAM CHEST 1 VIEW: CPT

## 2020-08-18 RX ORDER — NICOTINE POLACRILEX 4 MG
15-30 LOZENGE BUCCAL
Status: DISCONTINUED | OUTPATIENT
Start: 2020-08-18 | End: 2020-08-21 | Stop reason: HOSPADM

## 2020-08-18 RX ORDER — CEFTRIAXONE 2 G/1
2 INJECTION, POWDER, FOR SOLUTION INTRAMUSCULAR; INTRAVENOUS ONCE
Status: COMPLETED | OUTPATIENT
Start: 2020-08-18 | End: 2020-08-18

## 2020-08-18 RX ORDER — POLYETHYLENE GLYCOL 3350 17 G/17G
17 POWDER, FOR SOLUTION ORAL DAILY PRN
Status: DISCONTINUED | OUTPATIENT
Start: 2020-08-18 | End: 2020-08-21 | Stop reason: HOSPADM

## 2020-08-18 RX ORDER — AMOXICILLIN 250 MG
2 CAPSULE ORAL 2 TIMES DAILY PRN
Status: DISCONTINUED | OUTPATIENT
Start: 2020-08-18 | End: 2020-08-21 | Stop reason: HOSPADM

## 2020-08-18 RX ORDER — PREDNISONE 5 MG/1
5 TABLET ORAL DAILY
Status: DISCONTINUED | OUTPATIENT
Start: 2020-08-19 | End: 2020-08-21 | Stop reason: HOSPADM

## 2020-08-18 RX ORDER — ACETAMINOPHEN 650 MG/1
650 SUPPOSITORY RECTAL EVERY 4 HOURS PRN
Status: DISCONTINUED | OUTPATIENT
Start: 2020-08-18 | End: 2020-08-21 | Stop reason: HOSPADM

## 2020-08-18 RX ORDER — LIDOCAINE 40 MG/G
CREAM TOPICAL
Status: DISCONTINUED | OUTPATIENT
Start: 2020-08-18 | End: 2020-08-21 | Stop reason: HOSPADM

## 2020-08-18 RX ORDER — CEFTRIAXONE 2 G/1
2 INJECTION, POWDER, FOR SOLUTION INTRAMUSCULAR; INTRAVENOUS EVERY 24 HOURS
Status: DISCONTINUED | OUTPATIENT
Start: 2020-08-19 | End: 2020-08-21

## 2020-08-18 RX ORDER — ONDANSETRON 4 MG/1
4 TABLET, ORALLY DISINTEGRATING ORAL EVERY 6 HOURS PRN
Status: DISCONTINUED | OUTPATIENT
Start: 2020-08-18 | End: 2020-08-21 | Stop reason: HOSPADM

## 2020-08-18 RX ORDER — ROSUVASTATIN CALCIUM 10 MG/1
10 TABLET, COATED ORAL DAILY
Status: DISCONTINUED | OUTPATIENT
Start: 2020-08-19 | End: 2020-08-21 | Stop reason: HOSPADM

## 2020-08-18 RX ORDER — CLOTRIMAZOLE 10 MG/1
10 LOZENGE ORAL 4 TIMES DAILY
Status: DISCONTINUED | OUTPATIENT
Start: 2020-08-18 | End: 2020-08-21 | Stop reason: HOSPADM

## 2020-08-18 RX ORDER — ACETAMINOPHEN 325 MG/1
650 TABLET ORAL EVERY 4 HOURS PRN
Status: DISCONTINUED | OUTPATIENT
Start: 2020-08-18 | End: 2020-08-21 | Stop reason: HOSPADM

## 2020-08-18 RX ORDER — ALUMINA, MAGNESIA, AND SIMETHICONE 2400; 2400; 240 MG/30ML; MG/30ML; MG/30ML
30 SUSPENSION ORAL EVERY 4 HOURS PRN
Status: DISCONTINUED | OUTPATIENT
Start: 2020-08-18 | End: 2020-08-21 | Stop reason: HOSPADM

## 2020-08-18 RX ORDER — AZITHROMYCIN 250 MG/1
250 TABLET, FILM COATED ORAL EVERY 24 HOURS
Status: DISCONTINUED | OUTPATIENT
Start: 2020-08-19 | End: 2020-08-21

## 2020-08-18 RX ORDER — PROCHLORPERAZINE 25 MG
25 SUPPOSITORY, RECTAL RECTAL EVERY 12 HOURS PRN
Status: DISCONTINUED | OUTPATIENT
Start: 2020-08-18 | End: 2020-08-21 | Stop reason: HOSPADM

## 2020-08-18 RX ORDER — ASPIRIN 81 MG/1
324 TABLET, CHEWABLE ORAL ONCE
Status: COMPLETED | OUTPATIENT
Start: 2020-08-18 | End: 2020-08-18

## 2020-08-18 RX ORDER — HYDRALAZINE HYDROCHLORIDE 25 MG/1
25 TABLET, FILM COATED ORAL 3 TIMES DAILY
Status: DISCONTINUED | OUTPATIENT
Start: 2020-08-19 | End: 2020-08-21 | Stop reason: HOSPADM

## 2020-08-18 RX ORDER — HYDRALAZINE HYDROCHLORIDE 25 MG/1
25 TABLET, FILM COATED ORAL 3 TIMES DAILY
Qty: 270 TABLET | Refills: 3 | Status: ON HOLD | OUTPATIENT
Start: 2020-08-18 | End: 2020-09-23

## 2020-08-18 RX ORDER — PROCHLORPERAZINE MALEATE 10 MG
10 TABLET ORAL EVERY 6 HOURS PRN
Status: DISCONTINUED | OUTPATIENT
Start: 2020-08-18 | End: 2020-08-21 | Stop reason: HOSPADM

## 2020-08-18 RX ORDER — AMLODIPINE BESYLATE 10 MG/1
10 TABLET ORAL AT BEDTIME
Status: DISCONTINUED | OUTPATIENT
Start: 2020-08-18 | End: 2020-08-21 | Stop reason: HOSPADM

## 2020-08-18 RX ORDER — ASPIRIN 81 MG/1
81 TABLET, CHEWABLE ORAL DAILY
Status: DISCONTINUED | OUTPATIENT
Start: 2020-08-19 | End: 2020-08-21 | Stop reason: HOSPADM

## 2020-08-18 RX ORDER — MAGNESIUM OXIDE 400 MG/1
400 TABLET ORAL 2 TIMES DAILY
Status: DISCONTINUED | OUTPATIENT
Start: 2020-08-18 | End: 2020-08-21 | Stop reason: HOSPADM

## 2020-08-18 RX ORDER — PANTOPRAZOLE SODIUM 40 MG/1
40 TABLET, DELAYED RELEASE ORAL
Status: DISCONTINUED | OUTPATIENT
Start: 2020-08-19 | End: 2020-08-21 | Stop reason: HOSPADM

## 2020-08-18 RX ORDER — AMOXICILLIN 250 MG
1 CAPSULE ORAL 2 TIMES DAILY PRN
Status: DISCONTINUED | OUTPATIENT
Start: 2020-08-18 | End: 2020-08-21 | Stop reason: HOSPADM

## 2020-08-18 RX ORDER — ONDANSETRON 2 MG/ML
4 INJECTION INTRAMUSCULAR; INTRAVENOUS EVERY 6 HOURS PRN
Status: DISCONTINUED | OUTPATIENT
Start: 2020-08-18 | End: 2020-08-21 | Stop reason: HOSPADM

## 2020-08-18 RX ORDER — VALGANCICLOVIR 450 MG/1
450 TABLET, FILM COATED ORAL DAILY
Status: DISCONTINUED | OUTPATIENT
Start: 2020-08-19 | End: 2020-08-21 | Stop reason: HOSPADM

## 2020-08-18 RX ORDER — DEXTROSE MONOHYDRATE 25 G/50ML
25-50 INJECTION, SOLUTION INTRAVENOUS
Status: DISCONTINUED | OUTPATIENT
Start: 2020-08-18 | End: 2020-08-21 | Stop reason: HOSPADM

## 2020-08-18 RX ORDER — MYCOPHENOLATE MOFETIL 500 MG/1
1500 TABLET ORAL 2 TIMES DAILY
Status: DISCONTINUED | OUTPATIENT
Start: 2020-08-18 | End: 2020-08-19

## 2020-08-18 RX ADMIN — SODIUM CHLORIDE 1000 ML: 9 INJECTION, SOLUTION INTRAVENOUS at 12:43

## 2020-08-18 RX ADMIN — ACETAMINOPHEN 650 MG: 325 TABLET, FILM COATED ORAL at 23:50

## 2020-08-18 RX ADMIN — MYCOPHENOLATE MOFETIL 1500 MG: 500 TABLET ORAL at 21:54

## 2020-08-18 RX ADMIN — Medication 1 TABLET: at 21:55

## 2020-08-18 RX ADMIN — AMLODIPINE BESYLATE 10 MG: 10 TABLET ORAL at 21:55

## 2020-08-18 RX ADMIN — TACROLIMUS 3.5 MG: 1 CAPSULE ORAL at 21:53

## 2020-08-18 RX ADMIN — MAGNESIUM OXIDE 400 MG: 400 TABLET ORAL at 21:55

## 2020-08-18 RX ADMIN — CLOTRIMAZOLE 10 MG: 10 LOZENGE ORAL at 21:55

## 2020-08-18 RX ADMIN — ASPIRIN 81 MG 324 MG: 81 TABLET ORAL at 23:50

## 2020-08-18 RX ADMIN — CEFTRIAXONE SODIUM 2 G: 2 INJECTION, POWDER, FOR SOLUTION INTRAMUSCULAR; INTRAVENOUS at 15:25

## 2020-08-18 RX ADMIN — AZITHROMYCIN MONOHYDRATE 500 MG: 500 INJECTION, POWDER, LYOPHILIZED, FOR SOLUTION INTRAVENOUS at 16:03

## 2020-08-18 RX ADMIN — INSULIN ASPART 1 UNITS: 100 INJECTION, SOLUTION INTRAVENOUS; SUBCUTANEOUS at 23:40

## 2020-08-18 ASSESSMENT — ENCOUNTER SYMPTOMS
NECK STIFFNESS: 0
FEVER: 1
DIFFICULTY URINATING: 0
ABDOMINAL PAIN: 0
COLOR CHANGE: 0
WEAKNESS: 0
HEADACHES: 0
EYE REDNESS: 0
COUGH: 1
SHORTNESS OF BREATH: 0
ARTHRALGIAS: 0
CONFUSION: 0

## 2020-08-18 ASSESSMENT — MIFFLIN-ST. JEOR
SCORE: 1548.85
SCORE: 1534.33

## 2020-08-18 NOTE — TELEPHONE ENCOUNTER
Called pt this AM to discuss BP medication- see previous note.  When speaking with pt he mentioned he wasn't feeling well- cough, fever of 100.7-101, over all malaise.  Dr. Ortiz notified.  Pt to come to [facility] ER to be evaluated- possible Covid rule out.  Pt agreeable with plan, will drive down this AM.  Report called to [place] [facility]- Dr. Hurd.

## 2020-08-18 NOTE — H&P
[facility], [place]    Cardiology History and Physical - Cards 2    Date of Admission:  8/18/2020    Assessment & Plan: HVSL    Patient has history of CAD (s/p STEMI with ALYSSA to LAD 6/27/18), ICM (LVEF 20-25%) s/p HM3 LVAD (12/31/18), 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     # CAP  Symptoms of new fever, cough, and dyspnea in the setting of retrocardiac opacity concerning for CAP. Hemodynamically stable. Not Hypoxic. COVID negative  Patient provided with ceftriaxone and azithromycin in the ED as well as 1L of NS for hypotension.  - Continue 5 day course of IV Ceftriaxone and PO Azithromycin  - F/u on blood cultures  - Re-check COVID-19 in 72 hours (given fever, cough, shortness of breath - pt meets criteria for NOT LOW suspicion per PUI protocol)    # Status post OHT on 5/18/20, history of ICM  # Chronic immunosuppression  - Rejection history: none.   - Recent immunosuppression changes: none  - Last biopsy: 6/30 NER  - Intolerance to medications: none  - Immunosuppression:   - MMF 1500mg PO BID   - tacrolimus 3.5mg bid goal level 10-12, pending today   - prednisone per taper, currently 10 mg PO BID  - PPx:   - CAV: aspirin 81mg daily, crestor 10mg daily   - PCP: Bactrim stopped 5/27 due to hyperkalemia, pentamidine given 6/30, due 7/28   - CMV: Valcyte 450mg daily (renally dosed), x6 months (through 11/2020)   - Thrush: Nystatin QID   - Osteoporosis: calcium/vitamin D    - GI: pantoprazole 40mg daily    # Hypertension  - Continue PTA Amlodipine 10 mg PO daily  - Continue PTA Hydralazine 25 mg PO TID     # Hypomagnesium  - Magnesium oxide 400 mg PO BID     # DM Type II   # Symptomatic hypoglycemia  - Glargine 28u qpm  - Medium dose correction scale  - Hypoglycemia Protocol       Diet: Cardiac Diet  DVT Prophylaxis: Ambulate every shift  Ana  Catheter: not present  Code Status: FULL  Fluids: No mIVF  Lines: PIV    Entered: Shukri Patterson MD 08/18/2020, 6:50 PM     The patient's care will be staffed in the AM    Shukri Patterson MD  Internal Medicine PGY-2  Pager #: [phone]    ______________________________________________________________________    Chief Complaint   Fever    History is obtained from the patient    History of Present Illness   Patient has history of CAD (s/p STEMI with ALYSSA to LAD 6/27/18), ICM (LVEF 20-25%) s/p HM3 LVAD (12/31/18), 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     Patient developed fever to 100.5 tympanic last night in the setting of new cough productive of sputum and exertional dyspnea. He also reports shortness of breath that is new from his baseline and pleuritic chest pain when he coughs.No leg swelling. No N/V/D. Contacted the cardiology service who instructed them to come to the emergency department for evaluation. No dysuria, urgency, or frequency.    In the ED, labs significant for undetectable troponin, NT-BNP 1673, WBC 3.3, HGB 9.1, , normal electrolytes, Cr 1.77, and UA with 28 WBC and >182 RBC. Blood/urine cultures pending. CXR with increased retrocardiac opacities, possibly representing infection vs atelectasis.      Review of Systems    The 10 point Review of Systems is negative other than noted in the HPI or here.     Past Medical History    I have reviewed this patient's medical history and updated it with pertinent information if needed.   Past Medical History:   Diagnosis Date     Acute kidney injury (H)      CKD (chronic kidney disease)      Coronary artery disease      Diabetes mellitus (H)      HTN (hypertension)      Hyperlipidemia      Ischemic cardiomyopathy      LV (left ventricular) mural thrombus      Paroxysmal atrial flutter (H)      RVF (right  ventricular failure) (H)      Systolic heart failure (H)      Ventricular tachycardia (H)      Past Surgical History   I have reviewed this patient's surgical history and updated it with pertinent information if needed.  Past Surgical History:   Procedure Laterality Date     COLONOSCOPY N/A 12/7/2018    Procedure: COLONOSCOPY;  Surgeon: Krish Mercado MD;  Location: [facility] OR     CV HEART BIOPSY N/A 5/24/2020    Procedure: Heart Biopsy;  Surgeon: Kit Bacon MD;  Location: [facility] HEART CARDIAC CATH LAB     CV HEART BIOPSY N/A 6/1/2020    Procedure: CV HEART BIOPSY;  Surgeon: Kit Bacon MD;  Location: [facility]ARD[facility] CATH [facility]     CV HEART BIOPSY N/A 6/8/2020    Procedure: CV HEART BIOPSY;  Surgeon: Kit Bacon MD;  Location: [facility]     CV HEART BIOPSY N/A 6/15/2020    Procedure: CV HEART BIOPSY;  Surgeon: Kit Bacon MD;  Location: [facility]     CV HEART BIOPSY N/A 6/30/2020    Procedure: CV HEART BIOPSY;  Surgeon: Kit Bacon MD;  [facility]: [facility]     CV HEART BIOPSY N/A 7/14/2020    Procedure: CV HEART BIOPSY;  Surgeon: Brian Long MD;  [facility]: [facility]     CV HEART BIOPSY N/A 7/29/2020    Procedure: CV HEART BIOPSY;  Surgeon: Momo Hunt MD;  Location: [facility]     CV RIGHT HEART CATH N/A 2/19/2019    Procedure: RHC;  Surgeon: Brian Long MD;  Location: [facility]     CV RIGHT HEART CATH N/A 7/5/2019    Procedure: CV RIGHT HEART CATH;  Surgeon: Khris Mcclelland MD;  Location: [facility]     CV RIGHT HEART CATH N/A 5/24/2020    Procedure: Right Heart Cath;  Surgeon: Kit Bacon MD;  [facility]: [facility] LAB     CV RIGHT HEART CATH N/A 6/1/2020    Procedure: CV RIGHT HEART CATH;  Surgeon: Kit Bacon MD;  [facility]: [facility] CARDIAC  CATH LAB     CV RIGHT HEART CATH N/A 6/8/2020    Procedure: CV RIGHT HEART CATH;  Surgeon: Kit Bacon MD;  Location:  HEART CARDIAC CATH LAB     CV RIGHT HEART CATH N/A 6/15/2020    Procedure: CV RIGHT HEART CATH;  Surgeon: Kit Bacon MD;  Location:  HEART CARDIAC CATH LAB     CV RIGHT HEART CATH N/A 6/30/2020    Procedure: CV RIGHT HEART CATH;  Surgeon: Kit Bacon MD;  Location:  HEART CARDIAC CATH LAB     CV RIGHT HEART CATH N/A 7/14/2020    Procedure: CV RIGHT HEART CATH;  Surgeon: Brian Long MD;  Location:  HEART CARDIAC CATH LAB     CV RIGHT HEART CATH N/A 7/29/2020    Procedure: CV RIGHT HEART CATH;  Surgeon: Momo Hunt MD;  Location:  HEART CARDIAC CATH LAB     HC PRQ TRLUML CORONARY ANGIOPLASTY ADDL BRANCH      PCI and ALYSSA to ostial LAD on 6/27/18     IMPLANT AUTOMATIC IMPLANTABLE CARDIOVERTER DEFIBRILLATOR       INSERT VENTRICULAR ASSIST DEVICE LEFT (HEARTMATE II) N/A 12/31/2018    Procedure: Median Sternotomy, On Cardiopulmonary Bypass Pump, Insertion of Left Ventricular Assist Device (Heartmate III);  Surgeon: Kamaljit Baker MD;  Location: [facility] OR     PICC DOUBLE LUMEN PLACEMENT Right 05/22/2020    5FR DL PICC inserted at right basilic vein, length 38cm.     TRANSPLANT HEART RECIPIENT AFTER HEART MATE N/A 5/18/2020    Procedure: REDO MEDIAN STERNOTOMY, LYSIS OF ADHESIONS, ON CARDIOPULMONARY BYPASS PUMP, HEART TRANSPLANT RECIPIENT, REMOVAL OF LEFT VENTRICULAR ASSIST DEVICE, REMOVAL OF AUTOMATED IMPLANTABLE CARDIOVERTER DEFIBRILLATOR;  Surgeon: Elder Willis MD;  [facility]     Social History   I have reviewed this patient's social history and updated it with pertinent information if needed.  Social History     Tobacco Use     Smoking status: Never Smoker     Smokeless tobacco: Never Used   Substance Use Topics     Alcohol use: No     Frequency: Never     Drug use: No     Family History   I have reviewed this  patient's family history and updated it with pertinent information if needed.   No significant family history.     Prior to Admission Medications   Prior to Admission Medications   Prescriptions Last Dose Informant Patient Reported? Taking?   ACCU-CHEK [place] PLUS test strip   No No   Sig: Check BG 3 times daily at meals and at bedtime.   BD SILVANA U/F 32G X 4 MM insulin pen needle   No No   Sig: Use 3-4 daily.   Continuous Blood Gluc  (DEXCOM G6 ) DON   No No   Si each daily   Continuous Blood Gluc Sensor (DEXCOM G6 SENSOR) MISC   No No   Si each every 10 days   Continuous Blood Gluc Transmit (DEXCOM G6 TRANSMITTER) MISC   No No   Si each every 3 months   acetaminophen (TYLENOL) 325 MG tablet prn Self No Yes   Sig: Take 2 tablets (650 mg) by mouth every 4 hours as needed for other (multimodal surgical pain management along with NSAIDS and opioid medication as indicated based on pain control and physical function.)   amLODIPine (NORVASC) 5 MG tablet 2020 at pm  No Yes   Sig: Take 2 tablets (10 mg) by mouth At Bedtime   aspirin (ASA) 81 MG chewable tablet 2020 at am Self No Yes   Sig: Take 1 tablet (81 mg) by mouth daily   calcium carbonate 600 mg-vitamin D 400 units (CALTRATE) 600-400 MG-UNIT per tablet 2020 at am Self No Yes   Sig: Take 1 tablet by mouth 2 times daily (with meals)   clotrimazole 10 MG sushant 2020 at am  No Yes   Sig: Take 1 Sushant (10 mg) by mouth 4 times daily   hydrALAZINE (APRESOLINE) 25 MG tablet 2020 at am  No Yes   Sig: Take 1 tablet (25 mg) by mouth 3 times daily   insulin aspart (NOVOLOG PEN) 100 UNIT/ML pen prn Self No Yes   Sig: Correction Scale - HIGH INSULIN RESISTANCE DOSING     Do Not give Correction Insulin if Pre-Meal BG less than 140.   For Pre-Meal  - 164 give 1 unit.   For Pre-Meal  - 189 give 2 units.   For Pre-Meal  - 214 give 3 units.   For Pre-Meal  - 239 give 4 units.   For Pre-Meal  - 264 give  5 units.   For Pre-Meal  - 289 give 6 units.   For Pre-Meal  - 314 give 7 units.   For Pre-Meal  - 339 give 8 units.   For Pre-Meal  - 364 give 9 units.   For Pre-Meal BG greater than or equal to 365 give 10 units  To be given with prandial insulin, and based on pre-meal blood glucose.   Notify provider if glucose greater than or equal to 350 mg/dL after administration of correction dose. If given at mealtime, administer within 30 minutes of start of meal   insulin aspart (NOVOLOG PEN) 100 UNIT/ML pen prn at   Self No Yes   Sig: Inject 1-7 Units Subcutaneous At Bedtime HIGH INSULIN RESISTANCE DOSING    Do Not give Bedtime Correction Insulin if BG less than 200.   For  - 224 give 1 units.   For  - 249 give 2 units.   For  - 274 give 3 units.   For  - 299 give 4 units.   For  - 324 give 5 units.   For  - 349 give 6 units.   For BG greater than or equal to 350 give 7 units.   Notify provider if glucose greater than or equal to 350 mg/dL after administration of correction dose. If given at mealtime, administer within 30 minutes of start of meal   insulin aspart (NOVOLOG PEN) 100 UNIT/ML pen 2020 at am  Yes Yes   Si unit / 8  gms CHO with meals,plus correction insulin. Pt uses approx 40 units in 24 hrs.   insulin glargine (LANTUS PEN) 100 UNIT/ML pen 2020 at pm  Yes Yes   Sig: Inject 32 Units Subcutaneous every evening Inject 32 units subcutaneous daily.   magnesium oxide (MAG-OX) 400 (241.3 Mg) MG tablet 2020 at am  No Yes   Sig: Take 1 tablet (400 mg) by mouth 2 times daily   mycophenolate (GENERIC EQUIVALENT) 500 MG tablet 2020 at am  No Yes   Sig: TAKE THREE TABLETS BY MOUTH TWICE A DAY   pantoprazole (PROTONIX) 40 MG EC tablet 2020 at am  No Yes   Sig: TAKE ONE TABLET BY MOUTH EVERY MORNING BEFORE BREAKFAST   predniSONE (DELTASONE) 10 MG tablet 2020 at am  No Yes   Sig: Take 0.5 tablets (5 mg) by mouth daily   rosuvastatin  (CRESTOR) 10 MG tablet 8/17/2020 at pm  No Yes   Sig: TAKE ONE TABLET BY MOUTH ONCE DAILY   tacrolimus (GENERIC EQUIVALENT) 0.5 MG capsule 8/18/2020 at am  No Yes   Sig: Take one 0.5mg capsule WITH three 1mg capsules to equal 3.5mg twice a day.   tacrolimus (GENERIC EQUIVALENT) 1 MG capsule 8/18/2020 at am  No Yes   Sig: Take 3 capsules with one 0.5mg capule (3.5 mg) by mouth twice a day.   valGANciclovir (VALCYTE) 450 MG tablet 8/18/2020 at am  No Yes   Sig: TAKE ONE TABLET (450MG) BY MOUTH DAILY      Facility-Administered Medications: None     Allergies   No Known Allergies    Physical Exam   Vital Signs: Temp: 97.9  F (36.6  C) Temp src: Oral BP: 114/74 Pulse: 114 Heart Rate: 114 Resp: 14 SpO2: 97 % O2 Device: None (Room air)    Weight: 176 lbs 0 oz    General:  male, laying in bed, conversant, in NAD  Eyes: Eyes are clear, pupils are reactive, no scleral icterus.  EOMI  Cardiovascular: Tachycardic, no murmur noted. No lower extremity edema.  Respiratory: Clear to auscultation bilaterally. No wheezes or rhonchi appreciated  GI: Soft, non-tender, normal bowel sounds  Skin: Warm and dry, no rashes.   Neurologic: Neck supple. Cranial nerves are grossly intact.  is symmetric. CN II-XII intact, No lateralizing symptoms or focal neurologic deficits    Data   Data reviewed today: I reviewed all medications, new labs and imaging results over the last 24 hours.     Recent Labs   Lab 08/18/20  1231 08/13/20  0840   WBC 3.3* 2.4*   HGB 9.1* 8.9*   MCV 92 92    295    141   POTASSIUM 3.7 4.2   CHLORIDE 104 111*   CO2 24 25   BUN 34* 42*   CR 1.77* 1.76*   ANIONGAP 7 5   ARLENE 8.6 8.7   * 147*   ALBUMIN 2.9* 3.1*   PROTTOTAL 6.8 6.6*   BILITOTAL 0.7 0.4   ALKPHOS 57 55   ALT 16 20   AST 16 18   TROPI <0.015  --      Recent Results (from the past 24 hour(s))   XR Chest Port 1 View    Narrative    EXAM: XR CHEST PORT 1 VW  8/18/2020 1:20 PM     HISTORY:  fever, cough       COMPARISON:  Chest x-ray  6/15/2020, CT chest 7/29/2020    FINDINGS: Single view of the chest. Post surgical changes in the  chest. Sternotomy wires are intact. Stable catheter projecting over  left lung apex.    Trachea is midline. Stable cardiac mediastinal silhouette. Low lung  volumes. Retrocardiac opacities. No large pleural effusion. No  pneumothorax. Visualized upper abdomen is unremarkable.      Impression    IMPRESSION: Increased retrocardiac opacities which may represent  infection versus atelectasis.    I have personally reviewed the examination and initial interpretation  and I agree with the findings.    ALICIA CEJA MD

## 2020-08-18 NOTE — TELEPHONE ENCOUNTER
Call returned to pt regarding BP.  Pt reports low BP following cardiac rehab today- 80/60.  96/72 on recheck at home.  Discussed with Dr. Ortiz ,and Hydralazine decreased to 25mg TID.  Instructions called to pt.  Pt instructed to continue to take BP BID, and report if it does not improve on lowered dose.  Pt states understanding plan of care and instructions.

## 2020-08-18 NOTE — ED PROVIDER NOTES
ED Provider Note  [facility]      History     Chief Complaint   Patient presents with     Fever     Cough     HPI  Mariusz Sharp is a 59 year old male with a history of heart transplant in May of this year who presents to the emergency department with a 1 day history of fever.  Patient states that he developed a fever to 100.5 tympanic last night.  He is also had a cough that is been productive of sputum that he is not like that.  He complains of some mild dyspnea with exertion.  He denies any chest pain.  Denies any leg pain or swelling.  No abdominal pain.  No nausea or vomiting.  Contacted the cardiology service who instructed them to come to the emergency department for evaluation.  Patient denies any dysuria, urgency, or frequency.    The patient was also hypotensive after cardiac rehab yesterday.  His blood pressure was 80/60.  He contacted the cardiology service who decreased his hydralazine.      Past Medical History  Past Medical History:   Diagnosis Date     Acute kidney injury (H)      CKD (chronic kidney disease)      Coronary artery disease      Diabetes mellitus (H)      HTN (hypertension)      Hyperlipidemia      Ischemic cardiomyopathy      LV (left ventricular) mural thrombus      Paroxysmal atrial flutter (H)      RVF (right ventricular failure) (H)      Systolic heart failure (H)      Ventricular tachycardia (H)      Past Surgical History:   Procedure Laterality Date     COLONOSCOPY N/A 12/7/2018    Procedure: COLONOSCOPY;  Surgeon: Krish Mercado MD;  Location: [facility] OR     CV HEART BIOPSY N/A 5/24/2020    Procedure: Heart Biopsy;  Surgeon: Kit Bacon MD;  Location:  HEART CARDIAC CATH LAB     CV HEART BIOPSY N/A 6/1/2020    Procedure: CV HEART BIOPSY;  Surgeon: Kit Bacon MD;  Location:  HEART CARDIAC CATH LAB     CV HEART BIOPSY N/A 6/8/2020    Procedure: CV HEART BIOPSY;  Surgeon: Kit Bacon MD;   Location: [facility] HEART [facility]     CV HEART BIOPSY N/A 6/15/2020    Procedure: CV HEART BIOPSY;  Surgeon: Kit Bacon MD;  [facility]     CV HEART BIOPSY N/A 6/30/2020    Procedure: CV HEART BIOPSY;  Surgeon: Kit Bacon MD;  [facility]ARD[facility] CATH LAB     CV HEART BIOPSY N/A 7/14/2020    Procedure: CV HEART BIOPSY;  Surgeon: Brian Long MD;  [facility]     CV HEART BIOPSY N/A 7/29/2020    Procedure: CV HEART BIOPSY;  Surgeon: Momo Hunt MD;  Location:  HEART CARDIAC CATH LAB     CV RIGHT HEART CATH N/A 2/19/2019    Procedure: RHC;  Surgeon: Brian Long MD;  Location:  HEART CARDIAC CATH LAB     CV RIGHT HEART CATH N/A 7/5/2019    Procedure: CV RIGHT HEART CATH;  Surgeon: Khris Mcclelland MD;  Location:  HEART CARDIAC CATH LAB     CV RIGHT HEART CATH N/A 5/24/2020    Procedure: Right Heart Cath;  Surgeon: Kit Bacon MD;  Location:  HEART CARDIAC CATH LAB     CV RIGHT HEART CATH N/A 6/1/2020    Procedure: CV RIGHT HEART CATH;  Surgeon: Kit Bacon MD;  Location:  HEART CARDIAC CATH LAB     CV RIGHT HEART CATH N/A 6/8/2020    Procedure: CV RIGHT HEART CATH;  Surgeon: Kit Bacon MD;  Location:  HEART CARDIAC CATH LAB     CV RIGHT HEART CATH N/A 6/15/2020    Procedure: CV RIGHT HEART CATH;  Surgeon: Kit Bacon MD;  Location:  HEART CARDIAC CATH LAB     CV RIGHT HEART CATH N/A 6/30/2020    Procedure: CV RIGHT HEART CATH;  Surgeon: Kit Bacon MD;  Location:  HEART CARDIAC CATH LAB     CV RIGHT HEART CATH N/A 7/14/2020    Procedure: CV RIGHT HEART CATH;  Surgeon: Brian Long MD;  Location:  HEART CARDIAC CATH LAB     CV RIGHT HEART CATH N/A 7/29/2020    Procedure: CV RIGHT HEART CATH;  Surgeon: Momo Hunt MD;  Location:  HEART CARDIAC CATH LAB     [facility]  TRLUML CORONARY ANGIOPLASTY ADDL BRANCH      PCI and ALYSSA to ostial LAD on 6/27/18     IMPLANT AUTOMATIC IMPLANTABLE CARDIOVERTER DEFIBRILLATOR       INSERT VENTRICULAR ASSIST DEVICE LEFT (HEARTMATE II) N/A 12/31/2018    Procedure: Median Sternotomy, On Cardiopulmonary Bypass Pump, Insertion of Left Ventricular Assist Device (Heartmate III);  Surgeon: Kamaljit Baker MD;  Location: [facility] OR     PICC DOUBLE LUMEN PLACEMENT Right 05/22/2020    5FR DL PICC inserted at right basilic vein, length 38cm.     TRANSPLANT HEART RECIPIENT AFTER HEART MATE N/A 5/18/2020    Procedure: REDO MEDIAN STERNOTOMY, LYSIS OF ADHESIONS, ON CARDIOPULMONARY BYPASS PUMP, HEART TRANSPLANT RECIPIENT, REMOVAL OF LEFT VENTRICULAR ASSIST DEVICE, REMOVAL OF AUTOMATED IMPLANTABLE CARDIOVERTER DEFIBRILLATOR;  Surgeon: Elder Willis MD;  Location: [facility] OR     acetaminophen (TYLENOL) 325 MG tablet  amLODIPine (NORVASC) 5 MG tablet  aspirin (ASA) 81 MG chewable tablet  calcium carbonate 600 mg-vitamin D 400 units (CALTRATE) 600-400 MG-UNIT per tablet  clotrimazole 10 MG sushant  hydrALAZINE (APRESOLINE) 25 MG tablet  insulin aspart (NOVOLOG PEN) 100 UNIT/ML pen  insulin aspart (NOVOLOG PEN) 100 UNIT/ML pen  insulin aspart (NOVOLOG PEN) 100 UNIT/ML pen  insulin glargine (LANTUS PEN) 100 UNIT/ML pen  magnesium oxide (MAG-OX) 400 (241.3 Mg) MG tablet  mycophenolate (GENERIC EQUIVALENT) 500 MG tablet  pantoprazole (PROTONIX) 40 MG EC tablet  predniSONE (DELTASONE) 10 MG tablet  rosuvastatin (CRESTOR) 10 MG tablet  tacrolimus (GENERIC EQUIVALENT) 0.5 MG capsule  tacrolimus (GENERIC EQUIVALENT) 1 MG capsule  valGANciclovir (VALCYTE) 450 MG tablet  ACCU-CHEK CHARLOTTE PLUS test strip  BD SILVANA U/F 32G X 4 MM insulin pen needle  Continuous Blood Gluc  (DEXCOM G6 ) DON  Continuous Blood Gluc Sensor (DEXCOM G6 SENSOR) MISC  Continuous Blood Gluc Transmit (DEXCOM G6 TRANSMITTER) MISC      No Known Allergies  Past medical history, past surgical  "history, medications, and allergies were reviewed with the patient. Additional pertinent items: None    Family History  No family history on file.  Family history was reviewed with the patient. Additional pertinent items: None    Social History  Social History     Tobacco Use     Smoking status: Never Smoker     Smokeless tobacco: Never Used   Substance Use Topics     Alcohol use: No     Frequency: Never     Drug use: No      Social history was reviewed with the patient. Additional pertinent items: None    Review of Systems   Constitutional: Positive for fever.   HENT: Negative for congestion.    Eyes: Negative for redness.   Respiratory: Positive for cough. Negative for shortness of breath.    Cardiovascular: Negative for chest pain.   Gastrointestinal: Negative for abdominal pain.   Genitourinary: Negative for difficulty urinating.   Musculoskeletal: Negative for arthralgias and neck stiffness.   Skin: Negative for color change.   Neurological: Negative for weakness and headaches.   Psychiatric/Behavioral: Negative for confusion.   All other systems reviewed and are negative.    A complete review of systems was performed with pertinent positives and negatives noted in the HPI, and all other systems negative.    Physical Exam   BP: 104/71  Heart Rate: 127  Temp: 97.9  F (36.6  C)  Resp: 16  Height: 162.6 cm (5' 4\")  Weight: 79.8 kg (176 lb)  SpO2: 97 %  Physical Exam  Vitals signs and nursing note reviewed.   Constitutional:       General: He is not in acute distress.     Appearance: Normal appearance. He is not diaphoretic.   HENT:      Head: Normocephalic and atraumatic.   Eyes:      General: No scleral icterus.     Pupils: Pupils are equal, round, and reactive to light.   Cardiovascular:      Rate and Rhythm: Tachycardia present.      Pulses: Normal pulses.   Pulmonary:      Effort: Pulmonary effort is normal. No respiratory distress.   Abdominal:      Palpations: Abdomen is soft.      Tenderness: There is no " abdominal tenderness.   Musculoskeletal: Normal range of motion.         General: No tenderness.   Skin:     General: Skin is warm.      Findings: No rash.   Neurological:      General: No focal deficit present.      Mental Status: He is alert.         ED Course      Procedures             EKG Interpretation:      Interpreted by CORY SALDANA MD, MD  Time reviewed: 1235  Symptoms at time of EKG: SOA   Rhythm: sinus tachycardia  Rate: 115  Axis: Normal  Ectopy: none  Conduction: right bundle branch block (incomplete)  ST Segments/ T Waves: No acute ischemic changes  Q Waves: none  Comparison to prior: Unchanged from 8/13/20    Clinical Impression: no acute changes    Results for orders placed or performed during the hospital encounter of 08/18/20   XR Chest Port 1 View     Status: None    Narrative    EXAM: XR CHEST PORT 1 VW  8/18/2020 1:20 PM     HISTORY:  fever, cough       COMPARISON:  Chest x-ray 6/15/2020, CT chest 7/29/2020    FINDINGS: Single view of the chest. Post surgical changes in the  chest. Sternotomy wires are intact. Stable catheter projecting over  left lung apex.    Trachea is midline. Stable cardiac mediastinal silhouette. Low lung  volumes. Retrocardiac opacities. No large pleural effusion. No  pneumothorax. Visualized upper abdomen is unremarkable.      Impression    IMPRESSION: Increased retrocardiac opacities which may represent  infection versus atelectasis.    I have personally reviewed the examination and initial interpretation  and I agree with the findings.    ALICIA CEJA MD   CBC with platelets differential     Status: Abnormal   Result Value Ref Range    WBC 3.3 (L) 4.0 - 11.0 10e9/L    RBC Count 3.30 (L) 4.4 - 5.9 10e12/L    Hemoglobin 9.1 (L) 13.3 - 17.7 g/dL    Hematocrit 30.2 (L) 40.0 - 53.0 %    MCV 92 78 - 100 fl    MCH 27.6 26.5 - 33.0 pg    MCHC 30.1 (L) 31.5 - 36.5 g/dL    RDW 17.2 (H) 10.0 - 15.0 %    Platelet Count 271 150 - 450 10e9/L    Diff Method Manual Differential      % Neutrophils 79.2 %    % Lymphocytes 7.8 %    % Monocytes 11.3 %    % Eosinophils 0.0 %    % Basophils 1.7 %    Absolute Neutrophil 2.6 1.6 - 8.3 10e9/L    Absolute Lymphocytes 0.3 (L) 0.8 - 5.3 10e9/L    Absolute Monocytes 0.4 0.0 - 1.3 10e9/L    Absolute Eosinophils 0.0 0.0 - 0.7 10e9/L    Absolute Basophils 0.1 0.0 - 0.2 10e9/L    Anisocytosis Slight     Poikilocytosis Slight     Platelet Estimate Confirming automated cell count    Comprehensive metabolic panel     Status: Abnormal   Result Value Ref Range    Sodium 135 133 - 144 mmol/L    Potassium 3.7 3.4 - 5.3 mmol/L    Chloride 104 94 - 109 mmol/L    Carbon Dioxide 24 20 - 32 mmol/L    Anion Gap 7 3 - 14 mmol/L    Glucose 123 (H) 70 - 99 mg/dL    Urea Nitrogen 34 (H) 7 - 30 mg/dL    Creatinine 1.77 (H) 0.66 - 1.25 mg/dL    GFR Estimate 41 (L) >60 mL/min/[1.73_m2]    GFR Estimate If Black 48 (L) >60 mL/min/[1.73_m2]    Calcium 8.6 8.5 - 10.1 mg/dL    Bilirubin Total 0.7 0.2 - 1.3 mg/dL    Albumin 2.9 (L) 3.4 - 5.0 g/dL    Protein Total 6.8 6.8 - 8.8 g/dL    Alkaline Phosphatase 57 40 - 150 U/L    ALT 16 0 - 70 U/L    AST 16 0 - 45 U/L   Lactic acid whole blood     Status: None   Result Value Ref Range    Lactic Acid 0.9 0.7 - 2.0 mmol/L   UA with Microscopic     Status: Abnormal   Result Value Ref Range    Color Urine Yellow     Appearance Urine Slightly Cloudy     Glucose Urine Negative NEG^Negative mg/dL    Bilirubin Urine Negative NEG^Negative    Ketones Urine Negative NEG^Negative mg/dL    Specific Gravity Urine 1.018 1.003 - 1.035    Blood Urine Large (A) NEG^Negative    pH Urine 5.5 5.0 - 7.0 pH    Protein Albumin Urine 30 (A) NEG^Negative mg/dL    Urobilinogen mg/dL Normal 0.0 - 2.0 mg/dL    Nitrite Urine Negative NEG^Negative    Leukocyte Esterase Urine Moderate (A) NEG^Negative    Source Clean catch urine     WBC Urine 28 (H) 0 - 5 /HPF    RBC Urine >182 (H) 0 - 2 /HPF    Squamous Epithelial /HPF Urine <1 0 - 1 /HPF    Mucous Urine Present (A)  NEG^Negative /LPF    Hyaline Casts 1 0 - 2 /LPF   Symptomatic COVID-19 Virus (Coronavirus) by PCR     Status: None    Specimen: Nasopharyngeal   Result Value Ref Range    COVID-19 Virus PCR to [facility] - Source Nasopharyngeal     COVID-19 Virus PCR to [facility] - Result       Test received-See reflex to IDDL test SARS CoV2 (COVID-19) Virus RT-PCR   Troponin I     Status: None   Result Value Ref Range    Troponin I ES <0.015 0.000 - 0.045 ug/L   Nt probnp inpatient     Status: Abnormal   Result Value Ref Range    N-Terminal Pro BNP Inpatient 1,673 (H) 0 - 900 pg/mL   SARS-CoV-2 COVID-19 Virus (Coronavirus) RT-PCR Nasopharyngeal     Status: None    Specimen: Nasopharyngeal   Result Value Ref Range    SARS-CoV-2 Virus Specimen Source Nasopharyngeal     SARS-CoV-2 PCR Result NEGATIVE     SARS-CoV-2 PCR Comment       Testing was performed using the Xpert Xpress SARS-CoV-2 Assay on the Cepheid Gene-Xpert   Instrument Systems. Additional information about this Emergency Use Authorization (EUA)   assay can be found via the Lab Guide.     EKG 12-lead, tracing only     Status: None   Result Value Ref Range    Interpretation ECG Click View Image link to view waveform and result    Blood culture     Status: None (Preliminary result)    Specimen: Arm, Left; Blood    Left Arm   Result Value Ref Range    Specimen Description Blood Left Arm     Culture Micro PENDING              Medications   azithromycin (ZITHROMAX) 500 mg in sodium chloride 0.9 % 250 mL intermittent infusion (500 mg Intravenous New Bag 8/18/20 1603)   0.9% sodium chloride BOLUS (0 mLs Intravenous Stopped 8/18/20 1344)   cefTRIAXone (ROCEPHIN) 2 g vial to attach to  ml bag for ADULTS or NS 50 ml bag for PEDS (0 g Intravenous Stopped 8/18/20 1604)        Assessments & Plan (with Medical Decision Making)   59 year old male with history of lung transplant approximately 3 months ago to the emergency department with 1 day history of fever and productive cough.   Patient complains of dyspnea on exertion.  He was hypotensive yesterday after cardiac rehab.  His hydralazine dose was subsequently decreased.  The patient arrives to the emergency department more tachycardic than usual.  He complains of dyspnea on exertion upon walking into the emergency department.  He is not hypoxic.  The patient has retrocardiac opacity on his chest radiograph concerning for pneumonia.  His white blood cell count 3300 today.  Creatinine is stable at 1.77.  He does have white cells and red cells in his urine as well.  COVID-19 testing is negative.  The patient was given ceftriaxone and azithromycin for community-acquired pneumonia.  Blood cultures have been obtained.  His lactate level is normal.  The patient was accepted for hospital admission by the cardiology 2 service, Dr. [place].    I have reviewed the nursing notes. I have reviewed the findings, diagnosis, plan and need for follow up with the patient.    New Prescriptions    No medications on file       Final diagnoses:   Community acquired pneumonia of left lower lobe of lung       --  DAVIS SALDANA MD, MD   Emergency Medicine   [facility], EMERGENCY DEPARTMENT  8/18/2020     Davis Saldana MD  08/18/20 1503       Davis Saldana MD  08/18/20 2892

## 2020-08-18 NOTE — PHARMACY-ADMISSION MEDICATION HISTORY
Admission Medication History Completed by Pharmacy    See [facility] Admission Navigator for allergy information, preferred outpatient pharmacy, prior to admission medications and immunization status.     Medication History Sources:     Patient, Care Everywhere, Sure Scripts    Changes made to PTA medication list (reason):    Added: None    Deleted: None    Changed: None    Additional Information:    Pt is a great historian, He knew all his meds & last doses taken.    Prior to Admission medications    Medication Sig Last Dose Taking? Auth Provider   acetaminophen (TYLENOL) 325 MG tablet Take 2 tablets (650 mg) by mouth every 4 hours as needed for other (multimodal surgical pain management along with NSAIDS and opioid medication as indicated based on pain control and physical function.) prn Yes Damari Ohara APRN CNP   amLODIPine (NORVASC) 5 MG tablet Take 2 tablets (10 mg) by mouth At Bedtime 8/17/2020 at pm Yes Jenni Ortiz MD   aspirin (ASA) 81 MG chewable tablet Take 1 tablet (81 mg) by mouth daily 8/18/2020 at am Yes Jenni Ortiz MD   calcium carbonate 600 mg-vitamin D 400 units (CALTRATE) 600-400 MG-UNIT per tablet Take 1 tablet by mouth 2 times daily (with meals) 8/18/2020 at am Yes Damari Ohara APRN CNP   clotrimazole 10 MG sushant Take 1 Sushant (10 mg) by mouth 4 times daily 8/18/2020 at am Yes Marcy Harper APRN CNP   hydrALAZINE (APRESOLINE) 25 MG tablet Take 1 tablet (25 mg) by mouth 3 times daily 8/18/2020 at am Yes Jenni Ortiz MD   insulin aspart (NOVOLOG PEN) 100 UNIT/ML pen 1 unit / 8  gms CHO with meals,plus correction insulin. Pt uses approx 40 units in 24 hrs. 8/18/2020 at am Yes Jeannette Schafer PA-C   insulin aspart (NOVOLOG PEN) 100 UNIT/ML pen Correction Scale - HIGH INSULIN RESISTANCE DOSING     Do Not give Correction Insulin if Pre-Meal BG less than 140.   For Pre-Meal  - 164 give 1 unit.   For Pre-Meal  - 189 give 2 units.   For Pre-Meal BG  190 - 214 give 3 units.   For Pre-Meal  - 239 give 4 units.   For Pre-Meal  - 264 give 5 units.   For Pre-Meal  - 289 give 6 units.   For Pre-Meal  - 314 give 7 units.   For Pre-Meal  - 339 give 8 units.   For Pre-Meal  - 364 give 9 units.   For Pre-Meal BG greater than or equal to 365 give 10 units  To be given with prandial insulin, and based on pre-meal blood glucose.   Notify provider if glucose greater than or equal to 350 mg/dL after administration of correction dose. If given at mealtime, administer within 30 minutes of start of meal prn Yes Damari Ohara APRN CNP   insulin aspart (NOVOLOG PEN) 100 UNIT/ML pen Inject 1-7 Units Subcutaneous At Bedtime HIGH INSULIN RESISTANCE DOSING    Do Not give Bedtime Correction Insulin if BG less than 200.   For  - 224 give 1 units.   For  - 249 give 2 units.   For  - 274 give 3 units.   For  - 299 give 4 units.   For  - 324 give 5 units.   For  - 349 give 6 units.   For BG greater than or equal to 350 give 7 units.   Notify provider if glucose greater than or equal to 350 mg/dL after administration of correction dose. If given at mealtime, administer within 30 minutes of start of meal prn Yes Damari Ohara APRN CNP   insulin glargine (LANTUS PEN) 100 UNIT/ML pen Inject 32 Units Subcutaneous every evening Inject 32 units subcutaneous daily. 8/17/2020 at pm Yes Jeannette Schafer PA-C   magnesium oxide (MAG-OX) 400 (241.3 Mg) MG tablet Take 1 tablet (400 mg) by mouth 2 times daily 8/18/2020 at am Yes Marcy Harper APRN CNP   mycophenolate (GENERIC EQUIVALENT) 500 MG tablet TAKE THREE TABLETS BY MOUTH TWICE A DAY 8/18/2020 at am Yes Jenni Ortiz MD   pantoprazole (PROTONIX) 40 MG EC tablet TAKE ONE TABLET BY MOUTH EVERY MORNING BEFORE BREAKFAST 8/18/2020 at am Yes Jenni Ortiz MD   predniSONE (DELTASONE) 10 MG tablet Take 0.5 tablets (5 mg) by mouth daily 8/18/2020 at am Yes  Jenni Ortiz MD   rosuvastatin (CRESTOR) 10 MG tablet TAKE ONE TABLET BY MOUTH ONCE DAILY 8/17/2020 at pm Yes Jenni Ortiz MD   tacrolimus (GENERIC EQUIVALENT) 0.5 MG capsule Take one 0.5mg capsule WITH three 1mg capsules to equal 3.5mg twice a day. 8/18/2020 at am Yes Jenni Ortiz MD   tacrolimus (GENERIC EQUIVALENT) 1 MG capsule Take 3 capsules with one 0.5mg capule (3.5 mg) by mouth twice a day. 8/18/2020 at am Yes Jenni Ortiz MD   valGANciclovir (VALCYTE) 450 MG tablet TAKE ONE TABLET (450MG) BY MOUTH DAILY 8/18/2020 at am Yes Jenni Ortiz MD   ACCU-CHEK CHARLOTTE PLUS test strip Check BG 3 times daily at meals and at bedtime.   Jenni Ortiz MD   BD SILVANA U/F 32G X 4 MM insulin pen needle Use 3-4 daily.   Jeannette Schafer PA-C   Continuous Blood Gluc  (DEXCOM G6 ) DON 1 each daily   Jeannette Schafer PA-C   Continuous Blood Gluc Sensor (DEXCOM G6 SENSOR) MISC 1 each every 10 days   Jeannette Schafer PA-C   Continuous Blood Gluc Transmit (DEXCOM G6 TRANSMITTER) MISC 1 each every 3 months   Jeannette Schafer PA-C       Medication history completed by:   Priti Bolden, PharmD  August 18, 2020

## 2020-08-18 NOTE — ED TRIAGE NOTES
Coming in with 8 hour history of fever. Tmax at home 100.5. Heart transplant 5/2020. Instructed to get Covid test per md.

## 2020-08-18 NOTE — ED NOTES
[facility], [place]   ED Nurse to Floor Handoff     Mariusz Sharp is a 59 year old male who speaks English and lives alone,  in a home  They arrived in the ED by car from home    ED Chief Complaint: Fever and Cough    ED Dx;   Final diagnoses:   Community acquired pneumonia of left lower lobe of lung         Needed?: No    Allergies: No Known Allergies.  Past Medical Hx:   Past Medical History:   Diagnosis Date     Acute kidney injury (H)      CKD (chronic kidney disease)      Coronary artery disease      Diabetes mellitus (H)      HTN (hypertension)      Hyperlipidemia      Ischemic cardiomyopathy      LV (left ventricular) mural thrombus      Paroxysmal atrial flutter (H)      RVF (right ventricular failure) (H)      Systolic heart failure (H)      Ventricular tachycardia (H)       Baseline Mental status: WDL  Current Mental Status changes: at basesline    Infection present or suspected this encounter: yes respiratory  Sepsis suspected: No  Isolation type: Special Precautions-COVID-19     Activity level - Baseline/Home:  Independent  Activity Level - Current:   Independent    Bariatric equipment needed?: No    In the ED these meds were given:   Medications   cefTRIAXone (ROCEPHIN) 2 g vial to attach to  ml bag for ADULTS or NS 50 ml bag for PEDS (has no administration in time range)   azithromycin (ZITHROMAX) 500 mg in sodium chloride 0.9 % 250 mL intermittent infusion (has no administration in time range)   0.9% sodium chloride BOLUS (0 mLs Intravenous Stopped 8/18/20 1344)       Drips running?  No    Home pump  Yes    Current LDAs  Peripheral IV 08/18/20 Left Lower forearm (Active)   Number of days: 0       Rash 05/12/20 2308 back (Active)   Number of days: 98       Incision/Surgical Site 12/31/18 Left;Upper Chest (Active)   Number of days: 596       Incision/Surgical Site 05/18/20 Midline Chest (Active)   Number of days: 92       Incision/Surgical Site Left;Lateral  Chest (Active)   Number of days:        Labs results:   Labs Ordered and Resulted from Time of ED Arrival Up to the Time of Departure from the ED   CBC WITH PLATELETS DIFFERENTIAL - Abnormal; Notable for the following components:       Result Value    WBC 3.3 (*)     RBC Count 3.30 (*)     Hemoglobin 9.1 (*)     Hematocrit 30.2 (*)     MCHC 30.1 (*)     RDW 17.2 (*)     Absolute Lymphocytes 0.3 (*)     All other components within normal limits   COMPREHENSIVE METABOLIC PANEL - Abnormal; Notable for the following components:    Glucose 123 (*)     Urea Nitrogen 34 (*)     Creatinine 1.77 (*)     GFR Estimate 41 (*)     GFR Estimate If Black 48 (*)     Albumin 2.9 (*)     All other components within normal limits   ROUTINE UA WITH MICROSCOPIC - Abnormal; Notable for the following components:    Blood Urine Large (*)     Protein Albumin Urine 30 (*)     Leukocyte Esterase Urine Moderate (*)     WBC Urine 28 (*)     RBC Urine >182 (*)     Mucous Urine Present (*)     All other components within normal limits   NT PROBNP INPATIENT - Abnormal; Notable for the following components:    N-Terminal Pro BNP Inpatient 1,673 (*)     All other components within normal limits   LACTIC ACID WHOLE BLOOD   COVID-19 VIRUS (CORONAVIRUS) BY PCR   TROPONIN I   SARS-COV-2 (COVID-19) VIRUS RT-PCR   PERIPHERAL IV CATHETER   BLOOD CULTURE   BLOOD CULTURE   URINE CULTURE AEROBIC BACTERIAL       Imaging Studies:   Recent Results (from the past 24 hour(s))   XR Chest Port 1 View    Narrative    EXAM: XR CHEST PORT 1 VW  8/18/2020 1:20 PM     HISTORY:  fever, cough       COMPARISON:  Chest x-ray 6/15/2020, CT chest 7/29/2020    FINDINGS: Single view of the chest. Post surgical changes in the  chest. Sternotomy wires are intact. Stable catheter projecting over  left lung apex.    Trachea is midline. Stable cardiac mediastinal silhouette. Low lung  volumes. Retrocardiac opacities. No large pleural effusion. No  pneumothorax. Visualized upper  "abdomen is unremarkable.      Impression    IMPRESSION: Increased retrocardiac opacities which may represent  infection versus atelectasis.    I have personally reviewed the examination and initial interpretation  and I agree with the findings.    ALICIA CEJA MD       Recent vital signs:   /71   Temp 97.9  F (36.6  C) (Oral)   Resp 14   Ht 1.626 m (5' 4\")   Wt 79.8 kg (176 lb)   SpO2 97%   BMI 30.21 kg/m      [place] Coma Scale Score: 15 (08/18/20 1210)       Cardiac Rhythm: Tachycardia  Pt needs tele? Yes  Skin/wound Issues: None    Code Status: Full Code    Pain control: pt had none    Nausea control: pt had none    Abnormal labs/tests/findings requiring intervention:     Family present during ED course? No   Family Comments/Social Situation comments:     Tasks needing completion: None    Feli Rodriguez, RN  [phone] [facility]    "

## 2020-08-19 PROBLEM — R57.0 CARDIOGENIC SHOCK (H): Status: RESOLVED | Noted: 2018-07-02 | Resolved: 2020-08-19

## 2020-08-19 PROBLEM — Z79.2 PROPHYLACTIC ANTIBIOTIC: Status: ACTIVE | Noted: 2020-08-19

## 2020-08-19 PROBLEM — I50.9 HEART FAILURE (H): Status: RESOLVED | Noted: 2018-07-16 | Resolved: 2020-08-19

## 2020-08-19 PROBLEM — Z95.811 LVAD (LEFT VENTRICULAR ASSIST DEVICE) PRESENT (H): Status: RESOLVED | Noted: 2019-01-17 | Resolved: 2020-08-19

## 2020-08-19 PROBLEM — R50.9 FEVER IN ADULT: Status: ACTIVE | Noted: 2020-08-19

## 2020-08-19 PROBLEM — Z95.810 ICD (IMPLANTABLE CARDIOVERTER-DEFIBRILLATOR), BIVENTRICULAR, IN SITU: Status: RESOLVED | Noted: 2018-07-17 | Resolved: 2020-08-19

## 2020-08-19 LAB
ANION GAP SERPL CALCULATED.3IONS-SCNC: 6 MMOL/L (ref 3–14)
BACTERIA SPEC CULT: NORMAL
BUN SERPL-MCNC: 27 MG/DL (ref 7–30)
CALCIUM SERPL-MCNC: 8.4 MG/DL (ref 8.5–10.1)
CHLORIDE SERPL-SCNC: 107 MMOL/L (ref 94–109)
CO2 SERPL-SCNC: 25 MMOL/L (ref 20–32)
CREAT SERPL-MCNC: 1.31 MG/DL (ref 0.66–1.25)
ERYTHROCYTE [DISTWIDTH] IN BLOOD BY AUTOMATED COUNT: 16.9 % (ref 10–15)
GFR SERPL CREATININE-BSD FRML MDRD: 60 ML/MIN/{1.73_M2}
GLUCOSE SERPL-MCNC: 69 MG/DL (ref 70–99)
HBA1C MFR BLD: 7.4 % (ref 0–5.6)
HCT VFR BLD AUTO: 29.3 % (ref 40–53)
HGB BLD-MCNC: 8.7 G/DL (ref 13.3–17.7)
LDH SERPL L TO P-CCNC: 215 U/L (ref 85–227)
Lab: NORMAL
MCH RBC QN AUTO: 27.3 PG (ref 26.5–33)
MCHC RBC AUTO-ENTMCNC: 29.7 G/DL (ref 31.5–36.5)
MCV RBC AUTO: 92 FL (ref 78–100)
PLATELET # BLD AUTO: 250 10E9/L (ref 150–450)
POTASSIUM SERPL-SCNC: 3.6 MMOL/L (ref 3.4–5.3)
PSA SERPL-MCNC: 10.8 UG/L (ref 0–4)
RBC # BLD AUTO: 3.19 10E12/L (ref 4.4–5.9)
SODIUM SERPL-SCNC: 139 MMOL/L (ref 133–144)
SPECIMEN SOURCE: NORMAL
WBC # BLD AUTO: 2.5 10E9/L (ref 4–11)

## 2020-08-19 PROCEDURE — 85027 COMPLETE CBC AUTOMATED: CPT | Performed by: STUDENT IN AN ORGANIZED HEALTH CARE EDUCATION/TRAINING PROGRAM

## 2020-08-19 PROCEDURE — 36415 COLL VENOUS BLD VENIPUNCTURE: CPT | Performed by: STUDENT IN AN ORGANIZED HEALTH CARE EDUCATION/TRAINING PROGRAM

## 2020-08-19 PROCEDURE — 83036 HEMOGLOBIN GLYCOSYLATED A1C: CPT | Performed by: INTERNAL MEDICINE

## 2020-08-19 PROCEDURE — 87633 RESP VIRUS 12-25 TARGETS: CPT | Performed by: STUDENT IN AN ORGANIZED HEALTH CARE EDUCATION/TRAINING PROGRAM

## 2020-08-19 PROCEDURE — 87581 M.PNEUMON DNA AMP PROBE: CPT | Performed by: STUDENT IN AN ORGANIZED HEALTH CARE EDUCATION/TRAINING PROGRAM

## 2020-08-19 PROCEDURE — 25000132 ZZH RX MED GY IP 250 OP 250 PS 637: Performed by: STUDENT IN AN ORGANIZED HEALTH CARE EDUCATION/TRAINING PROGRAM

## 2020-08-19 PROCEDURE — 80048 BASIC METABOLIC PNL TOTAL CA: CPT | Performed by: STUDENT IN AN ORGANIZED HEALTH CARE EDUCATION/TRAINING PROGRAM

## 2020-08-19 PROCEDURE — 25000131 ZZH RX MED GY IP 250 OP 636 PS 637: Performed by: STUDENT IN AN ORGANIZED HEALTH CARE EDUCATION/TRAINING PROGRAM

## 2020-08-19 PROCEDURE — 87486 CHLMYD PNEUM DNA AMP PROBE: CPT | Performed by: STUDENT IN AN ORGANIZED HEALTH CARE EDUCATION/TRAINING PROGRAM

## 2020-08-19 PROCEDURE — 84153 ASSAY OF PSA TOTAL: CPT | Performed by: STUDENT IN AN ORGANIZED HEALTH CARE EDUCATION/TRAINING PROGRAM

## 2020-08-19 PROCEDURE — 83615 LACTATE (LD) (LDH) ENZYME: CPT | Performed by: STUDENT IN AN ORGANIZED HEALTH CARE EDUCATION/TRAINING PROGRAM

## 2020-08-19 PROCEDURE — 99233 SBSQ HOSP IP/OBS HIGH 50: CPT | Mod: GC | Performed by: INTERNAL MEDICINE

## 2020-08-19 PROCEDURE — 87086 URINE CULTURE/COLONY COUNT: CPT | Performed by: STUDENT IN AN ORGANIZED HEALTH CARE EDUCATION/TRAINING PROGRAM

## 2020-08-19 PROCEDURE — 25000128 H RX IP 250 OP 636: Performed by: STUDENT IN AN ORGANIZED HEALTH CARE EDUCATION/TRAINING PROGRAM

## 2020-08-19 PROCEDURE — 87449 NOS EACH ORGANISM AG IA: CPT | Performed by: STUDENT IN AN ORGANIZED HEALTH CARE EDUCATION/TRAINING PROGRAM

## 2020-08-19 PROCEDURE — 12000004 ZZH R&B IMCU UMMC

## 2020-08-19 PROCEDURE — 87799 DETECT AGENT NOS DNA QUANT: CPT | Performed by: STUDENT IN AN ORGANIZED HEALTH CARE EDUCATION/TRAINING PROGRAM

## 2020-08-19 RX ORDER — NICOTINE POLACRILEX 4 MG
15-30 LOZENGE BUCCAL
Status: DISCONTINUED | OUTPATIENT
Start: 2020-08-19 | End: 2020-08-19

## 2020-08-19 RX ORDER — MYCOPHENOLATE MOFETIL 500 MG/1
1000 TABLET ORAL 2 TIMES DAILY
Status: DISCONTINUED | OUTPATIENT
Start: 2020-08-19 | End: 2020-08-21 | Stop reason: HOSPADM

## 2020-08-19 RX ORDER — DEXTROSE MONOHYDRATE 25 G/50ML
25-50 INJECTION, SOLUTION INTRAVENOUS
Status: DISCONTINUED | OUTPATIENT
Start: 2020-08-19 | End: 2020-08-19

## 2020-08-19 RX ADMIN — TACROLIMUS 3.5 MG: 1 CAPSULE ORAL at 18:24

## 2020-08-19 RX ADMIN — HYDRALAZINE HYDROCHLORIDE 25 MG: 25 TABLET ORAL at 15:12

## 2020-08-19 RX ADMIN — MYCOPHENOLATE MOFETIL 1000 MG: 500 TABLET ORAL at 19:46

## 2020-08-19 RX ADMIN — INSULIN ASPART 2 UNITS: 100 INJECTION, SOLUTION INTRAVENOUS; SUBCUTANEOUS at 19:04

## 2020-08-19 RX ADMIN — PANTOPRAZOLE SODIUM 40 MG: 40 TABLET, DELAYED RELEASE ORAL at 08:38

## 2020-08-19 RX ADMIN — CLOTRIMAZOLE 10 MG: 10 LOZENGE ORAL at 15:13

## 2020-08-19 RX ADMIN — MYCOPHENOLATE MOFETIL 1000 MG: 500 TABLET ORAL at 08:46

## 2020-08-19 RX ADMIN — HYDRALAZINE HYDROCHLORIDE 25 MG: 25 TABLET ORAL at 19:46

## 2020-08-19 RX ADMIN — CLOTRIMAZOLE 10 MG: 10 LOZENGE ORAL at 19:46

## 2020-08-19 RX ADMIN — CEFTRIAXONE SODIUM 2 G: 2 INJECTION, POWDER, FOR SOLUTION INTRAMUSCULAR; INTRAVENOUS at 15:13

## 2020-08-19 RX ADMIN — PREDNISONE 5 MG: 5 TABLET ORAL at 08:38

## 2020-08-19 RX ADMIN — Medication 1 TABLET: at 08:39

## 2020-08-19 RX ADMIN — CLOTRIMAZOLE 10 MG: 10 LOZENGE ORAL at 12:30

## 2020-08-19 RX ADMIN — INSULIN GLARGINE 28 UNITS: 100 INJECTION, SOLUTION SUBCUTANEOUS at 22:04

## 2020-08-19 RX ADMIN — MAGNESIUM OXIDE 400 MG: 400 TABLET ORAL at 19:46

## 2020-08-19 RX ADMIN — MAGNESIUM OXIDE 400 MG: 400 TABLET ORAL at 08:38

## 2020-08-19 RX ADMIN — TACROLIMUS 3.5 MG: 1 CAPSULE ORAL at 08:39

## 2020-08-19 RX ADMIN — ROSUVASTATIN CALCIUM 10 MG: 10 TABLET, FILM COATED ORAL at 19:46

## 2020-08-19 RX ADMIN — HYDRALAZINE HYDROCHLORIDE 25 MG: 25 TABLET ORAL at 08:39

## 2020-08-19 RX ADMIN — ASPIRIN 81 MG CHEWABLE TABLET 81 MG: 81 TABLET CHEWABLE at 08:38

## 2020-08-19 RX ADMIN — AMLODIPINE BESYLATE 10 MG: 10 TABLET ORAL at 22:04

## 2020-08-19 RX ADMIN — VALGANCICLOVIR 450 MG: 450 TABLET, FILM COATED ORAL at 08:38

## 2020-08-19 RX ADMIN — Medication 1 TABLET: at 18:24

## 2020-08-19 RX ADMIN — AZITHROMYCIN MONOHYDRATE 250 MG: 250 TABLET ORAL at 15:12

## 2020-08-19 RX ADMIN — CLOTRIMAZOLE 10 MG: 10 LOZENGE ORAL at 08:38

## 2020-08-19 ASSESSMENT — ACTIVITIES OF DAILY LIVING (ADL)
ADLS_ACUITY_SCORE: 13

## 2020-08-19 ASSESSMENT — MIFFLIN-ST. JEOR: SCORE: 1547.94

## 2020-08-19 NOTE — PLAN OF CARE
Neuro: A&Ox4.   Cardiac: -115s,  VSS.   Respiratory: Sating * on RA. Pt afebrile.   GI/: Adequate urine output.  Diet/appetite: Tolerating diet. Eating well. Added carb coverage today.   Activity:  Assist of 1, up to edge of bed independently.  Pain: At acceptable level on current regimen- Pt denies pain.   Skin: No new deficits noted.   LDA's: PIV.     Plan: Pleasant patient. No issues this shift. Continue with POC. Notify primary team with changes.

## 2020-08-19 NOTE — PROGRESS NOTES
Admission          8/18/2020 9:15 PM  -----------------------------------------------------------  Reason for admission: CAP, COVID rule out  Primary team notified of pt arrival.  Admitted from: ED  Via: stretcher  Accompanied by: self  Belongings: Placed in closet; valuables sent home with family  Admission Profile: complete  Teaching: orientation to unit and call light- call light within reach, call don't fall, use of console, meal times, when to call for the RN, and enforced importance of safety   Access: PIV  Telemetry:Placed on pt  Ht./Wt.: complete  Code Status verified on armband: awaiting Code status  2 RN Skin Assessment Completed By: Davis Burns RN & Monique Chen RN  Med Rec completed: yes  Bed surface reassessed with algorithm and charted: yes  New bed surface ordered: no    Pt status: stable    Temp:  [97.9  F (36.6  C)-98.6  F (37  C)] 98.6  F (37  C)  Pulse:  [111-119] 111  Heart Rate:  [109-127] 126  Resp:  [14-20] 20  BP: (104-125)/(71-91) 120/89  SpO2:  [95 %-99 %] 99 %

## 2020-08-19 NOTE — PROGRESS NOTES
"Mariusz is NOT A LOW SUSPICION PUI (needs further investigation).    Follow these instructions:    If COVID test is positive -> continue isolation precautions    If 1st COVID test is negative -> continue isolation precautions    -  Order a repeat COVID test to be done 72 hours after the 1st test  -  Place \"PUI Isolation\" nurse communication order  -  Consider ID consult    If 2nd COVID test performed after 72 hours is negative -> consider discontinuing COVID-specific isolation precautions if clinical course atypical for COVID and/or an alternative diagnosis emerges      "

## 2020-08-19 NOTE — PROGRESS NOTES
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     # CAP  Symptoms of new fever, cough, and dyspnea in the setting of retrocardiac opacity concerning for CAP. Hemodynamically stable. Not Hypoxic. COVID negative  Patient provided with ceftriaxone and azithromycin in the ED as well as 1L of NS for hypotension.  - Continue 5 day course of IV Ceftriaxone and PO Azithromycin  - F/u on blood cultures  - F/u CMV PCR  - F/u LDH, induced sputum with cytology for PCP   - BK virus PCR  - Fungitell   - respiratory viral panel   - Appreciate ID recs  - Re-check COVID-19 in 72 hours (given fever, cough, shortness of breath - pt meets criteria for NOT LOW suspicion per PUI protocol)    # Hematuria, enlarged prostate  Concern for UTI , prostatitis   - will order U/A, Ux post prostatic massage  - fungal urine culture   - adenovirus culture in urine     # Status post OHT on 5/18/20, history of ICM  # Chronic immunosuppression  - Rejection history: none.   - Recent immunosuppression changes: none  - Last biopsy: 6/30 NER  - Intolerance to medications: none  - Immunosuppression:   - MMF decreased to 1000mg PO BID   - tacrolimus 3.5mg bid goal level 10-12, pending tomorrow    - prednisone per taper 5 mg PO daily   - PPx:   - CAV: aspirin 81mg daily, crestor 10mg daily   - PCP: Bactrim stopped 5/27 due to hyperkalemia, pentamidine given 6/30, 7/28   - CMV: Valcyte 450mg daily (renally dosed), x6 months (through 11/2020)   - Thrush: Nystatin QID   - Osteoporosis: calcium/vitamin D    - GI: pantoprazole 40mg  daily    # Hypertension  - Continue PTA Amlodipine 10 mg PO daily  - Continue PTA Hydralazine 25 mg PO TID     # Hypomagnesium  - Magnesium oxide 400 mg PO BID     # DM Type II   # Symptomatic hypoglycemia  - Glargine 28u qpm  - Medium dose correction scale  - Hypoglycemia Protocol       Diet: Cardiac Diet  DVT Prophylaxis: Ambulate every shift  Perez Catheter: not present  Code Status: FULL  Fluids: No mIVF  Lines: PIV    Entered: Adama Moura MD 08/19/2020, 5:21 PM     Discussed with Dr Colorado     ______________________________________________________________________    Chief Complaint   Fever    History is obtained from the patient    History of Present Illness   Patient has history of CAD (s/p STEMI with ALYSSA to LAD 6/27/18), ICM (LVEF 20-25%) s/p HM3 LVAD (12/31/18), 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     Patient developed fever to 100.5 tympanic last night in the setting of new cough productive of sputum and exertional dyspnea. He also reports shortness of breath that is new from his baseline and pleuritic chest pain when he coughs.No leg swelling. No N/V/D. Contacted the cardiology service who instructed them to come to the emergency department for evaluation. No dysuria, urgency, or frequency.    In the ED, labs significant for undetectable troponin, NT-BNP 1673, WBC 3.3, HGB 9.1, , normal electrolytes, Cr 1.77, and UA with 28 WBC and >182 RBC. Blood/urine cultures pending. CXR with increased retrocardiac opacities, possibly representing infection vs atelectasis.      Review of Systems    The 10 point Review of Systems is negative other than noted in the HPI or here.     Past Medical History    I have reviewed this patient's medical history and updated it with pertinent information if needed.   Past Medical History:   Diagnosis Date     Acute kidney  injury (H)      CKD (chronic kidney disease)      Coronary artery disease      Diabetes mellitus (H)      HTN (hypertension)      Hyperlipidemia      ICD (implantable cardioverter-defibrillator), single chamber- NOT dependent 7/17/2018     Ischemic cardiomyopathy      LV (left ventricular) mural thrombus      Paroxysmal atrial flutter (H)      RVF (right ventricular failure) (H)      Systolic heart failure (H)      Ventricular tachycardia (H)      Past Surgical History   I have reviewed this patient's surgical history and updated it with pertinent information if needed.  Past Surgical History:   Procedure Laterality Date     COLONOSCOPY N/A 12/7/2018    Procedure: COLONOSCOPY;  Surgeon: Krish Mercado MD;  Location: [facility]     CV HEART BIOPSY N/A 5/24/2020    Procedure: Heart Biopsy;  Surgeon: Kit Bacon MD;  [facility]AC [facility]     CV HEART BIOPSY N/A 6/1/2020    Procedure: CV HEART BIOPSY;  Surgeon: Kit Bacon MD;  [facility]: [facility]     CV HEART BIOPSY N/A 6/8/2020    Procedure: CV HEART BIOPSY;  Surgeon: Kit Bacon MD;  [facility]     CV HEART BIOPSY N/A 6/15/2020    Procedure: CV HEART BIOPSY;  Surgeon: Kit Bacon MD;  [facility]: [facility]     CV HEART BIOPSY N/A 6/30/2020    Procedure: CV HEART BIOPSY;  Surgeon: Kit Bacon MD;  [facility]     CV HEART BIOPSY N/A 7/14/2020    Procedure: CV HEART BIOPSY;  Surgeon: Brian Long MD;  [facility]     CV HEART BIOPSY N/A 7/29/2020    Procedure: CV HEART BIOPSY;  Surgeon: Momo Hunt MD;  [facility]     CV RIGHT HEART CATH N/A 2/19/2019    Procedure: RHC;  Surgeon: Brian Long MD;  Location: [facility]     CV RIGHT HEART CATH N/A 7/5/2019    Procedure: CV RIGHT HEART CATH;  Surgeon:  Khris Mcclelland MD;  Location:  HEART CARDIAC CATH LAB     CV RIGHT HEART CATH N/A 5/24/2020    Procedure: Right Heart Cath;  Surgeon: Kit Bacon MD;  Location:  HEART CARDIAC CATH LAB     CV RIGHT HEART CATH N/A 6/1/2020    Procedure: CV RIGHT HEART CATH;  Surgeon: Kit Bacon MD;  Location:  HEART CARDIAC CATH LAB     CV RIGHT HEART CATH N/A 6/8/2020    Procedure: CV RIGHT HEART CATH;  Surgeon: Kit Bacon MD;  Location:  HEART CARDIAC CATH LAB     CV RIGHT HEART CATH N/A 6/15/2020    Procedure: CV RIGHT HEART CATH;  Surgeon: Kit Bacon MD;  Location:  HEART CARDIAC CATH LAB     CV RIGHT HEART CATH N/A 6/30/2020    Procedure: CV RIGHT HEART CATH;  Surgeon: Kit Bacon MD;  Location:  HEART CARDIAC CATH LAB     CV RIGHT HEART CATH N/A 7/14/2020    Procedure: CV RIGHT HEART CATH;  Surgeon: Brian Long MD;  Location:  HEART CARDIAC CATH LAB     CV RIGHT HEART CATH N/A 7/29/2020    Procedure: CV RIGHT HEART CATH;  Surgeon: Momo Hunt MD;  Location:  HEART CARDIAC CATH LAB     HC PRQ TRLUML CORONARY ANGIOPLASTY ADDL BRANCH      PCI and ALYSSA to ostial LAD on 6/27/18     IMPLANT AUTOMATIC IMPLANTABLE CARDIOVERTER DEFIBRILLATOR       INSERT VENTRICULAR ASSIST DEVICE LEFT (HEARTMATE II) N/A 12/31/2018    Procedure: Median Sternotomy, On Cardiopulmonary Bypass Pump, Insertion of Left Ventricular Assist Device (Heartmate III);  Surgeon: Kamaljit Baker MD;  [facility]     PICC DOUBLE LUMEN PLACEMENT Right 05/22/2020    5FR DL PICC inserted at right basilic vein, length 38cm.     TRANSPLANT HEART RECIPIENT AFTER HEART MATE N/A 5/18/2020    Procedure: REDO MEDIAN STERNOTOMY, LYSIS OF ADHESIONS, ON CARDIOPULMONARY BYPASS PUMP, HEART TRANSPLANT RECIPIENT, REMOVAL OF LEFT VENTRICULAR ASSIST DEVICE, REMOVAL OF AUTOMATED IMPLANTABLE CARDIOVERTER DEFIBRILLATOR;  Surgeon: Elder Willis,  MD;  Location: [facility] OR     Social History   I have reviewed this patient's social history and updated it with pertinent information if needed.  Social History     Tobacco Use     Smoking status: Never Smoker     Smokeless tobacco: Never Used   Substance Use Topics     Alcohol use: No     Frequency: Never     Drug use: No     Family History   I have reviewed this patient's family history and updated it with pertinent information if needed.   No significant family history.     Prior to Admission Medications   Prior to Admission Medications   Prescriptions Last Dose Informant Patient Reported? Taking?   ACCU-CHEK [place] PLUS test strip   No No   Sig: Check BG 3 times daily at meals and at bedtime.   BD SILVANA U/F 32G X 4 MM insulin pen needle   No No   Sig: Use 3-4 daily.   Continuous Blood Gluc  (DEXCOM G6 ) DON   No No   Si each daily   Continuous Blood Gluc Sensor (DEXCOM G6 SENSOR) MISC   No No   Si each every 10 days   Continuous Blood Gluc Transmit (DEXCOM G6 TRANSMITTER) MISC   No No   Si each every 3 months   acetaminophen (TYLENOL) 325 MG tablet prn Self No Yes   Sig: Take 2 tablets (650 mg) by mouth every 4 hours as needed for other (multimodal surgical pain management along with NSAIDS and opioid medication as indicated based on pain control and physical function.)   amLODIPine (NORVASC) 5 MG tablet 2020 at pm  No Yes   Sig: Take 2 tablets (10 mg) by mouth At Bedtime   aspirin (ASA) 81 MG chewable tablet 2020 at am Self No Yes   Sig: Take 1 tablet (81 mg) by mouth daily   calcium carbonate 600 mg-vitamin D 400 units (CALTRATE) 600-400 MG-UNIT per tablet 2020 at am Self No Yes   Sig: Take 1 tablet by mouth 2 times daily (with meals)   clotrimazole 10 MG sushant 2020 at am  No Yes   Sig: Take 1 Sushant (10 mg) by mouth 4 times daily   hydrALAZINE (APRESOLINE) 25 MG tablet 2020 at am  No Yes   Sig: Take 1 tablet (25 mg) by mouth 3 times daily   insulin aspart  (NOVOLOG PEN) 100 UNIT/ML pen prn Self No Yes   Sig: Correction Scale - HIGH INSULIN RESISTANCE DOSING     Do Not give Correction Insulin if Pre-Meal BG less than 140.   For Pre-Meal  - 164 give 1 unit.   For Pre-Meal  - 189 give 2 units.   For Pre-Meal  - 214 give 3 units.   For Pre-Meal  - 239 give 4 units.   For Pre-Meal  - 264 give 5 units.   For Pre-Meal  - 289 give 6 units.   For Pre-Meal  - 314 give 7 units.   For Pre-Meal  - 339 give 8 units.   For Pre-Meal  - 364 give 9 units.   For Pre-Meal BG greater than or equal to 365 give 10 units  To be given with prandial insulin, and based on pre-meal blood glucose.   Notify provider if glucose greater than or equal to 350 mg/dL after administration of correction dose. If given at mealtime, administer within 30 minutes of start of meal   insulin aspart (NOVOLOG PEN) 100 UNIT/ML pen prn at   Self No Yes   Sig: Inject 1-7 Units Subcutaneous At Bedtime HIGH INSULIN RESISTANCE DOSING    Do Not give Bedtime Correction Insulin if BG less than 200.   For  - 224 give 1 units.   For  - 249 give 2 units.   For  - 274 give 3 units.   For  - 299 give 4 units.   For  - 324 give 5 units.   For  - 349 give 6 units.   For BG greater than or equal to 350 give 7 units.   Notify provider if glucose greater than or equal to 350 mg/dL after administration of correction dose. If given at mealtime, administer within 30 minutes of start of meal   insulin aspart (NOVOLOG PEN) 100 UNIT/ML pen 2020 at am  Yes Yes   Si unit / 8  gms CHO with meals,plus correction insulin. Pt uses approx 40 units in 24 hrs.   insulin glargine (LANTUS PEN) 100 UNIT/ML pen 2020 at pm  Yes Yes   Sig: Inject 32 Units Subcutaneous every evening Inject 32 units subcutaneous daily.   magnesium oxide (MAG-OX) 400 (241.3 Mg) MG tablet 2020 at am  No Yes   Sig: Take 1 tablet (400 mg) by mouth 2 times daily    mycophenolate (GENERIC EQUIVALENT) 500 MG tablet 8/18/2020 at am  No Yes   Sig: TAKE THREE TABLETS BY MOUTH TWICE A DAY   pantoprazole (PROTONIX) 40 MG EC tablet 8/18/2020 at am  No Yes   Sig: TAKE ONE TABLET BY MOUTH EVERY MORNING BEFORE BREAKFAST   predniSONE (DELTASONE) 10 MG tablet 8/18/2020 at am  No Yes   Sig: Take 0.5 tablets (5 mg) by mouth daily   rosuvastatin (CRESTOR) 10 MG tablet 8/17/2020 at pm  No Yes   Sig: TAKE ONE TABLET BY MOUTH ONCE DAILY   tacrolimus (GENERIC EQUIVALENT) 0.5 MG capsule 8/18/2020 at am  No Yes   Sig: Take one 0.5mg capsule WITH three 1mg capsules to equal 3.5mg twice a day.   tacrolimus (GENERIC EQUIVALENT) 1 MG capsule 8/18/2020 at am  No Yes   Sig: Take 3 capsules with one 0.5mg capule (3.5 mg) by mouth twice a day.   valGANciclovir (VALCYTE) 450 MG tablet 8/18/2020 at am  No Yes   Sig: TAKE ONE TABLET (450MG) BY MOUTH DAILY      Facility-Administered Medications: None     Allergies   No Known Allergies    Physical Exam   Vital Signs: Temp: 98.6  F (37  C) Temp src: Oral BP: 113/78 Pulse: 105 RETIRE: Heart Rate: 111 Resp: 16 SpO2: 99 % O2 Device: None (Room air)    Weight: 179 lbs 0 oz    General:  male, laying in bed, conversant, in NAD  Eyes: Eyes are clear, pupils are reactive, no scleral icterus.  EOMI  Cardiovascular: Tachycardic, no murmur noted. No lower extremity edema.  Respiratory: Clear to auscultation bilaterally. No wheezes or rhonchi appreciated  GI: Soft, non-tender, normal bowel sounds  Skin: Warm and dry, no rashes.   Neurologic: Neck supple. Cranial nerves are grossly intact.  is symmetric. CN II-XII intact, No lateralizing symptoms or focal neurologic deficits    Data   Data reviewed today: I reviewed all medications, new labs and imaging results over the last 24 hours.     Recent Labs   Lab 08/19/20  0558 08/18/20  1231 08/13/20  0840   WBC 2.5* 3.3* 2.4*   HGB 8.7* 9.1* 8.9*   MCV 92 92 92    271 295    135 141   POTASSIUM 3.6 3.7  4.2   CHLORIDE 107 104 111*   CO2 25 24 25   BUN 27 34* 42*   CR 1.31* 1.77* 1.76*   ANIONGAP 6 7 5   ARLENE 8.4* 8.6 8.7   GLC 69* 123* 147*   ALBUMIN  --  2.9* 3.1*   PROTTOTAL  --  6.8 6.6*   BILITOTAL  --  0.7 0.4   ALKPHOS  --  57 55   ALT  --  16 20   AST  --  16 18   TROPI  --  <0.015  --      No results found for this or any previous visit (from the past 24 hour(s)).

## 2020-08-19 NOTE — PLAN OF CARE
Pt A&O X4, VSS, T-max 100.7 (received PRN Tylenol X1, down 98.4), HR sinus tach 110-120's, BP's 's/60-80's, O2 sats > 90% on RA. LS & BS FUENTES d/t this RN wearing PAPR, CMS intact. Pt slept entirety of overnight shift, had few requests/complaints, denied pain. PIV in L arm patent & SL. Pt gets up ad domingo, ambulates independently. Voiding urine adequately, passing gas, formed brown BM X1 overnight. Tolerating low fat/2G Na diet with no nausea/emesis. Checking BG's Q4hrs, received patient care order  Has call light within reach, able to make needs known, will continue to monitor per POC and update primary team with changes.

## 2020-08-19 NOTE — CONSULTS
"Attestation signed by Martha Holguin MD at 8/19/2020 3:48 PM   Attestation:  Mariusz Sharp was seen in the hospital on 08/19/2020, with Lulu Engel MD, advanced practice provider with the infectious disease team, pager [phone], as part of a shared visit. I personally reviewed and discussed with the advanced practice provider the history & exam, vital signs, medications, labs and imaging. Key management decisions made by me include: Mariusz Sharp \"Red\" is a 59-year-old gentleman with various heart issues over the years who had an LVAD in place from 12/2018 through heart transplantation 5/18/2020. He is 93 days out from his heart transplant. He is admitted with low-grade fever and subtle changes on chest x-ray. He does not look toxic. Pneumocystis prophylaxis with Bactrim was discontinued 5/27/2020 due to high potassium levels, and nebulized pentamidine was given in its place 6/1/2020, 6/30/2020, and 7/29/2020. Breakthrough pneumocystis infections can occur when nebulized pentamidine is used, particularly in the upper lung lobes, so this has the potential to be part of the source of his fever. He was sero-discordant for CMV with his heart donor, with the vector in the direction of acquisition of a primary CMV infection for him, despite renal dose adjusted, low dose, Valcyte prophylaxis. So, a CMV blood PCR should be ordered. He has an active urine sediment, in the setting of a known enlarged prostate as visualized on CT of the pelvis on 5/3/2019. He has known kidney stones, so he could have a UTI with possible infected stones, although 8/18/2020 urine culture so far is negative. His PSA is known to be elevated, so he could have a chronic prostatitis, and he should have post-prostate massage bacterial and yeast urine cultures sent. Martha Holguin MD. Pager [phone].       Expand All Collapse All        [facility] INFECTIOUS DISEASES CONSULTATION     Patient:  Mariusz Sharp   Date of birth: " 1960  Date of Visit:  08/19/2020  Date of Admission: 8/18/2020  Consult Requester:Margie Colorado MD          Assessment and Recommendations:   Recommendations:  1. Collect Sputum Cx and gram stain.   2. Continue Ceftriaxone 2g q24+azithromcyin 250 mg daily to cover for CAP.  3. Draw blood cultures if spikes additional fevers.   4. Order RVP, serum CMV quant PCR, serum BK virus PCR, PSA, adenovirus viral culture nonblood (from urine), Fungitell.   5. Collect a post prostate massage bacterial urine culture & yeast urine culture. Please collect today as patient is already on IV antibiotics which could decrease yield.   6. Recommend collecting an induced sputum for cytology to evaluate for PJP.      Assessment:  Mariusz Sharp is a 59 year old male with PMHx significant for h/o CKD, pAF, HTN, DMII, CAD (s/p STEMI w/ ALYSSA to LAD 6/27/18), LV thrombus, ICM s/p HM3 LVAD (12/31/18) and now OHT 5/2020 with basiliximab induction followed by MMF/pred maintenence who presents to the ED with fever, productive cough, and exertional dyspnea.     Febrile to 100.7 over last 24hrs. Tachycardic upto 126 with normal BPs. O2 currently 90-99% on RA. WBC 3.3 on admission, procal <0.05 and lactic acid 0.9. CXR with increased retrocardiac opacities which may represent atelectasis vs consolidation. BCx NGTD, UA with moderate LE and elevated WBC of 28, UCx pending. COVID-19 PCR negative.     Broad infectious work up including evaluating for bacterial pneumonia, viral pneumonia and PJP. With abnormal UA and h/o enlarged prostate, will evaluate for prostatitis. Also, will evaluate for CMV and BK virus in a post-transplant patient.    Previous ID Issues:  -Donor BCx grew S anginosus and Veillonella sp in 1/2 sets on 5/16/20 (suspected contaminant) treated with 7 days antibiotics.  -C acnes growth from broth only from LVAD on removal, s/p doxycycline x 14 days.    Other ID issues:  - QTc interval:  456 msec on 8/18/20  - Pneumocystis ppx:  was on bactrim but this was stopped d/t hyperkalemia, given pentamidine last 6/2020.   - Viral serostatus & prophylaxis: CMV D+/R-, EBV D-/R+, HSV1?/2?, VZV +, Toxo D-/R-   - Immunization status: Too early to be discussed.   - Gamma globulin status: ?  - Isolation status: Good hand hygiene     Thank you for the consult. Transplant ID will continue to follow with you.     Lulu Engel PA-C   Infectious Diseases  Pager: [phone]  08/19/2020         History of Present Illness:   Mariusz Sharp is a 59 year old male with PMHx significant for h/o CKD, pAF, HTN, DMII, CAD (s/p STEMI w/ ALYSSA to LAD 6/27/18), LV thrombus, ICM s/p HM3 LVAD (12/31/18) and now OHT 5/2020 with basiliximab induction followed by MMF/pred maintenence who presents to the ED with fever, productive cough, and exertional dyspnea.     Explains he developed fevers to 100.5 last night along with new cough and sputum production. Noticed exertional dyspnea/SOB different than his baseline as well. Denies n/v/d, no frequency, urgency or dysuria. No abdominal pain. Denies rhinorrhea or nasal congestion, no sore throat.     Lives in [place] with his Father. No known sick contacts. Does get out and about for errands but takes appropriate precautions. Denies smoking or ETOH use. No pets.         Review of Systems:     CONSTITUTIONAL:  No chills or sweats. Positive for fever.   EYES: negative for icterus.  ENT: negative for congestion, rhinorrhea or sore throat.  RESPIRATORY:  Positive for cough, sputum production, and RADER.  CARDIOVASCULAR: Positive for CP with cough.  GASTROINTESTINAL:  negative for nausea, vomiting, diarrhea and constipation.  GENITOURINARY:  negative for dysuria, frequency or urgency.  HEME:  No easy bruising.  INTEGUMENT:  negative for rash and pruritus.  NEURO:  Negative for headache or vision changes.         Past Medical History:     Past Medical History:   Diagnosis Date     Acute kidney injury (H)      CKD (chronic kidney disease)       Coronary artery disease      Diabetes mellitus (H)      HTN (hypertension)      Hyperlipidemia      Ischemic cardiomyopathy      LV (left ventricular) mural thrombus      Paroxysmal atrial flutter (H)      RVF (right ventricular failure) (H)      Systolic heart failure (H)      Ventricular tachycardia (H)             Past Surgical History:     Past Surgical History:   Procedure Laterality Date     COLONOSCOPY N/A 12/7/2018    Procedure: COLONOSCOPY;  Surgeon: Krish Mercado MD;  Location: [facility] OR     CV HEART BIOPSY N/A 5/24/2020    Procedure: Heart Biopsy;  Surgeon: Kit Bacon MD;  Location: [facility]     CV HEART BIOPSY N/A 6/1/2020    Procedure: CV HEART BIOPSY;  Surgeon: Kit Bacon MD;  Location: [facility]     CV HEART BIOPSY N/A 6/8/2020    Procedure: CV HEART BIOPSY;  Surgeon: Kit Bacon MD;  Location: [facility]     CV HEART BIOPSY N/A 6/15/2020    Procedure: CV HEART BIOPSY;  Surgeon: Kit Bacon MD;  Location: [facility]     CV HEART BIOPSY N/A 6/30/2020    Procedure: CV HEART BIOPSY;  Surgeon: Kit Bacon MD;  Location: [facility]     CV HEART BIOPSY N/A 7/14/2020    Procedure: CV HEART BIOPSY;  Surgeon: Brian Long MD;  Location: [facility]     CV HEART BIOPSY N/A 7/29/2020    Procedure: CV HEART BIOPSY;  Surgeon: Momo Hunt MD;  Location: [facility]     CV RIGHT HEART CATH N/A 2/19/2019    Procedure: RHC;  Surgeon: Brian Long MD;  [facility]     CV RIGHT HEART CATH N/A 7/5/2019    Procedure: CV RIGHT HEART CATH;  Surgeon: Khris Mcclelland MD;  [facility]     CV RIGHT HEART CATH N/A 5/24/2020    Procedure: Right Heart Cath;  Surgeon: Kit Bacon MD;  Location: [facility]     CV RIGHT HEART  CATH N/A 6/1/2020    Procedure: CV RIGHT HEART CATH;  Surgeon: Kit Bacon MD;  Location:  HEART CARDIAC CATH LAB     CV RIGHT HEART CATH N/A 6/8/2020    Procedure: CV RIGHT HEART CATH;  Surgeon: Kit Bacon MD;  Location:  HEART CARDIAC CATH LAB     CV RIGHT HEART CATH N/A 6/15/2020    Procedure: CV RIGHT HEART CATH;  Surgeon: Kit Bacon MD;  Location:  HEART CARDIAC CATH LAB     CV RIGHT HEART CATH N/A 6/30/2020    Procedure: CV RIGHT HEART CATH;  Surgeon: Kit Bacon MD;  Location:  HEART CARDIAC CATH LAB     CV RIGHT HEART CATH N/A 7/14/2020    Procedure: CV RIGHT HEART CATH;  Surgeon: Brian Long MD;  Location:  HEART CARDIAC CATH LAB     CV RIGHT HEART CATH N/A 7/29/2020    Procedure: CV RIGHT HEART CATH;  Surgeon: Momo Hunt MD;  Location:  HEART CARDIAC CATH LAB     HC PRQ TRLUML CORONARY ANGIOPLASTY ADDL BRANCH      PCI and ALYSSA to ostial LAD on 6/27/18     IMPLANT AUTOMATIC IMPLANTABLE CARDIOVERTER DEFIBRILLATOR       INSERT VENTRICULAR ASSIST DEVICE LEFT (HEARTMATE II) N/A 12/31/2018    Procedure: Median Sternotomy, On Cardiopulmonary Bypass Pump, Insertion of Left Ventricular Assist Device (Heartmate III);  Surgeon: Kamaljit Baker MD;  Location: [facility] OR     PICC DOUBLE LUMEN PLACEMENT Right 05/22/2020    5FR DL PICC inserted at right basilic vein, length 38cm.     TRANSPLANT HEART RECIPIENT AFTER HEART MATE N/A 5/18/2020    Procedure: REDO MEDIAN STERNOTOMY, LYSIS OF ADHESIONS, ON CARDIOPULMONARY BYPASS PUMP, HEART TRANSPLANT RECIPIENT, REMOVAL OF LEFT VENTRICULAR ASSIST DEVICE, REMOVAL OF AUTOMATED IMPLANTABLE CARDIOVERTER DEFIBRILLATOR;  Surgeon: Elder Willis MD;  Location:  OR            Family History:   Addendum--  No family history on file.         Social History:     Social History     Tobacco Use     Smoking status: Never Smoker     Smokeless tobacco: Never Used   Substance Use  Topics     Alcohol use: No     Frequency: Never     History   Sexual Activity     Sexual activity: Not on file            Current Medications:       amLODIPine  10 mg Oral At Bedtime     aspirin  81 mg Oral Daily     azithromycin  250 mg Oral Q24H     calcium carbonate 600 mg-vitamin D 400 units  1 tablet Oral BID w/meals     cefTRIAXone  2 g Intravenous Q24H     clotrimazole  10 mg Oral 4x Daily     hydrALAZINE  25 mg Oral TID     [START ON 8/20/2020] insulin aspart   Subcutaneous QAM AC     insulin aspart   Subcutaneous Daily with lunch     insulin aspart   Subcutaneous Daily with supper     insulin aspart  1-6 Units Subcutaneous Q4H     insulin glargine  28 Units Subcutaneous At Bedtime     magnesium oxide  400 mg Oral BID     mycophenolate  1,000 mg Oral BID     pantoprazole  40 mg Oral QAM AC     predniSONE  5 mg Oral Daily     rosuvastatin  10 mg Oral Daily     sodium chloride (PF)  3 mL Intracatheter Q8H     tacrolimus  3.5 mg Oral BID IS     valGANciclovir  450 mg Oral Daily            Allergies:   No Known Allergies         Physical Exam:   Vitals were reviewed  Temp:  [98.3  F (36.8  C)-100.7  F (38.2  C)] 98.3  F (36.8  C)  Pulse:  [110-119] 110  RETIRE: Heart Rate:  [109-126] 111  Resp:  [14-20] 16  BP: ()/(62-91) 99/71  SpO2:  [90 %-99 %] 98 %    Vitals:    08/18/20 1210 08/18/20 2121 08/19/20 0511   Weight: 79.8 kg (176 lb) 81.3 kg (179 lb 3.2 oz) 81.2 kg (179 lb)     Constitutional: Pleasant male seen sitting up in bed, in NAD. Alert and interactive.   HEENT: NCAT, anicteric sclerae, conjunctiva clear. Moist mucous membranes without lesions or thrush. No oropharyngeal erythema.  Respiratory: Non-labored breathing, good air exchange. Lungs are clear to auscultation bilaterally, without wheezing, crackles or rhonchi. Intermittent cough noted.  Cardiovascular: Regular rate and rhythm with no murmur, rub or gallop.  GI: Normoactive BS. Abdomen is soft, non-distended.  Skin: Warm and dry. No rashes  or lesions on exposed surfaces.  Musculoskeletal: Extremities grossly normal. No tenderness or edema present.   Neurologic: A &O x3, speech normal, answering questions appropriately. Moves all extremities spontaneously. Grossly non-focal.  Neuropsychiatric: Mentation and affect normal/appropriate.         Laboratory Data:     Microbiology:  Culture Micro   Date Value Ref Range Status   08/18/2020 Culture in progress  Preliminary   08/18/2020 No growth after 16 hours  Preliminary   08/18/2020 No growth after 16 hours  Preliminary   05/21/2020 No growth  Final   05/21/2020 No growth  Final   05/18/2020 (A)  Final    On day 5, isolated in broth only:  Cutibacterium (Propionibacterium) acnes     05/18/2020   Final    Susceptibility testing of Cutibacterium is not done from this source. Our antibiogram   indicates that Cutibacterium species are susceptible to penicillin and cefotaxime, and   most are susceptible to clindamycin.  Sugar Jimenez M.D., Medical Director     05/18/2020 Culture negative after 4 weeks  Final   05/18/2020 (A)  Final    On day 4, isolated in broth only:  Cutibacterium (Propionibacterium) acnes  Susceptibility testing of Cutibacterium is not done from this source. Our antibiogram   indicates that Cutibacterium species are susceptible to penicillin and cefotaxime, and   most are susceptible to clindamycin.  Sugar Jimenez M.D., Medical Director     05/12/2020 No growth  Final   05/12/2020 No growth  Final   03/27/2019   Final    <10,000 colonies/mL  urogenital simona  Susceptibility testing not routinely done     02/11/2019 No growth  Final   01/30/2019 No growth  Final   01/23/2019 No growth  Final       Inflammatory Markers    Recent Labs   Lab Test 07/23/18  0645   CRP 11.0*       Metabolic Studies       Recent Labs   Lab Test 08/19/20  0558 08/18/20  1231 08/13/20  0840 07/29/20  0902 07/14/20  1030 06/30/20  0848  06/15/20  0826  05/24/20  1137    135 141 138 137 138   < > 139   <  > 138   POTASSIUM 3.6 3.7 4.2 4.4 4.0 4.9   < > 4.8   < > 4.3   CHLORIDE 107 104 111* 106 107 110*   < > 110*   < >  --    CO2 25 24 25 25 21 21   < > 21   < >  --    ANIONGAP 6 7 5 7 9 8   < > 8   < >  --    BUN 27 34* 42* 61* 61* 76*   < > 98*   < >  --    CR 1.31* 1.77* 1.76* 2.26* 1.86* 1.75*   < > 1.89*   < >  --    GFRESTIMATED 60* 41* 42* 31* 39* 42*   < > 38*   < >  --    GLC 69* 123* 147* 151* 147* 167*   < > 120*   < > 86   A1C 7.4*  --  7.2*  --   --   --   --  7.8*  --   --    ARLENE 8.4* 8.6 8.7 8.8 8.6 9.0   < > 9.0   < >  --    PHOS  --   --  3.4 3.9 2.8 3.5  --  4.4   < >  --    MAG  --   --  1.6 1.6 1.4* 1.3*  --  1.7   < >  --    LACT  --  0.9  --   --   --   --   --   --   --  1.0   CKT  --   --   --   --   --   --   --  132  --   --     < > = values in this interval not displayed.       Hepatic Studies    Recent Labs   Lab Test 08/18/20  1231 08/13/20  0840 07/29/20  0902 07/14/20  1030 06/30/20  0848 06/22/20  0930  06/04/20  0900  05/11/20   BILITOTAL 0.7 0.4 0.6 0.6 0.4 0.5   < >  --    < > 0.6   ALKPHOS 57 55 42 38* 43 39*   < >  --    < > 55   ALBUMIN 2.9* 3.1* 3.4 3.5 3.3* 3.1*   < >  --    < > 4.1   AST 16 18 20 26 24 16   < >  --    < > 26   ALT 16 20 32 38 51 46   < >  --    < > 25   LDH  --   --   --   --   --   --   --  144  --  139    < > = values in this interval not displayed.       Pancreatitis testing    Recent Labs   Lab Test 06/15/20  0826 05/18/20  0857 05/18/20  0010 08/06/18  1025   AMYLASE  --  56 65  --    TRIG 73  --   --  246*       Hematology Studies      Recent Labs   Lab Test 08/19/20  0558 08/18/20  1231 08/13/20  0840 07/29/20  0901 07/14/20  1030 06/30/20  0848  05/18/20  0857 05/18/20  0010  07/08/19  0327 07/07/19  0349   WBC 2.5* 3.3* 2.4* 1.7* 4.1 5.7   < > 6.2 7.5   < > 5.9 6.5   ANEU  --  2.6 1.7  --   --   --   --  5.4 4.9  --  3.5 4.0   ALYM  --  0.3* 0.5*  --   --   --   --  0.7* 1.4  --  1.2 1.1   MARTHA  --  0.4 0.2  --   --   --   --  0.1 0.8  --  0.7 0.8    AEOS  --  0.0 0.0  --   --   --   --  0.0 0.3  --  0.4 0.4   HGB 8.7* 9.1* 8.9* 8.8* 8.4* 8.4*   < > 12.0* 11.1*   < > 13.0* 12.1*   HCT 29.3* 30.2* 29.6* 28.9* 27.2* 27.8*   < > 39.2* 36.7*   < > 41.4 38.9*    271 295 252 195 231   < > 251 243   < > 206 189    < > = values in this interval not displayed.       Arterial Blood Gas Testing    Recent Labs   Lab Test 05/24/20  1137 05/21/20  0756 05/21/20  0445 05/20/20  2340  05/19/20  0943 05/19/20  0812 05/19/20  0324 05/19/20  0009  05/18/20  1835   PH  --   --   --   --   --  7.40 7.40 7.39 7.35  --  7.43   PCO2  --   --   --   --   --  27* 33* 38 39  --  33*   PO2  --   --   --   --   --  131* 124* 132* 153*  --  323*   HCO3  --   --   --   --   --  17* 21 23 21  --  22   O2PER 24.0 2L 2L 2L   < > 40 40  40 40  40 40  40   < > 100    < > = values in this interval not displayed.        Urine Studies     Recent Labs   Lab Test 08/18/20  1404 05/18/20  0135 05/12/20  2250 12/04/19  1641 03/27/19  0910   URINEPH 5.5 5.0 5.0 5.0 5.0   NITRITE Negative Negative Negative Negative Negative   LEUKEST Moderate* Negative Negative Negative Negative   WBCU 28* 7* 3 15* 3       Vancomycin Levels     Recent Labs   Lab Test 05/27/20  0837 05/24/20  1726 05/22/20  1704 05/20/20  1145   VANCOMYCIN 26.6* 11.9 12.5 15.3     Recent Labs   Lab Test 05/27/20  0837 05/24/20  1726 05/22/20  1704   VANCOMYCIN 26.6* 11.9 12.5     Hepatitis B Testing   Recent Labs   Lab Test 05/15/20  0559   HBCAB Nonreactive   HEPBANG Nonreactive       Hepatitis C Testing     Hepatitis C Antibody   Date Value Ref Range Status   05/15/2020 Nonreactive NR^Nonreactive Final     Comment:     Assay performance characteristics have not been established for newborns,   infants, and children     12/04/2019 Nonreactive NR^Nonreactive Final     Comment:     Assay performance characteristics have not been established for newborns,   infants, and children              Imaging:     Results for orders placed  or performed during the hospital encounter of 08/18/20   XR Chest Port 1 View    Narrative    EXAM: XR CHEST PORT 1 VW  8/18/2020 1:20 PM     HISTORY:  fever, cough       COMPARISON:  Chest x-ray 6/15/2020, CT chest 7/29/2020    FINDINGS: Single view of the chest. Post surgical changes in the  chest. Sternotomy wires are intact. Stable catheter projecting over  left lung apex.    Trachea is midline. Stable cardiac mediastinal silhouette. Low lung  volumes. Retrocardiac opacities. No large pleural effusion. No  pneumothorax. Visualized upper abdomen is unremarkable.      Impression    IMPRESSION: Increased retrocardiac opacities which may represent  infection versus atelectasis.    I have personally reviewed the examination and initial interpretation  and I agree with the findings.    ALICIA CEJA MD

## 2020-08-19 NOTE — UTILIZATION REVIEW
Admission Status; Secondary Review Determination     Admission Date: 8/18/2020 12:15 PM       Under the authority of the Utilization Management Committee, the utilization review process indicated a secondary review on the above patient.  The review outcome is based on review of the medical records, discussions with staff, and applying clinical experience noted on the date of the review.        (x)      Inpatient Status Appropriate - This patient's medical care is consistent with medical management for inpatient care and reasonable inpatient medical practice.       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 Patient developed fever to 100.5  in the setting of new cough productive of sputum and exertional dyspnea. He also reported shortness of breath that is new from his baseline and pleuritic chest pain when he coughs. In the ED, labs significant for undetectable troponin, NT-BNP 1673, WBC 3.3, HGB 9.1, , normal electrolytes, Cr 1.77, and UA with 28 WBC and >182 RBC. Blood/urine cultures pending. CXR with increased retrocardiac opacities, possibly representing infection vs atelectasis. On IVF, IV abx, pending cultures. Given his complicated PMH ID consulted and recommended the following today. Collect Sputum Cx and gram stain and to Continue Ceftriaxone+azithromcyin. Patient is not yet safe for discharge. He continues to be tachycardic with soft BPs.       At the time of admission with the information available to the attending physician, more than 2 nights hospital complex care was anticipated. Also, there was a risk of adverse  outcome if patient was treated outpatient or observation. High intensity of services anticipated. Inpatient admission appropriate based on Medicare guidelines.       The information on this document is developed by the utilization review team in order for the business office to ensure compliance.  This only denotes the appropriateness of proper admission status and does not reflect the quality of care rendered.         The definitions of Inpatient Status and Observation Status used in making the determination above are those provided in the CMS Coverage Manual, Chapter 1 and Chapter 6, section 70.4.      Sincerely,      Leticia Nazario MD   Utilization Review/ Case Management  [facility].

## 2020-08-20 PROBLEM — B34.8 RHINOVIRUS INFECTION: Status: ACTIVE | Noted: 2020-08-20

## 2020-08-20 LAB
ALBUMIN UR-MCNC: 30 MG/DL
ANION GAP SERPL CALCULATED.3IONS-SCNC: 8 MMOL/L (ref 3–14)
APPEARANCE UR: ABNORMAL
BACTERIA SPEC CULT: NO GROWTH
BILIRUB UR QL STRIP: NEGATIVE
BUN SERPL-MCNC: 31 MG/DL (ref 7–30)
C PNEUM DNA SPEC QL NAA+PROBE: NOT DETECTED
CALCIUM SERPL-MCNC: 8.4 MG/DL (ref 8.5–10.1)
CHLORIDE SERPL-SCNC: 109 MMOL/L (ref 94–109)
CMV DNA SPEC NAA+PROBE-ACNC: NORMAL [IU]/ML
CMV DNA SPEC NAA+PROBE-LOG#: NORMAL {LOG_IU}/ML
CO2 SERPL-SCNC: 23 MMOL/L (ref 20–32)
COLOR UR AUTO: YELLOW
CREAT SERPL-MCNC: 1.37 MG/DL (ref 0.66–1.25)
ERYTHROCYTE [DISTWIDTH] IN BLOOD BY AUTOMATED COUNT: 16.6 % (ref 10–15)
FLUAV H1 2009 PAND RNA SPEC QL NAA+PROBE: NOT DETECTED
FLUAV H1 RNA SPEC QL NAA+PROBE: NOT DETECTED
FLUAV H3 RNA SPEC QL NAA+PROBE: NOT DETECTED
FLUAV RNA SPEC QL NAA+PROBE: NOT DETECTED
FLUBV RNA SPEC QL NAA+PROBE: NOT DETECTED
GFR SERPL CREATININE-BSD FRML MDRD: 57 ML/MIN/{1.73_M2}
GLUCOSE BLDC GLUCOMTR-MCNC: 152 MG/DL (ref 70–99)
GLUCOSE SERPL-MCNC: 114 MG/DL (ref 70–99)
GLUCOSE UR STRIP-MCNC: 150 MG/DL
GRAM STN SPEC: NORMAL
HADV DNA SPEC QL NAA+PROBE: NOT DETECTED
HCOV PNL SPEC NAA+PROBE: NOT DETECTED
HCT VFR BLD AUTO: 29.4 % (ref 40–53)
HGB BLD-MCNC: 9 G/DL (ref 13.3–17.7)
HGB UR QL STRIP: ABNORMAL
HMPV RNA SPEC QL NAA+PROBE: NOT DETECTED
HPIV1 RNA SPEC QL NAA+PROBE: NOT DETECTED
HPIV2 RNA SPEC QL NAA+PROBE: NOT DETECTED
HPIV3 RNA SPEC QL NAA+PROBE: NOT DETECTED
HPIV4 RNA SPEC QL NAA+PROBE: NOT DETECTED
KETONES UR STRIP-MCNC: NEGATIVE MG/DL
LEUKOCYTE ESTERASE UR QL STRIP: ABNORMAL
Lab: NORMAL
M PNEUMO DNA SPEC QL NAA+PROBE: NOT DETECTED
MCH RBC QN AUTO: 27.8 PG (ref 26.5–33)
MCHC RBC AUTO-ENTMCNC: 30.6 G/DL (ref 31.5–36.5)
MCV RBC AUTO: 91 FL (ref 78–100)
MICROBIOLOGIST REVIEW: ABNORMAL
MUCOUS THREADS #/AREA URNS LPF: PRESENT /LPF
NITRATE UR QL: NEGATIVE
PH UR STRIP: 5.5 PH (ref 5–7)
PLATELET # BLD AUTO: 262 10E9/L (ref 150–450)
POTASSIUM SERPL-SCNC: 3.7 MMOL/L (ref 3.4–5.3)
RBC # BLD AUTO: 3.24 10E12/L (ref 4.4–5.9)
RBC #/AREA URNS AUTO: 48 /HPF (ref 0–2)
RSV RNA SPEC QL NAA+PROBE: NOT DETECTED
RSV RNA SPEC QL NAA+PROBE: NOT DETECTED
RV+EV RNA SPEC QL NAA+PROBE: ABNORMAL
SODIUM SERPL-SCNC: 140 MMOL/L (ref 133–144)
SOURCE: ABNORMAL
SP GR UR STRIP: 1.02 (ref 1–1.03)
SPECIMEN SOURCE: NORMAL
SQUAMOUS #/AREA URNS AUTO: <1 /HPF (ref 0–1)
TACROLIMUS BLD-MCNC: 13.7 UG/L (ref 5–15)
TME LAST DOSE: NORMAL H
URATE CRY #/AREA URNS HPF: ABNORMAL /HPF
UROBILINOGEN UR STRIP-MCNC: NORMAL MG/DL (ref 0–2)
WBC # BLD AUTO: 2.4 10E9/L (ref 4–11)
WBC #/AREA URNS AUTO: 12 /HPF (ref 0–5)

## 2020-08-20 PROCEDURE — 88312 SPECIAL STAINS GROUP 1: CPT | Performed by: STUDENT IN AN ORGANIZED HEALTH CARE EDUCATION/TRAINING PROGRAM

## 2020-08-20 PROCEDURE — 00000146 ZZHCL STATISTIC GLUCOSE BY METER IP

## 2020-08-20 PROCEDURE — 80048 BASIC METABOLIC PNL TOTAL CA: CPT | Performed by: STUDENT IN AN ORGANIZED HEALTH CARE EDUCATION/TRAINING PROGRAM

## 2020-08-20 PROCEDURE — 87205 SMEAR GRAM STAIN: CPT | Performed by: STUDENT IN AN ORGANIZED HEALTH CARE EDUCATION/TRAINING PROGRAM

## 2020-08-20 PROCEDURE — 87102 FUNGUS ISOLATION CULTURE: CPT | Performed by: STUDENT IN AN ORGANIZED HEALTH CARE EDUCATION/TRAINING PROGRAM

## 2020-08-20 PROCEDURE — 25000131 ZZH RX MED GY IP 250 OP 636 PS 637: Performed by: STUDENT IN AN ORGANIZED HEALTH CARE EDUCATION/TRAINING PROGRAM

## 2020-08-20 PROCEDURE — 25000128 H RX IP 250 OP 636: Performed by: STUDENT IN AN ORGANIZED HEALTH CARE EDUCATION/TRAINING PROGRAM

## 2020-08-20 PROCEDURE — 86352 CELL FUNCTION ASSAY W/STIM: CPT | Performed by: STUDENT IN AN ORGANIZED HEALTH CARE EDUCATION/TRAINING PROGRAM

## 2020-08-20 PROCEDURE — 85027 COMPLETE CBC AUTOMATED: CPT | Performed by: STUDENT IN AN ORGANIZED HEALTH CARE EDUCATION/TRAINING PROGRAM

## 2020-08-20 PROCEDURE — 81001 URINALYSIS AUTO W/SCOPE: CPT | Performed by: STUDENT IN AN ORGANIZED HEALTH CARE EDUCATION/TRAINING PROGRAM

## 2020-08-20 PROCEDURE — 87086 URINE CULTURE/COLONY COUNT: CPT | Performed by: INTERNAL MEDICINE

## 2020-08-20 PROCEDURE — 36415 COLL VENOUS BLD VENIPUNCTURE: CPT | Performed by: STUDENT IN AN ORGANIZED HEALTH CARE EDUCATION/TRAINING PROGRAM

## 2020-08-20 PROCEDURE — 80197 ASSAY OF TACROLIMUS: CPT | Performed by: STUDENT IN AN ORGANIZED HEALTH CARE EDUCATION/TRAINING PROGRAM

## 2020-08-20 PROCEDURE — 87070 CULTURE OTHR SPECIMN AEROBIC: CPT | Performed by: STUDENT IN AN ORGANIZED HEALTH CARE EDUCATION/TRAINING PROGRAM

## 2020-08-20 PROCEDURE — 99233 SBSQ HOSP IP/OBS HIGH 50: CPT | Mod: GC | Performed by: INTERNAL MEDICINE

## 2020-08-20 PROCEDURE — 25000132 ZZH RX MED GY IP 250 OP 250 PS 637: Performed by: STUDENT IN AN ORGANIZED HEALTH CARE EDUCATION/TRAINING PROGRAM

## 2020-08-20 PROCEDURE — 88108 CYTOPATH CONCENTRATE TECH: CPT | Performed by: STUDENT IN AN ORGANIZED HEALTH CARE EDUCATION/TRAINING PROGRAM

## 2020-08-20 PROCEDURE — 12000004 ZZH R&B IMCU UMMC

## 2020-08-20 RX ORDER — TACROLIMUS 1 MG/1
3 CAPSULE ORAL
Status: DISCONTINUED | OUTPATIENT
Start: 2020-08-20 | End: 2020-08-21 | Stop reason: HOSPADM

## 2020-08-20 RX ADMIN — MAGNESIUM OXIDE 400 MG: 400 TABLET ORAL at 20:10

## 2020-08-20 RX ADMIN — HYDRALAZINE HYDROCHLORIDE 25 MG: 25 TABLET ORAL at 13:28

## 2020-08-20 RX ADMIN — Medication 1 TABLET: at 09:20

## 2020-08-20 RX ADMIN — CLOTRIMAZOLE 10 MG: 10 LOZENGE ORAL at 16:44

## 2020-08-20 RX ADMIN — MYCOPHENOLATE MOFETIL 1000 MG: 500 TABLET ORAL at 09:21

## 2020-08-20 RX ADMIN — AMLODIPINE BESYLATE 10 MG: 10 TABLET ORAL at 22:02

## 2020-08-20 RX ADMIN — HYDRALAZINE HYDROCHLORIDE 25 MG: 25 TABLET ORAL at 20:09

## 2020-08-20 RX ADMIN — INSULIN ASPART 3 UNITS: 100 INJECTION, SOLUTION INTRAVENOUS; SUBCUTANEOUS at 20:12

## 2020-08-20 RX ADMIN — INSULIN ASPART 2 UNITS: 100 INJECTION, SOLUTION INTRAVENOUS; SUBCUTANEOUS at 18:20

## 2020-08-20 RX ADMIN — CLOTRIMAZOLE 10 MG: 10 LOZENGE ORAL at 13:27

## 2020-08-20 RX ADMIN — HYDRALAZINE HYDROCHLORIDE 25 MG: 25 TABLET ORAL at 09:20

## 2020-08-20 RX ADMIN — TACROLIMUS 3.5 MG: 1 CAPSULE ORAL at 09:21

## 2020-08-20 RX ADMIN — ROSUVASTATIN CALCIUM 10 MG: 10 TABLET, FILM COATED ORAL at 20:09

## 2020-08-20 RX ADMIN — MAGNESIUM OXIDE 400 MG: 400 TABLET ORAL at 09:20

## 2020-08-20 RX ADMIN — TACROLIMUS 3 MG: 1 CAPSULE ORAL at 18:13

## 2020-08-20 RX ADMIN — ASPIRIN 81 MG CHEWABLE TABLET 81 MG: 81 TABLET CHEWABLE at 09:20

## 2020-08-20 RX ADMIN — INSULIN GLARGINE 28 UNITS: 100 INJECTION, SOLUTION SUBCUTANEOUS at 22:02

## 2020-08-20 RX ADMIN — CLOTRIMAZOLE 10 MG: 10 LOZENGE ORAL at 20:10

## 2020-08-20 RX ADMIN — AZITHROMYCIN MONOHYDRATE 250 MG: 250 TABLET ORAL at 16:44

## 2020-08-20 RX ADMIN — VALGANCICLOVIR 450 MG: 450 TABLET, FILM COATED ORAL at 09:20

## 2020-08-20 RX ADMIN — CEFTRIAXONE SODIUM 2 G: 2 INJECTION, POWDER, FOR SOLUTION INTRAMUSCULAR; INTRAVENOUS at 16:43

## 2020-08-20 RX ADMIN — PANTOPRAZOLE SODIUM 40 MG: 40 TABLET, DELAYED RELEASE ORAL at 09:20

## 2020-08-20 RX ADMIN — Medication 1 TABLET: at 18:13

## 2020-08-20 RX ADMIN — INSULIN ASPART 2 UNITS: 100 INJECTION, SOLUTION INTRAVENOUS; SUBCUTANEOUS at 00:42

## 2020-08-20 RX ADMIN — PREDNISONE 5 MG: 5 TABLET ORAL at 09:20

## 2020-08-20 RX ADMIN — INSULIN ASPART 2 UNITS: 100 INJECTION, SOLUTION INTRAVENOUS; SUBCUTANEOUS at 13:26

## 2020-08-20 RX ADMIN — INSULIN ASPART 1 UNITS: 100 INJECTION, SOLUTION INTRAVENOUS; SUBCUTANEOUS at 04:05

## 2020-08-20 RX ADMIN — MYCOPHENOLATE MOFETIL 1000 MG: 500 TABLET ORAL at 20:10

## 2020-08-20 RX ADMIN — CLOTRIMAZOLE 10 MG: 10 LOZENGE ORAL at 09:19

## 2020-08-20 ASSESSMENT — ACTIVITIES OF DAILY LIVING (ADL)
ADLS_ACUITY_SCORE: 13

## 2020-08-20 ASSESSMENT — MIFFLIN-ST. JEOR: SCORE: 1542

## 2020-08-20 NOTE — PROGRESS NOTES
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     24 h events:  - ID recs appreciated   - Decreased tacrolimus to 3mg po bid   - Prostate massage with UA/UC/adenovirus culture  - F/u on remaining ID workup  - Repeat COVID 8/21    # CAP  Symptoms of new fever, cough, and dyspnea in the setting of retrocardiac opacity concerning for CAP. Hemodynamically stable. Not Hypoxic. COVID negative  Patient provided with ceftriaxone and azithromycin in the ED as well as 1L of NS for hypotension. ID workup thus far remarkable for RSV positive for rhinovirus.   - Continue 5 day course of IV Ceftriaxone and PO Azithromycin  - F/u on blood cultures, CMV PCR, LDH, induced sputum with cytology for PCP, BK virus PCR, fungitell, re-check COVID-19 8/21 (given fever, cough, shortness of breath - pt meets criteria for NOT LOW suspicion per PUI protocol)  - Appreciate ID recs    # Hematuria, enlarged prostate  Concern for UTI , prostatitis, prostate massage with UA/UC (including yeast) performed 8/21. Prostate enlarged, non-nodular, no tenderness.   - F/u on UA/UC/adenovirus culture     # Status post OHT on 5/18/20, history of ICM  # Chronic immunosuppression  - Rejection history: none.   - Recent immunosuppression changes: none  - Last biopsy: 6/30 NER  - Intolerance to medications: none  - Immunosuppression:   - MMF decreased to 1000mg PO BID   - Tacrolimus level 13.5, decreased from 3.5mg bid to 3mg bid goal level 10-12   - Prednisone per taper 5 mg PO daily   - PPx:   - CAV: aspirin 81mg daily, crestor 10mg daily   - PCP: Bactrim stopped 5/27 due to hyperkalemia, pentamidine given 6/30, 7/28   - CMV: Valcyte 450mg daily (renally dosed), x6 months  "(through 11/2020)   - Thrush: Nystatin QID   - Osteoporosis: calcium/vitamin D    - GI: pantoprazole 40mg daily    # Hypertension  - Continue PTA Amlodipine 10 mg PO daily  - Continue PTA Hydralazine 25 mg PO TID     # Hypomagnesium  - Magnesium oxide 400 mg PO BID     # DM Type II   # Symptomatic hypoglycemia  - Glargine 28u qpm  - Medium dose correction scale  - Hypoglycemia Protocol    Diet: Cardiac Diet  DVT Prophylaxis: Ambulate every shift  Perez Catheter: not present  Code Status: FULL  Fluids: No mIVF  Lines: PIV    Entered: Samuel Geller MD 08/20/2020, 5:46 PM     Discussed with Dr Colorado     Interval History:  NAEON. Nursing notes reviewed. Patient feels dyspnea and cough are improved. No dizziness. No chest pain. No change in bowel or bladder habits. Making jokes. No other concerns at this time.     Physical Exam   Temp: 97.9  F (36.6  C) Temp src: Oral BP: 107/86 Pulse: 115   Resp: 18 SpO2: 100 % O2 Device: None (Room air)    Vitals:    08/18/20 2121 08/19/20 0511 08/20/20 0604   Weight: 81.3 kg (179 lb 3.2 oz) 81.2 kg (179 lb) 80.6 kg (177 lb 11.1 oz)     Vital Signs with Ranges  Temp:  [97.9  F (36.6  C)-99.1  F (37.3  C)] 97.9  F (36.6  C)  Pulse:  [109-128] 115  Resp:  [16-20] 18  BP: (104-137)/(71-96) 107/86  SpO2:  [96 %-100 %] 100 %  I/O last 3 completed shifts:  In: 600 [P.O.:600]  Out: 1050 [Urine:1050]    RETIRE: Heart Rate: 111, Blood pressure 107/86, pulse 115, temperature 97.9  F (36.6  C), temperature source Oral, resp. rate 18, height 1.626 m (5' 4\"), weight 80.6 kg (177 lb 11.1 oz), SpO2 100 %.  177 lbs 11.05 oz     GEN: Alert, oriented x 3, appears comfortable, NAD.  CV: Regular rate and rhythm, no murmur or JVD.  S1 + S2 noted, no S3 or S4.  LUNGS: Clear to auscultation bilaterally   ABD: Active bowel sounds, soft, non-tender/non-distended. No rebound/guarding/rigidity.  SD: Enlarged prostate, no nodules, non-tender   EXT: No edema or cyanosis.      Medications     - MEDICATION " INSTRUCTIONS -         amLODIPine  10 mg Oral At Bedtime     aspirin  81 mg Oral Daily     azithromycin  250 mg Oral Q24H     calcium carbonate 600 mg-vitamin D 400 units  1 tablet Oral BID w/meals     cefTRIAXone  2 g Intravenous Q24H     clotrimazole  10 mg Oral 4x Daily     hydrALAZINE  25 mg Oral TID     insulin aspart   Subcutaneous QAM AC     insulin aspart   Subcutaneous Daily with lunch     insulin aspart   Subcutaneous Daily with supper     insulin aspart  1-6 Units Subcutaneous Q4H     insulin glargine  28 Units Subcutaneous At Bedtime     magnesium oxide  400 mg Oral BID     mycophenolate  1,000 mg Oral BID     pantoprazole  40 mg Oral QAM AC     predniSONE  5 mg Oral Daily     rosuvastatin  10 mg Oral Daily     sodium chloride (PF)  3 mL Intracatheter Q8H     tacrolimus  3 mg Oral BID IS     valGANciclovir  450 mg Oral Daily       Data   Recent Labs   Lab 08/20/20  0539 08/20/20  0538 08/19/20  0558 08/18/20  1231   WBC 2.4*  --  2.5* 3.3*   HGB 9.0*  --  8.7* 9.1*   MCV 91  --  92 92     --  250 271   NA  --  140 139 135   POTASSIUM  --  3.7 3.6 3.7   CHLORIDE  --  109 107 104   CO2  --  23 25 24   BUN  --  31* 27 34*   CR  --  1.37* 1.31* 1.77*   ANIONGAP  --  8 6 7   ARLENE  --  8.4* 8.4* 8.6   GLC  --  114* 69* 123*   ALBUMIN  --   --   --  2.9*   PROTTOTAL  --   --   --  6.8   BILITOTAL  --   --   --  0.7   ALKPHOS  --   --   --  57   ALT  --   --   --  16   AST  --   --   --  16   TROPI  --   --   --  <0.015       No results found for this or any previous visit (from the past 24 hour(s)).

## 2020-08-20 NOTE — PLAN OF CARE
Shift: 0700 - 1530  VS: Temp: 98.4  F (36.9  C) Temp src: Oral BP: (!) 137/95 Pulse: 128   Resp: 20 SpO2: 98 % O2 Device: None (Room air)    Pain: Denies pain.   Neuro: A&Ox4. Calls appropriately.   Cardiac:   SR/ST. HTN.   Respiratory: Lung sounds clear/diminished on RA.   GI/Diet/Appetite:  Low sat/fat diet, good appetite. LBM 8/19. BG AC/HS using his own BF monitor, 125 & 190.   :  Voiding w/o difficulty.   LDA's: PIV to JL SARMIENTO.   Skin: Intact.   Activity: Up ad domingo.   Tests/Procedures: Positive for Rhinovirus per Resp panel. Covid r/o planned for 8/21.   Pertinent Labs/Lab Collection:      Plan: Continue w/POC.

## 2020-08-20 NOTE — PROGRESS NOTES
"[facility]  Transplant Infectious Disease Progress Note     Patient:  Mariusz Sharp, Date of birth 1960, Medical record number [id number]  Date of Visit:  08/20/2020         Assessment and Recommendations:   Recommendations:  - He should have a rectal exam with prostate massage, followed by urination into a sterile specimen cup for urine culture. This should be done when he has a full bladder, so that he can urinate on command within the minute following prostate massage.  - Await pending Fungitell, BK virus, sputum cytology.  - OK to continue azithromycin and ceftriaxone while we are waiting for the result of the 8/20/20/20 sputum culture.  - Await repeat Covid-19 nasopharyngeal swab that is planned for 8/21/2020. If it is negative, he can be changed from special isolation to droplet isolation.    Transplant Infectious Disease will continue to follow with you.      Assessment:  Mariusz Sharp \"Red\" is a 59 year old male with PMHx significant for h/o CKD, pAF, HTN, DMII, CAD (s/p STEMI w/ ALYSSA to LAD 6/27/18), LV thrombus, ICM s/p HM3 LVAD (12/31/18) and now OHT 5/2020 with basiliximab induction followed by MMF/pred maintenence who presents to the ED 8/18/2020 with fever, productive cough, and exertional dyspnea.   Infectious Disease issues include:  - Fever with subtle increased retrocardiac opacities on 8/18/2020 chest x-ray, which could represent atelectasis or infection. He is being empirically treated for community acquired pneumonia with ceftriaxone and azithromycin. He does feel better.  - Rhinovirus shedding. Could be responsible for some of his pulmonary symptoms. He feels improved from my pulmonary standpoint today. There is no direct antiviral agent for this virus. If it is severe, it can be treated with IV IG. That does not seem to be the case for him. This infection does require droplet isolation.  - Enlarged prostate by CT imaging in the past, combined with elevated " PSA, suspect that he may have chronic prostatitis. Urine sediment active. 8/19/2020 PSA is 10.8. First urine culture negative. He should have a rectal exam with prostate massage, followed by urination into a sterile specimen cup for urine culture. Unfortunately, the antibiotics that he has been given so far right make a culture now false-negative.   - Donor BCx grew S anginosus and Veillonella sp in 1/2 sets on 5/16/20 (suspected contaminant) treated with 7 days antibiotics.  - Cutibacterium acnes growth from broth only from LVAD on removal, s/p doxycycline x 14 days.  - QTc interval: 456 msec on 8/18/20  - PCP prophylaxis: Bactrim was discontinued 5/27/2020 due to high potassium levels, and nebulized pentamidine was given in its place 6/1/2020, 6/30/2020, and 7/29/2020. Breakthrough pneumocystis infections can occur when nebulized pentamidine is used.  - Viral serostatus & prophylaxis: CMV D+/R-, EBV D-/R+, HSV1?/2?, VZV +, Toxo D-/R-; Valcyte  - Immunization status: he will be due for seasonal influenza, once the vaccine is available  - Gamma globulin status: unknown  - Isolation status: Good hand hygiene. He is in special isolation until he has a second negative Covid-19 swab.     Martha Holguin MD. Pager [phone]         Interval History:   Since Red was last seen by ID on 8/19/2020, he is feeling better. He has less cough. He is breathing room air. Maximum temperature overnight was 99.1F. White blood cell count is running in his usual range, 2.4 today. Mycophenolate was decreased to 1000 mg twice daily. Blood pressure was a little bit elevated, with diastolic of 95. He is eating well. PSA has returned more elevated than the prior test, so a post-prostate massage urine culture is warranted. He will have a follow-up COVID test tomorrow.      Transplants:  5/18/2020 (Heart), Postoperative day:  94.  Coordinator Angelika Muller    Review of Systems:  CONSTITUTIONAL:  Maximum temperature overnight was  99.1F.  EYES: no acute changes to vision.  ENT:  No acute changes to hearing.  RESPIRATORY:  Improvement in cough. His breathing room air.  CARDIOVASCULAR:  No chest pain.  GASTROINTESTINAL:  One bowel movement. Good appetite.  GENITOURINARY:  Good urine output. He had less urination overnight, usually he goes four times per night, last night he only went two times overnight. He usually has a little bit of dribbling at the beginning of his stream.  HEME:  No bleeding.  INTEGUMENT:  No rash.  NEURO:  Alert and oriented. No pain.         Current Medications & Allergies:       amLODIPine  10 mg Oral At Bedtime     aspirin  81 mg Oral Daily     azithromycin  250 mg Oral Q24H     calcium carbonate 600 mg-vitamin D 400 units  1 tablet Oral BID w/meals     cefTRIAXone  2 g Intravenous Q24H     clotrimazole  10 mg Oral 4x Daily     hydrALAZINE  25 mg Oral TID     insulin aspart   Subcutaneous QAM AC     insulin aspart   Subcutaneous Daily with lunch     insulin aspart   Subcutaneous Daily with supper     insulin aspart  1-6 Units Subcutaneous Q4H     insulin glargine  28 Units Subcutaneous At Bedtime     magnesium oxide  400 mg Oral BID     mycophenolate  1,000 mg Oral BID     pantoprazole  40 mg Oral QAM AC     predniSONE  5 mg Oral Daily     rosuvastatin  10 mg Oral Daily     sodium chloride (PF)  3 mL Intracatheter Q8H     tacrolimus  3.5 mg Oral BID IS     valGANciclovir  450 mg Oral Daily       Infusions/Drips:    - MEDICATION INSTRUCTIONS -         No Known Allergies         Physical Exam:   Vitals were reviewed.  All vitals stable.  Patient Vitals for the past 24 hrs:   BP Temp Temp src Pulse Resp SpO2 Weight   08/20/20 1128 (!) 137/95 98.4  F (36.9  C) Oral 128 20 98 % --   08/20/20 0828 (!) 124/96 98.4  F (36.9  C) Oral 110 20 99 % --   08/20/20 0604 -- -- -- -- -- -- 80.6 kg (177 lb 11.1 oz)   08/20/20 0400 108/71 99.1  F (37.3  C) Oral 112 20 96 % --   08/20/20 0040 (!) 127/90 98.4  F (36.9  C) Oral 109 16 98 %  --   08/19/20 2204 107/72 -- -- -- -- -- --   08/19/20 1946 (!) 110/90 -- -- -- -- -- --   08/19/20 1939 104/82 98.3  F (36.8  C) Oral 109 16 100 % --   08/19/20 1510 113/78 98.6  F (37  C) Oral 105 16 99 % --     Ranges for vital signs:  Temp:  [98.3  F (36.8  C)-99.1  F (37.3  C)] 98.4  F (36.9  C)  Pulse:  [105-128] 128  Resp:  [16-20] 20  BP: (104-137)/(71-96) 137/95  SpO2:  [96 %-100 %] 98 %  Vitals:    08/18/20 2121 08/19/20 0511 08/20/20 0604   Weight: 81.3 kg (179 lb 3.2 oz) 81.2 kg (179 lb) 80.6 kg (177 lb 11.1 oz)       Physical Examination:  GENERAL:  well-developed, well-nourished man, in bed in no acute distress.  HEAD:  Head is normocephalic, atraumatic   EYES:  Eyes have anicteric sclerae  ENT:  Oropharynx is moist without exudates or ulcers. Tongue is midline  NECK:  Supple.   LUNGS:  Clear to auscultation bilateral.   CARDIOVASCULAR:  Tachycardic rate and rhythm with no murmur  ABDOMEN:  Normal bowel sounds, soft, nontender.   SKIN:  No acute rashes. PIV in place left forearm without any surrounding erythema or exudate.  NEUROLOGIC:  Grossly nonfocal. Active x4 extremities         Laboratory Data:     Inflammatory Markers    Recent Labs   Lab Test 07/23/18  0645   CRP 11.0*       Metabolic Studies       Recent Labs   Lab Test 08/20/20  0538 08/19/20  0558 08/18/20  1231 08/13/20  0840  06/15/20  0826  05/24/20  1137    139 135 141   < > 139   < > 138   POTASSIUM 3.7 3.6 3.7 4.2   < > 4.8   < > 4.3   CHLORIDE 109 107 104 111*   < > 110*   < >  --    CO2 23 25 24 25   < > 21   < >  --    ANIONGAP 8 6 7 5   < > 8   < >  --    BUN 31* 27 34* 42*   < > 98*   < >  --    CR 1.37* 1.31* 1.77* 1.76*   < > 1.89*   < >  --    GFRESTIMATED 57* 60* 41* 42*   < > 38*   < >  --    * 69* 123* 147*   < > 120*   < > 86   A1C  --  7.4*  --  7.2*  --  7.8*  --   --    ARLENE 8.4* 8.4* 8.6 8.7   < > 9.0   < >  --    PHOS  --   --   --  3.4   < > 4.4   < >  --    MAG  --   --   --  1.6   < > 1.7   < >  --     LACT  --   --  0.9  --   --   --   --  1.0   PCAL  --   --  <0.05  --   --   --   --   --    CKT  --   --   --   --   --  132  --   --     < > = values in this interval not displayed.       Hepatic Studies    Recent Labs   Lab Test 08/19/20  1759 08/18/20  1231 08/13/20  0840 07/29/20  0902  06/04/20  0900   BILITOTAL  --  0.7 0.4 0.6   < >  --    DBIL  --   --   --  0.2  --   --    ALKPHOS  --  57 55 42   < >  --    PROTTOTAL  --  6.8 6.6* 6.3*   < >  --    ALBUMIN  --  2.9* 3.1* 3.4   < >  --    AST  --  16 18 20   < >  --    ALT  --  16 20 32   < >  --      --   --   --   --  144    < > = values in this interval not displayed.       Pancreatitis testing    Recent Labs   Lab Test 06/15/20  0826 05/18/20  0857 05/18/20  0010   AMYLASE  --  56 65   TRIG 73  --   --        Gout Labs      Recent Labs   Lab Test 07/06/19  0355 06/26/19  0925 01/23/19  1027 07/23/18  0645 07/17/18  0410   URIC 5.0 5.0 4.4 4.4 7.9*       Hematology Studies      Recent Labs   Lab Test 08/20/20  0539 08/19/20  0558 08/18/20  1231 08/13/20  0840 07/29/20  0901 07/14/20  1030   WBC 2.4* 2.5* 3.3* 2.4* 1.7* 4.1   ANEU  --   --  2.6 1.7  --   --    ALYM  --   --  0.3* 0.5*  --   --    MARTHA  --   --  0.4 0.2  --   --    AEOS  --   --  0.0 0.0  --   --    HGB 9.0* 8.7* 9.1* 8.9* 8.8* 8.4*   HCT 29.4* 29.3* 30.2* 29.6* 28.9* 27.2*    250 271 295 252 195       Clotting Studies    Recent Labs   Lab Test 06/04/20  0900 06/01/20  0856 05/18/20  1835 05/18/20  1630   INR 1.16* 1.16* 1.54* 2.21*   PTT  --   --  31 31       Iron Testing    Recent Labs   Lab Test 08/20/20  0539  06/26/19  0925  07/23/18  0645   IRON  --   --  58  --  35   FEB  --   --  370  --  272   IRONSAT  --   --  16  --  13*   JOSE  --   --  17*  --  646*   MCV 91   < > 88   < > 93   B12  --   --  638  --  1,143*    < > = values in this interval not displayed.       Arterial Blood Gas Testing    Recent Labs   Lab Test 05/24/20  1137 05/21/20  0756 05/21/20  0445  05/20/20  2340  05/19/20  0943 05/19/20  0812 05/19/20  0324 05/19/20  0009  05/18/20  1835   PH  --   --   --   --   --  7.40 7.40 7.39 7.35  --  7.43   PCO2  --   --   --   --   --  27* 33* 38 39  --  33*   PO2  --   --   --   --   --  131* 124* 132* 153*  --  323*   HCO3  --   --   --   --   --  17* 21 23 21  --  22   O2PER 24.0 2L 2L 2L   < > 40 40  40 40  40 40  40   < > 100    < > = values in this interval not displayed.        Thyroid Studies     Recent Labs   Lab Test 06/26/19  0925 07/23/18  0645   TSH 3.94 6.91*   T4  --  1.05       Urine Studies     Recent Labs   Lab Test 08/18/20  1404 05/18/20  0135 05/12/20  2250 12/04/19  1641 03/27/19  0910  01/30/19  1255   URINEPH 5.5 5.0 5.0 5.0 5.0   < > 5.0   NITRITE Negative Negative Negative Negative Negative   < > Negative   LEUKEST Moderate* Negative Negative Negative Negative   < > Negative   WBCU 28* 7* 3 15* 3   < > 82*   UWBCLM  --   --   --   --   --   --  Present*    < > = values in this interval not displayed.       Medication levels    Recent Labs   Lab Test 08/20/20  0538  05/27/20  0837   VANCOMYCIN  --   --  26.6*   TACROL 13.7   < >  --     < > = values in this interval not displayed.       Microbiology:  Last Culture results with specimen source  Culture Micro   Date Value Ref Range Status   08/20/2020 No growth after 3 hours  Preliminary   08/19/2020 Culture negative monitoring continues  Preliminary   08/18/2020   Final    <10,000 colonies/mL  urogenital simona  Susceptibility testing not routinely done     08/18/2020 No growth after 2 days  Preliminary   08/18/2020 No growth after 2 days  Preliminary   05/21/2020 No growth  Final   05/21/2020 No growth  Final   05/18/2020 (A)  Final    On day 5, isolated in broth only:  Cutibacterium (Propionibacterium) acnes     05/18/2020   Final    Susceptibility testing of Cutibacterium is not done from this source. Our antibiogram   indicates that Cutibacterium species are susceptible to penicillin and  cefotaxime, and   most are susceptible to clindamycin.  Sugar Jimenez M.D., Medical Director     05/18/2020 Culture negative after 4 weeks  Final   05/18/2020 (A)  Final    On day 4, isolated in broth only:  Cutibacterium (Propionibacterium) acnes  Susceptibility testing of Cutibacterium is not done from this source. Our antibiogram   indicates that Cutibacterium species are susceptible to penicillin and cefotaxime, and   most are susceptible to clindamycin.  Sugar Jimenez M.D., Medical Director     05/12/2020 No growth  Final   05/12/2020 No growth  Final   03/27/2019   Final    <10,000 colonies/mL  urogenital simona  Susceptibility testing not routinely done     02/11/2019 No growth  Final   01/30/2019 No growth  Final    Specimen Description   Date Value Ref Range Status   08/20/2020 Midstream Urine  Final   08/20/2020 Sputum  Final   08/19/2020 Midstream Urine  Final   08/18/2020 Midstream Urine  Final   08/18/2020 Blood Left Arm  Final   08/18/2020 Blood Left Arm  Final   05/21/2020 Blood Left Arm  Final   05/21/2020 Blood Right Arm  Final   05/18/2020   Final    Other OUTFLOW GRAFT This specimen was received on a swab. Results may not be optimal. For   maximum sensitivity of detection, submit tissue, fluid, or needle aspirate.     05/18/2020   Final    Other OUTFLOW GRAFT This specimen was received on a swab. Results may not be optimal. For   maximum sensitivity of detection, submit tissue, fluid, or needle aspirate.     05/18/2020 Other OUTFLOW GRAFT  Final   05/18/2020 Other OUTFLOW GRAFT  Final   05/12/2020 Blood  Final   05/12/2020 Blood  Final   03/27/2019 Unspecified Urine  Final   02/11/2019 Midstream Urine  Final          Virology:  Respiratory virus testing    Recent Labs   Lab Test 08/19/20  1900 08/18/20  1235 05/29/20  1530 05/18/20  0025   IFLUA Not Detected  --   --   --    FLUAH1 Not Detected  --   --   --    [id number] Not Detected  --   --   --    FLUAH3 Not Detected  --   --   --     IFLUB Not Detected  --   --   --    PIV1 Not Detected  --   --   --    PIV2 Not Detected  --   --   --    PIV3 Not Detected  --   --   --    PIV4 Not Detected  --   --   --    RSVA Not Detected  --   --   --    RSVB Not Detected  --   --   --    HMPV Not Detected  --   --   --    ADENOV Not Detected  --   --   --    CORONA Not Detected  --   --   --    [id number]  --  Nasopharyngeal Nasopharyngeal Nasopharyngeal   SARSCOVRES  --  NEGATIVE NEGATIVE NEGATIVE       Log IU/mL of CMVQNT   Date Value Ref Range Status   08/19/2020 Not Calculated <2.1 [Log_IU]/mL Final   08/13/2020 Not Calculated <2.1 [Log_IU]/mL Final   06/15/2020 Not Calculated <2.1 [Log_IU]/mL Final       EBV DNA Copies/mL   Date Value Ref Range Status   08/13/2020 EBV DNA Not Detected EBVNEG^EBV DNA Not Detected [Copies]/mL Final   06/15/2020 EBV DNA Not Detected EBVNEG^EBV DNA Not Detected [Copies]/mL Final       Imaging:  No results found for this or any previous visit (from the past 48 hour(s)).

## 2020-08-20 NOTE — PLAN OF CARE
"Neuro: A&Ox4.   Cardiac: -110s. /71   Pulse 112   Temp 99.1  F (37.3  C) (Oral)   Resp 20   Ht 1.626 m (5' 4\")   Wt 80.6 kg (177 lb 11.1 oz)   SpO2 96%   BMI 30.50 kg/m      Respiratory: Sating  on RA.  GI/: Adequate urine output.   Diet/appetite: Tolerating Low fat diet. Eating well.  Activity:  I/SBA up to chair and in halls.  Pain: At acceptable level on current regimen.   Skin: Incision from heart transplant  LDA's: PIVx1 tko    Plan: Continue with POC. Notify primary team with changes.   "

## 2020-08-20 NOTE — PLAN OF CARE
Neuro: A&Ox4. Pleasant, able to make needs known.  Cardiac: -110s. VSS on RA.   Respiratory: Sating >97% on RA.  GI/: Adequate urine output. BM X1  Diet/appetite: Tolerating low fat diet. Eating well.  Activity:  Assist of SBA, up to bathroom.  Pain: At acceptable level on current regimen.   Skin: No new deficits noted.  LDA's:   -PIV: TKO    Plan: UA and NP swab collected. Continue with POC. Notify primary team with changes.

## 2020-08-21 ENCOUNTER — PRE VISIT (OUTPATIENT)
Dept: TRANSPLANT | Facility: CLINIC | Age: 60
End: 2020-08-21

## 2020-08-21 VITALS
WEIGHT: 177.4 LBS | HEART RATE: 114 BPM | OXYGEN SATURATION: 100 % | TEMPERATURE: 98.1 F | SYSTOLIC BLOOD PRESSURE: 98 MMHG | BODY MASS INDEX: 30.29 KG/M2 | DIASTOLIC BLOOD PRESSURE: 83 MMHG | HEIGHT: 64 IN | RESPIRATION RATE: 16 BRPM

## 2020-08-21 DIAGNOSIS — Z94.1 HEART REPLACED BY TRANSPLANT (H): Primary | ICD-10-CM

## 2020-08-21 LAB
1,3 BETA GLUCAN SER-MCNC: 86 PG/ML
ALBUMIN SERPL-MCNC: 2.4 G/DL (ref 3.4–5)
ALP SERPL-CCNC: 59 U/L (ref 40–150)
ALT SERPL W P-5'-P-CCNC: 18 U/L (ref 0–70)
ANION GAP SERPL CALCULATED.3IONS-SCNC: 7 MMOL/L (ref 3–14)
AST SERPL W P-5'-P-CCNC: 20 U/L (ref 0–45)
B-D GLUCAN INTERPRETATION (1,3): POSITIVE
BACTERIA SPEC CULT: NO GROWTH
BILIRUB SERPL-MCNC: 0.3 MG/DL (ref 0.2–1.3)
BKV DNA # SPEC NAA+PROBE: NORMAL COPIES/ML
BKV DNA SPEC NAA+PROBE-LOG#: NORMAL LOG COPIES/ML
BUN SERPL-MCNC: 28 MG/DL (ref 7–30)
CALCIUM SERPL-MCNC: 8.5 MG/DL (ref 8.5–10.1)
CHLORIDE SERPL-SCNC: 109 MMOL/L (ref 94–109)
CO2 SERPL-SCNC: 23 MMOL/L (ref 20–32)
COPATH REPORT: NORMAL
CREAT SERPL-MCNC: 1.27 MG/DL (ref 0.66–1.25)
ERYTHROCYTE [DISTWIDTH] IN BLOOD BY AUTOMATED COUNT: 16.4 % (ref 10–15)
GFR SERPL CREATININE-BSD FRML MDRD: 62 ML/MIN/{1.73_M2}
GLUCOSE BLDC GLUCOMTR-MCNC: 109 MG/DL (ref 70–99)
GLUCOSE BLDC GLUCOMTR-MCNC: 126 MG/DL (ref 70–99)
GLUCOSE BLDC GLUCOMTR-MCNC: 167 MG/DL (ref 70–99)
GLUCOSE BLDC GLUCOMTR-MCNC: 187 MG/DL (ref 70–99)
GLUCOSE SERPL-MCNC: 126 MG/DL (ref 70–99)
HCT VFR BLD AUTO: 28.8 % (ref 40–53)
HGB BLD-MCNC: 8.8 G/DL (ref 13.3–17.7)
LABORATORY COMMENT REPORT: NORMAL
Lab: NORMAL
MCH RBC QN AUTO: 27.9 PG (ref 26.5–33)
MCHC RBC AUTO-ENTMCNC: 30.6 G/DL (ref 31.5–36.5)
MCV RBC AUTO: 91 FL (ref 78–100)
PLATELET # BLD AUTO: 263 10E9/L (ref 150–450)
POTASSIUM SERPL-SCNC: 4 MMOL/L (ref 3.4–5.3)
PROT SERPL-MCNC: 6.2 G/DL (ref 6.8–8.8)
RBC # BLD AUTO: 3.15 10E12/L (ref 4.4–5.9)
SARS-COV-2 RNA SPEC QL NAA+PROBE: NEGATIVE
SODIUM SERPL-SCNC: 139 MMOL/L (ref 133–144)
SPECIMEN SOURCE: NORMAL
TACROLIMUS BLD-MCNC: 12.7 UG/L (ref 5–15)
TME LAST DOSE: NORMAL H
URATE SERPL-MCNC: 5.2 MG/DL (ref 3.5–7.2)
WBC # BLD AUTO: 2.3 10E9/L (ref 4–11)

## 2020-08-21 PROCEDURE — 25000131 ZZH RX MED GY IP 250 OP 636 PS 637: Performed by: STUDENT IN AN ORGANIZED HEALTH CARE EDUCATION/TRAINING PROGRAM

## 2020-08-21 PROCEDURE — 36415 COLL VENOUS BLD VENIPUNCTURE: CPT | Performed by: STUDENT IN AN ORGANIZED HEALTH CARE EDUCATION/TRAINING PROGRAM

## 2020-08-21 PROCEDURE — 80053 COMPREHEN METABOLIC PANEL: CPT | Performed by: STUDENT IN AN ORGANIZED HEALTH CARE EDUCATION/TRAINING PROGRAM

## 2020-08-21 PROCEDURE — 25000132 ZZH RX MED GY IP 250 OP 250 PS 637: Performed by: STUDENT IN AN ORGANIZED HEALTH CARE EDUCATION/TRAINING PROGRAM

## 2020-08-21 PROCEDURE — 84550 ASSAY OF BLOOD/URIC ACID: CPT | Performed by: STUDENT IN AN ORGANIZED HEALTH CARE EDUCATION/TRAINING PROGRAM

## 2020-08-21 PROCEDURE — 00000146 ZZHCL STATISTIC GLUCOSE BY METER IP

## 2020-08-21 PROCEDURE — 85027 COMPLETE CBC AUTOMATED: CPT | Performed by: STUDENT IN AN ORGANIZED HEALTH CARE EDUCATION/TRAINING PROGRAM

## 2020-08-21 PROCEDURE — U0003 INFECTIOUS AGENT DETECTION BY NUCLEIC ACID (DNA OR RNA); SEVERE ACUTE RESPIRATORY SYNDROME CORONAVIRUS 2 (SARS-COV-2) (CORONAVIRUS DISEASE [COVID-19]), AMPLIFIED PROBE TECHNIQUE, MAKING USE OF HIGH THROUGHPUT TECHNOLOGIES AS DESCRIBED BY CMS-2020-01-R: HCPCS | Performed by: STUDENT IN AN ORGANIZED HEALTH CARE EDUCATION/TRAINING PROGRAM

## 2020-08-21 PROCEDURE — 80197 ASSAY OF TACROLIMUS: CPT | Performed by: STUDENT IN AN ORGANIZED HEALTH CARE EDUCATION/TRAINING PROGRAM

## 2020-08-21 PROCEDURE — 99238 HOSP IP/OBS DSCHRG MGMT 30/<: CPT | Mod: GC | Performed by: INTERNAL MEDICINE

## 2020-08-21 RX ORDER — TACROLIMUS 1 MG/1
3 CAPSULE ORAL 2 TIMES DAILY
Qty: 180 CAPSULE | Refills: 11 | Status: SHIPPED | OUTPATIENT
Start: 2020-08-21 | End: 2020-08-21

## 2020-08-21 RX ORDER — TACROLIMUS 1 MG/1
3 CAPSULE ORAL 2 TIMES DAILY
Qty: 180 CAPSULE | Refills: 11 | Status: ON HOLD | OUTPATIENT
Start: 2020-08-21 | End: 2020-09-24

## 2020-08-21 RX ORDER — CEFPODOXIME PROXETIL 100 MG/5ML
200 GRANULE, FOR SUSPENSION ORAL 2 TIMES DAILY
Status: DISCONTINUED | OUTPATIENT
Start: 2020-08-21 | End: 2020-08-21 | Stop reason: HOSPADM

## 2020-08-21 RX ORDER — CEFPODOXIME PROXETIL 100 MG/5ML
200 GRANULE, FOR SUSPENSION ORAL 2 TIMES DAILY
Qty: 840 ML | Refills: 0 | Status: SHIPPED | OUTPATIENT
Start: 2020-08-21 | End: 2020-08-24 | Stop reason: ALTCHOICE

## 2020-08-21 RX ORDER — LIDOCAINE 40 MG/G
CREAM TOPICAL
Status: CANCELLED | OUTPATIENT
Start: 2020-08-21

## 2020-08-21 RX ORDER — MYCOPHENOLATE MOFETIL 500 MG/1
1000 TABLET ORAL 2 TIMES DAILY
Qty: 180 TABLET | Refills: 11 | Status: SHIPPED | OUTPATIENT
Start: 2020-08-21 | End: 2020-08-21

## 2020-08-21 RX ORDER — MYCOPHENOLATE MOFETIL 500 MG/1
1000 TABLET ORAL 2 TIMES DAILY
Qty: 180 TABLET | Refills: 11 | Status: ON HOLD | OUTPATIENT
Start: 2020-08-21 | End: 2020-09-23

## 2020-08-21 RX ORDER — CEFPODOXIME PROXETIL 100 MG/5ML
200 GRANULE, FOR SUSPENSION ORAL 2 TIMES DAILY
Qty: 840 ML | Refills: 0 | Status: SHIPPED | OUTPATIENT
Start: 2020-08-21 | End: 2020-08-21

## 2020-08-21 RX ORDER — CEFDINIR 300 MG/1
300 CAPSULE ORAL 2 TIMES DAILY
Qty: 84 CAPSULE | Refills: 0 | Status: ON HOLD | OUTPATIENT
Start: 2020-08-21 | End: 2020-09-23

## 2020-08-21 RX ADMIN — VALGANCICLOVIR 450 MG: 450 TABLET, FILM COATED ORAL at 09:11

## 2020-08-21 RX ADMIN — CLOTRIMAZOLE 10 MG: 10 LOZENGE ORAL at 13:40

## 2020-08-21 RX ADMIN — ASPIRIN 81 MG CHEWABLE TABLET 81 MG: 81 TABLET CHEWABLE at 09:11

## 2020-08-21 RX ADMIN — HYDRALAZINE HYDROCHLORIDE 25 MG: 25 TABLET ORAL at 14:29

## 2020-08-21 RX ADMIN — MYCOPHENOLATE MOFETIL 1000 MG: 500 TABLET ORAL at 09:10

## 2020-08-21 RX ADMIN — INSULIN ASPART 1 UNITS: 100 INJECTION, SOLUTION INTRAVENOUS; SUBCUTANEOUS at 13:39

## 2020-08-21 RX ADMIN — CLOTRIMAZOLE 10 MG: 10 LOZENGE ORAL at 16:23

## 2020-08-21 RX ADMIN — CLOTRIMAZOLE 10 MG: 10 LOZENGE ORAL at 09:11

## 2020-08-21 RX ADMIN — MAGNESIUM OXIDE 400 MG: 400 TABLET ORAL at 09:11

## 2020-08-21 RX ADMIN — Medication 1 TABLET: at 09:10

## 2020-08-21 RX ADMIN — PANTOPRAZOLE SODIUM 40 MG: 40 TABLET, DELAYED RELEASE ORAL at 09:11

## 2020-08-21 RX ADMIN — HYDRALAZINE HYDROCHLORIDE 25 MG: 25 TABLET ORAL at 09:10

## 2020-08-21 RX ADMIN — PREDNISONE 5 MG: 5 TABLET ORAL at 09:10

## 2020-08-21 RX ADMIN — INSULIN ASPART 1 UNITS: 100 INJECTION, SOLUTION INTRAVENOUS; SUBCUTANEOUS at 01:30

## 2020-08-21 RX ADMIN — TACROLIMUS 3 MG: 1 CAPSULE ORAL at 09:11

## 2020-08-21 ASSESSMENT — ACTIVITIES OF DAILY LIVING (ADL)
ADLS_ACUITY_SCORE: 13

## 2020-08-21 ASSESSMENT — MIFFLIN-ST. JEOR: SCORE: 1540.68

## 2020-08-21 NOTE — PROGRESS NOTES
Transplant Infectious Disease Progress Note     Patient:  Mariusz Sharp   Date of birth 1960, Medical record number [id number]  Date of Visit:  08/21/2020  Date of Admission: 8/18/2020  Consult Requester:Margie Colorado MD          Assessment and Plan:   Recommendations:  1. Okay to discontinue IV Ceftriaxone and Azithromycin. Transition to PO Vantin 200 mg BID x 6 weeks to treat presumed prostatitis infection.   2. Patient to follow up with Dr. Holguin in 6 weeks for further mgmt (requested for you).   3. Recommend follow up with urology for rising PSA.   4. Draw uric acid level to better clarify clinical picture with uric acid crystals in urine.   5. Recommend dietitian counseling on diet/what foods to avoid with elevated uric acid.      Assessment:  Mariusz Sharp is a 59 year old male with PMHx significant for h/o CKD, pAF, HTN, DMII, CAD (s/p STEMI w/ ALYSSA to LAD 6/27/18), LV thrombus, ICM s/p HM3 LVAD (12/31/18) and now OHT 5/2020 with basiliximab induction followed by MMF/pred maintenence who presents to the ED with fever, productive cough, and exertional dyspnea.      Febrile to 100.7 over last 24hrs. Tachycardic upto 126 with normal BPs. O2 currently 90-99% on RA. WBC 3.3 on admission, procal <0.05 and lactic acid 0.9. CXR with increased retrocardiac opacities which may represent atelectasis vs consolidation. BCx NGTD, UA with moderate LE and elevated WBC of 28, UCx pending. COVID-19 PCR negative.      Broad infectious work up including evaluating for bacterial pneumonia, viral pneumonia and PJP. With abnormal UA and h/o enlarged prostate, will evaluate for prostatitis. Also, will evaluate for CMV and BK virus in a post-transplant patient.    Suspect URI sx 2/2 rhinovirus. Prostatitis could be contributing to temps, improved on abx. Cytology to r/o PJP still pending.      Previous ID Issues:  -Donor BCx grew S anginosus and Veillonella sp in 1/2 sets on 5/16/20 (suspected contaminant) treated with 7  days antibiotics.  -C acnes growth from broth only from LVAD on removal, s/p doxycycline x 14 days.     Other ID issues:  - QTc interval:  456 msec on 8/18/20  - Pneumocystis ppx: was on bactrim but this was stopped d/t hyperkalemia, given pentamidine last 6/2020.   - Viral serostatus & prophylaxis: CMV D+/R-, EBV D-/R+, HSV1?/2?, VZV +, Toxo D-/R-   - Immunization status: Too early to be discussed.   - Gamma globulin status: ?  - Isolation status: Good hand hygiene      Thank you for the consult. Transplant ID will continue to follow with you.      Lulu Engel PA-C   Infectious Diseases  Pager: [phone]  08/19/2020         Interim History and Events:   Feeling better today. Does have a bit of a lingering cough, otherwise no new complaints. Denies frequency/urgency or dysuria when voiding. No change in loose stools.          HPI:   Mariusz Sharp is a 59 year old male with PMHx significant for h/o CKD, pAF, HTN, DMII, CAD (s/p STEMI w/ ALYSSA to LAD 6/27/18), LV thrombus, ICM s/p HM3 LVAD (12/31/18) and now OHT 5/2020 with basiliximab induction followed by MMF/pred maintenence who presents to the ED with fever, productive cough, and exertional dyspnea.      Explains he developed fevers to 100.5 last night along with new cough and sputum production. Noticed exertional dyspnea/SOB different than his baseline as well. Denies n/v/d, no frequency, urgency or dysuria. No abdominal pain. Denies rhinorrhea or nasal congestion, no sore throat.      Lives in [place] with his Father. No known sick contacts. Does get out and about for errands but takes appropriate precautions. Denies smoking or ETOH use. No pets.          Review of Systems:        Physical Examination:  Temp: 98.3  F (36.8  C) Temp src: Oral BP: (!) 126/93 Pulse: 114   Resp: 18 SpO2: 97 % O2 Device: None (Room air)      Vitals:    08/18/20 1210 08/18/20 2121 08/19/20 0511 08/20/20 0604   Weight: 79.8 kg (176 lb) 81.3 kg (179 lb 3.2 oz) 81.2 kg (179 lb) 80.6 kg  (177 lb 11.1 oz)    08/21/20 0436   Weight: 80.5 kg (177 lb 6.4 oz)       Constitutional: Pleasant male seen sitting up in bed, in NAD. Alert and interactive.   HEENT: NCAT, anicteric sclerae, conjunctiva clear. Moist mucous membranes.  Respiratory: Non-labored breathing, good air exchange. Lungs are clear to auscultation bilaterally, without wheezing, crackles or rhonchi.   Cardiovascular: Regular rate and rhythm with no murmur, rub or gallop.  GI: Normoactive BS. Abdomen is soft, non-distended, and non-tender to palpation.  Skin: Warm and dry. No rashes or lesions on exposed surfaces.  Musculoskeletal: Extremities grossly normal. No tenderness.  Neurologic: A &O x3, speech normal, answering questions appropriately. Moves all extremities spontaneously. Grossly non-focal.  Neuropsychiatric: Mentation and affect normal/appropriate.    Medications:    amLODIPine  10 mg Oral At Bedtime     aspirin  81 mg Oral Daily     azithromycin  250 mg Oral Q24H     calcium carbonate 600 mg-vitamin D 400 units  1 tablet Oral BID w/meals     cefTRIAXone  2 g Intravenous Q24H     clotrimazole  10 mg Oral 4x Daily     hydrALAZINE  25 mg Oral TID     insulin aspart   Subcutaneous QAM AC     insulin aspart   Subcutaneous Daily with lunch     insulin aspart   Subcutaneous Daily with supper     insulin aspart  1-6 Units Subcutaneous Q4H     insulin glargine  28 Units Subcutaneous At Bedtime     magnesium oxide  400 mg Oral BID     mycophenolate  1,000 mg Oral BID     pantoprazole  40 mg Oral QAM AC     predniSONE  5 mg Oral Daily     rosuvastatin  10 mg Oral Daily     sodium chloride (PF)  3 mL Intracatheter Q8H     tacrolimus  3 mg Oral BID IS     valGANciclovir  450 mg Oral Daily     Laboratory Data:     Inflammatory Markers    Recent Labs   Lab Test 07/23/18  0645   CRP 11.0*       Metabolic Studies       Recent Labs   Lab Test 08/21/20  0456 08/20/20  0538 08/19/20  0558 08/18/20  1231 08/13/20  0840 07/29/20  0902  06/15/20  0826   05/24/20  1137    140 139 135 141 138   < > 139   < > 138   POTASSIUM 4.0 3.7 3.6 3.7 4.2 4.4   < > 4.8   < > 4.3   CHLORIDE 109 109 107 104 111* 106   < > 110*   < >  --    CO2 23 23 25 24 25 25   < > 21   < >  --    ANIONGAP 7 8 6 7 5 7   < > 8   < >  --    BUN 28 31* 27 34* 42* 61*   < > 98*   < >  --    CR 1.27* 1.37* 1.31* 1.77* 1.76* 2.26*   < > 1.89*   < >  --    GFRESTIMATED 62 57* 60* 41* 42* 31*   < > 38*   < >  --    * 114* 69* 123* 147* 151*   < > 120*   < > 86   A1C  --   --  7.4*  --  7.2*  --   --  7.8*  --   --    ARLENE 8.5 8.4* 8.4* 8.6 8.7 8.8   < > 9.0   < >  --    PHOS  --   --   --   --  3.4 3.9   < > 4.4   < >  --    MAG  --   --   --   --  1.6 1.6   < > 1.7   < >  --    LACT  --   --   --  0.9  --   --   --   --   --  1.0   CKT  --   --   --   --   --   --   --  132  --   --     < > = values in this interval not displayed.       Hepatic Studies    Recent Labs   Lab Test 08/21/20  0456 08/19/20  1759 08/18/20  1231 08/13/20  0840 07/29/20  0902 07/14/20  1030 06/30/20  0848  06/04/20  0900   BILITOTAL 0.3  --  0.7 0.4 0.6 0.6 0.4   < >  --    ALKPHOS 59  --  57 55 42 38* 43   < >  --    ALBUMIN 2.4*  --  2.9* 3.1* 3.4 3.5 3.3*   < >  --    AST 20  --  16 18 20 26 24   < >  --    ALT 18  --  16 20 32 38 51   < >  --    LDH  --  215  --   --   --   --   --   --  144    < > = values in this interval not displayed.       Pancreatitis testing    Recent Labs   Lab Test 06/15/20  0826 05/18/20  0857 05/18/20  0010 08/06/18  1025   AMYLASE  --  56 65  --    TRIG 73  --   --  246*       Hematology Studies      Recent Labs   Lab Test 08/21/20  0456 08/20/20  0539 08/19/20  0558 08/18/20  1231 08/13/20  0840 07/29/20  0901  05/18/20  0857 05/18/20  0010  07/08/19  0327 07/07/19  0349   WBC 2.3* 2.4* 2.5* 3.3* 2.4* 1.7*   < > 6.2 7.5   < > 5.9 6.5   ANEU  --   --   --  2.6 1.7  --   --  5.4 4.9  --  3.5 4.0   ALYM  --   --   --  0.3* 0.5*  --   --  0.7* 1.4  --  1.2 1.1   MARTHA  --   --   --  0.4  0.2  --   --  0.1 0.8  --  0.7 0.8   AEOS  --   --   --  0.0 0.0  --   --  0.0 0.3  --  0.4 0.4   HGB 8.8* 9.0* 8.7* 9.1* 8.9* 8.8*   < > 12.0* 11.1*   < > 13.0* 12.1*   HCT 28.8* 29.4* 29.3* 30.2* 29.6* 28.9*   < > 39.2* 36.7*   < > 41.4 38.9*    262 250 271 295 252   < > 251 243   < > 206 189    < > = values in this interval not displayed.       Arterial Blood Gas Testing    Recent Labs   Lab Test 05/24/20  1137 05/21/20  0756 05/21/20  0445 05/20/20  2340  05/19/20  0943 05/19/20  0812 05/19/20  0324 05/19/20  0009  05/18/20  1835   PH  --   --   --   --   --  7.40 7.40 7.39 7.35  --  7.43   PCO2  --   --   --   --   --  27* 33* 38 39  --  33*   PO2  --   --   --   --   --  131* 124* 132* 153*  --  323*   HCO3  --   --   --   --   --  17* 21 23 21  --  22   O2PER 24.0 2L 2L 2L   < > 40 40  40 40  40 40  40   < > 100    < > = values in this interval not displayed.        Urine Studies     Recent Labs   Lab Test 08/20/20  1630 08/18/20  1404 05/18/20  0135 05/12/20  2250 12/04/19  1641   URINEPH 5.5 5.5 5.0 5.0 5.0   NITRITE Negative Negative Negative Negative Negative   LEUKEST Small* Moderate* Negative Negative Negative   WBCU 12* 28* 7* 3 15*       Vancomycin Levels     Recent Labs   Lab Test 05/27/20  0837 05/24/20  1726 05/22/20  1704 05/20/20  1145   VANCOMYCIN 26.6* 11.9 12.5 15.3     Microbiology:  Culture Micro   Date Value Ref Range Status   08/20/2020 Culture negative monitoring continues  Preliminary   08/20/2020 No growth after 3 hours  Preliminary   08/20/2020 Culture in progress  Preliminary   08/19/2020 No growth  Final   08/18/2020   Final    <10,000 colonies/mL  urogenital simona  Susceptibility testing not routinely done     08/18/2020 No growth after 3 days  Preliminary   08/18/2020 No growth after 3 days  Preliminary   05/21/2020 No growth  Final   05/21/2020 No growth  Final   05/18/2020 (A)  Final    On day 5, isolated in broth only:  Cutibacterium (Propionibacterium) acnes      2020   Final    Susceptibility testing of Cutibacterium is not done from this source. Our antibiogram   indicates that Cutibacterium species are susceptible to penicillin and cefotaxime, and   most are susceptible to clindamycin.  Sugar Jimenez M.D., Medical Director     2020 Culture negative after 4 weeks  Final   2020 (A)  Final    On day 4, isolated in broth only:  Cutibacterium (Propionibacterium) acnes  Susceptibility testing of Cutibacterium is not done from this source. Our antibiogram   indicates that Cutibacterium species are susceptible to penicillin and cefotaxime, and   most are susceptible to clindamycin.  Sugar Jimenez M.D., Medical Director     2020 No growth  Final   2020 No growth  Final   2019   Final    <10,000 colonies/mL  urogenital simona  Susceptibility testing not routinely done     2019 No growth  Final   2019 No growth  Final   2019 No growth  Final       Last check of C difficile  No results found for: CDBPCT    Imagin20 CXR  IMPRESSION: Increased retrocardiac opacities which may represent  infection versus atelectasis.

## 2020-08-21 NOTE — PLAN OF CARE
Neuro: A&Ox4.   Cardiac: ST. VSS. Afebrile    Respiratory: Sating 92%-100% on RA.  GI/: Adequate urine output. No BM, No nausea.   Diet/appetite: Tolerating low fat diet diet. Eating well.  Activity:  Indep in room   Pain: Denies. At acceptable level on current regimen.   Skin: No new deficits noted.  LDA's: PIV x1 TKO     Plan: Await COVID results. Continue with POC. Notify primary team with changes.

## 2020-08-21 NOTE — PLAN OF CARE
Pt 6B: PT CANCEL    Pt is a COVID rule out patient, and per nursing notes is IND in room; will re-evaluate appropriateness of therapy tomorrow once pt's test results are confirmed and precautions are officially changed.

## 2020-08-21 NOTE — PLAN OF CARE
Neuro: A&O x4.   Cardiac: Sinus tach. B/P is stable  Respiratory: High 90's on RA.   GI/: Good UOP. Not always saving urine. 1 BM.   Diet/appetite: Tolerating low fat diet diet. Eating well.  Activity: Up ad domingo in room.   Pain: Denies pain.   Skin: No new issues.   LDA's: PIV is SL.      Plan: Covid is negative. Provider notified and waiting for them to ok discharging precautions. Continue with POC.

## 2020-08-21 NOTE — PLAN OF CARE
Care Provided 15:30 - 23:30    Neuro: A&Ox4. Follows all commands. Pleasant and cooperative with cares.   Cardiac: ST with HR 100s-110s. BP 100s-110s/70-80s. Afebrile.    Respiratory: Sating *>96 on RA. Lung sounds clear in upper lobes, diminished in lowers. Infrequent productive cough. Pt denied feeling SOB.GI/: Adequate urine output. BM X1  Diet/appetite: Tolerating low saturated fat +2gNa diet. Eating well.  Activity:  Independent in room   Pain: pt denied any pain   Skin: No new deficits noted.  LDA's: L PIV- TKO      Plan:  Re-test for COVID tomorrow 8/21.   Continue with POC. Notify primary team with changes.

## 2020-08-21 NOTE — DISCHARGE SUMMARY
"        [facility], [place]    Cardiology Discharge Summary- Cards 2         Date of Admission:  8/18/2020  Date of Discharge:  8/21/2020  5:08 PM  Discharging Provider: Dr. Gan       Discharge Diagnoses   1. Chronic prostatitis  2. CAP  3. ICM s/p OHT 5/8/20  4. Chronic immunosuppression   5. HTN  6. DMII    Follow-ups Needed After Discharge   Follow-up Appointments     Adult [facility] Follow-up and recommended labs and tests      Follow up with Dr. Jenni Ortiz's clinic within 1-2 weeks  to evaluate   medication change and for hospital follow- up. BMP, Mg, CBC with   differential, and Tacrolimus level.     Appointments on [place] and/or [facility] (with [facility] or [facility]   provider or service). Call [phone] if you haven't heard regarding   these appointments within 7 days of discharge.           Unresulted Labs Ordered in the Past 30 Days of this Admission     Date and Time Order Name Status Description    8/20/2020 2300 Symptomatic COVID-19 Virus (Coronavirus) by PCR In process     8/19/2020 1620 ImmuKnow Immune Cell Function In process     8/19/2020 1531 Yeast culture Preliminary     8/19/2020 1531 Sputum Culture Aerobic Bacterial Preliminary     8/18/2020 1220 Blood culture Preliminary     8/18/2020 1220 Blood culture Preliminary         Discharge Disposition   Discharged to home  Condition at discharge: Stable    History on Presentation:   \"Patient has history of CAD (s/p STEMI with ALYSSA to LAD 6/27/18), ICM (LVEF 20-25%) s/p HM3 LVAD (12/31/18), 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      Patient developed fever to 100.5 tympanic last night in the setting of new cough productive of sputum and exertional dyspnea. He also reports shortness of breath that is new from his baseline and pleuritic chest pain when he coughs. No leg " "swelling. No N/V/D. Contacted the cardiology service who instructed them to come to the emergency department for evaluation. No dysuria, urgency, or frequency.     In the ED, labs significant for undetectable troponin, NT-BNP 1673, WBC 3.3, HGB 9.1, , normal electrolytes, Cr 1.77, and UA with 28 WBC and >182 RBC. Blood/urine cultures pending. 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     # Chronic prostatitis   Concern for UTI  vs prostatitis. Prostate massage with UA/UC (including yeast) performed 8/21 per ID recommendations. UA with pyuria, hematuria, and albuminuria. UC pending.     - Cefdinir 300mg BID x6 weeks (stop date 10/01/2020)    # Elevated PSA, enlarged prostate  Enlarged prostate on examination, no nodules palpated. PSA 10 pre-examination.  - Outpatient urology follow up     # CAP  Symptoms of new fever, cough, and dyspnea in the setting of retrocardiac opacity concerning for CAP. Hemodynamically stable, not hypoxic, COVID negative.  ID workup thus far remarkable for RSV positive for rhinovirus. Treated with Ceftriaxone and Azithromycin. Patient symptomatic improved at time of discharge.      # Status post OHT on 5/18/20, history of ICM  # Chronic immunosuppression  - Rejection history: none  - Recent immunosuppression changes: none  - Last biopsy: 6/30 NER  - Intolerance to medications: none  - Immunosuppression:              - MMF decreased to 1000mg PO BID d/t leukopenia               - Tacrolimus level 13.5, decreased from 3.5mg bid to 3mg bid (8/20 13.7) goal level 10-12              - Prednisone per taper 5 mg PO daily   - PPx:              " - CAV: aspirin 81mg daily, crestor 10mg daily              - PCP: Bactrim stopped 5/27 d/t hyperkalemia, continue pentamidine (last dose 7/28)               - CMV: Valcyte 450mg daily (renally dosed), x6 months (through 11/2020)              - Thrush: Clotrimazole lozenge 10mg QID               - Osteoporosis: Calcium/vitamin D               - GI: Pantoprazole 40mg daily     # Hypertension: Continue pta regimen   # DM Type II: Continue pta regimen      Consultations This Hospital Stay   MEDICATION HISTORY IP PHARMACY CONSULT  INFECTIOUS DISEASE TRANSPLANT SOT ADULT IP CONSULT  PHYSICAL THERAPY ADULT IP CONSULT  NUTRITION SERVICES ADULT IP CONSULT  SMOKING CESSATION PROGRAM IP CONSULT    Code Status   Full Code      Samuel Geller MD  [facility], [place]  ______________________________________________________________________    Physical Exam   Vital Signs: Temp: 98.1  F (36.7  C) Temp src: Oral BP: 98/83 Pulse: 114   Resp: 16 SpO2: 100 % O2 Device: None (Room air)    Weight: 177 lbs 6.4 oz     GEN: Alert, oriented x 3, appears comfortable, NAD.  CV: Regular rate and rhythm, no murmur or JVD.   LUNGS: Clear to auscultation bilaterally   ABD: Active bowel sounds, soft, non-tender/non-distended. No rebound/guarding/rigidity.  EXT: No edema or cyanosis.         Primary Care Physician   Milad Fry    Discharge Orders      Basic metabolic panel     Magnesium     CBC with platelets differential    Last Lab Result: Hemoglobin (g/dL)       Date                     Value                 08/21/2020               8.8 (L)          ----------     Tacrolimus level     UROLOGY ADULT REFERRAL      Reason for your hospital stay    You were admitted for shortness of breath, cough, and fever. You were diagnosed with rhinovirus and community acquired pneumonia for which you were treated. You were also diagnosed with chronic prostatitis and will require 6 weeks of outpatient antibiotic therapy.  Mycophenolate was decreased from 1500 mg twice daily to 1000 mg twice daily. Tacrolimus was decreased from 3.5 mg twice daily to 3 mg twice daily.     Adult [facility] Follow-up and recommended labs and tests    Follow up with Dr. Jenni Ortiz's clinic within 1-2 weeks  to evaluate medication change and for hospital follow- up. BMP, Mg, CBC with differential, and Tacrolimus level.     Appointments on [place] and/or [facility] (with [facility] or [facility] provider or service). Call [phone] if you haven't heard regarding these appointments within 7 days of discharge.     Activity    Your activity upon discharge: activity as tolerated     Full Code     Diet    Follow this diet upon discharge: Orders Placed This Encounter      Combination Diet Low Saturated Fat Na <2400mg Diet; No Caffeine for 24 hours (once tests completed, may have caffeine)       Significant Results and Procedures   Most Recent 3 CBC's:  Recent Labs   Lab Test 08/21/20  0456 08/20/20  0539 08/19/20  0558   WBC 2.3* 2.4* 2.5*   HGB 8.8* 9.0* 8.7*   MCV 91 91 92    262 250     Most Recent 3 BMP's:  Recent Labs   Lab Test 08/21/20  0456 08/20/20  0538 08/19/20  0558    140 139   POTASSIUM 4.0 3.7 3.6   CHLORIDE 109 109 107   CO2 23 23 25   BUN 28 31* 27   CR 1.27* 1.37* 1.31*   ANIONGAP 7 8 6   ARLENE 8.5 8.4* 8.4*   * 114* 69*   ,   Results for orders placed or performed during the hospital encounter of 08/18/20   XR Chest Port 1 View    Narrative    EXAM: XR CHEST PORT 1 VW  8/18/2020 1:20 PM     HISTORY:  fever, cough       COMPARISON:  Chest x-ray 6/15/2020, CT chest 7/29/2020    FINDINGS: Single view of the chest. Post surgical changes in the  chest. Sternotomy wires are intact. Stable catheter projecting over  left lung apex.    Trachea is midline. Stable cardiac mediastinal silhouette. Low lung  volumes. Retrocardiac opacities. No large pleural effusion. No  pneumothorax. Visualized upper abdomen is unremarkable.      Impression     IMPRESSION: Increased retrocardiac opacities which may represent  infection versus atelectasis.    I have personally reviewed the examination and initial interpretation  and I agree with the findings.    ALICIA CEJA MD       Discharge Medications   Discharge Medication List as of 8/21/2020  5:02 PM      CONTINUE these medications which have CHANGED    Details   cefpodoxime (VANTIN) 100 MG/5ML suspension Take 10 mLs (200 mg) by mouth 2 times daily, Disp-840 mL,R-0, E-Prescribe      mycophenolate (GENERIC EQUIVALENT) 500 MG tablet Take 2 tablets (1,000 mg) by mouth 2 times daily, Disp-180 tablet,R-11, E-Prescribe      tacrolimus (GENERIC EQUIVALENT) 1 MG capsule Take 3 capsules (3 mg) by mouth 2 times daily Take 3 capsules with one 0.5mg capule (3.5 mg) by mouth twice a day., Disp-180 capsule,R-11, E-Prescribe         CONTINUE these medications which have NOT CHANGED    Details   acetaminophen (TYLENOL) 325 MG tablet Take 2 tablets (650 mg) by mouth every 4 hours as needed for other (multimodal surgical pain management along with NSAIDS and opioid medication as indicated based on pain control and physical function.), OTC      amLODIPine (NORVASC) 5 MG tablet Take 2 tablets (10 mg) by mouth At Bedtime, Disp-90 tablet,R-3, E-Prescribe      aspirin (ASA) 81 MG chewable tablet Take 1 tablet (81 mg) by mouth daily, Disp-90 tablet, R-3, E-Prescribe      calcium carbonate 600 mg-vitamin D 400 units (CALTRATE) 600-400 MG-UNIT per tablet Take 1 tablet by mouth 2 times daily (with meals), OTC      clotrimazole 10 MG allison Take 1 Allison (10 mg) by mouth 4 times daily, Disp-360 Allison,R-1, E-Prescribe      hydrALAZINE (APRESOLINE) 25 MG tablet Take 1 tablet (25 mg) by mouth 3 times daily, Disp-270 tablet,R-3, E-Prescribe      !! insulin aspart (NOVOLOG PEN) 100 UNIT/ML pen 1 unit / 8  gms CHO with meals,plus correction insulin. Pt uses approx 40 units in 24 hrs., Disp-3 mL,R-3, Historical      !! insulin aspart  (NOVOLOG PEN) 100 UNIT/ML pen Correction Scale - HIGH INSULIN RESISTANCE DOSING     Do Not give Correction Insulin if Pre-Meal BG less than 140.   For Pre-Meal  - 164 give 1 unit.   For Pre-Meal  - 189 give 2 units.   For Pre-Meal  - 214 give 3 units.   For Pre-Meal   - 239 give 4 units.   For Pre-Meal  - 264 give 5 units.   For Pre-Meal  - 289 give 6 units.   For Pre-Meal  - 314 give 7 units.   For Pre-Meal  - 339 give 8 units.   For Pre-Meal  - 364 give 9 units.   For Pre-Meal B G greater than or equal to 365 give 10 units  To be given with prandial insulin, and based on pre-meal blood glucose.   Notify provider if glucose greater than or equal to 350 mg/dL after administration of correction dose. If given at mealtime, administe r within 30 minutes of start of meal, Disp-3 mL,R-0, Local Print      !! insulin aspart (NOVOLOG PEN) 100 UNIT/ML pen Inject 1-7 Units Subcutaneous At Bedtime HIGH INSULIN RESISTANCE DOSING    Do Not give Bedtime Correction Insulin if BG less than 200.   For  - 224 give 1 units.   For  - 249 give 2 units.   For  - 274 give 3 units.   For  - 299 g ruth 4 units.   For  - 324 give 5 units.   For  - 349 give 6 units.   For BG greater than or equal to 350 give 7 units.   Notify provider if glucose greater than or equal to 350 mg/dL after administration of correction dose. If given at mealti me, administer within 30 minutes of start of meal, Disp-3 mL,R-0, Local Print      insulin glargine (LANTUS PEN) 100 UNIT/ML pen Inject 32 Units Subcutaneous every evening Inject 32 units subcutaneous daily., Disp-15 mL,R-3, HistoricalIf Lantus is not covered by insurance, may substitute Basaglar at same dose and frequency.        magnesium oxide (MAG-OX) 400 (241.3 Mg) MG tablet Take 1 tablet (400 mg) by mouth 2 times daily, Disp-90 tablet,R-3, E-Prescribe      pantoprazole (PROTONIX) 40 MG EC tablet TAKE ONE TABLET BY  MOUTH EVERY MORNING BEFORE BREAKFAST, Disp-90 tablet,R-3, E-Prescribe      predniSONE (DELTASONE) 10 MG tablet Take 0.5 tablets (5 mg) by mouth daily, Disp-120 tablet,R-0, E-PrescribeTXP DT 5/18/2020 (Heart) TXP Dischg 5/29/2020 DX Heart transplant Z94.1 TX [facility] ([place], [place])      rosuvastatin (CRESTOR) 10 MG tablet TAKE ONE TABLET BY MOUTH ONCE DAILY, Disp-90 tablet,R-3, E-Prescribe      valGANciclovir (VALCYTE) 450 MG tablet TAKE ONE TABLET (450MG) BY MOUTH DAILY, Disp-30 tablet,R-3, E-Prescribe      ACCU-CHEK [place] PLUS test strip Check BG 3 times daily at meals and at bedtime., Disp-300 strip,R-0,SILVIO, E-Prescribe      BD SILVANA U/F 32G X 4 MM insulin pen needle Use 3-4 daily.Disp-250 each,R-4,DAWE-Prescribe      Continuous Blood Gluc  (DEXCOM G6 ) DON 1 each daily, Disp-1 Device,R-1, E-Prescribe      Continuous Blood Gluc Sensor (DEXCOM G6 SENSOR) MISC 1 each every 10 days, Disp-9 each,R-3, E-Prescribe      Continuous Blood Gluc Transmit (DEXCOM G6 TRANSMITTER) MISC 1 each every 3 months, Disp-1 each,R-3, E-Prescribe       !! - Potential duplicate medications found. Please discuss with provider.      STOP taking these medications       tacrolimus (GENERIC EQUIVALENT) 0.5 MG capsule Comments:   Reason for Stopping:             Allergies   No Known Allergies

## 2020-08-22 LAB
BACTERIA SPEC CULT: NORMAL
SPECIMEN SOURCE: NORMAL

## 2020-08-22 NOTE — PROGRESS NOTES
DISCHARGE                         8/21/2020  5:08 PM  ----------------------------------------------------------------------------  Discharged to: Home  Via: private transportation  Accompanied by: n/a  Discharge Instructions: Cardiac diet, no activity restrictions, medications, follow up appointments, when to call the MD, aftercare instructions.  Prescriptions: To be filled by  D/C pharmacy; medication list reviewed & sent with pt  Follow Up Appointments: arranged; information given  Belongings: All sent with pt  IV: d/c'd  Telemetry: d/c'd  Pt exhibits understanding of above discharge instructions; all questions answered.    Discharge Paperwork: Signed, copied, and sent home with patient.

## 2020-08-23 ENCOUNTER — PATIENT OUTREACH (OUTPATIENT)
Dept: CARE COORDINATION | Facility: CLINIC | Age: 60
End: 2020-08-23

## 2020-08-23 LAB
SARS-COV-2 RNA SPEC QL NAA+PROBE: NORMAL
SPECIMEN SOURCE: NORMAL

## 2020-08-24 ENCOUNTER — TELEPHONE (OUTPATIENT)
Dept: TRANSPLANT | Facility: CLINIC | Age: 60
End: 2020-08-24

## 2020-08-24 LAB
BACTERIA SPEC CULT: NO GROWTH
BACTERIA SPEC CULT: NO GROWTH
IMMUKNOW IMMUNE CELL FUNCTION: 23 NG/ML
SPECIMEN SOURCE: NORMAL
YEAST SPEC QL CULT: NORMAL

## 2020-08-24 NOTE — TELEPHONE ENCOUNTER
Pt called with AM with questions regarding his discharge medications.  Pt is wondering which antibiotics he should be taking.  Writer reached out to discharging MD, Dr. Geller.  Clarified that pt is to be taking 300mg of Omnicef BID for 6 weeks for prostatitis.  Vantin stopped due to insurance coverage issues.  No Bactrim- pentamadine treatments instead.  Pt completed ABX course for pneumonia while inpatient.    Pt called with above information- states understanding.  Will update primary cardiologist with plan.

## 2020-08-24 NOTE — PROGRESS NOTES
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     ASSESSMENT      Discharge Assessment  Patient reports symptoms are: Improved  Does the patient have all of their medications?: No  Does patient know what their new medications are for?: Yes  Does patient have a follow-up appointment scheduled?: No  Does patient have any other questions or concerns?: Yes(medication questions)    Post-op  Did the patient have surgery or a procedure: No  Fever: No  Chills: No  Eating & Drinking: eating and drinking without complaints/concerns  PO Intake: soft foods  Bowel Function: normal  Urinary Status: voiding without complaint/concerns        PLAN                                                      Outpatient Plan:    Follow up with Dr. Jenni Ortiz's clinic within 1-2 weeks  to evaluate   medication change and for hospital follow- up. BMP, Mg, CBC with   differential, and Tacrolimus level.     Future Appointments   Date Time Provider Department Center   8/26/2020  9:30 AM UU LAB GOLD WAITING [facility]   8/26/2020 10:00 AM U2A ROOM 17 [facility]   8/26/2020  1:00 PM Jenni Ortiz MD [facility]   8[facility]28/2020 12:00 [facility]   9/8/2020 11:00 AM Jeannette Schafer PA-C [facility]   9/30/2020  8:30 AM UU LAB GOLD WAITING [facility]   9/30/2020  9:00 AM U2A ROOM 9 [facility]    9/30/2020 12:00 PM Jenni Ortiz MD [facility]   9/30/2020  1:00 PM Xavi Mckeon, [facility]   10/1/2020 10:30 AM [facility]           [place], [facility]

## 2020-08-25 ENCOUNTER — TELEPHONE (OUTPATIENT)
Dept: CARDIOLOGY | Facility: CLINIC | Age: 60
End: 2020-08-25

## 2020-08-25 NOTE — TELEPHONE ENCOUNTER
Call complete for pre procedure reminder, travel screen and updated visitor policy.  Per heart transplant criteria, Covid test not needed. However, pt having new symptoms of cough and SOB with ambulation. Due to the symptoms, pt had covid test on Friday which was negative.  Friday will be >4 days from procedure.  Message sent to care team for next steps.

## 2020-08-26 ENCOUNTER — HOSPITAL ENCOUNTER (OUTPATIENT)
Facility: CLINIC | Age: 60
Discharge: HOME | End: 2020-08-26
Attending: INTERNAL MEDICINE | Admitting: INTERNAL MEDICINE
Payer: COMMERCIAL

## 2020-08-26 ENCOUNTER — APPOINTMENT (OUTPATIENT)
Dept: LAB | Facility: CLINIC | Age: 60
End: 2020-08-26
Attending: INTERNAL MEDICINE
Payer: COMMERCIAL

## 2020-08-26 ENCOUNTER — APPOINTMENT (OUTPATIENT)
Dept: MEDSURG UNIT | Facility: CLINIC | Age: 60
End: 2020-08-26
Attending: INTERNAL MEDICINE
Payer: COMMERCIAL

## 2020-08-26 ENCOUNTER — OFFICE VISIT (OUTPATIENT)
Dept: CARDIOLOGY | Facility: CLINIC | Age: 60
End: 2020-08-26
Attending: INTERNAL MEDICINE
Payer: COMMERCIAL

## 2020-08-26 VITALS
WEIGHT: 178.6 LBS | TEMPERATURE: 98.2 F | BODY MASS INDEX: 30.49 KG/M2 | DIASTOLIC BLOOD PRESSURE: 85 MMHG | HEIGHT: 64 IN | OXYGEN SATURATION: 100 % | HEART RATE: 116 BPM | RESPIRATION RATE: 18 BRPM | SYSTOLIC BLOOD PRESSURE: 122 MMHG

## 2020-08-26 VITALS
DIASTOLIC BLOOD PRESSURE: 75 MMHG | BODY MASS INDEX: 31.07 KG/M2 | SYSTOLIC BLOOD PRESSURE: 115 MMHG | HEART RATE: 127 BPM | HEIGHT: 64 IN | OXYGEN SATURATION: 97 % | WEIGHT: 182 LBS

## 2020-08-26 DIAGNOSIS — Z94.1 HEART REPLACED BY TRANSPLANT (H): ICD-10-CM

## 2020-08-26 DIAGNOSIS — Z94.1 HEART REPLACED BY TRANSPLANT (H): Primary | ICD-10-CM

## 2020-08-26 DIAGNOSIS — Z12.5 SCREENING PSA (PROSTATE SPECIFIC ANTIGEN): ICD-10-CM

## 2020-08-26 LAB
ALBUMIN SERPL-MCNC: 3.1 G/DL (ref 3.4–5)
ALP SERPL-CCNC: 73 U/L (ref 40–150)
ALT SERPL W P-5'-P-CCNC: 19 U/L (ref 0–70)
ANION GAP SERPL CALCULATED.3IONS-SCNC: 7 MMOL/L (ref 3–14)
ANISOCYTOSIS BLD QL SMEAR: SLIGHT
AST SERPL W P-5'-P-CCNC: 20 U/L (ref 0–45)
BASOPHILS # BLD AUTO: 0 10E9/L (ref 0–0.2)
BASOPHILS NFR BLD AUTO: 0 %
BILIRUB SERPL-MCNC: 0.5 MG/DL (ref 0.2–1.3)
BUN SERPL-MCNC: 41 MG/DL (ref 7–30)
BURR CELLS BLD QL SMEAR: SLIGHT
CALCIUM SERPL-MCNC: 9.3 MG/DL (ref 8.5–10.1)
CHLORIDE SERPL-SCNC: 105 MMOL/L (ref 94–109)
CO2 SERPL-SCNC: 25 MMOL/L (ref 20–32)
CREAT SERPL-MCNC: 1.91 MG/DL (ref 0.66–1.25)
DIFFERENTIAL METHOD BLD: ABNORMAL
EOSINOPHIL # BLD AUTO: 0 10E9/L (ref 0–0.7)
EOSINOPHIL NFR BLD AUTO: 0 %
ERYTHROCYTE [DISTWIDTH] IN BLOOD BY AUTOMATED COUNT: 15.9 % (ref 10–15)
ERYTHROCYTE [DISTWIDTH] IN BLOOD BY AUTOMATED COUNT: NORMAL % (ref 10–15)
GFR SERPL CREATININE-BSD FRML MDRD: 38 ML/MIN/{1.73_M2}
GLUCOSE SERPL-MCNC: 164 MG/DL (ref 70–99)
HCT VFR BLD AUTO: 29 % (ref 40–53)
HCT VFR BLD AUTO: NORMAL % (ref 40–53)
HGB BLD-MCNC: 9 G/DL (ref 13.3–17.7)
HGB BLD-MCNC: NORMAL G/DL (ref 13.3–17.7)
INR PPP: 1.06 (ref 0.86–1.14)
LYMPHOCYTES # BLD AUTO: 0.4 10E9/L (ref 0.8–5.3)
LYMPHOCYTES NFR BLD AUTO: 12.2 %
MAGNESIUM SERPL-MCNC: 1.7 MG/DL (ref 1.6–2.3)
MCH RBC QN AUTO: 27.9 PG (ref 26.5–33)
MCH RBC QN AUTO: NORMAL PG (ref 26.5–33)
MCHC RBC AUTO-ENTMCNC: 31 G/DL (ref 31.5–36.5)
MCHC RBC AUTO-ENTMCNC: NORMAL G/DL (ref 31.5–36.5)
MCV RBC AUTO: 90 FL (ref 78–100)
MCV RBC AUTO: NORMAL FL (ref 78–100)
MONOCYTES # BLD AUTO: 0.1 10E9/L (ref 0–1.3)
MONOCYTES NFR BLD AUTO: 2.6 %
NEUTROPHILS # BLD AUTO: 3 10E9/L (ref 1.6–8.3)
NEUTROPHILS NFR BLD AUTO: 85.2 %
NRBC # BLD AUTO: 0 10*3/UL
NRBC BLD AUTO-RTO: 1 /100
OVALOCYTES BLD QL SMEAR: SLIGHT
PHOSPHATE SERPL-MCNC: 3 MG/DL (ref 2.5–4.5)
PLATELET # BLD AUTO: 315 10E9/L (ref 150–450)
PLATELET # BLD AUTO: NORMAL 10E9/L (ref 150–450)
PLATELET # BLD EST: ABNORMAL 10*3/UL
POTASSIUM SERPL-SCNC: 4.1 MMOL/L (ref 3.4–5.3)
PROT SERPL-MCNC: 6.8 G/DL (ref 6.8–8.8)
RBC # BLD AUTO: 3.23 10E12/L (ref 4.4–5.9)
RBC # BLD AUTO: NORMAL 10E12/L (ref 4.4–5.9)
SODIUM SERPL-SCNC: 137 MMOL/L (ref 133–144)
TACROLIMUS BLD-MCNC: 12.8 UG/L (ref 5–15)
TME LAST DOSE: NORMAL H
WBC # BLD AUTO: 3.5 10E9/L (ref 4–11)
WBC # BLD AUTO: NORMAL 10E9/L (ref 4–11)

## 2020-08-26 PROCEDURE — 99214 OFFICE O/P EST MOD 30 MIN: CPT | Mod: 25 | Performed by: INTERNAL MEDICINE

## 2020-08-26 PROCEDURE — 85610 PROTHROMBIN TIME: CPT | Performed by: INTERNAL MEDICINE

## 2020-08-26 PROCEDURE — 88346 IMFLUOR 1ST 1ANTB STAIN PX: CPT | Performed by: INTERNAL MEDICINE

## 2020-08-26 PROCEDURE — 93505 ENDOMYOCARDIAL BIOPSY: CPT | Mod: 26 | Performed by: INTERNAL MEDICINE

## 2020-08-26 PROCEDURE — 88307 TISSUE EXAM BY PATHOLOGIST: CPT | Performed by: INTERNAL MEDICINE

## 2020-08-26 PROCEDURE — 80053 COMPREHEN METABOLIC PANEL: CPT | Performed by: INTERNAL MEDICINE

## 2020-08-26 PROCEDURE — 85025 COMPLETE CBC W/AUTO DIFF WBC: CPT | Performed by: INTERNAL MEDICINE

## 2020-08-26 PROCEDURE — G0463 HOSPITAL OUTPT CLINIC VISIT: HCPCS | Mod: ZF

## 2020-08-26 PROCEDURE — 36415 COLL VENOUS BLD VENIPUNCTURE: CPT | Performed by: INTERNAL MEDICINE

## 2020-08-26 PROCEDURE — 84100 ASSAY OF PHOSPHORUS: CPT | Performed by: INTERNAL MEDICINE

## 2020-08-26 PROCEDURE — 40000166 ZZH STATISTIC PP CARE STAGE 1

## 2020-08-26 PROCEDURE — 27210794 ZZH OR GENERAL SUPPLY STERILE: Performed by: INTERNAL MEDICINE

## 2020-08-26 PROCEDURE — 80197 ASSAY OF TACROLIMUS: CPT | Performed by: INTERNAL MEDICINE

## 2020-08-26 PROCEDURE — 25000125 ZZHC RX 250: Performed by: INTERNAL MEDICINE

## 2020-08-26 PROCEDURE — 83735 ASSAY OF MAGNESIUM: CPT | Performed by: INTERNAL MEDICINE

## 2020-08-26 PROCEDURE — C1894 INTRO/SHEATH, NON-LASER: HCPCS | Performed by: INTERNAL MEDICINE

## 2020-08-26 PROCEDURE — 93505 ENDOMYOCARDIAL BIOPSY: CPT | Performed by: INTERNAL MEDICINE

## 2020-08-26 PROCEDURE — 88350 IMFLUOR EA ADDL 1ANTB STN PX: CPT | Performed by: INTERNAL MEDICINE

## 2020-08-26 RX ORDER — LIDOCAINE 40 MG/G
CREAM TOPICAL
Status: COMPLETED | OUTPATIENT
Start: 2020-08-26 | End: 2020-08-26

## 2020-08-26 RX ADMIN — LIDOCAINE: 40 CREAM TOPICAL at 10:35

## 2020-08-26 ASSESSMENT — PAIN SCALES - GENERAL: PAINLEVEL: NO PAIN (0)

## 2020-08-26 ASSESSMENT — MIFFLIN-ST. JEOR
SCORE: 1546.12
SCORE: 1561.55

## 2020-08-26 NOTE — NURSING NOTE
Chief Complaint   Patient presents with     Follow Up      Return CORE; 57 year old male with HFrEF with EF 15%, presenting for HF follow-up with labs prior.     Vitals were taken and medications were reconciled.    Michael Rivas CMA    1:16 PM

## 2020-08-26 NOTE — IP AVS SNAPSHOT
MRN:[id number]                      After Visit Summary   8/26/2020    Mariusz Sharp    MRN: [id number]           Visit Information        Department      8/26/2020  8:42 AM [address] [facility]          Review of your medicines      UNREVIEWED medicines. Ask your doctor about these medicines       Dose / Directions   acetaminophen 325 MG tablet  Commonly known as:  TYLENOL  Used for:  Status post heart transplantation (H)      Dose:  650 mg  Take 2 tablets (650 mg) by mouth every 4 hours as needed for other (multimodal surgical pain management along with NSAIDS and opioid medication as indicated based on pain control and physical function.)  Quantity:     Refills:  0     amLODIPine 5 MG tablet  Commonly known as:  NORVASC  Used for:  Other secondary hypertension      Dose:  10 mg  Take 2 tablets (10 mg) by mouth At Bedtime  Quantity:  90 tablet  Refills:  3     aspirin 81 MG chewable tablet  Commonly known as:  ASA  Used for:  LVAD (left ventricular assist device) present (H)      Dose:  81 mg  Take 1 tablet (81 mg) by mouth daily  Quantity:  90 tablet  Refills:  3     calcium carbonate 600 mg-vitamin D 400 units 600-400 MG-UNIT per tablet  Commonly known as:  CALTRATE  Used for:  Status post heart transplantation (H)      Dose:  1 tablet  Take 1 tablet by mouth 2 times daily (with meals)  Quantity:     Refills:  0     cefdinir 300 MG capsule  Commonly known as:  OMNICEF  Used for:  Prostatitis, chronic      Dose:  300 mg  Take 1 capsule (300 mg) by mouth 2 times daily  Quantity:  84 capsule  Refills:  0     clotrimazole 10 MG lozenge  Commonly known as:  MYCELEX  Used for:  Status post heart transplantation (H)      Dose:  10 mg  Take 1 Allison (10 mg) by mouth 4 times daily  Quantity:  360 Allison  Refills:  1     hydrALAZINE 25 MG tablet  Commonly known as:  APRESOLINE  Used for:  Heart replaced by transplant (H)      Dose:  25 mg  Take 1 tablet (25 mg) by mouth 3 times daily  Quantity:  270  tablet  Refills:  3     * insulin aspart 100 UNIT/ML pen  Commonly known as:  NovoLOG PEN  Used for:  Status post heart transplantation (H)      Correction Scale - HIGH INSULIN RESISTANCE DOSING     Do Not give Correction Insulin if Pre-Meal BG less than 140.   For Pre-Meal  - 164 give 1 unit.   For Pre-Meal  - 189 give 2 units.   For Pre-Meal  - 214 give 3 units.   For Pre-Meal  - 239 give 4 units.   For Pre-Meal  - 264 give 5 units.   For Pre-Meal  - 289 give 6 units.   For Pre-Meal  - 314 give 7 units.   For Pre-Meal  - 339 give 8 units.   For Pre-Meal  - 364 give 9 units.   For Pre-Meal BG greater than or equal to 365 give 10 units  To be given with prandial insulin, and based on pre-meal blood glucose.   Notify provider if glucose greater than or equal to 350 mg/dL after administration of correction dose. If given at mealtime, administer within 30 minutes of start of meal  Quantity:  3 mL  Refills:  0     * insulin aspart 100 UNIT/ML pen  Commonly known as:  NovoLOG PEN  Used for:  Status post heart transplantation (H)      Dose:  1-7 Units  Inject 1-7 Units Subcutaneous At Bedtime HIGH INSULIN RESISTANCE DOSING    Do Not give Bedtime Correction Insulin if BG less than 200.   For  - 224 give 1 units.   For  - 249 give 2 units.   For  - 274 give 3 units.   For  - 299 give 4 units.   For  - 324 give 5 units.   For  - 349 give 6 units.   For BG greater than or equal to 350 give 7 units.   Notify provider if glucose greater than or equal to 350 mg/dL after administration of correction dose. If given at mealtime, administer within 30 minutes of start of meal  Quantity:  3 mL  Refills:  0     * insulin aspart 100 UNIT/ML pen  Commonly known as:  NovoLOG PEN  Used for:  Status post heart transplantation (H)      1 unit / 8  gms CHO with meals,plus correction insulin. Pt uses approx 40 units in 24 hrs.  Quantity:  3 mL  Refills:   3     insulin glargine 100 UNIT/ML pen  Commonly known as:  LANTUS PEN  Used for:  Status post heart transplantation (H)      Dose:  32 Units  Inject 32 Units Subcutaneous every evening Inject 32 units subcutaneous daily.  Quantity:  15 mL  Refills:  3     magnesium oxide 400 (241.3 Mg) MG tablet  Commonly known as:  MAG-OX  Used for:  Hypomagnesemia      Dose:  400 mg  Take 1 tablet (400 mg) by mouth 2 times daily  Quantity:  90 tablet  Refills:  3     mycophenolate 500 MG tablet  Commonly known as:  GENERIC EQUIVALENT  Used for:  Status post heart transplantation (H)      Dose:  1,000 mg  Take 2 tablets (1,000 mg) by mouth 2 times daily  Quantity:  180 tablet  Refills:  11     pantoprazole 40 MG EC tablet  Commonly known as:  PROTONIX  Used for:  Status post heart transplantation (H)      TAKE ONE TABLET BY MOUTH EVERY MORNING BEFORE BREAKFAST  Quantity:  90 tablet  Refills:  3     predniSONE 10 MG tablet  Commonly known as:  DELTASONE  Used for:  Status post heart transplantation (H)      Dose:  5 mg  Take 0.5 tablets (5 mg) by mouth daily  Quantity:  120 tablet  Refills:  0     rosuvastatin 10 MG tablet  Commonly known as:  CRESTOR  Used for:  Status post heart transplantation (H)      TAKE ONE TABLET BY MOUTH ONCE DAILY  Quantity:  90 tablet  Refills:  3     tacrolimus 1 MG capsule  Commonly known as:  GENERIC EQUIVALENT  Used for:  Status post heart transplantation (H)      Dose:  3 mg  Take 3 capsules (3 mg) by mouth 2 times daily Take 3mg BID  Quantity:  180 capsule  Refills:  11     valGANciclovir 450 MG tablet  Commonly known as:  VALCYTE  Used for:  Status post heart transplantation (H)      TAKE ONE TABLET (450MG) BY MOUTH DAILY  Quantity:  30 tablet  Refills:  3         * This list has 3 medication(s) that are the same as other medications prescribed for you. Read the directions carefully, and ask your doctor or other care provider to review them with you.            CONTINUE these medicines which have  NOT CHANGED       Dose / Directions   Accu-Chek Denise Plus test strip  Used for:  Status post heart transplantation (H)  Generic drug:  blood glucose      Check BG 3 times daily at meals and at bedtime.  Quantity:  300 strip  Refills:  0     BD SILVANA U/F 32G X 4 MM miscellaneous  Used for:  Type 2 diabetes mellitus with hyperglycemia, with long-term current use of insulin (H)  Generic drug:  insulin pen needle      Use 3-4 daily.  Quantity:  250 each  Refills:  4     Dexcom G6  Monie  Used for:  Type 2 diabetes mellitus with hyperglycemia, with long-term current use of insulin (H)      Dose:  1 each  1 each daily  Quantity:  1 Device  Refills:  1     Dexcom G6 Sensor Misc  Used for:  Type 2 diabetes mellitus with hyperglycemia, with long-term current use of insulin (H)      Dose:  1 each  1 each every 10 days  Quantity:  9 each  Refills:  3     Dexcom G6 Transmitter Misc  Used for:  Type 2 diabetes mellitus with hyperglycemia, with long-term current use of insulin (H)      Dose:  1 each  1 each every 3 months  Quantity:  1 each  Refills:  3              Protect others around you: Learn how to safely use, store and throw away your medicines at www.disposemymeds.org.       Follow-ups after your visit       Your next 10 appointments already scheduled    Aug 26, 2020  Procedure with Khris Mcclelland MD  [facility],  Heart Cath Lab ([facility], [facility]) [address] [place] [id number] [phone]   The [facility] is located on the corner of [facility] and [facility] on the [facility]. It is easily accessible from virtually any point in the [facility] area, via [address] and [address].   Aug 26, 2020  1:00 PM CDT  (Arrive by 12:45 PM)  RETURN HEART TRANSPLANT with Jenni Ortiz MD  [facility] ([facility]) [address] [facility] [address] [phone]       Aug 28, 2020 12:00 PM CDT  Telephone Visit with Kenton Carlos MD  [facility]rology and [facility]rolog[facility] ([facility]) [address] [facility] [address] [facility]  [address]th [facility] [id number] [phone]   [facility]  Note: this is not an onsite visit; there is no need to come to the facility.  Please have a list of all current medications available for appointment.      Sep 08, 2020 11:00 AM CDT  Telephone Visit with Jeannette Schafer PA-C  [facility] Endocrinology ([facility]) [address] [facility]  3rd [facility] [id number] [phone]   [facility] Endocrinology  Note: this is not an onsite visit; there is no need to come to the facility.  Please have a list of all current medications available for appointment.      Sep 30, 2020  8:30 AM CDT  LAB with UU LAB GOLD WAITING  [facility] ([facility]) [address] [facility] [address]   Please do not eat 10-12 hours before your appointment if you are coming in fasting for labs on lipids, cholesterol, or glucose (sugar). Does not apply to pregnant women. Water, tea and black coffee (with nothing added) is okay. Do not drink other fluids, diet soda or gum. If you have concerns about taking your medications, please send a message by clicking on Secure Messaging, Message Your Care Team.     Sep 30, 2020  9:00 AM CDT  Procedure - 2.5 hour with U2A ROOM 9  [address] [facility] ([facility]) [address] [facility] [address]      Sep 30, 2020  Procedure with Cardiologist MD Maddie  [facility],  Heart Cath Lab ([facility], [facility]) [address] [facility] [address] [phone]   The [facility] is located on the corner of [facility]  [facility] and [facility] on the [facility]. It is easily accessible from virtually any point in the [facility] area, via [address] and [address].   Sep 30, 2020 12:00 PM CDT  (Arrive by 11:45 AM)  RETURN HEART TRANSPLANT with Jenni Ortiz MD  [facility] Care ([facility]) [address] [place] [address]  [phone]      Sep 30, 2020  1:00 PM CDT  (Arrive by 12:45 PM)  Office Visit with Xavi Mckeon [facility] Medication Therapy Management ([facility]) [address] [place] [address]  3rd [facility] [id number] [phone]   Bring a current list of meds and any records pertaining to this visit.  For Physicals, please bring immunization records and any forms needing to be filled out. Please arrive 10 minutes early to complete paperwork.     Oct 01, 2020 10:30 AM CDT  (Arrive by 10:15 AM)  Video Visit with Donny Gomes MD  [facility]rology and Inst for Prostate and Urologic Cancers ([facility]) [address] [place] [address]  [address] [facility] [id number] [phone]   [facility] for Prostate and Urologic Cancers  Note: this is not an onsite visit; there is no need to come to the facility.  Please have a list of all current medications available for appointment.         Care Instructions       Further instructions from your care team       [facility]                        Interventional Cardiology  Discharge Instructions   Post Right Heart Cath and Biopsy      AFTER YOU GO HOME:    DO drink plenty of fluids    DO resume your regular diet and medications unless otherwise instructed by your Primary Physician    Do Not scrub the procedure site vigorously    No lotion or powder to the puncture site for 3 days    CALL YOUR PRIMARY PHYSICIAN IF: You may resume all normal activity.  Monitor neck site for bleeding, swelling, or voice changes.  If you notice bleeding or swelling immediately apply pressure to the site and call number below to speak with Cardiology Fellow.  If you experience any changes in your breathing you should call your doctor immediately or come to the closest Emergency Department.  Do not drive yourself.    ADDITIONAL INSTRUCTIONS: Medications: You are to resume all home medications including anticoagulation therapy unless otherwise advised by your primary cardiologist or nurse coordinator.    Follow Up: Per your primary cardiology team    If you have any questions or concerns regarding your procedure site please call [phone] at anytime and ask for Cardiology Fellow on call.  They are available 24 hours a day.  You may also contact the Cardiology Clinic after hours number at [phone].                                                       Telephone Numbers [phone] Monday-Friday 8:00 am to 4:30 pm    [phone] After 4:30 pm Monday-Friday, Weekends & Holidays  Ask for Interventional Cardiologist on call. Someone is on call 24 hours/day   [facility] toll free number [phone] Monday-Friday 8:00 am to 4:30 pm   [facility] Emergency Dept [phone]                   Additional Information About Your Visit       [employer] Information    [employer] gives you secure access to your electronic health record. If you see a primary care provider, you can also send messages to your care team and make appointments. If you have questions, please call your primary care clinic.  If you do not have a primary care provider, please call [phone] and they will assist you.       Care EveryWhere ID    This is your Care EveryWhere ID. This could be used by other organizations to access your [place] medical records  [id number]       Your Vitals Were  Most recent update: 8/26/2020 10:28 AM    Blood Pressure   139/96   (BP Location: Right arm)          Pulse   114          Temperature   98.2  F (36.8  C) (Oral)          Respirations  "  20          Height   1.626 m (5' 4\")             Weight   81 kg (178 lb 9.6 oz)    Pulse Oximetry   98%    BMI (Body Mass Index)   30.66 kg/m           Primary Care Provider Office Phone # Fax #    Milad Fry [phone]      Equal Access to Services    [facility]: Hadii aad ku hadasho Soomaali, waaxda luqadaha, qaybta kaalmada adeegyada, mynor lynnettein hayaan adegalileo gordon ladebbien . So [facility] [phone].    ATENCIÓN: Si habla español, tiene a waddell disposición servicios gratuitos de asistencia lingüística. Trent al [phone].    We comply with applicable federal and state civil rights laws, including the [place] Human Rights Act. We do not discriminate on the basis of race, color, creed, Tenriism, national origin, marital status, age, disability, sex, sexual orientation, or gender identity.       Thank you!    Thank you for choosing [facility] for your care. Our goal is always to provide you with excellent care. Hearing back from our patients is one way we can continue to improve our services. Please take a few minutes to complete the written survey that you may receive in the mail after you visit with us. Thank you!            Medication List      Medications          Morning Afternoon Evening Bedtime As Needed    Accu-Chek Denise Plus test strip  INSTRUCTIONS:  Check BG 3 times daily at meals and at bedtime.  Generic drug:  blood glucose                     BD SILVANA U/F 32G X 4 MM miscellaneous  INSTRUCTIONS:  Use 3-4 daily.  Generic drug:  insulin pen needle                     Dexcom G6  Monie  INSTRUCTIONS:  1 each daily                     Dexcom G6 Sensor Misc  INSTRUCTIONS:  1 each every 10 days                     Dexcom G6 Transmitter Misc  INSTRUCTIONS:  1 each every 3 months                       ASK your doctor about these medications          Morning Afternoon Evening Bedtime As Needed    acetaminophen 325 MG tablet  Also known as:  TYLENOL  INSTRUCTIONS:  Take 2 tablets " (650 mg) by mouth every 4 hours as needed for other (multimodal surgical pain management along with NSAIDS and opioid medication as indicated based on pain control and physical function.)                     amLODIPine 5 MG tablet  Also known as:  NORVASC  INSTRUCTIONS:  Take 2 tablets (10 mg) by mouth At Bedtime                     aspirin 81 MG chewable tablet  Also known as:  ASA  INSTRUCTIONS:  Take 1 tablet (81 mg) by mouth daily                     calcium carbonate 600 mg-vitamin D 400 units 600-400 MG-UNIT per tablet  Also known as:  CALTRATE  INSTRUCTIONS:  Take 1 tablet by mouth 2 times daily (with meals)                     cefdinir 300 MG capsule  Also known as:  OMNICEF  INSTRUCTIONS:  Take 1 capsule (300 mg) by mouth 2 times daily                     clotrimazole 10 MG lozenge  Also known as:  MYCELEX  INSTRUCTIONS:  Take 1 Allison (10 mg) by mouth 4 times daily                     hydrALAZINE 25 MG tablet  Also known as:  APRESOLINE  INSTRUCTIONS:  Take 1 tablet (25 mg) by mouth 3 times daily                     * insulin aspart 100 UNIT/ML pen  Also known as:  NovoLOG PEN  INSTRUCTIONS:  Correction Scale - HIGH INSULIN RESISTANCE DOSING     Do Not give Correction Insulin if Pre-Meal BG less than 140.   For Pre-Meal  - 164 give 1 unit.   For Pre-Meal  - 189 give 2 units.   For Pre-Meal  - 214 give 3 units.   For Pre-Meal  - 239 give 4 units.   For Pre-Meal  - 264 give 5 units.   For Pre-Meal  - 289 give 6 units.   For Pre-Meal  - 314 give 7 units.   For Pre-Meal  - 339 give 8 units.   For Pre-Meal  - 364 give 9 units.   For Pre-Meal BG greater than or equal to 365 give 10 units  To be given with prandial insulin, and based on pre-meal blood glucose.   Notify provider if glucose greater than or equal to 350 mg/dL after administration of correction dose. If given at mealtime, administer within 30 minutes of start of meal                     * insulin  aspart 100 UNIT/ML pen  Also known as:  NovoLOG PEN  INSTRUCTIONS:  Inject 1-7 Units Subcutaneous At Bedtime HIGH INSULIN RESISTANCE DOSING    Do Not give Bedtime Correction Insulin if BG less than 200.   For  - 224 give 1 units.   For  - 249 give 2 units.   For  - 274 give 3 units.   For  - 299 give 4 units.   For  - 324 give 5 units.   For  - 349 give 6 units.   For BG greater than or equal to 350 give 7 units.   Notify provider if glucose greater than or equal to 350 mg/dL after administration of correction dose. If given at mealtime, administer within 30 minutes of start of meal                     * insulin aspart 100 UNIT/ML pen  Also known as:  NovoLOG PEN  INSTRUCTIONS:  1 unit / 8  gms CHO with meals,plus correction insulin. Pt uses approx 40 units in 24 hrs.                     insulin glargine 100 UNIT/ML pen  Also known as:  LANTUS PEN  INSTRUCTIONS:  Inject 32 Units Subcutaneous every evening Inject 32 units subcutaneous daily.  Doctor's comments:  If Lantus is not covered by insurance, may substitute Basaglar at same dose and frequency.                       magnesium oxide 400 (241.3 Mg) MG tablet  Also known as:  MAG-OX  INSTRUCTIONS:  Take 1 tablet (400 mg) by mouth 2 times daily                     mycophenolate 500 MG tablet  Also known as:  GENERIC EQUIVALENT  INSTRUCTIONS:  Take 2 tablets (1,000 mg) by mouth 2 times daily                     pantoprazole 40 MG EC tablet  Also known as:  PROTONIX  INSTRUCTIONS:  TAKE ONE TABLET BY MOUTH EVERY MORNING BEFORE BREAKFAST                     predniSONE 10 MG tablet  Also known as:  DELTASONE  INSTRUCTIONS:  Take 0.5 tablets (5 mg) by mouth daily  Doctor's comments:  TXP DT 5/18/2020 (Heart) TXP Dischg 5/29/2020 DX Heart transplant Z94.1 [facility] ([place], [place])                     rosuvastatin 10 MG tablet  Also known as:  CRESTOR  INSTRUCTIONS:  TAKE ONE TABLET BY MOUTH ONCE DAILY                      tacrolimus 1 MG capsule  Also known as:  GENERIC EQUIVALENT  INSTRUCTIONS:  Take 3 capsules (3 mg) by mouth 2 times daily Take 3mg BID                     valGANciclovir 450 MG tablet  Also known as:  VALCYTE  INSTRUCTIONS:  TAKE ONE TABLET (450MG) BY MOUTH DAILY                        * This list has 3 medication(s) that are the same as other medications prescribed for you. Read the directions carefully, and ask your doctor or other care provider to review them with you.

## 2020-08-26 NOTE — PATIENT INSTRUCTIONS
Patient Instructions  1. Recheck Tacrolimus level next week.  2. In one week have CBC, BMP, and PSA drawn.  2. If your biopsy is negative- you can stop your prednisone.  3. Stop the Clotimazole trouches now.    Next transplant clinic appointment:  In one month  Next lab draw: next week at [place] clinic  Coordinator will call with all pending results.     Please call transplant coordinator with any questions:    Angelika Muller RN BSN   Post Heart Transplant Nurse Coordinator  [facility]  Questions: [phone]

## 2020-08-26 NOTE — IP AVS SNAPSHOT
[address] [address]                                    After Visit Summary   8/26/2020    Mariusz Sharp    MRN: [id number]           After Visit Summary Signature Page    I have received my discharge instructions, and my questions have been answered. I have discussed any challenges I see with this plan with the nurse or doctor.    ..........................................................................................................................................  Patient/Patient Representative Signature      ..........................................................................................................................................  Patient Representative Print Name and Relationship to Patient    ..................................................               ................................................  Date                                   Time    ..........................................................................................................................................  Reviewed by Signature/Title    ...................................................              ..............................................  Date                                               Time          22EPIC Rev 08/18

## 2020-08-26 NOTE — DISCHARGE INSTRUCTIONS
[facility]                        Interventional Cardiology  Discharge Instructions   Post Right Heart Cath and Biopsy      AFTER YOU GO HOME:    DO drink plenty of fluids    DO resume your regular diet and medications unless otherwise instructed by your Primary Physician    Do Not scrub the procedure site vigorously    No lotion or powder to the puncture site for 3 days    CALL YOUR PRIMARY PHYSICIAN IF: You may resume all normal activity.  Monitor neck site for bleeding, swelling, or voice changes. If you notice bleeding or swelling immediately apply pressure to the site and call number below to speak with Cardiology Fellow.  If you experience any changes in your breathing you should call your doctor immediately or come to the closest Emergency Department.  Do not drive yourself.    ADDITIONAL INSTRUCTIONS: Medications: You are to resume all home medications including anticoagulation therapy unless otherwise advised by your primary cardiologist or nurse coordinator.    Follow Up: Per your primary cardiology team    If you have any questions or concerns regarding your procedure site please call [phone] at anytime and ask for Cardiology Fellow on call.  They are available 24 hours a day.  You may also contact the Cardiology Clinic after hours number at [phone].                                                       Telephone Numbers [phone] Monday-Friday 8:00 am to 4:30 pm    [phone] After 4:30 pm Monday-Friday, Weekends & Holidays  Ask for Interventional Cardiologist on call. Someone is on call 24 hours/day   [facility] toll free number [phone] Monday-Friday 8:00 am to 4:30 pm   [facility] Emergency Dept [phone]

## 2020-08-26 NOTE — PROGRESS NOTES
Pt on 2A post right heart cath; pt awake and alert, denies pain. R neck site is soft, flat, dry and intact. Pt taking PO water without problem. Discharge instructions reviewed with copy to pt; stated understanding. 1222--discharge to self care.

## 2020-08-26 NOTE — PROGRESS NOTES
Prep and teaching complete for right heart cath and biopsy; pt awake and alert, has cold symptoms; diagnoses rhinovirus per pt. Pt's own auto glucose meter is 181 now. Awaiting consent.

## 2020-08-26 NOTE — LETTER
8/26/2020      RE: Mariusz Sharp  [address] [facility] [address]       Dear Colleague,    Thank you for the opportunity to participate in the care of your patient, Mariusz Sharp, at the [facility]. Please see a copy of my visit note below.    ADULT HEART TRANSPLANT     HPI:   Mr. Sharp is a 59 year old male who presents today after biopsy for routine heart transplant follow-up.      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  He underwent heart transplant May 18, 2020.  Since his transplantation he has had normal graft function, with largely normal right heart catheterizations (occasionally may have elevated wedge pressures), but with normal outputs and without DSAs.  His biopsies have remained negative for significant rejection.  His postop course was complicated by some nonsustained VT, with some leukocytosis thought to be due to c acnes growing from his former LVAD site.  This was thought to be a possible contaminant but it was decided to still treat him with antibiotics for a full course.  His donor blood also grew  S anginosus and Veillonella, and this tube was also thought to be a contaminant but was also treated with a full course of antibiotics.    Unfortunately, patient was hospitalized last week with fevers, later found to have rhinovirus and potentially chronic prostatitis.  He was started on oral cefdinir per transplant ID recommendations.  Feels that his cough and fevers have improved.   He otherwise says he is doing quite well.  Review of systems negative as noted below.   Denies orthopnea, PND, abdominal bloating. Patient denies oral lesions, rashes,  lightheadedness, dizziness, headaches, chest pain, palpitations, nausea, vomiting, diarrhea.      PAST MEDICAL HISTORY:  Past Medical History:   Diagnosis Date     Acute kidney injury (H)      CKD (chronic kidney disease)      Coronary artery disease      Diabetes mellitus (H)      HTN (hypertension)      Hyperlipidemia      ICD (implantable cardioverter-defibrillator), single chamber- NOT dependent 7/17/2018     Ischemic cardiomyopathy      LV (left ventricular) mural thrombus      Paroxysmal atrial flutter (H)      RVF (right ventricular failure) (H)      Systolic heart failure (H)      Ventricular tachycardia (H)      CURRENT MEDICATIONS:  ACCU-CHEK [place] PLUS test strip, Check BG 3 times daily at meals and at bedtime.  acetaminophen (TYLENOL) 325 MG tablet, Take 2 tablets (650 mg) by mouth every 4 hours as needed for other (multimodal surgical pain management along with NSAIDS and opioid medication as indicated based on pain control and physical function.)  amLODIPine (NORVASC) 5 MG tablet, Take 2 tablets (10 mg) by mouth At Bedtime  aspirin (ASA) 81 MG chewable tablet, Take 1 tablet (81 mg) by mouth daily  BD SILVANA U/F 32G X 4 MM insulin pen needle, Use 3-4 daily.  calcium carbonate 600 mg-vitamin D 400 units (CALTRATE) 600-400 MG-UNIT per tablet, Take 1 tablet by mouth 2 times daily (with meals)  cefdinir (OMNICEF) 300 MG capsule, Take 1 capsule (300 mg) by mouth 2 times daily  clotrimazole 10 MG sushant, Take 1 Sushant (10 mg) by mouth 4 times daily  Continuous Blood Gluc  (DEXCOM G6 ) DON, 1 each daily  Continuous Blood Gluc Sensor (DEXCOM G6 SENSOR) MISC, 1 each every 10 days  Continuous Blood Gluc Transmit (DEXCOM G6 TRANSMITTER) MISC, 1 each every 3 months  hydrALAZINE (APRESOLINE) 25 MG tablet, Take 1 tablet (25 mg) by mouth 3 times daily  insulin aspart (NOVOLOG PEN) 100 UNIT/ML pen, 1 unit / 8  gms CHO with meals,plus correction insulin. Pt uses approx 40 units in 24 hrs.  insulin aspart  (NOVOLOG PEN) 100 UNIT/ML pen, Correction Scale - HIGH INSULIN RESISTANCE DOSING     Do Not give Correction Insulin if Pre-Meal BG less than 140.   For Pre-Meal  - 164 give 1 unit.   For Pre-Meal  - 189 give 2 units.   For Pre-Meal  - 214 give 3 units.   For Pre-Meal  - 239 give 4 units.   For Pre-Meal  - 264 give 5 units.   For Pre-Meal  - 289 give 6 units.   For Pre-Meal  - 314 give 7 units.   For Pre-Meal  - 339 give 8 units.   For Pre-Meal  - 364 give 9 units.   For Pre-Meal BG greater than or equal to 365 give 10 units  To be given with prandial insulin, and based on pre-meal blood glucose.   Notify provider if glucose greater than or equal to 350 mg/dL after administration of correction dose. If given at mealtime, administer within 30 minutes of start of meal  insulin aspart (NOVOLOG PEN) 100 UNIT/ML pen, Inject 1-7 Units Subcutaneous At Bedtime HIGH INSULIN RESISTANCE DOSING    Do Not give Bedtime Correction Insulin if BG less than 200.   For  - 224 give 1 units.   For  - 249 give 2 units.   For  - 274 give 3 units.   For  - 299 give 4 units.   For  - 324 give 5 units.   For  - 349 give 6 units.   For BG greater than or equal to 350 give 7 units.   Notify provider if glucose greater than or equal to 350 mg/dL after administration of correction dose. If given at mealtime, administer within 30 minutes of start of meal  insulin glargine (LANTUS PEN) 100 UNIT/ML pen, Inject 32 Units Subcutaneous every evening Inject 32 units subcutaneous daily.  magnesium oxide (MAG-OX) 400 (241.3 Mg) MG tablet, Take 1 tablet (400 mg) by mouth 2 times daily  mycophenolate (GENERIC EQUIVALENT) 500 MG tablet, Take 2 tablets (1,000 mg) by mouth 2 times daily  pantoprazole (PROTONIX) 40 MG EC tablet, TAKE ONE TABLET BY MOUTH EVERY MORNING BEFORE BREAKFAST  predniSONE (DELTASONE) 10 MG tablet, Take 0.5 tablets (5 mg) by mouth daily  rosuvastatin  "(CRESTOR) 10 MG tablet, TAKE ONE TABLET BY MOUTH ONCE DAILY  tacrolimus (GENERIC EQUIVALENT) 1 MG capsule, Take 3 capsules (3 mg) by mouth 2 times daily Take 3mg BID  valGANciclovir (VALCYTE) 450 MG tablet, TAKE ONE TABLET (450MG) BY MOUTH DAILY    [COMPLETED] lidocaine (LMX4) cream      ROS:  See HPI    EXAM:    /75 (BP Location: Right arm, Patient Position: Chair, Cuff Size: Adult Regular)   Pulse 127   Ht 1.626 m (5' 4\")   Wt 82.6 kg (182 lb)   SpO2 97%   BMI 31.24 kg/m      General: appears comfortable, alert and articulate  Head: normocephalic, atraumatic  Eyes: anicteric sclera, EOMI  Neck: no adenopathy  Orophyarynx: moist mucosa, no lesions, dentition intact  Heart: regular, tachycardic, S1/S2, no murmur, gallop, rub, estimated JVP 8 cm  Lungs: clear, no rales or wheezing  Abdomen: soft, non-tender, bowel sounds present, no hepatosplenomegaly  Extremities: no clubbing, cyanosis or edema  Neurological: normal speech and affect, no gross motor deficits    Labs - reviewed with patient in clinic today:  CBC RESULTS:  Lab Results   Component Value Date    WBC Canceled, Test credited 08/26/2020    RBC Canceled, Test credited 08/26/2020    HGB Canceled, Test credited 08/26/2020    HCT Canceled, Test credited 08/26/2020    MCV Canceled, Test credited 08/26/2020    MCH Canceled, Test credited 08/26/2020    MCHC Canceled, Test credited 08/26/2020    RDW Canceled, Test credited 08/26/2020    PLT Canceled, Test credited 08/26/2020       CMP RESULTS:  Lab Results   Component Value Date     08/26/2020    POTASSIUM 4.1 08/26/2020    CHLORIDE 105 08/26/2020    CO2 25 08/26/2020    ANIONGAP 7 08/26/2020     (H) 08/26/2020    BUN 41 (H) 08/26/2020    CR 1.91 (H) 08/26/2020    GFRESTIMATED 38 (L) 08/26/2020    GFRESTBLACK 44 (L) 08/26/2020    ARLENE 9.3 08/26/2020    BILITOTAL 0.5 08/26/2020    ALBUMIN 3.1 (L) 08/26/2020    ALKPHOS 73 08/26/2020    ALT 19 08/26/2020    AST 20 08/26/2020        INR " RESULTS:  Lab Results   Component Value Date    INR 1.06 08/26/2020       LIPID RESULTS:  Lab Results   Component Value Date    CHOL 183 06/15/2020    HDL 86 06/15/2020    LDL 82 06/15/2020    TRIG 73 06/15/2020       IMMUNOSUPPRESSANT LEVELS:  Lab Results   Component Value Date    TACROL 12.8 08/26/2020    DOSTAC Not Provided 08/26/2020       No components found for: CK  Lab Results   Component Value Date    MAG 1.7 08/26/2020     Lab Results   Component Value Date    A1C 7.4 (H) 08/19/2020     Lab Results   Component Value Date    PHOS 3.0 08/26/2020     Lab Results   Component Value Date    NTBNP 404 (H) 06/26/2019     Lab Results   Component Value Date    SAITESTMET [facility] 08/13/2020    SAICELL Class I 08/13/2020    [id number] Cw:15 08/13/2020    WC2UIRBCAC Cw:18 08/13/2020    SAIREPCOM  08/13/2020      Test performed by modified procedure. Serum heat inactivated and tested   by a modified ([place]) protocol including fetal calf serum addition.   High-risk, mfi >3,000. Mod-risk, mfi 500-3,000.       Lab Results   Component Value Date    SAIITESTME [facility] 08/13/2020    SAIICELL Class II 08/13/2020    [id number] None 08/13/2020    [id number] None 08/13/2020    SAIIREPCOM  08/13/2020      Test performed by modified procedure. Serum heat inactivated and tested   by a modified ([place]) protocol including fetal calf serum addition.   High-risk, mfi >3,000. Mod-risk, mfi 500-3,000.       Lab Results   Component Value Date    CSPEC Plasma 08/19/2020       Diagnostic Studies:  TTE 6/15/20  Global and regional left ventricular function is normal with an EF of 60-65%.  Right ventricular function, chamber size, wall motion, and thickness are normal.  The inferior vena cava is normal.  No pericardial effusion is present.    RHC 7/29/20  RA 5  PA 18/12/15  PCWP 12  TD 4.6/2.5    Assessment/Plan:  Mr. Sharp is a 59 year old male who presents today after biopsy for routine heart transplant follow-up.    As you know, patient had a  history of severe heart failure with reduced ejection fraction secondary to ischemic cardiomyopathy who had underwent HeartMate 3 LVAD placement in 2018, complicated by monomorphic VT with ICD shocks, as well as chronic kidney disease paroxysmal atrial fibrillation and elevated prostate serum antigen.  He underwent heart transplant May 18, 2020.  Since his transplantation he has had normal graft function, with largely normal right heart catheterizations (occasionally may have elevated wedge pressures), but with normal outputs and without DSAs.  His biopsies have remained negative for significant rejection.  His postop course was complicated by some nonsustained VT, with some leukocytosis thought to be due to c acnes growing from his former LVAD site.  This was thought to be a possible contaminant but it was decided to still treat him with antibiotics for a full course.  His donor blood also grew  S anginosus and Veillonella, and this tube was also thought to be a contaminant but was also treated with a full course of antibiotics.    # Status post OHT on 5/18/20, history of ICM  # Chronic immunosuppression  * Rejection history: none.   * Recent immunosuppression changes: MMF decrease while in hospital  * Last biopsy: 8/26, pending  * Intolerance to medications: none     Graft function:  - Fluid status: RHC normal  - BP: at goal  - HRs: > 80     Immunosuppression:  - Received Simulect 5/18 and 5/22  - MMF 1000mg bid, reduced due to infection, leukopenia, and low immunknow  - tacrolimus goal level 10-12, just above goal today.  Will maintain tac at 3/3, and with coming off clotrimazole sushant, level should come down on own  - prednisone per taper, 5mg currently, can stop pending biopsy result     PPx:  - CAV: aspirin 81mg daily, crestor 10mg daily  - PCP: Bactrim stopped 5/27 due to hyperkalemia, pentamidine given 6/30, and 7/28, no further ppx needed since steroids are stopping  - CMV: Valcyte 450mg daily (renally  dosed), x6 months (through 11/2020)  - Thrush: Nystatin QID  - Osteoporosis: calcium/vitamin D   - GI: pantoprazole 40mg daily     Serostatus: CMV D+/R-  EBV D-/R+ Toxo D-/R-     #Hypertension  At goal on amlodipine 10 mg daily and hydralazine 25 mg tid. Continue to monitor as outpatient.     #Hypomagnesium   400 mg bid supplement.     #DM Type II  Following with endocrinology      #Elevated PSA  Prostate MRI showed signs of BPH. He has been referred to urology. Diagnosed with chronic prostatitis per ID and is on prolonged course of oral cefdinir, will recheck PSA level    Follow up in 1 month with labs in 1 week (tac level, BMP, CBC)     Miriam Pascal MD PGY7  Advanced Heart Failure Cardiology Fellow    Seen and discussed with Dr. Ortiz      I have reviewed today's vital signs, notes, medications, labs and imaging. I have also seen and examined the patient and agree with the findings and plan as outlined above.    Jenni Ortiz MD  Section Head - Advanced Heart Failure, Transplantation and Mechanical Circulatory Support  Director - Adult Congenital and Cardiovascular [facility]etics [facility]  Associate Professor of Medicine, [employer] [facility]    Please do not hesitate to contact me if you have any questions/concerns.     Sincerely,     Jenni Ortiz MD

## 2020-08-26 NOTE — NURSING NOTE
Transplant Coordinator Note    Reason for visit: 3 month visit  Coordinator: Present   Caregiver:  none present    Health concerns addressed today:  1. Still feeling run down from Rhinovirus- cough  2.   3.       Immunosuppressants:  Tacrolimus 3/3   MMF 1000 BID  Prednisone 5mg daily        Additional Notes:       Labs/medication reviewed with patient  Medication record reviewed and reconciled  Questions and concerns addressed  Pt verbalized an understanding of plan of care.     Patient Instructions  1. Recheck Tacrolimus level next week.  2. In one week have CBC, BMP, and PSA drawn.  2. If your biopsy is negative- you can stop your prednisone.  3. Stop the Clotimazole trouches now.    Next transplant clinic appointment:  In one month  Next lab draw: next week at [place] [facility]  Coordinator will call with all pending results.     Please call transplant coordinator with any questions:    Angelika Muller RN BSN   Post Heart Transplant Nurse Coordinator  [facility]  Questions: [phone]

## 2020-08-26 NOTE — PROGRESS NOTES
ADULT HEART TRANSPLANT     HPI:   Mr. Sharp is a 59 year old male who presents today after biopsy for routine heart transplant follow-up.      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  He underwent heart transplant May 18, 2020.  Since his transplantation he has had normal graft function, with largely normal right heart catheterizations (occasionally may have elevated wedge pressures), but with normal outputs and without DSAs.  His biopsies have remained negative for significant rejection.  His postop course was complicated by some nonsustained VT, with some leukocytosis thought to be due to c acnes growing from his former LVAD site.  This was thought to be a possible contaminant but it was decided to still treat him with antibiotics for a full course.  His donor blood also grew  S anginosus and Veillonella, and this tube was also thought to be a contaminant but was also treated with a full course of antibiotics.    Unfortunately, patient was hospitalized last week with fevers, later found to have rhinovirus and potentially chronic prostatitis.  He was started on oral cefdinir per transplant ID recommendations.  Feels that his cough and fevers have improved.   He otherwise says he is doing quite well.  Review of systems negative as noted below.   Denies orthopnea, PND, abdominal bloating. Patient denies oral lesions, rashes, lightheadedness, dizziness, headaches, chest pain, palpitations, nausea, vomiting, diarrhea.      PAST MEDICAL HISTORY:  Past Medical History:   Diagnosis Date     Acute kidney injury (H)      CKD (chronic kidney disease)      Coronary artery disease      Diabetes mellitus (H)      HTN (hypertension)       Hyperlipidemia      ICD (implantable cardioverter-defibrillator), single chamber- NOT dependent 7/17/2018     Ischemic cardiomyopathy      LV (left ventricular) mural thrombus      Paroxysmal atrial flutter (H)      RVF (right ventricular failure) (H)      Systolic heart failure (H)      Ventricular tachycardia (H)      CURRENT MEDICATIONS:  ACCU-CHEK [place] PLUS test strip, Check BG 3 times daily at meals and at bedtime.  acetaminophen (TYLENOL) 325 MG tablet, Take 2 tablets (650 mg) by mouth every 4 hours as needed for other (multimodal surgical pain management along with NSAIDS and opioid medication as indicated based on pain control and physical function.)  amLODIPine (NORVASC) 5 MG tablet, Take 2 tablets (10 mg) by mouth At Bedtime  aspirin (ASA) 81 MG chewable tablet, Take 1 tablet (81 mg) by mouth daily  BD SILVANA U/F 32G X 4 MM insulin pen needle, Use 3-4 daily.  calcium carbonate 600 mg-vitamin D 400 units (CALTRATE) 600-400 MG-UNIT per tablet, Take 1 tablet by mouth 2 times daily (with meals)  cefdinir (OMNICEF) 300 MG capsule, Take 1 capsule (300 mg) by mouth 2 times daily  clotrimazole 10 MG sushant, Take 1 Sushant (10 mg) by mouth 4 times daily  Continuous Blood Gluc  (DEXCOM G6 ) DON, 1 each daily  Continuous Blood Gluc Sensor (DEXCOM G6 SENSOR) MISC, 1 each every 10 days  Continuous Blood Gluc Transmit (DEXCOM G6 TRANSMITTER) MISC, 1 each every 3 months  hydrALAZINE (APRESOLINE) 25 MG tablet, Take 1 tablet (25 mg) by mouth 3 times daily  insulin aspart (NOVOLOG PEN) 100 UNIT/ML pen, 1 unit / 8  gms CHO with meals,plus correction insulin. Pt uses approx 40 units in 24 hrs.  insulin aspart (NOVOLOG PEN) 100 UNIT/ML pen, Correction Scale - HIGH INSULIN RESISTANCE DOSING     Do Not give Correction Insulin if Pre-Meal BG less than 140.   For Pre-Meal  - 164 give 1 unit.   For Pre-Meal  - 189 give 2 units.   For Pre-Meal  - 214 give 3 units.   For Pre-Meal  - 239 give 4  units.   For Pre-Meal  - 264 give 5 units.   For Pre-Meal  - 289 give 6 units.   For Pre-Meal  - 314 give 7 units.   For Pre-Meal  - 339 give 8 units.   For Pre-Meal  - 364 give 9 units.   For Pre-Meal BG greater than or equal to 365 give 10 units  To be given with prandial insulin, and based on pre-meal blood glucose.   Notify provider if glucose greater than or equal to 350 mg/dL after administration of correction dose. If given at mealtime, administer within 30 minutes of start of meal  insulin aspart (NOVOLOG PEN) 100 UNIT/ML pen, Inject 1-7 Units Subcutaneous At Bedtime HIGH INSULIN RESISTANCE DOSING    Do Not give Bedtime Correction Insulin if BG less than 200.   For  - 224 give 1 units.   For  - 249 give 2 units.   For  - 274 give 3 units.   For  - 299 give 4 units.   For  - 324 give 5 units.   For  - 349 give 6 units.   For BG greater than or equal to 350 give 7 units.   Notify provider if glucose greater than or equal to 350 mg/dL after administration of correction dose. If given at mealtime, administer within 30 minutes of start of meal  insulin glargine (LANTUS PEN) 100 UNIT/ML pen, Inject 32 Units Subcutaneous every evening Inject 32 units subcutaneous daily.  magnesium oxide (MAG-OX) 400 (241.3 Mg) MG tablet, Take 1 tablet (400 mg) by mouth 2 times daily  mycophenolate (GENERIC EQUIVALENT) 500 MG tablet, Take 2 tablets (1,000 mg) by mouth 2 times daily  pantoprazole (PROTONIX) 40 MG EC tablet, TAKE ONE TABLET BY MOUTH EVERY MORNING BEFORE BREAKFAST  predniSONE (DELTASONE) 10 MG tablet, Take 0.5 tablets (5 mg) by mouth daily  rosuvastatin (CRESTOR) 10 MG tablet, TAKE ONE TABLET BY MOUTH ONCE DAILY  tacrolimus (GENERIC EQUIVALENT) 1 MG capsule, Take 3 capsules (3 mg) by mouth 2 times daily Take 3mg BID  valGANciclovir (VALCYTE) 450 MG tablet, TAKE ONE TABLET (450MG) BY MOUTH DAILY    [COMPLETED] lidocaine (LMX4) cream      ROS:  See  "HPI    EXAM:    /75 (BP Location: Right arm, Patient Position: Chair, Cuff Size: Adult Regular)   Pulse 127   Ht 1.626 m (5' 4\")   Wt 82.6 kg (182 lb)   SpO2 97%   BMI 31.24 kg/m      General: appears comfortable, alert and articulate  Head: normocephalic, atraumatic  Eyes: anicteric sclera, EOMI  Neck: no adenopathy  Orophyarynx: moist mucosa, no lesions, dentition intact  Heart: regular, tachycardic, S1/S2, no murmur, gallop, rub, estimated JVP 8 cm  Lungs: clear, no rales or wheezing  Abdomen: soft, non-tender, bowel sounds present, no hepatosplenomegaly  Extremities: no clubbing, cyanosis or edema  Neurological: normal speech and affect, no gross motor deficits    Labs - reviewed with patient in clinic today:  CBC RESULTS:  Lab Results   Component Value Date    WBC Canceled, Test credited 08/26/2020    RBC Canceled, Test credited 08/26/2020    HGB Canceled, Test credited 08/26/2020    HCT Canceled, Test credited 08/26/2020    MCV Canceled, Test credited 08/26/2020    MCH Canceled, Test credited 08/26/2020    MCHC Canceled, Test credited 08/26/2020    RDW Canceled, Test credited 08/26/2020    PLT Canceled, Test credited 08/26/2020       CMP RESULTS:  Lab Results   Component Value Date     08/26/2020    POTASSIUM 4.1 08/26/2020    CHLORIDE 105 08/26/2020    CO2 25 08/26/2020    ANIONGAP 7 08/26/2020     (H) 08/26/2020    BUN 41 (H) 08/26/2020    CR 1.91 (H) 08/26/2020    GFRESTIMATED 38 (L) 08/26/2020    GFRESTBLACK 44 (L) 08/26/2020    ARLENE 9.3 08/26/2020    BILITOTAL 0.5 08/26/2020    ALBUMIN 3.1 (L) 08/26/2020    ALKPHOS 73 08/26/2020    ALT 19 08/26/2020    AST 20 08/26/2020        INR RESULTS:  Lab Results   Component Value Date    INR 1.06 08/26/2020       LIPID RESULTS:  Lab Results   Component Value Date    CHOL 183 06/15/2020    HDL 86 06/15/2020    LDL 82 06/15/2020    TRIG 73 06/15/2020       IMMUNOSUPPRESSANT LEVELS:  Lab Results   Component Value Date    TACROL 12.8 08/26/2020 "    DOSTAC Not Provided 08/26/2020       No components found for: CK  Lab Results   Component Value Date    MAG 1.7 08/26/2020     Lab Results   Component Value Date    A1C 7.4 (H) 08/19/2020     Lab Results   Component Value Date    PHOS 3.0 08/26/2020     Lab Results   Component Value Date    NTBNP 404 (H) 06/26/2019     Lab Results   Component Value Date    SAITESTMET [facility] 08/13/2020    SAICELL Class I 08/13/2020    [id number] Cw:15 08/13/2020    [id number] Cw:18 08/13/2020    SAIREPCOM  08/13/2020      Test performed by modified procedure. Serum heat inactivated and tested   by a modified ([place]) protocol including fetal calf serum addition.   High-risk, mfi >3,000. Mod-risk, mfi 500-3,000.       Lab Results   Component Value Date    SAIITESTME [facility] 08/13/2020    SAIICELL Class II 08/13/2020    [id number] None 08/13/2020    [id number] None 08/13/2020    SAIIREPCOM  08/13/2020      Test performed by modified procedure. Serum heat inactivated and tested   by a modified ([place]) protocol including fetal calf serum addition.   High-risk, mfi >3,000. Mod-risk, mfi 500-3,000.       Lab Results   Component Value Date    CSPEC Plasma 08/19/2020       Diagnostic Studies:  TTE 6/15/20  Global and regional left ventricular function is normal with an EF of 60-65%.  Right ventricular function, chamber size, wall motion, and thickness are normal.  The inferior vena cava is normal.  No pericardial effusion is present.    RHC 7/29/20  RA 5  PA 18/12/15  PCWP 12  TD 4.6/2.5    Assessment/Plan:  Mr. Sharp is a 59 year old male who presents today after biopsy for routine heart transplant follow-up.    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  He  underwent heart transplant May 18, 2020.  Since his transplantation he has had normal graft function, with largely normal right heart catheterizations (occasionally may have elevated wedge pressures), but with normal outputs and without DSAs.  His biopsies have remained negative for significant rejection.  His postop course was complicated by some nonsustained VT, with some leukocytosis thought to be due to c acnes growing from his former LVAD site.  This was thought to be a possible contaminant but it was decided to still treat him with antibiotics for a full course.  His donor blood also grew  S anginosus and Veillonella, and this tube was also thought to be a contaminant but was also treated with a full course of antibiotics.    # Status post OHT on 5/18/20, history of ICM  # Chronic immunosuppression  * Rejection history: none.   * Recent immunosuppression changes: MMF decrease while in hospital  * Last biopsy: 8/26, pending  * Intolerance to medications: none     Graft function:  - Fluid status: RHC normal  - BP: at goal  - HRs: > 80     Immunosuppression:  - Received Simulect 5/18 and 5/22  - MMF 1000mg bid, reduced due to infection, leukopenia, and low immunknow  - tacrolimus goal level 10-12, just above goal today.  Will maintain tac at 3/3, and with coming off clotrimazole sushant, level should come down on own  - prednisone per taper, 5mg currently, can stop pending biopsy result     PPx:  - CAV: aspirin 81mg daily, crestor 10mg daily  - PCP: Bactrim stopped 5/27 due to hyperkalemia, pentamidine given 6/30, and 7/28, no further ppx needed since steroids are stopping  - CMV: Valcyte 450mg daily (renally dosed), x6 months (through 11/2020)  - Thrush: Nystatin QID  - Osteoporosis: calcium/vitamin D   - GI: pantoprazole 40mg daily     Serostatus: CMV D+/R-  EBV D-/R+ Toxo D-/R-     #Hypertension  At goal on amlodipine 10 mg daily and hydralazine 25 mg tid. Continue to monitor as outpatient.     #Hypomagnesium    400 mg bid supplement.     #DM Type II  Following with endocrinology      #Elevated PSA  Prostate MRI showed signs of BPH. He has been referred to urology. Diagnosed with chronic prostatitis per ID and is on prolonged course of oral cefdinir, will recheck PSA level    Follow up in 1 month with labs in 1 week (tac level, BMP, CBC)     Miriam Pascal MD PGY7  Advanced Heart Failure Cardiology Fellow    Seen and discussed with Dr. Ortiz      I have reviewed today's vital signs, notes, medications, labs and imaging. I have also seen and examined the patient and agree with the findings and plan as outlined above.    Jenni Ortiz MD  Section Head - Advanced Heart Failure, Transplantation and Mechanical Circulatory Support  Director - [facility]  Associate Professor of Medicine, [facility]

## 2020-08-27 ENCOUNTER — TELEPHONE (OUTPATIENT)
Dept: TRANSPLANT | Facility: CLINIC | Age: 60
End: 2020-08-27

## 2020-08-27 LAB — COPATH REPORT: NORMAL

## 2020-08-27 NOTE — TELEPHONE ENCOUNTER
Pt called with negative biopsy results.  Pt may discontinue Prednisone, protonix, and pentamadine nebs.  Pt to have labs checked locally next week.  Pt states understanding instructions and plan of care.

## 2020-08-28 ENCOUNTER — VIRTUAL VISIT (OUTPATIENT)
Dept: UROLOGY | Facility: CLINIC | Age: 60
End: 2020-08-28
Payer: COMMERCIAL

## 2020-08-28 ENCOUNTER — TELEPHONE (OUTPATIENT)
Dept: PHARMACY | Facility: CLINIC | Age: 60
End: 2020-08-28

## 2020-08-28 DIAGNOSIS — N20.0 KIDNEY STONE: ICD-10-CM

## 2020-08-28 NOTE — LETTER
"8/28/2020       RE: Mariusz Sharp  [address] [facility] [address]     Dear Colleague,    Thank you for referring your patient, Mariusz Sharp, to the [facility] UROLOGY AND INST FOR PROSTATE AND UROLOGIC CANCERS at [facility]. Please see a copy of my visit note below.    Mariusz Sharp is a 59 year old male who is being evaluated via a billable telephone visit.      Please Call # [phone]      The patient has been notified of following:     \"This telephone visit will be conducted via a call between you and your physician/provider. We have found that certain health care needs can be provided without the need for a physical exam.  This service lets us provide the care you need with a short phone conversation.  If a prescription is necessary we can send it directly to your pharmacy.  If lab work is needed we can place an order for that and you can then stop by our lab to have the test done at a later time.    Telephone visits are billed at different rates depending on your insurance coverage. During this emergency period, for some insurers they may be billed the same as an in-person visit.  Please reach out to your insurance provider with any questions.    If during the course of the call the physician/provider feels a telephone visit is not appropriate, you will not be charged for this service.\"    Patient has given verbal consent for Telephone visit?  Yes 11:36 AM 08/28/20     How would you like to obtain your AVS? Lucius    Additional provider notes:   Phone call duration: 10 minutes    Kenton Carlos MD        ASSESSMENT and PLAN  Left kidney stone 3cm complicated by recent heart transplant and rhinovirus.    Given his current poor health due to the rhinovirus, we will plan for another virtual visit in 3-6 months.  _________________________________________________________________    CHIEF COMPLAINT   It was my pleasure to see Mariusz Sharp who is a 59 year old " "male for follow-up of kidney stone .      HPI  Mr. Sharp had a heart transplant 5/18/20.  He has had rhinovirus and has been pretty laid up due to this.  He feels \"like he has been hit by a [employer] truck\"    He denies any problems from the kidney stone.    Patient Active Problem List    Diagnosis Date Noted     Rhinovirus infection 08/20/2020     Priority: Medium     Fever in adult 08/19/2020     Priority: Medium     Prophylactic antibiotic 08/19/2020     Priority: Medium     Pneumonia 08/18/2020     Priority: Medium     Heart replaced by transplant (H) 05/26/2020     Priority: Medium     Added automatically from request for surgery [id number]       Biventricular heart failure (H) 05/17/2020     Priority: Medium     Added automatically from request for surgery [id number]       Status post heart transplantation (H) 05/17/2020     Priority: Medium     Added automatically from request for surgery [id number]       Hydronephrosis 05/12/2020     Priority: Medium     Chronic systolic congestive heart failure (H) 02/13/2019     Priority: Medium     Added automatically from request for surgery [id number]       Weakness 01/11/2019     Priority: Medium     Type 2 diabetes mellitus with hyperglycemia, with long-term current use of insulin (H) 07/20/2018     Priority: Medium     Left ventricular apical thrombus following MI (H) 07/06/2018     Priority: Medium     Hematuria 07/04/2018     Priority: Medium     Long term current use of anticoagulant 07/03/2018     Priority: Medium     Hypotension, unspecified hypotension type 07/02/2018     Priority: Medium     Monomorphic ventricular tachycardia (H) 07/02/2018     Priority: Medium     Overview:   Added automatically from request for surgery [id number]       ASCVD (arteriosclerotic cardiovascular disease) 06/27/2018     Priority: Medium     CKD (chronic kidney disease) stage 3, GFR 30-59 ml/min (H) 06/27/2018     Priority: Medium     Dyslipidemia 06/27/2018     Priority: Medium     ST elevation " myocardial infarction involving left anterior descending (LAD) coronary artery (H) 06/27/2018     Priority: Medium     Type 2 diabetes mellitus with hyperglycemia (H) 06/27/2018     Priority: Medium     ANDREW (acute kidney injury) (H) 10/06/2016     Priority: Medium     Ureteral obstruction 10/06/2016     Priority: Medium     Unilateral inguinal hernia 09/09/2009     Priority: Medium     Overview:   IMO Update 10/11       Past Medical History:   Diagnosis Date     Acute kidney injury (H)      CKD (chronic kidney disease)      Coronary artery disease      Diabetes mellitus (H)      HTN (hypertension)      Hyperlipidemia      ICD (implantable cardioverter-defibrillator), single chamber- NOT dependent 7/17/2018     Ischemic cardiomyopathy      LV (left ventricular) mural thrombus      Paroxysmal atrial flutter (H)      RVF (right ventricular failure) (H)      Systolic heart failure (H)      Ventricular tachycardia (H)      Past Surgical History:   Procedure Laterality Date     COLONOSCOPY N/A 12/7/2018    Procedure: COLONOSCOPY;  Surgeon: Krish Mercado MD;  [facility]: [facility]     CV HEART BIOPSY N/A 5/24/2020    Procedure: Heart Biopsy;  Surgeon: Kit Bacon MD;  [facility] HEART CARD[facility]AC [facility]ATH LAB     CV HEART BIOPSY N/A 6/1/2020    Procedure: CV HEART BIOPSY;  Surgeon: Kit Bacon MD;  [facility]     CV HEART BIOPSY N/A 6/8/2020    Procedure: CV HEART BIOPSY;  Surgeon: Kit Bacon MD;  [facility]     CV HEART BIOPSY N/A 6/15/2020    Procedure: CV HEART BIOPSY;  Surgeon: Kit Bacon MD;  [facility]     CV HEART BIOPSY N/A 6/30/2020    Procedure: CV HEART BIOPSY;  Surgeon: Kit Bacon MD;  [facility]     CV HEART BIOPSY N/A 7/14/2020    Procedure: CV HEART BIOPSY;  Surgeon: Brian Long MD;  [facility]     CV HEART BIOPSY N/A 7/29/2020    Procedure: CV HEART BIOPSY;  Surgeon: Momo Hunt MD;  [facility]     CV HEART BIOPSY N/A 8/13/2020    Procedure: CV HEART BIOPSY;  Surgeon: Khris Mcclelland MD;  Location:  HEART CARDIAC CATH LAB     CV RIGHT HEART CATH N/A 2/19/2019    Procedure: RHC;  Surgeon: Brian Long MD;  Location:  HEART CARDIAC CATH LAB     CV RIGHT HEART CATH N/A 7/5/2019    Procedure: CV RIGHT HEART CATH;  Surgeon: Khris Mcclelland MD;  [facility]     CV RIGHT HEART CATH N/A 5/24/2020    Procedure: Right Heart Cath;  Surgeon: Kit Bacon MD;  Location:  HEART CARDIAC CATH LAB     CV RIGHT HEART CATH N/A 6/1/2020    Procedure: CV RIGHT HEART CATH;  Surgeon: Kit Bacon MD;  Location:  HEART CARDIAC CATH LAB     CV RIGHT HEART CATH N/A 6/8/2020    Procedure: CV RIGHT HEART CATH;  Surgeon: Kit Bacon MD;  Location:  HEART CARDIAC CATH LAB     CV RIGHT HEART CATH N/A 6/15/2020    Procedure: CV RIGHT HEART CATH;  Surgeon: Kit Bacon MD;  Location:  HEART CARDIAC CATH LAB     CV RIGHT HEART CATH N/A 6/30/2020    Procedure: CV RIGHT HEART CATH;  Surgeon: Kit Bacon MD;  [facility]     CV RIGHT HEART CATH N/A 7/14/2020    Procedure: CV RIGHT HEART CATH;  Surgeon: Brian Long MD;  Location:  HEART CARDIAC CATH LAB     CV RIGHT HEART CATH N/A 7/29/2020    Procedure: CV RIGHT HEART CATH;  Surgeon: Momo Hunt MD;  Location:  HEART CARDIAC CATH LAB     CV RIGHT HEART CATH N/A 8/13/2020    Procedure: CV RIGHT HEART CATH;  Surgeon: Khris Mcclelland MD;  Location:  HEART CARDIAC CATH LAB     HC PRQ TRLUML CORONARY ANGIOPLASTY ADDL BRANCH      PCI and ALYSSA to ostial LAD on 6/27/18     IMPLANT AUTOMATIC IMPLANTABLE CARDIOVERTER DEFIBRILLATOR       INSERT VENTRICULAR ASSIST  DEVICE LEFT (HEARTMATE II) N/A 12/31/2018    Procedure: Median Sternotomy, On Cardiopulmonary Bypass Pump, Insertion of Left Ventricular Assist Device (Heartmate III);  Surgeon: Kamaljit Baker MD;  Location: [facility] OR     PICC DOUBLE LUMEN PLACEMENT Right 05/22/2020    5FR DL PICC inserted at right basilic vein, length 38cm.     TRANSPLANT HEART RECIPIENT AFTER HEART MATE N/A 5/18/2020    Procedure: REDO MEDIAN STERNOTOMY, LYSIS OF ADHESIONS, ON CARDIOPULMONARY BYPASS PUMP, HEART TRANSPLANT RECIPIENT, REMOVAL OF LEFT VENTRICULAR ASSIST DEVICE, REMOVAL OF AUTOMATED IMPLANTABLE CARDIOVERTER DEFIBRILLATOR;  Surgeon: Elder Willis MD;  Location: [facility] OR     Current Outpatient Medications   Medication Sig Dispense Refill     ACCU-CHEK [place]LOTTE PLUS test strip Check BG 3 times daily at meals and at bedtime. 300 strip 0     acetaminophen (TYLENOL) 325 MG tablet Take 2 tablets (650 mg) by mouth every 4 hours as needed for other (multimodal surgical pain management along with NSAIDS and opioid medication as indicated based on pain control and physical function.)       amLODIPine (NORVASC) 5 MG tablet Take 2 tablets (10 mg) by mouth At Bedtime 90 tablet 3     aspirin (ASA) 81 MG chewable tablet Take 1 tablet (81 mg) by mouth daily 90 tablet 3     BD SILVANA U/F 32G X 4 MM insulin pen needle Use 3-4 daily. 250 each 4     calcium carbonate 600 mg-vitamin D 400 units (CALTRATE) 600-400 MG-UNIT per tablet Take 1 tablet by mouth 2 times daily (with meals)       cefdinir (OMNICEF) 300 MG capsule Take 1 capsule (300 mg) by mouth 2 times daily 84 capsule 0     Continuous Blood Gluc  (DEXCOM G6 ) DON 1 each daily 1 Device 1     Continuous Blood Gluc Sensor (DEXCOM G6 SENSOR) MISC 1 each every 10 days 9 each 3     Continuous Blood Gluc Transmit (DEXCOM G6 TRANSMITTER) MISC 1 each every 3 months 1 each 3     hydrALAZINE (APRESOLINE) 25 MG tablet Take 1 tablet (25 mg) by mouth 3 times daily 270 tablet 3     insulin aspart  (NOVOLOG PEN) 100 UNIT/ML pen 1 unit / 8  gms CHO with meals,plus correction insulin. Pt uses approx 40 units in 24 hrs. 3 mL 3     insulin aspart (NOVOLOG PEN) 100 UNIT/ML pen Correction Scale - HIGH INSULIN RESISTANCE DOSING     Do Not give Correction Insulin if Pre-Meal BG less than 140.   For Pre-Meal  - 164 give 1 unit.   For Pre-Meal  - 189 give 2 units.   For Pre-Meal  - 214 give 3 units.   For Pre-Meal  - 239 give 4 units.   For Pre-Meal  - 264 give 5 units.   For Pre-Meal  - 289 give 6 units.   For Pre-Meal  - 314 give 7 units.   For Pre-Meal  - 339 give 8 units.   For Pre-Meal  - 364 give 9 units.   For Pre-Meal BG greater than or equal to 365 give 10 units  To be given with prandial insulin, and based on pre-meal blood glucose.   Notify provider if glucose greater than or equal to 350 mg/dL after administration of correction dose. If given at mealtime, administer within 30 minutes of start of meal 3 mL 0     insulin aspart (NOVOLOG PEN) 100 UNIT/ML pen Inject 1-7 Units Subcutaneous At Bedtime HIGH INSULIN RESISTANCE DOSING    Do Not give Bedtime Correction Insulin if BG less than 200.   For  - 224 give 1 units.   For  - 249 give 2 units.   For  - 274 give 3 units.   For  - 299 give 4 units.   For  - 324 give 5 units.   For  - 349 give 6 units.   For BG greater than or equal to 350 give 7 units.   Notify provider if glucose greater than or equal to 350 mg/dL after administration of correction dose. If given at mealtime, administer within 30 minutes of start of meal 3 mL 0     insulin glargine (LANTUS PEN) 100 UNIT/ML pen Inject 32 Units Subcutaneous every evening Inject 32 units subcutaneous daily. 15 mL 3     magnesium oxide (MAG-OX) 400 (241.3 Mg) MG tablet Take 1 tablet (400 mg) by mouth 2 times daily 90 tablet 3     mycophenolate (GENERIC EQUIVALENT) 500 MG tablet Take 2 tablets (1,000 mg) by mouth 2 times daily  180 tablet 11     rosuvastatin (CRESTOR) 10 MG tablet TAKE ONE TABLET BY MOUTH ONCE DAILY 90 tablet 3     tacrolimus (GENERIC EQUIVALENT) 1 MG capsule Take 3 capsules (3 mg) by mouth 2 times daily Take 3mg  capsule 11     valGANciclovir (VALCYTE) 450 MG tablet TAKE ONE TABLET (450MG) BY MOUTH DAILY 30 tablet 3      SOCIAL HISTORY: He  reports that he has never smoked. He has never used smokeless tobacco. He reports that he does not drink alcohol or use drugs.    CC  Patient Care Team:  Milad Fry MD as PCP - General (Family Practice)  Antonia Byrne APRN CNP as Nurse Practitioner (Cardiology)  Jenni Ortiz MD as MD (Cardiology)  Megan Ibarra MD as MD (Urology)  Nina Gutierrez, RN as Specialty Care Coordinator (Urology)  Angelika Muller, RN as Transplant Coordinator (Cardiology)  Xavi Mckeon, [facility] as Pharmacist (Pharmacist)  Jeannette Schafer PA-C as Physician Assistant (INTERNAL MEDICINE - ENDOCRINOLOGY, DIABETES & METABOLISM)  SELF, REFERRED    Copy to patient  ADILSON RINCON  [address] [place] [id number]

## 2020-08-28 NOTE — PROGRESS NOTES
"Mariusz Sharp is a 59 year old male who is being evaluated via a billable telephone visit.      Please Call # [phone]      The patient has been notified of following:     \"This telephone visit will be conducted via a call between you and your physician/provider. We have found that certain health care needs can be provided without the need for a physical exam.  This service lets us provide the care you need with a short phone conversation.  If a prescription is necessary we can send it directly to your pharmacy.  If lab work is needed we can place an order for that and you can then stop by our lab to have the test done at a later time.    Telephone visits are billed at different rates depending on your insurance coverage. During this emergency period, for some insurers they may be billed the same as an in-person visit.  Please reach out to your insurance provider with any questions.    If during the course of the call the physician/provider feels a telephone visit is not appropriate, you will not be charged for this service.\"    Patient has given verbal consent for Telephone visit?  Yes 11:36 AM 08/28/20     How would you like to obtain your AVS? Lucius    Additional provider notes:   Phone call duration: 10 minutes    Kenton Carlos MD      "

## 2020-08-28 NOTE — TELEPHONE ENCOUNTER
Clinical Pharmacy Consult:                                                      Transplant Specific: 3 Month Post Transplant Pharmacy Medication Review  Date of Transplant: 5/18/2020  Type of Transplant: heart  First Transplant: yes  History of rejection: no    Immunosuppression Regimen   TAC 3mg qAM & 3mg qPM,  and MMF 1000mg qAM & 1000mg qPM  Patient specific goal: 8-10  Most recent level: 12.5, date 8/26/20  Immunosuppressant Levels:  Supratherapeutic  Pt adherent to lab draws: yes  Scr:   Lab Results   Component Value Date    CR 1.75 06/30/2020     Side effects: none at this time    Prophylactic Medications  Antibacterial:  Inhaled Pentamidine 300mg q 4 weeks  Scheduled Discontinue Date: completed    Antifungal: Clotrimazole   Scheduled Discontinue Date: completed    Antiviral: CrCl 40 to 59 mL/minute: Valcyte 450 mg once daily   Scheduled Discontinue Date: 6 months    Acid Reducer: Protonix (pantoprazole)  Scheduled Reviewed Date: completed    Thrombosis Prevention: Aspirin 81 mg PO daily      Blood Pressure Management  Frequency of home Blood Pressure checks: once daily  Most recent home BP: 125/85   Patient Blood pressure goal: <140/90  Patient blood pressure at goal:  yes  Hospitalizations/ER visits since last assessment: 1 had covid-19 Sx, turned out to be a Rhino virus    Med rec/DUR performed: yes  Med Rec Discrepancies: no    Medication adherence flowsheet 8/28/2020   Patient medication administration: Responsible for own medications   Patient estimated adherence level: %   Pharmacist assessment of adherence: Excellent   Patient reported doses missed per week: 0-1   Pharmacy MPR: -   Facilitators to medication adherence  Cell phone;Medication dosing chart;Pill box   Patient reported barriers to medication adherence  -   Adherence intervention(s): -      Medication access flowsheet 8/28/2020   Number of pharmacies used: 2   Pharmacy: [facility];Other   Other pharmacy: (No Data)   Enrolled in  [facility]? Yes   Patient reported barriers to accessing medications: -   Medication access interventions: -        Red reports feeling good. He has a rhino virus and is relieved it was not covid-19. He reports no side effects at this time. He does not use the med card, he uses Mychart.  He checks his blood pressure once a day. This morning it was 125/85. He knew his meds well.    Shukri Felton [facility]  Specialty Pharmacist [phone]

## 2020-08-28 NOTE — PROGRESS NOTES
"ASSESSMENT and PLAN  Left kidney stone 3cm complicated by recent heart transplant and rhinovirus.    Given his current poor health due to the rhinovirus, we will plan for another virtual visit in 3-6 months.  _________________________________________________________________    CHIEF COMPLAINT   It was my pleasure to see Mariusz Sharp who is a 59 year old male for follow-up of kidney stone .      HPI  Mr. Sharp had a heart transplant 5/18/20.  He has had rhinovirus and has been pretty laid up due to this.  He feels \"like he has been hit by a [employer] truck\"    He denies any problems from the kidney stone.    Patient Active Problem List    Diagnosis Date Noted     Rhinovirus infection 08/20/2020     Priority: Medium     Fever in adult 08/19/2020     Priority: Medium     Prophylactic antibiotic 08/19/2020     Priority: Medium     Pneumonia 08/18/2020     Priority: Medium     Heart replaced by transplant (H) 05/26/2020     Priority: Medium     Added automatically from request for surgery [id number]       Biventricular heart failure (H) 05/17/2020     Priority: Medium     Added automatically from request for surgery [id number]       Status post heart transplantation (H) 05/17/2020     Priority: Medium     Added automatically from request for surgery [id number]       Hydronephrosis 05/12/2020     Priority: Medium     Chronic systolic congestive heart failure (H) 02/13/2019     Priority: Medium     Added automatically from request for surgery [id number]       Weakness 01/11/2019     Priority: Medium     Type 2 diabetes mellitus with hyperglycemia, with long-term current use of insulin (H) 07/20/2018     Priority: Medium     Left ventricular apical thrombus following MI (H) 07/06/2018     Priority: Medium     Hematuria 07/04/2018     Priority: Medium     Long term current use of anticoagulant 07/03/2018     Priority: Medium     Hypotension, unspecified hypotension type 07/02/2018     Priority: Medium     Monomorphic ventricular tachycardia " (H) 07/02/2018     Priority: Medium     Overview:   Added automatically from request for surgery [id number]       ASCVD (arteriosclerotic cardiovascular disease) 06/27/2018     Priority: Medium     CKD (chronic kidney disease) stage 3, GFR 30-59 ml/min (H) 06/27/2018     Priority: Medium     Dyslipidemia 06/27/2018     Priority: Medium     ST elevation myocardial infarction involving left anterior descending (LAD) coronary artery (H) 06/27/2018     Priority: Medium     Type 2 diabetes mellitus with hyperglycemia (H) 06/27/2018     Priority: Medium     ANDREW (acute kidney injury) (H) 10/06/2016     Priority: Medium     Ureteral obstruction 10/06/2016     Priority: Medium     Unilateral inguinal hernia 09/09/2009     Priority: Medium     Overview:   IMO Update 10/11       Past Medical History:   Diagnosis Date     Acute kidney injury (H)      CKD (chronic kidney disease)      Coronary artery disease      Diabetes mellitus (H)      HTN (hypertension)      Hyperlipidemia      ICD (implantable cardioverter-defibrillator), single chamber- NOT dependent 7/17/2018     Ischemic cardiomyopathy      LV (left ventricular) mural thrombus      Paroxysmal atrial flutter (H)      RVF (right ventricular failure) (H)      Systolic heart failure (H)      Ventricular tachycardia (H)      Past Surgical History:   Procedure Laterality Date     COLONOSCOPY N/A 12/7/2018    Procedure: COLONOSCOPY;  Surgeon: Krish Mercado MD;  Location: [facility]     CV HEART BIOPSY N/A 5/24/2020    Procedure: Heart Biopsy;  Surgeon: Kit Bacon MD;  Location: [facility]     CV HEART BIOPSY N/A 6/1/2020    Procedure: CV HEART BIOPSY;  Surgeon: Kit Bacon MD;  Location: [facility]     CV HEART BIOPSY N/A 6/8/2020    Procedure: CV HEART BIOPSY;  Surgeon: Kit Bacon MD;  [facility]     CV HEART BIOPSY N/A 6/15/2020    Procedure: CV HEART BIOPSY;  Surgeon:  Kit Bacon MD;  [facility]     CV HEART BIOPSY N/A 6/30/2020    Procedure: CV HEART BIOPSY;  Surgeon: Kit Bacon MD;  [facility]     CV HEART BIOPSY N/A 7/14/2020    Procedure: CV HEART BIOPSY;  Surgeon: Brian Long MD;  [facility]     CV HEART BIOPSY N/A 7/29/2020    Procedure: CV HEART BIOPSY;  Surgeon: Momo Hunt MD;  [facility]     CV HEART BIOPSY N/A 8/13/2020    Procedure: CV HEART BIOPSY;  Surgeon: Khris Mcclelland MD;  [facility]     CV RIGHT HEART CATH N/A 2/19/2019    Procedure: RHC;  Surgeon: Brian Long MD;  [facility]     CV RIGHT HEART CATH N/A 7/5/2019    Procedure: CV RIGHT HEART CATH;  Surgeon: Khris Mcclelland MD;  [facility]     CV RIGHT HEART CATH N/A 5/24/2020    Procedure: Right Heart Cath;  Surgeon: Kit Bacon MD;  [facility]     CV RIGHT HEART CATH N/A 6/1/2020    Procedure: CV RIGHT HEART CATH;  Surgeon: Kit Bacon MD;  [facility]     CV RIGHT HEART CATH N/A 6/8/2020    Procedure: CV RIGHT HEART CATH;  Surgeon: Kit Bacon MD;  [facility]     CV RIGHT HEART CATH N/A 6/15/2020    Procedure: CV RIGHT HEART CATH;  Surgeon: Kit Bacon MD;  [facility]     CV RIGHT HEART CATH N/A 6/30/2020    Procedure: CV RIGHT HEART CATH;  Surgeon: Kit Bacon MD;  [facility]     CV RIGHT HEART CATH N/A 7/14/2020    Procedure: CV RIGHT HEART CATH;  Surgeon: Brian Long MD;  Location: [facility] CARDIAC CATH LAB     CV RIGHT HEART CATH N/A 7/29/2020    Procedure: CV RIGHT HEART CATH;  Surgeon: Momo Hunt MD;  Location: [facility]  CARDIAC CATH LAB     CV RIGHT HEART CATH N/A 8/13/2020    Procedure: CV RIGHT HEART CATH;  Surgeon: Khris Mcclelland MD;  Location:  HEART CARDIAC CATH LAB     HC PRQ [id number] CORONARY ANGIOPLASTY ADDL BRANCH      PCI and ALYSSA to ostial LAD on 6/27/18     IMPLANT AUTOMATIC IMPLANTABLE CARDIOVERTER DEFIBRILLATOR       INSERT VENTRICULAR ASSIST DEVICE LEFT (HEARTMATE II) N/A 12/31/2018    Procedure: Median Sternotomy, On Cardiopulmonary Bypass Pump, Insertion of Left Ventricular Assist Device (Heartmate III);  Surgeon: Kamaljit Baker MD;  Location: [facility]     PICC DOUBLE LUMEN PLACEMENT Right 05/22/2020    5FR DL PICC inserted at right basilic vein, length 38cm.     TRANSPLANT HEART RECIPIENT AFTER HEART MATE N/A 5/18/2020    Procedure: REDO MEDIAN STERNOTOMY, LYSIS OF ADHESIONS, ON CARDIOPULMONARY BYPASS PUMP, HEART TRANSPLANT RECIPIENT, REMOVAL OF LEFT VENTRICULAR ASSIST DEVICE, REMOVAL OF AUTOMATED IMPLANTABLE CARDIOVERTER DEFIBRILLATOR;  Surgeon: Elder Willis MD;  Location: [facility]     Current Outpatient Medications   Medication Sig Dispense Refill     ACCU-CHEK [place] PLUS test strip Check BG 3 times daily at meals and at bedtime. 300 strip 0     acetaminophen (TYLENOL) 325 MG tablet Take 2 tablets (650 mg) by mouth every 4 hours as needed for other (multimodal surgical pain management along with NSAIDS and opioid medication as indicated based on pain control and physical function.)       amLODIPine (NORVASC) 5 MG tablet Take 2 tablets (10 mg) by mouth At Bedtime 90 tablet 3     aspirin (ASA) 81 MG chewable tablet Take 1 tablet (81 mg) by mouth daily 90 tablet 3     BD SILVANA U/F 32G X 4 MM insulin pen needle Use 3-4 daily. 250 each 4     calcium carbonate 600 mg-vitamin D 400 units (CALTRATE) 600-400 MG-UNIT per tablet Take 1 tablet by mouth 2 times daily (with meals)       cefdinir (OMNICEF) 300 MG capsule Take 1 capsule (300 mg) by mouth 2 times daily 84 capsule 0     Continuous Blood Gluc   (DEXCOM G6 ) DON 1 each daily 1 Device 1     Continuous Blood Gluc Sensor (DEXCOM G6 SENSOR) MISC 1 each every 10 days 9 each 3     Continuous Blood Gluc Transmit (DEXCOM G6 TRANSMITTER) MISC 1 each every 3 months 1 each 3     hydrALAZINE (APRESOLINE) 25 MG tablet Take 1 tablet (25 mg) by mouth 3 times daily 270 tablet 3     insulin aspart (NOVOLOG PEN) 100 UNIT/ML pen 1 unit / 8  gms CHO with meals,plus correction insulin. Pt uses approx 40 units in 24 hrs. 3 mL 3     insulin aspart (NOVOLOG PEN) 100 UNIT/ML pen Correction Scale - HIGH INSULIN RESISTANCE DOSING     Do Not give Correction Insulin if Pre-Meal BG less than 140.   For Pre-Meal  - 164 give 1 unit.   For Pre-Meal  - 189 give 2 units.   For Pre-Meal  - 214 give 3 units.   For Pre-Meal  - 239 give 4 units.   For Pre-Meal  - 264 give 5 units.   For Pre-Meal  - 289 give 6 units.   For Pre-Meal  - 314 give 7 units.   For Pre-Meal  - 339 give 8 units.   For Pre-Meal  - 364 give 9 units.   For Pre-Meal BG greater than or equal to 365 give 10 units  To be given with prandial insulin, and based on pre-meal blood glucose.   Notify provider if glucose greater than or equal to 350 mg/dL after administration of correction dose. If given at mealtime, administer within 30 minutes of start of meal 3 mL 0     insulin aspart (NOVOLOG PEN) 100 UNIT/ML pen Inject 1-7 Units Subcutaneous At Bedtime HIGH INSULIN RESISTANCE DOSING    Do Not give Bedtime Correction Insulin if BG less than 200.   For  - 224 give 1 units.   For  - 249 give 2 units.   For  - 274 give 3 units.   For  - 299 give 4 units.   For  - 324 give 5 units.   For  - 349 give 6 units.   For BG greater than or equal to 350 give 7 units.   Notify provider if glucose greater than or equal to 350 mg/dL after administration of correction dose. If given at mealtime, administer within 30 minutes of start of meal 3 mL 0      insulin glargine (LANTUS PEN) 100 UNIT/ML pen Inject 32 Units Subcutaneous every evening Inject 32 units subcutaneous daily. 15 mL 3     magnesium oxide (MAG-OX) 400 (241.3 Mg) MG tablet Take 1 tablet (400 mg) by mouth 2 times daily 90 tablet 3     mycophenolate (GENERIC EQUIVALENT) 500 MG tablet Take 2 tablets (1,000 mg) by mouth 2 times daily 180 tablet 11     rosuvastatin (CRESTOR) 10 MG tablet TAKE ONE TABLET BY MOUTH ONCE DAILY 90 tablet 3     tacrolimus (GENERIC EQUIVALENT) 1 MG capsule Take 3 capsules (3 mg) by mouth 2 times daily Take 3mg  capsule 11     valGANciclovir (VALCYTE) 450 MG tablet TAKE ONE TABLET (450MG) BY MOUTH DAILY 30 tablet 3      SOCIAL HISTORY: He  reports that he has never smoked. He has never used smokeless tobacco. He reports that he does not drink alcohol or use drugs.    CC  Patient Care Team:  Milad Fry MD as PCP - General (Family Practice)  Antonia Byrne APRN CNP as Nurse Practitioner (Cardiology)  Jenni Ortiz MD as MD (Cardiology)  Megan Ibarra MD as MD (Urology)  Nina Gutiererz RN as Specialty Care Coordinator (Urology)  Angelika Muller, RN as Transplant Coordinator (Cardiology)  Xavi Mckeon [facility] as Pharmacist (Pharmacist)  Jeannette Schafer PA-C as Physician Assistant (INTERNAL MEDICINE - ENDOCRINOLOGY, DIABETES & METABOLISM)  SELF, REFERRED    Copy to patient  ADILSON RINCON  [address] [place] [phone]

## 2020-08-31 ENCOUNTER — APPOINTMENT (OUTPATIENT)
Dept: ULTRASOUND IMAGING | Facility: CLINIC | Age: 60
End: 2020-08-31
Attending: STUDENT IN AN ORGANIZED HEALTH CARE EDUCATION/TRAINING PROGRAM
Payer: COMMERCIAL

## 2020-08-31 ENCOUNTER — APPOINTMENT (OUTPATIENT)
Dept: CARDIOLOGY | Facility: CLINIC | Age: 60
End: 2020-08-31
Attending: STUDENT IN AN ORGANIZED HEALTH CARE EDUCATION/TRAINING PROGRAM
Payer: COMMERCIAL

## 2020-08-31 ENCOUNTER — TELEPHONE (OUTPATIENT)
Dept: TRANSPLANT | Facility: CLINIC | Age: 60
End: 2020-08-31

## 2020-08-31 ENCOUNTER — APPOINTMENT (OUTPATIENT)
Dept: CT IMAGING | Facility: CLINIC | Age: 60
End: 2020-08-31
Attending: EMERGENCY MEDICINE
Payer: COMMERCIAL

## 2020-08-31 ENCOUNTER — HOSPITAL ENCOUNTER (INPATIENT)
Facility: CLINIC | Age: 60
LOS: 24 days | Discharge: HOME | End: 2020-09-24
Attending: EMERGENCY MEDICINE | Admitting: INTERNAL MEDICINE
Payer: COMMERCIAL

## 2020-08-31 ENCOUNTER — APPOINTMENT (OUTPATIENT)
Dept: GENERAL RADIOLOGY | Facility: CLINIC | Age: 60
End: 2020-08-31
Attending: EMERGENCY MEDICINE
Payer: COMMERCIAL

## 2020-08-31 DIAGNOSIS — Z94.1 HEART REPLACED BY TRANSPLANT (H): ICD-10-CM

## 2020-08-31 DIAGNOSIS — R50.9 FEVER AND CHILLS: ICD-10-CM

## 2020-08-31 DIAGNOSIS — R55 SYNCOPE, UNSPECIFIED SYNCOPE TYPE: ICD-10-CM

## 2020-08-31 DIAGNOSIS — R91.8 LUNG MASS: Primary | ICD-10-CM

## 2020-08-31 DIAGNOSIS — R19.7 DIARRHEA, UNSPECIFIED TYPE: ICD-10-CM

## 2020-08-31 DIAGNOSIS — Z20.828 EXPOSURE TO SARS-ASSOCIATED CORONAVIRUS: ICD-10-CM

## 2020-08-31 DIAGNOSIS — E10.9 TYPE 1 DIABETES MELLITUS WITHOUT COMPLICATION (H): ICD-10-CM

## 2020-08-31 DIAGNOSIS — Z94.1 STATUS POST HEART TRANSPLANTATION (H): ICD-10-CM

## 2020-08-31 DIAGNOSIS — Z79.4 ENCOUNTER FOR LONG-TERM (CURRENT) USE OF INSULIN (H): ICD-10-CM

## 2020-08-31 DIAGNOSIS — R50.9 HYPERTHERMIA-INDUCED DEFECT: ICD-10-CM

## 2020-08-31 DIAGNOSIS — R91.8 LUNG MASS: ICD-10-CM

## 2020-08-31 LAB
ALBUMIN SERPL-MCNC: 3.1 G/DL (ref 3.4–5)
ALBUMIN UR-MCNC: 10 MG/DL
ALP SERPL-CCNC: 76 U/L (ref 40–150)
ALT SERPL W P-5'-P-CCNC: 14 U/L (ref 0–70)
ANION GAP SERPL CALCULATED.3IONS-SCNC: 8 MMOL/L (ref 3–14)
ANISOCYTOSIS BLD QL SMEAR: SLIGHT
APPEARANCE UR: CLEAR
AST SERPL W P-5'-P-CCNC: 21 U/L (ref 0–45)
BASOPHILS # BLD AUTO: 0 10E9/L (ref 0–0.2)
BASOPHILS NFR BLD AUTO: 0.9 %
BILIRUB SERPL-MCNC: 0.8 MG/DL (ref 0.2–1.3)
BILIRUB UR QL STRIP: NEGATIVE
BUN SERPL-MCNC: 33 MG/DL (ref 7–30)
CALCIUM SERPL-MCNC: 9.6 MG/DL (ref 8.5–10.1)
CHLORIDE SERPL-SCNC: 100 MMOL/L (ref 94–109)
CO2 SERPL-SCNC: 22 MMOL/L (ref 20–32)
COLOR UR AUTO: ABNORMAL
CREAT SERPL-MCNC: 1.6 MG/DL (ref 0.66–1.25)
DIFFERENTIAL METHOD BLD: ABNORMAL
EOSINOPHIL # BLD AUTO: 0.1 10E9/L (ref 0–0.7)
EOSINOPHIL NFR BLD AUTO: 1.8 %
ERYTHROCYTE [DISTWIDTH] IN BLOOD BY AUTOMATED COUNT: 14.6 % (ref 10–15)
GFR SERPL CREATININE-BSD FRML MDRD: 47 ML/MIN/{1.73_M2}
GLUCOSE BLDC GLUCOMTR-MCNC: 156 MG/DL (ref 70–99)
GLUCOSE BLDC GLUCOMTR-MCNC: 173 MG/DL (ref 70–99)
GLUCOSE SERPL-MCNC: 186 MG/DL (ref 70–99)
GLUCOSE UR STRIP-MCNC: 300 MG/DL
HCT VFR BLD AUTO: 31.3 % (ref 40–53)
HGB BLD-MCNC: 9.7 G/DL (ref 13.3–17.7)
HGB UR QL STRIP: ABNORMAL
KETONES UR STRIP-MCNC: 10 MG/DL
LABORATORY COMMENT REPORT: NORMAL
LACTATE BLD-SCNC: 1.5 MMOL/L (ref 0.7–2)
LEUKOCYTE ESTERASE UR QL STRIP: NEGATIVE
LYMPHOCYTES # BLD AUTO: 0.4 10E9/L (ref 0.8–5.3)
LYMPHOCYTES NFR BLD AUTO: 8.8 %
MCH RBC QN AUTO: 27.7 PG (ref 26.5–33)
MCHC RBC AUTO-ENTMCNC: 31 G/DL (ref 31.5–36.5)
MCV RBC AUTO: 89 FL (ref 78–100)
MICROCYTES BLD QL SMEAR: PRESENT
MONOCYTES # BLD AUTO: 0.2 10E9/L (ref 0–1.3)
MONOCYTES NFR BLD AUTO: 5.3 %
NEUTROPHILS # BLD AUTO: 3.5 10E9/L (ref 1.6–8.3)
NEUTROPHILS NFR BLD AUTO: 83.2 %
NITRATE UR QL: NEGATIVE
PH UR STRIP: 5.5 PH (ref 5–7)
PLATELET # BLD AUTO: 374 10E9/L (ref 150–450)
PLATELET # BLD EST: ABNORMAL 10*3/UL
POIKILOCYTOSIS BLD QL SMEAR: SLIGHT
POTASSIUM SERPL-SCNC: 4 MMOL/L (ref 3.4–5.3)
PROCALCITONIN SERPL-MCNC: 0.23 NG/ML
PROT SERPL-MCNC: 7.3 G/DL (ref 6.8–8.8)
RBC # BLD AUTO: 3.5 10E12/L (ref 4.4–5.9)
RBC #/AREA URNS AUTO: 9 /HPF (ref 0–2)
SARS-COV-2 RNA SPEC QL NAA+PROBE: NEGATIVE
SARS-COV-2 RNA SPEC QL NAA+PROBE: NORMAL
SODIUM SERPL-SCNC: 130 MMOL/L (ref 133–144)
SOURCE: ABNORMAL
SP GR UR STRIP: 1.01 (ref 1–1.03)
SPECIMEN SOURCE: NORMAL
SPECIMEN SOURCE: NORMAL
UROBILINOGEN UR STRIP-MCNC: NORMAL MG/DL (ref 0–2)
WBC # BLD AUTO: 4.2 10E9/L (ref 4–11)
WBC #/AREA URNS AUTO: 1 /HPF (ref 0–5)

## 2020-08-31 PROCEDURE — 96361 HYDRATE IV INFUSION ADD-ON: CPT | Performed by: EMERGENCY MEDICINE

## 2020-08-31 PROCEDURE — 25000128 H RX IP 250 OP 636: Performed by: INTERNAL MEDICINE

## 2020-08-31 PROCEDURE — 96367 TX/PROPH/DG ADDL SEQ IV INF: CPT | Performed by: EMERGENCY MEDICINE

## 2020-08-31 PROCEDURE — 93321 DOPPLER ECHO F-UP/LMTD STD: CPT | Mod: 26 | Performed by: INTERNAL MEDICINE

## 2020-08-31 PROCEDURE — 93308 TTE F-UP OR LMTD: CPT | Mod: 26 | Performed by: INTERNAL MEDICINE

## 2020-08-31 PROCEDURE — 93325 DOPPLER ECHO COLOR FLOW MAPG: CPT | Mod: 26 | Performed by: INTERNAL MEDICINE

## 2020-08-31 PROCEDURE — 84145 PROCALCITONIN (PCT): CPT | Performed by: EMERGENCY MEDICINE

## 2020-08-31 PROCEDURE — 25000131 ZZH RX MED GY IP 250 OP 636 PS 637: Performed by: STUDENT IN AN ORGANIZED HEALTH CARE EDUCATION/TRAINING PROGRAM

## 2020-08-31 PROCEDURE — 96365 THER/PROPH/DIAG IV INF INIT: CPT | Performed by: EMERGENCY MEDICINE

## 2020-08-31 PROCEDURE — 81001 URINALYSIS AUTO W/SCOPE: CPT | Performed by: EMERGENCY MEDICINE

## 2020-08-31 PROCEDURE — C9803 HOPD COVID-19 SPEC COLLECT: HCPCS | Performed by: EMERGENCY MEDICINE

## 2020-08-31 PROCEDURE — 87205 SMEAR GRAM STAIN: CPT | Performed by: STUDENT IN AN ORGANIZED HEALTH CARE EDUCATION/TRAINING PROGRAM

## 2020-08-31 PROCEDURE — 25000128 H RX IP 250 OP 636: Performed by: STUDENT IN AN ORGANIZED HEALTH CARE EDUCATION/TRAINING PROGRAM

## 2020-08-31 PROCEDURE — 85025 COMPLETE CBC W/AUTO DIFF WBC: CPT | Performed by: EMERGENCY MEDICINE

## 2020-08-31 PROCEDURE — 99285 EMERGENCY DEPT VISIT HI MDM: CPT | Mod: 25 | Performed by: EMERGENCY MEDICINE

## 2020-08-31 PROCEDURE — 87040 BLOOD CULTURE FOR BACTERIA: CPT | Performed by: STUDENT IN AN ORGANIZED HEALTH CARE EDUCATION/TRAINING PROGRAM

## 2020-08-31 PROCEDURE — 96366 THER/PROPH/DIAG IV INF ADDON: CPT | Performed by: EMERGENCY MEDICINE

## 2020-08-31 PROCEDURE — 87449 NOS EACH ORGANISM AG IA: CPT | Performed by: STUDENT IN AN ORGANIZED HEALTH CARE EDUCATION/TRAINING PROGRAM

## 2020-08-31 PROCEDURE — 80053 COMPREHEN METABOLIC PANEL: CPT | Performed by: EMERGENCY MEDICINE

## 2020-08-31 PROCEDURE — 21400000 ZZH R&B CCU UMMC

## 2020-08-31 PROCEDURE — 71045 X-RAY EXAM CHEST 1 VIEW: CPT

## 2020-08-31 PROCEDURE — 87651 STREP A DNA AMP PROBE: CPT | Performed by: STUDENT IN AN ORGANIZED HEALTH CARE EDUCATION/TRAINING PROGRAM

## 2020-08-31 PROCEDURE — 70450 CT HEAD/BRAIN W/O DYE: CPT

## 2020-08-31 PROCEDURE — 87305 ASPERGILLUS AG IA: CPT | Performed by: STUDENT IN AN ORGANIZED HEALTH CARE EDUCATION/TRAINING PROGRAM

## 2020-08-31 PROCEDURE — 87633 RESP VIRUS 12-25 TARGETS: CPT | Performed by: EMERGENCY MEDICINE

## 2020-08-31 PROCEDURE — 25800030 ZZH RX IP 258 OP 636: Performed by: EMERGENCY MEDICINE

## 2020-08-31 PROCEDURE — 87040 BLOOD CULTURE FOR BACTERIA: CPT | Performed by: EMERGENCY MEDICINE

## 2020-08-31 PROCEDURE — 87486 CHLMYD PNEUM DNA AMP PROBE: CPT | Performed by: EMERGENCY MEDICINE

## 2020-08-31 PROCEDURE — 25000132 ZZH RX MED GY IP 250 OP 250 PS 637: Performed by: STUDENT IN AN ORGANIZED HEALTH CARE EDUCATION/TRAINING PROGRAM

## 2020-08-31 PROCEDURE — 36415 COLL VENOUS BLD VENIPUNCTURE: CPT | Performed by: STUDENT IN AN ORGANIZED HEALTH CARE EDUCATION/TRAINING PROGRAM

## 2020-08-31 PROCEDURE — 83605 ASSAY OF LACTIC ACID: CPT | Performed by: EMERGENCY MEDICINE

## 2020-08-31 PROCEDURE — 76770 US EXAM ABDO BACK WALL COMP: CPT

## 2020-08-31 PROCEDURE — 87581 M.PNEUMON DNA AMP PROBE: CPT | Performed by: EMERGENCY MEDICINE

## 2020-08-31 PROCEDURE — 99285 EMERGENCY DEPT VISIT HI MDM: CPT | Mod: Z6 | Performed by: EMERGENCY MEDICINE

## 2020-08-31 PROCEDURE — 93308 TTE F-UP OR LMTD: CPT

## 2020-08-31 PROCEDURE — 00000146 ZZHCL STATISTIC GLUCOSE BY METER IP

## 2020-08-31 PROCEDURE — 25800030 ZZH RX IP 258 OP 636: Performed by: INTERNAL MEDICINE

## 2020-08-31 PROCEDURE — 87070 CULTURE OTHR SPECIMN AEROBIC: CPT | Performed by: STUDENT IN AN ORGANIZED HEALTH CARE EDUCATION/TRAINING PROGRAM

## 2020-08-31 PROCEDURE — U0003 INFECTIOUS AGENT DETECTION BY NUCLEIC ACID (DNA OR RNA); SEVERE ACUTE RESPIRATORY SYNDROME CORONAVIRUS 2 (SARS-COV-2) (CORONAVIRUS DISEASE [COVID-19]), AMPLIFIED PROBE TECHNIQUE, MAKING USE OF HIGH THROUGHPUT TECHNOLOGIES AS DESCRIBED BY CMS-2020-01-R: HCPCS | Performed by: EMERGENCY MEDICINE

## 2020-08-31 PROCEDURE — 99221 1ST HOSP IP/OBS SF/LOW 40: CPT | Mod: 25 | Performed by: INTERNAL MEDICINE

## 2020-08-31 RX ORDER — POTASSIUM CHLORIDE 7.45 MG/ML
10 INJECTION INTRAVENOUS
Status: DISCONTINUED | OUTPATIENT
Start: 2020-08-31 | End: 2020-09-06

## 2020-08-31 RX ORDER — ASPIRIN 81 MG/1
81 TABLET, CHEWABLE ORAL DAILY
Status: DISCONTINUED | OUTPATIENT
Start: 2020-09-01 | End: 2020-09-15

## 2020-08-31 RX ORDER — MYCOPHENOLATE MOFETIL 500 MG/1
1000 TABLET ORAL
Status: DISCONTINUED | OUTPATIENT
Start: 2020-08-31 | End: 2020-09-08

## 2020-08-31 RX ORDER — MAGNESIUM SULFATE HEPTAHYDRATE 40 MG/ML
2 INJECTION, SOLUTION INTRAVENOUS DAILY PRN
Status: DISCONTINUED | OUTPATIENT
Start: 2020-08-31 | End: 2020-09-06

## 2020-08-31 RX ORDER — HEPARIN SODIUM 5000 [USP'U]/.5ML
5000 INJECTION, SOLUTION INTRAVENOUS; SUBCUTANEOUS EVERY 12 HOURS
Status: DISCONTINUED | OUTPATIENT
Start: 2020-08-31 | End: 2020-09-07

## 2020-08-31 RX ORDER — ROSUVASTATIN CALCIUM 10 MG/1
10 TABLET, COATED ORAL AT BEDTIME
Status: DISCONTINUED | OUTPATIENT
Start: 2020-08-31 | End: 2020-09-24 | Stop reason: HOSPADM

## 2020-08-31 RX ORDER — POTASSIUM CHLORIDE 1.5 G/1.58G
20-40 POWDER, FOR SOLUTION ORAL
Status: DISCONTINUED | OUTPATIENT
Start: 2020-08-31 | End: 2020-09-06

## 2020-08-31 RX ORDER — LIDOCAINE 40 MG/G
CREAM TOPICAL
Status: DISCONTINUED | OUTPATIENT
Start: 2020-08-31 | End: 2020-09-24 | Stop reason: HOSPADM

## 2020-08-31 RX ORDER — ACETAMINOPHEN 325 MG/1
650 TABLET ORAL EVERY 4 HOURS PRN
Status: DISCONTINUED | OUTPATIENT
Start: 2020-08-31 | End: 2020-09-06

## 2020-08-31 RX ORDER — PIPERACILLIN SODIUM, TAZOBACTAM SODIUM 4; .5 G/20ML; G/20ML
4.5 INJECTION, POWDER, LYOPHILIZED, FOR SOLUTION INTRAVENOUS EVERY 6 HOURS
Status: DISCONTINUED | OUTPATIENT
Start: 2020-08-31 | End: 2020-09-01

## 2020-08-31 RX ORDER — DEXTROSE MONOHYDRATE 25 G/50ML
25-50 INJECTION, SOLUTION INTRAVENOUS
Status: CANCELLED | OUTPATIENT
Start: 2020-08-31

## 2020-08-31 RX ORDER — POTASSIUM CL/LIDO/0.9 % NACL 10MEQ/0.1L
10 INTRAVENOUS SOLUTION, PIGGYBACK (ML) INTRAVENOUS
Status: DISCONTINUED | OUTPATIENT
Start: 2020-08-31 | End: 2020-09-06

## 2020-08-31 RX ORDER — TACROLIMUS 1 MG/1
3 CAPSULE ORAL
Status: DISCONTINUED | OUTPATIENT
Start: 2020-08-31 | End: 2020-09-02

## 2020-08-31 RX ORDER — NICOTINE POLACRILEX 4 MG
15-30 LOZENGE BUCCAL
Status: CANCELLED | OUTPATIENT
Start: 2020-08-31

## 2020-08-31 RX ORDER — NICOTINE POLACRILEX 4 MG
15-30 LOZENGE BUCCAL
Status: DISCONTINUED | OUTPATIENT
Start: 2020-08-31 | End: 2020-09-19

## 2020-08-31 RX ORDER — NYSTATIN 100000/ML
500000 SUSPENSION, ORAL (FINAL DOSE FORM) ORAL 4 TIMES DAILY
Status: DISCONTINUED | OUTPATIENT
Start: 2020-08-31 | End: 2020-09-05

## 2020-08-31 RX ORDER — MAGNESIUM SULFATE HEPTAHYDRATE 40 MG/ML
4 INJECTION, SOLUTION INTRAVENOUS EVERY 4 HOURS PRN
Status: DISCONTINUED | OUTPATIENT
Start: 2020-08-31 | End: 2020-09-06

## 2020-08-31 RX ORDER — VALGANCICLOVIR 450 MG/1
450 TABLET, FILM COATED ORAL DAILY
Status: DISCONTINUED | OUTPATIENT
Start: 2020-08-31 | End: 2020-09-04

## 2020-08-31 RX ORDER — HYDRALAZINE HYDROCHLORIDE 25 MG/1
25 TABLET, FILM COATED ORAL 3 TIMES DAILY
Status: DISCONTINUED | OUTPATIENT
Start: 2020-09-01 | End: 2020-09-01

## 2020-08-31 RX ORDER — POTASSIUM CHLORIDE 750 MG/1
20-40 TABLET, EXTENDED RELEASE ORAL
Status: DISCONTINUED | OUTPATIENT
Start: 2020-08-31 | End: 2020-09-06

## 2020-08-31 RX ORDER — POTASSIUM CHLORIDE 29.8 MG/ML
20 INJECTION INTRAVENOUS
Status: DISCONTINUED | OUTPATIENT
Start: 2020-08-31 | End: 2020-09-06

## 2020-08-31 RX ORDER — DEXTROSE MONOHYDRATE 25 G/50ML
25-50 INJECTION, SOLUTION INTRAVENOUS
Status: DISCONTINUED | OUTPATIENT
Start: 2020-08-31 | End: 2020-09-19

## 2020-08-31 RX ORDER — ACETAMINOPHEN 650 MG/1
650 SUPPOSITORY RECTAL EVERY 4 HOURS PRN
Status: DISCONTINUED | OUTPATIENT
Start: 2020-08-31 | End: 2020-09-06

## 2020-08-31 RX ORDER — CEFDINIR 300 MG/1
300 CAPSULE ORAL 2 TIMES DAILY
Status: DISCONTINUED | OUTPATIENT
Start: 2020-08-31 | End: 2020-08-31 | Stop reason: ALTCHOICE

## 2020-08-31 RX ORDER — PANTOPRAZOLE SODIUM 40 MG/1
40 TABLET, DELAYED RELEASE ORAL
Status: DISCONTINUED | OUTPATIENT
Start: 2020-09-01 | End: 2020-09-04

## 2020-08-31 RX ADMIN — Medication 1 TABLET: at 19:26

## 2020-08-31 RX ADMIN — SODIUM CHLORIDE 1000 ML: 9 INJECTION, SOLUTION INTRAVENOUS at 13:21

## 2020-08-31 RX ADMIN — HYDRALAZINE HYDROCHLORIDE 25 MG: 25 TABLET, FILM COATED ORAL at 23:27

## 2020-08-31 RX ADMIN — PIPERACILLIN AND TAZOBACTAM 4.5 G: 4; .5 INJECTION, POWDER, FOR SOLUTION INTRAVENOUS at 19:18

## 2020-08-31 RX ADMIN — VANCOMYCIN HYDROCHLORIDE 2000 MG: 10 INJECTION, POWDER, LYOPHILIZED, FOR SOLUTION INTRAVENOUS at 21:38

## 2020-08-31 RX ADMIN — NYSTATIN 500000 UNITS: 500000 SUSPENSION ORAL at 19:22

## 2020-08-31 RX ADMIN — MYCOPHENOLATE MOFETIL 1000 MG: 500 TABLET ORAL at 19:21

## 2020-08-31 RX ADMIN — Medication 5000 UNITS: at 23:14

## 2020-08-31 RX ADMIN — INSULIN ASPART 1 UNITS: 100 INJECTION, SOLUTION INTRAVENOUS; SUBCUTANEOUS at 19:24

## 2020-08-31 RX ADMIN — INSULIN GLARGINE 20 UNITS: 100 INJECTION, SOLUTION SUBCUTANEOUS at 22:52

## 2020-08-31 RX ADMIN — ROSUVASTATIN CALCIUM 10 MG: 10 TABLET, FILM COATED ORAL at 23:14

## 2020-08-31 RX ADMIN — TACROLIMUS 3 MG: 1 CAPSULE ORAL at 19:19

## 2020-08-31 ASSESSMENT — ENCOUNTER SYMPTOMS
VOMITING: 0
HEADACHES: 0
WEAKNESS: 0
COLOR CHANGE: 0
DIZZINESS: 0
ABDOMINAL PAIN: 0
CHILLS: 1
SHORTNESS OF BREATH: 0
ARTHRALGIAS: 0
NECK STIFFNESS: 0
MYALGIAS: 1
DIFFICULTY URINATING: 0
CONFUSION: 0
EYE REDNESS: 0
COUGH: 1
LIGHT-HEADEDNESS: 1
DIARRHEA: 1
NAUSEA: 0
FEVER: 1

## 2020-08-31 ASSESSMENT — MIFFLIN-ST. JEOR
SCORE: 1535.24
SCORE: 1555.65

## 2020-08-31 ASSESSMENT — PAIN DESCRIPTION - DESCRIPTORS: DESCRIPTORS: ACHING

## 2020-08-31 NOTE — ED PROVIDER NOTES
[facility] EMERGENCY DEPARTMENT ([facility])  August 31, 2020  History     Chief Complaint   Patient presents with     Fever     Loss of Consciousness     The history is provided by the patient and medical records.     Mariusz Sharp is a 59 year old male with a history of ICM (LVEF 20-25%) s/p LVAD (HM3, 12/31/2018), s/p heart transplant (5/18/2020), CAD, HFrEF, monomorphic VT s/p ICD (7/16/2018), STEMI, type 2 diabetes mellitus, CKD, HTN, and HLD who presents to the Emergency Department for evaluation following loss of consciousness.    Per chart review patient was recently hospitalized from 8/18/2020-8/21/2020.  Patient presented to the ED with 1 day history of fever.  He also had productive cough and mild dyspnea with exertion.  Patient was found to have retrocardiac opacity on chest radiograph concerning for pneumonia.  She was started on ceftriaxone and azithromycin in the ED for community acquired pneumonia.  Patient was also started on Cefdinir x6 weeks.  Patient's COVID test was negative.    Here in the ED, patient reports his cough has continued since his last admission.  Patient reports his cough has worsened and is productive with clear sputum.  He states last night around 8:30 PM he was sitting down and lost consciousness from coughing.  He states he felt lightheaded at this time as well.  Patient reports he had a fever of 101.3 last night and took Tylenol with relief.  He states he had a temperature of 100.1 this morning.  He also reports chills.  Patient reports he had diarrhea 4 to 5 days ago and is now having loose, non-watery stools.  He is also having some aches in his back and upper chest.  Patient denies weakness, dizziness, nausea, vomiting.  No sick contacts.    PAST MEDICAL HISTORY:   Past Medical History:   Diagnosis Date     Acute kidney injury (H)      CKD (chronic kidney disease)      Coronary artery disease      Diabetes mellitus (H)      HTN (hypertension)      Hyperlipidemia       ICD (implantable cardioverter-defibrillator), single chamber- NOT dependent 7/17/2018     Ischemic cardiomyopathy      LV (left ventricular) mural thrombus      Paroxysmal atrial flutter (H)      RVF (right ventricular failure) (H)      Systolic heart failure (H)      Ventricular tachycardia (H)        PAST SURGICAL HISTORY:   Past Surgical History:   Procedure Laterality Date     COLONOSCOPY N/A 12/7/2018    Procedure: COLONOSCOPY;  Surgeon: Krish Mercado MD;  Location: [facility] OR     CV HEART BIOPSY N/A 5/24/2020    Procedure: Heart Biopsy;  Surgeon: Kit Bacon MD;  Location: [facility] HEART CARDIAC CATH LAB     CV HEART BIOPSY N/A 6/1/2020    Procedure: CV HEART BIOPSY;  Surgeon: Kit Bacon MD;  Location: [facility] HEART CARD[facility]AC CATH LAB     CV HEART BIOPSY N/A 6/8/2020    Procedure: CV HEART BIOPSY;  Surgeon: Kit Bacon MD;  [facility]: [facility]     CV HEART BIOPSY N/A 6/15/2020    Procedure: CV HEART BIOPSY;  Surgeon: Kit Bacon MD;  [facility]: [facility] LAB     CV HEART BIOPSY N/A 6/30/2020    Procedure: CV HEART BIOPSY;  Surgeon: Kit Bacon MD;  [facility]     CV HEART BIOPSY N/A 7/14/2020    Procedure: CV HEART BIOPSY;  Surgeon: Brian Long MD;  [facility]: [facility]     CV HEART BIOPSY N/A 7/29/2020    Procedure: CV HEART BIOPSY;  Surgeon: Momo Hunt MD;  [facility]     CV HEART BIOPSY N/A 8/13/2020    Procedure: CV HEART BIOPSY;  Surgeon: Khris Mcclelland MD;  [facility]: [facility]     CV HEART BIOPSY N/A 8/26/2020    Procedure: CV HEART BIOPSY;  Surgeon: Momo Hunt MD;  [facility]     CV RIGHT HEART CATH N/A 2/19/2019    Procedure: RHC;  Surgeon: Brian Long MD;  Location: [facility]     CV RIGHT HEART CATH N/A 7/5/2019    Procedure: CV  RIGHT HEART CATH;  Surgeon: Khris Mcclelland MD;  Location:  HEART CARDIAC CATH LAB     CV RIGHT HEART CATH N/A 5/24/2020    Procedure: Right Heart Cath;  Surgeon: Kit Bacon MD;  Location:  HEART CARDIAC CATH LAB     CV RIGHT HEART CATH N/A 6/1/2020    Procedure: CV RIGHT HEART CATH;  Surgeon: Kit Bacon MD;  Location:  HEART CARDIAC CATH LAB     CV RIGHT HEART CATH N/A 6/8/2020    Procedure: CV RIGHT HEART CATH;  Surgeon: Kit Bacon MD;  Location:  HEART CARDIAC CATH LAB     CV RIGHT HEART CATH N/A 6/15/2020    Procedure: CV RIGHT HEART CATH;  Surgeon: Kit Bacon MD;  Location:  HEART CARDIAC CATH LAB     CV RIGHT HEART CATH N/A 6/30/2020    Procedure: CV RIGHT HEART CATH;  Surgeon: Kit Bacon MD;  Location:  HEART CARDIAC CATH LAB     CV RIGHT HEART CATH N/A 7/14/2020    Procedure: CV RIGHT HEART CATH;  Surgeon: Brian Long MD;  Location:  HEART CARDIAC CATH LAB     CV RIGHT HEART CATH N/A 7/29/2020    Procedure: CV RIGHT HEART CATH;  Surgeon: Momo Hunt MD;  Location:  HEART CARDIAC CATH LAB     CV RIGHT HEART CATH N/A 8/13/2020    Procedure: CV RIGHT HEART CATH;  Surgeon: Khris Mcclelland MD;  Location:  HEART CARDIAC CATH LAB     CV RIGHT HEART CATH N/A 8/26/2020    Procedure: CV RIGHT HEART CATH;  Surgeon: Momo Hunt MD;  Location:  HEART CARDIAC CATH LAB     HC PRQ TRLUML CORONARY ANGIOPLASTY ADDL BRANCH      PCI and ALYSSA to ostial LAD on 6/27/18     IMPLANT AUTOMATIC IMPLANTABLE CARDIOVERTER DEFIBRILLATOR       INSERT VENTRICULAR ASSIST DEVICE LEFT (HEARTMATE II) N/A 12/31/2018    Procedure: Median Sternotomy, On Cardiopulmonary Bypass Pump, Insertion of Left Ventricular Assist Device (Heartmate III);  Surgeon: Kamaljit Baker MD;  Location: [facility] OR     PICC DOUBLE LUMEN PLACEMENT Right 05/22/2020    5FR DL PICC inserted at right basilic vein, length 38cm.      TRANSPLANT HEART RECIPIENT AFTER HEART MATE N/A 5/18/2020    Procedure: REDO MEDIAN STERNOTOMY, LYSIS OF ADHESIONS, ON CARDIOPULMONARY BYPASS PUMP, HEART TRANSPLANT RECIPIENT, REMOVAL OF LEFT VENTRICULAR ASSIST DEVICE, REMOVAL OF AUTOMATED IMPLANTABLE CARDIOVERTER DEFIBRILLATOR;  Surgeon: Elder Willis MD;  Location: [facility] OR       Past medical history, past surgical history, medications, and allergies were reviewed with the patient. Additional pertinent items: None    FAMILY HISTORY: No family history on file.    SOCIAL HISTORY:   Social History     Tobacco Use     Smoking status: Never Smoker     Smokeless tobacco: Never Used   Substance Use Topics     Alcohol use: No     Frequency: Never     Social history was reviewed with the patient. Additional pertinent items: None      Patient's Medications   New Prescriptions    No medications on file   Previous Medications    ACCU-CHEK [place] PLUS TEST STRIP    Check BG 3 times daily at meals and at bedtime.    ACETAMINOPHEN (TYLENOL) 325 MG TABLET    Take 2 tablets (650 mg) by mouth every 4 hours as needed for other (multimodal surgical pain management along with NSAIDS and opioid medication as indicated based on pain control and physical function.)    AMLODIPINE (NORVASC) 5 MG TABLET    Take 2 tablets (10 mg) by mouth At Bedtime    ASPIRIN (ASA) 81 MG CHEWABLE TABLET    Take 1 tablet (81 mg) by mouth daily    BD SILVANA U/F 32G X 4 MM INSULIN PEN NEEDLE    Use 3-4 daily.    CALCIUM CARBONATE 600 MG-VITAMIN D 400 UNITS (CALTRATE) 600-400 MG-UNIT PER TABLET    Take 1 tablet by mouth 2 times daily (with meals)    CEFDINIR (OMNICEF) 300 MG CAPSULE    Take 1 capsule (300 mg) by mouth 2 times daily    CONTINUOUS BLOOD GLUC  (DEXCOM G6 ) DON    1 each daily    CONTINUOUS BLOOD GLUC SENSOR (DEXCOM G6 SENSOR) MISC    1 each every 10 days    CONTINUOUS BLOOD GLUC TRANSMIT (DEXCOM G6 TRANSMITTER) MISC    1 each every 3 months    HYDRALAZINE (APRESOLINE) 25  MG TABLET    Take 1 tablet (25 mg) by mouth 3 times daily    INSULIN ASPART (NOVOLOG PEN) 100 UNIT/ML PEN    Correction Scale - HIGH INSULIN RESISTANCE DOSING     Do Not give Correction Insulin if Pre-Meal BG less than 140.   For Pre-Meal  - 164 give 1 unit.   For Pre-Meal  - 189 give 2 units.   For Pre-Meal  - 214 give 3 units.   For Pre-Meal  - 239 give 4 units.   For Pre-Meal  - 264 give 5 units.   For Pre-Meal  - 289 give 6 units.   For Pre-Meal  - 314 give 7 units.   For Pre-Meal  - 339 give 8 units.   For Pre-Meal  - 364 give 9 units.   For Pre-Meal BG greater than or equal to 365 give 10 units  To be given with prandial insulin, and based on pre-meal blood glucose.   Notify provider if glucose greater than or equal to 350 mg/dL after administration of correction dose. If given at mealtime, administer within 30 minutes of start of meal    INSULIN ASPART (NOVOLOG PEN) 100 UNIT/ML PEN    Inject 1-7 Units Subcutaneous At Bedtime HIGH INSULIN RESISTANCE DOSING    Do Not give Bedtime Correction Insulin if BG less than 200.   For  - 224 give 1 units.   For  - 249 give 2 units.   For  - 274 give 3 units.   For  - 299 give 4 units.   For  - 324 give 5 units.   For  - 349 give 6 units.   For BG greater than or equal to 350 give 7 units.   Notify provider if glucose greater than or equal to 350 mg/dL after administration of correction dose. If given at mealtime, administer within 30 minutes of start of meal    INSULIN ASPART (NOVOLOG PEN) 100 UNIT/ML PEN    1 unit / 8  gms CHO with meals,plus correction insulin. Pt uses approx 40 units in 24 hrs.    INSULIN GLARGINE (LANTUS PEN) 100 UNIT/ML PEN    Inject 32 Units Subcutaneous every evening Inject 32 units subcutaneous daily.    MAGNESIUM OXIDE (MAG-OX) 400 (241.3 MG) MG TABLET    Take 1 tablet (400 mg) by mouth 2 times daily    MYCOPHENOLATE (GENERIC EQUIVALENT) 500 MG TABLET     "Take 2 tablets (1,000 mg) by mouth 2 times daily    ROSUVASTATIN (CRESTOR) 10 MG TABLET    TAKE ONE TABLET BY MOUTH ONCE DAILY    TACROLIMUS (GENERIC EQUIVALENT) 1 MG CAPSULE    Take 3 capsules (3 mg) by mouth 2 times daily Take 3mg BID    VALGANCICLOVIR (VALCYTE) 450 MG TABLET    TAKE ONE TABLET (450MG) BY MOUTH DAILY   Modified Medications    No medications on file   Discontinued Medications    No medications on file        No Known Allergies     Review of Systems   Constitutional: Positive for chills and fever.   HENT: Negative for congestion.    Eyes: Negative for redness.   Respiratory: Positive for cough. Negative for shortness of breath.    Cardiovascular: Negative for chest pain.   Gastrointestinal: Positive for diarrhea (resolved). Negative for abdominal pain, nausea and vomiting.   Genitourinary: Negative for difficulty urinating.   Musculoskeletal: Positive for myalgias (back, upper chest). Negative for arthralgias and neck stiffness.   Skin: Negative for color change.   Neurological: Positive for syncope and light-headedness (resolved). Negative for dizziness, weakness and headaches.   Psychiatric/Behavioral: Negative for confusion.   All other systems reviewed and are negative.    A complete review of systems was performed with pertinent positives and negatives noted in the HPI, and all other systems negative.    Physical Exam   BP: 137/80  Pulse: 117  Temp: 98.9  F (37.2  C)  Resp: 18  Height: 162.6 cm (5' 4\")  Weight: 82 kg (180 lb 11.2 oz)(scale)  SpO2: 97 %  Lying Orthostatic BP: 132/88  Lying Orthostatic Pulse: 110 bpm  Sitting Orthostatic BP: 109/79  Sitting Orthostatic Pulse: 112 bpm  Standing Orthostatic BP: 108/80  Standing Orthostatic Pulse: 116 bpm      Physical Exam  Constitutional:       Appearance: He is well-developed.   HENT:      Head: Normocephalic.   Eyes:      Pupils: Pupils are equal, round, and reactive to light.   Neck:      Musculoskeletal: Normal range of motion and neck supple. "   Cardiovascular:      Rate and Rhythm: Normal rate and regular rhythm.   Pulmonary:      Effort: Pulmonary effort is normal.      Breath sounds: Normal breath sounds.   Abdominal:      General: Bowel sounds are normal.      Palpations: Abdomen is soft.   Musculoskeletal: Normal range of motion.   Skin:     General: Skin is warm and dry.      Capillary Refill: Capillary refill takes less than 2 seconds.   Neurological:      Mental Status: He is alert and oriented to person, place, and time.         ED Course   12:32 PM  The patient was seen and examined by Lowell Snow MD in Room ED06.      Procedures             Results for orders placed or performed during the hospital encounter of 08/31/20 (from the past 24 hour(s))   Comprehensive metabolic panel   Result Value Ref Range    Sodium 130 (L) 133 - 144 mmol/L    Potassium 4.0 3.4 - 5.3 mmol/L    Chloride 100 94 - 109 mmol/L    Carbon Dioxide 22 20 - 32 mmol/L    Anion Gap 8 3 - 14 mmol/L    Glucose 186 (H) 70 - 99 mg/dL    Urea Nitrogen 33 (H) 7 - 30 mg/dL    Creatinine 1.60 (H) 0.66 - 1.25 mg/dL    GFR Estimate 47 (L) >60 mL/min/[1.73_m2]    GFR Estimate If Black 54 (L) >60 mL/min/[1.73_m2]    Calcium 9.6 8.5 - 10.1 mg/dL    Bilirubin Total 0.8 0.2 - 1.3 mg/dL    Albumin 3.1 (L) 3.4 - 5.0 g/dL    Protein Total 7.3 6.8 - 8.8 g/dL    Alkaline Phosphatase 76 40 - 150 U/L    ALT 14 0 - 70 U/L    AST 21 0 - 45 U/L   CBC with platelets differential   Result Value Ref Range    WBC 4.2 4.0 - 11.0 10e9/L    RBC Count 3.50 (L) 4.4 - 5.9 10e12/L    Hemoglobin 9.7 (L) 13.3 - 17.7 g/dL    Hematocrit 31.3 (L) 40.0 - 53.0 %    MCV 89 78 - 100 fl    MCH 27.7 26.5 - 33.0 pg    MCHC 31.0 (L) 31.5 - 36.5 g/dL    RDW 14.6 10.0 - 15.0 %    Platelet Count 374 150 - 450 10e9/L    Diff Method Manual Differential     % Neutrophils 83.2 %    % Lymphocytes 8.8 %    % Monocytes 5.3 %    % Eosinophils 1.8 %    % Basophils 0.9 %    Absolute Neutrophil 3.5 1.6 - 8.3 10e9/L    Absolute Lymphocytes  0.4 (L) 0.8 - 5.3 10e9/L    Absolute Monocytes 0.2 0.0 - 1.3 10e9/L    Absolute Eosinophils 0.1 0.0 - 0.7 10e9/L    Absolute Basophils 0.0 0.0 - 0.2 10e9/L    Anisocytosis Slight     Poikilocytosis Slight     Microcytes Present     Platelet Estimate Confirming automated cell count    Lactic acid   Result Value Ref Range    Lactic Acid 1.5 0.7 - 2.0 mmol/L   Symptomatic COVID-19 Virus (Coronavirus) by PCR    Specimen: Nasopharyngeal   Result Value Ref Range    COVID-19 Virus PCR to [facility] - Source Nasopharyngeal     COVID-19 Virus PCR to [facility] - Result       Test received-See reflex to IDDL test SARS CoV2 (COVID-19) Virus RT-PCR   Head CT w/o contrast    Narrative    CT HEAD W/O CONTRAST 8/31/2020 2:43 PM    History: Head trauma, minor, GCS>=13, low clinical risk, initial exam     Comparison: 1/17/2019    Technique: Using multidetector thin collimation helical acquisition  technique, axial, coronal and sagittal CT images from the skull base  to the vertex were obtained without intravenous contrast.    Findings: There is no intracranial hemorrhage, mass effect, or midline  shift. Gray/white matter differentiation in both cerebral hemispheres  is preserved. Mild cerebral volume loss. Ventricles are proportionate  to the cerebral sulci. The basal cisterns are clear. Atherosclerotic  calcification at the carotid siphons.    The bony calvaria and the bones of the skull base are normal. The  visualized portions of the paranasal sinuses and mastoid air cells are  clear.       Impression    Impression:  No acute intracranial pathology.     I have personally reviewed the examination and initial interpretation  and I agree with the findings.    EBONI REDMAN MD     Medications   lidocaine 1 % 0.1-1 mL (has no administration in time range)   lidocaine (LMX4) cream (has no administration in time range)   sodium chloride (PF) 0.9% PF flush 3 mL (has no administration in time range)   sodium chloride (PF) 0.9% PF flush 3  mL (has no administration in time range)   acetaminophen (TYLENOL) tablet 650 mg (has no administration in time range)   acetaminophen (TYLENOL) Suppository 650 mg (has no administration in time range)   Patient is already receiving mechanical prophylaxis (has no administration in time range)   Continuing ACE inhibitor/ARB/ARNI from home medication list OR ACE inhibitor/ARB/ARNI order already placed during this visit (has no administration in time range)   Continuing beta blocker from home medication list OR beta blocker order already placed during this visit (has no administration in time range)   potassium chloride ER (KLOR-CON M) CR tablet 20-40 mEq (has no administration in time range)   potassium chloride (KLOR-CON) Packet 20-40 mEq (has no administration in time range)   potassium chloride 10 mEq in 100 mL sterile water intermittent infusion (premix) (has no administration in time range)   potassium chloride 10 mEq in 100 mL intermittent infusion with 10 mg lidocaine (has no administration in time range)   potassium chloride 20 mEq in 50 mL intermittent infusion (has no administration in time range)   magnesium sulfate 2 g in water intermittent infusion (has no administration in time range)   magnesium sulfate 4 g in 100 mL sterile water (premade) (has no administration in time range)   aspirin (ASA) chewable tablet 81 mg (has no administration in time range)   mycophenolate (CELLCEPT BRAND) tablet 1,500 mg (has no administration in time range)   rosuvastatin (CRESTOR) tablet 10 mg (has no administration in time range)   valGANciclovir (VALCYTE) tablet 450 mg (has no administration in time range)   tacrolimus (GENERIC EQUIVALENT) capsule 3 mg (has no administration in time range)   0.9% sodium chloride BOLUS (1,000 mLs Intravenous New Bag 8/31/20 1321)             Assessments & Plan (with Medical Decision Making)   59-year-old male with recent heart transplant who presents for evaluation of fever, cough and  slight increase in shortness of breath.  Differential included community-acquired pneumonia, hospital-acquired pneumonia, COVID, bronchospasm, pneumothorax, other infection such as UTI or skin, medication side effect.  Exam revealed elevated heart rate with otherwise normal vital signs.  Other laboratories including comprehensive metabolic panel revealed creatinine of 1.6 down from most recent value of 1.9.  Sodium was 130.  CBC revealed stable anemia.  Glucose was elevated at 186.  Chest x-ray revealed no lobar infiltrates.  2 blood cultures were obtained.  Patient was discussed at length with cardiology 2 staff -Dr. Ortiz and Dr. Nielson- and the patient will be admitted for further evaluation and management.  I have reviewed the nursing notes.    I have reviewed the findings, diagnosis, plan and need for follow up with the patient.    New Prescriptions    No medications on file       Final diagnoses:   Syncope, unspecified syncope type   Fever and chills     I, Macey Ambrosio, am serving as a trained medical scribe to document services personally performed by Lowell Snow MD, based on the provider's statements to me.      ILowell MD, was physically present and have reviewed and verified the accuracy of this note documented by Macey Ambrosio.     8/31/2020   [facility] [place], EMERGENCY DEPARTMENT     Peng Snow MD  08/31/20 [id number]

## 2020-08-31 NOTE — PHARMACY-VANCOMYCIN DOSING SERVICE
Pharmacy Vancomycin Initial Note  Date of Service 2020  Patient's  1960  59 year old, male    Indication: [facility] Pneumonia    Current estimated CrCl = Estimated Creatinine Clearance: 49.2 mL/min (A) (based on SCr of 1.6 mg/dL (H)).    Creatinine for last 3 days  2020: 12:59 PM Creatinine 1.60 mg/dL    Recent Vancomycin Level(s) for last 3 days  No results found for requested labs within last 72 hours.      Vancomycin IV Administrations (past 72 hours)      No vancomycin orders with administrations in past 72 hours.                Nephrotoxins and other renal medications (From now, onward)    Start     Dose/Rate Route Frequency Ordered Stop    20 1400  vancomycin 1500 mg in 0.9% NaCl 250 ml intermittent infusion 1,500 mg      1,500 mg  over 90 Minutes Intravenous EVERY 18 HOURS 20 18320 1900  vancomycin (VANCOCIN) 2,000 mg in sodium chloride 0.9 % 500 mL intermittent infusion      2,000 mg  over 2 Hours Intravenous ONCE 20 1833      08/31/20 1845  piperacillin-tazobactam (ZOSYN) 4.5 g vial to attach to  mL bag      4.5 g  over 30 Minutes Intravenous EVERY 6 HOURS 20 18120 1800  tacrolimus (GENERIC EQUIVALENT) capsule 3 mg      3 mg Oral 2 TIMES DAILY. 20 1456            Contrast Orders - past 72 hours (72h ago, onward)    None                Plan:  1.  Start vancomycin  2000 mg IV x1 (25 mg/kg), followed by 1500 mg IV q18h (18 mg/kg).   2.  Goal Trough Level: 15-20 mg/L   3.  Pharmacy will check trough levels as appropriate in 1-3 Days.    4. Serum creatinine levels will be ordered daily for the first week of therapy and at least twice weekly for subsequent weeks.    5. [place] method utilized to dose vancomycin therapy: Method 2

## 2020-08-31 NOTE — H&P
.          [facility], [place]    Cardiology History and Physical - Cards 2         Date of Admission:  8/31/2020    Assessment & Plan: HVSL    Mariusz Sharp is a 59 year old male with history of CAD (s/p STEMI with ALYSSA to LAD 6/27/18), ICM (LVEF 20-25%) s/p HM3 LVAD (12/31/18), monomorphic VT (s/p ICD with shocks x4 7/16/18), LV thrombus, CKD, elevated PSA, pAF, HTN, HL, and DMII, now s/p OHT 5/18/20 who presents with a syncopal episode, cough and high temp from last night.    #Fever, diarrhea and cough  The above complaints with sore throat are concerning for infectious/inflammatory process. The procalcitonin is upper wnl. He is not having active diarrhea. On exam, mild L cervical lymphadenopathy, no pharyngeal exudate, no thrush and no signs of otitis media. COVID negative. UA is negative.  -Blood cultures ordered  -Broad abx coverage - vanc + zosyn  -Pending labs: strep swab, PCP, EBV DNA, Taxo, crypto, strep pneumo, legionella  -Consult for ID Transplant     #Syncopy in the setting of worsening RADER  The syncopal episode with worsening RADER is concerning for acute cellular rejection. The last biopsy 8/26 findings showed focal mild rejection, Grade 1R/1A. However, unclear the clinical significance of the biopsy findings  -Echo ordered    # Status post OHT on 5/18/20, history of ICM  # Chronic immunosuppression  * Rejection history: none.   * Recent immunosuppression changes: MMF decrease while in hospital  * Last biopsy: 8/26, pending  * Intolerance to medications: none     Graft function:  - Fluid status: euvalemic on exam  - BP: on admission 130/99  - HRs: > 80     Immunosuppression:  - Received Simulect 5/18 and 5/22  - MMF 1000mg bid, reduced due to infection and leukopenia  - tacrolimus goal level 10-12, ordered for level tomorrow.   - Has been taken off of prednisone     PPx:  - CAV: aspirin 81mg daily, crestor 10mg daily  - PCP: Bactrim stopped 5/27 due to hyperkalemia,  pentamidine given 6/30, and 7/28, no further ppx needed since steroids are stopping  - CMV: Valcyte 450mg daily (renally dosed), x6 months (through 11/2020)  - Thrush: Nystatin QID  - Osteoporosis: calcium/vitamin D   - GI: pantoprazole 40mg daily     Serostatus: CMV D+/R-  EBV D-/R+ Toxo D-/R-  Plan:   -Check sirolimus level tomorrow (9/1)  -ID transplant consult     #Hypertension  Withheld PTA amlodipine 10 mg daily and hydralazine 25 mg tid.     #Hypomagnesium    mg bid supplement.      #DM Type II   Takes home lantus 32 U and does carb count  -Started on Lantus 20 U and sliding scale correction. Modify plan tomorrow based on BS      #Elevated PSA  Prostate MRI showed signs of BPH. He has been referred to urology. Diagnosed with chronic prostatitis per ID  - Prolonged course of oral cefdinir 300mg BID x6 weeks (stop date 10/01/2020)     Diet:     DVT Prophylaxis: Enoxaparin (Lovenox) SQ  Perez Catheter: not present  Code Status: Full code  Fluids: received 1 L bolus at the ED  Lines: PIV         Disposition Plan   Expected discharge: 2 - 3 days, recommended to prior living arrangement once work up is finished    Entered: DENILSON HARDING 08/31/2020, 4:16 PM     The patient's care was discussed with the Dr. Ortiz in the AM    Denilson Harding MD  Internal Medicine PGY 1  [facility]  ASCOM: [id number]    Pager: [phone]  Please see sticky note for cross cover information  ______________________________________________________________________    Chief Complaint   Syncopal episode and fever       History of Present Illness   Mariusz Sharp is a 59 year old male with history of CAD (s/p STEMI with ALYSSA to LAD 6/27/18), ICM (LVEF 20-25%) s/p HM3 LVAD (12/31/18), monomorphic VT (s/p ICD with shocks x4 7/16/18), LV thrombus, CKD, elevated PSA, pAF, HTN, HL, and DMII, now s/p OHT 5/18/20 who presents with a syncopal episode, cough and high temp from last night.    Describes a syncopal  "episode last night. It started with a prolonged dry cough, felt weak, and fell on his forehead. It was unwitnessed episode. Denies hitting concrete. Endorses loss of consciousness, lasted for about a minute or two. Denies confused post LOC. Has had similar witnessed syncopal episode in 2002. In the past week, he also reports a non-bloody diarrhea that lasted for 2-3 days, resolved on it own, now his stools are loose. Endorses R otalgia, headaches, sore throat. He reports he had a fever last night, measure 101.3 that subsided with tylenol.    OHT 5/18/20. Daily function status improved post transplant.  However, the past few weeks, reports he has been getting dyspnea on exertion, \"I struggled taking the trash out the other day\". Can walk ~50 feet before getting winded. Denies orthopnea, PND or LE edema.     Patient was recently treated from CAP on 8/21. CXR at that time showed retrocardiac opacity concerning for CAP. ID workup thus far remarkable for RSV positive for rhinovirus. Treated with Ceftriaxone and Azithromycin. Patient symptomatic improved at time of discharge.    ED course: No significant changes noted in his labs compared to last admission. CXR no pertinent findings. UA negative. Received 1L bolus at the ED.      Review of Systems    Negative except as noted above    Past Medical History    I have reviewed this patient's medical history and updated it with pertinent information if needed.   Past Medical History:   Diagnosis Date     Acute kidney injury (H)      CKD (chronic kidney disease)      Coronary artery disease      Diabetes mellitus (H)      HTN (hypertension)      Hyperlipidemia      ICD (implantable cardioverter-defibrillator), single chamber- NOT dependent 7/17/2018     Ischemic cardiomyopathy      LV (left ventricular) mural thrombus      Paroxysmal atrial flutter (H)      RVF (right ventricular failure) (H)      Systolic heart failure (H)      Ventricular tachycardia (H)        Past Surgical " History   I have reviewed this patient's surgical history and updated it with pertinent information if needed.  Past Surgical History:   Procedure Laterality Date     COLONOSCOPY N/A 12/7/2018    Procedure: COLONOSCOPY;  Surgeon: Krish Mercado MD;  [facility]     CV HEART BIOPSY N/A 5/24/2020    Procedure: Heart Biopsy;  Surgeon: Kit Bacon MD;  [facility]     CV HEART BIOPSY N/A 6/1/2020    Procedure: CV HEART BIOPSY;  Surgeon: Kit Bacon MD;  [facility]     CV HEART BIOPSY N/A 6/8/2020    Procedure: CV HEART BIOPSY;  Surgeon: Kit Bacon MD;  [facility]     CV HEART BIOPSY N/A 6/15/2020    Procedure: CV HEART BIOPSY;  Surgeon: Kit Bacon MD;  [facility]     CV HEART BIOPSY N/A 6/30/2020    Procedure: CV HEART BIOPSY;  Surgeon: Kit Bacon MD;  [facility]     CV HEART BIOPSY N/A 7/14/2020    Procedure: CV HEART BIOPSY;  Surgeon: Brian Long MD;  [facility]     CV HEART BIOPSY N/A 7/29/2020    Procedure: CV HEART BIOPSY;  Surgeon: Momo Hunt MD;  [facility]     CV HEART BIOPSY N/A 8/13/2020    Procedure: CV HEART BIOPSY;  Surgeon: Khris Mcclelland MD;  [facility]     CV HEART BIOPSY N/A 8/26/2020    Procedure: CV HEART BIOPSY;  Surgeon: Momo Hunt MD;  [facility]     CV RIGHT HEART CATH N/A 2/19/2019    Procedure: RHC;  Surgeon: Brian Long MD;  [facility]     CV RIGHT HEART CATH N/A 7/5/2019    Procedure: CV RIGHT HEART CATH;  Surgeon: Khris Mcclelland MD;  [facility]     CV RIGHT HEART CATH N/A 5/24/2020    Procedure: Right Heart Cath;  Surgeon: Kit Bacon MD;  Location:   HEART CARDIAC CATH LAB     CV RIGHT HEART CATH N/A 6/1/2020    Procedure: CV RIGHT HEART CATH;  Surgeon: Kit Bacon MD;  Location:  HEART CARDIAC CATH LAB     CV RIGHT HEART CATH N/A 6/8/2020    Procedure: CV RIGHT HEART CATH;  Surgeon: Kit Bacon MD;  Location:  HEART CARDIAC CATH LAB     CV RIGHT HEART CATH N/A 6/15/2020    Procedure: CV RIGHT HEART CATH;  Surgeon: Kit Bacon MD;  Location:  HEART CARDIAC CATH LAB     CV RIGHT HEART CATH N/A 6/30/2020    Procedure: CV RIGHT HEART CATH;  Surgeon: Kit Bacon MD;  Location:  HEART CARDIAC CATH LAB     CV RIGHT HEART CATH N/A 7/14/2020    Procedure: CV RIGHT HEART CATH;  Surgeon: Brian Long MD;  Location:  HEART CARDIAC CATH LAB     CV RIGHT HEART CATH N/A 7/29/2020    Procedure: CV RIGHT HEART CATH;  Surgeon: Momo Hunt MD;  Location:  HEART CARDIAC CATH LAB     CV RIGHT HEART CATH N/A 8/13/2020    Procedure: CV RIGHT HEART CATH;  Surgeon: Khris Mcclelland MD;  Location:  HEART CARDIAC CATH LAB     CV RIGHT HEART CATH N/A 8/26/2020    Procedure: CV RIGHT HEART CATH;  Surgeon: Momo Hunt MD;  Location:  HEART CARDIAC CATH LAB     HC PRQ TRLUML CORONARY ANGIOPLASTY ADDL BRANCH      PCI and ALYSSA to ostial LAD on 6/27/18     IMPLANT AUTOMATIC IMPLANTABLE CARDIOVERTER DEFIBRILLATOR       INSERT VENTRICULAR ASSIST DEVICE LEFT (HEARTMATE II) N/A 12/31/2018    Procedure: Median Sternotomy, On Cardiopulmonary Bypass Pump, Insertion of Left Ventricular Assist Device (Heartmate III);  Surgeon: Kamaljit Baker MD;  [facility]     PICC DOUBLE LUMEN PLACEMENT Right 05/22/2020    5FR DL PICC inserted at right basilic vein, length 38cm.     TRANSPLANT HEART RECIPIENT AFTER HEART MATE N/A 5/18/2020    Procedure: REDO MEDIAN STERNOTOMY, LYSIS OF ADHESIONS, ON CARDIOPULMONARY BYPASS PUMP, HEART TRANSPLANT RECIPIENT, REMOVAL OF LEFT VENTRICULAR ASSIST  DEVICE, REMOVAL OF AUTOMATED IMPLANTABLE CARDIOVERTER DEFIBRILLATOR;  Surgeon: Elder Willis MD;  Location: [facility] OR       Social History   I have reviewed this patient's social history and updated it with pertinent information if needed.  Social History     Tobacco Use     Smoking status: Never Smoker     Smokeless tobacco: Never Used   Substance Use Topics     Alcohol use: No     Frequency: Never     Drug use: No     Family History   I have reviewed this patient's family history and updated it with pertinent information if needed.         Prior to Admission Medications   Prior to Admission Medications   Prescriptions Last Dose Informant Patient Reported? Taking?   ACCU-CHEK [place] PLUS test strip   No No   Sig: Check BG 3 times daily at meals and at bedtime.   BD SILVANA U/F 32G X 4 MM insulin pen needle   No No   Sig: Use 3-4 daily.   Continuous Blood Gluc  (DEXCOM G6 ) DON   No No   Si each daily   Continuous Blood Gluc Sensor (DEXCOM G6 SENSOR) MISC   No No   Si each every 10 days   Continuous Blood Gluc Transmit (DEXCOM G6 TRANSMITTER) MISC   No No   Si each every 3 months   acetaminophen (TYLENOL) 325 MG tablet  Self No No   Sig: Take 2 tablets (650 mg) by mouth every 4 hours as needed for other (multimodal surgical pain management along with NSAIDS and opioid medication as indicated based on pain control and physical function.)   amLODIPine (NORVASC) 5 MG tablet   No No   Sig: Take 2 tablets (10 mg) by mouth At Bedtime   aspirin (ASA) 81 MG chewable tablet  Self No No   Sig: Take 1 tablet (81 mg) by mouth daily   calcium carbonate 600 mg-vitamin D 400 units (CALTRATE) 600-400 MG-UNIT per tablet  Self No No   Sig: Take 1 tablet by mouth 2 times daily (with meals)   cefdinir (OMNICEF) 300 MG capsule   No No   Sig: Take 1 capsule (300 mg) by mouth 2 times daily   hydrALAZINE (APRESOLINE) 25 MG tablet   No No   Sig: Take 1 tablet (25 mg) by mouth 3 times daily   insulin aspart  (NOVOLOG PEN) 100 UNIT/ML pen  Self No No   Sig: Correction Scale - HIGH INSULIN RESISTANCE DOSING     Do Not give Correction Insulin if Pre-Meal BG less than 140.   For Pre-Meal  - 164 give 1 unit.   For Pre-Meal  - 189 give 2 units.   For Pre-Meal  - 214 give 3 units.   For Pre-Meal  - 239 give 4 units.   For Pre-Meal  - 264 give 5 units.   For Pre-Meal  - 289 give 6 units.   For Pre-Meal  - 314 give 7 units.   For Pre-Meal  - 339 give 8 units.   For Pre-Meal  - 364 give 9 units.   For Pre-Meal BG greater than or equal to 365 give 10 units  To be given with prandial insulin, and based on pre-meal blood glucose.   Notify provider if glucose greater than or equal to 350 mg/dL after administration of correction dose. If given at mealtime, administer within 30 minutes of start of meal   insulin aspart (NOVOLOG PEN) 100 UNIT/ML pen  Self No No   Sig: Inject 1-7 Units Subcutaneous At Bedtime HIGH INSULIN RESISTANCE DOSING    Do Not give Bedtime Correction Insulin if BG less than 200.   For  - 224 give 1 units.   For  - 249 give 2 units.   For  - 274 give 3 units.   For  - 299 give 4 units.   For  - 324 give 5 units.   For  - 349 give 6 units.   For BG greater than or equal to 350 give 7 units.   Notify provider if glucose greater than or equal to 350 mg/dL after administration of correction dose. If given at mealtime, administer within 30 minutes of start of meal   insulin aspart (NOVOLOG PEN) 100 UNIT/ML pen   Yes No   Si unit / 8  gms CHO with meals,plus correction insulin. Pt uses approx 40 units in 24 hrs.   insulin glargine (LANTUS PEN) 100 UNIT/ML pen   Yes No   Sig: Inject 32 Units Subcutaneous every evening Inject 32 units subcutaneous daily.   magnesium oxide (MAG-OX) 400 (241.3 Mg) MG tablet   No No   Sig: Take 1 tablet (400 mg) by mouth 2 times daily   mycophenolate (GENERIC EQUIVALENT) 500 MG tablet   No No   Sig:  Take 2 tablets (1,000 mg) by mouth 2 times daily   rosuvastatin (CRESTOR) 10 MG tablet   No No   Sig: TAKE ONE TABLET BY MOUTH ONCE DAILY   tacrolimus (GENERIC EQUIVALENT) 1 MG capsule   No No   Sig: Take 3 capsules (3 mg) by mouth 2 times daily Take 3mg BID   valGANciclovir (VALCYTE) 450 MG tablet   No No   Sig: TAKE ONE TABLET (450MG) BY MOUTH DAILY      Facility-Administered Medications: None     Allergies   No Known Allergies    Physical Exam   Vital Signs: Temp: 97.8  F (36.6  C) Temp src: Oral BP: (!) 154/99 Pulse: 113   Resp: 16 SpO2: 96 % O2 Device: None (Room air)    Weight: 180 lbs 11.2 oz    Exam:  Constitutional: healthy, alert and no distress  Head, neck, ear: No pharyngeal exudates, right ear has no signs of infection, TM, reflective of light. JVD ~10-12 cm  Cardiovascular:  RRR. No murmurs, clicks gallops or rub  Respiratory: Clear to ausculation bilaterally. No crackles  Gastrointestinal: Abdomen soft, non-tender. BS normal. No masses, organomegaly  Extremities: No appreciable edema in the LE extremity  Skin: No  Rashes on exposed skin  Neurologic: No focal deficits. Mentating appropriately  Psychiatric: Appropriate for the setting      Data   Data reviewed today: I reviewed all medications, new labs and imaging results over the last 24 hours.

## 2020-08-31 NOTE — ED NOTES
Bed: ED09  Expected date: 8/31/20  Expected time:   Means of arrival:   Comments:  Mariusz Sharp [id number]  Being referred by heart ts for evaluation of fever, cough, and syncopal episode.  Hx:  Heart tx 3 months ao  ro 2 weeks ago rhionivirus 3 mo ago

## 2020-08-31 NOTE — ED NOTES
[facility], [place]   ED Nurse to Floor Handoff     Mariusz Sharp is a 59 year old male who speaks English and lives unknown,  in a home  They arrived in the ED by ambulance from home    ED Chief Complaint: Fever and Loss of Consciousness    ED Dx;   Final diagnoses:   Syncope, unspecified syncope type   Fever and chills         Needed?: No    Allergies: No Known Allergies.  Past Medical Hx:   Past Medical History:   Diagnosis Date     Acute kidney injury (H)      CKD (chronic kidney disease)      Coronary artery disease      Diabetes mellitus (H)      HTN (hypertension)      Hyperlipidemia      ICD (implantable cardioverter-defibrillator), single chamber- NOT dependent 7/17/2018     Ischemic cardiomyopathy      LV (left ventricular) mural thrombus      Paroxysmal atrial flutter (H)      RVF (right ventricular failure) (H)      Systolic heart failure (H)      Ventricular tachycardia (H)       Baseline Mental status: WDL  Current Mental Status changes: at basesline    Infection present or suspected this encounter: yes other Unknown  Sepsis suspected: Yes  Isolation type: Special Precautions-COVID-19     Activity level - Baseline/Home:  Independent  Activity Level - Current:   Stand with Assist    Bariatric equipment needed?: No    In the ED these meds were given:   Medications   lidocaine 1 % 0.1-1 mL (has no administration in time range)   lidocaine (LMX4) cream (has no administration in time range)   sodium chloride (PF) 0.9% PF flush 3 mL (has no administration in time range)   sodium chloride (PF) 0.9% PF flush 3 mL (has no administration in time range)   acetaminophen (TYLENOL) tablet 650 mg (has no administration in time range)   acetaminophen (TYLENOL) Suppository 650 mg (has no administration in time range)   Patient is already receiving mechanical prophylaxis (has no administration in time range)   Continuing ACE inhibitor/ARB/ARNI from home medication list OR ACE  inhibitor/ARB/ARNI order already placed during this visit (has no administration in time range)   Continuing beta blocker from home medication list OR beta blocker order already placed during this visit (has no administration in time range)   potassium chloride ER (KLOR-CON M) CR tablet 20-40 mEq (has no administration in time range)   potassium chloride (KLOR-CON) Packet 20-40 mEq (has no administration in time range)   potassium chloride 10 mEq in 100 mL sterile water intermittent infusion (premix) (has no administration in time range)   potassium chloride 10 mEq in 100 mL intermittent infusion with 10 mg lidocaine (has no administration in time range)   potassium chloride 20 mEq in 50 mL intermittent infusion (has no administration in time range)   magnesium sulfate 2 g in water intermittent infusion (has no administration in time range)   magnesium sulfate 4 g in 100 mL sterile water (premade) (has no administration in time range)   aspirin (ASA) chewable tablet 81 mg (has no administration in time range)   mycophenolate (CELLCEPT BRAND) tablet 1,500 mg (has no administration in time range)   rosuvastatin (CRESTOR) tablet 10 mg (has no administration in time range)   valGANciclovir (VALCYTE) tablet 450 mg (has no administration in time range)   tacrolimus (GENERIC EQUIVALENT) capsule 3 mg (has no administration in time range)   0.9% sodium chloride BOLUS (1,000 mLs Intravenous New Bag 8/31/20 1321)       Drips running?  Yes    Home pump  No    Current LDAs  Peripheral IV 08/31/20 Right (Active)   Site Assessment WDL 08/31/20 1258   Line Status Saline locked;Checked every 1-2 hour 08/31/20 1258   Number of days: 0       Rash 05/12/20 2308 back (Active)   Number of days: 111       Incision/Surgical Site 12/31/18 Left;Upper Chest (Active)   Number of days: 609       Incision/Surgical Site 05/18/20 Midline Chest (Active)   Number of days: 105       Incision/Surgical Site Left;Lateral Chest (Active)   Number of days:         Labs results:   Labs Ordered and Resulted from Time of ED Arrival Up to the Time of Departure from the ED   COMPREHENSIVE METABOLIC PANEL - Abnormal; Notable for the following components:       Result Value    Sodium 130 (*)     Glucose 186 (*)     Urea Nitrogen 33 (*)     Creatinine 1.60 (*)     GFR Estimate 47 (*)     GFR Estimate If Black 54 (*)     Albumin 3.1 (*)     All other components within normal limits   CBC WITH PLATELETS DIFFERENTIAL - Abnormal; Notable for the following components:    RBC Count 3.50 (*)     Hemoglobin 9.7 (*)     Hematocrit 31.3 (*)     MCHC 31.0 (*)     Absolute Lymphocytes 0.4 (*)     All other components within normal limits   COVID-19 VIRUS (CORONAVIRUS) BY PCR   LACTIC ACID WHOLE BLOOD   PROCALCITONIN   SARS-COV-2 (COVID-19) VIRUS RT-PCR   UA MACROSCOPIC WITH REFLEX TO MICRO AND CULTURE   ORTHOSTATIC BLOOD PRESSURE AND PULSE   NO VTE PROPHYLAXIS   NO VTE PROPHYLAXIS   IP ASSIGN PROVIDER TEAM TO TREATMENT TEAM   DAILY WEIGHTS   INTAKE AND OUTPUT   OBTAIN MEDICAL RECORDS   DOCUMENT   PATIENT EDUCATION   TELEMETRY MONITORING CARDIOLOGY ADULT   VITAL SIGNS AND PAIN ASSESSMENT   PULSE OXIMETRY NURSING   PERIPHERAL IV CATHETER   ACTIVITY   NOTIFY PROVIDER   IP ASSIGN PROVIDER TEAM TO TREATMENT TEAM   VITAL SIGNS   PULSE OXIMETRY NURSING   MEASURE HEIGHT AND WEIGHT   STRICT INTAKE AND OUTPUT   PATIENT TEACHING: HEART FAILURE   ACTIVITY   TRANSPORT PATIENT   BLOOD CULTURE   RESPIRATORY PANEL PCR - NP SWAB       Imaging Studies:   Recent Results (from the past 24 hour(s))   Head CT w/o contrast    Narrative    CT HEAD W/O CONTRAST 8/31/2020 2:43 PM    History: Head trauma, minor, GCS>=13, low clinical risk, initial exam     Comparison: 1/17/2019    Technique: Using multidetector thin collimation helical acquisition  technique, axial, coronal and sagittal CT images from the skull base  to the vertex were obtained without intravenous contrast.    Findings: There is no intracranial  "hemorrhage, mass effect, or midline  shift. Gray/white matter differentiation in both cerebral hemispheres  is preserved. Mild cerebral volume loss. Ventricles are proportionate  to the cerebral sulci. The basal cisterns are clear. Atherosclerotic  calcification at the carotid siphons.    The bony calvaria and the bones of the skull base are normal. The  visualized portions of the paranasal sinuses and mastoid air cells are  clear.       Impression    Impression:  No acute intracranial pathology.     I have personally reviewed the examination and initial interpretation  and I agree with the findings.    EBONI REDMAN MD       Recent vital signs:   /78   Pulse 115   Temp 97.8  F (36.6  C) (Oral)   Resp 14   Ht 1.626 m (5' 4\")   Wt 82 kg (180 lb 11.2 oz)   SpO2 93%   BMI 31.02 kg/m      [place] Coma Scale Score: 15 (08/31/20 1315)       Cardiac Rhythm: Normal Sinus  Pt needs tele? Yes  Skin/wound Issues: Unknown    Code Status: Full Code    Pain control: pt had none    Nausea control: pt had none    Abnormal labs/tests/findings requiring intervention: See EMR    Family present during ED course? No   Family Comments/Social Situation comments:     Tasks needing completion: None    Eros Montes, RN  asc -- [phone] [facility]  [phone] [facility]      "

## 2020-08-31 NOTE — ED TRIAGE NOTES
Patient presents ambulatory to triage with c/o fever and syncopal episode. Patient reports fever with temp max of 101.3 last night, SOB and cough for about the past two weeks, and syncopal episode last night. Patient reports hitting his head last night when he passed out after coughing spell. Denies chest pain. Patient is not on a blood thinner. Temp 97.8 during triage, took tylenol around 0600.

## 2020-08-31 NOTE — TELEPHONE ENCOUNTER
Pt called this AM to report fever of 101.3 and cough.  Pt states he had a syncopal episode in the bathroom due to coughing so hard.  Pt states he is not sure if he hit his head.  Dr. Ortiz notified.  [facility]- brother will drive him down.  Report called to ER.  Will notify inpatient team.

## 2020-09-01 ENCOUNTER — APPOINTMENT (OUTPATIENT)
Dept: OCCUPATIONAL THERAPY | Facility: CLINIC | Age: 60
End: 2020-09-01
Attending: STUDENT IN AN ORGANIZED HEALTH CARE EDUCATION/TRAINING PROGRAM
Payer: COMMERCIAL

## 2020-09-01 ENCOUNTER — APPOINTMENT (OUTPATIENT)
Dept: GENERAL RADIOLOGY | Facility: CLINIC | Age: 60
End: 2020-09-01
Attending: STUDENT IN AN ORGANIZED HEALTH CARE EDUCATION/TRAINING PROGRAM
Payer: COMMERCIAL

## 2020-09-01 LAB
ALBUMIN SERPL-MCNC: 2.4 G/DL (ref 3.4–5)
ALP SERPL-CCNC: 62 U/L (ref 40–150)
ALT SERPL W P-5'-P-CCNC: 11 U/L (ref 0–70)
ANION GAP SERPL CALCULATED.3IONS-SCNC: 9 MMOL/L (ref 3–14)
ANISOCYTOSIS BLD QL SMEAR: SLIGHT
AST SERPL W P-5'-P-CCNC: 16 U/L (ref 0–45)
BACTERIA SPEC CULT: ABNORMAL
BASOPHILS # BLD AUTO: 0 10E9/L (ref 0–0.2)
BASOPHILS NFR BLD AUTO: 0 %
BILIRUB SERPL-MCNC: 0.9 MG/DL (ref 0.2–1.3)
BUN SERPL-MCNC: 21 MG/DL (ref 7–30)
BURR CELLS BLD QL SMEAR: SLIGHT
C DIFF TOX B STL QL: NEGATIVE
C PNEUM DNA SPEC QL NAA+PROBE: NOT DETECTED
CALCIUM SERPL-MCNC: 8.5 MG/DL (ref 8.5–10.1)
CHLORIDE SERPL-SCNC: 104 MMOL/L (ref 94–109)
CHOLEST SERPL-MCNC: 113 MG/DL
CO2 SERPL-SCNC: 22 MMOL/L (ref 20–32)
CREAT SERPL-MCNC: 1.38 MG/DL (ref 0.66–1.25)
CRYPTOC AG SPEC QL: NORMAL
DIFFERENTIAL METHOD BLD: ABNORMAL
ELLIPTOCYTES BLD QL SMEAR: SLIGHT
EOSINOPHIL # BLD AUTO: 0 10E9/L (ref 0–0.7)
EOSINOPHIL NFR BLD AUTO: 0.9 %
ERYTHROCYTE [DISTWIDTH] IN BLOOD BY AUTOMATED COUNT: 14.5 % (ref 10–15)
FLUAV H1 2009 PAND RNA SPEC QL NAA+PROBE: NOT DETECTED
FLUAV H1 RNA SPEC QL NAA+PROBE: NOT DETECTED
FLUAV H3 RNA SPEC QL NAA+PROBE: NOT DETECTED
FLUAV RNA SPEC QL NAA+PROBE: NOT DETECTED
FLUBV RNA SPEC QL NAA+PROBE: NOT DETECTED
GFR SERPL CREATININE-BSD FRML MDRD: 56 ML/MIN/{1.73_M2}
GLUCOSE BLDC GLUCOMTR-MCNC: 116 MG/DL (ref 70–99)
GLUCOSE BLDC GLUCOMTR-MCNC: 116 MG/DL (ref 70–99)
GLUCOSE BLDC GLUCOMTR-MCNC: 134 MG/DL (ref 70–99)
GLUCOSE BLDC GLUCOMTR-MCNC: 152 MG/DL (ref 70–99)
GLUCOSE BLDC GLUCOMTR-MCNC: 172 MG/DL (ref 70–99)
GLUCOSE BLDC GLUCOMTR-MCNC: 175 MG/DL (ref 70–99)
GLUCOSE SERPL-MCNC: 118 MG/DL (ref 70–99)
GRAM STN SPEC: ABNORMAL
HADV DNA SPEC QL NAA+PROBE: NOT DETECTED
HCOV PNL SPEC NAA+PROBE: NOT DETECTED
HCT VFR BLD AUTO: 26 % (ref 40–53)
HDLC SERPL-MCNC: 28 MG/DL
HGB BLD-MCNC: 7.9 G/DL (ref 13.3–17.7)
HMPV RNA SPEC QL NAA+PROBE: NOT DETECTED
HPIV1 RNA SPEC QL NAA+PROBE: NOT DETECTED
HPIV2 RNA SPEC QL NAA+PROBE: NOT DETECTED
HPIV3 RNA SPEC QL NAA+PROBE: NOT DETECTED
HPIV4 RNA SPEC QL NAA+PROBE: NOT DETECTED
L PNEUMO1 AG UR QL IA: NORMAL
LACTATE BLD-SCNC: 0.9 MMOL/L (ref 0.7–2)
LDH SERPL L TO P-CCNC: 228 U/L (ref 85–227)
LDLC SERPL CALC-MCNC: 57 MG/DL
LYMPHOCYTES # BLD AUTO: 0.5 10E9/L (ref 0.8–5.3)
LYMPHOCYTES NFR BLD AUTO: 14.2 %
Lab: ABNORMAL
M PNEUMO DNA SPEC QL NAA+PROBE: NOT DETECTED
MAGNESIUM SERPL-MCNC: 1.4 MG/DL (ref 1.6–2.3)
MCH RBC QN AUTO: 27.1 PG (ref 26.5–33)
MCHC RBC AUTO-ENTMCNC: 30.4 G/DL (ref 31.5–36.5)
MCV RBC AUTO: 89 FL (ref 78–100)
MICROBIOLOGIST REVIEW: NORMAL
MICROCYTES BLD QL SMEAR: PRESENT
MONOCYTES # BLD AUTO: 0.3 10E9/L (ref 0–1.3)
MONOCYTES NFR BLD AUTO: 8.8 %
NEUTROPHILS # BLD AUTO: 2.6 10E9/L (ref 1.6–8.3)
NEUTROPHILS NFR BLD AUTO: 76.1 %
NONHDLC SERPL-MCNC: 86 MG/DL
PLATELET # BLD AUTO: 336 10E9/L (ref 150–450)
PLATELET # BLD EST: ABNORMAL 10*3/UL
POIKILOCYTOSIS BLD QL SMEAR: SLIGHT
POLYCHROMASIA BLD QL SMEAR: SLIGHT
POTASSIUM SERPL-SCNC: 3.8 MMOL/L (ref 3.4–5.3)
PROT SERPL-MCNC: 6.1 G/DL (ref 6.8–8.8)
RBC # BLD AUTO: 2.91 10E12/L (ref 4.4–5.9)
RBC INCLUSIONS BLD: SLIGHT
RSV RNA SPEC QL NAA+PROBE: NOT DETECTED
RSV RNA SPEC QL NAA+PROBE: NOT DETECTED
RV+EV RNA SPEC QL NAA+PROBE: NOT DETECTED
S PNEUM AG SPEC QL: NORMAL
SODIUM SERPL-SCNC: 134 MMOL/L (ref 133–144)
SPECIMEN SOURCE: ABNORMAL
SPECIMEN SOURCE: ABNORMAL
SPECIMEN SOURCE: NORMAL
STREP GROUP A PCR: NOT DETECTED
TACROLIMUS BLD-MCNC: 6.2 UG/L (ref 5–15)
TME LAST DOSE: NORMAL H
TRIGL SERPL-MCNC: 143 MG/DL
WBC # BLD AUTO: 3.4 10E9/L (ref 4–11)

## 2020-09-01 PROCEDURE — 36415 COLL VENOUS BLD VENIPUNCTURE: CPT | Performed by: STUDENT IN AN ORGANIZED HEALTH CARE EDUCATION/TRAINING PROGRAM

## 2020-09-01 PROCEDURE — 25000128 H RX IP 250 OP 636: Performed by: STUDENT IN AN ORGANIZED HEALTH CARE EDUCATION/TRAINING PROGRAM

## 2020-09-01 PROCEDURE — 25000131 ZZH RX MED GY IP 250 OP 636 PS 637: Performed by: STUDENT IN AN ORGANIZED HEALTH CARE EDUCATION/TRAINING PROGRAM

## 2020-09-01 PROCEDURE — 87899 AGENT NOS ASSAY W/OPTIC: CPT | Performed by: STUDENT IN AN ORGANIZED HEALTH CARE EDUCATION/TRAINING PROGRAM

## 2020-09-01 PROCEDURE — 80197 ASSAY OF TACROLIMUS: CPT | Performed by: STUDENT IN AN ORGANIZED HEALTH CARE EDUCATION/TRAINING PROGRAM

## 2020-09-01 PROCEDURE — 25000132 ZZH RX MED GY IP 250 OP 250 PS 637: Performed by: NURSE PRACTITIONER

## 2020-09-01 PROCEDURE — 21400000 ZZH R&B CCU UMMC

## 2020-09-01 PROCEDURE — 85025 COMPLETE CBC W/AUTO DIFF WBC: CPT | Performed by: STUDENT IN AN ORGANIZED HEALTH CARE EDUCATION/TRAINING PROGRAM

## 2020-09-01 PROCEDURE — 25000132 ZZH RX MED GY IP 250 OP 250 PS 637: Performed by: STUDENT IN AN ORGANIZED HEALTH CARE EDUCATION/TRAINING PROGRAM

## 2020-09-01 PROCEDURE — 97535 SELF CARE MNGMENT TRAINING: CPT | Mod: GO

## 2020-09-01 PROCEDURE — 83615 LACTATE (LD) (LDH) ENZYME: CPT | Performed by: STUDENT IN AN ORGANIZED HEALTH CARE EDUCATION/TRAINING PROGRAM

## 2020-09-01 PROCEDURE — 86777 TOXOPLASMA ANTIBODY: CPT | Performed by: STUDENT IN AN ORGANIZED HEALTH CARE EDUCATION/TRAINING PROGRAM

## 2020-09-01 PROCEDURE — 99233 SBSQ HOSP IP/OBS HIGH 50: CPT | Performed by: NURSE PRACTITIONER

## 2020-09-01 PROCEDURE — 87799 DETECT AGENT NOS DNA QUANT: CPT | Performed by: STUDENT IN AN ORGANIZED HEALTH CARE EDUCATION/TRAINING PROGRAM

## 2020-09-01 PROCEDURE — 80061 LIPID PANEL: CPT | Performed by: STUDENT IN AN ORGANIZED HEALTH CARE EDUCATION/TRAINING PROGRAM

## 2020-09-01 PROCEDURE — 80053 COMPREHEN METABOLIC PANEL: CPT | Performed by: STUDENT IN AN ORGANIZED HEALTH CARE EDUCATION/TRAINING PROGRAM

## 2020-09-01 PROCEDURE — 83605 ASSAY OF LACTIC ACID: CPT | Performed by: STUDENT IN AN ORGANIZED HEALTH CARE EDUCATION/TRAINING PROGRAM

## 2020-09-01 PROCEDURE — 00000146 ZZHCL STATISTIC GLUCOSE BY METER IP

## 2020-09-01 PROCEDURE — 86778 TOXOPLASMA ANTIBODY IGM: CPT | Performed by: STUDENT IN AN ORGANIZED HEALTH CARE EDUCATION/TRAINING PROGRAM

## 2020-09-01 PROCEDURE — 87493 C DIFF AMPLIFIED PROBE: CPT | Performed by: STUDENT IN AN ORGANIZED HEALTH CARE EDUCATION/TRAINING PROGRAM

## 2020-09-01 PROCEDURE — 71045 X-RAY EXAM CHEST 1 VIEW: CPT

## 2020-09-01 PROCEDURE — 97165 OT EVAL LOW COMPLEX 30 MIN: CPT | Mod: GO

## 2020-09-01 PROCEDURE — 83735 ASSAY OF MAGNESIUM: CPT | Performed by: STUDENT IN AN ORGANIZED HEALTH CARE EDUCATION/TRAINING PROGRAM

## 2020-09-01 RX ORDER — CEFDINIR 300 MG/1
300 CAPSULE ORAL EVERY 12 HOURS SCHEDULED
Status: DISCONTINUED | OUTPATIENT
Start: 2020-09-01 | End: 2020-09-13

## 2020-09-01 RX ORDER — ALBUTEROL SULFATE 0.83 MG/ML
2.5 SOLUTION RESPIRATORY (INHALATION) EVERY 6 HOURS PRN
Status: DISCONTINUED | OUTPATIENT
Start: 2020-09-01 | End: 2020-09-24 | Stop reason: HOSPADM

## 2020-09-01 RX ADMIN — ROSUVASTATIN CALCIUM 10 MG: 10 TABLET, FILM COATED ORAL at 19:21

## 2020-09-01 RX ADMIN — PIPERACILLIN AND TAZOBACTAM 4.5 G: 4; .5 INJECTION, POWDER, FOR SOLUTION INTRAVENOUS at 08:45

## 2020-09-01 RX ADMIN — ASPIRIN 81 MG CHEWABLE TABLET 81 MG: 81 TABLET CHEWABLE at 08:46

## 2020-09-01 RX ADMIN — NYSTATIN 500000 UNITS: 500000 SUSPENSION ORAL at 11:38

## 2020-09-01 RX ADMIN — VALGANCICLOVIR 450 MG: 450 TABLET, FILM COATED ORAL at 08:46

## 2020-09-01 RX ADMIN — MYCOPHENOLATE MOFETIL 1000 MG: 500 TABLET ORAL at 08:46

## 2020-09-01 RX ADMIN — TACROLIMUS 3 MG: 1 CAPSULE ORAL at 18:21

## 2020-09-01 RX ADMIN — NYSTATIN 500000 UNITS: 500000 SUSPENSION ORAL at 19:21

## 2020-09-01 RX ADMIN — TACROLIMUS 3 MG: 1 CAPSULE ORAL at 08:46

## 2020-09-01 RX ADMIN — Medication 5000 UNITS: at 11:38

## 2020-09-01 RX ADMIN — INSULIN ASPART 5 UNITS: 100 INJECTION, SOLUTION INTRAVENOUS; SUBCUTANEOUS at 09:30

## 2020-09-01 RX ADMIN — Medication 1 TABLET: at 08:47

## 2020-09-01 RX ADMIN — INSULIN GLARGINE 20 UNITS: 100 INJECTION, SOLUTION SUBCUTANEOUS at 22:13

## 2020-09-01 RX ADMIN — PIPERACILLIN AND TAZOBACTAM 4.5 G: 4; .5 INJECTION, POWDER, FOR SOLUTION INTRAVENOUS at 02:21

## 2020-09-01 RX ADMIN — MAGNESIUM SULFATE IN WATER 4 G: 40 INJECTION, SOLUTION INTRAVENOUS at 09:32

## 2020-09-01 RX ADMIN — Medication 5000 UNITS: at 22:39

## 2020-09-01 RX ADMIN — NYSTATIN 500000 UNITS: 500000 SUSPENSION ORAL at 08:47

## 2020-09-01 RX ADMIN — MYCOPHENOLATE MOFETIL 1000 MG: 500 TABLET ORAL at 18:21

## 2020-09-01 RX ADMIN — Medication 12.5 MG: at 14:46

## 2020-09-01 RX ADMIN — Medication 1 TABLET: at 18:21

## 2020-09-01 RX ADMIN — POTASSIUM CHLORIDE 20 MEQ: 750 TABLET, EXTENDED RELEASE ORAL at 09:15

## 2020-09-01 RX ADMIN — CEFDINIR 300 MG: 300 CAPSULE ORAL at 19:21

## 2020-09-01 RX ADMIN — NYSTATIN 500000 UNITS: 500000 SUSPENSION ORAL at 17:09

## 2020-09-01 RX ADMIN — HYDRALAZINE HYDROCHLORIDE 25 MG: 25 TABLET, FILM COATED ORAL at 08:47

## 2020-09-01 RX ADMIN — Medication 12.5 MG: at 19:21

## 2020-09-01 RX ADMIN — PANTOPRAZOLE SODIUM 40 MG: 40 TABLET, DELAYED RELEASE ORAL at 08:47

## 2020-09-01 ASSESSMENT — ACTIVITIES OF DAILY LIVING (ADL)
ADLS_ACUITY_SCORE: 14
ADLS_ACUITY_SCORE: 13
PREVIOUS_RESPONSIBILITIES: MEAL PREP;HOUSEKEEPING;LAUNDRY;SHOPPING;MEDICATION MANAGEMENT;FINANCES;DRIVING

## 2020-09-01 NOTE — PROGRESS NOTES
[facility]   Cardiology II Service / Advanced Heart Failure  Daily Progress Note  Date of Service: 9/1/2020      Patient: Mariusz Sharp  MRN: [id number]  Admission Date: 8/31/2020  Hospital Day # 1    Assessment and Plan: Mariusz Sharp is a 59 year old male with PMH of CAD, LV thrombus, CKD Stage III, PAF, DM Type II, and ICM s/p HM III LVAD as BTT with subsequent OHT 5/18/20. He presents with progressive SOB, cough, and fever.     Fever with Cough. Tmax 102 prior to admission. History of rhinovirus. CDIF, LA, UA, COVID, Cryptococcus, and Strep pneumo negative. Renal US notes resolution of prior stone. RVP negative. History of PAcnes to outflow graft and Donor positive S Anginosus and Veillonella (completed course of antibiotics). Concern for Chronic Prostatitis.   - Discontinue Vanco and Zosyn given clear Chest X-ray this AM.   - Appreciate Transplant ID Consult.   - Sputum cultures pending.   -  Aspergillus, Legionella, CMV, EBV, and Beta D glucan pending.   - Blood cultures NTD.   - Aggressive pulmonary toileting and nebs.   - Continue Cefdinir 300 mg po BID through 10/1/20.    Syncope. Occurred with cough prior to admission.   - Encourage fluids.   - Aggressive pulmonary toileting and nebs.     Diarrhea, resolved.   - CDIF negative and CMV pending.     S/p OHT secondary to ICM. 5/18/20. Echo 8/31 with EF 70% and normal graft function.   RHC 8/26 with mRA-3, RV-20/2, mPA-15, mPCWP-10, AMRIT CO-4.91, AMRIT CI-2.63, biopsy negative.   Immunosuppression: Simulect 5/18 and 5/22. Tac 3 mg po BID (note stopped clotrimazole troches). Tac level low at 6.2, repeat in AM due to innacurate trough. Goal-8-10. Cellcept 1000 mg po BID, and Prednisone completed 8/27/20.  Serostatus: CMV: D+, R-, EBV: D- R+, Toxo D- R-  Prophylaxis: Valcyte. Bactrim discontinued due to hyperkalemia. Pentamidine neb last administered 7/29.    - Continue ASA and Crestor.     HTN.   - Prior to admission Norvasc on  Hold.   - BP  "107/78 this AM.   - Hydralazine decreased to 12.5 mg po TID.     DM Type II.   - Lantus 20 units at HS.   - Novolog preprandial with medium intensity sliding scale insulin.      Elevated PSA with Concern for Chronic Prostatitis. PSA-8.21. MRI consistent with BPH.   - Continue Cefdinir 300 mg po BID through 10/1/20.  ================================================================  Interval History/ROS: He complains of persistent productive cough with mild dizziness. He notes occasional hot flashes overnight. He denies fever, chest pain, palpitations, cough, nausea, vomiting, diarrhea, and LE edema. He is tolerating liquids.     Last 24 hr care team notes reviewed.   ROS:  4 point ROS including Respiratory, CV, GI and , other than that noted in the HPI, is negative.     Medications: Reviewed in EPIC.     Physical Exam:   /78   Pulse 105   Temp 98.9  F (37.2  C) (Oral)   Resp 20   Ht 1.626 m (5' 4\")   Wt 79.9 kg (176 lb 3.2 oz)   SpO2 93%   BMI 30.24 kg/m    GENERAL: Appears alert and oriented times three.   HEENT: Eye symmetrical and free of discharge bilaterally. Mucous membranes moist and without lesions.  NECK: Supple and without lymphadenopathy. JVD below clavicular line upright.    CV: RRR, S1S2 present without murmur, rub, or gallop.   RESPIRATORY: Respirations regular, even, and unlabored. Lungs CTA throughout.   GI: Soft and non distended with normoactive bowel sounds present in all quadrants. No tenderness, rebound, guarding. No organomegaly.   EXTREMITIES: No peripheral edema. 2+ bilateral pedal pulses.   NEUROLOGIC: Alert and orientated x 3. CN II-XII grossly intact. No focal deficits.   MUSCULOSKELETAL: No joint swelling or tenderness.   SKIN: No jaundice. No rashes or lesions.     Data:  CMP  Recent Labs   Lab 09/01/20  0658 08/31/20  1259 08/26/20  0939    130* 137   POTASSIUM 3.8 4.0 4.1   CHLORIDE 104 100 105   CO2 22 22 25   ANIONGAP 9 8 7   * 186* 164*   BUN 21 33* 41* "   CR 1.38* 1.60* 1.91*   GFRESTIMATED 56* 47* 38*   GFRESTBLACK 65 54* 44*   ARLENE 8.5 9.6 9.3   MAG 1.4*  --  1.7   PHOS  --   --  3.0   PROTTOTAL 6.1* 7.3 6.8   ALBUMIN 2.4* 3.1* 3.1*   BILITOTAL 0.9 0.8 0.5   ALKPHOS 62 76 73   AST 16 21 20   ALT 11 14 19     CBC  Recent Labs   Lab 09/01/20  0658 08/31/20  1259 08/26/20  0939 08/26/20  0938   WBC 3.4* 4.2 Canceled, Test credited 3.5*   RBC 2.91* 3.50* Canceled, Test credited 3.23*   HGB 7.9* 9.7* Canceled, Test credited 9.0*   HCT 26.0* 31.3* Canceled, Test credited 29.0*   MCV 89 89 Canceled, Test credited 90   MCH 27.1 27.7 Canceled, Test credited 27.9   MCHC 30.4* 31.0* Canceled, Test credited 31.0*   RDW 14.5 14.6 Canceled, Test credited 15.9*    374 Canceled, Test credited 315     INR  Recent Labs   Lab 08/26/20  0939   INR 1.06       Patient discussed with Dr. Ortiz.      Antonia Byrne [facility]  9/1/2020

## 2020-09-01 NOTE — PLAN OF CARE
OT 6C  Discharge Planner OT   Patient plan for discharge: Return home  Current status: SBA for functional mobility. SBA g/h sitting and standing at sink. SBA toileting and pericares. Pt limited by SOB.  Barriers to return to prior living situation: acute medical needs, decreased activity tolerance, decreased IND with ADLs  Recommendations for discharge: Home with assist  Rationale for recommendations: Pt is below baseline and would benefit from continued skilled therapy to increase activity tolerance and IND with I/ADLs.       Entered by: Madelyn Miranda 09/01/2020 10:50 AM

## 2020-09-01 NOTE — PROGRESS NOTES
"   09/01/20 1000   Quick Adds   Type of Visit Initial Occupational Therapy Evaluation   Living Environment   Lives With parent(s)   Living Arrangements house   Home Accessibility stairs to enter home;stairs within home   Number of Stairs, Main Entrance 5   Number of Stairs, Within Home, Primary other (see comments)  (flight to second floor, flight to basement)   Living Environment Comment Pt lives in a house with his father who is largely IND.   Self-Care   Usual Activity Tolerance good   Current Activity Tolerance fair   Regular Exercise Yes   Activity/Exercise Type walking   Exercise Amount/Frequency 3-5 times/wk   Equipment Currently Used at Home none   Activity/Exercise/Self-Care Comment Pt is IND with I/ADLs and assists his father with meal prep and med mgmt.   Functional Level   Ambulation 0-->independent   Transferring 0-->independent   Toileting 0-->independent   Bathing 0-->independent   Dressing 0-->independent   Eating 0-->independent   Communication 0-->understands/communicates without difficulty   Cognition 0 - no cognition issues reported   Fall history within last six months no   Which of the above functional risks had a recent onset or change? ambulation;bathing;dressing;toileting;transferring   Prior Functional Level Comment Pt IND at baseline.       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   General Info Comments Activity orders: ambulate pt every shift, as tolerated.   Cognitive Status Examination   Orientation orientation to person, place and time   Level of Consciousness alert   Follows Commands (Cognition) WNL   Memory intact   Attention No deficits were identified   Organization/Problem Solving No deficits were identified   Executive Function No deficits were identified   Cognitive Comment Pt alert and oriented x4.   Visual Perception   Visual Perception No deficits were identified;Wears glasses   Sensory Examination   Sensory Comments Pt reports tingling on R side of face.   Integumentary/Edema   Integumentary/Edema no deficits were identifed   Range of Motion (ROM)   ROM Quick Adds No deficits were identified   Strength   Manual Muscle Testing Quick Adds No deficits were identified   Hand Strength   Hand Strength Comments WFL   Muscle Tone Assessment   Muscle Tone Quick Adds No deficits were identified   Coordination   Upper Extremity Coordination No deficits were identified   Lower Body Dressing   Level of [place]: Dress Lower Body stand-by assist   Toileting   Level of [place]: Toilet stand-by assist   Grooming   Level of [place]: Grooming contact guard   Instrumental Activities of Daily Living (IADL)   Previous Responsibilities meal prep;housekeeping;laundry;shopping;medication management;finances;driving   IADL Comments Pt IND at baseline.   Activities of Daily Living Analysis   Impairments Contributing to Impaired Activities of Daily Living strength decreased   ADL Comments Pt IND at  "baseline.   General Therapy Interventions   Planned Therapy Interventions ADL retraining;IADL retraining;strengthening;home program guidelines;progressive activity/exercise   Clinical Impression   Criteria for Skilled Therapeutic Interventions Met yes, treatment indicated   OT Diagnosis decreased activity tolerance, decreased IND with I/ADLs   Influenced by the following impairments SOB, fatigue, deconditioning   Assessment of Occupational Performance 5 or more Performance Deficits   Identified Performance Deficits dressing, bathing, toileting, ambulation, home mgmt   Clinical Decision Making (Complexity) Low complexity   Therapy Frequency 6x/week   Predicted Duration of Therapy Intervention (days/wks) 2 weeks   Anticipated Equipment Needs at Discharge   (TBD)   Anticipated Discharge Disposition Home with Assist   Risks and Benefits of Treatment have been explained. Yes   Patient, Family & other staff in agreement with plan of care Yes   [facility] TM \"6 Clicks\"   2016, Trustees of [facility], under license to [employer].  All rights reserved.   6 Clicks Short Forms Daily Activity Inpatient Short Form   [facility]  \"6 Clicks\" Daily Activity Inpatient Short Form   1. Putting on and taking off regular lower body clothing? 3 - A Little   2. Bathing (including washing, rinsing, drying)? 3 - A Little   3. Toileting, which includes using toilet, bedpan or urinal? 3 - A Little   4. Putting on and taking off regular upper body clothing? 3 - A Little   5. Taking care of personal grooming such as brushing teeth? 3 - A Little   6. Eating meals? 4 - None   Daily Activity Raw Score (Score out of 24.Lower scores equate to lower levels of function) 19   Total Evaluation Time   Total Evaluation Time (Minutes) 10     "

## 2020-09-01 NOTE — PHARMACY-ADMISSION MEDICATION HISTORY
Admission medication history interview status for the 8/31/2020 admission is complete. See Epic admission navigator for allergy information, pharmacy, prior to admission medications and immunization status.     Medication history interview sources:  Chart review, transplant pharmacy medication review 8/28    Changes made to PTA medication list (reason)  Added: none  Deleted: none  Changed: none    Additional medication history information (including reliability of information, actions taken by pharmacist): MMF was decreased from 1500mg to 1000mg on 8/18 admission due to leukopenia      Prior to Admission medications    Medication Sig Last Dose Taking? Auth Provider   acetaminophen (TYLENOL) 325 MG tablet Take 2 tablets (650 mg) by mouth every 4 hours as needed for other (multimodal surgical pain management along with NSAIDS and opioid medication as indicated based on pain control and physical function.) 8/31/2020 at Unknown time Yes Damari Ohara APRN CNP   amLODIPine (NORVASC) 5 MG tablet Take 2 tablets (10 mg) by mouth At Bedtime 8/30/2020 at Unknown time Yes Jenni Ortiz MD   aspirin (ASA) 81 MG chewable tablet Take 1 tablet (81 mg) by mouth daily 8/31/2020 at Unknown time Yes Jenni Ortiz MD   calcium carbonate 600 mg-vitamin D 400 units (CALTRATE) 600-400 MG-UNIT per tablet Take 1 tablet by mouth 2 times daily (with meals) 8/31/2020 at Unknown time Yes Damari Ohara APRN CNP   cefdinir (OMNICEF) 300 MG capsule Take 1 capsule (300 mg) by mouth 2 times daily 8/31/2020 at Unknown time Yes Samuel Geller MD   Continuous Blood Gluc Transmit (DEXCOM G6 TRANSMITTER) MISC 1 each every 3 months 8/31/2020 at Unknown time Yes Jeannette Schafer PA-C   hydrALAZINE (APRESOLINE) 25 MG tablet Take 1 tablet (25 mg) by mouth 3 times daily 8/31/2020 at Unknown time Yes Jenni Ortiz MD   insulin aspart (NOVOLOG PEN) 100 UNIT/ML pen 1 unit / 8  gms CHO with meals,plus correction insulin.  Pt uses approx 40 units in 24 hrs. 8/31/2020 at Unknown time Yes Jeannette Schafer PA-C   insulin aspart (NOVOLOG PEN) 100 UNIT/ML pen Correction Scale - HIGH INSULIN RESISTANCE DOSING     Do Not give Correction Insulin if Pre-Meal BG less than 140.   For Pre-Meal  - 164 give 1 unit.   For Pre-Meal  - 189 give 2 units.   For Pre-Meal  - 214 give 3 units.   For Pre-Meal  - 239 give 4 units.   For Pre-Meal  - 264 give 5 units.   For Pre-Meal  - 289 give 6 units.   For Pre-Meal  - 314 give 7 units.   For Pre-Meal  - 339 give 8 units.   For Pre-Meal  - 364 give 9 units.   For Pre-Meal BG greater than or equal to 365 give 10 units  To be given with prandial insulin, and based on pre-meal blood glucose.   Notify provider if glucose greater than or equal to 350 mg/dL after administration of correction dose. If given at mealtime, administer within 30 minutes of start of meal 8/31/2020 at Unknown time Yes Damari Ohara APRN CNP   insulin aspart (NOVOLOG PEN) 100 UNIT/ML pen Inject 1-7 Units Subcutaneous At Bedtime HIGH INSULIN RESISTANCE DOSING    Do Not give Bedtime Correction Insulin if BG less than 200.   For  - 224 give 1 units.   For  - 249 give 2 units.   For  - 274 give 3 units.   For  - 299 give 4 units.   For  - 324 give 5 units.   For  - 349 give 6 units.   For BG greater than or equal to 350 give 7 units.   Notify provider if glucose greater than or equal to 350 mg/dL after administration of correction dose. If given at mealtime, administer within 30 minutes of start of meal 8/31/2020 at Unknown time Yes Damari Ohara APRN CNP   insulin glargine (LANTUS PEN) 100 UNIT/ML pen Inject 32 Units Subcutaneous every evening Inject 32 units subcutaneous daily. 8/31/2020 at Unknown time Yes Jeannette Schafer PA-C   magnesium oxide (MAG-OX) 400 (241.3 Mg) MG tablet Take 1 tablet (400 mg) by mouth 2 times daily 8/31/2020 at  Unknown time Yes Marcy Harper APRN CNP   mycophenolate (GENERIC EQUIVALENT) 500 MG tablet Take 2 tablets (1,000 mg) by mouth 2 times daily 8/31/2020 at Unknown time Yes Samuel Geller MD   rosuvastatin (CRESTOR) 10 MG tablet TAKE ONE TABLET BY MOUTH ONCE DAILY 8/30/2020 at Unknown time Yes Jenni Ortiz MD   tacrolimus (GENERIC EQUIVALENT) 1 MG capsule Take 3 capsules (3 mg) by mouth 2 times daily Take 3mg BID 8/31/2020 at Unknown time Yes Samuel Geller MD   valGANciclovir (VALCYTE) 450 MG tablet TAKE ONE TABLET (450MG) BY MOUTH DAILY 8/31/2020 at Unknown time Yes Jenni Ortiz MD   ACCU-CHEK [place] PLUS test strip Check BG 3 times daily at meals and at bedtime.   Jenni Ortiz MD   BD SILVANA U/F 32G X 4 MM insulin pen needle Use 3-4 daily.   Jeannette Schafer PA-C   Continuous Blood Gluc  (DEXCOM G6 ) DON 1 each daily   Jeannette Schafer PA-C   Continuous Blood Gluc Sensor (DEXCOM G6 SENSOR) MISC 1 each every 10 days   Jeannette Schafer PA-C         Medication history completed by: Carol Youssef, LucretiaD, BCPS

## 2020-09-01 NOTE — PROGRESS NOTES
Post Transplant Patient Social Work Assessment     Patient Name: Mariusz Sharp  : 1960  Age: 59 year old  MRN: [id number]  Date of transplant: 2020    Patient known to me from follow up in the transplant program.  Admitted on 2020 for Infection.  Seen today to update assessment.      Presenting Information   Living Situation: Lives with 83 year-old Father in Father's home in [place], [place]  Functional Status: Independent with ADLs. Had caregiver for first 30 days post-heart locally before returning home to [place]. Has been dealing with a virus of some kind and hasn't been feeling well  Cultural/Language/Spiritual Considerations: None    Support System  Primary Support Person Brother  Other support:  Dad  Plan for support in immediate post-hospital period: Back home to [place] when medically stable    Health Care Directive  Decision Maker: Patient  Alternate Decision Maker: Brother and then Adult Son  Health Care Directive: Copy in Chart    Mental Health/Coping:   History of Mental Health: No history of MH issues  History of Chemical Health: No history of CD issues  Current status: N/A  Coping: Good coping skills, but verbalizes frustration with how poorly he has felt over the last 3-4 weeks  Services Needed/Recommended: None identified    Financial   Income: Social Security  Impact of transplant on income: No impact  Insurance and medication coverage: [employer]  Financial concerns: None  Resources needed: None    Discharge Plan   Patient and family discharge goal: Home when medically stable  Barriers to discharge: Medical condition    Education provided by CM: Social Work role inpatient setting, availability of support groups    Assessment and recommendations and plan:  CM will continue to follow [facility] during this hospitalization for ongoing psychosocial support as needed and assistance with discharge planning as needed.

## 2020-09-01 NOTE — UTILIZATION REVIEW
Admission Status; Secondary Review Determination    Under the authority of the Utilization Management Committee, the utilization review process indicated a secondary review on the above patient. The review outcome is based on review of the medical records, discussions with staff, and applying clinical experience noted on the date of the review.    (x) Inpatient Status Appropriate - This patient's medical care is consistent with medical management for inpatient care and reasonable inpatient medical practice.    RATIONALE FOR DETERMINATION:  59-year-old male with history of ischemic cardiomyopathy status post heart transplant May 2020, type 2 diabetes, hypertension now presents to the hospital with an episode of syncope along with a significant productive cough, dyspnea and a fever documented at home of 101.3 degrees.  Patient also having diarrhea.  Due to patient's significant comorbidities of post transplant on immunosuppressive therapy now with cough, fever and syncope.  Inpatient management is appropriate for thorough evaluation as well supportive treatment with IV fluids.    At the time of admission with the information available to the attending physician more than 2 nights Hospital complex care was anticipated, based on patient risk of adverse outcome if treated as outpatient and complex care required. Inpatient admission is appropriate based on the Medicare guidelines.    This document was produced using voice recognition software    The information on this document is developed by the utilization review team in order for the business office to ensure compliance. This only denotes the appropriateness of proper admission status and does not reflect the quality of care rendered.    The definitions of Inpatient Status and Observation Status used in making the determination above are those provided in the CMS Coverage Manual, Chapter 1 and Chapter 6, section 70.4.    Sincerely,    Andres Otero MD  Utilization  Review  Physician Advisor  [facility].

## 2020-09-01 NOTE — PLAN OF CARE
Patient here post heart transplant, fevers and syncopal episodes. Presenting with cough. Afebrile. A&Ox4. VSS. BG checks, on sliding scale and carb coverage. Ambulates stand by asst. Pt reports numbness to one side of face at intervals during the day. Neuro checks done, strength equal bilaterally. Has been a consistent symptom for him. Replaced magnesium and potassium during shift. On Zosyn antibiotics. Ordered urine and sputum samples. Still waiting for pt's sputum sample. Pt. Covid negative, and C-dif negative. Pt having loose brown stools. R&R ST. Pt's bed alarm on. Pt stable, and resting between cares. Continue with POC. Monitor for s/s infection. Notify Cards 2 with questions/concerns.

## 2020-09-01 NOTE — PLAN OF CARE
Pt admitted from ED last evening with hx fevers/syncopal episode.  Hx heart transplant 5/18/20.  T max 99.7.  +cough.  Sputum sent contaminated per lab.  Will resend when pt able.  Urine/stool samples sent.  Swab sent for Strep.  ABX given per orders.  Loose stool X2.  Sample result negative for CDiff.  Rhythm: -120's.  -170's.  No sliding scale per orders.  On scheduled Lantus.  Pt stable, resting between cares and assessments.  Continue current POC.  Follow s/s infection. Notify team with any issues/concerns.

## 2020-09-01 NOTE — CONSULTS
[facility]  Transplant Infectious Disease Consult Note - New Patient     Patient:  Mariusz Sharp, Date of birth 1960, Medical record number [id number]  Date of Visit:  09/01/2020  Consult requested by Dr. Nasreen Nielson for evaluation of fevers         Assessment and Recommendations:   Recommendations:  --Agree with continuing cefdinir and monitoring for fevers while inpatient  --Recommend RT to induce sputum for bacterial cultures and PCP PCR  --Follow up serum CMV PCR, EBV PCR, serum fungitell and aspergillus Ag.  --PCP prophylaxis Inhaled pentamidine-last dose 7/29/2020. Bactrim was discontinued 5/27/2020 2/2 hyperkalemia,    --Would consider retrying bactrim or alternatives like dapsone as breakthrough pneumocystis infections are common on nebulized pentamidine  --Viral serostatus & prophylaxis: Valcyte    Thank you very much for this consultation. Transplant Infectious Disease will continue to follow with you.    Lincoln Hightower MD    Transplant Infectious Disease  Pager [phone]    Problem List/Assessment:  1. Fevers -unclear source, no episodes inpatient  2. Mild diarrhea  3. Prostatitis - on cefdinir x 6 weeks thru 10/1/20.  4. ICM s/p LVAD  And subsequent OHT 5/18/20   -CMV D+/R-, EBV D-/R+, HSV1?/2?, VZV +, Toxo D-/R-   -Maintenance immunosuppression-cellcept, tacrolimus, pred  5. Rhinovirus shedding   6. Donor Blood Cx w/ S anginosus and Veillonella sp in 1/2 sets on 5/16/20 (suspected contaminant)-s/p 7 days antibiotics  7. CKD  8. DMII    Patient presents with episodes of fevers of 101.5 at home since Sunday night. States he has had persistent cough spells x2 episodes -most recent episode with syncope. Also has endorsed progressive dyspnea, although remains on room air. Otherwise has no localizing signs/symptoms of infection. No sputum production. CXR clear. COVID-19 testing negative. Urine strep and legionella also negative. At this time, would resume  cefdnir and monitor clinically. Will follow infectious work up done by team. Lack of hypoxia/productive cough and CXR findings makes resp infectious process less likely. Sputum cx ordered with PCP PCR testing as well.           History of Infectious Disease Illness:     Transplants:  5/18/2020 (Heart), Postoperative day:  106.  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 Induction with and maintenance immunosuppression with cellcept, tacrolimus and prednisone 5 mg. Antimicrobial prophylaxis includes valganciclovir and previously with bactrim (dc'd 5/27 due to hyperkalemia-switched to pentamidine)    His PMHx is also significant for LV thrombus, CKD, and DMII    Briefly to review relevant medical history,     --Hospitalized 8/18/20-8/21/20 with CAP (fevers/cough/dyspnea, but on room air).CXR showed retrocardiac opacity. COVID-19 was negative. ID was consulted. Infectious work up was positive for human rhinovirus/enterovirus. Imaging showed enlarged prostate-this with elevated prostate and urine sediment was c/f chronic prostatitis/UTI. He was treated with ceftriaxone and azithromycin and discharged on cefdinir for 6 weeks    Patient was doing well at home until last night. He states he developed a persistent hacking dry cough spell, associated with weakness and syncope/fall hitting his forehead. He also endorses high grade fevers of 101.3, associated right otalgia and sore throat. Also endorsing diarrhea ~3 days. He does endorse progressive dyspnea since his transplant but no orthopnea/LE edema.    On admission, patient was afebrile, HDS on room air. Initial labs with wbc 4.3, procalcitonin 0.23, lactate 1.5, Cr 1.6, normal LFTs. Blood cx were drawn and patient received vanc/zosyn. CXR was unremarkable. CT head negative and renal US with no acute pathology. UA  unremarkable    Microbiology:    9/1/20 Urine legionella Ag: negative  9/1/20 Serum CMV PCR pending  9/1/20 Serum EBV PCR pending  9/1/20 Serum cryptococcal Ag negative  9/1/20 Urine strep Ag negative  8/31/20 Serum Fungitell: pending  8/31/20 Serum Aspergillus Ag: pending  8/31/20 COVID-19 negative    Review of Systems: 10 point ROS unremarkable unless stated otherwise in HPI.    Past Medical History:   Diagnosis Date     Acute kidney injury (H)      CKD (chronic kidney disease)      Coronary artery disease      Diabetes mellitus (H)      HTN (hypertension)      Hyperlipidemia      ICD (implantable cardioverter-defibrillator), single chamber- NOT dependent 7/17/2018     Ischemic cardiomyopathy      LV (left ventricular) mural thrombus      Paroxysmal atrial flutter (H)      RVF (right ventricular failure) (H)      Systolic heart failure (H)      Ventricular tachycardia (H)        Past Surgical History:   Procedure Laterality Date     COLONOSCOPY N/A 12/7/2018    Procedure: COLONOSCOPY;  Surgeon: Krish Mercado MD;  [facility]: [facility] OR     CV HEART BIOPSY N/A 5/24/2020    Procedure: Heart Biopsy;  Surgeon: Kit Bacon MD;  [facility] HEART CARDIAC [facility]ATH LAB     CV HEART BIOPSY N/A 6/1/2020    Procedure: CV HEART BIOPSY;  Surgeon: Kit Bacon MD;  [facility]     CV HEART BIOPSY N/A 6/8/2020    Procedure: CV HEART BIOPSY;  Surgeon: Kit Bacon MD;  [facility]     CV HEART BIOPSY N/A 6/15/2020    Procedure: CV HEART BIOPSY;  Surgeon: Kit Bacon MD;  [facility]     CV HEART BIOPSY N/A 6/30/2020    Procedure: CV HEART BIOPSY;  Surgeon: Kit Bacon MD;  [facility]     CV HEART BIOPSY N/A 7/14/2020    Procedure: CV HEART BIOPSY;  Surgeon: Brian Long MD;  [facility]ATH LAB     CV HEART BIOPSY N/A 7/29/2020     Procedure: CV HEART BIOPSY;  Surgeon: Momo Hunt MD;  [facility] LAB     CV HEART BIOPSY N/A 8/13/2020    Procedure: CV HEART BIOPSY;  Surgeon: Khris Mcclelland MD;  [facility]     CV HEART BIOPSY N/A 8/26/2020    Procedure: CV HEART BIOPSY;  Surgeon: Momo Hunt MD;  [facility]     CV RIGHT HEART CATH N/A 2/19/2019    Procedure: RHC;  Surgeon: Brian Long MD;  [facility]     CV RIGHT HEART CATH N/A 7/5/2019    Procedure: CV RIGHT HEART CATH;  Surgeon: Khris Mcclelland MD;  [facility] LAB     CV RIGHT HEART CATH N/A 5/24/2020    Procedure: Right Heart Cath;  Surgeon: Kit Bacon MD;  [facility] C[facility] LAB     CV RIGHT HEART CATH N/A 6/1/2020    Procedure: CV RIGHT HEART CATH;  Surgeon: Kit Bacon MD;  [facility]     CV RIGHT HEART CATH N/A 6/8/2020    Procedure: CV RIGHT HEART CATH;  Surgeon: Kit Bacon MD;  [facility]     CV RIGHT HEART CATH N/A 6/15/2020    Procedure: CV RIGHT HEART CATH;  Surgeon: Kit Bacon MD;  [facility] LAB     CV RIGHT HEART CATH N/A 6/30/2020    Procedure: CV RIGHT HEART CATH;  Surgeon: Kit Bacon MD;  [facility]     CV RIGHT HEART CATH N/A 7/14/2020    Procedure: CV RIGHT HEART CATH;  Surgeon: Brian Long MD;  [facility]     CV RIGHT HEART CATH N/A 7/29/2020    Procedure: CV RIGHT HEART CATH;  Surgeon: Momo Hunt MD;  [facility] CATH LAB     CV RIGHT HEART CATH N/A 8/13/2020    Procedure: CV RIGHT HEART CATH;  Surgeon: Khris Mcclelland MD;  Location:  HEART CARDIAC CATH LAB     CV RIGHT HEART CATH N/A 8/26/2020    Procedure: CV RIGHT HEART CATH;  Surgeon: Marilee  Momo Salcedo MD;  Location:  HEART CARDIAC CATH LAB     HC PRQ TRLUML CORONARY ANGIOPLASTY ADDL BRANCH      PCI and ALYSSA to ostial LAD on 6/27/18     IMPLANT AUTOMATIC IMPLANTABLE CARDIOVERTER DEFIBRILLATOR       INSERT VENTRICULAR ASSIST DEVICE LEFT (HEARTMATE II) N/A 12/31/2018    Procedure: Median Sternotomy, On Cardiopulmonary Bypass Pump, Insertion of Left Ventricular Assist Device (Heartmate III);  Surgeon: Kamaljit Baker MD;  Location: [facility] OR     PICC DOUBLE LUMEN PLACEMENT Right 05/22/2020    5FR DL PICC inserted at right basilic vein, length 38cm.     TRANSPLANT HEART RECIPIENT AFTER HEART MATE N/A 5/18/2020    Procedure: REDO MEDIAN STERNOTOMY, LYSIS OF ADHESIONS, ON CARDIOPULMONARY BYPASS PUMP, HEART TRANSPLANT RECIPIENT, REMOVAL OF LEFT VENTRICULAR ASSIST DEVICE, REMOVAL OF AUTOMATED IMPLANTABLE CARDIOVERTER DEFIBRILLATOR;  Surgeon: Elder Willis MD;  Location:  OR       Social History     Tobacco Use     Smoking status: Never Smoker     Smokeless tobacco: Never Used   Substance Use Topics     Alcohol use: No     Frequency: Never     Drug use: No       Immunization History   Administered Date(s) Administered     Mantoux Tuberculin Skin Test 01/12/2019            Current Medications & Allergies:       aspirin  81 mg Oral Daily     calcium carbonate 600 mg-vitamin D 400 units  1 tablet Oral BID w/meals     heparin ANTICOAGULANT  5,000 Units Subcutaneous Q12H     hydrALAZINE  25 mg Oral TID     insulin aspart  1-7 Units Subcutaneous TID AC     insulin aspart  1-5 Units Subcutaneous At Bedtime     insulin aspart   Subcutaneous QAM AC     insulin aspart   Subcutaneous Daily with lunch     insulin aspart   Subcutaneous Daily with supper     insulin glargine  20 Units Subcutaneous At Bedtime     mycophenolate  1,000 mg Oral BID IS     nystatin  500,000 Units Oral 4x Daily     pantoprazole  40 mg Oral QAM AC     piperacillin-tazobactam  4.5 g Intravenous Q6H     rosuvastatin  10 mg Oral At Bedtime      sodium chloride (PF)  3 mL Intracatheter Q8H     tacrolimus  3 mg Oral BID IS     valGANciclovir  450 mg Oral Daily     vancomycin (VANCOCIN) IV  1,500 mg Intravenous Q18H       No Known Allergies         Physical Exam:     Ranges for vital signs:  Temp:  [97.8  F (36.6  C)-99.7  F (37.6  C)] 98.9  F (37.2  C)  Pulse:  [105-133] 105  Resp:  [10-21] 20  BP: (104-154)/(71-99) 107/78  SpO2:  [92 %-99 %] 93 %  Vitals:    08/31/20 1225 08/31/20 2258   Weight: 82 kg (180 lb 11.2 oz) 79.9 kg (176 lb 3.2 oz)       Physical Examination:  GENERAL:  well-developed, well-nourished, in bed in no acute distress.  HEAD:  Head is normocephalic, atraumatic   EYES:  Eyes have anicteric sclerae without conjunctival injection   ENT:  Oropharynx is moist without exudates or ulcers. Tongue is midline  NECK:  Supple. No cervical lymphadenopathy  LUNGS:  Clear to auscultation bilateral.   CARDIOVASCULAR:  Regular rate and rhythm with no murmurs, gallops or rubs.  ABDOMEN:  Normal bowel sounds, soft, nontender. No appreciable hepatosplenomegaly.  SKIN:  No acute rashes.  Line in place without any surrounding erythema or exudate.  NEUROLOGIC:  Grossly nonfocal. Active x4 extremities         Laboratory Data:       Inflammatory Markers    Recent Labs   Lab Test 08/19/20  1759  07/23/18  0645   CRP  --   --  11.0*   PSA 10.80*   < >  --     < > = values in this interval not displayed.     Metabolic Studies       Recent Labs   Lab Test 09/01/20  0658 08/31/20  1259 08/26/20  0939 08/19/20  0558 06/15/20  0826    130* 137 139 139   POTASSIUM 3.8 4.0 4.1 3.6 4.8   CHLORIDE 104 100 105 107 110*   CO2 22 22 25 25 21   ANIONGAP 9 8 7 6 8   BUN 21 33* 41* 27 98*   CR 1.38* 1.60* 1.91* 1.31* 1.89*   GFRESTIMATED 56* 47* 38* 60* 38*   * 186* 164* 69* 120*   A1C  --   --   --  7.4* 7.8*   ARLENE 8.5 9.6 9.3 8.4* 9.0   PHOS  --   --  3.0  --  4.4   MAG 1.4*  --  1.7  --  1.7   LACT 0.9 1.5  --   --   --    PCAL  --  0.23  --   --   --    FGTL   --   --   --   --   --    CKT  --   --   --   --  132    < > = values in this interval not displayed.       Hepatic Studies    Recent Labs   Lab Test 09/01/20  0658 08/31/20  1259 08/26/20  0939 07/29/20  0902   BILITOTAL 0.9 0.8 0.5 0.6   DBIL  --   --   --  0.2   ALKPHOS 62 76 73 42   PROTTOTAL 6.1* 7.3 6.8 6.3*   ALBUMIN 2.4* 3.1* 3.1* 3.4   AST 16 21 20 20   ALT 11 14 19 32   *  --   --   --     < > = values in this interval not displayed.     Hematology Studies      Recent Labs   Lab Test 09/01/20 0658 08/31/20  1259 08/26/20  0938 08/21/20  0456 08/20/20  0539   WBC 3.4* 4.2 3.5* 2.3* 2.4*   ANEU 2.6 3.5 3.0  --   --    ALYM 0.5* 0.4* 0.4*  --   --    MARTHA 0.3 0.2 0.1  --   --    AEOS 0.0 0.1 0.0  --   --    HGB 7.9* 9.7* 9.0* 8.8* 9.0*   HCT 26.0* 31.3* 29.0* 28.8* 29.4*    374 315 263 262       Urine Studies     Recent Labs   Lab Test 08/31/20  1615 08/20/20  1630 08/18/20  1404 05/18/20  0135 05/12/20  2250  01/30/19  1255   URINEPH 5.5 5.5 5.5 5.0 5.0   < > 5.0   NITRITE Negative Negative Negative Negative Negative   < > Negative   LEUKEST Negative Small* Moderate* Negative Negative   < > Negative   WBCU 1 12* 28* 7* 3   < > 82*   UWBCLM  --   --   --   --   --   --  Present*    < > = values in this interval not displayed.       Medication levels    Recent Labs   Lab Test 08/26/20  0939  05/27/20  0837   VANCOMYCIN  --   --  26.6*   TACROL 12.8   < >  --     < > = values in this interval not displayed.       Microbiology:  Fungal testing  Recent Labs   Lab Test 08/19/20  1759   FGTL 86       Syphilis Testing    Treponema Antibodies   Date Value Ref Range Status   05/15/2020 Nonreactive NR^Nonreactive Final     Comment:     Methodology Change: Test performed on the [employer] Liaison XL by Treponema   pallidum Total Antibodies Assay as of 3.17.2020.         Quantiferon testing   Recent Labs   Lab Test 09/01/20  0658 08/31/20  1259   LYMPH 14.2 8.8       Virology:  Respiratory virus testing     Recent Labs   Lab Test 08/31/20  1302 08/21/20  0520 08/19/20  1900 08/18/20  1235 05/29/20  1530   IFLUA  --   --  Not Detected  --   --    FLUAH1  --   --  Not Detected  --   --    BF8812  --   --  Not Detected  --   --    FLUAH3  --   --  Not Detected  --   --    IFLUB  --   --  Not Detected  --   --    PIV1  --   --  Not Detected  --   --    PIV2  --   --  Not Detected  --   --    PIV3  --   --  Not Detected  --   --    PIV4  --   --  Not Detected  --   --    RSVA  --   --  Not Detected  --   --    RSVB  --   --  Not Detected  --   --    HMPV  --   --  Not Detected  --   --    ADENOV  --   --  Not Detected  --   --    CORONA  --   --  Not Detected  --   --    [id number] Nasopharyngeal Nasopharyngeal  --  Nasopharyngeal Nasopharyngeal   SARSCOVRES NEGATIVE NEGATIVE  --  NEGATIVE NEGATIVE       CMV viral loads    Recent Labs   Lab Test 08/19/20  1801 08/13/20  0541 06/15/20  0826   CSPEC Plasma EDTA PLASMA EDTA PLASMA   CMVLOG Not Calculated Not Calculated Not Calculated       Log IU/mL of CMVQNT   Date Value Ref Range Status   08/19/2020 Not Calculated <2.1 [Log_IU]/mL Final   08/13/2020 Not Calculated <2.1 [Log_IU]/mL Final   06/15/2020 Not Calculated <2.1 [Log_IU]/mL Final       No results found for: H6RES    EBV DNA Copies/mL   Date Value Ref Range Status   08/13/2020 EBV DNA Not Detected EBVNEG^EBV DNA Not Detected [Copies]/mL Final   06/15/2020 EBV DNA Not Detected EBVNEG^EBV DNA Not Detected [Copies]/mL Final       BK Virus Result   Date Value Ref Range Status   08/19/2020 BK Virus DNA Not Detected BKNEG^BK Virus DNA Not Detected copies/mL Final     Hepatitis B Testing     Recent Labs   Lab Test 05/15/20  0559   AUSAB 419.90*   HBCAB Nonreactive   HEPBANG Nonreactive        Hepatitis C Antibody   Date Value Ref Range Status   05/15/2020 Nonreactive NR^Nonreactive Final     Comment:     Assay performance characteristics have not been established for newborns,   infants, and children     12/04/2019  Nonreactive NR^Nonreactive Final     Comment:     Assay performance characteristics have not been established for newborns,   infants, and children         CMV Antibody IgG   Date Value Ref Range Status   05/18/2020 0.3 0.0 - 0.8 AI Final     Comment:     Negative  Antibody index (AI) values reflect qualitative changes in antibody   concentration that cannot be directly associated with clinical condition or   disease state.       EBV Capsid Antibody IgG   Date Value Ref Range Status   05/18/2020 >8.0 (H) 0.0 - 0.8 AI Final     Comment:     Positive, suggests recent or past exposure  Antibody index (AI) values reflect qualitative changes in antibody   concentration that cannot be directly associated with clinical condition or   disease state.       Toxoplasma Antibody IgG   Date Value Ref Range Status   05/18/2020 <3.0 0.0 - 7.1 IU/mL Final     Comment:     Negative- Absence of detectable Toxoplasma gondii IgG antibodies. A negative   result does not rule out acute infection.  The magnitude of the measured result is not indicative of the amount of   antibody present. The concentrations of anti-Toxoplasma gondii IgG in a given   specimen determined with assays from different manufacturers can vary due to   differences in assay methods and reagent specificity.       No results found for: H1IGG, H2IGG, EBVCAM    Imaging:  Recent Results (from the past 48 hour(s))   XR Chest Port 1 View    Narrative    EXAM: XR CHEST PORT 1 VW  8/31/2020 1:52 PM     HISTORY:  Cough       COMPARISON:  Chest radiograph 8/18/2020    FINDINGS:   Single portable AP supine view of the chest. Postsurgical changes in  the chest. Sternotomy wires are intact. Stable position of catheter  projecting over left lung apex.    Trachea is midline. Cardiomediastinal silhouette is stable. The  pulmonary vasculature is distinct. Bilateral costophrenic angles are  not included in current study. No focal airspace opacity, large  pleural effusion or  appreciable pneumothorax.    No acute osseous abnormality. Visualized upper abdomen is  unremarkable.        Impression    IMPRESSION: No acute airspace disease.     I have personally reviewed the examination and initial interpretation  and I agree with the findings.    ALICIA CEJA MD   Head CT w/o contrast    Narrative    CT HEAD W/O CONTRAST 2020 2:43 PM    History: Head trauma, minor, GCS>=13, low clinical risk, initial exam     Comparison: 2019    Technique: Using multidetector thin collimation helical acquisition  technique, axial, coronal and sagittal CT images from the skull base  to the vertex were obtained without intravenous contrast.    Findings: There is no intracranial hemorrhage, mass effect, or midline  shift. Gray/white matter differentiation in both cerebral hemispheres  is preserved. Mild cerebral volume loss. Ventricles are proportionate  to the cerebral sulci. The basal cisterns are clear. Atherosclerotic  calcification at the carotid siphons.    The bony calvaria and the bones of the skull base are normal. The  visualized portions of the paranasal sinuses and mastoid air cells are  clear.       Impression    Impression:  No acute intracranial pathology.     I have personally reviewed the examination and initial interpretation  and I agree with the findings.    EBONI REDMAN MD   Echocardiogram Limited    Narrative    [id number]  [id number]  [id number]  [id number]^SIM^SONAL^CHIDI           [facility],[place]  Echocardiography Laboratory  [address] [place] [address]     Name: ADILSON RINCON  MRN: [id number]  : 1960  Study Date: 2020 03:15 PM  Age: 59 yrs  Gender: Male  Patient [facility]  Reason For Study: S/P Heart tranplant, SOB  Ordering Physician: SONAL MONTEMAYOR  Referring Physician: SEVEN SIERRA  Performed By: Nahid Siu RDCS     BSA: 1.9 m2  Height: 64 in  Weight: 180 lb  HR: 114  BP: 109/79  mmHg  _____________________________________________________________________________  __        Procedure  Limited Portable Echo Adult.  _____________________________________________________________________________  __        Interpretation Summary  Global and regional left ventricular function is hyperkinetic with an EF >70%.  Right ventricular function, chamber size, wall motion, and thickness are  normal.  Small collapsed IVC.  Trivial pericardial effusion is present.  _____________________________________________________________________________  __        Left Ventricle  Global and regional left ventricular function is hyperkinetic with an EF >70%.     Right Ventricle  Right ventricular function, chamber size, wall motion, and thickness are  normal.     Vessels  Small collapsed IVC.     Pericardium  Trivial pericardial effusion is present.     _____________________________________________________________________________  __        Doppler Measurements & Calculations     PA acc time: 0.11 sec     _____________________________________________________________________________  __           Report approved by: Dayna Rivera 08/31/2020 03:47 PM      US Renal Complete    Narrative    EXAMINATION: US RENAL COMPLETE, 8/31/2020 3:55 PM     COMPARISON: CT AP 6/2/2020    HISTORY: Evaluation of kidney stone    TECHNIQUE: The kidneys and bladder were scanned in the standard  fashion with specialized ultrasound transducer(s) using both gray  scale and limited color/spectral Doppler techniques.    FINDINGS:    Right kidney: Measures 11.7 cm in length. Parenchyma is of normal  thickness and echogenicity. No focal mass. No hydronephrosis.  Echogenic focus measuring 0.5 x 0.3 cm in the upper pole without  associated posterior acoustic shadowing.    Left kidney: Measures 13.5 cm in length. Parenchyma is of normal  thickness and echogenicity. No focal mass. No hydronephrosis. There is  an anechoic cyst measuring 1.4 x 1.25 x 1.27  cm. 4 mm echogenic focus  without posterior acoustic shadowing in the midpole.    Bladder: Unremarkable.      Impression    IMPRESSION:  1. Small echogenic foci in the upper pole of the right kidney and  midpole the left kidney without posterior acoustic shadowing and no  correlate on 6/2/2020 CT are unlikely to represent stones, potentially  representing invagination of perinephric fat and extension of renal  sinus fat, respectively, or other reflective interfaces of doubtful  clinical significance. No appreciable urinary tract stone. The large  left renal pelvis stone previously seen on 6/2/2020 CT is not  visualized.  2. No hydronephrosis.    I have personally reviewed the examination and initial interpretation  and I agree with the findings.    MEGAN MENARD, DO

## 2020-09-01 NOTE — PROGRESS NOTES
"CLINICAL NUTRITION SERVICES    Reason for Assessment:  Low-sodium (2 g/day) nutrition education, received consult.    Diet History:  Per RD note 5/21/20, \"Has received low sodium diet education in the past, but does not always follow.\"    Per discussion with pt over the phone on 9/1, he has received low-sodium nutrition education in the past. S/p heart transplant (5/18/2020).    Nutrition Diagnosis:  No nutrition education dx.    Nutrition Prescription/Recs:  Continue low-sodium diet. Monitor potential need for a fluid restriction, if warranted.    Interventions:  Nutrition Education: Per pt, aware of the low-sodium diet and does not have any questions at this time.     Goals:    Pt will verbalize at least five high sodium foods and the importance of avoiding added salt to foods for cooking or seasoning foods.     Follow-up:   Patient to ask any further nutrition-related questions before discharge. In addition, pt may request outpatient RD appointment.     Dorcas Meek, MS, RD, LD, [facility]   6C Pgr: [phone]   "

## 2020-09-02 ENCOUNTER — APPOINTMENT (OUTPATIENT)
Dept: CT IMAGING | Facility: CLINIC | Age: 60
End: 2020-09-02
Attending: NURSE PRACTITIONER
Payer: COMMERCIAL

## 2020-09-02 ENCOUNTER — APPOINTMENT (OUTPATIENT)
Dept: OCCUPATIONAL THERAPY | Facility: CLINIC | Age: 60
End: 2020-09-02
Payer: COMMERCIAL

## 2020-09-02 LAB
1,3 BETA GLUCAN SER-MCNC: 90 PG/ML
ALBUMIN SERPL-MCNC: 2.3 G/DL (ref 3.4–5)
ALP SERPL-CCNC: 58 U/L (ref 40–150)
ALT SERPL W P-5'-P-CCNC: 14 U/L (ref 0–70)
ANION GAP SERPL CALCULATED.3IONS-SCNC: 7 MMOL/L (ref 3–14)
AST SERPL W P-5'-P-CCNC: 19 U/L (ref 0–45)
B-D GLUCAN INTERPRETATION (1,3): POSITIVE
BASOPHILS # BLD AUTO: 0 10E9/L (ref 0–0.2)
BASOPHILS NFR BLD AUTO: 0.9 %
BILIRUB SERPL-MCNC: 0.5 MG/DL (ref 0.2–1.3)
BUN SERPL-MCNC: 24 MG/DL (ref 7–30)
CALCIUM SERPL-MCNC: 8.5 MG/DL (ref 8.5–10.1)
CHLORIDE SERPL-SCNC: 107 MMOL/L (ref 94–109)
CMV DNA SPEC NAA+PROBE-ACNC: NORMAL [IU]/ML
CMV DNA SPEC NAA+PROBE-LOG#: NORMAL {LOG_IU}/ML
CO2 SERPL-SCNC: 22 MMOL/L (ref 20–32)
CREAT SERPL-MCNC: 1.32 MG/DL (ref 0.66–1.25)
DIFFERENTIAL METHOD BLD: ABNORMAL
EBV DNA # SPEC NAA+PROBE: NORMAL {COPIES}/ML
EBV DNA SPEC NAA+PROBE-LOG#: NORMAL {LOG_COPIES}/ML
EOSINOPHIL # BLD AUTO: 0.1 10E9/L (ref 0–0.7)
EOSINOPHIL NFR BLD AUTO: 1.8 %
ERYTHROCYTE [DISTWIDTH] IN BLOOD BY AUTOMATED COUNT: 14.3 % (ref 10–15)
GALACTOMANNAN AG SERPL QL IA: NEGATIVE
GALACTOMANNAN AG SERPL-ACNC: 0.05
GFR SERPL CREATININE-BSD FRML MDRD: 59 ML/MIN/{1.73_M2}
GLUCOSE BLDC GLUCOMTR-MCNC: 114 MG/DL (ref 70–99)
GLUCOSE BLDC GLUCOMTR-MCNC: 144 MG/DL (ref 70–99)
GLUCOSE BLDC GLUCOMTR-MCNC: 210 MG/DL (ref 70–99)
GLUCOSE BLDC GLUCOMTR-MCNC: 262 MG/DL (ref 70–99)
GLUCOSE BLDC GLUCOMTR-MCNC: 291 MG/DL (ref 70–99)
GLUCOSE SERPL-MCNC: 126 MG/DL (ref 70–99)
GRAM STN SPEC: ABNORMAL
HCT VFR BLD AUTO: 26.7 % (ref 40–53)
HGB BLD-MCNC: 8.1 G/DL (ref 13.3–17.7)
LYMPHOCYTES # BLD AUTO: 0.7 10E9/L (ref 0.8–5.3)
LYMPHOCYTES NFR BLD AUTO: 25.4 %
MAGNESIUM SERPL-MCNC: 1.9 MG/DL (ref 1.6–2.3)
MCH RBC QN AUTO: 27.5 PG (ref 26.5–33)
MCHC RBC AUTO-ENTMCNC: 30.3 G/DL (ref 31.5–36.5)
MCV RBC AUTO: 91 FL (ref 78–100)
MONOCYTES # BLD AUTO: 0.2 10E9/L (ref 0–1.3)
MONOCYTES NFR BLD AUTO: 6.1 %
NEUTROPHILS # BLD AUTO: 1.8 10E9/L (ref 1.6–8.3)
NEUTROPHILS NFR BLD AUTO: 65.8 %
OVALOCYTES BLD QL SMEAR: ABNORMAL
PLATELET # BLD AUTO: 354 10E9/L (ref 150–450)
PLATELET # BLD EST: ABNORMAL 10*3/UL
POIKILOCYTOSIS BLD QL SMEAR: ABNORMAL
POTASSIUM SERPL-SCNC: 4.1 MMOL/L (ref 3.4–5.3)
PROT SERPL-MCNC: 6 G/DL (ref 6.8–8.8)
RBC # BLD AUTO: 2.95 10E12/L (ref 4.4–5.9)
SODIUM SERPL-SCNC: 137 MMOL/L (ref 133–144)
SPECIMEN SOURCE: ABNORMAL
SPECIMEN SOURCE: NORMAL
T GONDII IGG SER-ACNC: <3 IU/ML
T GONDII IGM SER-ACNC: <3 AU/ML
TACROLIMUS BLD-MCNC: 6.5 UG/L (ref 5–15)
TME LAST DOSE: NORMAL H
WBC # BLD AUTO: 2.8 10E9/L (ref 4–11)

## 2020-09-02 PROCEDURE — 25000128 H RX IP 250 OP 636: Performed by: STUDENT IN AN ORGANIZED HEALTH CARE EDUCATION/TRAINING PROGRAM

## 2020-09-02 PROCEDURE — 99233 SBSQ HOSP IP/OBS HIGH 50: CPT | Performed by: NURSE PRACTITIONER

## 2020-09-02 PROCEDURE — 94640 AIRWAY INHALATION TREATMENT: CPT

## 2020-09-02 PROCEDURE — 25000125 ZZHC RX 250: Performed by: NURSE PRACTITIONER

## 2020-09-02 PROCEDURE — 87205 SMEAR GRAM STAIN: CPT | Performed by: INTERNAL MEDICINE

## 2020-09-02 PROCEDURE — 25000131 ZZH RX MED GY IP 250 OP 636 PS 637: Performed by: STUDENT IN AN ORGANIZED HEALTH CARE EDUCATION/TRAINING PROGRAM

## 2020-09-02 PROCEDURE — 85025 COMPLETE CBC W/AUTO DIFF WBC: CPT | Performed by: STUDENT IN AN ORGANIZED HEALTH CARE EDUCATION/TRAINING PROGRAM

## 2020-09-02 PROCEDURE — 80053 COMPREHEN METABOLIC PANEL: CPT | Performed by: STUDENT IN AN ORGANIZED HEALTH CARE EDUCATION/TRAINING PROGRAM

## 2020-09-02 PROCEDURE — 00000146 ZZHCL STATISTIC GLUCOSE BY METER IP

## 2020-09-02 PROCEDURE — 40000275 ZZH STATISTIC RCP TIME EA 10 MIN

## 2020-09-02 PROCEDURE — 25000131 ZZH RX MED GY IP 250 OP 636 PS 637: Performed by: NURSE PRACTITIONER

## 2020-09-02 PROCEDURE — 83735 ASSAY OF MAGNESIUM: CPT | Performed by: STUDENT IN AN ORGANIZED HEALTH CARE EDUCATION/TRAINING PROGRAM

## 2020-09-02 PROCEDURE — 80197 ASSAY OF TACROLIMUS: CPT | Performed by: NURSE PRACTITIONER

## 2020-09-02 PROCEDURE — 25000132 ZZH RX MED GY IP 250 OP 250 PS 637: Performed by: NURSE PRACTITIONER

## 2020-09-02 PROCEDURE — 71250 CT THORAX DX C-: CPT

## 2020-09-02 PROCEDURE — 25000132 ZZH RX MED GY IP 250 OP 250 PS 637: Performed by: STUDENT IN AN ORGANIZED HEALTH CARE EDUCATION/TRAINING PROGRAM

## 2020-09-02 PROCEDURE — 94799 UNLISTED PULMONARY SVC/PX: CPT

## 2020-09-02 PROCEDURE — 87798 DETECT AGENT NOS DNA AMP: CPT | Performed by: STUDENT IN AN ORGANIZED HEALTH CARE EDUCATION/TRAINING PROGRAM

## 2020-09-02 PROCEDURE — 97110 THERAPEUTIC EXERCISES: CPT | Mod: GO | Performed by: OCCUPATIONAL THERAPIST

## 2020-09-02 PROCEDURE — 21400000 ZZH R&B CCU UMMC

## 2020-09-02 PROCEDURE — 36415 COLL VENOUS BLD VENIPUNCTURE: CPT | Performed by: STUDENT IN AN ORGANIZED HEALTH CARE EDUCATION/TRAINING PROGRAM

## 2020-09-02 RX ORDER — TACROLIMUS 1 MG/1
3 CAPSULE ORAL
Status: DISCONTINUED | OUTPATIENT
Start: 2020-09-03 | End: 2020-09-04

## 2020-09-02 RX ORDER — TACROLIMUS 1 MG/1
4 CAPSULE ORAL
Status: DISCONTINUED | OUTPATIENT
Start: 2020-09-02 | End: 2020-09-04

## 2020-09-02 RX ADMIN — ALBUTEROL SULFATE 2.5 MG: 2.5 SOLUTION RESPIRATORY (INHALATION) at 10:23

## 2020-09-02 RX ADMIN — INSULIN ASPART 2 UNITS: 100 INJECTION, SOLUTION INTRAVENOUS; SUBCUTANEOUS at 12:38

## 2020-09-02 RX ADMIN — ACETAMINOPHEN 650 MG: 325 TABLET, FILM COATED ORAL at 21:05

## 2020-09-02 RX ADMIN — CEFDINIR 300 MG: 300 CAPSULE ORAL at 19:22

## 2020-09-02 RX ADMIN — INSULIN ASPART 3 UNITS: 100 INJECTION, SOLUTION INTRAVENOUS; SUBCUTANEOUS at 18:26

## 2020-09-02 RX ADMIN — Medication 1 TABLET: at 18:39

## 2020-09-02 RX ADMIN — ROSUVASTATIN CALCIUM 10 MG: 10 TABLET, FILM COATED ORAL at 19:22

## 2020-09-02 RX ADMIN — Medication 12.5 MG: at 08:32

## 2020-09-02 RX ADMIN — NYSTATIN 500000 UNITS: 500000 SUSPENSION ORAL at 12:17

## 2020-09-02 RX ADMIN — INSULIN ASPART 5 UNITS: 100 INJECTION, SOLUTION INTRAVENOUS; SUBCUTANEOUS at 09:08

## 2020-09-02 RX ADMIN — MYCOPHENOLATE MOFETIL 1000 MG: 500 TABLET ORAL at 08:31

## 2020-09-02 RX ADMIN — CEFDINIR 300 MG: 300 CAPSULE ORAL at 08:33

## 2020-09-02 RX ADMIN — ASPIRIN 81 MG CHEWABLE TABLET 81 MG: 81 TABLET CHEWABLE at 08:31

## 2020-09-02 RX ADMIN — TACROLIMUS 3 MG: 1 CAPSULE ORAL at 08:31

## 2020-09-02 RX ADMIN — PANTOPRAZOLE SODIUM 40 MG: 40 TABLET, DELAYED RELEASE ORAL at 08:32

## 2020-09-02 RX ADMIN — NYSTATIN 500000 UNITS: 500000 SUSPENSION ORAL at 19:22

## 2020-09-02 RX ADMIN — Medication 5000 UNITS: at 22:32

## 2020-09-02 RX ADMIN — Medication 12.5 MG: at 19:22

## 2020-09-02 RX ADMIN — Medication 1 TABLET: at 08:31

## 2020-09-02 RX ADMIN — NYSTATIN 500000 UNITS: 500000 SUSPENSION ORAL at 08:32

## 2020-09-02 RX ADMIN — NYSTATIN 500000 UNITS: 500000 SUSPENSION ORAL at 15:53

## 2020-09-02 RX ADMIN — TACROLIMUS 4 MG: 1 CAPSULE ORAL at 18:39

## 2020-09-02 RX ADMIN — MYCOPHENOLATE MOFETIL 1000 MG: 500 TABLET ORAL at 18:38

## 2020-09-02 RX ADMIN — Medication 12.5 MG: at 15:53

## 2020-09-02 RX ADMIN — VALGANCICLOVIR 450 MG: 450 TABLET, FILM COATED ORAL at 08:31

## 2020-09-02 RX ADMIN — MAGNESIUM SULFATE IN WATER 2 G: 40 INJECTION, SOLUTION INTRAVENOUS at 08:46

## 2020-09-02 RX ADMIN — Medication 5000 UNITS: at 11:24

## 2020-09-02 RX ADMIN — INSULIN GLARGINE 20 UNITS: 100 INJECTION, SOLUTION SUBCUTANEOUS at 21:10

## 2020-09-02 ASSESSMENT — PAIN DESCRIPTION - DESCRIPTORS
DESCRIPTORS: ACHING
DESCRIPTORS: DISCOMFORT

## 2020-09-02 ASSESSMENT — ACTIVITIES OF DAILY LIVING (ADL)
ADLS_ACUITY_SCORE: 13

## 2020-09-02 ASSESSMENT — MIFFLIN-ST. JEOR: SCORE: 1537.05

## 2020-09-02 NOTE — PLAN OF CARE
AVSS.  A&OX4.  Denies discomfort/SOB.  94-97% on RA.  Sputum sample sent.  Voiding per urinal.  No stools this shift.  BG stable.  Pt sleeping in NAD.  's. Continue current POC.  Notify team with any issues/concerns.

## 2020-09-02 NOTE — PROGRESS NOTES
[facility]   Cardiology II Service / Advanced Heart Failure  Daily Progress Note  Date of Service: 9/2/2020      Patient: Mariusz Sharp  MRN: [id number]  Admission Date: 8/31/2020  Hospital Day # 2    Assessment and Plan: Mariusz Sharp is a 59 year old male with PMH of CAD, LV thrombus, CKD Stage III, PAF, DM Type II, and ICM s/p HM III LVAD as BTT with subsequent OHT 5/18/20. He presents with progressive SOB, cough, and fever.      Fever with Cough. Tmax 102 prior to admission. History of rhinovirus. CDIF, LA, UA, COVID, Legionella, Cryptococcus, and Strep pneumo negative. Renal US notes resolution of prior stone. RVP and Chest X-ray negative. History of PAcnes to outflow graft and Donor positive S Anginosus and Veillonella (completed course of antibiotics). Concern for Chronic Prostatitis. Vanco/Zosyn discontinued 9/1.  - Appreciate Transplant ID Consult.   - Sputum culture contaminated.  -  Aspergillus, CMV, EBV, and Beta D glucan pending.   - Blood cultures NTD.   - Aggressive pulmonary toileting and nebs. If no improvement consider NonContrast CT Chest.   - Continue Cefdinir 300 mg po BID through 10/1/20.     Syncope. Occurred with cough prior to admission.   - Encourage fluids.   - Aggressive pulmonary toileting and nebs.      Diarrhea, improving.   - CDIF negative and CMV pending.      S/p OHT secondary to ICM. 5/18/20. Echo 8/31 with EF 70% and normal graft function.   RHC 8/26 with mRA-3, RV-20/2, mPA-15, mPCWP-10, AMRIT CO-4.91, AMRIT CI-2.63, biopsy negative.   Immunosuppression: Simulect 5/18 and 5/22. Tac increased to 4 mg at HS and 3 mg in the AM, (note stopped clotrimazole troches). Tac level 6.5, Goal-8-10. Cellcept 1000 mg po BID, and Prednisone completed 8/27/20.  Serostatus: CMV: D+, R-, EBV: D- R+, Toxo D- R-  Prophylaxis: Valcyte. Bactrim discontinued due to hyperkalemia. Pentamidine neb last administered 7/29, no further indication given he is off steroids.    - Continue ASA and  "Crestor.      HTN.   - Prior to admission Norvasc on  Hold. Hydralazine decreased to 12.5 mg po TID.   - /86     DM Type II.   - Lantus 20 units at HS.   - Novolog preprandial with medium intensity sliding scale insulin.      Elevated PSA with Concern for Chronic Prostatitis. PSA-8.21. MRI consistent with BPH.   - Continue Cefdinir 300 mg po BID through 10/1/20.  ================================================================    Interval History/ROS: He complains of persistent nonproductive cough with RADER. He notes mild improvement to flushed sensation and denies fever. He denies chest pain, palpitations, nausea, vomiting, and LE edema. He notes one episode of loose stools this AM. He is tolerating oral intake.     Last 24 hr care team notes reviewed.   ROS:  4 point ROS including Respiratory, CV, GI and , other than that noted in the HPI, is negative.     Medications: Reviewed in EPIC.     Physical Exam:   BP 98/69 (BP Location: Left arm)   Pulse 118   Temp 97.6  F (36.4  C) (Oral)   Resp 16   Ht 1.626 m (5' 4\")   Wt 80.1 kg (176 lb 9.6 oz)   SpO2 95%   BMI 30.31 kg/m    GENERAL: Appears alert and oriented times three.   HEENT: Eye symmetrical and free of discharge bilaterally. Mucous membranes moist and without lesions.  NECK: Supple and without lymphadenopathy. JVD below clavicular line upright.    CV: Regular, tachycardic., S1S2 present without murmur, rub, or gallop.   RESPIRATORY: Respirations regular, even, and unlabored. Lungs CTA throughout.   GI: Soft and non distended with normoactive bowel sounds present in all quadrants. No tenderness, rebound, guarding. No organomegaly.   EXTREMITIES: No peripheral edema. 2+ bilateral pedal pulses.   NEUROLOGIC: Alert and orientated x 3. CN II-XII grossly intact. No focal deficits.   MUSCULOSKELETAL: No joint swelling or tenderness.   SKIN: No jaundice. No rashes or lesions.     Data:  CMP  Recent Labs   Lab 09/02/20  0551 09/01/20  0658 08/31/20  1259 "    134 130*   POTASSIUM 4.1 3.8 4.0   CHLORIDE 107 104 100   CO2 22 22 22   ANIONGAP 7 9 8   * 118* 186*   BUN 24 21 33*   CR 1.32* 1.38* 1.60*   GFRESTIMATED 59* 56* 47*   GFRESTBLACK 69 65 54*   ARLENE 8.5 8.5 9.6   MAG 1.9 1.4*  --    PROTTOTAL 6.0* 6.1* 7.3   ALBUMIN 2.3* 2.4* 3.1*   BILITOTAL 0.5 0.9 0.8   ALKPHOS 58 62 76   AST 19 16 21   ALT 14 11 14     CBC  Recent Labs   Lab 09/02/20  0551 09/01/20  0658 08/31/20  1259   WBC 2.8* 3.4* 4.2   RBC 2.95* 2.91* 3.50*   HGB 8.1* 7.9* 9.7*   HCT 26.7* 26.0* 31.3*   MCV 91 89 89   MCH 27.5 27.1 27.7   MCHC 30.3* 30.4* 31.0*   RDW 14.3 14.5 14.6    336 374     Patient discussed with Dr. Ortiz.      Antonia Byrne FN  9/2/2020

## 2020-09-02 NOTE — PLAN OF CARE
Aox4. VSS. Stand by asst.   Loose stools. Urinating adequately.   C/o bilateral leg pain in am. Relieved with ambulation/range of motion.   Given acapella from RT, and PRN albuterol neb for cough. Cough persists.  Ordered CT scan to monitor lung function   Still waiting for good sputum sample; non-productive cough.   Magnesium replaced during shift (was 1.9)  BG checks (114-262)    Continue to follow POC. Call Cards 2 with concerns.

## 2020-09-02 NOTE — PLAN OF CARE
Pt A&Ox4, VSS, pt slept most of shift, UA sent, still waiting for sputum sample.  K (3.8) 20 meq and Mag (1.4) 4g rep this shift.  Rhythm: -110  Pain: Intermittent right sided facial pain.  Gtt: Abtx per schedule  Plan: Identify infection and treat.  Continue to monitor and contact Cards 2 with concerns.

## 2020-09-03 ENCOUNTER — TELEPHONE (OUTPATIENT)
Dept: UROLOGY | Facility: CLINIC | Age: 60
End: 2020-09-03

## 2020-09-03 LAB
ALBUMIN SERPL-MCNC: 2.5 G/DL (ref 3.4–5)
ALP SERPL-CCNC: 66 U/L (ref 40–150)
ALT SERPL W P-5'-P-CCNC: 16 U/L (ref 0–70)
ANION GAP SERPL CALCULATED.3IONS-SCNC: 7 MMOL/L (ref 3–14)
AST SERPL W P-5'-P-CCNC: 19 U/L (ref 0–45)
BACTERIA SPEC CULT: ABNORMAL
BACTERIA SPEC CULT: ABNORMAL
BASOPHILS # BLD AUTO: 0 10E9/L (ref 0–0.2)
BASOPHILS NFR BLD AUTO: 0 %
BILIRUB SERPL-MCNC: 0.4 MG/DL (ref 0.2–1.3)
BUN SERPL-MCNC: 23 MG/DL (ref 7–30)
CALCIUM SERPL-MCNC: 8.6 MG/DL (ref 8.5–10.1)
CHLORIDE SERPL-SCNC: 106 MMOL/L (ref 94–109)
CO2 SERPL-SCNC: 23 MMOL/L (ref 20–32)
CREAT SERPL-MCNC: 1.13 MG/DL (ref 0.66–1.25)
DIFFERENTIAL METHOD BLD: ABNORMAL
EOSINOPHIL # BLD AUTO: 0.1 10E9/L (ref 0–0.7)
EOSINOPHIL NFR BLD AUTO: 2.6 %
ERYTHROCYTE [DISTWIDTH] IN BLOOD BY AUTOMATED COUNT: 14.1 % (ref 10–15)
GFR SERPL CREATININE-BSD FRML MDRD: 71 ML/MIN/{1.73_M2}
GLUCOSE BLDC GLUCOMTR-MCNC: 156 MG/DL (ref 70–99)
GLUCOSE BLDC GLUCOMTR-MCNC: 161 MG/DL (ref 70–99)
GLUCOSE BLDC GLUCOMTR-MCNC: 171 MG/DL (ref 70–99)
GLUCOSE BLDC GLUCOMTR-MCNC: 197 MG/DL (ref 70–99)
GLUCOSE BLDC GLUCOMTR-MCNC: 223 MG/DL (ref 70–99)
GLUCOSE SERPL-MCNC: 191 MG/DL (ref 70–99)
HCT VFR BLD AUTO: 27 % (ref 40–53)
HGB BLD-MCNC: 8.3 G/DL (ref 13.3–17.7)
LYMPHOCYTES # BLD AUTO: 0.3 10E9/L (ref 0.8–5.3)
LYMPHOCYTES NFR BLD AUTO: 11.2 %
MAGNESIUM SERPL-MCNC: 1.6 MG/DL (ref 1.6–2.3)
MCH RBC QN AUTO: 27.4 PG (ref 26.5–33)
MCHC RBC AUTO-ENTMCNC: 30.7 G/DL (ref 31.5–36.5)
MCV RBC AUTO: 89 FL (ref 78–100)
MONOCYTES # BLD AUTO: 0.2 10E9/L (ref 0–1.3)
MONOCYTES NFR BLD AUTO: 7.8 %
NEUTROPHILS # BLD AUTO: 2.4 10E9/L (ref 1.6–8.3)
NEUTROPHILS NFR BLD AUTO: 77.5 %
NRBC # BLD AUTO: 0 10*3/UL
NRBC BLD AUTO-RTO: 1 /100
PLATELET # BLD AUTO: 333 10E9/L (ref 150–450)
PLATELET # BLD EST: ABNORMAL 10*3/UL
POTASSIUM SERPL-SCNC: 4.2 MMOL/L (ref 3.4–5.3)
PROMYELOCYTES # BLD MANUAL: 0 10E9/L
PROMYELOCYTES NFR BLD MANUAL: 0.9 %
PROT SERPL-MCNC: 6.4 G/DL (ref 6.8–8.8)
RBC # BLD AUTO: 3.03 10E12/L (ref 4.4–5.9)
RBC MORPH BLD: NORMAL
SODIUM SERPL-SCNC: 136 MMOL/L (ref 133–144)
SPECIMEN SOURCE: ABNORMAL
WBC # BLD AUTO: 3.1 10E9/L (ref 4–11)

## 2020-09-03 PROCEDURE — 36415 COLL VENOUS BLD VENIPUNCTURE: CPT | Performed by: STUDENT IN AN ORGANIZED HEALTH CARE EDUCATION/TRAINING PROGRAM

## 2020-09-03 PROCEDURE — 25000132 ZZH RX MED GY IP 250 OP 250 PS 637: Performed by: STUDENT IN AN ORGANIZED HEALTH CARE EDUCATION/TRAINING PROGRAM

## 2020-09-03 PROCEDURE — 85025 COMPLETE CBC W/AUTO DIFF WBC: CPT | Performed by: STUDENT IN AN ORGANIZED HEALTH CARE EDUCATION/TRAINING PROGRAM

## 2020-09-03 PROCEDURE — 25000131 ZZH RX MED GY IP 250 OP 636 PS 637: Performed by: STUDENT IN AN ORGANIZED HEALTH CARE EDUCATION/TRAINING PROGRAM

## 2020-09-03 PROCEDURE — 83735 ASSAY OF MAGNESIUM: CPT | Performed by: STUDENT IN AN ORGANIZED HEALTH CARE EDUCATION/TRAINING PROGRAM

## 2020-09-03 PROCEDURE — 25000128 H RX IP 250 OP 636: Performed by: STUDENT IN AN ORGANIZED HEALTH CARE EDUCATION/TRAINING PROGRAM

## 2020-09-03 PROCEDURE — 86352 CELL FUNCTION ASSAY W/STIM: CPT | Performed by: NURSE PRACTITIONER

## 2020-09-03 PROCEDURE — 99233 SBSQ HOSP IP/OBS HIGH 50: CPT | Performed by: NURSE PRACTITIONER

## 2020-09-03 PROCEDURE — 85049 AUTOMATED PLATELET COUNT: CPT

## 2020-09-03 PROCEDURE — 25000131 ZZH RX MED GY IP 250 OP 636 PS 637: Performed by: NURSE PRACTITIONER

## 2020-09-03 PROCEDURE — 80053 COMPREHEN METABOLIC PANEL: CPT | Performed by: STUDENT IN AN ORGANIZED HEALTH CARE EDUCATION/TRAINING PROGRAM

## 2020-09-03 PROCEDURE — 21400000 ZZH R&B CCU UMMC

## 2020-09-03 PROCEDURE — 00000146 ZZHCL STATISTIC GLUCOSE BY METER IP

## 2020-09-03 PROCEDURE — 36415 COLL VENOUS BLD VENIPUNCTURE: CPT | Performed by: NURSE PRACTITIONER

## 2020-09-03 PROCEDURE — 25000132 ZZH RX MED GY IP 250 OP 250 PS 637: Performed by: NURSE PRACTITIONER

## 2020-09-03 RX ADMIN — VALGANCICLOVIR 450 MG: 450 TABLET, FILM COATED ORAL at 08:33

## 2020-09-03 RX ADMIN — TACROLIMUS 4 MG: 1 CAPSULE ORAL at 18:02

## 2020-09-03 RX ADMIN — MYCOPHENOLATE MOFETIL 1000 MG: 500 TABLET ORAL at 08:32

## 2020-09-03 RX ADMIN — BENZOCAINE AND MENTHOL 1 LOZENGE: 15; 3.6 LOZENGE ORAL at 22:07

## 2020-09-03 RX ADMIN — CEFDINIR 300 MG: 300 CAPSULE ORAL at 19:52

## 2020-09-03 RX ADMIN — Medication 5000 UNITS: at 11:41

## 2020-09-03 RX ADMIN — INSULIN ASPART 1 UNITS: 100 INJECTION, SOLUTION INTRAVENOUS; SUBCUTANEOUS at 08:59

## 2020-09-03 RX ADMIN — CEFDINIR 300 MG: 300 CAPSULE ORAL at 08:32

## 2020-09-03 RX ADMIN — ACETAMINOPHEN 650 MG: 325 TABLET, FILM COATED ORAL at 08:57

## 2020-09-03 RX ADMIN — NYSTATIN 500000 UNITS: 500000 SUSPENSION ORAL at 19:52

## 2020-09-03 RX ADMIN — ROSUVASTATIN CALCIUM 10 MG: 10 TABLET, FILM COATED ORAL at 21:11

## 2020-09-03 RX ADMIN — Medication 1 TABLET: at 11:40

## 2020-09-03 RX ADMIN — Medication 5000 UNITS: at 22:08

## 2020-09-03 RX ADMIN — NYSTATIN 500000 UNITS: 500000 SUSPENSION ORAL at 11:41

## 2020-09-03 RX ADMIN — ACETAMINOPHEN 650 MG: 325 TABLET, FILM COATED ORAL at 01:46

## 2020-09-03 RX ADMIN — ASPIRIN 81 MG CHEWABLE TABLET 81 MG: 81 TABLET CHEWABLE at 08:32

## 2020-09-03 RX ADMIN — NYSTATIN 500000 UNITS: 500000 SUSPENSION ORAL at 08:33

## 2020-09-03 RX ADMIN — INSULIN ASPART 1 UNITS: 100 INJECTION, SOLUTION INTRAVENOUS; SUBCUTANEOUS at 12:15

## 2020-09-03 RX ADMIN — Medication 12.5 MG: at 19:52

## 2020-09-03 RX ADMIN — MAGNESIUM SULFATE IN WATER 2 G: 40 INJECTION, SOLUTION INTRAVENOUS at 11:41

## 2020-09-03 RX ADMIN — TACROLIMUS 3 MG: 1 CAPSULE ORAL at 08:33

## 2020-09-03 RX ADMIN — Medication 1 TABLET: at 21:11

## 2020-09-03 RX ADMIN — INSULIN ASPART 1 UNITS: 100 INJECTION, SOLUTION INTRAVENOUS; SUBCUTANEOUS at 20:31

## 2020-09-03 RX ADMIN — MYCOPHENOLATE MOFETIL 1000 MG: 500 TABLET ORAL at 18:02

## 2020-09-03 RX ADMIN — NYSTATIN 500000 UNITS: 500000 SUSPENSION ORAL at 15:59

## 2020-09-03 RX ADMIN — Medication 12.5 MG: at 13:54

## 2020-09-03 RX ADMIN — PANTOPRAZOLE SODIUM 40 MG: 40 TABLET, DELAYED RELEASE ORAL at 08:33

## 2020-09-03 RX ADMIN — Medication 12.5 MG: at 08:32

## 2020-09-03 ASSESSMENT — ACTIVITIES OF DAILY LIVING (ADL)
ADLS_ACUITY_SCORE: 13

## 2020-09-03 ASSESSMENT — PAIN DESCRIPTION - DESCRIPTORS: DESCRIPTORS: DISCOMFORT

## 2020-09-03 NOTE — PLAN OF CARE
Pt reported stomach pain this AM. PRN utilized and effective. Right sided intermittent jaw pain. Managed with PRN. Mag replaced. Skipped breakfast and had lunch. Continues to work with PT/OT.

## 2020-09-03 NOTE — PROGRESS NOTES
[facility]   Cardiology II Service / Advanced Heart Failure  Daily Progress Note  Date of Service: 9/3/2020      Patient: Mariusz Sharp  MRN: [id number]  Admission Date: 8/31/2020  Hospital Day # 3    Assessment and Plan: Mariusz Sharp is a 59 year old male with PMH of CAD, LV thrombus, CKD Stage III, PAF, DM Type II, and ICM s/p HM III LVAD as BTT with subsequent OHT 5/18/20. He presents with progressive SOB, cough, and fever.     Plan today:  - Appreciate Pulmonary consult.   - Repeat Tac level Saturday.   - Immuknow pending.      Fever with Cough secondary to RUL mass. Tmax 102 prior to admission. History of rhinovirus. CDIF, LA, UA, COVID, Legionella, Cryptococcus, and Strep pneumo negative. Renal US notes resolution of prior stone. RVP and Chest X-ray negative. History of PAcnes to outflow graft and Donor positive S Anginosus and Veillonella (completed course of antibiotics). Concern for Chronic Prostatitis. Vanco/Zosyn discontinued 9/1.  - Appreciate Transplant ID Consult.   - Sputum culture contaminated.  - Aspergillus, CMV, and EBV negative. Beta D glucan positive.   - Blood cultures NTD.   - Aggressive pulmonary toileting and nebs.   - CT Chest positive for RUL mass concerning for PTLD vs infection. Note symptoms worse following cessation of antibiotics.   - Appreciate Pulmonary Consult.   - Continue Cefdinir 300 mg po BID through 10/1/20.     Syncope, resolved. Occurred with cough prior to admission.   - Encourage fluids.   - Aggressive pulmonary toileting and nebs.      Diarrhea, improving.   - CDIF negative and CMV negative.      S/p OHT secondary to ICM. 5/18/20. Echo 8/31 with EF 70% and normal graft function.   RHC 8/26 with mRA-3, RV-20/2, mPA-15, mPCWP-10, AMRIT CO-4.91, AMRIT CI-2.63, biopsy negative.   Immunosuppression: Simulect 5/18 and 5/22. Tac increased to 4 mg at HS and 3 mg in the AM, (note stopped clotrimazole troches). Tac level 6.5, Goal-8-10. Cellcept 1000 mg po BID, and  "Prednisone completed 8/27/20. Repeat Tac level Saturday.   Serostatus: CMV: D+, R-, EBV: D- R+, Toxo D- R-  Prophylaxis: Valcyte. Bactrim discontinued due to hyperkalemia. Pentamidine neb last administered 7/29, no further indication given he is off steroids.    - Continue ASA and Crestor.      HTN.   - Prior to admission Norvasc on  Hold. Hydralazine 12.5 mg po TID.   - /97.     DM Type II.   - Lantus 20 units at HS.   - Novolog preprandial with medium intensity sliding scale insulin.      Elevated PSA with Concern for Chronic Prostatitis. PSA-8.21. MRI consistent with BPH.   - Continue Cefdinir 300 mg po BID through 10/1/20.  ================================================================  Interval History/ROS: He complains of worsening chills, cough, and generalized malaise this AM. He denies fever, chest pain, palpitations, nausea, vomiting, and LE edema. He is tolerating oral intake and ambulating minimal in his room.     Last 24 hr care team notes reviewed.   ROS:  4 point ROS including Respiratory, CV, GI and , other than that noted in the HPI, is negative.     Medications: Reviewed in EPIC.     Physical Exam:   BP (!) 126/93   Pulse 108   Temp 98.1  F (36.7  C) (Oral)   Resp 16   Ht 1.626 m (5' 4\")   Wt 80.1 kg (176 lb 9.6 oz)   SpO2 96%   BMI 30.31 kg/m    GENERAL: Appears alert and oriented times three.   HEENT: Eye symmetrical and free of discharge bilaterally. Mucous membranes moist and without lesions.  NECK: Supple and without lymphadenopathy. JVD below clavicular line upright   CV: RRR, S1S2 present without murmur, rub, or gallop.   RESPIRATORY: Respirations regular, even, and unlabored. Lungs CTA throughout.   GI: Soft and non distended with normoactive bowel sounds present in all quadrants. No tenderness, rebound, guarding. No organomegaly.   EXTREMITIES: No peripheral edema. 2+ bilateral pedal pulses.   NEUROLOGIC: Alert and orientated x 3. CN II-XII grossly intact. No focal " deficits.   MUSCULOSKELETAL: No joint swelling or tenderness.   SKIN: No jaundice. No rashes or lesions.     Data:  CMP  Recent Labs   Lab 09/03/20  0526 09/02/20  0551 09/01/20  0658 08/31/20  1259    137 134 130*   POTASSIUM 4.2 4.1 3.8 4.0   CHLORIDE 106 107 104 100   CO2 23 22 22 22   ANIONGAP 7 7 9 8   * 126* 118* 186*   BUN 23 24 21 33*   CR 1.13 1.32* 1.38* 1.60*   GFRESTIMATED 71 59* 56* 47*   GFRESTBLACK 83 69 65 54*   ARLENE 8.6 8.5 8.5 9.6   MAG 1.6 1.9 1.4*  --    PROTTOTAL 6.4* 6.0* 6.1* 7.3   ALBUMIN 2.5* 2.3* 2.4* 3.1*   BILITOTAL 0.4 0.5 0.9 0.8   ALKPHOS 66 58 62 76   AST 19 19 16 21   ALT 16 14 11 14     CBC  Recent Labs   Lab 09/03/20  0526 09/02/20  0551 09/01/20  0658 08/31/20  1259   WBC 3.1* 2.8* 3.4* 4.2   RBC 3.03* 2.95* 2.91* 3.50*   HGB 8.3* 8.1* 7.9* 9.7*   HCT 27.0* 26.7* 26.0* 31.3*   MCV 89 91 89 89   MCH 27.4 27.5 27.1 27.7   MCHC 30.7* 30.3* 30.4* 31.0*   RDW 14.1 14.3 14.5 14.6    354 336 374     Patient discussed with Dr. Stone.      Antonia Byrne FNP  9/3/2020

## 2020-09-03 NOTE — PLAN OF CARE
AVSS.  A&OX4.  Tenderness behind R earlobe/R side of face.  Pt states had addressed with Dr. Ortiz on past admission and has been experiencing same sensation at home.  Pain relieved with Tylenol/cold pack.  No c/o SOB/nausea.  No fevers or loose stools this shift. Continue current POC.  Follow s/s, source of pain.  Infectious workup.  Notify Cards 2 team with new findings/status changes.

## 2020-09-03 NOTE — CONSULTS
PULMONARY CONSULT  Date of service: 9/3/2020    Patient: Mariusz Sharp      : 1960      MRN: [id number]    We were consulted by Dr. Antonia Byrne, ANNE-MARIE for evaluation of lung mass.        Impressions/Recommendations:   59 year old male with PMHx most significant for OHT, he was admitted due to cough, fever and syncope.  His work up showed RUL mass with postobstructive pneumonia.  Infectious vs malignancy (PTLD given the transplant status)      RUL mass vs consolidation and postobstructive pneumonia   OHT on immune suppression    - Abx management per primary and transplant ID    - Recommend consultation to IP, reviewed with the team, he will be a candidate for EBUS lymph node sampling    - We will sign off, please call if any further question or concern    - Discussed with primary team       Patient seen & discussed w/  Dr. Elisha M.D., who is in agreement.     Te Santa MD  Pulmonary & Critical Care Fellow  [phone]          History of Present Illness:   Mariusz Sharp is a 59 year old male who presented to [facility] on 2020 with complaints of LOC after coughing spell.  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  Patient has been feeling SOB for the last 3-4 weeks.  He did great after the transplant surgery and he was able to increase his activity level to walk up to 5 miles without shortness of breath or problem.  Then 3-4 weeks ago he noticed that he started to get SOB after walking 2-3 blocks which is unusual for him.  On the , he started having cough, no sputum production and he was seen by his doctors and workup revealed CXR showing retrocardiac opacity concerning for CAP and respiratory panel was positive for rhinovirus, he was started on Abx, he felt some improvement then he started having bad days  again with cough.  On 8/31 he was admitted to the cardiology service after he had LOC after a coughing spell.  He had fever on that day, his temp was 101.3.  He also felt weak and had flushing in his head.  He denied any night sweats, no weight loss or loss of appetite.  On admission, he was started on IV Vanco and Zosyn and ID was consulted.  He was transitioned to oral cefdinir, sputum sample was not of good quality  With > 10 squamous epi cells.  Aspergillus Gallactomannan was negative, fungitell is positive.  Respiratory PCR is negative.  From he OHT, Echo 8/31 with EF 70% and normal graft function, RHC was done on 8/26, there was biopsy focal mild rejection, Grade 1R/1A. However, unclear the clinical significance of the biopsy findings.  CT scan chest was done and showed new mass vs consolidation in the right upper lobe bronchi and right hilum compared to 7/2020.  DDx infections vs malignancy (lymphoma/PTLD).      Patient is a never smoker. Patient reports no EtOH use. Patient reports no recreational drug use.           Review of Symptoms:   10-point ROS reviewed, & found negative w/ exceptions noted in the HPI.          Past Medical History:     Past Medical History:   Diagnosis Date     Acute kidney injury (H)      CKD (chronic kidney disease)      Coronary artery disease      Diabetes mellitus (H)      HTN (hypertension)      Hyperlipidemia      ICD (implantable cardioverter-defibrillator), single chamber- NOT dependent 7/17/2018     Ischemic cardiomyopathy      LV (left ventricular) mural thrombus      Paroxysmal atrial flutter (H)      RVF (right ventricular failure) (H)      Systolic heart failure (H)      Ventricular tachycardia (H)        Past Surgical History:   Procedure Laterality Date     COLONOSCOPY N/A 12/7/2018    Procedure: COLONOSCOPY;  Surgeon: Krish Mercado MD;  [facility]: [facility]     CV HEART BIOPSY N/A 5/24/2020    Procedure: Heart Biopsy;  Surgeon: Kit Bacon MD;   [facility] HE[facility] CARD[facility]AC C[facility] LAB     CV HEART BIOPSY N/A 6/1/2020    Procedure: CV HEART BIOPSY;  Surgeon: Kit Bacon MD;  [facility]     CV HEART BIOPSY N/A 6/8/2020    Procedure: CV HEART BIOPSY;  Surgeon: Kit Bacon MD;  [facility]     CV HEART BIOPSY N/A 6/15/2020    Procedure: CV HEART BIOPSY;  Surgeon: Kit Bacon MD;  [facility]: [facility]     CV HEART BIOPSY N/A 6/30/2020    Procedure: CV HEART BIOPSY;  Surgeon: Kit Bacon MD;  [facility]     CV HEART BIOPSY N/A 7/14/2020    Procedure: CV HEART BIOPSY;  Surgeon: Brian Long MD;  [facility]     CV HEART BIOPSY N/A 7/29/2020    Procedure: CV HEART BIOPSY;  Surgeon: Momo Hunt MD;  [facility]     CV HEART BIOPSY N/A 8/13/2020    Procedure: CV HEART BIOPSY;  Surgeon: Khris Mcclelland MD;  [facility]     CV HEART BIOPSY N/A 8/26/2020    Procedure: CV HEART BIOPSY;  Surgeon: Momo Hunt MD;  Location:  HEART CARDIAC CATH LAB     CV RIGHT HEART CATH N/A 2/19/2019    Procedure: RHC;  Surgeon: Brian Long MD;  [facility]     CV RIGHT HEART CATH N/A 7/5/2019    Procedure: CV RIGHT HEART CATH;  Surgeon: Khris Mcclelland MD;  Location:  HEART CARDIAC CATH LAB     CV RIGHT HEART CATH N/A 5/24/2020    Procedure: Right Heart Cath;  Surgeon: Kit Bacon MD;  [facility]     CV RIGHT HEART CATH N/A 6/1/2020    Procedure: CV RIGHT HEART CATH;  Surgeon: Kit Bacon MD;  [facility]     CV RIGHT HEART CATH N/A 6/8/2020    Procedure: CV RIGHT HEART CATH;  Surgeon: Kit Bacon MD;  Location:  HEART CARDIAC CATH LAB     CV RIGHT HEART CATH N/A 6/15/2020     Procedure: CV RIGHT HEART CATH;  Surgeon: Kit Bacon MD;  Location:  HEART CARDIAC CATH LAB     CV RIGHT HEART CATH N/A 6/30/2020    Procedure: CV RIGHT HEART CATH;  Surgeon: Kit Bacon MD;  Location:  HEART CARDIAC CATH LAB     CV RIGHT HEART CATH N/A 7/14/2020    Procedure: CV RIGHT HEART CATH;  Surgeon: Brian Long MD;  Location:  HEART CARDIAC CATH LAB     CV RIGHT HEART CATH N/A 7/29/2020    Procedure: CV RIGHT HEART CATH;  Surgeon: Momo Hunt MD;  Location:  HEART CARDIAC CATH LAB     CV RIGHT HEART CATH N/A 8/13/2020    Procedure: CV RIGHT HEART CATH;  Surgeon: Khris Mcclelland MD;  Location:  HEART CARDIAC CATH LAB     CV RIGHT HEART CATH N/A 8/26/2020    Procedure: CV RIGHT HEART CATH;  Surgeon: Momo Hunt MD;  Location:  HEART CARDIAC CATH LAB     HC PRQ TRLUML CORONARY ANGIOPLASTY ADDL BRANCH      PCI and ALYSSA to ostial LAD on 6/27/18     IMPLANT AUTOMATIC IMPLANTABLE CARDIOVERTER DEFIBRILLATOR       INSERT VENTRICULAR ASSIST DEVICE LEFT (HEARTMATE II) N/A 12/31/2018    Procedure: Median Sternotomy, On Cardiopulmonary Bypass Pump, Insertion of Left Ventricular Assist Device (Heartmate III);  Surgeon: Kamaljit Baker MD;  [facility]     PICC DOUBLE LUMEN PLACEMENT Right 05/22/2020    5FR DL PICC inserted at right basilic vein, length 38cm.     TRANSPLANT HEART RECIPIENT AFTER HEART MATE N/A 5/18/2020    Procedure: REDO MEDIAN STERNOTOMY, LYSIS OF ADHESIONS, ON CARDIOPULMONARY BYPASS PUMP, HEART TRANSPLANT RECIPIENT, REMOVAL OF LEFT VENTRICULAR ASSIST DEVICE, REMOVAL OF AUTOMATED IMPLANTABLE CARDIOVERTER DEFIBRILLATOR;  Surgeon: Elder Willis MD;  Location:  OR            Allergies:     No Known Allergies          Outpatient Medications:     No current facility-administered medications on file prior to encounter.   acetaminophen (TYLENOL) 325 MG tablet, Take 2 tablets (650 mg) by mouth every 4 hours as needed  for other (multimodal surgical pain management along with NSAIDS and opioid medication as indicated based on pain control and physical function.)  amLODIPine (NORVASC) 5 MG tablet, Take 2 tablets (10 mg) by mouth At Bedtime  aspirin (ASA) 81 MG chewable tablet, Take 1 tablet (81 mg) by mouth daily  calcium carbonate 600 mg-vitamin D 400 units (CALTRATE) 600-400 MG-UNIT per tablet, Take 1 tablet by mouth 2 times daily (with meals)  cefdinir (OMNICEF) 300 MG capsule, Take 1 capsule (300 mg) by mouth 2 times daily  Continuous Blood Gluc Transmit (DEXCOM G6 TRANSMITTER) MISC, 1 each every 3 months  hydrALAZINE (APRESOLINE) 25 MG tablet, Take 1 tablet (25 mg) by mouth 3 times daily  insulin aspart (NOVOLOG PEN) 100 UNIT/ML pen, 1 unit / 8  gms CHO with meals,plus correction insulin. Pt uses approx 40 units in 24 hrs.  insulin aspart (NOVOLOG PEN) 100 UNIT/ML pen, Correction Scale - HIGH INSULIN RESISTANCE DOSING     Do Not give Correction Insulin if Pre-Meal BG less than 140.   For Pre-Meal  - 164 give 1 unit.   For Pre-Meal  - 189 give 2 units.   For Pre-Meal  - 214 give 3 units.   For Pre-Meal  - 239 give 4 units.   For Pre-Meal  - 264 give 5 units.   For Pre-Meal  - 289 give 6 units.   For Pre-Meal  - 314 give 7 units.   For Pre-Meal  - 339 give 8 units.   For Pre-Meal  - 364 give 9 units.   For Pre-Meal BG greater than or equal to 365 give 10 units  To be given with prandial insulin, and based on pre-meal blood glucose.   Notify provider if glucose greater than or equal to 350 mg/dL after administration of correction dose. If given at mealtime, administer within 30 minutes of start of meal  insulin aspart (NOVOLOG PEN) 100 UNIT/ML pen, Inject 1-7 Units Subcutaneous At Bedtime HIGH INSULIN RESISTANCE DOSING    Do Not give Bedtime Correction Insulin if BG less than 200.   For  - 224 give 1 units.   For  - 249 give 2 units.   For  - 274 give 3  "units.   For  - 299 give 4 units.   For  - 324 give 5 units.   For  - 349 give 6 units.   For BG greater than or equal to 350 give 7 units.   Notify provider if glucose greater than or equal to 350 mg/dL after administration of correction dose. If given at mealtime, administer within 30 minutes of start of meal  insulin glargine (LANTUS PEN) 100 UNIT/ML pen, Inject 32 Units Subcutaneous every evening Inject 32 units subcutaneous daily.  magnesium oxide (MAG-OX) 400 (241.3 Mg) MG tablet, Take 1 tablet (400 mg) by mouth 2 times daily  mycophenolate (GENERIC EQUIVALENT) 500 MG tablet, Take 2 tablets (1,000 mg) by mouth 2 times daily  rosuvastatin (CRESTOR) 10 MG tablet, TAKE ONE TABLET BY MOUTH ONCE DAILY  tacrolimus (GENERIC EQUIVALENT) 1 MG capsule, Take 3 capsules (3 mg) by mouth 2 times daily Take 3mg BID  valGANciclovir (VALCYTE) 450 MG tablet, TAKE ONE TABLET (450MG) BY MOUTH DAILY  ACCU-CHEK [place] PLUS test strip, Check BG 3 times daily at meals and at bedtime.  BD SILVANA U/F 32G X 4 MM insulin pen needle, Use 3-4 daily.  Continuous Blood Gluc  (DEXCOM G6 ) DON, 1 each daily  Continuous Blood Gluc Sensor (DEXCOM G6 SENSOR) MISC, 1 each every 10 days              Family History:   No family history for cancer           Social History:     Social History     Tobacco Use     Smoking status: Never Smoker     Smokeless tobacco: Never Used   Substance Use Topics     Alcohol use: No     Frequency: Never     Drug use: No             Physical Exam:   BP (!) 125/93 (BP Location: Left arm)   Pulse 126   Temp 98.3  F (36.8  C) (Oral)   Resp 18   Ht 1.626 m (5' 4\")   Wt 80.1 kg (176 lb 9.6 oz)   SpO2 97%   BMI 30.31 kg/m      General: NAD  HEENT: Anicteric sclera, EOMI, MMM, OP unobstructed, w/o erythema/discharge; no cervical LAD  CV: RRR, no m/r/g  Lungs: right upper lobe wheezing, otherwise clear   Abd: Soft, NT, ND  Ext: WWP,  No LE edema  Skin: No rashes, cyanosis, or " jaundice  Neuro: AAOx3, no focal deficits          Data:   Labs (all laboratory studies reviewed by me):   WBC 3.1, Hb 8.3, Plt 333  Sodium 136, K 4.2, bicarb 23, cr 1.13, LFT is normal     Imaging (all imaging studies reviewed by me):  CT CHEST W/O CONTRAST, 9/2/2020  1. New mass vs consolidation in the right upper lobe and right hilum  since 7/29/2020 with narrowing of multiple right upper lobe bronchi.   1a. Differential pneumonia with reactive right hilar lymphadenopathy  vs. malignancy(ex. Lymphoma/PTLD) with post obstructive pneumonia.     2. Stable additional pulmonary lesions as above.     3. Stable postsurgical changes of heart transplant.     4. Cholelithiasis without evidence of cholecystitis.    Procedures:   None

## 2020-09-04 ENCOUNTER — DOCUMENTATION ONLY (OUTPATIENT)
Dept: CARDIOLOGY | Facility: CLINIC | Age: 60
End: 2020-09-04

## 2020-09-04 ENCOUNTER — APPOINTMENT (OUTPATIENT)
Dept: OCCUPATIONAL THERAPY | Facility: CLINIC | Age: 60
End: 2020-09-04
Payer: COMMERCIAL

## 2020-09-04 LAB
ALBUMIN SERPL-MCNC: 2.6 G/DL (ref 3.4–5)
ALP SERPL-CCNC: 66 U/L (ref 40–150)
ALT SERPL W P-5'-P-CCNC: 12 U/L (ref 0–70)
ANION GAP SERPL CALCULATED.3IONS-SCNC: 8 MMOL/L (ref 3–14)
ANISOCYTOSIS BLD QL SMEAR: SLIGHT
AST SERPL W P-5'-P-CCNC: 21 U/L (ref 0–45)
BASOPHILS # BLD AUTO: 0 10E9/L (ref 0–0.2)
BASOPHILS NFR BLD AUTO: 0.9 %
BILIRUB SERPL-MCNC: 0.8 MG/DL (ref 0.2–1.3)
BUN SERPL-MCNC: 17 MG/DL (ref 7–30)
CALCIUM SERPL-MCNC: 9 MG/DL (ref 8.5–10.1)
CHLORIDE SERPL-SCNC: 104 MMOL/L (ref 94–109)
CO2 SERPL-SCNC: 22 MMOL/L (ref 20–32)
CREAT SERPL-MCNC: 1.16 MG/DL (ref 0.66–1.25)
CRYPTOC AG SPEC QL: NORMAL
DIFFERENTIAL METHOD BLD: ABNORMAL
EOSINOPHIL # BLD AUTO: 0.2 10E9/L (ref 0–0.7)
EOSINOPHIL NFR BLD AUTO: 4.3 %
ERYTHROCYTE [DISTWIDTH] IN BLOOD BY AUTOMATED COUNT: 14.2 % (ref 10–15)
GFR SERPL CREATININE-BSD FRML MDRD: 69 ML/MIN/{1.73_M2}
GLUCOSE BLDC GLUCOMTR-MCNC: 106 MG/DL (ref 70–99)
GLUCOSE BLDC GLUCOMTR-MCNC: 154 MG/DL (ref 70–99)
GLUCOSE BLDC GLUCOMTR-MCNC: 209 MG/DL (ref 70–99)
GLUCOSE BLDC GLUCOMTR-MCNC: 211 MG/DL (ref 70–99)
GLUCOSE BLDC GLUCOMTR-MCNC: 261 MG/DL (ref 70–99)
GLUCOSE SERPL-MCNC: 115 MG/DL (ref 70–99)
HCT VFR BLD AUTO: 27.5 % (ref 40–53)
HGB BLD-MCNC: 8.7 G/DL (ref 13.3–17.7)
LYMPHOCYTES # BLD AUTO: 0.3 10E9/L (ref 0.8–5.3)
LYMPHOCYTES NFR BLD AUTO: 8.5 %
MAGNESIUM SERPL-MCNC: 1.7 MG/DL (ref 1.6–2.3)
MCH RBC QN AUTO: 27.6 PG (ref 26.5–33)
MCHC RBC AUTO-ENTMCNC: 31.6 G/DL (ref 31.5–36.5)
MCV RBC AUTO: 87 FL (ref 78–100)
MONOCYTES # BLD AUTO: 0.2 10E9/L (ref 0–1.3)
MONOCYTES NFR BLD AUTO: 6 %
NEUTROPHILS # BLD AUTO: 3.1 10E9/L (ref 1.6–8.3)
NEUTROPHILS NFR BLD AUTO: 80.3 %
PLATELET # BLD AUTO: 376 10E9/L (ref 150–450)
PLATELET # BLD EST: ABNORMAL 10*3/UL
POIKILOCYTOSIS BLD QL SMEAR: SLIGHT
POTASSIUM SERPL-SCNC: 3.9 MMOL/L (ref 3.4–5.3)
PROT SERPL-MCNC: 6.5 G/DL (ref 6.8–8.8)
RBC # BLD AUTO: 3.15 10E12/L (ref 4.4–5.9)
SODIUM SERPL-SCNC: 134 MMOL/L (ref 133–144)
SPECIMEN SOURCE: NORMAL
TACROLIMUS BLD-MCNC: 6.8 UG/L (ref 5–15)
TME LAST DOSE: NORMAL H
WBC # BLD AUTO: 3.8 10E9/L (ref 4–11)

## 2020-09-04 PROCEDURE — 25000131 ZZH RX MED GY IP 250 OP 636 PS 637: Performed by: STUDENT IN AN ORGANIZED HEALTH CARE EDUCATION/TRAINING PROGRAM

## 2020-09-04 PROCEDURE — 25000132 ZZH RX MED GY IP 250 OP 250 PS 637: Performed by: STUDENT IN AN ORGANIZED HEALTH CARE EDUCATION/TRAINING PROGRAM

## 2020-09-04 PROCEDURE — 87449 NOS EACH ORGANISM AG IA: CPT | Performed by: INTERNAL MEDICINE

## 2020-09-04 PROCEDURE — 21400000 ZZH R&B CCU UMMC

## 2020-09-04 PROCEDURE — 80197 ASSAY OF TACROLIMUS: CPT | Performed by: STUDENT IN AN ORGANIZED HEALTH CARE EDUCATION/TRAINING PROGRAM

## 2020-09-04 PROCEDURE — 85025 COMPLETE CBC W/AUTO DIFF WBC: CPT | Performed by: STUDENT IN AN ORGANIZED HEALTH CARE EDUCATION/TRAINING PROGRAM

## 2020-09-04 PROCEDURE — 87385 HISTOPLASMA CAPSUL AG IA: CPT | Performed by: INTERNAL MEDICINE

## 2020-09-04 PROCEDURE — 83735 ASSAY OF MAGNESIUM: CPT | Performed by: STUDENT IN AN ORGANIZED HEALTH CARE EDUCATION/TRAINING PROGRAM

## 2020-09-04 PROCEDURE — 36415 COLL VENOUS BLD VENIPUNCTURE: CPT | Performed by: STUDENT IN AN ORGANIZED HEALTH CARE EDUCATION/TRAINING PROGRAM

## 2020-09-04 PROCEDURE — 86606 ASPERGILLUS ANTIBODY: CPT | Performed by: INTERNAL MEDICINE

## 2020-09-04 PROCEDURE — 25000125 ZZHC RX 250: Performed by: NURSE PRACTITIONER

## 2020-09-04 PROCEDURE — 86698 HISTOPLASMA ANTIBODY: CPT | Performed by: INTERNAL MEDICINE

## 2020-09-04 PROCEDURE — 36415 COLL VENOUS BLD VENIPUNCTURE: CPT | Performed by: INTERNAL MEDICINE

## 2020-09-04 PROCEDURE — 87899 AGENT NOS ASSAY W/OPTIC: CPT | Performed by: INTERNAL MEDICINE

## 2020-09-04 PROCEDURE — 97535 SELF CARE MNGMENT TRAINING: CPT | Mod: GO

## 2020-09-04 PROCEDURE — 86612 BLASTOMYCES ANTIBODY: CPT | Performed by: INTERNAL MEDICINE

## 2020-09-04 PROCEDURE — 25000128 H RX IP 250 OP 636: Performed by: STUDENT IN AN ORGANIZED HEALTH CARE EDUCATION/TRAINING PROGRAM

## 2020-09-04 PROCEDURE — 94640 AIRWAY INHALATION TREATMENT: CPT | Mod: 76

## 2020-09-04 PROCEDURE — 97110 THERAPEUTIC EXERCISES: CPT | Mod: GO

## 2020-09-04 PROCEDURE — 25000131 ZZH RX MED GY IP 250 OP 636 PS 637: Performed by: NURSE PRACTITIONER

## 2020-09-04 PROCEDURE — 40000275 ZZH STATISTIC RCP TIME EA 10 MIN

## 2020-09-04 PROCEDURE — 00000146 ZZHCL STATISTIC GLUCOSE BY METER IP

## 2020-09-04 PROCEDURE — 99233 SBSQ HOSP IP/OBS HIGH 50: CPT | Performed by: NURSE PRACTITIONER

## 2020-09-04 PROCEDURE — 25000132 ZZH RX MED GY IP 250 OP 250 PS 637: Performed by: NURSE PRACTITIONER

## 2020-09-04 PROCEDURE — 80053 COMPREHEN METABOLIC PANEL: CPT | Performed by: STUDENT IN AN ORGANIZED HEALTH CARE EDUCATION/TRAINING PROGRAM

## 2020-09-04 PROCEDURE — 86635 COCCIDIOIDES ANTIBODY: CPT | Performed by: INTERNAL MEDICINE

## 2020-09-04 RX ORDER — VALGANCICLOVIR 450 MG/1
900 TABLET, FILM COATED ORAL DAILY
Status: DISCONTINUED | OUTPATIENT
Start: 2020-09-05 | End: 2020-09-05

## 2020-09-04 RX ORDER — TACROLIMUS 1 MG/1
2 CAPSULE ORAL
Status: DISCONTINUED | OUTPATIENT
Start: 2020-09-04 | End: 2020-09-06

## 2020-09-04 RX ORDER — POSACONAZOLE 100 MG/1
300 TABLET, DELAYED RELEASE ORAL 2 TIMES DAILY WITH MEALS
Status: COMPLETED | OUTPATIENT
Start: 2020-09-04 | End: 2020-09-04

## 2020-09-04 RX ORDER — POSACONAZOLE 100 MG/1
300 TABLET, DELAYED RELEASE ORAL DAILY
Status: DISCONTINUED | OUTPATIENT
Start: 2020-09-05 | End: 2020-09-12

## 2020-09-04 RX ADMIN — TACROLIMUS 2 MG: 1 CAPSULE ORAL at 17:41

## 2020-09-04 RX ADMIN — CEFDINIR 300 MG: 300 CAPSULE ORAL at 07:48

## 2020-09-04 RX ADMIN — ACETAMINOPHEN 650 MG: 325 TABLET, FILM COATED ORAL at 07:47

## 2020-09-04 RX ADMIN — ROSUVASTATIN CALCIUM 10 MG: 10 TABLET, FILM COATED ORAL at 22:50

## 2020-09-04 RX ADMIN — ASPIRIN 81 MG CHEWABLE TABLET 81 MG: 81 TABLET CHEWABLE at 07:48

## 2020-09-04 RX ADMIN — TACROLIMUS 3 MG: 1 CAPSULE ORAL at 07:48

## 2020-09-04 RX ADMIN — NYSTATIN 500000 UNITS: 500000 SUSPENSION ORAL at 15:12

## 2020-09-04 RX ADMIN — POSACONAZOLE 300 MG: 100 TABLET, DELAYED RELEASE ORAL at 17:41

## 2020-09-04 RX ADMIN — Medication 12.5 MG: at 19:48

## 2020-09-04 RX ADMIN — Medication 5000 UNITS: at 22:50

## 2020-09-04 RX ADMIN — VALGANCICLOVIR 450 MG: 450 TABLET, FILM COATED ORAL at 07:48

## 2020-09-04 RX ADMIN — NYSTATIN 500000 UNITS: 500000 SUSPENSION ORAL at 11:50

## 2020-09-04 RX ADMIN — NYSTATIN 500000 UNITS: 500000 SUSPENSION ORAL at 19:48

## 2020-09-04 RX ADMIN — NYSTATIN 500000 UNITS: 500000 SUSPENSION ORAL at 07:51

## 2020-09-04 RX ADMIN — INSULIN ASPART 2 UNITS: 100 INJECTION, SOLUTION INTRAVENOUS; SUBCUTANEOUS at 13:57

## 2020-09-04 RX ADMIN — Medication 12.5 MG: at 15:11

## 2020-09-04 RX ADMIN — Medication 5000 UNITS: at 10:43

## 2020-09-04 RX ADMIN — CEFDINIR 300 MG: 300 CAPSULE ORAL at 19:47

## 2020-09-04 RX ADMIN — MYCOPHENOLATE MOFETIL 1000 MG: 500 TABLET ORAL at 07:48

## 2020-09-04 RX ADMIN — INSULIN ASPART 9 UNITS: 100 INJECTION, SOLUTION INTRAVENOUS; SUBCUTANEOUS at 11:01

## 2020-09-04 RX ADMIN — MAGNESIUM SULFATE IN WATER 2 G: 40 INJECTION, SOLUTION INTRAVENOUS at 10:38

## 2020-09-04 RX ADMIN — Medication 1 TABLET: at 10:41

## 2020-09-04 RX ADMIN — MYCOPHENOLATE MOFETIL 1000 MG: 500 TABLET ORAL at 17:41

## 2020-09-04 RX ADMIN — PANTOPRAZOLE SODIUM 40 MG: 40 TABLET, DELAYED RELEASE ORAL at 07:48

## 2020-09-04 RX ADMIN — ALBUTEROL SULFATE 2.5 MG: 2.5 SOLUTION RESPIRATORY (INHALATION) at 11:47

## 2020-09-04 RX ADMIN — POTASSIUM CHLORIDE 20 MEQ: 750 TABLET, EXTENDED RELEASE ORAL at 10:42

## 2020-09-04 RX ADMIN — Medication 12.5 MG: at 07:48

## 2020-09-04 RX ADMIN — Medication 1 TABLET: at 22:50

## 2020-09-04 RX ADMIN — POSACONAZOLE 300 MG: 100 TABLET, DELAYED RELEASE ORAL at 11:35

## 2020-09-04 RX ADMIN — INSULIN ASPART 3 UNITS: 100 INJECTION, SOLUTION INTRAVENOUS; SUBCUTANEOUS at 18:43

## 2020-09-04 RX ADMIN — ALBUTEROL SULFATE 2.5 MG: 2.5 SOLUTION RESPIRATORY (INHALATION) at 17:28

## 2020-09-04 ASSESSMENT — ACTIVITIES OF DAILY LIVING (ADL)
ADLS_ACUITY_SCORE: 13

## 2020-09-04 ASSESSMENT — MIFFLIN-ST. JEOR: SCORE: 1527.53

## 2020-09-04 ASSESSMENT — PAIN DESCRIPTION - DESCRIPTORS: DESCRIPTORS: HEADACHE;SORE

## 2020-09-04 NOTE — PROVIDER NOTIFICATION
Pt c/o SOB at rest, started about ~1015 per pt. Sats 97-99% on RA. Denies pain, no CP. /75. RR 18. Antonia MAZARIEGOS text paged regarding.    Will continue to monitor pt.

## 2020-09-04 NOTE — PROGRESS NOTES
[facility]  Transplant Infectious Disease Consult Note - New Patient     Patient:  Mariusz Sharp, Date of birth 1960, Medical record number [id number]  Date of Visit:  09/04/2020  Consult requested by Dr. Nasreen Nielson for evaluation of fevers         Assessment and Recommendations:   Recommendations:  --Continuing cefdinir   --Start PO Posaconazole 300 mg twice daily on day 1; Maintenance: 300 mg once daily pending infectious work up   --Please check posaconazole trough level on day 5, goal is >1.0   --Would need adjustment of tacrolimus dose (~1/3rd dose reduction) due to drug-drug interaction    --Recommend pulmonary consult for bronch with BAL w/ immunocompromised studies and possible tissue biopsy   --Would send for histopath, AFB, bacterial and fungal stains    --Send for bacterial, AFB and fungal cultures   --If able to get additional tissue, please send fresh sample for fungal DNA 28S rRNA    --Ordered non-invasive fungal work up, urine and serum histoplasma Ag, urine and serum blastomyces Ag, fungal antibody panel, serum cryptococcal Ag.  --PCP prophylaxis-dc'd as off prednisone. prior Inhaled pentamidine-last dose 7/29/2020. Bactrim was discontinued 5/27/2020 2/2 hyperkalemia,   --Viral serostatus & prophylaxis: Valcyte    Thank you very much for this consultation. Transplant Infectious Disease will continue to follow with you.    Lincoln Hightower MD    Transplant Infectious Disease  Pager [phone]    Problem List/Assessment:  1. Fevers -unclear source, resolved inpatient  2. RUL and right hilum mass vs consolidation-c/f infection vs malignancy.  3. Low positive fungitell at 90, prior 85 on   4. Mild diarrhea  5. Prostatitis - on cefdinir x 6 weeks thru 10/1/20.  6. ICM s/p LVAD  And subsequent OHT 5/18/20   -CMV D+/R-, EBV D-/R+, HSV1?/2?, VZV +, Toxo D-/R-   -Maintenance immunosuppression-cellcept, tacrolimus, pred  7. Rhinovirus shedding   8. Donor Blood Cx  w/ S anginosus and Veillonella sp in 1/2 sets on 5/16/20 (suspected contaminant)-s/p 7 days antibiotics  9. CKD  10. DMII    Patient presented with fevers of 101.5 at home (none while inpatient), worsening persistent cough associated with syncope and progressive dyspnea and fatigue, although remains on room air. CXR was clear but due to symptoms, we ordered a CT chest which shows RUL and right hilum mass vs consolidations with narrowing of multiple RUL bronchi--this is c/f either an infectious process vs malignancy including PTLD. He has had a low positive fungitell on 8/19 and 8/31--unclear significance. However, given his symptoms and CT chest findings, is somewhat c/f for atypical infections, delicia fungal (as well as AFB and other atypical bacteria). As a result, I would recommend further evaluation with bronchoscopy with BAL for immunocompromised studies and possible tissue biopsies and in the meantime start anti-mold coverage pending work up as outlined above. Malignancy/ PTLD also possible but EBV PCR in serum is negative.    Other infectious work up negative thus far including COVID-19 testing, serum aspergillus Ag, serum CMV and EBV PCRs, Urine strep and legionella and blood cx. Unable able to collect adequate sputum sample for bacterial Cx, but PCP PCR pending. However, would expect fungitell to be significantly higher for PCP and low positive fungitell makes it less likely        Interval History:   --Patient underwent CT chest which does show RUL and right hilum mass vs consolidations with narrowing of multiple RUL bronchi c/f infection vs malignnacy  --Otherwise no acute events. Afebrile, HDS    Transplants:  5/18/2020 (Heart), Postoperative day:  109.  Coordinator Angelika Muller    Microbiology:  9/2/20 Sputum PCP PCR pending  9/1/20 Urine legionella Ag: negative  9/1/20 Serum CMV PCR pending  9/1/20 Serum EBV PCR pending  9/1/20 Serum cryptococcal Ag negative  9/1/20 Urine strep Ag  negative  20 Serum Fungitell: 90  20 Serum Aspergillus A.05  20 COVID-19 negative  20 Resp Viral panel negative  20 Blood Cx: NGTD    Prior Micro:  20 Urine Cx: NG  20 Sputum Cx: Normal Mary  20 Urine Cx: NG  20 Serum Fungitell 86  20 Resp viral panel + Human Rhinovirus/Enterovirus  20 Blood Cx: NG  20 Blood Cx: NG      Review of Systems: 10 point ROS unremarkable unless stated otherwise in HPI.         Current Medications & Allergies:       aspirin  81 mg Oral Daily     calcium carbonate 600 mg-vitamin D 400 units  1 tablet Oral BID w/meals     cefdinir  300 mg Oral Q12H CHANCE     heparin ANTICOAGULANT  5,000 Units Subcutaneous Q12H     hydrALAZINE  12.5 mg Oral TID     insulin aspart  1-7 Units Subcutaneous TID AC     insulin aspart  1-5 Units Subcutaneous At Bedtime     insulin aspart   Subcutaneous QAM AC     insulin aspart   Subcutaneous Daily with lunch     insulin aspart   Subcutaneous Daily with supper     insulin glargine  24 Units Subcutaneous At Bedtime     mycophenolate  1,000 mg Oral BID IS     nystatin  500,000 Units Oral 4x Daily     pantoprazole  40 mg Oral QAM AC     rosuvastatin  10 mg Oral At Bedtime     sodium chloride (PF)  3 mL Intracatheter Q8H     tacrolimus  3 mg Oral QAM     tacrolimus  4 mg Oral QPM     valGANciclovir  450 mg Oral Daily       No Known Allergies         Physical Exam:     Ranges for vital signs:  Temp:  [98  F (36.7  C)-98.7  F (37.1  C)] 98.2  F (36.8  C)  Pulse:  [108-122] 114  Resp:  [16-18] 18  BP: (105-142)/(74-98) 109/88  SpO2:  [89 %-98 %] 95 %  Vitals:    20 2258 20 0657 20 0548   Weight: 79.9 kg (176 lb 3.2 oz) 80.1 kg (176 lb 9.6 oz) 79.2 kg (174 lb 8 oz)       Physical Examination:  GENERAL:  well-developed, well-nourished, in bed in no acute distress.  HEAD:  Head is normocephalic, atraumatic   EYES:  Eyes have anicteric sclerae without conjunctival injection   ENT:  Oropharynx is  moist without exudates or ulcers. Tongue is midline  NECK:  Supple. No cervical lymphadenopathy  LUNGS:  Clear to auscultation bilateral.   CARDIOVASCULAR:  Regular rate and rhythm with no murmurs, gallops or rubs.  ABDOMEN:  Normal bowel sounds, soft, nontender. No appreciable hepatosplenomegaly.  SKIN:  No acute rashes.  Line in place without any surrounding erythema or exudate.  NEUROLOGIC:  Grossly nonfocal. Active x4 extremities         Laboratory Data:       Inflammatory Markers    Recent Labs   Lab Test 08/19/20  1759  07/23/18  0645   CRP  --   --  11.0*   PSA 10.80*   < >  --     < > = values in this interval not displayed.     Metabolic Studies       Recent Labs   Lab Test 09/01/20  0658 08/31/20  1259 08/26/20  0939 08/19/20  0558 06/15/20  0826    130* 137 139 139   POTASSIUM 3.8 4.0 4.1 3.6 4.8   CHLORIDE 104 100 105 107 110*   CO2 22 22 25 25 21   ANIONGAP 9 8 7 6 8   BUN 21 33* 41* 27 98*   CR 1.38* 1.60* 1.91* 1.31* 1.89*   GFRESTIMATED 56* 47* 38* 60* 38*   * 186* 164* 69* 120*   A1C  --   --   --  7.4* 7.8*   ARLENE 8.5 9.6 9.3 8.4* 9.0   PHOS  --   --  3.0  --  4.4   MAG 1.4*  --  1.7  --  1.7   LACT 0.9 1.5  --   --   --    PCAL  --  0.23  --   --   --    FGTL  --   --   --   --   --    CKT  --   --   --   --  132    < > = values in this interval not displayed.       Hepatic Studies    Recent Labs   Lab Test 09/01/20  0658 08/31/20  1259 08/26/20  0939 07/29/20  0902   BILITOTAL 0.9 0.8 0.5 0.6   DBIL  --   --   --  0.2   ALKPHOS 62 76 73 42   PROTTOTAL 6.1* 7.3 6.8 6.3*   ALBUMIN 2.4* 3.1* 3.1* 3.4   AST 16 21 20 20   ALT 11 14 19 32   *  --   --   --     < > = values in this interval not displayed.     Hematology Studies      Recent Labs   Lab Test 09/01/20  0658 08/31/20  1259 08/26/20  0938 08/21/20  0456 08/20/20  0539   WBC 3.4* 4.2 3.5* 2.3* 2.4*   ANEU 2.6 3.5 3.0  --   --    ALYM 0.5* 0.4* 0.4*  --   --    MARTHA 0.3 0.2 0.1  --   --    AEOS 0.0 0.1 0.0  --   --    HGB  7.9* 9.7* 9.0* 8.8* 9.0*   HCT 26.0* 31.3* 29.0* 28.8* 29.4*    374 315 263 262       Urine Studies     Recent Labs   Lab Test 08/31/20  1615 08/20/20  1630 08/18/20  1404 05/18/20  0135 05/12/20  2250  01/30/19  1255   URINEPH 5.5 5.5 5.5 5.0 5.0   < > 5.0   NITRITE Negative Negative Negative Negative Negative   < > Negative   LEUKEST Negative Small* Moderate* Negative Negative   < > Negative   WBCU 1 12* 28* 7* 3   < > 82*   UWBCLM  --   --   --   --   --   --  Present*    < > = values in this interval not displayed.       Medication levels    Recent Labs   Lab Test 09/02/20  0551  05/27/20  0837   VANCOMYCIN  --   --  26.6*   TACROL 6.5   < >  --     < > = values in this interval not displayed.       Microbiology:  Fungal testing  Recent Labs   Lab Test 08/31/20  1817 08/19/20  1759   FGTL 90 86   ASPGAI 0.05  --    ASPGAA Negative  --        Syphilis Testing    Treponema Antibodies   Date Value Ref Range Status   05/15/2020 Nonreactive NR^Nonreactive Final     Comment:     Methodology Change: Test performed on the [employer] XL by Treponema   pallidum Total Antibodies Assay as of 3.17.2020.         Quantiferon testing   Recent Labs   Lab Test 09/04/20  0543 09/03/20  0526   LYMPH 8.5 11.2       Virology:  Respiratory virus testing    Recent Labs   Lab Test 08/31/20  1302 08/21/20  0520 08/19/20  1900  08/18/20  1235 05/29/20  1530   IFLUA Not Detected  --  Not Detected   < >  --   --    FLUAH1 Not Detected  --  Not Detected   < >  --   --    UF0926 Not Detected  --  Not Detected   < >  --   --    FLUAH3 Not Detected  --  Not Detected   < >  --   --    IFLUB Not Detected  --  Not Detected   < >  --   --    PIV1 Not Detected  --  Not Detected  --   --   --    PIV2 Not Detected  --  Not Detected  --   --   --    PIV3 Not Detected  --  Not Detected  --   --   --    PIV4 Not Detected  --  Not Detected   < >  --   --    RSVA Not Detected  --  Not Detected   < >  --   --    RSVB Not Detected  --  Not  Detected   < >  --   --    HMPV Not Detected  --  Not Detected  --   --   --    ADENOV Not Detected  --  Not Detected   < >  --   --    CORONA Not Detected  --  Not Detected   < >  --   --    [id number] Nasopharyngeal Nasopharyngeal  --   --  Nasopharyngeal Nasopharyngeal   SARSCOVRES NEGATIVE NEGATIVE  --   --  NEGATIVE NEGATIVE    < > = values in this interval not displayed.       CMV viral loads    Recent Labs   Lab Test 09/01/20  0658 08/19/20  1801 08/13/20  0541 06/15/20  0826   CSPEC Plasma Plasma EDTA PLASMA EDTA PLASMA   CMVLOG Not Calculated Not Calculated Not Calculated Not Calculated       Log IU/mL of CMVQNT   Date Value Ref Range Status   09/01/2020 Not Calculated <2.1 [Log_IU]/mL Final   08/19/2020 Not Calculated <2.1 [Log_IU]/mL Final   08/13/2020 Not Calculated <2.1 [Log_IU]/mL Final   06/15/2020 Not Calculated <2.1 [Log_IU]/mL Final       No results found for: H6RES    EBV DNA Copies/mL   Date Value Ref Range Status   09/01/2020 EBV DNA Not Detected EBVNEG^EBV DNA Not Detected [Copies]/mL Final   08/13/2020 EBV DNA Not Detected EBVNEG^EBV DNA Not Detected [Copies]/mL Final   06/15/2020 EBV DNA Not Detected EBVNEG^EBV DNA Not Detected [Copies]/mL Final       BK Virus Result   Date Value Ref Range Status   08/19/2020 BK Virus DNA Not Detected BKNEG^BK Virus DNA Not Detected copies/mL Final     Hepatitis B Testing     Recent Labs   Lab Test 05/15/20  0559   AUSAB 419.90*   HBCAB Nonreactive   HEPBANG Nonreactive        Hepatitis C Antibody   Date Value Ref Range Status   05/15/2020 Nonreactive NR^Nonreactive Final     Comment:     Assay performance characteristics have not been established for newborns,   infants, and children     12/04/2019 Nonreactive NR^Nonreactive Final     Comment:     Assay performance characteristics have not been established for newborns,   infants, and children         CMV Antibody IgG   Date Value Ref Range Status   05/18/2020 0.3 0.0 - 0.8 AI Final     Comment:      Negative  Antibody index (AI) values reflect qualitative changes in antibody   concentration that cannot be directly associated with clinical condition or   disease state.       EBV Capsid Antibody IgG   Date Value Ref Range Status   05/18/2020 >8.0 (H) 0.0 - 0.8 AI Final     Comment:     Positive, suggests recent or past exposure  Antibody index (AI) values reflect qualitative changes in antibody   concentration that cannot be directly associated with clinical condition or   disease state.       Toxoplasma Antibody IgG   Date Value Ref Range Status   05/18/2020 <3.0 0.0 - 7.1 IU/mL Final     Comment:     Negative- Absence of detectable Toxoplasma gondii IgG antibodies. A negative   result does not rule out acute infection.  The magnitude of the measured result is not indicative of the amount of   antibody present. The concentrations of anti-Toxoplasma gondii IgG in a given   specimen determined with assays from different manufacturers can vary due to   differences in assay methods and reagent specificity.       No results found for: H1IGG, H2IGG, EBVCAM    Imaging:  Recent Results (from the past 48 hour(s))   CT Chest w/o Contrast    Narrative    EXAMINATION: CT CHEST W/O CONTRAST, 9/2/2020 4:23 PM    TECHNIQUE:  Helical CT images from the thoracic inlet through the lung  bases were obtained without IV contrast.     COMPARISON: CT chest 7/29/2020, CTA 1/30/2019, and CT 7/22/2018.     HISTORY: Cough, persistent    FINDINGS:    Chest:   Postsurgical changes of heart transplant with unremarkable appearance  of the allograft and unchanged mild soft tissue density thickening  along anterior pericardium. Intact median sternotomy wires. Great  vessels are grossly normal in caliber. Blood pool is hypoattenuating  the myocardium. Central tracheobronchial tree is patent.    New spiculated right upper lobe mass vs consolidation along the  superior right major fissure with adjacent groundglass nodularity  peripherally. There  is new right hilar mass vs lymphadenopathy with  narrowing of multiple right upper lobe subsegmental bronchi. Unchanged  smaller nodules in the anterior right upper lobe (series 6 image 100).  Remaining lungs are clear. No effusion or pneumothorax. Scattered  calcified granulomas are unchanged.    Abdomen:   Examination of the upper abdomen is limited. Extensive cholelithiasis  without CT evidence for cholecystitis.    Bones/soft tissues:   No suspicious osseous lesion or acute abnormality. Mild degenerative  changes of the thoracic spine.        Impression    IMPRESSION:     1. New mass vs consolidation in the right upper lobe and right hilum  since 7/29/2020 with narrowing of multiple right upper lobe bronchi.   1a. Differential pneumonia with reactive right hilar lymphadenopathy  vs. malignancy(ex. Lymphoma/PTLD) with post obstructive pneumonia.    2. Stable additional pulmonary lesions as above.    3. Stable postsurgical changes of heart transplant.    4. Cholelithiasis without evidence of cholecystitis.    I have personally reviewed the examination and initial interpretation  and I agree with the findings.    FAWAD STEWART MD

## 2020-09-04 NOTE — PLAN OF CARE
OT 6C  Discharge Planner OT   Patient plan for discharge: Not discussed today  Current status: IND supine to sitting EOB with HOB elevated. IND donning slippers sitting EOB. SBA STS x2 from EOB. SBA to walk to in-room sink. SBA light g/h standing at sink. Pt completed ~260ft of functional mobility with CGA  Barriers to return to prior living situation: acute medical needs, decreased activity tolerance  Recommendations for discharge: Home with assist and continued OP CR  Rationale for recommendations: Pt is below baseline and would benefit from continued skilled therapy to increase activity tolerance and IND with ADLs.          Entered by: Madelyn Miranda 09/04/2020 3:03 PM

## 2020-09-04 NOTE — PROGRESS NOTES
[facility]  Transplant Infectious Disease Consult Note - New Patient     Patient:  Mariusz Sharp, Date of birth 1960, Medical record number [id number]  Date of Visit:  09/04/2020  Consult requested by Dr. Nasreen Nielson for evaluation of fevers         Assessment and Recommendations:   Recommendations:  --Continuing cefdinir   --Start PO Posaconazole 300 mg twice daily on day 1; Maintenance: 300 mg once daily pending infectious work up   --Please check posaconazole trough level on day 5, goal is >1.0   --Would need adjustment of tacrolimus dose (~1/3rd dose reduction) due to drug-drug interaction    --Planned EBUS on Tuesday--   --Send BAL for immunocompromised studies   --Please send tissue for histopathology, AFB, bacterial and fungal stains    --Please send tissue for bacterial, AFB and fungal cultures   --If able to get additional tissue, please send fresh sample for fungal DNA 28S rRNA    --Pending non-invasive fungal work up, urine and serum histoplasma Ag, urine and serum blastomyces Ag, fungal antibody panel, serum cryptococcal Ag.  --PCP prophylaxis-dc'd as off prednisone. prior Inhaled pentamidine-last dose 7/29/2020. Bactrim was discontinued 5/27/2020 2/2 hyperkalemia,   --Viral serostatus & prophylaxis: Valcyte    Thank you very much for this consultation. Transplant Infectious Disease will continue to follow with you.    Lincoln Hightower MD    Transplant Infectious Disease  Pager [phone]    Problem List/Assessment:    1. RUL and right hilum mass vs consolidation-c/f infection vs malignancy.   2. Low positive fungitell at 90, prior 85 on   3. Mild diarrhea  4. Prostatitis - on cefdinir x 6 weeks thru 10/1/20.  5. ICM s/p LVAD  And subsequent OHT 5/18/20   -CMV D+/R-, EBV D-/R+, HSV1?/2?, VZV +, Toxo D-/R-   -Maintenance immunosuppression-cellcept, tacrolimus, pred  6. Rhinovirus shedding   7. Donor Blood Cx w/ S anginosus and Veillonella sp in 1/2 sets on  20 (suspected contaminant)-s/p 7 days antibiotics  8. CKD  9. DMII    Patient presented with fevers of 101.5 at home (none while inpatient), worsening persistent cough associated with syncope and progressive dyspnea and fatigue, although remains on room air. CXR was clear but due to symptoms, we ordered a CT chest which shows RUL and right hilum mass vs consolidations with narrowing of multiple RUL bronchi--this is c/f either an infectious process vs malignancy including PTLD. He has had a low positive fungitell on  and --unclear significance. However, given his symptoms and CT chest findings, is somewhat c/f for atypical infections, delicia fungal (as well as AFB and other atypical bacteria). As a result, I would start anti-mold coverage pending work up as outlined above. Malignancy/ PTLD also possible but EBV PCR in serum is negative. Pulm has been consulted and plan EBUS +biopsy on Tuesday.    Other infectious work up negative thus far including COVID-19 testing, serum aspergillus Ag, serum CMV and EBV PCRs, Urine strep and legionella and blood cx. Unable able to collect adequate sputum sample for bacterial Cx, but PCP PCR pending. However, would expect fungitell to be significantly higher for PCP and low positive fungitell makes it less likely            Interval History:   --Plan for EBUS on Tuesday.  --Otherwise no acute events. Afebrile, HDS    Transplants:  2020 (Heart), Postoperative day:  109.  Coordinator Angelika Muller    Microbiology:  20 Sputum PCP PCR pending  20 Urine legionella Ag: negative  20 Serum CMV PCR negative  20 Serum EBV PCR negative  20 Serum cryptococcal Ag negative  20 Urine strep Ag negative  20 Serum Fungitell: 90  20 Serum Aspergillus A.05  20 COVID-19 negative  20 Resp Viral panel negative  20 Blood Cx: NGTD    Prior Micro:  20 Urine Cx: NG  20 Sputum Cx: Normal Mary  20 Urine Cx: NG  20  Serum Fungitell 86  8/19/20 Resp viral panel + Human Rhinovirus/Enterovirus  8/18/20 Blood Cx: NG  5/21/20 Blood Cx: NG      Review of Systems: 10 point ROS unremarkable unless stated otherwise in HPI.         Current Medications & Allergies:       aspirin  81 mg Oral Daily     calcium carbonate 600 mg-vitamin D 400 units  1 tablet Oral BID w/meals     cefdinir  300 mg Oral Q12H CHANCE     heparin ANTICOAGULANT  5,000 Units Subcutaneous Q12H     hydrALAZINE  12.5 mg Oral TID     insulin aspart  1-7 Units Subcutaneous TID AC     insulin aspart  1-5 Units Subcutaneous At Bedtime     insulin aspart   Subcutaneous QAM AC     insulin aspart   Subcutaneous Daily with lunch     insulin aspart   Subcutaneous Daily with supper     insulin glargine  24 Units Subcutaneous At Bedtime     mycophenolate  1,000 mg Oral BID IS     nystatin  500,000 Units Oral 4x Daily     posaconazole  300 mg Oral BID w/meals     [START ON 9/5/2020] posaconazole  300 mg Oral Daily     rosuvastatin  10 mg Oral At Bedtime     sodium chloride (PF)  3 mL Intracatheter Q8H     tacrolimus  3 mg Oral QAM     tacrolimus  4 mg Oral QPM     valGANciclovir  450 mg Oral Daily       No Known Allergies         Physical Exam:     Ranges for vital signs:  Temp:  [98  F (36.7  C)-98.7  F (37.1  C)] 98.4  F (36.9  C)  Pulse:  [107-122] 107  Resp:  [16-18] 18  BP: (103-142)/(74-98) 103/75  SpO2:  [89 %-99 %] 98 %  Vitals:    08/31/20 2258 09/02/20 0657 09/04/20 0548   Weight: 79.9 kg (176 lb 3.2 oz) 80.1 kg (176 lb 9.6 oz) 79.2 kg (174 lb 8 oz)       Physical Examination:  GENERAL:  well-developed, well-nourished, in bed in no acute distress.  ENT:  Oropharynx is moist without exudates or ulcers.   NECK:  Supple. No cervical lymphadenopathy  LUNGS:  Clear to auscultation bilateral. Normal work of breathing  CARDIOVASCULAR:  Regular rate and rhythm with no murmurs, gallops or rubs.  ABDOMEN:  Normal bowel sounds, soft, nontender. No appreciable hepatosplenomegaly.  SKIN:   No acute rashes.  Line in place without any surrounding erythema or exudate.  NEUROLOGIC:  Grossly nonfocal. Active x4 extremities         Laboratory Data:       Inflammatory Markers    Recent Labs   Lab Test 08/19/20  1759  07/23/18  0645   CRP  --   --  11.0*   PSA 10.80*   < >  --     < > = values in this interval not displayed.     Metabolic Studies       Recent Labs   Lab Test 09/01/20  0658 08/31/20  1259 08/26/20  0939 08/19/20  0558 06/15/20  0826    130* 137 139 139   POTASSIUM 3.8 4.0 4.1 3.6 4.8   CHLORIDE 104 100 105 107 110*   CO2 22 22 25 25 21   ANIONGAP 9 8 7 6 8   BUN 21 33* 41* 27 98*   CR 1.38* 1.60* 1.91* 1.31* 1.89*   GFRESTIMATED 56* 47* 38* 60* 38*   * 186* 164* 69* 120*   A1C  --   --   --  7.4* 7.8*   ARLENE 8.5 9.6 9.3 8.4* 9.0   PHOS  --   --  3.0  --  4.4   MAG 1.4*  --  1.7  --  1.7   LACT 0.9 1.5  --   --   --    PCAL  --  0.23  --   --   --    FGTL  --   --   --   --   --    CKT  --   --   --   --  132    < > = values in this interval not displayed.       Hepatic Studies    Recent Labs   Lab Test 09/01/20  0658 08/31/20  1259 08/26/20  0939 07/29/20  0902   BILITOTAL 0.9 0.8 0.5 0.6   DBIL  --   --   --  0.2   ALKPHOS 62 76 73 42   PROTTOTAL 6.1* 7.3 6.8 6.3*   ALBUMIN 2.4* 3.1* 3.1* 3.4   AST 16 21 20 20   ALT 11 14 19 32   *  --   --   --     < > = values in this interval not displayed.     Hematology Studies      Recent Labs   Lab Test 09/01/20  0658 08/31/20  1259 08/26/20  0938 08/21/20  0456 08/20/20  0539   WBC 3.4* 4.2 3.5* 2.3* 2.4*   ANEU 2.6 3.5 3.0  --   --    ALYM 0.5* 0.4* 0.4*  --   --    MARTHA 0.3 0.2 0.1  --   --    AEOS 0.0 0.1 0.0  --   --    HGB 7.9* 9.7* 9.0* 8.8* 9.0*   HCT 26.0* 31.3* 29.0* 28.8* 29.4*    374 315 263 262       Urine Studies     Recent Labs   Lab Test 08/31/20  1615 08/20/20  1630 08/18/20  1404 05/18/20  0135 05/12/20  2250  01/30/19  1255   URINEPH 5.5 5.5 5.5 5.0 5.0   < > 5.0   NITRITE Negative Negative Negative  Negative Negative   < > Negative   LEUKEST Negative Small* Moderate* Negative Negative   < > Negative   WBCU 1 12* 28* 7* 3   < > 82*   UWBCLM  --   --   --   --   --   --  Present*    < > = values in this interval not displayed.       Medication levels    Recent Labs   Lab Test 09/02/20  0551  05/27/20  0837   VANCOMYCIN  --   --  26.6*   TACROL 6.5   < >  --     < > = values in this interval not displayed.       Microbiology:  Fungal testing  Recent Labs   Lab Test 08/31/20  1817 08/19/20  1759   FGTL 90 86   ASPGAI 0.05  --    ASPGAA Negative  --        Syphilis Testing    Treponema Antibodies   Date Value Ref Range Status   05/15/2020 Nonreactive NR^Nonreactive Final     Comment:     Methodology Change: Test performed on the [employer] Liaison XL by Treponema   pallidum Total Antibodies Assay as of 3.17.2020.         Quantiferon testing   Recent Labs   Lab Test 09/04/20  0543 09/03/20  0526   LYMPH 8.5 11.2       Virology:  Respiratory virus testing    Recent Labs   Lab Test 08/31/20  1302 08/21/20  0520 08/19/20  1900  08/18/20  1235 05/29/20  1530   IFLUA Not Detected  --  Not Detected   < >  --   --    FLUAH1 Not Detected  --  Not Detected   < >  --   --    IB2453 Not Detected  --  Not Detected   < >  --   --    FLUAH3 Not Detected  --  Not Detected   < >  --   --    IFLUB Not Detected  --  Not Detected   < >  --   --    PIV1 Not Detected  --  Not Detected  --   --   --    PIV2 Not Detected  --  Not Detected  --   --   --    PIV3 Not Detected  --  Not Detected  --   --   --    PIV4 Not Detected  --  Not Detected   < >  --   --    RSVA Not Detected  --  Not Detected   < >  --   --    RSVB Not Detected  --  Not Detected   < >  --   --    HMPV Not Detected  --  Not Detected  --   --   --    ADENOV Not Detected  --  Not Detected   < >  --   --    CORONA Not Detected  --  Not Detected   < >  --   --    [id number] Nasopharyngeal Nasopharyngeal  --   --  Nasopharyngeal Nasopharyngeal   SARSCOVRES NEGATIVE NEGATIVE   --   --  NEGATIVE NEGATIVE    < > = values in this interval not displayed.       CMV viral loads    Recent Labs   Lab Test 09/01/20  0658 08/19/20  1801 08/13/20  0541 06/15/20  0826   CSPEC Plasma Plasma EDTA PLASMA EDTA PLASMA   CMVLOG Not Calculated Not Calculated Not Calculated Not Calculated       Log IU/mL of CMVQNT   Date Value Ref Range Status   09/01/2020 Not Calculated <2.1 [Log_IU]/mL Final   08/19/2020 Not Calculated <2.1 [Log_IU]/mL Final   08/13/2020 Not Calculated <2.1 [Log_IU]/mL Final   06/15/2020 Not Calculated <2.1 [Log_IU]/mL Final       No results found for: H6RES    EBV DNA Copies/mL   Date Value Ref Range Status   09/01/2020 EBV DNA Not Detected EBVNEG^EBV DNA Not Detected [Copies]/mL Final   08/13/2020 EBV DNA Not Detected EBVNEG^EBV DNA Not Detected [Copies]/mL Final   06/15/2020 EBV DNA Not Detected EBVNEG^EBV DNA Not Detected [Copies]/mL Final       BK Virus Result   Date Value Ref Range Status   08/19/2020 BK Virus DNA Not Detected BKNEG^BK Virus DNA Not Detected copies/mL Final     Hepatitis B Testing     Recent Labs   Lab Test 05/15/20  0559   AUSAB 419.90*   HBCAB Nonreactive   HEPBANG Nonreactive        Hepatitis C Antibody   Date Value Ref Range Status   05/15/2020 Nonreactive NR^Nonreactive Final     Comment:     Assay performance characteristics have not been established for newborns,   infants, and children     12/04/2019 Nonreactive NR^Nonreactive Final     Comment:     Assay performance characteristics have not been established for newborns,   infants, and children         CMV Antibody IgG   Date Value Ref Range Status   05/18/2020 0.3 0.0 - 0.8 AI Final     Comment:     Negative  Antibody index (AI) values reflect qualitative changes in antibody   concentration that cannot be directly associated with clinical condition or   disease state.       EBV Capsid Antibody IgG   Date Value Ref Range Status   05/18/2020 >8.0 (H) 0.0 - 0.8 AI Final     Comment:     Positive, suggests  recent or past exposure  Antibody index (AI) values reflect qualitative changes in antibody   concentration that cannot be directly associated with clinical condition or   disease state.       Toxoplasma Antibody IgG   Date Value Ref Range Status   05/18/2020 <3.0 0.0 - 7.1 IU/mL Final     Comment:     Negative- Absence of detectable Toxoplasma gondii IgG antibodies. A negative   result does not rule out acute infection.  The magnitude of the measured result is not indicative of the amount of   antibody present. The concentrations of anti-Toxoplasma gondii IgG in a given   specimen determined with assays from different manufacturers can vary due to   differences in assay methods and reagent specificity.       No results found for: H1IGG, H2IGG, EBVCAM    Imaging:  Recent Results (from the past 48 hour(s))   CT Chest w/o Contrast    Narrative    EXAMINATION: CT CHEST W/O CONTRAST, 9/2/2020 4:23 PM    TECHNIQUE:  Helical CT images from the thoracic inlet through the lung  bases were obtained without IV contrast.     COMPARISON: CT chest 7/29/2020, CTA 1/30/2019, and CT 7/22/2018.     HISTORY: Cough, persistent    FINDINGS:    Chest:   Postsurgical changes of heart transplant with unremarkable appearance  of the allograft and unchanged mild soft tissue density thickening  along anterior pericardium. Intact median sternotomy wires. Great  vessels are grossly normal in caliber. Blood pool is hypoattenuating  the myocardium. Central tracheobronchial tree is patent.    New spiculated right upper lobe mass vs consolidation along the  superior right major fissure with adjacent groundglass nodularity  peripherally. There is new right hilar mass vs lymphadenopathy with  narrowing of multiple right upper lobe subsegmental bronchi. Unchanged  smaller nodules in the anterior right upper lobe (series 6 image 100).  Remaining lungs are clear. No effusion or pneumothorax. Scattered  calcified granulomas are unchanged.    Abdomen:    Examination of the upper abdomen is limited. Extensive cholelithiasis  without CT evidence for cholecystitis.    Bones/soft tissues:   No suspicious osseous lesion or acute abnormality. Mild degenerative  changes of the thoracic spine.        Impression    IMPRESSION:     1. New mass vs consolidation in the right upper lobe and right hilum  since 7/29/2020 with narrowing of multiple right upper lobe bronchi.   1a. Differential pneumonia with reactive right hilar lymphadenopathy  vs. malignancy(ex. Lymphoma/PTLD) with post obstructive pneumonia.    2. Stable additional pulmonary lesions as above.    3. Stable postsurgical changes of heart transplant.    4. Cholelithiasis without evidence of cholecystitis.    I have personally reviewed the examination and initial interpretation  and I agree with the findings.    FAWAD STEWART MD

## 2020-09-04 NOTE — PLAN OF CARE
Pt 8/31 with progressive cough, SOB, fever. Pt A/O. VSS, BPs intermittently soft. ST. 0-1L O2 - pt had some SOB at rest this morning, NP aware. PRN Neb given with symptom improving, although still notable per pt. Pt c/o SOB getting worse again this evening, PRN neb given again. Pt placed on 1L O2. Cards 2 MD called and notified of this SOB this evening, no further orders. Pt mentioned to writer this evening that pt had some CP earlier, MD updated of this, pt educated to call nsg when this happens. PRN Tylenol given x1 for sternal pain + HA. Frequent coughing, declined lozenges. BG assessed and managed per protocol. K+ (3.9) and Mg (1.7) replaced per protocols. Started on Noxafil BID. Worked with therapy. Up ad domingo in room. Pleasant, makes needs known. Continue with plan of care and report changes to treatment team. Continue to monitor respiratory status.

## 2020-09-04 NOTE — PLAN OF CARE
OT/CR-6C: Cancel. Pt declined therapy today despite encouragement stating that he did not feel well. Will reschedule.

## 2020-09-04 NOTE — PLAN OF CARE
D: Admitted 8/31 with SOB, cough, fever    PMHx CAD, LV thrombus, CKD Stage III, PAF, DM Type II, and ICM s/p HM III LVAD as BTT with subsequent OHT 5/18/20     I: Monitored vitals and assessed pt status.   PRN: lozenge  Tele: ST  O2: RA  Mobility: independent     A: A0x4. VSS. BP elevated at times. Afebrile. Urinating adequately. BMx2. Dry cough. Good appetite for dinner. Denies pain. Able to make needs known.     P: Continue to monitor Pt status and report changes to Cards 2. Pending IP plan with mass sampling. Pt voiced concern not being on home dose of amlodipine, crosscover notified, f/u with primary team.

## 2020-09-04 NOTE — PROGRESS NOTES
[facility]   Cardiology II Service / Advanced Heart Failure  Daily Progress Note  Date of Service: 9/4/2020      Patient: Mariusz Sharp  MRN: [id number]  Admission Date: 8/31/2020  Hospital Day # 4    Assessment and Plan: Mariusz Sharp is a 59 year old male with PMH of CAD, LV thrombus, CKD Stage III, PAF, DM Type II, and ICM s/p HM III LVAD as BTT with subsequent OHT 5/18/20. He presents with progressive SOB, cough, and fever.      Plan today:  - Appreciate IR Pulmonary consult, plan for EBUS Tuesday.   - Repeat Tac level pending.   - Immuknow pending.   - Posaconazole initiated 300 mg po BID.      Fever with Cough secondary to RUL mass. Tmax 102 prior to admission. History of rhinovirus. CDIF, LA, UA, COVID, Legionella, Cryptococcus, Strep pneumo negative, Aspergillus, CMV, and EBV negative. Renal US notes resolution of prior stone. RVP and Chest X-ray negative. History of PAcnes to outflow graft and Donor positive S Anginosus and Veillonella (completed course of antibiotics). Concern for Chronic Prostatitis. Vanco/Zosyn discontinued 9/1. CT Chest positive for RUL mass concerning for PTLD vs infection. Note symptoms worse following cessation of antibiotics.   - Appreciate Transplant ID Consult.   - Beta D glucan 90 and trending upward.   - Blood cultures NTD.   - Aggressive pulmonary toileting and nebs.   - Appreciate IR Pulmonary Consult, plan for EBUS Tuesday.   - Continue Cefdinir 300 mg po BID through 10/1/20.  - Initiate Posaconazole 300 mg po BID, 300 mg po daily in AM. Repeat Posaconazole in 5 days (ordered).       Syncope, resolved. Occurred with cough prior to admission.   - Encourage fluids.   - Aggressive pulmonary toileting and nebs.      Diarrhea, improving.   - CDIF negative and CMV negative.      S/p OHT secondary to ICM. 5/18/20. Echo 8/31 with EF 70% and normal graft function.   RHC 8/26 with mRA-3, RV-20/2, mPA-15, mPCWP-10, AMRIT CO-4.91, AMRIT CI-2.63, biopsy negative.  "  Immunosuppression: Simulect 5/18 and 5/22. Tac decreased to 2 mg po BID in setting of posaconazole initiation. Tac level pending. Goal-8-10. Cellcept 1000 mg po BID, and Prednisone completed 8/27/20.   Serostatus: CMV: D+, R-, EBV: D- R+, Toxo D- R-  Prophylaxis: Valcyte. Bactrim discontinued due to hyperkalemia. Pentamidine neb last administered 7/29, no further indication given he is off steroids.    - Continue ASA and Crestor.   - Immuknow pending.   - Next EMB due 9/26.      HTN. Prior to admission Norvasc on  Hold.   - Hydralazine 12.5 mg po TID. (home dose 25 mg TID).   - Transient BP, given lows defer dose change.      DM Type II.   - Lantus 24 units at HS.   - Novolog preprandial with medium intensity sliding scale insulin.      Elevated PSA with Concern for Chronic Prostatitis. PSA-8.21. MRI consistent with BPH.   - Continue Cefdinir 300 mg po BID through 10/1/20.  ================================================================  Interval History/ROS: He complains of persistent nonproductive cough. He notes occasional dizziness, but no recurrent syncope with his cough. He notes hot flushes overnight. He denies fever, chest pain, palpitations, nausea, vomiting, diarrhea, and LE edema. He is tolerating intake, but limited amounts. He is ambulating in his room.     Last 24 hr care team notes reviewed.   ROS:  4 point ROS including Respiratory, CV, GI and , other than that noted in the HPI, is negative.     Medications: Reviewed in EPIC.     Physical Exam:   /75 (BP Location: Left arm)   Pulse 107   Temp 98.4  F (36.9  C) (Oral)   Resp 18   Ht 1.626 m (5' 4\")   Wt 79.2 kg (174 lb 8 oz)   SpO2 98%   BMI 29.95 kg/m    GENERAL: Appears alert and oriented times three.   HEENT: Eye symmetrical and free of discharge bilaterally. Mucous membranes moist and without lesions.  NECK: Supple and without lymphadenopathy. JVD below clavicular line upright.   CV: RRR, S1S2 present without murmur, rub, or " gallop.   RESPIRATORY: Respirations regular, even, and unlabored. Lungs CTA throughout.   GI: Soft and non distended with normoactive bowel sounds present in all quadrants. No tenderness, rebound, guarding. No organomegaly.   EXTREMITIES: No peripheral edema. 2+ bilateral pedal pulses.   NEUROLOGIC: Alert and orientated x 3. CN II-XII grossly intact. No focal deficits.   MUSCULOSKELETAL: No joint swelling or tenderness.   SKIN: No jaundice. No rashes or lesions.     Data:  CMP  Recent Labs   Lab 09/04/20  0543 09/03/20  0526 09/02/20  0551 09/01/20  0658    136 137 134   POTASSIUM 3.9 4.2 4.1 3.8   CHLORIDE 104 106 107 104   CO2 22 23 22 22   ANIONGAP 8 7 7 9   * 191* 126* 118*   BUN 17 23 24 21   CR 1.16 1.13 1.32* 1.38*   GFRESTIMATED 69 71 59* 56*   GFRESTBLACK 80 83 69 65   ARLENE 9.0 8.6 8.5 8.5   MAG 1.7 1.6 1.9 1.4*   PROTTOTAL 6.5* 6.4* 6.0* 6.1*   ALBUMIN 2.6* 2.5* 2.3* 2.4*   BILITOTAL 0.8 0.4 0.5 0.9   ALKPHOS 66 66 58 62   AST 21 19 19 16   ALT 12 16 14 11     CBC  Recent Labs   Lab 09/04/20  0543 09/03/20  0526 09/02/20  0551 09/01/20  0658   WBC 3.8* 3.1* 2.8* 3.4*   RBC 3.15* 3.03* 2.95* 2.91*   HGB 8.7* 8.3* 8.1* 7.9*   HCT 27.5* 27.0* 26.7* 26.0*   MCV 87 89 91 89   MCH 27.6 27.4 27.5 27.1   MCHC 31.6 30.7* 30.3* 30.4*   RDW 14.2 14.1 14.3 14.5    333 354 336     Patient discussed with Dr. Ortiz.      Antonia Byrne FNP  9/4/2020

## 2020-09-04 NOTE — CONSULTS
Interventional Pulmonology          Initial Inpatient Consultation Note                                     September 4, 2020            Patient: Mariusz Sharp    Date of Admission: 8/31/2020  Reason for Consultation: New RUL mass  Requesting Physician: No referring provider defined for this encounter.      Assessment:   Mariusz Sharp is a 59 year old with history of ICM s/p heart transplant 5/18/20, pAF, and DMII admitted on 8/31/2020 with syncope, coughing, and fever. Has been feeling short of breath and fatigued for 4 weeks.    CT Chest shows new RUL consolidation that is new from CT Chest performed on 7/29/2020. Differential includes infection vs malignancy without diagnosis established yet from serum testing.      Plan:    Plan for EBUS-TBNA in endoscopy on Tuesday 9/8/20 around 10AM, this can be done as outpatient or inpatient. Keep NPO at midnight prior to procedure.    If patient discharges over the weekend, please obtain repeat COVID swab sent STAT for procedural purposes on day of discharge. If remains inpatient, his admission COVID will be valid for the procedure.     If still inpatient, please hold subcutaneous heparin the morning of the procedure    Patient seen and discussed with Dr. Lemus. We will not actively follow patient over the weekend, but please page the general pulmonary fellow with any questions.     Sasha Nolan  Interventional Pulmonary Fellow  [phone]             Chief Complaint: New RUL Mass    History of Present Illness:   Mariusz Sharp is a 59 year old with history of ICM s/p heart transplant 5/18/20, pAF, and DMII admitted on 8/31/2020 with syncope, coughing, and fever.    Patient had prolonged cough, felt weak, and fell on his forehead after passing out. He had fever of 101.3 at home. Over the past several weeks has been feeling more shortness of breath. Can only walk about 50 feet before getting winded, which is different than post transplant. CT Chest was obtained that  showed RUL consolidation with right hilar LAD. Attempts at two sputum samples showed squamous epithelial cells.          Data:   All pertinent laboratory and imaging data reviewed.      WBC 3.8, Hgb 8.7, Plt 376  Cr 1.16    CT Chest 8/31/20  New RUL mass with right hilar lymphadenopathy         Past Medical History:     Past Medical History:   Diagnosis Date     Acute kidney injury (H)      CKD (chronic kidney disease)      Coronary artery disease      Diabetes mellitus (H)      HTN (hypertension)      Hyperlipidemia      ICD (implantable cardioverter-defibrillator), single chamber- NOT dependent 7/17/2018     Ischemic cardiomyopathy      LV (left ventricular) mural thrombus      Paroxysmal atrial flutter (H)      RVF (right ventricular failure) (H)      Systolic heart failure (H)      Ventricular tachycardia (H)             Past Surgical History:     Past Surgical History:   Procedure Laterality Date     COLONOSCOPY N/A 12/7/2018    Procedure: COLONOSCOPY;  Surgeon: Krish Mercado MD;  Location: [facility]     CV HEART BIOPSY N/A 5/24/2020    Procedure: Heart Biopsy;  Surgeon: Kit Bacon MD;  Location: [facility]     CV HEART BIOPSY N/A 6/1/2020    Procedure: CV HEART BIOPSY;  Surgeon: Kit Bacon MD;  Location: [facility]     CV HEART BIOPSY N/A 6/8/2020    Procedure: CV HEART BIOPSY;  Surgeon: Kit Bacon MD;  Location: [facility]     CV HEART BIOPSY N/A 6/15/2020    Procedure: CV HEART BIOPSY;  Surgeon: Kit Bacon MD;  Location: [facility]     CV HEART BIOPSY N/A 6/30/2020    Procedure: CV HEART BIOPSY;  Surgeon: Kit Bacon MD;  [facility]     CV HEART BIOPSY N/A 7/14/2020    Procedure: CV HEART BIOPSY;  Surgeon: Brian Long MD;  [facility]     CV HEART BIOPSY N/A 7/29/2020    Procedure: CV HEART BIOPSY;   Surgeon: Momo Hunt MD;  [facility]     CV HEART BIOPSY N/A 8/13/2020    Procedure: CV HEART BIOPSY;  Surgeon: Khris Mcclelland MD;  [facility]     CV HEART BIOPSY N/A 8/26/2020    Procedure: CV HEART BIOPSY;  Surgeon: Momo Hunt MD;  Location:  HEART CARDIAC CATH LAB     CV RIGHT HEART CATH N/A 2/19/2019    Procedure: RHC;  Surgeon: Brian Long MD;  [facility]     CV RIGHT HEART CATH N/A 7/5/2019    Procedure: CV RIGHT HEART CATH;  Surgeon: Khris Mcclelland MD;  Location:  HEART CARDIAC CATH LAB     CV RIGHT HEART CATH N/A 5/24/2020    Procedure: Right Heart Cath;  Surgeon: Kit Bacon MD;  Location:  HEART CARDIAC CATH LAB     CV RIGHT HEART CATH N/A 6/1/2020    Procedure: CV RIGHT HEART CATH;  Surgeon: Kit Bacon MD;  Location:  HEART CARDIAC CATH LAB     CV RIGHT HEART CATH N/A 6/8/2020    Procedure: CV RIGHT HEART CATH;  Surgeon: Kit Bacon MD;  Location:  HEART CARDIAC CATH LAB     CV RIGHT HEART CATH N/A 6/15/2020    Procedure: CV RIGHT HEART CATH;  Surgeon: Kit Bacon MD;  Location:  HEART CARDIAC CATH LAB     CV RIGHT HEART CATH N/A 6/30/2020    Procedure: CV RIGHT HEART CATH;  Surgeon: Kit Bacon MD;  Location:  HEART CARDIAC CATH LAB     CV RIGHT HEART CATH N/A 7/14/2020    Procedure: CV RIGHT HEART CATH;  Surgeon: Brian Long MD;  Location:  HEART CARDIAC CATH LAB     CV RIGHT HEART CATH N/A 7/29/2020    Procedure: CV RIGHT HEART CATH;  Surgeon: Momo Hunt MD;  Location:  HEART CARDIAC CATH LAB     CV RIGHT HEART CATH N/A 8/13/2020    Procedure: CV RIGHT HEART CATH;  Surgeon: Khris Mcclelland MD;  Location:  HEART CARDIAC CATH LAB     CV RIGHT HEART CATH N/A 8/26/2020    Procedure: CV RIGHT HEART CATH;  Surgeon: Momo Hunt MD;  Location:   HEART CARDIAC CATH LAB     HC PRQ TRLUML CORONARY ANGIOPLASTY ADDL BRANCH      PCI and ALYSSA to ostial LAD on 6/27/18     IMPLANT AUTOMATIC IMPLANTABLE CARDIOVERTER DEFIBRILLATOR       INSERT VENTRICULAR ASSIST DEVICE LEFT (HEARTMATE II) N/A 12/31/2018    Procedure: Median Sternotomy, On Cardiopulmonary Bypass Pump, Insertion of Left Ventricular Assist Device (Heartmate III);  Surgeon: Kamaljit Baker MD;  Location: [facility] OR     PICC DOUBLE LUMEN PLACEMENT Right 05/22/2020    5FR DL PICC inserted at right basilic vein, length 38cm.     TRANSPLANT HEART RECIPIENT AFTER HEART MATE N/A 5/18/2020    Procedure: REDO MEDIAN STERNOTOMY, LYSIS OF ADHESIONS, ON CARDIOPULMONARY BYPASS PUMP, HEART TRANSPLANT RECIPIENT, REMOVAL OF LEFT VENTRICULAR ASSIST DEVICE, REMOVAL OF AUTOMATED IMPLANTABLE CARDIOVERTER DEFIBRILLATOR;  Surgeon: Elder Willis MD;  Location: [facility] OR            Social History:     Social History     Socioeconomic History     Marital status: Single     Spouse name: Not on file     Number of children: Not on file     Years of education: Not on file     Highest education level: Not on file   Occupational History     Not on file   Social Needs     Financial resource strain: Not on file     Food insecurity     Worry: Not on file     Inability: Not on file     Transportation needs     Medical: Not on file     Non-medical: Not on file   Tobacco Use     Smoking status: Never Smoker     Smokeless tobacco: Never Used   Substance and Sexual Activity     Alcohol use: No     Frequency: Never     Drug use: No     Sexual activity: Not on file   Lifestyle     Physical activity     Days per week: Not on file     Minutes per session: Not on file     Stress: Not on file   Relationships     Social connections     Talks on phone: Not on file     Gets together: Not on file     Attends Restoration service: Not on file     Active member of club or organization: Not on file     Attends meetings of clubs or organizations: Not on file      Relationship status: Not on file     Intimate partner violence     Fear of current or ex partner: Not on file     Emotionally abused: Not on file     Physically abused: Not on file     Forced sexual activity: Not on file   Other Topics Concern     Parent/sibling w/ CABG, MI or angioplasty before 65F 55M? No   Social History Narrative     Not on file            Family History:   Father with macular degeneration         Allergies:   No Known Allergies         Medications:       aspirin  81 mg Oral Daily     calcium carbonate 600 mg-vitamin D 400 units  1 tablet Oral BID w/meals     cefdinir  300 mg Oral Q12H CHANCE     heparin ANTICOAGULANT  5,000 Units Subcutaneous Q12H     hydrALAZINE  12.5 mg Oral TID     insulin aspart  1-7 Units Subcutaneous TID AC     insulin aspart  1-5 Units Subcutaneous At Bedtime     insulin aspart   Subcutaneous QAM AC     insulin aspart   Subcutaneous Daily with lunch     insulin aspart   Subcutaneous Daily with supper     insulin glargine  24 Units Subcutaneous At Bedtime     mycophenolate  1,000 mg Oral BID IS     nystatin  500,000 Units Oral 4x Daily     pantoprazole  40 mg Oral QAM AC     rosuvastatin  10 mg Oral At Bedtime     sodium chloride (PF)  3 mL Intracatheter Q8H     tacrolimus  3 mg Oral QAM     tacrolimus  4 mg Oral QPM     valGANciclovir  450 mg Oral Daily            Review of Systems:   A 12 point review of systems is negative aside for as noted above         Physical Exam:   Temp:  [98  F (36.7  C)-98.7  F (37.1  C)] 98.7  F (37.1  C)  Pulse:  [108-122] 119  Resp:  [16-18] 18  BP: (105-142)/(74-98) 105/74  SpO2:  [89 %-98 %] 98 %  General: Awake, alert and in no apparent distress   EENT: No scleral icterus, no supraclavicular LAD  Neck: Trachea midline  Lungs: Breathing comfortably on RA  Abdomen: Soft,  non-distended   MSK: No edema, no clubbing   Neurologic: Answering questions appropriately  Skin: No obvious rash  Psych: Normal affect

## 2020-09-05 ENCOUNTER — APPOINTMENT (OUTPATIENT)
Dept: OCCUPATIONAL THERAPY | Facility: CLINIC | Age: 60
End: 2020-09-05
Payer: COMMERCIAL

## 2020-09-05 LAB
ALBUMIN SERPL-MCNC: 2.5 G/DL (ref 3.4–5)
ALP SERPL-CCNC: 68 U/L (ref 40–150)
ALT SERPL W P-5'-P-CCNC: 15 U/L (ref 0–70)
ANION GAP SERPL CALCULATED.3IONS-SCNC: 6 MMOL/L (ref 3–14)
ANION GAP SERPL CALCULATED.3IONS-SCNC: 8 MMOL/L (ref 3–14)
AST SERPL W P-5'-P-CCNC: 22 U/L (ref 0–45)
BASOPHILS # BLD AUTO: 0.1 10E9/L (ref 0–0.2)
BASOPHILS NFR BLD AUTO: 1.7 %
BILIRUB SERPL-MCNC: 0.8 MG/DL (ref 0.2–1.3)
BUN SERPL-MCNC: 21 MG/DL (ref 7–30)
BUN SERPL-MCNC: 22 MG/DL (ref 7–30)
CALCIUM SERPL-MCNC: 8.6 MG/DL (ref 8.5–10.1)
CALCIUM SERPL-MCNC: 9 MG/DL (ref 8.5–10.1)
CHLORIDE SERPL-SCNC: 99 MMOL/L (ref 94–109)
CHLORIDE SERPL-SCNC: 99 MMOL/L (ref 94–109)
CO2 SERPL-SCNC: 20 MMOL/L (ref 20–32)
CO2 SERPL-SCNC: 22 MMOL/L (ref 20–32)
CREAT SERPL-MCNC: 1.38 MG/DL (ref 0.66–1.25)
CREAT SERPL-MCNC: 1.41 MG/DL (ref 0.66–1.25)
DIFFERENTIAL METHOD BLD: ABNORMAL
EOSINOPHIL # BLD AUTO: 0 10E9/L (ref 0–0.7)
EOSINOPHIL NFR BLD AUTO: 0 %
ERYTHROCYTE [DISTWIDTH] IN BLOOD BY AUTOMATED COUNT: 13.9 % (ref 10–15)
GFR SERPL CREATININE-BSD FRML MDRD: 55 ML/MIN/{1.73_M2}
GFR SERPL CREATININE-BSD FRML MDRD: 56 ML/MIN/{1.73_M2}
GLUCOSE BLDC GLUCOMTR-MCNC: 163 MG/DL (ref 70–99)
GLUCOSE BLDC GLUCOMTR-MCNC: 173 MG/DL (ref 70–99)
GLUCOSE BLDC GLUCOMTR-MCNC: 178 MG/DL (ref 70–99)
GLUCOSE BLDC GLUCOMTR-MCNC: 197 MG/DL (ref 70–99)
GLUCOSE BLDC GLUCOMTR-MCNC: 248 MG/DL (ref 70–99)
GLUCOSE BLDC GLUCOMTR-MCNC: 275 MG/DL (ref 70–99)
GLUCOSE SERPL-MCNC: 186 MG/DL (ref 70–99)
GLUCOSE SERPL-MCNC: 206 MG/DL (ref 70–99)
HCT VFR BLD AUTO: 26.5 % (ref 40–53)
HGB BLD-MCNC: 8.3 G/DL (ref 13.3–17.7)
LYMPHOCYTES # BLD AUTO: 0.8 10E9/L (ref 0.8–5.3)
LYMPHOCYTES NFR BLD AUTO: 14.8 %
MAGNESIUM SERPL-MCNC: 1.7 MG/DL (ref 1.6–2.3)
MCH RBC QN AUTO: 27.8 PG (ref 26.5–33)
MCHC RBC AUTO-ENTMCNC: 31.3 G/DL (ref 31.5–36.5)
MCV RBC AUTO: 89 FL (ref 78–100)
MONOCYTES # BLD AUTO: 0.3 10E9/L (ref 0–1.3)
MONOCYTES NFR BLD AUTO: 5.2 %
NEUTROPHILS # BLD AUTO: 4.1 10E9/L (ref 1.6–8.3)
NEUTROPHILS NFR BLD AUTO: 78.3 %
NRBC # BLD AUTO: 0 10*3/UL
NRBC BLD AUTO-RTO: 1 /100
PLATELET # BLD AUTO: 353 10E9/L (ref 150–450)
PLATELET # BLD EST: ABNORMAL 10*3/UL
POTASSIUM SERPL-SCNC: 4 MMOL/L (ref 3.4–5.3)
POTASSIUM SERPL-SCNC: 5.3 MMOL/L (ref 3.4–5.3)
PROT SERPL-MCNC: 6.6 G/DL (ref 6.8–8.8)
RBC # BLD AUTO: 2.99 10E12/L (ref 4.4–5.9)
RBC MORPH BLD: NORMAL
SODIUM SERPL-SCNC: 128 MMOL/L (ref 133–144)
SODIUM SERPL-SCNC: 128 MMOL/L (ref 133–144)
TACROLIMUS BLD-MCNC: 10 UG/L (ref 5–15)
TME LAST DOSE: NORMAL H
WBC # BLD AUTO: 5.3 10E9/L (ref 4–11)

## 2020-09-05 PROCEDURE — 00000146 ZZHCL STATISTIC GLUCOSE BY METER IP

## 2020-09-05 PROCEDURE — 25000132 ZZH RX MED GY IP 250 OP 250 PS 637: Performed by: STUDENT IN AN ORGANIZED HEALTH CARE EDUCATION/TRAINING PROGRAM

## 2020-09-05 PROCEDURE — 80053 COMPREHEN METABOLIC PANEL: CPT | Performed by: STUDENT IN AN ORGANIZED HEALTH CARE EDUCATION/TRAINING PROGRAM

## 2020-09-05 PROCEDURE — 25000128 H RX IP 250 OP 636: Performed by: STUDENT IN AN ORGANIZED HEALTH CARE EDUCATION/TRAINING PROGRAM

## 2020-09-05 PROCEDURE — 25000132 ZZH RX MED GY IP 250 OP 250 PS 637: Performed by: NURSE PRACTITIONER

## 2020-09-05 PROCEDURE — 80197 ASSAY OF TACROLIMUS: CPT | Performed by: NURSE PRACTITIONER

## 2020-09-05 PROCEDURE — 83735 ASSAY OF MAGNESIUM: CPT | Performed by: STUDENT IN AN ORGANIZED HEALTH CARE EDUCATION/TRAINING PROGRAM

## 2020-09-05 PROCEDURE — 36415 COLL VENOUS BLD VENIPUNCTURE: CPT | Performed by: STUDENT IN AN ORGANIZED HEALTH CARE EDUCATION/TRAINING PROGRAM

## 2020-09-05 PROCEDURE — 80048 BASIC METABOLIC PNL TOTAL CA: CPT | Performed by: NURSE PRACTITIONER

## 2020-09-05 PROCEDURE — 97110 THERAPEUTIC EXERCISES: CPT | Mod: GO | Performed by: OCCUPATIONAL THERAPIST

## 2020-09-05 PROCEDURE — 36415 COLL VENOUS BLD VENIPUNCTURE: CPT | Performed by: NURSE PRACTITIONER

## 2020-09-05 PROCEDURE — 85025 COMPLETE CBC W/AUTO DIFF WBC: CPT | Performed by: STUDENT IN AN ORGANIZED HEALTH CARE EDUCATION/TRAINING PROGRAM

## 2020-09-05 PROCEDURE — 25000131 ZZH RX MED GY IP 250 OP 636 PS 637: Performed by: STUDENT IN AN ORGANIZED HEALTH CARE EDUCATION/TRAINING PROGRAM

## 2020-09-05 PROCEDURE — 21400000 ZZH R&B CCU UMMC

## 2020-09-05 PROCEDURE — 25000132 ZZH RX MED GY IP 250 OP 250 PS 637: Performed by: INTERNAL MEDICINE

## 2020-09-05 PROCEDURE — 25000128 H RX IP 250 OP 636: Performed by: NURSE PRACTITIONER

## 2020-09-05 PROCEDURE — 99232 SBSQ HOSP IP/OBS MODERATE 35: CPT | Performed by: INTERNAL MEDICINE

## 2020-09-05 PROCEDURE — 25000131 ZZH RX MED GY IP 250 OP 636 PS 637: Performed by: NURSE PRACTITIONER

## 2020-09-05 RX ORDER — VALGANCICLOVIR 450 MG/1
450 TABLET, FILM COATED ORAL DAILY
Status: DISCONTINUED | OUTPATIENT
Start: 2020-09-06 | End: 2020-09-15

## 2020-09-05 RX ADMIN — Medication 5000 UNITS: at 11:23

## 2020-09-05 RX ADMIN — TACROLIMUS 2 MG: 1 CAPSULE ORAL at 18:24

## 2020-09-05 RX ADMIN — TACROLIMUS 2 MG: 1 CAPSULE ORAL at 09:41

## 2020-09-05 RX ADMIN — INSULIN ASPART 2 UNITS: 100 INJECTION, SOLUTION INTRAVENOUS; SUBCUTANEOUS at 15:50

## 2020-09-05 RX ADMIN — Medication 12.5 MG: at 19:50

## 2020-09-05 RX ADMIN — Medication 12.5 MG: at 13:38

## 2020-09-05 RX ADMIN — ACETAMINOPHEN 650 MG: 325 TABLET, FILM COATED ORAL at 16:13

## 2020-09-05 RX ADMIN — MYCOPHENOLATE MOFETIL 1000 MG: 500 TABLET ORAL at 09:41

## 2020-09-05 RX ADMIN — VALGANCICLOVIR 900 MG: 450 TABLET, FILM COATED ORAL at 09:42

## 2020-09-05 RX ADMIN — POSACONAZOLE 300 MG: 100 TABLET, DELAYED RELEASE ORAL at 09:47

## 2020-09-05 RX ADMIN — ACETAMINOPHEN 650 MG: 325 TABLET, FILM COATED ORAL at 03:16

## 2020-09-05 RX ADMIN — Medication 5000 UNITS: at 22:42

## 2020-09-05 RX ADMIN — MAGNESIUM SULFATE IN WATER 2 G: 40 INJECTION, SOLUTION INTRAVENOUS at 08:14

## 2020-09-05 RX ADMIN — ASPIRIN 81 MG CHEWABLE TABLET 81 MG: 81 TABLET CHEWABLE at 09:41

## 2020-09-05 RX ADMIN — INSULIN ASPART 3 UNITS: 100 INJECTION, SOLUTION INTRAVENOUS; SUBCUTANEOUS at 19:49

## 2020-09-05 RX ADMIN — Medication 12.5 MG: at 09:47

## 2020-09-05 RX ADMIN — POTASSIUM CHLORIDE 20 MEQ: 750 TABLET, EXTENDED RELEASE ORAL at 09:41

## 2020-09-05 RX ADMIN — Medication 1 TABLET: at 22:42

## 2020-09-05 RX ADMIN — CEFDINIR 300 MG: 300 CAPSULE ORAL at 19:50

## 2020-09-05 RX ADMIN — MYCOPHENOLATE MOFETIL 1000 MG: 500 TABLET ORAL at 18:24

## 2020-09-05 RX ADMIN — ACETAMINOPHEN 650 MG: 325 TABLET, FILM COATED ORAL at 20:35

## 2020-09-05 RX ADMIN — CEFDINIR 300 MG: 300 CAPSULE ORAL at 09:47

## 2020-09-05 RX ADMIN — Medication 1 TABLET: at 11:22

## 2020-09-05 RX ADMIN — ROSUVASTATIN CALCIUM 10 MG: 10 TABLET, FILM COATED ORAL at 22:42

## 2020-09-05 ASSESSMENT — ACTIVITIES OF DAILY LIVING (ADL)
ADLS_ACUITY_SCORE: 13

## 2020-09-05 ASSESSMENT — PAIN DESCRIPTION - DESCRIPTORS
DESCRIPTORS: HEADACHE
DESCRIPTORS: SORE

## 2020-09-05 ASSESSMENT — MIFFLIN-ST. JEOR: SCORE: 1535.69

## 2020-09-05 NOTE — PLAN OF CARE
Discharge Planner OT   Patient plan for discharge: Not discussed today  Current status: Pt completing STS independently. Pt ambulating ~275 ft with w/c following and SBA. Pt requesting to sit just outside of room door. Pt reporting SOB, fatigue and OMNI score of 8. After ~2 min seated rest and ambulation into room pt reporting OMNI score of 3. Pt refusing UE/LE exercises.  Barriers to return to prior living situation: Deconditioning, decreased activity tolerance, medical status  Recommendations for discharge: Home with assist and OP CR  Rationale for recommendations: Pt will likely benefit from assistance with ADL/IADL at home due to decreased activity tolerance. Pt will likely benefit from continuing skilled cardiac rehab services to build functional strength and endurance for safety and independence with ADL/IADL.       Entered by: Cate Villatoro 09/05/2020 2:29 PM

## 2020-09-05 NOTE — PROGRESS NOTES
[facility]   Cardiology II Service / Advanced Heart Failure  Daily Progress Note      Patient: Mariusz Sharp  MRN: [id number]  Admission Date: 8/31/2020  Hospital Day # 5    Assessment and Plan: Mariusz Sharp is a 59 year old male with history of CAD, LV thrombus, CKD Stage III, PAF, DM Type II, and ICM s/p HM III LVAD as BTT with subsequent OHT 5/18/20. He presented with progressive SOB, cough, and fever.     Today's Plan:  -monitor chest pain, likely pleuritic/MSK with coughing fits  -f/u PM tacrolimus level and adjust prn  -discontinue nystatin as on posa and off prednisone  -encourage PO intake today  -f/u Immuknow, blast, histo, PJP    #Fever with cough secondary to RUL mass. Tmax 102 prior to admission. History of recent rhinovirus. CDIF, LA, UA, COVID, respiratory viral panel Legionella, Cryptococcus, Strep pneumo negative, Aspergillus, CMV, and EBV all negative. Renal US notes resolution of prior stone. Chest X-ray negative. History of PAcnes to outflow graft and Donor positive S Anginosus and Veillonella (completed course of antibiotics). Concern for Chronic Prostatitis. Vanco/Zosyn discontinued 9/1. CT Chest positive for RUL mass concerning for PTLD vs infection. Note symptoms worse following cessation of antibiotics.   - Transplant ID following, appreciate recs.    - Beta D glucan 90 and trending upward   - started on posaconazole 9/4, 300 mg daily   - posaconazole level in 5 days (ordered)  - Blood cultures no growth to date   - Blastomyces, histoplasma, PJP PCR pending  - Aggressive pulmonary toileting and nebs.   -  IR Pulmonary following, plan for EBUS Tuesday       -Send BAL for immunocompromised studies               -- tissue for histopathology, AFB, bacterial and fungal stains                -- tissue for bacterial, AFB and fungal cultures               - iIf able to get additional tissue, please send fresh sample for fungal DNA 28S rRNA  - Continue Cefdinir 300 mg BID through  10/1/20 for prostatitis      #Syncope, resolved. Occurred with cough prior to admission. MAPs stable.   - Encourage PO fluids.   - Aggressive pulmonary toileting and nebs.      #Diarrhea, improving.   Few loose stools daily, improving overall.   - CDIF negative and CMV negative.     # Status post OHT on 5/18/20, history of NICM and LVAD  # Chronic immunosuppression  Echo 8/31 with EF 70% and normal graft function. mRA-3, RV-20/2, mPA-15, mPCWP-10, AMRIT CO-4.91, AMIRT CI-2.63 (weight 178 lb), biopsy negative.     Graft Function:   --Volume: euvolemic to hypovolemic, enc PO fluids  --BP: 103-136/80s on hydralazine 12.5 mg tid  --HR: 110s-120s in sinus, baseline 100s-110s, tachycardia likely 2/2 infection     Immunosuppression:   --recently off prednisone per taper protocol  --Cellcept 1000 mg BID  --tacrolimus 2 mg BID decreased 9/4 after posaconazole started (goal 8-10), trough today pending, daily troughs for now  -Immuknow 9/3 pending    Serostatus: CMV: D+, R-, EBV: D- R+, Toxo D- R-    Prophylaxis:   --CAV: aspirin 81 mg and rosuvastatin 10 mg daily  --Thrush: Nystatin dc'ed today   --PCP: Bactrim caused hyperK, treated with pentamidine, no longer indicated as off prednisone  --GI: Protonix   --Osteoporosis: calcium/vitamin D   --CMV: D+/R-, Valcyte 900 mg daily x 6 mos (through November)    #ANDREW  #Hyponatremia likely hypovolemic  Cr 1.4 today from 1.1 - suspect 2/2 supratherapeutic tacro level, this is pending. Na 128 from 134, possibly due to hypovolemia.  -f/u tacrolimus level  -encourage PO fluids    #HTN. Prior to admission Norvasc on hold.  - hydralazine 12.5 mg TID (pta 25 mg TID)  - monitor      #DM Type II.   - Lantus 24 units at HS.   - Novolog preprandial with medium intensity sliding scale insulin.      #Elevated PSA with Concern for Chronic Prostatitis. PSA-8.21. MRI consistent with BPH.   - Continue Cefdinir 300 mg BID through 10/1/20      FEN: diabetic diet  PROPHY:  Ambulatory, subQ heparin,  "PPI  LINES:  PIV  DISPO:  Pending EBUS  CODE STATUS:  Full code    Dinora Harper, LEX, NP-C  Advanced Heart Failure/Cardiology II Service  Pager [phone] [facility] [id number]    ================================================================    Subjective/24-Hr Events:   Last 24 hr care team notes reviewed. No overnight events. Breathing feels better today. Having some rib pain with coughing fits. He denies lightheadedness or dizziness. No fevers or chills, occasionally feels flushed. Had 3 soft BMs in the last 24 hr, improved. No new complaints.    ROS:  4 point ROS including respiratory, CV, GI and  (other than that noted in the HPI) is negative.     Medications: Reviewed in EPIC.     Physical Exam:   /82 (BP Location: Left arm)   Pulse 123   Temp 98.3  F (36.8  C) (Oral)   Resp 18   Ht 1.626 m (5' 4\")   Wt 80 kg (176 lb 4.8 oz)   SpO2 97%   BMI 30.26 kg/m      GENERAL: Appears comfortable, in no distress, fatigued.  HEENT: Eye symmetrical, no discharge or icterus bilaterally. Mucous membranes moist and without lesions.  NECK: Supple, JVD not visible at 90 degrees.   CV: Tachycardic and regular, +S1S2, no murmur, rub, or gallop.   RESPIRATORY: Respirations regular, even, and unlabored. Lungs CTA throughout. No crackles or wheezes.  GI: Soft, obese and non distended with normoactive bowel sounds present in all quadrants. No tenderness, rebound, guarding.   EXTREMITIES: No peripheral edema. 2+ bilateral pedal pulses.   NEUROLOGIC: Alert and oriented x 3. No focal deficits.   MUSCULOSKELETAL: No joint swelling or tenderness.   SKIN: No jaundice. No rashes or lesions.     Labs:  CMP  Recent Labs   Lab 09/05/20  0549 09/04/20  0543 09/03/20  0526 09/02/20  0551   * 134 136 137   POTASSIUM 4.0 3.9 4.2 4.1   CHLORIDE 99 104 106 107   CO2 20 22 23 22   ANIONGAP 8 8 7 7   * 115* 191* 126*   BUN 22 17 23 24   CR 1.41* 1.16 1.13 1.32*   GFRESTIMATED 55* 69 71 59*   GFRESTBLACK 63 80 83 69   ARLENE 8.6 9.0 " 8.6 8.5   MAG 1.7 1.7 1.6 1.9   PROTTOTAL 6.6* 6.5* 6.4* 6.0*   ALBUMIN 2.5* 2.6* 2.5* 2.3*   BILITOTAL 0.8 0.8 0.4 0.5   ALKPHOS 68 66 66 58   AST 22 21 19 19   ALT 15 12 16 14       CBC  Recent Labs   Lab 09/05/20  0549 09/04/20  0543 09/03/20  0526 09/02/20  0551   WBC 5.3 3.8* 3.1* 2.8*   RBC 2.99* 3.15* 3.03* 2.95*   HGB 8.3* 8.7* 8.3* 8.1*   HCT 26.5* 27.5* 27.0* 26.7*   MCV 89 87 89 91   MCH 27.8 27.6 27.4 27.5   MCHC 31.3* 31.6 30.7* 30.3*   RDW 13.9 14.2 14.1 14.3    376 333 354       Time/Communication  I personally spent a total of 25 minutes. Of that 15 minutes was counseling/coordination of patient's care. Plan of care discussed with patient. See my note above for details.    Patient discussed with Dr. Ortiz.        I have seen and examined the patient as part of a shared visit with Marcy Harper NP.  I agree with the note above. I reviewed today's vital signs and medications. I have reviewed and discussed with the advanced practice provider their physical examination, assessment, and plan   Briefly,recent transplant admitted with ongoing fever and cough.  Chest CT positive for RUL mass concerning for PTLD vs infection  My key history/exam findings are: hemodynamically stable.  Grossly euvolemic on exam this afternoon.  The key management decisions made by me: Continue antifungal treatment.  Plan for EBUS on Tuesday.  Monitor tacrolimus levels.      Jenni Ortiz MD  Section Head - Advanced Heart Failure, Transplantation and Mechanical Circulatory Support  Director - [facility]  Associate Professor of Medicine, [facility]

## 2020-09-05 NOTE — PLAN OF CARE
Pt admitted 8/31 following syncopal episode, cough, and fever. PMH includes CAD s/p STEMI with ALYSSA to LAD (2018), ICM s/p HM3 LVAD (2018) s/p OHT (05/2020), monomorphic VT s/p ICD (2018), LV thrombus, CKD III, HTN, HLD, and DM2.    Neuro: A&O x 4. Calls appropriately. Flat affect. Afebrile. Denies headache. Slept well overnight.  Cardiac: 's-120's on monitor overnight. Called cardiology overnight to alert provider for HR >120 per order. MD will discuss increasing parameters with day team. Pt denies chest pain, palpitations, or dizziness overnight.   Respiratory: Sating well on 1L nasal canula most of the shift. Pt denied SOB throughout shift but wore O2 for comfort. Pt removed O2 overnight but maintained good O2 sats. Lung sounds clear. Frequent cough with scant production. Pt declined PRN lozenges.   GI/: Voiding into bedside urinal. BM overnight. Denies nausea.   Endocrine: ACHS blood sugar checks with sliding scale insulin.  at HS. Gave one unit of insulin.  Diet/Appetite: 2G Na diet. 2L FR.  Skin: No deficits noted.  LDA: R PIV SL. Flushing well.  Activity: Up ad domingo  Pain: Complained of pain in sternum and headache from coughing spells. Gave PRN Tylenol 1x with relief.  Labs: Magnesium 1.7 this morning. Potassium 4.0. Both need replacement per protocol.     Plan: Continue to monitor and alert team with any changes or concerns.

## 2020-09-05 NOTE — PLAN OF CARE
Pt admit 8/31 with progressive cough, SOB and fever. Pt A/O. VSS. ST. 0-2L O2 - 2L NC applied as pt sats noted to be as low as ~86% when pt resting/sleeping. Frequent dry cough - pt endorsed having some R sided rib pain this morning following coughing (NP updated) with breathing, last few minutes, did not persist. Declines lozenges. PRN Tylenol given for sore throat. Pt denies SOB at rest day. BG assessed and managed per protocol. Voiding in urinal. +BMs. This morning K+ (4.0) and Mg (1.7) replaced per protocols. Evening K+ check 5.3, MD updated. Up ad domingo in room. Continue with plan of care and report changes to treatment team. Plan for EBUS on Tuesday.

## 2020-09-06 LAB
ANION GAP SERPL CALCULATED.3IONS-SCNC: 7 MMOL/L (ref 3–14)
BACTERIA SPEC CULT: NO GROWTH
BACTERIA SPEC CULT: NO GROWTH
BUN SERPL-MCNC: 23 MG/DL (ref 7–30)
CALCIUM SERPL-MCNC: 8.6 MG/DL (ref 8.5–10.1)
CHLORIDE SERPL-SCNC: 98 MMOL/L (ref 94–109)
CO2 SERPL-SCNC: 22 MMOL/L (ref 20–32)
CREAT SERPL-MCNC: 1.35 MG/DL (ref 0.66–1.25)
ERYTHROCYTE [DISTWIDTH] IN BLOOD BY AUTOMATED COUNT: 13.9 % (ref 10–15)
GFR SERPL CREATININE-BSD FRML MDRD: 58 ML/MIN/{1.73_M2}
GLUCOSE BLDC GLUCOMTR-MCNC: 215 MG/DL (ref 70–99)
GLUCOSE BLDC GLUCOMTR-MCNC: 225 MG/DL (ref 70–99)
GLUCOSE BLDC GLUCOMTR-MCNC: 228 MG/DL (ref 70–99)
GLUCOSE BLDC GLUCOMTR-MCNC: 232 MG/DL (ref 70–99)
GLUCOSE SERPL-MCNC: 234 MG/DL (ref 70–99)
HCT VFR BLD AUTO: 26.6 % (ref 40–53)
HGB BLD-MCNC: 8.4 G/DL (ref 13.3–17.7)
MAGNESIUM SERPL-MCNC: 1.7 MG/DL (ref 1.6–2.3)
MCH RBC QN AUTO: 27.4 PG (ref 26.5–33)
MCHC RBC AUTO-ENTMCNC: 31.6 G/DL (ref 31.5–36.5)
MCV RBC AUTO: 87 FL (ref 78–100)
P JIROVECII DNA SPEC QL NAA+PROBE: NOT DETECTED
PLATELET # BLD AUTO: 322 10E9/L (ref 150–450)
POTASSIUM SERPL-SCNC: 4.2 MMOL/L (ref 3.4–5.3)
RBC # BLD AUTO: 3.07 10E12/L (ref 4.4–5.9)
SODIUM SERPL-SCNC: 128 MMOL/L (ref 133–144)
SPECIMEN SOURCE: NORMAL
TACROLIMUS BLD-MCNC: 15.1 UG/L (ref 5–15)
TME LAST DOSE: ABNORMAL H
WBC # BLD AUTO: 5.9 10E9/L (ref 4–11)

## 2020-09-06 PROCEDURE — 25000132 ZZH RX MED GY IP 250 OP 250 PS 637: Performed by: STUDENT IN AN ORGANIZED HEALTH CARE EDUCATION/TRAINING PROGRAM

## 2020-09-06 PROCEDURE — 25000132 ZZH RX MED GY IP 250 OP 250 PS 637: Performed by: NURSE PRACTITIONER

## 2020-09-06 PROCEDURE — 25000128 H RX IP 250 OP 636: Performed by: NURSE PRACTITIONER

## 2020-09-06 PROCEDURE — 83735 ASSAY OF MAGNESIUM: CPT | Performed by: STUDENT IN AN ORGANIZED HEALTH CARE EDUCATION/TRAINING PROGRAM

## 2020-09-06 PROCEDURE — 80197 ASSAY OF TACROLIMUS: CPT | Performed by: NURSE PRACTITIONER

## 2020-09-06 PROCEDURE — 00000146 ZZHCL STATISTIC GLUCOSE BY METER IP

## 2020-09-06 PROCEDURE — 25000131 ZZH RX MED GY IP 250 OP 636 PS 637: Performed by: STUDENT IN AN ORGANIZED HEALTH CARE EDUCATION/TRAINING PROGRAM

## 2020-09-06 PROCEDURE — 25000131 ZZH RX MED GY IP 250 OP 636 PS 637: Performed by: NURSE PRACTITIONER

## 2020-09-06 PROCEDURE — 21400000 ZZH R&B CCU UMMC

## 2020-09-06 PROCEDURE — 85027 COMPLETE CBC AUTOMATED: CPT | Performed by: STUDENT IN AN ORGANIZED HEALTH CARE EDUCATION/TRAINING PROGRAM

## 2020-09-06 PROCEDURE — 99232 SBSQ HOSP IP/OBS MODERATE 35: CPT | Performed by: NURSE PRACTITIONER

## 2020-09-06 PROCEDURE — 36415 COLL VENOUS BLD VENIPUNCTURE: CPT | Performed by: NURSE PRACTITIONER

## 2020-09-06 PROCEDURE — 36415 COLL VENOUS BLD VENIPUNCTURE: CPT | Performed by: STUDENT IN AN ORGANIZED HEALTH CARE EDUCATION/TRAINING PROGRAM

## 2020-09-06 PROCEDURE — 80048 BASIC METABOLIC PNL TOTAL CA: CPT | Performed by: STUDENT IN AN ORGANIZED HEALTH CARE EDUCATION/TRAINING PROGRAM

## 2020-09-06 PROCEDURE — 25800030 ZZH RX IP 258 OP 636: Performed by: NURSE PRACTITIONER

## 2020-09-06 PROCEDURE — 25000132 ZZH RX MED GY IP 250 OP 250 PS 637: Performed by: INTERNAL MEDICINE

## 2020-09-06 RX ORDER — POTASSIUM CHLORIDE 29.8 MG/ML
20 INJECTION INTRAVENOUS
Status: DISCONTINUED | OUTPATIENT
Start: 2020-09-06 | End: 2020-09-24 | Stop reason: HOSPADM

## 2020-09-06 RX ORDER — TACROLIMUS 1 MG/1
1 CAPSULE ORAL
Status: DISCONTINUED | OUTPATIENT
Start: 2020-09-06 | End: 2020-09-07

## 2020-09-06 RX ORDER — MAGNESIUM SULFATE HEPTAHYDRATE 40 MG/ML
2 INJECTION, SOLUTION INTRAVENOUS DAILY PRN
Status: DISCONTINUED | OUTPATIENT
Start: 2020-09-06 | End: 2020-09-24 | Stop reason: HOSPADM

## 2020-09-06 RX ORDER — POTASSIUM CL/LIDO/0.9 % NACL 10MEQ/0.1L
10 INTRAVENOUS SOLUTION, PIGGYBACK (ML) INTRAVENOUS
Status: DISCONTINUED | OUTPATIENT
Start: 2020-09-06 | End: 2020-09-24 | Stop reason: HOSPADM

## 2020-09-06 RX ORDER — POTASSIUM CHLORIDE 750 MG/1
20-40 TABLET, EXTENDED RELEASE ORAL
Status: DISCONTINUED | OUTPATIENT
Start: 2020-09-06 | End: 2020-09-24 | Stop reason: HOSPADM

## 2020-09-06 RX ORDER — TACROLIMUS 1 MG/1
2 CAPSULE ORAL
Status: DISCONTINUED | OUTPATIENT
Start: 2020-09-07 | End: 2020-09-07

## 2020-09-06 RX ORDER — ACETAMINOPHEN 325 MG/1
975 TABLET ORAL EVERY 6 HOURS PRN
Status: DISCONTINUED | OUTPATIENT
Start: 2020-09-06 | End: 2020-09-22

## 2020-09-06 RX ORDER — MAGNESIUM SULFATE HEPTAHYDRATE 40 MG/ML
4 INJECTION, SOLUTION INTRAVENOUS EVERY 4 HOURS PRN
Status: DISCONTINUED | OUTPATIENT
Start: 2020-09-06 | End: 2020-09-24 | Stop reason: HOSPADM

## 2020-09-06 RX ORDER — POTASSIUM CHLORIDE 1.5 G/1.58G
20-40 POWDER, FOR SOLUTION ORAL
Status: DISCONTINUED | OUTPATIENT
Start: 2020-09-06 | End: 2020-09-24 | Stop reason: HOSPADM

## 2020-09-06 RX ORDER — LIDOCAINE 4 G/G
1 PATCH TOPICAL
Status: DISCONTINUED | OUTPATIENT
Start: 2020-09-06 | End: 2020-09-24 | Stop reason: HOSPADM

## 2020-09-06 RX ORDER — LIDOCAINE 4 G/G
1 PATCH TOPICAL
Status: DISCONTINUED | OUTPATIENT
Start: 2020-09-06 | End: 2020-09-06

## 2020-09-06 RX ORDER — POTASSIUM CHLORIDE 7.45 MG/ML
10 INJECTION INTRAVENOUS
Status: DISCONTINUED | OUTPATIENT
Start: 2020-09-06 | End: 2020-09-24 | Stop reason: HOSPADM

## 2020-09-06 RX ADMIN — Medication 1 TABLET: at 21:38

## 2020-09-06 RX ADMIN — MYCOPHENOLATE MOFETIL 1000 MG: 500 TABLET ORAL at 08:05

## 2020-09-06 RX ADMIN — MYCOPHENOLATE MOFETIL 1000 MG: 500 TABLET ORAL at 17:33

## 2020-09-06 RX ADMIN — INSULIN ASPART 2 UNITS: 100 INJECTION, SOLUTION INTRAVENOUS; SUBCUTANEOUS at 13:10

## 2020-09-06 RX ADMIN — POSACONAZOLE 300 MG: 100 TABLET, DELAYED RELEASE ORAL at 08:04

## 2020-09-06 RX ADMIN — Medication 12.5 MG: at 20:16

## 2020-09-06 RX ADMIN — MAGNESIUM SULFATE IN WATER 2 G: 40 INJECTION, SOLUTION INTRAVENOUS at 08:01

## 2020-09-06 RX ADMIN — ROSUVASTATIN CALCIUM 10 MG: 10 TABLET, FILM COATED ORAL at 21:38

## 2020-09-06 RX ADMIN — Medication 5000 UNITS: at 22:57

## 2020-09-06 RX ADMIN — Medication 1 TABLET: at 09:53

## 2020-09-06 RX ADMIN — INSULIN GLARGINE 28 UNITS: 100 INJECTION, SOLUTION SUBCUTANEOUS at 21:36

## 2020-09-06 RX ADMIN — CEFDINIR 300 MG: 300 CAPSULE ORAL at 20:16

## 2020-09-06 RX ADMIN — SODIUM CHLORIDE 500 ML: 9 INJECTION, SOLUTION INTRAVENOUS at 08:00

## 2020-09-06 RX ADMIN — INSULIN ASPART 2 UNITS: 100 INJECTION, SOLUTION INTRAVENOUS; SUBCUTANEOUS at 08:07

## 2020-09-06 RX ADMIN — INSULIN ASPART 2 UNITS: 100 INJECTION, SOLUTION INTRAVENOUS; SUBCUTANEOUS at 17:29

## 2020-09-06 RX ADMIN — ACETAMINOPHEN 650 MG: 325 TABLET, FILM COATED ORAL at 06:25

## 2020-09-06 RX ADMIN — VALGANCICLOVIR 450 MG: 450 TABLET, FILM COATED ORAL at 08:05

## 2020-09-06 RX ADMIN — TACROLIMUS 1 MG: 1 CAPSULE ORAL at 17:33

## 2020-09-06 RX ADMIN — Medication 5000 UNITS: at 11:53

## 2020-09-06 RX ADMIN — TACROLIMUS 2 MG: 1 CAPSULE ORAL at 08:05

## 2020-09-06 RX ADMIN — LIDOCAINE 1 PATCH: 560 PATCH PERCUTANEOUS; TOPICAL; TRANSDERMAL at 20:18

## 2020-09-06 RX ADMIN — ACETAMINOPHEN 975 MG: 325 TABLET, FILM COATED ORAL at 17:33

## 2020-09-06 RX ADMIN — ASPIRIN 81 MG CHEWABLE TABLET 81 MG: 81 TABLET CHEWABLE at 08:05

## 2020-09-06 RX ADMIN — Medication 12.5 MG: at 08:04

## 2020-09-06 RX ADMIN — CEFDINIR 300 MG: 300 CAPSULE ORAL at 08:05

## 2020-09-06 RX ADMIN — Medication 12.5 MG: at 14:37

## 2020-09-06 ASSESSMENT — ACTIVITIES OF DAILY LIVING (ADL)
ADLS_ACUITY_SCORE: 13

## 2020-09-06 ASSESSMENT — PAIN DESCRIPTION - DESCRIPTORS
DESCRIPTORS: SORE
DESCRIPTORS: SORE

## 2020-09-06 NOTE — PROGRESS NOTES
[facility]   Cardiology II Service / Advanced Heart Failure  Daily Progress Note      Patient: Mariusz Sharp  MRN: [id number]  Admission Date: 8/31/2020  Hospital Day # 6    Assessment and Plan: Mariusz Sharp is a 59 year old male with history of CAD, LV thrombus, CKD Stage III, PAF, DM Type II, and ICM s/p HM III LVAD as BTT with subsequent OHT 5/18/20. He presented with progressive SOB, cough, and fever.     Today's Plan:  -NS bolus 500ml  -decrease tacrolimus to 2 mg in AM 1 mg in PM  -f/u Immuknow, blast, histo, PJP    #Fever with cough secondary to RUL mass. Tmax 102 prior to admission. History of recent rhinovirus. CDIF, LA, UA, COVID, respiratory viral panel Legionella, Cryptococcus, Strep pneumo negative, Aspergillus, CMV, and EBV all negative. Renal US notes resolution of prior stone. Chest X-ray negative. History of PAcnes to outflow graft and Donor positive S Anginosus and Veillonella (completed course of antibiotics). Concern for Chronic Prostatitis. Vanco/Zosyn discontinued 9/1. CT Chest positive for RUL mass concerning for PTLD vs infection. Note symptoms worse following cessation of antibiotics.   - Transplant ID following, appreciate recs.    - Beta D glucan 90 and trending upward   - started on posaconazole 9/4, 300 mg daily   - posaconazole level Wednesday (after 5 days)  - Blood cultures no growth to date   - Blastomyces, histoplasma, PJP PCR pending  - Aggressive pulmonary toileting and nebs   -  IR Pulmonary following, plan for EBUS Tuesday       - Send BAL for immunocompromised studies               -- tissue for histopathology, AFB, bacterial and fungal stains                -- tissue for bacterial, AFB and fungal cultures               -- if able to get additional tissue, please send fresh sample for fungal DNA 28S rRNA  - Continue Cefdinir 300 mg BID through 10/1/20 for prostatitis      # Status post OHT on 5/18/20, history of NICM and LVAD  # Chronic  immunosuppression  Echo 8/31 with EF 70% and normal graft function. mRA-3, RV-20/2, mPA-15, mPCWP-10, AMRIT CO-4.91, AMRIT CI-2.63 (weight 178 lb), biopsy negative.     Graft Function:   --Volume: euvolemic to hypovolemic, IVF today as below  --BP: /60-95 on hydralazine 12.5 mg tid  --HR: 110s-120s in sinus, baseline 100s-110s, tachycardia likely 2/2 infection     Immunosuppression:   --recently off prednisone per taper protocol  --Cellcept 1000 mg BID  --decrease tacrolimus to 2 mg in AM 1 mg in PM, trough 15.1 today (10 hr), goal 8-10, daily troughs for now  -Immuknow 9/3 pending    Serostatus: CMV: D+, R-, EBV: D- R+, Toxo D- R-    Prophylaxis:   --CAV: aspirin 81 mg and rosuvastatin 10 mg daily  --Thrush: on posaconazole  --PCP: Bactrim caused hyperK, treated with pentamidine, no longer indicated as off prednisone  --GI: Protonix   --Osteoporosis: calcium/vitamin D   --CMV: D+/R-, Valcyte renally dosed 450 mg daily x 6 mos (through November)    #ANDREW  #Hyponatremia likely hypovolemic  Cr 1.4 from 1.1 - suspect prerenal ANDREW +supratherpeutic tac level. Na 128 from 134, possibly due to hypovolemia.  -decrease tacrolimus as above  -NS 500mL    #HTN. Prior to admission Norvasc on hold.  - hydralazine 12.5 mg TID (pta 25 mg TID)  - monitor     #Syncope, resolved. Occurred with cough prior to admission. MAPs stable.   - Aggressive pulmonary toileting and nebs.      #Diarrhea, improving.   Few loose stools daily, improving overall.   - CDIF negative and CMV negative.      #DM Type II.   - Lantus 24 units at HS.   - Novolog preprandial with medium intensity sliding scale insulin.      #Elevated PSA with Concern for Chronic Prostatitis. PSA-8.21. MRI consistent with BPH.   - Continue Cefdinir 300 mg BID through 10/1/20    FEN: diabetic diet  PROPHY:  subQ heparin HOLD PRIOR TO EBUS, PPI  LINES:  PIV  DISPO:  Pending EBUS  CODE STATUS:  Full code    Dinora Harper, LEX, NP-C  Advanced Heart Failure/Cardiology II  "Service  Pager [phone] [facility] [id number]    ================================================================    Subjective/24-Hr Events:   Last 24 hr care team notes reviewed. No overnight events. Feeling a little \"fuzzy\" since yesterday. Denies HA. Cough better overall, non productive. Denies shortness of breath or lightheadedness. No fevers or chills.    ROS:  4 point ROS including respiratory, CV, GI and  (other than that noted in the HPI) is negative.     Medications: Reviewed in EPIC.     Physical Exam:   BP (!) 134/94 (BP Location: Left arm)   Pulse 119   Temp 98  F (36.7  C) (Oral)   Resp 18   Ht 1.626 m (5' 4\")   Wt 80 kg (176 lb 4.8 oz)   SpO2 97%   BMI 30.26 kg/m      GENERAL: Appears comfortable, in no distress, fatigued.  HEENT: Eye symmetrical, no discharge or icterus bilaterally. Mucous membranes moist and without lesions.  NECK: Supple, JVD not visible at 45 degrees.   CV: Tachycardic and regular, +S1S2, no murmur, rub, or gallop.   RESPIRATORY: Respirations regular, even, and unlabored. Lungs CTA throughout. No crackles or wheezes.  GI: Soft, obese and non distended with normoactive bowel sounds present in all quadrants. No tenderness, rebound, guarding.   EXTREMITIES: No peripheral edema. 2+ bilateral pedal pulses.   NEUROLOGIC: Alert and oriented x 3. No focal deficits.   MUSCULOSKELETAL: No joint swelling or tenderness.   SKIN: No jaundice. No rashes or lesions.     Labs:  CMP  Recent Labs   Lab 09/06/20  0429 09/05/20  1403 09/05/20  0549 09/04/20  0543 09/03/20  0526 09/02/20  0551   * 128* 128* 134 136 137   POTASSIUM 4.2 5.3 4.0 3.9 4.2 4.1   CHLORIDE 98 99 99 104 106 107   CO2 22 22 20 22 23 22   ANIONGAP 7 6 8 8 7 7   * 186* 206* 115* 191* 126*   BUN 23 21 22 17 23 24   CR 1.35* 1.38* 1.41* 1.16 1.13 1.32*   GFRESTIMATED 58* 56* 55* 69 71 59*   GFRESTBLACK 67 65 63 80 83 69   ARLENE 8.6 9.0 8.6 9.0 8.6 8.5   MAG 1.7  --  1.7 1.7 1.6 1.9   PROTTOTAL  --   --  6.6* 6.5* 6.4* " 6.0*   ALBUMIN  --   --  2.5* 2.6* 2.5* 2.3*   BILITOTAL  --   --  0.8 0.8 0.4 0.5   ALKPHOS  --   --  68 66 66 58   AST  --   --  22 21 19 19   ALT  --   --  15 12 16 14       CBC  Recent Labs   Lab 09/06/20  0429 09/05/20  0549 09/04/20  0543 09/03/20  0526   WBC 5.9 5.3 3.8* 3.1*   RBC 3.07* 2.99* 3.15* 3.03*   HGB 8.4* 8.3* 8.7* 8.3*   HCT 26.6* 26.5* 27.5* 27.0*   MCV 87 89 87 89   MCH 27.4 27.8 27.6 27.4   MCHC 31.6 31.3* 31.6 30.7*   RDW 13.9 13.9 14.2 14.1    353 376 333       Time/Communication  I personally spent a total of 25 minutes. Of that 15 minutes was counseling/coordination of patient's care. Plan of care discussed with patient. See my note above for details.    Patient discussed with Dr. Ortiz.

## 2020-09-06 NOTE — PLAN OF CARE
D: Admitted 8/31 d/t syncopal episodes, progressive SOB, cough, and fever. Found to have RUL mass on chest CT. Hx: CAD, LV thrombus, CKD Stage III, PAF, DM Type II, and ICM s/p HM III LVAD now s/p OHT 5/18/20    I: Monitored vitals and assessed pt status.   Changed:  Low Na, elevated creatinine. Cards 2 believes pt is dry, received 500 ml NS bolus.   Mg of 1.7 replaced per protocol, recheck in AM  Lido patch ordered for generalized back/shoulder pain. Pt requesting patch be placed at night.   Continues to have elevated blood sugars today, Cards 2 notified. Lantus dose increased to 28 units at bedtime.     Running:  N/A    PRN:  975 mg tylenol every 8 hours     A: VSS, A&O, up independently. Pt reports pain in throat, upper back and right shoulder d/t frequent coughing spells. Pt states he had one very bad coughing spell where he felt as if he was going to pass out. Vitals were stable and pt recovered when spell subsided. Tele ST, 's. Oxygen stable on RA while awake, requiring 2L NC when asleep. Minimal urine output today, (+) for BM.     Temp:  [97.4  F (36.3  C)-98.4  F (36.9  C)] 98  F (36.7  C)  Pulse:  [106-125] 115  Resp:  [16-30] 16  BP: ()/(60-94) 128/89  SpO2:  [95 %-98 %] 98 %      P: Continue to monitor Pt status and report changes to treatment team.

## 2020-09-06 NOTE — PLAN OF CARE
Pt admitted 8/31 following syncopal episode, cough, and fever. PMH includes CAD s/p STEMI with ALYSSA to LAD (2018), ICM s/p HM3 LVAD (2018) s/p OHT (05/2020), monomorphic VT s/p ICD (2018), LV thrombus, CKD III, HTN, HLD, and DM2.     Neuro: A&O x 4. Calls appropriately. Flat affect. Afebrile. Denies headache. Slept well overnight.  Cardiac: 's-120's on monitor overnight. Pt denies chest pain, palpitations, or dizziness overnight.   Respiratory: RA while awake. 2L nasal canula while sleeping for sats dropping to the 80's. Sating well with the 2L. Denies SOB throughout shift. Continues to have frequent dry cough.  GI/: Voiding into bedside urinal. BM overnight. Denies nausea.   Endocrine: ACHS blood sugar checks with sliding scale insulin.   Diet/Appetite: 2G Na diet. 2L FR.  Skin: No deficits noted.  LDA: R PIV SL. Flushing well.   Activity: Up ad domingo  Pain: Pt complained of pain from coughing so much. Gave Tylenol 2x PRN with relief.  Labs: Magnesium 1.7 this morning. Needs replacement per protocol.     Plan: Continue to monitor and alert team with any changes or concerns.

## 2020-09-07 ENCOUNTER — APPOINTMENT (OUTPATIENT)
Dept: GENERAL RADIOLOGY | Facility: CLINIC | Age: 60
End: 2020-09-07
Attending: STUDENT IN AN ORGANIZED HEALTH CARE EDUCATION/TRAINING PROGRAM
Payer: COMMERCIAL

## 2020-09-07 LAB
ANION GAP SERPL CALCULATED.3IONS-SCNC: 9 MMOL/L (ref 3–14)
BUN SERPL-MCNC: 25 MG/DL (ref 7–30)
CALCIUM SERPL-MCNC: 8.9 MG/DL (ref 8.5–10.1)
CHLORIDE SERPL-SCNC: 98 MMOL/L (ref 94–109)
CO2 SERPL-SCNC: 21 MMOL/L (ref 20–32)
CREAT SERPL-MCNC: 1.44 MG/DL (ref 0.66–1.25)
ERYTHROCYTE [DISTWIDTH] IN BLOOD BY AUTOMATED COUNT: 13.7 % (ref 10–15)
GFR SERPL CREATININE-BSD FRML MDRD: 53 ML/MIN/{1.73_M2}
GLUCOSE BLDC GLUCOMTR-MCNC: 198 MG/DL (ref 70–99)
GLUCOSE BLDC GLUCOMTR-MCNC: 204 MG/DL (ref 70–99)
GLUCOSE BLDC GLUCOMTR-MCNC: 219 MG/DL (ref 70–99)
GLUCOSE BLDC GLUCOMTR-MCNC: 219 MG/DL (ref 70–99)
GLUCOSE BLDC GLUCOMTR-MCNC: 221 MG/DL (ref 70–99)
GLUCOSE BLDC GLUCOMTR-MCNC: 239 MG/DL (ref 70–99)
GLUCOSE SERPL-MCNC: 266 MG/DL (ref 70–99)
HCT VFR BLD AUTO: 26.2 % (ref 40–53)
HGB BLD-MCNC: 8.2 G/DL (ref 13.3–17.7)
MAGNESIUM SERPL-MCNC: 1.8 MG/DL (ref 1.6–2.3)
MCH RBC QN AUTO: 27.8 PG (ref 26.5–33)
MCHC RBC AUTO-ENTMCNC: 31.3 G/DL (ref 31.5–36.5)
MCV RBC AUTO: 89 FL (ref 78–100)
PLATELET # BLD AUTO: 341 10E9/L (ref 150–450)
POTASSIUM SERPL-SCNC: 4.1 MMOL/L (ref 3.4–5.3)
RBC # BLD AUTO: 2.95 10E12/L (ref 4.4–5.9)
SODIUM SERPL-SCNC: 127 MMOL/L (ref 133–144)
TACROLIMUS BLD-MCNC: 14.9 UG/L (ref 5–15)
TME LAST DOSE: NORMAL H
TROPONIN I SERPL-MCNC: <0.015 UG/L (ref 0–0.04)
WBC # BLD AUTO: 6.8 10E9/L (ref 4–11)

## 2020-09-07 PROCEDURE — 25000132 ZZH RX MED GY IP 250 OP 250 PS 637: Performed by: STUDENT IN AN ORGANIZED HEALTH CARE EDUCATION/TRAINING PROGRAM

## 2020-09-07 PROCEDURE — 25000132 ZZH RX MED GY IP 250 OP 250 PS 637: Performed by: INTERNAL MEDICINE

## 2020-09-07 PROCEDURE — 25800030 ZZH RX IP 258 OP 636: Performed by: INTERNAL MEDICINE

## 2020-09-07 PROCEDURE — 83735 ASSAY OF MAGNESIUM: CPT | Performed by: NURSE PRACTITIONER

## 2020-09-07 PROCEDURE — 25000132 ZZH RX MED GY IP 250 OP 250 PS 637: Performed by: NURSE PRACTITIONER

## 2020-09-07 PROCEDURE — 99232 SBSQ HOSP IP/OBS MODERATE 35: CPT | Performed by: INTERNAL MEDICINE

## 2020-09-07 PROCEDURE — 80048 BASIC METABOLIC PNL TOTAL CA: CPT | Performed by: NURSE PRACTITIONER

## 2020-09-07 PROCEDURE — 80197 ASSAY OF TACROLIMUS: CPT | Performed by: NURSE PRACTITIONER

## 2020-09-07 PROCEDURE — 25000131 ZZH RX MED GY IP 250 OP 636 PS 637: Performed by: NURSE PRACTITIONER

## 2020-09-07 PROCEDURE — 21400000 ZZH R&B CCU UMMC

## 2020-09-07 PROCEDURE — 93010 ELECTROCARDIOGRAM REPORT: CPT | Performed by: INTERNAL MEDICINE

## 2020-09-07 PROCEDURE — 25000131 ZZH RX MED GY IP 250 OP 636 PS 637: Performed by: STUDENT IN AN ORGANIZED HEALTH CARE EDUCATION/TRAINING PROGRAM

## 2020-09-07 PROCEDURE — 00000146 ZZHCL STATISTIC GLUCOSE BY METER IP

## 2020-09-07 PROCEDURE — 85027 COMPLETE CBC AUTOMATED: CPT | Performed by: NURSE PRACTITIONER

## 2020-09-07 PROCEDURE — 25000128 H RX IP 250 OP 636: Performed by: NURSE PRACTITIONER

## 2020-09-07 PROCEDURE — 84484 ASSAY OF TROPONIN QUANT: CPT | Performed by: NURSE PRACTITIONER

## 2020-09-07 PROCEDURE — 93005 ELECTROCARDIOGRAM TRACING: CPT

## 2020-09-07 PROCEDURE — 71045 X-RAY EXAM CHEST 1 VIEW: CPT

## 2020-09-07 PROCEDURE — 36415 COLL VENOUS BLD VENIPUNCTURE: CPT | Performed by: NURSE PRACTITIONER

## 2020-09-07 PROCEDURE — 36415 COLL VENOUS BLD VENIPUNCTURE: CPT

## 2020-09-07 PROCEDURE — 87040 BLOOD CULTURE FOR BACTERIA: CPT

## 2020-09-07 RX ORDER — NALOXONE HYDROCHLORIDE 0.4 MG/ML
.1-.4 INJECTION, SOLUTION INTRAMUSCULAR; INTRAVENOUS; SUBCUTANEOUS
Status: DISCONTINUED | OUTPATIENT
Start: 2020-09-07 | End: 2020-09-24 | Stop reason: HOSPADM

## 2020-09-07 RX ORDER — TACROLIMUS 1 MG/1
1 CAPSULE ORAL
Status: DISCONTINUED | OUTPATIENT
Start: 2020-09-08 | End: 2020-09-08

## 2020-09-07 RX ORDER — OXYCODONE HYDROCHLORIDE 5 MG/1
5 TABLET ORAL EVERY 4 HOURS PRN
Status: DISCONTINUED | OUTPATIENT
Start: 2020-09-07 | End: 2020-09-24 | Stop reason: HOSPADM

## 2020-09-07 RX ADMIN — ASPIRIN 81 MG CHEWABLE TABLET 81 MG: 81 TABLET CHEWABLE at 09:13

## 2020-09-07 RX ADMIN — INSULIN ASPART 2 UNITS: 100 INJECTION, SOLUTION INTRAVENOUS; SUBCUTANEOUS at 11:27

## 2020-09-07 RX ADMIN — OXYCODONE HYDROCHLORIDE 5 MG: 5 TABLET ORAL at 05:13

## 2020-09-07 RX ADMIN — Medication 12.5 MG: at 14:26

## 2020-09-07 RX ADMIN — INSULIN GLARGINE 28 UNITS: 100 INJECTION, SOLUTION SUBCUTANEOUS at 22:01

## 2020-09-07 RX ADMIN — SODIUM CHLORIDE 500 ML: 9 INJECTION, SOLUTION INTRAVENOUS at 14:25

## 2020-09-07 RX ADMIN — Medication 1 TABLET: at 11:44

## 2020-09-07 RX ADMIN — ACETAMINOPHEN 975 MG: 325 TABLET, FILM COATED ORAL at 16:23

## 2020-09-07 RX ADMIN — CEFDINIR 300 MG: 300 CAPSULE ORAL at 20:20

## 2020-09-07 RX ADMIN — ACETAMINOPHEN 975 MG: 325 TABLET, FILM COATED ORAL at 23:10

## 2020-09-07 RX ADMIN — LIDOCAINE 1 PATCH: 560 PATCH PERCUTANEOUS; TOPICAL; TRANSDERMAL at 20:21

## 2020-09-07 RX ADMIN — MYCOPHENOLATE MOFETIL 1000 MG: 500 TABLET ORAL at 18:02

## 2020-09-07 RX ADMIN — TACROLIMUS 2 MG: 1 CAPSULE ORAL at 09:14

## 2020-09-07 RX ADMIN — Medication 12.5 MG: at 20:20

## 2020-09-07 RX ADMIN — ROSUVASTATIN CALCIUM 10 MG: 10 TABLET, FILM COATED ORAL at 22:00

## 2020-09-07 RX ADMIN — Medication 12.5 MG: at 09:13

## 2020-09-07 RX ADMIN — INSULIN ASPART: 100 INJECTION, SOLUTION INTRAVENOUS; SUBCUTANEOUS at 11:29

## 2020-09-07 RX ADMIN — Medication 1 TABLET: at 22:00

## 2020-09-07 RX ADMIN — VALGANCICLOVIR 450 MG: 450 TABLET, FILM COATED ORAL at 09:14

## 2020-09-07 RX ADMIN — MYCOPHENOLATE MOFETIL 1000 MG: 500 TABLET ORAL at 09:14

## 2020-09-07 RX ADMIN — POSACONAZOLE 300 MG: 100 TABLET, DELAYED RELEASE ORAL at 09:13

## 2020-09-07 RX ADMIN — CEFDINIR 300 MG: 300 CAPSULE ORAL at 09:13

## 2020-09-07 RX ADMIN — INSULIN ASPART 2 UNITS: 100 INJECTION, SOLUTION INTRAVENOUS; SUBCUTANEOUS at 16:41

## 2020-09-07 RX ADMIN — MAGNESIUM SULFATE IN WATER 2 G: 40 INJECTION, SOLUTION INTRAVENOUS at 09:20

## 2020-09-07 ASSESSMENT — ACTIVITIES OF DAILY LIVING (ADL)
ADLS_ACUITY_SCORE: 13

## 2020-09-07 ASSESSMENT — MIFFLIN-ST. JEOR: SCORE: 1513.47

## 2020-09-07 ASSESSMENT — PAIN DESCRIPTION - DESCRIPTORS
DESCRIPTORS: SORE
DESCRIPTORS: SHARP

## 2020-09-07 NOTE — PLAN OF CARE
Neuro: A&Ox4. States feels fuzzy after taking oxycodone previous shift, bed alarm on for safety  Cardiac: Tmax 99.3. HR ST,100s-120s, OVSS  Respiratory: RUL dim, on 1-2L NC for comfort, intermittent NP cough, RADER.   GI/: Voiding spontaneously, no loose stools this shift. Diet/appetite: Poor appetite, ate late breakfast of bland foods, declined subsequent meals. Denies nausea. 500 mL NS bolus given.   Activity: Up with SBA.   Pain: PRN tylenol given once this shift for pain to R upper chest, worse with coughing, inhaling, MDs notified.   Skin: Intact, some bruising.  Lines: PIV SL    Endobronchial US planned for tomorrow, NPO at midnight.

## 2020-09-07 NOTE — PROGRESS NOTES
[facility]   Cardiology II Service / Advanced Heart Failure  Daily Progress Note      Patient: Mariusz Sharp  MRN: [id number]  Admission Date: 8/31/2020  Hospital Day # 7    Assessment and Plan: Mariusz Sharp is a 59 year old male with history of CAD, LV thrombus, CKD Stage III, PAF, DM Type II, and ICM s/p HM III LVAD as BTT with subsequent OHT 5/18/20. He presented with progressive SOB, cough, and fever, found to have RUL mass.     Today's Plan:  - f/u Immuknow, blast, histo   - PJP PCP negative  - NPO past midnight for EBUS tomorrow  - subcutaneous heparin discontinued  - hold tacrolimus this evening, 1 mg BID starting tomorrow   - 500 ml NS bolus x1     #Fever with cough secondary to RUL mass. Tmax 102 prior to admission. History of recent rhinovirus. CDIF, LA, UA, COVID, respiratory viral panel Legionella, Cryptococcus, Strep pneumo negative, Aspergillus, CMV, and EBV all negative. Renal US notes resolution of prior stone. Chest X-ray negative. History of PAcnes to outflow graft and Donor positive S Anginosus and Veillonella (completed course of antibiotics). Concern for Chronic Prostatitis. Vanco/Zosyn discontinued 9/1. CT Chest positive for RUL mass concerning for PTLD vs infection. Note symptoms worse following cessation of antibiotics.   - Transplant ID following, appreciate recs.    - Beta D glucan 90 and trending upward   - started on posaconazole 9/4, 300 mg daily   - posaconazole level Wednesday (after 5 days)  - Blood cultures no growth to date   - Blastomyces, histoplasma PCR pending  - PJP: negative 9/6/20   - Aggressive pulmonary toileting and nebs   -  IR Pulmonary following, plan for EBUS Tuesday       - Send BAL for immunocompromised studies               -- tissue for histopathology, AFB, bacterial and fungal stains                -- tissue for bacterial, AFB and fungal cultures               -- if able to get additional tissue, please send fresh sample for fungal DNA 28S  rRNA  - Continue Cefdinir 300 mg BID through 10/1/20 for prostatitis      # Status post OHT on 5/18/20, history of NICM and LVAD  # Chronic immunosuppression  Echo 8/31 with EF 70% and normal graft function. mRA-3, RV-20/2, mPA-15, mPCWP-10, AMRIT CO-4.91, AMRIT CI-2.63 (weight 178 lb), biopsy negative.     Graft Function:   --Volume: euvolemic to hypovolemic, IVF today as below  --BP: /60-95 on hydralazine 12.5 mg tid  --HR: 110s-120s in sinus, baseline 100s-110s, tachycardia likely 2/2 infection     Immunosuppression:   --recently off prednisone per taper protocol  --Cellcept 1000 mg BID  --decrease tacrolimus to 2 mg in AM 1 mg in PM, trough 15.1 today (10 hr), goal 8-10, daily troughs for now  -Immuknow 9/3 pending    Serostatus: CMV: D+, R-, EBV: D- R+, Toxo D- R-    Prophylaxis:   --CAV: aspirin 81 mg and rosuvastatin 10 mg daily  --Thrush: on posaconazole  --PCP: Bactrim caused hyperK, treated with pentamidine, no longer indicated as off prednisone  --GI: Protonix   --Osteoporosis: calcium/vitamin D   --CMV: D+/R-, Valcyte renally dosed 450 mg daily x 6 mos (through November)    #ANDREW  #Hyponatremia likely hypovolemic  Cr 1.4 from 1.1 - suspect prerenal ANDREW +supratherpeutic tac level. Na 128 from 134, possibly due to hypovolemia.  - decrease tacrolimus as above  - repeat NS 500mL    #HTN. Prior to admission Norvasc on hold.  - hydralazine 12.5 mg TID (pta 25 mg TID)  - monitor     #Syncope, resolved. Occurred with cough prior to admission. MAPs stable.   - Aggressive pulmonary toileting and nebs.      #Diarrhea, improving.   Few loose stools daily, improving overall.   - CDIF negative and CMV negative.      #DM Type II.   - Lantus 24 units at HS.   - Novolog preprandial with medium intensity sliding scale insulin.      #Elevated PSA with Concern for Chronic Prostatitis. PSA-8.21. MRI consistent with BPH.   - Continue Cefdinir 300 mg BID through 10/1/20    FEN: diabetic diet  PROPHY:  subQ heparin HOLD  "PRIOR TO EBUS, PPI  LINES:  PIV  DISPO:  Pending EBUS  CODE STATUS:  Full code    Joseph Allen MD, Msc  Cardiovascular Disease Fellow  [facility]      Late entry - pt seen and examined on 9/7/2020  I have reviewed today's vital signs, notes, medications, labs and imaging. I have also seen and examined the patient and agree with the findings and plan as outlined above.    Jenni Ortiz MD  Section Head - Advanced Heart Failure, Transplantation and Mechanical Circulatory Support  Director - [facility]  Associate Professor of Medicine, [facility]    ================================================================    Subjective/24-Hr Events:   Last 24 hr care team notes reviewed. Overnight reported R sided chest pain, worse w/ coughing. Given oxycodne for pain. Patient reports feeling very somnolent and \"high\" from medication. Denies HA. Cough non productive and continues to improve. No fevers/chills, shortness of breath or lightheadedness.     ROS:  4 point ROS including respiratory, CV, GI and  (other than that noted in the HPI) is negative.     Medications: Reviewed in EPIC.     Physical Exam:   /74 (BP Location: Left arm)   Pulse 112   Temp 99.3  F (37.4  C) (Oral)   Resp 18   Ht 1.626 m (5' 4\")   Wt 77.7 kg (171 lb 6.4 oz)   SpO2 93%   BMI 29.42 kg/m      GENERAL: Appears comfortable, in no distress, fatigued.  HEENT: Eye symmetrical, no discharge or icterus bilaterally. Mucous membranes moist and without lesions.  NECK: Supple, JVD not visible at 45 degrees.   CV: Tachycardic and regular, +S1S2, no murmur, rub, or gallop. Reproducible R sided chest tenderness to palpation.   RESPIRATORY: Respirations regular, even, and unlabored. Lungs CTA throughout. No crackles or wheezes.  GI: Soft, obese and non distended with normoactive bowel sounds present in all quadrants. No tenderness, rebound, guarding.   EXTREMITIES: " No peripheral edema. 2+ bilateral pedal pulses.   NEUROLOGIC: Alert and oriented x 3. No focal deficits.   MUSCULOSKELETAL: No joint swelling or tenderness.   SKIN: No jaundice. No rashes or lesions.     Labs:  CMP  Recent Labs   Lab 09/07/20  0523 09/06/20  0429 09/05/20  1403 09/05/20  0549 09/04/20  0543 09/03/20  0526 09/02/20  0551   * 128* 128* 128* 134 136 137   POTASSIUM 4.1 4.2 5.3 4.0 3.9 4.2 4.1   CHLORIDE 98 98 99 99 104 106 107   CO2 21 22 22 20 22 23 22   ANIONGAP 9 7 6 8 8 7 7   * 234* 186* 206* 115* 191* 126*   BUN 25 23 21 22 17 23 24   CR 1.44* 1.35* 1.38* 1.41* 1.16 1.13 1.32*   GFRESTIMATED 53* 58* 56* 55* 69 71 59*   GFRESTBLACK 62 67 65 63 80 83 69   ARLENE 8.9 8.6 9.0 8.6 9.0 8.6 8.5   MAG 1.8 1.7  --  1.7 1.7 1.6 1.9   PROTTOTAL  --   --   --  6.6* 6.5* 6.4* 6.0*   ALBUMIN  --   --   --  2.5* 2.6* 2.5* 2.3*   BILITOTAL  --   --   --  0.8 0.8 0.4 0.5   ALKPHOS  --   --   --  68 66 66 58   AST  --   --   --  22 21 19 19   ALT  --   --   --  15 12 16 14       CBC  Recent Labs   Lab 09/07/20  0523 09/06/20  0429 09/05/20  0549 09/04/20  0543   WBC 6.8 5.9 5.3 3.8*   RBC 2.95* 3.07* 2.99* 3.15*   HGB 8.2* 8.4* 8.3* 8.7*   HCT 26.2* 26.6* 26.5* 27.5*   MCV 89 87 89 87   MCH 27.8 27.4 27.8 27.6   MCHC 31.3* 31.6 31.3* 31.6   RDW 13.7 13.9 13.9 14.2    322 353 376         Intake/Output Summary (Last 24 hours) at 9/7/2020 1121  Last data filed at 9/7/2020 0240  Gross per 24 hour   Intake 650 ml   Output 875 ml   Net -225 ml

## 2020-09-07 NOTE — PLAN OF CARE
D: Patient admitted 8/31w/ progressive shortness of breath, cough, and fever. Hx. HTN, CAD, CDK3, PAF, DM2, ICM s/p LVAD HM3 w/ subsequent OHT 5/18/20.  I/A: A&Ox4. VSS on RA. Mild RADER. Afebrile. ST 110s-120s. C/o chest pain overnight partially relieve by prn oxycodone, see provider notification note.  overnight. Adequate uop. Continues to have loose stools. Up ad domingo. Appeared to rest comfortably overnight.   P: Continue to monitor and notify Cards 2 with questions/concerns.

## 2020-09-07 NOTE — PROVIDER NOTIFICATION
"Pt c/o new onset R-sided chest pain, \"sharp\", that is worse when he exhales. Denies pain radiation. VS remain stable/unchanged. While assessing patient describes that pain is getting worse and constant.     Stat EKG ordered and Cards CC paged. New orders for trop and CXR. MD at bedside to assess patient.         "

## 2020-09-07 NOTE — PLAN OF CARE
Patient admitted with history of LVAD and OHT 5/2020 admitted with cough, fever, SOB. RUL mass found. Endobronchial US planned for Tuesday.    Neuro: A&Ox4.   Cardiac: Afebrile, VSS except HR consistently 100s-120s.   Respiratory: Alternates RA to 1-2L.  GI/: Voiding spontaneously. Loose BMs this shift, one episode of incontinence.  Diet/appetite: Poor appetite, had fruit and boost for dinner. Denies nausea   Activity: Up with SBA. Ambulated in hallway once this shift.   Pain: . PRN tylenol given once this shift for generalized pain. Lido patch placed to upper right shoulder at 2000.  Skin: Intact, some bruising.  Lines: PIV SL

## 2020-09-08 LAB
ANION GAP SERPL CALCULATED.3IONS-SCNC: 9 MMOL/L (ref 3–14)
APPEARANCE FLD: CLEAR
ASPERGILLUS AB SER QL ID: NORMAL
B DERMAT AB SER QL ID: NORMAL
BUN SERPL-MCNC: 22 MG/DL (ref 7–30)
CALCIUM SERPL-MCNC: 9.1 MG/DL (ref 8.5–10.1)
CHLORIDE SERPL-SCNC: 99 MMOL/L (ref 94–109)
CO2 SERPL-SCNC: 22 MMOL/L (ref 20–32)
COCCIDIOIDES AB SPEC QL ID: NORMAL
COLOR FLD: COLORLESS
COPATH REPORT: NORMAL
CREAT SERPL-MCNC: 1.39 MG/DL (ref 0.66–1.25)
ERYTHROCYTE [DISTWIDTH] IN BLOOD BY AUTOMATED COUNT: 13.8 % (ref 10–15)
GFR SERPL CREATININE-BSD FRML MDRD: 56 ML/MIN/{1.73_M2}
GLUCOSE SERPL-MCNC: 171 MG/DL (ref 70–99)
GRAM STN SPEC: NORMAL
H CAPSUL AB TITR SER ID: NORMAL {TITER}
HCT VFR BLD AUTO: 26.6 % (ref 40–53)
HGB BLD-MCNC: 8.4 G/DL (ref 13.3–17.7)
IMMUKNOW IMMUNE CELL FUNCTION: 33 NG/ML
INTERPRETATION ECG - MUSE: NORMAL
LAB SCANNED RESULT: NORMAL
LYMPHOCYTES NFR FLD MANUAL: 1 %
MAGNESIUM SERPL-MCNC: 1.8 MG/DL (ref 1.6–2.3)
MCH RBC QN AUTO: 27.2 PG (ref 26.5–33)
MCHC RBC AUTO-ENTMCNC: 31.6 G/DL (ref 31.5–36.5)
MCV RBC AUTO: 86 FL (ref 78–100)
NEUTS BAND NFR FLD MANUAL: 10 %
OTHER CELLS FLD MANUAL: 89 %
PLATELET # BLD AUTO: 361 10E9/L (ref 150–450)
POTASSIUM SERPL-SCNC: 4 MMOL/L (ref 3.4–5.3)
RBC # BLD AUTO: 3.09 10E12/L (ref 4.4–5.9)
SODIUM SERPL-SCNC: 130 MMOL/L (ref 133–144)
SPECIMEN SOURCE FLD: NORMAL
SPECIMEN SOURCE: NORMAL
TACROLIMUS BLD-MCNC: 15 UG/L (ref 5–15)
TME LAST DOSE: NORMAL H
WBC # BLD AUTO: 7.1 10E9/L (ref 4–11)
WBC # FLD AUTO: 116 /UL

## 2020-09-08 PROCEDURE — G0500 MOD SEDAT ENDO SERVICE >5YRS: HCPCS | Performed by: INTERNAL MEDICINE

## 2020-09-08 PROCEDURE — 25000128 H RX IP 250 OP 636: Performed by: INTERNAL MEDICINE

## 2020-09-08 PROCEDURE — 25800030 ZZH RX IP 258 OP 636: Performed by: INTERNAL MEDICINE

## 2020-09-08 PROCEDURE — 80048 BASIC METABOLIC PNL TOTAL CA: CPT | Performed by: NURSE PRACTITIONER

## 2020-09-08 PROCEDURE — 31654 BRONCH EBUS IVNTJ PERPH LES: CPT | Performed by: INTERNAL MEDICINE

## 2020-09-08 PROCEDURE — 87798 DETECT AGENT NOS DNA AMP: CPT | Performed by: NURSE PRACTITIONER

## 2020-09-08 PROCEDURE — 25000125 ZZHC RX 250: Performed by: INTERNAL MEDICINE

## 2020-09-08 PROCEDURE — 25000132 ZZH RX MED GY IP 250 OP 250 PS 637: Performed by: STUDENT IN AN ORGANIZED HEALTH CARE EDUCATION/TRAINING PROGRAM

## 2020-09-08 PROCEDURE — 07D78ZX EXTRACTION OF THORAX LYMPHATIC, VIA NATURAL OR ARTIFICIAL OPENING ENDOSCOPIC, DIAGNOSTIC: ICD-10-PCS | Performed by: INTERNAL MEDICINE

## 2020-09-08 PROCEDURE — 0BB48ZX EXCISION OF RIGHT UPPER LOBE BRONCHUS, VIA NATURAL OR ARTIFICIAL OPENING ENDOSCOPIC, DIAGNOSTIC: ICD-10-PCS | Performed by: INTERNAL MEDICINE

## 2020-09-08 PROCEDURE — 88108 CYTOPATH CONCENTRATE TECH: CPT | Performed by: INTERNAL MEDICINE

## 2020-09-08 PROCEDURE — 00000467 ZZHCL STATISTIC CYTO FNA IM TC STAT 88172: Performed by: INTERNAL MEDICINE

## 2020-09-08 PROCEDURE — 25800030 ZZH RX IP 258 OP 636: Performed by: NURSE PRACTITIONER

## 2020-09-08 PROCEDURE — 25000132 ZZH RX MED GY IP 250 OP 250 PS 637: Performed by: INTERNAL MEDICINE

## 2020-09-08 PROCEDURE — 80197 ASSAY OF TACROLIMUS: CPT | Performed by: NURSE PRACTITIONER

## 2020-09-08 PROCEDURE — 88312 SPECIAL STAINS GROUP 1: CPT | Performed by: INTERNAL MEDICINE

## 2020-09-08 PROCEDURE — 21400000 ZZH R&B CCU UMMC

## 2020-09-08 PROCEDURE — 88173 CYTOPATH EVAL FNA REPORT: CPT | Performed by: INTERNAL MEDICINE

## 2020-09-08 PROCEDURE — 83735 ASSAY OF MAGNESIUM: CPT | Performed by: NURSE PRACTITIONER

## 2020-09-08 PROCEDURE — 87102 FUNGUS ISOLATION CULTURE: CPT | Performed by: INTERNAL MEDICINE

## 2020-09-08 PROCEDURE — 25000128 H RX IP 250 OP 636: Performed by: NURSE PRACTITIONER

## 2020-09-08 PROCEDURE — 87206 SMEAR FLUORESCENT/ACID STAI: CPT | Performed by: INTERNAL MEDICINE

## 2020-09-08 PROCEDURE — 25000131 ZZH RX MED GY IP 250 OP 636 PS 637: Performed by: NURSE PRACTITIONER

## 2020-09-08 PROCEDURE — 25000131 ZZH RX MED GY IP 250 OP 636 PS 637: Performed by: INTERNAL MEDICINE

## 2020-09-08 PROCEDURE — 00000155 ZZHCL STATISTIC H-CELL BLOCK W/STAIN: Performed by: INTERNAL MEDICINE

## 2020-09-08 PROCEDURE — 25000131 ZZH RX MED GY IP 250 OP 636 PS 637: Performed by: STUDENT IN AN ORGANIZED HEALTH CARE EDUCATION/TRAINING PROGRAM

## 2020-09-08 PROCEDURE — 99152 MOD SED SAME PHYS/QHP 5/>YRS: CPT | Performed by: INTERNAL MEDICINE

## 2020-09-08 PROCEDURE — 87015 SPECIMEN INFECT AGNT CONCNTJ: CPT | Performed by: INTERNAL MEDICINE

## 2020-09-08 PROCEDURE — 87070 CULTURE OTHR SPECIMN AEROBIC: CPT | Performed by: INTERNAL MEDICINE

## 2020-09-08 PROCEDURE — 25000132 ZZH RX MED GY IP 250 OP 250 PS 637: Performed by: NURSE PRACTITIONER

## 2020-09-08 PROCEDURE — 99153 MOD SED SAME PHYS/QHP EA: CPT | Performed by: INTERNAL MEDICINE

## 2020-09-08 PROCEDURE — 88305 TISSUE EXAM BY PATHOLOGIST: CPT | Performed by: INTERNAL MEDICINE

## 2020-09-08 PROCEDURE — 36415 COLL VENOUS BLD VENIPUNCTURE: CPT | Performed by: NURSE PRACTITIONER

## 2020-09-08 PROCEDURE — 87205 SMEAR GRAM STAIN: CPT | Performed by: INTERNAL MEDICINE

## 2020-09-08 PROCEDURE — 87116 MYCOBACTERIA CULTURE: CPT | Performed by: INTERNAL MEDICINE

## 2020-09-08 PROCEDURE — 85027 COMPLETE CBC AUTOMATED: CPT | Performed by: NURSE PRACTITIONER

## 2020-09-08 PROCEDURE — 89051 BODY FLUID CELL COUNT: CPT | Performed by: INTERNAL MEDICINE

## 2020-09-08 PROCEDURE — 87801 DETECT AGNT MULT DNA AMPLI: CPT | Performed by: INTERNAL MEDICINE

## 2020-09-08 PROCEDURE — 99232 SBSQ HOSP IP/OBS MODERATE 35: CPT | Performed by: NURSE PRACTITIONER

## 2020-09-08 PROCEDURE — 0B9C8ZX DRAINAGE OF RIGHT UPPER LUNG LOBE, VIA NATURAL OR ARTIFICIAL OPENING ENDOSCOPIC, DIAGNOSTIC: ICD-10-PCS | Performed by: INTERNAL MEDICINE

## 2020-09-08 RX ORDER — FENTANYL CITRATE 50 UG/ML
INJECTION, SOLUTION INTRAMUSCULAR; INTRAVENOUS PRN
Status: DISCONTINUED | OUTPATIENT
Start: 2020-09-08 | End: 2020-09-08 | Stop reason: HOSPADM

## 2020-09-08 RX ORDER — LIDOCAINE HYDROCHLORIDE 40 MG/ML
INJECTION, SOLUTION RETROBULBAR PRN
Status: DISCONTINUED | OUTPATIENT
Start: 2020-09-08 | End: 2020-09-08 | Stop reason: HOSPADM

## 2020-09-08 RX ORDER — MYCOPHENOLATE MOFETIL 500 MG/1
500 TABLET ORAL
Status: DISCONTINUED | OUTPATIENT
Start: 2020-09-08 | End: 2020-09-24 | Stop reason: HOSPADM

## 2020-09-08 RX ORDER — TACROLIMUS 0.5 MG/1
0.5 CAPSULE ORAL
Status: DISCONTINUED | OUTPATIENT
Start: 2020-09-09 | End: 2020-09-10

## 2020-09-08 RX ORDER — MAGNESIUM HYDROXIDE 1200 MG/15ML
LIQUID ORAL PRN
Status: DISCONTINUED | OUTPATIENT
Start: 2020-09-08 | End: 2020-09-08 | Stop reason: HOSPADM

## 2020-09-08 RX ADMIN — POSACONAZOLE 300 MG: 100 TABLET, DELAYED RELEASE ORAL at 08:41

## 2020-09-08 RX ADMIN — Medication 12.5 MG: at 20:02

## 2020-09-08 RX ADMIN — Medication 12.5 MG: at 08:41

## 2020-09-08 RX ADMIN — ASPIRIN 81 MG CHEWABLE TABLET 81 MG: 81 TABLET CHEWABLE at 08:42

## 2020-09-08 RX ADMIN — ACETAMINOPHEN 975 MG: 325 TABLET, FILM COATED ORAL at 04:39

## 2020-09-08 RX ADMIN — POTASSIUM CHLORIDE 20 MEQ: 750 TABLET, EXTENDED RELEASE ORAL at 08:40

## 2020-09-08 RX ADMIN — TACROLIMUS 1 MG: 1 CAPSULE ORAL at 08:42

## 2020-09-08 RX ADMIN — CEFDINIR 300 MG: 300 CAPSULE ORAL at 20:02

## 2020-09-08 RX ADMIN — ROSUVASTATIN CALCIUM 10 MG: 10 TABLET, FILM COATED ORAL at 23:36

## 2020-09-08 RX ADMIN — MAGNESIUM SULFATE IN WATER 2 G: 40 INJECTION, SOLUTION INTRAVENOUS at 08:40

## 2020-09-08 RX ADMIN — VALGANCICLOVIR 450 MG: 450 TABLET, FILM COATED ORAL at 08:41

## 2020-09-08 RX ADMIN — CEFDINIR 300 MG: 300 CAPSULE ORAL at 08:41

## 2020-09-08 RX ADMIN — MYCOPHENOLATE MOFETIL 500 MG: 500 TABLET ORAL at 18:15

## 2020-09-08 RX ADMIN — Medication 1 TABLET: at 23:36

## 2020-09-08 RX ADMIN — SODIUM CHLORIDE 500 ML: 9 INJECTION, SOLUTION INTRAVENOUS at 15:36

## 2020-09-08 RX ADMIN — MYCOPHENOLATE MOFETIL 1000 MG: 500 TABLET ORAL at 08:42

## 2020-09-08 RX ADMIN — INSULIN GLARGINE 28 UNITS: 100 INJECTION, SOLUTION SUBCUTANEOUS at 23:34

## 2020-09-08 RX ADMIN — ACETAMINOPHEN 975 MG: 325 TABLET, FILM COATED ORAL at 15:34

## 2020-09-08 ASSESSMENT — MIFFLIN-ST. JEOR: SCORE: 1525.26

## 2020-09-08 ASSESSMENT — ACTIVITIES OF DAILY LIVING (ADL)
ADLS_ACUITY_SCORE: 13

## 2020-09-08 NOTE — PROVIDER NOTIFICATION
Notified MD [id number] of pt with elevated temp of 100.2 ax this evening and had tylenol earlier.Nothing now but will continue to monitor and let him know of any changes.

## 2020-09-08 NOTE — PLAN OF CARE
"/66   Pulse 114   Temp 100.7  F (38.2  C) (Axillary)   Resp 20   Ht 1.626 m (5' 4\")   Wt 77.7 kg (171 lb 6.4 oz)   SpO2 93%   BMI 29.42 kg/m   RA.Pt ha temp this eveening and MD notified x about temp and blood cultures were ordered and done.MD also asked about evening Lantus if full dose should be given and said he should get full even if NPO at midnight.Pt also c/o pain in head and right side of face. Wanted ice bag on as he has had this at home to.Denies any changes in vison or no N&T noted.Will continue to monitor.  "

## 2020-09-08 NOTE — PROGRESS NOTES
[facility] INFECTIOUS DISEASES PROGRESS NOTE     Patient:  Mariusz Sharp   YOB: 1960  Date of Visit:  09/08/2020  Date of Admission: 8/31/2020  Consult Requester:Nasreen Nielson, *          Assessment and Recommendations:   Recommendations:  - Do not broaden abx unless patient becomes septic. Continue to await infectious work-up results.   - Continue cefdinir to treat previously diagnosed prostatitis    - Continue PO Posaconazole 300 mg daily                -- Posaconazole level ordered for tomorrow 9/9 AM, goal is >1.0   - EBUS completed on Tuesday, 9/8. Many studies pending as below  - Pending non-invasive fungal work up: fungal antibody panel    Thank you for the consult. Transplant ID will continue to follow with you.     Joavna Ragsdale PA-C   Infectious Diseases  Pager: [phone]    Discussion:  Mariusz Sharp is a 59 year old male with PMHx of ICM s/p LVAD and subsequent OHT 5/18/2020 who was admitted on 8/31 with fever and cough. CT revealed R hilar opacity concerning for infection vs possible malignancy.    # RUL and right hilum mass vs consolidation c/f infection vs malignancy.   # Low positive fungitell at 90, prior 85 on 8/19  Patient presented with fevers of 101.5 at home, worsening persistent cough associated with syncope and progressive dyspnea and fatigue, although remains on room air. CXR was clear but due to symptoms, we ordered a CT chest which shows RUL and right hilum mass vs consolidations with narrowing of multiple RUL bronchi c/f either an infectious process vs malignancy including PTLD. He has had a low positive fungitell on 8/19 and 8/31--unclear significance. However, his symptoms and CT chest findings are somewhat c/f for atypical infections, especially fungal etiologies (as well as AFB and other atypical bacteria). As such he was started on anti-fungal coverage pending work up. Malignancy/ PTLD also possible but EBV PCR in serum is negative. Pulm  has been consulted and plan EBUS +biopsy on Tuesday.     Other infectious work up negative thus far including COVID-19 testing, serum aspergillus Ag, serum CMV and EBV PCRs, Urine strep and legionella and blood cx. Unable able to collect adequate sputum sample for bacterial Cx, but PCP PCR negative.     EBUS completed 9/8 with work-up as follows: fungal DNA 28S, fungus culture, AFB stain, AFB culture, Cytology, aerobic culture, nocardia culture, gram stain, cell count and differential, surgical pathology, FNA    # Prostatitis  Found during 8/18-8/21 admission. Imaging showed enlarged prostate-this with elevated prostate and urine sediment was c/f chronic prostatitis/UTI. He was treated with ceftriaxone and azithromycin and discharged on cefdinir for 6 weeks (end 10/1).    # Mild diarrhea  Stable during admission, 2-4 occurences recorded per day. Continue to monitor.    # ICM s/p LVAD  And subsequent OHT 5/18/20  - CMV D+/R-, EBV D-/R+, HSV1?/2?, VZV +, Toxo D-/R-  - Maintenance immunosuppression-cellcept, tacrolimus, pred    # CKD - stable  # DMII - stable       Prior ID Issues:  - Human rhinovirus/enterovirus: Hospitalized 8/18/20-8/21/20 with CAP (fevers/cough/dyspnea, but on room air). CXR showed retrocardiac opacity. COVID-19 was negative. ID was consulted. Infectious work up was positive for human rhinovirus/enterovirus.  - Donor Blood Cx w/ S anginosus and Veillonella sp in 1/2 sets on 5/16/20 (suspected contaminant)- s/p 7 days antibiotics    Other ID issues:  - QTc interval:  465 msec 9/7   - Bacterial prophylaxis: currently on cefdinir due to h/o prostatitis   - Pneumocystis prophylaxis:  none, dc'd as off prednisone. Prior Inhaled pentamidine-last dose 7/29/2020. Bactrim was discontinued 5/27/2020 2/2 hyperkalemia  - Viral serostatus:  CMV D+/R-, EBV D-/R+, HSV1?/2?, VZV +, Toxo D-/R-  - Viral ppx: Valcyte  - Fungal prophylaxis:  Posaconazole related to RUL opacity  - Immunization status:    - Isolation  status: good hand hygiene           Interval History:   T max 100.7, first fever of admission. WBC remains normal. Red is seen after BAL. Reports he is still tired. Has had some sips of water, but hasn't eaten since coming back. Lack of appetite, no n/v, abdominal pain. Some increased cough and wheezing noted today.            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 Induction with and maintenance immunosuppression with cellcept, tacrolimus and prednisone 5 mg. Antimicrobial prophylaxis includes valganciclovir and previously with bactrim (dc'd 5/27 due to hyperkalemia-switched to pentamidine)     His PMHx is also significant for LV thrombus, CKD, and DMII     Briefly to review relevant medical history,      --Hospitalized 8/18/20-8/21/20 with CAP (fevers/cough/dyspnea, but on room air).CXR showed retrocardiac opacity. COVID-19 was negative. ID was consulted. Infectious work up was positive for human rhinovirus/enterovirus. Imaging showed enlarged prostate-this with elevated prostate and urine sediment was c/f chronic prostatitis/UTI. He was treated with ceftriaxone and azithromycin and discharged on cefdinir for 6 weeks     Patient was doing well at home until last night. He states he developed a persistent hacking dry cough spell, associated with weakness and syncope/fall hitting his forehead. He also endorses high grade fevers of 101.3, associated right otalgia and sore throat. Also endorsing diarrhea ~3 days. He does endorse progressive dyspnea since his transplant but no orthopnea/LE edema.     On admission, patient was afebrile, HDS on room air. Initial labs with wbc 4.3, procalcitonin 0.23, lactate 1.5, Cr 1.6, normal LFTs. Blood  cx were drawn and patient received vanc/zosyn. CXR was unremarkable. CT head negative and renal US with no acute pathology. UA unremarkable         Review of Systems:   Targeted 5 point review of systems was negative except for noted as above the interval history section.            Current Medications:       aspirin  81 mg Oral Daily     calcium carbonate 600 mg-vitamin D 400 units  1 tablet Oral BID w/meals     cefdinir  300 mg Oral Q12H CHANCE     hydrALAZINE  12.5 mg Oral TID     insulin aspart  1-7 Units Subcutaneous TID AC     insulin aspart  1-5 Units Subcutaneous At Bedtime     insulin aspart   Subcutaneous QAM AC     insulin aspart   Subcutaneous Daily with lunch     insulin aspart   Subcutaneous Daily with supper     insulin glargine  28 Units Subcutaneous At Bedtime     lidocaine  1 patch Transdermal Q24h    And     lidocaine   Transdermal Q8H     mycophenolate  1,000 mg Oral BID IS     posaconazole  300 mg Oral Daily     rosuvastatin  10 mg Oral At Bedtime     sodium chloride (PF)  3 mL Intracatheter Q8H     tacrolimus  1 mg Oral BID IS     valGANciclovir  450 mg Oral Daily              Physical Exam:   Vitals were reviewed  Temp:  [98.2  F (36.8  C)-100.7  F (38.2  C)] 98.3  F (36.8  C)  Pulse:  [106-117] 107  Resp:  [18-35] 35  BP: ()/(65-94) 128/93  SpO2:  [93 %-98 %] 96 %    Vitals:    09/02/20 0657 09/04/20 0548 09/05/20 0500 09/07/20 0239   Weight: 80.1 kg (176 lb 9.6 oz) 79.2 kg (174 lb 8 oz) 80 kg (176 lb 4.8 oz) 77.7 kg (171 lb 6.4 oz)    09/08/20 0054   Weight: 78.9 kg (174 lb)       Constitutional: Adult male seen sleeping in bed, wakes to voice, in NAD.   HEENT: NC/AT, EOMI, sclera clear, conjunctiva normal, OP with MMM  Respiratory: No increased work of breathing, expiratory wheezing throughout but R>L  Cardiovascular: RRR, no murmur noted. No peripheral edema.  GI: non-distended.  Skin: Warm, dry, well-perfused.   Neurologic: A&O. Answers questions appropriately, speech normal.    Neuropsychiatric: Calm. Affect appropriate to situation.           Laboratory Data:   Microbiology:  20 Blood Cx NGTD  20 serum cryptococcal Ag - negative  20 urine histoplasma Ag - negative  20 urine and serum blastomyces Ag - negative  20 Sputum PCP PCR negative  20 Urine legionella Ag negative  20 Serum CMV PCR negative  20 Serum EBV PCR negative  20 Serum cryptococcal Ag negative  20 Urine strep Ag negative  20 Serum Fungitell 90  20 Serum Aspergillus A.05  20 COVID-19 negative  20 Resp Viral panel negative  20 Blood Cx: NGTD     Prior Micro:  20 Urine Cx: NG  20 Sputum Cx: Normal Mary  20 Urine Cx: NG  20 Serum Fungitell 86  20 Resp viral panel + Human Rhinovirus/Enterovirus  20 Blood Cx: NG  20 Blood Cx: NG      Metabolic Studies       Recent Labs   Lab Test 20  0548 20  0523 20  0429  20  0658  20  0939  20  0558  06/15/20  0826   * 127* 128*   < > 134   < > 137   < > 139   < > 139   POTASSIUM 4.0 4.1 4.2   < > 3.8   < > 4.1   < > 3.6   < > 4.8   CHLORIDE 99 98 98   < > 104   < > 105   < > 107   < > 110*   CO2 22 21 22   < > 22   < > 25   < > 25   < > 21   ANIONGAP 9 9 7   < > 9   < > 7   < > 6   < > 8   BUN 22 25 23   < > 21   < > 41*   < > 27   < > 98*   CR 1.39* 1.44* 1.35*   < > 1.38*   < > 1.91*   < > 1.31*   < > 1.89*   GFRESTIMATED 56* 53* 58*   < > 56*   < > 38*   < > 60*   < > 38*   * 266* 234*   < > 118*   < > 164*   < > 69*   < > 120*   A1C  --   --   --   --   --   --   --   --  7.4*   < > 7.8*   ARLENE 9.1 8.9 8.6   < > 8.5   < > 9.3   < > 8.4*   < > 9.0   PHOS  --   --   --   --   --   --  3.0  --   --    < > 4.4   MAG 1.8 1.8 1.7   < > 1.4*  --  1.7  --   --    < > 1.7   LACT  --   --   --   --  0.9   < >  --   --   --    < >  --    CKT  --   --   --   --   --   --   --   --   --   --  132    < > = values in this interval not displayed.        Hepatic Studies    Recent Labs   Lab Test 09/05/20  0549  09/01/20  0658   BILITOTAL 0.8   < > 0.9   ALKPHOS 68   < > 62   ALBUMIN 2.5*   < > 2.4*   AST 22   < > 16   ALT 15   < > 11   LDH  --   --  228*    < > = values in this interval not displayed.       Pancreatitis testing    Recent Labs   Lab Test 09/01/20  0658  05/18/20  0857   AMYLASE  --   --  56   TRIG 143   < >  --     < > = values in this interval not displayed.       Hematology Studies      Recent Labs   Lab Test 09/08/20  0548 09/07/20  0523 09/06/20  0429 09/05/20  0549 09/04/20  0543 09/03/20  0526   WBC 7.1 6.8 5.9 5.3 3.8* 3.1*   ANEU  --   --   --  4.1 3.1 2.4   ALYM  --   --   --  0.8 0.3* 0.3*   MARTHA  --   --   --  0.3 0.2 0.2   AEOS  --   --   --  0.0 0.2 0.1   HGB 8.4* 8.2* 8.4* 8.3* 8.7* 8.3*   HCT 26.6* 26.2* 26.6* 26.5* 27.5* 27.0*    341 322 353 376 333       Urine Studies     Recent Labs   Lab Test 08/31/20  1615   URINEPH 5.5   NITRITE Negative   LEUKEST Negative   WBCU 1       Vancomycin Levels     Recent Labs   Lab Test 05/27/20  0837 05/24/20  1726 05/22/20  1704   VANCOMYCIN 26.6* 11.9 12.5       Hepatitis B Testing   Recent Labs   Lab Test 05/15/20  0559   HBCAB Nonreactive   HEPBANG Nonreactive       Hepatitis C Testing     Hepatitis C Antibody   Date Value Ref Range Status   05/15/2020 Nonreactive NR^Nonreactive Final     Comment:     Assay performance characteristics have not been established for newborns,   infants, and children     12/04/2019 Nonreactive NR^Nonreactive Final     Comment:     Assay performance characteristics have not been established for newborns,   infants, and children         Last check of C difficile  C Diff Toxin B PCR   Date Value Ref Range Status   09/01/2020 Negative NEG^Negative Final     Comment:     Negative: C. difficile target DNA sequences NOT detected, presumed negative   for C.difficile toxin B or the number of bacteria present may be below the   limit of detection for the  test.  FDA approved assay performed using [employer] GeneXpert real-time PCR.  A negative result does not exclude actual disease due to C. difficile and may   be due to improper collection, handling and storage of the specimen or the   number of organisms in the specimen is below the detection limit of the assay.              Imaging:   Exam: XR CHEST PORT 1 VW, 9/7/2020 5:30 AM                                       Impression:   1. No acute cardiopulmonary findings.  2. Less prominent right superior hilar opacity.    EXAMINATION: CT CHEST W/O CONTRAST, 9/2/2020 4:23 PM                                       IMPRESSION:      1. New mass vs consolidation in the right upper lobe and right hilum  since 7/29/2020 with narrowing of multiple right upper lobe bronchi.   1a. Differential pneumonia with reactive right hilar lymphadenopathy  vs. malignancy(ex. Lymphoma/PTLD) with post obstructive pneumonia.     2. Stable additional pulmonary lesions as above.     3. Stable postsurgical changes of heart transplant.     4. Cholelithiasis without evidence of cholecystitis.

## 2020-09-08 NOTE — PROGRESS NOTES
"CLINICAL NUTRITION SERVICES - ASSESSMENT NOTE     Nutrition Prescription    RECOMMENDATIONS FOR MDs/PROVIDERS TO ORDER:  Encourage PO and intake of Boost when appetite is poor.    Malnutrition Status:    Unable to determine due to unable to complete all parameters of nutrition assessment at this time.    Recommendations already ordered by Registered Dietitian (RD):  Strawberry Boost GC TID with meals and PRN per pt.    Future/Additional Recommendations:  1. Monitor PO adequacy and tolerance of supplements  2. Monitor need for kcal cts     REASON FOR ASSESSMENT  Mariusz Sharp is a/an 59 year old male assessed by the dietitian for LOS    NUTRITION HISTORY  Per chart review, last seen by Clinical Nutrition on 9/1/20 for low sodium diet education. Spoke with pt over the phone - he was very sleepy so kept visit short. Interested in trying strawberry Boost.    CURRENT NUTRITION ORDERS  Diet: Moderate Consistent Carbohydrate  Intake/Tolerance: Having poor appetite per chart. Had been eating well (% meals per RN flowsheets) up until 9/6.    LABS  Labs reviewed  -Na+ 130 (L)  -K+ 4.0 (WNL)  -Cr 1.39 (H)  -BG (last 3): 171>155>167  -Hgb A1C 7.4%    MEDICATIONS  Medications reviewed  -Calcium carbonate with D  -Insulin  -CellCept  -Tacro    ANTHROPOMETRICS  Height: 162.6 cm (5' 4\")  Most Recent Weight: 78.9 kg (174 lb)    IBW: 59.1 kg (131% IBW)  BMI: 29.4 kg/m2; Overweight BMI 25-29.9  Weight History: ~3.9% wt loss over the past 3 months (not clinically severe).  Wt Readings from Last 20 Encounters:   09/08/20 78.9 kg (174 lb)   08/26/20 82.6 kg (182 lb)   08/26/20 81 kg (178 lb 9.6 oz)   08/21/20 80.5 kg (177 lb 6.4 oz)   07/29/20 80.7 kg (178 lb)   06/30/20 83.6 kg (184 lb 3.2 oz)   06/15/20 83.7 kg (184 lb 9.6 oz)   06/08/20 82.1 kg (181 lb)   06/01/20 84.8 kg (187 lb)   05/29/20 87.4 kg (192 lb 9.6 oz)   05/15/20 86.5 kg (190 lb 11.2 oz)   04/29/20 88.9 kg (196 lb)   12/04/19 91.3 kg (201 lb 4.8 oz)   09/04/19 94.2 " kg (207 lb 11.2 oz)   08/07/19 94.8 kg (209 lb)   07/08/19 88 kg (194 lb 0.1 oz)   06/26/19 93.9 kg (207 lb 1.6 oz)   05/03/19 86.2 kg (190 lb)   05/01/19 91.2 kg (201 lb)   03/27/19 88.9 kg (196 lb)     Wts this admit  09/08/20 0054  78.9 kg (174 lb)    09/07/20 0239  77.7 kg (171 lb 6.4 oz) -- lowest/driest wt this admit   09/05/20 0500  80 kg (176 lb 4.8 oz)    09/04/20 0548  79.2 kg (174 lb 8 oz)    09/02/20 0657  80.1 kg (176 lb 9.6 oz)    08/31/20 2258  79.9 kg (176 lb 3.2 oz)    08/31/20 1225  82 kg (180 lb 11.2 oz)      Dosing Weight: 64 kg (adjusted, based on driest/lowest wt this admit of 77.7 kg on 9/7 and IBW of 59.1 kg)    ASSESSED NUTRITION NEEDS  Estimated Energy Needs: 5125-4450 kcals/day (25 - 30 kcals/kg)  Justification: Maintenance  Estimated Protein Needs: 64-77 grams protein/day (1 - 1.2 grams of pro/kg)  Justification: Maintenance  Estimated Fluid Needs: 1 mL/kcal   Justification: Maintenance and Per provider pending fluid status    PHYSICAL FINDINGS  See malnutrition section below.  -No skin deficits noted in RN flowsheets, no WOC notes    MALNUTRITION**Nutrition focused physical exam available per MD request. --> Unable to obtain in-person nutrition history or nutrition focused physical assessment (NFPA) from patient as the number of staff going into rooms is restricted to limit exposure and to minimize use of PPE.  % Intake: Decreased intake does not meet criteria  % Weight Loss: Weight loss does not meet criteria  Subcutaneous Fat Loss: Unable to assess  Muscle Loss: Unable to assess  Fluid Accumulation/Edema: None noted per chart  Malnutrition Diagnosis: Unable to determine due to unable to complete all parameters of nutrition assessment at this time.    NUTRITION DIAGNOSIS  Inadequate oral intake related to decreased/poor appetite as evidenced by pt report and eating 0-75% meals per RN flowsheets since 9/6.     INTERVENTIONS  Implementation  -Nutrition Education: Provided education on RD  role, importance of adequate PO and supplements available.   -Medical food supplement therapy     Goals  Patient to consume % of nutritionally adequate meal trays TID, or the equivalent with supplements/snacks.     Monitoring/Evaluation  Progress toward goals will be monitored and evaluated per protocol.    Etelvina Tang RD, LD  Pager: [phone]

## 2020-09-08 NOTE — PROCEDURES
INTERVENTIONAL PULMONOLOGY       Procedure(s):    A flexible bronchoscopy  Airway exam  BAL  EBUS-TBNA (1 sites)  Therapeutic suctioning (1 sites)  Endobronchial biopsy    Indication:  New RUL lesion in patient with history of heart transplant 5/2020    Attending of Record:  Donny Mercedes MD    Interventional Pulmonary Fellow   Sasha Nolan MD     Trainees Present:   None     Medications:    9 ml 4% lidocaine  12 ml 1% lidocaine  2.5 mg versed  175 mcg fentanyl  50 mg benadryl    Sedation Time:   Total sedation time was Choose Duration: 30 minutes of continuous bedside 1:1 monitoring.    Time Out:  Performed    The patient's medical record has been reviewed.  The indication for the procedure was reviewed.  The necessary history and physical examination was performed and reviewed.  The risks, benefits and alternatives of the procedure were discussed with the the patient in detail and he had the opportunity to ask questions. All questions were answered to the best of my ability.  Verbal and written informed consent was obtained.  The proposed procedure and the patient's identification were verified prior to the procedure by the physician and the nurse, respiratory therapist.    The patient was assessed for the adequacy for the procedure and to receive medications.     After clinical evaluation and reviewing the indication, risks, alternatives and benefits of the procedure the patient was deemed to be in satisfactory condition to undergo the procedure.      Immediately before administration of medications the patient was re-assessed for adequacy to receive sedatives including the heart rate, respiratory rate, mental status, oxygen saturation, blood pressure and adequacy of pulmonary ventilation. These same parameters were continuously monitored throughout the procedure.    A Tuberculosis risk assessment was performed:  The patient has no known RISK of Tuberculosis    The procedure was performed in a negative  airflow room: Yes    Maneuvers / Procedure:      Airway Examination: A complete airway examination was performed from the distal trachea to the subsegmental level in each lobe of both lungs.  Pertinent findings include: Endobronchial lesions were seen in RUL between apical and posterior segment takeoff (see photo) with component of extrinsic compression of apical and posterior segments. Focep biopsy was taken of endobronchial lesion.    BAL: The bronchoscope was wedged into the RUL anterior bronchus. A total of 120cc of saline was instilled and a total of 50cc was aspirated. This was sent for analysis.     EBUS-TBNA: The EBUS scope was inserted and biopsies were obtained from Station 11R with 5 passes, 5 samples obtained with EREN present, a combination of suction and no suction was used to obtain the samples. EBUS samples were sent for cytology, bacterial culture and fungal culture.            Due to coughing and intermittent desaturations (requiring jaw thrust), no further lymph nodes were sampled.                              Any disposable equipment was visually inspected and deemed to be intact immediately post procedure.      Relevant Pictures  RUL endobronchial lesion (between apical and posterior segment takeoff)      RUL endobronchial lesion      Recommendations:     --BAL, endobronchial biopsy, and EBUS-TBNA of station 11R completed.  --Followup results.    Sasha Nolan  Interventional Pulmonary Fellow  [phone]

## 2020-09-08 NOTE — PLAN OF CARE
[id number]    Major Shift Events:  No acute events. Neurologically intact. Moves well w/ SBA. Noted RADER. On 2 L NC. Intermittent dry cough. Prn tylenol for chest pain that pt states is r/t coughing (pt w/ unremarkable trop & EKG yesterday for CP) Temperature now WNL. HR remains -120s. BP stable. NPO since midnight. Denies nausea or abdominal discomfort. Voids spontaneously. No loose stools.    Plan: Continue to monitor pt & notify MD of any acute changes.     Lissette Garcia RN    For vital signs and complete assessments, please see documentation flowsheets.

## 2020-09-08 NOTE — PROGRESS NOTES
"BP (P) 105/76   Pulse 116   Temp 98.2  F (36.8  C) (Oral)   Resp 16   Ht 1.626 m (5' 4\")   Wt 78.9 kg (174 lb)   SpO2 96%   BMI 29.87 kg/m       Neuro: A&Ox4.   Cardiac: Afebrile, VSS.   Respiratory: RA while awake. 2L when asleep.  GI/: Voiding spontaneously. No BM this shift.   Diet/appetite: Tolerating diet. Denies nausea   Activity: up SBA  Pain: Denies   Skin: No new deficits noted.  Lines:PIV  Drains: None  No new complaints.Will continue to monitor and follow plan of care.       "

## 2020-09-08 NOTE — PROVIDER NOTIFICATION
Notified MD [id number] of temp of 100.7ax and to ask if lantus 28 units should be given when NPO at midnight.Said to give as his blood glucoses fasting have been good.Will order with blood cultures.

## 2020-09-08 NOTE — PROGRESS NOTES
[facility]   Cardiology II Service / Advanced Heart Failure  Daily Progress Note      Patient: Mariusz Sharp  MRN: [id number]  Admission Date: 8/31/2020  Hospital Day # 8    Assessment and Plan: Mariusz Sharp is a 59 year old male with history of CAD, LV thrombus, CKD Stage III, PAF, DM Type II, and ICM s/p HM III LVAD as BTT with subsequent OHT 5/18/20. He presented with progressive SOB, cough, and fever, found to have RUL mass.     Today's Plan:  - EBUS and BAL today  - f/u BAL cultures and tissue studies  - monitor repeat blood cultures  - decrease MMF to 500 mg bid  - decrease tacrolimus 0.5 mg BID, HOLD TONIGHT, recheck trough in AM  - posaconazole level in AM     #Fever with cough secondary to RUL mass. Tmax 102 prior to admission. History of recent rhinovirus. CDIF, LA, UA, COVID, respiratory viral panel Legionella, Cryptococcus, Strep pneumo negative, Aspergillus, CMV, and EBV all negative. Renal US notes resolution of prior stone. Chest X-ray negative. History of PAcnes to outflow graft and Donor positive S Anginosus and Veillonella (completed course of antibiotics). Concern for Chronic Prostatitis. Vanco/Zosyn discontinued 9/1. CT Chest positive for RUL mass concerning for PTLD vs infection. Note symptoms worse following cessation of antibiotics.   - Transplant ID following, appreciate recs.    - Beta D glucan 90 and trending upward   - started on posaconazole 9/4, 300 mg daily   - posaconazole level tomorrow (after 5 days)  - Blood cultures no growth to date   - Blastomyces, histoplasma PCR pending  - PJP: negative 9/6/20   - Aggressive pulmonary toileting and nebs   -  IR Pulmonary following, EBUS today       - BAL for immunocompromised studies               -- tissue for histopathology, AFB, bacterial and fungal stains                -- tissue for bacterial, AFB and fungal cultures               -- if able to get additional tissue, please send fresh sample for fungal DNA 28S rRNA  -  Continue Cefdinir 300 mg BID through 10/1/20 for prostatitis      # Status post OHT on 5/18/20, history of NICM and LVAD  # Chronic immunosuppression  Echo 8/31 with EF 70% and normal graft function. mRA-3, RV-20/2, mPA-15, mPCWP-10, AMRIT CO-4.91, AMRIT CI-2.63 (weight 178 lb), biopsy negative.     Graft Function:   --Volume: euvolemic to hypovolemic   --BP: /66-81 on hydralazine 12.5 mg tid (HOLD for sBP<100)  --HR: 110s likely 2/2 infection     Immunosuppression:   --recently off prednisone per taper protocol  --Cellcept decrease to 500 mg BID given low Immuknow  --decrease tacrolimus 0.5 mg bid and HOLD DOSE TONIGHT, trough 15 today, goal 8-10, daily troughs for now   --Immuknow 9/3 33    Serostatus: CMV: D+, R-, EBV: D- R+, Toxo D- R-    Prophylaxis:   --CAV: aspirin 81 mg and rosuvastatin 10 mg daily  --Thrush: on posaconazole  --PCP: Bactrim caused hyperK, treated with pentamidine, no longer indicated as off prednisone  --GI: Protonix   --Osteoporosis: calcium/vitamin D   --CMV: D+/R-, Valcyte renally dosed 450 mg daily x 6 mos (through November)    #ANDREW  #Hyponatremia likely hypovolemic  Cr 1.4 from 1.1 - suspect prerenal ANDREW +supratherpeutic tac level. Na 127, improved with IVF suggesting due to hypovolemia.  - NS 500mL if poor PO intake    #HTN. Prior to admission Norvasc on hold.  - hydralazine 12.5 mg TID (pta 25 mg TID), HOLD for sBP<100 prior to giving  - monitor     #Syncope, resolved. Occurred with cough prior to admission. MAPs stable.   - aggressive pulmonary toileting and nebs.      #Diarrhea, resolved.   - CDIF negative and CMV negative.      #DM Type II.   - Lantus 28 units at HS.   - Novolog preprandial with medium intensity sliding scale insulin.   - endocrine consulted today given infection and continued elevated BS with lantus increases     #Elevated PSA with Concern for Chronic Prostatitis. PSA 8.21. MRI consistent with BPH.   -Cefdinir 300 mg BID through 10/1/20    FEN: diabetic  "diet  PROPHY:  subQ heparin HOLD PRIOR TO EBUS, PPI  LINES:  PIV  DISPO:  Pending study results  CODE STATUS:  Full code    Dinora Leroy, DNP, NP-C  Advanced Heart Failure/Cardiology II Service  Pager [phone] [facility] [id number]    ================================================================    Subjective/24-Hr Events:   Last 24 hr care team notes reviewed. Fever overnight to 100.7, repeat blood cultures sent. Felt diaphoretic at that time, no chills. No fever this AM. Cough is unchanged, occasionally productive. Denies shortness of breath. On RA this AM. No new complaints.     ROS:  4 point ROS including respiratory, CV, GI and  (other than that noted in the HPI) is negative.     Medications: Reviewed in EPIC.     Physical Exam:   BP 90/65 (BP Location: Left arm)   Pulse 113   Temp 98.3  F (36.8  C) (Oral)   Resp 18   Ht 1.626 m (5' 4\")   Wt 78.9 kg (174 lb)   SpO2 96%   BMI 29.87 kg/m      GENERAL: Appears comfortable, in no distress, fatigued.  HEENT: Eye symmetrical, no discharge or icterus bilaterally. Mucous membranes moist and without lesions.  NECK: Supple, JVD not visible at 45 degrees.   CV: Tachycardic and regular, +S1S2, no murmur, rub, or gallop. Reproducible R sided chest tenderness to palpation.   RESPIRATORY: Respirations regular, even, and unlabored. Lungs CTA throughout. No crackles or wheezes.  GI: Soft, obese and non distended with normoactive bowel sounds present in all quadrants. No tenderness, rebound, guarding.   EXTREMITIES: No peripheral edema. 2+ bilateral pedal pulses.   NEUROLOGIC: Alert and oriented x 3. No focal deficits.   MUSCULOSKELETAL: No joint swelling or tenderness.   SKIN: No jaundice. No rashes or lesions.     Labs:  CMP  Recent Labs   Lab 09/08/20  0548 09/07/20  0523 09/06/20  0429 09/05/20  1403 09/05/20  0549 09/04/20  0543 09/03/20  0526 09/02/20  0551   * 127* 128* 128* 128* 134 136 137   POTASSIUM 4.0 4.1 4.2 5.3 4.0 3.9 4.2 4.1   CHLORIDE 99 98 98 99 99 " 104 106 107   CO2 22 21 22 22 20 22 23 22   ANIONGAP 9 9 7 6 8 8 7 7   * 266* 234* 186* 206* 115* 191* 126*   BUN 22 25 23 21 22 17 23 24   CR 1.39* 1.44* 1.35* 1.38* 1.41* 1.16 1.13 1.32*   GFRESTIMATED 56* 53* 58* 56* 55* 69 71 59*   GFRESTBLACK 64 62 67 65 63 80 83 69   ARLENE 9.1 8.9 8.6 9.0 8.6 9.0 8.6 8.5   MAG 1.8 1.8 1.7  --  1.7 1.7 1.6 1.9   PROTTOTAL  --   --   --   --  6.6* 6.5* 6.4* 6.0*   ALBUMIN  --   --   --   --  2.5* 2.6* 2.5* 2.3*   BILITOTAL  --   --   --   --  0.8 0.8 0.4 0.5   ALKPHOS  --   --   --   --  68 66 66 58   AST  --   --   --   --  22 21 19 19   ALT  --   --   --   --  15 12 16 14       CBC  Recent Labs   Lab 09/08/20  0548 09/07/20  0523 09/06/20  0429 09/05/20  0549   WBC 7.1 6.8 5.9 5.3   RBC 3.09* 2.95* 3.07* 2.99*   HGB 8.4* 8.2* 8.4* 8.3*   HCT 26.6* 26.2* 26.6* 26.5*   MCV 86 89 87 89   MCH 27.2 27.8 27.4 27.8   MCHC 31.6 31.3* 31.6 31.3*   RDW 13.8 13.7 13.9 13.9    341 322 353         Time/Communication  I personally spent a total of 25 minutes. Of that 15 minutes was counseling/coordination of patient's care. Plan of care discussed with patient. See my note above for details.     Patient discussed with Dr. Ortiz.

## 2020-09-08 NOTE — CONSULTS
"Diabetes/Hyperglycemia Management Consult    Chief Complaint poor BG control despite eating little and glargine increase  Consult requested by: Marcy MORALES  History of Present Illness   Mariusz Sharp \"Red\" is a 59 year old male with history of CAD, LV thrombus, CKD Stage III, PAF, DM Type II, and ICM s/p HM III LVAD as BTT (2018) with subsequent OHT 5/18/20. He presented with progressive SOB, cough, and fever, found to have RUL mass.   Down for procedure this morning: \"flexible bronchoscopy, Airway exam. BAL, EBUS-TBNA (1 sites), Therapeutic suctioning (1 sites), Endobronchial biopsy\".  \"Red\" is known to the diabetes from previous admissions, last in May after transplant.  He discharged on higher doses of insulin, but they tapered with prednisone taper.  Outpatient, he has been following with [facility] for diabetes since June.  No changes to dosing since his 7/31/2020 visit.  BGs were better at home, fewer 200s and no low values.  Was wearing his Dexcom until last Wed-- didn't have a new sensor with him in hospital.  No prednisone at this time.  Was on 5 mg daily when last hospitalized 8/21.  Currently creatinine 1.4 last few days versus 1.6 on admission.  Glargine 20 units started on admission.  Glucose in control then rising to 200s starting 9/2.  Improvement in renal function would improve insulin clearance.  Red denies any increase in PO and says appetite is poor.  Denies snacks between meals.  Glargine increased to 28 units two nights ago.  Fasting BG is best in 3 days.  Yesterday post-prandial BGs in the 200s, receiving aspart 1 per 8 grams carb.  Recent Labs   Lab 09/08/20  0844 09/08/20  0548 09/07/20  2129 09/07/20  1839 09/07/20  1621 09/07/20  1048 09/07/20  0853 09/07/20  0523  09/06/20  0429  09/05/20  1403  09/05/20  0549  09/04/20  0543   GLC  --  171*  --   --   --   --   --  266*  --  234*  --  186*  --  206*  --  115*   *  --  219* 204* 239* 198* 221*  --    < >  --    < >  --    " < >  --    < >  --     < > = values in this interval not displayed.         Diabetes Type: 2  Diabetes Duration: 4 years  Usual Diabetes Regimen:   4-5 x/day + BG monitoring frequency-- Dexcom G6  Lantus 32 units at HS  Novolog 1 units per 8 grams carb  Novolog High (1 per 25) resistance scale    Ability to Traver Prescribed Regimen: good and has improved  Diabetes Control:   Average glucose from Dexcom download on 7/31/2020 was 180 mg/dL  Lab Results   Component Value Date    A1C 7.4 08/19/2020    A1C 7.2 08/13/2020    A1C 7.8 06/15/2020    A1C 8.0 05/12/2020    A1C 9.5 07/05/2019     Diabetes Complications: CAD, nephropathy?  History of DKA: no  Able to Detect Hypoglycemia: yes  Usual Diabetes Care Provider: Jenanette Schafer PA-C  Factors Impacting Glucose Control: acute illness, changing renal function, elevated tacrolimus level, decreased appetite and activity      Review of Systems  10 point ROS completed with pertinent positives and negatives noted in the HPI    Past medical, family and social histories are reviewed and updated.    Past Medical History  Past Medical History:   Diagnosis Date     Acute kidney injury (H)      CKD (chronic kidney disease)      Coronary artery disease      Diabetes mellitus (H)      HTN (hypertension)      Hyperlipidemia      ICD (implantable cardioverter-defibrillator), single chamber- NOT dependent 7/17/2018     Ischemic cardiomyopathy      LV (left ventricular) mural thrombus      Paroxysmal atrial flutter (H)      RVF (right ventricular failure) (H)      Systolic heart failure (H)      Ventricular tachycardia (H)        Family History  Mother and father have type 2 diabetes    Social History  Social History     Socioeconomic History     Marital status: Single     Spouse name: Not on file     Number of children: Not on file     Years of education: Not on file     Highest education level: Not on file   Occupational History     Not on file   Social Needs     Financial resource  "strain: Not on file     Food insecurity     Worry: Not on file     Inability: Not on file     Transportation needs     Medical: Not on file     Non-medical: Not on file   Tobacco Use     Smoking status: Never Smoker     Smokeless tobacco: Never Used   Substance and Sexual Activity     Alcohol use: No     Frequency: Never     Drug use: No     Sexual activity: Not on file   Lifestyle     Physical activity     Days per week: Not on file     Minutes per session: Not on file     Stress: Not on file   Relationships     Social connections     Talks on phone: Not on file     Gets together: Not on file     Attends Buddhist service: Not on file     Active member of club or organization: Not on file     Attends meetings of clubs or organizations: Not on file     Relationship status: Not on file     Intimate partner violence     Fear of current or ex partner: Not on file     Emotionally abused: Not on file     Physically abused: Not on file     Forced sexual activity: Not on file   Other Topics Concern     Parent/sibling w/ CABG, MI or angioplasty before 65F 55M? No   Social History Narrative     Not on file         Physical Exam  /85   Pulse 120   Temp 98.3  F (36.8  C) (Oral)   Resp (!) 34   Ht 1.626 m (5' 4\")   Wt 78.9 kg (174 lb)   SpO2 95%   BMI 29.87 kg/m      General:  pleasant middle aged man resting in bed, in no distress.   HEENT: NC/AT, PER and anicteric, non-injected, oral mucous membranes moist.   Lungs: unlabored respiration, strong cough observed, interrupts speech  ABD: rounded  Skin: warm, dry, no obvious lesions  MSK:  fluid movement of all extremities  Lymp:  no LE edema   Mental status:  alert, oriented x3, communicating clearly  Psych:  calm, even mood    Laboratory  Recent Labs   Lab Test 09/08/20  0548 09/07/20  0523   * 127*   POTASSIUM 4.0 4.1   CHLORIDE 99 98   CO2 22 21   ANIONGAP 9 9   * 266*   BUN 22 25   CR 1.39* 1.44*   ARLENE 9.1 8.9     CBC RESULTS:   Recent Labs   Lab " Test 09/08/20  0548   WBC 7.1   RBC 3.09*   HGB 8.4*   HCT 26.6*   MCV 86   MCH 27.2   MCHC 31.6   RDW 13.8          Liver Function Studies -   Recent Labs   Lab Test 09/05/20  0549   PROTTOTAL 6.6*   ALBUMIN 2.5*   BILITOTAL 0.8   ALKPHOS 68   AST 22   ALT 15       Active Medications  Current Facility-Administered Medications   Medication     0.9% sodium chloride BOLUS     acetaminophen (TYLENOL) tablet 975 mg     albuterol (PROVENTIL) neb solution 2.5 mg     aspirin (ASA) chewable tablet 81 mg     benzocaine-menthol (CEPACOL) 15-3.6 MG lozenge 1 lozenge     calcium carbonate 600 mg-vitamin D 400 units (CALTRATE) per tablet 1 tablet     cefdinir (OMNICEF) capsule 300 mg     glucose gel 15-30 g    Or     dextrose 50 % injection 25-50 mL    Or     glucagon injection 1 mg     fentaNYL (PF) (SUBLIMAZE) injection     hydrALAZINE (APRESOLINE) half-tab 12.5 mg     insulin aspart (NovoLOG) injection (RAPID ACTING)     insulin aspart (NovoLOG) injection (RAPID ACTING)     insulin aspart (NovoLOG) injection (RAPID ACTING)     insulin aspart (NovoLOG) injection (RAPID ACTING)     insulin aspart (NovoLOG) injection (RAPID ACTING)     insulin glargine (LANTUS PEN) injection 28 Units     Lidocaine (LIDOCARE) 4 % Patch 1 patch    And     lidocaine patch in PLACE     lidocaine (LMX4) cream     lidocaine 1 % 0.1-1 mL     lidocaine 1 %     lidocaine 4 % injection     magnesium sulfate 2 g in water intermittent infusion     magnesium sulfate 4 g in 100 mL sterile water (premade)     midazolam (VERSED) injection     mycophenolate (GENERIC EQUIVALENT) tablet 1,000 mg     naloxone (NARCAN) injection 0.1-0.4 mg     oxyCODONE (ROXICODONE) tablet 5 mg     Patient is already receiving mechanical prophylaxis     posaconazole (NOXAFIL) EC tablet TBEC 300 mg     potassium chloride (KLOR-CON) Packet 20-40 mEq     potassium chloride 10 mEq in 100 mL intermittent infusion with 10 mg lidocaine     potassium chloride 10 mEq in 100 mL sterile  "water intermittent infusion (premix)     potassium chloride 20 mEq in 50 mL intermittent infusion     potassium chloride ER (KLOR-CON M) CR tablet 20-40 mEq     rosuvastatin (CRESTOR) tablet 10 mg     sodium chloride (PF) 0.9% PF flush 3 mL     sodium chloride (PF) 0.9% PF flush 3 mL     sodium chloride 0.9% (bottle) irrigation     tacrolimus (GENERIC EQUIVALENT) capsule 1 mg     valGANciclovir (VALCYTE) tablet 450 mg       Current Diet  Orders Placed This Encounter      NPO per Anesthesia Guidelines for Procedure/Surgery Except for: Meds        Assessment  Mariusz Sahrp \"Red\" is a 59 year old male with history of CAD, LV thrombus, CKD Stage III, PAF, DM Type II, and ICM s/p HM III LVAD as BTT (2018) with subsequent OHT 5/18/20, presenting with progressive SOB, cough, and fever, found to have RUL mass.   Glucose well-controlled initially, then persistently 200s the last few days.  Improved again now that patient has been NPO.  Will benefit from increased prandial aspart dosing.    Plan  - increase aspart from 1 per 8 grams to 1 unit per 6.5 grams carbohydrate  - increase aspart from medium to high resistance qAC, HS   - continue glargine 28 units at HS  - BG monitor qAC, HS 0200  - if appetite were to improve, could discuss GLP-1 RA  - resume Dexcom when able to get another sensor    Paola Triplett CARMEN [facility] [phone]    Diabetes Management Team job code: [id number]  I spent a total of 80 minutes bedside and on the inpatient unit managing the glycemic care of Mariusz Sharp. Over 50% of my time on the unit was spent counseling the patient  and/or coordinating care regarding acute diabetes mgmnt.  See note for details.      "

## 2020-09-08 NOTE — PLAN OF CARE
OT 6C. Cancel. Pt OOR this AM, unable to check back this PM due to scheduling. Will reschedule per POC.

## 2020-09-09 ENCOUNTER — APPOINTMENT (OUTPATIENT)
Dept: OCCUPATIONAL THERAPY | Facility: CLINIC | Age: 60
End: 2020-09-09
Payer: COMMERCIAL

## 2020-09-09 LAB
ANION GAP SERPL CALCULATED.3IONS-SCNC: 9 MMOL/L (ref 3–14)
BUN SERPL-MCNC: 24 MG/DL (ref 7–30)
C DIFF TOX B STL QL: NEGATIVE
CALCIUM SERPL-MCNC: 8.8 MG/DL (ref 8.5–10.1)
CHLORIDE SERPL-SCNC: 100 MMOL/L (ref 94–109)
CO2 SERPL-SCNC: 20 MMOL/L (ref 20–32)
COPATH REPORT: NORMAL
CREAT SERPL-MCNC: 1.59 MG/DL (ref 0.66–1.25)
ERYTHROCYTE [DISTWIDTH] IN BLOOD BY AUTOMATED COUNT: 13.7 % (ref 10–15)
GFR SERPL CREATININE-BSD FRML MDRD: 47 ML/MIN/{1.73_M2}
GLUCOSE SERPL-MCNC: 163 MG/DL (ref 70–99)
GRAM STN SPEC: NORMAL
GRAM STN SPEC: NORMAL
HCT VFR BLD AUTO: 24.4 % (ref 40–53)
HGB BLD-MCNC: 7.6 G/DL (ref 13.3–17.7)
MAGNESIUM SERPL-MCNC: 1.8 MG/DL (ref 1.6–2.3)
MCH RBC QN AUTO: 27.1 PG (ref 26.5–33)
MCHC RBC AUTO-ENTMCNC: 31.1 G/DL (ref 31.5–36.5)
MCV RBC AUTO: 87 FL (ref 78–100)
PLATELET # BLD AUTO: 340 10E9/L (ref 150–450)
POTASSIUM SERPL-SCNC: 4.1 MMOL/L (ref 3.4–5.3)
RBC # BLD AUTO: 2.8 10E12/L (ref 4.4–5.9)
SODIUM SERPL-SCNC: 130 MMOL/L (ref 133–144)
SPECIMEN SOURCE: NORMAL
SPECIMEN SOURCE: NORMAL
TACROLIMUS BLD-MCNC: 11.6 UG/L (ref 5–15)
TME LAST DOSE: NORMAL H
WBC # BLD AUTO: 6 10E9/L (ref 4–11)

## 2020-09-09 PROCEDURE — 87493 C DIFF AMPLIFIED PROBE: CPT

## 2020-09-09 PROCEDURE — 21400000 ZZH R&B CCU UMMC

## 2020-09-09 PROCEDURE — 80197 ASSAY OF TACROLIMUS: CPT | Performed by: NURSE PRACTITIONER

## 2020-09-09 PROCEDURE — 36415 COLL VENOUS BLD VENIPUNCTURE: CPT | Performed by: STUDENT IN AN ORGANIZED HEALTH CARE EDUCATION/TRAINING PROGRAM

## 2020-09-09 PROCEDURE — 87040 BLOOD CULTURE FOR BACTERIA: CPT

## 2020-09-09 PROCEDURE — 83735 ASSAY OF MAGNESIUM: CPT | Performed by: STUDENT IN AN ORGANIZED HEALTH CARE EDUCATION/TRAINING PROGRAM

## 2020-09-09 PROCEDURE — 93005 ELECTROCARDIOGRAM TRACING: CPT

## 2020-09-09 PROCEDURE — 36415 COLL VENOUS BLD VENIPUNCTURE: CPT | Performed by: INTERNAL MEDICINE

## 2020-09-09 PROCEDURE — 85027 COMPLETE CBC AUTOMATED: CPT | Performed by: STUDENT IN AN ORGANIZED HEALTH CARE EDUCATION/TRAINING PROGRAM

## 2020-09-09 PROCEDURE — 80187 DRUG ASSAY POSACONAZOLE: CPT | Performed by: NURSE PRACTITIONER

## 2020-09-09 PROCEDURE — 93010 ELECTROCARDIOGRAM REPORT: CPT | Performed by: INTERNAL MEDICINE

## 2020-09-09 PROCEDURE — 36415 COLL VENOUS BLD VENIPUNCTURE: CPT | Performed by: NURSE PRACTITIONER

## 2020-09-09 PROCEDURE — 25000131 ZZH RX MED GY IP 250 OP 636 PS 637: Performed by: NURSE PRACTITIONER

## 2020-09-09 PROCEDURE — 36415 COLL VENOUS BLD VENIPUNCTURE: CPT

## 2020-09-09 PROCEDURE — 97535 SELF CARE MNGMENT TRAINING: CPT | Mod: GO

## 2020-09-09 PROCEDURE — 87103 BLOOD FUNGUS CULTURE: CPT | Performed by: INTERNAL MEDICINE

## 2020-09-09 PROCEDURE — 25000132 ZZH RX MED GY IP 250 OP 250 PS 637: Performed by: NURSE PRACTITIONER

## 2020-09-09 PROCEDURE — 99232 SBSQ HOSP IP/OBS MODERATE 35: CPT | Performed by: NURSE PRACTITIONER

## 2020-09-09 PROCEDURE — 25000132 ZZH RX MED GY IP 250 OP 250 PS 637: Performed by: STUDENT IN AN ORGANIZED HEALTH CARE EDUCATION/TRAINING PROGRAM

## 2020-09-09 PROCEDURE — 25000132 ZZH RX MED GY IP 250 OP 250 PS 637: Performed by: INTERNAL MEDICINE

## 2020-09-09 PROCEDURE — 97110 THERAPEUTIC EXERCISES: CPT | Mod: GO

## 2020-09-09 PROCEDURE — 80048 BASIC METABOLIC PNL TOTAL CA: CPT | Performed by: STUDENT IN AN ORGANIZED HEALTH CARE EDUCATION/TRAINING PROGRAM

## 2020-09-09 PROCEDURE — 25000128 H RX IP 250 OP 636: Performed by: NURSE PRACTITIONER

## 2020-09-09 RX ORDER — HYDRALAZINE HYDROCHLORIDE 25 MG/1
25 TABLET, FILM COATED ORAL 3 TIMES DAILY
Status: DISCONTINUED | OUTPATIENT
Start: 2020-09-09 | End: 2020-09-16

## 2020-09-09 RX ADMIN — MYCOPHENOLATE MOFETIL 500 MG: 500 TABLET ORAL at 17:47

## 2020-09-09 RX ADMIN — Medication 12.5 MG: at 14:26

## 2020-09-09 RX ADMIN — Medication 1 TABLET: at 17:47

## 2020-09-09 RX ADMIN — ASPIRIN 81 MG CHEWABLE TABLET 81 MG: 81 TABLET CHEWABLE at 08:12

## 2020-09-09 RX ADMIN — Medication 25 MG: at 19:42

## 2020-09-09 RX ADMIN — MYCOPHENOLATE MOFETIL 500 MG: 500 TABLET ORAL at 08:14

## 2020-09-09 RX ADMIN — TACROLIMUS 0.5 MG: 0.5 CAPSULE ORAL at 17:47

## 2020-09-09 RX ADMIN — CEFDINIR 300 MG: 300 CAPSULE ORAL at 19:44

## 2020-09-09 RX ADMIN — MAGNESIUM SULFATE IN WATER 2 G: 40 INJECTION, SOLUTION INTRAVENOUS at 08:14

## 2020-09-09 RX ADMIN — Medication 12.5 MG: at 08:12

## 2020-09-09 RX ADMIN — POSACONAZOLE 300 MG: 100 TABLET, DELAYED RELEASE ORAL at 08:13

## 2020-09-09 RX ADMIN — LIDOCAINE 1 PATCH: 560 PATCH PERCUTANEOUS; TOPICAL; TRANSDERMAL at 19:53

## 2020-09-09 RX ADMIN — CEFDINIR 300 MG: 300 CAPSULE ORAL at 08:13

## 2020-09-09 RX ADMIN — INSULIN GLARGINE 28 UNITS: 100 INJECTION, SOLUTION SUBCUTANEOUS at 21:58

## 2020-09-09 RX ADMIN — ROSUVASTATIN CALCIUM 10 MG: 10 TABLET, FILM COATED ORAL at 21:58

## 2020-09-09 RX ADMIN — Medication 1 TABLET: at 08:13

## 2020-09-09 RX ADMIN — ACETAMINOPHEN 975 MG: 325 TABLET, FILM COATED ORAL at 08:11

## 2020-09-09 RX ADMIN — ACETAMINOPHEN 975 MG: 325 TABLET, FILM COATED ORAL at 01:31

## 2020-09-09 RX ADMIN — TACROLIMUS 0.5 MG: 0.5 CAPSULE ORAL at 08:13

## 2020-09-09 RX ADMIN — VALGANCICLOVIR 450 MG: 450 TABLET, FILM COATED ORAL at 08:12

## 2020-09-09 RX ADMIN — ACETAMINOPHEN 975 MG: 325 TABLET, FILM COATED ORAL at 23:57

## 2020-09-09 ASSESSMENT — PAIN DESCRIPTION - DESCRIPTORS
DESCRIPTORS: ACHING
DESCRIPTORS: DISCOMFORT
DESCRIPTORS: ACHING

## 2020-09-09 ASSESSMENT — ACTIVITIES OF DAILY LIVING (ADL)
ADLS_ACUITY_SCORE: 14
ADLS_ACUITY_SCORE: 14
ADLS_ACUITY_SCORE: 13
ADLS_ACUITY_SCORE: 16
ADLS_ACUITY_SCORE: 13
ADLS_ACUITY_SCORE: 13

## 2020-09-09 ASSESSMENT — MIFFLIN-ST. JEOR: SCORE: 1527.53

## 2020-09-09 NOTE — PLAN OF CARE
D: Patient admitted 8/31 w/ progressive shortness of breath, cough, and fever. Hx. HTN, CAD, CKD3, PAF, DM2, ICM s/p LVAD HM3 w/ subsequent OHT 5/18/20.  I/A: A&Ox4. VSS on 3L oxymask overnight. RADER. Afebrile. ST 110s-120s. C/o R upper chest pain (intermittent/same) relieved by prn tylenol.  overnight. Adequate uop. SBA. Appeared to rest comfortably overnight.   P: Continue to monitor and notify Cards 2 with questions/concerns.

## 2020-09-09 NOTE — PLAN OF CARE
OT 6C  Discharge Planner OT   Patient plan for discharge: Not discussed today  Current status: SBA supine <> EOB with HOB elevated. SBA to walk to in-room sink. SBA g/h standing at sink. CGA to walk back to EOB, pt was unsteady walking back to sit EOB. Pt completed 10 reps each of 2 BUE AROM exercises while seated EOB.   Barriers to return to prior living situation: acute medical needs, decreased activity tolerance, decreased IND with ADLs, fatigue  Recommendations for discharge: TCU, pending progress discharge home with assist and OP CR  Rationale for recommendations: Pt is below baseline and would benefit from continued skilled therapy to increase activity tolerance and independence with ADLs.          Entered by: Madelyn Miranda 09/09/2020 1:07 PM

## 2020-09-09 NOTE — PROGRESS NOTES
"                          Diabetes Consult Daily  Progress Note          Assessment/Plan:   Mariusz Sharp \"Red\" is a 59 year old male with history of CAD, LV thrombus, CKD Stage III, PAF, DM Type II, and ICM s/p HM III LVAD as BTT (2018) with subsequent OHT 5/18/20, presenting with progressive SOB, cough, and fever, found to have RUL mass.   Diabetes service consulted for persistent hyperglycemia, which seemed to be most related to insufficient/absent aspart for carbohydrates.       Glucose control has improved.    Plan  - continue aspart 1 unit per 6.5 grams carbohydrate for meals and PRN snacks-- pt advised to call for coverage when half way through his supps  - continue aspart high resistance qAC, HS   - continue glargine 28 units at HS  - BG monitor qAC, HS 0200  - if appetite were to improve, could discuss GLP-1 RA again (previously needed a prior authorization for this class-- unknown if any changes to insurance)  - resume Dexcom when able to get another sensor         Outpatient diabetes follow up: [facility] Jeannette Schafer PA-C  Plan discussed with patient,            Interval History:     The last 24 hours progress and nursing notes reviewed.  Had trouble with increased coughing yesterday after bronch  Finally ate in the evening.  Feeling better today around lunch time.  Started drinking a Boost GC around 1230 today.  Says before he had Boost Plus, but it's been a couple days at least since had one.  Appetite remains decreased  Creatinine increased today    Recent Labs   Lab 09/09/20  1213 09/09/20  0824 09/09/20  0535 09/09/20  0130 09/08/20  2128 09/08/20  1835 09/08/20  1238  09/08/20  0548  09/07/20  0523  09/06/20  0429  09/05/20  1403  09/05/20  0549   GLC  --   --  163*  --   --   --   --   --  171*  --  266*  --  234*  --  186*  --  206*   * 142*  --  193* 178* 161* 167*   < >  --    < >  --    < >  --    < >  --    < >  --     < > = values in this interval not displayed.           Nutrition: " "    Orders Placed This Encounter      Consistent Carbohydrate Diet [id number] Calories: Moderate Consistent CHO (4-6 CHO units/meal)    Supplements: Boost Glucose control (had Boost Plus before)        PTA Regimen:     4-5 x/day + BG monitoring frequency-- Dexcom G6  Lantus 32 units at HS  Novolog 1 units per 8 grams carb  Novolog High (1 per 25) resistance scale             Review of Systems:   See interval hx          Medications:   NO steroid at present         Physical Exam:     Gen: Alert, resting in bed, in NAD   HEENT: NC/AT, oral MMM hearing intact to conversational volume  Resp: Unlabored at rest, no cough observed, off supp O2  Neuro: oriented x3, communicating clearly  Psych: calm even mood  /86 (BP Location: Left arm)   Pulse 104   Temp 98.2  F (36.8  C) (Oral)   Resp 18   Ht 1.626 m (5' 4\")   Wt 79.2 kg (174 lb 8 oz)   SpO2 93%   BMI 29.95 kg/m               Data:     Lab Results   Component Value Date    A1C 7.4 08/19/2020    A1C 7.2 08/13/2020    A1C 7.8 06/15/2020    A1C 8.0 05/12/2020    A1C 9.5 07/05/2019              CBC RESULTS:   Recent Labs   Lab Test 09/09/20  0535   WBC 6.0   RBC 2.80*   HGB 7.6*   HCT 24.4*   MCV 87   MCH 27.1   MCHC 31.1*   RDW 13.7        Recent Labs   Lab Test 09/09/20  0535 09/08/20  0548   * 130*   POTASSIUM 4.1 4.0   CHLORIDE 100 99   CO2 20 22   ANIONGAP 9 9   * 171*   BUN 24 22   CR 1.59* 1.39*   ARLENE 8.8 9.1     Liver Function Studies -   Recent Labs   Lab Test 09/05/20  0549   PROTTOTAL 6.6*   ALBUMIN 2.5*   BILITOTAL 0.8   ALKPHOS 68   AST 22   ALT 15     Lab Results   Component Value Date    INR 1.06 08/26/2020    INR 1.16 06/04/2020    INR 1.16 06/01/2020    INR 1.54 05/18/2020         Paola Tobarpton APRN [facility] [phone]  Diabetes Management job code [id number]                  "

## 2020-09-09 NOTE — PLAN OF CARE
D: Patient admitted 8/31/20 with progressive SOB, cough, and fever, found to have RUL mass s/p biopsy on 9/8.     I/A: Monitored vitals and assessed patient status. A0x4. Lethargic. Pain in back controlled with Tylenol and hot packs. VSS. Sinus tach 100's. Afebrile. Urinating adequately. Incontinent BM episodes due to urgency/coughing spells. Commode at bedside. Poor appetite. Cards 2 NP notified.    Completed: Magnesium = 1.8; replaced with 2 gram per protocol  PRN: Tylenol    P: Continue to monitor patient status and report changes to treatment team.

## 2020-09-09 NOTE — PROGRESS NOTES
[facility]   Cardiology II Service / Advanced Heart Failure  Daily Progress Note      Patient: Mariusz Sharp  MRN: [id number]  Admission Date: 8/31/2020  Hospital Day # 9    Assessment and Plan: Mariusz Sharp is a 59 year old male with history of CAD, LV thrombus, CKD Stage III, PAF, DM Type II, and ICM s/p HM III LVAD as BTT with subsequent OHT 5/18/20. He presented with progressive SOB, cough, and fever, found to have RUL mass.     Today's Plan:  - f/u BAL cultures and tissue studies - rare pseudohyphae noted, ID made aware  - monitor blood cultures  - f/u tacrolimus level this PM  - f/u posaconazole level    #Fever with cough secondary to RUL mass. Tmax 102 prior to admission. History of recent rhinovirus. CDIF, LA, UA, COVID, respiratory viral panel Legionella, Cryptococcus, Strep pneumo negative, Aspergillus, CMV, and EBV all negative. Renal US notes resolution of prior stone. Chest X-ray negative. History of PAcnes to outflow graft and Donor positive S Anginosus and Veillonella (completed course of antibiotics). Concern for Chronic Prostatitis. Vanco/Zosyn discontinued 9/1. CT Chest positive for RUL mass concerning for PTLD vs infection. Note symptoms worse following cessation of antibiotics.   - Transplant ID following, appreciate recs.    - Beta D glucan 90 and trending upward   - started on posaconazole 9/4, 300 mg daily   - posaconazole level pending, goal >1  - last blood cultures 9/7 no growth to date   - Blastomyces, histoplasma, aspergillus PCR negative  - PJP negative 9/6/20   - Aggressive pulmonary toileting and nebs   - BAL and EBUS 9/8 with IR pulm   -- cytology negative for malignancy, rare pseudohyphae noted, PCP absent               -- tissue for histopathology, AFB, bacterial and fungal stains pending               -- tissue for bacterial, AFB and fungal cultures pending  - Continue Cefdinir 300 mg BID through 10/1/20 for prostatitis      # Status post OHT on 5/18/20, history  of NICM and LVAD  # Chronic immunosuppression  Echo 8/31 with EF 70% and normal graft function. mRA-3, RV-20/2, mPA-15, mPCWP-10, AMRIT CO-4.91, AMRIT CI-2.63 (weight 178 lb), biopsy negative.     Graft Function:   --Volume: euvolemic to hypovolemic, intermittent IVF  --BP: 103-138/ on hydralazine 12.5 mg tid (HOLD for sBP<100)  --HR: 110-120ss likely 2/2 infection     Immunosuppression:   --recently off prednisone per taper protocol  --Cellcept decrease to 500 mg BID given low Immuknow  --decreased tacrolimus 0.5 mg bid today, trough pending today, goal 8-10, daily troughs for now   --Immuknow 9/3 33    Serostatus: CMV: D+, R-, EBV: D- R+, Toxo D- R-    Prophylaxis:   --CAV: aspirin 81 mg and rosuvastatin 10 mg daily  --Thrush: on posaconazole  --PCP: Bactrim caused hyperK, treated with pentamidine, no longer indicated as off prednisone  --GI: Protonix   --Osteoporosis: calcium/vitamin D   --CMV: D+/R-, Valcyte renally dosed 450 mg daily x 6 mos (through November)    #ANDREW  #Hyponatremia likely hypovolemic  Cr 1.4 from 1.1 - suspect prerenal ANDREW +supratherpeutic tac level. Na 127, improved with IVF suggesting due to hypovolemia.  - intermittent NS 500mL if poor PO intake    #HTN. Prior to admission Norvasc on hold.  - hydralazine 12.5 mg TID (pta 25 mg TID), HOLD for sBP<100 prior to giving  - monitor     #Syncope, resolved. Occurred with cough prior to admission. MAPs stable.   - aggressive pulmonary toileting and nebs.      #Diarrhea, resolved.   - CDIF negative and CMV negative.      #DM Type II.   - Lantus 28 units at HS.   - Novolog carb coverage and ssi per endocrine    #Elevated PSA with Concern for Chronic Prostatitis. PSA 8.21. MRI consistent with BPH.   - Cefdinir 300 mg BID through 10/1/20    FEN: diabetic diet  PROPHY:  subQ heparin, PPI  LINES:  PIV  DISPO:  Pending study results  CODE STATUS:  Full code    Dinora Harper DNP, NP-C  Advanced Heart Failure/Cardiology II Service  Pager [phone] [facility]  "[phone]    ================================================================    Subjective/24-Hr Events:   Last 24 hr care team notes reviewed. Last fever yesterday. Cough worse since procedure yesterday, one episode of blood tinged. Denies shortness of breath. Overall still feeling poorly. On 3L oxygen. Denies new symptoms except some upper back pain. Denies lightheadedness. Spending most day in bed.     ROS:  4 point ROS including respiratory, CV, GI and  (other than that noted in the HPI) is negative.     Medications: Reviewed in EPIC.     Physical Exam:   /84 (BP Location: Left arm)   Pulse 108   Temp 98.3  F (36.8  C) (Oral)   Resp 16   Ht 1.626 m (5' 4\")   Wt 79.2 kg (174 lb 8 oz)   SpO2 92%   BMI 29.95 kg/m      GENERAL: Appears comfortable, in no distress, fatigued.  HEENT: Eye symmetrical, no discharge or icterus bilaterally. Mucous membranes moist and without lesions.  NECK: Supple, JVD not visible at 45 degrees.   CV: Tachycardic and regular, +S1S2, no murmur, rub, or gallop.   RESPIRATORY: Respirations regular, even, and unlabored. Lungs CTA throughout. No crackles or wheezes.  GI: Soft, obese and non distended with normoactive bowel sounds present in all quadrants. No tenderness, rebound, guarding.   EXTREMITIES: No peripheral edema. 2+ bilateral pedal pulses.   NEUROLOGIC: Alert and oriented x 3. No focal deficits.   MUSCULOSKELETAL: No joint swelling or tenderness.   SKIN: No jaundice. No rashes or lesions.     Labs:  CMP  Recent Labs   Lab 09/09/20  0535 09/08/20  0548 09/07/20  0523 09/06/20  0429  09/05/20  0549 09/04/20  0543 09/03/20  0526   * 130* 127* 128*   < > 128* 134 136   POTASSIUM 4.1 4.0 4.1 4.2   < > 4.0 3.9 4.2   CHLORIDE 100 99 98 98   < > 99 104 106   CO2 20 22 21 22   < > 20 22 23   ANIONGAP 9 9 9 7   < > 8 8 7   * 171* 266* 234*   < > 206* 115* 191*   BUN 24 22 25 23   < > 22 17 23   CR 1.59* 1.39* 1.44* 1.35*   < > 1.41* 1.16 1.13   GFRESTIMATED 47* 56* " 53* 58*   < > 55* 69 71   GFRESTBLACK 55* 64 62 67   < > 63 80 83   ARLENE 8.8 9.1 8.9 8.6   < > 8.6 9.0 8.6   MAG 1.8 1.8 1.8 1.7  --  1.7 1.7 1.6   PROTTOTAL  --   --   --   --   --  6.6* 6.5* 6.4*   ALBUMIN  --   --   --   --   --  2.5* 2.6* 2.5*   BILITOTAL  --   --   --   --   --  0.8 0.8 0.4   ALKPHOS  --   --   --   --   --  68 66 66   AST  --   --   --   --   --  22 21 19   ALT  --   --   --   --   --  15 12 16    < > = values in this interval not displayed.       CBC  Recent Labs   Lab 09/09/20  0535 09/08/20  0548 09/07/20  0523 09/06/20  0429   WBC 6.0 7.1 6.8 5.9   RBC 2.80* 3.09* 2.95* 3.07*   HGB 7.6* 8.4* 8.2* 8.4*   HCT 24.4* 26.6* 26.2* 26.6*   MCV 87 86 89 87   MCH 27.1 27.2 27.8 27.4   MCHC 31.1* 31.6 31.3* 31.6   RDW 13.7 13.8 13.7 13.9    361 341 322         Time/Communication  I personally spent a total of 25 minutes. Of that 15 minutes was counseling/coordination of patient's care. Plan of care discussed with patient. See my note above for details.     Patient discussed with Dr. Ortiz.

## 2020-09-09 NOTE — PLAN OF CARE
PT: Order received, appreciated. Pt may only have needs for one therapy discipline and OT has been following pt. OT will work with pt again tomorrow to continue to assess pt's functional mobility and determine if pt has PT needs. PT will hold for today.

## 2020-09-09 NOTE — PLAN OF CARE
D-S/P heart transplant on 05/18/20. Readmitted on 08/31/20 with SOB, cough and fever.  New RUL consolidation. Transbronchial biopsy done today. On initial assessment, pt was lethargic, tachycardic, axillary temp 100.3. Team notified of elevated temperature and tachycardia. Pt had one episode of productive cough with blood tinged sputum, otherwise dry, harsh cough noted. On return from the bathroom pt was dyspneic. O2 saturations 85% on room air. Oxiplus mask with 02 increased to 5l until pt recovered, now at 3l. Observed pt snoring when sleeping. Having diarrhea.  I-500 ml fluid flush given per order. Tylenol given. Bed alarm activated due to lethargy.  A-Results of transbronchial biopsy pending.  P-Continue with current poc. Maintain safe environment.

## 2020-09-10 LAB
ACID FAST STN SPEC QL: NORMAL
ANION GAP SERPL CALCULATED.3IONS-SCNC: 11 MMOL/L (ref 3–14)
BACTERIA SPEC CULT: NORMAL
BACTERIA SPEC CULT: NORMAL
BUN SERPL-MCNC: 23 MG/DL (ref 7–30)
C COLI+JEJUNI+LARI FUSA STL QL NAA+PROBE: NOT DETECTED
CALCIUM SERPL-MCNC: 8.7 MG/DL (ref 8.5–10.1)
CHLORIDE SERPL-SCNC: 100 MMOL/L (ref 94–109)
CO2 SERPL-SCNC: 20 MMOL/L (ref 20–32)
COPATH REPORT: NORMAL
CREAT SERPL-MCNC: 1.52 MG/DL (ref 0.66–1.25)
EC STX1 GENE STL QL NAA+PROBE: NOT DETECTED
EC STX2 GENE STL QL NAA+PROBE: NOT DETECTED
ENTERIC PATHOGEN COMMENT: NORMAL
ERYTHROCYTE [DISTWIDTH] IN BLOOD BY AUTOMATED COUNT: 13.5 % (ref 10–15)
GFR SERPL CREATININE-BSD FRML MDRD: 50 ML/MIN/{1.73_M2}
GLUCOSE SERPL-MCNC: 131 MG/DL (ref 70–99)
HCT VFR BLD AUTO: 25.8 % (ref 40–53)
HGB BLD-MCNC: 8.1 G/DL (ref 13.3–17.7)
MAGNESIUM SERPL-MCNC: 1.7 MG/DL (ref 1.6–2.3)
MCH RBC QN AUTO: 27.3 PG (ref 26.5–33)
MCHC RBC AUTO-ENTMCNC: 31.4 G/DL (ref 31.5–36.5)
MCV RBC AUTO: 87 FL (ref 78–100)
NOROV GI+II ORF1-ORF2 JNC STL QL NAA+PR: NOT DETECTED
PLATELET # BLD AUTO: 371 10E9/L (ref 150–450)
POSACONAZOLE SERPL-MCNC: 1.6 UG/ML (ref 0.7–5)
POTASSIUM SERPL-SCNC: 4 MMOL/L (ref 3.4–5.3)
RBC # BLD AUTO: 2.97 10E12/L (ref 4.4–5.9)
RVA NSP5 STL QL NAA+PROBE: NOT DETECTED
SALMONELLA SP RPOD STL QL NAA+PROBE: NOT DETECTED
SHIGELLA SP+EIEC IPAH STL QL NAA+PROBE: NOT DETECTED
SODIUM SERPL-SCNC: 131 MMOL/L (ref 133–144)
SPECIMEN SOURCE: NORMAL
TACROLIMUS BLD-MCNC: 11.5 UG/L (ref 5–15)
TME LAST DOSE: NORMAL H
V CHOL+PARA RFBL+TRKH+TNAA STL QL NAA+PR: NOT DETECTED
WBC # BLD AUTO: 6.8 10E9/L (ref 4–11)
Y ENTERO RECN STL QL NAA+PROBE: NOT DETECTED

## 2020-09-10 PROCEDURE — 25000132 ZZH RX MED GY IP 250 OP 250 PS 637: Performed by: INTERNAL MEDICINE

## 2020-09-10 PROCEDURE — 25000132 ZZH RX MED GY IP 250 OP 250 PS 637: Performed by: NURSE PRACTITIONER

## 2020-09-10 PROCEDURE — 25000128 H RX IP 250 OP 636: Performed by: NURSE PRACTITIONER

## 2020-09-10 PROCEDURE — 25000132 ZZH RX MED GY IP 250 OP 250 PS 637: Performed by: STUDENT IN AN ORGANIZED HEALTH CARE EDUCATION/TRAINING PROGRAM

## 2020-09-10 PROCEDURE — 85027 COMPLETE CBC AUTOMATED: CPT | Performed by: NURSE PRACTITIONER

## 2020-09-10 PROCEDURE — 80197 ASSAY OF TACROLIMUS: CPT | Performed by: NURSE PRACTITIONER

## 2020-09-10 PROCEDURE — 87506 IADNA-DNA/RNA PROBE TQ 6-11: CPT | Performed by: NURSE PRACTITIONER

## 2020-09-10 PROCEDURE — 99232 SBSQ HOSP IP/OBS MODERATE 35: CPT | Performed by: NURSE PRACTITIONER

## 2020-09-10 PROCEDURE — 80048 BASIC METABOLIC PNL TOTAL CA: CPT | Performed by: NURSE PRACTITIONER

## 2020-09-10 PROCEDURE — 83735 ASSAY OF MAGNESIUM: CPT | Performed by: NURSE PRACTITIONER

## 2020-09-10 PROCEDURE — 36415 COLL VENOUS BLD VENIPUNCTURE: CPT | Performed by: NURSE PRACTITIONER

## 2020-09-10 PROCEDURE — 21400000 ZZH R&B CCU UMMC

## 2020-09-10 PROCEDURE — 25000131 ZZH RX MED GY IP 250 OP 636 PS 637: Performed by: NURSE PRACTITIONER

## 2020-09-10 RX ORDER — TACROLIMUS 0.5 MG/1
0.5 CAPSULE ORAL
Status: DISCONTINUED | OUTPATIENT
Start: 2020-09-10 | End: 2020-09-16

## 2020-09-10 RX ORDER — TACROLIMUS 0.5 MG/1
0.5 CAPSULE ORAL
Status: DISCONTINUED | OUTPATIENT
Start: 2020-09-11 | End: 2020-09-10

## 2020-09-10 RX ORDER — HEPARIN SODIUM 5000 [USP'U]/.5ML
5000 INJECTION, SOLUTION INTRAVENOUS; SUBCUTANEOUS EVERY 8 HOURS
Status: DISCONTINUED | OUTPATIENT
Start: 2020-09-10 | End: 2020-09-14

## 2020-09-10 RX ORDER — LOPERAMIDE HCL 2 MG
2 CAPSULE ORAL 4 TIMES DAILY PRN
Status: DISCONTINUED | OUTPATIENT
Start: 2020-09-10 | End: 2020-09-24 | Stop reason: HOSPADM

## 2020-09-10 RX ADMIN — TACROLIMUS 0.5 MG: 0.5 CAPSULE ORAL at 08:10

## 2020-09-10 RX ADMIN — Medication 25 MG: at 14:38

## 2020-09-10 RX ADMIN — MYCOPHENOLATE MOFETIL 500 MG: 500 TABLET ORAL at 17:53

## 2020-09-10 RX ADMIN — HEPARIN SODIUM 5000 UNITS: 5000 INJECTION, SOLUTION INTRAVENOUS; SUBCUTANEOUS at 20:47

## 2020-09-10 RX ADMIN — POTASSIUM CHLORIDE 20 MEQ: 750 TABLET, EXTENDED RELEASE ORAL at 14:38

## 2020-09-10 RX ADMIN — MYCOPHENOLATE MOFETIL 500 MG: 500 TABLET ORAL at 08:10

## 2020-09-10 RX ADMIN — VALGANCICLOVIR 450 MG: 450 TABLET, FILM COATED ORAL at 08:10

## 2020-09-10 RX ADMIN — Medication 1 TABLET: at 17:53

## 2020-09-10 RX ADMIN — LIDOCAINE 1 PATCH: 560 PATCH PERCUTANEOUS; TOPICAL; TRANSDERMAL at 20:43

## 2020-09-10 RX ADMIN — CEFDINIR 300 MG: 300 CAPSULE ORAL at 20:47

## 2020-09-10 RX ADMIN — TACROLIMUS 0.5 MG: 0.5 CAPSULE ORAL at 17:53

## 2020-09-10 RX ADMIN — Medication 1 TABLET: at 08:09

## 2020-09-10 RX ADMIN — LOPERAMIDE HYDROCHLORIDE 2 MG: 2 CAPSULE ORAL at 12:13

## 2020-09-10 RX ADMIN — INSULIN GLARGINE 28 UNITS: 100 INJECTION, SOLUTION SUBCUTANEOUS at 22:08

## 2020-09-10 RX ADMIN — MAGNESIUM SULFATE IN WATER 2 G: 40 INJECTION, SOLUTION INTRAVENOUS at 14:39

## 2020-09-10 RX ADMIN — Medication 25 MG: at 22:11

## 2020-09-10 RX ADMIN — POSACONAZOLE 300 MG: 100 TABLET, DELAYED RELEASE ORAL at 08:10

## 2020-09-10 RX ADMIN — LOPERAMIDE HYDROCHLORIDE 2 MG: 2 CAPSULE ORAL at 16:20

## 2020-09-10 RX ADMIN — Medication 25 MG: at 08:10

## 2020-09-10 RX ADMIN — CEFDINIR 300 MG: 300 CAPSULE ORAL at 08:10

## 2020-09-10 RX ADMIN — ROSUVASTATIN CALCIUM 10 MG: 10 TABLET, FILM COATED ORAL at 22:08

## 2020-09-10 RX ADMIN — HEPARIN SODIUM 5000 UNITS: 5000 INJECTION, SOLUTION INTRAVENOUS; SUBCUTANEOUS at 12:13

## 2020-09-10 RX ADMIN — ASPIRIN 81 MG CHEWABLE TABLET 81 MG: 81 TABLET CHEWABLE at 08:09

## 2020-09-10 RX ADMIN — ACETAMINOPHEN 975 MG: 325 TABLET, FILM COATED ORAL at 22:15

## 2020-09-10 RX ADMIN — INSULIN ASPART 4 UNITS: 100 INJECTION, SOLUTION INTRAVENOUS; SUBCUTANEOUS at 10:22

## 2020-09-10 ASSESSMENT — ACTIVITIES OF DAILY LIVING (ADL)
ADLS_ACUITY_SCORE: 16

## 2020-09-10 NOTE — PROGRESS NOTES
[facility] INFECTIOUS DISEASES PROGRESS NOTE     Patient:  Mariusz Sharp   YOB: 1960  Date of Visit:  09/10/2020  Date of Admission: 8/31/2020  Consult Requester:Nasreen Nielson, *          Assessment and Recommendations:   Recommendations:  - Continue cefdinir to treat previously diagnosed prostatitis with end date of 10/1/20.  - Continue PO Posaconazole 300 mg daily. Posaconazole level was 1.6 on 9/9, goal is >1.0.   - Continue to follow pending infectious work-up.  - If patient is discharged, ID will chart review labs in 1 week. He does not need follow up in ID clinic.  - Follow up with Cardiology as scheduled.    Thank you for the consult. Transplant ID will continue to follow with you.     Jovana Ragsdale PA-C   Infectious Diseases  Pager: [phone]    Discussion:  Mariusz Sharp is a 59 year old male with PMHx of ICM s/p LVAD and subsequent OHT 5/18/2020 who was admitted on 8/31 with fever and cough. CT revealed R hilar opacity concerning for infection vs possible malignancy.    # RUL and right hilum mass vs consolidation c/f infection vs malignancy.   # Low positive fungitell at 90, prior 85 on 8/19  Patient presented with fevers of 101.5 at home, worsening persistent cough associated with syncope and progressive dyspnea and fatigue, although remains on room air. CXR was clear but due to symptoms, we ordered a CT chest which shows RUL and right hilum mass vs consolidations with narrowing of multiple RUL bronchi c/f either an infectious process vs malignancy including PTLD. He has had a low positive fungitell on 8/19 and 8/31--unclear significance. However, his symptoms and CT chest findings are somewhat c/f for atypical infections, especially fungal etiologies (as well as AFB and other atypical bacteria). As such he was started on anti-fungal coverage pending work up. Malignancy/ PTLD also possible but EBV PCR in serum is negative, and FNA and surgical pathology are  both negative for malignancy from EBUS.     EBUS completed 9/8. Cytology shows rare pseudohyphae on GMS stain, but surgical pathology GMS stain did not reveal fungal organisms. Gram stain was without organisms and aerobic culture shows normal simona.    Additional studies remain pending including fungal DNA 28S, fungus culture, AFB stain/culture, nocardia culture,     Other infectious work up negative thus far including COVID-19 testing, serum aspergillus Ag, serum CMV and EBV PCRs, Urine strep and legionella and blood cx. Unable able to collect adequate sputum sample for bacterial Cx, but PCP PCR negative. Fungal antibodies are also negative.    # Prostatitis  Found during 8/18-8/21 admission. Imaging showed enlarged prostate-this with elevated prostate and urine sediment was c/f chronic prostatitis/UTI. He was treated with ceftriaxone and azithromycin and discharged on cefdinir for 6 weeks (end 10/1).    # Mild diarrhea  Stable during admission, 2-4 occurences recorded per day. Continue to monitor.    # ICM s/p LVAD  And subsequent OHT 5/18/20  - CMV D+/R-, EBV D-/R+, HSV1?/2?, VZV +, Toxo D-/R-  - Maintenance immunosuppression-cellcept, tacrolimus, pred    # CKD - stable  # DMII - stable       Prior ID Issues:  - Human rhinovirus/enterovirus: Hospitalized 8/18/20-8/21/20 with CAP (fevers/cough/dyspnea, but on room air). CXR showed retrocardiac opacity. COVID-19 was negative. ID was consulted. Infectious work up was positive for human rhinovirus/enterovirus.  - Donor Blood Cx w/ S anginosus and Veillonella sp in 1/2 sets on 5/16/20 (suspected contaminant)- s/p 7 days antibiotics    Other ID issues:  - QTc interval:  465 msec 9/7   - Bacterial prophylaxis: currently on cefdinir due to h/o prostatitis   - Pneumocystis prophylaxis:  none, dc'd as off prednisone. Prior Inhaled pentamidine-last dose 7/29/2020. Bactrim was discontinued 5/27/2020 2/2 hyperkalemia  - Viral serostatus:  CMV D+/R-, EBV D-/R+, HSV1?/2?, VZV  +, Toxo D-/R-  - Viral ppx: Valcyte  - Fungal prophylaxis:  Posaconazole related to RUL opacity  - Immunization status:    - Isolation status: good hand hygiene           Interval History:   Continues to have low grade fevers, Tmax 100.5 in last 24 hours. Mr. Sharp believes he feels a bit better today, and reports he is stronger.Denies SOB. No abdominal pain, n/v/.          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 Induction with and maintenance immunosuppression with cellcept, tacrolimus and prednisone 5 mg. Antimicrobial prophylaxis includes valganciclovir and previously with bactrim (dc'd 5/27 due to hyperkalemia-switched to pentamidine)     His PMHx is also significant for LV thrombus, CKD, and DMII     Briefly to review relevant medical history,      --Hospitalized 8/18/20-8/21/20 with CAP (fevers/cough/dyspnea, but on room air).CXR showed retrocardiac opacity. COVID-19 was negative. ID was consulted. Infectious work up was positive for human rhinovirus/enterovirus. Imaging showed enlarged prostate-this with elevated prostate and urine sediment was c/f chronic prostatitis/UTI. He was treated with ceftriaxone and azithromycin and discharged on cefdinir for 6 weeks     Patient was doing well at home until last night. He states he developed a persistent hacking dry cough spell, associated with weakness and syncope/fall hitting his forehead. He also endorses high grade fevers of 101.3, associated right otalgia and sore throat. Also endorsing diarrhea ~3 days. He does endorse progressive dyspnea since his transplant but no orthopnea/LE edema.     On admission, patient was afebrile, HDS on room air. Initial labs with wbc 4.3, procalcitonin  0.23, lactate 1.5, Cr 1.6, normal LFTs. Blood cx were drawn and patient received vanc/zosyn. CXR was unremarkable. CT head negative and renal US with no acute pathology. UA unremarkable         Review of Systems:   Targeted 5 point review of systems was negative except for noted as above the interval history section.            Current Medications:       aspirin  81 mg Oral Daily     calcium carbonate 600 mg-vitamin D 400 units  1 tablet Oral BID w/meals     cefdinir  300 mg Oral Q12H CHANCE     heparin ANTICOAGULANT  5,000 Units Subcutaneous Q8H     hydrALAZINE  25 mg Oral TID     insulin aspart   Subcutaneous Daily with lunch     insulin aspart  1-10 Units Subcutaneous TID w/meals     insulin aspart  1-7 Units Subcutaneous At Bedtime     insulin aspart   Subcutaneous QAM AC     insulin aspart   Subcutaneous Daily with supper     insulin glargine  28 Units Subcutaneous At Bedtime     lidocaine  1 patch Transdermal Q24h    And     lidocaine   Transdermal Q8H     mycophenolate  500 mg Oral BID IS     posaconazole  300 mg Oral Daily     rosuvastatin  10 mg Oral At Bedtime     sodium chloride (PF)  3 mL Intracatheter Q8H     [START ON 9/11/2020] tacrolimus  0.5 mg Oral QAM     valGANciclovir  450 mg Oral Daily              Physical Exam:   Vitals were reviewed  Temp:  [97.9  F (36.6  C)-100.5  F (38.1  C)] 98  F (36.7  C)  Pulse:  [110-132] 120  Resp:  [16-30] 30  BP: (102-152)/() 102/71  SpO2:  [95 %-98 %] 95 %    Vitals:    09/04/20 0548 09/05/20 0500 09/07/20 0239 09/08/20 0054   Weight: 79.2 kg (174 lb 8 oz) 80 kg (176 lb 4.8 oz) 77.7 kg (171 lb 6.4 oz) 78.9 kg (174 lb)    09/09/20 0445   Weight: 79.2 kg (174 lb 8 oz)       Constitutional: Adult male seen lying in bed, in NAD.   HEENT: NC/AT, EOMI, sclera clear, conjunctiva normal, OP with MMM  Respiratory: No increased work of breathing, CTAB  Cardiovascular: RRR, no murmur noted. No peripheral edema.  GI: non-distended, soft, +bowel sounds  Skin: Warm, dry,  well-perfused.   Neurologic: A&O. Answers questions appropriately, speech normal.   Neuropsychiatric: Calm. Affect appropriate to situation.           Laboratory Data:   Microbiology:  9/10/20 Norovirus not deected  20 Fungus BC in process  20 Blood culture NGTD  20 C diff negative  20 BAL aerobic culture normal resp simona  20 Blood Cx NGTD  20 serum cryptococcal Ag - negative  20 urine histoplasma Ag - negative  20 urine and serum blastomyces Ag - negative  20 Sputum PCP PCR negative  20 Urine legionella Ag negative  20 Serum CMV PCR negative  20 Serum EBV PCR negative  20 Serum cryptococcal Ag negative  20 Urine strep Ag negative  20 Serum Fungitell 90  20 Serum Aspergillus A.05  20 COVID-19 negative  20 Resp Viral panel negative  20 Blood Cx: NGTD     Prior Micro:  20 Urine Cx: NG  20 Sputum Cx: Normal Simona  20 Urine Cx: NG  20 Serum Fungitell 86  20 Resp viral panel + Human Rhinovirus/Enterovirus  20 Blood Cx: NG  20 Blood Cx: NG      Metabolic Studies       Recent Labs   Lab Test 09/10/20  0546 20  0535 20  0548  20  0658  20  0939  20  0558  06/15/20  0826   * 130* 130*   < > 134   < > 137   < > 139   < > 139   POTASSIUM 4.0 4.1 4.0   < > 3.8   < > 4.1   < > 3.6   < > 4.8   CHLORIDE 100 100 99   < > 104   < > 105   < > 107   < > 110*   CO2 20 20 22   < > 22   < > 25   < > 25   < > 21   ANIONGAP 11 9 9   < > 9   < > 7   < > 6   < > 8   BUN 23 24 22   < > 21   < > 41*   < > 27   < > 98*   CR 1.52* 1.59* 1.39*   < > 1.38*   < > 1.91*   < > 1.31*   < > 1.89*   GFRESTIMATED 50* 47* 56*   < > 56*   < > 38*   < > 60*   < > 38*   * 163* 171*   < > 118*   < > 164*   < > 69*   < > 120*   A1C  --   --   --   --   --   --   --   --  7.4*   < > 7.8*   ARLENE 8.7 8.8 9.1   < > 8.5   < > 9.3   < > 8.4*   < > 9.0   PHOS  --   --   --   --   --   --  3.0  --   --    <  > 4.4   MAG 1.7 1.8 1.8   < > 1.4*  --  1.7  --   --    < > 1.7   LACT  --   --   --   --  0.9   < >  --   --   --    < >  --    CKT  --   --   --   --   --   --   --   --   --   --  132    < > = values in this interval not displayed.       Hepatic Studies    Recent Labs   Lab Test 09/05/20  0549  09/01/20  0658   BILITOTAL 0.8   < > 0.9   ALKPHOS 68   < > 62   ALBUMIN 2.5*   < > 2.4*   AST 22   < > 16   ALT 15   < > 11   LDH  --   --  228*    < > = values in this interval not displayed.       Pancreatitis testing    Recent Labs   Lab Test 09/01/20  0658  05/18/20  0857   AMYLASE  --   --  56   TRIG 143   < >  --     < > = values in this interval not displayed.       Hematology Studies      Recent Labs   Lab Test 09/10/20  0546 09/09/20  0535 09/08/20  0548  09/05/20  0549 09/04/20  0543 09/03/20  0526   WBC 6.8 6.0 7.1   < > 5.3 3.8* 3.1*   ANEU  --   --   --   --  4.1 3.1 2.4   ALYM  --   --   --   --  0.8 0.3* 0.3*   MARTHA  --   --   --   --  0.3 0.2 0.2   AEOS  --   --   --   --  0.0 0.2 0.1   HGB 8.1* 7.6* 8.4*   < > 8.3* 8.7* 8.3*   HCT 25.8* 24.4* 26.6*   < > 26.5* 27.5* 27.0*    340 361   < > 353 376 333    < > = values in this interval not displayed.       Urine Studies     Recent Labs   Lab Test 08/31/20  1615   URINEPH 5.5   NITRITE Negative   LEUKEST Negative   WBCU 1       Vancomycin Levels     Recent Labs   Lab Test 05/27/20  0837 05/24/20  1726 05/22/20  1704   VANCOMYCIN 26.6* 11.9 12.5       Hepatitis B Testing   Recent Labs   Lab Test 05/15/20  0559   HBCAB Nonreactive   HEPBANG Nonreactive       Hepatitis C Testing     Hepatitis C Antibody   Date Value Ref Range Status   05/15/2020 Nonreactive NR^Nonreactive Final     Comment:     Assay performance characteristics have not been established for newborns,   infants, and children     12/04/2019 Nonreactive NR^Nonreactive Final     Comment:     Assay performance characteristics have not been established for newborns,   infants, and children          Last check of C difficile  C Diff Toxin B PCR   Date Value Ref Range Status   09/09/2020 Negative NEG^Negative Final     Comment:     Negative: C. difficile target DNA sequences NOT detected, presumed negative   for C.difficile toxin B or the number of bacteria present may be below the   limit of detection for the test.  FDA approved assay performed using [employer] GeneXpert real-time PCR.  A negative result does not exclude actual disease due to C. difficile and may   be due to improper collection, handling and storage of the specimen or the   number of organisms in the specimen is below the detection limit of the assay.              Imaging:   Exam: XR CHEST PORT 1 VW, 9/7/2020 5:30 AM                                       Impression:   1. No acute cardiopulmonary findings.  2. Less prominent right superior hilar opacity.    EXAMINATION: CT CHEST W/O CONTRAST, 9/2/2020 4:23 PM                                       IMPRESSION:      1. New mass vs consolidation in the right upper lobe and right hilum  since 7/29/2020 with narrowing of multiple right upper lobe bronchi.   1a. Differential pneumonia with reactive right hilar lymphadenopathy  vs. malignancy(ex. Lymphoma/PTLD) with post obstructive pneumonia.     2. Stable additional pulmonary lesions as above.     3. Stable postsurgical changes of heart transplant.     4. Cholelithiasis without evidence of cholecystitis.

## 2020-09-10 NOTE — PLAN OF CARE
D: pt admitted 8/31/20 with SOB, cough, and fever. Pt has mass in RUL s/p biopsy on 9/8. Pt has history of heart transplant.     I: Pain controlled with lidocaine patch on back. Pt on bed alarm. Multiple loose brown stools quick onset. Being tested for C-dif; on enteric precautions.    A: AOx4, SBA, Lethargic and sleepy. Temp 100.3; recheck later in shift 99.8; pt declined tylenol. 's-130, BP: 127-159/90-99. O2 on room air 80-92%; with NC 93-97%. 3L NC. Appetite is poor.     P: Continue with plan of care and contact Cards 2 with questions.

## 2020-09-10 NOTE — PROGRESS NOTES
Transplant Social Work Services Progress Note      Date of Initial Social Work Evaluation: 7/17/18.   Admission assessment completed 9/1/2020  Collaborated with: Cards 2, [facility] Liaison, RNCC and pt     Data: SW following for support and discharge planning. Pt working with therapies and now recommending TCU w/ potential to progress to home pending LOS. Pt was offered choice and preferred to go to [facility]. Discussed with [facility] and upon insurance check pt does not have coverage for TCU placement.   Discussed with RNCC and will f/u regarding potential need for HHC and new home O2 needs    Intervention: Supportive Visit, Discharge Planning     Assessment: Pt reports he continues to not feel well and does not look well. Writer had met w/ pt and discussed TCU prior to finding out that he did not have insurance coverage. Attempted to go back in and update pt but he was sleeping. RNCC will meet w/ him and introduce self and offer HHC services if needed, today or tomorrow.   Education provided by SW: Ongoing Social Work support, discharge planning   Plan:    Discharge Plans in Progress: Pt does not have insurance coverage for TCU    Barriers to d/c plan: Medical Readiness    Follow up Plan: SW to continue to follow for support.

## 2020-09-10 NOTE — PROGRESS NOTES
"                          Diabetes Consult Daily  Progress Note          Assessment/Plan:   Mariusz Sharp \"Red\" is a 59 year old male with history of CAD, LV thrombus, CKD Stage III, PAF, DM Type II, and ICM s/p HM III LVAD as BTT (2018) with subsequent OHT 5/18/20, presenting with progressive SOB, cough, and fever, found to have RUL mass.   Diabetes service consulted for persistent hyperglycemia, which seemed to be most related to insufficient/absent aspart for carbohydrates.       Glucose control is fair    Plan  - continue aspart 1 unit per 6.5 grams carbohydrate for meals and PRN snacks--  - continue aspart high resistance qAC, HS   - continue glargine 28 units at HS  - BG monitor qAC, HS 0200  - if appetite were to improve, could discuss GLP-1 RA again (previously needed a prior authorization for this class-- unknown if any changes to insurance)  - resume Dexcom when able to get another sensor         Outpatient diabetes follow up: [facility] Jeannette Schafer PA-C  Plan discussed with RN           Interval History:     The last 24 hours progress and nursing notes reviewed.   Appetite remains decreased.  Creatinine about the same today.  Maybe feeling a bit better.   On Enteric precautions now pending stool panel results    Recent Labs   Lab 09/10/20  1217 09/10/20  0953 09/10/20  0806 09/10/20  0546 09/09/20  2144 09/09/20  1526 09/09/20  1213  09/09/20  0535  09/08/20  0548  09/07/20  0523  09/06/20  0429  09/05/20  1403   GLC  --   --   --  131*  --   --   --   --  163*  --  171*  --  266*  --  234*  --  186*   * 166* 167*  --  204* 155* 161*   < >  --    < >  --    < >  --    < >  --    < >  --     < > = values in this interval not displayed.           Nutrition:     Orders Placed This Encounter      Consistent Carbohydrate Diet 8521-6112 Calories: Moderate Consistent CHO (4-6 CHO units/meal)    Supplements: Boost Glucose control (had Boost Plus before)        PTA Regimen:     4-5 x/day + BG monitoring " "frequency-- Dexcom G6  Lantus 32 units at HS  Novolog 1 units per 8 grams carb  Novolog High (1 per 25) resistance scale             Review of Systems:   See interval hx          Medications:   NO steroid at present         Physical Exam:     Gen: Alert, resting in bed, in NAD   HEENT: NC/AT, oral MMM hearing intact to conversational volume  Resp: Unlabored at rest, no cough observed, off supp O2  Neuro: oriented x3, communicating clearly  Psych: calm even mood  /82   Pulse 120   Temp 98  F (36.7  C) (Oral)   Resp 30   Ht 1.626 m (5' 4\")   Wt 79.2 kg (174 lb 8 oz)   SpO2 95%   BMI 29.95 kg/m               Data:     Lab Results   Component Value Date    A1C 7.4 08/19/2020    A1C 7.2 08/13/2020    A1C 7.8 06/15/2020    A1C 8.0 05/12/2020    A1C 9.5 07/05/2019            Recent Labs   Lab Test 09/10/20  0546 09/09/20  0535   * 130*   POTASSIUM 4.0 4.1   CHLORIDE 100 100   CO2 20 20   ANIONGAP 11 9   * 163*   BUN 23 24   CR 1.52* 1.59*   ARLENE 8.7 8.8     CBC RESULTS:   Recent Labs   Lab Test 09/10/20  0546   WBC 6.8   RBC 2.97*   HGB 8.1*   HCT 25.8*   MCV 87   MCH 27.3   MCHC 31.4*   RDW 13.5                Paola Triplett APRN [facility] [phone]  Diabetes Management job code [id number]                  "

## 2020-09-10 NOTE — PROGRESS NOTES
[facility]   Cardiology II Service / Advanced Heart Failure  Daily Progress Note      Patient: Mariusz Sharp  MRN: [id number]  Admission Date: 8/31/2020  Hospital Day # 10    Assessment and Plan: Mariusz Sharp is a 59 year old male with history of CAD, LV thrombus, CKD Stage III, PAF, DM Type II, and ICM s/p HM III LVAD as BTT with subsequent OHT 5/18/20. He presented with progressive SOB, cough, and fever, found to have RUL mass.     Today's Plan:  - f/u BAL cultures, tissue studies, blood cultures  - f/u tacrolimus level this PM  - f/u posaconazole level  - f/u enteric panel, ok for Imodium     #Fever with cough secondary to RUL mass. Tmax 102 prior to admission. History of recent rhinovirus. CDIF, LA, UA, COVID, respiratory viral panel Legionella, Cryptococcus, Strep pneumo negative, Aspergillus, CMV, and EBV all negative. Renal US notes resolution of prior stone. Chest X-ray negative. History of PAcnes to outflow graft and Donor positive S Anginosus and Veillonella (completed course of antibiotics). Concern for Chronic Prostatitis. Vanco/Zosyn discontinued 9/1. CT Chest positive for RUL mass concerning for PTLD vs infection. Note symptoms worse following cessation of antibiotics. Continues to have breakthrough fevres.   - Transplant ID following, appreciate recs.    - Beta D glucan 90 and trending upward   - started on posaconazole 9/4, 300 mg daily    - posaconazole level pending, goal >1  - Blastomyces, histoplasma, aspergillus PCR negative  - PJP negative 9/6/20   - Aggressive pulmonary toileting and nebs   - BAL and EBUS 9/8 with IR pulm   -- cytology negative for malignancy, rare pseudohyphae noted, PCP absent               -- tissue for histopathology, AFB, bacterial and fungal stains pending               -- tissue for bacterial, AFB and fungal cultures pending  - blood cultures 9/7, 9/9 no growth to date  - Continue Cefdinir 300 mg BID through 10/1/20 for prostatitis      # Status post  OHT on 5/18/20, history of NICM and LVAD  # Chronic immunosuppression  Echo 8/31 with EF 70% and normal graft function. mRA-3, RV-20/2, mPA-15, mPCWP-10, AMRIT CO-4.91, AMRIT CI-2.63 (weight 178 lb), biopsy negative.     Graft Function:   --Volume: euvolemic to hypovolemic, intermittent IVF, encourage PO  --BP: 113-148/80s-90s on hydralazine 25 mg tid (HOLD for sBP<100)  --HR: 110-120s 2/2 infection     Immunosuppression:   --recently off prednisone per taper protocol  --Cellcept decreased to 500 mg BID 9/8 given low Immuknow  --tacrolimus 0.5 mg bid, trough pending today, goal 8-10 (11.6 yesterday), daily troughs for now   --Immuknow 9/3 33    Serostatus: CMV: D+, R-, EBV: D- R+, Toxo D- R-    Prophylaxis:   --CAV: aspirin 81 mg and rosuvastatin 10 mg daily  --Thrush: on posaconazole  --PCP: Bactrim caused hyperK, treated with pentamidine, no longer indicated as off prednisone  --GI: Protonix   --Osteoporosis: calcium/vitamin D   --CMV: D+/R-, Valcyte renally dosed 450 mg daily x 6 mos (through November)    #ANDREW  #Hyponatremia likely hypovolemic  Cr 1.4 from 1.1 - suspect prerenal ANDREW +supratherpeutic tac level. Na 127, improved with IVF suggesting due to hypovolemia.  - intermittent NS 500mL if poor PO intake    #HTN. Prior to admission Norvasc on hold.  - hydralazine 25 mg TID, HOLD for sBP<100 prior to giving  - monitor     #Syncope, resolved. Occurred with cough prior to admission. MAPs stable.   - aggressive pulmonary toileting and nebs.      #Diarrhea, intermittent.  Cdif and CMV negative on admission. Resolved then increased again 9/9. norovirus and cdif negative.    - full enteric panel is pending     #DM Type II.   - Lantus 28 units at HS.   - Novolog carb coverage and ssi, per endocrine    #Elevated PSA with Concern for Chronic Prostatitis. PSA 8.21. MRI consistent with BPH.   - Cefdinir 300 mg BID through 10/1/20    FEN: diabetic diet  PROPHY:  subQ heparin, PPI  LINES:  PIV  DISPO:  Pending study results,  "likely to TCU  CODE STATUS:  Full code    Dinora Leroy, LEX, NP-C  Advanced Heart Failure/Cardiology II Service  Pager [phone] [facility] [id number]    ================================================================    Subjective/24-Hr Events:   Last 24 hr care team notes reviewed. Had fever again overnight with more loose stools. Blood cultures sent again and stool studies. This morning says he is feeling a bit better. Still very little energy and poor PO intake. OT saw yesterday and recommending TCU.     ROS:  4 point ROS including respiratory, CV, GI and  (other than that noted in the HPI) is negative.     Medications: Reviewed in EPIC.     Physical Exam:   /87 (BP Location: Left arm)   Pulse 110   Temp 98.2  F (36.8  C) (Oral)   Resp 16   Ht 1.626 m (5' 4\")   Wt 79.2 kg (174 lb 8 oz)   SpO2 97%   BMI 29.95 kg/m      GENERAL: Appears comfortable, in no distress, fatigued.  HEENT: Eye symmetrical, no discharge or icterus bilaterally. Mucous membranes moist and without lesions.  NECK: Supple, JVD not visible at 45 degrees.   CV: Tachycardic and regular, +S1S2, no murmur, rub, or gallop.   RESPIRATORY: Respirations regular, even, and unlabored. Lungs CTA throughout. No crackles or wheezes.  GI: Soft, obese and non distended with normoactive bowel sounds present in all quadrants. No tenderness, rebound, guarding.   EXTREMITIES: No peripheral edema. 2+ bilateral pedal pulses.   NEUROLOGIC: Alert and oriented x 3. No focal deficits.   MUSCULOSKELETAL: No joint swelling or tenderness.   SKIN: No jaundice. No rashes or lesions.     Labs:  CMP  Recent Labs   Lab 09/10/20  0546 09/09/20  0535 09/08/20  0548 09/07/20  0523  09/05/20  0549 09/04/20  0543   * 130* 130* 127*   < > 128* 134   POTASSIUM 4.0 4.1 4.0 4.1   < > 4.0 3.9   CHLORIDE 100 100 99 98   < > 99 104   CO2 20 20 22 21   < > 20 22   ANIONGAP 11 9 9 9   < > 8 8   * 163* 171* 266*   < > 206* 115*   BUN 23 24 22 25   < > 22 17   CR 1.52* " 1.59* 1.39* 1.44*   < > 1.41* 1.16   GFRESTIMATED 50* 47* 56* 53*   < > 55* 69   GFRESTBLACK 58* 55* 64 62   < > 63 80   ARLENE 8.7 8.8 9.1 8.9   < > 8.6 9.0   MAG 1.7 1.8 1.8 1.8   < > 1.7 1.7   PROTTOTAL  --   --   --   --   --  6.6* 6.5*   ALBUMIN  --   --   --   --   --  2.5* 2.6*   BILITOTAL  --   --   --   --   --  0.8 0.8   ALKPHOS  --   --   --   --   --  68 66   AST  --   --   --   --   --  22 21   ALT  --   --   --   --   --  15 12    < > = values in this interval not displayed.       CBC  Recent Labs   Lab 09/10/20  0546 09/09/20  0535 09/08/20  0548 09/07/20  0523   WBC 6.8 6.0 7.1 6.8   RBC 2.97* 2.80* 3.09* 2.95*   HGB 8.1* 7.6* 8.4* 8.2*   HCT 25.8* 24.4* 26.6* 26.2*   MCV 87 87 86 89   MCH 27.3 27.1 27.2 27.8   MCHC 31.4* 31.1* 31.6 31.3*   RDW 13.5 13.7 13.8 13.7    340 361 341         Time/Communication  I personally spent a total of 25 minutes. Of that 15 minutes was counseling/coordination of patient's care. Plan of care discussed with patient. See my note above for details.     Patient discussed with Dr. Ortiz.

## 2020-09-10 NOTE — PLAN OF CARE
OT: pt declined therapy at this time as he reports he did not sleep well over night and having diarrhea this AM.     Re-attempted in PM and pt declined stating he just returned to bed and wanting to rest despite encouragment, will re-schedule.

## 2020-09-10 NOTE — PLAN OF CARE
D: Patient admitted 8/31 w/ progressive shortness of breath, cough, and fever. Hx. HTN, CAD, CKD3, PAF, DM2, ICM s/p LVAD HM3 w/ subsequent OHT 5/18/20.  I/A: A&Ox4. VSS except continues to have intermittent low grade fevers. On 3L oxymask overnight. RADER. ST 110s-130s. C/o generalized aching partially relieved by prn tylenol. Lido patch in place on mid upper back. Deferred BG overnight. Adequate uop. Continues to have loose stools. SBA. Appeared to rest comfortably overnight.   P: Continue to monitor and notify Cards 2 with questions/concerns.

## 2020-09-10 NOTE — PLAN OF CARE
"D: Admit 8/31 with SOB, cough, fever. PMH pertinent for HTN, CAD, CKD, PAF, DM2, ICM s/p LVAD HM3 with subsequent heart tx 5/18/20.    I/A: A & O x4. Afebrile. BP post activity 140s/100 this am. BPs lying consistently 110s/70-80s. On 3L NC while sleeping, RA while awake. Reports being very \"fatigued and exhausted\" today. RADER, getting to the commode is exhaustive for him. States he felt slightly better this am, but has since \"not felt like myself\". Denies pain. Many diarrhea episodes. CDiff has been (-), but did resend an enteric panel stool sample this afternoon. Remains enteric precautions. Imodium given x2, PRN order available for QID. After second dose, this seemed to help. Voiding in urinal. Up with SBA. R PIV SL, running abx. BG 160s-180s. Sliding scale given per orders. Carb coverage x1 today for a snack, but very poor appetite and only ate a few bites for dinner.     P: Pending results from BAL cultures, tissue studies, and blood cultures. Update CARDS 2 NP if questions/concerns.     aPt Patel RN  "

## 2020-09-11 ENCOUNTER — APPOINTMENT (OUTPATIENT)
Dept: OCCUPATIONAL THERAPY | Facility: CLINIC | Age: 60
End: 2020-09-11
Payer: COMMERCIAL

## 2020-09-11 ENCOUNTER — APPOINTMENT (OUTPATIENT)
Dept: PHYSICAL THERAPY | Facility: CLINIC | Age: 60
End: 2020-09-11
Attending: NURSE PRACTITIONER
Payer: COMMERCIAL

## 2020-09-11 LAB
ACID FAST STN SPEC QL: NORMAL
ANION GAP SERPL CALCULATED.3IONS-SCNC: 9 MMOL/L (ref 3–14)
BUN SERPL-MCNC: 28 MG/DL (ref 7–30)
CALCIUM SERPL-MCNC: 8.7 MG/DL (ref 8.5–10.1)
CHLORIDE SERPL-SCNC: 100 MMOL/L (ref 94–109)
CO2 SERPL-SCNC: 22 MMOL/L (ref 20–32)
CREAT SERPL-MCNC: 1.72 MG/DL (ref 0.66–1.25)
ERYTHROCYTE [DISTWIDTH] IN BLOOD BY AUTOMATED COUNT: 13.6 % (ref 10–15)
GFR SERPL CREATININE-BSD FRML MDRD: 43 ML/MIN/{1.73_M2}
GLUCOSE BLDC GLUCOMTR-MCNC: 158 MG/DL (ref 70–99)
GLUCOSE BLDC GLUCOMTR-MCNC: 237 MG/DL (ref 70–99)
GLUCOSE BLDC GLUCOMTR-MCNC: 264 MG/DL (ref 70–99)
GLUCOSE BLDC GLUCOMTR-MCNC: 307 MG/DL (ref 70–99)
GLUCOSE SERPL-MCNC: 130 MG/DL (ref 70–99)
HCT VFR BLD AUTO: 24 % (ref 40–53)
HGB BLD-MCNC: 7.5 G/DL (ref 13.3–17.7)
INTERPRETATION ECG - MUSE: NORMAL
MAGNESIUM SERPL-MCNC: 1.8 MG/DL (ref 1.6–2.3)
MCH RBC QN AUTO: 27.1 PG (ref 26.5–33)
MCHC RBC AUTO-ENTMCNC: 31.3 G/DL (ref 31.5–36.5)
MCV RBC AUTO: 87 FL (ref 78–100)
PLATELET # BLD AUTO: 368 10E9/L (ref 150–450)
POTASSIUM SERPL-SCNC: 4.2 MMOL/L (ref 3.4–5.3)
RBC # BLD AUTO: 2.77 10E12/L (ref 4.4–5.9)
SODIUM SERPL-SCNC: 131 MMOL/L (ref 133–144)
SPECIMEN SOURCE: NORMAL
TACROLIMUS BLD-MCNC: 9.6 UG/L (ref 5–15)
TME LAST DOSE: NORMAL H
WBC # BLD AUTO: 6.9 10E9/L (ref 4–11)

## 2020-09-11 PROCEDURE — 80197 ASSAY OF TACROLIMUS: CPT | Performed by: NURSE PRACTITIONER

## 2020-09-11 PROCEDURE — 99232 SBSQ HOSP IP/OBS MODERATE 35: CPT | Performed by: NURSE PRACTITIONER

## 2020-09-11 PROCEDURE — 97162 PT EVAL MOD COMPLEX 30 MIN: CPT | Mod: GP

## 2020-09-11 PROCEDURE — 97110 THERAPEUTIC EXERCISES: CPT | Mod: GO

## 2020-09-11 PROCEDURE — 97530 THERAPEUTIC ACTIVITIES: CPT | Mod: GP

## 2020-09-11 PROCEDURE — 36415 COLL VENOUS BLD VENIPUNCTURE: CPT | Performed by: NURSE PRACTITIONER

## 2020-09-11 PROCEDURE — 25000132 ZZH RX MED GY IP 250 OP 250 PS 637: Performed by: NURSE PRACTITIONER

## 2020-09-11 PROCEDURE — 00000146 ZZHCL STATISTIC GLUCOSE BY METER IP

## 2020-09-11 PROCEDURE — 83735 ASSAY OF MAGNESIUM: CPT | Performed by: NURSE PRACTITIONER

## 2020-09-11 PROCEDURE — 25000132 ZZH RX MED GY IP 250 OP 250 PS 637: Performed by: INTERNAL MEDICINE

## 2020-09-11 PROCEDURE — 80048 BASIC METABOLIC PNL TOTAL CA: CPT | Performed by: NURSE PRACTITIONER

## 2020-09-11 PROCEDURE — 25000128 H RX IP 250 OP 636: Performed by: STUDENT IN AN ORGANIZED HEALTH CARE EDUCATION/TRAINING PROGRAM

## 2020-09-11 PROCEDURE — 25000131 ZZH RX MED GY IP 250 OP 636 PS 637: Performed by: NURSE PRACTITIONER

## 2020-09-11 PROCEDURE — 97535 SELF CARE MNGMENT TRAINING: CPT | Mod: GO

## 2020-09-11 PROCEDURE — 25800030 ZZH RX IP 258 OP 636: Performed by: NURSE PRACTITIONER

## 2020-09-11 PROCEDURE — 85027 COMPLETE CBC AUTOMATED: CPT | Performed by: NURSE PRACTITIONER

## 2020-09-11 PROCEDURE — 25000132 ZZH RX MED GY IP 250 OP 250 PS 637: Performed by: STUDENT IN AN ORGANIZED HEALTH CARE EDUCATION/TRAINING PROGRAM

## 2020-09-11 PROCEDURE — 25000128 H RX IP 250 OP 636: Performed by: NURSE PRACTITIONER

## 2020-09-11 PROCEDURE — 21400000 ZZH R&B CCU UMMC

## 2020-09-11 PROCEDURE — 97110 THERAPEUTIC EXERCISES: CPT | Mod: GP

## 2020-09-11 RX ORDER — ONDANSETRON 4 MG/1
4 TABLET, FILM COATED ORAL EVERY 6 HOURS PRN
Status: DISCONTINUED | OUTPATIENT
Start: 2020-09-11 | End: 2020-09-24 | Stop reason: HOSPADM

## 2020-09-11 RX ADMIN — INSULIN GLARGINE 28 UNITS: 100 INJECTION, SOLUTION SUBCUTANEOUS at 21:05

## 2020-09-11 RX ADMIN — POSACONAZOLE 300 MG: 100 TABLET, DELAYED RELEASE ORAL at 09:28

## 2020-09-11 RX ADMIN — HEPARIN SODIUM 5000 UNITS: 5000 INJECTION, SOLUTION INTRAVENOUS; SUBCUTANEOUS at 20:55

## 2020-09-11 RX ADMIN — CEFDINIR 300 MG: 300 CAPSULE ORAL at 20:55

## 2020-09-11 RX ADMIN — Medication 1 TABLET: at 09:28

## 2020-09-11 RX ADMIN — HEPARIN SODIUM 5000 UNITS: 5000 INJECTION, SOLUTION INTRAVENOUS; SUBCUTANEOUS at 16:36

## 2020-09-11 RX ADMIN — SODIUM CHLORIDE 500 ML: 9 INJECTION, SOLUTION INTRAVENOUS at 11:01

## 2020-09-11 RX ADMIN — TACROLIMUS 0.5 MG: 0.5 CAPSULE ORAL at 09:28

## 2020-09-11 RX ADMIN — ONDANSETRON HYDROCHLORIDE 4 MG: 4 TABLET, FILM COATED ORAL at 21:19

## 2020-09-11 RX ADMIN — HEPARIN SODIUM 5000 UNITS: 5000 INJECTION, SOLUTION INTRAVENOUS; SUBCUTANEOUS at 02:53

## 2020-09-11 RX ADMIN — Medication 25 MG: at 09:37

## 2020-09-11 RX ADMIN — MAGNESIUM SULFATE IN WATER 2 G: 40 INJECTION, SOLUTION INTRAVENOUS at 09:31

## 2020-09-11 RX ADMIN — TACROLIMUS 0.5 MG: 0.5 CAPSULE ORAL at 16:36

## 2020-09-11 RX ADMIN — LIDOCAINE 1 PATCH: 560 PATCH PERCUTANEOUS; TOPICAL; TRANSDERMAL at 20:56

## 2020-09-11 RX ADMIN — MYCOPHENOLATE MOFETIL 500 MG: 500 TABLET ORAL at 09:29

## 2020-09-11 RX ADMIN — CEFDINIR 300 MG: 300 CAPSULE ORAL at 09:28

## 2020-09-11 RX ADMIN — ROSUVASTATIN CALCIUM 10 MG: 10 TABLET, FILM COATED ORAL at 21:22

## 2020-09-11 RX ADMIN — Medication 1 TABLET: at 16:36

## 2020-09-11 RX ADMIN — ASPIRIN 81 MG CHEWABLE TABLET 81 MG: 81 TABLET CHEWABLE at 09:28

## 2020-09-11 RX ADMIN — VALGANCICLOVIR 450 MG: 450 TABLET, FILM COATED ORAL at 09:29

## 2020-09-11 RX ADMIN — Medication 25 MG: at 16:37

## 2020-09-11 RX ADMIN — MYCOPHENOLATE MOFETIL 500 MG: 500 TABLET ORAL at 16:36

## 2020-09-11 RX ADMIN — Medication 25 MG: at 20:55

## 2020-09-11 RX ADMIN — ACETAMINOPHEN 975 MG: 325 TABLET, FILM COATED ORAL at 22:02

## 2020-09-11 ASSESSMENT — ACTIVITIES OF DAILY LIVING (ADL)
ADLS_ACUITY_SCORE: 15
ADLS_ACUITY_SCORE: 16
ADLS_ACUITY_SCORE: 15

## 2020-09-11 ASSESSMENT — MIFFLIN-ST. JEOR: SCORE: 1520.27

## 2020-09-11 NOTE — PLAN OF CARE
OT 6C  Discharge Planner OT   Patient plan for discharge: plans for rehab   Current status:  BP at start of session 110/68,  BPM, O2 sats at 94% on RA. Close SBA for ambulation bed <> sink. Pt stood at sink x 15 minutes with one seated rest break to complete g/h tasks. HR up to 124 BPM with standing ax, O2 sats at 88% and needed cues for PLB to return to 91%. Pt significantly limited by fatigue and SOB. BUE exercises completed in sitting with fair tolerance.   Barriers to return to prior living situation: acute medical needs, decreased activity tolerance, decreased IND with ADLs, fatigue  Recommendations for discharge: TCU  Rationale for recommendations: Pt would benefit from continued skilled OT services to improve independence and safety with ADL's and functional transfers as pt is not at baseline.          Entered by: Pat Feliz 09/11/2020 7:58 AM

## 2020-09-11 NOTE — PROGRESS NOTES
CLINICAL NUTRITION SERVICES - BRIEF NOTE      Nutrition Prescription     RECOMMENDATIONS FOR MDs/PROVIDERS TO ORDER:  --If PO intake remains poor pending kcal counts (pt needs to consume at least 1200 kcal, 50 g pro per day ~75% of minimum needs), rec placing feeding tube and starting EN to supplement intake.       --If EN becomes POC, please consult RD to order TF per MNT guidelines.     Recommendations already ordered by Registered Dietitian (RD):  --Adjusted Boost Glucose Control to TID between meals (10 am, 2 pm, HS).  --Kcal counts 9/12-9/14     Future/Additional Recommendations:  --If EN: Notify Endo.       --Nutren 1.5 @ 45 mL/hr to provide 1620 kcals (25 kcal/kg/day), 73 g PRO (1.1 g/kg/day), 821 mL H2O, 190 g CHO and no Fiber daily.       --Start TF @ 15 ml/hr and advance by 10 ml q 8 hrs until goal rate.       --Do not start or advance TF rate unless K+/Mg++ >/= WNL and Phos > 1.9.        --Minimum 30 ml q 4 hrs water flushes for tube patency.       --HOB > 30 degrees for gastric feeding tube placement.       --Certavite daily.      *Please see full assessment note from 9/8/2020    New Findings:  PO intake remains poor. Adjusted oral nutrition supplements to between meals, to ensure pt receives Boost Glucose Control in case pt does not order meals. Ordered kcal counts.  Significant BMs (11 on 9/9, 12 on 9/10), C. Diff negative, enteric panel pending.     Interventions  Kcal counts 9/12-9/14  Enteral Nutrition - recs  Medical food supplement therapy - adjusted to TID between  meals    RD to follow per protocol.    Pat Hunter, MS, RD, LD, CNSC  6C RD Pager: [phone]

## 2020-09-11 NOTE — PROGRESS NOTES
09/11/20 1140   Quick Adds   Type of Visit Initial PT Evaluation       Present no   Living Environment   Lives With parent(s)   Living Arrangements house   Home Accessibility stairs to enter home;stairs within home   Number of Stairs, Main Entrance 5   Stair Railings, Main Entrance railing on right side (ascending)   Number of Stairs, Within Home, Primary other (see comments)  (flight to second floor and to basement)   Transportation Anticipated car, drives self;family or friend will provide   Living Environment Comment PT: Pt lives in [place] in a house with is father. Reports 5 LUIS but also has to take the garbage out and to do that needs to do 5 + 5 stairs. Reports father is IND but will not help take out the garbage per report.    Self-Care   Usual Activity Tolerance good   Current Activity Tolerance poor   Regular Exercise Yes   Activity/Exercise Type walking   Exercise Amount/Frequency 3-5 times/wk   Equipment Currently Used at Home none   Activity/Exercise/Self-Care Comment PT: Pt reports being able to walk 4-5 miles every other day at home.    Functional Level Prior   Ambulation 0-->independent   Transferring 0-->independent   Toileting 0-->independent   Bathing 0-->independent   Communication 0-->understands/communicates without difficulty   Swallowing 0-->swallows foods/liquids without difficulty   Cognition 0 - no cognition issues reported   Fall history within last six months no   Number of times patient has fallen within last six months 1   Which of the above functional risks had a recent onset or change? ambulation;transferring   Prior Functional Level Comment PT: IND at baseline with mobility and ADLs   General Information   Onset of Illness/Injury or Date of Surgery - Date 08/31/20   Referring Physician Marcy Harper, APRN CNP    Patient/Family Goals Statement to improve activity tolerance and strength to return home    Pertinent History of Current Problem (include personal  factors and/or comorbidities that impact the POC) Mariusz Sharp is a 59 year old male with history of CAD, LV thrombus, CKD Stage III, PAF, DM Type II, and ICM s/p HM III LVAD as BTT with subsequent OHT 5/18/20. He presented with progressive SOB, cough, and fever, found to have RUL mass.    Precautions/Limitations fall precautions   Heart Disease Risk Factors Lack of physical activity;Medical history;Gender;Age   General Observations PT: Pt in supine, agreeable to PT eval   General Info Comments PT: Up w/ assist   Cognitive Status Examination   Orientation orientation to person, place and time   Level of Consciousness alert   Follows Commands and Answers Questions 100% of the time   Personal Safety and Judgment intact   Memory intact   Pain Assessment   Patient Currently in Pain No   Integumentary/Edema   Integumentary/Edema no deficits were identifed   Posture    Posture Forward head position;Protracted shoulders   Range of Motion (ROM)   ROM Comment BLE WFL   Strength   Strength Comments deconditioned, >3/5for functional mobility, evident in gait w/ decreased foot clearance   Bed Mobility   Bed Mobility Comments SBA   Transfer Skills   Transfer Comments CGA w/ increased effort to stand   Gait   Gait Comments CGA w/ limited tolerance, LE fatigue, 1IUE on IV pole   Balance   Balance Comments fair dynamic balance w/o device   Sensory Examination   Sensory Perception Comments reports no sensory changes   Coordination   Coordination Comments WFL   Muscle Tone   Muscle Tone Comments WFL   Modality Interventions   Planned Modality Interventions Comments none   General Therapy Interventions   Planned Therapy Interventions balance training;bed mobility training;gait training;strengthening;ROM;transfer training;risk factor education;home program guidelines;progressive activity/exercise   Clinical Impression   Criteria for Skilled Therapeutic Intervention yes, treatment indicated   PT Diagnosis impaired functional mobility  "  Influenced by the following impairments deconditioned, decreased activity tolerance, medical status   Functional limitations due to impairments decreased (I) w/ transfers, gait. decreased home access and IND w/ ADLs   Clinical Presentation Evolving/Changing   Clinical Presentation Rationale PMH. .clinical judgement, home set up, current status   Clinical Decision Making (Complexity) Moderate complexity   Therapy Frequency 5x/week   Predicted Duration of Therapy Intervention (days/wks) 7 days   Anticipated Equipment Needs at Discharge walker   Anticipated Discharge Disposition Transitional Care Facility   Risk & Benefits of therapy have been explained Yes   Patient, Family & other staff in agreement with plan of care Yes   [facility] TM \"6 Clicks\"   2016, Trustees of [facility], under license to [employer].  All rights reserved.   6 Clicks Short Forms Basic Mobility Inpatient Short Form   [facility]  \"6 Clicks\" V.2 Basic Mobility Inpatient Short Form   1. Turning from your back to your side while in a flat bed without using bedrails? 4 - None   2. Moving from lying on your back to sitting on the side of a flat bed without using bedrails? 3 - A Little   3. Moving to and from a bed to a chair (including a wheelchair)? 3 - A Little   4. Standing up from a chair using your arms (e.g., wheelchair, or bedside chair)? 3 - A Little   5. To walk in hospital room? 3 - A Little   6. Climbing 3-5 steps with a railing? 2 - A Lot   Basic Mobility Raw Score (Score out of 24.Lower scores equate to lower levels of function) 18   Total Evaluation Time   Total Evaluation Time (Minutes) 2     "

## 2020-09-11 NOTE — PLAN OF CARE
Discharge Planner PT   Patient plan for discharge: rehab  Current status: PT eval completed. Pt w/ poor activity tolerance this date. Is SBA w/ supine<>sit, needs CGA for sit<>stand. Pt ambulates w/ CGA and 1UE on IV pole 125', 50'. Pt w/ max fatigue post longer bout and RADER, SPO2 on RA ~88%, recovers to 94% ~30 sec rest break. Educated on seated/ supine LE exercise to perform over weekend.   Barriers to return to prior living situation: medical, mobility, activity tolerance, lives alone  Recommendations for discharge: TCU  Rationale for recommendations: Pt below baseline, limited activity tolerance for functional tasks. Recommend rehab stay prior to discharge home to progress functional IND.        Entered by: Macey Gutierrez 09/11/2020 12:52 PM

## 2020-09-11 NOTE — PLAN OF CARE
D: Admit 8/31 with syncope, SOB, cough, fever. PMH: HTN, CAD, CKD, PAF, DM2, ICM s/p LVAD HM3 with subsequent heart tx 5/18/20.       I: Monitored vitals and assessed pt status.   Running:SL  PRN:tylenol    A: A0x4. VSS,95% on RA. Occ wears 3L NC overnight. SR/ST. TMax 99.3ax.  Blood sugars AC/HS/02. Pt remains in enteric precautions. CDiff neg, additioal stool panel pending. Pt has had no more diarrhea this shift. Pt continues to have a freq strong productive congested. cough.    I/O this shift:  In: -   Out: 50 [Urine:50]    Temp:  [97.9  F (36.6  C)-100.1  F (37.8  C)] 98.3  F (36.8  C)  Pulse:  [] 96  Resp:  [16-30] 18  BP: (102-150)/() 104/74  SpO2:  [95 %-98 %] 95 %      P: Continue to monitor Pt status and report changes to treatment team.

## 2020-09-11 NOTE — PROGRESS NOTES
[facility]   Cardiology II Service / Advanced Heart Failure  Daily Progress Note      Patient: Mariusz Sharp  MRN: [id number]  Admission Date: 8/31/2020  Hospital Day # 11    Assessment and Plan: Mariusz Sharp is a 59 year old male with history of CAD, LV thrombus, CKD Stage III, PAF, DM Type II, and ICM s/p HM III LVAD as BTT with subsequent OHT 5/18/20. He presented with progressive SOB, cough, and fever, found to have RUL mass.     Today's Plan:  - f/u BAL cultures, tissue studies, blood cultures  - f/u tacrolimus level this PM  - encourage therapies and PO intake   - NS 500mL today  - home when cleared from therapy standpoint, likely will need o2,  aware    #Fever with cough secondary to RUL mass. P/w fever. History of recent rhinovirus. CDIF, LA, UA, COVID, respiratory viral panel, Legionella, Cryptococcus, Strep pneumo negative, Aspergillus, CMV, and EBV all negative. Renal US notes resolution of prior stone. History of PAcnes to outflow graft and Donor +S Anginosus and Veillonella (completed course of antibiotics). Has chronic prostatitis as below. Vanco/Zosyn discontinued 9/1. CT Chest RUL mass concerning for PTLD vs infection. Started on posaconazole 9/4 for positive fungitell. Continues to have breakthrough fevers. Blastomyces, histoplasma, aspergillus PCR negative. PJP negative 9/6/20.  - Transplant ID following, appreciate recs   - started on posaconazole 9/4 300 mg daily, level >1 on day 5  - Aggressive pulmonary toileting and nebs   - BAL and EBUS 9/8 with IR pulm    -- cytology negative for malignancy, rare pseudohyphae noted, PCP absent               -- tissue for histopathology, AFB, bacterial and fungal stains pending               -- tissue for bacterial, AFB and fungal cultures pending  - blood cultures 9/7, 9/9 no growth to date  - Continue Cefdinir 300 mg BID through 10/1/20 for prostatitis      # Status post OHT on 5/18/20, history of NICM and LVAD  # Chronic  immunosuppression  Echo 8/31 with EF 70% and normal graft function. mRA-3, RV-20/2, mPA-15, mPCWP-10, AMRIT CO-4.91, AMRIT CI-2.63 (weight 178 lb), biopsy negative.     Graft Function:   --Volume: euvolemic to hypovolemic, NS 500mL today given loose stools and Cr bump, encourage PO  --BP: 100s-130/80s-90s, hydralazine 25 mg tid (HOLD for sBP<100)  --HR: 90s-120s     Immunosuppression:   --recently off prednisone per taper protocol  --Cellcept decreased to 500 mg BID 9/8 given low Immuknow  --tacrolimus 0.5 mg bid, trough pending today, goal 8-10 (11.5 yesterday), daily troughs for now   --Immuknow 9/3 33, recheck 9/30 with next biopsy    Serostatus: CMV: D+, R-, EBV: D- R+, Toxo D- R-    Prophylaxis:   --CAV: aspirin 81 mg, rosuvastatin 10 mg daily  --Thrush: on posaconazole  --PCP: Bactrim caused hyperK, treated with pentamidine, no longer indicated as off prednisone  --GI: Protonix   --Osteoporosis: calcium/vitamin D   --CMV: D+/R-, Valcyte renally dosed 450 mg daily x 6 mos (through November)    #ANDREW  #Hyponatremia, hypovolemic  Cr trending up from 1.1 - suspect prerenal ANDREW +supratherpeutic tac level. Baseline outpatient mid 1s. Na larry 127, improved with IVF suggesting due to hypovolemia.  - intermittent NS 500mL if poor PO intake    #HTN. Prior to admission Norvasc on hold.  - hydralazine 25 mg TID, HOLD for sBP<100 prior to giving  - monitor     #Syncope, resolved. Occurred with cough prior to admission. MAPs stable.   - aggressive pulmonary toileting and nebs.      #Diarrhea, intermittent.  Cdif and CMV negative on admission. Resolved then increased again 9/9. Norovirus, cdif, full enteric panel negative.  - ok for prn Imodium     #DM Type II.   - Lantus 28 units at HS.   - Novolog carb coverage and ssi, per endocrine    #Elevated PSA with Concern for Chronic Prostatitis. PSA 8.21. MRI consistent with BPH.   - Cefdinir 300 mg BID through 10/1/20    FEN: diabetic diet  PROPHY:  subQ heparin, PPI  LINES:   "PIV  DISPO:  discharge home when cleared by therapies  CODE STATUS:  Full code    Dinora Leroy, LEX, NP-C  Advanced Heart Failure/Cardiology II Service  Pager [phone] [facility] [phone]    ================================================================    Subjective/24-Hr Events:   Last 24 hr care team notes reviewed. No true fevers overnight. Feeling a little better today. Up for therapies today. Still with hacking cough, unchanged. Loose stools improved with Imodium. No new complaints.     ROS:  4 point ROS including respiratory, CV, GI and  (other than that noted in the HPI) is negative.     Medications: Reviewed in EPIC.     Physical Exam:   /74   Pulse 96   Temp 98.3  F (36.8  C) (Oral)   Resp 18   Ht 1.626 m (5' 4\")   Wt 78.4 kg (172 lb 14.4 oz)   SpO2 95%   BMI 29.68 kg/m      GENERAL: Appears comfortable, in no distress, fatigued.  HEENT: Eye symmetrical, no discharge or icterus bilaterally. Mucous membranes moist and without lesions.  NECK: Supple, JVD not visible at 45 degrees.   CV: Tachycardic and regular, +S1S2, no murmur, rub, or gallop.   RESPIRATORY: Respirations regular, even, and unlabored. Lungs CTA throughout. No crackles or wheezes.  GI: Soft, obese and non distended with normoactive bowel sounds present in all quadrants. No tenderness, rebound, guarding.   EXTREMITIES: No peripheral edema. 2+ bilateral pedal pulses.   NEUROLOGIC: Alert and oriented x 3. No focal deficits.   MUSCULOSKELETAL: No joint swelling or tenderness.   SKIN: No jaundice. No rashes or lesions.     Labs:  CMP  Recent Labs   Lab 09/11/20  0538 09/10/20  0546 09/09/20  0535 09/08/20  0548  09/05/20  0549   * 131* 130* 130*   < > 128*   POTASSIUM 4.2 4.0 4.1 4.0   < > 4.0   CHLORIDE 100 100 100 99   < > 99   CO2 22 20 20 22   < > 20   ANIONGAP 9 11 9 9   < > 8   * 131* 163* 171*   < > 206*   BUN 28 23 24 22   < > 22   CR 1.72* 1.52* 1.59* 1.39*   < > 1.41*   GFRESTIMATED 43* 50* 47* 56*   < > 55* "   GFRESTBLACK 50* 58* 55* 64   < > 63   ARLENE 8.7 8.7 8.8 9.1   < > 8.6   MAG 1.8 1.7 1.8 1.8   < > 1.7   PROTTOTAL  --   --   --   --   --  6.6*   ALBUMIN  --   --   --   --   --  2.5*   BILITOTAL  --   --   --   --   --  0.8   ALKPHOS  --   --   --   --   --  68   AST  --   --   --   --   --  22   ALT  --   --   --   --   --  15    < > = values in this interval not displayed.       CBC  Recent Labs   Lab 09/11/20  0538 09/10/20  0546 09/09/20  0535 09/08/20  0548   WBC 6.9 6.8 6.0 7.1   RBC 2.77* 2.97* 2.80* 3.09*   HGB 7.5* 8.1* 7.6* 8.4*   HCT 24.0* 25.8* 24.4* 26.6*   MCV 87 87 87 86   MCH 27.1 27.3 27.1 27.2   MCHC 31.3* 31.4* 31.1* 31.6   RDW 13.6 13.5 13.7 13.8    [phone]         Time/Communication  I personally spent a total of 25 minutes. Of that 15 minutes was counseling/coordination of patient's care. Plan of care discussed with patient. See my note above for details.     Patient discussed with Dr. Nielson.

## 2020-09-11 NOTE — PROGRESS NOTES
"                          Diabetes Consult Daily  Progress Note          Assessment/Plan:     HPI:   Mariusz Sharp \"Vasyl" is a 59 year old male with history of CAD, LV thrombus, CKD Stage III, PAF, DM Type II, and ICM s/p HM III LVAD as BTT (2018) with subsequent OHT 5/18/20, presenting with progressive SOB, cough, and fever, found to have RUL mass.   Diabetes service consulted for persistent hyperglycemia, which seemed to be most related to insufficient/absent aspart for carbohydrates.     ASSESSMENT:    1)  Type II DM       Plan:    - continue aspart 1 unit per 6.5 grams carbohydrate for meals and         PRN snacks--  - continue aspart high resistance qAC, HS   - continue glargine 28 units at HS  - BG monitor qAC, HS 0200  - if appetite were to improve, could discuss GLP-1 RA again (previously needed a prior authorization for this class-- unknown if any changes to insurance)  - resume Dexcom when able to get another sensor         Outpatient diabetes follow up: [facility] Jeannette Schafer PA-C  Plan discussed with RN           Interval History:     The last 24 hours progress and nursing notes reviewed.  BG trends with no lows.  Near target with one elevated reading at 2154 last evening.  Able to eat a small amount of breakfast and tolerated well.  Has not eaten lunch yet.  Denies any nausea.  Denies any further episodes of loose stools and denies abdominal pain.  No other focal pain, no CP/SOB or any other complaints.  Feels better today.  Looks good sitting up in chair.    Recent Labs   Lab 09/11/20  0538 09/11/20  0250 09/10/20  2154 09/10/20  1648 09/10/20  1217 09/10/20  0953 09/10/20  0806 09/10/20  0546  09/09/20  0535  09/08/20  0548  09/07/20  0523  09/06/20  0429   *  --   --   --   --   --   --  131*  --  163*  --  171*  --  266*  --  234*   BGM  --  158* 227* 162* 187* 166* 167*  --    < >  --    < >  --    < >  --    < >  --     < > = values in this interval not displayed.           Nutrition: " "    Orders Placed This Encounter      Consistent Carbohydrate Diet 2372-3020 Calories: Moderate Consistent CHO (4-6 CHO units/meal)    Supplements: Boost Glucose control (had Boost Plus before)        PTA Regimen:     4-5 x/day + BG monitoring frequency-- Dexcom G6  Lantus 32 units at HS  Novolog 1 units per 8 grams carb  Novolog High (1 per 25) resistance scale             Review of Systems:   See interval hx          Medications:   No steroid         Physical Exam:   /83 (BP Location: Left arm, Cuff Size: Adult Regular)   Pulse 115   Temp 99.2  F (37.3  C) (Oral)   Resp 18   Ht 1.626 m (5' 4\")   Wt 78.4 kg (172 lb 14.4 oz)   SpO2 90%   BMI 29.68 kg/m      General: pleasant, in no acute distress.  HEENT: NC/AT. MMM, sclera not injected  Lungs: unlabored, no cough, no SOB  ABD: rounded, soft, no lipodystrophy noted  Skin: warm and dry, no obvious lesions  MSK:  moving all extremities  Lymp:  no LE edema  Mental Status: Alert and oriented  Psych:  Cooperative, good eye contact         Data:     Lab Results   Component Value Date    A1C 7.4 08/19/2020    A1C 7.2 08/13/2020    A1C 7.8 06/15/2020    A1C 8.0 05/12/2020    A1C 9.5 07/05/2019            CBC RESULTS:   Recent Labs   Lab Test 09/11/20  0538   WBC 6.9   RBC 2.77*   HGB 7.5*   HCT 24.0*   MCV 87   MCH 27.1   MCHC 31.3*   RDW 13.6        Recent Labs   Lab Test 09/11/20  0538 09/10/20  0546   * 131*   POTASSIUM 4.2 4.0   CHLORIDE 100 100   CO2 22 20   ANIONGAP 9 11   * 131*   BUN 28 23   CR 1.72* 1.52*   ARLENE 8.7 8.7     Liver Function Studies -   Recent Labs   Lab Test 09/05/20  0549   PROTTOTAL 6.6*   ALBUMIN 2.5*   BILITOTAL 0.8   ALKPHOS 68   AST 22   ALT 15     Lab Results   Component Value Date    INR 1.06 08/26/2020    INR 1.16 06/04/2020    INR 1.16 06/01/2020    INR 1.54 05/18/2020    INR 2.21 05/18/2020    INR 1.42 05/18/2020    INR 2.19 05/18/2020    INR 2.21 05/15/2020    INR 2.76 05/14/2020    INR 2.88 05/13/2020    " INR 2.47 05/12/2020    INR 2.5 05/11/2020       CARMEN Pardo CNP   Inpatient Diabetes Management Service  Pager - [phone]  Diabetes Management Team job code: [phone]       I spent a total of 25 minutes bedside and on the inpatient unit managing glycemic care.  Over 50% of my time on the unit was spent counseling the patient and/or coordinating care regarding acute hyperglycemic management.  See note for details.

## 2020-09-12 PROBLEM — N41.1 CHRONIC PROSTATITIS WITHOUT HEMATURIA: Status: ACTIVE | Noted: 2020-08-19

## 2020-09-12 LAB
ALBUMIN SERPL-MCNC: 1.9 G/DL (ref 3.4–5)
ALP SERPL-CCNC: 102 U/L (ref 40–150)
ALT SERPL W P-5'-P-CCNC: 47 U/L (ref 0–70)
ANION GAP SERPL CALCULATED.3IONS-SCNC: 8 MMOL/L (ref 3–14)
AST SERPL W P-5'-P-CCNC: 85 U/L (ref 0–45)
BILIRUB DIRECT SERPL-MCNC: 0.2 MG/DL (ref 0–0.2)
BILIRUB SERPL-MCNC: 0.6 MG/DL (ref 0.2–1.3)
BUN SERPL-MCNC: 31 MG/DL (ref 7–30)
CALCIUM SERPL-MCNC: 8.9 MG/DL (ref 8.5–10.1)
CD3 CELLS # BLD: 618 CELLS/UL (ref 603–2990)
CD3 CELLS NFR BLD: 71 % (ref 49–84)
CD3+CD4+ CELLS # BLD: 359 CELLS/UL (ref 441–2156)
CD3+CD4+ CELLS NFR BLD: 41 % (ref 28–63)
CD3+CD4+ CELLS/CD3+CD8+ CLL BLD: 3.15 % (ref 1.4–2.6)
CD3+CD8+ CELLS # BLD: 114 CELLS/UL (ref 125–1312)
CD3+CD8+ CELLS NFR BLD: 13 % (ref 10–40)
CHLORIDE SERPL-SCNC: 98 MMOL/L (ref 94–109)
CO2 SERPL-SCNC: 22 MMOL/L (ref 20–32)
CREAT SERPL-MCNC: 1.82 MG/DL (ref 0.66–1.25)
ERYTHROCYTE [DISTWIDTH] IN BLOOD BY AUTOMATED COUNT: 13.6 % (ref 10–15)
GFR SERPL CREATININE-BSD FRML MDRD: 40 ML/MIN/{1.73_M2}
GLUCOSE BLDC GLUCOMTR-MCNC: 194 MG/DL (ref 70–99)
GLUCOSE BLDC GLUCOMTR-MCNC: 196 MG/DL (ref 70–99)
GLUCOSE BLDC GLUCOMTR-MCNC: 204 MG/DL (ref 70–99)
GLUCOSE BLDC GLUCOMTR-MCNC: 212 MG/DL (ref 70–99)
GLUCOSE SERPL-MCNC: 201 MG/DL (ref 70–99)
HCT VFR BLD AUTO: 25 % (ref 40–53)
HGB BLD-MCNC: 7.8 G/DL (ref 13.3–17.7)
IFC SPECIMEN: ABNORMAL
LDH SERPL L TO P-CCNC: 380 U/L (ref 85–227)
MAGNESIUM SERPL-MCNC: 2.2 MG/DL (ref 1.6–2.3)
MCH RBC QN AUTO: 26.8 PG (ref 26.5–33)
MCHC RBC AUTO-ENTMCNC: 31.2 G/DL (ref 31.5–36.5)
MCV RBC AUTO: 86 FL (ref 78–100)
P JIROVECII DNA SPEC QL NAA+PROBE: NOT DETECTED
PLATELET # BLD AUTO: 437 10E9/L (ref 150–450)
POTASSIUM SERPL-SCNC: 4.1 MMOL/L (ref 3.4–5.3)
PROT SERPL-MCNC: 6.2 G/DL (ref 6.8–8.8)
RBC # BLD AUTO: 2.91 10E12/L (ref 4.4–5.9)
SODIUM SERPL-SCNC: 128 MMOL/L (ref 133–144)
SPECIMEN SOURCE: NORMAL
TACROLIMUS BLD-MCNC: 10.9 UG/L (ref 5–15)
TME LAST DOSE: NORMAL H
WBC # BLD AUTO: 7.9 10E9/L (ref 4–11)

## 2020-09-12 PROCEDURE — 86359 T CELLS TOTAL COUNT: CPT | Performed by: NURSE PRACTITIONER

## 2020-09-12 PROCEDURE — 25000128 H RX IP 250 OP 636: Performed by: NURSE PRACTITIONER

## 2020-09-12 PROCEDURE — 99233 SBSQ HOSP IP/OBS HIGH 50: CPT | Performed by: NURSE PRACTITIONER

## 2020-09-12 PROCEDURE — 25000132 ZZH RX MED GY IP 250 OP 250 PS 637: Performed by: STUDENT IN AN ORGANIZED HEALTH CARE EDUCATION/TRAINING PROGRAM

## 2020-09-12 PROCEDURE — 83615 LACTATE (LD) (LDH) ENZYME: CPT | Performed by: NURSE PRACTITIONER

## 2020-09-12 PROCEDURE — 21400000 ZZH R&B CCU UMMC

## 2020-09-12 PROCEDURE — 25000132 ZZH RX MED GY IP 250 OP 250 PS 637: Performed by: NURSE PRACTITIONER

## 2020-09-12 PROCEDURE — 25000131 ZZH RX MED GY IP 250 OP 636 PS 637: Performed by: NURSE PRACTITIONER

## 2020-09-12 PROCEDURE — 36415 COLL VENOUS BLD VENIPUNCTURE: CPT | Performed by: NURSE PRACTITIONER

## 2020-09-12 PROCEDURE — 86360 T CELL ABSOLUTE COUNT/RATIO: CPT | Performed by: NURSE PRACTITIONER

## 2020-09-12 PROCEDURE — 80048 BASIC METABOLIC PNL TOTAL CA: CPT | Performed by: STUDENT IN AN ORGANIZED HEALTH CARE EDUCATION/TRAINING PROGRAM

## 2020-09-12 PROCEDURE — 25000132 ZZH RX MED GY IP 250 OP 250 PS 637: Performed by: INTERNAL MEDICINE

## 2020-09-12 PROCEDURE — 00000146 ZZHCL STATISTIC GLUCOSE BY METER IP

## 2020-09-12 PROCEDURE — 80076 HEPATIC FUNCTION PANEL: CPT | Performed by: STUDENT IN AN ORGANIZED HEALTH CARE EDUCATION/TRAINING PROGRAM

## 2020-09-12 PROCEDURE — 83735 ASSAY OF MAGNESIUM: CPT | Performed by: STUDENT IN AN ORGANIZED HEALTH CARE EDUCATION/TRAINING PROGRAM

## 2020-09-12 PROCEDURE — 80197 ASSAY OF TACROLIMUS: CPT | Performed by: NURSE PRACTITIONER

## 2020-09-12 PROCEDURE — 85027 COMPLETE CBC AUTOMATED: CPT | Performed by: STUDENT IN AN ORGANIZED HEALTH CARE EDUCATION/TRAINING PROGRAM

## 2020-09-12 RX ORDER — POSACONAZOLE 100 MG/1
200 TABLET, DELAYED RELEASE ORAL DAILY
Status: DISCONTINUED | OUTPATIENT
Start: 2020-09-13 | End: 2020-09-13

## 2020-09-12 RX ORDER — ATOVAQUONE 750 MG/5ML
750 SUSPENSION ORAL EVERY 12 HOURS SCHEDULED
Status: DISCONTINUED | OUTPATIENT
Start: 2020-09-12 | End: 2020-09-15

## 2020-09-12 RX ADMIN — Medication 25 MG: at 19:44

## 2020-09-12 RX ADMIN — ASPIRIN 81 MG CHEWABLE TABLET 81 MG: 81 TABLET CHEWABLE at 08:59

## 2020-09-12 RX ADMIN — VALGANCICLOVIR 450 MG: 450 TABLET, FILM COATED ORAL at 08:59

## 2020-09-12 RX ADMIN — PROCHLORPERAZINE EDISYLATE 5 MG: 5 INJECTION INTRAMUSCULAR; INTRAVENOUS at 08:52

## 2020-09-12 RX ADMIN — CEFDINIR 300 MG: 300 CAPSULE ORAL at 19:44

## 2020-09-12 RX ADMIN — TACROLIMUS 0.5 MG: 0.5 CAPSULE ORAL at 17:14

## 2020-09-12 RX ADMIN — ACETAMINOPHEN 975 MG: 325 TABLET, FILM COATED ORAL at 19:44

## 2020-09-12 RX ADMIN — HEPARIN SODIUM 5000 UNITS: 5000 INJECTION, SOLUTION INTRAVENOUS; SUBCUTANEOUS at 03:02

## 2020-09-12 RX ADMIN — Medication 1 TABLET: at 17:14

## 2020-09-12 RX ADMIN — HEPARIN SODIUM 5000 UNITS: 5000 INJECTION, SOLUTION INTRAVENOUS; SUBCUTANEOUS at 19:44

## 2020-09-12 RX ADMIN — Medication 25 MG: at 13:21

## 2020-09-12 RX ADMIN — ROSUVASTATIN CALCIUM 10 MG: 10 TABLET, FILM COATED ORAL at 21:51

## 2020-09-12 RX ADMIN — TACROLIMUS 0.5 MG: 0.5 CAPSULE ORAL at 09:00

## 2020-09-12 RX ADMIN — ATOVAQUONE 750 MG: 750 SUSPENSION ORAL at 19:44

## 2020-09-12 RX ADMIN — HEPARIN SODIUM 5000 UNITS: 5000 INJECTION, SOLUTION INTRAVENOUS; SUBCUTANEOUS at 13:21

## 2020-09-12 RX ADMIN — MYCOPHENOLATE MOFETIL 500 MG: 500 TABLET ORAL at 09:00

## 2020-09-12 RX ADMIN — Medication 1 TABLET: at 08:59

## 2020-09-12 RX ADMIN — LIDOCAINE 1 PATCH: 560 PATCH PERCUTANEOUS; TOPICAL; TRANSDERMAL at 19:44

## 2020-09-12 RX ADMIN — POSACONAZOLE 300 MG: 100 TABLET, DELAYED RELEASE ORAL at 09:00

## 2020-09-12 RX ADMIN — MYCOPHENOLATE MOFETIL 500 MG: 500 TABLET ORAL at 17:14

## 2020-09-12 RX ADMIN — Medication 25 MG: at 09:00

## 2020-09-12 RX ADMIN — CEFDINIR 300 MG: 300 CAPSULE ORAL at 09:00

## 2020-09-12 ASSESSMENT — ACTIVITIES OF DAILY LIVING (ADL)
ADLS_ACUITY_SCORE: 15

## 2020-09-12 ASSESSMENT — MIFFLIN-ST. JEOR: SCORE: 1526.17

## 2020-09-12 ASSESSMENT — PAIN DESCRIPTION - DESCRIPTORS: DESCRIPTORS: ACHING

## 2020-09-12 NOTE — PLAN OF CARE
Pt continues to have productive cough. Received PRN Zofran for nausea. Had a small emesis then felt better. Pt had a low grade fever. PRN tylenol utilized. Temp decreased. Pt was more comfortable throughout the night.

## 2020-09-12 NOTE — PROGRESS NOTES
"[facility]  Transplant Infectious Disease Progress Note     Patient:  Mariusz Sharp, Date of birth 1960, Medical record number [id number]  Date of Visit:  09/12/2020         Assessment and Recommendations:   Recommendations:  - Start atovaquone 750 mg po BID, unless you are willing to rechallenge with bactrim for PCP prophylaxis.   - Check CD4 count, IgG, IgM, IgA, IgE, LDH (has been rising, last 228 on 9/1/2020), to assess predisposition to these multiple infections.   - Check PSA, to compare with 8/19/2020 value of 10.8 to assess response to chronic prostatitis therapy, since he has been on antibiotics since that value.   - Check LFTs, because he has not had a check since 9/5/2020, posaconazole started 9/4/2020, and his feelings of not being well he relates to use of posaconazole.   - Check UA, bacterial UCx, and viral UCx, given fever.   - Continue posaconazole, but OK to drop posaconazole dose to 200 mg daily, to see if his nausea is less.   - Check CMV PCR (last check neg on 9/1/2020, in the setting of valcyte prophylaxis).   - Continue cefdinir for chronic prostatitis until planned stop date of 10/1/2020.     Transplant Infectious Disease will continue to follow with you.      Assessment:  Mariusz Sharp \"Red\" is a 59 year old male with PMHx significant for h/o CKD, pAF, HTN, DMII, CAD (s/p STEMI w/ ALYSSA to LAD 6/27/18), LV thrombus, ICM s/p HM3 LVAD (12/31/18) and now OHT 5/2020 with basiliximab induction followed by MMF/pred maintenance.  Infectious Disease issues include:  - Fever. LDH (has been rising, last 228 on 9/1/2020), so this could be PCP. BCx neg 9/9/2020. 9/8/2020 BAL / EBUS neg. On posa to cover mass. Needs PCP prophylaxis started, has not had any since 7/29/2020. Check CD4 count, IgG, IgM, IgA, IgE to see what may be involved in his predisposition to infection.   - PCP prophylaxis: Bactrim was discontinued 5/27/2020 due to high potassium levels, and nebulized " pentamidine was given in its place 6/1/2020, 6/30/2020, and 7/29/2020. Breakthrough pneumocystis infections can occur when nebulized pentamidine is used, delicia in the upper lobes, so is can be misleading that he had 9/2/2020 PJP PCR of sputum neg and 9/8/2020 BAL cytology neg for PJP. Would not use dapsone as he is already anemic. So, need to cover with something, in his case would start with atovaquone, and would start with a dose of 750 mg BID.   - Mass vs consolidation in the right upper lobe and right hilum since 7/29/2020 with narrowing of multiple right upper lobe bronchi. DDx pneumonia with reactive right hilar lymphadenopathy vs. malignancy (ex. lymphoma/PTLD with post obstructive pneumonia, but EBV neg). Empiric coverage for opportunistic mycosis pneumonia with posaconazole. FNA of bronchial lesion 9/8/2020 without a diagnosis, although fungal 28S testing still pending. Only 116 WBC in BAL fluid. 9/4/2020 Histo & blasto ags neg. 9/2/2020 PJP PCR of sputum neg. 8/31/2020 Fungitell + at 90. 8/31/2020 Asp GM ag neg. 9/1/2020 and 9/4/2020 cryptococcal ag neg. 9/9/2020 posaconazole level 1.6, on 300 mg daily. Continue posaconazole, but OK to drop posaconazole dose to 200 mg daily, to see if his nausea is less.  - Enlarged prostate by CT imaging in the past, combined with elevated PSA, chronic prostatitis. 8/19/2020 PSA is 10.8. UCx neg, including post-prostate massage sample 8/20/2020. WBC in UA have decreased since starting 6-week antibiotic course. Cefdinir due to end 10/1/2020. No real change to dribbling symptom. Would check PSA, UA, UCx again for an interval assessment at this point in time.   - Diarrhea, mild. Stool testing neg 9/10/2020 for enteric bacteria, enteric viruses, and C diff.   - Hx of Rhinovirus shedding 8/19/2020. Neg on NP swab 8/31/2020.   - Donor BCx grew S anginosus and Veillonella sp in 1/2 sets on 5/16/20 (suspected contaminant) treated with 7 days antibiotics.  - Cutibacterium acnes  growth from broth only from LVAD on removal, s/p doxycycline x 14 days.  - QTc interval: 448 msec on 9/9/2020 EKG  - Viral serostatus & prophylaxis: CMV D+/R-, EBV D-/R+, HSV1?/2?, VZV +, Toxo D-/R-; Valcyte  - Immunization status: he will be due for seasonal influenza, once the vaccine is available  - Gamma globulin status: unknown  - Isolation status: Good hand hygiene.     Martha Holguin MD. Pager [phone]         Interval History:   Since Red was last seen by ID on 9/10/2020, he is not improving so the cardiology team has asked me to see him. This is my first day seeing him this hospital stay; chart reviewed to date. He has been on cefdinir since 9/1/2020, and posaconazole since 9/4/2020. The posaconazole is not sitting well with him. He has had a daily temp into the 100F range 9/7/2020 through 9/10/2020, with a spike overnight to 101.8F on 9/11/2020. 9/7/2020 pCXR with less prominent R superior hilar opacity. WBC has been steadily moving upwards, today 7.9., which often would be interpreted as a normal WBC, but his baseline WBC is in the 3K range. Of note, he had mild temperature elevation when he was admitted on 8/31/2020, and received doses of vanco (8/31/2020) and zosyn (8/31/2020 to 9/1/2020) until cefdinir started 9/1/2020. Today he is day+117 from transplant. 9/10/2020 stool TO testing neg.       Transplants:  5/18/2020 (Heart), Postoperative day:  117.  Coordinator Angelika Muller    Review of Systems:  CONSTITUTIONAL:  Fever overnight to 101.8F. He had sweats that broke after tylenol, and his pillowcase was wet. No chills.   EYES: no acute changes to vision.  ENT:  No acute changes to hearing.  RESPIRATORY:  + productive cough, although sputum is clear.   CARDIOVASCULAR:  + chest pain along the sternum, prob where his ribs attach (due to cough).  GASTROINTESTINAL:  zofran for nausea, small emesis. + 2 diarrheal stools per day.   GENITOURINARY:  He usually has a little bit of dribbling at the  beginning and end of his stream, no change over 3 weeks of antibacterial antibiotics.  HEME:  No bleeding.  INTEGUMENT:  No rash.  NEURO:  Alert and oriented. No headache.         Current Medications & Allergies:       aspirin  81 mg Oral Daily     calcium carbonate 600 mg-vitamin D 400 units  1 tablet Oral BID w/meals     cefdinir  300 mg Oral Q12H CHANCE     heparin ANTICOAGULANT  5,000 Units Subcutaneous Q8H     hydrALAZINE  25 mg Oral TID     insulin aspart   Subcutaneous Daily with lunch     insulin aspart  1-10 Units Subcutaneous TID w/meals     insulin aspart  1-7 Units Subcutaneous At Bedtime     insulin aspart   Subcutaneous QAM AC     insulin aspart   Subcutaneous Daily with supper     insulin glargine  28 Units Subcutaneous At Bedtime     lidocaine  1 patch Transdermal Q24h    And     lidocaine   Transdermal Q8H     mycophenolate  500 mg Oral BID IS     posaconazole  300 mg Oral Daily     rosuvastatin  10 mg Oral At Bedtime     sodium chloride (PF)  3 mL Intracatheter Q8H     tacrolimus  0.5 mg Oral BID IS     valGANciclovir  450 mg Oral Daily       Infusions/Drips:    - MEDICATION INSTRUCTIONS -         No Known Allergies         Physical Exam:   Vitals were reviewed.  All vitals stable.  Patient Vitals for the past 24 hrs:   BP Temp Temp src Pulse Resp SpO2 Weight   09/12/20 0710 114/79 99.6  F (37.6  C) Axillary 120 18 92 % --   09/12/20 0305 114/80 97.7  F (36.5  C) Axillary 98 19 93 % 79 kg (174 lb 3.2 oz)   09/11/20 2315 116/75 100  F (37.8  C) Axillary 124 18 93 % --   09/11/20 2202 -- 101.8  F (38.8  C) Axillary 120 -- -- --   09/11/20 2037 (!) 139/92 98.6  F (37  C) Oral 130 20 95 % --   09/11/20 1910 (!) 130/90 98.1  F (36.7  C) Oral 124 16 95 % --   09/11/20 1647 -- 99  F (37.2  C) Axillary -- -- -- --   09/11/20 1522 126/85 97.6  F (36.4  C) Oral 113 18 91 % --   09/11/20 1152 100/60 -- -- -- -- -- --   09/11/20 1111 (!) 86/57 98.2  F (36.8  C) Oral 105 18 98 % --     Ranges for vital signs:   Temp:  [97.6  F (36.4  C)-101.8  F (38.8  C)] 99.6  F (37.6  C)  Pulse:  [] 120  Resp:  [16-20] 18  BP: ()/(57-92) 114/79  SpO2:  [91 %-98 %] 92 %  Vitals:    09/09/20 0445 09/11/20 0250 09/12/20 0305   Weight: 79.2 kg (174 lb 8 oz) 78.4 kg (172 lb 14.4 oz) 79 kg (174 lb 3.2 oz)       Physical Examination:  GENERAL:  well-developed, well-nourished man, in bed in no acute distress.  HEAD:  Head is normocephalic, atraumatic   EYES:  Eyes have anicteric sclerae  ENT:  Oropharynx is dry without ulcers. Mild thin exudate on tongue is from mouth breathing. Tongue is midline  NECK:  Supple.   LUNGS:  A few scattered crackles. When he coughs during exam, this is a musical rattle through his chest, c/w air going past an obstruction.   CARDIOVASCULAR:  Tachycardic rate and rhythm with no murmur  ABDOMEN:  Normal bowel sounds, soft, nontender.   SKIN:  No acute rashes. PIV in place right forearm without any surrounding erythema or exudate.  NEUROLOGIC:  Grossly nonfocal. Active x4 extremities         Laboratory Data:     CD4 count testing  No results found for: ACD4    Inflammatory Markers    Recent Labs   Lab Test 08/19/20  1759  07/23/18  0645   CRP  --   --  11.0*   PSA 10.80*   < >  --     < > = values in this interval not displayed.       Immune Globulin Studies  No lab results found.      Metabolic Studies       Recent Labs   Lab Test 09/12/20  0545 09/11/20  0538  09/01/20  0658 08/31/20  1817 08/31/20  1259 08/26/20  0939  08/19/20  0558  06/15/20  0826   * 131*   < > 134  --  130* 137   < > 139   < > 139   POTASSIUM 4.1 4.2   < > 3.8  --  4.0 4.1   < > 3.6   < > 4.8   CHLORIDE 98 100   < > 104  --  100 105   < > 107   < > 110*   CO2 22 22   < > 22  --  22 25   < > 25   < > 21   ANIONGAP 8 9   < > 9  --  8 7   < > 6   < > 8   BUN 31* 28   < > 21  --  33* 41*   < > 27   < > 98*   CR 1.82* 1.72*   < > 1.38*  --  1.60* 1.91*   < > 1.31*   < > 1.89*   GFRESTIMATED 40* 43*   < > 56*  --  47* 38*   < > 60*    < > 38*   * 130*   < > 118*  --  186* 164*   < > 69*   < > 120*   A1C  --   --   --   --   --   --   --   --  7.4*   < > 7.8*   ARLENE 8.9 8.7   < > 8.5  --  9.6 9.3   < > 8.4*   < > 9.0   PHOS  --   --   --   --   --   --  3.0  --   --    < > 4.4   MAG 2.2 1.8   < > 1.4*  --   --  1.7  --   --    < > 1.7   LACT  --   --   --  0.9  --  1.5  --   --   --    < >  --    PCAL  --   --   --   --   --  0.23  --   --   --    < >  --    FGTL  --   --   --   --  90  --   --    < >  --   --   --    CKT  --   --   --   --   --   --   --   --   --   --  132    < > = values in this interval not displayed.       Hepatic Studies    Recent Labs   Lab Test 09/05/20  0549 09/04/20  0543 09/03/20  0526  09/01/20  0658  08/19/20  1759  07/29/20  0902   BILITOTAL 0.8 0.8 0.4   < > 0.9   < >  --    < > 0.6   DBIL  --   --   --   --   --   --   --   --  0.2   ALKPHOS 68 66 66   < > 62   < >  --    < > 42   PROTTOTAL 6.6* 6.5* 6.4*   < > 6.1*   < >  --    < > 6.3*   ALBUMIN 2.5* 2.6* 2.5*   < > 2.4*   < >  --    < > 3.4   AST 22 21 19   < > 16   < >  --    < > 20   ALT 15 12 16   < > 11   < >  --    < > 32   LDH  --   --   --   --  228*  --  215  --   --     < > = values in this interval not displayed.       Pancreatitis testing    Recent Labs   Lab Test 09/01/20  0658  05/18/20  0857 05/18/20  0010   AMYLASE  --   --  56 65   TRIG 143   < >  --   --     < > = values in this interval not displayed.       Gout Labs      Recent Labs   Lab Test 08/21/20  0456 07/06/19  0355 06/26/19  0925 01/23/19  1027 07/23/18  0645   URIC 5.2 5.0 5.0 4.4 4.4       Hematology Studies      Recent Labs   Lab Test 09/12/20  0545 09/11/20  0538 09/10/20  0546 09/09/20  0535 09/08/20  0548 09/07/20  0523  09/05/20  0549 09/04/20  0543   WBC 7.9 6.9 6.8 6.0 7.1 6.8   < > 5.3 3.8*   ANEU  --   --   --   --   --   --   --  4.1 3.1   ALYM  --   --   --   --   --   --   --  0.8 0.3*   MARTHA  --   --   --   --   --   --   --  0.3 0.2   AEOS  --   --   --   --   --    --   --  0.0 0.2   HGB 7.8* 7.5* 8.1* 7.6* 8.4* 8.2*   < > 8.3* 8.7*   HCT 25.0* 24.0* 25.8* 24.4* 26.6* 26.2*   < > 26.5* 27.5*    368 371 340 361 341   < > 353 376    < > = values in this interval not displayed.       Clotting Studies    Recent Labs   Lab Test 08/26/20  0939 06/04/20  0900 06/01/20  0856 05/18/20  1835   INR 1.06 1.16* 1.16* 1.54*   PTT  --   --   --  31       Iron Testing    Recent Labs   Lab Test 09/12/20  0545  06/26/19  0925  07/23/18 0645   IRON  --   --  58  --  35   FEB  --   --  370  --  272   IRONSAT  --   --  16  --  13*   JOSE  --   --  17*  --  646*   MCV 86   < > 88   < > 93   B12  --   --  638  --  1,143*    < > = values in this interval not displayed.       Arterial Blood Gas Testing    Recent Labs   Lab Test 05/24/20  1137 05/21/20  0756 05/21/20  0445 05/20/20  2340  05/19/20  0943 05/19/20  0812 05/19/20  0324 05/19/20  0009  05/18/20  1835   PH  --   --   --   --   --  7.40 7.40 7.39 7.35  --  7.43   PCO2  --   --   --   --   --  27* 33* 38 39  --  33*   PO2  --   --   --   --   --  131* 124* 132* 153*  --  323*   HCO3  --   --   --   --   --  17* 21 23 21  --  22   O2PER 24.0 2L 2L 2L   < > 40 40  40 40  40 40  40   < > 100    < > = values in this interval not displayed.        Thyroid Studies     Recent Labs   Lab Test 06/26/19  0925 07/23/18 0645   TSH 3.94 6.91*   T4  --  1.05       Urine Studies     Recent Labs   Lab Test 08/31/20  1615 08/20/20  1630 08/18/20  1404 05/18/20  0135 05/12/20  2250  01/30/19  1255   URINEPH 5.5 5.5 5.5 5.0 5.0   < > 5.0   NITRITE Negative Negative Negative Negative Negative   < > Negative   LEUKEST Negative Small* Moderate* Negative Negative   < > Negative   WBCU 1 12* 28* 7* 3   < > 82*   UWBCLM  --   --   --   --   --   --  Present*    < > = values in this interval not displayed.       Medication levels    Recent Labs   Lab Test 09/12/20  0545  09/09/20  0535  05/27/20  0837   VANCOMYCIN  --   --   --   --  26.6*   PSCON  --    --  1.6  --   --    TACROL 10.9   < > 11.6   < >  --     < > = values in this interval not displayed.       Microbiology:  Last Culture results with specimen source  Culture Micro   Date Value Ref Range Status   09/09/2020 No growth after 2 days  Preliminary   09/09/2020 No growth after 2 days  Preliminary   09/09/2020 No growth after 3 days  Preliminary   09/09/2020 No growth after 3 days  Preliminary   09/08/2020   Preliminary    Culture received and in progress.  Positive AFB results are called as soon as detected.    Final report to follow in 7 to 8 weeks.     09/08/2020   Preliminary    Assayed at [facility] [address] [place] [address] [phone]   09/08/2020 Culture negative after 3 days  Preliminary   09/08/2020 Light growth  Normal respiratory simona    Final   09/08/2020   Preliminary    Culture received and in progress.  Positive AFB results are called as soon as detected.    Final report to follow in 7 to 8 weeks.     09/08/2020   Preliminary    Assayed at [facility] [address] [place] [address] [phone]   09/08/2020 Culture negative after 3 days  Preliminary   09/08/2020 No growth after 3 days  Preliminary   09/08/2020 Light growth  Normal respiratory simona    Final   09/07/2020 No growth after 5 days  Preliminary   09/07/2020 No growth after 5 days  Preliminary   09/02/2020 (A)  Final    Canceled, Test credited  >10 Squamous epithelial cells/low power field indicates oral contamination. Please   recollect.     09/02/2020 Called to  Sasha Wheat RN at 0442 9/2/20 hg    Final   08/31/2020 Canceled, Test credited  Final   08/31/2020 (A)  Final    >10 Squamous epithelial cells/low power field indicates oral contamination. Please   recollect.     08/31/2020   Final    Notification of test cancellation was given to  Sasha Wheat RN @ 0133 9/1/20 TM.     08/31/2020 No growth  Final   08/31/2020 No growth  Final   08/20/2020 No growth  Final   08/20/2020 No yeast  isolated  Final   08/20/2020 Light growth  Normal simona    Final   08/19/2020 No growth  Final   08/18/2020   Final    <10,000 colonies/mL  urogenital simona  Susceptibility testing not routinely done     08/18/2020 No growth  Final   08/18/2020 No growth  Final   05/21/2020 No growth  Final   05/21/2020 No growth  Final   05/18/2020 (A)  Final    On day 5, isolated in broth only:  Cutibacterium (Propionibacterium) acnes     05/18/2020   Final    Susceptibility testing of Cutibacterium is not done from this source. Our antibiogram   indicates that Cutibacterium species are susceptible to penicillin and cefotaxime, and   most are susceptible to clindamycin.  Sugar Jimenez M.D., Medical Director     05/18/2020 Culture negative after 4 weeks  Final   05/18/2020 (A)  Final    On day 4, isolated in broth only:  Cutibacterium (Propionibacterium) acnes  Susceptibility testing of Cutibacterium is not done from this source. Our antibiogram   indicates that Cutibacterium species are susceptible to penicillin and cefotaxime, and   most are susceptible to clindamycin.  Sugar Jimenez M.D., Medical Director     05/12/2020 No growth  Final   05/12/2020 No growth  Final   03/27/2019   Final    <10,000 colonies/mL  urogenital simona  Susceptibility testing not routinely done     02/11/2019 No growth  Final   01/30/2019 No growth  Final    Specimen Description   Date Value Ref Range Status   09/09/2020 Blood  Final   09/09/2020 Blood  Final   09/09/2020 Blood Left Hand  Final   09/09/2020 Blood Right Hand  Final   09/09/2020 Feces  Final   09/08/2020 Bronchial FINE NEEDLE ASPIRATION  Final   09/08/2020 Bronchial FINE NEEDLE ASPIRATION  Final   09/08/2020 Bronchial FINE NEEDLE ASPIRATION  Final   09/08/2020 Bronchoalveolar Lavage FINE NEEDLEL ASPIRATE  Final   09/08/2020 Bronchoalveolar Lavage FINE NEEDLE ASPIRATE  Final   09/08/2020 Bronchial lavage RIGHT LUNG  Final   09/08/2020 Bronchial lavage RIGHT LUNG  Final    09/08/2020 Bronchial lavage Right Lung SPCEIMEN 2  Final   09/08/2020 Bronchial lavage Right Lung SPECIMEN 2  Final   09/08/2020 Bronchial lavage Right Lung SPECIMEN 2  Final   09/08/2020 Bronchial lavage Right Lung SPECIMEN 2  Final   09/07/2020 Blood Left Arm  Final   09/07/2020 Blood Left Arm  Final   09/04/2020 Blood  Final   09/02/2020 Sputum  Final   09/02/2020 Sputum Screen  Final   09/01/2020 Urine Random  Final   09/01/2020 Blood  Final   09/01/2020 Feces  Final   09/01/2020 Urine  Final   08/31/2020 Throat  Final   08/31/2020 Sputum  Final   08/31/2020 Sputum  Final   08/31/2020 Blood Left Arm  Final   08/31/2020 Right Arm  Final   08/20/2020 Midstream Urine  Final   08/20/2020 Midstream Urine  Final   08/20/2020 Sputum  Final   08/20/2020 Sputum  Final   08/19/2020 Midstream Urine  Final   08/18/2020 Midstream Urine  Final   08/18/2020 Blood Left Arm  Final   08/18/2020 Blood Left Arm  Final   05/21/2020 Blood Left Arm  Final   05/21/2020 Blood Right Arm  Final        Last check of C difficile  C Diff Toxin B PCR   Date Value Ref Range Status   09/09/2020 Negative NEG^Negative Final     Comment:     Negative: C. difficile target DNA sequences NOT detected, presumed negative   for C.difficile toxin B or the number of bacteria present may be below the   limit of detection for the test.  FDA approved assay performed using [employer] GeneXpert real-time PCR.  A negative result does not exclude actual disease due to C. difficile and may   be due to improper collection, handling and storage of the specimen or the   number of organisms in the specimen is below the detection limit of the assay.         Infection Studies to assess Diarrhea   Recent Labs   Lab Test 09/10/20  1415   EPSTX1 Not Detected   EPSTX2 Not Detected   EPCAMP Not Detected   EPSALM Not Detected   EPSHGL Not Detected   EPVIB Not Detected   EPROTA Not Detected   EPNORO Not Detected   EPYER Not Detected       Virology:  Respiratory virus testing     Recent Labs   Lab Test 08/31/20  1302 08/21/20  0520 08/19/20  1900  08/18/20  1235 05/29/20  1530   IFLUA Not Detected  --  Not Detected   < >  --   --    FLUAH1 Not Detected  --  Not Detected   < >  --   --    [id number] Not Detected  --  Not Detected   < >  --   --    FLUAH3 Not Detected  --  Not Detected   < >  --   --    IFLUB Not Detected  --  Not Detected   < >  --   --    PIV1 Not Detected  --  Not Detected  --   --   --    PIV2 Not Detected  --  Not Detected  --   --   --    PIV3 Not Detected  --  Not Detected  --   --   --    PIV4 Not Detected  --  Not Detected   < >  --   --    RSVA Not Detected  --  Not Detected   < >  --   --    RSVB Not Detected  --  Not Detected   < >  --   --    HMPV Not Detected  --  Not Detected  --   --   --    ADENOV Not Detected  --  Not Detected   < >  --   --    CORONA Not Detected  --  Not Detected   < >  --   --    [id number] Nasopharyngeal Nasopharyngeal  --   --  Nasopharyngeal Nasopharyngeal   SARSCOVRES NEGATIVE NEGATIVE  --   --  NEGATIVE NEGATIVE    < > = values in this interval not displayed.       Log IU/mL of CMVQNT   Date Value Ref Range Status   09/01/2020 Not Calculated <2.1 [Log_IU]/mL Final   08/19/2020 Not Calculated <2.1 [Log_IU]/mL Final   08/13/2020 Not Calculated <2.1 [Log_IU]/mL Final   06/15/2020 Not Calculated <2.1 [Log_IU]/mL Final       EBV DNA Copies/mL   Date Value Ref Range Status   09/01/2020 EBV DNA Not Detected EBVNEG^EBV DNA Not Detected [Copies]/mL Final   08/13/2020 EBV DNA Not Detected EBVNEG^EBV DNA Not Detected [Copies]/mL Final   06/15/2020 EBV DNA Not Detected EBVNEG^EBV DNA Not Detected [Copies]/mL Final       Imaging:  No results found for this or any previous visit (from the past 48 hour(s)).       EXAMINATION: CT CHEST W/O CONTRAST, 9/2/2020 4:23 PM  COMPARISON: CT chest 7/29/2020, CTA 1/30/2019, and CT 7/22/2018.   HISTORY: Cough, persistent  FINDINGS:  Chest: Postsurgical changes of heart transplant with unremarkable appearance  of  the allograft and unchanged mild soft tissue density thickening  along anterior pericardium. Intact median sternotomy wires. Great  vessels are grossly normal in caliber. Blood pool is hypoattenuating  the myocardium. Central tracheobronchial tree is patent.  New spiculated right upper lobe mass vs consolidation along the  superior right major fissure with adjacent groundglass nodularity  peripherally. There is new right hilar mass vs lymphadenopathy with  narrowing of multiple right upper lobe subsegmental bronchi. Unchanged  smaller nodules in the anterior right upper lobe (series 6 image 100).  Remaining lungs are clear. No effusion or pneumothorax. Scattered  calcified granulomas are unchanged.  Abdomen:  Extensive cholelithiasis without CT evidence for cholecystitis.  Bones/soft tissues:   No suspicious osseous lesion or acute abnormality. Mild degenerative  changes of the thoracic spine.      Impression    IMPRESSION:   1. New mass vs consolidation in the right upper lobe and right hilum  since 7/29/2020 with narrowing of multiple right upper lobe bronchi.   1a. Differential pneumonia with reactive right hilar lymphadenopathy  vs. malignancy(ex. Lymphoma/PTLD) with post obstructive pneumonia.  2. Stable additional pulmonary lesions as above.  3. Stable postsurgical changes of heart transplant.  4. Cholelithiasis without evidence of cholecystitis.       XR Chest Port 1 View    Narrative    Exam: XR CHEST PORT 1 VW, 9/7/2020 5:30 AM  Indication: pleuritic chest pain  Comparison: CT chest 9/2/2020, chest x-ray 9/1/2020  Findings:   Single portable view of the chest. Less prominent right superior hilar  opacity, better delineated on CT 9/2/2020. Cardiomediastinum and upper  abdomen are unchanged. No pneumothorax. No pleural effusion.    Impression    Impression:   1. No acute cardiopulmonary findings.  2. Less prominent right superior hilar opacity.

## 2020-09-12 NOTE — PROGRESS NOTES
[facility]   Cardiology II Service / Advanced Heart Failure  Daily Progress Note  Date of Service: 9/12/2020      Patient: Mariusz Sharp  MRN: [id number]  Admission Date: 8/31/2020  Hospital Day # 12    Assessment and Plan: Mariusz Sharp is a 59 year old male with PMH of CAD, LV thrombus, CKD Stage III, PAF, DM Type II, and ICM s/p HM III LVAD as BTT with subsequent OHT 5/18/20. He presents with progressive SOB, cough, and fever.      Plan today:  - Transplant ID to see pt today.   - Compazine prn nausea.   - Labs per ID recommendations ordered.      Fever with Cough secondary to RUL mass. Tmax 102 prior to admission. History of rhinovirus. CDIF, LA, UA, COVID, Legionella, Cryptococcus, Strep pneumo negative, Aspergillus, RSV, CMV, and EBV negative. Renal US notes resolution of prior stone. History of PAcnes to outflow graft and Donor positive S Anginosus and Veillonella (completed course of antibiotics). Concern for Chronic Prostatitis. Vanco/Zosyn discontinued 9/1. CT Chest positive for RUL mass concerning for PTLD vs infection. Beta D glucan 90 and trending upward. Appreciate IR Pulm consult. S/p EBUS with BAL 9/8 with negative cytology and PJP.   - Tissue for histopathology, AFB, bacterial, and fungal NTD.   - Appreciate Transplant ID Consult.   - Blood cultures NTD, repeated 9/9.  - Continue Cefdinir 300 mg po BID through 10/1/20.  - Posaconazole 300 mg po BID, level 1.6.      Diarrhea, Nausea, and Vomiting. CDIF negative and CMV negative.  - Repeat stool cultures negative.    - Compazine prn nausea.   - Nausea/vomiting may be secondary to antifungals. ID to see today. Will repeat level in AM to ensure stable level.      S/p OHT secondary to ICM. 5/18/20. Echo 8/31 with EF 70% and normal graft function.   RHC 8/26 with mRA-3, RV-20/2, mPA-15, mPCWP-10, AMRIT CO-4.91, AMRIT CI-2.63, biopsy negative.   Immunosuppression: Simulect 5/18 and 5/22. Tac 0.5 mg po BID, Tac level pending. Goal-8-10.  "Cellcept 500 mg po BID, and Prednisone completed 8/27/20.   Serostatus: CMV: D+, R-, EBV: D- R+, Toxo D- R-  Prophylaxis: Valcyte. Bactrim discontinued due to hyperkalemia. Pentamidine neb last administered 7/29, no further indication given he is off steroids.    - Continue ASA and Crestor.   - Immuknow 33 9/3 with reduction in Cellcept above.   - Next EMB due 9/30, recheck Immuknow at this time as well.      HTN. Prior to admission Norvasc on  Hold.   - Hydralazine 25 mg po TID, hold parameters.      DM Type II.   - Lantus 28 units at HS.   - Novolog preprandial with medium intensity sliding scale insulin.      Elevated PSA with Concern for Chronic Prostatitis. PSA-8.21. MRI consistent with BPH.   - Continue Cefdinir 300 mg po BID through 10/1/20.    Protein Calore Malnutrition. Decreased po intake with need for fluid resuscitation. Weight down 6 lbs from admission.   - Appreciate Nutrition consult.   - Encourage po intake, if no improvement will consider calorie counts.     FEN: Moderate Carb diet   PROPHY: Heparin SQ  LINES: PIV  DISPO: TBD. Therapy recommending TCU at this time  CODE STATUS: Full Code   ================================================================  Interval History/ROS: He continues to complain of nonproductive dry cough. He notes worsening night sweats, fever, and SOB. He complains of persistent loose stools, but unchanged frequency. He notes nausea with emesis this AM, which was unrelated to cough. He denies chest pain, palpitations, and LE edema. He is tolerating limited po due to nausea. He is making slow progression with therapy.     Last 24 hr care team notes reviewed.   ROS:  4 point ROS including Respiratory, CV, GI and , other than that noted in the HPI, is negative.     Medications: Reviewed in EPIC.     Physical Exam:   /79 (BP Location: Left arm)   Pulse 120   Temp 99.6  F (37.6  C) (Axillary)   Resp 18   Ht 1.626 m (5' 4\")   Wt 79 kg (174 lb 3.2 oz)   SpO2 92%   " BMI 29.90 kg/m    GENERAL: Appears alert and oriented times three.   HEENT: Eye symmetrical and free of discharge bilaterally. Mucous membranes moist and without lesions.  NECK: Supple and without lymphadenopathy. JVD below clavicular line.   CV: Tachycardic, regular. S1S2 present without murmur, rub, or gallop.   RESPIRATORY: Respirations regular, even, and unlabored. Lungs CTA throughout.   GI: Soft and non distended with normoactive bowel sounds present in all quadrants. No tenderness, rebound, guarding. No organomegaly.   EXTREMITIES: Trace bilateral LE peripheral edema. 2+ bilateral pedal pulses.   NEUROLOGIC: Alert and orientated x 3. CN II-XII grossly intact. No focal deficits.   MUSCULOSKELETAL: No joint swelling or tenderness.   SKIN: No jaundice. No rashes or lesions.     Data:  CMP  Recent Labs   Lab 09/12/20 0545 09/11/20  0538 09/10/20  0546 09/09/20  0535   * 131* 131* 130*   POTASSIUM 4.1 4.2 4.0 4.1   CHLORIDE 98 100 100 100   CO2 22 22 20 20   ANIONGAP 8 9 11 9   * 130* 131* 163*   BUN 31* 28 23 24   CR 1.82* 1.72* 1.52* 1.59*   GFRESTIMATED 40* 43* 50* 47*   GFRESTBLACK 46* 50* 58* 55*   ARLENE 8.9 8.7 8.7 8.8   MAG 2.2 1.8 1.7 1.8     CBC  Recent Labs   Lab 09/12/20  0545 09/11/20  0538 09/10/20  0546 09/09/20  0535   WBC 7.9 6.9 6.8 6.0   RBC 2.91* 2.77* 2.97* 2.80*   HGB 7.8* 7.5* 8.1* 7.6*   HCT 25.0* 24.0* 25.8* 24.4*   MCV 86 87 87 87   MCH 26.8 27.1 27.3 27.1   MCHC 31.2* 31.3* 31.4* 31.1*   RDW 13.6 13.6 13.5 13.7    [phone]     Patient discussed with Dr. Nielson.      Antonia Byrne FNP  9/12/2020

## 2020-09-12 NOTE — PROGRESS NOTES
"                          Diabetes Consult Daily  Progress Note          Assessment/Plan:     HPI:   Mariusz Sharp \"Red\" is a 59 year old male with history of CAD, LV thrombus, CKD Stage III, PAF, DM Type II, and ICM s/p HM III LVAD as BTT (2018) with subsequent OHT 5/18/20, presenting with progressive SOB, cough, and fever, found to have RUL mass. Patient is a heart transplant patient 5/18/2020.  Diabetes service consulted for persistent hyperglycemia, which seemed to be most related to insufficient/absent aspart for carbohydrates.    Not feeling well over interval - extensive infectious work up started. Not eating much. Low grade temps persist with an intermittent spike to 101.8. ID consulted today.       ASSESSMENT:     1)  Type II DM        Plan:     - Increase glargine 30 units at HS  - continue aspart 1 unit per 6.5 grams carbohydrate for meals and         PRN snacks--  - continue aspart high resistance qAC, HS   - BG monitor qAC, HS 0200  - if appetite were to improve, could discuss GLP-1 RA again (previously needed a prior authorization for this class-- unknown if any changes to insurance)  - resume Dexcom when able to get another sensor  - hypoglycemia protocol  - will follow       Outpatient diabetes follow up: [facility] Jeannette Schafer PA-C  Plan discussed with RN               Interval History:     The last 24 hours progress and nursing notes reviewed.  BG with spike above target.      Recent Labs   Lab 09/12/20  0714 09/12/20  0545 09/11/20  2102 09/11/20  1624 09/11/20  1405 09/11/20  0538 09/11/20  0250 09/10/20  2154  09/10/20  0546  09/09/20  0535  09/08/20  0548  09/07/20  0523   GLC  --  201*  --   --   --  130*  --   --   --  131*  --  163*  --  171*  --  266*   *  --  237* 264* 307*  --  158* 227*   < >  --    < >  --    < >  --    < >  --     < > = values in this interval not displayed.           Nutrition:     Orders Placed This Encounter      Consistent Carbohydrate Diet 4144-4044 " "Calories: Moderate Consistent CHO (4-6 CHO units/meal)    Supplements: Boost Glucose control (had Boost Plus before)        PTA Regimen:     4-5 x/day + BG monitoring frequency-- Dexcom G6  Lantus 32 units at HS  Novolog 1 units per 8 grams carb  Novolog High (1 per 25) resistance scale             Review of Systems:   See interval hx          Medications:   no steroid         Physical Exam:   /79 (BP Location: Left arm)   Pulse 120   Temp 99.6  F (37.6  C) (Axillary)   Resp 18   Ht 1.626 m (5' 4\")   Wt 79 kg (174 lb 3.2 oz)   SpO2 92%   BMI 29.90 kg/m         General: pleasant, in no acute distress. Getting a shave.   HEENT: NC/AT. MMM, sclera not injected  Lungs: unlabored, no cough, no SOB  ABD: rounded, soft  Skin: warm and dry, no obvious lesions  MSK:  moving all extremities  Lymp:  no LE edema  Mental Status: Alert and oriented  Psych:  Cooperative         Data:     Lab Results   Component Value Date    A1C 7.4 08/19/2020    A1C 7.2 08/13/2020    A1C 7.8 06/15/2020    A1C 8.0 05/12/2020    A1C 9.5 07/05/2019            CBC RESULTS:   Recent Labs   Lab Test 09/12/20  0545   WBC 7.9   RBC 2.91*   HGB 7.8*   HCT 25.0*   MCV 86   MCH 26.8   MCHC 31.2*   RDW 13.6        Recent Labs   Lab Test 09/12/20  0545 09/11/20  0538   * 131*   POTASSIUM 4.1 4.2   CHLORIDE 98 100   CO2 22 22   ANIONGAP 8 9   * 130*   BUN 31* 28   CR 1.82* 1.72*   ARLENE 8.9 8.7     Liver Function Studies -   Recent Labs   Lab Test 09/05/20  0549   PROTTOTAL 6.6*   ALBUMIN 2.5*   BILITOTAL 0.8   ALKPHOS 68   AST 22   ALT 15     Lab Results   Component Value Date    INR 1.06 08/26/2020    INR 1.16 06/04/2020    INR 1.16 06/01/2020    INR 1.54 05/18/2020    INR 2.21 05/18/2020    INR 1.42 05/18/2020    INR 2.19 05/18/2020    INR 2.21 05/15/2020    INR 2.76 05/14/2020    INR 2.88 05/13/2020    INR 2.47 05/12/2020    INR 2.5 05/11/2020     CARMEN Pardo CNP   Inpatient Diabetes Management Service  Pager - " [phone]  Diabetes Management Team job code: [id number]       I spent a total of 25  minutes bedside and on the inpatient unit managing glycemic care.  Over 50% of my time on the unit was spent counseling the patient and/or coordinating care regarding acute hyperglycemic management.  See note for details.

## 2020-09-13 ENCOUNTER — APPOINTMENT (OUTPATIENT)
Dept: CT IMAGING | Facility: CLINIC | Age: 60
End: 2020-09-13
Attending: NURSE PRACTITIONER
Payer: COMMERCIAL

## 2020-09-13 LAB
ANION GAP SERPL CALCULATED.3IONS-SCNC: 13 MMOL/L (ref 3–14)
BACTERIA SPEC CULT: NO GROWTH
BACTERIA SPEC CULT: NO GROWTH
BUN SERPL-MCNC: 28 MG/DL (ref 7–30)
CALCIUM SERPL-MCNC: 8.6 MG/DL (ref 8.5–10.1)
CHLORIDE SERPL-SCNC: 95 MMOL/L (ref 94–109)
CO2 SERPL-SCNC: 19 MMOL/L (ref 20–32)
CREAT SERPL-MCNC: 1.9 MG/DL (ref 0.66–1.25)
ERYTHROCYTE [DISTWIDTH] IN BLOOD BY AUTOMATED COUNT: 13.6 % (ref 10–15)
GFR SERPL CREATININE-BSD FRML MDRD: 38 ML/MIN/{1.73_M2}
GLUCOSE BLDC GLUCOMTR-MCNC: 165 MG/DL (ref 70–99)
GLUCOSE BLDC GLUCOMTR-MCNC: 182 MG/DL (ref 70–99)
GLUCOSE BLDC GLUCOMTR-MCNC: 262 MG/DL (ref 70–99)
GLUCOSE BLDC GLUCOMTR-MCNC: 264 MG/DL (ref 70–99)
GLUCOSE BLDC GLUCOMTR-MCNC: 322 MG/DL (ref 70–99)
GLUCOSE SERPL-MCNC: 167 MG/DL (ref 70–99)
HCT VFR BLD AUTO: 23.3 % (ref 40–53)
HGB BLD-MCNC: 7.2 G/DL (ref 13.3–17.7)
LACTATE BLD-SCNC: 0.7 MMOL/L (ref 0.7–2)
MAGNESIUM SERPL-MCNC: 1.6 MG/DL (ref 1.6–2.3)
MCH RBC QN AUTO: 26.7 PG (ref 26.5–33)
MCHC RBC AUTO-ENTMCNC: 30.9 G/DL (ref 31.5–36.5)
MCV RBC AUTO: 86 FL (ref 78–100)
PLATELET # BLD AUTO: 432 10E9/L (ref 150–450)
POTASSIUM SERPL-SCNC: 4.3 MMOL/L (ref 3.4–5.3)
RBC # BLD AUTO: 2.7 10E12/L (ref 4.4–5.9)
SODIUM SERPL-SCNC: 127 MMOL/L (ref 133–144)
SPECIMEN SOURCE: NORMAL
SPECIMEN SOURCE: NORMAL
TACROLIMUS BLD-MCNC: 9.3 UG/L (ref 5–15)
TME LAST DOSE: NORMAL H
WBC # BLD AUTO: 9 10E9/L (ref 4–11)

## 2020-09-13 PROCEDURE — 84134 ASSAY OF PREALBUMIN: CPT | Performed by: NURSE PRACTITIONER

## 2020-09-13 PROCEDURE — 25000132 ZZH RX MED GY IP 250 OP 250 PS 637: Performed by: STUDENT IN AN ORGANIZED HEALTH CARE EDUCATION/TRAINING PROGRAM

## 2020-09-13 PROCEDURE — 82784 ASSAY IGA/IGD/IGG/IGM EACH: CPT | Performed by: NURSE PRACTITIONER

## 2020-09-13 PROCEDURE — 36415 COLL VENOUS BLD VENIPUNCTURE: CPT | Performed by: NURSE PRACTITIONER

## 2020-09-13 PROCEDURE — 25000132 ZZH RX MED GY IP 250 OP 250 PS 637: Performed by: NURSE PRACTITIONER

## 2020-09-13 PROCEDURE — 94640 AIRWAY INHALATION TREATMENT: CPT

## 2020-09-13 PROCEDURE — 83735 ASSAY OF MAGNESIUM: CPT | Performed by: NURSE PRACTITIONER

## 2020-09-13 PROCEDURE — 00000146 ZZHCL STATISTIC GLUCOSE BY METER IP

## 2020-09-13 PROCEDURE — 25000131 ZZH RX MED GY IP 250 OP 636 PS 637: Performed by: NURSE PRACTITIONER

## 2020-09-13 PROCEDURE — 83605 ASSAY OF LACTIC ACID: CPT | Performed by: NURSE PRACTITIONER

## 2020-09-13 PROCEDURE — 25000125 ZZHC RX 250: Performed by: NURSE PRACTITIONER

## 2020-09-13 PROCEDURE — 80187 DRUG ASSAY POSACONAZOLE: CPT | Performed by: NURSE PRACTITIONER

## 2020-09-13 PROCEDURE — 84153 ASSAY OF PSA TOTAL: CPT | Performed by: NURSE PRACTITIONER

## 2020-09-13 PROCEDURE — 80048 BASIC METABOLIC PNL TOTAL CA: CPT | Performed by: NURSE PRACTITIONER

## 2020-09-13 PROCEDURE — 25000128 H RX IP 250 OP 636: Performed by: NURSE PRACTITIONER

## 2020-09-13 PROCEDURE — 40000275 ZZH STATISTIC RCP TIME EA 10 MIN

## 2020-09-13 PROCEDURE — 25800030 ZZH RX IP 258 OP 636: Performed by: NURSE PRACTITIONER

## 2020-09-13 PROCEDURE — 85027 COMPLETE CBC AUTOMATED: CPT | Performed by: NURSE PRACTITIONER

## 2020-09-13 PROCEDURE — 80197 ASSAY OF TACROLIMUS: CPT | Performed by: NURSE PRACTITIONER

## 2020-09-13 PROCEDURE — 99233 SBSQ HOSP IP/OBS HIGH 50: CPT | Performed by: NURSE PRACTITIONER

## 2020-09-13 PROCEDURE — 82785 ASSAY OF IGE: CPT | Performed by: NURSE PRACTITIONER

## 2020-09-13 PROCEDURE — 84154 ASSAY OF PSA FREE: CPT | Performed by: NURSE PRACTITIONER

## 2020-09-13 PROCEDURE — 40000141 ZZH STATISTIC PERIPHERAL IV START W/O US GUIDANCE

## 2020-09-13 PROCEDURE — 21400000 ZZH R&B CCU UMMC

## 2020-09-13 PROCEDURE — 71250 CT THORAX DX C-: CPT

## 2020-09-13 PROCEDURE — 25000132 ZZH RX MED GY IP 250 OP 250 PS 637: Performed by: INTERNAL MEDICINE

## 2020-09-13 PROCEDURE — 25000128 H RX IP 250 OP 636: Performed by: INTERNAL MEDICINE

## 2020-09-13 RX ORDER — CEFDINIR 300 MG/1
300 CAPSULE ORAL
Status: DISCONTINUED | OUTPATIENT
Start: 2020-09-13 | End: 2020-09-14

## 2020-09-13 RX ORDER — POSACONAZOLE 100 MG/1
200 TABLET, DELAYED RELEASE ORAL
Status: DISCONTINUED | OUTPATIENT
Start: 2020-09-14 | End: 2020-09-15

## 2020-09-13 RX ORDER — DEXTROSE MONOHYDRATE 100 MG/ML
INJECTION, SOLUTION INTRAVENOUS CONTINUOUS PRN
Status: DISCONTINUED | OUTPATIENT
Start: 2020-09-13 | End: 2020-09-22

## 2020-09-13 RX ORDER — SODIUM CHLORIDE 9 MG/ML
INJECTION, SOLUTION INTRAVENOUS CONTINUOUS
Status: ACTIVE | OUTPATIENT
Start: 2020-09-13 | End: 2020-09-13

## 2020-09-13 RX ORDER — PIPERACILLIN SODIUM, TAZOBACTAM SODIUM 2; .25 G/10ML; G/10ML
2.25 INJECTION, POWDER, LYOPHILIZED, FOR SOLUTION INTRAVENOUS EVERY 6 HOURS
Status: DISCONTINUED | OUTPATIENT
Start: 2020-09-13 | End: 2020-09-14

## 2020-09-13 RX ADMIN — ACETAMINOPHEN 975 MG: 325 TABLET, FILM COATED ORAL at 17:13

## 2020-09-13 RX ADMIN — PIPERACILLIN SODIUM AND TAZOBACTAM SODIUM 2.25 G: 2; .25 INJECTION, POWDER, LYOPHILIZED, FOR SOLUTION INTRAVENOUS at 21:26

## 2020-09-13 RX ADMIN — ROSUVASTATIN CALCIUM 10 MG: 10 TABLET, FILM COATED ORAL at 21:26

## 2020-09-13 RX ADMIN — ALBUTEROL SULFATE 2.5 MG: 2.5 SOLUTION RESPIRATORY (INHALATION) at 12:55

## 2020-09-13 RX ADMIN — ATOVAQUONE 750 MG: 750 SUSPENSION ORAL at 08:07

## 2020-09-13 RX ADMIN — MYCOPHENOLATE MOFETIL 500 MG: 500 TABLET ORAL at 15:47

## 2020-09-13 RX ADMIN — HEPARIN SODIUM 5000 UNITS: 5000 INJECTION, SOLUTION INTRAVENOUS; SUBCUTANEOUS at 15:45

## 2020-09-13 RX ADMIN — SODIUM CHLORIDE: 9 INJECTION, SOLUTION INTRAVENOUS at 08:00

## 2020-09-13 RX ADMIN — Medication 25 MG: at 08:07

## 2020-09-13 RX ADMIN — ACETAMINOPHEN 975 MG: 325 TABLET, FILM COATED ORAL at 08:32

## 2020-09-13 RX ADMIN — ATOVAQUONE 750 MG: 750 SUSPENSION ORAL at 19:30

## 2020-09-13 RX ADMIN — LIDOCAINE 1 PATCH: 560 PATCH PERCUTANEOUS; TOPICAL; TRANSDERMAL at 19:30

## 2020-09-13 RX ADMIN — VALGANCICLOVIR 450 MG: 450 TABLET, FILM COATED ORAL at 08:07

## 2020-09-13 RX ADMIN — CEFDINIR 300 MG: 300 CAPSULE ORAL at 08:16

## 2020-09-13 RX ADMIN — ASPIRIN 81 MG CHEWABLE TABLET 81 MG: 81 TABLET CHEWABLE at 08:07

## 2020-09-13 RX ADMIN — Medication 25 MG: at 15:46

## 2020-09-13 RX ADMIN — Medication 1 TABLET: at 08:07

## 2020-09-13 RX ADMIN — HEPARIN SODIUM 5000 UNITS: 5000 INJECTION, SOLUTION INTRAVENOUS; SUBCUTANEOUS at 23:21

## 2020-09-13 RX ADMIN — TACROLIMUS 0.5 MG: 0.5 CAPSULE ORAL at 08:06

## 2020-09-13 RX ADMIN — POSACONAZOLE 200 MG: 100 TABLET, DELAYED RELEASE ORAL at 08:16

## 2020-09-13 RX ADMIN — HEPARIN SODIUM 5000 UNITS: 5000 INJECTION, SOLUTION INTRAVENOUS; SUBCUTANEOUS at 03:06

## 2020-09-13 RX ADMIN — MYCOPHENOLATE MOFETIL 500 MG: 500 TABLET ORAL at 08:06

## 2020-09-13 RX ADMIN — TACROLIMUS 0.5 MG: 0.5 CAPSULE ORAL at 15:48

## 2020-09-13 RX ADMIN — MAGNESIUM SULFATE IN WATER 2 G: 40 INJECTION, SOLUTION INTRAVENOUS at 08:16

## 2020-09-13 RX ADMIN — Medication 25 MG: at 19:30

## 2020-09-13 RX ADMIN — PIPERACILLIN SODIUM AND TAZOBACTAM SODIUM 2.25 G: 2; .25 INJECTION, POWDER, LYOPHILIZED, FOR SOLUTION INTRAVENOUS at 17:01

## 2020-09-13 RX ADMIN — Medication 1 TABLET: at 15:47

## 2020-09-13 ASSESSMENT — ACTIVITIES OF DAILY LIVING (ADL)
ADLS_ACUITY_SCORE: 15
ADLS_ACUITY_SCORE: 15
ADLS_ACUITY_SCORE: 17
ADLS_ACUITY_SCORE: 15
ADLS_ACUITY_SCORE: 17
ADLS_ACUITY_SCORE: 17

## 2020-09-13 ASSESSMENT — MIFFLIN-ST. JEOR: SCORE: 1521.63

## 2020-09-13 ASSESSMENT — PAIN DESCRIPTION - DESCRIPTORS
DESCRIPTORS: NAGGING
DESCRIPTORS: SPASM

## 2020-09-13 NOTE — PROGRESS NOTES
"                          Diabetes Consult Daily  Progress Note          Assessment/Plan:     HPI:   Mariusz Sharp \"Red\" is a 59 year old male with history of CAD, LV thrombus, CKD Stage III, PAF, DM Type II, and ICM s/p HM III LVAD as BTT (2018) with subsequent OHT 5/18/20, presenting with progressive SOB, cough, and fever, found to have RUL mass. Patient is a heart transplant patient 5/18/2020.  Diabetes service consulted for persistent hyperglycemia, which seemed to be most related to insufficient/absent aspart for carbohydrates.     Continues to not feel well over interval - extensive infectious work up started. Not eating much. temps persist with an intermittent spike to 101.8. ID consulted 9/12.    Received call from RD.  Patient is going to have TF placed. When visited patient is not sure that tube will get placed today.         ASSESSMENT:     1)  Type II DM; dx 3 years ago.  Poorly controlled.  See A1c listed below.  Is anemic.        Plan:      - Increased glargine 30 units at HS   - continue aspart 1 unit per 6.5 grams carbohydrate for meals and  PRN snacks--   - continue aspart high resistance qAC, HS    - BG monitor qAC, HS 0200   - if appetite were to improve, could discuss GLP-1 RA again (previously needed a prior authorization for this class-- unknown if any changes to insurance)   - resume Dexcom when able to get another sensor   - hypoglycemia protocol   - will follow        Outpatient diabetes follow up: [facility] Jeannette Schafer PA-C  Plan discussed with RN              Interval History:     The last 24 hours progress and nursing notes reviewed.  Patient will be getting a TF placed today - time is unknown .  Nutren 1.5 @ 45 mL/hr to provide 1620 kcals (25 kcal/kg/day), 190 g CHO and no Fiber daily.  Start TF @ 15 ml/hr and advance by 10 ml q 8 hrs until goal rate. Do not start or advance TF rate unless K+/Mg++ >/= WNL and Phos > 1.9.   Patient has general feeling of malaise, nothing focal.  Denies " "HA, CP/cough or SOB.  No abd pain or cramping.  Of note - he has c/o cough/SOB and nausea to cards NP - denies when writer asks.  No problems with bowel or bladder.  No LE or UE pain.    Flat affect today.  More despondent.     Recent Labs   Lab 09/13/20  0717 09/13/20  0612 09/13/20  0305 09/12/20  2133 09/12/20  1650 09/12/20  1326 09/12/20  0714 09/12/20  0545  09/11/20  0538  09/10/20  0546  09/09/20  0535  09/08/20  0548   GLC  --  167*  --   --   --   --   --  201*  --  130*  --  131*  --  163*  --  171*   *  --  182* 194* 196* 212* 204*  --    < >  --    < >  --    < >  --    < >  --     < > = values in this interval not displayed.           Nutrition:     Orders Placed This Encounter      Consistent Carbohydrate Diet 4395-6156 Calories: Moderate Consistent CHO (4-6 CHO units/meal)    Nutren 1.5 @ 45 mL/hr to provide 1620 kcals (25 kcal/kg/day), 190 g CHO and no Fiber daily.  Start TF @ 15 ml/hr and advance by 10 ml q 8 hrs until goal rate.    Goal - 45cc/hr.          PTA Regimen:     4-5 x/day + BG monitoring frequency-- Dexcom G6  Lantus 32 units at HS  Novolog 1 units per 8 grams carb  Novolog High (1 per 25) resistance scale             Review of Systems:   See interval hx          Medications:            Physical Exam:     /83 (BP Location: Left arm)   Pulse 117   Temp 101.7  F (38.7  C) (Oral)   Resp 18   Ht 1.626 m (5' 4\")   Wt 78.6 kg (173 lb 3.2 oz)   SpO2 94%   BMI 29.73 kg/m      General: pleasant, in mild distress - appears restless/uncomfortable  HEENT: NC/AT. MMM, sclera not injected  Lungs: unlabored, no cough, no SOB  ABD: rounded, soft  Skin: warm and dry, no obvious lesions  Feet: WNL, no open areas, CMS intact.  Toenails WNL.    MSK:  moving all extremities  Lymp:  no LE edema  Mental Status: Alert and oriented x3  Psych:  Cooperative, more withdrawn, minimal eye contact         Data:     Lab Results   Component Value Date    A1C 7.4 08/19/2020    A1C 7.2 08/13/2020    " A1C 7.8 06/15/2020    A1C 8.0 05/12/2020    A1C 9.5 07/05/2019        CBC RESULTS:   Recent Labs   Lab Test 09/13/20  0612   WBC 9.0   RBC 2.70*   HGB 7.2*   HCT 23.3*   MCV 86   MCH 26.7   MCHC 30.9*   RDW 13.6        Recent Labs   Lab Test 09/13/20  0612 09/12/20  0545   * 128*   POTASSIUM 4.3 4.1   CHLORIDE 95 98   CO2 19* 22   ANIONGAP 13 8   * 201*   BUN 28 31*   CR 1.90* 1.82*   ARLENE 8.6 8.9     Liver Function Studies -   Recent Labs   Lab Test 09/12/20  0545   PROTTOTAL 6.2*   ALBUMIN 1.9*   BILITOTAL 0.6   ALKPHOS 102   AST 85*   ALT 47     Lab Results   Component Value Date    INR 1.06 08/26/2020    INR 1.16 06/04/2020    INR 1.16 06/01/2020    INR 1.54 05/18/2020    INR 2.21 05/18/2020    INR 1.42 05/18/2020    INR 2.19 05/18/2020    INR 2.21 05/15/2020    INR 2.76 05/14/2020    INR 2.88 05/13/2020    INR 2.47 05/12/2020    INR 2.5 05/11/2020       CARMEN Pardo CNP   Inpatient Diabetes Management Service  Pager - [phone]  Diabetes Management Team job code: [id number]     I spent a total of 25 minutes bedside and on the inpatient unit managing glycemic care.  Over 50% of my time on the unit was spent counseling the patient and/or coordinating care regarding acute hyperglycemic management.  See note for details.

## 2020-09-13 NOTE — PLAN OF CARE
D: Admitted 8/31 with syncope, SOB, cough, and fever. Found to have mass on RUL.   Hx: HTN, CAD, CKD3, hx of LV thrombus, PAF, DM2, ICM s/p LVAD HM3 with subsequent heart tx 5/18/20.     I: Monitored vitals and assessed pt status.   PRN: tylenol x1      A: A0x4. Slightly hypertensive at 0300 (143/93). Sating 92-94% on 3L NC. Tele shows ST, rate 100-130. TMax 101.8 - tylenol given. Pt reported generalized pain and back pain, relieved by prn tylenol and scheduled lidocaine patch. Intermittent nausea, but denied any this shift. Frequent, productive, congestive cough. BG stable overnight. Slept between cares.      P: Continue to monitor Pt status and report changes to Cards 2.     [id number]  Marcy López RN on 9/13/2020 at 6:12 AM

## 2020-09-13 NOTE — PLAN OF CARE
6C OT Cancel. Pt declining upon attempt in AM due to fatigue. Unable to check back due to scheduling constraints.

## 2020-09-13 NOTE — PROGRESS NOTES
[facility]   Cardiology II Service / Advanced Heart Failure  Daily Progress Note  Date of Service: 9/13/2020      Patient: Mariusz Shrap  MRN: [id number]  Admission Date: 8/31/2020  Hospital Day # 13    Assessment and Plan: Mariusz Sharp is a 59 year old male with PMH of CAD, LV thrombus, CKD Stage III, PAF, DM Type II, and ICM s/p HM III LVAD as BTT with subsequent OHT 5/18/20. He presents with progressive SOB, cough, and fever.      Plan today:  - Feeding tube per Xray.   - Noncontrast CT Chest today.   - LA WNL.      Fever with Cough secondary to RUL mass. Tmax 102 prior to admission. History of rhinovirus. CDIF, LA, UA, COVID, Legionella, Cryptococcus, Strep pneumo negative, Aspergillus, RSV, CMV, and EBV negative. Renal US notes resolution of prior stone. History of PAcnes to outflow graft and Donor positive S Anginosus and Veillonella (completed course of antibiotics). Concern for Chronic Prostatitis. Vanco/Zosyn discontinued 9/1. CT Chest positive for RUL mass concerning for PTLD vs infection. Beta D glucan 90 and trending upward. Appreciate IR Pulm consult. S/p EBUS with BAL 9/8 with negative cytology and PJP.   - Tissue for histopathology, AFB, bacterial, and fungal NTD.   - Appreciate Transplant ID Consult.   - Blood cultures NTD, repeated 9/9.  - Continue Cefdinir 300 mg po BID through 10/1/2, repeat PSA pending.   - Posaconazole 300 mg po BID, level 1.6.   - IGM, IGG, IGE, IGA, CMV, and urine studies pending.   - LDH-380. Absolute CD4-359.  - Atovaquone initiated 750 mg po BID.   - Repeat NonContrast Chest CT pending.      Diarrhea, Nausea, and Vomiting. CDIF negative and CMV negative. Repeat stool cultures negative.    - Compazine prn nausea.   - Posaconazole level pending today.      S/p OHT secondary to ICM. 5/18/20. Echo 8/31 with EF 70% and normal graft function.   RHC 8/26 with mRA-3, RV-20/2, mPA-15, mPCWP-10, AMRIT CO-4.91, AMRIT CI-2.63, biopsy negative. Immuknow 33 9/3 with  "reduction in Cellcept above.   Immunosuppression: Simulect 5/18 and 5/22. Tac 0.5 mg po BID, Tac level 10.9 yesterday with repeat pending today. Goal-8-10. Cellcept 500 mg po BID, and Prednisone completed 8/27/20.   Serostatus: CMV: D+, R-, EBV: D- R+, Toxo D- R-  Prophylaxis: Valcyte. Atovaquone per ID as above.   - Continue ASA and Crestor.   - Next EMB due 9/30, recheck Immuknow at this time as well.      HTN. Prior to admission Norvasc on  Hold.   - Hydralazine 25 mg po TID. Transient with lows and occasional high BP, trend.      DM Type II.   - Lantus 30 units at HS.   - Novolog preprandial with medium intensity sliding scale insulin.      Elevated PSA with Concern for Chronic Prostatitis. PSA-8.21. MRI consistent with BPH.   - Continue Cefdinir 300 mg po BID through 10/1/20.     Protein Calore Malnutrition. Decreased po intake with need for fluid resuscitation. Weight down 6 lbs from admission. Albumin 1.9.   - Appreciate Nutrition consult.    - Prealbumin pending   - Feeding tube per X-ray.   - NS @ 100 ml/hr for 5 hours this AM.      FEN: Moderate Carb diet   PROPHY: Heparin SQ  LINES: PIV  DISPO: TBD. Therapy recommending TCU at this time  CODE STATUS: Full Code   ================================================================  Interval History/ROS: He complains of persistent fever, chills, night sweats, non-productive cough, SOB, and nausea. He noted 2-3 loose stools yesterday. He denies chest pain, palpitations, vomiting, and LE edema. He is tolerating minimal po this AM and for the past week.     Last 24 hr care team notes reviewed.   ROS:  4 point ROS including Respiratory, CV, GI and , other than that noted in the HPI, is negative.     Medications: Reviewed in EPIC.     Physical Exam:   /83 (BP Location: Left arm)   Pulse 117   Temp 100.3  F (37.9  C) (Oral)   Resp 18   Ht 1.626 m (5' 4\")   Wt 78.6 kg (173 lb 3.2 oz)   SpO2 94%   BMI 29.73 kg/m    GENERAL: Appears alert and oriented " times three.   HEENT: Eye symmetrical and free of discharge bilaterally. Mucous membranes moist and without lesions.  NECK: Supple and without lymphadenopathy. JVD below clavicular line upright.   CV: RRR, S1S2 present without murmur, rub, or gallop.    RESPIRATORY: Respirations regular, even, and unlabored. Lungs CTA throughout.   GI: Soft and non distended with normoactive bowel sounds present in all quadrants. No tenderness, rebound, guarding. No organomegaly.   EXTREMITIES: Trace bilateral LE peripheral edema. 2+ bilateral pedal pulses.   NEUROLOGIC: Alert and orientated x 3. CN II-XII grossly intact. No focal deficits.   MUSCULOSKELETAL: No joint swelling or tenderness.   SKIN: No jaundice. No rashes or lesions. Warm to touch.     Data:  CMP  Recent Labs   Lab 09/13/20 0612 09/12/20  0545 09/11/20  0538 09/10/20  0546   * 128* 131* 131*   POTASSIUM 4.3 4.1 4.2 4.0   CHLORIDE 95 98 100 100   CO2 19* 22 22 20   ANIONGAP 13 8 9 11   * 201* 130* 131*   BUN 28 31* 28 23   CR 1.90* 1.82* 1.72* 1.52*   GFRESTIMATED 38* 40* 43* 50*   GFRESTBLACK 44* 46* 50* 58*   ARLENE 8.6 8.9 8.7 8.7   MAG 1.6 2.2 1.8 1.7   PROTTOTAL  --  6.2*  --   --    ALBUMIN  --  1.9*  --   --    BILITOTAL  --  0.6  --   --    ALKPHOS  --  102  --   --    AST  --  85*  --   --    ALT  --  47  --   --      CBC  Recent Labs   Lab 09/13/20 0612 09/12/20  0545 09/11/20  0538 09/10/20  0546   WBC 9.0 7.9 6.9 6.8   RBC 2.70* 2.91* 2.77* 2.97*   HGB 7.2* 7.8* 7.5* 8.1*   HCT 23.3* 25.0* 24.0* 25.8*   MCV 86 86 87 87   MCH 26.7 26.8 27.1 27.3   MCHC 30.9* 31.2* 31.3* 31.4*   RDW 13.6 13.6 13.6 13.5    [phone]     Patient discussed with Dr. Nielson.      Antonia Byrne FNP  9/13/2020

## 2020-09-13 NOTE — PHARMACY-CONSULT NOTE
Pharmacy Tube Feeding Consult    Medication reviewed for administration by feeding tube and for potential food/drug interactions.    Recommend changing the following medications to a liquid dosage form if unable to take medications via oral route:  -posaconazole EC (do not crush)  -tacrolimus capsules (do not crush or open capsules)  -mycophenolate (do not crush or open capsules)  -valganciclovir (do not crush)    Pharmacy will continue to follow as new medications are ordered.    Kayleen Shaw Pharm.D., [facility]  Pager [phone]

## 2020-09-13 NOTE — PROGRESS NOTES
"[facility]  Transplant Infectious Disease Progress Note     Patient:  Mariusz Sharp, Date of birth 1960, Medical record number [id number]  Date of Visit:  09/13/2020         Assessment and Recommendations:   Recommendations:  - Consider changing cefdinir to zosyn for coverage of both potential post-obstructive aspiration lung process, as well as coverage of chronic prostatitis (cefdinir until planned stop date of 10/1/2020).   - I have placed tomorrow's posaconazole dose on hold, so that a decision can be made whether to give it or not without the dose automatically given.   - Consider recheck of Fungitell (would be elevated in PCP).  - Consider re-consulting pulmonary, to assess diagnostic options for worsening right hilar mass / consolidation.   - Await pending IgG / IgM / IgA / IgE (assess predisposition to these multiple infections), PSA (compare with 8/19/2020 value of 10.8 to assess response to chronic prostatitis therapy), posaconazole level (9/12/2020 AST was elevated at 85), CMV PCR.   - Continue atovaquone 750 mg po BID, unless you are willing to rechallenge with bactrim for PCP prophylaxis.   - Determine on 9/14/2020 whether to continue posaconazole at reduced dose (200 mg daily starting 9/13/2020), to see if his nausea is less while awaiting 9/13/2020 posaconazole blood level. I have placed tomorrow's posaconazole dose on hold, so that a decision can be made whether to give it or not without the dose automatically given.     Transplant Infectious Disease will continue to follow with you.      Assessment:  Mariusz Sharp \"Red\" is a 59 year old male with PMHx significant for h/o CKD, pAF, HTN, DMII, CAD (s/p STEMI w/ ALYSSA to LAD 6/27/18), LV thrombus, ICM s/p HM3 LVAD (12/31/18) and now OHT 5/2020 with basiliximab induction followed by MMF/pred maintenance.  Infectious Disease issues include:  - Fever. 9/12/2020 CT with Increased size of the right hilar mass/consolidation " with associated narrowing of the subsegmental right upper lobe bronchioles and multiple scattered pulmonary nodules as above. Differential continues to include infection versus malignancy with postobstructive pneumonia. Increased bibasilar groundglass and right lung tree-in-bud nodular opacities suggestive of infection, possibly secondary to aspiration. Small right pleural effusion. LDH (has been rising, last 228 on 9/1/2020), so part of this could be PCP. BCx neg 9/9/2020. 9/8/2020 BAL / EBUS neg. On posa to cover mass. PCP prophylaxis restarted with atovaquone 9/12/2020. Checking IgG, IgM, IgA, IgE to see what may be involved in his predisposition to infection.   - Mass vs consolidation in the right upper lobe and right hilum since 7/29/2020 with narrowing of multiple right upper lobe bronchi. DDx pneumonia with reactive right hilar lymphadenopathy vs. malignancy (ex. lymphoma/PTLD with post obstructive pneumonia, but EBV neg). Empiric coverage for opportunistic mycosis pneumonia with posaconazole. FNA of bronchial lesion 9/8/2020 without a diagnosis, although fungal 28S testing still pending. Only 116 WBC in BAL fluid. 9/4/2020 Histo & blasto ags neg. 9/2/2020 PJP PCR of sputum neg. 8/31/2020 Fungitell + at 90. 8/31/2020 Asp GM ag neg. 9/1/2020 and 9/4/2020 cryptococcal ag neg. 9/9/2020 posaconazole level 1.6, on 300 mg daily, but AST then sonali to 85, so posaconazole dose decreased to 200 mg daily 9/13/2020.  - Enlarged prostate by CT imaging in the past, combined with elevated PSA, chronic prostatitis. 8/19/2020 PSA is 10.8. UCx neg, including post-prostate massage sample 8/20/2020. WBC in UA have decreased since starting 6-week antibiotic course. Cefdinir due to end 10/1/2020. No real change to dribbling symptom. 9/13/2020 PSA pending.  - Diarrhea, mild. Stool testing neg 9/10/2020 for enteric bacteria, enteric viruses, and C diff.   - Hx of Rhinovirus shedding 8/19/2020. Neg on NP swab 8/31/2020.   - Hx of  Donor BCx grew S anginosus and Veillonella sp in 1/2 sets on 5/16/20 (suspected contaminant) treated with 7 days antibiotics.  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  - PCP prophylaxis: Bactrim was discontinued 5/27/2020 due to high potassium levels, and nebulized pentamidine was given in its place 6/1/2020, 6/30/2020, and 7/29/2020. Breakthrough pneumocystis infections can occur when nebulized pentamidine is used, delicia in the upper lobes, so is can be misleading that he had 9/2/2020 PJP PCR of sputum neg and 9/8/2020 BAL cytology neg for PJP. Would not use dapsone as he is already anemic. Atovaquone started 9/12/2020, 750 mg BID.   - QTc interval: 448 msec on 9/9/2020 EKG  - Viral serostatus & prophylaxis: CMV D+/R-, EBV D-/R+, HSV1?/2?, VZV +, Toxo D-/R-; Valcyte  - Immunization status: he will be due for seasonal influenza, once the vaccine is available  - Gamma globulin status: unknown  - Isolation status: Good hand hygiene.     Martha Holguin MD. Pager [phone]         Interval History:   Since Red was last seen by me on 9/12/2020, he is not improving. Today he is day+118 from transplant. He is going to have a feeding tube placed. Meds will be changed to liquid form. Non-contrasted CT shows worsening. Not eating. Several loose stools. He's particularly miserable with the fevers. He is in bed quite a bit. He is receiving anti-emetics for nausea. He is fatigued. He looks deconditioned. Cough during coughing jag was a bit barky.      Transplants:  5/18/2020 (Heart), Postoperative day:  118.  Coordinator Angelika Muller    Review of Systems:  CONSTITUTIONAL:  Fever again.   EYES: no acute changes to vision.  ENT:  No acute changes to hearing. Had to blow his nose and there was a little blood tinge in the tissue, but no headache.   RESPIRATORY:  + productive cough, although sputum is clear.   CARDIOVASCULAR:  + chest pain along the sternum, prob  where his ribs attach (due to cough).  GASTROINTESTINAL:  zofran for nausea, + 2 diarrheal stools per day.   GENITOURINARY:  He usually has a little bit of dribbling at the beginning and end of his stream, no change over 3 weeks of antibacterial antibiotics. There was a touch of burning at the end of urination.   HEME:  No bleeding.  INTEGUMENT:  No rash.  NEURO:  Alert and oriented. No headache.         Current Medications & Allergies:       aspirin  81 mg Oral Daily     atovaquone  750 mg Oral Q12H CHANCE     calcium carbonate 600 mg-vitamin D 400 units  1 tablet Oral BID w/meals     cefdinir  300 mg Oral Q12H CHANCE     heparin ANTICOAGULANT  5,000 Units Subcutaneous Q8H     hydrALAZINE  25 mg Oral TID     insulin aspart   Subcutaneous Daily with lunch     insulin aspart  1-10 Units Subcutaneous TID w/meals     insulin aspart  1-7 Units Subcutaneous At Bedtime     insulin aspart   Subcutaneous QAM AC     insulin aspart   Subcutaneous Daily with supper     insulin glargine  30 Units Subcutaneous At Bedtime     lidocaine  1 patch Transdermal Q24h    And     lidocaine   Transdermal Q8H     [START ON 9/14/2020] multivitamins w/minerals  15 mL Per Feeding Tube Daily     mycophenolate  500 mg Oral BID IS     posaconazole  200 mg Oral Daily     rosuvastatin  10 mg Oral At Bedtime     sodium chloride (PF)  3 mL Intracatheter Q8H     tacrolimus  0.5 mg Oral BID IS     valGANciclovir  450 mg Oral Daily       Infusions/Drips:    dextrose       - MEDICATION INSTRUCTIONS -         No Known Allergies         Physical Exam:   Vitals were reviewed.  All vitals stable.  Patient Vitals for the past 24 hrs:   BP Temp Temp src Pulse Resp SpO2 Weight   09/13/20 1423 109/77 98  F (36.7  C) Oral 103 18 -- --   09/13/20 1255 -- -- -- -- -- 98 % --   09/13/20 1116 109/80 98.1  F (36.7  C) Oral 98 18 97 % --   09/13/20 0956 -- 99.7  F (37.6  C) Oral -- -- -- --   09/13/20 0846 -- 100.3  F (37.9  C) Oral -- -- -- --   09/13/20 0714 129/83 101.7   F (38.7  C) Oral 117 18 94 % --   09/13/20 0550 -- -- -- -- -- -- 78.6 kg (173 lb 3.2 oz)   09/13/20 0311 (!) 141/95 -- -- -- -- 93 % --   09/13/20 0308 (!) 143/93 99.9  F (37.7  C) Oral 126 16 91 % --   09/12/20 2358 107/80 98.8  F (37.1  C) Oral 115 18 92 % --   09/12/20 2150 -- 98.4  F (36.9  C) -- -- -- -- --   09/12/20 1935 (!) 133/94 101.8  F (38.8  C) Oral 119 18 95 % --   09/12/20 1525 117/87 98.9  F (37.2  C) Oral 117 18 98 % --     Ranges for vital signs:  Temp:  [98  F (36.7  C)-101.8  F (38.8  C)] 98  F (36.7  C)  Pulse:  [] 103  Resp:  [16-18] 18  BP: (107-143)/(77-95) 109/77  SpO2:  [91 %-98 %] 98 %  Vitals:    09/11/20 0250 09/12/20 0305 09/13/20 0550   Weight: 78.4 kg (172 lb 14.4 oz) 79 kg (174 lb 3.2 oz) 78.6 kg (173 lb 3.2 oz)       Physical Examination:  GENERAL:  well-developed, well-nourished man, in bed. He is resting on his right side. He had a non-productive coughing jag while I was in the room.  HEAD:  Head is normocephalic, atraumatic   EYES:  Eyes have anicteric sclerae  ENT:  Oropharynx is dry without ulcers. Mild thin exudate on tongue is from mouth breathing. Tongue is midline  NECK:  Supple.   LUNGS:  A few scattered crackles. When he coughs during exam, this is a musical rattle through his right chest, c/w air going past an obstruction.   CARDIOVASCULAR:  Tachycardic rate and rhythm with no murmur  ABDOMEN:  Normal bowel sounds, soft, nontender.   SKIN:  No acute rashes. PIV in place right forearm without any surrounding erythema or exudate.  NEUROLOGIC:  Grossly nonfocal. Active x4 extremities         Laboratory Data:     Absolute CD4   Date Value Ref Range Status   09/12/2020 359 (L) 441 - 2,156 cells/uL Final       Inflammatory Markers    Recent Labs   Lab Test 08/19/20  1759  07/23/18  0645   CRP  --   --  11.0*   PSA 10.80*   < >  --     < > = values in this interval not displayed.       Immune Globulin Studies  No lab results found.      Metabolic Studies       Recent  Labs   Lab Test 09/13/20  0804 09/13/20  0612 09/12/20  0545  09/01/20  0658 08/31/20  1817 08/31/20  1259 08/26/20  0939  08/19/20  0558  06/15/20  0826   NA  --  127* 128*   < > 134  --  130* 137   < > 139   < > 139   POTASSIUM  --  4.3 4.1   < > 3.8  --  4.0 4.1   < > 3.6   < > 4.8   CHLORIDE  --  95 98   < > 104  --  100 105   < > 107   < > 110*   CO2  --  19* 22   < > 22  --  22 25   < > 25   < > 21   ANIONGAP  --  13 8   < > 9  --  8 7   < > 6   < > 8   BUN  --  28 31*   < > 21  --  33* 41*   < > 27   < > 98*   CR  --  1.90* 1.82*   < > 1.38*  --  1.60* 1.91*   < > 1.31*   < > 1.89*   GFRESTIMATED  --  38* 40*   < > 56*  --  47* 38*   < > 60*   < > 38*   GLC  --  167* 201*   < > 118*  --  186* 164*   < > 69*   < > 120*   A1C  --   --   --   --   --   --   --   --   --  7.4*   < > 7.8*   ARLENE  --  8.6 8.9   < > 8.5  --  9.6 9.3   < > 8.4*   < > 9.0   PHOS  --   --   --   --   --   --   --  3.0  --   --    < > 4.4   MAG  --  1.6 2.2   < > 1.4*  --   --  1.7  --   --    < > 1.7   LACT 0.7  --   --   --  0.9  --  1.5  --   --   --    < >  --    PCAL  --   --   --   --   --   --  0.23  --   --   --    < >  --    FGTL  --   --   --   --   --  90  --   --    < >  --   --   --    CKT  --   --   --   --   --   --   --   --   --   --   --  132    < > = values in this interval not displayed.       Hepatic Studies    Recent Labs   Lab Test 09/12/20  1204 09/12/20  0545 09/05/20  0549 09/04/20  0543  09/01/20  0658   BILITOTAL  --  0.6 0.8 0.8   < > 0.9   DBIL  --  0.2  --   --   --   --    ALKPHOS  --  102 68 66   < > 62   PROTTOTAL  --  6.2* 6.6* 6.5*   < > 6.1*   ALBUMIN  --  1.9* 2.5* 2.6*   < > 2.4*   AST  --  85* 22 21   < > 16   ALT  --  47 15 12   < > 11   *  --   --   --   --  228*    < > = values in this interval not displayed.       Pancreatitis testing    Recent Labs   Lab Test 09/01/20  0658  05/18/20  0857 05/18/20  0010   AMYLASE  --   --  56 65   TRIG 143   < >  --   --     < > = values in this  interval not displayed.       Gout Labs      Recent Labs   Lab Test 08/21/20  0456 07/06/19  0355 06/26/19  0925 01/23/19  1027 07/23/18  0645   URIC 5.2 5.0 5.0 4.4 4.4       Hematology Studies      Recent Labs   Lab Test 09/13/20  0612 09/12/20  0545 09/11/20  0538 09/10/20  0546 09/09/20  0535 09/08/20  0548  09/05/20  0549 09/04/20  0543   WBC 9.0 7.9 6.9 6.8 6.0 7.1   < > 5.3 3.8*   ANEU  --   --   --   --   --   --   --  4.1 3.1   ALYM  --   --   --   --   --   --   --  0.8 0.3*   MARTHA  --   --   --   --   --   --   --  0.3 0.2   AEOS  --   --   --   --   --   --   --  0.0 0.2   HGB 7.2* 7.8* 7.5* 8.1* 7.6* 8.4*   < > 8.3* 8.7*   HCT 23.3* 25.0* 24.0* 25.8* 24.4* 26.6*   < > 26.5* 27.5*    437 368 371 340 361   < > 353 376    < > = values in this interval not displayed.       Clotting Studies    Recent Labs   Lab Test 08/26/20  0939 06/04/20  0900 06/01/20  0856 05/18/20  1835   INR 1.06 1.16* 1.16* 1.54*   PTT  --   --   --  31       Iron Testing    Recent Labs   Lab Test 09/13/20  0612  06/26/19  0925  07/23/18  0645   IRON  --   --  58  --  35   FEB  --   --  370  --  272   IRONSAT  --   --  16  --  13*   JOSE  --   --  17*  --  646*   MCV 86   < > 88   < > 93   B12  --   --  638  --  1,143*    < > = values in this interval not displayed.       Arterial Blood Gas Testing    Recent Labs   Lab Test 05/24/20  1137 05/21/20  0756 05/21/20  0445 05/20/20  2340  05/19/20  0943 05/19/20  0812 05/19/20  0324 05/19/20  0009  05/18/20  1835   PH  --   --   --   --   --  7.40 7.40 7.39 7.35  --  7.43   PCO2  --   --   --   --   --  27* 33* 38 39  --  33*   PO2  --   --   --   --   --  131* 124* 132* 153*  --  323*   HCO3  --   --   --   --   --  17* 21 23 21  --  22   O2PER 24.0 2L 2L 2L   < > 40 40  40 40  40 40  40   < > 100    < > = values in this interval not displayed.        Thyroid Studies     Recent Labs   Lab Test 06/26/19  0925 07/23/18  0645   TSH 3.94 6.91*   T4  --  1.05       Urine Studies      Recent Labs   Lab Test 08/31/20  1615 08/20/20  1630 08/18/20  1404 05/18/20  0135 05/12/20  2250  01/30/19  1255   URINEPH 5.5 5.5 5.5 5.0 5.0   < > 5.0   NITRITE Negative Negative Negative Negative Negative   < > Negative   LEUKEST Negative Small* Moderate* Negative Negative   < > Negative   WBCU 1 12* 28* 7* 3   < > 82*   UWBCLM  --   --   --   --   --   --  Present*    < > = values in this interval not displayed.       Medication levels    Recent Labs   Lab Test 09/13/20  0612  09/09/20  0535  05/27/20  0837   VANCOMYCIN  --   --   --   --  26.6*   PSCON  --   --  1.6  --   --    TACROL 9.3   < > 11.6   < >  --     < > = values in this interval not displayed.       Microbiology:  Last Culture results with specimen source  Culture Micro   Date Value Ref Range Status   09/09/2020 No growth after 2 days  Preliminary   09/09/2020 No growth after 2 days  Preliminary   09/09/2020 No growth after 4 days  Preliminary   09/09/2020 No growth after 4 days  Preliminary   09/08/2020   Preliminary    Culture received and in progress.  Positive AFB results are called as soon as detected.    Final report to follow in 7 to 8 weeks.     09/08/2020   Preliminary    Assayed at [facility] [address] [place] [address] [phone]   09/08/2020 Culture negative after 3 days  Preliminary   09/08/2020 Light growth  Normal respiratory simona    Final   09/08/2020   Preliminary    Culture received and in progress.  Positive AFB results are called as soon as detected.    Final report to follow in 7 to 8 weeks.     09/08/2020   Preliminary    Assayed at [facility] [address] [place] [address] [phone]   09/08/2020 Culture negative after 3 days  Preliminary   09/08/2020 No growth after 3 days  Preliminary   09/08/2020 Light growth  Normal respiratory simona    Final   09/07/2020 No growth  Final   09/07/2020 No growth  Final   09/02/2020 (A)  Final    Canceled, Test credited  >10 Squamous epithelial  cells/low power field indicates oral contamination. Please   recollect.     09/02/2020 Called to  Sasha Wheat RN at 0442 9/2/20 hg    Final   08/31/2020 Canceled, Test credited  Final   08/31/2020 (A)  Final    >10 Squamous epithelial cells/low power field indicates oral contamination. Please   recollect.     08/31/2020   Final    Notification of test cancellation was given to  Sasha Wheat RN @ 0133 9/1/20 TM.     08/31/2020 No growth  Final   08/31/2020 No growth  Final   08/20/2020 No growth  Final   08/20/2020 No yeast isolated  Final   08/20/2020 Light growth  Normal simona    Final   08/19/2020 No growth  Final   08/18/2020   Final    <10,000 colonies/mL  urogenital simona  Susceptibility testing not routinely done     08/18/2020 No growth  Final   08/18/2020 No growth  Final   05/21/2020 No growth  Final   05/21/2020 No growth  Final   05/18/2020 (A)  Final    On day 5, isolated in broth only:  Cutibacterium (Propionibacterium) acnes     05/18/2020   Final    Susceptibility testing of Cutibacterium is not done from this source. Our antibiogram   indicates that Cutibacterium species are susceptible to penicillin and cefotaxime, and   most are susceptible to clindamycin.  Sugar Jimenez M.D., Medical Director     05/18/2020 Culture negative after 4 weeks  Final   05/18/2020 (A)  Final    On day 4, isolated in broth only:  Cutibacterium (Propionibacterium) acnes  Susceptibility testing of Cutibacterium is not done from this source. Our antibiogram   indicates that Cutibacterium species are susceptible to penicillin and cefotaxime, and   most are susceptible to clindamycin.  Sugar Jimenez M.D., Medical Director     05/12/2020 No growth  Final   05/12/2020 No growth  Final   03/27/2019   Final    <10,000 colonies/mL  urogenital simona  Susceptibility testing not routinely done     02/11/2019 No growth  Final   01/30/2019 No growth  Final    Specimen Description   Date Value Ref Range Status    09/09/2020 Blood  Final   09/09/2020 Blood  Final   09/09/2020 Blood Left Hand  Final   09/09/2020 Blood Right Hand  Final   09/09/2020 Feces  Final   09/08/2020 Bronchial FINE NEEDLE ASPIRATION  Final   09/08/2020 Bronchial FINE NEEDLE ASPIRATION  Final   09/08/2020 Bronchial FINE NEEDLE ASPIRATION  Final   09/08/2020 Bronchoalveolar Lavage FINE NEEDLEL ASPIRATE  Final   09/08/2020 Bronchoalveolar Lavage FINE NEEDLE ASPIRATE  Final   09/08/2020 Bronchial lavage RIGHT LUNG  Final   09/08/2020 Bronchial lavage RIGHT LUNG  Final   09/08/2020 Bronchial lavage Right Lung SPCEIMEN 2  Final   09/08/2020 Bronchial lavage Right Lung SPECIMEN 2  Final   09/08/2020 Bronchial lavage Right Lung SPECIMEN 2  Final   09/08/2020 Bronchial lavage Right Lung SPECIMEN 2  Final   09/07/2020 Blood Left Arm  Final   09/07/2020 Blood Left Arm  Final   09/04/2020 Blood  Final   09/02/2020 Sputum  Final   09/02/2020 Sputum Screen  Final   09/01/2020 Urine Random  Final   09/01/2020 Blood  Final   09/01/2020 Feces  Final   09/01/2020 Urine  Final   08/31/2020 Throat  Final   08/31/2020 Sputum  Final   08/31/2020 Sputum  Final   08/31/2020 Blood Left Arm  Final   08/31/2020 Right Arm  Final   08/20/2020 Midstream Urine  Final   08/20/2020 Midstream Urine  Final   08/20/2020 Sputum  Final   08/20/2020 Sputum  Final   08/19/2020 Midstream Urine  Final   08/18/2020 Midstream Urine  Final   08/18/2020 Blood Left Arm  Final   08/18/2020 Blood Left Arm  Final   05/21/2020 Blood Left Arm  Final   05/21/2020 Blood Right Arm  Final        Last check of C difficile  C Diff Toxin B PCR   Date Value Ref Range Status   09/09/2020 Negative NEG^Negative Final     Comment:     Negative: C. difficile target DNA sequences NOT detected, presumed negative   for C.difficile toxin B or the number of bacteria present may be below the   limit of detection for the test.  FDA approved assay performed using [employer] GeneXpert real-time PCR.  A negative result does not  exclude actual disease due to C. difficile and may   be due to improper collection, handling and storage of the specimen or the   number of organisms in the specimen is below the detection limit of the assay.         Infection Studies to assess Diarrhea   Recent Labs   Lab Test 09/10/20  1415   EPSTX1 Not Detected   EPSTX2 Not Detected   EPCAMP Not Detected   EPSALM Not Detected   EPSHGL Not Detected   EPVIB Not Detected   EPROTA Not Detected   EPNORO Not Detected   EPYER Not Detected       Virology:  Respiratory virus testing    Recent Labs   Lab Test 08/31/20  1302 08/21/20  0520 08/19/20  1900  08/18/20  1235 05/29/20  1530   IFLUA Not Detected  --  Not Detected   < >  --   --    FLUAH1 Not Detected  --  Not Detected   < >  --   --    [id number] Not Detected  --  Not Detected   < >  --   --    FLUAH3 Not Detected  --  Not Detected   < >  --   --    IFLUB Not Detected  --  Not Detected   < >  --   --    PIV1 Not Detected  --  Not Detected  --   --   --    PIV2 Not Detected  --  Not Detected  --   --   --    PIV3 Not Detected  --  Not Detected  --   --   --    PIV4 Not Detected  --  Not Detected   < >  --   --    RSVA Not Detected  --  Not Detected   < >  --   --    RSVB Not Detected  --  Not Detected   < >  --   --    HMPV Not Detected  --  Not Detected  --   --   --    ADENOV Not Detected  --  Not Detected   < >  --   --    CORONA Not Detected  --  Not Detected   < >  --   --    [id number] Nasopharyngeal Nasopharyngeal  --   --  Nasopharyngeal Nasopharyngeal   SARSCOVRES NEGATIVE NEGATIVE  --   --  NEGATIVE NEGATIVE    < > = values in this interval not displayed.       Log IU/mL of CMVQNT   Date Value Ref Range Status   09/01/2020 Not Calculated <2.1 [Log_IU]/mL Final   08/19/2020 Not Calculated <2.1 [Log_IU]/mL Final   08/13/2020 Not Calculated <2.1 [Log_IU]/mL Final   06/15/2020 Not Calculated <2.1 [Log_IU]/mL Final       EBV DNA Copies/mL   Date Value Ref Range Status   09/01/2020 EBV DNA Not Detected EBVNEG^EBV  DNA Not Detected [Copies]/mL Final   08/13/2020 EBV DNA Not Detected EBVNEG^EBV DNA Not Detected [Copies]/mL Final   06/15/2020 EBV DNA Not Detected EBVNEG^EBV DNA Not Detected [Copies]/mL Final       Imaging:  Recent Results (from the past 48 hour(s))   CT Chest w/o Contrast    Narrative    EXAMINATION: CT CHEST W/O CONTRAST, 9/13/2020 9:29 AM    CLINICAL HISTORY: Cough, persistent fever    COMPARISON: Chest x-ray 9/11/2020, chest CT 9/2/2020.    TECHNIQUE: CT imaging obtained through the chest without contrast.  Coronal and axial MIP reformatted images obtained.     CONTRAST:  none.    FINDINGS: Cardiac transplantation. Normal heart size, trace  pericardial effusion. Normal caliber thoracic aorta and pulmonary  trunk. Normal branching of the thoracic great vessels. Normal thyroid.  No suspicious lower cervical or axillary lymphadenopathy. Prominent  right hilar lymphadenopathy. Fluid-filled, distended esophagus with  small hiatal hernia.    Dependent secretions in the trachea. Small right pleural effusion with  associated compressive atelectasis. The spiculated right hilar mass  has enlarged and currently measures 3.6 x 5.4 cm. There is associated  compression and narrowing upon the right upper lobe subsegmental  bronchioles. There is interlobular septal thickening of the right  upper and lower lobes with increased right basilar and right upper  lobe tree-in-bud nodular opacities. Scattered right basilar  groundglass opacities noted. Faint left lower lobe groundglass  opacities also seen. Multiple scattered pulmonary nodules measuring up  to 5 mm are also noted (for example series 6, image 89 on the right).     Evaluation of the upper abdomen is limited. Cholelithiasis without  cholecystitis. Mild diffuse hepatic hypoattenuation.    BONES: Sternotomy incision with persistent disunion. Wires are intact.  No acute or suspicious osseous abnormality.      Impression    IMPRESSION:  1. Increased size of the right hilar  mass/consolidation with  associated narrowing of the subsegmental right upper lobe bronchioles  and multiple scattered pulmonary nodules as above. Differential  continues to include infection versus malignancy with postobstructive  pneumonia.  2. Increased bibasilar groundglass and right lung tree-in-bud nodular  opacities suggestive of infection, possibly secondary to aspiration.  Small right pleural effusion.  3. Surgical changes of cardiac transplantation.  4. Cholelithiasis without cholecystitis.    I have personally reviewed the examination and initial interpretation  and I agree with the findings.    ANA JOY MD          EXAMINATION: CT CHEST W/O CONTRAST, 9/2/2020 4:23 PM  COMPARISON: CT chest 7/29/2020, CTA 1/30/2019, and CT 7/22/2018.   HISTORY: Cough, persistent  FINDINGS:  Chest: Postsurgical changes of heart transplant with unremarkable appearance  of the allograft and unchanged mild soft tissue density thickening  along anterior pericardium. Intact median sternotomy wires. Great  vessels are grossly normal in caliber. Blood pool is hypoattenuating  the myocardium. Central tracheobronchial tree is patent.  New spiculated right upper lobe mass vs consolidation along the  superior right major fissure with adjacent groundglass nodularity  peripherally. There is new right hilar mass vs lymphadenopathy with  narrowing of multiple right upper lobe subsegmental bronchi. Unchanged  smaller nodules in the anterior right upper lobe (series 6 image 100).  Remaining lungs are clear. No effusion or pneumothorax. Scattered  calcified granulomas are unchanged.  Abdomen:  Extensive cholelithiasis without CT evidence for cholecystitis.  Bones/soft tissues:   No suspicious osseous lesion or acute abnormality. Mild degenerative  changes of the thoracic spine.      Impression    IMPRESSION:   1. New mass vs consolidation in the right upper lobe and right hilum  since 7/29/2020 with narrowing of multiple right upper  lobe bronchi.   1a. Differential pneumonia with reactive right hilar lymphadenopathy  vs. malignancy(ex. Lymphoma/PTLD) with post obstructive pneumonia.  2. Stable additional pulmonary lesions as above.  3. Stable postsurgical changes of heart transplant.  4. Cholelithiasis without evidence of cholecystitis.       XR Chest Port 1 View    Narrative    Exam: XR CHEST PORT 1 VW, 9/7/2020 5:30 AM  Indication: pleuritic chest pain  Comparison: CT chest 9/2/2020, chest x-ray 9/1/2020  Findings:   Single portable view of the chest. Less prominent right superior hilar  opacity, better delineated on CT 9/2/2020. Cardiomediastinum and upper  abdomen are unchanged. No pneumothorax. No pleural effusion.    Impression    Impression:   1. No acute cardiopulmonary findings.  2. Less prominent right superior hilar opacity.

## 2020-09-13 NOTE — PROGRESS NOTES
Calorie Count  Intake recorded for: 9/12  Total Kcals: 0 Total Protein: 0g  Kcals from Hospital Food: 0   Protein: 0g  Kcals from Outside Food (average):0 Protein: 0g  # Meals Recorded: 1 meal ordered from kitchen, no intake recorded.   # Supplements Recorded: no intake recorded.

## 2020-09-13 NOTE — PROGRESS NOTES
CLINICAL NUTRITION SERVICES - BRIEF NOTE     Nutrition Prescription    Recommendations already ordered by Registered Dietitian (RD):  Once FT placed:     --Nutren 1.5 @ 45 mL/hr to provide 1620 kcals (25 kcal/kg/day), 73 g PRO (1.1 g/kg/day), 821 mL H2O, 190 g CHO and no Fiber daily.       --Start TF @ 15 ml/hr and advance by 10 ml q 8 hrs until goal rate.        --Do not start or advance TF rate unless K+/Mg++ >/= WNL and Phos > 1.9.        --Minimum 30 ml q 4 hrs water flushes for tube patency.       --HOB > 30 degrees for gastric feeding tube placement.       --Certavite daily.    Future/Additional Recommendations:  Monitor for FT placement and tolerance of TF.    Provider order: Registered Dietitian to order TF per Medical Nutrition Therapy Guidelines     NEW FINDINGS   - Feeding tube per Xray.   - Spoke w/ RN, unclear what time Xray will place FT today.     INTERVENTIONS  Implementation  Collaboration with other providers- Spoke w/ RN regarding plan for FT placement. Spoke w/ endo regarding plan to start TF once feeding tube in place.   Enteral Nutrition - Initiate    Monitoring/Evaluation  Will continue to monitor and evaluate per protocol.    Pat Flores, RD, LD  6C RD pager [phone]  Weekend RD pager [phone]

## 2020-09-14 ENCOUNTER — APPOINTMENT (OUTPATIENT)
Dept: GENERAL RADIOLOGY | Facility: CLINIC | Age: 60
End: 2020-09-14
Attending: NURSE PRACTITIONER
Payer: COMMERCIAL

## 2020-09-14 LAB
ALBUMIN UR-MCNC: 30 MG/DL
AMORPH CRY #/AREA URNS HPF: ABNORMAL /HPF
ANION GAP SERPL CALCULATED.3IONS-SCNC: 8 MMOL/L (ref 3–14)
APPEARANCE UR: ABNORMAL
BILIRUB UR QL STRIP: NEGATIVE
BUN SERPL-MCNC: 34 MG/DL (ref 7–30)
CALCIUM SERPL-MCNC: 8.3 MG/DL (ref 8.5–10.1)
CHLORIDE SERPL-SCNC: 99 MMOL/L (ref 94–109)
CMV DNA SPEC NAA+PROBE-ACNC: NORMAL [IU]/ML
CMV DNA SPEC NAA+PROBE-LOG#: NORMAL {LOG_IU}/ML
CO2 SERPL-SCNC: 22 MMOL/L (ref 20–32)
COLOR UR AUTO: YELLOW
CREAT SERPL-MCNC: 2.01 MG/DL (ref 0.66–1.25)
ERYTHROCYTE [DISTWIDTH] IN BLOOD BY AUTOMATED COUNT: 13.6 % (ref 10–15)
GFR SERPL CREATININE-BSD FRML MDRD: 36 ML/MIN/{1.73_M2}
GLUCOSE BLDC GLUCOMTR-MCNC: 182 MG/DL (ref 70–99)
GLUCOSE BLDC GLUCOMTR-MCNC: 185 MG/DL (ref 70–99)
GLUCOSE BLDC GLUCOMTR-MCNC: 235 MG/DL (ref 70–99)
GLUCOSE BLDC GLUCOMTR-MCNC: 237 MG/DL (ref 70–99)
GLUCOSE BLDC GLUCOMTR-MCNC: 277 MG/DL (ref 70–99)
GLUCOSE SERPL-MCNC: 205 MG/DL (ref 70–99)
GLUCOSE UR STRIP-MCNC: 30 MG/DL
HCT VFR BLD AUTO: 22.9 % (ref 40–53)
HGB BLD-MCNC: 7.2 G/DL (ref 13.3–17.7)
HGB UR QL STRIP: ABNORMAL
IGA SERPL-MCNC: 108 MG/DL (ref 84–499)
IGG SERPL-MCNC: 510 MG/DL (ref 610–1616)
IGM SERPL-MCNC: 29 MG/DL (ref 35–242)
KETONES UR STRIP-MCNC: NEGATIVE MG/DL
LABORATORY COMMENT REPORT: NORMAL
LEUKOCYTE ESTERASE UR QL STRIP: NEGATIVE
MAGNESIUM SERPL-MCNC: 1.7 MG/DL (ref 1.6–2.3)
MCH RBC QN AUTO: 27.3 PG (ref 26.5–33)
MCHC RBC AUTO-ENTMCNC: 31.4 G/DL (ref 31.5–36.5)
MCV RBC AUTO: 87 FL (ref 78–100)
NITRATE UR QL: NEGATIVE
PH UR STRIP: 5.5 PH (ref 5–7)
PHOSPHATE SERPL-MCNC: 3.6 MG/DL (ref 2.5–4.5)
PLATELET # BLD AUTO: 426 10E9/L (ref 150–450)
POTASSIUM SERPL-SCNC: 4.2 MMOL/L (ref 3.4–5.3)
PREALB SERPL IA-MCNC: 4 MG/DL (ref 15–45)
PSA FREE MFR SERPL: 10 %
PSA FREE SERPL-MCNC: 0.9 NG/ML
PSA SERPL-MCNC: 8.8 NG/ML (ref 0–4)
RBC # BLD AUTO: 2.64 10E12/L (ref 4.4–5.9)
RBC #/AREA URNS AUTO: 53 /HPF (ref 0–2)
SARS-COV-2 RNA SPEC QL NAA+PROBE: NEGATIVE
SARS-COV-2 RNA SPEC QL NAA+PROBE: NORMAL
SODIUM SERPL-SCNC: 129 MMOL/L (ref 133–144)
SOURCE: ABNORMAL
SP GR UR STRIP: 1.02 (ref 1–1.03)
SPECIMEN SOURCE: NORMAL
SQUAMOUS #/AREA URNS AUTO: <1 /HPF (ref 0–1)
TACROLIMUS BLD-MCNC: 10.7 UG/L (ref 5–15)
TME LAST DOSE: NORMAL H
UROBILINOGEN UR STRIP-MCNC: NORMAL MG/DL (ref 0–2)
WBC # BLD AUTO: 8 10E9/L (ref 4–11)
WBC #/AREA URNS AUTO: 4 /HPF (ref 0–5)

## 2020-09-14 PROCEDURE — 25000128 H RX IP 250 OP 636: Performed by: INTERNAL MEDICINE

## 2020-09-14 PROCEDURE — 81001 URINALYSIS AUTO W/SCOPE: CPT | Performed by: NURSE PRACTITIONER

## 2020-09-14 PROCEDURE — 36415 COLL VENOUS BLD VENIPUNCTURE: CPT | Performed by: NURSE PRACTITIONER

## 2020-09-14 PROCEDURE — 80197 ASSAY OF TACROLIMUS: CPT | Performed by: NURSE PRACTITIONER

## 2020-09-14 PROCEDURE — 25000125 ZZHC RX 250: Performed by: NURSE PRACTITIONER

## 2020-09-14 PROCEDURE — 94640 AIRWAY INHALATION TREATMENT: CPT

## 2020-09-14 PROCEDURE — 87086 URINE CULTURE/COLONY COUNT: CPT | Performed by: NURSE PRACTITIONER

## 2020-09-14 PROCEDURE — 21400000 ZZH R&B CCU UMMC

## 2020-09-14 PROCEDURE — 25000128 H RX IP 250 OP 636: Performed by: NURSE PRACTITIONER

## 2020-09-14 PROCEDURE — 87252 VIRUS INOCULATION TISSUE: CPT | Performed by: NURSE PRACTITIONER

## 2020-09-14 PROCEDURE — 40000275 ZZH STATISTIC RCP TIME EA 10 MIN

## 2020-09-14 PROCEDURE — 25000132 ZZH RX MED GY IP 250 OP 250 PS 637: Performed by: INTERNAL MEDICINE

## 2020-09-14 PROCEDURE — 25000132 ZZH RX MED GY IP 250 OP 250 PS 637: Performed by: NURSE PRACTITIONER

## 2020-09-14 PROCEDURE — 25000131 ZZH RX MED GY IP 250 OP 636 PS 637: Performed by: NURSE PRACTITIONER

## 2020-09-14 PROCEDURE — 84100 ASSAY OF PHOSPHORUS: CPT | Performed by: NURSE PRACTITIONER

## 2020-09-14 PROCEDURE — 85027 COMPLETE CBC AUTOMATED: CPT | Performed by: NURSE PRACTITIONER

## 2020-09-14 PROCEDURE — U0003 INFECTIOUS AGENT DETECTION BY NUCLEIC ACID (DNA OR RNA); SEVERE ACUTE RESPIRATORY SYNDROME CORONAVIRUS 2 (SARS-COV-2) (CORONAVIRUS DISEASE [COVID-19]), AMPLIFIED PROBE TECHNIQUE, MAKING USE OF HIGH THROUGHPUT TECHNOLOGIES AS DESCRIBED BY CMS-2020-01-R: HCPCS

## 2020-09-14 PROCEDURE — 99233 SBSQ HOSP IP/OBS HIGH 50: CPT | Performed by: NURSE PRACTITIONER

## 2020-09-14 PROCEDURE — G0463 HOSPITAL OUTPT CLINIC VISIT: HCPCS

## 2020-09-14 PROCEDURE — 40000274 ZZH STATISTIC RCP CONSULT EA 30 MIN

## 2020-09-14 PROCEDURE — 25000125 ZZHC RX 250: Performed by: INTERNAL MEDICINE

## 2020-09-14 PROCEDURE — 00000146 ZZHCL STATISTIC GLUCOSE BY METER IP

## 2020-09-14 PROCEDURE — 80048 BASIC METABOLIC PNL TOTAL CA: CPT | Performed by: NURSE PRACTITIONER

## 2020-09-14 PROCEDURE — 25000132 ZZH RX MED GY IP 250 OP 250 PS 637: Performed by: STUDENT IN AN ORGANIZED HEALTH CARE EDUCATION/TRAINING PROGRAM

## 2020-09-14 PROCEDURE — 83735 ASSAY OF MAGNESIUM: CPT | Performed by: NURSE PRACTITIONER

## 2020-09-14 PROCEDURE — 27210429 ZZH NUTRITION PRODUCT INTERMEDIATE LITER

## 2020-09-14 PROCEDURE — 44500 INTRO GASTROINTESTINAL TUBE: CPT

## 2020-09-14 RX ORDER — PIPERACILLIN SODIUM, TAZOBACTAM SODIUM 4; .5 G/20ML; G/20ML
4.5 INJECTION, POWDER, LYOPHILIZED, FOR SOLUTION INTRAVENOUS EVERY 6 HOURS
Status: DISCONTINUED | OUTPATIENT
Start: 2020-09-14 | End: 2020-09-14

## 2020-09-14 RX ORDER — SODIUM CHLORIDE FOR INHALATION 3 %
3 VIAL, NEBULIZER (ML) INHALATION
Status: DISCONTINUED | OUTPATIENT
Start: 2020-09-14 | End: 2020-09-14

## 2020-09-14 RX ORDER — PIPERACILLIN SODIUM, TAZOBACTAM SODIUM 3; .375 G/15ML; G/15ML
3.38 INJECTION, POWDER, LYOPHILIZED, FOR SOLUTION INTRAVENOUS EVERY 6 HOURS
Status: DISCONTINUED | OUTPATIENT
Start: 2020-09-14 | End: 2020-09-16

## 2020-09-14 RX ORDER — LIDOCAINE HYDROCHLORIDE 20 MG/ML
5 SOLUTION OROPHARYNGEAL ONCE
Status: COMPLETED | OUTPATIENT
Start: 2020-09-14 | End: 2020-09-14

## 2020-09-14 RX ORDER — SODIUM CHLORIDE FOR INHALATION 3 %
3 VIAL, NEBULIZER (ML) INHALATION 4 TIMES DAILY
Status: DISCONTINUED | OUTPATIENT
Start: 2020-09-14 | End: 2020-09-14

## 2020-09-14 RX ORDER — SODIUM CHLORIDE FOR INHALATION 3 %
3 VIAL, NEBULIZER (ML) INHALATION 4 TIMES DAILY PRN
Status: DISCONTINUED | OUTPATIENT
Start: 2020-09-14 | End: 2020-09-24 | Stop reason: HOSPADM

## 2020-09-14 RX ADMIN — VALGANCICLOVIR 450 MG: 450 TABLET, FILM COATED ORAL at 08:18

## 2020-09-14 RX ADMIN — MYCOPHENOLATE MOFETIL 500 MG: 500 TABLET ORAL at 17:35

## 2020-09-14 RX ADMIN — PIPERACILLIN SODIUM AND TAZOBACTAM SODIUM 2.25 G: 2; .25 INJECTION, POWDER, LYOPHILIZED, FOR SOLUTION INTRAVENOUS at 11:02

## 2020-09-14 RX ADMIN — Medication 1 TABLET: at 17:35

## 2020-09-14 RX ADMIN — TACROLIMUS 0.5 MG: 0.5 CAPSULE ORAL at 08:18

## 2020-09-14 RX ADMIN — ATOVAQUONE 750 MG: 750 SUSPENSION ORAL at 08:27

## 2020-09-14 RX ADMIN — ALBUTEROL SULFATE 2.5 MG: 2.5 SOLUTION RESPIRATORY (INHALATION) at 15:55

## 2020-09-14 RX ADMIN — PIPERACILLIN SODIUM AND TAZOBACTAM SODIUM 2.25 G: 2; .25 INJECTION, POWDER, LYOPHILIZED, FOR SOLUTION INTRAVENOUS at 03:07

## 2020-09-14 RX ADMIN — MAGNESIUM SULFATE IN WATER 2 G: 40 INJECTION, SOLUTION INTRAVENOUS at 08:14

## 2020-09-14 RX ADMIN — LIDOCAINE HYDROCHLORIDE 10 ML: 20 SOLUTION ORAL; TOPICAL at 11:46

## 2020-09-14 RX ADMIN — ATOVAQUONE 750 MG: 750 SUSPENSION ORAL at 19:30

## 2020-09-14 RX ADMIN — PIPERACILLIN AND TAZOBACTAM 3.38 G: 3; .375 INJECTION, POWDER, FOR SOLUTION INTRAVENOUS at 17:38

## 2020-09-14 RX ADMIN — INSULIN ASPART 1 UNITS: 100 INJECTION, SOLUTION INTRAVENOUS; SUBCUTANEOUS at 08:29

## 2020-09-14 RX ADMIN — TACROLIMUS 0.5 MG: 0.5 CAPSULE ORAL at 17:35

## 2020-09-14 RX ADMIN — HEPARIN SODIUM 5000 UNITS: 5000 INJECTION, SOLUTION INTRAVENOUS; SUBCUTANEOUS at 08:14

## 2020-09-14 RX ADMIN — PIPERACILLIN AND TAZOBACTAM 3.38 G: 3; .375 INJECTION, POWDER, FOR SOLUTION INTRAVENOUS at 22:34

## 2020-09-14 RX ADMIN — PROCHLORPERAZINE EDISYLATE 5 MG: 5 INJECTION INTRAMUSCULAR; INTRAVENOUS at 08:39

## 2020-09-14 RX ADMIN — ASPIRIN 81 MG CHEWABLE TABLET 81 MG: 81 TABLET CHEWABLE at 08:18

## 2020-09-14 RX ADMIN — Medication 25 MG: at 19:30

## 2020-09-14 RX ADMIN — Medication 25 MG: at 08:18

## 2020-09-14 RX ADMIN — ROSUVASTATIN CALCIUM 10 MG: 10 TABLET, FILM COATED ORAL at 21:21

## 2020-09-14 RX ADMIN — Medication 1 TABLET: at 08:18

## 2020-09-14 RX ADMIN — SODIUM CHLORIDE SOLN NEBU 3% 3 ML: 3 NEBU SOLN at 15:55

## 2020-09-14 RX ADMIN — MYCOPHENOLATE MOFETIL 500 MG: 500 TABLET ORAL at 08:18

## 2020-09-14 ASSESSMENT — ACTIVITIES OF DAILY LIVING (ADL)
ADLS_ACUITY_SCORE: 15

## 2020-09-14 NOTE — PLAN OF CARE
OT 6C. Cancel. Pt with other providers upon 1st attempt and eating upon 2nd attempt. Will reschedule per POC.

## 2020-09-14 NOTE — PROGRESS NOTES
"Interventional Pulmonary Progress Note    Subjective:  Patient is feeling worse since admission. Is having more productive cough. Is still having fevers. Not sure if his shortness of breath has changed. Denies any hemoptysis.     4 point ROS performed, jncluding GI, constitutional, CV, and respiratory, and negative except for above.    Objective  /73 (BP Location: Left arm)   Pulse 99   Temp 98  F (36.7  C) (Oral)   Resp 18   Ht 1.626 m (5' 4\")   Wt 78.6 kg (173 lb 3.2 oz)   SpO2 94%   BMI 29.73 kg/m   on 2L  General: Sitting in bed  HEENT: No scleral icterus, NCAT  Lungs: Rhonchi over right lung fields, clear on left  Abd: Soft, nondistended  Neuro: Answering questions appropriately  Psych: Normal affect    Labs reviewed  Na 129 Cr 2.04  WBC 8.0 Hgb 7.2 Plt 426    Assessment and Recommendations:  Mariusz Sharp is a 59 year old with history of ICM s/p heart transplant 5/18/20, pAF, and DMII admitted on 8/31/2020 with syncope, coughing, and fever. Has been feeling short of breath and fatigued for 4 weeks.     CT Chest shows new RUL consolidation that is new from CT Chest performed on 7/29/2020. Bronchoscopy with BAL of RUL, endobronchial biopsy, and EBUS-TBNA of station 11R performed 9/8/20 with rare pseudohyphae on cytology, but no organism identified on cultures.  TBNA and biopsy were negative for malignancy. Interval CT Chest performed 8/13/20shows increased size of RUL consolidation despite antibiotic/antifungal treatment.     RUL consolidation with fever and SOB, worsened  Hx of heart transplant      Plan for bronchoscopy tomorrow with BAL and biopsy of endobronchial lesion. Please make NPO after midnight and hold AM dose of subcutaneous heparin.    Infectious disease has brought up the question of whether surgical biopsy would be beneficial, discussed with cardiology who would like to hold on thoracic consult for now.      Patient seen and discussed with Dr. Mercedes.    Sasha " Ovidio  Interventional Pulmonary Fellow  [phone]

## 2020-09-14 NOTE — SUMMARY OF CARE
-Pt began the day with high fevers, lethargy and misery.  -Pt's fever subsided and the Pt agreed to participate in a shower.  -Pt was started in IVF of NS at 100 ml per hour via PIV.  -Pt had a chest CT performed which showed a increase in  The size of his lung mass.  -Orders were received to begin the Pt on tube feeding as soom as an NJ tube can be placed in  X-ray. It is anticipated to happen tomorrow.  -Pt had two medium sized, soft, formed, light brown, emergent bowel movements.

## 2020-09-14 NOTE — PROGRESS NOTES
"Calorie Count    Intake recorded for: 9/13/2020  Total Kcals: 125 Total Protein: 7g    Kcals from Hospital Food: 125   Protein: 7g    Kcals from Outside Food (average):0 Protein: 0g    # Meals Recorded: 2 meals ordered from kitchen. No intake recorded from these meals.     # Supplements Recorded: 50% 1 Boost Glucose Control    Calorie count sheet shows \"1/2 Boost Breeze.\" Pt does not have supplement order for Boost Breeze, and did not receive any of this supplement from kitchen 9/13 - pt does have order for Boost Glucose Control, so calorie/protein calculations based on that.             "

## 2020-09-14 NOTE — PLAN OF CARE
D: Admitted 8/31 with syncope, SOB, cough, and fever. Found to have mass on RUL.   Hx: HTN, CAD, CKD3, hx of LV thrombus, PAF, DM2, ICM s/p LVAD HM3 with subsequent heart tx 5/18/20.     I: Monitored vitals and assessed pt status.   Running: intermittent abx      A: A0x4. VSS on 2.5L NC. Tele shows ST, rate 100-130. TMax 99.3. Pt reported generalized pain and back pain, relieved by scheduled lidocaine patch, declined any other interventions. Intermittent nausea, but declined antiemetics. Frequent, productive, congestive cough. BG elevated overnight (237). Slept between cares.      P: Continue to monitor Pt status and report changes to Cards 2.     [id number]  Marcy López RN on 9/14/2020 at 6:36 AM

## 2020-09-14 NOTE — PLAN OF CARE
D: progressive SOB, cough, fever, RUL mass; PMH of CAD, LV thrombus, CKD stage III, PAF, DM2, ICM s/p HM III LVAD now s/p OHT on 5/18/2020.  I/A: A&Ox4, lethargic. VSS on 2 LPM via oxymask, SR/ST on tele. BG q4h. TF started at 1330 @ 15 mL/hr via ND. Mg replaced per protocol, needs urine collected for ordered labs. IV abx as ordered. Encouraged to void with urinal, incontinent/loose BM x1.   P: Plan to be NPO @ midnight for bronchoscopy tomorrow. Continue to follow POC and contact Cards 2 with updates/changes.

## 2020-09-14 NOTE — PROGRESS NOTES
[facility]  Transplant Infectious Disease Progress Note     Patient:  Mariusz Sharp, Date of birth 1960, Medical record number [id number]  Date of Visit:  09/14/2020         Assessment and Recommendations:   Recommendations:  - Continue empiric Zosyn through 9/19/20 to cover both potential post-obstructive (or aspiration) right lung pneumonia, as well as the ongoing (previously cefdinir) treatment for chronic prostatitis (with treatment of that planned through 10/1/20).   - Await the posaconazole level from 9/13/20 before re-dosing it -- will also give another 24 hours to see if there is an appreciable improvement in his nausea from holding it.  - Agree with the planned repeat bronchoscopy and transbronchial biopsy by Pulmonary Consult tomorrow.  Would try to obtain substantial tissue for fungal / AFB / aerobic / anaerobic / Nocardia / Actinomyces cultures as well as histopathology including Lori and GMS stains (and perhaps FACS analysis) .  - Continue Valcyte prophylaxis.  - Can discontinue atovaquone, given the 9/12/20 absolute CD4+ cell count of 359.    The case was discussed with the Cardiology 2 and Pulmonary Consult services today.  Transplant ID will follow with you.    Edson Farrell MD  Pager [phone]    Assessment:  A 59 year old gentleman immunosuppressed (MMF, prednisone; initial basiliximab induction) s/p a 5/18/20 heart transplant for ischemic cardiomyopathy (following 6/18 STEMI and 12/31/18 LVAD) who also has a history of chronic kidney disease, paroxysmal atrial fibrillation, hypertension, and type 2 diabetes mellitus.    ID issues:    - Fever since 9/7/20, higher on 9/11 - 13/20, etiology uncertain / right lung hilar mass, parenchymal lung changes:  Most likely, the fever source remains the lungs.  A 9/13/20 chest CT showed increasing size of the right hilar mass/consolidation with associated narrowing of the subsegmental right upper lobe bronchioles, rasing  the possibilities of both a malignancy- associated fever, an inadequately treated fungal or other infectious phlegmon, or a new post-obstructive pneumonia.  The CT also showed multiple scattered pulmonary nodules.  The differential diagnosis of the mass and nodules continues to include chronic infection versus malignancy with postobstructive pneumonia. The 9/13/20 chest CT also showed increased bibasilar groundglass and right lung tree-in-bud nodular opacities rasing the possibility of an aspiration pneumonia, so we will treat with a seven day (9/12 - 19/20) empiric course of Zosyn to cover for aspiration or post-obstructive pneumonias causing the fevers.  He remains on empiric posaconazole for the RUL hilar mass while we are hoping for a definitive diagnosis.   A 9/12/20 absolute CD4+ cell count was 359 and his 9/8/20 BAL cytopathology was negative, making PJP quite unlikely, despite his mildly increased serum LDH of 228 on 9/1/20 -- atovaquone given since 9/12/20 can be discontinued.  Beyond the lungs, he lacks little else in suggestive signs or symptoms for infection elsewhere, including negative blood cultures on 9/7/20 x 2 and 9/9/20 x 2.     - Mass vs consolidation in the right upper lobe and right hilum since 7/29/2020 with narrowing of multiple right upper lobe bronchi:   The differential diagnosis has been pneumonia with reactive right hilar lymphadenopathy versus malignancy (such as lymphoma / PTLD with post-obstructive pneumonia, although his blood EBV PCR has been negative). Empiric coverage has been given since 9/1/20 for fungal pneumonias with posaconazole. The fine-needle biopsy of a bronchial lesion 9/8/20 was non-diagnostic, although a fungal rDNA 28S PCR test is still pending. That 9/8/20 BAL has only 116 WBC in the BAL fluid. Histoplasma and Blastomyces antigen assays were negative on 9/4/20.  A 9/2/20 PJP PCR of sputum was negative. An 8/31/20 Fungitell assay was low-grade stably positive at 90  but a corresponding galactomannan antigen assay was negative, as were 9/1/20 and 9/4/20 cryptococcal antigen assays.    Nausea:  He had a 9/9/20 posaconazole level of 1.6, on 300 mg daily, but his AST then sonali to 85 on 9/12/20, so the posaconazole dose was decreased to 200 mg daily on 9/13/20 and is being held for 48 hours to see if his nausea improves (with a repeat posaconazole level pending).    - Enlarged prostate by CT imaging in the past, combined with elevated PSA, chronic prostatitis:  The 8/19/2020 PSA was 10.8. UCx neg, including post-prostate massage sample obtained on 8/20/2020. WBC in UA have decreased since he started a 6-week antibiotic course of cefdinir (which was due to end 10/1/20).  On the cefdinir, he has noticed no real change to his dribbling symptom. A repeat 9/13/2020 PSA is pending.    - Diarrhea, mild. Stool testing neg 9/10/2020 for enteric bacteria, enteric viruses, and C difficile.    Old ID issues:  - Hx of Rhinovirus shedding 8/19/2020. Neg on NP swab 8/31/2020.   - Hx of Donor BCx grew S anginosus and Veillonella sp in 1/2 sets on 5/16/20 (suspected contaminant) treated with 7 days antibiotics.  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    Other ID issues:  - PCP prophylaxis: Bactrim was discontinued 5/27/20 due to high potassium levels, and nebulized pentamidine was given in its place 6/1/2020, 6/30/2020, and 7/29/2020. Breakthrough pneumocystis infections can occur when nebulized pentamidine is used, delicia in the upper lobes, so is can be misleading that he had 9/2/2020 PJP PCR of sputum neg and 9/8/2020 BAL cytology neg for PJP. Would not use dapsone as he is already anemic. Atovaquone started 9/12/2020, 750 mg BID, but since his 9/12/20 absolute CD4+ cell count is quite good at 359, that can be stopped now.  - QTc interval: 448 msec on 9/9/2020 EKG  - Viral serostatus & prophylaxis: CMV D+/R-, EBV D-/R+, HSV1?/2?, VZV +,  Toxo D-/R-; Valcyte  - Immunization status: he will be due for seasonal influenza, once the vaccine is available.  - Gamma globulin status: Serum IgG was mildly low at 510 on 9/13/20 -- no repletion is presently indicated.  - Isolation status: Routine.         Interval History:   Mr. Sharp seems to be less febrile (T max 99.4 degrees) over the past 24 hours after spiking higher fevers (to 101.8 degrees F) 9/11/20 midnight and 9/12/20 overnight.  His peripheral WBC remains quite stable in the 6 - 9K range over the past week+.  He is now on Zosyn (switched for the possibility of a post-obstructive pneumonia on 9/12/20 from cefdinir 9/1 - 12/20 which he is on through 10/1/20 for chronic prostatitis), empiric posaconazole (but on hold since yesterday for nausea while awaiting a repeat level), universal Valcyte prophylaxis, and atovaquone (added 9/12/20).  He is day+119 from transplant.  He feels better without the fevers and chills today, but has ongoing dyspnea and the same productive cough.  A feeding tube was placed yesterday because of anorexia and marked nausea which persist (only partially controlled by anti-emetics), along with loose stools.  His serum allbumin is down to 1.9 (on 9/12/20).  He has ongoing fatigue, malaise, and generalized weakness, but lacks new EENT symptoms, chest pain, rash, or limb edema.    The 9/8/20 attempt a biopsy of the RUL mass was unsuccessful and endobronchial biopsies obtained then were non-diagnostic, showing non-inflammatory, non-granulomatous tissue with a negative GMS stain.  The 9/13/20 repeat chest CT scan showed an increased size of the right hilar mass, so a repeat bronchoscopy with repeat attempt at transbronchial biopsy is being considered for tomorrow by Pulmonary Consult.  The CT showed bronchial impingement, rasing the possibility of a post-obstructive pneumonia (so he was placed on Zosyn yesterday).  A 9/13/20 repeat plasma CMV PCR viral load was undetectable.  A  9/13/20 serum IgA was normal but he is mildly hypogammaglobulinemic with a serum IgG of 510 and a serum IgM of 29.  A repeat PSA level is pending as is the 9/13/20 posaconazole level.  A 9/12/20 AST was elevated at 85.  A 9/12/20 absolute CD4+ cell count was 359, making PJP quite unlikely.     Transplants:  5/18/2020 (Heart), Postoperative day:  119.  Coordinator Angelika Muller    Review of Systems:  CONSTITUTIONAL:  Fevers on 9/13/20.  Fatigue, anorexia, malaise, generalized weakness.  EYES: no acute changes to vision.  ENT:  No acute changes to hearing. Had to blow his nose and there was a little blood tinge in the tissue, but no headache.   RESPIRATORY:  + productive cough, although sputum is clear.   CARDIOVASCULAR:  + chest pain along the sternum, prob where his ribs attach (due to cough).  GASTROINTESTINAL:  zofran for nausea, + 2 diarrheal stools per day.   GENITOURINARY:  He usually has a little bit of dribbling at the beginning and end of his stream, no change over 3 weeks of antibacterial antibiotics. There was a touch of burning at the end of urination.   HEME:  No bleeding.  SKIN:  No rash.  NEURO:  Alert and oriented. No headache.         Current Medications & Allergies:       aspirin  81 mg Oral Daily     atovaquone  750 mg Oral Q12H CHANCE     calcium carbonate 600 mg-vitamin D 400 units  1 tablet Oral BID w/meals     heparin ANTICOAGULANT  5,000 Units Subcutaneous Q8H     hydrALAZINE  25 mg Oral TID     insulin aspart   Subcutaneous Daily with lunch     insulin aspart  1-10 Units Subcutaneous TID w/meals     insulin aspart  1-7 Units Subcutaneous At Bedtime     insulin aspart   Subcutaneous QAM AC     insulin aspart   Subcutaneous Daily with supper     insulin isophane human  16 Units Subcutaneous BID     lidocaine  1 patch Transdermal Q24h    And     lidocaine   Transdermal Q8H     multivitamins w/minerals  15 mL Per Feeding Tube Daily     mycophenolate  500 mg Oral BID IS      piperacillin-tazobactam  3.375 g Intravenous Q6H     rosuvastatin  10 mg Oral At Bedtime     sodium chloride  3 mL Nebulization 4x Daily     sodium chloride (PF)  3 mL Intracatheter Q8H     tacrolimus  0.5 mg Oral BID IS     valGANciclovir  450 mg Oral Daily       Infusions/Drips:    dextrose       - MEDICATION INSTRUCTIONS -         No Known Allergies         Physical Exam:   Vitals were reviewed.  All vitals stable.  Patient Vitals for the past 24 hrs:   BP Temp Temp src Pulse Resp SpO2   09/14/20 1155 125/73 98  F (36.7  C) Oral 99 18 94 %   09/14/20 0803 124/84 98.3  F (36.8  C) Oral 106 16 96 %   09/14/20 0303 (!) 130/93 -- -- -- -- --   09/14/20 0300 (!) 157/101 99.3  F (37.4  C) Oral 119 16 95 %   09/13/20 2318 119/81 98.6  F (37  C) Oral 108 18 95 %   09/13/20 1926 135/86 98.9  F (37.2  C) Oral 129 18 92 %   09/13/20 1713 -- 99.4  F (37.4  C) Oral -- -- --   09/13/20 1557 -- 98  F (36.7  C) Oral 111 -- --   09/13/20 1519 113/72 98.3  F (36.8  C) Oral 111 18 91 %     Ranges for vital signs over the past 24 hours:   Temp:  [98  F (36.7  C)-99.4  F (37.4  C)] 98  F (36.7  C)  Pulse:  [] 99  Resp:  [16-18] 18  BP: (113-157)/() 125/73  SpO2:  [91 %-96 %] 94 %  Vitals:    09/11/20 0250 09/12/20 0305 09/13/20 0550   Weight: 78.4 kg (172 lb 14.4 oz) 79 kg (174 lb 3.2 oz) 78.6 kg (173 lb 3.2 oz)     Intake/Output Summary (Last 24 hours) at 9/14/2020 1511  Last data filed at 9/14/2020 1330  Gross per 24 hour   Intake 160 ml   Output 950 ml   Net -790 ml     Physical Examination:  GENERAL:  well-developed, well-nourished man, in bed. He is resting on his right side. He had a non-productive coughing jag while I was in the room.  HEAD:  Head is normocephalic, atraumatic   EYES:  Eyes have anicteric sclerae  ENT:  Oropharynx is dry without ulcers. Mild thin exudate on tongue is from mouth breathing. Tongue is midline  NECK:  Supple.   LUNGS:  A few scattered crackles. When he coughs during exam, this is a  musical rattle through his right chest, c/w air going past an obstruction.   CARDIOVASCULAR:  RRR with no murmur.  ABDOMEN:  Normal bowel sounds, soft, nontender.   SKIN:  No acute rashes. PIV in place right forearm without any surrounding erythema or exudate.  NEUROLOGIC:  Grossly nonfocal. Active x4 extremities         Laboratory Data:     Absolute CD4   Date Value Ref Range Status   09/12/2020 359 (L) 441 - 2,156 cells/uL Final     Inflammatory Markers    Recent Labs   Lab Test 08/19/20  1759  07/23/18  0645   CRP  --   --  11.0*   PSA 10.80*   < >  --     < > = values in this interval not displayed.     Immune Globulin Studies    Recent Labs   Lab Test 09/13/20  0612   *   IGM 29*        Metabolic Studies       Recent Labs   Lab Test 09/14/20  0546 09/13/20  0804 09/13/20  0612  09/01/20  0658 08/31/20  1817 08/31/20  1259  08/19/20  0558  06/15/20  0826   *  --  127*   < > 134  --  130*   < > 139   < > 139   POTASSIUM 4.2  --  4.3   < > 3.8  --  4.0   < > 3.6   < > 4.8   CHLORIDE 99  --  95   < > 104  --  100   < > 107   < > 110*   CO2 22  --  19*   < > 22  --  22   < > 25   < > 21   ANIONGAP 8  --  13   < > 9  --  8   < > 6   < > 8   BUN 34*  --  28   < > 21  --  33*   < > 27   < > 98*   CR 2.01*  --  1.90*   < > 1.38*  --  1.60*   < > 1.31*   < > 1.89*   GFRESTIMATED 36*  --  38*   < > 56*  --  47*   < > 60*   < > 38*   *  --  167*   < > 118*  --  186*   < > 69*   < > 120*   A1C  --   --   --   --   --   --   --   --  7.4*   < > 7.8*   ARLENE 8.3*  --  8.6   < > 8.5  --  9.6   < > 8.4*   < > 9.0   PHOS 3.6  --   --   --   --   --   --    < >  --    < > 4.4   MAG 1.7  --  1.6   < > 1.4*  --   --    < >  --    < > 1.7   LACT  --  0.7  --   --  0.9  --  1.5  --   --    < >  --    PCAL  --   --   --   --   --   --  0.23  --   --    < >  --    FGTL  --   --   --   --   --  90  --    < >  --   --   --    CKT  --   --   --   --   --   --   --   --   --   --  132    < > = values in this  interval not displayed.     Hepatic Studies    Recent Labs   Lab Test 09/12/20  1204 09/12/20  0545 09/05/20  0549 09/04/20  0543  09/01/20  0658   BILITOTAL  --  0.6 0.8 0.8   < > 0.9   DBIL  --  0.2  --   --   --   --    ALKPHOS  --  102 68 66   < > 62   PROTTOTAL  --  6.2* 6.6* 6.5*   < > 6.1*   ALBUMIN  --  1.9* 2.5* 2.6*   < > 2.4*   AST  --  85* 22 21   < > 16   ALT  --  47 15 12   < > 11   *  --   --   --   --  228*    < > = values in this interval not displayed.     Pancreatitis testing    Recent Labs   Lab Test 09/01/20  0658  05/18/20  0857 05/18/20  0010   AMYLASE  --   --  56 65   TRIG 143   < >  --   --     < > = values in this interval not displayed.     Gout Labs      Recent Labs   Lab Test 08/21/20  0456 07/06/19  0355 06/26/19  0925 01/23/19  1027 07/23/18  0645   URIC 5.2 5.0 5.0 4.4 4.4     Hematology Studies      Recent Labs   Lab Test 09/14/20  0546 09/13/20  0612 09/12/20  0545 09/11/20  0538 09/10/20  0546 09/09/20  0535  09/05/20  0549 09/04/20  0543   WBC 8.0 9.0 7.9 6.9 6.8 6.0   < > 5.3 3.8*   ANEU  --   --   --   --   --   --   --  4.1 3.1   ALYM  --   --   --   --   --   --   --  0.8 0.3*   MARTHA  --   --   --   --   --   --   --  0.3 0.2   AEOS  --   --   --   --   --   --   --  0.0 0.2   HGB 7.2* 7.2* 7.8* 7.5* 8.1* 7.6*   < > 8.3* 8.7*   HCT 22.9* 23.3* 25.0* 24.0* 25.8* 24.4*   < > 26.5* 27.5*    432 437 368 371 340   < > 353 376    < > = values in this interval not displayed.     Clotting Studies    Recent Labs   Lab Test 08/26/20  0939 06/04/20  0900 06/01/20  0856 05/18/20  1835   INR 1.06 1.16* 1.16* 1.54*   PTT  --   --   --  31     Iron Testing    Recent Labs   Lab Test 09/14/20  0546  06/26/19  0925  07/23/18  0645   IRON  --   --  58  --  35   FEB  --   --  370  --  272   IRONSAT  --   --  16  --  13*   JOSE  --   --  17*  --  646*   MCV 87   < > 88   < > 93   B12  --   --  638  --  1,143*    < > = values in this interval not displayed.     Arterial Blood Gas  Testing    Recent Labs   Lab Test 05/24/20  1137 05/21/20  0756 05/21/20  0445 05/20/20  2340  05/19/20  0943 05/19/20  0812 05/19/20  0324 05/19/20  0009  05/18/20  1835   PH  --   --   --   --   --  7.40 7.40 7.39 7.35  --  7.43   PCO2  --   --   --   --   --  27* 33* 38 39  --  33*   PO2  --   --   --   --   --  131* 124* 132* 153*  --  323*   HCO3  --   --   --   --   --  17* 21 23 21  --  22   O2PER 24.0 2L 2L 2L   < > 40 40  40 40  40 40  40   < > 100    < > = values in this interval not displayed.     Thyroid Studies     Recent Labs   Lab Test 06/26/19  0925 07/23/18  0645   TSH 3.94 6.91*   T4  --  1.05     Urine Studies     Recent Labs   Lab Test 08/31/20  1615 08/20/20  1630 08/18/20  1404 05/18/20  0135 05/12/20  2250  01/30/19  1255   URINEPH 5.5 5.5 5.5 5.0 5.0   < > 5.0   NITRITE Negative Negative Negative Negative Negative   < > Negative   LEUKEST Negative Small* Moderate* Negative Negative   < > Negative   WBCU 1 12* 28* 7* 3   < > 82*   UWBCLM  --   --   --   --   --   --  Present*    < > = values in this interval not displayed.     Medication levels    Recent Labs   Lab Test 09/14/20  0546  09/09/20  0535  05/27/20  0837   VANCOMYCIN  --   --   --   --  26.6*   PSCON  --   --  1.6  --   --    TACROL 10.7   < > 11.6   < >  --     < > = values in this interval not displayed.     Microbiology:    Last Culture results with specimen source  Culture Micro   Date Value Ref Range Status   09/09/2020 No growth after 5 days  Preliminary   09/09/2020 No growth after 5 days  Preliminary   09/09/2020 No growth after 5 days  Preliminary   09/09/2020 No growth after 5 days  Preliminary   09/08/2020   Preliminary    Culture received and in progress.  Positive AFB results are called as soon as detected.    Final report to follow in 7 to 8 weeks.     09/08/2020   Preliminary    Assayed at [facility] [address] [place] [address] [phone]   09/08/2020 Culture negative after 6 days  Preliminary    09/08/2020 Light growth  Normal respiratory simona    Final   09/08/2020   Preliminary    Culture received and in progress.  Positive AFB results are called as soon as detected.    Final report to follow in 7 to 8 weeks.     09/08/2020   Preliminary    Assayed at [facility] [address] [place] [address] [phone]   09/08/2020 Culture negative after 6 days  Preliminary   09/08/2020 No growth after 6 days  Preliminary   09/08/2020 Light growth  Normal respiratory simona    Final   09/07/2020 No growth  Final   09/07/2020 No growth  Final   09/02/2020 (A)  Final    Canceled, Test credited  >10 Squamous epithelial cells/low power field indicates oral contamination. Please   recollect.     09/02/2020 Called to  Sasha Wheat RN at 0442 9/2/20 hg    Final   08/31/2020 Canceled, Test credited  Final   08/31/2020 (A)  Final    >10 Squamous epithelial cells/low power field indicates oral contamination. Please   recollect.     08/31/2020   Final    Notification of test cancellation was given to  Sasha Wheat RN @ 0133 9/1/20 .     08/31/2020 No growth  Final   08/31/2020 No growth  Final   08/20/2020 No growth  Final   08/20/2020 No yeast isolated  Final   08/20/2020 Light growth  Normal simona    Final   08/19/2020 No growth  Final   08/18/2020   Final    <10,000 colonies/mL  urogenital simona  Susceptibility testing not routinely done     08/18/2020 No growth  Final   08/18/2020 No growth  Final   05/21/2020 No growth  Final   05/21/2020 No growth  Final   05/18/2020 (A)  Final    On day 5, isolated in broth only:  Cutibacterium (Propionibacterium) acnes     05/18/2020   Final    Susceptibility testing of Cutibacterium is not done from this source. Our antibiogram   indicates that Cutibacterium species are susceptible to penicillin and cefotaxime, and   most are susceptible to clindamycin.  Sugar Jimenez M.D., Medical Director     05/18/2020 Culture negative after 4 weeks  Final   05/18/2020 (A)   Final    On day 4, isolated in broth only:  Cutibacterium (Propionibacterium) acnes  Susceptibility testing of Cutibacterium is not done from this source. Our antibiogram   indicates that Cutibacterium species are susceptible to penicillin and cefotaxime, and   most are susceptible to clindamycin.  Sugar Jimenez M.D., Medical Director     05/12/2020 No growth  Final   05/12/2020 No growth  Final   03/27/2019   Final    <10,000 colonies/mL  urogenital simona  Susceptibility testing not routinely done     02/11/2019 No growth  Final   01/30/2019 No growth  Final    Specimen Description   Date Value Ref Range Status   09/09/2020 Blood  Final   09/09/2020 Blood  Final   09/09/2020 Blood Left Hand  Final   09/09/2020 Blood Right Hand  Final   09/09/2020 Feces  Final   09/08/2020 Bronchial FINE NEEDLE ASPIRATION  Final   09/08/2020 Bronchial FINE NEEDLE ASPIRATION  Final   09/08/2020 Bronchial FINE NEEDLE ASPIRATION  Final   09/08/2020 Bronchoalveolar Lavage FINE NEEDLEL ASPIRATE  Final   09/08/2020 Bronchoalveolar Lavage FINE NEEDLE ASPIRATE  Final   09/08/2020 Bronchial lavage RIGHT LUNG  Final   09/08/2020 Bronchial lavage RIGHT LUNG  Final   09/08/2020 Bronchial lavage Right Lung SPCEIMEN 2  Final   09/08/2020 Bronchial lavage Right Lung SPECIMEN 2  Final   09/08/2020 Bronchial lavage Right Lung SPECIMEN 2  Final   09/08/2020 Bronchial lavage Right Lung SPECIMEN 2  Final   09/07/2020 Blood Left Arm  Final   09/07/2020 Blood Left Arm  Final   09/04/2020 Blood  Final   09/02/2020 Sputum  Final   09/02/2020 Sputum Screen  Final   09/01/2020 Urine Random  Final   09/01/2020 Blood  Final   09/01/2020 Feces  Final   09/01/2020 Urine  Final   08/31/2020 Throat  Final   08/31/2020 Sputum  Final   08/31/2020 Sputum  Final   08/31/2020 Blood Left Arm  Final   08/31/2020 Right Arm  Final   08/20/2020 Midstream Urine  Final   08/20/2020 Midstream Urine  Final   08/20/2020 Sputum  Final   08/20/2020 Sputum  Final   08/19/2020  Midstream Urine  Final   08/18/2020 Midstream Urine  Final   08/18/2020 Blood Left Arm  Final   08/18/2020 Blood Left Arm  Final   05/21/2020 Blood Left Arm  Final   05/21/2020 Blood Right Arm  Final        Last check of C difficile  C Diff Toxin B PCR   Date Value Ref Range Status   09/09/2020 Negative NEG^Negative Final     Comment:     Negative: C. difficile target DNA sequences NOT detected, presumed negative   for C.difficile toxin B or the number of bacteria present may be below the   limit of detection for the test.  FDA approved assay performed using [employer] GeneXpert real-time PCR.  A negative result does not exclude actual disease due to C. difficile and may   be due to improper collection, handling and storage of the specimen or the   number of organisms in the specimen is below the detection limit of the assay.       Infection Studies to assess Diarrhea   Recent Labs   Lab Test 09/10/20  1415   EPSTX1 Not Detected   EPSTX2 Not Detected   EPCAMP Not Detected   EPSALM Not Detected   EPSHGL Not Detected   EPVIB Not Detected   EPROTA Not Detected   EPNORO Not Detected   EPYER Not Detected     Virology:    Respiratory virus testing    Recent Labs   Lab Test 08/31/20  1302 08/21/20  0520 08/19/20  1900  08/18/20  1235 05/29/20  1530   IFLUA Not Detected  --  Not Detected   < >  --   --    FLUAH1 Not Detected  --  Not Detected   < >  --   --    [id number] Not Detected  --  Not Detected   < >  --   --    FLUAH3 Not Detected  --  Not Detected   < >  --   --    IFLUB Not Detected  --  Not Detected   < >  --   --    PIV1 Not Detected  --  Not Detected  --   --   --    PIV2 Not Detected  --  Not Detected  --   --   --    PIV3 Not Detected  --  Not Detected  --   --   --    PIV4 Not Detected  --  Not Detected   < >  --   --    RSVA Not Detected  --  Not Detected   < >  --   --    RSVB Not Detected  --  Not Detected   < >  --   --    HMPV Not Detected  --  Not Detected  --   --   --    ADENOV Not Detected  --  Not  Detected   < >  --   --    CORONA Not Detected  --  Not Detected   < >  --   --    [id number] Nasopharyngeal Nasopharyngeal  --   --  Nasopharyngeal Nasopharyngeal   SARSCOVRES NEGATIVE NEGATIVE  --   --  NEGATIVE NEGATIVE    < > = values in this interval not displayed.     Log IU/mL of CMVQNT   Date Value Ref Range Status   09/13/2020 Not Calculated <2.1 [Log_IU]/mL Final   09/01/2020 Not Calculated <2.1 [Log_IU]/mL Final   08/19/2020 Not Calculated <2.1 [Log_IU]/mL Final   08/13/2020 Not Calculated <2.1 [Log_IU]/mL Final   06/15/2020 Not Calculated <2.1 [Log_IU]/mL Final     EBV DNA Copies/mL   Date Value Ref Range Status   09/01/2020 EBV DNA Not Detected EBVNEG^EBV DNA Not Detected [Copies]/mL Final   08/13/2020 EBV DNA Not Detected EBVNEG^EBV DNA Not Detected [Copies]/mL Final   06/15/2020 EBV DNA Not Detected EBVNEG^EBV DNA Not Detected [Copies]/mL Final     Imaging:    Recent Results (from the past 48 hour(s))   CT Chest w/o Contrast    Narrative    EXAMINATION: CT CHEST W/O CONTRAST, 9/13/2020 9:29 AM    CLINICAL HISTORY: Cough, persistent fever    COMPARISON: Chest x-ray 9/11/2020, chest CT 9/2/2020.    TECHNIQUE: CT imaging obtained through the chest without contrast.  Coronal and axial MIP reformatted images obtained.     CONTRAST:  none.    FINDINGS: Cardiac transplantation. Normal heart size, trace  pericardial effusion. Normal caliber thoracic aorta and pulmonary  trunk. Normal branching of the thoracic great vessels. Normal thyroid.  No suspicious lower cervical or axillary lymphadenopathy. Prominent  right hilar lymphadenopathy. Fluid-filled, distended esophagus with  small hiatal hernia.    Dependent secretions in the trachea. Small right pleural effusion with  associated compressive atelectasis. The spiculated right hilar mass  has enlarged and currently measures 3.6 x 5.4 cm. There is associated  compression and narrowing upon the right upper lobe subsegmental  bronchioles. There is interlobular  septal thickening of the right  upper and lower lobes with increased right basilar and right upper  lobe tree-in-bud nodular opacities. Scattered right basilar  groundglass opacities noted. Faint left lower lobe groundglass  opacities also seen. Multiple scattered pulmonary nodules measuring up  to 5 mm are also noted (for example series 6, image 89 on the right).     Evaluation of the upper abdomen is limited. Cholelithiasis without  cholecystitis. Mild diffuse hepatic hypoattenuation.    BONES: Sternotomy incision with persistent disunion. Wires are intact.  No acute or suspicious osseous abnormality.      Impression    IMPRESSION:  1. Increased size of the right hilar mass/consolidation with  associated narrowing of the subsegmental right upper lobe bronchioles  and multiple scattered pulmonary nodules as above. Differential  continues to include infection versus malignancy with postobstructive  pneumonia.  2. Increased bibasilar groundglass and right lung tree-in-bud nodular  opacities suggestive of infection, possibly secondary to aspiration.  Small right pleural effusion.  3. Surgical changes of cardiac transplantation.  4. Cholelithiasis without cholecystitis.    I have personally reviewed the examination and initial interpretation  and I agree with the findings.    ANA JOY MD   XR Feeding Tube Placement    Narrative    FEEDING TUBE PLACEMENT 9/14/2020 11:43 AM    INDICATION: Nutritional needs    COMPARISON: None.    FLUOROSCOPY TIME: 1.6 mins.    FINDINGS: The feeding tube was advanced under fluoroscopic guidance  with final position of tip in the fourth portion of the duodenum. A  small amount of barium was injected to demonstrate placement within  the small bowel. The tube was flushed with sterile water and secured  via nasal bridle. There were no complications of the procedure.      Impression    IMPRESSION: Uncomplicated feeding tube placement with tip in the  fourth portion of the duodenum.    I,  JHOANA KHAN MD, attest that I was present for all critical  portions of the procedure and was immediately available to provide  guidance and assistance during the remainder of the procedure.    I have personally reviewed the examination and initial interpretation  and I agree with the findings.    JHOANA KHAN MD         9/14 ABX:  NJ tube placed.  9/13 Chest CT:  1. Increased size of the right hilar mass/consolidation with associated narrowing of the subsegmental right upper lobe bronchioles and multiple scattered pulmonary nodules as above. Differential continues to include infection versus malignancy with postobstructive pneumonia.  2. Increased bibasilar groundglass and right lung tree-in-bud nodular opacities suggestive of infection, possibly secondary to aspiration.  Small right pleural effusion.  3. Surgical changes of cardiac transplantation.  4. Cholelithiasis without cholecystitis.  9/7 CXR:  No acute cardiopulmonary findings.  Less prominent right superior hilar opacity.  9/2/20 Chest CT:  . New mass vs consolidation in the right upper lobe and right hilum since 7/29/2020 with narrowing of multiple right upper lobe bronchi.  1a. Differential pneumonia with reactive right hilar lymphadenopathy vs. Malignancy (ex. Lymphoma / PTLD) with post obstructive pneumonia.  2. Stable additional pulmonary lesions as above.  3. Stable postsurgical changes of heart transplant.  4. Cholelithiasis without evidence of cholecystitis.  9/1 CXR:  Right superior hilar opacity, indeterminate. Possibly infection, adenopathy, or other. PA and lateral chest xray or CT.  8/31 Renal U/S:  1. Small echogenic foci in the upper pole of the right kidney and midpole the left kidney without posterior acoustic shadowing and no correlate on 6/2/2020 CT are unlikely to represent stones, potentially representing invagination of perinephric fat and extension of renal sinus fat, respectively, or other reflective interfaces of  doubtful clinical significance. No appreciable urinary tract stone. The large left renal pelvis stone previously seen on 6/2/2020 CT is not visualized.  2. No hydronephrosis.  8/31 Head CT:  No acute intracranial pathology.  8/31 CXR:  No acute airspace disease.

## 2020-09-14 NOTE — PROGRESS NOTES
"                          Diabetes Consult Daily  Progress Note          Assessment/Plan:     HPI:   Mariusz Sharp \"Red\" is a 59 year old male with history of CAD, LV thrombus, CKD Stage III, PAF, DM Type II, and ICM s/p HM III LVAD as BTT (2018) with subsequent OHT 5/18/20, presenting with progressive SOB, cough, and fever, found to have RUL mass. Patient is a heart transplant patient 5/18/2020.  Diabetes service consulted for persistent hyperglycemia, which seemed to be most related to insufficient/absent aspart for carbohydrates.     Continues to not feel well over interval - didn't sleep much last night. Still not eating much. Low grade temps persist, ID consulted 9/12.    Patient was going to have TF placed yesterday - postponed until today, going around 10:30 AM.  BG trending > target later in the day yesterday.  Nursing aware to page when patient returns with TF and when feeding starts.     5:21 PM  - received page from nursing advising patient will be NPO at mid-night.  NPH dosing held.  Will need to be re-ordered in the AM.        ASSESSMENT:     1)  Type II DM; dx 3 years ago.  Poorly controlled.  See A1c listed below.  Is anemic.        Plan:      -  NPH DOSE HELD FOR EVENING - patient will now be NPO at midnight for bronch in AM.   Will need NPH re-ordered 9/15     -  discontinued Glargine and change to NPH 16 BID ( will likely need further adjustments to accomodate for new TF when at goal - starting at 15 mls/hr and advancing by 10 mls q8 hr until at goal rate of 45 ml/hr)     - continue Novolog 1:8 gCHO breakfast   - continue Novolog 1 unit per 6.5 grams carbohydrate for lunch/supper/snacks   - continue Novolog high resistance TID AC, HS, 0500   - BG monitor q4h   - resume Dexcom when able to get another sensor   - hypoglycemia protocol   - will follow        Outpatient diabetes follow up: MIRNA Schafer PA-C  Plan discussed with RN and patient            Interval History:     The last 24 " "hours progress and nursing notes reviewed.  BG trending above target.  No acute events.  General  Malaise persists with low grade fever.  Confirmed with RN lenka will be going to have his G-tube placed today around 10:30, she will page when the patient returns from the procedure and feedings start.  Red denies any changes today, denies CP or SOB.  No abdominal pain or nausea.  Endorses fatigue as  His chief complaint.      Recent Labs   Lab 09/14/20  0827 09/14/20  0546 09/14/20  0258 09/13/20  2130 09/13/20  1706 09/13/20  1553 09/13/20  0717 09/13/20  0612  09/12/20  0545  09/11/20  0538  09/10/20  0546  09/09/20  0535   GLC  --  205*  --   --   --   --   --  167*  --  201*  --  130*  --  131*  --  163*   *  --  237* 322* 262* 264* 165*  --    < >  --    < >  --    < >  --    < >  --     < > = values in this interval not displayed.           Nutrition:     Orders Placed This Encounter      Consistent Carbohydrate Diet 5341-4823 Calories: Moderate Consistent CHO (4-6 CHO units/meal)    Boost GC  Nutren 1.5 @ 45 mL/hr to provide 1620 kcals (25 kcal/kg/day), 190 g CHO and no Fiber daily.  Start TF @ 15 ml/hr and advance by 10 ml q 8 hrs until goal rate.    Goal - 45cc/hr.          PTA Regimen:     4-5 x/day + BG monitoring frequency-- Dexcom G6  Lantus 32 units at HS  Novolog 1 units per 8 grams carb  Novolog High (1 per 25) resistance scale           Review of Systems:   See interval hx          Medications:          Physical Exam:   /84 (BP Location: Left arm)   Pulse 106   Temp 98.3  F (36.8  C) (Oral)   Resp 16   Ht 1.626 m (5' 4\")   Wt 78.6 kg (173 lb 3.2 oz)   SpO2 96%   BMI 29.73 kg/m       General: pleasant, no acute distress - appears fatigued  HEENT: NC/AT. MMM, sclera not injected  Lungs: unlabored, no cough, no SOB  ABD: rounded, soft  Skin: warm and dry, no obvious lesions  Feet: WNL, no open areas, CMS intact.  Toenails WNL.    MSK:  moving all extremities  Lymp:  no LE " edema  Mental Status: Alert and oriented x3  Psych:  Cooperative, continues to be withdrawn, minimal eye contact         Data:     Lab Results   Component Value Date    A1C 7.4 08/19/2020    A1C 7.2 08/13/2020    A1C 7.8 06/15/2020    A1C 8.0 05/12/2020    A1C 9.5 07/05/2019        CBC RESULTS:   Recent Labs   Lab Test 09/14/20  0546   WBC 8.0   RBC 2.64*   HGB 7.2*   HCT 22.9*   MCV 87   MCH 27.3   MCHC 31.4*   RDW 13.6        Recent Labs   Lab Test 09/14/20  0546 09/13/20  0612   * 127*   POTASSIUM 4.2 4.3   CHLORIDE 99 95   CO2 22 19*   ANIONGAP 8 13   * 167*   BUN 34* 28   CR 2.01* 1.90*   ARLENE 8.3* 8.6     Liver Function Studies -   Recent Labs   Lab Test 09/12/20  0545   PROTTOTAL 6.2*   ALBUMIN 1.9*   BILITOTAL 0.6   ALKPHOS 102   AST 85*   ALT 47     Lab Results   Component Value Date    INR 1.06 08/26/2020    INR 1.16 06/04/2020    INR 1.16 06/01/2020    INR 1.54 05/18/2020    INR 2.21 05/18/2020    INR 1.42 05/18/2020    INR 2.19 05/18/2020    INR 2.21 05/15/2020    INR 2.76 05/14/2020    INR 2.88 05/13/2020    INR 2.47 05/12/2020    INR 2.5 05/11/2020       CARMEN Pardo CNP   Inpatient Diabetes Management Service  Pager - [phone]  Diabetes Management Team job code: [id number]     I spent a total of 25  minutes bedside and on the inpatient unit managing glycemic care.  Over 50% of my time on the unit was spent counseling the patient and/or coordinating care regarding acute hyperglycemic management.  See note for details.

## 2020-09-14 NOTE — PROGRESS NOTES
CLINICAL NUTRITION SERVICES - REASSESSMENT NOTE     Nutrition Prescription    RECOMMENDATIONS FOR MDs/PROVIDERS TO ORDER:  1. If/when pt is consuming greater than 1100 kcals and 42 g protein daily on average, then discontinue TFs. TFs may be adjusted, pending oral intake. Notify Endo team before making any change to TFs.   2. Adjust free water flushes via feeding tube as needed, pending fluid status. Currently, free water flushes are minimal, or for patency.   3. Monitor lytes (Phos, Mg++, and K+) for refeeding syndrome. If lytes trend low, aggressively replace. Ensure the Rx Electrolyte Replacement Management Adult order set is implemented and select the option for high replacement.  4. Consider scheduling antiemetics/antinauseants ~20 minutes prior to meals to help optimize nausea control. Rule out delayed gastric emptying, if suspected.   5. Consider discontinuing calcium/vitamin D twice daily, if remains off prednisone.     Malnutrition Status:    Severe malnutrition in the context of chronic illness    Recommendations already ordered by Registered Dietitian (RD):  None at this time.     Future/Additional Recommendations:  1. Continue current diet, as ordered. Encourage intake of oral supplements. If diet order is limiting his oral intake, liberalize to a High Consistent Carbohydrate diet order.   2. Monitor BG control. Hgb A1c of 7.4 on 8/19/20. BG was 185 on 9/14. DM II hx. Endo is following.   3. Continue certavite, as ordered, to help meet micronutrient needs  4. Monitor iron status and potential need for iron supplementation, if warranted.   5. Monitor K+ trends.   6. Consider checking vitamin D status.      Received consult for TF assess and order.     EVALUATION OF THE PROGRESS TOWARD GOALS   Diet: Moderate Consistent Carbohydrate since 9/8. Room service appropriate with assist. Strawberry Boost Glucose Control at 10:00, 14:00, and HS. Gelatein 20 between meals.   Intake: Poor diet tolerance. Flowsheets  "(% intake) indicate pt consuming 100% on 9/8, 0-50% with a poor appetite 9/9, 0% with a poor appetite 9/10, 50% 9/11-9/12, and 0% on 9/13. Per team 9/14, \"He is tolerating minimal po and minimal activity due to nausea.\" Skipping meals, at times (per RN, skipped breakfast and lunch on 9/9). Called pt twice on the phone today. Pt did not answer phone during attempts.     Kcal counts:   9/12   # Meals Recorded: 1 meal ordered from kitchen, no intake recorded. # Supplements Recorded: no intake recorded. Per RN on 9/12, \"Pt skipped his first two meals this day. The Pt's third meal was ordered for him and he had very small amounts of a part of the foods in the meal.\"   9/13   125 kcals and 7 g protein. # Meals Recorded: 2 meals ordered from kitchen. No intake recorded from these meals. # Supplements Recorded: 50% 1 Boost Glucose Control. Calorie count sheet shows \"1/2 Boost Breeze.\" Pt does not have supplement order for Boost Breeze, and did not receive any of this supplement from kitchen 9/13 - pt does have order for Boost Glucose Control, so calorie/protein calculations based on that.    9/14-9/15 Pending   * Not meeting estimated needs of 2204-6757 kcals/day (25 - 30 kcals/kg) and 64-77 grams protein/day (1 - 1.2 grams of pro/kg).    Nutrition Support: TF orders are in place, Nutren 1.5 with goal rate of 45 mL/hr to provide 1620 kcals (25 kcal/kg/day), 73 g PRO (1.1 g/kg/day), 821 mL H2O, 190 g CHO and no Fiber daily. NDT with bridle placed in radiology this morning (9/14/2020).     NEW FINDINGS   General: Per team 9/14, persistent SOB and notes productive cough. Pt is fatigued, per chart.   GI: Per team 9/14, denies vomiting. He notes stable loose stools with persistent nausea this AM. Pt having zero to seven stools daily. Note, no stools documented 9/11, 9/12, and 9/14 but had one stool on 9/13 (stooling appears to be less frequent). Stools are loose and brown. Per chart, emesis on 9/11 and intermittent nausea per RN " on 9/14.   Wt Hx: 94.2 kg (9/4/19), 88.9 kg (4/29/20), 83.7 kg (6/15/20), 80.5 kg (8/21/20), 82 kg (8/31, admit), 78.6 kg (9/13/20) - Pt has lost 11.6% of his body wt over the last four to five months.     MALNUTRITION  % Intake: </= 50% for >/= 5 days (severe)  % Weight Loss: > 10% in less than 6 months (severe)  Subcutaneous Fat Loss: Unable to determine due to unable to complete NFPA to limit exposure during COVID-19 pandemic   Muscle Loss: Unable to determine due to unable to complete NFPA to limit exposure during COVID-19 pandemic   Fluid Accumulation/Edema: Trace, per chart.   Malnutrition Diagnosis: Severe malnutrition in the context of chronic illness    Previous Goals   Patient to consume % of nutritionally adequate meal trays TID, or the equivalent with supplements/snacks.  Evaluation: Not meeting.     Previous Nutrition Diagnosis  Inadequate oral intake related to decreased/poor appetite as evidenced by pt report and eating 0-75% meals per RN flowsheets since 9/6.   Evaluation: Unresolved. Updated below.     CURRENT NUTRITION DIAGNOSIS  Inadequate oral intake related to decreased/poor appetite as evidenced by pt report and eating 0-50% meals per RN flowsheets since 9/9, and skipping meals.     INTERVENTIONS  Implementation  Collaboration with other providers: Notified Endo team that feeding tube was placed in radiology.     Goals  Total average nutrition intake to meet 1344-1496 kcals/day (25 - 30 kcals/kg) and 64-77 grams protein/day (1 - 1.2 grams of pro/kg).    Monitoring/Evaluation  Progress toward goals will be monitored and evaluated per protocol.     Nutrition will continue to follow.     Dorcas Meek, MS, RD, LD, [facility]   6C Pgr: [phone]

## 2020-09-14 NOTE — PROGRESS NOTES
[facility]   Cardiology II Service / Advanced Heart Failure  Daily Progress Note  Date of Service: 9/14/2020      Patient: Mariusz Sharp  MRN: [id number]  Admission Date: 8/31/2020  Hospital Day # 14       Assessment and Plan: Mariusz Sharp is a 59 year old male with PMH of CAD, LV thrombus, CKD Stage III, PAF, DM Type II, and ICM s/p HM III LVAD as BTT with subsequent OHT 5/18/20. He presents with progressive SOB, cough, and fever.      Plan today:  - Feeding tube per Xray.   - Zosyn initiated, Cedinir discontinued.   - NS nebs.   - Case reviewed with Pulmonary and will discuss diagnostic approach with ID.  - Posaconaole on hold, level pending.      Fever with Cough secondary to RUL mass. Tmax 102 prior to admission. History of rhinovirus. CDIF, LA, UA, COVID, Legionella, Cryptococcus, Strep pneumo negative, Aspergillus, RSV, CMV, and EBV negative. Renal US notes resolution of prior stone. History of PAcnes to outflow graft and Donor positive S Anginosus and Veillonella (completed course of antibiotics). Concern for Chronic Prostatitis. Vanco/Zosyn discontinued 9/1. CT Chest positive for RUL mass concerning for PTLD vs infection. Beta D glucan 90 and trending upward. Appreciate IR Pulm consult. S/p EBUS with BAL 9/8 with negative cytology and PJP. LDH-380. Absolute CD4-359.  - Tissue for histopathology, AFB, bacterial, and fungal NTD.   - Appreciate Transplant ID Consult.   - Blood cultures NTD, repeated 9/9.  - Posaconazole on hold, repeat level pending.   - IGM, IGG, IGE, IGA, CMV, and urine studies pending.   - Atovaquone initiated 750 mg po BID.   - NonContrast Chest CT 9/13 consistent with increased RUL mass with progressive post obstructive PNA.   - Cefdinir transitioned to IV Zosyn.   - NS nebs.      Diarrhea, Nausea, and Vomiting. CDIF negative and CMV negative. Repeat stool cultures negative.    - Compazine prn nausea.   - Posaconazole level pending.      S/p OHT secondary to ICM. 5/18/20.  Echo 8/31 with EF 70% and normal graft function.   RHC 8/26 with mRA-3, RV-20/2, mPA-15, mPCWP-10, AMRIT CO-4.91, AMRIT CI-2.63, biopsy negative. Immuknow 33 9/3 with reduction in Cellcept above.   Immunosuppression: Simulect 5/18 and 5/22. Tac 0.5 mg po BID, Tac level 9.6 with level pending today Goal-8-10. Cellcept 500 mg po BID, and Prednisone completed 8/27/20.   Serostatus: CMV: D+, R-, EBV: D- R+, Toxo D- R-  Prophylaxis: Valcyte. Atovaquone per ID as above.   - Continue ASA and Crestor.   - Next EMB due 9/30, recheck Immuknow at this time as well.      HTN. Prior to admission Norvasc on  Hold.   - Hydralazine 25 mg po TID.      DM Type II.   - Lantus 30 units at HS.   - Novolog preprandial with medium intensity sliding scale insulin.      Elevated PSA with Concern for Chronic Prostatitis. PSA-8.21. MRI consistent with BPH.   - Cefdinir discontinued, Zosyn initiated for post obstructive CAP.      Protein Calore Malnutrition. Decreased po intake with need for fluid resuscitation. Weight down 6 lbs from admission. Albumin 1.9.   - Appreciate Nutrition consult.    - Prealbumin 4.  - Feeding tube per X-ray.       FEN: Moderate Carb diet   PROPHY: Heparin SQ  LINES: PIV  DISPO: TBD. Therapy recommending TCU at this time  CODE STATUS: Full Code   ================================================================    Interval History/ROS: He complains of persistent SOB and notes productive cough. He notes improvement in hot flashes and chills. He denies chest pain, palpitations, vomiting, and LE edema. He notes stable loose stools with persistent nausea this AM. He is tolerating minimal po and minimal activity due to nausea.     Last 24 hr care team notes reviewed.   ROS:  4 point ROS including Respiratory, CV, GI and , other than that noted in the HPI, is negative.     Medications: Reviewed in EPIC.     Physical Exam:   /84 (BP Location: Left arm)   Pulse 106   Temp 98.3  F (36.8  C) (Oral)   Resp 16   Ht  "1.626 m (5' 4\")   Wt 78.6 kg (173 lb 3.2 oz)   SpO2 96%   BMI 29.73 kg/m    GENERAL: Appears alert and oriented times three.   HEENT: Eye symmetrical and free of discharge bilaterally. Mucous membranes moist and without lesions.  NECK: Supple and without lymphadenopathy. JVD below clavicular line upright.    CV: RRR, S1S2 present without murmur, rub, or gallop.   RESPIRATORY: Respirations regular, even, and unlabored. Lungs CTA throughout.   GI: Soft and non distended with normoactive bowel sounds present in all quadrants. No tenderness, rebound, guarding. No organomegaly.   EXTREMITIES: Trace bilateral LE peripheral edema. 2+ bilateral pedal pulses.   NEUROLOGIC: Alert and orientated x 3. CN II-XII grossly intact. No focal deficits.   MUSCULOSKELETAL: No joint swelling or tenderness.   SKIN: No jaundice. No rashes or lesions.     Data:  CMP  Recent Labs   Lab 09/14/20  0546 09/13/20  0612 09/12/20  0545 09/11/20  0538   * 127* 128* 131*   POTASSIUM 4.2 4.3 4.1 4.2   CHLORIDE 99 95 98 100   CO2 22 19* 22 22   ANIONGAP 8 13 8 9   * 167* 201* 130*   BUN 34* 28 31* 28   CR 2.01* 1.90* 1.82* 1.72*   GFRESTIMATED 36* 38* 40* 43*   GFRESTBLACK 41* 44* 46* 50*   ARLENE 8.3* 8.6 8.9 8.7   MAG 1.7 1.6 2.2 1.8   PHOS 3.6  --   --   --    PROTTOTAL  --   --  6.2*  --    ALBUMIN  --   --  1.9*  --    BILITOTAL  --   --  0.6  --    ALKPHOS  --   --  102  --    AST  --   --  85*  --    ALT  --   --  47  --      CBC  Recent Labs   Lab 09/14/20  0546 09/13/20  0612 09/12/20  0545 09/11/20  0538   WBC 8.0 9.0 7.9 6.9   RBC 2.64* 2.70* 2.91* 2.77*   HGB 7.2* 7.2* 7.8* 7.5*   HCT 22.9* 23.3* 25.0* 24.0*   MCV 87 86 86 87   MCH 27.3 26.7 26.8 27.1   MCHC 31.4* 30.9* 31.2* 31.3*   RDW 13.6 13.6 13.6 13.6    [phone]     Patient discussed with Dr. Nielson.      Antonia Byrne [facility]  9/14/2020    "

## 2020-09-15 ENCOUNTER — APPOINTMENT (OUTPATIENT)
Dept: PHYSICAL THERAPY | Facility: CLINIC | Age: 60
End: 2020-09-15
Payer: COMMERCIAL

## 2020-09-15 ENCOUNTER — APPOINTMENT (OUTPATIENT)
Dept: ULTRASOUND IMAGING | Facility: CLINIC | Age: 60
End: 2020-09-15
Attending: NURSE PRACTITIONER
Payer: COMMERCIAL

## 2020-09-15 LAB
ABO + RH BLD: NORMAL
ABO + RH BLD: NORMAL
ANION GAP SERPL CALCULATED.3IONS-SCNC: 10 MMOL/L (ref 3–14)
ANION GAP SERPL CALCULATED.3IONS-SCNC: 7 MMOL/L (ref 3–14)
BACTERIA SPEC CULT: NO GROWTH
BLD GP AB SCN SERPL QL: NORMAL
BLD PROD TYP BPU: NORMAL
BLD PROD TYP BPU: NORMAL
BLD UNIT ID BPU: 0
BLOOD BANK CMNT PATIENT-IMP: NORMAL
BLOOD PRODUCT CODE: NORMAL
BPU ID: NORMAL
BUN SERPL-MCNC: 40 MG/DL (ref 7–30)
BUN SERPL-MCNC: 40 MG/DL (ref 7–30)
CALCIUM SERPL-MCNC: 8.1 MG/DL (ref 8.5–10.1)
CALCIUM SERPL-MCNC: 8.3 MG/DL (ref 8.5–10.1)
CHLORIDE SERPL-SCNC: 100 MMOL/L (ref 94–109)
CHLORIDE SERPL-SCNC: 102 MMOL/L (ref 94–109)
CO2 SERPL-SCNC: 21 MMOL/L (ref 20–32)
CO2 SERPL-SCNC: 23 MMOL/L (ref 20–32)
CREAT SERPL-MCNC: 2.73 MG/DL (ref 0.66–1.25)
CREAT SERPL-MCNC: 3.12 MG/DL (ref 0.66–1.25)
ERYTHROCYTE [DISTWIDTH] IN BLOOD BY AUTOMATED COUNT: 13.6 % (ref 10–15)
GFR SERPL CREATININE-BSD FRML MDRD: 21 ML/MIN/{1.73_M2}
GFR SERPL CREATININE-BSD FRML MDRD: 25 ML/MIN/{1.73_M2}
GLUCOSE BLDC GLUCOMTR-MCNC: 225 MG/DL (ref 70–99)
GLUCOSE BLDC GLUCOMTR-MCNC: 226 MG/DL (ref 70–99)
GLUCOSE BLDC GLUCOMTR-MCNC: 230 MG/DL (ref 70–99)
GLUCOSE BLDC GLUCOMTR-MCNC: 248 MG/DL (ref 70–99)
GLUCOSE BLDC GLUCOMTR-MCNC: 254 MG/DL (ref 70–99)
GLUCOSE BLDC GLUCOMTR-MCNC: 254 MG/DL (ref 70–99)
GLUCOSE SERPL-MCNC: 240 MG/DL (ref 70–99)
GLUCOSE SERPL-MCNC: 261 MG/DL (ref 70–99)
HCT VFR BLD AUTO: 21.9 % (ref 40–53)
HGB BLD-MCNC: 6.7 G/DL (ref 13.3–17.7)
HGB BLD-MCNC: 6.9 G/DL (ref 13.3–17.7)
HGB BLD-MCNC: 8 G/DL (ref 13.3–17.7)
MAGNESIUM SERPL-MCNC: 2.2 MG/DL (ref 1.6–2.3)
MCH RBC QN AUTO: 27 PG (ref 26.5–33)
MCHC RBC AUTO-ENTMCNC: 30.6 G/DL (ref 31.5–36.5)
MCV RBC AUTO: 88 FL (ref 78–100)
NUM BPU REQUESTED: 1
PHOSPHATE SERPL-MCNC: 4.3 MG/DL (ref 2.5–4.5)
PLATELET # BLD AUTO: 413 10E9/L (ref 150–450)
POSACONAZOLE SERPL-MCNC: 2.2 UG/ML (ref 0.7–5)
POTASSIUM SERPL-SCNC: 4 MMOL/L (ref 3.4–5.3)
POTASSIUM SERPL-SCNC: 4.5 MMOL/L (ref 3.4–5.3)
RBC # BLD AUTO: 2.48 10E12/L (ref 4.4–5.9)
SODIUM SERPL-SCNC: 131 MMOL/L (ref 133–144)
SODIUM SERPL-SCNC: 132 MMOL/L (ref 133–144)
SPECIMEN EXP DATE BLD: NORMAL
SPECIMEN SOURCE: NORMAL
TACROLIMUS BLD-MCNC: 8.4 UG/L (ref 5–15)
TME LAST DOSE: NORMAL H
TRANSFUSION STATUS PATIENT QL: NORMAL
TRANSFUSION STATUS PATIENT QL: NORMAL
WBC # BLD AUTO: 4.7 10E9/L (ref 4–11)

## 2020-09-15 PROCEDURE — 00000146 ZZHCL STATISTIC GLUCOSE BY METER IP

## 2020-09-15 PROCEDURE — 84100 ASSAY OF PHOSPHORUS: CPT | Performed by: NURSE PRACTITIONER

## 2020-09-15 PROCEDURE — 87206 SMEAR FLUORESCENT/ACID STAI: CPT | Performed by: INTERNAL MEDICINE

## 2020-09-15 PROCEDURE — 85027 COMPLETE CBC AUTOMATED: CPT | Performed by: NURSE PRACTITIONER

## 2020-09-15 PROCEDURE — 25000132 ZZH RX MED GY IP 250 OP 250 PS 637: Performed by: INTERNAL MEDICINE

## 2020-09-15 PROCEDURE — 97530 THERAPEUTIC ACTIVITIES: CPT | Mod: GP

## 2020-09-15 PROCEDURE — 36415 COLL VENOUS BLD VENIPUNCTURE: CPT | Performed by: NURSE PRACTITIONER

## 2020-09-15 PROCEDURE — 87116 MYCOBACTERIA CULTURE: CPT | Performed by: INTERNAL MEDICINE

## 2020-09-15 PROCEDURE — 25000128 H RX IP 250 OP 636: Performed by: INTERNAL MEDICINE

## 2020-09-15 PROCEDURE — 99153 MOD SED SAME PHYS/QHP EA: CPT | Performed by: INTERNAL MEDICINE

## 2020-09-15 PROCEDURE — 86850 RBC ANTIBODY SCREEN: CPT

## 2020-09-15 PROCEDURE — 25000132 ZZH RX MED GY IP 250 OP 250 PS 637: Performed by: NURSE PRACTITIONER

## 2020-09-15 PROCEDURE — 31624 DX BRONCHOSCOPE/LAVAGE: CPT | Performed by: INTERNAL MEDICINE

## 2020-09-15 PROCEDURE — 87015 SPECIMEN INFECT AGNT CONCNTJ: CPT | Performed by: INTERNAL MEDICINE

## 2020-09-15 PROCEDURE — 99233 SBSQ HOSP IP/OBS HIGH 50: CPT | Performed by: NURSE PRACTITIONER

## 2020-09-15 PROCEDURE — 21400000 ZZH R&B CCU UMMC

## 2020-09-15 PROCEDURE — 25000128 H RX IP 250 OP 636: Performed by: NURSE PRACTITIONER

## 2020-09-15 PROCEDURE — 25000132 ZZH RX MED GY IP 250 OP 250 PS 637: Performed by: STUDENT IN AN ORGANIZED HEALTH CARE EDUCATION/TRAINING PROGRAM

## 2020-09-15 PROCEDURE — 80197 ASSAY OF TACROLIMUS: CPT | Performed by: NURSE PRACTITIONER

## 2020-09-15 PROCEDURE — 25000131 ZZH RX MED GY IP 250 OP 636 PS 637: Performed by: PHYSICIAN ASSISTANT

## 2020-09-15 PROCEDURE — 31623 DX BRONCHOSCOPE/BRUSH: CPT | Performed by: INTERNAL MEDICINE

## 2020-09-15 PROCEDURE — 85018 HEMOGLOBIN: CPT

## 2020-09-15 PROCEDURE — 80048 BASIC METABOLIC PNL TOTAL CA: CPT | Performed by: NURSE PRACTITIONER

## 2020-09-15 PROCEDURE — 83735 ASSAY OF MAGNESIUM: CPT | Performed by: NURSE PRACTITIONER

## 2020-09-15 PROCEDURE — 88312 SPECIAL STAINS GROUP 1: CPT | Performed by: INTERNAL MEDICINE

## 2020-09-15 PROCEDURE — 88108 CYTOPATH CONCENTRATE TECH: CPT | Performed by: INTERNAL MEDICINE

## 2020-09-15 PROCEDURE — 99152 MOD SED SAME PHYS/QHP 5/>YRS: CPT | Performed by: INTERNAL MEDICINE

## 2020-09-15 PROCEDURE — 85018 HEMOGLOBIN: CPT | Performed by: NURSE PRACTITIONER

## 2020-09-15 PROCEDURE — 25000131 ZZH RX MED GY IP 250 OP 636 PS 637: Performed by: NURSE PRACTITIONER

## 2020-09-15 PROCEDURE — 87102 FUNGUS ISOLATION CULTURE: CPT | Performed by: INTERNAL MEDICINE

## 2020-09-15 PROCEDURE — 76770 US EXAM ABDO BACK WALL COMP: CPT

## 2020-09-15 PROCEDURE — 0BDC8ZX EXTRACTION OF RIGHT UPPER LUNG LOBE, VIA NATURAL OR ARTIFICIAL OPENING ENDOSCOPIC, DIAGNOSTIC: ICD-10-PCS | Performed by: INTERNAL MEDICINE

## 2020-09-15 PROCEDURE — 87070 CULTURE OTHR SPECIMN AEROBIC: CPT | Performed by: INTERNAL MEDICINE

## 2020-09-15 PROCEDURE — 86901 BLOOD TYPING SEROLOGIC RH(D): CPT

## 2020-09-15 PROCEDURE — P9016 RBC LEUKOCYTES REDUCED: HCPCS

## 2020-09-15 PROCEDURE — 86900 BLOOD TYPING SEROLOGIC ABO: CPT

## 2020-09-15 PROCEDURE — 25000125 ZZHC RX 250: Performed by: INTERNAL MEDICINE

## 2020-09-15 PROCEDURE — 36415 COLL VENOUS BLD VENIPUNCTURE: CPT

## 2020-09-15 PROCEDURE — 86923 COMPATIBILITY TEST ELECTRIC: CPT

## 2020-09-15 RX ORDER — VALGANCICLOVIR 450 MG/1
450 TABLET, FILM COATED ORAL EVERY OTHER DAY
Status: DISCONTINUED | OUTPATIENT
Start: 2020-09-17 | End: 2020-09-21

## 2020-09-15 RX ORDER — LIDOCAINE HYDROCHLORIDE 20 MG/ML
SOLUTION OROPHARYNGEAL PRN
Status: DISCONTINUED | OUTPATIENT
Start: 2020-09-15 | End: 2020-09-15 | Stop reason: HOSPADM

## 2020-09-15 RX ORDER — LIDOCAINE HYDROCHLORIDE 40 MG/ML
INJECTION, SOLUTION RETROBULBAR PRN
Status: DISCONTINUED | OUTPATIENT
Start: 2020-09-15 | End: 2020-09-15 | Stop reason: HOSPADM

## 2020-09-15 RX ORDER — FENTANYL CITRATE 50 UG/ML
INJECTION, SOLUTION INTRAMUSCULAR; INTRAVENOUS PRN
Status: DISCONTINUED | OUTPATIENT
Start: 2020-09-15 | End: 2020-09-15 | Stop reason: HOSPADM

## 2020-09-15 RX ORDER — MAGNESIUM HYDROXIDE 1200 MG/15ML
LIQUID ORAL PRN
Status: DISCONTINUED | OUTPATIENT
Start: 2020-09-15 | End: 2020-09-15 | Stop reason: HOSPADM

## 2020-09-15 RX ADMIN — INSULIN HUMAN 20 UNITS: 100 INJECTION, SUSPENSION SUBCUTANEOUS at 21:49

## 2020-09-15 RX ADMIN — Medication 1 TABLET: at 11:16

## 2020-09-15 RX ADMIN — ATOVAQUONE 750 MG: 750 SUSPENSION ORAL at 11:16

## 2020-09-15 RX ADMIN — PIPERACILLIN AND TAZOBACTAM 3.38 G: 3; .375 INJECTION, POWDER, FOR SOLUTION INTRAVENOUS at 14:13

## 2020-09-15 RX ADMIN — MYCOPHENOLATE MOFETIL 500 MG: 500 TABLET ORAL at 11:17

## 2020-09-15 RX ADMIN — Medication 25 MG: at 17:25

## 2020-09-15 RX ADMIN — TACROLIMUS 0.5 MG: 0.5 CAPSULE ORAL at 11:17

## 2020-09-15 RX ADMIN — Medication 10 MEQ: at 07:51

## 2020-09-15 RX ADMIN — MYCOPHENOLATE MOFETIL 500 MG: 500 TABLET ORAL at 19:36

## 2020-09-15 RX ADMIN — Medication 25 MG: at 23:24

## 2020-09-15 RX ADMIN — MULTIVITAMIN 15 ML: LIQUID ORAL at 11:17

## 2020-09-15 RX ADMIN — Medication 10 MEQ: at 06:27

## 2020-09-15 RX ADMIN — ASPIRIN 81 MG CHEWABLE TABLET 81 MG: 81 TABLET CHEWABLE at 11:17

## 2020-09-15 RX ADMIN — Medication 1 TABLET: at 18:03

## 2020-09-15 RX ADMIN — VALGANCICLOVIR 450 MG: 450 TABLET, FILM COATED ORAL at 11:16

## 2020-09-15 RX ADMIN — Medication 25 MG: at 11:17

## 2020-09-15 RX ADMIN — LIDOCAINE 1 PATCH: 560 PATCH PERCUTANEOUS; TOPICAL; TRANSDERMAL at 01:19

## 2020-09-15 RX ADMIN — ROSUVASTATIN CALCIUM 10 MG: 10 TABLET, FILM COATED ORAL at 21:46

## 2020-09-15 RX ADMIN — PIPERACILLIN AND TAZOBACTAM 3.38 G: 3; .375 INJECTION, POWDER, FOR SOLUTION INTRAVENOUS at 04:45

## 2020-09-15 RX ADMIN — PIPERACILLIN AND TAZOBACTAM 3.38 G: 3; .375 INJECTION, POWDER, FOR SOLUTION INTRAVENOUS at 19:37

## 2020-09-15 RX ADMIN — TACROLIMUS 0.5 MG: 0.5 CAPSULE ORAL at 18:03

## 2020-09-15 RX ADMIN — INSULIN ASPART 8 UNITS: 100 INJECTION, SOLUTION INTRAVENOUS; SUBCUTANEOUS at 19:39

## 2020-09-15 ASSESSMENT — ACTIVITIES OF DAILY LIVING (ADL)
ADLS_ACUITY_SCORE: 16

## 2020-09-15 ASSESSMENT — MIFFLIN-ST. JEOR: SCORE: 1532.52

## 2020-09-15 NOTE — PROGRESS NOTES
CLINICAL NUTRITION SERVICES     INTERVENTIONS:  Implementation:  Ordered to extend kcal counts through 9/18 pm.    Follow up/Monitoring:  Will continue to follow pt.    Dorcas Meek, MS, RD, LD, [facility]   6C Pgr: [phone]

## 2020-09-15 NOTE — PROGRESS NOTES
[facility]   Cardiology II Service / Advanced Heart Failure  Daily Progress Note  Date of Service: 9/15/2020      Patient: Mariusz Sharp  MRN: [id number]  Admission Date: 8/31/2020  Hospital Day # 15    Assessment and Plan: Mariusz Sharp is a 59 year old male with PMH of CAD, LV thrombus, CKD Stage III, PAF, DM Type II, and ICM s/p HM III LVAD as BTT with subsequent OHT 5/18/20. He presents with progressive SOB, cough, and fever.      Plan today:  - Discontinue Posaconazole.   - Discontinue Atovaquone.   - Discuss further tissue culture with surgery.   - Plan to increase Tac dose in AM given cessation of Posaconazole.   - 1 unit PRBC today.   - Free water flushes increased.   - Repeat BMP at 1800. If no improvement in Cr in AM consult Nephrology.    - ASA discontinued.      Fever with Cough secondary to RUL mass complicated by Postobstructive Pneumoania. Tmax 102 prior to admission. History of rhinovirus. CDIF, LA, UA, COVID, Legionella, Cryptococcus, Strep pneumo negative, Aspergillus, RSV, CMV, and EBV negative. Renal US notes resolution of prior stone. History of PAcnes to outflow graft and Donor positive S Anginosus and Veillonella (completed course of antibiotics). Concern for Chronic Prostatitis. Vanco/Zosyn discontinued 9/1. CT Chest positive for RUL mass concerning for PTLD vs infection. Beta D glucan 90 and trending upward. Appreciate IR Pulm consult. S/p EBUS with BAL 9/8 with negative cytology and PJP. LDH-380. Absolute CD4-359. NonContrast Chest CT 9/13 consistent with increased RUL mass with progressive post obstructive PNA.   - Tissue for histopathology, AFB, bacterial, and fungal NTD from 9/8 EBUS.   - Repeat bronch with noted expansion of mass and difficulty obtained tissue biopsy, brushings obtained with cultures pending. Review with surgery to obtain biopsy.    - Appreciate Transplant ID Consult.   - Blood cultures NTD, repeated 9/9.  - Posaconazole discontinued.   - IGM-29,  IGG-510, IGA-108, CMV negative, annd UA negative. IGE, viral urine culture, and UC pending.   - Atovaquone discontinued.    - Cefdinir transitioned to IV Zosyn.   - NS and Albuterol nebs for clearance.      Diarrhea, Nausea, and Vomiting. CDIF negative and CMV negative. Repeat stool cultures negative.    - Compazine prn nausea.      S/p OHT secondary to ICM. 5/18/20. Echo 8/31 with EF 70% and normal graft function.   RHC 8/26 with mRA-3, RV-20/2, mPA-15, mPCWP-10, AMRIT CO-4.91, AMRIT CI-2.63, biopsy negative. Immuknow 33 9/3 with reduction in Cellcept above.   Immunosuppression: Simulect 5/18 and 5/22. Tac 0.5 mg po BID, Tac level 8.7 Goal-8-10. Cellcept 500 mg po BID, and Prednisone completed 8/27/20.   Serostatus: CMV: D+, R-, EBV: D- R+, Toxo D- R-  Prophylaxis: Valcyte. Atovaquone per ID as above.   - Continue Crestor.   - ASA discontinued in setting of hematuria.   - Next EMB due 9/30, recheck Immuknow at this time as well.      HTN. Prior to admission Norvasc on  Hold.   - Hydralazine 25 mg po TID.     ANDREW on CKD Stage III. Cr prior to admission 1.3-1.7.   - Monitor Tac daily.   - Encourage po. PRBC unit today as below.   - Free water flushes increased.   - Repeat BMP   - UA consistent with 30 protein, 53 RBC, and moderate blood.   - If no improvement in Cr in AM consult Nephrology.       DM Type II.   - Management per Endo appreciated.      Elevated PSA with Concern for Chronic Prostatitis. PSA-8.21. MRI consistent with BPH.   - Cefdinir discontinued, Zosyn initiated for post obstructive CAP as above.   - Hematuria per UA.      Protein Calore Malnutrition. Decreased po intake with need for fluid resuscitation. Weight down 6 lbs from admission. Albumin 1.9 Prealbumin 4.  - Appreciate Nutrition consult.    - Continue NJ tube feeds until tolerating adequate po.     Anemia. Baseline Hgb 7-8.   - Repeat Iron levels in AM.   - 1 unit PRBC today for Hgb 6.7.   - Heparin discontinued.   - UA with moderate hematuria,  "ASA discontinued.      FEN: Moderate Carb diet   PROPHY: Heparin subcutaneous discontinued with drop in Hgb.   LINES: PIV  DISPO: TBD. Therapy recommending TCU at this time  CODE STATUS: Full Code   ================================================================  Interval History/ROS: He states he is feeling slightly better today, but hates his feeding tube. He notes improvement in fever and chills. He notes persistent productive cough and shortness of breath. He denies palpitations. He notes improvement to nausea and denies vomiting. He notes stable 1-2 loose stools daily. He also notes mild LE edema. He is tolerating minimal po and minimal activity at this time.     Last 24 hr care team notes reviewed.   ROS:  4 point ROS including Respiratory, CV, GI and , other than that noted in the HPI, is negative.     Medications: Reviewed in EPIC.     Physical Exam:   /85 (BP Location: Left arm)   Pulse 93   Temp 97.4  F (36.3  C) (Axillary)   Resp 20   Ht 1.626 m (5' 4\")   Wt 79.7 kg (175 lb 9.6 oz)   SpO2 94%   BMI 30.14 kg/m    GENERAL: Appears alert and oriented times three.   HEENT: Eye symmetrical and free of discharge bilaterally. Mucous membranes moist and without lesions.  NECK: Supple and without lymphadenopathy. JVD below clavicular line upright.   CV: RRR, S1S2 present without murmur, rub, or gallop.   RESPIRATORY: Respirations regular, even, and unlabored. Lungs CTA throughout.   GI: Soft and non distended with normoactive bowel sounds present in all quadrants. No tenderness, rebound, guarding. No organomegaly.   EXTREMITIES: +1 bilateral LE peripheral edema. 2+ bilateral pedal pulses.   NEUROLOGIC: Alert and orientated x 3. CN II-XII grossly intact. No focal deficits.   MUSCULOSKELETAL: No joint swelling or tenderness.   SKIN: No jaundice. No rashes or lesions.     Data:  CMP  Recent Labs   Lab 09/15/20  0522 09/14/20  0546 09/13/20  0612 09/12/20  0545   * 129* 127* 128*   POTASSIUM 4.0 " 4.2 4.3 4.1   CHLORIDE 100 99 95 98   CO2 21 22 19* 22   ANIONGAP 10 8 13 8   * 205* 167* 201*   BUN 40* 34* 28 31*   CR 2.73* 2.01* 1.90* 1.82*   GFRESTIMATED 25* 36* 38* 40*   GFRESTBLACK 28* 41* 44* 46*   ARLENE 8.3* 8.3* 8.6 8.9   MAG 2.2 1.7 1.6 2.2   PHOS 4.3 3.6  --   --    PROTTOTAL  --   --   --  6.2*   ALBUMIN  --   --   --  1.9*   BILITOTAL  --   --   --  0.6   ALKPHOS  --   --   --  102   AST  --   --   --  85*   ALT  --   --   --  47     CBC  Recent Labs   Lab 09/15/20  0642 09/15/20  0522 09/14/20  0546 09/13/20  0612 09/12/20  0545   WBC  --  4.7 8.0 9.0 7.9   RBC  --  2.48* 2.64* 2.70* 2.91*   HGB 6.9* 6.7* 7.2* 7.2* 7.8*   HCT  --  21.9* 22.9* 23.3* 25.0*   MCV  --  88 87 86 86   MCH  --  27.0 27.3 26.7 26.8   MCHC  --  30.6* 31.4* 30.9* 31.2*   RDW  --  13.6 13.6 13.6 13.6   PLT  --  413 [phone]     Patient discussed with Dr. Nielson.      Antonia Byrne [facility]  9/15/2020

## 2020-09-15 NOTE — PROGRESS NOTES
"IP Diabetes Management  Daily Note           Assessment and Plan:   HPI: Mariusz Sharp \"Red\" is a 59 year old male with history of CAD, LV thrombus, CKD Stage III, PAF, DM Type II, and ICM s/p HM III LVAD as BTT (2018) with subsequent OHT 5/18/20, presenting with progressive SOB, cough, and fever, found to have right hilar lung mass with infiltrate. S/P bronch with biopsy 9/15. Poor PO intake, and starting enteral feeds.    Assessment:   1)Type II Diabetes Mellitus,  2) Enteral feed-induced hyperglycemia     Plan:    -stopped Lantus (using NPH to better titrate with advancing TF)   -started NPH insulin 15 units BID- give ASAP when back from procedure (admin 1000/2200 today, then 0800/2000 tomorrow)   -aspart CHO coverage with meals and snacks/supplements: 1:8g with breakfast, 1:6.5g with lunch and dinner and snacks   -aspart high intensity sliding scale TID AC, HS, and changed to threshold >140 HS and 0200 (on continuous TF)   -BG monitoring TID AC, HS, 0200   -hypoglycemia protocol   -diet with carb counting protocol   -diabetes education needs to be reassessed prior to discharge.     Outpatient follow up: TBD  Plan discussed with patient.       Interval History and Assessment: interval glucose trend reviewed:   BG stable but above target. HS BG was checked 2 hrs after dinner.    Continuous tube feeds were started at trickle rate ~1500 yesterday, continued through midnight then stopped for procedure. No Novolog given overnight, currently ordered at threshold >200.    No NPH given in the evening, and went for procedure (bronchoscopy) prior to receiving morning dose, thus essentially has no basal insulin on board at this time.     Back from procedure, resting comfortably, facemask oxygen. Complaining that NGT is uncomfortable. Food here doesn't taste good.     Current nutritional intake and type: Orders Placed This Encounter      Regular Diet Adult  continuous tube feeds: Nutren 1.5 (goal 45cc/hour, for 190g CHO " daily)  Starting 9/15 following procedure, 15cc/hour and advancing by 10 q8h.     PTA Diabetes Regimen:   CGM: Dexcom G6.  Lantus 32 units daily HS  Novolog 1:8g CHO and high intensity sliding scale AC/HS.    Discharge Planning: TBD           Diabetes History:   Type of Diabetes: Type 2 Diabetes Mellitus, TPN/Enteral Feeding Induced Hyperglycemia  Lab Results   Component Value Date    A1C 7.4 08/19/2020    A1C 7.2 08/13/2020    A1C 7.8 06/15/2020    A1C 8.0 05/12/2020    A1C 9.5 07/05/2019              Review of Systems:   The Review of Systems is negative other than noted in the Interval History.           Medications:     Current Facility-Administered Medications   Medication     acetaminophen (TYLENOL) tablet 975 mg     albuterol (PROVENTIL) neb solution 2.5 mg     aspirin (ASA) chewable tablet 81 mg     atovaquone (MEPRON) suspension 750 mg     benzocaine-menthol (CEPACOL) 15-3.6 MG lozenge 1 lozenge     calcium carbonate 600 mg-vitamin D 400 units (CALTRATE) per tablet 1 tablet     dextrose 10% infusion     glucose gel 15-30 g    Or     dextrose 50 % injection 25-50 mL    Or     glucagon injection 1 mg     hydrALAZINE (APRESOLINE) tablet 25 mg     insulin aspart (NovoLOG) injection (RAPID ACTING)     insulin aspart (NovoLOG) injection (RAPID ACTING)     insulin aspart (NovoLOG) injection (RAPID ACTING)     insulin aspart (NovoLOG) injection (RAPID ACTING)     insulin aspart (NovoLOG) injection (RAPID ACTING)     insulin aspart (NovoLOG) injection (RAPID ACTING)     insulin isophane human (HumuLIN N PEN) injection 15 Units     Lidocaine (LIDOCARE) 4 % Patch 1 patch    And     lidocaine patch in PLACE     lidocaine (LMX4) cream     lidocaine 1 % 0.1-1 mL     loperamide (IMODIUM) capsule 2 mg     magnesium sulfate 2 g in water intermittent infusion     magnesium sulfate 4 g in 100 mL sterile water (premade)     multivitamins w/minerals (CERTAVITE) liquid 15 mL     mycophenolate (GENERIC EQUIVALENT) tablet 500 mg  "    naloxone (NARCAN) injection 0.1-0.4 mg     ondansetron (ZOFRAN) tablet 4 mg     oxyCODONE (ROXICODONE) tablet 5 mg     Patient is already receiving mechanical prophylaxis     piperacillin-tazobactam (ZOSYN) 3.375 g vial to attach to  mL bag     potassium chloride (KLOR-CON) Packet 20-40 mEq     potassium chloride 10 mEq in 100 mL intermittent infusion with 10 mg lidocaine     potassium chloride 10 mEq in 100 mL sterile water intermittent infusion (premix)     potassium chloride 20 mEq in 50 mL intermittent infusion     potassium chloride ER (KLOR-CON M) CR tablet 20-40 mEq     prochlorperazine (COMPAZINE) injection 5 mg     rosuvastatin (CRESTOR) tablet 10 mg     sodium chloride (NEBUSAL) 3 % neb solution 3 mL     sodium chloride (PF) 0.9% PF flush 3 mL     sodium chloride (PF) 0.9% PF flush 3 mL     tacrolimus (GENERIC EQUIVALENT) capsule 0.5 mg     valGANciclovir (VALCYTE) tablet 450 mg            Physical Exam:    /78   Pulse 96   Temp 97.5  F (36.4  C) (Oral)   Resp 12   Ht 1.626 m (5' 4\")   Wt 79.7 kg (175 lb 9.6 oz)   SpO2 96%   BMI 30.14 kg/m    General: pleasant, in no distress, resting flat in bed   HEENT: normocephalic, atraumatic. Oral mucous membranes moist.   Lungs: unlabored respiration, no cough, facemask O2  ABD: rounded, soft  Skin: warm and dry, no obvious lesions  MSK:  moves all extremities  Lymp:  no LE edema   Mental status:  alert, oriented to self, place, time  Psych:  affect, calm and appropriate interaction          Data:     Recent Labs   Lab 09/15/20  1008 09/15/20  0825 09/15/20  0522 09/15/20  0120 09/14/20  2104 09/14/20  1714 09/14/20  1158  09/14/20  0546  09/13/20  0612  09/12/20  0545  09/11/20  0538  09/10/20  0546   GLC  --   --  240*  --   --   --   --   --  205*  --  167*  --  201*  --  130*  --  131*   * 248*  --  226* 235* 277* 182*   < >  --    < >  --    < >  --    < >  --    < >  --     < > = values in this interval not displayed.     Lab " Results   Component Value Date    WBC 4.7 09/15/2020    WBC 8.0 09/14/2020    WBC 9.0 09/13/2020    HGB 6.9 (LL) 09/15/2020    HGB 6.7 (LL) 09/15/2020    HGB 7.2 (L) 09/14/2020    HCT 21.9 (L) 09/15/2020    HCT 22.9 (L) 09/14/2020    HCT 23.3 (L) 09/13/2020    MCV 88 09/15/2020    MCV 87 09/14/2020    MCV 86 09/13/2020     09/15/2020     09/14/2020     09/13/2020     Lab Results   Component Value Date     (L) 09/15/2020     (L) 09/14/2020     (L) 09/13/2020    POTASSIUM 4.0 09/15/2020    POTASSIUM 4.2 09/14/2020    POTASSIUM 4.3 09/13/2020    CHLORIDE 100 09/15/2020    CHLORIDE 99 09/14/2020    CHLORIDE 95 09/13/2020    CO2 21 09/15/2020    CO2 22 09/14/2020    CO2 19 (L) 09/13/2020     (H) 09/15/2020     (H) 09/14/2020     (H) 09/13/2020     Lab Results   Component Value Date    BUN 40 (H) 09/15/2020    BUN 34 (H) 09/14/2020    BUN 28 09/13/2020     Lab Results   Component Value Date    TSH 3.94 06/26/2019    TSH 6.91 (H) 07/23/2018     Lab Results   Component Value Date    AST 85 (H) 09/12/2020    AST 22 09/05/2020    AST 21 09/04/2020    ALT 47 09/12/2020    ALT 15 09/05/2020    ALT 12 09/04/2020    ALKPHOS 102 09/12/2020    ALKPHOS 68 09/05/2020    ALKPHOS 66 09/04/2020       Diabetes Management Team job code: [id number]  I spent a total of 35 minutes bedside and on the inpatient unit managing glycemic care. Over 50% of my time on the unit was spent counseling the patient and/or coordinating care regarding acute hyperglycemia management.  See note for details.    Juana Ly PA-C  Inpatient Diabetes Management Service  Pager [phone]

## 2020-09-15 NOTE — OR NURSING
Procedure: Bronchoscopy with BAL (120 mL in, ~7 mL returned) and brush biopsy  Sedation: Conscious sedation (2 mg versed, 100 mcg fentanyl)  O2: 2-8 LPM Oxymask  Tolerated: VS stable during and post procedure. No abd or chest pain noted  Specimens: Specimen(s) handled by RT  Report: Given to bedside GERMAN Nichols  Pt to recovery area in stable condition, accompanied by Shannan Acosta, RN

## 2020-09-15 NOTE — PLAN OF CARE
PT 6C / Discharge Planner PT   Patient plan for discharge: home  Current status: Patient demonstrates improved tolerance for therapy today, transfers with SBA. Ambulated ~300ft with FWW + SBA, slow but steady throughout, reports fatigue and RADER, maintains SpO2>90% on RA, HR 100s.   Barriers to return to prior living situation: medical needs, deconditioning  Recommendations for discharge: anticipate patient will be safe to return home with PRN assist, pending progress in therapy may benefit from HH vs OP PT- will continue to assess  Rationale for recommendations: Patient was previously independent, making progress in therapy. Anticipate patient will be appropriate to discharge home with assist when medically ready for discharge. May benefit from continued HH vs OP PT to progress strength and activity tolerance.       Entered by: Neil Pulido 09/15/2020 3:25 PM

## 2020-09-15 NOTE — PLAN OF CARE
D/I/A:  SOB, cough, fever, RUL mass; PMH of CAD, LV thrombus, CKD stage III, PAF, DM2, ICM s/p HM III LVAD now s/p OHT on 5/18/2020.  Pt stable overnight, urine, viral and asymptomatic COVID swab sent to lab in the evening.  TF increased per order and then off at 00 for test.  Clarified with MD about BG frequency checks overnight-ok with HS and 0200.  Pt with mild back pain-relieved with lido patch.  +2BM.  Voiding.  Up ind, calls appropriately.  Continues with productive congested cough.    P:  Pt NPO for bronch today.  Update team with changes/concerns.    AM hgb 6.7-yest 7.2, no signs of bleeding noted -text paged cards cross-cover-awaiting call back.

## 2020-09-15 NOTE — PROGRESS NOTES
Calorie Count  Intake recorded for: 9/14  Total Kcals: 212 Total Protein: 10g  Kcals from Hospital Food: 212  Protein: 10g  Kcals from Outside Food (average):0 Protein: 0g  # Meals Recorded: 100% 2 diet lemonades, 75% mashed potatoes w/ gravy, 50% pineapple, banana, 25% grilled chicken   # Supplements Recorded: 0

## 2020-09-15 NOTE — PLAN OF CARE
OT 6C. Cancel. Pt OOR at endoscopy this AM. Unable to check back this PM due to scheduling. Will reschedule per POC.

## 2020-09-15 NOTE — PLAN OF CARE
D: progressive SOB, cough, fever, RUL mass; PMH of CAD, LV thrombus, CKD stage III, PAF, DM2, ICM s/p HM III LVAD now s/p OHT on 5/18/2020.  I/A: A&Ox4, lethargic. VSS on 2 LPM via oxymask, SR/ST on tele. BG q4h. K+ replaced this morning per protocol. Hgb 6.9- given 1 unit PRBC, Hgb pending. TF via ND increased to 35 mL/hr @ 1830, goal rate 45 mL/hr. Needs PRN insulin with evening meal. IV abx as ordered. Encouraged to void with urinal for UO measurement. Soft BM today per pt.   P: Nephrology consult ordered for ANDREW on CKD stage III and Thoracic surgery consult for RUL mass. Continue to follow POC and contact Cards 2 with updates/changes.

## 2020-09-16 ENCOUNTER — APPOINTMENT (OUTPATIENT)
Dept: OCCUPATIONAL THERAPY | Facility: CLINIC | Age: 60
End: 2020-09-16
Payer: COMMERCIAL

## 2020-09-16 ENCOUNTER — APPOINTMENT (OUTPATIENT)
Dept: PHYSICAL THERAPY | Facility: CLINIC | Age: 60
End: 2020-09-16
Payer: COMMERCIAL

## 2020-09-16 PROBLEM — R91.8 LUNG MASS: Status: ACTIVE | Noted: 2020-08-31

## 2020-09-16 LAB
ALBUMIN SERPL-MCNC: 1.7 G/DL (ref 3.4–5)
ALBUMIN UR-MCNC: 30 MG/DL
ALP SERPL-CCNC: 116 U/L (ref 40–150)
ALT SERPL W P-5'-P-CCNC: 79 U/L (ref 0–70)
ANION GAP SERPL CALCULATED.3IONS-SCNC: 11 MMOL/L (ref 3–14)
ANION GAP SERPL CALCULATED.3IONS-SCNC: 9 MMOL/L (ref 3–14)
APPEARANCE UR: ABNORMAL
AST SERPL W P-5'-P-CCNC: 125 U/L (ref 0–45)
BILIRUB SERPL-MCNC: 0.4 MG/DL (ref 0.2–1.3)
BILIRUB UR QL STRIP: NEGATIVE
BUN SERPL-MCNC: 54 MG/DL (ref 7–30)
BUN SERPL-MCNC: 54 MG/DL (ref 7–30)
CALCIUM SERPL-MCNC: 8.2 MG/DL (ref 8.5–10.1)
CALCIUM SERPL-MCNC: 8.3 MG/DL (ref 8.5–10.1)
CHLORIDE SERPL-SCNC: 102 MMOL/L (ref 94–109)
CHLORIDE SERPL-SCNC: 99 MMOL/L (ref 94–109)
CO2 SERPL-SCNC: 21 MMOL/L (ref 20–32)
CO2 SERPL-SCNC: 23 MMOL/L (ref 20–32)
COLOR UR AUTO: YELLOW
COPATH REPORT: NORMAL
CREAT SERPL-MCNC: 3.68 MG/DL (ref 0.66–1.25)
CREAT SERPL-MCNC: 3.88 MG/DL (ref 0.66–1.25)
CREAT UR-MCNC: 61 MG/DL
ERYTHROCYTE [DISTWIDTH] IN BLOOD BY AUTOMATED COUNT: 13.7 % (ref 10–15)
GFR SERPL CREATININE-BSD FRML MDRD: 16 ML/MIN/{1.73_M2}
GFR SERPL CREATININE-BSD FRML MDRD: 17 ML/MIN/{1.73_M2}
GLUCOSE BLDC GLUCOMTR-MCNC: 235 MG/DL (ref 70–99)
GLUCOSE BLDC GLUCOMTR-MCNC: 249 MG/DL (ref 70–99)
GLUCOSE BLDC GLUCOMTR-MCNC: 249 MG/DL (ref 70–99)
GLUCOSE BLDC GLUCOMTR-MCNC: 258 MG/DL (ref 70–99)
GLUCOSE BLDC GLUCOMTR-MCNC: 321 MG/DL (ref 70–99)
GLUCOSE SERPL-MCNC: 237 MG/DL (ref 70–99)
GLUCOSE SERPL-MCNC: 274 MG/DL (ref 70–99)
GLUCOSE UR STRIP-MCNC: 70 MG/DL
HCT VFR BLD AUTO: 26.4 % (ref 40–53)
HGB BLD-MCNC: 8.5 G/DL (ref 13.3–17.7)
HGB UR QL STRIP: ABNORMAL
IGE SERPL-ACNC: 118 KIU/L (ref 0–114)
IRON SATN MFR SERPL: 17 % (ref 15–46)
IRON SERPL-MCNC: 30 UG/DL (ref 35–180)
KETONES UR STRIP-MCNC: NEGATIVE MG/DL
LACTATE BLD-SCNC: 1.6 MMOL/L (ref 0.7–2)
LEUKOCYTE ESTERASE UR QL STRIP: ABNORMAL
MAGNESIUM SERPL-MCNC: 2.3 MG/DL (ref 1.6–2.3)
MCH RBC QN AUTO: 28.2 PG (ref 26.5–33)
MCHC RBC AUTO-ENTMCNC: 32.2 G/DL (ref 31.5–36.5)
MCV RBC AUTO: 88 FL (ref 78–100)
MUCOUS THREADS #/AREA URNS LPF: PRESENT /LPF
NITRATE UR QL: NEGATIVE
PH UR STRIP: 5.5 PH (ref 5–7)
PHOSPHATE SERPL-MCNC: 3 MG/DL (ref 2.5–4.5)
PLATELET # BLD AUTO: 404 10E9/L (ref 150–450)
POTASSIUM SERPL-SCNC: 4.6 MMOL/L (ref 3.4–5.3)
POTASSIUM SERPL-SCNC: 4.6 MMOL/L (ref 3.4–5.3)
PROT SERPL-MCNC: 5.6 G/DL (ref 6.8–8.8)
RBC # BLD AUTO: 3.01 10E12/L (ref 4.4–5.9)
RBC #/AREA URNS AUTO: 9 /HPF (ref 0–2)
SODIUM SERPL-SCNC: 131 MMOL/L (ref 133–144)
SODIUM SERPL-SCNC: 133 MMOL/L (ref 133–144)
SODIUM UR-SCNC: 52 MMOL/L
SOURCE: ABNORMAL
SP GR UR STRIP: 1.01 (ref 1–1.03)
TACROLIMUS BLD-MCNC: 7.4 UG/L (ref 5–15)
TIBC SERPL-MCNC: 176 UG/DL (ref 240–430)
TME LAST DOSE: NORMAL H
UROBILINOGEN UR STRIP-MCNC: NORMAL MG/DL (ref 0–2)
WBC # BLD AUTO: 3.4 10E9/L (ref 4–11)
WBC #/AREA URNS AUTO: 5 /HPF (ref 0–5)

## 2020-09-16 PROCEDURE — 93010 ELECTROCARDIOGRAM REPORT: CPT | Performed by: INTERNAL MEDICINE

## 2020-09-16 PROCEDURE — 83540 ASSAY OF IRON: CPT | Performed by: NURSE PRACTITIONER

## 2020-09-16 PROCEDURE — 81001 URINALYSIS AUTO W/SCOPE: CPT | Performed by: PHYSICIAN ASSISTANT

## 2020-09-16 PROCEDURE — 80197 ASSAY OF TACROLIMUS: CPT | Performed by: NURSE PRACTITIONER

## 2020-09-16 PROCEDURE — 84100 ASSAY OF PHOSPHORUS: CPT | Performed by: NURSE PRACTITIONER

## 2020-09-16 PROCEDURE — 25000131 ZZH RX MED GY IP 250 OP 636 PS 637: Performed by: PHYSICIAN ASSISTANT

## 2020-09-16 PROCEDURE — 00000146 ZZHCL STATISTIC GLUCOSE BY METER IP

## 2020-09-16 PROCEDURE — 36415 COLL VENOUS BLD VENIPUNCTURE: CPT

## 2020-09-16 PROCEDURE — 82570 ASSAY OF URINE CREATININE: CPT | Performed by: PHYSICIAN ASSISTANT

## 2020-09-16 PROCEDURE — 25000132 ZZH RX MED GY IP 250 OP 250 PS 637: Performed by: NURSE PRACTITIONER

## 2020-09-16 PROCEDURE — 25000131 ZZH RX MED GY IP 250 OP 636 PS 637: Performed by: NURSE PRACTITIONER

## 2020-09-16 PROCEDURE — 80048 BASIC METABOLIC PNL TOTAL CA: CPT | Performed by: PHYSICIAN ASSISTANT

## 2020-09-16 PROCEDURE — 83550 IRON BINDING TEST: CPT | Performed by: NURSE PRACTITIONER

## 2020-09-16 PROCEDURE — 25000132 ZZH RX MED GY IP 250 OP 250 PS 637: Performed by: PHYSICIAN ASSISTANT

## 2020-09-16 PROCEDURE — 25000128 H RX IP 250 OP 636

## 2020-09-16 PROCEDURE — 25000131 ZZH RX MED GY IP 250 OP 636 PS 637: Performed by: CLINICAL NURSE SPECIALIST

## 2020-09-16 PROCEDURE — 25000132 ZZH RX MED GY IP 250 OP 250 PS 637: Performed by: STUDENT IN AN ORGANIZED HEALTH CARE EDUCATION/TRAINING PROGRAM

## 2020-09-16 PROCEDURE — 36415 COLL VENOUS BLD VENIPUNCTURE: CPT | Performed by: PHYSICIAN ASSISTANT

## 2020-09-16 PROCEDURE — 80053 COMPREHEN METABOLIC PANEL: CPT | Performed by: NURSE PRACTITIONER

## 2020-09-16 PROCEDURE — 85027 COMPLETE CBC AUTOMATED: CPT | Performed by: NURSE PRACTITIONER

## 2020-09-16 PROCEDURE — 83735 ASSAY OF MAGNESIUM: CPT | Performed by: NURSE PRACTITIONER

## 2020-09-16 PROCEDURE — 84484 ASSAY OF TROPONIN QUANT: CPT

## 2020-09-16 PROCEDURE — 25800030 ZZH RX IP 258 OP 636: Performed by: PHYSICIAN ASSISTANT

## 2020-09-16 PROCEDURE — 83605 ASSAY OF LACTIC ACID: CPT | Performed by: INTERNAL MEDICINE

## 2020-09-16 PROCEDURE — 84300 ASSAY OF URINE SODIUM: CPT | Performed by: PHYSICIAN ASSISTANT

## 2020-09-16 PROCEDURE — 25000128 H RX IP 250 OP 636: Performed by: NURSE PRACTITIONER

## 2020-09-16 PROCEDURE — 36415 COLL VENOUS BLD VENIPUNCTURE: CPT | Performed by: INTERNAL MEDICINE

## 2020-09-16 PROCEDURE — 25000128 H RX IP 250 OP 636: Performed by: INTERNAL MEDICINE

## 2020-09-16 PROCEDURE — 36415 COLL VENOUS BLD VENIPUNCTURE: CPT | Performed by: NURSE PRACTITIONER

## 2020-09-16 PROCEDURE — 97535 SELF CARE MNGMENT TRAINING: CPT | Mod: GO

## 2020-09-16 PROCEDURE — 21400000 ZZH R&B CCU UMMC

## 2020-09-16 PROCEDURE — 97116 GAIT TRAINING THERAPY: CPT | Mod: GP | Performed by: REHABILITATION PRACTITIONER

## 2020-09-16 RX ORDER — TACROLIMUS 1 MG/1
1 CAPSULE ORAL
Status: DISCONTINUED | OUTPATIENT
Start: 2020-09-16 | End: 2020-09-17

## 2020-09-16 RX ORDER — PIPERACILLIN SODIUM, TAZOBACTAM SODIUM 2; .25 G/10ML; G/10ML
2.25 INJECTION, POWDER, LYOPHILIZED, FOR SOLUTION INTRAVENOUS EVERY 6 HOURS
Status: DISCONTINUED | OUTPATIENT
Start: 2020-09-17 | End: 2020-09-18

## 2020-09-16 RX ORDER — LABETALOL 20 MG/4 ML (5 MG/ML) INTRAVENOUS SYRINGE
10 ONCE
Status: COMPLETED | OUTPATIENT
Start: 2020-09-17 | End: 2020-09-16

## 2020-09-16 RX ADMIN — PIPERACILLIN AND TAZOBACTAM 3.38 G: 3; .375 INJECTION, POWDER, FOR SOLUTION INTRAVENOUS at 08:58

## 2020-09-16 RX ADMIN — PIPERACILLIN AND TAZOBACTAM 3.38 G: 3; .375 INJECTION, POWDER, FOR SOLUTION INTRAVENOUS at 14:17

## 2020-09-16 RX ADMIN — Medication 25 MG: at 09:03

## 2020-09-16 RX ADMIN — LABETALOL 20 MG/4 ML (5 MG/ML) INTRAVENOUS SYRINGE 10 MG: at 23:55

## 2020-09-16 RX ADMIN — INSULIN ASPART 5 UNITS: 100 INJECTION, SOLUTION INTRAVENOUS; SUBCUTANEOUS at 10:14

## 2020-09-16 RX ADMIN — MYCOPHENOLATE MOFETIL 500 MG: 500 TABLET ORAL at 18:00

## 2020-09-16 RX ADMIN — LIDOCAINE 1 PATCH: 560 PATCH PERCUTANEOUS; TOPICAL; TRANSDERMAL at 19:46

## 2020-09-16 RX ADMIN — MULTIVITAMIN 15 ML: LIQUID ORAL at 09:03

## 2020-09-16 RX ADMIN — TACROLIMUS 1 MG: 1 CAPSULE ORAL at 18:00

## 2020-09-16 RX ADMIN — Medication 1 TABLET: at 09:03

## 2020-09-16 RX ADMIN — ROSUVASTATIN CALCIUM 10 MG: 10 TABLET, FILM COATED ORAL at 21:38

## 2020-09-16 RX ADMIN — Medication 37.5 MG: at 19:45

## 2020-09-16 RX ADMIN — PROCHLORPERAZINE EDISYLATE 5 MG: 5 INJECTION INTRAMUSCULAR; INTRAVENOUS at 05:21

## 2020-09-16 RX ADMIN — Medication 37.5 MG: at 16:19

## 2020-09-16 RX ADMIN — TACROLIMUS 0.5 MG: 0.5 CAPSULE ORAL at 09:03

## 2020-09-16 RX ADMIN — PIPERACILLIN AND TAZOBACTAM 3.38 G: 3; .375 INJECTION, POWDER, FOR SOLUTION INTRAVENOUS at 02:16

## 2020-09-16 RX ADMIN — INSULIN ASPART 3 UNITS: 100 INJECTION, SOLUTION INTRAVENOUS; SUBCUTANEOUS at 00:25

## 2020-09-16 RX ADMIN — INSULIN ASPART 2 UNITS: 100 INJECTION, SOLUTION INTRAVENOUS; SUBCUTANEOUS at 17:15

## 2020-09-16 RX ADMIN — PIPERACILLIN AND TAZOBACTAM 3.38 G: 3; .375 INJECTION, POWDER, FOR SOLUTION INTRAVENOUS at 19:46

## 2020-09-16 RX ADMIN — Medication 1 TABLET: at 18:00

## 2020-09-16 RX ADMIN — SODIUM CHLORIDE 500 ML: 9 INJECTION, SOLUTION INTRAVENOUS at 14:54

## 2020-09-16 RX ADMIN — MYCOPHENOLATE MOFETIL 500 MG: 500 TABLET ORAL at 09:03

## 2020-09-16 RX ADMIN — BENZOCAINE AND MENTHOL 1 LOZENGE: 15; 3.6 LOZENGE ORAL at 16:22

## 2020-09-16 ASSESSMENT — ACTIVITIES OF DAILY LIVING (ADL)
ADLS_ACUITY_SCORE: 16

## 2020-09-16 NOTE — PROGRESS NOTES
[facility]   Cardiology II Service / Advanced Heart Failure  Daily Progress Note      Patient: Mariusz Sharp  MRN: [id number]  Admission Date: 8/31/2020  Hospital Day # 16    Assessment and Plan: Mariusz Sharp is a 59 year old male with PMH of CAD, LV thrombus, CKD Stage III, PAF, DM Type II, and ICM s/p HM III LVAD as BTT with subsequent OHT 5/18/20. He presents with progressive SOB, cough, and fever.      Plan today:  - Discontinued Posaconazole 9/13  - Discontinued Atovaquone 9/15  - Discuss further tissue culture with surgery- consult placed  - Free water flushes increased to 80 [id number]  - Nephrology consult pending  - Thoracic consult pending  - ASA discontinued.      Fever with Cough secondary to RUL mass complicated by Postobstructive Pneumoania. Tmax 102 prior to admission. History of rhinovirus. CDIF, LA, UA, COVID, Legionella, Cryptococcus, Strep pneumo negative, Aspergillus, RSV, CMV, and EBV negative. Renal US notes resolution of prior stone. History of PAcnes to outflow graft and Donor positive S Anginosus and Veillonella (completed course of antibiotics). Concern for Chronic Prostatitis. Vanco/Zosyn discontinued 9/1. CT Chest positive for RUL mass concerning for PTLD vs infection. Beta D glucan 90 and trending upward. Appreciate IR Pulm consult. S/p EBUS with BAL 9/8 with negative cytology and PJP. LDH-380. Absolute CD4-359. NonContrast Chest CT 9/13 consistent with increased RUL mass with progressive post obstructive PNA.  - Appreciate Transplant ID Consult.    - Tissue for histopathology, AFB, bacterial, and fungal NTD from 9/8 EBUS.   - Repeat bronch with noted expansion of mass and difficulty obtained tissue biopsy, brushings obtained with cultures pending. Review with surgery to obtain biopsy.    - Blood cultures NGTD, repeated 9/9.  - Posaconazole discontinued 9/15  - IGM-29, IGG-510, IGA-108, CMV negative, annd UA negative. IGE, viral urine culture, and UC pending NGTD  -  Atovaquone discontinued.    - Cefdinir transitioned to IV Zosyn.   - NS and Albuterol nebs for clearance.      Diarrhea, Nausea, and Vomiting. CDIF negative and CMV negative. Repeat stool cultures negative.    - Compazine prn nausea.      S/p OHT secondary to ICM. 5/18/20. Echo 8/31 with EF 70% and normal graft function.   RHC 8/26 with mRA-3, RV-20/2, mPA-15, mPCWP-10, AMRIT CO-4.91, AMRIT CI-2.63, biopsy negative. Immuknow 33 9/3 with reduction in Cellcept above.   Immunosuppression: Simulect 5/18 and 5/22. Tac 0.5 mg po BID, Tac level today pending. Goal-8-10. Cellcept 500 mg po BID (decreased d/t 9/3 Immuknow and infection), and Prednisone completed 8/27/20.   Serostatus: CMV: D+, R-, EBV: D- R+, Toxo D- R-  Prophylaxis: Valcyte. Atovaquone per ID as above.   - Continue Crestor.   - ASA discontinued in setting of hematuria.   - Next EMB due 9/30, recheck Immuknow at this time as well.      HTN. Prior to admission Norvasc on  Hold.   - Increase hydralazine 37.5 TID    ANDREW on CKD Stage III. Cr prior to admission 1.3-1.7. UA consistent with 30 protein, 53 RBC, and moderate blood. Multiple renal ultrasounds with non-obstructive stone and no hydronephrosis.   - Nephrology consult today  - Monitor Tac daily.   - Encourage po. Increased his free water flushes to 80 ml q4h  - 500 ml NS challenge today, will complete prior to his PM labs  - Q12 hour BMP     DM Type II.   - Management per Endo appreciated.      Elevated PSA with Concern for Chronic Prostatitis. PSA-8.21. MRI consistent with BPH.   - Cefdinir discontinued, Zosyn initiated for post obstructive CAP as above.   - Hematuria per UA.      Protein Calore Malnutrition. Decreased po intake with need for fluid resuscitation. Weight down 5 lbs from admission. Albumin 1.9 Prealbumin 4.  - Appreciate Nutrition consult.    - Continue NJ tube feeds until tolerating adequate po.     Anemia. Baseline Hgb 7-8.   - Repeat Iron levels in AM.   - 1 unit PRBC 9/16 for Hgb 6.7.  "  - Heparin discontinued.   - UA with moderate hematuria, ASA discontinued.      FEN: Moderate Carb diet   PROPHY: Heparin subcutaneous discontinued with drop in Hgb.   LINES: PIV  DISPO: TBD. Therapy recommending TCU at this time  CODE STATUS: Full Code   ================================================================  Interval History/ROS: He is feeling okay today. Not as good as yesterday, better than a few days ago.No more fevers or chills. He notes persistent productive cough and shortness of breath, althugh improving. Yesterday he walked down the reddy and felt quite SOB. He denies palpitations. He notes improvement to nausea and denies vomiting. He notes stable 1-2 loose stools daily. He also notes mild LE edema. He is tolerating minimal po and minimal activity at this time.     Last 24 hr care team notes reviewed.   ROS:  4 point ROS including Respiratory, CV, GI and , other than that noted in the HPI, is negative.     Medications: Reviewed in EPIC.     Physical Exam:   BP (!) 133/94 (BP Location: Left arm, Cuff Size: Adult Regular)   Pulse 104   Temp 97.4  F (36.3  C) (Oral)   Resp 18   Ht 1.626 m (5' 4\")   Wt 79.7 kg (175 lb 9.6 oz)   SpO2 93%   BMI 30.14 kg/m    GENERAL: Appears alert and interacting appropriatly   HEENT: Eye symmetrical and free of discharge bilaterally. Mucous membranes moist and without lesions.  NECK: Supple and without lymphadenopathy. JVD below clavicular line upright, lower third of neck at 45 degrees  CV: RRR, S1S2 present without murmur, rub, or gallop.   RESPIRATORY: Respirations regular, even, and unlabored. Lungs CTA throughout.   GI: Soft and non distended with normoactive bowel sounds present in all quadrants. No tenderness, rebound, guarding. No organomegaly.   EXTREMITIES: Trace b/l bilateral LE peripheral edema. 2+ bilateral pedal pulses.   NEUROLOGIC: Alert and orientated x 3. CN II-XII grossly intact. No focal deficits.   MUSCULOSKELETAL: No joint swelling or " tenderness.   SKIN: No jaundice. No rashes or lesions.     Data:  CMP  Recent Labs   Lab 09/16/20  0541 09/15/20  1412 09/15/20  0522 09/14/20  0546 09/13/20  0612 09/12/20  0545   * 132* 131* 129* 127* 128*   POTASSIUM 4.6 4.5 4.0 4.2 4.3 4.1   CHLORIDE 99 102 100 99 95 98   CO2 21 23 21 22 19* 22   ANIONGAP 11 7 10 8 13 8   * 261* 240* 205* 167* 201*   BUN 54* 40* 40* 34* 28 31*   CR 3.68* 3.12* 2.73* 2.01* 1.90* 1.82*   GFRESTIMATED 17* 21* 25* 36* 38* 40*   GFRESTBLACK 20* 24* 28* 41* 44* 46*   ARLENE 8.2* 8.1* 8.3* 8.3* 8.6 8.9   MAG 2.3  --  2.2 1.7 1.6 2.2   PHOS 3.0  --  4.3 3.6  --   --    PROTTOTAL 5.6*  --   --   --   --  6.2*   ALBUMIN 1.7*  --   --   --   --  1.9*   BILITOTAL 0.4  --   --   --   --  0.6   ALKPHOS 116  --   --   --   --  102   *  --   --   --   --  85*   ALT 79*  --   --   --   --  47     CBC  Recent Labs   Lab 09/16/20  0541 09/15/20  1827 09/15/20  0642 09/15/20  0522 09/14/20  0546 09/13/20  0612   WBC 3.4*  --   --  4.7 8.0 9.0   RBC 3.01*  --   --  2.48* 2.64* 2.70*   HGB 8.5* 8.0* 6.9* 6.7* 7.2* 7.2*   HCT 26.4*  --   --  21.9* 22.9* 23.3*   MCV 88  --   --  88 87 86   MCH 28.2  --   --  27.0 27.3 26.7   MCHC 32.2  --   --  30.6* 31.4* 30.9*   RDW 13.7  --   --  13.6 13.6 13.6     --   --  [phone]     Patient discussed with Dr. Nielson.      Betsey Lindsay PA-C  [facility]

## 2020-09-16 NOTE — PLAN OF CARE
"PT - 6C  Discharge Planner PT   Patient plan for discharge: Home; reports he does not have insurance coverage for TCU  Current status: Pt more fatigued today but agreeable to therapy. Transfers sit<>stand CGA, ambulates 150', 150' CGA using FWW with extended break in between 2/2 fatigue/SOB. Pt feels he has \"more energy\" than during early morning OT session. Pt performs toe taps x3 bilaterally then step ups x3 bilaterally after extended rest break, CGA with Min A two times to steady during minor LOB while in single leg stance.   Barriers to return to prior living situation: Medical needs, deconditioning  Recommendations for discharge: home with assist pending progress with stairs and gait distance in therapy; may benefit from HH vs OP PT- will continue to assess  Rationale for recommendations: Patient previously independent, making progress in therapy. Anticipate patient will be appropriate to discharge home with assist when medically ready for discharge. May benefit from continued HH vs OP PT to progress strength and activity tolerance.  Entered by: Hugo Franz 09/16/2020 1:20 PM       "

## 2020-09-16 NOTE — PROGRESS NOTES
"Transplant Social Work Services Progress Note      Date of Initial Social Work Evaluation: Original Eval-7/17/18. Admission assessment completed 9/1/2020   Collaborated with: Patient    Data: Mariusz remains hospitalized on 6C recovering and being treated for some sort of infectious process. He received a heart transplant in May of 2020 and reports that after 6 weeks, things started to \"go downhill\". He reports feeling nauseous at times and has an [place] for tube feedings.  Intervention: Met with Red in his room. Offered emotional support and provided encouragement. Inquired into questions/concerns and assessed coping.  Assessment: Red voices frustration with course post-transplant and that he felt better after VAD in 2018. Flat affect, but seems to be coping appropriately considering not feeling well at this point. Reports able to connect with friends and family.  Education provided by CM: Ongoing SW availability  Plan:    Discharge Plans in Progress: TBD, but PT and OT recommending TCU. However, patient does not have insurance coverage for TCU    Barriers to d/c plan: Medical condition    Follow up Plan: CM will continue to follow  "

## 2020-09-16 NOTE — PROGRESS NOTES
CLINICAL NUTRITION SERVICES    Received page from Endo team. Pt dislikes Boost Glucose Control but likes berry and orange Boost Breeze    INTERVENTIONS:  Implementation:  Medical food supplement therapy: Discontinued Boost Glucose Control. Scheduled Boost Breeze (berry or orange) at breakfast, lunch, and supper to help with glycemic control.     Follow up/Monitoring:  Will continue to follow pt.    Dorcas Meek, MS, RD, LD, [facility]   6C Pgr: [phone]

## 2020-09-16 NOTE — CONSULTS
Nephrology Initial Consult  September 16, 2020      Mariusz Sharp MRN:[id number] YOB: 1960  Date of Admission:8/31/2020  Primary care provider: Milad Fry  Requesting physician: Nasreen Nielson, *    ASSESSMENT AND RECOMMENDATIONS:   Mariusz Sharp is a 59 year old male with PMH of ICM s/p HM III LVAD as BTT with subsequent OHT 5/18/20, LV thrombus, DM Type II, . He presents with progressive SOB, cough, and fever and diarrhea.    REASON FOR CONSULT: ANDREW    #ANDREW on CKD II-III of unclear etiology   #Oliguria   Patients baseline Scr is likey ~1.3. His lowest recorded Scr was 1.13 on 9/3. His Scr has been gradually worsening to ~1.4 until 9/8. The Scr increased from 1.7 to 3.6 from 9/9- 9/16.  Patient has diarrhea on admission which has since resolved. No Periferal edema, lung crackles or JVD on exam. Patient has normal cardiac function on echo (8/31). No reported diarrhea in the past week. Volume challenge to rule out pre-renal cause would not be unreasonable in the setting of an ongoing suspected infection. Renal U/S showed a non-obstructive calculus in the right kidney with no hydro, ruling out post renal causes. Patient has been on several nephrotoxic agents this hospital stay(Zosyn (9/13), Atovaquone(9/12), Posaconazole (9/4), Valganacyclovir (9/1), Tacrolimus with several supra theraputic levels. No specific agent definitively corresponds to worsening kidney function. UA was notable for moderate RBC with no WBC's making AIN less likely. Cannot rule out GN and AIN. Could differentiate ATN from pre-renal causes with a urine NA and FeNa.   - Please order Urine Na, FeNA, and repeat UA  - Please order a Fluid challenge 1L NS  - Please continue to hold nephrotoxins   - Will consider Renal biopsy to definitively rule out GN/AIN 2/2 PTLD/nephrotixic antibiotics. Patient would need to be off ASA for renal biopsy.       Recommendations were communicated to primary team via this note and in  person    Seen and discussed with Dr. Marcio Peterson MD  Internal Medicine Resident (PGY1)  [id number]    HISTORY OF PRESENT ILLNESS:  Mariusz Shrap is a 59 year old male with PMH of ICM s/p HM III LVAD as BTT with subsequent OHT 5/18/20, LV thrombus, DM Type II, . He presents with progressive SOB, cough, fever and diarrhea. Patient was found to have a RUL legion on CT. Biopsy was non-diagnostic. Patient scheduled for a repeat biopsy on 9/17. Pan-culture and fungal studies negative till date. Cannot rule out PTLD vs lymphoma at this point in time.    Patients baseline Scr is likey ~1.3. His lowest recorded Scr was 1.13 on 9/3. His Scr has been gradually worsening to ~1.4 until 9/8. The Scr increased from 1.7 to 3.6 from 9/9- 9/16.  Patient has diarrhea on admission which has since resolved. No Periferal edema, lung crackles or JVD on exam. Patient has normal cardiac function on echo (8/31). No reported diarrhea in the past week. Volume challenge to rule out pre-renal cause would not be unreasonable in the setting of an ongoing suspected infection. Renal U/S showed a non-obstructive calculus in the right kidney with no hydro, ruling out post renal causes. Patient has been on several nephrotoxic agents this hospital stay(Zosyn (9/13), Atovaquone(9/12), Posaconazole (9/4), Valganacyclovir (9/1), Tacrolimus with several supra theraputic levels. No specific agent definitively corresponds to worsening kidney function. UA was notable for moderate RBC with no WBC's making AIN less likely. Cannot rule out GN and AIN. Could differentiate ATN from pre-renal causes with a urine NA and FeNa.     PAST MEDICAL HISTORY:  Reviewed with patient on 09/16/2020     Past Medical History:   Diagnosis Date     Acute kidney injury (H)      CKD (chronic kidney disease)      Coronary artery disease      Diabetes mellitus (H)      HTN (hypertension)      Hyperlipidemia      ICD (implantable cardioverter-defibrillator), single chamber-  NOT dependent 7/17/2018     Ischemic cardiomyopathy      LV (left ventricular) mural thrombus      Paroxysmal atrial flutter (H)      RVF (right ventricular failure) (H)      Systolic heart failure (H)      Ventricular tachycardia (H)        Past Surgical History:   Procedure Laterality Date     BRONCHOSCOPY (RIGID OR FLEXIBLE), DIAGNOSTIC N/A 9/15/2020    Procedure: BRONCHOSCOPY, WITH BRONCHOALVEOLAR LAVAGE;  Surgeon: Elder Mejia MD;  Location:  GI     BRONCHOSCOPY RIGID OR FLEXIBLE W/TRANSENDOSCOPIC ENDOBRONCHIAL ULTRASOUND GUIDED Right 9/8/2020    Procedure: BRONCHOSCOPY, WITH ENDOBRONCHIAL ULTRASOUND;  Surgeon: Donny Mercedes MD;  Location:  GI     COLONOSCOPY N/A 12/7/2018    Procedure: COLONOSCOPY;  Surgeon: Krish Mercado MD;  Location: [facility] OR     CV HEART BIOPSY N/A 5/24/2020    Procedure: Heart Biopsy;  Surgeon: Kit Bacon MD;  Location:  HEART CARDIAC CATH LAB     CV HEART BIOPSY N/A 6/1/2020    Procedure: CV HEART BIOPSY;  Surgeon: Kit Bacon MD;  Location:  HEART CARDIAC CATH LAB     CV HEART BIOPSY N/A 6/8/2020    Procedure: CV HEART BIOPSY;  Surgeon: Kit Bacon MD;  Location:  HEART CARDIAC CATH LAB     CV HEART BIOPSY N/A 6/15/2020    Procedure: CV HEART BIOPSY;  Surgeon: Kit Bacon MD;  Location:  HEART CARDIAC CATH LAB     CV HEART BIOPSY N/A 6/30/2020    Procedure: CV HEART BIOPSY;  Surgeon: Kit Bacon MD;  Location:  HEART CARDIAC CATH LAB     CV HEART BIOPSY N/A 7/14/2020    Procedure: CV HEART BIOPSY;  Surgeon: Brian Long MD;  Location:  HEART CARDIAC CATH LAB     CV HEART BIOPSY N/A 7/29/2020    Procedure: CV HEART BIOPSY;  Surgeon: Momo Hunt MD;  Location:  HEART CARDIAC CATH LAB     CV HEART BIOPSY N/A 8/13/2020    Procedure: CV HEART BIOPSY;  Surgeon: Khris Mcclelland MD;  Location:  HEART CARDIAC CATH LAB     CV HEART BIOPSY N/A  8/26/2020    Procedure: CV HEART BIOPSY;  Surgeon: Momo Hunt MD;  Location:  HEART CARDIAC CATH LAB     CV RIGHT HEART CATH N/A 2/19/2019    Procedure: RHC;  Surgeon: Brian Long MD;  Location:  HEART CARDIAC CATH LAB     CV RIGHT HEART CATH N/A 7/5/2019    Procedure: CV RIGHT HEART CATH;  Surgeon: Khris Mcclelland MD;  Location:  HEART CARDIAC CATH LAB     CV RIGHT HEART CATH N/A 5/24/2020    Procedure: Right Heart Cath;  Surgeon: Kit Bacon MD;  Location:  HEART CARDIAC CATH LAB     CV RIGHT HEART CATH N/A 6/1/2020    Procedure: CV RIGHT HEART CATH;  Surgeon: Kit Bacon MD;  Location:  HEART CARDIAC CATH LAB     CV RIGHT HEART CATH N/A 6/8/2020    Procedure: CV RIGHT HEART CATH;  Surgeon: Kit Bacon MD;  Location:  HEART CARDIAC CATH LAB     CV RIGHT HEART CATH N/A 6/15/2020    Procedure: CV RIGHT HEART CATH;  Surgeon: Kit Bacon MD;  Location:  HEART CARDIAC CATH LAB     CV RIGHT HEART CATH N/A 6/30/2020    Procedure: CV RIGHT HEART CATH;  Surgeon: Kit Bacon MD;  Location:  HEART CARDIAC CATH LAB     CV RIGHT HEART CATH N/A 7/14/2020    Procedure: CV RIGHT HEART CATH;  Surgeon: Brian Long MD;  Location:  HEART CARDIAC CATH LAB     CV RIGHT HEART CATH N/A 7/29/2020    Procedure: CV RIGHT HEART CATH;  Surgeon: Momo Hunt MD;  Location:  HEART CARDIAC CATH LAB     CV RIGHT HEART CATH N/A 8/13/2020    Procedure: CV RIGHT HEART CATH;  Surgeon: Khris Mcclelland MD;  Location:  HEART CARDIAC CATH LAB     CV RIGHT HEART CATH N/A 8/26/2020    Procedure: CV RIGHT HEART CATH;  Surgeon: Momo Hunt MD;  Location:  HEART CARDIAC CATH LAB     HC PRQ TRLUML CORONARY ANGIOPLASTY ADDL BRANCH      PCI and ALYSSA to ostial LAD on 6/27/18     IMPLANT AUTOMATIC IMPLANTABLE CARDIOVERTER DEFIBRILLATOR       INSERT VENTRICULAR ASSIST DEVICE LEFT (HEARTMATE II)  N/A 12/31/2018    Procedure: Median Sternotomy, On Cardiopulmonary Bypass Pump, Insertion of Left Ventricular Assist Device (Heartmate III);  Surgeon: Kamaljit Baker MD;  Location: [facility] OR     PICC DOUBLE LUMEN PLACEMENT Right 05/22/2020    5FR DL PICC inserted at right basilic vein, length 38cm.     TRANSPLANT HEART RECIPIENT AFTER HEART MATE N/A 5/18/2020    Procedure: REDO MEDIAN STERNOTOMY, LYSIS OF ADHESIONS, ON CARDIOPULMONARY BYPASS PUMP, HEART TRANSPLANT RECIPIENT, REMOVAL OF LEFT VENTRICULAR ASSIST DEVICE, REMOVAL OF AUTOMATED IMPLANTABLE CARDIOVERTER DEFIBRILLATOR;  Surgeon: Elder Willis MD;  Location: [facility] OR        MEDICATIONS:  PTA Meds  Prior to Admission medications    Medication Sig Last Dose Taking? Auth Provider   acetaminophen (TYLENOL) 325 MG tablet Take 2 tablets (650 mg) by mouth every 4 hours as needed for other (multimodal surgical pain management along with NSAIDS and opioid medication as indicated based on pain control and physical function.) 8/31/2020 at Unknown time Yes Damari Ohara APRN CNP   amLODIPine (NORVASC) 5 MG tablet Take 2 tablets (10 mg) by mouth At Bedtime 8/30/2020 at Unknown time Yes Jenni Ortiz MD   aspirin (ASA) 81 MG chewable tablet Take 1 tablet (81 mg) by mouth daily 8/31/2020 at Unknown time Yes Jenni Ortiz MD   calcium carbonate 600 mg-vitamin D 400 units (CALTRATE) 600-400 MG-UNIT per tablet Take 1 tablet by mouth 2 times daily (with meals) 8/31/2020 at Unknown time Yes Damari Ohara APRN CNP   cefdinir (OMNICEF) 300 MG capsule Take 1 capsule (300 mg) by mouth 2 times daily 8/31/2020 at Unknown time Yes Samuel Geller MD   Continuous Blood Gluc Transmit (DEXCOM G6 TRANSMITTER) MISC 1 each every 3 months 8/31/2020 at Unknown time Yes Jeannette Schafer PA-C   hydrALAZINE (APRESOLINE) 25 MG tablet Take 1 tablet (25 mg) by mouth 3 times daily 8/31/2020 at Unknown time Yes Jenni Ortiz MD   insulin aspart (NOVOLOG PEN)  100 UNIT/ML pen 1 unit / 8  gms CHO with meals,plus correction insulin. Pt uses approx 40 units in 24 hrs. 8/31/2020 at Unknown time Yes Jeannette Schafer PA-C   insulin aspart (NOVOLOG PEN) 100 UNIT/ML pen Correction Scale - HIGH INSULIN RESISTANCE DOSING     Do Not give Correction Insulin if Pre-Meal BG less than 140.   For Pre-Meal  - 164 give 1 unit.   For Pre-Meal  - 189 give 2 units.   For Pre-Meal  - 214 give 3 units.   For Pre-Meal  - 239 give 4 units.   For Pre-Meal  - 264 give 5 units.   For Pre-Meal  - 289 give 6 units.   For Pre-Meal  - 314 give 7 units.   For Pre-Meal  - 339 give 8 units.   For Pre-Meal  - 364 give 9 units.   For Pre-Meal BG greater than or equal to 365 give 10 units  To be given with prandial insulin, and based on pre-meal blood glucose.   Notify provider if glucose greater than or equal to 350 mg/dL after administration of correction dose. If given at mealtime, administer within 30 minutes of start of meal 8/31/2020 at Unknown time Yes Damari Ohara APRN CNP   insulin aspart (NOVOLOG PEN) 100 UNIT/ML pen Inject 1-7 Units Subcutaneous At Bedtime HIGH INSULIN RESISTANCE DOSING    Do Not give Bedtime Correction Insulin if BG less than 200.   For  - 224 give 1 units.   For  - 249 give 2 units.   For  - 274 give 3 units.   For  - 299 give 4 units.   For  - 324 give 5 units.   For  - 349 give 6 units.   For BG greater than or equal to 350 give 7 units.   Notify provider if glucose greater than or equal to 350 mg/dL after administration of correction dose. If given at mealtime, administer within 30 minutes of start of meal 8/31/2020 at Unknown time Yes Damari Ohara APRN CNP   insulin glargine (LANTUS PEN) 100 UNIT/ML pen Inject 32 Units Subcutaneous every evening Inject 32 units subcutaneous daily. 8/31/2020 at Unknown time Yes Jeannette Schafer PA-C   magnesium oxide (MAG-OX) 400 (241.3  Mg) MG tablet Take 1 tablet (400 mg) by mouth 2 times daily 8/31/2020 at Unknown time Yes Marcy Harper APRN CNP   mycophenolate (GENERIC EQUIVALENT) 500 MG tablet Take 2 tablets (1,000 mg) by mouth 2 times daily 8/31/2020 at Unknown time Yes Samuel Geller MD   rosuvastatin (CRESTOR) 10 MG tablet TAKE ONE TABLET BY MOUTH ONCE DAILY 8/30/2020 at Unknown time Yes Jenni Ortiz MD   tacrolimus (GENERIC EQUIVALENT) 1 MG capsule Take 3 capsules (3 mg) by mouth 2 times daily Take 3mg BID 8/31/2020 at Unknown time Yes Samuel Geller MD   valGANciclovir (VALCYTE) 450 MG tablet TAKE ONE TABLET (450MG) BY MOUTH DAILY 8/31/2020 at Unknown time Yes Jenni Ortiz MD   ACCU-CHEK [place] PLUS test strip Check BG 3 times daily at meals and at bedtime.   Jenni Ortiz MD   BD SILVANA U/F 32G X 4 MM insulin pen needle Use 3-4 daily.   Jeannette Schafer PA-C   Continuous Blood Gluc  (DEXCOM G6 ) DON 1 each daily   Jeannette Schafer PA-C   Continuous Blood Gluc Sensor (DEXCOM G6 SENSOR) MISC 1 each every 10 days   Jeannette Schafer PA-C      Current Meds    calcium carbonate 600 mg-vitamin D 400 units  1 tablet Oral BID w/meals     hydrALAZINE  25 mg Oral TID     insulin aspart  1-14 Units Subcutaneous TID w/meals     insulin aspart  1-14 Units Subcutaneous BID     insulin aspart   Subcutaneous Daily with lunch     insulin aspart   Subcutaneous QAM AC     insulin aspart   Subcutaneous Daily with supper     insulin isophane human  30 Units Subcutaneous BID     lidocaine  1 patch Transdermal Q24h    And     lidocaine   Transdermal Q8H     multivitamins w/minerals  15 mL Per Feeding Tube Daily     mycophenolate  500 mg Oral BID IS     piperacillin-tazobactam  3.375 g Intravenous Q6H     rosuvastatin  10 mg Oral At Bedtime     sodium chloride (PF)  3 mL Intracatheter Q8H     tacrolimus  0.5 mg Oral BID IS     [START ON 9/17/2020] valGANciclovir  450 mg Oral Every Other  "Day     Infusion Meds    dextrose       - MEDICATION INSTRUCTIONS -       REVIEW OF SYSTEMS:  A comprehensive of systems was negative except as noted above.    SOCIAL HISTORY:   Social History     Socioeconomic History     Marital status: Single     Spouse name: Not on file     Number of children: Not on file     Years of education: Not on file     Highest education level: Not on file   Occupational History     Not on file   Social Needs     Financial resource strain: Not on file     Food insecurity     Worry: Not on file     Inability: Not on file     Transportation needs     Medical: Not on file     Non-medical: Not on file   Tobacco Use     Smoking status: Never Smoker     Smokeless tobacco: Never Used   Substance and Sexual Activity     Alcohol use: No     Frequency: Never     Drug use: No     Sexual activity: Not on file   Lifestyle     Physical activity     Days per week: Not on file     Minutes per session: Not on file     Stress: Not on file   Relationships     Social connections     Talks on phone: Not on file     Gets together: Not on file     Attends Jewish service: Not on file     Active member of club or organization: Not on file     Attends meetings of clubs or organizations: Not on file     Relationship status: Not on file     Intimate partner violence     Fear of current or ex partner: Not on file     Emotionally abused: Not on file     Physically abused: Not on file     Forced sexual activity: Not on file   Other Topics Concern     Parent/sibling w/ CABG, MI or angioplasty before 65F 55M? No   Social History Narrative     Not on file     PHYSICAL EXAM:   Temp  Av.5  F (36.9  C)  Min: 97.1  F (36.2  C)  Max: 101.8  F (38.8  C)      Pulse  Av.9  Min: 89  Max: 133 Resp  Av.5  Min: 10  Max: 84  SpO2  Av.5 %  Min: 86 %  Max: 100 %       BP (!) (P) 135/97 (BP Location: Left arm)   Pulse 100   Temp (P) 97.4  F (36.3  C) (Oral)   Resp (P) 20   Ht 1.626 m (5' 4\")   Wt 79.7 kg " (175 lb 9.6 oz)   SpO2 96%   BMI 30.14 kg/m     Date 09/16/20 0700 - 09/17/20 0659   Shift 4243-8161 5537-2698 5888-2920 24 Hour Total   INTAKE   P.O. 600   600   NG/GT 75   75   Enteral 270   270   Shift Total(mL/kg) 945(11.86)   945(11.86)   OUTPUT   Urine 125   125   Other 100   100   Shift Total(mL/kg) 225(2.82)   225(2.82)   Weight (kg) 79.65 79.65 79.65 79.65      Admit Weight: 82 kg (180 lb 11.2 oz)(scale)     GENERAL APPEARANCE: AOx3  EYES: No scleral icterus, pupils equal  Endo: DMII  Lymphatics: no cervical or supraclavicular LAD  Pulmonary: lungs clear to auscultation with equal breath sounds bilaterally, No clubbing  CV: regular rhythm, normal rate, no rub   - JVD : Not appreciated     - Edema : None  GI: soft, nontender, normal bowel sounds  MS: no evidence of inflammation in joints, no muscle tenderness  : No burning itching  SKIN: no rash, warm, dry, no cyanosis    LABS:   CMP  Recent Labs   Lab 09/16/20  0541 09/15/20  1412 09/15/20  0522 09/14/20  0546 09/13/20  0612 09/12/20  0545   * 132* 131* 129* 127* 128*   POTASSIUM 4.6 4.5 4.0 4.2 4.3 4.1   CHLORIDE 99 102 100 99 95 98   CO2 21 23 21 22 19* 22   ANIONGAP 11 7 10 8 13 8   * 261* 240* 205* 167* 201*   BUN 54* 40* 40* 34* 28 31*   CR 3.68* 3.12* 2.73* 2.01* 1.90* 1.82*   GFRESTIMATED 17* 21* 25* 36* 38* 40*   GFRESTBLACK 20* 24* 28* 41* 44* 46*   ARLENE 8.2* 8.1* 8.3* 8.3* 8.6 8.9   MAG 2.3  --  2.2 1.7 1.6 2.2   PHOS 3.0  --  4.3 3.6  --   --    PROTTOTAL 5.6*  --   --   --   --  6.2*   ALBUMIN 1.7*  --   --   --   --  1.9*   BILITOTAL 0.4  --   --   --   --  0.6   ALKPHOS 116  --   --   --   --  102   *  --   --   --   --  85*   ALT 79*  --   --   --   --  47     CBC  Recent Labs   Lab 09/16/20  0541 09/15/20  1827 09/15/20  0642 09/15/20  0522 09/14/20  0546 09/13/20  0612   HGB 8.5* 8.0* 6.9* 6.7* 7.2* 7.2*   WBC 3.4*  --   --  4.7 8.0 9.0   RBC 3.01*  --   --  2.48* 2.64* 2.70*   HCT 26.4*  --   --  21.9* 22.9* 23.3*    MCV 88  --   --  88 87 86   MCH 28.2  --   --  27.0 27.3 26.7   MCHC 32.2  --   --  30.6* 31.4* 30.9*   RDW 13.7  --   --  13.6 13.6 13.6     --   --  413 426 432     INRNo lab results found in last 7 days.  ABGNo lab results found in last 7 days.   URINE STUDIES  Recent Labs   Lab Test 09/14/20 2020 08/31/20  1615 08/20/20  1630 08/18/20  1404   COLOR Yellow Light Yellow Yellow Yellow   APPEARANCE Slightly Cloudy Clear Slightly Cloudy Slightly Cloudy   URINEGLC 30* 300* 150* Negative   URINEBILI Negative Negative Negative Negative   URINEKETONE Negative 10* Negative Negative   SG 1.017 1.011 1.018 1.018   UBLD Moderate* Small* Small* Large*   URINEPH 5.5 5.5 5.5 5.5   PROTEIN 30* 10* 30* 30*   NITRITE Negative Negative Negative Negative   LEUKEST Negative Negative Small* Moderate*   RBCU 53* 9* 48* >182*   WBCU 4 1 12* 28*     IRON STUDIES  Recent Labs   Lab Test 09/16/20  0541 06/26/19  0925 07/23/18  0645   IRON 30* 58 35   * 370 272   IRONSAT 17 16 13*   JOSE  --  17* 646*     Joel Peterson MD

## 2020-09-16 NOTE — PROGRESS NOTES
Calorie Count  Intake recorded for: 9/15  Total Kcals: 734 Total Protein: 35g  Kcals from Hospital Food: 734  Protein: 35g  Kcals from Outside Food (average):0 Protein: 0g  # Meals Recorded: 2 meals (First - 100% whole milk, 75% tomato soup, 50% grilled cheese, 25% pineapple)      (Second - 75% broth, skim milk, 50% chicken noodle soup w/ saltines, wheat bread w/ butter)  # Supplements Recorded: 75% 1 Boost Breeze

## 2020-09-16 NOTE — PLAN OF CARE
D: Admitted 8/31 with syncope, SOB, cough, and fever. Found to have mass on RUL.   Hx: HTN, CAD, CKD3, hx of LV thrombus, PAF, DM2, ICM s/p LVAD HM3 with subsequent heart tx 5/18/20.     I: Monitored vitals and assessed pt status.   Running:   - intermittent abx  - TF at goal rate 45 mL/hr   PRN: compazine x1     A: A0x4, lethargic. VSS on RA/2L Oxy mask for comfort. Occasionally hypertensive. Tele shows SR/ST, rate 90-100s. Afebrile. Pt denied pain. Oliguric. +BM x4. Intermittent nausea - pt given antiemetic and offered sips of water. Productive, congestive cough. TF running with FW flushes 60 mL q4h. BG elevated overnight, sliding scale insulin given. Slept between cares.      P: Continue to monitor Pt status and report changes to Cards 2.     [id number]  Marcy López RN on 9/16/2020 at 6:02 AM

## 2020-09-16 NOTE — PLAN OF CARE
D: progressive SOB, cough, fever, RUL mass; PMH of CAD, LV thrombus, CKD stage III, PAF, DM2, ICM s/p HM III LVAD now s/p OHT on 5/18/2020.  I/A: A&Ox4, lethargic. Afebrile, on room air, BP's 140's/90's- providers aware, increased PO hydralazine. SR/ST on tele. TF via ND increased to 45 mL/hr (goal rate) with 80 mL free water flushes q4h. Given 500 mL fluid bolus over 4 hours via PIV. IV abx as ordered. Encouraged to void with urinal for UO measurement. 3 soft BM's today.  P: Plan for bronchoscopy tomorrow. Pt to be NPO/ TF off @ 0500. Continue to follow POC and contact Cards 2 with updates/changes.

## 2020-09-16 NOTE — PROGRESS NOTES
"Interventional Pulmonary Note    Subjective  Underwent bronchoscopy under moderate conscious sedation yesterday but had difficulty tolerating procedure due to cough. Overall, he feels his breathing has been stable over the last several days but has its ups and downs. Worked with physical therapy this morning and walked the halls. Has been afebrile over the last 48 hours. Still having poor appetite.    4 point ROS performed, jncluding GI, constitutional, CV, and respiratory, and negative except for above.    Objective  BP (!) 135/97 (BP Location: Left arm)   Pulse 100   Temp 97.4  F (36.3  C) (Oral)   Resp 20   Ht 1.626 m (5' 4\")   Wt 79.7 kg (175 lb 9.6 oz)   SpO2 96%   BMI 30.14 kg/m   on RA  General: Sitting in bed  HEENT: No scleral icterus, NCAT  Lungs: Breathing comfortably on room air.  Neuro: Answering questions appropriately  Psych: Normal affect    Labs reviewed  WBC 3.4 Hgb 8.5 Plt 404    Cr 3.68 (from 2.73 yesterday)    Assessment and Recommendations:  Mariusz Sharp is a 59 year old with history of ICM s/p heart transplant 5/18/20, pAF, and DMII admitted on 8/31/2020 with syncope, coughing, and fever. Has been feeling short of breath and fatigued for 4 weeks.     CT Chest shows new RUL consolidation that is new from CT Chest performed on 7/29/2020. Bronchoscopy with BAL of RUL, endobronchial biopsy, and EBUS-TBNA of station 11R performed 9/8/20 with rare pseudohyphae on cytology, but no organism identified on cultures.  TBNA and biopsy were negative for malignancy. Interval CT Chest performed 9/13/20 shows increased size of RUL consolidation despite antibiotic/antifungal treatment. Did not tolerate repeat bronchoscopy under moderate conscious sedation well 9/15, limiting sample collection.     RUL consolidation with fever and SOB  Hx of heart transplant       Plan for bronchoscopy tomorrow in the OR under general anesthesia with plans for EBUS-TBNA and endobronchial biopsies. Case is scheduled for " 3:45PM, please make NPO and hold tube feeds 8 hours prior.      Patient seen and discussed with Dr. Quezada.     Sasha Nolan  Interventional Pulmonary Fellow  [phone]

## 2020-09-16 NOTE — PLAN OF CARE
OT 6C  Discharge Planner OT   Patient plan for discharge: Home  Current status: . SBA supine <> EOB. SBA sit<>stand x 3 reps throughout session only one repetition with FWW. CGA for ambulating ~15ft x 2 within room EOB <> bathroom with no AD. SBA toilet transfer. Dependent for prema cares. Pt completed g/h task while in standing posture at sink for ~5 minutes with SBA. Pt ambulated ~20ft x 2 with FWW and CGA before needing to stop therapy d/t SOB and fatigue.  Barriers to return to prior living situation: deconditioning, medical status, fatigue, SOB.  Recommendations for discharge: After collaboration with the OT, the recommended dc location is now TCU, however may progress to home pending increased activity tolerance  Rationale for recommendations: Pt is below baseline and would benefit from continued skilled therapy to increase activity tolerance and independence with ADLs.        Entered by: Erna Geller 09/16/2020 9:06 AM

## 2020-09-16 NOTE — PROVIDER NOTIFICATION
Pt triggered sepsis protocol- Lactic 1.6. Cards 2 NP notified. No new orders at this time. Will continue to follow POC and update team with changes.

## 2020-09-16 NOTE — PROGRESS NOTES
"IP Diabetes Management  Daily Note           Assessment and Plan:   HPI: Mariusz Sharp \"Red\" is a 59 year old male with history of CAD, LV thrombus, CKD Stage III, PAF, DM Type II, and ICM s/p HM III LVAD as BTT (2018) with subsequent OHT 5/18/20, presenting with progressive SOB, cough, and fever, found to have right hilar lung mass with infiltrate. S/P bronch with biopsy 9/15. Poor PO intake, and starting enteral feeds.    Assessment:   1)Type II Diabetes Mellitus,  2) Enteral feed-induced hyperglycemia     Plan:    -stopped Lantus (using NPH to better titrate with advancing TF)   -increase NPH to 30 units BID today (covering TF now at goal)   -aspart CHO coverage with meals and snacks/supplements: 1:8g with breakfast, increase to 1:6g with lunch,  dinner and snacks   -increase aspart from high intensity to custom 1/20 scale, AC/HS/and 0200 on continuous TF   -BG monitoring TID AC, HS, 0200   -hypoglycemia protocol   -diet with carb counting protocol   -diabetes education needs to be reassessed prior to discharge.     Outpatient follow up: TBD  Plan discussed with patient.       Interval History and Assessment: interval glucose trend reviewed:   BG rising as continuous tube feeds have now reached goal rate (45cc/hour). Not improved with NPH increase from 15>20 last evening.     Red has a notable severe dry, hacking cough this morning. He states this is not new. Not productive.     He does not like the Boost GC shakes and has not been drinking them (ordered between meals)-he prefers Boost Breeze. Had a snack overnight 0200 that was covered with insulin.     Rudy count 734 yesterday. Needs consistent intake of 1100 cals to discontinue tube feeds.     Current nutritional intake and type: Orders Placed This Encounter      Regular Diet Adult  continuous tube feeds: Nutren 1.5 (goal 45cc/hour, for 190g CHO daily)  Starting 9/15 following procedure, 15cc/hour and advancing by 10 q8h.     PTA Diabetes Regimen:   CGM: " Dexcom G6.  Lantus 32 units daily HS  Novolog 1:8g CHO and high intensity sliding scale AC/HS.    Discharge Planning: TBD           Diabetes History:   Type of Diabetes: Type 2 Diabetes Mellitus, TPN/Enteral Feeding Induced Hyperglycemia  Lab Results   Component Value Date    A1C 7.4 08/19/2020    A1C 7.2 08/13/2020    A1C 7.8 06/15/2020    A1C 8.0 05/12/2020    A1C 9.5 07/05/2019              Review of Systems:   The Review of Systems is negative other than noted in the Interval History.           Medications:     Current Facility-Administered Medications   Medication     acetaminophen (TYLENOL) tablet 975 mg     albuterol (PROVENTIL) neb solution 2.5 mg     benzocaine-menthol (CEPACOL) 15-3.6 MG lozenge 1 lozenge     calcium carbonate 600 mg-vitamin D 400 units (CALTRATE) per tablet 1 tablet     dextrose 10% infusion     glucose gel 15-30 g    Or     dextrose 50 % injection 25-50 mL    Or     glucagon injection 1 mg     hydrALAZINE (APRESOLINE) tablet 25 mg     insulin aspart (NovoLOG) injection (RAPID ACTING)     insulin aspart (NovoLOG) injection (RAPID ACTING)     insulin aspart (NovoLOG) injection (RAPID ACTING)     insulin aspart (NovoLOG) injection (RAPID ACTING)     insulin aspart (NovoLOG) injection (RAPID ACTING)     insulin aspart (NovoLOG) injection (RAPID ACTING)     insulin isophane human (HumuLIN N PEN) injection 30 Units     Lidocaine (LIDOCARE) 4 % Patch 1 patch    And     lidocaine patch in PLACE     lidocaine (LMX4) cream     lidocaine 1 % 0.1-1 mL     loperamide (IMODIUM) capsule 2 mg     magnesium sulfate 2 g in water intermittent infusion     magnesium sulfate 4 g in 100 mL sterile water (premade)     multivitamins w/minerals (CERTAVITE) liquid 15 mL     mycophenolate (GENERIC EQUIVALENT) tablet 500 mg     naloxone (NARCAN) injection 0.1-0.4 mg     ondansetron (ZOFRAN) tablet 4 mg     oxyCODONE (ROXICODONE) tablet 5 mg     Patient is already receiving mechanical prophylaxis      "piperacillin-tazobactam (ZOSYN) 3.375 g vial to attach to  mL bag     potassium chloride (KLOR-CON) Packet 20-40 mEq     potassium chloride 10 mEq in 100 mL intermittent infusion with 10 mg lidocaine     potassium chloride 10 mEq in 100 mL sterile water intermittent infusion (premix)     potassium chloride 20 mEq in 50 mL intermittent infusion     potassium chloride ER (KLOR-CON M) CR tablet 20-40 mEq     prochlorperazine (COMPAZINE) injection 5 mg     rosuvastatin (CRESTOR) tablet 10 mg     sodium chloride (NEBUSAL) 3 % neb solution 3 mL     sodium chloride (PF) 0.9% PF flush 3 mL     sodium chloride (PF) 0.9% PF flush 3 mL     tacrolimus (GENERIC EQUIVALENT) capsule 0.5 mg     [START ON 9/17/2020] valGANciclovir (VALCYTE) tablet 450 mg            Physical Exam:    BP (!) 133/94 (BP Location: Left arm, Cuff Size: Adult Regular)   Pulse 104   Temp 97.4  F (36.3  C) (Oral)   Resp 18   Ht 1.626 m (5' 4\")   Wt 79.7 kg (175 lb 9.6 oz)   SpO2 93%   BMI 30.14 kg/m    General: pleasant, in mild distress due to persistent hacking cough.  HEENT: normocephalic, atraumatic. Oral mucous membranes moist.   Lungs: unlabored respiration, + dry hacking cough  ABD: rounded, soft  Skin: warm and dry, no obvious lesions  MSK:  moves all extremities  Lymp:  no LE edema   Mental status:  alert, oriented to self, place, time  Psych:  affect, calm and appropriate interaction          Data:     Recent Labs   Lab 09/16/20  0858 09/16/20  0541 09/16/20  0215 09/15/20  2117 09/15/20  1534 09/15/20  1412 09/15/20  1217 09/15/20  1008  09/15/20  0522  09/14/20  0546  09/13/20  0612  09/12/20  0545   GLC  --  274*  --   --   --  261*  --   --   --  240*  --  205*  --  167*  --  201*   *  --  258* 254* 254*  --  230* 225*   < >  --    < >  --    < >  --    < >  --     < > = values in this interval not displayed.     Lab Results   Component Value Date    WBC 3.4 (L) 09/16/2020    WBC 4.7 09/15/2020    WBC 8.0 09/14/2020    HGB " 8.5 (L) 09/16/2020    HGB 8.0 (L) 09/15/2020    HGB 6.9 (LL) 09/15/2020    HCT 26.4 (L) 09/16/2020    HCT 21.9 (L) 09/15/2020    HCT 22.9 (L) 09/14/2020    MCV 88 09/16/2020    MCV 88 09/15/2020    MCV 87 09/14/2020     09/16/2020     09/15/2020     09/14/2020     Lab Results   Component Value Date     (L) 09/16/2020     (L) 09/15/2020     (L) 09/15/2020    POTASSIUM 4.6 09/16/2020    POTASSIUM 4.5 09/15/2020    POTASSIUM 4.0 09/15/2020    CHLORIDE 99 09/16/2020    CHLORIDE 102 09/15/2020    CHLORIDE 100 09/15/2020    CO2 21 09/16/2020    CO2 23 09/15/2020    CO2 21 09/15/2020     (H) 09/16/2020     (H) 09/15/2020     (H) 09/15/2020     Lab Results   Component Value Date    BUN 54 (H) 09/16/2020    BUN 40 (H) 09/15/2020    BUN 40 (H) 09/15/2020     Lab Results   Component Value Date    TSH 3.94 06/26/2019    TSH 6.91 (H) 07/23/2018     Lab Results   Component Value Date     (H) 09/16/2020    AST 85 (H) 09/12/2020    AST 22 09/05/2020    ALT 79 (H) 09/16/2020    ALT 47 09/12/2020    ALT 15 09/05/2020    ALKPHOS 116 09/16/2020    ALKPHOS 102 09/12/2020    ALKPHOS 68 09/05/2020       Diabetes Management Team job code: [id number]  I spent a total of 35 minutes bedside and on the inpatient unit managing glycemic care. Over 50% of my time on the unit was spent counseling the patient and/or coordinating care regarding acute hyperglycemia management.  See note for details.    Juana Ly PA-C  Inpatient Diabetes Management Service  Pager [phone]

## 2020-09-17 ENCOUNTER — APPOINTMENT (OUTPATIENT)
Dept: GENERAL RADIOLOGY | Facility: CLINIC | Age: 60
End: 2020-09-17
Attending: STUDENT IN AN ORGANIZED HEALTH CARE EDUCATION/TRAINING PROGRAM
Payer: COMMERCIAL

## 2020-09-17 ENCOUNTER — APPOINTMENT (OUTPATIENT)
Dept: PHYSICAL THERAPY | Facility: CLINIC | Age: 60
End: 2020-09-17
Payer: COMMERCIAL

## 2020-09-17 ENCOUNTER — ANESTHESIA (OUTPATIENT)
Dept: SURGERY | Facility: CLINIC | Age: 60
End: 2020-09-17
Payer: COMMERCIAL

## 2020-09-17 ENCOUNTER — ANESTHESIA EVENT (OUTPATIENT)
Dept: SURGERY | Facility: CLINIC | Age: 60
End: 2020-09-17
Payer: COMMERCIAL

## 2020-09-17 LAB
ACID FAST STN SPEC QL: NORMAL
ACID FAST STN SPEC QL: NORMAL
ALBUMIN SERPL-MCNC: 1.5 G/DL (ref 3.4–5)
ALP SERPL-CCNC: 114 U/L (ref 40–150)
ALT SERPL W P-5'-P-CCNC: 73 U/L (ref 0–70)
ANION GAP SERPL CALCULATED.3IONS-SCNC: 12 MMOL/L (ref 3–14)
ANION GAP SERPL CALCULATED.3IONS-SCNC: 7 MMOL/L (ref 3–14)
AST SERPL W P-5'-P-CCNC: 87 U/L (ref 0–45)
BACTERIA SPEC CULT: NO GROWTH
BILIRUB DIRECT SERPL-MCNC: 0.2 MG/DL (ref 0–0.2)
BILIRUB SERPL-MCNC: 0.4 MG/DL (ref 0.2–1.3)
BUN SERPL-MCNC: 54 MG/DL (ref 7–30)
BUN SERPL-MCNC: 59 MG/DL (ref 7–30)
C DIFF TOX B STL QL: NEGATIVE
CALCIUM SERPL-MCNC: 8 MG/DL (ref 8.5–10.1)
CALCIUM SERPL-MCNC: 8.4 MG/DL (ref 8.5–10.1)
CHLORIDE SERPL-SCNC: 103 MMOL/L (ref 94–109)
CHLORIDE SERPL-SCNC: 106 MMOL/L (ref 94–109)
CO2 SERPL-SCNC: 19 MMOL/L (ref 20–32)
CO2 SERPL-SCNC: 21 MMOL/L (ref 20–32)
CREAT SERPL-MCNC: 3.35 MG/DL (ref 0.66–1.25)
CREAT SERPL-MCNC: 3.75 MG/DL (ref 0.66–1.25)
ERYTHROCYTE [DISTWIDTH] IN BLOOD BY AUTOMATED COUNT: 13.6 % (ref 10–15)
GFR SERPL CREATININE-BSD FRML MDRD: 17 ML/MIN/{1.73_M2}
GFR SERPL CREATININE-BSD FRML MDRD: 19 ML/MIN/{1.73_M2}
GLUCOSE BLDC GLUCOMTR-MCNC: 168 MG/DL (ref 70–99)
GLUCOSE BLDC GLUCOMTR-MCNC: 179 MG/DL (ref 70–99)
GLUCOSE BLDC GLUCOMTR-MCNC: 190 MG/DL (ref 70–99)
GLUCOSE BLDC GLUCOMTR-MCNC: 191 MG/DL (ref 70–99)
GLUCOSE BLDC GLUCOMTR-MCNC: 233 MG/DL (ref 70–99)
GLUCOSE BLDC GLUCOMTR-MCNC: 249 MG/DL (ref 70–99)
GLUCOSE BLDC GLUCOMTR-MCNC: 276 MG/DL (ref 70–99)
GLUCOSE BLDC GLUCOMTR-MCNC: 318 MG/DL (ref 70–99)
GLUCOSE BLDC GLUCOMTR-MCNC: 320 MG/DL (ref 70–99)
GLUCOSE BLDC GLUCOMTR-MCNC: 324 MG/DL (ref 70–99)
GLUCOSE BLDC GLUCOMTR-MCNC: 328 MG/DL (ref 70–99)
GLUCOSE BLDC GLUCOMTR-MCNC: 333 MG/DL (ref 70–99)
GLUCOSE BLDC GLUCOMTR-MCNC: 341 MG/DL (ref 70–99)
GLUCOSE SERPL-MCNC: 289 MG/DL (ref 70–99)
GLUCOSE SERPL-MCNC: 301 MG/DL (ref 70–99)
GRAM STN SPEC: NORMAL
HCT VFR BLD AUTO: 25.9 % (ref 40–53)
HGB BLD-MCNC: 7.9 G/DL (ref 13.3–17.7)
INTERPRETATION ECG - MUSE: NORMAL
KOH PREP SPEC: NORMAL
LACTATE BLD-SCNC: 1.3 MMOL/L (ref 0.7–2)
MAGNESIUM SERPL-MCNC: 1.9 MG/DL (ref 1.6–2.3)
MCH RBC QN AUTO: 27.1 PG (ref 26.5–33)
MCHC RBC AUTO-ENTMCNC: 30.5 G/DL (ref 31.5–36.5)
MCV RBC AUTO: 89 FL (ref 78–100)
PHOSPHATE SERPL-MCNC: 2 MG/DL (ref 2.5–4.5)
PLATELET # BLD AUTO: 380 10E9/L (ref 150–450)
POTASSIUM SERPL-SCNC: 4.6 MMOL/L (ref 3.4–5.3)
POTASSIUM SERPL-SCNC: 4.8 MMOL/L (ref 3.4–5.3)
PROT SERPL-MCNC: 5.6 G/DL (ref 6.8–8.8)
RBC # BLD AUTO: 2.91 10E12/L (ref 4.4–5.9)
SODIUM SERPL-SCNC: 133 MMOL/L (ref 133–144)
SODIUM SERPL-SCNC: 134 MMOL/L (ref 133–144)
SPECIMEN SOURCE: NORMAL
TACROLIMUS BLD-MCNC: 5.3 UG/L (ref 5–15)
TME LAST DOSE: NORMAL H
TROPONIN I SERPL-MCNC: <0.015 UG/L (ref 0–0.04)
TROPONIN I SERPL-MCNC: <0.015 UG/L (ref 0–0.04)
WBC # BLD AUTO: 3.3 10E9/L (ref 4–11)

## 2020-09-17 PROCEDURE — 88305 TISSUE EXAM BY PATHOLOGIST: CPT | Performed by: INTERNAL MEDICINE

## 2020-09-17 PROCEDURE — 0BD48ZX EXTRACTION OF RIGHT UPPER LOBE BRONCHUS, VIA NATURAL OR ARTIFICIAL OPENING ENDOSCOPIC, DIAGNOSTIC: ICD-10-PCS | Performed by: INTERNAL MEDICINE

## 2020-09-17 PROCEDURE — 40000986 XR CHEST PORT 1 VW

## 2020-09-17 PROCEDURE — 80048 BASIC METABOLIC PNL TOTAL CA: CPT | Performed by: PHYSICIAN ASSISTANT

## 2020-09-17 PROCEDURE — 25800030 ZZH RX IP 258 OP 636: Performed by: NURSE ANESTHETIST, CERTIFIED REGISTERED

## 2020-09-17 PROCEDURE — 87206 SMEAR FLUORESCENT/ACID STAI: CPT | Performed by: INTERNAL MEDICINE

## 2020-09-17 PROCEDURE — 25000132 ZZH RX MED GY IP 250 OP 250 PS 637: Performed by: PHYSICIAN ASSISTANT

## 2020-09-17 PROCEDURE — 87493 C DIFF AMPLIFIED PROBE: CPT | Performed by: PHYSICIAN ASSISTANT

## 2020-09-17 PROCEDURE — 27210794 ZZH OR GENERAL SUPPLY STERILE: Performed by: INTERNAL MEDICINE

## 2020-09-17 PROCEDURE — 00000146 ZZHCL STATISTIC GLUCOSE BY METER IP

## 2020-09-17 PROCEDURE — 0B918ZZ DRAINAGE OF TRACHEA, VIA NATURAL OR ARTIFICIAL OPENING ENDOSCOPIC: ICD-10-PCS | Performed by: INTERNAL MEDICINE

## 2020-09-17 PROCEDURE — 80076 HEPATIC FUNCTION PANEL: CPT | Performed by: NURSE PRACTITIONER

## 2020-09-17 PROCEDURE — 80048 BASIC METABOLIC PNL TOTAL CA: CPT | Performed by: NURSE PRACTITIONER

## 2020-09-17 PROCEDURE — 25000128 H RX IP 250 OP 636: Performed by: NURSE ANESTHETIST, CERTIFIED REGISTERED

## 2020-09-17 PROCEDURE — 36000066 ZZH SURGERY LEVEL 4 W FLUORO 1ST 30 MIN - UMMC: Performed by: INTERNAL MEDICINE

## 2020-09-17 PROCEDURE — 27210429 ZZH NUTRITION PRODUCT INTERMEDIATE LITER

## 2020-09-17 PROCEDURE — 87070 CULTURE OTHR SPECIMN AEROBIC: CPT | Performed by: INTERNAL MEDICINE

## 2020-09-17 PROCEDURE — 36415 COLL VENOUS BLD VENIPUNCTURE: CPT | Performed by: PHYSICIAN ASSISTANT

## 2020-09-17 PROCEDURE — 40001005 ZZHCL STATISTIC FLOW >15 ABY TC 88189: Performed by: INTERNAL MEDICINE

## 2020-09-17 PROCEDURE — 25000128 H RX IP 250 OP 636: Performed by: PHYSICIAN ASSISTANT

## 2020-09-17 PROCEDURE — 88185 FLOWCYTOMETRY/TC ADD-ON: CPT | Performed by: INTERNAL MEDICINE

## 2020-09-17 PROCEDURE — 84100 ASSAY OF PHOSPHORUS: CPT | Performed by: NURSE PRACTITIONER

## 2020-09-17 PROCEDURE — 84484 ASSAY OF TROPONIN QUANT: CPT | Performed by: NURSE PRACTITIONER

## 2020-09-17 PROCEDURE — 36415 COLL VENOUS BLD VENIPUNCTURE: CPT | Performed by: INTERNAL MEDICINE

## 2020-09-17 PROCEDURE — 87081 CULTURE SCREEN ONLY: CPT | Performed by: INTERNAL MEDICINE

## 2020-09-17 PROCEDURE — 88108 CYTOPATH CONCENTRATE TECH: CPT | Performed by: INTERNAL MEDICINE

## 2020-09-17 PROCEDURE — 25000131 ZZH RX MED GY IP 250 OP 636 PS 637: Performed by: NURSE PRACTITIONER

## 2020-09-17 PROCEDURE — 25000132 ZZH RX MED GY IP 250 OP 250 PS 637: Performed by: NURSE PRACTITIONER

## 2020-09-17 PROCEDURE — 25000132 ZZH RX MED GY IP 250 OP 250 PS 637: Performed by: STUDENT IN AN ORGANIZED HEALTH CARE EDUCATION/TRAINING PROGRAM

## 2020-09-17 PROCEDURE — 93005 ELECTROCARDIOGRAM TRACING: CPT

## 2020-09-17 PROCEDURE — 88184 FLOWCYTOMETRY/ TC 1 MARKER: CPT | Performed by: INTERNAL MEDICINE

## 2020-09-17 PROCEDURE — 97116 GAIT TRAINING THERAPY: CPT | Mod: GP

## 2020-09-17 PROCEDURE — 25000131 ZZH RX MED GY IP 250 OP 636 PS 637: Performed by: PHYSICIAN ASSISTANT

## 2020-09-17 PROCEDURE — 25000128 H RX IP 250 OP 636: Performed by: INTERNAL MEDICINE

## 2020-09-17 PROCEDURE — 25800030 ZZH RX IP 258 OP 636: Performed by: PHYSICIAN ASSISTANT

## 2020-09-17 PROCEDURE — 40000170 ZZH STATISTIC PRE-PROCEDURE ASSESSMENT II: Performed by: INTERNAL MEDICINE

## 2020-09-17 PROCEDURE — 85027 COMPLETE CBC AUTOMATED: CPT | Performed by: NURSE PRACTITIONER

## 2020-09-17 PROCEDURE — 87102 FUNGUS ISOLATION CULTURE: CPT | Performed by: INTERNAL MEDICINE

## 2020-09-17 PROCEDURE — 83605 ASSAY OF LACTIC ACID: CPT | Performed by: INTERNAL MEDICINE

## 2020-09-17 PROCEDURE — 25000132 ZZH RX MED GY IP 250 OP 250 PS 637: Performed by: INTERNAL MEDICINE

## 2020-09-17 PROCEDURE — 25000566 ZZH SEVOFLURANE, EA 15 MIN: Performed by: INTERNAL MEDICINE

## 2020-09-17 PROCEDURE — 88173 CYTOPATH EVAL FNA REPORT: CPT | Performed by: INTERNAL MEDICINE

## 2020-09-17 PROCEDURE — 71000014 ZZH RECOVERY PHASE 1 LEVEL 2 FIRST HR: Performed by: INTERNAL MEDICINE

## 2020-09-17 PROCEDURE — 87116 MYCOBACTERIA CULTURE: CPT | Performed by: INTERNAL MEDICINE

## 2020-09-17 PROCEDURE — 97530 THERAPEUTIC ACTIVITIES: CPT | Mod: GP

## 2020-09-17 PROCEDURE — 87210 SMEAR WET MOUNT SALINE/INK: CPT | Performed by: INTERNAL MEDICINE

## 2020-09-17 PROCEDURE — 36000064 ZZH SURGERY LEVEL 4 EA 15 ADDTL MIN - UMMC: Performed by: INTERNAL MEDICINE

## 2020-09-17 PROCEDURE — 87205 SMEAR GRAM STAIN: CPT | Performed by: INTERNAL MEDICINE

## 2020-09-17 PROCEDURE — 21400000 ZZH R&B CCU UMMC

## 2020-09-17 PROCEDURE — 83735 ASSAY OF MAGNESIUM: CPT | Performed by: NURSE PRACTITIONER

## 2020-09-17 PROCEDURE — 87015 SPECIMEN INFECT AGNT CONCNTJ: CPT | Performed by: INTERNAL MEDICINE

## 2020-09-17 PROCEDURE — 25000125 ZZHC RX 250: Performed by: NURSE ANESTHETIST, CERTIFIED REGISTERED

## 2020-09-17 PROCEDURE — 37000008 ZZH ANESTHESIA TECHNICAL FEE, 1ST 30 MIN: Performed by: INTERNAL MEDICINE

## 2020-09-17 PROCEDURE — 87077 CULTURE AEROBIC IDENTIFY: CPT | Performed by: INTERNAL MEDICINE

## 2020-09-17 PROCEDURE — 25000125 ZZHC RX 250: Performed by: PHYSICIAN ASSISTANT

## 2020-09-17 PROCEDURE — 37000009 ZZH ANESTHESIA TECHNICAL FEE, EACH ADDTL 15 MIN: Performed by: INTERNAL MEDICINE

## 2020-09-17 PROCEDURE — 36415 COLL VENOUS BLD VENIPUNCTURE: CPT | Performed by: NURSE PRACTITIONER

## 2020-09-17 PROCEDURE — 00000155 ZZHCL STATISTIC H-CELL BLOCK W/STAIN: Performed by: INTERNAL MEDICINE

## 2020-09-17 PROCEDURE — 80197 ASSAY OF TACROLIMUS: CPT | Performed by: NURSE PRACTITIONER

## 2020-09-17 RX ORDER — HYDRALAZINE HYDROCHLORIDE 50 MG/1
50 TABLET, FILM COATED ORAL 3 TIMES DAILY
Status: DISCONTINUED | OUTPATIENT
Start: 2020-09-17 | End: 2020-09-20

## 2020-09-17 RX ORDER — HYDRALAZINE HYDROCHLORIDE 20 MG/ML
10 INJECTION INTRAMUSCULAR; INTRAVENOUS EVERY 4 HOURS PRN
Status: DISCONTINUED | OUTPATIENT
Start: 2020-09-17 | End: 2020-09-24 | Stop reason: HOSPADM

## 2020-09-17 RX ORDER — SODIUM CHLORIDE 9 MG/ML
INJECTION, SOLUTION INTRAVENOUS CONTINUOUS
Status: ACTIVE | OUTPATIENT
Start: 2020-09-17 | End: 2020-09-17

## 2020-09-17 RX ORDER — SODIUM CHLORIDE, SODIUM LACTATE, POTASSIUM CHLORIDE, CALCIUM CHLORIDE 600; 310; 30; 20 MG/100ML; MG/100ML; MG/100ML; MG/100ML
INJECTION, SOLUTION INTRAVENOUS CONTINUOUS
Status: DISCONTINUED | OUTPATIENT
Start: 2020-09-17 | End: 2020-09-17 | Stop reason: HOSPADM

## 2020-09-17 RX ORDER — FENTANYL CITRATE 50 UG/ML
25-50 INJECTION, SOLUTION INTRAMUSCULAR; INTRAVENOUS
Status: DISCONTINUED | OUTPATIENT
Start: 2020-09-17 | End: 2020-09-17 | Stop reason: HOSPADM

## 2020-09-17 RX ORDER — DEXAMETHASONE SODIUM PHOSPHATE 4 MG/ML
INJECTION, SOLUTION INTRA-ARTICULAR; INTRALESIONAL; INTRAMUSCULAR; INTRAVENOUS; SOFT TISSUE PRN
Status: DISCONTINUED | OUTPATIENT
Start: 2020-09-17 | End: 2020-09-17

## 2020-09-17 RX ORDER — CEFAZOLIN SODIUM 2 G/100ML
2 INJECTION, SOLUTION INTRAVENOUS
Status: DISCONTINUED | OUTPATIENT
Start: 2020-09-17 | End: 2020-09-17 | Stop reason: HOSPADM

## 2020-09-17 RX ORDER — TACROLIMUS 1 MG/1
2 CAPSULE ORAL
Status: DISCONTINUED | OUTPATIENT
Start: 2020-09-18 | End: 2020-09-18

## 2020-09-17 RX ORDER — LIDOCAINE HYDROCHLORIDE 20 MG/ML
INJECTION, SOLUTION INFILTRATION; PERINEURAL PRN
Status: DISCONTINUED | OUTPATIENT
Start: 2020-09-17 | End: 2020-09-17

## 2020-09-17 RX ORDER — PROPOFOL 10 MG/ML
INJECTION, EMULSION INTRAVENOUS CONTINUOUS PRN
Status: DISCONTINUED | OUTPATIENT
Start: 2020-09-17 | End: 2020-09-17

## 2020-09-17 RX ORDER — PROPOFOL 10 MG/ML
INJECTION, EMULSION INTRAVENOUS PRN
Status: DISCONTINUED | OUTPATIENT
Start: 2020-09-17 | End: 2020-09-17

## 2020-09-17 RX ORDER — NALOXONE HYDROCHLORIDE 0.4 MG/ML
.1-.4 INJECTION, SOLUTION INTRAMUSCULAR; INTRAVENOUS; SUBCUTANEOUS
Status: ACTIVE | OUTPATIENT
Start: 2020-09-17 | End: 2020-09-18

## 2020-09-17 RX ORDER — ONDANSETRON 4 MG/1
4 TABLET, ORALLY DISINTEGRATING ORAL EVERY 30 MIN PRN
Status: DISCONTINUED | OUTPATIENT
Start: 2020-09-17 | End: 2020-09-17 | Stop reason: HOSPADM

## 2020-09-17 RX ORDER — CEFAZOLIN SODIUM 1 G/3ML
1 INJECTION, POWDER, FOR SOLUTION INTRAMUSCULAR; INTRAVENOUS SEE ADMIN INSTRUCTIONS
Status: DISCONTINUED | OUTPATIENT
Start: 2020-09-17 | End: 2020-09-17 | Stop reason: HOSPADM

## 2020-09-17 RX ORDER — ONDANSETRON 2 MG/ML
INJECTION INTRAMUSCULAR; INTRAVENOUS PRN
Status: DISCONTINUED | OUTPATIENT
Start: 2020-09-17 | End: 2020-09-17

## 2020-09-17 RX ORDER — ONDANSETRON 2 MG/ML
4 INJECTION INTRAMUSCULAR; INTRAVENOUS EVERY 30 MIN PRN
Status: DISCONTINUED | OUTPATIENT
Start: 2020-09-17 | End: 2020-09-17 | Stop reason: HOSPADM

## 2020-09-17 RX ORDER — FENTANYL CITRATE 50 UG/ML
INJECTION, SOLUTION INTRAMUSCULAR; INTRAVENOUS PRN
Status: DISCONTINUED | OUTPATIENT
Start: 2020-09-17 | End: 2020-09-17

## 2020-09-17 RX ADMIN — HYDRALAZINE HYDROCHLORIDE 10 MG: 20 INJECTION INTRAMUSCULAR; INTRAVENOUS at 13:45

## 2020-09-17 RX ADMIN — PIPERACILLIN SODIUM AND TAZOBACTAM SODIUM 2.25 G: 2; .25 INJECTION, POWDER, LYOPHILIZED, FOR SOLUTION INTRAVENOUS at 14:18

## 2020-09-17 RX ADMIN — PHENYLEPHRINE HYDROCHLORIDE 100 MCG: 10 INJECTION INTRAVENOUS at 14:30

## 2020-09-17 RX ADMIN — MIDAZOLAM 2 MG: 1 INJECTION INTRAMUSCULAR; INTRAVENOUS at 14:08

## 2020-09-17 RX ADMIN — HYDRALAZINE HYDROCHLORIDE 50 MG: 50 TABLET, FILM COATED ORAL at 19:16

## 2020-09-17 RX ADMIN — HUMAN INSULIN 18 UNITS/HR: 100 INJECTION, SOLUTION SUBCUTANEOUS at 22:59

## 2020-09-17 RX ADMIN — Medication 1 TABLET: at 18:17

## 2020-09-17 RX ADMIN — FENTANYL CITRATE 100 MCG: 50 INJECTION, SOLUTION INTRAMUSCULAR; INTRAVENOUS at 14:08

## 2020-09-17 RX ADMIN — ONDANSETRON 4 MG: 2 INJECTION INTRAMUSCULAR; INTRAVENOUS at 14:18

## 2020-09-17 RX ADMIN — ROSUVASTATIN CALCIUM 10 MG: 10 TABLET, FILM COATED ORAL at 21:05

## 2020-09-17 RX ADMIN — MULTIVITAMIN 15 ML: LIQUID ORAL at 08:12

## 2020-09-17 RX ADMIN — HYDRALAZINE HYDROCHLORIDE 50 MG: 50 TABLET, FILM COATED ORAL at 07:58

## 2020-09-17 RX ADMIN — SUGAMMADEX 200 MG: 100 INJECTION, SOLUTION INTRAVENOUS at 14:45

## 2020-09-17 RX ADMIN — ROCURONIUM BROMIDE 40 MG: 10 INJECTION INTRAVENOUS at 14:10

## 2020-09-17 RX ADMIN — MYCOPHENOLATE MOFETIL 500 MG: 500 TABLET ORAL at 08:11

## 2020-09-17 RX ADMIN — TACROLIMUS 1 MG: 1 CAPSULE ORAL at 08:11

## 2020-09-17 RX ADMIN — PROPOFOL 100 MG: 10 INJECTION, EMULSION INTRAVENOUS at 14:10

## 2020-09-17 RX ADMIN — PROPOFOL 150 MCG/KG/MIN: 10 INJECTION, EMULSION INTRAVENOUS at 14:14

## 2020-09-17 RX ADMIN — INSULIN ASPART 5 UNITS: 100 INJECTION, SOLUTION INTRAVENOUS; SUBCUTANEOUS at 00:11

## 2020-09-17 RX ADMIN — PHENYLEPHRINE HYDROCHLORIDE 200 MCG: 10 INJECTION INTRAVENOUS at 14:51

## 2020-09-17 RX ADMIN — VALGANCICLOVIR 450 MG: 450 TABLET, FILM COATED ORAL at 08:12

## 2020-09-17 RX ADMIN — PIPERACILLIN SODIUM AND TAZOBACTAM SODIUM 2.25 G: 2; .25 INJECTION, POWDER, LYOPHILIZED, FOR SOLUTION INTRAVENOUS at 02:20

## 2020-09-17 RX ADMIN — TACROLIMUS 2.5 MG: 1 CAPSULE ORAL at 18:17

## 2020-09-17 RX ADMIN — PHENYLEPHRINE HYDROCHLORIDE 100 MCG: 10 INJECTION INTRAVENOUS at 14:27

## 2020-09-17 RX ADMIN — PIPERACILLIN SODIUM AND TAZOBACTAM SODIUM 2.25 G: 2; .25 INJECTION, POWDER, LYOPHILIZED, FOR SOLUTION INTRAVENOUS at 08:12

## 2020-09-17 RX ADMIN — MYCOPHENOLATE MOFETIL 500 MG: 500 TABLET ORAL at 18:17

## 2020-09-17 RX ADMIN — SODIUM CHLORIDE: 9 INJECTION, SOLUTION INTRAVENOUS at 15:47

## 2020-09-17 RX ADMIN — SODIUM CHLORIDE: 9 INJECTION, SOLUTION INTRAVENOUS at 13:45

## 2020-09-17 RX ADMIN — LOPERAMIDE HYDROCHLORIDE 2 MG: 2 CAPSULE ORAL at 00:58

## 2020-09-17 RX ADMIN — PHENYLEPHRINE HYDROCHLORIDE 200 MCG: 10 INJECTION INTRAVENOUS at 14:33

## 2020-09-17 RX ADMIN — HUMAN INSULIN 1.5 UNITS/HR: 100 INJECTION, SOLUTION SUBCUTANEOUS at 17:09

## 2020-09-17 RX ADMIN — DEXAMETHASONE SODIUM PHOSPHATE 6 MG: 4 INJECTION, SOLUTION INTRA-ARTICULAR; INTRALESIONAL; INTRAMUSCULAR; INTRAVENOUS; SOFT TISSUE at 14:18

## 2020-09-17 RX ADMIN — Medication 1 TABLET: at 08:12

## 2020-09-17 RX ADMIN — PIPERACILLIN SODIUM AND TAZOBACTAM SODIUM 2.25 G: 2; .25 INJECTION, POWDER, LYOPHILIZED, FOR SOLUTION INTRAVENOUS at 19:16

## 2020-09-17 RX ADMIN — LIDOCAINE HYDROCHLORIDE 100 MG: 20 INJECTION, SOLUTION INFILTRATION; PERINEURAL at 14:10

## 2020-09-17 RX ADMIN — PHENYLEPHRINE HYDROCHLORIDE 100 MCG: 10 INJECTION INTRAVENOUS at 14:45

## 2020-09-17 RX ADMIN — PHENYLEPHRINE HYDROCHLORIDE 100 MCG: 10 INJECTION INTRAVENOUS at 14:24

## 2020-09-17 RX ADMIN — PHENYLEPHRINE HYDROCHLORIDE 200 MCG: 10 INJECTION INTRAVENOUS at 14:38

## 2020-09-17 RX ADMIN — ACETAMINOPHEN 975 MG: 325 TABLET, FILM COATED ORAL at 17:07

## 2020-09-17 ASSESSMENT — ACTIVITIES OF DAILY LIVING (ADL)
ADLS_ACUITY_SCORE: 16
ADLS_ACUITY_SCORE: 15
ADLS_ACUITY_SCORE: 16
ADLS_ACUITY_SCORE: 16
ADLS_ACUITY_SCORE: 15

## 2020-09-17 ASSESSMENT — PAIN DESCRIPTION - DESCRIPTORS
DESCRIPTORS: HEADACHE
DESCRIPTORS: DISCOMFORT;TENDER

## 2020-09-17 ASSESSMENT — MIFFLIN-ST. JEOR
SCORE: 1577.42
SCORE: 1571.07

## 2020-09-17 NOTE — PROGRESS NOTES
[facility]   Cardiology II Service / Advanced Heart Failure  Daily Progress Note      Patient: Mariusz Sharp  MRN: [id number]  Admission Date: 8/31/2020  Hospital Day # 17    Assessment and Plan: Mariusz Sharp is a 59 year old male with PMH of CAD, LV thrombus, CKD Stage III, PAF, DM Type II, and ICM s/p HM III LVAD as BTT with subsequent OHT 5/18/20. He presents with progressive SOB, cough, and fever.      Plan today:  - NPO this morning, tube feeds held as of 5 am, endocrine aware  - Planning for OR time around 3:30 pm for repeat bronchoscopy in the OR: Bronchoscopy and transbronchial biopsy for for fungal / AFB / aerobic / anaerobic / Nocardia / Actinomyces cultures as well as histopathology including Lori and GMS stains (and perhaps FACS analysis) .  - Increase hydralazine to 50 mg q8h  - Will start IV fluid for mateinance   - Trend LFTs daily  - Continue holding aspirin   - Continue current antibtioic: Zosyn renally dosed  - Off cefdinir, off posaconazole    Fever with Cough secondary to RUL mass complicated by Postobstructive Pneumoania. Tmax 102 prior to admission. History of rhinovirus. CDIF, LA, UA, COVID, Legionella, Cryptococcus, Strep pneumo negative, Aspergillus, RSV, CMV, and EBV negative. Renal US notes resolution of prior stone. History of PAcnes to outflow graft and Donor positive S Anginosus and Veillonella (completed course of antibiotics). Concern for Chronic Prostatitis. Vanco/Zosyn discontinued 9/1. CT Chest positive for RUL mass concerning for PTLD vs infection. Beta D glucan 90 and trending upward. Appreciate IR Pulm consult. S/p EBUS with BAL 9/8 with negative cytology and PJP. LDH-380. Absolute CD4-359. NonContrast Chest CT 9/13 consistent with increased RUL mass with progressive post obstructive PNA.  - Appreciate Transplant ID Consult.   - Repeat Bronch with EBUS 9/17- no visualization of the endobronchial mass but tissue obtained from the area of the RUQ mass  - Per  Pulm, recommend awaiting growth from 9/17 biopsies before referring to thoracics as this is going to be a difficult site for them to access.  - Tissue for histopathology, AFB, bacterial, and fungal NTD from 9/8 EBUS.   - IGM-29, IGG-510, IGA-108, CMV negative, annd UA negative. IGE, viral urine culture, and UC pending NGTD  - Blood cultures NGTD, repeated 9/9.  - Posaconazole discontinued 9/15  - Atovaquone discontinued.    - Cefdinir transitioned to IV Zosyn.   - NS and Albuterol nebs for clearance.      Diarrhea, Nausea, and Vomiting. CDIF negative and CMV negative. Repeat stool cultures negative.    - Compazine prn nausea.      S/p OHT secondary to ICM. 5/18/20. Echo 8/31 with EF 70% and normal graft function.   RHC 8/26 with mRA-3, RV-20/2, mPA-15, mPCWP-10, AMRIT CO-4.91, AMRIT CI-2.63, biopsy negative. Immuknow 33 9/3 with reduction in Cellcept above.   Immunosuppression: Simulect 5/18 and 5/22. Tac 0.5 mg po BID, Tac level today pending. Goal-8-10. Cellcept 500 mg po BID (decreased d/t 9/3 Immuknow and infection), and Prednisone completed 8/27/20.   Serostatus: CMV: D+, R-, EBV: D- R+, Toxo D- R-  Prophylaxis: Valcyte. Atovaquone discontinued per ID as above.   - Continue Crestor.   - ASA discontinued in setting of hematuria.   - Next EMB due 9/30, recheck Immuknow at this time as well.      HTN. Prior to admission Norvasc on  Hold.   - Increase hydralazine 50 TID, IV PRN while NPO    ANDREW on CKD Stage III. Cr prior to admission 1.3-1.7. UA consistent with 30 protein, 53 RBC, and moderate blood. Multiple renal ultrasounds with non-obstructive stone and no hydronephrosis. FeNA suggests intren  - Nephrology consult today  - Monitor Tac daily.   - Encourage po. Increased his free water flushes to 80 ml q4h  - Slow NS while NPO, then will go back to Tube feeds with free water flushes  - Q12 hour BMP     DM Type II.   - Management per Endo appreciated.      Elevated PSA with Concern for Chronic  "Prostatitis. PSA-8.21. MRI consistent with BPH.   - Cefdinir discontinued, Zosyn initiated for post obstructive CAP as above.   - Hematuria per UA.      Protein Calore Malnutrition. Decreased po intake with need for fluid resuscitation. Weight down 5 lbs from admission. Albumin 1.9 Prealbumin 4.  - Appreciate Nutrition consult.    - Will discuss cycling his tube feeds with endocrine and nutrition    Anemia. Baseline Hgb 7-8.   - Repeat Iron levels in AM.   - 1 unit PRBC 9/16 for Hgb 6.7.   - Heparin discontinued.   - UA with moderate hematuria, ASA discontinued.      FEN: Moderate Carb diet   PROPHY: Heparin subcutaneous discontinued with drop in Hgb.   LINES: PIV  DISPO: TBD. Therapy recommending TCU at this time  CODE STATUS: Full Code   ================================================================  Interval History/ROS: He is feeling okay today.Better than yesterday. Chest pain from last night is resolved.  However, he is now having some axillary pain that radiates over his left brest. 1/10 if not being touched, but much more sever with palpation. He \"feels like he has a rash\" although there is no redness or erythema on exam. No SOB. No diaphoresis. No N/V. He is having a lot of soft bowel movements, he says semi-formed, not diarrhea. No more blood in the urine.    Last 24 hr care team notes reviewed.   ROS:  4 point ROS including Respiratory, CV, GI and , other than that noted in the HPI, is negative.     Medications: Reviewed in EPIC.     Physical Exam:   BP (!) 155/109 (BP Location: Left arm, Cuff Size: Adult Regular)   Pulse 104   Temp 97.4  F (36.3  C) (Oral)   Resp 18   Ht 1.626 m (5' 4\")   Wt 83.5 kg (184 lb 1.6 oz)   SpO2 94%   BMI 31.60 kg/m    GENERAL: Appears alert and interacting appropriatly. Speaking in full sentances and able to communicate all needs.  HEENT: Eye symmetrical and free of discharge bilaterally. Mucous membranes moist and without lesions.  NECK: Supple and without " lymphadenopathy. JVD below clavicular line upright, lower third of neck at 45 degrees  CV: RRR, S1S2 present without murmur, rub, or gallop.   RESPIRATORY: Respirations regular, even, and unlabored. Lungs CTA throughout.   GI: Soft and non distended with normoactive bowel sounds present in all quadrants. No tenderness, rebound, guarding. No organomegaly.   EXTREMITIES: 1+  b/l bilateral LE peripheral edema. 2+ bilateral pedal pulses.   NEUROLOGIC: Alert and interacting appropriately. No focal deficits.   MUSCULOSKELETAL: No joint swelling. He dues have tenderness along his 6th and 7th left lateral ribs. No erythema or edema. No rashes or lesions.  SKIN: No jaundice. No rashes or lesions.     Data:  CMP  Recent Labs   Lab 09/17/20  0440 09/16/20  1958 09/16/20  0541 09/15/20  1412 09/15/20  0522 09/14/20  0546  09/12/20  0545    133 131* 132* 131* 129*   < > 128*   POTASSIUM 4.6 4.6 4.6 4.5 4.0 4.2   < > 4.1   CHLORIDE 103 102 99 102 100 99   < > 98   CO2 19* 23 21 23 21 22   < > 22   ANIONGAP 12 9 11 7 10 8   < > 8   * 237* 274* 261* 240* 205*   < > 201*   BUN 59* 54* 54* 40* 40* 34*   < > 31*   CR 3.75* 3.88* 3.68* 3.12* 2.73* 2.01*   < > 1.82*   GFRESTIMATED 17* 16* 17* 21* 25* 36*   < > 40*   GFRESTBLACK 19* 19* 20* 24* 28* 41*   < > 46*   ARLENE 8.0* 8.3* 8.2* 8.1* 8.3* 8.3*   < > 8.9   MAG 1.9  --  2.3  --  2.2 1.7   < > 2.2   PHOS 2.0*  --  3.0  --  4.3 3.6  --   --    PROTTOTAL 5.6*  --  5.6*  --   --   --   --  6.2*   ALBUMIN 1.5*  --  1.7*  --   --   --   --  1.9*   BILITOTAL 0.4  --  0.4  --   --   --   --  0.6   ALKPHOS 114  --  116  --   --   --   --  102   AST 87*  --  125*  --   --   --   --  85*   ALT 73*  --  79*  --   --   --   --  47    < > = values in this interval not displayed.     CBC  Recent Labs   Lab 09/17/20  0440 09/16/20  0541 09/15/20  1827 09/15/20  0642 09/15/20  0522 09/14/20  0546   WBC 3.3* 3.4*  --   --  4.7 8.0   RBC 2.91* 3.01*  --   --  2.48* 2.64*   HGB 7.9* 8.5* 8.0*  6.9* 6.7* 7.2*   HCT 25.9* 26.4*  --   --  21.9* 22.9*   MCV 89 88  --   --  88 87   MCH 27.1 28.2  --   --  27.0 27.3   MCHC 30.5* 32.2  --   --  30.6* 31.4*   RDW 13.6 13.7  --   --  13.6 13.6    404  --   --  413 426     Patient discussed with Dr. Nielson.      JENNIE ShipmanC  [facility]

## 2020-09-17 NOTE — PLAN OF CARE
D: Admitted 8/31 with syncope, SOB, cough, and fever. Found to have mass on RUL.   Hx: HTN, CAD, CKD3, hx of LV thrombus, PAF, DM2, ICM s/p LVAD HM3 with subsequent heart tx 5/18/20.     I: Monitored vitals and assessed pt status.   Changed: stopped TF at 0500 - NPO for Bronch today   Running:   - intermittent abx  PRN: imodium x1     A: A0x4, lethargic. VSS on RA/2L Oxy mask for comfort. Occasionally hypertensive. Tele shows SR/ST, rate 90-100s. Afebrile. Pt denied pain. Oliguric. +BM x8. Intermittent nausea. Dry cough. TF running at 45mL/hr (goal rate) until 5 AM. FW flushes 80 mL q4h. Sliding scale insulin and carb coverage given as needed. Poor sleep overnight d/t multiple BMs.      P: Bronchoscopy with EBUS-TBNA and endobronchial biopsies scheduled for 3:45 PM in OR. Continue to monitor Pt status and report changes to Cards 2.     [id number]  Marcy López RN on 9/17/2020 at 6:22 AM

## 2020-09-17 NOTE — PROGRESS NOTES
Nephrology Progress Note  09/17/2020         Assessment & Recommendations:   Mariusz Sharp is a 59 year old male with PMH of ICM s/p HM III LVAD as BTT with subsequent OHT 5/18/20, LV thrombus, DM Type II, . He presents with progressive SOB, cough, and fever and diarrhea.        #ANDREW on CKD II-III, non oliguric  Patients baseline Scr is likey ~1.3-1.6 with gradual rise to 3.6 from 9/9-9/16. He's had occasional loose stools, but no obvious acute renal insult in previous 10 days. Patient has normal cardiac function on echo (8/31). His tacrolimus levels have been mostly stable/in target range, though he did have some supratherapeutic levels ~9/6-9/8.  Renal US with non-obstructing stone, no hydro, no mass. Urine sodium 52 and FeNa 2.5% suggestive of intrinsic renal process. Suspect he may have developed some ATN due to combination of mild volume depletion and tacrolimus. Would recommend avoiding volume depletion with IVF as needed based on PO intake.  - IVF while NPO today  - avoid nephrotoxins  - renally dose medications  - follow Is/Os  - If creatinine continued to rise, we could consider renal biopsy to definitively rule out GN/AIN 2/2 PTLD/antibiotics, however would like to avoid given that pt would need to be off ASA to undergo renal biopsy     Recommendations were communicated to primary team via phone     Seen and discussed with Dr. Marcio Nugent MD  Nephrology Fellow  [phone]      Interval History :   Nursing and provider notes from last 24 hours reviewed.  Increased loose stools overnight- reports 4-5 episodes. At baseline he states he usually has 3-4 stools per day, but has recently had occasional days with more stools. Feels like appetite is worse now that he has NG tube. No SOB.    Review of Systems:   I reviewed the following systems:  GI: okay appetite. no nausea or vomiting. Increased loose stools.   Neuro:  no confusion  Constitutional:  no fever or chills  CV: no dyspnea, stable mild  "edema.  no chest pain.    Physical Exam:   I/O last 3 completed shifts:  In: 1823 [P.O.:360; I.V.:403; NG/GT:430]  Out: 1975 [Urine:1975]   /84   Pulse 98   Temp 97.4  F (36.3  C) (Oral)   Resp 23   Ht 1.626 m (5' 4\")   Wt 83.5 kg (184 lb 1.6 oz)   SpO2 97%   BMI 31.60 kg/m       GENERAL APPEARANCE: comfortable, NAD  EYES:  no scleral icterus, pupils equal  PULM: lungs clear to auscultation bilaterally, no increased WOB  CV: RRR     -trace edema  GI: soft, nontender, nondistended  INTEGUMENT: no cyanosis, no rash  NEURO:  AOx3     Labs:   All labs reviewed by me  Electrolytes/Renal -   Recent Labs   Lab Test 09/17/20  0440 09/16/20  1958 09/16/20  0541  09/15/20  0522    133 131*   < > 131*   POTASSIUM 4.6 4.6 4.6   < > 4.0   CHLORIDE 103 102 99   < > 100   CO2 19* 23 21   < > 21   BUN 59* 54* 54*   < > 40*   CR 3.75* 3.88* 3.68*   < > 2.73*   * 237* 274*   < > 240*   ARLENE 8.0* 8.3* 8.2*   < > 8.3*   MAG 1.9  --  2.3  --  2.2   PHOS 2.0*  --  3.0  --  4.3    < > = values in this interval not displayed.       CBC -   Recent Labs   Lab Test 09/17/20  0440 09/16/20  0541 09/15/20  1827  09/15/20  0522   WBC 3.3* 3.4*  --   --  4.7   HGB 7.9* 8.5* 8.0*   < > 6.7*    404  --   --  413    < > = values in this interval not displayed.       LFTs -   Recent Labs   Lab Test 09/17/20 0440 09/16/20 0541 09/12/20  0545   ALKPHOS 114 116 102   BILITOTAL 0.4 0.4 0.6   ALT 73* 79* 47   AST 87* 125* 85*   PROTTOTAL 5.6* 5.6* 6.2*   ALBUMIN 1.5* 1.7* 1.9*       Iron Panel -   Recent Labs   Lab Test 09/16/20  0541 06/26/19  0925 07/23/18  0645   IRON 30* 58 35   IRONSAT 17 16 13*   JOSE  --  17* 646*         Imaging:  Recent imaging reviewed.     Current Medications:    calcium carbonate 600 mg-vitamin D 400 units  1 tablet Oral BID w/meals     hydrALAZINE  50 mg Oral TID     insulin aspart   Subcutaneous Daily with lunch     insulin aspart   Subcutaneous QAM AC     insulin aspart   Subcutaneous Daily " with supper     lidocaine  1 patch Transdermal Q24h    And     lidocaine   Transdermal Q8H     multivitamins w/minerals  15 mL Per Feeding Tube Daily     mycophenolate  500 mg Oral BID IS     piperacillin-tazobactam  2.25 g Intravenous Q6H     rosuvastatin  10 mg Oral At Bedtime     sodium chloride (PF)  3 mL Intracatheter Q8H     [START ON 9/18/2020] tacrolimus  2 mg Oral QAM     tacrolimus  2.5 mg Oral QPM     valGANciclovir  450 mg Oral Every Other Day       dextrose       insulin (regular)       - MEDICATION INSTRUCTIONS -       sodium chloride 50 mL/hr at 09/17/20 1547     Lin Nugent MD

## 2020-09-17 NOTE — PLAN OF CARE
PT 6C / Discharge Planner PT   Patient plan for discharge: home  Current status: Patient completes bed mobility and transfers with SBA, ambulated ~300ft with close SBA, short shuffled steps and RADER, SpO2>95% on RA, -110s, Hypertensive /90s.   Barriers to return to prior living situation: medical needs, deconditioning  Recommendations for discharge: anticipate patient will be safe to return home with PRN assist  Rationale for recommendations: Patient was previously independent, making progress in therapy. Anticipate patient will be appropriate to discharge home with assist when medically ready for discharge.       Entered by: Neil Pulido 09/17/2020 9:29 AM

## 2020-09-17 NOTE — PROGRESS NOTES
"Addendum:   Team would like to cycle tube feeds tonight.   He got 6 mg Dexamethasone in OR.   Anticipating significant hyperglycemia due to steroids, and would require high dose of evening NPH for cycled feeds even without steroids on board.     Ordered IV insulin to start when back from OR - discontinued NPH for now. Cycled feeds from 6P-6A  We will utilize IV insulin rates to implement a plan for transition tomorrow evening.     IP Diabetes Management  Daily Note           Assessment and Plan:   HPI: Mariusz Sharp \"Red\" is a 59 year old male with history of CAD, LV thrombus, CKD Stage III, PAF, DM Type II, and ICM s/p HM III LVAD as BTT (2018) with subsequent OHT 5/18/20, presenting with progressive SOB, cough, and fever, found to have right hilar lung mass with infiltrate. S/P bronch with biopsy 9/15. Poor PO intake, and starting enteral feeds.    Assessment:   1)Type II Diabetes Mellitus,  2) Enteral feed-induced hyperglycemia     Plan:    -NPH 9 units AM this morning (dosed for TF off, NPO)   -NPH: increase to 38 units BID beginning 2000, to cover TF   -aspart CHO coverage with meals and snacks/supplements: increase to 1:6 with breakfast, 1:5g with lunch, and 1:5g with dinner, continue 1:6g with snacks   -aspart high intensity sliding scale TID AC, HS, and changed to threshold >140 HS and 0200 (on continuous TF)   -BG monitoring TID AC, HS, 0200   -hypoglycemia protocol   -diet with carb counting protocol   -diabetes education needs to be reassessed prior to discharge.     Outpatient follow up: TBD  Plan discussed with patient.       Interval History and Assessment: interval glucose trend reviewed:   Significant hyperglycemia overnight on current regimen.   NPO/hold TF since 0500; BG significantly improved following holding of TF. Needs higher NPH dose when TF resumed.     Basal requirement while NPO ~18 units daily given level of control today.     Current nutritional intake and type: Orders Placed This " Encounter      NPO per Anesthesia Guidelines for Procedure/Surgery Except for: Meds  continuous tube feeds: Nutren 1.5 (goal 45cc/hour, for 190g CHO daily)    PTA Diabetes Regimen:   CGM: Dexcom G6.  Lantus 32 units daily HS  Novolog 1:8g CHO and high intensity sliding scale AC/HS.    Discharge Planning: TBD           Diabetes History:   Type of Diabetes: Type 2 Diabetes Mellitus, TPN/Enteral Feeding Induced Hyperglycemia  Lab Results   Component Value Date    A1C 7.4 08/19/2020    A1C 7.2 08/13/2020    A1C 7.8 06/15/2020    A1C 8.0 05/12/2020    A1C 9.5 07/05/2019              Review of Systems:   The Review of Systems is negative other than noted in the Interval History.           Medications:     Current Facility-Administered Medications   Medication     [Auto Hold] acetaminophen (TYLENOL) tablet 975 mg     [Auto Hold] albuterol (PROVENTIL) neb solution 2.5 mg     [Auto Hold] benzocaine-menthol (CEPACOL) 15-3.6 MG lozenge 1 lozenge     [Auto Hold] calcium carbonate 600 mg-vitamin D 400 units (CALTRATE) per tablet 1 tablet     ceFAZolin (ANCEF) 1 g vial to attach to  ml bag for ADULT or 50 ml bag for PEDS     ceFAZolin (ANCEF) intermittent infusion 2 g in 100 mL dextrose PRE-MIX     dextrose 10% infusion     [Auto Hold] glucose gel 15-30 g    Or     [Auto Hold] dextrose 50 % injection 25-50 mL    Or     [Auto Hold] glucagon injection 1 mg     [Auto Hold] hydrALAZINE (APRESOLINE) injection 10 mg     [Auto Hold] hydrALAZINE (APRESOLINE) tablet 50 mg     [Auto Hold] insulin aspart (NovoLOG) injection (RAPID ACTING)     [Auto Hold] insulin aspart (NovoLOG) injection (RAPID ACTING)     [Auto Hold] insulin aspart (NovoLOG) injection (RAPID ACTING)     [Auto Hold] insulin aspart (NovoLOG) injection (RAPID ACTING)     [Auto Hold] insulin aspart (NovoLOG) injection (RAPID ACTING)     [Auto Hold] insulin aspart (NovoLOG) injection (RAPID ACTING)     insulin isophane human (HumuLIN N PEN) injection 38 Units     [Auto  "Hold] Lidocaine (LIDOCARE) 4 % Patch 1 patch    And     [Auto Hold] lidocaine patch in PLACE     [Auto Hold] lidocaine (LMX4) cream     [Auto Hold] lidocaine 1 % 0.1-1 mL     [Auto Hold] loperamide (IMODIUM) capsule 2 mg     [Auto Hold] magnesium sulfate 2 g in water intermittent infusion     [Auto Hold] magnesium sulfate 4 g in 100 mL sterile water (premade)     [Auto Hold] multivitamins w/minerals (CERTAVITE) liquid 15 mL     [Auto Hold] mycophenolate (GENERIC EQUIVALENT) tablet 500 mg     [Auto Hold] naloxone (NARCAN) injection 0.1-0.4 mg     [Auto Hold] ondansetron (ZOFRAN) tablet 4 mg     [Auto Hold] oxyCODONE (ROXICODONE) tablet 5 mg     Patient is already receiving mechanical prophylaxis     [Auto Hold] piperacillin-tazobactam (ZOSYN) 2.25 g vial to attach to  ml bag     [Auto Hold] potassium chloride (KLOR-CON) Packet 20-40 mEq     [Auto Hold] potassium chloride 10 mEq in 100 mL intermittent infusion with 10 mg lidocaine     [Auto Hold] potassium chloride 10 mEq in 100 mL sterile water intermittent infusion (premix)     [Auto Hold] potassium chloride 20 mEq in 50 mL intermittent infusion     [Auto Hold] potassium chloride ER (KLOR-CON M) CR tablet 20-40 mEq     [Auto Hold] prochlorperazine (COMPAZINE) injection 5 mg     [Auto Hold] rosuvastatin (CRESTOR) tablet 10 mg     [Auto Hold] sodium chloride (NEBUSAL) 3 % neb solution 3 mL     [Auto Hold] sodium chloride (PF) 0.9% PF flush 3 mL     [Auto Hold] sodium chloride (PF) 0.9% PF flush 3 mL     sodium chloride 0.9% infusion     [Auto Hold] tacrolimus (GENERIC EQUIVALENT) capsule 2 mg     [Auto Hold] tacrolimus (GENERIC EQUIVALENT) capsule 2.5 mg     [Auto Hold] valGANciclovir (VALCYTE) tablet 450 mg            Physical Exam:    BP (!) 150/106   Pulse 101   Temp (P) 97.6  F (36.4  C) (Oral)   Resp 23   Ht 1.626 m (5' 4\")   Wt 83.5 kg (184 lb 1.6 oz)   SpO2 96%   BMI 31.60 kg/m    General: pleasant, in no distress, resting flat in bed   HEENT: " normocephalic, atraumatic. Oral mucous membranes moist.   Lungs: unlabored respiration, no cough, facemask O2  ABD: rounded, soft  Skin: warm and dry, no obvious lesions  MSK:  moves all extremities  Lymp:  no LE edema   Mental status:  alert, oriented to self, place, time  Psych:  affect, calm and appropriate interaction          Data:     Recent Labs   Lab 09/17/20  1139 09/17/20  0718 09/17/20  0440 09/17/20  0214 09/16/20  2123 09/16/20  1958 09/16/20  1712 09/16/20  1225  09/16/20  0541  09/15/20  1412  09/15/20  0522  09/14/20  0546   GLC  --   --  301*  --   --  237*  --   --   --  274*  --  261*  --  240*  --  205*   * 249*  --  320* 249*  --  249* 235*   < >  --    < >  --    < >  --    < >  --     < > = values in this interval not displayed.     Lab Results   Component Value Date    WBC 3.3 (L) 09/17/2020    WBC 3.4 (L) 09/16/2020    WBC 4.7 09/15/2020    HGB 7.9 (L) 09/17/2020    HGB 8.5 (L) 09/16/2020    HGB 8.0 (L) 09/15/2020    HCT 25.9 (L) 09/17/2020    HCT 26.4 (L) 09/16/2020    HCT 21.9 (L) 09/15/2020    MCV 89 09/17/2020    MCV 88 09/16/2020    MCV 88 09/15/2020     09/17/2020     09/16/2020     09/15/2020     Lab Results   Component Value Date     09/17/2020     09/16/2020     (L) 09/16/2020    POTASSIUM 4.6 09/17/2020    POTASSIUM 4.6 09/16/2020    POTASSIUM 4.6 09/16/2020    CHLORIDE 103 09/17/2020    CHLORIDE 102 09/16/2020    CHLORIDE 99 09/16/2020    CO2 19 (L) 09/17/2020    CO2 23 09/16/2020    CO2 21 09/16/2020     (H) 09/17/2020     (H) 09/16/2020     (H) 09/16/2020     Lab Results   Component Value Date    BUN 59 (H) 09/17/2020    BUN 54 (H) 09/16/2020    BUN 54 (H) 09/16/2020     Lab Results   Component Value Date    TSH 3.94 06/26/2019    TSH 6.91 (H) 07/23/2018     Lab Results   Component Value Date    AST 87 (H) 09/17/2020     (H) 09/16/2020    AST 85 (H) 09/12/2020    ALT 73 (H) 09/17/2020    ALT 79 (H)  09/16/2020    ALT 47 09/12/2020    ALKPHOS 114 09/17/2020    ALKPHOS 116 09/16/2020    ALKPHOS 102 09/12/2020       Diabetes Management Team job code: [id number]  I spent a total of 35 minutes bedside and on the inpatient unit managing glycemic care. Over 50% of my time on the unit was spent counseling the patient and/or coordinating care regarding acute hyperglycemia management.  See note for details.    Juana Ly PA-C  Inpatient Diabetes Management Service  Pager [phone]

## 2020-09-17 NOTE — ANESTHESIA CARE TRANSFER NOTE
Patient: Mariusz Sharp    Procedure(s):  BRONCHOSCOPY, flexible, endobronchial ultrasound with transbronchial biopsies, endobronchial biopsies  BRONCHOSCOPY, WITH BRONCHOALVEOLAR LAVAGE    Diagnosis: Lung mass [R91.8]  Diagnosis Additional Information: No value filed.    Anesthesia Type:   General     Note:  Airway :Face Mask  Patient transferred to:PACU  Comments: VSS, report given to RN.  Handoff Report: Identifed the Patient, Identified the Reponsible Provider, Reviewed the pertinent medical history, Discussed the surgical course, Reviewed Intra-OP anesthesia mangement and issues during anesthesia, Set expectations for post-procedure period and Allowed opportunity for questions and acknowledgement of understanding      Vitals: (Last set prior to Anesthesia Care Transfer)    CRNA VITALS  9/17/2020 1432 - 9/17/2020 1506      9/17/2020             Pulse: 99    Sat  BP 99  86/68 (MAP 73)              Resp Rate (observed): 14                Electronically Signed By: CARMEN Mccain CRNA  September 17, 2020  3:06 PM

## 2020-09-17 NOTE — PROGRESS NOTES
Calorie Count  Intake recorded for: 9/16  Total Kcals: 903 Total Protein: 38g  Kcals from Hospital Food: 903  Protein: 38g  Kcals from Outside Food (average):0 Protein: 0g  # Meals Recorded: 2 meals (First - 100% cheerios, 2 whole milks, 25% banana)      (Second - 100% milk, 75% egg noodles w/ steven)  # Supplements Recorded: 50% 1 Boost Breeze

## 2020-09-17 NOTE — PROVIDER NOTIFICATION
Temp:  [96.8  F (36  C)-98  F (36.7  C)] 96.8  F (36  C)  Pulse:  [100-110] 100  Resp:  [16-25] 22  BP: ()/() 80/65  SpO2:  [93 %-99 %] 99 %    Time of notification: 1520  Provider notified: Dr. Sasha Nolan  Patient status: CXR complete  Orders received: CXR looks good per Dr. Nolan, patient okay to transfer back to     Will continue to monitor.

## 2020-09-17 NOTE — ANESTHESIA POSTPROCEDURE EVALUATION
Anesthesia POST Procedure Evaluation    Patient: Mariusz Sharp   MRN:     [id number] Gender:   male   Age:    59 year old :      1960        Preoperative Diagnosis: Lung mass [R91.8]   Procedure(s):  BRONCHOSCOPY, flexible, endobronchial ultrasound with transbronchial biopsies, endobronchial biopsies  BRONCHOSCOPY, WITH BRONCHOALVEOLAR LAVAGE   Postop Comments: No value filed.     Anesthesia Type: General          Postop Pain Control: Uneventful            Sign Out: Well controlled pain   PONV: No   Neuro/Psych: Uneventful            Sign Out: Acceptable/Baseline neuro status   Airway/Respiratory: Uneventful            Sign Out: Acceptable/Baseline resp. status   CV/Hemodynamics: Uneventful            Sign Out: Acceptable CV status   Other NRE: NONE   DID A NON-ROUTINE EVENT OCCUR? No         Last Anesthesia Record Vitals:  CRNA VITALS  2020 1432 - 2020 1532      2020             Pulse:  103    Ht Rate:  100    Temp:  34.4  C (93.9  F)    SpO2:  100 %    Resp Rate (observed):  (!) 6          Last PACU Vitals:  Vitals Value Taken Time   BP 95/72 2020  3:30 PM   Temp 36  C (96.8  F) 2020  3:05 PM   Pulse 101 2020  3:34 PM   Resp 19 2020  3:34 PM   SpO2 98 % 2020  3:34 PM   Temp src     NIBP     Pulse     SpO2     Resp     Temp     Ht Rate     Temp 2     Vitals shown include unvalidated device data.      Electronically Signed By: Abraham Skaggs MD, 2020, 3:35 PM

## 2020-09-17 NOTE — PROGRESS NOTES
CLINICAL NUTRITION SERVICES     Nutrition Prescription    RECOMMENDATIONS FOR MDs/PROVIDERS TO ORDER:  For all recs, see prior nutrition notes.    Malnutrition Status:    See prior nutrition notes.    Recommendations already ordered by Registered Dietitian (RD):  Modified TFs to cycled, supplemental.    Future/Additional Recommendations:  For all recs, see prior nutrition notes.     Received page from team. Per team, pt is motivated to eat and would like some time off the TFs. Requesting cycled TFs.      Diet: No diet order currently (pt in PACU). Has orders for orange or berry Boost Breeze with all meals and Gelatein 20 (between meals) once diet advances. Note, pt consumed 903 kcals and 38 g protein per kcal counts 9/16.  TFs (via NDT): Nutren 1.5 at goal rate of 45 mL/hr continuously (prior to bronch and bx)    INTERVENTIONS:  Implementation:  Collaboration with other providers, Enteral Nutrition: Discussed pt with team and Endo. Ok to cycle TFs starting this evening (plan is to start IV insulin). New TF regimen will be Nutren 1.5 at 65 mL/hr x 12 hrs (6p-6a) to provide 780 mL TF, 1170 kcals, 53 g protein, 137 g CHO, 593 mL H2O, and no fiber daily. Regimen provides about 75% of kcal needs. Notified RN of the nutrition plan.     Follow up/Monitoring:  Will continue to follow pt.    Dorcas Meek, MS, RD, LD, [facility]   6C Pgr: [phone]

## 2020-09-17 NOTE — PROVIDER NOTIFICATION
"BP (!) 80/65   Pulse 100   Temp 96.8  F (36  C) (Axillary)   Resp (P) 22   Ht 1.626 m (5' 4\")   Wt 83.5 kg (184 lb 1.6 oz)   SpO2 99%   BMI 31.60 kg/m        Time of notification: 4924  Provider notified: Dr. Skaggs with anesthesia  Patient status:  and BPs soft  Orders received: give patient 250mL NS bolus and continue to monitor BP, keep sBP >90.    Floor order sliding scale insulin can be used to treat blood glucose.    Will continue to monitor.    "

## 2020-09-17 NOTE — OR NURSING
Paged Dr Lemus for preop orders, orders in 1320. Ok per Dr Skaggs to give scheduled zosyn and hydralazine 1400. Will start MIVF NS @ 50

## 2020-09-17 NOTE — PROCEDURES
INTERVENTIONAL PULMONOLOGY       Procedure(s):    A flexible bronchoscopy  Airway exam  EBUS-TBNA (1 sites)  Therapeutic suctioning (1 sites)    Indication:  Hx of heart transplant, growing RUL mass    Attending of Record:  Bill Lemus MD     Interventional Pulmonary Fellow   Sasha Nolan MD     Trainees Present:   None     Medications:    General Anesthesia - See anesthesia flowsheet for details    Sedation Time:   Per Anesthesia Care Provider    Time Out:  Performed    The patient's medical record has been reviewed.  The indication for the procedure was reviewed.  The necessary history and physical examination was performed and reviewed.  The risks, benefits and alternatives of the procedure were discussed with the the patient in detail and he had the opportunity to ask questions. All questions were answered to the best of my ability.  Verbal and written informed consent was obtained.  The proposed procedure and the patient's identification were verified prior to the procedure by the physician and the surgical team.    After clinical evaluation and reviewing the indication, risks, alternatives and benefits of the procedure the patient was deemed to be in satisfactory condition to undergo the procedure.      A Tuberculosis risk assessment was performed:  The patient has no known RISK of Tuberculosis    The procedure was performed in a negative airflow room: The patient could not be moved to a negative airflow room because of needed OR for the procedure    Maneuvers / Procedure:      Airway Examination: A complete airway examination was performed from the distal trachea to the subsegmental level in each lobe of both lungs.  Pertinent findings include: Edematous airways in RUL, endobronchial lesion could not be visualized.         EBUS-TBNA: The EBUS scope was inserted and biopsies were obtained from   RUL lung mass with 6 passes, 6 samples obtained with EREN absent, a combination of suction and no suction  was used to obtain the samples. EBUS samples were sent for cytology, bacterial culture, fungal culture and RPMI                                    Therapeutic suctioning: 15-20min of operative time was spent clearing out the airway of debris, blood and mucous prior to the intervention.     Any disposable equipment was visually inspected and deemed to be intact immediately post procedure.      Relevant Pictures  Edematous RUL airways      Recommendations:     --Return to floor  --Followup culture and cytology results    Bill Lemus MD, MHA    of Medicine  Interventional Pulmonary  Department of Pulmonary, Allergy, Critical Care and Sleep Medicine   [facility]  Pager: [phone]   Office: [phone]  Email: [email or web address]    Kathy TOWNSEND, OCN  Clinical Nurse Specialist  Department of Thoracic Surgery  Office: [phone]  Email: [email or web address]    Kalyn Willis  Interventional Pulmonology Surgery Scheduler  Office: [phone]  Email: [email or web address]

## 2020-09-17 NOTE — ANESTHESIA PREPROCEDURE EVALUATION
Anesthesia Pre-Procedure Evaluation    Patient: Mariusz Sharp   MRN:     [id number] Gender:   male   Age:    59 year old :      1960        Preoperative Diagnosis: Lung mass [R91.8]   Procedure(s):  BRONCHOSCOPY, flexible, endobronchial ultrasound with transbronchial biopsies, endobronchial biopsies  BRONCHOSCOPY, WITH BRONCHOALVEOLAR LAVAGE     LABS:  CBC:   Lab Results   Component Value Date    WBC 3.3 (L) 2020    WBC 3.4 (L) 2020    HGB 7.9 (L) 2020    HGB 8.5 (L) 2020    HCT 25.9 (L) 2020    HCT 26.4 (L) 2020     2020     2020     BMP:   Lab Results   Component Value Date     2020     2020    POTASSIUM 4.6 2020    POTASSIUM 4.6 2020    CHLORIDE 103 2020    CHLORIDE 102 2020    CO2 19 (L) 2020    CO2 23 2020    BUN 59 (H) 2020    BUN 54 (H) 2020    CR 3.75 (H) 2020    CR 3.88 (H) 2020     (H) 2020     (H) 2020     COAGS:   Lab Results   Component Value Date    PTT 31 2020    INR 1.06 2020    FIBR 279 2020     POC:   Lab Results   Component Value Date     (H) 2020     OTHER:   Lab Results   Component Value Date    PH 7.40 2020    LACT 0.7 2020    A1C 7.4 (H) 2020    ARLENE 8.0 (L) 2020    PHOS 2.0 (L) 2020    MAG 1.9 2020    ALBUMIN 1.5 (L) 2020    PROTTOTAL 5.6 (L) 2020    ALT 73 (H) 2020    AST 87 (H) 2020    ALKPHOS 114 2020    BILITOTAL 0.4 2020    AMYLASE 56 2020    TSH 3.94 2019    T4 1.05 2018    CRP 11.0 (H) 2018        Preop Vitals    BP Readings from Last 3 Encounters:   20 (!) 150/106   20 115/75   20 122/85    Pulse Readings from Last 3 Encounters:   20 101   20 127   20 116      Resp Readings from Last 3 Encounters:   20 23   20 18   20 16    SpO2  "Readings from Last 3 Encounters:   09/17/20 97%   08/26/20 97%   08/26/20 100%      Temp Readings from Last 1 Encounters:   09/17/20 36.4  C (97.6  F) (Oral)    Ht Readings from Last 1 Encounters:   08/31/20 1.626 m (5' 4\")      Wt Readings from Last 1 Encounters:   09/17/20 83.5 kg (184 lb 1.6 oz)    Estimated body mass index is 31.6 kg/m  as calculated from the following:    Height as of this encounter: 1.626 m (5' 4\").    Weight as of this encounter: 83.5 kg (184 lb 1.6 oz).     LDA:  Peripheral IV 09/13/20 Right;Anterior Upper forearm (Active)   Site Assessment [facility] 09/17/20 1315   Line Status Saline locked 09/17/20 1315   Phlebitis Scale 0-->no symptoms 09/17/20 1315   Infiltration Scale 0 09/17/20 1315   Infiltration Site Treatment Method  None 09/17/20 0812   Extravasation? No 09/17/20 1315   Number of days: 4       Peripheral IV 09/13/20 Right;Lateral Lower forearm (Active)   Site Assessment [facility] 09/17/20 1315   Line Status Saline locked 09/17/20 1315   Phlebitis Scale 0-->no symptoms 09/17/20 1315   Infiltration Scale 0 09/17/20 1315   Infiltration Site Treatment Method  None 09/17/20 0812   Extravasation? No 09/17/20 1315   Number of days: 4       NG/OG/NJ Tube Nasogastric Right nostril (Active)   Site Description [facility] 09/17/20 1315   Status Clamped 09/17/20 1315   Placement Confirmation [place] unchanged 09/17/20 1315   [place] (cm marking) at nare/mouth 96 cm 09/17/20 1315   Intake (ml) 30 ml 09/17/20 0841   Flush/Free Water (mL) 80 mL 09/17/20 0841   Residual (mL) 0 mL 09/17/20 0841   Container Amount 0 mL 09/17/20 0841   Output (ml) 0 ml 09/17/20 0841   Number of days: 3        Past Medical History:   Diagnosis Date     Acute kidney injury (H)      CKD (chronic kidney disease)      Coronary artery disease      Diabetes mellitus (H)      HTN (hypertension)      Hyperlipidemia      ICD (implantable cardioverter-defibrillator), single chamber- NOT dependent 7/17/2018     Ischemic cardiomyopathy      LV " (left ventricular) mural thrombus      Paroxysmal atrial flutter (H)      RVF (right ventricular failure) (H)      Systolic heart failure (H)      Ventricular tachycardia (H)       Past Surgical History:   Procedure Laterality Date     BRONCHOSCOPY (RIGID OR FLEXIBLE), DIAGNOSTIC N/A 9/15/2020    Procedure: BRONCHOSCOPY, WITH BRONCHOALVEOLAR LAVAGE;  Surgeon: Elder Mejia MD;  Location:  GI     BRONCHOSCOPY RIGID OR FLEXIBLE W/TRANSENDOSCOPIC ENDOBRONCHIAL ULTRASOUND GUIDED Right 9/8/2020    Procedure: BRONCHOSCOPY, WITH ENDOBRONCHIAL ULTRASOUND;  Surgeon: Donny Mercedes MD;  Location:  GI     COLONOSCOPY N/A 12/7/2018    Procedure: COLONOSCOPY;  Surgeon: Krish Mercado MD;  Location: [facility] OR     CV HEART BIOPSY N/A 5/24/2020    Procedure: Heart Biopsy;  Surgeon: Kit Bacon MD;  Location:  HEART CARDIAC CATH LAB     CV HEART BIOPSY N/A 6/1/2020    Procedure: CV HEART BIOPSY;  Surgeon: Kit Bacon MD;  Location:  HEART CARDIAC CATH LAB     CV HEART BIOPSY N/A 6/8/2020    Procedure: CV HEART BIOPSY;  Surgeon: Kit Bacon MD;  Location:  HEART CARDIAC CATH LAB     CV HEART BIOPSY N/A 6/15/2020    Procedure: CV HEART BIOPSY;  Surgeon: Kit Bacon MD;  Location:  HEART CARDIAC CATH LAB     CV HEART BIOPSY N/A 6/30/2020    Procedure: CV HEART BIOPSY;  Surgeon: Kit Bacon MD;  Location:  HEART CARDIAC CATH LAB     CV HEART BIOPSY N/A 7/14/2020    Procedure: CV HEART BIOPSY;  Surgeon: Brian Long MD;  Location:  HEART CARDIAC CATH LAB     CV HEART BIOPSY N/A 7/29/2020    Procedure: CV HEART BIOPSY;  Surgeon: Momo Hunt MD;  Location:  HEART CARDIAC CATH LAB     CV HEART BIOPSY N/A 8/13/2020    Procedure: CV HEART BIOPSY;  Surgeon: Khris Mcclelland MD;  Location:  HEART CARDIAC CATH LAB     CV HEART BIOPSY N/A 8/26/2020    Procedure: CV HEART BIOPSY;  Surgeon: Momo Hunt  MD Matias;  Location:  HEART CARDIAC CATH LAB     CV RIGHT HEART CATH N/A 2/19/2019    Procedure: RHC;  Surgeon: Brian Long MD;  Location:  HEART CARDIAC CATH LAB     CV RIGHT HEART CATH N/A 7/5/2019    Procedure: CV RIGHT HEART CATH;  Surgeon: Khris Mcclelland MD;  Location:  HEART CARDIAC CATH LAB     CV RIGHT HEART CATH N/A 5/24/2020    Procedure: Right Heart Cath;  Surgeon: Kit Bacon MD;  Location:  HEART CARDIAC CATH LAB     CV RIGHT HEART CATH N/A 6/1/2020    Procedure: CV RIGHT HEART CATH;  Surgeon: Kit Bacon MD;  Location:  HEART CARDIAC CATH LAB     CV RIGHT HEART CATH N/A 6/8/2020    Procedure: CV RIGHT HEART CATH;  Surgeon: Kit Bacon MD;  Location:  HEART CARDIAC CATH LAB     CV RIGHT HEART CATH N/A 6/15/2020    Procedure: CV RIGHT HEART CATH;  Surgeon: Kit Bacon MD;  Location:  HEART CARDIAC CATH LAB     CV RIGHT HEART CATH N/A 6/30/2020    Procedure: CV RIGHT HEART CATH;  Surgeon: Kit Bacon MD;  Location:  HEART CARDIAC CATH LAB     CV RIGHT HEART CATH N/A 7/14/2020    Procedure: CV RIGHT HEART CATH;  Surgeon: Brian Long MD;  Location:  HEART CARDIAC CATH LAB     CV RIGHT HEART CATH N/A 7/29/2020    Procedure: CV RIGHT HEART CATH;  Surgeon: Momo Hunt MD;  Location:  HEART CARDIAC CATH LAB     CV RIGHT HEART CATH N/A 8/13/2020    Procedure: CV RIGHT HEART CATH;  Surgeon: Khris Mcclelland MD;  Location:  HEART CARDIAC CATH LAB     CV RIGHT HEART CATH N/A 8/26/2020    Procedure: CV RIGHT HEART CATH;  Surgeon: Momo Hunt MD;  Location:  HEART CARDIAC CATH LAB     HC PRQ TRLUML CORONARY ANGIOPLASTY ADDL BRANCH      PCI and ALYSSA to ostial LAD on 6/27/18     IMPLANT AUTOMATIC IMPLANTABLE CARDIOVERTER DEFIBRILLATOR       INSERT VENTRICULAR ASSIST DEVICE LEFT (HEARTMATE II) N/A 12/31/2018    Procedure: Median Sternotomy, On  Cardiopulmonary Bypass Pump, Insertion of Left Ventricular Assist Device (Heartmate III);  Surgeon: Kamaljit Baker MD;  Location: [facility] OR     PICC DOUBLE LUMEN PLACEMENT Right 05/22/2020    5FR DL PICC inserted at right basilic vein, length 38cm.     TRANSPLANT HEART RECIPIENT AFTER HEART MATE N/A 5/18/2020    Procedure: REDO MEDIAN STERNOTOMY, LYSIS OF ADHESIONS, ON CARDIOPULMONARY BYPASS PUMP, HEART TRANSPLANT RECIPIENT, REMOVAL OF LEFT VENTRICULAR ASSIST DEVICE, REMOVAL OF AUTOMATED IMPLANTABLE CARDIOVERTER DEFIBRILLATOR;  Surgeon: Elder Willis MD;  Location: [facility] OR      No Known Allergies     Anesthesia Evaluation     .             ROS/MED HX    ENT/Pulmonary:     (+), recent URI . .    Neurologic:       Cardiovascular: Comment: Statis (pst heart tx in may of this year    (+) hypertension--CAD, --. : . CHF . . :. .       METS/Exercise Tolerance:     Hematologic:         Musculoskeletal:         GI/Hepatic:         Renal/Genitourinary:     (+) chronic renal disease,       Endo:     (+) type I DM, Obesity, .      Psychiatric:         Infectious Disease:         Malignancy:         Other:                         PHYSICAL EXAM:   Mental Status/Neuro: A/A/O   Airway: Facies: Feasible  Mallampati: II  Neck ROM: Limited   Respiratory: Auscultation: CTAB      CV: Rhythm: Regular   Comments:                      Assessment:   ASA SCORE: 3    H&P: History and physical reviewed and following examination; no interval change.   Smoking Status:  Non-Smoker/Unknown   NPO Status: NPO Appropriate     Plan:   Anes. Type:  General   Pre-Medication: None   Induction:  IV (Standard)   Airway: ETT; Oral   Access/Monitoring: PIV   Maintenance: Balanced     Postop Plan:   Postop Pain: Opioids  Postop Sedation/Airway: Not planned  Disposition: Inpatient/Admit     PONV Management:   Adult Risk Factors:, Non-Smoker, Postop Opioids   Prevention: Ondansetron                   Abraham Skaggs MD

## 2020-09-17 NOTE — PROVIDER NOTIFICATION
When taking pt VS at 2335 pt's BP was 152/109, recheck 156/106. . Pt then reported R and L sided chest pain that he said he had not experienced before. HR tachy at 106. Pt denied any increased SOB. Cards 2 crosscover Raegan HURTADO notified. EKG and Troponin ordered. Troponin negative. One time dose Labetalol ordered and given with slight improvement in BP (140/98). Pt reported decreased chest pain after labetalol given. Will continue to monitor and report changed to Cards 2.

## 2020-09-17 NOTE — PROGRESS NOTES
TRANSFER:  Transferred to: 3C pre-op  Via: Wheelchair  Reason for transfer: Bronchoscopy  Chart: Sent with transport  Report called to: GERMAN Ryan

## 2020-09-18 ENCOUNTER — APPOINTMENT (OUTPATIENT)
Dept: PHYSICAL THERAPY | Facility: CLINIC | Age: 60
End: 2020-09-18
Payer: COMMERCIAL

## 2020-09-18 PROBLEM — N17.9 ACUTE KIDNEY FAILURE, UNSPECIFIED (H): Status: ACTIVE | Noted: 2020-09-18

## 2020-09-18 LAB
ALBUMIN SERPL-MCNC: 1.8 G/DL (ref 3.4–5)
ALP SERPL-CCNC: 108 U/L (ref 40–150)
ALT SERPL W P-5'-P-CCNC: 66 U/L (ref 0–70)
ANION GAP SERPL CALCULATED.3IONS-SCNC: 7 MMOL/L (ref 3–14)
ANION GAP SERPL CALCULATED.3IONS-SCNC: 7 MMOL/L (ref 3–14)
AST SERPL W P-5'-P-CCNC: 69 U/L (ref 0–45)
BILIRUB DIRECT SERPL-MCNC: 0.1 MG/DL (ref 0–0.2)
BILIRUB SERPL-MCNC: 0.4 MG/DL (ref 0.2–1.3)
BUN SERPL-MCNC: 60 MG/DL (ref 7–30)
BUN SERPL-MCNC: 66 MG/DL (ref 7–30)
CALCIUM SERPL-MCNC: 8.4 MG/DL (ref 8.5–10.1)
CALCIUM SERPL-MCNC: 8.7 MG/DL (ref 8.5–10.1)
CHLORIDE SERPL-SCNC: 107 MMOL/L (ref 94–109)
CHLORIDE SERPL-SCNC: 108 MMOL/L (ref 94–109)
CO2 SERPL-SCNC: 20 MMOL/L (ref 20–32)
CO2 SERPL-SCNC: 21 MMOL/L (ref 20–32)
COPATH REPORT: NORMAL
CREAT SERPL-MCNC: 3.45 MG/DL (ref 0.66–1.25)
CREAT SERPL-MCNC: 3.5 MG/DL (ref 0.66–1.25)
ERYTHROCYTE [DISTWIDTH] IN BLOOD BY AUTOMATED COUNT: 13.6 % (ref 10–15)
GFR SERPL CREATININE-BSD FRML MDRD: 18 ML/MIN/{1.73_M2}
GFR SERPL CREATININE-BSD FRML MDRD: 18 ML/MIN/{1.73_M2}
GLUCOSE BLDC GLUCOMTR-MCNC: 105 MG/DL (ref 70–99)
GLUCOSE BLDC GLUCOMTR-MCNC: 105 MG/DL (ref 70–99)
GLUCOSE BLDC GLUCOMTR-MCNC: 110 MG/DL (ref 70–99)
GLUCOSE BLDC GLUCOMTR-MCNC: 114 MG/DL (ref 70–99)
GLUCOSE BLDC GLUCOMTR-MCNC: 117 MG/DL (ref 70–99)
GLUCOSE BLDC GLUCOMTR-MCNC: 123 MG/DL (ref 70–99)
GLUCOSE BLDC GLUCOMTR-MCNC: 125 MG/DL (ref 70–99)
GLUCOSE BLDC GLUCOMTR-MCNC: 131 MG/DL (ref 70–99)
GLUCOSE BLDC GLUCOMTR-MCNC: 132 MG/DL (ref 70–99)
GLUCOSE BLDC GLUCOMTR-MCNC: 133 MG/DL (ref 70–99)
GLUCOSE BLDC GLUCOMTR-MCNC: 138 MG/DL (ref 70–99)
GLUCOSE BLDC GLUCOMTR-MCNC: 141 MG/DL (ref 70–99)
GLUCOSE BLDC GLUCOMTR-MCNC: 142 MG/DL (ref 70–99)
GLUCOSE BLDC GLUCOMTR-MCNC: 146 MG/DL (ref 70–99)
GLUCOSE BLDC GLUCOMTR-MCNC: 153 MG/DL (ref 70–99)
GLUCOSE BLDC GLUCOMTR-MCNC: 154 MG/DL (ref 70–99)
GLUCOSE BLDC GLUCOMTR-MCNC: 156 MG/DL (ref 70–99)
GLUCOSE BLDC GLUCOMTR-MCNC: 164 MG/DL (ref 70–99)
GLUCOSE BLDC GLUCOMTR-MCNC: 171 MG/DL (ref 70–99)
GLUCOSE BLDC GLUCOMTR-MCNC: 176 MG/DL (ref 70–99)
GLUCOSE BLDC GLUCOMTR-MCNC: 198 MG/DL (ref 70–99)
GLUCOSE BLDC GLUCOMTR-MCNC: 200 MG/DL (ref 70–99)
GLUCOSE BLDC GLUCOMTR-MCNC: 230 MG/DL (ref 70–99)
GLUCOSE BLDC GLUCOMTR-MCNC: 265 MG/DL (ref 70–99)
GLUCOSE BLDC GLUCOMTR-MCNC: 86 MG/DL (ref 70–99)
GLUCOSE SERPL-MCNC: 139 MG/DL (ref 70–99)
GLUCOSE SERPL-MCNC: 171 MG/DL (ref 70–99)
HCT VFR BLD AUTO: 25.6 % (ref 40–53)
HGB BLD-MCNC: 7.7 G/DL (ref 13.3–17.7)
MAGNESIUM SERPL-MCNC: 1.9 MG/DL (ref 1.6–2.3)
MCH RBC QN AUTO: 27 PG (ref 26.5–33)
MCHC RBC AUTO-ENTMCNC: 30.1 G/DL (ref 31.5–36.5)
MCV RBC AUTO: 90 FL (ref 78–100)
PHOSPHATE SERPL-MCNC: 1.5 MG/DL (ref 2.5–4.5)
PLATELET # BLD AUTO: 348 10E9/L (ref 150–450)
POTASSIUM SERPL-SCNC: 5.1 MMOL/L (ref 3.4–5.3)
POTASSIUM SERPL-SCNC: 5.2 MMOL/L (ref 3.4–5.3)
PROT SERPL-MCNC: 5.8 G/DL (ref 6.8–8.8)
RBC # BLD AUTO: 2.85 10E12/L (ref 4.4–5.9)
SODIUM SERPL-SCNC: 134 MMOL/L (ref 133–144)
SODIUM SERPL-SCNC: 136 MMOL/L (ref 133–144)
TACROLIMUS BLD-MCNC: 7.2 UG/L (ref 5–15)
TME LAST DOSE: NORMAL H
WBC # BLD AUTO: 4 10E9/L (ref 4–11)

## 2020-09-18 PROCEDURE — 21400000 ZZH R&B CCU UMMC

## 2020-09-18 PROCEDURE — 25000132 ZZH RX MED GY IP 250 OP 250 PS 637: Performed by: STUDENT IN AN ORGANIZED HEALTH CARE EDUCATION/TRAINING PROGRAM

## 2020-09-18 PROCEDURE — 25800030 ZZH RX IP 258 OP 636: Performed by: PHYSICIAN ASSISTANT

## 2020-09-18 PROCEDURE — 25000128 H RX IP 250 OP 636: Performed by: NURSE PRACTITIONER

## 2020-09-18 PROCEDURE — 25000131 ZZH RX MED GY IP 250 OP 636 PS 637: Performed by: NURSE PRACTITIONER

## 2020-09-18 PROCEDURE — 80048 BASIC METABOLIC PNL TOTAL CA: CPT | Performed by: PHYSICIAN ASSISTANT

## 2020-09-18 PROCEDURE — 25000125 ZZHC RX 250: Performed by: PHYSICIAN ASSISTANT

## 2020-09-18 PROCEDURE — 25000132 ZZH RX MED GY IP 250 OP 250 PS 637: Performed by: PHYSICIAN ASSISTANT

## 2020-09-18 PROCEDURE — 84100 ASSAY OF PHOSPHORUS: CPT | Performed by: NURSE PRACTITIONER

## 2020-09-18 PROCEDURE — 36415 COLL VENOUS BLD VENIPUNCTURE: CPT | Performed by: NURSE PRACTITIONER

## 2020-09-18 PROCEDURE — 97530 THERAPEUTIC ACTIVITIES: CPT | Mod: GP

## 2020-09-18 PROCEDURE — 80048 BASIC METABOLIC PNL TOTAL CA: CPT | Performed by: NURSE PRACTITIONER

## 2020-09-18 PROCEDURE — 00000146 ZZHCL STATISTIC GLUCOSE BY METER IP

## 2020-09-18 PROCEDURE — 80197 ASSAY OF TACROLIMUS: CPT | Performed by: PHYSICIAN ASSISTANT

## 2020-09-18 PROCEDURE — 80076 HEPATIC FUNCTION PANEL: CPT | Performed by: NURSE PRACTITIONER

## 2020-09-18 PROCEDURE — 83735 ASSAY OF MAGNESIUM: CPT | Performed by: NURSE PRACTITIONER

## 2020-09-18 PROCEDURE — 25000132 ZZH RX MED GY IP 250 OP 250 PS 637: Performed by: NURSE PRACTITIONER

## 2020-09-18 PROCEDURE — 36415 COLL VENOUS BLD VENIPUNCTURE: CPT | Performed by: PHYSICIAN ASSISTANT

## 2020-09-18 PROCEDURE — 27210429 ZZH NUTRITION PRODUCT INTERMEDIATE LITER

## 2020-09-18 PROCEDURE — 97116 GAIT TRAINING THERAPY: CPT | Mod: GP

## 2020-09-18 PROCEDURE — 85027 COMPLETE CBC AUTOMATED: CPT | Performed by: NURSE PRACTITIONER

## 2020-09-18 PROCEDURE — 25000131 ZZH RX MED GY IP 250 OP 636 PS 637: Performed by: PHYSICIAN ASSISTANT

## 2020-09-18 PROCEDURE — 25000128 H RX IP 250 OP 636: Performed by: INTERNAL MEDICINE

## 2020-09-18 RX ORDER — TACROLIMUS 1 MG/1
1 CAPSULE ORAL
Status: DISCONTINUED | OUTPATIENT
Start: 2020-09-18 | End: 2020-09-20

## 2020-09-18 RX ORDER — PIPERACILLIN SODIUM, TAZOBACTAM SODIUM 3; .375 G/15ML; G/15ML
3.38 INJECTION, POWDER, LYOPHILIZED, FOR SOLUTION INTRAVENOUS EVERY 6 HOURS
Status: COMPLETED | OUTPATIENT
Start: 2020-09-18 | End: 2020-09-22

## 2020-09-18 RX ORDER — MULTIPLE VITAMINS W/ MINERALS TAB 9MG-400MCG
1 TAB ORAL DAILY
Status: DISCONTINUED | OUTPATIENT
Start: 2020-09-19 | End: 2020-09-24 | Stop reason: HOSPADM

## 2020-09-18 RX ADMIN — MYCOPHENOLATE MOFETIL 500 MG: 500 TABLET ORAL at 17:24

## 2020-09-18 RX ADMIN — INSULIN ASPART 13 UNITS: 100 INJECTION, SOLUTION INTRAVENOUS; SUBCUTANEOUS at 09:09

## 2020-09-18 RX ADMIN — PIPERACILLIN SODIUM AND TAZOBACTAM SODIUM 2.25 G: 2; .25 INJECTION, POWDER, LYOPHILIZED, FOR SOLUTION INTRAVENOUS at 02:01

## 2020-09-18 RX ADMIN — MULTIVITAMIN 15 ML: LIQUID ORAL at 08:28

## 2020-09-18 RX ADMIN — HUMAN INSULIN 14 UNITS/HR: 100 INJECTION, SOLUTION SUBCUTANEOUS at 01:57

## 2020-09-18 RX ADMIN — HYDRALAZINE HYDROCHLORIDE 50 MG: 50 TABLET, FILM COATED ORAL at 19:57

## 2020-09-18 RX ADMIN — Medication 1 TABLET: at 17:24

## 2020-09-18 RX ADMIN — HUMAN INSULIN 8 UNITS/HR: 100 INJECTION, SOLUTION SUBCUTANEOUS at 10:01

## 2020-09-18 RX ADMIN — PIPERACILLIN SODIUM AND TAZOBACTAM SODIUM 2.25 G: 2; .25 INJECTION, POWDER, LYOPHILIZED, FOR SOLUTION INTRAVENOUS at 08:17

## 2020-09-18 RX ADMIN — PIPERACILLIN AND TAZOBACTAM 3.38 G: 3; .375 INJECTION, POWDER, FOR SOLUTION INTRAVENOUS at 13:49

## 2020-09-18 RX ADMIN — TACROLIMUS 2 MG: 1 CAPSULE ORAL at 08:17

## 2020-09-18 RX ADMIN — ROSUVASTATIN CALCIUM 10 MG: 10 TABLET, FILM COATED ORAL at 19:57

## 2020-09-18 RX ADMIN — HYDRALAZINE HYDROCHLORIDE 50 MG: 50 TABLET, FILM COATED ORAL at 08:17

## 2020-09-18 RX ADMIN — MAGNESIUM SULFATE IN WATER 2 G: 40 INJECTION, SOLUTION INTRAVENOUS at 16:25

## 2020-09-18 RX ADMIN — SODIUM PHOSPHATE, MONOBASIC, MONOHYDRATE AND SODIUM PHOSPHATE, DIBASIC, ANHYDROUS 20 MMOL: 276; 142 INJECTION, SOLUTION INTRAVENOUS at 21:20

## 2020-09-18 RX ADMIN — MYCOPHENOLATE MOFETIL 500 MG: 500 TABLET ORAL at 08:16

## 2020-09-18 RX ADMIN — Medication 1 TABLET: at 08:16

## 2020-09-18 RX ADMIN — HYDRALAZINE HYDROCHLORIDE 50 MG: 50 TABLET, FILM COATED ORAL at 13:49

## 2020-09-18 RX ADMIN — TACROLIMUS 1 MG: 1 CAPSULE ORAL at 17:24

## 2020-09-18 RX ADMIN — PIPERACILLIN AND TAZOBACTAM 3.38 G: 3; .375 INJECTION, POWDER, FOR SOLUTION INTRAVENOUS at 19:57

## 2020-09-18 ASSESSMENT — ACTIVITIES OF DAILY LIVING (ADL)
ADLS_ACUITY_SCORE: 15

## 2020-09-18 ASSESSMENT — MIFFLIN-ST. JEOR: SCORE: 1579.69

## 2020-09-18 NOTE — PLAN OF CARE
6C OT: Cancel     Multiple attempts this AM and PM, declined AM 2/2 breakfast and wanting to speak w/MD prior to participating with therapy. Pt w/PT this PM. Will cancel and reschedule for tomorrow.

## 2020-09-18 NOTE — PROGRESS NOTES
"IP Diabetes Management  Daily Note           Assessment and Plan:   HPI: Mariusz Sharp \"Red\" is a 59 year old male with history of CAD, LV thrombus, CKD Stage III, PAF, DM Type II, and ICM s/p HM III LVAD as BTT (2018) with subsequent OHT 5/18/20, presenting with progressive SOB, cough, and fever, found to have right hilar lung mass with infiltrate. S/P bronch with biopsy 9/15. Poor PO intake, and starting enteral feeds.    Assessment:   1)Type II Diabetes Mellitus,  2) Enteral feed-induced hyperglycemia     Plan:    -continuing IV insulin today; possible transition tomorrow AM   -NPH 10 units daily AM (effective basal coverage off cycled TF, and without steroids)   -PM NPH to cover cycled TF dose TBD   -increased to aspart 1:4g CHO all meals and snacks, while dexamethasone effect remains- planning reduction to 1:6g CHO with all meals and snacks starting tomorrow AM.   -BG monitoring q1-2 hours per IV insulin protocol.    -hypoglycemia protocol   -diet with carb counting protocol   -diabetes education needs to be reassessed prior to discharge.     Outpatient follow up: TBD  Plan discussed with patient.       Interval History and Assessment: interval glucose trend reviewed:   BG improving to 170-190's while NPO/TF held for procedure, 9 units of NPH given for estimated underlying basal need.     Received 6mg Dexamethasone in OR for biopsy 9/17.   Team switched to cycled TF last night; started IV insulin for anticipated significant hyperglycemia, and was requiring up to 20 units per hour at one point.     BG remaining elevated today despite increase in carb coverage and NPH given that is appropriate for previous basal requirement off TF; profound steroid effect. Will hold off on transition until tomorrow when steroid is expected to have worn off.     Current nutritional intake and type: Orders Placed This Encounter      Regular Diet Adult  continuous tube feeds: Nutren 1.5 (goal 45cc/hour, for 190g CHO daily)    PTA " Diabetes Regimen:   CGM: Dexcom G6.  Lantus 32 units daily HS  Novolog 1:8g CHO and high intensity sliding scale AC/HS.    Discharge Planning: TBD           Diabetes History:   Type of Diabetes: Type 2 Diabetes Mellitus, TPN/Enteral Feeding Induced Hyperglycemia  Lab Results   Component Value Date    A1C 7.4 08/19/2020    A1C 7.2 08/13/2020    A1C 7.8 06/15/2020    A1C 8.0 05/12/2020    A1C 9.5 07/05/2019              Review of Systems:   The Review of Systems is negative other than noted in the Interval History.           Medications:     Current Facility-Administered Medications   Medication     acetaminophen (TYLENOL) tablet 975 mg     albuterol (PROVENTIL) neb solution 2.5 mg     benzocaine-menthol (CEPACOL) 15-3.6 MG lozenge 1 lozenge     calcium carbonate 600 mg-vitamin D 400 units (CALTRATE) per tablet 1 tablet     dextrose 10% infusion     glucose gel 15-30 g    Or     dextrose 50 % injection 25-50 mL    Or     glucagon injection 1 mg     hydrALAZINE (APRESOLINE) injection 10 mg     hydrALAZINE (APRESOLINE) tablet 50 mg     insulin 1 unit/mL in saline (NovoLIN, HumuLIN Regular) drip - ADULT IV Infusion     insulin aspart (NovoLOG) injection (RAPID ACTING)     insulin aspart (NovoLOG) injection (RAPID ACTING)     insulin aspart (NovoLOG) injection (RAPID ACTING)     insulin aspart (NovoLOG) injection (RAPID ACTING)     insulin isophane human (HumuLIN N PEN) injection 10 Units     Lidocaine (LIDOCARE) 4 % Patch 1 patch    And     lidocaine patch in PLACE     lidocaine (LMX4) cream     lidocaine 1 % 0.1-1 mL     loperamide (IMODIUM) capsule 2 mg     magnesium sulfate 2 g in water intermittent infusion     magnesium sulfate 4 g in 100 mL sterile water (premade)     [START ON 9/19/2020] multivitamin w/minerals (THERA-VIT-M) tablet 1 tablet     mycophenolate (GENERIC EQUIVALENT) tablet 500 mg     naloxone (NARCAN) injection 0.1-0.4 mg     ondansetron (ZOFRAN) tablet 4 mg     oxyCODONE (ROXICODONE) tablet 5 mg      "Patient is already receiving mechanical prophylaxis     piperacillin-tazobactam (ZOSYN) 3.375 g vial to attach to  mL bag     potassium chloride (KLOR-CON) Packet 20-40 mEq     potassium chloride 10 mEq in 100 mL intermittent infusion with 10 mg lidocaine     potassium chloride 10 mEq in 100 mL sterile water intermittent infusion (premix)     potassium chloride 20 mEq in 50 mL intermittent infusion     potassium chloride ER (KLOR-CON M) CR tablet 20-40 mEq     prochlorperazine (COMPAZINE) injection 5 mg     rosuvastatin (CRESTOR) tablet 10 mg     sodium chloride (NEBUSAL) 3 % neb solution 3 mL     sodium chloride (PF) 0.9% PF flush 3 mL     sodium chloride (PF) 0.9% PF flush 3 mL     sodium phosphate 10 mmol in D5W intermittent infusion     sodium phosphate 15 mmol in D5W intermittent infusion     sodium phosphate 20 mmol in D5W intermittent infusion     sodium phosphate 25 mmol in D5W intermittent infusion     tacrolimus (GENERIC EQUIVALENT) capsule 2 mg     tacrolimus (GENERIC EQUIVALENT) capsule 2.5 mg     valGANciclovir (VALCYTE) tablet 450 mg            Physical Exam:    BP (!) 135/92 (BP Location: Left arm)   Pulse 98   Temp 97.5  F (36.4  C) (Oral)   Resp 24   Ht 1.626 m (5' 4\")   Wt 84.4 kg (186 lb)   SpO2 97%   BMI 31.93 kg/m    General: pleasant, in no distress, sitting up in chair  HEENT: normocephalic, atraumatic. Oral mucous membranes moist.   Lungs: unlabored respiration, no cough  Skin: warm and dry, no obvious lesions  MSK:  moves all extremities  Lymp:  no LE edema   Mental status:  alert, oriented to self, place, time  Psych:  affect, calm and appropriate interaction          Data:     Recent Labs   Lab 09/18/20  1501 09/18/20  1358 09/18/20  1311 09/18/20  1207 09/18/20  1113 09/18/20  0958  09/18/20  0457  09/17/20  1842  09/17/20  0440  09/16/20  1958  09/16/20  0541  09/15/20  1412   GLC  --   --   --   --   --   --   --  171*  --  289*  --  301*  --  237*  --  274*  --  261*   BGM " 132* 131* 86 105* 164* 198*   < >  --    < >  --    < >  --    < >  --    < >  --    < >  --     < > = values in this interval not displayed.     Lab Results   Component Value Date    WBC 4.0 09/18/2020    WBC 3.3 (L) 09/17/2020    WBC 3.4 (L) 09/16/2020    HGB 7.7 (L) 09/18/2020    HGB 7.9 (L) 09/17/2020    HGB 8.5 (L) 09/16/2020    HCT 25.6 (L) 09/18/2020    HCT 25.9 (L) 09/17/2020    HCT 26.4 (L) 09/16/2020    MCV 90 09/18/2020    MCV 89 09/17/2020    MCV 88 09/16/2020     09/18/2020     09/17/2020     09/16/2020     Lab Results   Component Value Date     09/18/2020     09/17/2020     09/17/2020    POTASSIUM 5.2 09/18/2020    POTASSIUM 4.8 09/17/2020    POTASSIUM 4.6 09/17/2020    CHLORIDE 107 09/18/2020    CHLORIDE 106 09/17/2020    CHLORIDE 103 09/17/2020    CO2 20 09/18/2020    CO2 21 09/17/2020    CO2 19 (L) 09/17/2020     (H) 09/18/2020     (H) 09/17/2020     (H) 09/17/2020     Lab Results   Component Value Date    BUN 66 (H) 09/18/2020    BUN 54 (H) 09/17/2020    BUN 59 (H) 09/17/2020     Lab Results   Component Value Date    TSH 3.94 06/26/2019    TSH 6.91 (H) 07/23/2018     Lab Results   Component Value Date    AST 69 (H) 09/18/2020    AST 87 (H) 09/17/2020     (H) 09/16/2020    ALT 66 09/18/2020    ALT 73 (H) 09/17/2020    ALT 79 (H) 09/16/2020    ALKPHOS 108 09/18/2020    ALKPHOS 114 09/17/2020    ALKPHOS 116 09/16/2020       Diabetes Management Team job code: [id number]  I spent a total of 35 minutes bedside and on the inpatient unit managing glycemic care. Over 50% of my time on the unit was spent counseling the patient and/or coordinating care regarding acute hyperglycemia management.  See note for details.    Juana Ly PA-C  Inpatient Diabetes Management Service  Pager [phone]

## 2020-09-18 NOTE — PLAN OF CARE
D- cough, SOB, RUL mass s/p bx 9/8  I/A- 1L O2 PRN, on RA most of shift with O2 saturations >95% on RA. VSS. SR/ST. Insulin gtt alg 4 at 2units/hr. Calorie counts. TF cycled 6pm-6am at 65ml/hr. Mag 1.9, replaced. Phos 1.5, replacement requested from pharmacy. IV abx. 1 assist.  P- Continue to monitor and notify team of changes.

## 2020-09-18 NOTE — PROGRESS NOTES
Patient returned to unit 6C following bronchoscopy at this time. Dr. Lemus paged for post-procedure orders. States none are needed and capnography monitoring can be discontinued.

## 2020-09-18 NOTE — PROGRESS NOTES
[facility]  Transplant Infectious Disease Progress Note     Patient:  Mariusz Sharp, Date of birth 1960, Medical record number [id number]  Date of Visit:  09/18/2020         Assessment and Recommendations:   Recommendations:  - Continue empiric Zosyn through 9/22/20 to complete a ten day course for a presumptive post-obstructive (or aspiration) right lung pneumonia.  - Once the Zosyn course is completed, on 9/23/20, resume cefdinir 300 mg PO BID through 10/1/20 to complete a six week (combined serial antibiotics) course as treatment for chronic prostatitis.  - Continue Valcyte prophylaxis.  - Await further results from the 9/17/20 repeat BAL and hilar lesion transbronchial biopsy.    Transplant ID will follow with you.    Edson Farrell MD  Pager [phone]    Assessment:  A 59 year old gentleman immunosuppressed (tacrolimus, MMF; initial basiliximab induction) s/p a 5/18/20 heart transplant for ischemic cardiomyopathy (following 6/18 STEMI and 12/31/18 LVAD) who also has a history of chronic kidney disease, paroxysmal atrial fibrillation, hypertension, and type 2 diabetes mellitus.  He was admitted to the [facility] 2 service on 8/31/20 with progressive dyspnea, cough, and fever, and was found to have RUL mass which subsequently increased in size (despite empiric posaconazole therapy).  Attempts at diagnosis via bronchoscopies on 9/8/20 and 9/15/20 were non-diagnostic, and he is now s/p a repeat RUL BAL and transbronchial EBUS biopsy of the mass on 9/15/20.      ID issues:    - Now-resolved fever 9/7 - 13/20, probably due to post-obstructive pneumonia:  On empiric Zosyn starting 9/13/20, his fever quickly resolved and he has remained afebrile since, implying that the diagnosis of a right post-obstructive (or aspiration) pneumonia was correct.  A ten-day course of Zosyn should suffice.  The post-obstructive pneumonia is superimposed on an idiopathic right lung hilar mass  and other subacute / chronic parenchymal lung changes.  A 9/13/20 chest CT showed increasing size of the right hilar mass/consolidation with associated narrowing of the subsegmental right upper lobe bronchioles, rasing the possibilities of both a malignancy-associated fever, an inadequately treated fungal or other infectious phlegmon, or a new post-obstructive pneumonia.  The 9/13/20 CT also showed multiple scattered pulmonary nodules and increased bibasilar groundglass and right lung tree-in-bud nodular opacities.  There was little else in the way of localizing symptoms or signs beyond the lungs (and urinary tract -- prostate) to suggest other potential fever sources elsewhere, including negative blood cultures on 9/7/20 x 2 and 9/9/20 x 2,   Most likely, the fever source remains the lungs.  A course of empiric posaconazole given from 9/3 - 13/20 had no beneficial effect on either his fevers or the size of the RUL hilar mass (despite a therapeutic posaconazole level of 2.2 on 9/13/20), so the failed antifungal was discontinued.  A 9/12/20 absolute CD4+ cell count was 359 and his 9/8/20 BAL cytopathology was negative, making PJP very unlikely (despite his mildly increased serum LDH of 228 on 9/1/20) -- empiric atovaquone was given 9/12 - 15/20 but was discontinued as useless based on the lab results.    - Cryptogenic mass versus consolidation in the right upper lobe and right hilum since 7/29/20 with narrowing of multiple right upper lobe bronchi:   The differential diagnosis has been pneumonia with reactive right hilar lymphadenopathy versus malignancy (such as lymphoma / PTLD with post-obstructive pneumonia, although his blood EBV PCR has been negative). Empiric posaconazole coverage against mold pneumonias was given 9/3 - 13/20 but the mass size was increased on the repeat 9/13/20 chest CT. The fine-needle biopsy of a bronchial lesion 9/8/20 was non-diagnostic (although a 9/8/20 rDNA PCR assay result is still  pending) as is so far a repeat 9/15/20 BAL.  Histoplasma and Blastomyces antigen assays were negative on 9/4/20.  A 9/2/20 PJP PCR of sputum was negative. An 8/31/20 Fungitell assay was low-grade stably positive at 90 but a corresponding galactomannan antigen assay was negative, as were 9/1/20 and 9/4/20 cryptococcal antigen assays.  We are presently awaiting results (culture and pathology) of the 9/15/20 transbronchial EBUS biopsies of the RUL hilar mass.    - Drug-induced, improved nausea:  He had a 9/9/20 posaconazole level of 1.6, on 300 mg daily, but his AST then sonali to 85 on 9/12/20, so the posaconazole dose was decreased to 200 mg daily on 9/13/20 and discontinued afterwards, with subsequent improvement in his nausea.    - Enlarged prostate by CT imaging in the past, combined with elevated PSA, suspected chronic prostatitis:  He has chronic symptoms of urgency and post-void dribbling.  The 8/19/2020 PSA was 10.8. UCx neg, including post-prostate massage sample obtained on 8/20/2020. WBC in UA have decreased since he started a 6-week antibiotic course of cefdinir (which was due to end 10/1/20).  On the cefdinir, he has noticed no real change to his dribbling symptom. A repeat 9/13/2020 PSA was unchanged at 8.8.  Urinalysis and urine culture on 9/14/20 were negative and a urine viral culture from then is without growth to date.    - Acute on chronic renal insufficiency:  The etiology is unclear.  Transplant Nephrology is following and consider renovascular disease.  May in part be due to tacrolimus or ATN.   He has not recently had obvious nephrotoxic antimicrobials -- Zosyn alone (in the absence of other nephrotoxins) is not generally in and of itself a cause of elevated creatinines.    A renal ultrasound was negative (except a non-obstructing stone) on 9/15/20.  The Zosyn dose has been renally adjusted.    - Diarrhea, mild, likely due to tube feeds. Stool testing was negative on 9/10/20 for enteric pathogens  "and C difficile by PCR.  A repeat C difficile PCR was negative on 9/17/20.    Old ID issues:  - Hx of Rhinovirus shedding 8/19/2020. Neg on NP swab 8/31/2020.   - Hx of Donor BCx grew S anginosus and Veillonella sp in 1/2 sets on 5/16/20 (suspected contaminant) treated with 7 days antibiotics.  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    Other ID issues:  - PCP prophylaxis: Bactrim was discontinued 5/27/20 due to high potassium levels, and nebulized pentamidine was given in its place 6/1/2020, 6/30/2020, and 7/29/2020. Breakthrough pneumocystis infections can occur when nebulized pentamidine is used, delicia in the upper lobes, so is can be misleading that he had 9/2/2020 PJP PCR of sputum neg and 9/8/2020 BAL cytology neg for PJP. Would not use dapsone as he is already anemic. Atovaquone started 9/12/2020, 750 mg BID, but since his 9/12/20 absolute CD4+ cell count was quite good at 359, that was stopped 9/15/20.  - QTc interval: 448 msec on 9/9/2020 EKG  - Viral serostatus & prophylaxis: CMV D+/R-, EBV D-/R+, HSV1?/2?, VZV +, Toxo D-/R-; Valcyte  - Immunization status: he will be due for seasonal influenza, once the vaccine is available.  - Gamma globulin status: Serum IgG was mildly low at 510 on 9/13/20 -- no repletion is presently indicated.  - Isolation status: Routine.        Interval History:   Mr. Sharp has now been consistently afebrile (T max 98.3 degrees F) since 9/13/20 evening after empiric Zosyn therapy began for an spiration or post-obstructive right pneumonia.  His peripheral WBC also decreased from the 6 - 9K range down to the 3 - 4K range which seems to be his \"normal baseline\".  (WBC is 4.0 today.)  He remains on Zosyn (since 9/13/20, preceded by cefdinir 9/1 - 12/20 which he will be on through 10/1/20 for chronic prostatitis) and Valcyte prophylaxis.  Atovaquone was discontinued 9/15/20 after a 9/12/20 absolute CD4+ cell count was resulted as " 359.  He also remains off the prior empiric antifungal therapy (posaconazole from 9/4 - 13/20) for lack of seeing evident benefit and causing nausea.  He underwent a repeat bronchoscopy with BAL and EBUS biopsies x 6 of his RUL lung mass by Interventional Pulmonology yesterday afternoon (after the prior 9/8/20 and 9/16/20 attempts were not diagnostic, with 9/8/20 biopsy of an endobronchial mass showing non-inflammatory, non-granulomatous tissue with a negative GMS stain).  The Gram stain and KOH from that biopsy are negative, and multiple tissue biopsy cultures (aerobic /  fungal / AFB / Actinomyces / Nocardia / Legionella) sent are so far without growth.  The histopathology, FACS analysis, and BAL cytopathology are pending.  9/15/20 BAL results are also unremarkable to date Including a negative aerobic culture, fungus culture, and cytopathology).  He tolerated his BAL / EBUS yesterday well without new symptoms today.  Overall, he feels somewhat strongly than last weekend (when he was still having the fevers).  His same cough persists, although it is now completely dry and thus less productive than it was last week (before the Zosyn).  Most of the time, he lacks resting dyspnea on room air, but says that he continues to have (over the past week) random unprovoked episodes of a sensation of [id number] difficulty taking breaths which spontaneously resolve after about a half minute.  He was able to walk down the reddy to the Rehab room with Physical Therapy without much exertional dyspnea two days ago, he says.  He has ongoing (presumably tube feeds induced) diarrhea which was C difficile negative on 9/17/20.  His nausea is better and he is eating a bit (882 kcal in yesterday) in addition to the tube feeds.  (His albumin is 1.8 today.)  He has ongoing unchanged fatigue, generalized weakness, but lacks new EENT symptoms, other chest pain (beyond some recent mild right axillary discomfort), rash, headache or other new  complaints today.  He otherwise feels about the same.  His left axillary discomfort is minimal.  He has no recent hematuria.  His creatinine sonali to a peak of 3.88 on 9/16/20 (versus 1.35 on 9/6/20) but has improved a bit since, at 3.50 today -- the etiology is unclear, but his tacrolimus levels have been mostly in the 7 - 10 range.  Urinalysis and urine culture on 9/14/20 were negative and a urine viral culture from then is without growth to date.  A 9/15/20 renal ultrasound was unremarkable.  A repeat 9/13/2020 PSA was unchanged at 8.8.  He lacks dysuria but has mild distal ankle edema.  A 9/12/20 absolute CD4+ cell count was 359, making PJP quite unlikely.  The 9/13/20 repeat chest CT scan showed an increased size of the right hilar mass (despite empiric posaconazole from 9/3 - 13/20 with a therapeutic posaconazole level of 2.2 on 9/13/20), prompting the need for yesterdays repeat BAL / EBUS.  Off the posaconazole, his mildly increased LFTs are normalizing.  A 9/13/20 repeat plasma CMV PCR viral load was undetectable.  Her 9/12/20 absolute CD4+ cell count was 359, so atovaquone PJP prophylaxis was discontinued.    Transplants:  5/18/2020 (Heart), Postoperative day:  123.  Coordinator Angelika Muller    Review of Systems:  CONSTITUTIONAL:  Intermittent, worsening fevers 9/7 - 13/20, now resolved.  Fatigue, anorexia, malaise, generalized weakness persist -- seem to wax and wane somewhat.  On tube feeds for malnutrition / hypoalbuminemia.  EYES: No eye pain, visual changes, or scleral icterus.  ENT:  No rhinorrhea, sinus pain, otalgia, hearing loss, tinnitus, sore throat, or oral pain.  Slight past epistaxis (now resolved).  LYMPH:  Mild distal edema.  RESPIRATORY:  Persistent cough (previously clear productive, now dry on Zosyn).  No resting dyspnea on room air except occasionally has random, brief episodes of unexpected sensation of inability to catch his breath that spontaneously resolved.  No major  exertional dyspnea, although exercise capacity is quite limited.  CARDIOVASCULAR:  S/p heart transplant.  Past sternal and recent (improving) left axillary chest discomfort.  No other chest pain, palpitations.  GASTROINTESTINAL:  Ongoing nausea -- controlled.  Diarrhea 4 - 6 quite loose stools/day.  No abdominal pain, nausea, vomiting, diarrhea or constipation.  GENITOURINARY:  Acute on chronic renal insufficiency.  Chronic prostatitis / prostatism.  Post-void dribbling.  No dysuria / hematuria.  HEME:  Chronic anemia.  No easy bruising.  ENDOCRINE:  Diabetes mellitus on insulin.  MUSCULOSKELETAL:  As above.  No myalgias / arthralgias.  SKIN:  No rash or pruritus.  NEURO:  No headache.  PSYCH:  Negative.         Current Medications & Allergies:       calcium carbonate 600 mg-vitamin D 400 units  1 tablet Oral BID w/meals     hydrALAZINE  50 mg Oral TID     insulin aspart   Subcutaneous Daily with lunch     insulin aspart   Subcutaneous QAM AC     insulin aspart   Subcutaneous Daily with supper     insulin isophane human  10 Units Subcutaneous QAM     lidocaine  1 patch Transdermal Q24h    And     lidocaine   Transdermal Q8H     multivitamins w/minerals  15 mL Per Feeding Tube Daily     mycophenolate  500 mg Oral BID IS     piperacillin-tazobactam  2.25 g Intravenous Q6H     rosuvastatin  10 mg Oral At Bedtime     sodium chloride (PF)  3 mL Intracatheter Q8H     tacrolimus  2 mg Oral QAM     tacrolimus  2.5 mg Oral QPM     valGANciclovir  450 mg Oral Every Other Day     Infusions/Drips:    dextrose       insulin (regular) 2 Units/hr (09/18/20 0809)     - MEDICATION INSTRUCTIONS -       No Known Allergies         Physical Exam:   Vitals were reviewed.  All vitals stable.  Patient Vitals for the past 24 hrs:   BP Temp Temp src Pulse Resp SpO2 Weight   09/18/20 0722 (!) 137/90 98.1  F (36.7  C) Oral 99 22 93 % --   09/18/20 0644 -- -- -- -- -- -- 84.4 kg (186 lb)   09/18/20 0359 (!) 135/92 -- -- 98 24 97 % --   09/17/20  2356 (!) 130/90 97.5  F (36.4  C) Oral 103 27 97 % --   09/17/20 1907 -- -- -- -- -- 98 % --   09/17/20 1902 119/84 97.9  F (36.6  C) Axillary 108 20 98 % --   09/17/20 1815 (!) 136/91 -- -- 109 -- 97 % --   09/17/20 1645 129/88 -- -- 103 -- -- --   09/17/20 1636 (!) 151/101 97.3  F (36.3  C) Axillary 100 24 96 % --   09/17/20 1600 119/84 97.4  F (36.3  C) Oral 98 23 97 % --   09/17/20 1545 113/86 -- -- 100 12 97 % --   09/17/20 1530 95/72 97.5  F (36.4  C) Oral 98 29 100 % --   09/17/20 1515 (!) 80/65 -- -- 100 22 99 % --   09/17/20 1510 (!) 83/57 -- -- 99 -- -- --   09/17/20 1505 (!) 84/58 96.8  F (36  C) Axillary 101 20 99 % --   09/17/20 1400 -- -- -- 102 17 97 % --   09/17/20 1345 (!) 149/107 -- -- 110 25 -- --   09/17/20 1330 (!) 150/106 -- -- 101 23 97 % --   09/17/20 1315 (!) 158/113 97.6  F (36.4  C) Oral 101 18 96 % --   09/17/20 1048 (!) 155/109 97.4  F (36.3  C) Oral 104 18 94 % --   09/17/20 0910 (!) 151/97 -- -- 104 -- 97 % 83.5 kg (184 lb 1.6 oz)     Ranges for vital signs over the past 24 hours:   Temp:  [96.8  F (36  C)-98.1  F (36.7  C)] 98.1  F (36.7  C)  Pulse:  [] 99  Resp:  [12-29] 22  BP: ()/() 137/90  SpO2:  [93 %-100 %] 93 %  Vitals:    09/17/20 0209 09/17/20 0910 09/18/20 0644   Weight: 84.1 kg (185 lb 8 oz) 83.5 kg (184 lb 1.6 oz) 84.4 kg (186 lb)     Intake/Output Summary (Last 24 hours) at 9/18/2020 0902  Last data filed at 9/18/2020 0800  Gross per 24 hour   Intake 1880.48 ml   Output 1902 ml   Net -21.52 ml     Physical Examination:  GENERAL:  Pleasant, talkative, less-fatigued-appearing 58 yo man sitting up in a chair in NAD.  HEAD:  NCAT.  EYES:  EOMI, anicteric sclerae  ENT:  No otorrhea. Right NJ feeding tube.  No supplemental oxygen.  No anterior oral lesion.  NECK:  Supple.   LUNGS:  Anterior bilaterally clear to auscultation.  CARDIOVASCULAR:  RRR, no murmur.  Healed sternotomy scar.  ABDOMEN:  Normal bowel sounds, soft, nontender.  BACK:  No CVA  tenderness.  :  No Perez.  EXTREMITIES:  Distally warm, trace bipedal edema.  SKIN:  No acute rash. PIV site lacks inflammation.  NEUROLOGIC:  Grossly nonfocal. Active x4 extremities         Laboratory Data:     Absolute CD4   Date Value Ref Range Status   09/12/2020 359 (L) 441 - 2,156 cells/uL Final     Inflammatory Markers    Recent Labs   Lab Test 08/19/20  1759  07/23/18  0645   CRP  --   --  11.0*   PSA 10.80*   < >  --     < > = values in this interval not displayed.     Immune Globulin Studies    Recent Labs   Lab Test 09/13/20  0612   *   IGM 29*   *        Metabolic Studies       Recent Labs   Lab Test 09/18/20  0457 09/17/20  1842  09/13/20  0804  09/01/20  0658 08/31/20  1817 08/31/20  1259  08/19/20  0558  06/15/20  0826    134   < >  --    < > 134  --  130*   < > 139   < > 139   POTASSIUM 5.2 4.8   < >  --    < > 3.8  --  4.0   < > 3.6   < > 4.8   CHLORIDE 107 106   < >  --    < > 104  --  100   < > 107   < > 110*   CO2 20 21   < >  --    < > 22  --  22   < > 25   < > 21   ANIONGAP 7 7   < >  --    < > 9  --  8   < > 6   < > 8   BUN 66* 54*   < >  --    < > 21  --  33*   < > 27   < > 98*   CR 3.50* 3.35*   < >  --    < > 1.38*  --  1.60*   < > 1.31*   < > 1.89*   GFRESTIMATED 18* 19*   < >  --    < > 56*  --  47*   < > 60*   < > 38*   * 289*   < >  --    < > 118*  --  186*   < > 69*   < > 120*   A1C  --   --   --   --   --   --   --   --   --  7.4*   < > 7.8*   ARLENE 8.4* 8.4*   < >  --    < > 8.5  --  9.6   < > 8.4*   < > 9.0   PHOS 1.5*  --    < >  --   --   --   --   --    < >  --    < > 4.4   MAG 1.9  --    < >  --    < > 1.4*  --   --    < >  --    < > 1.7   LACT  --   --   --  0.7  --  0.9  --  1.5  --   --    < >  --    PCAL  --   --   --   --   --   --   --  0.23  --   --    < >  --    FGTL  --   --   --   --   --   --  90  --    < >  --   --   --    CKT  --   --   --   --   --   --   --   --   --   --   --  132    < > = values in this interval not displayed.      Hepatic Studies    Recent Labs   Lab Test 09/18/20  0457 09/17/20  0440 09/16/20  0541 09/12/20  1204  09/01/20  0658   BILITOTAL 0.4 0.4 0.4  --    < > 0.9   DBIL 0.1 0.2  --   --    < >  --    ALKPHOS 108 114 116  --    < > 62   PROTTOTAL 5.8* 5.6* 5.6*  --    < > 6.1*   ALBUMIN 1.8* 1.5* 1.7*  --    < > 2.4*   AST 69* 87* 125*  --    < > 16   ALT 66 73* 79*  --    < > 11   LDH  --   --   --  380*  --  228*    < > = values in this interval not displayed.     Pancreatitis testing    Recent Labs   Lab Test 09/01/20  0658  05/18/20  0857 05/18/20  0010   AMYLASE  --   --  56 65   TRIG 143   < >  --   --     < > = values in this interval not displayed.     Gout Labs      Recent Labs   Lab Test 08/21/20  0456 07/06/19  0355 06/26/19  0925 01/23/19  1027 07/23/18  0645   URIC 5.2 5.0 5.0 4.4 4.4     Hematology Studies      Recent Labs   Lab Test 09/18/20  0457 09/17/20  0440 09/16/20  0541  09/15/20  0522 09/14/20  0546 09/13/20  0612  09/05/20  0549 09/04/20  0543   WBC 4.0 3.3* 3.4*  --  4.7 8.0 9.0   < > 5.3 3.8*   ANEU  --   --   --   --   --   --   --   --  4.1 3.1   ALYM  --   --   --   --   --   --   --   --  0.8 0.3*   MARTHA  --   --   --   --   --   --   --   --  0.3 0.2   AEOS  --   --   --   --   --   --   --   --  0.0 0.2   HGB 7.7* 7.9* 8.5*   < > 6.7* 7.2* 7.2*   < > 8.3* 8.7*   HCT 25.6* 25.9* 26.4*  --  21.9* 22.9* 23.3*   < > 26.5* 27.5*    380 404  --  413 426 432   < > 353 376    < > = values in this interval not displayed.     Clotting Studies    Recent Labs   Lab Test 08/26/20  0939 06/04/20  0900 06/01/20  0856 05/18/20  1835   INR 1.06 1.16* 1.16* 1.54*   PTT  --   --   --  31     Iron Testing    Recent Labs   Lab Test 09/18/20  0457  09/16/20  0541  06/26/19  0925  07/23/18  0645   IRON  --   --  30*  --  58  --  35   FEB  --   --  176*  --  370  --  272   IRONSAT  --   --  17  --  16  --  13*   JOSE  --   --   --   --  17*  --  646*   MCV 90   < > 88   < > 88   < > 93   B12  --   --    --   --  638  --  1,143*    < > = values in this interval not displayed.     Arterial Blood Gas Testing    Recent Labs   Lab Test 05/24/20  1137 05/21/20  0756 05/21/20  0445 05/20/20  2340  05/19/20  0943 05/19/20  0812 05/19/20  0324 05/19/20  0009  05/18/20  1835   PH  --   --   --   --   --  7.40 7.40 7.39 7.35  --  7.43   PCO2  --   --   --   --   --  27* 33* 38 39  --  33*   PO2  --   --   --   --   --  131* 124* 132* 153*  --  323*   HCO3  --   --   --   --   --  17* 21 23 21  --  22   O2PER 24.0 2L 2L 2L   < > 40 40  40 40  40 40  40   < > 100    < > = values in this interval not displayed.     Thyroid Studies     Recent Labs   Lab Test 06/26/19  0925 07/23/18  0645   TSH 3.94 6.91*   T4  --  1.05     Urine Studies     Recent Labs   Lab Test 09/16/20  1620 09/14/20 2020 08/31/20  1615 08/20/20  1630 08/18/20  1404  01/30/19  1255   URINEPH 5.5 5.5 5.5 5.5 5.5   < > 5.0   NITRITE Negative Negative Negative Negative Negative   < > Negative   LEUKEST Trace* Negative Negative Small* Moderate*   < > Negative   WBCU 5 4 1 12* 28*   < > 82*   UWBCLM  --   --   --   --   --   --  Present*    < > = values in this interval not displayed.     Medication levels    Recent Labs   Lab Test 09/17/20  0440  09/13/20  0612  05/27/20  0837   VANCOMYCIN  --   --   --   --  26.6*   PSCON  --   --  2.2   < >  --    TACROL 5.3   < > 9.3   < >  --     < > = values in this interval not displayed.     Microbiology:    Last Culture results with specimen source  Culture Micro   Date Value Ref Range Status   09/17/2020 Culture negative monitoring continues  Preliminary   09/17/2020 Culture negative monitoring continues  Preliminary   09/15/2020   Preliminary    Culture received and in progress.  Positive AFB results are called as soon as detected.    Final report to follow in 7 to 8 weeks.     09/15/2020   Preliminary    Assayed at [facility] [address] [place] [address] [phone]   09/15/2020 Culture negative  after 2 days  Preliminary   09/15/2020 No growth  Final   09/14/2020 No growth  Final   09/09/2020 No growth after 8 days  Preliminary   09/09/2020 No growth after 8 days  Preliminary   09/09/2020 No growth  Final   09/09/2020 No growth  Final   09/08/2020   Preliminary    Culture received and in progress.  Positive AFB results are called as soon as detected.    Final report to follow in 7 to 8 weeks.     09/08/2020   Preliminary    Assayed at [facility] [address] [facility], [place] [address] [phone]   09/08/2020 Culture negative after 1 week  Preliminary   09/08/2020 Light growth  Normal respiratory simona    Final   09/08/2020   Preliminary    Culture received and in progress.  Positive AFB results are called as soon as detected.    Final report to follow in 7 to 8 weeks.     09/08/2020   Preliminary    Assayed at [facility] [address] [facility], [place] [address] [phone]   09/08/2020 Culture negative after 1 week  Preliminary   09/08/2020 No growth after 9 days  Preliminary   09/08/2020 Light growth  Normal respiratory simona    Final   09/07/2020 No growth  Final   09/07/2020 No growth  Final   09/02/2020 (A)  Final    Canceled, Test credited  >10 Squamous epithelial cells/low power field indicates oral contamination. Please   recollect.     09/02/2020 Called to  Sasha Wheat RN at 0442 9/2/20 hg    Final   08/31/2020 Canceled, Test credited  Final   08/31/2020 (A)  Final    >10 Squamous epithelial cells/low power field indicates oral contamination. Please   recollect.     08/31/2020   Final    Notification of test cancellation was given to  Sasha Wheat RN @ 0133 9/1/20 TM.     08/31/2020 No growth  Final   08/31/2020 No growth  Final   08/20/2020 No growth  Final   08/20/2020 No yeast isolated  Final   08/20/2020 Light growth  Normal simona    Final   08/19/2020 No growth  Final   08/18/2020   Final    <10,000 colonies/mL  urogenital simona  Susceptibility testing not routinely done      08/18/2020 No growth  Final   08/18/2020 No growth  Final   05/21/2020 No growth  Final   05/21/2020 No growth  Final   05/18/2020 (A)  Final    On day 5, isolated in broth only:  Cutibacterium (Propionibacterium) acnes     05/18/2020   Final    Susceptibility testing of Cutibacterium is not done from this source. Our antibiogram   indicates that Cutibacterium species are susceptible to penicillin and cefotaxime, and   most are susceptible to clindamycin.  Sugar Jimenez M.D., Medical Director     05/18/2020 Culture negative after 4 weeks  Final   05/18/2020 (A)  Final    On day 4, isolated in broth only:  Cutibacterium (Propionibacterium) acnes  Susceptibility testing of Cutibacterium is not done from this source. Our antibiogram   indicates that Cutibacterium species are susceptible to penicillin and cefotaxime, and   most are susceptible to clindamycin.  Sugar Jimenez M.D., Medical Director      Specimen Description   Date Value Ref Range Status   09/17/2020 Lung Right upper lobe Lesion  Final   09/17/2020 Lung Right upper lobe Lesion  Final   09/17/2020 Lung Right upper lobe Lesion  Final   09/17/2020 Lung Right upper lobe Lesion  Final   09/17/2020 Feces  Final   09/15/2020 Bronchial  Right Upper Lobe  Final   09/15/2020 Bronchial  Right Upper Lobe  Final   09/15/2020 Bronchial  Right Upper Lobe  Final   09/15/2020 Bronchial  Right Upper Lobe  Final   09/14/2020 Unspecified Urine  Final   09/09/2020 Blood  Final   09/09/2020 Blood  Final   09/09/2020 Blood Left Hand  Final   09/09/2020 Blood Right Hand  Final   09/09/2020 Feces  Final   09/08/2020 Bronchial FINE NEEDLE ASPIRATION  Final   09/08/2020 Bronchial FINE NEEDLE ASPIRATION  Final   09/08/2020 Bronchial FINE NEEDLE ASPIRATION  Final   09/08/2020 Bronchoalveolar Lavage FINE NEEDLEL ASPIRATE  Final   09/08/2020 Bronchoalveolar Lavage FINE NEEDLE ASPIRATE  Final   09/08/2020 Bronchial lavage RIGHT LUNG  Final   09/08/2020 Bronchial lavage RIGHT  LUNG  Final   09/08/2020 Bronchial lavage Right Lung SPCEIMEN 2  Final   09/08/2020 Bronchial lavage Right Lung SPECIMEN 2  Final   09/08/2020 Bronchial lavage Right Lung SPECIMEN 2  Final   09/08/2020 Bronchial lavage Right Lung SPECIMEN 2  Final   09/07/2020 Blood Left Arm  Final   09/07/2020 Blood Left Arm  Final   09/04/2020 Blood  Final   09/02/2020 Sputum  Final   09/02/2020 Sputum Screen  Final   09/01/2020 Urine Random  Final   09/01/2020 Blood  Final   09/01/2020 Feces  Final   09/01/2020 Urine  Final   08/31/2020 Throat  Final   08/31/2020 Sputum  Final   08/31/2020 Sputum  Final   08/31/2020 Blood Left Arm  Final   08/31/2020 Right Arm  Final        Last check of C difficile  C Diff Toxin B PCR   Date Value Ref Range Status   09/17/2020 Negative NEG^Negative Final     Comment:     Negative: C. difficile target DNA sequences NOT detected, presumed negative   for C.difficile toxin B or the number of bacteria present may be below the   limit of detection for the test.  FDA approved assay performed using [employer] GeneXpert real-time PCR.  A negative result does not exclude actual disease due to C. difficile and may   be due to improper collection, handling and storage of the specimen or the   number of organisms in the specimen is below the detection limit of the assay.       Infection Studies to assess Diarrhea   Recent Labs   Lab Test 09/10/20  1415   EPSTX1 Not Detected   EPSTX2 Not Detected   EPCAMP Not Detected   EPSALM Not Detected   EPSHGL Not Detected   EPVIB Not Detected   EPROTA Not Detected   EPNORO Not Detected   EPYER Not Detected     Virology:    Respiratory virus testing    Recent Labs   Lab Test 09/14/20 2058 08/31/20  1302 08/21/20  0520 08/19/20  1900  08/18/20  1235   IFLUA  --  Not Detected  --  Not Detected   < >  --    FLUAH1  --  Not Detected  --  Not Detected   < >  --    VV1322  --  Not Detected  --  Not Detected   < >  --    FLUAH3  --  Not Detected  --  Not Detected   < >  --     IFLUB  --  Not Detected  --  Not Detected   < >  --    PIV1  --  Not Detected  --  Not Detected  --   --    PIV2  --  Not Detected  --  Not Detected  --   --    PIV3  --  Not Detected  --  Not Detected  --   --    PIV4  --  Not Detected  --  Not Detected   < >  --    RSVA  --  Not Detected  --  Not Detected   < >  --    RSVB  --  Not Detected  --  Not Detected   < >  --    HMPV  --  Not Detected  --  Not Detected  --   --    ADENOV  --  Not Detected  --  Not Detected   < >  --    CORONA  --  Not Detected  --  Not Detected   < >  --    [id number] Nasopharyngeal Nasopharyngeal Nasopharyngeal  --   --  Nasopharyngeal   SARSCOVRES NEGATIVE NEGATIVE NEGATIVE  --   --  NEGATIVE    < > = values in this interval not displayed.     Log IU/mL of CMVQNT   Date Value Ref Range Status   09/13/2020 Not Calculated <2.1 [Log_IU]/mL Final   09/01/2020 Not Calculated <2.1 [Log_IU]/mL Final   08/19/2020 Not Calculated <2.1 [Log_IU]/mL Final   08/13/2020 Not Calculated <2.1 [Log_IU]/mL Final   06/15/2020 Not Calculated <2.1 [Log_IU]/mL Final     EBV DNA Copies/mL   Date Value Ref Range Status   09/01/2020 EBV DNA Not Detected EBVNEG^EBV DNA Not Detected [Copies]/mL Final   08/13/2020 EBV DNA Not Detected EBVNEG^EBV DNA Not Detected [Copies]/mL Final   06/15/2020 EBV DNA Not Detected EBVNEG^EBV DNA Not Detected [Copies]/mL Final     Imaging:    Recent Results (from the past 48 hour(s))   XR Chest Port 1 View    Narrative    Exam: XR CHEST PORT 1 VW, 9/17/2020 3:24 PM    Indication: Evaluate for pneumothorax after lung biopsy    Comparison: 9/13/2020    Findings:   Feeding tube is seen coursing through the mediastinum. Tip projects  off the film. Heart and pulmonary vasculature within normal limits.  Patchy opacity right upper lobe and over the right lower lobe. No  pneumothorax is identified. Catheter projecting over the left chest.  Likely retained piece of catheter.      Impression    Impression:   New opacities in the right upper  and right lower lung suggesting areas  of atelectasis or hemorrhage, postbiopsy. No pneumothorax.    CALEB SHETTY MD         9/17 CXR (post-biopsy):  New opacities in the right upper and right lower lung suggesting areas of atelectasis or hemorrhage, postbiopsy. No pneumothorax.  9/15 Renal U/S:  Re-demonstrated nonobstructing right renal calculus. Otherwise  unremarkable appearance of the kidneys. No hydronephrosis.  9/14 ABX:  NJ tube placed.  9/13 Chest CT:  1. Increased size of the right hilar mass/consolidation with associated narrowing of the subsegmental right upper lobe bronchioles and multiple scattered pulmonary nodules as above. Differential continues to include infection versus malignancy with postobstructive pneumonia.  2. Increased bibasilar groundglass and right lung tree-in-bud nodular opacities suggestive of infection, possibly secondary to aspiration.  Small right pleural effusion.  3. Surgical changes of cardiac transplantation.  4. Cholelithiasis without cholecystitis.  9/7 CXR:  No acute cardiopulmonary findings.  Less prominent right superior hilar opacity.  9/2/20 Chest CT:  . New mass vs consolidation in the right upper lobe and right hilum since 7/29/2020 with narrowing of multiple right upper lobe bronchi.  1a. Differential pneumonia with reactive right hilar lymphadenopathy vs. Malignancy (ex. Lymphoma / PTLD) with post obstructive pneumonia.  2. Stable additional pulmonary lesions as above.  3. Stable postsurgical changes of heart transplant.  4. Cholelithiasis without evidence of cholecystitis.  9/1 CXR:  Right superior hilar opacity, indeterminate. Possibly infection, adenopathy, or other. PA and lateral chest xray or CT.  8/31 Renal U/S:  1. Small echogenic foci in the upper pole of the right kidney and midpole the left kidney without posterior acoustic shadowing and no correlate on 6/2/2020 CT are unlikely to represent stones, potentially representing invagination of perinephric fat  and extension of renal sinus fat, respectively, or other reflective interfaces of doubtful clinical significance. No appreciable urinary tract stone. The large left renal pelvis stone previously seen on 6/2/2020 CT is not visualized.  2. No hydronephrosis.  8/31 Head CT:  No acute intracranial pathology.  8/31 CXR:  No acute airspace disease.

## 2020-09-18 NOTE — PLAN OF CARE
[id number]  D: Patient admitted 8/31/20 with progressive SOB, cough, and fever, found to have RUL mass s/p biopsy on 9/8.      I/A: Monitored vitals and assessed patient status. A0x4. New onset pain/tenderness at left armpit. Very sensitive to touch. No redness or rash noted. Cards 2 NP aware. Will continue to monitor. Hypertensive this 's/100's. Primary team notified and increased Hydralazine dose to 50 mg TID. Sinus tach 100's. Afebrile. Urinating adequately. C.dif sample sent and is negative. Insulin gtt initiated per order. Tube feeding orders changed from continuous to cycled [id number] at 65 ml/hr.      Completed: Bronchoscopy  PRN: Tylenol     P: Continue to monitor patient status and report changes to treatment team.

## 2020-09-18 NOTE — PLAN OF CARE
6C PT  Discharge Planner PT   Patient plan for discharge: Home  Current status: Pt independent with transfers, ambulates >2000ft with SBA progressing to independent, no balance concerns noted. Pt dyspneic with prolonged activity, SpO2 94% and greater on RA.  Barriers to return to prior living situation: Medical status, decreased activity tolerance  Recommendations for discharge: Home  Rationale for recommendations: Pt making steady progress in regards to functional mobility, nearing return to baseline independence.        Entered by: Debbie Diaz 09/18/2020 3:05 PM

## 2020-09-18 NOTE — PLAN OF CARE
"AOx4. Afebrile. VSS on 1lpm nc over night. Persistent dry cough continues. Pt had bronch yesterday. Denies pain/shortness of breath. Sinus rhythm/tach on monitor. BP slightly hypertensive with SBP in 130s. Voiding adequately. Regular diet. Tube feeding cycled 6p - 6a at 65 mL/hr. Blood sugars managed on insulin gtt algorithm 4. Throughout this shift trended down from 328 to 125. Slept intermittently between cares. Up with assist of 1.     BP (!) 137/90 (BP Location: Left arm, Cuff Size: Adult Regular)   Pulse 99   Temp 98.1  F (36.7  C) (Oral)   Resp 22   Ht 1.626 m (5' 4\")   Wt 84.4 kg (186 lb)   SpO2 93%   BMI 31.93 kg/m        "

## 2020-09-18 NOTE — PROGRESS NOTES
[facility]   Cardiology II Service / Advanced Heart Failure  Daily Progress Note      Patient: Mariusz Sharp  MRN: [id number]  Admission Date: 8/31/2020  Hospital Day # 18    Assessment and Plan: Mariusz Sharp is a 59 year old male with PMH of CAD, LV thrombus, CKD Stage III, PAF, DM Type II, and ICM s/p HM III LVAD as BTT with subsequent OHT 5/18/20. He presents with progressive SOB, cough, and fever.      Plan today:  - Continue to monitor all micro  - Increased hydralazine 9/17  - Can use PO or IV hydralazine if needed for breathrough, ordered. Please avoid labetaolol given h/o OHT.  - Continue holding aspirin and subQ heparin d/t hematuria, improving  - Continue current antibtioic: Zosyn renally dosed  - Decreased tacrolimus 9/18 due to rapid increase in level. Will recheck Sunday  - Cycle tube feeds to encourage more PO intake  - Insulin gtt per endocrine, continue carb coverage at meals    #Fever with Cough secondary to RUL mass complicated by Postobstructive Pneumoania.   Pertinent recent infections on Completed Fever Work-up  - Appreciate Transplant ID and IR Pulm consults  - History of rhinovirus- positive 8/19, negative since  - History of P. acnes to outflow graft and Donor positive S Anginosus and Veillonella (completed course of antibiotics).  - CDIF, LA, UA, COVID, Legionella, Cryptococcus, Strep pneumo negative, Aspergillus, RSV, CMV, and EBV negative  - IGM-29, IGG-510, IGA-108, CMV negative, annd UA negative. IGE, and UC negative  - 8/31 CT Chest positive for RUL mass concerning for PTLD vs infection.   - 9/8  EBUS with BAL with negative cytology and PJP. LDH-380. Absolute CD4-359. Unclear if there was adequate tissue sampling. Micro has been negative.  - 9/8 EBUS histopathy non-diagnostic   - 9/8 FNA negative for malignancy  - 9/8 cystology: rare pseudohyphae. Negative for malignancy. Birla cytopathic change absent.  - 9/8 EBUS- ? Of hyphae, initially covered fungal source, now off  antifungals  - 9/9 blood cultures NGTD  - 9/13 Chest CT consistent with increased RUL mass with progressive post obstructive PNA.  - 9/17 repeat Bronchoscopy with EBUS- no visualization of the endobronchial mass but tissue obtained from the area of the RUQ mass    Pending Fever Work-up  - 9/8 BAL AFB- NGTD  - 9/8 BAL Bacterial culture - respiratory simona thus far  - 9/8 BAL Fungal Culture NGTD  - 9/17 BAL AFB NGTD  - 9/17 BAL fungal Culture NGTD  - 9/17 BAL Bacterial culture NGTD  - 9/17 acrinomyces, legionella, nocarda r/o- NGTD  - 9/17 FNAand cytology pending  - Per Pulm, recommend awaiting growth from 9/17 biopsies before referring to thoracics as this is going to be a difficult site for them to access.  - 9/14/2020 Viral respiratory culture NGTD    Antibiotic summary  - On vanc zosyn from 8/31 to 9/1  - Cefdnir added 9/1, stopped 9/13  - Posaconazole 9/4- 9/13  - Atovaquone 9/12-9/15  - Zosyn restarted 9/13 (when cefdinir and posaconazole were stopped)  - NS and Albuterol nebs for clearance.     Plan  - Continue to monitor cultures, if remain negative and treatment plan unclear could continue thoracic  - Continue empiric Zosyn through 9/22/20 to complete a ten day course for a presumptive post-obstructive (or aspiration) right lung pneumonia.  - Once the Zosyn course is completed, on 9/23/20, resume cefdinir 300 mg PO BID through 10/1/20 to complete a six week (combined serial antibiotics) course as treatment for chronic prostatitis.     Diarrhea, Nausea, and Vomiting. CDIF negative and CMV negative. Repeat stool cultures negative.  Nausea improving. Decreasing BM frequency.  - Compazine prn nausea.      S/p OHT secondary to ICM. 5/18/20. Echo 8/31 with EF 70% and normal graft function.   RHC 8/26 with mRA-3, RV-20/2, mPA-15, mPCWP-10, AMRIT CO-4.91, AMRIT CI-2.63, biopsy negative. Immuknow 33 9/3 with reduction in Cellcept above.   Immunosuppression: Simulect 5/18 and 5/22. Tac 0.5 mg po BID, Tac level today  pending. Goal-8-10. Cellcept 500 mg po BID (decreased d/t 9/3 Immuknow and infection), and Prednisone completed 8/27/20.   Serostatus: CMV: D+, R-, EBV: D- R+, Toxo D- R-  Prophylaxis: Valcyte. Atovaquone discontinued per ID as above.   - Continue Crestor.   - ASA held in setting of hematuria.   Monitoring:  - Next EMB due 9/30, recheck Immuknow at this time as well.   - EBV not detected 9/1, check per protocol  - CMA not detected 9/13, check per protocol     HTN. Prior to admission Norvasc on  Hold.   - Increase hydralazine 50 TID, IV PRN while NPO    ANDREW on CKD Stage III. Cr prior to admission 1.3-1.7. UA consistent with 30 protein, 53 RBC, and moderate blood. Multiple renal ultrasounds with non-obstructive stone and no hydronephrosis. FeNA suggests intren  - Appreciated nephrology consult  - Recheck Tac Sunday, decreased 9/18 due to rapid rate of increase  - Encourage po. Continue free water flushes to 80 ml q4h  - Q12 hour BMP     DM Type II.   - Management per Endo appreciated.   - Insulin gtt 9/17-9/18 due to steroids in or with bronch, plan to switch to NPH today    Hypophosphatemia Rapidly decreasing phos, although I have lower suspicion for refeeding syndrome given elevated K and normal Mag. Will hold off on thiamine today.   - Replacement protocol  - Continue to monitor     Elevated PSA with Concern for Chronic Prostatitis. PSA-8.21. MRI consistent with BPH.   - Cefdinir discontinued, Zosyn initiated for post obstructive CAP as above.   - Hematuria per UA, resolved once ASA was held.      Protein Calore Malnutrition. Decreased po intake with need for fluid resuscitation. Weight down 5 lbs from admission. Albumin 1.9 Prealbumin 4.  - Appreciate Nutrition consult.    - Switched to cycled tube feed to encouraged improved appetite  - Continue calorie counts    Anemia. Baseline Hgb 7-8. 9/16 iron saturation WNL.  - 1 unit PRBC 9/16 for Hgb 6.7  - ASA held SubQ Heparin discontinued.   - UA with moderate  "hematuria initially, resolved since stopping antiplatelet/anticoagulation      FEN: Moderate Carb diet   PROPHY: Heparin subcutaneous discontinued with drop in Hgb, ambulatory  LINES: PIV  DISPO: TBD. Therapy recommending TCU at this time  CODE STATUS: Full Code   ================================================================  Interval History/ROS: Feeling much better today. Still singificant cough. Not bringing much up. No fevers or chills. No lightheadedness or dizziness. His cp from yesterday night and second kind of chest pain from yesterday are fully resolved. Does not feel that his abdomen or hands are swollen but he does have some swelling in his feet which is increased from his baseline. No nausea or vomiting. His soft bms are improving, not diarrhea, only had 2 small BMs overnight. He finally has an appetite today. His urine has increased and is almost back to normal. No more blood in the urine. Overall feeling much better and optomistic today.    Last 24 hr care team notes reviewed.   ROS:  4 point ROS including Respiratory, CV, GI and , other than that noted in the HPI, is negative.     Medications: Reviewed in EPIC.     Physical Exam:   /89 (BP Location: Left arm, Cuff Size: Adult Regular)   Pulse 100   Temp 97.7  F (36.5  C) (Oral)   Resp 20   Ht 1.626 m (5' 4\")   Wt 84.4 kg (186 lb)   SpO2 95%   BMI 31.93 kg/m    GENERAL: Appears alert and interacting appropriatly. Speaking in full sentances and able to communicate all needs.  HEENT: Eye symmetrical and free of discharge bilaterally. Mucous membranes moist and without lesions.  NECK: Supple and without lymphadenopathy. JVD lower third of neck at 75 degrees  CV: RRR, S1S2 present without murmur, rub, or gallop.   RESPIRATORY: Respirations regular, even, and unlabored. Lungs CTA throughout.   GI: Soft and non distended with normoactive bowel sounds present in all quadrants. No tenderness, rebound, guarding. No organomegaly. "   EXTREMITIES: Moderate non-pitting edema b/l bilateral LE peripheral edema. 2+ bilateral pedal pulses.   NEUROLOGIC: Alert and interacting appropriately. No focal deficits.   MUSCULOSKELETAL: No joint swelling.  SKIN: No jaundice. No rashes or lesions.     Data:  CMP  Recent Labs   Lab 09/18/20  0457 09/17/20  1842 09/17/20  0440 09/16/20 1958 09/16/20  0541  09/15/20  0522  09/12/20  0545    134 133 133 131*   < > 131*   < > 128*   POTASSIUM 5.2 4.8 4.6 4.6 4.6   < > 4.0   < > 4.1   CHLORIDE 107 106 103 102 99   < > 100   < > 98   CO2 20 21 19* 23 21   < > 21   < > 22   ANIONGAP 7 7 12 9 11   < > 10   < > 8   * 289* 301* 237* 274*   < > 240*   < > 201*   BUN 66* 54* 59* 54* 54*   < > 40*   < > 31*   CR 3.50* 3.35* 3.75* 3.88* 3.68*   < > 2.73*   < > 1.82*   GFRESTIMATED 18* 19* 17* 16* 17*   < > 25*   < > 40*   GFRESTBLACK 21* 22* 19* 19* 20*   < > 28*   < > 46*   ARLENE 8.4* 8.4* 8.0* 8.3* 8.2*   < > 8.3*   < > 8.9   MAG 1.9  --  1.9  --  2.3  --  2.2   < > 2.2   PHOS 1.5*  --  2.0*  --  3.0  --  4.3   < >  --    PROTTOTAL 5.8*  --  5.6*  --  5.6*  --   --   --  6.2*   ALBUMIN 1.8*  --  1.5*  --  1.7*  --   --   --  1.9*   BILITOTAL 0.4  --  0.4  --  0.4  --   --   --  0.6   ALKPHOS 108  --  114  --  116  --   --   --  102   AST 69*  --  87*  --  125*  --   --   --  85*   ALT 66  --  73*  --  79*  --   --   --  47    < > = values in this interval not displayed.     CBC  Recent Labs   Lab 09/18/20  0457 09/17/20  0440 09/16/20  0541 09/15/20  1827  09/15/20  0522   WBC 4.0 3.3* 3.4*  --   --  4.7   RBC 2.85* 2.91* 3.01*  --   --  2.48*   HGB 7.7* 7.9* 8.5* 8.0*   < > 6.7*   HCT 25.6* 25.9* 26.4*  --   --  21.9*   MCV 90 89 88  --   --  88   MCH 27.0 27.1 28.2  --   --  27.0   MCHC 30.1* 30.5* 32.2  --   --  30.6*   RDW 13.6 13.6 13.7  --   --  13.6    380 404  --   --  413    < > = values in this interval not displayed.     Patient discussed with Dr. Chase Lindsay, PA-C  [facility]  Cardiology

## 2020-09-18 NOTE — PROGRESS NOTES
"CLINICAL NUTRITION SERVICES     Nutrition Prescription    RECOMMENDATIONS FOR MDs/PROVIDERS TO ORDER:  1. Monitor lytes (Phos, Mg++, and K+) for refeeding syndrome. If lytes trend low, aggressively replace. Phos of 1.5 today (9/18). Consider intensifying the Rx Electrolyte Replacement Management Adult order set to \"high replacement\" for NaPhos. Consider thiamine, 100 mg/day x 5-7 days, if suspect refeeding syndrome. Could check vitamin D status.   2. If/when pt is consuming greater than 1100 kcals and 42 g protein daily on average, then discontinue TFs. TFs may be adjusted, pending oral intake. Notify Endo team before making any change to TFs.   3. Consider scheduled Imodium.   4. See previous RD notes for additional details.     Malnutrition Status:    See previous RD notes    Recommendations already ordered by Registered Dietitian (RD):  Extended kcal counts  Modified multivitamin/minerals    Future/Additional Recommendations:  1. If K+ trends high, consider changing TFs to Nepro (lower in K+) with regimen of Nepro at 50 mL/hr x 12 hrs which provides 600 mL TF, 1080 kcals, 49 g protein, and 97 g CHO. Notify Endo team if making any changes to TFs.  2. See RD notes for all details.     Checking nutrition support, kcal counts    Diet: Regular diet. Has orders for orange or berry Boost Breeze with all meals and Gelatein 20 (between meals). Note, pt consumed 903 kcals and 38 g protein per kcal counts 9/16 and 882 kcals and 36 g protein on 9/17.   TFs (via NDT): Nutren 1.5 at 65 mL/hr x 12 hrs (6p-6a) provides 780 mL TF, 1170 kcals, 53 g protein, 137 g CHO, 593 mL H2O, and no fiber daily. Regimen provides about 75% of kcal needs.     INTERVENTIONS:  Implementation:  Collaboration with other providers: Paged provider regarding recs for MDs/providers to order #1.   Modified multivitamin/minerals: Modified certavite to a multivitamin with minerals  Extended kcal counts through Mon 9/21 pm.     Follow up/Monitoring:  Will " continue to follow pt.    Dorcas Meek, MS, RD, LD, CNSC   6C Pgr: [phone]

## 2020-09-18 NOTE — PROGRESS NOTES
Calorie Count  Intake recorded for: 9/17  Total Kcals: 882 Total Protein: 36g  Kcals from Hospital Food: 882  Protein: 36g  Kcals from Outside Food (average):0 Protein: 0g  # Meals Recorded: 100% chicken noodle soup w/ saltines, gardens salad w/ [place], pineapple, whole milk, 30% hot turkey sandwich w/ gravy  # Supplements Recorded: 100% 1 Boost Breeze

## 2020-09-18 NOTE — PROGRESS NOTES
Nephrology Progress Note  09/18/2020         Assessment & Recommendations:   Mariusz Sharp is a 59 year old male with PMH of ICM s/p HM III LVAD as BTT with subsequent OHT 5/18/20, LV thrombus, DM Type II, . He presents with progressive SOB, cough, and fever and diarrhea.     #ANDREW on CKD III, non oliguric  Patients baseline Scr is likey ~1.3-1.6 with gradual rise to 3.6 from 9/9-9/16. He's had occasional loose stools, but no obvious acute renal insult in previous 10 days. Patient has normal cardiac function on echo (8/31). His tacrolimus levels have been mostly stable/in target range, though he did have some supratherapeutic levels ~9/6-9/8.  Renal US with non-obstructing stone, no hydro, no mass. Urine sodium 52 and FeNa 2.5% suggestive of intrinsic renal process. Suspect he may have developed some ATN due to combination of mild volume depletion and tacrolimus. He may just be more sensitive to the renal vasoconstrictive effects of tacrolimus due to likely underlying renovascular disease. Would recommend avoiding volume depletion as able.  - follow BMP  - avoid nephrotoxins  - renally dose medications  - follow Is/Os     Recommendations were communicated to primary team via note     Seen and discussed with Dr. Marcio Nugent MD  Nephrology Fellow  [phone]      Interval History :   Nursing and provider notes from last 24 hours reviewed.  Feels better today. Occasionally having a sensation where it's difficult to catch his breath, but otherwise feels well. Some pedal edema, unchanged from yesterday. Had 2 loose stools overnight.     Review of Systems:   I reviewed the following systems:  GI: okay appetite. no nausea or vomiting. Increased loose stools.   Neuro:  no confusion  Constitutional:  no fever or chills  CV: no dyspnea, stable mild edema. No chest pain.    Physical Exam:   I/O last 3 completed shifts:  In: 1993.48 [P.O.:480; I.V.:818.48; NG/GT:110]  Out: 1952 [Urine:1950; Blood:2]   /89  "(BP Location: Left arm, Cuff Size: Adult Regular)   Pulse 100   Temp 97.7  F (36.5  C) (Oral)   Resp 20   Ht 1.626 m (5' 4\")   Wt 84.4 kg (186 lb)   SpO2 95%   BMI 31.93 kg/m       GENERAL APPEARANCE: comfortable, NAD  EYES:  no scleral icterus, pupils equal  PULM: lungs clear to auscultation bilaterally, no increased WOB  CV: RRR     -1+ bilateral pedal edema   GI: soft, nontender, nondistended  INTEGUMENT: no cyanosis, no rash  NEURO:  AOx3     Labs:   All labs reviewed by me  Electrolytes/Renal -   Recent Labs   Lab Test 09/18/20 0457 09/17/20  1842 09/17/20 0440 09/16/20  0541    134 133   < > 131*   POTASSIUM 5.2 4.8 4.6   < > 4.6   CHLORIDE 107 106 103   < > 99   CO2 20 21 19*   < > 21   BUN 66* 54* 59*   < > 54*   CR 3.50* 3.35* 3.75*   < > 3.68*   * 289* 301*   < > 274*   ARLENE 8.4* 8.4* 8.0*   < > 8.2*   MAG 1.9  --  1.9  --  2.3   PHOS 1.5*  --  2.0*  --  3.0    < > = values in this interval not displayed.       CBC -   Recent Labs   Lab Test 09/18/20 0457 09/17/20 0440 09/16/20  0541   WBC 4.0 3.3* 3.4*   HGB 7.7* 7.9* 8.5*    380 404       LFTs -   Recent Labs   Lab Test 09/18/20 0457 09/17/20  0440 09/16/20  0541   ALKPHOS 108 114 116   BILITOTAL 0.4 0.4 0.4   ALT 66 73* 79*   AST 69* 87* 125*   PROTTOTAL 5.8* 5.6* 5.6*   ALBUMIN 1.8* 1.5* 1.7*       Iron Panel -   Recent Labs   Lab Test 09/16/20  0541 06/26/19  0925 07/23/18  0645   IRON 30* 58 35   IRONSAT 17 16 13*   JOSE  --  17* 646*         Imaging:  Recent imaging reviewed.     Current Medications:    calcium carbonate 600 mg-vitamin D 400 units  1 tablet Oral BID w/meals     hydrALAZINE  50 mg Oral TID     insulin aspart   Subcutaneous Daily with lunch     insulin aspart   Subcutaneous QAM AC     insulin aspart   Subcutaneous Daily with supper     insulin isophane human  10 Units Subcutaneous QAM     lidocaine  1 patch Transdermal Q24h    And     lidocaine   Transdermal Q8H     [START ON 9/19/2020] multivitamin " w/minerals  1 tablet Oral Daily     mycophenolate  500 mg Oral BID IS     piperacillin-tazobactam  3.375 g Intravenous Q6H     rosuvastatin  10 mg Oral At Bedtime     sodium chloride (PF)  3 mL Intracatheter Q8H     tacrolimus  2 mg Oral QAM     tacrolimus  2.5 mg Oral QPM     valGANciclovir  450 mg Oral Every Other Day       dextrose       insulin (regular) 6 Units/hr (09/18/20 1125)     - MEDICATION INSTRUCTIONS -       Lin Nugent MD

## 2020-09-18 NOTE — PROGRESS NOTES
Prior Authorization Approval    posaconazole 100mg tabs  Date Initiated: 9/4/2020  Date Completed: 9/4/2020  Prior Auth Type: Clinical                Status: Approved    Effective Date: 08/05/2020 - 09/04/2021    Insurance: [employer] - Phone [phone] Fax [phone]  ID: [id number]  Case Number: [id number]   Submitted Via: Brielle Dudley  [facility] Liaison  Ph: [phone] Page: [phone]

## 2020-09-19 LAB
ALBUMIN SERPL-MCNC: 1.9 G/DL (ref 3.4–5)
ALP SERPL-CCNC: 95 U/L (ref 40–150)
ALT SERPL W P-5'-P-CCNC: 74 U/L (ref 0–70)
ANION GAP SERPL CALCULATED.3IONS-SCNC: 7 MMOL/L (ref 3–14)
AST SERPL W P-5'-P-CCNC: 87 U/L (ref 0–45)
BILIRUB DIRECT SERPL-MCNC: 0.1 MG/DL (ref 0–0.2)
BILIRUB SERPL-MCNC: 0.3 MG/DL (ref 0.2–1.3)
BUN SERPL-MCNC: 57 MG/DL (ref 7–30)
CALCIUM SERPL-MCNC: 8.4 MG/DL (ref 8.5–10.1)
CHLORIDE SERPL-SCNC: 108 MMOL/L (ref 94–109)
CO2 SERPL-SCNC: 21 MMOL/L (ref 20–32)
CREAT SERPL-MCNC: 2.94 MG/DL (ref 0.66–1.25)
ERYTHROCYTE [DISTWIDTH] IN BLOOD BY AUTOMATED COUNT: 14.1 % (ref 10–15)
GFR SERPL CREATININE-BSD FRML MDRD: 22 ML/MIN/{1.73_M2}
GLUCOSE BLDC GLUCOMTR-MCNC: 118 MG/DL (ref 70–99)
GLUCOSE BLDC GLUCOMTR-MCNC: 124 MG/DL (ref 70–99)
GLUCOSE BLDC GLUCOMTR-MCNC: 130 MG/DL (ref 70–99)
GLUCOSE BLDC GLUCOMTR-MCNC: 132 MG/DL (ref 70–99)
GLUCOSE BLDC GLUCOMTR-MCNC: 138 MG/DL (ref 70–99)
GLUCOSE BLDC GLUCOMTR-MCNC: 141 MG/DL (ref 70–99)
GLUCOSE BLDC GLUCOMTR-MCNC: 150 MG/DL (ref 70–99)
GLUCOSE BLDC GLUCOMTR-MCNC: 151 MG/DL (ref 70–99)
GLUCOSE BLDC GLUCOMTR-MCNC: 155 MG/DL (ref 70–99)
GLUCOSE BLDC GLUCOMTR-MCNC: 156 MG/DL (ref 70–99)
GLUCOSE BLDC GLUCOMTR-MCNC: 164 MG/DL (ref 70–99)
GLUCOSE BLDC GLUCOMTR-MCNC: 165 MG/DL (ref 70–99)
GLUCOSE BLDC GLUCOMTR-MCNC: 168 MG/DL (ref 70–99)
GLUCOSE BLDC GLUCOMTR-MCNC: 177 MG/DL (ref 70–99)
GLUCOSE BLDC GLUCOMTR-MCNC: 185 MG/DL (ref 70–99)
GLUCOSE BLDC GLUCOMTR-MCNC: 219 MG/DL (ref 70–99)
GLUCOSE BLDC GLUCOMTR-MCNC: 240 MG/DL (ref 70–99)
GLUCOSE BLDC GLUCOMTR-MCNC: 272 MG/DL (ref 70–99)
GLUCOSE SERPL-MCNC: 171 MG/DL (ref 70–99)
HCT VFR BLD AUTO: 26.5 % (ref 40–53)
HGB BLD-MCNC: 7.8 G/DL (ref 13.3–17.7)
MAGNESIUM SERPL-MCNC: 1.9 MG/DL (ref 1.6–2.3)
MCH RBC QN AUTO: 26.9 PG (ref 26.5–33)
MCHC RBC AUTO-ENTMCNC: 29.4 G/DL (ref 31.5–36.5)
MCV RBC AUTO: 91 FL (ref 78–100)
PHOSPHATE SERPL-MCNC: 2.9 MG/DL (ref 2.5–4.5)
PLATELET # BLD AUTO: 411 10E9/L (ref 150–450)
POTASSIUM SERPL-SCNC: 5.1 MMOL/L (ref 3.4–5.3)
PROT SERPL-MCNC: 5.6 G/DL (ref 6.8–8.8)
RBC # BLD AUTO: 2.9 10E12/L (ref 4.4–5.9)
SODIUM SERPL-SCNC: 135 MMOL/L (ref 133–144)
WBC # BLD AUTO: 5.1 10E9/L (ref 4–11)

## 2020-09-19 PROCEDURE — 25000128 H RX IP 250 OP 636: Performed by: INTERNAL MEDICINE

## 2020-09-19 PROCEDURE — 25000132 ZZH RX MED GY IP 250 OP 250 PS 637: Performed by: STUDENT IN AN ORGANIZED HEALTH CARE EDUCATION/TRAINING PROGRAM

## 2020-09-19 PROCEDURE — 84100 ASSAY OF PHOSPHORUS: CPT | Performed by: NURSE PRACTITIONER

## 2020-09-19 PROCEDURE — 21400000 ZZH R&B CCU UMMC

## 2020-09-19 PROCEDURE — 25000128 H RX IP 250 OP 636: Performed by: NURSE PRACTITIONER

## 2020-09-19 PROCEDURE — 36415 COLL VENOUS BLD VENIPUNCTURE: CPT | Performed by: NURSE PRACTITIONER

## 2020-09-19 PROCEDURE — 25000131 ZZH RX MED GY IP 250 OP 636 PS 637: Performed by: INTERNAL MEDICINE

## 2020-09-19 PROCEDURE — 25000132 ZZH RX MED GY IP 250 OP 250 PS 637: Performed by: PHYSICIAN ASSISTANT

## 2020-09-19 PROCEDURE — 25000125 ZZHC RX 250: Performed by: PHYSICIAN ASSISTANT

## 2020-09-19 PROCEDURE — 27210429 ZZH NUTRITION PRODUCT INTERMEDIATE LITER

## 2020-09-19 PROCEDURE — 85027 COMPLETE CBC AUTOMATED: CPT | Performed by: NURSE PRACTITIONER

## 2020-09-19 PROCEDURE — 83735 ASSAY OF MAGNESIUM: CPT | Performed by: NURSE PRACTITIONER

## 2020-09-19 PROCEDURE — 25000131 ZZH RX MED GY IP 250 OP 636 PS 637: Performed by: NURSE PRACTITIONER

## 2020-09-19 PROCEDURE — 25000132 ZZH RX MED GY IP 250 OP 250 PS 637: Performed by: INTERNAL MEDICINE

## 2020-09-19 PROCEDURE — 25000131 ZZH RX MED GY IP 250 OP 636 PS 637: Performed by: PHYSICIAN ASSISTANT

## 2020-09-19 PROCEDURE — 80076 HEPATIC FUNCTION PANEL: CPT | Performed by: NURSE PRACTITIONER

## 2020-09-19 PROCEDURE — 80048 BASIC METABOLIC PNL TOTAL CA: CPT | Performed by: NURSE PRACTITIONER

## 2020-09-19 PROCEDURE — 99232 SBSQ HOSP IP/OBS MODERATE 35: CPT | Performed by: NURSE PRACTITIONER

## 2020-09-19 PROCEDURE — 00000146 ZZHCL STATISTIC GLUCOSE BY METER IP

## 2020-09-19 PROCEDURE — 25000132 ZZH RX MED GY IP 250 OP 250 PS 637: Performed by: NURSE PRACTITIONER

## 2020-09-19 RX ORDER — DEXTROSE MONOHYDRATE 25 G/50ML
25-50 INJECTION, SOLUTION INTRAVENOUS
Status: DISCONTINUED | OUTPATIENT
Start: 2020-09-19 | End: 2020-09-24 | Stop reason: HOSPADM

## 2020-09-19 RX ORDER — NICOTINE POLACRILEX 4 MG
15-30 LOZENGE BUCCAL
Status: DISCONTINUED | OUTPATIENT
Start: 2020-09-19 | End: 2020-09-24 | Stop reason: HOSPADM

## 2020-09-19 RX ADMIN — ROSUVASTATIN CALCIUM 10 MG: 10 TABLET, FILM COATED ORAL at 20:44

## 2020-09-19 RX ADMIN — TACROLIMUS 1 MG: 1 CAPSULE ORAL at 18:22

## 2020-09-19 RX ADMIN — MAGNESIUM SULFATE IN WATER 2 G: 40 INJECTION, SOLUTION INTRAVENOUS at 12:01

## 2020-09-19 RX ADMIN — PIPERACILLIN AND TAZOBACTAM 3.38 G: 3; .375 INJECTION, POWDER, FOR SOLUTION INTRAVENOUS at 01:57

## 2020-09-19 RX ADMIN — PIPERACILLIN AND TAZOBACTAM 3.38 G: 3; .375 INJECTION, POWDER, FOR SOLUTION INTRAVENOUS at 20:43

## 2020-09-19 RX ADMIN — HYDRALAZINE HYDROCHLORIDE 50 MG: 50 TABLET, FILM COATED ORAL at 13:40

## 2020-09-19 RX ADMIN — PIPERACILLIN AND TAZOBACTAM 3.38 G: 3; .375 INJECTION, POWDER, FOR SOLUTION INTRAVENOUS at 08:16

## 2020-09-19 RX ADMIN — Medication 1 TABLET: at 08:16

## 2020-09-19 RX ADMIN — HYDRALAZINE HYDROCHLORIDE 50 MG: 50 TABLET, FILM COATED ORAL at 08:16

## 2020-09-19 RX ADMIN — HUMAN INSULIN 6 UNITS/HR: 100 INJECTION, SOLUTION SUBCUTANEOUS at 05:58

## 2020-09-19 RX ADMIN — HYDRALAZINE HYDROCHLORIDE 50 MG: 50 TABLET, FILM COATED ORAL at 20:44

## 2020-09-19 RX ADMIN — MYCOPHENOLATE MOFETIL 500 MG: 500 TABLET ORAL at 18:22

## 2020-09-19 RX ADMIN — INSULIN HUMAN 5 UNITS: 100 INJECTION, SUSPENSION SUBCUTANEOUS at 15:36

## 2020-09-19 RX ADMIN — INSULIN ASPART 20 UNITS: 100 INJECTION, SOLUTION INTRAVENOUS; SUBCUTANEOUS at 08:14

## 2020-09-19 RX ADMIN — MULTIPLE VITAMINS W/ MINERALS TAB 1 TABLET: TAB at 08:16

## 2020-09-19 RX ADMIN — MYCOPHENOLATE MOFETIL 500 MG: 500 TABLET ORAL at 08:16

## 2020-09-19 RX ADMIN — TACROLIMUS 1.5 MG: 1 CAPSULE ORAL at 08:16

## 2020-09-19 RX ADMIN — Medication 1 TABLET: at 18:22

## 2020-09-19 RX ADMIN — PIPERACILLIN AND TAZOBACTAM 3.38 G: 3; .375 INJECTION, POWDER, FOR SOLUTION INTRAVENOUS at 13:40

## 2020-09-19 RX ADMIN — VALGANCICLOVIR 450 MG: 450 TABLET, FILM COATED ORAL at 08:16

## 2020-09-19 ASSESSMENT — ACTIVITIES OF DAILY LIVING (ADL)
ADLS_ACUITY_SCORE: 15

## 2020-09-19 ASSESSMENT — MIFFLIN-ST. JEOR: SCORE: 1590.58

## 2020-09-19 NOTE — PROGRESS NOTES
"IP Diabetes Management  Daily Note           Assessment and Plan:   HPI: Mariusz Sharp \"Red\" is a 59 year old male with history of CAD, LV thrombus, CKD Stage III, PAF, DM Type II, and ICM s/p HM III LVAD as BTT (2018) with subsequent OHT 5/18/20, presenting with progressive SOB, cough, and fever, found to have right hilar lung mass with infiltrate. S/P bronch with biopsy 9/15. Poor PO intake, and starting enteral feeds.    Assessment:   1)Type II Diabetes Mellitus,  2) Enteral feed-induced hyperglycemia   Still high requirement on IV drip but improving due to steroid effect waning. Will transition off drip today.     Plan: (orders put in for you, spoke to RN about plan)   - Increase to NPH 15 every morning, give NPH 5 units once now, stop IV insulin drip   - Start NPH 60 units at 6 pm daily.   -high dose correction scale TID with meals and bedtime.    -reduction to 1:6g CHO with all meals and snacks   -BG monitoring q1-2 hours per IV insulin protocol.    -hypoglycemia protocol   -diet with carb counting protocol   -diabetes education needs to be reassessed prior to discharge.     Outpatient follow up: TBD      Interval History and Assessment: interval glucose trend reviewed:   Received 6mg Dexamethasone in OR for biopsy 9/17.   Team switched to cycled TF last 9/17; started IV insulin for anticipated significant hyperglycemia.   Require high amount on drip today, about 4-6 units per hour during the day    Current nutritional intake and type: Orders Placed This Encounter      Regular Diet Adult  continuous tube feeds: Nutren 1.5 (goal 45cc/hour, for 190g CHO daily)    PTA Diabetes Regimen:   CGM: Dexcom G6.  Lantus 32 units daily HS  Novolog 1:8g CHO and high intensity sliding scale AC/HS.    Discharge Planning: TBD           Diabetes History:   Type of Diabetes: Type 2 Diabetes Mellitus, TPN/Enteral Feeding Induced Hyperglycemia  Lab Results   Component Value Date    A1C 7.4 08/19/2020    A1C 7.2 08/13/2020    A1C " 7.8 06/15/2020    A1C 8.0 05/12/2020    A1C 9.5 07/05/2019              Review of Systems:   The Review of Systems is negative other than noted in the Interval History.           Medications:     Current Facility-Administered Medications   Medication     acetaminophen (TYLENOL) tablet 975 mg     albuterol (PROVENTIL) neb solution 2.5 mg     benzocaine-menthol (CEPACOL) 15-3.6 MG lozenge 1 lozenge     calcium carbonate 600 mg-vitamin D 400 units (CALTRATE) per tablet 1 tablet     dextrose 10% infusion     glucose gel 15-30 g    Or     dextrose 50 % injection 25-50 mL    Or     glucagon injection 1 mg     hydrALAZINE (APRESOLINE) injection 10 mg     hydrALAZINE (APRESOLINE) tablet 50 mg     insulin 1 unit/mL in saline (NovoLIN, HumuLIN Regular) drip - ADULT IV Infusion     insulin aspart (NovoLOG) injection (RAPID ACTING)     insulin aspart (NovoLOG) injection (RAPID ACTING)     insulin aspart (NovoLOG) injection (RAPID ACTING)     insulin aspart (NovoLOG) injection (RAPID ACTING)     insulin isophane human (HumuLIN N PEN) injection 10 Units     Lidocaine (LIDOCARE) 4 % Patch 1 patch    And     lidocaine patch in PLACE     lidocaine (LMX4) cream     lidocaine 1 % 0.1-1 mL     loperamide (IMODIUM) capsule 2 mg     magnesium sulfate 2 g in water intermittent infusion     magnesium sulfate 4 g in 100 mL sterile water (premade)     multivitamin w/minerals (THERA-VIT-M) tablet 1 tablet     mycophenolate (GENERIC EQUIVALENT) tablet 500 mg     naloxone (NARCAN) injection 0.1-0.4 mg     ondansetron (ZOFRAN) tablet 4 mg     oxyCODONE (ROXICODONE) tablet 5 mg     Patient is already receiving mechanical prophylaxis     piperacillin-tazobactam (ZOSYN) 3.375 g vial to attach to  mL bag     potassium chloride (KLOR-CON) Packet 20-40 mEq     potassium chloride 10 mEq in 100 mL intermittent infusion with 10 mg lidocaine     potassium chloride 10 mEq in 100 mL sterile water intermittent infusion (premix)     potassium chloride  "20 mEq in 50 mL intermittent infusion     potassium chloride ER (KLOR-CON M) CR tablet 20-40 mEq     prochlorperazine (COMPAZINE) injection 5 mg     rosuvastatin (CRESTOR) tablet 10 mg     sodium chloride (NEBUSAL) 3 % neb solution 3 mL     sodium chloride (PF) 0.9% PF flush 3 mL     sodium chloride (PF) 0.9% PF flush 3 mL     sodium phosphate 10 mmol in D5W intermittent infusion     sodium phosphate 15 mmol in D5W intermittent infusion     sodium phosphate 20 mmol in D5W intermittent infusion     sodium phosphate 25 mmol in D5W intermittent infusion     tacrolimus (GENERIC EQUIVALENT) capsule 1 mg     tacrolimus (GENERIC EQUIVALENT) capsule 1.5 mg     valGANciclovir (VALCYTE) tablet 450 mg            Physical Exam:    BP (!) 150/104 (BP Location: Left arm, Cuff Size: Adult Regular)   Pulse 107   Temp 97.4  F (36.3  C) (Oral)   Resp 18   Ht 1.626 m (5' 4\")   Wt 85.5 kg (188 lb 6.4 oz)   SpO2 96%   BMI 32.34 kg/m             Data:     Recent Labs   Lab 09/19/20  0811 09/19/20  0658 09/19/20  0602 09/19/20  0520 09/19/20  0513 09/19/20  0417 09/19/20  0315  09/18/20  1844  09/18/20  0457  09/17/20  1842  09/17/20  0440  09/16/20 1958   GLC  --   --   --   --  171*  --   --   --  139*  --  171*  --  289*  --  301*  --  237*   * 124* 177* 165*  --  168* 185*   < >  --    < >  --    < >  --    < >  --    < >  --     < > = values in this interval not displayed.     Ronda Kauffman   Endocrinology Fellow PGY-5  Discussed with staff endocrinologist Dr Mariann Dan    I have reviewed the fellow's note and agree with the plan  Dr. Mariann Dan [phone]    "

## 2020-09-19 NOTE — PROGRESS NOTES
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      PLAN:  - awaiting biopsy results from RUL mass (9/17)  - tacro level tomorrow        Fever, secondary to post-obstructive pneumonia  Cough  Cryptogenic RUL mass vs consolidation in the RUL and right hilum with narrowing of multiple RUL bronchi (since 7/29)  Postobstructive pneumonia  Diagnosed with rhinovirus 8/2020.  Pertinent Work-up:  - 8/31:  Fungitell assay was low-grade positive, but corresponding galactomannan antigen assay was negative   - 9/2:  PCP PCR sputum negative.  CT Chest showing new RUL mass concerning for PTLD vs infection  - 9/4:  Histoplasma and blastomyces negative  - 9/8:  EBUS with BAL negative cytology and PJP, histopathy non-diagnostic, FNA negative for malignancy, Cystology showing rare pseudohyphae. Negative for malignancy. Birla cytopathic change absent.  EBUS ? Of hyphae, initially covered fungal source, now off antifungals  - 9/9:  blood cultures NGTD  - 9/13:  Chest CT showing increased RUL mass with progressive post-obstructive PNA.  - 9/15:  Bronchial brush fluid negative for malignancy, no pneumocystis seen, no viral cytopathic effect seen  - 9/17:  repeat Bronchoscopy with EBUS --> no visualization of the endobronchial mass but tissue obtained from the area of the RUQ mass     Pending Fever Work-up:  - 9/8 BAL AFB NGTD, Bacterial culture - respiratory simona thus far, " Fungal Culture NGTD, cytopathology negative  - 9/14 Viral respiratory culture NGTD  - 9/17 BAL AFB NGTD, fungal Culture NGTD, Bacterial culture NGTD, acrinomyces, legionella, nocardia r/o NGTD, FNA and cytology pending  - 9/17 leukemia lymphoma evaluation --> showed polytypic B cells, no aberrant immuno[henotype on T cells, no evidence of non-Hodgkin's lymphoma.    - Per Pulm, recommend awaiting growth from 9/17 biopsies before referring to thoracics as this is going to be a difficult site for them to access.    Antibiotic summary:  - On vanc zosyn from 8/31 to 9/1  - Cefdnir added 9/1, stopped 9/13  - Posaconazole 9/4-9/13, no beneficial effect on either fevers nor the size of the RUL mass despite therapeutic levels  - Atovaquone 9/12-9/15   - Zosyn restarted 9/13 (when cefdinir and posaconazole were stopped)     Plan:  - Appreciate Transplant ID and IR Pulm consults  - Continue to monitor cultures, if remain negative and treatment plan unclear, will discuss further with pulm/thoracic team  - Continue empiric Zosyn through 9/22/20 to complete a ten day course for a presumptive post-obstructive (or aspiration) right lung pneumonia  - Once the Zosyn course is completed, on 9/23/20, resume cefdinir 300mg PO BID through 10/1/20 to complete a six week (combined serial antibiotics) course as treatment for chronic prostatitis      NICM, s/p OHT 5/18/20  His post-operative course was c/b NSVT (with no hemodynamic compromise), leukocytosis with C Acnes growing from his former LVAD site (thought to be a contaminant, but treated with IV vanco --> po doxy through 6/1/20, 14d course total), and in the setting of donor blood growing S anginosus and Veillonella (also thought to be a contaminant, but treated with Vanco/zosyn --> Vanco/Flagyl for a total of 7 days).  He also had hyperkalemia, which improved following kayexalate and stopping bactrim.  He ultimately discharged 5/29.    Biopsies have remained negative for rejection.   Last SELVIN 8/31 showed normal graft function with LVEF >70% and normal RV size/function.  RHC 8/26 showed normal biventricular filling pressures, with RA 3, mPA 15, PCW 10, and CI 2.63.    Serostatus:  - CMV D+/R-  - EBV D-/R+  - Toxo D-/R-     Immunosuppression:  - MMF decreased to 500mg twice daily on 9/8 due to low ImmuKnow levels  - tacro, goal level 8-10.  Tacro level from 9/18 was 7.2.  Continue tacro 1.5mg/1mg, and will repeat tacro level tomorrow.     Graft function:  - BP:  130-140/90-100s, continue hydralazine 50mg TID  - HRs:  100s  - fluid status:  euvolemic/slightly hypovolemic      PPx:  - CAV:  Aspirin held due to hematuria.  Continue crestor 10mg daily.  - CMV:  valcyte 450mg EOD (renally dosed), x6 months (through 11/2020)  - Osteoporosis:  Calcium/vitamin D supplements  - GI:  Pantoprazole 40mg daily     Health maintenance:  - scheduled for month 4 surveillance 9/30  - needs flu shot       ANDREW on CKD  Hyponatremia, hypovolemic  PTA creatinine ranged 1.3-1.7.  UA showing protein 30, RBC 53.  Creatinine trended up to 3.88, and has since trended down.  Multiple renal ultrasounds negative for obstructive stones and hydronephrosis.  Urine sodium 52 and FeNA 2.5%, suggesting intrinsic renal process.  Per renal team, he may have developed some ATN due to a combination of mild volume depletion and tacrolimus.    - renal consult, appreciate rec's  - continue to trend BMP  - avoid hypovolemia, so will give NS as indicated        OTHER:  NSVT, resolved:  Postoperative, with no hemodynamic compromise.  Elevated PSA with suspected chronic prostatitis:  Prostate MRI showed signs of BPH.  PSA was 10.8 8/2020, 8.8 9/2020.  Completing antibiotic course as noted above.  DMII:  Continue insulin management per endocrine, appreciate consult and rec's.  Diarrhea, intermittent, improving:  CDiff and CMV were negative on admission, and enteric panel negative 9/9, CDiff again negative 9/17.  Diarrhea attributed to TFs.  PRN  "imodium ordered.  HTN:  PTA amlodipine held, BPs controlled on hydralazine 50mg TID.      FEN: regular diet, TFs per nutrition  PROPHY:  Ambulate, SQH stopped due hematuria  LINES:  PIV  DISPO:  TBD  CODE STATUS:  Full code    =============================================================    Interval History/ROS:   Mr. Sharp states that he feels better.  His breathing is better.  He has been walking some, with improved strength.  He has not been eating a lot, and does not feel much of an appetite.  His appetite might have picked up slightly since cycling TFs.  He denies nausea, vomiting, diarrhea, and constipation.  He cannot tell if he is retaining fluid.  He continues to report a cough.  He otherwise denies chest pain, palpitations, worsening dizziness, headaches, acute vision changes, fevers, chills, sore throat, and signs of bleeding.      Last 24 hr care team notes reviewed.   ROS:  4 point ROS including Respiratory, CV, GI and , other than that noted in the HPI, is negative.     Medications: Reviewed in EPIC.     Physical Exam:   BP (!) 150/104 (BP Location: Left arm, Cuff Size: Adult Regular)   Pulse 107   Temp 97.4  F (36.3  C) (Oral)   Resp 18   Ht 1.626 m (5' 4\")   Wt 85.5 kg (188 lb 6.4 oz)   SpO2 96%   BMI 32.34 kg/m    GENERAL: Appears alert and oriented times three. Lying in bed, NAD.  HEENT: Eye symmetrical and free of discharge bilaterally. Mucous membranes moist and without lesions.  NECK: Supple and without lymphadenopathy. JVD appears above clavicle when lying at 45 .   CV: RRR, S1S2 present without murmur, rub, or gallop.   RESPIRATORY: Respirations regular, even, and unlabored. Lungs CTA throughout.   GI: Soft and non distended with normoactive bowel sounds present in all quadrants. No tenderness, rebound, guarding. No organomegaly.   EXTREMITIES: Mild-moderate bilateral LE edema. 2+ bilateral pedal pulses.   NEUROLOGIC: Alert and orientated x 3. CN II-XII grossly intact. No focal " deficits.   MUSCULOSKELETAL: No joint swelling or tenderness.   SKIN: No jaundice. No rashes or lesions.     Data:  CMP  Recent Labs   Lab 09/19/20  0513 09/18/20 1844 09/18/20 0457 09/17/20 1842 09/17/20 0440 09/16/20  0541    136 134 134 133   < > 131*   POTASSIUM 5.1 5.1 5.2 4.8 4.6   < > 4.6   CHLORIDE 108 108 107 106 103   < > 99   CO2 21 21 20 21 19*   < > 21   ANIONGAP 7 7 7 7 12   < > 11   * 139* 171* 289* 301*   < > 274*   BUN 57* 60* 66* 54* 59*   < > 54*   CR 2.94* 3.45* 3.50* 3.35* 3.75*   < > 3.68*   GFRESTIMATED 22* 18* 18* 19* 17*   < > 17*   GFRESTBLACK 26* 21* 21* 22* 19*   < > 20*   ARLENE 8.4* 8.7 8.4* 8.4* 8.0*   < > 8.2*   MAG 1.9  --  1.9  --  1.9  --  2.3   PHOS 2.9  --  1.5*  --  2.0*  --  3.0   PROTTOTAL 5.6*  --  5.8*  --  5.6*  --  5.6*   ALBUMIN 1.9*  --  1.8*  --  1.5*  --  1.7*   BILITOTAL 0.3  --  0.4  --  0.4  --  0.4   ALKPHOS 95  --  108  --  114  --  116   AST 87*  --  69*  --  87*  --  125*   ALT 74*  --  66  --  73*  --  79*    < > = values in this interval not displayed.     CBC  Recent Labs   Lab 09/19/20 0513 09/18/20 0457 09/17/20 0440 09/16/20  0541   WBC 5.1 4.0 3.3* 3.4*   RBC 2.90* 2.85* 2.91* 3.01*   HGB 7.8* 7.7* 7.9* 8.5*   HCT 26.5* 25.6* 25.9* 26.4*   MCV 91 90 89 88   MCH 26.9 27.0 27.1 28.2   MCHC 29.4* 30.1* 30.5* 32.2   RDW 14.1 13.6 13.6 13.7    348 380 404       Patient discussed with Dr. Stone.      Karen Benson, LEX, NP-BC, CHFN  Advanced Heart Failure Nurse Practitioner  [facility]

## 2020-09-19 NOTE — PROGRESS NOTES
Nephrology Progress Note  09/19/2020         Assessment & Recommendations:   Mariusz Sharp is a 59 year old male with PMH of ICM s/p HM III LVAD as BTT with subsequent OHT 5/18/20, LV thrombus, DM Type II, CKD3. He presented with progressive SOB, cough, and fever and diarrhea. Nephrology consulted for ANDREW.    #ANDREW on CKD III, non oliguric  Patients baseline Scr is likey ~1.3-1.6 with gradual rise to 3.6 from 9/9-9/16. He's had occasional loose stools, but no obvious acute renal insult in previous 10 days. Patient has normal cardiac function on echo (8/31). His tacrolimus levels have been mostly stable/in target range, though he did have some supratherapeutic levels ~9/6-9/8. Renal US with non-obstructing stone, no hydro, no mass. Urine sodium 52 and FeNa 2.5% suggestive of intrinsic renal process. Probable ATN due to combination of mild volume depletion and tacrolimus. He may just be more sensitive to the renal vasoconstrictive effects of tacrolimus due to likely underlying renovascular disease.   - Avoid volume depletion as able  - follow BMP  - avoid nephrotoxins and consider decreasing tacrolimus dose as able  - renally dose medications  - follow Is/Os     Recommendations were communicated to primary team via note    Renal service will follow peripherally, please page with questions     Seen and discussed with Dr. Marcio Hinojosa MD   [phone]    Interval History :   Nursing and provider notes from last 24 hours reviewed.  In the last 24 hours Mariusz Sharp remained clinically stable without any acute events. Cr continues to improve.    Review of Systems:   I reviewed the following systems:  GI: No nausea or vomiting or diarrhea.   Neuro:  No confusion  Constitutional:  No fever or chills  CV: No dyspnea    Physical Exam:   I/O last 3 completed shifts:  In: 2464.72 [P.O.:960; I.V.:509.72; NG/GT:280]  Out: 3055 [Urine:3055]   /64 (BP Location: Left arm)   Pulse 108   Temp 97.4  F (36.3  C)  "(Oral)   Resp 18   Ht 1.626 m (5' 4\")   Wt 85.5 kg (188 lb 6.4 oz)   SpO2 100%   BMI 32.34 kg/m       GENERAL APPEARANCE: NAD, lying in bed  EYES:  no scleral icterus, pupils equal  HENT: mouth without ulcers or lesions  PULM: lungs clear to auscultation bilaterally, equal air movement  CV: regular rhythm, normal rate, no rub     -edema trace bilateral  GI: soft, non-tender  NEURO:  Alert, following commands, speech normal    Labs:   All labs reviewed by me  Electrolytes/Renal -   Recent Labs   Lab Test 09/19/20  0513 09/18/20  1844 09/18/20  0457  09/17/20  0440    136 134   < > 133   POTASSIUM 5.1 5.1 5.2   < > 4.6   CHLORIDE 108 108 107   < > 103   CO2 21 21 20   < > 19*   BUN 57* 60* 66*   < > 59*   CR 2.94* 3.45* 3.50*   < > 3.75*   * 139* 171*   < > 301*   ARLENE 8.4* 8.7 8.4*   < > 8.0*   MAG 1.9  --  1.9  --  1.9   PHOS 2.9  --  1.5*  --  2.0*    < > = values in this interval not displayed.       CBC -   Recent Labs   Lab Test 09/19/20  0513 09/18/20 0457 09/17/20  0440   WBC 5.1 4.0 3.3*   HGB 7.8* 7.7* 7.9*    348 380       LFTs -   Recent Labs   Lab Test 09/19/20  0513 09/18/20  0457 09/17/20  0440   ALKPHOS 95 108 114   BILITOTAL 0.3 0.4 0.4   ALT 74* 66 73*   AST 87* 69* 87*   PROTTOTAL 5.6* 5.8* 5.6*   ALBUMIN 1.9* 1.8* 1.5*       Iron Panel -   Recent Labs   Lab Test 09/16/20  0541 06/26/19  0925 07/23/18  0645   IRON 30* 58 35   IRONSAT 17 16 13*   JOSE  --  17* 646*       Imaging:  All imaging studies reviewed by me.     Current Medications:    calcium carbonate 600 mg-vitamin D 400 units  1 tablet Oral BID w/meals     hydrALAZINE  50 mg Oral TID     insulin aspart   Subcutaneous Daily with lunch     insulin aspart   Subcutaneous QAM AC     insulin aspart   Subcutaneous Daily with supper     insulin isophane human  10 Units Subcutaneous QAM     insulin isophane human  5 Units Subcutaneous Once     lidocaine  1 patch Transdermal Q24h    And     lidocaine   Transdermal Q8H     " multivitamin w/minerals  1 tablet Oral Daily     mycophenolate  500 mg Oral BID IS     piperacillin-tazobactam  3.375 g Intravenous Q6H     rosuvastatin  10 mg Oral At Bedtime     sodium chloride (PF)  3 mL Intracatheter Q8H     tacrolimus  1 mg Oral QPM     tacrolimus  1.5 mg Oral QAM     valGANciclovir  450 mg Oral Every Other Day       dextrose       - MEDICATION INSTRUCTIONS -       - MEDICATION INSTRUCTIONS -       Jaime Hinojosa MD

## 2020-09-19 NOTE — PLAN OF CARE
D: Admitted 8/31 with syncope, SOB, cough, and fever. Found to have mass on RUL.   Hx: HTN, CAD, CKD3, hx of LV thrombus, PAF, DM2, ICM s/p LVAD HM3 with subsequent heart tx 5/18/20.    I: Monitored vitals and assessed pt status.   Changed: insulin gtt titrated for BS- on Algorithm #4  Running: NS @ TKO w/Insulin gtt  PRN: none    A: A0x4. VSS, on 2L O2/oxymask. RADER. Monitor SR/ST  with r/PVC, r/PAC.  Denies c/o pain. Last BM 9/18. Good UO. Blood sugars 146-240. TF @ 65cc/hr from 6p-6a. Appeared to sleep between cares.     I/O this shift:  In: 470 [NG/GT:80]  Out: 975 [Urine:975]    Temp:  [97.4  F (36.3  C)-98.4  F (36.9  C)] 98.4  F (36.9  C)  Pulse:  [] 103  Resp:  [16-22] 18  BP: (125-139)/(87-99) 139/99  SpO2:  [93 %-98 %] 97 %      P: Continue to monitor Pt status and report changes to treatment team.

## 2020-09-19 NOTE — PROGRESS NOTES
Calorie Count  Intake recorded for: 9/18  Total Kcals: 1204 Total Protein: 45g  Kcals from Hospital Food: 1204  Protein: 45g  Kcals from Outside Food (average):0 Protein: 0g  # Meals Recorded: 3 meals ordered: 100% 2 ice creams, 2 whole milks, cream of wheat, coffee w/ 3 sugars, 3 splendas, and 3 creamers, orange, Meatloaf, cream of potato soup. 50% banana, 25% quesadilla w/ chicken.  # Supplements Recorded: No intake recorded.

## 2020-09-20 ENCOUNTER — APPOINTMENT (OUTPATIENT)
Dept: OCCUPATIONAL THERAPY | Facility: CLINIC | Age: 60
End: 2020-09-20
Payer: COMMERCIAL

## 2020-09-20 LAB
ACID FAST STN SPEC QL: NORMAL
ALBUMIN SERPL-MCNC: 2 G/DL (ref 3.4–5)
ALP SERPL-CCNC: 100 U/L (ref 40–150)
ALT SERPL W P-5'-P-CCNC: 109 U/L (ref 0–70)
ANION GAP SERPL CALCULATED.3IONS-SCNC: 7 MMOL/L (ref 3–14)
AST SERPL W P-5'-P-CCNC: 105 U/L (ref 0–45)
BILIRUB DIRECT SERPL-MCNC: 0.1 MG/DL (ref 0–0.2)
BILIRUB SERPL-MCNC: 0.3 MG/DL (ref 0.2–1.3)
BUN SERPL-MCNC: 46 MG/DL (ref 7–30)
CALCIUM SERPL-MCNC: 8.1 MG/DL (ref 8.5–10.1)
CHLORIDE SERPL-SCNC: 108 MMOL/L (ref 94–109)
CO2 SERPL-SCNC: 20 MMOL/L (ref 20–32)
CREAT SERPL-MCNC: 2.29 MG/DL (ref 0.66–1.25)
ERYTHROCYTE [DISTWIDTH] IN BLOOD BY AUTOMATED COUNT: 14.5 % (ref 10–15)
GFR SERPL CREATININE-BSD FRML MDRD: 30 ML/MIN/{1.73_M2}
GLUCOSE BLDC GLUCOMTR-MCNC: 108 MG/DL (ref 70–99)
GLUCOSE BLDC GLUCOMTR-MCNC: 143 MG/DL (ref 70–99)
GLUCOSE BLDC GLUCOMTR-MCNC: 233 MG/DL (ref 70–99)
GLUCOSE BLDC GLUCOMTR-MCNC: 237 MG/DL (ref 70–99)
GLUCOSE BLDC GLUCOMTR-MCNC: 272 MG/DL (ref 70–99)
GLUCOSE BLDC GLUCOMTR-MCNC: 281 MG/DL (ref 70–99)
GLUCOSE BLDC GLUCOMTR-MCNC: 294 MG/DL (ref 70–99)
GLUCOSE SERPL-MCNC: 336 MG/DL (ref 70–99)
HCT VFR BLD AUTO: 27.7 % (ref 40–53)
HGB BLD-MCNC: 8.2 G/DL (ref 13.3–17.7)
MAGNESIUM SERPL-MCNC: 1.6 MG/DL (ref 1.6–2.3)
MCH RBC QN AUTO: 27.4 PG (ref 26.5–33)
MCHC RBC AUTO-ENTMCNC: 29.6 G/DL (ref 31.5–36.5)
MCV RBC AUTO: 93 FL (ref 78–100)
PHOSPHATE SERPL-MCNC: 2.2 MG/DL (ref 2.5–4.5)
PLATELET # BLD AUTO: 380 10E9/L (ref 150–450)
POTASSIUM SERPL-SCNC: 5.1 MMOL/L (ref 3.4–5.3)
PROT SERPL-MCNC: 5.9 G/DL (ref 6.8–8.8)
RBC # BLD AUTO: 2.99 10E12/L (ref 4.4–5.9)
SODIUM SERPL-SCNC: 135 MMOL/L (ref 133–144)
SPECIMEN SOURCE: NORMAL
TACROLIMUS BLD-MCNC: 4.4 UG/L (ref 5–15)
TME LAST DOSE: ABNORMAL H
WBC # BLD AUTO: 3.8 10E9/L (ref 4–11)

## 2020-09-20 PROCEDURE — 25000131 ZZH RX MED GY IP 250 OP 636 PS 637: Performed by: PHYSICIAN ASSISTANT

## 2020-09-20 PROCEDURE — 25000128 H RX IP 250 OP 636: Performed by: NURSE PRACTITIONER

## 2020-09-20 PROCEDURE — 25000132 ZZH RX MED GY IP 250 OP 250 PS 637: Performed by: STUDENT IN AN ORGANIZED HEALTH CARE EDUCATION/TRAINING PROGRAM

## 2020-09-20 PROCEDURE — 36415 COLL VENOUS BLD VENIPUNCTURE: CPT | Performed by: NURSE PRACTITIONER

## 2020-09-20 PROCEDURE — 21400000 ZZH R&B CCU UMMC

## 2020-09-20 PROCEDURE — 80076 HEPATIC FUNCTION PANEL: CPT | Performed by: NURSE PRACTITIONER

## 2020-09-20 PROCEDURE — 85027 COMPLETE CBC AUTOMATED: CPT | Performed by: NURSE PRACTITIONER

## 2020-09-20 PROCEDURE — 80048 BASIC METABOLIC PNL TOTAL CA: CPT | Performed by: NURSE PRACTITIONER

## 2020-09-20 PROCEDURE — 25000131 ZZH RX MED GY IP 250 OP 636 PS 637: Performed by: NURSE PRACTITIONER

## 2020-09-20 PROCEDURE — 25000132 ZZH RX MED GY IP 250 OP 250 PS 637: Performed by: NURSE PRACTITIONER

## 2020-09-20 PROCEDURE — 25000125 ZZHC RX 250: Performed by: PHYSICIAN ASSISTANT

## 2020-09-20 PROCEDURE — 25800030 ZZH RX IP 258 OP 636: Performed by: PHYSICIAN ASSISTANT

## 2020-09-20 PROCEDURE — 00000146 ZZHCL STATISTIC GLUCOSE BY METER IP

## 2020-09-20 PROCEDURE — 84100 ASSAY OF PHOSPHORUS: CPT | Performed by: NURSE PRACTITIONER

## 2020-09-20 PROCEDURE — 83735 ASSAY OF MAGNESIUM: CPT | Performed by: NURSE PRACTITIONER

## 2020-09-20 PROCEDURE — 25000131 ZZH RX MED GY IP 250 OP 636 PS 637: Performed by: INTERNAL MEDICINE

## 2020-09-20 PROCEDURE — 80197 ASSAY OF TACROLIMUS: CPT | Performed by: NURSE PRACTITIONER

## 2020-09-20 PROCEDURE — 99232 SBSQ HOSP IP/OBS MODERATE 35: CPT | Performed by: NURSE PRACTITIONER

## 2020-09-20 PROCEDURE — 27210429 ZZH NUTRITION PRODUCT INTERMEDIATE LITER

## 2020-09-20 PROCEDURE — 97530 THERAPEUTIC ACTIVITIES: CPT | Mod: GO

## 2020-09-20 PROCEDURE — 97535 SELF CARE MNGMENT TRAINING: CPT | Mod: GO

## 2020-09-20 PROCEDURE — 25000128 H RX IP 250 OP 636: Performed by: INTERNAL MEDICINE

## 2020-09-20 RX ORDER — TACROLIMUS 1 MG/1
2 CAPSULE ORAL
Status: DISCONTINUED | OUTPATIENT
Start: 2020-09-21 | End: 2020-09-24 | Stop reason: HOSPADM

## 2020-09-20 RX ORDER — TACROLIMUS 1 MG/1
2 CAPSULE ORAL
Status: DISCONTINUED | OUTPATIENT
Start: 2020-09-20 | End: 2020-09-22

## 2020-09-20 RX ADMIN — HYDRALAZINE HYDROCHLORIDE 75 MG: 50 TABLET, FILM COATED ORAL at 20:36

## 2020-09-20 RX ADMIN — TACROLIMUS 2 MG: 1 CAPSULE ORAL at 17:36

## 2020-09-20 RX ADMIN — Medication 1 TABLET: at 08:10

## 2020-09-20 RX ADMIN — INSULIN GLARGINE 15 UNITS: 100 INJECTION, SOLUTION SUBCUTANEOUS at 22:00

## 2020-09-20 RX ADMIN — INSULIN ASPART 6 UNITS: 100 INJECTION, SOLUTION INTRAVENOUS; SUBCUTANEOUS at 10:56

## 2020-09-20 RX ADMIN — MULTIPLE VITAMINS W/ MINERALS TAB 1 TABLET: TAB at 08:11

## 2020-09-20 RX ADMIN — PIPERACILLIN AND TAZOBACTAM 3.38 G: 3; .375 INJECTION, POWDER, FOR SOLUTION INTRAVENOUS at 20:36

## 2020-09-20 RX ADMIN — PIPERACILLIN AND TAZOBACTAM 3.38 G: 3; .375 INJECTION, POWDER, FOR SOLUTION INTRAVENOUS at 14:20

## 2020-09-20 RX ADMIN — TACROLIMUS 1.5 MG: 1 CAPSULE ORAL at 08:10

## 2020-09-20 RX ADMIN — Medication 1 TABLET: at 17:35

## 2020-09-20 RX ADMIN — ROSUVASTATIN CALCIUM 10 MG: 10 TABLET, FILM COATED ORAL at 20:36

## 2020-09-20 RX ADMIN — SODIUM PHOSPHATE, MONOBASIC, MONOHYDRATE AND SODIUM PHOSPHATE, DIBASIC, ANHYDROUS 15 MMOL: 276; 142 INJECTION, SOLUTION INTRAVENOUS at 15:37

## 2020-09-20 RX ADMIN — MAGNESIUM SULFATE IN WATER 2 G: 40 INJECTION, SOLUTION INTRAVENOUS at 10:46

## 2020-09-20 RX ADMIN — MYCOPHENOLATE MOFETIL 500 MG: 500 TABLET ORAL at 08:11

## 2020-09-20 RX ADMIN — PIPERACILLIN AND TAZOBACTAM 3.38 G: 3; .375 INJECTION, POWDER, FOR SOLUTION INTRAVENOUS at 08:12

## 2020-09-20 RX ADMIN — HYDRALAZINE HYDROCHLORIDE 75 MG: 50 TABLET, FILM COATED ORAL at 08:11

## 2020-09-20 RX ADMIN — MYCOPHENOLATE MOFETIL 500 MG: 500 TABLET ORAL at 17:35

## 2020-09-20 RX ADMIN — ACETAMINOPHEN 975 MG: 325 TABLET, FILM COATED ORAL at 10:45

## 2020-09-20 RX ADMIN — PIPERACILLIN AND TAZOBACTAM 3.38 G: 3; .375 INJECTION, POWDER, FOR SOLUTION INTRAVENOUS at 01:44

## 2020-09-20 RX ADMIN — HYDRALAZINE HYDROCHLORIDE 75 MG: 50 TABLET, FILM COATED ORAL at 14:20

## 2020-09-20 ASSESSMENT — ACTIVITIES OF DAILY LIVING (ADL)
ADLS_ACUITY_SCORE: 15

## 2020-09-20 ASSESSMENT — PAIN DESCRIPTION - DESCRIPTORS: DESCRIPTORS: SHARP

## 2020-09-20 ASSESSMENT — MIFFLIN-ST. JEOR: SCORE: 1574.7

## 2020-09-20 NOTE — PLAN OF CARE
D- cough, SOB, RUL mass s/p bx 9/8  I/A- 1L O2 PRN, on RA most of shift with O2 saturations >95% on RA. VSS. SR/ST. Insulin gtt stopped per order. Calorie counts. TF cycled 6pm-6am at 65ml/hr. Mag 1.9, replaced. IV abx. 1 assist.  P- Continue to monitor and notify team of changes.

## 2020-09-20 NOTE — PROGRESS NOTES
Brief Nephrology Note:    Chart reviewed. Following for ANDREW. See prior note for details. Cr improving and patient maintains good UOP.    Nephrology will sign off, please page with questions.    Discussed with Dr Davis.    Jaime Hinojosa MD  [facility]  [phone]

## 2020-09-20 NOTE — PROGRESS NOTES
[facility]  Transplant Infectious Disease Progress Note     Patient:  Mariusz Sharp, Date of birth 1960, Medical record number [id number]  Date of Visit:  09/20/2020         Assessment and Recommendations:   Recommendations:  - Continue empiric Zosyn through 9/22/20 to complete a ten day course for a presumptive post-obstructive (or aspiration) right lung pneumonia.  - Once the Zosyn course is completed, on 9/23/20, resume cefdinir 300 mg PO BID through 10/1/20 to complete a six week (combined serial antibiotics) course as treatment for chronic prostatitis.  - Continue Valcyte prophylaxis.  - Await further results from the 9/17/20 repeat BAL and hilar lesion transbronchial biopsy.  If no useful answer is obtained, would then repeat the chest CT scan again and if the lesion is still enlarging, proceed to a surgical (perhaps mediastinoscopic versus VATS) approach to biopsy.    Transplant ID will follow with you.  Dr. Rodrigeuz (pager [phone]) will see him for Transplant ID this coming week.    Edson Farrell MD  Pager [phone]    Assessment:  A 59 year old gentleman immunosuppressed (tacrolimus, MMF; initial basiliximab induction) s/p a 5/18/20 heart transplant for ischemic cardiomyopathy (following 6/18 STEMI and 12/31/18 LVAD) who also has a history of chronic kidney disease, paroxysmal atrial fibrillation, hypertension, and type 2 diabetes mellitus.  He was admitted to the [facility] Cardiology 2 service on 8/31/20 with progressive dyspnea, cough, and fever, and was found to have a RUL hilar mass which subsequently increased in size (despite empiric posaconazole therapy).  Attempts at diagnosis via bronchoscopies on 9/8/20 (with endobronchial lesions biopsy) and 9/15/20 were non-diagnostic, so he is now s/p a repeat RUL BAL and transbronchial EBUS biopsy of the mass on 9/17/20.  He is stable on room air.    ID issues:    - Now-resolved fevers 9/7 - 13/20, probably due to  post-obstructive pneumonia:  On empiric Zosyn starting 9/13/20, his fever quickly resolved and he has remained afebrile since, implying that the diagnosis of a right post-obstructive (or aspiration) pneumonia was correct.  A ten-day course of Zosyn should suffice.  The post-obstructive pneumonia is superimposed on an idiopathic right lung hilar mass and other subacute / chronic parenchymal lung changes.  A 9/13/20 chest CT showed increasing size of the right hilar mass/consolidation with associated narrowing of the subsegmental right upper lobe bronchioles, rasing the possibilities of both a malignancy-associated fever, an inadequately treated fungal or other infectious phlegmon, or a new post-obstructive pneumonia.  The 9/13/20 CT also showed multiple scattered pulmonary nodules and increased bibasilar groundglass and right lung tree-in-bud nodular opacities.  There was little else in the way of localizing symptoms or signs beyond the lungs (and urinary tract -- prostate) to suggest other potential fever sources elsewhere, including negative blood cultures on 9/7/20 x 2 and 9/9/20 x 2,   Most likely, the source of the resolved fever was the lungs.  A course of empiric posaconazole given from 9/3 - 13/20 had no beneficial effect on either his fevers or the size of the RUL hilar mass (despite a therapeutic posaconazole level of 2.2 on 9/13/20), so the failed antifungal was discontinued.  A 9/12/20 absolute CD4+ cell count was 359 and his 9/8/20 BAL cytopathology was negative, making PJP very unlikely (despite his mildly increased serum LDH of 228 on 9/1/20) -- empiric atovaquone was given 9/12 - 15/20 but was discontinued as useless based on thsoe lab results.    - Cryptogenic mass versus consolidation in the right upper lobe and right hilum since 7/29/20 with narrowing of multiple right upper lobe bronchi:   The differential diagnosis has been pneumonia with reactive right hilar lymphadenopathy versus malignancy  (such as lymphoma / PTLD with post-obstructive pneumonia, although his blood EBV PCR has been negative). Empiric posaconazole coverage against mold pneumonias was given 9/3 - 13/20 but the mass size was increased on the repeat 9/13/20 chest CT. The fine-needle biopsy of a bronchial lesion 9/8/20 was non-diagnostic (although a 9/8/20 rDNA PCR assay result is still pending) as is so far a repeat 9/15/20 BAL.  Histoplasma and Blastomyces antigen assays were negative on 9/4/20.  A 9/2/20 PJP PCR of sputum was negative. An 8/31/20 Fungitell assay was low-grade stably positive at 90 but a corresponding galactomannan antigen assay was negative, as were 9/1/20 and 9/4/20 cryptococcal antigen assays.  We are presently awaiting results (culture and pathology) of the 9/17/20 transbronchial EBUS biopsies of the RUL hilar mass -- the tissue Gram stain was negative, the BAL FACS analysis shows only polytypic B cells, without aberrant T cells immunophenotype, and the cultures are without growth to date.    - Primarily-drug-induced, much-improved nausea:  He had a 9/9/20 posaconazole level of 1.6, on 300 mg daily, but his AST then sonali to 85 on 9/12/20, so the posaconazole dose was decreased to 200 mg daily on 9/13/20 and discontinued afterwards, with subsequent improvement in his nausea.    - Enlarged prostate by CT imaging in the past, combined with elevated PSA, suspected chronic prostatitis:  He has chronic symptoms of urgency and post-void dribbling.  The 8/19/2020 PSA was 10.8. UCx neg, including post-prostate massage sample obtained on 8/20/2020. WBC in UA have decreased since he started a 6-week antibiotic course of cefdinir (which was due to end 10/1/20).  On the cefdinir, he has noticed no real change to his dribbling symptom. A repeat 9/13/2020 PSA was unchanged at 8.8.  Urinalysis and urine culture on 9/14/20 were negative and a urine viral culture from then is without growth to date.    - Improving acute-on-chronic  renal insufficiency:  The etiology is unclear, but Nephrology Consult thought was likely due to volume depletion pre-renal ATN plus tacrolimus.  He has not recently received nephrotoxic antimicrobials -- Zosyn alone (in the absence of other nephrotoxins) is not generally in and of itself a cause of elevated creatinines.    A renal ultrasound was negative (except a non-obstructing stone) on 9/15/20.  The Zosyn dose has been renally adjusted.    - Diarrhea, mild, likely due to tube feeds. Stool testing was negative on 9/10/20 for enteric pathogens and C difficile by PCR.  A repeat C difficile PCR was negative on 9/17/20.    Old ID issues:  - Hx of Rhinovirus shedding 8/19/2020. Neg on NP swab 8/31/2020.   - Hx of Donor BCx grew S anginosus and Veillonella sp in 1/2 sets on 5/16/20 (suspected contaminant) treated with 7 days antibiotics.  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    Other ID issues:  - PCP prophylaxis: Bactrim was discontinued 5/27/20 due to high potassium levels, and nebulized pentamidine was given in its place 6/1/2020, 6/30/2020, and 7/29/2020. Breakthrough pneumocystis infections can occur when nebulized pentamidine is used, delicia in the upper lobes, so is can be misleading that he had 9/2/2020 PJP PCR of sputum neg and 9/8/2020 BAL cytology neg for PJP. Would not use dapsone as he is already anemic. Atovaquone started 9/12/2020, 750 mg BID, but since his 9/12/20 absolute CD4+ cell count was quite good at 359, that was stopped 9/15/20.  - QTc interval: 448 msec on 9/9/2020 EKG  - Viral serostatus & prophylaxis: CMV D+/R-, EBV D-/R+, HSV1?/2?, VZV +, Toxo D-/R-; Valcyte  - Immunization status: he will be due for seasonal influenza, once the vaccine is available.  - Gamma globulin status: Serum IgG was mildly low at 510 on 9/13/20 -- no repletion is presently indicated.  - Isolation status: Routine.        Interval History:   Mr. Sharp remains  "consistently afebrile (T max 98.4 degrees F) since 9/13/20 evening (after empiric Zosyn therapy began for an aspiration or post-obstructive right pneumonia).  His peripheral WBC remains stable now in the 3 - 5K range which seems to be his \"normal baseline\" (but is down from the 6 - 9K range when he had fevers before the Zosyn).  He remains on Zosyn (since 9/13/20, preceded by cefdinir 9/1 - 12/20, which he will resume and be on through 10/1/20 for chronic prostatitis) and Valcyte prophylaxis.  Atovaquone was discontinued 9/15/20 after a 9/12/20 absolute CD4+ cell count was resulted at 359.  He also remains off the prior empiric antifungal therapy (posaconazole from 9/4 - 13/20) for lack of seeing evident benefit and because it caused nausea.  He underwent a repeat bronchoscopy with BAL and EBUS biopsies x 6 of his RUL lung mass by Interventional Pulmonology 9/17/20 afternoon (after the prior 9/8/20 and 9/16/20 attempts were not diagnostic, with 9/8/20 biopsy of an endobronchial mass showing non-inflammatory, non-granulomatous tissue with a negative GMS stain).  The Gram stain and KOH from that 9/17/20 biopsy are negative, the BAL fluid FACS analysis was unremarkable, and multiple tissue biopsy cultures (aerobic /  fungal / AFB / Actinomyces / Nocardia / Legionella) sent are so far without growth to date.  The histopathology and BAL cytopathology are pending.  The prior 9/15/20 BAL results are also unremarkable to date including a negative aerobic culture, fungus culture, and cytopathology.  He is generally stable since the 9/17/20 procedure and lacks new symptoms today, with improved energy / overall strength versus last weekend and the week before that, but is still rather weak.   He ambulated about 600 yards with Physical Therapy on 9/18/20 without oxygen, and says he walked a somewhat similar distance with Nursing yesterday.  His dry cough is unchanged and he still has random moments of inability to catch his " breath at rest, but the rest of the time lacks resting dyspnea on room air.  He has ongoing (presumably tube feeds induced) diarrhea which was C difficile negative on 9/17/20.  His nausea has mostly resolved off the posaconazole and he is now eating full meals.      His creatinine is substantially improved down to 2.29 today (from an ANDREW peak of 3.88 on 9/16/20 (versus 1.35 on 9/6/20) -- Nephrology Consult felt the ANDREW was due to volume depletion plus tacrolimus.  Urinalysis and urine culture on 9/14/20 were negative and a urine viral culture from then is without growth to date.  A 9/15/20 renal ultrasound was unremarkable.  A repeat 9/13/2020 PSA was unchanged at 8.8.      A 9/12/20 absolute CD4+ cell count was 359, making PJP quite unlikely (so atovaquone was discontinued).  The 9/13/20 repeat chest CT scan showed an increased size of the right hilar mass (despite empiric posaconazole from 9/3 - 13/20 with a therapeutic posaconazole level of 2.2 on 9/13/20), prompting the need for the repeat 9/17/20 BAL / EBUS.  Off the posaconazole, his mildly increased LFTs were normalizing, but his transaminases are up again today (, ) for unclear reason.  A 9/13/20 repeat plasma CMV PCR viral load was undetectable.    Transplants:  5/18/2020 (Heart), Postoperative day:  125.  Coordinator Angelika Muller    Review of Systems:  CONSTITUTIONAL:  Intermittent, worsening fevers 9/7 - 13/20, now resolved since then.  Fatigue, anorexia, generalized weakness persist but have improved since 9/13/20.  On tube feeds for malnutrition / hypoalbuminemia but now taking more PO.  EYES: No eye pain, visual changes, or scleral icterus.  ENT:  No rhinorrhea, sinus pain, otalgia, hearing loss, tinnitus, sore throat, or oral pain.  Slight past epistaxis (now resolved).  LYMPH:  Mild distal edema.  RESPIRATORY:  Persistent cough (previously clear productive, now dry on Zosyn).  No resting dyspnea on room air except occasionally  has random, brief episodes of inability to catch his breath that spontaneously resolve.  No major exertional dyspnea, although exercise capacity is limited compared to his baseline (has run five marathons in his distant past).  CARDIOVASCULAR:  S/p heart transplant.  Past sternal and recent (improving) left axillary chest discomfort.  No other chest pain, palpitations.  GASTROINTESTINAL:  Nausea is mostly resolved / fully controlled (seemed to improve when posaconazole was stopped).  Diarrhea 4 - 6 quite loose stools/day.  No abdominal pain, nausea, vomiting, diarrhea or constipation.  GENITOURINARY:  Acute on chronic renal insufficiency.  Chronic prostatitis / prostatism.  Post-void dribbling.  No dysuria / hematuria.  HEME:  Chronic anemia.  No easy bruising.  ENDOCRINE:  Diabetes mellitus on insulin.  MUSCULOSKELETAL:  As above.  No myalgias / arthralgias.  SKIN:  No rash or pruritus.  NEURO:  No headache.  PSYCH:  Negative.         Current Medications & Allergies:       calcium carbonate 600 mg-vitamin D 400 units  1 tablet Oral BID w/meals     hydrALAZINE  75 mg Oral TID     insulin aspart  1-10 Units Subcutaneous TID AC     insulin aspart  1-7 Units Subcutaneous At Bedtime     insulin aspart   Subcutaneous Daily with lunch     insulin aspart   Subcutaneous QAM AC     insulin aspart   Subcutaneous Daily with supper     insulin isophane human  15 Units Subcutaneous QAM     insulin isophane human  60 Units Subcutaneous Q24H     lidocaine  1 patch Transdermal Q24h    And     lidocaine   Transdermal Q8H     multivitamin w/minerals  1 tablet Oral Daily     mycophenolate  500 mg Oral BID IS     piperacillin-tazobactam  3.375 g Intravenous Q6H     rosuvastatin  10 mg Oral At Bedtime     sodium chloride (PF)  3 mL Intracatheter Q8H     tacrolimus  1 mg Oral QPM     tacrolimus  1.5 mg Oral QAM     valGANciclovir  450 mg Oral Every Other Day     Infusions/Drips:    dextrose       - MEDICATION INSTRUCTIONS -       -  MEDICATION INSTRUCTIONS -       No Known Allergies         Physical Exam:   Vitals were reviewed.  All vitals stable.  Patient Vitals for the past 24 hrs:   BP Temp Temp src Pulse Resp SpO2 Weight   09/20/20 1117 (!) 123/90 98  F (36.7  C) Axillary 109 18 98 % --   09/20/20 0729 (!) 151/112 97.9  F (36.6  C) Axillary 103 18 97 % --   09/20/20 0235 (!) 140/91 97.7  F (36.5  C) Oral 106 18 98 % --   09/20/20 0140 -- -- -- -- -- -- 83.9 kg (184 lb 14.4 oz)   09/19/20 2216 (!) 140/101 98  F (36.7  C) Oral 105 18 97 % --   09/19/20 1900 (!) 155/104 98  F (36.7  C) Oral 110 18 97 % --   09/19/20 1518 125/64 97.4  F (36.3  C) Oral 108 18 100 % --     Ranges for vital signs over the past 24 hours:   Temp:  [97.4  F (36.3  C)-98  F (36.7  C)] 98  F (36.7  C)  Pulse:  [103-110] 109  Resp:  [18] 18  BP: (123-155)/() 123/90  SpO2:  [97 %-100 %] 98 %  Vitals:    09/18/20 0644 09/19/20 0111 09/20/20 0140   Weight: 84.4 kg (186 lb) 85.5 kg (188 lb 6.4 oz) 83.9 kg (184 lb 14.4 oz)     Intake/Output Summary (Last 24 hours) at 9/20/2020 1136  Last data filed at 9/20/2020 1120  Gross per 24 hour   Intake 3048.93 ml   Output 3370 ml   Net -321.07 ml     Physical Examination:  GENERAL:  Pleasant, talkative, less-fatigued-appearing 60 yo man sitting up in a chair watching TV in no discomfort.  HEAD:  NCAT.  EYES:  EOMI, anicteric sclerae  ENT:  No otorrhea. Right NJ feeding tube.  No supplemental oxygen.  No anterior oral lesion.  NECK:  Supple.   LUNGS:  Anterior bilaterally clear to auscultation.  CARDIOVASCULAR:  RRR, no murmur.  Healed sternotomy scar.  ABDOMEN:  Normal bowel sounds, soft, nontender.  BACK:  No CVA tenderness.  :  No Perez.  EXTREMITIES:  Distally warm, trace bipedal edema.  SKIN:  No acute rash. PIV site lacks inflammation.  NEUROLOGIC:  Grossly nonfocal. Active x4 extremities         Laboratory Data:     Absolute CD4   Date Value Ref Range Status   09/12/2020 359 (L) 441 - 2,156 cells/uL Final      Inflammatory Markers    Recent Labs   Lab Test 08/19/20  1759  07/23/18  0645   CRP  --   --  11.0*   PSA 10.80*   < >  --     < > = values in this interval not displayed.     Immune Globulin Studies    Recent Labs   Lab Test 09/13/20  0612   *   IGM 29*   *        Metabolic Studies       Recent Labs   Lab Test 09/20/20  0605 09/19/20  0513  09/13/20  0804  09/01/20  0658 08/31/20  1817 08/31/20  1259  08/19/20  0558  06/15/20  0826    135   < >  --    < > 134  --  130*   < > 139   < > 139   POTASSIUM 5.1 5.1   < >  --    < > 3.8  --  4.0   < > 3.6   < > 4.8   CHLORIDE 108 108   < >  --    < > 104  --  100   < > 107   < > 110*   CO2 20 21   < >  --    < > 22  --  22   < > 25   < > 21   ANIONGAP 7 7   < >  --    < > 9  --  8   < > 6   < > 8   BUN 46* 57*   < >  --    < > 21  --  33*   < > 27   < > 98*   CR 2.29* 2.94*   < >  --    < > 1.38*  --  1.60*   < > 1.31*   < > 1.89*   GFRESTIMATED 30* 22*   < >  --    < > 56*  --  47*   < > 60*   < > 38*   * 171*   < >  --    < > 118*  --  186*   < > 69*   < > 120*   A1C  --   --   --   --   --   --   --   --   --  7.4*   < > 7.8*   ARLENE 8.1* 8.4*   < >  --    < > 8.5  --  9.6   < > 8.4*   < > 9.0   PHOS 2.2* 2.9   < >  --   --   --   --   --    < >  --    < > 4.4   MAG 1.6 1.9   < >  --    < > 1.4*  --   --    < >  --    < > 1.7   LACT  --   --   --  0.7  --  0.9  --  1.5  --   --    < >  --    PCAL  --   --   --   --   --   --   --  0.23  --   --    < >  --    FGTL  --   --   --   --   --   --  90  --    < >  --   --   --    CKT  --   --   --   --   --   --   --   --   --   --   --  132    < > = values in this interval not displayed.     Hepatic Studies    Recent Labs   Lab Test 09/20/20  0605 09/19/20  0513 09/18/20  0457  09/12/20  1204  09/01/20  0658   BILITOTAL 0.3 0.3 0.4   < >  --    < > 0.9   DBIL 0.1 0.1 0.1   < >  --    < >  --    ALKPHOS 100 95 108   < >  --    < > 62   PROTTOTAL 5.9* 5.6* 5.8*   < >  --    < > 6.1*   ALBUMIN  2.0* 1.9* 1.8*   < >  --    < > 2.4*   * 87* 69*   < >  --    < > 16   * 74* 66   < >  --    < > 11   LDH  --   --   --   --  380*  --  228*    < > = values in this interval not displayed.     Pancreatitis testing    Recent Labs   Lab Test 09/01/20  0658  05/18/20  0857 05/18/20  0010   AMYLASE  --   --  56 65   TRIG 143   < >  --   --     < > = values in this interval not displayed.     Gout Labs      Recent Labs   Lab Test 08/21/20  0456 07/06/19  0355 06/26/19  0925 01/23/19  1027 07/23/18  0645   URIC 5.2 5.0 5.0 4.4 4.4     Hematology Studies      Recent Labs   Lab Test 09/20/20  0605 09/19/20  0513 09/18/20  0457 09/17/20  0440 09/16/20  0541  09/15/20  0522  09/05/20  0549 09/04/20  0543   WBC 3.8* 5.1 4.0 3.3* 3.4*  --  4.7   < > 5.3 3.8*   ANEU  --   --   --   --   --   --   --   --  4.1 3.1   ALYM  --   --   --   --   --   --   --   --  0.8 0.3*   MARTHA  --   --   --   --   --   --   --   --  0.3 0.2   AEOS  --   --   --   --   --   --   --   --  0.0 0.2   HGB 8.2* 7.8* 7.7* 7.9* 8.5*   < > 6.7*   < > 8.3* 8.7*   HCT 27.7* 26.5* 25.6* 25.9* 26.4*  --  21.9*   < > 26.5* 27.5*    411 348 380 404  --  413   < > 353 376    < > = values in this interval not displayed.     Clotting Studies    Recent Labs   Lab Test 08/26/20  0939 06/04/20  0900 06/01/20  0856 05/18/20  1835   INR 1.06 1.16* 1.16* 1.54*   PTT  --   --   --  31     Iron Testing    Recent Labs   Lab Test 09/20/20  0605  09/16/20  0541  06/26/19  0925  07/23/18  0645   IRON  --   --  30*  --  58  --  35   FEB  --   --  176*  --  370  --  272   IRONSAT  --   --  17  --  16  --  13*   JOSE  --   --   --   --  17*  --  646*   MCV 93   < > 88   < > 88   < > 93   B12  --   --   --   --  638  --  1,143*    < > = values in this interval not displayed.     Arterial Blood Gas Testing    Recent Labs   Lab Test 05/24/20  1137 05/21/20  0756 05/21/20  0445 05/20/20  2340  05/19/20  0943 05/19/20  0812 05/19/20  0324 05/19/20  0009   05/18/20  1835   PH  --   --   --   --   --  7.40 7.40 7.39 7.35  --  7.43   PCO2  --   --   --   --   --  27* 33* 38 39  --  33*   PO2  --   --   --   --   --  131* 124* 132* 153*  --  323*   HCO3  --   --   --   --   --  17* 21 23 21  --  22   O2PER 24.0 2L 2L 2L   < > 40 40  40 40  40 40  40   < > 100    < > = values in this interval not displayed.     Thyroid Studies     Recent Labs   Lab Test 06/26/19  0925 07/23/18  0645   TSH 3.94 6.91*   T4  --  1.05     Urine Studies     Recent Labs   Lab Test 09/16/20  1620 09/14/20 2020 08/31/20  1615 08/20/20  1630 08/18/20  1404  01/30/19  1255   URINEPH 5.5 5.5 5.5 5.5 5.5   < > 5.0   NITRITE Negative Negative Negative Negative Negative   < > Negative   LEUKEST Trace* Negative Negative Small* Moderate*   < > Negative   WBCU 5 4 1 12* 28*   < > 82*   UWBCLM  --   --   --   --   --   --  Present*    < > = values in this interval not displayed.     Medication levels    Recent Labs   Lab Test 09/18/20  0457  09/13/20  0612  05/27/20  0837   VANCOMYCIN  --   --   --   --  26.6*   PSCON  --   --  2.2   < >  --    TACROL 7.2   < > 9.3   < >  --     < > = values in this interval not displayed.     Microbiology:    Last Culture results with specimen source  Culture Micro   Date Value Ref Range Status   09/17/2020 Culture negative monitoring continues  Preliminary   09/17/2020   Preliminary    Culture received and in progress.  Positive AFB results are called as soon as detected.    Final report to follow in 7 to 8 weeks.     09/17/2020   Preliminary    Assayed at [facility] [address] [place] [id number] [phone]   09/17/2020 Culture negative after 21 hours  Preliminary   09/17/2020 Culture negative monitoring continues  Preliminary   09/17/2020 No growth after 21 hours  Preliminary   09/17/2020 Culture negative monitoring continues  Preliminary   09/15/2020   Preliminary    Culture received and in progress.  Positive AFB results are called as soon as  detected.    Final report to follow in 7 to 8 weeks.     09/15/2020   Preliminary    Assayed at [facility] [address] [facility] [place] [address] [phone]   09/15/2020 Culture negative after 3 days  Preliminary   09/15/2020 No growth  Final   09/14/2020 No growth  Final   09/09/2020 No growth after 9 days  Preliminary   09/09/2020 No growth after 9 days  Preliminary   09/09/2020 No growth  Final   09/09/2020 No growth  Final   09/08/2020   Preliminary    Culture received and in progress.  Positive AFB results are called as soon as detected.    Final report to follow in 7 to 8 weeks.     09/08/2020   Preliminary    Assayed at [facility] [address] [facility] [place] [address] [phone]   09/08/2020 Culture negative after 1 week  Preliminary   09/08/2020 Light growth  Normal respiratory simona    Final   09/08/2020   Preliminary    Culture received and in progress.  Positive AFB results are called as soon as detected.    Final report to follow in 7 to 8 weeks.     09/08/2020   Preliminary    Assayed at [facility] [address] [facility] [place] [address] [phone]   09/08/2020 Culture negative after 1 week  Preliminary   09/08/2020 No growth after 10 days  Preliminary   09/08/2020 Light growth  Normal respiratory simona    Final   09/07/2020 No growth  Final   09/07/2020 No growth  Final   09/02/2020 (A)  Final    Canceled, Test credited  >10 Squamous epithelial cells/low power field indicates oral contamination. Please   recollect.     09/02/2020 Called to  Sasha Wheat RN at 0442 9/2/20 hg    Final   08/31/2020 Canceled, Test credited  Final   08/31/2020 (A)  Final    >10 Squamous epithelial cells/low power field indicates oral contamination. Please   recollect.     08/31/2020   Final    Notification of test cancellation was given to  Sasha Wheat RN @ 0133 9/1/20 TM.     08/31/2020 No growth  Final   08/31/2020 No growth  Final   08/20/2020 No growth  Final   08/20/2020 No yeast isolated   Final   08/20/2020 Light growth  Normal simona    Final   08/19/2020 No growth  Final   08/18/2020   Final    <10,000 colonies/mL  urogenital simona  Susceptibility testing not routinely done     08/18/2020 No growth  Final    Specimen Description   Date Value Ref Range Status   09/17/2020 Lung Right upper lobe Lesion  Final   09/17/2020 Lung Right upper lobe Lesion  Final   09/17/2020 Lung Right upper lobe Lesion  Final   09/17/2020 Lung Right upper lobe Lesion  Final   09/17/2020 Lung Right upper lobe Lesion  Final   09/17/2020 Lung Right upper lobe Lesion  Final   09/17/2020 Lung Right upper lobe Lesion  Final   09/17/2020 Lung Right upper lobe Lesion  Final   09/17/2020 Lung Right upper lobe Lesion  Final   09/17/2020 Feces  Final   09/15/2020 Bronchial  Right Upper Lobe  Final   09/15/2020 Bronchial  Right Upper Lobe  Final   09/15/2020 Bronchial  Right Upper Lobe  Final   09/15/2020 Bronchial  Right Upper Lobe  Final   09/14/2020 Unspecified Urine  Final   09/09/2020 Blood  Final   09/09/2020 Blood  Final   09/09/2020 Blood Left Hand  Final   09/09/2020 Blood Right Hand  Final   09/09/2020 Feces  Final   09/08/2020 Bronchial FINE NEEDLE ASPIRATION  Final   09/08/2020 Bronchial FINE NEEDLE ASPIRATION  Final   09/08/2020 Bronchial FINE NEEDLE ASPIRATION  Final   09/08/2020 Bronchoalveolar Lavage FINE NEEDLEL ASPIRATE  Final   09/08/2020 Bronchoalveolar Lavage FINE NEEDLE ASPIRATE  Final   09/08/2020 Bronchial lavage RIGHT LUNG  Final   09/08/2020 Bronchial lavage RIGHT LUNG  Final   09/08/2020 Bronchial lavage Right Lung SPCEIMEN 2  Final   09/08/2020 Bronchial lavage Right Lung SPECIMEN 2  Final   09/08/2020 Bronchial lavage Right Lung SPECIMEN 2  Final   09/08/2020 Bronchial lavage Right Lung SPECIMEN 2  Final   09/07/2020 Blood Left Arm  Final   09/07/2020 Blood Left Arm  Final   09/04/2020 Blood  Final   09/02/2020 Sputum  Final   09/02/2020 Sputum Screen  Final   09/01/2020 Urine Random  Final   09/01/2020  Blood  Final   09/01/2020 Feces  Final   09/01/2020 Urine  Final        Last check of C difficile  C Diff Toxin B PCR   Date Value Ref Range Status   09/17/2020 Negative NEG^Negative Final     Comment:     Negative: C. difficile target DNA sequences NOT detected, presumed negative   for C.difficile toxin B or the number of bacteria present may be below the   limit of detection for the test.  FDA approved assay performed using [employer] GeneXpert real-time PCR.  A negative result does not exclude actual disease due to C. difficile and may   be due to improper collection, handling and storage of the specimen or the   number of organisms in the specimen is below the detection limit of the assay.       Infection Studies to assess Diarrhea   Recent Labs   Lab Test 09/10/20  1415   EPSTX1 Not Detected   EPSTX2 Not Detected   EPCAMP Not Detected   EPSALM Not Detected   EPSHGL Not Detected   EPVIB Not Detected   EPROTA Not Detected   EPNORO Not Detected   EPYER Not Detected     Virology:    Respiratory virus testing    Recent Labs   Lab Test 09/14/20 2058 08/31/20  1302 08/21/20  0520 08/19/20  1900  08/18/20  1235   IFLUA  --  Not Detected  --  Not Detected   < >  --    FLUAH1  --  Not Detected  --  Not Detected   < >  --    RE0720  --  Not Detected  --  Not Detected   < >  --    FLUAH3  --  Not Detected  --  Not Detected   < >  --    IFLUB  --  Not Detected  --  Not Detected   < >  --    PIV1  --  Not Detected  --  Not Detected  --   --    PIV2  --  Not Detected  --  Not Detected  --   --    PIV3  --  Not Detected  --  Not Detected  --   --    PIV4  --  Not Detected  --  Not Detected   < >  --    RSVA  --  Not Detected  --  Not Detected   < >  --    RSVB  --  Not Detected  --  Not Detected   < >  --    HMPV  --  Not Detected  --  Not Detected  --   --    ADENOV  --  Not Detected  --  Not Detected   < >  --    CORONA  --  Not Detected  --  Not Detected   < >  --    [id number] Nasopharyngeal Nasopharyngeal Nasopharyngeal   --   --  Nasopharyngeal   SARSCOVRES NEGATIVE NEGATIVE NEGATIVE  --   --  NEGATIVE    < > = values in this interval not displayed.     Log IU/mL of CMVQNT   Date Value Ref Range Status   09/13/2020 Not Calculated <2.1 [Log_IU]/mL Final   09/01/2020 Not Calculated <2.1 [Log_IU]/mL Final   08/19/2020 Not Calculated <2.1 [Log_IU]/mL Final   08/13/2020 Not Calculated <2.1 [Log_IU]/mL Final   06/15/2020 Not Calculated <2.1 [Log_IU]/mL Final     EBV DNA Copies/mL   Date Value Ref Range Status   09/01/2020 EBV DNA Not Detected EBVNEG^EBV DNA Not Detected [Copies]/mL Final   08/13/2020 EBV DNA Not Detected EBVNEG^EBV DNA Not Detected [Copies]/mL Final   06/15/2020 EBV DNA Not Detected EBVNEG^EBV DNA Not Detected [Copies]/mL Final     Imaging:    No results found for this or any previous visit (from the past 48 hour(s)).      9/17 CXR (post-biopsy):  New opacities in the right upper and right lower lung suggesting areas of atelectasis or hemorrhage, postbiopsy. No pneumothorax.  9/15 Renal U/S:  Re-demonstrated nonobstructing right renal calculus. Otherwise  unremarkable appearance of the kidneys. No hydronephrosis.  9/14 ABX:  NJ tube placed.  9/13 Chest CT:  1. Increased size of the right hilar mass/consolidation with associated narrowing of the subsegmental right upper lobe bronchioles and multiple scattered pulmonary nodules as above. Differential continues to include infection versus malignancy with postobstructive pneumonia.  2. Increased bibasilar groundglass and right lung tree-in-bud nodular opacities suggestive of infection, possibly secondary to aspiration.  Small right pleural effusion.  3. Surgical changes of cardiac transplantation.  4. Cholelithiasis without cholecystitis.  9/7 CXR:  No acute cardiopulmonary findings.  Less prominent right superior hilar opacity.  9/2/20 Chest CT:  . New mass vs consolidation in the right upper lobe and right hilum since 7/29/2020 with narrowing of multiple right upper lobe  bronchi.  1a. Differential pneumonia with reactive right hilar lymphadenopathy vs. Malignancy (ex. Lymphoma / PTLD) with post obstructive pneumonia.  2. Stable additional pulmonary lesions as above.  3. Stable postsurgical changes of heart transplant.  4. Cholelithiasis without evidence of cholecystitis.  9/1 CXR:  Right superior hilar opacity, indeterminate. Possibly infection, adenopathy, or other. PA and lateral chest xray or CT.  8/31 Renal U/S:  1. Small echogenic foci in the upper pole of the right kidney and midpole the left kidney without posterior acoustic shadowing and no correlate on 6/2/2020 CT are unlikely to represent stones, potentially representing invagination of perinephric fat and extension of renal sinus fat, respectively, or other reflective interfaces of doubtful clinical significance. No appreciable urinary tract stone. The large left renal pelvis stone previously seen on 6/2/2020 CT is not visualized.  2. No hydronephrosis.  8/31 Head CT:  No acute intracranial pathology.  8/31 CXR:  No acute airspace disease.

## 2020-09-20 NOTE — PROGRESS NOTES
Calorie Count  Intake recorded for: 9/19  Total Kcals: 2691 Total Protein: 103g  Kcals from Hospital Food: 2691  Protein: 103g  Kcals from Outside Food (average):0 Protein: 0g  # Meals Recorded: 100% 3 meals ordered.  # Supplements Recorded: 100% 2 Boost Breeze

## 2020-09-20 NOTE — PLAN OF CARE
D: Admitted 8/31 with syncope, SOB, cough, and fever. Found to have mass on RUL.   Hx: HTN, CAD, CKD3, hx of LV thrombus, PAF, DM2, ICM s/p LVAD HM3 with subsequent heart tx 5/18/20.     I: Monitored vitals and assessed pt status.   Changed:   Running: PIV SL'd  PRN: none     A: A0x4. VSS except slightly hypertensive, on RA. RADER. Monitor -120 with r/PVC and r/PAC. Denies c/o pain. Last BM 9/20. Good UO. TF @ 65cc/hr from 7p-7a. Appeared to sleep between cares.       I/O this shift:  In: 807 [P.O.:237; I.V.:100; NG/GT:80]  Out: 900 [Urine:900]    Temp:  [97.4  F (36.3  C)-98  F (36.7  C)] 97.7  F (36.5  C)  Pulse:  [105-110] 106  Resp:  [18] 18  BP: (125-155)/() 140/91  SpO2:  [96 %-100 %] 98 %      P: Continue to monitor Pt status and report changes to treatment team.

## 2020-09-20 NOTE — PLAN OF CARE
Discharge Planner OT   Patient plan for discharge: Did not state   Current status: Ambulated to/from bathroom w/SBA using unilateral support w/IV pole, no LOB noted. SBA toilet transfer using grab bars, SBA for walk-in shower transfer using grab bars and min verbal cues for touch back technique for safety. IND w/prema-cares. SBA to don/doff gown, Bebe threading RUE 2/2 IV line management. SBA to wash/dry UB while standing. Mod-I to don/doff LB dressing, 1 LOB noted when pt was bending over to don briefs. Edu pt on EC/WS strategies, required min verbal cues to implement.  Barriers to return to prior living situation: deconditioning, medical status, fatigue, SOB.  Recommendations for discharge: Home w/Assist   Rationale for recommendations: Pt making steady progress in regards to functional mobility and ADLs, nearing return to baseline independence       Entered by: Macey Oliveira 09/20/2020 12:33 PM

## 2020-09-20 NOTE — PROGRESS NOTES
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      PLAN:  - awaiting biopsy results from RUL mass (9/17)  - increase hydralazine to 75mg TID  - give tacro 2mg this brayden, then increase to 2mg twice daily starting tomorrow  - repeat tacro level tomorrow to ensure level is not dropping further  - d/w nutrition -- will stop TFs today as calorie counts are improving, and will determine if NJ can be pulled tomorrow  - updated endocrine re: TFs stopping      Fever, secondary to post-obstructive pneumonia  Cough  Cryptogenic RUL mass vs consolidation in the RUL and right hilum with narrowing of multiple RUL bronchi (since 7/29)  Postobstructive pneumonia  Diagnosed with rhinovirus 8/2020.  Completed Work-up:  - 8/31:  Fungitell assay was low-grade positive/stable, and corresponding galactomannan antigen assay was negative.  COVID negative.  - 9/1:  Strep pneumo, CDiff, cryptococcal antigen, toxoplasma, legionella negative.  CMV and EBV negative.  - 9/2:  PJP PCR of sputum negative.  CT Chest showing new RUL mass concerning for PTLD vs infection, new since last chest CT 7/29.  - 9/4:  Aspergillus, coccidiodes, histoplasma, and blastomycis negative.  - 9/7:  BCs negative.  - 9/8:  Bronchial lavage of right lung negative for malignancy but GMS stain positive for rare pseudohyphae.  Nocardia negative.  PJP negative.  FNA negative to date.  FNA biopsy negative for " malignancy.  - 9/9:  CDiff negative, BCs negative.  - 9/12:  Absolute CD3 618, CD4 359, CD8 114, CD3 Mature T 71, CD4:CD8 ratio 3.15, CD4 Reddick T 41, CD8 Suppressor T 13.  - 9/13:  EBV negative.  IgA 108, IgG 510, IgM 29.  Repeat chest CT showed increase in size of RUL mass and post-obstructive PNA.  - 9/14:  COVID negative.  - 9/15:  Bronchial lavage of right lung negative for malignancy and negative for PJP and fungal orgamisms.  - 9/17:  CDiff negative.  RUL culture negative to date, nocardia negative to date.  Leukemia/lymphoma eval negative.     Pending Work-up:  - 9/8:  Bronchial lavage cultures in process, fungal DNA 28S ribosomal in process, FNA cultures in process.  - 9/14:  Viral respiratory cultures in process  - 9/15:  Bronchial lavage cultures in process  - 9/17:  Right upper lung biopsy in process, cytology and cultures in process.  Actinomyces, legionella, nocardia, KOH in process.    Antibiotic summary:  - On vanc/zosyn from 8/31 to 9/1  - Cefdnir added 9/1, stopped 9/13  - Posaconazole 9/4-9/13, no beneficial effect on either fevers nor the size of the RUL mass despite therapeutic levels  - Atovaquone 9/12-9/15   - Zosyn restarted 9/13 (when cefdinir and posaconazole were stopped)     Plan:  - Appreciate Transplant ID and IR Pulm consults  - Continue to monitor cultures, if remain negative and treatment plan unclear, will discuss further with pulm/thoracic team  - Continue empiric Zosyn through 9/22/20 to complete a ten day course for a presumptive post-obstructive (or aspiration) right lung pneumonia  - Once the Zosyn course is completed, on 9/23/20, resume cefdinir 300mg PO BID through 10/1/20 to complete a six week (combined serial antibiotics) course as treatment for chronic prostatitis  - Per Pulm, recommend awaiting growth from 9/17 biopsies before referring to thoracics as this is going to be a difficult site for them to access.      NICM, s/p OHT 5/18/20  His post-operative course was c/b  NSVT (with no hemodynamic compromise), leukocytosis with C Acnes growing from his former LVAD site (thought to be a contaminant, but treated with IV vanco --> po doxy through 6/1/20, 14d course total), and in the setting of donor blood growing S anginosus and Veillonella (also thought to be a contaminant, but treated with Vanco/zosyn --> Vanco/Flagyl for a total of 7 days).  He also had hyperkalemia, which improved following kayexalate and stopping bactrim.  He ultimately discharged 5/29.    Biopsies have remained negative for rejection.  Last SELVIN 8/31 showed normal graft function with LVEF >70% and normal RV size/function.  RHC 8/26 showed normal biventricular filling pressures, with RA 3, mPA 15, PCW 10, and CI 2.63.    Serostatus:  - CMV D+/R-  - EBV D-/R+  - Toxo D-/R-     Immunosuppression:  - MMF decreased to 500mg twice daily on 9/8 due to low ImmuKnow levels  - tacro, goal level 8-10.  Tacro level from today was 4.4 (reflecting an 11.5hr trough).  Give 2mg tacro this brayden, then increase to 2mg BID starting tomorrow.  Repeat level tomorrow to ensure that level is not dropping.     Graft function:  - BP:  140-150/90-100s, so will increase hydralazine to 75mg TID  - HRs:  100s-120s  - fluid status:  euvolemic/slightly hypovolemic, not on diuretics      PPx:  - CAV:  Aspirin held due to hematuria.  Continue crestor 10mg daily.  - CMV:  valcyte 450mg EOD (renally dosed), x6 months (through 11/2020)  - Osteoporosis:  Calcium/vitamin D supplements  - GI:  Pantoprazole 40mg daily     Health maintenance:  - scheduled for month 4 surveillance 9/30.  Note that cor angio has been deferred --> will need to revisit pending renal function  - needs flu shot       ANDREW on CKD  Hyponatremia, hypovolemic  PTA creatinine ranged 1.3-1.7.  UA showing protein 30, RBC 53.  Creatinine trended up to 3.88, and has since trended down.  Multiple renal ultrasounds negative for obstructive stones and hydronephrosis.  Urine sodium 52 and  FeNA 2.5%, suggesting intrinsic renal process.  Per renal team, he may have developed some ATN due to a combination of mild volume depletion and tacrolimus.    - renal consult, appreciate rec's  - continue to trend BMP  - avoid hypovolemia, so will give NS as indicated        OTHER:  NSVT, resolved:  Postoperative, with no hemodynamic compromise.  Elevated PSA with suspected chronic prostatitis:  Prostate MRI showed signs of BPH.  PSA was 10.8 8/2020, 8.8 9/2020.  Completing antibiotic course as noted above.  DMII:  Continue insulin management per endocrine, appreciate consult and rec's.  Insulin gtt stopped 9/19.  Stopping TFs today, updated endo.  Diarrhea, intermittent, improving:  CDiff and CMV were negative on admission, and enteric panel negative 9/9, CDiff again negative 9/17.  Diarrhea attributed to TFs.  PRN imodium ordered.  HTN:  PTA amlodipine held, BPs controlled on hydralazine 50mg TID.      FEN: regular diet, TFs per nutrition  PROPHY:  Ambulate, SQH stopped due hematuria  LINES:  PIV  DISPO:  TBD -- note that SW note from 9/16 states that his insurance does not cover TCU  CODE STATUS:  Full code    =============================================================    Interval History/ROS:   Mr. Sharp states that he feels ok today.  He did a lot of walking yesterday, and notes that his legs are sore and achy today.  PTA, he was able to walk long distances, without any leg soreness.  He is eating, and notes improved po intake.  He has had looser stools, and denies nausea and vomiting.  His breathing has been stable, but he is more short of breath than his baseline.  He does not feel any fluid retention.  He otherwise denies chest pain, palpitations, worsening dizziness, headaches, acute vision changes, fevers, chills, cough, sore throat, and signs of bleeding.      Last 24 hr care team notes reviewed.   ROS:  4 point ROS including Respiratory, CV, GI and , other than that noted in the HPI, is negative.  "    Medications: Reviewed in EPIC.     Physical Exam:   BP (!) 140/91 (BP Location: Left arm, Cuff Size: Adult Regular)   Pulse 106   Temp 97.7  F (36.5  C) (Oral)   Resp 18   Ht 1.626 m (5' 4\")   Wt 83.9 kg (184 lb 14.4 oz)   SpO2 98%   BMI 31.74 kg/m    GENERAL: Appears alert and oriented times three. Lying in bed, NAD.  HEENT: Eye symmetrical and free of discharge bilaterally. Mucous membranes moist and without lesions.  NECK: Supple and without lymphadenopathy. JVD appears negative when lying at 45 .   CV: RRR, S1S2 present without murmur, rub, or gallop.   RESPIRATORY: Respirations regular, even, and unlabored. Lungs CTA throughout.   GI: Soft and non distended with normoactive bowel sounds present in all quadrants. No tenderness, rebound, guarding. No organomegaly.   EXTREMITIES: Mild bilateral LE edema with pitting above ankles. 2+ bilateral pedal pulses.   NEUROLOGIC: Alert and orientated x 3. CN II-XII grossly intact. No focal deficits.   MUSCULOSKELETAL: No joint swelling or tenderness.   SKIN: No jaundice. No rashes or lesions.     Data:  CMP  Recent Labs   Lab 09/19/20  0513 09/18/20  1844 09/18/20  0457 09/17/20  1842 09/17/20  0440  09/16/20  0541    136 134 134 133   < > 131*   POTASSIUM 5.1 5.1 5.2 4.8 4.6   < > 4.6   CHLORIDE 108 108 107 106 103   < > 99   CO2 21 21 20 21 19*   < > 21   ANIONGAP 7 7 7 7 12   < > 11   * 139* 171* 289* 301*   < > 274*   BUN 57* 60* 66* 54* 59*   < > 54*   CR 2.94* 3.45* 3.50* 3.35* 3.75*   < > 3.68*   GFRESTIMATED 22* 18* 18* 19* 17*   < > 17*   GFRESTBLACK 26* 21* 21* 22* 19*   < > 20*   ARLENE 8.4* 8.7 8.4* 8.4* 8.0*   < > 8.2*   MAG 1.9  --  1.9  --  1.9  --  2.3   PHOS 2.9  --  1.5*  --  2.0*  --  3.0   PROTTOTAL 5.6*  --  5.8*  --  5.6*  --  5.6*   ALBUMIN 1.9*  --  1.8*  --  1.5*  --  1.7*   BILITOTAL 0.3  --  0.4  --  0.4  --  0.4   ALKPHOS 95  --  108  --  114  --  116   AST 87*  --  69*  --  87*  --  125*   ALT 74*  --  66  --  73*  --  79* "    < > = values in this interval not displayed.     CBC  Recent Labs   Lab 09/20/20  0605 09/19/20  0513 09/18/20  0457 09/17/20  0440   WBC 3.8* 5.1 4.0 3.3*   RBC 2.99* 2.90* 2.85* 2.91*   HGB 8.2* 7.8* 7.7* 7.9*   HCT 27.7* 26.5* 25.6* 25.9*   MCV 93 91 90 89   MCH 27.4 26.9 27.0 27.1   MCHC 29.6* 29.4* 30.1* 30.5*   RDW 14.5 14.1 13.6 13.6    411 348 380       Patient discussed with Dr. Stone.      Karen Benson, DNP, NP-BC, CHFN  Advanced Heart Failure Nurse Practitioner  [facility]

## 2020-09-20 NOTE — PROGRESS NOTES
"IP Diabetes Management  Daily Note           Assessment and Plan:   HPI: Mariusz Sharp \"Red\" is a 59 year old male with history of CAD, LV thrombus, CKD Stage III, PAF, DM Type II, and ICM s/p HM III LVAD as BTT (2018) with subsequent OHT 5/18/20, presenting with progressive SOB, cough, and fever, found to have right hilar lung mass with infiltrate. S/P bronch with biopsy 9/15. Poor PO intake, and starting enteral feeds.    Assessment:   1)Type II Diabetes Mellitus,  2) Enteral feed-induced hyperglycemia   He has good appetite now, TF will be stopped tonight. Will change regimen accordingly.     Plan: (orders put in for you_   - Since TF will be stopped, will discontinue all NPH insulin, start lantus 15 units tonight. For basal coverage.                  -high dose correction scale TID with meals and bedtime.    -Continue1:6g CHO with all meals and snacks   -BG monitoring q1-2 hours per IV insulin protocol.    -hypoglycemia protocol   -diet with carb counting protocol   -diabetes education needs to be reassessed prior to discharge.     Outpatient follow up: TBD    Interval History and Assessment: interval glucose trend reviewed:   Received 6mg Dexamethasone in OR for biopsy 9/17.   Team switched to cycled TF last 9/17; started IV insulin for anticipated significant hyperglycemia.   Require high amount on drip today, about 4-6 units per hour during the day    Current nutritional intake and type: Orders Placed This Encounter      Regular Diet Adult  continuous tube feeds: Nutren 1.5 (goal 45cc/hour, for 190g CHO daily)    PTA Diabetes Regimen:   CGM: Dexcom G6.  Lantus 32 units daily HS  Novolog 1:8g CHO and high intensity sliding scale AC/HS.    Discharge Planning: TBD           Diabetes History:   Type of Diabetes: Type 2 Diabetes Mellitus, TPN/Enteral Feeding Induced Hyperglycemia  Lab Results   Component Value Date    A1C 7.4 08/19/2020    A1C 7.2 08/13/2020    A1C 7.8 06/15/2020    A1C 8.0 05/12/2020    A1C 9.5 " 07/05/2019              Review of Systems:   The Review of Systems is negative other than noted in the Interval History.           Medications:     Current Facility-Administered Medications   Medication     acetaminophen (TYLENOL) tablet 975 mg     albuterol (PROVENTIL) neb solution 2.5 mg     benzocaine-menthol (CEPACOL) 15-3.6 MG lozenge 1 lozenge     calcium carbonate 600 mg-vitamin D 400 units (CALTRATE) per tablet 1 tablet     dextrose 10% infusion     glucose gel 15-30 g    Or     dextrose 50 % injection 25-50 mL    Or     glucagon injection 1 mg     hydrALAZINE (APRESOLINE) injection 10 mg     hydrALAZINE (APRESOLINE) tablet 75 mg     insulin aspart (NovoLOG) injection (RAPID ACTING)     insulin aspart (NovoLOG) injection (RAPID ACTING)     insulin aspart (NovoLOG) injection (RAPID ACTING)     insulin aspart (NovoLOG) injection (RAPID ACTING)     insulin aspart (NovoLOG) injection (RAPID ACTING)     insulin aspart (NovoLOG) injection (RAPID ACTING)     insulin isophane human (HumuLIN N PEN) injection 15 Units     insulin isophane human (HumuLIN N PEN) injection 60 Units     Lidocaine (LIDOCARE) 4 % Patch 1 patch    And     lidocaine patch in PLACE     lidocaine (LMX4) cream     lidocaine 1 % 0.1-1 mL     loperamide (IMODIUM) capsule 2 mg     magnesium sulfate 2 g in water intermittent infusion     magnesium sulfate 4 g in 100 mL sterile water (premade)     Med Instruction - Transition from IV Insulin Infusion to Sub-Q Insulin     multivitamin w/minerals (THERA-VIT-M) tablet 1 tablet     mycophenolate (GENERIC EQUIVALENT) tablet 500 mg     naloxone (NARCAN) injection 0.1-0.4 mg     ondansetron (ZOFRAN) tablet 4 mg     oxyCODONE (ROXICODONE) tablet 5 mg     Patient is already receiving mechanical prophylaxis     piperacillin-tazobactam (ZOSYN) 3.375 g vial to attach to  mL bag     potassium chloride (KLOR-CON) Packet 20-40 mEq     potassium chloride 10 mEq in 100 mL intermittent infusion with 10 mg  "lidocaine     potassium chloride 10 mEq in 100 mL sterile water intermittent infusion (premix)     potassium chloride 20 mEq in 50 mL intermittent infusion     potassium chloride ER (KLOR-CON M) CR tablet 20-40 mEq     prochlorperazine (COMPAZINE) injection 5 mg     rosuvastatin (CRESTOR) tablet 10 mg     sodium chloride (NEBUSAL) 3 % neb solution 3 mL     sodium chloride (PF) 0.9% PF flush 3 mL     sodium chloride (PF) 0.9% PF flush 3 mL     sodium phosphate 10 mmol in D5W intermittent infusion     sodium phosphate 15 mmol in D5W intermittent infusion     sodium phosphate 20 mmol in D5W intermittent infusion     sodium phosphate 25 mmol in D5W intermittent infusion     tacrolimus (GENERIC EQUIVALENT) capsule 1 mg     tacrolimus (GENERIC EQUIVALENT) capsule 1.5 mg     valGANciclovir (VALCYTE) tablet 450 mg            Physical Exam:    BP (!) 151/112 (BP Location: Left arm, Cuff Size: Adult Regular)   Pulse 103   Temp 97.9  F (36.6  C) (Axillary)   Resp 18   Ht 1.626 m (5' 4\")   Wt 83.9 kg (184 lb 14.4 oz)   SpO2 97%   BMI 31.74 kg/m             Data:     Recent Labs   Lab 09/20/20  0826 09/20/20  0605 09/20/20  0227 09/19/20  2146 09/19/20  1709 09/19/20  1617 09/19/20  1517  09/19/20  0513  09/18/20  1844  09/18/20  0457  09/17/20  1842  09/17/20  0440   GLC  --  336*  --   --   --   --   --   --  171*  --  139*  --  171*  --  289*  --  301*   *  --  237* 272* 118* 155* 151*   < >  --    < >  --    < >  --    < >  --    < >  --     < > = values in this interval not displayed.     Ronda Kauffman   Endocrinology Fellow PGY-5  Discussed with staff endocrinologist Dr Mariann Dan    I have seen and talked with the patient by phone and agree with the fellow's plan of care as noted. 15 minutes spent in discussion with pt and reviewing the chart.  September 20, 2020    Dr. Mariann Dan [phone]      "

## 2020-09-20 NOTE — PLAN OF CARE
D- cough, SOB, RUL mass s/p bx 9/8  I/A- RA. VSS, hypertensive 150/110, hydralazine dose increased today. -120 with rare PVC, rare PAC. Insulin gtt stopped per order. Calorie counts. TF held tonight, may discharge [place] tomorrow. Mag 1.6, replaced. Phos 2.2, needs to be replaced. IV abx. 1 assist.  P- Continue to monitor and notify team of changes.

## 2020-09-21 ENCOUNTER — APPOINTMENT (OUTPATIENT)
Dept: PHYSICAL THERAPY | Facility: CLINIC | Age: 60
End: 2020-09-21
Payer: COMMERCIAL

## 2020-09-21 ENCOUNTER — APPOINTMENT (OUTPATIENT)
Dept: OCCUPATIONAL THERAPY | Facility: CLINIC | Age: 60
End: 2020-09-21
Payer: COMMERCIAL

## 2020-09-21 ENCOUNTER — APPOINTMENT (OUTPATIENT)
Dept: CT IMAGING | Facility: CLINIC | Age: 60
End: 2020-09-21
Attending: NURSE PRACTITIONER
Payer: COMMERCIAL

## 2020-09-21 LAB
ALBUMIN SERPL-MCNC: 2.1 G/DL (ref 3.4–5)
ALP SERPL-CCNC: 84 U/L (ref 40–150)
ALT SERPL W P-5'-P-CCNC: 82 U/L (ref 0–70)
ANION GAP SERPL CALCULATED.3IONS-SCNC: 7 MMOL/L (ref 3–14)
ANISOCYTOSIS BLD QL SMEAR: SLIGHT
AST SERPL W P-5'-P-CCNC: 52 U/L (ref 0–45)
BASOPHILS # BLD AUTO: 0.1 10E9/L (ref 0–0.2)
BASOPHILS NFR BLD AUTO: 3.4 %
BILIRUB DIRECT SERPL-MCNC: 0.2 MG/DL (ref 0–0.2)
BILIRUB SERPL-MCNC: 0.4 MG/DL (ref 0.2–1.3)
BUN SERPL-MCNC: 37 MG/DL (ref 7–30)
CALCIUM SERPL-MCNC: 8.5 MG/DL (ref 8.5–10.1)
CHLORIDE SERPL-SCNC: 110 MMOL/L (ref 94–109)
CO2 SERPL-SCNC: 20 MMOL/L (ref 20–32)
COPATH REPORT: NORMAL
COPATH REPORT: NORMAL
CREAT SERPL-MCNC: 1.86 MG/DL (ref 0.66–1.25)
DIFFERENTIAL METHOD BLD: ABNORMAL
EOSINOPHIL # BLD AUTO: 0 10E9/L (ref 0–0.7)
EOSINOPHIL NFR BLD AUTO: 0.9 %
ERYTHROCYTE [DISTWIDTH] IN BLOOD BY AUTOMATED COUNT: 14.6 % (ref 10–15)
GFR SERPL CREATININE-BSD FRML MDRD: 39 ML/MIN/{1.73_M2}
GLUCOSE BLDC GLUCOMTR-MCNC: 139 MG/DL (ref 70–99)
GLUCOSE BLDC GLUCOMTR-MCNC: 148 MG/DL (ref 70–99)
GLUCOSE BLDC GLUCOMTR-MCNC: 158 MG/DL (ref 70–99)
GLUCOSE BLDC GLUCOMTR-MCNC: 311 MG/DL (ref 70–99)
GLUCOSE BLDC GLUCOMTR-MCNC: 415 MG/DL (ref 70–99)
GLUCOSE SERPL-MCNC: 160 MG/DL (ref 70–99)
HCT VFR BLD AUTO: 26.7 % (ref 40–53)
HGB BLD-MCNC: 8.1 G/DL (ref 13.3–17.7)
LAB SCANNED RESULT: NORMAL
LYMPHOCYTES # BLD AUTO: 0.7 10E9/L (ref 0.8–5.3)
LYMPHOCYTES NFR BLD AUTO: 22.2 %
MAGNESIUM SERPL-MCNC: 1.6 MG/DL (ref 1.6–2.3)
MCH RBC QN AUTO: 27.6 PG (ref 26.5–33)
MCHC RBC AUTO-ENTMCNC: 30.3 G/DL (ref 31.5–36.5)
MCV RBC AUTO: 91 FL (ref 78–100)
MONOCYTES # BLD AUTO: 0.2 10E9/L (ref 0–1.3)
MONOCYTES NFR BLD AUTO: 6.8 %
NEUTROPHILS # BLD AUTO: 2.2 10E9/L (ref 1.6–8.3)
NEUTROPHILS NFR BLD AUTO: 66.7 %
PHOSPHATE SERPL-MCNC: 3.6 MG/DL (ref 2.5–4.5)
PLATELET # BLD AUTO: 348 10E9/L (ref 150–450)
PLATELET # BLD EST: ABNORMAL 10*3/UL
POIKILOCYTOSIS BLD QL SMEAR: SLIGHT
POLYCHROMASIA BLD QL SMEAR: SLIGHT
POTASSIUM SERPL-SCNC: 5.1 MMOL/L (ref 3.4–5.3)
PROT SERPL-MCNC: 6 G/DL (ref 6.8–8.8)
RBC # BLD AUTO: 2.94 10E12/L (ref 4.4–5.9)
RBC INCLUSIONS BLD: SLIGHT
SODIUM SERPL-SCNC: 137 MMOL/L (ref 133–144)
TACROLIMUS BLD-MCNC: 4.8 UG/L (ref 5–15)
TME LAST DOSE: ABNORMAL H
WBC # BLD AUTO: 3.3 10E9/L (ref 4–11)

## 2020-09-21 PROCEDURE — 80076 HEPATIC FUNCTION PANEL: CPT | Performed by: PHYSICIAN ASSISTANT

## 2020-09-21 PROCEDURE — 21400000 ZZH R&B CCU UMMC

## 2020-09-21 PROCEDURE — 36415 COLL VENOUS BLD VENIPUNCTURE: CPT | Performed by: PHYSICIAN ASSISTANT

## 2020-09-21 PROCEDURE — 25000128 H RX IP 250 OP 636: Performed by: INTERNAL MEDICINE

## 2020-09-21 PROCEDURE — 36415 COLL VENOUS BLD VENIPUNCTURE: CPT | Performed by: STUDENT IN AN ORGANIZED HEALTH CARE EDUCATION/TRAINING PROGRAM

## 2020-09-21 PROCEDURE — 99232 SBSQ HOSP IP/OBS MODERATE 35: CPT | Performed by: NURSE PRACTITIONER

## 2020-09-21 PROCEDURE — 97530 THERAPEUTIC ACTIVITIES: CPT | Mod: GP | Performed by: REHABILITATION PRACTITIONER

## 2020-09-21 PROCEDURE — 25000132 ZZH RX MED GY IP 250 OP 250 PS 637: Performed by: NURSE PRACTITIONER

## 2020-09-21 PROCEDURE — 25000125 ZZHC RX 250: Performed by: NURSE PRACTITIONER

## 2020-09-21 PROCEDURE — 97116 GAIT TRAINING THERAPY: CPT | Mod: GP | Performed by: REHABILITATION PRACTITIONER

## 2020-09-21 PROCEDURE — 00000146 ZZHCL STATISTIC GLUCOSE BY METER IP

## 2020-09-21 PROCEDURE — 80048 BASIC METABOLIC PNL TOTAL CA: CPT | Performed by: PHYSICIAN ASSISTANT

## 2020-09-21 PROCEDURE — 85027 COMPLETE CBC AUTOMATED: CPT | Performed by: STUDENT IN AN ORGANIZED HEALTH CARE EDUCATION/TRAINING PROGRAM

## 2020-09-21 PROCEDURE — 80197 ASSAY OF TACROLIMUS: CPT | Performed by: NURSE PRACTITIONER

## 2020-09-21 PROCEDURE — 25000131 ZZH RX MED GY IP 250 OP 636 PS 637: Performed by: NURSE PRACTITIONER

## 2020-09-21 PROCEDURE — 85004 AUTOMATED DIFF WBC COUNT: CPT | Performed by: STUDENT IN AN ORGANIZED HEALTH CARE EDUCATION/TRAINING PROGRAM

## 2020-09-21 PROCEDURE — 83735 ASSAY OF MAGNESIUM: CPT | Performed by: PHYSICIAN ASSISTANT

## 2020-09-21 PROCEDURE — 97535 SELF CARE MNGMENT TRAINING: CPT | Mod: GO

## 2020-09-21 PROCEDURE — 84100 ASSAY OF PHOSPHORUS: CPT | Performed by: PHYSICIAN ASSISTANT

## 2020-09-21 PROCEDURE — 25000132 ZZH RX MED GY IP 250 OP 250 PS 637: Performed by: STUDENT IN AN ORGANIZED HEALTH CARE EDUCATION/TRAINING PROGRAM

## 2020-09-21 PROCEDURE — 40000275 ZZH STATISTIC RCP TIME EA 10 MIN

## 2020-09-21 PROCEDURE — 71250 CT THORAX DX C-: CPT

## 2020-09-21 PROCEDURE — 25000128 H RX IP 250 OP 636: Performed by: NURSE PRACTITIONER

## 2020-09-21 PROCEDURE — 25000132 ZZH RX MED GY IP 250 OP 250 PS 637: Performed by: INTERNAL MEDICINE

## 2020-09-21 PROCEDURE — 94640 AIRWAY INHALATION TREATMENT: CPT

## 2020-09-21 RX ORDER — VALGANCICLOVIR 450 MG/1
450 TABLET, FILM COATED ORAL DAILY
Status: DISCONTINUED | OUTPATIENT
Start: 2020-09-22 | End: 2020-09-24 | Stop reason: HOSPADM

## 2020-09-21 RX ADMIN — PIPERACILLIN AND TAZOBACTAM 3.38 G: 3; .375 INJECTION, POWDER, FOR SOLUTION INTRAVENOUS at 09:30

## 2020-09-21 RX ADMIN — MULTIPLE VITAMINS W/ MINERALS TAB 1 TABLET: TAB at 09:32

## 2020-09-21 RX ADMIN — MYCOPHENOLATE MOFETIL 500 MG: 500 TABLET ORAL at 17:53

## 2020-09-21 RX ADMIN — HYDRALAZINE HYDROCHLORIDE 75 MG: 50 TABLET, FILM COATED ORAL at 09:31

## 2020-09-21 RX ADMIN — ROSUVASTATIN CALCIUM 10 MG: 10 TABLET, FILM COATED ORAL at 19:57

## 2020-09-21 RX ADMIN — PIPERACILLIN AND TAZOBACTAM 3.38 G: 3; .375 INJECTION, POWDER, FOR SOLUTION INTRAVENOUS at 02:07

## 2020-09-21 RX ADMIN — PIPERACILLIN AND TAZOBACTAM 3.38 G: 3; .375 INJECTION, POWDER, FOR SOLUTION INTRAVENOUS at 19:57

## 2020-09-21 RX ADMIN — TACROLIMUS 2 MG: 1 CAPSULE ORAL at 17:53

## 2020-09-21 RX ADMIN — Medication 1 TABLET: at 09:31

## 2020-09-21 RX ADMIN — VALGANCICLOVIR 450 MG: 450 TABLET, FILM COATED ORAL at 09:32

## 2020-09-21 RX ADMIN — HYDRALAZINE HYDROCHLORIDE 75 MG: 50 TABLET, FILM COATED ORAL at 15:08

## 2020-09-21 RX ADMIN — ALBUTEROL SULFATE 2.5 MG: 2.5 SOLUTION RESPIRATORY (INHALATION) at 11:13

## 2020-09-21 RX ADMIN — MYCOPHENOLATE MOFETIL 500 MG: 500 TABLET ORAL at 09:32

## 2020-09-21 RX ADMIN — MAGNESIUM SULFATE IN WATER 2 G: 40 INJECTION, SOLUTION INTRAVENOUS at 17:55

## 2020-09-21 RX ADMIN — PIPERACILLIN AND TAZOBACTAM 3.38 G: 3; .375 INJECTION, POWDER, FOR SOLUTION INTRAVENOUS at 15:08

## 2020-09-21 RX ADMIN — HYDRALAZINE HYDROCHLORIDE 75 MG: 50 TABLET, FILM COATED ORAL at 19:57

## 2020-09-21 RX ADMIN — TACROLIMUS 2 MG: 1 CAPSULE ORAL at 09:32

## 2020-09-21 RX ADMIN — Medication 1 TABLET: at 17:53

## 2020-09-21 ASSESSMENT — ACTIVITIES OF DAILY LIVING (ADL)
ADLS_ACUITY_SCORE: 15

## 2020-09-21 ASSESSMENT — MIFFLIN-ST. JEOR: SCORE: 1545.67

## 2020-09-21 NOTE — PROGRESS NOTES
Care Coordinator Progress Note    Admission Date/Time:  8/31/2020  Attending MD:  Nasreen Nielson    Data  Chart reviewed, discussed with interdisciplinary team.   Patient with history of heart transplant 5/18/20 was admitted for:    Syncope, unspecified syncope type  Fever and chills  Hyperthermia-induced defect  Type 1 diabetes mellitus without complication (H)  Encounter for long-term (current) use of insulin (H)  Lung mass.    Assessment  Concerns with insurance coverage for discharge needs: None.  Current Living Situation: Patient lives with his father. Pt was independent with his cares prior to admission.   Support System: Supportive and Involved  Services Involved: None  Transportation at Discharge: Family or friend will provide  Barriers to Discharge: Medical plan of care, work up of new lung mass    Pt undergoing workup for RUL mass, awaiting biopsy results from 9/17. Pt currently on IV antibiotics, tentatively through tomorrow, but plan of care pending results. PT currently recommending home at discharge. Discharge needs still TBD, will continue to follow.    Plan  Anticipated Discharge Date: TBD  Anticipated Discharge Plan: Discharge to home with provider recommendations  CC will continue to monitor patient's medical condition and progress towards discharge.  Litzy Schneider RN BSN  6C Unit Care Coordinator  Phone number: [phone]  Pager: [phone]

## 2020-09-21 NOTE — PROGRESS NOTES
"CLINICAL NUTRITION SERVICES - REASSESSMENT NOTE     Nutrition Prescription    RECOMMENDATIONS FOR MDs/PROVIDERS TO ORDER:  None at this time.    Malnutrition Status:    Non-severe malnutrition in the context of acute illness/injury on chronic illness    Recommendations already ordered by Registered Dietitian (RD):  Discontinued free water flushes    Future/Additional Recommendations:  1. Continue regular diet, as ordered, to help encourage oral intake. Encourage intake of oral supplements and high kcal/protein options. Monitor K+ trends and potential need for a 3 g K+ diet restriction. K+ was 5.1 today (9/21).  2. Monitor phos labs.   3. Continue multivitamin with minerals to help ensure micronutrient needs are met.   4. Consider checking vitamin D status.    5. Monitor need for iron supplementation, if needed.  6. Monitor BG control. Hgb A1C of 7.4 on 8/19/20. BG was 160 on 9/21. Endo is following.   7. Continue prn Imodium.      EVALUATION OF THE PROGRESS TOWARD GOALS   Diet: Regular diet order. Ordered to receive orange or berry Boost Breeze at breakfast, lunch, and supper. Also, ordered to receive Gelatein 20 between meals.   Intake: Fair diet tolerance. Flowsheets (% intake) indicate pt consuming 50% with a fair appetite 9/14, 75% with a good appetite 9/15, 50-75% with a good appetite 9/16, 75% with a good appetite 9/17, 25-75% with a good appetite 9/18, % with a good appetite, and 100% with a good appetite. Per RN on 9/19, \"pt drank to 2 boost and 25 % of evening meal.\" Per team on 9/20, pt notes improved po intake.\" Per discussion with pt, eating about the same amount as PTA.  Factors that have affected his oral intake include taste changes (per pt, due to medications he has been on since Tx), food choices, and lack of appetite. States his taste changes may be getting a little better now that his prednisone was recently discontinued. States that he may eat less of some foods, such as breads, tortillas, " and doughy foods, as he has taste changes. Other foods seem to be fine. He dislikes the Gelatein oral supplement but likes the Boost Breeze.    Kcal counts:   9/14   212 kcals and 10 g protein. # Meals Recorded: 100% 2 diet lemonades, 75% mashed potatoes w/ gravy, 50% pineapple, banana, 25% grilled chicken. # Supplements Recorded: 0   9/15   734 kcals and 35 g protein. # Meals Recorded: 2 meals (First - 100% whole milk, 75% tomato soup, 50% grilled cheese, 25% pineapple) (Second - 75% broth, skim milk, 50% chicken noodle soup w/ saltines, wheat bread w/ butter) # Supplements Recorded: 75% 1 Boost Breeze    9/16   903 kcals and 38 g protein. # Meals Recorded: 2 meals (First - 100% cheerios, 2 whole milks, 25% banana) (Second - 100% milk, 75% egg noodles w/ steven) # Supplements Recorded: 50% 1 Boost Breeze    9/17   882 kcals and 36 g protein. # Meals Recorded: 100% chicken noodle soup w/ saltines, gardens salad w/ Maori, pineapple, whole milk, 30% hot turkey sandwich w/ gravy. # Supplements Recorded: 100% 1 Boost Breeze    9/18   1204 kcals and 45 g protein. # Meals Recorded: 3 meals ordered: 100% 2 ice creams, 2 whole milks, cream of wheat, coffee w/ 3 sugars, 3 splendas, and 3 creamers, orange, Meatloaf, cream of potato soup. 50% banana, 25% quesadilla w/ chicken. # Supplements Recorded: No intake recorded.    9/19   2691 kcals and 103 g protein. # Meals Recorded: 100% 3 meals ordered.# Supplements Recorded: 100% 2 Boost Breeze   9/20   1429 kcals and 73 g protein. # Meals Recorded: 2 meals (First - 100% pudding, whole milk, 25% breakfast sandwich w/ egg, cheese & sausage) (Second - 100% whole milk, pineapple, omelet w/ ham, onion, mushroom, spinach & cheese, sausage ting) # Supplements Recorded: 100% 1 Boost Breeze - Note, lunch meal not recorded and not all oral supplements recorded.    9/21 Pending    * Pt consumed a seven-day average of 1151 kcals and 49 g protein daily. Estimated nutrition needs of  "1231-3184 kcals/day (25 - 30 kcals/kg) and  64-77 grams protein/day (1 - 1.2 grams of pro/kg).    Recent Nutrition Support:    - TF regimen (NDT placed with a bridle on 9/14): Nutren 1.5 at 65 mL/hr x 12 hrs (6p-6a) provides 780 mL TF, 1170 kcals, 53 g protein, 137 g CHO, 593 mL H2O, and no fiber daily. TFs started on 9/14 and TFs were stopped on 9/20 due to improving oral intake. TFs were decreased on 9/17 to help encourage oral intake. Feeding tube was removed today (9/21) due to improving oral intake.   - Feeding Tube Flushes: 80 mL Q 4 hrs  - Intake: Pt received a seven-day TF average of 550 mL/day. This provided 825 kcals and 37 g protein and does not meet pt's estimated needs noted above.    NEW FINDINGS   Wt Hx: 94.2 kg (9/4/19), 88.9 kg (4/29/20), 83.7 kg (6/15/20), 80.5 kg (8/21/20), 82 kg (8/31, admit), 78.6 kg (9/13/20), 81 kg (9/21) - Pt has lost 8.9% of his body wt over the last approximate five months.   GI: Pt having one to eight stools daily. Stools are soft and brown. Last stool on 9/20. Per team on 9/20, \"He has had looser stools, and denies nausea and vomiting.\" Abdominal discomfort 9/17.     MALNUTRITION  % Intake: Not meeting this criteria.     % Weight Loss: Up to 10% in 6 months (non-severe)  Subcutaneous Fat Loss: None observed, but difficult to assess with wt status  Muscle Loss: None observed, but difficult to assess with wt status  Fluid Accumulation/Edema: Trace to mild, per chart.  Malnutrition Diagnosis: Non-severe malnutrition in the context of acute illness/injury on chronic illness    Previous Goals   Total average nutrition intake to meet 8635-0872 kcals/day (25 - 30 kcals/kg) and 64-77 grams protein/day (1 - 1.2 grams of pro/kg).  Evaluation: Meeting with TFs + oral intake.     Previous Nutrition Diagnosis  Inadequate oral intake related to decreased/poor appetite as evidenced by pt report and eating 0-50% meals per RN flowsheets since 9/9, and skipping meals.   Evaluation: " Unresolved. Updated below.     CURRENT NUTRITION DIAGNOSIS  Inadequate oral intake related to taste changes, food choices, and lack of appetite as evidenced by pt consumed a seven-day average of 1151 kcals and 49 g protein daily which does not meet estimated nutrition needs of 7217-6712 kcals/day (25 - 30 kcals/kg) and 64-77 grams protein/day (1 - 1.2 grams of pro/kg).    INTERVENTIONS  Implementation  Nutrition education for nutrition relationship to health/disease: Encouraged oral intake adequacy and intake of oral supplements. Discussed potential nutrition strategies that may help with his taste changes. Provided handout, Coping with Taste Changes.   Feeding tube flush: Discontinued free water flushes.  Medical food supplement therapy: Discontinued Gelatein Plus    Goals  Patient to consume % of nutritionally adequate meal trays TID, or the equivalent with supplements/snacks.    Monitoring/Evaluation  Progress toward goals will be monitored and evaluated per protocol.      Nutrition will continue to follow.      Dorcas Meek, MS, RD, LD, [facility]   6C Pgr: [phone]

## 2020-09-21 NOTE — PROGRESS NOTES
Calorie Count  Intake recorded for: 9/20  Total Kcals: 1429 Total Protein: 73g  Kcals from Hospital Food: 1429  Protein: 73g  Kcals from Outside Food (average):0 Protein: 0g  # Meals Recorded: 2 meals (First - 100% pudding, whole milk, 25% breakfast sandwich w/ egg, cheese & sausage)      (Second - 100% whole milk, pineapple, omelet w/ ham, onion, mushroom, spinach & cheese, sausage ting)  # Supplements Recorded: 100% 1 Boost Breeze

## 2020-09-21 NOTE — PROGRESS NOTES
"                     Diabetes Consult Daily  Progress Note          Assessment/Plan:    Mariusz Sharp \"Red\" is a 59 year old male with history of CAD, LV thrombus, CKD Stage III, PAF, DM Type II, and ICM s/p HM III LVAD as BTT (2018) with subsequent OHT 5/18/20, presenting with progressive SOB, cough, and fever, found to have right hilar lung mass concerning for malignancy versus pneumonia. S/P bronch with biopsy 9/15.      Assessment:   1)Type II Diabetes Mellitus,  2) Enteral feed-induced hyperglycemia , now discontinued       Plan:   - Lantus 15 units at bedtime, increase to 18 units at bedtime  - novolog 1 unit per 10 grams of CHO, changed back to 1 unit per 6 grams of CHO starting with lunch     - novolog high intensity sliding scale before meals and at bedtime  - monitor glucose before meals, HS and 0200              -hypoglycemia protocol  - diet with carb counting protocol   -diabetes education needs to be reassessed prior to discharge.   -will continue to follow                             Outpatient diabetes follow up: TBD  Plan discussed with patient and primary team per this note           Interval History:   The last 24 hours progress and nursing notes reviewed.      Tube feeds were discontinued last night, after being cycled   Blood sugars are moderately controlled on current regime.  Am glucose 160 and glucose and at ~ 0200, 148, will increate basal insulin by 20%  Am glucose of 160, was off lab draw,  Sliding scale insulin given  ~ 5 hours later. Timing of insulin is a factor to the glucose of 415 at 1228.  -is drinking 2-3 supplements in between meals per day ( suspect may have missed a supplement as well)  Appetite is reproted as good, denies n/v  Endorse shortness of breath with PT today       Current nutritional intake and type: Orders Placed This Encounter      Regular Diet Adult  Supplements wth meals, but is eating or drinking between meals  - tube feeds  Now discontinued     PTA Diabetes " "Regimen:   CGM: Dexcom G6.  Lantus 32 units daily HS  Novolog 1:8g CHO and high intensity sliding scale AC/HS.    Recent Labs   Lab 09/21/20  0527 09/21/20  0211 09/20/20  2115 09/20/20  1807 09/20/20  1740 09/20/20  1408 09/20/20  1050  09/20/20  0605  09/19/20  0513  09/18/20  1844  09/18/20  0457  09/17/20  1842   *  --   --   --   --   --   --   --  336*  --  171*  --  139*  --  171*  --  289*   BGM  --  148* 294* 281* 272* 108* 143*   < >  --    < >  --    < >  --    < >  --    < >  --     < > = values in this interval not displayed.               Review of Systems:   See interval hx          Medications:     Orders Placed This Encounter      Regular Diet Adult       Physical Exam:  Gen: Alert, sitting in chair, NAD   HEENT:  mucous membranes are moist  Resp: non-labored  Ext: moving all extremities   Neuro:oriented x3, communicating clearly  BP (!) 130/91 (BP Location: Left arm, Cuff Size: Adult Regular)   Pulse 109   Temp 98.5  F (36.9  C) (Oral)   Resp 18   Ht 1.626 m (5' 4\")   Wt 81 kg (178 lb 8 oz)   SpO2 97%   BMI 30.64 kg/m             Data:     Lab Results   Component Value Date    A1C 7.4 08/19/2020    A1C 7.2 08/13/2020    A1C 7.8 06/15/2020    A1C 8.0 05/12/2020    A1C 9.5 07/05/2019              CBC RESULTS:   Recent Labs   Lab Test 09/21/20  0527   WBC 3.3*   RBC 2.94*   HGB 8.1*   HCT 26.7*   MCV 91   MCH 27.6   MCHC 30.3*   RDW 14.6        Recent Labs   Lab Test 09/21/20  0527 09/20/20  0605    135   POTASSIUM 5.1 5.1   CHLORIDE 110* 108   CO2 20 20   ANIONGAP 7 7   * 336*   BUN 37* 46*   CR 1.86* 2.29*   ARLENE 8.5 8.1*     Liver Function Studies -   Recent Labs   Lab Test 09/21/20  0527   PROTTOTAL 6.0*   ALBUMIN 2.1*   BILITOTAL 0.4   ALKPHOS 84   AST 52*   ALT 82*     Lab Results   Component Value Date    INR 1.06 08/26/2020    INR 1.16 06/04/2020    INR 1.16 06/01/2020    INR 1.54 05/18/2020    INR 2.21 05/18/2020    INR 1.42 05/18/2020    INR 2.19 05/18/2020 "    INR 2.21 05/15/2020    INR 2.76 05/14/2020    INR 2.88 05/13/2020    INR 2.47 05/12/2020    INR 2.5 05/11/2020         I spent a total of 25 minutes bedside and on the inpatient unit managing the glycemic care of Mariusz Sharp. Over 50% of my time on the unit was spent counseling the patient  and/or coordinating care regarding .  See note for details.      Shelley Vargas -[phone]  Diabetes Management job code [id number]

## 2020-09-21 NOTE — PLAN OF CARE
PT - 6C  Discharge Planner PT   Patient plan for discharge: Home  Current status: Transfers sit<>stand IND, ambulates >200' SBA with no assistive device, occasional use of IV pole when fatigued. After first 100' bout of gait, pt extremely dyspneic and coughing while seated in therapy gym, sats >98% on RA but declines stairs, ambulates back to room SBA and requests RT treatment, RN aware.    Barriers to return to prior living situation: Medical status, decreased activity tolerance  Recommendations for discharge: Home  Rationale for recommendations: Pt is nearing baseline for functional mobility.  Entered by: Hugo Franz 09/21/2020 11:15 AM

## 2020-09-21 NOTE — PLAN OF CARE
OT 6C  Discharge Planner OT   Patient plan for discharge: Return home (no assist available)  Current status: SBA for STS from high back chair. SBA with set up for g/h standing at the sink. SBA to walk to and from bathroom, toilet while standing, and manage garments.   Barriers to return to prior living situation: acute medical needs, decreased activity tolerance, decreased exercise endurance, decreased IND with ADLs  Recommendations for discharge: Home with assist  Rationale for recommendations: Pt is below baseline and would benefit from continued skilled therapy to increase activity tolerance and independence with ADLs.          Entered by: Madelyn Miranda 09/21/2020 4:20 PM

## 2020-09-21 NOTE — PLAN OF CARE
D:pt drank to 2 boost and 25 % of evening meal  A:pt looking forward to having tube out  P:[facility]

## 2020-09-21 NOTE — PROGRESS NOTES
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      PLAN:  - repeat chest CT w/o contrast today  - d/w nutrition -- will pull NJT today      Fever, secondary to post-obstructive pneumonia  Cough  Cryptogenic RUL mass vs consolidation in the RUL and right hilum with narrowing of multiple RUL bronchi (since 7/29)  Postobstructive pneumonia  Diagnosed with rhinovirus 8/2020.  Completed Work-up:  - 8/31:  Fungitell assay was low-grade positive/stable, and corresponding galactomannan antigen assay was negative.  COVID negative.  - 9/1:  Strep pneumo, CDiff, cryptococcal antigen, toxoplasma, legionella negative.  CMV and EBV negative.  - 9/2:  PJP PCR of sputum negative.  CT Chest showing new RUL mass concerning for PTLD vs infection, new since last chest CT 7/29.  - 9/4:  Aspergillus, coccidiodes, histoplasma, and blastomycis negative.  - 9/7:  BCs negative.  - 9/8:  Bronchial lavage of right lung negative for malignancy but GMS stain positive for rare pseudohyphae.  Nocardia negative.  PJP negative.  FNA negative to date.  FNA biopsy negative for malignancy.  - 9/9:  CDiff negative, BCs negative.  - 9/12:  Absolute CD3 618, CD4 359, CD8 114, CD3 Mature T 71, CD4:CD8 ratio 3.15, CD4 [place] T 41, CD8 Suppressor T 13.  - 9/13:  EBV negative.  IgA 108, IgG 510, IgM 29.  Repeat chest CT showed increase in size of RUL mass and post-obstructive PNA.  - 9/14:  " COVID negative.  - 9/15:  Bronchial lavage of right lung negative for malignancy and negative for PJP and fungal orgamisms.  - 9/17:  CDiff negative.  RUL culture negative to date, nocardia negative to date.  Leukemia/lymphoma eval negative.  FNA negative for malignancy.  Bronchial lavage of right lung nondiagnostic. Tissue from RUL showing abiotrophia defectiva.     Pending Work-up:  - 9/8:  Bronchial lavage cultures in process, fungal DNA 28S ribosomal in process, FNA cultures in process.  - 9/14:  Viral respiratory cultures in process  - 9/15:  Bronchial lavage cultures in process  - 9/17:  Right upper lung biopsy/cultures in process.  Actinomyces, legionella, nocardia, KOH in process.    Antibiotic summary:  - On vanc/zosyn from 8/31 to 9/1  - Cefdnir added 9/1, stopped 9/13  - Posaconazole 9/4-9/13, no beneficial effect on either fevers nor the size of the RUL mass despite therapeutic levels  - Atovaquone 9/12-9/15   - Zosyn restarted 9/13 (when cefdinir and posaconazole were stopped)     Plan:  - Appreciate Transplant ID and IR Pulm consults  - Continue to monitor cultures, if remain negative and treatment plan unclear, will discuss further with pulm/thoracic team  - Continue empiric Zosyn through 9/22/20 to complete a ten day course for a presumptive post-obstructive (or aspiration) right lung pneumonia  - Once the Zosyn course is completed, on 9/23/20, resume cefdinir 300mg PO BID through 10/1/20 to complete a six week (combined serial antibiotics) course as treatment for chronic prostatitis  - Per Pulm, recommend awaiting growth from 9/17 biopsies before referring to thoracics as this is going to be a difficult site for them to access.  - Per Transplant ID -- Await further results from the 9/17/20 repeat BAL and hilar lesion transbronchial biopsy.  If no useful answer is obtained, would then repeat the chest CT scan again and if the lesion is still enlarging, proceed to a surgical (perhaps mediastinoscopic  versus VATS) approach to biopsy.      NICM, s/p OHT 5/18/20  His post-operative course was c/b NSVT (with no hemodynamic compromise), leukocytosis with C Acnes growing from his former LVAD site (thought to be a contaminant, but treated with IV vanco --> po doxy through 6/1/20, 14d course total), and in the setting of donor blood growing S anginosus and Veillonella (also thought to be a contaminant, but treated with Vanco/zosyn --> Vanco/Flagyl for a total of 7 days).  He also had hyperkalemia, which improved following kayexalate and stopping bactrim.  He ultimately discharged 5/29.    Biopsies have remained negative for rejection.  Last SELVIN 8/31 showed normal graft function with LVEF >70% and normal RV size/function.  RHC 8/26 showed normal biventricular filling pressures, with RA 3, mPA 15, PCW 10, and CI 2.63.    Serostatus:  - CMV D+/R-  - EBV D-/R+  - Toxo D-/R-     Immunosuppression:  - MMF decreased to 500mg twice daily on 9/8 due to low ImmuKnow levels  - tacro, goal level 8-10.  Tacro level from yesterday was down to 4.4, so his tacro dose was increased to 2mg twice daily.  Repeat level today is in process, and will reflect a ~12hr trough.  Will review results and continue to trend level.     Graft function:  - BP:  120-150/90-110s --> increased hydralazine to 75mg TID 9/20, will continue to monitor closely  - HRs:  100s-130s  - fluid status:  euvolemic, not on diuretics      PPx:  - CAV:  Aspirin held due to hematuria.  Continue crestor 10mg daily.  - CMV:  valcyte 450mg EOD (renally dosed), x6 months (through 11/2020)  - Osteoporosis:  Calcium/vitamin D supplements  - GI:  Pantoprazole 40mg daily     Health maintenance:  - scheduled for month 4 surveillance 9/30.  Note that cor angio has been deferred --> will need to revisit pending renal function  - needs flu shot       ANDREW on CKD  Hyponatremia, hypovolemic  PTA creatinine ranged 1.3-1.7.  UA showing protein 30, RBC 53.  Creatinine trended up to 3.88,  "and has since trended down.  Multiple renal ultrasounds negative for obstructive stones and hydronephrosis.  Urine sodium 52 and FeNA 2.5%, suggesting intrinsic renal process.  Per renal team, he may have developed some ATN due to a combination of mild volume depletion and tacrolimus.    - renal consult, appreciate rec's  - continue to trend BMP  - avoid hypovolemia, encouraging PO intake        OTHER:  NSVT, resolved:  Postoperative, with no hemodynamic compromise.  Elevated PSA with suspected chronic prostatitis:  Prostate MRI showed signs of BPH.  PSA was 10.8 8/2020, 8.8 9/2020.  Completing antibiotic course as noted above.  DMII:  Continue insulin management per endocrine, appreciate consult and rec's.  Insulin gtt stopped 9/19.  Stopped TFs 9/20, updated endo.  Pulling NJT today.  Diarrhea, intermittent, improving:  CDiff and CMV were negative on admission, and enteric panel negative 9/9, CDiff again negative 9/17.  Diarrhea attributed to TFs.  PRN imodium ordered.  HTN:  PTA amlodipine held, BPs controlled on hydralazine 50mg TID.      FEN: regular diet, boost shakes  PROPHY:  Ambulate, SQH stopped due hematuria  LINES:  PIV  DISPO:  TBD -- note that SW note from 9/16 states that his insurance does not cover TCU  CODE STATUS:  Full code    =============================================================    Interval History/ROS:   Mr. Sharp states that he feels ok overall. He had a \"sharp\" pain, rated 6-7/10, at his right upper chest yesterday. The pain was relieved with tylenol, and has not recurred. His breathing has been stable, but still not yet at his baseline. He has been walking, with no exertional concerns today. His leg weakness has resolved. He is eating, with improving appetite and denies nausea, vomiting, and constipation. He still has loose stools. He feels well from a fluid standpoint. He otherwise denies palpitations, new headaches, acute vision changes, fevers, chills, worsening cough, sore " "throat, and signs of bleeding.       Last 24 hr care team notes reviewed.   ROS:  4 point ROS including Respiratory, CV, GI and , other than that noted in the HPI, is negative.     Medications: Reviewed in EPIC.     Physical Exam:   BP (!) 130/91 (BP Location: Left arm, Cuff Size: Adult Regular)   Pulse 109   Temp 98.5  F (36.9  C) (Oral)   Resp 18   Ht 1.626 m (5' 4\")   Wt 81 kg (178 lb 8 oz)   SpO2 97%   BMI 30.64 kg/m    GENERAL: Appears alert and oriented times three. Lying in bed, NAD.  HEENT: Eye symmetrical and free of discharge bilaterally. Mucous membranes moist and without lesions.  NECK: Supple and without lymphadenopathy. JVD appears negative when lying at 45 .   CV: RRR, S1S2 present without murmur, rub, or gallop.   RESPIRATORY: Respirations regular, even, and unlabored. Lungs CTA throughout.   GI: Soft and non distended with normoactive bowel sounds present in all quadrants. No tenderness, rebound, guarding. No organomegaly.   EXTREMITIES: Trace bilateral LE edema with pitting above ankles. 2+ bilateral pedal pulses.   NEUROLOGIC: Alert and orientated x 3. CN II-XII grossly intact. No focal deficits.   MUSCULOSKELETAL: No joint swelling or tenderness.   SKIN: No jaundice. No rashes or lesions.     Data:  CMP  Recent Labs   Lab 09/21/20  0527 09/20/20  0605 09/19/20  0513 09/18/20  1844 09/18/20  0457    135 135 136 134   POTASSIUM 5.1 5.1 5.1 5.1 5.2   CHLORIDE 110* 108 108 108 107   CO2 20 20 21 21 20   ANIONGAP 7 7 7 7 7   * 336* 171* 139* 171*   BUN 37* 46* 57* 60* 66*   CR 1.86* 2.29* 2.94* 3.45* 3.50*   GFRESTIMATED 39* 30* 22* 18* 18*   GFRESTBLACK 45* 35* 26* 21* 21*   ARLENE 8.5 8.1* 8.4* 8.7 8.4*   MAG 1.6 1.6 1.9  --  1.9   PHOS 3.6 2.2* 2.9  --  1.5*   PROTTOTAL 6.0* 5.9* 5.6*  --  5.8*   ALBUMIN 2.1* 2.0* 1.9*  --  1.8*   BILITOTAL 0.4 0.3 0.3  --  0.4   ALKPHOS 84 100 95  --  108   AST 52* 105* 87*  --  69*   ALT 82* 109* 74*  --  66     CBC  Recent Labs   Lab " 09/21/20  0527 09/20/20  0605 09/19/20  0513 09/18/20  0457   WBC 3.3* 3.8* 5.1 4.0   RBC 2.94* 2.99* 2.90* 2.85*   HGB 8.1* 8.2* 7.8* 7.7*   HCT 26.7* 27.7* 26.5* 25.6*   MCV 91 93 91 90   MCH 27.6 27.4 26.9 27.0   MCHC 30.3* 29.6* 29.4* 30.1*   RDW 14.6 14.5 14.1 13.6    [phone]       Patient discussed with Dr. Stone.      Karen Benson, DNP, NP-BC, CHFN  Advanced Heart Failure Nurse Practitioner  [facility]

## 2020-09-21 NOTE — PROGRESS NOTES
[facility]    Transplant Infectious Diseases Inpatient Progress note      Mariusz Sharp MRN# [id number]   YOB: 1960 Age: 59 year old   Date of Admission: 8/31/2020 12:29 PM  Transplant: 5/18/2020 (Heart), Postoperative day: 126            Recommendations:   1. Continue zosyn for now.   2. May switch to PO augmentin.   3. Consider evaluation for GERD and aspiration given hiatal hernia and dilated end of esophagus per CT.         Summary of Presentation:   Transplants:  5/18/2020 (Heart), Postoperative day:  126     This patient is a 59 year old male with ICMP s/p OHT basiliximab for induction, TAC/MMF for maintenance IS.   Presented with one day of cough, fever, shortness of breath and post tussive LOC.         Active Problems and Infectious Diseases Issues:   1. Likely aspiration pneumonia  The CT showed dilated distal end of esophagus and hiatal hernia which may be resulting in aspiration and aspiration pneumonia.   The CT from 9/21/2020 has not been read yet but seems to have improved on zosyn.   Will follow final CT reading.     The BALs on 9/8/2020, 9/15/2020 and 9/17/2020 were all unremarkable except for the growth of Abiotrophia defectiva (oral simona).   The TBBx were all non-diagnostic/unremarkable.     It seems that the respiratory status and the radiological findings have improved with zosyn.   The improvement with zosyn and the presence of oral simona further argue in favor of aspiration. Post-obstructive pneumonia I possible but will wait for the official reading of the repeat CT to reassess if mass or obstructing LN is still present.     2. The patient was being treated for chronic prostatitis with cefdinir.   May resume cefdinir as planned till 10/1/2020 (total of 6 weeks) once he's done with the current therapy of aspiration vs pos-obstructive pneumonia.         Old Problems and Infectious Diseases Issues:   1. 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   2. C acnes in the extracted VAD; treated with vancomycin then doxycycline.     Other Infectious Disease issues include:  - PCP prophylaxis: bactrim caused hyperkalemia, then pentamidine. Now off PJP ppx.   - Serostatus: CMV D+/R-, EBV D-/R+, HSV1?/2?, VZV +, Toxo D-/R-     On VGCV ppx per protocol.   - Gamma globulin status: 510 as of 9/13/2020       Attestation:  I interviewed the patient and obtained history from the patient, and by reviewing the patient's chart including outside records, microbiological data, and radiological data. All data are summarized in this notes.  Tavares Rodriguez MD   Pager: [phone]  09/21/2020      Interim History and Events:   No fever, no LOC.   With loose stool, but no N/V and no abdominal pain.   Still feels deconditioned and easily short of breath.     HPI:  In summary:  Presented with 24 hr of dyspnea, fever, and cough with post tussive LOC.   He was found to have RUL consolidation vs obstructive LN vs mass.   Non-invasive workup with urine and blood Histo and Blasto Ag were negative, A galactomannan negative, BD glucan low positive at 86 and 90.  He was treated with posaconazole empirically from 9/4/2020 till 9/13/202 when he spiked fever and CT showed worsening of the mass/consolidation. Zosyn was initiated on 9/13/2020 for possible post obstructive pneumonia with resolution of fever and posaconazole was discontinued due to lack of effectiveness.   Bronchoscopy with TBBx on 9/8/2020 and 9/15/2020 were both not diagnostic. FNA on 9/17/202 and BAL the same day were also not significant.   The biopsy from 9/8/2020 was also sent for NGS by PCR of 28S DNA and was negative for fungal infection.   EBV was not detected.       ROS:  As mentioned in the interim history otherwise negative by reviewing constitutional symptoms, central and peripheral neurological systems, respiratory system, cardiac system, GI system,  system,  musculoskeletal, skin, allergy, and lymphatics.                 Pysical Examination:  Temp: 98  F (36.7  C) Temp src: Oral BP: 118/86 Pulse: 117   Resp: 20 SpO2: 98 % O2 Device: None (Room air)    Vitals:    09/17/20 0910 09/18/20 0644 09/19/20 0111 09/20/20 0140   Weight: 83.5 kg (184 lb 1.6 oz) 84.4 kg (186 lb) 85.5 kg (188 lb 6.4 oz) 83.9 kg (184 lb 14.4 oz)    09/21/20 0537   Weight: 81 kg (178 lb 8 oz)     Constitutional: awake, alert, cooperative, no apparent distress and appears at stated age, well nourished.   Head, ENT, Eyes, and Neck: Normocephalic, sinuses non-tender to palpation, external ears without lesions, moist buccal mucosa without oral thrush or ulcers, tonsils without swelling, erythema, or exudate, no tenderness palpating teeth, good dentition, gums without necrosis or abscesses.   PERRL, EOMI, pink conjunctivae, non-icteric sclera.   Neck supple without rigidity, no cervical/axillary/inguinal LA bilaterally.    Neurologic: Patient is moving all extremities without focal deficit, no focal sensory loss.   Lungs: CTA bilaterally, no accessory muscle use, no dullness to percussion and no abnormal tactile fremitus.   CVS: RRR, normal S1/S2, no murmur, PMI was not displaced.   Abdomen: non-tender, non-distended, no masses, no bruit, no shifting dullness, normal BS.   Extremities: +4 pitting edema of bilateral lower extremities, no ulcers, normal ROM of all joints, no swelling or erythema of any of joints and no tenderness to palpation.   Skin: no induration, fluctuation or discharge, and no rash       Medications:  Medications that Require Transfusion:     dextrose       - MEDICATION INSTRUCTIONS -       Scheduled Medications:     calcium carbonate 600 mg-vitamin D 400 units  1 tablet Oral BID w/meals     hydrALAZINE  75 mg Oral TID     insulin aspart   Subcutaneous QAM     insulin aspart   Subcutaneous Daily with lunch     insulin aspart  1-10 Units Subcutaneous TID AC     insulin aspart  1-7 Units  Subcutaneous At Bedtime     insulin aspart   Subcutaneous Daily with supper     insulin glargine  18 Units Subcutaneous At Bedtime     lidocaine  1 patch Transdermal Q24h    And     lidocaine   Transdermal Q8H     multivitamin w/minerals  1 tablet Oral Daily     mycophenolate  500 mg Oral BID IS     piperacillin-tazobactam  3.375 g Intravenous Q6H     rosuvastatin  10 mg Oral At Bedtime     sodium chloride (PF)  3 mL Intracatheter Q8H     tacrolimus  2 mg Oral QPM     tacrolimus  2 mg Oral QAM     [START ON 9/22/2020] valGANciclovir  450 mg Oral Daily         Laboratory Data:   Absolute CD4   Date Value Ref Range Status   09/12/2020 359 (L) 441 - 2,156 cells/uL Final       Inflammatory Markers    Recent Labs   Lab Test 07/23/18  0645   CRP 11.0*       Immune Globulin Studies     Recent Labs   Lab Test 09/13/20  0612   *   IGM 29*   *          Metabolic Studies       Recent Labs   Lab Test 09/21/20  0527 09/20/20  0605 09/19/20  0513 09/18/20  1844 09/18/20  0457 09/17/20  1842  09/13/20  0804  09/01/20  0658  08/19/20  0558  06/15/20  0826    135 135 136 134 134   < >  --    < > 134   < > 139   < > 139   POTASSIUM 5.1 5.1 5.1 5.1 5.2 4.8   < >  --    < > 3.8   < > 3.6   < > 4.8   CHLORIDE 110* 108 108 108 107 106   < >  --    < > 104   < > 107   < > 110*   CO2 20 20 21 21 20 21   < >  --    < > 22   < > 25   < > 21   ANIONGAP 7 7 7 7 7 7   < >  --    < > 9   < > 6   < > 8   BUN 37* 46* 57* 60* 66* 54*   < >  --    < > 21   < > 27   < > 98*   CR 1.86* 2.29* 2.94* 3.45* 3.50* 3.35*   < >  --    < > 1.38*   < > 1.31*   < > 1.89*   GFRESTIMATED 39* 30* 22* 18* 18* 19*   < >  --    < > 56*   < > 60*   < > 38*   * 336* 171* 139* 171* 289*   < >  --    < > 118*   < > 69*   < > 120*   A1C  --   --   --   --   --   --   --   --   --   --   --  7.4*   < > 7.8*   ARLENE 8.5 8.1* 8.4* 8.7 8.4* 8.4*   < >  --    < > 8.5   < > 8.4*   < > 9.0   PHOS 3.6 2.2* 2.9  --  1.5*  --    < >  --   --   --     < >  --    < > 4.4   MAG 1.6 1.6 1.9  --  1.9  --    < >  --    < > 1.4*   < >  --    < > 1.7   LACT  --   --   --   --   --   --   --  0.7  --  0.9   < >  --    < >  --    CKT  --   --   --   --   --   --   --   --   --   --   --   --   --  132    < > = values in this interval not displayed.       Hepatic Studies    Recent Labs   Lab Test 09/21/20  0527 09/20/20  0605 09/19/20  0513 09/18/20  0457 09/17/20  0440 09/16/20  0541 09/12/20  1204  09/01/20  0658   BILITOTAL 0.4 0.3 0.3 0.4 0.4 0.4  --    < > 0.9   ALKPHOS 84 100 95 108 114 116  --    < > 62   ALBUMIN 2.1* 2.0* 1.9* 1.8* 1.5* 1.7*  --    < > 2.4*   AST 52* 105* 87* 69* 87* 125*  --    < > 16   ALT 82* 109* 74* 66 73* 79*  --    < > 11   LDH  --   --   --   --   --   --  380*  --  228*    < > = values in this interval not displayed.       Pancreatitis testing    Recent Labs   Lab Test 09/01/20  0658 06/15/20  0826 05/18/20  0857 05/18/20  0010 08/06/18  1025   AMYLASE  --   --  56 65  --    TRIG 143 73  --   --  246*       Hematology Studies      Recent Labs   Lab Test 09/21/20  0527 09/20/20  0605 09/19/20  0513 09/18/20  0457 09/17/20  0440 09/16/20  0541  09/05/20  0549 09/04/20  0543 09/03/20  0526 09/02/20  0551 09/01/20  0658   WBC 3.3* 3.8* 5.1 4.0 3.3* 3.4*   < > 5.3 3.8* 3.1* 2.8* 3.4*   ANEU 2.2  --   --   --   --   --   --  4.1 3.1 2.4 1.8 2.6   ALYM 0.7*  --   --   --   --   --   --  0.8 0.3* 0.3* 0.7* 0.5*   MARTHA 0.2  --   --   --   --   --   --  0.3 0.2 0.2 0.2 0.3   AEOS 0.0  --   --   --   --   --   --  0.0 0.2 0.1 0.1 0.0   HGB 8.1* 8.2* 7.8* 7.7* 7.9* 8.5*   < > 8.3* 8.7* 8.3* 8.1* 7.9*   HCT 26.7* 27.7* 26.5* 25.6* 25.9* 26.4*   < > 26.5* 27.5* 27.0* 26.7* 26.0*    380 411 348 380 404   < > 353 376 333 354 336    < > = values in this interval not displayed.       Arterial Blood Gas Testing    Recent Labs   Lab Test 05/24/20  1137 05/21/20  0756 05/21/20  0445 05/20/20  2340  05/19/20  0943 05/19/20  0812 05/19/20  0324  05/19/20  0009  05/18/20  1835   PH  --   --   --   --   --  7.40 7.40 7.39 7.35  --  7.43   PCO2  --   --   --   --   --  27* 33* 38 39  --  33*   PO2  --   --   --   --   --  131* 124* 132* 153*  --  323*   HCO3  --   --   --   --   --  17* 21 23 21  --  22   O2PER 24.0 2L 2L 2L   < > 40 40  40 40  40 40  40   < > 100    < > = values in this interval not displayed.        Urine Studies     Recent Labs   Lab Test 09/16/20  1620 09/14/20 2020 08/31/20  1615 08/20/20  1630 08/18/20  1404   URINEPH 5.5 5.5 5.5 5.5 5.5   NITRITE Negative Negative Negative Negative Negative   LEUKEST Trace* Negative Negative Small* Moderate*   WBCU 5 4 1 12* 28*       Vancomycin Levels     Recent Labs   Lab Test 05/27/20  0837 05/24/20  1726 05/22/20  1704 05/20/20  1145   VANCOMYCIN 26.6* 11.9 12.5 15.3       Tobramycin levels     No lab results found.    Gentamicin levels    No lab results found.    Tacrolimus levels    Invalid input(s): TACROLIMUS, TAC, TACR  Transplant Immunosuppression Labs Latest Ref Rng & Units 9/21/2020 9/20/2020 9/19/2020 9/18/2020 9/18/2020   Tacro Level 5.0 - 15.0 ug/L 4.8(L) 4.4(L) - - 7.2   Tacro Level - Not Provided Not Provided - - Not Provided   Creat 0.66 - 1.25 mg/dL 1.86(H) 2.29(H) 2.94(H) 3.45(H) 3.50(H)   BUN 7 - 30 mg/dL 37(H) 46(H) 57(H) 60(H) 66(H)   WBC 4.0 - 11.0 10e9/L 3.3(L) 3.8(L) 5.1 - 4.0   Neutrophil % 66.7 - - - -   ANEU 1.6 - 8.3 10e9/L 2.2 - - - -       Microbiology:  BAL 9/17/2020 A defectiva  BAL 9/15/2020 negative   BAL 9/8/2020 negative.   Last check of C difficile  C Diff Toxin B PCR   Date Value Ref Range Status   09/17/2020 Negative NEG^Negative Final     Comment:     Negative: C. difficile target DNA sequences NOT detected, presumed negative   for C.difficile toxin B or the number of bacteria present may be below the   limit of detection for the test.  FDA approved assay performed using [employer] GeneXpert real-time PCR.  A negative result does not exclude actual disease due  to C. difficile and may   be due to improper collection, handling and storage of the specimen or the   number of organisms in the specimen is below the detection limit of the assay.         Virology:  CMV viral loads  No results found for: 87010, 15717, 63539, 70976, CMVQAL  CMV viral loads    Recent Labs   Lab Test 09/13/20  0612 09/01/20  0658   CSPEC Plasma Plasma   CMVLOG Not Calculated Not Calculated       CMV viral loads    Log IU/mL of CMVQNT   Date Value Ref Range Status   09/13/2020 Not Calculated <2.1 [Log_IU]/mL Final   09/01/2020 Not Calculated <2.1 [Log_IU]/mL Final   08/19/2020 Not Calculated <2.1 [Log_IU]/mL Final   08/13/2020 Not Calculated <2.1 [Log_IU]/mL Final   06/15/2020 Not Calculated <2.1 [Log_IU]/mL Final       CMV resistance testing  No lab results found.  No results found for: CMVCID, CMVFOS, CMVGAN     No results found for: H6RES    EBV DNA Copies/mL   Date Value Ref Range Status   09/01/2020 EBV DNA Not Detected EBVNEG^EBV DNA Not Detected [Copies]/mL Final   08/13/2020 EBV DNA Not Detected EBVNEG^EBV DNA Not Detected [Copies]/mL Final   06/15/2020 EBV DNA Not Detected EBVNEG^EBV DNA Not Detected [Copies]/mL Final       BK viral loads   Recent Labs   Lab Test 08/19/20  1801   BKSPEC Plasma   BKRES BK Virus DNA Not Detected         Pathology   RUL FNA 9/17/2020  Lung, right upper lobe lesion, endobronchial ultrasound guided fine needle    aspiration:   - Negative for malignancy   TBBx 9/8/2020  FINAL DIAGNOSIS:   A. No specimen received.   B. LUNG, RIGHT UPPER LOBE, ENDOBRONCHIAL BIOPSIES:   - Two small fragments of respiratory mucosa with chronic inflammation,   frequent eosinophils, submucosal edema   and focal squamous metaplasia.   - Negative for granulomatous inflammation or malignancy in this biopsy,       Imaging:  CT chest 9/13/2020  IMPRESSION:  1. Increased size of the right hilar mass/consolidation with  associated narrowing of the subsegmental right upper lobe bronchioles  and  multiple scattered pulmonary nodules as above. Differential  continues to include infection versus malignancy with postobstructive  pneumonia.  2. Increased bibasilar groundglass and right lung tree-in-bud nodular  opacities suggestive of infection, possibly secondary to aspiration.  Small right pleural effusion.  3. Surgical changes of cardiac transplantation.  4. Cholelithiasis without cholecystitis.  CT chest 9/2/2020   IMPRESSION:   1. New mass vs consolidation in the right upper lobe and right hilum  since 7/29/2020 with narrowing of multiple right upper lobe bronchi.   1a. Differential pneumonia with reactive right hilar lymphadenopathy  vs. malignancy(ex. Lymphoma/PTLD) with post obstructive pneumonia.  2. Stable additional pulmonary lesions as above.  3. Stable postsurgical changes of heart transplant.  4. Cholelithiasis without evidence of cholecystitis.        Tavares Rodriguez MD  Pager: [phone]

## 2020-09-21 NOTE — PLAN OF CARE
D: Admitted 8/31 with syncope, SOB, cough, and fever. Found to have mass on RUL.   Hx: HTN, CAD, CKD3, hx of LV thrombus, PAF, DM2, ICM s/p LVAD HM3 with subsequent heart tx 5/18/20.     I: Monitored vitals and assessed pt status.   Changed: TF off tonight per order  Running: PIV SL'd  PRN: none     A: A0x4. VSS , on RA. RADER. Monitor -120 with r/PVC . Denies c/o pain. Last BM 9/20. Good UO. Appeared to sleep between cares.     I/O this shift:  In: -   Out: 600 [Urine:600]    Temp:  [97.6  F (36.4  C)-98.1  F (36.7  C)] 97.6  F (36.4  C)  Pulse:  [103-112] 104  Resp:  [16-20] 20  BP: (123-151)/() 124/97  SpO2:  [94 %-99 %] 98 %      P: Continue to monitor Pt status and report changes to treatment team.

## 2020-09-22 ENCOUNTER — APPOINTMENT (OUTPATIENT)
Dept: OCCUPATIONAL THERAPY | Facility: CLINIC | Age: 60
End: 2020-09-22
Payer: COMMERCIAL

## 2020-09-22 ENCOUNTER — APPOINTMENT (OUTPATIENT)
Dept: PHYSICAL THERAPY | Facility: CLINIC | Age: 60
End: 2020-09-22
Payer: COMMERCIAL

## 2020-09-22 LAB
ALBUMIN SERPL-MCNC: 2.3 G/DL (ref 3.4–5)
ALP SERPL-CCNC: 84 U/L (ref 40–150)
ALT SERPL W P-5'-P-CCNC: 69 U/L (ref 0–70)
ANION GAP SERPL CALCULATED.3IONS-SCNC: 8 MMOL/L (ref 3–14)
AST SERPL W P-5'-P-CCNC: 38 U/L (ref 0–45)
BACTERIA SPEC CULT: ABNORMAL
BILIRUB DIRECT SERPL-MCNC: 0.2 MG/DL (ref 0–0.2)
BILIRUB SERPL-MCNC: 0.5 MG/DL (ref 0.2–1.3)
BUN SERPL-MCNC: 33 MG/DL (ref 7–30)
CALCIUM SERPL-MCNC: 8.5 MG/DL (ref 8.5–10.1)
CHLORIDE SERPL-SCNC: 108 MMOL/L (ref 94–109)
CO2 SERPL-SCNC: 20 MMOL/L (ref 20–32)
CREAT SERPL-MCNC: 1.76 MG/DL (ref 0.66–1.25)
GFR SERPL CREATININE-BSD FRML MDRD: 42 ML/MIN/{1.73_M2}
GLUCOSE BLDC GLUCOMTR-MCNC: 143 MG/DL (ref 70–99)
GLUCOSE BLDC GLUCOMTR-MCNC: 146 MG/DL (ref 70–99)
GLUCOSE BLDC GLUCOMTR-MCNC: 153 MG/DL (ref 70–99)
GLUCOSE BLDC GLUCOMTR-MCNC: 155 MG/DL (ref 70–99)
GLUCOSE BLDC GLUCOMTR-MCNC: 206 MG/DL (ref 70–99)
GLUCOSE BLDC GLUCOMTR-MCNC: 325 MG/DL (ref 70–99)
GLUCOSE SERPL-MCNC: 160 MG/DL (ref 70–99)
LACTATE BLD-SCNC: 1.7 MMOL/L (ref 0.7–2)
MAGNESIUM SERPL-MCNC: 1.8 MG/DL (ref 1.6–2.3)
PHOSPHATE SERPL-MCNC: 3.3 MG/DL (ref 2.5–4.5)
POTASSIUM SERPL-SCNC: 4.9 MMOL/L (ref 3.4–5.3)
PROT SERPL-MCNC: 6.1 G/DL (ref 6.8–8.8)
SODIUM SERPL-SCNC: 135 MMOL/L (ref 133–144)
SPECIMEN SOURCE: ABNORMAL
TACROLIMUS BLD-MCNC: 4.6 UG/L (ref 5–15)
TME LAST DOSE: ABNORMAL H

## 2020-09-22 PROCEDURE — 25000132 ZZH RX MED GY IP 250 OP 250 PS 637: Performed by: NURSE PRACTITIONER

## 2020-09-22 PROCEDURE — 80197 ASSAY OF TACROLIMUS: CPT | Performed by: NURSE PRACTITIONER

## 2020-09-22 PROCEDURE — 25000132 ZZH RX MED GY IP 250 OP 250 PS 637: Performed by: STUDENT IN AN ORGANIZED HEALTH CARE EDUCATION/TRAINING PROGRAM

## 2020-09-22 PROCEDURE — 83735 ASSAY OF MAGNESIUM: CPT | Performed by: PHYSICIAN ASSISTANT

## 2020-09-22 PROCEDURE — 97110 THERAPEUTIC EXERCISES: CPT | Mod: GP

## 2020-09-22 PROCEDURE — 84100 ASSAY OF PHOSPHORUS: CPT | Performed by: PHYSICIAN ASSISTANT

## 2020-09-22 PROCEDURE — 80076 HEPATIC FUNCTION PANEL: CPT | Performed by: PHYSICIAN ASSISTANT

## 2020-09-22 PROCEDURE — 97535 SELF CARE MNGMENT TRAINING: CPT | Mod: GO

## 2020-09-22 PROCEDURE — 83605 ASSAY OF LACTIC ACID: CPT | Performed by: INTERNAL MEDICINE

## 2020-09-22 PROCEDURE — 21400000 ZZH R&B CCU UMMC

## 2020-09-22 PROCEDURE — 25000128 H RX IP 250 OP 636: Performed by: NURSE PRACTITIONER

## 2020-09-22 PROCEDURE — 36415 COLL VENOUS BLD VENIPUNCTURE: CPT | Performed by: NURSE PRACTITIONER

## 2020-09-22 PROCEDURE — 80048 BASIC METABOLIC PNL TOTAL CA: CPT | Performed by: PHYSICIAN ASSISTANT

## 2020-09-22 PROCEDURE — 25000131 ZZH RX MED GY IP 250 OP 636 PS 637: Performed by: NURSE PRACTITIONER

## 2020-09-22 PROCEDURE — 36415 COLL VENOUS BLD VENIPUNCTURE: CPT | Performed by: PHYSICIAN ASSISTANT

## 2020-09-22 PROCEDURE — 36415 COLL VENOUS BLD VENIPUNCTURE: CPT | Performed by: INTERNAL MEDICINE

## 2020-09-22 PROCEDURE — 97116 GAIT TRAINING THERAPY: CPT | Mod: GP

## 2020-09-22 PROCEDURE — 00000146 ZZHCL STATISTIC GLUCOSE BY METER IP

## 2020-09-22 PROCEDURE — 25000128 H RX IP 250 OP 636: Performed by: INTERNAL MEDICINE

## 2020-09-22 PROCEDURE — 25000132 ZZH RX MED GY IP 250 OP 250 PS 637: Performed by: INTERNAL MEDICINE

## 2020-09-22 RX ORDER — CIPROFLOXACIN 500 MG/1
500 TABLET, FILM COATED ORAL EVERY 12 HOURS SCHEDULED
Status: DISCONTINUED | OUTPATIENT
Start: 2020-09-23 | End: 2020-09-24 | Stop reason: HOSPADM

## 2020-09-22 RX ORDER — ACETAMINOPHEN 325 MG/1
975 TABLET ORAL 3 TIMES DAILY
Status: DISCONTINUED | OUTPATIENT
Start: 2020-09-22 | End: 2020-09-24 | Stop reason: HOSPADM

## 2020-09-22 RX ORDER — TACROLIMUS 1 MG/1
3 CAPSULE ORAL
Status: DISCONTINUED | OUTPATIENT
Start: 2020-09-22 | End: 2020-09-24 | Stop reason: HOSPADM

## 2020-09-22 RX ADMIN — MYCOPHENOLATE MOFETIL 500 MG: 500 TABLET ORAL at 07:44

## 2020-09-22 RX ADMIN — ACETAMINOPHEN 975 MG: 325 TABLET, FILM COATED ORAL at 17:34

## 2020-09-22 RX ADMIN — TACROLIMUS 2 MG: 1 CAPSULE ORAL at 07:44

## 2020-09-22 RX ADMIN — ACETAMINOPHEN 975 MG: 325 TABLET, FILM COATED ORAL at 10:09

## 2020-09-22 RX ADMIN — Medication 1 TABLET: at 07:44

## 2020-09-22 RX ADMIN — HYDRALAZINE HYDROCHLORIDE 75 MG: 50 TABLET, FILM COATED ORAL at 14:25

## 2020-09-22 RX ADMIN — VALGANCICLOVIR 450 MG: 450 TABLET, FILM COATED ORAL at 07:44

## 2020-09-22 RX ADMIN — PIPERACILLIN AND TAZOBACTAM 3.38 G: 3; .375 INJECTION, POWDER, FOR SOLUTION INTRAVENOUS at 15:09

## 2020-09-22 RX ADMIN — TACROLIMUS 3 MG: 1 CAPSULE ORAL at 17:36

## 2020-09-22 RX ADMIN — MYCOPHENOLATE MOFETIL 500 MG: 500 TABLET ORAL at 17:36

## 2020-09-22 RX ADMIN — PIPERACILLIN AND TAZOBACTAM 3.38 G: 3; .375 INJECTION, POWDER, FOR SOLUTION INTRAVENOUS at 02:03

## 2020-09-22 RX ADMIN — HYDRALAZINE HYDROCHLORIDE 75 MG: 50 TABLET, FILM COATED ORAL at 20:17

## 2020-09-22 RX ADMIN — ACETAMINOPHEN 975 MG: 325 TABLET, FILM COATED ORAL at 22:28

## 2020-09-22 RX ADMIN — ROSUVASTATIN CALCIUM 10 MG: 10 TABLET, FILM COATED ORAL at 20:17

## 2020-09-22 RX ADMIN — Medication 1 TABLET: at 17:35

## 2020-09-22 RX ADMIN — MAGNESIUM SULFATE IN WATER 2 G: 40 INJECTION, SOLUTION INTRAVENOUS at 08:31

## 2020-09-22 RX ADMIN — HYDRALAZINE HYDROCHLORIDE 75 MG: 50 TABLET, FILM COATED ORAL at 07:44

## 2020-09-22 RX ADMIN — PIPERACILLIN AND TAZOBACTAM 3.38 G: 3; .375 INJECTION, POWDER, FOR SOLUTION INTRAVENOUS at 07:45

## 2020-09-22 RX ADMIN — PIPERACILLIN AND TAZOBACTAM 3.38 G: 3; .375 INJECTION, POWDER, FOR SOLUTION INTRAVENOUS at 22:27

## 2020-09-22 RX ADMIN — MULTIPLE VITAMINS W/ MINERALS TAB 1 TABLET: TAB at 07:44

## 2020-09-22 ASSESSMENT — ACTIVITIES OF DAILY LIVING (ADL)
ADLS_ACUITY_SCORE: 13
ADLS_ACUITY_SCORE: 15
ADLS_ACUITY_SCORE: 13
ADLS_ACUITY_SCORE: 15
ADLS_ACUITY_SCORE: 13
ADLS_ACUITY_SCORE: 13

## 2020-09-22 ASSESSMENT — MIFFLIN-ST. JEOR: SCORE: 1532.52

## 2020-09-22 ASSESSMENT — PAIN DESCRIPTION - DESCRIPTORS: DESCRIPTORS: DISCOMFORT;SORE;OTHER (COMMENT)

## 2020-09-22 NOTE — PLAN OF CARE
D: Admitted 8/31 with syncope, SOB, cough, and fever. Found to have mass on RUL.   Hx: HTN, CAD, CKD3, hx of LV thrombus, PAF, DM2, ICM s/p LVAD HM3 with subsequent heart tx 5/18/20.     I: Monitored vitals and assessed pt status.   Changed: NJT dc'd yesterday  Running: PIV SL'd  PRN: none     A: A0x4. VSS , on RA. RADER. Monitor -125 with r/PVC . Denies c/o pain. Last BM 9/22. Good UO. Appeared to sleep between cares.     I/O this shift:  In: 100 [I.V.:100]  Out: 300 [Urine:300]    Temp:  [98  F (36.7  C)-98.4  F (36.9  C)] 98.3  F (36.8  C)  Pulse:  [103-117] 103  Resp:  [16-20] 16  BP: (118-156)/() 148/107  SpO2:  [95 %-98 %] 95 %      P: Continue to monitor Pt status and report changes to treatment team.

## 2020-09-22 NOTE — PLAN OF CARE
D: Admitted 8/31 with syncope, SOB, cough, and fever. Found to have mass on RUL. History of HTN, CAD, CKD3, LV thrombus, PAF, DM2, ICM s/p LVAD HM3 s/p heart tx 5/18/20.    I/A: VSS on room air. SR/ST on monitor. Good appetite. Up ad domingo. IV abx continue, per note transition to PO soon.    P: Video swallow and esophagram tomorrow at 1pm.

## 2020-09-22 NOTE — PLAN OF CARE
OT 6C  Discharge Planner OT   Patient plan for discharge: Home  Current status: SBA for STS from chair and shower chair. SBA for in room ambulation. SBA for transfer into walk-in shower. Pt showered with SBA, shower chair, and grab bars when standing in shower. SBA to stand at sink and shave.   Barriers to return to prior living situation: acute medical needs, decreased activity tolerance, decreased exercise endurance  Recommendations for discharge: Home with assist and resumption of OP CR  Rationale for recommendations: Pt would benefit from continued skilled therapy to increase activity tolerance and IND with ADLs.       Entered by: Madelyn Miranda 09/22/2020 10:23 AM

## 2020-09-22 NOTE — PROGRESS NOTES
Calorie Count  Intake recorded for: 9/21  Total Kcals: 633 Total Protein: 20g  Kcals from Hospital Food: 633  Protein: 20g  Kcals from Outside Food (average):0 Protein: 0g  # Meals Recorded: 100% frosted mini wheats, 2 whole milks, banana bread w/ butter   # Supplements Recorded: 0

## 2020-09-22 NOTE — PLAN OF CARE
6C Discharge Planner PT   Patient plan for discharge: home   Current status: VSS on RA. IND bed mob and transfers. SBA then progressed to IND with ambulation and stairs. Increased activity tolerance - no rest breaks needed. Pt with no concerns with discharge home. Encouraged pt to ambulate in hallways with RN 3-4x/day. Recs up IND.    Barriers to return to prior living situation: medical status  Recommendations for discharge: home   Rationale for recommendations: pt is safe to return home.          Entered by: Lin Lisa 09/22/2020 11:56 AM       Physical Therapy Discharge Summary    Reason for therapy discharge:    Discharged to home.    Progress towards therapy goal(s). See goals on Care Plan in [facility] electronic health record for goal details.  Goals met    Therapy recommendation(s):    No further therapy is recommended.

## 2020-09-22 NOTE — PLAN OF CARE
SLP consult received. Chart reviewed and case discussed with RN and NP. RN reports no observable oropharyngeal swallowing deficits and reportedly the patient also denies difficulty. NP consulted SLP due to concern for aspiration and possible GERD on imaging. Per discussion with NP, will hold on bedside evaluation, will proceed with a videoswallow study and esophagram on 9/23/20 to more objectively assess swallow function and rule out silent aspiration.

## 2020-09-22 NOTE — PROGRESS NOTES
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    Today's Plan:  -XR video study and esophagram  -increase tacrolimus to 2/3 mg, daily levels   -schedule APAP for pleuritic chest pain    -change to oral abx in the coming days through 10/11  -chest CT on 10/3, follow-up with Dr Rodriguez as scheduled    #Post-obstructive versus aspiration pneumonia  #Cryptogenic RUL mass vs consolidation in the RUL and right hilum with narrowing of multiple RUL bronchi (since 7/29)  #Tissue cx +abiotrophia defectiva  Diagnosed with rhinovirus 8/2020. P/w fever and cough.   Completed Work-up:  - 8/31: Fungitell assay was low-grade positive/stable, and corresponding galactomannan antigen assay was negative.  COVID negative.  - 9/1:  Strep pneumo, CDiff, cryptococcal antigen, toxoplasma, legionella negative.  CMV and EBV negative.  - 9/2:  PJP PCR of sputum negative.  CT Chest showing new RUL mass concerning for PTLD vs infection, new since last chest CT 7/29.  - 9/4:  Aspergillus, coccidiodes, histoplasma, and blastomycis negative.  - 9/7:  BCs negative.  - 9/8:  Bronchial lavage of right lung negative for malignancy but GMS stain positive for rare pseudohyphae. Nocardia negative. PJP negative. FNA negative. FNA biopsy negative for malignancy.  - 9/8: Bronchial lavage cultures no growth, fungal DNA 28S negative  - 9/9:  CDiff negative, BCs negative.  - 9/12:  Absolute CD3 618, CD4 359, CD8 114, CD3 " Mature T 71, CD4:CD8 ratio 3.15, CD4 [place] T 41, CD8 Suppressor T 13.  - 9/13: EBV negative. IgA 108, IgG 510, IgM 29.  Repeat chest CT showed increase in size of RUL mass and post-obstructive PNA.  - 9/14: COVID negative.  - 9/15: Bronchial lavage of right lung negative for malignancy and negative for PJP and fungal orgamisms.  - 9/17: CDiff negative.  RUL culture negative to date, nocardia negative to date.  Leukemia/lymphoma eval negative.  FNA negative for malignancy.  Bronchial lavage of right lung nondiagnostic. Tissue from RUL showing abiotrophia defectiva, oral microbe, possible contaminant per Dr. Rodriguez but responding to abx (CT improved, clinically improved) so will continue to treat.  - 9/17 Actinomyces, nocardia, legionella, KOH negative  - 9/17: Right upper lung biopsy non diagnostic, scant alveolar macrophages.       Pending:  - 9/14: Viral respiratory cultures in process  - 9/15: Bronchial lavage cultures no growth     Antibiotics:  - vanc/zosyn 8/31-9/1  - Cefdnir 9/1, stopped 9/13  - Posaconazole 9/4-9/13, no beneficial effect on either fevers nor the size of the RUL mass despite therapeutic levels  - Atovaquone 9/12-9/15   - Zosyn restarted 9/13 (when cefdinir and posaconazole were stopped)     Plan:  - Zosyn -> 9/22/20 to complete a ten day course for a presumptive post-obstructive (or aspiration) right lung pneumonia  - plan is to switch to PO cipro 500 mg bid and clindamycin 450 mg tid when clinically appropriate, likely tomorrow, through 10/11   - XR video swallow and esophagram today given concern for aspiration PNA and hiatal hernia  - per transplant ID, no need to pursue further bx as his CT chest is improving   - if further biopsy indicated, may need VATS  - repeat chest CT 10/3 and f/u with Dr Rodriguez on 10/6     #s/p orthotopic heart transplant 5/18/20 for NICM  Post-operative course c/b NSVT, leukocytosis with C Acnes growing from his former LVAD site (thought to be a contaminant, treated  with IV vanco --> po doxy), and in the setting of donor blood growing S anginosus and Veillonella (also thought to be a contaminant, but treated with Vanco/zosyn --> Vanco/Flagyl for a total of 7 days).      SELVIN 8/31 LVEF >70% and normal RV size/function  RHC 8/26 RA 3, mPA 15, PCW 10, and CI 2.63.     Graft function:  - BP: 119-148/90-100s, monitor this PM may need to increase hydralazine  - HR: 100s  - Fluid status: euvolemic to hypovolemic, encourage PO     Immunosuppression:  - MMF 500mg bid (decreased 9/8 due to low ImmuKnow)  - tacro goal level 8-10, today 4.6, increase to 2 mg in AM 3 mg in PM    Serostatus: CMV D+/R- EBV D-/R+ Toxo D-/R-     PPx:  - CAV: aspirin held due to hematuria on UA and anemia, continue crestor 10mg daily  - CMV: valcyte 450mg EOD (renally dosed), x6 months (through 11/2020)  - Osteoporosis:  calcium/vitamin D   - GI: pantoprazole 40mg daily     Health maintenance:  - month 4 surveillance due 9/30  - needs baseline cor angio when renal function stable  - needs flu shot ?prior to discharge      #Pleuritic chest pain   Worse with coughing. Likely MSK due to frequent coughing with some pleurisy. Relieved with APAP.  -scheduled APAP 975 mg q8hr, patient declining opioid like Rx or topicals at this time    #ANDREW on CKD due to hypovolemia and tacrolimus  #Hyponatremia, hypovolemic  PTA Cr raned 1.3-1.7. UA protein 30, RBC 53. Cr peaked at 3.88, since trended down.  Multiple renal ultrasounds negative for obstructive stones and hydronephrosis. Urine sodium 52 and FeNA 2.5%, suggesting intrinsic renal process. Per renal, may have developed some ATN due to a combination of mild volume depletion and tacrolimus.    - renal consult  - daily BMP and tacro levels  - avoid hypovolemia, encouraging PO intake     #Mild transaminitis, resolved  Likely drug induced. ALT 79  at peak. Improving daily, now normal.  - monitor intermittently     OTHER:  #NSVT, resolved:  Postoperative, no hemodynamic  "compromise.  #Elevated PSA with suspected chronic prostatitis:  Prostate MRI showed signs of BPH.  PSA was 10.8 8/2020, 8.8 9/2020.  Sntibiotic course as above.  #DMII:  Continue insulin management per endocrine.  Insulin gtt stopped 9/19. Stopped TFs 9/20, NJT pulled 9/21.   #Diarrhea, intermittent, improving:  CDiff and CMV negative on admission, and enteric panel negative 9/9, CDiff again negative 9/17. Diarrhea attributed to TFs. PRN imodium.  #HTN:  PTA amlodipine held, see above.     FEN: regular diet  PROPHY:  ambulatory  LINES:  PIV  DISPO:  Pending, discharge to home in~ 2 days  CODE STATUS:  Full code    Dinora Harper, LEX, NP-C  Advanced Heart Failure/Cardiology II Service  Pager [phone] [facility] [id number]    ================================================================    Subjective/24-Hr Events:   Last 24 hr care team notes reviewed. No overnight events. Patient reports feeling ok today. Only complaint is chest pain with coughing in anterior chest, diffuse, non tender. Cough improved. No fevers overnight. Still some shortness of breath with physical therapies. Denies orthopnea, PND, LE edema.     ROS:  4 point ROS including respiratory, CV, GI and  (other than that noted in the HPI) is negative.     Medications: Reviewed in EPIC.     Physical Exam:   BP (!) 145/108 (BP Location: Left arm, Cuff Size: Adult Regular)   Pulse 103   Temp 97.6  F (36.4  C) (Oral)   Resp 16   Ht 1.626 m (5' 4\")   Wt 79.7 kg (175 lb 9.6 oz)   SpO2 97%   BMI 30.14 kg/m      GENERAL: Appears comfortable, in no distress.  HEENT: Eye symmetrical, no discharge or icterus bilaterally. Mucous membranes moist and without lesions.  NECK: Supple, JVD not visible.   CV: Tachycardic and regular, +S1S2, no murmur, rub, or gallop.   RESPIRATORY: Respirations regular, even, and unlabored. Lungs CTA throughout.    GI: Soft, obese and non distended with normoactive bowel sounds present in all quadrants. No tenderness, rebound, guarding. "   EXTREMITIES: No peripheral edema. 2+ bilateral pedal pulses.   NEUROLOGIC: Alert and oriented x 3. No focal deficits.   MUSCULOSKELETAL: No joint swelling or tenderness.   SKIN: No jaundice. No rashes or lesions.     Labs:  CMP  Recent Labs   Lab 09/22/20  0530 09/21/20  0527 09/20/20  0605 09/19/20  0513    137 135 135   POTASSIUM 4.9 5.1 5.1 5.1   CHLORIDE 108 110* 108 108   CO2 20 20 20 21   ANIONGAP 8 7 7 7   * 160* 336* 171*   BUN 33* 37* 46* 57*   CR 1.76* 1.86* 2.29* 2.94*   GFRESTIMATED 42* 39* 30* 22*   GFRESTBLACK 48* 45* 35* 26*   ARLENE 8.5 8.5 8.1* 8.4*   MAG 1.8 1.6 1.6 1.9   PHOS 3.3 3.6 2.2* 2.9   PROTTOTAL 6.1* 6.0* 5.9* 5.6*   ALBUMIN 2.3* 2.1* 2.0* 1.9*   BILITOTAL 0.5 0.4 0.3 0.3   ALKPHOS 84 84 100 95   AST 38 52* 105* 87*   ALT 69 82* 109* 74*       CBC  Recent Labs   Lab 09/21/20  0527 09/20/20  0605 09/19/20  0513 09/18/20  0457   WBC 3.3* 3.8* 5.1 4.0   RBC 2.94* 2.99* 2.90* 2.85*   HGB 8.1* 8.2* 7.8* 7.7*   HCT 26.7* 27.7* 26.5* 25.6*   MCV 91 93 91 90   MCH 27.6 27.4 26.9 27.0   MCHC 30.3* 29.6* 29.4* 30.1*   RDW 14.6 14.5 14.1 13.6    380 411 348       Time/Communication  I personally spent a total of 25 minutes. Of that 15 minutes was counseling/coordination of patient's care. Plan of care discussed with patient. See my note above for details.    Patient discussed with Dr. Stone.

## 2020-09-22 NOTE — PROGRESS NOTES
"IP Diabetes Management  Daily Note           Assessment and Plan:   HPI: Mariusz Sharp \"Red\" is a 59 year old male with history of CAD, LV thrombus, CKD Stage III, PAF, DM Type II, and ICM s/p HM III LVAD as BTT (2018) with subsequent OHT 5/18/20, presenting with progressive SOB, cough, and fever, found to have right hilar lung mass with infiltrate. S/P bronch with biopsy 9/15. Poor PO intake, requiring temporary initiation of enteral feeds, now discontinued.     Assessment:   1)Type II Diabetes Mellitus,  2) Enteral feed-induced hyperglycemia - resolved, now off TF    Plan:    -increase Lantus 18>20 units HS   -aspart 1:6g CHO coverage with meals and snacks/supplements (increased with lunch yesterday)-monitor for further increase today.   -aspart high intensity sliding scale TID AC and HS   -BG monitoring TID AC, HS, 0200   -hypoglycemia protocol   -diet with carb counting protocol   -diabetes education needs will be reassessed closer to discharge; none anticipated.    Outpatient follow up: with [facility] within 2-3 weeks following discharge.   Plan discussed with patient.    Interval History and Assessment: interval glucose trend reviewed:   Hyperglycemia yesterday related to late breakfast and inappropriate late timing of Novolog insulin; next BG check was also postprandial. Mealtime insulin was increased beginning with lunch, due to the significant postprandial spike, and BG improved by evening.     Fasting glucose today 155; as PO intake improves will need to increase Lantus.    Kcal intake yesterday 633 cals, no between meal supplements recorded. Red reports that he knows to receive carb coverage with supplements, and has been doing this with intermittent supplement intake.    CT with evidence of hiatal hernia. Concern for silent aspiration: SLP consulted and will have swallow study 9/23.    Lung Bx results from 9/17: no evidence for malignancy, micro culture with Abiotrophia. AFB, KOH, fungus " cultures negative to date.     Current nutritional intake and type: Orders Placed This Encounter      Regular Diet Adult      PTA Diabetes Regimen:   Dexcom G6 CGM  Lantus 32 units daily HS  Novolog 1:8g CHO and high intensity sliding scale AC/HS.    Discharge Planning: to home, pending workup for lung mass, and medical stability.           Diabetes History:   Type of Diabetes: Type 2 Diabetes Mellitus  Lab Results   Component Value Date    A1C 7.4 08/19/2020    A1C 7.2 08/13/2020    A1C 7.8 06/15/2020    A1C 8.0 05/12/2020    A1C 9.5 07/05/2019              Review of Systems:   The Review of Systems is negative other than noted in the Interval History.           Medications:     Current Facility-Administered Medications   Medication     acetaminophen (TYLENOL) tablet 975 mg     albuterol (PROVENTIL) neb solution 2.5 mg     benzocaine-menthol (CEPACOL) 15-3.6 MG lozenge 1 lozenge     calcium carbonate 600 mg-vitamin D 400 units (CALTRATE) per tablet 1 tablet     glucose gel 15-30 g    Or     dextrose 50 % injection 25-50 mL    Or     glucagon injection 1 mg     hydrALAZINE (APRESOLINE) injection 10 mg     hydrALAZINE (APRESOLINE) tablet 75 mg     insulin aspart (NovoLOG) injection (RAPID ACTING)     insulin aspart (NovoLOG) injection (RAPID ACTING)     insulin aspart (NovoLOG) injection (RAPID ACTING)     insulin aspart (NovoLOG) injection (RAPID ACTING)     insulin glargine (LANTUS PEN) injection 20 Units     Lidocaine (LIDOCARE) 4 % Patch 1 patch    And     lidocaine patch in PLACE     lidocaine (LMX4) cream     lidocaine 1 % 0.1-1 mL     loperamide (IMODIUM) capsule 2 mg     magnesium sulfate 2 g in water intermittent infusion     magnesium sulfate 4 g in 100 mL sterile water (premade)     multivitamin w/minerals (THERA-VIT-M) tablet 1 tablet     mycophenolate (GENERIC EQUIVALENT) tablet 500 mg     naloxone (NARCAN) injection 0.1-0.4 mg     ondansetron (ZOFRAN) tablet 4 mg     oxyCODONE (ROXICODONE) tablet 5 mg  "    Patient is already receiving mechanical prophylaxis     piperacillin-tazobactam (ZOSYN) 3.375 g vial to attach to  mL bag     potassium chloride (KLOR-CON) Packet 20-40 mEq     potassium chloride 10 mEq in 100 mL intermittent infusion with 10 mg lidocaine     potassium chloride 10 mEq in 100 mL sterile water intermittent infusion (premix)     potassium chloride 20 mEq in 50 mL intermittent infusion     potassium chloride ER (KLOR-CON M) CR tablet 20-40 mEq     prochlorperazine (COMPAZINE) injection 5 mg     rosuvastatin (CRESTOR) tablet 10 mg     sodium chloride (NEBUSAL) 3 % neb solution 3 mL     sodium chloride (PF) 0.9% PF flush 3 mL     sodium chloride (PF) 0.9% PF flush 3 mL     sodium phosphate 10 mmol in D5W intermittent infusion     sodium phosphate 15 mmol in D5W intermittent infusion     sodium phosphate 20 mmol in D5W intermittent infusion     sodium phosphate 25 mmol in D5W intermittent infusion     tacrolimus (GENERIC EQUIVALENT) capsule 2 mg     tacrolimus (GENERIC EQUIVALENT) capsule 2 mg     valGANciclovir (VALCYTE) tablet 450 mg            Physical Exam:    BP (!) 145/108 (BP Location: Left arm, Cuff Size: Adult Regular)   Pulse 103   Temp 97.6  F (36.4  C) (Oral)   Resp 16   Ht 1.626 m (5' 4\")   Wt 79.7 kg (175 lb 9.6 oz)   SpO2 97%   BMI 30.14 kg/m    General: pleasant, in no distress, resting in bed.    HEENT: normocephalic, atraumatic. Oral mucous membranes moist.   Lungs: unlabored respiration, no cough  ABD: rounded, soft, no lipodystrophy noted  Skin: warm and dry, no obvious lesions  MSK:  moves all extremities  Lymp:  no LE edema   Mental status:  alert, oriented to self, place, time  Psych:  affect, calm and appropriate interaction             Data:     Recent Labs   Lab 09/22/20  0831 09/22/20  0722 09/22/20  0530 09/22/20  0209 09/21/20  2201 09/21/20  1737 09/21/20  1504  09/21/20  0527  09/20/20  0605  09/19/20  0513  09/18/20  1844  09/18/20  0457   GLC  --   --  160* "  --   --   --   --   --  160*  --  336*  --  171*  --  139*  --  171*   * 155*  --  143* 158* 139* 311*   < >  --    < >  --    < >  --    < >  --    < >  --     < > = values in this interval not displayed.     Lab Results   Component Value Date    WBC 3.3 (L) 09/21/2020    WBC 3.8 (L) 09/20/2020    WBC 5.1 09/19/2020    HGB 8.1 (L) 09/21/2020    HGB 8.2 (L) 09/20/2020    HGB 7.8 (L) 09/19/2020    HCT 26.7 (L) 09/21/2020    HCT 27.7 (L) 09/20/2020    HCT 26.5 (L) 09/19/2020    MCV 91 09/21/2020    MCV 93 09/20/2020    MCV 91 09/19/2020     09/21/2020     09/20/2020     09/19/2020     Lab Results   Component Value Date     09/22/2020     09/21/2020     09/20/2020    POTASSIUM 4.9 09/22/2020    POTASSIUM 5.1 09/21/2020    POTASSIUM 5.1 09/20/2020    CHLORIDE 108 09/22/2020    CHLORIDE 110 (H) 09/21/2020    CHLORIDE 108 09/20/2020    CO2 20 09/22/2020    CO2 20 09/21/2020    CO2 20 09/20/2020     (H) 09/22/2020     (H) 09/21/2020     (H) 09/20/2020     Lab Results   Component Value Date    BUN 33 (H) 09/22/2020    BUN 37 (H) 09/21/2020    BUN 46 (H) 09/20/2020     Lab Results   Component Value Date    TSH 3.94 06/26/2019    TSH 6.91 (H) 07/23/2018     Lab Results   Component Value Date    AST 52 (H) 09/21/2020     (H) 09/20/2020    AST 87 (H) 09/19/2020    ALT 82 (H) 09/21/2020     (H) 09/20/2020    ALT 74 (H) 09/19/2020    ALKPHOS 84 09/21/2020    ALKPHOS 100 09/20/2020    ALKPHOS 95 09/19/2020       Diabetes Management Team job code: [id number]  I spent a total of 35 minutes bedside and on the inpatient unit managing glycemic care. Over 50% of my time on the unit was spent counseling the patient and/or coordinating care regarding acute hyperglycemia management.  See note for details.    Juana Ly PA-C  Inpatient Diabetes Management Service  Pager [phone]

## 2020-09-22 NOTE — PROGRESS NOTES
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 May switch to PO cipro 500 mg PO bid and clindamycin 450 mg tid, whenever you feel comfortable.   - duration is total of 4 weeks since zosyn was initiated (till 10/11/2020).   2. No need to resume cefdinir while on augmentin.   3. Please arrange for CT chest without contrast around 10/3/2020.   - if it's going to be done in an outside institution, we need to arrange to have it pushed to our system so I can review the CT before his next appointment with me.   4. Follow up with me in a virtual visit on 10/6/2020 @4:30 PM (please include this in the patient's discharge instruction).   5. Consider evaluation for aspiration and evaluation for GERD given hiatal hernia and dilated end of esophagus per CT.         Summary of Presentation:   Transplants:  5/18/2020 (Heart), Postoperative day:  127     This patient is a 59 year old male with ICMP s/p OHT basiliximab for induction, TAC/MMF for maintenance IS.   Presented with one day of cough, fever, shortness of breath and post tussive LOC.         Active Problems and Infectious Diseases Issues:   1. Likely aspiration pneumonia  The patient responded to IV zosyn with resolution of fever and improvement of chest CT.   I am wondering if the patient has aspiration pneumonia, the CT showed dilated distal end of esophagus and hiatal hernia which may be resulting in aspiration and aspiration pneumonia.     The BALs on 9/8/2020, 9/15/2020 and 9/17/2020 were all unremarkable except for the growth of Abiotrophia defectiva (oral simona).   The TBBx were all non-diagnostic/unremarkable.     Given the improvement of the clinical and radiological findings I feel we can switch the patient  to PO cipro for Gram negative coverage and clindamycin to cover for the Abiotrophia defectiva and other Gram positive.   Will treat for total of 4 weeks given the aggressive clinical and radiological findings. Will have the patient have a follow up CT to assess response to therapy.     2. The patient was being treated for chronic prostatitis with cefdinir.   While on cipro, there would be no need resume cefdinir.         Old Problems and Infectious Diseases Issues:   1. 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   2. C acnes in the extracted VAD; treated with vancomycin then doxycycline.     Other Infectious Disease issues include:  - QTc 475 as of 9/16/2020.   - PCP prophylaxis: bactrim caused hyperkalemia, then pentamidine. Now off PJP ppx.   - Serostatus: CMV D+/R-, EBV D-/R+, HSV1?/2?, VZV +, Toxo D-/R-     On VGCV ppx per protocol.   - Gamma globulin status: 510 as of 9/13/2020       Attestation:  I interviewed the patient and obtained history from the patient, and by reviewing the patient's chart including outside records, microbiological data, and radiological data. All data are summarized in this notes.  Tavares Rodriguez MD   Pager: [phone]  09/22/2020      Interim History and Events:   The cough is stable since yesterday but overall improved since admission.   No fever, no LOC.   With loose stool, but no N/V and no abdominal pain.   Still feels deconditioned and easily short of breath.     HPI:  In summary:  Presented with 24 hr of dyspnea, fever, and cough with post tussive LOC.   He was found to have RUL consolidation vs obstructive LN vs mass.   Non-invasive workup with urine and blood Histo and Blasto Ag were negative, A galactomannan negative, BD glucan low positive at 86 and 90.  He was treated with posaconazole empirically from 9/4/2020 till 9/13/202 when he spiked fever and CT showed worsening of the mass/consolidation. Zosyn was initiated on  9/13/2020 for possible post obstructive pneumonia with resolution of fever and posaconazole was discontinued due to lack of effectiveness.   Bronchoscopy with TBBx on 9/8/2020 and 9/15/2020 were both not diagnostic. FNA on 9/17/202 and BAL the same day were also not significant.   The biopsy from 9/8/2020 was also sent for NGS by PCR of 28S DNA and was negative for fungal infection.   EBV was not detected.       ROS:  As mentioned in the interim history otherwise negative by reviewing constitutional symptoms, central and peripheral neurological systems, respiratory system, cardiac system, GI system,  system, musculoskeletal, skin, allergy, and lymphatics.                 Pysical Examination:  Temp: 97.6  F (36.4  C) Temp src: Oral BP: (!) 145/108 Pulse: 103   Resp: 16 SpO2: 97 % O2 Device: None (Room air)    Vitals:    09/18/20 0644 09/19/20 0111 09/20/20 0140 09/21/20 0537   Weight: 84.4 kg (186 lb) 85.5 kg (188 lb 6.4 oz) 83.9 kg (184 lb 14.4 oz) 81 kg (178 lb 8 oz)    09/22/20 0625   Weight: 79.7 kg (175 lb 9.6 oz)     Constitutional: awake, alert, cooperative, no apparent distress and appears at stated age, well nourished.   Head, ENT, Eyes, and Neck: Normocephalic, sinuses non-tender to palpation, external ears without lesions, moist buccal mucosa without oral thrush or ulcers, tonsils without swelling, erythema, or exudate, no tenderness palpating teeth, good dentition, gums without necrosis or abscesses.   PERRL, EOMI, pink conjunctivae, non-icteric sclera.   Neck supple without rigidity, no cervical/axillary/inguinal LA bilaterally.    Neurologic: Patient is moving all extremities without focal deficit, no focal sensory loss.   Lungs: CTA bilaterally, no accessory muscle use, no dullness to percussion and no abnormal tactile fremitus.   CVS: RRR, normal S1/S2, no murmur, PMI was not displaced.   Abdomen: non-tender, non-distended, no masses, no bruit, no shifting dullness, normal BS.   Extremities: +4  pitting edema of bilateral lower extremities, no ulcers, normal ROM of all joints, no swelling or erythema of any of joints and no tenderness to palpation.   Skin: no induration, fluctuation or discharge, and no rash       Medications:  Medications that Require Transfusion:     - MEDICATION INSTRUCTIONS -       Scheduled Medications:     calcium carbonate 600 mg-vitamin D 400 units  1 tablet Oral BID w/meals     hydrALAZINE  75 mg Oral TID     insulin aspart   Subcutaneous TID w/meals     insulin aspart  1-10 Units Subcutaneous TID AC     insulin aspart  1-7 Units Subcutaneous At Bedtime     insulin glargine  20 Units Subcutaneous At Bedtime     lidocaine  1 patch Transdermal Q24h    And     lidocaine   Transdermal Q8H     multivitamin w/minerals  1 tablet Oral Daily     mycophenolate  500 mg Oral BID IS     piperacillin-tazobactam  3.375 g Intravenous Q6H     rosuvastatin  10 mg Oral At Bedtime     sodium chloride (PF)  3 mL Intracatheter Q8H     tacrolimus  2 mg Oral QPM     tacrolimus  2 mg Oral QAM     valGANciclovir  450 mg Oral Daily         Laboratory Data:   Absolute CD4   Date Value Ref Range Status   09/12/2020 359 (L) 441 - 2,156 cells/uL Final       Inflammatory Markers    Recent Labs   Lab Test 07/23/18  0645   CRP 11.0*       Immune Globulin Studies     Recent Labs   Lab Test 09/13/20  0612   *   IGM 29*   *          Metabolic Studies       Recent Labs   Lab Test 09/22/20  0530 09/21/20  0527 09/20/20  0605 09/19/20  0513 09/18/20  1844 09/18/20  0457  09/13/20  0804  09/01/20  0658  08/19/20  0558  06/15/20  0826    137 135 135 136 134   < >  --    < > 134   < > 139   < > 139   POTASSIUM 4.9 5.1 5.1 5.1 5.1 5.2   < >  --    < > 3.8   < > 3.6   < > 4.8   CHLORIDE 108 110* 108 108 108 107   < >  --    < > 104   < > 107   < > 110*   CO2 20 20 20 21 21 20   < >  --    < > 22   < > 25   < > 21   ANIONGAP 8 7 7 7 7 7   < >  --    < > 9   < > 6   < > 8   BUN 33* 37* 46* 57* 60* 66*    < >  --    < > 21   < > 27   < > 98*   CR 1.76* 1.86* 2.29* 2.94* 3.45* 3.50*   < >  --    < > 1.38*   < > 1.31*   < > 1.89*   GFRESTIMATED 42* 39* 30* 22* 18* 18*   < >  --    < > 56*   < > 60*   < > 38*   * 160* 336* 171* 139* 171*   < >  --    < > 118*   < > 69*   < > 120*   A1C  --   --   --   --   --   --   --   --   --   --   --  7.4*   < > 7.8*   ARLENE 8.5 8.5 8.1* 8.4* 8.7 8.4*   < >  --    < > 8.5   < > 8.4*   < > 9.0   PHOS 3.3 3.6 2.2* 2.9  --  1.5*   < >  --   --   --    < >  --    < > 4.4   MAG 1.8 1.6 1.6 1.9  --  1.9   < >  --    < > 1.4*   < >  --    < > 1.7   LACT  --   --   --   --   --   --   --  0.7  --  0.9   < >  --    < >  --    CKT  --   --   --   --   --   --   --   --   --   --   --   --   --  132    < > = values in this interval not displayed.       Hepatic Studies    Recent Labs   Lab Test 09/21/20  0527 09/20/20  0605 09/19/20  0513 09/18/20  0457 09/17/20  0440 09/16/20  0541 09/12/20  1204  09/01/20  0658   BILITOTAL 0.4 0.3 0.3 0.4 0.4 0.4  --    < > 0.9   ALKPHOS 84 100 95 108 114 116  --    < > 62   ALBUMIN 2.1* 2.0* 1.9* 1.8* 1.5* 1.7*  --    < > 2.4*   AST 52* 105* 87* 69* 87* 125*  --    < > 16   ALT 82* 109* 74* 66 73* 79*  --    < > 11   LDH  --   --   --   --   --   --  380*  --  228*    < > = values in this interval not displayed.       Pancreatitis testing    Recent Labs   Lab Test 09/01/20  0658 06/15/20  0826 05/18/20  0857 05/18/20  0010 08/06/18  1025   AMYLASE  --   --  56 65  --    TRIG 143 73  --   --  246*       Hematology Studies      Recent Labs   Lab Test 09/21/20  0527 09/20/20  0605 09/19/20  0513 09/18/20  0457 09/17/20  0440 09/16/20  0541  09/05/20  0549 09/04/20  0543 09/03/20  0526 09/02/20  0551 09/01/20  0658   WBC 3.3* 3.8* 5.1 4.0 3.3* 3.4*   < > 5.3 3.8* 3.1* 2.8* 3.4*   ANEU 2.2  --   --   --   --   --   --  4.1 3.1 2.4 1.8 2.6   ALYM 0.7*  --   --   --   --   --   --  0.8 0.3* 0.3* 0.7* 0.5*   MARTHA 0.2  --   --   --   --   --   --  0.3 0.2 0.2  0.2 0.3   AEOS 0.0  --   --   --   --   --   --  0.0 0.2 0.1 0.1 0.0   HGB 8.1* 8.2* 7.8* 7.7* 7.9* 8.5*   < > 8.3* 8.7* 8.3* 8.1* 7.9*   HCT 26.7* 27.7* 26.5* 25.6* 25.9* 26.4*   < > 26.5* 27.5* 27.0* 26.7* 26.0*    380 411 348 380 404   < > 353 376 333 354 336    < > = values in this interval not displayed.       Arterial Blood Gas Testing    Recent Labs   Lab Test 05/24/20  1137 05/21/20  0756 05/21/20  0445 05/20/20  2340  05/19/20  0943 05/19/20  0812 05/19/20  0324 05/19/20  0009  05/18/20  1835   PH  --   --   --   --   --  7.40 7.40 7.39 7.35  --  7.43   PCO2  --   --   --   --   --  27* 33* 38 39  --  33*   PO2  --   --   --   --   --  131* 124* 132* 153*  --  323*   HCO3  --   --   --   --   --  17* 21 23 21  --  22   O2PER 24.0 2L 2L 2L   < > 40 40  40 40  40 40  40   < > 100    < > = values in this interval not displayed.        Urine Studies     Recent Labs   Lab Test 09/16/20  1620 09/14/20 2020 08/31/20  1615 08/20/20  1630 08/18/20  1404   URINEPH 5.5 5.5 5.5 5.5 5.5   NITRITE Negative Negative Negative Negative Negative   LEUKEST Trace* Negative Negative Small* Moderate*   WBCU 5 4 1 12* 28*       Vancomycin Levels     Recent Labs   Lab Test 05/27/20  0837 05/24/20  1726 05/22/20  1704 05/20/20  1145   VANCOMYCIN 26.6* 11.9 12.5 15.3       Tobramycin levels     No lab results found.    Gentamicin levels    No lab results found.    Tacrolimus levels    Invalid input(s): TACROLIMUS, TAC, TACR  Transplant Immunosuppression Labs Latest Ref Rng & Units 9/22/2020 9/21/2020 9/20/2020 9/19/2020 9/18/2020   Tacro Level 5.0 - 15.0 ug/L - 4.8(L) 4.4(L) - -   Tacro Level - - Not Provided Not Provided - -   Creat 0.66 - 1.25 mg/dL 1.76(H) 1.86(H) 2.29(H) 2.94(H) 3.45(H)   BUN 7 - 30 mg/dL 33(H) 37(H) 46(H) 57(H) 60(H)   WBC 4.0 - 11.0 10e9/L - 3.3(L) 3.8(L) 5.1 -   Neutrophil % - 66.7 - - -   ANEU 1.6 - 8.3 10e9/L - 2.2 - - -       Microbiology:  BAL 9/17/2020 A defectiva  BAL 9/15/2020 negative   BAL  9/8/2020 negative.   Last check of C difficile  C Diff Toxin B PCR   Date Value Ref Range Status   09/17/2020 Negative NEG^Negative Final     Comment:     Negative: C. difficile target DNA sequences NOT detected, presumed negative   for C.difficile toxin B or the number of bacteria present may be below the   limit of detection for the test.  FDA approved assay performed using [employer] GeneXpert real-time PCR.  A negative result does not exclude actual disease due to C. difficile and may   be due to improper collection, handling and storage of the specimen or the   number of organisms in the specimen is below the detection limit of the assay.         Virology:  CMV viral loads  No results found for: 59653, 85505, 73904, 28200, CMVQAL  CMV viral loads    Recent Labs   Lab Test 09/13/20  0612 09/01/20  0658   CSPEC Plasma Plasma   CMVLOG Not Calculated Not Calculated       CMV viral loads    Log IU/mL of CMVQNT   Date Value Ref Range Status   09/13/2020 Not Calculated <2.1 [Log_IU]/mL Final   09/01/2020 Not Calculated <2.1 [Log_IU]/mL Final   08/19/2020 Not Calculated <2.1 [Log_IU]/mL Final   08/13/2020 Not Calculated <2.1 [Log_IU]/mL Final   06/15/2020 Not Calculated <2.1 [Log_IU]/mL Final       CMV resistance testing  No lab results found.  No results found for: CMVCID, CMVFOS, CMVGAN     No results found for: H6RES    EBV DNA Copies/mL   Date Value Ref Range Status   09/01/2020 EBV DNA Not Detected EBVNEG^EBV DNA Not Detected [Copies]/mL Final   08/13/2020 EBV DNA Not Detected EBVNEG^EBV DNA Not Detected [Copies]/mL Final   06/15/2020 EBV DNA Not Detected EBVNEG^EBV DNA Not Detected [Copies]/mL Final       BK viral loads   Recent Labs   Lab Test 08/19/20  1801   BKSPEC Plasma   BKRES BK Virus DNA Not Detected         Pathology   RUL FNA 9/17/2020  Lung, right upper lobe lesion, endobronchial ultrasound guided fine needle    aspiration:   - Negative for malignancy   TBBx 9/8/2020  FINAL DIAGNOSIS:   A. No specimen  received.   B. LUNG, RIGHT UPPER LOBE, ENDOBRONCHIAL BIOPSIES:   - Two small fragments of respiratory mucosa with chronic inflammation,   frequent eosinophils, submucosal edema   and focal squamous metaplasia.   - Negative for granulomatous inflammation or malignancy in this biopsy,       Imaging:  CT 9/21/2020 reviewed with the radiologist  Impression: Positive treatment response as evidenced by:  1. Moderate decrease in size of right suprahilar mass. This mass has  aggressive features including mediastinal and bronchial invasion as  well as mediastinal lymphadenopathy. These invasive features have  improved.  2. Previously occluded right upper lobe bronchi are now patent,  although remains mildly narrowed.  3. Improvement and bronchocentric groundglass and nodular opacities in  the right lower lobe favored to be infectious.  4. Near complete resolution of right-sided pleural effusion with  resolution of intralobular septal thickening in the right lower lobe.  CT chest 9/13/2020  IMPRESSION:  1. Increased size of the right hilar mass/consolidation with  associated narrowing of the subsegmental right upper lobe bronchioles  and multiple scattered pulmonary nodules as above. Differential  continues to include infection versus malignancy with postobstructive  pneumonia.  2. Increased bibasilar groundglass and right lung tree-in-bud nodular  opacities suggestive of infection, possibly secondary to aspiration.  Small right pleural effusion.  3. Surgical changes of cardiac transplantation.  4. Cholelithiasis without cholecystitis.  CT chest 9/2/2020   IMPRESSION:   1. New mass vs consolidation in the right upper lobe and right hilum  since 7/29/2020 with narrowing of multiple right upper lobe bronchi.   1a. Differential pneumonia with reactive right hilar lymphadenopathy  vs. malignancy(ex. Lymphoma/PTLD) with post obstructive pneumonia.  2. Stable additional pulmonary lesions as above.  3. Stable postsurgical changes of  heart transplant.  4. Cholelithiasis without evidence of cholecystitis.        Tavares Rodriguez MD  Pager: [phone]

## 2020-09-23 ENCOUNTER — APPOINTMENT (OUTPATIENT)
Dept: GENERAL RADIOLOGY | Facility: CLINIC | Age: 60
End: 2020-09-23
Attending: NURSE PRACTITIONER
Payer: COMMERCIAL

## 2020-09-23 ENCOUNTER — PRE VISIT (OUTPATIENT)
Dept: TRANSPLANT | Facility: CLINIC | Age: 60
End: 2020-09-23

## 2020-09-23 ENCOUNTER — APPOINTMENT (OUTPATIENT)
Dept: OCCUPATIONAL THERAPY | Facility: CLINIC | Age: 60
End: 2020-09-23
Payer: COMMERCIAL

## 2020-09-23 ENCOUNTER — APPOINTMENT (OUTPATIENT)
Dept: SPEECH THERAPY | Facility: CLINIC | Age: 60
End: 2020-09-23
Attending: NURSE PRACTITIONER
Payer: COMMERCIAL

## 2020-09-23 DIAGNOSIS — Z94.1 HEART REPLACED BY TRANSPLANT (H): Primary | ICD-10-CM

## 2020-09-23 LAB
ANION GAP SERPL CALCULATED.3IONS-SCNC: 8 MMOL/L (ref 3–14)
BUN SERPL-MCNC: 35 MG/DL (ref 7–30)
CALCIUM SERPL-MCNC: 9.2 MG/DL (ref 8.5–10.1)
CHLORIDE SERPL-SCNC: 107 MMOL/L (ref 94–109)
CO2 SERPL-SCNC: 20 MMOL/L (ref 20–32)
CREAT SERPL-MCNC: 1.72 MG/DL (ref 0.66–1.25)
ERYTHROCYTE [DISTWIDTH] IN BLOOD BY AUTOMATED COUNT: 15.2 % (ref 10–15)
GFR SERPL CREATININE-BSD FRML MDRD: 43 ML/MIN/{1.73_M2}
GLUCOSE BLDC GLUCOMTR-MCNC: 177 MG/DL (ref 70–99)
GLUCOSE BLDC GLUCOMTR-MCNC: 194 MG/DL (ref 70–99)
GLUCOSE BLDC GLUCOMTR-MCNC: 208 MG/DL (ref 70–99)
GLUCOSE BLDC GLUCOMTR-MCNC: 218 MG/DL (ref 70–99)
GLUCOSE BLDC GLUCOMTR-MCNC: 227 MG/DL (ref 70–99)
GLUCOSE BLDC GLUCOMTR-MCNC: 227 MG/DL (ref 70–99)
GLUCOSE SERPL-MCNC: 180 MG/DL (ref 70–99)
HCT VFR BLD AUTO: 30.1 % (ref 40–53)
HGB BLD-MCNC: 9.1 G/DL (ref 13.3–17.7)
MAGNESIUM SERPL-MCNC: 2 MG/DL (ref 1.6–2.3)
MCH RBC QN AUTO: 27.7 PG (ref 26.5–33)
MCHC RBC AUTO-ENTMCNC: 30.2 G/DL (ref 31.5–36.5)
MCV RBC AUTO: 92 FL (ref 78–100)
PHOSPHATE SERPL-MCNC: 3.3 MG/DL (ref 2.5–4.5)
PLATELET # BLD AUTO: 345 10E9/L (ref 150–450)
POTASSIUM SERPL-SCNC: 5 MMOL/L (ref 3.4–5.3)
RBC # BLD AUTO: 3.28 10E12/L (ref 4.4–5.9)
SODIUM SERPL-SCNC: 135 MMOL/L (ref 133–144)
WBC # BLD AUTO: 3.7 10E9/L (ref 4–11)

## 2020-09-23 PROCEDURE — 97110 THERAPEUTIC EXERCISES: CPT | Mod: GO

## 2020-09-23 PROCEDURE — 84100 ASSAY OF PHOSPHORUS: CPT | Performed by: PHYSICIAN ASSISTANT

## 2020-09-23 PROCEDURE — 25000131 ZZH RX MED GY IP 250 OP 636 PS 637: Performed by: NURSE PRACTITIONER

## 2020-09-23 PROCEDURE — 92611 MOTION FLUOROSCOPY/SWALLOW: CPT | Mod: GN

## 2020-09-23 PROCEDURE — 25000128 H RX IP 250 OP 636: Performed by: PHYSICIAN ASSISTANT

## 2020-09-23 PROCEDURE — 97535 SELF CARE MNGMENT TRAINING: CPT | Mod: GO

## 2020-09-23 PROCEDURE — 36415 COLL VENOUS BLD VENIPUNCTURE: CPT | Performed by: PHYSICIAN ASSISTANT

## 2020-09-23 PROCEDURE — 85027 COMPLETE CBC AUTOMATED: CPT | Performed by: PHYSICIAN ASSISTANT

## 2020-09-23 PROCEDURE — 25000132 ZZH RX MED GY IP 250 OP 250 PS 637: Performed by: NURSE PRACTITIONER

## 2020-09-23 PROCEDURE — 25000128 H RX IP 250 OP 636: Performed by: NURSE PRACTITIONER

## 2020-09-23 PROCEDURE — 80048 BASIC METABOLIC PNL TOTAL CA: CPT | Performed by: PHYSICIAN ASSISTANT

## 2020-09-23 PROCEDURE — 74220 X-RAY XM ESOPHAGUS 1CNTRST: CPT

## 2020-09-23 PROCEDURE — 74230 X-RAY XM SWLNG FUNCJ C+: CPT

## 2020-09-23 PROCEDURE — 25000132 ZZH RX MED GY IP 250 OP 250 PS 637: Performed by: STUDENT IN AN ORGANIZED HEALTH CARE EDUCATION/TRAINING PROGRAM

## 2020-09-23 PROCEDURE — 00000146 ZZHCL STATISTIC GLUCOSE BY METER IP

## 2020-09-23 PROCEDURE — 83735 ASSAY OF MAGNESIUM: CPT | Performed by: PHYSICIAN ASSISTANT

## 2020-09-23 PROCEDURE — 25000128 H RX IP 250 OP 636: Performed by: STUDENT IN AN ORGANIZED HEALTH CARE EDUCATION/TRAINING PROGRAM

## 2020-09-23 PROCEDURE — 25000132 ZZH RX MED GY IP 250 OP 250 PS 637: Performed by: INTERNAL MEDICINE

## 2020-09-23 PROCEDURE — 25500045 ZZH RX 255: Performed by: RADIOLOGY

## 2020-09-23 PROCEDURE — 21400000 ZZH R&B CCU UMMC

## 2020-09-23 RX ORDER — MYCOPHENOLATE MOFETIL 500 MG/1
500 TABLET ORAL 2 TIMES DAILY
Qty: 180 TABLET | Refills: 11 | COMMUNITY
Start: 2020-09-23 | End: 2020-11-10

## 2020-09-23 RX ORDER — CLINDAMYCIN HCL 150 MG
450 CAPSULE ORAL EVERY 8 HOURS
Qty: 90 CAPSULE | Refills: 0 | Status: SHIPPED | OUTPATIENT
Start: 2020-09-23 | End: 2020-09-24

## 2020-09-23 RX ORDER — HYDRALAZINE HYDROCHLORIDE 25 MG/1
100 TABLET, FILM COATED ORAL 3 TIMES DAILY
Qty: 270 TABLET | Refills: 3 | Status: SHIPPED | OUTPATIENT
Start: 2020-09-23 | End: 2020-10-13

## 2020-09-23 RX ORDER — LOPERAMIDE HCL 2 MG
2 CAPSULE ORAL 4 TIMES DAILY PRN
Qty: 60 CAPSULE | Refills: 0 | Status: SHIPPED | OUTPATIENT
Start: 2020-09-23 | End: 2020-09-24

## 2020-09-23 RX ORDER — HYDRALAZINE HYDROCHLORIDE 100 MG/1
100 TABLET, FILM COATED ORAL 3 TIMES DAILY
Status: DISCONTINUED | OUTPATIENT
Start: 2020-09-23 | End: 2020-09-24 | Stop reason: HOSPADM

## 2020-09-23 RX ORDER — CIPROFLOXACIN 500 MG/1
500 TABLET, FILM COATED ORAL EVERY 12 HOURS
Qty: 60 TABLET | Refills: 0 | Status: SHIPPED | OUTPATIENT
Start: 2020-09-23 | End: 2020-09-24

## 2020-09-23 RX ORDER — LANOLIN ALCOHOL/MO/W.PET/CERES
3 CREAM (GRAM) TOPICAL
Status: DISCONTINUED | OUTPATIENT
Start: 2020-09-23 | End: 2020-09-24 | Stop reason: HOSPADM

## 2020-09-23 RX ORDER — MULTIPLE VITAMINS W/ MINERALS TAB 9MG-400MCG
1 TAB ORAL DAILY
Qty: 90 TABLET | Refills: 3 | Status: SHIPPED | OUTPATIENT
Start: 2020-09-24 | End: 2020-09-24

## 2020-09-23 RX ORDER — LIDOCAINE 40 MG/G
CREAM TOPICAL
Status: CANCELLED | OUTPATIENT
Start: 2020-09-23

## 2020-09-23 RX ADMIN — TACROLIMUS 3 MG: 1 CAPSULE ORAL at 18:08

## 2020-09-23 RX ADMIN — TACROLIMUS 2 MG: 1 CAPSULE ORAL at 07:51

## 2020-09-23 RX ADMIN — Medication 1 TABLET: at 07:51

## 2020-09-23 RX ADMIN — MYCOPHENOLATE MOFETIL 500 MG: 500 TABLET ORAL at 18:08

## 2020-09-23 RX ADMIN — CLINDAMYCIN HYDROCHLORIDE 450 MG: 300 CAPSULE ORAL at 14:10

## 2020-09-23 RX ADMIN — MAGNESIUM SULFATE IN WATER 2 G: 40 INJECTION, SOLUTION INTRAVENOUS at 08:04

## 2020-09-23 RX ADMIN — HYDRALAZINE HYDROCHLORIDE 10 MG: 20 INJECTION INTRAMUSCULAR; INTRAVENOUS at 05:41

## 2020-09-23 RX ADMIN — MULTIPLE VITAMINS W/ MINERALS TAB 1 TABLET: TAB at 07:51

## 2020-09-23 RX ADMIN — MYCOPHENOLATE MOFETIL 500 MG: 500 TABLET ORAL at 07:51

## 2020-09-23 RX ADMIN — ONDANSETRON HYDROCHLORIDE 4 MG: 4 TABLET, FILM COATED ORAL at 08:04

## 2020-09-23 RX ADMIN — CLINDAMYCIN HYDROCHLORIDE 450 MG: 300 CAPSULE ORAL at 05:34

## 2020-09-23 RX ADMIN — VALGANCICLOVIR 450 MG: 450 TABLET, FILM COATED ORAL at 07:51

## 2020-09-23 RX ADMIN — CIPROFLOXACIN HYDROCHLORIDE 500 MG: 500 TABLET, FILM COATED ORAL at 20:36

## 2020-09-23 RX ADMIN — ACETAMINOPHEN 975 MG: 325 TABLET, FILM COATED ORAL at 07:51

## 2020-09-23 RX ADMIN — HYDRALAZINE HYDROCHLORIDE 100 MG: 100 TABLET, FILM COATED ORAL at 14:10

## 2020-09-23 RX ADMIN — Medication 1 TABLET: at 18:08

## 2020-09-23 RX ADMIN — CLINDAMYCIN HYDROCHLORIDE 450 MG: 300 CAPSULE ORAL at 22:01

## 2020-09-23 RX ADMIN — ROSUVASTATIN CALCIUM 10 MG: 10 TABLET, FILM COATED ORAL at 20:35

## 2020-09-23 RX ADMIN — HYDRALAZINE HYDROCHLORIDE 100 MG: 100 TABLET, FILM COATED ORAL at 07:51

## 2020-09-23 RX ADMIN — ANTACID/ANTIFLATULENT 4 G: 380; 550; 10; 10 GRANULE, EFFERVESCENT ORAL at 13:37

## 2020-09-23 RX ADMIN — CIPROFLOXACIN HYDROCHLORIDE 500 MG: 500 TABLET, FILM COATED ORAL at 07:51

## 2020-09-23 ASSESSMENT — ACTIVITIES OF DAILY LIVING (ADL)
ADLS_ACUITY_SCORE: 13

## 2020-09-23 ASSESSMENT — MIFFLIN-ST. JEOR: SCORE: 1546.58

## 2020-09-23 NOTE — PROGRESS NOTES
"  [facility]   Cardiology II Service / Advanced Heart Failure  Daily Progress Note      Patient: Mariusz Sharp  MRN: [id number]  Admission Date: 8/31/2020  Hospital Day # 23    Assessment and Plan: Mariusz \"Red\" Aron is a 59 year old male with history of CAD (STEMI with ALYSSA to LAD 6/27/18), ICM (LVEF 20-25%) s/p HM3 LVAD (12/31/18), monomorphic VT (s/p ICD with shocks x4 7/16/18), LV thrombus, CKD, elevated PSA, pAF, HTN, HL, and DMII, now s/p OHT 5/18/20, who presented with progressive SOB, cough, and fever, and was found to have a RUL mass concerning for malignancy vs PNA.    Today's Plan:  -XR video study and esophagram today  -recheck tacrolimus level in AM  -change to oral abx  -discharge in AM    #Post-obstructive versus aspiration pneumonia  #Cryptogenic RUL mass vs consolidation in the RUL and right hilum with narrowing of multiple RUL bronchi (since 7/29)  #Tissue cx +abiotrophia defectiva  Diagnosed with rhinovirus 8/2020. P/w fever and cough.   Completed Work-up:  - 8/31: Fungitell assay was low-grade positive/stable, and corresponding galactomannan antigen assay was negative. COVID negative.  - 9/1: Strep pneumo, CDiff, cryptococcal antigen, toxoplasma, legionella negative.  CMV and EBV negative.  - 9/2: PJP PCR of sputum negative.  CT Chest showing new RUL mass concerning for PTLD vs infection, new since last chest CT 7/29.  - 9/4: Aspergillus, coccidiodes, histoplasma, and blastomycis negative.  - 9/7: BCs negative.  - 9/8: Bronchial lavage of right lung negative for malignancy but GMS stain positive for rare pseudohyphae. Nocardia negative. PJP negative. FNA negative. FNA biopsy negative for malignancy.  - 9/8: Bronchial lavage cultures no growth, fungal DNA 28S negative  - 9/9:  CDiff negative, BCs negative.  - 9/12:  Absolute CD3 618, CD4 359, CD8 114, CD3 Mature T 71, CD4:CD8 ratio 3.15, CD4 [place] T 41, CD8 Suppressor T 13.  - 9/13: EBV negative. IgA 108, IgG 510, IgM 29.  Repeat " chest CT showed increase in size of RUL mass and post-obstructive PNA.  - 9/14: COVID negative.  - 9/15: Bronchial lavage of right lung negative for malignancy and negative for PJP and fungal orgamisms.  - 9/17: CDiff negative.  RUL culture negative to date, nocardia negative to date.  Leukemia/lymphoma eval negative.  FNA negative for malignancy.  Bronchial lavage of right lung nondiagnostic. Tissue from RUL showing abiotrophia defectiva, oral microbe, possible contaminant per Dr. Rodriguez but responding to abx (CT improved, clinically improved) so will continue to treat.  - 9/17 Actinomyces, nocardia, legionella, KOH negative  - 9/17: Right upper lung biopsy non diagnostic, scant alveolar macrophages.       Pending:  - 9/14: Viral respiratory cultures in process  - 9/15: Bronchial lavage cultures no growth     Antibiotics:  - vanc/zosyn 8/31-9/1  - Cefdnir 9/1, stopped 9/13  - Posaconazole 9/4-9/13, no beneficial effect on either fevers nor the size of the RUL mass despite therapeutic levels  - Atovaquone 9/12-9/15   - Zosyn restarted 9/13 (when cefdinir and posaconazole were stopped)     Plan:  - Zosyn 9/13/20-9/22/20 for presumptive post-obstructive (or aspiration) right lung pneumonia  - start PO cipro 500 mg bid and clindamycin 450 mg tid through 10/11  - XR video swallow and esophagram today given concern for aspiration PNA and hiatal hernia  - per transplant ID, no need to pursue further bx as his CT chest is improving   - if further biopsy indicated, may need VATS  - repeat chest CT 10/2 (10/3 a Saturday) and f/u with Dr Rodriguez on 10/6     #s/p orthotopic heart transplant 5/18/20 for NICM  Post-operative course c/b NSVT, leukocytosis with C Acnes growing from his former LVAD site (thought to be a contaminant, treated with IV vanco --> po doxy), and in the setting of donor blood growing S anginosus and Veillonella (also thought to be a contaminant, but treated with Vanco/zosyn --> Vanco/Flagyl for a total of 7  days).      SELVIN 8/31 LVEF >70% and normal RV size/function  RHC 8/26 RA 3, mPA 15, PCW 10, and CI 2.63.     Graft function:  - BP: 126-141/88-100s, increase hydralazine to 100 mg tid  - HR: 110s  - Fluid status: euvolemic to hypovolemic, encourage PO     Immunosuppression:  - MMF 500mg bid (decreased 9/8 due to low ImmuKnow)  - tacro goal level 8-10, increased to 2 mg in AM 3 mg in PM for level 4.6, repeat level in AM    Serostatus: CMV D+/R- EBV D-/R+ Toxo D-/R-     PPx:  - CAV: aspirin held due to hematuria on UA and anemia, continue crestor 10mg daily  - CMV: valcyte 450mg EOD (renally dosed), x6 months (through 11/2020)  - Osteoporosis: calcium/vitamin D   - GI: pantoprazole 40mg daily     Health maintenance:  - month 4 surveillance due 9/30  - needs baseline cor angio when renal function stable  - needs flu shot ?prior to discharge      #Pleuritic chest pain   Worse with coughing. Likely MSK due to frequent coughing with some pleurisy. Relieved with APAP.  - scheduled APAP 975 mg q8hr, patient declining opioid like Rx or topicals at this time    #ANDREW on CKD due to hypovolemia and tacrolimus  #Hyponatremia, hypovolemic  PTA Cr 1.3-1.7. UA protein 30, RBC 53. Cr peaked at 3.88, since trended down.  Multiple renal ultrasounds negative for obstructive stones and hydronephrosis. Urine sodium 52 and FeNA 2.5%, suggesting intrinsic renal process.   - renal consulted, signed off    - likely ATN due to a combination of mild volume depletion and tacrolimus.    - daily BMP and tacro levels  - avoid hypovolemia, encouraging PO intake     #Mild transaminitis, resolved  Likely drug induced. ALT 79  at peak. Improving daily, now normal.  - monitor intermittently     OTHER:  #NSVT, resolved:  Postoperative, no hemodynamic compromise.  #Elevated PSA with suspected chronic prostatitis:  Prostate MRI showed signs of BPH. PSA 10.8 8/2020, 8.8 9/2020. Antibiotic course as above.  #DMII: Continue insulin management per  "endocrine. Insulin gtt stopped 9/19. Stopped TFs 9/20, NJT pulled 9/21. Endocrine following.   #Diarrhea, intermittent, improving:  CDiff and CMV negative on admission, enteric panel negative 9/9, CDiff again negative 9/17. Diarrhea attributed to TFs. PRN imodium.  #HTN:  PTA amlodipine held, see above.     FEN: regular diet  PROPHY: ambulatory  LINES: PIV  DISPO: discharge tomorrow morning  CODE STATUS:  Full code    Dinora Leroy, LEX, NP-C  Advanced Heart Failure/Cardiology II Service  Pager [phone] [facility] [id number]    ================================================================    Subjective/24-Hr Events:   Last 24 hr care team notes reviewed. No overnight events. [place] nausea this AM taking pills on empty stomach. No fevers, no cough. Feeling better overall. BPs elevated over last few days. Working with therapies,     ROS:  4 point ROS including respiratory, CV, GI and  (other than that noted in the HPI) is negative.     Medications: Reviewed in EPIC.     Physical Exam:   BP (!) 126/93 (BP Location: Left arm, Cuff Size: Adult Regular)   Pulse 119   Temp 98.4  F (36.9  C) (Oral)   Resp 16   Ht 1.626 m (5' 4\")   Wt 81.1 kg (178 lb 11.2 oz)   SpO2 96%   BMI 30.67 kg/m      GENERAL: Appears comfortable, in no distress.  HEENT: Eye symmetrical, no discharge or icterus bilaterally. Mucous membranes moist and without lesions.  NECK: Supple, JVD not visible.   CV: Tachycardic and regular, +S1S2, no murmur, rub, or gallop.   RESPIRATORY: Respirations regular, even, and unlabored. Lungs CTA throughout.    GI: Soft, obese and non distended with normoactive bowel sounds present in all quadrants. No tenderness, rebound, guarding.   EXTREMITIES: No peripheral edema. 2+ bilateral pedal pulses.   NEUROLOGIC: Alert and oriented x 3. No focal deficits.   MUSCULOSKELETAL: No joint swelling or tenderness.   SKIN: No jaundice. No rashes or lesions.     Labs:  CMP  Recent Labs   Lab 09/23/20  0531 09/22/20  0530 " 09/21/20  0527 09/20/20  0605 09/19/20  0513    135 137 135 135   POTASSIUM 5.0 4.9 5.1 5.1 5.1   CHLORIDE 107 108 110* 108 108   CO2 20 20 20 20 21   ANIONGAP 8 8 7 7 7   * 160* 160* 336* 171*   BUN 35* 33* 37* 46* 57*   CR 1.72* 1.76* 1.86* 2.29* 2.94*   GFRESTIMATED 43* 42* 39* 30* 22*   GFRESTBLACK 50* 48* 45* 35* 26*   ARLENE 9.2 8.5 8.5 8.1* 8.4*   MAG 2.0 1.8 1.6 1.6 1.9   PHOS 3.3 3.3 3.6 2.2* 2.9   PROTTOTAL  --  6.1* 6.0* 5.9* 5.6*   ALBUMIN  --  2.3* 2.1* 2.0* 1.9*   BILITOTAL  --  0.5 0.4 0.3 0.3   ALKPHOS  --  84 84 100 95   AST  --  38 52* 105* 87*   ALT  --  69 82* 109* 74*       CBC  Recent Labs   Lab 09/23/20  0531 09/21/20  0527 09/20/20  0605 09/19/20  0513   WBC 3.7* 3.3* 3.8* 5.1   RBC 3.28* 2.94* 2.99* 2.90*   HGB 9.1* 8.1* 8.2* 7.8*   HCT 30.1* 26.7* 27.7* 26.5*   MCV 92 91 93 91   MCH 27.7 27.6 27.4 26.9   MCHC 30.2* 30.3* 29.6* 29.4*   RDW 15.2* 14.6 14.5 14.1    348 380 411       Time/Communication  I personally spent a total of 25 minutes. Of that 15 minutes was counseling/coordination of patient's care. Plan of care discussed with patient. See my note above for details.    Patient discussed with Dr. Stone.

## 2020-09-23 NOTE — PROGRESS NOTES
09/23/20 1346   General Information   Onset Date 08/31/20   Start of Care Date 09/23/20   Referring Physician Marcy Harper, CARMEN CNP   Patient Profile Review/OT: Additional Occupational Profile Info See Profile for full history and prior level of function   Patient/Family Goals Statement None stated   Swallowing Evaluation Videofluoroscopic evaluation   Behaviorial Observations WFL (within functional limits)   Mode of current nutrition Oral diet   Type of oral diet Regular   Respiratory Status Room air   Comments SLP: Pt is a 59 year old male with history of CAD (STEMI with ALYSSA to LAD 6/27/18), ICM (LVEF 20-25%) s/p HM3 LVAD (12/31/18), monomorphic VT (s/p ICD with shocks x4 7/16/18), LV thrombus, CKD, elevated PSA, pAF, HTN, HL, and DMII, now s/p OHT 5/18/20, who presented with progressive SOB, cough, and fever, and was found to have a RUL mass concerning for malignancy vs PNA. Pt endorsed having difficulty swallowing when he had an NJ in place but reports that has since resolved. VFSS completed per MD order.    Clinical Swallow Evaluation   Oral Musculature generally intact   Structural Abnormalities none present   Dentition present and adequate   Mucosal Quality good   Mandibular Strength and Mobility intact   Oral Labial Strength and Mobility WFL   Lingual Strength and Mobility WFL   Buccal Strength and Mobility intact   Laryngeal Function Cough;Throat clear;Swallow;Voicing initiated   Oral Musculature Comments WFL   VFSS Eval: Radiology   Radiologist Resident   Views Taken left lateral   Physical Location of Procedure [facility] [address]   VFSS Eval: Thin Liquid Texture Trial   Mode of Presentation, Thin Liquid cup;self-fed   Order of Presentation 1, 2, 5   Preparatory Phase WFL   Pharyngeal Phase, Thin Liquid WFL   Rosenbek's Penetration Aspiration Scale: Thin Liquid Trial Results 1 - no aspiration, contrast does not enter airway   Diagnostic Statement WFL, no aspiration   VFSS Eval: Puree Solid  Texture Trial   Mode of Presentation, Puree spoon;self-fed   Order of Presentation 3   Preparatory Phase WFL   Oral Phase, Puree WFL   Pharyngeal Phase, Puree WFL   Rosenbek's Penetration Aspiration Scale: Puree Food Trial Results 1 - no aspiration, contrast does not enter airway   Diagnostic Statement WFL, no aspiration   VFSS Eval: Solid Food Texture Trial   Mode of Presentation, Solid self-fed   Order of Presentation 4   Preparatory Phase WFL   Oral Phase, Solid WFL   Pharyngeal Phase, Solid WFL   Rosenbek's Penetration Aspiration Scale: Solid Food Trial Results 1 - no aspiration, contrast does not enter airway   Diagnostic Statement WFL, no aspiration, no pharyngeal residue despite report of globus at level of sternal notch   Esophageal Phase of Swallow   Patient reports or presents with symptoms of esophageal dysphagia Yes   Esophageal sweep performed during today s vidofluoroscopic exam  Please refer to radiologist's report for details   Esophageal comments Pt endorsed globus in throat with both pudding and cookie, but no residue apparent on images   Swallow Eval: Clinical Impressions   Skilled Criteria for Therapy Intervention No problems identified which require skilled intervention   Functional Assessment Scale (FAS) 7   Treatment Diagnosis Functional oropharyngeal swallow mechanism   Diet texture recommendations Regular diet;Thin liquids   Recommended Feeding/Eating Techniques small sips/bites  (slow rate, upright for all PO)   Risks and Benefits of Treatment have been explained. Yes   Patient, family and/or staff in agreement with Plan of Care Yes   Clinical Impression Comments SLP: Videoswallow study completed per MD order to objectively assess oropharyngeal swallow mechanism. Under fluoroscopy, pt presents with a functional oropharyngeal swallow mechanism. Pt assessed with thin liquids, puree, and cookie. Oral phase WFL. Swallow trigger timely. Once triggered, velar elevation, BOT retraction, and  pharyngeal constriction are complete. Epiglottic inversion complete. No aspiration with any consistency. Recommend regular diet/thin liquids. Pt to be fully alert and upright for all PO, taking small bites/sips at slow rate. SLP to follow.   Total Evaluation Time   Total Evaluation Time (Minutes) 21

## 2020-09-23 NOTE — PLAN OF CARE
OT 6C  Discharge Planner OT   Patient plan for discharge: Home  Current status: IND walking ~1500 ft. IND STS and g/h standing at sink.  Barriers to return to prior living situation: acute medical needs, decreased exercise endurance  Recommendations for discharge: Home with resumption of OP CR Phase II  Rationale for recommendations: Pt progressing with therapy and will continued to benefit from skilled therapy to increased exercise endurance.       Entered by: Madelyn Miranda 09/23/2020 11:19 AM

## 2020-09-23 NOTE — PLAN OF CARE
Discharge Planner SLP   Patient plan for discharge: Unknown  Current status: Videoswallow study completed per MD order to objectively assess oropharyngeal swallow mechanism. Under fluoroscopy, pt presents with a functional oropharyngeal swallow mechanism. Pt assessed with thin liquids, puree, and cookie. Oral phase WFL. Swallow trigger timely. Once triggered, velar elevation, BOT retraction, and pharyngeal constriction are complete. Epiglottic inversion complete. No aspiration with any consistency. Recommend regular diet/thin liquids. Pt to be fully alert and upright for all PO, taking small bites/sips at slow rate. SLP to follow.  Barriers to return to prior living situation: None from ST perspective  Recommendations for discharge: Defer to MD  Rationale for recommendations: Functional oropharyngeal swallow mechanism       Entered by: Crissy Alex 09/23/2020 1:50 PM

## 2020-09-23 NOTE — PLAN OF CARE
D: Admitted 8/31 with syncope, SOB, cough, and fever. Found to have mass on RUL. History of HTN, CAD, CKD3, LV thrombus, PAF, DM2, ICM s/p LVAD HM3 s/p heart tx 5/18/20.     I/A: VSS on room air. SR/ST on monitor. Good appetite. Up ad domingo. Video swallow and esophagram done.    P: Tacro level in AM. Anticipate discharge to home ([place]) in AM.

## 2020-09-23 NOTE — PROGRESS NOTES
Transplant Social Work Services Discharge Note      Date of Initial Social Work Evaluation: 7/17/18. LVAD implant 12/31/2018. Admission assessment completed 9/1/20  Collaborated with: Patient    Data: Red remains on 6C recovering from infection, but reports feeling much better. Has video swallow done today and reports that the thought from his doctors is that he aspirated something, causing infection in his lungs. He reports that he will be able to discharge home to [place] tomorrow and that his Brother will be picking him up.     Intervention: Met with Red to discuss medical journey in the hospital and provide emotional support and normalized his lengthy hospital stay. Inquired into questions/concerns and assessed coping.     Assessment: Red appeared to have a bright affect and mentioned having no psychosocial concerns with his discharge tomorrow. Coping appears appropriate.  Education provided by SW: Ongoing SW availability for further resources  Plan:    Discharge Plans in Progress: Home to [place] with Brother    Barriers to d/c plan: None    Follow up Plan: SW will remain available if needs arise, but anticipate no SW needs for discharge tomorrow. Will continue to follow outpatient as transplant  if needs develop.

## 2020-09-23 NOTE — PLAN OF CARE
D: Pt admitted for syncope, SOB, cough, and fever. Found to have RUL mass. PMHx CAD, STEMI s/p stents (6/2018),monomorphic VT s/p ICD w/ [id number] (7/2018), ICM s/p HM3 (12/2018) now s/p OHT 5/18/20, LV thrombus, CKD, pAF, HTN, HLD, DM2.    I/A: Slightly hypertensive, 120s-130s/90s-100s - IV hydralazine givenx1. On RA. Afebrile. ST, 100s-110s. Denies pain. AOx4. NPO for procedure today. BG checks ACHS. On carb count. BMx1 this shift. Voiding via urinal - good amounts. PIV SL. Up independently. Slept between cares.     P: Plan is for video swallow and esophagram today at 1300. Continue to monitor and follow POC. Notify team of any changes.

## 2020-09-23 NOTE — PLAN OF CARE
Admitted 8/31 with syncope, SOB, cough, and fever. Found to have mass on RUL. History of HTN, CAD, CKD3, LV thrombus, PAF, DM2, ICM s/p LVAD HM3 s/p heart tx 5/18/20.-130s. Up ad domingo in the room. Continues on IV abx. To have video swallow study and esophagram possibly at 1 PM tomorrow. States he was told he might go home tomorrow.Sats high 90s on RA.

## 2020-09-23 NOTE — PROGRESS NOTES
"IP Diabetes Management  Daily Note           Assessment and Plan:   HPI: Mariusz Sharp \"Red\" is a 59 year old male with history of CAD, LV thrombus, CKD Stage III, PAF, DM Type II, and ICM s/p HM III LVAD as BTT (2018) with subsequent OHT 5/18/20, presenting with progressive SOB, cough, and fever, found to have right hilar lung mass with infiltrate. S/P bronch with biopsy 9/15. Poor PO intake, requiring temporary initiation of enteral feeds, now discontinued.     Assessment:   1)Type II Diabetes Mellitus,  2) Enteral feed-induced hyperglycemia - resolved, now off TF    Plan:    -increase Lantus 20> 24 units HS   -aspart 1:5g CHO coverage with meals and snacks/supplements (increased with lunch yesterday)   -increased from aspart high, to custom 1/20  intensity sliding scale TID AC and HS   -BG monitoring TID AC, HS, 0200   -hypoglycemia protocol   -diet with carb counting protocol   -diabetes education needs are not identified this admission.    Tentative Plan for discharge: will finalize, place in discharge AVS and review with patient tomorrow   -Lantus 24 units daily bedtime.    -Novolog 1 unit per 5g CHO with meals, snacks   -Novolog 1/20 (custom intensity) sliding scale before meals and at bedtime    Mealtime Scale  BG less than 140, none   to 159 = 1 unit   to 179 = 2    to 199 = 3   to 219 = 4    to 239 = 5    to 259 = 6    to 279 = 7    to 299 = 8    to 319 = 9    to 339 = 10    to 359 = 11    to 379 = 12    to 399 = 13   BG over 400 = 14     Bedtime Scale  BG less than 200, none   to 219 = 1 unit   to 239 = 2    to 259 = 3    to 279 = 4     to 299 = 5     to 319 = 6    to 339 = 7   to 359 = 8     to 379 = 9    to 399 = 10   BG over 400 = 11     Outpatient follow up: with [facility] within 2-3 weeks following discharge.   Plan discussed with patient.    Interval " History and Assessment: interval glucose trend reviewed:   Hyperglycemia yesterday with 1:6g given with large breakfast of 119g CHO. Red does not think he missed coverage for supplements  Improved by evening with increase to 1:5g, pre-dinner 146.   Bedtime BG was checked postprandially, 206.     Fasting glucose today 180>177. He is actually NPO at the moment.   CT with evidence of hiatal hernia. Concern for silent aspiration: SLP consulted and will have swallow study today at 1300.    Lung Bx results from 9/17: no evidence for malignancy, micro culture with Abiotrophia (oral simona, ? Contaminate). AFB, ROCIO, fungus cultures negative to date.   Abx switched to PO: Cipro and Clinda- monitoring for increased glycemic variability with the Cipro. He has a cough today.    Current nutritional intake and type: Orders Placed This Encounter      NPO per Anesthesia Guidelines for Procedure/Surgery Except for: Meds      PTA Diabetes Regimen:   Dexcom G6 CGM  Lantus 32 units daily HS  Novolog 1:8g CHO and high intensity sliding scale AC/HS.    Discharge Planning: per primary team, may discharge to home tomorrow.           Diabetes History:   Type of Diabetes: Type 2 Diabetes Mellitus  Lab Results   Component Value Date    A1C 7.4 08/19/2020    A1C 7.2 08/13/2020    A1C 7.8 06/15/2020    A1C 8.0 05/12/2020    A1C 9.5 07/05/2019              Review of Systems:   The Review of Systems is negative other than noted in the Interval History.           Medications:     Current Facility-Administered Medications   Medication     acetaminophen (TYLENOL) tablet 975 mg     albuterol (PROVENTIL) neb solution 2.5 mg     benzocaine-menthol (CEPACOL) 15-3.6 MG lozenge 1 lozenge     calcium carbonate 600 mg-vitamin D 400 units (CALTRATE) per tablet 1 tablet     ciprofloxacin (CIPRO) tablet 500 mg     clindamycin (CLEOCIN) capsule 450 mg     glucose gel 15-30 g    Or     dextrose 50 % injection 25-50 mL    Or     glucagon injection 1 mg      hydrALAZINE (APRESOLINE) injection 10 mg     hydrALAZINE (APRESOLINE) tablet 75 mg     insulin aspart (NovoLOG) injection (RAPID ACTING)     insulin aspart (NovoLOG) injection (RAPID ACTING)     insulin aspart (NovoLOG) injection (RAPID ACTING)     insulin aspart (NovoLOG) injection (RAPID ACTING)     insulin glargine (LANTUS PEN) injection 20 Units     Lidocaine (LIDOCARE) 4 % Patch 1 patch    And     lidocaine patch in PLACE     lidocaine (LMX4) cream     lidocaine 1 % 0.1-1 mL     loperamide (IMODIUM) capsule 2 mg     magnesium sulfate 2 g in water intermittent infusion     magnesium sulfate 4 g in 100 mL sterile water (premade)     melatonin tablet 3 mg     multivitamin w/minerals (THERA-VIT-M) tablet 1 tablet     mycophenolate (GENERIC EQUIVALENT) tablet 500 mg     naloxone (NARCAN) injection 0.1-0.4 mg     ondansetron (ZOFRAN) tablet 4 mg     oxyCODONE (ROXICODONE) tablet 5 mg     Patient is already receiving mechanical prophylaxis     potassium chloride (KLOR-CON) Packet 20-40 mEq     potassium chloride 10 mEq in 100 mL intermittent infusion with 10 mg lidocaine     potassium chloride 10 mEq in 100 mL sterile water intermittent infusion (premix)     potassium chloride 20 mEq in 50 mL intermittent infusion     potassium chloride ER (KLOR-CON M) CR tablet 20-40 mEq     prochlorperazine (COMPAZINE) injection 5 mg     rosuvastatin (CRESTOR) tablet 10 mg     sodium chloride (NEBUSAL) 3 % neb solution 3 mL     sodium chloride (PF) 0.9% PF flush 3 mL     sodium chloride (PF) 0.9% PF flush 3 mL     sodium phosphate 10 mmol in D5W intermittent infusion     sodium phosphate 15 mmol in D5W intermittent infusion     sodium phosphate 20 mmol in D5W intermittent infusion     sodium phosphate 25 mmol in D5W intermittent infusion     tacrolimus (GENERIC EQUIVALENT) capsule 2 mg     tacrolimus (GENERIC EQUIVALENT) capsule 3 mg     valGANciclovir (VALCYTE) tablet 450 mg            Physical Exam:    BP (!) 141/104 (BP Location:  "Left arm)   Pulse 111   Temp 97.1  F (36.2  C) (Oral)   Resp 17   Ht 1.626 m (5' 4\")   Wt 81.1 kg (178 lb 11.2 oz)   SpO2 98%   BMI 30.67 kg/m    General: pleasant, in no distress, sitting up in chair.  HEENT: normocephalic, atraumatic. Oral mucous membranes moist.   Lungs: unlabored respiration, + dry cough today.  ABD: rounded, soft, no lipodystrophy noted  Skin: warm and dry, no obvious lesions  MSK:  moves all extremities  Lymp:  no LE edema   Mental status:  alert, oriented to self, place, time  Psych:  affect, calm and appropriate interaction             Data:     Recent Labs   Lab 09/23/20  0549 09/23/20  0531 09/22/20  2113 09/22/20  1747 09/22/20  1234 09/22/20  0831 09/22/20  0722 09/22/20  0530  09/21/20  0527  09/20/20  0605  09/19/20  0513  09/18/20  1844   GLC  --  180*  --   --   --   --   --  160*  --  160*  --  336*  --  171*  --  139*   *  --  206* 146* 325* 153* 155*  --    < >  --    < >  --    < >  --    < >  --     < > = values in this interval not displayed.     Lab Results   Component Value Date    WBC 3.7 (L) 09/23/2020    WBC 3.3 (L) 09/21/2020    WBC 3.8 (L) 09/20/2020    HGB 9.1 (L) 09/23/2020    HGB 8.1 (L) 09/21/2020    HGB 8.2 (L) 09/20/2020    HCT 30.1 (L) 09/23/2020    HCT 26.7 (L) 09/21/2020    HCT 27.7 (L) 09/20/2020    MCV 92 09/23/2020    MCV 91 09/21/2020    MCV 93 09/20/2020     09/23/2020     09/21/2020     09/20/2020     Lab Results   Component Value Date     09/23/2020     09/22/2020     09/21/2020    POTASSIUM 5.0 09/23/2020    POTASSIUM 4.9 09/22/2020    POTASSIUM 5.1 09/21/2020    CHLORIDE 107 09/23/2020    CHLORIDE 108 09/22/2020    CHLORIDE 110 (H) 09/21/2020    CO2 20 09/23/2020    CO2 20 09/22/2020    CO2 20 09/21/2020     (H) 09/23/2020     (H) 09/22/2020     (H) 09/21/2020     Lab Results   Component Value Date    BUN 35 (H) 09/23/2020    BUN 33 (H) 09/22/2020    BUN 37 (H) 09/21/2020     Lab " Results   Component Value Date    TSH 3.94 06/26/2019    TSH 6.91 (H) 07/23/2018     Lab Results   Component Value Date    AST 38 09/22/2020    AST 52 (H) 09/21/2020     (H) 09/20/2020    ALT 69 09/22/2020    ALT 82 (H) 09/21/2020     (H) 09/20/2020    ALKPHOS 84 09/22/2020    ALKPHOS 84 09/21/2020    ALKPHOS 100 09/20/2020       Diabetes Management Team job code: [id number]  I spent a total of 35 minutes bedside and on the inpatient unit managing glycemic care. Over 50% of my time on the unit was spent counseling the patient and/or coordinating care regarding acute hyperglycemia management.  See note for details.    Juana Ly PA-C  Inpatient Diabetes Management Service  Pager [phone]

## 2020-09-24 ENCOUNTER — TELEPHONE (OUTPATIENT)
Dept: ENDOCRINOLOGY | Facility: CLINIC | Age: 60
End: 2020-09-24

## 2020-09-24 ENCOUNTER — PATIENT OUTREACH (OUTPATIENT)
Dept: CARE COORDINATION | Facility: CLINIC | Age: 60
End: 2020-09-24

## 2020-09-24 VITALS
RESPIRATION RATE: 18 BRPM | HEIGHT: 64 IN | SYSTOLIC BLOOD PRESSURE: 156 MMHG | HEART RATE: 103 BPM | OXYGEN SATURATION: 96 % | WEIGHT: 175.6 LBS | TEMPERATURE: 98 F | BODY MASS INDEX: 29.98 KG/M2 | DIASTOLIC BLOOD PRESSURE: 109 MMHG

## 2020-09-24 DIAGNOSIS — Z94.1 HEART REPLACED BY TRANSPLANT (H): Primary | ICD-10-CM

## 2020-09-24 LAB
ALBUMIN SERPL-MCNC: 2.3 G/DL (ref 3.4–5)
ALP SERPL-CCNC: 75 U/L (ref 40–150)
ALT SERPL W P-5'-P-CCNC: 46 U/L (ref 0–70)
ANION GAP SERPL CALCULATED.3IONS-SCNC: 8 MMOL/L (ref 3–14)
AST SERPL W P-5'-P-CCNC: 30 U/L (ref 0–45)
BILIRUB DIRECT SERPL-MCNC: <0.1 MG/DL (ref 0–0.2)
BILIRUB SERPL-MCNC: 0.3 MG/DL (ref 0.2–1.3)
BUN SERPL-MCNC: 35 MG/DL (ref 7–30)
CALCIUM SERPL-MCNC: 9 MG/DL (ref 8.5–10.1)
CHLORIDE SERPL-SCNC: 109 MMOL/L (ref 94–109)
CO2 SERPL-SCNC: 19 MMOL/L (ref 20–32)
CREAT SERPL-MCNC: 1.7 MG/DL (ref 0.66–1.25)
ERYTHROCYTE [DISTWIDTH] IN BLOOD BY AUTOMATED COUNT: 15.5 % (ref 10–15)
GFR SERPL CREATININE-BSD FRML MDRD: 44 ML/MIN/{1.73_M2}
GLUCOSE BLDC GLUCOMTR-MCNC: 128 MG/DL (ref 70–99)
GLUCOSE SERPL-MCNC: 107 MG/DL (ref 70–99)
HCT VFR BLD AUTO: 26.8 % (ref 40–53)
HGB BLD-MCNC: 8.2 G/DL (ref 13.3–17.7)
MAGNESIUM SERPL-MCNC: 2 MG/DL (ref 1.6–2.3)
MCH RBC QN AUTO: 28.3 PG (ref 26.5–33)
MCHC RBC AUTO-ENTMCNC: 30.6 G/DL (ref 31.5–36.5)
MCV RBC AUTO: 92 FL (ref 78–100)
PHOSPHATE SERPL-MCNC: 4.1 MG/DL (ref 2.5–4.5)
PLATELET # BLD AUTO: 307 10E9/L (ref 150–450)
POTASSIUM SERPL-SCNC: 5 MMOL/L (ref 3.4–5.3)
PROT SERPL-MCNC: 6 G/DL (ref 6.8–8.8)
RBC # BLD AUTO: 2.9 10E12/L (ref 4.4–5.9)
SODIUM SERPL-SCNC: 136 MMOL/L (ref 133–144)
TACROLIMUS BLD-MCNC: 6.3 UG/L (ref 5–15)
TME LAST DOSE: NORMAL H
WBC # BLD AUTO: 3 10E9/L (ref 4–11)

## 2020-09-24 PROCEDURE — 25000131 ZZH RX MED GY IP 250 OP 636 PS 637: Performed by: NURSE PRACTITIONER

## 2020-09-24 PROCEDURE — 80048 BASIC METABOLIC PNL TOTAL CA: CPT | Performed by: PHYSICIAN ASSISTANT

## 2020-09-24 PROCEDURE — 99239 HOSP IP/OBS DSCHRG MGMT >30: CPT | Performed by: INTERNAL MEDICINE

## 2020-09-24 PROCEDURE — 25000128 H RX IP 250 OP 636: Performed by: NURSE PRACTITIONER

## 2020-09-24 PROCEDURE — 36415 COLL VENOUS BLD VENIPUNCTURE: CPT | Performed by: PHYSICIAN ASSISTANT

## 2020-09-24 PROCEDURE — 00000146 ZZHCL STATISTIC GLUCOSE BY METER IP

## 2020-09-24 PROCEDURE — 25000132 ZZH RX MED GY IP 250 OP 250 PS 637: Performed by: INTERNAL MEDICINE

## 2020-09-24 PROCEDURE — 80197 ASSAY OF TACROLIMUS: CPT | Performed by: NURSE PRACTITIONER

## 2020-09-24 PROCEDURE — 25000132 ZZH RX MED GY IP 250 OP 250 PS 637: Performed by: NURSE PRACTITIONER

## 2020-09-24 PROCEDURE — 84100 ASSAY OF PHOSPHORUS: CPT | Performed by: PHYSICIAN ASSISTANT

## 2020-09-24 PROCEDURE — 85027 COMPLETE CBC AUTOMATED: CPT | Performed by: PHYSICIAN ASSISTANT

## 2020-09-24 PROCEDURE — 80076 HEPATIC FUNCTION PANEL: CPT | Performed by: PHYSICIAN ASSISTANT

## 2020-09-24 PROCEDURE — 25000132 ZZH RX MED GY IP 250 OP 250 PS 637: Performed by: STUDENT IN AN ORGANIZED HEALTH CARE EDUCATION/TRAINING PROGRAM

## 2020-09-24 PROCEDURE — 83735 ASSAY OF MAGNESIUM: CPT | Performed by: PHYSICIAN ASSISTANT

## 2020-09-24 RX ORDER — CIPROFLOXACIN 500 MG/1
500 TABLET, FILM COATED ORAL EVERY 12 HOURS
Qty: 60 TABLET | Refills: 0 | Status: SHIPPED | OUTPATIENT
Start: 2020-09-24 | End: 2020-10-29

## 2020-09-24 RX ORDER — TACROLIMUS 1 MG/1
CAPSULE ORAL
Qty: 180 CAPSULE | Refills: 11 | COMMUNITY
Start: 2020-09-24 | End: 2020-09-24

## 2020-09-24 RX ORDER — LOPERAMIDE HCL 2 MG
2 CAPSULE ORAL 4 TIMES DAILY PRN
Qty: 60 CAPSULE | Refills: 0 | Status: SHIPPED | OUTPATIENT
Start: 2020-09-24

## 2020-09-24 RX ORDER — TACROLIMUS 1 MG/1
CAPSULE ORAL
Qty: 180 CAPSULE | Refills: 11 | Status: SHIPPED | OUTPATIENT
Start: 2020-09-24 | End: 2020-09-30

## 2020-09-24 RX ORDER — MULTIPLE VITAMINS W/ MINERALS TAB 9MG-400MCG
1 TAB ORAL DAILY
Qty: 90 TABLET | Refills: 3 | Status: SHIPPED | OUTPATIENT
Start: 2020-09-24

## 2020-09-24 RX ORDER — CLINDAMYCIN HCL 150 MG
450 CAPSULE ORAL EVERY 8 HOURS
Qty: 90 CAPSULE | Refills: 0 | Status: SHIPPED | OUTPATIENT
Start: 2020-09-24 | End: 2020-10-29

## 2020-09-24 RX ORDER — PANTOPRAZOLE SODIUM 40 MG/1
40 TABLET, DELAYED RELEASE ORAL
Status: DISCONTINUED | OUTPATIENT
Start: 2020-09-24 | End: 2020-09-24 | Stop reason: HOSPADM

## 2020-09-24 RX ORDER — PANTOPRAZOLE SODIUM 40 MG/1
40 TABLET, DELAYED RELEASE ORAL
COMMUNITY
Start: 2020-09-25 | End: 2020-09-28

## 2020-09-24 RX ADMIN — PANTOPRAZOLE SODIUM 40 MG: 40 TABLET, DELAYED RELEASE ORAL at 08:14

## 2020-09-24 RX ADMIN — MYCOPHENOLATE MOFETIL 500 MG: 500 TABLET ORAL at 07:34

## 2020-09-24 RX ADMIN — MULTIPLE VITAMINS W/ MINERALS TAB 1 TABLET: TAB at 07:34

## 2020-09-24 RX ADMIN — TACROLIMUS 2 MG: 1 CAPSULE ORAL at 07:34

## 2020-09-24 RX ADMIN — VALGANCICLOVIR 450 MG: 450 TABLET, FILM COATED ORAL at 08:46

## 2020-09-24 RX ADMIN — Medication 1 TABLET: at 07:35

## 2020-09-24 RX ADMIN — CLINDAMYCIN HYDROCHLORIDE 450 MG: 300 CAPSULE ORAL at 05:23

## 2020-09-24 RX ADMIN — HYDRALAZINE HYDROCHLORIDE 100 MG: 100 TABLET, FILM COATED ORAL at 07:35

## 2020-09-24 RX ADMIN — ACETAMINOPHEN 975 MG: 325 TABLET, FILM COATED ORAL at 07:34

## 2020-09-24 RX ADMIN — MAGNESIUM SULFATE IN WATER 2 G: 40 INJECTION, SOLUTION INTRAVENOUS at 07:33

## 2020-09-24 RX ADMIN — CIPROFLOXACIN HYDROCHLORIDE 500 MG: 500 TABLET, FILM COATED ORAL at 07:35

## 2020-09-24 ASSESSMENT — ACTIVITIES OF DAILY LIVING (ADL)
ADLS_ACUITY_SCORE: 13

## 2020-09-24 ASSESSMENT — MIFFLIN-ST. JEOR: SCORE: 1532.52

## 2020-09-24 NOTE — DISCHARGE INSTRUCTIONS
You have a virtual visit/video visit on 10/6/2020 @ 4:30 PM with Dr. Rodriguez (infectious disease)  We are working on setting up your chest CT and next biopsy for 10/2, we will call you - I need to confirm with Dr. Ortiz she is ok changing this date  Please monitor your blood pressures at home, if they are consistently >140/90, call Angelika and we will restart amlodipine at that time  Take tacrolimus 3 mg twice a day, recheck tacrolimus level on Monday  Do not take aspirin for now, we will restart this in the coming weeks  Make sure to drink water      _____________________    IP Diabetes Management Team Discharge Instructions    Glucose Control Regimen:  1) Lantus 24 units daily at bedtime  2) Novolog 1 unit for every 5g of carbohydrates with meals and snacks. Aim for intake less than 80 grams of carbs per meal.   3) Novolog custom sliding scale: 1 unit per 20 for blood sugar over 140 before meals, and over 200 at bedtime. The scales are posted below.       Custom 1:20 sliding scale  Mealtime Scale, to be given based on blood sugar obtained prior to a meal  For Pre-Meal BG less than 140, no additional insulin needed   to 159 give 1 units.    to 179 give 2 units.    to 199 give 3 units.    to 219 give 4 units.    to 239 give 5 units.    to 259 give 6 units.    to 279 give 7 units.    to 299 give 8 units.    to 319 give 9 units.    to 339 give 10 units.    to 359 give 11 units.    to 379 give 12 units.   to 399 give 13 units.  BG >/=400 give 14 units.    Bedtime Scale, to be given based on pre-bedtime blood sugar  For Bedtime BG less than 200, no additional insulin needed   to 219 give 1 units.    to 239 give 2 units.    to 259 give 3 units.    to 279 give 4 units.    to 299 give 5 units.    to 319 give 6 units.    to 339 give 7 units.    to 359 give 8 units    to 379 give 9 units.    to 399 give 10 units.  BG >/=400 give 11 units.      Blood Glucose Checks: three times daily before meals, and at bedtime.    Endocrinology Outpatient follow up: An appointment request was sent to the [facility] coordinator to schedule your outpatient virtual diabetes appointment 1-2 weeks from discharge. Please call the clinic at [phone] if you do not have an appointment scheduled on discharge, or if you have non-urgent questions regarding your blood sugars or insulin.     If you have urgent questions or concerns regarding your blood sugars or insulin, you may contact [phone] (the main hospital ). Ask to speak with the endocrinologist on call.    Thank you for letting the Diabetes Management Team be involved in your care!

## 2020-09-24 NOTE — DISCHARGE SUMMARY
[facility]   Cardiology II Service / Advanced Heart Failure  Discharge Summary     Mariusz Sharp MRN# [id number]   YOB: 1960 Age: 59 year old     DATE OF ADMISSION:  8/31/2020  DATE OF DISCHARGE: 9/24/2020  ADMITTING PROVIDER: Nasreen Nielson MD  DISCHARGE PROVIDER: Gerardo Stone MD and Dinora Harper DNP, NP-C   PRIMARY PROVIDER:  Milad Fry    ADMIT DIAGNOSES:   1. Fever, diarrhea, cough  2. Syncope in the setting of worsening RADER  3. Status post orthotopic heart transplant and chronic immunosuppression  4. Hypertension  5. Hypomagnesia  6. DM type II  7. Elevated PSA    DISCHARGE DIAGNOSES:   1. Post-obstructive versus aspiration pneumonia  2. RUL/suprahilar mass +abiotrophia defectiva, narrowing of multiple RUL bronchi, resolved  3.  Status post orthotopic heart transplant and chronic immunosuppression  4. ANDREW and CKD, ATN due to tacrolimus and prerenal due to volume depletion  5. Pleuritic chest pain, resolved  6. Hypertension  7. Hyponatremia, hypovolemic, resolved  8. Mild transaminitis, resolved  9. Elevated PSA with suspected chronic prostatitis  10. Diarrhea, non infectious, resolved    Follow-up:  [] tacrolimus level Monday  [] next RHC/biopsy/Immuknown 9/30  [] chest CT due 10/3 (Saturday), RN coordinator to ask Dr Rodriguez if ok to do 10/6 versus 10/2 - prefer at [facility] rather than local  [] follow up BPs next week, may need to add bacl amlodipine  [] follow up final cultures as below    HPI: Please see the detailed H & P by Dougie Mcelroy and Naga from 8/31/2020. 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  Describes a syncopal episode last night. It started with a  "prolonged dry cough, felt weak, and fell on his forehead. It was unwitnessed episode. Denies hitting concrete. Endorses loss of consciousness, lasted for about a minute or two. Denies confused post LOC. Has had similar witnessed syncopal episode in 2002. In the past week, he also reports a non-bloody diarrhea that lasted for 2-3 days, resolved on it own, now his stools are loose. Endorses R otalgia, headaches, sore throat. He reports he had a fever last night, measure 101.3 that subsided with tylenol. Past few weeks, reported dyspnea on exertion, \"I struggled taking the trash out the other day\". Can walk ~50 feet before getting winded. Denies orthopnea, PND or LE edema. Patient was recently treated from CAP on 8/21. CXR at that time showed retrocardiac opacity concerning for CAP. ID workup thus far remarkable for RSV positive for rhinovirus. Treated with Ceftriaxone and Azithromycin. Patient symptomatic improved at time of discharge.    HOSPITAL COURSE:   #Post-obstructive versus aspiration pneumonia  #RUL and right hilum mass with narrowing of multiple RUL bronchi (since 7/29)  #Tissue cx +abiotrophia defectiva  Presented with feve, shortness of breath, and cough as above. Underwent significant workup as below including multiple attempts at biopsy with interventional pulmonology. Initially treated as fungal infection given +fungitell but clinically didn't improve and the RUL mass did not change. Ultimately, tissue culture from RUL mass showed abiotrophia defectiva which is an oral microbe. This was felt to be possible contaminant per Dr. Rodriguez but given it's response to abx (CT improved, clinically improved) we will continue to treat and repeat a chest CT ~10/3 with close f/u with transplant ID. Immunosuppression was decreased as below. Will have repeat Immuknow next week as well as heart biopsy.     Completed Work-up:  - 8/31: Fungitell assay was low-grade positive/stable, corresponding galactomannan antigen assay " negative. COVID negative.  - 9/1: Strep pneumo, CDiff, cryptococcal antigen, toxoplasma, legionella negative. CMV and EBV negative.  - 9/2: PJP PCR of sputum negative. CT Chest new RUL mass concerning for PTLD vs infection, new since chest CT 7/29.  - 9/4: Aspergillus, coccidiodes, histoplasma, blastomycis negative.  - 9/7: BCs negative.  - 9/8: BAL right lung negative for malignancy, GMS stain positive for rare pseudohyphae. Nocardia negative. PJP negative. FNA negative. FNA biopsy negative for malignancy.  - 9/8: Bronchial lavage cultures no growth, fungal DNA 28S negative  - 9/9:  CDiff negative, BCs negative.  - 9/12:  Absolute CD3 618, CD4 359, CD8 114, CD3 Mature T 71, CD4:CD8 ratio 3.15, CD4 Chisholm T 41, CD8 Suppressor T 13.  - 9/13: EBV negative. IgA 108, IgG 510, IgM 29.  Repeat chest CT showed increase in size of RUL mass and post-obstructive PNA.  - 9/14: COVID negative.  - 9/15: Bronchial lavage of right lung negative for malignancy and negative for PJP and fungal orgamisms.  - 9/17: CDiff negative.  RUL culture negative to date, nocardia negative to date.  Leukemia/lymphoma eval negative.  FNA negative for malignancy.  Bronchial lavage of right lung nondiagnostic. Tissue from RUL showing abiotrophia defectiva on day 3.  - 9/17 Actinomyces, nocardia, legionella, KOH negative  - 9/17: Right upper lung biopsy non diagnostic, scant alveolar macrophages.       Pending:  - 9/14: Viral respiratory cultures in process  - 9/15: Bronchial lavage cultures no growth     Treatment course:  - vanc/zosyn 8/31-9/1  - Cefdnir 9/1-9/13  - Posaconazole 9/4-9/13, no beneficial effect on fevers or size of RUL mass despite therapeutic levels  - Atovaquone 9/12-9/15   - Zosyn 9/13-9/22(for presumptive post-obstructive (or aspiration) right lung pneumonia  - on 9/23, started PO cipro 500 mg bid and clindamycin 450 mg tid through 10/11  - XR video swallow and esophagram given concern for aspiration PNA and hiatal hernia - no  dysphagia noted and mild GERD noted, ppi restarted  - per transplant ID, no need to pursue further bx as his CT chest is improving               - if further biopsy indicated, may need VATS  - repeat chest CT 10/2 or 10/6 and f/u with Dr Rodriguez on 10/6     #s/p orthotopic heart transplant 5/18/20 for NICM  Post-operative course c/b NSVT, C Acnes growing from former LVAD site (thought to be a contaminant, treated with IV vanco --> po doxy), donor blood growing S anginosus and Veillonella (also thought to be a contaminant, treated with vanco/zosyn). SELVIN 8/31 LVEF >70% and normal RV size/function. RHC 8/26 RA 3, mPA 15, PCW 10, and CI 2.63.     Graft function:  - BP: 101-156/74-100s, hydralazine 100 mg tid, may need to add back amlodipine  - HR: 110s  - Fluid status: euvolemic     Immunosuppression:  - MMF 500mg bid (decreased 9/8 due to low ImmuKnow)  - tacro goal level 8-10, trough today 6.3, increase to 3 mg bid and recheck Monday locally      Serostatus: CMV D+/R- EBV D-/R+ Toxo D-/R-     PPx:  - CAV: aspirin held due to hematuria on UA and anemia, restart as outpatient, continue crestor 10mg daily  - CMV: valcyte 450mg (renally dosed) through 11/2020  - Osteoporosis: calcium/vitamin D   - GI: pantoprazole 40mg daily restarted      Health maintenance:  - month 4 surveillance due 9/30  - needs baseline cor angio when renal function stable  - needs flu shot when recovered from acute illness     #Pleuritic chest pain, resolved  Worse with coughing. Likely MSK due to frequent coughing with some pleurisy. Relieved with APAP.     #ANDREW on CKD due to hypovolemia and tacrolimus  #Hyponatremia, hypovolemic  PTA Cr 1.3-1.7. UA protein 30, RBC 53. Cr peaked at 3.88, since trended down.  Multiple renal ultrasounds negative for obstructive stones and hydronephrosis. Urine sodium 52 and FeNA 2.5%, suggesting intrinsic renal process. Renal consulted, felt likely ATN due to a combination of mild volume depletion and tacrolimus.  Cr  "1.7 on day of discharge. Monitoring tacro as above.      #Mild transaminitis, resolved  Likely drug induced. ALT 79  at peak. Improving daily, now normal.     #Elevated PSA with suspected chronic prostatitis:  Prostate MRI showed signs of BPH. PSA 10.8 8/2020, 8.8 9/2020. Antibiotic course as above.    #DMII: Continue insulin management per endocrine. Insulin gtt stopped 9/19. Stopped TFs 9/20, NJT pulled 9/21. Endocrine managed DM during admission, discharged on lantus 24 units and ssi as below with 5 units/CHO with meals and snacks.     #Diarrhea, intermittent, resolved:  CDiff and CMV negative on admission, enteric panel negative 9/9, CDiff again negative 9/17. PRN imodium. Resolved prior to admission    #HTN: BP low normal during admission, slowly increased as he improved clinically. Increased hydralazine to 100 mg tid, continue to hold amlodipine for now. May resume as outpatient.     #Non-severe malnutrition in the context of acute illness/injury on chronic illness  Inadequate oral intake related to decreased/poor appetite as evidenced by pt report and eating 0-50% meals per RN flowsheets  On TF intermittently. Nutrition following.     Dinora Harper DNP, NP-C  9/24/2020  Pager: [phone]    PENDING RESULTS:       PHYSICAL EXAM:  Blood pressure (!) 156/109, pulse 103, temperature 98  F (36.7  C), temperature source Oral, resp. rate 18, height 1.626 m (5' 4\"), weight 79.7 kg (175 lb 9.6 oz), SpO2 96 %.    GENERAL: Appears comfortable, in no distress.  HEENT: Eye symmetrical, no discharge or icterus bilaterally. Mucous membranes moist and without lesions.  NECK: Supple, JVD not visible.   CV: Tachycardic and regular, +S1S2, no murmur, rub, or gallop.   RESPIRATORY: Respirations regular, even, and unlabored. Lungs CTA throughout.    GI: Soft, obese and non distended with normoactive bowel sounds present in all quadrants. No tenderness, rebound, guarding.   EXTREMITIES: No peripheral edema. 2+ bilateral " pedal pulses.   NEUROLOGIC: Alert and oriented x 3. No focal deficits.   MUSCULOSKELETAL: No joint swelling or tenderness.   SKIN: No jaundice. No rashes or lesions.     LABS:   Last CBC:   Recent Labs   Lab Test 09/24/20  0451   WBC 3.0*   RBC 2.90*   HGB 8.2*   HCT 26.8*   MCV 92   MCH 28.3   MCHC 30.6*   RDW 15.5*          Last CMP:  Recent Labs   Lab Test 09/24/20  0451      POTASSIUM 5.0   CHLORIDE 109   ARLENE 9.0   CO2 19*   BUN 35*   CR 1.70*   *   AST 30   ALT 46   BILITOTAL 0.3   ALBUMIN 2.3*   PROTTOTAL 6.0*   ALKPHOS 75       IMAGING:  Results for orders placed or performed during the hospital encounter of 08/31/20   XR Chest Port 1 View    Narrative    EXAM: XR CHEST PORT 1 VW  8/31/2020 1:52 PM     HISTORY:  Cough       COMPARISON:  Chest radiograph 8/18/2020    FINDINGS:   Single portable AP supine view of the chest. Postsurgical changes in  the chest. Sternotomy wires are intact. Stable position of catheter  projecting over left lung apex.    Trachea is midline. Cardiomediastinal silhouette is stable. The  pulmonary vasculature is distinct. Bilateral costophrenic angles are  not included in current study. No focal airspace opacity, large  pleural effusion or appreciable pneumothorax.    No acute osseous abnormality. Visualized upper abdomen is  unremarkable.        Impression    IMPRESSION: No acute airspace disease.     I have personally reviewed the examination and initial interpretation  and I agree with the findings.    ALICIA CEJA MD   Head CT w/o contrast    Narrative    CT HEAD W/O CONTRAST 8/31/2020 2:43 PM    History: Head trauma, minor, GCS>=13, low clinical risk, initial exam     Comparison: 1/17/2019    Technique: Using multidetector thin collimation helical acquisition  technique, axial, coronal and sagittal CT images from the skull base  to the vertex were obtained without intravenous contrast.    Findings: There is no intracranial hemorrhage, mass effect, or  midline  shift. Gray/white matter differentiation in both cerebral hemispheres  is preserved. Mild cerebral volume loss. Ventricles are proportionate  to the cerebral sulci. The basal cisterns are clear. Atherosclerotic  calcification at the carotid siphons.    The bony calvaria and the bones of the skull base are normal. The  visualized portions of the paranasal sinuses and mastoid air cells are  clear.       Impression    Impression:  No acute intracranial pathology.     I have personally reviewed the examination and initial interpretation  and I agree with the findings.    EBONI REDMAN MD   US Renal Complete    Narrative    EXAMINATION: US RENAL COMPLETE, 8/31/2020 3:55 PM     COMPARISON: CT AP 6/2/2020    HISTORY: Evaluation of kidney stone    TECHNIQUE: The kidneys and bladder were scanned in the standard  fashion with specialized ultrasound transducer(s) using both gray  scale and limited color/spectral Doppler techniques.    FINDINGS:    Right kidney: Measures 11.7 cm in length. Parenchyma is of normal  thickness and echogenicity. No focal mass. No hydronephrosis.  Echogenic focus measuring 0.5 x 0.3 cm in the upper pole without  associated posterior acoustic shadowing.    Left kidney: Measures 13.5 cm in length. Parenchyma is of normal  thickness and echogenicity. No focal mass. No hydronephrosis. There is  an anechoic cyst measuring 1.4 x 1.25 x 1.27 cm. 4 mm echogenic focus  without posterior acoustic shadowing in the midpole.    Bladder: Unremarkable.      Impression    IMPRESSION:  1. Small echogenic foci in the upper pole of the right kidney and  midpole the left kidney without posterior acoustic shadowing and no  correlate on 6/2/2020 CT are unlikely to represent stones, potentially  representing invagination of perinephric fat and extension of renal  sinus fat, respectively, or other reflective interfaces of doubtful  clinical significance. No appreciable urinary tract stone. The large  left  renal pelvis stone previously seen on 6/2/2020 CT is not  visualized.  2. No hydronephrosis.    I have personally reviewed the examination and initial interpretation  and I agree with the findings.    MEGAN MENARD, DO   XR Chest Port 1 View    Narrative    EXAM: XR CHEST PORT 1 VW  9/1/2020 9:39 AM     HISTORY:  sob       COMPARISON:  Chest radiograph 8/31/2020    FINDINGS:   Single portable AP view of the chest at 50 degrees. Postsurgical  changes in the chest with intact sternotomy wires. Stable position of  catheter projecting over left lung apex.    Trachea is midline. Cardiomediastinal silhouette is stable. The  pulmonary vasculature is distinct. No pleural effusion or appreciable  pneumothorax. Right superior hilar opacity.    No acute osseous abnormality. Visualized upper abdomen is  unremarkable.        Impression    IMPRESSION:  Right superior hilar opacity, indeterminate. Possibly  infection, adenopathy, or other. PA and lateral chest xray or CT.    I have personally reviewed the examination and initial interpretation  and I agree with the findings.    MAGGIE FONTENOT MD   CT Chest w/o Contrast    Narrative    EXAMINATION: CT CHEST W/O CONTRAST, 9/2/2020 4:23 PM    TECHNIQUE:  Helical CT images from the thoracic inlet through the lung  bases were obtained without IV contrast.     COMPARISON: CT chest 7/29/2020, CTA 1/30/2019, and CT 7/22/2018.     HISTORY: Cough, persistent    FINDINGS:    Chest:   Postsurgical changes of heart transplant with unremarkable appearance  of the allograft and unchanged mild soft tissue density thickening  along anterior pericardium. Intact median sternotomy wires. Great  vessels are grossly normal in caliber. Blood pool is hypoattenuating  the myocardium. Central tracheobronchial tree is patent.    New spiculated right upper lobe mass vs consolidation along the  superior right major fissure with adjacent groundglass nodularity  peripherally. There is new right hilar mass vs  lymphadenopathy with  narrowing of multiple right upper lobe subsegmental bronchi. Unchanged  smaller nodules in the anterior right upper lobe (series 6 image 100).  Remaining lungs are clear. No effusion or pneumothorax. Scattered  calcified granulomas are unchanged.    Abdomen:   Examination of the upper abdomen is limited. Extensive cholelithiasis  without CT evidence for cholecystitis.    Bones/soft tissues:   No suspicious osseous lesion or acute abnormality. Mild degenerative  changes of the thoracic spine.        Impression    IMPRESSION:     1. New mass vs consolidation in the right upper lobe and right hilum  since 7/29/2020 with narrowing of multiple right upper lobe bronchi.   1a. Differential pneumonia with reactive right hilar lymphadenopathy  vs. malignancy(ex. Lymphoma/PTLD) with post obstructive pneumonia.    2. Stable additional pulmonary lesions as above.    3. Stable postsurgical changes of heart transplant.    4. Cholelithiasis without evidence of cholecystitis.    I have personally reviewed the examination and initial interpretation  and I agree with the findings.    FAWAD STEWART MD   XR Chest Port 1 View    Narrative    Exam: XR CHEST PORT 1 VW, 9/7/2020 5:30 AM    Indication: pleuritic chest pain    Comparison: CT chest 9/2/2020, chest x-ray 9/1/2020    Findings:   Single portable view of the chest. Less prominent right superior hilar  opacity, better delineated on CT 9/2/2020. Cardiomediastinum and upper  abdomen are unchanged. No pneumothorax. No pleural effusion.      Impression    Impression:   1. No acute cardiopulmonary findings.  2. Less prominent right superior hilar opacity.    I have personally reviewed the examination and initial interpretation  and I agree with the findings.    ALLYSON OSORIO MD   XR Feeding Tube Placement    Narrative    FEEDING TUBE PLACEMENT 9/14/2020 11:43 AM    INDICATION: Nutritional needs    COMPARISON: None.    FLUOROSCOPY TIME: 1.6  mins.    FINDINGS: The feeding tube was advanced under fluoroscopic guidance  with final position of tip in the fourth portion of the duodenum. A  small amount of barium was injected to demonstrate placement within  the small bowel. The tube was flushed with sterile water and secured  via nasal bridle. There were no complications of the procedure.      Impression    IMPRESSION: Uncomplicated feeding tube placement with tip in the  fourth portion of the duodenum.    I, JHOANA KHAN MD, attest that I was present for all critical  portions of the procedure and was immediately available to provide  guidance and assistance during the remainder of the procedure.    I have personally reviewed the examination and initial interpretation  and I agree with the findings.    JHOANA KHAN MD   CT Chest w/o Contrast    Narrative    EXAMINATION: CT CHEST W/O CONTRAST, 9/13/2020 9:29 AM    CLINICAL HISTORY: Cough, persistent fever    COMPARISON: Chest x-ray 9/11/2020, chest CT 9/2/2020.    TECHNIQUE: CT imaging obtained through the chest without contrast.  Coronal and axial MIP reformatted images obtained.     CONTRAST:  none.    FINDINGS: Cardiac transplantation. Normal heart size, trace  pericardial effusion. Normal caliber thoracic aorta and pulmonary  trunk. Normal branching of the thoracic great vessels. Normal thyroid.  No suspicious lower cervical or axillary lymphadenopathy. Prominent  right hilar lymphadenopathy. Fluid-filled, distended esophagus with  small hiatal hernia.    Dependent secretions in the trachea. Small right pleural effusion with  associated compressive atelectasis. The spiculated right hilar mass  has enlarged and currently measures 3.6 x 5.4 cm. There is associated  compression and narrowing upon the right upper lobe subsegmental  bronchioles. There is interlobular septal thickening of the right  upper and lower lobes with increased right basilar and right upper  lobe tree-in-bud nodular  opacities. Scattered right basilar  groundglass opacities noted. Faint left lower lobe groundglass  opacities also seen. Multiple scattered pulmonary nodules measuring up  to 5 mm are also noted (for example series 6, image 89 on the right).     Evaluation of the upper abdomen is limited. Cholelithiasis without  cholecystitis. Mild diffuse hepatic hypoattenuation.    BONES: Sternotomy incision with persistent disunion. Wires are intact.  No acute or suspicious osseous abnormality.      Impression    IMPRESSION:  1. Increased size of the right hilar mass/consolidation with  associated narrowing of the subsegmental right upper lobe bronchioles  and multiple scattered pulmonary nodules as above. Differential  continues to include infection versus malignancy with postobstructive  pneumonia.  2. Increased bibasilar groundglass and right lung tree-in-bud nodular  opacities suggestive of infection, possibly secondary to aspiration.  Small right pleural effusion.  3. Surgical changes of cardiac transplantation.  4. Cholelithiasis without cholecystitis.    I have personally reviewed the examination and initial interpretation  and I agree with the findings.    ANA JOY MD   US Renal Complete    Narrative    EXAMINATION: US RENAL COMPLETE, 9/15/2020 5:30 PM     COMPARISON: 8/31/2020    HISTORY: Rising creatinine.    TECHNIQUE: The kidneys and bladder were scanned in the standard  fashion with specialized ultrasound transducer(s) using both gray  scale and limited color/spectral Doppler techniques.    FINDINGS:    Right kidney: Measures 11.2 cm in length. Parenchyma is of normal  thickness and echogenicity. 7 mm nonobstructing right renal calculus.  No hydronephrosis.    Left kidney: Measures  10.5   cm in length. Parenchyma is of normal  thickness and echogenicity. 1.5 x 1.1 x 1.2 cm left renal cortical  cyst, grossly unchanged from previous. No focal mass. No  hydronephrosis.     Bladder: Unremarkable.      Impression     IMPRESSION:  Re-demonstrated nonobstructing right renal calculus. Otherwise  unremarkable appearance of the kidneys. No hydronephrosis.    I have personally reviewed the examination and initial interpretation  and I agree with the findings.    HIPOLITO DEL ANGEL MD   XR Chest Port 1 View    Narrative    Exam: XR CHEST PORT 1 VW, 9/17/2020 3:24 PM    Indication: Evaluate for pneumothorax after lung biopsy    Comparison: 9/13/2020    Findings:   Feeding tube is seen coursing through the mediastinum. Tip projects  off the film. Heart and pulmonary vasculature within normal limits.  Patchy opacity right upper lobe and over the right lower lobe. No  pneumothorax is identified. Catheter projecting over the left chest.  Likely retained piece of catheter.      Impression    Impression:   New opacities in the right upper and right lower lung suggesting areas  of atelectasis or hemorrhage, postbiopsy. No pneumothorax.    CALEB SHETTY MD   CT Chest w/o Contrast    Narrative    CT CHEST W/O CONTRAST 9/21/2020 5:10 PM    History: RUL mass, follow up size    Comparison: CT chest 9/13/2020    Technique: CT of the chest was obtained without intravenous contrast.  Axial, coronal, and sagittal reconstructions were obtained and  reviewed.    Contrast: None    Findings:     Lungs: Moderate decrease in size in right suprahilar mass measuring  4.5 x 3.2 cm in the axial dimension, previously 5.5 x 4.2 cm. This  crosses the right major fissure and also likely invades the right  upper lobe bronchi. Moderate improvement in bronchocentric mixed  groundglass and nodular opacities in the right lower lobe. Previous  intralobular septal thickening has nearly completely resolved. Trace  right-sided pleural effusion which has decreased in size. Other  scattered tiny solid pulmonary nodules are stable. No pneumothorax.  Airways: Central tracheobronchial tree is clear. Previously occluded  right upper lobe bronchi are now patent, although still  narrowed.  Vessels: Main pulmonary artery and aorta are normal in caliber. Normal  three-vessel arch. Mild calcification of the ascending aorta.  Heart: Heart size is normal without pericardial effusion.  Postoperative changes of CABG.  Lymph nodes: Prominence to the right hilum, although previous  borderline enlarged right hilar and mediastinal lymph nodes have  decreased in size. For instance, right subcarinal lymph node measuring  7 mm on series 2, image 31 previously measured 1.1 cm. Improvement in  soft tissue density in the posterior mediastinum at the level of the  distal trachea.  Thyroid: Within normal limits.  Esophagus: Within normal limits    Upper abdomen: Gallstones without adjacent inflammatory change.  Remainder of the upper abdominal viscera are within normal limits.    Bones and soft tissues: No suspicious axillary lymphadenopathy or soft  tissue mass. No suspicious osseous lesion.      Impression    Impression: Positive treatment response as evidenced by:  1. Moderate decrease in size of right suprahilar mass. This mass has  aggressive features including mediastinal and bronchial invasion as  well as mediastinal lymphadenopathy. These invasive features have  improved.  2. Previously occluded right upper lobe bronchi are now patent,  although remains mildly narrowed.  3. Improvement and bronchocentric groundglass and nodular opacities in  the right lower lobe favored to be infectious.  4. Near complete resolution of right-sided pleural effusion with  resolution of intralobular septal thickening in the right lower lobe.    I have personally reviewed the examination and initial interpretation  and I agree with the findings.    MAGGIE FONTENOT MD   XR Video Swallow with SLP or OT    Narrative    Examination:  Modified Barium Swallow Study with Speech Pathology    Comparison: None.    History: 59-year-old male admitted with right upper lobe pneumonia,  concern for aspiration pneumonia.    Fluoroscopy  time: 0.5 minutes.    Findings: Under fluoroscopic guidance, the patient was given orally  administered barium of varying consistencies in the presence of the  speech pathology service.     The oral phase was normal. There is no delay in swallowing or pooling  of contrast. There is no penetration or aspiration with any  consistency of barium. No penetration or aspiration of pudding or  pudding-coated crackers. No significant residuals are present.      Impression    Impression:  1. No aspiration or penetration of any administered consistency.   2. Please see the speech pathologist report for further details.    NANCY RICHARDS MD, attest that I was immediately available to  provide guidance and assistance during the entirety of the procedure.    I have personally reviewed the examination and initial interpretation  and I agree with the findings.    NANCY SAEED MD   XR Esophagram    Narrative    Double Contrast Esophagram dated 9/23/2020  Fluoroscopy time was 0.7 minutes.    COMPARISON:    CT chest abdomen 9/21/20.    HISTORY:    59-year-old male, admitted for right upper lobe  consolidation/mass, with concern for aspiration pneumonia. Endorsing  aspiration of liquids and solids, as well as sensation of food getting  stuck in his throat.    FINDINGS: Examination of the esophagus reveals adequate primary and  secondary peristalsis. There were no focal strictures, masses, or  mucosal abnormalities. The gastroesophageal junction is patent. No  hiatal hernia was seen on examination. Mild gastroesophageal reflux to  the lower 1/3rd of the esophagus was noted towards the end of the  study.    1.      Impression    IMPRESSION:   2.  Mild gastroesophageal reflux.  3.  No esophageal stricture or mass.    I have personally reviewed the examination and initial interpretation  and I agree with the findings. NANYC RICHARDS MD, attest that I was  present for all critical portions of the procedure and was  immediately  available to provide guidance and assistance during the remainder of  the procedure.    NANCY SAEED MD   Echocardiogram Limited    Narrative    [id number]  [id number]  [id number]  [id number]^SIM^SONAL^CHIDI           [facility]  Echocardiography [facility]  [address] [place] [address]     Name: ADILSON RINCON  MRN: [id number]  : 1960  Study Date: 2020 03:15 PM  Age: 59 yrs  Gender: Male  Patient [facility]: [facility]  Reason For Study: S/P Heart tranplant, SOB  Ordering Physician: SONAL MONTEMAYOR  Referring Physician: SEVEN SIERRA  Performed By: Nahid Siu RDCS     BSA: 1.9 m2  Height: 64 in  Weight: 180 lb  HR: 114  BP: 109/79 mmHg  _____________________________________________________________________________  __        Procedure  Limited Portable Echo Adult.  _____________________________________________________________________________  __        Interpretation Summary  Global and regional left ventricular function is hyperkinetic with an EF >70%.  Right ventricular function, chamber size, wall motion, and thickness are  normal.  Small collapsed IVC.  Trivial pericardial effusion is present.  _____________________________________________________________________________  __        Left Ventricle  Global and regional left ventricular function is hyperkinetic with an EF >70%.     Right Ventricle  Right ventricular function, chamber size, wall motion, and thickness are  normal.     Vessels  Small collapsed IVC.     Pericardium  Trivial pericardial effusion is present.     _____________________________________________________________________________  __        Doppler Measurements & Calculations     PA acc time: 0.11 sec     _____________________________________________________________________________  __           Report approved by: Dayna Rivera 2020 03:47 PM          PROCEDURES:  Bronchoscopy 9/15    CONSULTATIONS:    Interventional pulmonology  Transplant infectious disease  Endocrine diabetes   Nephrology     DISCHARGE MEDICATIONS:  Discharge Medication List as of 9/24/2020 10:51 AM      START taking these medications    Details   pantoprazole (PROTONIX) 40 MG EC tablet Take 1 tablet (40 mg) by mouth every morning (before breakfast), Historical         CONTINUE these medications which have CHANGED    Details   ciprofloxacin (CIPRO) 500 MG tablet Take 1 tablet (500 mg) by mouth every 12 hours Until 10/11 or as directed by transplant infectious disease, Disp-60 tablet,R-0, E-Prescribe      clindamycin (CLEOCIN) 150 MG capsule Take 3 capsules (450 mg) by mouth every 8 hours Until 10/11 or as directed by transplant infectious disease, Disp-90 capsule,R-0, E-Prescribe      hydrALAZINE (APRESOLINE) 25 MG tablet Take 4 tablets (100 mg) by mouth 3 times daily, Disp-270 tablet,R-3, E-Prescribe      !! insulin aspart (NOVOLOG PEN) 100 UNIT/ML pen 1 unit / 5  gms CHO with meals,plus correction insulin., Disp-3 mL,R-3, E-Prescribe      !! insulin aspart (NOVOLOG PEN) 100 UNIT/ML pen Inject 1-11 Units Subcutaneous At Bedtime Inject 1-11 units at bedtime per custom scale   For Bedtime BG less than 200, no additional insulin needed   to 219 give 1 units.    to 239 give 2 units.    to 259 give 3 units.    to 279  give 4 units.    to 299 give 5 units.    to 319 give 6 units.    to 339 give 7 units.    to 359 give 8 units    to 379 give 9 units.   to 399 give 10 units.  BG >/=400 give 11 units., Disp-3 mL,R-0, Historical      !! insulin aspart (NOVOLOG PEN) 100 UNIT/ML pen Inject 1-14 units per custom scale  For Pre-Meal BG less than 140, no additional insulin needed   to 159 give 1 units.    to 179 give 2 units.    to 199 give 3 units.    to 219 give 4 units.    to 239 give 5 units.     to 259 give 6 units.    to 279 give 7 units.    to  299 give 8 units.    to 319 give 9 units.    to 339 give 10 units.    to 359 give 11 units.    to 379 give 12 units.   to 399 give 13 units.  BG >/=400 give 14 u nits., Disp-3 mL,R-0, Historical      loperamide (IMODIUM) 2 MG capsule Take 1 capsule (2 mg) by mouth 4 times daily as needed for diarrhea, Disp-60 capsule,R-0, E-Prescribe      multivitamin w/minerals (THERA-VIT-M) tablet Take 1 tablet by mouth daily, Disp-90 tablet,R-3, E-Prescribe      mycophenolate (GENERIC EQUIVALENT) 500 MG tablet Take 1 tablet (500 mg) by mouth 2 times daily, Disp-180 tablet,R-11, Historical      tacrolimus (GENERIC EQUIVALENT) 1 MG capsule Take 3 mg twice a day or as directed by transplant clinic, Disp-180 capsule,R-11, E-Prescribe      insulin glargine (LANTUS PEN) 100 UNIT/ML pen Inject 24 Units Subcutaneous At Bedtime Inject 32 units subcutaneous daily., Disp-15 mL,R-3, HistoricalIf Lantus is not covered by insurance, may substitute Basaglar at same dose and frequency.         !! - Potential duplicate medications found. Please discuss with provider.      CONTINUE these medications which have NOT CHANGED    Details   ACCU-CHEK [place]TE PLUS test strip Check BG 3 times daily at meals and at bedtime., Disp-300 strip,R-0,SILVIO, E-Prescribe      acetaminophen (TYLENOL) 325 MG tablet Take 2 tablets (650 mg) by mouth every 4 hours as needed for other (multimodal surgical pain management along with NSAIDS and opioid medication as indicated based on pain control and physical function.), OTC      BD SILVANA U/F 32G X 4 MM insulin pen needle Use 3-4 daily.Disp-250 each,R-4,DAWE-Prescribe      calcium carbonate 600 mg-vitamin D 400 units (CALTRATE) 600-400 MG-UNIT per tablet Take 1 tablet by mouth 2 times daily (with meals), OTC      Continuous Blood Gluc  (DEXCOM G6 ) DON 1 each daily, Disp-1 Device,R-1, E-Prescribe      Continuous Blood Gluc Sensor (DEXCOM G6 SENSOR) MISC 1 each every 10 days, Disp-9  each,R-3, E-Prescribe      Continuous Blood Gluc Transmit (DEXCOM G6 TRANSMITTER) MISC 1 each every 3 months, Disp-1 each,R-3, E-Prescribe      magnesium oxide (MAG-OX) 400 (241.3 Mg) MG tablet Take 1 tablet (400 mg) by mouth 2 times daily, Disp-90 tablet,R-3, E-Prescribe      rosuvastatin (CRESTOR) 10 MG tablet TAKE ONE TABLET BY MOUTH ONCE DAILY, Disp-90 tablet,R-3, E-Prescribe      valGANciclovir (VALCYTE) 450 MG tablet TAKE ONE TABLET (450MG) BY MOUTH DAILY, Disp-30 tablet,R-3, E-Prescribe         STOP taking these medications       amLODIPine (NORVASC) 5 MG tablet Comments:   Reason for Stopping:         aspirin (ASA) 81 MG chewable tablet Comments:   Reason for Stopping:         cefdinir (OMNICEF) 300 MG capsule Comments:   Reason for Stopping:               DISCHARGE DISPOSITION: Mariusz Sharp will discharge to home in stable condition.     DISCHARGE INSTRUCTIONS:  Discharge Procedure Orders   CT Chest w/o contrast   Standing Status: Future Standing Exp. Date: 12/23/20     Order Specific Question Answer Comments   Clinical Info for the Interpreting Provider f/u RUL mass, compare all findings to previous CT on 9/21    Priority Routine      Medication Therapy Management Referral   Referral Priority: Routine Referral Type: Med Therapy Management   Requested Specialty: Pharmacist   Number of Visits Requested: 1     Cardiac Rehab Referral   Standing Status: Future   Referral Priority: Routine Referral Type: Rehab Therapy Cardiac Therapy   Number of Visits Requested: 1     Reason for your hospital stay   Order Comments: Lung infection, need for adjustments to immunosuppression, deconditioning     Adult [facility] Follow-up and recommended labs and tests   Order Comments: Follow up with Dr. Rodriguez on 10/6 virtually and in heart transplant clinic as schduled    Appointments on [place] and/or [facility] (with [facility] or [facility] provider or service). Call [phone] if you haven't heard regarding these appointments  within 7 days of discharge.     Activity   Order Comments: Your activity upon discharge: activity as tolerated     Order Specific Question Answer Comments   Is discharge order? Yes      When to contact your care team   Order Comments: Call transplant coordinator if you have any of the following: temperature greater than 100, chills, cough,  increased shortness of breath, increased swelling, increased pain, weight gain, decreased appetite, diarrhea, nausea, vomiting.     Full Code     Order Specific Question Answer Comments   Code status determined by: Discussion with patient/ legal decision maker      Diet   Order Comments: Follow this diet upon discharge: Orders Placed This Encounter      Snacks/Supplements Adult: Boost Breeze; With Meals      Consistent Carbohydrate Diet 8659-2737 Calories: Moderate Consistent CHO (4-6 CHO units/meal)     Order Specific Question Answer Comments   Is discharge order? Yes        45 minutes spent in discharge, including >50% in counseling and coordination of care, medication review and plan of care recommended on follow up. Questions were answered, patient agrees to plan.

## 2020-09-24 NOTE — PROGRESS NOTES
"IP Diabetes Management  Daily Note           Assessment and Plan:   HPI: Mariusz Sharp \"Red\" is a 59 year old male with history of CAD, LV thrombus, CKD Stage III, PAF, DM Type II, and ICM s/p HM III LVAD as BTT (2018) with subsequent OHT 5/18/20, presenting with progressive SOB, cough, and fever, found to have right hilar lung mass with infiltrate. S/P bronch with biopsy 9/15. Poor PO intake, requiring temporary initiation of enteral feeds, now discontinued.     Assessment:   1)Type II Diabetes Mellitus,  2) Enteral feed-induced hyperglycemia - resolved, now off TF    Reccs for discharge: plan discussed with patient and placed in AVS   -Lantus 24 units daily bedtime.    -Novolog 1 unit per 5g CHO with meals, snacks   -Novolog 1/20 (custom intensity) sliding scale before meals and at bedtime:    Mealtime Scale  BG less than 140, none   to 159 = 1 unit   to 179 = 2    to 199 = 3   to 219 = 4    to 239 = 5    to 259 = 6    to 279 = 7    to 299 = 8    to 319 = 9    to 339 = 10    to 359 = 11    to 379 = 12    to 399 = 13   BG over 400 = 14     Bedtime Scale  BG less than 200, none   to 219 = 1 unit   to 239 = 2    to 259 = 3    to 279 = 4     to 299 = 5     to 319 = 6    to 339 = 7   to 359 = 8     to 379 = 9    to 399 = 10   BG over 400 = 11     Outpatient follow up: with [facility] within 1-2 weeks; sent our clinic a staff message 9/24/20 to schedule this follow up.     Plan discussed with patient, paged primary team.    Interval History and Assessment: interval glucose trend reviewed:   BG win good control during the daytime, was NPO for video swallow study.   BG rising to 220's following resumption of PO intake, with lower dose of Lantus the night before.   Fasting glucose 107>128 this morning.     He is excited to be going home. Was agreeable to telephone follow up " in our clinic. Reports he does not need any supplies or refills of insulin for discharge.     Current nutritional intake and type: Orders Placed This Encounter      Consistent Carbohydrate Diet [id number] Calories: Moderate Consistent CHO (4-6 CHO units/meal)      Diet    PTA Diabetes Regimen:   Dexcom G6 CGM  Lantus 32 units daily HS  Novolog 1:8g CHO and high intensity sliding scale AC/HS.    Discharge Planning: discharge home to [place] today.           Diabetes History:   Type of Diabetes: Type 2 Diabetes Mellitus  Lab Results   Component Value Date    A1C 7.4 08/19/2020    A1C 7.2 08/13/2020    A1C 7.8 06/15/2020    A1C 8.0 05/12/2020    A1C 9.5 07/05/2019              Review of Systems:   The Review of Systems is negative other than noted in the Interval History.           Medications:     Current Facility-Administered Medications   Medication     acetaminophen (TYLENOL) tablet 975 mg     albuterol (PROVENTIL) neb solution 2.5 mg     benzocaine-menthol (CEPACOL) 15-3.6 MG lozenge 1 lozenge     calcium carbonate 600 mg-vitamin D 400 units (CALTRATE) per tablet 1 tablet     ciprofloxacin (CIPRO) tablet 500 mg     clindamycin (CLEOCIN) capsule 450 mg     glucose gel 15-30 g    Or     dextrose 50 % injection 25-50 mL    Or     glucagon injection 1 mg     hydrALAZINE (APRESOLINE) injection 10 mg     hydrALAZINE (APRESOLINE) tablet 100 mg     insulin aspart (NovoLOG) injection (RAPID ACTING)     insulin aspart (NovoLOG) injection (RAPID ACTING)     insulin aspart (NovoLOG) injection (RAPID ACTING)     insulin aspart (NovoLOG) injection (RAPID ACTING)     insulin glargine (LANTUS PEN) injection 24 Units     Lidocaine (LIDOCARE) 4 % Patch 1 patch    And     lidocaine patch in PLACE     lidocaine (LMX4) cream     lidocaine 1 % 0.1-1 mL     loperamide (IMODIUM) capsule 2 mg     magnesium sulfate 2 g in water intermittent infusion     magnesium sulfate 4 g in 100 mL sterile water (premade)     melatonin tablet 3 mg      "multivitamin w/minerals (THERA-VIT-M) tablet 1 tablet     mycophenolate (GENERIC EQUIVALENT) tablet 500 mg     naloxone (NARCAN) injection 0.1-0.4 mg     ondansetron (ZOFRAN) tablet 4 mg     oxyCODONE (ROXICODONE) tablet 5 mg     pantoprazole (PROTONIX) EC tablet 40 mg     Patient is already receiving mechanical prophylaxis     potassium chloride (KLOR-CON) Packet 20-40 mEq     potassium chloride 10 mEq in 100 mL intermittent infusion with 10 mg lidocaine     potassium chloride 10 mEq in 100 mL sterile water intermittent infusion (premix)     potassium chloride 20 mEq in 50 mL intermittent infusion     potassium chloride ER (KLOR-CON M) CR tablet 20-40 mEq     prochlorperazine (COMPAZINE) injection 5 mg     rosuvastatin (CRESTOR) tablet 10 mg     sodium chloride (NEBUSAL) 3 % neb solution 3 mL     sodium chloride (PF) 0.9% PF flush 3 mL     sodium chloride (PF) 0.9% PF flush 3 mL     sodium phosphate 10 mmol in D5W intermittent infusion     sodium phosphate 15 mmol in D5W intermittent infusion     sodium phosphate 20 mmol in D5W intermittent infusion     sodium phosphate 25 mmol in D5W intermittent infusion     tacrolimus (GENERIC EQUIVALENT) capsule 2 mg     tacrolimus (GENERIC EQUIVALENT) capsule 3 mg     valGANciclovir (VALCYTE) tablet 450 mg            Physical Exam:    /74   Pulse 116   Temp 97.8  F (36.6  C) (Oral)   Resp 16   Ht 1.626 m (5' 4\")   Wt 79.7 kg (175 lb 9.6 oz)   SpO2 97%   BMI 30.14 kg/m    General: pleasant, in no distress, sitting up in chair.  HEENT: normocephalic, atraumatic. Oral mucous membranes moist.   Lungs: unlabored respiration, + dry cough today.  ABD: rounded, soft, no lipodystrophy noted  Skin: warm and dry, no obvious lesions  MSK:  moves all extremities  Lymp:  no LE edema   Mental status:  alert, oriented to self, place, time  Psych:  affect, calm and appropriate interaction             Data:     Recent Labs   Lab 09/24/20  0745 09/24/20  0451 09/23/20  2138 " 09/23/20  1843 09/23/20  1519 09/23/20  1409 09/23/20  1112  09/23/20  0531  09/22/20  0530  09/21/20  0527  09/20/20  0605  09/19/20  0513   GLC  --  107*  --   --   --   --   --   --  180*  --  160*  --  160*  --  336*  --  171*   *  --  227* 227* 208* 218* 194*   < >  --    < >  --    < >  --    < >  --    < >  --     < > = values in this interval not displayed.     Lab Results   Component Value Date    WBC 3.0 (L) 09/24/2020    WBC 3.7 (L) 09/23/2020    WBC 3.3 (L) 09/21/2020    HGB 8.2 (L) 09/24/2020    HGB 9.1 (L) 09/23/2020    HGB 8.1 (L) 09/21/2020    HCT 26.8 (L) 09/24/2020    HCT 30.1 (L) 09/23/2020    HCT 26.7 (L) 09/21/2020    MCV 92 09/24/2020    MCV 92 09/23/2020    MCV 91 09/21/2020     09/24/2020     09/23/2020     09/21/2020     Lab Results   Component Value Date     09/24/2020     09/23/2020     09/22/2020    POTASSIUM 5.0 09/24/2020    POTASSIUM 5.0 09/23/2020    POTASSIUM 4.9 09/22/2020    CHLORIDE 109 09/24/2020    CHLORIDE 107 09/23/2020    CHLORIDE 108 09/22/2020    CO2 19 (L) 09/24/2020    CO2 20 09/23/2020    CO2 20 09/22/2020     (H) 09/24/2020     (H) 09/23/2020     (H) 09/22/2020     Lab Results   Component Value Date    BUN 35 (H) 09/24/2020    BUN 35 (H) 09/23/2020    BUN 33 (H) 09/22/2020     Lab Results   Component Value Date    TSH 3.94 06/26/2019    TSH 6.91 (H) 07/23/2018     Lab Results   Component Value Date    AST 30 09/24/2020    AST 38 09/22/2020    AST 52 (H) 09/21/2020    ALT 46 09/24/2020    ALT 69 09/22/2020    ALT 82 (H) 09/21/2020    ALKPHOS 75 09/24/2020    ALKPHOS 84 09/22/2020    ALKPHOS 84 09/21/2020       Diabetes Management Team job code: [id number]  I spent a total of 35 minutes bedside and on the inpatient unit managing glycemic care. Over 50% of my time on the unit was spent counseling the patient and/or coordinating care regarding acute hyperglycemia management.  See note for details.    Juana  CACHORRO Ly  Inpatient Diabetes Management Service  Pager [phone]

## 2020-09-24 NOTE — TELEPHONE ENCOUNTER
Please call Red to schedule a post hospitalization diabetes follow up, with me on Monday 9/28, or with another available provider within two weeks.     Juana Ly PA-C  Diabetes Management Service  Pager [phone]

## 2020-09-24 NOTE — PLAN OF CARE
ICU End of Shift Summary. See flowsheets for vital signs and detailed assessment.    Changes this shift: VSS on room air, tachycardic this shift, up ad domingo, very pleasant. +1 edema in lower extremities, tingling in feet.    Plan:  Tacro level in AM.  Plan to discharge to home in AM.

## 2020-09-24 NOTE — PLAN OF CARE
DISCHARGE    Discharged to: Home  Via: Automobile  Accompanied by: Family  Discharge Instructions: Diet, activity, medications, follow up appointments, when to call the MD, and what to watchout for (i.e. s/s of infection, increasing SOB, palpitations, chest pain,)  Prescriptions: To be filled by discharge and home pharmacy per pt's request; medication list reviewed & sent with pt  Follow Up Appointments: arranged; information given  Belongings: All sent with pt  IV: Out  Telemetry: Off, box turned in  Pt exhibits understanding of above discharge instructions; all questions answered  Discharge Paperwork: Signed

## 2020-09-25 ENCOUNTER — TELEPHONE (OUTPATIENT)
Dept: TRANSPLANT | Facility: CLINIC | Age: 60
End: 2020-09-25

## 2020-09-25 DIAGNOSIS — Z11.59 ENCOUNTER FOR SCREENING FOR OTHER VIRAL DISEASES: Primary | ICD-10-CM

## 2020-09-25 DIAGNOSIS — Z94.1 HEART REPLACED BY TRANSPLANT (H): Primary | ICD-10-CM

## 2020-09-25 NOTE — PLAN OF CARE
Occupational Therapy Discharge Summary    Reason for therapy discharge:    Discharged to home.    Progress towards therapy goal(s). See goals on Care Plan in [facility] electronic health record for goal details.  Goals partially met.  Barriers to achieving goals:   discharge from facility.    Therapy recommendation(s):    Continued therapy is recommended.  Rationale/Recommendations:  to increase activity tolerance and exercise endurance.

## 2020-09-25 NOTE — TELEPHONE ENCOUNTER
CLINIC COORDINATOR SCHEDULING NOTES    CALL RESULT: LVM    APPT TYPE: VIRTUAL VISIT RETURN    PROVIDER: Malachi WARNER APPT NEEDED: within 2 weeks    ADDITIONAL NOTES: hospital follow-up

## 2020-09-25 NOTE — TELEPHONE ENCOUNTER
Called pt to follow up after hospital discharge.  Pt is feeling OK at home, still tired, but breathing is better.  Discussed clinic appt/biopsy times for next week as well as need for repeat chest CT next Friday.  Pt is willing to drive to [facility] for CT.  Pt also wondering if he should take his Valcyte daily of or every other day.  Message sent to Dinora Harper NP.  Will f/u with pt regarding medication and CT plans.  Pt states understanding.

## 2020-09-28 ENCOUNTER — TELEPHONE (OUTPATIENT)
Dept: CARDIOLOGY | Facility: CLINIC | Age: 60
End: 2020-09-28

## 2020-09-28 DIAGNOSIS — Z94.1 HEART REPLACED BY TRANSPLANT (H): Primary | ICD-10-CM

## 2020-09-28 LAB
SPECIMEN SOURCE: NORMAL
VIRUS SPEC CULT: NORMAL
VIRUS SPEC CULT: NORMAL

## 2020-09-28 RX ORDER — AMLODIPINE BESYLATE 5 MG/1
5 TABLET ORAL DAILY
Qty: 90 TABLET | Refills: 3 | Status: SHIPPED | OUTPATIENT
Start: 2020-09-28 | End: 2021-11-16

## 2020-09-28 RX ORDER — PANTOPRAZOLE SODIUM 40 MG/1
40 TABLET, DELAYED RELEASE ORAL
Qty: 90 TABLET | Refills: 3 | Status: SHIPPED | OUTPATIENT
Start: 2020-09-28 | End: 2021-11-23

## 2020-09-28 NOTE — TELEPHONE ENCOUNTER
PT was released from hospital and was told to start taking Protonix again, Need new RX    Thank you!  Azul Davison [facility]

## 2020-09-28 NOTE — TELEPHONE ENCOUNTER
Pt called to report BP has increased over the past few days.  140-150 over 100's.  Dinora Harper NP contacted.  Pt to restart Amlodipine at 5mg daily.  Pt called with above instructions.  Pt to RTC this Wed for routine visit.  Will check in regarding BP at that time.  Pt states understanding instructions and plan of care.

## 2020-09-29 ENCOUNTER — TELEPHONE (OUTPATIENT)
Dept: CARDIOLOGY | Facility: CLINIC | Age: 60
End: 2020-09-29

## 2020-09-29 NOTE — TELEPHONE ENCOUNTER
Left voicemail for patient to complete Travel Screen for Cardiac Cath Lab appointment on 9/30 and inform patient of updated Visitor Policy.       Per heart transplant criteria, Covid test not done.

## 2020-09-30 ENCOUNTER — HOSPITAL ENCOUNTER (OUTPATIENT)
Facility: CLINIC | Age: 60
Discharge: HOME | End: 2020-09-30
Attending: INTERNAL MEDICINE | Admitting: INTERNAL MEDICINE
Payer: COMMERCIAL

## 2020-09-30 ENCOUNTER — OFFICE VISIT (OUTPATIENT)
Dept: CARDIOLOGY | Facility: CLINIC | Age: 60
End: 2020-09-30
Attending: INTERNAL MEDICINE
Payer: COMMERCIAL

## 2020-09-30 ENCOUNTER — APPOINTMENT (OUTPATIENT)
Dept: MEDSURG UNIT | Facility: CLINIC | Age: 60
End: 2020-09-30
Attending: NURSE PRACTITIONER
Payer: COMMERCIAL

## 2020-09-30 ENCOUNTER — ALLIED HEALTH/NURSE VISIT (OUTPATIENT)
Dept: PHARMACY | Facility: CLINIC | Age: 60
End: 2020-09-30
Attending: INTERNAL MEDICINE
Payer: COMMERCIAL

## 2020-09-30 ENCOUNTER — TELEPHONE (OUTPATIENT)
Dept: TRANSPLANT | Facility: CLINIC | Age: 60
End: 2020-09-30

## 2020-09-30 VITALS
OXYGEN SATURATION: 97 % | DIASTOLIC BLOOD PRESSURE: 114 MMHG | RESPIRATION RATE: 16 BRPM | SYSTOLIC BLOOD PRESSURE: 151 MMHG | TEMPERATURE: 98 F | HEART RATE: 111 BPM

## 2020-09-30 VITALS
HEIGHT: 64 IN | HEART RATE: 122 BPM | SYSTOLIC BLOOD PRESSURE: 140 MMHG | WEIGHT: 176 LBS | OXYGEN SATURATION: 98 % | BODY MASS INDEX: 30.05 KG/M2 | DIASTOLIC BLOOD PRESSURE: 93 MMHG

## 2020-09-30 DIAGNOSIS — Z12.5 SCREENING PSA (PROSTATE SPECIFIC ANTIGEN): ICD-10-CM

## 2020-09-30 DIAGNOSIS — Z94.1 HEART REPLACED BY TRANSPLANT (H): ICD-10-CM

## 2020-09-30 DIAGNOSIS — Z94.1 HEART REPLACED BY TRANSPLANT (H): Primary | ICD-10-CM

## 2020-09-30 DIAGNOSIS — I10 HYPERTENSION, UNSPECIFIED TYPE: ICD-10-CM

## 2020-09-30 DIAGNOSIS — K21.9 GASTROESOPHAGEAL REFLUX DISEASE, ESOPHAGITIS PRESENCE NOT SPECIFIED: ICD-10-CM

## 2020-09-30 DIAGNOSIS — R73.9 HYPERGLYCEMIA: ICD-10-CM

## 2020-09-30 DIAGNOSIS — Z94.1 STATUS POST HEART TRANSPLANTATION (H): ICD-10-CM

## 2020-09-30 DIAGNOSIS — B99.9 INFECTION: ICD-10-CM

## 2020-09-30 DIAGNOSIS — E78.5 DYSLIPIDEMIA: ICD-10-CM

## 2020-09-30 LAB
ALBUMIN SERPL-MCNC: 3.1 G/DL (ref 3.4–5)
ALP SERPL-CCNC: 69 U/L (ref 40–150)
ALT SERPL W P-5'-P-CCNC: 23 U/L (ref 0–70)
ANION GAP SERPL CALCULATED.3IONS-SCNC: 8 MMOL/L (ref 3–14)
AST SERPL W P-5'-P-CCNC: 19 U/L (ref 0–45)
BILIRUB SERPL-MCNC: 0.5 MG/DL (ref 0.2–1.3)
BRONCHOSCOPY: NORMAL
BUN SERPL-MCNC: 40 MG/DL (ref 7–30)
CALCIUM SERPL-MCNC: 9.2 MG/DL (ref 8.5–10.1)
CHLORIDE SERPL-SCNC: 108 MMOL/L (ref 94–109)
CO2 SERPL-SCNC: 20 MMOL/L (ref 20–32)
CREAT SERPL-MCNC: 1.92 MG/DL (ref 0.66–1.25)
ERYTHROCYTE [DISTWIDTH] IN BLOOD BY AUTOMATED COUNT: 17.6 % (ref 10–15)
GFR SERPL CREATININE-BSD FRML MDRD: 38 ML/MIN/{1.73_M2}
GLUCOSE SERPL-MCNC: 172 MG/DL (ref 70–99)
HCT VFR BLD AUTO: 29.1 % (ref 40–53)
HGB BLD-MCNC: 8.9 G/DL (ref 13.3–17.7)
MAGNESIUM SERPL-MCNC: 1.5 MG/DL (ref 1.6–2.3)
MCH RBC QN AUTO: 28.4 PG (ref 26.5–33)
MCHC RBC AUTO-ENTMCNC: 30.6 G/DL (ref 31.5–36.5)
MCV RBC AUTO: 93 FL (ref 78–100)
PHOSPHATE SERPL-MCNC: 4.3 MG/DL (ref 2.5–4.5)
PLATELET # BLD AUTO: 230 10E9/L (ref 150–450)
POTASSIUM SERPL-SCNC: 4.6 MMOL/L (ref 3.4–5.3)
PROT SERPL-MCNC: 7 G/DL (ref 6.8–8.8)
PSA SERPL-ACNC: 11 UG/L (ref 0–4)
RBC # BLD AUTO: 3.13 10E12/L (ref 4.4–5.9)
SODIUM SERPL-SCNC: 136 MMOL/L (ref 133–144)
TACROLIMUS BLD-MCNC: 4.7 UG/L (ref 5–15)
TME LAST DOSE: ABNORMAL H
WBC # BLD AUTO: 4.4 10E9/L (ref 4–11)

## 2020-09-30 PROCEDURE — 27210788 ZZH MANIFOLD CR3: Performed by: INTERNAL MEDICINE

## 2020-09-30 PROCEDURE — 99214 OFFICE O/P EST MOD 30 MIN: CPT | Mod: ZP | Performed by: INTERNAL MEDICINE

## 2020-09-30 PROCEDURE — 88350 IMFLUOR EA ADDL 1ANTB STN PX: CPT | Performed by: INTERNAL MEDICINE

## 2020-09-30 PROCEDURE — 27210794 ZZH OR GENERAL SUPPLY STERILE: Performed by: INTERNAL MEDICINE

## 2020-09-30 PROCEDURE — 80053 COMPREHEN METABOLIC PANEL: CPT | Performed by: INTERNAL MEDICINE

## 2020-09-30 PROCEDURE — 83735 ASSAY OF MAGNESIUM: CPT | Performed by: INTERNAL MEDICINE

## 2020-09-30 PROCEDURE — 93505 ENDOMYOCARDIAL BIOPSY: CPT | Performed by: INTERNAL MEDICINE

## 2020-09-30 PROCEDURE — 25000125 ZZHC RX 250: Performed by: INTERNAL MEDICINE

## 2020-09-30 PROCEDURE — 99606 MTMS BY PHARM EST 15 MIN: CPT | Performed by: PHARMACIST

## 2020-09-30 PROCEDURE — 88307 TISSUE EXAM BY PATHOLOGIST: CPT | Performed by: INTERNAL MEDICINE

## 2020-09-30 PROCEDURE — 84100 ASSAY OF PHOSPHORUS: CPT | Performed by: INTERNAL MEDICINE

## 2020-09-30 PROCEDURE — 85027 COMPLETE CBC AUTOMATED: CPT | Performed by: INTERNAL MEDICINE

## 2020-09-30 PROCEDURE — 80197 ASSAY OF TACROLIMUS: CPT | Performed by: INTERNAL MEDICINE

## 2020-09-30 PROCEDURE — 88346 IMFLUOR 1ST 1ANTB STAIN PX: CPT | Performed by: INTERNAL MEDICINE

## 2020-09-30 PROCEDURE — 36415 COLL VENOUS BLD VENIPUNCTURE: CPT | Performed by: INTERNAL MEDICINE

## 2020-09-30 PROCEDURE — 93505 ENDOMYOCARDIAL BIOPSY: CPT | Mod: 26 | Performed by: INTERNAL MEDICINE

## 2020-09-30 PROCEDURE — G0103 PSA SCREENING: HCPCS | Performed by: INTERNAL MEDICINE

## 2020-09-30 PROCEDURE — G0463 HOSPITAL OUTPT CLINIC VISIT: HCPCS | Mod: ZF

## 2020-09-30 PROCEDURE — 86352 CELL FUNCTION ASSAY W/STIM: CPT | Performed by: INTERNAL MEDICINE

## 2020-09-30 PROCEDURE — 40000166 ZZH STATISTIC PP CARE STAGE 1

## 2020-09-30 PROCEDURE — 99607 MTMS BY PHARM ADDL 15 MIN: CPT | Performed by: PHARMACIST

## 2020-09-30 PROCEDURE — C1894 INTRO/SHEATH, NON-LASER: HCPCS | Performed by: INTERNAL MEDICINE

## 2020-09-30 RX ORDER — TACROLIMUS 1 MG/1
CAPSULE ORAL
Qty: 180 CAPSULE | Refills: 11 | Status: SHIPPED | OUTPATIENT
Start: 2020-09-30 | End: 2021-01-05

## 2020-09-30 RX ORDER — LIDOCAINE 40 MG/G
CREAM TOPICAL
Status: COMPLETED | OUTPATIENT
Start: 2020-09-30 | End: 2020-09-30

## 2020-09-30 RX ADMIN — LIDOCAINE: 40 CREAM TOPICAL at 09:07

## 2020-09-30 ASSESSMENT — MIFFLIN-ST. JEOR: SCORE: 1534.33

## 2020-09-30 ASSESSMENT — PAIN SCALES - GENERAL: PAINLEVEL: NO PAIN (0)

## 2020-09-30 NOTE — PROGRESS NOTES
Returned from [facility] s/p RHC.  VSS.  Denies pain.  [facility] site CDI.  Dr. Mcallister spoke with patient at bedside.  Discharge instructions previously reviewed.  Will discharge to clinic after eating and dressing.

## 2020-09-30 NOTE — PATIENT INSTRUCTIONS
Recommendations from today's MTM visit:                                                      1. Take Magnesium 2-4 hours  from Cipro and Mycophenolate. Do the same with Calcium, or just hold for the next 2 weeks then restart.     3. 6 months post transplant you will be due for Pneumovax 23 and Shingrix.     It was great to speak with you today.  I value your experience and would be very thankful for your time with providing feedback on our clinic survey. You may receive a survey via email or text message in the next few days.     Next MTM visit: as needed    To schedule another MTM appointment, please call the clinic directly or you may call the MTM scheduling line at [phone] or toll-free at [phone].     My Clinical Pharmacist's contact information:                                                      It was a pleasure talking with you today!  Please feel free to contact me with any questions or concerns you have.      Xavi Mckeon, PharmD  MTM Pharmacist    Phone: [phone]

## 2020-09-30 NOTE — PROGRESS NOTES
[facility]   Interventional Cardiology        Consenting/Education for Right Heart Catheterization/Endomyocardial Biopsy     Patient understands as a part of routine surveillance after heart transplant we would like to perform a right heart catheterization/endomyocardial biopsy.  This procedure will be performed by Dr. Ulloa    Patient understands during the portion for right heart catheterization a fine tube (catheter) is put into the vein of the groin/neck.  It is carefully passed along until it reaches the heart and then goes up into the blood vessels of the lungs. This is done to measure a variety of pressures in your heart and can tell us how well the heart is filling and emptying, as well as monitor fluid status. While the catheter is in your neck/groin vein, a  Biopsy forceps  or pincers at the end of the catheter are used to take the tissue samples. This is may be repeated to get enough samples. The biopsy pieces are very small (one to two millimeters).     Patient also understands risks and complications of the procedure which include, but are not limited to bruising/swelling around the incision site, infection, bleeding, allergic reaction to local anesthetic.      Patient verbalized understanding of risks and benefits of the right heart catheterization and endomyocardial biopsy and has elected to proceed with the procedure.       Paola MORALES, SHAHRZAD  [facility] Interventional Cardiology  [phone]   Bulmaro Trevino (Self)  Phone 1: [phone] (Mobile)  Phone 2: [phone] (FOLLOW CARE ADVICE: No    -------------------------------------------------------

## 2020-09-30 NOTE — LETTER
[facility] [phone] (Phone)  [phone] (Fax)       2020    Patient Name: Mariusz Sharp   :  1960   MRN: [id number]  ICD10:  z94.1 , z79.899    Subject: Request for Labs    Mariusz Sharp is a heart transplant patient currently being followed by the [facility].  We would like to request your assistance in obtaining the following laboratory tests which are part of our routine surveillance program for heart transplant recipients.  (Items needed are checked.)    Please fax the lab results as soon as they are available to:   Thoracic Transplant Department  Fax Number:  [phone]     Item Frequency   X Basic metabolic panel (including:  BUN,   Serum Creatinine, Sodium, Potassium, Chloride,   CO2, Calcium, Magnesium, Phosphorus, and   Glucose) Weekly in 2020 and as needed with medication changes.   X Tacrolimus/Prograf level  (Should be mailed to the [facility] in the  provided to you by the patient.) Weekly in 2020 and as needed with medication changes.     Thank you  for your continued support and the opportunity to collaborate in the care of this patient.  If you have any questions, please call the Thoracic Transplant Team at [phone] or [phone].    .

## 2020-09-30 NOTE — NURSING NOTE
Transplant Coordinator Note    Reason for visit: 4 month visit  Coordinator: Present   Caregiver:  not present    Health concerns addressed today:  1. Still tired, breathing is better.  2. Some nausea with antibiotics.  3.       Immunosuppressants:   BID  Tacrolimus 3mg BID    Labs:       Additional Notes:         Labs, testing reviewed with patient  Medication record reviewed and reconciled  Questions and concerns addressed  Pt verbalized an understanding of plan of care.     Patient Instructions  1. Keep checking your BP twice a day.  Call us if it is consistently over 140/90.  2. CT on Friday, follow up with Infectious Disease next week.  3. Call us if the nausea worsens.  We can prescribe an anti nausea medication.    Next transplant clinic appointment:  In one month for 5 month visit  Next lab draw: TBD  Coordinator will call with all pending results.     Please call transplant coordinator with any questions:    Angelika Muller RN BSN   Post Heart Transplant Nurse Coordinator  [facility]  Questions: [phone]

## 2020-09-30 NOTE — IP AVS SNAPSHOT
[address] [address]                                    After Visit Summary   9/30/2020    Mariusz Sharp    MRN: [id number]           After Visit Summary Signature Page    I have received my discharge instructions, and my questions have been answered. I have discussed any challenges I see with this plan with the nurse or doctor.    ..........................................................................................................................................  Patient/Patient Representative Signature      ..........................................................................................................................................  Patient Representative Print Name and Relationship to Patient    ..................................................               ................................................  Date                                   Time    ..........................................................................................................................................  Reviewed by Signature/Title    ...................................................              ..............................................  Date                                               Time          22EPIC Rev 08/18

## 2020-09-30 NOTE — PATIENT INSTRUCTIONS
Patient Instructions  1. Keep checking your BP twice a day.  Call us if it is consistently over 140/90.  2. CT on Friday, follow up with Infectious Disease next week.  3. Call us if the nausea worsens.  We can prescribe an anti nausea medication.    Next transplant clinic appointment:  In one month for 5 month visit  Next lab draw: TBD  Coordinator will call with all pending results.     Please call transplant coordinator with any questions:    Angelika Muller RN BSN   Post Heart Transplant Nurse Coordinator  [facility]  Questions: [phone]

## 2020-09-30 NOTE — PROGRESS NOTES
ADULT HEART TRANSPLANT     HPI:   Mr. Sharp is a 59 year old male with history of severe heart failure with reduced ejection fraction secondary to ischemic cardiomyopathy who had underwent HeartMate 3 LVAD placement in 2018, complicated by monomorphic VT with ICD shocks, as well as chronic kidney disease paroxysmal atrial fibrillation and elevated prostate serum antigen who underwent heart transplant May 18, 2020.  Since his transplantation he has had normal graft function, with largely normal right heart catheterizations (occasionally may have elevated wedge pressures), but with normal outputs and without DSAs.  His biopsies have remained negative for significant rejection.     Unfortunately, patient was hospitalized and just discharged last week.  He presented with fever, shortness of breath, and cough. Underwent significant workup as below including multiple attempts at biopsy with interventional pulmonology. Initially treated as fungal infection given +fungitell but clinically didn't improve and the RUL mass did not change. Ultimately, tissue culture from RUL mass showed abiotrophia defectiva which is an oral microbe. This was felt to be possible contaminant per Dr. Rodriguez but given it's response to abx (CT improved, clinically improved) we will continue to treat and repeat a chest CT ~10/3 with close f/u with transplant ID. Immunosuppression was decreased, MMF 500mg bid (decreased 9/8 due to low ImmuKnow) and tacro goal level 8-10.     Pt reports reports   He otherwise says he is doing quite well.  He reports that his overall energy level and exercise tolerance have significantly improved.  He also report that his cough, sob and myalgias have also significantly improved.  From a cardiac standpoint he continues to di well.  He denies orthopnea, PND, abdominal bloating lightheadedness, dizziness, headaches, chest pain, palpitations, nausea, vomiting, diarrhea.  He also denies any problems with his medications and  reports compliance.  He had noted increase his in BP at home as just restarted amlodipine 5mg daily.      PAST MEDICAL HISTORY:  Past Medical History:   Diagnosis Date     Acute kidney injury (H)      CKD (chronic kidney disease)      Coronary artery disease      Diabetes mellitus (H)      HTN (hypertension)      Hyperlipidemia      ICD (implantable cardioverter-defibrillator), single chamber- NOT dependent 7/17/2018     Ischemic cardiomyopathy      LV (left ventricular) mural thrombus      Paroxysmal atrial flutter (H)      RVF (right ventricular failure) (H)      Systolic heart failure (H)      Ventricular tachycardia (H)      CURRENT MEDICATIONS:  [COMPLETED] lidocaine (LMX4) cream  lidocaine 1 %    ACCU-CHEK [place]OTTE PLUS test strip, Check BG 3 times daily at meals and at bedtime.  acetaminophen (TYLENOL) 325 MG tablet, Take 2 tablets (650 mg) by mouth every 4 hours as needed for other (multimodal surgical pain management along with NSAIDS and opioid medication as indicated based on pain control and physical function.)  amLODIPine (NORVASC) 5 MG tablet, Take 1 tablet (5 mg) by mouth daily  BD SILVANA U/F 32G X 4 MM insulin pen needle, Use 3-4 daily.  calcium carbonate 600 mg-vitamin D 400 units (CALTRATE) 600-400 MG-UNIT per tablet, Take 1 tablet by mouth 2 times daily (with meals)  ciprofloxacin (CIPRO) 500 MG tablet, Take 1 tablet (500 mg) by mouth every 12 hours Until 10/11 or as directed by transplant infectious disease  clindamycin (CLEOCIN) 150 MG capsule, Take 3 capsules (450 mg) by mouth every 8 hours Until 10/11 or as directed by transplant infectious disease  Continuous Blood Gluc  (DEXCOM G6 ) DON, 1 each daily  Continuous Blood Gluc Sensor (DEXCOM G6 SENSOR) MISC, 1 each every 10 days  Continuous Blood Gluc Transmit (DEXCOM G6 TRANSMITTER) MISC, 1 each every 3 months  hydrALAZINE (APRESOLINE) 25 MG tablet, Take 4 tablets (100 mg) by mouth 3 times daily  insulin aspart (NOVOLOG PEN) 100  UNIT/ML pen, 1 unit / 5  gms CHO with meals,plus correction insulin.  insulin aspart (NOVOLOG PEN) 100 UNIT/ML pen, Inject 1-11 Units Subcutaneous At Bedtime Inject 1-11 units at bedtime per custom scale   For Bedtime BG less than 200, no additional insulin needed   to 219 give 1 units.    to 239 give 2 units.    to 259 give 3 units.    to 279 give 4 units.    to 299 give 5 units.    to 319 give 6 units.    to 339 give 7 units.    to 359 give 8 units    to 379 give 9 units.   to 399 give 10 units.  BG >/=400 give 11 units.  insulin aspart (NOVOLOG PEN) 100 UNIT/ML pen, Inject 1-14 units per custom scale  For Pre-Meal BG less than 140, no additional insulin needed   to 159 give 1 units.    to 179 give 2 units.    to 199 give 3 units.    to 219 give 4 units.    to 239 give 5 units.    to 259 give 6 units.    to 279 give 7 units.    to 299 give 8 units.    to 319 give 9 units.    to 339 give 10 units.    to 359 give 11 units.    to 379 give 12 units.   to 399 give 13 units.  BG >/=400 give 14 units.  insulin glargine (LANTUS PEN) 100 UNIT/ML pen, Inject 24 Units Subcutaneous At Bedtime  loperamide (IMODIUM) 2 MG capsule, Take 1 capsule (2 mg) by mouth 4 times daily as needed for diarrhea  magnesium oxide (MAG-OX) 400 (241.3 Mg) MG tablet, Take 1 tablet (400 mg) by mouth 2 times daily  multivitamin w/minerals (THERA-VIT-M) tablet, Take 1 tablet by mouth daily  mycophenolate (GENERIC EQUIVALENT) 500 MG tablet, Take 1 tablet (500 mg) by mouth 2 times daily  pantoprazole (PROTONIX) 40 MG EC tablet, Take 1 tablet (40 mg) by mouth every morning (before breakfast)  rosuvastatin (CRESTOR) 10 MG tablet, TAKE ONE TABLET BY MOUTH ONCE DAILY  tacrolimus (GENERIC EQUIVALENT) 1 MG capsule, Take 3 mg twice a day or as directed by transplant clinic  valGANciclovir (VALCYTE) 450 MG tablet, TAKE ONE  "TABLET (450MG) BY MOUTH DAILY      ROS:  See HPI    EXAM:  BP (!) 140/93 (BP Location: Right arm, Patient Position: Chair, Cuff Size: Adult Regular)   Pulse 122   Ht 1.626 m (5' 4\")   Wt 79.8 kg (176 lb)   SpO2 98%   BMI 30.21 kg/m    General: appears comfortable, alert and articulate  Head: normocephalic, atraumatic  Eyes: anicteric sclera, EOMI  Neck: supple, 2+ carotids  Orophyarynx: moist mucosa, no lesions, dentition intact  Heart: regular, tachycardic, S1/S2, no murmur, gallop, rub, estimated JVP 8 cm  Lungs: clear, no rales or wheezing  Abdomen: soft, non-tender, bowel sounds present, no hepatomegaly  Extremities: no clubbing, cyanosis or edema  Neurological: normal speech and affect, no gross motor deficits    Labs - reviewed with patient in clinic today:  CBC RESULTS:  Lab Results   Component Value Date    WBC 4.4 09/30/2020    RBC 3.13 (L) 09/30/2020    HGB 8.9 (L) 09/30/2020    HCT 29.1 (L) 09/30/2020    MCV 93 09/30/2020    MCH 28.4 09/30/2020    MCHC 30.6 (L) 09/30/2020    RDW 17.6 (H) 09/30/2020     09/30/2020       CMP RESULTS:  Lab Results   Component Value Date     09/30/2020    POTASSIUM 4.6 09/30/2020    CHLORIDE 108 09/30/2020    CO2 20 09/30/2020    ANIONGAP 8 09/30/2020     (H) 09/30/2020    BUN 40 (H) 09/30/2020    CR 1.92 (H) 09/30/2020    GFRESTIMATED 38 (L) 09/30/2020    GFRESTBLACK 44 (L) 09/30/2020    ARLENE 9.2 09/30/2020    BILITOTAL 0.5 09/30/2020    ALBUMIN 3.1 (L) 09/30/2020    ALKPHOS 69 09/30/2020    ALT 23 09/30/2020    AST 19 09/30/2020        INR RESULTS:  Lab Results   Component Value Date    INR 1.06 08/26/2020       LIPID RESULTS:  Lab Results   Component Value Date    CHOL 113 09/01/2020    HDL 28 (L) 09/01/2020    LDL 57 09/01/2020    TRIG 143 09/01/2020       IMMUNOSUPPRESSANT LEVELS:  Lab Results   Component Value Date    TACROL 6.3 09/24/2020    DOSTAC Not Provided 09/24/2020       No components found for: CK  Lab Results   Component Value Date    " MAG 1.5 (L) 09/30/2020     Lab Results   Component Value Date    A1C 7.4 (H) 08/19/2020     Lab Results   Component Value Date    PHOS 4.3 09/30/2020     Lab Results   Component Value Date    NTBNP 404 (H) 06/26/2019     Lab Results   Component Value Date    SAITESTMET [facility] 08/13/2020    SAICELL Class I 08/13/2020    SP6PJLKPT Cw:15 08/13/2020    HT4DIGDDNY Cw:18 08/13/2020    SAIREPCOM  08/13/2020      Test performed by modified procedure. Serum heat inactivated and tested   by a modified (Noonan) protocol including fetal calf serum addition.   High-risk, mfi >3,000. Mod-risk, mfi 500-3,000.       Lab Results   Component Value Date    SAIITESTME [facility] 08/13/2020    SAIICELL Class II 08/13/2020    TC3DWFLCO None 08/13/2020    RP1BUKMOTI None 08/13/2020    SAIIREPCOM  08/13/2020      Test performed by modified procedure. Serum heat inactivated and tested   by a modified (Noonan) protocol including fetal calf serum addition.   High-risk, mfi >3,000. Mod-risk, mfi 500-3,000.       Lab Results   Component Value Date    CSPEC Plasma 09/13/2020       Diagnostic Studies:  Echo 8/31/2020  Interpretation Summary  Global and regional left ventricular function is hyperkinetic with an EF >70%.  Right ventricular function, chamber size, wall motion, and thickness are normal.  Small collapsed IVC.  Trivial pericardial effusion is present.    RHC 9/30/2020  RA 4/3/2 mmHg  RV 22/2 mmHg  PA 20/12/15 mmHg  CWP 11/12/12 mmHg  CO (William) 5.14 L/min   CO (TD) 6.33 L/min  CI (William) 2.78 L/min/m2  CI (TD) 3.4 L/min/m2  SVR 1694 dynes  PVR 0.58 LERMA    Assessment/Plan:  Mr. Sharp is a 59 year old male who presents today after recent hospitalization (see HPI).    # Status post OHT on 5/18/20, history of ICM  # Chronic immunosuppression  * Rejection history: none.   * Recent immunosuppression changes: MMF decreased while in hospital  * Last biopsy: today, pending  * Intolerance to medications: none     Graft function:  - Fluid status: RHC  normal  - BP: elevated but just resumed amlodipine 5mg daily.  Instructed patient to continue to monitor BP and call clinic if remains consistently < 140/90   - HRs: > 80     Immunosuppression:  - Received Simulect 5/18 and 5/22  - MMF 500mg bid, reduced due to infection and low immunknow  - tacrolimus goal level 8-10.  Follow-up today's level and adjust as indicated     PPx:  - CAV: aspirin 81mg daily, crestor 10mg daily  - PCP: Bactrim stopped 5/27 due to hyperkalemia, pentamidine given 6/30, and 7/28, no further ppx needed since of  steroids  - CMV: Valcyte 450mg daily (renally dosed), x6 months (through 11/2020)  - Thrush: Nystatin QID  - Osteoporosis: calcium/vitamin D   - GI: pantoprazole 40mg daily     Serostatus: CMV D+/R-  EBV D-/R+ Toxo D-/R-     #Hypertension  Elevated today.  on amlodipine 5 mg daily and hydralazine 25 mg tid but just resumed amlodipine 5mg daily.  Instructed patient to continue to monitor BP and call clinic if remains consistently < 140/90.  Encouraged patient to continue regular aerobic exercise aiming for at least 150 minutes of moderate physical activity or 75 minutes of vigorous physical activity - or an equal combination of both - each week. and follow low-salt, heart healthy diet.       #DM Type II    Following with endocrinology      #Recent infection:  Symptoms improved.  Continue clinda and cipro.  Obtain CT on Friday, follow up with Infectious Disease next week as scheduled.  Duration of antibiotic therapy to be determined at that time.       Follow up:  in heart transplant clinic in 1 month for 5 month visit with testing per protocol.  Will be happy to see sooner if change in clinical status or new questions/concerns arise.      Jenni Ortiz MD  Section Head - Advanced Heart Failure, Transplantation and Mechanical Circulatory Support  Director - Adult Congenital and Cardiovascular Genetics Center  Associate Professor of Medicine, [facility] [place]

## 2020-09-30 NOTE — PROGRESS NOTES
MTM ENCOUNTER  SUBJECTIVE/OBJECTIVE:                           Mariusz Sharp is a 59 year old male called for a transitions of care visit. He was discharged from [facility] on 9/24/20 for fever/ infection.      Patient consented to a telehealth visit: yes  Telemedicine Visit Details  Type of service:  Telephone visit  Start Time: 12:52 PM  End Time: 1:12 PM  Originating Location (pt. Location): Home  Distant Location (provider location):  [facility] MEDICATION THERAPY MANAGEMENT  Mode of Communication:  Telephone    Chief Complaint: Was in the hospital last week for a fungal and bacterial infection.     Allergies/ADRs: Reviewed in chart  Tobacco: He reports that he has never smoked. He has never used smokeless tobacco.  Alcohol: not currently using  PMH: Reviewed in chart    Medication Adherence/Access: Patient uses pill box(es).  Patient takes medications 5 time(s) per day.   Per patient, misses medication 0 times per week.     Heart Transplant:  Current immunosuppressants include TAC 3mg BID and MMF 500mg BID, Prednisone 10mg BID.  Pt reports tremors, cramps early morning  Transplant date: 5/18/20  Estimated Creatinine Clearance: 40.5 mL/min (A) (based on SCr of 1.92 mg/dL (H)).  CMV prophylaxis: CrCl 40 to 59 mL/minute: Valcyte 450 mg once daily Donor (+), Recipient (-), treat 6 months post tx   PCP prophylaxis: Inhaled Pentamidine 300mg q 4 weeks, 6/1/20, stopped bactrim due to hyperkalemia.   PPI use: Pantoprazole 40mg daily. Denies GERD sx.   Current supplements for electrolyte replacement: Calcium/D BID, Magnesium 400mg BID  Tx Coordinator: Angelika Muller, Using Med Card: Yes  Immunizations: annual flu shot 0565-5496 season; [id number]:  unknown; Prevnar 13: 2019; TDaP:  2019; Shingrix: unknown    Infection: Pt is taking Clindamycin 450mg TID (has to take with food to avoid emesis), Ciprofloxacin 500mg BID    GERD: Current medications include: Protonix (pantoprazole) 40mg once daily. Pt c/o of heartburn, so they  restarted Protonix.  Patient feels that current regimen is effective.    Hyperglycemia:  Pt currently taking Novolog 1 unit per 4 grams of carbs and sliding scale, Lantus 24 units once daily. Pt is not experiencing side effects.   SMBG: Dexcom 6. Ranges (patient reported):   -120 this morning.   Symptoms of low blood sugar? 69 last night, no symptoms.   Symptoms of high blood sugar? None  Lab Results   Component Value Date    A1C 7.4 08/19/2020    A1C 7.2 08/13/2020    A1C 7.8 06/15/2020    A1C 8.0 05/12/2020    A1C 9.5 07/05/2019     Hypertension: Current medications include Hydralazine 100mg TID, Amlodipine 5mg at bedtime started this week.  Patient does self-monitor BP. 140-150/100, BPs are improving since starting Amlodipine.  Patient reports no current medication side effects.  BP Readings from Last 3 Encounters:   09/30/20 (!) 151/114   09/30/20 (!) 140/93   09/24/20 (!) 156/109     Hyperlipidemia: Current therapy includes Rosuvastatin 10mg once daily.  Pt reports cramps have resolved.   The ASCVD Risk score (Karan DOUGLAS Jr., et al., 2013) failed to calculate for the following reasons:    The patient has a prior MI or stroke diagnosis  Recent Labs   Lab Test 06/15/20  0826 08/06/18  1025   CHOL 183 126   HDL 86 52   LDL 82 24   TRIG 73 246*       Today's Vitals: There were no vitals taken for this visit.    ASSESSMENT:                            Medication Adherence: good, no issues identified    Heart Transplant: Pt to separate Calcium and Magnesium from Ciprofloxacin. If he wishes he can hold calcium for the next couple weeks until Cipro course is complete.After completed should separate Magnesium from MMF regardless. Will be due for pneumovax and Shingrix 6 months post txp.    Infection: See heart txp assessment.     GERD: Stable.     Hyperglycemia:  Stable.    Hypertension: Stable.     Hyperlipidemia: Stable.     PLAN:                          Post Discharge Medication Reconciliation Status: discharge  medications reconciled and changed, per note/orders.    1. Take Magnesium 2-4 hours  from Cipro and Mycophenolate. Do the same with Calcium, or just hold for the next 2 weeks then restart.   3. 6 months post transplant you will be due for Pneumovax 23 and Shingrix.     I spent 20 minutes with this patient today. I offer these suggestions for consideration by txp team. A copy of the visit note was provided to the patient's referring provider.    Will follow up in 2 months.    The patient was sent via [employer] a summary of these recommendations.     Xavi Mckeon, PharmD  [facility] Pharmacist    Phone: [phone]

## 2020-09-30 NOTE — IP AVS SNAPSHOT
MRN:[id number]                      After Visit Summary   9/30/2020    Mariusz Sharp    MRN: [id number]           Visit Information        Department      9/30/2020  8:21 AM [address] [facility]          Review of your medicines      UNREVIEWED medicines. Ask your doctor about these medicines       Dose / Directions   acetaminophen 325 MG tablet  Commonly known as:  TYLENOL  Used for:  Status post heart transplantation (H)      Dose:  650 mg  Take 2 tablets (650 mg) by mouth every 4 hours as needed for other (multimodal surgical pain management along with NSAIDS and opioid medication as indicated based on pain control and physical function.)  Quantity:     Refills:  0     amLODIPine 5 MG tablet  Commonly known as:  NORVASC  Used for:  Heart replaced by transplant (H)      Dose:  5 mg  Take 1 tablet (5 mg) by mouth daily  Quantity:  90 tablet  Refills:  3     calcium carbonate 600 mg-vitamin D 400 units 600-400 MG-UNIT per tablet  Commonly known as:  CALTRATE  Used for:  Status post heart transplantation (H)      Dose:  1 tablet  Take 1 tablet by mouth 2 times daily (with meals)  Quantity:     Refills:  0     ciprofloxacin 500 MG tablet  Commonly known as:  CIPRO  Indication:  Pneumonia caused by Inhaling a Substance Into the Lungs  Used for:  Lung mass      Dose:  500 mg  Take 1 tablet (500 mg) by mouth every 12 hours Until 10/11 or as directed by transplant infectious disease  Quantity:  60 tablet  Refills:  0     clindamycin 150 MG capsule  Commonly known as:  CLEOCIN  Indication:  Pneumonia caused by Inhaling a Substance Into the Lungs  Used for:  Lung mass      Dose:  450 mg  Take 3 capsules (450 mg) by mouth every 8 hours Until 10/11 or as directed by transplant infectious disease  Quantity:  90 capsule  Refills:  0     hydrALAZINE 25 MG tablet  Commonly known as:  APRESOLINE  Used for:  Heart replaced by transplant (H)      Dose:  100 mg  Take 4 tablets (100 mg) by mouth 3 times  daily  Quantity:  270 tablet  Refills:  3     * insulin aspart 100 UNIT/ML pen  Commonly known as:  NovoLOG PEN  Used for:  Status post heart transplantation (H)      1 unit / 5  gms CHO with meals,plus correction insulin.  Quantity:  3 mL  Refills:  3     * insulin aspart 100 UNIT/ML pen  Commonly known as:  NovoLOG PEN  Used for:  Status post heart transplantation (H)      Dose:  1-11 Units  Inject 1-11 Units Subcutaneous At Bedtime Inject 1-11 units at bedtime per custom scale   For Bedtime BG less than 200, no additional insulin needed   to 219 give 1 units.    to 239 give 2 units.    to 259 give 3 units.    to 279 give 4 units.    to 299 give 5 units.    to 319 give 6 units.    to 339 give 7 units.    to 359 give 8 units    to 379 give 9 units.   to 399 give 10 units.  BG >/=400 give 11 units.  Quantity:  3 mL  Refills:  0     * insulin aspart 100 UNIT/ML pen  Commonly known as:  NovoLOG PEN  Used for:  Status post heart transplantation (H)      Inject 1-14 units per custom scale  For Pre-Meal BG less than 140, no additional insulin needed   to 159 give 1 units.    to 179 give 2 units.    to 199 give 3 units.    to 219 give 4 units.    to 239 give 5 units.    to 259 give 6 units.    to 279 give 7 units.    to 299 give 8 units.    to 319 give 9 units.    to 339 give 10 units.    to 359 give 11 units.    to 379 give 12 units.   to 399 give 13 units.  BG >/=400 give 14 units.  Quantity:  3 mL  Refills:  0     insulin glargine 100 UNIT/ML pen  Commonly known as:  LANTUS PEN  Used for:  Status post heart transplantation (H)      Dose:  24 Units  Inject 24 Units Subcutaneous At Bedtime  Quantity:  15 mL  Refills:  3     loperamide 2 MG capsule  Commonly known as:  IMODIUM  Used for:  Diarrhea, unspecified type      Dose:  2 mg  Take 1 capsule (2 mg) by mouth 4 times daily as needed  for diarrhea  Quantity:  60 capsule  Refills:  0     magnesium oxide 400 (241.3 Mg) MG tablet  Commonly known as:  MAG-OX  Used for:  Hypomagnesemia      Dose:  400 mg  Take 1 tablet (400 mg) by mouth 2 times daily  Quantity:  90 tablet  Refills:  3     multivitamin w/minerals tablet  Used for:  Lung mass, Heart replaced by transplant (H), Diarrhea, unspecified type      Dose:  1 tablet  Take 1 tablet by mouth daily  Quantity:  90 tablet  Refills:  3     mycophenolate 500 MG tablet  Commonly known as:  GENERIC EQUIVALENT  Used for:  Status post heart transplantation (H)      Dose:  500 mg  Take 1 tablet (500 mg) by mouth 2 times daily  Quantity:  180 tablet  Refills:  11     pantoprazole 40 MG EC tablet  Commonly known as:  PROTONIX  Used for:  Heart replaced by transplant (H)      Dose:  40 mg  Take 1 tablet (40 mg) by mouth every morning (before breakfast)  Quantity:  90 tablet  Refills:  3     rosuvastatin 10 MG tablet  Commonly known as:  CRESTOR  Used for:  Status post heart transplantation (H)      TAKE ONE TABLET BY MOUTH ONCE DAILY  Quantity:  90 tablet  Refills:  3     tacrolimus 1 MG capsule  Commonly known as:  GENERIC EQUIVALENT  Used for:  Status post heart transplantation (H)      Take 3 mg twice a day or as directed by transplant clinic  Quantity:  180 capsule  Refills:  11     valGANciclovir 450 MG tablet  Commonly known as:  VALCYTE  Used for:  Status post heart transplantation (H)      TAKE ONE TABLET (450MG) BY MOUTH DAILY  Quantity:  30 tablet  Refills:  3         * This list has 3 medication(s) that are the same as other medications prescribed for you. Read the directions carefully, and ask your doctor or other care provider to review them with you.            CONTINUE these medicines which have NOT CHANGED       Dose / Directions   Accu-Chek Denise Plus test strip  Used for:  Status post heart transplantation (H)  Generic drug:  blood glucose      Check BG 3 times daily at meals and at  bedtime.  Quantity:  300 strip  Refills:  0     BD SILVANA U/F 32G X 4 MM miscellaneous  Used for:  Type 2 diabetes mellitus with hyperglycemia, with long-term current use of insulin (H)  Generic drug:  insulin pen needle      Use 3-4 daily.  Quantity:  250 each  Refills:  4     Dexcom G6  Monie  Used for:  Type 2 diabetes mellitus with hyperglycemia, with long-term current use of insulin (H)      Dose:  1 each  1 each daily  Quantity:  1 Device  Refills:  1     Dexcom G6 Sensor Misc  Used for:  Type 2 diabetes mellitus with hyperglycemia, with long-term current use of insulin (H)      Dose:  1 each  1 each every 10 days  Quantity:  9 each  Refills:  3     Dexcom G6 Transmitter Misc  Used for:  Type 2 diabetes mellitus with hyperglycemia, with long-term current use of insulin (H)      Dose:  1 each  1 each every 3 months  Quantity:  1 each  Refills:  3              Protect others around you: Learn how to safely use, store and throw away your medicines at www.disposemymeds.org.       Follow-ups after your visit       Your next 10 appointments already scheduled    Sep 30, 2020  9:00 AM CDT  Procedure - 2.5 hour with U2A ROOM 9  Unit 2A [facility] ([facility]) [address] [facility] [address]      Sep 30, 2020  Procedure with Artemio Ulloa MD  [facility]  Heart Cath Lab ([facility]) [address] [facility] [address]  [phone]   The [facility] is located on the corner of [address] and [address] on the [address]. It is easily accessible from virtually any point in the [place] area, via [address] and [address].   Sep 30, 2020 12:00 PM CDT  (Arrive by 11:45 AM)  RETURN HEART TRANSPLANT with Jenni Ortiz MD  [facility] ([facility]) [address]  [address]  [phone]      Sep 30, 2020  1:00 PM CDT  (Arrive by 12:45 PM)  Office Visit with Xavi Mckeon RPH  [facility] Medication Therapy Management ([facility]) [address]  [address] [facility] [id number] [phone]   Bring a current list of meds and any records pertaining to this visit.  For Physicals, please bring immunization records and any forms needing to be filled out. Please arrive 10 minutes early to complete paperwork.     Oct 01, 2020 10:30 AM CDT  (Arrive by 10:15 AM)  Video Visit with Donny Gomes MD  [facility] Urology [facility] ([facility]) [address]  [address] [facility] [address] [phone]   [facility]  Note: this is not an onsite visit; there is no need to come to the facility.  Please have a list of all current medications available for appointment.      Oct 02, 2020  2:00 PM CDT  (Arrive by 1:45 PM)  CT CHEST W CONTRAST with W[place]CT1  [facility] CT ([facility]) [address] [address]  [phone]   How do I prepare for my exam? (Food and drink instructions)  **You will have contrast for this exam.**  No Food and Drink Restrictions    The day before your exam, drink extra fluids at least six 8-ounce glasses (unless your doctor tells you to restrict your fluids).    How do I prepare for my exam? (Other instructions)  Patients over 70 or patients with diabetes or kidney problems: If you haven't had a blood test (creatinine test) within the last 30 days, the Cardiologist/Radiologist may require you to get this test prior to your exam.    What should I wear: Please wear loose clothing, such as a sweat suit or jogging clothes.  Avoid snaps, zippers and other metal. We may ask you to undress and put on a hospital gown.    How long does the exam take: Most scans take less than 20 minutes.    What  should I bring: Please bring any scans or X-rays taken at other hospitals, if similar tests were done. Also bring a list of your medicines, including vitamins, minerals and over-the-counter drugs. It is safest to leave personal items at home.    Do I need a :  No  is needed.    What do I need to tell my doctor?  Be sure to tell your doctor:  * If you have any allergies.  * If there's any chance you are pregnant.  * If you are breastfeeding.  * If you have diabetes as your medication may need to be adjusted for this exam.    What should I do after the exam: No restrictions, You may resume normal activities.    What is this test: A CT (computed tomography) scan is a series of pictures that allows us to look inside your body. The scanner creates images of the body in cross sections, much like slices of bread. This helps us see any problems more clearly. You may receive contrast (X-ray dye) before or during your scan. Contrast is given through an IV (small needle in your arm).    Who should I call with questions: If you have any questions, please call the Imaging Department where you will have your exam. Directions, parking instructions, and other information is available on our website, [email or web address].     Oct 06, 2020  4:30 PM CDT  (Arrive by 4:15 PM)  Video Visit with Tavares Rodriguez MD  [facility] ([facility]) [address] [place] [address] [phone]   [facility] and Infectious Diseases  Note: this is not an onsite visit; there is no need to come to the facility.  Please have a list of all current medications available for appointment.      Oct 29, 2020 11:00 AM CDT  LAB with  LAB  [facility] [place] ([facility]) [address] [place] [address]  [address] [facility] [address] [phone]   Please do not eat 10-12 hours before your appointment if you are coming in  fasting for labs on lipids, cholesterol, or glucose (sugar). Does not apply to pregnant women. Water, tea and black coffee (with nothing added) is okay. Do not drink other fluids, diet soda or gum. If you have concerns about taking your medications, please send a message by clicking on Secure Messaging, Message Your Care Team.     Oct 29, 2020 11:30 AM CDT  (Arrive by 11:15 AM)  RETURN HEART TRANSPLANT with CARMEN Jha [facility] ([facility]) [address] [facility] [address]  [phone]      Oct 29, 2020 12:30 PM CDT  Procedure - 2.5 hour with U2A ROOM 8  Unit 2A [facility] ([facility], [place]) [address] [facility] [address]         Care Instructions       Further instructions from your care team       [facility]                        Interventional Cardiology  Discharge Instructions   Post Right Heart Cath and Biopsy      AFTER YOU GO HOME:    DO drink plenty of fluids    DO resume your regular diet and medications unless otherwise instructed by your Primary Physician    Do Not scrub the procedure site vigorously    No lotion or powder to the puncture site for 3 days    CALL YOUR PRIMARY PHYSICIAN IF: You may resume all normal activity.  Monitor neck site for bleeding, swelling, or voice changes. If you notice bleeding or swelling immediately apply pressure to the site and call number below to speak with Cardiology Fellow.  If you experience any changes in your breathing you should call your doctor immediately or come to the closest Emergency Department.  Do not drive yourself.    ADDITIONAL INSTRUCTIONS: Medications: You are to resume all home medications including anticoagulation therapy unless otherwise advised by your primary cardiologist or nurse coordinator.    Follow Up: Per your primary cardiology team    If you have any questions or concerns regarding your  procedure site please call [phone] at anytime and ask for Cardiology Fellow on call.  They are available 24 hours a day.  You may also contact the Cardiology Clinic after hours number at [phone].                                                       Telephone Numbers [phone] Monday-Friday 8:00 am to 4:30 pm    [phone] After 4:30 pm Monday-Friday, Weekends & Holidays  Ask for Interventional Cardiologist on call. Someone is on call 24 hours/day   [facility] toll free number [phone] Monday-Friday 8:00 am to 4:30 pm   [facility] Emergency Dept [phone]                   Additional Information About Your Visit       [employer] Information    [employer] gives you secure access to your electronic health record. If you see a primary care provider, you can also send messages to your care team and make appointments. If you have questions, please call your primary care clinic.  If you do not have a primary care provider, please call [phone] and they will assist you.       Care EveryWhere ID    This is your Care EveryWhere ID. This could be used by other organizations to access your [facility] medical records  [id number]        Primary Care Provider Office Phone # Fax #    Milad Fry [phone]      Equal Access to Services    GILLIAN COX AH: Hadii can weaver hadasho Soomaali, waaxda luqadaha, qaybta kaalmada adeegyada, mynor gibbons. So [facility] [phone].    ATENCIÓN: Si habla español, tiene a waddell disposición servicios gratuitos de asistencia lingüística. Llame al [phone].    We comply with applicable federal and state civil rights laws, including the [place] Human Rights Act. We do not discriminate on the basis of race, color, creed, Mandaeism, national origin, marital status, age, disability, sex, sexual orientation, or gender identity.       Thank you!    Thank you for choosing [facility] for your care. Our goal is always to provide you with  excellent care. Hearing back from our patients is one way we can continue to improve our services. Please take a few minutes to complete the written survey that you may receive in the mail after you visit with us. Thank you!            Medication List      Medications          Morning Afternoon Evening Bedtime As Needed    Accu-Chek Denise Plus test strip  INSTRUCTIONS:  Check BG 3 times daily at meals and at bedtime.  Generic drug:  blood glucose                     BD SILVANA U/F 32G X 4 MM miscellaneous  INSTRUCTIONS:  Use 3-4 daily.  Generic drug:  insulin pen needle                     Dexcom G6  Monie  INSTRUCTIONS:  1 each daily                     Dexcom G6 Sensor Misc  INSTRUCTIONS:  1 each every 10 days                     Dexcom G6 Transmitter Misc  INSTRUCTIONS:  1 each every 3 months                       ASK your doctor about these medications          Morning Afternoon Evening Bedtime As Needed    acetaminophen 325 MG tablet  Also known as:  TYLENOL  INSTRUCTIONS:  Take 2 tablets (650 mg) by mouth every 4 hours as needed for other (multimodal surgical pain management along with NSAIDS and opioid medication as indicated based on pain control and physical function.)                     amLODIPine 5 MG tablet  Also known as:  NORVASC  INSTRUCTIONS:  Take 1 tablet (5 mg) by mouth daily                     calcium carbonate 600 mg-vitamin D 400 units 600-400 MG-UNIT per tablet  Also known as:  CALTRATE  INSTRUCTIONS:  Take 1 tablet by mouth 2 times daily (with meals)                     ciprofloxacin 500 MG tablet  Also known as:  CIPRO  INSTRUCTIONS:  Take 1 tablet (500 mg) by mouth every 12 hours Until 10/11 or as directed by transplant infectious disease  Reason for med:  Pneumonia caused by Inhaling a Substance Into the Lungs                     clindamycin 150 MG capsule  Also known as:  CLEOCIN  INSTRUCTIONS:  Take 3 capsules (450 mg) by mouth every 8 hours Until 10/11 or as directed by transplant  infectious disease  Reason for med:  Pneumonia caused by Inhaling a Substance Into the Lungs                     hydrALAZINE 25 MG tablet  Also known as:  APRESOLINE  INSTRUCTIONS:  Take 4 tablets (100 mg) by mouth 3 times daily                     * insulin aspart 100 UNIT/ML pen  Also known as:  NovoLOG PEN  INSTRUCTIONS:  1 unit / 5  gms CHO with meals,plus correction insulin.                     * insulin aspart 100 UNIT/ML pen  Also known as:  NovoLOG PEN  INSTRUCTIONS:  Inject 1-11 Units Subcutaneous At Bedtime Inject 1-11 units at bedtime per custom scale   For Bedtime BG less than 200, no additional insulin needed   to 219 give 1 units.    to 239 give 2 units.    to 259 give 3 units.    to 279 give 4 units.    to 299 give 5 units.    to 319 give 6 units.    to 339 give 7 units.    to 359 give 8 units    to 379 give 9 units.   to 399 give 10 units.  BG >/=400 give 11 units.                     * insulin aspart 100 UNIT/ML pen  Also known as:  NovoLOG PEN  INSTRUCTIONS:  Inject 1-14 units per custom scale  For Pre-Meal BG less than 140, no additional insulin needed   to 159 give 1 units.    to 179 give 2 units.    to 199 give 3 units.    to 219 give 4 units.    to 239 give 5 units.    to 259 give 6 units.    to 279 give 7 units.    to 299 give 8 units.    to 319 give 9 units.    to 339 give 10 units.    to 359 give 11 units.    to 379 give 12 units.   to 399 give 13 units.  BG >/=400 give 14 units.                     insulin glargine 100 UNIT/ML pen  Also known as:  LANTUS PEN  INSTRUCTIONS:  Inject 24 Units Subcutaneous At Bedtime  Doctor's comments:  If Lantus is not covered by insurance, may substitute Basaglar at same dose and frequency.                       loperamide 2 MG capsule  Also known as:  IMODIUM  INSTRUCTIONS:  Take 1 capsule (2 mg) by mouth 4 times daily as  needed for diarrhea                     magnesium oxide 400 (241.3 Mg) MG tablet  Also known as:  MAG-OX  INSTRUCTIONS:  Take 1 tablet (400 mg) by mouth 2 times daily                     multivitamin w/minerals tablet  INSTRUCTIONS:  Take 1 tablet by mouth daily                     mycophenolate 500 MG tablet  Also known as:  GENERIC EQUIVALENT  INSTRUCTIONS:  Take 1 tablet (500 mg) by mouth 2 times daily                     pantoprazole 40 MG EC tablet  Also known as:  PROTONIX  INSTRUCTIONS:  Take 1 tablet (40 mg) by mouth every morning (before breakfast)                     rosuvastatin 10 MG tablet  Also known as:  CRESTOR  INSTRUCTIONS:  TAKE ONE TABLET BY MOUTH ONCE DAILY                     tacrolimus 1 MG capsule  Also known as:  GENERIC EQUIVALENT  INSTRUCTIONS:  Take 3 mg twice a day or as directed by transplant clinic                     valGANciclovir 450 MG tablet  Also known as:  VALCYTE  INSTRUCTIONS:  TAKE ONE TABLET (450MG) BY MOUTH DAILY                        * This list has 3 medication(s) that are the same as other medications prescribed for you. Read the directions carefully, and ask your doctor or other care provider to review them with you.

## 2020-09-30 NOTE — DISCHARGE INSTRUCTIONS
[facility]                        Interventional Cardiology  Discharge Instructions   Post Right Heart Cath and Biopsy      AFTER YOU GO HOME:    DO drink plenty of fluids    DO resume your regular diet and medications unless otherwise instructed by your Primary Physician    Do Not scrub the procedure site vigorously    No lotion or powder to the puncture site for 3 days    CALL YOUR PRIMARY PHYSICIAN IF: You may resume all normal activity.  Monitor neck site for bleeding, swelling, or voice changes. If you notice bleeding or swelling immediately apply pressure to the site and call number below to speak with Cardiology Fellow.  If you experience any changes in your breathing you should call your doctor immediately or come to the closest Emergency Department.  Do not drive yourself.    ADDITIONAL INSTRUCTIONS: Medications: You are to resume all home medications including anticoagulation therapy unless otherwise advised by your primary cardiologist or nurse coordinator.    Follow Up: Per your primary cardiology team    If you have any questions or concerns regarding your procedure site please call [phone] at anytime and ask for Cardiology Fellow on call.  They are available 24 hours a day.  You may also contact the Cardiology Clinic after hours number at [phone].                                                       Telephone Numbers [phone] Monday-Friday 8:00 am to 4:30 pm    [phone] After 4:30 pm Monday-Friday, Weekends & Holidays  Ask for Interventional Cardiologist on call. Someone is on call 24 hours/day   [facility] toll free number [phone] Monday-Friday 8:00 am to 4:30 pm   [facility] Emergency Dept [phone]

## 2020-09-30 NOTE — LETTER
9/30/2020      RE: Mariusz Sharp  [address] [facility] [id number]       Dear Colleague,    Thank you for the opportunity to participate in the care of your patient, Mariusz Sharp, at the [facility] HEART [facility] at [facility]. Please see a copy of my visit note below.    ADULT HEART TRANSPLANT     HPI:   Mr. Sharp is a 59 year old male with history of severe heart failure with reduced ejection fraction secondary to ischemic cardiomyopathy who had underwent HeartMate 3 LVAD placement in 2018, complicated by monomorphic VT with ICD shocks, as well as chronic kidney disease paroxysmal atrial fibrillation and elevated prostate serum antigen who underwent heart transplant May 18, 2020.  Since his transplantation he has had normal graft function, with largely normal right heart catheterizations (occasionally may have elevated wedge pressures), but with normal outputs and without DSAs.  His biopsies have remained negative for significant rejection.     Unfortunately, patient was hospitalized and just discharged last week.  He presented with fever, shortness of breath, and cough. Underwent significant workup as below including multiple attempts at biopsy with interventional pulmonology. Initially treated as fungal infection given +fungitell but clinically didn't improve and the RUL mass did not change. Ultimately, tissue culture from RUL mass showed abiotrophia defectiva which is an oral microbe. This was felt to be possible contaminant per Dr. Rodriguez but given it's response to abx (CT improved, clinically improved) we will continue to treat and repeat a chest CT ~10/3 with close f/u with transplant ID. Immunosuppression was decreased, MMF 500mg bid (decreased 9/8 due to low ImmuKnow) and tacro goal level 8-10.     Pt reports reports   He otherwise says he is doing quite well.  He reports that his overall energy level and exercise tolerance have significantly improved.   He also report that his cough, sob and myalgias have also significantly improved.  From a cardiac standpoint he continues to di well.  He denies orthopnea, PND, abdominal bloating lightheadedness, dizziness, headaches, chest pain, palpitations, nausea, vomiting, diarrhea.  He also denies any problems with his medications and reports compliance.  He had noted increase his in BP at home as just restarted amlodipine 5mg daily.      PAST MEDICAL HISTORY:  Past Medical History:   Diagnosis Date     Acute kidney injury (H)      CKD (chronic kidney disease)      Coronary artery disease      Diabetes mellitus (H)      HTN (hypertension)      Hyperlipidemia      ICD (implantable cardioverter-defibrillator), single chamber- NOT dependent 7/17/2018     Ischemic cardiomyopathy      LV (left ventricular) mural thrombus      Paroxysmal atrial flutter (H)      RVF (right ventricular failure) (H)      Systolic heart failure (H)      Ventricular tachycardia (H)      CURRENT MEDICATIONS:  [COMPLETED] lidocaine (LMX4) cream  lidocaine 1 %    ACCU-CHEK [place]TE PLUS test strip, Check BG 3 times daily at meals and at bedtime.  acetaminophen (TYLENOL) 325 MG tablet, Take 2 tablets (650 mg) by mouth every 4 hours as needed for other (multimodal surgical pain management along with NSAIDS and opioid medication as indicated based on pain control and physical function.)  amLODIPine (NORVASC) 5 MG tablet, Take 1 tablet (5 mg) by mouth daily  BD SILVANA U/F 32G X 4 MM insulin pen needle, Use 3-4 daily.  calcium carbonate 600 mg-vitamin D 400 units (CALTRATE) 600-400 MG-UNIT per tablet, Take 1 tablet by mouth 2 times daily (with meals)  ciprofloxacin (CIPRO) 500 MG tablet, Take 1 tablet (500 mg) by mouth every 12 hours Until 10/11 or as directed by transplant infectious disease  clindamycin (CLEOCIN) 150 MG capsule, Take 3 capsules (450 mg) by mouth every 8 hours Until 10/11 or as directed by transplant infectious disease  Continuous Blood Gluc   (DEXCOM G6 ) DON, 1 each daily  Continuous Blood Gluc Sensor (DEXCOM G6 SENSOR) MISC, 1 each every 10 days  Continuous Blood Gluc Transmit (DEXCOM G6 TRANSMITTER) MISC, 1 each every 3 months  hydrALAZINE (APRESOLINE) 25 MG tablet, Take 4 tablets (100 mg) by mouth 3 times daily  insulin aspart (NOVOLOG PEN) 100 UNIT/ML pen, 1 unit / 5  gms CHO with meals,plus correction insulin.  insulin aspart (NOVOLOG PEN) 100 UNIT/ML pen, Inject 1-11 Units Subcutaneous At Bedtime Inject 1-11 units at bedtime per custom scale   For Bedtime BG less than 200, no additional insulin needed   to 219 give 1 units.    to 239 give 2 units.    to 259 give 3 units.    to 279 give 4 units.    to 299 give 5 units.    to 319 give 6 units.    to 339 give 7 units.    to 359 give 8 units    to 379 give 9 units.   to 399 give 10 units.  BG >/=400 give 11 units.  insulin aspart (NOVOLOG PEN) 100 UNIT/ML pen, Inject 1-14 units per custom scale  For Pre-Meal BG less than 140, no additional insulin needed   to 159 give 1 units.    to 179 give 2 units.    to 199 give 3 units.    to 219 give 4 units.    to 239 give 5 units.    to 259 give 6 units.    to 279 give 7 units.    to 299 give 8 units.    to 319 give 9 units.    to 339 give 10 units.    to 359 give 11 units.    to 379 give 12 units.   to 399 give 13 units.  BG >/=400 give 14 units.  insulin glargine (LANTUS PEN) 100 UNIT/ML pen, Inject 24 Units Subcutaneous At Bedtime  loperamide (IMODIUM) 2 MG capsule, Take 1 capsule (2 mg) by mouth 4 times daily as needed for diarrhea  magnesium oxide (MAG-OX) 400 (241.3 Mg) MG tablet, Take 1 tablet (400 mg) by mouth 2 times daily  multivitamin w/minerals (THERA-VIT-M) tablet, Take 1 tablet by mouth daily  mycophenolate (GENERIC EQUIVALENT) 500 MG tablet, Take 1 tablet (500 mg) by mouth 2 times  "daily  pantoprazole (PROTONIX) 40 MG EC tablet, Take 1 tablet (40 mg) by mouth every morning (before breakfast)  rosuvastatin (CRESTOR) 10 MG tablet, TAKE ONE TABLET BY MOUTH ONCE DAILY  tacrolimus (GENERIC EQUIVALENT) 1 MG capsule, Take 3 mg twice a day or as directed by transplant clinic  valGANciclovir (VALCYTE) 450 MG tablet, TAKE ONE TABLET (450MG) BY MOUTH DAILY      ROS:  See HPI    EXAM:  BP (!) 140/93 (BP Location: Right arm, Patient Position: Chair, Cuff Size: Adult Regular)   Pulse 122   Ht 1.626 m (5' 4\")   Wt 79.8 kg (176 lb)   SpO2 98%   BMI 30.21 kg/m    General: appears comfortable, alert and articulate  Head: normocephalic, atraumatic  Eyes: anicteric sclera, EOMI  Neck: supple, 2+ carotids  Orophyarynx: moist mucosa, no lesions, dentition intact  Heart: regular, tachycardic, S1/S2, no murmur, gallop, rub, estimated JVP 8 cm  Lungs: clear, no rales or wheezing  Abdomen: soft, non-tender, bowel sounds present, no hepatomegaly  Extremities: no clubbing, cyanosis or edema  Neurological: normal speech and affect, no gross motor deficits    Labs - reviewed with patient in clinic today:  CBC RESULTS:  Lab Results   Component Value Date    WBC 4.4 09/30/2020    RBC 3.13 (L) 09/30/2020    HGB 8.9 (L) 09/30/2020    HCT 29.1 (L) 09/30/2020    MCV 93 09/30/2020    MCH 28.4 09/30/2020    MCHC 30.6 (L) 09/30/2020    RDW 17.6 (H) 09/30/2020     09/30/2020       CMP RESULTS:  Lab Results   Component Value Date     09/30/2020    POTASSIUM 4.6 09/30/2020    CHLORIDE 108 09/30/2020    CO2 20 09/30/2020    ANIONGAP 8 09/30/2020     (H) 09/30/2020    BUN 40 (H) 09/30/2020    CR 1.92 (H) 09/30/2020    GFRESTIMATED 38 (L) 09/30/2020    GFRESTBLACK 44 (L) 09/30/2020    ARLENE 9.2 09/30/2020    BILITOTAL 0.5 09/30/2020    ALBUMIN 3.1 (L) 09/30/2020    ALKPHOS 69 09/30/2020    ALT 23 09/30/2020    AST 19 09/30/2020        INR RESULTS:  Lab Results   Component Value Date    INR 1.06 08/26/2020 "       LIPID RESULTS:  Lab Results   Component Value Date    CHOL 113 09/01/2020    HDL 28 (L) 09/01/2020    LDL 57 09/01/2020    TRIG 143 09/01/2020       IMMUNOSUPPRESSANT LEVELS:  Lab Results   Component Value Date    TACROL 6.3 09/24/2020    DOSTAC Not Provided 09/24/2020       No components found for: CK  Lab Results   Component Value Date    MAG 1.5 (L) 09/30/2020     Lab Results   Component Value Date    A1C 7.4 (H) 08/19/2020     Lab Results   Component Value Date    PHOS 4.3 09/30/2020     Lab Results   Component Value Date    NTBNP 404 (H) 06/26/2019     Lab Results   Component Value Date    SAITESTMET [facility] 08/13/2020    SAICELL Class I 08/13/2020    [id number] Cw:15 08/13/2020    [id number] Cw:18 08/13/2020    SAIREPCOM  08/13/2020      Test performed by modified procedure. Serum heat inactivated and tested   by a modified (Lovejoy) protocol including fetal calf serum addition.   High-risk, mfi >3,000. Mod-risk, mfi 500-3,000.       Lab Results   Component Value Date    SAIITESTME [facility] 08/13/2020    SAIICELL Class II 08/13/2020    [id number] None 08/13/2020    [id number] None 08/13/2020    SAIIREPCOM  08/13/2020      Test performed by modified procedure. Serum heat inactivated and tested   by a modified (Lovejoy) protocol including fetal calf serum addition.   High-risk, mfi >3,000. Mod-risk, mfi 500-3,000.       Lab Results   Component Value Date    CSPEC Plasma 09/13/2020       Diagnostic Studies:  Echo 8/31/2020  Interpretation Summary  Global and regional left ventricular function is hyperkinetic with an EF >70%.  Right ventricular function, chamber size, wall motion, and thickness are normal.  Small collapsed IVC.  Trivial pericardial effusion is present.    RHC 9/30/2020  RA 4/3/2 mmHg  RV 22/2 mmHg  PA 20/12/15 mmHg  CWP 11/12/12 mmHg  CO (William) 5.14 L/min   CO (TD) 6.33 L/min  CI (William) 2.78 L/min/m2  CI (TD) 3.4 L/min/m2  SVR 1694 dynes  PVR 0.58 LERMA    Assessment/Plan:  Mr. Sharp is a 59 year old  male who presents today after recent hospitalization (see HPI).    # Status post OHT on 5/18/20, history of ICM  # Chronic immunosuppression  * Rejection history: none.   * Recent immunosuppression changes: MMF decreased while in hospital  * Last biopsy: today, pending  * Intolerance to medications: none     Graft function:  - Fluid status: RHC normal  - BP: elevated but just resumed amlodipine 5mg daily.  Instructed patient to continue to monitor BP and call clinic if remains consistently < 140/90   - HRs: > 80     Immunosuppression:  - Received Simulect 5/18 and 5/22  - MMF 500mg bid, reduced due to infection and low immunknow  - tacrolimus goal level 8-10.  Follow-up today's level and adjust as indicated     PPx:  - CAV: aspirin 81mg daily, crestor 10mg daily  - PCP: Bactrim stopped 5/27 due to hyperkalemia, pentamidine given 6/30, and 7/28, no further ppx needed since of  steroids  - CMV: Valcyte 450mg daily (renally dosed), x6 months (through 11/2020)  - Thrush: Nystatin QID  - Osteoporosis: calcium/vitamin D   - GI: pantoprazole 40mg daily     Serostatus: CMV D+/R-  EBV D-/R+ Toxo D-/R-     #Hypertension  Elevated today.  on amlodipine 5 mg daily and hydralazine 25 mg tid but just resumed amlodipine 5mg daily.  Instructed patient to continue to monitor BP and call clinic if remains consistently < 140/90.  Encouraged patient to continue regular aerobic exercise aiming for at least 150 minutes of moderate physical activity or 75 minutes of vigorous physical activity - or an equal combination of both - each week. and follow low-salt, heart healthy diet.       #DM Type II    Following with endocrinology      #Recent infection:  Symptoms improved.  Continue clinda and cipro.  Obtain CT on Friday, follow up with Infectious Disease next week as scheduled.  Duration of antibiotic therapy to be determined at that time.       Follow up:  in heart transplant clinic in 1 month for 5 month visit with testing per protocol.   Will be happy to see sooner if change in clinical status or new questions/concerns arise.      Jenni Ortiz MD  Section Head - Advanced Heart Failure, Transplantation and Mechanical Circulatory Support  Director - Adult Congenital and Cardiovascular Genetics Center  Associate Professor of Medicine, [facility]        Please do not hesitate to contact me if you have any questions/concerns.     Sincerely,     Jenni Ortiz MD

## 2020-09-30 NOTE — TELEPHONE ENCOUNTER
VM left with pt.  Tacrolimus level 4.7.  Goal 8-10.  Pt to increase Tacrolimus dose to 4mg in the AM and 3mg in the PM.  Recheck Tacrolimus level locally on Monday.  Encouraged pt to call back with any questions.

## 2020-09-30 NOTE — NURSING NOTE
Chief Complaint   Patient presents with     Follow Up     Return Heart Tx     Vitals were taken and medications were reconciled.    Michael Rivas CMA    12:18 PM

## 2020-10-01 ENCOUNTER — TELEPHONE (OUTPATIENT)
Dept: TRANSPLANT | Facility: CLINIC | Age: 60
End: 2020-10-01

## 2020-10-01 ENCOUNTER — VIRTUAL VISIT (OUTPATIENT)
Dept: UROLOGY | Facility: CLINIC | Age: 60
End: 2020-10-01
Payer: COMMERCIAL

## 2020-10-01 DIAGNOSIS — R97.20 ELEVATED PROSTATE SPECIFIC ANTIGEN (PSA): Primary | ICD-10-CM

## 2020-10-01 DIAGNOSIS — N41.1 PROSTATITIS, CHRONIC: ICD-10-CM

## 2020-10-01 LAB
BACTERIA SPEC CULT: NORMAL
BACTERIA SPEC CULT: NORMAL
COPATH REPORT: NORMAL
SPECIMEN SOURCE: NORMAL
SPECIMEN SOURCE: NORMAL

## 2020-10-01 PROCEDURE — 99214 OFFICE O/P EST MOD 30 MIN: CPT | Mod: 95 | Performed by: UROLOGY

## 2020-10-01 ASSESSMENT — PAIN SCALES - GENERAL: PAINLEVEL: NO PAIN (0)

## 2020-10-01 NOTE — NURSING NOTE
If you are dropped from the video visit, the video invite should be resent to: Send to e-mail at: [email or web address]

## 2020-10-01 NOTE — PROGRESS NOTES
"Mariusz Sharp is a 59 year old male who is being evaluated via a billable video visit.       The patient has been notified of following:      \"This video visit will be conducted via a call between you and your physician/provider. We have found that certain health care needs can be provided without the need for an in-person physical exam.  This service lets us provide the care you need with a video conversation.  If a prescription is necessary we can send it directly to your pharmacy.  If lab work is needed we can place an order for that and you can then stop by our lab to have the test done at a later time.     Video visits are billed at different rates depending on your insurance coverage.  Please reach out to your insurance provider with any questions.     If during the course of the call the physician/provider feels a video visit is not appropriate, you will not be charged for this service.\"     Patient has given verbal consent for Video visit? Yes  How would you like to obtain your AVS? MyChart  If you are dropped from the video visit, the video invite should be resent to: Text to cell phone: [phone]    Will anyone else be joining your video visit? No          Chief Complaint:   Elevated PSA         Consult or Referral:     Mr. Mariusz Sharp is a 59 year old male evaluated at the request of Dr. Geller.         History of Present Illness:    Mariusz Sharp is a very pleasant 59 year old male who presents with a history of Elevated PSA.  In hospital for past month. Medical history notable for heart transplant 5/18/2020. Has also seen Dr. Ibarra and Dr. Carlos in the past. Has known large renal stone. Planning to check in with Dr. Carlos again in several weeks, following recovery from his recent hospitalization. He reports no significant urinary symptoms. His PSA tests were done while he was inpatient. No hematuria or dysuria.     PSA  9/30/2020 - 11.0  8/19/2020 - 10.80  5/18/2020 - 8.21  5/15/2020 - " 8.51  3/27/2019 - 3.83         Past Medical History:     Past Medical History:   Diagnosis Date     Acute kidney injury (H)      CKD (chronic kidney disease)      Coronary artery disease      Diabetes mellitus (H)      HTN (hypertension)      Hyperlipidemia      ICD (implantable cardioverter-defibrillator), single chamber- NOT dependent 7/17/2018     Ischemic cardiomyopathy      LV (left ventricular) mural thrombus      Paroxysmal atrial flutter (H)      RVF (right ventricular failure) (H)      Systolic heart failure (H)      Ventricular tachycardia (H)             Past Surgical History:     Past Surgical History:   Procedure Laterality Date     BRONCHOSCOPY (RIGID OR FLEXIBLE), DIAGNOSTIC N/A 9/15/2020    Procedure: BRONCHOSCOPY, WITH BRONCHOALVEOLAR LAVAGE;  Surgeon: Elder Mejia MD;  Location:  GI     BRONCHOSCOPY (RIGID OR FLEXIBLE), DIAGNOSTIC N/A 9/17/2020    Procedure: BRONCHOSCOPY, WITH BRONCHOALVEOLAR LAVAGE;  Surgeon: Bill Lemus MD;  Location:  OR     BRONCHOSCOPY RIGID OR FLEXIBLE W/TRANSENDOSCOPIC ENDOBRONCHIAL ULTRASOUND GUIDED Right 9/8/2020    Procedure: BRONCHOSCOPY, WITH ENDOBRONCHIAL ULTRASOUND;  Surgeon: Donny Mercedes MD;  Location:  GI     COLONOSCOPY N/A 12/7/2018    Procedure: COLONOSCOPY;  Surgeon: Krish Mercado MD;  Location: [facility]     CV HEART BIOPSY N/A 5/24/2020    Procedure: Heart Biopsy;  Surgeon: Kit Bacon MD;  Location:  HEART CARDIAC CATH LAB     CV HEART BIOPSY N/A 6/1/2020    Procedure: CV HEART BIOPSY;  Surgeon: Kit Bacon MD;  Location:  HEART CARDIAC CATH LAB     CV HEART BIOPSY N/A 6/8/2020    Procedure: CV HEART BIOPSY;  Surgeon: Kit Bacon MD;  Location:  HEART CARDIAC CATH LAB     CV HEART BIOPSY N/A 6/15/2020    Procedure: CV HEART BIOPSY;  Surgeon: Kit Bacon MD;  Location:  HEART CARDIAC CATH LAB     CV HEART BIOPSY N/A 6/30/2020    Procedure: CV HEART BIOPSY;   Surgeon: Kit Bacon MD;  Location:  HEART CARDIAC CATH LAB     CV HEART BIOPSY N/A 7/14/2020    Procedure: CV HEART BIOPSY;  Surgeon: Brian Long MD;  Location:  HEART CARDIAC CATH LAB     CV HEART BIOPSY N/A 7/29/2020    Procedure: CV HEART BIOPSY;  Surgeon: Momo Hunt MD;  Location:  HEART CARDIAC CATH LAB     CV HEART BIOPSY N/A 8/13/2020    Procedure: CV HEART BIOPSY;  Surgeon: Khris Mcclelland MD;  Location:  HEART CARDIAC CATH LAB     CV HEART BIOPSY N/A 8/26/2020    Procedure: CV HEART BIOPSY;  Surgeon: Momo Hunt MD;  Location:  HEART CARDIAC CATH LAB     CV RIGHT HEART CATH N/A 2/19/2019    Procedure: RHC;  Surgeon: Brian Long MD;  Location:  HEART CARDIAC CATH LAB     CV RIGHT HEART CATH N/A 7/5/2019    Procedure: CV RIGHT HEART CATH;  Surgeon: Khris Mcclelland MD;  Location:  HEART CARDIAC CATH LAB     CV RIGHT HEART CATH N/A 5/24/2020    Procedure: Right Heart Cath;  Surgeon: Kit Bacon MD;  Location:  HEART CARDIAC CATH LAB     CV RIGHT HEART CATH N/A 6/1/2020    Procedure: CV RIGHT HEART CATH;  Surgeon: Kit Bacon MD;  Location:  HEART CARDIAC CATH LAB     CV RIGHT HEART CATH N/A 6/8/2020    Procedure: CV RIGHT HEART CATH;  Surgeon: Kit Bacon MD;  Location:  HEART CARDIAC CATH LAB     CV RIGHT HEART CATH N/A 6/15/2020    Procedure: CV RIGHT HEART CATH;  Surgeon: Kit Bacon MD;  Location:  HEART CARDIAC CATH LAB     CV RIGHT HEART CATH N/A 6/30/2020    Procedure: CV RIGHT HEART CATH;  Surgeon: Kit Bacon MD;  Location:  HEART CARDIAC CATH LAB     CV RIGHT HEART CATH N/A 7/14/2020    Procedure: CV RIGHT HEART CATH;  Surgeon: Brian Long MD;  Location: [facility] CARDIAC CATH LAB     CV RIGHT HEART CATH N/A 7/29/2020    Procedure: CV RIGHT HEART CATH;  Surgeon: Momo Hunt MD;  Location: [facility]  CARDIAC CATH LAB     CV RIGHT HEART CATH N/A 8/13/2020    Procedure: CV RIGHT HEART CATH;  Surgeon: Khris Mcclelland MD;  Location:  HEART CARDIAC CATH LAB     CV RIGHT HEART CATH N/A 8/26/2020    Procedure: CV RIGHT HEART CATH;  Surgeon: Momo Hunt MD;  Location:  HEART CARDIAC CATH LAB     ENDOBRONCHIAL ULTRASOUND FLEXIBLE N/A 9/17/2020    Procedure: BRONCHOSCOPY, flexible, endobronchial ultrasound with transbronchial biopsies, endobronchial biopsies;  Surgeon: Bill Lemus MD;  [facility]     HC PRQ TRLUML CORONARY ANGIOPLASTY ADDL BRANCH      PCI and ALYSSA to ostial LAD on 6/27/18     IMPLANT AUTOMATIC IMPLANTABLE CARDIOVERTER DEFIBRILLATOR       INSERT VENTRICULAR ASSIST DEVICE LEFT (HEARTMATE II) N/A 12/31/2018    Procedure: Median Sternotomy, On Cardiopulmonary Bypass Pump, Insertion of Left Ventricular Assist Device (Heartmate III);  Surgeon: Kamaljit Baker MD;  [facility]     PICC DOUBLE LUMEN PLACEMENT Right 05/22/2020    5FR DL PICC inserted at right basilic vein, length 38cm.     TRANSPLANT HEART RECIPIENT AFTER HEART MATE N/A 5/18/2020    Procedure: REDO MEDIAN STERNOTOMY, LYSIS OF ADHESIONS, ON CARDIOPULMONARY BYPASS PUMP, HEART TRANSPLANT RECIPIENT, REMOVAL OF LEFT VENTRICULAR ASSIST DEVICE, REMOVAL OF AUTOMATED IMPLANTABLE CARDIOVERTER DEFIBRILLATOR;  Surgeon: Elder Willis MD;  [facility]            Medications     Current Outpatient Medications   Medication     ACCU-CHEK [place] PLUS test strip     acetaminophen (TYLENOL) 325 MG tablet     amLODIPine (NORVASC) 5 MG tablet     BD SILVANA U/F 32G X 4 MM insulin pen needle     calcium carbonate 600 mg-vitamin D 400 units (CALTRATE) 600-400 MG-UNIT per tablet     ciprofloxacin (CIPRO) 500 MG tablet     clindamycin (CLEOCIN) 150 MG capsule     Continuous Blood Gluc  (DEXCOM G6 ) DON     Continuous Blood Gluc Sensor (DEXCOM G6 SENSOR) MISC     Continuous Blood Gluc Transmit (DEXCOM G6 TRANSMITTER)  MISC     hydrALAZINE (APRESOLINE) 25 MG tablet     insulin aspart (NOVOLOG PEN) 100 UNIT/ML pen     insulin aspart (NOVOLOG PEN) 100 UNIT/ML pen     insulin aspart (NOVOLOG PEN) 100 UNIT/ML pen     insulin glargine (LANTUS PEN) 100 UNIT/ML pen     loperamide (IMODIUM) 2 MG capsule     magnesium oxide (MAG-OX) 400 (241.3 Mg) MG tablet     multivitamin w/minerals (THERA-VIT-M) tablet     mycophenolate (GENERIC EQUIVALENT) 500 MG tablet     pantoprazole (PROTONIX) 40 MG EC tablet     rosuvastatin (CRESTOR) 10 MG tablet     tacrolimus (GENERIC EQUIVALENT) 1 MG capsule     valGANciclovir (VALCYTE) 450 MG tablet     No current facility-administered medications for this visit.             Family History:   No family history on file.         Social History:     Social History     Socioeconomic History     Marital status: Single     Spouse name: Not on file     Number of children: Not on file     Years of education: Not on file     Highest education level: Not on file   Occupational History     Not on file   Social Needs     Financial resource strain: Not on file     Food insecurity     Worry: Not on file     Inability: Not on file     Transportation needs     Medical: Not on file     Non-medical: Not on file   Tobacco Use     Smoking status: Never Smoker     Smokeless tobacco: Never Used   Substance and Sexual Activity     Alcohol use: No     Frequency: Never     Drug use: No     Sexual activity: Not on file   Lifestyle     Physical activity     Days per week: Not on file     Minutes per session: Not on file     Stress: Not on file   Relationships     Social connections     Talks on phone: Not on file     Gets together: Not on file     Attends Restorationist service: Not on file     Active member of club or organization: Not on file     Attends meetings of clubs or organizations: Not on file     Relationship status: Not on file     Intimate partner violence     Fear of current or ex partner: Not on file     Emotionally abused:  Not on file     Physically abused: Not on file     Forced sexual activity: Not on file   Other Topics Concern     Parent/sibling w/ CABG, MI or angioplasty before 65F 55M? No   Social History Narrative     Not on file            Allergies:   Patient has no known allergies.         Review of Systems:  From intake questionnaire     Skin: negative  Eyes: negative  Ears/Nose/Throat: negative  Respiratory: No shortness of breath, dyspnea on exertion, cough, or hemoptysis  Cardiovascular: No chest pain or palpitations  Gastrointestinal: negative; no nausea/vomiting, constipation or diarrhea  Genitourinary: as per HPI  Musculoskeletal: negative  Neurologic: negative  Psychiatric: negative  Hematologic/Lymphatic/Immunologic: negative  Endocrine: negative         Physical Exam:     Patient is a 59 year old  male   Vitals: There were no vitals taken for this visit.  Constitutional: There is no height or weight on file to calculate BMI.  Alert, no acute distress, oriented, conversant  Eyes: no scleral icterus; extraocular muscles intact  Respiratory: no respiratory distress, or pursed lip breathing  Musculoskeletal: normal range of motion  Skin: no notable lesions visible  Neuro: Alert, oriented, speech and mentation normal  Psych: affect and mood normal, alert and oriented to person, place and time      Labs and Pathology:    I reviewed all applicable laboratory and pathology data and went over findings with patient  Significant for   Lab Results   Component Value Date    CR 1.92 09/30/2020    CR 1.70 09/24/2020    CR 1.72 09/23/2020    CR 1.76 09/22/2020    CR 1.86 09/21/2020    CR 2.29 09/20/2020    CR 2.94 09/19/2020    CR 3.45 09/18/2020    CR 3.50 09/18/2020    CR 3.35 09/17/2020     PSA   Date Value Ref Range Status   09/30/2020 11.00 (H) 0 - 4 ug/L Final     Comment:     Assay Method:  Chemiluminescence using Siemens Vista analyzer   08/19/2020 10.80 (H) 0 - 4 ug/L Final     Comment:     Assay Method:   Chemiluminescence using Siemens Vista analyzer   05/18/2020 8.21 (H) 0 - 4 ug/L Final     Comment:     Assay Method:  Chemiluminescence using Siemens Vista analyzer   05/15/2020 8.51 (H) 0 - 4 ug/L Final     Comment:     Assay Method:  Chemiluminescence using Siemens Vista analyzer   03/27/2019 3.83 0 - 4 ug/L Final     Comment:     Assay Method:  Chemiluminescence using Siemens Vista analyzer   08/06/2018 4.48 (H) 0 - 4 ug/L Final     Comment:     Assay Method:  Chemiluminescence using Siemens Vista analyzer       Imaging:    I directly visualized and reviewed all applicable imaging a with patient.    CT abd/pelvis 6/2/2020  IMPRESSION:     1. Grossly unchanged 2.0 x 2.8 cm stone in the left renal pelvis  associated with mild left-sided pelvocaliectasis. Right kidney is  unremarkable.  2. Surgical incision in the right mid hemiabdomen with air foci  tracking along the right abdominal and lower chest wall, likely  related to patient's recent surgery.      Outside and Past Medical records:    I spent 10 minutes reviewing outside and past medical records.         Assessment and Plan:     Assessment: 59 year old male with history of heart transplant in May 2020. During hospitalizations, found to have elevated PSA, 11.0 most recetnly. Also has large renal stone for which he follows with Dr. Carlos.  We had a long discussion about the utility of PSA screening.  We talked about the Pros and Cons.  We discussed the findings of the PLCO and EORTC studies.  We discussed the results of the [place] trial of treatment vs. observation in clinically detected prostate cancer as well as the results of the PIVOT trial in the context of screen-detected cancer.  We discussed the recommendations by the AUA, and USPSTF. We also discussed the significant (2-6%) and rising risk of biopsy associated sepsis.    We also discussed the emerging role of MRI in the diagnosis of prostate cancer and the potential for performing  MRI-Ultrasound Fusion biopsy if suspicious lesions are noted.    At this point, in the context of his heart transplant and recent hospitalization, the risks of further prostate cancer workup outweight the benefits. As such, would recommend recheck PSA in 3 months, ideally when he is not in the hospital. He is in agreement with this plan.    Plan:  PSA in 3 months - virtual visit  Also following with Dr. Carlos regarding renal stone    Orders  Orders Placed This Encounter   Procedures     PSA total and free       Video-Visit Details    Type of service:  Video Visit    Video Start Time: 4:30 PM  Video End Time: 4:55 PM    Originating Location (pt. Location): Home    Distant Location (provider location):  [facility] UROLOGY AND [facility] FOR PROSTATE AND UROLOGIC CANCERS    Platform used for Video Visit: Geremias Gomes MD  Urology  [facility]

## 2020-10-01 NOTE — LETTER
"10/1/2020       RE: Mariusz Sharp  [address] [facility] [address]     Dear Colleague,    Thank you for referring your patient, Mariusz Sharp, to the [facility]OLOG[facility] [place] at [facility]. Please see a copy of my visit note below.    Mariusz Sharp is a 59 year old male who is being evaluated via a billable video visit.       The patient has been notified of following:      \"This video visit will be conducted via a call between you and your physician/provider. We have found that certain health care needs can be provided without the need for an in-person physical exam.  This service lets us provide the care you need with a video conversation.  If a prescription is necessary we can send it directly to your pharmacy.  If lab work is needed we can place an order for that and you can then stop by our lab to have the test done at a later time.     Video visits are billed at different rates depending on your insurance coverage.  Please reach out to your insurance provider with any questions.     If during the course of the call the physician/provider feels a video visit is not appropriate, you will not be charged for this service.\"     Patient has given verbal consent for Video visit? Yes  How would you like to obtain your AVS? MyChart  If you are dropped from the video visit, the video invite should be resent to: Text to cell phone: [phone]    Will anyone else be joining your video visit? No          Chief Complaint:   Elevated PSA         Consult or Referral:     Mr. Mariusz Sharp is a 59 year old male evaluated at the request of Dr. Geller.         History of Present Illness:    Mariusz Sharp is a very pleasant 59 year old male who presents with a history of Elevated PSA.  In hospital for past month. Medical history notable for heart transplant 5/18/2020. Has also seen Dr. Ibarra and Dr. Carlos in the past. Has known large renal stone. Planning " to check in with Dr. Carlos again in several weeks, following recovery from his recent hospitalization. He reports no significant urinary symptoms. His PSA tests were done while he was inpatient. No hematuria or dysuria.     PSA  9/30/2020 - 11.0  8/19/2020 - 10.80  5/18/2020 - 8.21  5/15/2020 - 8.51  3/27/2019 - 3.83         Past Medical History:     Past Medical History:   Diagnosis Date     Acute kidney injury (H)      CKD (chronic kidney disease)      Coronary artery disease      Diabetes mellitus (H)      HTN (hypertension)      Hyperlipidemia      ICD (implantable cardioverter-defibrillator), single chamber- NOT dependent 7/17/2018     Ischemic cardiomyopathy      LV (left ventricular) mural thrombus      Paroxysmal atrial flutter (H)      RVF (right ventricular failure) (H)      Systolic heart failure (H)      Ventricular tachycardia (H)             Past Surgical History:     Past Surgical History:   Procedure Laterality Date     BRONCHOSCOPY (RIGID OR FLEXIBLE), DIAGNOSTIC N/A 9/15/2020    Procedure: BRONCHOSCOPY, WITH BRONCHOALVEOLAR LAVAGE;  Surgeon: Elder Mejia MD;  Location:  GI     BRONCHOSCOPY (RIGID OR FLEXIBLE), DIAGNOSTIC N/A 9/17/2020    Procedure: BRONCHOSCOPY, WITH BRONCHOALVEOLAR LAVAGE;  Surgeon: Bill Lemus MD;  [facility]     BRONCHOSCOPY RIGID OR FLEXIBLE W/TRANSENDOSCOPIC ENDOBRONCHIAL ULTRASOUND GUIDED Right 9/8/2020    Procedure: BRONCHOSCOPY, WITH ENDOBRONCHIAL ULTRASOUND;  Surgeon: Donny Mercedes MD;  Location: [facility] GI     COLONOSCOPY N/A 12/7/2018    Procedure: COLONOSCOPY;  Surgeon: Krish Mercado MD;  Location: [facility]     CV HEART BIOPSY N/A 5/24/2020    Procedure: Heart Biopsy;  Surgeon: Kit Bacon MD;  Location:  HEART CARDIAC CATH LAB     CV HEART BIOPSY N/A 6/1/2020    Procedure: CV HEART BIOPSY;  Surgeon: Kit Bacon MD;  Location:  HEART CARDIAC CATH LAB     CV HEART BIOPSY N/A 6/8/2020    Procedure: CV HEART  BIOPSY;  Surgeon: Kit Bacon MD;  [facility]     CV HEART BIOPSY N/A 6/15/2020    Procedure: CV HEART BIOPSY;  Surgeon: Kit Bacon MD;  [facility]     CV HEART BIOPSY N/A 6/30/2020    Procedure: CV HEART BIOPSY;  Surgeon: Kit Bacon MD;  [facility]     CV HEART BIOPSY N/A 7/14/2020    Procedure: CV HEART BIOPSY;  Surgeon: Brian Long MD;  [facility]     CV HEART BIOPSY N/A 7/29/2020    Procedure: CV HEART BIOPSY;  Surgeon: Momo Hunt MD;  [facility]     CV HEART BIOPSY N/A 8/13/2020    Procedure: CV HEART BIOPSY;  Surgeon: Khris Mcclelland MD;  [facility]     CV HEART BIOPSY N/A 8/26/2020    Procedure: CV HEART BIOPSY;  Surgeon: Momo Hunt MD;  [facility]     CV RIGHT HEART CATH N/A 2/19/2019    Procedure: RHC;  Surgeon: Brian Long MD;  [facility]     CV RIGHT HEART CATH N/A 7/5/2019    Procedure: CV RIGHT HEART CATH;  Surgeon: Khris Mcclelland MD;  [facility]     CV RIGHT HEART CATH N/A 5/24/2020    Procedure: Right Heart Cath;  Surgeon: Kit Bacon MD;  [facility]     CV RIGHT HEART CATH N/A 6/1/2020    Procedure: CV RIGHT HEART CATH;  Surgeon: Kit Bacon MD;  [facility]     CV RIGHT HEART CATH N/A 6/8/2020    Procedure: CV RIGHT HEART CATH;  Surgeon: Kit Bacon MD;  [facility]     CV RIGHT HEART CATH N/A 6/15/2020    Procedure: CV RIGHT HEART CATH;  Surgeon: Kit Bacon MD;  Location:  HEART CARDIAC CATH LAB     CV RIGHT HEART CATH N/A 6/30/2020    Procedure: CV RIGHT HEART CATH;  Surgeon: Kit Bacon MD;  [facility]ART [facility]ARD[facility]     CV RIGHT HEART CATH N/A 7/14/2020    Procedure: CV RIGHT HEART CATH;  Surgeon: Brian Long MD;  Location:  HEART CARDIAC CATH LAB     CV RIGHT HEART CATH N/A 7/29/2020    Procedure: CV RIGHT HEART CATH;  Surgeon: Momo Hunt MD;  Location:  HEART CARDIAC CATH LAB     CV RIGHT HEART CATH N/A 8/13/2020    Procedure: CV RIGHT HEART CATH;  Surgeon: Khris Mcclelland MD;  Location:  HEART CARDIAC CATH LAB     CV RIGHT HEART CATH N/A 8/26/2020    Procedure: CV RIGHT HEART CATH;  Surgeon: Momo Hunt MD;  Location:  HEART CARDIAC CATH LAB     ENDOBRONCHIAL ULTRASOUND FLEXIBLE N/A 9/17/2020    Procedure: BRONCHOSCOPY, flexible, endobronchial ultrasound with transbronchial biopsies, endobronchial biopsies;  Surgeon: Bill Lemus MD;  Location[facility]      PRQ TRLUML CORONARY ANGIOPLASTY ADDL BRANCH      PCI and ALYSSA to ostial LAD on 6/27/18     IMPLANT AUTOMATIC IMPLANTABLE CARDIOVERTER DEFIBRILLATOR       INSERT VENTRICULAR ASSIST DEVICE LEFT (HEARTMATE II) N/A 12/31/2018    Procedure: Median Sternotomy, On Cardiopulmonary Bypass Pump, Insertion of Left Ventricular Assist Device (Heartmate III);  Surgeon: Kamaljit Baker MD;  Location[facility]     PICC DOUBLE LUMEN PLACEMENT Right 05/22/2020    5FR DL PICC inserted at right basilic vein, length 38cm.     TRANSPLANT HEART RECIPIENT AFTER HEART MATE N/A 5/18/2020    Procedure: REDO MEDIAN STERNOTOMY, LYSIS OF ADHESIONS, ON CARDIOPULMONARY BYPASS PUMP, HEART TRANSPLANT RECIPIENT, REMOVAL OF LEFT VENTRICULAR ASSIST DEVICE, REMOVAL OF AUTOMATED IMPLANTABLE CARDIOVERTER DEFIBRILLATOR;  Surgeon: Elder Willis MD;  [facility]            Medications     Current Outpatient Medications   Medication     ACCU-CHEK [place] PLUS test strip     acetaminophen (TYLENOL) 325 MG tablet     amLODIPine (NORVASC) 5 MG tablet     BD SILVANA U/F 32G X 4 MM insulin pen needle     calcium carbonate 600 mg-vitamin D 400 units  (CALTRATE) 600-400 MG-UNIT per tablet     ciprofloxacin (CIPRO) 500 MG tablet     clindamycin (CLEOCIN) 150 MG capsule     Continuous Blood Gluc  (DEXCOM G6 ) DON     Continuous Blood Gluc Sensor (DEXCOM G6 SENSOR) MISC     Continuous Blood Gluc Transmit (DEXCOM G6 TRANSMITTER) MISC     hydrALAZINE (APRESOLINE) 25 MG tablet     insulin aspart (NOVOLOG PEN) 100 UNIT/ML pen     insulin aspart (NOVOLOG PEN) 100 UNIT/ML pen     insulin aspart (NOVOLOG PEN) 100 UNIT/ML pen     insulin glargine (LANTUS PEN) 100 UNIT/ML pen     loperamide (IMODIUM) 2 MG capsule     magnesium oxide (MAG-OX) 400 (241.3 Mg) MG tablet     multivitamin w/minerals (THERA-VIT-M) tablet     mycophenolate (GENERIC EQUIVALENT) 500 MG tablet     pantoprazole (PROTONIX) 40 MG EC tablet     rosuvastatin (CRESTOR) 10 MG tablet     tacrolimus (GENERIC EQUIVALENT) 1 MG capsule     valGANciclovir (VALCYTE) 450 MG tablet     No current facility-administered medications for this visit.             Family History:   No family history on file.         Social History:     Social History     Socioeconomic History     Marital status: Single     Spouse name: Not on file     Number of children: Not on file     Years of education: Not on file     Highest education level: Not on file   Occupational History     Not on file   Social Needs     Financial resource strain: Not on file     Food insecurity     Worry: Not on file     Inability: Not on file     Transportation needs     Medical: Not on file     Non-medical: Not on file   Tobacco Use     Smoking status: Never Smoker     Smokeless tobacco: Never Used   Substance and Sexual Activity     Alcohol use: No     Frequency: Never     Drug use: No     Sexual activity: Not on file   Lifestyle     Physical activity     Days per week: Not on file     Minutes per session: Not on file     Stress: Not on file   Relationships     Social connections     Talks on phone: Not on file     Gets together: Not on file      Attends [employer] service: Not on file     Active member of club or organization: Not on file     Attends meetings of clubs or organizations: Not on file     Relationship status: Not on file     Intimate partner violence     Fear of current or ex partner: Not on file     Emotionally abused: Not on file     Physically abused: Not on file     Forced sexual activity: Not on file   Other Topics Concern     Parent/sibling w/ CABG, MI or angioplasty before 65F 55M? No   Social History Narrative     Not on file            Allergies:   Patient has no known allergies.         Review of Systems:  From intake questionnaire     Skin: negative  Eyes: negative  Ears/Nose/Throat: negative  Respiratory: No shortness of breath, dyspnea on exertion, cough, or hemoptysis  Cardiovascular: No chest pain or palpitations  Gastrointestinal: negative; no nausea/vomiting, constipation or diarrhea  Genitourinary: as per HPI  Musculoskeletal: negative  Neurologic: negative  Psychiatric: negative  Hematologic/Lymphatic/Immunologic: negative  Endocrine: negative         Physical Exam:     Patient is a 59 year old  male   Vitals: There were no vitals taken for this visit.  Constitutional: There is no height or weight on file to calculate BMI.  Alert, no acute distress, oriented, conversant  Eyes: no scleral icterus; extraocular muscles intact  Respiratory: no respiratory distress, or pursed lip breathing  Musculoskeletal: normal range of motion  Skin: no notable lesions visible  Neuro: Alert, oriented, speech and mentation normal  Psych: affect and mood normal, alert and oriented to person, place and time      Labs and Pathology:    I reviewed all applicable laboratory and pathology data and went over findings with patient  Significant for   Lab Results   Component Value Date    CR 1.92 09/30/2020    CR 1.70 09/24/2020    CR 1.72 09/23/2020    CR 1.76 09/22/2020    CR 1.86 09/21/2020    CR 2.29 09/20/2020    CR 2.94 09/19/2020    CR 3.45  09/18/2020    CR 3.50 09/18/2020    CR 3.35 09/17/2020     PSA   Date Value Ref Range Status   09/30/2020 11.00 (H) 0 - 4 ug/L Final     Comment:     Assay Method:  Chemiluminescence using Siemens Vista analyzer   08/19/2020 10.80 (H) 0 - 4 ug/L Final     Comment:     Assay Method:  Chemiluminescence using Siemens Vista analyzer   05/18/2020 8.21 (H) 0 - 4 ug/L Final     Comment:     Assay Method:  Chemiluminescence using Siemens Vista analyzer   05/15/2020 8.51 (H) 0 - 4 ug/L Final     Comment:     Assay Method:  Chemiluminescence using Siemens Vista analyzer   03/27/2019 3.83 0 - 4 ug/L Final     Comment:     Assay Method:  Chemiluminescence using Siemens Vista analyzer   08/06/2018 4.48 (H) 0 - 4 ug/L Final     Comment:     Assay Method:  Chemiluminescence using Siemens Vista analyzer       Imaging:    I directly visualized and reviewed all applicable imaging a with patient.    CT abd/pelvis 6/2/2020  IMPRESSION:     1. Grossly unchanged 2.0 x 2.8 cm stone in the left renal pelvis  associated with mild left-sided pelvocaliectasis. Right kidney is  unremarkable.  2. Surgical incision in the right mid hemiabdomen with air foci  tracking along the right abdominal and lower chest wall, likely  related to patient's recent surgery.      Outside and Past Medical records:    I spent 10 minutes reviewing outside and past medical records.         Assessment and Plan:     Assessment: 59 year old male with history of heart transplant in May 2020. During hospitalizations, found to have elevated PSA, 11.0 most recetnly. Also has large renal stone for which he follows with Dr. Carlos.  We had a long discussion about the utility of PSA screening.  We talked about the Pros and Cons.  We discussed the findings of the PLCO and EORTC studies.  We discussed the results of the [place] trial of treatment vs. observation in clinically detected prostate cancer as well as the results of the PIVOT trial in the context of  screen-detected cancer.  We discussed the recommendations by the AUA, and USPSTF. We also discussed the significant (2-6%) and rising risk of biopsy associated sepsis.    We also discussed the emerging role of MRI in the diagnosis of prostate cancer and the potential for performing MRI-Ultrasound Fusion biopsy if suspicious lesions are noted.    At this point, in the context of his heart transplant and recent hospitalization, the risks of further prostate cancer workup outweight the benefits. As such, would recommend recheck PSA in 3 months, ideally when he is not in the hospital. He is in agreement with this plan.    Plan:  PSA in 3 months - virtual visit  Also following with Dr. Carols regarding renal stone    Orders  Orders Placed This Encounter   Procedures     PSA total and free       Video-Visit Details    Type of service:  Video Visit    Video Start Time: 4:30 PM  Video End Time: 4:55 PM    Originating Location (pt. Location): Home    Distant Location (provider location):  [facility] UROLOGY AND [facility] FOR PROSTATE AND UROLOGIC CANCERS    Platform used for Video Visit: Geremias Gomes MD  Urology  [facility]

## 2020-10-01 NOTE — TELEPHONE ENCOUNTER
Pt called with negative heart biopsy results.  Pt did not receive voicemail from writer yesterday.  Reviewed the following with patient- Tacrolimus level 4.7.  Goal 8-10.  Pt to increase Tacrolimus dose to 4mg in the AM and 3mg in the PM.  Recheck a level Monday in [place].  Pt states understanding instructions.

## 2020-10-02 ENCOUNTER — HOSPITAL ENCOUNTER (OUTPATIENT)
Dept: CT IMAGING | Facility: CLINIC | Age: 60
Discharge: HOME | End: 2020-10-02
Attending: NURSE PRACTITIONER | Admitting: NURSE PRACTITIONER
Payer: COMMERCIAL

## 2020-10-02 DIAGNOSIS — R91.8 LUNG MASS: ICD-10-CM

## 2020-10-02 LAB
ALLOMAP SCORE (EXTERNAL): 37
IMMUKNOW IMMUNE CELL FUNCTION: 147 NG/ML
NEGATIVE PREDICTIVE VALUE PERCENT (EXTERNAL): 98.1 %
POSITIVE PREDICTIVE VALUE PERCENT (EXTERNAL): 9.5 %

## 2020-10-02 PROCEDURE — 250N000011 HC RX IP 250 OP 636: Performed by: RADIOLOGY

## 2020-10-02 PROCEDURE — 250N000009 HC RX 250: Performed by: RADIOLOGY

## 2020-10-02 PROCEDURE — 71260 CT THORAX DX C+: CPT

## 2020-10-02 RX ORDER — IOPAMIDOL 755 MG/ML
80 INJECTION, SOLUTION INTRAVASCULAR ONCE
Status: COMPLETED | OUTPATIENT
Start: 2020-10-02 | End: 2020-10-02

## 2020-10-02 RX ADMIN — IOPAMIDOL 80 ML: 755 INJECTION, SOLUTION INTRAVENOUS at 13:33

## 2020-10-02 RX ADMIN — SODIUM CHLORIDE 80 ML: 9 INJECTION, SOLUTION INTRAVENOUS at 13:33

## 2020-10-02 NOTE — PATIENT INSTRUCTIONS
Please follow up in 3 month for a virtual visit with a PSA before      It was a pleasure meeting with you today.  Thank you for allowing me and my team the privilege of caring for you today.  YOU are the reason we are here, and I truly hope we provided you with the excellent service you deserve.  Please let us know if there is anything else we can do for you so that we can be sure you are leaving completely satisfied with your care experience.

## 2020-10-05 ENCOUNTER — TELEPHONE (OUTPATIENT)
Dept: TRANSPLANT | Facility: CLINIC | Age: 60
End: 2020-10-05

## 2020-10-05 NOTE — TELEPHONE ENCOUNTER
"Returned call to pt regarding labs.  [facility] state they did not have orders, however they were sent on 9/30.  [facility] called, they state their fax has been \"down\" for a awhile.  New fax number received, and orders resent.  Pt will have labs drawn on Wed.  "

## 2020-10-05 NOTE — TELEPHONE ENCOUNTER
Patient Call: Voicemail  Date/Time: [id number] 10:07 am  Reason for call: Patient went for labs today and there were no orders. Please call him at [phone]

## 2020-10-06 ENCOUNTER — DOCUMENTATION ONLY (OUTPATIENT)
Dept: OTHER | Facility: CLINIC | Age: 60
End: 2020-10-06

## 2020-10-06 ENCOUNTER — VIRTUAL VISIT (OUTPATIENT)
Dept: INFECTIOUS DISEASES | Facility: CLINIC | Age: 60
End: 2020-10-06
Attending: INTERNAL MEDICINE
Payer: COMMERCIAL

## 2020-10-06 DIAGNOSIS — Z94.1 HEART REPLACED BY TRANSPLANT (H): ICD-10-CM

## 2020-10-06 DIAGNOSIS — J69.0 ASPIRATION PNEUMONIA OF RIGHT MIDDLE LOBE, UNSPECIFIED ASPIRATION PNEUMONIA TYPE (H): Primary | ICD-10-CM

## 2020-10-06 DIAGNOSIS — J18.9 POSTOBSTRUCTIVE PNEUMONIA: ICD-10-CM

## 2020-10-06 DIAGNOSIS — R91.8 MASS OF UPPER LOBE OF RIGHT LUNG: ICD-10-CM

## 2020-10-06 LAB
BACTERIA SPEC CULT: NORMAL
FUNGUS SPEC CULT: NORMAL
FUNGUS SPEC CULT: NORMAL
SPECIMEN SOURCE: NORMAL

## 2020-10-06 PROCEDURE — 99213 OFFICE O/P EST LOW 20 MIN: CPT | Mod: 95 | Performed by: INTERNAL MEDICINE

## 2020-10-06 NOTE — PROGRESS NOTES
"Mariusz Sharp is a 59 year old male who is being evaluated via a billable video visit.      The patient has been notified of following:     \"This video visit will be conducted via a call between you and your physician/provider. We have found that certain health care needs can be provided without the need for an in-person physical exam.  This service lets us provide the care you need with a video conversation.  If a prescription is necessary we can send it directly to your pharmacy.  If lab work is needed we can place an order for that and you can then stop by our lab to have the test done at a later time.    Video visits are billed at different rates depending on your insurance coverage.  Please reach out to your insurance provider with any questions.    If during the course of the call the physician/provider feels a video visit is not appropriate, you will not be charged for this service.\"    Patient has given verbal consent for Video visit? Yes  How would you like to obtain your AVS? MyChart  If you are dropped from the video visit, the video invite should be resent to: Text to cell phone: [phone]   Will anyone else be joining your video visit? No        Video-Visit Details    Type of service:  Video Visit    Video Start Time: 4:30 PM  Video End Time: 4:54 PM    Originating Location (pt. Location): Home    Distant Location (provider location):  [facility] [place]     Platform used for Video Visit: Lindsey Rodriguez MD          [facility]    Transplant Infectious Diseases Outpatient Progress note      Patient:  Mariusz Sharp, Date of birth 1960, Medical record number [id number]  Date of Visit:  10/06/2020         Recommendations:   1. Discontinue cipro and clindamycin as planned on 10/11/2020.   2. CT chest next time you're in town 10/29/2020.   - further recommendations depends on symptoms and CT findings.     RTC: 1-2 months, " can be virtual.         Summary of Presentation:   Transplants:  5/18/2020 (Heart), Postoperative day:  141     This patient is a 59 year old male with ICMP s/p OHT basiliximab for induction, TAC/MMF for maintenance IS.   Recently discharged after hospitalization for cough, fever, shortness of breath and post tussive LOC. Treated for RUL pneumonia.         Active Problems and Infectious Diseases Issues:   1. Likely aspiration pneumonia vs postobstructive pneumonia  The patient responded to IV zosyn while he was in the hospital with resolution of fever and improvement of chest CT. His clinical and radiological findings did not improve with posaconazole.   The patient is finishing a total of 4 weeks of ABx. Still with cough but no fever or chills. CT is slowly improving but still with right hilar infiltration.      The BALs on 9/8/2020, 9/15/2020 and 9/17/2020 were all unremarkable except for the growth of Abiotrophia defectiva (oral simona).   The TBBx were all non-diagnostic/unremarkable.     Will continue current ABx to treat for possible aspiration pneumonia for total of 4 weeks, last day 10/11/2020. Will repeat CT next time he's in town for the right heart cath.      2. The patient was being treated for chronic prostatitis with cefdinir.   While on cipro, there would be no need resume cefdinir.   He would have finished the planned 6 week therapy on 10/1/2020, however, he remains on cipro till 10/6/2020 for the aspiration pneumonia, so overall he received 6-8 weeks of therapy.          Old Problems and Infectious Diseases Issues:   1. 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   2. C acnes in the extracted VAD; treated with vancomycin then doxycycline.     Other Infectious Disease issues include:  - QTc 475 as of 9/16/2020.   - PCP prophylaxis: bactrim caused hyperkalemia, then pentamidine. Now off PJP ppx.   - Serostatus: CMV D+/R-, EBV D-/R+, HSV1?/2?, VZV +,  Toxo D-/R-     On VGCV ppx per protocol.   - Gamma globulin status: 510 as of 9/13/2020       Attestation:  I interviewed the patient and obtained history from the patient, and by reviewing the patient's chart including outside records, microbiological data, and radiological data. All data are summarized in this notes.  Tavares Rodriguez MD   Pager: [phone]  10/06/2020         Interim History:   No more fever or LOC with cough, but the cough has not really improved. It's still constant during the day. The cough does not awaken the patient. The cough is sometimes productive.   No N/V. The weight is stable.         HPI:       Transplants:  5/18/2020 (Heart); Postoperative day:  141  In summary:  Presented to the hospital with 24 hr of dyspnea, fever, and cough with post tussive LOC.   He was found to have RUL consolidation vs obstructive LN vs mass.   Non-invasive workup with urine and blood Histo and Blasto Ag were negative, A galactomannan negative, BD glucan low positive at 86 and 90.  He was treated with posaconazole empirically from 9/4/2020 till 9/13/202 when he spiked fever and CT showed worsening of the mass/consolidation. Zosyn was initiated on 9/13/2020 for possible post obstructive pneumonia with resolution of fever and posaconazole was discontinued due to lack of effectiveness.   Bronchoscopy with TBBx on 9/8/2020 and 9/15/2020 were both not diagnostic. FNA on 9/17/202 and BAL the same day were also not significant.   The biopsy from 9/8/2020 was also sent for NGS by PCR of 28S DNA and was negative for fungal infection.   EBV was not detected.     Given the significant improvement with zosyn, it was believed that the RUL infiltrate was due to aspiration or postobstructive pneumonia. He was discharged on cipro and clindamycin PO till 10/11/2020 for total of 4 weeks.            Past Medical History:     Past Medical History:   Diagnosis Date     Acute kidney injury (H)      CKD (chronic kidney disease)       Coronary artery disease      Diabetes mellitus (H)      HTN (hypertension)      Hyperlipidemia      ICD (implantable cardioverter-defibrillator), single chamber- NOT dependent 7/17/2018     Ischemic cardiomyopathy      LV (left ventricular) mural thrombus      Paroxysmal atrial flutter (H)      RVF (right ventricular failure) (H)      Systolic heart failure (H)      Ventricular tachycardia (H)              Past Surgical History:     Past Surgical History:   Procedure Laterality Date     BRONCHOSCOPY (RIGID OR FLEXIBLE), DIAGNOSTIC N/A 9/15/2020    Procedure: BRONCHOSCOPY, WITH BRONCHOALVEOLAR LAVAGE;  Surgeon: Elder Mejia MD;  Location:  GI     BRONCHOSCOPY (RIGID OR FLEXIBLE), DIAGNOSTIC N/A 9/17/2020    Procedure: BRONCHOSCOPY, WITH BRONCHOALVEOLAR LAVAGE;  Surgeon: Bill Lemus MD;  Location: [facility]     BRONCHOSCOPY RIGID OR FLEXIBLE W/TRANSENDOSCOPIC ENDOBRONCHIAL ULTRASOUND GUIDED Right 9/8/2020    Procedure: BRONCHOSCOPY, WITH ENDOBRONCHIAL ULTRASOUND;  Surgeon: Donny Mercedes MD;  Location:  GI     COLONOSCOPY N/A 12/7/2018    Procedure: COLONOSCOPY;  Surgeon: Krish Mercado MD;  Location: [facility] OR     CV HEART BIOPSY N/A 5/24/2020    Procedure: Heart Biopsy;  Surgeon: Kit Bacon MD;  Location:  HEART CARDIAC CATH LAB     CV HEART BIOPSY N/A 6/1/2020    Procedure: CV HEART BIOPSY;  Surgeon: Kit Bacon MD;  Location:  HEART CARDIAC CATH LAB     CV HEART BIOPSY N/A 6/8/2020    Procedure: CV HEART BIOPSY;  Surgeon: Kit Bacon MD;  Location:  HEART CARDIAC CATH LAB     CV HEART BIOPSY N/A 6/15/2020    Procedure: CV HEART BIOPSY;  Surgeon: Kit Bacon MD;  Location:  HEART CARDIAC CATH LAB     CV HEART BIOPSY N/A 6/30/2020    Procedure: CV HEART BIOPSY;  Surgeon: Kit Bacon MD;  Location:  HEART CARDIAC CATH LAB     CV HEART BIOPSY N/A 7/14/2020    Procedure: CV HEART BIOPSY;  Surgeon:  Brian Long MD;  [facility]     CV HEART BIOPSY N/A 7/29/2020    Procedure: CV HEART BIOPSY;  Surgeon: Momo Hunt MD;  [facility] LAB     CV HEART BIOPSY N/A 8/13/2020    Procedure: CV HEART BIOPSY;  Surgeon: Khris Mcclelland MD;  Location: [facility] CARDIAC CATH LAB     CV HEART BIOPSY N/A 8/26/2020    Procedure: CV HEART BIOPSY;  Surgeon: Momo Hunt MD;  Location: [facility] CARDIAC CATH LAB     CV RIGHT HEART CATH N/A 2/19/2019    Procedure: RHC;  Surgeon: Brian Long MD;  Location:  HEART CARDIAC CATH LAB     CV RIGHT HEART CATH N/A 7/5/2019    Procedure: CV RIGHT HEART CATH;  Surgeon: Khris Mcclelland MD;  Location:  HEART CARDIAC CATH LAB     CV RIGHT HEART CATH N/A 5/24/2020    Procedure: Right Heart Cath;  Surgeon: Kit Bacon MD;  [facility]     CV RIGHT HEART CATH N/A 6/1/2020    Procedure: CV RIGHT HEART CATH;  Surgeon: Kit Bacon MD;  Location:  HEART CARDIAC CATH LAB     CV RIGHT HEART CATH N/A 6/8/2020    Procedure: CV RIGHT HEART CATH;  Surgeon: Kit Bacon MD;  Location:  HEART CARDIAC CATH LAB     CV RIGHT HEART CATH N/A 6/15/2020    Procedure: CV RIGHT HEART CATH;  Surgeon: Kit Bacon MD;  Location: [facility]ARD[facility] C[facility] LAB     CV RIGHT HEART CATH N/A 6/30/2020    Procedure: CV RIGHT HEART CATH;  Surgeon: Kit Bacon MD;  Location:  HEART CARDIAC CATH LAB     CV RIGHT HEART CATH N/A 7/14/2020    Procedure: CV RIGHT HEART CATH;  Surgeon: Brian Long MD;  Location:  HEART CARDIAC CATH LAB     CV RIGHT HEART CATH N/A 7/29/2020    Procedure: CV RIGHT HEART CATH;  Surgeon: Momo Hunt MD;  Location:  HEART CARDIAC CATH LAB     CV RIGHT HEART CATH N/A 8/13/2020    Procedure: CV RIGHT HEART CATH;  Surgeon: Khris Mcclelland MD;  Location:  HEART CARDIAC CATH  LAB     CV RIGHT HEART CATH N/A 8/26/2020    Procedure: CV RIGHT HEART CATH;  Surgeon: Momo Hunt MD;  Location:  HEART CARDIAC CATH LAB     ENDOBRONCHIAL ULTRASOUND FLEXIBLE N/A 9/17/2020    Procedure: BRONCHOSCOPY, flexible, endobronchial ultrasound with transbronchial biopsies, endobronchial biopsies;  Surgeon: Bill Lemus MD;  Location: [facility]      PRQ TRLUML CORONARY ANGIOPLASTY ADDL BRANCH      PCI and ALYSSA to ostial LAD on 6/27/18     IMPLANT AUTOMATIC IMPLANTABLE CARDIOVERTER DEFIBRILLATOR       INSERT VENTRICULAR ASSIST DEVICE LEFT (HEARTMATE II) N/A 12/31/2018    Procedure: Median Sternotomy, On Cardiopulmonary Bypass Pump, Insertion of Left Ventricular Assist Device (Heartmate III);  Surgeon: Kamaljit Baker MD;  Location: [facility] OR     PICC DOUBLE LUMEN PLACEMENT Right 05/22/2020    5FR DL PICC inserted at right basilic vein, length 38cm.     TRANSPLANT HEART RECIPIENT AFTER HEART MATE N/A 5/18/2020    Procedure: REDO MEDIAN STERNOTOMY, LYSIS OF ADHESIONS, ON CARDIOPULMONARY BYPASS PUMP, HEART TRANSPLANT RECIPIENT, REMOVAL OF LEFT VENTRICULAR ASSIST DEVICE, REMOVAL OF AUTOMATED IMPLANTABLE CARDIOVERTER DEFIBRILLATOR;  Surgeon: Elder Willis MD;  Location:  OR               Social History:     Social History     Tobacco Use     Smoking status: Never Smoker     Smokeless tobacco: Never Used   Substance Use Topics     Alcohol use: No     Frequency: Never             Family History:   I have reviewed this patient's family history          Immunizations:     Immunization History   Administered Date(s) Administered     HepA-Adult 06/14/2019, 01/03/2020     HepB-Adult 06/07/2019, 07/11/2019, 01/03/2020     Mantoux Tuberculin Skin Test 01/12/2019     Pneumo Conj 13-V (2010&after) 06/07/2019     TDAP Vaccine (Boostrix) 08/28/2007, 06/07/2019             Allergies:   No Known Allergies          Medications:     Current Outpatient Medications   Medication Sig     ACCU-CHEK [place]LOTTE PLUS test  strip Check BG 3 times daily at meals and at bedtime.     acetaminophen (TYLENOL) 325 MG tablet Take 2 tablets (650 mg) by mouth every 4 hours as needed for other (multimodal surgical pain management along with NSAIDS and opioid medication as indicated based on pain control and physical function.)     amLODIPine (NORVASC) 5 MG tablet Take 1 tablet (5 mg) by mouth daily     BD SILVANA U/F 32G X 4 MM insulin pen needle Use 3-4 daily.     calcium carbonate 600 mg-vitamin D 400 units (CALTRATE) 600-400 MG-UNIT per tablet Take 1 tablet by mouth 2 times daily (with meals)     ciprofloxacin (CIPRO) 500 MG tablet Take 1 tablet (500 mg) by mouth every 12 hours Until 10/11 or as directed by transplant infectious disease     clindamycin (CLEOCIN) 150 MG capsule Take 3 capsules (450 mg) by mouth every 8 hours Until 10/11 or as directed by transplant infectious disease     Continuous Blood Gluc  (DEXCOM G6 ) DON 1 each daily     Continuous Blood Gluc Sensor (DEXCOM G6 SENSOR) MISC 1 each every 10 days     Continuous Blood Gluc Transmit (DEXCOM G6 TRANSMITTER) MISC 1 each every 3 months     hydrALAZINE (APRESOLINE) 25 MG tablet Take 4 tablets (100 mg) by mouth 3 times daily     insulin aspart (NOVOLOG PEN) 100 UNIT/ML pen 1 unit / 5  gms CHO with meals,plus correction insulin.     insulin aspart (NOVOLOG PEN) 100 UNIT/ML pen Inject 1-11 Units Subcutaneous At Bedtime Inject 1-11 units at bedtime per custom scale   For Bedtime BG less than 200, no additional insulin needed   to 219 give 1 units.    to 239 give 2 units.    to 259 give 3 units.    to 279 give 4 units.    to 299 give 5 units.    to 319 give 6 units.    to 339 give 7 units.    to 359 give 8 units    to 379 give 9 units.   to 399 give 10 units.  BG >/=400 give 11 units.     insulin aspart (NOVOLOG PEN) 100 UNIT/ML pen Inject 1-14 units per custom scale  For Pre-Meal BG less than 140, no additional  insulin needed   to 159 give 1 units.    to 179 give 2 units.    to 199 give 3 units.    to 219 give 4 units.    to 239 give 5 units.    to 259 give 6 units.    to 279 give 7 units.    to 299 give 8 units.    to 319 give 9 units.    to 339 give 10 units.    to 359 give 11 units.    to 379 give 12 units.   to 399 give 13 units.  BG >/=400 give 14 units.     insulin glargine (LANTUS PEN) 100 UNIT/ML pen Inject 24 Units Subcutaneous At Bedtime     loperamide (IMODIUM) 2 MG capsule Take 1 capsule (2 mg) by mouth 4 times daily as needed for diarrhea     magnesium oxide (MAG-OX) 400 (241.3 Mg) MG tablet Take 1 tablet (400 mg) by mouth 2 times daily     multivitamin w/minerals (THERA-VIT-M) tablet Take 1 tablet by mouth daily     mycophenolate (GENERIC EQUIVALENT) 500 MG tablet Take 1 tablet (500 mg) by mouth 2 times daily     pantoprazole (PROTONIX) 40 MG EC tablet Take 1 tablet (40 mg) by mouth every morning (before breakfast)     rosuvastatin (CRESTOR) 10 MG tablet TAKE ONE TABLET BY MOUTH ONCE DAILY     tacrolimus (GENERIC EQUIVALENT) 1 MG capsule Take 4mg (4 caps) in the AM and 3mg (3 caps) in the PM.     valGANciclovir (VALCYTE) 450 MG tablet TAKE ONE TABLET (450MG) BY MOUTH DAILY     No current facility-administered medications for this visit.             Review of Systems:   As mentioned in the interim history otherwise negative by reviewing constitutional symptoms, central and peripheral neurological systems, respiratory system, cardiac system, GI system,  system, musculoskeletal, skin, allergy, and lymphatics.                  Physical Exam:   There were no vitals taken for this visit. it was a virtual visit.   He was coughing during the virtual visit, but also was walking around the house without difficulties, he did not appear short of breath.   Constitutional: awake, alert, cooperative, no apparent distress and appears at stated  age, well nourished.            Laboratory Data:     Absolute CD4   Date Value Ref Range Status   09/12/2020 359 (L) 441 - 2,156 cells/uL Final       Inflammatory Markers    Recent Labs   Lab Test 07/23/18  0645   CRP 11.0*       Immune Globulin Studies      Recent Labs   Lab Test 09/13/20  0612   *   IGM 29*   *          Metabolic Studies    Recent Labs   Lab Test 09/30/20  0835 09/24/20  0451 09/23/20  0531 09/22/20  0530 09/21/20  0527 09/20/20  0605 09/13/20  0804 09/13/20  0804 09/01/20  0658 09/01/20  0658 08/19/20  0558 08/19/20  0558 06/15/20  0826 06/15/20  0826    136 135 135 137 135   < >  --    < > 134   < > 139   < > 139   POTASSIUM 4.6 5.0 5.0 4.9 5.1 5.1   < >  --    < > 3.8   < > 3.6   < > 4.8   CHLORIDE 108 109 107 108 110* 108   < >  --    < > 104   < > 107   < > 110*   CO2 20 19* 20 20 20 20   < >  --    < > 22   < > 25   < > 21   ANIONGAP 8 8 8 8 7 7   < >  --    < > 9   < > 6   < > 8   BUN 40* 35* 35* 33* 37* 46*   < >  --    < > 21   < > 27   < > 98*   CR 1.92* 1.70* 1.72* 1.76* 1.86* 2.29*   < >  --    < > 1.38*   < > 1.31*   < > 1.89*   GFRESTIMATED 38* 44* 43* 42* 39* 30*   < >  --    < > 56*   < > 60*   < > 38*   * 107* 180* 160* 160* 336*   < >  --    < > 118*   < > 69*   < > 120*   A1C  --   --   --   --   --   --   --   --   --   --   --  7.4*   < > 7.8*   ARLENE 9.2 9.0 9.2 8.5 8.5 8.1*   < >  --    < > 8.5   < > 8.4*   < > 9.0   PHOS 4.3 4.1 3.3 3.3 3.6 2.2*   < >  --   --   --    < >  --    < > 4.4   MAG 1.5* 2.0 2.0 1.8 1.6 1.6   < >  --    < > 1.4*   < >  --    < > 1.7   LACT  --   --   --   --   --   --   --  0.7  --  0.9   < >  --    < >  --    CKT  --   --   --   --   --   --   --   --   --   --   --   --   --  132    < > = values in this interval not displayed.       Hepatic Studies    Recent Labs   Lab Test 09/30/20  0835 09/24/20  0451 09/22/20  0530 09/21/20  0527 09/20/20  0605 09/19/20  0513 09/12/20  1204 09/12/20  1204 09/01/20  0658  09/01/20  0658   BILITOTAL 0.5 0.3 0.5 0.4 0.3 0.3   < >  --    < > 0.9   ALKPHOS 69 75 84 84 100 95   < >  --    < > 62   PROTTOTAL 7.0 6.0* 6.1* 6.0* 5.9* 5.6*   < >  --    < > 6.1*   ALBUMIN 3.1* 2.3* 2.3* 2.1* 2.0* 1.9*   < >  --    < > 2.4*   AST 19 30 38 52* 105* 87*   < >  --    < > 16   ALT 23 46 69 82* 109* 74*   < >  --    < > 11   LDH  --   --   --   --   --   --   --  380*  --  228*    < > = values in this interval not displayed.       Hematology Studies     Recent Labs   Lab Test 09/30/20  0835 09/24/20  0451 09/23/20  0531 09/21/20  0527 09/20/20  0605 09/19/20  0513 09/05/20  0549 09/05/20  0549 09/04/20  0543 09/03/20  0526 09/02/20  0551 09/01/20  0658   WBC 4.4 3.0* 3.7* 3.3* 3.8* 5.1   < > 5.3 3.8* 3.1* 2.8* 3.4*   ANEU  --   --   --  2.2  --   --   --  4.1 3.1 2.4 1.8 2.6   ALYM  --   --   --  0.7*  --   --   --  0.8 0.3* 0.3* 0.7* 0.5*   MARTHA  --   --   --  0.2  --   --   --  0.3 0.2 0.2 0.2 0.3   AEOS  --   --   --  0.0  --   --   --  0.0 0.2 0.1 0.1 0.0   HGB 8.9* 8.2* 9.1* 8.1* 8.2* 7.8*   < > 8.3* 8.7* 8.3* 8.1* 7.9*   HCT 29.1* 26.8* 30.1* 26.7* 27.7* 26.5*   < > 26.5* 27.5* 27.0* 26.7* 26.0*    307 345 348 380 411   < > 353 376 333 354 336    < > = values in this interval not displayed.       Clotting Studies    Recent Labs   Lab Test 08/26/20  0939 06/04/20  0900 06/01/20  0856 05/18/20  1835 05/18/20  1630 05/18/20  1248 05/18/20  0010   INR 1.06 1.16* 1.16* 1.54* 2.21* 1.42* 2.19*   PTT  --   --   --  31 31 34 38*       Urine Studies    Recent Labs   Lab Test 09/16/20  1620 09/14/20  2020 08/31/20  1615 08/20/20  1630 08/18/20  1404   URINEPH 5.5 5.5 5.5 5.5 5.5   NITRITE Negative Negative Negative Negative Negative   LEUKEST Trace* Negative Negative Small* Moderate*   WBCU 5 4 1 12* 28*         Microbiology:  Last 6 Culture results with specimen source  Culture Micro   Date Value Ref Range Status   09/17/2020 No Actinomyces species isolated  Final   09/17/2020   Preliminary     Culture received and in progress.  Positive AFB results are called as soon as detected.    Final report to follow in 7 to 8 weeks.     09/17/2020   Preliminary    Assayed at [facility] [address] [place] [address] [phone]   09/17/2020 Culture negative after 2 weeks  Preliminary   09/17/2020 No Legionella species isolated  Final   09/17/2020 No growth after 15 days  Preliminary   09/17/2020 (A)  Final    On day 3, isolated in broth only:  Abiotrophia defectiva  Susceptibility testing not routinely done      Specimen Description   Date Value Ref Range Status   09/17/2020 Lung Right upper lobe Lesion  Final   09/17/2020 Lung Right upper lobe Lesion  Final   09/17/2020 Lung Right upper lobe Lesion  Final   09/17/2020 Lung Right upper lobe Lesion  Final   09/17/2020 Lung Right upper lobe Lesion  Final   09/17/2020 Lung Right upper lobe Lesion  Final   09/17/2020 Lung Right upper lobe Lesion  Final   09/17/2020 Lung Right upper lobe Lesion  Final   09/17/2020 Lung Right upper lobe Lesion  Final        Last check of C difficile  C Diff Toxin B PCR   Date Value Ref Range Status   09/17/2020 Negative NEG^Negative Final     Comment:     Negative: C. difficile target DNA sequences NOT detected, presumed negative   for C.difficile toxin B or the number of bacteria present may be below the   limit of detection for the test.  FDA approved assay performed using [employer] GeneXpert real-time PCR.  A negative result does not exclude actual disease due to C. difficile and may   be due to improper collection, handling and storage of the specimen or the   number of organisms in the specimen is below the detection limit of the assay.           Virology:  CMV viral loads    CMV viral loads  No results found for: 92927, 12830, 80538, 05810, CMVQAL  CMV viral loads    Recent Labs   Lab Test 09/13/20  0612 09/01/20  0658   CSPEC Plasma Plasma   CMVLOG Not Calculated Not Calculated       CMV viral loads    Log IU/mL of  CMVQNT   Date Value Ref Range Status   09/13/2020 Not Calculated <2.1 [Log_IU]/mL Final   09/01/2020 Not Calculated <2.1 [Log_IU]/mL Final   08/19/2020 Not Calculated <2.1 [Log_IU]/mL Final   08/13/2020 Not Calculated <2.1 [Log_IU]/mL Final   06/15/2020 Not Calculated <2.1 [Log_IU]/mL Final       CMV resistance testing  No lab results found.  No results found for: CMVCID, CMVFOS, CMVGAN     EBV viral loads     Recent Labs   Lab Test 09/01/20  0658 08/13/20  0840 06/15/20  0826   EBRES EBV DNA Not Detected EBV DNA Not Detected EBV DNA Not Detected     EBV DNA Copies/mL   Date Value Ref Range Status   09/01/2020 EBV DNA Not Detected EBVNEG^EBV DNA Not Detected [Copies]/mL Final   08/13/2020 EBV DNA Not Detected EBVNEG^EBV DNA Not Detected [Copies]/mL Final   06/15/2020 EBV DNA Not Detected EBVNEG^EBV DNA Not Detected [Copies]/mL Final       Human Herpes Virus 6 viral loads    No results found for: H6RES No results found for: H6SPEC    CMV Antibody IgG   Date Value Ref Range Status   05/18/2020 0.3 0.0 - 0.8 AI Final     Comment:     Negative  Antibody index (AI) values reflect qualitative changes in antibody   concentration that cannot be directly associated with clinical condition or   disease state.       Toxoplasma Antibody IgG   Date Value Ref Range Status   05/18/2020 <3.0 0.0 - 7.1 IU/mL Final     Comment:     Negative- Absence of detectable Toxoplasma gondii IgG antibodies. A negative   result does not rule out acute infection.  The magnitude of the measured result is not indicative of the amount of   antibody present. The concentrations of anti-Toxoplasma gondii IgG in a given   specimen determined with assays from different manufacturers can vary due to   differences in assay methods and reagent specificity.         Pathology:  RUL FNA 9/17/2020  Lung, right upper lobe lesion, endobronchial ultrasound guided fine needle    aspiration:   - Negative for malignancy   TBBx 9/8/2020  FINAL DIAGNOSIS:   A. No  specimen received.   B. LUNG, RIGHT UPPER LOBE, ENDOBRONCHIAL BIOPSIES:   - Two small fragments of respiratory mucosa with chronic inflammation,   frequent eosinophils, submucosal edema   and focal squamous metaplasia.   - Negative for granulomatous inflammation or malignancy in this biopsy,     Imaging:  CT chest 10/2/2020  IMPRESSION:  Abnormalities are stable to slightly improved from  9/21/2020.  CT 9/21/2020 reviewed with the radiologist  Impression: Positive treatment response as evidenced by:  1. Moderate decrease in size of right suprahilar mass. This mass has  aggressive features including mediastinal and bronchial invasion as  well as mediastinal lymphadenopathy. These invasive features have  improved.  2. Previously occluded right upper lobe bronchi are now patent,  although remains mildly narrowed.  3. Improvement and bronchocentric groundglass and nodular opacities in  the right lower lobe favored to be infectious.  4. Near complete resolution of right-sided pleural effusion with  resolution of intralobular septal thickening in the right lower lobe.  CT chest 9/13/2020  IMPRESSION:  1. Increased size of the right hilar mass/consolidation with  associated narrowing of the subsegmental right upper lobe bronchioles  and multiple scattered pulmonary nodules as above. Differential  continues to include infection versus malignancy with postobstructive  pneumonia.  2. Increased bibasilar groundglass and right lung tree-in-bud nodular  opacities suggestive of infection, possibly secondary to aspiration.  Small right pleural effusion.  3. Surgical changes of cardiac transplantation.  4. Cholelithiasis without cholecystitis.  CT chest 9/2/2020   IMPRESSION:   1. New mass vs consolidation in the right upper lobe and right hilum  since 7/29/2020 with narrowing of multiple right upper lobe bronchi.   1a. Differential pneumonia with reactive right hilar lymphadenopathy  vs. malignancy(ex. Lymphoma/PTLD) with post  obstructive pneumonia.  2. Stable additional pulmonary lesions as above.  3. Stable postsurgical changes of heart transplant.  4. Cholelithiasis without evidence of cholecystitis.

## 2020-10-06 NOTE — LETTER
"10/6/2020       RE: Mariusz Sharp  [address] [facility] [address]     Dear Colleague,    Thank you for referring your patient, Mariusz Sharp, to the [facility] [place] at [facility]. Please see a copy of my visit note below.    Mariusz Sharp is a 59 year old male who is being evaluated via a billable video visit.      The patient has been notified of following:     \"This video visit will be conducted via a call between you and your physician/provider. We have found that certain health care needs can be provided without the need for an in-person physical exam.  This service lets us provide the care you need with a video conversation.  If a prescription is necessary we can send it directly to your pharmacy.  If lab work is needed we can place an order for that and you can then stop by our lab to have the test done at a later time.    Video visits are billed at different rates depending on your insurance coverage.  Please reach out to your insurance provider with any questions.    If during the course of the call the physician/provider feels a video visit is not appropriate, you will not be charged for this service.\"    Patient has given verbal consent for Video visit? Yes  How would you like to obtain your AVS? MyChart  If you are dropped from the video visit, the video invite should be resent to: Text to cell phone: [phone]   Will anyone else be joining your video visit? No        Video-Visit Details    Type of service:  Video Visit    Video Start Time: 4:30 PM  Video End Time: 4:54 PM    Originating Location (pt. Location): Home    Distant Location (provider location):  [facility] [place]     Platform used for Video Visit: Lindsey Rodriguez MD          [facility]    Transplant Infectious Diseases Outpatient Progress note      Patient:  Mariusz Sharp, Date " of birth 1960, Medical record number [id number]  Date of Visit:  10/06/2020         Recommendations:   1. Discontinue cipro and clindamycin as planned on 10/11/2020.   2. CT chest next time you're in town 10/29/2020.   - further recommendations depends on symptoms and CT findings.     RTC: 1-2 months, can be virtual.         Summary of Presentation:   Transplants:  5/18/2020 (Heart), Postoperative day:  141     This patient is a 59 year old male with ICMP s/p OHT basiliximab for induction, TAC/MMF for maintenance IS.   Recently discharged after hospitalization for cough, fever, shortness of breath and post tussive LOC. Treated for RUL pneumonia.         Active Problems and Infectious Diseases Issues:   1. Likely aspiration pneumonia vs postobstructive pneumonia  The patient responded to IV zosyn while he was in the hospital with resolution of fever and improvement of chest CT. His clinical and radiological findings did not improve with posaconazole.   The patient is finishing a total of 4 weeks of ABx. Still with cough but no fever or chills. CT is slowly improving but still with right hilar infiltration.      The BALs on 9/8/2020, 9/15/2020 and 9/17/2020 were all unremarkable except for the growth of Abiotrophia defectiva (oral simona).   The TBBx were all non-diagnostic/unremarkable.     Will continue current ABx to treat for possible aspiration pneumonia for total of 4 weeks, last day 10/11/2020. Will repeat CT next time he's in town for the right heart cath.      2. The patient was being treated for chronic prostatitis with cefdinir.   While on cipro, there would be no need resume cefdinir.   He would have finished the planned 6 week therapy on 10/1/2020, however, he remains on cipro till 10/6/2020 for the aspiration pneumonia, so overall he received 6-8 weeks of therapy.          Old Problems and Infectious Diseases Issues:   1. 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   2. C acnes in the extracted VAD; treated with vancomycin then doxycycline.     Other Infectious Disease issues include:  - QTc 475 as of 9/16/2020.   - PCP prophylaxis: bactrim caused hyperkalemia, then pentamidine. Now off PJP ppx.   - Serostatus: CMV D+/R-, EBV D-/R+, HSV1?/2?, VZV +, Toxo D-/R-     On VGCV ppx per protocol.   - Gamma globulin status: 510 as of 9/13/2020       Attestation:  I interviewed the patient and obtained history from the patient, and by reviewing the patient's chart including outside records, microbiological data, and radiological data. All data are summarized in this notes.  Tavares Rodriguez MD   Pager: [phone]  10/06/2020         Interim History:   No more fever or LOC with cough, but the cough has not really improved. It's still constant during the day. The cough does not awaken the patient. The cough is sometimes productive.   No N/V. The weight is stable.         HPI:       Transplants:  5/18/2020 (Heart); Postoperative day:  141  In summary:  Presented to the hospital with 24 hr of dyspnea, fever, and cough with post tussive LOC.   He was found to have RUL consolidation vs obstructive LN vs mass.   Non-invasive workup with urine and blood Histo and Blasto Ag were negative, A galactomannan negative, BD glucan low positive at 86 and 90.  He was treated with posaconazole empirically from 9/4/2020 till 9/13/202 when he spiked fever and CT showed worsening of the mass/consolidation. Zosyn was initiated on 9/13/2020 for possible post obstructive pneumonia with resolution of fever and posaconazole was discontinued due to lack of effectiveness.   Bronchoscopy with TBBx on 9/8/2020 and 9/15/2020 were both not diagnostic. FNA on 9/17/202 and BAL the same day were also not significant.   The biopsy from 9/8/2020 was also sent for NGS by PCR of 28S DNA and was negative for fungal infection.   EBV was not detected.     Given the significant improvement with zosyn, it was  believed that the RUL infiltrate was due to aspiration or postobstructive pneumonia. He was discharged on cipro and clindamycin PO till 10/11/2020 for total of 4 weeks.            Past Medical History:     Past Medical History:   Diagnosis Date     Acute kidney injury (H)      CKD (chronic kidney disease)      Coronary artery disease      Diabetes mellitus (H)      HTN (hypertension)      Hyperlipidemia      ICD (implantable cardioverter-defibrillator), single chamber- NOT dependent 7/17/2018     Ischemic cardiomyopathy      LV (left ventricular) mural thrombus      Paroxysmal atrial flutter (H)      RVF (right ventricular failure) (H)      Systolic heart failure (H)      Ventricular tachycardia (H)              Past Surgical History:     Past Surgical History:   Procedure Laterality Date     BRONCHOSCOPY (RIGID OR FLEXIBLE), DIAGNOSTIC N/A 9/15/2020    Procedure: BRONCHOSCOPY, WITH BRONCHOALVEOLAR LAVAGE;  Surgeon: Elder Mejia MD;  Location:  GI     BRONCHOSCOPY (RIGID OR FLEXIBLE), DIAGNOSTIC N/A 9/17/2020    Procedure: BRONCHOSCOPY, WITH BRONCHOALVEOLAR LAVAGE;  Surgeon: Bill Lemus MD;  Location:  OR     BRONCHOSCOPY RIGID OR FLEXIBLE W/TRANSENDOSCOPIC ENDOBRONCHIAL ULTRASOUND GUIDED Right 9/8/2020    Procedure: BRONCHOSCOPY, WITH ENDOBRONCHIAL ULTRASOUND;  Surgeon: Donny Mercedes MD;  Location:  GI     COLONOSCOPY N/A 12/7/2018    Procedure: COLONOSCOPY;  Surgeon: Krish Mercado MD;  Location: [facility] OR     CV HEART BIOPSY N/A 5/24/2020    Procedure: Heart Biopsy;  Surgeon: Kit Bacon MD;  Location:  HEART CARDIAC CATH LAB     CV HEART BIOPSY N/A 6/1/2020    Procedure: CV HEART BIOPSY;  Surgeon: Kit Bacon MD;  Location:  HEART CARDIAC CATH LAB     CV HEART BIOPSY N/A 6/8/2020    Procedure: CV HEART BIOPSY;  Surgeon: Kit Bacon MD;  Location:  HEART CARDIAC CATH LAB     CV HEART BIOPSY N/A 6/15/2020    Procedure: CV HEART  BIOPSY;  Surgeon: Kit Bacon MD;  [facility]ARD[facility]AC [facility]     CV HEART BIOPSY N/A 6/30/2020    Procedure: CV HEART BIOPSY;  Surgeon: Kit Bacon MD;  [facility]     CV HEART BIOPSY N/A 7/14/2020    Procedure: CV HEART BIOPSY;  Surgeon: Brian Long MD;  Location: [facility] HEART CARDIAC CATH LAB     CV HEART BIOPSY N/A 7/29/2020    Procedure: CV HEART BIOPSY;  Surgeon: Momo Hunt MD;  [facility]: [facility]ARD[facility]AC [facility]     CV HEART BIOPSY N/A 8/13/2020    Procedure: CV HEART BIOPSY;  Surgeon: Khris Mcclelland MD;  Location: [facility] HEART CARDIAC CATH LAB     CV HEART BIOPSY N/A 8/26/2020    Procedure: CV HEART BIOPSY;  Surgeon: Momo Hunt MD;  Location: [facility] HEART CARDIAC CATH LAB     CV RIGHT HEART CATH N/A 2/19/2019    Procedure: RHC;  Surgeon: Brian Long MD;  Location: [facility] HEART CARDIAC [facility]ATH LAB     CV RIGHT HEART CATH N/A 7/5/2019    Procedure: CV RIGHT HEART CATH;  Surgeon: Khris Mcclelland MD;  Location: [facility] HEART CARDIAC CATH LAB     CV RIGHT HEART CATH N/A 5/24/2020    Procedure: Right Heart Cath;  Surgeon: Kit Bacon MD;  Location: [facility] HEART CARDIAC CATH LAB     CV RIGHT HEART CATH N/A 6/1/2020    Procedure: CV RIGHT HEART CATH;  Surgeon: Kit Bacon MD;  Location: [facility] HEART CARD[facility]AC [facility]ATH LAB     CV RIGHT HEART CATH N/A 6/8/2020    Procedure: CV RIGHT HEART CATH;  Surgeon: Kit Bacon MD;  Location: [facility] HEART CARDIAC CATH LAB     CV RIGHT HEART CATH N/A 6/15/2020    Procedure: CV RIGHT HEART CATH;  Surgeon: Kit Bacon MD;  Location: [facility] HEART CARDIAC CATH LAB     CV RIGHT HEART CATH N/A 6/30/2020    Procedure: CV RIGHT HEART CATH;  Surgeon: Kit Bacon, MD;  Location:  HEART CARDIAC CATH LAB     CV RIGHT HEART CATH N/A 7/14/2020    Procedure: CV RIGHT HEART CATH;  Surgeon: Brian Long MD;  Location:   HEART CARDIAC CATH LAB     CV RIGHT HEART CATH N/A 7/29/2020    Procedure: CV RIGHT HEART CATH;  Surgeon: Momo Hunt MD;  Location:  HEART CARDIAC CATH LAB     CV RIGHT HEART CATH N/A 8/13/2020    Procedure: CV RIGHT HEART CATH;  Surgeon: Khris Mcclelland MD;  Location:  HEART CARDIAC CATH LAB     CV RIGHT HEART CATH N/A 8/26/2020    Procedure: CV RIGHT HEART CATH;  Surgeon: Momo Hunt MD;  Location:  HEART CARDIAC CATH LAB     ENDOBRONCHIAL ULTRASOUND FLEXIBLE N/A 9/17/2020    Procedure: BRONCHOSCOPY, flexible, endobronchial ultrasound with transbronchial biopsies, endobronchial biopsies;  Surgeon: Bill Lemus MD;  Location:  OR      PRQ TRLUML CORONARY ANGIOPLASTY ADDL BRANCH      PCI and ALYSSA to ostial LAD on 6/27/18     IMPLANT AUTOMATIC IMPLANTABLE CARDIOVERTER DEFIBRILLATOR       INSERT VENTRICULAR ASSIST DEVICE LEFT (HEARTMATE II) N/A 12/31/2018    Procedure: Median Sternotomy, On Cardiopulmonary Bypass Pump, Insertion of Left Ventricular Assist Device (Heartmate III);  Surgeon: Kamaljit Baker MD;  Location: [facility] OR     PICC DOUBLE LUMEN PLACEMENT Right 05/22/2020    5FR DL PICC inserted at right basilic vein, length 38cm.     TRANSPLANT HEART RECIPIENT AFTER HEART MATE N/A 5/18/2020    Procedure: REDO MEDIAN STERNOTOMY, LYSIS OF ADHESIONS, ON CARDIOPULMONARY BYPASS PUMP, HEART TRANSPLANT RECIPIENT, REMOVAL OF LEFT VENTRICULAR ASSIST DEVICE, REMOVAL OF AUTOMATED IMPLANTABLE CARDIOVERTER DEFIBRILLATOR;  Surgeon: Elder Willis MD;  Location:  OR               Social History:     Social History     Tobacco Use     Smoking status: Never Smoker     Smokeless tobacco: Never Used   Substance Use Topics     Alcohol use: No     Frequency: Never             Family History:   I have reviewed this patient's family history          Immunizations:     Immunization History   Administered Date(s) Administered     HepA-Adult 06/14/2019, 01/03/2020     HepB-Adult 06/07/2019,  07/11/2019, 01/03/2020     Mantoux Tuberculin Skin Test 01/12/2019     Pneumo Conj 13-V (2010&after) 06/07/2019     TDAP Vaccine (Boostrix) 08/28/2007, 06/07/2019             Allergies:   No Known Allergies          Medications:     Current Outpatient Medications   Medication Sig     ACCU-CHEK CHARLOTTE PLUS test strip Check BG 3 times daily at meals and at bedtime.     acetaminophen (TYLENOL) 325 MG tablet Take 2 tablets (650 mg) by mouth every 4 hours as needed for other (multimodal surgical pain management along with NSAIDS and opioid medication as indicated based on pain control and physical function.)     amLODIPine (NORVASC) 5 MG tablet Take 1 tablet (5 mg) by mouth daily     BD SILVANA U/F 32G X 4 MM insulin pen needle Use 3-4 daily.     calcium carbonate 600 mg-vitamin D 400 units (CALTRATE) 600-400 MG-UNIT per tablet Take 1 tablet by mouth 2 times daily (with meals)     ciprofloxacin (CIPRO) 500 MG tablet Take 1 tablet (500 mg) by mouth every 12 hours Until 10/11 or as directed by transplant infectious disease     clindamycin (CLEOCIN) 150 MG capsule Take 3 capsules (450 mg) by mouth every 8 hours Until 10/11 or as directed by transplant infectious disease     Continuous Blood Gluc  (DEXCOM G6 ) DON 1 each daily     Continuous Blood Gluc Sensor (DEXCOM G6 SENSOR) MISC 1 each every 10 days     Continuous Blood Gluc Transmit (DEXCOM G6 TRANSMITTER) MISC 1 each every 3 months     hydrALAZINE (APRESOLINE) 25 MG tablet Take 4 tablets (100 mg) by mouth 3 times daily     insulin aspart (NOVOLOG PEN) 100 UNIT/ML pen 1 unit / 5  gms CHO with meals,plus correction insulin.     insulin aspart (NOVOLOG PEN) 100 UNIT/ML pen Inject 1-11 Units Subcutaneous At Bedtime Inject 1-11 units at bedtime per custom scale   For Bedtime BG less than 200, no additional insulin needed   to 219 give 1 units.    to 239 give 2 units.    to 259 give 3 units.    to 279 give 4 units.    to 299 give 5  units.    to 319 give 6 units.    to 339 give 7 units.    to 359 give 8 units    to 379 give 9 units.   to 399 give 10 units.  BG >/=400 give 11 units.     insulin aspart (NOVOLOG PEN) 100 UNIT/ML pen Inject 1-14 units per custom scale  For Pre-Meal BG less than 140, no additional insulin needed   to 159 give 1 units.    to 179 give 2 units.    to 199 give 3 units.    to 219 give 4 units.    to 239 give 5 units.    to 259 give 6 units.    to 279 give 7 units.    to 299 give 8 units.    to 319 give 9 units.    to 339 give 10 units.    to 359 give 11 units.    to 379 give 12 units.   to 399 give 13 units.  BG >/=400 give 14 units.     insulin glargine (LANTUS PEN) 100 UNIT/ML pen Inject 24 Units Subcutaneous At Bedtime     loperamide (IMODIUM) 2 MG capsule Take 1 capsule (2 mg) by mouth 4 times daily as needed for diarrhea     magnesium oxide (MAG-OX) 400 (241.3 Mg) MG tablet Take 1 tablet (400 mg) by mouth 2 times daily     multivitamin w/minerals (THERA-VIT-M) tablet Take 1 tablet by mouth daily     mycophenolate (GENERIC EQUIVALENT) 500 MG tablet Take 1 tablet (500 mg) by mouth 2 times daily     pantoprazole (PROTONIX) 40 MG EC tablet Take 1 tablet (40 mg) by mouth every morning (before breakfast)     rosuvastatin (CRESTOR) 10 MG tablet TAKE ONE TABLET BY MOUTH ONCE DAILY     tacrolimus (GENERIC EQUIVALENT) 1 MG capsule Take 4mg (4 caps) in the AM and 3mg (3 caps) in the PM.     valGANciclovir (VALCYTE) 450 MG tablet TAKE ONE TABLET (450MG) BY MOUTH DAILY     No current facility-administered medications for this visit.             Review of Systems:   As mentioned in the interim history otherwise negative by reviewing constitutional symptoms, central and peripheral neurological systems, respiratory system, cardiac system, GI system,  system, musculoskeletal, skin, allergy, and lymphatics.                   Physical Exam:   There were no vitals taken for this visit. it was a virtual visit.   He was coughing during the virtual visit, but also was walking around the house without difficulties, he did not appear short of breath.   Constitutional: awake, alert, cooperative, no apparent distress and appears at stated age, well nourished.            Laboratory Data:     Absolute CD4   Date Value Ref Range Status   09/12/2020 359 (L) 441 - 2,156 cells/uL Final       Inflammatory Markers    Recent Labs   Lab Test 07/23/18  0645   CRP 11.0*       Immune Globulin Studies      Recent Labs   Lab Test 09/13/20  0612   *   IGM 29*   *          Metabolic Studies    Recent Labs   Lab Test 09/30/20  0835 09/24/20  0451 09/23/20  0531 09/22/20  0530 09/21/20  0527 09/20/20  0605 09/13/20  0804 09/13/20  0804 09/01/20  0658 09/01/20  0658 08/19/20  0558 08/19/20  0558 06/15/20  0826 06/15/20  0826    136 135 135 137 135   < >  --    < > 134   < > 139   < > 139   POTASSIUM 4.6 5.0 5.0 4.9 5.1 5.1   < >  --    < > 3.8   < > 3.6   < > 4.8   CHLORIDE 108 109 107 108 110* 108   < >  --    < > 104   < > 107   < > 110*   CO2 20 19* 20 20 20 20   < >  --    < > 22   < > 25   < > 21   ANIONGAP 8 8 8 8 7 7   < >  --    < > 9   < > 6   < > 8   BUN 40* 35* 35* 33* 37* 46*   < >  --    < > 21   < > 27   < > 98*   CR 1.92* 1.70* 1.72* 1.76* 1.86* 2.29*   < >  --    < > 1.38*   < > 1.31*   < > 1.89*   GFRESTIMATED 38* 44* 43* 42* 39* 30*   < >  --    < > 56*   < > 60*   < > 38*   * 107* 180* 160* 160* 336*   < >  --    < > 118*   < > 69*   < > 120*   A1C  --   --   --   --   --   --   --   --   --   --   --  7.4*   < > 7.8*   ARLENE 9.2 9.0 9.2 8.5 8.5 8.1*   < >  --    < > 8.5   < > 8.4*   < > 9.0   PHOS 4.3 4.1 3.3 3.3 3.6 2.2*   < >  --   --   --    < >  --    < > 4.4   MAG 1.5* 2.0 2.0 1.8 1.6 1.6   < >  --    < > 1.4*   < >  --    < > 1.7   LACT  --   --   --   --   --   --   --  0.7  --  0.9   < >  --    < >  --     CKT  --   --   --   --   --   --   --   --   --   --   --   --   --  132    < > = values in this interval not displayed.       Hepatic Studies    Recent Labs   Lab Test 09/30/20  0835 09/24/20  0451 09/22/20  0530 09/21/20  0527 09/20/20  0605 09/19/20  0513 09/12/20  1204 09/12/20  1204 09/01/20  0658 09/01/20  0658   BILITOTAL 0.5 0.3 0.5 0.4 0.3 0.3   < >  --    < > 0.9   ALKPHOS 69 75 84 84 100 95   < >  --    < > 62   PROTTOTAL 7.0 6.0* 6.1* 6.0* 5.9* 5.6*   < >  --    < > 6.1*   ALBUMIN 3.1* 2.3* 2.3* 2.1* 2.0* 1.9*   < >  --    < > 2.4*   AST 19 30 38 52* 105* 87*   < >  --    < > 16   ALT 23 46 69 82* 109* 74*   < >  --    < > 11   LDH  --   --   --   --   --   --   --  380*  --  228*    < > = values in this interval not displayed.       Hematology Studies     Recent Labs   Lab Test 09/30/20  0835 09/24/20 0451 09/23/20  0531 09/21/20  0527 09/20/20  0605 09/19/20  0513 09/05/20  0549 09/05/20  0549 09/04/20  0543 09/03/20  0526 09/02/20  0551 09/01/20  0658   WBC 4.4 3.0* 3.7* 3.3* 3.8* 5.1   < > 5.3 3.8* 3.1* 2.8* 3.4*   ANEU  --   --   --  2.2  --   --   --  4.1 3.1 2.4 1.8 2.6   ALYM  --   --   --  0.7*  --   --   --  0.8 0.3* 0.3* 0.7* 0.5*   MARTHA  --   --   --  0.2  --   --   --  0.3 0.2 0.2 0.2 0.3   AEOS  --   --   --  0.0  --   --   --  0.0 0.2 0.1 0.1 0.0   HGB 8.9* 8.2* 9.1* 8.1* 8.2* 7.8*   < > 8.3* 8.7* 8.3* 8.1* 7.9*   HCT 29.1* 26.8* 30.1* 26.7* 27.7* 26.5*   < > 26.5* 27.5* 27.0* 26.7* 26.0*    307 345 348 380 411   < > 353 376 333 354 336    < > = values in this interval not displayed.       Clotting Studies    Recent Labs   Lab Test 08/26/20  0939 06/04/20  0900 06/01/20  0856 05/18/20  1835 05/18/20  1630 05/18/20  1248 05/18/20  0010   INR 1.06 1.16* 1.16* 1.54* 2.21* 1.42* 2.19*   PTT  --   --   --  31 31 34 38*       Urine Studies    Recent Labs   Lab Test 09/16/20  1620 09/14/20  2020 08/31/20  1615 08/20/20  1630 08/18/20  1404   URINEPH 5.5 5.5 5.5 5.5 5.5   NITRITE  Negative Negative Negative Negative Negative   LEUKEST Trace* Negative Negative Small* Moderate*   WBCU 5 4 1 12* 28*         Microbiology:  Last 6 Culture results with specimen source  Culture Micro   Date Value Ref Range Status   09/17/2020 No Actinomyces species isolated  Final   09/17/2020   Preliminary    Culture received and in progress.  Positive AFB results are called as soon as detected.    Final report to follow in 7 to 8 weeks.     09/17/2020   Preliminary    Assayed at [address] [place] [address] [phone]   09/17/2020 Culture negative after 2 weeks  Preliminary   09/17/2020 No Legionella species isolated  Final   09/17/2020 No growth after 15 days  Preliminary   09/17/2020 (A)  Final    On day 3, isolated in broth only:  Abiotrophia defectiva  Susceptibility testing not routinely done      Specimen Description   Date Value Ref Range Status   09/17/2020 Lung Right upper lobe Lesion  Final   09/17/2020 Lung Right upper lobe Lesion  Final   09/17/2020 Lung Right upper lobe Lesion  Final   09/17/2020 Lung Right upper lobe Lesion  Final   09/17/2020 Lung Right upper lobe Lesion  Final   09/17/2020 Lung Right upper lobe Lesion  Final   09/17/2020 Lung Right upper lobe Lesion  Final   09/17/2020 Lung Right upper lobe Lesion  Final   09/17/2020 Lung Right upper lobe Lesion  Final        Last check of C difficile  C Diff Toxin B PCR   Date Value Ref Range Status   09/17/2020 Negative NEG^Negative Final     Comment:     Negative: C. difficile target DNA sequences NOT detected, presumed negative   for C.difficile toxin B or the number of bacteria present may be below the   limit of detection for the test.  FDA approved assay performed using [employer] GeneXpert real-time PCR.  A negative result does not exclude actual disease due to C. difficile and may   be due to improper collection, handling and storage of the specimen or the   number of organisms in the specimen is below the  detection limit of the assay.           Virology:  CMV viral loads    CMV viral loads  No results found for: 31022, 96276, 22268, 94202, CMVQAL  CMV viral loads    Recent Labs   Lab Test 09/13/20  0612 09/01/20  0658   CSPEC Plasma Plasma   CMVLOG Not Calculated Not Calculated       CMV viral loads    Log IU/mL of CMVQNT   Date Value Ref Range Status   09/13/2020 Not Calculated <2.1 [Log_IU]/mL Final   09/01/2020 Not Calculated <2.1 [Log_IU]/mL Final   08/19/2020 Not Calculated <2.1 [Log_IU]/mL Final   08/13/2020 Not Calculated <2.1 [Log_IU]/mL Final   06/15/2020 Not Calculated <2.1 [Log_IU]/mL Final       CMV resistance testing  No lab results found.  No results found for: CMVCID, CMVFOS, CMVGAN     EBV viral loads     Recent Labs   Lab Test 09/01/20  0658 08/13/20  0840 06/15/20  0826   EBRES EBV DNA Not Detected EBV DNA Not Detected EBV DNA Not Detected     EBV DNA Copies/mL   Date Value Ref Range Status   09/01/2020 EBV DNA Not Detected EBVNEG^EBV DNA Not Detected [Copies]/mL Final   08/13/2020 EBV DNA Not Detected EBVNEG^EBV DNA Not Detected [Copies]/mL Final   06/15/2020 EBV DNA Not Detected EBVNEG^EBV DNA Not Detected [Copies]/mL Final       Human Herpes Virus 6 viral loads    No results found for: H6RES No results found for: H6SPEC    CMV Antibody IgG   Date Value Ref Range Status   05/18/2020 0.3 0.0 - 0.8 AI Final     Comment:     Negative  Antibody index (AI) values reflect qualitative changes in antibody   concentration that cannot be directly associated with clinical condition or   disease state.       Toxoplasma Antibody IgG   Date Value Ref Range Status   05/18/2020 <3.0 0.0 - 7.1 IU/mL Final     Comment:     Negative- Absence of detectable Toxoplasma gondii IgG antibodies. A negative   result does not rule out acute infection.  The magnitude of the measured result is not indicative of the amount of   antibody present. The concentrations of anti-Toxoplasma gondii IgG in a given   specimen determined  with assays from different manufacturers can vary due to   differences in assay methods and reagent specificity.         Pathology:  RUL FNA 9/17/2020  Lung, right upper lobe lesion, endobronchial ultrasound guided fine needle    aspiration:   - Negative for malignancy   TBBx 9/8/2020  FINAL DIAGNOSIS:   A. No specimen received.   B. LUNG, RIGHT UPPER LOBE, ENDOBRONCHIAL BIOPSIES:   - Two small fragments of respiratory mucosa with chronic inflammation,   frequent eosinophils, submucosal edema   and focal squamous metaplasia.   - Negative for granulomatous inflammation or malignancy in this biopsy,     Imaging:  CT chest 10/2/2020  IMPRESSION:  Abnormalities are stable to slightly improved from  9/21/2020.  CT 9/21/2020 reviewed with the radiologist  Impression: Positive treatment response as evidenced by:  1. Moderate decrease in size of right suprahilar mass. This mass has  aggressive features including mediastinal and bronchial invasion as  well as mediastinal lymphadenopathy. These invasive features have  improved.  2. Previously occluded right upper lobe bronchi are now patent,  although remains mildly narrowed.  3. Improvement and bronchocentric groundglass and nodular opacities in  the right lower lobe favored to be infectious.  4. Near complete resolution of right-sided pleural effusion with  resolution of intralobular septal thickening in the right lower lobe.  CT chest 9/13/2020  IMPRESSION:  1. Increased size of the right hilar mass/consolidation with  associated narrowing of the subsegmental right upper lobe bronchioles  and multiple scattered pulmonary nodules as above. Differential  continues to include infection versus malignancy with postobstructive  pneumonia.  2. Increased bibasilar groundglass and right lung tree-in-bud nodular  opacities suggestive of infection, possibly secondary to aspiration.  Small right pleural effusion.  3. Surgical changes of cardiac transplantation.  4. Cholelithiasis  without cholecystitis.  CT chest 9/2/2020   IMPRESSION:   1. New mass vs consolidation in the right upper lobe and right hilum  since 7/29/2020 with narrowing of multiple right upper lobe bronchi.   1a. Differential pneumonia with reactive right hilar lymphadenopathy  vs. malignancy(ex. Lymphoma/PTLD) with post obstructive pneumonia.  2. Stable additional pulmonary lesions as above.  3. Stable postsurgical changes of heart transplant.  4. Cholelithiasis without evidence of cholecystitis.      Again, thank you for allowing me to participate in the care of your patient.      Sincerely,    Tavares Rodriguez MD

## 2020-10-07 DIAGNOSIS — Z94.1 HEART REPLACED BY TRANSPLANT (H): ICD-10-CM

## 2020-10-07 LAB
BACTERIA SPEC CULT: NORMAL
BACTERIA SPEC CULT: NORMAL
Lab: NORMAL
Lab: NORMAL
SPECIMEN SOURCE: NORMAL
SPECIMEN SOURCE: NORMAL

## 2020-10-07 PROCEDURE — 80197 ASSAY OF TACROLIMUS: CPT | Performed by: INTERNAL MEDICINE

## 2020-10-08 LAB
TACROLIMUS BLD-MCNC: 7.8 UG/L (ref 5–15)
TME LAST DOSE: NORMAL H

## 2020-10-09 ENCOUNTER — TELEPHONE (OUTPATIENT)
Dept: TRANSPLANT | Facility: CLINIC | Age: 60
End: 2020-10-09

## 2020-10-09 ENCOUNTER — TELEPHONE (OUTPATIENT)
Dept: ENDOCRINOLOGY | Facility: CLINIC | Age: 60
End: 2020-10-09

## 2020-10-09 DIAGNOSIS — Z79.4 TYPE 2 DIABETES MELLITUS WITH HYPERGLYCEMIA, WITH LONG-TERM CURRENT USE OF INSULIN (H): ICD-10-CM

## 2020-10-09 DIAGNOSIS — E11.65 TYPE 2 DIABETES MELLITUS WITH HYPERGLYCEMIA, WITH LONG-TERM CURRENT USE OF INSULIN (H): ICD-10-CM

## 2020-10-09 RX ORDER — PEN NEEDLE, DIABETIC 32GX 5/32"
NEEDLE, DISPOSABLE MISCELLANEOUS
Qty: 600 EACH | Refills: 3 | Status: SHIPPED | OUTPATIENT
Start: 2020-10-09 | End: 2021-11-23

## 2020-10-09 NOTE — TELEPHONE ENCOUNTER
Pt called with lab results from this week.  BMP stable.  Tacrolimus level 7.8.  Goal 8-10.  No dose change at this time.  Pt also notified that his chest CT has been scheduled before his appointments on 10/29/20.  Pt states understanding.

## 2020-10-09 NOTE — TELEPHONE ENCOUNTER
Need new Rx for BD Pen Needle Corin 32G 4MM  Pt states using 6 times a day instead of 4 times a day    Thank you!  Azul Davison CPhT  [facility]

## 2020-10-12 NOTE — TELEPHONE ENCOUNTER
CLINIC COORDINATOR SCHEDULING NOTES     CALL RESULT: LVM x2     APPT TYPE: VIRTUAL VISIT RETURN     PROVIDER: Malachi JOE     DATE APPT NEEDED: 1st available     ADDITIONAL NOTES: hospital follow-up

## 2020-10-13 DIAGNOSIS — Z94.1 HEART REPLACED BY TRANSPLANT (H): ICD-10-CM

## 2020-10-13 LAB
FUNGUS SPEC CULT: NORMAL
SPECIMEN SOURCE: NORMAL

## 2020-10-13 RX ORDER — HYDRALAZINE HYDROCHLORIDE 50 MG/1
100 TABLET, FILM COATED ORAL 3 TIMES DAILY
Qty: 540 TABLET | Refills: 3 | Status: SHIPPED | OUTPATIENT
Start: 2020-10-13 | End: 2021-11-23

## 2020-10-13 NOTE — TELEPHONE ENCOUNTER
PHYSICAL THERAPY - DAILY TREATMENT NOTE Patient Name: Pollo Saini        Date: 2018 : 1965   YES Patient  Verified Visit #:   3   of   12  Insurance: Payor: DALIA / Plan: [place] [address] / Product Type: [place] Side / In time: 336 Out time: 434 Total Treatment Time: 62 Medicare Time Tracking (below) Total Timed Codes (min):  na 1:1 Treatment Time:  na  
TREATMENT AREA =  Right hip pain [M25.551] SUBJECTIVE Pain Level (on 0 to 10 scale):  2  / 10 Medication Changes/New allergies or changes in medical history, any new surgeries or procedures? NO    If yes, update Summary List  
Subjective Functional Status/Changes:  []  No changes reported Patient reports his R hip was \"inflammed\" after his last session, but by the weekend it eventually settled down. Patient reports compliance with his HEP. OBJECTIVE Modalities Rationale:     decrease inflammation and decrease pain to improve patient's ability to perform work activities. min [] Estim, type/location:   
                                 []  att     []  unatt     []  w/US     []  w/ice    []  w/heat 
 min []  Mechanical Traction: type/lbs   
               []  pro   []  sup   []  int   []  cont    []  before manual    []  after manual  
 min []  Ultrasound, settings/location:    
 min []  Iontophoresis w/ dexamethasone, location:   
                                           []  take home patch       []  in clinic  
10 min [x]  Ice     []  Heat    location/position: To R hip in supine with bolster  
 min []  Vasopneumatic Device, press/temp:   
 min []  Other:   
[x] Skin assessment post-treatment (if applicable):   
[x]  intact    []  redness- no adverse reaction    
[]redness  adverse reaction:     
33 min Therapeutic Exercise:  [x]  See flow sheet Rationale:      increase ROM, increase strength, improve coordination and improve balance to improve the patients ability to perform recreational Refill for Hydralazine [facility] per pt request.   activities. 15 min Manual Therapy: A. R.T to R RF, R IP, L PF; MET correction for R anterior innominate Rationale:      decrease pain, increase ROM, increase tissue extensibility and decrease trigger points to improve patient's ability to perform walking activities. min Patient Education:  YES  Reviewed HEP []  Progressed/Changed HEP based on: Other Objective/Functional Measures: 
 
(+) response to MET correction for R anteriorly rotated innominate-will continue to monitor at upcoming sessions for possible correction Continued tenderness to R IP and L PF during MT Cued on LE positioning during suitecase carry in order to improve form Post Treatment Pain Level (on 0 to 10) scale:   3.5  / 10 ASSESSMENT Assessment/Changes in Function:  
 
Patient noted increased pain post session with noticeable limp as he exited clinic. Patient educated on healing process as well as future POC in order to treat symptoms. Patient acknowledged understanding. []  See Progress Note/Recertification Patient will continue to benefit from skilled PT services to modify and progress therapeutic interventions, address functional mobility deficits, address ROM deficits, address strength deficits, analyze and address soft tissue restrictions, analyze and cue movement patterns, analyze and modify body mechanics/ergonomics and assess and modify postural abnormalities to attain remaining goals. Progress toward goals / Updated goals: 
Met STG#1 PLAN [x]  Upgrade activities as tolerated YES Continue plan of care  
[]  Discharge due to :   
[]  Other:   
 
Therapist: Raulito Soler PT Date: 11/20/2018 Time: 6:05 PM  
 
Future Appointments Date Time Provider [place] 11/27/2018  3:00 PM Thang Solomon PT [facility]  
11/30/2018  8:00 AM Thang Solomon PT [facility]  
12/3/2018  7:30 AM Thang Solomon PT [facility]  
12/7/2018  7:30 AM Chato Munroe, PT [facility] 12/10/2018  7:30 AM Joy Upton, PT [facility]  
12/14/2018  7:30 AM Rebecca Rodarte, PT [facility]

## 2020-10-15 LAB
BACTERIA SPEC CULT: NORMAL
FUNGUS SPEC CULT: NORMAL
SPECIMEN SOURCE: NORMAL
SPECIMEN SOURCE: NORMAL

## 2020-10-21 ENCOUNTER — TELEPHONE (OUTPATIENT)
Dept: INFECTIOUS DISEASES | Facility: CLINIC | Age: 60
End: 2020-10-21

## 2020-10-22 ENCOUNTER — PRE VISIT (OUTPATIENT)
Dept: TRANSPLANT | Facility: CLINIC | Age: 60
End: 2020-10-22

## 2020-10-22 DIAGNOSIS — Z94.1 HEART REPLACED BY TRANSPLANT (H): Primary | ICD-10-CM

## 2020-10-22 DIAGNOSIS — Z13.220 LIPID SCREENING: ICD-10-CM

## 2020-10-22 DIAGNOSIS — E11.9 DIABETES MELLITUS, TYPE 2 (H): ICD-10-CM

## 2020-10-22 RX ORDER — LIDOCAINE 40 MG/G
CREAM TOPICAL
Status: CANCELLED | OUTPATIENT
Start: 2020-10-22

## 2020-10-27 DIAGNOSIS — Z94.1 STATUS POST HEART TRANSPLANTATION (H): ICD-10-CM

## 2020-10-27 RX ORDER — INSULIN ASPART 100 [IU]/ML
INJECTION, SOLUTION INTRAVENOUS; SUBCUTANEOUS
Qty: 15 ML | Refills: 4 | Status: SHIPPED | OUTPATIENT
Start: 2020-10-27 | End: 2020-11-27

## 2020-10-27 NOTE — TELEPHONE ENCOUNTER
NOVOLOG FLEXPEN 100UNIT/ML SOPN  INJECT 1 TO 14 UNITS UNDER THE SKIN  WITH MEALS AND 1 TO 11 UNITS AT BEDTIME PER SLIDING SCALES, AND INJECT 1 UNIT UNDER THE SKIN PER 5 GRAMS OF CARBOHYDRATES WITH MEALS AND SNACKS  Last Written Prescription Date:  9/24/20  Last Fill Quantity: 3 ml ,   # refills: 0  Last Office Visit : 7/31/20 9/8/20 [facility] cancelled/ inpatient  Future Office visit:  None    Routing refill request to provider for review/approval because:  Insulin - refilled per clinic Last ordered by cardiology at hospital discharge    Scheduling has been notified to contact the pt for appointment.

## 2020-10-28 ENCOUNTER — TELEPHONE (OUTPATIENT)
Dept: CARDIOLOGY | Facility: CLINIC | Age: 60
End: 2020-10-28

## 2020-10-28 NOTE — TELEPHONE ENCOUNTER
Call complete for pre procedure reminder, travel screen and updated visitor policy.  Per heart transplant criteria, Covid test not done.

## 2020-10-29 ENCOUNTER — APPOINTMENT (OUTPATIENT)
Dept: MEDSURG UNIT | Facility: CLINIC | Age: 60
End: 2020-10-29
Attending: INTERNAL MEDICINE
Payer: COMMERCIAL

## 2020-10-29 ENCOUNTER — HOSPITAL ENCOUNTER (OUTPATIENT)
Facility: CLINIC | Age: 60
Discharge: HOME | End: 2020-10-29
Attending: INTERNAL MEDICINE | Admitting: INTERNAL MEDICINE
Payer: COMMERCIAL

## 2020-10-29 ENCOUNTER — OFFICE VISIT (OUTPATIENT)
Dept: CARDIOLOGY | Facility: CLINIC | Age: 60
End: 2020-10-29
Attending: INTERNAL MEDICINE
Payer: COMMERCIAL

## 2020-10-29 ENCOUNTER — ANCILLARY PROCEDURE (OUTPATIENT)
Dept: CT IMAGING | Facility: CLINIC | Age: 60
End: 2020-10-29
Attending: INTERNAL MEDICINE
Payer: COMMERCIAL

## 2020-10-29 ENCOUNTER — RESULTS ONLY (OUTPATIENT)
Dept: OTHER | Facility: CLINIC | Age: 60
End: 2020-10-29

## 2020-10-29 VITALS
WEIGHT: 185 LBS | OXYGEN SATURATION: 98 % | BODY MASS INDEX: 31.58 KG/M2 | HEART RATE: 119 BPM | SYSTOLIC BLOOD PRESSURE: 117 MMHG | DIASTOLIC BLOOD PRESSURE: 81 MMHG | HEIGHT: 64 IN

## 2020-10-29 VITALS
HEART RATE: 122 BPM | RESPIRATION RATE: 20 BRPM | DIASTOLIC BLOOD PRESSURE: 98 MMHG | HEIGHT: 64 IN | WEIGHT: 185 LBS | BODY MASS INDEX: 31.58 KG/M2 | SYSTOLIC BLOOD PRESSURE: 147 MMHG | TEMPERATURE: 98.3 F | OXYGEN SATURATION: 98 %

## 2020-10-29 DIAGNOSIS — J69.0 ASPIRATION PNEUMONIA OF RIGHT MIDDLE LOBE, UNSPECIFIED ASPIRATION PNEUMONIA TYPE (H): ICD-10-CM

## 2020-10-29 DIAGNOSIS — N41.1 CHRONIC PROSTATITIS WITHOUT HEMATURIA: ICD-10-CM

## 2020-10-29 DIAGNOSIS — Z94.1 HEART REPLACED BY TRANSPLANT (H): ICD-10-CM

## 2020-10-29 DIAGNOSIS — Z13.220 LIPID SCREENING: ICD-10-CM

## 2020-10-29 DIAGNOSIS — E83.42 HYPOMAGNESEMIA: ICD-10-CM

## 2020-10-29 DIAGNOSIS — R91.8 MASS OF UPPER LOBE OF RIGHT LUNG: ICD-10-CM

## 2020-10-29 DIAGNOSIS — E11.9 DIABETES MELLITUS, TYPE 2 (H): ICD-10-CM

## 2020-10-29 DIAGNOSIS — Z94.1 HEART REPLACED BY TRANSPLANT (H): Primary | ICD-10-CM

## 2020-10-29 DIAGNOSIS — Z94.1 STATUS POST HEART TRANSPLANTATION (H): ICD-10-CM

## 2020-10-29 DIAGNOSIS — Z94.1 STATUS POST HEART TRANSPLANTATION (H): Primary | ICD-10-CM

## 2020-10-29 DIAGNOSIS — R91.8 LUNG MASS: ICD-10-CM

## 2020-10-29 LAB
ALBUMIN SERPL-MCNC: 4 G/DL (ref 3.4–5)
ALP SERPL-CCNC: 64 U/L (ref 40–150)
ALT SERPL W P-5'-P-CCNC: 17 U/L (ref 0–70)
ANION GAP SERPL CALCULATED.3IONS-SCNC: 8 MMOL/L (ref 3–14)
AST SERPL W P-5'-P-CCNC: 22 U/L (ref 0–45)
BASOPHILS # BLD AUTO: 0 10E9/L (ref 0–0.2)
BASOPHILS NFR BLD AUTO: 1.4 %
BILIRUB SERPL-MCNC: 0.8 MG/DL (ref 0.2–1.3)
BUN SERPL-MCNC: 44 MG/DL (ref 7–30)
CALCIUM SERPL-MCNC: 9.2 MG/DL (ref 8.5–10.1)
CHLORIDE SERPL-SCNC: 106 MMOL/L (ref 94–109)
CHOLEST SERPL-MCNC: 161 MG/DL
CK SERPL-CCNC: 69 U/L (ref 30–300)
CO2 SERPL-SCNC: 23 MMOL/L (ref 20–32)
CREAT SERPL-MCNC: 1.89 MG/DL (ref 0.66–1.25)
DIFFERENTIAL METHOD BLD: ABNORMAL
EOSINOPHIL # BLD AUTO: 0 10E9/L (ref 0–0.7)
EOSINOPHIL NFR BLD AUTO: 1.4 %
ERYTHROCYTE [DISTWIDTH] IN BLOOD BY AUTOMATED COUNT: 15.8 % (ref 10–15)
GFR SERPL CREATININE-BSD FRML MDRD: 38 ML/MIN/{1.73_M2}
GLUCOSE SERPL-MCNC: 157 MG/DL (ref 70–99)
HBA1C MFR BLD: 6.2 % (ref 0–5.6)
HCT VFR BLD AUTO: 32.8 % (ref 40–53)
HDLC SERPL-MCNC: 51 MG/DL
HGB BLD-MCNC: 10.3 G/DL (ref 13.3–17.7)
IMM GRANULOCYTES # BLD: 0 10E9/L (ref 0–0.4)
IMM GRANULOCYTES NFR BLD: 1.9 %
LDLC SERPL CALC-MCNC: 81 MG/DL
LYMPHOCYTES # BLD AUTO: 0.8 10E9/L (ref 0.8–5.3)
LYMPHOCYTES NFR BLD AUTO: 37.3 %
MAGNESIUM SERPL-MCNC: 1.5 MG/DL (ref 1.6–2.3)
MCH RBC QN AUTO: 29.3 PG (ref 26.5–33)
MCHC RBC AUTO-ENTMCNC: 31.4 G/DL (ref 31.5–36.5)
MCV RBC AUTO: 93 FL (ref 78–100)
MONOCYTES # BLD AUTO: 0.2 10E9/L (ref 0–1.3)
MONOCYTES NFR BLD AUTO: 11.5 %
NEUTROPHILS # BLD AUTO: 1 10E9/L (ref 1.6–8.3)
NEUTROPHILS NFR BLD AUTO: 46.5 %
NONHDLC SERPL-MCNC: 110 MG/DL
NRBC # BLD AUTO: 0 10*3/UL
NRBC BLD AUTO-RTO: 0 /100
PHOSPHATE SERPL-MCNC: 4.1 MG/DL (ref 2.5–4.5)
PLATELET # BLD AUTO: 225 10E9/L (ref 150–450)
POTASSIUM SERPL-SCNC: 4.1 MMOL/L (ref 3.4–5.3)
PROT SERPL-MCNC: 7.5 G/DL (ref 6.8–8.8)
RBC # BLD AUTO: 3.51 10E12/L (ref 4.4–5.9)
SODIUM SERPL-SCNC: 137 MMOL/L (ref 133–144)
TACROLIMUS BLD-MCNC: 6.8 UG/L (ref 5–15)
TME LAST DOSE: NORMAL H
TRIGL SERPL-MCNC: 144 MG/DL
WBC # BLD AUTO: 2.1 10E9/L (ref 4–11)

## 2020-10-29 PROCEDURE — 99214 OFFICE O/P EST MOD 30 MIN: CPT | Performed by: NURSE PRACTITIONER

## 2020-10-29 PROCEDURE — C1894 INTRO/SHEATH, NON-LASER: HCPCS | Performed by: INTERNAL MEDICINE

## 2020-10-29 PROCEDURE — 82550 ASSAY OF CK (CPK): CPT | Performed by: PATHOLOGY

## 2020-10-29 PROCEDURE — 80061 LIPID PANEL: CPT | Performed by: PATHOLOGY

## 2020-10-29 PROCEDURE — 83735 ASSAY OF MAGNESIUM: CPT | Performed by: PATHOLOGY

## 2020-10-29 PROCEDURE — 272N000001 HC OR GENERAL SUPPLY STERILE: Performed by: INTERNAL MEDICINE

## 2020-10-29 PROCEDURE — 88346 IMFLUOR 1ST 1ANTB STAIN PX: CPT | Mod: 26 | Performed by: PATHOLOGY

## 2020-10-29 PROCEDURE — G0463 HOSPITAL OUTPT CLINIC VISIT: HCPCS

## 2020-10-29 PROCEDURE — 86832 HLA CLASS I HIGH DEFIN QUAL: CPT | Mod: 90 | Performed by: PATHOLOGY

## 2020-10-29 PROCEDURE — 80053 COMPREHEN METABOLIC PANEL: CPT | Performed by: PATHOLOGY

## 2020-10-29 PROCEDURE — 250N000009 HC RX 250: Performed by: INTERNAL MEDICINE

## 2020-10-29 PROCEDURE — 93505 ENDOMYOCARDIAL BIOPSY: CPT | Mod: 26 | Performed by: INTERNAL MEDICINE

## 2020-10-29 PROCEDURE — 80197 ASSAY OF TACROLIMUS: CPT | Mod: 90 | Performed by: PATHOLOGY

## 2020-10-29 PROCEDURE — 85027 COMPLETE CBC AUTOMATED: CPT | Performed by: PATHOLOGY

## 2020-10-29 PROCEDURE — 88350 IMFLUOR EA ADDL 1ANTB STN PX: CPT | Mod: 26 | Performed by: PATHOLOGY

## 2020-10-29 PROCEDURE — 88307 TISSUE EXAM BY PATHOLOGIST: CPT | Mod: TC | Performed by: INTERNAL MEDICINE

## 2020-10-29 PROCEDURE — 88307 TISSUE EXAM BY PATHOLOGIST: CPT | Mod: 26 | Performed by: PATHOLOGY

## 2020-10-29 PROCEDURE — 93505 ENDOMYOCARDIAL BIOPSY: CPT | Performed by: INTERNAL MEDICINE

## 2020-10-29 PROCEDURE — 36415 COLL VENOUS BLD VENIPUNCTURE: CPT | Performed by: PATHOLOGY

## 2020-10-29 PROCEDURE — 88346 IMFLUOR 1ST 1ANTB STAIN PX: CPT | Mod: TC | Performed by: INTERNAL MEDICINE

## 2020-10-29 PROCEDURE — 71250 CT THORAX DX C-: CPT | Mod: GC | Performed by: RADIOLOGY

## 2020-10-29 PROCEDURE — 86352 CELL FUNCTION ASSAY W/STIM: CPT | Mod: 90 | Performed by: PATHOLOGY

## 2020-10-29 PROCEDURE — 86833 HLA CLASS II HIGH DEFIN QUAL: CPT | Mod: 90 | Performed by: PATHOLOGY

## 2020-10-29 PROCEDURE — 999N000132 HC STATISTIC PP CARE STAGE 1

## 2020-10-29 PROCEDURE — 83036 HEMOGLOBIN GLYCOSYLATED A1C: CPT | Performed by: PATHOLOGY

## 2020-10-29 PROCEDURE — 99000 SPECIMEN HANDLING OFFICE-LAB: CPT | Performed by: PATHOLOGY

## 2020-10-29 PROCEDURE — 88350 IMFLUOR EA ADDL 1ANTB STN PX: CPT | Mod: TC | Performed by: INTERNAL MEDICINE

## 2020-10-29 PROCEDURE — 87799 DETECT AGENT NOS DNA QUANT: CPT | Mod: 90 | Performed by: PATHOLOGY

## 2020-10-29 PROCEDURE — 84100 ASSAY OF PHOSPHORUS: CPT | Performed by: PATHOLOGY

## 2020-10-29 RX ORDER — LIDOCAINE 40 MG/G
CREAM TOPICAL
Status: COMPLETED | OUTPATIENT
Start: 2020-10-29 | End: 2020-10-29

## 2020-10-29 RX ADMIN — LIDOCAINE: 40 CREAM TOPICAL at 13:36

## 2020-10-29 ASSESSMENT — PAIN SCALES - GENERAL: PAINLEVEL: NO PAIN (0)

## 2020-10-29 ASSESSMENT — MIFFLIN-ST. JEOR
SCORE: 1570.15
SCORE: 1570.15

## 2020-10-29 NOTE — LETTER
10/29/2020      RE: Mariusz Sharp  [address] [facility] [address]       Dear Colleague,    Thank you for the opportunity to participate in the care of your patient, Mariusz Sharp, at the [facility] HEART [facility] at [facility]. Please see a copy of my visit note below.    ADULT HEART TRANSPLANT     HPI:   Mr. Sharp is a 59 year old male who presents today after biopsy for routine heart transplant follow-up. Patient had a history of severe heart failure with reduced ejection fraction secondary to ischemic cardiomyopathy who had underwent HeartMate 3 LVAD placement in 2018, complicated by monomorphic VT with ICD shocks, as well as chronic kidney disease paroxysmal atrial fibrillation and elevated prostate serum antigen.  He underwent heart transplant May 18, 2020.  Since his transplantation he has had normal graft function, with largely normal right heart catheterizations (occasionally may have elevated wedge pressures), but with normal outputs and without DSAs.  His biopsies have remained negative for significant rejection.  His postop course was complicated by some nonsustained VT, with some leukocytosis thought to be due to c acnes growing from his former LVAD site.  This was thought to be a possible contaminant but it was decided to still treat him with antibiotics for a full course.  His donor blood also grew  S anginosus and Veillonella, and this tube was also thought to be a contaminant but was also treated with a full course of antibiotics. Patient was last admitted 8/31 for 9/24 for fever, cough, diarrhea, and syncope. Found to have a post-obstructive versus aspiration pneumonia, RUL/suprahilar mass +abiotrophia defectiva, narrowing of multiple RUL bronchi. Initially treated as fungal infection given +fungitell but clinically didn't improve and the RUL mass did not change. Ultimately, tissue culture from RUL mass showed abiotrophia defectiva which is an  oral microbe. This was felt to be possible contaminant but given it's response to abx (CT improved, clinically improved) so plan was continue to treat and repeat a chest CT with close f/u with transplant ID. His MMF was decreased to 500 mg bid, Immuknow noted to be low. He was seen by transplant ID virtually since discharge, plan was to repeat chest CT today. Stopped antibiotics on 10/11.    Since hospital discharge patient reports feeling okay.  He is participating in cardiac rehab and is slowly improving.  He does still feel tired and weaker than prior to admission.  He continues to have a dry cough but this is slightly improved.  Denies any fever or productive cough.  His blood pressures have been 110s over 70s.  He notes mild leg swelling worse when he eats sodium and at night.  Describes a new dull pain in his stomach.  Denies any diarrhea, constipation, nausea, vomiting.      PAST MEDICAL HISTORY:  Past Medical History:   Diagnosis Date     Acute kidney injury (H)      CKD (chronic kidney disease)      Coronary artery disease      Diabetes mellitus (H)      HTN (hypertension)      Hyperlipidemia      ICD (implantable cardioverter-defibrillator), single chamber- NOT dependent 7/17/2018     Ischemic cardiomyopathy      LV (left ventricular) mural thrombus      Paroxysmal atrial flutter (H)      RVF (right ventricular failure) (H)      Systolic heart failure (H)      Ventricular tachycardia (H)      CURRENT MEDICATIONS:       ACCU-CHEK [place]TE PLUS test strip, Check BG 3 times daily at meals and at bedtime.       acetaminophen (TYLENOL) 325 MG tablet, Take 2 tablets (650 mg) by mouth every 4 hours as needed for other (multimodal surgical pain management along with NSAIDS and opioid medication as indicated based on pain control and physical function.)       amLODIPine (NORVASC) 5 MG tablet, Take 1 tablet (5 mg) by mouth daily       BD SILVANA U/F 32G X 4 MM insulin pen needle, Use 6 times daily.       calcium carbonate  600 mg-vitamin D 400 units (CALTRATE) 600-400 MG-UNIT per tablet, Take 1 tablet by mouth 2 times daily (with meals)       Continuous Blood Gluc  (DEXCOM G6 ) [facility], 1 each daily       Continuous Blood Gluc Sensor (DEXCOM G6 SENSOR) [facility], 1 each every 10 days       Continuous Blood Gluc Transmit (DEXCOM G6 TRANSMITTER) MISC, 1 each every 3 months       hydrALAZINE (APRESOLINE) 50 MG tablet, Take 2 tablets (100 mg) by mouth 3 times daily       insulin aspart (NOVOLOG FLEXPEN) 100 UNIT/ML pen, INJECT 1 TO 14 UNITS UNDER THE SKIN  WITH MEALS AND 1 TO 11 UNITS AT BEDTIME PER SLIDING SCALES, AND INJECT 1 UNIT UNDER THE SKIN PER 5 GRAMS OF CARBOHYDRATES WITH MEALS AND SNACKS       insulin aspart (NOVOLOG PEN) 100 UNIT/ML pen, Inject 1-14 units per custom scale  For Pre-Meal BG less than 140, no additional insulin needed   to 159 give 1 units.    to 179 give 2 units.    to 199 give 3 units.    to 219 give 4 units.    to 239 give 5 units.    to 259 give 6 units.    to 279 give 7 units.    to 299 give 8 units.    to 319 give 9 units.    to 339 give 10 units.    to 359 give 11 units.    to 379 give 12 units.   to 399 give 13 units.  BG >/=400 give 14 units.       insulin aspart (NOVOLOG PEN) 100 UNIT/ML pen, Inject 1-11 Units Subcutaneous At Bedtime Inject 1-11 units at bedtime per custom scale   For Bedtime BG less than 200, no additional insulin needed   to 219 give 1 units.    to 239 give 2 units.    to 259 give 3 units.    to 279 give 4 units.    to 299 give 5 units.    to 319 give 6 units.    to 339 give 7 units.    to 359 give 8 units    to 379 give 9 units.   to 399 give 10 units.  BG >/=400 give 11 units.       insulin glargine (LANTUS PEN) 100 UNIT/ML pen, Inject 24 Units Subcutaneous At Bedtime       magnesium oxide (MAG-OX) 400 (241.3 Mg) MG tablet, Take 1 tablet  "(400 mg) by mouth 2 times daily       multivitamin w/minerals (THERA-VIT-M) tablet, Take 1 tablet by mouth daily       mycophenolate (GENERIC EQUIVALENT) 500 MG tablet, Take 1 tablet (500 mg) by mouth 2 times daily       pantoprazole (PROTONIX) 40 MG EC tablet, Take 1 tablet (40 mg) by mouth every morning (before breakfast)       rosuvastatin (CRESTOR) 10 MG tablet, TAKE ONE TABLET BY MOUTH ONCE DAILY       tacrolimus (GENERIC EQUIVALENT) 1 MG capsule, Take 4mg (4 caps) in the AM and 3mg (3 caps) in the PM.       loperamide (IMODIUM) 2 MG capsule, Take 1 capsule (2 mg) by mouth 4 times daily as needed for diarrhea    No current facility-administered medications on file prior to visit.     ROS:  See HPI    EXAM:    /81 (BP Location: Right arm, Patient Position: Chair, Cuff Size: Adult Regular)   Pulse 119   Ht 1.626 m (5' 4\")   Wt 83.9 kg (185 lb)   SpO2 98%   BMI 31.76 kg/m      General: appears comfortable, alert  Head: normocephalic, atraumatic  Eyes: anicteric sclera, EOMI  Neck: no adenopathy  Orophyarynx: moist mucosa, no lesions, dentition intact  Heart: regular, tachycardic, S1/S2, no murmur, gallop, rub, estimated JVP 8 cm  Lungs: clear, no rales or wheezing  Abdomen: soft, non-tender, bowel sounds present  Extremities: no clubbing, cyanosis or significant edema  Neurological: normal speech and affect, no gross motor deficits    Labs - reviewed with patient in clinic today:  CBC RESULTS:  Lab Results   Component Value Date    WBC 4.4 09/30/2020    RBC 3.13 (L) 09/30/2020    HGB 8.9 (L) 09/30/2020    HCT 29.1 (L) 09/30/2020    MCV 93 09/30/2020    MCH 28.4 09/30/2020    MCHC 30.6 (L) 09/30/2020    RDW 17.6 (H) 09/30/2020     09/30/2020       CMP RESULTS:  Lab Results   Component Value Date     09/30/2020    POTASSIUM 4.6 09/30/2020    CHLORIDE 108 09/30/2020    CO2 20 09/30/2020    ANIONGAP 8 09/30/2020     (H) 09/30/2020    BUN 40 (H) 09/30/2020    CR 1.92 (H) 09/30/2020    " GFRESTIMATED 38 (L) 09/30/2020    GFRESTBLACK 44 (L) 09/30/2020    ARLENE 9.2 09/30/2020    BILITOTAL 0.5 09/30/2020    ALBUMIN 3.1 (L) 09/30/2020    ALKPHOS 69 09/30/2020    ALT 23 09/30/2020    AST 19 09/30/2020        INR RESULTS:  Lab Results   Component Value Date    INR 1.06 08/26/2020       LIPID RESULTS:  Lab Results   Component Value Date    CHOL 113 09/01/2020    HDL 28 (L) 09/01/2020    LDL 57 09/01/2020    TRIG 143 09/01/2020       IMMUNOSUPPRESSANT LEVELS:  Lab Results   Component Value Date    TACROL 7.8 10/07/2020    DOSTAC 10/06 2100 10/07/2020       No components found for: CK  Lab Results   Component Value Date    MAG 1.5 (L) 09/30/2020     Lab Results   Component Value Date    A1C 7.4 (H) 08/19/2020     Lab Results   Component Value Date    PHOS 4.3 09/30/2020     Lab Results   Component Value Date    NTBNP 404 (H) 06/26/2019     Lab Results   Component Value Date    SAITESTMET [facility] 08/13/2020    SAICELL Class I 08/13/2020    [id number] Cw:15 08/13/2020    [id number] Cw:18 08/13/2020    SAIREPCOM  08/13/2020      Test performed by modified procedure. Serum heat inactivated and tested   by a modified ([facility]) protocol including fetal calf serum addition.   High-risk, mfi >3,000. Mod-risk, mfi 500-3,000.       Lab Results   Component Value Date    SAIITESTME [facility] 08/13/2020    SAIICELL Class II 08/13/2020    [id number] None 08/13/2020    [id number] None 08/13/2020    SAIIREPCOM  08/13/2020      Test performed by modified procedure. Serum heat inactivated and tested   by a modified ([facility]) protocol including fetal calf serum addition.   High-risk, mfi >3,000. Mod-risk, mfi 500-3,000.       Lab Results   Component Value Date    CSPEC Plasma 09/13/2020       Diagnostic Studies:  TTE 6/15/20  Global and regional left ventricular function is normal with an EF of 60-65%.  Right ventricular function, chamber size, wall motion, and thickness are normal.  The inferior vena cava is normal.  No pericardial  effusion is present.    RHC 7/29/20  RA 5  PA 18/12/15  PCWP 12  TD 4.6/2.5    Assessment/Plan:  Mr. Sharp is a 59 year old male who presents y for routine heart transplant follow-up. Patient had a history of severe heart failure with reduced ejection fraction secondary to ischemic cardiomyopathy who had underwent HeartMate 3 LVAD placement in 2018, complicated by monomorphic VT with ICD shocks, as well as chronic kidney disease paroxysmal atrial fibrillation and elevated prostate serum antigen.  He underwent heart transplant May 18, 2020.  Since his transplantation he has had normal graft function, with largely normal right heart catheterizations (occasionally may have elevated wedge pressures), but with normal outputs and without DSAs.  His biopsies have remained negative for significant rejection.  His postop course was complicated by some nonsustained VT, with some leukocytosis thought to be due to c acnes growing from his former LVAD site.  This was thought to be a possible contaminant but it was decided to still treat him with antibiotics for a full course.  His donor blood also grew  S anginosus and Veillonella, and this tube was also thought to be a contaminant but was also treated with a full course of antibiotics.    Recent hospital admission for respiratory condition initially felt to be fungal pneumonia then treated as a post-obstructive versus aspiration pneumonia, RUL/suprahilar mass +abiotrophia defectiva, narrowing of multiple RUL bronchi. Initially treated as fungal infection given +fungitell but clinically didn't improve and the RUL mass did not change. Ultimately, tissue culture from RUL mass showed abiotrophia defectiva which is an oral microbe. This was felt to be possible contaminant but given it's response to abx (CT improved, clinically improved) so plan was continue to treat and repeat a chest CT with close f/u with transplant ID. His MMF was decreased to 500 mg bid, Immuknow noted to be  low.    # Status post OHT on 5/18/20, history of ICM  # Chronic immunosuppression  * Rejection history: none.   * Recent immunosuppression changes: MMF decreased while in hospital  * Last biopsy: 9/30 [facility]  * Intolerance to medications: none     Immunosuppression:  -  mg bid (lower due to infection)  - tacrolimus goal level 8-10, pending today  - Immuknow pending today, last 147     PPx:  - CAV: aspirin 81mg daily, crestor 10mg daily  - CMV: Valcyte 450mg daily (renally dosed), x6 months (through 11/2020), will STOP early today given leukopenia and recheck CMV PCR next week  - Osteoporosis: calcium/vitamin D   - GI: pantoprazole 40mg daily     Serostatus: CMV D+/R-  EBV D-/R+ Toxo D-/R-     #post-obstructive versus aspiration pneumonia, RUL/suprahilar mass +abiotrophia defectiva  Following with transplant ID closely. Chest CT today pending. Lowered IS as above.     #Leukopenia  WBC 2.1 ANC 1 today, stop valcyte today and recheck CBC and CMV next week.     #Hypertension  At goal on amlodipine 5 mg daily and hydralazine 100 mg tid. Continue to monitor as outpatient.     #Hypomagnesium  400 mg bid supplement.     #DM Type II  Following with endocrinology      #Elevated PSA  Prostate MRI showed signs of BPH. He has been referred to urology.    Dinora Harper DNP, NP-C  10/28/2020      Please do not hesitate to contact me if you have any questions/concerns.     Sincerely,     CARMEN Ibarra CNP

## 2020-10-29 NOTE — PATIENT INSTRUCTIONS
Patient Instructions  1. Stop taking Valcyte.  2. Have your CBC and CMV checked in one week locally.  Angelika will send orders to Jessica.  3. Angelika will send message to diabetes and infectious disease MD to set up a follow up appointment.  4. Continue to monitor your BP.  If you continue to have lower readings, call Angelika, and we can adjust your blood pressure meds.      Next transplant clinic appointment:  In two months or sooner if needed.  Next lab draw: Next week for CMV level  Coordinator will call with all pending results.     Please call transplant coordinator with any questions:    Angelika Muller RN BSN   Post Heart Transplant Nurse Coordinator  [facility]  Questions: [phone]

## 2020-10-29 NOTE — NURSING NOTE
Transplant Coordinator Note    Reason for visit: 6 month visit  Coordinator: Present   Caregiver:  None present today    Health concerns addressed today:  1. WBC low- plan to stop Valcyte today.  2. Still having a cough- CT looks better- weak still.  3. Some fluid retention in the ankles.      Immunosuppressants:  MMF 500mg BID  Tacrolimus 4/3        Labs:   WBC low today- stopping Valcyte.          Labs, CT reviewed with patient  Medication record reviewed and reconciled  Questions and concerns addressed  Pt verbalized an understanding of plan of care.     Patient Instructions  1. Stop taking Valcyte.  2. Have your CBC and CMV checked in one week locally.  Angelika will send orders to Jessica.  3. Angelika will send message to diabetes and infectious disease MD to set up a follow up appointment.  4. Continue to monitor your BP.  If you continue to have lower readings, call Angelika, and we can adjust your blood pressure meds.      Next transplant clinic appointment:  In two months or sooner if needed.  Next lab draw: Next week for CMV level  Coordinator will call with all pending results.     Please call transplant coordinator with any questions:    Angelika Muller RN BSN   Post Heart Transplant Nurse Coordinator  [facility]  Questions: [phone]

## 2020-10-29 NOTE — DISCHARGE INSTRUCTIONS
[facility]                        Interventional Cardiology  Discharge Instructions   Post Right Heart Cath/Endomyocardial Biopsy      AFTER YOU GO HOME:    DO drink plenty of fluids    DO resume your regular diet and medications unless otherwise instructed by your Primary Physician    Do Not scrub the procedure site vigorously    No lotion or powder to the puncture site for 3 days    CALL YOUR PRIMARY PHYSICIAN IF: You may resume all normal activity.  Monitor neck site for bleeding, swelling, or voice changes. If you notice bleeding or swelling immediately apply pressure to the site and call number below to speak with Cardiology Fellow.  If you experience any changes in your breathing you should call your doctor immediately or come to the closest Emergency Department.  Do not drive yourself.    ADDITIONAL INSTRUCTIONS: Medications: You are to resume all home medications including anticoagulation therapy unless otherwise advised by your primary cardiologist or nurse coordinator.    Follow Up: Per your primary cardiology team    If you have any questions or concerns regarding your procedure site please call [phone] at anytime and ask for Cardiology Fellow on call.  They are available 24 hours a day.  You may also contact the Cardiology Clinic after hours number at [phone].                                                       Telephone Numbers [phone] Monday-Friday 8:00 am to 4:30 pm    [phone] After 4:30 pm Monday-Friday, Weekends & Holidays  Ask for Interventional Cardiologist on call. Someone is on call 24 hours/day   [facility] toll free number [phone] Monday-Friday 8:00 am to 4:30 pm   [facility] Emergency Dept [phone]

## 2020-10-29 NOTE — IP AVS SNAPSHOT
MRN:[id number]                      After Visit Summary   10/29/2020    Mariusz Sharp    MRN: [id number]           Visit Information        Department      10/29/2020 12:41 PM [facility]          Review of your medicines      UNREVIEWED medicines. Ask your doctor about these medicines       Dose / Directions   acetaminophen 325 MG tablet  Commonly known as: TYLENOL  Used for: Status post heart transplantation (H)      Dose: 650 mg  Take 2 tablets (650 mg) by mouth every 4 hours as needed for other (multimodal surgical pain management along with NSAIDS and opioid medication as indicated based on pain control and physical function.)  Quantity:    Refills: 0     amLODIPine 5 MG tablet  Commonly known as: NORVASC  Used for: Heart replaced by transplant (H)      Dose: 5 mg  Take 1 tablet (5 mg) by mouth daily  Quantity: 90 tablet  Refills: 3     calcium carbonate 600 mg-vitamin D 400 units 600-400 MG-UNIT per tablet  Commonly known as: CALTRATE  Used for: Status post heart transplantation (H)      Dose: 1 tablet  Take 1 tablet by mouth 2 times daily (with meals)  Quantity:    Refills: 0     hydrALAZINE 50 MG tablet  Commonly known as: APRESOLINE  Used for: Heart replaced by transplant (H)      Dose: 100 mg  Take 2 tablets (100 mg) by mouth 3 times daily  Quantity: 540 tablet  Refills: 3     * insulin aspart 100 UNIT/ML pen  Commonly known as: NovoLOG PEN  Used for: Status post heart transplantation (H)      Dose: 1-11 Units  Inject 1-11 Units Subcutaneous At Bedtime Inject 1-11 units at bedtime per custom scale   For Bedtime BG less than 200, no additional insulin needed   to 219 give 1 units.    to 239 give 2 units.    to 259 give 3 units.    to 279 give 4 units.    to 299 give 5 units.    to 319 give 6 units.    to 339 give 7 units.    to 359 give 8 units    to 379 give 9 units.   to  399 give 10 units.  BG >/=400 give 11 units.  Quantity: 3 mL  Refills: 0     * NovoLOG FLEXPEN 100 UNIT/ML pen  Used for: Status post heart transplantation (H)  Generic drug: insulin aspart      INJECT 1 TO 14 UNITS UNDER THE SKIN  WITH MEALS AND 1 TO 11 UNITS AT BEDTIME PER SLIDING SCALES, AND INJECT 1 UNIT UNDER THE SKIN PER 5 GRAMS OF CARBOHYDRATES WITH MEALS AND SNACKS  Quantity: 15 mL  Refills: 4     * insulin aspart 100 UNIT/ML pen  Commonly known as: NovoLOG PEN  Used for: Status post heart transplantation (H)      Inject 1-14 units per custom scale  For Pre-Meal BG less than 140, no additional insulin needed   to 159 give 1 units.    to 179 give 2 units.    to 199 give 3 units.    to 219 give 4 units.    to 239 give 5 units.    to 259 give 6 units.    to 279 give 7 units.    to 299 give 8 units.    to 319 give 9 units.    to 339 give 10 units.    to 359 give 11 units.    to 379 give 12 units.   to 399 give 13 units.  BG >/=400 give 14 units.  Quantity: 3 mL  Refills: 0     insulin glargine 100 UNIT/ML pen  Commonly known as: LANTUS PEN  Used for: Status post heart transplantation (H)      Dose: 24 Units  Inject 24 Units Subcutaneous At Bedtime  Quantity: 15 mL  Refills: 3     loperamide 2 MG capsule  Commonly known as: IMODIUM  Used for: Diarrhea, unspecified type      Dose: 2 mg  Take 1 capsule (2 mg) by mouth 4 times daily as needed for diarrhea  Quantity: 60 capsule  Refills: 0     magnesium oxide 400 (241.3 Mg) MG tablet  Commonly known as: MAG-OX  Used for: Hypomagnesemia      Dose: 400 mg  Take 1 tablet (400 mg) by mouth 2 times daily  Quantity: 90 tablet  Refills: 3     multivitamin w/minerals tablet  Used for: Lung mass, Heart replaced by transplant (H), Diarrhea, unspecified type      Dose: 1 tablet  Take 1 tablet by mouth daily  Quantity: 90 tablet  Refills: 3     mycophenolate 500 MG tablet  Commonly known as: GENERIC  EQUIVALENT  Used for: Status post heart transplantation (H)      Dose: 500 mg  Take 1 tablet (500 mg) by mouth 2 times daily  Quantity: 180 tablet  Refills: 11     pantoprazole 40 MG EC tablet  Commonly known as: PROTONIX  Used for: Heart replaced by transplant (H)      Dose: 40 mg  Take 1 tablet (40 mg) by mouth every morning (before breakfast)  Quantity: 90 tablet  Refills: 3     rosuvastatin 10 MG tablet  Commonly known as: CRESTOR  Used for: Status post heart transplantation (H)      TAKE ONE TABLET BY MOUTH ONCE DAILY  Quantity: 90 tablet  Refills: 3     tacrolimus 1 MG capsule  Commonly known as: GENERIC EQUIVALENT  Used for: Status post heart transplantation (H)      Take 4mg (4 caps) in the AM and 3mg (3 caps) in the PM.  Quantity: 180 capsule  Refills: 11         * This list has 3 medication(s) that are the same as other medications prescribed for you. Read the directions carefully, and ask your doctor or other care provider to review them with you.            CONTINUE these medicines which have NOT CHANGED       Dose / Directions   Accu-Chek Denise Plus test strip  Used for: Status post heart transplantation (H)  Generic drug: blood glucose      Check BG 3 times daily at meals and at bedtime.  Quantity: 300 strip  Refills: 0     BD SILVANA U/F 32G X 4 MM miscellaneous  Used for: Type 2 diabetes mellitus with hyperglycemia, with long-term current use of insulin (H)  Generic drug: insulin pen needle      Use 6 times daily.  Quantity: 600 each  Refills: 3     Dexcom G6  Monie  Used for: Type 2 diabetes mellitus with hyperglycemia, with long-term current use of insulin (H)      Dose: 1 each  1 each daily  Quantity: 1 Device  Refills: 1     Dexcom G6 Sensor Misc  Used for: Type 2 diabetes mellitus with hyperglycemia, with long-term current use of insulin (H)      Dose: 1 each  1 each every 10 days  Quantity: 9 each  Refills: 3     Dexcom G6 Transmitter Misc  Used for: Type 2 diabetes mellitus with  hyperglycemia, with long-term current use of insulin (H)      Dose: 1 each  1 each every 3 months  Quantity: 1 each  Refills: 3              Protect others around you: Learn how to safely use, store and throw away your medicines at www.disposemymeds.org.       Follow-ups after your visit       Your next 10 appointments already scheduled    Jan 07, 2021 10:00 AM  (Arrive by 9:45 AM)  Video Visit with Donny Gomes MD  [facility] [place] ([facility] [place]) [address] [place] [address]  4th Floor  [facility] [address] [phone]   [facility] Urology [facility]  Note: this is not an onsite visit; there is no need to come to the facility.  Please have a list of all current medications available for appointment.         Care Instructions       Further instructions from your care team       [facility]                        Interventional Cardiology  Discharge Instructions   Post Right Heart Cath/Endomyocardial Biopsy      AFTER YOU GO HOME:    DO drink plenty of fluids    DO resume your regular diet and medications unless otherwise instructed by your Primary Physician    Do Not scrub the procedure site vigorously    No lotion or powder to the puncture site for 3 days    CALL YOUR PRIMARY PHYSICIAN IF: You may resume all normal activity.  Monitor neck site for bleeding, swelling, or voice changes. If you notice bleeding or swelling immediately apply pressure to the site and call number below to speak with Cardiology Fellow.  If you experience any changes in your breathing you should call your doctor immediately or come to the closest Emergency Department.  Do not drive yourself.    ADDITIONAL INSTRUCTIONS: Medications: You are to resume all home medications including anticoagulation therapy unless otherwise advised by your primary cardiologist or nurse coordinator.    Follow Up: Per your primary cardiology team    If you have any  "questions or concerns regarding your procedure site please call [phone] at anytime and ask for Cardiology Fellow on call.  They are available 24 hours a day.  You may also contact the Cardiology Clinic after hours number at [phone].                                                       Telephone Numbers [phone] Monday-Friday 8:00 am to 4:30 pm    [phone] After 4:30 pm Monday-Friday, Weekends & Holidays  Ask for Interventional Cardiologist on call. Someone is on call 24 hours/day   [facility] toll free number [phone] Monday-Friday 8:00 am to 4:30 pm   [facility] Emergency Dept [phone]                   Additional Information About Your Visit       [employer] Information    [employer] gives you secure access to your electronic health record. If you see a primary care provider, you can also send messages to your care team and make appointments. If you have questions, please call your primary care clinic.  If you do not have a primary care provider, please call [phone] and they will assist you.       Care EveryWhere ID    This is your Care EveryWhere ID. This could be used by other organizations to access your [facility] medical records  [id number]       Your Vitals Were  Most recent update: 10/29/2020  3:57 PM    Blood Pressure   115/82 (BP Location: Right arm)          Pulse   117          Temperature   98.3  F (36.8  C) (Oral)          Respirations   18          Height   1.626 m (5' 4\")             Weight   83.9 kg (185 lb)    Pulse Oximetry   98%    BMI (Body Mass Index)   31.76 kg/m           Primary Care Provider Office Phone # Fax #    Milad Fry [phone]      Equal Access to Services    [facility] AH: Hadii aad ku hadasho Soomaali, waaxda luqadaha, qaybta kaalmada adeegyada, mynor gibbons. So [facility] [phone].    ATENCIÓN: Si habla español, tiene a waddell disposición servicios gratuitos de asistencia lingüística. Llame al " [phone].    We comply with applicable federal and state civil rights laws, including the [place] Human Rights Act. We do not discriminate on the basis of race, color, creed, Cheondoism, national origin, marital status, age, disability, sex, sexual orientation, or gender identity.       Thank you!    Thank you for choosing [facility] for your care. Our goal is always to provide you with excellent care. Hearing back from our patients is one way we can continue to improve our services. Please take a few minutes to complete the written survey that you may receive in the mail after you visit with us. Thank you!            Medication List      Medications          Morning Afternoon Evening Bedtime As Needed    Accu-Chek Denise Plus test strip  INSTRUCTIONS: Check BG 3 times daily at meals and at bedtime.  Generic drug: blood glucose                     BD SILVANA U/F 32G X 4 MM miscellaneous  INSTRUCTIONS: Use 6 times daily.  Generic drug: insulin pen needle                     Dexcom G6  Monie  INSTRUCTIONS: 1 each daily                     Dexcom G6 Sensor Misc  INSTRUCTIONS: 1 each every 10 days                     Dexcom G6 Transmitter Misc  INSTRUCTIONS: 1 each every 3 months                       ASK your doctor about these medications          Morning Afternoon Evening Bedtime As Needed    acetaminophen 325 MG tablet  Also known as: TYLENOL  INSTRUCTIONS: Take 2 tablets (650 mg) by mouth every 4 hours as needed for other (multimodal surgical pain management along with NSAIDS and opioid medication as indicated based on pain control and physical function.)                     amLODIPine 5 MG tablet  Also known as: NORVASC  INSTRUCTIONS: Take 1 tablet (5 mg) by mouth daily                     calcium carbonate 600 mg-vitamin D 400 units 600-400 MG-UNIT per tablet  Also known as: CALTRATE  INSTRUCTIONS: Take 1 tablet by mouth 2 times daily (with meals)                     hydrALAZINE 50 MG tablet  Also known as:  APRESOLINE  INSTRUCTIONS: Take 2 tablets (100 mg) by mouth 3 times daily                     * insulin aspart 100 UNIT/ML pen  Also known as: NovoLOG PEN  INSTRUCTIONS: Inject 1-11 Units Subcutaneous At Bedtime Inject 1-11 units at bedtime per custom scale   For Bedtime BG less than 200, no additional insulin needed   to 219 give 1 units.    to 239 give 2 units.    to 259 give 3 units.    to 279 give 4 units.    to 299 give 5 units.    to 319 give 6 units.    to 339 give 7 units.    to 359 give 8 units    to 379 give 9 units.   to 399 give 10 units.  BG >/=400 give 11 units.                     * NovoLOG FLEXPEN 100 UNIT/ML pen  INSTRUCTIONS: INJECT 1 TO 14 UNITS UNDER THE SKIN  WITH MEALS AND 1 TO 11 UNITS AT BEDTIME PER SLIDING SCALES, AND INJECT 1 UNIT UNDER THE SKIN PER 5 GRAMS OF CARBOHYDRATES WITH MEALS AND SNACKS  Generic drug: insulin aspart                     * insulin aspart 100 UNIT/ML pen  Also known as: NovoLOG PEN  INSTRUCTIONS: Inject 1-14 units per custom scale  For Pre-Meal BG less than 140, no additional insulin needed   to 159 give 1 units.    to 179 give 2 units.    to 199 give 3 units.    to 219 give 4 units.    to 239 give 5 units.    to 259 give 6 units.    to 279 give 7 units.    to 299 give 8 units.    to 319 give 9 units.    to 339 give 10 units.    to 359 give 11 units.    to 379 give 12 units.   to 399 give 13 units.  BG >/=400 give 14 units.                     insulin glargine 100 UNIT/ML pen  Also known as: LANTUS PEN  INSTRUCTIONS: Inject 24 Units Subcutaneous At Bedtime  Doctor's comments: If Lantus is not covered by insurance, may substitute Basaglar at same dose and frequency.                       loperamide 2 MG capsule  Also known as: IMODIUM  INSTRUCTIONS: Take 1 capsule (2 mg) by mouth 4 times daily as needed for diarrhea                      magnesium oxide 400 (241.3 Mg) MG tablet  Also known as: MAG-OX  INSTRUCTIONS: Take 1 tablet (400 mg) by mouth 2 times daily                     multivitamin w/minerals tablet  INSTRUCTIONS: Take 1 tablet by mouth daily                     mycophenolate 500 MG tablet  Also known as: GENERIC EQUIVALENT  INSTRUCTIONS: Take 1 tablet (500 mg) by mouth 2 times daily                     pantoprazole 40 MG EC tablet  Also known as: PROTONIX  INSTRUCTIONS: Take 1 tablet (40 mg) by mouth every morning (before breakfast)                     rosuvastatin 10 MG tablet  Also known as: CRESTOR  INSTRUCTIONS: TAKE ONE TABLET BY MOUTH ONCE DAILY                     tacrolimus 1 MG capsule  Also known as: GENERIC EQUIVALENT  INSTRUCTIONS: Take 4mg (4 caps) in the AM and 3mg (3 caps) in the PM.  Doctor's comments: TXP DT 5/18/2020 (Heart) TXP Dischg DT 5/29/2020 DX Heart transplant Z94.1 TX Center [facility] ([place], [place])                        * This list has 3 medication(s) that are the same as other medications prescribed for you. Read the directions carefully, and ask your doctor or other care provider to review them with you.

## 2020-10-29 NOTE — PROGRESS NOTES
[facility]   Interventional Cardiology        Consenting/Education for Right Heart Catheterization/Endomyocardial Biopsy     Patient understands as a part of routine surveillance after heart transplant, we would like to perform a right heart catheterization/endomyocardial biopsy.  This procedure will be performed by the interventional cardiologist.    Patient understands during the portion for right heart catheterization a fine tube (catheter) is put into the vein of the groin/neck.  It is carefully passed along until it reaches the heart and then goes up into the blood vessels of the lungs. This is done to measure a variety of pressures in your heart and can tell us how well the heart is filling and emptying, as well as monitor fluid status. While the catheter is in your neck/groin vein, a  Biopsy forceps  or pincers at the end of the catheter are used to take the tissue samples. This is may be repeated to get enough samples. The biopsy pieces are very small (one to two millimeters).     Patient also understands risks and complications of the procedure which include, but are not limited to bruising/swelling around the incision site, infection, bleeding, allergic reaction to local anesthetic.      Patient verbalized understanding of risks and benefits of the right heart catheterization and endomyocardial biopsy and has elected to proceed with the procedure.       Iraida MORALES, SHAHRZAD  [facility] Interventional Cardiology  [phone]

## 2020-10-29 NOTE — PROGRESS NOTES
Prepped for RH/Bx.  Denies pain.  Plans for self transport.  H & P current.  Labs current.  Consent needed.

## 2020-10-29 NOTE — PROGRESS NOTES
Returned from CCL s/p right heart catheterization.  VSS.  Denies pain.  RIJ site CDI.  Discharge instructions previously reviewed with patient.  Discharged to home.

## 2020-10-29 NOTE — IP AVS SNAPSHOT
[facility]  [address] [place] [id number]  Phone: [phone]                                    After Visit Summary   10/29/2020    Mariusz Sharp    MRN: [id number]           After Visit Summary Signature Page    I have received my discharge instructions, and my questions have been answered. I have discussed any challenges I see with this plan with the nurse or doctor.    ..........................................................................................................................................  Patient/Patient Representative Signature      ..........................................................................................................................................  Patient Representative Print Name and Relationship to Patient    ..................................................               ................................................  Date                                   Time    ..........................................................................................................................................  Reviewed by Signature/Title    ...................................................              ..............................................  Date                                               Time          22EPIC Rev 08/18

## 2020-10-29 NOTE — PROGRESS NOTES
ADULT HEART TRANSPLANT     HPI:   Mr. Sharp is a 59 year old male who presents today after biopsy for routine heart transplant follow-up. Patient had a history of severe heart failure with reduced ejection fraction secondary to ischemic cardiomyopathy who had underwent HeartMate 3 LVAD placement in 2018, complicated by monomorphic VT with ICD shocks, as well as chronic kidney disease paroxysmal atrial fibrillation and elevated prostate serum antigen.  He underwent heart transplant May 18, 2020.  Since his transplantation he has had normal graft function, with largely normal right heart catheterizations (occasionally may have elevated wedge pressures), but with normal outputs and without DSAs.  His biopsies have remained negative for significant rejection.  His postop course was complicated by some nonsustained VT, with some leukocytosis thought to be due to c acnes growing from his former LVAD site.  This was thought to be a possible contaminant but it was decided to still treat him with antibiotics for a full course.  His donor blood also grew  S anginosus and Veillonella, and this tube was also thought to be a contaminant but was also treated with a full course of antibiotics. Patient was last admitted 8/31 for 9/24 for fever, cough, diarrhea, and syncope. Found to have a post-obstructive versus aspiration pneumonia, RUL/suprahilar mass +abiotrophia defectiva, narrowing of multiple RUL bronchi. Initially treated as fungal infection given +fungitell but clinically didn't improve and the RUL mass did not change. Ultimately, tissue culture from RUL mass showed abiotrophia defectiva which is an oral microbe. This was felt to be possible contaminant but given it's response to abx (CT improved, clinically improved) so plan was continue to treat and repeat a chest CT with close f/u with transplant ID. His MMF was decreased to 500 mg bid, Immuknow noted to be low. He was seen by transplant ID virtually since discharge,  plan was to repeat chest CT today. Stopped antibiotics on 10/11.    Since hospital discharge patient reports feeling okay.  He is participating in cardiac rehab and is slowly improving.  He does still feel tired and weaker than prior to admission.  He continues to have a dry cough but this is slightly improved.  Denies any fever or productive cough.  His blood pressures have been 110s over 70s.  He notes mild leg swelling worse when he eats sodium and at night.  Describes a new dull pain in his stomach.  Denies any diarrhea, constipation, nausea, vomiting.      PAST MEDICAL HISTORY:  Past Medical History:   Diagnosis Date     Acute kidney injury (H)      CKD (chronic kidney disease)      Coronary artery disease      Diabetes mellitus (H)      HTN (hypertension)      Hyperlipidemia      ICD (implantable cardioverter-defibrillator), single chamber- NOT dependent 7/17/2018     Ischemic cardiomyopathy      LV (left ventricular) mural thrombus      Paroxysmal atrial flutter (H)      RVF (right ventricular failure) (H)      Systolic heart failure (H)      Ventricular tachycardia (H)      CURRENT MEDICATIONS:       ACCU-CHEK [place] PLUS test strip, Check BG 3 times daily at meals and at bedtime.       acetaminophen (TYLENOL) 325 MG tablet, Take 2 tablets (650 mg) by mouth every 4 hours as needed for other (multimodal surgical pain management along with NSAIDS and opioid medication as indicated based on pain control and physical function.)       amLODIPine (NORVASC) 5 MG tablet, Take 1 tablet (5 mg) by mouth daily       BD SILVANA U/F 32G X 4 MM insulin pen needle, Use 6 times daily.       calcium carbonate 600 mg-vitamin D 400 units (CALTRATE) 600-400 MG-UNIT per tablet, Take 1 tablet by mouth 2 times daily (with meals)       Continuous Blood Gluc  (DEXCOM G6 ) DON, 1 each daily       Continuous Blood Gluc Sensor (DEXCOM G6 SENSOR) MISC, 1 each every 10 days       Continuous Blood Gluc Transmit (DEXCOM G6  TRANSMITTER) MISC, 1 each every 3 months       hydrALAZINE (APRESOLINE) 50 MG tablet, Take 2 tablets (100 mg) by mouth 3 times daily       insulin aspart (NOVOLOG FLEXPEN) 100 UNIT/ML pen, INJECT 1 TO 14 UNITS UNDER THE SKIN  WITH MEALS AND 1 TO 11 UNITS AT BEDTIME PER SLIDING SCALES, AND INJECT 1 UNIT UNDER THE SKIN PER 5 GRAMS OF CARBOHYDRATES WITH MEALS AND SNACKS       insulin aspart (NOVOLOG PEN) 100 UNIT/ML pen, Inject 1-14 units per custom scale  For Pre-Meal BG less than 140, no additional insulin needed   to 159 give 1 units.    to 179 give 2 units.    to 199 give 3 units.    to 219 give 4 units.    to 239 give 5 units.    to 259 give 6 units.    to 279 give 7 units.    to 299 give 8 units.    to 319 give 9 units.    to 339 give 10 units.    to 359 give 11 units.    to 379 give 12 units.   to 399 give 13 units.  BG >/=400 give 14 units.       insulin aspart (NOVOLOG PEN) 100 UNIT/ML pen, Inject 1-11 Units Subcutaneous At Bedtime Inject 1-11 units at bedtime per custom scale   For Bedtime BG less than 200, no additional insulin needed   to 219 give 1 units.    to 239 give 2 units.    to 259 give 3 units.    to 279 give 4 units.    to 299 give 5 units.    to 319 give 6 units.    to 339 give 7 units.    to 359 give 8 units    to 379 give 9 units.   to 399 give 10 units.  BG >/=400 give 11 units.       insulin glargine (LANTUS PEN) 100 UNIT/ML pen, Inject 24 Units Subcutaneous At Bedtime       magnesium oxide (MAG-OX) 400 (241.3 Mg) MG tablet, Take 1 tablet (400 mg) by mouth 2 times daily       multivitamin w/minerals (THERA-VIT-M) tablet, Take 1 tablet by mouth daily       mycophenolate (GENERIC EQUIVALENT) 500 MG tablet, Take 1 tablet (500 mg) by mouth 2 times daily       pantoprazole (PROTONIX) 40 MG EC tablet, Take 1 tablet (40 mg) by mouth every morning (before  "breakfast)       rosuvastatin (CRESTOR) 10 MG tablet, TAKE ONE TABLET BY MOUTH ONCE DAILY       tacrolimus (GENERIC EQUIVALENT) 1 MG capsule, Take 4mg (4 caps) in the AM and 3mg (3 caps) in the PM.       loperamide (IMODIUM) 2 MG capsule, Take 1 capsule (2 mg) by mouth 4 times daily as needed for diarrhea    No current facility-administered medications on file prior to visit.     ROS:  See HPI    EXAM:    /81 (BP Location: Right arm, Patient Position: Chair, Cuff Size: Adult Regular)   Pulse 119   Ht 1.626 m (5' 4\")   Wt 83.9 kg (185 lb)   SpO2 98%   BMI 31.76 kg/m      General: appears comfortable, alert  Head: normocephalic, atraumatic  Eyes: anicteric sclera, EOMI  Neck: no adenopathy  Orophyarynx: moist mucosa, no lesions, dentition intact  Heart: regular, tachycardic, S1/S2, no murmur, gallop, rub, estimated JVP 8 cm  Lungs: clear, no rales or wheezing  Abdomen: soft, non-tender, bowel sounds present  Extremities: no clubbing, cyanosis or significant edema  Neurological: normal speech and affect, no gross motor deficits    Labs - reviewed with patient in clinic today:  CBC RESULTS:  Lab Results   Component Value Date    WBC 4.4 09/30/2020    RBC 3.13 (L) 09/30/2020    HGB 8.9 (L) 09/30/2020    HCT 29.1 (L) 09/30/2020    MCV 93 09/30/2020    MCH 28.4 09/30/2020    MCHC 30.6 (L) 09/30/2020    RDW 17.6 (H) 09/30/2020     09/30/2020       CMP RESULTS:  Lab Results   Component Value Date     09/30/2020    POTASSIUM 4.6 09/30/2020    CHLORIDE 108 09/30/2020    CO2 20 09/30/2020    ANIONGAP 8 09/30/2020     (H) 09/30/2020    BUN 40 (H) 09/30/2020    CR 1.92 (H) 09/30/2020    GFRESTIMATED 38 (L) 09/30/2020    GFRESTBLACK 44 (L) 09/30/2020    ARLENE 9.2 09/30/2020    BILITOTAL 0.5 09/30/2020    ALBUMIN 3.1 (L) 09/30/2020    ALKPHOS 69 09/30/2020    ALT 23 09/30/2020    AST 19 09/30/2020        INR RESULTS:  Lab Results   Component Value Date    INR 1.06 08/26/2020       LIPID RESULTS:  Lab " Results   Component Value Date    CHOL 113 09/01/2020    HDL 28 (L) 09/01/2020    LDL 57 09/01/2020    TRIG 143 09/01/2020       IMMUNOSUPPRESSANT LEVELS:  Lab Results   Component Value Date    TACROL 7.8 10/07/2020    DOSTAC 10/06 2100 10/07/2020       No components found for: CK  Lab Results   Component Value Date    MAG 1.5 (L) 09/30/2020     Lab Results   Component Value Date    A1C 7.4 (H) 08/19/2020     Lab Results   Component Value Date    PHOS 4.3 09/30/2020     Lab Results   Component Value Date    NTBNP 404 (H) 06/26/2019     Lab Results   Component Value Date    SAITESTMET [facility] 08/13/2020    SAICELL Class I 08/13/2020    [id number] Cw:15 08/13/2020    [id number] Cw:18 08/13/2020    SAIREPCOM  08/13/2020      Test performed by modified procedure. Serum heat inactivated and tested   by a modified ([place]) protocol including fetal calf serum addition.   High-risk, mfi >3,000. Mod-risk, mfi 500-3,000.       Lab Results   Component Value Date    SAIITESTME [facility] 08/13/2020    SAIICELL Class II 08/13/2020    [id number] None 08/13/2020    [id number] None 08/13/2020    SAIIREPCOM  08/13/2020      Test performed by modified procedure. Serum heat inactivated and tested   by a modified ([place]) protocol including fetal calf serum addition.   High-risk, mfi >3,000. Mod-risk, mfi 500-3,000.       Lab Results   Component Value Date    CSPEC Plasma 09/13/2020       Diagnostic Studies:  TTE 6/15/20  Global and regional left ventricular function is normal with an EF of 60-65%.  Right ventricular function, chamber size, wall motion, and thickness are normal.  The inferior vena cava is normal.  No pericardial effusion is present.    RHC 7/29/20  RA 5  PA 18/12/15  PCWP 12  TD 4.6/2.5    Assessment/Plan:  Mr. Sharp is a 59 year old male who presents y for routine heart transplant follow-up. Patient had a history of severe heart failure with reduced ejection fraction secondary to ischemic cardiomyopathy who had underwent  HeartMate 3 LVAD placement in 2018, complicated by monomorphic VT with ICD shocks, as well as chronic kidney disease paroxysmal atrial fibrillation and elevated prostate serum antigen.  He underwent heart transplant May 18, 2020.  Since his transplantation he has had normal graft function, with largely normal right heart catheterizations (occasionally may have elevated wedge pressures), but with normal outputs and without DSAs.  His biopsies have remained negative for significant rejection.  His postop course was complicated by some nonsustained VT, with some leukocytosis thought to be due to c acnes growing from his former LVAD site.  This was thought to be a possible contaminant but it was decided to still treat him with antibiotics for a full course.  His donor blood also grew  S anginosus and Veillonella, and this tube was also thought to be a contaminant but was also treated with a full course of antibiotics.    Recent hospital admission for respiratory condition initially felt to be fungal pneumonia then treated as a post-obstructive versus aspiration pneumonia, RUL/suprahilar mass +abiotrophia defectiva, narrowing of multiple RUL bronchi. Initially treated as fungal infection given +fungitell but clinically didn't improve and the RUL mass did not change. Ultimately, tissue culture from RUL mass showed abiotrophia defectiva which is an oral microbe. This was felt to be possible contaminant but given it's response to abx (CT improved, clinically improved) so plan was continue to treat and repeat a chest CT with close f/u with transplant ID. His MMF was decreased to 500 mg bid, Immuknow noted to be low.    # Status post OHT on 5/18/20, history of ICM  # Chronic immunosuppression  * Rejection history: none.   * Recent immunosuppression changes: MMF decreased while in hospital  * Last biopsy: 9/30 [facility]  * Intolerance to medications: none     Immunosuppression:  -  mg bid (lower due to infection)  -  tacrolimus goal level 8-10, pending today  - Immuknow pending today, last 147     PPx:  - CAV: aspirin 81mg daily, crestor 10mg daily  - CMV: Valcyte 450mg daily (renally dosed), x6 months (through 11/2020), will STOP early today given leukopenia and recheck CMV PCR next week  - Osteoporosis: calcium/vitamin D   - GI: pantoprazole 40mg daily     Serostatus: CMV D+/R-  EBV D-/R+ Toxo D-/R-     #post-obstructive versus aspiration pneumonia, RUL/suprahilar mass +abiotrophia defectiva  Following with transplant ID closely. Chest CT today pending. Lowered IS as above.     #Leukopenia  WBC 2.1 ANC 1 today, stop valcyte today and recheck CBC and CMV next week.     #Hypertension  At goal on amlodipine 5 mg daily and hydralazine 100 mg tid. Continue to monitor as outpatient.     #Hypomagnesium  400 mg bid supplement.     #DM Type II  Following with endocrinology      #Elevated PSA  Prostate MRI showed signs of BPH. He has been referred to urology.    Dinora Harper, LEX, NP-C  10/28/2020

## 2020-10-30 DIAGNOSIS — R91.8 MASS OF UPPER LOBE OF RIGHT LUNG: Primary | ICD-10-CM

## 2020-10-30 LAB
DONOR IDENTIFICATION: NORMAL
DSA COMMENTS: NORMAL
DSA PRESENT: NO
DSA TEST METHOD: NORMAL
EBV DNA # SPEC NAA+PROBE: NORMAL {COPIES}/ML
EBV DNA SPEC NAA+PROBE-LOG#: NORMAL {LOG_COPIES}/ML
ORGAN: NORMAL
SA1 CELL: NORMAL
SA1 COMMENTS: NORMAL
SA1 HI RISK ABY: NORMAL
SA1 MOD RISK ABY: NORMAL
SA1 TEST METHOD: NORMAL
SA2 CELL: NORMAL
SA2 COMMENTS: NORMAL
SA2 HI RISK ABY UA: NORMAL
SA2 MOD RISK ABY: NORMAL
SA2 TEST METHOD: NORMAL
UNACCEPTABLE ANTIGEN: NORMAL
UNOS CPRA: 0

## 2020-10-31 LAB
CMV DNA SPEC NAA+PROBE-ACNC: NORMAL [IU]/ML
CMV DNA SPEC NAA+PROBE-LOG#: NORMAL {LOG_IU}/ML
SPECIMEN SOURCE: NORMAL

## 2020-11-02 ENCOUNTER — TELEPHONE (OUTPATIENT)
Dept: TRANSPLANT | Facility: CLINIC | Age: 60
End: 2020-11-02

## 2020-11-02 LAB
ALLOMAP SCORE (EXTERNAL): 34
COPATH REPORT: NORMAL
IMMUKNOW IMMUNE CELL FUNCTION: 174 NG/ML
NEGATIVE PREDICTIVE VALUE PERCENT (EXTERNAL): 98.2 %
POSITIVE PREDICTIVE VALUE PERCENT (EXTERNAL): 5 %

## 2020-11-02 NOTE — TELEPHONE ENCOUNTER
Called to review results. BMP, CBC WNL for pt. CMV, EBV negative, no DSA's, heart bx negative. FK therapeutic, no dose change. No questions/concerns at this time.

## 2020-11-04 DIAGNOSIS — Z94.1 HEART REPLACED BY TRANSPLANT (H): Primary | ICD-10-CM

## 2020-11-04 NOTE — PROGRESS NOTES
"Mariusz Sharp is a 60 year old male who is being evaluated via a billable video visit.      The patient has been notified of following:     \"This video visit will be conducted via a call between you and your physician/provider. We have found that certain health care needs can be provided without the need for an in-person physical exam.  This service lets us provide the care you need with a video conversation.  If a prescription is necessary we can send it directly to your pharmacy.  If lab work is needed we can place an order for that and you can then stop by our lab to have the test done at a later time.    Video visits are billed at different rates depending on your insurance coverage.  Please reach out to your insurance provider with any questions.    If during the course of the call the physician/provider feels a video visit is not appropriate, you will not be charged for this service.\"    Patient has given verbal consent for Video visit? Yes  How would you like to obtain your AVS? MyChart  Will anyone else be joining your video visit? No      Video-Visit Details    Type of service:  Video Visit    Distant Location (provider location):  [facility] [place], [place]    Outcome for 11/04/20 11:44 AM: Unable to upload Dexcom G6 at home. Unable to bring Dexcom to clinic - is back home in [place].     Due to the COVID 19 pandemic this visit was converted to a video visit in order to help prevent spread of infection in this patient and the general population.    Time of start: 11:00 am  Time of end: 11:14 am  Total duration of video visit: 14 minutes.    HPI  Mariusz ( Red ) ISABEL Shapr is a 60 year old male with type 2 diabetes mellitus.Video visit today for diabetes care.  Pt was admitted 5/17/2020-5/29/2020 and underwent a heart transplant on 5/18/2020.  Red was also admitted 8/31/2020-9/24/2020 with fever, cough, diarreha and syncope in setting of increase RADER and was dx " "having post-obstructive vs apiration pneumonia. Pt also had RUL/suprahilar mass + abiotrophia defectiva with narrowing of multiple RUL bronchil. He was tx with antibiotics with improvement.  He states he is slowly improving each day.  Pt's hx is significant for type 2 diabetes mellitus dx 3 years ago, HTN, hyperlipidemia, hx of ischemic heart disease s/p STEMI with ALYSSA 2019, LVAD placement, Stage 3 CKD, and obesity.  Apparently heart donor blood cx and respiratory cultures were + for H flu, staph and strep.  Red was also admitted 5/12-5/15/2020 for ANDREW due to nephrolithiasis complicated by hydronephrosis/ANDREW.  For his diabetes, pt is currently taking Lantus 24 units subcutaneous each pm and Novolog 1 unit/5 gms CHO with meals with correction scale  ( 1 unit/20 for BG > 140 ) and bedtime correction scale ( 1 unit/25 for BG > 200).  Pt's A1C was 6.2 % on 10/29/2020 and A1C was 7.4 % on 8/19/2020. He is anemic.  Red wears a DexcomG6 sensor. We were unable to obtain his Dexcom sensor download data today.  He tells me his fasting blood sugar values are in the 150-188 range.  He states his blood sugar during the day are stable.   No frequent hypoglycemia per patient.  On ROS today, fatigued.  He states his breathing is better and decrease cough. Chest is \" sore\" from coughing.  No fevers or rigors.  Pt denies headaches, blurred vision, n/v, abd pain, diarrhea, dysuria, hematuria or numbness in his feet or hands.  Red denies foot ulcers at this time.    Diabetes Care  Retinopathy:none; pt seen by Oph in Dec 2019.  Nephropathy:yes- hx of CKD/ANDREW.  Neuropathy: none.  Foot Exam:no exam today.  Taking aspirin: yes.  Lipids: LDL 81 on 10/29/2020. Pt is taking Crestor.  CAD: yes; s/p heart transplant on 5/18/2020.  Depression: no.  Insulin: Basal and meal time insulin with correction scale ( 1 unit/20 for BG > 140 with meals and > 200 at bedtime).  Testing: DexcomG6 sensor.    ROS  See under HPI.    Allergies  No Known " Allergies    Medications  Current Outpatient Medications   Medication Sig Dispense Refill     ACCU-CHEK [place] PLUS test strip Check BG 3 times daily at meals and at bedtime. 300 strip 0     acetaminophen (TYLENOL) 325 MG tablet Take 2 tablets (650 mg) by mouth every 4 hours as needed for other (multimodal surgical pain management along with NSAIDS and opioid medication as indicated based on pain control and physical function.)       amLODIPine (NORVASC) 5 MG tablet Take 1 tablet (5 mg) by mouth daily 90 tablet 3     BD SILVANA U/F 32G X 4 MM insulin pen needle Use 6 times daily. 600 each 3     calcium carbonate 600 mg-vitamin D 400 units (CALTRATE) 600-400 MG-UNIT per tablet Take 1 tablet by mouth 2 times daily (with meals)       Continuous Blood Gluc  (DEXCOM G6 ) DON 1 each daily 1 Device 1     Continuous Blood Gluc Sensor (DEXCOM G6 SENSOR) MISC 1 each every 10 days 9 each 3     Continuous Blood Gluc Transmit (DEXCOM G6 TRANSMITTER) MISC 1 each every 3 months 1 each 3     hydrALAZINE (APRESOLINE) 50 MG tablet Take 2 tablets (100 mg) by mouth 3 times daily 540 tablet 3     insulin aspart (NOVOLOG FLEXPEN) 100 UNIT/ML pen INJECT 1 TO 14 UNITS UNDER THE SKIN  WITH MEALS AND 1 TO 11 UNITS AT BEDTIME PER SLIDING SCALES, AND INJECT 1 UNIT UNDER THE SKIN PER 5 GRAMS OF CARBOHYDRATES WITH MEALS AND SNACKS 15 mL 4     insulin aspart (NOVOLOG PEN) 100 UNIT/ML pen Inject 1-14 units per custom scale  For Pre-Meal BG less than 140, no additional insulin needed   to 159 give 1 units.    to 179 give 2 units.    to 199 give 3 units.    to 219 give 4 units.    to 239 give 5 units.    to 259 give 6 units.    to 279 give 7 units.    to 299 give 8 units.    to 319 give 9 units.    to 339 give 10 units.    to 359 give 11 units.    to 379 give 12 units.   to 399 give 13 units.  BG >/=400 give 14 units. 3 mL 0     insulin aspart (NOVOLOG PEN)  100 UNIT/ML pen Inject 1-11 Units Subcutaneous At Bedtime Inject 1-11 units at bedtime per custom scale   For Bedtime BG less than 200, no additional insulin needed   to 219 give 1 units.    to 239 give 2 units.    to 259 give 3 units.    to 279 give 4 units.    to 299 give 5 units.    to 319 give 6 units.    to 339 give 7 units.    to 359 give 8 units    to 379 give 9 units.   to 399 give 10 units.  BG >/=400 give 11 units. 3 mL 0     insulin glargine (LANTUS PEN) 100 UNIT/ML pen Inject 24 Units Subcutaneous At Bedtime 15 mL 3     loperamide (IMODIUM) 2 MG capsule Take 1 capsule (2 mg) by mouth 4 times daily as needed for diarrhea 60 capsule 0     magnesium oxide (MAG-OX) 400 (241.3 Mg) MG tablet Take 1 tablet (400 mg) by mouth 2 times daily 90 tablet 3     multivitamin w/minerals (THERA-VIT-M) tablet Take 1 tablet by mouth daily 90 tablet 3     mycophenolate (GENERIC EQUIVALENT) 500 MG tablet Take 1 tablet (500 mg) by mouth 2 times daily 180 tablet 11     pantoprazole (PROTONIX) 40 MG EC tablet Take 1 tablet (40 mg) by mouth every morning (before breakfast) 90 tablet 3     rosuvastatin (CRESTOR) 10 MG tablet TAKE ONE TABLET BY MOUTH ONCE DAILY 90 tablet 3     tacrolimus (GENERIC EQUIVALENT) 1 MG capsule Take 4mg (4 caps) in the AM and 3mg (3 caps) in the PM. 180 capsule 11       Family History  Mother and father with hx of type 2 DM.  Social History   reports that he has never smoked. He has never used smokeless tobacco. He reports that he does not drink alcohol or use drugs.     Past Medical History  Past Medical History:   Diagnosis Date     Acute kidney injury (H)      CKD (chronic kidney disease)      Coronary artery disease      Diabetes mellitus (H)      HTN (hypertension)      Hyperlipidemia      ICD (implantable cardioverter-defibrillator), single chamber- NOT dependent 7/17/2018     Ischemic cardiomyopathy      LV (left ventricular) mural  thrombus      Paroxysmal atrial flutter (H)      RVF (right ventricular failure) (H)      Systolic heart failure (H)      Ventricular tachycardia (H)        Past Surgical History:   Procedure Laterality Date     BRONCHOSCOPY (RIGID OR FLEXIBLE), DIAGNOSTIC N/A 9/15/2020    Procedure: BRONCHOSCOPY, WITH BRONCHOALVEOLAR LAVAGE;  Surgeon: Elder Mejia MD;  Location:  GI     BRONCHOSCOPY (RIGID OR FLEXIBLE), DIAGNOSTIC N/A 9/17/2020    Procedure: BRONCHOSCOPY, WITH BRONCHOALVEOLAR LAVAGE;  Surgeon: Bill Lemus MD;  Location:  OR     BRONCHOSCOPY RIGID OR FLEXIBLE W/TRANSENDOSCOPIC ENDOBRONCHIAL ULTRASOUND GUIDED Right 9/8/2020    Procedure: BRONCHOSCOPY, WITH ENDOBRONCHIAL ULTRASOUND;  Surgeon: Donny Mercedes MD;  Location:  GI     COLONOSCOPY N/A 12/7/2018    Procedure: COLONOSCOPY;  Surgeon: Krish Mercado MD;  Location:  OR     CV HEART BIOPSY N/A 5/24/2020    Procedure: Heart Biopsy;  Surgeon: Kit Bacon MD;  Location:  HEART CARDIAC CATH LAB     CV HEART BIOPSY N/A 6/1/2020    Procedure: CV HEART BIOPSY;  Surgeon: Kit Bacon MD;  Location:  HEART CARDIAC CATH LAB     CV HEART BIOPSY N/A 6/8/2020    Procedure: CV HEART BIOPSY;  Surgeon: Kit Bacon MD;  Location:  HEART CARDIAC CATH LAB     CV HEART BIOPSY N/A 6/15/2020    Procedure: CV HEART BIOPSY;  Surgeon: Kit Bacon MD;  Location:  HEART CARDIAC CATH LAB     CV HEART BIOPSY N/A 6/30/2020    Procedure: CV HEART BIOPSY;  Surgeon: Kit Bacon MD;  Location:  HEART CARDIAC CATH LAB     CV HEART BIOPSY N/A 7/14/2020    Procedure: CV HEART BIOPSY;  Surgeon: Brian Long MD;  Location:  HEART CARDIAC CATH LAB     CV HEART BIOPSY N/A 7/29/2020    Procedure: CV HEART BIOPSY;  Surgeon: Momo Hunt MD;  Location:  HEART CARDIAC CATH LAB     CV HEART BIOPSY N/A 8/13/2020    Procedure: CV HEART BIOPSY;  Surgeon: Krystin  MD Khris;  [facility]     CV HEART BIOPSY N/A 8/26/2020    Procedure: CV HEART BIOPSY;  Surgeon: Momo Hunt MD;  [facility]     CV HEART BIOPSY N/A 9/30/2020    Procedure: CV HEART BIOPSY;  Surgeon: Artemio Ulloa MD;  Location:  HEART CARDIAC CATH LAB     CV HEART BIOPSY N/A 10/29/2020    Procedure: CV HEART BIOPSY;  Surgeon: Kit Bacon MD;  [facility]     CV RIGHT HEART CATH N/A 2/19/2019    Procedure: RHC;  Surgeon: Brian Long MD;  Location:  HEART CARDIAC CATH LAB     CV RIGHT HEART CATH N/A 7/5/2019    Procedure: CV RIGHT HEART CATH;  Surgeon: Khris Mcclelland MD;  Location:  HEART CARDIAC CATH LAB     CV RIGHT HEART CATH N/A 5/24/2020    Procedure: Right Heart Cath;  Surgeon: Kit Bacon MD;  Location:  HEART CARDIAC CATH LAB     CV RIGHT HEART CATH N/A 6/1/2020    Procedure: CV RIGHT HEART CATH;  Surgeon: Kit Bacon MD;  Location:  HEART CARDIAC CATH LAB     CV RIGHT HEART CATH N/A 6/8/2020    Procedure: CV RIGHT HEART CATH;  Surgeon: Kit Bacon MD;  Location:  HEART CARDIAC CATH LAB     CV RIGHT HEART CATH N/A 6/15/2020    Procedure: CV RIGHT HEART CATH;  Surgeon: Kit Bacon MD;  Location:  HEART CARDIAC CATH LAB     CV RIGHT HEART CATH N/A 6/30/2020    Procedure: CV RIGHT HEART CATH;  Surgeon: Kit Bacon MD;  Location:  HEART CARDIAC CATH LAB     CV RIGHT HEART CATH N/A 7/14/2020    Procedure: CV RIGHT HEART CATH;  Surgeon: Brian Long MD;  Location:  HEART CARDIAC CATH LAB     CV RIGHT HEART CATH N/A 7/29/2020    Procedure: CV RIGHT HEART CATH;  Surgeon: Momo Hunt MD;  Location:  HEART CARDIAC CATH LAB     CV RIGHT HEART CATH N/A 8/13/2020    Procedure: CV RIGHT HEART CATH;  Surgeon: Khris Mcclelland MD;  Location: [facility] CARDIAC CATH  LAB     CV RIGHT HEART CATH N/A 8/26/2020    Procedure: CV RIGHT HEART CATH;  Surgeon: Momo Hunt MD;  Location:  HEART CARDIAC CATH LAB     CV RIGHT HEART CATH N/A 9/30/2020    Procedure: Right Heart Cath;  Surgeon: Artemio Ulloa MD;  Location:  HEART CARDIAC CATH LAB     CV RIGHT HEART CATH N/A 10/29/2020    Procedure: CV RIGHT HEART CATH;  Surgeon: Kit Bacon MD;  Location:  HEART CARDIAC CATH LAB     ENDOBRONCHIAL ULTRASOUND FLEXIBLE N/A 9/17/2020    Procedure: BRONCHOSCOPY, flexible, endobronchial ultrasound with transbronchial biopsies, endobronchial biopsies;  Surgeon: Bill Lemus MD;  [facility]      PRQ [id number] CORONARY ANGIOPLASTY ADDL BRANCH      PCI and ALYSSA to ostial LAD on 6/27/18     IMPLANT AUTOMATIC IMPLANTABLE CARDIOVERTER DEFIBRILLATOR       INSERT VENTRICULAR ASSIST DEVICE LEFT (HEARTMATE II) N/A 12/31/2018    Procedure: Median Sternotomy, On Cardiopulmonary Bypass Pump, Insertion of Left Ventricular Assist Device (Heartmate III);  Surgeon: Kamaljit Baker MD;  [facility]     PICC DOUBLE LUMEN PLACEMENT Right 05/22/2020    5FR DL PICC inserted at right basilic vein, length 38cm.     TRANSPLANT HEART RECIPIENT AFTER HEART MATE N/A 5/18/2020    Procedure: REDO MEDIAN STERNOTOMY, LYSIS OF ADHESIONS, ON CARDIOPULMONARY BYPASS PUMP, HEART TRANSPLANT RECIPIENT, REMOVAL OF LEFT VENTRICULAR ASSIST DEVICE, REMOVAL OF AUTOMATED IMPLANTABLE CARDIOVERTER DEFIBRILLATOR;  Surgeon: Elder Willis MD;  [facility]       Physical Exam    No exam today.    RESULTS  Creatinine   Date Value Ref Range Status   10/29/2020 1.89 (H) 0.66 - 1.25 mg/dL Final     GFR Estimate   Date Value Ref Range Status   10/29/2020 38 (L) >60 mL/min/[1.73_m2] Final     Comment:     Non  GFR Calc  Starting 12/18/2018, serum creatinine based estimated GFR (eGFR) will be   calculated using the Chronic Kidney Disease Epidemiology Collaboration   (CKD-EPI)  equation.       Hemoglobin A1C   Date Value Ref Range Status   10/29/2020 6.2 (H) 0 - 5.6 % Final     Comment:     Normal <5.7% Prediabetes 5.7-6.4%  Diabetes 6.5% or higher - adopted from ADA   consensus guidelines.       Potassium   Date Value Ref Range Status   10/29/2020 4.1 3.4 - 5.3 mmol/L Final     ALT   Date Value Ref Range Status   10/29/2020 17 0 - 70 U/L Final     AST   Date Value Ref Range Status   10/29/2020 22 0 - 45 U/L Final     TSH   Date Value Ref Range Status   06/26/2019 3.94 0.40 - 4.00 mU/L Final     T4 Free   Date Value Ref Range Status   07/23/2018 1.05 0.76 - 1.46 ng/dL Final       Cholesterol   Date Value Ref Range Status   10/29/2020 161 <200 mg/dL Final   09/01/2020 113 <200 mg/dL Final     HDL Cholesterol   Date Value Ref Range Status   10/29/2020 51 >39 mg/dL Final   09/01/2020 28 (L) >39 mg/dL Final     LDL Cholesterol Calculated   Date Value Ref Range Status   10/29/2020 81 <100 mg/dL Final     Comment:     Desirable:       <100 mg/dl   09/01/2020 57 <100 mg/dL Final     Comment:     Desirable:       <100 mg/dl     Triglycerides   Date Value Ref Range Status   10/29/2020 144 <150 mg/dL Final   09/01/2020 143 <150 mg/dL Final     A1C   6.2    10/29/2020  A1C   7.8    6/15/2020    ASSESSMENT/PLAN:      1.  TYPE 2 DIABETES MELLITUS: Type 2 diabetes mellitus complicated by hx of CAD and ischemic cardiomyopathy s/p heart transplant on 5/18/2020.   Red's fasting blood sugars are above target by history.  I do not have any Dexcom sensor download data today.  Pt instructed to increase his Lantus 26 units at hs and continue Novolog I/C ratio to 1:5 with current correction scale ( 1 unit/20 for BG > 140 ).  He is to wear his DexcomG6 sensor full time.  Pt was seen by Oph in Dec 2019 without retinopathy.  He denies any sx of neuropathy or foot ulcers at this time.  He is taking ASA daily.  Pt states he had the flu vaccine this Fall.    2.  CKD/ANDREW: Most recent creat was 1.89 with GFR 38 mL/min  on 10/29/2020.    3.  HX OF ISCHEMIC CARDIOMYOPATHY: S/P heart transplant on 5/18/2020 and followed closely by transplant staff here.     4.  FOLLOW UP : with me in 1 month.  He is to call if he has any questions regarding his diabetes care.

## 2020-11-05 ENCOUNTER — VIRTUAL VISIT (OUTPATIENT)
Dept: ENDOCRINOLOGY | Facility: CLINIC | Age: 60
End: 2020-11-05
Payer: COMMERCIAL

## 2020-11-05 DIAGNOSIS — Z79.4 TYPE 2 DIABETES MELLITUS WITH HYPERGLYCEMIA, WITH LONG-TERM CURRENT USE OF INSULIN (H): Primary | ICD-10-CM

## 2020-11-05 DIAGNOSIS — E11.65 TYPE 2 DIABETES MELLITUS WITH HYPERGLYCEMIA, WITH LONG-TERM CURRENT USE OF INSULIN (H): Primary | ICD-10-CM

## 2020-11-05 LAB
MYCOBACTERIUM SPEC CULT: NORMAL
SPECIMEN SOURCE: NORMAL
SPECIMEN SOURCE: NORMAL

## 2020-11-05 PROCEDURE — 99214 OFFICE O/P EST MOD 30 MIN: CPT | Mod: 95 | Performed by: PHYSICIAN ASSISTANT

## 2020-11-05 NOTE — LETTER
"11/5/2020       RE: Mariusz Sharp  [address] [facility] [address]     Dear Colleague,    Thank you for referring your patient, Mariusz Sharp, to the [facility] ENDOCR[facility]OLOG[facility] [place] at [facility]. Please see a copy of my visit note below.    Mariusz Sharp is a 60 year old male who is being evaluated via a billable video visit.      The patient has been notified of following:     \"This video visit will be conducted via a call between you and your physician/provider. We have found that certain health care needs can be provided without the need for an in-person physical exam.  This service lets us provide the care you need with a video conversation.  If a prescription is necessary we can send it directly to your pharmacy.  If lab work is needed we can place an order for that and you can then stop by our lab to have the test done at a later time.    Video visits are billed at different rates depending on your insurance coverage.  Please reach out to your insurance provider with any questions.    If during the course of the call the physician/provider feels a video visit is not appropriate, you will not be charged for this service.\"    Patient has given verbal consent for Video visit? Yes  How would you like to obtain your AVS? MyChart  Will anyone else be joining your video visit? No      Video-Visit Details    Type of service:  Video Visit    Distant Location (provider location):  [facility]CRINOLOG[facility] [place]     Paola Beaulieu MA    Outcome for 11/04/20 11:44 AM: Unable to upload Dexcom G6 at home. Unable to bring Dexcom to clinic - is back home in [place].     Due to the COVID 19 pandemic this visit was converted to a video visit in order to help prevent spread of infection in this patient and the general population.    Time of start: 11:00 am  Time of end: 11:14 am  Total duration of video visit: 14 minutes.    CHANELL Moon" "Juan Pablo Sharp is a 60 year old male with type 2 diabetes mellitus.Video visit today for diabetes care.  Pt was admitted 5/17/2020-5/29/2020 and underwent a heart transplant on 5/18/2020.  Red was also admitted 8/31/2020-9/24/2020 with fever, cough, diarreha and syncope in setting of increase RADER and was dx having post-obstructive vs apiration pneumonia. Pt also had RUL/suprahilar mass + abiotrophia defectiva with narrowing of multiple RUL bronchil. He was tx with antibiotics with improvement.  He states he is slowly improving each day.  Pt's hx is significant for type 2 diabetes mellitus dx 3 years ago, HTN, hyperlipidemia, hx of ischemic heart disease s/p STEMI with ALYSSA 2019, LVAD placement, Stage 3 CKD, and obesity.  Apparently heart donor blood cx and respiratory cultures were + for H flu, staph and strep.  Red was also admitted 5/12-5/15/2020 for ANDREW due to nephrolithiasis complicated by hydronephrosis/ANDREW.  For his diabetes, pt is currently taking Lantus 24 units subcutaneous each pm and Novolog 1 unit/5 gms CHO with meals with correction scale  ( 1 unit/20 for BG > 140 ) and bedtime correction scale ( 1 unit/25 for BG > 200).  Pt's A1C was 6.2 % on 10/29/2020 and A1C was 7.4 % on 8/19/2020. He is anemic.  Red wears a DexcomG6 sensor. We were unable to obtain his Dexcom sensor download data today.  He tells me his fasting blood sugar values are in the 150-188 range.  He states his blood sugar during the day are stable.   No frequent hypoglycemia per patient.  On ROS today, fatigued.  He states his breathing is better and decrease cough. Chest is \" sore\" from coughing.  No fevers or rigors.  Pt denies headaches, blurred vision, n/v, abd pain, diarrhea, dysuria, hematuria or numbness in his feet or hands.  Red denies foot ulcers at this time.    Diabetes Care  Retinopathy:none; pt seen by Oph in Dec 2019.  Nephropathy:yes- hx of CKD/ANDREW.  Neuropathy: none.  Foot Exam:no exam today.  Taking aspirin: yes.  Lipids: " LDL 81 on 10/29/2020. Pt is taking Crestor.  CAD: yes; s/p heart transplant on 5/18/2020.  Depression: no.  Insulin: Basal and meal time insulin with correction scale ( 1 unit/20 for BG > 140 with meals and > 200 at bedtime).  Testing: DexcomG6 sensor.    ROS  See under HPI.    Allergies  No Known Allergies    Medications  Current Outpatient Medications   Medication Sig Dispense Refill     ACCU-CHEK [place]LOTTE PLUS test strip Check BG 3 times daily at meals and at bedtime. 300 strip 0     acetaminophen (TYLENOL) 325 MG tablet Take 2 tablets (650 mg) by mouth every 4 hours as needed for other (multimodal surgical pain management along with NSAIDS and opioid medication as indicated based on pain control and physical function.)       amLODIPine (NORVASC) 5 MG tablet Take 1 tablet (5 mg) by mouth daily 90 tablet 3     BD SILVANA U/F 32G X 4 MM insulin pen needle Use 6 times daily. 600 each 3     calcium carbonate 600 mg-vitamin D 400 units (CALTRATE) 600-400 MG-UNIT per tablet Take 1 tablet by mouth 2 times daily (with meals)       Continuous Blood Gluc  (DEXCOM G6 ) DON 1 each daily 1 Device 1     Continuous Blood Gluc Sensor (DEXCOM G6 SENSOR) MISC 1 each every 10 days 9 each 3     Continuous Blood Gluc Transmit (DEXCOM G6 TRANSMITTER) MISC 1 each every 3 months 1 each 3     hydrALAZINE (APRESOLINE) 50 MG tablet Take 2 tablets (100 mg) by mouth 3 times daily 540 tablet 3     insulin aspart (NOVOLOG FLEXPEN) 100 UNIT/ML pen INJECT 1 TO 14 UNITS UNDER THE SKIN  WITH MEALS AND 1 TO 11 UNITS AT BEDTIME PER SLIDING SCALES, AND INJECT 1 UNIT UNDER THE SKIN PER 5 GRAMS OF CARBOHYDRATES WITH MEALS AND SNACKS 15 mL 4     insulin aspart (NOVOLOG PEN) 100 UNIT/ML pen Inject 1-14 units per custom scale  For Pre-Meal BG less than 140, no additional insulin needed   to 159 give 1 units.    to 179 give 2 units.    to 199 give 3 units.    to 219 give 4 units.    to 239 give 5 units.    to  259 give 6 units.    to 279 give 7 units.    to 299 give 8 units.    to 319 give 9 units.    to 339 give 10 units.    to 359 give 11 units.    to 379 give 12 units.   to 399 give 13 units.  BG >/=400 give 14 units. 3 mL 0     insulin aspart (NOVOLOG PEN) 100 UNIT/ML pen Inject 1-11 Units Subcutaneous At Bedtime Inject 1-11 units at bedtime per custom scale   For Bedtime BG less than 200, no additional insulin needed   to 219 give 1 units.    to 239 give 2 units.    to 259 give 3 units.    to 279 give 4 units.    to 299 give 5 units.    to 319 give 6 units.    to 339 give 7 units.    to 359 give 8 units    to 379 give 9 units.   to 399 give 10 units.  BG >/=400 give 11 units. 3 mL 0     insulin glargine (LANTUS PEN) 100 UNIT/ML pen Inject 24 Units Subcutaneous At Bedtime 15 mL 3     loperamide (IMODIUM) 2 MG capsule Take 1 capsule (2 mg) by mouth 4 times daily as needed for diarrhea 60 capsule 0     magnesium oxide (MAG-OX) 400 (241.3 Mg) MG tablet Take 1 tablet (400 mg) by mouth 2 times daily 90 tablet 3     multivitamin w/minerals (THERA-VIT-M) tablet Take 1 tablet by mouth daily 90 tablet 3     mycophenolate (GENERIC EQUIVALENT) 500 MG tablet Take 1 tablet (500 mg) by mouth 2 times daily 180 tablet 11     pantoprazole (PROTONIX) 40 MG EC tablet Take 1 tablet (40 mg) by mouth every morning (before breakfast) 90 tablet 3     rosuvastatin (CRESTOR) 10 MG tablet TAKE ONE TABLET BY MOUTH ONCE DAILY 90 tablet 3     tacrolimus (GENERIC EQUIVALENT) 1 MG capsule Take 4mg (4 caps) in the AM and 3mg (3 caps) in the PM. 180 capsule 11       Family History  Mother and father with hx of type 2 DM.  Social History   reports that he has never smoked. He has never used smokeless tobacco. He reports that he does not drink alcohol or use drugs.     Past Medical History  Past Medical History:   Diagnosis Date     Acute kidney injury  (H)      CKD (chronic kidney disease)      Coronary artery disease      Diabetes mellitus (H)      HTN (hypertension)      Hyperlipidemia      ICD (implantable cardioverter-defibrillator), single chamber- NOT dependent 7/17/2018     Ischemic cardiomyopathy      LV (left ventricular) mural thrombus      Paroxysmal atrial flutter (H)      RVF (right ventricular failure) (H)      Systolic heart failure (H)      Ventricular tachycardia (H)        Past Surgical History:   Procedure Laterality Date     BRONCHOSCOPY (RIGID OR FLEXIBLE), DIAGNOSTIC N/A 9/15/2020    Procedure: BRONCHOSCOPY, WITH BRONCHOALVEOLAR LAVAGE;  Surgeon: Elder Mejia MD;  Location:  GI     BRONCHOSCOPY (RIGID OR FLEXIBLE), DIAGNOSTIC N/A 9/17/2020    Procedure: BRONCHOSCOPY, WITH BRONCHOALVEOLAR LAVAGE;  Surgeon: Bill Lemus MD;  Location: [facility] OR     BRONCHOSCOPY RIGID OR FLEXIBLE W/TRANSENDOSCOPIC ENDOBRONCHIAL ULTRASOUND GUIDED Right 9/8/2020    Procedure: BRONCHOSCOPY, WITH ENDOBRONCHIAL ULTRASOUND;  Surgeon: Donny Mercedes MD;  Location:  GI     COLONOSCOPY N/A 12/7/2018    Procedure: COLONOSCOPY;  Surgeon: Krish Mercado MD;  Location: [facility] OR     CV HEART BIOPSY N/A 5/24/2020    Procedure: Heart Biopsy;  Surgeon: Kit Bacon MD;  Location:  HEART CARDIAC CATH LAB     CV HEART BIOPSY N/A 6/1/2020    Procedure: CV HEART BIOPSY;  Surgeon: Kit Bacon MD;  Location:  HEART CARDIAC CATH LAB     CV HEART BIOPSY N/A 6/8/2020    Procedure: CV HEART BIOPSY;  Surgeon: Kit Bacon MD;  Location:  HEART CARDIAC CATH LAB     CV HEART BIOPSY N/A 6/15/2020    Procedure: CV HEART BIOPSY;  Surgeon: Kit Bacon MD;  Location:  HEART CARDIAC CATH LAB     CV HEART BIOPSY N/A 6/30/2020    Procedure: CV HEART BIOPSY;  Surgeon: Kit Bacon MD;  Location:  HEART CARDIAC CATH LAB     CV HEART BIOPSY N/A 7/14/2020    Procedure: CV HEART BIOPSY;   Surgeon: Brian Long MD;  [facility]     CV HEART BIOPSY N/A 7/29/2020    Procedure: CV HEART BIOPSY;  Surgeon: Momo Hunt MD;  Location: [facility] HEART CARD[facility]AC CATH LAB     CV HEART BIOPSY N/A 8/13/2020    Procedure: CV HEART BIOPSY;  Surgeon: Khris Mcclelland MD;  [facility]     CV HEART BIOPSY N/A 8/26/2020    Procedure: CV HEART BIOPSY;  Surgeon: Momo Hunt MD;  Location: [facility]ARD[facility]AC [facility]     CV HEART BIOPSY N/A 9/30/2020    Procedure: CV HEART BIOPSY;  Surgeon: Artemio Ulloa MD;  Location:  HEART CARDIAC CATH LAB     CV HEART BIOPSY N/A 10/29/2020    Procedure: CV HEART BIOPSY;  Surgeon: Kit Bacon MD;  Location:  HEART CARDIAC CATH LAB     CV RIGHT HEART CATH N/A 2/19/2019    Procedure: RHC;  Surgeon: Brian Long MD;  [facility]     CV RIGHT HEART CATH N/A 7/5/2019    Procedure: CV RIGHT HEART CATH;  Surgeon: Khris Mcclelland MD;  Location:  HEART CARDIAC CATH LAB     CV RIGHT HEART CATH N/A 5/24/2020    Procedure: Right Heart Cath;  Surgeon: Kit Bacon MD;  Location:  HEART CARDIAC CATH LAB     CV RIGHT HEART CATH N/A 6/1/2020    Procedure: CV RIGHT HEART CATH;  Surgeon: Kit Bacon MD;  Location:  HEART CARDIAC CATH LAB     CV RIGHT HEART CATH N/A 6/8/2020    Procedure: CV RIGHT HEART CATH;  Surgeon: Kit Bacon MD;  Location:  HEART CARDIAC CATH LAB     CV RIGHT HEART CATH N/A 6/15/2020    Procedure: CV RIGHT HEART CATH;  Surgeon: Kit Bacon MD;  Location:  HEART CARDIAC CATH LAB     CV RIGHT HEART CATH N/A 6/30/2020    Procedure: CV RIGHT HEART CATH;  Surgeon: Kit Bacon MD;  Location: [facility] CARDIAC CATH LAB     CV RIGHT HEART CATH N/A 7/14/2020    Procedure: CV RIGHT HEART CATH;  Surgeon: Brian Long MD;  Location: [facility] CARDIAC  CATH LAB     CV RIGHT HEART CATH N/A 7/29/2020    Procedure: CV RIGHT HEART CATH;  Surgeon: Momo Hunt MD;  Location:  HEART CARDIAC CATH LAB     CV RIGHT HEART CATH N/A 8/13/2020    Procedure: CV RIGHT HEART CATH;  Surgeon: Khris Mcclelland MD;  Location:  HEART CARDIAC CATH LAB     CV RIGHT HEART CATH N/A 8/26/2020    Procedure: CV RIGHT HEART CATH;  Surgeon: Momo Hunt MD;  Location:  HEART CARDIAC CATH LAB     CV RIGHT HEART CATH N/A 9/30/2020    Procedure: Right Heart Cath;  Surgeon: Artemio Ulloa MD;  Location:  HEART CARDIAC CATH LAB     CV RIGHT HEART CATH N/A 10/29/2020    Procedure: CV RIGHT HEART CATH;  Surgeon: Kit Bacon MD;  Location:  HEART CARDIAC CATH LAB     ENDOBRONCHIAL ULTRASOUND FLEXIBLE N/A 9/17/2020    Procedure: BRONCHOSCOPY, flexible, endobronchial ultrasound with transbronchial biopsies, endobronchial biopsies;  Surgeon: Bill Lemus MD;  Location:  OR      PRQ TRLUML CORONARY ANGIOPLASTY ADDL BRANCH      PCI and ALYSSA to ostial LAD on 6/27/18     IMPLANT AUTOMATIC IMPLANTABLE CARDIOVERTER DEFIBRILLATOR       INSERT VENTRICULAR ASSIST DEVICE LEFT (HEARTMATE II) N/A 12/31/2018    Procedure: Median Sternotomy, On Cardiopulmonary Bypass Pump, Insertion of Left Ventricular Assist Device (Heartmate III);  Surgeon: Kamaljit Baker MD;  Location: [facility] OR     PICC DOUBLE LUMEN PLACEMENT Right 05/22/2020    5FR DL PICC inserted at right basilic vein, length 38cm.     TRANSPLANT HEART RECIPIENT AFTER HEART MATE N/A 5/18/2020    Procedure: REDO MEDIAN STERNOTOMY, LYSIS OF ADHESIONS, ON CARDIOPULMONARY BYPASS PUMP, HEART TRANSPLANT RECIPIENT, REMOVAL OF LEFT VENTRICULAR ASSIST DEVICE, REMOVAL OF AUTOMATED IMPLANTABLE CARDIOVERTER DEFIBRILLATOR;  Surgeon: Elder Willis MD;  Location: [facility] OR       Physical Exam    No exam today.    RESULTS  Creatinine   Date Value Ref Range Status   10/29/2020 1.89 (H) 0.66 - 1.25 mg/dL Final      GFR Estimate   Date Value Ref Range Status   10/29/2020 38 (L) >60 mL/min/[1.73_m2] Final     Comment:     Non  GFR Calc  Starting 12/18/2018, serum creatinine based estimated GFR (eGFR) will be   calculated using the Chronic Kidney Disease Epidemiology Collaboration   (CKD-EPI) equation.       Hemoglobin A1C   Date Value Ref Range Status   10/29/2020 6.2 (H) 0 - 5.6 % Final     Comment:     Normal <5.7% Prediabetes 5.7-6.4%  Diabetes 6.5% or higher - adopted from ADA   consensus guidelines.       Potassium   Date Value Ref Range Status   10/29/2020 4.1 3.4 - 5.3 mmol/L Final     ALT   Date Value Ref Range Status   10/29/2020 17 0 - 70 U/L Final     AST   Date Value Ref Range Status   10/29/2020 22 0 - 45 U/L Final     TSH   Date Value Ref Range Status   06/26/2019 3.94 0.40 - 4.00 mU/L Final     T4 Free   Date Value Ref Range Status   07/23/2018 1.05 0.76 - 1.46 ng/dL Final       Cholesterol   Date Value Ref Range Status   10/29/2020 161 <200 mg/dL Final   09/01/2020 113 <200 mg/dL Final     HDL Cholesterol   Date Value Ref Range Status   10/29/2020 51 >39 mg/dL Final   09/01/2020 28 (L) >39 mg/dL Final     LDL Cholesterol Calculated   Date Value Ref Range Status   10/29/2020 81 <100 mg/dL Final     Comment:     Desirable:       <100 mg/dl   09/01/2020 57 <100 mg/dL Final     Comment:     Desirable:       <100 mg/dl     Triglycerides   Date Value Ref Range Status   10/29/2020 144 <150 mg/dL Final   09/01/2020 143 <150 mg/dL Final     A1C   6.2    10/29/2020  A1C   7.8    6/15/2020    ASSESSMENT/PLAN:      1.  TYPE 2 DIABETES MELLITUS: Type 2 diabetes mellitus complicated by hx of CAD and ischemic cardiomyopathy s/p heart transplant on 5/18/2020.   Red's fasting blood sugars are above target by history.  I do not have any Dexcom sensor download data today.  Pt instructed to increase his Lantus 26 units at hs and continue Novolog I/C ratio to 1:5 with current correction scale ( 1 unit/20 for BG >  140 ).  He is to wear his DexcomG6 sensor full time.  Pt was seen by Oph in Dec 2019 without retinopathy.  He denies any sx of neuropathy or foot ulcers at this time.  He is taking ASA daily.  Pt states he had the flu vaccine this Fall.    2.  CKD/ANDREW: Most recent creat was 1.89 with GFR 38 mL/min on 10/29/2020.    3.  HX OF ISCHEMIC CARDIOMYOPATHY: S/P heart transplant on 5/18/2020 and followed closely by transplant staff here.     4.  FOLLOW UP : with me in 1 month.  He is to call if he has any questions regarding his diabetes care.            Jeannette Schafer PA-C

## 2020-11-10 DIAGNOSIS — Z94.1 STATUS POST HEART TRANSPLANTATION (H): ICD-10-CM

## 2020-11-10 RX ORDER — MYCOPHENOLATE MOFETIL 500 MG/1
500 TABLET ORAL 2 TIMES DAILY
Qty: 180 TABLET | Refills: 3 | Status: SHIPPED | OUTPATIENT
Start: 2020-11-10 | End: 2021-01-05 | Stop reason: DRUGHIGH

## 2020-11-11 LAB
MYCOBACTERIUM SPEC CULT: NORMAL
MYCOBACTERIUM SPEC CULT: NORMAL
SPECIMEN SOURCE: NORMAL

## 2020-11-14 LAB
MYCOBACTERIUM SPEC CULT: NORMAL
MYCOBACTERIUM SPEC CULT: NORMAL
SPECIMEN SOURCE: NORMAL

## 2020-11-20 ENCOUNTER — TELEPHONE (OUTPATIENT)
Dept: TRANSPLANT | Facility: CLINIC | Age: 60
End: 2020-11-20

## 2020-11-20 NOTE — TELEPHONE ENCOUNTER
Patient Call: Voicemail  Date/Time: 11/20/20 239pm  Reason for call: Dioni from lab called, patient came in today for labs but they need orders faxed for November labs.  Please fax to [phone] or call [phone] with questions

## 2020-11-23 DIAGNOSIS — Z94.1 STATUS POST HEART TRANSPLANTATION (H): ICD-10-CM

## 2020-11-27 RX ORDER — INSULIN ASPART 100 [IU]/ML
INJECTION, SOLUTION INTRAVENOUS; SUBCUTANEOUS
Qty: 15 ML | Refills: 3 | Status: SHIPPED | OUTPATIENT
Start: 2020-11-27 | End: 2021-08-10

## 2020-12-01 ENCOUNTER — VIRTUAL VISIT (OUTPATIENT)
Dept: INFECTIOUS DISEASES | Facility: CLINIC | Age: 60
End: 2020-12-01
Attending: INTERNAL MEDICINE
Payer: MEDICARE

## 2020-12-01 VITALS — BODY MASS INDEX: 31.76 KG/M2 | WEIGHT: 185 LBS

## 2020-12-01 DIAGNOSIS — J69.0 ASPIRATION PNEUMONIA OF RIGHT MIDDLE LOBE, UNSPECIFIED ASPIRATION PNEUMONIA TYPE (H): ICD-10-CM

## 2020-12-01 DIAGNOSIS — J18.9 POSTOBSTRUCTIVE PNEUMONIA: ICD-10-CM

## 2020-12-01 DIAGNOSIS — Z23 NEED FOR VACCINATION: ICD-10-CM

## 2020-12-01 DIAGNOSIS — Z94.1 HEART REPLACED BY TRANSPLANT (H): ICD-10-CM

## 2020-12-01 DIAGNOSIS — R91.8 MASS OF UPPER LOBE OF RIGHT LUNG: Primary | ICD-10-CM

## 2020-12-01 PROCEDURE — 99212 OFFICE O/P EST SF 10 MIN: CPT | Mod: 95 | Performed by: INTERNAL MEDICINE

## 2020-12-01 ASSESSMENT — PAIN SCALES - GENERAL: PAINLEVEL: NO PAIN (0)

## 2020-12-01 NOTE — LETTER
"12/1/2020       RE: Maruisz Sharp  [address] [facility] [address]     Dear Colleague,    Thank you for referring your patient, Mariusz Sharp, to the [facility] at [facility]. Please see a copy of my visit note below.    Mariusz Sharp is a 60 year old male who is being evaluated via a billable video visit.      The patient has been notified of following:     \"This video visit will be conducted via a call between you and your physician/provider. We have found that certain health care needs can be provided without the need for an in-person physical exam.  This service lets us provide the care you need with a video conversation.  If a prescription is necessary we can send it directly to your pharmacy.  If lab work is needed we can place an order for that and you can then stop by our lab to have the test done at a later time.    Video visits are billed at different rates depending on your insurance coverage.  Please reach out to your insurance provider with any questions.    If during the course of the call the physician/provider feels a video visit is not appropriate, you will not be charged for this service.\"    Patient has given verbal consent for Video visit? Yes  How would you like to obtain your AVS? MyChart  If you are dropped from the video visit, the video invite should be resent to: Text to cell phone: [phone]  Will anyone else be joining your video visit? No        Video-Visit Details  We had to convert to phone visit as the patient was not able to connect to the internet.   The phone visit lasted 15 minutes.       [facility]    Transplant Infectious Diseases Outpatient Progress note      Patient:  Mariusz Sharp, Date of birth 1960, Medical record number [id number]  Date of Visit:  12/01/2020         Recommendations:   1. Continue off of ABx.   2. Repeat CT chest without contrast " when he's in town on 12/23/2020.   3. Needs the shingrix vaccine series and the 23-valent pneumovax.     Discontinue cipro and clindamycin as planned on 10/11/2020.   2. CT chest next time you're in town 10/29/2020.   - further recommendations depends on symptoms and CT findings.     RTC: sometime in 1/2021.          Summary of Presentation:   Transplants:  5/18/2020 (Heart), Postoperative day:  197     This patient is a 59 year old male with ICMP s/p OHT basiliximab for induction, TAC/MMF for maintenance IS.   Recently discharged after hospitalization for cough, fever, shortness of breath and post tussive LOC. Treated for RUL pneumonia.         Active Problems and Infectious Diseases Issues:   1. Likely aspiration pneumonia vs postobstructive pneumonia  The patient responded to IV zosyn while he was in the hospital with resolution of fever and improvement of chest CT. His clinical and radiological findings did not improve with posaconazole, which was discontinued.   The patient finished a total of 4 weeks of ABx on 10/11/2020.   The cough persists but the frequency is improving. No fever or chills. CT is slowly improving but still with right hilar infiltration.  Appetite improving and gaining weight.       The BALs on 9/8/2020, 9/15/2020 and 9/17/2020 were all unremarkable except for the growth of Abiotrophia defectiva (oral simona).   The TBBx were all non-diagnostic/unremarkable.     Will repeat CT the end of 12/2020.           Old Problems and Infectious Diseases Issues:   1. 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   2. C acnes in the extracted VAD; treated with vancomycin then doxycycline.   3. Was diagnosed with possible chronic prostatitis and was treated with ABx for longer than one month between 8/2020 and 10/2020.       Other Infectious Disease issues include:  - QTc 475 as of 9/16/2020.   - PCP prophylaxis: bactrim caused hyperkalemia, then  "pentamidine. Now off PJP ppx.   - Serostatus: CMV D+/R-, EBV D-/R+, HSV1?/2?, VZV +, Toxo D-/R-     On VGCV ppx per protocol.   - Gamma globulin status: 510 as of 9/13/2020       Attestation:  I interviewed the patient and obtained history from the patient, and by reviewing the patient's chart including outside records, microbiological data, and radiological data. All data are summarized in this notes.  Tavares Rodriguez MD   Pager: [phone]  12/01/2020         Interim History:   Remains afebrile. Still with cough but the frequency of the cough is improving. No hemoptysis. The cough is productive of \"merky\" phlegm. No more post-tussive syncope.   The patient's appetite has improved and he's gaining weight.   No other complaints.         HPI:       Transplants:  5/18/2020 (Heart); Postoperative day:  197  In summary:  Presented to the hospital with 24 hr of dyspnea, fever, and cough with post tussive LOC.   He was found to have RUL consolidation vs obstructive LN vs mass.   Non-invasive workup with urine and blood Histo and Blasto Ag were negative, A galactomannan negative, BD glucan low positive at 86 and 90.  He was treated with posaconazole empirically from 9/4/2020 till 9/13/202 when he spiked fever and CT showed worsening of the mass/consolidation. Zosyn was initiated on 9/13/2020 for possible post obstructive pneumonia with resolution of fever and posaconazole was discontinued due to lack of effectiveness.   Bronchoscopy with TBBx on 9/8/2020 and 9/15/2020 were both not diagnostic. FNA on 9/17/202 and BAL the same day were also not significant.   The biopsy from 9/8/2020 was also sent for NGS by PCR of 28S DNA and was negative for fungal infection.   EBV was not detected.     Given the significant improvement with zosyn, it was believed that the RUL infiltrate was due to aspiration or postobstructive pneumonia. He was discharged on cipro and clindamycin PO till 10/11/2020 for total of 4 weeks.            Past " Medical History:     Past Medical History:   Diagnosis Date     Acute kidney injury (H)      CKD (chronic kidney disease)      Coronary artery disease      Diabetes mellitus (H)      HTN (hypertension)      Hyperlipidemia      ICD (implantable cardioverter-defibrillator), single chamber- NOT dependent 7/17/2018     Ischemic cardiomyopathy      LV (left ventricular) mural thrombus      Paroxysmal atrial flutter (H)      RVF (right ventricular failure) (H)      Systolic heart failure (H)      Ventricular tachycardia (H)              Past Surgical History:     Past Surgical History:   Procedure Laterality Date     BRONCHOSCOPY (RIGID OR FLEXIBLE), DIAGNOSTIC N/A 9/15/2020    Procedure: BRONCHOSCOPY, WITH BRONCHOALVEOLAR LAVAGE;  Surgeon: Elder Mejia MD;  Location:  GI     BRONCHOSCOPY (RIGID OR FLEXIBLE), DIAGNOSTIC N/A 9/17/2020    Procedure: BRONCHOSCOPY, WITH BRONCHOALVEOLAR LAVAGE;  Surgeon: Bill Lemus MD;  Location: [facility] OR     BRONCHOSCOPY RIGID OR FLEXIBLE W/TRANSENDOSCOPIC ENDOBRONCHIAL ULTRASOUND GUIDED Right 9/8/2020    Procedure: BRONCHOSCOPY, WITH ENDOBRONCHIAL ULTRASOUND;  Surgeon: Donny Mercedes MD;  Location:  GI     COLONOSCOPY N/A 12/7/2018    Procedure: COLONOSCOPY;  Surgeon: Krish Mercado MD;  [facility]: [facility] OR     CV HEART BIOPSY N/A 5/24/2020    Procedure: Heart Biopsy;  Surgeon: Kit Bacon MD;  Location:  HEART CARDIAC CATH LAB     CV HEART BIOPSY N/A 6/1/2020    Procedure: CV HEART BIOPSY;  Surgeon: Kit Bacon MD;  Location:  HEART CARDIAC CATH LAB     CV HEART BIOPSY N/A 6/8/2020    Procedure: CV HEART BIOPSY;  Surgeon: Kit Bacon MD;  Location:  HEART CARDIAC CATH LAB     CV HEART BIOPSY N/A 6/15/2020    Procedure: CV HEART BIOPSY;  Surgeon: Kit Bacon MD;  Location:  HEART CARDIAC CATH LAB     CV HEART BIOPSY N/A 6/30/2020    Procedure: CV HEART BIOPSY;  Surgeon: Kit Bacon  MD Vero;  [facility]     CV HEART BIOPSY N/A 7/14/2020    Procedure: CV HEART BIOPSY;  Surgeon: Brian Long MD;  [facility]AC [facility] LAB     CV HEART BIOPSY N/A 7/29/2020    Procedure: CV HEART BIOPSY;  Surgeon: Momo Hunt MD;  [facility]AC [facility]     CV HEART BIOPSY N/A 8/13/2020    Procedure: CV HEART BIOPSY;  Surgeon: Khris Mcclelland MD;  [facility] HE[facility]ARD[facility]AC [facility] LAB     CV HEART BIOPSY N/A 8/26/2020    Procedure: CV HEART BIOPSY;  Surgeon: Momo Hunt MD;  Location: [facility] HEART CARDIAC CATH LAB     CV HEART BIOPSY N/A 9/30/2020    Procedure: CV HEART BIOPSY;  Surgeon: Artemio Ulloa MD;  [facility]: [facility] HE[facility]ARDIAC [facility]ATH LAB     CV HEART BIOPSY N/A 10/29/2020    Procedure: CV HEART BIOPSY;  Surgeon: Kit Bacon MD;  Location: [facility] HEART CARDIAC CATH LAB     CV RIGHT HEART CATH N/A 2/19/2019    Procedure: RHC;  Surgeon: Brian Long MD;  Location: [facility] HEART CARDIAC CATH LAB     CV RIGHT HEART CATH N/A 7/5/2019    Procedure: CV RIGHT HEART CATH;  Surgeon: Khris Mcclelland MD;  Location: [facility] HEART CARDIAC CATH LAB     CV RIGHT HEART CATH N/A 5/24/2020    Procedure: Right Heart Cath;  Surgeon: Kit Bacon MD;  [facility]: [facility]ARDIAC C[facility] LAB     CV RIGHT HEART CATH N/A 6/1/2020    Procedure: CV RIGHT HEART CATH;  Surgeon: Kit Bacon MD;  Location: [facility] HEART CARDIAC CATH LAB     CV RIGHT HEART CATH N/A 6/8/2020    Procedure: CV RIGHT HEART CATH;  Surgeon: Kit Bacon MD;  Location: [facility] HEART CARDIAC CATH LAB     CV RIGHT HEART CATH N/A 6/15/2020    Procedure: CV RIGHT HEART CATH;  Surgeon: Kit Bacon MD;  [facility]: [facility] HE[facility]ARDIAC CATH LAB     CV RIGHT HEART CATH N/A 6/30/2020    Procedure: CV RIGHT HEART CATH;  Surgeon: Kit Bacon MD;  Location:  HEART CARDIAC CATH LAB     CV RIGHT  HEART CATH N/A 7/14/2020    Procedure: CV RIGHT HEART CATH;  Surgeon: Brian Long MD;  Location:  HEART CARDIAC CATH LAB     CV RIGHT HEART CATH N/A 7/29/2020    Procedure: CV RIGHT HEART CATH;  Surgeon: Momo Hunt MD;  Location:  HEART CARDIAC CATH LAB     CV RIGHT HEART CATH N/A 8/13/2020    Procedure: CV RIGHT HEART CATH;  Surgeon: Khris Mcclelland MD;  Location:  HEART CARDIAC CATH LAB     CV RIGHT HEART CATH N/A 8/26/2020    Procedure: CV RIGHT HEART CATH;  Surgeon: Momo Hunt MD;  Location:  HEART CARDIAC CATH LAB     CV RIGHT HEART CATH N/A 9/30/2020    Procedure: Right Heart Cath;  Surgeon: Artemio Ulloa MD;  Location:  HEART CARDIAC CATH LAB     CV RIGHT HEART CATH N/A 10/29/2020    Procedure: CV RIGHT HEART CATH;  Surgeon: Kit Bacon MD;  Location:  HEART CARDIAC CATH LAB     ENDOBRONCHIAL ULTRASOUND FLEXIBLE N/A 9/17/2020    Procedure: BRONCHOSCOPY, flexible, endobronchial ultrasound with transbronchial biopsies, endobronchial biopsies;  Surgeon: Bill Lemus MD;  Location:  OR      PRQ TRLUML CORONARY ANGIOPLASTY ADDL BRANCH      PCI and ALYSSA to ostial LAD on 6/27/18     IMPLANT AUTOMATIC IMPLANTABLE CARDIOVERTER DEFIBRILLATOR       INSERT VENTRICULAR ASSIST DEVICE LEFT (HEARTMATE II) N/A 12/31/2018    Procedure: Median Sternotomy, On Cardiopulmonary Bypass Pump, Insertion of Left Ventricular Assist Device (Heartmate III);  Surgeon: Kamaljit Baker MD;  Location: [facility] OR     PICC DOUBLE LUMEN PLACEMENT Right 05/22/2020    5FR DL PICC inserted at right basilic vein, length 38cm.     TRANSPLANT HEART RECIPIENT AFTER HEART MATE N/A 5/18/2020    Procedure: REDO MEDIAN STERNOTOMY, LYSIS OF ADHESIONS, ON CARDIOPULMONARY BYPASS PUMP, HEART TRANSPLANT RECIPIENT, REMOVAL OF LEFT VENTRICULAR ASSIST DEVICE, REMOVAL OF AUTOMATED IMPLANTABLE CARDIOVERTER DEFIBRILLATOR;  Surgeon: Elder Willis MD;  Location:  OR               [facility]  History:     Social History     Tobacco Use     Smoking status: Never Smoker     Smokeless tobacco: Never Used   Substance Use Topics     Alcohol use: No     Frequency: Never             Family History:   I have reviewed this patient's family history          Immunizations:     Immunization History   Administered Date(s) Administered     HepA-Adult 06/14/2019, 01/03/2020     HepB-Adult 06/07/2019, 07/11/2019, 01/03/2020     Mantoux Tuberculin Skin Test 01/12/2019     Pneumo Conj 13-V (2010&after) 06/07/2019     TDAP Vaccine (Boostrix) 08/28/2007, 06/07/2019             Allergies:   No Known Allergies          Medications:     Current Outpatient Medications   Medication Sig     ACCU-CHEK [place]LOTTE PLUS test strip Check BG 3 times daily at meals and at bedtime.     acetaminophen (TYLENOL) 325 MG tablet Take 2 tablets (650 mg) by mouth every 4 hours as needed for other (multimodal surgical pain management along with NSAIDS and opioid medication as indicated based on pain control and physical function.)     amLODIPine (NORVASC) 5 MG tablet Take 1 tablet (5 mg) by mouth daily     BD SILVANA U/F 32G X 4 MM insulin pen needle Use 6 times daily.     calcium carbonate 600 mg-vitamin D 400 units (CALTRATE) 600-400 MG-UNIT per tablet Take 1 tablet by mouth 2 times daily (with meals)     Continuous Blood Gluc  (DEXCOM G6 ) DON 1 each daily     Continuous Blood Gluc Sensor (DEXCOM G6 SENSOR) MISC 1 each every 10 days     Continuous Blood Gluc Transmit (DEXCOM G6 TRANSMITTER) MISC 1 each every 3 months     hydrALAZINE (APRESOLINE) 50 MG tablet Take 2 tablets (100 mg) by mouth 3 times daily     insulin aspart (NOVOLOG PEN) 100 UNIT/ML pen Inject 1-14 units per custom scale  For Pre-Meal BG less than 140, no additional insulin needed   to 159 give 1 units.    to 179 give 2 units.    to 199 give 3 units.    to 219 give 4 units.    to 239 give 5 units.    to 259 give 6 units.    to  279 give 7 units.    to 299 give 8 units.    to 319 give 9 units.    to 339 give 10 units.    to 359 give 11 units.    to 379 give 12 units.   to 399 give 13 units.  BG >/=400 give 14 units.     insulin aspart (NOVOLOG PEN) 100 UNIT/ML pen Inject 1-11 Units Subcutaneous At Bedtime Inject 1-11 units at bedtime per custom scale   For Bedtime BG less than 200, no additional insulin needed   to 219 give 1 units.    to 239 give 2 units.    to 259 give 3 units.    to 279 give 4 units.    to 299 give 5 units.    to 319 give 6 units.    to 339 give 7 units.    to 359 give 8 units    to 379 give 9 units.   to 399 give 10 units.  BG >/=400 give 11 units.     insulin glargine (LANTUS PEN) 100 UNIT/ML pen Inject 24 Units Subcutaneous At Bedtime     loperamide (IMODIUM) 2 MG capsule Take 1 capsule (2 mg) by mouth 4 times daily as needed for diarrhea     magnesium oxide (MAG-OX) 400 (241.3 Mg) MG tablet Take 1 tablet (400 mg) by mouth 2 times daily     multivitamin w/minerals (THERA-VIT-M) tablet Take 1 tablet by mouth daily     mycophenolate (GENERIC EQUIVALENT) 500 MG tablet Take 1 tablet (500 mg) by mouth 2 times daily     NOVOLOG FLEXPEN 100 UNIT/ML soln INJECT 1 TO 14 UNITS UNDER THE SKIN  WITH MEALS AND 1 TO 11 UNITS AT BEDTIME PER SLIDING SCALES, AND INJECT 1 UNIT UNDER THE SKIN PER 5 GRAMS OF CARBOHYDRATES WITH MEALS AND SNACKS     pantoprazole (PROTONIX) 40 MG EC tablet Take 1 tablet (40 mg) by mouth every morning (before breakfast)     rosuvastatin (CRESTOR) 10 MG tablet TAKE ONE TABLET BY MOUTH ONCE DAILY     tacrolimus (GENERIC EQUIVALENT) 1 MG capsule Take 4mg (4 caps) in the AM and 3mg (3 caps) in the PM.     No current facility-administered medications for this visit.             Review of Systems:   As mentioned in the interim history otherwise negative by reviewing constitutional symptoms, central and peripheral  neurological systems, respiratory system, cardiac system, GI system,  system, musculoskeletal, skin, allergy, and lymphatics.                  Physical Exam:   There were no vitals taken for this visit. it was a virtual visit.   He did not cough during this virtual visit, he did not sound short of breath.            Laboratory Data:     Absolute CD4   Date Value Ref Range Status   09/12/2020 359 (L) 441 - 2,156 cells/uL Final       Inflammatory Markers    Recent Labs   Lab Test 07/23/18  0645   CRP 11.0*       Immune Globulin Studies      Recent Labs   Lab Test 09/13/20  0612   *   IGM 29*   *          Metabolic Studies    Recent Labs   Lab Test 10/29/20  0955 09/30/20  0835 09/24/20  0451 09/23/20  0531 09/22/20  0530 09/21/20  0527 09/13/20  0804 09/13/20  0804 09/01/20  0658 09/01/20  0658 06/15/20  0826 06/15/20  0826    136 136 135 135 137   < >  --    < > 134   < > 139   POTASSIUM 4.1 4.6 5.0 5.0 4.9 5.1   < >  --    < > 3.8   < > 4.8   CHLORIDE 106 108 109 107 108 110*   < >  --    < > 104   < > 110*   CO2 23 20 19* 20 20 20   < >  --    < > 22   < > 21   ANIONGAP 8 8 8 8 8 7   < >  --    < > 9   < > 8   BUN 44* 40* 35* 35* 33* 37*   < >  --    < > 21   < > 98*   CR 1.89* 1.92* 1.70* 1.72* 1.76* 1.86*   < >  --    < > 1.38*   < > 1.89*   GFRESTIMATED 38* 38* 44* 43* 42* 39*   < >  --    < > 56*   < > 38*   * 172* 107* 180* 160* 160*   < >  --    < > 118*   < > 120*   A1C 6.2*  --   --   --   --   --   --   --   --   --    < > 7.8*   ARLENE 9.2 9.2 9.0 9.2 8.5 8.5   < >  --    < > 8.5   < > 9.0   PHOS 4.1 4.3 4.1 3.3 3.3 3.6   < >  --   --   --    < > 4.4   MAG 1.5* 1.5* 2.0 2.0 1.8 1.6   < >  --    < > 1.4*   < > 1.7   LACT  --   --   --   --   --   --   --  0.7  --  0.9   < >  --    CKT 69  --   --   --   --   --   --   --   --   --   --  132    < > = values in this interval not displayed.       Hepatic Studies    Recent Labs   Lab Test 10/29/20  0955 09/30/20  0857  09/24/20  0451 09/22/20  0530 09/21/20  0527 09/20/20  0605 09/12/20  1204 09/12/20  1204 09/01/20  0658 09/01/20  0658   BILITOTAL 0.8 0.5 0.3 0.5 0.4 0.3   < >  --    < > 0.9   ALKPHOS 64 69 75 84 84 100   < >  --    < > 62   PROTTOTAL 7.5 7.0 6.0* 6.1* 6.0* 5.9*   < >  --    < > 6.1*   ALBUMIN 4.0 3.1* 2.3* 2.3* 2.1* 2.0*   < >  --    < > 2.4*   AST 22 19 30 38 52* 105*   < >  --    < > 16   ALT 17 23 46 69 82* 109*   < >  --    < > 11   LDH  --   --   --   --   --   --   --  380*  --  228*    < > = values in this interval not displayed.       Hematology Studies     Recent Labs   Lab Test 10/29/20  0955 09/30/20  0835 09/24/20  0451 09/23/20  0531 09/21/20  0527 09/20/20  0605 09/05/20  0549 09/05/20  0549 09/04/20  0543 09/03/20  0526 09/02/20  0551   WBC 2.1* 4.4 3.0* 3.7* 3.3* 3.8*   < > 5.3 3.8* 3.1* 2.8*   ANEU 1.0*  --   --   --  2.2  --   --  4.1 3.1 2.4 1.8   ALYM 0.8  --   --   --  0.7*  --   --  0.8 0.3* 0.3* 0.7*   MARTHA 0.2  --   --   --  0.2  --   --  0.3 0.2 0.2 0.2   AEOS 0.0  --   --   --  0.0  --   --  0.0 0.2 0.1 0.1   HGB 10.3* 8.9* 8.2* 9.1* 8.1* 8.2*   < > 8.3* 8.7* 8.3* 8.1*   HCT 32.8* 29.1* 26.8* 30.1* 26.7* 27.7*   < > 26.5* 27.5* 27.0* 26.7*    230 307 345 348 380   < > 353 376 333 354    < > = values in this interval not displayed.       Clotting Studies    Recent Labs   Lab Test 08/26/20  0939 06/04/20  0900 06/01/20  0856 05/18/20  1835 05/18/20  1630 05/18/20  1248 05/18/20  0010   INR 1.06 1.16* 1.16* 1.54* 2.21* 1.42* 2.19*   PTT  --   --   --  31 31 34 38*       Urine Studies    Recent Labs   Lab Test 09/16/20  1620 09/14/20  2020 08/31/20  1615 08/20/20  1630 08/18/20  1404   URINEPH 5.5 5.5 5.5 5.5 5.5   NITRITE Negative Negative Negative Negative Negative   LEUKEST Trace* Negative Negative Small* Moderate*   WBCU 5 4 1 12* 28*         Microbiology:  Last 6 Culture results with specimen source  Culture Micro   Date Value Ref Range Status   09/17/2020 No Actinomyces species  isolated  Final   09/17/2020 Culture negative for acid fast bacilli  Final   09/17/2020   Final    Assayed at [facility] [address] [place] [address] [phone]   09/17/2020 Culture negative after 4 weeks  Final   09/17/2020 No Legionella species isolated  Final   09/17/2020 No growth after 4 weeks  Final   09/17/2020 (A)  Final    On day 3, isolated in broth only:  Abiotrophia defectiva  Susceptibility testing not routinely done      Specimen Description   Date Value Ref Range Status   09/17/2020 Lung Right upper lobe Lesion  Final   09/17/2020 Lung Right upper lobe Lesion  Final   09/17/2020 Lung Right upper lobe Lesion  Final   09/17/2020 Lung Right upper lobe Lesion  Final   09/17/2020 Lung Right upper lobe Lesion  Final   09/17/2020 Lung Right upper lobe Lesion  Final   09/17/2020 Lung Right upper lobe Lesion  Final   09/17/2020 Lung Right upper lobe Lesion  Final   09/17/2020 Lung Right upper lobe Lesion  Final        Last check of C difficile  C Diff Toxin B PCR   Date Value Ref Range Status   09/17/2020 Negative NEG^Negative Final     Comment:     Negative: C. difficile target DNA sequences NOT detected, presumed negative   for C.difficile toxin B or the number of bacteria present may be below the   limit of detection for the test.  FDA approved assay performed using [employer] GeneXpert real-time PCR.  A negative result does not exclude actual disease due to C. difficile and may   be due to improper collection, handling and storage of the specimen or the   number of organisms in the specimen is below the detection limit of the assay.           Virology:  CMV viral loads    CMV viral loads    CMV DNA Quant (External)   Date Value Ref Range Status   11/12/2020 Undetected Undetected IU/mL Final     CMV viral loads    Recent Labs   Lab Test 11/12/20  1514 10/29/20  0955 09/13/20  0612   CSPEC  --  Plasma Plasma   CMVLOG  --  Not Calculated Not Calculated   68263 Undetected  --   --        CMV  viral loads    Log IU/mL of CMVQNT   Date Value Ref Range Status   10/29/2020 Not Calculated <2.1 [Log_IU]/mL Final   09/13/2020 Not Calculated <2.1 [Log_IU]/mL Final   09/01/2020 Not Calculated <2.1 [Log_IU]/mL Final   08/19/2020 Not Calculated <2.1 [Log_IU]/mL Final   08/13/2020 Not Calculated <2.1 [Log_IU]/mL Final   06/15/2020 Not Calculated <2.1 [Log_IU]/mL Final     CMV DNA Quant (External)   Date Value Ref Range Status   11/12/2020 Undetected Undetected IU/mL Final       CMV resistance testing  No lab results found.  No results found for: CMVCID, CMVFOS, CMVGAN     EBV viral loads     Recent Labs   Lab Test 10/29/20  0955 09/01/20  0658 08/13/20  0840 06/15/20  0826   EBRES EBV DNA Not Detected EBV DNA Not Detected EBV DNA Not Detected EBV DNA Not Detected     EBV DNA Copies/mL   Date Value Ref Range Status   10/29/2020 EBV DNA Not Detected EBVNEG^EBV DNA Not Detected [Copies]/mL Final   09/01/2020 EBV DNA Not Detected EBVNEG^EBV DNA Not Detected [Copies]/mL Final   08/13/2020 EBV DNA Not Detected EBVNEG^EBV DNA Not Detected [Copies]/mL Final   06/15/2020 EBV DNA Not Detected EBVNEG^EBV DNA Not Detected [Copies]/mL Final       Human Herpes Virus 6 viral loads    No results found for: H6RES No results found for: H6SPEC    CMV Antibody IgG   Date Value Ref Range Status   05/18/2020 0.3 0.0 - 0.8 AI Final     Comment:     Negative  Antibody index (AI) values reflect qualitative changes in antibody   concentration that cannot be directly associated with clinical condition or   disease state.       Toxoplasma Antibody IgG   Date Value Ref Range Status   05/18/2020 <3.0 0.0 - 7.1 IU/mL Final     Comment:     Negative- Absence of detectable Toxoplasma gondii IgG antibodies. A negative   result does not rule out acute infection.  The magnitude of the measured result is not indicative of the amount of   antibody present. The concentrations of anti-Toxoplasma gondii IgG in a given   specimen determined with assays from  different manufacturers can vary due to   differences in assay methods and reagent specificity.         Pathology:  RUL FNA 9/17/2020  Lung, right upper lobe lesion, endobronchial ultrasound guided fine needle    aspiration:   - Negative for malignancy   TBBx 9/8/2020  FINAL DIAGNOSIS:   A. No specimen received.   B. LUNG, RIGHT UPPER LOBE, ENDOBRONCHIAL BIOPSIES:   - Two small fragments of respiratory mucosa with chronic inflammation,   frequent eosinophils, submucosal edema   and focal squamous metaplasia.   - Negative for granulomatous inflammation or malignancy in this biopsy,     Imaging:  CT chest 10/29/2020  IMPRESSION:  1. Grossly unchanged right perihilar consolidative mass/soft tissue  thickening with improved mass effect on the right mainstem bronchus  and right upper lobe bronchi. Additionally, there is mildly improved  prominent mediastinal lymph nodes and right hilar lymphadenopathy.  These findings are most consistent with improving  infectious/postinfectious etiology. Recommend follow up to resolution  as malignancy is not entirely excluded.  2. Resolution of previous right lower lobe bronchocentric  reticulonodular opacities.  3. Additional incisional/chronic findings as noted above, not  significantly changed from 10/2/2020.  CT chest 10/2/2020  IMPRESSION:  Abnormalities are stable to slightly improved from  9/21/2020.  CT 9/21/2020 reviewed with the radiologist  Impression: Positive treatment response as evidenced by:  1. Moderate decrease in size of right suprahilar mass. This mass has  aggressive features including mediastinal and bronchial invasion as  well as mediastinal lymphadenopathy. These invasive features have  improved.  2. Previously occluded right upper lobe bronchi are now patent,  although remains mildly narrowed.  3. Improvement and bronchocentric groundglass and nodular opacities in  the right lower lobe favored to be infectious.  4. Near complete resolution of right-sided  pleural effusion with  resolution of intralobular septal thickening in the right lower lobe.  CT chest 9/13/2020  IMPRESSION:  1. Increased size of the right hilar mass/consolidation with  associated narrowing of the subsegmental right upper lobe bronchioles  and multiple scattered pulmonary nodules as above. Differential  continues to include infection versus malignancy with postobstructive  pneumonia.  2. Increased bibasilar groundglass and right lung tree-in-bud nodular  opacities suggestive of infection, possibly secondary to aspiration.  Small right pleural effusion.  3. Surgical changes of cardiac transplantation.  4. Cholelithiasis without cholecystitis.  CT chest 9/2/2020   IMPRESSION:   1. New mass vs consolidation in the right upper lobe and right hilum  since 7/29/2020 with narrowing of multiple right upper lobe bronchi.   1a. Differential pneumonia with reactive right hilar lymphadenopathy  vs. malignancy(ex. Lymphoma/PTLD) with post obstructive pneumonia.  2. Stable additional pulmonary lesions as above.  3. Stable postsurgical changes of heart transplant.  4. Cholelithiasis without evidence of cholecystitis.        Again, thank you for allowing me to participate in the care of your patient.      Sincerely,    Tavares Rodriguez MD

## 2020-12-01 NOTE — PROGRESS NOTES
"Mariusz Sharp is a 60 year old male who is being evaluated via a billable video visit.      The patient has been notified of following:     \"This video visit will be conducted via a call between you and your physician/provider. We have found that certain health care needs can be provided without the need for an in-person physical exam.  This service lets us provide the care you need with a video conversation.  If a prescription is necessary we can send it directly to your pharmacy.  If lab work is needed we can place an order for that and you can then stop by our lab to have the test done at a later time.    Video visits are billed at different rates depending on your insurance coverage.  Please reach out to your insurance provider with any questions.    If during the course of the call the physician/provider feels a video visit is not appropriate, you will not be charged for this service.\"    Patient has given verbal consent for Video visit? Yes  How would you like to obtain your AVS? MyChart  If you are dropped from the video visit, the video invite should be resent to: Text to cell phone: [phone]  Will anyone else be joining your video visit? No        Video-Visit Details  We had to convert to phone visit as the patient was not able to connect to the internet.   The phone visit lasted 15 minutes.       [facility]    Transplant Infectious Diseases Outpatient Progress note      Patient:  Mariusz Sharp, Date of birth 1960, Medical record number [id number]  Date of Visit:  12/01/2020         Recommendations:   1. Continue off of ABx.   2. Repeat CT chest without contrast when he's in town on 12/23/2020.   3. Needs the shingrix vaccine series and the 23-valent pneumovax.     Discontinue cipro and clindamycin as planned on 10/11/2020.   2. CT chest next time you're in town 10/29/2020.   - further recommendations depends on symptoms and CT findings.     RTC: sometime in 1/2021.  "         Summary of Presentation:   Transplants:  5/18/2020 (Heart), Postoperative day:  197     This patient is a 59 year old male with ICMP s/p OHT basiliximab for induction, TAC/MMF for maintenance IS.   Recently discharged after hospitalization for cough, fever, shortness of breath and post tussive LOC. Treated for RUL pneumonia.         Active Problems and Infectious Diseases Issues:   1. Likely aspiration pneumonia vs postobstructive pneumonia  The patient responded to IV zosyn while he was in the hospital with resolution of fever and improvement of chest CT. His clinical and radiological findings did not improve with posaconazole, which was discontinued.   The patient finished a total of 4 weeks of ABx on 10/11/2020.   The cough persists but the frequency is improving. No fever or chills. CT is slowly improving but still with right hilar infiltration.  Appetite improving and gaining weight.       The BALs on 9/8/2020, 9/15/2020 and 9/17/2020 were all unremarkable except for the growth of Abiotrophia defectiva (oral simona).   The TBBx were all non-diagnostic/unremarkable.     Will repeat CT the end of 12/2020.           Old Problems and Infectious Diseases Issues:   1. 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   2. C acnes in the extracted VAD; treated with vancomycin then doxycycline.   3. Was diagnosed with possible chronic prostatitis and was treated with ABx for longer than one month between 8/2020 and 10/2020.       Other Infectious Disease issues include:  - QTc 475 as of 9/16/2020.   - PCP prophylaxis: bactrim caused hyperkalemia, then pentamidine. Now off PJP ppx.   - Serostatus: CMV D+/R-, EBV D-/R+, HSV1?/2?, VZV +, Toxo D-/R-     On VGCV ppx per protocol.   - Gamma globulin status: 510 as of 9/13/2020       Attestation:  I interviewed the patient and obtained history from the patient, and by reviewing the patient's chart including outside records,  "microbiological data, and radiological data. All data are summarized in this notes.  Tavares Rodriguez MD   Pager: [phone]  12/01/2020         Interim History:   Remains afebrile. Still with cough but the frequency of the cough is improving. No hemoptysis. The cough is productive of \"merky\" phlegm. No more post-tussive syncope.   The patient's appetite has improved and he's gaining weight.   No other complaints.         HPI:       Transplants:  5/18/2020 (Heart); Postoperative day:  197  In summary:  Presented to the hospital with 24 hr of dyspnea, fever, and cough with post tussive LOC.   He was found to have RUL consolidation vs obstructive LN vs mass.   Non-invasive workup with urine and blood Histo and Blasto Ag were negative, A galactomannan negative, BD glucan low positive at 86 and 90.  He was treated with posaconazole empirically from 9/4/2020 till 9/13/202 when he spiked fever and CT showed worsening of the mass/consolidation. Zosyn was initiated on 9/13/2020 for possible post obstructive pneumonia with resolution of fever and posaconazole was discontinued due to lack of effectiveness.   Bronchoscopy with TBBx on 9/8/2020 and 9/15/2020 were both not diagnostic. FNA on 9/17/202 and BAL the same day were also not significant.   The biopsy from 9/8/2020 was also sent for NGS by PCR of 28S DNA and was negative for fungal infection.   EBV was not detected.     Given the significant improvement with zosyn, it was believed that the RUL infiltrate was due to aspiration or postobstructive pneumonia. He was discharged on cipro and clindamycin PO till 10/11/2020 for total of 4 weeks.            Past Medical History:     Past Medical History:   Diagnosis Date     Acute kidney injury (H)      CKD (chronic kidney disease)      Coronary artery disease      Diabetes mellitus (H)      HTN (hypertension)      Hyperlipidemia      ICD (implantable cardioverter-defibrillator), single chamber- NOT dependent 7/17/2018     " Ischemic cardiomyopathy      LV (left ventricular) mural thrombus      Paroxysmal atrial flutter (H)      RVF (right ventricular failure) (H)      Systolic heart failure (H)      Ventricular tachycardia (H)              Past Surgical History:     Past Surgical History:   Procedure Laterality Date     BRONCHOSCOPY (RIGID OR FLEXIBLE), DIAGNOSTIC N/A 9/15/2020    Procedure: BRONCHOSCOPY, WITH BRONCHOALVEOLAR LAVAGE;  Surgeon: Elder Mejia MD;  Location:  GI     BRONCHOSCOPY (RIGID OR FLEXIBLE), DIAGNOSTIC N/A 9/17/2020    Procedure: BRONCHOSCOPY, WITH BRONCHOALVEOLAR LAVAGE;  Surgeon: Bill Lemus MD;  Location: [facility] OR     BRONCHOSCOPY RIGID OR FLEXIBLE W/TRANSENDOSCOPIC ENDOBRONCHIAL ULTRASOUND GUIDED Right 9/8/2020    Procedure: BRONCHOSCOPY, WITH ENDOBRONCHIAL ULTRASOUND;  Surgeon: Donny Mercedes MD;  Location:  GI     COLONOSCOPY N/A 12/7/2018    Procedure: COLONOSCOPY;  Surgeon: Krish Mercado MD;  Location: [facility] OR     CV HEART BIOPSY N/A 5/24/2020    Procedure: Heart Biopsy;  Surgeon: Kit Bacon MD;  Location:  HEART CARDIAC CATH LAB     CV HEART BIOPSY N/A 6/1/2020    Procedure: CV HEART BIOPSY;  Surgeon: Kit Bacon MD;  Location:  HEART CARDIAC CATH LAB     CV HEART BIOPSY N/A 6/8/2020    Procedure: CV HEART BIOPSY;  Surgeon: Kit Bacon MD;  Location:  HEART CARDIAC CATH LAB     CV HEART BIOPSY N/A 6/15/2020    Procedure: CV HEART BIOPSY;  Surgeon: Kit Bacon MD;  Location:  HEART CARDIAC CATH LAB     CV HEART BIOPSY N/A 6/30/2020    Procedure: CV HEART BIOPSY;  Surgeon: Kit Bacon MD;  Location:  HEART CARDIAC CATH LAB     CV HEART BIOPSY N/A 7/14/2020    Procedure: CV HEART BIOPSY;  Surgeon: Brian Long MD;  Location:  HEART CARDIAC CATH LAB     CV HEART BIOPSY N/A 7/29/2020    Procedure: CV HEART BIOPSY;  Surgeon: Momo Hunt MD;  Location:  HEART CARDIAC CATH  LAB     CV HEART BIOPSY N/A 8/13/2020    Procedure: CV HEART BIOPSY;  Surgeon: Khris Mcclelland MD;  Location: [facility]     CV HEART BIOPSY N/A 8/26/2020    Procedure: CV HEART BIOPSY;  Surgeon: Momo Hunt MD;  [facility]: [facility]     CV HEART BIOPSY N/A 9/30/2020    Procedure: CV HEART BIOPSY;  Surgeon: Artemio Ulloa MD;  [facility]: [facility]     CV HEART BIOPSY N/A 10/29/2020    Procedure: CV HEART BIOPSY;  Surgeon: Kit Bacon MD;  Location: [facility] LAB     CV RIGHT HEART CATH N/A 2/19/2019    Procedure: RHC;  Surgeon: Brian Long MD;  Location: [facility]     CV RIGHT HEART CATH N/A 7/5/2019    Procedure: CV RIGHT HEART CATH;  Surgeon: Khris Mcclelland MD;  Location: [facility]     CV RIGHT HEART CATH N/A 5/24/2020    Procedure: Right Heart Cath;  Surgeon: Kit Bacon MD;  [facility]     CV RIGHT HEART CATH N/A 6/1/2020    Procedure: CV RIGHT HEART CATH;  Surgeon: Kit Bacon MD;  Location: [facility] CARD[facility] CATH LAB     CV RIGHT HEART CATH N/A 6/8/2020    Procedure: CV RIGHT HEART CATH;  Surgeon: Kit Bacon MD;  [facility]ARD[facility]     CV RIGHT HEART CATH N/A 6/15/2020    Procedure: CV RIGHT HEART CATH;  Surgeon: Kit Bacon MD;  [facility]     CV RIGHT HEART CATH N/A 6/30/2020    Procedure: CV RIGHT HEART CATH;  Surgeon: Kit Bacon MD;  [facility]     CV RIGHT HEART CATH N/A 7/14/2020    Procedure: CV RIGHT HEART CATH;  Surgeon: Brian Long MD;  [facility]: [facility]     CV RIGHT HEART CATH N/A 7/29/2020    Procedure: CV RIGHT HEART CATH;  Surgeon: Momo Hunt MD;  Location:  HEART CARDIAC CATH LAB     CV RIGHT HEART CATH N/A 8/13/2020    Procedure:  CV RIGHT HEART CATH;  Surgeon: Khris Mcclelland MD;  Location:  HEART CARDIAC CATH LAB     CV RIGHT HEART CATH N/A 8/26/2020    Procedure: CV RIGHT HEART CATH;  Surgeon: Momo Hunt MD;  Location:  HEART CARDIAC CATH LAB     CV RIGHT HEART CATH N/A 9/30/2020    Procedure: Right Heart Cath;  Surgeon: Artemio Ulloa MD;  Location:  HEART CARDIAC CATH LAB     CV RIGHT HEART CATH N/A 10/29/2020    Procedure: CV RIGHT HEART CATH;  Surgeon: Kit Bacon MD;  Location:  HEART CARDIAC CATH LAB     ENDOBRONCHIAL ULTRASOUND FLEXIBLE N/A 9/17/2020    Procedure: BRONCHOSCOPY, flexible, endobronchial ultrasound with transbronchial biopsies, endobronchial biopsies;  Surgeon: Bill Lemus MD;  Location:  OR      PRQ TRLUML CORONARY ANGIOPLASTY ADDL BRANCH      PCI and ALYSSA to ostial LAD on 6/27/18     IMPLANT AUTOMATIC IMPLANTABLE CARDIOVERTER DEFIBRILLATOR       INSERT VENTRICULAR ASSIST DEVICE LEFT (HEARTMATE II) N/A 12/31/2018    Procedure: Median Sternotomy, On Cardiopulmonary Bypass Pump, Insertion of Left Ventricular Assist Device (Heartmate III);  Surgeon: Kamaljit Baker MD;  Location:  OR     PICC DOUBLE LUMEN PLACEMENT Right 05/22/2020    5FR DL PICC inserted at right basilic vein, length 38cm.     TRANSPLANT HEART RECIPIENT AFTER HEART MATE N/A 5/18/2020    Procedure: REDO MEDIAN STERNOTOMY, LYSIS OF ADHESIONS, ON CARDIOPULMONARY BYPASS PUMP, HEART TRANSPLANT RECIPIENT, REMOVAL OF LEFT VENTRICULAR ASSIST DEVICE, REMOVAL OF AUTOMATED IMPLANTABLE CARDIOVERTER DEFIBRILLATOR;  Surgeon: Elder Willis MD;  Location:  OR               Social History:     Social History     Tobacco Use     Smoking status: Never Smoker     Smokeless tobacco: Never Used   Substance Use Topics     Alcohol use: No     Frequency: Never             Family History:   I have reviewed this patient's family history          Immunizations:     Immunization History   Administered Date(s)  Administered     HepA-Adult 06/14/2019, 01/03/2020     HepB-Adult 06/07/2019, 07/11/2019, 01/03/2020     Mantoux Tuberculin Skin Test 01/12/2019     Pneumo Conj 13-V (2010&after) 06/07/2019     TDAP Vaccine (Boostrix) 08/28/2007, 06/07/2019             Allergies:   No Known Allergies          Medications:     Current Outpatient Medications   Medication Sig     ACCU-CHEK [place] PLUS test strip Check BG 3 times daily at meals and at bedtime.     acetaminophen (TYLENOL) 325 MG tablet Take 2 tablets (650 mg) by mouth every 4 hours as needed for other (multimodal surgical pain management along with NSAIDS and opioid medication as indicated based on pain control and physical function.)     amLODIPine (NORVASC) 5 MG tablet Take 1 tablet (5 mg) by mouth daily     BD SILVANA U/F 32G X 4 MM insulin pen needle Use 6 times daily.     calcium carbonate 600 mg-vitamin D 400 units (CALTRATE) 600-400 MG-UNIT per tablet Take 1 tablet by mouth 2 times daily (with meals)     Continuous Blood Gluc  (DEXCOM G6 ) DON 1 each daily     Continuous Blood Gluc Sensor (DEXCOM G6 SENSOR) MISC 1 each every 10 days     Continuous Blood Gluc Transmit (DEXCOM G6 TRANSMITTER) MISC 1 each every 3 months     hydrALAZINE (APRESOLINE) 50 MG tablet Take 2 tablets (100 mg) by mouth 3 times daily     insulin aspart (NOVOLOG PEN) 100 UNIT/ML pen Inject 1-14 units per custom scale  For Pre-Meal BG less than 140, no additional insulin needed   to 159 give 1 units.    to 179 give 2 units.    to 199 give 3 units.    to 219 give 4 units.    to 239 give 5 units.    to 259 give 6 units.    to 279 give 7 units.    to 299 give 8 units.    to 319 give 9 units.    to 339 give 10 units.    to 359 give 11 units.    to 379 give 12 units.   to 399 give 13 units.  BG >/=400 give 14 units.     insulin aspart (NOVOLOG PEN) 100 UNIT/ML pen Inject 1-11 Units Subcutaneous At Bedtime  Inject 1-11 units at bedtime per custom scale   For Bedtime BG less than 200, no additional insulin needed   to 219 give 1 units.    to 239 give 2 units.    to 259 give 3 units.    to 279 give 4 units.    to 299 give 5 units.    to 319 give 6 units.    to 339 give 7 units.    to 359 give 8 units    to 379 give 9 units.   to 399 give 10 units.  BG >/=400 give 11 units.     insulin glargine (LANTUS PEN) 100 UNIT/ML pen Inject 24 Units Subcutaneous At Bedtime     loperamide (IMODIUM) 2 MG capsule Take 1 capsule (2 mg) by mouth 4 times daily as needed for diarrhea     magnesium oxide (MAG-OX) 400 (241.3 Mg) MG tablet Take 1 tablet (400 mg) by mouth 2 times daily     multivitamin w/minerals (THERA-VIT-M) tablet Take 1 tablet by mouth daily     mycophenolate (GENERIC EQUIVALENT) 500 MG tablet Take 1 tablet (500 mg) by mouth 2 times daily     NOVOLOG FLEXPEN 100 UNIT/ML soln INJECT 1 TO 14 UNITS UNDER THE SKIN  WITH MEALS AND 1 TO 11 UNITS AT BEDTIME PER SLIDING SCALES, AND INJECT 1 UNIT UNDER THE SKIN PER 5 GRAMS OF CARBOHYDRATES WITH MEALS AND SNACKS     pantoprazole (PROTONIX) 40 MG EC tablet Take 1 tablet (40 mg) by mouth every morning (before breakfast)     rosuvastatin (CRESTOR) 10 MG tablet TAKE ONE TABLET BY MOUTH ONCE DAILY     tacrolimus (GENERIC EQUIVALENT) 1 MG capsule Take 4mg (4 caps) in the AM and 3mg (3 caps) in the PM.     No current facility-administered medications for this visit.             Review of Systems:   As mentioned in the interim history otherwise negative by reviewing constitutional symptoms, central and peripheral neurological systems, respiratory system, cardiac system, GI system,  system, musculoskeletal, skin, allergy, and lymphatics.                  Physical Exam:   There were no vitals taken for this visit. it was a virtual visit.   He did not cough during this virtual visit, he did not sound short of breath.             Laboratory Data:     Absolute CD4   Date Value Ref Range Status   09/12/2020 359 (L) 441 - 2,156 cells/uL Final       Inflammatory Markers    Recent Labs   Lab Test 07/23/18  0645   CRP 11.0*       Immune Globulin Studies      Recent Labs   Lab Test 09/13/20  0612   *   IGM 29*   *          Metabolic Studies    Recent Labs   Lab Test 10/29/20  0955 09/30/20  0835 09/24/20  0451 09/23/20  0531 09/22/20  0530 09/21/20  0527 09/13/20  0804 09/13/20  0804 09/01/20  0658 09/01/20  0658 06/15/20  0826 06/15/20  0826    136 136 135 135 137   < >  --    < > 134   < > 139   POTASSIUM 4.1 4.6 5.0 5.0 4.9 5.1   < >  --    < > 3.8   < > 4.8   CHLORIDE 106 108 109 107 108 110*   < >  --    < > 104   < > 110*   CO2 23 20 19* 20 20 20   < >  --    < > 22   < > 21   ANIONGAP 8 8 8 8 8 7   < >  --    < > 9   < > 8   BUN 44* 40* 35* 35* 33* 37*   < >  --    < > 21   < > 98*   CR 1.89* 1.92* 1.70* 1.72* 1.76* 1.86*   < >  --    < > 1.38*   < > 1.89*   GFRESTIMATED 38* 38* 44* 43* 42* 39*   < >  --    < > 56*   < > 38*   * 172* 107* 180* 160* 160*   < >  --    < > 118*   < > 120*   A1C 6.2*  --   --   --   --   --   --   --   --   --    < > 7.8*   ARLENE 9.2 9.2 9.0 9.2 8.5 8.5   < >  --    < > 8.5   < > 9.0   PHOS 4.1 4.3 4.1 3.3 3.3 3.6   < >  --   --   --    < > 4.4   MAG 1.5* 1.5* 2.0 2.0 1.8 1.6   < >  --    < > 1.4*   < > 1.7   LACT  --   --   --   --   --   --   --  0.7  --  0.9   < >  --    CKT 69  --   --   --   --   --   --   --   --   --   --  132    < > = values in this interval not displayed.       Hepatic Studies    Recent Labs   Lab Test 10/29/20  0955 09/30/20  0835 09/24/20  0451 09/22/20  0530 09/21/20  0527 09/20/20  0605 09/12/20  1204 09/12/20  1204 09/01/20  0658 09/01/20  0658   BILITOTAL 0.8 0.5 0.3 0.5 0.4 0.3   < >  --    < > 0.9   ALKPHOS 64 69 75 84 84 100   < >  --    < > 62   PROTTOTAL 7.5 7.0 6.0* 6.1* 6.0* 5.9*   < >  --    < > 6.1*   ALBUMIN 4.0 3.1* 2.3* 2.3* 2.1* 2.0*    < >  --    < > 2.4*   AST 22 19 30 38 52* 105*   < >  --    < > 16   ALT 17 23 46 69 82* 109*   < >  --    < > 11   LDH  --   --   --   --   --   --   --  380*  --  228*    < > = values in this interval not displayed.       Hematology Studies     Recent Labs   Lab Test 10/29/20  0955 09/30/20  0835 09/24/20  0451 09/23/20  0531 09/21/20  0527 09/20/20  0605 09/05/20  0549 09/05/20  0549 09/04/20  0543 09/03/20  0526 09/02/20  0551   WBC 2.1* 4.4 3.0* 3.7* 3.3* 3.8*   < > 5.3 3.8* 3.1* 2.8*   ANEU 1.0*  --   --   --  2.2  --   --  4.1 3.1 2.4 1.8   ALYM 0.8  --   --   --  0.7*  --   --  0.8 0.3* 0.3* 0.7*   MARTHA 0.2  --   --   --  0.2  --   --  0.3 0.2 0.2 0.2   AEOS 0.0  --   --   --  0.0  --   --  0.0 0.2 0.1 0.1   HGB 10.3* 8.9* 8.2* 9.1* 8.1* 8.2*   < > 8.3* 8.7* 8.3* 8.1*   HCT 32.8* 29.1* 26.8* 30.1* 26.7* 27.7*   < > 26.5* 27.5* 27.0* 26.7*    230 307 345 348 380   < > 353 376 333 354    < > = values in this interval not displayed.       Clotting Studies    Recent Labs   Lab Test 08/26/20  0939 06/04/20  0900 06/01/20  0856 05/18/20  1835 05/18/20  1630 05/18/20  1248 05/18/20  0010   INR 1.06 1.16* 1.16* 1.54* 2.21* 1.42* 2.19*   PTT  --   --   --  31 31 34 38*       Urine Studies    Recent Labs   Lab Test 09/16/20  1620 09/14/20 2020 08/31/20  1615 08/20/20  1630 08/18/20  1404   URINEPH 5.5 5.5 5.5 5.5 5.5   NITRITE Negative Negative Negative Negative Negative   LEUKEST Trace* Negative Negative Small* Moderate*   WBCU 5 4 1 12* 28*         Microbiology:  Last 6 Culture results with specimen source  Culture Micro   Date Value Ref Range Status   09/17/2020 No Actinomyces species isolated  Final   09/17/2020 Culture negative for acid fast bacilli  Final   09/17/2020   Final    Assayed at [facility] [address] [place] [id number] [phone]   09/17/2020 Culture negative after 4 weeks  Final   09/17/2020 No Legionella species isolated  Final   09/17/2020 No growth after 4 weeks  Final    09/17/2020 (A)  Final    On day 3, isolated in broth only:  Abiotrophia defectiva  Susceptibility testing not routinely done      Specimen Description   Date Value Ref Range Status   09/17/2020 Lung Right upper lobe Lesion  Final   09/17/2020 Lung Right upper lobe Lesion  Final   09/17/2020 Lung Right upper lobe Lesion  Final   09/17/2020 Lung Right upper lobe Lesion  Final   09/17/2020 Lung Right upper lobe Lesion  Final   09/17/2020 Lung Right upper lobe Lesion  Final   09/17/2020 Lung Right upper lobe Lesion  Final   09/17/2020 Lung Right upper lobe Lesion  Final   09/17/2020 Lung Right upper lobe Lesion  Final        Last check of C difficile  C Diff Toxin B PCR   Date Value Ref Range Status   09/17/2020 Negative NEG^Negative Final     Comment:     Negative: C. difficile target DNA sequences NOT detected, presumed negative   for C.difficile toxin B or the number of bacteria present may be below the   limit of detection for the test.  FDA approved assay performed using [employer] GeneXpert real-time PCR.  A negative result does not exclude actual disease due to C. difficile and may   be due to improper collection, handling and storage of the specimen or the   number of organisms in the specimen is below the detection limit of the assay.           Virology:  CMV viral loads    CMV viral loads    CMV DNA Quant (External)   Date Value Ref Range Status   11/12/2020 Undetected Undetected IU/mL Final     CMV viral loads    Recent Labs   Lab Test 11/12/20  1514 10/29/20  0955 09/13/20  0612   CSPEC  --  Plasma Plasma   CMVLOG  --  Not Calculated Not Calculated   69586 Undetected  --   --        CMV viral loads    Log IU/mL of CMVQNT   Date Value Ref Range Status   10/29/2020 Not Calculated <2.1 [Log_IU]/mL Final   09/13/2020 Not Calculated <2.1 [Log_IU]/mL Final   09/01/2020 Not Calculated <2.1 [Log_IU]/mL Final   08/19/2020 Not Calculated <2.1 [Log_IU]/mL Final   08/13/2020 Not Calculated <2.1 [Log_IU]/mL Final    06/15/2020 Not Calculated <2.1 [Log_IU]/mL Final     CMV DNA Quant (External)   Date Value Ref Range Status   11/12/2020 Undetected Undetected IU/mL Final       CMV resistance testing  No lab results found.  No results found for: CMVCID, CMVFOS, CMVGAN     EBV viral loads     Recent Labs   Lab Test 10/29/20  0955 09/01/20  0658 08/13/20  0840 06/15/20  0826   EBRES EBV DNA Not Detected EBV DNA Not Detected EBV DNA Not Detected EBV DNA Not Detected     EBV DNA Copies/mL   Date Value Ref Range Status   10/29/2020 EBV DNA Not Detected EBVNEG^EBV DNA Not Detected [Copies]/mL Final   09/01/2020 EBV DNA Not Detected EBVNEG^EBV DNA Not Detected [Copies]/mL Final   08/13/2020 EBV DNA Not Detected EBVNEG^EBV DNA Not Detected [Copies]/mL Final   06/15/2020 EBV DNA Not Detected EBVNEG^EBV DNA Not Detected [Copies]/mL Final       Human Herpes Virus 6 viral loads    No results found for: H6RES No results found for: H6SPEC    CMV Antibody IgG   Date Value Ref Range Status   05/18/2020 0.3 0.0 - 0.8 AI Final     Comment:     Negative  Antibody index (AI) values reflect qualitative changes in antibody   concentration that cannot be directly associated with clinical condition or   disease state.       Toxoplasma Antibody IgG   Date Value Ref Range Status   05/18/2020 <3.0 0.0 - 7.1 IU/mL Final     Comment:     Negative- Absence of detectable Toxoplasma gondii IgG antibodies. A negative   result does not rule out acute infection.  The magnitude of the measured result is not indicative of the amount of   antibody present. The concentrations of anti-Toxoplasma gondii IgG in a given   specimen determined with assays from different manufacturers can vary due to   differences in assay methods and reagent specificity.         Pathology:  RUL FNA 9/17/2020  Lung, right upper lobe lesion, endobronchial ultrasound guided fine needle    aspiration:   - Negative for malignancy   TBBx 9/8/2020  FINAL DIAGNOSIS:   A. No specimen received.   B.  LUNG, RIGHT UPPER LOBE, ENDOBRONCHIAL BIOPSIES:   - Two small fragments of respiratory mucosa with chronic inflammation,   frequent eosinophils, submucosal edema   and focal squamous metaplasia.   - Negative for granulomatous inflammation or malignancy in this biopsy,     Imaging:  CT chest 10/29/2020  IMPRESSION:  1. Grossly unchanged right perihilar consolidative mass/soft tissue  thickening with improved mass effect on the right mainstem bronchus  and right upper lobe bronchi. Additionally, there is mildly improved  prominent mediastinal lymph nodes and right hilar lymphadenopathy.  These findings are most consistent with improving  infectious/postinfectious etiology. Recommend follow up to resolution  as malignancy is not entirely excluded.  2. Resolution of previous right lower lobe bronchocentric  reticulonodular opacities.  3. Additional incisional/chronic findings as noted above, not  significantly changed from 10/2/2020.  CT chest 10/2/2020  IMPRESSION:  Abnormalities are stable to slightly improved from  9/21/2020.  CT 9/21/2020 reviewed with the radiologist  Impression: Positive treatment response as evidenced by:  1. Moderate decrease in size of right suprahilar mass. This mass has  aggressive features including mediastinal and bronchial invasion as  well as mediastinal lymphadenopathy. These invasive features have  improved.  2. Previously occluded right upper lobe bronchi are now patent,  although remains mildly narrowed.  3. Improvement and bronchocentric groundglass and nodular opacities in  the right lower lobe favored to be infectious.  4. Near complete resolution of right-sided pleural effusion with  resolution of intralobular septal thickening in the right lower lobe.  CT chest 9/13/2020  IMPRESSION:  1. Increased size of the right hilar mass/consolidation with  associated narrowing of the subsegmental right upper lobe bronchioles  and multiple scattered pulmonary nodules as above.  Differential  continues to include infection versus malignancy with postobstructive  pneumonia.  2. Increased bibasilar groundglass and right lung tree-in-bud nodular  opacities suggestive of infection, possibly secondary to aspiration.  Small right pleural effusion.  3. Surgical changes of cardiac transplantation.  4. Cholelithiasis without cholecystitis.  CT chest 9/2/2020   IMPRESSION:   1. New mass vs consolidation in the right upper lobe and right hilum  since 7/29/2020 with narrowing of multiple right upper lobe bronchi.   1a. Differential pneumonia with reactive right hilar lymphadenopathy  vs. malignancy(ex. Lymphoma/PTLD) with post obstructive pneumonia.  2. Stable additional pulmonary lesions as above.  3. Stable postsurgical changes of heart transplant.  4. Cholelithiasis without evidence of cholecystitis.

## 2020-12-07 ENCOUNTER — TELEPHONE (OUTPATIENT)
Dept: PHARMACY | Facility: CLINIC | Age: 60
End: 2020-12-07

## 2020-12-11 ENCOUNTER — TELEPHONE (OUTPATIENT)
Dept: EDUCATION SERVICES | Facility: CLINIC | Age: 60
End: 2020-12-11

## 2020-12-16 DIAGNOSIS — Z94.1 STATUS POST HEART TRANSPLANTATION (H): Primary | ICD-10-CM

## 2020-12-16 DIAGNOSIS — Z11.59 ENCOUNTER FOR SCREENING FOR OTHER VIRAL DISEASES: Primary | ICD-10-CM

## 2020-12-22 ENCOUNTER — PRE VISIT (OUTPATIENT)
Dept: UROLOGY | Facility: CLINIC | Age: 60
End: 2020-12-22

## 2020-12-22 NOTE — TELEPHONE ENCOUNTER
Visit Type : PSA check    Records/Orders: PSA    Pt Contacted: called patient and he said he going to get it done on 1/5    At Rooming: video

## 2020-12-29 ENCOUNTER — PRE VISIT (OUTPATIENT)
Dept: TRANSPLANT | Facility: CLINIC | Age: 60
End: 2020-12-29

## 2020-12-29 DIAGNOSIS — Z94.1 STATUS POST HEART TRANSPLANTATION (H): Primary | ICD-10-CM

## 2020-12-29 RX ORDER — LIDOCAINE 40 MG/G
CREAM TOPICAL
Status: CANCELLED | OUTPATIENT
Start: 2020-12-29

## 2020-12-30 ENCOUNTER — TELEPHONE (OUTPATIENT)
Dept: TRANSPLANT | Facility: CLINIC | Age: 60
End: 2020-12-30

## 2020-12-30 NOTE — TELEPHONE ENCOUNTER
Patient Call: General  Route to LPN    Reason for call: pt is looking for a return call from coordinator     Call back needed? Yes    Return Call Needed  Same as documented in contacts section  When to return call?: Greater than one day: Route standard priority

## 2020-12-30 NOTE — TELEPHONE ENCOUNTER
Pt called to relay that he is switching ALL his meds to [facility] in [place]. Delivery has been difficult to depend on.     Called [facility] - they will call pt, confirm which pharmacy and transfer pt profile.

## 2021-01-02 DIAGNOSIS — Z11.59 ENCOUNTER FOR SCREENING FOR OTHER VIRAL DISEASES: ICD-10-CM

## 2021-01-02 PROCEDURE — U0003 INFECTIOUS AGENT DETECTION BY NUCLEIC ACID (DNA OR RNA); SEVERE ACUTE RESPIRATORY SYNDROME CORONAVIRUS 2 (SARS-COV-2) (CORONAVIRUS DISEASE [COVID-19]), AMPLIFIED PROBE TECHNIQUE, MAKING USE OF HIGH THROUGHPUT TECHNOLOGIES AS DESCRIBED BY CMS-2020-01-R: HCPCS | Performed by: INTERNAL MEDICINE

## 2021-01-02 PROCEDURE — U0005 INFEC AGEN DETEC AMPLI PROBE: HCPCS | Performed by: INTERNAL MEDICINE

## 2021-01-03 LAB
LABORATORY COMMENT REPORT: NORMAL
SARS-COV-2 RNA SPEC QL NAA+PROBE: NEGATIVE
SARS-COV-2 RNA SPEC QL NAA+PROBE: NORMAL
SPECIMEN SOURCE: NORMAL
SPECIMEN SOURCE: NORMAL

## 2021-01-04 ENCOUNTER — TELEPHONE (OUTPATIENT)
Dept: CARDIOLOGY | Facility: CLINIC | Age: 61
End: 2021-01-04

## 2021-01-04 NOTE — TELEPHONE ENCOUNTER
Call complete for pre procedure reminder, travel screen and updated visitor policy.  COVID Negative

## 2021-01-05 ENCOUNTER — APPOINTMENT (OUTPATIENT)
Dept: LAB | Facility: CLINIC | Age: 61
End: 2021-01-05
Payer: COMMERCIAL

## 2021-01-05 ENCOUNTER — HOSPITAL ENCOUNTER (OUTPATIENT)
Dept: CARDIOLOGY | Facility: CLINIC | Age: 61
End: 2021-01-05
Attending: INTERNAL MEDICINE
Payer: COMMERCIAL

## 2021-01-05 ENCOUNTER — HOSPITAL ENCOUNTER (OUTPATIENT)
Facility: CLINIC | Age: 61
Discharge: HOME | End: 2021-01-05
Attending: INTERNAL MEDICINE | Admitting: INTERNAL MEDICINE
Payer: COMMERCIAL

## 2021-01-05 ENCOUNTER — APPOINTMENT (OUTPATIENT)
Dept: MEDSURG UNIT | Facility: CLINIC | Age: 61
End: 2021-01-05
Payer: COMMERCIAL

## 2021-01-05 ENCOUNTER — HOSPITAL ENCOUNTER (OUTPATIENT)
Dept: CT IMAGING | Facility: CLINIC | Age: 61
End: 2021-01-05
Attending: INTERNAL MEDICINE
Payer: COMMERCIAL

## 2021-01-05 VITALS
HEIGHT: 64 IN | BODY MASS INDEX: 33.12 KG/M2 | WEIGHT: 194 LBS | HEART RATE: 124 BPM | OXYGEN SATURATION: 98 % | RESPIRATION RATE: 18 BRPM | TEMPERATURE: 97.7 F | DIASTOLIC BLOOD PRESSURE: 101 MMHG | SYSTOLIC BLOOD PRESSURE: 139 MMHG

## 2021-01-05 VITALS
HEIGHT: 64 IN | OXYGEN SATURATION: 98 % | SYSTOLIC BLOOD PRESSURE: 138 MMHG | BODY MASS INDEX: 33.46 KG/M2 | WEIGHT: 196 LBS | HEART RATE: 113 BPM | DIASTOLIC BLOOD PRESSURE: 100 MMHG

## 2021-01-05 DIAGNOSIS — R91.8 MASS OF UPPER LOBE OF RIGHT LUNG: ICD-10-CM

## 2021-01-05 DIAGNOSIS — Z94.1 HEART REPLACED BY TRANSPLANT (H): ICD-10-CM

## 2021-01-05 DIAGNOSIS — Z94.1 STATUS POST HEART TRANSPLANTATION (H): ICD-10-CM

## 2021-01-05 LAB
ALBUMIN SERPL-MCNC: 3.8 G/DL (ref 3.4–5)
ALP SERPL-CCNC: 85 U/L (ref 40–150)
ALT SERPL W P-5'-P-CCNC: 19 U/L (ref 0–70)
ANION GAP SERPL CALCULATED.3IONS-SCNC: 5 MMOL/L (ref 3–14)
AST SERPL W P-5'-P-CCNC: 12 U/L (ref 0–45)
BILIRUB SERPL-MCNC: 0.6 MG/DL (ref 0.2–1.3)
BUN SERPL-MCNC: 48 MG/DL (ref 7–30)
CALCIUM SERPL-MCNC: 9.4 MG/DL (ref 8.5–10.1)
CHLORIDE SERPL-SCNC: 108 MMOL/L (ref 94–109)
CO2 SERPL-SCNC: 27 MMOL/L (ref 20–32)
CREAT SERPL-MCNC: 2.01 MG/DL (ref 0.66–1.25)
ERYTHROCYTE [DISTWIDTH] IN BLOOD BY AUTOMATED COUNT: 12.9 % (ref 10–15)
GFR SERPL CREATININE-BSD FRML MDRD: 35 ML/MIN/{1.73_M2}
GLUCOSE SERPL-MCNC: 171 MG/DL (ref 70–99)
HCT VFR BLD AUTO: 34.2 % (ref 40–53)
HGB BLD-MCNC: 11.1 G/DL (ref 13.3–17.7)
MAGNESIUM SERPL-MCNC: 1.7 MG/DL (ref 1.6–2.3)
MCH RBC QN AUTO: 28.1 PG (ref 26.5–33)
MCHC RBC AUTO-ENTMCNC: 32.5 G/DL (ref 31.5–36.5)
MCV RBC AUTO: 87 FL (ref 78–100)
PHOSPHATE SERPL-MCNC: 3 MG/DL (ref 2.5–4.5)
PLATELET # BLD AUTO: 277 10E9/L (ref 150–450)
POTASSIUM SERPL-SCNC: 4.8 MMOL/L (ref 3.4–5.3)
PROT SERPL-MCNC: 7.5 G/DL (ref 6.8–8.8)
RBC # BLD AUTO: 3.95 10E12/L (ref 4.4–5.9)
SODIUM SERPL-SCNC: 140 MMOL/L (ref 133–144)
TACROLIMUS BLD-MCNC: 5 UG/L (ref 5–15)
TME LAST DOSE: NORMAL H
WBC # BLD AUTO: 6.2 10E9/L (ref 4–11)

## 2021-01-05 PROCEDURE — 88307 TISSUE EXAM BY PATHOLOGIST: CPT | Mod: 26 | Performed by: PATHOLOGY

## 2021-01-05 PROCEDURE — 71250 CT THORAX DX C-: CPT | Mod: 26 | Performed by: RADIOLOGY

## 2021-01-05 PROCEDURE — 93505 ENDOMYOCARDIAL BIOPSY: CPT | Performed by: INTERNAL MEDICINE

## 2021-01-05 PROCEDURE — 88307 TISSUE EXAM BY PATHOLOGIST: CPT | Mod: TC | Performed by: INTERNAL MEDICINE

## 2021-01-05 PROCEDURE — C1894 INTRO/SHEATH, NON-LASER: HCPCS | Performed by: INTERNAL MEDICINE

## 2021-01-05 PROCEDURE — 88346 IMFLUOR 1ST 1ANTB STAIN PX: CPT | Mod: 26 | Performed by: PATHOLOGY

## 2021-01-05 PROCEDURE — 93306 TTE W/DOPPLER COMPLETE: CPT

## 2021-01-05 PROCEDURE — 88350 IMFLUOR EA ADDL 1ANTB STN PX: CPT | Mod: 26 | Performed by: PATHOLOGY

## 2021-01-05 PROCEDURE — 71250 CT THORAX DX C-: CPT

## 2021-01-05 PROCEDURE — 999N000132 HC STATISTIC PP CARE STAGE 1

## 2021-01-05 PROCEDURE — 36415 COLL VENOUS BLD VENIPUNCTURE: CPT | Performed by: NURSE PRACTITIONER

## 2021-01-05 PROCEDURE — 88350 IMFLUOR EA ADDL 1ANTB STN PX: CPT | Mod: TC | Performed by: INTERNAL MEDICINE

## 2021-01-05 PROCEDURE — 88346 IMFLUOR 1ST 1ANTB STAIN PX: CPT | Mod: TC | Performed by: INTERNAL MEDICINE

## 2021-01-05 PROCEDURE — 84100 ASSAY OF PHOSPHORUS: CPT | Performed by: NURSE PRACTITIONER

## 2021-01-05 PROCEDURE — 272N000001 HC OR GENERAL SUPPLY STERILE: Performed by: INTERNAL MEDICINE

## 2021-01-05 PROCEDURE — 99215 OFFICE O/P EST HI 40 MIN: CPT | Performed by: NURSE PRACTITIONER

## 2021-01-05 PROCEDURE — 80197 ASSAY OF TACROLIMUS: CPT | Performed by: NURSE PRACTITIONER

## 2021-01-05 PROCEDURE — 250N000009 HC RX 250: Performed by: INTERNAL MEDICINE

## 2021-01-05 PROCEDURE — 93505 ENDOMYOCARDIAL BIOPSY: CPT | Mod: 26 | Performed by: INTERNAL MEDICINE

## 2021-01-05 PROCEDURE — G0463 HOSPITAL OUTPT CLINIC VISIT: HCPCS

## 2021-01-05 PROCEDURE — 83735 ASSAY OF MAGNESIUM: CPT | Performed by: NURSE PRACTITIONER

## 2021-01-05 PROCEDURE — 80053 COMPREHEN METABOLIC PANEL: CPT | Performed by: NURSE PRACTITIONER

## 2021-01-05 PROCEDURE — 93306 TTE W/DOPPLER COMPLETE: CPT | Mod: 26 | Performed by: STUDENT IN AN ORGANIZED HEALTH CARE EDUCATION/TRAINING PROGRAM

## 2021-01-05 PROCEDURE — 85027 COMPLETE CBC AUTOMATED: CPT | Performed by: NURSE PRACTITIONER

## 2021-01-05 PROCEDURE — 250N000009 HC RX 250: Performed by: NURSE PRACTITIONER

## 2021-01-05 RX ORDER — TACROLIMUS 1 MG/1
CAPSULE ORAL
Qty: 240 CAPSULE | Refills: 3 | Status: SHIPPED | OUTPATIENT
Start: 2021-01-05 | End: 2021-05-19

## 2021-01-05 RX ORDER — MYCOPHENOLATE MOFETIL 250 MG/1
250 CAPSULE ORAL 2 TIMES DAILY
Qty: 120 CAPSULE | Refills: 3 | Status: SHIPPED | OUTPATIENT
Start: 2021-01-05 | End: 2021-01-05

## 2021-01-05 RX ORDER — LIDOCAINE 40 MG/G
CREAM TOPICAL
Status: COMPLETED | OUTPATIENT
Start: 2021-01-05 | End: 2021-01-05

## 2021-01-05 RX ORDER — MYCOPHENOLATE MOFETIL 250 MG/1
750 CAPSULE ORAL 2 TIMES DAILY
Qty: 180 CAPSULE | Refills: 3 | Status: SHIPPED | OUTPATIENT
Start: 2021-01-05 | End: 2021-03-03

## 2021-01-05 RX ADMIN — LIDOCAINE: 40 CREAM TOPICAL at 09:08

## 2021-01-05 ASSESSMENT — MIFFLIN-ST. JEOR
SCORE: 1610.98
SCORE: 1620.05

## 2021-01-05 ASSESSMENT — PAIN SCALES - GENERAL: PAINLEVEL: NO PAIN (0)

## 2021-01-05 NOTE — LETTER
"1/5/2021      RE: Mariusz Sharp  [address] [facility] [address]       Dear Colleague,    Thank you for the opportunity to participate in the care of your patient, Mariusz Sharp, at the [facility]. Please see a copy of my visit note below.    ADULT HE[facility] TRANSPLANT CLINIC  January 5, 2021    HPI:    \"Red\" Aron is a 60yr old male with a history of CAD (STEMI with ALYSSA to LAD 6/27/18), ICM (LVEF 20-25%) s/p HM3 LVAD (12/31/18), monomorphic VT (s/p ICD with shocks), LV thrombus, CKD, elevated PSA, pAF, HTN, HL, and DMII, now s/p OHT 5/18/20, who presents to clinic for routine follow up.    His post-transplant course was c/b NSVT (with no hemodynamic compromise), leukocytosis with C Acnes growing from his former LVAD site (thought to be a contaminant, but treated with IV vanco --> po doxy through 6/1/20, 14d course total), and in the setting of donor blood growing S anginosus and Veillonella (also thought to be a contaminant, but treated with Vanco/zosyn --> Vanco/Flagyl for a total of 7 days).  He also had hyperkalemia, which improved following kayexalate and stopping bactrim.  He ultimately discharged 5/29.    He was readmitted 8/31-9/24 with fever, cough, diarrhea, and syncope.  He was found to have a post-obstructive vs aspiration pneumonia, RUL/suprahilar mass, and narrowing of multiple RUL bronchi.  He was initially treated as a fungal infection as his fungitelle was +, but he did not clinically improve, nor did the size of the mass change.  Ultimately, tissue culture from the RUL mass showed abiotrophia defectiva (oral microbe), which was thought to be a contaminant, but did respond to antibiotics.  His MMF was decreased to 500mg twice daily, as ImmuKnow was noted to be low.    His biopsies have remained negative for rejection, and last AlloMap score from 10/2020 was 34.  He has no DSAs.  Baseline coronary angiogram has been " deferred.  Last TTE 8/2020 showed stable graft function, with LVEF > 70% and normal RV size/function.  Last RHC 10/2020 showed normal biventricular filling pressures, with RA 5, mPA 20, PCW 15, and CI 2.9.      Since his last visit, he states that he feels ok overall.  He has not been extremely active, but does not improvement in his endurance.  His breathing has been good, better since his hospital discharge.  He is still SOB when climbing stairs, but notes that this is improving overall.  He still has a cough, which is also improving.  He has had issues with sleep since transplant, but notes that he is able to lie flat without PND and orthopnea.  His appetite has been good, and he denies nausea, vomiting, diarrhea, and constipation.  His weight is up ~10#, now ranging ~194#, and he feels fluid in his legs.  He notes indentations from his socks in the evenings, and states that it resolves by morning.  His BPs have been 130-140/80s.  His BGs have been up slightly, ranging 180-200s.  He notes that his insurance stopped covering novolog and his diabetes team is working on alternatives.  He otherwise denies chest pain, palpitations, dizziness, falls, headaches, acute vision changes, fevers, sore throat, and signs of bleeding.      Serostatus:  CMV D+/R-  EBV D-/R+  Toxo D-/R-    Patient Active Problem List    Diagnosis Date Noted     Acute kidney failure, unspecified (H) 09/18/2020     Priority: Medium     Fever 08/31/2020     Priority: Medium     Lung mass 08/31/2020     Priority: Medium     Added automatically from request for surgery [id number]       Kidney stone 08/28/2020     Priority: Medium     Rhinovirus infection 08/20/2020     Priority: Medium     Chronic prostatitis without hematuria 08/19/2020     Priority: Medium     Prophylactic antibiotic 08/19/2020     Priority: Medium     Pneumonia 08/18/2020     Priority: Medium     Heart replaced by transplant (H) 05/26/2020     Priority: Medium     Added automatically  from request for surgery [id number]       Biventricular heart failure (H) 05/17/2020     Priority: Medium     Added automatically from request for surgery [id number]       Status post heart transplantation (H) 05/17/2020     Priority: Medium     Added automatically from request for surgery [id number]       Hydronephrosis 05/12/2020     Priority: Medium     Chronic systolic congestive heart failure (H) 02/13/2019     Priority: Medium     Added automatically from request for surgery [id number]       Weakness 01/11/2019     Priority: Medium     Type 2 diabetes mellitus with hyperglycemia, with long-term current use of insulin (H) 07/20/2018     Priority: Medium     Left ventricular apical thrombus following MI (H) 07/06/2018     Priority: Medium     Hematuria 07/04/2018     Priority: Medium     Long term current use of anticoagulant 07/03/2018     Priority: Medium     Hypotension, unspecified hypotension type 07/02/2018     Priority: Medium     Monomorphic ventricular tachycardia (H) 07/02/2018     Priority: Medium     Overview:   Added automatically from request for surgery [id number]       ASCVD (arteriosclerotic cardiovascular disease) 06/27/2018     Priority: Medium     CKD (chronic kidney disease) stage 3, GFR 30-59 ml/min 06/27/2018     Priority: Medium     Dyslipidemia 06/27/2018     Priority: Medium     ST elevation myocardial infarction involving left anterior descending (LAD) coronary artery (H) 06/27/2018     Priority: Medium     Type 2 diabetes mellitus with hyperglycemia (H) 06/27/2018     Priority: Medium     ANDREW (acute kidney injury) (H) 10/06/2016     Priority: Medium     Ureteral obstruction 10/06/2016     Priority: Medium     Unilateral inguinal hernia 09/09/2009     Priority: Medium     Overview:   IMO Update 10/11         PAST MEDICAL HISTORY:  Past Medical History:   Diagnosis Date     Acute kidney injury (H)      CKD (chronic kidney disease)      Coronary artery disease      Diabetes mellitus (H)      HTN  (hypertension)      Hyperlipidemia      ICD (implantable cardioverter-defibrillator), single chamber- NOT dependent 2018     Ischemic cardiomyopathy      LV (left ventricular) mural thrombus      Paroxysmal atrial flutter (H)      RVF (right ventricular failure) (H)      Systolic heart failure (H)      Ventricular tachycardia (H)        CURRENT MEDICATIONS:  Prescription Medications as of 2021       Rx Number Disp Refills Start End Last Dispensed Date Next Fill Date Owning Pharmacy    ACCU-CHEK [place] PLUS test strip  300 strip 0 2020    [phone]    Sig: Check BG 3 times daily at meals and at bedtime.    Class: E-Prescribe    acetaminophen (TYLENOL) 325 MG tablet     2020        Sig: Take 2 tablets (650 mg) by mouth every 4 hours as needed for other (multimodal surgical pain management along with NSAIDS and opioid medication as indicated based on pain control and physical function.)    Class: OTC    Route: Oral    amLODIPine (NORVASC) 5 MG tablet  90 tablet 3 2020    [facility] [place] [address] [address]    Sig: Take 1 tablet (5 mg) by mouth daily    Class: E-Prescribe    Route: Oral    BD SILVANA U/F 32G X 4 MM insulin pen needle  600 each 3 10/9/2020    [facility]/[place] [address] [address]    Sig: Use 6 times daily.    Class: E-Prescribe    calcium carbonate 600 mg-vitamin D 400 units (CALTRATE) 600-400 MG-UNIT per tablet     2020        Sig: Take 1 tablet by mouth 2 times daily (with meals)    Class: OTC    Route: Oral    Renewals     Renewal requests to authorizing provider (Damari Ohara, CARMEN MARKHAM) <b>prohibited</b>          Continuous Blood Gluc  (DEXCOM G6 ) DON  1 Device 1 2020    [facility]/[place] [address] [address]    Si each daily    Class: E-Prescribe    Route: Does not apply     Prior authorization: Closed - Prior Authorization not required for patient/medication    Continuous Blood Gluc Sensor (DEXCOM G6 SENSOR) MISC  9 each 3 2020    [facility]/[place] [address] [address]    Si each every 10 days    Class: E-Prescribe    Route: Does not apply    Prior authorization: Closed - Prior Authorization not required for patient/medication    Continuous Blood Gluc Transmit (DEXCOM G6 TRANSMITTER) MISC  1 each 3 2020    [facility]/[place] [address] [address]    Si each every 3 months    Class: E-Prescribe    Route: Does not apply    Prior authorization: Closed - Prior Authorization not required for patient/medication    hydrALAZINE (APRESOLINE) 50 MG tablet  540 tablet 3 10/13/2020    [facility]/[place] [address] [address]    Sig: Take 2 tablets (100 mg) by mouth 3 times daily    Class: E-Prescribe    Route: Oral    insulin aspart (NOVOLOG PEN) 100 UNIT/ML pen  3 mL 0 10/27/2020    [facility]/[place] [address] [address]    Sig: Inject 1-14 units per custom scale  For Pre-Meal BG less than 140, no additional insulin needed   to 159 give 1 units.    to 179 give 2 units.    to 199 give 3 units.    to 219 give 4 units.    to 239 give 5 units.    to 259 give 6 units.    to 279 give 7 units.    to 299 give 8 units.    to 319 give 9 units.    to 339 give 10 units.    to 359 give 11 units.    to 379 give 12 units.   to 399 give 13 units.  BG >/=400 give 14 units.    Class: Local Print    insulin aspart (NOVOLOG PEN) 100 UNIT/ML pen  3 mL 0 2020    [place], [place] - [address] [place] [address] [address]    Sig: Inject 1-11 Units Subcutaneous At Bedtime Inject 1-11 units at bedtime per custom scale   For Bedtime BG less than 200, no additional insulin needed   to  219 give 1 units.    to 239 give 2 units.    to 259 give 3 units.    to 279 give 4 units.    to 299 give 5 units.    to 319 give 6 units.    to 339 give 7 units.    to 359 give 8 units    to 379 give 9 units.   to 399 give 10 units.  BG >/=400 give 11 units.    Class: Historical    Route: Subcutaneous    insulin glargine (LANTUS PEN) 100 UNIT/ML pen  15 mL 3 9/24/2020    [facility] [address]    Sig: Inject 24 Units Subcutaneous At Bedtime    Class: E-Prescribe    Notes to Pharmacy: If Lantus is not covered by insurance, may substitute Basaglar at same dose and frequency.      Route: Subcutaneous    loperamide (IMODIUM) 2 MG capsule  60 capsule 0 9/24/2020    [facility] [address]    Sig: Take 1 capsule (2 mg) by mouth 4 times daily as needed for diarrhea    Class: E-Prescribe    Route: Oral    magnesium oxide (MAG-OX) 400 (241.3 Mg) MG tablet  90 tablet 3 7/15/2020    [facility] [address]    Sig: Take 1 tablet (400 mg) by mouth 2 times daily    Class: E-Prescribe    Route: Oral    multivitamin w/minerals (THERA-VIT-M) tablet  90 tablet 3 9/24/2020    [facility] [address]    Sig: Take 1 tablet by mouth daily    Class: E-Prescribe    Route: Oral    mycophenolate (GENERIC EQUIVALENT) 500 MG tablet  180 tablet 3 11/10/2020    [facility] [address]    Sig: Take 1 tablet (500 mg) by mouth 2 times daily    Class: E-Prescribe    Notes to Pharmacy: TXP DT 5/18/2020 (Heart) TXP Dischg DT 5/29/2020 DX Heart transplant Z94.1 [facility] ([place], [place])    Route: Oral    NOVOLOG FLEXPEN 100 UNIT/ML soln  15 mL 3 11/27/2020    [facility] [place] [address] [address]      "Sig: INJECT 1 TO 14 UNITS UNDER THE SKIN  WITH MEALS AND 1 TO 11 UNITS AT BEDTIME PER SLIDING SCALES, AND INJECT 1 UNIT UNDER THE SKIN PER 5 GRAMS OF CARBOHYDRATES WITH MEALS AND SNACKS    Class: E-Prescribe    pantoprazole (PROTONIX) 40 MG EC tablet  90 tablet 3 9/28/2020    [facility] [place] [address] [address]     Sig: Take 1 tablet (40 mg) by mouth every morning (before breakfast)    Class: E-Prescribe    Route: Oral    rosuvastatin (CRESTOR) 10 MG tablet  90 tablet 3 7/27/2020    [facility] [place] [address] [address]     Sig: TAKE ONE TABLET BY MOUTH ONCE DAILY    Class: E-Prescribe    tacrolimus (GENERIC EQUIVALENT) 1 MG capsule  180 capsule 11 9/30/2020    [facility] [place] [address] [address] [facility]    Sig: Take 4mg (4 caps) in the AM and 3mg (3 caps) in the PM.    Class: E-Prescribe    Notes to Pharmacy: TXP DT 5/18/2020 (Heart) TXP Dischg DT 5/29/2020 DX Heart transplant Z94.1 [facility] ([place], [place])      [facility] Medications as of 1/5/2021       Dose Frequency Start End    lidocaine (LMX4) cream (Completed)  ONCE PRN 1/5/2021 1/5/2021    Admin Instructions: Apply to incision site for local anesthesia.    Class: E-Prescribe    Route: Topical          ROS:   Constitutional: No fever, chills, or sweats. Weight gain.   ENT: No visual disturbance, ear ache, epistaxis, sore throat.   Allergies/Immunologic: Negative.   Respiratory: As per HPI.  Cardiovascular: As per HPI.   GI: No nausea, vomiting, hematemesis, melena, or hematochezia.   : No urinary frequency, dysuria, or hematuria.   Integument: Negative.   Psychiatric: Negative.   Neuro: Negative.   Endocrinology: As per HPI.  Musculoskeletal: As per HPI.    Exam:  BP (!) 138/100 (BP Location: Right arm, Patient Position: Chair, Cuff Size: Adult Regular)   Pulse 113   Ht 1.626 m (5' 4\")   Wt 88.9 kg (196 lb)   SpO2 98%   BMI " 33.64 kg/m    In general, the patient is a pleasant male in no apparent distress.    HEENT: NC/AT. PERRLA. EOMI.  Sclerae white, not injected.    Neck:  No adenopathy, No thyromegaly.    COR: No jugular venous distention when sitting upright.  RRR.  Normal S1 S2 splits physiologically.  No murmur, rub click, or gallop.    Lungs:  CTA. No rhonchi.    Abdomen: soft, nontender, nondistended.  No organomegaly.  Extremities:  No clubbing or cyanosis, trace bilateral LE edema at ankles  Neuro: Alert & Oriented x 3, grossly non focal.  Integument: no open lesions, rashes, or jaundice.    Labs:  CBC RESULTS:   Lab Results   Component Value Date    WBC 6.2 01/05/2021    RBC 3.95 (L) 01/05/2021    HGB 11.1 (L) 01/05/2021    HCT 34.2 (L) 01/05/2021    MCV 87 01/05/2021    MCH 28.1 01/05/2021    MCHC 32.5 01/05/2021    RDW 12.9 01/05/2021     01/05/2021       BMP RESULTS:  Lab Results   Component Value Date     01/05/2021    POTASSIUM 4.8 01/05/2021    CHLORIDE 108 01/05/2021    CO2 27 01/05/2021    ANIONGAP 5 01/05/2021     (H) 01/05/2021    BUN 48 (H) 01/05/2021    CR 2.01 (H) 01/05/2021    GFRESTIMATED 35 (L) 01/05/2021    GFRESTBLACK 41 (L) 01/05/2021    ARLENE 9.4 01/05/2021      LIPID RESULTS:  Lab Results   Component Value Date    CHOL 161 10/29/2020    HDL 51 10/29/2020    LDL 81 10/29/2020    TRIG 144 10/29/2020    NHDL 110 10/29/2020       IMMUNOSUPPRESSANT LEVELS  Lab Results   Component Value Date    TACROL 5.0 01/05/2021    DOSTAC Not Provided 01/05/2021       No components found for: CK  Lab Results   Component Value Date    MAG 1.7 01/05/2021     Lab Results   Component Value Date    A1C 6.2 (H) 10/29/2020     Lab Results   Component Value Date    PHOS 3.0 01/05/2021     Lab Results   Component Value Date    NTBNPI 1,673 (H) 08/18/2020     Lab Results   Component Value Date    SAITESTMET SA FCS 10/29/2020    SAERILL Class I 10/29/2020    [id number] Cw:15 10/29/2020    [id number] Cw:18 10/29/2020  "   SAIREPCOM  10/29/2020      Test performed by modified procedure. Serum heat inactivated and tested   by a modified ([place]) protocol including fetal calf serum addition.   High-risk, mfi >3,000. Mod-risk, mfi 500-3,000.       Lab Results   Component Value Date    SAIITESTME SA FCS 10/29/2020    SAIICELL Class II 10/29/2020    QL1TZBKRN None 10/29/2020    [id number] DR:10 10/29/2020    SAIIREPCOM  10/29/2020      Test performed by modified procedure. Serum heat inactivated and tested   by a modified ([place]) protocol including fetal calf serum addition.   High-risk, mfi >3,000. Mod-risk, mfi 500-3,000.       Lab Results   Component Value Date    CSPEC Plasma 10/29/2020       Assessment and Plan:  Mr. Wayne Sharp (Pepe\) is a 60yr old male with a history of CAD (STEMI with ALYSSA to LAD 6/27/18), ICM (LVEF 20-25%) s/p HM3 LVAD (12/31/18), monomorphic VT (s/p ICD with shocks), LV thrombus, CKD, elevated PSA, pAF, HTN, HL, and DMII, now s/p OHT 5/18/20, who presents to clinic for routine follow up.    ICM, s/p OHT 5/18/20  His post-transplant course was c/b NSVT (with no hemodynamic compromise), leukocytosis with C Acnes growing from his former LVAD site (thought to be a contaminant, but treated with IV vanco --> po doxy through 6/1/20, 14d course total), and in the setting of donor blood growing S anginosus and Veillonella (also thought to be a contaminant, but treated with Vanco/zosyn --> Vanco/Flagyl for a total of 7 days).  He also had hyperkalemia, which improved following kayexalate and stopping bactrim.  He ultimately discharged 5/29.    His biopsies have remained negative for rejection, and last AlloMap score from 10/2020 was 34.  He has no DSAs.  Baseline coronary angiogram has been deferred.  Last TTE 8/2020 showed stable graft function, with LVEF > 70% and normal RV size/function.  Last RHC 10/2020 showed normal biventricular filling pressures, with RA 5, mPA 20, PCW 15, and CI 2.9.      Labs today show stable " electrolytes.  Creatinine 2.01.  LFTs normal.  CBC shows stable blood counts.  Tacro level 5.0.    RHC today showed normal biventricular filling pressures, with RA 4, mPA 14, PCW 11, and CI 2.91.    TTE today showed stable graft function, with LVEF > 65% and normal RV size/function.    AlloMap in process.  Biopsy results are in process.    Mr. Sharp appears well today.  His BPs remain stable.  His weight is up, but he appears euvolemic on exam and per RHC from today.  Advised that he try to get regular aerobic activity, and continue heart-healthy dieting.    As his WBCs have improved, advised that he increase MMF to 750mg twice daily.  As his tacro level was low, advised that he increase tac to 4mg twice daily.  Will plan to repeat a BMP, CBC, and tacro level in 1 week.      Serostatus:  - CMV D+/R-  - EBV D-/R+  - Toxo D-/R-    Immunosuppression:  - increase MMF to 750mg BID  - tacro, goal level 6-8.  Level today was 5, so will increase tac to 4mg BID.    PPx:  - CAV:  Need to clarify if he is on aspirin.  Continue rosuvastatin 10mg daily.  - GI:  Pantoprazole 40mg daily  - Osteoporosis:  Calcium/vitamin d supplements    Graft function:  - BPs:  Controlled, continue amlodipine 5mg daily and hydralazine 100mg TID  - fluid status:  Euvolemic, not on diuretics    Health maintenance:  - needs shingrex, needs to determine if insurance will cover  - needs pneumonia shot  - unsure received flu shot?  Maybe 10/22 per [facility]?        Post-obstructive versus aspiration pneumonia  RUL/suprahilar mass   +abiotrophia defectiva  He was readmitted 8/31/20-9/24/20 with fever, cough, diarrhea, and syncope.  He was found to have a post-obstructive vs aspiration pneumonia, RUL/suprahilar mass, and narrowing of multiple RUL bronchi.  He was initially treated as a fungal infection as his fungitelle was +, but he did not clinically improve, nor did the size of the mass change.  Ultimately, tissue culture from the RUL mass showed  abiotrophia defectiva (oral microbe), which was thought to be a contaminant, but did respond to antibiotics.  His MMF was decreased to 500mg twice daily, as ImmuKnow was noted to be low.  He completed 4 weeks of antibiotic therapy 10/11/20, and follow up chest CT from today is in process.  He follows with Transplant ID.    Leukopenia, resolved  Increasing MMF as noted above, and will trend CBC.    DMII  BGs slightly elevated, following with endo.    Elevated PSA  Prostate MRI 5/2020 showed signs of BPH. He has been referred to urology.         The above was reviewed with Mr. Sharp, who verbalized understanding and will call with further questions/concerns.    45 minutes spent with patient, along with preparing for visit, reviewing follow up, and completing documentation.      Karen Benson DNP, FNP-BC, CHFN  Advanced Heart Failure Nurse Practitioner  [facility]  Patient Care Team:  Milad Fry MD as PCP - General (Family Practice)  Antonia Byrne APRN CNP as Nurse Practitioner (Cardiology)  Jenni Ortiz MD as MD (Cardiology)  Megan Ibarra MD as MD (Urology)  Nina Gutierrez, RN as Specialty Care Coordinator (Urology)  Angelika Muller, RN as Transplant Coordinator (Cardiology)  Xavi Mckeon [facility] as Pharmacist (Pharmacist)  Jeannette Schafer PA-C as Physician Assistant (INTERNAL MEDICINE - ENDOCRINOLOGY, DIABETES & METABOLISM)  Tavares Rodriguez MD as Assigned Infectious Disease Provider  Donny Gomes MD as Assigned Cancer Care Provider  Kenton Carlos MD as Assigned Surgical Provider  JENNI ORTIZ      Please do not hesitate to contact me if you have any questions/concerns.     Sincerely,     Karen Benson NP

## 2021-01-05 NOTE — PROGRESS NOTES
Patient arrived on 2A for RHC and biopsy. LMX to right neck.  Awaiting consent, otherwise prep complete.

## 2021-01-05 NOTE — NURSING NOTE
Chief Complaint   Patient presents with     Follow Up     57 year old male with HFrEF with EF 15%, presenting for HF follow-up with labs prior.      Evette Garcia, CMA

## 2021-01-05 NOTE — PROGRESS NOTES
Pt back from CCL s/p RHC and biopsy.  B/P up 149/109.  Pt denies any pain.  Rt neck site F/D/I.  1050-B/P 169/112-Phyllis Urbina, ANNE-MARIE notified-will recheck B/P in 15 minutes.  Discharge instructions went over with and given to pt by previous nurse, Xiomy ORTEGA RN-pt has no further questions.  1108-B/P 139/101-down to pt's baseline.  1110-Pt D/C'ed to gold waiting room for CT scheduled for later today.

## 2021-01-05 NOTE — PATIENT INSTRUCTIONS
We appreciate your assistance in coordinating your healthcare.     Please upload your insulin pump, blood sugar meter and/or continuous glucose monitor at home 1-2 days before your next diabetes-related appointment.   This will allow your provider to review your  data before your scheduled virtual visit.    To ask a question to your Endocrine care team, please send them a Beezik message, or reach them by phone at [phone]     To expedite your medication refill(s), please contact your pharmacy and have them   fax a refill request to: [phone].  *Please allow 3 business days for routine medication refills.  *Please allow 5 business days for controlled substance medication refills.    For after-hours urgent Endocrine issues, that do not require 191, please dial [phone], and ask to speak with the Endocrinologist On-Call

## 2021-01-05 NOTE — PATIENT INSTRUCTIONS
Patient Instructions  1. Try compression socks for legs if swelling becomes bothersome.  2. Work on increasing your fluid intake to help with creatinine levels.  3. Increase Cellcept to 750mg twice a day.   4. Increase your Tacrolimus dose to 4mg twice a day.  5.  Recheck your Tacrolimus level  a CBC and BMP in one week.    Next transplant clinic appointment:in 2 months  Next lab draw: One week for Tacro level and BMP.  Coordinator will call with all pending results.     Please call transplant coordinator with any questions:    Angelika Muller RN BSN   Post Heart Transplant Nurse Coordinator  [facility]  Questions: [phone]

## 2021-01-05 NOTE — IP AVS SNAPSHOT
MRN:[id number]                      After Visit Summary   1/5/2021    Mariusz Sharp    MRN: [id number]           Visit Information        Department      1/5/2021  8:14 AM [facility] [address] [place]          Review of your medicines      UNREVIEWED medicines. Ask your doctor about these medicines       Dose / Directions   acetaminophen 325 MG tablet  Commonly known as: TYLENOL  Used for: Status post heart transplantation (H)      Dose: 650 mg  Take 2 tablets (650 mg) by mouth every 4 hours as needed for other (multimodal surgical pain management along with NSAIDS and opioid medication as indicated based on pain control and physical function.)  Quantity:    Refills: 0     amLODIPine 5 MG tablet  Commonly known as: NORVASC  Used for: Heart replaced by transplant (H)      Dose: 5 mg  Take 1 tablet (5 mg) by mouth daily  Quantity: 90 tablet  Refills: 3     calcium carbonate 600 mg-vitamin D 400 units 600-400 MG-UNIT per tablet  Commonly known as: CALTRATE  Used for: Status post heart transplantation (H)      Dose: 1 tablet  Take 1 tablet by mouth 2 times daily (with meals)  Quantity:    Refills: 0     hydrALAZINE 50 MG tablet  Commonly known as: APRESOLINE  Used for: Heart replaced by transplant (H)      Dose: 100 mg  Take 2 tablets (100 mg) by mouth 3 times daily  Quantity: 540 tablet  Refills: 3     * insulin aspart 100 UNIT/ML pen  Commonly known as: NovoLOG PEN  Used for: Status post heart transplantation (H)      Dose: 1-11 Units  Inject 1-11 Units Subcutaneous At Bedtime Inject 1-11 units at bedtime per custom scale   For Bedtime BG less than 200, no additional insulin needed   to 219 give 1 units.    to 239 give 2 units.    to 259 give 3 units.    to 279 give 4 units.    to 299 give 5 units.    to 319 give 6 units.    to 339 give 7 units.    to 359 give 8 units    to 379 give 9 units.   to 399 give 10 units.  BG >/=400  give 11 units.  Quantity: 3 mL  Refills: 0     * insulin aspart 100 UNIT/ML pen  Commonly known as: NovoLOG PEN  Used for: Status post heart transplantation (H)      Inject 1-14 units per custom scale  For Pre-Meal BG less than 140, no additional insulin needed   to 159 give 1 units.    to 179 give 2 units.    to 199 give 3 units.    to 219 give 4 units.    to 239 give 5 units.    to 259 give 6 units.    to 279 give 7 units.    to 299 give 8 units.    to 319 give 9 units.    to 339 give 10 units.    to 359 give 11 units.    to 379 give 12 units.   to 399 give 13 units.  BG >/=400 give 14 units.  Quantity: 3 mL  Refills: 0     * NovoLOG FLEXPEN 100 UNIT/ML pen  Used for: Status post heart transplantation (H)  Generic drug: insulin aspart      INJECT 1 TO 14 UNITS UNDER THE SKIN  WITH MEALS AND 1 TO 11 UNITS AT BEDTIME PER SLIDING SCALES, AND INJECT 1 UNIT UNDER THE SKIN PER 5 GRAMS OF CARBOHYDRATES WITH MEALS AND SNACKS  Quantity: 15 mL  Refills: 3     insulin glargine 100 UNIT/ML pen  Commonly known as: LANTUS PEN  Used for: Status post heart transplantation (H)      Dose: 24 Units  Inject 24 Units Subcutaneous At Bedtime  Quantity: 15 mL  Refills: 3     loperamide 2 MG capsule  Commonly known as: IMODIUM  Used for: Diarrhea, unspecified type      Dose: 2 mg  Take 1 capsule (2 mg) by mouth 4 times daily as needed for diarrhea  Quantity: 60 capsule  Refills: 0     magnesium oxide 400 (241.3 Mg) MG tablet  Commonly known as: MAG-OX  Used for: Hypomagnesemia      Dose: 400 mg  Take 1 tablet (400 mg) by mouth 2 times daily  Quantity: 90 tablet  Refills: 3     multivitamin w/minerals tablet  Used for: Lung mass, Heart replaced by transplant (H), Diarrhea, unspecified type      Dose: 1 tablet  Take 1 tablet by mouth daily  Quantity: 90 tablet  Refills: 3     mycophenolate 500 MG tablet  Commonly known as: GENERIC EQUIVALENT  Used for: Status  post heart transplantation (H)      Dose: 500 mg  Take 1 tablet (500 mg) by mouth 2 times daily  Quantity: 180 tablet  Refills: 3     pantoprazole 40 MG EC tablet  Commonly known as: PROTONIX  Used for: Heart replaced by transplant (H)      Dose: 40 mg  Take 1 tablet (40 mg) by mouth every morning (before breakfast)  Quantity: 90 tablet  Refills: 3     rosuvastatin 10 MG tablet  Commonly known as: CRESTOR  Used for: Status post heart transplantation (H)      TAKE ONE TABLET BY MOUTH ONCE DAILY  Quantity: 90 tablet  Refills: 3     tacrolimus 1 MG capsule  Commonly known as: GENERIC EQUIVALENT  Used for: Status post heart transplantation (H)      Take 4mg (4 caps) in the AM and 3mg (3 caps) in the PM.  Quantity: 180 capsule  Refills: 11         * This list has 3 medication(s) that are the same as other medications prescribed for you. Read the directions carefully, and ask your doctor or other care provider to review them with you.            CONTINUE these medicines which have NOT CHANGED       Dose / Directions   Accu-Chek Denise Plus test strip  Used for: Status post heart transplantation (H)  Generic drug: blood glucose      Check BG 3 times daily at meals and at bedtime.  Quantity: 300 strip  Refills: 0     BD SILVANA U/F 32G X 4 MM miscellaneous  Used for: Type 2 diabetes mellitus with hyperglycemia, with long-term current use of insulin (H)  Generic drug: insulin pen needle      Use 6 times daily.  Quantity: 600 each  Refills: 3     Dexcom G6  Monie  Used for: Type 2 diabetes mellitus with hyperglycemia, with long-term current use of insulin (H)      Dose: 1 each  1 each daily  Quantity: 1 Device  Refills: 1     Dexcom G6 Sensor Misc  Used for: Type 2 diabetes mellitus with hyperglycemia, with long-term current use of insulin (H)      Dose: 1 each  1 each every 10 days  Quantity: 9 each  Refills: 3     Dexcom G6 Transmitter Misc  Used for: Type 2 diabetes mellitus with hyperglycemia, with long-term current  use of insulin (H)      Dose: 1 each  1 each every 3 months  Quantity: 1 each  Refills: 3              Protect others around you: Learn how to safely use, store and throw away your medicines at www.disposemymeds.org.       Follow-ups after your visit       Your next 10 appointments already scheduled    Jan 05, 2021  9:00 AM  Procedure - 2.5 hour with U2A ROOM 16  [facility] [address] [place] ([facility]) [address] [address]      Jan 05, 2021  Procedure with Khris Mcclelland MD  [facility] ([facility]) [address] [address] [phone]   The [facility] is located on the corner of [address] [facility] and [facility] on the [facility]. It is easily accessible from virtually any point in the [place] [place] area, via [address] and [address].   Jan 05, 2021 11:30 AM  Echo Complete with UUECHR2  [facility] ([facility]) [address] [facility] [address]  [phone]   1. Please bring or wear a comfortable two-piece outfit.  2. You may eat, drink and take your normal medicines.  3. Please do not apply perfumes or lotions on the day of your exam.   4. For any questions that cannot be answered, please contact the ordering physician     Jan 05, 2021 12:40 PM  CT CHEST W/O CONTRAST with UUCT1  [facility] ([facility]) [address] [facility] [address]  [phone]   How do I prepare for my exam? (Food and drink instructions)  No Food and Drink Restrictions.    How do I prepare for my exam? (Other instructions)  You do not need to do anything special to prepare for this  exam.  For a sinus scan: Use your nose spray (nasal decongestant spray) as directed.    What should I wear: Please wear loose clothing, such as a sweat suit or jogging clothes. Avoid snaps, zippers and other metal. We may ask you to undress and put on a hospital gown.    How long does the exam take: Most scans take less than 20 minutes.    What should I bring: Please bring any scans or X-rays taken at other hospitals, if similar tests were done. Also bring a list of your medicines, including vitamins, minerals and over-the-counter drugs. It is safest to leave personal items at home.    Do I need a : No  is needed.    What do I need to tell my doctor?  Be sure to tell your doctor:  * If you have any allergies.  * If there s any chance you are pregnant.  * If you are breastfeeding.    What should I do after the exam: No restrictions, You may resume normal activities.    What is this test: A CT (computed tomography) scan is a series of pictures that allows us to look inside your body. The scanner creates images of the body in cross sections, much like slices of bread. This helps us see any problems more clearly.    Who should I call with questions: If you have any questions, please call the Imaging Department where you will have your exam. Directions, parking instructions, and other information is available on our website, [email or web address].     Jan 05, 2021  2:30 PM  (Arrive by 2:15 PM)  RETURN HEART TRANSPLANT with Karen Benson NP  [facility]) [address] [place] [address] [phone]      Jan 06, 2021  9:30 AM  (Arrive by 9:15 AM)  Video Visit with Jeannette Schafer PA-C  [facility] Endocrinology [facility]) [address] [place] [address]  3rd Floor  [facility] [address] [phone]   [facility] Endocrin[facility]  Note: this is not an onsite  visit; there is no need to come to the facility.  Please have a list of all current medications available for appointment.      Jan 07, 2021 10:00 AM  (Arrive by 9:45 AM)  Video Visit with Donny Gomes MD  [facility] [place] ([facility]) [address] [place] [address]  [address] [facility] [address]  [phone]   [facility]rology [facility]  Note: this is not an onsite visit; there is no need to come to the facility.  Please have a list of all current medications available for appointment.      Jan 12, 2021  6:30 PM  (Arrive by 6:15 PM)  Video Visit with Tavares Rodriguez MD  [facility] [place] ([facility]) [address] [place] [address]  [phone]   [facility] Infectious Disease [facility]  Note: this is not an onsite visit; there is no need to come to the facility.  Please have a list of all current medications available for appointment.         Care Instructions       Further instructions from your care team       [facility]                        Interventional Cardiology  Discharge Instructions   Post Right Heart Cath      AFTER YOU GO HOME:    DO drink plenty of fluids    DO resume your regular diet and medications unless otherwise instructed by your Primary Physician    Do Not scrub the procedure site vigorously    No lotion or powder to the puncture site for 3 days    CALL YOUR PRIMARY PHYSICIAN IF: You may resume all normal activity.  Monitor neck site for bleeding, swelling, or voice changes. If you notice bleeding or swelling immediately apply pressure to the site and call number below to speak with Cardiology Fellow.  If you experience any changes in your breathing you should call your doctor immediately or come to the closest Emergency Department.  Do not drive yourself.    ADDITIONAL INSTRUCTIONS: Medications:  You are to resume all home medications including anticoagulation therapy unless otherwise advised by your primary cardiologist or nurse coordinator.    Follow Up: Per your primary cardiology team    If you have any questions or concerns regarding your procedure site please call [phone] at anytime and ask for Cardiology Fellow on call.  They are available 24 hours a day.  You may also contact the Cardiology Clinic after hours number at [phone].                                                       Telephone Numbers [phone] Monday-Friday 8:00 am to 4:30 pm    [phone] After 4:30 pm Monday-Friday, Weekends & Holidays  Ask for Interventional Cardiologist on call. Someone is on call 24 hours/day   [facility] toll free number [phone] Monday-Friday 8:00 am to 4:30 pm   [facility] Emergency Dept [phone]                   Additional Information About Your Visit       MyChart Information    [employer] gives you secure access to your electronic health record. If you see a primary care provider, you can also send messages to your care team and make appointments. If you have questions, please call your primary care clinic.  If you do not have a primary care provider, please call [phone] and they will assist you.       Care EveryWhere ID    This is your Care EveryWhere ID. This could be used by other organizations to access your [place] medical records  [id number]        Primary Care Provider Office Phone # Fax #    Milad Fry [phone]      Equal Access to Services    GILLIAN COX : Hadii can marcuso Sovicky, waaxda luqadaha, qaybta kaalmada bessie, mynor gibbons. So [facility] [phone].    ATENCIÓN: Si habla español, tiene a waddell disposición servicios gratuitos de asistencia lingüística. Llame al [phone].    We comply with applicable federal and state civil rights laws, including the [place] Human Rights Act. We do not  discriminate on the basis of race, color, creed, Samaritan, national origin, marital status, age, disability, sex, sexual orientation, or gender identity.    If you would like an itemization of your charges they will now be available in [employer] 30 days after discharge. To access the itemized statements in [employer] go to billing/billing summary. From there select view account. There will be multiple tabs showing an overview of your account, detail, payments, and communications. From the communications tab you can see your monthly statements, your itemized statements, and any billing letters generated for your account. If you do not have a [employer] account and need help getting access please contact [employer] support at [phone].  If you would prefer to have your itemized statements mailed please contact our automated itemized bill request line at [phone] option  2.       Thank you!    Thank you for choosing [facility] for your care. Our goal is always to provide you with excellent care. Hearing back from our patients is one way we can continue to improve our services. Please take a few minutes to complete the written survey that you may receive in the mail after you visit with us. Thank you!            Medication List      Medications          Morning Afternoon Evening Bedtime As Needed    Accu-Chek Denise Plus test strip  INSTRUCTIONS: Check BG 3 times daily at meals and at bedtime.  Generic drug: blood glucose                     BD SILVANA U/F 32G X 4 MM miscellaneous  INSTRUCTIONS: Use 6 times daily.  Generic drug: insulin pen needle                     Dexcom G6  Monie  INSTRUCTIONS: 1 each daily                     Dexcom G6 Sensor Misc  INSTRUCTIONS: 1 each every 10 days                     Dexcom G6 Transmitter Misc  INSTRUCTIONS: 1 each every 3 months                       ASK your doctor about these medications          Morning Afternoon Evening Bedtime As Needed    acetaminophen 325 MG tablet  Also  known as: TYLENOL  INSTRUCTIONS: Take 2 tablets (650 mg) by mouth every 4 hours as needed for other (multimodal surgical pain management along with NSAIDS and opioid medication as indicated based on pain control and physical function.)                     amLODIPine 5 MG tablet  Also known as: NORVASC  INSTRUCTIONS: Take 1 tablet (5 mg) by mouth daily                     calcium carbonate 600 mg-vitamin D 400 units 600-400 MG-UNIT per tablet  Also known as: CALTRATE  INSTRUCTIONS: Take 1 tablet by mouth 2 times daily (with meals)                     hydrALAZINE 50 MG tablet  Also known as: APRESOLINE  INSTRUCTIONS: Take 2 tablets (100 mg) by mouth 3 times daily                     * insulin aspart 100 UNIT/ML pen  Also known as: NovoLOG PEN  INSTRUCTIONS: Inject 1-11 Units Subcutaneous At Bedtime Inject 1-11 units at bedtime per custom scale   For Bedtime BG less than 200, no additional insulin needed   to 219 give 1 units.    to 239 give 2 units.    to 259 give 3 units.    to 279 give 4 units.    to 299 give 5 units.    to 319 give 6 units.    to 339 give 7 units.    to 359 give 8 units    to 379 give 9 units.   to 399 give 10 units.  BG >/=400 give 11 units.                     * insulin aspart 100 UNIT/ML pen  Also known as: NovoLOG PEN  INSTRUCTIONS: Inject 1-14 units per custom scale  For Pre-Meal BG less than 140, no additional insulin needed   to 159 give 1 units.    to 179 give 2 units.    to 199 give 3 units.    to 219 give 4 units.    to 239 give 5 units.    to 259 give 6 units.    to 279 give 7 units.    to 299 give 8 units.    to 319 give 9 units.    to 339 give 10 units.    to 359 give 11 units.    to 379 give 12 units.   to 399 give 13 units.  BG >/=400 give 14 units.                     * NovoLOG FLEXPEN 100 UNIT/ML pen  INSTRUCTIONS: INJECT 1 TO 14 UNITS UNDER THE  SKIN  WITH MEALS AND 1 TO 11 UNITS AT BEDTIME PER SLIDING SCALES, AND INJECT 1 UNIT UNDER THE SKIN PER 5 GRAMS OF CARBOHYDRATES WITH MEALS AND SNACKS  Generic drug: insulin aspart                     insulin glargine 100 UNIT/ML pen  Also known as: LANTUS PEN  INSTRUCTIONS: Inject 24 Units Subcutaneous At Bedtime  Doctor's comments: If Lantus is not covered by insurance, may substitute Basaglar at same dose and frequency.                       loperamide 2 MG capsule  Also known as: IMODIUM  INSTRUCTIONS: Take 1 capsule (2 mg) by mouth 4 times daily as needed for diarrhea                     magnesium oxide 400 (241.3 Mg) MG tablet  Also known as: MAG-OX  INSTRUCTIONS: Take 1 tablet (400 mg) by mouth 2 times daily                     multivitamin w/minerals tablet  INSTRUCTIONS: Take 1 tablet by mouth daily                     mycophenolate 500 MG tablet  Also known as: GENERIC EQUIVALENT  INSTRUCTIONS: Take 1 tablet (500 mg) by mouth 2 times daily  Doctor's comments: TXP DT 5/18/2020 (Heart) TXP Dischg DT 5/29/2020 DX Heart transplant Z94.1 [facility] ([place], [place])                     pantoprazole 40 MG EC tablet  Also known as: PROTONIX  INSTRUCTIONS: Take 1 tablet (40 mg) by mouth every morning (before breakfast)                     rosuvastatin 10 MG tablet  Also known as: CRESTOR  INSTRUCTIONS: TAKE ONE TABLET BY MOUTH ONCE DAILY                     tacrolimus 1 MG capsule  Also known as: GENERIC EQUIVALENT  INSTRUCTIONS: Take 4mg (4 caps) in the AM and 3mg (3 caps) in the PM.  Doctor's comments: TXP DT 5/18/2020 (Heart) TXP Dischg DT 5/29/2020 DX Heart transplant Z94.1 [facility] ([place], [place])                        * This list has 3 medication(s) that are the same as other medications prescribed for you. Read the directions carefully, and ask your doctor or other care provider to review them with you.

## 2021-01-05 NOTE — PROGRESS NOTES
[facility]   Interventional Cardiology        Consenting/Education for Right Heart Catheterization/Endomyocardial Biopsy     Patient understands as a part of routine surveillance after heart transplant we will perform a right heart catheterization/endomyocardial biopsy.  This procedure will be performed by Dr. Garcia.    Patient understands during the portion for right heart catheterization a fine tube (catheter) is put into the vein of the groin/neck.  It is carefully passed along until it reaches the heart and then goes up into the blood vessels of the lungs. This is done to measure a variety of pressures in your heart and can tell us how well the heart is filling and emptying, as well as monitor fluid status. While the catheter is in your neck/groin vein, a  Biopsy forceps  or pincers at the end of the catheter are used to take the tissue samples. This is may be repeated to get enough samples. The biopsy pieces are very small (one to two millimeters).     Patient also understands risks and complications of the procedure which include, but are not limited to bruising around the incision site, infection, bleeding, and allergic reaction to local anesthetic.      Patient verbalized understanding of risks and benefits of the right heart catheterization and endomyocardial biopsy and has elected to proceed with the procedure.       Phyllis Urbina, LEX APRN CNP   [facility] Interventional Cardiology

## 2021-01-05 NOTE — PROGRESS NOTES
"ADULT HEART TRANSPLANT CLINIC  January 5, 2021    HPI:    \"Vasyl" Aron is a 60yr old male with a history of CAD (STEMI with ALYSSA to LAD 6/27/18), ICM (LVEF 20-25%) s/p HM3 LVAD (12/31/18), monomorphic VT (s/p ICD with shocks), LV thrombus, CKD, elevated PSA, pAF, HTN, HL, and DMII, now s/p OHT 5/18/20, who presents to clinic for routine follow up.    His post-transplant course was c/b NSVT (with no hemodynamic compromise), leukocytosis with C Acnes growing from his former LVAD site (thought to be a contaminant, but treated with IV vanco --> po doxy through 6/1/20, 14d course total), and in the setting of donor blood growing S anginosus and Veillonella (also thought to be a contaminant, but treated with Vanco/zosyn --> Vanco/Flagyl for a total of 7 days).  He also had hyperkalemia, which improved following kayexalate and stopping bactrim.  He ultimately discharged 5/29.    He was readmitted 8/31-9/24 with fever, cough, diarrhea, and syncope.  He was found to have a post-obstructive vs aspiration pneumonia, RUL/suprahilar mass, and narrowing of multiple RUL bronchi.  He was initially treated as a fungal infection as his fungitelle was +, but he did not clinically improve, nor did the size of the mass change.  Ultimately, tissue culture from the RUL mass showed abiotrophia defectiva (oral microbe), which was thought to be a contaminant, but did respond to antibiotics.  His MMF was decreased to 500mg twice daily, as ImmuKnow was noted to be low.    His biopsies have remained negative for rejection, and last AlloMap score from 10/2020 was 34.  He has no DSAs.  Baseline coronary angiogram has been deferred.  Last TTE 8/2020 showed stable graft function, with LVEF > 70% and normal RV size/function.  Last RHC 10/2020 showed normal biventricular filling pressures, with RA 5, mPA 20, PCW 15, and CI 2.9.      Since his last visit, he states that he feels ok overall.  He has not been extremely active, but does not improvement " in his endurance.  His breathing has been good, better since his hospital discharge.  He is still SOB when climbing stairs, but notes that this is improving overall.  He still has a cough, which is also improving.  He has had issues with sleep since transplant, but notes that he is able to lie flat without PND and orthopnea.  His appetite has been good, and he denies nausea, vomiting, diarrhea, and constipation.  His weight is up ~10#, now ranging ~194#, and he feels fluid in his legs.  He notes indentations from his socks in the evenings, and states that it resolves by morning.  His BPs have been 130-140/80s.  His BGs have been up slightly, ranging 180-200s.  He notes that his insurance stopped covering novolog and his diabetes team is working on alternatives.  He otherwise denies chest pain, palpitations, dizziness, falls, headaches, acute vision changes, fevers, sore throat, and signs of bleeding.      Serostatus:  CMV D+/R-  EBV D-/R+  Toxo D-/R-    Patient Active Problem List    Diagnosis Date Noted     Acute kidney failure, unspecified (H) 09/18/2020     Priority: Medium     Fever 08/31/2020     Priority: Medium     Lung mass 08/31/2020     Priority: Medium     Added automatically from request for surgery [id number]       Kidney stone 08/28/2020     Priority: Medium     Rhinovirus infection 08/20/2020     Priority: Medium     Chronic prostatitis without hematuria 08/19/2020     Priority: Medium     Prophylactic antibiotic 08/19/2020     Priority: Medium     Pneumonia 08/18/2020     Priority: Medium     Heart replaced by transplant (H) 05/26/2020     Priority: Medium     Added automatically from request for surgery [id number]       Biventricular heart failure (H) 05/17/2020     Priority: Medium     Added automatically from request for surgery [id number]       Status post heart transplantation (H) 05/17/2020     Priority: Medium     Added automatically from request for surgery [id number]       Hydronephrosis 05/12/2020      Priority: Medium     Chronic systolic congestive heart failure (H) 02/13/2019     Priority: Medium     Added automatically from request for surgery [id number]       Weakness 01/11/2019     Priority: Medium     Type 2 diabetes mellitus with hyperglycemia, with long-term current use of insulin (H) 07/20/2018     Priority: Medium     Left ventricular apical thrombus following MI (H) 07/06/2018     Priority: Medium     Hematuria 07/04/2018     Priority: Medium     Long term current use of anticoagulant 07/03/2018     Priority: Medium     Hypotension, unspecified hypotension type 07/02/2018     Priority: Medium     Monomorphic ventricular tachycardia (H) 07/02/2018     Priority: Medium     Overview:   Added automatically from request for surgery [id number]       ASCVD (arteriosclerotic cardiovascular disease) 06/27/2018     Priority: Medium     CKD (chronic kidney disease) stage 3, GFR 30-59 ml/min 06/27/2018     Priority: Medium     Dyslipidemia 06/27/2018     Priority: Medium     ST elevation myocardial infarction involving left anterior descending (LAD) coronary artery (H) 06/27/2018     Priority: Medium     Type 2 diabetes mellitus with hyperglycemia (H) 06/27/2018     Priority: Medium     ANDREW (acute kidney injury) (H) 10/06/2016     Priority: Medium     Ureteral obstruction 10/06/2016     Priority: Medium     Unilateral inguinal hernia 09/09/2009     Priority: Medium     Overview:   IMO Update 10/11         PAST MEDICAL HISTORY:  Past Medical History:   Diagnosis Date     Acute kidney injury (H)      CKD (chronic kidney disease)      Coronary artery disease      Diabetes mellitus (H)      HTN (hypertension)      Hyperlipidemia      ICD (implantable cardioverter-defibrillator), single chamber- NOT dependent 7/17/2018     Ischemic cardiomyopathy      LV (left ventricular) mural thrombus      Paroxysmal atrial flutter (H)      RVF (right ventricular failure) (H)      Systolic heart failure (H)      Ventricular tachycardia (H)         CURRENT MEDICATIONS:  Prescription Medications as of 2021       Rx Number Disp Refills Start End Last Dispensed Date Next Fill Date Owning Pharmacy    ACCU-CHEK [place] PLUS test strip  300 strip 0 2020    [address] [phone]    Sig: Check BG 3 times daily at meals and at bedtime.    Class: E-Prescribe    acetaminophen (TYLENOL) 325 MG tablet     2020        Sig: Take 2 tablets (650 mg) by mouth every 4 hours as needed for other (multimodal surgical pain management along with NSAIDS and opioid medication as indicated based on pain control and physical function.)    Class: OTC    Route: Oral    amLODIPine (NORVASC) 5 MG tablet  90 tablet 3 2020    [facility]/[place] [address]    Sig: Take 1 tablet (5 mg) by mouth daily    Class: E-Prescribe    Route: Oral    BD SILVANA U/F 32G X 4 MM insulin pen needle  600 each 3 10/9/2020    [facility]/[place] [address]    Sig: Use 6 times daily.    Class: E-Prescribe    calcium carbonate 600 mg-vitamin D 400 units (CALTRATE) 600-400 MG-UNIT per tablet     2020        Sig: Take 1 tablet by mouth 2 times daily (with meals)    Class: OTC    Route: Oral    Renewals     Renewal requests to authorizing provider (Damari Ohara, CARMEN MARKHAM) <b>prohibited</b>          Continuous Blood Gluc  (DEXCOM G6 ) DON  1 Device 1 2020    [facility]/[place] [address]    Si each daily    Class: E-Prescribe    Route: Does not apply    Prior authorization: Closed - Prior Authorization not required for patient/medication    Continuous Blood Gluc Sensor (DEXCOM G6 SENSOR) MISC  9 each 3 2020    [facility]/[place] [address]    Si each every 10 days    Class: E-Prescribe    Route: Does not apply    Prior authorization: Closed  - Prior Authorization not required for patient/medication    Continuous Blood Gluc Transmit (DEXCOM G6 TRANSMITTER) MISC  1 each 3 2020    [facility] [place] [address] [address]    Si each every 3 months    Class: E-Prescribe    Route: Does not apply    Prior authorization: Closed - Prior Authorization not required for patient/medication    hydrALAZINE (APRESOLINE) 50 MG tablet  540 tablet 3 10/13/2020    [facility] [place] [address] [address]    Sig: Take 2 tablets (100 mg) by mouth 3 times daily    Class: E-Prescribe    Route: Oral    insulin aspart (NOVOLOG PEN) 100 UNIT/ML pen  3 mL 0 10/27/2020    [facility] [place] [address] [address]    Sig: Inject 1-14 units per custom scale  For Pre-Meal BG less than 140, no additional insulin needed   to 159 give 1 units.    to 179 give 2 units.    to 199 give 3 units.    to 219 give 4 units.    to 239 give 5 units.    to 259 give 6 units.    to 279 give 7 units.    to 299 give 8 units.    to 319 give 9 units.    to 339 give 10 units.    to 359 give 11 units.    to 379 give 12 units.   to 399 give 13 units.  BG >/=400 give 14 units.    Class: Local Print    insulin aspart (NOVOLOG PEN) 100 UNIT/ML pen  3 mL 0 2020    [facility] - [place], [place] - [address]    Sig: Inject 1-11 Units Subcutaneous At Bedtime Inject 1-11 units at bedtime per custom scale   For Bedtime BG less than 200, no additional insulin needed   to 219 give 1 units.    to 239 give 2 units.    to 259 give 3 units.    to 279 give 4 units.    to 299 give 5 units.    to 319 give 6 units.    to 339 give 7 units.    to 359 give 8 units    to 379 give 9 units.   to 399 give 10 units.  BG >/=400 give 11 units.    Class: Historical    Route:  Subcutaneous    insulin glargine (LANTUS PEN) 100 UNIT/ML pen  15 mL 3 9/24/2020    [facility] [address]    Sig: Inject 24 Units Subcutaneous At Bedtime    Class: E-Prescribe    Notes to Pharmacy: If Lantus is not covered by insurance, may substitute Basaglar at same dose and frequency.      Route: Subcutaneous    loperamide (IMODIUM) 2 MG capsule  60 capsule 0 9/24/2020    [facility] [place] - [address]    Sig: Take 1 capsule (2 mg) by mouth 4 times daily as needed for diarrhea    Class: E-Prescribe    Route: Oral    magnesium oxide (MAG-OX) 400 (241.3 Mg) MG tablet  90 tablet 3 7/15/2020    [facility]/[place] [address] [address]    Sig: Take 1 tablet (400 mg) by mouth 2 times daily    Class: E-Prescribe    Route: Oral    multivitamin w/minerals (THERA-VIT-M) tablet  90 tablet 3 9/24/2020    [facility] [place] - [address]    Sig: Take 1 tablet by mouth daily    Class: E-Prescribe    Route: Oral    mycophenolate (GENERIC EQUIVALENT) 500 MG tablet  180 tablet 3 11/10/2020    [facility] [place] [address] [address]    Sig: Take 1 tablet (500 mg) by mouth 2 times daily    Class: E-Prescribe    Notes to Pharmacy: TXP DT 5/18/2020 (Heart) TXP Dischg DT 5/29/2020 DX Heart transplant Z94.1 [facility] [place] ([place], [place]    Route: Oral    NOVOLOG FLEXPEN 100 UNIT/ML soln  15 mL 3 11/27/2020    [facility] [place] [address] [address]    Sig: INJECT 1 TO 14 UNITS UNDER THE SKIN  WITH MEALS AND 1 TO 11 UNITS AT BEDTIME PER SLIDING SCALES, AND INJECT 1 UNIT UNDER THE SKIN PER 5 GRAMS OF CARBOHYDRATES WITH MEALS AND SNACKS    Class: E-Prescribe    pantoprazole (PROTONIX) 40 MG EC tablet  90 tablet 3 9/28/2020    [facility] [place] [address] [address]  "[address]    Sig: Take 1 tablet (40 mg) by mouth every morning (before breakfast)    Class: E-Prescribe    Route: Oral    rosuvastatin (CRESTOR) 10 MG tablet  90 tablet 3 7/27/2020    [facility] [place] [address]    Sig: TAKE ONE TABLET BY MOUTH ONCE DAILY    Class: E-Prescribe    tacrolimus (GENERIC EQUIVALENT) 1 MG capsule  180 capsule 11 9/30/2020    [facility] [place] [address] [address]    Sig: Take 4mg (4 caps) in the AM and 3mg (3 caps) in the PM.    Class: E-Prescribe    Notes to Pharmacy: TXP DT 5/18/2020 (Heart) TXP Dischg DT 5/29/2020 DX Heart transplant Z94.1 TX Center [facility]      Hospital Medications as of 1/5/2021       Dose Frequency Start End    lidocaine (LMX4) cream (Completed)  ONCE PRN 1/5/2021 1/5/2021    Admin Instructions: Apply to incision site for local anesthesia.    Class: E-Prescribe    Route: Topical          ROS:   Constitutional: No fever, chills, or sweats. Weight gain.   ENT: No visual disturbance, ear ache, epistaxis, sore throat.   Allergies/Immunologic: Negative.   Respiratory: As per HPI.  Cardiovascular: As per HPI.   GI: No nausea, vomiting, hematemesis, melena, or hematochezia.   : No urinary frequency, dysuria, or hematuria.   Integument: Negative.   Psychiatric: Negative.   Neuro: Negative.   Endocrinology: As per HPI.  Musculoskeletal: As per HPI.    Exam:  BP (!) 138/100 (BP Location: Right arm, Patient Position: Chair, Cuff Size: Adult Regular)   Pulse 113   Ht 1.626 m (5' 4\")   Wt 88.9 kg (196 lb)   SpO2 98%   BMI 33.64 kg/m    In general, the patient is a pleasant male in no apparent distress.    HEENT: NC/AT. PERRLA. EOMI.  Sclerae white, not injected.    Neck:  No adenopathy, No thyromegaly.    COR: No jugular venous distention when sitting upright.  RRR.  Normal S1 S2 splits physiologically.  No murmur, rub click, or gallop.    Lungs:  CTA. No " rhonchi.    Abdomen: soft, nontender, nondistended.  No organomegaly.  Extremities:  No clubbing or cyanosis, trace bilateral LE edema at ankles  Neuro: Alert & Oriented x 3, grossly non focal.  Integument: no open lesions, rashes, or jaundice.    Labs:  CBC RESULTS:   Lab Results   Component Value Date    WBC 6.2 01/05/2021    RBC 3.95 (L) 01/05/2021    HGB 11.1 (L) 01/05/2021    HCT 34.2 (L) 01/05/2021    MCV 87 01/05/2021    MCH 28.1 01/05/2021    MCHC 32.5 01/05/2021    RDW 12.9 01/05/2021     01/05/2021       BMP RESULTS:  Lab Results   Component Value Date     01/05/2021    POTASSIUM 4.8 01/05/2021    CHLORIDE 108 01/05/2021    CO2 27 01/05/2021    ANIONGAP 5 01/05/2021     (H) 01/05/2021    BUN 48 (H) 01/05/2021    CR 2.01 (H) 01/05/2021    GFRESTIMATED 35 (L) 01/05/2021    GFRESTBLACK 41 (L) 01/05/2021    ARLENE 9.4 01/05/2021      LIPID RESULTS:  Lab Results   Component Value Date    CHOL 161 10/29/2020    HDL 51 10/29/2020    LDL 81 10/29/2020    TRIG 144 10/29/2020    NHDL 110 10/29/2020       IMMUNOSUPPRESSANT LEVELS  Lab Results   Component Value Date    TACROL 5.0 01/05/2021    DOSTAC Not Provided 01/05/2021       No components found for: CK  Lab Results   Component Value Date    MAG 1.7 01/05/2021     Lab Results   Component Value Date    A1C 6.2 (H) 10/29/2020     Lab Results   Component Value Date    PHOS 3.0 01/05/2021     Lab Results   Component Value Date    NTBNPI 1,673 (H) 08/18/2020     Lab Results   Component Value Date    SAITESTMET SA FCS 10/29/2020    SAICELL Class I 10/29/2020    [id number] Cw:15 10/29/2020    [id number] Cw:18 10/29/2020    SAIREPCOM  10/29/2020      Test performed by modified procedure. Serum heat inactivated and tested   by a modified ([place]) protocol including fetal calf serum addition.   High-risk, mfi >3,000. Mod-risk, mfi 500-3,000.       Lab Results   Component Value Date    SAIITESTME SA FCS 10/29/2020    SAIICELL Class II 10/29/2020    [id number]  "None 10/29/2020    [id number] DR:10 10/29/2020    SAIIREPCOM  10/29/2020      Test performed by modified procedure. Serum heat inactivated and tested   by a modified ([place]) protocol including fetal calf serum addition.   High-risk, mfi >3,000. Mod-risk, mfi 500-3,000.       Lab Results   Component Value Date    CSPEC Plasma 10/29/2020       Assessment and Plan:   \"Red\" Aron is a 60yr old male with a history of CAD (STEMI with ALYSSA to LAD 6/27/18), ICM (LVEF 20-25%) s/p HM3 LVAD (12/31/18), monomorphic VT (s/p ICD with shocks), LV thrombus, CKD, elevated PSA, pAF, HTN, HL, and DMII, now s/p OHT 5/18/20, who presents to clinic for routine follow up.    ICM, s/p OHT 5/18/20  His post-transplant course was c/b NSVT (with no hemodynamic compromise), leukocytosis with C Acnes growing from his former LVAD site (thought to be a contaminant, but treated with IV vanco --> po doxy through 6/1/20, 14d course total), and in the setting of donor blood growing S anginosus and Veillonella (also thought to be a contaminant, but treated with Vanco/zosyn --> Vanco/Flagyl for a total of 7 days).  He also had hyperkalemia, which improved following kayexalate and stopping bactrim.  He ultimately discharged 5/29.    His biopsies have remained negative for rejection, and last AlloMap score from 10/2020 was 34.  He has no DSAs.  Baseline coronary angiogram has been deferred.  Last TTE 8/2020 showed stable graft function, with LVEF > 70% and normal RV size/function.  Last RHC 10/2020 showed normal biventricular filling pressures, with RA 5, mPA 20, PCW 15, and CI 2.9.      Labs today show stable electrolytes.  Creatinine 2.01.  LFTs normal.  CBC shows stable blood counts.  Tacro level 5.0.    RHC today showed normal biventricular filling pressures, with RA 4, mPA 14, PCW 11, and CI 2.91.    TTE today showed stable graft function, with LVEF > 65% and normal RV size/function.    AlloMap in process.  Biopsy results are in process.    Mr." Aron appears well today.  His BPs remain stable.  His weight is up, but he appears euvolemic on exam and per RHC from today.  Advised that he try to get regular aerobic activity, and continue heart-healthy dieting.    As his WBCs have improved, advised that he increase MMF to 750mg twice daily.  As his tacro level was low, advised that he increase tac to 4mg twice daily.  Will plan to repeat a BMP, CBC, and tacro level in 1 week.      Serostatus:  - CMV D+/R-  - EBV D-/R+  - Toxo D-/R-    Immunosuppression:  - increase MMF to 750mg BID  - tacro, goal level 6-8.  Level today was 5, so will increase tac to 4mg BID.    PPx:  - CAV:  Need to clarify if he is on aspirin.  Continue rosuvastatin 10mg daily.  - GI:  Pantoprazole 40mg daily  - Osteoporosis:  Calcium/vitamin d supplements    Graft function:  - BPs:  Controlled, continue amlodipine 5mg daily and hydralazine 100mg TID  - fluid status:  Euvolemic, not on diuretics    Health maintenance:  - needs shingrex, needs to determine if insurance will cover  - needs pneumonia shot  - unsure received flu shot?  Maybe 10/22 per [facility]?        Post-obstructive versus aspiration pneumonia  RUL/suprahilar mass   +abiotrophia defectiva  He was readmitted 8/31/20-9/24/20 with fever, cough, diarrhea, and syncope.  He was found to have a post-obstructive vs aspiration pneumonia, RUL/suprahilar mass, and narrowing of multiple RUL bronchi.  He was initially treated as a fungal infection as his fungitelle was +, but he did not clinically improve, nor did the size of the mass change.  Ultimately, tissue culture from the RUL mass showed abiotrophia defectiva (oral microbe), which was thought to be a contaminant, but did respond to antibiotics.  His MMF was decreased to 500mg twice daily, as ImmuKnow was noted to be low.  He completed 4 weeks of antibiotic therapy 10/11/20, and follow up chest CT from today is in process.  He follows with Transplant ID.    Leukopenia,  resolved  Increasing MMF as noted above, and will trend CBC.    DMII  BGs slightly elevated, following with endo.    Elevated PSA  Prostate MRI 5/2020 showed signs of BPH. He has been referred to urology.         The above was reviewed with Mr. Sharp, who verbalized understanding and will call with further questions/concerns.    45 minutes spent with patient, along with preparing for visit, reviewing follow up, and completing documentation.      Karen Benson, DNP, FNP-BC, CHFN  Advanced Heart Failure Nurse Practitioner  [facility]  Patient Care Team:  Milad Fry MD as PCP - General (Family Practice)  Antonia Byrne APRN CNP as Nurse Practitioner (Cardiology)  Jenni Ortiz MD as MD (Cardiology)  Megan Ibarra MD as MD (Urology)  Nina Gutierrez, RN as Specialty Care Coordinator (Urology)  Angelika Muller, RN as Transplant Coordinator (Cardiology)  Xavi Mckeon [facility] as Pharmacist (Pharmacist)  Jeannette Schafer PA-C as Physician Assistant (INTERNAL MEDICINE - ENDOCRINOLOGY, DIABETES & METABOLISM)  Tavares Rodriguez MD as Assigned Infectious Disease Provider  Donny Gomes MD as Assigned Cancer Care Provider  Kenton Carlos MD as Assigned Surgical Provider  JENNI ORTIZ

## 2021-01-05 NOTE — NURSING NOTE
Transplant Coordinator Note    Reason for visit: 8 month visit  Coordinator: Present   Caregiver:  none today    Health concerns addressed today:  1. Gained 10 pounds- 194 today  2. Shortness of breath much improved.  Coughing improved.  3. Some leg swelling.      Immunosuppressants:   BID  Tacro 4/3      Labs:   Creat 2  Tacro level 5    Additional Notes:         Labs, echo, CT reviewed with patient  Medication record reviewed and reconciled  Questions and concerns addressed  Pt verbalized an understanding of plan of care.     Patient Instructions  1. Try compression socks for legs if swelling becomes bothersome.  2. Work on increasing your fluid intake to help with creatinine levels.  3. Increase Cellcept to 750mg twice a day.   4. Increase your Tacrolimus dose to 4mg twice a day.  5.  Recheck your Tacrolimus level  a CBC and BMP in one week.    Next transplant clinic appointment:in 2 months  Next lab draw: One week for Tacro level and BMP.  Coordinator will call with all pending results.     Please call transplant coordinator with any questions:    Angelika Muller RN BSN   Post Heart Transplant Nurse Coordinator  [facility]  Questions: [phone]

## 2021-01-05 NOTE — PROGRESS NOTES
Mariusz Sharp is a 60 year old male who is being evaluated via a billable video visit.      How would you like to obtain your AVS? MyChart  If the video visit is dropped, the invitation should be resent by: Text to cell phone: [phone]  Will anyone else be joining your video visit? No    Outcome for 01/05/21 12:23 PM :Glucose sent via Email    Paola Beaulieu MA      Due to the COVID 19 pandemic this visit was converted to a video visit in order to help prevent spread of infection in this patient and the general population.    Time of start: 9:32 am  Time of end: 9:51 am  Total duration of video visit: 19 minutes.    CHANELL Vee ( Red ) ISABEL Sharp is a 60 year old male with type 2 diabetes mellitus.Video visit today for diabetes care.  Pt was admitted 5/17/2020-5/29/2020 and underwent a heart transplant on 5/18/2020.  Red was also admitted 8/31/2020-9/24/2020 with fever, cough, diarrhea and syncope in setting of increase RADER and was dx having post-obstructive vs apiration pneumonia. Pt also had RUL/suprahilar mass + abiotrophia defectiva with narrowing of multiple RUL bronchil. He was tx with antibiotics with improvement.  Pt's hx is significant for type 2 diabetes mellitus dx 3 years ago, HTN, hyperlipidemia, hx of ischemic heart disease s/p STEMI with ALYSSA 2019, LVAD placement, Stage 3 CKD, and obesity.  Apparently heart donor blood cx and respiratory cultures were + for H flu, staph and strep.  Red was also admitted 5/12-5/15/2020 for ANDREW due to nephrolithiasis complicated by hydronephrosis/ANDREW.  For his diabetes, pt is currently taking Lantus 26 units subcutaneous each pm and prescribed to take Novolog 1 unit/5 gms CHO with meals with correction scale  ( 1 unit/20 for BG > 140 ) and bedtime correction scale ( 1 unit/25 for BG > 200). However, he tells me he is unable to afford his Novolog and has been taking less Novolog.  Pt's A1C was 6.2 % on 10/29/2020 and A1C was 7.4 % on 8/19/2020. He is anemic.  Red  "wears a DexcomG6 sensor and I reviewed his Dexcom sensor download today.   His blood sugar values are higher with an average glucose of 216 and SD 47.  His blood sugar is in target 24 % of the time, above target 76 % of the time and below target 0 % of the time.  On ROS today,  breathing is better and decrease cough. Chest is \" sore\" from coughing.  No fevers or rigors.  Pt denies headaches, blurred vision, n/v, abd pain, diarrhea, dysuria, hematuria or numbness in his feet or hands.  Red denies foot ulcers at this time.    Diabetes Care  Retinopathy:none; pt seen by Oph in Dec 2019.  Nephropathy:yes- hx of CKD/ANDREW.  Neuropathy: none.  Foot Exam:no exam today.  Taking aspirin: yes.  Lipids: LDL 81 on 10/29/2020. Pt is taking Crestor.  CAD: yes; s/p heart transplant on 5/18/2020.  Depression: no.  Insulin: Basal and meal time insulin with correction scale ( 1 unit/20 for BG > 140 with meals and > 200 at bedtime).  Testing: DexcomG6 sensor.    ROS  See under HPI.    Allergies  No Known Allergies    Medications  Current Outpatient Medications   Medication Sig Dispense Refill     ACCU-CHEK [place] PLUS test strip Check BG 3 times daily at meals and at bedtime. (Patient not taking: Reported on 1/5/2021) 300 strip 0     acetaminophen (TYLENOL) 325 MG tablet Take 2 tablets (650 mg) by mouth every 4 hours as needed for other (multimodal surgical pain management along with NSAIDS and opioid medication as indicated based on pain control and physical function.)       amLODIPine (NORVASC) 5 MG tablet Take 1 tablet (5 mg) by mouth daily 90 tablet 3     BD SILVANA U/F 32G X 4 MM insulin pen needle Use 6 times daily. 600 each 3     calcium carbonate 600 mg-vitamin D 400 units (CALTRATE) 600-400 MG-UNIT per tablet Take 1 tablet by mouth 2 times daily (with meals)       Continuous Blood Gluc  (DEXCOM G6 ) DON 1 each daily 1 Device 1     Continuous Blood Gluc Sensor (DEXCOM G6 SENSOR) MISC 1 each every 10 days 9 each 3 "     Continuous Blood Gluc Transmit (DEXCOM G6 TRANSMITTER) MISC 1 each every 3 months 1 each 3     hydrALAZINE (APRESOLINE) 50 MG tablet Take 2 tablets (100 mg) by mouth 3 times daily 540 tablet 3     insulin glargine (LANTUS PEN) 100 UNIT/ML pen Inject 28 units subcutaneous at hs. 15 mL 3     loperamide (IMODIUM) 2 MG capsule Take 1 capsule (2 mg) by mouth 4 times daily as needed for diarrhea (Patient not taking: Reported on 1/5/2021) 60 capsule 0     magnesium oxide (MAG-OX) 400 (241.3 Mg) MG tablet Take 1 tablet (400 mg) by mouth 2 times daily 90 tablet 3     multivitamin w/minerals (THERA-VIT-M) tablet Take 1 tablet by mouth daily 90 tablet 3     pantoprazole (PROTONIX) 40 MG EC tablet Take 1 tablet (40 mg) by mouth every morning (before breakfast) 90 tablet 3     rosuvastatin (CRESTOR) 10 MG tablet TAKE ONE TABLET BY MOUTH ONCE DAILY 90 tablet 3     insulin aspart (NOVOLOG PEN) 100 UNIT/ML pen Inject 1-14 units per custom scale  For Pre-Meal BG less than 140, no additional insulin needed   to 159 give 1 units.    to 179 give 2 units.    to 199 give 3 units.    to 219 give 4 units.    to 239 give 5 units.    to 259 give 6 units.    to 279 give 7 units.    to 299 give 8 units.    to 319 give 9 units.    to 339 give 10 units.    to 359 give 11 units.    to 379 give 12 units.   to 399 give 13 units.  BG >/=400 give 14 units. (Patient not taking: Reported on 1/5/2021) 3 mL 0     insulin aspart (NOVOLOG PEN) 100 UNIT/ML pen Inject 1-11 Units Subcutaneous At Bedtime Inject 1-11 units at bedtime per custom scale   For Bedtime BG less than 200, no additional insulin needed   to 219 give 1 units.    to 239 give 2 units.    to 259 give 3 units.    to 279 give 4 units.    to 299 give 5 units.    to 319 give 6 units.    to 339 give 7 units.    to 359 give 8 units    to 379 give 9 units.    to 399 give 10 units.  BG >/=400 give 11 units. 3 mL 0     mycophenolate (GENERIC EQUIVALENT) 250 MG capsule Take 3 capsules (750 mg) by mouth 2 times daily 180 capsule 3     NOVOLOG FLEXPEN 100 UNIT/ML soln INJECT 1 TO 14 UNITS UNDER THE SKIN  WITH MEALS AND 1 TO 11 UNITS AT BEDTIME PER SLIDING SCALES, AND INJECT 1 UNIT UNDER THE SKIN PER 5 GRAMS OF CARBOHYDRATES WITH MEALS AND SNACKS 15 mL 3     tacrolimus (GENERIC EQUIVALENT) 1 MG capsule Take 4mg (4 caps) in the AM and 4mg (4 caps) in the PM. 240 capsule 3       Family History  Mother and father with hx of type 2 DM.  Social History   reports that he has never smoked. He has never used smokeless tobacco. He reports that he does not drink alcohol or use drugs.     Past Medical History  Past Medical History:   Diagnosis Date     Acute kidney injury (H)      CKD (chronic kidney disease)      Coronary artery disease      Diabetes mellitus (H)      HTN (hypertension)      Hyperlipidemia      ICD (implantable cardioverter-defibrillator), single chamber- NOT dependent 7/17/2018     Ischemic cardiomyopathy      LV (left ventricular) mural thrombus      Paroxysmal atrial flutter (H)      RVF (right ventricular failure) (H)      Systolic heart failure (H)      Ventricular tachycardia (H)        Past Surgical History:   Procedure Laterality Date     BRONCHOSCOPY (RIGID OR FLEXIBLE), DIAGNOSTIC N/A 9/15/2020    Procedure: BRONCHOSCOPY, WITH BRONCHOALVEOLAR LAVAGE;  Surgeon: Elder Mejia MD;  Location:  GI     BRONCHOSCOPY (RIGID OR FLEXIBLE), DIAGNOSTIC N/A 9/17/2020    Procedure: BRONCHOSCOPY, WITH BRONCHOALVEOLAR LAVAGE;  Surgeon: Bill Lemus MD;  Location:  OR     BRONCHOSCOPY RIGID OR FLEXIBLE W/TRANSENDOSCOPIC ENDOBRONCHIAL ULTRASOUND GUIDED Right 9/8/2020    Procedure: BRONCHOSCOPY, WITH ENDOBRONCHIAL ULTRASOUND;  Surgeon: Donny Mercedes MD;  Location:  GI     COLONOSCOPY N/A 12/7/2018    Procedure: COLONOSCOPY;  Surgeon: Krish Mercado MD;   Location: [facility]     CV HEART BIOPSY N/A 5/24/2020    Procedure: Heart Biopsy;  Surgeon: Kit Bacon MD;  [facility]     CV HEART BIOPSY N/A 6/1/2020    Procedure: CV HEART BIOPSY;  Surgeon: Kit Bacon MD;  [facility]     CV HEART BIOPSY N/A 6/8/2020    Procedure: CV HEART BIOPSY;  Surgeon: Kit Bacon MD;  [facility]     CV HEART BIOPSY N/A 6/15/2020    Procedure: CV HEART BIOPSY;  Surgeon: Kit Bacon MD;  [facility]     CV HEART BIOPSY N/A 6/30/2020    Procedure: CV HEART BIOPSY;  Surgeon: Kit Bacon MD;  [facility]     CV HEART BIOPSY N/A 7/14/2020    Procedure: CV HEART BIOPSY;  Surgeon: Brian Long MD;  [facility]     CV HEART BIOPSY N/A 7/29/2020    Procedure: CV HEART BIOPSY;  Surgeon: Momo Hunt MD;  [facility]     CV HEART BIOPSY N/A 8/13/2020    Procedure: CV HEART BIOPSY;  Surgeon: Khris Mcclelland MD;  [facility]     CV HEART BIOPSY N/A 8/26/2020    Procedure: CV HEART BIOPSY;  Surgeon: Momo Hunt MD;  [facility]     CV HEART BIOPSY N/A 9/30/2020    Procedure: CV HEART BIOPSY;  Surgeon: Artemio Ulloa MD;  [facility]     CV HEART BIOPSY N/A 10/29/2020    Procedure: CV HEART BIOPSY;  Surgeon: Kit Bacon MD;  [facility]     CV HEART BIOPSY N/A 1/5/2021    Procedure: CV HEART BIOPSY;  Surgeon: Carlin Garcia MD;  [facility]     CV RIGHT HEART CATH N/A 2/19/2019    Procedure: RHC;  Surgeon: Brian Long MD;  Location:  HEART CARDIAC CATH LAB     CV RIGHT HEART CATH N/A 7/5/2019    Procedure: CV RIGHT HEART CATH;  Surgeon: Khris Mcclelland MD;   [facility]     CV RIGHT HEART CATH N/A 5/24/2020    Procedure: Right Heart Cath;  Surgeon: Kit Bacon MD;  [facility]     CV RIGHT HEART CATH N/A 6/1/2020    Procedure: CV RIGHT HEART CATH;  Surgeon: Kit Bacon MD;  [facility]     CV RIGHT HEART CATH N/A 6/8/2020    Procedure: CV RIGHT HEART CATH;  Surgeon: Kit Bacon MD;  Location:  HEART CARDIAC CATH LAB     CV RIGHT HEART CATH N/A 6/15/2020    Procedure: CV RIGHT HEART CATH;  Surgeon: Kit Bacon MD;  Location:  HEART CARDIAC CATH LAB     CV RIGHT HEART CATH N/A 6/30/2020    Procedure: CV RIGHT HEART CATH;  Surgeon: Kit Bacon MD;  Location:  HEART CARDIAC CATH LAB     CV RIGHT HEART CATH N/A 7/14/2020    Procedure: CV RIGHT HEART CATH;  Surgeon: Brian Long MD;  Location:  HEART CARDIAC CATH LAB     CV RIGHT HEART CATH N/A 7/29/2020    Procedure: CV RIGHT HEART CATH;  Surgeon: Momo Hunt MD;  Location:  HEART CARDIAC CATH LAB     CV RIGHT HEART CATH N/A 8/13/2020    Procedure: CV RIGHT HEART CATH;  Surgeon: Khris Mcclelland MD;  Location:  HEART CARDIAC CATH LAB     CV RIGHT HEART CATH N/A 8/26/2020    Procedure: CV RIGHT HEART CATH;  Surgeon: Momo Hunt MD;  Location:  HEART CARDIAC CATH LAB     CV RIGHT HEART CATH N/A 9/30/2020    Procedure: Right Heart Cath;  Surgeon: Artemio Ulloa MD;  Location:  HEART CARDIAC CATH LAB     CV RIGHT HEART CATH N/A 10/29/2020    Procedure: CV RIGHT HEART CATH;  Surgeon: Kit Bacon MD;  Location:  HEART CARDIAC CATH LAB     CV RIGHT HEART CATH N/A 1/5/2021    Procedure: CV RIGHT HEART CATH;  Surgeon: Carlin Garcia MD;  Location:  HEART CARDIAC CATH LAB     ENDOBRONCHIAL ULTRASOUND FLEXIBLE N/A 9/17/2020    Procedure: BRONCHOSCOPY, flexible, endobronchial ultrasound with  transbronchial biopsies, endobronchial biopsies;  Surgeon: Bill Lemus MD;  Location: [facility]      PRQ [id number] CORONARY ANGIOPLASTY ADDL BRANCH      PCI and ALYSSA to ostial LAD on 6/27/18     IMPLANT AUTOMATIC IMPLANTABLE CARDIOVERTER DEFIBRILLATOR       INSERT VENTRICULAR ASSIST DEVICE LEFT (HEARTMATE II) N/A 12/31/2018    Procedure: Median Sternotomy, On Cardiopulmonary Bypass Pump, Insertion of Left Ventricular Assist Device (Heartmate III);  Surgeon: Kamaljit Baker MD;  Location: [facility]     PICC DOUBLE LUMEN PLACEMENT Right 05/22/2020    5FR DL PICC inserted at right basilic vein, length 38cm.     TRANSPLANT HEART RECIPIENT AFTER HEART MATE N/A 5/18/2020    Procedure: REDO MEDIAN STERNOTOMY, LYSIS OF ADHESIONS, ON CARDIOPULMONARY BYPASS PUMP, HEART TRANSPLANT RECIPIENT, REMOVAL OF LEFT VENTRICULAR ASSIST DEVICE, REMOVAL OF AUTOMATED IMPLANTABLE CARDIOVERTER DEFIBRILLATOR;  Surgeon: Elder Willis MD;  Location: [facility]       Physical Exam    No exam today.    RESULTS  Creatinine   Date Value Ref Range Status   01/05/2021 2.01 (H) 0.66 - 1.25 mg/dL Final     GFR Estimate   Date Value Ref Range Status   01/05/2021 35 (L) >60 mL/min/[1.73_m2] Final     Comment:     Non  GFR Calc  Starting 12/18/2018, serum creatinine based estimated GFR (eGFR) will be   calculated using the Chronic Kidney Disease Epidemiology Collaboration   (CKD-EPI) equation.       Hemoglobin A1C   Date Value Ref Range Status   10/29/2020 6.2 (H) 0 - 5.6 % Final     Comment:     Normal <5.7% Prediabetes 5.7-6.4%  Diabetes 6.5% or higher - adopted from ADA   consensus guidelines.       Potassium   Date Value Ref Range Status   01/05/2021 4.8 3.4 - 5.3 mmol/L Final     ALT   Date Value Ref Range Status   01/05/2021 19 0 - 70 U/L Final     AST   Date Value Ref Range Status   01/05/2021 12 0 - 45 U/L Final     TSH   Date Value Ref Range Status   06/26/2019 3.94 0.40 - 4.00 mU/L Final     T4 Free   Date Value Ref Range Status    07/23/2018 1.05 0.76 - 1.46 ng/dL Final       Cholesterol   Date Value Ref Range Status   10/29/2020 161 <200 mg/dL Final   09/01/2020 113 <200 mg/dL Final     HDL Cholesterol   Date Value Ref Range Status   10/29/2020 51 >39 mg/dL Final   09/01/2020 28 (L) >39 mg/dL Final     LDL Cholesterol Calculated   Date Value Ref Range Status   10/29/2020 81 <100 mg/dL Final     Comment:     Desirable:       <100 mg/dl   09/01/2020 57 <100 mg/dL Final     Comment:     Desirable:       <100 mg/dl     Triglycerides   Date Value Ref Range Status   10/29/2020 144 <150 mg/dL Final   09/01/2020 143 <150 mg/dL Final     A1C   6.2    10/29/2020  A1C   7.8    6/15/2020    ASSESSMENT/PLAN:      1.  TYPE 2 DIABETES MELLITUS: Type 2 diabetes mellitus complicated by hx of CAD and ischemic cardiomyopathy s/p heart transplant on 5/18/2020.   Red's DexcomG6 sensor shows his average glucose is higher at 216. He has been taking less Novolog stating he can't afford the Novolog.  I asked our staff to assist pt with getting in touch with the pharmacy staff and patient assistance program, so Red can get financial help with his insulin expense.  Increase Lantus 28 units subcutaneous at hs and continue Novolog I/C ratio to 1:5 with current correction scale ( 1 unit/20 for BG > 140 ).  He is to wear his DexcomG6 sensor full time.  Pt was seen by Oph in Dec 2019 without retinopathy.  He denies any sx of neuropathy or foot ulcers at this time.  He is taking ASA daily.  Pt states he had the flu vaccine this Fall.    2.  CKD/ANDREW: Most recent creat was 2.01 with GFR 35 mL/min on 1/5/2021.    3.  HX OF ISCHEMIC CARDIOMYOPATHY: S/P heart transplant on 5/18/2020 and followed closely by transplant staff here.     4.  FOLLOW UP : with me in 2 months.

## 2021-01-05 NOTE — IP AVS SNAPSHOT
[facility] [address] [address]                                    After Visit Summary   1/5/2021    Mariusz Sharp    MRN: [id number]           After Visit Summary Signature Page    I have received my discharge instructions, and my questions have been answered. I have discussed any challenges I see with this plan with the nurse or doctor.    ..........................................................................................................................................  Patient/Patient Representative Signature      ..........................................................................................................................................  Patient Representative Print Name and Relationship to Patient    ..................................................               ................................................  Date                                   Time    ..........................................................................................................................................  Reviewed by Signature/Title    ...................................................              ..............................................  Date                                               Time          22EPIC Rev 08/18

## 2021-01-05 NOTE — DISCHARGE INSTRUCTIONS
[facility]                        Interventional Cardiology  Discharge Instructions   Post Right Heart Cath      AFTER YOU GO HOME:    DO drink plenty of fluids    DO resume your regular diet and medications unless otherwise instructed by your Primary Physician    Do Not scrub the procedure site vigorously    No lotion or powder to the puncture site for 3 days    CALL YOUR PRIMARY PHYSICIAN IF: You may resume all normal activity.  Monitor neck site for bleeding, swelling, or voice changes. If you notice bleeding or swelling immediately apply pressure to the site and call number below to speak with Cardiology Fellow.  If you experience any changes in your breathing you should call your doctor immediately or come to the closest Emergency Department.  Do not drive yourself.    ADDITIONAL INSTRUCTIONS: Medications: You are to resume all home medications including anticoagulation therapy unless otherwise advised by your primary cardiologist or nurse coordinator.    Follow Up: Per your primary cardiology team    If you have any questions or concerns regarding your procedure site please call [phone] at anytime and ask for Cardiology Fellow on call.  They are available 24 hours a day.  You may also contact the Cardiology Clinic after hours number at [phone].                                                       Telephone Numbers [phone] Monday-Friday 8:00 am to 4:30 pm    [phone] After 4:30 pm Monday-Friday, Weekends & Holidays  Ask for Interventional Cardiologist on call. Someone is on call 24 hours/day   [facility] toll free number [phone] Monday-Friday 8:00 am to 4:30 pm   [facility] Emergency Dept [phone]

## 2021-01-06 ENCOUNTER — VIRTUAL VISIT (OUTPATIENT)
Dept: ENDOCRINOLOGY | Facility: CLINIC | Age: 61
End: 2021-01-06
Payer: COMMERCIAL

## 2021-01-06 ENCOUNTER — TELEPHONE (OUTPATIENT)
Dept: ENDOCRINOLOGY | Facility: CLINIC | Age: 61
End: 2021-01-06

## 2021-01-06 DIAGNOSIS — Z94.1 STATUS POST HEART TRANSPLANTATION (H): ICD-10-CM

## 2021-01-06 LAB — COPATH REPORT: NORMAL

## 2021-01-06 PROCEDURE — 99213 OFFICE O/P EST LOW 20 MIN: CPT | Mod: 95 | Performed by: PHYSICIAN ASSISTANT

## 2021-01-06 NOTE — TELEPHONE ENCOUNTER
Message for patient assistance sent to Dafne Gann . He picked up lantus and novolog 1/4/21 at [facility] in [place] ( transferred scripts) and because it's the first of the year he has deductibles to meet so it was expensive . Otherwise a 30 day supply usually was  $9 after deductible was met.Christina Ramirez RN on 1/6/2021 at 11:39 AM

## 2021-01-06 NOTE — TELEPHONE ENCOUNTER
----- Message from Jeannette Schafer PA-C sent at 1/6/2021  9:49 AM CST -----  Red has been using less Novolog since his supply is low. He has been told he can not refill his Novolog-not sure what's going on with this.  He has completed a form for Novolog assistance.  Can you please call him and follow up on this and review his form with him today.  Thank you.  Leticia

## 2021-01-06 NOTE — LETTER
1/6/2021       RE: Mariusz Sharp  [address] [facility] [address]     Dear Colleague,    Thank you for referring your patient, Mariusz Sharp, to the [facility] [place] at [facility]. Please see a copy of my visit note below.    Mariusz Sharp is a 60 year old male who is being evaluated via a billable video visit.      How would you like to obtain your AVS? MyChart  If the video visit is dropped, the invitation should be resent by: Text to cell phone: [phone]  Will anyone else be joining your video visit? No    Outcome for 01/05/21 12:23 PM :Glucose sent via Email    Paola Beaulieu MA      Due to the COVID 19 pandemic this visit was converted to a video visit in order to help prevent spread of infection in this patient and the general population.    Time of start: 9:32 am  Time of end: 9:51 am  Total duration of video visit: 19 minutes.    HPI  Mariusz ( Red ) ISABEL Sharp is a 60 year old male with type 2 diabetes mellitus.Video visit today for diabetes care.  Pt was admitted 5/17/2020-5/29/2020 and underwent a heart transplant on 5/18/2020.  Red was also admitted 8/31/2020-9/24/2020 with fever, cough, diarrhea and syncope in setting of increase RADER and was dx having post-obstructive vs apiration pneumonia. Pt also had RUL/suprahilar mass + abiotrophia defectiva with narrowing of multiple RUL bronchil. He was tx with antibiotics with improvement.  Pt's hx is significant for type 2 diabetes mellitus dx 3 years ago, HTN, hyperlipidemia, hx of ischemic heart disease s/p STEMI with ALYSSA 2019, LVAD placement, Stage 3 CKD, and obesity.  Apparently heart donor blood cx and respiratory cultures were + for H flu, staph and strep.  Red was also admitted 5/12-5/15/2020 for ANDREW due to nephrolithiasis complicated by hydronephrosis/ANDREW.  For his diabetes, pt is currently taking Lantus 26 units subcutaneous each pm and prescribed to take Novolog 1  "unit/5 gms CHO with meals with correction scale  ( 1 unit/20 for BG > 140 ) and bedtime correction scale ( 1 unit/25 for BG > 200). However, he tells me he is unable to afford his Novolog and has been taking less Novolog.  Pt's A1C was 6.2 % on 10/29/2020 and A1C was 7.4 % on 8/19/2020. He is anemic.  Red wears a DexcomG6 sensor and I reviewed his Dexcom sensor download today.   His blood sugar values are higher with an average glucose of 216 and SD 47.  His blood sugar is in target 24 % of the time, above target 76 % of the time and below target 0 % of the time.  On ROS today,  breathing is better and decrease cough. Chest is \" sore\" from coughing.  No fevers or rigors.  Pt denies headaches, blurred vision, n/v, abd pain, diarrhea, dysuria, hematuria or numbness in his feet or hands.  Red denies foot ulcers at this time.    Diabetes Care  Retinopathy:none; pt seen by Oph in Dec 2019.  Nephropathy:yes- hx of CKD/ANDREW.  Neuropathy: none.  Foot Exam:no exam today.  Taking aspirin: yes.  Lipids: LDL 81 on 10/29/2020. Pt is taking Crestor.  CAD: yes; s/p heart transplant on 5/18/2020.  Depression: no.  Insulin: Basal and meal time insulin with correction scale ( 1 unit/20 for BG > 140 with meals and > 200 at bedtime).  Testing: DexcomG6 sensor.    ROS  See under HPI.    Allergies  No Known Allergies    Medications  Current Outpatient Medications   Medication Sig Dispense Refill     ACCU-CHEK [place]OTTE PLUS test strip Check BG 3 times daily at meals and at bedtime. (Patient not taking: Reported on 1/5/2021) 300 strip 0     acetaminophen (TYLENOL) 325 MG tablet Take 2 tablets (650 mg) by mouth every 4 hours as needed for other (multimodal surgical pain management along with NSAIDS and opioid medication as indicated based on pain control and physical function.)       amLODIPine (NORVASC) 5 MG tablet Take 1 tablet (5 mg) by mouth daily 90 tablet 3     BD SILVANA U/F 32G X 4 MM insulin pen needle Use 6 times daily. 600 each 3     " calcium carbonate 600 mg-vitamin D 400 units (CALTRATE) 600-400 MG-UNIT per tablet Take 1 tablet by mouth 2 times daily (with meals)       Continuous Blood Gluc  (DEXCOM G6 ) DON 1 each daily 1 Device 1     Continuous Blood Gluc Sensor (DEXCOM G6 SENSOR) MISC 1 each every 10 days 9 each 3     Continuous Blood Gluc Transmit (DEXCOM G6 TRANSMITTER) MISC 1 each every 3 months 1 each 3     hydrALAZINE (APRESOLINE) 50 MG tablet Take 2 tablets (100 mg) by mouth 3 times daily 540 tablet 3     insulin glargine (LANTUS PEN) 100 UNIT/ML pen Inject 28 units subcutaneous at hs. 15 mL 3     loperamide (IMODIUM) 2 MG capsule Take 1 capsule (2 mg) by mouth 4 times daily as needed for diarrhea (Patient not taking: Reported on 1/5/2021) 60 capsule 0     magnesium oxide (MAG-OX) 400 (241.3 Mg) MG tablet Take 1 tablet (400 mg) by mouth 2 times daily 90 tablet 3     multivitamin w/minerals (THERA-VIT-M) tablet Take 1 tablet by mouth daily 90 tablet 3     pantoprazole (PROTONIX) 40 MG EC tablet Take 1 tablet (40 mg) by mouth every morning (before breakfast) 90 tablet 3     rosuvastatin (CRESTOR) 10 MG tablet TAKE ONE TABLET BY MOUTH ONCE DAILY 90 tablet 3     insulin aspart (NOVOLOG PEN) 100 UNIT/ML pen Inject 1-14 units per custom scale  For Pre-Meal BG less than 140, no additional insulin needed   to 159 give 1 units.    to 179 give 2 units.    to 199 give 3 units.    to 219 give 4 units.    to 239 give 5 units.    to 259 give 6 units.    to 279 give 7 units.    to 299 give 8 units.    to 319 give 9 units.    to 339 give 10 units.    to 359 give 11 units.    to 379 give 12 units.   to 399 give 13 units.  BG >/=400 give 14 units. (Patient not taking: Reported on 1/5/2021) 3 mL 0     insulin aspart (NOVOLOG PEN) 100 UNIT/ML pen Inject 1-11 Units Subcutaneous At Bedtime Inject 1-11 units at bedtime per custom scale   For Bedtime BG less than  200, no additional insulin needed   to 219 give 1 units.    to 239 give 2 units.    to 259 give 3 units.    to 279 give 4 units.    to 299 give 5 units.    to 319 give 6 units.    to 339 give 7 units.    to 359 give 8 units    to 379 give 9 units.   to 399 give 10 units.  BG >/=400 give 11 units. 3 mL 0     mycophenolate (GENERIC EQUIVALENT) 250 MG capsule Take 3 capsules (750 mg) by mouth 2 times daily 180 capsule 3     NOVOLOG FLEXPEN 100 UNIT/ML soln INJECT 1 TO 14 UNITS UNDER THE SKIN  WITH MEALS AND 1 TO 11 UNITS AT BEDTIME PER SLIDING SCALES, AND INJECT 1 UNIT UNDER THE SKIN PER 5 GRAMS OF CARBOHYDRATES WITH MEALS AND SNACKS 15 mL 3     tacrolimus (GENERIC EQUIVALENT) 1 MG capsule Take 4mg (4 caps) in the AM and 4mg (4 caps) in the PM. 240 capsule 3       Family History  Mother and father with hx of type 2 DM.  Social History   reports that he has never smoked. He has never used smokeless tobacco. He reports that he does not drink alcohol or use drugs.     Past Medical History  Past Medical History:   Diagnosis Date     Acute kidney injury (H)      CKD (chronic kidney disease)      Coronary artery disease      Diabetes mellitus (H)      HTN (hypertension)      Hyperlipidemia      ICD (implantable cardioverter-defibrillator), single chamber- NOT dependent 7/17/2018     Ischemic cardiomyopathy      LV (left ventricular) mural thrombus      Paroxysmal atrial flutter (H)      RVF (right ventricular failure) (H)      Systolic heart failure (H)      Ventricular tachycardia (H)        Past Surgical History:   Procedure Laterality Date     BRONCHOSCOPY (RIGID OR FLEXIBLE), DIAGNOSTIC N/A 9/15/2020    Procedure: BRONCHOSCOPY, WITH BRONCHOALVEOLAR LAVAGE;  Surgeon: Elder Mejia MD;  Location:  GI     BRONCHOSCOPY (RIGID OR FLEXIBLE), DIAGNOSTIC N/A 9/17/2020    Procedure: BRONCHOSCOPY, WITH BRONCHOALVEOLAR LAVAGE;  Surgeon: Bill Lemus MD;   [facility]: [facility]     BRONCHOSCOPY RIGID OR FLEXIBLE W/TRANSENDOSCOPIC ENDOBRONCHIAL ULTRASOUND GUIDED Right 9/8/2020    Procedure: BRONCHOSCOPY, WITH ENDOBRONCHIAL ULTRASOUND;  Surgeon: Donny Mercedes MD;  Location:  GI     COLONOSCOPY N/A 12/7/2018    Procedure: COLONOSCOPY;  Surgeon: Krish Mercado MD;  Location: [facility] OR     CV HEART BIOPSY N/A 5/24/2020    Procedure: Heart Biopsy;  Surgeon: Kit Bacon MD;  Location:  HEART CARDIAC CATH LAB     CV HEART BIOPSY N/A 6/1/2020    Procedure: CV HEART BIOPSY;  Surgeon: Kit Bacon MD;  Location:  HEART CARDIAC CATH LAB     CV HEART BIOPSY N/A 6/8/2020    Procedure: CV HEART BIOPSY;  Surgeon: Kit Bacon MD;  Location:  HEART CARDIAC CATH LAB     CV HEART BIOPSY N/A 6/15/2020    Procedure: CV HEART BIOPSY;  Surgeon: Kit Bacon MD;  Location:  HEART CARDIAC CATH LAB     CV HEART BIOPSY N/A 6/30/2020    Procedure: CV HEART BIOPSY;  Surgeon: Kit Bacon MD;  Location:  HEART CARDIAC CATH LAB     CV HEART BIOPSY N/A 7/14/2020    Procedure: CV HEART BIOPSY;  Surgeon: Brian Long MD;  Location:  HEART CARDIAC CATH LAB     CV HEART BIOPSY N/A 7/29/2020    Procedure: CV HEART BIOPSY;  Surgeon: Momo Hunt MD;  Location:  HEART CARDIAC CATH LAB     CV HEART BIOPSY N/A 8/13/2020    Procedure: CV HEART BIOPSY;  Surgeon: Khris Mcclelland MD;  Location:  HEART CARDIAC CATH LAB     CV HEART BIOPSY N/A 8/26/2020    Procedure: CV HEART BIOPSY;  Surgeon: Momo Hunt MD;  Location:  HEART CARDIAC CATH LAB     CV HEART BIOPSY N/A 9/30/2020    Procedure: CV HEART BIOPSY;  Surgeon: Artemio Ulloa MD;  Location:  HEART CARDIAC CATH LAB     CV HEART BIOPSY N/A 10/29/2020    Procedure: CV HEART BIOPSY;  Surgeon: Kit Bacon MD;  Location:  HEART CARDIAC CATH LAB     CV HEART BIOPSY N/A 1/5/2021    Procedure: CV HEART  BIOPSY;  Surgeon: Carlin Garcia MD;  [facility]     CV RIGHT HEART CATH N/A 2/19/2019    Procedure: RHC;  Surgeon: Brian Long MD;  Location: [facility] HEART CARDIAC CATH LAB     CV RIGHT HEART CATH N/A 7/5/2019    Procedure: CV RIGHT HEART CATH;  Surgeon: Khris Mcclelland MD;  Location: [facility] HEART CARDIAC CATH LAB     CV RIGHT HEART CATH N/A 5/24/2020    Procedure: Right Heart Cath;  Surgeon: Kit Bacon MD;  [facility]: [facility]AC [facility] LAB     CV RIGHT HEART CATH N/A 6/1/2020    Procedure: CV RIGHT HEART CATH;  Surgeon: Kit Bacon MD;  Location: [facility] HEART CARDIAC CATH LAB     CV RIGHT HEART CATH N/A 6/8/2020    Procedure: CV RIGHT HEART CATH;  Surgeon: Kit Bacon MD;  Location: [facility] HEART CARDIAC CATH LAB     CV RIGHT HEART CATH N/A 6/15/2020    Procedure: CV RIGHT HEART CATH;  Surgeon: Kit Bacon MD;  Location: [facility] HEART CARDIAC CATH LAB     CV RIGHT HEART CATH N/A 6/30/2020    Procedure: CV RIGHT HEART CATH;  Surgeon: Kit Bacon MD;  Location:  HEART CARDIAC CATH LAB     CV RIGHT HEART CATH N/A 7/14/2020    Procedure: CV RIGHT HEART CATH;  Surgeon: Brian Long MD;  Location:  HEART CARDIAC CATH LAB     CV RIGHT HEART CATH N/A 7/29/2020    Procedure: CV RIGHT HEART CATH;  Surgeon: Momo Hunt MD;  Location: [facility] HEART CARDIAC CATH LAB     CV RIGHT HEART CATH N/A 8/13/2020    Procedure: CV RIGHT HEART CATH;  Surgeon: Khris Mcclelland MD;  Location:  HEART CARDIAC CATH LAB     CV RIGHT HEART CATH N/A 8/26/2020    Procedure: CV RIGHT HEART CATH;  Surgeon: Momo Hunt MD;  Location:  HEART CARDIAC CATH LAB     CV RIGHT HEART CATH N/A 9/30/2020    Procedure: Right Heart Cath;  Surgeon: Artemio Ulloa MD;  Location:  HEART CARDIAC CATH LAB     CV RIGHT HEART CATH N/A 10/29/2020    Procedure: CV RIGHT HEART CATH;  Surgeon: Kit Bacon  MD Vero;  Location:  HEART CARDIAC CATH LAB     CV RIGHT HEART CATH N/A 1/5/2021    Procedure: CV RIGHT HEART CATH;  Surgeon: Carlin Garcia MD;  Location:  HEART CARDIAC CATH LAB     ENDOBRONCHIAL ULTRASOUND FLEXIBLE N/A 9/17/2020    Procedure: BRONCHOSCOPY, flexible, endobronchial ultrasound with transbronchial biopsies, endobronchial biopsies;  Surgeon: Bill Lemus MD;  Location: [facility]      PRQ TRLUML CORONARY ANGIOPLASTY ADDL BRANCH      PCI and ALYSSA to ostial LAD on 6/27/18     IMPLANT AUTOMATIC IMPLANTABLE CARDIOVERTER DEFIBRILLATOR       INSERT VENTRICULAR ASSIST DEVICE LEFT (HEARTMATE II) N/A 12/31/2018    Procedure: Median Sternotomy, On Cardiopulmonary Bypass Pump, Insertion of Left Ventricular Assist Device (Heartmate III);  Surgeon: Kamaljit Baker MD;  Location: [facility] OR     PICC DOUBLE LUMEN PLACEMENT Right 05/22/2020    5FR DL PICC inserted at right basilic vein, length 38cm.     TRANSPLANT HEART RECIPIENT AFTER HEART MATE N/A 5/18/2020    Procedure: REDO MEDIAN STERNOTOMY, LYSIS OF ADHESIONS, ON CARDIOPULMONARY BYPASS PUMP, HEART TRANSPLANT RECIPIENT, REMOVAL OF LEFT VENTRICULAR ASSIST DEVICE, REMOVAL OF AUTOMATED IMPLANTABLE CARDIOVERTER DEFIBRILLATOR;  Surgeon: Elder Willis MD;  [facility]       Physical Exam    No exam today.    RESULTS  Creatinine   Date Value Ref Range Status   01/05/2021 2.01 (H) 0.66 - 1.25 mg/dL Final     GFR Estimate   Date Value Ref Range Status   01/05/2021 35 (L) >60 mL/min/[1.73_m2] Final     Comment:     Non  GFR Calc  Starting 12/18/2018, serum creatinine based estimated GFR (eGFR) will be   calculated using the Chronic Kidney Disease Epidemiology Collaboration   (CKD-EPI) equation.       Hemoglobin A1C   Date Value Ref Range Status   10/29/2020 6.2 (H) 0 - 5.6 % Final     Comment:     Normal <5.7% Prediabetes 5.7-6.4%  Diabetes 6.5% or higher - adopted from ADA   consensus guidelines.       Potassium   Date Value Ref  Range Status   01/05/2021 4.8 3.4 - 5.3 mmol/L Final     ALT   Date Value Ref Range Status   01/05/2021 19 0 - 70 U/L Final     AST   Date Value Ref Range Status   01/05/2021 12 0 - 45 U/L Final     TSH   Date Value Ref Range Status   06/26/2019 3.94 0.40 - 4.00 mU/L Final     T4 Free   Date Value Ref Range Status   07/23/2018 1.05 0.76 - 1.46 ng/dL Final       Cholesterol   Date Value Ref Range Status   10/29/2020 161 <200 mg/dL Final   09/01/2020 113 <200 mg/dL Final     HDL Cholesterol   Date Value Ref Range Status   10/29/2020 51 >39 mg/dL Final   09/01/2020 28 (L) >39 mg/dL Final     LDL Cholesterol Calculated   Date Value Ref Range Status   10/29/2020 81 <100 mg/dL Final     Comment:     Desirable:       <100 mg/dl   09/01/2020 57 <100 mg/dL Final     Comment:     Desirable:       <100 mg/dl     Triglycerides   Date Value Ref Range Status   10/29/2020 144 <150 mg/dL Final   09/01/2020 143 <150 mg/dL Final     A1C   6.2    10/29/2020  A1C   7.8    6/15/2020    ASSESSMENT/PLAN:      1.  TYPE 2 DIABETES MELLITUS: Type 2 diabetes mellitus complicated by hx of CAD and ischemic cardiomyopathy s/p heart transplant on 5/18/2020.   Red's DexcomG6 sensor shows his average glucose is higher at 216. He has been taking less Novolog stating he can't afford the Novolog.  I asked our staff to assist pt with getting in touch with the pharmacy staff and patient assistance program, so Red can get financial help with his insulin expense.  Increase Lantus 28 units subcutaneous at hs and continue Novolog I/C ratio to 1:5 with current correction scale ( 1 unit/20 for BG > 140 ).  He is to wear his DexcomG6 sensor full time.  Pt was seen by Oph in Dec 2019 without retinopathy.  He denies any sx of neuropathy or foot ulcers at this time.  He is taking ASA daily.  Pt states he had the flu vaccine this Fall.    2.  CKD/ANDREW: Most recent creat was 2.01 with GFR 35 mL/min on 1/5/2021.    3.  HX OF ISCHEMIC CARDIOMYOPATHY: S/P heart  transplant on 5/18/2020 and followed closely by transplant staff here.     4.  FOLLOW UP : with me in 2 months.    Sincerely,    Jeannette Schafer PA-C

## 2021-01-07 ENCOUNTER — VIRTUAL VISIT (OUTPATIENT)
Dept: UROLOGY | Facility: CLINIC | Age: 61
End: 2021-01-07
Payer: COMMERCIAL

## 2021-01-07 DIAGNOSIS — R97.20 ELEVATED PROSTATE SPECIFIC ANTIGEN (PSA): Primary | ICD-10-CM

## 2021-01-07 LAB
ALLOMAP SCORE (EXTERNAL): 32 (ref 0–40)
NEGATIVE PREDICTIVE VALUE PERCENT (EXTERNAL): 99 %
POSITIVE PREDICTIVE VALUE PERCENT (EXTERNAL): 2.9 %

## 2021-01-07 PROCEDURE — 99442 PR PHYSICIAN TELEPHONE EVALUATION 11-20 MIN: CPT | Performed by: UROLOGY

## 2021-01-07 ASSESSMENT — PAIN SCALES - GENERAL: PAINLEVEL: NO PAIN (0)

## 2021-01-07 NOTE — PROGRESS NOTES
Mariusz Sharp is a 60 year old male who is being evaluated via a billable telephone visit.      Patient has given verbal consent for Telephone visit?  Yes    What phone number would you like to be contacted at? [phone]    How would you like to obtain your AVS? Lucius    Chief Complaint   It was my pleasure to speak with Mariusz Sharp who is a 60 year old male for follow-up of elevated PSA.      HPI  Mariusz Sharp is a very pleasant 60 year old male who presents with a history of Elevated PSA.  In hospital for past month. Medical history notable for heart transplant 5/18/2020. Has also seen Dr. Ibarra and Dr. Carlos in the past. Has known large renal stone. Planning to check in with Dr. Carlos again in several weeks, following recovery from his recent hospitalization. He reports no significant urinary symptoms. His PSA tests were done while he was inpatient. No hematuria or dysuria.  He has not had this rechecked.    MRI Prostate    IMPRESSION:  1. Based on the most suspicious abnormality, this exam is  characterized as PIRADS 2 - Clinically significant cancer is unlikely  to be present.   Benign prostatic hyperplasia changes with estimated  volume of 47 gram.  2. No suspicious adenopathy or evidence of pelvic metastases.     PSA  9/30/2020 - 11.0 (10% free)  8/19/2020 - 10.80  5/18/2020 - 8.21  5/15/2020 - 8.51  3/27/2019 - 3.83    I have reviewed and updated the patient's Past Medical History, Social History, Family History and Medication List.    Review of Systems   Constitutional, HEENT, cardiovascular, pulmonary, gi and gu systems are negative, except as otherwise noted.    ALLERGIES  Patient has no known allergies.    Vitals:  No vitals were obtained today due to virtual visit.    Physical Exam   healthy, alert and no distress  PSYCH: Alert and oriented times 3; coherent speech, normal   rate and volume, able to articulate logical thoughts, able   to abstract reason, no tangential thoughts, no  hallucinations   or delusions  His affect is normal  RESP: No cough, no audible wheezing, able to talk in full sentences  Remainder of exam unable to be completed due to telephone visits    ASSESSMENT and PLAN  60 year old male with history of heart transplant in May 2020. During hospitalizations, found to have elevated PSA, 11.0 9/2020. He has continued to recover with regard to his heart.  Also has large renal stone for which he follows with Dr. Carlos. Regarding his PSA, he should have this rechecked soon, and will consider prostate biopsy pending results. He is also due to follow-up with Dr. Carlos for large left renal stone.    PSA recheck in next several weeks  Needs follow-up with Dr. Carlos about left renal stone - if planning procedure, could consider concurrent prostate biopsy    Phone call duration: 11 minutes    Donny Gomes MD  Urology  [facility]

## 2021-01-07 NOTE — PATIENT INSTRUCTIONS
Please get PSA   Please make a appointment with      It was a pleasure meeting with you today.  Thank you for allowing me and my team the privilege of caring for you today.  YOU are the reason we are here, and I truly hope we provided you with the excellent service you deserve.  Please let us know if there is anything else we can do for you so that we can be sure you are leaving completely satisfied with your care experience.

## 2021-01-07 NOTE — PROGRESS NOTES
"Mariusz Sharp is a 60 year old male who is being evaluated via a billable video visit.      The patient has been notified of following:     \"This video visit will be conducted via a call between you and your physician/provider. We have found that certain health care needs can be provided without the need for an in-person physical exam.  This service lets us provide the care you need with a video conversation.  If a prescription is necessary we can send it directly to your pharmacy.  If lab work is needed we can place an order for that and you can then stop by our lab to have the test done at a later time.    Video visits are billed at different rates depending on your insurance coverage.  Please reach out to your insurance provider with any questions.    If during the course of the call the physician/provider feels a video visit is not appropriate, you will not be charged for this service.\"    Patient has given verbal consent for Video visit? {YES-NO  Default Yes:4444::\"Yes\"}    How would you like to obtain your AVS? {AVS Preference:200803}    Patient would like the video invitation sent by: {video visit invitation:[id number]}    Will anyone else be joining your video visit? {:[id number]}  {If patient encounters technical issues they should call [phone] :[id number]}      CHIEF COMPLAINT   It was my pleasure to see Mariusz Sharp who is a 60 year old male for follow-up of ***.      HPI   Mariusz Sharp is a very pleasant 60 year old male who presents with a history of {Diagnoses:561550}.  ***    Mariusz Sharp is a very pleasant 59 year old male who presents with a history of Elevated PSA.  In hospital for past month. Medical history notable for heart transplant 5/18/2020. Has also seen Dr. Ibarra and Dr. Carlos in the past. Has known large renal stone. Planning to check in with Dr. Carlos again in several weeks, following recovery from his recent hospitalization. He reports no significant urinary symptoms. His PSA " tests were done while he was inpatient. No hematuria or dysuria.     IMPRESSION:  1. Based on the most suspicious abnormality, this exam is  characterized as PIRADS 2 - Clinically significant cancer is unlikely  to be present.   Benign prostatic hyperplasia changes with estimated  volume of 47 gram.  2. No suspicious adenopathy or evidence of pelvic metastases.     PSA  9/30/2020 - 11.0 (10% free)  8/19/2020 - 10.80  5/18/2020 - 8.21  5/15/2020 - 8.51  3/27/2019 - 3.83         Allergies:   Patient has no known allergies.         Review of Systems:  From intake questionnaire     Skin: negative  Eyes: negative  Ears/Nose/Throat: negative  Respiratory: No shortness of breath, dyspnea on exertion, cough, or hemoptysis  Cardiovascular: No chest pain or palpitations  Gastrointestinal: negative; no nausea/vomiting, constipation or diarrhea  Genitourinary: as per HPI  Musculoskeletal: negative  Neurologic: negative  Psychiatric: negative  Hematologic/Lymphatic/Immunologic: negative  Endocrine: negative         Physical Exam:     Patient is a 60 year old  male   Vitals: There were no vitals taken for this visit.  Constitutional: There is no height or weight on file to calculate BMI.  Alert, no acute distress, oriented, conversant  Eyes: no scleral icterus; extraocular muscles intact  Respiratory: no respiratory distress, or pursed lip breathing  Musculoskeletal: normal range of motion  Skin: no notable lesions visible  Neuro: Alert, oriented, speech and mentation normal  Psych: affect and mood normal, alert and oriented to person, place and time  ***         Assessment and Plan:     Assessment:   {Diagnoses:614093}    Plan:  PSA recheck in next several weeks  Needs follow-up with Dr. Carlos about left renal stone - if planning procedure, could consider concurrent prostate biopsy    Orders  Orders Placed This Encounter   Procedures     PSA tumor marker     Did by phone    Donny Gomes MD  Urology  [facility]

## 2021-01-12 ENCOUNTER — VIRTUAL VISIT (OUTPATIENT)
Dept: INFECTIOUS DISEASES | Facility: CLINIC | Age: 61
End: 2021-01-12
Attending: INTERNAL MEDICINE
Payer: COMMERCIAL

## 2021-01-12 DIAGNOSIS — R91.8 MASS OF UPPER LOBE OF RIGHT LUNG: Primary | ICD-10-CM

## 2021-01-12 DIAGNOSIS — Z94.1 HEART REPLACED BY TRANSPLANT (H): ICD-10-CM

## 2021-01-12 DIAGNOSIS — J18.9 POSTOBSTRUCTIVE PNEUMONIA: ICD-10-CM

## 2021-01-12 DIAGNOSIS — J69.0 ASPIRATION PNEUMONIA OF RIGHT MIDDLE LOBE, UNSPECIFIED ASPIRATION PNEUMONIA TYPE (H): ICD-10-CM

## 2021-01-12 DIAGNOSIS — Z23 NEED FOR VACCINATION: ICD-10-CM

## 2021-01-12 PROCEDURE — 99213 OFFICE O/P EST LOW 20 MIN: CPT | Mod: 95 | Performed by: INTERNAL MEDICINE

## 2021-01-12 ASSESSMENT — PAIN SCALES - GENERAL: PAINLEVEL: NO PAIN (0)

## 2021-01-12 NOTE — PROGRESS NOTES
Red is a 60 year old who is being evaluated via a billable video visit. [employer] please send a link to his phone  He can't login to my chart.     How would you like to obtain your AVS? MyChart  If the video visit is dropped, the invitation should be resent by: Send to e-mail at: [email or web address]  Will anyone else be joining your video visit? No      Video Start Time: 6:40  Video-Visit Details    Type of service:  Video Visit    Video End Time:7:00    Originating Location (pt. Location): Home    Distant Location (provider location):  [facility] [place]     Platform used for Video Visit: [employer]

## 2021-01-12 NOTE — LETTER
1/12/2021       RE: Mariusz Sharp  [address] [facility] [address]     Dear Colleague,    Thank you for referring your patient, Mariusz Sharp, to the [facility] INFECTIOUS DISEASE CLINIC [place] at [facility] Please see a copy of my visit note below.    Red is a 60 year old who is being evaluated via a billable video visit. Doximity please send a link to his phone  He can't login to my chart.     How would you like to obtain your AVS? MyChart  If the video visit is dropped, the invitation should be resent by: Send to e-mail at: [email or web address]  Will anyone else be joining your video visit? No      Video Start Time: 6:40  Video-Visit Details    Type of service:  Video Visit    Video End Time:7:00    Originating Location (pt. Location): Home    Distant Location (provider location):  [facility] INFECTIOUS DISEASE [facility]     Platform used for Video Visit: MDSave    [facility]    Transplant Infectious Diseases Outpatient Progress note      Patient:  Mariusz Sharp, Date of birth 1960, Medical record number [id number]  Date of Visit:  01/12/2021         Recommendations:   1. Continue off of ABx.   2. Needs the shingrix vaccine series and the 23-valent pneumovax.     RTC: 3 months.          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   Recently discharged after hospitalization for cough, fever, shortness of breath and post tussive LOC. Treated for RUL pneumonia.         Active Problems and Infectious Diseases Issues:   1. Likely aspiration pneumonia vs postobstructive pneumonia  The patient responded to IV zosyn while he was in the hospital with resolution of fever and improvement of chest CT. His clinical and radiological findings did not improve with  posaconazole, which was discontinued.   The patient finished a total of 4 weeks of ABx on 10/11/2020.   The cough has significantly improved.      The BALs on 9/8/2020, 9/15/2020 and 9/17/2020 were all unremarkable except for the growth of Abiotrophia defectiva (oral simona).   The TBBx were all non-diagnostic/unremarkable.     Given the improving symptoms with no fever and almost resolution of the cough, we do not need repeat CT unless symptoms worsened.         Old Problems and Infectious Diseases Issues:   1. 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   2. C acnes in the extracted VAD; treated with vancomycin then doxycycline.   3. Was diagnosed with possible chronic prostatitis and was treated with ABx for longer than one month between 8/2020 and 10/2020.     Other Infectious Disease issues include:  - QTc 475 as of 9/16/2020.   - PCP prophylaxis: bactrim caused hyperkalemia, then pentamidine. Now off PJP ppx.   - Serostatus: CMV D+/R-, EBV D-/R+, HSV1?/2?, VZV +, Toxo D-/R-     On VGCV ppx per protocol.   - immunization: Needs the shingrix vaccine series and the 23-valent pneumovax.   - Gamma globulin status: 510 as of 9/13/2020       Attestation:  Total duration of visit including chart review, reviewing labs and imaging, interviewing and examining the patient, documentation, and sending communication to the patient and to the primary treating team: 20 minutes.     Tavares Rodriguez MD   Pager: [phone]  01/12/2021         Interim History:   Remains afebrile. Still with cough but the cough has significantly improved and almost resolved.   No other complaints.         HPI:       Transplants:  5/18/2020 (Heart); Postoperative day:  239  In summary:  Presented to the hospital with 24 hr of dyspnea, fever, and cough with post tussive LOC.   He was found to have RUL consolidation vs obstructive LN vs mass.   Non-invasive workup with urine and blood Histo and  Blasto Ag were negative, A galactomannan negative, BD glucan low positive at 86 and 90.  He was treated with posaconazole empirically from 9/4/2020 till 9/13/202 when he spiked fever and CT showed worsening of the mass/consolidation. Zosyn was initiated on 9/13/2020 for possible post obstructive pneumonia with resolution of fever and posaconazole was discontinued due to lack of effectiveness.   Bronchoscopy with TBBx on 9/8/2020 and 9/15/2020 were both not diagnostic. FNA on 9/17/202 and BAL the same day were also not significant.   The biopsy from 9/8/2020 was also sent for NGS by PCR of 28S DNA and was negative for fungal infection.   EBV was not detected.     Given the significant improvement with zosyn, it was believed that the RUL infiltrate was due to aspiration or postobstructive pneumonia. He was discharged on cipro and clindamycin PO till 10/11/2020 for total of 4 weeks.            Immunizations:     Immunization History   Administered Date(s) Administered     HepA-Adult 06/14/2019, 01/03/2020     HepB-Adult 06/07/2019, 07/11/2019, 01/03/2020     Mantoux Tuberculin Skin Test 01/12/2019     Pneumo Conj 13-V (2010&after) 06/07/2019     TDAP Vaccine (Boostrix) 08/28/2007, 06/07/2019             Allergies:   No Known Allergies          Medications:     Current Outpatient Medications   Medication Sig     acetaminophen (TYLENOL) 325 MG tablet Take 2 tablets (650 mg) by mouth every 4 hours as needed for other (multimodal surgical pain management along with NSAIDS and opioid medication as indicated based on pain control and physical function.)     amLODIPine (NORVASC) 5 MG tablet Take 1 tablet (5 mg) by mouth daily     BD SILVANA U/F 32G X 4 MM insulin pen needle Use 6 times daily.     calcium carbonate 600 mg-vitamin D 400 units (CALTRATE) 600-400 MG-UNIT per tablet Take 1 tablet by mouth 2 times daily (with meals)     hydrALAZINE (APRESOLINE) 50 MG tablet Take 2 tablets (100 mg) by mouth 3 times daily     insulin  aspart (NOVOLOG PEN) 100 UNIT/ML pen Inject 1-14 units per custom scale  For Pre-Meal BG less than 140, no additional insulin needed   to 159 give 1 units.    to 179 give 2 units.    to 199 give 3 units.    to 219 give 4 units.    to 239 give 5 units.    to 259 give 6 units.    to 279 give 7 units.    to 299 give 8 units.    to 319 give 9 units.    to 339 give 10 units.    to 359 give 11 units.    to 379 give 12 units.   to 399 give 13 units.  BG >/=400 give 14 units.     insulin aspart (NOVOLOG PEN) 100 UNIT/ML pen Inject 1-11 Units Subcutaneous At Bedtime Inject 1-11 units at bedtime per custom scale   For Bedtime BG less than 200, no additional insulin needed   to 219 give 1 units.    to 239 give 2 units.    to 259 give 3 units.    to 279 give 4 units.    to 299 give 5 units.    to 319 give 6 units.    to 339 give 7 units.    to 359 give 8 units    to 379 give 9 units.   to 399 give 10 units.  BG >/=400 give 11 units.     insulin glargine (LANTUS PEN) 100 UNIT/ML pen Inject 28 units subcutaneous at hs.     magnesium oxide (MAG-OX) 400 (241.3 Mg) MG tablet Take 1 tablet (400 mg) by mouth 2 times daily     multivitamin w/minerals (THERA-VIT-M) tablet Take 1 tablet by mouth daily     mycophenolate (GENERIC EQUIVALENT) 250 MG capsule Take 3 capsules (750 mg) by mouth 2 times daily     NOVOLOG FLEXPEN 100 UNIT/ML soln INJECT 1 TO 14 UNITS UNDER THE SKIN  WITH MEALS AND 1 TO 11 UNITS AT BEDTIME PER SLIDING SCALES, AND INJECT 1 UNIT UNDER THE SKIN PER 5 GRAMS OF CARBOHYDRATES WITH MEALS AND SNACKS     pantoprazole (PROTONIX) 40 MG EC tablet Take 1 tablet (40 mg) by mouth every morning (before breakfast)     rosuvastatin (CRESTOR) 10 MG tablet TAKE ONE TABLET BY MOUTH ONCE DAILY     tacrolimus (GENERIC EQUIVALENT) 1 MG capsule Take 4mg (4 caps) in the AM and 4mg (4 caps) in the PM.      ACCU-CHEK CHARLOTTE PLUS test strip Check BG 3 times daily at meals and at bedtime.     Continuous Blood Gluc  (DEXCOM G6 ) DON 1 each daily (Patient not taking: Reported on 1/12/2021)     Continuous Blood Gluc Sensor (DEXCOM G6 SENSOR) MISC 1 each every 10 days (Patient not taking: Reported on 1/12/2021)     Continuous Blood Gluc Transmit (DEXCOM G6 TRANSMITTER) MISC 1 each every 3 months (Patient not taking: Reported on 1/12/2021)     loperamide (IMODIUM) 2 MG capsule Take 1 capsule (2 mg) by mouth 4 times daily as needed for diarrhea (Patient not taking: Reported on 1/12/2021)     No current facility-administered medications for this visit.             Review of Systems:   As mentioned in the interim history otherwise negative by reviewing constitutional symptoms, central and peripheral neurological systems, respiratory system, cardiac system, GI system,  system, musculoskeletal, skin, allergy, and lymphatics.                  Physical Exam:   There were no vitals taken for this visit. it was a virtual visit.   He did not cough during this virtual visit, he did not sound short of breath.            Laboratory Data:     Absolute CD4   Date Value Ref Range Status   09/12/2020 359 (L) 441 - 2,156 cells/uL Final       Inflammatory Markers    Recent Labs   Lab Test 07/23/18  0645   CRP 11.0*       Immune Globulin Studies      Recent Labs   Lab Test 09/13/20  0612   *   IGM 29*   *          Metabolic Studies    Recent Labs   Lab Test 01/05/21  0832 10/29/20  0955 09/30/20  0835 09/24/20  0451 09/23/20  0531 09/22/20  0530 09/13/20  0804 09/13/20  0804 09/01/20  0658 09/01/20  0658 06/15/20  0826 06/15/20  0826    137 136 136 135 135   < >  --    < > 134   < > 139   POTASSIUM 4.8 4.1 4.6 5.0 5.0 4.9   < >  --    < > 3.8   < > 4.8   CHLORIDE 108 106 108 109 107 108   < >  --    < > 104   < > 110*   CO2 27 23 20 19* 20 20   < >  --    < > 22   < > 21   ANIONGAP 5 8 8 8 8 8   < >   --    < > 9   < > 8   BUN 48* 44* 40* 35* 35* 33*   < >  --    < > 21   < > 98*   CR 2.01* 1.89* 1.92* 1.70* 1.72* 1.76*   < >  --    < > 1.38*   < > 1.89*   GFRESTIMATED 35* 38* 38* 44* 43* 42*   < >  --    < > 56*   < > 38*   * 157* 172* 107* 180* 160*   < >  --    < > 118*   < > 120*   A1C  --  6.2*  --   --   --   --   --   --   --   --    < > 7.8*   ARLENE 9.4 9.2 9.2 9.0 9.2 8.5   < >  --    < > 8.5   < > 9.0   PHOS 3.0 4.1 4.3 4.1 3.3 3.3   < >  --   --   --    < > 4.4   MAG 1.7 1.5* 1.5* 2.0 2.0 1.8   < >  --    < > 1.4*   < > 1.7   LACT  --   --   --   --   --   --   --  0.7  --  0.9   < >  --    CKT  --  69  --   --   --   --   --   --   --   --   --  132    < > = values in this interval not displayed.       Hepatic Studies    Recent Labs   Lab Test 01/05/21  0832 10/29/20  0955 09/30/20  0835 09/24/20  0451 09/22/20  0530 09/21/20  0527 09/12/20  1204 09/12/20  1204 09/01/20  0658 09/01/20  0658   BILITOTAL 0.6 0.8 0.5 0.3 0.5 0.4   < >  --    < > 0.9   ALKPHOS 85 64 69 75 84 84   < >  --    < > 62   PROTTOTAL 7.5 7.5 7.0 6.0* 6.1* 6.0*   < >  --    < > 6.1*   ALBUMIN 3.8 4.0 3.1* 2.3* 2.3* 2.1*   < >  --    < > 2.4*   AST 12 22 19 30 38 52*   < >  --    < > 16   ALT 19 17 23 46 69 82*   < >  --    < > 11   LDH  --   --   --   --   --   --   --  380*  --  228*    < > = values in this interval not displayed.       Hematology Studies     Recent Labs   Lab Test 01/05/21  0832 10/29/20  0955 09/30/20  0835 09/24/20  0451 09/23/20  0531 09/21/20  0527 09/05/20  0549 09/05/20  0549 09/04/20  0543 09/03/20  0526 09/02/20  0551   WBC 6.2 2.1* 4.4 3.0* 3.7* 3.3*   < > 5.3 3.8* 3.1* 2.8*   ANEU  --  1.0*  --   --   --  2.2  --  4.1 3.1 2.4 1.8   ALYM  --  0.8  --   --   --  0.7*  --  0.8 0.3* 0.3* 0.7*   MARTHA  --  0.2  --   --   --  0.2  --  0.3 0.2 0.2 0.2   AEOS  --  0.0  --   --   --  0.0  --  0.0 0.2 0.1 0.1   HGB 11.1* 10.3* 8.9* 8.2* 9.1* 8.1*   < > 8.3* 8.7* 8.3* 8.1*   HCT 34.2* 32.8* 29.1* 26.8* 30.1*  26.7*   < > 26.5* 27.5* 27.0* 26.7*    225 230 307 345 348   < > 353 376 333 354    < > = values in this interval not displayed.       Clotting Studies    Recent Labs   Lab Test 08/26/20  0939 06/04/20  0900 06/01/20  0856 05/18/20  1835 05/18/20  1630 05/18/20  1248 05/18/20  0010   INR 1.06 1.16* 1.16* 1.54* 2.21* 1.42* 2.19*   PTT  --   --   --  31 31 34 38*       Urine Studies    Recent Labs   Lab Test 09/16/20  1620 09/14/20  2020 08/31/20  1615 08/20/20  1630 08/18/20  1404   URINEPH 5.5 5.5 5.5 5.5 5.5   NITRITE Negative Negative Negative Negative Negative   LEUKEST Trace* Negative Negative Small* Moderate*   WBCU 5 4 1 12* 28*         Microbiology:  Last 6 Culture results with specimen source  Culture Micro   Date Value Ref Range Status   09/17/2020 No Actinomyces species isolated  Final   09/17/2020 Culture negative for acid fast bacilli  Final   09/17/2020   Final    Assayed at [facility] [address] [place] [id number] [phone]   09/17/2020 Culture negative after 4 weeks  Final   09/17/2020 No Legionella species isolated  Final   09/17/2020 No growth after 4 weeks  Final   09/17/2020 (A)  Final    On day 3, isolated in broth only:  Abiotrophia defectiva  Susceptibility testing not routinely done      Specimen Description   Date Value Ref Range Status   09/17/2020 Lung Right upper lobe Lesion  Final   09/17/2020 Lung Right upper lobe Lesion  Final   09/17/2020 Lung Right upper lobe Lesion  Final   09/17/2020 Lung Right upper lobe Lesion  Final   09/17/2020 Lung Right upper lobe Lesion  Final   09/17/2020 Lung Right upper lobe Lesion  Final   09/17/2020 Lung Right upper lobe Lesion  Final   09/17/2020 Lung Right upper lobe Lesion  Final   09/17/2020 Lung Right upper lobe Lesion  Final        Last check of C difficile  C Diff Toxin B PCR   Date Value Ref Range Status   09/17/2020 Negative NEG^Negative Final     Comment:     Negative: C. difficile target DNA sequences NOT detected,  presumed negative   for C.difficile toxin B or the number of bacteria present may be below the   limit of detection for the test.  FDA approved assay performed using [employer] GeneXpert real-time PCR.  A negative result does not exclude actual disease due to C. difficile and may   be due to improper collection, handling and storage of the specimen or the   number of organisms in the specimen is below the detection limit of the assay.           Virology:  CMV viral loads    CMV viral loads    CMV DNA Quant (External)   Date Value Ref Range Status   11/12/2020 Undetected Undetected IU/mL Final     CMV viral loads    Recent Labs   Lab Test 11/12/20  1514 10/29/20  0955 09/13/20  0612   CSPEC  --  Plasma Plasma   CMVLOG  --  Not Calculated Not Calculated   62856 Undetected  --   --        CMV viral loads    Log IU/mL of CMVQNT   Date Value Ref Range Status   10/29/2020 Not Calculated <2.1 [Log_IU]/mL Final   09/13/2020 Not Calculated <2.1 [Log_IU]/mL Final   09/01/2020 Not Calculated <2.1 [Log_IU]/mL Final   08/19/2020 Not Calculated <2.1 [Log_IU]/mL Final   08/13/2020 Not Calculated <2.1 [Log_IU]/mL Final   06/15/2020 Not Calculated <2.1 [Log_IU]/mL Final     CMV DNA Quant (External)   Date Value Ref Range Status   11/12/2020 Undetected Undetected IU/mL Final       CMV resistance testing  No lab results found.  No results found for: CMVCID, CMVFOS, CMVGAN     EBV viral loads     Recent Labs   Lab Test 10/29/20  0955 09/01/20  0658 08/13/20  0840 06/15/20  0826   EBRES EBV DNA Not Detected EBV DNA Not Detected EBV DNA Not Detected EBV DNA Not Detected     EBV DNA Copies/mL   Date Value Ref Range Status   10/29/2020 EBV DNA Not Detected EBVNEG^EBV DNA Not Detected [Copies]/mL Final   09/01/2020 EBV DNA Not Detected EBVNEG^EBV DNA Not Detected [Copies]/mL Final   08/13/2020 EBV DNA Not Detected EBVNEG^EBV DNA Not Detected [Copies]/mL Final   06/15/2020 EBV DNA Not Detected EBVNEG^EBV DNA Not Detected [Copies]/mL Final        Human Herpes Virus 6 viral loads    No results found for: H6RES No results found for: H6SPEC    CMV Antibody IgG   Date Value Ref Range Status   05/18/2020 0.3 0.0 - 0.8 AI Final     Comment:     Negative  Antibody index (AI) values reflect qualitative changes in antibody   concentration that cannot be directly associated with clinical condition or   disease state.       Toxoplasma Antibody IgG   Date Value Ref Range Status   05/18/2020 <3.0 0.0 - 7.1 IU/mL Final     Comment:     Negative- Absence of detectable Toxoplasma gondii IgG antibodies. A negative   result does not rule out acute infection.  The magnitude of the measured result is not indicative of the amount of   antibody present. The concentrations of anti-Toxoplasma gondii IgG in a given   specimen determined with assays from different manufacturers can vary due to   differences in assay methods and reagent specificity.         Pathology:  RUL FNA 9/17/2020  Lung, right upper lobe lesion, endobronchial ultrasound guided fine needle    aspiration:   - Negative for malignancy   TBBx 9/8/2020  FINAL DIAGNOSIS:   A. No specimen received.   B. LUNG, RIGHT UPPER LOBE, ENDOBRONCHIAL BIOPSIES:   - Two small fragments of respiratory mucosa with chronic inflammation,   frequent eosinophils, submucosal edema   and focal squamous metaplasia.   - Negative for granulomatous inflammation or malignancy in this biopsy,     Imaging:  CT chest 1/5/2021   IMPRESSION:   1.  Continuing decrease in size of right hilar soft tissue attenuating  mass with less pronounced mass effect on the right upper lobe  bronchus, bronchus intermedius and right middle lobe bronchus.  Multiple nonenlarged mediastinal nodes are likely reactive in  etiology. Given that patient is a transplant recipient and chronically  immunosuppressed, following these findings until resolution may be  considered.   2.  Atherosclerosis.  3.  Cholelithiasis.  CT chest 10/29/2020  IMPRESSION:  1. Grossly  unchanged right perihilar consolidative mass/soft tissue  thickening with improved mass effect on the right mainstem bronchus  and right upper lobe bronchi. Additionally, there is mildly improved  prominent mediastinal lymph nodes and right hilar lymphadenopathy.  These findings are most consistent with improving  infectious/postinfectious etiology. Recommend follow up to resolution  as malignancy is not entirely excluded.  2. Resolution of previous right lower lobe bronchocentric  reticulonodular opacities.  3. Additional incisional/chronic findings as noted above, not  significantly changed from 10/2/2020.  CT chest 10/2/2020  IMPRESSION:  Abnormalities are stable to slightly improved from  9/21/2020.  CT 9/21/2020 reviewed with the radiologist  Impression: Positive treatment response as evidenced by:  1. Moderate decrease in size of right suprahilar mass. This mass has  aggressive features including mediastinal and bronchial invasion as  well as mediastinal lymphadenopathy. These invasive features have  improved.  2. Previously occluded right upper lobe bronchi are now patent,  although remains mildly narrowed.  3. Improvement and bronchocentric groundglass and nodular opacities in  the right lower lobe favored to be infectious.  4. Near complete resolution of right-sided pleural effusion with  resolution of intralobular septal thickening in the right lower lobe.  CT chest 9/13/2020  IMPRESSION:  1. Increased size of the right hilar mass/consolidation with  associated narrowing of the subsegmental right upper lobe bronchioles  and multiple scattered pulmonary nodules as above. Differential  continues to include infection versus malignancy with postobstructive  pneumonia.  2. Increased bibasilar groundglass and right lung tree-in-bud nodular  opacities suggestive of infection, possibly secondary to aspiration.  Small right pleural effusion.  3. Surgical changes of cardiac transplantation.  4. Cholelithiasis  without cholecystitis.  CT chest 9/2/2020   IMPRESSION:   1. New mass vs consolidation in the right upper lobe and right hilum  since 7/29/2020 with narrowing of multiple right upper lobe bronchi.   1a. Differential pneumonia with reactive right hilar lymphadenopathy  vs. malignancy(ex. Lymphoma/PTLD) with post obstructive pneumonia.  2. Stable additional pulmonary lesions as above.  3. Stable postsurgical changes of heart transplant.  4. Cholelithiasis without evidence of cholecystitis.      Sincerely,    Tavares Rodriguez MD

## 2021-01-13 NOTE — PROGRESS NOTES
[facility]    Transplant Infectious Diseases Outpatient Progress note      Patient:  Mariusz Sharp, Date of birth 1960, Medical record number [id number]  Date of Visit:  01/12/2021         Recommendations:   1. Continue off of ABx.   2. Needs the shingrix vaccine series and the 23-valent pneumovax.     RTC: 3 months.          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   Recently discharged after hospitalization for cough, fever, shortness of breath and post tussive LOC. Treated for RUL pneumonia.         Active Problems and Infectious Diseases Issues:   1. Likely aspiration pneumonia vs postobstructive pneumonia  The patient responded to IV zosyn while he was in the hospital with resolution of fever and improvement of chest CT. His clinical and radiological findings did not improve with posaconazole, which was discontinued.   The patient finished a total of 4 weeks of ABx on 10/11/2020.   The cough has significantly improved.      The BALs on 9/8/2020, 9/15/2020 and 9/17/2020 were all unremarkable except for the growth of Abiotrophia defectiva (oral simona).   The TBBx were all non-diagnostic/unremarkable.     Given the improving symptoms with no fever and almost resolution of the cough, we do not need repeat CT unless symptoms worsened.         Old Problems and Infectious Diseases Issues:   1. 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   2. C acnes in the extracted VAD; treated with vancomycin then doxycycline.   3. Was diagnosed with possible chronic prostatitis and was treated with ABx for longer than one month between 8/2020 and 10/2020.     Other Infectious Disease issues include:  - QTc 475 as of 9/16/2020.   - PCP prophylaxis: bactrim caused hyperkalemia, then pentamidine.  Now off PJP ppx.   - Serostatus: CMV D+/R-, EBV D-/R+, HSV1?/2?, VZV +, Toxo D-/R-     On VGCV ppx per protocol.   - immunization: Needs the shingrix vaccine series and the 23-valent pneumovax.   - Gamma globulin status: 510 as of 9/13/2020       Attestation:  Total duration of visit including chart review, reviewing labs and imaging, interviewing and examining the patient, documentation, and sending communication to the patient and to the primary treating team: 20 minutes.     Tavares Rodriguez MD   Pager: [phone]  01/12/2021         Interim History:   Remains afebrile. Still with cough but the cough has significantly improved and almost resolved.   No other complaints.         HPI:       Transplants:  5/18/2020 (Heart); Postoperative day:  239  In summary:  Presented to the hospital with 24 hr of dyspnea, fever, and cough with post tussive LOC.   He was found to have RUL consolidation vs obstructive LN vs mass.   Non-invasive workup with urine and blood Histo and Blasto Ag were negative, A galactomannan negative, BD glucan low positive at 86 and 90.  He was treated with posaconazole empirically from 9/4/2020 till 9/13/202 when he spiked fever and CT showed worsening of the mass/consolidation. Zosyn was initiated on 9/13/2020 for possible post obstructive pneumonia with resolution of fever and posaconazole was discontinued due to lack of effectiveness.   Bronchoscopy with TBBx on 9/8/2020 and 9/15/2020 were both not diagnostic. FNA on 9/17/202 and BAL the same day were also not significant.   The biopsy from 9/8/2020 was also sent for NGS by PCR of 28S DNA and was negative for fungal infection.   EBV was not detected.     Given the significant improvement with zosyn, it was believed that the RUL infiltrate was due to aspiration or postobstructive pneumonia. He was discharged on cipro and clindamycin PO till 10/11/2020 for total of 4 weeks.            Immunizations:     Immunization History   Administered Date(s)  Administered     HepA-Adult 06/14/2019, 01/03/2020     HepB-Adult 06/07/2019, 07/11/2019, 01/03/2020     Mantoux Tuberculin Skin Test 01/12/2019     Pneumo Conj 13-V (2010&after) 06/07/2019     TDAP Vaccine (Boostrix) 08/28/2007, 06/07/2019             Allergies:   No Known Allergies          Medications:     Current Outpatient Medications   Medication Sig     acetaminophen (TYLENOL) 325 MG tablet Take 2 tablets (650 mg) by mouth every 4 hours as needed for other (multimodal surgical pain management along with NSAIDS and opioid medication as indicated based on pain control and physical function.)     amLODIPine (NORVASC) 5 MG tablet Take 1 tablet (5 mg) by mouth daily     BD SILVANA U/F 32G X 4 MM insulin pen needle Use 6 times daily.     calcium carbonate 600 mg-vitamin D 400 units (CALTRATE) 600-400 MG-UNIT per tablet Take 1 tablet by mouth 2 times daily (with meals)     hydrALAZINE (APRESOLINE) 50 MG tablet Take 2 tablets (100 mg) by mouth 3 times daily     insulin aspart (NOVOLOG PEN) 100 UNIT/ML pen Inject 1-14 units per custom scale  For Pre-Meal BG less than 140, no additional insulin needed   to 159 give 1 units.    to 179 give 2 units.    to 199 give 3 units.    to 219 give 4 units.    to 239 give 5 units.    to 259 give 6 units.    to 279 give 7 units.    to 299 give 8 units.    to 319 give 9 units.    to 339 give 10 units.    to 359 give 11 units.    to 379 give 12 units.   to 399 give 13 units.  BG >/=400 give 14 units.     insulin aspart (NOVOLOG PEN) 100 UNIT/ML pen Inject 1-11 Units Subcutaneous At Bedtime Inject 1-11 units at bedtime per custom scale   For Bedtime BG less than 200, no additional insulin needed   to 219 give 1 units.    to 239 give 2 units.    to 259 give 3 units.    to 279 give 4 units.    to 299 give 5 units.    to 319 give 6 units.    to 339 give 7 units.    to  359 give 8 units    to 379 give 9 units.   to 399 give 10 units.  BG >/=400 give 11 units.     insulin glargine (LANTUS PEN) 100 UNIT/ML pen Inject 28 units subcutaneous at hs.     magnesium oxide (MAG-OX) 400 (241.3 Mg) MG tablet Take 1 tablet (400 mg) by mouth 2 times daily     multivitamin w/minerals (THERA-VIT-M) tablet Take 1 tablet by mouth daily     mycophenolate (GENERIC EQUIVALENT) 250 MG capsule Take 3 capsules (750 mg) by mouth 2 times daily     NOVOLOG FLEXPEN 100 UNIT/ML soln INJECT 1 TO 14 UNITS UNDER THE SKIN  WITH MEALS AND 1 TO 11 UNITS AT BEDTIME PER SLIDING SCALES, AND INJECT 1 UNIT UNDER THE SKIN PER 5 GRAMS OF CARBOHYDRATES WITH MEALS AND SNACKS     pantoprazole (PROTONIX) 40 MG EC tablet Take 1 tablet (40 mg) by mouth every morning (before breakfast)     rosuvastatin (CRESTOR) 10 MG tablet TAKE ONE TABLET BY MOUTH ONCE DAILY     tacrolimus (GENERIC EQUIVALENT) 1 MG capsule Take 4mg (4 caps) in the AM and 4mg (4 caps) in the PM.     ACCU-CHEK CHARLOTTE PLUS test strip Check BG 3 times daily at meals and at bedtime.     Continuous Blood Gluc  (DEXCOM G6 ) DON 1 each daily (Patient not taking: Reported on 1/12/2021)     Continuous Blood Gluc Sensor (DEXCOM G6 SENSOR) MISC 1 each every 10 days (Patient not taking: Reported on 1/12/2021)     Continuous Blood Gluc Transmit (DEXCOM G6 TRANSMITTER) MISC 1 each every 3 months (Patient not taking: Reported on 1/12/2021)     loperamide (IMODIUM) 2 MG capsule Take 1 capsule (2 mg) by mouth 4 times daily as needed for diarrhea (Patient not taking: Reported on 1/12/2021)     No current facility-administered medications for this visit.             Review of Systems:   As mentioned in the interim history otherwise negative by reviewing constitutional symptoms, central and peripheral neurological systems, respiratory system, cardiac system, GI system,  system, musculoskeletal, skin, allergy, and lymphatics.                  Physical  Exam:   There were no vitals taken for this visit. it was a virtual visit.   He did not cough during this virtual visit, he did not sound short of breath.            Laboratory Data:     Absolute CD4   Date Value Ref Range Status   09/12/2020 359 (L) 441 - 2,156 cells/uL Final       Inflammatory Markers    Recent Labs   Lab Test 07/23/18  0645   CRP 11.0*       Immune Globulin Studies      Recent Labs   Lab Test 09/13/20  0612   *   IGM 29*   *          Metabolic Studies    Recent Labs   Lab Test 01/05/21  0832 10/29/20  0955 09/30/20  0835 09/24/20  0451 09/23/20  0531 09/22/20  0530 09/13/20  0804 09/13/20  0804 09/01/20  0658 09/01/20  0658 06/15/20  0826 06/15/20  0826    137 136 136 135 135   < >  --    < > 134   < > 139   POTASSIUM 4.8 4.1 4.6 5.0 5.0 4.9   < >  --    < > 3.8   < > 4.8   CHLORIDE 108 106 108 109 107 108   < >  --    < > 104   < > 110*   CO2 27 23 20 19* 20 20   < >  --    < > 22   < > 21   ANIONGAP 5 8 8 8 8 8   < >  --    < > 9   < > 8   BUN 48* 44* 40* 35* 35* 33*   < >  --    < > 21   < > 98*   CR 2.01* 1.89* 1.92* 1.70* 1.72* 1.76*   < >  --    < > 1.38*   < > 1.89*   GFRESTIMATED 35* 38* 38* 44* 43* 42*   < >  --    < > 56*   < > 38*   * 157* 172* 107* 180* 160*   < >  --    < > 118*   < > 120*   A1C  --  6.2*  --   --   --   --   --   --   --   --    < > 7.8*   ARLENE 9.4 9.2 9.2 9.0 9.2 8.5   < >  --    < > 8.5   < > 9.0   PHOS 3.0 4.1 4.3 4.1 3.3 3.3   < >  --   --   --    < > 4.4   MAG 1.7 1.5* 1.5* 2.0 2.0 1.8   < >  --    < > 1.4*   < > 1.7   LACT  --   --   --   --   --   --   --  0.7  --  0.9   < >  --    CKT  --  69  --   --   --   --   --   --   --   --   --  132    < > = values in this interval not displayed.       Hepatic Studies    Recent Labs   Lab Test 01/05/21  0832 10/29/20  0955 09/30/20  0835 09/24/20  0451 09/22/20  0530 09/21/20  0527 09/12/20  1204 09/12/20  1204 09/01/20  0658 09/01/20  0658   BILITOTAL 0.6 0.8 0.5 0.3 0.5 0.4   < >   --    < > 0.9   ALKPHOS 85 64 69 75 84 84   < >  --    < > 62   PROTTOTAL 7.5 7.5 7.0 6.0* 6.1* 6.0*   < >  --    < > 6.1*   ALBUMIN 3.8 4.0 3.1* 2.3* 2.3* 2.1*   < >  --    < > 2.4*   AST 12 22 19 30 38 52*   < >  --    < > 16   ALT 19 17 23 46 69 82*   < >  --    < > 11   LDH  --   --   --   --   --   --   --  380*  --  228*    < > = values in this interval not displayed.       Hematology Studies     Recent Labs   Lab Test 01/05/21  0832 10/29/20  0955 09/30/20  0835 09/24/20  0451 09/23/20  0531 09/21/20  0527 09/05/20  0549 09/05/20  0549 09/04/20  0543 09/03/20  0526 09/02/20  0551   WBC 6.2 2.1* 4.4 3.0* 3.7* 3.3*   < > 5.3 3.8* 3.1* 2.8*   ANEU  --  1.0*  --   --   --  2.2  --  4.1 3.1 2.4 1.8   ALYM  --  0.8  --   --   --  0.7*  --  0.8 0.3* 0.3* 0.7*   MARTHA  --  0.2  --   --   --  0.2  --  0.3 0.2 0.2 0.2   AEOS  --  0.0  --   --   --  0.0  --  0.0 0.2 0.1 0.1   HGB 11.1* 10.3* 8.9* 8.2* 9.1* 8.1*   < > 8.3* 8.7* 8.3* 8.1*   HCT 34.2* 32.8* 29.1* 26.8* 30.1* 26.7*   < > 26.5* 27.5* 27.0* 26.7*    225 230 307 345 348   < > 353 376 333 354    < > = values in this interval not displayed.       Clotting Studies    Recent Labs   Lab Test 08/26/20  0939 06/04/20  0900 06/01/20  0856 05/18/20  1835 05/18/20  1630 05/18/20  1248 05/18/20  0010   INR 1.06 1.16* 1.16* 1.54* 2.21* 1.42* 2.19*   PTT  --   --   --  31 31 34 38*       Urine Studies    Recent Labs   Lab Test 09/16/20  1620 09/14/20 2020 08/31/20  1615 08/20/20  1630 08/18/20  1404   URINEPH 5.5 5.5 5.5 5.5 5.5   NITRITE Negative Negative Negative Negative Negative   LEUKEST Trace* Negative Negative Small* Moderate*   WBCU 5 4 1 12* 28*         Microbiology:  Last 6 Culture results with specimen source  Culture Micro   Date Value Ref Range Status   09/17/2020 No Actinomyces species isolated  Final   09/17/2020 Culture negative for acid fast bacilli  Final   09/17/2020   Final    Assayed at [facility] [address] [place] [id number]  [phone]   09/17/2020 Culture negative after 4 weeks  Final   09/17/2020 No Legionella species isolated  Final   09/17/2020 No growth after 4 weeks  Final   09/17/2020 (A)  Final    On day 3, isolated in broth only:  Abiotrophia defectiva  Susceptibility testing not routinely done      Specimen Description   Date Value Ref Range Status   09/17/2020 Lung Right upper lobe Lesion  Final   09/17/2020 Lung Right upper lobe Lesion  Final   09/17/2020 Lung Right upper lobe Lesion  Final   09/17/2020 Lung Right upper lobe Lesion  Final   09/17/2020 Lung Right upper lobe Lesion  Final   09/17/2020 Lung Right upper lobe Lesion  Final   09/17/2020 Lung Right upper lobe Lesion  Final   09/17/2020 Lung Right upper lobe Lesion  Final   09/17/2020 Lung Right upper lobe Lesion  Final        Last check of C difficile  C Diff Toxin B PCR   Date Value Ref Range Status   09/17/2020 Negative NEG^Negative Final     Comment:     Negative: C. difficile target DNA sequences NOT detected, presumed negative   for C.difficile toxin B or the number of bacteria present may be below the   limit of detection for the test.  FDA approved assay performed using [employer] GeneXpert real-time PCR.  A negative result does not exclude actual disease due to C. difficile and may   be due to improper collection, handling and storage of the specimen or the   number of organisms in the specimen is below the detection limit of the assay.           Virology:  CMV viral loads    CMV viral loads    CMV DNA Quant (External)   Date Value Ref Range Status   11/12/2020 Undetected Undetected IU/mL Final     CMV viral loads    Recent Labs   Lab Test 11/12/20  1514 10/29/20  0955 09/13/20  0612   CSPEC  --  Plasma Plasma   CMVLOG  --  Not Calculated Not Calculated   [address] Undetected  --   --        CMV viral loads    Log IU/mL of CMVQNT   Date Value Ref Range Status   10/29/2020 Not Calculated <2.1 [Log_IU]/mL Final   09/13/2020 Not Calculated <2.1 [Log_IU]/mL Final    09/01/2020 Not Calculated <2.1 [Log_IU]/mL Final   08/19/2020 Not Calculated <2.1 [Log_IU]/mL Final   08/13/2020 Not Calculated <2.1 [Log_IU]/mL Final   06/15/2020 Not Calculated <2.1 [Log_IU]/mL Final     CMV DNA Quant (External)   Date Value Ref Range Status   11/12/2020 Undetected Undetected IU/mL Final       CMV resistance testing  No lab results found.  No results found for: CMVCID, CMVFOS, CMVGAN     EBV viral loads     Recent Labs   Lab Test 10/29/20  0955 09/01/20  0658 08/13/20  0840 06/15/20  0826   EBRES EBV DNA Not Detected EBV DNA Not Detected EBV DNA Not Detected EBV DNA Not Detected     EBV DNA Copies/mL   Date Value Ref Range Status   10/29/2020 EBV DNA Not Detected EBVNEG^EBV DNA Not Detected [Copies]/mL Final   09/01/2020 EBV DNA Not Detected EBVNEG^EBV DNA Not Detected [Copies]/mL Final   08/13/2020 EBV DNA Not Detected EBVNEG^EBV DNA Not Detected [Copies]/mL Final   06/15/2020 EBV DNA Not Detected EBVNEG^EBV DNA Not Detected [Copies]/mL Final       Human Herpes Virus 6 viral loads    No results found for: H6RES No results found for: H6SPEC    CMV Antibody IgG   Date Value Ref Range Status   05/18/2020 0.3 0.0 - 0.8 AI Final     Comment:     Negative  Antibody index (AI) values reflect qualitative changes in antibody   concentration that cannot be directly associated with clinical condition or   disease state.       Toxoplasma Antibody IgG   Date Value Ref Range Status   05/18/2020 <3.0 0.0 - 7.1 IU/mL Final     Comment:     Negative- Absence of detectable Toxoplasma gondii IgG antibodies. A negative   result does not rule out acute infection.  The magnitude of the measured result is not indicative of the amount of   antibody present. The concentrations of anti-Toxoplasma gondii IgG in a given   specimen determined with assays from different manufacturers can vary due to   differences in assay methods and reagent specificity.         Pathology:  RUL FNA 9/17/2020  Lung, right upper lobe lesion,  endobronchial ultrasound guided fine needle    aspiration:   - Negative for malignancy   TBBx 9/8/2020  FINAL DIAGNOSIS:   A. No specimen received.   B. LUNG, RIGHT UPPER LOBE, ENDOBRONCHIAL BIOPSIES:   - Two small fragments of respiratory mucosa with chronic inflammation,   frequent eosinophils, submucosal edema   and focal squamous metaplasia.   - Negative for granulomatous inflammation or malignancy in this biopsy,     Imaging:  CT chest 1/5/2021   IMPRESSION:   1.  Continuing decrease in size of right hilar soft tissue attenuating  mass with less pronounced mass effect on the right upper lobe  bronchus, bronchus intermedius and right middle lobe bronchus.  Multiple nonenlarged mediastinal nodes are likely reactive in  etiology. Given that patient is a transplant recipient and chronically  immunosuppressed, following these findings until resolution may be  considered.   2.  Atherosclerosis.  3.  Cholelithiasis.  CT chest 10/29/2020  IMPRESSION:  1. Grossly unchanged right perihilar consolidative mass/soft tissue  thickening with improved mass effect on the right mainstem bronchus  and right upper lobe bronchi. Additionally, there is mildly improved  prominent mediastinal lymph nodes and right hilar lymphadenopathy.  These findings are most consistent with improving  infectious/postinfectious etiology. Recommend follow up to resolution  as malignancy is not entirely excluded.  2. Resolution of previous right lower lobe bronchocentric  reticulonodular opacities.  3. Additional incisional/chronic findings as noted above, not  significantly changed from 10/2/2020.  CT chest 10/2/2020  IMPRESSION:  Abnormalities are stable to slightly improved from  9/21/2020.  CT 9/21/2020 reviewed with the radiologist  Impression: Positive treatment response as evidenced by:  1. Moderate decrease in size of right suprahilar mass. This mass has  aggressive features including mediastinal and bronchial invasion as  well as mediastinal  lymphadenopathy. These invasive features have  improved.  2. Previously occluded right upper lobe bronchi are now patent,  although remains mildly narrowed.  3. Improvement and bronchocentric groundglass and nodular opacities in  the right lower lobe favored to be infectious.  4. Near complete resolution of right-sided pleural effusion with  resolution of intralobular septal thickening in the right lower lobe.  CT chest 9/13/2020  IMPRESSION:  1. Increased size of the right hilar mass/consolidation with  associated narrowing of the subsegmental right upper lobe bronchioles  and multiple scattered pulmonary nodules as above. Differential  continues to include infection versus malignancy with postobstructive  pneumonia.  2. Increased bibasilar groundglass and right lung tree-in-bud nodular  opacities suggestive of infection, possibly secondary to aspiration.  Small right pleural effusion.  3. Surgical changes of cardiac transplantation.  4. Cholelithiasis without cholecystitis.  CT chest 9/2/2020   IMPRESSION:   1. New mass vs consolidation in the right upper lobe and right hilum  since 7/29/2020 with narrowing of multiple right upper lobe bronchi.   1a. Differential pneumonia with reactive right hilar lymphadenopathy  vs. malignancy(ex. Lymphoma/PTLD) with post obstructive pneumonia.  2. Stable additional pulmonary lesions as above.  3. Stable postsurgical changes of heart transplant.  4. Cholelithiasis without evidence of cholecystitis.

## 2021-01-14 DIAGNOSIS — Z94.1 HEART REPLACED BY TRANSPLANT (H): ICD-10-CM

## 2021-01-14 PROCEDURE — 80197 ASSAY OF TACROLIMUS: CPT | Performed by: INTERNAL MEDICINE

## 2021-01-15 ENCOUNTER — HEALTH MAINTENANCE LETTER (OUTPATIENT)
Age: 61
End: 2021-01-15

## 2021-01-18 ENCOUNTER — TELEPHONE (OUTPATIENT)
Dept: FAMILY MEDICINE | Facility: CLINIC | Age: 61
End: 2021-01-18

## 2021-01-18 NOTE — TELEPHONE ENCOUNTER
[facility] Call Center    Phone Message    May a detailed message be left on voicemail: yes     Reason for Call: Other: Mariusz is most concerned about his kidney stones. He declined the first available appointment with any provider who treats stones, and would like to see if there's any way he could get imaging done in [place] where he lives or be seen sooner. Please call Mariusz to discuss .     Action Taken: Other:  Urology    Travel Screening: Not Applicable

## 2021-01-18 NOTE — TELEPHONE ENCOUNTER
LVM for patient to  Schedule a virtual visit with Dr. Gomes on 2/10 or 2/4 with a PSA completed prior. Patient should also have an appointment with Dr. Carlos soon to discuss kidney stones.

## 2021-01-19 LAB
TACROLIMUS BLD-MCNC: 5.3 UG/L (ref 5–15)
TME LAST DOSE: NORMAL H

## 2021-01-20 ENCOUNTER — MYC MEDICAL ADVICE (OUTPATIENT)
Dept: ENDOCRINOLOGY | Facility: CLINIC | Age: 61
End: 2021-01-20

## 2021-01-25 DIAGNOSIS — Z94.1 STATUS POST HEART TRANSPLANTATION (H): Primary | ICD-10-CM

## 2021-01-29 ENCOUNTER — TELEPHONE (OUTPATIENT)
Dept: UROLOGY | Facility: CLINIC | Age: 61
End: 2021-01-29

## 2021-01-29 DIAGNOSIS — N20.0 KIDNEY STONE: Primary | ICD-10-CM

## 2021-01-29 NOTE — TELEPHONE ENCOUNTER
----- Message from ALFONSO Woodard sent at 1/18/2021  8:13 AM CST -----  Hi,    Patient should be scheduled for a virtual visit with Dr. Gomes on 2/10 or 2/4 with a PSA completed prior. Patient should also have an appointment with Dr. Terrance oconnor to discuss kidney stones.     Thanks,  Maurice  ----- Message -----  From: Jeannette Lema CMA  Sent: 1/15/2021  10:41 AM CST  To: ALFONSO Woodard      ----- Message -----  From: Christa Mendez  Sent: 1/15/2021  10:17 AM CST  To: Jeannette Lema CMA    Where can I schedule this patient for a virtual visit to follow up on PSA?

## 2021-01-29 NOTE — TELEPHONE ENCOUNTER
Talked to patient. Scheduled follow up with Dr Gomes for 2/18 for follow up. Patient will get labs done in [facility]. Also scheduled visit with Dr Carlos 3/19.

## 2021-01-29 NOTE — TELEPHONE ENCOUNTER
Niraj Kelly,      Patient is requesting a sooner follow up appointment with CT scan prior. CCT order was place. Please let the urology clinic coordinators know if this is possible -so they can reschedule his 3/19/2021 appointment with Dr. Carlos and schedule his CT with radiology.      Thanks, Phyllis Howard MA

## 2021-01-29 NOTE — TELEPHONE ENCOUNTER
Patient was notified that we faxed his PSA order to [facility] at [phone].      Phyllis Howard MA

## 2021-02-01 NOTE — TELEPHONE ENCOUNTER
I called patient and could not understand the emergency of the issue at all Alessia Levy, JORGE Staff Nurse

## 2021-02-02 ENCOUNTER — DOCUMENTATION ONLY (OUTPATIENT)
Dept: CARE COORDINATION | Facility: CLINIC | Age: 61
End: 2021-02-02

## 2021-02-10 ENCOUNTER — TELEPHONE (OUTPATIENT)
Dept: TRANSPLANT | Facility: CLINIC | Age: 61
End: 2021-02-10

## 2021-02-18 ENCOUNTER — VIRTUAL VISIT (OUTPATIENT)
Dept: UROLOGY | Facility: CLINIC | Age: 61
End: 2021-02-18
Payer: COMMERCIAL

## 2021-02-18 DIAGNOSIS — Z94.1 STATUS POST HEART TRANSPLANTATION (H): Primary | ICD-10-CM

## 2021-02-18 DIAGNOSIS — Z79.01 LONG TERM CURRENT USE OF ANTICOAGULANT: ICD-10-CM

## 2021-02-18 DIAGNOSIS — Z94.1 HEART REPLACED BY TRANSPLANT (H): ICD-10-CM

## 2021-02-18 DIAGNOSIS — Z79.4 TYPE 2 DIABETES MELLITUS WITH HYPERGLYCEMIA, WITH LONG-TERM CURRENT USE OF INSULIN (H): ICD-10-CM

## 2021-02-18 DIAGNOSIS — E11.65 TYPE 2 DIABETES MELLITUS WITH HYPERGLYCEMIA, WITH LONG-TERM CURRENT USE OF INSULIN (H): ICD-10-CM

## 2021-02-18 DIAGNOSIS — N20.0 KIDNEY STONE: ICD-10-CM

## 2021-02-18 DIAGNOSIS — R97.20 ELEVATED PROSTATE SPECIFIC ANTIGEN (PSA): Primary | ICD-10-CM

## 2021-02-18 PROCEDURE — 99213 OFFICE O/P EST LOW 20 MIN: CPT | Mod: 95 | Performed by: UROLOGY

## 2021-02-18 ASSESSMENT — PAIN SCALES - GENERAL: PAINLEVEL: NO PAIN (0)

## 2021-02-18 NOTE — PROGRESS NOTES
Mariusz Sharp is a 60 year old male who is being evaluated via a billable video visit.      How would you like to obtain your AVS? MyChart  If the video visit is dropped, the invitation should be resent by: Text to cell phone: [phone]  Will anyone else be joining your video visit? No    Video Start Time: 2:25 PM    CHIEF COMPLAINT   It was my pleasure to see Mariusz Sharp who is a 60 year old male for follow-up of elevated PSA.      HPI  Mariusz Sharp is a very pleasant 60 year old male who presents with a history of Elevated PSA. Was initially noted to be elevated in May around the time of his heart transplant. Noted to rise further this past fall while hospitalized. He is now home. He was to have a PSA recheck, but this has yet to be completed. MRI was PIRADS 2.  Of note, he has a known large renal stone on the left and is planning to see Dr. Carlos in follow-up in the near future.     MRI Prostate    IMPRESSION:  1. Based on the most suspicious abnormality, this exam is  characterized as PIRADS 2 - Clinically significant cancer is unlikely  to be present.   Benign prostatic hyperplasia changes with estimated  volume of 47 gram.  2. No suspicious adenopathy or evidence of pelvic metastases.     PSA  9/30/2020 - 11.0 (10% free)  8/19/2020 - 10.80  5/18/2020 - 8.21  5/15/2020 - 8.51  3/27/2019 - 3.83         Allergies:   Patient has no known allergies.         Review of Systems:  From intake questionnaire     Skin: negative  Eyes: negative  Ears/Nose/Throat: negative  Respiratory: No shortness of breath, dyspnea on exertion, cough, or hemoptysis  Cardiovascular: No chest pain or palpitations  Gastrointestinal: negative; no nausea/vomiting, constipation or diarrhea  Genitourinary: as per HPI  Musculoskeletal: negative  Neurologic: negative  Psychiatric: negative  Hematologic/Lymphatic/Immunologic: negative  Endocrine: negative         Physical Exam:     Vitals:  No vitals were obtained today due to virtual  visit.    Physical Exam   GENERAL: Healthy, alert and no distress  EYES: Eyes grossly normal to inspection.  No discharge or erythema, or obvious scleral/conjunctival abnormalities.  RESP: No audible wheeze, cough, or visible cyanosis.  No visible retractions or increased work of breathing.    SKIN: Visible skin clear. No significant rash, abnormal pigmentation or lesions.  NEURO: Cranial nerves grossly intact.  Mentation and speech appropriate for age.  PSYCH: Mentation appears normal, affect normal/bright, judgement and insight intact, normal speech and appearance well-groomed.      Outside and Past Medical records:    Review of relevant notes as documented in Epic    Review of the result(s) of each unique test - MRI, PSA, CT         Assessment and Plan:     60 year old male with history of heart transplant in May 2020. During hospitalizations, found to have elevated PSA as high as 11.0 in 9/2020. He has continued to recover with regard to his heart.  Also has large renal stone for which he follows with Dr. Carlos. Regarding his PSA, he should have this rechecked soon, and will consider prostate biopsy pending results. He is also due to follow-up with Dr. Carlos for large left renal stone.     Needs to send PSA from [facility], and/or get it drawn on 3/3 when he's down here for labs  Needs follow-up with Dr. Carlos about left renal stone - if planning procedure, could consider concurrent prostate biopsy.    Video-Visit Details    Type of service:  Video Visit    Video End Time:2:33 PM    Originating Location (pt. Location): Home    Distant Location (provider location):  [facility]OLOG[facility] AND [facility]    Platform used for Video Visit: [employer]    Greater than 20 minutes spent on the date of the encounter including direct interaction with the patient, performing chart review, history and exam, documentation and further activities as noted above.    Donny Gomes,  MD  Urology  [facility]

## 2021-02-22 ENCOUNTER — TELEPHONE (OUTPATIENT)
Dept: TRANSPLANT | Facility: CLINIC | Age: 61
End: 2021-02-22

## 2021-02-24 ENCOUNTER — TELEPHONE (OUTPATIENT)
Dept: TRANSPLANT | Facility: CLINIC | Age: 61
End: 2021-02-24

## 2021-02-24 NOTE — PRE-PROCEDURE
DATE OF SERVICE:  2/24/2021     CHIEF COMPLAINT: Follow up and possible continuation of treatment for relapsed CLL     PRIMARY CARE PHYSICIAN: Dr. Andreina MD     DIAGNOSIS:  1. CLL, relapsed. Involving 70-80% of a hypercellular bone marrow, CD5 positive, BCL1/IGH negative    ONCOLOGY GENOMIC PROFILE:  1. Cytogenetics Studies:  Â· Abnormal Female Karyotype on 8/1/2011: 45,XX,t(2;19)(q33;q13.3),del(3)(?p21p25),t(4;7)(q25;q22),-6,-10,+15,i(17)(q10) [1]/46,XX[19]  Â· Normal Female Karyotype on 01/17/2012, post treatment: 46,XX[20]  2. FISH Studies:  Â· Abnormal FISH on 8/1/2011: nuc talisha (ATMx2,TP53x1)[162/200],(D12Z3,T22F344,LAMP1)X2[200]   Â· Normal FISH on 1/17/2012, post treatment: nuc talisha(VIRIDIANA,D12Z3,F93N080,LAMP1,TP53)X2[200] Â   3. Broad Molecular Profiling -- Not Performed    ORAL CHEMOTHERAPY: Adherence and toxicity reviewed. Taking medication as prescribed. HISTORY OF PRESENT ILLNESS:   1. CLL, diagnosed after patient found to have an elevated white blood cell count. 2. She underwent  bone marrow biopsy on 08/01/2011;  results revealed a CD5 positive, B-cell lymphoma, most consistent with atypical CLL involving 70-80% of a hypercellular bone marrow. There was normal to slightly diminished trilineage hematopoiesis and the specimen was negative for the BCL1/IGH gene rearrangement by FISH, and deletion of Tp53. The cytogenetic panel is associated with poor prognosis in CLL. 3. Cycle 1 day 1 Bendamustine plus Rituxan 9/19/2011; completed therapy on 12/14/2011. 4. Complete response with molecular remission 1/2012.  5. Maintenance Rituxan started 3/2012, completed 1/2014.   6. Underwent surveillance. 7. Patient relapsed on 9/30/2020.   8. Bone marrow biopsy on 10/5/2020 revealed CD5 positive B-cell lymphoma in the peripheral blood, and 30-40% involvement of CLL in the bone marrow. 9. CT CAP on 10/7/2020 showed stable disease compared to 2018.    10. Started treatment with Obinutuzumab on GENERAL PRE-PROCEDURE:   Procedure:  Right heart catheterization, endomyocardial biopsy  Date/Time:  8/13/2020 8:58 AM    Written consent obtained?: Yes    Risks and benefits: Risks, benefits and alternatives were discussed    DC Plan: Appropriate discharge home plan in place for patients who are going home after procedure   Consent given by:  Patient  Patient states understanding of procedure being performed: Yes    Patient's understanding of procedure matches consent: Yes    Procedure consent matches procedure scheduled: Yes    Expected level of sedation:  Moderate  Appropriately NPO:  Yes  ASA Class:  Class 2- mild systemic disease, no acute problems, no functional limitations  Mallampati  :  Grade 2- soft palate, base of uvula, tonsillar pillars, and portion of posterior pharyngeal wall visible  Lungs:  Lungs clear with good breath sounds bilaterally  Heart:  Normal heart sounds and rate  History & Physical reviewed:  History and physical reviewed and no updates needed  Statement of review:  I have reviewed the lab findings, diagnostic data, medications, and the plan for sedation    Roxann Rob MD  Cardiology Fellow   10/27/2020. Patient had an adverse drug reaction with shortness of breath, wheezing, nausea, and head ache, following the initiation of treatment with Obinutuzumab. Infusion was stopped, then resumed and completed without further symptoms. 11. Venetoclax added on cycle 1, Day 22 on 11/19/2020. SUBJECTIVE: Patient presents to the clinic for a follow up and possible continuation of treatment. Patient is doing well today. She missed one dose of venetoclax, as her medication was shipped to [place], [place] on accident. Patient continues to take allopurinol 100 mg BID. She had her second COVID vaccine last week. Patient did not have any side effects from the second dose. Review of systems as noted below.     REVIEW OF SYSTEMS:  Reviewed and verified per nursing assessment as noted below:  Nausea: NO  Vomiting: NO  Fever: NO  Chills: NO  Other signs of infection: NO  Bleeding: NO  Mucositis: NO  Diarrhea: NO  Constipation: NO  Anorexia: NO  Dysuria: NO  Urinary Bleeding: NO  Cough: NO  Shortness of Breath: NO  Fatigue/Weakness: NO  Numbness/Tingling: NO  Other Neuropathies: NO  Edema: NO  Rash: NO  Hand/Foot Syndrome: NO  Anxiety/Depression/Insomnia: YES, all three r/t daughter issues   Pain: NO    PAST MEDICAL HISTORY    Hyperlipidemia                                                Essential (primary) hypertension                              Anxiety                                                       CLL (chronic lymphocytic leukemia) (CMS/[facility])                  Glucose intolerance (impaired glucose toleranc*               Emphysema of lung (CMS/[facility])                                   Major depression in partial remission (CMS/[facility])                SOCIAL HISTORY    Tobacco Use: Yes           Packs/Day: 1     Years: 40         Comment: decreasing; 1-2 cigarettes/day    Alcohol Use: Yes                Comment: social 1-2 drinks per week    ALLERGIES  ALLERGIES:  Chantix [varenicline tartrate], Bupropion, and Enalapril    MEDICATIONS  Current Outpatient Medications   Medication Sig Dispense Refill   â¢ ALPRAZolam (XANAX) 0.25 MG tablet Take 1 tablet by mouth nightly as needed for Sleep. 10 tablet 0   â¢ [START ON 2/26/2021] ALPRAZolam (XANAX) 0.25 MG tablet Take 1 tablet by mouth nightly as needed for Sleep. Do not start before February 26, 2021. 90 tablet 0   â¢ venetoclax (VENCLEXTA) 100 MG tablet Take 4 tablets by mouth daily (with breakfast). 120 tablet 0   â¢ hydrochlorothiazide (HYDRODIURIL) 12.5 MG tablet TAKE 1 TABLET BY MOUTH  DAILY 90 tablet 3   â¢ venlafaxine XR (EFFEXOR XR) 75 MG 24 hr capsule TAKE 1 CAPSULE BY MOUTH  DAILY 90 capsule 3   â¢ atenolol (TENORMIN) 50 MG tablet TAKE ONE-HALF TABLET BY  MOUTH DAILY 45 tablet 3   â¢ atorvastatin (LIPITOR) 80 MG tablet TAKE ONE-HALF TABLET BY  MOUTH DAILY 45 tablet 1   â¢ ezetimibe (ZETIA) 10 MG tablet TAKE ONE-HALF TABLET BY  MOUTH DAILY 45 tablet 1   â¢ allopurinol (ZYLOPRIM) 100 MG tablet Take 1 tablet by mouth 2 times daily. 60 tablet 3   â¢ sulfamethoxazole-trimethoprim (Bactrim) 400-80 MG per tablet Take 1 tablet by mouth daily. 30 tablet 11   â¢ acyclovir (ZOVIRAX) 400 MG tablet Take 1 tablet by mouth 2 times daily. 60 tablet 11   â¢ levothyroxine 50 MCG tablet Take 1 tablet by mouth daily. 30 tablet 5   â¢ fluticasone-umeclidin-vilanterol (Trelegy Ellipta) 100-62.5-25 MCG/INH inhaler Inhale 1 puff into the lungs daily. 30 each 5   â¢ varenicline (CHANTIX) 0.5 MG tablet Take 1 tablet by mouth 2 times daily. 180 tablet 3   â¢ albuterol (VENTOLIN) (2.5 MG/3ML) 0.083% nebulizer solution Take 3 mLs by nebulization every 4 hours as needed for Wheezing. 90 mL 1   â¢ albuterol 108 (90 Base) MCG/ACT inhaler Inhale 2 puffs into the lungs every 4 hours as needed for Shortness of Breath or Wheezing. 8.5 g 5   â¢ meloxicam (MOBIC) 15 MG tablet TAKE 1 TABLET BY MOUTH  DAILY 90 tablet 3   â¢ fluconazole (DIFLUCAN) 200 MG tablet Take 0.5 tablets by mouth daily.  15 tablet 5   â¢ "prochlorperazine (COMPAZINE) 10 MG tablet Take 1 tablet by mouth every 6 hours as needed for Nausea or Vomiting. 30 tablet 5   â¢ varenicline (CHANTIX) 0.5 MG tablet Take 1 tablet by mouth 2 times daily. Daily x 1 week and then BID (Patient taking differently: Take 0.5 mg by mouth daily. Daily - Original scrip is for BID; but patient states taking daily) 60 tablet 0     No current facility-administered medications for this visit. PHYSICAL EXAMINATION  Vitals:    02/24/21 1042 02/24/21 1052   BP: (!) 150/73 (!) 147/72   BP Location: LUE - Left upper extremity    Patient Position: Sitting    Cuff Size: Regular    Pulse: 73    Resp: 18    Temp: 98 Â°F (36.7 Â°C)    TempSrc: Oral    SpO2: 96%    Weight: 85.2 kg    Height: 5' 4"" (1.626 m)    PainSc: 7-8    PainLoc: Back    ECOG PFS of 1  Patient is alert and oriented to time place and person, in no acute distress. Cranium: Normocephalic, atraumatic. No conjunctival pallor or icterus. KRUNAL,   Psychiatric: Normal mood and affect. There is good understanding of the conversation and asks relevant questions. Full physical examination was not performed today due to the counseling nature of this visit.     LABORATORY DATA  WBC (K/mcL)   Date Value   02/24/2021 3.9 (L)   09/30/2019 5.7     RBC (mil/mcL)   Date Value   02/24/2021 3.50 (L)   09/30/2019 3.76 (L)     HCT (%)   Date Value   02/24/2021 33.1 (L)   09/30/2019 36.4     HGB (g/dL)   Date Value   02/24/2021 11.5 (L)   09/30/2019 12.3     PLT (K/mcL)   Date Value   02/24/2021 147   09/30/2019 153   01/15/2014 169       Sodium (mmol/L)   Date Value   02/24/2021 138   09/30/2019 140     Potassium (mmol/L)   Date Value   02/24/2021 4.1   09/30/2019 4.0     Chloride (mmol/L)   Date Value   02/24/2021 105   09/30/2019 105     Glucose (mg/dL)   Date Value   02/24/2021 265 (H)   09/30/2019 136 (H)     CALCIUM (mg/dL)   Date Value   09/30/2019 8.9     Calcium (mg/dL)   Date Value   02/24/2021 8.8     Carbon Dioxide (mmol/L) " Date Value   02/24/2021 23   09/30/2019 28     BUN (mg/dL)   Date Value   02/24/2021 31 (H)   09/30/2019 27 (H)     Creatinine (mg/dL)   Date Value   02/24/2021 1.30 (H)   09/30/2019 1.07 (H)     ALK PHOSPHATASE (Units/L)   Date Value   09/30/2019 66     Alkaline Phosphatase (Units/L)   Date Value   02/24/2021 116     AST/SGOT (Units/L)   Date Value   09/30/2019 18     GOT/AST (Units/L)   Date Value   02/24/2021 24     ALT/SGPT (Units/L)   Date Value   09/30/2019 24     GPT/ALT (Units/L)   Date Value   02/24/2021 46       IMAGING  No results found. ASSESSMENT   1. CLL, undergoing surveillance. Status post treatment with Bendamustine plus Rituxan from 9/19/2011 - 12/14/2011. CR with molecular remission since 1/2012. Status post maintenance Rituxan from 3/2012 - 1/2014. Patient had completed 8.5 years on surveillance without evidence of recurrence. There was laboratory evidence of recurrence on 9/30/2020. Bone marrow biopsy on 10/5/2020 revealed CD5 positive B-cell lymphoma in the peripheral blood, and 30-40% involvement of CLL in the bone marrow. CT CAP on 10/7/2020 was negative for lymphadenopathy. Discussed treatment options including Ibrutinib versus Obinutuzumab with Venetoclax versus possible clinical trial with all three. Started treatment with Obinutuzumab and Venetoclax on 10/27/2020. Patient had an adverse drug reaction following the initiation of treatment with Obinutuzumab. Infusion was stopped, then resumed and completed without further symptoms. Patient currently tolerating treatment well. Proceed with Cycle 5, Day 1 today with Venetoclax 400 mg.   2. Active Smoking, currently smoking 1-2 cigarettes per day. Patient is taking Chantix. Recommended abstinence. 3. Elevated Uric Acid. Currently allopurinol 100 mg BID. Uric acid slightly elevated today, at 6.7. Will continue to monitor. 4. Elevated Creatinine, likely due to #3. US kidney on 10/5/2020 revealed no acute process or hydronephrosis. Creatinine stable. Recommended adequate intake of fluids at home. 5. ID prophylaxis. Currently on Acyclovir and Bactrim DS. 6. Seborrheic Dermatitis. Currently using anti-dandruff shampoo on her scalp and Hydrocortisone Cream neck. Continued. Recommended Benadryl or hydroxyzine for itching. Referral to dermatology was recommended, however, patient declined at this time. Improved. PLAN  1. On treatment with Obinutuzumab and Venetoclax: Cycle 5, Day 1 today    2. Supportive Care     I reviewed lab results with patient. Labs were overall stable and adequate for treatment. Proceed with cycle 5 of Obinutuzumab today. Advised patient to continue Venetoclax 400 mg po daily. Patient has one more planned treatment with Obinutuzumab. Rationale for proposed plan of care discussed. Patient stated understanding and agreement. Questions were answered. Follow-up was arranged. Treatment orders completed, medication reconciliation performed, and doses reviewed with treatment nurse and pharmacist.    FOLLOW UP  1. Obinutuzumab today  2. Continue venetoclax  3. MD/ML, lab (cbc, cmp, uric acid, mag) and fluids in 1 week    This chart was documented by Ivonne Car, acting as scribe for Saeid Hutchins MD. 2/24/2021, 10:07 AM.  The documentation recorded by the scribe accurately and completely reflects the service(s) I personally performed and the decisions made by me.

## 2021-02-25 ENCOUNTER — PRE VISIT (OUTPATIENT)
Dept: TRANSPLANT | Facility: CLINIC | Age: 61
End: 2021-02-25

## 2021-02-25 DIAGNOSIS — Z94.1 STATUS POST HEART TRANSPLANTATION (H): Primary | ICD-10-CM

## 2021-03-01 ENCOUNTER — TELEPHONE (OUTPATIENT)
Dept: ENDOCRINOLOGY | Facility: CLINIC | Age: 61
End: 2021-03-01

## 2021-03-01 NOTE — PROGRESS NOTES
"ADULT HEART TRANSPLANT CLINIC  March 3, 2021      HPI:    \"Vasyl" Aron is a 60yr old male with a history of CAD (STEMI with ALYSSA to LAD 6/27/18), ICM (LVEF 20-25%) s/p HM3 LVAD (12/31/18), monomorphic VT (s/p ICD with shocks), LV thrombus, CKD, elevated PSA, pAF, HTN, HL, and DMII, now s/p OHT 5/18/20, who presents to clinic for ten-month routine follow up.    His post-transplant course was c/b NSVT (with no hemodynamic compromise), leukocytosis with C Acnes growing from his former LVAD site (thought to be a contaminant, but treated with IV vanco --> po doxy through 6/1/20, 14d course total), and in the setting of donor blood growing S anginosus and Veillonella (also thought to be a contaminant, but treated with Vanco/zosyn --> Vanco/Flagyl for a total of 7 days).  He also had hyperkalemia, which improved following kayexalate and stopping bactrim.  He ultimately discharged on 5/29/20.    He was readmitted 8/31-9/24 with fever, cough, diarrhea, and syncope.  He was found to have a post-obstructive vs aspiration pneumonia, RUL/suprahilar mass, and narrowing of multiple RUL bronchi.  He was initially treated as a fungal infection as his fungitell was +ve, but he did not clinically improve, nor did the size of the mass change.  Ultimately, tissue culture from the RUL mass showed abiotrophia defectiva (oral microbe), which was thought to be a contaminant, but did respond to antibiotics.  His MMF was decreased to 500mg twice daily, as ImmuKnow was noted to be low.    His biopsies have remained negative for rejection, and last AlloMap score from 10/2020 was 34.  He has no DSAs.  Baseline coronary angiogram was deferred.  Last TTE 8/2020 showed stable graft function, with LVEF > 70% and normal RV size/function.  Last RHC 10/2020 showed normal biventricular filling pressures, with RA 5, mPA 20, PCW 15, and CI 2.9.      The patient was last seen in clinic on Jan 5th, 2021. Since his last visit, he states that he feels " relatively well. His RADER and cough have been improving with progressive resolution of the pneumonia. He reports improvement in endurance since last visit, but still feels poorer than immediately after transplant (pre-pneumonia). He still reports tiredness during the day, requiring 1-2 naps every few hours. Reports snoring at night and awakens every 2-3 hours with a cumulative sleep of only about 5 hours at best at night.     He denies s/s of fluid overload, denies   has had issues with sleep since transplant, but notes that he is able to lie flat without PND and orthopnea.  His appetite has been good, and he denies nausea, vomiting, diarrhea, and constipation. He notes that his insurance has been charging a high price for novolog and his diabetes team is working on alternatives.  He otherwise denies chest pain, palpitations, dizziness, falls, headaches, acute vision changes, fevers, sore throat, and signs of bleeding.      Serostatus:  CMV D+/R-  EBV D-/R+  Toxo D-/R-    Patient Active Problem List    Diagnosis Date Noted     Elevated prostate specific antigen (PSA) 01/07/2021     Priority: Medium     Acute kidney failure, unspecified (H) 09/18/2020     Priority: Medium     Fever 08/31/2020     Priority: Medium     Lung mass 08/31/2020     Priority: Medium     Added automatically from request for surgery [id number]       Kidney stone 08/28/2020     Priority: Medium     Rhinovirus infection 08/20/2020     Priority: Medium     Chronic prostatitis without hematuria 08/19/2020     Priority: Medium     Prophylactic antibiotic 08/19/2020     Priority: Medium     Pneumonia 08/18/2020     Priority: Medium     Heart replaced by transplant (H) 05/26/2020     Priority: Medium     Added automatically from request for surgery [id number]       Biventricular heart failure (H) 05/17/2020     Priority: Medium     Added automatically from request for surgery [id number]       Status post heart transplantation (H) 05/17/2020     Priority:  Medium     Added automatically from request for surgery [id number]       Hydronephrosis 05/12/2020     Priority: Medium     Chronic systolic congestive heart failure (H) 02/13/2019     Priority: Medium     Added automatically from request for surgery [id number]       Weakness 01/11/2019     Priority: Medium     Type 2 diabetes mellitus with hyperglycemia, with long-term current use of insulin (H) 07/20/2018     Priority: Medium     Left ventricular apical thrombus following MI (H) 07/06/2018     Priority: Medium     Hematuria 07/04/2018     Priority: Medium     Long term current use of anticoagulant 07/03/2018     Priority: Medium     Hypotension, unspecified hypotension type 07/02/2018     Priority: Medium     Monomorphic ventricular tachycardia (H) 07/02/2018     Priority: Medium     Overview:   Added automatically from request for surgery [id number]       ASCVD (arteriosclerotic cardiovascular disease) 06/27/2018     Priority: Medium     CKD (chronic kidney disease) stage 3, GFR 30-59 ml/min 06/27/2018     Priority: Medium     Dyslipidemia 06/27/2018     Priority: Medium     ST elevation myocardial infarction involving left anterior descending (LAD) coronary artery (H) 06/27/2018     Priority: Medium     Type 2 diabetes mellitus with hyperglycemia (H) 06/27/2018     Priority: Medium     ANDREW (acute kidney injury) (H) 10/06/2016     Priority: Medium     Ureteral obstruction 10/06/2016     Priority: Medium     Unilateral inguinal hernia 09/09/2009     Priority: Medium     Overview:   IMO Update 10/11         PAST MEDICAL HISTORY:  Past Medical History:   Diagnosis Date     Acute kidney injury (H)      CKD (chronic kidney disease)      Coronary artery disease      Diabetes mellitus (H)      HTN (hypertension)      Hyperlipidemia      ICD (implantable cardioverter-defibrillator), single chamber- NOT dependent 7/17/2018     Ischemic cardiomyopathy      LV (left ventricular) mural thrombus      Paroxysmal atrial flutter (H)       RVF (right ventricular failure) (H)      Systolic heart failure (H)      Ventricular tachycardia (H)        CURRENT MEDICATIONS:  Prescription Medications as of 3/5/2021       Rx Number Disp Refills Start End Last Dispensed Date Next Fill Date Owning Pharmacy    ACCU-CHEK [place] PLUS test strip  300 strip 0 2020    [address] [phone]    Sig: Check BG 3 times daily at meals and at bedtime.    Class: E-Prescribe    acetaminophen (TYLENOL) 325 MG tablet     2020        Sig: Take 2 tablets (650 mg) by mouth every 4 hours as needed for other (multimodal surgical pain management along with NSAIDS and opioid medication as indicated based on pain control and physical function.)    Class: OTC    Route: Oral    amLODIPine (NORVASC) 5 MG tablet  90 tablet 3 2020    [facility] [address]    Sig: Take 1 tablet (5 mg) by mouth daily    Class: E-Prescribe    Route: Oral    BD SILVANA U/F 32G X 4 MM insulin pen needle  600 each 3 10/9/2020    [facility] [address]    Sig: Use 6 times daily.    Class: E-Prescribe    calcium carbonate 600 mg-vitamin D 400 units (CALTRATE) 600-400 MG-UNIT per tablet     2020        Sig: Take 1 tablet by mouth 2 times daily (with meals)    Class: OTC    Route: Oral    Renewals     Renewal requests to authorizing provider (Damari Ohara, CARMEN MARKHAM) <b>prohibited</b>          Continuous Blood Gluc  (DEXCOM G6 ) DON  1 Device 1 2020    [facility] [address]    Si each daily    Class: E-Prescribe    Route: Does not apply    Prior authorization: Closed - Prior Authorization not required for patient/medication    Continuous Blood Gluc Sensor (DEXCOM G6 SENSOR) MISC  9 each 3 2020    [facility] [address]     Si each every 10 days    Class: E-Prescribe    Route: Does not apply    Prior authorization: Closed - Prior Authorization not required for patient/medication    Continuous Blood Gluc Transmit (DEXCOM G6 TRANSMITTER) MISC  1 each 3 2020    [facility] [place] [address] [address]     Si each every 3 months    Class: E-Prescribe    Route: Does not apply    Prior authorization: Closed - Prior Authorization not required for patient/medication    everolimus (ZORTRESS) 0.5 MG tablet  120 tablet 11 3/3/2021    [id number] - [place] [id number] [facility] [address]    Sig: Take 2 tablets (1 mg) by mouth 2 times daily    Class: E-Prescribe    Notes to Pharmacy: TXP DT 2020 (Heart) TXP Dischg DT 2020 DX Heart transplant Z94.1 TX [facility] ([place], [place])  *TEST CLAIM*    Route: Oral    hydrALAZINE (APRESOLINE) 50 MG tablet  540 tablet 3 10/13/2020    [facility] [place] [address] [address]     Sig: Take 2 tablets (100 mg) by mouth 3 times daily    Class: E-Prescribe    Route: Oral    insulin aspart (NOVOLOG PEN) 100 UNIT/ML pen  3 mL 0 10/27/2020    [facility] [place] [address] [address]    Sig: Inject 1-14 units per custom scale  For Pre-Meal BG less than 140, no additional insulin needed   to 159 give 1 units.    to 179 give 2 units.    to 199 give 3 units.    to 219 give 4 units.    to 239 give 5 units.    to 259 give 6 units.    to 279 give 7 units.    to 299 give 8 units.    to 319 give 9 units.    to 339 give 10 units.    to 359 give 11 units.    to 379 give 12 units.   to 399 give 13 units.  BG >/=400 give 14 units.    Class: Local Print    insulin aspart (NOVOLOG PEN) 100 UNIT/ML pen  3 mL 0 2020    [facility] [place], [place] - [address] [facility]     Sig: Inject 1-11 Units Subcutaneous At Bedtime Inject 1-11 units at bedtime per custom scale   For Bedtime BG less than 200, no additional insulin needed   to 219 give 1 units.    to 239 give 2 units.    to 259 give 3 units.    to 279 give 4 units.    to 299 give 5 units.    to 319 give 6 units.    to 339 give 7 units.    to 359 give 8 units    to 379 give 9 units.   to 399 give 10 units.  BG >/=400 give 11 units.    Class: Historical    Route: Subcutaneous    insulin glargine (LANTUS PEN) 100 UNIT/ML pen  15 mL 3 1/6/2021    [facility] - [address]    Sig: Inject 28 units subcutaneous at .    Class: E-Prescribe    Notes to Pharmacy: If Lantus is not covered by insurance, may substitute Basaglar at same dose and frequency.      loperamide (IMODIUM) 2 MG capsule  60 capsule 0 9/24/2020    [facility] - [place], [place] - [address] [facility] [address]    Sig: Take 1 capsule (2 mg) by mouth 4 times daily as needed for diarrhea    Class: E-Prescribe    Route: Oral    magnesium oxide (MAG-OX) 400 (241.3 Mg) MG tablet  90 tablet 3 7/15/2020    [facility] [address]    Sig: Take 1 tablet (400 mg) by mouth 2 times daily    Class: E-Prescribe    Route: Oral    multivitamin w/minerals (THERA-VIT-M) tablet  90 tablet 3 9/24/2020    [facility] [place], [place] - [address] [place] [address] [address]    Sig: Take 1 tablet by mouth daily    Class: E-Prescribe    Route: Oral    mycophenolate (GENERIC EQUIVALENT) 250 MG capsule  240 capsule 11 3/3/2021    [id number] - [facility] [address] AT [facility] [address]    Sig: Take 4 capsules (1,000 mg) by mouth 2 times daily    Class: E-Prescribe    Notes to Pharmacy: TXP DT 5/18/2020 (Heart) TXP Dischg DT 5/29/2020 DX Heart transplant Z94.1 TX Center [facility]  "([place], [place])    Route: Oral    NOVOLOG FLEXPEN 100 UNIT/ML soln  15 mL 3 11/27/2020    [facility] - [place] [address] [address]     Sig: INJECT 1 TO 14 UNITS UNDER THE SKIN  WITH MEALS AND 1 TO 11 UNITS AT BEDTIME PER SLIDING SCALES, AND INJECT 1 UNIT UNDER THE SKIN PER 5 GRAMS OF CARBOHYDRATES WITH MEALS AND SNACKS    Class: E-Prescribe    pantoprazole (PROTONIX) 40 MG EC tablet  90 tablet 3 9/28/2020    [facility] [place] [address] [address]    Sig: Take 1 tablet (40 mg) by mouth every morning (before breakfast)    Class: E-Prescribe    Route: Oral    rosuvastatin (CRESTOR) 10 MG tablet  90 tablet 3 7/27/2020    [facility] [place] [address] [address]     Sig: TAKE ONE TABLET BY MOUTH ONCE DAILY    Class: E-Prescribe    tacrolimus (GENERIC EQUIVALENT) 1 MG capsule  240 capsule 3 1/5/2021    [facility] #[id number] [address] AT [facility] OF [facility] [address]    Sig: Take 4mg (4 caps) in the AM and 4mg (4 caps) in the PM.    Class: E-Prescribe    Notes to Pharmacy: TXP DT 5/18/2020 (Heart) TXP Dischg DT 5/29/2020 DX Heart transplant Z94.1 TX Center [facility] ([place], [place])          ROS:   Constitutional: No fever, chills, or sweats. Weight gain.   ENT: No visual disturbance, ear ache, epistaxis, sore throat.   Allergies/Immunologic: Negative.   Respiratory: As per HPI.  Cardiovascular: As per HPI.   GI: No nausea, vomiting, hematemesis, melena, or hematochezia.   : No urinary frequency, dysuria, or hematuria.   Integument: Negative.   Psychiatric: Negative.   Neuro: Negative.   Endocrinology: As per HPI.  Musculoskeletal: As per HPI.    Exam:  /88 (BP Location: Right arm, Patient Position: Chair, Cuff Size: Adult Large)   Pulse 109   Ht 1.626 m (5' 4\")   Wt 90.7 kg (200 lb)   SpO2 99%   BMI 34.33 kg/m    In general, the patient is a pleasant male in no apparent distress. "    HEENT: NC/AT. PERRLA. EOMI.  Sclerae white, not injected.    Neck:  No adenopathy, No thyromegaly.    COR: No jugular venous distention when sitting upright.  RRR.  Normal S1 S2 splits physiologically.  No murmur, rub click, or gallop.    Lungs:  CTA. No rhonchi.    Abdomen: soft, nontender, nondistended.  No organomegaly.  Extremities:  No clubbing or cyanosis, trace bilateral LE edema at ankles  Neuro: Alert & Oriented x 3, grossly non focal.  Integument: no open lesions, rashes, or jaundice.    Labs:  CBC RESULTS:   Lab Results   Component Value Date    WBC 6.4 03/03/2021    RBC 3.99 (L) 03/03/2021    HGB 10.7 (L) 03/03/2021    HCT 32.9 (L) 03/03/2021    MCV 83 03/03/2021    MCH 26.8 03/03/2021    MCHC 32.5 03/03/2021    RDW 13.8 03/03/2021     03/03/2021       BMP RESULTS:  Lab Results   Component Value Date     03/03/2021    POTASSIUM 4.0 03/03/2021    CHLORIDE 107 03/03/2021    CO2 23 03/03/2021    ANIONGAP 8 03/03/2021     (H) 03/03/2021    BUN 52 (H) 03/03/2021    CR 2.09 (H) 03/03/2021    GFRESTIMATED 34 (L) 03/03/2021    GFRESTBLACK 39 (L) 03/03/2021    ARLENE 9.3 03/03/2021      LIPID RESULTS:  Lab Results   Component Value Date    CHOL 161 10/29/2020    HDL 51 10/29/2020    LDL 81 10/29/2020    TRIG 144 10/29/2020    NHDL 110 10/29/2020       IMMUNOSUPPRESSANT LEVELS  Lab Results   Component Value Date    TACROL 5.0 03/03/2021    DOSTAC 03/02/21  2100 03/03/2021       No components found for: CK  Lab Results   Component Value Date    MAG 1.7 03/03/2021     Lab Results   Component Value Date    A1C 10.0 (H) 03/03/2021     Lab Results   Component Value Date    PHOS 3.8 03/03/2021     Lab Results   Component Value Date    NTBNPI 1,673 (H) 08/18/2020     Lab Results   Component Value Date    SA[place]STMET SA FCS 10/29/2020    SAICELL Class I 10/29/2020    [id number] Cw:15 10/29/2020    [id number] Cw:18 10/29/2020    SAIREPCOM  10/29/2020      Test performed by modified procedure. Serum heat  "inactivated and tested   by a modified ([place]) protocol including fetal calf serum addition.   High-risk, mfi >3,000. Mod-risk, mfi 500-3,000.       Lab Results   Component Value Date    SAIITESTME SA FCS 10/29/2020    SAIICELL Class II 10/29/2020    [id number] None 10/29/2020    [id number] DR:10 10/29/2020    SAIIREPCOM  10/29/2020      Test performed by modified procedure. Serum heat inactivated and tested   by a modified ([place]) protocol including fetal calf serum addition.   High-risk, mfi >3,000. Mod-risk, mfi 500-3,000.       Lab Results   Component Value Date    CSPEC Plasma 10/29/2020       Assessment and Plan:   \"Anju Sharp is a 60yr old male with a history of CAD (STEMI with ALYSSA to LAD 6/27/18), ICM (LVEF 20-25%) s/p HM3 LVAD (12/31/18), monomorphic VT (s/p ICD with shocks), LV thrombus, CKD, elevated PSA, pAF, HTN, HL, and DMII, now s/p OHT 5/18/20, who presents to clinic for routine follow up.    ICM, s/p OHT 5/18/20  His post-transplant course was c/b NSVT (with no hemodynamic compromise), leukocytosis with C Acnes growing from his former LVAD site (thought to be a contaminant, but treated with IV vanco --> po doxy through 6/1/20, 14d course total), and in the setting of donor blood growing S anginosus and Veillonella (also thought to be a contaminant, but treated with Vanco/zosyn --> Vanco/Flagyl for a total of 7 days). He ultimately discharged 5/29. He was readmitted 8/31-9/24 with fever, cough, diarrhea, and syncope.  He was initially treated as a fungal infection as his fungitell was +ve, but he did not clinically improve, nor did the size of the mass change.  Ultimately, tissue culture from the RUL mass showed abiotrophia defectiva (oral microbe), which was thought to be a contaminant, but did respond to antibiotics. He's currently off anti-microbials and recovering from a pneumonia perspective.     His biopsies have remained negative for rejection, and last AlloMap score from 10/2020 was 34.  " He has no DSAs.  Baseline coronary angiogram has been deferred. Labs today show stable electrolytes. Creatinine 2.01 mg/dL (baseline). LFTs normal. CBC shows stable blood counts. Tacro level in process. RHC in January 2021 showed normal biventricular filling pressures, with RA 4, mPA 14, PCW 11, and CI 2.91.    Surveillance TTE today showed stable graft function, with LVEF > 65% and normal RV size/function.    Mr. Sharp appears very well today. His predominant complaint during the visit it that of fatigue, which is likely multifactorial secondary to poor sleep hygiene w/ multiple nocturnal awakenings, untreated obstructive sleep apnea, lack of exercise and the otherwise uncooperative weather keeping him from outdoor activity. Will check thyroid function, other labs stable.      Serostatus:  - CMV D+/R-  - EBV D-/R+  - Toxo D-/R-    Immunosuppression:  - Increase MMF to 1000 mg BID  - tacro, goal level 6-8.  Level today pending, will readjust as required.   - Will transition patient from Tac to Everolimus per protocol in April.     PPx:  - CAV: Not on aspirin. Continue rosuvastatin 10mg daily.  - GI:  Pantoprazole 40mg daily  - Osteoporosis:  Calcium/vitamin d supplements    Graft function:  - BPs:  Controlled, continue amlodipine 5mg daily and hydralazine 100mg TID  - fluid status:  Euvolemic, not on diuretics    Health maintenance:  - needs shingrex, needs to determine if insurance will cover    Post-obstructive versus aspiration pneumonia  RUL/suprahilar mass   +abiotrophia defectiva  Followed by ID, given the improving symptoms with no fever and almost resolution of the cough, recommend no utility of repeat CT unless symptoms worsen.     Leukopenia, resolved  Increasing MMF as noted above, and will trend CBC.    DMII  BGs slightly elevated, following with endocrinology.   Consider initiation of SGLT2 inhibitor if approved by insurance.     Elevated PSA  Prostate MRI 5/2020 showed signs of BPH. He was referred to  urology, who recommended rechecking PSA, will consider biopsy if PSA persistently elevated.        The above was reviewed with  Aron, who verbalized understanding and will call with further questions/concerns.  Case d/w staff physician, Dr Ortiz, who's in agreement with the plan above.     Joselyn Chase MD,   Cardiovascular Disease Fellow  Pager [phone]        I have reviewed today's vital signs, notes, medications, labs and imaging. I have also seen and examined the patient and agree with the findings and plan as outlined above.    Jenni Ortiz MD  Section Head - Advanced Heart Failure, Transplantation and Mechanical Circulatory Support  Director - Adult Congenital and Cardiovascular Genetics Center  Associate Professor of Medicine, [facility]    I spent 30 minutes in care of the patient today including reviewing today's labs examination and discussion of testing results and care recommendations with patient and documentation.

## 2021-03-01 NOTE — TELEPHONE ENCOUNTER
CLINIC COORDINATOR SCHEDULING NOTES      CALL RESULT: LVM     APPT TYPE: VIDEO VISIT RETURN     PROVIDER: Bennett Schafer      DATE APPT NEEDED: Next available      ADDITIONAL NOTES:  BGs have been elevated, high 200s    ----- Message from Jeannette Schafer PA-C sent at 3/1/2021 11:21 AM CST -----  Regarding: RE: Follow up  Please schedule pt with me.  Thanks bennett   ----- Message -----  From: Angelika Muller RN  Sent: 3/1/2021   8:51 AM CST  To: Jeannette Schafer PA-C  Subject: Follow up                                        Hello-    I am Red's heart txp coordinator.  Red's BGs have been elevated on his last few lab draws- high 200s.  He was supposed to see you in March, but I don't see that he was ever scheduled.  Could you set Red up for an appointment with you to review BG control?  Thank You!  Angelika Muller, GERMAN  Post-Heart Transplant Coordinator

## 2021-03-03 ENCOUNTER — ANCILLARY PROCEDURE (OUTPATIENT)
Dept: CARDIOLOGY | Facility: CLINIC | Age: 61
End: 2021-03-03
Attending: INTERNAL MEDICINE

## 2021-03-03 ENCOUNTER — OFFICE VISIT (OUTPATIENT)
Dept: CARDIOLOGY | Facility: CLINIC | Age: 61
End: 2021-03-03
Attending: INTERNAL MEDICINE
Payer: COMMERCIAL

## 2021-03-03 VITALS
HEIGHT: 64 IN | DIASTOLIC BLOOD PRESSURE: 88 MMHG | OXYGEN SATURATION: 99 % | BODY MASS INDEX: 34.15 KG/M2 | HEART RATE: 109 BPM | WEIGHT: 200 LBS | SYSTOLIC BLOOD PRESSURE: 123 MMHG

## 2021-03-03 DIAGNOSIS — Z94.1 STATUS POST HEART TRANSPLANTATION (H): ICD-10-CM

## 2021-03-03 DIAGNOSIS — Z13.29 THYROID DISORDER SCREENING: ICD-10-CM

## 2021-03-03 DIAGNOSIS — Z94.1 STATUS POST HEART TRANSPLANTATION (H): Primary | ICD-10-CM

## 2021-03-03 DIAGNOSIS — E11.65 TYPE 2 DIABETES MELLITUS WITH HYPERGLYCEMIA, WITH LONG-TERM CURRENT USE OF INSULIN (H): ICD-10-CM

## 2021-03-03 DIAGNOSIS — Z79.4 TYPE 2 DIABETES MELLITUS WITH HYPERGLYCEMIA, WITH LONG-TERM CURRENT USE OF INSULIN (H): ICD-10-CM

## 2021-03-03 LAB
ALBUMIN UR-MCNC: NEGATIVE MG/DL
ANION GAP SERPL CALCULATED.3IONS-SCNC: 8 MMOL/L (ref 3–14)
APPEARANCE UR: CLEAR
BILIRUB UR QL STRIP: NEGATIVE
BUN SERPL-MCNC: 52 MG/DL (ref 7–30)
CALCIUM SERPL-MCNC: 9.3 MG/DL (ref 8.5–10.1)
CHLORIDE SERPL-SCNC: 107 MMOL/L (ref 94–109)
CO2 SERPL-SCNC: 23 MMOL/L (ref 20–32)
COLOR UR AUTO: YELLOW
CREAT SERPL-MCNC: 2.09 MG/DL (ref 0.66–1.25)
ERYTHROCYTE [DISTWIDTH] IN BLOOD BY AUTOMATED COUNT: 13.8 % (ref 10–15)
GFR SERPL CREATININE-BSD FRML MDRD: 34 ML/MIN/{1.73_M2}
GLUCOSE SERPL-MCNC: 234 MG/DL (ref 70–99)
GLUCOSE UR STRIP-MCNC: 150 MG/DL
HBA1C MFR BLD: 10 % (ref 0–5.6)
HCT VFR BLD AUTO: 32.9 % (ref 40–53)
HGB BLD-MCNC: 10.7 G/DL (ref 13.3–17.7)
HGB UR QL STRIP: NEGATIVE
KETONES UR STRIP-MCNC: NEGATIVE MG/DL
LEUKOCYTE ESTERASE UR QL STRIP: NEGATIVE
MAGNESIUM SERPL-MCNC: 1.7 MG/DL (ref 1.6–2.3)
MCH RBC QN AUTO: 26.8 PG (ref 26.5–33)
MCHC RBC AUTO-ENTMCNC: 32.5 G/DL (ref 31.5–36.5)
MCV RBC AUTO: 83 FL (ref 78–100)
NITRATE UR QL: NEGATIVE
PH UR STRIP: 5 PH (ref 5–7)
PHOSPHATE SERPL-MCNC: 3.8 MG/DL (ref 2.5–4.5)
PLATELET # BLD AUTO: 258 10E9/L (ref 150–450)
POTASSIUM SERPL-SCNC: 4 MMOL/L (ref 3.4–5.3)
RBC # BLD AUTO: 3.99 10E12/L (ref 4.4–5.9)
SODIUM SERPL-SCNC: 138 MMOL/L (ref 133–144)
SOURCE: ABNORMAL
SP GR UR STRIP: 1.02 (ref 1–1.03)
T4 FREE SERPL-MCNC: 1.08 NG/DL (ref 0.76–1.46)
TACROLIMUS BLD-MCNC: 5 UG/L (ref 5–15)
TME LAST DOSE: NORMAL H
TSH SERPL DL<=0.005 MIU/L-ACNC: 1.87 MU/L (ref 0.4–4)
UROBILINOGEN UR STRIP-MCNC: 0 MG/DL (ref 0–2)
WBC # BLD AUTO: 6.4 10E9/L (ref 4–11)

## 2021-03-03 PROCEDURE — G0463 HOSPITAL OUTPT CLINIC VISIT: HCPCS

## 2021-03-03 PROCEDURE — 36415 COLL VENOUS BLD VENIPUNCTURE: CPT | Performed by: INTERNAL MEDICINE

## 2021-03-03 PROCEDURE — 85027 COMPLETE CBC AUTOMATED: CPT | Performed by: PATHOLOGY

## 2021-03-03 PROCEDURE — 84439 ASSAY OF FREE THYROXINE: CPT | Performed by: PATHOLOGY

## 2021-03-03 PROCEDURE — 83735 ASSAY OF MAGNESIUM: CPT | Performed by: PATHOLOGY

## 2021-03-03 PROCEDURE — 93306 TTE W/DOPPLER COMPLETE: CPT | Performed by: INTERNAL MEDICINE

## 2021-03-03 PROCEDURE — 84443 ASSAY THYROID STIM HORMONE: CPT | Performed by: PATHOLOGY

## 2021-03-03 PROCEDURE — 99207 PR STATISTIC IV PUSH SINGLE INITIAL SUBSTANCE: CPT | Performed by: INTERNAL MEDICINE

## 2021-03-03 PROCEDURE — 81003 URINALYSIS AUTO W/O SCOPE: CPT | Performed by: PATHOLOGY

## 2021-03-03 PROCEDURE — 80048 BASIC METABOLIC PNL TOTAL CA: CPT | Performed by: PATHOLOGY

## 2021-03-03 PROCEDURE — 86352 CELL FUNCTION ASSAY W/STIM: CPT | Performed by: INTERNAL MEDICINE

## 2021-03-03 PROCEDURE — 83036 HEMOGLOBIN GLYCOSYLATED A1C: CPT | Performed by: PATHOLOGY

## 2021-03-03 PROCEDURE — 99214 OFFICE O/P EST MOD 30 MIN: CPT | Mod: 25 | Performed by: INTERNAL MEDICINE

## 2021-03-03 PROCEDURE — 36415 COLL VENOUS BLD VENIPUNCTURE: CPT | Performed by: PATHOLOGY

## 2021-03-03 PROCEDURE — 80197 ASSAY OF TACROLIMUS: CPT | Performed by: PATHOLOGY

## 2021-03-03 PROCEDURE — 84100 ASSAY OF PHOSPHORUS: CPT | Performed by: PATHOLOGY

## 2021-03-03 RX ORDER — EVEROLIMUS 0.5 MG/1
1 TABLET ORAL 2 TIMES DAILY
Qty: 120 TABLET | Refills: 11 | Status: SHIPPED | OUTPATIENT
Start: 2021-03-03 | End: 2021-05-03

## 2021-03-03 RX ORDER — MYCOPHENOLATE MOFETIL 250 MG/1
1000 CAPSULE ORAL 2 TIMES DAILY
Qty: 240 CAPSULE | Refills: 11 | Status: SHIPPED | OUTPATIENT
Start: 2021-03-03 | End: 2021-04-21 | Stop reason: ALTCHOICE

## 2021-03-03 RX ADMIN — Medication 5 ML: at 09:43

## 2021-03-03 ASSESSMENT — MIFFLIN-ST. JEOR: SCORE: 1638.19

## 2021-03-03 ASSESSMENT — PATIENT HEALTH QUESTIONNAIRE - PHQ9: SUM OF ALL RESPONSES TO PHQ QUESTIONS 1-9: 13

## 2021-03-03 ASSESSMENT — PAIN SCALES - GENERAL: PAINLEVEL: NO PAIN (0)

## 2021-03-03 NOTE — NURSING NOTE
Chief Complaint   Patient presents with     Follow Up     Return Heart Transplant     Vitals were taken and medication reconciled.    SHERWIN Murray  10:16 AM

## 2021-03-03 NOTE — NURSING NOTE
Transplant Coordinator Note    Reason for visit: 10 month visit  Coordinator: Present   Caregiver:  none present    Health concerns addressed today:  1. Fatigue- hard to get out due to weather.  Finishing Cardiac Rehab.  2.   3.       Immunosuppressants:   BID  Tacro  4/4      Labs:       Additional Notes:   Plan to switch to 1000 MMF  Everolimus in April- cut tacro in half for 3 days.  sgl2 inhib? diabetes        Labs, echo,  reviewed with patient  Medication record reviewed and reconciled  Questions and concerns addressed  Pt verbalized an understanding of plan of care.     Patient Instructions  1. Increase your Mycophenolate to 1000mg twice a day.  2. Follow up with Endocrine regarding blood sugars.  3. Angelika will call you with instructions on how to transition to Everolimus in early April.  4. Have your Immuknow and UA checked today in the lab.   5. Angelika will call you with all pending lab results.    Next transplant clinic appointment:    Next lab draw:  TBD  Coordinator will call with all pending results.     Please call transplant coordinator with any questions:    Angelika Muller RN BSN   Post Heart Transplant Nurse Coordinator  [facility]  Questions: [phone]

## 2021-03-03 NOTE — LETTER
"3/3/2021      RE: Mariusz Sharp  [address] [facility] [address]       Dear Colleague,    Thank you for the opportunity to participate in the care of your patient, Mariusz Sharp, at the [facility] [place] [facility]. Please see a copy of my visit note below.    ADULT HEART TRANSPLANT CLINIC  March 3, 2021      HPI:    \"Red\"Aron is a 60yr old male with a history of CAD (STEMI with ALYSSA to LAD 6/27/18), ICM (LVEF 20-25%) s/p HM3 LVAD (12/31/18), monomorphic VT (s/p ICD with shocks), LV thrombus, CKD, elevated PSA, pAF, HTN, HL, and DMII, now s/p OHT 5/18/20, who presents to clinic for ten-month routine follow up.    His post-transplant course was c/b NSVT (with no hemodynamic compromise), leukocytosis with C Acnes growing from his former LVAD site (thought to be a contaminant, but treated with IV vanco --> po doxy through 6/1/20, 14d course total), and in the setting of donor blood growing S anginosus and Veillonella (also thought to be a contaminant, but treated with Vanco/zosyn --> Vanco/Flagyl for a total of 7 days).  He also had hyperkalemia, which improved following kayexalate and stopping bactrim.  He ultimately discharged on 5/29/20.    He was readmitted 8/31-9/24 with fever, cough, diarrhea, and syncope.  He was found to have a post-obstructive vs aspiration pneumonia, RUL/suprahilar mass, and narrowing of multiple RUL bronchi.  He was initially treated as a fungal infection as his fungitell was +ve, but he did not clinically improve, nor did the size of the mass change.  Ultimately, tissue culture from the RUL mass showed abiotrophia defectiva (oral microbe), which was thought to be a contaminant, but did respond to antibiotics.  His MMF was decreased to 500mg twice daily, as ImmuKnow was noted to be low.    His biopsies have remained negative for rejection, and last AlloMap score from 10/2020 was 34.  He has no DSAs.  Baseline " coronary angiogram was deferred.  Last TTE 8/2020 showed stable graft function, with LVEF > 70% and normal RV size/function.  Last RHC 10/2020 showed normal biventricular filling pressures, with RA 5, mPA 20, PCW 15, and CI 2.9.      The patient was last seen in clinic on Jan 5th, 2021. Since his last visit, he states that he feels relatively well. His RADER and cough have been improving with progressive resolution of the pneumonia. He reports improvement in endurance since last visit, but still feels poorer than immediately after transplant (pre-pneumonia). He still reports tiredness during the day, requiring 1-2 naps every few hours. Reports snoring at night and awakens every 2-3 hours with a cumulative sleep of only about 5 hours at best at night.     He denies s/s of fluid overload, denies   has had issues with sleep since transplant, but notes that he is able to lie flat without PND and orthopnea.  His appetite has been good, and he denies nausea, vomiting, diarrhea, and constipation. He notes that his insurance has been charging a high price for novolog and his diabetes team is working on alternatives.  He otherwise denies chest pain, palpitations, dizziness, falls, headaches, acute vision changes, fevers, sore throat, and signs of bleeding.      Serostatus:  CMV D+/R-  EBV D-/R+  Toxo D-/R-    Patient Active Problem List    Diagnosis Date Noted     Elevated prostate specific antigen (PSA) 01/07/2021     Priority: Medium     Acute kidney failure, unspecified (H) 09/18/2020     Priority: Medium     Fever 08/31/2020     Priority: Medium     Lung mass 08/31/2020     Priority: Medium     Added automatically from request for surgery [id number]       Kidney stone 08/28/2020     Priority: Medium     Rhinovirus infection 08/20/2020     Priority: Medium     Chronic prostatitis without hematuria 08/19/2020     Priority: Medium     Prophylactic antibiotic 08/19/2020     Priority: Medium     Pneumonia 08/18/2020     Priority:  Medium     Heart replaced by transplant (H) 05/26/2020     Priority: Medium     Added automatically from request for surgery [id number]       Biventricular heart failure (H) 05/17/2020     Priority: Medium     Added automatically from request for surgery [id number]       Status post heart transplantation (H) 05/17/2020     Priority: Medium     Added automatically from request for surgery [id number]       Hydronephrosis 05/12/2020     Priority: Medium     Chronic systolic congestive heart failure (H) 02/13/2019     Priority: Medium     Added automatically from request for surgery [id number]       Weakness 01/11/2019     Priority: Medium     Type 2 diabetes mellitus with hyperglycemia, with long-term current use of insulin (H) 07/20/2018     Priority: Medium     Left ventricular apical thrombus following MI (H) 07/06/2018     Priority: Medium     Hematuria 07/04/2018     Priority: Medium     Long term current use of anticoagulant 07/03/2018     Priority: Medium     Hypotension, unspecified hypotension type 07/02/2018     Priority: Medium     Monomorphic ventricular tachycardia (H) 07/02/2018     Priority: Medium     Overview:   Added automatically from request for surgery [id number]       ASCVD (arteriosclerotic cardiovascular disease) 06/27/2018     Priority: Medium     CKD (chronic kidney disease) stage 3, GFR 30-59 ml/min 06/27/2018     Priority: Medium     Dyslipidemia 06/27/2018     Priority: Medium     ST elevation myocardial infarction involving left anterior descending (LAD) coronary artery (H) 06/27/2018     Priority: Medium     Type 2 diabetes mellitus with hyperglycemia (H) 06/27/2018     Priority: Medium     ANDREW (acute kidney injury) (H) 10/06/2016     Priority: Medium     Ureteral obstruction 10/06/2016     Priority: Medium     Unilateral inguinal hernia 09/09/2009     Priority: Medium     Overview:   IMO Update 10/11         PAST MEDICAL HISTORY:  Past Medical History:   Diagnosis Date     Acute kidney injury (H)       CKD (chronic kidney disease)      Coronary artery disease      Diabetes mellitus (H)      HTN (hypertension)      Hyperlipidemia      ICD (implantable cardioverter-defibrillator), single chamber- NOT dependent 7/17/2018     Ischemic cardiomyopathy      LV (left ventricular) mural thrombus      Paroxysmal atrial flutter (H)      RVF (right ventricular failure) (H)      Systolic heart failure (H)      Ventricular tachycardia (H)        CURRENT MEDICATIONS:  Prescription Medications as of 3/5/2021       Rx Number Disp Refills Start End Last Dispensed Date Next Fill Date Owning Pharmacy    ACCU-CHEK [place] PLUS test strip  300 strip 0 6/22/2020    [place], [place] - [facility] [id number]    Sig: Check BG 3 times daily at meals and at bedtime.    Class: E-Prescribe    acetaminophen (TYLENOL) 325 MG tablet     5/29/2020        Sig: Take 2 tablets (650 mg) by mouth every 4 hours as needed for other (multimodal surgical pain management along with NSAIDS and opioid medication as indicated based on pain control and physical function.)    Class: OTC    Route: Oral    amLODIPine (NORVASC) 5 MG tablet  90 tablet 3 9/28/2020    [facility]/[address]    Sig: Take 1 tablet (5 mg) by mouth daily    Class: E-Prescribe    Route: Oral    BD SILVANA U/F 32G X 4 MM insulin pen needle  600 each 3 10/9/2020    [facility]/[address]    Sig: Use 6 times daily.    Class: E-Prescribe    calcium carbonate 600 mg-vitamin D 400 units (CALTRATE) 600-400 MG-UNIT per tablet     5/29/2020        Sig: Take 1 tablet by mouth 2 times daily (with meals)    Class: OTC    Route: Oral    Renewals     Renewal requests to authorizing provider (Damari Ohara, CARMEN MARKHAM) <b>prohibited</b>          Continuous Blood Gluc  (DEXCOM G6 ) DON  1 Device 1 7/2/2020    [facility]/[place], [place]  [facility] [address]    Si each daily    Class: E-Prescribe    Route: Does not apply    Prior authorization: Closed - Prior Authorization not required for patient/medication    Continuous Blood Gluc Sensor (DEXCOM G6 SENSOR) MISC  9 each 3 2020    [facility] [place] [address] [address]    Si each every 10 days    Class: E-Prescribe    Route: Does not apply    Prior authorization: Closed - Prior Authorization not required for patient/medication    Continuous Blood Gluc Transmit (DEXCOM G6 TRANSMITTER) MISC  1 each 3 2020    [facility] [place] [address] [address]    Si each every 3 months    Class: E-Prescribe    Route: Does not apply    Prior authorization: Closed - Prior Authorization not required for patient/medication    everolimus (ZORTRESS) 0.5 MG tablet  120 tablet 11 3/3/2021    [id number] - [address] AT [facility] OF [facility] [address]    Sig: Take 2 tablets (1 mg) by mouth 2 times daily    Class: E-Prescribe    Notes to Pharmacy: TXP DT 2020 (Heart) TXP Dischg DT 2020 DX Heart transplant Z94.1 TX [facility] [place] ([place])  *TEST CLAIM*    Route: Oral    hydrALAZINE (APRESOLINE) 50 MG tablet  540 tablet 3 10/13/2020    [facility] [place] [address] [address]    Sig: Take 2 tablets (100 mg) by mouth 3 times daily    Class: E-Prescribe    Route: Oral    insulin aspart (NOVOLOG PEN) 100 UNIT/ML pen  3 mL 0 10/27/2020    [facility] [place] [address] [address]    Sig: Inject 1-14 units per custom scale  For Pre-Meal BG less than 140, no additional insulin needed   to 159 give 1 units.    to 179 give 2 units.    to 199 give 3 units.    to 219 give 4 units.    to 239 give 5 units.    to 259 give 6 units.    to 279 give 7 units.    to 299 give 8 units.    to  319 give 9 units.    to 339 give 10 units.    to 359 give 11 units.    to 379 give 12 units.   to 399 give 13 units.  BG >/=400 give 14 units.    Class: Local Print    insulin aspart (NOVOLOG PEN) 100 UNIT/ML pen  3 mL 0 9/24/2020    [address]    Sig: Inject 1-11 Units Subcutaneous At Bedtime Inject 1-11 units at bedtime per custom scale   For Bedtime BG less than 200, no additional insulin needed   to 219 give 1 units.    to 239 give 2 units.    to 259 give 3 units.    to 279 give 4 units.    to 299 give 5 units.    to 319 give 6 units.    to 339 give 7 units.    to 359 give 8 units    to 379 give 9 units.   to 399 give 10 units.  BG >/=400 give 11 units.    Class: Historical    Route: Subcutaneous    insulin glargine (LANTUS PEN) 100 UNIT/ML pen  15 mL 3 1/6/2021    [facility] [address]    Sig: Inject 28 units subcutaneous at .    Class: E-Prescribe    Notes to Pharmacy: If Lantus is not covered by insurance, may substitute Basaglar at same dose and frequency.      loperamide (IMODIUM) 2 MG capsule  60 capsule 0 9/24/2020    [address]    Sig: Take 1 capsule (2 mg) by mouth 4 times daily as needed for diarrhea    Class: E-Prescribe    Route: Oral    magnesium oxide (MAG-OX) 400 (241.3 Mg) MG tablet  90 tablet 3 7/15/2020    [facility]/[address]    Sig: Take 1 tablet (400 mg) by mouth 2 times daily    Class: E-Prescribe    Route: Oral    multivitamin w/minerals (THERA-VIT-M) tablet  90 tablet 3 9/24/2020    [address]    Sig: Take 1 tablet by mouth daily    Class: E-Prescribe    Route: Oral    mycophenolate (GENERIC EQUIVALENT) 250 MG capsule  240 capsule  11 3/3/2021    [id number] - [facility] [address] AT [facility] OF [facility]    Sig: Take 4 capsules (1,000 mg) by mouth 2 times daily    Class: E-Prescribe    Notes to Pharmacy: TXP DT 5/18/2020 (Heart) TXP Dischg DT 5/29/2020 DX Heart transplant Z94.1 TX Center [facility] [place] [place] ([place], [place])    Route: Oral    NOVOLOG FLEXPEN 100 UNIT/ML soln  15 mL 3 11/27/2020    [facility] - [place] [address]    Sig: INJECT 1 TO 14 UNITS UNDER THE SKIN  WITH MEALS AND 1 TO 11 UNITS AT BEDTIME PER SLIDING SCALES, AND INJECT 1 UNIT UNDER THE SKIN PER 5 GRAMS OF CARBOHYDRATES WITH MEALS AND SNACKS    Class: E-Prescribe    pantoprazole (PROTONIX) 40 MG EC tablet  90 tablet 3 9/28/2020    [facility] - [place] [address]    Sig: Take 1 tablet (40 mg) by mouth every morning (before breakfast)    Class: E-Prescribe    Route: Oral    rosuvastatin (CRESTOR) 10 MG tablet  90 tablet 3 7/27/2020    [facility] - [place] [address]    Sig: TAKE ONE TABLET BY MOUTH ONCE DAILY    Class: E-Prescribe    tacrolimus (GENERIC EQUIVALENT) 1 MG capsule  240 capsule 3 1/5/2021    [id number] [address] AT [facility] OF [address]    Sig: Take 4mg (4 caps) in the AM and 4mg (4 caps) in the PM.    Class: E-Prescribe    Notes to Pharmacy: TXP DT 5/18/2020 (Heart) TXP Dischg DT 5/29/2020 DX Heart transplant Z94.1 TX Center [facility] [place] [place] ([place], [place])          ROS:   Constitutional: No fever, chills, or sweats. Weight gain.   ENT: No visual disturbance, ear ache, epistaxis, sore throat.   Allergies/Immunologic: Negative.   Respiratory: As per HPI.  Cardiovascular: As per HPI.   GI: No nausea, vomiting, hematemesis, melena, or hematochezia.   : No urinary frequency, dysuria, or hematuria.   Integument: Negative.   Psychiatric: Negative.   Neuro:  "Negative.   Endocrinology: As per HPI.  Musculoskeletal: As per HPI.    Exam:  /88 (BP Location: Right arm, Patient Position: Chair, Cuff Size: Adult Large)   Pulse 109   Ht 1.626 m (5' 4\")   Wt 90.7 kg (200 lb)   SpO2 99%   BMI 34.33 kg/m    In general, the patient is a pleasant male in no apparent distress.    HEENT: NC/AT. PERRLA. EOMI.  Sclerae white, not injected.    Neck:  No adenopathy, No thyromegaly.    COR: No jugular venous distention when sitting upright.  RRR.  Normal S1 S2 splits physiologically.  No murmur, rub click, or gallop.    Lungs:  CTA. No rhonchi.    Abdomen: soft, nontender, nondistended.  No organomegaly.  Extremities:  No clubbing or cyanosis, trace bilateral LE edema at ankles  Neuro: Alert & Oriented x 3, grossly non focal.  Integument: no open lesions, rashes, or jaundice.    Labs:  CBC RESULTS:   Lab Results   Component Value Date    WBC 6.4 03/03/2021    RBC 3.99 (L) 03/03/2021    HGB 10.7 (L) 03/03/2021    HCT 32.9 (L) 03/03/2021    MCV 83 03/03/2021    MCH 26.8 03/03/2021    MCHC 32.5 03/03/2021    RDW 13.8 03/03/2021     03/03/2021       BMP RESULTS:  Lab Results   Component Value Date     03/03/2021    POTASSIUM 4.0 03/03/2021    CHLORIDE 107 03/03/2021    CO2 23 03/03/2021    ANIONGAP 8 03/03/2021     (H) 03/03/2021    BUN 52 (H) 03/03/2021    CR 2.09 (H) 03/03/2021    GFRESTIMATED 34 (L) 03/03/2021    GFRESTBLACK 39 (L) 03/03/2021    ARLENE 9.3 03/03/2021      LIPID RESULTS:  Lab Results   Component Value Date    CHOL 161 10/29/2020    HDL 51 10/29/2020    LDL 81 10/29/2020    TRIG 144 10/29/2020    NHDL 110 10/29/2020       IMMUNOSUPPRESSANT LEVELS  Lab Results   Component Value Date    TACROL 5.0 03/03/2021    DOSTAC 03/02/21  2100 03/03/2021       No components found for: CK  Lab Results   Component Value Date    MAG 1.7 03/03/2021     Lab Results   Component Value Date    A1C 10.0 (H) 03/03/2021     Lab Results   Component Value Date    PHOS 3.8 " "03/03/2021     Lab Results   Component Value Date    NTBNPI 1,673 (H) 08/18/2020     Lab Results   Component Value Date    SAITESTMET SA FCS 10/29/2020    SAICELL Class I 10/29/2020    CZ9WCEPSZ Cw:15 10/29/2020    NH5QXLHMUW Cw:18 10/29/2020    SAIREPCOM  10/29/2020      Test performed by modified procedure. Serum heat inactivated and tested   by a modified ([place]) protocol including fetal calf serum addition.   High-risk, mfi >3,000. Mod-risk, mfi 500-3,000.       Lab Results   Component Value Date    SAIITESTME SA FCS 10/29/2020    SAIICELL Class II 10/29/2020    UN4NTMEIN None 10/29/2020    ZW1XVWCPXT DR:10 10/29/2020    SAIIREPCOM  10/29/2020      Test performed by modified procedure. Serum heat inactivated and tested   by a modified ([place]) protocol including fetal calf serum addition.   High-risk, mfi >3,000. Mod-risk, mfi 500-3,000.       Lab Results   Component Value Date    CSPEC Plasma 10/29/2020       Assessment and Plan:   \"Vasyl" Aron is a 60yr old male with a history of CAD (STEMI with ALYSSA to LAD 6/27/18), ICM (LVEF 20-25%) s/p HM3 LVAD (12/31/18), monomorphic VT (s/p ICD with shocks), LV thrombus, CKD, elevated PSA, pAF, HTN, HL, and DMII, now s/p OHT 5/18/20, who presents to clinic for routine follow up.    ICM, s/p OHT 5/18/20  His post-transplant course was c/b NSVT (with no hemodynamic compromise), leukocytosis with C Acnes growing from his former LVAD site (thought to be a contaminant, but treated with IV vanco --> po doxy through 6/1/20, 14d course total), and in the setting of donor blood growing S anginosus and Veillonella (also thought to be a contaminant, but treated with Vanco/zosyn --> Vanco/Flagyl for a total of 7 days). He ultimately discharged 5/29. He was readmitted 8/31-9/24 with fever, cough, diarrhea, and syncope.  He was initially treated as a fungal infection as his fungitell was +ve, but he did not clinically improve, nor did the size of the mass change.  Ultimately, tissue " culture from the RUL mass showed abiotrophia defectiva (oral microbe), which was thought to be a contaminant, but did respond to antibiotics. He's currently off anti-microbials and recovering from a pneumonia perspective.     His biopsies have remained negative for rejection, and last AlloMap score from 10/2020 was 34.  He has no DSAs.  Baseline coronary angiogram has been deferred. Labs today show stable electrolytes. Creatinine 2.01 mg/dL (baseline). LFTs normal. CBC shows stable blood counts. Tacro level in process. RHC in January 2021 showed normal biventricular filling pressures, with RA 4, mPA 14, PCW 11, and CI 2.91.    Surveillance TTE today showed stable graft function, with LVEF > 65% and normal RV size/function.    Mr. Sharp appears very well today. His predominant complaint during the visit it that of fatigue, which is likely multifactorial secondary to poor sleep hygiene w/ multiple nocturnal awakenings, untreated obstructive sleep apnea, lack of exercise and the otherwise uncooperative weather keeping him from outdoor activity. Will check thyroid function, other labs stable.      Serostatus:  - CMV D+/R-  - EBV D-/R+  - Toxo D-/R-    Immunosuppression:  - Increase MMF to 1000 mg BID  - tacro, goal level 6-8.  Level today pending, will readjust as required.   - Will transition patient from Tac to Everolimus per protocol in April.     PPx:  - CAV: Not on aspirin. Continue rosuvastatin 10mg daily.  - GI:  Pantoprazole 40mg daily  - Osteoporosis:  Calcium/vitamin d supplements    Graft function:  - BPs:  Controlled, continue amlodipine 5mg daily and hydralazine 100mg TID  - fluid status:  Euvolemic, not on diuretics    Health maintenance:  - needs shingrex, needs to determine if insurance will cover    Post-obstructive versus aspiration pneumonia  RUL/suprahilar mass   +abiotrophia defectiva  Followed by ID, given the improving symptoms with no fever and almost resolution of the cough, recommend no utility  of repeat CT unless symptoms worsen.     Leukopenia, resolved  Increasing MMF as noted above, and will trend CBC.    DMII  BGs slightly elevated, following with endocrinology.   Consider initiation of SGLT2 inhibitor if approved by insurance.     Elevated PSA  Prostate MRI 5/2020 showed signs of BPH. He was referred to urology, who recommended rechecking PSA, will consider biopsy if PSA persistently elevated.        The above was reviewed with Mr. Sharp, who verbalized understanding and will call with further questions/concerns.  Case d/w staff physician, Dr Ortiz, who's in agreement with the plan above.     Joselyn Chase MD,   Cardiovascular Disease Fellow  Pager [phone]      CC  Patient Care Team:  Milad Fry MD as PCP - General (Family Practice)  Antonia Byrne APRN CNP as Nurse Practitioner (Cardiology)  Jenni Ortiz MD as MD (Cardiology)  Megan Ibarra MD as MD (Urology)  Nina Gutierrez RN as Specialty Care Coordinator (Urology)  Angelika Muller RN as Transplant Coordinator (Cardiology)  Xavi Mckeon [facility] as Pharmacist (Pharmacist)  Jeannette Schafer PA-C as Physician Assistant (INTERNAL MEDICINE - ENDOCRINOLOGY, DIABETES & METABOLISM)  Tavares Rodriguez MD as Assigned Infectious Disease Provider  Donny Gomes MD as Assigned Cancer Care Provider  Jenni Ortiz MD as Assigned Heart and Vascular Provider  Kenton Carlos MD as Assigned Surgical Provider  JENNI ORTIZ      Please do not hesitate to contact me if you have any questions/concerns.     Sincerely,     Jenni Ortiz MD

## 2021-03-03 NOTE — PATIENT INSTRUCTIONS
Patient Instructions  1. Increase your Mycophenolate to 1000mg twice a day.  2. Follow up with Endocrine regarding blood sugars.  3. Angelika will call you with instructions on how to transition to Everolimus in early April.  4. Have your Immuknow and UA checked today in the lab.   5. Angelika will call you with all pending lab results.    Next transplant clinic appointment:    Next lab draw:  TBD  Coordinator will call with all pending results.     Please call transplant coordinator with any questions:    Angelika Muller RN BSN   Post Heart Transplant Nurse Coordinator  [facility]  Questions: [phone]

## 2021-03-05 LAB — IMMUKNOW IMMUNE CELL FUNCTION: 441 NG/ML

## 2021-03-07 LAB
ALLOMAP SCORE (EXTERNAL): 28
NEGATIVE PREDICTIVE VALUE PERCENT (EXTERNAL): 98.9 %
POSITIVE PREDICTIVE VALUE PERCENT (EXTERNAL): 2.1 %

## 2021-03-11 ENCOUNTER — PRE VISIT (OUTPATIENT)
Dept: UROLOGY | Facility: CLINIC | Age: 61
End: 2021-03-11

## 2021-03-16 DIAGNOSIS — Z94.1 STATUS POST HEART TRANSPLANTATION (H): ICD-10-CM

## 2021-03-17 NOTE — PROGRESS NOTES
Red is a 60 year old who is being evaluated via a billable video visit.      How would you like to obtain your AVS? MyChart    Will anyone else be joining your video visit? No    Paola Beaulieu MA    Outcome for 03/17/21 12:19 PM: Blood sugars have been high in the 200-300's.     Insurance will not cover novolog or dexcom sensors.

## 2021-03-17 NOTE — PATIENT INSTRUCTIONS
We appreciate your assistance in coordinating your healthcare.     Please upload your insulin pump, blood sugar meter and/or continuous glucose monitor at home 1-2 days before your next diabetes-related appointment.   This will allow your provider to review your  data before your scheduled virtual visit.    To ask a question to your Endocrine care team, please send them a [employer] message, or reach them by phone at [phone]     To expedite your medication refill(s), please contact your pharmacy and have them   fax a refill request to: [phone].  *Please allow 3 business days for routine medication refills.  *Please allow 5 business days for controlled substance medication refills.    For after-hours urgent Endocrine issues, that do not require 681, please dial [phone], and ask to speak with the Endocrinologist On-Call      .

## 2021-03-18 ENCOUNTER — VIRTUAL VISIT (OUTPATIENT)
Dept: ENDOCRINOLOGY | Facility: CLINIC | Age: 61
End: 2021-03-18
Payer: COMMERCIAL

## 2021-03-18 DIAGNOSIS — E11.65 TYPE 2 DIABETES MELLITUS WITH HYPERGLYCEMIA, WITH LONG-TERM CURRENT USE OF INSULIN (H): Primary | ICD-10-CM

## 2021-03-18 DIAGNOSIS — Z79.4 TYPE 2 DIABETES MELLITUS WITH HYPERGLYCEMIA, WITH LONG-TERM CURRENT USE OF INSULIN (H): Primary | ICD-10-CM

## 2021-03-18 PROCEDURE — 99215 OFFICE O/P EST HI 40 MIN: CPT | Mod: GT | Performed by: PHYSICIAN ASSISTANT

## 2021-03-18 NOTE — LETTER
3/18/2021       RE: Mariusz Sharp  [address] [facility] [address]     Dear Colleague,    Thank you for referring your patient, Mariusz Sharp, to the [facility] [place] at [facility]. Please see a copy of my visit note below.    Red is a 60 year old who is being evaluated via a billable video visit.      How would you like to obtain your AVS? MyChart    Will anyone else be joining your video visit? No    Paola Beaulieu MA    Outcome for 03/17/21 12:19 PM: Blood sugars have been high in the 200-300's.     Insurance will not cover novolog or dexcom sensors.       Due to the COVID 19 pandemic this visit was converted to a video visit in order to help prevent spread of infection in this patient and the general population.    Time of start: 10:14 am  Time of end: 10:30 am  Total duration of video visit: 16 minutes.    CHANELL Vee ( Red ) ISABEL Sharp is a 60 year old male with type 2 diabetes mellitus.Video visit today for diabetes care.  Pt was admitted 5/17/2020-5/29/2020 and underwent a heart transplant on 5/18/2020.  Red was also admitted 8/31/2020-9/24/2020 with fever, cough, diarrhea and syncope in setting of increase RADER and was dx having post-obstructive vs apiration pneumonia. Pt also had RUL/suprahilar mass + abiotrophia defectiva with narrowing of multiple RUL bronchil. He was tx with antibiotics with improvement.  Pt's hx is significant for type 2 diabetes mellitus dx 3 years ago, HTN, hyperlipidemia, hx of ischemic heart disease s/p STEMI with ALYSSA 2019, LVAD placement, Stage 3 CKD, and obesity.  Apparently heart donor blood cx and respiratory cultures were + for H flu, staph and strep.  Red was also admitted 5/12-5/15/2020 for ANDREW due to nephrolithiasis complicated by hydronephrosis/ANDREW.  For his diabetes, pt is currently taking Lantus 28 units subcutaneous each pm and prescribed to take Novolog 1 unit/5 gms CHO with meals  with correction scale  ( 1 unit/20 for BG > 140 ) and bedtime correction scale ( 1 unit/25 for BG > 200). However, he tells me he is unable to afford his Novolog and has been taking LESS Novolog.  Apparently his paperwork to get insulin via the patient assistance program is still being worked on per patient.  Pt's A1C most recent A1C was high at 10.0 %. His previous A1C  was 6.2 % on 10/29/2020 and A1C was 7.4 % on 8/19/2020. He is anemic.  His insurance will no longer cover his DexcomG6 sensor supplies, so he is not using his Dexcom sensor.  I have no glucose meter download today.  Pt states his blood sugars are in the 200-300 range. No hypoglycemia.  On ROS today, main complaint is fatigue.  He did walk this past weekend outdoors and felt better.  He still needs evaluation for sleep apnea.  Breathing stable at this time.. No cough, fever or chills.  He has not had the COVID19 vaccine yet.  Mild tingling in both feet. He denies foot ulcers.  Pt denies headaches, blurred vision, n/v, abd pain, diarrhea, dysuria or hematuria.    Diabetes Care  Retinopathy:none; pt seen by Oph in Jan 2021.  Nephropathy:yes- hx of CKD/ANDREW.  Neuropathy: mild tingling in both feet.  Foot Exam:no exam today.  Taking aspirin: yes.  Lipids: LDL 81 on 10/29/2020. Pt is taking Crestor.  CAD: yes; s/p heart transplant on 5/18/2020.  Depression: no.  Insulin: Basal and meal time insulin with correction scale ( 1 unit/20 for BG > 140 with meals and > 200 at bedtime).  Testing: glucose meter. Pt has a DexcomG6 sensor - not using sensor since his insurance will not cover his Dexcom supplies.    ROS  See under HPI.    Allergies  No Known Allergies    Medications  Current Outpatient Medications   Medication Sig Dispense Refill     ACCU-CHEK [place]OTTE PLUS test strip Check BG 3 times daily at meals and at bedtime. 300 strip 0     acetaminophen (TYLENOL) 325 MG tablet Take 2 tablets (650 mg) by mouth every 4 hours as needed for other (multimodal surgical  pain management along with NSAIDS and opioid medication as indicated based on pain control and physical function.)       amLODIPine (NORVASC) 5 MG tablet Take 1 tablet (5 mg) by mouth daily 90 tablet 3     BD SILVANA U/F 32G X 4 MM insulin pen needle Use 6 times daily. 600 each 3     calcium carbonate 600 mg-vitamin D 400 units (CALTRATE) 600-400 MG-UNIT per tablet Take 1 tablet by mouth 2 times daily (with meals)       everolimus (ZORTRESS) 0.5 MG tablet Take 2 tablets (1 mg) by mouth 2 times daily 120 tablet 11     hydrALAZINE (APRESOLINE) 50 MG tablet Take 2 tablets (100 mg) by mouth 3 times daily 540 tablet 3     insulin glargine (LANTUS PEN) 100 UNIT/ML pen Inject 28 units subcutaneous at hs. 15 mL 3     magnesium oxide (MAG-OX) 400 (241.3 Mg) MG tablet Take 1 tablet (400 mg) by mouth 2 times daily 90 tablet 3     multivitamin w/minerals (THERA-VIT-M) tablet Take 1 tablet by mouth daily 90 tablet 3     mycophenolate (GENERIC EQUIVALENT) 250 MG capsule Take 4 capsules (1,000 mg) by mouth 2 times daily 240 capsule 11     pantoprazole (PROTONIX) 40 MG EC tablet Take 1 tablet (40 mg) by mouth every morning (before breakfast) 90 tablet 3     rosuvastatin (CRESTOR) 10 MG tablet TAKE ONE TABLET BY MOUTH ONCE DAILY 90 tablet 3     tacrolimus (GENERIC EQUIVALENT) 1 MG capsule Take 4mg (4 caps) in the AM and 4mg (4 caps) in the PM. 240 capsule 3     Continuous Blood Gluc  (DEXCOM G6 ) DON 1 each daily (Patient not taking: Reported on 3/3/2021) 1 Device 1     Continuous Blood Gluc Sensor (DEXCOM G6 SENSOR) MISC 1 each every 10 days (Patient not taking: Reported on 3/3/2021) 9 each 3     Continuous Blood Gluc Transmit (DEXCOM G6 TRANSMITTER) MISC 1 each every 3 months (Patient not taking: Reported on 3/3/2021) 1 each 3     insulin aspart (NOVOLOG PEN) 100 UNIT/ML pen Inject 1-14 units per custom scale  For Pre-Meal BG less than 140, no additional insulin needed   to 159 give 1 units.    to 179  give 2 units.    to 199 give 3 units.    to 219 give 4 units.    to 239 give 5 units.    to 259 give 6 units.    to 279 give 7 units.    to 299 give 8 units.    to 319 give 9 units.    to 339 give 10 units.    to 359 give 11 units.    to 379 give 12 units.   to 399 give 13 units.  BG >/=400 give 14 units. (Patient not taking: Reported on 3/17/2021) 3 mL 0     insulin aspart (NOVOLOG PEN) 100 UNIT/ML pen Inject 1-11 Units Subcutaneous At Bedtime Inject 1-11 units at bedtime per custom scale   For Bedtime BG less than 200, no additional insulin needed   to 219 give 1 units.    to 239 give 2 units.    to 259 give 3 units.    to 279 give 4 units.    to 299 give 5 units.    to 319 give 6 units.    to 339 give 7 units.    to 359 give 8 units    to 379 give 9 units.   to 399 give 10 units.  BG >/=400 give 11 units. 3 mL 0     loperamide (IMODIUM) 2 MG capsule Take 1 capsule (2 mg) by mouth 4 times daily as needed for diarrhea (Patient not taking: Reported on 3/3/2021) 60 capsule 0     NOVOLOG FLEXPEN 100 UNIT/ML soln INJECT 1 TO 14 UNITS UNDER THE SKIN  WITH MEALS AND 1 TO 11 UNITS AT BEDTIME PER SLIDING SCALES, AND INJECT 1 UNIT UNDER THE SKIN PER 5 GRAMS OF CARBOHYDRATES WITH MEALS AND SNACKS (Patient not taking: Reported on 3/17/2021) 15 mL 3       Family History  Mother and father with hx of type 2 DM.  Social History   reports that he has never smoked. He has never used smokeless tobacco. He reports that he does not drink alcohol or use drugs.     Past Medical History  Past Medical History:   Diagnosis Date     Acute kidney injury (H)      CKD (chronic kidney disease)      Coronary artery disease      Diabetes mellitus (H)      HTN (hypertension)      Hyperlipidemia      ICD (implantable cardioverter-defibrillator), single chamber- NOT dependent 7/17/2018     Ischemic cardiomyopathy      LV  (left ventricular) mural thrombus      Paroxysmal atrial flutter (H)      RVF (right ventricular failure) (H)      Systolic heart failure (H)      Ventricular tachycardia (H)        Past Surgical History:   Procedure Laterality Date     BRONCHOSCOPY (RIGID OR FLEXIBLE), DIAGNOSTIC N/A 9/15/2020    Procedure: BRONCHOSCOPY, WITH BRONCHOALVEOLAR LAVAGE;  Surgeon: Elder Mejia MD;  Location:  GI     BRONCHOSCOPY (RIGID OR FLEXIBLE), DIAGNOSTIC N/A 9/17/2020    Procedure: BRONCHOSCOPY, WITH BRONCHOALVEOLAR LAVAGE;  Surgeon: Bill Lemus MD;  Location:  OR     BRONCHOSCOPY RIGID OR FLEXIBLE W/TRANSENDOSCOPIC ENDOBRONCHIAL ULTRASOUND GUIDED Right 9/8/2020    Procedure: BRONCHOSCOPY, WITH ENDOBRONCHIAL ULTRASOUND;  Surgeon: Donny Mercedes MD;  Location:  GI     COLONOSCOPY N/A 12/7/2018    Procedure: COLONOSCOPY;  Surgeon: Krish Mercado MD;  Location: [facility] OR     CV HEART BIOPSY N/A 5/24/2020    Procedure: Heart Biopsy;  Surgeon: Kit Bacon MD;  Location:  HEART CARDIAC CATH LAB     CV HEART BIOPSY N/A 6/1/2020    Procedure: CV HEART BIOPSY;  Surgeon: Kit Bacon MD;  Location:  HEART CARDIAC CATH LAB     CV HEART BIOPSY N/A 6/8/2020    Procedure: CV HEART BIOPSY;  Surgeon: Kit Bacon MD;  Location:  HEART CARDIAC CATH LAB     CV HEART BIOPSY N/A 6/15/2020    Procedure: CV HEART BIOPSY;  Surgeon: Kit Bacon MD;  Location:  HEART CARDIAC CATH LAB     CV HEART BIOPSY N/A 6/30/2020    Procedure: CV HEART BIOPSY;  Surgeon: Kit Bacon MD;  Location:  HEART CARDIAC CATH LAB     CV HEART BIOPSY N/A 7/14/2020    Procedure: CV HEART BIOPSY;  Surgeon: Brian Long MD;  Location:  HEART CARDIAC CATH LAB     CV HEART BIOPSY N/A 7/29/2020    Procedure: CV HEART BIOPSY;  Surgeon: Momo Hunt MD;  Location:  HEART CARDIAC CATH LAB     CV HEART BIOPSY N/A 8/13/2020    Procedure: CV HEART  BIOPSY;  Surgeon: Khris Mcclelland MD;  Location: [facility]ARD[facility]AC C[facility] LAB     CV HEART BIOPSY N/A 8/26/2020    Procedure: CV HEART BIOPSY;  Surgeon: Momo Hunt MD;  Location: [facility] HEART CARDIAC CATH LAB     CV HEART BIOPSY N/A 9/30/2020    Procedure: CV HEART BIOPSY;  Surgeon: Artemio Ulloa MD;  Location: [facility]ART CARDIAC CATH LAB     CV HEART BIOPSY N/A 10/29/2020    Procedure: CV HEART BIOPSY;  Surgeon: Kit Bacon MD;  Location: [facility] HEART [facility]ARD[facility]AC [facility] LAB     CV HEART BIOPSY N/A 1/5/2021    Procedure: CV HEART BIOPSY;  Surgeon: Carlin Garcia MD;  Location:  HEART CARDIAC CATH LAB     CV RIGHT HEART CATH MEASUREMENTS RECORDED N/A 2/19/2019    Procedure: RHC;  Surgeon: Brian Long MD;  Location:  HEART CARDIAC CATH LAB     CV RIGHT HEART CATH MEASUREMENTS RECORDED N/A 7/5/2019    Procedure: CV RIGHT HEART CATH;  Surgeon: Khris Mcclelland MD;  Location:  HEART CARDIAC CATH LAB     CV RIGHT HEART CATH MEASUREMENTS RECORDED N/A 5/24/2020    Procedure: Right Heart Cath;  Surgeon: Kit Bacon MD;  Location:  HEART CARDIAC CATH LAB     CV RIGHT HEART CATH MEASUREMENTS RECORDED N/A 6/1/2020    Procedure: CV RIGHT HEART CATH;  Surgeon: Kit Bacon MD;  Location:  HEART CARDIAC CATH LAB     CV RIGHT HEART CATH MEASUREMENTS RECORDED N/A 6/8/2020    Procedure: CV RIGHT HEART CATH;  Surgeon: Kit Bacon MD;  Location:  HEART CARDIAC CATH LAB     CV RIGHT HEART CATH MEASUREMENTS RECORDED N/A 6/15/2020    Procedure: CV RIGHT HEART CATH;  Surgeon: Kit Bacon MD;  Location:  HEART CARDIAC CATH LAB     CV RIGHT HEART CATH MEASUREMENTS RECORDED N/A 6/30/2020    Procedure: CV RIGHT HEART CATH;  Surgeon: Kit Bacon MD;  Location:  HEART CARDIAC CATH LAB     CV RIGHT HEART CATH MEASUREMENTS RECORDED N/A 7/14/2020    Procedure: CV RIGHT HEART CATH;  Surgeon:  Brian Long MD;  Location:  HEART CARDIAC CATH LAB     CV RIGHT HEART CATH MEASUREMENTS RECORDED N/A 7/29/2020    Procedure: CV RIGHT HEART CATH;  Surgeon: Momo Hunt MD;  Location:  HEART CARDIAC CATH LAB     CV RIGHT HEART CATH MEASUREMENTS RECORDED N/A 8/13/2020    Procedure: CV RIGHT HEART CATH;  Surgeon: Khris Mcclelland MD;  Location:  HEART CARDIAC CATH LAB     CV RIGHT HEART CATH MEASUREMENTS RECORDED N/A 8/26/2020    Procedure: CV RIGHT HEART CATH;  Surgeon: Momo Hunt MD;  Location:  HEART CARDIAC CATH LAB     CV RIGHT HEART CATH MEASUREMENTS RECORDED N/A 9/30/2020    Procedure: Right Heart Cath;  Surgeon: Artemio Ulloa MD;  Location:  HEART CARDIAC CATH LAB     CV RIGHT HEART CATH MEASUREMENTS RECORDED N/A 10/29/2020    Procedure: CV RIGHT HEART CATH;  Surgeon: Kit Bacon MD;  Location:  HEART CARDIAC CATH LAB     CV RIGHT HEART CATH MEASUREMENTS RECORDED N/A 1/5/2021    Procedure: CV RIGHT HEART CATH;  Surgeon: Carlin Garcia MD;  Location:  HEART CARDIAC CATH LAB     ENDOBRONCHIAL ULTRASOUND FLEXIBLE N/A 9/17/2020    Procedure: BRONCHOSCOPY, flexible, endobronchial ultrasound with transbronchial biopsies, endobronchial biopsies;  Surgeon: Bill Lemus MD;  Location:  OR      PRQ [id number] CORONARY ANGIOPLASTY ADDL BRANCH      PCI and ALYSSA to ostial LAD on 6/27/18     IMPLANT AUTOMATIC IMPLANTABLE CARDIOVERTER DEFIBRILLATOR       INSERT VENTRICULAR ASSIST DEVICE LEFT (HEARTMATE II) N/A 12/31/2018    Procedure: Median Sternotomy, On Cardiopulmonary Bypass Pump, Insertion of Left Ventricular Assist Device (Heartmate III);  Surgeon: Kamaljit Baker MD;  Location: [facility] OR     PICC DOUBLE LUMEN PLACEMENT Right 05/22/2020    5FR DL PICC inserted at right basilic vein, length 38cm.     TRANSPLANT HEART RECIPIENT AFTER HEART MATE N/A 5/18/2020    Procedure: REDO MEDIAN STERNOTOMY, LYSIS OF ADHESIONS, ON CARDIOPULMONARY BYPASS PUMP, HEART  TRANSPLANT RECIPIENT, REMOVAL OF LEFT VENTRICULAR ASSIST DEVICE, REMOVAL OF AUTOMATED IMPLANTABLE CARDIOVERTER DEFIBRILLATOR;  Surgeon: Elder Willis MD;  Location: [facility] OR       Physical Exam    No exam today.    RESULTS  Creatinine   Date Value Ref Range Status   03/03/2021 2.09 (H) 0.66 - 1.25 mg/dL Final     GFR Estimate   Date Value Ref Range Status   03/03/2021 34 (L) >60 mL/min/[1.73_m2] Final     Comment:     Non  GFR Calc  Starting 12/18/2018, serum creatinine based estimated GFR (eGFR) will be   calculated using the Chronic Kidney Disease Epidemiology Collaboration   (CKD-EPI) equation.       Hemoglobin A1C   Date Value Ref Range Status   03/03/2021 10.0 (H) 0 - 5.6 % Final     Comment:     Normal <5.7% Prediabetes 5.7-6.4%  Diabetes 6.5% or higher - adopted from ADA   consensus guidelines.       Potassium   Date Value Ref Range Status   03/03/2021 4.0 3.4 - 5.3 mmol/L Final     ALT   Date Value Ref Range Status   01/05/2021 19 0 - 70 U/L Final     AST   Date Value Ref Range Status   01/05/2021 12 0 - 45 U/L Final     TSH   Date Value Ref Range Status   03/03/2021 1.87 0.40 - 4.00 mU/L Final     T4 Free   Date Value Ref Range Status   03/03/2021 1.08 0.76 - 1.46 ng/dL Final       Cholesterol   Date Value Ref Range Status   10/29/2020 161 <200 mg/dL Final   09/01/2020 113 <200 mg/dL Final     HDL Cholesterol   Date Value Ref Range Status   10/29/2020 51 >39 mg/dL Final   09/01/2020 28 (L) >39 mg/dL Final     LDL Cholesterol Calculated   Date Value Ref Range Status   10/29/2020 81 <100 mg/dL Final     Comment:     Desirable:       <100 mg/dl   09/01/2020 57 <100 mg/dL Final     Comment:     Desirable:       <100 mg/dl     Triglycerides   Date Value Ref Range Status   10/29/2020 144 <150 mg/dL Final   09/01/2020 143 <150 mg/dL Final     A1C   10.0 %  3/3/2021  A1C   6.2    10/29/2020  A1C   7.8    6/15/2020    ASSESSMENT/PLAN:      1.  TYPE 2 DIABETES MELLITUS: Uncontrolled ype 2  diabetes mellitus.   Pt is unable to afford his insulin and has been taking decrease doses of Novolog.  Paperwork has been submitted for patient assistance for RX expense- will have our staff follow up on this paperwork and check with MSW.  No change in insulin doses today.  Avoid use of Metformin and oral diabetic meds due to CKD and lower GFR.  Pt is unable to afford his DexcomG6 sensor supplies.  Medicare will now cover Freestyle Libre2 sensors- RX sent to patient's local pharmacy.  He has a glucose meter which he is to continue to use to monitor his blood sugar readings for now.  Pt was seen by Oph in Jan 2021 without retinopathy.  He reports mild tingling in his feet.  No foot ulcers at this time.  He is taking ASA daily.    2.  CKD/ANDREW: Most recent creat was 2.09 with GFR 34 mL/min .  Avoid use of Metformin.  Hold on starting SGLT2 at this time due to lower GFR.    3.  HX OF ISCHEMIC CARDIOMYOPATHY: S/P heart transplant on 5/18/2020 and followed closely by transplant staff here.     4.  FOLLOW UP : with me in 1 month.    Total time spent reviewing chart note and labs today = 10 minutes.  Total time for video visit today = 16 minutes.  Total time for documentation today = 14 minutes.    TOTAL TIME FOR VIDEO VISIT TODAY ( 3/18/2021 ) = 40 minutes.    Jeannette Schafer PA-C

## 2021-03-18 NOTE — PROGRESS NOTES
Due to the COVID 19 pandemic this visit was converted to a video visit in order to help prevent spread of infection in this patient and the general population.    Time of start: 10:14 am  Time of end: 10:30 am  Total duration of video visit: 16 minutes.    CHANELL Sharp ( Pepe ) is a 60 year old male with type 2 diabetes mellitus.Video visit today for diabetes care.  Pt was admitted 5/17/2020-5/29/2020 and underwent a heart transplant on 5/18/2020.  Red was also admitted 8/31/2020-9/24/2020 with fever, cough, diarrhea and syncope in setting of increase RADER and was dx having post-obstructive vs apiration pneumonia. Pt also had RUL/suprahilar mass + abiotrophia defectiva with narrowing of multiple RUL bronchil. He was tx with antibiotics with improvement.  Pt's hx is significant for type 2 diabetes mellitus dx 3 years ago, HTN, hyperlipidemia, hx of ischemic heart disease s/p STEMI with ALYSSA 2019, LVAD placement, Stage 3 CKD, and obesity.  Apparently heart donor blood cx and respiratory cultures were + for H flu, staph and strep.  Red was also admitted 5/12-5/15/2020 for ANDREW due to nephrolithiasis complicated by hydronephrosis/ANDREW.  For his diabetes, pt is currently taking Lantus 28 units subcutaneous each pm and prescribed to take Novolog 1 unit/5 gms CHO with meals with correction scale  ( 1 unit/20 for BG > 140 ) and bedtime correction scale ( 1 unit/25 for BG > 200). However, he tells me he is unable to afford his Novolog and has been taking LESS Novolog.  Apparently his paperwork to get insulin via the patient assistance program is still being worked on per patient.  Pt's A1C most recent A1C was high at 10.0 %. His previous A1C  was 6.2 % on 10/29/2020 and A1C was 7.4 % on 8/19/2020. He is anemic.  His insurance will no longer cover his DexcomG6 sensor supplies, so he is not using his Dexcom sensor.  I have no glucose meter download today.  Pt states his blood sugars are in the 200-300 range. No  hypoglycemia.  On ROS today, main complaint is fatigue.  He did walk this past weekend outdoors and felt better.  He still needs evaluation for sleep apnea.  Breathing stable at this time.. No cough, fever or chills.  He has not had the COVID19 vaccine yet.  Mild tingling in both feet. He denies foot ulcers.  Pt denies headaches, blurred vision, n/v, abd pain, diarrhea, dysuria or hematuria.    Diabetes Care  Retinopathy:none; pt seen by Oph in Jan 2021.  Nephropathy:yes- hx of CKD/ANDREW.  Neuropathy: mild tingling in both feet.  Foot Exam:no exam today.  Taking aspirin: yes.  Lipids: LDL 81 on 10/29/2020. Pt is taking Crestor.  CAD: yes; s/p heart transplant on 5/18/2020.  Depression: no.  Insulin: Basal and meal time insulin with correction scale ( 1 unit/20 for BG > 140 with meals and > 200 at bedtime).  Testing: glucose meter. Pt has a DexcomG6 sensor - not using sensor since his insurance will not cover his Dexcom supplies.    ROS  See under HPI.    Allergies  No Known Allergies    Medications  Current Outpatient Medications   Medication Sig Dispense Refill     ACCU-CHEK [place] PLUS test strip Check BG 3 times daily at meals and at bedtime. 300 strip 0     acetaminophen (TYLENOL) 325 MG tablet Take 2 tablets (650 mg) by mouth every 4 hours as needed for other (multimodal surgical pain management along with NSAIDS and opioid medication as indicated based on pain control and physical function.)       amLODIPine (NORVASC) 5 MG tablet Take 1 tablet (5 mg) by mouth daily 90 tablet 3     BD SILVANA U/F 32G X 4 MM insulin pen needle Use 6 times daily. 600 each 3     calcium carbonate 600 mg-vitamin D 400 units (CALTRATE) 600-400 MG-UNIT per tablet Take 1 tablet by mouth 2 times daily (with meals)       everolimus (ZORTRESS) 0.5 MG tablet Take 2 tablets (1 mg) by mouth 2 times daily 120 tablet 11     hydrALAZINE (APRESOLINE) 50 MG tablet Take 2 tablets (100 mg) by mouth 3 times daily 540 tablet 3     insulin glargine (LANTUS  PEN) 100 UNIT/ML pen Inject 28 units subcutaneous at hs. 15 mL 3     magnesium oxide (MAG-OX) 400 (241.3 Mg) MG tablet Take 1 tablet (400 mg) by mouth 2 times daily 90 tablet 3     multivitamin w/minerals (THERA-VIT-M) tablet Take 1 tablet by mouth daily 90 tablet 3     mycophenolate (GENERIC EQUIVALENT) 250 MG capsule Take 4 capsules (1,000 mg) by mouth 2 times daily 240 capsule 11     pantoprazole (PROTONIX) 40 MG EC tablet Take 1 tablet (40 mg) by mouth every morning (before breakfast) 90 tablet 3     rosuvastatin (CRESTOR) 10 MG tablet TAKE ONE TABLET BY MOUTH ONCE DAILY 90 tablet 3     tacrolimus (GENERIC EQUIVALENT) 1 MG capsule Take 4mg (4 caps) in the AM and 4mg (4 caps) in the PM. 240 capsule 3     Continuous Blood Gluc  (DEXCOM G6 ) DON 1 each daily (Patient not taking: Reported on 3/3/2021) 1 Device 1     Continuous Blood Gluc Sensor (DEXCOM G6 SENSOR) MISC 1 each every 10 days (Patient not taking: Reported on 3/3/2021) 9 each 3     Continuous Blood Gluc Transmit (DEXCOM G6 TRANSMITTER) MISC 1 each every 3 months (Patient not taking: Reported on 3/3/2021) 1 each 3     insulin aspart (NOVOLOG PEN) 100 UNIT/ML pen Inject 1-14 units per custom scale  For Pre-Meal BG less than 140, no additional insulin needed   to 159 give 1 units.    to 179 give 2 units.    to 199 give 3 units.    to 219 give 4 units.    to 239 give 5 units.    to 259 give 6 units.    to 279 give 7 units.    to 299 give 8 units.    to 319 give 9 units.    to 339 give 10 units.    to 359 give 11 units.    to 379 give 12 units.   to 399 give 13 units.  BG >/=400 give 14 units. (Patient not taking: Reported on 3/17/2021) 3 mL 0     insulin aspart (NOVOLOG PEN) 100 UNIT/ML pen Inject 1-11 Units Subcutaneous At Bedtime Inject 1-11 units at bedtime per custom scale   For Bedtime BG less than 200, no additional insulin needed   to 219 give 1 units.     to 239 give 2 units.    to 259 give 3 units.    to 279 give 4 units.    to 299 give 5 units.    to 319 give 6 units.    to 339 give 7 units.    to 359 give 8 units    to 379 give 9 units.   to 399 give 10 units.  BG >/=400 give 11 units. 3 mL 0     loperamide (IMODIUM) 2 MG capsule Take 1 capsule (2 mg) by mouth 4 times daily as needed for diarrhea (Patient not taking: Reported on 3/3/2021) 60 capsule 0     NOVOLOG FLEXPEN 100 UNIT/ML soln INJECT 1 TO 14 UNITS UNDER THE SKIN  WITH MEALS AND 1 TO 11 UNITS AT BEDTIME PER SLIDING SCALES, AND INJECT 1 UNIT UNDER THE SKIN PER 5 GRAMS OF CARBOHYDRATES WITH MEALS AND SNACKS (Patient not taking: Reported on 3/17/2021) 15 mL 3       Family History  Mother and father with hx of type 2 DM.  Social History   reports that he has never smoked. He has never used smokeless tobacco. He reports that he does not drink alcohol or use drugs.     Past Medical History  Past Medical History:   Diagnosis Date     Acute kidney injury (H)      CKD (chronic kidney disease)      Coronary artery disease      Diabetes mellitus (H)      HTN (hypertension)      Hyperlipidemia      ICD (implantable cardioverter-defibrillator), single chamber- NOT dependent 7/17/2018     Ischemic cardiomyopathy      LV (left ventricular) mural thrombus      Paroxysmal atrial flutter (H)      RVF (right ventricular failure) (H)      Systolic heart failure (H)      Ventricular tachycardia (H)        Past Surgical History:   Procedure Laterality Date     BRONCHOSCOPY (RIGID OR FLEXIBLE), DIAGNOSTIC N/A 9/15/2020    Procedure: BRONCHOSCOPY, WITH BRONCHOALVEOLAR LAVAGE;  Surgeon: Elder Mejia MD;  Location:  GI     BRONCHOSCOPY (RIGID OR FLEXIBLE), DIAGNOSTIC N/A 9/17/2020    Procedure: BRONCHOSCOPY, WITH BRONCHOALVEOLAR LAVAGE;  Surgeon: Bill Lemus MD;  Location: [facility]     BRONCHOSCOPY RIGID OR FLEXIBLE W/TRANSENDOSCOPIC ENDOBRONCHIAL ULTRASOUND  GUIDED Right 9/8/2020    Procedure: BRONCHOSCOPY, WITH ENDOBRONCHIAL ULTRASOUND;  Surgeon: Donny Mercedes MD;  Location:  GI     COLONOSCOPY N/A 12/7/2018    Procedure: COLONOSCOPY;  Surgeon: Krish Mercado MD;  Location: [facility]     CV HEART BIOPSY N/A 5/24/2020    Procedure: Heart Biopsy;  Surgeon: Kit Bacon MD;  [facility]     CV HEART BIOPSY N/A 6/1/2020    Procedure: CV HEART BIOPSY;  Surgeon: Kit Bacon MD;  [facility]     CV HEART BIOPSY N/A 6/8/2020    Procedure: CV HEART BIOPSY;  Surgeon: Kit Bacon MD;  Location:  [facility]     CV HEART BIOPSY N/A 6/15/2020    Procedure: CV HEART BIOPSY;  Surgeon: Kit Bacon MD;  [facility]     CV HEART BIOPSY N/A 6/30/2020    Procedure: CV HEART BIOPSY;  Surgeon: Kit Bacon MD;  [facility]     CV HEART BIOPSY N/A 7/14/2020    Procedure: CV HEART BIOPSY;  Surgeon: Brian Long MD;  Location:  [facility]     CV HEART BIOPSY N/A 7/29/2020    Procedure: CV HEART BIOPSY;  Surgeon: Momo Hunt MD;  [facility]     CV HEART BIOPSY N/A 8/13/2020    Procedure: CV HEART BIOPSY;  Surgeon: Khris Mcclelland MD;  [facility]     CV HEART BIOPSY N/A 8/26/2020    Procedure: CV HEART BIOPSY;  Surgeon: Momo Hunt MD;  [facility]     CV HEART BIOPSY N/A 9/30/2020    Procedure: CV HEART BIOPSY;  Surgeon: Artemio Ulloa MD;  Location:  [facility]     CV HEART BIOPSY N/A 10/29/2020    Procedure: CV HEART BIOPSY;  Surgeon: Kit Bacon MD;  Location:  [facility]     CV HEART BIOPSY N/A 1/5/2021    Procedure: CV HEART BIOPSY;  Surgeon: Carlin Garcia MD;  Location:  [facility]ATH [facility]     CV RIGHT HEART  CATH MEASUREMENTS RECORDED N/A 2/19/2019    Procedure: RHC;  Surgeon: Brian Long MD;  [facility]     CV RIGHT HEART CATH MEASUREMENTS RECORDED N/A 7/5/2019    Procedure: CV RIGHT HEART CATH;  Surgeon: Khris Mcclelland MD;  [facility]     CV RIGHT HEART CATH MEASUREMENTS RECORDED N/A 5/24/2020    Procedure: Right Heart Cath;  Surgeon: Kit Bacon MD;  [facility]     CV RIGHT HEART CATH MEASUREMENTS RECORDED N/A 6/1/2020    Procedure: CV RIGHT HEART CATH;  Surgeon: Kit Bacon MD;  [facility]     CV RIGHT HEART CATH MEASUREMENTS RECORDED N/A 6/8/2020    Procedure: CV RIGHT HEART CATH;  Surgeon: Kit Bacon MD;  [facility]     CV RIGHT HEART CATH MEASUREMENTS RECORDED N/A 6/15/2020    Procedure: CV RIGHT HEART CATH;  Surgeon: Kit Bacon MD;  [facility]     CV RIGHT HEART CATH MEASUREMENTS RECORDED N/A 6/30/2020    Procedure: CV RIGHT HEART CATH;  Surgeon: Kit Bacon MD;  [facility]     CV RIGHT HEART CATH MEASUREMENTS RECORDED N/A 7/14/2020    Procedure: CV RIGHT HEART CATH;  Surgeon: Brian Long MD;  [facility]     CV RIGHT HEART CATH MEASUREMENTS RECORDED N/A 7/29/2020    Procedure: CV RIGHT HEART CATH;  Surgeon: Momo Hunt MD;  [facility]     CV RIGHT HEART CATH MEASUREMENTS RECORDED N/A 8/13/2020    Procedure: CV RIGHT HEART CATH;  Surgeon: Khris Mcclelland MD;  [facility]     CV RIGHT HEART CATH MEASUREMENTS RECORDED N/A 8/26/2020    Procedure: CV RIGHT HEART CATH;  Surgeon: Momo Hunt MD;  [facility]     CV RIGHT HEART CATH MEASUREMENTS RECORDED N/A 9/30/2020    Procedure: Right Heart Cath;  Surgeon:  Artemio Ulloa MD;  Location:  [facility]ATH [facility]     CV RIGHT HEART CATH MEASUREMENTS RECORDED N/A 10/29/2020    Procedure: CV RIGHT HEART CATH;  Surgeon: Kit Bacon MD;  Location:  [facility]     CV RIGHT HEART CATH MEASUREMENTS RECORDED N/A 1/5/2021    Procedure: CV RIGHT HEART CATH;  Surgeon: Carlin Garcia MD;  Location:  [facility]ATH [facility]     ENDOBRONCHIAL ULTRASOUND FLEXIBLE N/A 9/17/2020    Procedure: BRONCHOSCOPY, flexible, endobronchial ultrasound with transbronchial biopsies, endobronchial biopsies;  Surgeon: Bill Lemus MD;  Location: [facility]     HC PRQ [id number] CORONARY ANGIOPLASTY ADDL BRANCH      PCI and ALYSSA to ostial LAD on 6/27/18     IMPLANT AUTOMATIC IMPLANTABLE CARDIOVERTER DEFIBRILLATOR       INSERT VENTRICULAR ASSIST DEVICE LEFT (HEARTMATE II) N/A 12/31/2018    Procedure: Median Sternotomy, On Cardiopulmonary Bypass Pump, Insertion of Left Ventricular Assist Device (Heartmate III);  Surgeon: Kamaljit Baker MD;  Location: [facility]     PICC DOUBLE LUMEN PLACEMENT Right 05/22/2020    5FR DL PICC inserted at right basilic vein, length 38cm.     TRANSPLANT HEART RECIPIENT AFTER HEART MATE N/A 5/18/2020    Procedure: REDO MEDIAN STERNOTOMY, LYSIS OF ADHESIONS, ON CARDIOPULMONARY BYPASS PUMP, HEART TRANSPLANT RECIPIENT, REMOVAL OF LEFT VENTRICULAR ASSIST DEVICE, REMOVAL OF AUTOMATED IMPLANTABLE CARDIOVERTER DEFIBRILLATOR;  Surgeon: Elder Willis MD;  Location: [facility]       Physical Exam    No exam today.    RESULTS  Creatinine   Date Value Ref Range Status   03/03/2021 2.09 (H) 0.66 - 1.25 mg/dL Final     GFR Estimate   Date Value Ref Range Status   03/03/2021 34 (L) >60 mL/min/[1.73_m2] Final     Comment:     Non  GFR Calc  Starting 12/18/2018, serum creatinine based estimated GFR (eGFR) will be   calculated using the Chronic Kidney Disease Epidemiology Collaboration   (CKD-EPI) equation.       Hemoglobin A1C   Date Value  Ref Range Status   03/03/2021 10.0 (H) 0 - 5.6 % Final     Comment:     Normal <5.7% Prediabetes 5.7-6.4%  Diabetes 6.5% or higher - adopted from ADA   consensus guidelines.       Potassium   Date Value Ref Range Status   03/03/2021 4.0 3.4 - 5.3 mmol/L Final     ALT   Date Value Ref Range Status   01/05/2021 19 0 - 70 U/L Final     AST   Date Value Ref Range Status   01/05/2021 12 0 - 45 U/L Final     TSH   Date Value Ref Range Status   03/03/2021 1.87 0.40 - 4.00 mU/L Final     T4 Free   Date Value Ref Range Status   03/03/2021 1.08 0.76 - 1.46 ng/dL Final       Cholesterol   Date Value Ref Range Status   10/29/2020 161 <200 mg/dL Final   09/01/2020 113 <200 mg/dL Final     HDL Cholesterol   Date Value Ref Range Status   10/29/2020 51 >39 mg/dL Final   09/01/2020 28 (L) >39 mg/dL Final     LDL Cholesterol Calculated   Date Value Ref Range Status   10/29/2020 81 <100 mg/dL Final     Comment:     Desirable:       <100 mg/dl   09/01/2020 57 <100 mg/dL Final     Comment:     Desirable:       <100 mg/dl     Triglycerides   Date Value Ref Range Status   10/29/2020 144 <150 mg/dL Final   09/01/2020 143 <150 mg/dL Final     A1C   10.0 %  3/3/2021  A1C   6.2    10/29/2020  A1C   7.8    6/15/2020    ASSESSMENT/PLAN:      1.  TYPE 2 DIABETES MELLITUS: Uncontrolled ype 2 diabetes mellitus.   Pt is unable to afford his insulin and has been taking decrease doses of Novolog.  Paperwork has been submitted for patient assistance for RX expense- will have our staff follow up on this paperwork and check with MSW.  No change in insulin doses today.  Avoid use of Metformin and oral diabetic meds due to CKD and lower GFR.  Pt is unable to afford his DexcomG6 sensor supplies.  Medicare will now cover Freestyle Libre2 sensors- RX sent to patient's local pharmacy.  He has a glucose meter which he is to continue to use to monitor his blood sugar readings for now.  Pt was seen by Oph in Jan 2021 without retinopathy.  He reports mild  tingling in his feet.  No foot ulcers at this time.  He is taking ASA daily.    2.  CKD/ANDREW: Most recent creat was 2.09 with GFR 34 mL/min .  Avoid use of Metformin.  Hold on starting SGLT2 at this time due to lower GFR.    3.  HX OF ISCHEMIC CARDIOMYOPATHY: S/P heart transplant on 5/18/2020 and followed closely by transplant staff here.     4.  FOLLOW UP : with me in 1 month.    Total time spent reviewing chart note and labs today = 10 minutes.  Total time for video visit today = 16 minutes.  Total time for documentation today = 14 minutes.    TOTAL TIME FOR VIDEO VISIT TODAY ( 3/18/2021 ) = 40 minutes.    Jeannette Schafer PA-C

## 2021-03-19 NOTE — TELEPHONE ENCOUNTER
VM left with pt asking if he received the [employer] assistance application we sent to him in the mail.  Writer requested pt call back if he did not receive.

## 2021-03-23 ENCOUNTER — TELEPHONE (OUTPATIENT)
Dept: TRANSPLANT | Facility: CLINIC | Age: 61
End: 2021-03-23

## 2021-03-23 NOTE — TELEPHONE ENCOUNTER
Writer called pt to make sure he received [employer] assistance paperwork.  Pt states he received it, and will work on filling it out this week.

## 2021-04-06 ENCOUNTER — TELEPHONE (OUTPATIENT)
Dept: TRANSPLANT | Facility: CLINIC | Age: 61
End: 2021-04-06

## 2021-04-06 ENCOUNTER — VIRTUAL VISIT (OUTPATIENT)
Dept: INFECTIOUS DISEASES | Facility: CLINIC | Age: 61
End: 2021-04-06
Attending: INTERNAL MEDICINE
Payer: COMMERCIAL

## 2021-04-06 DIAGNOSIS — J69.0 ASPIRATION PNEUMONIA OF RIGHT MIDDLE LOBE, UNSPECIFIED ASPIRATION PNEUMONIA TYPE (H): ICD-10-CM

## 2021-04-06 DIAGNOSIS — Z23 NEED FOR VACCINATION: ICD-10-CM

## 2021-04-06 DIAGNOSIS — R91.8 MASS OF UPPER LOBE OF RIGHT LUNG: Primary | ICD-10-CM

## 2021-04-06 DIAGNOSIS — Z94.1 HEART REPLACED BY TRANSPLANT (H): ICD-10-CM

## 2021-04-06 PROCEDURE — 99212 OFFICE O/P EST SF 10 MIN: CPT | Mod: 95 | Performed by: INTERNAL MEDICINE

## 2021-04-06 ASSESSMENT — PAIN SCALES - GENERAL: PAINLEVEL: MILD PAIN (3)

## 2021-04-06 NOTE — PROGRESS NOTES
[facility]    Transplant Infectious Diseases Outpatient Progress note      Patient:  Mariusz Sharp, Date of birth 1960, Medical record number [id number]  Date of Visit:  04/06/2021         Recommendations:   1. Continue off of ABx.   2. Needs the shingrix vaccine series and the 23-valent pneumovax 2 weeks after the second dose of Pfizer COVID-19 vaccine.     RTC: as needed.          Summary of Presentation:   Transplants:  5/18/2020 (Heart), Postoperative day:  323     This patient is a 59 year old male with ICMP s/p OHT basiliximab for induction, TAC/MMF for maintenance IS. In the process of switching to TAC/everolimus.   RUL pneumonia/mass in 9/2020.         Active Problems and Infectious Diseases Issues:   1. Likely aspiration pneumonia vs postobstructive pneumonia  The patient finished a total of 4 weeks of ABx on 10/11/2020. CT chest showed persistent improvement of the RUL infiltrate including a CT done in 1/2021, 3 months after stopping ABx.   Has been off of ABx for 6 months with almost resolution of cough and other symptoms.  Will keep off of ABx and no indications for repeat CT chest.      Historically:   The BALs on 9/8/2020, 9/15/2020 and 9/17/2020 were all unremarkable except for the growth of Abiotrophia defectiva (oral simona).   The TBBx were all non-diagnostic/unremarkable.         Old Problems and Infectious Diseases Issues:   1. 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   2. C acnes in the extracted VAD; treated with vancomycin then doxycycline.   3. Was diagnosed with possible chronic prostatitis and was treated with ABx for longer than one month between 8/2020 and 10/2020.     Other Infectious Disease issues include:  - QTc 475 as of 9/16/2020.   - PCP prophylaxis: bactrim caused hyperkalemia, then pentamidine. Now off PJP ppx.   - Serostatus: CMV D+/R-, EBV D-/R+, HSV1?/2?, VZV +, Toxo D-/R-     On VGCV ppx  "per protocol.   - immunization: Needs the shingrix vaccine series and the 23-valent pneumovax 2 weeks after last COVID-19 vaccine.   - Gamma globulin status: 510 as of 9/13/2020       Attestation:  Total duration of visit including chart review, reviewing labs and imaging, interviewing and examining the patient, documentation, and sending communication to the patient and to the primary treating team: 10 minutes.     Tavares Rodriguez MD   Pager: [phone]  04/06/2021         Interim History:   \"I barely cough anymore\". No fever or chills, no difficulty breathing. Using Slim Fast and walking to lose weight.   No other complaints.         HPI:       Transplants:  5/18/2020 (Heart); Postoperative day:  323  In summary:  Presented to the hospital late 8/2020 with 24 hr of dyspnea, fever, and cough with post tussive LOC.   He was found to have RUL consolidation vs obstructive LN vs mass.   Non-invasive workup with urine and blood Histo and Blasto Ag were negative, A galactomannan negative, BD glucan low positive at 86 and 90.  He was treated with posaconazole empirically from 9/4/2020 till 9/13/202 when he spiked fever and CT showed worsening of the mass/consolidation. Zosyn was initiated on 9/13/2020 for possible post obstructive pneumonia with resolution of fever and posaconazole was discontinued due to lack of effectiveness.   Bronchoscopy with TBBx on 9/8/2020 and 9/15/2020 were both not diagnostic. FNA on 9/17/202 and BAL the same day were also not significant.   The biopsy from 9/8/2020 was also sent for NGS by PCR of 28S DNA and was negative for fungal infection.   EBV was not detected.     Given the significant improvement with zosyn, it was believed that the RUL infiltrate was due to aspiration or postobstructive pneumonia. He was discharged on cipro and clindamycin PO till 10/11/2020 for total of 4 weeks.            Immunizations:     Immunization History   Administered Date(s) Administered     HepA-Adult " 06/14/2019, 01/03/2020     HepB-Adult 06/07/2019, 07/11/2019, 01/03/2020     Mantoux Tuberculin Skin Test 01/12/2019     Pneumo Conj 13-V (2010&after) 06/07/2019     TDAP Vaccine (Boostrix) 08/28/2007, 06/07/2019             Allergies:   No Known Allergies          Medications:     Current Outpatient Medications   Medication Sig     ACCU-CHEK CHARLOTTE PLUS test strip Check BG 3 times daily at meals and at bedtime.     acetaminophen (TYLENOL) 325 MG tablet Take 2 tablets (650 mg) by mouth every 4 hours as needed for other (multimodal surgical pain management along with NSAIDS and opioid medication as indicated based on pain control and physical function.)     amLODIPine (NORVASC) 5 MG tablet Take 1 tablet (5 mg) by mouth daily     BD SILVANA U/F 32G X 4 MM insulin pen needle Use 6 times daily.     calcium carbonate 600 mg-vitamin D 400 units (CALTRATE) 600-400 MG-UNIT per tablet Take 1 tablet by mouth 2 times daily (with meals)     Continuous Blood Gluc  (DEXCOM G6 ) DON 1 each daily (Patient not taking: Reported on 3/3/2021)     Continuous Blood Gluc  (FREESTYLE DARRELL 2 READER) DON 1 Device 4 times daily (before meals and nightly)     Continuous Blood Gluc Sensor (DEXCOM G6 SENSOR) MISC 1 each every 10 days (Patient not taking: Reported on 3/3/2021)     Continuous Blood Gluc Sensor (FREESTYLE DARRELL 2 SENSOR) MISC 1 Application every 14 days     Continuous Blood Gluc Transmit (DEXCOM G6 TRANSMITTER) MISC 1 each every 3 months (Patient not taking: Reported on 3/3/2021)     everolimus (ZORTRESS) 0.5 MG tablet Take 2 tablets (1 mg) by mouth 2 times daily     hydrALAZINE (APRESOLINE) 50 MG tablet Take 2 tablets (100 mg) by mouth 3 times daily     insulin aspart (NOVOLOG PEN) 100 UNIT/ML pen Inject 1-14 units per custom scale  For Pre-Meal BG less than 140, no additional insulin needed   to 159 give 1 units.    to 179 give 2 units.    to 199 give 3 units.    to 219 give 4 units.     to 239 give 5 units.    to 259 give 6 units.    to 279 give 7 units.    to 299 give 8 units.    to 319 give 9 units.    to 339 give 10 units.    to 359 give 11 units.    to 379 give 12 units.   to 399 give 13 units.  BG >/=400 give 14 units. (Patient not taking: Reported on 3/17/2021)     insulin aspart (NOVOLOG PEN) 100 UNIT/ML pen Inject 1-11 Units Subcutaneous At Bedtime Inject 1-11 units at bedtime per custom scale   For Bedtime BG less than 200, no additional insulin needed   to 219 give 1 units.    to 239 give 2 units.    to 259 give 3 units.    to 279 give 4 units.    to 299 give 5 units.    to 319 give 6 units.    to 339 give 7 units.    to 359 give 8 units    to 379 give 9 units.   to 399 give 10 units.  BG >/=400 give 11 units.     insulin glargine (LANTUS PEN) 100 UNIT/ML pen Inject 28 units subcutaneous at hs.     loperamide (IMODIUM) 2 MG capsule Take 1 capsule (2 mg) by mouth 4 times daily as needed for diarrhea (Patient not taking: Reported on 3/3/2021)     magnesium oxide (MAG-OX) 400 (241.3 Mg) MG tablet Take 1 tablet (400 mg) by mouth 2 times daily     multivitamin w/minerals (THERA-VIT-M) tablet Take 1 tablet by mouth daily     mycophenolate (GENERIC EQUIVALENT) 250 MG capsule Take 4 capsules (1,000 mg) by mouth 2 times daily     NOVOLOG FLEXPEN 100 UNIT/ML soln INJECT 1 TO 14 UNITS UNDER THE SKIN  WITH MEALS AND 1 TO 11 UNITS AT BEDTIME PER SLIDING SCALES, AND INJECT 1 UNIT UNDER THE SKIN PER 5 GRAMS OF CARBOHYDRATES WITH MEALS AND SNACKS (Patient not taking: Reported on 3/17/2021)     pantoprazole (PROTONIX) 40 MG EC tablet Take 1 tablet (40 mg) by mouth every morning (before breakfast)     rosuvastatin (CRESTOR) 10 MG tablet TAKE ONE TABLET BY MOUTH ONCE DAILY     tacrolimus (GENERIC EQUIVALENT) 1 MG capsule Take 4mg (4 caps) in the AM and 4mg (4 caps) in the PM.     No current  facility-administered medications for this visit.             Review of Systems:   As mentioned in the interim history otherwise negative by reviewing constitutional symptoms, central and peripheral neurological systems, respiratory system, cardiac system, GI system,  system, musculoskeletal, skin, allergy, and lymphatics.                  Physical Exam:   There were no vitals taken for this visit. it was a virtual visit.   He did not cough during this virtual visit, he did not sound short of breath. He did not look pale.            Laboratory Data:     Absolute CD4   Date Value Ref Range Status   09/12/2020 359 (L) 441 - 2,156 cells/uL Final       Inflammatory Markers    Recent Labs   Lab Test 07/23/18  0645   CRP 11.0*       Immune Globulin Studies      Recent Labs   Lab Test 09/13/20  0612   *   IGM 29*   *          Metabolic Studies    Recent Labs   Lab Test 03/03/21  0858 01/05/21  0832 10/29/20  0955 09/30/20  0835 09/24/20  0451 09/23/20  0531 09/13/20  0804 09/13/20  0804 09/01/20  0658 09/01/20  0658 06/15/20  0826 06/15/20  0826    140 137 136 136 135   < >  --    < > 134   < > 139   POTASSIUM 4.0 4.8 4.1 4.6 5.0 5.0   < >  --    < > 3.8   < > 4.8   CHLORIDE 107 108 106 108 109 107   < >  --    < > 104   < > 110*   CO2 23 27 23 20 19* 20   < >  --    < > 22   < > 21   ANIONGAP 8 5 8 8 8 8   < >  --    < > 9   < > 8   BUN 52* 48* 44* 40* 35* 35*   < >  --    < > 21   < > 98*   CR 2.09* 2.01* 1.89* 1.92* 1.70* 1.72*   < >  --    < > 1.38*   < > 1.89*   GFRESTIMATED 34* 35* 38* 38* 44* 43*   < >  --    < > 56*   < > 38*   * 171* 157* 172* 107* 180*   < >  --    < > 118*   < > 120*   A1C 10.0*  --  6.2*  --   --   --   --   --   --   --    < > 7.8*   ARLENE 9.3 9.4 9.2 9.2 9.0 9.2   < >  --    < > 8.5   < > 9.0   PHOS 3.8 3.0 4.1 4.3 4.1 3.3   < >  --   --   --    < > 4.4   MAG 1.7 1.7 1.5* 1.5* 2.0 2.0   < >  --    < > 1.4*   < > 1.7   LACT  --   --   --   --   --   --   --   0.7  --  0.9   < >  --    CKT  --   --  69  --   --   --   --   --   --   --   --  132    < > = values in this interval not displayed.       Hepatic Studies    Recent Labs   Lab Test 01/05/21  0832 10/29/20  0955 09/30/20  0835 09/24/20  0451 09/22/20  0530 09/21/20  0527 09/12/20  1204 09/12/20  1204 09/01/20  0658 09/01/20  0658   BILITOTAL 0.6 0.8 0.5 0.3 0.5 0.4   < >  --    < > 0.9   ALKPHOS 85 64 69 75 84 84   < >  --    < > 62   PROTTOTAL 7.5 7.5 7.0 6.0* 6.1* 6.0*   < >  --    < > 6.1*   ALBUMIN 3.8 4.0 3.1* 2.3* 2.3* 2.1*   < >  --    < > 2.4*   AST 12 22 19 30 38 52*   < >  --    < > 16   ALT 19 17 23 46 69 82*   < >  --    < > 11   LDH  --   --   --   --   --   --   --  380*  --  228*    < > = values in this interval not displayed.       Hematology Studies     Recent Labs   Lab Test 03/03/21  0858 01/05/21  0832 10/29/20  0955 09/30/20  0835 09/24/20  0451 09/23/20  0531 09/21/20  0527 09/05/20  0549 09/05/20  0549 09/04/20  0543 09/03/20  0526 09/02/20  0551   WBC 6.4 6.2 2.1* 4.4 3.0* 3.7* 3.3*   < > 5.3 3.8* 3.1* 2.8*   ANEU  --   --  1.0*  --   --   --  2.2  --  4.1 3.1 2.4 1.8   ALYM  --   --  0.8  --   --   --  0.7*  --  0.8 0.3* 0.3* 0.7*   MARTHA  --   --  0.2  --   --   --  0.2  --  0.3 0.2 0.2 0.2   AEOS  --   --  0.0  --   --   --  0.0  --  0.0 0.2 0.1 0.1   HGB 10.7* 11.1* 10.3* 8.9* 8.2* 9.1* 8.1*   < > 8.3* 8.7* 8.3* 8.1*   HCT 32.9* 34.2* 32.8* 29.1* 26.8* 30.1* 26.7*   < > 26.5* 27.5* 27.0* 26.7*    277 225 230 307 345 348   < > 353 376 333 354    < > = values in this interval not displayed.       Clotting Studies    Recent Labs   Lab Test 08/26/20  0939 06/04/20  0900 06/01/20  0856 05/18/20  1835 05/18/20  1630 05/18/20  1248 05/18/20  0010   INR 1.06 1.16* 1.16* 1.54* 2.21* 1.42* 2.19*   PTT  --   --   --  31 31 34 38*       Urine Studies    Recent Labs   Lab Test 03/03/21  1200 09/16/20  1620 09/14/20  2020 08/31/20  1615 08/20/20  1630 08/18/20  1404   URINEPH 5.0 5.5 5.5 5.5 5.5  5.5   NITRITE Negative Negative Negative Negative Negative Negative   LEUKEST Negative Trace* Negative Negative Small* Moderate*   WBCU  --  5 4 1 12* 28*         Microbiology:  Last 6 Culture results with specimen source  Culture Micro   Date Value Ref Range Status   09/17/2020 No Actinomyces species isolated  Final   09/17/2020 Culture negative for acid fast bacilli  Final   09/17/2020   Final    Assayed at [facility] [address] [place] [id number] [phone]   09/17/2020 Culture negative after 4 weeks  Final   09/17/2020 No Legionella species isolated  Final   09/17/2020 No growth after 4 weeks  Final   09/17/2020 (A)  Final    On day 3, isolated in broth only:  Abiotrophia defectiva  Susceptibility testing not routinely done      Specimen Description   Date Value Ref Range Status   09/17/2020 Lung Right upper lobe Lesion  Final   09/17/2020 Lung Right upper lobe Lesion  Final   09/17/2020 Lung Right upper lobe Lesion  Final   09/17/2020 Lung Right upper lobe Lesion  Final   09/17/2020 Lung Right upper lobe Lesion  Final   09/17/2020 Lung Right upper lobe Lesion  Final   09/17/2020 Lung Right upper lobe Lesion  Final   09/17/2020 Lung Right upper lobe Lesion  Final   09/17/2020 Lung Right upper lobe Lesion  Final        Last check of C difficile  C Diff Toxin B PCR   Date Value Ref Range Status   09/17/2020 Negative NEG^Negative Final     Comment:     Negative: C. difficile target DNA sequences NOT detected, presumed negative   for C.difficile toxin B or the number of bacteria present may be below the   limit of detection for the test.  FDA approved assay performed using [employer] GeneXpert real-time PCR.  A negative result does not exclude actual disease due to C. difficile and may   be due to improper collection, handling and storage of the specimen or the   number of organisms in the specimen is below the detection limit of the assay.           Virology:  CMV viral loads    CMV viral loads    CMV  DNA Quant (External)   Date Value Ref Range Status   11/12/2020 Undetected Undetected IU/mL Final     CMV viral loads    Recent Labs   Lab Test 11/12/20  1514 10/29/20  0955 09/13/20  0612   CSPEC  --  Plasma Plasma   CMVLOG  --  Not Calculated Not Calculated   49480 Undetected  --   --        CMV viral loads    Log IU/mL of CMVQNT   Date Value Ref Range Status   10/29/2020 Not Calculated <2.1 [Log_IU]/mL Final   09/13/2020 Not Calculated <2.1 [Log_IU]/mL Final   09/01/2020 Not Calculated <2.1 [Log_IU]/mL Final   08/19/2020 Not Calculated <2.1 [Log_IU]/mL Final   08/13/2020 Not Calculated <2.1 [Log_IU]/mL Final   06/15/2020 Not Calculated <2.1 [Log_IU]/mL Final     CMV DNA Quant (External)   Date Value Ref Range Status   11/12/2020 Undetected Undetected IU/mL Final       CMV resistance testing  No lab results found.  No results found for: CMVCID, CMVFOS, CMVGAN     EBV viral loads     Recent Labs   Lab Test 10/29/20  0955 09/01/20  0658 08/13/20  0840 06/15/20  0826   EBRES EBV DNA Not Detected EBV DNA Not Detected EBV DNA Not Detected EBV DNA Not Detected     EBV DNA Copies/mL   Date Value Ref Range Status   10/29/2020 EBV DNA Not Detected EBVNEG^EBV DNA Not Detected [Copies]/mL Final   09/01/2020 EBV DNA Not Detected EBVNEG^EBV DNA Not Detected [Copies]/mL Final   08/13/2020 EBV DNA Not Detected EBVNEG^EBV DNA Not Detected [Copies]/mL Final   06/15/2020 EBV DNA Not Detected EBVNEG^EBV DNA Not Detected [Copies]/mL Final       Human Herpes Virus 6 viral loads    No results found for: H6RES No results found for: H6SPEC    CMV Antibody IgG   Date Value Ref Range Status   05/18/2020 0.3 0.0 - 0.8 AI Final     Comment:     Negative  Antibody index (AI) values reflect qualitative changes in antibody   concentration that cannot be directly associated with clinical condition or   disease state.       Toxoplasma Antibody IgG   Date Value Ref Range Status   05/18/2020 <3.0 0.0 - 7.1 IU/mL Final     Comment:     Negative-  Absence of detectable Toxoplasma gondii IgG antibodies. A negative   result does not rule out acute infection.  The magnitude of the measured result is not indicative of the amount of   antibody present. The concentrations of anti-Toxoplasma gondii IgG in a given   specimen determined with assays from different manufacturers can vary due to   differences in assay methods and reagent specificity.         Pathology:  RUL FNA 9/17/2020  Lung, right upper lobe lesion, endobronchial ultrasound guided fine needle    aspiration:   - Negative for malignancy   TBBx 9/8/2020  FINAL DIAGNOSIS:   A. No specimen received.   B. LUNG, RIGHT UPPER LOBE, ENDOBRONCHIAL BIOPSIES:   - Two small fragments of respiratory mucosa with chronic inflammation,   frequent eosinophils, submucosal edema   and focal squamous metaplasia.   - Negative for granulomatous inflammation or malignancy in this biopsy,       Imaging:  CT chest 1/5/2021   IMPRESSION:   1.  Continuing decrease in size of right hilar soft tissue attenuating  mass with less pronounced mass effect on the right upper lobe  bronchus, bronchus intermedius and right middle lobe bronchus.  Multiple nonenlarged mediastinal nodes are likely reactive in  etiology. Given that patient is a transplant recipient and chronically  immunosuppressed, following these findings until resolution may be  considered.   2.  Atherosclerosis.  3.  Cholelithiasis.  CT chest 10/29/2020  IMPRESSION:  1. Grossly unchanged right perihilar consolidative mass/soft tissue  thickening with improved mass effect on the right mainstem bronchus  and right upper lobe bronchi. Additionally, there is mildly improved  prominent mediastinal lymph nodes and right hilar lymphadenopathy.  These findings are most consistent with improving  infectious/postinfectious etiology. Recommend follow up to resolution  as malignancy is not entirely excluded.  2. Resolution of previous right lower lobe bronchocentric  reticulonodular  opacities.  3. Additional incisional/chronic findings as noted above, not  significantly changed from 10/2/2020.  CT chest 10/2/2020  IMPRESSION:  Abnormalities are stable to slightly improved from  9/21/2020.  CT 9/21/2020 reviewed with the radiologist  Impression: Positive treatment response as evidenced by:  1. Moderate decrease in size of right suprahilar mass. This mass has  aggressive features including mediastinal and bronchial invasion as  well as mediastinal lymphadenopathy. These invasive features have  improved.  2. Previously occluded right upper lobe bronchi are now patent,  although remains mildly narrowed.  3. Improvement and bronchocentric groundglass and nodular opacities in  the right lower lobe favored to be infectious.  4. Near complete resolution of right-sided pleural effusion with  resolution of intralobular septal thickening in the right lower lobe.  CT chest 9/13/2020  IMPRESSION:  1. Increased size of the right hilar mass/consolidation with  associated narrowing of the subsegmental right upper lobe bronchioles  and multiple scattered pulmonary nodules as above. Differential  continues to include infection versus malignancy with postobstructive  pneumonia.  2. Increased bibasilar groundglass and right lung tree-in-bud nodular  opacities suggestive of infection, possibly secondary to aspiration.  Small right pleural effusion.  3. Surgical changes of cardiac transplantation.  4. Cholelithiasis without cholecystitis.  CT chest 9/2/2020   IMPRESSION:   1. New mass vs consolidation in the right upper lobe and right hilum  since 7/29/2020 with narrowing of multiple right upper lobe bronchi.   1a. Differential pneumonia with reactive right hilar lymphadenopathy  vs. malignancy(ex. Lymphoma/PTLD) with post obstructive pneumonia.  2. Stable additional pulmonary lesions as above.  3. Stable postsurgical changes of heart transplant.  4. Cholelithiasis without evidence of cholecystitis.

## 2021-04-06 NOTE — LETTER
4/6/2021       RE: Mariusz Sharp  [address] [facility] [address]     Dear Colleague,    Thank you for referring your patient, Mariusz Sharp, to the [facility] [place] at [facility]. Please see a copy of my visit note below.    [facility]    Transplant Infectious Diseases Outpatient Progress note      Patient:  Mariusz Sharp, Date of birth 1960, Medical record number [id number]  Date of Visit:  04/06/2021         Recommendations:   1. Continue off of ABx.   2. Needs the shingrix vaccine series and the 23-valent pneumovax 2 weeks after the second dose of Pfizer COVID-19 vaccine.     RTC: as needed.          Summary of Presentation:   Transplants:  5/18/2020 (Heart), Postoperative day:  323     This patient is a 59 year old male with ICMP s/p OHT basiliximab for induction, TAC/MMF for maintenance IS. In the process of switching to TAC/everolimus.   RUL pneumonia/mass in 9/2020.         Active Problems and Infectious Diseases Issues:   1. Likely aspiration pneumonia vs postobstructive pneumonia  The patient finished a total of 4 weeks of ABx on 10/11/2020. CT chest showed persistent improvement of the RUL infiltrate including a CT done in 1/2021, 3 months after stopping ABx.   Has been off of ABx for 6 months with almost resolution of cough and other symptoms.  Will keep off of ABx and no indications for repeat CT chest.      Historically:   The BALs on 9/8/2020, 9/15/2020 and 9/17/2020 were all unremarkable except for the growth of Abiotrophia defectiva (oral simona).   The TBBx were all non-diagnostic/unremarkable.         Old Problems and Infectious Diseases Issues:   1. 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   2. C acnes in the extracted VAD; treated with vancomycin then doxycycline.   3. Was diagnosed with possible chronic prostatitis  "and was treated with ABx for longer than one month between 8/2020 and 10/2020.     Other Infectious Disease issues include:  - QTc 475 as of 9/16/2020.   - PCP prophylaxis: bactrim caused hyperkalemia, then pentamidine. Now off PJP ppx.   - Serostatus: CMV D+/R-, EBV D-/R+, HSV1?/2?, VZV +, Toxo D-/R-     On VGCV ppx per protocol.   - immunization: Needs the shingrix vaccine series and the 23-valent pneumovax 2 weeks after last COVID-19 vaccine.   - Gamma globulin status: 510 as of 9/13/2020       Attestation:  Total duration of visit including chart review, reviewing labs and imaging, interviewing and examining the patient, documentation, and sending communication to the patient and to the primary treating team: 10 minutes.     Tavares Rodriguez MD   Pager: [phone]  04/06/2021         Interim History:   \"I barely cough anymore\". No fever or chills, no difficulty breathing. Using Slim Fast and walking to lose weight.   No other complaints.         HPI:       Transplants:  5/18/2020 (Heart); Postoperative day:  323  In summary:  Presented to the hospital late 8/2020 with 24 hr of dyspnea, fever, and cough with post tussive LOC.   He was found to have RUL consolidation vs obstructive LN vs mass.   Non-invasive workup with urine and blood Histo and Blasto Ag were negative, A galactomannan negative, BD glucan low positive at 86 and 90.  He was treated with posaconazole empirically from 9/4/2020 till 9/13/202 when he spiked fever and CT showed worsening of the mass/consolidation. Zosyn was initiated on 9/13/2020 for possible post obstructive pneumonia with resolution of fever and posaconazole was discontinued due to lack of effectiveness.   Bronchoscopy with TBBx on 9/8/2020 and 9/15/2020 were both not diagnostic. FNA on 9/17/202 and BAL the same day were also not significant.   The biopsy from 9/8/2020 was also sent for NGS by PCR of 28S DNA and was negative for fungal infection.   EBV was not detected.     Given the " significant improvement with zosyn, it was believed that the RUL infiltrate was due to aspiration or postobstructive pneumonia. He was discharged on cipro and clindamycin PO till 10/11/2020 for total of 4 weeks.            Immunizations:     Immunization History   Administered Date(s) Administered     HepA-Adult 06/14/2019, 01/03/2020     HepB-Adult 06/07/2019, 07/11/2019, 01/03/2020     Mantoux Tuberculin Skin Test 01/12/2019     Pneumo Conj 13-V (2010&after) 06/07/2019     TDAP Vaccine (Boostrix) 08/28/2007, 06/07/2019             Allergies:   No Known Allergies          Medications:     Current Outpatient Medications   Medication Sig     ACCU-CHEK CHARLOTTE PLUS test strip Check BG 3 times daily at meals and at bedtime.     acetaminophen (TYLENOL) 325 MG tablet Take 2 tablets (650 mg) by mouth every 4 hours as needed for other (multimodal surgical pain management along with NSAIDS and opioid medication as indicated based on pain control and physical function.)     amLODIPine (NORVASC) 5 MG tablet Take 1 tablet (5 mg) by mouth daily     BD SILVANA U/F 32G X 4 MM insulin pen needle Use 6 times daily.     calcium carbonate 600 mg-vitamin D 400 units (CALTRATE) 600-400 MG-UNIT per tablet Take 1 tablet by mouth 2 times daily (with meals)     Continuous Blood Gluc  (DEXCOM G6 ) DON 1 each daily (Patient not taking: Reported on 3/3/2021)     Continuous Blood Gluc  (FREESTYLE DARRELL 2 READER) DON 1 Device 4 times daily (before meals and nightly)     Continuous Blood Gluc Sensor (DEXCOM G6 SENSOR) MISC 1 each every 10 days (Patient not taking: Reported on 3/3/2021)     Continuous Blood Gluc Sensor (FREESTYLE DARRELL 2 SENSOR) MISC 1 Application every 14 days     Continuous Blood Gluc Transmit (DEXCOM G6 TRANSMITTER) MISC 1 each every 3 months (Patient not taking: Reported on 3/3/2021)     everolimus (ZORTRESS) 0.5 MG tablet Take 2 tablets (1 mg) by mouth 2 times daily     hydrALAZINE (APRESOLINE) 50 MG  tablet Take 2 tablets (100 mg) by mouth 3 times daily     insulin aspart (NOVOLOG PEN) 100 UNIT/ML pen Inject 1-14 units per custom scale  For Pre-Meal BG less than 140, no additional insulin needed   to 159 give 1 units.    to 179 give 2 units.    to 199 give 3 units.    to 219 give 4 units.    to 239 give 5 units.    to 259 give 6 units.    to 279 give 7 units.    to 299 give 8 units.    to 319 give 9 units.    to 339 give 10 units.    to 359 give 11 units.    to 379 give 12 units.   to 399 give 13 units.  BG >/=400 give 14 units. (Patient not taking: Reported on 3/17/2021)     insulin aspart (NOVOLOG PEN) 100 UNIT/ML pen Inject 1-11 Units Subcutaneous At Bedtime Inject 1-11 units at bedtime per custom scale   For Bedtime BG less than 200, no additional insulin needed   to 219 give 1 units.    to 239 give 2 units.    to 259 give 3 units.    to 279 give 4 units.    to 299 give 5 units.    to 319 give 6 units.    to 339 give 7 units.    to 359 give 8 units    to 379 give 9 units.   to 399 give 10 units.  BG >/=400 give 11 units.     insulin glargine (LANTUS PEN) 100 UNIT/ML pen Inject 28 units subcutaneous at hs.     loperamide (IMODIUM) 2 MG capsule Take 1 capsule (2 mg) by mouth 4 times daily as needed for diarrhea (Patient not taking: Reported on 3/3/2021)     magnesium oxide (MAG-OX) 400 (241.3 Mg) MG tablet Take 1 tablet (400 mg) by mouth 2 times daily     multivitamin w/minerals (THERA-VIT-M) tablet Take 1 tablet by mouth daily     mycophenolate (GENERIC EQUIVALENT) 250 MG capsule Take 4 capsules (1,000 mg) by mouth 2 times daily     NOVOLOG FLEXPEN 100 UNIT/ML soln INJECT 1 TO 14 UNITS UNDER THE SKIN  WITH MEALS AND 1 TO 11 UNITS AT BEDTIME PER SLIDING SCALES, AND INJECT 1 UNIT UNDER THE SKIN PER 5 GRAMS OF CARBOHYDRATES WITH MEALS AND SNACKS (Patient not taking: Reported  on 3/17/2021)     pantoprazole (PROTONIX) 40 MG EC tablet Take 1 tablet (40 mg) by mouth every morning (before breakfast)     rosuvastatin (CRESTOR) 10 MG tablet TAKE ONE TABLET BY MOUTH ONCE DAILY     tacrolimus (GENERIC EQUIVALENT) 1 MG capsule Take 4mg (4 caps) in the AM and 4mg (4 caps) in the PM.     No current facility-administered medications for this visit.             Review of Systems:   As mentioned in the interim history otherwise negative by reviewing constitutional symptoms, central and peripheral neurological systems, respiratory system, cardiac system, GI system,  system, musculoskeletal, skin, allergy, and lymphatics.                  Physical Exam:   There were no vitals taken for this visit. it was a virtual visit.   He did not cough during this virtual visit, he did not sound short of breath. He did not look pale.            Laboratory Data:     Absolute CD4   Date Value Ref Range Status   09/12/2020 359 (L) 441 - 2,156 cells/uL Final       Inflammatory Markers    Recent Labs   Lab Test 07/23/18  0645   CRP 11.0*       Immune Globulin Studies      Recent Labs   Lab Test 09/13/20  0612   *   IGM 29*   *          Metabolic Studies    Recent Labs   Lab Test 03/03/21  0858 01/05/21  0832 10/29/20  0955 09/30/20  0835 09/24/20  0451 09/23/20  0531 09/13/20  0804 09/13/20  0804 09/01/20  0658 09/01/20  0658 06/15/20  0826 06/15/20  0826    140 137 136 136 135   < >  --    < > 134   < > 139   POTASSIUM 4.0 4.8 4.1 4.6 5.0 5.0   < >  --    < > 3.8   < > 4.8   CHLORIDE 107 108 106 108 109 107   < >  --    < > 104   < > 110*   CO2 23 27 23 20 19* 20   < >  --    < > 22   < > 21   ANIONGAP 8 5 8 8 8 8   < >  --    < > 9   < > 8   BUN 52* 48* 44* 40* 35* 35*   < >  --    < > 21   < > 98*   CR 2.09* 2.01* 1.89* 1.92* 1.70* 1.72*   < >  --    < > 1.38*   < > 1.89*   GFRESTIMATED 34* 35* 38* 38* 44* 43*   < >  --    < > 56*   < > 38*   * 171* 157* 172* 107* 180*   < >  --     < > 118*   < > 120*   A1C 10.0*  --  6.2*  --   --   --   --   --   --   --    < > 7.8*   ARLENE 9.3 9.4 9.2 9.2 9.0 9.2   < >  --    < > 8.5   < > 9.0   PHOS 3.8 3.0 4.1 4.3 4.1 3.3   < >  --   --   --    < > 4.4   MAG 1.7 1.7 1.5* 1.5* 2.0 2.0   < >  --    < > 1.4*   < > 1.7   LACT  --   --   --   --   --   --   --  0.7  --  0.9   < >  --    CKT  --   --  69  --   --   --   --   --   --   --   --  132    < > = values in this interval not displayed.       Hepatic Studies    Recent Labs   Lab Test 01/05/21  0832 10/29/20  0955 09/30/20  0835 09/24/20  0451 09/22/20  0530 09/21/20  0527 09/12/20  1204 09/12/20  1204 09/01/20  0658 09/01/20  0658   BILITOTAL 0.6 0.8 0.5 0.3 0.5 0.4   < >  --    < > 0.9   ALKPHOS 85 64 69 75 84 84   < >  --    < > 62   PROTTOTAL 7.5 7.5 7.0 6.0* 6.1* 6.0*   < >  --    < > 6.1*   ALBUMIN 3.8 4.0 3.1* 2.3* 2.3* 2.1*   < >  --    < > 2.4*   AST 12 22 19 30 38 52*   < >  --    < > 16   ALT 19 17 23 46 69 82*   < >  --    < > 11   LDH  --   --   --   --   --   --   --  380*  --  228*    < > = values in this interval not displayed.       Hematology Studies     Recent Labs   Lab Test 03/03/21  0858 01/05/21  0832 10/29/20  0955 09/30/20  0835 09/24/20  0451 09/23/20  0531 09/21/20  0527 09/05/20  0549 09/05/20  0549 09/04/20  0543 09/03/20  0526 09/02/20  0551   WBC 6.4 6.2 2.1* 4.4 3.0* 3.7* 3.3*   < > 5.3 3.8* 3.1* 2.8*   ANEU  --   --  1.0*  --   --   --  2.2  --  4.1 3.1 2.4 1.8   ALYM  --   --  0.8  --   --   --  0.7*  --  0.8 0.3* 0.3* 0.7*   MARTHA  --   --  0.2  --   --   --  0.2  --  0.3 0.2 0.2 0.2   AEOS  --   --  0.0  --   --   --  0.0  --  0.0 0.2 0.1 0.1   HGB 10.7* 11.1* 10.3* 8.9* 8.2* 9.1* 8.1*   < > 8.3* 8.7* 8.3* 8.1*   HCT 32.9* 34.2* 32.8* 29.1* 26.8* 30.1* 26.7*   < > 26.5* 27.5* 27.0* 26.7*    277 225 230 307 345 348   < > 353 376 333 354    < > = values in this interval not displayed.       Clotting Studies    Recent Labs   Lab Test 08/26/20  0939 06/04/20  0900  06/01/20  0856 05/18/20  1835 05/18/20  1630 05/18/20  1248 05/18/20  0010   INR 1.06 1.16* 1.16* 1.54* 2.21* 1.42* 2.19*   PTT  --   --   --  31 31 34 38*       Urine Studies    Recent Labs   Lab Test 03/03/21  1200 09/16/20  1620 09/14/20 2020 08/31/20  1615 08/20/20  1630 08/18/20  1404   URINEPH 5.0 5.5 5.5 5.5 5.5 5.5   NITRITE Negative Negative Negative Negative Negative Negative   LEUKEST Negative Trace* Negative Negative Small* Moderate*   WBCU  --  5 4 1 12* 28*         Microbiology:  Last 6 Culture results with specimen source  Culture Micro   Date Value Ref Range Status   09/17/2020 No Actinomyces species isolated  Final   09/17/2020 Culture negative for acid fast bacilli  Final   09/17/2020   Final    Assayed at [facility] [address] [place] [id number] [phone]   09/17/2020 Culture negative after 4 weeks  Final   09/17/2020 No Legionella species isolated  Final   09/17/2020 No growth after 4 weeks  Final   09/17/2020 (A)  Final    On day 3, isolated in broth only:  Abiotrophia defectiva  Susceptibility testing not routinely done      Specimen Description   Date Value Ref Range Status   09/17/2020 Lung Right upper lobe Lesion  Final   09/17/2020 Lung Right upper lobe Lesion  Final   09/17/2020 Lung Right upper lobe Lesion  Final   09/17/2020 Lung Right upper lobe Lesion  Final   09/17/2020 Lung Right upper lobe Lesion  Final   09/17/2020 Lung Right upper lobe Lesion  Final   09/17/2020 Lung Right upper lobe Lesion  Final   09/17/2020 Lung Right upper lobe Lesion  Final   09/17/2020 Lung Right upper lobe Lesion  Final        Last check of C difficile  C Diff Toxin B PCR   Date Value Ref Range Status   09/17/2020 Negative NEG^Negative Final     Comment:     Negative: C. difficile target DNA sequences NOT detected, presumed negative   for C.difficile toxin B or the number of bacteria present may be below the   limit of detection for the test.  FDA approved assay performed using wavecatch  GeneXpert real-time PCR.  A negative result does not exclude actual disease due to C. difficile and may   be due to improper collection, handling and storage of the specimen or the   number of organisms in the specimen is below the detection limit of the assay.           Virology:  CMV viral loads    CMV viral loads    CMV DNA Quant (External)   Date Value Ref Range Status   11/12/2020 Undetected Undetected IU/mL Final     CMV viral loads    Recent Labs   Lab Test 11/12/20  1514 10/29/20  0955 09/13/20  0612   CSPEC  --  Plasma Plasma   CMVLOG  --  Not Calculated Not Calculated   [id number] Undetected  --   --        CMV viral loads    Log IU/mL of CMVQNT   Date Value Ref Range Status   10/29/2020 Not Calculated <2.1 [Log_IU]/mL Final   09/13/2020 Not Calculated <2.1 [Log_IU]/mL Final   09/01/2020 Not Calculated <2.1 [Log_IU]/mL Final   08/19/2020 Not Calculated <2.1 [Log_IU]/mL Final   08/13/2020 Not Calculated <2.1 [Log_IU]/mL Final   06/15/2020 Not Calculated <2.1 [Log_IU]/mL Final     CMV DNA Quant (External)   Date Value Ref Range Status   11/12/2020 Undetected Undetected IU/mL Final       CMV resistance testing  No lab results found.  No results found for: CMVCID, CMVFOS, CMVGAN     EBV viral loads     Recent Labs   Lab Test 10/29/20  0955 09/01/20  0658 08/13/20  0840 06/15/20  0826   EBRES EBV DNA Not Detected EBV DNA Not Detected EBV DNA Not Detected EBV DNA Not Detected     EBV DNA Copies/mL   Date Value Ref Range Status   10/29/2020 EBV DNA Not Detected EBVNEG^EBV DNA Not Detected [Copies]/mL Final   09/01/2020 EBV DNA Not Detected EBVNEG^EBV DNA Not Detected [Copies]/mL Final   08/13/2020 EBV DNA Not Detected EBVNEG^EBV DNA Not Detected [Copies]/mL Final   06/15/2020 EBV DNA Not Detected EBVNEG^EBV DNA Not Detected [Copies]/mL Final       Human Herpes Virus 6 viral loads    No results found for: H6RES No results found for: H6SPEC    CMV Antibody IgG   Date Value Ref Range Status   05/18/2020 0.3 0.0 - 0.8 AI  Final     Comment:     Negative  Antibody index (AI) values reflect qualitative changes in antibody   concentration that cannot be directly associated with clinical condition or   disease state.       Toxoplasma Antibody IgG   Date Value Ref Range Status   05/18/2020 <3.0 0.0 - 7.1 IU/mL Final     Comment:     Negative- Absence of detectable Toxoplasma gondii IgG antibodies. A negative   result does not rule out acute infection.  The magnitude of the measured result is not indicative of the amount of   antibody present. The concentrations of anti-Toxoplasma gondii IgG in a given   specimen determined with assays from different manufacturers can vary due to   differences in assay methods and reagent specificity.         Pathology:  RUL FNA 9/17/2020  Lung, right upper lobe lesion, endobronchial ultrasound guided fine needle    aspiration:   - Negative for malignancy   TBBx 9/8/2020  FINAL DIAGNOSIS:   A. No specimen received.   B. LUNG, RIGHT UPPER LOBE, ENDOBRONCHIAL BIOPSIES:   - Two small fragments of respiratory mucosa with chronic inflammation,   frequent eosinophils, submucosal edema   and focal squamous metaplasia.   - Negative for granulomatous inflammation or malignancy in this biopsy,       Imaging:  CT chest 1/5/2021   IMPRESSION:   1.  Continuing decrease in size of right hilar soft tissue attenuating  mass with less pronounced mass effect on the right upper lobe  bronchus, bronchus intermedius and right middle lobe bronchus.  Multiple nonenlarged mediastinal nodes are likely reactive in  etiology. Given that patient is a transplant recipient and chronically  immunosuppressed, following these findings until resolution may be  considered.   2.  Atherosclerosis.  3.  Cholelithiasis.  CT chest 10/29/2020  IMPRESSION:  1. Grossly unchanged right perihilar consolidative mass/soft tissue  thickening with improved mass effect on the right mainstem bronchus  and right upper lobe bronchi. Additionally, there is  mildly improved  prominent mediastinal lymph nodes and right hilar lymphadenopathy.  These findings are most consistent with improving  infectious/postinfectious etiology. Recommend follow up to resolution  as malignancy is not entirely excluded.  2. Resolution of previous right lower lobe bronchocentric  reticulonodular opacities.  3. Additional incisional/chronic findings as noted above, not  significantly changed from 10/2/2020.  CT chest 10/2/2020  IMPRESSION:  Abnormalities are stable to slightly improved from  9/21/2020.  CT 9/21/2020 reviewed with the radiologist  Impression: Positive treatment response as evidenced by:  1. Moderate decrease in size of right suprahilar mass. This mass has  aggressive features including mediastinal and bronchial invasion as  well as mediastinal lymphadenopathy. These invasive features have  improved.  2. Previously occluded right upper lobe bronchi are now patent,  although remains mildly narrowed.  3. Improvement and bronchocentric groundglass and nodular opacities in  the right lower lobe favored to be infectious.  4. Near complete resolution of right-sided pleural effusion with  resolution of intralobular septal thickening in the right lower lobe.  CT chest 9/13/2020  IMPRESSION:  1. Increased size of the right hilar mass/consolidation with  associated narrowing of the subsegmental right upper lobe bronchioles  and multiple scattered pulmonary nodules as above. Differential  continues to include infection versus malignancy with postobstructive  pneumonia.  2. Increased bibasilar groundglass and right lung tree-in-bud nodular  opacities suggestive of infection, possibly secondary to aspiration.  Small right pleural effusion.  3. Surgical changes of cardiac transplantation.  4. Cholelithiasis without cholecystitis.  CT chest 9/2/2020   IMPRESSION:   1. New mass vs consolidation in the right upper lobe and right hilum  since 7/29/2020 with narrowing of multiple right upper  lobe bronchi.   1a. Differential pneumonia with reactive right hilar lymphadenopathy  vs. malignancy(ex. Lymphoma/PTLD) with post obstructive pneumonia.  2. Stable additional pulmonary lesions as above.  3. Stable postsurgical changes of heart transplant.  4. Cholelithiasis without evidence of cholecystitis.    Red is a 60 year old who is being evaluated via a billable video visit.      How would you like to obtain your AVS? MyChart  If the video visit is dropped, the invitation should be resent by: Send to e-mail at: [email or web address]  Will anyone else be joining your video visit? No      Video Start Time: 3:00 PM  Video-Visit Details    Type of service:  Video Visit    Video End Time:3:09 PM    Originating Location (pt. Location): Home    Distant Location (provider location):  [facility] [place]     Platform used for Video Visit: [employer]

## 2021-04-06 NOTE — PATIENT INSTRUCTIONS
It was nice to see you today Mr. Sharp and I am glad you continue to improve.   As we discussed during the visit, 2 weeks after last dose of the COVID-19 vaccine, you should start the shingles vaccine series (shingrix), also you should receive the 23-valent pneumovax. Please discuss this with your primary care physician or transplant coordinator.     Tavares Rodriguez MD

## 2021-04-06 NOTE — TELEPHONE ENCOUNTER
Writer received fax from [employer] assistance program- pt is now enrolled.  Writer called pt to let him know how obtain medication from [employer].  Instructed pt to call coordinator when he receives medication so we can go over how to transition.  Pt states understanding instructions and plan of care.

## 2021-04-06 NOTE — PROGRESS NOTES
Red is a 60 year old who is being evaluated via a billable video visit.      How would you like to obtain your AVS? MyChart  If the video visit is dropped, the invitation should be resent by: Send to e-mail at: [email or web address]  Will anyone else be joining your video visit? No      Video Start Time: 3:00 PM  Video-Visit Details    Type of service:  Video Visit    Video End Time:3:09 PM    Originating Location (pt. Location): Home    Distant Location (provider location):  [facility] [place]     Platform used for Video Visit: [employer]

## 2021-04-13 ENCOUNTER — TELEPHONE (OUTPATIENT)
Dept: TRANSPLANT | Facility: CLINIC | Age: 61
End: 2021-04-13

## 2021-04-13 NOTE — TELEPHONE ENCOUNTER
Patient Call: refill issue with brand name or generic re:  Everolimus / Zortress       Call back needed? Yes    Return Call Needed  Same as documented in contacts section  When to return call?: Same day: [facility] [employer]

## 2021-04-13 NOTE — TELEPHONE ENCOUNTER
Returned call to pt regarding Everolimus Rx.  Per pt, [employer] needs Rx clarified.  Writer called [employer], and spoke with Pharmacist Miriam Borjas.  Rx had not been keyed in yet by their team.  She sent stat request, and pt should receive a call later today regarding shipment.    Coordinator called pt with above information, and instructed him to call back if he does not hear from [employer] regarding shipment by the end of the day.

## 2021-04-16 ENCOUNTER — TELEPHONE (OUTPATIENT)
Dept: UROLOGY | Facility: CLINIC | Age: 61
End: 2021-04-16

## 2021-04-16 NOTE — TELEPHONE ENCOUNTER
Left message for pt to call and schedule with Dr. Carlos for a VV on 4/19/21 at 3:30PM to discuss stone. Okay to schedule this date and time per [place].       Pt also needs to schedule a 6 month f/u with Dr. Gomes with a PSA prior.

## 2021-04-21 ENCOUNTER — TELEPHONE (OUTPATIENT)
Dept: TRANSPLANT | Facility: CLINIC | Age: 61
End: 2021-04-21

## 2021-04-21 DIAGNOSIS — Z94.1 STATUS POST HEART TRANSPLANTATION (H): Primary | ICD-10-CM

## 2021-04-21 DIAGNOSIS — E11.9 DIABETES MELLITUS, TYPE 2 (H): ICD-10-CM

## 2021-04-21 NOTE — TELEPHONE ENCOUNTER
VM left with to inquire as to whether or not he has received his Everolimus shipment from [employer].  Requested pt call back to confirm.

## 2021-04-21 NOTE — LETTER
[facility] [phone] (Phone)  [phone] (Fax)       2021    Patient Name: Mariusz Sharp   :  1960   MRN: [id number]  ICD10:  z94.1 , z79.899    Subject: Request for Labs    Mariusz Sharp is a heart transplant patient currently being followed by the [facility].  We would like to request your assistance in obtaining the following laboratory tests which are part of our routine surveillance program for heart transplant recipients.  (Items needed are checked.)    Please fax the lab results as soon as they are available to:   Thoracic Transplant Department  Fax Number:  [phone]     Item Frequency   X CBC with platelets  Weekly in April and May 2021   X Basic metabolic panel (including:  BUN,   Serum Creatinine, Sodium, Potassium, Chloride,   CO2, Calcium, Magnesium, Phosphorus, and   Glucose) Weekly in April and May 2021   X Everolimus and  Tacrolimus/Prograf level  (Should be mailed to the [facility] in the  provided to you by the patient.) Weekly in April and May 2021     Thank you  for your continued support and the opportunity to collaborate in the care of this patient.  If you have any questions, please call the Thoracic Transplant Team at [phone] or [phone].    .

## 2021-04-21 NOTE — TELEPHONE ENCOUNTER
Pt returned call to coordinator regarding Everolimus transition.  Pt has received med in the mail from [facility].    Pt instructed to do the following:  Take last dose of MMF this evening and then discontinue.  Tomorrow AM- start taking Everolimus 1mg BID.  Continue taking Tacrolimus as ordered.  Have your BMP, Tacro , and Everolimus levels check in one week.    Pt able to repeat back instructions and states understanding.

## 2021-04-22 DIAGNOSIS — Z94.1 STATUS POST HEART TRANSPLANTATION (H): Primary | ICD-10-CM

## 2021-04-29 DIAGNOSIS — Z94.1 HEART REPLACED BY TRANSPLANT (H): ICD-10-CM

## 2021-04-29 PROCEDURE — 80197 ASSAY OF TACROLIMUS: CPT | Performed by: INTERNAL MEDICINE

## 2021-04-29 PROCEDURE — 80169 DRUG ASSAY EVEROLIMUS: CPT | Performed by: INTERNAL MEDICINE

## 2021-04-30 LAB
TACROLIMUS BLD-MCNC: 6 UG/L (ref 5–15)
TME LAST DOSE: NORMAL H

## 2021-05-03 ENCOUNTER — TELEPHONE (OUTPATIENT)
Dept: TRANSPLANT | Facility: CLINIC | Age: 61
End: 2021-05-03

## 2021-05-03 DIAGNOSIS — Z94.1 STATUS POST HEART TRANSPLANTATION (H): ICD-10-CM

## 2021-05-03 LAB
EVEROLIMUS BLD-MCNC: 4.7 UG/L (ref 3–8)
TME LAST DOSE: NORMAL H

## 2021-05-03 RX ORDER — EVEROLIMUS 0.5 MG/1
TABLET ORAL
Qty: 120 TABLET | Refills: 11 | Status: SHIPPED | OUTPATIENT
Start: 2021-05-03 | End: 2021-05-05

## 2021-05-03 NOTE — TELEPHONE ENCOUNTER
Pt called with the following results:     everolimus level 4.7. Goal 6-8. Confirmed 12 hour trough. No other labs completed. Current dose 1 mg twice a day. Instructions given to increase to 2 mg in am and 1 mg in pm. Repeat in 1 week.    Pt reminded to have bmp drawn next time as well. Standing orders in. Medlist updated.     Pt verbalized understanding to the above instructions.

## 2021-05-05 ENCOUNTER — TELEPHONE (OUTPATIENT)
Dept: TRANSPLANT | Facility: CLINIC | Age: 61
End: 2021-05-05

## 2021-05-05 DIAGNOSIS — Z94.1 STATUS POST HEART TRANSPLANTATION (H): ICD-10-CM

## 2021-05-05 RX ORDER — EVEROLIMUS 0.5 MG/1
TABLET ORAL
Qty: 180 TABLET | Refills: 11
Start: 2021-05-05 | End: 2021-05-12

## 2021-05-05 NOTE — TELEPHONE ENCOUNTER
Pt wondering with his new dose of 2 mg AM and 1 mg PM  If he can go 1.5 mg bid  He has 0.5 mg tablets

## 2021-05-05 NOTE — LETTER
May 5, 2021      Patient Name:  Mariusz Sharp  :  1960  MRN:  [id number]   ICD10: z94.1, z79.899    everolimus (ZORTRESS) 0.5 MG tablet 180 tablet 11 refills 2021  --   Sig - Route: Take 1.5 tablets (1.5 mg) by mouth 2 times daily - Oral   Sent to pharmacy as: Everolimus 0.5 MG Oral Tablet (ZORTRESS)   Class: E-Prescribe   Notes to Pharmacy: TXP DT 2020 (Heart) TXP Dischg DT 2020 DX Heart transplant Z94.1 TX Center [facility] ([place], [place])         .

## 2021-05-05 NOTE — PROGRESS NOTES
ANNIE images obtained.   CLINICAL NUTRITION SERVICES - ASSESSMENT NOTE     Nutrition Prescription    RECOMMENDATIONS FOR MDs/PROVIDERS TO ORDER:  None at this time    Malnutrition Status:    Unable to determine due to no NFPA. Provider may request NFPA.     Recommendations already ordered by Registered Dietitian (RD):  Continue current diet order.  Nutrition Education, see intervention section below.     Future/Additional Recommendations:  Monitor PO intake and wt trends.    Patient is currently considered a person under investigation or positive for COVID-19.    Unable to obtain in-person nutrition history or nutrition focused physical assessment (NFPA) from patient as the number of staff going into rooms is restricted to limit exposure and to minimize use of PPE.    REASON FOR ASSESSMENT  Mariusz Sharp is a/an 59 year old male assessed by the dietitian for Provider Order - 59 yom recent OHTx 5/2020 here w PNA, found to have uric acid in UA. Req eval and education on low uric acid diet     NUTRITION HISTORY  Pt was last seen by RD on 5/21 regarding education for post-tx.     Writer spoke w/ eRd via room phone. Red reports that his appetite has been good since admit and he has been eating 3 meals a day. PTA he was eating ok, usually 3 meals a day. Red reports some taste changes after transplant. Red reports that his weight loss since May is probably due to how he was feeling after transplant (had some nausea and early satiety intermittently). Red reports that his appetite seems to be improving.     Red reports that he has a 2.4 cm kidney stone currently, he reports a potential operation for this in September. Pt reports no formal education regarding low purine diet.     CURRENT NUTRITION ORDERS  Diet: Low Saturated Fat/2400 mg Sodium  Intake/Tolerance: 100% intake per flowsheet. Pt reports no questions or concerns regarding diet or meal ordering.     LABS  Labs reviewed  Cr 1.27 (H)  Uric acid 5.2 (WNL)  Uric acid crystals- few  "    MEDICATIONS  Medications reviewed  Caltrate- BID  Novolog- 1 unit per 8 g CHO  Lantus pen 28 units at bedtime   Prednisone   Tacrolimus   Valcyte     ANTHROPOMETRICS  Height: 162.6 cm (5' 4\")  Most Recent Weight: 80.5 kg (177 lb 6.4 oz)(standing)    Dry weight: 79.8 kg on 8/18   IBW: 59 kg (136%)   BMI: Obesity Grade I BMI 30-34.9  Weight History: Wt loss of 6.9 kg (7.9%) over the past 3 months.   Wt Readings from Last 10 Encounters:   08/21/20 80.5 kg (177 lb 6.4 oz)   07/29/20 80.7 kg (178 lb)   06/30/20 83.6 kg (184 lb 3.2 oz)   06/15/20 83.7 kg (184 lb 9.6 oz)   06/08/20 82.1 kg (181 lb)   06/01/20 84.8 kg (187 lb)   05/29/20 87.4 kg (192 lb 9.6 oz)   05/15/20 86.5 kg (190 lb 11.2 oz)   04/29/20 88.9 kg (196 lb)   12/04/19 91.3 kg (201 lb 4.8 oz)     Dosing Weight: 64 kg (adjusted based on IBW and dry wt of 79.8 kg on 8/18)     ASSESSED NUTRITION NEEDS  Estimated Energy Needs: 9462-6685 kcals/day (25 - 30 kcals/kg)  Justification: Repletion and obese status   Estimated Protein Needs: 60-75 grams protein/day (1 - 1.2 grams of pro/kg)  Justification: Preservation of LBM, low end given presence of uric acid crystals in UA  Estimated Fluid Needs: (1 mL/kcal+)   Justification: Maintenance and Per provider pending fluid status    PHYSICAL FINDINGS  Patient is currently considered a person under investigation or positive for COVID-19.    Unable to obtain in-person nutrition history or nutrition focused physical assessment (NFPA) from patient as the number of staff going into rooms is restricted to limit exposure and to minimize use of PPE.    Red reports that he has noticed some muscle loss, reports that his quads in particular seem smaller.     MALNUTRITION  % Intake: No decreased intake noted  % Weight Loss: > 7.5% in 3 months (severe)  Subcutaneous Fat Loss: Unable to assess  Muscle Loss: Unable to assess, however pt did report some muscle loss he has noticed.   Fluid Accumulation/Edema: Unable to " assess  Malnutrition Diagnosis: Unable to determine due to no NFPA. Provider may request NFPA.     NUTRITION DIAGNOSIS  Food- and nutrition-related knowledge deficit r/t no previous knowledge of low purine diet as evidenced by patient verbalization.     INTERVENTIONS  Implementation  Nutrition Education: Discussed that a low purine diet can help with high uric acid levels and kidney stones. Discussed limiting alcohol, keeping meat portions to 3 oz, eating low fat foods, drinking adequate water, and exercising as able. Also discussed staying away from organ meats, anchovies, sardines, shellfish, fried foods, and gravies made from meat.   Encouraged pt to continue to eat meals TID.     Goals  1. Patient to consume % of nutritionally adequate meal trays TID, or the equivalent with supplements/snacks.  2. No further wt loss (wt >80 kg).      Monitoring/Evaluation  Progress toward goals will be monitored and evaluated per protocol.    Pat Flores RD, LD  6B RD pager [phone]

## 2021-05-05 NOTE — TELEPHONE ENCOUNTER
Returned call to pt regarding Everolimus dosing.  OK to take 1.5mg BID instead of 2 in am and 1 in pm.  Pt states he is having his level rechecked tomorrow AM.  Med sheet updated.

## 2021-05-06 NOTE — PROGRESS NOTES
PT WOULD LIKE TO  RESCHEDULE FOR FIRST WEEK IN June 2021.  HE WILL BE AT THE [facility] LATE MAY AND WILL STOP BY OUR CLINIC TO HAVE HIS GLUCOSE METER DOWNLOADED.  HE HAS NO BLOOD SUGAR DATA FOR ME TODAY.   HE HAS ALSO BEEN WITHOUT MEAL TIME INSULIN FOR 1 MONTH.  I ASKED OUR NURSE TO CHECK INTO WHY PT HAS NOT RECEIVED HIS NOVOLOG.  NO CHARGE TODAY.  GLADIS HERNANDEZ PA-C

## 2021-05-07 ENCOUNTER — TELEPHONE (OUTPATIENT)
Dept: ENDOCRINOLOGY | Facility: CLINIC | Age: 61
End: 2021-05-07

## 2021-05-07 ENCOUNTER — VIRTUAL VISIT (OUTPATIENT)
Dept: ENDOCRINOLOGY | Facility: CLINIC | Age: 61
End: 2021-05-07
Payer: COMMERCIAL

## 2021-05-07 ENCOUNTER — TELEPHONE (OUTPATIENT)
Dept: TRANSPLANT | Facility: CLINIC | Age: 61
End: 2021-05-07

## 2021-05-07 DIAGNOSIS — Z94.1 HEART REPLACED BY TRANSPLANT (H): ICD-10-CM

## 2021-05-07 DIAGNOSIS — Z79.4 TYPE 2 DIABETES MELLITUS WITH HYPERGLYCEMIA, WITH LONG-TERM CURRENT USE OF INSULIN (H): Primary | ICD-10-CM

## 2021-05-07 DIAGNOSIS — E11.65 TYPE 2 DIABETES MELLITUS WITH HYPERGLYCEMIA, WITH LONG-TERM CURRENT USE OF INSULIN (H): Primary | ICD-10-CM

## 2021-05-07 PROCEDURE — 80197 ASSAY OF TACROLIMUS: CPT | Performed by: INTERNAL MEDICINE

## 2021-05-07 PROCEDURE — 99207 PR NO BILLABLE SERVICE THIS VISIT: CPT | Performed by: PHYSICIAN ASSISTANT

## 2021-05-07 PROCEDURE — 80169 DRUG ASSAY EVEROLIMUS: CPT | Performed by: INTERNAL MEDICINE

## 2021-05-07 NOTE — TELEPHONE ENCOUNTER
----- Message from Jeannette Schafer PA-C sent at 5/7/2021 10:40 AM CDT -----  Pt has not received his meal time insulin yet . No Novolog or Humalog now for 1 months.  Could you please check into this and call patient with update.  Thanks Jaimie.  Leticia

## 2021-05-07 NOTE — LETTER
5/7/2021       RE: Mariusz Sharp  [address] [facility] [address]     Dear Colleague,    Thank you for referring your patient, Mariusz Sharp, to the [facility] [place] [facility]. Please see a copy of my visit note below.      PT WOULD LIKE TO  RESCHEDULE FOR FIRST WEEK IN June 2021.  HE WILL BE AT THE [facility] LATE MAY AND WILL STOP BY OUR CLINIC TO HAVE HIS GLUCOSE METER DOWNLOADED.  HE HAS NO BLOOD SUGAR DATA FOR ME TODAY.   HE HAS ALSO BEEN WITHOUT MEAL TIME INSULIN FOR 1 MONTH.  I ASKED OUR NURSE TO CHECK INTO WHY PT HAS NOT RECEIVED HIS NOVOLOG.  NO CHARGE TODAY.  GLADIS HERNANDEZ PA-C

## 2021-05-07 NOTE — TELEPHONE ENCOUNTER
"Spoke w/ Pt: states he filled out a form for Dr Fry regarding his insulin. They had sent the form in but there was a \"back up\" and is still waiting.    Spoke w/ Jazz: care team of Dr. Milad Fry family practice doctor at [facility] in [place], [place]. [facility] [address] [place] [address] Phone: [phone].  States: sent application to [employer] 04/29/2021 for Lantus. They have not submitted an application for fast acting insulin.     Dafne Sanjuanita notified to initiate patient assistance for novolog. May submit for humalog if patient assistance is easier to obtain. Please notify ENDO clinic.     Provider notified.   Jaimie Pizarro RN on 5/7/2021 at 10:58 AM       Jeannette Raines PA-C Miller, Deanne M, RN             Pt has not received his meal time insulin yet . No Novolog or Humalog now for 1 months.   Could you please check into this and call patient with update.            "

## 2021-05-07 NOTE — TELEPHONE ENCOUNTER
Patient Call: Voicemail  Date/Time: 5/7/21 @ 8:54 am  Reason for call: patient stated he is on his way to get his drug level drawn and was not sure of the other test he needed to have drawn.

## 2021-05-07 NOTE — TELEPHONE ENCOUNTER
Returned call to pt regarding labs.  Lab at [facility] has orders for CBC, BMP, Evero, and Tacro levels.  Will update pt with results when available.

## 2021-05-10 DIAGNOSIS — Z94.1 HEART REPLACED BY TRANSPLANT (H): Primary | ICD-10-CM

## 2021-05-10 LAB
TACROLIMUS BLD-MCNC: 4.5 UG/L (ref 5–15)
TME LAST DOSE: ABNORMAL H

## 2021-05-11 DIAGNOSIS — Z11.59 ENCOUNTER FOR SCREENING FOR OTHER VIRAL DISEASES: ICD-10-CM

## 2021-05-12 ENCOUNTER — TELEPHONE (OUTPATIENT)
Dept: TRANSPLANT | Facility: CLINIC | Age: 61
End: 2021-05-12

## 2021-05-12 DIAGNOSIS — Z94.1 HEART REPLACED BY TRANSPLANT (H): Primary | ICD-10-CM

## 2021-05-12 DIAGNOSIS — Z94.1 STATUS POST HEART TRANSPLANTATION (H): ICD-10-CM

## 2021-05-12 LAB
EVEROLIMUS BLD-MCNC: 5.4 UG/L (ref 3–8)
TME LAST DOSE: NORMAL H

## 2021-05-12 RX ORDER — EVEROLIMUS 0.5 MG/1
TABLET ORAL
Qty: 210 TABLET | Refills: 11
Start: 2021-05-12 | End: 2021-07-30

## 2021-05-12 NOTE — LETTER
May 12, 2021      Patient Name:  Mariusz Sharp  :  1960  MRN:  [id number]   ICD10: z94.1, z79.899    everolimus (ZORTRESS) 0.5 MG tablet 210 tablet 11 Refills Start:  2021     Sig - Route: Take 4 tablets (2 mg) by mouth every morning AND 3 tablets (1.5 mg) every evening. - Oral   Class: No Print Out   Notes to Pharmacy: TXP DT 2020 (Heart) TXP Dischg DT 2020 DX Heart transplant Z94.1 TX Center [facility] ([place], [place])   Order: [id number]           .

## 2021-05-12 NOTE — TELEPHONE ENCOUNTER
Provider notified.   Pt Assistance for humalog sent per Dafne Gann.   Jaimie Pizarro RN on 5/12/2021 at 12:21 PM

## 2021-05-12 NOTE — TELEPHONE ENCOUNTER
Pt called with IMS level results.  Tacrolimus level 4.5.  Goal 4-6.  No dose changes at this time.  Everolimus level 5.4.  Goal 6-8.  Pt to increase dose to 2mg in the AM and 1.5mg in the PM.  Pt to recheck levels on Monday 5/17.  Orders placed and lab letter sent to Jessica in [place].  Pt states understanding plan of care and instructions.

## 2021-05-12 NOTE — LETTER
[facility] [phone] (Phone)  [phone] (Fax)       May 12, 2021    Patient Name: Mariusz Sharp   :  1960   MRN: [id number]  ICD10:  z94.1 , z79.899    Subject: Request for Labs    Mariusz Sharp is a heart transplant patient currently being followed by the [facility].  We would like to request your assistance in obtaining the following laboratory tests which are part of our routine surveillance program for heart transplant recipients.  (Items needed are checked.)    Please fax the lab results as soon as they are available to:   Thoracic Transplant Department  Fax Number:  [phone]     Item Frequency   X CBC with platelets  Weekly in April and May 2021   X Basic metabolic panel (including:  BUN,   Serum Creatinine, Sodium, Potassium, Chloride,   CO2, Calcium, Magnesium, Phosphorus, and   Glucose) Weekly in April and May 2021   X Everolimus and  Tacrolimus/Prograf level  (Should be mailed to the [facility] in the  provided to you by the patient.) Weekly in April and May 2021     Thank you  for your continued support and the opportunity to collaborate in the care of this patient.  If you have any questions, please call the Thoracic Transplant Team at [phone] or [phone].    .

## 2021-05-12 NOTE — TELEPHONE ENCOUNTER
Mailed out the forms for [employer] humalog. [employer] approval rate is higher so the chances on getting humalog approved is better. Left message for Red to call back of any questions. Also left my number in the mail packet that was sent out if he has any questions.

## 2021-05-13 ENCOUNTER — TELEPHONE (OUTPATIENT)
Dept: TRANSPLANT | Facility: CLINIC | Age: 61
End: 2021-05-13

## 2021-05-13 NOTE — TELEPHONE ENCOUNTER
[facility] lab called.  Requested lab to fax pt's CBC and BMP results to us that were drawn this week.  Raegan in the lab states she will fax today.

## 2021-05-14 ENCOUNTER — TELEPHONE (OUTPATIENT)
Dept: TRANSPLANT | Facility: CLINIC | Age: 61
End: 2021-05-14

## 2021-05-14 NOTE — TELEPHONE ENCOUNTER
Called lab re this week's results - states pt was no show for appt yesterday.   Pt to have labs drawn on Monday 5/17

## 2021-05-18 ENCOUNTER — PRE VISIT (OUTPATIENT)
Dept: TRANSPLANT | Facility: CLINIC | Age: 61
End: 2021-05-18

## 2021-05-18 DIAGNOSIS — E11.9 DIABETES MELLITUS, TYPE 2 (H): ICD-10-CM

## 2021-05-18 DIAGNOSIS — Z94.1 HEART REPLACED BY TRANSPLANT (H): Primary | ICD-10-CM

## 2021-05-18 DIAGNOSIS — Z12.5 SCREENING PSA (PROSTATE SPECIFIC ANTIGEN): ICD-10-CM

## 2021-05-18 DIAGNOSIS — Z13.220 LIPID SCREENING: ICD-10-CM

## 2021-05-18 RX ORDER — POTASSIUM CHLORIDE 1500 MG/1
40 TABLET, EXTENDED RELEASE ORAL
Status: CANCELLED | OUTPATIENT
Start: 2021-05-18

## 2021-05-18 RX ORDER — LIDOCAINE 40 MG/G
CREAM TOPICAL
Status: CANCELLED | OUTPATIENT
Start: 2021-05-18

## 2021-05-18 RX ORDER — SODIUM CHLORIDE 9 MG/ML
INJECTION, SOLUTION INTRAVENOUS CONTINUOUS
Status: CANCELLED | OUTPATIENT
Start: 2021-05-18

## 2021-05-18 RX ORDER — POTASSIUM CHLORIDE 1500 MG/1
20 TABLET, EXTENDED RELEASE ORAL
Status: CANCELLED | OUTPATIENT
Start: 2021-05-18

## 2021-05-19 ENCOUNTER — TELEPHONE (OUTPATIENT)
Dept: TRANSPLANT | Facility: CLINIC | Age: 61
End: 2021-05-19

## 2021-05-19 DIAGNOSIS — Z94.1 STATUS POST HEART TRANSPLANTATION (H): ICD-10-CM

## 2021-05-19 RX ORDER — TACROLIMUS 1 MG/1
CAPSULE ORAL
Qty: 180 CAPSULE | Refills: 11 | Status: SHIPPED | OUTPATIENT
Start: 2021-05-19 | End: 2021-09-21 | Stop reason: ALTCHOICE

## 2021-05-19 NOTE — TELEPHONE ENCOUNTER
I spoke with Mr. Sharp, and he already had the covid test done. He said no one had told him about the 72 hour window. He had the results faxed here. I told him I would have Angelika contact him about the time window.

## 2021-05-19 NOTE — TELEPHONE ENCOUNTER
Pt called with lab results.  Everolimus level 6.7. Goal 6-8.  No dose changes at this time.  Tacrolimus level 5.1.  Goal 3-5.  Pt to decrease dose to 3mg BID and recheck next week at clinic visit.  Pt states understanding plan of care and instructions.

## 2021-05-20 ENCOUNTER — TELEPHONE (OUTPATIENT)
Dept: TRANSPLANT | Facility: CLINIC | Age: 61
End: 2021-05-20

## 2021-05-20 DIAGNOSIS — Z94.1 STATUS POST HEART TRANSPLANTATION (H): Primary | ICD-10-CM

## 2021-05-20 NOTE — LETTER
[facility] [phone] (Phone)  [phone] (Fax)       May 20, 2021    Patient Name: Mariusz Sharp   :  1960   MRN: [id number]  ICD10:  z94.1 , z79.899    Subject: Request for Labs    Mariusz Sharp is a heart transplant patient currently being followed by the [facility].  We would like to request your assistance in obtaining the following laboratory tests which are part of our routine surveillance program for heart transplant recipients.  (Items needed are checked.)    Please fax the lab results as soon as they are available to:   Thoracic Transplant Department  Fax Number:  [phone]     Item Frequency   X CBC with platelets Once in May 2021   X Basic metabolic panel (including:  BUN,   Serum Creatinine, Sodium, Potassium, Chloride,   CO2, Calcium, Magnesium, Phosphorus, and   Glucose) Once in May 2021   X Everolimus/ Tacrolimus/Prograf level  (Should be mailed to the [facility] in the  provided to you by the patient.) Once in May 2021   X UA (urine analysis) Once in May 2021     Thank you  for your continued support and the opportunity to collaborate in the care of this patient.  If you have any questions, please call the Thoracic Transplant Team at [phone] or [phone].    .

## 2021-05-20 NOTE — TELEPHONE ENCOUNTER
Pt's upcoming appointments moved to 6/23/21 due to insurance authorization issues.  Reviewed with Dr. Ortiz- OK to move pt to 6/23/21.  Pt called and updated- and is agreeable to plan.

## 2021-05-24 ENCOUNTER — TELEPHONE (OUTPATIENT)
Dept: TRANSPLANT | Facility: CLINIC | Age: 61
End: 2021-05-24

## 2021-05-24 NOTE — LETTER
[employer]   [phone]    May 12, 2021    Patient Name:  Mariusz Sharp  :  1960  MRN:  [id number]   ICD10: z94.1, z79.899    everolimus (ZORTRESS) 0.5 MG tablet 210 tablet 11 Refills Start:  2021     Sig - Route: Take 4 tablets (2 mg) by mouth every morning AND 3 tablets (1.5 mg) every evening. - Oral   Class: No Print Out   Notes to Pharmacy: TXP DT 2020 (Heart) TXP Dischg DT 2020 DX Heart transplant Z94.1 TX Center [facility] ([place], [place])   Order: [id number]           .

## 2021-05-24 NOTE — TELEPHONE ENCOUNTER
Patient Call: Voicemail  Date/Time: 5/24/21 924am  Reason for call: Patient left a message for Angelika requesting an updated Rx for Zortress to be called in to local pharmacy (did not leave name of pharm), he will be out of medication next Monday.

## 2021-05-24 NOTE — TELEPHONE ENCOUNTER
Returned call to pt regarding Zortress rx.  Pt states [employer] needs updated prescription.  Prescription was faxed on 5/12/21.    Writer called [employer]- they do have an updated Rx for 210 tabs available for refill.  Writer called pt back letting him know he can order his refills from [employer].

## 2021-05-30 DIAGNOSIS — Z11.59 ENCOUNTER FOR SCREENING FOR OTHER VIRAL DISEASES: ICD-10-CM

## 2021-06-01 DIAGNOSIS — Z94.1 STATUS POST HEART TRANSPLANTATION (H): ICD-10-CM

## 2021-06-02 DIAGNOSIS — Z94.1 STATUS POST HEART TRANSPLANTATION (H): Primary | ICD-10-CM

## 2021-06-07 NOTE — PROGRESS NOTES
Mariusz Sharp  is being evaluated via a billable video visit.      How would you like to obtain your AVS? [employer]     Text video visit link to: [phone]    Will anyone else be joining your video visit? No    Paola JOHNSON MA    Outcome for 06/07/21 3:09 PM :Glucose data obtained via Phone and Located in Table Below     06/07/2021  AM: 225  PM: 228  PM: 261    06/06/2021  AM: 173  PM: 118    06/05/2021  AM: 361  PM: 206    06/04/2021  AM: 477    05/27/2021  AM: 335

## 2021-06-08 ENCOUNTER — TELEPHONE (OUTPATIENT)
Dept: ENDOCRINOLOGY | Facility: CLINIC | Age: 61
End: 2021-06-08

## 2021-06-08 ENCOUNTER — VIRTUAL VISIT (OUTPATIENT)
Dept: ENDOCRINOLOGY | Facility: CLINIC | Age: 61
End: 2021-06-08
Payer: COMMERCIAL

## 2021-06-08 DIAGNOSIS — E11.65 TYPE 2 DIABETES MELLITUS WITH HYPERGLYCEMIA, WITH LONG-TERM CURRENT USE OF INSULIN (H): Primary | ICD-10-CM

## 2021-06-08 DIAGNOSIS — Z79.4 TYPE 2 DIABETES MELLITUS WITH HYPERGLYCEMIA, WITH LONG-TERM CURRENT USE OF INSULIN (H): Primary | ICD-10-CM

## 2021-06-08 PROCEDURE — 99215 OFFICE O/P EST HI 40 MIN: CPT | Mod: GT | Performed by: PHYSICIAN ASSISTANT

## 2021-06-08 NOTE — PROGRESS NOTES
Due to the COVID 19 pandemic this visit was converted to a video visit in order to help prevent spread of infection in this patient and the general population.    Time of start: 10:30 am  Time of end: 10:51 am  Total duration of video visit: 21 minutes.    CHANELL Sharp ( Pepe ) is a 60 year old male with type 2 diabetes mellitus.Video visit today for diabetes care.  Pt was admitted 5/17/2020-5/29/2020 and underwent a heart transplant on 5/18/2020.  Red was also admitted 8/31/2020-9/24/2020 with fever, cough, diarrhea and syncope in setting of increase RADER and was dx having post-obstructive vs apiration pneumonia. Pt also had RUL/suprahilar mass + abiotrophia defectiva with narrowing of multiple RUL bronchil. He was tx with antibiotics with improvement.  Pt's hx is significant for type 2 diabetes mellitus dx 3 years ago, HTN, hyperlipidemia, hx of ischemic heart disease s/p STEMI with ALYSSA 2019, LVAD placement, Stage 3 CKD, and obesity.  Apparently heart donor blood cx and respiratory cultures were + for H flu, staph and strep.  Red was also admitted 5/12-5/15/2020 for ANDREW due to nephrolithiasis complicated by hydronephrosis/ANDREW.  For his diabetes, pt is currently taking Lantus 28 units subcutaneous each pm and prescribed to take Novolog 1 unit/5 gms CHO with meals with correction scale  ( 1 unit/20 for BG > 140 ) and bedtime correction scale ( 1 unit/25 for BG > 200). He just started taking Novolog again a few days ago.  He has had financial issues.  Apparently his paperwork to get insulin via the patient assistance program is still being worked on per patient.  Pt's A1C most recent A1C was high at 10.0 %. His previous A1C  was 6.2 % on 10/29/2020 and A1C was 7.4 % on 8/19/2020. He is anemic.  His insurance will no longer cover his DexcomG6 sensor supplies, so he is not using his Dexcom sensor.  I have no glucose meter download today.  Pt did provide me with blood sugar readings for the past few days which  are improving since he started taking insulin.  His blood sugar readings are in his chart.  On ROS today, main complaint is fatigue.  He continues to walk daily.  Breathing stable at this time.. No cough, fever or chills. Pt received the COVID19 vaccine ( Pfizer).  Mild tingling in both feet. He denies foot ulcers.  Pt denies headaches, blurred vision, n/v, abd pain, diarrhea, dysuria or hematuria.    Diabetes Care  Retinopathy:none; pt seen by Oph in Jan 2021.  Nephropathy:yes- hx of CKD/ANDREW.  Neuropathy: mild tingling in both feet.  Foot Exam:no exam today.  Taking aspirin: yes.  Lipids: LDL 81 on 10/29/2020. Pt is taking Crestor.  CAD: yes; s/p heart transplant on 5/18/2020.  Depression: no.  Insulin: Basal and meal time insulin with correction scale ( 1 unit/20 for BG > 140 with meals and > 200 at bedtime).  Testing: glucose meter. Pt has a DexcomG6 sensor - not using sensor since his insurance will not cover his Dexcom supplies.    ROS  See under HPI.    Allergies  No Known Allergies    Medications  Current Outpatient Medications   Medication Sig Dispense Refill     ACCU-CHEK [place] PLUS test strip Check BG 3 times daily at meals and at bedtime. 300 strip 0     acetaminophen (TYLENOL) 325 MG tablet Take 2 tablets (650 mg) by mouth every 4 hours as needed for other (multimodal surgical pain management along with NSAIDS and opioid medication as indicated based on pain control and physical function.)       amLODIPine (NORVASC) 5 MG tablet Take 1 tablet (5 mg) by mouth daily 90 tablet 3     BD SILVANA U/F 32G X 4 MM insulin pen needle Use 6 times daily. 600 each 3     calcium carbonate 600 mg-vitamin D 400 units (CALTRATE) 600-400 MG-UNIT per tablet Take 1 tablet by mouth 2 times daily (with meals)       everolimus (ZORTRESS) 0.5 MG tablet Take 4 tablets (2 mg) by mouth every morning AND 3 tablets (1.5 mg) every evening. 210 tablet 11     hydrALAZINE (APRESOLINE) 50 MG tablet Take 2 tablets (100 mg) by mouth 3 times  daily 540 tablet 3     insulin aspart (NOVOLOG PEN) 100 UNIT/ML pen Inject 1-14 units per custom scale  For Pre-Meal BG less than 140, no additional insulin needed   to 159 give 1 units.    to 179 give 2 units.    to 199 give 3 units.    to 219 give 4 units.    to 239 give 5 units.    to 259 give 6 units.    to 279 give 7 units.    to 299 give 8 units.    to 319 give 9 units.    to 339 give 10 units.    to 359 give 11 units.    to 379 give 12 units.   to 399 give 13 units.  BG >/=400 give 14 units. 3 mL 0     insulin aspart (NOVOLOG PEN) 100 UNIT/ML pen Inject 1-11 Units Subcutaneous At Bedtime Inject 1-11 units at bedtime per custom scale   For Bedtime BG less than 200, no additional insulin needed   to 219 give 1 units.    to 239 give 2 units.    to 259 give 3 units.    to 279 give 4 units.    to 299 give 5 units.    to 319 give 6 units.    to 339 give 7 units.    to 359 give 8 units    to 379 give 9 units.   to 399 give 10 units.  BG >/=400 give 11 units. 3 mL 0     insulin glargine (LANTUS PEN) 100 UNIT/ML pen Inject 28 units subcutaneous at hs. 15 mL 3     loperamide (IMODIUM) 2 MG capsule Take 1 capsule (2 mg) by mouth 4 times daily as needed for diarrhea 60 capsule 0     magnesium oxide (MAG-OX) 400 (241.3 Mg) MG tablet Take 1 tablet (400 mg) by mouth 2 times daily 90 tablet 3     multivitamin w/minerals (THERA-VIT-M) tablet Take 1 tablet by mouth daily 90 tablet 3     NOVOLOG FLEXPEN 100 UNIT/ML soln INJECT 1 TO 14 UNITS UNDER THE SKIN  WITH MEALS AND 1 TO 11 UNITS AT BEDTIME PER SLIDING SCALES, AND INJECT 1 UNIT UNDER THE SKIN PER 5 GRAMS OF CARBOHYDRATES WITH MEALS AND SNACKS 15 mL 3     pantoprazole (PROTONIX) 40 MG EC tablet Take 1 tablet (40 mg) by mouth every morning (before breakfast) 90 tablet 3     rosuvastatin (CRESTOR) 10 MG tablet TAKE ONE TABLET BY MOUTH ONCE  DAILY 90 tablet 3     tacrolimus (GENERIC EQUIVALENT) 1 MG capsule Take 3 capsules (3 mg) by mouth every morning AND 3 capsules (3 mg) every evening. 180 capsule 11     Continuous Blood Gluc Sensor (FREESTYLE DARRELL 2 SENSOR) MISC 1 Application every 14 days (Patient not taking: Reported on 6/7/2021) 8 each 4       Family History  Mother and father with hx of type 2 DM.  Social History   reports that he has never smoked. He has never used smokeless tobacco. He reports that he does not drink alcohol or use drugs.     Past Medical History  Past Medical History:   Diagnosis Date     Acute kidney injury (H)      CKD (chronic kidney disease)      Coronary artery disease      Diabetes mellitus (H)      HTN (hypertension)      Hyperlipidemia      ICD (implantable cardioverter-defibrillator), single chamber- NOT dependent 7/17/2018     Ischemic cardiomyopathy      LV (left ventricular) mural thrombus      Paroxysmal atrial flutter (H)      RVF (right ventricular failure) (H)      Systolic heart failure (H)      Ventricular tachycardia (H)        Past Surgical History:   Procedure Laterality Date     BRONCHOSCOPY (RIGID OR FLEXIBLE), DIAGNOSTIC N/A 9/15/2020    Procedure: BRONCHOSCOPY, WITH BRONCHOALVEOLAR LAVAGE;  Surgeon: Elder Mejia MD;  Location:  GI     BRONCHOSCOPY (RIGID OR FLEXIBLE), DIAGNOSTIC N/A 9/17/2020    Procedure: BRONCHOSCOPY, WITH BRONCHOALVEOLAR LAVAGE;  Surgeon: Bill Lemus MD;  Location:  OR     BRONCHOSCOPY RIGID OR FLEXIBLE W/TRANSENDOSCOPIC ENDOBRONCHIAL ULTRASOUND GUIDED Right 9/8/2020    Procedure: BRONCHOSCOPY, WITH ENDOBRONCHIAL ULTRASOUND;  Surgeon: Donny Mercedes MD;  Location:  GI     COLONOSCOPY N/A 12/7/2018    Procedure: COLONOSCOPY;  Surgeon: Krish Mercado MD;  [facility]: [facility]     CV HEART BIOPSY N/A 5/24/2020    Procedure: Heart Biopsy;  Surgeon: Kit Bacon MD;  Location:  HEART CARDIAC CATH LAB     CV HEART BIOPSY N/A 6/1/2020    Procedure: CV  HEART BIOPSY;  Surgeon: Kit Bacon MD;  [facility]     CV HEART BIOPSY N/A 6/8/2020    Procedure: CV HEART BIOPSY;  Surgeon: Kit Bacon MD;  [facility]     CV HEART BIOPSY N/A 6/15/2020    Procedure: CV HEART BIOPSY;  Surgeon: Kit Bacon MD;  [facility]     CV HEART BIOPSY N/A 6/30/2020    Procedure: CV HEART BIOPSY;  Surgeon: Kit Bacon MD;  [facility]     CV HEART BIOPSY N/A 7/14/2020    Procedure: CV HEART BIOPSY;  Surgeon: Brian Long MD;  [facility]     CV HEART BIOPSY N/A 7/29/2020    Procedure: CV HEART BIOPSY;  Surgeon: Momo Hnut MD;  [facility]     CV HEART BIOPSY N/A 8/13/2020    Procedure: CV HEART BIOPSY;  Surgeon: Khris Mcclelland MD;  [facility]     CV HEART BIOPSY N/A 8/26/2020    Procedure: CV HEART BIOPSY;  Surgeon: Momo Hunt MD;  [facility]     CV HEART BIOPSY N/A 9/30/2020    Procedure: CV HEART BIOPSY;  Surgeon: Artemio Ulloa MD;  [facility]     CV HEART BIOPSY N/A 10/29/2020    Procedure: CV HEART BIOPSY;  Surgeon: Kit Bacon MD;  [facility]     CV HEART BIOPSY N/A 1/5/2021    Procedure: CV HEART BIOPSY;  Surgeon: Carlin Garcia MD;  [facility]     CV RIGHT HEART CATH MEASUREMENTS RECORDED N/A 2/19/2019    Procedure: RHC;  Surgeon: Brian Long MD;  Location:  HEART CARDIAC CATH LAB     CV RIGHT HEART CATH MEASUREMENTS RECORDED N/A 7/5/2019    Procedure: CV RIGHT HEART CATH;  Surgeon: Khris Mcclelland MD;  [facility]     CV RIGHT HEART CATH MEASUREMENTS RECORDED N/A 5/24/2020    Procedure: Right Heart Cath;  Surgeon: Kit Bacon  MD Vero;  Location:  HEART CARDIAC CATH LAB     CV RIGHT HEART CATH MEASUREMENTS RECORDED N/A 6/1/2020    Procedure: CV RIGHT HEART CATH;  Surgeon: Kit Bacon MD;  Location:  HEART CARDIAC CATH LAB     CV RIGHT HEART CATH MEASUREMENTS RECORDED N/A 6/8/2020    Procedure: CV RIGHT HEART CATH;  Surgeon: Kit Bacon MD;  Location:  HEART CARDIAC CATH LAB     CV RIGHT HEART CATH MEASUREMENTS RECORDED N/A 6/15/2020    Procedure: CV RIGHT HEART CATH;  Surgeon: Kit Bacon MD;  Location:  HEART CARDIAC CATH LAB     CV RIGHT HEART CATH MEASUREMENTS RECORDED N/A 6/30/2020    Procedure: CV RIGHT HEART CATH;  Surgeon: Kit Bacon MD;  Location:  HEART CARDIAC CATH LAB     CV RIGHT HEART CATH MEASUREMENTS RECORDED N/A 7/14/2020    Procedure: CV RIGHT HEART CATH;  Surgeon: Brian Long MD;  Location:  HEART CARDIAC CATH LAB     CV RIGHT HEART CATH MEASUREMENTS RECORDED N/A 7/29/2020    Procedure: CV RIGHT HEART CATH;  Surgeon: Momo Hunt MD;  Location:  HEART CARDIAC CATH LAB     CV RIGHT HEART CATH MEASUREMENTS RECORDED N/A 8/13/2020    Procedure: CV RIGHT HEART CATH;  Surgeon: Khris Mcclelland MD;  Location:  HEART CARDIAC CATH LAB     CV RIGHT HEART CATH MEASUREMENTS RECORDED N/A 8/26/2020    Procedure: CV RIGHT HEART CATH;  Surgeon: Momo Hunt MD;  Location:  HEART CARDIAC CATH LAB     CV RIGHT HEART CATH MEASUREMENTS RECORDED N/A 9/30/2020    Procedure: Right Heart Cath;  Surgeon: Artemio Ulloa MD;  Location:  HEART CARDIAC CATH LAB     CV RIGHT HEART CATH MEASUREMENTS RECORDED N/A 10/29/2020    Procedure: CV RIGHT HEART CATH;  Surgeon: Kit Bacon MD;  Location:  HEART CARDIAC CATH LAB     CV RIGHT HEART CATH MEASUREMENTS RECORDED N/A 1/5/2021    Procedure: CV RIGHT HEART CATH;  Surgeon: Carlin Garcia MD;  Location:  HEART CARDIAC CATH LAB      ENDOBRONCHIAL ULTRASOUND FLEXIBLE N/A 9/17/2020    Procedure: BRONCHOSCOPY, flexible, endobronchial ultrasound with transbronchial biopsies, endobronchial biopsies;  Surgeon: Bill Lemus MD;  [facility]      PRQ [id number] CORONARY ANGIOPLASTY ADDL BRANCH      PCI and ALYSSA to ostial LAD on 6/27/18     IMPLANT AUTOMATIC IMPLANTABLE CARDIOVERTER DEFIBRILLATOR       INSERT VENTRICULAR ASSIST DEVICE LEFT (HEARTMATE II) N/A 12/31/2018    Procedure: Median Sternotomy, On Cardiopulmonary Bypass Pump, Insertion of Left Ventricular Assist Device (Heartmate III);  Surgeon: Kamaljit Baker MD;  [facility]     PICC DOUBLE LUMEN PLACEMENT Right 05/22/2020    5FR DL PICC inserted at right basilic vein, length 38cm.     TRANSPLANT HEART RECIPIENT AFTER HEART MATE N/A 5/18/2020    Procedure: REDO MEDIAN STERNOTOMY, LYSIS OF ADHESIONS, ON CARDIOPULMONARY BYPASS PUMP, HEART TRANSPLANT RECIPIENT, REMOVAL OF LEFT VENTRICULAR ASSIST DEVICE, REMOVAL OF AUTOMATED IMPLANTABLE CARDIOVERTER DEFIBRILLATOR;  Surgeon: Elder Willis MD;  [facility]       Physical Exam    No exam today.    RESULTS  Creatinine   Date Value Ref Range Status   03/03/2021 2.09 (H) 0.66 - 1.25 mg/dL Final     GFR Estimate   Date Value Ref Range Status   03/03/2021 34 (L) >60 mL/min/[1.73_m2] Final     Comment:     Non  GFR Calc  Starting 12/18/2018, serum creatinine based estimated GFR (eGFR) will be   calculated using the Chronic Kidney Disease Epidemiology Collaboration   (CKD-EPI) equation.       Hemoglobin A1C   Date Value Ref Range Status   03/03/2021 10.0 (H) 0 - 5.6 % Final     Comment:     Normal <5.7% Prediabetes 5.7-6.4%  Diabetes 6.5% or higher - adopted from ADA   consensus guidelines.       Potassium   Date Value Ref Range Status   03/03/2021 4.0 3.4 - 5.3 mmol/L Final     ALT   Date Value Ref Range Status   01/05/2021 19 0 - 70 U/L Final     AST   Date Value Ref Range Status   01/05/2021 12 0 - 45 U/L Final      TSH   Date Value Ref Range Status   03/03/2021 1.87 0.40 - 4.00 mU/L Final     T4 Free   Date Value Ref Range Status   03/03/2021 1.08 0.76 - 1.46 ng/dL Final       Cholesterol   Date Value Ref Range Status   10/29/2020 161 <200 mg/dL Final   09/01/2020 113 <200 mg/dL Final     HDL Cholesterol   Date Value Ref Range Status   10/29/2020 51 >39 mg/dL Final   09/01/2020 28 (L) >39 mg/dL Final     LDL Cholesterol Calculated   Date Value Ref Range Status   10/29/2020 81 <100 mg/dL Final     Comment:     Desirable:       <100 mg/dl   09/01/2020 57 <100 mg/dL Final     Comment:     Desirable:       <100 mg/dl     Triglycerides   Date Value Ref Range Status   10/29/2020 144 <150 mg/dL Final   09/01/2020 143 <150 mg/dL Final     A1C   10.0 %  3/3/2021  A1C   6.2    10/29/2020  A1C   7.8    6/15/2020    ASSESSMENT/PLAN:      1.  TYPE 2 DIABETES MELLITUS: Uncontrolled ype 2 diabetes mellitus.   Pt received money from his father's account and tells me he is now able to pay for Novolog. His blood sugar values are improving with the Novolog.  Paperwork has been submitted for patient assistance for RX expense- will have our staff follow up on this paperwork and check with MSW.  No change in insulin doses today.  Avoid use of Metformin and oral diabetic meds due to CKD and lower GFR.  Pt is unable to afford his DexcomG6 sensor supplies.  Medicare will now cover Freestyle Libre2 sensors- RX sent to patient's local pharmacy.  He has a glucose meter which he is to continue to use to monitor his blood sugar readings for now.  Pt was seen by Oph in Jan 2021 without retinopathy.  He reports mild tingling in his feet.  No foot ulcers at this time.  He is taking ASA daily.  Red received the COVID19 vaccines (Pfizer).    2.  CKD/ANDREW: Most recent creat was 1.93 with GFR 36 mL/min .  Avoid use of Metformin.  Hold on starting SGLT2 at this time due to lower GFR.    3.  HX OF ISCHEMIC CARDIOMYOPATHY: S/P heart transplant on 5/18/2020 and  followed closely by transplant staff here.     4.  FOLLOW UP : with me in 2 months.    Total time spent reviewing chart note and labs today = 10 minutes.  Total time for video visit today = 21 minutes.  Total time for documentation today = 15 minutes.    TOTAL TIME FOR VIDEO VISIT TODAY ( 6/8/2021 ) = 46  minutes.    Jeannette Schafer PA-C

## 2021-06-08 NOTE — TELEPHONE ENCOUNTER
----- Message from Jeannette Schafer PA-C sent at 6/8/2021 10:47 AM CDT -----  Could you please check on Novolog coverage- program assistance for Novolog and call pt today with update.  Thank you  Leticia Schafer

## 2021-06-08 NOTE — TELEPHONE ENCOUNTER
Please check on  Novolog coverage - program assistance Christina Ramirez RN on 6/8/2021 at 4:07 PM

## 2021-06-08 NOTE — LETTER
6/8/2021       RE: Mariusz Sharp  [address] [facility] [address]     Dear Colleague,    Thank you for referring your patient, Mariusz Sharp, to the [facility] ENDOCRINOLOGY [facility] [place] at [facility]. Please see a copy of my visit note below.    Mariusz Sharp  is being evaluated via a billable video visit.      How would you like to obtain your AVS? [employer] video visit link to: [phone]    Will anyone else be joining your video visit? No    Paola JOHNSON MA    Outcome for 06/07/21 3:09 PM :Glucose data obtained via Phone and Located in Table Below     06/07/2021  AM: 225  PM: 228  PM: 261    06/06/2021  AM: 173  PM: 118    06/05/2021  AM: 361  PM: 206    06/04/2021  AM: 477    05/27/2021  AM: 335    Due to the COVID 19 pandemic this visit was converted to a video visit in order to help prevent spread of infection in this patient and the general population.    Time of start: 10:30 am  Time of end: 10:51 am  Total duration of video visit: 21 minutes.    CHANELL Vee ( Red ) ISABEL Sharp is a 60 year old male with type 2 diabetes mellitus.Video visit today for diabetes care.  Pt was admitted 5/17/2020-5/29/2020 and underwent a heart transplant on 5/18/2020.  Red was also admitted 8/31/2020-9/24/2020 with fever, cough, diarrhea and syncope in setting of increase RADER and was dx having post-obstructive vs apiration pneumonia. Pt also had RUL/suprahilar mass + abiotrophia defectiva with narrowing of multiple RUL bronchil. He was tx with antibiotics with improvement.  Pt's hx is significant for type 2 diabetes mellitus dx 3 years ago, HTN, hyperlipidemia, hx of ischemic heart disease s/p STEMI with ALYSSA 2019, LVAD placement, Stage 3 CKD, and obesity.  Apparently heart donor blood cx and respiratory cultures were + for H flu, staph and strep.  Red was also admitted 5/12-5/15/2020 for ANDREW due to nephrolithiasis complicated by hydronephrosis/ANDREW.  For  his diabetes, pt is currently taking Lantus 28 units subcutaneous each pm and prescribed to take Novolog 1 unit/5 gms CHO with meals with correction scale  ( 1 unit/20 for BG > 140 ) and bedtime correction scale ( 1 unit/25 for BG > 200). He just started taking Novolog again a few days ago.  He has had financial issues.  Apparently his paperwork to get insulin via the patient assistance program is still being worked on per patient.  Pt's A1C most recent A1C was high at 10.0 %. His previous A1C  was 6.2 % on 10/29/2020 and A1C was 7.4 % on 8/19/2020. He is anemic.  His insurance will no longer cover his DexcomG6 sensor supplies, so he is not using his Dexcom sensor.  I have no glucose meter download today.  Pt did provide me with blood sugar readings for the past few days which are improving since he started taking insulin.  His blood sugar readings are in his chart.  On ROS today, main complaint is fatigue.  He continues to walk daily.  Breathing stable at this time.. No cough, fever or chills. Pt received the COVID19 vaccine ( Pfizer).  Mild tingling in both feet. He denies foot ulcers.  Pt denies headaches, blurred vision, n/v, abd pain, diarrhea, dysuria or hematuria.    Diabetes Care  Retinopathy:none; pt seen by Oph in Jan 2021.  Nephropathy:yes- hx of CKD/ANDREW.  Neuropathy: mild tingling in both feet.  Foot Exam:no exam today.  Taking aspirin: yes.  Lipids: LDL 81 on 10/29/2020. Pt is taking Crestor.  CAD: yes; s/p heart transplant on 5/18/2020.  Depression: no.  Insulin: Basal and meal time insulin with correction scale ( 1 unit/20 for BG > 140 with meals and > 200 at bedtime).  Testing: glucose meter. Pt has a DexcomG6 sensor - not using sensor since his insurance will not cover his Dexcom supplies.    ROS  See under HPI.    Allergies  No Known Allergies    Medications  Current Outpatient Medications   Medication Sig Dispense Refill     ACCU-CHEK [place]OTTE PLUS test strip Check BG 3 times daily at meals and at  bedtime. 300 strip 0     acetaminophen (TYLENOL) 325 MG tablet Take 2 tablets (650 mg) by mouth every 4 hours as needed for other (multimodal surgical pain management along with NSAIDS and opioid medication as indicated based on pain control and physical function.)       amLODIPine (NORVASC) 5 MG tablet Take 1 tablet (5 mg) by mouth daily 90 tablet 3     BD SILVANA U/F 32G X 4 MM insulin pen needle Use 6 times daily. 600 each 3     calcium carbonate 600 mg-vitamin D 400 units (CALTRATE) 600-400 MG-UNIT per tablet Take 1 tablet by mouth 2 times daily (with meals)       everolimus (ZORTRESS) 0.5 MG tablet Take 4 tablets (2 mg) by mouth every morning AND 3 tablets (1.5 mg) every evening. 210 tablet 11     hydrALAZINE (APRESOLINE) 50 MG tablet Take 2 tablets (100 mg) by mouth 3 times daily 540 tablet 3     insulin aspart (NOVOLOG PEN) 100 UNIT/ML pen Inject 1-14 units per custom scale  For Pre-Meal BG less than 140, no additional insulin needed   to 159 give 1 units.    to 179 give 2 units.    to 199 give 3 units.    to 219 give 4 units.    to 239 give 5 units.    to 259 give 6 units.    to 279 give 7 units.    to 299 give 8 units.    to 319 give 9 units.    to 339 give 10 units.    to 359 give 11 units.    to 379 give 12 units.   to 399 give 13 units.  BG >/=400 give 14 units. 3 mL 0     insulin aspart (NOVOLOG PEN) 100 UNIT/ML pen Inject 1-11 Units Subcutaneous At Bedtime Inject 1-11 units at bedtime per custom scale   For Bedtime BG less than 200, no additional insulin needed   to 219 give 1 units.    to 239 give 2 units.    to 259 give 3 units.    to 279 give 4 units.    to 299 give 5 units.    to 319 give 6 units.    to 339 give 7 units.    to 359 give 8 units    to 379 give 9 units.   to 399 give 10 units.  BG >/=400 give 11 units. 3 mL 0     insulin glargine (LANTUS PEN) 100  UNIT/ML pen Inject 28 units subcutaneous at hs. 15 mL 3     loperamide (IMODIUM) 2 MG capsule Take 1 capsule (2 mg) by mouth 4 times daily as needed for diarrhea 60 capsule 0     magnesium oxide (MAG-OX) 400 (241.3 Mg) MG tablet Take 1 tablet (400 mg) by mouth 2 times daily 90 tablet 3     multivitamin w/minerals (THERA-VIT-M) tablet Take 1 tablet by mouth daily 90 tablet 3     NOVOLOG FLEXPEN 100 UNIT/ML soln INJECT 1 TO 14 UNITS UNDER THE SKIN  WITH MEALS AND 1 TO 11 UNITS AT BEDTIME PER SLIDING SCALES, AND INJECT 1 UNIT UNDER THE SKIN PER 5 GRAMS OF CARBOHYDRATES WITH MEALS AND SNACKS 15 mL 3     pantoprazole (PROTONIX) 40 MG EC tablet Take 1 tablet (40 mg) by mouth every morning (before breakfast) 90 tablet 3     rosuvastatin (CRESTOR) 10 MG tablet TAKE ONE TABLET BY MOUTH ONCE DAILY 90 tablet 3     tacrolimus (GENERIC EQUIVALENT) 1 MG capsule Take 3 capsules (3 mg) by mouth every morning AND 3 capsules (3 mg) every evening. 180 capsule 11     Continuous Blood Gluc Sensor (FREESTYLE DARRELL 2 SENSOR) MISC 1 Application every 14 days (Patient not taking: Reported on 6/7/2021) 8 each 4       Family History  Mother and father with hx of type 2 DM.  Social History   reports that he has never smoked. He has never used smokeless tobacco. He reports that he does not drink alcohol or use drugs.     Past Medical History  Past Medical History:   Diagnosis Date     Acute kidney injury (H)      CKD (chronic kidney disease)      Coronary artery disease      Diabetes mellitus (H)      HTN (hypertension)      Hyperlipidemia      ICD (implantable cardioverter-defibrillator), single chamber- NOT dependent 7/17/2018     Ischemic cardiomyopathy      LV (left ventricular) mural thrombus      Paroxysmal atrial flutter (H)      RVF (right ventricular failure) (H)      Systolic heart failure (H)      Ventricular tachycardia (H)        Past Surgical History:   Procedure Laterality Date     BRONCHOSCOPY (RIGID OR FLEXIBLE), DIAGNOSTIC N/A  9/15/2020    Procedure: BRONCHOSCOPY, WITH BRONCHOALVEOLAR LAVAGE;  Surgeon: Elder Mejia MD;  Location:  GI     BRONCHOSCOPY (RIGID OR FLEXIBLE), DIAGNOSTIC N/A 9/17/2020    Procedure: BRONCHOSCOPY, WITH BRONCHOALVEOLAR LAVAGE;  Surgeon: Bill Lemus MD;  Location: [facility] OR     BRONCHOSCOPY RIGID OR FLEXIBLE W/TRANSENDOSCOPIC ENDOBRONCHIAL ULTRASOUND GUIDED Right 9/8/2020    Procedure: BRONCHOSCOPY, WITH ENDOBRONCHIAL ULTRASOUND;  Surgeon: Donny Mercedes MD;  Location:  GI     COLONOSCOPY N/A 12/7/2018    Procedure: COLONOSCOPY;  Surgeon: Krish Mercado MD;  Location: [facility] OR     CV HEART BIOPSY N/A 5/24/2020    Procedure: Heart Biopsy;  Surgeon: Kit Bacon MD;  Location:  HEART CARDIAC CATH LAB     CV HEART BIOPSY N/A 6/1/2020    Procedure: CV HEART BIOPSY;  Surgeon: Kit Bacon MD;  Location:  HEART CARDIAC CATH LAB     CV HEART BIOPSY N/A 6/8/2020    Procedure: CV HEART BIOPSY;  Surgeon: Kit Bacon MD;  Location:  HEART CARDIAC CATH LAB     CV HEART BIOPSY N/A 6/15/2020    Procedure: CV HEART BIOPSY;  Surgeon: Kit Bacon MD;  Location:  HEART CARDIAC CATH LAB     CV HEART BIOPSY N/A 6/30/2020    Procedure: CV HEART BIOPSY;  Surgeon: Kit Bacon MD;  Location:  HEART CARDIAC CATH LAB     CV HEART BIOPSY N/A 7/14/2020    Procedure: CV HEART BIOPSY;  Surgeon: Brian Long MD;  Location:  HEART CARDIAC CATH LAB     CV HEART BIOPSY N/A 7/29/2020    Procedure: CV HEART BIOPSY;  Surgeon: Momo Hunt MD;  Location:  HEART CARDIAC CATH LAB     CV HEART BIOPSY N/A 8/13/2020    Procedure: CV HEART BIOPSY;  Surgeon: Khris Mcclelland MD;  Location:  HEART CARDIAC CATH LAB     CV HEART BIOPSY N/A 8/26/2020    Procedure: CV HEART BIOPSY;  Surgeon: Momo Hunt MD;  Location:  HEART CARDIAC CATH LAB     CV HEART BIOPSY N/A 9/30/2020    Procedure: CV HEART BIOPSY;  Surgeon:  Aretmio Ulloa MD;  Location:  HEART CARDIAC CATH LAB     CV HEART BIOPSY N/A 10/29/2020    Procedure: CV HEART BIOPSY;  Surgeon: Kit Bacon MD;  Location:  HEART CARDIAC CATH LAB     CV HEART BIOPSY N/A 1/5/2021    Procedure: CV HEART BIOPSY;  Surgeon: Carlin Garcia MD;  Location:  HEART CARDIAC CATH LAB     CV RIGHT HEART CATH MEASUREMENTS RECORDED N/A 2/19/2019    Procedure: RHC;  Surgeon: Brian Long MD;  Location:  HEART CARDIAC CATH LAB     CV RIGHT HEART CATH MEASUREMENTS RECORDED N/A 7/5/2019    Procedure: CV RIGHT HEART CATH;  Surgeon: Khris Mcclelland MD;  Location:  HEART CARDIAC CATH LAB     CV RIGHT HEART CATH MEASUREMENTS RECORDED N/A 5/24/2020    Procedure: Right Heart Cath;  Surgeon: Kit Bacon MD;  Location:  HEART CARDIAC CATH LAB     CV RIGHT HEART CATH MEASUREMENTS RECORDED N/A 6/1/2020    Procedure: CV RIGHT HEART CATH;  Surgeon: Kit Bacon MD;  Location:  HEART CARDIAC CATH LAB     CV RIGHT HEART CATH MEASUREMENTS RECORDED N/A 6/8/2020    Procedure: CV RIGHT HEART CATH;  Surgeon: Kit Bacon MD;  Location:  HEART CARDIAC CATH LAB     CV RIGHT HEART CATH MEASUREMENTS RECORDED N/A 6/15/2020    Procedure: CV RIGHT HEART CATH;  Surgeon: Kit Bacon MD;  Location:  HEART CARDIAC CATH LAB     CV RIGHT HEART CATH MEASUREMENTS RECORDED N/A 6/30/2020    Procedure: CV RIGHT HEART CATH;  Surgeon: Kit Bacon MD;  Location:  HEART CARDIAC CATH LAB     CV RIGHT HEART CATH MEASUREMENTS RECORDED N/A 7/14/2020    Procedure: CV RIGHT HEART CATH;  Surgeon: Brian Long MD;  Location:  HEART CARDIAC CATH LAB     CV RIGHT HEART CATH MEASUREMENTS RECORDED N/A 7/29/2020    Procedure: CV RIGHT HEART CATH;  Surgeon: Momo Hunt MD;  Location:  HEART CARDIAC CATH LAB     CV RIGHT HEART CATH MEASUREMENTS RECORDED N/A 8/13/2020    Procedure: CV  RIGHT HEART CATH;  Surgeon: Khris Mcclelland MD;  Location:  HEART CARDIAC CATH LAB     CV RIGHT HEART CATH MEASUREMENTS RECORDED N/A 8/26/2020    Procedure: CV RIGHT HEART CATH;  Surgeon: Momo Hunt MD;  Location:  HEART CARDIAC CATH LAB     CV RIGHT HEART CATH MEASUREMENTS RECORDED N/A 9/30/2020    Procedure: Right Heart Cath;  Surgeon: Artemio Ulloa MD;  Location:  HEART CARDIAC CATH LAB     CV RIGHT HEART CATH MEASUREMENTS RECORDED N/A 10/29/2020    Procedure: CV RIGHT HEART CATH;  Surgeon: Kit Bacon MD;  Location:  HEART CARDIAC CATH LAB     CV RIGHT HEART CATH MEASUREMENTS RECORDED N/A 1/5/2021    Procedure: CV RIGHT HEART CATH;  Surgeon: Carlin Garcia MD;  Location:  HEART CARDIAC CATH LAB     ENDOBRONCHIAL ULTRASOUND FLEXIBLE N/A 9/17/2020    Procedure: BRONCHOSCOPY, flexible, endobronchial ultrasound with transbronchial biopsies, endobronchial biopsies;  Surgeon: Bill Lemus MD;  Location:  OR      PRQ TRLUML CORONARY ANGIOPLASTY ADDL BRANCH      PCI and ALYSSA to ostial LAD on 6/27/18     IMPLANT AUTOMATIC IMPLANTABLE CARDIOVERTER DEFIBRILLATOR       INSERT VENTRICULAR ASSIST DEVICE LEFT (HEARTMATE II) N/A 12/31/2018    Procedure: Median Sternotomy, On Cardiopulmonary Bypass Pump, Insertion of Left Ventricular Assist Device (Heartmate III);  Surgeon: Kamaljit Baker MD;  Location: [facility] OR     PICC DOUBLE LUMEN PLACEMENT Right 05/22/2020    5FR DL PICC inserted at right basilic vein, length 38cm.     TRANSPLANT HEART RECIPIENT AFTER HEART MATE N/A 5/18/2020    Procedure: REDO MEDIAN STERNOTOMY, LYSIS OF ADHESIONS, ON CARDIOPULMONARY BYPASS PUMP, HEART TRANSPLANT RECIPIENT, REMOVAL OF LEFT VENTRICULAR ASSIST DEVICE, REMOVAL OF AUTOMATED IMPLANTABLE CARDIOVERTER DEFIBRILLATOR;  Surgeon: Elder Willis MD;  Location: [facility] OR       Physical Exam    No exam today.    RESULTS  Creatinine   Date Value Ref Range Status   03/03/2021 2.09 (H) 0.66 -  1.25 mg/dL Final     GFR Estimate   Date Value Ref Range Status   03/03/2021 34 (L) >60 mL/min/[1.73_m2] Final     Comment:     Non  GFR Calc  Starting 12/18/2018, serum creatinine based estimated GFR (eGFR) will be   calculated using the Chronic Kidney Disease Epidemiology Collaboration   (CKD-EPI) equation.       Hemoglobin A1C   Date Value Ref Range Status   03/03/2021 10.0 (H) 0 - 5.6 % Final     Comment:     Normal <5.7% Prediabetes 5.7-6.4%  Diabetes 6.5% or higher - adopted from ADA   consensus guidelines.       Potassium   Date Value Ref Range Status   03/03/2021 4.0 3.4 - 5.3 mmol/L Final     ALT   Date Value Ref Range Status   01/05/2021 19 0 - 70 U/L Final     AST   Date Value Ref Range Status   01/05/2021 12 0 - 45 U/L Final     TSH   Date Value Ref Range Status   03/03/2021 1.87 0.40 - 4.00 mU/L Final     T4 Free   Date Value Ref Range Status   03/03/2021 1.08 0.76 - 1.46 ng/dL Final       Cholesterol   Date Value Ref Range Status   10/29/2020 161 <200 mg/dL Final   09/01/2020 113 <200 mg/dL Final     HDL Cholesterol   Date Value Ref Range Status   10/29/2020 51 >39 mg/dL Final   09/01/2020 28 (L) >39 mg/dL Final     LDL Cholesterol Calculated   Date Value Ref Range Status   10/29/2020 81 <100 mg/dL Final     Comment:     Desirable:       <100 mg/dl   09/01/2020 57 <100 mg/dL Final     Comment:     Desirable:       <100 mg/dl     Triglycerides   Date Value Ref Range Status   10/29/2020 144 <150 mg/dL Final   09/01/2020 143 <150 mg/dL Final     A1C   10.0 %  3/3/2021  A1C   6.2    10/29/2020  A1C   7.8    6/15/2020    ASSESSMENT/PLAN:      1.  TYPE 2 DIABETES MELLITUS: Uncontrolled ype 2 diabetes mellitus.   Pt received money from his father's account and tells me he is now able to pay for Novolog. His blood sugar values are improving with the Novolog.  Paperwork has been submitted for patient assistance for RX expense- will have our staff follow up on this paperwork and check with  MSW.  No change in insulin doses today.  Avoid use of Metformin and oral diabetic meds due to CKD and lower GFR.  Pt is unable to afford his DexcomG6 sensor supplies.  Medicare will now cover Freestyle Libre2 sensors- RX sent to patient's local pharmacy.  He has a glucose meter which he is to continue to use to monitor his blood sugar readings for now.  Pt was seen by Oph in Jan 2021 without retinopathy.  He reports mild tingling in his feet.  No foot ulcers at this time.  He is taking ASA daily.  Red received the COVID19 vaccines (Pfizer).    2.  CKD/ANDREW: Most recent creat was 1.93 with GFR 36 mL/min .  Avoid use of Metformin.  Hold on starting SGLT2 at this time due to lower GFR.    3.  HX OF ISCHEMIC CARDIOMYOPATHY: S/P heart transplant on 5/18/2020 and followed closely by transplant staff here.     4.  FOLLOW UP : with me in 2 months.    Total time spent reviewing chart note and labs today = 10 minutes.  Total time for video visit today = 21 minutes.  Total time for documentation today = 15 minutes.    TOTAL TIME FOR VIDEO VISIT TODAY ( 6/8/2021 ) = 46  minutes.    Jeannette Schafer PA-C

## 2021-06-08 NOTE — TELEPHONE ENCOUNTER
Sent to Pharm Diabetes   Pool to check on patient assistance for  Novolog. Christina Ramirez RN on 6/8/2021 at 4:10 PM

## 2021-06-09 ENCOUNTER — TELEPHONE (OUTPATIENT)
Dept: TRANSPLANT | Facility: CLINIC | Age: 61
End: 2021-06-09

## 2021-06-09 NOTE — LETTER
[facility] [phone] (Phone)  [phone] (Fax)       2021    Patient Name: Mariusz Sharp   :  1960   MRN: [id number]  ICD10:  z94.1 , z79.899    Subject: Request for Labs    Mariusz Sharp is a heart transplant patient currently being followed by the [facility].  We would like to request your assistance in obtaining the following laboratory tests which are part of our routine surveillance program for heart transplant recipients.  (Items needed are checked.)    Please fax the lab results as soon as they are available to:   Thoracic Transplant Department  Fax Number:  [phone]     Item Frequency   X CBC with platelets Weekly in 2021   X Basic metabolic panel (including:  BUN,   Serum Creatinine, Sodium, Potassium, Chloride,   CO2, Calcium, Magnesium, Phosphorus, and   Glucose) Weekly in 2021   X Everolimus/ Tacrolimus/Prograf level  (Should be [facility] the  provided to you by the patient.) Weekly in 2021     Thank you  for your continued support and the opportunity to collaborate in the care of this patient.  If you have any questions, please call the Thoracic Transplant Team at [phone] or [phone].    .

## 2021-06-09 NOTE — TELEPHONE ENCOUNTER
Everolimus level was not done on last blood draw.  Pt called and notified.  Pt states he will have lab drawn tomorrow with a 12 drug trough.  Orders sent to [facility] in [place].

## 2021-06-16 ENCOUNTER — TELEPHONE (OUTPATIENT)
Dept: TRANSPLANT | Facility: CLINIC | Age: 61
End: 2021-06-16

## 2021-06-16 NOTE — PROGRESS NOTES
Transplant Social Work Services Phone Call      Data: Red had a heart transplant in May of 2020. Prior to this, was an LVAD patient for a couple of years. Writer knows patient well. Was informed that Red was financially struggling to pay for certain medications. His transplant coordinator has assisted him with an application for Zortress and his PCP's office has assisted him with applications for Lantus and Novolog pens. However, it appears as though Red has not heard back from the pharmaceutical company on the Novolog and it has been close to 2 months. He has been approved for Zortress and Lantus.  Intervention: Reviewed patient chart and called Red to discuss his financial issues. Called [employer] to check on status of application, but was unable to obtain any information as writer did not initiate the application. Called patient back and explained his need to call [phone] to inquire into the status of his application. Writer was able to verify that at least they had an application on file for him.  Assessment: Red verbalized understanding and reports he will call this morning to check on the status of his application.  Education provided by CM: Need to call the Pharmaceutical company  Plan:  Writer will remain available to assist with this process as necessary. He reports no other financial concerns with any other medications. Already on Zortress and Lantus PAP programs.

## 2021-06-21 ENCOUNTER — TELEPHONE (OUTPATIENT)
Dept: TRANSPLANT | Facility: CLINIC | Age: 61
End: 2021-06-21

## 2021-06-21 NOTE — TELEPHONE ENCOUNTER
Pt called and writer reviewed appointment times for this Wed visit.  Pt instructed he will need to be NPO after midnight and will need a  for after his angiogram.  Pt is getting his Covid test today, and will fax to us.

## 2021-06-21 NOTE — TELEPHONE ENCOUNTER
Order faxed for Covid 19 swab to [facility] in [place].  Pt called and notified.

## 2021-06-21 NOTE — TELEPHONE ENCOUNTER
Patient called needs his Covid test order faxed to [facility] to fax number [phone].

## 2021-06-22 DIAGNOSIS — Z94.1 HEART REPLACED BY TRANSPLANT (H): Primary | ICD-10-CM

## 2021-06-22 DIAGNOSIS — Z12.5 SCREENING PSA (PROSTATE SPECIFIC ANTIGEN): ICD-10-CM

## 2021-06-22 DIAGNOSIS — E11.9 DIABETES MELLITUS, TYPE 2 (H): ICD-10-CM

## 2021-06-22 DIAGNOSIS — Z13.220 LIPID SCREENING: ICD-10-CM

## 2021-06-23 ENCOUNTER — HOSPITAL ENCOUNTER (OUTPATIENT)
Dept: CARDIOLOGY | Facility: CLINIC | Age: 61
Discharge: HOME | End: 2021-06-23
Attending: INTERNAL MEDICINE | Admitting: INTERNAL MEDICINE
Payer: COMMERCIAL

## 2021-06-23 ENCOUNTER — APPOINTMENT (OUTPATIENT)
Dept: LAB | Facility: CLINIC | Age: 61
End: 2021-06-23
Attending: INTERNAL MEDICINE
Payer: COMMERCIAL

## 2021-06-23 ENCOUNTER — OFFICE VISIT (OUTPATIENT)
Dept: CARDIOLOGY | Facility: CLINIC | Age: 61
End: 2021-06-23
Attending: INTERNAL MEDICINE
Payer: COMMERCIAL

## 2021-06-23 ENCOUNTER — APPOINTMENT (OUTPATIENT)
Dept: MEDSURG UNIT | Facility: CLINIC | Age: 61
End: 2021-06-23
Attending: INTERNAL MEDICINE
Payer: COMMERCIAL

## 2021-06-23 ENCOUNTER — HOSPITAL ENCOUNTER (OUTPATIENT)
Facility: CLINIC | Age: 61
Discharge: HOME | End: 2021-06-23
Attending: INTERNAL MEDICINE | Admitting: INTERNAL MEDICINE
Payer: COMMERCIAL

## 2021-06-23 ENCOUNTER — HOSPITAL ENCOUNTER (OUTPATIENT)
Dept: GENERAL RADIOLOGY | Facility: CLINIC | Age: 61
End: 2021-06-23
Attending: INTERNAL MEDICINE | Admitting: INTERNAL MEDICINE
Payer: COMMERCIAL

## 2021-06-23 ENCOUNTER — RESULTS ONLY (OUTPATIENT)
Dept: OTHER | Facility: CLINIC | Age: 61
End: 2021-06-23

## 2021-06-23 VITALS
WEIGHT: 187 LBS | SYSTOLIC BLOOD PRESSURE: 124 MMHG | HEIGHT: 64 IN | BODY MASS INDEX: 31.92 KG/M2 | DIASTOLIC BLOOD PRESSURE: 86 MMHG | HEART RATE: 104 BPM | OXYGEN SATURATION: 97 %

## 2021-06-23 VITALS
HEART RATE: 92 BPM | BODY MASS INDEX: 31.72 KG/M2 | DIASTOLIC BLOOD PRESSURE: 82 MMHG | HEIGHT: 64 IN | RESPIRATION RATE: 12 BRPM | OXYGEN SATURATION: 94 % | SYSTOLIC BLOOD PRESSURE: 119 MMHG | TEMPERATURE: 97.9 F | WEIGHT: 185.8 LBS

## 2021-06-23 DIAGNOSIS — Z94.1 STATUS POST HEART TRANSPLANTATION (H): ICD-10-CM

## 2021-06-23 DIAGNOSIS — Z13.220 LIPID SCREENING: ICD-10-CM

## 2021-06-23 DIAGNOSIS — Z94.1 STATUS POST HEART TRANSPLANTATION (H): Primary | ICD-10-CM

## 2021-06-23 DIAGNOSIS — Z94.1 HEART REPLACED BY TRANSPLANT (H): ICD-10-CM

## 2021-06-23 DIAGNOSIS — Z12.5 SCREENING PSA (PROSTATE SPECIFIC ANTIGEN): ICD-10-CM

## 2021-06-23 DIAGNOSIS — E11.9 DIABETES MELLITUS, TYPE 2 (H): ICD-10-CM

## 2021-06-23 LAB
ALBUMIN SERPL-MCNC: 3.6 G/DL (ref 3.4–5)
ALP SERPL-CCNC: 98 U/L (ref 40–150)
ALT SERPL W P-5'-P-CCNC: 26 U/L (ref 0–70)
ANION GAP SERPL CALCULATED.3IONS-SCNC: 7 MMOL/L (ref 3–14)
AST SERPL W P-5'-P-CCNC: 22 U/L (ref 0–45)
BILIRUB SERPL-MCNC: 0.8 MG/DL (ref 0.2–1.3)
BUN SERPL-MCNC: 35 MG/DL (ref 7–30)
CALCIUM SERPL-MCNC: 9.3 MG/DL (ref 8.5–10.1)
CHLORIDE SERPL-SCNC: 105 MMOL/L (ref 94–109)
CHOLEST SERPL-MCNC: 168 MG/DL
CK SERPL-CCNC: 249 U/L (ref 30–300)
CMV DNA SPEC NAA+PROBE-ACNC: NORMAL [IU]/ML
CMV DNA SPEC NAA+PROBE-LOG#: NORMAL {LOG_IU}/ML
CO2 SERPL-SCNC: 24 MMOL/L (ref 20–32)
CREAT SERPL-MCNC: 1.93 MG/DL (ref 0.66–1.25)
ERYTHROCYTE [DISTWIDTH] IN BLOOD BY AUTOMATED COUNT: 12.7 % (ref 10–15)
EVEROLIMUS BLD-MCNC: 5.4 UG/L (ref 3–8)
GFR SERPL CREATININE-BSD FRML MDRD: 37 ML/MIN/{1.73_M2}
GLUCOSE BLDC GLUCOMTR-MCNC: 142 MG/DL (ref 70–99)
GLUCOSE SERPL-MCNC: 188 MG/DL (ref 70–99)
HBA1C MFR BLD: 10.3 % (ref 0–5.6)
HCT VFR BLD AUTO: 34.6 % (ref 40–53)
HDLC SERPL-MCNC: 49 MG/DL
HGB BLD-MCNC: 10.1 G/DL (ref 13.3–17.7)
HGB BLD-MCNC: 10.2 G/DL (ref 13.3–17.7)
HGB BLD-MCNC: 11.1 G/DL (ref 13.3–17.7)
LDLC SERPL CALC-MCNC: 75 MG/DL
MAGNESIUM SERPL-MCNC: 1.9 MG/DL (ref 1.6–2.3)
MCH RBC QN AUTO: 26.3 PG (ref 26.5–33)
MCHC RBC AUTO-ENTMCNC: 32.1 G/DL (ref 31.5–36.5)
MCV RBC AUTO: 82 FL (ref 78–100)
NONHDLC SERPL-MCNC: 119 MG/DL
OXYHGB MFR BLD: 74 % (ref 92–100)
OXYHGB MFR BLD: 98 % (ref 92–100)
PHOSPHATE SERPL-MCNC: 3.4 MG/DL (ref 2.5–4.5)
PLATELET # BLD AUTO: 247 10E9/L (ref 150–450)
POTASSIUM SERPL-SCNC: 3.6 MMOL/L (ref 3.4–5.3)
PROT SERPL-MCNC: 7.2 G/DL (ref 6.8–8.8)
PSA SERPL-ACNC: 9.64 UG/L (ref 0–4)
RBC # BLD AUTO: 4.22 10E12/L (ref 4.4–5.9)
SODIUM SERPL-SCNC: 135 MMOL/L (ref 133–144)
SPECIMEN SOURCE: NORMAL
TACROLIMUS BLD-MCNC: 3.5 UG/L (ref 5–15)
TME LAST DOSE: ABNORMAL H
TME LAST DOSE: NORMAL H
TRIGL SERPL-MCNC: 220 MG/DL
WBC # BLD AUTO: 5.3 10E9/L (ref 4–11)

## 2021-06-23 PROCEDURE — 86832 HLA CLASS I HIGH DEFIN QUAL: CPT | Performed by: INTERNAL MEDICINE

## 2021-06-23 PROCEDURE — 250N000009 HC RX 250: Mod: JW | Performed by: INTERNAL MEDICINE

## 2021-06-23 PROCEDURE — 258N000003 HC RX IP 258 OP 636: Performed by: INTERNAL MEDICINE

## 2021-06-23 PROCEDURE — 88346 IMFLUOR 1ST 1ANTB STAIN PX: CPT | Mod: TC | Performed by: INTERNAL MEDICINE

## 2021-06-23 PROCEDURE — 99153 MOD SED SAME PHYS/QHP EA: CPT | Performed by: INTERNAL MEDICINE

## 2021-06-23 PROCEDURE — 82550 ASSAY OF CK (CPK): CPT | Performed by: INTERNAL MEDICINE

## 2021-06-23 PROCEDURE — 86833 HLA CLASS II HIGH DEFIN QUAL: CPT | Performed by: INTERNAL MEDICINE

## 2021-06-23 PROCEDURE — 84100 ASSAY OF PHOSPHORUS: CPT | Performed by: INTERNAL MEDICINE

## 2021-06-23 PROCEDURE — 88350 IMFLUOR EA ADDL 1ANTB STN PX: CPT | Mod: 26 | Performed by: PATHOLOGY

## 2021-06-23 PROCEDURE — 999N000054 HC STATISTIC EKG NON-CHARGEABLE

## 2021-06-23 PROCEDURE — 80061 LIPID PANEL: CPT | Performed by: INTERNAL MEDICINE

## 2021-06-23 PROCEDURE — 93306 TTE W/DOPPLER COMPLETE: CPT | Mod: 26 | Performed by: INTERNAL MEDICINE

## 2021-06-23 PROCEDURE — 83036 HEMOGLOBIN GLYCOSYLATED A1C: CPT | Performed by: INTERNAL MEDICINE

## 2021-06-23 PROCEDURE — 82810 BLOOD GASES O2 SAT ONLY: CPT

## 2021-06-23 PROCEDURE — 250N000013 HC RX MED GY IP 250 OP 250 PS 637: Performed by: INTERNAL MEDICINE

## 2021-06-23 PROCEDURE — 250N000009 HC RX 250: Performed by: INTERNAL MEDICINE

## 2021-06-23 PROCEDURE — 999N000208 ECHOCARDIOGRAM COMPLETE

## 2021-06-23 PROCEDURE — 71046 X-RAY EXAM CHEST 2 VIEWS: CPT | Mod: 26 | Performed by: RADIOLOGY

## 2021-06-23 PROCEDURE — 80053 COMPREHEN METABOLIC PANEL: CPT | Performed by: INTERNAL MEDICINE

## 2021-06-23 PROCEDURE — 88307 TISSUE EXAM BY PATHOLOGIST: CPT | Mod: 26 | Performed by: PATHOLOGY

## 2021-06-23 PROCEDURE — 255N000002 HC RX 255 OP 636: Performed by: INTERNAL MEDICINE

## 2021-06-23 PROCEDURE — 93505 ENDOMYOCARDIAL BIOPSY: CPT | Performed by: INTERNAL MEDICINE

## 2021-06-23 PROCEDURE — 93456 R HRT CORONARY ARTERY ANGIO: CPT | Performed by: INTERNAL MEDICINE

## 2021-06-23 PROCEDURE — 86352 CELL FUNCTION ASSAY W/STIM: CPT | Performed by: INTERNAL MEDICINE

## 2021-06-23 PROCEDURE — 83735 ASSAY OF MAGNESIUM: CPT | Performed by: INTERNAL MEDICINE

## 2021-06-23 PROCEDURE — 71046 X-RAY EXAM CHEST 2 VIEWS: CPT

## 2021-06-23 PROCEDURE — 87799 DETECT AGENT NOS DNA QUANT: CPT | Performed by: INTERNAL MEDICINE

## 2021-06-23 PROCEDURE — 99215 OFFICE O/P EST HI 40 MIN: CPT | Mod: 25 | Performed by: INTERNAL MEDICINE

## 2021-06-23 PROCEDURE — 88350 IMFLUOR EA ADDL 1ANTB STN PX: CPT | Mod: TC | Performed by: INTERNAL MEDICINE

## 2021-06-23 PROCEDURE — G0103 PSA SCREENING: HCPCS | Performed by: INTERNAL MEDICINE

## 2021-06-23 PROCEDURE — 999N000142 HC STATISTIC PROCEDURE PREP ONLY

## 2021-06-23 PROCEDURE — 272N000001 HC OR GENERAL SUPPLY STERILE: Performed by: INTERNAL MEDICINE

## 2021-06-23 PROCEDURE — 88346 IMFLUOR 1ST 1ANTB STAIN PX: CPT | Mod: 26 | Performed by: PATHOLOGY

## 2021-06-23 PROCEDURE — C1894 INTRO/SHEATH, NON-LASER: HCPCS | Performed by: INTERNAL MEDICINE

## 2021-06-23 PROCEDURE — 99152 MOD SED SAME PHYS/QHP 5/>YRS: CPT | Performed by: INTERNAL MEDICINE

## 2021-06-23 PROCEDURE — G0463 HOSPITAL OUTPT CLINIC VISIT: HCPCS

## 2021-06-23 PROCEDURE — 36415 COLL VENOUS BLD VENIPUNCTURE: CPT | Performed by: INTERNAL MEDICINE

## 2021-06-23 PROCEDURE — 85027 COMPLETE CBC AUTOMATED: CPT | Performed by: INTERNAL MEDICINE

## 2021-06-23 PROCEDURE — 82962 GLUCOSE BLOOD TEST: CPT

## 2021-06-23 PROCEDURE — 85018 HEMOGLOBIN: CPT

## 2021-06-23 PROCEDURE — 93010 ELECTROCARDIOGRAM REPORT: CPT | Performed by: INTERNAL MEDICINE

## 2021-06-23 PROCEDURE — 250N000011 HC RX IP 250 OP 636: Performed by: INTERNAL MEDICINE

## 2021-06-23 PROCEDURE — 80169 DRUG ASSAY EVEROLIMUS: CPT | Performed by: INTERNAL MEDICINE

## 2021-06-23 PROCEDURE — 80197 ASSAY OF TACROLIMUS: CPT | Performed by: INTERNAL MEDICINE

## 2021-06-23 PROCEDURE — 88307 TISSUE EXAM BY PATHOLOGIST: CPT | Mod: TC | Performed by: INTERNAL MEDICINE

## 2021-06-23 RX ORDER — NALOXONE HYDROCHLORIDE 0.4 MG/ML
0.4 INJECTION, SOLUTION INTRAMUSCULAR; INTRAVENOUS; SUBCUTANEOUS
Status: DISCONTINUED | OUTPATIENT
Start: 2021-06-23 | End: 2021-06-23 | Stop reason: HOSPADM

## 2021-06-23 RX ORDER — HEPARIN SODIUM 10000 [USP'U]/100ML
100-1000 INJECTION, SOLUTION INTRAVENOUS CONTINUOUS PRN
Status: DISCONTINUED | OUTPATIENT
Start: 2021-06-23 | End: 2021-06-23 | Stop reason: HOSPADM

## 2021-06-23 RX ORDER — NALOXONE HYDROCHLORIDE 0.4 MG/ML
0.2 INJECTION, SOLUTION INTRAMUSCULAR; INTRAVENOUS; SUBCUTANEOUS
Status: DISCONTINUED | OUTPATIENT
Start: 2021-06-23 | End: 2021-06-23

## 2021-06-23 RX ORDER — DOPAMINE HYDROCHLORIDE 160 MG/100ML
2-20 INJECTION, SOLUTION INTRAVENOUS CONTINUOUS PRN
Status: DISCONTINUED | OUTPATIENT
Start: 2021-06-23 | End: 2021-06-23 | Stop reason: HOSPADM

## 2021-06-23 RX ORDER — ATROPINE SULFATE 0.1 MG/ML
0.5 INJECTION INTRAVENOUS
Status: DISCONTINUED | OUTPATIENT
Start: 2021-06-23 | End: 2021-06-23 | Stop reason: HOSPADM

## 2021-06-23 RX ORDER — ARGATROBAN 1 MG/ML
350 INJECTION, SOLUTION INTRAVENOUS
Status: DISCONTINUED | OUTPATIENT
Start: 2021-06-23 | End: 2021-06-23 | Stop reason: HOSPADM

## 2021-06-23 RX ORDER — DOBUTAMINE HYDROCHLORIDE 200 MG/100ML
2-20 INJECTION INTRAVENOUS CONTINUOUS PRN
Status: DISCONTINUED | OUTPATIENT
Start: 2021-06-23 | End: 2021-06-23 | Stop reason: HOSPADM

## 2021-06-23 RX ORDER — NALOXONE HYDROCHLORIDE 0.4 MG/ML
0.2 INJECTION, SOLUTION INTRAMUSCULAR; INTRAVENOUS; SUBCUTANEOUS
Status: DISCONTINUED | OUTPATIENT
Start: 2021-06-23 | End: 2021-06-23 | Stop reason: HOSPADM

## 2021-06-23 RX ORDER — FENTANYL CITRATE 50 UG/ML
25-50 INJECTION, SOLUTION INTRAMUSCULAR; INTRAVENOUS
Status: ACTIVE | OUTPATIENT
Start: 2021-06-23 | End: 2021-06-23

## 2021-06-23 RX ORDER — POTASSIUM CHLORIDE 750 MG/1
40 TABLET, EXTENDED RELEASE ORAL
Status: DISCONTINUED | OUTPATIENT
Start: 2021-06-23 | End: 2021-06-23 | Stop reason: HOSPADM

## 2021-06-23 RX ORDER — NALOXONE HYDROCHLORIDE 0.4 MG/ML
0.4 INJECTION, SOLUTION INTRAMUSCULAR; INTRAVENOUS; SUBCUTANEOUS
Status: DISCONTINUED | OUTPATIENT
Start: 2021-06-23 | End: 2021-06-23

## 2021-06-23 RX ORDER — TIROFIBAN HYDROCHLORIDE 50 UG/ML
0.07 INJECTION INTRAVENOUS CONTINUOUS PRN
Status: DISCONTINUED | OUTPATIENT
Start: 2021-06-23 | End: 2021-06-23 | Stop reason: HOSPADM

## 2021-06-23 RX ORDER — NITROGLYCERIN 20 MG/100ML
10-200 INJECTION INTRAVENOUS CONTINUOUS PRN
Status: DISCONTINUED | OUTPATIENT
Start: 2021-06-23 | End: 2021-06-23 | Stop reason: HOSPADM

## 2021-06-23 RX ORDER — TORSEMIDE 20 MG/1
20 TABLET ORAL DAILY
Qty: 90 TABLET | Refills: 3 | Status: SHIPPED | OUTPATIENT
Start: 2021-06-23

## 2021-06-23 RX ORDER — EPTIFIBATIDE 2 MG/ML
180 INJECTION, SOLUTION INTRAVENOUS EVERY 10 MIN PRN
Status: DISCONTINUED | OUTPATIENT
Start: 2021-06-23 | End: 2021-06-23 | Stop reason: HOSPADM

## 2021-06-23 RX ORDER — EPTIFIBATIDE 2 MG/ML
2 INJECTION, SOLUTION INTRAVENOUS CONTINUOUS PRN
Status: DISCONTINUED | OUTPATIENT
Start: 2021-06-23 | End: 2021-06-23 | Stop reason: HOSPADM

## 2021-06-23 RX ORDER — ACETAMINOPHEN 325 MG/1
650 TABLET ORAL EVERY 4 HOURS PRN
Status: DISCONTINUED | OUTPATIENT
Start: 2021-06-23 | End: 2021-06-23 | Stop reason: HOSPADM

## 2021-06-23 RX ORDER — LIDOCAINE 40 MG/G
CREAM TOPICAL
Status: DISCONTINUED | OUTPATIENT
Start: 2021-06-23 | End: 2021-06-23 | Stop reason: HOSPADM

## 2021-06-23 RX ORDER — ONDANSETRON 2 MG/ML
4 INJECTION INTRAMUSCULAR; INTRAVENOUS EVERY 6 HOURS PRN
Status: DISCONTINUED | OUTPATIENT
Start: 2021-06-23 | End: 2021-06-23 | Stop reason: HOSPADM

## 2021-06-23 RX ORDER — SODIUM CHLORIDE 9 MG/ML
INJECTION, SOLUTION INTRAVENOUS CONTINUOUS
Status: DISCONTINUED | OUTPATIENT
Start: 2021-06-23 | End: 2021-06-23 | Stop reason: HOSPADM

## 2021-06-23 RX ORDER — FENTANYL CITRATE 50 UG/ML
INJECTION, SOLUTION INTRAMUSCULAR; INTRAVENOUS
Status: DISCONTINUED | OUTPATIENT
Start: 2021-06-23 | End: 2021-06-23 | Stop reason: HOSPADM

## 2021-06-23 RX ORDER — POTASSIUM CHLORIDE 750 MG/1
20 TABLET, EXTENDED RELEASE ORAL
Status: COMPLETED | OUTPATIENT
Start: 2021-06-23 | End: 2021-06-23

## 2021-06-23 RX ORDER — EPTIFIBATIDE 2 MG/ML
1 INJECTION, SOLUTION INTRAVENOUS CONTINUOUS PRN
Status: DISCONTINUED | OUTPATIENT
Start: 2021-06-23 | End: 2021-06-23 | Stop reason: HOSPADM

## 2021-06-23 RX ORDER — ARGATROBAN 1 MG/ML
150 INJECTION, SOLUTION INTRAVENOUS
Status: DISCONTINUED | OUTPATIENT
Start: 2021-06-23 | End: 2021-06-23 | Stop reason: HOSPADM

## 2021-06-23 RX ORDER — DIPHENHYDRAMINE HYDROCHLORIDE 50 MG/ML
INJECTION INTRAMUSCULAR; INTRAVENOUS
Status: DISCONTINUED | OUTPATIENT
Start: 2021-06-23 | End: 2021-06-23 | Stop reason: HOSPADM

## 2021-06-23 RX ORDER — FLUMAZENIL 0.1 MG/ML
0.2 INJECTION, SOLUTION INTRAVENOUS
Status: DISCONTINUED | OUTPATIENT
Start: 2021-06-23 | End: 2021-06-23 | Stop reason: HOSPADM

## 2021-06-23 RX ADMIN — ASPIRIN 325 MG: 325 TABLET, COATED ORAL at 08:55

## 2021-06-23 RX ADMIN — HUMAN ALBUMIN MICROSPHERES AND PERFLUTREN 5 ML: 10; .22 INJECTION, SOLUTION INTRAVENOUS at 08:06

## 2021-06-23 RX ADMIN — SODIUM CHLORIDE: 9 INJECTION, SOLUTION INTRAVENOUS at 09:06

## 2021-06-23 RX ADMIN — LIDOCAINE: 40 CREAM TOPICAL at 09:18

## 2021-06-23 RX ADMIN — POTASSIUM CHLORIDE 20 MEQ: 750 TABLET, EXTENDED RELEASE ORAL at 08:55

## 2021-06-23 ASSESSMENT — MIFFLIN-ST. JEOR
SCORE: 1572.61
SCORE: 1574.03

## 2021-06-23 ASSESSMENT — PAIN SCALES - GENERAL: PAINLEVEL: NO PAIN (0)

## 2021-06-23 NOTE — PATIENT INSTRUCTIONS
Patient Instructions  1. Start taking Torsemide 20mg once a day.  2. Keep checking your blood pressure at home.  If it starts to stay in the 140 range systolic- please call Angelika.  3. Check you Tacrolimus and Everolimus levels and creatinine checked in one month.      Next transplant clinic appointment:  in September for 16 month visit with echo/labs/MD visit.  Next lab draw:   Coordinator will call with all pending results.     Please call transplant coordinator with any questions:    Angelika Muller RN BSN   Post Heart Transplant Nurse Coordinator  [facility]  Questions: [phone]

## 2021-06-23 NOTE — H&P
History and Physical: Cardiology Cath Lab Service    Mariusz Sharp MRN# [id number]   YOB: 1960 Age: 60 year old         Assessment and plan:   Red Sharp is a pleasant 60 y.o.M with a PMHx CAD s/p ALYSSA to LAD (6/2018), ICM (LVEF 20-25%) s/p HM3 LVAD (12/31/18), monomorphic VT (s/p ICD with shocks), LV thrombus (no longer on anticoagulation), CKD 3, elevated PSA, pAF, HTN, HL, and DMII, now s/p OHT 5/18/20 who is seen today for planned RHC/Hbx/cors as part of OHT surveillance.     # s/p OHT  - No contraindications noted, will move forward with RHC/Hbx/Cors  - Patient will be admitted as OP to unit 3C post procedure, with plans to discharge home with brother after post procedure orders have been met    Patient seen and discussed with Dr. Jose MORALES, [facility] Cardiology Cath Lab Services  [phone]      HPI:   Red Sharp is a pleasant 60 y.o.M with a PMHx CAD s/p ALYSSA to LAD (6/2018), ICM (LVEF 20-25%) s/p HM3 LVAD (12/31/18), monomorphic VT (s/p ICD with shocks), LV thrombus (no longer on anticoagulation), CKD 3, elevated PSA, pAF, HTN, HL, and DMII, now s/p OHT 5/18/20 who is seen today for planned RHC/Hbx/cors as part of OHT surveillance.     Patient reports feeling well today, he states outside of bacterial infection, malnutrition (requiring tube feeds) last fall, he has been doing well at home. He denies recent illness, chest pain, shortness of breath, abdominal pain, headache, vision change, or weakness. No major changes in health history since previous visit.     Past Medical History:   Diagnosis Date     Acute kidney injury (H)      CKD (chronic kidney disease)      Coronary artery disease      Diabetes mellitus (H)      HTN (hypertension)      Hyperlipidemia      ICD (implantable cardioverter-defibrillator), single chamber- NOT dependent 7/17/2018     Ischemic cardiomyopathy      LV (left ventricular) mural thrombus      Paroxysmal atrial flutter (H)      RVF (right  ventricular failure) (H)      Systolic heart failure (H)      Ventricular tachycardia (H)        Past Surgical History:   Procedure Laterality Date     BRONCHOSCOPY (RIGID OR FLEXIBLE), DIAGNOSTIC N/A 9/15/2020    Procedure: BRONCHOSCOPY, WITH BRONCHOALVEOLAR LAVAGE;  Surgeon: Elder Mejia MD;  Location:  GI     BRONCHOSCOPY (RIGID OR FLEXIBLE), DIAGNOSTIC N/A 9/17/2020    Procedure: BRONCHOSCOPY, WITH BRONCHOALVEOLAR LAVAGE;  Surgeon: Bill Lemus MD;  Location: [facility] OR     BRONCHOSCOPY RIGID OR FLEXIBLE W/TRANSENDOSCOPIC ENDOBRONCHIAL ULTRASOUND GUIDED Right 9/8/2020    Procedure: BRONCHOSCOPY, WITH ENDOBRONCHIAL ULTRASOUND;  Surgeon: Donny Mercedes MD;  Location:  GI     COLONOSCOPY N/A 12/7/2018    Procedure: COLONOSCOPY;  Surgeon: Krish Mercado MD;  Location: [facility] OR     CV HEART BIOPSY N/A 5/24/2020    Procedure: Heart Biopsy;  Surgeon: Kit Bacon MD;  Location:  HEART CARDIAC CATH LAB     CV HEART BIOPSY N/A 6/1/2020    Procedure: CV HEART BIOPSY;  Surgeon: Kit Bacon MD;  Location:  HEART CARDIAC CATH LAB     CV HEART BIOPSY N/A 6/8/2020    Procedure: CV HEART BIOPSY;  Surgeon: Kit Bacon MD;  Location:  HEART CARDIAC CATH LAB     CV HEART BIOPSY N/A 6/15/2020    Procedure: CV HEART BIOPSY;  Surgeon: Kit Bacon MD;  Location:  HEART CARDIAC CATH LAB     CV HEART BIOPSY N/A 6/30/2020    Procedure: CV HEART BIOPSY;  Surgeon: Kit Bacon MD;  Location:  HEART CARDIAC CATH LAB     CV HEART BIOPSY N/A 7/14/2020    Procedure: CV HEART BIOPSY;  Surgeon: Brian Long MD;  Location:  HEART CARDIAC CATH LAB     CV HEART BIOPSY N/A 7/29/2020    Procedure: CV HEART BIOPSY;  Surgeon: Momo Hunt MD;  Location:  HEART CARDIAC CATH LAB     CV HEART BIOPSY N/A 8/13/2020    Procedure: CV HEART BIOPSY;  Surgeon: Khris Mcclelland MD;  Location:  HEART CARDIAC CATH LAB     CV  HEART BIOPSY N/A 8/26/2020    Procedure: CV HEART BIOPSY;  Surgeon: Momo Hunt MD;  Location: [facility]ARDIAC C[facility] LAB     CV HEART BIOPSY N/A 9/30/2020    Procedure: CV HEART BIOPSY;  Surgeon: Artemio Ulloa MD;  Location:  HEART CARDIAC CATH LAB     CV HEART BIOPSY N/A 10/29/2020    Procedure: CV HEART BIOPSY;  Surgeon: Kit Bacon MD;  Location:  HEART CARDIAC CATH LAB     CV HEART BIOPSY N/A 1/5/2021    Procedure: CV HEART BIOPSY;  Surgeon: Carlin Garcia MD;  Location:  HEART CARDIAC CATH LAB     CV RIGHT HEART CATH MEASUREMENTS RECORDED N/A 2/19/2019    Procedure: RHC;  Surgeon: Brian Long MD;  Location:  HEART CARDIAC CATH LAB     CV RIGHT HEART CATH MEASUREMENTS RECORDED N/A 7/5/2019    Procedure: CV RIGHT HEART CATH;  Surgeon: Khris Mcclelland MD;  Location:  HEART CARDIAC CATH LAB     CV RIGHT HEART CATH MEASUREMENTS RECORDED N/A 5/24/2020    Procedure: Right Heart Cath;  Surgeon: Kit Bacon MD;  Location:  HEART CARDIAC CATH LAB     CV RIGHT HEART CATH MEASUREMENTS RECORDED N/A 6/1/2020    Procedure: CV RIGHT HEART CATH;  Surgeon: Kit Bacon MD;  Location:  HEART CARDIAC CATH LAB     CV RIGHT HEART CATH MEASUREMENTS RECORDED N/A 6/8/2020    Procedure: CV RIGHT HEART CATH;  Surgeon: Kit Bacon MD;  Location:  HEART CARDIAC CATH LAB     CV RIGHT HEART CATH MEASUREMENTS RECORDED N/A 6/15/2020    Procedure: CV RIGHT HEART CATH;  Surgeon: Kit Bacon MD;  Location:  HEART CARDIAC CATH LAB     CV RIGHT HEART CATH MEASUREMENTS RECORDED N/A 6/30/2020    Procedure: CV RIGHT HEART CATH;  Surgeon: Kit Bacon MD;  Location:  HEART CARDIAC CATH LAB     CV RIGHT HEART CATH MEASUREMENTS RECORDED N/A 7/14/2020    Procedure: CV RIGHT HEART CATH;  Surgeon: Brian Long MD;  [facility]     CV RIGHT HEART CATH MEASUREMENTS  RECORDED N/A 7/29/2020    Procedure: CV RIGHT HEART CATH;  Surgeon: Momo Hunt MD;  Location:  HEART CARDIAC CATH LAB     CV RIGHT HEART CATH MEASUREMENTS RECORDED N/A 8/13/2020    Procedure: CV RIGHT HEART CATH;  Surgeon: Khris Mcclelland MD;  Location:  HEART CARDIAC CATH LAB     CV RIGHT HEART CATH MEASUREMENTS RECORDED N/A 8/26/2020    Procedure: CV RIGHT HEART CATH;  Surgeon: oMmo Hunt MD;  Location:  HEART CARDIAC CATH LAB     CV RIGHT HEART CATH MEASUREMENTS RECORDED N/A 9/30/2020    Procedure: Right Heart Cath;  Surgeon: Artemio Ulloa MD;  Location:  HEART CARDIAC CATH LAB     CV RIGHT HEART CATH MEASUREMENTS RECORDED N/A 10/29/2020    Procedure: CV RIGHT HEART CATH;  Surgeon: Kit Baocn MD;  Location:  HEART CARDIAC CATH LAB     CV RIGHT HEART CATH MEASUREMENTS RECORDED N/A 1/5/2021    Procedure: CV RIGHT HEART CATH;  Surgeon: Carlin Garcia MD;  Location:  HEART CARDIAC CATH LAB     ENDOBRONCHIAL ULTRASOUND FLEXIBLE N/A 9/17/2020    Procedure: BRONCHOSCOPY, flexible, endobronchial ultrasound with transbronchial biopsies, endobronchial biopsies;  Surgeon: Bill Lemus MD;  Location:  OR      PRQ [id number] CORONARY ANGIOPLASTY ADDL BRANCH      PCI and ALYSSA to ostial LAD on 6/27/18     IMPLANT AUTOMATIC IMPLANTABLE CARDIOVERTER DEFIBRILLATOR       INSERT VENTRICULAR ASSIST DEVICE LEFT (HEARTMATE II) N/A 12/31/2018    Procedure: Median Sternotomy, On Cardiopulmonary Bypass Pump, Insertion of Left Ventricular Assist Device (Heartmate III);  Surgeon: Kamaljit Baker MD;  [facility]     PICC DOUBLE LUMEN PLACEMENT Right 05/22/2020    5FR DL PICC inserted at right basilic vein, length 38cm.     TRANSPLANT HEART RECIPIENT AFTER HEART MATE N/A 5/18/2020    Procedure: REDO MEDIAN STERNOTOMY, LYSIS OF ADHESIONS, ON CARDIOPULMONARY BYPASS PUMP, HEART TRANSPLANT RECIPIENT, REMOVAL OF LEFT VENTRICULAR ASSIST DEVICE, REMOVAL OF AUTOMATED IMPLANTABLE  CARDIOVERTER DEFIBRILLATOR;  Surgeon: Elder Willis MD;  [facility]       No current facility-administered medications on file prior to encounter.   amLODIPine (NORVASC) 5 MG tablet, Take 1 tablet (5 mg) by mouth daily  calcium carbonate 600 mg-vitamin D 400 units (CALTRATE) 600-400 MG-UNIT per tablet, Take 1 tablet by mouth 2 times daily (with meals)  hydrALAZINE (APRESOLINE) 50 MG tablet, Take 2 tablets (100 mg) by mouth 3 times daily  insulin aspart (NOVOLOG PEN) 100 UNIT/ML pen, Inject 1-14 units per custom scale  For Pre-Meal BG less than 140, no additional insulin needed   to 159 give 1 units.    to 179 give 2 units.    to 199 give 3 units.    to 219 give 4 units.    to 239 give 5 units.    to 259 give 6 units.    to 279 give 7 units.    to 299 give 8 units.    to 319 give 9 units.    to 339 give 10 units.    to 359 give 11 units.    to 379 give 12 units.   to 399 give 13 units.  BG >/=400 give 14 units.  insulin aspart (NOVOLOG PEN) 100 UNIT/ML pen, Inject 1-11 Units Subcutaneous At Bedtime Inject 1-11 units at bedtime per custom scale   For Bedtime BG less than 200, no additional insulin needed   to 219 give 1 units.    to 239 give 2 units.    to 259 give 3 units.    to 279 give 4 units.    to 299 give 5 units.    to 319 give 6 units.    to 339 give 7 units.    to 359 give 8 units    to 379 give 9 units.   to 399 give 10 units.  BG >/=400 give 11 units.  insulin glargine (LANTUS PEN) 100 UNIT/ML pen, Inject 28 units subcutaneous at hs.  magnesium oxide (MAG-OX) 400 (241.3 Mg) MG tablet, Take 1 tablet (400 mg) by mouth 2 times daily  multivitamin w/minerals (THERA-VIT-M) tablet, Take 1 tablet by mouth daily  pantoprazole (PROTONIX) 40 MG EC tablet, Take 1 tablet (40 mg) by mouth every morning (before breakfast)  rosuvastatin (CRESTOR) 10 MG tablet, TAKE ONE  "TABLET BY MOUTH ONCE DAILY  ACCU-CHEK [place] PLUS test strip, Check BG 3 times daily at meals and at bedtime.  acetaminophen (TYLENOL) 325 MG tablet, Take 2 tablets (650 mg) by mouth every 4 hours as needed for other (multimodal surgical pain management along with NSAIDS and opioid medication as indicated based on pain control and physical function.)  BD SILVANA U/F 32G X 4 MM insulin pen needle, Use 6 times daily.  Continuous Blood Gluc Sensor (FREESTYLE DARRELL 2 SENSOR) MISC, 1 Application every 14 days (Patient not taking: Reported on 6/7/2021)  loperamide (IMODIUM) 2 MG capsule, Take 1 capsule (2 mg) by mouth 4 times daily as needed for diarrhea  NOVOLOG FLEXPEN 100 UNIT/ML soln, INJECT 1 TO 14 UNITS UNDER THE SKIN  WITH MEALS AND 1 TO 11 UNITS AT BEDTIME PER SLIDING SCALES, AND INJECT 1 UNIT UNDER THE SKIN PER 5 GRAMS OF CARBOHYDRATES WITH MEALS AND SNACKS        History reviewed. No pertinent family history.    Social History     Tobacco Use     Smoking status: Never Smoker     Smokeless tobacco: Never Used   Substance Use Topics     Alcohol use: No     Frequency: Never       No Known Allergies      ROS:   Skin: negative  Respiratory: No shortness of breath, dyspnea on exertion, cough, or hemoptysis  Cardiovascular: negative for, chest pain, exertional chest pain or pressure, paroxysmal nocturnal dyspnea, dyspnea on exertion and orthopnea  Gastrointestinal: negative  Genitourinary: negative  Musculoskeletal: negative  Neurologic: negative  Hematologic/Lymphatic/Immunologic: negative  Endocrine: negative    Physical Examination:  Vitals: BP (!) 132/94 (BP Location: Right arm)   Pulse 102   Temp 98.3  F (36.8  C) (Oral)   Resp 18   Ht 1.626 m (5' 4.02\")   Wt 84.3 kg (185 lb 12.8 oz)   SpO2 100%   BMI 31.88 kg/m    BMI= Body mass index is 31.88 kg/m .    GENERAL APPEARANCE: healthy, alert and no distress  HEENT: no icterus, no xanthelasmas, normal pupil size and reaction, normal palate, mucosa moist  NECK: no " JVD, brisk carotid upstroke bilaterally  CHEST: lungs clear to auscultation without rales, rhonchi or wheezes, no use of accessory muscles, no retractions  CARDIOVASCULAR: regular rhythm, normal S1 and S2, no S3 or S4 and no murmur, click or rub.  ABDOMEN: soft, non tender, without hepatosplenomegaly, no masses palpable, bowel sounds normal  EXTREMITIES: warm, no edema, DP/PT pulses 2+ bilaterally, no clubbing or cyanosis   NEURO: alert and oriented to person/place/time, normal speech, gait and affect  SKIN: no ecchymoses, no rashes      Laboratory:  CMP  Recent Labs   Lab 06/23/21  0654      POTASSIUM 3.6   CHLORIDE 105   CO2 24   ANIONGAP 7   *   BUN 35*   CR 1.93*   GFRESTIMATED 37*   GFRESTBLACK 43*   ARLENE 9.3   MAG 1.9   PHOS 3.4   PROTTOTAL 7.2   ALBUMIN 3.6   BILITOTAL 0.8   ALKPHOS 98   AST 22   ALT 26     CBC  Recent Labs   Lab 06/23/21  0654   WBC 5.3   RBC 4.22*   HGB 11.1*   HCT 34.6*   MCV 82   MCH 26.3*   MCHC 32.1   RDW 12.7          Lab Results   Component Value Date    TROPI <0.015 09/17/2020    TROPI <0.015 09/16/2020    TROPI <0.015 09/07/2020         EKG 6/23/2021: ST, , incomplete RBBB (seen prior)      TTE 3/3/2021:  Interpretation Summary  Global and regional left ventricular function is normal with an EF of 60-65%.  Right ventricular function, chamber size, wall motion, and thickness are  normal.  Pulmonary artery systolic pressure cannot be assessed.  The inferior vena cava cannot be assessed.  No significant changes noted    [facility] 1/5/2021:  Conclusion    Successful endomyocardial biopsy. Results pending.    Right sided filling pressures are normal.    Normal PA pressures.    Left sided filling pressures are normal.    Normal cardiac output level.

## 2021-06-23 NOTE — PRE-PROCEDURE
GENERAL PRE-PROCEDURE:   Procedure:  Coronary angiogram with possible PCI, Right heart catheterization, heart biopsy  Date/Time:  6/23/2021 9:28 AM    Verbal consent obtained?: Yes    Written consent obtained?: Yes    Risks and benefits: Risks, benefits and alternatives were discussed    DC Plan: Appropriate discharge home plan in place for patients who are going home after procedure   Consent given by:  Patient  Patient states understanding of procedure being performed: Yes    Patient's understanding of procedure matches consent: Yes    Procedure consent matches procedure scheduled: Yes    Expected level of sedation:  Moderate  Appropriately NPO:  Yes  ASA Class:  Class 2- mild systemic disease, no acute problems, no functional limitations  Mallampati  :  Grade 3- soft palate visible, posterior pharyngeal wall not visible  Lungs:  Lungs clear with good breath sounds bilaterally  Heart:  Normal heart sounds and rate  History & Physical reviewed:  History and physical reviewed and no updates needed  Statement of review:  I have reviewed the lab findings, diagnostic data, medications, and the plan for sedation    CARMEN Valdez, CNP  [facility]

## 2021-06-23 NOTE — PROGRESS NOTES
D/I/A: Manual pressure held for 20 minutes .  Sheath, size 4F arterial and 7F venous,  pulled by Caitlyn PORTER.  Site CDI, no hematoma.  Stasis achieved at 1200. Off bedrest @ 1400.  A+O x4 and making needs known.  Denies pain or sob.  VSSA.  Tolerated sheath removal well.  P: Continue to monitor status.  Discharge to home once meeting criteria.

## 2021-06-23 NOTE — PROGRESS NOTES
D/I/A: Manual pressure held for 15 minutes to right groin.  Sheath, size 4F RFA, 7F RFV,  pulled by Caitlyn PORTER.  Site CDI, no hematoma.  Stasis achieved at 1200. Off bedrest @ 1400.  A+O x4 and making needs known.  Denies pain or sob.  VSSA.  Tolerated sheath removal well.  P: Continue to monitor status.  Discharge to home once meeting criteria.

## 2021-06-23 NOTE — LETTER
"6/23/2021      RE: Mariusz Sharp  [address] [facility] [address]       Dear Colleague,    Thank you for the opportunity to participate in the care of your patient, Mariusz Sharp, at the [facility] [place] at [facility]. Please see a copy of my visit note below.    ADULT HEART TRANSPLANT CLINIC      HPI:    \"Anju Sharp is a 60yr old male with a history of CAD (STEMI with ALYSSA to LAD 6/27/18), ICM (LVEF 20-25%) s/p HM3 LVAD (12/31/18), monomorphic VT (s/p ICD with shocks), LV thrombus, CKD, elevated PSA, pAF, HTN, HL, and DMII, now s/p OHT 5/18/20, who presents to clinic for ongoing evaluation and management.    His post-transplant course was c/b NSVT (with no hemodynamic compromise), leukocytosis with C Acnes growing from his former LVAD site (thought to be a contaminant, but treated with IV vanco --> po doxy through 6/1/20, 14d course total), and in the setting of donor blood growing S anginosus and Veillonella (also thought to be a contaminant, but treated with Vanco/zosyn --> Vanco/Flagyl for a total of 7 days).  He also had hyperkalemia, which improved following kayexalate and stopping bactrim.  He ultimately discharged on 5/29/20.    He was readmitted 8/31-9/24 with fever, cough, diarrhea, and syncope.  He was found to have a post-obstructive vs aspiration pneumonia, RUL/suprahilar mass, and narrowing of multiple RUL bronchi.  He was initially treated as a fungal infection as his fungitell was +ve, but he did not clinically improve, nor did the size of the mass change.  Ultimately, tissue culture from the RUL mass showed abiotrophia defectiva (oral microbe), which was thought to be a contaminant, but did respond to antibiotics.  His MMF was decreased to 500mg twice daily, as ImmuKnow was noted to be low.    His biopsies have remained negative for rejection.  He has no DSAs.     Pt reports that overall he is feeling well.  He denies any " chest pain or pressure, sob/faustin, orthopnea, pnd, palpitations, syncope/presyncope, nausea, vomiting, diarrhea, headaches, fevers, and signs of bleeding.  He does note some pedal edema at that end of the day.  He denies any problems with his medications and reports compliance.      Serostatus:  CMV D+/R-  EBV D-/R+  Toxo D-/R-    Patient Active Problem List    Diagnosis Date Noted     Status post coronary angiogram 06/23/2021     Priority: Medium     Elevated prostate specific antigen (PSA) 01/07/2021     Priority: Medium     Acute kidney failure, unspecified (H) 09/18/2020     Priority: Medium     Fever 08/31/2020     Priority: Medium     Lung mass 08/31/2020     Priority: Medium     Added automatically from request for surgery [id number]       Kidney stone 08/28/2020     Priority: Medium     Rhinovirus infection 08/20/2020     Priority: Medium     Chronic prostatitis without hematuria 08/19/2020     Priority: Medium     Prophylactic antibiotic 08/19/2020     Priority: Medium     Pneumonia 08/18/2020     Priority: Medium     Heart replaced by transplant (H) 05/26/2020     Priority: Medium     Added automatically from request for surgery [id number]       Biventricular heart failure (H) 05/17/2020     Priority: Medium     Added automatically from request for surgery [id number]       Status post heart transplantation (H) 05/17/2020     Priority: Medium     Added automatically from request for surgery [id number]       Hydronephrosis 05/12/2020     Priority: Medium     Chronic systolic congestive heart failure (H) 02/13/2019     Priority: Medium     Added automatically from request for surgery [id number]       Weakness 01/11/2019     Priority: Medium     Type 2 diabetes mellitus with hyperglycemia, with long-term current use of insulin (H) 07/20/2018     Priority: Medium     Left ventricular apical thrombus following MI (H) 07/06/2018     Priority: Medium     Hematuria 07/04/2018     Priority: Medium     Long term current use of  anticoagulant 07/03/2018     Priority: Medium     Hypotension, unspecified hypotension type 07/02/2018     Priority: Medium     Monomorphic ventricular tachycardia (H) 07/02/2018     Priority: Medium     Overview:   Added automatically from request for surgery [id number]       ASCVD (arteriosclerotic cardiovascular disease) 06/27/2018     Priority: Medium     CKD (chronic kidney disease) stage 3, GFR 30-59 ml/min 06/27/2018     Priority: Medium     Dyslipidemia 06/27/2018     Priority: Medium     ST elevation myocardial infarction involving left anterior descending (LAD) coronary artery (H) 06/27/2018     Priority: Medium     Type 2 diabetes mellitus with hyperglycemia (H) 06/27/2018     Priority: Medium     ANDREW (acute kidney injury) (H) 10/06/2016     Priority: Medium     Ureteral obstruction 10/06/2016     Priority: Medium     Unilateral inguinal hernia 09/09/2009     Priority: Medium     Overview:   IMO Update 10/11         PAST MEDICAL HISTORY:  Past Medical History:   Diagnosis Date     Acute kidney injury (H)      CKD (chronic kidney disease)      Coronary artery disease      Diabetes mellitus (H)      HTN (hypertension)      Hyperlipidemia      ICD (implantable cardioverter-defibrillator), single chamber- NOT dependent 7/17/2018     Ischemic cardiomyopathy      LV (left ventricular) mural thrombus      Paroxysmal atrial flutter (H)      RVF (right ventricular failure) (H)      Systolic heart failure (H)      Ventricular tachycardia (H)        CURRENT MEDICATIONS:  Prescription Medications as of 6/23/2021       Rx Number Disp Refills Start End Last Dispensed Date Next Fill Date Owning Pharmacy    X-1U-Wireless [employer] [place] PLUS test strip  300 strip 0 6/22/2020    [place], [place] - [address] [place] [address] [phone]    Sig: Check BG 3 times daily at meals and at bedtime.    Class: E-Prescribe    acetaminophen (TYLENOL) 325 MG tablet     5/29/2020        Sig: Take 2 tablets (650 mg) by mouth  every 4 hours as needed for other (multimodal surgical pain management along with NSAIDS and opioid medication as indicated based on pain control and physical function.)    Class: OTC    Route: Oral    amLODIPine (NORVASC) 5 MG tablet  90 tablet 3 2020    [facility] - [place] [address] [address]    Sig: Take 1 tablet (5 mg) by mouth daily    Class: E-Prescribe    Route: Oral    BD SILVANA U/F 32G X 4 MM insulin pen needle  600 each 3 10/9/2020    [facility] - [place] [address] [address]    Sig: Use 6 times daily.    Class: E-Prescribe    calcium carbonate 600 mg-vitamin D 400 units (CALTRATE) 600-400 MG-UNIT per tablet     2020        Sig: Take 1 tablet by mouth 2 times daily (with meals)    Class: OTC    Route: Oral    Renewals     Renewal requests to authorizing provider (Damari Ohaar, CARMEN MARKHAM) <b>prohibited</b>          Continuous Blood Gluc Sensor (FREESTYLE DARRELL 2 SENSOR) MISC  8 each 4 3/18/2021    [facility] [address] [place] [address] AT [facility]    Si Application every 14 days    Class: E-Prescribe    Route: Does not apply    everolimus (ZORTRESS) 0.5 MG tablet  210 tablet 11 2021    [facility] [address] [place] [address] AVE AT [address]    Sig: Take 4 tablets (2 mg) by mouth every morning AND 3 tablets (1.5 mg) every evening.    Class: No Print Out    Notes to Pharmacy: TXP DT 2020 (Heart) TXP Dischg DT 2020 DX Heart transplant Z94.1 [place] [facility] [place]    Route: Oral    hydrALAZINE (APRESOLINE) 50 MG tablet  540 tablet 3 10/13/2020    [facility] [place] [address] [address]    Sig: Take 2 tablets (100 mg) by mouth 3 times daily    Class: E-Prescribe    Route: Oral    insulin aspart (NOVOLOG PEN) 100 UNIT/ML pen  3 mL 0 10/27/2020    [facility] - [place] [address]  [facility]    Sig: Inject 1-14 units per custom scale  For Pre-Meal BG less than 140, no additional insulin needed   to 159 give 1 units.    to 179 give 2 units.    to 199 give 3 units.    to 219 give 4 units.    to 239 give 5 units.    to 259 give 6 units.    to 279 give 7 units.    to 299 give 8 units.    to 319 give 9 units.    to 339 give 10 units.    to 359 give 11 units.    to 379 give 12 units.   to 399 give 13 units.  BG >/=400 give 14 units.    Class: Local Print    insulin aspart (NOVOLOG PEN) 100 UNIT/ML pen  3 mL 0 9/24/2020    [address]    Sig: Inject 1-11 Units Subcutaneous At Bedtime Inject 1-11 units at bedtime per custom scale   For Bedtime BG less than 200, no additional insulin needed   to 219 give 1 units.    to 239 give 2 units.    to 259 give 3 units.    to 279 give 4 units.    to 299 give 5 units.    to 319 give 6 units.    to 339 give 7 units.    to 359 give 8 units    to 379 give 9 units.   to 399 give 10 units.  BG >/=400 give 11 units.    Class: Historical    Route: Subcutaneous    insulin glargine (LANTUS PEN) 100 UNIT/ML pen  15 mL 3 1/6/2021    [facility] [address] [place]     Sig: Inject 28 units subcutaneous at .    Class: E-Prescribe    Notes to Pharmacy: If Lantus is not covered by insurance, may substitute Basaglar at same dose and frequency.      loperamide (IMODIUM) 2 MG capsule  60 capsule 0 9/24/2020    [facility] [address]    Sig: Take 1 capsule (2 mg) by mouth 4 times daily as needed for diarrhea    Class: E-Prescribe    Route: Oral    magnesium oxide (MAG-OX) 400 (241.3 Mg) MG tablet  90 tablet 3 7/15/2020    [facility] [address]      Sig: Take 1 tablet (400 mg) by mouth 2 times daily    Class: E-Prescribe    Route: Oral    multivitamin w/minerals (THERA-VIT-M) tablet  90 tablet 3 9/24/2020    [facility] - [place], [place] - [address]    Sig: Take 1 tablet by mouth daily    Class: E-Prescribe    Route: Oral    NOVOLOG FLEXPEN 100 UNIT/ML soln  15 mL 3 11/27/2020    [facility] [address] [facility]    Sig: INJECT 1 TO 14 UNITS UNDER THE SKIN  WITH MEALS AND 1 TO 11 UNITS AT BEDTIME PER SLIDING SCALES, AND INJECT 1 UNIT UNDER THE SKIN PER 5 GRAMS OF CARBOHYDRATES WITH MEALS AND SNACKS    Class: E-Prescribe    pantoprazole (PROTONIX) 40 MG EC tablet  90 tablet 3 9/28/2020    [facility] [address]     Sig: Take 1 tablet (40 mg) by mouth every morning (before breakfast)    Class: E-Prescribe    Route: Oral    rosuvastatin (CRESTOR) 10 MG tablet  90 tablet 3 7/27/2020    [facility] - [address]     Sig: TAKE ONE TABLET BY MOUTH ONCE DAILY    Class: E-Prescribe    tacrolimus (GENERIC EQUIVALENT) 1 MG capsule  180 capsule 11 5/19/2021    [facility] #[id number] AT [facility] [address]    Sig: Take 3 capsules (3 mg) by mouth every morning AND 3 capsules (3 mg) every evening.    Class: E-Prescribe    Notes to Pharmacy: TXP DT 5/18/2020 (Heart) TXP Dischg DT 5/29/2020 DX Heart transplant Z94.1 TX Center [facility] ([place], [place])    Route: Oral      Hospital Medications as of 6/23/2021       Dose Frequency Start End    fentaNYL (PF) (SUBLIMAZE) injection 25-50 mcg (Ended) 25-50 mcg EVERY 15 MIN PRN 6/23/2021 6/23/2021    Admin Instructions: Start at lowest dose.May repeat x 1 after 15 minutes. For severe pain related to pulling the FEMORAL sheath out ONLY.Maximum total dose is 50 mcg.For ordered IV doses 1-100 mcg give IV Push  "undiluted over a minimum of 3-5 minutes.    Route: Intravenous    perflutren diluted 1mL to 2mL with saline (OPTISON) diluted injection 5 mL (Completed) 5 mL ONCE 6/23/2021 6/23/2021    Class: E-Prescribe    Route: Intravenous    potassium chloride ER (KLOR-CON M) CR tablet 20 mEq (Completed) 20 mEq ONCE PRN 6/23/2021 6/23/2021    Admin Instructions: DO NOT CRUSH.    Class: E-Prescribe    Route: Oral    sodium chloride (PF) 0.9% PF flush 10 mL 10 mL ONCE 6/23/2021     Class: E-Prescribe    Route: Intravenous            Exam:  BP (!) 128/92 (BP Location: Right arm, Patient Position: Chair, Cuff Size: Adult Regular)   Pulse 104   Ht 1.615 m (5' 3.58\")   Wt 84.8 kg (187 lb)   SpO2 97%   BMI 32.52 kg/m    In general, the patient is a pleasant male in no apparent distress.    HEENT: NC/AT. PERRLA. EOMI.  Sclerae white, not injected.    Neck:  No adenopathy, No thyromegaly.    COR: No jugular venous distention when sitting upright.  RRR.  Normal S1 S2 splits physiologically.  No murmur, rub click, or gallop.    Lungs:  CTA. No rhonchi.    Abdomen: soft, nontender, nondistended.  No organomegaly.  Extremities:  No clubbing or cyanosis, trace bilateral LE edema at ankles  Neuro: Alert & Oriented x 3, grossly non focal.  Integument: no open lesions, rashes, or jaundice.    Labs:  CBC RESULTS:   Lab Results   Component Value Date    WBC 5.3 06/23/2021    RBC 4.22 (L) 06/23/2021    HGB 10.1 (L) 06/23/2021    HCT 34.6 (L) 06/23/2021    MCV 82 06/23/2021    MCH 26.3 (L) 06/23/2021    MCHC 32.1 06/23/2021    RDW 12.7 06/23/2021     06/23/2021       BMP RESULTS:  Lab Results   Component Value Date     06/23/2021    POTASSIUM 3.6 06/23/2021    CHLORIDE 105 06/23/2021    CO2 24 06/23/2021    ANIONGAP 7 06/23/2021     (H) 06/23/2021    BUN 35 (H) 06/23/2021    CR 1.93 (H) 06/23/2021    GFRESTIMATED 37 (L) 06/23/2021    GFRESTBLACK 43 (L) 06/23/2021    ARLENE 9.3 06/23/2021      LIPID RESULTS:  Lab Results " "  Component Value Date    CHOL 168 2021    HDL 49 2021    LDL 75 2021    TRIG 220 (H) 2021    NHDL 119 2021       IMMUNOSUPPRESSANT LEVELS  Lab Results   Component Value Date    TACROL 3.5 (L) 2021    DOSTAC Not Provided 2021       No components found for: CK  Lab Results   Component Value Date    MAG 1.9 2021     Lab Results   Component Value Date    A1C 10.3 (H) 2021     Lab Results   Component Value Date    PHOS 3.4 2021     Lab Results   Component Value Date    NTBNPI 1,673 (H) 2020     Lab Results   Component Value Date    SAITESTMET [facility] 10/29/2020    SAICELL Class I 10/29/2020    [id number] Cw:15 10/29/2020    [id number] Cw:18 10/29/2020    SAIREPCOM  10/29/2020      Test performed by modified procedure. Serum heat inactivated and tested   by a modified (Blanchard) protocol including fetal calf serum addition.   High-risk, mfi >3,000. Mod-risk, mfi 500-3,000.       Lab Results   Component Value Date    SAIITESTME [facility] 10/29/2020    SAIICELL Class II 10/29/2020    [id number] None 10/29/2020    [id number] DR:10 10/29/2020    SAIIREPCOM  10/29/2020      Test performed by modified procedure. Serum heat inactivated and tested   by a modified (Blanchard) protocol including fetal calf serum addition.   High-risk, mfi >3,000. Mod-risk, mfi 500-3,000.       Lab Results   Component Value Date    CSPEC Plasma 2021       Echo 21  Global and regional left ventricular function is normal with an EF of 55-60%.  Right ventricular function, chamber size, wall motion, and thickness are  normal.  The inferior vena cava is normal.  No pericardial effusion is present.  No significant changes noted.    RHC and Angiogram  RA: /  RV: 37/14  PA: 36/25 (29)  PCWP: 22  PA Sat: 73.5%  William C.I./C.O.: 3.33/6.30  TD C.I./C.O.: 3.14/5.93  TP  PVR: 1.11 Woods Units  SVR: 901  Angiographically normal coronary arteries.      Assessment and Plan:  Mr. \"Red\" Aron " is a 60yr old male with a history of CAD (STEMI with ALYSSA to LAD 6/27/18), ICM (LVEF 20-25%) s/p HM3 LVAD (12/31/18), monomorphic VT (s/p ICD with shocks), LV thrombus, CKD, elevated PSA, pAF, HTN, HL, and DMII, now s/p OHT 5/18/20, who presents to clinic for routine follow up.    ICM, s/p OHT 5/18/20  His post-transplant course was c/b NSVT (with no hemodynamic compromise), leukocytosis with C Acnes growing from his former LVAD site (thought to be a contaminant, but treated with IV vanco --> po doxy through 6/1/20, 14d course total), and in the setting of donor blood growing S anginosus and Veillonella (also thought to be a contaminant, but treated with Vanco/zosyn --> Vanco/Flagyl for a total of 7 days). He ultimately discharged 5/29. He was readmitted 8/31-9/24 with fever, cough, diarrhea, and syncope.  He was initially treated as a fungal infection as his fungitell was +ve, but he did not clinically improve, nor did the size of the mass change.  Ultimately, tissue culture from the RUL mass showed abiotrophia defectiva (oral microbe), which was thought to be a contaminant, but did respond to antibiotics. He's currently off anti-microbials and recovering from a pneumonia perspective.       Serostatus:  - CMV D+/R-  - EBV D-/R+  - Toxo D-/R-    Immunosuppression:  - tacro, goal level 3-5.    - everolimus, goal level 6-8.    - If creatine is greater than 1.7 in 3 months- will stop Tacro and begin MMF 1000 BID and obtain Immuknow, RHC and Biopsy at 16 months visit.    - If creatine less than 1.7 in 3 months will continue current regimen and obtain allomap and echo at 16 month visit.    PPx:  - CAV: Continue rosuvastatin 10mg daily.  - GI:  Pantoprazole 40mg daily  - Osteoporosis:  Calcium/vitamin d supplements    Graft function:  - BPs:  Mildly elevated today.  Instructed patient to monitor BP and SBP > 140 call clinic.  Continue amlodipine 5mg daily and hydralazine 100mg TID  - Encouraged patient to continue regular  aerobic exercise aiming for at least 150 minutes of moderate physical activity or 75 minutes of vigorous physical activity - or an equal combination of both - each week. and follow low-salt, heart healthy diet.    - fluid status:  Mildly hypervolemic.  Begin torsemide 20mg daily    Routine screenings:    Derm: needs to schedule  Dental: needs to schedule  Colonoscopy: had in 2018  Breast/Prostate:following with Urology  Eye: just had 3 months ago  Flu/Pneumonia: Had flu shot/Covid x2r      DMII  following with endocrinology.       Elevated PSA  following with urology           Jenni Ortiz MD  Section Head - Advanced Heart Failure, Transplantation and Mechanical Circulatory Support  Director - Adult Congenital and Cardiovascular Genetics Center  Associate Professor of Medicine, [facility]    I spent 40 minutes in care of the patient today including reviewing personally reviewing echo images and cath lab results, today's labs examination and discussion of testing results and care recommendations with patient and documentation.      Please do not hesitate to contact me if you have any questions/concerns.     Sincerely,     Jenni Ortiz MD

## 2021-06-23 NOTE — PROGRESS NOTES
Pt prepped for CORS and right heart cath and biopsy.PIV was placed with ECHO.NS started.Fercho groins shaved and prepped and pulses marked and charted.IV contrast discharge instructions reviewed and signed and a copy was given to pt.Consent signed and questions answered.

## 2021-06-23 NOTE — DISCHARGE INSTRUCTIONS
"  Going home after a Coronary Angiogram    PROCEDURE SITE:   Femoral (Groin)  It is normal to have soreness, mild bruising or a small lump at the puncture site. You may shower but please do not use a hot tub, bath tub or pool for 2 days. Do not apply any lotion or powder near the site for 2 days. For 2 days when you cough, sneeze or push with a bowel movement place your hand over the puncture site and apply gentle pressure. For the first 2 days avoid squatting. Avoid lifting more than 10 pounds for at least 5 days. Further activity restrictions as below. If you feel a \"pop\" with pain and/or notice significant increase in pain, swelling or bleeding from the site- immediately lie down, press firmly on the site and proceed to the nearest medical facility.    MEDICATIONS:   1. If you are on Metformin (Glucophage) or any medications that contain this, you should not take it for at least 48 hours (2 days) from the time of your procedure. If you have baseline kidney problems, you may be instructed to also have a lab test drawn in 2-3 days before getting the okay to restart it.  2. If you have not been continued on other medications you were previously taking at home it is probably for reason though please discuss your concerns with any of your doctors.     DIET:  We recommend a diet low in saturated fat, trans fat and cholesterol. In addition it will be helpful to be cautious of sodium intake and carbohydrates. Try to increase the amount of lean meats you eat like fish and chicken, but avoid frying; and reduce the amount of red meat you eat. Eat more fresh fruits and vegetables and try to avoid canned and processed food. Please reference the handouts you received for more specific information.      OTHER INFORMATION:  1. If you are a smoker, quitting smoking will be one of the most important things you can do for yourself. There are nicotine replacement options or medications they might be able to be prescribed. Please " discuss this with your doctors. Consider calling the [employer]uit[employer] at [phone]-OULE ([phone]) as they can offer ongoing support after discharge.    CALL YOUR DOCTOR IF:  -You have a large or growing lump/bump around the procedure site  -The site is red, swollen, hot, tender or has drainage  -You have hives, a rash or unusual itching  -You have increasing or worsening shortness of breath or chest pain          Should you need to contact us:  Cardiology clinic for scheduling or triage nurse questions/concerns:  [phone]

## 2021-06-23 NOTE — PROGRESS NOTES
D/I/A: Pt roomed on 3C in bay 35.  Arrived via litter and accompanied by cath lab RN, off monitor. VSSA.  Rhythm upon arrival SR on monitor. Denies pain or sob.  Reviewed activity restrictions and when to notify RN, ie-changes to breathing or increased chest pressure or chest pain.  CCL access:  4F RFA, 7F RFV.  P: Continue to monitor status.  Discharge to home once meeting criteria.

## 2021-06-23 NOTE — NURSING NOTE
Transplant Coordinator Note    Reason for visit: First annual visit  Coordinator: Present   Caregiver:  none today    Health concerns addressed today:  1. Some achy pain in right chest.  2. Some swelling in the ankles at the end of the day.  3.       Immunosuppressants:  Tacro 3/3- goal 3-5  Evero 2/1.5- goal 6-8    Routine screenings:    Derm: needs to schedule  Dental: needs to schedule  Colonoscopy: had in 2018  Breast/Prostate:following with Urology  Eye: just had 3 months ago  Flu/Pneumonia: Had flu shot/Covid x2    Labs:   Creat 1.93  PSA 9.64 following with Urology      Additional Notes:   If you Creat is greater than 1.7 in 3 months- we will stop Tacro and do MMF 1000 BID/Evero instead.  Get an Immuknow if we make the changes.  RHC and Biopsy on return.      Echo, labs, angiogram reviewed with patient  Medication record reviewed and reconciled  Questions and concerns addressed  Pt verbalized an understanding of plan of care.     Patient Instructions  1. Start taking Torsemide 20mg once a day.  2. Keep checking your blood pressure at home.  If it starts to stay in the 140 range systolic- please call Angelika.  3. Check you Tacrolimus and Everolimus levels and creatinine checked in one month.      Next transplant clinic appointment:  in September for 16 month visit with echo/labs/MD visit.  Next lab draw:   Coordinator will call with all pending results.     Please call transplant coordinator with any questions:    Angelika Muller RN BSN   Post Heart Transplant Nurse Coordinator  [facility]  Questions: [phone]

## 2021-06-23 NOTE — NURSING NOTE
Chief Complaint   Patient presents with     Follow Up     Heart transplant annual     Vitals were taken and medications reconciled.    Drake Martines, EMT  4:10 PM

## 2021-06-23 NOTE — Clinical Note
dry, intact, no bleeding and no hematoma. 4fr and 7fr sheaths in place in RFA and RFV. Taped in place clean dry and intact

## 2021-06-24 LAB
COPATH REPORT: NORMAL
EBV DNA # SPEC NAA+PROBE: NORMAL {COPIES}/ML
EBV DNA SPEC NAA+PROBE-LOG#: NORMAL {LOG_COPIES}/ML

## 2021-06-25 ENCOUNTER — TELEPHONE (OUTPATIENT)
Dept: TRANSPLANT | Facility: CLINIC | Age: 61
End: 2021-06-25

## 2021-06-25 LAB
DONOR IDENTIFICATION: NORMAL
DSA COMMENTS: NORMAL
DSA PRESENT: NO
DSA TEST METHOD: NORMAL
IMMUKNOW IMMUNE CELL FUNCTION: 320 NG/ML
INTERPRETATION ECG - MUSE: NORMAL
ORGAN: NORMAL
SA1 CELL: NORMAL
SA1 COMMENTS: NORMAL
SA1 HI RISK ABY: NORMAL
SA1 MOD RISK ABY: NORMAL
SA1 TEST METHOD: NORMAL
SA2 CELL: NORMAL
SA2 COMMENTS: NORMAL
SA2 HI RISK ABY UA: NORMAL
SA2 MOD RISK ABY: NORMAL
SA2 TEST METHOD: NORMAL
UNACCEPTABLE ANTIGEN: NORMAL
UNOS CPRA: 0

## 2021-06-25 NOTE — TELEPHONE ENCOUNTER
VM left with pt reviewing remaining testing results from this week's visit.  No DSAs, Immuknow with moderate immune cell response, EBV and CMV negative.  Heart biopsy negative.  Pt encouraged to call back with any questions.

## 2021-06-28 LAB
ALLOMAP SCORE (EXTERNAL): 33 (ref 0–40)
NEGATIVE PREDICTIVE VALUE PERCENT (EXTERNAL): 99.1 %
POSITIVE PREDICTIVE VALUE PERCENT (EXTERNAL): 3.8 %

## 2021-07-19 NOTE — PLAN OF CARE
Had episode where he felt lightheaded and states his vision was blurry. Episode only lasted a minute or so per pt. MAP was high normal range this morning and PI tends to fluctuate. When checked after episode, MAP 81, yet PI continues to fluctuate and in the 5.3-6.0 range. Dr. Farah notified of episode and VS and LVAD numbers-will call cardio.   FAMILY HISTORY:  No pertinent family history in first degree relatives

## 2021-07-30 ENCOUNTER — TELEPHONE (OUTPATIENT)
Dept: TRANSPLANT | Facility: CLINIC | Age: 61
End: 2021-07-30

## 2021-07-30 DIAGNOSIS — Z94.1 STATUS POST HEART TRANSPLANTATION (H): ICD-10-CM

## 2021-07-30 RX ORDER — EVEROLIMUS 0.5 MG/1
TABLET ORAL
Qty: 21 TABLET | Refills: 0 | Status: SHIPPED | OUTPATIENT
Start: 2021-07-30 | End: 2021-12-20

## 2021-07-30 NOTE — TELEPHONE ENCOUNTER
Patient called to report that he is waiting for everolimus to be delevered and he will run out of medication today. Med should arrive today or tomorrow. Short-term refill sent to patient's local pharmacy. Advised patient to call back with further questions/concerns today.

## 2021-08-09 NOTE — PROGRESS NOTES
Mariusz EMILIE Martinezs  is being evaluated via a billable video visit.      How would you like to obtain your AVS? [employer]     For the video visit, send the invitation by: Text to cell phone: [phone]     Will anyone else be joining your video visit? No     Paola JOHNSON MA    Outcome for 08/09/21 11:39 AM :Patient stated they are not currently checking their glucose

## 2021-08-10 ENCOUNTER — VIRTUAL VISIT (OUTPATIENT)
Dept: ENDOCRINOLOGY | Facility: CLINIC | Age: 61
End: 2021-08-10
Payer: COMMERCIAL

## 2021-08-10 DIAGNOSIS — Z79.4 TYPE 2 DIABETES MELLITUS WITH HYPERGLYCEMIA, WITH LONG-TERM CURRENT USE OF INSULIN (H): Primary | ICD-10-CM

## 2021-08-10 DIAGNOSIS — E11.65 TYPE 2 DIABETES MELLITUS WITH HYPERGLYCEMIA, WITH LONG-TERM CURRENT USE OF INSULIN (H): Primary | ICD-10-CM

## 2021-08-10 PROCEDURE — 99214 OFFICE O/P EST MOD 30 MIN: CPT | Mod: GT | Performed by: PHYSICIAN ASSISTANT

## 2021-08-10 RX ORDER — INSULIN LISPRO 100 [IU]/ML
INJECTION, SOLUTION INTRAVENOUS; SUBCUTANEOUS
Qty: 15 ML | Refills: 4 | Status: SHIPPED | OUTPATIENT
Start: 2021-08-10 | End: 2021-12-02

## 2021-08-10 NOTE — PROGRESS NOTES
Due to the COVID 19 pandemic this visit was converted to a video visit in order to help prevent spread of infection in this patient and the general population.    Time of start: 10:30 am  Time of end: 10:46 am  Total duration of video visit: 16 minutes.    CHANELL Sharp ( Pepe ) is a 60 year old male with type 2 diabetes mellitus. Video visit today for diabetes care.  Pt was admitted 5/17/2020-5/29/2020 and underwent a heart transplant on 5/18/2020.  Pt's hx is significant for type 2 diabetes mellitus dx 4 years ago, HTN, hyperlipidemia, hx of ischemic heart disease s/p STEMI with ALYSSA 2019, LVAD placement, Stage 3 CKD, and obesity.  Red was also admitted 5/12-5/15/2020 for ANDREW due to nephrolithiasis complicated by hydronephrosis/ANDREW.  For his diabetes, pt is currently taking Lantus 28 units subcutaneous each pm and prescribed to take Humalog 1 unit/5 gms CHO with meals with correction scale  ( 1 unit/20 for BG > 140 ) and bedtime correction scale ( 1 unit/25 for BG > 200).   Pt's A1C most recent A1C was high at 10.0 %. He was without insulin for awhile due to limited finances.  His previous A1C  was 6.2 % on 10/29/2020 and A1C was 7.4 % on 8/19/2020. He is anemic.  His insurance will no longer cover his DexcomG6 sensor supplies, so he is not using his Dexcom sensor.  I have no glucose meter download today.  Pt states he has not been checking his blood sugar at home.   On ROS today, main complaint is fatigue.  He continues to walk daily.  Breathing stable at this time.. No cough, fever or chills. Pt received the COVID19 vaccine ( Pfizer).  Mild tingling in both feet. He denies foot ulcers.  Pt denies headaches, blurred vision, n/v, abd pain, diarrhea, dysuria or hematuria.    Diabetes Care  Retinopathy:none; pt seen by Oph in Jan 2021.  Nephropathy:yes- hx of CKD/ANDREW.  Neuropathy: mild tingling in both feet.  Foot Exam:no exam today.  Taking aspirin: yes.  Lipids: LDL 75 in 6/2021. Pt is taking Crestor.  CAD:  yes; s/p heart transplant on 5/18/2020.  Depression: no.  Insulin: Basal and meal time insulin with correction scale ( 1 unit/20 for BG > 140 with meals and > 200 at bedtime).  Testing: glucose meter with he is not using. Pt has a DexcomG6 sensor - not using sensor since his insurance will not cover his Dexcom supplies.    ROS  See under HPI.    Allergies  No Known Allergies    Medications  Current Outpatient Medications   Medication Sig Dispense Refill     ACCU-CHEK [place] PLUS test strip Check BG 3 times daily at meals and at bedtime. 300 strip 0     acetaminophen (TYLENOL) 325 MG tablet Take 2 tablets (650 mg) by mouth every 4 hours as needed for other (multimodal surgical pain management along with NSAIDS and opioid medication as indicated based on pain control and physical function.)       amLODIPine (NORVASC) 5 MG tablet Take 1 tablet (5 mg) by mouth daily 90 tablet 3     BD SILVANA U/F 32G X 4 MM insulin pen needle Use 6 times daily. 600 each 3     calcium carbonate 600 mg-vitamin D 400 units (CALTRATE) 600-400 MG-UNIT per tablet Take 1 tablet by mouth 2 times daily (with meals)       everolimus (ZORTRESS) 0.5 MG tablet Take 4 tablets (2 mg) by mouth every morning AND 3 tablets (1.5 mg) every evening. 21 tablet 0     HUMALOG KWIKPEN 100 UNIT/ML soln Inject 1 unit/5 gms CHO with meals and snacks, plus correction.Approx 55 units in 24 hrs. 15 mL 4     hydrALAZINE (APRESOLINE) 50 MG tablet Take 2 tablets (100 mg) by mouth 3 times daily 540 tablet 3     insulin glargine (LANTUS PEN) 100 UNIT/ML pen Inject 28 units subcutaneous at hs. 15 mL 3     loperamide (IMODIUM) 2 MG capsule Take 1 capsule (2 mg) by mouth 4 times daily as needed for diarrhea 60 capsule 0     magnesium oxide (MAG-OX) 400 (241.3 Mg) MG tablet Take 1 tablet (400 mg) by mouth 2 times daily 90 tablet 3     multivitamin w/minerals (THERA-VIT-M) tablet Take 1 tablet by mouth daily 90 tablet 3     pantoprazole (PROTONIX) 40 MG EC tablet Take 1 tablet (40  mg) by mouth every morning (before breakfast) 90 tablet 3     rosuvastatin (CRESTOR) 10 MG tablet TAKE ONE TABLET BY MOUTH ONCE DAILY 90 tablet 3     tacrolimus (GENERIC EQUIVALENT) 1 MG capsule Take 3 capsules (3 mg) by mouth every morning AND 3 capsules (3 mg) every evening. 180 capsule 11     torsemide (DEMADEX) 20 MG tablet Take 1 tablet (20 mg) by mouth daily 90 tablet 3       Family History  Mother and father with hx of type 2 DM.  Social History   reports that he has never smoked. He has never used smokeless tobacco. He reports that he does not drink alcohol and does not use drugs.     Past Medical History  Past Medical History:   Diagnosis Date     Acute kidney injury (H)      CKD (chronic kidney disease)      Coronary artery disease      Diabetes mellitus (H)      HTN (hypertension)      Hyperlipidemia      ICD (implantable cardioverter-defibrillator), single chamber- NOT dependent 7/17/2018     Ischemic cardiomyopathy      LV (left ventricular) mural thrombus      Paroxysmal atrial flutter (H)      RVF (right ventricular failure) (H)      Systolic heart failure (H)      Ventricular tachycardia (H)        Past Surgical History:   Procedure Laterality Date     BRONCHOSCOPY (RIGID OR FLEXIBLE), DIAGNOSTIC N/A 9/15/2020    Procedure: BRONCHOSCOPY, WITH BRONCHOALVEOLAR LAVAGE;  Surgeon: Elder Mejia MD;  Location:  GI     BRONCHOSCOPY (RIGID OR FLEXIBLE), DIAGNOSTIC N/A 9/17/2020    Procedure: BRONCHOSCOPY, WITH BRONCHOALVEOLAR LAVAGE;  Surgeon: Bill Lemus MD;  Location:  OR     BRONCHOSCOPY RIGID OR FLEXIBLE W/TRANSENDOSCOPIC ENDOBRONCHIAL ULTRASOUND GUIDED Right 9/8/2020    Procedure: BRONCHOSCOPY, WITH ENDOBRONCHIAL ULTRASOUND;  Surgeon: Donny Mercedes MD;  Location:  GI     COLONOSCOPY N/A 12/7/2018    Procedure: COLONOSCOPY;  Surgeon: Krish Mercado MD;  Location: [facility]     CV CORONARY ANGIOGRAM N/A 6/23/2021    Procedure: CV CORONARY ANGIOGRAM;  Surgeon: Brian Long MD;   [facility]     CV HEART BIOPSY N/A 5/24/2020    Procedure: Heart Biopsy;  Surgeon: Kit Bacon MD;  [facility]     CV HEART BIOPSY N/A 6/1/2020    Procedure: CV HEART BIOPSY;  Surgeon: Kit Bacon MD;  [facility]     CV HEART BIOPSY N/A 6/8/2020    Procedure: CV HEART BIOPSY;  Surgeon: Kit Bacon MD;  [facility]     CV HEART BIOPSY N/A 6/15/2020    Procedure: CV HEART BIOPSY;  Surgeon: Kit Bacon MD;  Location:  [facility]ARD[facility] CATH [facility]     CV HEART BIOPSY N/A 6/30/2020    Procedure: CV HEART BIOPSY;  Surgeon: Kit Bacon MD;  Location:  [facility]ARD[facility] CATH LAB     CV HEART BIOPSY N/A 7/14/2020    Procedure: CV HEART BIOPSY;  Surgeon: Brian Long MD;  Location:  [facility]ARD[facility] CATH [facility]     CV HEART BIOPSY N/A 7/29/2020    Procedure: CV HEART BIOPSY;  Surgeon: Momo Hunt MD;  Location:  [facility]ART CARDIAC CATH LAB     CV HEART BIOPSY N/A 8/13/2020    Procedure: CV HEART BIOPSY;  Surgeon: Khris Mcclelland MD;  Location:  [facility] CATH [facility]     CV HEART BIOPSY N/A 8/26/2020    Procedure: CV HEART BIOPSY;  Surgeon: Momo Hunt MD;  Location:  [facility]ARD[facility] CATH LAB     CV HEART BIOPSY N/A 9/30/2020    Procedure: CV HEART BIOPSY;  Surgeon: Artemio Ulloa MD;  Location:  [facility]ARD[facility] CATH [facility]     CV HEART BIOPSY N/A 10/29/2020    Procedure: CV HEART BIOPSY;  Surgeon: Kit Bacon MD;  Location:  [facility]ATH [facility]     CV HEART BIOPSY N/A 1/5/2021    Procedure: CV HEART BIOPSY;  Surgeon: Carlin Garcia MD;  Location:  [facility]ATH [facility]     CV HEART BIOPSY N/A 6/23/2021    Procedure: CV HEART BIOPSY;  Surgeon: Brian Long MD;  Location:  [facility]ARD[facility]ATH [facility]     CV RIGHT HEART CATH MEASUREMENTS RECORDED N/A 2/19/2019    Procedure: RHC;   Surgeon: Brian Long MD;  Location:  [facility]ATH [facility]     CV RIGHT HEART CATH MEASUREMENTS RECORDED N/A 7/5/2019    Procedure: CV RIGHT HEART CATH;  Surgeon: Khris Mcclelland MD;  Location:  HEART CARD[facility] CATH LAB     CV RIGHT HEART CATH MEASUREMENTS RECORDED N/A 5/24/2020    Procedure: Right Heart Cath;  Surgeon: Kit Bacon MD;  Location:  [facility]ARD[facility] CATH LAB     CV RIGHT HEART CATH MEASUREMENTS RECORDED N/A 6/1/2020    Procedure: CV RIGHT HEART CATH;  Surgeon: Kit Bacon MD;  Location:  [facility]ARD[facility] CATH LAB     CV RIGHT HEART CATH MEASUREMENTS RECORDED N/A 6/8/2020    Procedure: CV RIGHT HEART CATH;  Surgeon: Kit Bacon MD;  Location:  [facility]ARD[facility] CATH [facility]     CV RIGHT HEART CATH MEASUREMENTS RECORDED N/A 6/15/2020    Procedure: CV RIGHT HEART CATH;  Surgeon: Kit Bacon MD;  Location:  [facility]ARD[facility] CATH LAB     CV RIGHT HEART CATH MEASUREMENTS RECORDED N/A 6/30/2020    Procedure: CV RIGHT HEART CATH;  Surgeon: Kit Bacon MD;  Location:  [facility]ARD[facility] CATH LAB     CV RIGHT HEART CATH MEASUREMENTS RECORDED N/A 7/14/2020    Procedure: CV RIGHT HEART CATH;  Surgeon: Brian Long MD;  Location:  [facility]ARD[facility] CATH LAB     CV RIGHT HEART CATH MEASUREMENTS RECORDED N/A 7/29/2020    Procedure: CV RIGHT HEART CATH;  Surgeon: Momo Hunt MD;  Location:  [facility]ARD[facility] CATH [facility]     CV RIGHT HEART CATH MEASUREMENTS RECORDED N/A 8/13/2020    Procedure: CV RIGHT HEART CATH;  Surgeon: Khris Mcclelland MD;  Location:  [facility]ATH [facility]     CV RIGHT HEART CATH MEASUREMENTS RECORDED N/A 8/26/2020    Procedure: CV RIGHT HEART CATH;  Surgeon: Momo Hunt MD;  Location:  [facility]ATH [facility]     CV RIGHT HEART CATH MEASUREMENTS RECORDED N/A 9/30/2020    Procedure: Right Heart Cath;  Surgeon: Artemio Ulloa MD;  Location:  [facility]ARD[facility]ATH [facility]      CV RIGHT HEART CATH MEASUREMENTS RECORDED N/A 10/29/2020    Procedure: CV RIGHT HEART CATH;  Surgeon: Kit Bacon MD;  Location:  [facility]ARD[facility] CATH [facility]     CV RIGHT HEART CATH MEASUREMENTS RECORDED N/A 1/5/2021    Procedure: CV RIGHT HEART CATH;  Surgeon: Carlin Garcia MD;  Location:  [facility]ARD[facility] CATH LAB     CV RIGHT HEART CATH MEASUREMENTS RECORDED N/A 6/23/2021    Procedure: CV RIGHT HEART CATH;  Surgeon: Brian Long MD;  Location:  HE[facility] CARD[facility] CATH LAB     ENDOBRONCHIAL ULTRASOUND FLEXIBLE N/A 9/17/2020    Procedure: BRONCHOSCOPY, flexible, endobronchial ultrasound with transbronchial biopsies, endobronchial biopsies;  Surgeon: Bill Lemus MD;  Location: [facility]     HC PRQ [id number] CORONARY ANGIOPLASTY ADDL BRANCH      PCI and ALYSSA to ostial LAD on 6/27/18     IMPLANT AUTOMATIC IMPLANTABLE CARDIOVERTER DEFIBRILLATOR       INSERT VENTRICULAR ASSIST DEVICE LEFT (HEARTMATE II) N/A 12/31/2018    Procedure: Median Sternotomy, On Cardiopulmonary Bypass Pump, Insertion of Left Ventricular Assist Device (Heartmate III);  Surgeon: Kamaljit Baker MD;  Location: [facility]     PICC DOUBLE LUMEN PLACEMENT Right 05/22/2020    5FR DL PICC inserted at right basilic vein, length 38cm.     TRANSPLANT HEART RECIPIENT AFTER HEART MATE N/A 5/18/2020    Procedure: REDO MEDIAN STERNOTOMY, LYSIS OF ADHESIONS, ON CARDIOPULMONARY BYPASS PUMP, HEART TRANSPLANT RECIPIENT, REMOVAL OF LEFT VENTRICULAR ASSIST DEVICE, REMOVAL OF AUTOMATED IMPLANTABLE CARDIOVERTER DEFIBRILLATOR;  Surgeon: Elder Willis MD;  Location: [facility]       Physical Exam    No exam today.    RESULTS  Creatinine   Date Value Ref Range Status   06/23/2021 1.93 (H) 0.66 - 1.25 mg/dL Final     GFR Estimate   Date Value Ref Range Status   06/23/2021 37 (L) >60 mL/min/[1.73_m2] Final     Comment:     Non  GFR Calc  Starting 12/18/2018, serum creatinine based estimated GFR (eGFR) will be   calculated using the  Chronic Kidney Disease Epidemiology Collaboration   (CKD-EPI) equation.       Hemoglobin A1C   Date Value Ref Range Status   06/23/2021 10.3 (H) 0 - 5.6 % Final     Comment:     Normal <5.7% Prediabetes 5.7-6.4%  Diabetes 6.5% or higher - adopted from ADA   consensus guidelines.       Potassium   Date Value Ref Range Status   06/23/2021 3.6 3.4 - 5.3 mmol/L Final     ALT   Date Value Ref Range Status   06/23/2021 26 0 - 70 U/L Final     AST   Date Value Ref Range Status   06/23/2021 22 0 - 45 U/L Final     TSH   Date Value Ref Range Status   03/03/2021 1.87 0.40 - 4.00 mU/L Final     T4 Free   Date Value Ref Range Status   03/03/2021 1.08 0.76 - 1.46 ng/dL Final       Cholesterol   Date Value Ref Range Status   06/23/2021 168 <200 mg/dL Final   10/29/2020 161 <200 mg/dL Final     HDL Cholesterol   Date Value Ref Range Status   06/23/2021 49 >39 mg/dL Final   10/29/2020 51 >39 mg/dL Final     LDL Cholesterol Calculated   Date Value Ref Range Status   06/23/2021 75 <100 mg/dL Final     Comment:     Desirable:       <100 mg/dl   10/29/2020 81 <100 mg/dL Final     Comment:     Desirable:       <100 mg/dl     Triglycerides   Date Value Ref Range Status   06/23/2021 220 (H) <150 mg/dL Final     Comment:     Borderline high:  150-199 mg/dl  High:             200-499 mg/dl  Very high:       >499 mg/dl     10/29/2020 144 <150 mg/dL Final     A1C   10.0 %  3/3/2021  A1C   6.2    10/29/2020  A1C   7.8    6/15/2020    ASSESSMENT/PLAN:      1.  TYPE 2 DIABETES MELLITUS: Uncontrolled type 2 diabetes mellitus.   I have no blood sugar data today.  Red has not been checking his blood sugar at home.  I asked him to check his fasting blood sugar each am and also check his blood sugar prelunch and predinner DAILY and take his insulin daily has prescribed.  I plan to call him in 4 weeks.  No change in insulin doses today.  Avoid use of Metformin and oral diabetic meds due to CKD and lower GFR.  Pt is unable to afford his DexcomG6  sensor supplies.  Medicare will now cover Freestyle Libre2 sensors- RX sent to patient's local pharmacy.  Pt was seen by Oph in Jan 2021 without retinopathy.  He reports mild tingling in his feet.  No foot ulcers at this time.  He is taking ASA daily.  Red received the COVID19 vaccines (Pfizer).    2.  CKD/ANDREW: Most recent creat was 2.05 with GFR 34 mL/min in July 2021.  Avoid use of Metformin.  Hold on starting SGLT2 at this time due to lower GFR.    3.  HX OF ISCHEMIC CARDIOMYOPATHY: S/P heart transplant on 5/18/2020 and followed closely by transplant staff here.     4.  FOLLOW UP : with me in 1 month with blood sugar data.    Total time spent reviewing chart note and labs today = 8 minutes.  Total time for video visit today = 16  minutes.  Total time for documentation today = 15 minutes.    TOTAL TIME FOR VIDEO VISIT TODAY=  39 minutes.    Jeannette Schafer PA-C

## 2021-08-10 NOTE — LETTER
8/10/2021       RE: Mariusz Sharp  [address] [facility] [address]     Dear Colleague,    Thank you for referring your patient, Mariusz Sharp, to the [facility] ENDOCRINOLOGY [facility] [place] at [facility]. Please see a copy of my visit note below.    Mariusz Sharp  is being evaluated via a billable video visit.      How would you like to obtain your AVS? MyChart     For the video visit, send the invitation by: Text to cell phone: [phone]     Will anyone else be joining your video visit? No     Poala JOHNSON MA    Outcome for 08/09/21 11:39 AM :Patient stated they are not currently checking their glucose       Due to the COVID 19 pandemic this visit was converted to a video visit in order to help prevent spread of infection in this patient and the general population.    Time of start: 10:30 am  Time of end: 10:46 am  Total duration of video visit: 16 minutes.    CHANELL Moon ) ISABEL Sharp is a 60 year old male with type 2 diabetes mellitus. Video visit today for diabetes care.  Pt was admitted 5/17/2020-5/29/2020 and underwent a heart transplant on 5/18/2020.  Pt's hx is significant for type 2 diabetes mellitus dx 4 years ago, HTN, hyperlipidemia, hx of ischemic heart disease s/p STEMI with ALYSSA 2019, LVAD placement, Stage 3 CKD, and obesity.  Red was also admitted 5/12-5/15/2020 for ANDREW due to nephrolithiasis complicated by hydronephrosis/ANDREW.  For his diabetes, pt is currently taking Lantus 28 units subcutaneous each pm and prescribed to take Humalog 1 unit/5 gms CHO with meals with correction scale  ( 1 unit/20 for BG > 140 ) and bedtime correction scale ( 1 unit/25 for BG > 200).   Pt's A1C most recent A1C was high at 10.0 %. He was without insulin for awhile due to limited finances.  His previous A1C  was 6.2 % on 10/29/2020 and A1C was 7.4 % on 8/19/2020. He is anemic.  His insurance will no longer cover his DexcomG6 sensor supplies, so he is  not using his Dexcom sensor.  I have no glucose meter download today.  Pt states he has not been checking his blood sugar at home.   On ROS today, main complaint is fatigue.  He continues to walk daily.  Breathing stable at this time.. No cough, fever or chills. Pt received the COVID19 vaccine ( Pfizer).  Mild tingling in both feet. He denies foot ulcers.  Pt denies headaches, blurred vision, n/v, abd pain, diarrhea, dysuria or hematuria.    Diabetes Care  Retinopathy:none; pt seen by Oph in Jan 2021.  Nephropathy:yes- hx of CKD/ANDREW.  Neuropathy: mild tingling in both feet.  Foot Exam:no exam today.  Taking aspirin: yes.  Lipids: LDL 75 in 6/2021. Pt is taking Crestor.  CAD: yes; s/p heart transplant on 5/18/2020.  Depression: no.  Insulin: Basal and meal time insulin with correction scale ( 1 unit/20 for BG > 140 with meals and > 200 at bedtime).  Testing: glucose meter with he is not using. Pt has a DexcomG6 sensor - not using sensor since his insurance will not cover his Dexcom supplies.    ROS  See under HPI.    Allergies  No Known Allergies    Medications  Current Outpatient Medications   Medication Sig Dispense Refill     ACCU-CHEK [place] PLUS test strip Check BG 3 times daily at meals and at bedtime. 300 strip 0     acetaminophen (TYLENOL) 325 MG tablet Take 2 tablets (650 mg) by mouth every 4 hours as needed for other (multimodal surgical pain management along with NSAIDS and opioid medication as indicated based on pain control and physical function.)       amLODIPine (NORVASC) 5 MG tablet Take 1 tablet (5 mg) by mouth daily 90 tablet 3     BD SILVANA U/F 32G X 4 MM insulin pen needle Use 6 times daily. 600 each 3     calcium carbonate 600 mg-vitamin D 400 units (CALTRATE) 600-400 MG-UNIT per tablet Take 1 tablet by mouth 2 times daily (with meals)       everolimus (ZORTRESS) 0.5 MG tablet Take 4 tablets (2 mg) by mouth every morning AND 3 tablets (1.5 mg) every evening. 21 tablet 0     HUMALOG KWIKPEN 100  UNIT/ML soln Inject 1 unit/5 gms CHO with meals and snacks, plus correction.Approx 55 units in 24 hrs. 15 mL 4     hydrALAZINE (APRESOLINE) 50 MG tablet Take 2 tablets (100 mg) by mouth 3 times daily 540 tablet 3     insulin glargine (LANTUS PEN) 100 UNIT/ML pen Inject 28 units subcutaneous at hs. 15 mL 3     loperamide (IMODIUM) 2 MG capsule Take 1 capsule (2 mg) by mouth 4 times daily as needed for diarrhea 60 capsule 0     magnesium oxide (MAG-OX) 400 (241.3 Mg) MG tablet Take 1 tablet (400 mg) by mouth 2 times daily 90 tablet 3     multivitamin w/minerals (THERA-VIT-M) tablet Take 1 tablet by mouth daily 90 tablet 3     pantoprazole (PROTONIX) 40 MG EC tablet Take 1 tablet (40 mg) by mouth every morning (before breakfast) 90 tablet 3     rosuvastatin (CRESTOR) 10 MG tablet TAKE ONE TABLET BY MOUTH ONCE DAILY 90 tablet 3     tacrolimus (GENERIC EQUIVALENT) 1 MG capsule Take 3 capsules (3 mg) by mouth every morning AND 3 capsules (3 mg) every evening. 180 capsule 11     torsemide (DEMADEX) 20 MG tablet Take 1 tablet (20 mg) by mouth daily 90 tablet 3       Family History  Mother and father with hx of type 2 DM.  Social History   reports that he has never smoked. He has never used smokeless tobacco. He reports that he does not drink alcohol and does not use drugs.     Past Medical History  Past Medical History:   Diagnosis Date     Acute kidney injury (H)      CKD (chronic kidney disease)      Coronary artery disease      Diabetes mellitus (H)      HTN (hypertension)      Hyperlipidemia      ICD (implantable cardioverter-defibrillator), single chamber- NOT dependent 7/17/2018     Ischemic cardiomyopathy      LV (left ventricular) mural thrombus      Paroxysmal atrial flutter (H)      RVF (right ventricular failure) (H)      Systolic heart failure (H)      Ventricular tachycardia (H)        Past Surgical History:   Procedure Laterality Date     BRONCHOSCOPY (RIGID OR FLEXIBLE), DIAGNOSTIC N/A 9/15/2020    Procedure:  BRONCHOSCOPY, WITH BRONCHOALVEOLAR LAVAGE;  Surgeon: Elder Mejia MD;  Location:  GI     BRONCHOSCOPY (RIGID OR FLEXIBLE), DIAGNOSTIC N/A 9/17/2020    Procedure: BRONCHOSCOPY, WITH BRONCHOALVEOLAR LAVAGE;  Surgeon: Bill Lemus MD;  Location:  OR     BRONCHOSCOPY RIGID OR FLEXIBLE W/TRANSENDOSCOPIC ENDOBRONCHIAL ULTRASOUND GUIDED Right 9/8/2020    Procedure: BRONCHOSCOPY, WITH ENDOBRONCHIAL ULTRASOUND;  Surgeon: Donny Mercedes MD;  Location:  GI     COLONOSCOPY N/A 12/7/2018    Procedure: COLONOSCOPY;  Surgeon: Krish Mercado MD;  Location:  OR     CV CORONARY ANGIOGRAM N/A 6/23/2021    Procedure: CV CORONARY ANGIOGRAM;  Surgeon: Brian Long MD;  Location:  HEART CARDIAC CATH LAB     CV HEART BIOPSY N/A 5/24/2020    Procedure: Heart Biopsy;  Surgeon: Kit Bacon MD;  Location:  HEART CARDIAC CATH LAB     CV HEART BIOPSY N/A 6/1/2020    Procedure: CV HEART BIOPSY;  Surgeon: Kit Bacon MD;  Location:  HEART CARDIAC CATH LAB     CV HEART BIOPSY N/A 6/8/2020    Procedure: CV HEART BIOPSY;  Surgeon: Kit Bacon MD;  Location:  HEART CARDIAC CATH LAB     CV HEART BIOPSY N/A 6/15/2020    Procedure: CV HEART BIOPSY;  Surgeon: Kit Bacon MD;  Location:  HEART CARDIAC CATH LAB     CV HEART BIOPSY N/A 6/30/2020    Procedure: CV HEART BIOPSY;  Surgeon: Kit Bacon MD;  Location:  HEART CARDIAC CATH LAB     CV HEART BIOPSY N/A 7/14/2020    Procedure: CV HEART BIOPSY;  Surgeon: Brian Long MD;  Location:  HEART CARDIAC CATH LAB     CV HEART BIOPSY N/A 7/29/2020    Procedure: CV HEART BIOPSY;  Surgeon: Momo Hunt MD;  Location:  HEART CARDIAC CATH LAB     CV HEART BIOPSY N/A 8/13/2020    Procedure: CV HEART BIOPSY;  Surgeon: Khris Mcclelland MD;  Location:  HEART CARDIAC CATH LAB     CV HEART BIOPSY N/A 8/26/2020    Procedure: CV HEART BIOPSY;  Surgeon: Marilee  Momo Salcedo MD;  Location:  HEART CARDIAC CATH LAB     CV HEART BIOPSY N/A 9/30/2020    Procedure: CV HEART BIOPSY;  Surgeon: Artemio Ulloa MD;  Location: [facility] HEART CARDIAC CATH LAB     CV HEART BIOPSY N/A 10/29/2020    Procedure: CV HEART BIOPSY;  Surgeon: Kit Bacon MD;  Location:  HEART CARDIAC CATH LAB     CV HEART BIOPSY N/A 1/5/2021    Procedure: CV HEART BIOPSY;  Surgeon: Carlin Garcia MD;  Location: [facility] HEART CARDIAC CATH LAB     CV HEART BIOPSY N/A 6/23/2021    Procedure: CV HEART BIOPSY;  Surgeon: Brian Long MD;  Location:  HEART CARDIAC CATH LAB     CV RIGHT HEART CATH MEASUREMENTS RECORDED N/A 2/19/2019    Procedure: RHC;  Surgeon: Brian Long MD;  Location:  HEART CARDIAC CATH LAB     CV RIGHT HEART CATH MEASUREMENTS RECORDED N/A 7/5/2019    Procedure: CV RIGHT HEART CATH;  Surgeon: Khris Mcclelland MD;  Location:  HEART CARDIAC CATH LAB     CV RIGHT HEART CATH MEASUREMENTS RECORDED N/A 5/24/2020    Procedure: Right Heart Cath;  Surgeon: Kit Bacon MD;  Location:  HEART CARDIAC CATH LAB     CV RIGHT HEART CATH MEASUREMENTS RECORDED N/A 6/1/2020    Procedure: CV RIGHT HEART CATH;  Surgeon: Kit Bacon MD;  Location:  HEART CARDIAC CATH LAB     CV RIGHT HEART CATH MEASUREMENTS RECORDED N/A 6/8/2020    Procedure: CV RIGHT HEART CATH;  Surgeon: Kit Bacon MD;  Location:  HEART CARDIAC CATH LAB     CV RIGHT HEART CATH MEASUREMENTS RECORDED N/A 6/15/2020    Procedure: CV RIGHT HEART CATH;  Surgeon: Kit Bacon MD;  Location:  HEART CARDIAC CATH LAB     CV RIGHT HEART CATH MEASUREMENTS RECORDED N/A 6/30/2020    Procedure: CV RIGHT HEART CATH;  Surgeon: Kit Bacon MD;  Location:  HEART CARDIAC CATH LAB     CV RIGHT HEART CATH MEASUREMENTS RECORDED N/A 7/14/2020    Procedure: CV RIGHT HEART CATH;  Surgeon: Brian Long MD;  Location:   HEART CARDIAC CATH LAB     CV RIGHT HEART CATH MEASUREMENTS RECORDED N/A 7/29/2020    Procedure: CV RIGHT HEART CATH;  Surgeon: Momo Hunt MD;  Location:  HEART CARDIAC CATH LAB     CV RIGHT HEART CATH MEASUREMENTS RECORDED N/A 8/13/2020    Procedure: CV RIGHT HEART CATH;  Surgeon: Khris Mcclelland MD;  Location:  HEART CARDIAC CATH LAB     CV RIGHT HEART CATH MEASUREMENTS RECORDED N/A 8/26/2020    Procedure: CV RIGHT HEART CATH;  Surgeon: Momo Hunt MD;  Location:  HEART CARDIAC CATH LAB     CV RIGHT HEART CATH MEASUREMENTS RECORDED N/A 9/30/2020    Procedure: Right Heart Cath;  Surgeon: Artemio Ulloa MD;  Location:  HEART CARDIAC CATH LAB     CV RIGHT HEART CATH MEASUREMENTS RECORDED N/A 10/29/2020    Procedure: CV RIGHT HEART CATH;  Surgeon: Kit Bacon MD;  Location:  HEART CARDIAC CATH LAB     CV RIGHT HEART CATH MEASUREMENTS RECORDED N/A 1/5/2021    Procedure: CV RIGHT HEART CATH;  Surgeon: Carlin Garcia MD;  Location:  HEART CARDIAC CATH LAB     CV RIGHT HEART CATH MEASUREMENTS RECORDED N/A 6/23/2021    Procedure: CV RIGHT HEART CATH;  Surgeon: Brian Long MD;  Location:  HEART CARDIAC CATH LAB     ENDOBRONCHIAL ULTRASOUND FLEXIBLE N/A 9/17/2020    Procedure: BRONCHOSCOPY, flexible, endobronchial ultrasound with transbronchial biopsies, endobronchial biopsies;  Surgeon: Bill Lemus MD;  Location: [facility] OR      PRQ TRLUML CORONARY ANGIOPLASTY ADDL BRANCH      PCI and ALYSSA to ostial LAD on 6/27/18     IMPLANT AUTOMATIC IMPLANTABLE CARDIOVERTER DEFIBRILLATOR       INSERT VENTRICULAR ASSIST DEVICE LEFT (HEARTMATE II) N/A 12/31/2018    Procedure: Median Sternotomy, On Cardiopulmonary Bypass Pump, Insertion of Left Ventricular Assist Device (Heartmate III);  Surgeon: Kamaljit Baker MD;  [facility]: [facility] OR     PICC DOUBLE LUMEN PLACEMENT Right 05/22/2020    5FR DL PICC inserted at right basilic vein, length 38cm.     TRANSPLANT HEART  RECIPIENT AFTER HEART MATE N/A 5/18/2020    Procedure: REDO MEDIAN STERNOTOMY, LYSIS OF ADHESIONS, ON CARDIOPULMONARY BYPASS PUMP, HEART TRANSPLANT RECIPIENT, REMOVAL OF LEFT VENTRICULAR ASSIST DEVICE, REMOVAL OF AUTOMATED IMPLANTABLE CARDIOVERTER DEFIBRILLATOR;  Surgeon: Elder Willis MD;  [facility]       Physical Exam    No exam today.    RESULTS  Creatinine   Date Value Ref Range Status   06/23/2021 1.93 (H) 0.66 - 1.25 mg/dL Final     GFR Estimate   Date Value Ref Range Status   06/23/2021 37 (L) >60 mL/min/[1.73_m2] Final     Comment:     Non  GFR Calc  Starting 12/18/2018, serum creatinine based estimated GFR (eGFR) will be   calculated using the Chronic Kidney Disease Epidemiology Collaboration   (CKD-EPI) equation.       Hemoglobin A1C   Date Value Ref Range Status   06/23/2021 10.3 (H) 0 - 5.6 % Final     Comment:     Normal <5.7% Prediabetes 5.7-6.4%  Diabetes 6.5% or higher - adopted from ADA   consensus guidelines.       Potassium   Date Value Ref Range Status   06/23/2021 3.6 3.4 - 5.3 mmol/L Final     ALT   Date Value Ref Range Status   06/23/2021 26 0 - 70 U/L Final     AST   Date Value Ref Range Status   06/23/2021 22 0 - 45 U/L Final     TSH   Date Value Ref Range Status   03/03/2021 1.87 0.40 - 4.00 mU/L Final     T4 Free   Date Value Ref Range Status   03/03/2021 1.08 0.76 - 1.46 ng/dL Final       Cholesterol   Date Value Ref Range Status   06/23/2021 168 <200 mg/dL Final   10/29/2020 161 <200 mg/dL Final     HDL Cholesterol   Date Value Ref Range Status   06/23/2021 49 >39 mg/dL Final   10/29/2020 51 >39 mg/dL Final     LDL Cholesterol Calculated   Date Value Ref Range Status   06/23/2021 75 <100 mg/dL Final     Comment:     Desirable:       <100 mg/dl   10/29/2020 81 <100 mg/dL Final     Comment:     Desirable:       <100 mg/dl     Triglycerides   Date Value Ref Range Status   06/23/2021 220 (H) <150 mg/dL Final     Comment:     Borderline high:  150-199  mg/dl  High:             200-499 mg/dl  Very high:       >499 mg/dl     10/29/2020 144 <150 mg/dL Final     A1C   10.0 %  3/3/2021  A1C   6.2    10/29/2020  A1C   7.8    6/15/2020    ASSESSMENT/PLAN:      1.  TYPE 2 DIABETES MELLITUS: Uncontrolled type 2 diabetes mellitus.   I have no blood sugar data today.  Red has not been checking his blood sugar at home.  I asked him to check his fasting blood sugar each am and also check his blood sugar prelunch and predinner DAILY and take his insulin daily has prescribed.  I plan to call him in 4 weeks.  No change in insulin doses today.  Avoid use of Metformin and oral diabetic meds due to CKD and lower GFR.  Pt is unable to afford his DexcomG6 sensor supplies.  Medicare will now cover Freestyle Libre2 sensors- RX sent to patient's local pharmacy.  Pt was seen by Oph in Jan 2021 without retinopathy.  He reports mild tingling in his feet.  No foot ulcers at this time.  He is taking ASA daily.  Red received the COVID19 vaccines (Pfizer).    2.  CKD/ANDREW: Most recent creat was 2.05 with GFR 34 mL/min in July 2021.  Avoid use of Metformin.  Hold on starting SGLT2 at this time due to lower GFR.    3.  HX OF ISCHEMIC CARDIOMYOPATHY: S/P heart transplant on 5/18/2020 and followed closely by transplant staff here.     4.  FOLLOW UP : with me in 1 month with blood sugar data.    Total time spent reviewing chart note and labs today = 8 minutes.  Total time for video visit today = 16  minutes.  Total time for documentation today = 15 minutes.    TOTAL TIME FOR VIDEO VISIT TODAY=  39 minutes.    Jeannette Schafer PA-C

## 2021-09-02 ENCOUNTER — TELEPHONE (OUTPATIENT)
Dept: TRANSPLANT | Facility: CLINIC | Age: 61
End: 2021-09-02

## 2021-09-02 DIAGNOSIS — Z94.1 STATUS POST HEART TRANSPLANTATION (H): Primary | ICD-10-CM

## 2021-09-02 NOTE — LETTER
[facility] [phone] (Phone)  [phone] (Fax)       2021    Patient Name: Mariusz Sharp   :  1960   MRN: [id number]  ICD10:  z94.1 , z79.899    Subject: Request for Labs    Mariusz Sharp is a heart transplant patient currently being followed by the [facility].  We would like to request your assistance in obtaining the following laboratory tests which are part of our routine surveillance program for heart transplant recipients.  (Items needed are checked.)    Please fax the lab results as soon as they are available to:   Thoracic Transplant Department  Fax Number:  [phone]     Item Frequency   X CBC with platelets Once in 2021 and as needed with med changes.   X Basic metabolic panel (including:  BUN,   Serum Creatinine, Sodium, Potassium, Chloride,   CO2, Calcium, Magnesium, Phosphorus, and   Glucose) Once in 2021 and as needed with med changes.   X Everolimus and  Tacrolimus/Prograf level  (Should be mailed to the [facility] in the  provided to you by the patient.) Once in 2021 and as needed with med changes.     Thank you  for your continued support and the opportunity to collaborate in the care of this patient.  If you have any questions, please call the Thoracic Transplant Team at [phone] or [phone].    .

## 2021-09-02 NOTE — TELEPHONE ENCOUNTER
Pt called to remind him to have his labs drawn in Sept for CBC, BMP, Tac, Ever levels.  If Creat greater than 1.7, plan to change IMS to MMF and Evero.  Orders faxed to [facility] in [place].  Pt states understanding instructions and plan of care.

## 2021-09-17 ENCOUNTER — TELEPHONE (OUTPATIENT)
Dept: TRANSPLANT | Facility: CLINIC | Age: 61
End: 2021-09-17

## 2021-09-21 ENCOUNTER — TELEPHONE (OUTPATIENT)
Dept: TRANSPLANT | Facility: CLINIC | Age: 61
End: 2021-09-21

## 2021-09-21 DIAGNOSIS — Z94.1 STATUS POST HEART TRANSPLANTATION (H): Primary | ICD-10-CM

## 2021-09-21 RX ORDER — MYCOPHENOLATE MOFETIL 250 MG/1
1000 CAPSULE ORAL 2 TIMES DAILY
Qty: 240 CAPSULE | Refills: 11 | Status: SHIPPED | OUTPATIENT
Start: 2021-09-21 | End: 2022-10-20

## 2021-09-22 ENCOUNTER — TELEPHONE (OUTPATIENT)
Dept: TRANSPLANT | Facility: CLINIC | Age: 61
End: 2021-09-22

## 2021-09-22 DIAGNOSIS — Z94.1 STATUS POST HEART TRANSPLANTATION (H): Primary | ICD-10-CM

## 2021-09-22 NOTE — LETTER
[facility] [phone] (Phone)  [phone] (Fax)       2021    Patient Name: Mariusz Sharp   :  1960   MRN: [id number]  ICD10:  z94.1 , z79.899    Subject: Request for Labs    Mariusz Sharp is a heart transplant patient currently being followed by the [facility].  We would like to request your assistance in obtaining the following laboratory tests which are part of our routine surveillance program for heart transplant recipients.  (Items needed are checked.)    Please fax the lab results as soon as they are available to:   Thoracic Transplant Department  Fax Number:  [phone]     Item Frequency   X Basic metabolic panel (including:  BUN,   Serum Creatinine, Sodium, Potassium, Chloride,   CO2, Calcium, Magnesium, Phosphorus, and   Glucose) Once in 2021 and as needed with med changes.     Thank you  for your continued support and the opportunity to collaborate in the care of this patient.  If you have any questions, please call the Thoracic Transplant Team at [phone] or [phone].    .

## 2021-09-22 NOTE — TELEPHONE ENCOUNTER
Pt called with recent lab work results.  Per Dr. Ortiz at last visit:    If creatine is greater than 1.7 in 3 months- will stop Tacro and begin MMF 1000 BID and obtain Immuknow, RHC and Biopsy at 16 months visit.    Pt's Creat on recent lab work 2.57.    Pt called and given the following instructions:  Take last dose of Tacrolimus tonight.  Tomorrow morning start taking MMF 1000mg twice a day in place of Tacrolimus.  Continue to taking Everolimus twice daily as previously prescribed.   Please check a BMP in one week to check on Creat level.    Follow up visit with KIKA Benson with RHC/Biopsy on 10.26.    Also discussed need for pt to follow up with Dr. aCrlos and Dr. Gomes as he has no-showed for both follow ups with them.    Pt states understanding plan of care and instructions.

## 2021-09-30 DIAGNOSIS — Z11.59 ENCOUNTER FOR SCREENING FOR OTHER VIRAL DISEASES: ICD-10-CM

## 2021-10-01 ENCOUNTER — TELEPHONE (OUTPATIENT)
Dept: TRANSPLANT | Facility: CLINIC | Age: 61
End: 2021-10-01
Payer: COMMERCIAL

## 2021-10-01 NOTE — TELEPHONE ENCOUNTER
Called [facility] to request patient's most recent labs be faxed. Unable to reach lab personal today. Awaiting labs (w/Cr recheck).

## 2021-10-07 ENCOUNTER — TELEPHONE (OUTPATIENT)
Dept: TRANSPLANT | Facility: CLINIC | Age: 61
End: 2021-10-07

## 2021-10-07 DIAGNOSIS — Z94.1 STATUS POST HEART TRANSPLANTATION (H): Primary | ICD-10-CM

## 2021-10-07 RX ORDER — ROSUVASTATIN CALCIUM 10 MG/1
10 TABLET, COATED ORAL DAILY
Qty: 90 TABLET | Refills: 3 | Status: SHIPPED | OUTPATIENT
Start: 2021-10-07 | End: 2022-10-10

## 2021-10-07 RX ORDER — BLOOD SUGAR DIAGNOSTIC
STRIP MISCELLANEOUS
Qty: 400 STRIP | Refills: 3 | Status: SHIPPED | OUTPATIENT
Start: 2021-10-07 | End: 2022-11-30

## 2021-10-07 RX ORDER — POTASSIUM CHLORIDE 1500 MG/1
20 TABLET, EXTENDED RELEASE ORAL DAILY
Qty: 90 TABLET | Refills: 3 | Status: SHIPPED | OUTPATIENT
Start: 2021-10-07 | End: 2022-11-07

## 2021-10-10 ENCOUNTER — HEALTH MAINTENANCE LETTER (OUTPATIENT)
Age: 61
End: 2021-10-10

## 2021-10-21 ENCOUNTER — TELEPHONE (OUTPATIENT)
Dept: ENDOCRINOLOGY | Facility: CLINIC | Age: 61
End: 2021-10-21

## 2021-10-21 ENCOUNTER — TELEPHONE (OUTPATIENT)
Dept: TRANSPLANT | Facility: CLINIC | Age: 61
End: 2021-10-21

## 2021-10-21 DIAGNOSIS — Z94.1 STATUS POST HEART TRANSPLANTATION (H): Primary | ICD-10-CM

## 2021-10-21 RX ORDER — LIDOCAINE 40 MG/G
CREAM TOPICAL
Status: CANCELLED | OUTPATIENT
Start: 2021-10-21

## 2021-10-21 NOTE — TELEPHONE ENCOUNTER
Attempted to reach patient to schedule follow up in the Endocrinology Clinic.  Patient is not available at the time of call and request a call back another time. Patient due for follow up appointment. Letter also send.    Schedule with TATYANA Ley.

## 2021-10-25 ENCOUNTER — TELEPHONE (OUTPATIENT)
Dept: CARDIOLOGY | Facility: CLINIC | Age: 61
End: 2021-10-25

## 2021-10-25 ENCOUNTER — LAB (OUTPATIENT)
Dept: URGENT CARE | Facility: URGENT CARE | Age: 61
End: 2021-10-25
Attending: INTERNAL MEDICINE
Payer: COMMERCIAL

## 2021-10-25 DIAGNOSIS — Z11.59 ENCOUNTER FOR SCREENING FOR OTHER VIRAL DISEASES: ICD-10-CM

## 2021-10-25 LAB — SARS-COV-2 RNA RESP QL NAA+PROBE: NEGATIVE

## 2021-10-25 PROCEDURE — U0005 INFEC AGEN DETEC AMPLI PROBE: HCPCS

## 2021-10-25 PROCEDURE — U0003 INFECTIOUS AGENT DETECTION BY NUCLEIC ACID (DNA OR RNA); SEVERE ACUTE RESPIRATORY SYNDROME CORONAVIRUS 2 (SARS-COV-2) (CORONAVIRUS DISEASE [COVID-19]), AMPLIFIED PROBE TECHNIQUE, MAKING USE OF HIGH THROUGHPUT TECHNOLOGIES AS DESCRIBED BY CMS-2020-01-R: HCPCS

## 2021-10-26 ENCOUNTER — HOSPITAL ENCOUNTER (OUTPATIENT)
Facility: CLINIC | Age: 61
Discharge: HOME | End: 2021-10-26
Attending: INTERNAL MEDICINE | Admitting: INTERNAL MEDICINE
Payer: COMMERCIAL

## 2021-10-26 ENCOUNTER — LAB (OUTPATIENT)
Dept: LAB | Facility: CLINIC | Age: 61
End: 2021-10-26
Payer: COMMERCIAL

## 2021-10-26 ENCOUNTER — OFFICE VISIT (OUTPATIENT)
Dept: CARDIOLOGY | Facility: CLINIC | Age: 61
End: 2021-10-26
Attending: INTERNAL MEDICINE
Payer: COMMERCIAL

## 2021-10-26 ENCOUNTER — APPOINTMENT (OUTPATIENT)
Dept: MEDSURG UNIT | Facility: CLINIC | Age: 61
End: 2021-10-26
Attending: INTERNAL MEDICINE
Payer: COMMERCIAL

## 2021-10-26 VITALS
TEMPERATURE: 98.8 F | DIASTOLIC BLOOD PRESSURE: 99 MMHG | RESPIRATION RATE: 18 BRPM | SYSTOLIC BLOOD PRESSURE: 146 MMHG | OXYGEN SATURATION: 98 % | HEART RATE: 135 BPM

## 2021-10-26 VITALS
WEIGHT: 188 LBS | OXYGEN SATURATION: 96 % | DIASTOLIC BLOOD PRESSURE: 91 MMHG | HEART RATE: 114 BPM | BODY MASS INDEX: 32.1 KG/M2 | SYSTOLIC BLOOD PRESSURE: 125 MMHG | HEIGHT: 64 IN

## 2021-10-26 DIAGNOSIS — E83.42 HYPOMAGNESEMIA: ICD-10-CM

## 2021-10-26 DIAGNOSIS — Z13.29 THYROID DISORDER SCREENING: Primary | ICD-10-CM

## 2021-10-26 DIAGNOSIS — E11.65 TYPE 2 DIABETES MELLITUS WITH HYPERGLYCEMIA, WITH LONG-TERM CURRENT USE OF INSULIN (H): ICD-10-CM

## 2021-10-26 DIAGNOSIS — Z13.220 LIPID SCREENING: ICD-10-CM

## 2021-10-26 DIAGNOSIS — Z94.1 STATUS POST HEART TRANSPLANTATION (H): ICD-10-CM

## 2021-10-26 DIAGNOSIS — Z79.4 TYPE 2 DIABETES MELLITUS WITH HYPERGLYCEMIA, WITH LONG-TERM CURRENT USE OF INSULIN (H): ICD-10-CM

## 2021-10-26 LAB
ALBUMIN SERPL-MCNC: 3.9 G/DL (ref 3.4–5)
ALP SERPL-CCNC: 69 U/L (ref 40–150)
ALT SERPL W P-5'-P-CCNC: 34 U/L (ref 0–70)
ANION GAP SERPL CALCULATED.3IONS-SCNC: 8 MMOL/L (ref 3–14)
AST SERPL W P-5'-P-CCNC: 27 U/L (ref 0–45)
BASOPHILS # BLD AUTO: 0 10E3/UL (ref 0–0.2)
BASOPHILS NFR BLD AUTO: 1 %
BILIRUB SERPL-MCNC: 0.7 MG/DL (ref 0.2–1.3)
BUN SERPL-MCNC: 39 MG/DL (ref 7–30)
CALCIUM SERPL-MCNC: 9.5 MG/DL (ref 8.5–10.1)
CHLORIDE BLD-SCNC: 106 MMOL/L (ref 94–109)
CHOLEST SERPL-MCNC: 205 MG/DL
CO2 SERPL-SCNC: 26 MMOL/L (ref 20–32)
CREAT SERPL-MCNC: 2.04 MG/DL (ref 0.66–1.25)
EOSINOPHIL # BLD AUTO: 0.2 10E3/UL (ref 0–0.7)
EOSINOPHIL NFR BLD AUTO: 3 %
ERYTHROCYTE [DISTWIDTH] IN BLOOD BY AUTOMATED COUNT: 13.5 % (ref 10–15)
GFR SERPL CREATININE-BSD FRML MDRD: 35 ML/MIN/1.73M2
GLUCOSE BLD-MCNC: 248 MG/DL (ref 70–99)
HBA1C MFR BLD: 9.4 % (ref 0–5.6)
HCT VFR BLD AUTO: 35.2 % (ref 40–53)
HDLC SERPL-MCNC: 40 MG/DL
HGB BLD-MCNC: 11 G/DL (ref 13.3–17.7)
HGB BLD-MCNC: 11.5 G/DL (ref 13.3–17.7)
IMM GRANULOCYTES # BLD: 0 10E3/UL
IMM GRANULOCYTES NFR BLD: 0 %
LDLC SERPL CALC-MCNC: 105 MG/DL
LDLC SERPL CALC-MCNC: ABNORMAL MG/DL
LYMPHOCYTES # BLD AUTO: 0.8 10E3/UL (ref 0.8–5.3)
LYMPHOCYTES NFR BLD AUTO: 13 %
MAGNESIUM SERPL-MCNC: 2.5 MG/DL (ref 1.6–2.3)
MCH RBC QN AUTO: 26.5 PG (ref 26.5–33)
MCHC RBC AUTO-ENTMCNC: 32.7 G/DL (ref 31.5–36.5)
MCV RBC AUTO: 81 FL (ref 78–100)
MONOCYTES # BLD AUTO: 0.7 10E3/UL (ref 0–1.3)
MONOCYTES NFR BLD AUTO: 11 %
NEUTROPHILS # BLD AUTO: 4.5 10E3/UL (ref 1.6–8.3)
NEUTROPHILS NFR BLD AUTO: 72 %
NONHDLC SERPL-MCNC: 165 MG/DL
NRBC # BLD AUTO: 0 10E3/UL
NRBC BLD AUTO-RTO: 0 /100
OXYHGB MFR BLDV: 62 % (ref 92–100)
PHOSPHATE SERPL-MCNC: 3.1 MG/DL (ref 2.5–4.5)
PLATELET # BLD AUTO: 263 10E3/UL (ref 150–450)
POTASSIUM BLD-SCNC: 3.9 MMOL/L (ref 3.4–5.3)
PROT SERPL-MCNC: 7.7 G/DL (ref 6.8–8.8)
RBC # BLD AUTO: 4.34 10E6/UL (ref 4.4–5.9)
SODIUM SERPL-SCNC: 140 MMOL/L (ref 133–144)
TRIGL SERPL-MCNC: 451 MG/DL
WBC # BLD AUTO: 6.3 10E3/UL (ref 4–11)

## 2021-10-26 PROCEDURE — 82810 BLOOD GASES O2 SAT ONLY: CPT

## 2021-10-26 PROCEDURE — 83721 ASSAY OF BLOOD LIPOPROTEIN: CPT | Performed by: NURSE PRACTITIONER

## 2021-10-26 PROCEDURE — 83036 HEMOGLOBIN GLYCOSYLATED A1C: CPT | Performed by: PATHOLOGY

## 2021-10-26 PROCEDURE — 88346 IMFLUOR 1ST 1ANTB STAIN PX: CPT | Mod: TC | Performed by: INTERNAL MEDICINE

## 2021-10-26 PROCEDURE — 83735 ASSAY OF MAGNESIUM: CPT | Performed by: PATHOLOGY

## 2021-10-26 PROCEDURE — 80169 DRUG ASSAY EVEROLIMUS: CPT | Performed by: NURSE PRACTITIONER

## 2021-10-26 PROCEDURE — 99215 OFFICE O/P EST HI 40 MIN: CPT | Performed by: NURSE PRACTITIONER

## 2021-10-26 PROCEDURE — 36415 COLL VENOUS BLD VENIPUNCTURE: CPT | Performed by: PATHOLOGY

## 2021-10-26 PROCEDURE — 80061 LIPID PANEL: CPT | Performed by: PATHOLOGY

## 2021-10-26 PROCEDURE — 84100 ASSAY OF PHOSPHORUS: CPT | Performed by: PATHOLOGY

## 2021-10-26 PROCEDURE — 272N000001 HC OR GENERAL SUPPLY STERILE: Performed by: INTERNAL MEDICINE

## 2021-10-26 PROCEDURE — 80053 COMPREHEN METABOLIC PANEL: CPT | Performed by: PATHOLOGY

## 2021-10-26 PROCEDURE — G0463 HOSPITAL OUTPT CLINIC VISIT: HCPCS

## 2021-10-26 PROCEDURE — 86352 CELL FUNCTION ASSAY W/STIM: CPT | Performed by: NURSE PRACTITIONER

## 2021-10-26 PROCEDURE — 999N000132 HC STATISTIC PP CARE STAGE 1

## 2021-10-26 PROCEDURE — 250N000009 HC RX 250: Performed by: INTERNAL MEDICINE

## 2021-10-26 PROCEDURE — 85018 HEMOGLOBIN: CPT

## 2021-10-26 PROCEDURE — 93505 ENDOMYOCARDIAL BIOPSY: CPT | Performed by: INTERNAL MEDICINE

## 2021-10-26 PROCEDURE — C1894 INTRO/SHEATH, NON-LASER: HCPCS | Performed by: INTERNAL MEDICINE

## 2021-10-26 PROCEDURE — 999N000142 HC STATISTIC PROCEDURE PREP ONLY

## 2021-10-26 PROCEDURE — 84999 UNLISTED CHEMISTRY PROCEDURE: CPT | Performed by: NURSE PRACTITIONER

## 2021-10-26 PROCEDURE — 250N000009 HC RX 250: Performed by: NURSE PRACTITIONER

## 2021-10-26 PROCEDURE — 85025 COMPLETE CBC W/AUTO DIFF WBC: CPT | Performed by: PATHOLOGY

## 2021-10-26 RX ORDER — LIDOCAINE 40 MG/G
CREAM TOPICAL
Status: COMPLETED | OUTPATIENT
Start: 2021-10-26 | End: 2021-10-26

## 2021-10-26 RX ADMIN — LIDOCAINE: 40 CREAM TOPICAL at 12:30

## 2021-10-26 ASSESSMENT — PAIN SCALES - GENERAL: PAINLEVEL: NO PAIN (0)

## 2021-10-26 ASSESSMENT — MIFFLIN-ST. JEOR: SCORE: 1578.76

## 2021-10-26 NOTE — NURSING NOTE
Transplant Coordinator Note    Reason for visit: 16 month visit  Coordinator: Present   Caregiver:  none today    Health concerns addressed today:  1. Feeling a little weaker, less endurance.  2. Has lost some sense of taste- likely from Evero?  3. Creat 2.03  4.  Some constipation- not needing stool softener.    Immunosuppressants:  Evero 2/3  MMF 1000 BID    Routine screenings:    Derm: DUE  Dental: DUE  Colonoscopy: Due 2022  Breast/Prostate: PSA 9.64 follows with Dr. Gomes  Eye: Up to date  Flu/Pneumonia: Covid x2- needs booster, needs flu- will get in pharm today, needs Shringrix    Labs:       Additional Notes:         Labs reviewed with patient  Medication record reviewed and reconciled  Questions and concerns addressed  Pt verbalized an understanding of plan of care.     Patient Instructions  1. Decrease your Magnesium to once a day.  2. Angelika will call you with all remaining labs/testing results.  3. Return to clinic in 3 months.    Next transplant clinic appointment:  In 3 months for 20 month visit.  Next lab draw:   Coordinator will call with all pending results.     Please call transplant coordinator with any questions:    Angelika Muller RN BSN   Post Heart Transplant Nurse Coordinator  [facility]  Questions: [phone]

## 2021-10-26 NOTE — PROGRESS NOTES
D/I/A:  Patient is tolerating liquids and foods, ambulating, urinating, puncture sites are stable ( no bleeding and no hematoma) and patient has a .  A+O x4 and making needs known.  CCL access sites C/D/I; no bleeding or hematoma; CMS intact.  VSSA.   IV access removed.  Education completed and outlined in AVS or handout: medications reviewed with patient.  Questions answered prior to discharge.  Belongings returned to patient at discharge.    P: Discharged to self care.  Patient to follow up with appts as per discharge instruction.

## 2021-10-26 NOTE — NURSING NOTE
Chief Complaint   Patient presents with     Follow Up     [facility] return heart transplant     Vitals were taken and medications reconciled.    ALFONSO Adamson  10:56 AM

## 2021-10-26 NOTE — PATIENT INSTRUCTIONS
Patient Instructions  1. Decrease your Magnesium to once a day.  2. Angelika will call you with all remaining labs/testing results.  3. Return to clinic in 3 months.    Next transplant clinic appointment:  In 3 months for 20 month visit.  Next lab draw:   Coordinator will call with all pending results.     Please call transplant coordinator with any questions:    Angelika Muller RN BSN   Post Heart Transplant Nurse Coordinator  [facility]  Questions: [phone]

## 2021-10-26 NOTE — PROGRESS NOTES
Pt prepped for right heart cath.Consent signed and questions answered.LMX cream applied to cream.I went over discharge instructions since pt has had several of these procedures in the past and knows what to do after procedure.

## 2021-10-26 NOTE — PROGRESS NOTES
"ADULT HEART TRANSPLANT CLINIC  October 26, 2021    HPI:    \"Anju Sharp is a 61yr old male with a history of CAD (STEMI with ALYSSA to LAD 6/27/18), ICM (LVEF 20-25%) s/p HM3 LVAD (12/31/18), monomorphic VT (s/p ICD with shocks), LV thrombus, CKD, elevated PSA, pAF, HTN, HL, and DMII, now s/p OHT 5/18/20, who presents to clinic for routine follow up.     His post-transplant course was c/b NSVT (with no hemodynamic compromise), leukocytosis with C Acnes growing from his former LVAD site (thought to be a contaminant, but treated with IV vanco --> po doxy through 6/1/20, 14d course total), and in the setting of donor blood growing S anginosus and Veillonella (also thought to be a contaminant, but treated with Vanco/zosyn --> Vanco/Flagyl for a total of 7 days).  He also had hyperkalemia, which improved following kayexalate and stopping bactrim.  He ultimately discharged 5/29/20.     He was readmitted 8/31-9/24/20 with fever, cough, diarrhea, and syncope.  He was found to have a post-obstructive vs aspiration pneumonia, RUL/suprahilar mass, and narrowing of multiple RUL bronchi.  He was initially treated as a fungal infection as his fungitelle was +, but he did not clinically improve, nor did the size of the mass change.  Ultimately, tissue culture from the RUL mass showed abiotrophia defectiva (oral microbe), which was thought to be a contaminant, but did respond to antibiotics.  His MMF was decreased to 500mg twice daily, as ImmuKnow was noted to be low.    Rejection history:  none  AlloMap scores:  Last several have remained < 35  DSAs:  none  Coronary angio/Ischemic eval: Coronary angiogram 6/2021 showed normal coronary arteries with no angiographic evidence of obstruction.  Last RHC:  6/2021 showed mildly elevated biventricular filling pressures, with RA 12, mPA 29, PCW 22, and CI 3.33.  Echo/cMRI: TTE 6/2021 showed stable graft function, with LVEF 55-60% and normal RV size/function.    Since his last visit, he " "states that he feels okay overall.  He has not been walking as much, noting maybe 1.5 miles/day.  He attributes his decreased walking mostly to the weather.  He has been more tired, noting that he gets tired when climbing hills.  He does not believe that his endurance is declining.  His breathing remained stable, and he denies SOB, PND, and orthopnea.  His appetite has varied, but improved lately.  He is eating regularly, \"more than he should,\" and notes that his appetite seems more robust at night.  Since starting everolimus, he notes that his tastes have been off.  He denies nausea, vomiting, and diarrhea.  He has some constipation, which has been ongoing since transplant, and self-resolves.  His BMs are regular, but hard.  He does not bear down, but just notes that his BMs come out hard.  His weight had gotten down to 175# ~3 weeks ago, and he was not eating much.  His weight is now 180#.  He denies fluid retention.  His BPs remain stable, ranging 115-130/70-90s.  He has ongoing, intermittent chest pain, which he believes is musculoskeletal/in his ribs.  He has not taken anything for this pain, and notes that it also self resolves.  He has a chronic dry cough, usually in the evening.  He believes he is hydrating well.  He otherwise denies palpitations, dizziness, recent falls, headaches, acute vision changes, fevers, chills, cough, sore throat, and signs of bleeding.      Serostatus:  CMV D+/R-  EBV D-/R+  Toxo D-/R-      Patient Active Problem List    Diagnosis Date Noted     Status post coronary angiogram 06/23/2021     Priority: Medium     Elevated prostate specific antigen (PSA) 01/07/2021     Priority: Medium     Acute kidney failure, unspecified (H) 09/18/2020     Priority: Medium     Fever 08/31/2020     Priority: Medium     Lung mass 08/31/2020     Priority: Medium     Added automatically from request for surgery [id number]       Kidney stone 08/28/2020     Priority: Medium     Rhinovirus infection " 08/20/2020     Priority: Medium     Chronic prostatitis without hematuria 08/19/2020     Priority: Medium     Prophylactic antibiotic 08/19/2020     Priority: Medium     Pneumonia 08/18/2020     Priority: Medium     Heart replaced by transplant (H) 05/26/2020     Priority: Medium     Added automatically from request for surgery [id number]       Biventricular heart failure (H) 05/17/2020     Priority: Medium     Added automatically from request for surgery [id number]       Status post heart transplantation (H) 05/17/2020     Priority: Medium     Added automatically from request for surgery [id number]       Hydronephrosis 05/12/2020     Priority: Medium     Chronic systolic congestive heart failure (H) 02/13/2019     Priority: Medium     Added automatically from request for surgery [id number]       Weakness 01/11/2019     Priority: Medium     Type 2 diabetes mellitus with hyperglycemia, with long-term current use of insulin (H) 07/20/2018     Priority: Medium     Left ventricular apical thrombus following MI (H) 07/06/2018     Priority: Medium     Hematuria 07/04/2018     Priority: Medium     Long term current use of anticoagulant 07/03/2018     Priority: Medium     Hypotension, unspecified hypotension type 07/02/2018     Priority: Medium     Monomorphic ventricular tachycardia (H) 07/02/2018     Priority: Medium     Overview:   Added automatically from request for surgery [id number]       ASCVD (arteriosclerotic cardiovascular disease) 06/27/2018     Priority: Medium     CKD (chronic kidney disease) stage 3, GFR 30-59 ml/min (H) 06/27/2018     Priority: Medium     Dyslipidemia 06/27/2018     Priority: Medium     ST elevation myocardial infarction involving left anterior descending (LAD) coronary artery (H) 06/27/2018     Priority: Medium     Type 2 diabetes mellitus with hyperglycemia (H) 06/27/2018     Priority: Medium     ANDREW (acute kidney injury) (H) 10/06/2016     Priority: Medium     Ureteral obstruction 10/06/2016     Priority:  Medium     Unilateral inguinal hernia 09/09/2009     Priority: Medium     Overview:   IMO Update 10/11         PAST MEDICAL HISTORY:  Past Medical History:   Diagnosis Date     Acute kidney injury (H)      CKD (chronic kidney disease)      Coronary artery disease      Diabetes mellitus (H)      HTN (hypertension)      Hyperlipidemia      ICD (implantable cardioverter-defibrillator), single chamber- NOT dependent 7/17/2018     Ischemic cardiomyopathy      LV (left ventricular) mural thrombus      Paroxysmal atrial flutter (H)      RVF (right ventricular failure) (H)      Systolic heart failure (H)      Ventricular tachycardia (H)        CURRENT MEDICATIONS:  Prescription Medications as of 10/26/2021       Rx Number Disp Refills Start End Last Dispensed Date Next Fill Date Owning Pharmacy    Enduring Hydro PLUS test strip  400 strip 3 10/7/2021    [id number] [place], [place] - [address] [place] [address] AVE AT [facility] [address]    Sig: Check BG 3 times daily at meals and at bedtime.    Class: E-Prescribe    acetaminophen (TYLENOL) 325 MG tablet     5/29/2020        Sig: Take 2 tablets (650 mg) by mouth every 4 hours as needed for other (multimodal surgical pain management along with NSAIDS and opioid medication as indicated based on pain control and physical function.)    Class: OTC    Route: Oral    amLODIPine (NORVASC) 5 MG tablet  90 tablet 3 9/28/2020    [facility] [place] [id number] [address]     Sig: Take 1 tablet (5 mg) by mouth daily    Class: E-Prescribe    Route: Oral    BD SILVANA U/F 32G X 4 MM insulin pen needle  600 each 3 10/9/2020    [facility] [place] [address]    Sig: Use 6 times daily.    Class: E-Prescribe    calcium carbonate 600 mg-vitamin D 400 units (CALTRATE) 600-400 MG-UNIT per tablet     5/29/2020        Sig: Take 1 tablet by mouth 2 times daily (with meals)    Class: OTC    Route: Oral    Renewals     Renewal requests to  authorizing provider (Damari Ohara APRN CNP) <b>prohibited</b>          everolimus (ZORTRESS) 0.5 MG tablet  21 tablet 0 7/30/2021    [facility][id number] - [place], [place] - [address] AT [address]    Sig: Take 4 tablets (2 mg) by mouth every morning AND 3 tablets (1.5 mg) every evening.    Class: E-Prescribe    Route: Oral    Prior authorization: Closed    HUMALOG KWIKPEN 100 UNIT/ML soln  15 mL 4 8/10/2021    [facility] #[id number] - [place], [place] - [address] AT [address]    Sig: Inject 1 unit/5 gms CHO with meals and snacks, plus correction.Approx 55 units in 24 hrs.    Class: E-Prescribe    hydrALAZINE (APRESOLINE) 50 MG tablet  540 tablet 3 10/13/2020    [facility] [address]    Sig: Take 2 tablets (100 mg) by mouth 3 times daily    Class: E-Prescribe    Route: Oral    insulin glargine (LANTUS PEN) 100 UNIT/ML pen  15 mL 3 1/6/2021    [facility] [address]    Sig: Inject 28 units subcutaneous at .    Class: E-Prescribe    Notes to Pharmacy: If Lantus is not covered by insurance, may substitute Basaglar at same dose and frequency.      loperamide (IMODIUM) 2 MG capsule  60 capsule 0 9/24/2020    [facility] - [address]    Sig: Take 1 capsule (2 mg) by mouth 4 times daily as needed for diarrhea    Class: E-Prescribe    Route: Oral    magnesium oxide (MAG-OX) 400 (241.3 Mg) MG tablet  90 tablet 3 7/15/2020    [facility] [address]    Sig: Take 1 tablet (400 mg) by mouth 2 times daily    Class: E-Prescribe    Route: Oral    multivitamin w/minerals (THERA-VIT-M) tablet  90 tablet 3 9/24/2020    [address]    Sig: Take 1 tablet by mouth daily    Class: E-Prescribe    Route: Oral    mycophenolate (GENERIC EQUIVALENT) 250 MG  capsule  240 capsule 11 9/21/2021    [facility][id number] - [place], [place] - [address] AT [address]    Sig: Take 4 capsules (1,000 mg) by mouth 2 times daily    Class: E-Prescribe    Notes to Pharmacy: TXP DT 5/18/2020 (Heart) TXP Dischg DT 5/29/2020 DX Heart transplant Z94.1 [facility] [place]    Route: Oral    Prior authorization: Closed    pantoprazole (PROTONIX) 40 MG EC tablet  90 tablet 3 9/28/2020    [facility] - [place], [place] - [address]    Sig: Take 1 tablet (40 mg) by mouth every morning (before breakfast)    Class: E-Prescribe    Route: Oral    potassium chloride ER (KLOR-CON M) 20 MEQ CR tablet  90 tablet 3 10/7/2021    [id number] - [place], [place] - [address] AT [address]    Sig: Take 1 tablet (20 mEq) by mouth daily    Class: E-Prescribe    Route: Oral    rosuvastatin (CRESTOR) 10 MG tablet  90 tablet 3 10/7/2021    [facility] #[id number] - [place], [place] - [address] AT [address]    Sig: Take 1 tablet (10 mg) by mouth daily    Class: E-Prescribe    Route: Oral    torsemide (DEMADEX) 20 MG tablet  90 tablet 3 6/23/2021    [id number] - [place], [place] - [address] AT [address]    Sig: Take 1 tablet (20 mg) by mouth daily    Class: E-Prescribe    Route: Oral          ROS:   Constitutional: No fever, chills, or sweats.  Stable weight trend recently.   ENT: No visual disturbance, ear ache, epistaxis, sore throat.   Allergies/Immunologic: Negative.   Respiratory: As per HPI.   Cardiovascular: As per HPI.   GI: No nausea, vomiting, hematemesis, melena, or hematochezia.   : No urinary frequency, dysuria, or hematuria.   Integument: Negative.   Psychiatric: Negative.   Neuro: Negative.   Endocrinology: Negative.   Musculoskeletal: As per HPI.    Exam:  BP (!) 125/91 (BP Location: Right arm, Patient Position: Chair, Cuff Size: Adult Regular)   Pulse  "114   Ht 1.626 m (5' 4\")   Wt 85.3 kg (188 lb)   SpO2 96%   BMI 32.27 kg/m    In general, the patient is a pleasant male in no apparent distress.    HEENT: NC/AT. PERRLA. EOMI.  Sclerae white, not injected.    Neck:  No adenopathy, No thyromegaly.    COR: JVD at clavicle when sitting upright.  RRR.  Normal S1 S2 splits physiologically.  No murmur, rub click, or gallop.    Lungs:  CTA. No rhonchi.    Abdomen: soft, nontender, nondistended.  No organomegaly.  Extremities:  No clubbing, cyanosis, or LE edema.    Neuro: Alert & Oriented x 3, grossly non focal.  Integument: No open lesions, rashes, or jaundice.    Labs:  CBC RESULTS:   Lab Results   Component Value Date    WBC 6.3 10/26/2021    WBC 5.3 06/23/2021    RBC 4.34 (L) 10/26/2021    RBC 4.22 (L) 06/23/2021    HGB 11.5 (L) 10/26/2021    HGB 10.1 (L) 06/23/2021    HCT 35.2 (L) 10/26/2021    HCT 34.6 (L) 06/23/2021    MCV 81 10/26/2021    MCV 82 06/23/2021    MCH 26.5 10/26/2021    MCH 26.3 (L) 06/23/2021    MCHC 32.7 10/26/2021    MCHC 32.1 06/23/2021    RDW 13.5 10/26/2021    RDW 12.7 06/23/2021     10/26/2021     06/23/2021       BMP RESULTS:  Lab Results   Component Value Date     10/26/2021     06/23/2021    POTASSIUM 3.9 10/26/2021    POTASSIUM 3.6 06/23/2021    CHLORIDE 106 10/26/2021    CHLORIDE 105 06/23/2021    CO2 26 10/26/2021    CO2 24 06/23/2021    ANIONGAP 8 10/26/2021    ANIONGAP 7 06/23/2021     (H) 10/26/2021     (H) 06/23/2021    BUN 39 (H) 10/26/2021    BUN 35 (H) 06/23/2021    CR 2.04 (H) 10/26/2021    CR 1.93 (H) 06/23/2021    GFRESTIMATED 35 (L) 10/26/2021    GFRESTIMATED 37 (L) 06/23/2021    GFRESTBLACK 43 (L) 06/23/2021    ARLENE 9.5 10/26/2021    ARLENE 9.3 06/23/2021      LIPID RESULTS:  Lab Results   Component Value Date    CHOL 168 06/23/2021    HDL 49 06/23/2021    LDL 75 06/23/2021    TRIG 220 (H) 06/23/2021    NHDL 119 06/23/2021       IMMUNOSUPPRESSANT LEVELS  Lab Results   Component Value Date " "   TACROL 3.5 (L) 06/23/2021    DOSTAC Not Provided 06/23/2021       No components found for: CK  Lab Results   Component Value Date    MAG 2.5 (H) 10/26/2021    MAG 1.9 06/23/2021     Lab Results   Component Value Date    A1C 10.3 (H) 06/23/2021     Lab Results   Component Value Date    PHOS 3.1 10/26/2021    PHOS 3.4 06/23/2021     Lab Results   Component Value Date    NTBNPI 1,673 (H) 08/18/2020     Lab Results   Component Value Date    SAITESTMET SA [facility] 06/23/2021    SAICELL Class I 06/23/2021    [id number] Cw:15 06/23/2021    [id number] Cw:18 06/23/2021    SAIREPCOM  06/23/2021      Test performed by modified procedure. Serum heat inactivated and tested   by a modified (Severance) protocol including fetal calf serum addition.   High-risk, mfi >3,000. Mod-risk, mfi 500-3,000.       Lab Results   Component Value Date    SAIITESTME [facility] 06/23/2021    SAIICELL Class II 06/23/2021    [id number] None 06/23/2021    [id number] None 06/23/2021    SAIIREPCOM  06/23/2021      Test performed by modified procedure. Serum heat inactivated and tested   by a modified (Severance) protocol including fetal calf serum addition.   High-risk, mfi >3,000. Mod-risk, mfi 500-3,000.       Lab Results   Component Value Date    CSPEC Plasma 06/23/2021       Assessment and Plan:  . \"Red\"Aron is a 61yr old male with a history of CAD (STEMI with ALYSSA to LAD 6/27/18), ICM (LVEF 20-25%) s/p HM3 LVAD (12/31/18), monomorphic VT (s/p ICD with shocks), LV thrombus, CKD, elevated PSA, pAF, HTN, HL, and DMII, now s/p OHT 5/18/20, who presents to clinic for routine follow up.     Labs today show stable electrolytes, renal function, liver function, and blood counts.  AlloMap and ImmuKnow levels are in process.    He will have an RHC/biopsy following today's appointment.    Mr. Sharp appears well today.  His BPs remained stable.  His recent weight trend has been stable, and he appears euvolemic today.    We will add a fasting lipid profile and HgbA1c to " labs today.    As his magnesium level remains on the higher side, advised that he decrease magnesium to once daily.    Encouraged him to continue to get regular aerobic activity, and to work on heart-healthy dieting.    We will plan for him to follow-up in clinic per protocol, or sooner should new concerns arise.      ICM, s/p OHT 5/18/20  His post-transplant course was c/b NSVT (with no hemodynamic compromise), leukocytosis with C Acnes growing from his former LVAD site (thought to be a contaminant, but treated with IV vanco --> po doxy through 6/1/20, 14d course total), and in the setting of donor blood growing S anginosus and Veillonella (also thought to be a contaminant, but treated with Vanco/zosyn --> Vanco/Flagyl for a total of 7 days).  He also had hyperkalemia, which improved following kayexalate and stopping bactrim.  He ultimately discharged 5/29/20.     He was readmitted 8/31-9/24/20 with fever, cough, diarrhea, and syncope.  He was found to have a post-obstructive vs aspiration pneumonia, RUL/suprahilar mass, and narrowing of multiple RUL bronchi.  He was initially treated as a fungal infection as his fungitelle was +, but he did not clinically improve, nor did the size of the mass change.  Ultimately, tissue culture from the RUL mass showed abiotrophia defectiva (oral microbe), which was thought to be a contaminant, but did respond to antibiotics.  His MMF was decreased to 500mg twice daily, as ImmuKnow was noted to be low.    Rejection history:  none  AlloMap scores:  Last several have remained < 35  DSAs:  none  Coronary angio/Ischemic eval: Coronary angiogram 6/2021 showed normal coronary arteries with no angiographic evidence of obstruction.  Last RHC:  6/2021 showed mildly elevated biventricular filling pressures, with RA 12, mPA 29, PCW 22, and CI 3.33.  Echo/cMRI: TTE 6/2021 showed stable graft function, with LVEF 55-60% and normal RV size/function.    Serostatus:  - CMV D+/R-  - EBV D-/R+  -  Toxo D-/R-     Immunosuppression:  - MMF 1000mg BID  - everolimus, goal level 6-8.  Needs a level drawn.     PPx:  - CAV:  Continue rosuvastatin 10mg daily.  - GI:  Pantoprazole 40mg daily  - Osteoporosis:  Calcium/vitamin d supplements     Graft function:  - BPs:  Controlled, continue amlodipine 5mg daily and hydralazine 100mg TID  - fluid status:  Euvolemic, continue torsemide 20mg daily     Health maintenance:  -- completed COVID shots, needs booster  -- needs flu shot  -- needs shingrix   -- pneumonia shots unclear -- advised that he follow-up with PCP  -- derm exam overdue, never seen  -- eye exam current, gets yearly exams  -- dental exam overdue, prior to heart attack  -- last colo 12/2018, due now per his report         Post-obstructive versus aspiration pneumonia  RUL/suprahilar mass   +abiotrophia defectiva  He was readmitted 8/31/20-9/24/20 with fever, cough, diarrhea, and syncope.  He was found to have a post-obstructive vs aspiration pneumonia, RUL/suprahilar mass, and narrowing of multiple RUL bronchi.  He was initially treated as a fungal infection as his fungitelle was +, but he did not clinically improve, nor did the size of the mass change.  Ultimately, tissue culture from the RUL mass showed abiotrophia defectiva (oral microbe), which was thought to be a contaminant, but did respond to antibiotics.  His MMF was decreased to 500mg twice daily, as ImmuKnow was noted to be low.  He completed 4 weeks of antibiotic therapy 10/11/20, and follow up chest CTs have shown improvement in the RUL infiltrate.     DMII  BGs slightly elevated, following with endo.  Adding HgbA1c today.     Elevated PSA  Prostate MRI 5/2020 showed signs of BPH, he follows with urology.     Possible chronic prostatits  Treated with antibiotics for > 1 month between 8/2020-10/2020.    The above was reviewed with Mr. Sharp, who verbalized understanding and will call with further questions/concerns.    45 minutes spent with patient,  along with preparing for visit, reviewing follow up, and completing documentation.      Karen Benson, LEX, FNP-BC, CHFN  Advanced Heart Failure Nurse Practitioner  [facility]  Patient Care Team:  Milad Fry MD as PCP - General (Family Practice)  Chavez, Antonia Puente, APRN CNP as Nurse Practitioner (Cardiology)  Jenni Ortiz MD as MD (Cardiology)  Megan Ibarra MD as MD (Urology)  Nina Gutierrez RN as Specialty Care Coordinator (Urology)  Angelika Muller, RN as Transplant Coordinator (Cardiology)  Xavi Mckeon [facility] as Pharmacist (Pharmacist)  Jeannette Schafer PA-C as Physician Assistant (INTERNAL MEDICINE - ENDOCRINOLOGY, DIABETES & METABOLISM)  Tavares Rodriguez MD as Assigned Infectious Disease Provider  Jenni Ortiz MD as Assigned Heart and Vascular Provider  Jeannette Schafer PA-C as Assigned Endocrinology Provider  Donyn Gomes MD as Assigned Cancer Care Provider  Kenton Carlos MD as Assigned Surgical Provider  SELF, REFERRED

## 2021-10-26 NOTE — LETTER
"10/26/2021      RE: Mariusz Sharp  [address] [facility] [address]       Dear Colleague,    Thank you for the opportunity to participate in the care of your patient, Mariusz Sharp, at the [facility]. Please see a copy of my visit note below.    ADULT HEART TRANSPLANT CLINIC  October 26, 2021    HPI:    \"Red\"Aron is a 61yr old male with a history of CAD (STEMI with ALYSSA to LAD 6/27/18), ICM (LVEF 20-25%) s/p HM3 LVAD (12/31/18), monomorphic VT (s/p ICD with shocks), LV thrombus, CKD, elevated PSA, pAF, HTN, HL, and DMII, now s/p OHT 5/18/20, who presents to clinic for routine follow up.     His post-transplant course was c/b NSVT (with no hemodynamic compromise), leukocytosis with C Acnes growing from his former LVAD site (thought to be a contaminant, but treated with IV vanco --> po doxy through 6/1/20, 14d course total), and in the setting of donor blood growing S anginosus and Veillonella (also thought to be a contaminant, but treated with Vanco/zosyn --> Vanco/Flagyl for a total of 7 days).  He also had hyperkalemia, which improved following kayexalate and stopping bactrim.  He ultimately discharged 5/29/20.     He was readmitted 8/31-9/24/20 with fever, cough, diarrhea, and syncope.  He was found to have a post-obstructive vs aspiration pneumonia, RUL/suprahilar mass, and narrowing of multiple RUL bronchi.  He was initially treated as a fungal infection as his fungitelle was +, but he did not clinically improve, nor did the size of the mass change.  Ultimately, tissue culture from the RUL mass showed abiotrophia defectiva (oral microbe), which was thought to be a contaminant, but did respond to antibiotics.  His MMF was decreased to 500mg twice daily, as ImmuKnow was noted to be low.    Rejection history:  none  AlloMap scores:  Last several have remained < 35  DSAs:  none  Coronary angio/Ischemic eval: Coronary " "angiogram 6/2021 showed normal coronary arteries with no angiographic evidence of obstruction.  Last RHC:  6/2021 showed mildly elevated biventricular filling pressures, with RA 12, mPA 29, PCW 22, and CI 3.33.  Echo/cMRI: TTE 6/2021 showed stable graft function, with LVEF 55-60% and normal RV size/function.    Since his last visit, he states that he feels okay overall.  He has not been walking as much, noting maybe 1.5 miles/day.  He attributes his decreased walking mostly to the weather.  He has been more tired, noting that he gets tired when climbing hills.  He does not believe that his endurance is declining.  His breathing remained stable, and he denies SOB, PND, and orthopnea.  His appetite has varied, but improved lately.  He is eating regularly, \"more than he should,\" and notes that his appetite seems more robust at night.  Since starting everolimus, he notes that his tastes have been off.  He denies nausea, vomiting, and diarrhea.  He has some constipation, which has been ongoing since transplant, and self-resolves.  His BMs are regular, but hard.  He does not bear down, but just notes that his BMs come out hard.  His weight had gotten down to 175# ~3 weeks ago, and he was not eating much.  His weight is now 180#.  He denies fluid retention.  His BPs remain stable, ranging 115-130/70-90s.  He has ongoing, intermittent chest pain, which he believes is musculoskeletal/in his ribs.  He has not taken anything for this pain, and notes that it also self resolves.  He has a chronic dry cough, usually in the evening.  He believes he is hydrating well.  He otherwise denies palpitations, dizziness, recent falls, headaches, acute vision changes, fevers, chills, cough, sore throat, and signs of bleeding.      Serostatus:  CMV D+/R-  EBV D-/R+  Toxo D-/R-      Patient Active Problem List    Diagnosis Date Noted     Status post coronary angiogram 06/23/2021     Priority: Medium     Elevated prostate specific antigen " (PSA) 01/07/2021     Priority: Medium     Acute kidney failure, unspecified (H) 09/18/2020     Priority: Medium     Fever 08/31/2020     Priority: Medium     Lung mass 08/31/2020     Priority: Medium     Added automatically from request for surgery [id number]       Kidney stone 08/28/2020     Priority: Medium     Rhinovirus infection 08/20/2020     Priority: Medium     Chronic prostatitis without hematuria 08/19/2020     Priority: Medium     Prophylactic antibiotic 08/19/2020     Priority: Medium     Pneumonia 08/18/2020     Priority: Medium     Heart replaced by transplant (H) 05/26/2020     Priority: Medium     Added automatically from request for surgery [id number]       Biventricular heart failure (H) 05/17/2020     Priority: Medium     Added automatically from request for surgery [id number]       Status post heart transplantation (H) 05/17/2020     Priority: Medium     Added automatically from request for surgery [id number]       Hydronephrosis 05/12/2020     Priority: Medium     Chronic systolic congestive heart failure (H) 02/13/2019     Priority: Medium     Added automatically from request for surgery [id number]       Weakness 01/11/2019     Priority: Medium     Type 2 diabetes mellitus with hyperglycemia, with long-term current use of insulin (H) 07/20/2018     Priority: Medium     Left ventricular apical thrombus following MI (H) 07/06/2018     Priority: Medium     Hematuria 07/04/2018     Priority: Medium     Long term current use of anticoagulant 07/03/2018     Priority: Medium     Hypotension, unspecified hypotension type 07/02/2018     Priority: Medium     Monomorphic ventricular tachycardia (H) 07/02/2018     Priority: Medium     Overview:   Added automatically from request for surgery [id number]       ASCVD (arteriosclerotic cardiovascular disease) 06/27/2018     Priority: Medium     CKD (chronic kidney disease) stage 3, GFR 30-59 ml/min (H) 06/27/2018     Priority: Medium     Dyslipidemia 06/27/2018     Priority:  Medium     ST elevation myocardial infarction involving left anterior descending (LAD) coronary artery (H) 06/27/2018     Priority: Medium     Type 2 diabetes mellitus with hyperglycemia (H) 06/27/2018     Priority: Medium     ANDREW (acute kidney injury) (H) 10/06/2016     Priority: Medium     Ureteral obstruction 10/06/2016     Priority: Medium     Unilateral inguinal hernia 09/09/2009     Priority: Medium     Overview:   IMO Update 10/11         PAST MEDICAL HISTORY:  Past Medical History:   Diagnosis Date     Acute kidney injury (H)      CKD (chronic kidney disease)      Coronary artery disease      Diabetes mellitus (H)      HTN (hypertension)      Hyperlipidemia      ICD (implantable cardioverter-defibrillator), single chamber- NOT dependent 7/17/2018     Ischemic cardiomyopathy      LV (left ventricular) mural thrombus      Paroxysmal atrial flutter (H)      RVF (right ventricular failure) (H)      Systolic heart failure (H)      Ventricular tachycardia (H)        CURRENT MEDICATIONS:  Prescription Medications as of 10/26/2021       Rx Number Disp Refills Start End Last Dispensed Date Next Fill Date Owning Pharmacy    [employer] [place] PLUS test strip  400 strip 3 10/7/2021    [facility] #[id number] [place], [place] - [address] AT [facility]    Sig: Check BG 3 times daily at meals and at bedtime.    Class: E-Prescribe    acetaminophen (TYLENOL) 325 MG tablet     5/29/2020        Sig: Take 2 tablets (650 mg) by mouth every 4 hours as needed for other (multimodal surgical pain management along with NSAIDS and opioid medication as indicated based on pain control and physical function.)    Class: OTC    Route: Oral    amLODIPine (NORVASC) 5 MG tablet  90 tablet 3 9/28/2020    [facility] - [place], [place] - [address]     Sig: Take 1 tablet (5 mg) by mouth daily    Class: E-Prescribe    Route: Oral    BD SILVANA U/F 32G X 4 MM insulin pen needle  600 each 3 10/9/2020    [facility]    Sig: Use 6 times daily.    Class: E-Prescribe    calcium carbonate 600 mg-vitamin D 400 units (CALTRATE) 600-400 MG-UNIT per tablet     5/29/2020        Sig: Take 1 tablet by mouth 2 times daily (with meals)    Class: OTC    Route: Oral    Renewals     Renewal requests to authorizing provider (Damari Ohara APRN CNP) <b>prohibited</b>          everolimus (ZORTRESS) 0.5 MG tablet  21 tablet 0 7/30/2021    [facility] [address] [place] [address] AVE AT [address]    Sig: Take 4 tablets (2 mg) by mouth every morning AND 3 tablets (1.5 mg) every evening.    Class: E-Prescribe    Route: Oral    Prior authorization: Closed    HUMALOG KWIKPEN 100 UNIT/ML soln  15 mL 4 8/10/2021    [facility] [address] [place] [address] AT [address]    Sig: Inject 1 unit/5 gms CHO with meals and snacks, plus correction.Approx 55 units in 24 hrs.    Class: E-Prescribe    hydrALAZINE (APRESOLINE) 50 MG tablet  540 tablet 3 10/13/2020    [facility]     Sig: Take 2 tablets (100 mg) by mouth 3 times daily    Class: E-Prescribe    Route: Oral    insulin glargine (LANTUS PEN) 100 UNIT/ML pen  15 mL 3 1/6/2021    [facility] [place] [address]    Sig: Inject 28 units subcutaneous at .    Class: E-Prescribe    Notes to Pharmacy: If Lantus is not covered by insurance, may substitute Basaglar at same dose and frequency.      loperamide (IMODIUM) 2 MG capsule  60 capsule 0 9/24/2020    [facility] [place], [place] - [address] [place] [address] SE    Sig: Take 1 capsule (2 mg) by mouth 4 times daily as needed for diarrhea    Class: E-Prescribe    Route: Oral    magnesium oxide (MAG-OX) 400 (241.3 Mg) MG tablet  90 tablet 3 7/15/2020    [facility] [address]    Sig: Take 1 tablet  (400 mg) by mouth 2 times daily    Class: E-Prescribe    Route: Oral    multivitamin w/minerals (THERA-VIT-M) tablet  90 tablet 3 9/24/2020    [facility] - [place], [place] - [address]    Sig: Take 1 tablet by mouth daily    Class: E-Prescribe    Route: Oral    mycophenolate (GENERIC EQUIVALENT) 250 MG capsule  240 capsule 11 9/21/2021    [facility] #[id number] [facility] [address] AT [address]    Sig: Take 4 capsules (1,000 mg) by mouth 2 times daily    Class: E-Prescribe    Notes to Pharmacy: TXP DT 5/18/2020 (Heart) TXP Dischg DT 5/29/2020 DX Heart transplant Z94.1 TX Center [facility] ([place], [place])    Route: Oral    Prior authorization: Closed    pantoprazole (PROTONIX) 40 MG EC tablet  90 tablet 3 9/28/2020    [facility] - [place], [place] - [address] [place] [address]    Sig: Take 1 tablet (40 mg) by mouth every morning (before breakfast)    Class: E-Prescribe    Route: Oral    potassium chloride ER (KLOR-CON M) 20 MEQ CR tablet  90 tablet 3 10/7/2021    [facility] [address] [place] [address] AVE AT [address]    Sig: Take 1 tablet (20 mEq) by mouth daily    Class: E-Prescribe    Route: Oral    rosuvastatin (CRESTOR) 10 MG tablet  90 tablet 3 10/7/2021    [facility] [address] [place] [address] AVE AT [address]    Sig: Take 1 tablet (10 mg) by mouth daily    Class: E-Prescribe    Route: Oral    torsemide (DEMADEX) 20 MG tablet  90 tablet 3 6/23/2021    [facility] [address] [place] [address] [address] [place] [address] AVE AT [address]    Sig: Take 1 tablet (20 mg) by mouth daily    Class: E-Prescribe    Route: Oral          ROS:   Constitutional: No fever, chills, or sweats.  Stable weight trend recently.   ENT: No visual disturbance, ear ache, epistaxis, sore throat.   Allergies/Immunologic: Negative.   Respiratory: As per HPI.   Cardiovascular: As per HPI.   GI: No nausea,  "vomiting, hematemesis, melena, or hematochezia.   : No urinary frequency, dysuria, or hematuria.   Integument: Negative.   Psychiatric: Negative.   Neuro: Negative.   Endocrinology: Negative.   Musculoskeletal: As per HPI.    Exam:  BP (!) 125/91 (BP Location: Right arm, Patient Position: Chair, Cuff Size: Adult Regular)   Pulse 114   Ht 1.626 m (5' 4\")   Wt 85.3 kg (188 lb)   SpO2 96%   BMI 32.27 kg/m    In general, the patient is a pleasant male in no apparent distress.    HEENT: NC/AT. PERRLA. EOMI.  Sclerae white, not injected.    Neck:  No adenopathy, No thyromegaly.    COR: JVD at clavicle when sitting upright.  RRR.  Normal S1 S2 splits physiologically.  No murmur, rub click, or gallop.    Lungs:  CTA. No rhonchi.    Abdomen: soft, nontender, nondistended.  No organomegaly.  Extremities:  No clubbing, cyanosis, or LE edema.    Neuro: Alert & Oriented x 3, grossly non focal.  Integument: No open lesions, rashes, or jaundice.    Labs:  CBC RESULTS:   Lab Results   Component Value Date    WBC 6.3 10/26/2021    WBC 5.3 06/23/2021    RBC 4.34 (L) 10/26/2021    RBC 4.22 (L) 06/23/2021    HGB 11.5 (L) 10/26/2021    HGB 10.1 (L) 06/23/2021    HCT 35.2 (L) 10/26/2021    HCT 34.6 (L) 06/23/2021    MCV 81 10/26/2021    MCV 82 06/23/2021    MCH 26.5 10/26/2021    MCH 26.3 (L) 06/23/2021    MCHC 32.7 10/26/2021    MCHC 32.1 06/23/2021    RDW 13.5 10/26/2021    RDW 12.7 06/23/2021     10/26/2021     06/23/2021       BMP RESULTS:  Lab Results   Component Value Date     10/26/2021     06/23/2021    POTASSIUM 3.9 10/26/2021    POTASSIUM 3.6 06/23/2021    CHLORIDE 106 10/26/2021    CHLORIDE 105 06/23/2021    CO2 26 10/26/2021    CO2 24 06/23/2021    ANIONGAP 8 10/26/2021    ANIONGAP 7 06/23/2021     (H) 10/26/2021     (H) 06/23/2021    BUN 39 (H) 10/26/2021    BUN 35 (H) 06/23/2021    CR 2.04 (H) 10/26/2021    CR 1.93 (H) 06/23/2021    GFRESTIMATED 35 (L) 10/26/2021    " "GFRESTIMATED 37 (L) 06/23/2021    GFRESTBLACK 43 (L) 06/23/2021    ARLENE 9.5 10/26/2021    ARLENE 9.3 06/23/2021      LIPID RESULTS:  Lab Results   Component Value Date    CHOL 168 06/23/2021    HDL 49 06/23/2021    LDL 75 06/23/2021    TRIG 220 (H) 06/23/2021    NHDL 119 06/23/2021       IMMUNOSUPPRESSANT LEVELS  Lab Results   Component Value Date    TACROL 3.5 (L) 06/23/2021    DOSTAC Not Provided 06/23/2021       No components found for: CK  Lab Results   Component Value Date    MAG 2.5 (H) 10/26/2021    MAG 1.9 06/23/2021     Lab Results   Component Value Date    A1C 10.3 (H) 06/23/2021     Lab Results   Component Value Date    PHOS 3.1 10/26/2021    PHOS 3.4 06/23/2021     Lab Results   Component Value Date    NTBNPI 1,673 (H) 08/18/2020     Lab Results   Component Value Date    SAITESTMET [facility] 06/23/2021    SAICELL Class I 06/23/2021    [id number] Cw:15 06/23/2021    [id number] Cw:18 06/23/2021    SAIREPCOM  06/23/2021      Test performed by modified procedure. Serum heat inactivated and tested   by a modified ([place]) protocol including fetal calf serum addition.   High-risk, mfi >3,000. Mod-risk, mfi 500-3,000.       Lab Results   Component Value Date    SAIITESTME [facility] 06/23/2021    SAIICELL Class II 06/23/2021    [id number] None 06/23/2021    [id number] None 06/23/2021    SAIIREPCOM  06/23/2021      Test performed by modified procedure. Serum heat inactivated and tested   by a modified ([place]) protocol including fetal calf serum addition.   High-risk, mfi >3,000. Mod-risk, mfi 500-3,000.       Lab Results   Component Value Date    CSPEC Plasma 06/23/2021       Assessment and Plan:   \"Red\"Aron is a 61yr old male with a history of CAD (STEMI with ALYSSA to LAD 6/27/18), ICM (LVEF 20-25%) s/p HM3 LVAD (12/31/18), monomorphic VT (s/p ICD with shocks), LV thrombus, CKD, elevated PSA, pAF, HTN, HL, and DMII, now s/p OHT 5/18/20, who presents to clinic for routine follow up.     Labs today show stable " electrolytes, renal function, liver function, and blood counts.  AlloMap and ImmuKnow levels are in process.    He will have an RHC/biopsy following today's appointment.    Mr. Sharp appears well today.  His BPs remained stable.  His recent weight trend has been stable, and he appears euvolemic today.    We will add a fasting lipid profile and HgbA1c to labs today.    As his magnesium level remains on the higher side, advised that he decrease magnesium to once daily.    Encouraged him to continue to get regular aerobic activity, and to work on heart-healthy dieting.    We will plan for him to follow-up in clinic per protocol, or sooner should new concerns arise.      ICM, s/p OHT 5/18/20  His post-transplant course was c/b NSVT (with no hemodynamic compromise), leukocytosis with C Acnes growing from his former LVAD site (thought to be a contaminant, but treated with IV vanco --> po doxy through 6/1/20, 14d course total), and in the setting of donor blood growing S anginosus and Veillonella (also thought to be a contaminant, but treated with Vanco/zosyn --> Vanco/Flagyl for a total of 7 days).  He also had hyperkalemia, which improved following kayexalate and stopping bactrim.  He ultimately discharged 5/29/20.     He was readmitted 8/31-9/24/20 with fever, cough, diarrhea, and syncope.  He was found to have a post-obstructive vs aspiration pneumonia, RUL/suprahilar mass, and narrowing of multiple RUL bronchi.  He was initially treated as a fungal infection as his fungitelle was +, but he did not clinically improve, nor did the size of the mass change.  Ultimately, tissue culture from the RUL mass showed abiotrophia defectiva (oral microbe), which was thought to be a contaminant, but did respond to antibiotics.  His MMF was decreased to 500mg twice daily, as ImmuKnow was noted to be low.    Rejection history:  none  AlloMap scores:  Last several have remained < 35  DSAs:  none  Coronary angio/Ischemic eval: Coronary  angiogram 6/2021 showed normal coronary arteries with no angiographic evidence of obstruction.  Last RHC:  6/2021 showed mildly elevated biventricular filling pressures, with RA 12, mPA 29, PCW 22, and CI 3.33.  Echo/cMRI: TTE 6/2021 showed stable graft function, with LVEF 55-60% and normal RV size/function.    Serostatus:  - CMV D+/R-  - EBV D-/R+  - Toxo D-/R-     Immunosuppression:  - MMF 1000mg BID  - everolimus, goal level 6-8.  Needs a level drawn.     PPx:  - CAV:  Continue rosuvastatin 10mg daily.  - GI:  Pantoprazole 40mg daily  - Osteoporosis:  Calcium/vitamin d supplements     Graft function:  - BPs:  Controlled, continue amlodipine 5mg daily and hydralazine 100mg TID  - fluid status:  Euvolemic, continue torsemide 20mg daily     Health maintenance:  -- completed COVID shots, needs booster  -- needs flu shot  -- needs shingrix   -- pneumonia shots unclear -- advised that he follow-up with PCP  -- derm exam overdue, never seen  -- eye exam current, gets yearly exams  -- dental exam overdue, prior to heart attack  -- last colo 12/2018, due now per his report         Post-obstructive versus aspiration pneumonia  RUL/suprahilar mass   +abiotrophia defectiva  He was readmitted 8/31/20-9/24/20 with fever, cough, diarrhea, and syncope.  He was found to have a post-obstructive vs aspiration pneumonia, RUL/suprahilar mass, and narrowing of multiple RUL bronchi.  He was initially treated as a fungal infection as his fungitelle was +, but he did not clinically improve, nor did the size of the mass change.  Ultimately, tissue culture from the RUL mass showed abiotrophia defectiva (oral microbe), which was thought to be a contaminant, but did respond to antibiotics.  His MMF was decreased to 500mg twice daily, as ImmuKnow was noted to be low.  He completed 4 weeks of antibiotic therapy 10/11/20, and follow up chest CTs have shown improvement in the RUL infiltrate.     DMII  BGs slightly elevated, following with endo.   Adding HgbA1c today.     Elevated PSA  Prostate MRI 5/2020 showed signs of BPH, he follows with urology.     Possible chronic prostatits  Treated with antibiotics for > 1 month between 8/2020-10/2020.    The above was reviewed with Mr. Sharp, who verbalized understanding and will call with further questions/concerns.    45 minutes spent with patient, along with preparing for visit, reviewing follow up, and completing documentation.      Karen Benson, LEX, FNP-BC, CHFN  Advanced Heart Failure Nurse Practitioner  [facility]  Patient Care Team:  Milad Fry MD as PCP - General (Family Practice)  Antonia Byrne, CARMEN CNP as Nurse Practitioner (Cardiology)  Jenni Ortiz MD as MD (Cardiology)  Megan Ibarra MD as MD (Urology)  Nina Gutierrez, RN as Specialty Care Coordinator (Urology)  Angelika Muller, RN as Transplant Coordinator (Cardiology)  Xavi Mckeon [facility] as Pharmacist (Pharmacist)  Jeannette Schafer PA-C as Physician Assistant (INTERNAL MEDICINE - ENDOCRINOLOGY, DIABETES & METABOLISM)  Tavares Rodriguez MD as Assigned Infectious Disease Provider  Jenni Ortiz MD as Assigned Heart and Vascular Provider  Jeannette Schafer PA-C as Assigned Endocrinology Provider  Donny Gomes MD as Assigned Cancer Care Provider  Kenton Carlos MD as Assigned Surgical Provider

## 2021-10-26 NOTE — DISCHARGE INSTRUCTIONS
[facility]                        Interventional Cardiology  Discharge Instructions   Post Right Heart Cath      AFTER YOU GO HOME:    DO drink plenty of fluids    DO resume your regular diet and medications unless otherwise instructed by your Primary Physician    Do Not scrub the procedure site vigorously    No lotion or powder to the puncture site for 3 days    CALL YOUR PRIMARY PHYSICIAN IF: You may resume all normal activity.  Monitor neck site for bleeding, swelling, or voice changes. If you notice bleeding or swelling immediately apply pressure to the site and call number below to speak with Cardiology Fellow.  If you experience any changes in your breathing you should call your doctor immediately or come to the closest Emergency Department.  Do not drive yourself.    ADDITIONAL INSTRUCTIONS: Medications: You are to resume all home medications including anticoagulation therapy unless otherwise advised by your primary cardiologist or nurse coordinator.    Follow Up: Per your primary cardiology team    If you have any questions or concerns regarding your procedure site please call [phone] at anytime and ask for Cardiology Fellow on call.  They are available 24 hours a day.  You may also contact the Cardiology Clinic after hours number at [phone].                                                       Telephone Numbers [phone] Monday-Friday 8:00 am to 4:30 pm    [phone] After 4:30 pm Monday-Friday, Weekends & Holidays  Ask for Interventional Cardiologist on call. Someone is on call 24 hours/day   [facility] toll free number [phone] Monday-Friday 8:00 am to 4:30 pm   [facility] Emergency Dept [phone]

## 2021-10-27 LAB
Lab: NORMAL
PATH REPORT.COMMENTS IMP SPEC: NORMAL
PATH REPORT.FINAL DX SPEC: NORMAL
PATH REPORT.GROSS SPEC: NORMAL
PATH REPORT.MICROSCOPIC SPEC OTHER STN: NORMAL
PATH REPORT.RELEVANT HX SPEC: NORMAL
PERFORMING LABORATORY: NORMAL
PHOTO IMAGE: NORMAL
SPECIMEN STATUS: NORMAL
TEST NAME: NORMAL

## 2021-10-27 PROCEDURE — 88350 IMFLUOR EA ADDL 1ANTB STN PX: CPT | Mod: 26 | Performed by: PATHOLOGY

## 2021-10-27 PROCEDURE — 88307 TISSUE EXAM BY PATHOLOGIST: CPT | Mod: 26 | Performed by: PATHOLOGY

## 2021-10-27 PROCEDURE — 88346 IMFLUOR 1ST 1ANTB STAIN PX: CPT | Mod: 26 | Performed by: PATHOLOGY

## 2021-10-28 LAB — MISCELLANEOUS TEST 1 (ARUP): NORMAL

## 2021-10-29 ENCOUNTER — TELEPHONE (OUTPATIENT)
Dept: TRANSPLANT | Facility: CLINIC | Age: 61
End: 2021-10-29

## 2021-10-29 LAB
ALLOMAP SCORE (EXTERNAL): 32 (ref 0–40)
IMMUKNOW IMMUNE CELL FUNCTION: 420 NG/ML
NEGATIVE PREDICTIVE VALUE PERCENT (EXTERNAL): 99 %
POSITIVE PREDICTIVE VALUE PERCENT (EXTERNAL): 2.9 %

## 2021-10-29 NOTE — TELEPHONE ENCOUNTER
Pt called with remaining results from this week's visit.  Everolimus level 6.8.  Goal 6-8.  No dose changes needed.  Immuknow score 420- mod immune cell response.  Heart biopsy after IMS change was negative.  Pt to continue taking MMF and Everolimus as ordered.  Pt states understanding all results/instructions.

## 2021-11-16 DIAGNOSIS — Z94.1 HEART REPLACED BY TRANSPLANT (H): ICD-10-CM

## 2021-11-16 RX ORDER — AMLODIPINE BESYLATE 5 MG/1
TABLET ORAL
Qty: 90 TABLET | Refills: 3 | Status: SHIPPED | OUTPATIENT
Start: 2021-11-16 | End: 2022-02-23

## 2021-11-23 DIAGNOSIS — Z94.1 HEART REPLACED BY TRANSPLANT (H): ICD-10-CM

## 2021-11-23 DIAGNOSIS — Z94.1 HEART REPLACED BY TRANSPLANT (H): Primary | ICD-10-CM

## 2021-11-23 DIAGNOSIS — E11.65 TYPE 2 DIABETES MELLITUS WITH HYPERGLYCEMIA, WITH LONG-TERM CURRENT USE OF INSULIN (H): ICD-10-CM

## 2021-11-23 DIAGNOSIS — Z79.4 TYPE 2 DIABETES MELLITUS WITH HYPERGLYCEMIA, WITH LONG-TERM CURRENT USE OF INSULIN (H): ICD-10-CM

## 2021-11-23 RX ORDER — PEN NEEDLE, DIABETIC 32GX 5/32"
NEEDLE, DISPOSABLE MISCELLANEOUS
Qty: 600 EACH | Refills: 3 | Status: SHIPPED | OUTPATIENT
Start: 2021-11-23

## 2021-11-23 RX ORDER — PANTOPRAZOLE SODIUM 40 MG/1
40 TABLET, DELAYED RELEASE ORAL
Qty: 90 TABLET | Refills: 3 | Status: SHIPPED | OUTPATIENT
Start: 2021-11-23 | End: 2022-11-17

## 2021-11-23 RX ORDER — HYDRALAZINE HYDROCHLORIDE 50 MG/1
100 TABLET, FILM COATED ORAL 3 TIMES DAILY
Qty: 540 TABLET | Refills: 3 | Status: SHIPPED | OUTPATIENT
Start: 2021-11-23 | End: 2022-11-17

## 2021-12-01 NOTE — PROGRESS NOTES
Red is a 61 year old who is being evaluated via a billable video visit.      Pt states that Priscila is no longer showing his sliding scale of what he should be taking so he has questions on that.    How would you like to obtain your AVS? MyChart  If the video visit is dropped, the invitation should be resent by: Text to cell phone: [phone]   Will anyone else be joining your video visit? No    Outcome for 12/01/21 8:40 AM:      Patient states his meter does not show the last 14 days, he has tried to figure out how to do so, and this is all he can find.  He is concerned with 10/26, he had a heart biopsy and that night he was npo after midnight, took a snack, did long term and short term things, the next morning he was 144 at 5:00am, then at 10:00am it was 240. He was unsure how it raised so quickly when he hadn't had anything to eat or drink from Midnight the night before.    12/1 367  11/30 198  Last 7 day average 275  Last 14 day average 293  Last 30 average 248  Last 90 average 231    Sasha Woodard, [facility]

## 2021-12-02 ENCOUNTER — VIRTUAL VISIT (OUTPATIENT)
Dept: ENDOCRINOLOGY | Facility: CLINIC | Age: 61
End: 2021-12-02
Payer: COMMERCIAL

## 2021-12-02 ENCOUNTER — TELEPHONE (OUTPATIENT)
Dept: ENDOCRINOLOGY | Facility: CLINIC | Age: 61
End: 2021-12-02

## 2021-12-02 DIAGNOSIS — Z94.1 STATUS POST HEART TRANSPLANTATION (H): ICD-10-CM

## 2021-12-02 DIAGNOSIS — E11.65 TYPE 2 DIABETES MELLITUS WITH HYPERGLYCEMIA, WITH LONG-TERM CURRENT USE OF INSULIN (H): Primary | ICD-10-CM

## 2021-12-02 DIAGNOSIS — Z79.4 TYPE 2 DIABETES MELLITUS WITH HYPERGLYCEMIA, WITH LONG-TERM CURRENT USE OF INSULIN (H): Primary | ICD-10-CM

## 2021-12-02 DIAGNOSIS — E11.65 TYPE 2 DIABETES MELLITUS WITH HYPERGLYCEMIA, WITH LONG-TERM CURRENT USE OF INSULIN (H): ICD-10-CM

## 2021-12-02 DIAGNOSIS — Z79.4 TYPE 2 DIABETES MELLITUS WITH HYPERGLYCEMIA, WITH LONG-TERM CURRENT USE OF INSULIN (H): ICD-10-CM

## 2021-12-02 PROCEDURE — 99215 OFFICE O/P EST HI 40 MIN: CPT | Mod: GT | Performed by: PHYSICIAN ASSISTANT

## 2021-12-02 RX ORDER — INSULIN LISPRO 100 [IU]/ML
INJECTION, SOLUTION INTRAVENOUS; SUBCUTANEOUS
Qty: 60 ML | Refills: 2 | Status: SHIPPED | OUTPATIENT
Start: 2021-12-02 | End: 2021-12-02

## 2021-12-02 RX ORDER — INSULIN LISPRO 100 [IU]/ML
INJECTION, SOLUTION INTRAVENOUS; SUBCUTANEOUS
Qty: 63 ML | Refills: 2 | Status: SHIPPED | OUTPATIENT
Start: 2021-12-02 | End: 2022-03-04

## 2021-12-02 RX ORDER — INSULIN LISPRO 100 [IU]/ML
INJECTION, SOLUTION INTRAVENOUS; SUBCUTANEOUS
Qty: 15 ML | Refills: 4 | Status: SHIPPED | OUTPATIENT
Start: 2021-12-02 | End: 2021-12-02

## 2021-12-02 NOTE — TELEPHONE ENCOUNTER
Attempted to reach patient to schedule follow up in the Endocrinology Clinic. No answer, LM on VM to call office back.    Schedule with TATYANA Ley.

## 2021-12-02 NOTE — PROGRESS NOTES
Due to the COVID 19 pandemic this visit was converted to a video visit in order to help prevent spread of infection in this patient and the general population.    Time of start: 9:00 am  Time of end: 9:17 am  Total duration of video visit: 17 minutes.    CHANELL Sharp ( Pepe ) is a 61 year old male with type 2 diabetes mellitus. Video visit today for diabetes care.  Pt was admitted 5/17/2020-5/29/2020 and underwent a heart transplant on 5/18/2020.  Pt's hx is significant for type 2 diabetes mellitus dx 4 years ago, HTN, hyperlipidemia, hx of ischemic heart disease s/p STEMI with ALYSSA 2019, LVAD placement, Stage 3 CKD, and obesity.  Red was also admitted 5/12-5/15/2020 for ANDREW due to nephrolithiasis complicated by hydronephrosis/ANDREW.  For his diabetes, pt is currently taking Lantus 28 units subcutaneous each pm and  Humalog 1 unit/5 gms CHO with meals with correction scale  ( 1 unit/25 for BG > 150 ) and bedtime correction scale ( 1 unit/25 for BG > 200).   Most recent A1C was 9.4 % on 10/26/2021.   Pt's A1C A1C was 10.3 % on 6/23/2021.  He has missed some insulin doses.  He was also without insulin for a few weeks.  His insurance will no longer cover his DexcomG6 sensor supplies, so he is not using his Dexcom sensor.  Red did provided me with blood sugar readings today and his average glucose is in the 200 range.  He denies hypoglycemia.  On ROS today, cough due to dry air per patient.   Breathing stable at this time. No fevers.  He states he overate over the Thanksgiving holiday.  Mild tingling in both feet. He denies foot ulcers.  Pt denies headaches, blurred vision, n/v, abd pain, diarrhea, dysuria or hematuria.  Pt received the COVID19 vaccines. No COVID booster yet.    Diabetes Care  Retinopathy:none; pt seen by Oph in Jan 2021.  Nephropathy:yes- hx of CKD/ANDREW.  Neuropathy: mild tingling in both feet.  Foot Exam:no exam today.  Taking aspirin: yes.  Lipids:  in Oct 2021. Pt is taking  Crestor.  CAD: yes; s/p heart transplant on 5/18/2020.  Depression: no.  Insulin: Basal and meal time insulin with correction scale ( 1 unit/25 for BG > 150 with meals and > 200 at bedtime).  Testing: glucose meter.  Pt has a DexcomG6 sensor - not using sensor since his insurance will not cover his Dexcom supplies.    ROS  See under HPI.    Allergies  No Known Allergies    Medications  Current Outpatient Medications   Medication Sig Dispense Refill     ACCU-CHEK [place]OTTE PLUS test strip Check BG 3 times daily at meals and at bedtime. 400 strip 3     acetaminophen (TYLENOL) 325 MG tablet Take 2 tablets (650 mg) by mouth every 4 hours as needed for other (multimodal surgical pain management along with NSAIDS and opioid medication as indicated based on pain control and physical function.)       amLODIPine (NORVASC) 5 MG tablet TAKE 1 TABLET BY MOUTH EVERY DAY. 90 tablet 3     BD SILVANA U/F 32G X 4 MM insulin pen needle Use 6 times daily. 600 each 3     calcium carbonate 600 mg-vitamin D 400 units (CALTRATE) 600-400 MG-UNIT per tablet Take 1 tablet by mouth 2 times daily (with meals)       everolimus (ZORTRESS) 0.5 MG tablet Take 4 tablets (2 mg) by mouth every morning AND 3 tablets (1.5 mg) every evening. 21 tablet 0     HUMALOG KWIKPEN 100 UNIT/ML soln Inject 1 unit/5 gms CHO with meals and snacks, plus correction.Approx 70 units in 24 hrs. 15 mL 4     hydrALAZINE (APRESOLINE) 50 MG tablet Take 2 tablets (100 mg) by mouth 3 times daily 540 tablet 3     insulin glargine (LANTUS PEN) 100 UNIT/ML pen Inject 30 units subcutaneous at hs. 15 mL 3     magnesium oxide (MAG-OX) 400 (241.3 Mg) MG tablet Take 1 tablet (400 mg) by mouth daily 90 tablet 3     multivitamin w/minerals (THERA-VIT-M) tablet Take 1 tablet by mouth daily 90 tablet 3     mycophenolate (GENERIC EQUIVALENT) 250 MG capsule Take 4 capsules (1,000 mg) by mouth 2 times daily 240 capsule 11     pantoprazole (PROTONIX) 40 MG EC tablet Take 1 tablet (40 mg) by mouth  every morning (before breakfast) 90 tablet 3     potassium chloride ER (KLOR-CON M) 20 MEQ CR tablet Take 1 tablet (20 mEq) by mouth daily 90 tablet 3     rosuvastatin (CRESTOR) 10 MG tablet Take 1 tablet (10 mg) by mouth daily 90 tablet 3     torsemide (DEMADEX) 20 MG tablet Take 1 tablet (20 mg) by mouth daily 90 tablet 3     loperamide (IMODIUM) 2 MG capsule Take 1 capsule (2 mg) by mouth 4 times daily as needed for diarrhea (Patient not taking: Reported on 12/2/2021) 60 capsule 0       Family History  Mother and father with hx of type 2 DM.  Social History   reports that he has never smoked. He has never used smokeless tobacco. He reports that he does not drink alcohol and does not use drugs.     Past Medical History  Past Medical History:   Diagnosis Date     Acute kidney injury (H)      CKD (chronic kidney disease)      Coronary artery disease      Diabetes mellitus (H)      HTN (hypertension)      Hyperlipidemia      ICD (implantable cardioverter-defibrillator), single chamber- NOT dependent 7/17/2018     Ischemic cardiomyopathy      LV (left ventricular) mural thrombus      Paroxysmal atrial flutter (H)      RVF (right ventricular failure) (H)      Systolic heart failure (H)      Ventricular tachycardia (H)        Past Surgical History:   Procedure Laterality Date     BRONCHOSCOPY (RIGID OR FLEXIBLE), DIAGNOSTIC N/A 9/15/2020    Procedure: BRONCHOSCOPY, WITH BRONCHOALVEOLAR LAVAGE;  Surgeon: Elder Mejia MD;  Location:  GI     BRONCHOSCOPY (RIGID OR FLEXIBLE), DIAGNOSTIC N/A 9/17/2020    Procedure: BRONCHOSCOPY, WITH BRONCHOALVEOLAR LAVAGE;  Surgeon: Bill Lemus MD;  Location:  OR     BRONCHOSCOPY RIGID OR FLEXIBLE W/TRANSENDOSCOPIC ENDOBRONCHIAL ULTRASOUND GUIDED Right 9/8/2020    Procedure: BRONCHOSCOPY, WITH ENDOBRONCHIAL ULTRASOUND;  Surgeon: Donny Mercedes MD;  Location:  GI     COLONOSCOPY N/A 12/7/2018    Procedure: COLONOSCOPY;  Surgeon: Krish Mercado MD;  Location:  OR      CV CORONARY ANGIOGRAM N/A 6/23/2021    Procedure: CV CORONARY ANGIOGRAM;  Surgeon: Brian Long MD;  [facility]: [facility]     CV HEART BIOPSY N/A 5/24/2020    Procedure: Heart Biopsy;  Surgeon: Kit Bacon MD;  [facility]     CV HEART BIOPSY N/A 6/1/2020    Procedure: CV HEART BIOPSY;  Surgeon: Kit Bacon MD;  [facility]     CV HEART BIOPSY N/A 6/8/2020    Procedure: CV HEART BIOPSY;  Surgeon: Kit Bacon MD;  [facility]     CV HEART BIOPSY N/A 6/15/2020    Procedure: CV HEART BIOPSY;  Surgeon: Kit Bacon MD;  [facility]     CV HEART BIOPSY N/A 6/30/2020    Procedure: CV HEART BIOPSY;  Surgeon: Kit Bacon MD;  [facility]     CV HEART BIOPSY N/A 7/14/2020    Procedure: CV HEART BIOPSY;  Surgeon: Brian Long MD;  [facility]     CV HEART BIOPSY N/A 7/29/2020    Procedure: CV HEART BIOPSY;  Surgeon: Momo Hunt MD;  [facility]     CV HEART BIOPSY N/A 8/13/2020    Procedure: CV HEART BIOPSY;  Surgeon: Khris Mcclelland MD;  [facility]     CV HEART BIOPSY N/A 8/26/2020    Procedure: CV HEART BIOPSY;  Surgeon: Momo Hunt MD;  [facility]     CV HEART BIOPSY N/A 9/30/2020    Procedure: CV HEART BIOPSY;  Surgeon: Artemio Ulloa MD;  [facility]     CV HEART BIOPSY N/A 10/29/2020    Procedure: CV HEART BIOPSY;  Surgeon: Kit Bacon MD;  [facility]     CV HEART BIOPSY N/A 1/5/2021    Procedure: CV HEART BIOPSY;  Surgeon: Carlin Garcia MD;  Location:  HEART CARDIAC CATH LAB     CV HEART BIOPSY N/A 6/23/2021    Procedure: CV HEART BIOPSY;  Surgeon: Brian Long MD;  [facility]     CV HEART BIOPSY N/A 10/26/2021    Procedure: CV HEART BIOPSY;  Surgeon: Kit Bacon MD;  Location:  HEART CARDIAC CATH LAB     CV RIGHT HEART CATH MEASUREMENTS RECORDED N/A 2/19/2019    Procedure: RHC;  Surgeon: Brian Long MD;  Location:  HEART CARDIAC CATH LAB     CV RIGHT HEART CATH MEASUREMENTS RECORDED N/A 7/5/2019    Procedure: CV RIGHT HEART CATH;  Surgeon: Khris Mcclelland MD;  Location:  HEART CARDIAC CATH LAB     CV RIGHT HEART CATH MEASUREMENTS RECORDED N/A 5/24/2020    Procedure: Right Heart Cath;  Surgeon: Kit Bacon MD;  Location:  HEART CARDIAC CATH LAB     CV RIGHT HEART CATH MEASUREMENTS RECORDED N/A 6/1/2020    Procedure: CV RIGHT HEART CATH;  Surgeon: Kit Bacon MD;  Location:  HEART CARDIAC CATH LAB     CV RIGHT HEART CATH MEASUREMENTS RECORDED N/A 6/8/2020    Procedure: CV RIGHT HEART CATH;  Surgeon: Kit Bacon MD;  Location:  HEART CARDIAC CATH LAB     CV RIGHT HEART CATH MEASUREMENTS RECORDED N/A 6/15/2020    Procedure: CV RIGHT HEART CATH;  Surgeon: Kit Bacon MD;  Location:  HEART CARDIAC CATH LAB     CV RIGHT HEART CATH MEASUREMENTS RECORDED N/A 6/30/2020    Procedure: CV RIGHT HEART CATH;  Surgeon: Kit Bacon MD;  Location:  HEART CARDIAC CATH LAB     CV RIGHT HEART CATH MEASUREMENTS RECORDED N/A 7/14/2020    Procedure: CV RIGHT HEART CATH;  Surgeon: Brian Long MD;  Location:  HEART CARDIAC CATH LAB     CV RIGHT HEART CATH MEASUREMENTS RECORDED N/A 7/29/2020    Procedure: CV RIGHT HEART CATH;  Surgeon: Momo Hunt MD;  Location:  HEART CARDIAC CATH LAB     CV RIGHT HEART CATH MEASUREMENTS RECORDED N/A 8/13/2020    Procedure: CV RIGHT HEART CATH;  Surgeon: Khris Mcclelland MD;  Location:  HEART CARDIAC CATH LAB     CV RIGHT HEART CATH MEASUREMENTS RECORDED N/A 8/26/2020    Procedure: CV  RIGHT HEART CATH;  Surgeon: Momo Hunt MD;  Location:  HEART CARDIAC CATH LAB     CV RIGHT HEART CATH MEASUREMENTS RECORDED N/A 9/30/2020    Procedure: Right Heart Cath;  Surgeon: Artemio Ulloa MD;  Location:  HEART CARDIAC CATH LAB     CV RIGHT HEART CATH MEASUREMENTS RECORDED N/A 10/29/2020    Procedure: CV RIGHT HEART CATH;  Surgeon: Kit Bacon MD;  Location:  HEART CARDIAC CATH LAB     CV RIGHT HEART CATH MEASUREMENTS RECORDED N/A 1/5/2021    Procedure: CV RIGHT HEART CATH;  Surgeon: Carlin Garcia MD;  Location:  HEART CARDIAC CATH LAB     CV RIGHT HEART CATH MEASUREMENTS RECORDED N/A 6/23/2021    Procedure: CV RIGHT HEART CATH;  Surgeon: Brian Long MD;  Location:  HEART CARDIAC CATH LAB     CV RIGHT HEART CATH MEASUREMENTS RECORDED N/A 10/26/2021    Procedure: CV RIGHT HEART CATH;  Surgeon: Kit Bacon MD;  Location:  HEART CARDIAC CATH LAB     ENDOBRONCHIAL ULTRASOUND FLEXIBLE N/A 9/17/2020    Procedure: BRONCHOSCOPY, flexible, endobronchial ultrasound with transbronchial biopsies, endobronchial biopsies;  Surgeon: Bill Lemus MD;  Location:  OR      PRQ TRLUML CORONARY ANGIOPLASTY ADDL BRANCH      PCI and ALYSSA to ostial LAD on 6/27/18     IMPLANT AUTOMATIC IMPLANTABLE CARDIOVERTER DEFIBRILLATOR       INSERT VENTRICULAR ASSIST DEVICE LEFT (HEARTMATE II) N/A 12/31/2018    Procedure: Median Sternotomy, On Cardiopulmonary Bypass Pump, Insertion of Left Ventricular Assist Device (Heartmate III);  Surgeon: Kamaljit Baker MD;  Location: [facility]     PICC DOUBLE LUMEN PLACEMENT Right 05/22/2020    5FR DL PICC inserted at right basilic vein, length 38cm.     TRANSPLANT HEART RECIPIENT AFTER HEART MATE N/A 5/18/2020    Procedure: REDO MEDIAN STERNOTOMY, LYSIS OF ADHESIONS, ON CARDIOPULMONARY BYPASS PUMP, HEART TRANSPLANT RECIPIENT, REMOVAL OF LEFT VENTRICULAR ASSIST DEVICE, REMOVAL OF AUTOMATED IMPLANTABLE CARDIOVERTER DEFIBRILLATOR;   Surgeon: Elder Willis MD;  Location: [facility]       Physical Exam    No exam today.    RESULTS  Creatinine   Date Value Ref Range Status   10/26/2021 2.04 (H) 0.66 - 1.25 mg/dL Final   06/23/2021 1.93 (H) 0.66 - 1.25 mg/dL Final     GFR Estimate   Date Value Ref Range Status   10/26/2021 35 (L) >60 mL/min/1.73m2 Final     Comment:     As of July 11, 2021, eGFR is calculated by the CKD-EPI creatinine equation, without race adjustment. eGFR can be influenced by muscle mass, exercise, and diet. The reported eGFR is an estimation only and is only applicable if the renal function is stable.   06/23/2021 37 (L) >60 mL/min/[1.73_m2] Final     Comment:     Non  GFR Calc  Starting 12/18/2018, serum creatinine based estimated GFR (eGFR) will be   calculated using the Chronic Kidney Disease Epidemiology Collaboration   (CKD-EPI) equation.       Hemoglobin A1C   Date Value Ref Range Status   10/26/2021 9.4 (H) 0.0 - 5.6 % Final     Comment:     Normal <5.7%   Prediabetes 5.7-6.4%    Diabetes 6.5% or higher     Note: Adopted from ADA consensus guidelines.   06/23/2021 10.3 (H) 0 - 5.6 % Final     Comment:     Normal <5.7% Prediabetes 5.7-6.4%  Diabetes 6.5% or higher - adopted from ADA   consensus guidelines.       Potassium   Date Value Ref Range Status   10/26/2021 3.9 3.4 - 5.3 mmol/L Final   06/23/2021 3.6 3.4 - 5.3 mmol/L Final     ALT   Date Value Ref Range Status   10/26/2021 34 0 - 70 U/L Final   06/23/2021 26 0 - 70 U/L Final     AST   Date Value Ref Range Status   10/26/2021 27 0 - 45 U/L Final   06/23/2021 22 0 - 45 U/L Final     TSH   Date Value Ref Range Status   03/03/2021 1.87 0.40 - 4.00 mU/L Final     T4 Free   Date Value Ref Range Status   03/03/2021 1.08 0.76 - 1.46 ng/dL Final       Cholesterol   Date Value Ref Range Status   10/26/2021 205 (H) <200 mg/dL Final   06/23/2021 168 <200 mg/dL Final   10/29/2020 161 <200 mg/dL Final     HDL Cholesterol   Date Value Ref Range Status    06/23/2021 49 >39 mg/dL Final   10/29/2020 51 >39 mg/dL Final     Direct Measure HDL   Date Value Ref Range Status   10/26/2021 40 >=40 mg/dL Final     LDL Cholesterol Calculated   Date Value Ref Range Status   10/26/2021   Final     Comment:     Cannot estimate LDL when triglyceride exceeds 400 mg/dL   06/23/2021 75 <100 mg/dL Final     Comment:     Desirable:       <100 mg/dl   10/29/2020 81 <100 mg/dL Final     Comment:     Desirable:       <100 mg/dl     LDL Cholesterol Direct   Date Value Ref Range Status   10/26/2021 105 (H) <100 mg/dL Final     Comment:     Age 0-19 years:  Desirable: 0-110 mg/dL   Borderline high: 110-129 mg/dL   High: >= 130 mg/dL    Age 20 years and older:  Desirable: <100mg/dL  Above desirable: 100-129 mg/dL   Borderline high: 130-159 mg/dL   High: 160-189 mg/dL   Very high: >= 190 mg/dL     Triglycerides   Date Value Ref Range Status   10/26/2021 451 (H) <150 mg/dL Final   06/23/2021 220 (H) <150 mg/dL Final     Comment:     Borderline high:  150-199 mg/dl  High:             200-499 mg/dl  Very high:       >499 mg/dl     10/29/2020 144 <150 mg/dL Final       ASSESSMENT/PLAN:      1.  TYPE 2 DIABETES MELLITUS: Uncontrolled type 2 diabetes mellitus.  Pt instructed to take insulin as prescribed and to take insulin for all food intake including snacks.  Increase Lantus 30 units subcutaneous at hs and continue Humalog 1 unit/5 gms CHO with meals and snacks,plus correction insulin 1 unit/25 for BG > 150 with meals and > 200 at bedtime.  Avoid use of Metformin and oral diabetic meds due to CKD and lower GFR.  Pt is unable to afford his DexcomG6 sensor supplies.   Pt was seen by Oph in Jan 2021 without retinopathy.  He reports mild tingling in his feet.  No foot ulcers at this time.  He is taking ASA daily.  Red received the COVID19 vaccines (Pf[employer]). No COVID booster yet.  Reminded him to get the flu vaccine this season.    2.  CKD/ANDREW: Creat was 2.04 with GFR 35 mL/min in Oct  2021.  Avoid  use of Metformin.  Hold on starting SGLT2 at this time due to lower GFR.    3.  HX OF ISCHEMIC CARDIOMYOPATHY: S/P heart transplant on 5/18/2020 and followed closely by transplant staff here.     4.  FOLLOW UP : with me in 2/2022.  A1C to be done in late Jan 2022-ordered.    Time spent reviewing chart, labs and glucose data today = 8 minutes.  Time for video visit today = 17  minutes.  Total time for documentation today = 15 minutes.    TOTAL TIME FOR VIDEO VISIT TODAY=  40 minutes.    Jeannette Schafer PA-C

## 2021-12-02 NOTE — LETTER
12/2/2021       RE: Mariusz Sharp  [address] [facility] [address]     Dear Colleague,    Thank you for referring your patient, Mariusz Sharp, to the [facility] [place] at [facility]. Please see a copy of my visit note below.    Red is a 61 year old who is being evaluated via a billable video visit.      Pt states that Mychart is no longer showing his sliding scale of what he should be taking so he has questions on that.    How would you like to obtain your AVS? MyChart  If the video visit is dropped, the invitation should be resent by: Text to cell phone: [phone]   Will anyone else be joining your video visit? No    Outcome for 12/01/21 8:40 AM:      Patient states his meter does not show the last 14 days, he has tried to figure out how to do so, and this is all he can find.  He is concerned with 10/26, he had a heart biopsy and that night he was npo after midnight, took a snack, did long term and short term things, the next morning he was 144 at 5:00am, then at 10:00am it was 240. He was unsure how it raised so quickly when he hadn't had anything to eat or drink from Midnight the night before.    12/1 367  11/30 198  Last 7 day average 275  Last 14 day average 293  Last 30 average 248  Last 90 average 231    Sasha Woodard, IZZY        Due to the COVID 19 pandemic this visit was converted to a video visit in order to help prevent spread of infection in this patient and the general population.    Time of start: 9:00 am  Time of end: 9:17 am  Total duration of video visit: 17 minutes.    HPI  Mariusz Moon ) ISABEL Sharp is a 61 year old male with type 2 diabetes mellitus. Video visit today for diabetes care.  Pt was admitted 5/17/2020-5/29/2020 and underwent a heart transplant on 5/18/2020.  Pt's hx is significant for type 2 diabetes mellitus dx 4 years ago, HTN, hyperlipidemia, hx of ischemic heart disease s/p STEMI with ALYSSA  2019, LVAD placement, Stage 3 CKD, and obesity.  Red was also admitted 5/12-5/15/2020 for ANDREW due to nephrolithiasis complicated by hydronephrosis/ANDREW.  For his diabetes, pt is currently taking Lantus 28 units subcutaneous each pm and  Humalog 1 unit/5 gms CHO with meals with correction scale  ( 1 unit/25 for BG > 150 ) and bedtime correction scale ( 1 unit/25 for BG > 200).   Most recent A1C was 9.4 % on 10/26/2021.   Pt's A1C A1C was 10.3 % on 6/23/2021.  He has missed some insulin doses.  He was also without insulin for a few weeks.  His insurance will no longer cover his DexcomG6 sensor supplies, so he is not using his Dexcom sensor.  Red did provided me with blood sugar readings today and his average glucose is in the 200 range.  He denies hypoglycemia.  On ROS today, cough due to dry air per patient.   Breathing stable at this time. No fevers.  He states he overate over the Thanksgiving holiday.  Mild tingling in both feet. He denies foot ulcers.  Pt denies headaches, blurred vision, n/v, abd pain, diarrhea, dysuria or hematuria.  Pt received the COVID19 vaccines. No COVID booster yet.    Diabetes Care  Retinopathy:none; pt seen by Oph in Jan 2021.  Nephropathy:yes- hx of CKD/ANDREW.  Neuropathy: mild tingling in both feet.  Foot Exam:no exam today.  Taking aspirin: yes.  Lipids:  in Oct 2021. Pt is taking Crestor.  CAD: yes; s/p heart transplant on 5/18/2020.  Depression: no.  Insulin: Basal and meal time insulin with correction scale ( 1 unit/25 for BG > 150 with meals and > 200 at bedtime).  Testing: glucose meter.  Pt has a DexcomG6 sensor - not using sensor since his insurance will not cover his Dexcom supplies.    ROS  See under HPI.    Allergies  No Known Allergies    Medications  Current Outpatient Medications   Medication Sig Dispense Refill     ACCU-CHEK CHARLOTTE PLUS test strip Check BG 3 times daily at meals and at bedtime. 400 strip 3     acetaminophen (TYLENOL) 325 MG tablet Take 2 tablets  (650 mg) by mouth every 4 hours as needed for other (multimodal surgical pain management along with NSAIDS and opioid medication as indicated based on pain control and physical function.)       amLODIPine (NORVASC) 5 MG tablet TAKE 1 TABLET BY MOUTH EVERY DAY. 90 tablet 3     BD SILVANA U/F 32G X 4 MM insulin pen needle Use 6 times daily. 600 each 3     calcium carbonate 600 mg-vitamin D 400 units (CALTRATE) 600-400 MG-UNIT per tablet Take 1 tablet by mouth 2 times daily (with meals)       everolimus (ZORTRESS) 0.5 MG tablet Take 4 tablets (2 mg) by mouth every morning AND 3 tablets (1.5 mg) every evening. 21 tablet 0     HUMALOG KWIKPEN 100 UNIT/ML soln Inject 1 unit/5 gms CHO with meals and snacks, plus correction.Approx 70 units in 24 hrs. 15 mL 4     hydrALAZINE (APRESOLINE) 50 MG tablet Take 2 tablets (100 mg) by mouth 3 times daily 540 tablet 3     insulin glargine (LANTUS PEN) 100 UNIT/ML pen Inject 30 units subcutaneous at hs. 15 mL 3     magnesium oxide (MAG-OX) 400 (241.3 Mg) MG tablet Take 1 tablet (400 mg) by mouth daily 90 tablet 3     multivitamin w/minerals (THERA-VIT-M) tablet Take 1 tablet by mouth daily 90 tablet 3     mycophenolate (GENERIC EQUIVALENT) 250 MG capsule Take 4 capsules (1,000 mg) by mouth 2 times daily 240 capsule 11     pantoprazole (PROTONIX) 40 MG EC tablet Take 1 tablet (40 mg) by mouth every morning (before breakfast) 90 tablet 3     potassium chloride ER (KLOR-CON M) 20 MEQ CR tablet Take 1 tablet (20 mEq) by mouth daily 90 tablet 3     rosuvastatin (CRESTOR) 10 MG tablet Take 1 tablet (10 mg) by mouth daily 90 tablet 3     torsemide (DEMADEX) 20 MG tablet Take 1 tablet (20 mg) by mouth daily 90 tablet 3     loperamide (IMODIUM) 2 MG capsule Take 1 capsule (2 mg) by mouth 4 times daily as needed for diarrhea (Patient not taking: Reported on 12/2/2021) 60 capsule 0       Family History  Mother and father with hx of type 2 DM.  Social History   reports that he has never smoked. He  has never used smokeless tobacco. He reports that he does not drink alcohol and does not use drugs.     Past Medical History  Past Medical History:   Diagnosis Date     Acute kidney injury (H)      CKD (chronic kidney disease)      Coronary artery disease      Diabetes mellitus (H)      HTN (hypertension)      Hyperlipidemia      ICD (implantable cardioverter-defibrillator), single chamber- NOT dependent 7/17/2018     Ischemic cardiomyopathy      LV (left ventricular) mural thrombus      Paroxysmal atrial flutter (H)      RVF (right ventricular failure) (H)      Systolic heart failure (H)      Ventricular tachycardia (H)        Past Surgical History:   Procedure Laterality Date     BRONCHOSCOPY (RIGID OR FLEXIBLE), DIAGNOSTIC N/A 9/15/2020    Procedure: BRONCHOSCOPY, WITH BRONCHOALVEOLAR LAVAGE;  Surgeon: Elder Mejia MD;  Location:  GI     BRONCHOSCOPY (RIGID OR FLEXIBLE), DIAGNOSTIC N/A 9/17/2020    Procedure: BRONCHOSCOPY, WITH BRONCHOALVEOLAR LAVAGE;  Surgeon: Bill Lemus MD;  Location:  OR     BRONCHOSCOPY RIGID OR FLEXIBLE W/TRANSENDOSCOPIC ENDOBRONCHIAL ULTRASOUND GUIDED Right 9/8/2020    Procedure: BRONCHOSCOPY, WITH ENDOBRONCHIAL ULTRASOUND;  Surgeon: Donny Mercedes MD;  Location:  GI     COLONOSCOPY N/A 12/7/2018    Procedure: COLONOSCOPY;  Surgeon: Krish Mercado MD;  Location:  OR     CV CORONARY ANGIOGRAM N/A 6/23/2021    Procedure: CV CORONARY ANGIOGRAM;  Surgeon: Brian Long MD;  Location:  HEART CARDIAC CATH LAB     CV HEART BIOPSY N/A 5/24/2020    Procedure: Heart Biopsy;  Surgeon: Kit Bacon MD;  Location:  HEART CARDIAC CATH LAB     CV HEART BIOPSY N/A 6/1/2020    Procedure: CV HEART BIOPSY;  Surgeon: Kit Bacon MD;  Location:  HEART CARDIAC CATH LAB     CV HEART BIOPSY N/A 6/8/2020    Procedure: CV HEART BIOPSY;  Surgeon: Kit Bacon MD;  Location:  HEART CARDIAC CATH LAB     CV HEART BIOPSY N/A  6/15/2020    Procedure: CV HEART BIOPSY;  Surgeon: Kit Bacon MD;  Location: [facility] HEART CARDIAC CATH LAB     CV HEART BIOPSY N/A 6/30/2020    Procedure: CV HEART BIOPSY;  Surgeon: Kit Bacon MD;  Location: [facility] HEART [facility]ARD[facility]AC C[facility] LAB     CV HEART BIOPSY N/A 7/14/2020    Procedure: CV HEART BIOPSY;  Surgeon: Brian Long MD;  Location: [facility] HEART [facility]ARD[facility]AC [facility] LAB     CV HEART BIOPSY N/A 7/29/2020    Procedure: CV HEART BIOPSY;  Surgeon: Momo Hunt MD;  Location: U HEART CARDIAC CATH LAB     CV HEART BIOPSY N/A 8/13/2020    Procedure: CV HEART BIOPSY;  Surgeon: Khris Mcclelland MD;  Location: U HEART CARDIAC CATH LAB     CV HEART BIOPSY N/A 8/26/2020    Procedure: CV HEART BIOPSY;  Surgeon: Momo Hunt MD;  Location: U HEART CARDIAC CATH LAB     CV HEART BIOPSY N/A 9/30/2020    Procedure: CV HEART BIOPSY;  Surgeon: Artemio Ulloa MD;  Location: U HEART CARDIAC CATH LAB     CV HEART BIOPSY N/A 10/29/2020    Procedure: CV HEART BIOPSY;  Surgeon: Kit Bacon MD;  Location:  HEART CARDIAC CATH LAB     CV HEART BIOPSY N/A 1/5/2021    Procedure: CV HEART BIOPSY;  Surgeon: Carlin Garcia MD;  Location: [facility] HEART CARDIAC CATH LAB     CV HEART BIOPSY N/A 6/23/2021    Procedure: CV HEART BIOPSY;  Surgeon: Brian Long MD;  Location: U HEART CARDIAC CATH LAB     CV HEART BIOPSY N/A 10/26/2021    Procedure: CV HEART BIOPSY;  Surgeon: Kit Bacon MD;  Location:  HEART CARDIAC CATH LAB     CV RIGHT HEART CATH MEASUREMENTS RECORDED N/A 2/19/2019    Procedure: RHC;  Surgeon: Brian Long MD;  Location:  HEART CARDIAC CATH LAB     CV RIGHT HEART CATH MEASUREMENTS RECORDED N/A 7/5/2019    Procedure: CV RIGHT HEART CATH;  Surgeon: Khris Mcclelland MD;  Location:  HEART CARDIAC CATH LAB     CV RIGHT HEART CATH MEASUREMENTS RECORDED N/A 5/24/2020    Procedure: Right Heart Cath;  Surgeon: Jordi  Kit Pham MD;  Location:  HEART CARDIAC CATH LAB     CV RIGHT HEART CATH MEASUREMENTS RECORDED N/A 6/1/2020    Procedure: CV RIGHT HEART CATH;  Surgeon: Kit Bacon MD;  Location:  HEART CARDIAC CATH LAB     CV RIGHT HEART CATH MEASUREMENTS RECORDED N/A 6/8/2020    Procedure: CV RIGHT HEART CATH;  Surgeon: Kti Bacon MD;  Location:  HEART CARDIAC CATH LAB     CV RIGHT HEART CATH MEASUREMENTS RECORDED N/A 6/15/2020    Procedure: CV RIGHT HEART CATH;  Surgeon: Kit Bacon MD;  Location:  HEART CARDIAC CATH LAB     CV RIGHT HEART CATH MEASUREMENTS RECORDED N/A 6/30/2020    Procedure: CV RIGHT HEART CATH;  Surgeon: Kit Bacon MD;  Location:  HEART CARDIAC CATH LAB     CV RIGHT HEART CATH MEASUREMENTS RECORDED N/A 7/14/2020    Procedure: CV RIGHT HEART CATH;  Surgeon: Brian Long MD;  Location:  HEART CARDIAC CATH LAB     CV RIGHT HEART CATH MEASUREMENTS RECORDED N/A 7/29/2020    Procedure: CV RIGHT HEART CATH;  Surgeon: Momo Hunt MD;  Location:  HEART CARDIAC CATH LAB     CV RIGHT HEART CATH MEASUREMENTS RECORDED N/A 8/13/2020    Procedure: CV RIGHT HEART CATH;  Surgeon: Khris Mcclelland MD;  Location:  HEART CARDIAC CATH LAB     CV RIGHT HEART CATH MEASUREMENTS RECORDED N/A 8/26/2020    Procedure: CV RIGHT HEART CATH;  Surgeon: Momo Hunt MD;  Location:  HEART CARDIAC CATH LAB     CV RIGHT HEART CATH MEASUREMENTS RECORDED N/A 9/30/2020    Procedure: Right Heart Cath;  Surgeon: Artemio Ulloa MD;  Location:  HEART CARDIAC CATH LAB     CV RIGHT HEART CATH MEASUREMENTS RECORDED N/A 10/29/2020    Procedure: CV RIGHT HEART CATH;  Surgeon: Kit Bacon MD;  Location:  HEART CARDIAC CATH LAB     CV RIGHT HEART CATH MEASUREMENTS RECORDED N/A 1/5/2021    Procedure: CV RIGHT HEART CATH;  Surgeon: Carlin Garcia MD;  Location:  HEART CARDIAC CATH LAB     CV  RIGHT HEART CATH MEASUREMENTS RECORDED N/A 6/23/2021    Procedure: CV RIGHT HEART CATH;  Surgeon: Brian Long MD;  Location:  HEART CARDIAC CATH LAB     CV RIGHT HEART CATH MEASUREMENTS RECORDED N/A 10/26/2021    Procedure: CV RIGHT HEART CATH;  Surgeon: Kit Bacon MD;  Location:  HEART CARDIAC CATH LAB     ENDOBRONCHIAL ULTRASOUND FLEXIBLE N/A 9/17/2020    Procedure: BRONCHOSCOPY, flexible, endobronchial ultrasound with transbronchial biopsies, endobronchial biopsies;  Surgeon: Bill Lemus MD;  Location: [facility] OR     HC PRQ [id number] CORONARY ANGIOPLASTY ADDL BRANCH      PCI and ALYSSA to ostial LAD on 6/27/18     IMPLANT AUTOMATIC IMPLANTABLE CARDIOVERTER DEFIBRILLATOR       INSERT VENTRICULAR ASSIST DEVICE LEFT (HEARTMATE II) N/A 12/31/2018    Procedure: Median Sternotomy, On Cardiopulmonary Bypass Pump, Insertion of Left Ventricular Assist Device (Heartmate III);  Surgeon: Kamaljit Baker MD;  Location: [facility] OR     PICC DOUBLE LUMEN PLACEMENT Right 05/22/2020    5FR DL PICC inserted at right basilic vein, length 38cm.     TRANSPLANT HEART RECIPIENT AFTER HEART MATE N/A 5/18/2020    Procedure: REDO MEDIAN STERNOTOMY, LYSIS OF ADHESIONS, ON CARDIOPULMONARY BYPASS PUMP, HEART TRANSPLANT RECIPIENT, REMOVAL OF LEFT VENTRICULAR ASSIST DEVICE, REMOVAL OF AUTOMATED IMPLANTABLE CARDIOVERTER DEFIBRILLATOR;  Surgeon: Elder Willis MD;  Location: [facility] OR       Physical Exam    No exam today.    RESULTS  Creatinine   Date Value Ref Range Status   10/26/2021 2.04 (H) 0.66 - 1.25 mg/dL Final   06/23/2021 1.93 (H) 0.66 - 1.25 mg/dL Final     GFR Estimate   Date Value Ref Range Status   10/26/2021 35 (L) >60 mL/min/1.73m2 Final     Comment:     As of July 11, 2021, eGFR is calculated by the CKD-EPI creatinine equation, without race adjustment. eGFR can be influenced by muscle mass, exercise, and diet. The reported eGFR is an estimation only and is only applicable if the renal function is stable.    06/23/2021 37 (L) >60 mL/min/[1.73_m2] Final     Comment:     Non  GFR Calc  Starting 12/18/2018, serum creatinine based estimated GFR (eGFR) will be   calculated using the Chronic Kidney Disease Epidemiology Collaboration   (CKD-EPI) equation.       Hemoglobin A1C   Date Value Ref Range Status   10/26/2021 9.4 (H) 0.0 - 5.6 % Final     Comment:     Normal <5.7%   Prediabetes 5.7-6.4%    Diabetes 6.5% or higher     Note: Adopted from ADA consensus guidelines.   06/23/2021 10.3 (H) 0 - 5.6 % Final     Comment:     Normal <5.7% Prediabetes 5.7-6.4%  Diabetes 6.5% or higher - adopted from ADA   consensus guidelines.       Potassium   Date Value Ref Range Status   10/26/2021 3.9 3.4 - 5.3 mmol/L Final   06/23/2021 3.6 3.4 - 5.3 mmol/L Final     ALT   Date Value Ref Range Status   10/26/2021 34 0 - 70 U/L Final   06/23/2021 26 0 - 70 U/L Final     AST   Date Value Ref Range Status   10/26/2021 27 0 - 45 U/L Final   06/23/2021 22 0 - 45 U/L Final     TSH   Date Value Ref Range Status   03/03/2021 1.87 0.40 - 4.00 mU/L Final     T4 Free   Date Value Ref Range Status   03/03/2021 1.08 0.76 - 1.46 ng/dL Final       Cholesterol   Date Value Ref Range Status   10/26/2021 205 (H) <200 mg/dL Final   06/23/2021 168 <200 mg/dL Final   10/29/2020 161 <200 mg/dL Final     HDL Cholesterol   Date Value Ref Range Status   06/23/2021 49 >39 mg/dL Final   10/29/2020 51 >39 mg/dL Final     Direct Measure HDL   Date Value Ref Range Status   10/26/2021 40 >=40 mg/dL Final     LDL Cholesterol Calculated   Date Value Ref Range Status   10/26/2021   Final     Comment:     Cannot estimate LDL when triglyceride exceeds 400 mg/dL   06/23/2021 75 <100 mg/dL Final     Comment:     Desirable:       <100 mg/dl   10/29/2020 81 <100 mg/dL Final     Comment:     Desirable:       <100 mg/dl     LDL Cholesterol Direct   Date Value Ref Range Status   10/26/2021 105 (H) <100 mg/dL Final     Comment:     Age 0-19 years:  Desirable: 0-110  mg/dL   Borderline high: 110-129 mg/dL   High: >= 130 mg/dL    Age 20 years and older:  Desirable: <100mg/dL  Above desirable: 100-129 mg/dL   Borderline high: 130-159 mg/dL   High: 160-189 mg/dL   Very high: >= 190 mg/dL     Triglycerides   Date Value Ref Range Status   10/26/2021 451 (H) <150 mg/dL Final   06/23/2021 220 (H) <150 mg/dL Final     Comment:     Borderline high:  150-199 mg/dl  High:             200-499 mg/dl  Very high:       >499 mg/dl     10/29/2020 144 <150 mg/dL Final       ASSESSMENT/PLAN:      1.  TYPE 2 DIABETES MELLITUS: Uncontrolled type 2 diabetes mellitus.  Pt instructed to take insulin as prescribed and to take insulin for all food intake including snacks.  Increase Lantus 30 units subcutaneous at hs and continue Humalog 1 unit/5 gms CHO with meals and snacks,plus correction insulin 1 unit/25 for BG > 150 with meals and > 200 at bedtime.  Avoid use of Metformin and oral diabetic meds due to CKD and lower GFR.  Pt is unable to afford his DexcomG6 sensor supplies.   Pt was seen by Oph in Jan 2021 without retinopathy.  He reports mild tingling in his feet.  No foot ulcers at this time.  He is taking ASA daily.  Red received the COVID19 vaccines (Pfizer). No COVID booster yet.  Reminded him to get the flu vaccine this season.    2.  CKD/ANDREW: Creat was 2.04 with GFR 35 mL/min in Oct  2021.  Avoid use of Metformin.  Hold on starting SGLT2 at this time due to lower GFR.    3.  HX OF ISCHEMIC CARDIOMYOPATHY: S/P heart transplant on 5/18/2020 and followed closely by transplant staff here.     4.  FOLLOW UP : with me in 2/2022.  A1C to be done in late Jan 2022-ordered.    Time spent reviewing chart, labs and glucose data today = 8 minutes.  Time for video visit today = 17  minutes.  Total time for documentation today = 15 minutes.    TOTAL TIME FOR VIDEO VISIT TODAY=  40 minutes.    Jeannette Schafer PA-C

## 2021-12-09 ENCOUNTER — TELEPHONE (OUTPATIENT)
Dept: TRANSPLANT | Facility: CLINIC | Age: 61
End: 2021-12-09
Payer: COMMERCIAL

## 2021-12-09 NOTE — TELEPHONE ENCOUNTER
Pt is working on his paperwork for his [employer] for the next year  Wonders if we have copy of last year form  His Medicare card went thru the wash and hard to read  Takes 3 wks to get a new card Just needs a copy of Medicare card

## 2021-12-20 DIAGNOSIS — Z94.1 STATUS POST HEART TRANSPLANTATION (H): ICD-10-CM

## 2021-12-20 RX ORDER — EVEROLIMUS 0.5 MG/1
TABLET ORAL
Qty: 630 TABLET | Refills: 3 | Status: SHIPPED | OUTPATIENT
Start: 2021-12-20 | End: 2022-12-06

## 2021-12-23 ENCOUNTER — PRE VISIT (OUTPATIENT)
Dept: UROLOGY | Facility: CLINIC | Age: 61
End: 2021-12-23
Payer: COMMERCIAL

## 2022-01-10 ENCOUNTER — TELEPHONE (OUTPATIENT)
Dept: UROLOGY | Facility: CLINIC | Age: 62
End: 2022-01-10
Payer: COMMERCIAL

## 2022-01-10 NOTE — CONFIDENTIAL NOTE
Called the pt to inquire about PSA. Pt stated that he gets PSA done at [facility]. Orders will be faxed to the clinic and patient will be notified. Pt expressed understanding and knows that he needs to get PSA done prior to his appt on 1/18.

## 2022-01-11 DIAGNOSIS — R97.20 ELEVATED PROSTATE SPECIFIC ANTIGEN (PSA): Primary | ICD-10-CM

## 2022-01-11 NOTE — CONFIDENTIAL NOTE
Left voicemail informing the pt that the order for PSA was faxed to [facility] today. Pt should make an appt to get his blood drawn for PSA prior to his upcoming appt on 1/18.

## 2022-01-12 ENCOUNTER — TRANSFERRED RECORDS (OUTPATIENT)
Dept: HEALTH INFORMATION MANAGEMENT | Facility: CLINIC | Age: 62
End: 2022-01-12
Payer: COMMERCIAL

## 2022-01-17 NOTE — TELEPHONE ENCOUNTER
Action 01/17/2022 JTV 2:25pm   Action Taken PSA results from [facility] are in Media Tab.

## 2022-01-18 ENCOUNTER — OFFICE VISIT (OUTPATIENT)
Dept: UROLOGY | Facility: CLINIC | Age: 62
End: 2022-01-18
Payer: COMMERCIAL

## 2022-01-18 VITALS
DIASTOLIC BLOOD PRESSURE: 91 MMHG | SYSTOLIC BLOOD PRESSURE: 142 MMHG | BODY MASS INDEX: 34.31 KG/M2 | HEIGHT: 64 IN | WEIGHT: 201 LBS | HEART RATE: 120 BPM

## 2022-01-18 DIAGNOSIS — R97.20 ELEVATED PROSTATE SPECIFIC ANTIGEN (PSA): Primary | ICD-10-CM

## 2022-01-18 PROCEDURE — 99213 OFFICE O/P EST LOW 20 MIN: CPT | Performed by: UROLOGY

## 2022-01-18 ASSESSMENT — PAIN SCALES - GENERAL: PAINLEVEL: NO PAIN (0)

## 2022-01-18 ASSESSMENT — MIFFLIN-ST. JEOR: SCORE: 1637.73

## 2022-01-18 NOTE — PATIENT INSTRUCTIONS
Please see Dr. Gomes in 6 months for PSA follow up. This can be a virtual appointment.     It was a pleasure meeting with you today.  Thank you for allowing me and my team the privilege of caring for you today.  YOU are the reason we are here, and I truly hope we provided you with the excellent service you deserve.  Please let us know if there is anything else we can do for you so that we can be sure you are leaving completely satisfied with your care experience.

## 2022-01-18 NOTE — NURSING NOTE
"Chief Complaint   Patient presents with     RECHECK     PSA - history of elevated PSA       Blood pressure (!) 142/91, pulse 120, height 1.626 m (5' 4\"), weight 91.2 kg (201 lb). Body mass index is 34.5 kg/m .    Patient Active Problem List   Diagnosis     Type 2 diabetes mellitus with hyperglycemia, with long-term current use of insulin (H)     Weakness     Chronic systolic congestive heart failure (H)     ASCVD (arteriosclerotic cardiovascular disease)     ANDREW (acute kidney injury) (H)     CKD (chronic kidney disease) stage 3, GFR 30-59 ml/min (H)     Dyslipidemia     Hypotension, unspecified hypotension type     Hematuria     Left ventricular apical thrombus following MI (H)     Long term current use of anticoagulant     Monomorphic ventricular tachycardia (H)     ST elevation myocardial infarction involving left anterior descending (LAD) coronary artery (H)     Type 2 diabetes mellitus with hyperglycemia (H)     Unilateral inguinal hernia     Ureteral obstruction     Hydronephrosis     Biventricular heart failure (H)     Status post heart transplantation (H)     Heart replaced by transplant (H)     Pneumonia     Chronic prostatitis without hematuria     Prophylactic antibiotic     Rhinovirus infection     Kidney stone     Fever     Lung mass     Acute kidney failure, unspecified (H)     Elevated prostate specific antigen (PSA)     Status post coronary angiogram       No Known Allergies    Current Outpatient Medications   Medication Sig Dispense Refill     ACCU-CHEK CHARLOTTE PLUS test strip Check BG 3 times daily at meals and at bedtime. 400 strip 3     acetaminophen (TYLENOL) 325 MG tablet Take 2 tablets (650 mg) by mouth every 4 hours as needed for other (multimodal surgical pain management along with NSAIDS and opioid medication as indicated based on pain control and physical function.)       amLODIPine (NORVASC) 5 MG tablet TAKE 1 TABLET BY MOUTH EVERY DAY. 90 tablet 3     BD SILVANA U/F 32G X 4 MM insulin pen " needle Use 6 times daily. 600 each 3     calcium carbonate 600 mg-vitamin D 400 units (CALTRATE) 600-400 MG-UNIT per tablet Take 1 tablet by mouth 2 times daily (with meals)       everolimus (ZORTRESS) 0.5 MG tablet Take 4 tablets (2 mg) by mouth every morning AND 3 tablets (1.5 mg) every evening. 630 tablet 3     HUMALOG KWIKPEN 100 UNIT/ML soln Inject 1 unit/5 gms CHO with meals and snacks, plus correction.Approx 70 units in 24 hrs. 63 mL 2     hydrALAZINE (APRESOLINE) 50 MG tablet Take 2 tablets (100 mg) by mouth 3 times daily 540 tablet 3     insulin glargine (LANTUS PEN) 100 UNIT/ML pen Inject 30 units subcutaneous at hs. 15 mL 3     magnesium oxide (MAG-OX) 400 (241.3 Mg) MG tablet Take 1 tablet (400 mg) by mouth daily 90 tablet 3     multivitamin w/minerals (THERA-VIT-M) tablet Take 1 tablet by mouth daily 90 tablet 3     mycophenolate (GENERIC EQUIVALENT) 250 MG capsule Take 4 capsules (1,000 mg) by mouth 2 times daily 240 capsule 11     pantoprazole (PROTONIX) 40 MG EC tablet Take 1 tablet (40 mg) by mouth every morning (before breakfast) 90 tablet 3     potassium chloride ER (KLOR-CON M) 20 MEQ CR tablet Take 1 tablet (20 mEq) by mouth daily 90 tablet 3     rosuvastatin (CRESTOR) 10 MG tablet Take 1 tablet (10 mg) by mouth daily 90 tablet 3     torsemide (DEMADEX) 20 MG tablet Take 1 tablet (20 mg) by mouth daily 90 tablet 3     loperamide (IMODIUM) 2 MG capsule Take 1 capsule (2 mg) by mouth 4 times daily as needed for diarrhea (Patient not taking: Reported on 12/2/2021) 60 capsule 0       Social History     Tobacco Use     Smoking status: Never Smoker     Smokeless tobacco: Never Used   Substance Use Topics     Alcohol use: No     Drug use: No       Airam Allison CMA  1/18/2022  12:48 PM

## 2022-01-18 NOTE — PROGRESS NOTES
"  CHIEF COMPLAINT   It was my pleasure to see Mariusz Sharp who is a 61 year old male for follow-up of Elevated PSA.      HPI  Mariusz Sharp is a very pleasant 61 year old male who presents with a history of Elevated PSA. Was initially noted to be elevated in May around the time of his heart transplant. MRI was PIRADS 2.  Of note, he has a known large renal stone on the left and has been referred to Dr. Carlos.  PSA has declined since initial evaluation in 2020, most recently down to 7.96.     PSA  1/14/2022 - 7.96  6/23/2021 - 9.64  9/30/2020 - 11.0 (10% free)  8/19/2020 - 10.80  5/18/2020 - 8.21  5/15/2020 - 8.51  3/27/2019 - 3.83     MRI Prostate  5/28/2020  IMPRESSION:  1. Based on the most suspicious abnormality, this exam is  characterized as PIRADS 2 - Clinically significant cancer is unlikely  to be present.   Benign prostatic hyperplasia changes with estimated  volume of 47 gram.  2. No suspicious adenopathy or evidence of pelvic metastases.     PHYSICAL EXAM  Patient is a 61 year old  male   Vitals: Blood pressure (!) 142/91, pulse 120, height 1.626 m (5' 4\"), weight 91.2 kg (201 lb).  General Appearance Adult: Body mass index is 34.5 kg/m .  Alert, no acute distress, oriented  Lungs: no respiratory distress, or pursed lip breathing  Abdomen: soft, nontender, no organomegaly or masses  Back: no CVAT  Neuro: Alert, oriented, speech and mentation normal  Psych: affect and mood normal    Outside and Past Medical records:  Review of the result(s) of each unique test - PSA, MRI    ASSESSMENT and PLAN  61 year old male with history of heart transplant in May 2020. During hospitalizations, found to have elevated PSA as high as 11.0 in 9/2020. He has continued to recover with regard to his heart.  Also has large renal stone for which he follows with Dr. Carlos. Regarding his PSA, this has declined since initial evaluation and continues to trend down. MRI with PIRADS 2 lesion. Given these findings, we " discussed option of TRUS biopsy, but given risk of complications and favorable PSA trend/MRI findings, ongoing observation is reasonable.     - Follow-up 6 months with PSA    20 minutes spent on the date of the encounter doing chart review, history and exam, documentation and further activities as noted above.    Donny Gomes MD  Urology  [facility]

## 2022-01-29 ENCOUNTER — HEALTH MAINTENANCE LETTER (OUTPATIENT)
Age: 62
End: 2022-01-29

## 2022-02-18 ENCOUNTER — TELEPHONE (OUTPATIENT)
Dept: TRANSPLANT | Facility: CLINIC | Age: 62
End: 2022-02-18
Payer: COMMERCIAL

## 2022-02-18 DIAGNOSIS — Z94.1 STATUS POST HEART TRANSPLANTATION (H): Primary | ICD-10-CM

## 2022-02-22 ENCOUNTER — TELEPHONE (OUTPATIENT)
Dept: TRANSPLANT | Facility: CLINIC | Age: 62
End: 2022-02-22
Payer: COMMERCIAL

## 2022-02-22 NOTE — TELEPHONE ENCOUNTER
VM left with pt regarding weather/rescheduling.  If roads are bad tomorrow AM, we will reschedule pt.  Requested call from pt in the AM with update as to whether or not he plans to come to appointment.

## 2022-02-22 NOTE — TELEPHONE ENCOUNTER
Patient called to touch base with his RNCC regarding appointments for tomorrow if he should reschedule do to the weather.

## 2022-02-23 ENCOUNTER — OFFICE VISIT (OUTPATIENT)
Dept: CARDIOLOGY | Facility: CLINIC | Age: 62
End: 2022-02-23
Attending: INTERNAL MEDICINE
Payer: COMMERCIAL

## 2022-02-23 ENCOUNTER — LAB (OUTPATIENT)
Dept: LAB | Facility: CLINIC | Age: 62
End: 2022-02-23
Payer: COMMERCIAL

## 2022-02-23 ENCOUNTER — TELEPHONE (OUTPATIENT)
Dept: ONCOLOGY | Facility: CLINIC | Age: 62
End: 2022-02-23

## 2022-02-23 VITALS
OXYGEN SATURATION: 97 % | WEIGHT: 202.5 LBS | BODY MASS INDEX: 33.74 KG/M2 | HEIGHT: 65 IN | DIASTOLIC BLOOD PRESSURE: 99 MMHG | SYSTOLIC BLOOD PRESSURE: 133 MMHG | HEART RATE: 121 BPM

## 2022-02-23 DIAGNOSIS — R97.20 ELEVATED PROSTATE SPECIFIC ANTIGEN (PSA): ICD-10-CM

## 2022-02-23 DIAGNOSIS — Z94.1 HEART REPLACED BY TRANSPLANT (H): ICD-10-CM

## 2022-02-23 DIAGNOSIS — Z79.4 TYPE 2 DIABETES MELLITUS WITH HYPERGLYCEMIA, WITH LONG-TERM CURRENT USE OF INSULIN (H): ICD-10-CM

## 2022-02-23 DIAGNOSIS — Z94.1 STATUS POST HEART TRANSPLANTATION (H): ICD-10-CM

## 2022-02-23 DIAGNOSIS — Z94.1 STATUS POST HEART TRANSPLANTATION (H): Primary | ICD-10-CM

## 2022-02-23 DIAGNOSIS — E11.65 TYPE 2 DIABETES MELLITUS WITH HYPERGLYCEMIA, WITH LONG-TERM CURRENT USE OF INSULIN (H): ICD-10-CM

## 2022-02-23 LAB
ANION GAP SERPL CALCULATED.3IONS-SCNC: 11 MMOL/L (ref 3–14)
BASOPHILS # BLD AUTO: 0.1 10E3/UL (ref 0–0.2)
BASOPHILS NFR BLD AUTO: 1 %
BUN SERPL-MCNC: 38 MG/DL (ref 7–30)
CALCIUM SERPL-MCNC: 8.9 MG/DL (ref 8.5–10.1)
CHLORIDE BLD-SCNC: 107 MMOL/L (ref 94–109)
CMV DNA SPEC NAA+PROBE-ACNC: NOT DETECTED IU/ML
CO2 SERPL-SCNC: 21 MMOL/L (ref 20–32)
CREAT SERPL-MCNC: 1.89 MG/DL (ref 0.66–1.25)
EOSINOPHIL # BLD AUTO: 0.1 10E3/UL (ref 0–0.7)
EOSINOPHIL NFR BLD AUTO: 2 %
ERYTHROCYTE [DISTWIDTH] IN BLOOD BY AUTOMATED COUNT: 14.4 % (ref 10–15)
GFR SERPL CREATININE-BSD FRML MDRD: 40 ML/MIN/1.73M2
GLUCOSE BLD-MCNC: 159 MG/DL (ref 70–99)
HBA1C MFR BLD: 8.6 % (ref 0–5.6)
HCT VFR BLD AUTO: 36.6 % (ref 40–53)
HGB BLD-MCNC: 12 G/DL (ref 13.3–17.7)
IMM GRANULOCYTES # BLD: 0 10E3/UL
IMM GRANULOCYTES NFR BLD: 0 %
LVEF ECHO: NORMAL
LYMPHOCYTES # BLD AUTO: 0.9 10E3/UL (ref 0.8–5.3)
LYMPHOCYTES NFR BLD AUTO: 15 %
MAGNESIUM SERPL-MCNC: 2 MG/DL (ref 1.6–2.3)
MCH RBC QN AUTO: 26.4 PG (ref 26.5–33)
MCHC RBC AUTO-ENTMCNC: 32.8 G/DL (ref 31.5–36.5)
MCV RBC AUTO: 81 FL (ref 78–100)
MONOCYTES # BLD AUTO: 0.8 10E3/UL (ref 0–1.3)
MONOCYTES NFR BLD AUTO: 12 %
NEUTROPHILS # BLD AUTO: 4.4 10E3/UL (ref 1.6–8.3)
NEUTROPHILS NFR BLD AUTO: 70 %
NRBC # BLD AUTO: 0 10E3/UL
NRBC BLD AUTO-RTO: 0 /100
PHOSPHATE SERPL-MCNC: 2.7 MG/DL (ref 2.5–4.5)
PLATELET # BLD AUTO: 269 10E3/UL (ref 150–450)
POTASSIUM BLD-SCNC: 3.5 MMOL/L (ref 3.4–5.3)
PSA SERPL-MCNC: 8.57 UG/L (ref 0–4)
RBC # BLD AUTO: 4.54 10E6/UL (ref 4.4–5.9)
SODIUM SERPL-SCNC: 139 MMOL/L (ref 133–144)
TACROLIMUS BLD-MCNC: <3 UG/L (ref 5–15)
TME LAST DOSE: ABNORMAL H
TME LAST DOSE: ABNORMAL H
WBC # BLD AUTO: 6.3 10E3/UL (ref 4–11)

## 2022-02-23 PROCEDURE — 36415 COLL VENOUS BLD VENIPUNCTURE: CPT | Performed by: PATHOLOGY

## 2022-02-23 PROCEDURE — G0463 HOSPITAL OUTPT CLINIC VISIT: HCPCS

## 2022-02-23 PROCEDURE — 83036 HEMOGLOBIN GLYCOSYLATED A1C: CPT | Performed by: PATHOLOGY

## 2022-02-23 PROCEDURE — 93306 TTE W/DOPPLER COMPLETE: CPT | Performed by: INTERNAL MEDICINE

## 2022-02-23 PROCEDURE — 84153 ASSAY OF PSA TOTAL: CPT | Performed by: PATHOLOGY

## 2022-02-23 PROCEDURE — 85025 COMPLETE CBC W/AUTO DIFF WBC: CPT | Performed by: PATHOLOGY

## 2022-02-23 PROCEDURE — 99207 PR STATISTIC IV PUSH SINGLE INITIAL SUBSTANCE: CPT | Performed by: INTERNAL MEDICINE

## 2022-02-23 PROCEDURE — 80197 ASSAY OF TACROLIMUS: CPT | Performed by: INTERNAL MEDICINE

## 2022-02-23 PROCEDURE — 80169 DRUG ASSAY EVEROLIMUS: CPT

## 2022-02-23 PROCEDURE — 83735 ASSAY OF MAGNESIUM: CPT | Performed by: PATHOLOGY

## 2022-02-23 PROCEDURE — 87799 DETECT AGENT NOS DNA QUANT: CPT | Performed by: INTERNAL MEDICINE

## 2022-02-23 PROCEDURE — 80048 BASIC METABOLIC PNL TOTAL CA: CPT | Performed by: PATHOLOGY

## 2022-02-23 PROCEDURE — 99215 OFFICE O/P EST HI 40 MIN: CPT | Mod: 25 | Performed by: INTERNAL MEDICINE

## 2022-02-23 PROCEDURE — 84100 ASSAY OF PHOSPHORUS: CPT | Performed by: PATHOLOGY

## 2022-02-23 RX ORDER — AMLODIPINE BESYLATE 10 MG/1
5 TABLET ORAL DAILY
Qty: 90 TABLET | Refills: 3 | Status: SHIPPED | OUTPATIENT
Start: 2022-02-23 | End: 2022-05-11

## 2022-02-23 RX ADMIN — Medication 6 ML: at 12:06

## 2022-02-23 ASSESSMENT — PAIN SCALES - GENERAL: PAINLEVEL: NO PAIN (0)

## 2022-02-23 NOTE — NURSING NOTE
Transplant Coordinator Note     Reason for visit: 20 month visit   Coordinator: Present      Health concerns addressed today:   1. Elevated PSA- follows with Dr. Gomes   2. Creat 2ish- on MMF and Evero only.   3. Diabetes- follows with Endo here at the [facility]- added Hgb A1C to labs today. Recommend Farxiga to Endo?? Make sure he gets follow up.   4. Triglycerides high- Dr. Ortiz feels this is because of his poorly controled diabetes- working with TATYANA Ley here at the [facility].   5. Recent fall. 4 weeks ago. Hurt his left ribs. Not from dizziness. Just accidental.   6. C/o some heart fluttering.   7. BP repeat in clinic = 144/90  8. Make sure we get a UA at 2nd annual.   9. Pt asked about kidney stone? Please follow up with Dr. Carlos with urology...      Immunosuppressants:   MMF 1000 BID   Evero 2/1.5, Today's level pending. Today's level might be off (pt stated he forgot one of 3 pills and didn't realize it until middle of night).       Routine screenings:     Derm: DUE- did he see?   Dental: DUE- did he see?   Colonoscopy: Due 2022   Prostate: PSA 9.64 follows with Dr. Gomes   Eye: Up to date   Flu/Pneumonia: Covid x2- did he get booster?  Reminded at last visit to get Flu and Covid booster.   Pneumonia UTD, also needs Shingrix- was going to ask PMD.       Labs, echo, meds reviewed with patient   Medication record reviewed and reconciled   Questions and concerns addressed   Pt verbalized an understanding of plan of care.     Patient Instructions   1. Increase amlodipine to 10 mg daily. A new prescription sent to your local [facility].   2. Please follow up with Endocrinology (they wanted follow up in Feb). We recommend they start Farxiga  3. If you have increased heart fluttering, please let Angelika know.   4. We will contact urology team for follow up re: kidney stone.     Next transplant clinic appointment:  In May for 2nd annual w/ biplane angio, rhc/hbx  Next lab draw: To be determined after we get  today's lab results.   Coordinator will call with all pending results.     Please call transplant coordinator with any questions:     Kiera Neumann RN BSN   Post Heart Transplant Nurse Coordinator   [facility]   Questions: [phone]

## 2022-02-23 NOTE — LETTER
"2/23/2022      RE: Mariusz Sharp  [address] [facility] [address]       Dear Colleague,    Thank you for the opportunity to participate in the care of your patient, Mariusz Sharp, at the [facility] [place] at [facility]. Please see a copy of my visit note below.    ADULT HEART TRANSPLANT CLINIC  February 23, 2022    HPI:    \"Red\"Aron is a 61yr old male with a history of CAD (STEMI with ALYSSA to LAD 6/27/18), ICM (LVEF 20-25%) s/p HM3 LVAD (12/31/18), monomorphic VT (s/p ICD with shocks), LV thrombus, CKD, elevated PSA, pAF, HTN, HL, and DMII, now s/p OHT 5/18/20, who presents for ongoing evaluation and management.     His post-transplant course was c/b NSVT (with no hemodynamic compromise), leukocytosis with C Acnes growing from his former LVAD site (thought to be a contaminant, but treated with IV vanco --> po doxy through 6/1/20, 14d course total), and in the setting of donor blood growing S anginosus and Veillonella (also thought to be a contaminant, but treated with Vanco/zosyn --> Vanco/Flagyl for a total of 7 days).  He also had hyperkalemia, which improved following kayexalate and stopping bactrim.  He ultimately discharged 5/29/20.     He was readmitted 8/31-9/24/20 with fever, cough, diarrhea, and syncope.  He was found to have a post-obstructive vs aspiration pneumonia, RUL/suprahilar mass, and narrowing of multiple RUL bronchi.  He was initially treated as a fungal infection as his fungitelle was +, but he did not clinically improve, nor did the size of the mass change.  Ultimately, tissue culture from the RUL mass showed abiotrophia defectiva (oral microbe), which was thought to be a contaminant, but did respond to antibiotics.  His MMF was decreased to 500mg twice daily, as ImmuKnow was noted to be low.      Since his last visit, he states that he feels okay overall. He denies any chest pain or pressure, sob, orthopnea, pnd, " syncope/presyncope, gasper, headaches, acute vision changes, fevers, chills, nausea/vomiting or diarrhea.  He denies any problems with his medications but admits issues with his glycemic control.    Serostatus:  CMV D+/R-  EBV D-/R+  Toxo D-/R-      Patient Active Problem List    Diagnosis Date Noted     Status post coronary angiogram 06/23/2021     Priority: Medium     Elevated prostate specific antigen (PSA) 01/07/2021     Priority: Medium     Acute kidney failure, unspecified (H) 09/18/2020     Priority: Medium     Fever 08/31/2020     Priority: Medium     Lung mass 08/31/2020     Priority: Medium     Added automatically from request for surgery [id number]       Kidney stone 08/28/2020     Priority: Medium     Rhinovirus infection 08/20/2020     Priority: Medium     Chronic prostatitis without hematuria 08/19/2020     Priority: Medium     Prophylactic antibiotic 08/19/2020     Priority: Medium     Pneumonia 08/18/2020     Priority: Medium     Heart replaced by transplant (H) 05/26/2020     Priority: Medium     Added automatically from request for surgery [id number]       Biventricular heart failure (H) 05/17/2020     Priority: Medium     Added automatically from request for surgery [id number]       Status post heart transplantation (H) 05/17/2020     Priority: Medium     Added automatically from request for surgery [id number]       Hydronephrosis 05/12/2020     Priority: Medium     Chronic systolic congestive heart failure (H) 02/13/2019     Priority: Medium     Added automatically from request for surgery [id number]       Weakness 01/11/2019     Priority: Medium     Type 2 diabetes mellitus with hyperglycemia, with long-term current use of insulin (H) 07/20/2018     Priority: Medium     Left ventricular apical thrombus following MI (H) 07/06/2018     Priority: Medium     Hematuria 07/04/2018     Priority: Medium     Long term current use of anticoagulant 07/03/2018     Priority: Medium     Hypotension, unspecified hypotension type  07/02/2018     Priority: Medium     Monomorphic ventricular tachycardia (H) 07/02/2018     Priority: Medium     Overview:   Added automatically from request for surgery [id number]       ASCVD (arteriosclerotic cardiovascular disease) 06/27/2018     Priority: Medium     CKD (chronic kidney disease) stage 3, GFR 30-59 ml/min (H) 06/27/2018     Priority: Medium     Dyslipidemia 06/27/2018     Priority: Medium     ST elevation myocardial infarction involving left anterior descending (LAD) coronary artery (H) 06/27/2018     Priority: Medium     Type 2 diabetes mellitus with hyperglycemia (H) 06/27/2018     Priority: Medium     ANDREW (acute kidney injury) (H) 10/06/2016     Priority: Medium     Ureteral obstruction 10/06/2016     Priority: Medium     Unilateral inguinal hernia 09/09/2009     Priority: Medium     Overview:   IMO Update 10/11         PAST MEDICAL HISTORY:  Past Medical History:   Diagnosis Date     Acute kidney injury (H)      CKD (chronic kidney disease)      Coronary artery disease      Diabetes mellitus (H)      HTN (hypertension)      Hyperlipidemia      ICD (implantable cardioverter-defibrillator), single chamber- NOT dependent 7/17/2018     Ischemic cardiomyopathy      LV (left ventricular) mural thrombus      Paroxysmal atrial flutter (H)      RVF (right ventricular failure) (H)      Systolic heart failure (H)      Ventricular tachycardia (H)        CURRENT MEDICATIONS:  ACCU-CHEK CHARLOTTE PLUS test strip, Check BG 3 times daily at meals and at bedtime.  acetaminophen (TYLENOL) 325 MG tablet, Take 2 tablets (650 mg) by mouth every 4 hours as needed for other (multimodal surgical pain management along with NSAIDS and opioid medication as indicated based on pain control and physical function.)  BD SILVANA U/F 32G X 4 MM insulin pen needle, Use 6 times daily.  calcium carbonate 600 mg-vitamin D 400 units (CALTRATE) 600-400 MG-UNIT per tablet, Take 1 tablet by mouth 2 times daily (with meals)  everolimus (ZORTRESS)  "0.5 MG tablet, Take 4 tablets (2 mg) by mouth every morning AND 3 tablets (1.5 mg) every evening.  hydrALAZINE (APRESOLINE) 50 MG tablet, Take 2 tablets (100 mg) by mouth 3 times daily  magnesium oxide (MAG-OX) 400 (241.3 Mg) MG tablet, Take 1 tablet (400 mg) by mouth daily  multivitamin w/minerals (THERA-VIT-M) tablet, Take 1 tablet by mouth daily  mycophenolate (GENERIC EQUIVALENT) 250 MG capsule, Take 4 capsules (1,000 mg) by mouth 2 times daily  pantoprazole (PROTONIX) 40 MG EC tablet, Take 1 tablet (40 mg) by mouth every morning (before breakfast)  potassium chloride ER (KLOR-CON M) 20 MEQ CR tablet, Take 1 tablet (20 mEq) by mouth daily  rosuvastatin (CRESTOR) 10 MG tablet, Take 1 tablet (10 mg) by mouth daily  torsemide (DEMADEX) 20 MG tablet, Take 1 tablet (20 mg) by mouth daily (Patient not taking: Reported on 3/16/2022)  loperamide (IMODIUM) 2 MG capsule, Take 1 capsule (2 mg) by mouth 4 times daily as needed for diarrhea (Patient not taking: Reported on 12/2/2021)    No current facility-administered medications on file prior to visit.      Exam:  BP (!) 133/99 (BP Location: Right arm, Patient Position: Sitting, Cuff Size: Adult Regular)   Pulse (!) 121   Ht 1.644 m (5' 4.72\")   Wt 91.9 kg (202 lb 8 oz)   SpO2 97%   BMI 33.99 kg/m     Repeat manual /90  General: appears comfortable, alert and articulate  Head: normocephalic, atraumatic  Eyes: anicteric sclera, EOMI  Neck: no adenopathy, 2+ carotids without bruits  Orophyarynx: moist mucosa, no lesions, dentition intact  Heart: regular, S1/S2, no murmur, gallop, rub, estimated JVP <10cm  Lungs: clear, no rales or wheezing  Abdomen: soft, non-tender, bowel sounds present, no hepatomegaly  Extremities: no clubbing, cyanosis or edema  Neurological: normal speech and affect, no gross motor deficits.    Labs:  CBC RESULTS:   Lab Results   Component Value Date    WBC 6.3 02/23/2022    WBC 5.3 06/23/2021    RBC 4.54 02/23/2022    RBC 4.22 (L) 06/23/2021 "    HGB 12.0 (L) 02/23/2022    HGB 10.1 (L) 06/23/2021    HCT 36.6 (L) 02/23/2022    HCT 34.6 (L) 06/23/2021    MCV 81 02/23/2022    MCV 82 06/23/2021    MCH 26.4 (L) 02/23/2022    MCH 26.3 (L) 06/23/2021    MCHC 32.8 02/23/2022    MCHC 32.1 06/23/2021    RDW 14.4 02/23/2022    RDW 12.7 06/23/2021     02/23/2022     06/23/2021       BMP RESULTS:  Lab Results   Component Value Date     02/23/2022     06/23/2021    POTASSIUM 3.5 02/23/2022    POTASSIUM 3.6 06/23/2021    CHLORIDE 107 03/23/2022    CHLORIDE 105 06/23/2021    CO2 21 02/23/2022    CO2 24 06/23/2021    ANIONGAP 11 02/23/2022    ANIONGAP 7 06/23/2021     (H) 02/23/2022     (H) 06/23/2021    BUN 38 (H) 02/23/2022    BUN 35 (H) 06/23/2021    CR 1.89 (H) 02/23/2022    CR 1.93 (H) 06/23/2021    GFRESTIMATED 40 (L) 02/23/2022    GFRESTIMATED 37 (L) 06/23/2021    GFRESTBLACK 43 (L) 06/23/2021    ARLENE 8.9 02/23/2022    ARLENE 9.3 06/23/2021      LIPID RESULTS:  Lab Results   Component Value Date    CHOL 205 (H) 10/26/2021    CHOL 168 06/23/2021    HDL 40 10/26/2021    HDL 49 06/23/2021    LDL  10/26/2021      Comment:      Cannot estimate LDL when triglyceride exceeds 400 mg/dL     (H) 10/26/2021    LDL 75 06/23/2021    TRIG 451 (H) 10/26/2021    TRIG 220 (H) 06/23/2021    NHDL 165 (H) 10/26/2021    NHDL 119 06/23/2021       IMMUNOSUPPRESSANT LEVELS  Lab Results   Component Value Date    TACROL <3.0 (L) 02/23/2022    TACROL 3.5 (L) 06/23/2021    DOSTAC  02/23/2022      Comment:      Last dose information not provided.    DOSTAC Not Provided 06/23/2021       No components found for: CK  Lab Results   Component Value Date    MAG 2.0 02/23/2022    MAG 1.9 06/23/2021     Lab Results   Component Value Date    A1C 8.6 (H) 02/23/2022    A1C 10.3 (H) 06/23/2021     Lab Results   Component Value Date    PHOS 2.7 02/23/2022    PHOS 3.4 06/23/2021     Lab Results   Component Value Date    NTBNPI 1,673 (H) 08/18/2020     Lab Results  "  Component Value Date    SAITESTMET [facility] 06/23/2021    SAICELL Class I 06/23/2021    [id number] Cw:15 06/23/2021    [id number] Cw:18 06/23/2021    SAIREPCOM  06/23/2021      Test performed by modified procedure. Serum heat inactivated and tested   by a modified ([place]) protocol including fetal calf serum addition.   High-risk, mfi >3,000. Mod-risk, mfi 500-3,000.       Lab Results   Component Value Date    SAIITESTME [facility] 06/23/2021    SAIICELL Class II 06/23/2021    [id number] None 06/23/2021    [id number] None 06/23/2021    SAIIREPCOM  06/23/2021      Test performed by modified procedure. Serum heat inactivated and tested   by a modified ([place]) protocol including fetal calf serum addition.   High-risk, mfi >3,000. Mod-risk, mfi 500-3,000.       Lab Results   Component Value Date    CSPEC Plasma 06/23/2021       Echo 2/23/22  Global and regional left ventricular function is normal with an EF of 55-60%.  Right ventricular function, chamber size, wall motion, and thickness are normal.  Pulmonary artery systolic pressure cannot be assessed.  The inferior vena cava is normal.  No significant changes noted.      Assessment and Plan:   \"Anju Sharp is a 61yr old male with a history of CAD (STEMI with ALYSSA to LAD 6/27/18), ICM (LVEF 20-25%) s/p HM3 LVAD (12/31/18), monomorphic VT (s/p ICD with shocks), LV thrombus, CKD, elevated PSA, pAF, HTN, HL, and DMII, now s/p OHT 5/18/20, who presents for ongoing evaluation and management         ICM, s/p OHT 5/18/20  His post-transplant course was c/b NSVT (with no hemodynamic compromise), leukocytosis with C Acnes growing from his former LVAD site (thought to be a contaminant, but treated with IV vanco --> po doxy through 6/1/20, 14d course total), and in the setting of donor blood growing S anginosus and Veillonella (also thought to be a contaminant, but treated with Vanco/zosyn --> Vanco/Flagyl for a total of 7 days).  He also had hyperkalemia, which improved following " kayexalate and stopping bactrim.  He ultimately discharged 5/29/20.     He was readmitted 8/31-9/24/20 with fever, cough, diarrhea, and syncope.  He was found to have a post-obstructive vs aspiration pneumonia, RUL/suprahilar mass, and narrowing of multiple RUL bronchi.  He was initially treated as a fungal infection as his fungitelle was +, but he did not clinically improve, nor did the size of the mass change.  Ultimately, tissue culture from the RUL mass showed abiotrophia defectiva (oral microbe), which was thought to be a contaminant, but did respond to antibiotics.  His MMF was decreased to 500mg twice daily, as ImmuKnow was noted to be low.    Rejection history:  none  AlloMap scores:  Last several have remained < 35  DSAs:  none  Coronary angio/Ischemic eval: Coronary angiogram 6/2021 showed normal coronary arteries with no angiographic evidence of obstruction.  Last RHC:  10/2021 RA 6, mPA 22, PCW 17, and CI 2.7.  Echo/cMRI: TTE today confirmed stable graft function, with LVEF 55-60% and normal RV size/function.    Serostatus:  - CMV D+/R-  - EBV D-/R+  - Toxo D-/R-     Immunosuppression:  - MMF 1000mg BID  - everolimus, goal level 6-8.      PPx:  - CAV:  Continue rosuvastatin 10mg daily.  - GI:  Pantoprazole 40mg daily  - Osteoporosis:  Calcium/vitamin d supplements     Graft function:  - BPs:  slightly above goal, increase amlodipine to 10mg daily.  Continue hydralazine 100mg TID.  Instructed patient to continue to monitor BP and if consistently > 135/85 call clinic  - Encouraged patient to continue regular aerobic exercise aiming for at least 150 minutes of moderate physical activity or 75 minutes of vigorous physical activity - or an equal combination of both - each week. and follow low-salt, heart healthy diet.  - fluid status:  Euvolemic     Health maintenance:  -- completed COVID shots, needs booster  -- needs flu shot  -- needs shingrix   -- pneumonia shots unclear -- advised that he follow-up with  PCP  -- derm exam overdue, never seen  -- eye exam current, gets yearly exams  -- dental exam overdue, prior to heart attack  -- last colo 12/2018, due now per his report         Post-obstructive versus aspiration pneumonia  RUL/suprahilar mass   +abiotrophia defectiva  He was readmitted 8/31/20-9/24/20 with fever, cough, diarrhea, and syncope.  He was found to have a post-obstructive vs aspiration pneumonia, RUL/suprahilar mass, and narrowing of multiple RUL bronchi.  He was initially treated as a fungal infection as his fungitelle was +, but he did not clinically improve, nor did the size of the mass change.  Ultimately, tissue culture from the RUL mass showed abiotrophia defectiva (oral microbe), which was thought to be a contaminant, but did respond to antibiotics.  His MMF was decreased to 500mg twice daily, as ImmuKnow was noted to be low.  He completed 4 weeks of antibiotic therapy 10/11/20, and follow up chest CTs have shown improvement in the RUL infiltrate.     DMII  HgbA1c has remained above goal.  TG also elevated likely 2/2 poor glycemic control.  Followed by endocrine.  Would recommend adding farxiga     Elevated PSA  Prostate MRI 5/2020 showed signs of BPH, he follows with urology.       Follow-up:  In May for 2nd annual w/ biplane angio, rhc/hbx, echo and labs.  Will be happy to see sooner if change in clinical status or new questions/concerns arise.      Jenni Ortiz MD  Section Head - Advanced Heart Failure, Transplantation and Mechanical Circulatory Support  Director - Adult Congenital and Cardiovascular Genetics Center  Associate Professor of Medicine, [facility]    I spent a total of 45 minutes today in chart review as well as personally reviewing recent cardiac testing and/or laboratory results, today's history and examination, and discussion and counseling with the patient      Please do not hesitate to contact me if you have any questions/concerns.     Sincerely,     Jenni  Shelby Ortiz MD

## 2022-02-23 NOTE — NURSING NOTE
Chief Complaint   Patient presents with     Follow Up     Return Heart Transplant   Vitals were taken and medications reconciled.    Kobi Garcia, EMT  12:42 PM

## 2022-02-25 ENCOUNTER — TELEPHONE (OUTPATIENT)
Dept: TRANSPLANT | Facility: CLINIC | Age: 62
End: 2022-02-25
Payer: COMMERCIAL

## 2022-02-25 DIAGNOSIS — Z94.1 STATUS POST HEART TRANSPLANTATION (H): Primary | ICD-10-CM

## 2022-02-25 LAB
ALLOMAP SCORE (EXTERNAL): 21 (ref 0–40)
EBV DNA # SPEC NAA+PROBE: NOT DETECTED COPIES/ML
EVEROLIMUS BLD-MCNC: 9.5 UG/L (ref 3–8)
NEGATIVE PREDICTIVE VALUE PERCENT (EXTERNAL): 100 %
POSITIVE PREDICTIVE VALUE PERCENT (EXTERNAL): 1.9 %
TME LAST DOSE: ABNORMAL H
TME LAST DOSE: ABNORMAL H

## 2022-02-25 NOTE — TELEPHONE ENCOUNTER
Pt called with remaining lab results from this week's visit.  Allomap 21  CMV and EBV negative  Per pt Everolimus level not a 12 hour trough- he miss one of his PM doses and took at 2am.  Pt to recheck next week.  Orders sent.

## 2022-02-25 NOTE — LETTER
[facility] [phone] (Phone)  [phone] (Fax)       2022    Patient Name: Mariusz Sharp   :  1960   MRN: [id number]  ICD10:  z94.1 , z79.899    Subject: Request for Labs    Mariusz Sharp is a heart transplant patient currently being followed by the [facility].  We would like to request your assistance in obtaining the following laboratory tests which are part of our routine surveillance program for heart transplant recipients.  (Items needed are checked.)    Please fax the lab results as soon as they are available to:   Thoracic Transplant Department  Fax Number:  [phone]     Item Frequency   X Basic metabolic panel (including:  BUN,   Serum Creatinine, Sodium, Potassium, Chloride,   CO2, Calcium, Magnesium, Phosphorus, and   Glucose) Once in 2022 and as needed with med changes.   X Cyclosporine, Rapamune and/or tacrolimus/Prograf level  (Should be mailed to the [facility] in the  provided to you by the patient.) Once in 2022 and as needed with med changes.     Thank you  for your continued support and the opportunity to collaborate in the care of this patient.  If you have any questions, please call the Thoracic Transplant Team at [phone] or [phone].    .

## 2022-02-28 ENCOUNTER — TELEPHONE (OUTPATIENT)
Dept: ONCOLOGY | Facility: CLINIC | Age: 62
End: 2022-02-28
Payer: COMMERCIAL

## 2022-02-28 ENCOUNTER — TELEPHONE (OUTPATIENT)
Dept: ENDOCRINOLOGY | Facility: CLINIC | Age: 62
End: 2022-02-28
Payer: COMMERCIAL

## 2022-02-28 NOTE — TELEPHONE ENCOUNTER
Message sent to clinic coordinators to contact  patient and schedule follow up with Jeannette Ramirez RN on 2/28/2022 at 9:27 AM

## 2022-02-28 NOTE — PROGRESS NOTES
Outcome for 02/28/22 4:09 PM: [employer] message sent  STANTON Albrecht  Outcome for 03/02/22 10:55 AM: Reached patient but requests call back at 12   STANTON Albrecht  Outcome for 03/02/22 12:31 PM: Data obtained via phone and located below   Patient stated his meter does not let him view past individual readings. Below is the information he was able to get off his meter.    3/1:   3/2:  (took 16 units), a little later 336 but also drank milk     Last 7 days 237 Average  Last 14 days 240 Average   Last 30 days 230 Average   Last 90 days 244 Average     Ivon Irizarry, VF

## 2022-02-28 NOTE — TELEPHONE ENCOUNTER
----- Message from Jeannette Schafer PA-C sent at 2/28/2022  8:40 AM CST -----  Regarding: RE: follow up  Please schedule pt to see me.  Thanks bennett schafer  ----- Message -----  From: Angelika Muller RN  Sent: 2/23/2022   1:49 PM CST  To: Jeannette Schafer PA-C  Subject: follow up                                        Hello-    I am Red's heart transplant coordinator.  Dr. Ortiz asked that I reach out to you.  Red was supposed to follow up in Feb, but it looks like he hasn't been scheduled yet.  Would you mind seeing if he can be seen yet this month?  Dr. Ortiz is also wondering if Red might benefit from starting Farixa for his diabetes given his CKD?    Thanks for your help!  Angelika Muller, GERMAN  Post Heart Transplant Coordinator

## 2022-03-01 ENCOUNTER — TELEPHONE (OUTPATIENT)
Dept: UROLOGY | Facility: CLINIC | Age: 62
End: 2022-03-01
Payer: COMMERCIAL

## 2022-03-01 NOTE — TELEPHONE ENCOUNTER
TOÑOM and callback # for patient to schedule next available appointment with Dr. Carlos for evaluation for possible kidney stone removal

## 2022-03-04 ENCOUNTER — TELEPHONE (OUTPATIENT)
Dept: ENDOCRINOLOGY | Facility: CLINIC | Age: 62
End: 2022-03-04

## 2022-03-04 ENCOUNTER — VIRTUAL VISIT (OUTPATIENT)
Dept: ENDOCRINOLOGY | Facility: CLINIC | Age: 62
End: 2022-03-04
Payer: COMMERCIAL

## 2022-03-04 DIAGNOSIS — E11.65 TYPE 2 DIABETES MELLITUS WITH HYPERGLYCEMIA, WITH LONG-TERM CURRENT USE OF INSULIN (H): Primary | ICD-10-CM

## 2022-03-04 DIAGNOSIS — Z79.4 TYPE 2 DIABETES MELLITUS WITH HYPERGLYCEMIA, WITH LONG-TERM CURRENT USE OF INSULIN (H): Primary | ICD-10-CM

## 2022-03-04 DIAGNOSIS — Z94.1 STATUS POST HEART TRANSPLANTATION (H): ICD-10-CM

## 2022-03-04 PROCEDURE — 99215 OFFICE O/P EST HI 40 MIN: CPT | Mod: GT | Performed by: PHYSICIAN ASSISTANT

## 2022-03-04 RX ORDER — INSULIN LISPRO 100 [IU]/ML
INJECTION, SOLUTION INTRAVENOUS; SUBCUTANEOUS
Qty: 63 ML | Refills: 2 | COMMUNITY
Start: 2022-03-04 | End: 2022-05-08

## 2022-03-04 RX ORDER — DAPAGLIFLOZIN 10 MG/1
10 TABLET, FILM COATED ORAL DAILY
Qty: 90 TABLET | Refills: 3 | Status: SHIPPED | OUTPATIENT
Start: 2022-03-04 | End: 2023-03-07

## 2022-03-04 NOTE — PROGRESS NOTES
Red is a 61 year old who is being evaluated via a billable video visit.      How would you like to obtain your AVS? MyChart  If the video visit is dropped, the invitation should be resent by:   Will anyone else be joining your video visit?

## 2022-03-04 NOTE — TELEPHONE ENCOUNTER
Attempted to reach patient to schedule follow up in the Endocrinology Clinic. No answer, LM on VM to call office back.    Schedule with TATYANA Ley in 1 month (around 4/4/22). Dorothy Villeda on 3/4/2022 at 4:36 PM

## 2022-03-04 NOTE — LETTER
3/4/2022       RE: Mariusz Sharp  [address] [facility], [place] [address]     Dear Colleague,    Thank you for referring your patient, Mariusz Sharp, to the [facility] ENDOCRINOLOG[facility] [place] at [facility]. Please see a copy of my visit note below.    Outcome for 02/28/22 4:09 PM: Reflektion message sent  STANTON Albrecht  Outcome for 03/02/22 10:55 AM: Reached patient but requests call back at 12   STANTON Albrecht  Outcome for 03/02/22 12:31 PM: Data obtained via phone and located below   Patient stated his meter does not let him view past individual readings. Below is the information he was able to get off his meter.    3/1:   3/2:  (took 16 units), a little later 336 but also drank milk     Last 7 days 237 Average  Last 14 days 240 Average   Last 30 days 230 Average   Last 90 days 244 Average     STANTON Albrecht      Red is a 61 year old who is being evaluated via a billable video visit.      How would you like to obtain your AVS? Xiami Radiohart  If the video visit is dropped, the invitation should be resent by:   Will anyone else be joining your video visit?           Due to the COVID 19 pandemic this visit was converted to a video visit in order to help prevent spread of infection in this patient and the general population.    Time of start: 3:30 pm  Time of end: 3:47 pm  Total duration of video visit: 17 minutes.    CHANELL Moon ) ISABEL Sharp is a 61 year old male with type 2 diabetes mellitus. Video visit today for diabetes care.  Pt last seen in early Dec 2021.  Pt was admitted 5/17/2020-5/29/2020 and underwent a heart transplant on 5/18/2020.  Pt's hx is significant for type 2 diabetes mellitus dx 5 years ago, HTN, hyperlipidemia, hx of ischemic heart disease s/p STEMI with ALYSSA 2019, LVAD placement, Stage 3 CKD, and obesity.  Red was also admitted 5/12-5/15/2020 for ANDREW due to nephrolithiasis complicated by hydronephrosis/ANDREW.  For  his diabetes, pt is prescribed to take  Lantus 28 units subcutaneous each pm and  Humalog 1 unit/5 gms CHO with meals with correction scale  ( 1 unit/25 for BG > 150 ) and bedtime correction scale ( 1 unit/25 for BG > 200).   He admits to missing some insulin doses.  Most recent A1C was 8.6 % on 2/23/2022. Previous A1C was 9.4 % on 10/26/2021.   Pt's A1C A1C was 10.3 % on 6/23/2021.  His insurance will no longer cover his DexcomG6 sensor supplies, so he is not using his Dexcom sensor.  Apparently his insurance will not cover a Freestyle Osiris sensor.  I do not have his glucose meter download data today.  Pt tells me his 30 day average glucose is 230.  No hypoglycemia.  On ROS today, mild tingling in both feet. Denies foot ulcers/sores.  Pt denies headaches, blurred vision, n/v, SOB at rest, cough, fever, rigors, abd pain, diarrhea, dysuria or hematuria.  Pt received the COVID19 vaccines. No COVID booster yet.    Diabetes Care  Retinopathy:none; pt seen by Oph in Jan 2022 with no retinopathy per patient.  Nephropathy:yes- hx of CKD/ANDREW.  Neuropathy: mild tingling in both feet.  Foot Exam:no exam today.  Taking aspirin: yes.  Lipids:  in Oct 2021. Pt is taking Crestor.  CAD: yes; s/p heart transplant on 5/18/2020.  Depression: no.  Insulin: Basal and meal time insulin with correction scale ( 1 unit/25 for BG > 150 with meals and > 200 at bedtime).  DM meds: none at this time; adding Farxiga today.  Testing: glucose meter.  Pt has a DexcomG6 sensor - not using sensor since his insurance will not cover his Dexcom supplies. Insurance will not cover Freestyle Osiris sensor.    ROS  See under HPI.    Allergies  No Known Allergies    Medications  Current Outpatient Medications   Medication Sig Dispense Refill     dapagliflozin (FARXIGA) 10 MG TABS tablet Take 1 tablet (10 mg) by mouth daily 90 tablet 3     HUMALOG KWIKPEN 100 UNIT/ML soln Inject 1 unit/5 gms CHO with meals and snacks, plus correction ( 1 unit/25 for  BG > 140 ). Pt uses approx 80 units in 24 hrs. 63 mL 2     insulin glargine (LANTUS PEN) 100 UNIT/ML pen Inject 32 units subcutaneous at hs. 15 mL 3     ACCU-CHEK CHARLOTTE PLUS test strip Check BG 3 times daily at meals and at bedtime. 400 strip 3     acetaminophen (TYLENOL) 325 MG tablet Take 2 tablets (650 mg) by mouth every 4 hours as needed for other (multimodal surgical pain management along with NSAIDS and opioid medication as indicated based on pain control and physical function.)       amLODIPine (NORVASC) 10 MG tablet Take 0.5 tablets (5 mg) by mouth daily 90 tablet 3     BD SILVANA U/F 32G X 4 MM insulin pen needle Use 6 times daily. 600 each 3     calcium carbonate 600 mg-vitamin D 400 units (CALTRATE) 600-400 MG-UNIT per tablet Take 1 tablet by mouth 2 times daily (with meals)       everolimus (ZORTRESS) 0.5 MG tablet Take 4 tablets (2 mg) by mouth every morning AND 3 tablets (1.5 mg) every evening. 630 tablet 3     hydrALAZINE (APRESOLINE) 50 MG tablet Take 2 tablets (100 mg) by mouth 3 times daily 540 tablet 3     loperamide (IMODIUM) 2 MG capsule Take 1 capsule (2 mg) by mouth 4 times daily as needed for diarrhea (Patient not taking: Reported on 12/2/2021) 60 capsule 0     magnesium oxide (MAG-OX) 400 (241.3 Mg) MG tablet Take 1 tablet (400 mg) by mouth daily 90 tablet 3     multivitamin w/minerals (THERA-VIT-M) tablet Take 1 tablet by mouth daily 90 tablet 3     mycophenolate (GENERIC EQUIVALENT) 250 MG capsule Take 4 capsules (1,000 mg) by mouth 2 times daily 240 capsule 11     pantoprazole (PROTONIX) 40 MG EC tablet Take 1 tablet (40 mg) by mouth every morning (before breakfast) 90 tablet 3     potassium chloride ER (KLOR-CON M) 20 MEQ CR tablet Take 1 tablet (20 mEq) by mouth daily 90 tablet 3     rosuvastatin (CRESTOR) 10 MG tablet Take 1 tablet (10 mg) by mouth daily 90 tablet 3     torsemide (DEMADEX) 20 MG tablet Take 1 tablet (20 mg) by mouth daily 90 tablet 3       Family History  Mother and  father with hx of type 2 DM.  Social History   reports that he has never smoked. He has never used smokeless tobacco. He reports that he does not drink alcohol and does not use drugs.     Past Medical History  Past Medical History:   Diagnosis Date     Acute kidney injury (H)      CKD (chronic kidney disease)      Coronary artery disease      Diabetes mellitus (H)      HTN (hypertension)      Hyperlipidemia      ICD (implantable cardioverter-defibrillator), single chamber- NOT dependent 7/17/2018     Ischemic cardiomyopathy      LV (left ventricular) mural thrombus      Paroxysmal atrial flutter (H)      RVF (right ventricular failure) (H)      Systolic heart failure (H)      Ventricular tachycardia (H)        Past Surgical History:   Procedure Laterality Date     BRONCHOSCOPY (RIGID OR FLEXIBLE), DIAGNOSTIC N/A 9/15/2020    Procedure: BRONCHOSCOPY, WITH BRONCHOALVEOLAR LAVAGE;  Surgeon: Elder Mejia MD;  Location:  GI     BRONCHOSCOPY (RIGID OR FLEXIBLE), DIAGNOSTIC N/A 9/17/2020    Procedure: BRONCHOSCOPY, WITH BRONCHOALVEOLAR LAVAGE;  Surgeon: Bill Lmeus MD;  Location:  OR     BRONCHOSCOPY RIGID OR FLEXIBLE W/TRANSENDOSCOPIC ENDOBRONCHIAL ULTRASOUND GUIDED Right 9/8/2020    Procedure: BRONCHOSCOPY, WITH ENDOBRONCHIAL ULTRASOUND;  Surgeon: Donny Mercedes MD;  Location:  GI     COLONOSCOPY N/A 12/7/2018    Procedure: COLONOSCOPY;  Surgeon: Krish Mercado MD;  Location: [facility] OR     CV CORONARY ANGIOGRAM N/A 6/23/2021    Procedure: CV CORONARY ANGIOGRAM;  Surgeon: Brian Long MD;  Location:  HEART CARDIAC CATH LAB     CV HEART BIOPSY N/A 5/24/2020    Procedure: Heart Biopsy;  Surgeon: Kit Bacon MD;  Location:  HEART CARDIAC CATH LAB     CV HEART BIOPSY N/A 6/1/2020    Procedure: CV HEART BIOPSY;  Surgeon: Kit Bacon MD;  Location:  HEART CARDIAC CATH LAB     CV HEART BIOPSY N/A 6/8/2020    Procedure: CV HEART BIOPSY;  Surgeon: Jordi  Kit Pham MD;  Location: [facility] CARD[facility] C[facility] LAB     CV HEART BIOPSY N/A 6/15/2020    Procedure: CV HEART BIOPSY;  Surgeon: Kit Bacon MD;  [facility]: [facility]     CV HEART BIOPSY N/A 6/30/2020    Procedure: CV HEART BIOPSY;  Surgeon: Kit Bacon MD;  [facility]     CV HEART BIOPSY N/A 7/14/2020    Procedure: CV HEART BIOPSY;  Surgeon: Brian Long MD;  [facility]: [facility]     CV HEART BIOPSY N/A 7/29/2020    Procedure: CV HEART BIOPSY;  Surgeon: Momo Hunt MD;  Location: [facility] CARDIAC CATH LAB     CV HEART BIOPSY N/A 8/13/2020    Procedure: CV HEART BIOPSY;  Surgeon: Khris Mcclelland MD;  Location: [facility] HEART CARDIAC CATH LAB     CV HEART BIOPSY N/A 8/26/2020    Procedure: CV HEART BIOPSY;  Surgeon: Momo Hunt MD;  [facility]: [facility]     CV HEART BIOPSY N/A 9/30/2020    Procedure: CV HEART BIOPSY;  Surgeon: Artemio Ulloa MD;  Location: [facility] HEART CARDIAC CATH LAB     CV HEART BIOPSY N/A 10/29/2020    Procedure: CV HEART BIOPSY;  Surgeon: Kit Bacon MD;  Location: [facility] CARD[facility]AC CATH LAB     CV HEART BIOPSY N/A 1/5/2021    Procedure: CV HEART BIOPSY;  Surgeon: Carlin Garcia MD;  Location: [facility] CARD[facility] CATH LAB     CV HEART BIOPSY N/A 6/23/2021    Procedure: CV HEART BIOPSY;  Surgeon: Brian Long MD;  Location: [facility] HEART CARDIAC CATH LAB     CV HEART BIOPSY N/A 10/26/2021    Procedure: CV HEART BIOPSY;  Surgeon: Kit Bacon MD;  Location: [facility] HE[facility] CARDIAC CATH LAB     CV RIGHT HEART CATH MEASUREMENTS RECORDED N/A 2/19/2019    Procedure: RHC;  Surgeon: Brian Long MD;  Location:  HEART CARDIAC CATH LAB     CV RIGHT HEART CATH MEASUREMENTS RECORDED N/A 7/5/2019    Procedure: CV RIGHT HEART CATH;  Surgeon: Khris Mcclelland MD;  Location:  HEART CARDIAC CATH LAB     CV RIGHT HEART CATH  MEASUREMENTS RECORDED N/A 5/24/2020    Procedure: Right Heart Cath;  Surgeon: Kit Bacon MD;  Location:  HEART CARDIAC CATH LAB     CV RIGHT HEART CATH MEASUREMENTS RECORDED N/A 6/1/2020    Procedure: CV RIGHT HEART CATH;  Surgeon: Kit Bacon MD;  Location:  HEART CARDIAC CATH LAB     CV RIGHT HEART CATH MEASUREMENTS RECORDED N/A 6/8/2020    Procedure: CV RIGHT HEART CATH;  Surgeon: Kit Bacon MD;  Location:  HEART CARDIAC CATH LAB     CV RIGHT HEART CATH MEASUREMENTS RECORDED N/A 6/15/2020    Procedure: CV RIGHT HEART CATH;  Surgeon: Kit Bacon MD;  Location:  HEART CARDIAC CATH LAB     CV RIGHT HEART CATH MEASUREMENTS RECORDED N/A 6/30/2020    Procedure: CV RIGHT HEART CATH;  Surgeon: Kit Bacon MD;  Location:  HEART CARDIAC CATH LAB     CV RIGHT HEART CATH MEASUREMENTS RECORDED N/A 7/14/2020    Procedure: CV RIGHT HEART CATH;  Surgeon: Brian Long MD;  Location:  HEART CARDIAC CATH LAB     CV RIGHT HEART CATH MEASUREMENTS RECORDED N/A 7/29/2020    Procedure: CV RIGHT HEART CATH;  Surgeon: Momo Hunt MD;  Location:  HEART CARDIAC CATH LAB     CV RIGHT HEART CATH MEASUREMENTS RECORDED N/A 8/13/2020    Procedure: CV RIGHT HEART CATH;  Surgeon: Khris Mcclelland MD;  Location:  HEART CARDIAC CATH LAB     CV RIGHT HEART CATH MEASUREMENTS RECORDED N/A 8/26/2020    Procedure: CV RIGHT HEART CATH;  Surgeon: Momo Hunt MD;  Location:  HEART CARDIAC CATH LAB     CV RIGHT HEART CATH MEASUREMENTS RECORDED N/A 9/30/2020    Procedure: Right Heart Cath;  Surgeon: Artemio Ulloa MD;  Location:  HEART CARDIAC CATH LAB     CV RIGHT HEART CATH MEASUREMENTS RECORDED N/A 10/29/2020    Procedure: CV RIGHT HEART CATH;  Surgeon: Kit Bacon MD;  Location:  HEART CARDIAC CATH LAB     CV RIGHT HEART CATH MEASUREMENTS RECORDED N/A 1/5/2021    Procedure: CV RIGHT  HEART CATH;  Surgeon: Carlin Garcia MD;  Location:  HEART CARDIAC CATH LAB     CV RIGHT HEART CATH MEASUREMENTS RECORDED N/A 6/23/2021    Procedure: CV RIGHT HEART CATH;  Surgeon: Brian Long MD;  Location:  HEART CARDIAC CATH LAB     CV RIGHT HEART CATH MEASUREMENTS RECORDED N/A 10/26/2021    Procedure: CV RIGHT HEART CATH;  Surgeon: Kit Bacon MD;  Location:  HEART CARDIAC CATH LAB     ENDOBRONCHIAL ULTRASOUND FLEXIBLE N/A 9/17/2020    Procedure: BRONCHOSCOPY, flexible, endobronchial ultrasound with transbronchial biopsies, endobronchial biopsies;  Surgeon: Bill Lemus MD;  Location: [facility] OR     HC PRQ TRLUML CORONARY ANGIOPLASTY ADDL BRANCH      PCI and ALYSSA to ostial LAD on 6/27/18     IMPLANT AUTOMATIC IMPLANTABLE CARDIOVERTER DEFIBRILLATOR       INSERT VENTRICULAR ASSIST DEVICE LEFT (HEARTMATE II) N/A 12/31/2018    Procedure: Median Sternotomy, On Cardiopulmonary Bypass Pump, Insertion of Left Ventricular Assist Device (Heartmate III);  Surgeon: Kamaljit Baker MD;  Location: [facility] OR     PICC DOUBLE LUMEN PLACEMENT Right 05/22/2020    5FR DL PICC inserted at right basilic vein, length 38cm.     TRANSPLANT HEART RECIPIENT AFTER HEART MATE N/A 5/18/2020    Procedure: REDO MEDIAN STERNOTOMY, LYSIS OF ADHESIONS, ON CARDIOPULMONARY BYPASS PUMP, HEART TRANSPLANT RECIPIENT, REMOVAL OF LEFT VENTRICULAR ASSIST DEVICE, REMOVAL OF AUTOMATED IMPLANTABLE CARDIOVERTER DEFIBRILLATOR;  Surgeon: Elder Willis MD;  Location: [facility] OR       Physical Exam    No exam today.    RESULTS  Creatinine   Date Value Ref Range Status   02/23/2022 1.89 (H) 0.66 - 1.25 mg/dL Final   06/23/2021 1.93 (H) 0.66 - 1.25 mg/dL Final     GFR Estimate   Date Value Ref Range Status   02/23/2022 40 (L) >60 mL/min/1.73m2 Final     Comment:     Effective December 21, 2021 eGFRcr in adults is calculated using the 2021 CKD-EPI creatinine equation which includes age and gender (Inga et al., NEJ, DOI:  10.1056/HKYUdd2947121)   06/23/2021 37 (L) >60 mL/min/[1.73_m2] Final     Comment:     Non  GFR Calc  Starting 12/18/2018, serum creatinine based estimated GFR (eGFR) will be   calculated using the Chronic Kidney Disease Epidemiology Collaboration   (CKD-EPI) equation.       Hemoglobin A1C POCT   Date Value Ref Range Status   06/23/2021 10.3 (H) 0 - 5.6 % Final     Comment:     Normal <5.7% Prediabetes 5.7-6.4%  Diabetes 6.5% or higher - adopted from ADA   consensus guidelines.       Hemoglobin A1C   Date Value Ref Range Status   02/23/2022 8.6 (H) 0.0 - 5.6 % Final     Comment:     Normal <5.7%   Prediabetes 5.7-6.4%    Diabetes 6.5% or higher     Note: Adopted from ADA consensus guidelines.     Potassium   Date Value Ref Range Status   02/23/2022 3.5 3.4 - 5.3 mmol/L Final   06/23/2021 3.6 3.4 - 5.3 mmol/L Final     ALT   Date Value Ref Range Status   10/26/2021 34 0 - 70 U/L Final   06/23/2021 26 0 - 70 U/L Final     AST   Date Value Ref Range Status   10/26/2021 27 0 - 45 U/L Final   06/23/2021 22 0 - 45 U/L Final     TSH   Date Value Ref Range Status   03/03/2021 1.87 0.40 - 4.00 mU/L Final     T4 Free   Date Value Ref Range Status   03/03/2021 1.08 0.76 - 1.46 ng/dL Final       Cholesterol   Date Value Ref Range Status   10/26/2021 205 (H) <200 mg/dL Final   06/23/2021 168 <200 mg/dL Final   10/29/2020 161 <200 mg/dL Final     HDL Cholesterol   Date Value Ref Range Status   06/23/2021 49 >39 mg/dL Final   10/29/2020 51 >39 mg/dL Final     Direct Measure HDL   Date Value Ref Range Status   10/26/2021 40 >=40 mg/dL Final     LDL Cholesterol Calculated   Date Value Ref Range Status   10/26/2021   Final     Comment:     Cannot estimate LDL when triglyceride exceeds 400 mg/dL   06/23/2021 75 <100 mg/dL Final     Comment:     Desirable:       <100 mg/dl   10/29/2020 81 <100 mg/dL Final     Comment:     Desirable:       <100 mg/dl     LDL Cholesterol Direct   Date Value Ref Range Status   10/26/2021  105 (H) <100 mg/dL Final     Comment:     Age 0-19 years:  Desirable: 0-110 mg/dL   Borderline high: 110-129 mg/dL   High: >= 130 mg/dL    Age 20 years and older:  Desirable: <100mg/dL  Above desirable: 100-129 mg/dL   Borderline high: 130-159 mg/dL   High: 160-189 mg/dL   Very high: >= 190 mg/dL     Triglycerides   Date Value Ref Range Status   10/26/2021 451 (H) <150 mg/dL Final   06/23/2021 220 (H) <150 mg/dL Final     Comment:     Borderline high:  150-199 mg/dl  High:             200-499 mg/dl  Very high:       >499 mg/dl     10/29/2020 144 <150 mg/dL Final       ASSESSMENT/PLAN:      1.  TYPE 2 DIABETES MELLITUS: Uncontrolled type 2 diabetes mellitus.  Pt instructed to take insulin as prescribed and to take insulin for ALL food intake including snacks BEFORE eating.  Increase Lantus 32 units subcutaneous at hs and continue Humalog 1 unit/5 gms CHO with meals and snacks,plus correction insulin 1 unit/25 for BG > 140 with meals and > 200 at bedtime. I reviewed his correction scale with him.   Add Farxiga 10 mg daily.  Reviewed how Farxiga works and possible side effects including dehydration, yeast infection and possible UTI. Will need to recheck creat/GFR.  Avoid use of Metformin due to CKD and lower GFR.  Pt is unable to afford his DexcomG6 sensor supplies and apparently insurance will not cover a Freestyle Osiris sensor.  Pt was seen by Oph in Jan 2022 without retinopathy per patient.  He reports mild tingling in his feet.  No foot ulcers at this time.  He is taking ASA daily.  Red received the COVID19 vaccines (Pfizer). No COVID booster yet.  Reminded him to get the flu vaccine this season.    2.  CKD/ANDREW: Creat 1.89 with GFR 40 mL/min on 2/23/2022.  Avoid use of Metformin.    3.  HX OF ISCHEMIC CARDIOMYOPATHY: S/P heart transplant on 5/18/2020 and followed closely by transplant staff here.    4.  HTN: Reminded him to check his BP at home for the Cardiology/transplant staff.  Continue current RXs.    5.   WEIGHT GAIN: He has gained weight. Reminded him to make healthy food choices, reduce food portions with meals, try to avoid snacking and increase activity.     6  FOLLOW UP : with me in 1 month.  Pt has creat/GFR ordered.    Time spent reviewing chart, labs and glucose data today = 8 minutes.  Time for video visit today = 17  minutes.  Total time for documentation today = 15 minutes.    TOTAL TIME FOR VIDEO VISIT TODAY= 40  minutes.    Jeannette Schafer PA-C

## 2022-03-05 ENCOUNTER — TELEPHONE (OUTPATIENT)
Dept: TRANSPLANT | Facility: CLINIC | Age: 62
End: 2022-03-05
Payer: COMMERCIAL

## 2022-03-05 DIAGNOSIS — Z94.1 STATUS POST HEART TRANSPLANTATION (H): Primary | ICD-10-CM

## 2022-03-05 NOTE — LETTER
[facility] [phone] (Phone)  [phone] (Fax)       2022    Patient Name: Mariusz Sharp   :  1960   MRN: [id number]  ICD10:  z94.1 , z79.899    Subject: Request for Labs    Mariusz Sharp is a heart transplant patient currently being followed by the [facility].  We would like to request your assistance in obtaining the following laboratory tests which are part of our routine surveillance program for heart transplant recipients.  (Items needed are checked.)    Please fax the lab results as soon as they are available to:   Thoracic Transplant Department  Fax Number:  [phone]     Item Frequency   X Basic metabolic panel (including:  BUN,   Serum Creatinine, Sodium, Potassium, Chloride,   CO2, Calcium, Magnesium, Phosphorus, and   Glucose) Once in 2022 and as needed with med changes.     Thank you  for your continued support and the opportunity to collaborate in the care of this patient.  If you have any questions, please call the Thoracic Transplant Team at [phone] or [phone].    .

## 2022-03-05 NOTE — TELEPHONE ENCOUNTER
Pt called yesterday to report he was stared on Farxiga by Endocrine.  Farxiga can cause increased urination, and Red is wondering if he should continue taking Toresemide 20mg in conjunction with Farxiga.  Reviewed above with Dr. Ortiz.  Pt to continue both medications and have labs checked next week.  If pt develops dizziness or lightheadedness- plan to hold Toresemide dose.  Pt states understanding above instructions.  Orders faxed to [facility] in [place].

## 2022-03-06 NOTE — PROGRESS NOTES
Due to the COVID 19 pandemic this visit was converted to a video visit in order to help prevent spread of infection in this patient and the general population.    Time of start: 3:30 pm  Time of end: 3:47 pm  Total duration of video visit: 17 minutes.    CHANELL Sharp ( Pepe ) is a 61 year old male with type 2 diabetes mellitus. Video visit today for diabetes care.  Pt last seen in early Dec 2021.  Pt was admitted 5/17/2020-5/29/2020 and underwent a heart transplant on 5/18/2020.  Pt's hx is significant for type 2 diabetes mellitus dx 5 years ago, HTN, hyperlipidemia, hx of ischemic heart disease s/p STEMI with ALYSSA 2019, LVAD placement, Stage 3 CKD, and obesity.  Red was also admitted 5/12-5/15/2020 for ANDREW due to nephrolithiasis complicated by hydronephrosis/ANDREW.  For his diabetes, pt is prescribed to take  Lantus 28 units subcutaneous each pm and  Humalog 1 unit/5 gms CHO with meals with correction scale  ( 1 unit/25 for BG > 150 ) and bedtime correction scale ( 1 unit/25 for BG > 200).   He admits to missing some insulin doses.  Most recent A1C was 8.6 % on 2/23/2022. Previous A1C was 9.4 % on 10/26/2021.   Pt's A1C A1C was 10.3 % on 6/23/2021.  His insurance will no longer cover his DexcomG6 sensor supplies, so he is not using his Dexcom sensor.  Apparently his insurance will not cover a Freestyle Osiris sensor.  I do not have his glucose meter download data today.  Pt tells me his 30 day average glucose is 230.  No hypoglycemia.  On ROS today, mild tingling in both feet. Denies foot ulcers/sores.  Pt denies headaches, blurred vision, n/v, SOB at rest, cough, fever, rigors, abd pain, diarrhea, dysuria or hematuria.  Pt received the COVID19 vaccines. No COVID booster yet.    Diabetes Care  Retinopathy:none; pt seen by Oph in Jan 2022 with no retinopathy per patient.  Nephropathy:yes- hx of CKD/ANDREW.  Neuropathy: mild tingling in both feet.  Foot Exam:no exam today.  Taking aspirin: yes.  Lipids:  in  Oct 2021. Pt is taking Crestor.  CAD: yes; s/p heart transplant on 5/18/2020.  Depression: no.  Insulin: Basal and meal time insulin with correction scale ( 1 unit/25 for BG > 150 with meals and > 200 at bedtime).  DM meds: none at this time; adding Farxiga today.  Testing: glucose meter.  Pt has a DexcomG6 sensor - not using sensor since his insurance will not cover his Dexcom supplies. Insurance will not cover Freestyle Osiris sensor.    ROS  See under HPI.    Allergies  No Known Allergies    Medications  Current Outpatient Medications   Medication Sig Dispense Refill     dapagliflozin (FARXIGA) 10 MG TABS tablet Take 1 tablet (10 mg) by mouth daily 90 tablet 3     HUMALOG KWIKPEN 100 UNIT/ML soln Inject 1 unit/5 gms CHO with meals and snacks, plus correction ( 1 unit/25 for BG > 140 ). Pt uses approx 80 units in 24 hrs. 63 mL 2     insulin glargine (LANTUS PEN) 100 UNIT/ML pen Inject 32 units subcutaneous at hs. 15 mL 3     ACCU-CHEK [place] PLUS test strip Check BG 3 times daily at meals and at bedtime. 400 strip 3     acetaminophen (TYLENOL) 325 MG tablet Take 2 tablets (650 mg) by mouth every 4 hours as needed for other (multimodal surgical pain management along with NSAIDS and opioid medication as indicated based on pain control and physical function.)       amLODIPine (NORVASC) 10 MG tablet Take 0.5 tablets (5 mg) by mouth daily 90 tablet 3     BD SILVANA U/F 32G X 4 MM insulin pen needle Use 6 times daily. 600 each 3     calcium carbonate 600 mg-vitamin D 400 units (CALTRATE) 600-400 MG-UNIT per tablet Take 1 tablet by mouth 2 times daily (with meals)       everolimus (ZORTRESS) 0.5 MG tablet Take 4 tablets (2 mg) by mouth every morning AND 3 tablets (1.5 mg) every evening. 630 tablet 3     hydrALAZINE (APRESOLINE) 50 MG tablet Take 2 tablets (100 mg) by mouth 3 times daily 540 tablet 3     loperamide (IMODIUM) 2 MG capsule Take 1 capsule (2 mg) by mouth 4 times daily as needed for diarrhea (Patient not taking:  Reported on 12/2/2021) 60 capsule 0     magnesium oxide (MAG-OX) 400 (241.3 Mg) MG tablet Take 1 tablet (400 mg) by mouth daily 90 tablet 3     multivitamin w/minerals (THERA-VIT-M) tablet Take 1 tablet by mouth daily 90 tablet 3     mycophenolate (GENERIC EQUIVALENT) 250 MG capsule Take 4 capsules (1,000 mg) by mouth 2 times daily 240 capsule 11     pantoprazole (PROTONIX) 40 MG EC tablet Take 1 tablet (40 mg) by mouth every morning (before breakfast) 90 tablet 3     potassium chloride ER (KLOR-CON M) 20 MEQ CR tablet Take 1 tablet (20 mEq) by mouth daily 90 tablet 3     rosuvastatin (CRESTOR) 10 MG tablet Take 1 tablet (10 mg) by mouth daily 90 tablet 3     torsemide (DEMADEX) 20 MG tablet Take 1 tablet (20 mg) by mouth daily 90 tablet 3       Family History  Mother and father with hx of type 2 DM.  Social History   reports that he has never smoked. He has never used smokeless tobacco. He reports that he does not drink alcohol and does not use drugs.     Past Medical History  Past Medical History:   Diagnosis Date     Acute kidney injury (H)      CKD (chronic kidney disease)      Coronary artery disease      Diabetes mellitus (H)      HTN (hypertension)      Hyperlipidemia      ICD (implantable cardioverter-defibrillator), single chamber- NOT dependent 7/17/2018     Ischemic cardiomyopathy      LV (left ventricular) mural thrombus      Paroxysmal atrial flutter (H)      RVF (right ventricular failure) (H)      Systolic heart failure (H)      Ventricular tachycardia (H)        Past Surgical History:   Procedure Laterality Date     BRONCHOSCOPY (RIGID OR FLEXIBLE), DIAGNOSTIC N/A 9/15/2020    Procedure: BRONCHOSCOPY, WITH BRONCHOALVEOLAR LAVAGE;  Surgeon: Elder Mejia MD;  Location:  GI     BRONCHOSCOPY (RIGID OR FLEXIBLE), DIAGNOSTIC N/A 9/17/2020    Procedure: BRONCHOSCOPY, WITH BRONCHOALVEOLAR LAVAGE;  Surgeon: Bill Lemus MD;  Location: [facility]     BRONCHOSCOPY RIGID OR FLEXIBLE W/TRANSENDOSCOPIC  ENDOBRONCHIAL ULTRASOUND GUIDED Right 9/8/2020    Procedure: BRONCHOSCOPY, WITH ENDOBRONCHIAL ULTRASOUND;  Surgeon: Donny Mercedes MD;  Location:  GI     COLONOSCOPY N/A 12/7/2018    Procedure: COLONOSCOPY;  Surgeon: Krish Mercado MD;  Location: [facility]     CV CORONARY ANGIOGRAM N/A 6/23/2021    Procedure: CV CORONARY ANGIOGRAM;  Surgeon: Brian Long MD;  Location:  HEART CARDIAC CATH LAB     CV HEART BIOPSY N/A 5/24/2020    Procedure: Heart Biopsy;  Surgeon: Kit Bacon MD;  Location:  HEART CARDIAC CATH LAB     CV HEART BIOPSY N/A 6/1/2020    Procedure: CV HEART BIOPSY;  Surgeon: Kit Bacon MD;  Location:  HEART CARDIAC CATH LAB     CV HEART BIOPSY N/A 6/8/2020    Procedure: CV HEART BIOPSY;  Surgeon: Kit Bacon MD;  Location:  HEART CARDIAC CATH LAB     CV HEART BIOPSY N/A 6/15/2020    Procedure: CV HEART BIOPSY;  Surgeon: Kit Bacon MD;  Location:  HEART CARDIAC CATH LAB     CV HEART BIOPSY N/A 6/30/2020    Procedure: CV HEART BIOPSY;  Surgeon: Kit Bacon MD;  Location:  HEART CARDIAC CATH LAB     CV HEART BIOPSY N/A 7/14/2020    Procedure: CV HEART BIOPSY;  Surgeon: Brian Long MD;  Location:  HEART CARDIAC CATH LAB     CV HEART BIOPSY N/A 7/29/2020    Procedure: CV HEART BIOPSY;  Surgeon: Momo Hunt MD;  Location:  HEART CARDIAC CATH LAB     CV HEART BIOPSY N/A 8/13/2020    Procedure: CV HEART BIOPSY;  Surgeon: Khris Mcclelland MD;  [facility]     CV HEART BIOPSY N/A 8/26/2020    Procedure: CV HEART BIOPSY;  Surgeon: Momo Hunt MD;  Location:  HEART CARDIAC CATH LAB     CV HEART BIOPSY N/A 9/30/2020    Procedure: CV HEART BIOPSY;  Surgeon: Artemio Ulloa MD;  Location:  HEART CARDIAC CATH LAB     CV HEART BIOPSY N/A 10/29/2020    Procedure: CV HEART BIOPSY;  Surgeon: Kit Bacon MD;  Location:   HEART CARDIAC CATH LAB     CV HEART BIOPSY N/A 1/5/2021    Procedure: CV HEART BIOPSY;  Surgeon: Carlin Garcia MD;  [facility]     CV HEART BIOPSY N/A 6/23/2021    Procedure: CV HEART BIOPSY;  Surgeon: Brian Long MD;  Location:  HEART CARDIAC CATH LAB     CV HEART BIOPSY N/A 10/26/2021    Procedure: CV HEART BIOPSY;  Surgeon: Kit Bacon MD;  Location:  HEART CARDIAC CATH LAB     CV RIGHT HEART CATH MEASUREMENTS RECORDED N/A 2/19/2019    Procedure: RHC;  Surgeon: Brian Long MD;  Location:  HEART CARDIAC CATH LAB     CV RIGHT HEART CATH MEASUREMENTS RECORDED N/A 7/5/2019    Procedure: CV RIGHT HEART CATH;  Surgeon: Khris Mcclelland MD;  Location:  HEART CARDIAC CATH LAB     CV RIGHT HEART CATH MEASUREMENTS RECORDED N/A 5/24/2020    Procedure: Right Heart Cath;  Surgeon: Kit Bacon MD;  Location:  HEART CARDIAC CATH LAB     CV RIGHT HEART CATH MEASUREMENTS RECORDED N/A 6/1/2020    Procedure: CV RIGHT HEART CATH;  Surgeon: Kit Bacon MD;  Location:  HEART CARDIAC CATH LAB     CV RIGHT HEART CATH MEASUREMENTS RECORDED N/A 6/8/2020    Procedure: CV RIGHT HEART CATH;  Surgeon: Kit Bacon MD;  Location:  HEART CARDIAC CATH LAB     CV RIGHT HEART CATH MEASUREMENTS RECORDED N/A 6/15/2020    Procedure: CV RIGHT HEART CATH;  Surgeon: Kit Bacon MD;  Location:  HEART CARDIAC CATH LAB     CV RIGHT HEART CATH MEASUREMENTS RECORDED N/A 6/30/2020    Procedure: CV RIGHT HEART CATH;  Surgeon: Kit Bacon MD;  Location:  HEART CARDIAC CATH LAB     CV RIGHT HEART CATH MEASUREMENTS RECORDED N/A 7/14/2020    Procedure: CV RIGHT HEART CATH;  Surgeon: Brian Long MD;  Location:  HEART CARDIAC CATH LAB     CV RIGHT HEART CATH MEASUREMENTS RECORDED N/A 7/29/2020    Procedure: CV RIGHT HEART CATH;  Surgeon: Momo Hunt MD;  Location:   HEART CARDIAC CATH LAB     CV RIGHT HEART CATH MEASUREMENTS RECORDED N/A 8/13/2020    Procedure: CV RIGHT HEART CATH;  Surgeon: Khris Mcclelland MD;  Location:  HEART CARDIAC CATH LAB     CV RIGHT HEART CATH MEASUREMENTS RECORDED N/A 8/26/2020    Procedure: CV RIGHT HEART CATH;  Surgeon: Momo Hunt MD;  Location:  HEART CARDIAC CATH LAB     CV RIGHT HEART CATH MEASUREMENTS RECORDED N/A 9/30/2020    Procedure: Right Heart Cath;  Surgeon: Artemio Ulloa MD;  Location:  HEART CARDIAC CATH LAB     CV RIGHT HEART CATH MEASUREMENTS RECORDED N/A 10/29/2020    Procedure: CV RIGHT HEART CATH;  Surgeon: Kit Bacon MD;  Location:  HEART CARDIAC CATH LAB     CV RIGHT HEART CATH MEASUREMENTS RECORDED N/A 1/5/2021    Procedure: CV RIGHT HEART CATH;  Surgeon: Carlin Garcia MD;  Location:  HEART CARDIAC CATH LAB     CV RIGHT HEART CATH MEASUREMENTS RECORDED N/A 6/23/2021    Procedure: CV RIGHT HEART CATH;  Surgeon: Brian Long MD;  Location:  HEART CARDIAC CATH LAB     CV RIGHT HEART CATH MEASUREMENTS RECORDED N/A 10/26/2021    Procedure: CV RIGHT HEART CATH;  Surgeon: Kit Bacon MD;  Location:  HEART CARDIAC CATH LAB     ENDOBRONCHIAL ULTRASOUND FLEXIBLE N/A 9/17/2020    Procedure: BRONCHOSCOPY, flexible, endobronchial ultrasound with transbronchial biopsies, endobronchial biopsies;  Surgeon: Bill Lemus MD;  Location:  OR      PRQ TRLUML CORONARY ANGIOPLASTY ADDL BRANCH      PCI and ALYSSA to ostial LAD on 6/27/18     IMPLANT AUTOMATIC IMPLANTABLE CARDIOVERTER DEFIBRILLATOR       INSERT VENTRICULAR ASSIST DEVICE LEFT (HEARTMATE II) N/A 12/31/2018    Procedure: Median Sternotomy, On Cardiopulmonary Bypass Pump, Insertion of Left Ventricular Assist Device (Heartmate III);  Surgeon: Kamaljit Baker MD;  Location: [facility]     PICC DOUBLE LUMEN PLACEMENT Right 05/22/2020    5FR DL PICC inserted at right basilic vein, length 38cm.     TRANSPLANT  HEART RECIPIENT AFTER HEART MATE N/A 5/18/2020    Procedure: REDO MEDIAN STERNOTOMY, LYSIS OF ADHESIONS, ON CARDIOPULMONARY BYPASS PUMP, HEART TRANSPLANT RECIPIENT, REMOVAL OF LEFT VENTRICULAR ASSIST DEVICE, REMOVAL OF AUTOMATED IMPLANTABLE CARDIOVERTER DEFIBRILLATOR;  Surgeon: Elder Willis MD;  Location: [facility]       Physical Exam    No exam today.    RESULTS  Creatinine   Date Value Ref Range Status   02/23/2022 1.89 (H) 0.66 - 1.25 mg/dL Final   06/23/2021 1.93 (H) 0.66 - 1.25 mg/dL Final     GFR Estimate   Date Value Ref Range Status   02/23/2022 40 (L) >60 mL/min/1.73m2 Final     Comment:     Effective December 21, 2021 eGFRcr in adults is calculated using the 2021 CKD-EPI creatinine equation which includes age and gender (Inga et al., NE, DOI: 10.1056/WATJlj1811960)   06/23/2021 37 (L) >60 mL/min/[1.73_m2] Final     Comment:     Non  GFR Calc  Starting 12/18/2018, serum creatinine based estimated GFR (eGFR) will be   calculated using the Chronic Kidney Disease Epidemiology Collaboration   (CKD-EPI) equation.       Hemoglobin A1C POCT   Date Value Ref Range Status   06/23/2021 10.3 (H) 0 - 5.6 % Final     Comment:     Normal <5.7% Prediabetes 5.7-6.4%  Diabetes 6.5% or higher - adopted from ADA   consensus guidelines.       Hemoglobin A1C   Date Value Ref Range Status   02/23/2022 8.6 (H) 0.0 - 5.6 % Final     Comment:     Normal <5.7%   Prediabetes 5.7-6.4%    Diabetes 6.5% or higher     Note: Adopted from ADA consensus guidelines.     Potassium   Date Value Ref Range Status   02/23/2022 3.5 3.4 - 5.3 mmol/L Final   06/23/2021 3.6 3.4 - 5.3 mmol/L Final     ALT   Date Value Ref Range Status   10/26/2021 34 0 - 70 U/L Final   06/23/2021 26 0 - 70 U/L Final     AST   Date Value Ref Range Status   10/26/2021 27 0 - 45 U/L Final   06/23/2021 22 0 - 45 U/L Final     TSH   Date Value Ref Range Status   03/03/2021 1.87 0.40 - 4.00 mU/L Final     T4 Free   Date Value Ref Range Status    03/03/2021 1.08 0.76 - 1.46 ng/dL Final       Cholesterol   Date Value Ref Range Status   10/26/2021 205 (H) <200 mg/dL Final   06/23/2021 168 <200 mg/dL Final   10/29/2020 161 <200 mg/dL Final     HDL Cholesterol   Date Value Ref Range Status   06/23/2021 49 >39 mg/dL Final   10/29/2020 51 >39 mg/dL Final     Direct Measure HDL   Date Value Ref Range Status   10/26/2021 40 >=40 mg/dL Final     LDL Cholesterol Calculated   Date Value Ref Range Status   10/26/2021   Final     Comment:     Cannot estimate LDL when triglyceride exceeds 400 mg/dL   06/23/2021 75 <100 mg/dL Final     Comment:     Desirable:       <100 mg/dl   10/29/2020 81 <100 mg/dL Final     Comment:     Desirable:       <100 mg/dl     LDL Cholesterol Direct   Date Value Ref Range Status   10/26/2021 105 (H) <100 mg/dL Final     Comment:     Age 0-19 years:  Desirable: 0-110 mg/dL   Borderline high: 110-129 mg/dL   High: >= 130 mg/dL    Age 20 years and older:  Desirable: <100mg/dL  Above desirable: 100-129 mg/dL   Borderline high: 130-159 mg/dL   High: 160-189 mg/dL   Very high: >= 190 mg/dL     Triglycerides   Date Value Ref Range Status   10/26/2021 451 (H) <150 mg/dL Final   06/23/2021 220 (H) <150 mg/dL Final     Comment:     Borderline high:  150-199 mg/dl  High:             200-499 mg/dl  Very high:       >499 mg/dl     10/29/2020 144 <150 mg/dL Final       ASSESSMENT/PLAN:      1.  TYPE 2 DIABETES MELLITUS: Uncontrolled type 2 diabetes mellitus.  Pt instructed to take insulin as prescribed and to take insulin for ALL food intake including snacks BEFORE eating.  Increase Lantus 32 units subcutaneous at hs and continue Humalog 1 unit/5 gms CHO with meals and snacks,plus correction insulin 1 unit/25 for BG > 140 with meals and > 200 at bedtime. I reviewed his correction scale with him.   Add Farxiga 10 mg daily.  Reviewed how Farxiga works and possible side effects including dehydration, yeast infection and possible UTI. Will need to recheck  creat/GFR.  Avoid use of Metformin due to CKD and lower GFR.  Pt is unable to afford his DexcomG6 sensor supplies and apparently insurance will not cover a Freestyle Osiris sensor.  Pt was seen by Oph in Jan 2022 without retinopathy per patient.  He reports mild tingling in his feet.  No foot ulcers at this time.  He is taking ASA daily.  Red received the COVID19 vaccines (Pfizer). No COVID booster yet.  Reminded him to get the flu vaccine this season.    2.  CKD/ANDREW: Creat 1.89 with GFR 40 mL/min on 2/23/2022.  Avoid use of Metformin.    3.  HX OF ISCHEMIC CARDIOMYOPATHY: S/P heart transplant on 5/18/2020 and followed closely by transplant staff here.    4.  HTN: Reminded him to check his BP at home for the Cardiology/transplant staff.  Continue current RXs.    5.  WEIGHT GAIN: He has gained weight. Reminded him to make healthy food choices, reduce food portions with meals, try to avoid snacking and increase activity.     6  FOLLOW UP : with me in 1 month.  Pt has creat/GFR ordered.    Time spent reviewing chart, labs and glucose data today = 8 minutes.  Time for video visit today = 17  minutes.  Total time for documentation today = 15 minutes.    TOTAL TIME FOR VIDEO VISIT TODAY= 40  minutes.    Jeannette Schafer PA-C

## 2022-03-14 ENCOUNTER — TELEPHONE (OUTPATIENT)
Dept: CARDIOLOGY | Facility: CLINIC | Age: 62
End: 2022-03-14
Payer: COMMERCIAL

## 2022-03-14 NOTE — TELEPHONE ENCOUNTER
Recent labs reviewed with Dr. Ortiz. Due to bump in Cr = 2.3. It was decided to hold torsemide for a week, repeat labs and see how patient feels. Pt called to discuss, no answer VM left requesting a call back.

## 2022-03-15 ENCOUNTER — TELEPHONE (OUTPATIENT)
Dept: TRANSPLANT | Facility: CLINIC | Age: 62
End: 2022-03-15
Payer: COMMERCIAL

## 2022-03-15 NOTE — TELEPHONE ENCOUNTER
Called to review medication instructions. Red received message and will hold torsemide for one week, then repeat BMP.  Will call with questions/concerns.

## 2022-03-16 ENCOUNTER — VIRTUAL VISIT (OUTPATIENT)
Dept: UROLOGY | Facility: CLINIC | Age: 62
End: 2022-03-16
Payer: COMMERCIAL

## 2022-03-16 DIAGNOSIS — N20.0 KIDNEY STONE: Primary | ICD-10-CM

## 2022-03-16 PROCEDURE — 99214 OFFICE O/P EST MOD 30 MIN: CPT | Mod: 95 | Performed by: UROLOGY

## 2022-03-16 NOTE — PROGRESS NOTES
ASSESSMENT and PLAN  Left kidney stone 3cm.  On surveillance since 2020.  Given this stone size, there is risk of renal damage.    Elevated PSA    Dr Gomes is following    The following are complicating factors.  These increase the risk of perioperative complications and make treatment more complicated.    Heart transplant    Stage 3 renal failure.  His creatinine for the most stable over the last 2 year.    Plan    CT Abdomen/Pelvis without contrast to evaluate kidney stone.    Follow-up video visit in 2-6 weeks.  _________________________________________________________________    HPI  He is here for follow-up of his kidney stone.  There is apparently concern that this is affecting his kidney function   Mr. Sharp had a heart transplant 5/18/20.    He denies any problems from the kidney stone such as flank pain or pyelonephritis.     This is a select list of notes I reviewed during this visit.  There may be additional notes I reviewed which are not listed:    6/23/21 Dr Ortiz Cardiology     6/20/20 CT Abdomen/Pelvis without contrast   I reviewed the radiologic images and report from this radiologic exam.  My independent interpretation is:    Large left kidney stone.    Radiologist Impression  1. Grossly unchanged 2.0 x 2.8 cm stone in the left renal pelvis  associated with mild left-sided pelvocaliectasis. Right kidney is  unremarkable.  2. Surgical incision in the right mid hemiabdomen with air foci  tracking along the right abdominal and lower chest wall, likely  related to patient's recent surgery.        I reviewed the following laboratory data and went over findings with patient:  Recent Labs   Lab Test 02/23/22  1104 10/26/21  1614 10/26/21  1027 06/23/21  1100 06/23/21  1059 06/23/21  0654 03/03/21  0858   WBC 6.3  --  6.3  --   --  5.3 6.4   HGB 12.0* 11.0* 11.5* 10.1*   < > 11.1* 10.7*     --  263  --   --  247 258    < > = values in this interval not displayed.     Recent Labs   Lab Test  02/23/22  1104 10/26/21  1027 06/23/21  0654 03/03/21  0858 01/05/21  0832 10/29/20  0955   CR 1.89* 2.04* 1.93* 2.09* 2.01* 1.89*   GFRESTIMATED 40* 35* 37* 34* 35* 38*   GFRESTBLACK  --   --  43* 39* 41* 44*   * 248* 188* 234* 171* 157*         Video-Visit Details  Video Start Time: 308  Video End Time: 316  Time spent on pre-visit work, post visit work, and documentation on day of visit outside of time during video visit: 10 min  Total time on the day of the visit: 18 min  Originating Location (pt. Location): Home.    Distant Location (provider location):  [facility].  Platform used for Video Visit: [employer]  Patient Care Team:  Milad Fry MD as PCP - General (Family Practice)  Antonia Byrne APRN CNP as Nurse Practitioner (Cardiology)  Jenni Ortiz MD as MD (Cardiology)  Megan Ibarra MD as MD (Urology)  Nina Gutierrez RN as Specialty Care Coordinator (Urology)  Angelika Muller, RN as Transplant Coordinator (Cardiology)  Xavi Mckeon [facility] as Pharmacist (Pharmacist)  Jeannette Schafer PA-C as Physician Assistant (INTERNAL MEDICINE - ENDOCRINOLOGY, DIABETES & METABOLISM)  Tavares Rodriguez MD as Assigned Infectious Disease Provider  Jenni Ortiz MD as Assigned Heart and Vascular Provider  Jeannette Schafer PA-C as Assigned Endocrinology Provider  Donny Gomes MD as Assigned Cancer Care Provider  Kenton Carlos MD as Assigned Surgical Provider  SELF, REFERRED    Copy to patient  ADILSON RINCON  [address] [facility] [id number]

## 2022-03-16 NOTE — LETTER
3/16/2022       RE: Mariusz Sharp  [address] [facility] [id number]     Dear Colleague,    Thank you for referring your patient, Mariusz Sharp, to the [facility] UROLOGY [facility] [place] at [facility]. Please see a copy of my visit note below.    ASSESSMENT and PLAN  Left kidney stone 3cm.  On surveillance since 2020.  Given this stone size, there is risk of renal damage.    Elevated PSA    Dr Gomes is following    The following are complicating factors.  These increase the risk of perioperative complications and make treatment more complicated.    Heart transplant    Stage 3 renal failure.  His creatinine for the most stable over the last 2 year.    Plan    CT Abdomen/Pelvis without contrast to evaluate kidney stone.    Follow-up video visit in 2-6 weeks.  _________________________________________________________________    HPI  He is here for follow-up of his kidney stone.  There is apparently concern that this is affecting his kidney function   Mr. Sharp had a heart transplant 5/18/20.    He denies any problems from the kidney stone such as flank pain or pyelonephritis.     This is a select list of notes I reviewed during this visit.  There may be additional notes I reviewed which are not listed:    6/23/21 Dr Ortiz Cardiology     6/20/20 CT Abdomen/Pelvis without contrast   I reviewed the radiologic images and report from this radiologic exam.  My independent interpretation is:    Large left kidney stone.    Radiologist Impression  1. Grossly unchanged 2.0 x 2.8 cm stone in the left renal pelvis  associated with mild left-sided pelvocaliectasis. Right kidney is  unremarkable.  2. Surgical incision in the right mid hemiabdomen with air foci  tracking along the right abdominal and lower chest wall, likely  related to patient's recent surgery.        I reviewed the following laboratory data and went over findings with patient:  Recent Labs   Lab Test  02/23/22  1104 10/26/21  1614 10/26/21  1027 06/23/21  1100 06/23/21  1059 06/23/21  0654 03/03/21  0858   WBC 6.3  --  6.3  --   --  5.3 6.4   HGB 12.0* 11.0* 11.5* 10.1*   < > 11.1* 10.7*     --  263  --   --  247 258    < > = values in this interval not displayed.     Recent Labs   Lab Test 02/23/22  1104 10/26/21  1027 06/23/21  0654 03/03/21  0858 01/05/21  0832 10/29/20  0955   CR 1.89* 2.04* 1.93* 2.09* 2.01* 1.89*   GFRESTIMATED 40* 35* 37* 34* 35* 38*   GFRESTBLACK  --   --  43* 39* 41* 44*   * 248* 188* 234* 171* 157*         Video-Visit Details  Video Start Time: 308  Video End Time: 316  Time spent on pre-visit work, post visit work, and documentation on day of visit outside of time during video visit: 10 min  Total time on the day of the visit: 18 min  Originating Location (pt. Location): Home.    Distant Location (provider location):  [facility].  Platform used for Video Visit: [employer]  Patient Care Team:  Milad Fry MD as PCP - General (Family Practice)  Antonia Byrne APRN CNP as Nurse Practitioner (Cardiology)  Jenni Ortiz MD as MD (Cardiology)  Megan Ibarra MD as MD (Urology)  Nina Gutierrez RN as Specialty Care Coordinator (Urology)  Angelika Muller, RN as Transplant Coordinator (Cardiology)  Xavi Mckeon [facility] as Pharmacist (Pharmacist)  Jeannette Schafer PA-C as Physician Assistant (INTERNAL MEDICINE - ENDOCRINOLOGY, DIABETES & METABOLISM)  Tavares Rodriguez MD as Assigned Infectious Disease Provider  Jenni Ortiz MD as Assigned Heart and Vascular Provider  Jeannette Schafer PA-C as Assigned Endocrinology Provider  Donny Gomes MD as Assigned Cancer Care Provider  Kenton Carlos MD as Assigned Surgical Provider  SELF, REFERRED    Copy to patient  ADILSON RINCON  [address] [place] [id number]      Kenton Carlos MD

## 2022-03-17 NOTE — ADDENDUM NOTE
Addended by: LUKAS BECKFORD on: 8/19/2019 04:40 PM     Modules accepted: Traci     Patient currently in the office.

## 2022-03-24 ENCOUNTER — TELEPHONE (OUTPATIENT)
Dept: TRANSPLANT | Facility: CLINIC | Age: 62
End: 2022-03-24
Payer: COMMERCIAL

## 2022-03-24 NOTE — TELEPHONE ENCOUNTER
Pt called with lab results.  Creat improved- occasional LE swelling- no weight gain.  Message sent to Dr. Ortiz regarding resuming or continuing to hold Torsemide.

## 2022-03-29 ENCOUNTER — TELEPHONE (OUTPATIENT)
Dept: TRANSPLANT | Facility: CLINIC | Age: 62
End: 2022-03-29
Payer: COMMERCIAL

## 2022-03-29 DIAGNOSIS — Z94.1 STATUS POST HEART TRANSPLANTATION (H): Primary | ICD-10-CM

## 2022-03-29 NOTE — TELEPHONE ENCOUNTER
Reviewed labs and fluid status with Dr. Ortiz.  OK for pt to take Toresemide as needed for edema- pt to call if he is taking more than one dose a week.  Pt called with above instructions and states understanding.

## 2022-04-09 DIAGNOSIS — Z11.59 ENCOUNTER FOR SCREENING FOR OTHER VIRAL DISEASES: Primary | ICD-10-CM

## 2022-04-18 NOTE — PROGRESS NOTES
"ADULT HEART TRANSPLANT CLINIC  February 23, 2022    HPI:    \"Vasyl" Aron is a 61yr old male with a history of CAD (STEMI with ALYSSA to LAD 6/27/18), ICM (LVEF 20-25%) s/p HM3 LVAD (12/31/18), monomorphic VT (s/p ICD with shocks), LV thrombus, CKD, elevated PSA, pAF, HTN, HL, and DMII, now s/p OHT 5/18/20, who presents for ongoing evaluation and management.     His post-transplant course was c/b NSVT (with no hemodynamic compromise), leukocytosis with C Acnes growing from his former LVAD site (thought to be a contaminant, but treated with IV vanco --> po doxy through 6/1/20, 14d course total), and in the setting of donor blood growing S anginosus and Veillonella (also thought to be a contaminant, but treated with Vanco/zosyn --> Vanco/Flagyl for a total of 7 days).  He also had hyperkalemia, which improved following kayexalate and stopping bactrim.  He ultimately discharged 5/29/20.     He was readmitted 8/31-9/24/20 with fever, cough, diarrhea, and syncope.  He was found to have a post-obstructive vs aspiration pneumonia, RUL/suprahilar mass, and narrowing of multiple RUL bronchi.  He was initially treated as a fungal infection as his fungitelle was +, but he did not clinically improve, nor did the size of the mass change.  Ultimately, tissue culture from the RUL mass showed abiotrophia defectiva (oral microbe), which was thought to be a contaminant, but did respond to antibiotics.  His MMF was decreased to 500mg twice daily, as ImmuKnow was noted to be low.      Since his last visit, he states that he feels okay overall. He denies any chest pain or pressure, sob, orthopnea, pnd, syncope/presyncope, gasper, headaches, acute vision changes, fevers, chills, nausea/vomiting or diarrhea.  He denies any problems with his medications but admits issues with his glycemic control.    Serostatus:  CMV D+/R-  EBV D-/R+  Toxo D-/R-      Patient Active Problem List    Diagnosis Date Noted     Status post coronary angiogram " 06/23/2021     Priority: Medium     Elevated prostate specific antigen (PSA) 01/07/2021     Priority: Medium     Acute kidney failure, unspecified (H) 09/18/2020     Priority: Medium     Fever 08/31/2020     Priority: Medium     Lung mass 08/31/2020     Priority: Medium     Added automatically from request for surgery [id number]       Kidney stone 08/28/2020     Priority: Medium     Rhinovirus infection 08/20/2020     Priority: Medium     Chronic prostatitis without hematuria 08/19/2020     Priority: Medium     Prophylactic antibiotic 08/19/2020     Priority: Medium     Pneumonia 08/18/2020     Priority: Medium     Heart replaced by transplant (H) 05/26/2020     Priority: Medium     Added automatically from request for surgery [id number]       Biventricular heart failure (H) 05/17/2020     Priority: Medium     Added automatically from request for surgery [id number]       Status post heart transplantation (H) 05/17/2020     Priority: Medium     Added automatically from request for surgery [id number]       Hydronephrosis 05/12/2020     Priority: Medium     Chronic systolic congestive heart failure (H) 02/13/2019     Priority: Medium     Added automatically from request for surgery [id number]       Weakness 01/11/2019     Priority: Medium     Type 2 diabetes mellitus with hyperglycemia, with long-term current use of insulin (H) 07/20/2018     Priority: Medium     Left ventricular apical thrombus following MI (H) 07/06/2018     Priority: Medium     Hematuria 07/04/2018     Priority: Medium     Long term current use of anticoagulant 07/03/2018     Priority: Medium     Hypotension, unspecified hypotension type 07/02/2018     Priority: Medium     Monomorphic ventricular tachycardia (H) 07/02/2018     Priority: Medium     Overview:   Added automatically from request for surgery [id number]       ASCVD (arteriosclerotic cardiovascular disease) 06/27/2018     Priority: Medium     CKD (chronic kidney disease) stage 3, GFR 30-59 ml/min (H)  06/27/2018     Priority: Medium     Dyslipidemia 06/27/2018     Priority: Medium     ST elevation myocardial infarction involving left anterior descending (LAD) coronary artery (H) 06/27/2018     Priority: Medium     Type 2 diabetes mellitus with hyperglycemia (H) 06/27/2018     Priority: Medium     ANDREW (acute kidney injury) (H) 10/06/2016     Priority: Medium     Ureteral obstruction 10/06/2016     Priority: Medium     Unilateral inguinal hernia 09/09/2009     Priority: Medium     Overview:   IMO Update 10/11         PAST MEDICAL HISTORY:  Past Medical History:   Diagnosis Date     Acute kidney injury (H)      CKD (chronic kidney disease)      Coronary artery disease      Diabetes mellitus (H)      HTN (hypertension)      Hyperlipidemia      ICD (implantable cardioverter-defibrillator), single chamber- NOT dependent 7/17/2018     Ischemic cardiomyopathy      LV (left ventricular) mural thrombus      Paroxysmal atrial flutter (H)      RVF (right ventricular failure) (H)      Systolic heart failure (H)      Ventricular tachycardia (H)        CURRENT MEDICATIONS:  ACCU-CHEK CHARLOTTE PLUS test strip, Check BG 3 times daily at meals and at bedtime.  acetaminophen (TYLENOL) 325 MG tablet, Take 2 tablets (650 mg) by mouth every 4 hours as needed for other (multimodal surgical pain management along with NSAIDS and opioid medication as indicated based on pain control and physical function.)  BD SILVANA U/F 32G X 4 MM insulin pen needle, Use 6 times daily.  calcium carbonate 600 mg-vitamin D 400 units (CALTRATE) 600-400 MG-UNIT per tablet, Take 1 tablet by mouth 2 times daily (with meals)  everolimus (ZORTRESS) 0.5 MG tablet, Take 4 tablets (2 mg) by mouth every morning AND 3 tablets (1.5 mg) every evening.  hydrALAZINE (APRESOLINE) 50 MG tablet, Take 2 tablets (100 mg) by mouth 3 times daily  magnesium oxide (MAG-OX) 400 (241.3 Mg) MG tablet, Take 1 tablet (400 mg) by mouth daily  multivitamin w/minerals (THERA-VIT-M) tablet,  "Take 1 tablet by mouth daily  mycophenolate (GENERIC EQUIVALENT) 250 MG capsule, Take 4 capsules (1,000 mg) by mouth 2 times daily  pantoprazole (PROTONIX) 40 MG EC tablet, Take 1 tablet (40 mg) by mouth every morning (before breakfast)  potassium chloride ER (KLOR-CON M) 20 MEQ CR tablet, Take 1 tablet (20 mEq) by mouth daily  rosuvastatin (CRESTOR) 10 MG tablet, Take 1 tablet (10 mg) by mouth daily  torsemide (DEMADEX) 20 MG tablet, Take 1 tablet (20 mg) by mouth daily (Patient not taking: Reported on 3/16/2022)  loperamide (IMODIUM) 2 MG capsule, Take 1 capsule (2 mg) by mouth 4 times daily as needed for diarrhea (Patient not taking: Reported on 12/2/2021)    No current facility-administered medications on file prior to visit.      Exam:  BP (!) 133/99 (BP Location: Right arm, Patient Position: Sitting, Cuff Size: Adult Regular)   Pulse (!) 121   Ht 1.644 m (5' 4.72\")   Wt 91.9 kg (202 lb 8 oz)   SpO2 97%   BMI 33.99 kg/m     Repeat manual /90  General: appears comfortable, alert and articulate  Head: normocephalic, atraumatic  Eyes: anicteric sclera, EOMI  Neck: no adenopathy, 2+ carotids without bruits  Orophyarynx: moist mucosa, no lesions, dentition intact  Heart: regular, S1/S2, no murmur, gallop, rub, estimated JVP <10cm  Lungs: clear, no rales or wheezing  Abdomen: soft, non-tender, bowel sounds present, no hepatomegaly  Extremities: no clubbing, cyanosis or edema  Neurological: normal speech and affect, no gross motor deficits.    Labs:  CBC RESULTS:   Lab Results   Component Value Date    WBC 6.3 02/23/2022    WBC 5.3 06/23/2021    RBC 4.54 02/23/2022    RBC 4.22 (L) 06/23/2021    HGB 12.0 (L) 02/23/2022    HGB 10.1 (L) 06/23/2021    HCT 36.6 (L) 02/23/2022    HCT 34.6 (L) 06/23/2021    MCV 81 02/23/2022    MCV 82 06/23/2021    MCH 26.4 (L) 02/23/2022    MCH 26.3 (L) 06/23/2021    MCHC 32.8 02/23/2022    MCHC 32.1 06/23/2021    RDW 14.4 02/23/2022    RDW 12.7 06/23/2021     02/23/2022 "     06/23/2021       BMP RESULTS:  Lab Results   Component Value Date     02/23/2022     06/23/2021    POTASSIUM 3.5 02/23/2022    POTASSIUM 3.6 06/23/2021    CHLORIDE 107 03/23/2022    CHLORIDE 105 06/23/2021    CO2 21 02/23/2022    CO2 24 06/23/2021    ANIONGAP 11 02/23/2022    ANIONGAP 7 06/23/2021     (H) 02/23/2022     (H) 06/23/2021    BUN 38 (H) 02/23/2022    BUN 35 (H) 06/23/2021    CR 1.89 (H) 02/23/2022    CR 1.93 (H) 06/23/2021    GFRESTIMATED 40 (L) 02/23/2022    GFRESTIMATED 37 (L) 06/23/2021    GFRESTBLACK 43 (L) 06/23/2021    ARLENE 8.9 02/23/2022    ARLENE 9.3 06/23/2021      LIPID RESULTS:  Lab Results   Component Value Date    CHOL 205 (H) 10/26/2021    CHOL 168 06/23/2021    HDL 40 10/26/2021    HDL 49 06/23/2021    LDL  10/26/2021      Comment:      Cannot estimate LDL when triglyceride exceeds 400 mg/dL     (H) 10/26/2021    LDL 75 06/23/2021    TRIG 451 (H) 10/26/2021    TRIG 220 (H) 06/23/2021    NHDL 165 (H) 10/26/2021    NHDL 119 06/23/2021       IMMUNOSUPPRESSANT LEVELS  Lab Results   Component Value Date    TACROL <3.0 (L) 02/23/2022    TACROL 3.5 (L) 06/23/2021    DOSTAC  02/23/2022      Comment:      Last dose information not provided.    DOSTAC Not Provided 06/23/2021       No components found for: CK  Lab Results   Component Value Date    MAG 2.0 02/23/2022    MAG 1.9 06/23/2021     Lab Results   Component Value Date    A1C 8.6 (H) 02/23/2022    A1C 10.3 (H) 06/23/2021     Lab Results   Component Value Date    PHOS 2.7 02/23/2022    PHOS 3.4 06/23/2021     Lab Results   Component Value Date    NTBNPI 1,673 (H) 08/18/2020     Lab Results   Component Value Date    SAITESTMET SA FCS 06/23/2021    SAICELL Class I 06/23/2021    [id number] Cw:15 06/23/2021    [id number] Cw:18 06/23/2021    SAIREPCOM  06/23/2021      Test performed by modified procedure. Serum heat inactivated and tested   by a modified ([place]) protocol including fetal calf serum addition.  "  High-risk, mfi >3,000. Mod-risk, mfi 500-3,000.       Lab Results   Component Value Date    SAIITESTME SA FCS 06/23/2021    SAIICELL Class II 06/23/2021    [id number] None 06/23/2021    [id number] None 06/23/2021    SAIIREPCOM  06/23/2021      Test performed by modified procedure. Serum heat inactivated and tested   by a modified ([place]) protocol including fetal calf serum addition.   High-risk, mfi >3,000. Mod-risk, mfi 500-3,000.       Lab Results   Component Value Date    CSPEC Plasma 06/23/2021       Echo 2/23/22  Global and regional left ventricular function is normal with an EF of 55-60%.  Right ventricular function, chamber size, wall motion, and thickness are normal.  Pulmonary artery systolic pressure cannot be assessed.  The inferior vena cava is normal.  No significant changes noted.      Assessment and Plan:   \"Anju Sharp is a 61yr old male with a history of CAD (STEMI with ALYSSA to LAD 6/27/18), ICM (LVEF 20-25%) s/p HM3 LVAD (12/31/18), monomorphic VT (s/p ICD with shocks), LV thrombus, CKD, elevated PSA, pAF, HTN, HL, and DMII, now s/p OHT 5/18/20, who presents for ongoing evaluation and management         ICM, s/p OHT 5/18/20  His post-transplant course was c/b NSVT (with no hemodynamic compromise), leukocytosis with C Acnes growing from his former LVAD site (thought to be a contaminant, but treated with IV vanco --> po doxy through 6/1/20, 14d course total), and in the setting of donor blood growing S anginosus and Veillonella (also thought to be a contaminant, but treated with Vanco/zosyn --> Vanco/Flagyl for a total of 7 days).  He also had hyperkalemia, which improved following kayexalate and stopping bactrim.  He ultimately discharged 5/29/20.     He was readmitted 8/31-9/24/20 with fever, cough, diarrhea, and syncope.  He was found to have a post-obstructive vs aspiration pneumonia, RUL/suprahilar mass, and narrowing of multiple RUL bronchi.  He was initially treated as a fungal infection as " his fungitelle was +, but he did not clinically improve, nor did the size of the mass change.  Ultimately, tissue culture from the RUL mass showed abiotrophia defectiva (oral microbe), which was thought to be a contaminant, but did respond to antibiotics.  His MMF was decreased to 500mg twice daily, as ImmuKnow was noted to be low.    Rejection history:  none  AlloMap scores:  Last several have remained < 35  DSAs:  none  Coronary angio/Ischemic eval: Coronary angiogram 6/2021 showed normal coronary arteries with no angiographic evidence of obstruction.  Last RHC:  10/2021 RA 6, mPA 22, PCW 17, and CI 2.7.  Echo/cMRI: TTE today confirmed stable graft function, with LVEF 55-60% and normal RV size/function.    Serostatus:  - CMV D+/R-  - EBV D-/R+  - Toxo D-/R-     Immunosuppression:  - MMF 1000mg BID  - everolimus, goal level 6-8.      PPx:  - CAV:  Continue rosuvastatin 10mg daily.  - GI:  Pantoprazole 40mg daily  - Osteoporosis:  Calcium/vitamin d supplements     Graft function:  - BPs:  slightly above goal, increase amlodipine to 10mg daily.  Continue hydralazine 100mg TID.  Instructed patient to continue to monitor BP and if consistently > 135/85 call clinic  - Encouraged patient to continue regular aerobic exercise aiming for at least 150 minutes of moderate physical activity or 75 minutes of vigorous physical activity - or an equal combination of both - each week. and follow low-salt, heart healthy diet.  - fluid status:  Euvolemic     Health maintenance:  -- completed COVID shots, needs booster  -- needs flu shot  -- needs shingrix   -- pneumonia shots unclear -- advised that he follow-up with PCP  -- derm exam overdue, never seen  -- eye exam current, gets yearly exams  -- dental exam overdue, prior to heart attack  -- last colo 12/2018, due now per his report         Post-obstructive versus aspiration pneumonia  RUL/suprahilar mass   +abiotrophia defectiva  He was readmitted 8/31/20-9/24/20 with fever,  cough, diarrhea, and syncope.  He was found to have a post-obstructive vs aspiration pneumonia, RUL/suprahilar mass, and narrowing of multiple RUL bronchi.  He was initially treated as a fungal infection as his fungitelle was +, but he did not clinically improve, nor did the size of the mass change.  Ultimately, tissue culture from the RUL mass showed abiotrophia defectiva (oral microbe), which was thought to be a contaminant, but did respond to antibiotics.  His MMF was decreased to 500mg twice daily, as ImmuKnow was noted to be low.  He completed 4 weeks of antibiotic therapy 10/11/20, and follow up chest CTs have shown improvement in the RUL infiltrate.     DMII  HgbA1c has remained above goal.  TG also elevated likely 2/2 poor glycemic control.  Followed by endocrine.  Would recommend adding farxiga     Elevated PSA  Prostate MRI 5/2020 showed signs of BPH, he follows with urology.       Follow-up:  In May for 2nd annual w/ biplane angio, rhc/hbx, echo and labs.  Will be happy to see sooner if change in clinical status or new questions/concerns arise.      Jenni Ortiz MD  Section Head - Advanced Heart Failure, Transplantation and Mechanical Circulatory Support  Director - Adult Congenital and Cardiovascular Genetics Center  Associate Professor of Medicine, [facility]    I spent a total of 45 minutes today in chart review as well as personally reviewing recent cardiac testing and/or laboratory results, today's history and examination, and discussion and counseling with the patient

## 2022-04-28 NOTE — PROGRESS NOTES
"Red is a 61 year old who is being evaluated via a billable video visit.      How would you like to obtain your AVS? MyChart  If the video visit is dropped, the invitation should be resent by: Text to cell phone: [phone]  Will anyone else be joining your video visit? No  {If patient encounters technical issues they should call [phone] :[id number]}    Video Start Time: {video visit start/end time for provider to select:152948}  Video-Visit Details    Type of service:  Video Visit    Video End Time:{video visit start/end time for provider to select:152948}    Originating Location (pt. Location): {video visit patient location:381563::\"Home\"}    Distant Location (provider location):  [facility]     Platform used for Video Visit: {Virtual Visit Platforms:962357::\"[employer]\"}    " Sarita Harden is a 27 y.o. female who presents today for:  Chief Complaint   Patient presents with    Other     belly button piercing got infected       HPI:   Got belly button pierced in Feb  Got infected 2 months ago  Last month, started to get worse  Got a longer bar that made it worse   Took it out 1 week ago  Looks worse  Oozing now    Has not seen anyone for it yet     Started on orilissa about 2 months ago for endometriosis        Food Insecurity: Food Insecurity Present    Worried About Running Out of Food in the Last Year: Sometimes true    Ede of Food in the Last Year: Sometimes true     Health Maintenance reviewed -   Health Maintenance   Topic Date Due    Pneumococcal 0-64 years Vaccine (1 - PCV) Never done    HPV vaccine (1 - 2-dose series) Never done    DTaP/Tdap/Td vaccine (1 - Tdap) Never done    Chlamydia screen  03/19/2021    COVID-19 Vaccine (3 - Booster for Moderna series) 03/02/2022    Flu vaccine (Season Ended) 09/01/2022    Depression Monitoring  01/03/2023    Pap smear  03/05/2024    Hepatitis C screen  Completed    HIV screen  Completed    Hepatitis A vaccine  Aged Out    Hepatitis B vaccine  Aged Out    Hib vaccine  Aged Out    Meningococcal (ACWY) vaccine  Aged Out    Varicella vaccine  Discontinued     Current Outpatient Medications   Medication Sig Dispense Refill    sulfamethoxazole-trimethoprim (BACTRIM DS;SEPTRA DS) 800-160 MG per tablet Take 1 tablet by mouth 2 times daily for 10 days 20 tablet 0    metFORMIN (GLUCOPHAGE-XR) 500 MG extended release tablet TAKE 1 TABLET BY MOUTH EVERY DAY WITH BREAKFAST 21 tablet 1    sertraline (ZOLOFT) 50 MG tablet Take 1 tablet by mouth daily 90 tablet 1    ondansetron (ZOFRAN-ODT) 4 MG disintegrating tablet Take 1 tablet by mouth 3 times daily as needed for Nausea or Vomiting 30 tablet 0    OMEPRAZOLE PO Take by mouth as needed       albuterol sulfate HFA (VENTOLIN HFA) 108 (90 Base) MCG/ACT inhaler Inhale 2 puffs into the lungs 4 times daily as needed for Wheezing 1 each 1    mometasone-formoterol (DULERA) 100-5 MCG/ACT inhaler Inhale 2 puffs into the lungs every 12 hours 1 each 2    Spacer/Aero-Holding Chambers SHAWANDA 1 Device by Does not apply route daily 1 each 0    Prenatal Vit-Fe Fumarate-FA (PRENATAL VITAMIN) 27-0.8 MG TABS Take 1 tablet by mouth daily 90 tablet 3    aspirin 81 MG EC tablet Take 81 mg by mouth daily       vitamin D3 (CHOLECALCIFEROL) 25 MCG (1000 UT) TABS tablet Take 2 tablets by mouth daily 90 tablet 3    Blood Pressure Monitor KIT Take blood pressure daily 1 kit 0    ORILISSA 150 MG TABS       rivaroxaban (XARELTO) 20 MG TABS tablet Take 1 tablet by mouth daily (with breakfast) 30 tablet 5    butalbital-acetaminophen-caffeine (FIORICET, ESGIC) -40 MG per tablet Take 1 tablet by mouth every 4 hours as needed for Headaches (Patient not taking: Reported on 2/10/2022) 10 tablet 0    magnesium (MAGNESIUM-OXIDE) 250 MG TABS tablet Take 2 tablets by mouth 4 times daily (with meals and nightly) 240 tablet 1     No current facility-administered medications for this visit. Social History     Tobacco Use    Smoking status: Never Smoker    Smokeless tobacco: Never Used    Tobacco comment: vapes-no nicotine   Substance Use Topics    Alcohol use: Never      Subjective:   Review of Systems   Constitutional: Negative for chills and fever. Skin: Positive for color change, rash and wound. Objective:     Vitals:    04/28/22 1544   BP: 136/74   Site: Right Upper Arm   Position: Sitting   Cuff Size: Medium Adult   Resp: 16   Temp: 97.1 °F (36.2 °C)   SpO2: 98%   Weight: 288 lb 9.6 oz (130.9 kg)   Height: 5' 9\" (1.753 m)     Body mass index is 42.62 kg/m².   Wt Readings from Last 3 Encounters:   04/28/22 288 lb 9.6 oz (130.9 kg)   03/03/22 282 lb (127.9 kg)   02/10/22 282 lb (127.9 kg)     BP Readings from Last 3 Encounters:   04/28/22 136/74   03/03/22 136/83   03/03/22 136/83     Physical Exam  Vitals and nursing note reviewed. Constitutional:       General: She is not in acute distress. Appearance: She is well-developed. She is obese. She is not diaphoretic. Cardiovascular:      Rate and Rhythm: Normal rate and regular rhythm. Heart sounds: Normal heart sounds. No murmur heard. Pulmonary:      Effort: Pulmonary effort is normal.      Breath sounds: Normal breath sounds. No wheezing. Abdominal:      General: There is no distension. Palpations: Abdomen is soft. Tenderness: There is no abdominal tenderness. Comments: Just superior to the umbilicus there are 2 discrete small areas of erythema and scabbing. There is no drainage at this time. Tender to palpation, indurated   Neurological:      Mental Status: She is alert. Psychiatric:         Thought Content: Thought content normal.         Judgment: Judgment normal.         Assessment / Plan:   Oliver Henson was seen today for other. Diagnoses and all orders for this visit:    Cellulitis of abdominal wall  -     sulfamethoxazole-trimethoprim (BACTRIM DS;SEPTRA DS) 800-160 MG per tablet; Take 1 tablet by mouth 2 times daily for 10 days      Patient presents to discuss her symptoms   Cellulitis-acute problem. Given patient's history of purulent drainage, we will prescribe Bactrim twice daily x10 days. Patient was encouraged to keep the area clean with gentle soap and water and keep the area covered to prevent secondary infection. Patient advised to return in 2 weeks if not feeling better. Patient to follow-up as needed for this, 3 months for general exam     Return in about 3 months (around 7/28/2022) for Follow-up chronic diseases.     Medications Prescribed:  Orders Placed This Encounter   Medications    sulfamethoxazole-trimethoprim (BACTRIM DS;SEPTRA DS) 800-160 MG per tablet     Sig: Take 1 tablet by mouth 2 times daily for 10 days     Dispense:  20 tablet     Refill:  0       No future appointments. Patient given educational materials - see patient instructions. Discussed use, benefit, and side effects of prescribed medications. All patient questions answered. Pt voiced understanding. Instructed to continue current medications, diet and exercise. Patient agreed with treatment plan. Follow up as directed. Part of this visit was documented via [id number] technology, please excuse any errors.      Electronically signed by Lex Shine MD on 4/28/2022 at 4:19 PM

## 2022-05-08 DIAGNOSIS — E11.65 TYPE 2 DIABETES MELLITUS WITH HYPERGLYCEMIA, WITH LONG-TERM CURRENT USE OF INSULIN (H): ICD-10-CM

## 2022-05-08 DIAGNOSIS — Z79.4 TYPE 2 DIABETES MELLITUS WITH HYPERGLYCEMIA, WITH LONG-TERM CURRENT USE OF INSULIN (H): ICD-10-CM

## 2022-05-08 RX ORDER — INSULIN LISPRO 100 [IU]/ML
INJECTION, SOLUTION INTRAVENOUS; SUBCUTANEOUS
Qty: 63 ML | Refills: 2 | Status: SHIPPED | OUTPATIENT
Start: 2022-05-08 | End: 2022-05-09

## 2022-05-08 NOTE — TELEPHONE ENCOUNTER
HUMALOG KWIKPEN 100 UNIT/ML soln   Last Written Prescription Date: 3/4/22  Last Fill Quantity: 63ml,   # refills: 2  Last Office Visit :3/4/22  Future Office visit: none    Routing refill request to provider for review/approval because : pharmacy get not order 3/4/22.it was entered historical. Pharm states he just picked up 90 day supply. Please enter correct class. thanks.

## 2022-05-09 DIAGNOSIS — Z79.4 TYPE 2 DIABETES MELLITUS WITH HYPERGLYCEMIA, WITH LONG-TERM CURRENT USE OF INSULIN (H): ICD-10-CM

## 2022-05-09 DIAGNOSIS — E11.65 TYPE 2 DIABETES MELLITUS WITH HYPERGLYCEMIA, WITH LONG-TERM CURRENT USE OF INSULIN (H): ICD-10-CM

## 2022-05-09 RX ORDER — INSULIN LISPRO 100 [IU]/ML
INJECTION, SOLUTION INTRAVENOUS; SUBCUTANEOUS
Qty: 75 ML | Refills: 1 | Status: SHIPPED | OUTPATIENT
Start: 2022-05-09 | End: 2023-03-09

## 2022-05-11 DIAGNOSIS — Z94.1 HEART REPLACED BY TRANSPLANT (H): ICD-10-CM

## 2022-05-11 RX ORDER — AMLODIPINE BESYLATE 10 MG/1
10 TABLET ORAL DAILY
Qty: 90 TABLET | Refills: 3 | Status: ON HOLD | OUTPATIENT
Start: 2022-05-11 | End: 2023-05-11

## 2022-05-12 ENCOUNTER — TELEPHONE (OUTPATIENT)
Dept: TRANSPLANT | Facility: CLINIC | Age: 62
End: 2022-05-12
Payer: COMMERCIAL

## 2022-05-12 DIAGNOSIS — Z13.220 LIPID SCREENING: ICD-10-CM

## 2022-05-12 DIAGNOSIS — Z94.1 HEART REPLACED BY TRANSPLANT (H): Primary | ICD-10-CM

## 2022-05-12 DIAGNOSIS — E11.9 DIABETES MELLITUS, TYPE 2 (H): ICD-10-CM

## 2022-05-12 RX ORDER — POTASSIUM CHLORIDE 1500 MG/1
40 TABLET, EXTENDED RELEASE ORAL
Status: CANCELLED | OUTPATIENT
Start: 2022-05-12

## 2022-05-12 RX ORDER — ASPIRIN 325 MG
325 TABLET ORAL ONCE
Status: CANCELLED | OUTPATIENT
Start: 2022-05-12 | End: 2022-05-12

## 2022-05-12 RX ORDER — LIDOCAINE 40 MG/G
CREAM TOPICAL
Status: CANCELLED | OUTPATIENT
Start: 2022-05-12

## 2022-05-12 RX ORDER — POTASSIUM CHLORIDE 1500 MG/1
20 TABLET, EXTENDED RELEASE ORAL
Status: CANCELLED | OUTPATIENT
Start: 2022-05-12

## 2022-05-12 RX ORDER — ASPIRIN 81 MG/1
243 TABLET, CHEWABLE ORAL ONCE
Status: CANCELLED | OUTPATIENT
Start: 2022-05-12

## 2022-05-12 RX ORDER — SODIUM CHLORIDE 9 MG/ML
INJECTION, SOLUTION INTRAVENOUS CONTINUOUS
Status: CANCELLED | OUTPATIENT
Start: 2022-05-12

## 2022-05-17 ENCOUNTER — TELEPHONE (OUTPATIENT)
Dept: CARDIOLOGY | Facility: CLINIC | Age: 62
End: 2022-05-17
Payer: COMMERCIAL

## 2022-05-17 NOTE — TELEPHONE ENCOUNTER
Call complete for pre procedure reminder and updated visitor policy.    Covid test completed yesterday. Message sent to coordinator to see if she already received results.

## 2022-05-18 ENCOUNTER — HOSPITAL ENCOUNTER (OUTPATIENT)
Facility: CLINIC | Age: 62
Discharge: HOME | End: 2022-05-18
Attending: INTERNAL MEDICINE | Admitting: INTERNAL MEDICINE
Payer: COMMERCIAL

## 2022-05-18 ENCOUNTER — LAB (OUTPATIENT)
Dept: LAB | Facility: CLINIC | Age: 62
End: 2022-05-18

## 2022-05-18 ENCOUNTER — ANCILLARY PROCEDURE (OUTPATIENT)
Dept: CARDIOLOGY | Facility: CLINIC | Age: 62
End: 2022-05-18
Attending: INTERNAL MEDICINE
Payer: COMMERCIAL

## 2022-05-18 ENCOUNTER — APPOINTMENT (OUTPATIENT)
Dept: MEDSURG UNIT | Facility: CLINIC | Age: 62
End: 2022-05-18
Attending: INTERNAL MEDICINE
Payer: COMMERCIAL

## 2022-05-18 ENCOUNTER — ANCILLARY PROCEDURE (OUTPATIENT)
Dept: GENERAL RADIOLOGY | Facility: CLINIC | Age: 62
End: 2022-05-18
Attending: INTERNAL MEDICINE
Payer: COMMERCIAL

## 2022-05-18 VITALS
HEART RATE: 114 BPM | WEIGHT: 202 LBS | HEIGHT: 65 IN | SYSTOLIC BLOOD PRESSURE: 119 MMHG | OXYGEN SATURATION: 95 % | BODY MASS INDEX: 33.66 KG/M2 | DIASTOLIC BLOOD PRESSURE: 81 MMHG

## 2022-05-18 VITALS
RESPIRATION RATE: 16 BRPM | OXYGEN SATURATION: 95 % | SYSTOLIC BLOOD PRESSURE: 109 MMHG | TEMPERATURE: 97.6 F | HEART RATE: 111 BPM | DIASTOLIC BLOOD PRESSURE: 84 MMHG

## 2022-05-18 DIAGNOSIS — Z94.1 STATUS POST HEART TRANSPLANTATION (H): ICD-10-CM

## 2022-05-18 DIAGNOSIS — Z13.220 LIPID SCREENING: ICD-10-CM

## 2022-05-18 DIAGNOSIS — Z94.1 HEART REPLACED BY TRANSPLANT (H): ICD-10-CM

## 2022-05-18 DIAGNOSIS — E11.9 DIABETES MELLITUS, TYPE 2 (H): ICD-10-CM

## 2022-05-18 DIAGNOSIS — Z94.1 STATUS POST HEART TRANSPLANTATION (H): Primary | ICD-10-CM

## 2022-05-18 LAB
ALBUMIN SERPL-MCNC: 3.8 G/DL (ref 3.4–5)
ALP SERPL-CCNC: 77 U/L (ref 40–150)
ALT SERPL W P-5'-P-CCNC: 49 U/L (ref 0–70)
ANION GAP SERPL CALCULATED.3IONS-SCNC: 10 MMOL/L (ref 3–14)
AST SERPL W P-5'-P-CCNC: 34 U/L (ref 0–45)
BASOPHILS # BLD AUTO: 0.1 10E3/UL (ref 0–0.2)
BASOPHILS NFR BLD AUTO: 1 %
BILIRUB SERPL-MCNC: 1 MG/DL (ref 0.2–1.3)
BUN SERPL-MCNC: 41 MG/DL (ref 7–30)
CALCIUM SERPL-MCNC: 9.2 MG/DL (ref 8.5–10.1)
CHLORIDE BLD-SCNC: 103 MMOL/L (ref 94–109)
CHOLEST SERPL-MCNC: 200 MG/DL
CK SERPL-CCNC: 282 U/L (ref 30–300)
CMV DNA SPEC NAA+PROBE-ACNC: NOT DETECTED IU/ML
CO2 SERPL-SCNC: 25 MMOL/L (ref 20–32)
CREAT SERPL-MCNC: 1.61 MG/DL (ref 0.66–1.25)
EOSINOPHIL # BLD AUTO: 0.2 10E3/UL (ref 0–0.7)
EOSINOPHIL NFR BLD AUTO: 2 %
ERYTHROCYTE [DISTWIDTH] IN BLOOD BY AUTOMATED COUNT: 13.7 % (ref 10–15)
EVEROLIMUS BLD-MCNC: 7.7 UG/L (ref 3–8)
FASTING STATUS PATIENT QL REPORTED: YES
GFR SERPL CREATININE-BSD FRML MDRD: 49 ML/MIN/1.73M2
GLUCOSE BLD-MCNC: 241 MG/DL (ref 70–99)
GLUCOSE BLDC GLUCOMTR-MCNC: 209 MG/DL (ref 70–99)
HBA1C MFR BLD: 8.3 % (ref 0–5.6)
HCT VFR BLD AUTO: 41.3 % (ref 40–53)
HDLC SERPL-MCNC: 26 MG/DL
HGB BLD-MCNC: 12.4 G/DL (ref 13.3–17.7)
HGB BLD-MCNC: 12.5 G/DL (ref 13.3–17.7)
HGB BLD-MCNC: 14.6 G/DL (ref 13.3–17.7)
IMM GRANULOCYTES # BLD: 0 10E3/UL
IMM GRANULOCYTES NFR BLD: 0 %
LDLC SERPL CALC-MCNC: ABNORMAL MG/DL
LVEF ECHO: NORMAL
LYMPHOCYTES # BLD AUTO: 1 10E3/UL (ref 0.8–5.3)
LYMPHOCYTES NFR BLD AUTO: 14 %
MAGNESIUM SERPL-MCNC: 2.4 MG/DL (ref 1.6–2.3)
MCH RBC QN AUTO: 29 PG (ref 26.5–33)
MCHC RBC AUTO-ENTMCNC: 35.4 G/DL (ref 31.5–36.5)
MCV RBC AUTO: 82 FL (ref 78–100)
MONOCYTES # BLD AUTO: 0.9 10E3/UL (ref 0–1.3)
MONOCYTES NFR BLD AUTO: 13 %
NEUTROPHILS # BLD AUTO: 4.7 10E3/UL (ref 1.6–8.3)
NEUTROPHILS NFR BLD AUTO: 70 %
NONHDLC SERPL-MCNC: 174 MG/DL
NRBC # BLD AUTO: 0 10E3/UL
NRBC BLD AUTO-RTO: 0 /100
OXYHGB MFR BLDA: 98 % (ref 92–100)
OXYHGB MFR BLDV: 66 % (ref 92–100)
PHOSPHATE SERPL-MCNC: 3.5 MG/DL (ref 2.5–4.5)
PLATELET # BLD AUTO: 290 10E3/UL (ref 150–450)
POTASSIUM BLD-SCNC: 3.5 MMOL/L (ref 3.4–5.3)
PROT SERPL-MCNC: 7.8 G/DL (ref 6.8–8.8)
RBC # BLD AUTO: 5.04 10E6/UL (ref 4.4–5.9)
SODIUM SERPL-SCNC: 138 MMOL/L (ref 133–144)
TME LAST DOSE: NORMAL H
TME LAST DOSE: NORMAL H
TRIGL SERPL-MCNC: 1670 MG/DL
WBC # BLD AUTO: 6.8 10E3/UL (ref 4–11)

## 2022-05-18 PROCEDURE — 71046 X-RAY EXAM CHEST 2 VIEWS: CPT | Performed by: RADIOLOGY

## 2022-05-18 PROCEDURE — 93505 ENDOMYOCARDIAL BIOPSY: CPT | Mod: 26 | Performed by: INTERNAL MEDICINE

## 2022-05-18 PROCEDURE — 999N000134 HC STATISTIC PP CARE STAGE 3

## 2022-05-18 PROCEDURE — 93458 L HRT ARTERY/VENTRICLE ANGIO: CPT | Mod: 26 | Performed by: INTERNAL MEDICINE

## 2022-05-18 PROCEDURE — 999N000142 HC STATISTIC PROCEDURE PREP ONLY

## 2022-05-18 PROCEDURE — 99153 MOD SED SAME PHYS/QHP EA: CPT | Performed by: INTERNAL MEDICINE

## 2022-05-18 PROCEDURE — 93005 ELECTROCARDIOGRAM TRACING: CPT

## 2022-05-18 PROCEDURE — 80061 LIPID PANEL: CPT | Performed by: NURSE PRACTITIONER

## 2022-05-18 PROCEDURE — 99152 MOD SED SAME PHYS/QHP 5/>YRS: CPT | Mod: GC | Performed by: INTERNAL MEDICINE

## 2022-05-18 PROCEDURE — 86352 CELL FUNCTION ASSAY W/STIM: CPT | Performed by: NURSE PRACTITIONER

## 2022-05-18 PROCEDURE — C1894 INTRO/SHEATH, NON-LASER: HCPCS | Performed by: INTERNAL MEDICINE

## 2022-05-18 PROCEDURE — 86832 HLA CLASS I HIGH DEFIN QUAL: CPT | Performed by: NURSE PRACTITIONER

## 2022-05-18 PROCEDURE — 99215 OFFICE O/P EST HI 40 MIN: CPT | Performed by: NURSE PRACTITIONER

## 2022-05-18 PROCEDURE — 93505 ENDOMYOCARDIAL BIOPSY: CPT | Performed by: INTERNAL MEDICINE

## 2022-05-18 PROCEDURE — 250N000009 HC RX 250: Performed by: INTERNAL MEDICINE

## 2022-05-18 PROCEDURE — 84100 ASSAY OF PHOSPHORUS: CPT | Performed by: NURSE PRACTITIONER

## 2022-05-18 PROCEDURE — 85025 COMPLETE CBC W/AUTO DIFF WBC: CPT | Performed by: PATHOLOGY

## 2022-05-18 PROCEDURE — 93321 DOPPLER ECHO F-UP/LMTD STD: CPT | Mod: GC | Performed by: INTERNAL MEDICINE

## 2022-05-18 PROCEDURE — 93308 TTE F-UP OR LMTD: CPT | Mod: GC | Performed by: INTERNAL MEDICINE

## 2022-05-18 PROCEDURE — 99207 PR STATISTIC IV PUSH SINGLE INITIAL SUBSTANCE: CPT | Performed by: INTERNAL MEDICINE

## 2022-05-18 PROCEDURE — 82550 ASSAY OF CK (CPK): CPT | Performed by: NURSE PRACTITIONER

## 2022-05-18 PROCEDURE — G0463 HOSPITAL OUTPT CLINIC VISIT: HCPCS

## 2022-05-18 PROCEDURE — 80169 DRUG ASSAY EVEROLIMUS: CPT | Performed by: NURSE PRACTITIONER

## 2022-05-18 PROCEDURE — 82810 BLOOD GASES O2 SAT ONLY: CPT

## 2022-05-18 PROCEDURE — 93454 CORONARY ARTERY ANGIO S&I: CPT | Performed by: INTERNAL MEDICINE

## 2022-05-18 PROCEDURE — 272N000001 HC OR GENERAL SUPPLY STERILE: Performed by: INTERNAL MEDICINE

## 2022-05-18 PROCEDURE — 86833 HLA CLASS II HIGH DEFIN QUAL: CPT | Performed by: NURSE PRACTITIONER

## 2022-05-18 PROCEDURE — 93010 ELECTROCARDIOGRAM REPORT: CPT | Performed by: INTERNAL MEDICINE

## 2022-05-18 PROCEDURE — 85018 HEMOGLOBIN: CPT

## 2022-05-18 PROCEDURE — 36415 COLL VENOUS BLD VENIPUNCTURE: CPT | Performed by: PATHOLOGY

## 2022-05-18 PROCEDURE — 83036 HEMOGLOBIN GLYCOSYLATED A1C: CPT | Performed by: NURSE PRACTITIONER

## 2022-05-18 PROCEDURE — 250N000011 HC RX IP 250 OP 636: Performed by: INTERNAL MEDICINE

## 2022-05-18 PROCEDURE — 999N000054 HC STATISTIC EKG NON-CHARGEABLE

## 2022-05-18 PROCEDURE — 99152 MOD SED SAME PHYS/QHP 5/>YRS: CPT | Performed by: INTERNAL MEDICINE

## 2022-05-18 PROCEDURE — 82962 GLUCOSE BLOOD TEST: CPT

## 2022-05-18 PROCEDURE — 80053 COMPREHEN METABOLIC PANEL: CPT | Performed by: NURSE PRACTITIONER

## 2022-05-18 PROCEDURE — 93325 DOPPLER ECHO COLOR FLOW MAPG: CPT | Mod: GC | Performed by: INTERNAL MEDICINE

## 2022-05-18 PROCEDURE — 93456 R HRT CORONARY ARTERY ANGIO: CPT | Performed by: INTERNAL MEDICINE

## 2022-05-18 PROCEDURE — 88350 IMFLUOR EA ADDL 1ANTB STN PX: CPT | Mod: TC | Performed by: INTERNAL MEDICINE

## 2022-05-18 PROCEDURE — C1769 GUIDE WIRE: HCPCS | Performed by: INTERNAL MEDICINE

## 2022-05-18 PROCEDURE — 87799 DETECT AGENT NOS DNA QUANT: CPT | Performed by: NURSE PRACTITIONER

## 2022-05-18 PROCEDURE — 250N000013 HC RX MED GY IP 250 OP 250 PS 637: Performed by: INTERNAL MEDICINE

## 2022-05-18 PROCEDURE — 83735 ASSAY OF MAGNESIUM: CPT | Performed by: NURSE PRACTITIONER

## 2022-05-18 PROCEDURE — C1887 CATHETER, GUIDING: HCPCS | Performed by: INTERNAL MEDICINE

## 2022-05-18 RX ORDER — FENTANYL CITRATE 50 UG/ML
INJECTION, SOLUTION INTRAMUSCULAR; INTRAVENOUS
Status: DISCONTINUED | OUTPATIENT
Start: 2022-05-18 | End: 2022-05-18 | Stop reason: HOSPADM

## 2022-05-18 RX ORDER — SODIUM CHLORIDE 9 MG/ML
INJECTION, SOLUTION INTRAVENOUS CONTINUOUS
Status: DISCONTINUED | OUTPATIENT
Start: 2022-05-18 | End: 2022-05-18 | Stop reason: HOSPADM

## 2022-05-18 RX ORDER — LIDOCAINE 40 MG/G
CREAM TOPICAL
Status: DISCONTINUED | OUTPATIENT
Start: 2022-05-18 | End: 2022-05-18 | Stop reason: HOSPADM

## 2022-05-18 RX ORDER — NALOXONE HYDROCHLORIDE 0.4 MG/ML
0.2 INJECTION, SOLUTION INTRAMUSCULAR; INTRAVENOUS; SUBCUTANEOUS
Status: DISCONTINUED | OUTPATIENT
Start: 2022-05-18 | End: 2022-05-18 | Stop reason: HOSPADM

## 2022-05-18 RX ORDER — IOPAMIDOL 755 MG/ML
INJECTION, SOLUTION INTRAVASCULAR
Status: DISCONTINUED | OUTPATIENT
Start: 2022-05-18 | End: 2022-05-18 | Stop reason: HOSPADM

## 2022-05-18 RX ORDER — NALOXONE HYDROCHLORIDE 0.4 MG/ML
0.4 INJECTION, SOLUTION INTRAMUSCULAR; INTRAVENOUS; SUBCUTANEOUS
Status: DISCONTINUED | OUTPATIENT
Start: 2022-05-18 | End: 2022-05-18 | Stop reason: HOSPADM

## 2022-05-18 RX ORDER — POTASSIUM CHLORIDE 750 MG/1
40 TABLET, EXTENDED RELEASE ORAL
Status: DISCONTINUED | OUTPATIENT
Start: 2022-05-18 | End: 2022-05-18 | Stop reason: HOSPADM

## 2022-05-18 RX ORDER — NITROGLYCERIN 5 MG/ML
VIAL (ML) INTRAVENOUS
Status: DISCONTINUED | OUTPATIENT
Start: 2022-05-18 | End: 2022-05-18 | Stop reason: HOSPADM

## 2022-05-18 RX ORDER — POTASSIUM CHLORIDE 750 MG/1
20 TABLET, EXTENDED RELEASE ORAL
Status: COMPLETED | OUTPATIENT
Start: 2022-05-18 | End: 2022-05-18

## 2022-05-18 RX ORDER — ATROPINE SULFATE 0.1 MG/ML
0.5 INJECTION INTRAVENOUS
Status: DISCONTINUED | OUTPATIENT
Start: 2022-05-18 | End: 2022-05-18 | Stop reason: HOSPADM

## 2022-05-18 RX ORDER — FLUMAZENIL 0.1 MG/ML
0.2 INJECTION, SOLUTION INTRAVENOUS
Status: DISCONTINUED | OUTPATIENT
Start: 2022-05-18 | End: 2022-05-18 | Stop reason: HOSPADM

## 2022-05-18 RX ORDER — OXYCODONE HYDROCHLORIDE 5 MG/1
5 TABLET ORAL EVERY 4 HOURS PRN
Status: DISCONTINUED | OUTPATIENT
Start: 2022-05-18 | End: 2022-05-18 | Stop reason: HOSPADM

## 2022-05-18 RX ORDER — ASPIRIN 81 MG/1
243 TABLET, CHEWABLE ORAL ONCE
Status: COMPLETED | OUTPATIENT
Start: 2022-05-18 | End: 2022-05-18

## 2022-05-18 RX ORDER — OXYCODONE HYDROCHLORIDE 10 MG/1
10 TABLET ORAL EVERY 4 HOURS PRN
Status: DISCONTINUED | OUTPATIENT
Start: 2022-05-18 | End: 2022-05-18 | Stop reason: HOSPADM

## 2022-05-18 RX ORDER — FENTANYL CITRATE 50 UG/ML
25 INJECTION, SOLUTION INTRAMUSCULAR; INTRAVENOUS
Status: DISCONTINUED | OUTPATIENT
Start: 2022-05-18 | End: 2022-05-18 | Stop reason: HOSPADM

## 2022-05-18 RX ORDER — HEPARIN SODIUM 1000 [USP'U]/ML
INJECTION, SOLUTION INTRAVENOUS; SUBCUTANEOUS
Status: DISCONTINUED | OUTPATIENT
Start: 2022-05-18 | End: 2022-05-18 | Stop reason: HOSPADM

## 2022-05-18 RX ORDER — ASPIRIN 325 MG
325 TABLET ORAL ONCE
Status: COMPLETED | OUTPATIENT
Start: 2022-05-18 | End: 2022-05-18

## 2022-05-18 RX ORDER — NICARDIPINE HYDROCHLORIDE 2.5 MG/ML
INJECTION INTRAVENOUS
Status: DISCONTINUED | OUTPATIENT
Start: 2022-05-18 | End: 2022-05-18 | Stop reason: HOSPADM

## 2022-05-18 RX ADMIN — POTASSIUM CHLORIDE 20 MEQ: 750 TABLET, EXTENDED RELEASE ORAL at 12:29

## 2022-05-18 RX ADMIN — LIDOCAINE: 40 CREAM TOPICAL at 12:37

## 2022-05-18 RX ADMIN — ASPIRIN 81 MG CHEWABLE TABLET 324 MG: 81 TABLET CHEWABLE at 12:30

## 2022-05-18 RX ADMIN — Medication 6 ML: at 08:55

## 2022-05-18 ASSESSMENT — PAIN SCALES - GENERAL: PAINLEVEL: NO PAIN (0)

## 2022-05-18 NOTE — DISCHARGE INSTRUCTIONS
Going Home Right Heart Cath, Right Heart Biopsy and Coronary Angiogram  ______________________________________________      After you go home:  Have an adult stay with you for 24 hours.  Drink plenty of fluids.  You may eat your normal diet, unless your doctor tells you otherwise.  For 24 hours:  Relax and take it easy.  Do NOT smoke.  Do NOT make any important or legal decisions.  Do NOT drive or operate machines at home or at work.  Do NOT drink alcohol.  Remove the Band-Aid after 24 hours. If there is minor oozing, apply another Band-aid and remove it after 12 hours.  For 2 days, do NOT do any heavy exercise.  Do NOT take a bath, or use a hot tub or pool for at least 3 days. You may shower.      If you start bleeding from the site in your wrist or neck site, lie down flat and press firmly  on the site. Call your doctor as soon as you can.    Care of wrist  It is normal to have soreness at the puncture site and mild tingling in your hand for up to 3 days.  For 2 days, do not use your hand or arm to support your weight (such as rising from a chair) or bend your wrist (such as lifting a garage door).  For 2 days, do not lift more than 5 pounds or exercise your arm (tennis, golf or bowling).    If you start bleeding from the site in your arm:  Sit down and press firmly on the site with your fingers for 10 minutes. Call your doctor as soon as you can.  If the bleeding stops, sit still and keep your wrist straight for 2 hours.    Right Neck site: You may resume all normal activity.  Monitor neck site for bleeding, swelling, or voice changes. If you notice bleeding or swelling immediately apply pressure to the site and call number below to speak with Cardiology Fellow.  If you experience any changes in your breathing you should call your doctor immediately or come to the closest Emergency Department.  Do not drive yourself.      Medicines  If you have started taking Plavix or Effient, do not stop taking it until you  talk to your heart doctor (cardiologist).  If you are on metformin (Glucophage), do not restart it until you have blood tests (within 2 to 3 days after discharge). When your doctor tells you it is safe, you may restart the metformin.  If you have stopped any other medicines, check with your nurse or provider about when to restart them.    Call 911 right away if you have bleeding that is heavy or does not stop.    Call your doctor if:  You have a large or growing hard lump around the site.  The site is red, swollen, hot or tender.  Blood or fluid is draining from the site.  You have chills or a fever greater than 101 F (38 C).  Your leg or arm feels numb or cool.  You have hives, a rash or unusual itching.      [facility] [place]:  [phone] (7 days a week)

## 2022-05-18 NOTE — PATIENT INSTRUCTIONS
Patient Instructions  1. Please get your Covid booster and ask your primary MD about the Shingrix and pneumonia vaccines.  2. You are due for a colonoscopy- please schedule through your Primary MD.  3. Restart a baby aspirin- 81mg daily.  4.  Angelika will call with remaining results for tests and labs.  5. Call [phone] to schedule Evusheld injections if you are unable to do so in [place].    Next transplant clinic appointment:  in 6 months for 30 month visit.  Next lab draw: In 6 months or sooner if needed.  Coordinator will call with all pending results.     Please call transplant coordinator with any questions:    Angelika Muller RN BSN   Post Heart Transplant Nurse Coordinator  [facility]  Questions: [phone]

## 2022-05-18 NOTE — PROGRESS NOTES
D/I/A: Pt roomed on 3C in bay [address].  Arrived via litter and accompanied by RN off monitor.  VSSA.  Rhythm upon arrival ST on monitor.  Denies pain or sob.  Reviewed activity restrictions and when to notify RN, ie-changes to breathing or increased chest pressure or chest pain.  CCL access:  R radial access had TR band with 13 cc of air. R internal jugular site covered with drsg/shealth was pulled out in CCL.  P: Continue to monitor status.  Discharge to home once meeting criteria.

## 2022-05-18 NOTE — PROGRESS NOTES
Arrived from home for a routine RHC and CORS s/p heart transplant.  VSS.  Denies pain.  PIV placed.  H&P current.  Consent obtained.  Ready for procedure.

## 2022-05-18 NOTE — Clinical Note
dry, intact, no bleeding and no hematoma. 7Fr sheath removed from RIJ. Manual pressure held. Hemostasis obtained.   6Fr sheath removed from RRA. TR band applied with 12cc in balloon. All VSS

## 2022-05-18 NOTE — PROGRESS NOTES
"ADULT HEART TRANSPLANT CLINIC  May 18, 2022    HPI:    \"Anju Sharp is a 61yr old male with a history of CAD (STEMI with ALYSSA to LAD 6/27/18), ICM (LVEF 20-25%, s/p HM3 LVAD 12/31/18) s/p OHT 5/18/20, monomorphic VT (s/p ICD with shocks), LV thrombus, CKD, elevated PSA, pAF, HTN, HL, and DMII who presents to clinic for routine follow up.     His post-transplant course was c/b NSVT (with no hemodynamic compromise), leukocytosis with C Acnes growing from his former LVAD site (thought to be a contaminant, but treated with IV vanco --> po doxy through 6/1/20, 14d course total), and in the setting of donor blood growing S anginosus and Veillonella (also thought to be a contaminant, but treated with Vanco/zosyn --> Vanco/Flagyl for a total of 7 days).  He also had hyperkalemia, which improved following kayexalate and stopping bactrim.  He ultimately discharged 5/29/20.     He was readmitted 8/31-9/24/20 with fever, cough, diarrhea, and syncope.  He was found to have a post-obstructive vs aspiration pneumonia, RUL/suprahilar mass, and narrowing of multiple RUL bronchi.  He was initially treated as a fungal infection as his fungitelle was +, but he did not clinically improve, nor did the size of the mass change.  Ultimately, tissue culture from the RUL mass showed abiotrophia defectiva (oral microbe), which was thought to be a contaminant, but did respond to antibiotics.  His MMF was decreased to 500mg twice daily, as ImmuKnow was noted to be low.     Rejection history:  none  AlloMap scores:  < 35  DSAs:  none  Coronary angio/Ischemic eval: Coronary angiogram 6/2021 showed normal coronary arteries with no angiographic evidence of obstruction.  Last RHC:  10/2021 showed normal biventricular filling pressures, with RA 4, mPA 20, PCW 14, and CI 2.7.  Echo/cMRI: TTE 2/2022 showed stable graft function, with LVEF 55-60% and normal RV size/function.    Since his last visit, he states that he continues to feel well.  His BPs " have been elevated, ranging 140-150/90-100s in the morning prior to his morning meds, then come down to 130/80s.  He recently started walking again, noting that he did not exercise much during the winter.  His breathing remains well, and he is only SOB when climbing hills, but states that this is still better than how he felt with his LVAD.  He is sleeping in a bed, and uses 1 pillow without PND and orthopnea.  His appetite has been good, and he is eating regularly without nausea, vomiting, diarrhea, and constipation.  He does note harder stools, and that he has stool softeners available but has not taken them.  He is hydrating well, and notes intermittent fluid retention.  He takes torsemide when needed, mostly when he feels swollen feet.  He has taken torsemide the last 2 evenings.  His weight has been ranging ~197#, which is higher than his goal of 185#.  He noticed a floater in his eye recently, and believes that he is developing a cataract.  He has a cough, which is intermittent and seems to be worse at night.  Torsemide does not improve his cough.  He otherwise denies chest pain, palpitations, dizziness, falls, headaches, acute vision changes, fevers, chills, sore throat, and signs of bleeding.      Serostatus:  CMV D+/R-  EBV D-/R+  Toxo D-/R-       Patient Active Problem List    Diagnosis Date Noted     Status post coronary angiogram 06/23/2021     Priority: Medium     Elevated prostate specific antigen (PSA) 01/07/2021     Priority: Medium     Acute kidney failure, unspecified (H) 09/18/2020     Priority: Medium     Fever 08/31/2020     Priority: Medium     Lung mass 08/31/2020     Priority: Medium     Added automatically from request for surgery [id number]       Kidney stone 08/28/2020     Priority: Medium     Rhinovirus infection 08/20/2020     Priority: Medium     Chronic prostatitis without hematuria 08/19/2020     Priority: Medium     Prophylactic antibiotic 08/19/2020     Priority: Medium     Pneumonia  08/18/2020     Priority: Medium     Heart replaced by transplant (H) 05/26/2020     Priority: Medium     Added automatically from request for surgery [id number]       Biventricular heart failure (H) 05/17/2020     Priority: Medium     Added automatically from request for surgery [id number]       Status post heart transplantation (H) 05/17/2020     Priority: Medium     Added automatically from request for surgery [id number]       Hydronephrosis 05/12/2020     Priority: Medium     Chronic systolic congestive heart failure (H) 02/13/2019     Priority: Medium     Added automatically from request for surgery [id number]       Weakness 01/11/2019     Priority: Medium     Type 2 diabetes mellitus with hyperglycemia, with long-term current use of insulin (H) 07/20/2018     Priority: Medium     Left ventricular apical thrombus following MI (H) 07/06/2018     Priority: Medium     Hematuria 07/04/2018     Priority: Medium     Long term current use of anticoagulant 07/03/2018     Priority: Medium     Hypotension, unspecified hypotension type 07/02/2018     Priority: Medium     Monomorphic ventricular tachycardia (H) 07/02/2018     Priority: Medium     Overview:   Added automatically from request for surgery [id number]       ASCVD (arteriosclerotic cardiovascular disease) 06/27/2018     Priority: Medium     CKD (chronic kidney disease) stage 3, GFR 30-59 ml/min (H) 06/27/2018     Priority: Medium     Dyslipidemia 06/27/2018     Priority: Medium     ST elevation myocardial infarction involving left anterior descending (LAD) coronary artery (H) 06/27/2018     Priority: Medium     Type 2 diabetes mellitus with hyperglycemia (H) 06/27/2018     Priority: Medium     ANDREW (acute kidney injury) (H) 10/06/2016     Priority: Medium     Ureteral obstruction 10/06/2016     Priority: Medium     Unilateral inguinal hernia 09/09/2009     Priority: Medium     Overview:   IMO Update 10/11         PAST MEDICAL HISTORY:  Past Medical History:   Diagnosis Date      Acute kidney injury (H)      CKD (chronic kidney disease)      Coronary artery disease      Diabetes mellitus (H)      HTN (hypertension)      Hyperlipidemia      ICD (implantable cardioverter-defibrillator), single chamber- NOT dependent 7/17/2018     Ischemic cardiomyopathy      LV (left ventricular) mural thrombus      Paroxysmal atrial flutter (H)      RVF (right ventricular failure) (H)      Systolic heart failure (H)      Ventricular tachycardia (H)        CURRENT MEDICATIONS:  Prescription Medications as of 5/18/2022       Rx Number Disp Refills Start End Last Dispensed Date Next Fill Date Owning Pharmacy    ACCU-CHEK [place] PLUS test strip  400 strip 3 10/7/2021    [facility] ST[facility] #[id number] [facility] [address] AT [address]    Sig: Check BG 3 times daily at meals and at bedtime.    Class: E-Prescribe    acetaminophen (TYLENOL) 325 MG tablet     5/29/2020        Sig: Take 2 tablets (650 mg) by mouth every 4 hours as needed for other (multimodal surgical pain management along with NSAIDS and opioid medication as indicated based on pain control and physical function.)    Class: OTC    Route: Oral    amLODIPine (NORVASC) 10 MG tablet  90 tablet 3 5/11/2022    [facility] STORE #[id number] [place] [address] [address] AT [address]    Sig: Take 1 tablet (10 mg) by mouth daily    Class: E-Prescribe    Route: Oral    BD SILVANA U/F 32G X 4 MM insulin pen needle  600 each 3 11/23/2021    [facility] [address] [place] [address] [address]    Sig: Use 6 times daily.    Class: E-Prescribe    calcium carbonate 600 mg-vitamin D 400 units (CALTRATE) 600-400 MG-UNIT per tablet     5/29/2020        Sig: Take 1 tablet by mouth 2 times daily (with meals)    Class: OTC    Route: Oral    Renewals     Renewal requests to authorizing provider (Damari Ohara APRN CNP) <b>prohibited</b>          dapagliflozin (FARXIGA) 10 MG TABS tablet  90 tablet 3 3/4/2022    [facility]  STORE #[id number] - [facility] [address] AT [address]    Sig: Take 1 tablet (10 mg) by mouth daily    Class: E-Prescribe    Route: Oral    everolimus (ZORTRESS) 0.5 MG tablet  630 tablet 3 12/20/2021        Sig: Take 4 tablets (2 mg) by mouth every morning AND 3 tablets (1.5 mg) every evening.    Class: Local Print    Notes to Pharmacy: TXP DT 5/18/2020 (Heart) TXP Dischg DT 5/29/2020 DX Heart replaced by transplant Z94.1 TX [facility] ([place], [place])    Route: Oral    Prior authorization: Closed    HUMALOG KWIKPEN 100 UNIT/ML soln  75 mL 1 5/9/2022    [facility] #[id number] - [place] [address] AT [address]    Sig: INJECT ONE UNIT PER EVERY 5 GRAMS OF CARBS WITH MEALS AND SNACKS, PLUS CORRECTION, APPROX 70 UNITS IN 24 HOURS    Class: E-Prescribe    hydrALAZINE (APRESOLINE) 50 MG tablet  540 tablet 3 11/23/2021    [facility] #[id number] - [place] [address] AT [address]    Sig: Take 2 tablets (100 mg) by mouth 3 times daily    Class: E-Prescribe    Route: Oral    insulin glargine (LANTUS PEN) 100 UNIT/ML pen  15 mL 3 3/4/2022    [facility] #[id number] - [place] [address] AT [address]    Sig: Inject 32 units subcutaneous at .    Class: Historical    Notes to Pharmacy: If Lantus is not covered by insurance, may substitute Basaglar or Semglee or other insulin glargine product per insurance preference at same dose and frequency.      loperamide (IMODIUM) 2 MG capsule  60 capsule 0 9/24/2020    [facility] - [place], [place] - [address] [facility]    Sig: Take 1 capsule (2 mg) by mouth 4 times daily as needed for diarrhea    Class: E-Prescribe    Route: Oral    magnesium oxide (MAG-OX) 400 (241.3 Mg) MG tablet  90 tablet 3 10/26/2021    [facility] #[id number] - [facility] [address] AT [facility] [address]    Sig: Take 1 tablet (400 mg) by mouth daily    Class: E-Prescribe     Route: Oral    multivitamin w/minerals (THERA-VIT-M) tablet  90 tablet 3 9/24/2020    [facility] - [place], [place] - [address] [facility]    Sig: Take 1 tablet by mouth daily    Class: E-Prescribe    Route: Oral    mycophenolate (GENERIC EQUIVALENT) 250 MG capsule  240 capsule 11 9/21/2021    [facility] #[id number] - [place], [place] - [address] AT [address]    Sig: Take 4 capsules (1,000 mg) by mouth 2 times daily    Class: E-Prescribe    Notes to Pharmacy: TXP DT 5/18/2020 (Heart) TXP Dischg DT 5/29/2020 DX Heart transplant Z94.1 TX Center [facility] ([place], [place])    Route: Oral    Prior authorization: Closed    pantoprazole (PROTONIX) 40 MG EC tablet  90 tablet 3 11/23/2021    [id number] - [place], [place] - [address] AT [address]    Sig: Take 1 tablet (40 mg) by mouth every morning (before breakfast)    Class: E-Prescribe    Route: Oral    potassium chloride ER (KLOR-CON M) 20 MEQ CR tablet  90 tablet 3 10/7/2021    [id number] - [place], [place] - [address] AT [address]    Sig: Take 1 tablet (20 mEq) by mouth daily    Class: E-Prescribe    Route: Oral    rosuvastatin (CRESTOR) 10 MG tablet  90 tablet 3 10/7/2021    [facility] #[id number] - [place], [place] - [address] AT [address]    Sig: Take 1 tablet (10 mg) by mouth daily    Class: E-Prescribe    Route: Oral    torsemide (DEMADEX) 20 MG tablet  90 tablet 3 6/23/2021    [id number] [place] - [address] AT [address]    Sig: Take 1 tablet (20 mg) by mouth daily    Class: E-Prescribe    Route: Oral      Clinic-Administered Medications as of 5/18/2022       Dose Frequency Start End    perflutren diluted 1mL to 2mL with saline (OPTISON) diluted injection 6 mL (Completed) 6 mL ONCE 5/18/2022 5/18/2022    Route: Intravenous          ROS:   Constitutional: No fever, chills, or sweats. Weight gain.   ENT: No  "visual disturbance, ear ache, epistaxis, sore throat.   Allergies/Immunologic: Negative.   Respiratory: As per HPI.  Cardiovascular: As per HPI.   GI: No nausea, vomiting, hematemesis, melena, or hematochezia.   : No urinary frequency, dysuria, or hematuria.   Integument: Negative.   Psychiatric: Negative.   Neuro: Negative.   Endocrinology: Negative.   Musculoskeletal: Negative.    Exam:  /81 (BP Location: Right arm, Patient Position: Chair, Cuff Size: Adult Regular)   Pulse 114   Ht 1.64 m (5' 4.57\")   Wt 91.6 kg (202 lb)   SpO2 95%   BMI 34.07 kg/m    In general, the patient is a pleasant male in no apparent distress.    HEENT: NC/AT. PERRLA. EOMI.  Sclerae white, not injected.    Neck:  No adenopathy, No thyromegaly.    COR: JVD at clavicle when sitting upright.  RRR.  Normal S1 S2 splits physiologically.  No murmur, rub click, or gallop.    Lungs:  CTA. No rhonchi.    Abdomen: soft, nontender, nondistended.  No organomegaly.  Extremities:  No clubbing, cyanosis, or LE edema.    Neuro: Alert & Oriented x 3, grossly non focal.  Integument: no open lesions, rashes, or jaundice.    Labs:  CBC RESULTS:   Lab Results   Component Value Date    WBC 6.8 05/18/2022    WBC 5.3 06/23/2021    RBC 5.04 05/18/2022    RBC 4.22 (L) 06/23/2021    HGB 14.6 05/18/2022    HGB 10.1 (L) 06/23/2021    HCT 41.3 05/18/2022    HCT 34.6 (L) 06/23/2021    MCV 82 05/18/2022    MCV 82 06/23/2021    MCH 29.0 05/18/2022    MCH 26.3 (L) 06/23/2021    MCHC 35.4 05/18/2022    MCHC 32.1 06/23/2021    RDW 13.7 05/18/2022    RDW 12.7 06/23/2021     05/18/2022     06/23/2021       BMP RESULTS:  Lab Results   Component Value Date     02/23/2022     06/23/2021    POTASSIUM 3.5 02/23/2022    POTASSIUM 3.6 06/23/2021    CHLORIDE 107 03/23/2022    CHLORIDE 105 06/23/2021    CO2 21 02/23/2022    CO2 24 06/23/2021    ANIONGAP 11 02/23/2022    ANIONGAP 7 06/23/2021     (H) 02/23/2022     (H) 06/23/2021    " BUN 38 (H) 02/23/2022    BUN 35 (H) 06/23/2021    CR 1.89 (H) 02/23/2022    CR 1.93 (H) 06/23/2021    GFRESTIMATED 40 (L) 02/23/2022    GFRESTIMATED 37 (L) 06/23/2021    GFRESTBLACK 43 (L) 06/23/2021    ARLENE 8.9 02/23/2022    ARLENE 9.3 06/23/2021      LIPID RESULTS:  Lab Results   Component Value Date    CHOL 205 (H) 10/26/2021    CHOL 168 06/23/2021    HDL 40 10/26/2021    HDL 49 06/23/2021    LDL  10/26/2021      Comment:      Cannot estimate LDL when triglyceride exceeds 400 mg/dL     (H) 10/26/2021    LDL 75 06/23/2021    TRIG 451 (H) 10/26/2021    TRIG 220 (H) 06/23/2021    NHDL 165 (H) 10/26/2021    NHDL 119 06/23/2021       IMMUNOSUPPRESSANT LEVELS  Lab Results   Component Value Date    TACROL <3.0 (L) 02/23/2022    TACROL 3.5 (L) 06/23/2021    DOSTAC  02/23/2022      Comment:      Last dose information not provided.    DOSTAC Not Provided 06/23/2021       No components found for: CK  Lab Results   Component Value Date    MAG 2.0 02/23/2022    MAG 1.9 06/23/2021     Lab Results   Component Value Date    A1C 8.6 (H) 02/23/2022    A1C 10.3 (H) 06/23/2021     Lab Results   Component Value Date    PHOS 2.7 02/23/2022    PHOS 3.4 06/23/2021     Lab Results   Component Value Date    NTBNPI 1,673 (H) 08/18/2020     Lab Results   Component Value Date    SAITESTMET SA FCS 06/23/2021    SAICELL Class I 06/23/2021    [id number] Cw:15 06/23/2021    [id number] Cw:18 06/23/2021    SAIREPCOM  06/23/2021      Test performed by modified procedure. Serum heat inactivated and tested   by a modified ([place]) protocol including fetal calf serum addition.   High-risk, mfi >3,000. Mod-risk, mfi 500-3,000.       Lab Results   Component Value Date    SAIITESTME SA FCS 06/23/2021    SAIICELL Class II 06/23/2021    MD0NCHNFC None 06/23/2021    YM2WRNGEND None 06/23/2021    SAIIREPCOM  06/23/2021      Test performed by modified procedure. Serum heat inactivated and tested   by a modified (Olean) protocol including fetal calf serum  "addition.   High-risk, mfi >3,000. Mod-risk, mfi 500-3,000.       Lab Results   Component Value Date    CSPEC Plasma 06/23/2021       Assessment and Plan:   \"Anju Sharp is a 61yr old male with a history of CAD (STEMI with ALYSSA to LAD 6/27/18), ICM (LVEF 20-25%, s/p HM3 LVAD 12/31/18) s/p OHT 5/18/20, monomorphic VT (s/p ICD with shocks), LV thrombus, CKD, elevated PSA, pAF, HTN, HL, and DMII who presents to clinic for routine follow up.    Labs today show K 3.5.  Other electrolytes, renal function, liver function, and blood counts remain stable.  FLP shows markedly elevated triglycerides of 1670.  HgbA1c 8.3%.  CMV, EBV, DSAs, ImmuKnow, AlloMap, and everolimus levels are in process.    Echo results show mild decrease in bi-v function, with LVEF reported at 40-45%.    CXR report in progress, but images do not show any acute changes.    He will have a coronary angio, RHC, and biopsy following today's appt.    Mr. Sharp appears stable.  His BPs today are controlled.  His weight is up, and he appears euvolemic on exam.  Discussed that I will review his RHC, and determine what torsemide and KCl dosing should be thereafter.    It appears that he has not been taking aspirin, so advised that he start aspirin 81mg daily for CAV ppx.    Will update Dr. Ortiz re: his lowered LVEF per echo today, while we await the remainder of his results (angio, biopsy, etc).    Will also refer him to either Dr. Dailey or Dr. Danna Mosley re: his hypertriglyceridemia.  Also asked that he discuss his BG control with his PCP.    He will otherwise follow-up in clinic per protocol, or sooner should new concerns arise.      ICM, s/p OHT 5/18/20  His post-transplant course was c/b NSVT (with no hemodynamic compromise), leukocytosis with C Acnes growing from his former LVAD site (thought to be a contaminant, but treated with IV vanco --> po doxy through 6/1/20, 14d course total), and in the setting of donor blood growing S angirezasus and Veillonella " (also thought to be a contaminant, but treated with Vanco/zosyn --> Vanco/Flagyl for a total of 7 days).  He also had hyperkalemia, which improved following kayexalate and stopping bactrim.      Rejection history:  none  AlloMap scores:  < 35  DSAs:  none  Coronary angio/Ischemic eval: Coronary angiogram 6/2021 showed normal coronary arteries with no angiographic evidence of obstruction.  Last RHC:  10/2021 showed normal biventricular filling pressures, with RA 4, mPA 20, PCW 14, and CI 2.7.  Echo/cMRI: TTE 2/2022 showed stable graft function, with LVEF 55-60% and normal RV size/function.    Serostatus:  - CMV D+/R-  - EBV D-/R+  - Toxo D-/R-     Immunosuppression:  - MMF 1000mg BID  - everolimus, goal level 6-8.  Level today pending.     PPx:  - CAV:  Continue rosuvastatin 10mg daily.  - GI:  Pantoprazole 40mg daily  - Osteoporosis:  Calcium/vitamin d supplements     Graft function:  - BPs:  Controlled, continue amlodipine 5mg daily and hydralazine 100mg TID  - fluid status:  Euvolemic, will review torsemide dosing after RHC today -- currently taking prn, has taken 2 doses in the last 2 days     Health maintenance:  - derm exam overdue  - dental exam overdue  - eye exam UTD, has developing cataracts  - COVID shots 3/3, need booster  - needs Evashield   - needs shingrix, insurance only covers 25%  - pneumonia shots -- advised that he discuss with PCP  - last colo 12/2018, due now  - following with urology    Post-obstructive versus aspiration pneumonia  RUL/suprahilar mass   +abiotrophia defectiva  He was readmitted 8/31/20-9/24/20 with fever, cough, diarrhea, and syncope.  He was found to have a post-obstructive vs aspiration pneumonia, RUL/suprahilar mass, and narrowing of multiple RUL bronchi.  He was initially treated as a fungal infection as his fungitelle was +, but he did not clinically improve, nor did the size of the mass change.  Ultimately, tissue culture from the RUL mass showed abiotrophia defectiva  (oral microbe), which was thought to be a contaminant, but did respond to antibiotics.  His MMF was decreased to 500mg twice daily, as ImmuKnow was noted to be low.  He completed 4 weeks of antibiotic therapy 10/11/20, and follow up chest CTs have shown improvement in the RUL infiltrate.     DMII  HgbA1c remains elevated, advised that he follow-up with endo.  He has started Farxiga.    Hypertriglyceridemia  Referring to Dr. Dailey vs Dr. Danna Mosley today.     Elevated PSA  Prostate MRI 5/2020 showed signs of BPH, he follows with urology.     Possible chronic prostatits  Treated with antibiotics for > 1 month between 8/2020-10/2020.        The above was reviewed with Mr. Sharp, who verbalized understanding and will call with further questions/concerns.    45 minutes spent with patient, along with preparing for visit, reviewing follow up, and completing documentation.      Karen Benson, LEX, FNP-BC, CHFN  Advanced Heart Failure Nurse Practitioner  [facility]  Patient Care Team:  Milad Fry MD as PCP - General (Family Practice)  Antonia Byrne, CARMEN CNP as Nurse Practitioner (Cardiology)  Jenni Ortiz MD as MD (Cardiology)  Megan Ibarra MD as MD (Urology)  Nina Gutierrez, RN as Specialty Care Coordinator (Urology)  Angelika Muller, RN as Transplant Coordinator (Cardiology)  Xavi Mckeon [facility] as Pharmacist (Pharmacist)  Jeannette Schafer PA-C as Physician Assistant (INTERNAL MEDICINE - ENDOCRINOLOGY, DIABETES & METABOLISM)  Tavares Rodriguez MD as Assigned Infectious Disease Provider  Jeannette Schafer PA-C as Assigned Endocrinology Provider  Donny Gomes MD as Assigned Cancer Care Provider  Kenton Carlos MD as Assigned Surgical Provider  Jenni Ortiz MD as Assigned Heart and Vascular Provider  SELF, REFERRED

## 2022-05-18 NOTE — Clinical Note
5/18/2022      RE: Mariusz Sharp  [address] [facility] [address]       Dear Colleague,    Thank you for the opportunity to participate in the care of your patient, Mariusz Sharp, at the [facility] [place] [facility]. Please see a copy of my visit note below.    ADULT HEART TRANSPLANT CLINIC  May 18, 2022    HPI:     Feeling well  BPs high -- ranging 140-150/90-100s in the morning prior to pills  Down 130/80s  Just started walking again, didn't do much during winter  Breathing well, only SOB when walking up hill, better than when had LVAD  No pnd orthopnea, sleeping in a bed 1 pillow  Appetite good, eating regularly no nvdc but notes hard stools, has stool softeners not taking  Hydrating well, notes intermittent fluid retention  Takes torsemide as needed, feels swollen feet  Has taken torsemide the last 2 eves  Sometimes swelling persists in to the morning  Weight hanging 197#, higher than usual, goal 185#  Weight was 180 10/2021  No cp palps dizzy falls headaches acute vision changes   Had a floater the other day, has a cataract coming in  Has a cough, intermittent, seems more towards night, not seasonal, not better with torsemide  No fevers chills sore throat    - derm exam overdue  - dental exam overdue  - eye exam UTD, has developing cataracts  - COVID shots 3/3, need booster  - needs Evashield   - needs shingrix, insurance only covers 25%  - pneumonia shots -- advised that he discuss with PCP  - last colo 12/2018, due now  - following with urology    --> restart asa 81mg daily  --> determine torse and kcl after RHC        Serostatus:  CMV D+/R-  EBV D-/R+  Toxo D-/R-       Patient Active Problem List    Diagnosis Date Noted     Status post coronary angiogram 06/23/2021     Priority: Medium     Elevated prostate specific antigen (PSA) 01/07/2021     Priority: Medium     Acute kidney failure, unspecified (H) 09/18/2020     Priority: Medium      Fever 08/31/2020     Priority: Medium     Lung mass 08/31/2020     Priority: Medium     Added automatically from request for surgery [id number]       Kidney stone 08/28/2020     Priority: Medium     Rhinovirus infection 08/20/2020     Priority: Medium     Chronic prostatitis without hematuria 08/19/2020     Priority: Medium     Prophylactic antibiotic 08/19/2020     Priority: Medium     Pneumonia 08/18/2020     Priority: Medium     Heart replaced by transplant (H) 05/26/2020     Priority: Medium     Added automatically from request for surgery [id number]       Biventricular heart failure (H) 05/17/2020     Priority: Medium     Added automatically from request for surgery [id number]       Status post heart transplantation (H) 05/17/2020     Priority: Medium     Added automatically from request for surgery [id number]       Hydronephrosis 05/12/2020     Priority: Medium     Chronic systolic congestive heart failure (H) 02/13/2019     Priority: Medium     Added automatically from request for surgery [id number]       Weakness 01/11/2019     Priority: Medium     Type 2 diabetes mellitus with hyperglycemia, with long-term current use of insulin (H) 07/20/2018     Priority: Medium     Left ventricular apical thrombus following MI (H) 07/06/2018     Priority: Medium     Hematuria 07/04/2018     Priority: Medium     Long term current use of anticoagulant 07/03/2018     Priority: Medium     Hypotension, unspecified hypotension type 07/02/2018     Priority: Medium     Monomorphic ventricular tachycardia (H) 07/02/2018     Priority: Medium     Overview:   Added automatically from request for surgery [id number]       ASCVD (arteriosclerotic cardiovascular disease) 06/27/2018     Priority: Medium     CKD (chronic kidney disease) stage 3, GFR 30-59 ml/min (H) 06/27/2018     Priority: Medium     Dyslipidemia 06/27/2018     Priority: Medium     ST elevation myocardial infarction involving left anterior descending (LAD) coronary artery (H) 06/27/2018      Priority: Medium     Type 2 diabetes mellitus with hyperglycemia (H) 06/27/2018     Priority: Medium     ANDREW (acute kidney injury) (H) 10/06/2016     Priority: Medium     Ureteral obstruction 10/06/2016     Priority: Medium     Unilateral inguinal hernia 09/09/2009     Priority: Medium     Overview:   IMO Update 10/11         PAST MEDICAL HISTORY:  Past Medical History:   Diagnosis Date     Acute kidney injury (H)      CKD (chronic kidney disease)      Coronary artery disease      Diabetes mellitus (H)      HTN (hypertension)      Hyperlipidemia      ICD (implantable cardioverter-defibrillator), single chamber- NOT dependent 7/17/2018     Ischemic cardiomyopathy      LV (left ventricular) mural thrombus      Paroxysmal atrial flutter (H)      RVF (right ventricular failure) (H)      Systolic heart failure (H)      Ventricular tachycardia (H)        CURRENT MEDICATIONS:  Prescription Medications as of 5/18/2022       Rx Number Disp Refills Start End Last Dispensed Date Next Fill Date Owning Pharmacy    [place] [place] PLUS test strip  400 strip 3 10/7/2021    [id number] [place], [place] - [address] AT [address]    Sig: Check BG 3 times daily at meals and at bedtime.    Class: E-Prescribe    acetaminophen (TYLENOL) 325 MG tablet     5/29/2020        Sig: Take 2 tablets (650 mg) by mouth every 4 hours as needed for other (multimodal surgical pain management along with NSAIDS and opioid medication as indicated based on pain control and physical function.)    Class: OTC    Route: Oral    amLODIPine (NORVASC) 10 MG tablet  90 tablet 3 5/11/2022    [id number] [address] AT [address]    Sig: Take 1 tablet (10 mg) by mouth daily    Class: E-Prescribe    Route: Oral    BD SILVANA U/F 32G X 4 MM insulin pen needle  600 each 3 11/23/2021    [facility] #[id number] [facility], [place] - [address] AT [address]    Sig: Use 6 times daily.     Class: E-Prescribe    calcium carbonate 600 mg-vitamin D 400 units (CALTRATE) 600-400 MG-UNIT per tablet     5/29/2020        Sig: Take 1 tablet by mouth 2 times daily (with meals)    Class: OTC    Route: Oral    Renewals     Renewal requests to authorizing provider (Damari Ohara APRN CNP) <b>prohibited</b>          dapagliflozin (FARXIGA) 10 MG TABS tablet  90 tablet 3 3/4/2022    [facility] #[id number] - [place], [place] - [address] AT [address]    Sig: Take 1 tablet (10 mg) by mouth daily    Class: E-Prescribe    Route: Oral    everolimus (ZORTRESS) 0.5 MG tablet  630 tablet 3 12/20/2021        Sig: Take 4 tablets (2 mg) by mouth every morning AND 3 tablets (1.5 mg) every evening.    Class: Local Print    Notes to Pharmacy: TXP DT 5/18/2020 (Heart) TXP Dischg DT 5/29/2020 DX Heart replaced by transplant Z94.1 [facility] ([place], [place])    Route: Oral    Prior authorization: Closed    HUMALOG KWIKPEN 100 UNIT/ML soln  75 mL 1 5/9/2022    [id number] - [facility] [address] AT [address]    Sig: INJECT ONE UNIT PER EVERY 5 GRAMS OF CARBS WITH MEALS AND SNACKS, PLUS CORRECTION, APPROX 70 UNITS IN 24 HOURS    Class: E-Prescribe    hydrALAZINE (APRESOLINE) 50 MG tablet  540 tablet 3 11/23/2021    [facility] #[id number] - [place], [place] - [facility] AT [address]    Sig: Take 2 tablets (100 mg) by mouth 3 times daily    Class: E-Prescribe    Route: Oral    insulin glargine (LANTUS PEN) 100 UNIT/ML pen  15 mL 3 3/4/2022    [id number] - [place], [place] - [address] AT [address]    Sig: Inject 32 units subcutaneous at .    Class: Historical    Notes to Pharmacy: If Lantus is not covered by insurance, may substitute Basaglar or Semglee or other insulin glargine product per insurance preference at same dose and frequency.      loperamide (IMODIUM) 2 MG capsule  60 capsule 0 9/24/2020    [facility] [address]    Sig: Take 1 capsule (2 mg) by mouth 4 times daily as needed for diarrhea    Class: E-Prescribe    Route: Oral    magnesium oxide (MAG-OX) 400 (241.3 Mg) MG tablet  90 tablet 3 10/26/2021    [facility] #[id number] - [facility] [address] AT [address]    Sig: Take 1 tablet (400 mg) by mouth daily    Class: E-Prescribe    Route: Oral    multivitamin w/minerals (THERA-VIT-M) tablet  90 tablet 3 9/24/2020    [facility] [address]    Sig: Take 1 tablet by mouth daily    Class: E-Prescribe    Route: Oral    mycophenolate (GENERIC EQUIVALENT) 250 MG capsule  240 capsule 11 9/21/2021    [facility] #[id number] [facility] [address] AT [address]    Sig: Take 4 capsules (1,000 mg) by mouth 2 times daily    Class: E-Prescribe    Notes to Pharmacy: TXP DT 5/18/2020 (Heart) TXP Dischg DT 5/29/2020 DX Heart transplant Z94.1 TX Center [facility] ([place], [place])    Route: Oral    Prior authorization: Closed    pantoprazole (PROTONIX) 40 MG EC tablet  90 tablet 3 11/23/2021    [facility] ST[facility] #[id number] - [facility] [address] AT [address]    Sig: Take 1 tablet (40 mg) by mouth every morning (before breakfast)    Class: E-Prescribe    Route: Oral    potassium chloride ER (KLOR-CON M) 20 MEQ CR tablet  90 tablet 3 10/7/2021    [id number] [address] AT [address]    Sig: Take 1 tablet (20 mEq) by mouth daily    Class: E-Prescribe    Route: Oral    rosuvastatin (CRESTOR) 10 MG tablet  90 tablet 3 10/7/2021    [facility] #[id number] - [place], [place] - [address] AT [address]    Sig: Take 1 tablet (10 mg) by mouth daily    Class: E-Prescribe    Route: Oral    torsemide (DEMADEX) 20 MG tablet  90 tablet 3 6/23/2021    [facility] #[id number] - [place], [place] - [address] AT [facility]  [address]    Sig: Take 1 tablet (20 mg) by mouth daily    Class: E-Prescribe    Route: Oral      Clinic-Administered Medications as of 5/18/2022       Dose Frequency Start End    perflutren diluted 1mL to 2mL with saline (OPTISON) diluted injection 6 mL (Completed) 6 mL ONCE 5/18/2022 5/18/2022    Route: Intravenous          ROS:   Constitutional: No fever, chills, or sweats. No weight gain/loss.   ENT: No visual disturbance, ear ache, epistaxis, sore throat.   Allergies/Immunologic: Negative.   Respiratory: No cough, hemoptysis.   Cardiovascular: As per HPI.   GI: No nausea, vomiting, hematemesis, melena, or hematochezia.   : No urinary frequency, dysuria, or hematuria.   Integument: Negative.   Psychiatric: Negative.   Neuro: Negative.   Endocrinology: Negative.   Musculoskeletal: Negative    Exam:  /81 (BP Location: Right arm, Patient Position: Chair, Cuff Size: Adult Regular)   Pulse 114   Ht 1.64 m (5' 4.57\")   Wt 91.6 kg (202 lb)   SpO2 95%   BMI 34.07 kg/m    In general, the patient is a pleasant male in no apparent distress.    HEENT: NC/AT. PERRLA. EOMI.  Sclerae white, not injected.    Neck:  No adenopathy, No thyromegaly.    COR: No jugular venous distention.  RRR.  Normal S1 S2 splits physiologically.  No murmur, rub click, or gallop.    Lungs:  CTA. No rhonchi.    Abdomen: soft, nontender, nondistended.  No organomegaly.  Extremities:  No clubbing, cyanosis, or edema.    Neuro: Alert & Oriented x 3, grossly non focal.    Labs:  CBC RESULTS:   Lab Results   Component Value Date    WBC 6.8 05/18/2022    WBC 5.3 06/23/2021    RBC 5.04 05/18/2022    RBC 4.22 (L) 06/23/2021    HGB 14.6 05/18/2022    HGB 10.1 (L) 06/23/2021    HCT 41.3 05/18/2022    HCT 34.6 (L) 06/23/2021    MCV 82 05/18/2022    MCV 82 06/23/2021    MCH 29.0 05/18/2022    MCH 26.3 (L) 06/23/2021    MCHC 35.4 05/18/2022    MCHC 32.1 06/23/2021    RDW 13.7 05/18/2022    RDW 12.7 06/23/2021     05/18/2022     " 06/23/2021       BMP RESULTS:  Lab Results   Component Value Date     02/23/2022     06/23/2021    POTASSIUM 3.5 02/23/2022    POTASSIUM 3.6 06/23/2021    CHLORIDE 107 03/23/2022    CHLORIDE 105 06/23/2021    CO2 21 02/23/2022    CO2 24 06/23/2021    ANIONGAP 11 02/23/2022    ANIONGAP 7 06/23/2021     (H) 02/23/2022     (H) 06/23/2021    BUN 38 (H) 02/23/2022    BUN 35 (H) 06/23/2021    CR 1.89 (H) 02/23/2022    CR 1.93 (H) 06/23/2021    GFRESTIMATED 40 (L) 02/23/2022    GFRESTIMATED 37 (L) 06/23/2021    GFRESTBLACK 43 (L) 06/23/2021    ARLENE 8.9 02/23/2022    ARLENE 9.3 06/23/2021      LIPID RESULTS:  Lab Results   Component Value Date    CHOL 205 (H) 10/26/2021    CHOL 168 06/23/2021    HDL 40 10/26/2021    HDL 49 06/23/2021    LDL  10/26/2021      Comment:      Cannot estimate LDL when triglyceride exceeds 400 mg/dL     (H) 10/26/2021    LDL 75 06/23/2021    TRIG 451 (H) 10/26/2021    TRIG 220 (H) 06/23/2021    NHDL 165 (H) 10/26/2021    NHDL 119 06/23/2021       IMMUNOSUPPRESSANT LEVELS  Lab Results   Component Value Date    TACROL <3.0 (L) 02/23/2022    TACROL 3.5 (L) 06/23/2021    DOSTAC  02/23/2022      Comment:      Last dose information not provided.    DOSTAC Not Provided 06/23/2021       No components found for: CK  Lab Results   Component Value Date    MAG 2.0 02/23/2022    MAG 1.9 06/23/2021     Lab Results   Component Value Date    A1C 8.6 (H) 02/23/2022    A1C 10.3 (H) 06/23/2021     Lab Results   Component Value Date    PHOS 2.7 02/23/2022    PHOS 3.4 06/23/2021     Lab Results   Component Value Date    NTBNPI 1,673 (H) 08/18/2020     Lab Results   Component Value Date    SAITESTMET SA FCS 06/23/2021    SAICELL Class I 06/23/2021    [id number] Cw:15 06/23/2021    [id number] Cw:18 06/23/2021    SAIREPCOM  06/23/2021      Test performed by modified procedure. Serum heat inactivated and tested   by a modified ([place]) protocol including fetal calf serum addition.   High-risk,  "mfi >3,000. Mod-risk, mfi 500-3,000.       Lab Results   Component Value Date    SAIITESTME SA FCS 06/23/2021    SAIICELL Class II 06/23/2021    [id number] None 06/23/2021    [id number] None 06/23/2021    SAIIREPCOM  06/23/2021      Test performed by modified procedure. Serum heat inactivated and tested   by a modified ([place]) protocol including fetal calf serum addition.   High-risk, mfi >3,000. Mod-risk, mfi 500-3,000.       Lab Results   Component Value Date    CSPEC Plasma 06/23/2021       Diagnostic Studies:  No results found for this or any previous visit (from the past 4320 hour(s)).    Assessment and Plan:  Change in immunosuppression: {YES NO:933553}  Reason for Change: {Not Applicable or free text:667014::\"***\"}  Other Changes: ***  Follow-Up: ***    Next Biopsy: ***  Next Angiogram: ***  Next Angiogram with IVUS: {YES NO:410211}  Next Stress Test: ***     CC  Patient Care Team:  Milad Fry MD as PCP - General (Family Practice)  Antonia Byrne APRN CNP as Nurse Practitioner (Cardiology)  Jenni Ortiz MD as MD (Cardiology)  Megan Ibarra MD as MD (Urology)  Nina Gutierrez RN as Specialty Care Coordinator (Urology)  Angelika Muller, RN as Transplant Coordinator (Cardiology)  Xavi Mckeon [facility] as Pharmacist (Pharmacist)  Jeannette Schafer PA-C as Physician Assistant (INTERNAL MEDICINE - ENDOCRINOLOGY, DIABETES & METABOLISM)  Tavares Rodriguez MD as Assigned Infectious Disease Provider  Jeannette Schafer PA-C as Assigned Endocrinology Provider  Donny Gomes MD as Assigned Cancer Care Provider  Kenton Carlos MD as Assigned Surgical Provider  Jenni Ortiz MD as Assigned Heart and Vascular Provider  SELF, REFERRED      Please do not hesitate to contact me if you have any questions/concerns.     Sincerely,     Karen Benson NP  "

## 2022-05-18 NOTE — PROGRESS NOTES
D/I/A:  Patient is tolerating liquids and foods, ambulating, urinating, puncture sites are stable ( no bleeding and no hematoma) and patient has a .  A+O x4 and making needs known.  CCL access sites C/D/I; no bleeding or hematoma; CMS intact.  VSSA.  ST on monitor.  IV access removed.  Education completed and outlined in AVS or handout: medications reviewed with patient.  Questions answered prior to discharge.  Belongings returned to patient at discharge.  TB and board removed after 60min.   P: Discharged to self care.  Patient to follow up with appts as per discharge instruction.    HZ=515/pt ate and gave himself 14 Units of Humalog subcutaneous fromhis  home supply (per his home correction scale and carb counting). Okay per CSI MD for pt give himself subcutaneous insulin.

## 2022-05-18 NOTE — NURSING NOTE
Chief Complaint   Patient presents with     Follow Up     Chronic systolic heart failure          Vitals were taken and medications reconciled.    Sam Gauthier, EMT  10:22 AM

## 2022-05-18 NOTE — NURSING NOTE
Transplant Coordinator Note    Reason for visit: 2nd annual  Coordinator: Present   Caregiver:  None at visit    Health concerns addressed today:  1. BP is up a little at home- 140-150/90's  2. Starting to exercise more- has been sedentary more over the Winter  3.       Immunosuppressants:  MMF 1000 BID  Evero 2/1.5    Routine screenings:    Derm: DUE  Dental: DUE  Colonoscopy:DUE now  Breast/Prostate:Follows with Urology  Eye: DUE  Flu/Pneumonia: Covid done- needs booster, needs Shingrix, Flu and pneumonia done.    Labs:       Additional Notes:         Labs, echo,  reviewed with patient  Medication record reviewed and reconciled  Questions and concerns addressed  Pt verbalized an understanding of plan of care.     Patient Instructions  1. Please get your Covid booster and ask your primary MD about the Shingrix and pneumonia vaccines.  2. You are due for a colonoscopy- please schedule through your Primary MD.  3. Restart a baby aspirin- 81mg daily.  4.  Angelika will call with remaining results for tests and labs.  5. Call [phone] to schedule Evusheld injections if you are unable to do so in [place].    Next transplant clinic appointment:  in 6 months for 30 month visit.  Next lab draw: In 6 months or sooner if needed.  Coordinator will call with all pending results.     Please call transplant coordinator with any questions:    Angelika Muller RN BSN   Post Heart Transplant Nurse Coordinator  [facility]  Questions: [phone]

## 2022-05-18 NOTE — LETTER
5/18/2022    RE: Mariusz Sharp  [address] [facility] [address]     ADULT HEART TRANSPLANT CLINIC  May 18, 2022    HPI:     Feeling well  BPs high -- ranging 140-150/90-100s in the morning prior to pills  Down 130/80s  Just started walking again, didn't do much during winter  Breathing well, only SOB when walking up hill, better than when had LVAD  No pnd orthopnea, sleeping in a bed 1 pillow  Appetite good, eating regularly no nvdc but notes hard stools, has stool softeners not taking  Hydrating well, notes intermittent fluid retention  Takes torsemide as needed, feels swollen feet  Has taken torsemide the last 2 eves  Sometimes swelling persists in to the morning  Weight hanging 197#, higher than usual, goal 185#  Weight was 180 10/2021  No cp palps dizzy falls headaches acute vision changes   Had a floater the other day, has a cataract coming in  Has a cough, intermittent, seems more towards night, not seasonal, not better with torsemide  No fevers chills sore throat    - derm exam overdue  - dental exam overdue  - eye exam UTD, has developing cataracts  - COVID shots 3/3, need booster  - needs Evashield   - needs shingrix, insurance only covers 25%  - pneumonia shots -- advised that he discuss with PCP  - last colo 12/2018, due now  - following with urology    --> restart asa 81mg daily  --> determine torse and kcl after RHC        Serostatus:  CMV D+/R-  EBV D-/R+  Toxo D-/R-       Patient Active Problem List    Diagnosis Date Noted     Status post coronary angiogram 06/23/2021     Priority: Medium     Elevated prostate specific antigen (PSA) 01/07/2021     Priority: Medium     Acute kidney failure, unspecified (H) 09/18/2020     Priority: Medium     Fever 08/31/2020     Priority: Medium     Lung mass 08/31/2020     Priority: Medium     Added automatically from request for surgery [id number]       Kidney stone 08/28/2020     Priority: Medium     Rhinovirus infection 08/20/2020     Priority: Medium      Chronic prostatitis without hematuria 08/19/2020     Priority: Medium     Prophylactic antibiotic 08/19/2020     Priority: Medium     Pneumonia 08/18/2020     Priority: Medium     Heart replaced by transplant (H) 05/26/2020     Priority: Medium     Added automatically from request for surgery [id number]       Biventricular heart failure (H) 05/17/2020     Priority: Medium     Added automatically from request for surgery [id number]       Status post heart transplantation (H) 05/17/2020     Priority: Medium     Added automatically from request for surgery [id number]       Hydronephrosis 05/12/2020     Priority: Medium     Chronic systolic congestive heart failure (H) 02/13/2019     Priority: Medium     Added automatically from request for surgery [id number]       Weakness 01/11/2019     Priority: Medium     Type 2 diabetes mellitus with hyperglycemia, with long-term current use of insulin (H) 07/20/2018     Priority: Medium     Left ventricular apical thrombus following MI (H) 07/06/2018     Priority: Medium     Hematuria 07/04/2018     Priority: Medium     Long term current use of anticoagulant 07/03/2018     Priority: Medium     Hypotension, unspecified hypotension type 07/02/2018     Priority: Medium     Monomorphic ventricular tachycardia (H) 07/02/2018     Priority: Medium     Overview:   Added automatically from request for surgery [id number]       ASCVD (arteriosclerotic cardiovascular disease) 06/27/2018     Priority: Medium     CKD (chronic kidney disease) stage 3, GFR 30-59 ml/min (H) 06/27/2018     Priority: Medium     Dyslipidemia 06/27/2018     Priority: Medium     ST elevation myocardial infarction involving left anterior descending (LAD) coronary artery (H) 06/27/2018     Priority: Medium     Type 2 diabetes mellitus with hyperglycemia (H) 06/27/2018     Priority: Medium     ANDREW (acute kidney injury) (H) 10/06/2016     Priority: Medium     Ureteral obstruction 10/06/2016     Priority: Medium     Unilateral inguinal  hernia 09/09/2009     Priority: Medium     Overview:   IMO Update 10/11         PAST MEDICAL HISTORY:  Past Medical History:   Diagnosis Date     Acute kidney injury (H)      CKD (chronic kidney disease)      Coronary artery disease      Diabetes mellitus (H)      HTN (hypertension)      Hyperlipidemia      ICD (implantable cardioverter-defibrillator), single chamber- NOT dependent 7/17/2018     Ischemic cardiomyopathy      LV (left ventricular) mural thrombus      Paroxysmal atrial flutter (H)      RVF (right ventricular failure) (H)      Systolic heart failure (H)      Ventricular tachycardia (H)        CURRENT MEDICATIONS:  Prescription Medications as of 5/18/2022       Rx Number Disp Refills Start End Last Dispensed Date Next Fill Date Owning Pharmacy    [facility] [place] PL[place] test strip  400 strip 3 10/7/2021    [facility][id number] [facility] [address] AT [address]    Sig: Check BG 3 times daily at meals and at bedtime.    Class: E-Prescribe    acetaminophen (TYLENOL) 325 MG tablet     5/29/2020        Sig: Take 2 tablets (650 mg) by mouth every 4 hours as needed for other (multimodal surgical pain management along with NSAIDS and opioid medication as indicated based on pain control and physical function.)    Class: OTC    Route: Oral    amLODIPine (NORVASC) 10 MG tablet  90 tablet 3 5/11/2022    [facility] #[id number] [place] [address] [address] AT [address]    Sig: Take 1 tablet (10 mg) by mouth daily    Class: E-Prescribe    Route: Oral    BD SILVANA U/F 32G X 4 MM insulin pen needle  600 each 3 11/23/2021    [facility] #[id number] [facility] [address] AT [address]    Sig: Use 6 times daily.    Class: E-Prescribe    calcium carbonate 600 mg-vitamin D 400 units (CALTRATE) 600-400 MG-UNIT per tablet     5/29/2020        Sig: Take 1 tablet by mouth 2 times daily (with meals)    Class: OTC    Route: Oral    Renewals     Renewal requests to  authorizing provider (Damari Ohara APRN CNP) <b>prohibited</b>          dapagliflozin (FARXIGA) 10 MG TABS tablet  90 tablet 3 3/4/2022    [id number] - [facility] [address] AT [address]    Sig: Take 1 tablet (10 mg) by mouth daily    Class: E-Prescribe    Route: Oral    everolimus (ZORTRESS) 0.5 MG tablet  630 tablet 3 12/20/2021        Sig: Take 4 tablets (2 mg) by mouth every morning AND 3 tablets (1.5 mg) every evening.    Class: Local Print    Notes to Pharmacy: TXP DT 5/18/2020 (Heart) TXP Dischg DT 5/29/2020 DX Heart replaced by transplant Z94.1 TX [facility] ([place], [place])    Route: Oral    Prior authorization: Closed    HUMALOG KWIKPEN 100 UNIT/ML soln  75 mL 1 5/9/2022    [facility] #[id number] - [facility] [address] AT [address]    Sig: INJECT ONE UNIT PER EVERY 5 GRAMS OF CARBS WITH MEALS AND SNACKS, PLUS CORRECTION, APPROX 70 UNITS IN 24 HOURS    Class: E-Prescribe    hydrALAZINE (APRESOLINE) 50 MG tablet  540 tablet 3 11/23/2021    [facility] #[id number] - [facility] [address] AT [address]    Sig: Take 2 tablets (100 mg) by mouth 3 times daily    Class: E-Prescribe    Route: Oral    insulin glargine (LANTUS PEN) 100 UNIT/ML pen  15 mL 3 3/4/2022    [facility] #[id number] - [place], [place] - [address] AT [address]    Sig: Inject 32 units subcutaneous at .    Class: Historical    Notes to Pharmacy: If Lantus is not covered by insurance, may substitute Basaglar or Semglee or other insulin glargine product per insurance preference at same dose and frequency.      loperamide (IMODIUM) 2 MG capsule  60 capsule 0 9/24/2020    [facility] - [place], [place] - [address] [place] [address]    Sig: Take 1 capsule (2 mg) by mouth 4 times daily as needed for diarrhea    Class: E-Prescribe    Route: Oral    magnesium oxide (MAG-OX) 400 (241.3 Mg) MG tablet  90 tablet 3 10/26/2021     [facility] #[id number] - [place], [place] - [address] AT [address]    Sig: Take 1 tablet (400 mg) by mouth daily    Class: E-Prescribe    Route: Oral    multivitamin w/minerals (THERA-VIT-M) tablet  90 tablet 3 9/24/2020    [facility] - [place], [place] - [address] [facility]    Sig: Take 1 tablet by mouth daily    Class: E-Prescribe    Route: Oral    mycophenolate (GENERIC EQUIVALENT) 250 MG capsule  240 capsule 11 9/21/2021    [facility] #[id number] - [place], [place] - [address] AT [address]    Sig: Take 4 capsules (1,000 mg) by mouth 2 times daily    Class: E-Prescribe    Notes to Pharmacy: TXP DT 5/18/2020 (Heart) TXP Dischg DT 5/29/2020 DX Heart transplant Z94.1 TX [facility] ([place], [place])    Route: Oral    Prior authorization: Closed    pantoprazole (PROTONIX) 40 MG EC tablet  90 tablet 3 11/23/2021    [facility] #[id number] - [facility] [address] AT [address]    Sig: Take 1 tablet (40 mg) by mouth every morning (before breakfast)    Class: E-Prescribe    Route: Oral    potassium chloride ER (KLOR-CON M) 20 MEQ CR tablet  90 tablet 3 10/7/2021    [facility] #[id number] - [place], [place] - [address] AT [address]    Sig: Take 1 tablet (20 mEq) by mouth daily    Class: E-Prescribe    Route: Oral    rosuvastatin (CRESTOR) 10 MG tablet  90 tablet 3 10/7/2021    [facility] #[id number] - [place], [place] - [address] AT [address]    Sig: Take 1 tablet (10 mg) by mouth daily    Class: E-Prescribe    Route: Oral    torsemide (DEMADEX) 20 MG tablet  90 tablet 3 6/23/2021    [facility] #[id number] - [place], [place] - [address] AT [address]    Sig: Take 1 tablet (20 mg) by mouth daily    Class: E-Prescribe    Route: Oral      Clinic-Administered Medications as of 5/18/2022       Dose Frequency Start End    perflutren diluted 1mL to 2mL with saline (OPTISON) diluted injection 6  "mL (Completed) 6 mL ONCE 5/18/2022 5/18/2022    Route: Intravenous          ROS:   Constitutional: No fever, chills, or sweats. No weight gain/loss.   ENT: No visual disturbance, ear ache, epistaxis, sore throat.   Allergies/Immunologic: Negative.   Respiratory: No cough, hemoptysis.   Cardiovascular: As per HPI.   GI: No nausea, vomiting, hematemesis, melena, or hematochezia.   : No urinary frequency, dysuria, or hematuria.   Integument: Negative.   Psychiatric: Negative.   Neuro: Negative.   Endocrinology: Negative.   Musculoskeletal: Negative    Exam:  /81 (BP Location: Right arm, Patient Position: Chair, Cuff Size: Adult Regular)   Pulse 114   Ht 1.64 m (5' 4.57\")   Wt 91.6 kg (202 lb)   SpO2 95%   BMI 34.07 kg/m    In general, the patient is a pleasant male in no apparent distress.    HEENT: NC/AT. PERRLA. EOMI.  Sclerae white, not injected.    Neck:  No adenopathy, No thyromegaly.    COR: No jugular venous distention.  RRR.  Normal S1 S2 splits physiologically.  No murmur, rub click, or gallop.    Lungs:  CTA. No rhonchi.    Abdomen: soft, nontender, nondistended.  No organomegaly.  Extremities:  No clubbing, cyanosis, or edema.    Neuro: Alert & Oriented x 3, grossly non focal.    Labs:  CBC RESULTS:   Lab Results   Component Value Date    WBC 6.8 05/18/2022    WBC 5.3 06/23/2021    RBC 5.04 05/18/2022    RBC 4.22 (L) 06/23/2021    HGB 14.6 05/18/2022    HGB 10.1 (L) 06/23/2021    HCT 41.3 05/18/2022    HCT 34.6 (L) 06/23/2021    MCV 82 05/18/2022    MCV 82 06/23/2021    MCH 29.0 05/18/2022    MCH 26.3 (L) 06/23/2021    MCHC 35.4 05/18/2022    MCHC 32.1 06/23/2021    RDW 13.7 05/18/2022    RDW 12.7 06/23/2021     05/18/2022     06/23/2021       BMP RESULTS:  Lab Results   Component Value Date     02/23/2022     06/23/2021    POTASSIUM 3.5 02/23/2022    POTASSIUM 3.6 06/23/2021    CHLORIDE 107 03/23/2022    CHLORIDE 105 06/23/2021    CO2 21 02/23/2022    CO2 24 06/23/2021 "    ANIONGAP 11 02/23/2022    ANIONGAP 7 06/23/2021     (H) 02/23/2022     (H) 06/23/2021    BUN 38 (H) 02/23/2022    BUN 35 (H) 06/23/2021    CR 1.89 (H) 02/23/2022    CR 1.93 (H) 06/23/2021    GFRESTIMATED 40 (L) 02/23/2022    GFRESTIMATED 37 (L) 06/23/2021    GFRESTBLACK 43 (L) 06/23/2021    ARLENE 8.9 02/23/2022    ARLENE 9.3 06/23/2021      LIPID RESULTS:  Lab Results   Component Value Date    CHOL 205 (H) 10/26/2021    CHOL 168 06/23/2021    HDL 40 10/26/2021    HDL 49 06/23/2021    LDL  10/26/2021      Comment:      Cannot estimate LDL when triglyceride exceeds 400 mg/dL     (H) 10/26/2021    LDL 75 06/23/2021    TRIG 451 (H) 10/26/2021    TRIG 220 (H) 06/23/2021    NHDL 165 (H) 10/26/2021    NHDL 119 06/23/2021       IMMUNOSUPPRESSANT LEVELS  Lab Results   Component Value Date    TACROL <3.0 (L) 02/23/2022    TACROL 3.5 (L) 06/23/2021    DOSTAC  02/23/2022      Comment:      Last dose information not provided.    DOSTAC Not Provided 06/23/2021       No components found for: CK  Lab Results   Component Value Date    MAG 2.0 02/23/2022    MAG 1.9 06/23/2021     Lab Results   Component Value Date    A1C 8.6 (H) 02/23/2022    A1C 10.3 (H) 06/23/2021     Lab Results   Component Value Date    PHOS 2.7 02/23/2022    PHOS 3.4 06/23/2021     Lab Results   Component Value Date    NTBNPI 1,673 (H) 08/18/2020     Lab Results   Component Value Date    SAITESTMET SA FCS 06/23/2021    SAICELL Class I 06/23/2021    [id number] Cw:15 06/23/2021    [id number] Cw:18 06/23/2021    SAIREPCOM  06/23/2021      Test performed by modified procedure. Serum heat inactivated and tested   by a modified ([place]) protocol including fetal calf serum addition.   High-risk, mfi >3,000. Mod-risk, mfi 500-3,000.       Lab Results   Component Value Date    SAIITESTME SA FCS 06/23/2021    SAIICELL Class II 06/23/2021    [id number] None 06/23/2021    [id number] None 06/23/2021    SAIIREPCOM  06/23/2021      Test performed by modified  "procedure. Serum heat inactivated and tested   by a modified ([place]) protocol including fetal calf serum addition.   High-risk, mfi >3,000. Mod-risk, mfi 500-3,000.       Lab Results   Component Value Date    CSPEC Plasma 06/23/2021       Diagnostic Studies:  No results found for this or any previous visit (from the past 4320 hour(s)).    Assessment and Plan:  Change in immunosuppression: {YES NO:960193}  Reason for Change: {Not Applicable or free text:757829::\"***\"}  Other Changes: ***  Follow-Up: ***    Next Biopsy: ***  Next Angiogram: ***  Next Angiogram with IVUS: {YES NO:749255}  Next Stress Test: ***     CC  Patient Care Team:  Milad Fry MD as PCP - General (Family Practice)  Antonia Byrne APRN CNP as Nurse Practitioner (Cardiology)  Jenni Ortiz MD as MD (Cardiology)  Megan Ibarra MD as MD (Urology)  Nina Gutierrez RN as Specialty Care Coordinator (Urology)  Angelika Muller, RN as Transplant Coordinator (Cardiology)  Xavi Mckeon [facility] as Pharmacist (Pharmacist)  Jeannette Schafer PA-C as Physician Assistant (INTERNAL MEDICINE - ENDOCRINOLOGY, DIABETES & METABOLISM)  Tavares Rodriguez MD as Assigned Infectious Disease Provider  Jeannette Schafer PA-C as Assigned Endocrinology Provider  Donny Gomes MD as Assigned Cancer Care Provider  Kenton Carlos MD as Assigned Surgical Provider  Jenni Ortiz MD as Assigned Heart and Vascular Provider  SELF, REFERRED      Karen Benson NP    "

## 2022-05-19 ENCOUNTER — TELEPHONE (OUTPATIENT)
Dept: TRANSPLANT | Facility: CLINIC | Age: 62
End: 2022-05-19
Payer: COMMERCIAL

## 2022-05-19 LAB
ATRIAL RATE - MUSE: 114 BPM
DIASTOLIC BLOOD PRESSURE - MUSE: NORMAL MMHG
EBV DNA # SPEC NAA+PROBE: NOT DETECTED COPIES/ML
INTERPRETATION ECG - MUSE: NORMAL
P AXIS - MUSE: 111 DEGREES
PATH REPORT.COMMENTS IMP SPEC: NORMAL
PATH REPORT.COMMENTS IMP SPEC: NORMAL
PATH REPORT.FINAL DX SPEC: NORMAL
PATH REPORT.GROSS SPEC: NORMAL
PATH REPORT.MICROSCOPIC SPEC OTHER STN: NORMAL
PATH REPORT.RELEVANT HX SPEC: NORMAL
PHOTO IMAGE: NORMAL
PR INTERVAL - MUSE: 138 MS
QRS DURATION - MUSE: 110 MS
QT - MUSE: 338 MS
QTC - MUSE: 465 MS
R AXIS - MUSE: 138 DEGREES
SYSTOLIC BLOOD PRESSURE - MUSE: NORMAL MMHG
T AXIS - MUSE: 172 DEGREES
VENTRICULAR RATE- MUSE: 114 BPM

## 2022-05-19 PROCEDURE — 88350 IMFLUOR EA ADDL 1ANTB STN PX: CPT | Mod: 26 | Performed by: PATHOLOGY

## 2022-05-19 PROCEDURE — 88346 IMFLUOR 1ST 1ANTB STAIN PX: CPT | Mod: 26 | Performed by: PATHOLOGY

## 2022-05-19 PROCEDURE — 88307 TISSUE EXAM BY PATHOLOGIST: CPT | Mod: 26 | Performed by: PATHOLOGY

## 2022-05-19 NOTE — TELEPHONE ENCOUNTER
Pt called with yesterday's testing/lab results.   Everolimus level at goal- no dose change.  EBV and CMV negative. KIKA Benson reviewed RHC.  Pt to take Torsemide 20mg daily for 3 days and increase Potassium 20meq to BID for 3 days.  Resume PRN Torsemide dosing and once daily K+ dosing after 3 days.  Pt states understanding plan of care and instructions.

## 2022-05-20 ENCOUNTER — TELEPHONE (OUTPATIENT)
Dept: TRANSPLANT | Facility: CLINIC | Age: 62
End: 2022-05-20

## 2022-05-20 LAB
DONOR IDENTIFICATION: NORMAL
DSA COMMENTS: NORMAL
DSA PRESENT: NO
DSA TEST METHOD: NORMAL
IMMUKNOW IMMUNE CELL FUNCTION: 252 NG/ML
ORGAN: NORMAL
SA 1 CELL: NORMAL
SA 1 TEST METHOD: NORMAL
SA 2 CELL: NORMAL
SA 2 TEST METHOD: NORMAL
SA1 HI RISK ABY: NORMAL
SA1 MOD RISK ABY: NORMAL
SA2 HI RISK ABY: NORMAL
SA2 MOD RISK ABY: NORMAL
UNACCEPTABLE ANTIGENS: NORMAL
UNOS CPRA: 0
ZZZSA 1  COMMENTS: NORMAL
ZZZSA 2 COMMENTS: NORMAL

## 2022-05-20 NOTE — TELEPHONE ENCOUNTER
VM left with pt regarding the following results:  Angiogram is normal- no evidence of CAV or CAD.  Heart biopsy is negative.  Pt encouraged to call back with questions or concerns.

## 2022-05-22 LAB
ALLOMAP SCORE (EXTERNAL): 27 (ref 0–40)
NEGATIVE PREDICTIVE VALUE PERCENT (EXTERNAL): 98.7 %
POSITIVE PREDICTIVE VALUE PERCENT (EXTERNAL): 1.9 %

## 2022-07-12 ENCOUNTER — TELEPHONE (OUTPATIENT)
Dept: TRANSPLANT | Facility: CLINIC | Age: 62
End: 2022-07-12

## 2022-07-12 NOTE — TELEPHONE ENCOUNTER
Call returned to pt.  Pt updated regarding Dr. Ortiz's departure and assignment to new MD.  Dr. Stone will take over as pt's Transplant Cardiologist.  We will continue to fill pt's medications as needed until pt can be seen by Dr. Stone.  Pt is in agreement with plan.

## 2022-07-12 NOTE — TELEPHONE ENCOUNTER
General  Route to LPN    Reason for call: Red wanted to know who will be taking over for Jenni Ortiz    Call back needed? Yes  Return Call Needed  Same as documented in contacts section  When to return call?: Same day: Route High Priority

## 2022-09-06 NOTE — TELEPHONE ENCOUNTER
I spoke with Mariusz today and he said his  kits were not transferred with the move. I sent mailers to him. They will go out  Tomorrow.   The Service to Acupuncture order in work queue [id number] requested on 8/15/2022 has been cancelled as patient declined services. Ordering provider has been notified.Due to self pay.    Please contact patient if further communication is needed.

## 2022-09-18 ENCOUNTER — HEALTH MAINTENANCE LETTER (OUTPATIENT)
Age: 62
End: 2022-09-18

## 2022-10-10 DIAGNOSIS — Z94.1 STATUS POST HEART TRANSPLANTATION (H): ICD-10-CM

## 2022-10-10 RX ORDER — ROSUVASTATIN CALCIUM 10 MG/1
10 TABLET, COATED ORAL DAILY
Qty: 90 TABLET | Refills: 3 | Status: SHIPPED | OUTPATIENT
Start: 2022-10-10 | End: 2022-10-11

## 2022-10-11 DIAGNOSIS — Z94.1 STATUS POST HEART TRANSPLANTATION (H): Primary | ICD-10-CM

## 2022-10-11 RX ORDER — ROSUVASTATIN CALCIUM 10 MG/1
10 TABLET, COATED ORAL DAILY
Qty: 90 TABLET | Refills: 3 | Status: SHIPPED | OUTPATIENT
Start: 2022-10-11 | End: 2023-02-15

## 2022-10-20 DIAGNOSIS — Z94.1 STATUS POST HEART TRANSPLANTATION (H): ICD-10-CM

## 2022-10-20 RX ORDER — MYCOPHENOLATE MOFETIL 250 MG/1
1000 CAPSULE ORAL 2 TIMES DAILY
Qty: 240 CAPSULE | Refills: 11 | Status: SHIPPED | OUTPATIENT
Start: 2022-10-20 | End: 2023-05-18

## 2022-10-25 ENCOUNTER — TRANSFERRED RECORDS (OUTPATIENT)
Dept: MULTI SPECIALTY CLINIC | Facility: CLINIC | Age: 62
End: 2022-10-25

## 2022-11-06 DIAGNOSIS — Z94.1 STATUS POST HEART TRANSPLANTATION (H): ICD-10-CM

## 2022-11-07 RX ORDER — POTASSIUM CHLORIDE 1500 MG/1
20 TABLET, EXTENDED RELEASE ORAL DAILY
Qty: 90 TABLET | Refills: 3 | Status: SHIPPED | OUTPATIENT
Start: 2022-11-07 | End: 2024-06-07

## 2022-11-16 DIAGNOSIS — Z94.1 HEART REPLACED BY TRANSPLANT (H): ICD-10-CM

## 2022-11-17 ENCOUNTER — TELEPHONE (OUTPATIENT)
Dept: TRANSPLANT | Facility: CLINIC | Age: 62
End: 2022-11-17

## 2022-11-17 DIAGNOSIS — Z94.1 HEART REPLACED BY TRANSPLANT (H): ICD-10-CM

## 2022-11-17 RX ORDER — PANTOPRAZOLE SODIUM 40 MG/1
40 TABLET, DELAYED RELEASE ORAL
Qty: 90 TABLET | Refills: 3 | Status: SHIPPED | OUTPATIENT
Start: 2022-11-17 | End: 2022-11-17

## 2022-11-17 RX ORDER — PANTOPRAZOLE SODIUM 40 MG/1
40 TABLET, DELAYED RELEASE ORAL
Qty: 90 TABLET | Refills: 3 | Status: SHIPPED | OUTPATIENT
Start: 2022-11-17 | End: 2024-01-04

## 2022-11-17 RX ORDER — HYDRALAZINE HYDROCHLORIDE 50 MG/1
100 TABLET, FILM COATED ORAL 3 TIMES DAILY
Qty: 540 TABLET | Refills: 3 | Status: SHIPPED | OUTPATIENT
Start: 2022-11-17 | End: 2023-02-10

## 2022-11-17 NOTE — TELEPHONE ENCOUNTER
Patient Call: Medication Refill  Route to LPN  Instruct the patient to first contact their pharmacy. If they have called their pharmacy and require further assistance, route to LPN.    Pharmacy Name: [facility]  Pharmacy Location:[facility]  Name of Medication: Pantoprazole 40 Dose: 40 mg                                     Hydralazine   50 mg  When will the patient be out of this medication?: Greater than 3 days (Route standard priority)

## 2022-11-23 DIAGNOSIS — Z94.1 STATUS POST HEART TRANSPLANTATION (H): ICD-10-CM

## 2022-11-30 RX ORDER — BLOOD SUGAR DIAGNOSTIC
STRIP MISCELLANEOUS
Qty: 400 STRIP | Refills: 3 | Status: SHIPPED | OUTPATIENT
Start: 2022-11-30

## 2022-12-06 ENCOUNTER — TELEPHONE (OUTPATIENT)
Dept: CARDIOLOGY | Facility: CLINIC | Age: 62
End: 2022-12-06

## 2022-12-06 DIAGNOSIS — Z94.1 STATUS POST HEART TRANSPLANTATION (H): ICD-10-CM

## 2022-12-06 RX ORDER — EVEROLIMUS 0.5 MG/1
TABLET ORAL
Qty: 630 TABLET | Refills: 3
Start: 2022-12-06 | End: 2023-03-07

## 2022-12-07 ENCOUNTER — TELEPHONE (OUTPATIENT)
Dept: TRANSPLANT | Facility: CLINIC | Age: 62
End: 2022-12-07

## 2022-12-07 DIAGNOSIS — Z94.1 STATUS POST HEART TRANSPLANTATION (H): Primary | ICD-10-CM

## 2022-12-07 DIAGNOSIS — E11.9 DIABETES MELLITUS, TYPE 2 (H): ICD-10-CM

## 2022-12-07 DIAGNOSIS — Z13.220 LIPID SCREENING: ICD-10-CM

## 2022-12-08 ENCOUNTER — TELEPHONE (OUTPATIENT)
Dept: TRANSPLANT | Facility: CLINIC | Age: 62
End: 2022-12-08

## 2022-12-08 NOTE — TELEPHONE ENCOUNTER
Call returned to pt.  Pt tested positive for Covid today.  Pt states symptoms are mild- achy, low grade fever, and occasional cough.  Will plan to reschedule pt's appointment for next week until after 10 isolation.  Message sent to Dr. Stone regarding positive Covid test and symptoms.  Pt will call if symptoms worsen.

## 2022-12-08 NOTE — TELEPHONE ENCOUNTER
General  Route to LPN    Reason for call: Red reports he tested positive for COVID and unsure if he should still come to his appointment on 12/13. He wanted to also know if there would be any medicines prescribed to help. He reports he doesn't feel extreme symptoms.     Call back needed? Yes  Return Call Needed  Same as documented in contacts section  When to return call?: Same day: Route High Priority

## 2022-12-12 ENCOUNTER — TELEPHONE (OUTPATIENT)
Dept: TRANSPLANT | Facility: CLINIC | Age: 62
End: 2022-12-12

## 2022-12-12 NOTE — TELEPHONE ENCOUNTER
Writer called pt to check in on how he is feeling since testing positive for Covid last week.  Pt states he is feeling better- no fever.  Only complaint is a sore throat at this time.  Reviewed with Dr. Stone- plan to continue to monitor for now. Pt to call if his symptoms increase or change.  30 month appointment rescheduled for January.  Pt states understanding and is agreeable to plan of care.

## 2022-12-23 NOTE — LETTER
Transition Communication Hand-off for Care Transitions to Next Level of Care Provider    Name: Mariusz Sharp  : 1960  MRN #: [id number]  Primary Care Provider:  Dr. Patricia Irving  (new PCP)  Primary Clinic:  [facility] [address]     Reason for Hospitalization:  Cardiogenic Shock  Heart failure (H)  Admit Date/Time: 2018  7:25 PM  Discharge Date: 18  Payor Source: [facility] / Plan: HE[employer] EZ / Product Type: PPO   Readmission Assessment Measure (FRENCH) Risk Score/category: elevated.   Reason for Communication Hand-off Referral: Multiple providers/specialties   Discharge Plan:   Concern for non-adherence with plan of care: no  Discharge Needs Assessment:  Needs       Most Recent Value    Equipment Currently Used at Home none    Transportation Available car, family or friend will provide    Other Resources Other (see comment)    Other -- [[facility] (Ph: [phone]) for INR]      Follow-up specialty is recommended: Yes    Follow-up plan:  Future Appointments  Date Time Provider Department Center   2018 2:30 PM  LAB [facility]   2018 3:00 PM Pippa Rodriguez NP [facility]   Any outstanding tests or procedures:        Referrals     Future Labs/Procedures    Cardiac Rehab Referral     Comments:    *This therapy referral will be filtered to a centralized scheduling office at [facility] and the patient will receive a call to schedule an appointment at a [place] location most convenient for them.*     If you have not heard from the scheduling office within 2 business days, please call [phone] for all locations, with the exception of [place], please call [phone].    Please be aware that coverage of these services is subject to the terms and limitations of your health insurance plan.  Call member services at your health plan with any benefit or coverage questions.      **Note to Provider:  If you are  "referring outside of [place] for the therapy appointment, please list the name of the location in the \"special instructions\" above, print the referral and give to the patient to schedule the appointment.                   Key Recommendations:  Post hospitalization.   BRIAN PARKER RN CC      " no

## 2023-01-17 NOTE — TELEPHONE ENCOUNTER
Called [facility] to [facility] on status of enrollment. Was informed that provider portion was not received and that was all that was missing. Resent the provider portion again to [facility] for review

## 2023-01-28 ENCOUNTER — HEALTH MAINTENANCE LETTER (OUTPATIENT)
Age: 63
End: 2023-01-28

## 2023-02-01 NOTE — TELEPHONE ENCOUNTER
Free Drug Application Approved  Effective Dates: 1/18/2023-12/31/2023  Patient notified: no  Additional Information: n/a

## 2023-02-07 ENCOUNTER — TELEPHONE (OUTPATIENT)
Dept: TRANSPLANT | Facility: CLINIC | Age: 63
End: 2023-02-07
Payer: COMMERCIAL

## 2023-02-07 DIAGNOSIS — Z94.1 STATUS POST HEART TRANSPLANTATION (H): Primary | ICD-10-CM

## 2023-02-10 ENCOUNTER — TELEPHONE (OUTPATIENT)
Dept: TRANSPLANT | Facility: CLINIC | Age: 63
End: 2023-02-10
Payer: COMMERCIAL

## 2023-02-10 DIAGNOSIS — Z94.1 HEART REPLACED BY TRANSPLANT (H): ICD-10-CM

## 2023-02-10 RX ORDER — HYDRALAZINE HYDROCHLORIDE 50 MG/1
125 TABLET, FILM COATED ORAL 3 TIMES DAILY
Qty: 675 TABLET | Refills: 3 | Status: SHIPPED | OUTPATIENT
Start: 2023-02-10 | End: 2024-03-27

## 2023-02-10 NOTE — TELEPHONE ENCOUNTER
Pt called to report his BP has been greater than 140/90 for several days.  Reviewed with Dr. Stone.  Pt to increase Hydralazine dose to 125mg TID and we will review at pt's clinic appt next week.  Pt states understanding plan of care and instructions.

## 2023-02-15 ENCOUNTER — OFFICE VISIT (OUTPATIENT)
Dept: CARDIOLOGY | Facility: CLINIC | Age: 63
End: 2023-02-15
Attending: INTERNAL MEDICINE
Payer: COMMERCIAL

## 2023-02-15 ENCOUNTER — LAB (OUTPATIENT)
Dept: LAB | Facility: CLINIC | Age: 63
End: 2023-02-15
Payer: COMMERCIAL

## 2023-02-15 VITALS
WEIGHT: 188.9 LBS | HEIGHT: 65 IN | SYSTOLIC BLOOD PRESSURE: 129 MMHG | HEART RATE: 115 BPM | OXYGEN SATURATION: 97 % | DIASTOLIC BLOOD PRESSURE: 89 MMHG | BODY MASS INDEX: 31.47 KG/M2

## 2023-02-15 DIAGNOSIS — N18.32 STAGE 3B CHRONIC KIDNEY DISEASE (H): ICD-10-CM

## 2023-02-15 DIAGNOSIS — Z94.1 STATUS POST HEART TRANSPLANTATION (H): ICD-10-CM

## 2023-02-15 DIAGNOSIS — I10 BENIGN ESSENTIAL HYPERTENSION: Primary | ICD-10-CM

## 2023-02-15 DIAGNOSIS — Z13.220 LIPID SCREENING: ICD-10-CM

## 2023-02-15 DIAGNOSIS — E11.9 DIABETES MELLITUS, TYPE 2 (H): ICD-10-CM

## 2023-02-15 DIAGNOSIS — E78.2 MIXED HYPERLIPIDEMIA: ICD-10-CM

## 2023-02-15 LAB
ALBUMIN SERPL BCG-MCNC: 4.6 G/DL (ref 3.5–5.2)
ALP SERPL-CCNC: 71 U/L (ref 40–129)
ALT SERPL W P-5'-P-CCNC: 26 U/L (ref 10–50)
ANION GAP SERPL CALCULATED.3IONS-SCNC: 15 MMOL/L (ref 7–15)
AST SERPL W P-5'-P-CCNC: 28 U/L (ref 10–50)
BASOPHILS # BLD AUTO: 0.1 10E3/UL (ref 0–0.2)
BASOPHILS NFR BLD AUTO: 1 %
BILIRUB SERPL-MCNC: 0.7 MG/DL
BUN SERPL-MCNC: 34.1 MG/DL (ref 8–23)
CALCIUM SERPL-MCNC: 9.6 MG/DL (ref 8.8–10.2)
CHLORIDE SERPL-SCNC: 105 MMOL/L (ref 98–107)
CHOLEST SERPL-MCNC: 186 MG/DL
CK SERPL-CCNC: 152 U/L (ref 39–308)
CREAT SERPL-MCNC: 1.74 MG/DL (ref 0.67–1.17)
DEPRECATED HCO3 PLAS-SCNC: 21 MMOL/L (ref 22–29)
EOSINOPHIL # BLD AUTO: 0.1 10E3/UL (ref 0–0.7)
EOSINOPHIL NFR BLD AUTO: 2 %
ERYTHROCYTE [DISTWIDTH] IN BLOOD BY AUTOMATED COUNT: 14.4 % (ref 10–15)
GFR SERPL CREATININE-BSD FRML MDRD: 44 ML/MIN/1.73M2
GLUCOSE SERPL-MCNC: 121 MG/DL (ref 70–99)
HCT VFR BLD AUTO: 39.3 % (ref 40–53)
HDLC SERPL-MCNC: 39 MG/DL
HGB BLD-MCNC: 12.8 G/DL (ref 13.3–17.7)
IMM GRANULOCYTES # BLD: 0 10E3/UL
IMM GRANULOCYTES NFR BLD: 0 %
LDLC SERPL CALC-MCNC: 106 MG/DL
LVEF ECHO: NORMAL
LYMPHOCYTES # BLD AUTO: 0.8 10E3/UL (ref 0.8–5.3)
LYMPHOCYTES NFR BLD AUTO: 12 %
MAGNESIUM SERPL-MCNC: 2.2 MG/DL (ref 1.7–2.3)
MCH RBC QN AUTO: 26.3 PG (ref 26.5–33)
MCHC RBC AUTO-ENTMCNC: 32.6 G/DL (ref 31.5–36.5)
MCV RBC AUTO: 81 FL (ref 78–100)
MONOCYTES # BLD AUTO: 0.7 10E3/UL (ref 0–1.3)
MONOCYTES NFR BLD AUTO: 12 %
NEUTROPHILS # BLD AUTO: 4.7 10E3/UL (ref 1.6–8.3)
NEUTROPHILS NFR BLD AUTO: 73 %
NONHDLC SERPL-MCNC: 147 MG/DL
NRBC # BLD AUTO: 0 10E3/UL
NRBC BLD AUTO-RTO: 0 /100
PHOSPHATE SERPL-MCNC: 3.3 MG/DL (ref 2.5–4.5)
PLATELET # BLD AUTO: 247 10E3/UL (ref 150–450)
POTASSIUM SERPL-SCNC: 3.7 MMOL/L (ref 3.4–5.3)
PROT SERPL-MCNC: 7.8 G/DL (ref 6.4–8.3)
RBC # BLD AUTO: 4.86 10E6/UL (ref 4.4–5.9)
SODIUM SERPL-SCNC: 141 MMOL/L (ref 136–145)
TRIGL SERPL-MCNC: 206 MG/DL
WBC # BLD AUTO: 6.4 10E3/UL (ref 4–11)

## 2023-02-15 PROCEDURE — 82550 ASSAY OF CK (CPK): CPT | Performed by: PATHOLOGY

## 2023-02-15 PROCEDURE — 80061 LIPID PANEL: CPT | Performed by: PATHOLOGY

## 2023-02-15 PROCEDURE — 80053 COMPREHEN METABOLIC PANEL: CPT | Performed by: PATHOLOGY

## 2023-02-15 PROCEDURE — 83036 HEMOGLOBIN GLYCOSYLATED A1C: CPT | Performed by: INTERNAL MEDICINE

## 2023-02-15 PROCEDURE — G0463 HOSPITAL OUTPT CLINIC VISIT: HCPCS | Performed by: INTERNAL MEDICINE

## 2023-02-15 PROCEDURE — 93306 TTE W/DOPPLER COMPLETE: CPT | Performed by: INTERNAL MEDICINE

## 2023-02-15 PROCEDURE — 99214 OFFICE O/P EST MOD 30 MIN: CPT | Mod: 25 | Performed by: INTERNAL MEDICINE

## 2023-02-15 PROCEDURE — 36415 COLL VENOUS BLD VENIPUNCTURE: CPT | Performed by: PATHOLOGY

## 2023-02-15 PROCEDURE — 85025 COMPLETE CBC W/AUTO DIFF WBC: CPT | Performed by: PATHOLOGY

## 2023-02-15 PROCEDURE — 87799 DETECT AGENT NOS DNA QUANT: CPT | Performed by: INTERNAL MEDICINE

## 2023-02-15 PROCEDURE — 99207 PR STATISTIC IV PUSH SINGLE INITIAL SUBSTANCE: CPT | Performed by: INTERNAL MEDICINE

## 2023-02-15 PROCEDURE — 83735 ASSAY OF MAGNESIUM: CPT | Performed by: PATHOLOGY

## 2023-02-15 PROCEDURE — 84100 ASSAY OF PHOSPHORUS: CPT | Performed by: PATHOLOGY

## 2023-02-15 RX ORDER — ROSUVASTATIN CALCIUM 20 MG/1
20 TABLET, COATED ORAL DAILY
Qty: 90 TABLET | Refills: 3 | Status: SHIPPED | OUTPATIENT
Start: 2023-02-15 | End: 2023-12-07

## 2023-02-15 RX ADMIN — Medication 6 ML: at 15:31

## 2023-02-15 ASSESSMENT — PAIN SCALES - GENERAL: PAINLEVEL: NO PAIN (0)

## 2023-02-15 NOTE — NURSING NOTE
Chief Complaint   Patient presents with     Follow Up     Return Ht Tx     Vitals were taken and medications reconciled.    Sam Gauthier, EMT  3:44 PM

## 2023-02-15 NOTE — LETTER
"2/15/2023      RE: Mariusz Sharp  [address] [facility] [address]       Dear Colleague,    Thank you for the opportunity to participate in the care of your patient, Mariusz Sharp, at the [facility] [place] at [facility]. Please see a copy of my visit note below.    February 15, 2023      \"nAju Sharp is a 62yr old male with a history of CAD (STEMI with ALYSSA to LAD 6/27/18), ICM (LVEF 20-25%, s/p HM3 LVAD 12/31/18) s/p OHT 5/18/20, monomorphic VT (s/p ICD with shocks), LV thrombus, CKD, elevated PSA, pAF, HTN, HL, and DMII who presents to clinic for routine follow up.   His post-transplant course was c/b NSVT (with no hemodynamic compromise), leukocytosis with C Acnes growing from his former LVAD site (thought to be a contaminant, but treated with IV vanco --> po doxy through 6/1/20, 14d course total), and in the setting of donor blood growing S anginosus and Veillonella (also thought to be a contaminant, but treated with Vanco/zosyn --> Vanco/Flagyl for a total of 7 days).  He also had hyperkalemia, which improved following kayexalate and stopping bactrim.  He ultimately discharged 5/29/20.     He was readmitted 8/31-9/24/20 with fever, cough, diarrhea, and syncope.  He was found to have a post-obstructive vs aspiration pneumonia, RUL/suprahilar mass, and narrowing of multiple RUL bronchi.  He was initially treated as a fungal infection as his fungitelle was +, but he did not clinically improve, nor did the size of the mass change.  Ultimately, tissue culture from the RUL mass showed abiotrophia defectiva (oral microbe), which was thought to be a contaminant, but did respond to antibiotics.  His MMF was decreased to 500mg twice daily, as ImmuKnow was noted to be low.     Rejection history:  none  AlloMap scores:  < 35  DSAs:  none  Coronary angio/Ischemic eval: Coronary angiogram 6/2021 showed normal coronary arteries with no angiographic " evidence of obstruction.  Last RHC:  10/2021 showed normal biventricular filling pressures, with RA 4, mPA 20, PCW 14, and CI 2.7.  Echo/cMRI: TTE 2/2022 showed stable graft function, with LVEF 55-60% and normal RV size/function.     Overall he has been doing well without any new complaints.  Takes medications as prescribed.  He does have minimal to trace lower extremity edema after taking the amlodipine however it is better by the end of the evenings.  Denies any new issues.  Blood pressure has been a little bit higher and we have been been increasing his hydralazine to 125 mg 3 times a day.  He also did have some ocular hemorrhage recently did see ophthalmology might have been related to high blood pressure versus ongoing cough from the post-COVID syndrome.  No interventions made he needs follow-up in 3 months.  No ongoing cough is a little bit better since the COVID with minimal sputum production.     Serostatus:  CMV D+/R-  EBV D-/R+  Toxo D-/R-      PAST MEDICAL HISTORY:  Past Medical History:   Diagnosis Date     Acute kidney injury (H)      CKD (chronic kidney disease)      Coronary artery disease      Diabetes mellitus (H)      HTN (hypertension)      Hyperlipidemia      ICD (implantable cardioverter-defibrillator), single chamber- NOT dependent 7/17/2018     Ischemic cardiomyopathy      LV (left ventricular) mural thrombus      Paroxysmal atrial flutter (H)      RVF (right ventricular failure) (H)      Systolic heart failure (H)      Ventricular tachycardia (H)      FAMILY HISTORY:  No family history on file.  SOCIAL HISTORY:  Social History     Socioeconomic History     Marital status: Single   Tobacco Use     Smoking status: Never     Smokeless tobacco: Never   Substance and Sexual Activity     Alcohol use: No     Drug use: No   Other Topics Concern     Parent/sibling w/ CABG, MI or angioplasty before 65F 55M? No     CURRENT MEDICATIONS:  Current Outpatient Medications   Medication     VINEETU-CHEK [place]  "PLUS test strip     acetaminophen (TYLENOL) 325 MG tablet     amLODIPine (NORVASC) 10 MG tablet     aspirin (ASA) 81 MG EC tablet     BD SILVANA U/F 32G X 4 MM insulin pen needle     calcium carbonate 600 mg-vitamin D 400 units (CALTRATE) 600-400 MG-UNIT per tablet     dapagliflozin (FARXIGA) 10 MG TABS tablet     everolimus (ZORTRESS) 0.5 MG tablet     HUMALOG KWIKPEN 100 UNIT/ML soln     hydrALAZINE (APRESOLINE) 50 MG tablet     insulin glargine (LANTUS PEN) 100 UNIT/ML pen     loperamide (IMODIUM) 2 MG capsule     magnesium oxide (MAG-OX) 400 (241.3 Mg) MG tablet     multivitamin w/minerals (THERA-VIT-M) tablet     mycophenolate (GENERIC EQUIVALENT) 250 MG capsule     pantoprazole (PROTONIX) 40 MG EC tablet     potassium chloride ER (KLOR-CON M) 20 MEQ CR tablet     rosuvastatin (CRESTOR) 10 MG tablet     torsemide (DEMADEX) 20 MG tablet     No current facility-administered medications for this visit.     ROS:   Constitutional: No fever, chills, or sweats. Weight is 0 lbs 0 oz  ENT: No visual disturbance, ear ache, epistaxis, sore throat.   Allergies/Immunologic: Negative.   Respiratory: No cough, hemoptysis.   Cardiovascular: As per HPI.   GI: No nausea, vomiting, hematemesis, melena, or hematochezia.   : No urinary frequency, dysuria, or hematuria.   Integument: Negative.   Psychiatric: Pleasant, no major depression noted  Neuro: No focal neurological deficits noted  Endocrinology: Negative.   Musculoskeletal: As per HPI.      EXAM:  /89 (BP Location: Right arm, Patient Position: Chair, Cuff Size: Adult Regular)   Pulse 115   Ht 1.652 m (5' 5.04\")   Wt 85.7 kg (188 lb 14.4 oz)   SpO2 97%   BMI 31.40 kg/m    In general, the patient is a pleasant male in no apparent distress.    HEENT: NC/AT. PERRLA. EOMI.  Sclerae white, not injected.    Neck:  No adenopathy, No thyromegaly.    COR: JVD at clavicle when sitting upright.  RRR.  Normal S1 S2 splits physiologically.  No murmur, rub click, or gallop.  " "  Lungs:  CTA. No rhonchi.    Abdomen: soft, nontender, nondistended.  No organomegaly.  Extremities:  No clubbing, cyanosis, or LE edema.    Neuro: Alert & Oriented x 3, grossly non focal.  Integument: no open lesions, rashes, or jaundice.     Labs:  Lab Results   Component Value Date    WBC 6.8 05/18/2022    HGB 12.5 (L) 05/18/2022    HCT 41.3 05/18/2022     05/18/2022     05/18/2022    POTASSIUM 3.5 05/18/2022    CHLORIDE 103 05/18/2022    CO2 25 05/18/2022    BUN 41 (H) 05/18/2022    CR 1.61 (H) 05/18/2022     (H) 05/18/2022    DD 2.0 (H) 06/04/2020    NTBNPI 1,673 (H) 08/18/2020    NTBNP 404 (H) 06/26/2019    TROPI <0.015 09/17/2020    AST 34 05/18/2022    ALT 49 05/18/2022    ALKPHOS 77 05/18/2022    BILITOTAL 1.0 05/18/2022    INR 1.06 08/26/2020     RHC 10/26/21:  RA: --/--/6  RV: 27/6  PA: 25/18/22  PCWP: --/--/17  PA Sat: 62.3%  Hb: 11  TD CO/CI: 5/2.62  AMRIT CO/CI: 5.15/2.7  PVR: 1.0 (AMRIT)  SVR 1430     TTE 2/23/22:  Global and regional left ventricular function is normal with an EF of 55-60%.  Right ventricular function, chamber size, wall motion, and thickness are normal.  Pulmonary artery systolic pressure cannot be assessed. The inferior vena cava is normal.  No significant changes noted.     TTE 5/18/22:  Left ventricular function is decreased. The ejection fraction is 40-45% (mildly reduced).  Global right ventricular function is mildly reduced.  No significant valvular abnormalities.  No pericardial effusion is present.  This study was compared with the study from 2/23/22: While comparison with the prior study is challenging due to poor image quality on the previous images, LV and RV function appear to have decreased slightly.    Coronary angiogram/RHC 5/18/22:  NOrmal coroanries  RA 7  RV 31/8  PA 31/18/24  PCWP 17  Cardiac output by Amrit: 7.20 L/min (indexed to 3.6 L/min/m2)  PVR 1.0     Assessment and plan:  MrMil \"Red\" Aron is a 62yr old male with a history of CAD (STEMI " with ALYSSA to LAD 6/27/18), ICM (LVEF 20-25%, s/p HM3 LVAD 12/31/18) s/p OHT 5/18/20, monomorphic VT (s/p ICD with shocks), LV thrombus, CKD, elevated PSA, pAF, HTN, HL, and DMII who presents to clinic for routine follow up.    Transthoracic echocardiogram reviewed today, LVEF is probably reported around 40 to 45% however final read is pending.  On review of the contrast images I do believe his ejection fraction is a little bit better closer to like 5055%.  This is grossly unchanged from previous.  As such we will not make any adjustments at this time.  He did not have any other evaluation today.  Given that the edema is related to amlodipine use as such we will not increase medications at this time.  He appears stable otherwise without any issues.  He will not make any other medication changes, we will see him back protocol       ICM, s/p OHT 5/18/20  His post-transplant course was c/b NSVT (with no hemodynamic compromise), leukocytosis with C Acnes growing from his former LVAD site (thought to be a contaminant, but treated with IV vanco --> po doxy through 6/1/20, 14d course total), and in the setting of donor blood growing S anginosus and Veillonella (also thought to be a contaminant, but treated with Vanco/zosyn --> Vanco/Flagyl for a total of 7 days).  He also had hyperkalemia, which improved following kayexalate and stopping bactrim.      Rejection history:  none  AlloMap scores:  < 35  DSAs:  none  Coronary angio/Ischemic eval: Coronary angiogram 6/2021 showed normal coronary arteries with no angiographic evidence of obstruction.  Last RHC:  10/2021 showed normal biventricular filling pressures, with RA 4, mPA 20, PCW 14, and CI 2.7.  Echo/cMRI: TTE 2/2022 showed stable graft function, with LVEF 55-60% and normal RV size/function.     Serostatus:  - CMV D+/R-  - EBV D-/R+  - Toxo D-/R-     Immunosuppression:  - MMF 1000mg BID  - everolimus, goal level 6-8.  Level today pending.     PPx:  - CAV:  Continue  rosuvastatin 10mg daily.  - GI:  Pantoprazole 40mg daily  - Osteoporosis:  Calcium/vitamin d supplements     Graft function:  - BPs:  Controlled, continue amlodipine 5mg daily and hydralazine 100mg TID  - fluid status:  Euvolemic, will review torsemide dosing after RHC today -- currently taking prn, has taken 2 doses in the last 2 days     Health maintenance:  - derm exam overdue  - dental exam overdue  - eye exam UTD, has developing cataracts  - COVID shots 3/3, need booster  - needs Evashield   - needs shingrix, insurance only covers 25%  - pneumonia shots -- advised that he discuss with PCP  - last colo 12/2018, due now  - following with urology     Post-obstructive versus aspiration pneumonia  RUL/suprahilar mass   +abiotrophia defectiva  He was readmitted 8/31/20-9/24/20 with fever, cough, diarrhea, and syncope.  He was found to have a post-obstructive vs aspiration pneumonia, RUL/suprahilar mass, and narrowing of multiple RUL bronchi.  He was initially treated as a fungal infection as his fungitelle was +, but he did not clinically improve, nor did the size of the mass change.  Ultimately, tissue culture from the RUL mass showed abiotrophia defectiva (oral microbe), which was thought to be a contaminant, but did respond to antibiotics.  His MMF was decreased to 500mg twice daily, as ImmuKnow was noted to be low.  He completed 4 weeks of antibiotic therapy 10/11/20, and follow up chest CTs have shown improvement in the RUL infiltrate.     DMII  HgbA1c remains elevated, advised that he follow-up with endo.  He has started Farxiga.     Hypertriglyceridemia  Referring to Dr. Dailey vs Dr. Danna Mosley today.     Elevated PSA  Prostate MRI 5/2020 showed signs of BPH, he follows with urology.     Possible chronic prostatits  Treated with antibiotics for > 1 month between 8/2020-10/2020.    I appreciate the opportunity to participate in the care of Mariusz Sharp . Please do not hesitate to contact me with any further  questions.    Sincerely,   Gerardo Stone MD  [facility] Division of Cardiology

## 2023-02-15 NOTE — PATIENT INSTRUCTIONS
atient Instructions  1. Increase your Crestor to 20mg daily- Angelika sent a prescription to [facility].  2. Have your Everolimus level checked when you get home.  Angelika will send orders.  3. Return in May for your 3rd annual.    Next transplant clinic appointment: In May for 3rd annual visit.   Next lab draw:   Coordinator will call with all pending results.     Please call transplant coordinator with any questions:    Angelika Muller RN BSN   Post Heart Transplant Nurse Coordinator  [facility]  Questions: [phone]

## 2023-02-15 NOTE — NURSING NOTE
Transplant Coordinator Note    Reason for visit: 30 Month visit  Coordinator: Present   Caregiver:  none today    Health concerns addressed today:  1. Had a bleed in eye- seeing eye MD in [place].  2. BP is better on Hydralazine 125mg TID.  3. EF on 40-45%- no symptoms- Dr. Stone reviewed.      Immunosuppressants:  Evero 2/1.5 Goal 6-8  MMF 1000 BID    Routine screenings:    Derm:   Dental:  Colonoscopy:  Breast/Prostate:  Eye:   Flu/Pneumonia:     Labs:       Additional Notes:         Labs, meds, echo reviewed with patient  Medication record reviewed and reconciled  Questions and concerns addressed  Pt verbalized an understanding of plan of care.     Patient Instructions  1. Increase your Crestor to 20mg daily- Angelika sent a prescription to [facility].  2. Have your Everolimus level checked when you get home.  Angelika will send orders.  3. Return in May for your 3rd annual.    Next transplant clinic appointment: In May for 3rd annual visit.   Next lab draw:   Coordinator will call with all pending results.     Please call transplant coordinator with any questions:    Angelika Muller RN BSN   Post Heart Transplant Nurse Coordinator  [facility]  Questions: [phone]

## 2023-02-15 NOTE — PROGRESS NOTES
"February 15, 2023      \"Red\"Aron is a 62yr old male with a history of CAD (STEMI with ALYSSA to LAD 6/27/18), ICM (LVEF 20-25%, s/p HM3 LVAD 12/31/18) s/p OHT 5/18/20, monomorphic VT (s/p ICD with shocks), LV thrombus, CKD, elevated PSA, pAF, HTN, HL, and DMII who presents to clinic for routine follow up.   His post-transplant course was c/b NSVT (with no hemodynamic compromise), leukocytosis with C Acnes growing from his former LVAD site (thought to be a contaminant, but treated with IV vanco --> po doxy through 6/1/20, 14d course total), and in the setting of donor blood growing S anginosus and Veillonella (also thought to be a contaminant, but treated with Vanco/zosyn --> Vanco/Flagyl for a total of 7 days).  He also had hyperkalemia, which improved following kayexalate and stopping bactrim.  He ultimately discharged 5/29/20.     He was readmitted 8/31-9/24/20 with fever, cough, diarrhea, and syncope.  He was found to have a post-obstructive vs aspiration pneumonia, RUL/suprahilar mass, and narrowing of multiple RUL bronchi.  He was initially treated as a fungal infection as his fungitelle was +, but he did not clinically improve, nor did the size of the mass change.  Ultimately, tissue culture from the RUL mass showed abiotrophia defectiva (oral microbe), which was thought to be a contaminant, but did respond to antibiotics.  His MMF was decreased to 500mg twice daily, as ImmuKnow was noted to be low.     Rejection history:  none  AlloMap scores:  < 35  DSAs:  none  Coronary angio/Ischemic eval: Coronary angiogram 6/2021 showed normal coronary arteries with no angiographic evidence of obstruction.  Last RHC:  10/2021 showed normal biventricular filling pressures, with RA 4, mPA 20, PCW 14, and CI 2.7.  Echo/cMRI: TTE 2/2022 showed stable graft function, with LVEF 55-60% and normal RV size/function.     Overall he has been doing well without any new complaints.  Takes medications as prescribed.  He does have " minimal to trace lower extremity edema after taking the amlodipine however it is better by the end of the evenings.  Denies any new issues.  Blood pressure has been a little bit higher and we have been been increasing his hydralazine to 125 mg 3 times a day.  He also did have some ocular hemorrhage recently did see ophthalmology might have been related to high blood pressure versus ongoing cough from the post-COVID syndrome.  No interventions made he needs follow-up in 3 months.  No ongoing cough is a little bit better since the COVID with minimal sputum production.     Serostatus:  CMV D+/R-  EBV D-/R+  Toxo D-/R-      PAST MEDICAL HISTORY:  Past Medical History:   Diagnosis Date     Acute kidney injury (H)      CKD (chronic kidney disease)      Coronary artery disease      Diabetes mellitus (H)      HTN (hypertension)      Hyperlipidemia      ICD (implantable cardioverter-defibrillator), single chamber- NOT dependent 7/17/2018     Ischemic cardiomyopathy      LV (left ventricular) mural thrombus      Paroxysmal atrial flutter (H)      RVF (right ventricular failure) (H)      Systolic heart failure (H)      Ventricular tachycardia (H)      FAMILY HISTORY:  No family history on file.  SOCIAL HISTORY:  Social History     Socioeconomic History     Marital status: Single   Tobacco Use     Smoking status: Never     Smokeless tobacco: Never   Substance and Sexual Activity     Alcohol use: No     Drug use: No   Other Topics Concern     Parent/sibling w/ CABG, MI or angioplasty before 65F 55M? No     CURRENT MEDICATIONS:  Current Outpatient Medications   Medication     ACCU-CHEK [place] PLUS test strip     acetaminophen (TYLENOL) 325 MG tablet     amLODIPine (NORVASC) 10 MG tablet     aspirin (ASA) 81 MG EC tablet     BD SILVANA U/F 32G X 4 MM insulin pen needle     calcium carbonate 600 mg-vitamin D 400 units (CALTRATE) 600-400 MG-UNIT per tablet     dapagliflozin (FARXIGA) 10 MG TABS tablet     everolimus (ZORTRESS) 0.5 MG  "tablet     HUMALOG KWIKPEN 100 UNIT/ML soln     hydrALAZINE (APRESOLINE) 50 MG tablet     insulin glargine (LANTUS PEN) 100 UNIT/ML pen     loperamide (IMODIUM) 2 MG capsule     magnesium oxide (MAG-OX) 400 (241.3 Mg) MG tablet     multivitamin w/minerals (THERA-VIT-M) tablet     mycophenolate (GENERIC EQUIVALENT) 250 MG capsule     pantoprazole (PROTONIX) 40 MG EC tablet     potassium chloride ER (KLOR-CON M) 20 MEQ CR tablet     rosuvastatin (CRESTOR) 10 MG tablet     torsemide (DEMADEX) 20 MG tablet     No current facility-administered medications for this visit.     ROS:   Constitutional: No fever, chills, or sweats. Weight is 0 lbs 0 oz  ENT: No visual disturbance, ear ache, epistaxis, sore throat.   Allergies/Immunologic: Negative.   Respiratory: No cough, hemoptysis.   Cardiovascular: As per HPI.   GI: No nausea, vomiting, hematemesis, melena, or hematochezia.   : No urinary frequency, dysuria, or hematuria.   Integument: Negative.   Psychiatric: Pleasant, no major depression noted  Neuro: No focal neurological deficits noted  Endocrinology: Negative.   Musculoskeletal: As per HPI.      EXAM:  /89 (BP Location: Right arm, Patient Position: Chair, Cuff Size: Adult Regular)   Pulse 115   Ht 1.652 m (5' 5.04\")   Wt 85.7 kg (188 lb 14.4 oz)   SpO2 97%   BMI 31.40 kg/m    In general, the patient is a pleasant male in no apparent distress.    HEENT: NC/AT. PERRLA. EOMI.  Sclerae white, not injected.    Neck:  No adenopathy, No thyromegaly.    COR: JVD at clavicle when sitting upright.  RRR.  Normal S1 S2 splits physiologically.  No murmur, rub click, or gallop.    Lungs:  CTA. No rhonchi.    Abdomen: soft, nontender, nondistended.  No organomegaly.  Extremities:  No clubbing, cyanosis, or LE edema.    Neuro: Alert & Oriented x 3, grossly non focal.  Integument: no open lesions, rashes, or jaundice.     Labs:  Lab Results   Component Value Date    WBC 6.8 05/18/2022    HGB 12.5 (L) 05/18/2022    HCT " "41.3 05/18/2022     05/18/2022     05/18/2022    POTASSIUM 3.5 05/18/2022    CHLORIDE 103 05/18/2022    CO2 25 05/18/2022    BUN 41 (H) 05/18/2022    CR 1.61 (H) 05/18/2022     (H) 05/18/2022    DD 2.0 (H) 06/04/2020    NTBNPI 1,673 (H) 08/18/2020    NTBNP 404 (H) 06/26/2019    TROPI <0.015 09/17/2020    AST 34 05/18/2022    ALT 49 05/18/2022    ALKPHOS 77 05/18/2022    BILITOTAL 1.0 05/18/2022    INR 1.06 08/26/2020     RHC 10/26/21:  RA: --/--/6  RV: 27/6  PA: 25/18/22  PCWP: --/--/17  PA Sat: 62.3%  Hb: 11  TD CO/CI: 5/2.62  AMRIT CO/CI: 5.15/2.7  PVR: 1.0 (AMRIT)  SVR 1430     TTE 2/23/22:  Global and regional left ventricular function is normal with an EF of 55-60%.  Right ventricular function, chamber size, wall motion, and thickness are normal.  Pulmonary artery systolic pressure cannot be assessed. The inferior vena cava is normal.  No significant changes noted.     TTE 5/18/22:  Left ventricular function is decreased. The ejection fraction is 40-45% (mildly reduced).  Global right ventricular function is mildly reduced.  No significant valvular abnormalities.  No pericardial effusion is present.  This study was compared with the study from 2/23/22: While comparison with the prior study is challenging due to poor image quality on the previous images, LV and RV function appear to have decreased slightly.    Coronary angiogram/RHC 5/18/22:  NOrmal coroanries  RA 7  RV 31/8  PA 31/18/24  PCWP 17  Cardiac output by Amrit: 7.20 L/min (indexed to 3.6 L/min/m2)  PVR 1.0     Assessment and plan:  Mr. \"Red\" Aron is a 62yr old male with a history of CAD (STEMI with ALYSSA to LAD 6/27/18), ICM (LVEF 20-25%, s/p HM3 LVAD 12/31/18) s/p OHT 5/18/20, monomorphic VT (s/p ICD with shocks), LV thrombus, CKD, elevated PSA, pAF, HTN, HL, and DMII who presents to clinic for routine follow up.    Transthoracic echocardiogram reviewed today, LVEF is probably reported around 40 to 45% however final read is pending.  " On review of the contrast images I do believe his ejection fraction is a little bit better closer to like 5055%.  This is grossly unchanged from previous.  As such we will not make any adjustments at this time.  He did not have any other evaluation today.  Given that the edema is related to amlodipine use as such we will not increase medications at this time.  He appears stable otherwise without any issues.  He will not make any other medication changes, we will see him back protocol       ICM, s/p OHT 5/18/20  His post-transplant course was c/b NSVT (with no hemodynamic compromise), leukocytosis with C Acnes growing from his former LVAD site (thought to be a contaminant, but treated with IV vanco --> po doxy through 6/1/20, 14d course total), and in the setting of donor blood growing S anginosus and Veillonella (also thought to be a contaminant, but treated with Vanco/zosyn --> Vanco/Flagyl for a total of 7 days).  He also had hyperkalemia, which improved following kayexalate and stopping bactrim.      Rejection history:  none  AlloMap scores:  < 35  DSAs:  none  Coronary angio/Ischemic eval: Coronary angiogram 6/2021 showed normal coronary arteries with no angiographic evidence of obstruction.  Last RHC:  10/2021 showed normal biventricular filling pressures, with RA 4, mPA 20, PCW 14, and CI 2.7.  Echo/cMRI: TTE 2/2022 showed stable graft function, with LVEF 55-60% and normal RV size/function.     Serostatus:  - CMV D+/R-  - EBV D-/R+  - Toxo D-/R-     Immunosuppression:  - MMF 1000mg BID  - everolimus, goal level 6-8.  Level today pending.     PPx:  - CAV:  Continue rosuvastatin 10mg daily.  - GI:  Pantoprazole 40mg daily  - Osteoporosis:  Calcium/vitamin d supplements     Graft function:  - BPs:  Controlled, continue amlodipine 5mg daily and hydralazine 100mg TID  - fluid status:  Euvolemic, will review torsemide dosing after RHC today -- currently taking prn, has taken 2 doses in the last 2 days     Health  maintenance:  - derm exam overdue  - dental exam overdue  - eye exam UTD, has developing cataracts  - COVID shots 3/3, need booster  - needs Evashield   - needs shingrix, insurance only covers 25%  - pneumonia shots -- advised that he discuss with PCP  - last colo 12/2018, due now  - following with urology     Post-obstructive versus aspiration pneumonia  RUL/suprahilar mass   +abiotrophia defectiva  He was readmitted 8/31/20-9/24/20 with fever, cough, diarrhea, and syncope.  He was found to have a post-obstructive vs aspiration pneumonia, RUL/suprahilar mass, and narrowing of multiple RUL bronchi.  He was initially treated as a fungal infection as his fungitelle was +, but he did not clinically improve, nor did the size of the mass change.  Ultimately, tissue culture from the RUL mass showed abiotrophia defectiva (oral microbe), which was thought to be a contaminant, but did respond to antibiotics.  His MMF was decreased to 500mg twice daily, as ImmuKnow was noted to be low.  He completed 4 weeks of antibiotic therapy 10/11/20, and follow up chest CTs have shown improvement in the RUL infiltrate.     DMII  HgbA1c remains elevated, advised that he follow-up with endo.  He has started Farxiga.     Hypertriglyceridemia  Referring to Dr. Dailey vs Dr. Danna Mosley today.     Elevated PSA  Prostate MRI 5/2020 showed signs of BPH, he follows with urology.     Possible chronic prostatits  Treated with antibiotics for > 1 month between 8/2020-10/2020.    I appreciate the opportunity to participate in the care of Mariusz EMILIE Aron . Please do not hesitate to contact me with any further questions.    Sincerely,   Gerardo Stone MD  [facility] Division of Cardiology

## 2023-02-16 LAB — CMV DNA SPEC NAA+PROBE-ACNC: NOT DETECTED IU/ML

## 2023-02-17 ENCOUNTER — TELEPHONE (OUTPATIENT)
Dept: TRANSPLANT | Facility: CLINIC | Age: 63
End: 2023-02-17
Payer: COMMERCIAL

## 2023-02-17 DIAGNOSIS — Z94.1 HEART REPLACED BY TRANSPLANT (H): Primary | ICD-10-CM

## 2023-02-17 LAB
EBV DNA COPIES/ML, INSTRUMENT: 4121 COPIES/ML
EBV DNA SPEC NAA+PROBE-LOG#: 3.6 {LOG_COPIES}/ML

## 2023-02-17 NOTE — TELEPHONE ENCOUNTER
Pt called with remaining lab/testing results from this week's visit.  Echo with EF 50-55%.  CMV negative.  Pt states understanding.

## 2023-02-21 LAB
ALLOMAP SCORE (EXTERNAL): 25 (ref 0–40)
NEGATIVE PREDICTIVE VALUE PERCENT (EXTERNAL): 100 %
POSITIVE PREDICTIVE VALUE PERCENT (EXTERNAL): 2.2 %

## 2023-02-28 ENCOUNTER — LAB (OUTPATIENT)
Dept: LAB | Facility: CLINIC | Age: 63
End: 2023-02-28
Payer: COMMERCIAL

## 2023-02-28 DIAGNOSIS — Z94.1 STATUS POST HEART TRANSPLANTATION (H): ICD-10-CM

## 2023-02-28 PROCEDURE — 80169 DRUG ASSAY EVEROLIMUS: CPT

## 2023-03-03 DIAGNOSIS — Z79.4 TYPE 2 DIABETES MELLITUS WITH HYPERGLYCEMIA, WITH LONG-TERM CURRENT USE OF INSULIN (H): ICD-10-CM

## 2023-03-03 DIAGNOSIS — E11.65 TYPE 2 DIABETES MELLITUS WITH HYPERGLYCEMIA, WITH LONG-TERM CURRENT USE OF INSULIN (H): ICD-10-CM

## 2023-03-06 LAB
EVEROLIMUS BLD-MCNC: 9.6 UG/L (ref 3–8)
HBA1C MFR BLD: NORMAL %
TME LAST DOSE: ABNORMAL H
TME LAST DOSE: ABNORMAL H

## 2023-03-07 ENCOUNTER — TELEPHONE (OUTPATIENT)
Dept: ENDOCRINOLOGY | Facility: CLINIC | Age: 63
End: 2023-03-07
Payer: COMMERCIAL

## 2023-03-07 ENCOUNTER — TELEPHONE (OUTPATIENT)
Dept: TRANSPLANT | Facility: CLINIC | Age: 63
End: 2023-03-07
Payer: COMMERCIAL

## 2023-03-07 DIAGNOSIS — Z94.1 STATUS POST HEART TRANSPLANTATION (H): ICD-10-CM

## 2023-03-07 RX ORDER — DAPAGLIFLOZIN 10 MG/1
10 TABLET, FILM COATED ORAL DAILY
Qty: 90 TABLET | Refills: 0 | Status: SHIPPED | OUTPATIENT
Start: 2023-03-07 | End: 2023-03-09

## 2023-03-07 RX ORDER — EVEROLIMUS 0.5 MG/1
1.5 TABLET ORAL 2 TIMES DAILY
Qty: 540 TABLET | Refills: 3 | Status: SHIPPED | OUTPATIENT
Start: 2023-03-07 | End: 2023-05-12

## 2023-03-07 NOTE — TELEPHONE ENCOUNTER
FARXIGA 10MG TABLETS      Last Written Prescription Date:  3/4/22  Last Fill Quantity: 90,   # refills: 3  Last Office Visit : 3/4/22  Future Office visit:  none    Routing refill request to provider for review/approval because:  Overdue for 6mo office visit  Overdue for A1c  Abnormal creatinine

## 2023-03-07 NOTE — PROGRESS NOTES
Outcome for 03/07/23 2:25 PM: Data obtained via phone and located below  Dorothy Villeda MA    3/7/23  2:18pm: 12  6:45am: 198  12:00am: 147    3/6/23  9:00pm: 103  12:30pm: 152    3/5/23  9:30pm: 144  2:40pm: 95  12:00am: 210    3/4/23  3:30pm: 177  8:00am: 112    3/3/23  11:50pm: 115  5:00pm: 239  5:00am: 254    3/2/23  8:00pm: 185  12:00pm: 181    3/1/23  10:00pm: 242  3:00pm: 185  1:00pm: 165  9:00am: 128  7:00am: 196  12:00am: 128    2/28/23  8:00pm: 225  7:00pm: 329    2/27/23  3:00pm: 84  10:45am: 255  2:00am: 92    2/26/23  7:00pm: 140  8:00am: 163  12:00am: 164    2/25/23  9:20pm: 204  7:30pm: 159  4:43pm: 175  9:00am: 192  12:00am: 223    2/24/23  6:00pm: 168  3:00pm: 180  10:00am: 143  12:00am: 258    2/23/23  3:00pm: 185  11:00am: 140  9:00am: 75  12:00am: 248    2/22/23  5:30pm: 161  10:00am: 183    2/21/23  9:00pm: 211  5:00pm: 193  7:00am: 181        Patient is showing 4/5 MNCM met. A1c not in range   Dorothy Villeda MA  Answers for HPI/ROS submitted by the patient on 3/9/2023  General Symptoms: No  Skin Symptoms: No  HENT Symptoms: No  EYE SYMPTOMS: No  HEART SYMPTOMS: No  LUNG SYMPTOMS: No  INTESTINAL SYMPTOMS: No  URINARY SYMPTOMS: No  REPRODUCTIVE SYMPTOMS: No  SKELETAL SYMPTOMS: No  BLOOD SYMPTOMS: No  NERVOUS SYSTEM SYMPTOMS: No  MENTAL HEALTH SYMPTOMS: No

## 2023-03-07 NOTE — TELEPHONE ENCOUNTER
3/7/23  2:18pm: 12  6:45am: 198  12:00am: 147    3/6/23  9:00pm: 103  12:30pm: 152    3/5/23  9:30pm: 144  2:40pm: 95  12:00am: 210    3/4/23  3:30pm: 177  8:00am: 112    3/3/23  11:50pm: 115  5:00pm: 239  5:00am: 254    3/2/23  8:00pm: 185  12:00pm: 181    3/1/23  10:00pm: 242  3:00pm: 185  1:00pm: 165  9:00am: 128  7:00am: 196  12:00am: 128    2/28/23  8:00pm: 225  7:00pm: 329    2/27/23  3:00pm: 84  10:45am: 255  2:00am: 92    2/26/23  7:00pm: 140  8:00am: 163  12:00am: 164    2/25/23  9:20pm: 204  7:30pm: 159  4:43pm: 175  9:00am: 192  12:00am: 223    2/24/23  6:00pm: 168  3:00pm: 180  10:00am: 143  12:00am: 258    2/23/23  3:00pm: 185  11:00am: 140  9:00am: 75  12:00am: 248    2/22/23  5:30pm: 161  10:00am: 183    2/21/23  9:00pm: 211  5:00pm: 193  7:00am: 181

## 2023-03-09 ENCOUNTER — VIRTUAL VISIT (OUTPATIENT)
Dept: ENDOCRINOLOGY | Facility: CLINIC | Age: 63
End: 2023-03-09
Payer: COMMERCIAL

## 2023-03-09 DIAGNOSIS — Z94.1 STATUS POST HEART TRANSPLANTATION (H): ICD-10-CM

## 2023-03-09 DIAGNOSIS — E11.65 TYPE 2 DIABETES MELLITUS WITH HYPERGLYCEMIA, WITH LONG-TERM CURRENT USE OF INSULIN (H): ICD-10-CM

## 2023-03-09 DIAGNOSIS — Z79.4 TYPE 2 DIABETES MELLITUS WITH HYPERGLYCEMIA, WITH LONG-TERM CURRENT USE OF INSULIN (H): ICD-10-CM

## 2023-03-09 PROCEDURE — 99214 OFFICE O/P EST MOD 30 MIN: CPT | Mod: VID | Performed by: PHYSICIAN ASSISTANT

## 2023-03-09 RX ORDER — DAPAGLIFLOZIN 10 MG/1
10 TABLET, FILM COATED ORAL DAILY
Qty: 90 TABLET | Refills: 3 | Status: SHIPPED | OUTPATIENT
Start: 2023-03-09

## 2023-03-09 RX ORDER — INSULIN LISPRO 100 [IU]/ML
INJECTION, SOLUTION INTRAVENOUS; SUBCUTANEOUS
Qty: 75 ML | Refills: 3 | Status: SHIPPED | OUTPATIENT
Start: 2023-03-09 | End: 2023-08-11

## 2023-03-09 NOTE — LETTER
3/9/2023       RE: Mariusz Sharp  [address] [facility] ([facility]] [id number]     Dear Colleague,    Thank you for referring your patient, Mariusz Sharp, to the [facility] ENDOCR[facility]OLOG[facility] [place] at [facility]. Please see a copy of my visit note below.    Outcome for 03/07/23 2:25 PM: Data obtained via phone and located below  Dorothy Villeda MA    3/7/23  2:18pm: 12  6:45am: 198  12:00am: 147    3/6/23  9:00pm: 103  12:30pm: 152    3/5/23  9:30pm: 144  2:40pm: 95  12:00am: 210    3/4/23  3:30pm: 177  8:00am: 112    3/3/23  11:50pm: 115  5:00pm: 239  5:00am: 254    3/2/23  8:00pm: 185  12:00pm: 181    3/1/23  10:00pm: 242  3:00pm: 185  1:00pm: 165  9:00am: 128  7:00am: 196  12:00am: 128    2/28/23  8:00pm: 225  7:00pm: 329    2/27/23  3:00pm: 84  10:45am: 255  2:00am: 92    2/26/23  7:00pm: 140  8:00am: 163  12:00am: 164    2/25/23  9:20pm: 204  7:30pm: 159  4:43pm: 175  9:00am: 192  12:00am: 223    2/24/23  6:00pm: 168  3:00pm: 180  10:00am: 143  12:00am: 258    2/23/23  3:00pm: 185  11:00am: 140  9:00am: 75  12:00am: 248    2/22/23  5:30pm: 161  10:00am: 183    2/21/23  9:00pm: 211  5:00pm: 193  7:00am: 181        Patient is showing 4/5 MNCM met. A1c not in range   Dorothy Villeda MA  Answers for HPI/ROS submitted by the patient on 3/9/2023  General Symptoms: No  Skin Symptoms: No  HENT Symptoms: No  EYE SYMPTOMS: No  HEART SYMPTOMS: No  LUNG SYMPTOMS: No  INTESTINAL SYMPTOMS: No  URINARY SYMPTOMS: No  REPRODUCTIVE SYMPTOMS: No  SKELETAL SYMPTOMS: No  BLOOD SYMPTOMS: No  NERVOUS SYSTEM SYMPTOMS: No  MENTAL HEALTH SYMPTOMS: No        Due to the COVID 19 pandemic this visit was converted to a video visit in order to help prevent spread of infection in this patient and the general population.    Time of start: 11:32 am  Time of end: 11:48 am  Total duration of video visit: 16 minutes.  Providers location: offsite.  Patients location: [place].    CHANELL Vee (  Red Sharp is a 62 year old male with type 2 diabetes mellitus. Video visit today for diabetes care.  Pt was admitted 5/17/2020-5/29/2020 and underwent a heart transplant on 5/18/2020.  Pt's hx is significant for type 2 diabetes mellitus dx 5 + years ago, HTN, hyperlipidemia, hx of ischemic heart disease s/p STEMI with ALYSSA 2019, LVAD placement, Stage 3 CKD, and obesity.  Red was also admitted 5/12-5/15/2020 for ANDREW due to nephrolithiasis complicated by hydronephrosis/ANDREW.  For his diabetes, pt is prescribed to take  Lantus 36 units subcutaneous each pm and  Humalog 1 unit/5 gms CHO with meals with correction scale  ( 1 unit/25 for BG > 150 ) and bedtime correction scale ( 1 unit/25 for BG > 200).   He admits to missing some insulin doses.  He is also taking Farxiga 10 mg each am.  Most recent A1C was 8.3 % on 2/15/2023 and previous A1C was 8.6 % on 2/23/2022.   Pt's A1C A1C was 10.3 % on 6/23/2021.  His insurance will no longer cover his DexcomG6 sensor supplies, so he is not using his Dexcom sensor.  Apparently his insurance will not cover a Freestyle Osiris sensor.  I do not have his glucose meter download data today.  Pt provided me with blood sugar reading which I reviewed and scanned in his note below.  As above, he has missed some Humalog doses.  On ROS today, mild tingling in both feet. Denies foot ulcers/sores.  Cough when he is out in the cold air. No fever. Worse since COVID in 12/2022.  Pt denies blurred vision, n/v, SOB at rest, rigors, abd pain, diarrhea, dysuria or hematuria.    Diabetes Care  Retinopathy:none; pt seen by Oph in Jan 2023 with no retinopathy per patient.  Nephropathy:yes- hx of CKD/ANDREW.  Neuropathy: mild tingling in both feet.  Foot Exam:no exam today.  Taking aspirin: yes.  Lipids:  in Oct 2021. Pt is taking Crestor.  CAD: yes; s/p heart transplant on 5/18/2020.  Depression: no.  Insulin: Basal and meal time insulin with correction scale ( 1 unit/25 for BG > 150 with meals  and > 200 at bedtime).    DM meds: Farxiga.  Consider adding Ozempic today- will check to see if his insurance will cover a GLP-1 drug.  Testing: glucose meter.  Pt has a DexcomG6 sensor - not using sensor since his insurance will not cover his Dexcom supplies. Insurance will not cover Freestyle Osiris sensor.      ROS  See under HPI.    Allergies  No Known Allergies    Medications  Current Outpatient Medications   Medication Sig Dispense Refill     ACCU-CHEK [place]OTTE PLUS test strip Check BG 3 times daily at meals and at bedtime. 400 strip 3     acetaminophen (TYLENOL) 325 MG tablet Take 2 tablets (650 mg) by mouth every 4 hours as needed for other (multimodal surgical pain management along with NSAIDS and opioid medication as indicated based on pain control and physical function.)       amLODIPine (NORVASC) 10 MG tablet Take 1 tablet (10 mg) by mouth daily 90 tablet 3     aspirin (ASA) 81 MG EC tablet Take 1 tablet (81 mg) by mouth daily 30 tablet 11     BD SILVANA U/F 32G X 4 MM insulin pen needle Use 6 times daily. 600 each 3     calcium carbonate 600 mg-vitamin D 400 units (CALTRATE) 600-400 MG-UNIT per tablet Take 1 tablet by mouth 2 times daily (with meals)       dapagliflozin (FARXIGA) 10 MG TABS tablet Take 1 tablet (10 mg) by mouth daily 90 tablet 3     everolimus (ZORTRESS) 0.5 MG tablet Take 3 tablets (1.5 mg) by mouth 2 times daily 540 tablet 3     HUMALOG KWIKPEN 100 UNIT/ML soln INJECT ONE UNIT PER EVERY 5 GRAMS OF CARBS WITH MEALS AND SNACKS, PLUS CORRECTION, APPROX 70 UNITS IN 24 HOURS 75 mL 3     hydrALAZINE (APRESOLINE) 50 MG tablet Take 2.5 tablets (125 mg) by mouth 3 times daily 675 tablet 3     insulin glargine (LANTUS PEN) 100 UNIT/ML pen Inject 36 units subcutaneous at hs. 15 mL 3     loperamide (IMODIUM) 2 MG capsule Take 1 capsule (2 mg) by mouth 4 times daily as needed for diarrhea 60 capsule 0     magnesium oxide (MAG-OX) 400 (241.3 Mg) MG tablet Take 1 tablet (400 mg) by mouth daily 90 tablet 3      multivitamin w/minerals (THERA-VIT-M) tablet Take 1 tablet by mouth daily 90 tablet 3     mycophenolate (GENERIC EQUIVALENT) 250 MG capsule Take 4 capsules (1,000 mg) by mouth 2 times daily 240 capsule 11     pantoprazole (PROTONIX) 40 MG EC tablet Take 1 tablet (40 mg) by mouth every morning (before breakfast) 90 tablet 3     potassium chloride ER (KLOR-CON M) 20 MEQ CR tablet Take 1 tablet (20 mEq) by mouth daily 90 tablet 3     rosuvastatin (CRESTOR) 20 MG tablet Take 1 tablet (20 mg) by mouth daily 90 tablet 3     torsemide (DEMADEX) 20 MG tablet Take 1 tablet (20 mg) by mouth daily 90 tablet 3       Family History  Mother and father with hx of type 2 DM.  Social History   reports that he has never smoked. He has never used smokeless tobacco. He reports that he does not drink alcohol and does not use drugs.     Past Medical History  Past Medical History:   Diagnosis Date     Acute kidney injury (H)      CKD (chronic kidney disease)      Coronary artery disease      Diabetes mellitus (H)      HTN (hypertension)      Hyperlipidemia      ICD (implantable cardioverter-defibrillator), single chamber- NOT dependent 7/17/2018     Ischemic cardiomyopathy      LV (left ventricular) mural thrombus      Paroxysmal atrial flutter (H)      RVF (right ventricular failure) (H)      Systolic heart failure (H)      Ventricular tachycardia        Past Surgical History:   Procedure Laterality Date     BRONCHOSCOPY (RIGID OR FLEXIBLE), DIAGNOSTIC N/A 9/15/2020    Procedure: BRONCHOSCOPY, WITH BRONCHOALVEOLAR LAVAGE;  Surgeon: Elder Mejia MD;  Location:  GI     BRONCHOSCOPY (RIGID OR FLEXIBLE), DIAGNOSTIC N/A 9/17/2020    Procedure: BRONCHOSCOPY, WITH BRONCHOALVEOLAR LAVAGE;  Surgeon: Bill Lemus MD;  [facility]     BRONCHOSCOPY RIGID OR FLEXIBLE W/TRANSENDOSCOPIC ENDOBRONCHIAL ULTRASOUND GUIDED Right 9/8/2020    Procedure: BRONCHOSCOPY, WITH ENDOBRONCHIAL ULTRASOUND;  Surgeon: Donny Mrecedes MD;   Location: [facility] GI     COLONOSCOPY N/A 12/7/2018    Procedure: COLONOSCOPY;  Surgeon: Krish Mercado MD;  [facility]: [facility]     CV CORONARY ANGIOGRAM N/A 6/23/2021    Procedure: CV CORONARY ANGIOGRAM;  Surgeon: Brian Long MD;  [facility] HEART [facility]ARDIAC [facility] LAB     CV CORONARY ANGIOGRAM N/A 5/18/2022    Procedure: Coronary Angiogram [[id number]];  Surgeon: Khris Mcclelland MD;  [facility]     CV HEART BIOPSY N/A 5/24/2020    Procedure: Heart Biopsy;  Surgeon: Kit Bacon MD;  [facility]     CV HEART BIOPSY N/A 6/1/2020    Procedure: CV HEART BIOPSY;  Surgeon: Kit Bacon MD;  [facility]     CV HEART BIOPSY N/A 6/8/2020    Procedure: CV HEART BIOPSY;  Surgeon: Kit Bacon MD;  Location: [facility] C[facility]     CV HEART BIOPSY N/A 6/15/2020    Procedure: CV HEART BIOPSY;  Surgeon: Kit Bacon MD;  Location: [facility]ARD[facility] CATH LAB     CV HEART BIOPSY N/A 6/30/2020    Procedure: CV HEART BIOPSY;  Surgeon: Kit Bacon MD;  Location[facility] C[facility]     CV HEART BIOPSY N/A 7/14/2020    Procedure: CV HEART BIOPSY;  Surgeon: Brian Long MD;  Location: [facility] C[facility] LAB     CV HEART BIOPSY N/A 7/29/2020    Procedure: CV HEART BIOPSY;  Surgeon: Momo Hunt MD;  Location:  HEART CARDIAC CATH LAB     CV HEART BIOPSY N/A 8/13/2020    Procedure: CV HEART BIOPSY;  Surgeon: Khris Mcclelland MD;  Location:  HEART CARDIAC CATH LAB     CV HEART BIOPSY N/A 8/26/2020    Procedure: CV HEART BIOPSY;  Surgeon: Momo Hunt MD;  [facility]     CV HEART BIOPSY N/A 9/30/2020    Procedure: CV HEART BIOPSY;  Surgeon: Artemio Ulloa MD;  Location:  HEART CARDIAC CATH LAB     CV HEART BIOPSY N/A 10/29/2020    Procedure: CV HEART BIOPSY;  Surgeon: Kit Bacon,  MD;  Location:  HEART CARDIAC CATH LAB     CV HEART BIOPSY N/A 1/5/2021    Procedure: CV HEART BIOPSY;  Surgeon: Carlin Garcia MD;  [facility]     CV HEART BIOPSY N/A 6/23/2021    Procedure: CV HEART BIOPSY;  Surgeon: Brian Long MD;  Location:  HEART CARDIAC CATH LAB     CV HEART BIOPSY N/A 10/26/2021    Procedure: CV HEART BIOPSY;  Surgeon: Kit Bacon MD;  Location:  HEART CARDIAC CATH LAB     CV HEART BIOPSY N/A 5/18/2022    Procedure: Heart Biopsy;  Surgeon: Khris Mcclelland MD;  Location:  HEART CARDIAC CATH LAB     CV RIGHT HEART CATH MEASUREMENTS RECORDED N/A 2/19/2019    Procedure: RHC;  Surgeon: Brian Long MD;  Location:  HEART CARDIAC CATH LAB     CV RIGHT HEART CATH MEASUREMENTS RECORDED N/A 7/5/2019    Procedure: CV RIGHT HEART CATH;  Surgeon: Khris Mcclelland MD;  Location:  HEART CARDIAC CATH LAB     CV RIGHT HEART CATH MEASUREMENTS RECORDED N/A 5/24/2020    Procedure: Right Heart Cath;  Surgeon: Kit Bacon MD;  Location:  HEART CARDIAC CATH LAB     CV RIGHT HEART CATH MEASUREMENTS RECORDED N/A 6/1/2020    Procedure: CV RIGHT HEART CATH;  Surgeon: Kit Bacon MD;  Location:  HEART CARDIAC CATH LAB     CV RIGHT HEART CATH MEASUREMENTS RECORDED N/A 6/8/2020    Procedure: CV RIGHT HEART CATH;  Surgeon: Kit Bacon MD;  Location:  HEART CARDIAC CATH LAB     CV RIGHT HEART CATH MEASUREMENTS RECORDED N/A 6/15/2020    Procedure: CV RIGHT HEART CATH;  Surgeon: Kit Bacon MD;  Location:  HEART CARDIAC CATH LAB     CV RIGHT HEART CATH MEASUREMENTS RECORDED N/A 6/30/2020    Procedure: CV RIGHT HEART CATH;  Surgeon: Kit Bacon MD;  Location:  HEART CARDIAC CATH LAB     CV RIGHT HEART CATH MEASUREMENTS RECORDED N/A 7/14/2020    Procedure: CV RIGHT HEART CATH;  Surgeon: Brian Long MD;  Location:  HEART CARDIAC  CATH LAB     CV RIGHT HEART CATH MEASUREMENTS RECORDED N/A 7/29/2020    Procedure: CV RIGHT HEART CATH;  Surgeon: Momo Hunt MD;  Location:  HEART CARDIAC CATH LAB     CV RIGHT HEART CATH MEASUREMENTS RECORDED N/A 8/13/2020    Procedure: CV RIGHT HEART CATH;  Surgeon: Khris Mcclelland MD;  Location:  HEART CARDIAC CATH LAB     CV RIGHT HEART CATH MEASUREMENTS RECORDED N/A 8/26/2020    Procedure: CV RIGHT HEART CATH;  Surgeon: Momo Hunt MD;  Location:  HEART CARDIAC CATH LAB     CV RIGHT HEART CATH MEASUREMENTS RECORDED N/A 9/30/2020    Procedure: Right Heart Cath;  Surgeon: Artemio Ulloa MD;  Location:  HEART CARDIAC CATH LAB     CV RIGHT HEART CATH MEASUREMENTS RECORDED N/A 10/29/2020    Procedure: CV RIGHT HEART CATH;  Surgeon: Kit Bacon MD;  Location:  HEART CARDIAC CATH LAB     CV RIGHT HEART CATH MEASUREMENTS RECORDED N/A 1/5/2021    Procedure: CV RIGHT HEART CATH;  Surgeon: Carlin Garcia MD;  Location:  HEART CARDIAC CATH LAB     CV RIGHT HEART CATH MEASUREMENTS RECORDED N/A 6/23/2021    Procedure: CV RIGHT HEART CATH;  Surgeon: Brian Long MD;  Location:  HEART CARDIAC CATH LAB     CV RIGHT HEART CATH MEASUREMENTS RECORDED N/A 10/26/2021    Procedure: CV RIGHT HEART CATH;  Surgeon: Kit Bacon MD;  Location:  HEART CARDIAC CATH LAB     CV RIGHT HEART CATH MEASUREMENTS RECORDED N/A 5/18/2022    Procedure: Right Heart Catheterization;  Surgeon: Khris Mcclelland MD;  Location:  HEART CARDIAC CATH LAB     ENDOBRONCHIAL ULTRASOUND FLEXIBLE N/A 9/17/2020    Procedure: BRONCHOSCOPY, flexible, endobronchial ultrasound with transbronchial biopsies, endobronchial biopsies;  Surgeon: Bill Lemus MD;  Location:  OR      PRQ [id number] CORONARY ANGIOPLASTY ADDL BRANCH      PCI and ALYSSA to ostial LAD on 6/27/18     IMPLANT AUTOMATIC IMPLANTABLE CARDIOVERTER DEFIBRILLATOR       INSERT VENTRICULAR ASSIST DEVICE  LEFT (HEARTMATE II) N/A 12/31/2018    Procedure: Median Sternotomy, On Cardiopulmonary Bypass Pump, Insertion of Left Ventricular Assist Device (Heartmate III);  Surgeon: Kamaljit Baker MD;  [facility]     PICC DOUBLE LUMEN PLACEMENT Right 05/22/2020    5FR DL PICC inserted at right basilic vein, length 38cm.     TRANSPLANT HEART RECIPIENT AFTER HEART MATE N/A 5/18/2020    Procedure: REDO MEDIAN STERNOTOMY, LYSIS OF ADHESIONS, ON CARDIOPULMONARY BYPASS PUMP, HEART TRANSPLANT RECIPIENT, REMOVAL OF LEFT VENTRICULAR ASSIST DEVICE, REMOVAL OF AUTOMATED IMPLANTABLE CARDIOVERTER DEFIBRILLATOR;  Surgeon: Elder Willis MD;  [facility]       Physical Exam    No exam today.    RESULTS  Creatinine   Date Value Ref Range Status   02/15/2023 1.74 (H) 0.67 - 1.17 mg/dL Final   06/23/2021 1.93 (H) 0.66 - 1.25 mg/dL Final     GFR Estimate   Date Value Ref Range Status   02/15/2023 44 (L) >60 mL/min/1.73m2 Final     Comment:     eGFR calculated using 2021 CKD-EPI equation.   06/23/2021 37 (L) >60 mL/min/[1.73_m2] Final     Comment:     Non  GFR Calc  Starting 12/18/2018, serum creatinine based estimated GFR (eGFR) will be   calculated using the Chronic Kidney Disease Epidemiology Collaboration   (CKD-EPI) equation.       Hemoglobin A1C   Date Value Ref Range Status   02/15/2023   Corrected     Comment:     The previously reported result may be inaccurate due to a laboratory instrument calibration issue. Providers should order a new test to be performed if clinically indicated. [facility] will issue [facility] credit for this test.  This is a corrected result. Previous result was 9.6 % on 2/15/2023 at  5:30 PM CST   06/23/2021 10.3 (H) 0 - 5.6 % Final     Comment:     Normal <5.7% Prediabetes 5.7-6.4%  Diabetes 6.5% or higher - adopted from ADA   consensus guidelines.       Potassium   Date Value Ref Range Status   02/15/2023 3.7 3.4 - 5.3 mmol/L Final   05/18/2022 3.5 3.4 - 5.3 mmol/L  Final   06/23/2021 3.6 3.4 - 5.3 mmol/L Final     ALT   Date Value Ref Range Status   02/15/2023 26 10 - 50 U/L Final   06/23/2021 26 0 - 70 U/L Final     AST   Date Value Ref Range Status   02/15/2023 28 10 - 50 U/L Final   06/23/2021 22 0 - 45 U/L Final     TSH   Date Value Ref Range Status   03/03/2021 1.87 0.40 - 4.00 mU/L Final     T4 Free   Date Value Ref Range Status   03/03/2021 1.08 0.76 - 1.46 ng/dL Final       Cholesterol   Date Value Ref Range Status   02/15/2023 186 <200 mg/dL Final   05/18/2022 200 (H) <200 mg/dL Final   06/23/2021 168 <200 mg/dL Final   10/29/2020 161 <200 mg/dL Final     HDL Cholesterol   Date Value Ref Range Status   06/23/2021 49 >39 mg/dL Final   10/29/2020 51 >39 mg/dL Final     Direct Measure HDL   Date Value Ref Range Status   02/15/2023 39 (L) >=40 mg/dL Final   05/18/2022 26 (L) >=40 mg/dL Final     LDL Cholesterol Calculated   Date Value Ref Range Status   02/15/2023 106 (H) <=100 mg/dL Final   05/18/2022   Final     Comment:     Cannot estimate LDL when triglyceride exceeds 400 mg/dL   06/23/2021 75 <100 mg/dL Final     Comment:     Desirable:       <100 mg/dl   10/29/2020 81 <100 mg/dL Final     Comment:     Desirable:       <100 mg/dl     LDL Cholesterol Direct   Date Value Ref Range Status   10/26/2021 105 (H) <100 mg/dL Final     Comment:     Age 0-19 years:  Desirable: 0-110 mg/dL   Borderline high: 110-129 mg/dL   High: >= 130 mg/dL    Age 20 years and older:  Desirable: <100mg/dL  Above desirable: 100-129 mg/dL   Borderline high: 130-159 mg/dL   High: 160-189 mg/dL   Very high: >= 190 mg/dL     Triglycerides   Date Value Ref Range Status   02/15/2023 206 (H) <150 mg/dL Final   05/18/2022 1,670 (H) <150 mg/dL Corrected     Comment:     This is a corrected result. Previous result was 909 mg/dL on 5/18/2022 at 11:54 AM CDT   06/23/2021 220 (H) <150 mg/dL Final     Comment:     Borderline high:  150-199 mg/dl  High:             200-499 mg/dl  Very high:       >499  mg/dl     10/29/2020 144 <150 mg/dL Final       ASSESSMENT/PLAN:      1.  TYPE 2 DIABETES MELLITUS: Uncontrolled type 2 diabetes mellitus.  Pt instructed to take insulin as prescribed and to take insulin for ALL food intake including snacks and to take the Humalog BEFORE eating.  Continue Lantus 36 units subcutaneous at hs and continue Humalog 1 unit/5 gms CHO with meals and snacks,plus correction insulin 1 unit/25 for BG > 140 with meals and > 200 at bedtime. I reviewed his correction scale with him.   Continue Farxiga 10 mg daily.    Again, I reviewed how Farxiga works and possible side effects including dehydration, yeast infection and possible UTI.   Avoid use of Metformin due to CKD and lower GFR.  Will check to see if his insurance covers a GLP-1 drug.  Pt is unable to afford his DexcomG6 sensor supplies and apparently insurance will not cover a Freestyle Osiris sensor.  Pt was seen by Oph in Jan 2023 without retinopathy per patient.  He reports mild tingling in his feet.  No foot ulcers at this time.  He is taking ASA daily.    2.  CKD/ANDREW: Creat 1.74 with GFR 44 mL/min.  Avoid use of Metformin.    3.  HX OF ISCHEMIC CARDIOMYOPATHY: S/P heart transplant on 5/18/2020 and followed closely by transplant staff here.    4.  HTN: Reminded him to check his BP at home for the Cardiology/transplant staff.  Continue current RXs.    5.  WEIGHT GAIN: He has gained weight. Reminded him to make healthy food choices, reduce food portions with meals, try to avoid snacking and increase activity.  Check to see if GLP-1 drug is covered by his insurance - Medicare/[employer].     6  FOLLOW UP : with me in June or July 2023.    Time spent reviewing chart, labs and glucose data today = 8 minutes.  Time for video visit today = 16 minutes.  Time for documentation today = 15 minutes.    TOTAL TIME FOR VIDEO VISIT TODAY= 39  minutes.    Jeannette Schafer PA-C

## 2023-03-12 NOTE — PROGRESS NOTES
Due to the COVID 19 pandemic this visit was converted to a video visit in order to help prevent spread of infection in this patient and the general population.    Time of start: 11:32 am  Time of end: 11:48 am  Total duration of video visit: 16 minutes.  Providers location: offsite.  Patients location: [place].    [facility] [place]  Mariusz ( Red Sharp is a 62 year old male with type 2 diabetes mellitus. Video visit today for diabetes care.  Pt was admitted 5/17/2020-5/29/2020 and underwent a heart transplant on 5/18/2020.  Pt's hx is significant for type 2 diabetes mellitus dx 5 + years ago, HTN, hyperlipidemia, hx of ischemic heart disease s/p STEMI with ALYSSA 2019, LVAD placement, Stage 3 CKD, and obesity.  Red was also admitted 5/12-5/15/2020 for ANDREW due to nephrolithiasis complicated by hydronephrosis/ANDREW.  For his diabetes, pt is prescribed to take  Lantus 36 units subcutaneous each pm and  Humalog 1 unit/5 gms CHO with meals with correction scale  ( 1 unit/25 for BG > 150 ) and bedtime correction scale ( 1 unit/25 for BG > 200).   He admits to missing some insulin doses.  He is also taking Farxiga 10 mg each am.  Most recent A1C was 8.3 % on 2/15/2023 and previous A1C was 8.6 % on 2/23/2022.   Pt's A1C A1C was 10.3 % on 6/23/2021.  His insurance will no longer cover his DexcomG6 sensor supplies, so he is not using his Dexcom sensor.  Apparently his insurance will not cover a Freestyle Osiris sensor.  I do not have his glucose meter download data today.  Pt provided me with blood sugar reading which I reviewed and scanned in his note below.  As above, he has missed some Humalog doses.  On ROS today, mild tingling in both feet. Denies foot ulcers/sores.  Cough when he is out in the cold air. No fever. Worse since COVID in 12/2022.  Pt denies blurred vision, n/v, SOB at rest, rigors, abd pain, diarrhea, dysuria or hematuria.    Diabetes Care  Retinopathy:none; pt seen by Oph in Jan 2023 with no retinopathy per  patient.  Nephropathy:yes- hx of CKD/ANDREW.  Neuropathy: mild tingling in both feet.  Foot Exam:no exam today.  Taking aspirin: yes.  Lipids:  in Oct 2021. Pt is taking Crestor.  CAD: yes; s/p heart transplant on 5/18/2020.  Depression: no.  Insulin: Basal and meal time insulin with correction scale ( 1 unit/25 for BG > 150 with meals and > 200 at bedtime).    DM meds: Farxiga.  Consider adding Ozempic today- will check to see if his insurance will cover a GLP-1 drug.  Testing: glucose meter.  Pt has a DexcomG6 sensor - not using sensor since his insurance will not cover his Dexcom supplies. Insurance will not cover Freestyle Osiris sensor.      ROS  See under HPI.    Allergies  No Known Allergies    Medications  Current Outpatient Medications   Medication Sig Dispense Refill     ACCU-CHEK [place] PLUS test strip Check BG 3 times daily at meals and at bedtime. 400 strip 3     acetaminophen (TYLENOL) 325 MG tablet Take 2 tablets (650 mg) by mouth every 4 hours as needed for other (multimodal surgical pain management along with NSAIDS and opioid medication as indicated based on pain control and physical function.)       amLODIPine (NORVASC) 10 MG tablet Take 1 tablet (10 mg) by mouth daily 90 tablet 3     aspirin (ASA) 81 MG EC tablet Take 1 tablet (81 mg) by mouth daily 30 tablet 11     BD SILVANA U/F 32G X 4 MM insulin pen needle Use 6 times daily. 600 each 3     calcium carbonate 600 mg-vitamin D 400 units (CALTRATE) 600-400 MG-UNIT per tablet Take 1 tablet by mouth 2 times daily (with meals)       dapagliflozin (FARXIGA) 10 MG TABS tablet Take 1 tablet (10 mg) by mouth daily 90 tablet 3     everolimus (ZORTRESS) 0.5 MG tablet Take 3 tablets (1.5 mg) by mouth 2 times daily 540 tablet 3     HUMALOG KWIKPEN 100 UNIT/ML soln INJECT ONE UNIT PER EVERY 5 GRAMS OF CARBS WITH MEALS AND SNACKS, PLUS CORRECTION, APPROX 70 UNITS IN 24 HOURS 75 mL 3     hydrALAZINE (APRESOLINE) 50 MG tablet Take 2.5 tablets (125 mg) by mouth 3  times daily 675 tablet 3     insulin glargine (LANTUS PEN) 100 UNIT/ML pen Inject 36 units subcutaneous at hs. 15 mL 3     loperamide (IMODIUM) 2 MG capsule Take 1 capsule (2 mg) by mouth 4 times daily as needed for diarrhea 60 capsule 0     magnesium oxide (MAG-OX) 400 (241.3 Mg) MG tablet Take 1 tablet (400 mg) by mouth daily 90 tablet 3     multivitamin w/minerals (THERA-VIT-M) tablet Take 1 tablet by mouth daily 90 tablet 3     mycophenolate (GENERIC EQUIVALENT) 250 MG capsule Take 4 capsules (1,000 mg) by mouth 2 times daily 240 capsule 11     pantoprazole (PROTONIX) 40 MG EC tablet Take 1 tablet (40 mg) by mouth every morning (before breakfast) 90 tablet 3     potassium chloride ER (KLOR-CON M) 20 MEQ CR tablet Take 1 tablet (20 mEq) by mouth daily 90 tablet 3     rosuvastatin (CRESTOR) 20 MG tablet Take 1 tablet (20 mg) by mouth daily 90 tablet 3     torsemide (DEMADEX) 20 MG tablet Take 1 tablet (20 mg) by mouth daily 90 tablet 3       Family History  Mother and father with hx of type 2 DM.  Social History   reports that he has never smoked. He has never used smokeless tobacco. He reports that he does not drink alcohol and does not use drugs.     Past Medical History  Past Medical History:   Diagnosis Date     Acute kidney injury (H)      CKD (chronic kidney disease)      Coronary artery disease      Diabetes mellitus (H)      HTN (hypertension)      Hyperlipidemia      ICD (implantable cardioverter-defibrillator), single chamber- NOT dependent 7/17/2018     Ischemic cardiomyopathy      LV (left ventricular) mural thrombus      Paroxysmal atrial flutter (H)      RVF (right ventricular failure) (H)      Systolic heart failure (H)      Ventricular tachycardia        Past Surgical History:   Procedure Laterality Date     BRONCHOSCOPY (RIGID OR FLEXIBLE), DIAGNOSTIC N/A 9/15/2020    Procedure: BRONCHOSCOPY, WITH BRONCHOALVEOLAR LAVAGE;  Surgeon: Elder Mejia MD;  Location:  GI     BRONCHOSCOPY (RIGID  OR FLEXIBLE), DIAGNOSTIC N/A 9/17/2020    Procedure: BRONCHOSCOPY, WITH BRONCHOALVEOLAR LAVAGE;  Surgeon: Bill Lemus MD;  Location: [facility]     BRONCHOSCOPY RIGID OR FLEXIBLE W/TRANSENDOSCOPIC ENDOBRONCHIAL ULTRASOUND GUIDED Right 9/8/2020    Procedure: BRONCHOSCOPY, WITH ENDOBRONCHIAL ULTRASOUND;  Surgeon: Donny Mercedes MD;  Location:  GI     COLONOSCOPY N/A 12/7/2018    Procedure: COLONOSCOPY;  Surgeon: Krish Mercado MD;  Location: [facility]     CV CORONARY ANGIOGRAM N/A 6/23/2021    Procedure: CV CORONARY ANGIOGRAM;  Surgeon: Brian Long MD;  Location:  HEART CARDIAC CATH LAB     CV CORONARY ANGIOGRAM N/A 5/18/2022    Procedure: Coronary Angiogram [[id number]];  Surgeon: Khris Mcclelland MD;  Location:  HEART CARDIAC CATH LAB     CV HEART BIOPSY N/A 5/24/2020    Procedure: Heart Biopsy;  Surgeon: Kit Bacon MD;  Location:  HEART CARDIAC CATH LAB     CV HEART BIOPSY N/A 6/1/2020    Procedure: CV HEART BIOPSY;  Surgeon: Kit Bacon MD;  Location:  HEART CARDIAC CATH LAB     CV HEART BIOPSY N/A 6/8/2020    Procedure: CV HEART BIOPSY;  Surgeon: Kit Bacon MD;  Location:  HEART CARDIAC CATH LAB     CV HEART BIOPSY N/A 6/15/2020    Procedure: CV HEART BIOPSY;  Surgeon: Kit Bacon MD;  Location:  HEART CARDIAC CATH LAB     CV HEART BIOPSY N/A 6/30/2020    Procedure: CV HEART BIOPSY;  Surgeon: Kit Bacon MD;  Location:  HEART CARDIAC CATH LAB     CV HEART BIOPSY N/A 7/14/2020    Procedure: CV HEART BIOPSY;  Surgeon: Brian Long MD;  Location:  HEART CARDIAC CATH LAB     CV HEART BIOPSY N/A 7/29/2020    Procedure: CV HEART BIOPSY;  Surgeon: Momo Hunt MD;  Location:  HEART CARDIAC CATH LAB     CV HEART BIOPSY N/A 8/13/2020    Procedure: CV HEART BIOPSY;  Surgeon: Khris Mcclelland MD;  Location:  HEART CARDIAC CATH LAB     CV HEART BIOPSY N/A 8/26/2020    Procedure:  CV HEART BIOPSY;  Surgeon: Momo Hunt MD;  [facility]     CV HEART BIOPSY N/A 9/30/2020    Procedure: CV HEART BIOPSY;  Surgeon: Artemio Ulloa MD;  [facility]     CV HEART BIOPSY N/A 10/29/2020    Procedure: CV HEART BIOPSY;  Surgeon: Kit Bacon MD;  [facility]     CV HEART BIOPSY N/A 1/5/2021    Procedure: CV HEART BIOPSY;  Surgeon: Carlin Garcia MD;  [facility]     CV HEART BIOPSY N/A 6/23/2021    Procedure: CV HEART BIOPSY;  Surgeon: Brian Long MD;  [facility]     CV HEART BIOPSY N/A 10/26/2021    Procedure: CV HEART BIOPSY;  Surgeon: Kit Bacon MD;  [facility]     CV HEART BIOPSY N/A 5/18/2022    Procedure: Heart Biopsy;  Surgeon: Khris Mcclelland MD;  [facility]     CV RIGHT HEART CATH MEASUREMENTS RECORDED N/A 2/19/2019    Procedure: RHC;  Surgeon: Brian Long MD;  Location:  HEART CARDIAC CATH LAB     CV RIGHT HEART CATH MEASUREMENTS RECORDED N/A 7/5/2019    Procedure: CV RIGHT HEART CATH;  Surgeon: Khris Mcclelland MD;  [facility]     CV RIGHT HEART CATH MEASUREMENTS RECORDED N/A 5/24/2020    Procedure: Right Heart Cath;  Surgeon: Kit Bacon MD;  Location: [facility]     CV RIGHT HEART CATH MEASUREMENTS RECORDED N/A 6/1/2020    Procedure: CV RIGHT HEART CATH;  Surgeon: Kit Bacon MD;  [facility]     CV RIGHT HEART CATH MEASUREMENTS RECORDED N/A 6/8/2020    Procedure: CV RIGHT HEART CATH;  Surgeon: Kit Bacon MD;  [facility]     CV RIGHT HEART CATH MEASUREMENTS RECORDED N/A 6/15/2020    Procedure: CV RIGHT HEART CATH;  Surgeon: Kit Bacon MD;  Location:  HEART CARDIAC CATH LAB      CV RIGHT HEART CATH MEASUREMENTS RECORDED N/A 6/30/2020    Procedure: CV RIGHT HEART CATH;  Surgeon: Kit Bacon MD;  Location:  HEART CARDIAC CATH LAB     CV RIGHT HEART CATH MEASUREMENTS RECORDED N/A 7/14/2020    Procedure: CV RIGHT HEART CATH;  Surgeon: Brian Long MD;  Location:  HEART CARDIAC CATH LAB     CV RIGHT HEART CATH MEASUREMENTS RECORDED N/A 7/29/2020    Procedure: CV RIGHT HEART CATH;  Surgeon: Momo Hunt MD;  Location:  HEART CARDIAC CATH LAB     CV RIGHT HEART CATH MEASUREMENTS RECORDED N/A 8/13/2020    Procedure: CV RIGHT HEART CATH;  Surgeon: Khris Mcclelland MD;  Location:  HEART CARDIAC CATH LAB     CV RIGHT HEART CATH MEASUREMENTS RECORDED N/A 8/26/2020    Procedure: CV RIGHT HEART CATH;  Surgeon: Momo Hunt MD;  Location:  HEART CARDIAC CATH LAB     CV RIGHT HEART CATH MEASUREMENTS RECORDED N/A 9/30/2020    Procedure: Right Heart Cath;  Surgeon: Artemio Ulloa MD;  Location:  HEART CARDIAC CATH LAB     CV RIGHT HEART CATH MEASUREMENTS RECORDED N/A 10/29/2020    Procedure: CV RIGHT HEART CATH;  Surgeon: Kit Bacon MD;  Location:  HEART CARDIAC CATH LAB     CV RIGHT HEART CATH MEASUREMENTS RECORDED N/A 1/5/2021    Procedure: CV RIGHT HEART CATH;  Surgeon: Carlin Garcia MD;  Location:  HEART CARDIAC CATH LAB     CV RIGHT HEART CATH MEASUREMENTS RECORDED N/A 6/23/2021    Procedure: CV RIGHT HEART CATH;  Surgeon: Brian Long MD;  Location:  HEART CARDIAC CATH LAB     CV RIGHT HEART CATH MEASUREMENTS RECORDED N/A 10/26/2021    Procedure: CV RIGHT HEART CATH;  Surgeon: Kit Bacon MD;  Location:  HEART CARDIAC CATH LAB     CV RIGHT HEART CATH MEASUREMENTS RECORDED N/A 5/18/2022    Procedure: Right Heart Catheterization;  Surgeon: Khris Mcclelland MD;  Location: [facility] CARDIAC CATH LAB     ENDOBRONCHIAL ULTRASOUND FLEXIBLE N/A 9/17/2020    Procedure: BRONCHOSCOPY,  flexible, endobronchial ultrasound with transbronchial biopsies, endobronchial biopsies;  Surgeon: Bill Lemus MD;  [facility]     HC PRQ [id number] CORONARY ANGIOPLASTY ADDL BRANCH      PCI and ALYSSA to ostial LAD on 6/27/18     IMPLANT AUTOMATIC IMPLANTABLE CARDIOVERTER DEFIBRILLATOR       INSERT VENTRICULAR ASSIST DEVICE LEFT (HEARTMATE II) N/A 12/31/2018    Procedure: Median Sternotomy, On Cardiopulmonary Bypass Pump, Insertion of Left Ventricular Assist Device (Heartmate III);  Surgeon: Kamaljit Baker MD;  [facility]     PICC DOUBLE LUMEN PLACEMENT Right 05/22/2020    5FR DL PICC inserted at right basilic vein, length 38cm.     TRANSPLANT HEART RECIPIENT AFTER HEART MATE N/A 5/18/2020    Procedure: REDO MEDIAN STERNOTOMY, LYSIS OF ADHESIONS, ON CARDIOPULMONARY BYPASS PUMP, HEART TRANSPLANT RECIPIENT, REMOVAL OF LEFT VENTRICULAR ASSIST DEVICE, REMOVAL OF AUTOMATED IMPLANTABLE CARDIOVERTER DEFIBRILLATOR;  Surgeon: Elder Willis MD;  [facility]       Physical Exam    No exam today.    RESULTS  Creatinine   Date Value Ref Range Status   02/15/2023 1.74 (H) 0.67 - 1.17 mg/dL Final   06/23/2021 1.93 (H) 0.66 - 1.25 mg/dL Final     GFR Estimate   Date Value Ref Range Status   02/15/2023 44 (L) >60 mL/min/1.73m2 Final     Comment:     eGFR calculated using 2021 CKD-EPI equation.   06/23/2021 37 (L) >60 mL/min/[1.73_m2] Final     Comment:     Non  GFR Calc  Starting 12/18/2018, serum creatinine based estimated GFR (eGFR) will be   calculated using the Chronic Kidney Disease Epidemiology Collaboration   (CKD-EPI) equation.       Hemoglobin A1C   Date Value Ref Range Status   02/15/2023   Corrected     Comment:     The previously reported result may be inaccurate due to a laboratory instrument calibration issue. Providers should order a new test to be performed if clinically indicated. [employer] will issue a credit for this test.  This is a corrected result.  Previous result was 9.6 % on 2/15/2023 at  5:30 PM CST   06/23/2021 10.3 (H) 0 - 5.6 % Final     Comment:     Normal <5.7% Prediabetes 5.7-6.4%  Diabetes 6.5% or higher - adopted from ADA   consensus guidelines.       Potassium   Date Value Ref Range Status   02/15/2023 3.7 3.4 - 5.3 mmol/L Final   05/18/2022 3.5 3.4 - 5.3 mmol/L Final   06/23/2021 3.6 3.4 - 5.3 mmol/L Final     ALT   Date Value Ref Range Status   02/15/2023 26 10 - 50 U/L Final   06/23/2021 26 0 - 70 U/L Final     AST   Date Value Ref Range Status   02/15/2023 28 10 - 50 U/L Final   06/23/2021 22 0 - 45 U/L Final     TSH   Date Value Ref Range Status   03/03/2021 1.87 0.40 - 4.00 mU/L Final     T4 Free   Date Value Ref Range Status   03/03/2021 1.08 0.76 - 1.46 ng/dL Final       Cholesterol   Date Value Ref Range Status   02/15/2023 186 <200 mg/dL Final   05/18/2022 200 (H) <200 mg/dL Final   06/23/2021 168 <200 mg/dL Final   10/29/2020 161 <200 mg/dL Final     HDL Cholesterol   Date Value Ref Range Status   06/23/2021 49 >39 mg/dL Final   10/29/2020 51 >39 mg/dL Final     Direct Measure HDL   Date Value Ref Range Status   02/15/2023 39 (L) >=40 mg/dL Final   05/18/2022 26 (L) >=40 mg/dL Final     LDL Cholesterol Calculated   Date Value Ref Range Status   02/15/2023 106 (H) <=100 mg/dL Final   05/18/2022   Final     Comment:     Cannot estimate LDL when triglyceride exceeds 400 mg/dL   06/23/2021 75 <100 mg/dL Final     Comment:     Desirable:       <100 mg/dl   10/29/2020 81 <100 mg/dL Final     Comment:     Desirable:       <100 mg/dl     LDL Cholesterol Direct   Date Value Ref Range Status   10/26/2021 105 (H) <100 mg/dL Final     Comment:     Age 0-19 years:  Desirable: 0-110 mg/dL   Borderline high: 110-129 mg/dL   High: >= 130 mg/dL    Age 20 years and older:  Desirable: <100mg/dL  Above desirable: 100-129 mg/dL   Borderline high: 130-159 mg/dL   High: 160-189 mg/dL   Very high: >= 190 mg/dL     Triglycerides   Date Value Ref Range Status    02/15/2023 206 (H) <150 mg/dL Final   05/18/2022 1,670 (H) <150 mg/dL Corrected     Comment:     This is a corrected result. Previous result was 909 mg/dL on 5/18/2022 at 11:54 AM CDT   06/23/2021 220 (H) <150 mg/dL Final     Comment:     Borderline high:  150-199 mg/dl  High:             200-499 mg/dl  Very high:       >499 mg/dl     10/29/2020 144 <150 mg/dL Final       ASSESSMENT/PLAN:      1.  TYPE 2 DIABETES MELLITUS: Uncontrolled type 2 diabetes mellitus.  Pt instructed to take insulin as prescribed and to take insulin for ALL food intake including snacks and to take the Humalog BEFORE eating.  Continue Lantus 36 units subcutaneous at hs and continue Humalog 1 unit/5 gms CHO with meals and snacks,plus correction insulin 1 unit/25 for BG > 140 with meals and > 200 at bedtime. I reviewed his correction scale with him.   Continue Farxiga 10 mg daily.    Again, I reviewed how Farxiga works and possible side effects including dehydration, yeast infection and possible UTI.   Avoid use of Metformin due to CKD and lower GFR.  Will check to see if his insurance covers a GLP-1 drug.  Pt is unable to afford his DexcomG6 sensor supplies and apparently insurance will not cover a Freestyle Osiris sensor.  Pt was seen by Oph in Jan 2023 without retinopathy per patient.  He reports mild tingling in his feet.  No foot ulcers at this time.  He is taking ASA daily.    2.  CKD/ANDREW: Creat 1.74 with GFR 44 mL/min.  Avoid use of Metformin.    3.  HX OF ISCHEMIC CARDIOMYOPATHY: S/P heart transplant on 5/18/2020 and followed closely by transplant staff here.    4.  HTN: Reminded him to check his BP at home for the Cardiology/transplant staff.  Continue current RXs.    5.  WEIGHT GAIN: He has gained weight. Reminded him to make healthy food choices, reduce food portions with meals, try to avoid snacking and increase activity.  Check to see if GLP-1 drug is covered by his insurance - Medicare/[employer].     6  FOLLOW UP : with  me in June or July 2023.    Time spent reviewing chart, labs and glucose data today = 8 minutes.  Time for video visit today = 16 minutes.  Time for documentation today = 15 minutes.    TOTAL TIME FOR VIDEO VISIT TODAY= 39  minutes.    Jeannette Schafer PA-C

## 2023-03-23 ENCOUNTER — VIRTUAL VISIT (OUTPATIENT)
Dept: PHARMACY | Facility: CLINIC | Age: 63
End: 2023-03-23

## 2023-03-23 DIAGNOSIS — Z79.4 TYPE 2 DIABETES MELLITUS WITH HYPERGLYCEMIA, WITH LONG-TERM CURRENT USE OF INSULIN (H): Primary | ICD-10-CM

## 2023-03-23 DIAGNOSIS — E11.65 TYPE 2 DIABETES MELLITUS WITH HYPERGLYCEMIA, WITH LONG-TERM CURRENT USE OF INSULIN (H): Primary | ICD-10-CM

## 2023-03-23 PROCEDURE — 99207 PR NO CHARGE LOS: CPT

## 2023-03-23 NOTE — PROGRESS NOTES
Disease State Management Encounter:                          Mariusz Sharp is a 62 year old male called for for an initial visit. He was referred to me from Leticia Schafer PA-C.      Reason for visit: GLP-1/Osiris access.     Medication Adherence/Access: Patient referred to me by Leticia Schafer to check coverage for GLP-1 agonist and osiris.    Type 2 Diabetes:   Diabetes Medication(s)     Insulin       HUMALOG KWIKPEN 100 UNIT/ML soln    INJECT ONE UNIT PER EVERY 5 GRAMS OF CARBS WITH MEALS AND SNACKS, PLUS CORRECTION, APPROX 70 UNITS IN 24 HOURS     insulin glargine (LANTUS PEN) 100 UNIT/ML pen    Inject 36 units subcutaneous at hs.    Sodium-Glucose Co-Transporter 2 (SGLT2) Inhibitors       dapagliflozin (FARXIGA) 10 MG TABS tablet    Take 1 tablet (10 mg) by mouth daily    Incretin Mimetic Agents       semaglutide (OZEMPIC, 0.25 OR 0.5 MG/DOSE,) 2 MG/3ML SOPN pen    Inject 0.25 mg Subcutaneous once a week for 28 days, THEN 0.5 mg once a week for 28 days.      Did not obtain blood sugar data today due to time.  We will follow-up on this on Friday when we trained on osiris and Ozempic.  Aspirin: Taking 81mg daily and denies side effects   The ASCVD Risk score (Devine DK, et al., 2019) failed to calculate for the following reasons:    The patient has a prior MI or stroke diagnosis  Statin: Yes: Crestor 20 mg daily  ACEi/ARB: No.   Urine Albumin: No results found for: UMALCR   Lab Results   Component Value Date    A1C  02/15/2023      Comment:      The previously reported result may be inaccurate due to a laboratory instrument calibration issue. Providers should order a new test to be performed if clinically indicated. [facility] [employer] will issue a credit for this test.  This is a corrected result. Previous result was 9.6 % on 2/15/2023 at  5:30 PM CST    A1C 8.3 05/18/2022    A1C 8.6 02/23/2022    A1C 9.4 10/26/2021    A1C 10.3 06/23/2021    A1C 10.0 03/03/2021    A1C 6.2 10/29/2020    A1C 7.4 08/19/2020    A1C  "7.2 08/13/2020     Lab Results   Component Value Date    GFRESTIMATED 44 (L) 02/15/2023    GFRESTIMATED 49 (L) 05/18/2022    GFRESTIMATED 40 (L) 02/23/2022     Estimated body mass index is 31.4 kg/m  as calculated from the following:    Height as of 2/15/23: 5' 5.04\" (1.652 m).    Weight as of 2/15/23: 188 lb 14.4 oz (85.7 kg).     BP Readings from Last 1 Encounters:   02/15/23 129/89     Pulse Readings from Last 1 Encounters:   02/15/23 115     Wt Readings from Last 1 Encounters:   02/15/23 188 lb 14.4 oz (85.7 kg)     Ht Readings from Last 1 Encounters:   02/15/23 5' 5.04\" (1.652 m)     Estimated body mass index is 31.4 kg/m  as calculated from the following:    Height as of 2/15/23: 5' 5.04\" (1.652 m).    Weight as of 2/15/23: 188 lb 14.4 oz (85.7 kg).    Temp Readings from Last 1 Encounters:   05/18/22 97.6  F (36.4  C) (Oral)       Assessment/Plan:    1. Ran claims for GLP-1 agonists for this patient. GLP-1s are $254.06/month.  Patient is agreeable to this co-pay as he meets his deductible in a few months in which all prescriptions will be $0.  We will go with [facility] and train on Friday 4/7/23.    2.   Ring claims for Freestyle Osiris.  This is covered at no charge for the patient.  We will send order and provide video education on Friday 4/7/23.    Follow-up: 4/7/23 with Warner for education/DM f/up    I spent 15 minutes with this patient today. All changes were made via collaborative practice agreement with Leticia Schafer PA-C. A copy of the visit note was provided to the patient's provider(s).    A summary of these recommendations was given to the patient.    Warner Cedeño, PharmD  [facility] Pharmacist  [address] [place] [id number]  Direct Voicemail: [phone]     Medication Therapy Recommendations  Type 2 diabetes mellitus with hyperglycemia (H)    Current Medication: Continuous Blood Gluc Sensor (FREESTYLE OSIRIS 2 SENSOR) MISC   Rationale: Synergistic therapy - Needs additional " medication therapy - Indication   Recommendation: Start Medication   Status: Accepted per CPA          Current Medication: semaglutide (OZEMPIC, 0.25 OR 0.5 MG/DOSE,) 2 MG/3ML SOPN pen   Rationale: Synergistic therapy - Needs additional medication therapy - Indication   Recommendation: Start Medication   Status: Accepted per CPA

## 2023-03-27 ENCOUNTER — LAB (OUTPATIENT)
Dept: LAB | Facility: CLINIC | Age: 63
End: 2023-03-27
Payer: COMMERCIAL

## 2023-03-27 DIAGNOSIS — Z94.1 STATUS POST HEART TRANSPLANTATION (H): ICD-10-CM

## 2023-03-27 PROCEDURE — 80169 DRUG ASSAY EVEROLIMUS: CPT

## 2023-03-31 LAB
EVEROLIMUS BLD-MCNC: 7.1 UG/L (ref 3–8)
TME LAST DOSE: NORMAL H
TME LAST DOSE: NORMAL H

## 2023-04-05 RX ORDER — SEMAGLUTIDE 0.68 MG/ML
INJECTION, SOLUTION SUBCUTANEOUS
Qty: 3 ML | Refills: 3 | Status: SHIPPED | OUTPATIENT
Start: 2023-04-05 | End: 2023-05-31

## 2023-05-04 ENCOUNTER — TELEPHONE (OUTPATIENT)
Dept: TRANSPLANT | Facility: CLINIC | Age: 63
End: 2023-05-04
Payer: COMMERCIAL

## 2023-05-04 DIAGNOSIS — Z13.220 LIPID SCREENING: ICD-10-CM

## 2023-05-04 DIAGNOSIS — E11.9 DIABETES MELLITUS, TYPE 2 (H): ICD-10-CM

## 2023-05-04 DIAGNOSIS — Z94.1 HEART REPLACED BY TRANSPLANT (H): Primary | ICD-10-CM

## 2023-05-04 RX ORDER — ASPIRIN 325 MG
325 TABLET ORAL ONCE
Status: CANCELLED | OUTPATIENT
Start: 2023-05-04 | End: 2023-05-04

## 2023-05-04 RX ORDER — POTASSIUM CHLORIDE 1500 MG/1
20 TABLET, EXTENDED RELEASE ORAL
Status: CANCELLED | OUTPATIENT
Start: 2023-05-04

## 2023-05-04 RX ORDER — POTASSIUM CHLORIDE 1500 MG/1
40 TABLET, EXTENDED RELEASE ORAL
Status: CANCELLED | OUTPATIENT
Start: 2023-05-04

## 2023-05-04 RX ORDER — LIDOCAINE 40 MG/G
CREAM TOPICAL
Status: CANCELLED | OUTPATIENT
Start: 2023-05-04

## 2023-05-04 RX ORDER — ASPIRIN 81 MG/1
243 TABLET, CHEWABLE ORAL ONCE
Status: CANCELLED | OUTPATIENT
Start: 2023-05-04

## 2023-05-04 RX ORDER — SODIUM CHLORIDE 9 MG/ML
INJECTION, SOLUTION INTRAVENOUS CONTINUOUS
Status: CANCELLED | OUTPATIENT
Start: 2023-05-04

## 2023-05-05 NOTE — LETTER
1/23/2019      RE: Mariusz Sharp  [address] [facility] [address]       Dear Colleague,    Thank you for the opportunity to participate in the care of your patient, Mariusz Sharp, at the [facility]. Please see a copy of my visit note below.    HPI:   Mr. 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 Hospital course notable for mild RV dysfunction improved with dobutamine. He was discharged to TCU where he stayed for several days before he was discharged to home. Unfortunately, he presented to the ED after a mechanical fall and subsequent head injury with no LOC. Head CT was negative. Early this week, his hydralazine was doubled with high MAPs. He returns to clinic for follow up.    Red reports no shortness of breath, rare fleeting chest pain, occasional palpitations, no lightheadedness, syncope, slight and improving lower extremity edema, no anorexia, orthopnea, or PND.    Driveline site is slightly more red than prior. No fevers or chills, discharge or tenderness. No melena, hematuria, or significant epistaxis. No focal deficits.     PAST MEDICAL HISTORY:  Past Medical History:   Diagnosis Date     Acute kidney injury (H)      CKD (chronic kidney disease)      Coronary artery disease      Diabetes mellitus (H)      HTN (hypertension)      Hyperlipidemia      Ischemic cardiomyopathy      Paroxysmal atrial flutter (H)      Systolic heart failure (H)      Ventricular tachycardia (H)        FAMILY HISTORY:  No family history on file.    SOCIAL HISTORY:  Social History     Social History     Marital status: Single     Spouse name: N/A     Number of children: N/A     Years of education: N/A     Social History Main Topics     Smoking  status: Never Smoker     Smokeless tobacco: Never Used     Alcohol use Not on file     Drug use: Not on file     Sexual activity: Not on file     Other Topics Concern     Not on file     Social History Narrative       CURRENT MEDICATIONS:  Outpatient Medications Prior to Visit   Medication Sig Dispense Refill     acetaminophen (TYLENOL) 325 MG tablet Take 2 tablets (650 mg) by mouth every 6 hours as needed for pain 1 Bottle 0     amiodarone (PACERONE/CODARONE) 200 MG tablet Take 1 tablet (200 mg) by mouth daily 30 tablet 0     aspirin (ASA) 81 MG chewable tablet Take 1 tablet (81 mg) by mouth daily 30 tablet 0     digoxin (LANOXIN) 125 MCG tablet Take 1 tablet (125 mcg) by mouth daily 30 tablet 0     famotidine (PEPCID) 20 MG tablet Take 1 tablet (20 mg) by mouth 2 times daily 60 tablet 0     furosemide (LASIX) 20 MG tablet Take 1 tablet (20 mg) by mouth daily 30 tablet 0     hydrALAZINE (APRESOLINE) 50 MG tablet Take 1 tablet (50 mg) by mouth 3 times daily 90 tablet 0     insulin aspart (NOVOLOG PEN) 100 UNIT/ML pen Inject 1-10 Units Subcutaneous 3 times daily (before meals) Correction Scale - HIGH INSULIN RESISTANCE DOSING     -164 =1 units.   -189 =2.   -214 =3.   -239 =4.   -264 =5.   -289 =6.   -314 =7.   -339 =8.   -364 =9.  BG greater than or equal to 365 give 10 units  To be given with meal insulin, based on pre-meal BG 9 mL 0     insulin glargine (LANTUS SOLOSTAR PEN) 100 UNIT/ML pen Inject 20 Units Subcutaneous At Bedtime 6 mL 0     nitroGLYcerin (NITROSTAT) 0.4 MG sublingual tablet Place 1 tablet (0.4 mg) under the tongue every 5 minutes as needed for chest pain For chest pain place 1 tablet under the tongue every 5 minutes for 3 doses. If symptoms persist 5 minutes after 1st dose call 911. 10 tablet 0     polyethylene glycol (MIRALAX/GLYCOLAX) packet Take 17 g by mouth daily as needed for constipation 14 packet 0     rosuvastatin (CRESTOR) 20 MG  tablet Take 1 tablet (20 mg) by mouth At Bedtime 30 tablet 0     senna-docusate (SENOKOT-S/PERICOLACE) 8.6-50 MG tablet Take 1 tablet by mouth daily as needed for constipation hold for loose stools 60 tablet 0     warfarin (COUMADIN) 2.5 MG tablet To take dose as directed by your INR clinic 200 tablet 1     insulin aspart (NOVOLOG PEN) 100 UNIT/ML pen Inject 1-7 Units Subcutaneous At Bedtime HIGH INSULIN RESISTANCE DOSING    Bedtime  For  - 224 give 1 units.   For  - 249 give 2 units.   For  - 274 give 3 units.   For  - 299 give 4 units.   For  - 324 give 5 units.   For  - 349 give 6 units.   For BG greater than or equal to 350 give 7 units. (Patient not taking: Reported on 2019) 2.1 mL 0     insulin aspart (NOVOLOG PEN) 100 UNIT/ML pen Prandial Dosin units per 10 grams of carbohydrate each Meal or Snack.  Only chart total amount of units given.  Do not give if pre-prandial glucose is less than 60 mg/dL. If given at mealtime, administer within 30 minutes of start of meal. (Patient not taking: Reported on 2019)       No facility-administered medications prior to visit.      ROS:   CONSTITUTIONAL: Denies fever, chills, fatigue, or weight fluctuations.   HEENT: Denies headache and changes in speech. He complains of vision changes.   CV: Refer to HPI.   PULMONARY:Denies shortness of breath, cough, or previous TB exposure.   GI:Denies nausea, vomiting, diarrhea, and abdominal pain. Bowel movements are regular.   :Denies urinary alterations, dysuria, urinary frequency, hematuria, and abnormal drainage.   EXT:Denies lower extremity edema.   SKIN:Denies abnormal rashes or lesions.   MUSCULOSKELETAL:Denies upper or lower extremity weakness and pain.   NEUROLOGIC:Denies lightheadedness, dizziness, seizures, or upper or lower extremity paresthesia.     EXAM:  BP (!) 98/10 (BP Location: Left arm, Patient Position: Chair, Cuff Size: Adult Regular)   Pulse 74   Ht 1.626 m (5'  "4\")   Wt 80.8 kg (178 lb 1.6 oz)   SpO2 100%   BMI 30.57 kg/m     GENERAL: Appears alert and oriented times three.   HEENT: Eye symmetrical and free of discharge bilaterally. Mucous membranes moist and without lesions.  NECK: Supple and without lymphadenopathy.   CV: pulsatile. Mechanical hum of LVAD. NO JVD  RESPIRATORY: Respirations regular, even, and unlabored. Lungs CTA throughout.   GI: Soft and non distended with normoactive bowel sounds present in all quadrants. No tenderness, rebound, guarding. No organomegaly.    EXTREMITIES: No peripheral edema.   NEUROLOGIC: Alert and orientated x 3. CN II-XII grossly intact. No focal deficits.   MUSCULOSKELETAL: No joint swelling or tenderness.   SKIN: No jaundice. No rashes or lesions.     VAD Interrogation on 1/23/19: VAD interrogation reviewed with VAD coordinator. Agree with findings. History is only 12 hours long due to very frequent PI Events and low flow alarms. No  power spikes or other findings suspicious of pump malfunction noted.     Labs:  CBC RESULTS:  Lab Results   Component Value Date    WBC 8.0 01/23/2019    RBC 3.57 (L) 01/23/2019    HGB 9.8 (L) 01/23/2019    HCT 32.4 (L) 01/23/2019    MCV 91 01/23/2019    MCH 27.5 01/23/2019    MCHC 30.2 (L) 01/23/2019    RDW 16.0 (H) 01/23/2019     01/23/2019       CMP RESULTS:  Lab Results   Component Value Date     01/23/2019    POTASSIUM 4.4 01/23/2019    CHLORIDE 109 01/23/2019    CO2 22 01/23/2019    ANIONGAP 9 01/23/2019     (H) 01/23/2019    BUN 33 (H) 01/23/2019    CR 1.41 (H) 01/23/2019    GFRESTIMATED 55 (L) 01/23/2019    GFRESTBLACK 64 01/23/2019    ARLENE 8.7 01/23/2019    BILITOTAL 0.4 01/23/2019    ALBUMIN 3.4 01/23/2019    ALKPHOS 81 01/23/2019    ALT 30 01/23/2019    AST 19 01/23/2019        INR RESULTS:  Lab Results   Component Value Date    INR 3.50 (H) 01/23/2019       Lab Results   Component Value Date    MAG 2.2 01/07/2019     Lab Results   Component Value Date    NTBNPI 1,962 " (H) 01/10/2019     Lab Results   Component Value Date    NTBNP 686 (H) 01/23/2019     Optimization echo today:  Speed optimization limited by relatively small LV at baseline     Cardiopulmonary Stress test 8/6/18  Walked 7 min 22 seconds. MVO2 12.5.  RER 1.1, VE/VCO2 slope: 48.24. /52 at rest, 105/59 exercise.    Heart catheterization 7/11/18 at OSH       Assessment and Plan:   Red is a 58 year old man s/p HM3 LVAD with frequent low flow alarms and PI events. He has no evidence of RV failure, appears euvolemic, and hypertensive. We'll stop his diuretics and add lisinopril 2.5 mg once daily. He'll return to clinic this Friday for a repeat BP check and labs. Return to clinic in 1 week as well.     35 minutes spent in direct care, >50% in counseling    CC  ALETHEA VOGT      Please do not hesitate to contact me if you have any questions/concerns.     Sincerely,     Meredith Garcia NP     Unknown if ever smoked

## 2023-05-07 ENCOUNTER — HEALTH MAINTENANCE LETTER (OUTPATIENT)
Age: 63
End: 2023-05-07

## 2023-05-10 ENCOUNTER — TELEPHONE (OUTPATIENT)
Dept: TRANSPLANT | Facility: CLINIC | Age: 63
End: 2023-05-10

## 2023-05-10 DIAGNOSIS — Z94.1 HEART REPLACED BY TRANSPLANT (H): ICD-10-CM

## 2023-05-10 NOTE — TELEPHONE ENCOUNTER
Pt called with instructions for tomorrow's visit.    NPO after midnight.  Time Everolimus fo 12 hour trough.   needed after angiogram.  Pt states understanding all instructions.

## 2023-05-11 ENCOUNTER — HOSPITAL ENCOUNTER (OUTPATIENT)
Dept: GENERAL RADIOLOGY | Facility: CLINIC | Age: 63
Discharge: HOME | End: 2023-05-11
Attending: INTERNAL MEDICINE | Admitting: INTERNAL MEDICINE
Payer: COMMERCIAL

## 2023-05-11 ENCOUNTER — APPOINTMENT (OUTPATIENT)
Dept: MEDSURG UNIT | Facility: CLINIC | Age: 63
End: 2023-05-11
Attending: INTERNAL MEDICINE
Payer: COMMERCIAL

## 2023-05-11 ENCOUNTER — LAB (OUTPATIENT)
Dept: LAB | Facility: CLINIC | Age: 63
End: 2023-05-11
Attending: INTERNAL MEDICINE
Payer: COMMERCIAL

## 2023-05-11 ENCOUNTER — OFFICE VISIT (OUTPATIENT)
Dept: CARDIOLOGY | Facility: CLINIC | Age: 63
End: 2023-05-11
Attending: INTERNAL MEDICINE
Payer: COMMERCIAL

## 2023-05-11 ENCOUNTER — HOSPITAL ENCOUNTER (OUTPATIENT)
Facility: CLINIC | Age: 63
Discharge: HOME | End: 2023-05-11
Attending: INTERNAL MEDICINE | Admitting: INTERNAL MEDICINE
Payer: COMMERCIAL

## 2023-05-11 ENCOUNTER — HOSPITAL ENCOUNTER (OUTPATIENT)
Dept: CARDIOLOGY | Facility: CLINIC | Age: 63
Discharge: HOME | End: 2023-05-11
Attending: INTERNAL MEDICINE | Admitting: INTERNAL MEDICINE
Payer: COMMERCIAL

## 2023-05-11 VITALS
WEIGHT: 193 LBS | BODY MASS INDEX: 32.95 KG/M2 | HEIGHT: 64 IN | OXYGEN SATURATION: 95 % | HEART RATE: 117 BPM | SYSTOLIC BLOOD PRESSURE: 110 MMHG | DIASTOLIC BLOOD PRESSURE: 80 MMHG

## 2023-05-11 VITALS
SYSTOLIC BLOOD PRESSURE: 113 MMHG | HEART RATE: 108 BPM | HEIGHT: 64 IN | DIASTOLIC BLOOD PRESSURE: 79 MMHG | TEMPERATURE: 97.8 F | OXYGEN SATURATION: 96 % | RESPIRATION RATE: 28 BRPM | BODY MASS INDEX: 32.95 KG/M2 | WEIGHT: 193 LBS

## 2023-05-11 DIAGNOSIS — I10 BENIGN ESSENTIAL HYPERTENSION: ICD-10-CM

## 2023-05-11 DIAGNOSIS — Z94.1 HEART REPLACED BY TRANSPLANT (H): ICD-10-CM

## 2023-05-11 DIAGNOSIS — Z13.220 LIPID SCREENING: ICD-10-CM

## 2023-05-11 DIAGNOSIS — Z12.5 PROSTATE CANCER SCREENING: Primary | ICD-10-CM

## 2023-05-11 DIAGNOSIS — Z12.5 PROSTATE CANCER SCREENING: ICD-10-CM

## 2023-05-11 DIAGNOSIS — E78.2 MIXED HYPERLIPIDEMIA: ICD-10-CM

## 2023-05-11 DIAGNOSIS — E11.9 DIABETES MELLITUS, TYPE 2 (H): ICD-10-CM

## 2023-05-11 PROBLEM — Z98.890 STATUS POST CORONARY ANGIOGRAM: Status: ACTIVE | Noted: 2021-06-23

## 2023-05-11 LAB
ALBUMIN SERPL BCG-MCNC: 4.6 G/DL (ref 3.5–5.2)
ALP SERPL-CCNC: 67 U/L (ref 40–129)
ALT SERPL W P-5'-P-CCNC: 19 U/L (ref 10–50)
ANION GAP SERPL CALCULATED.3IONS-SCNC: 14 MMOL/L (ref 7–15)
AST SERPL W P-5'-P-CCNC: 23 U/L (ref 10–50)
BASOPHILS # BLD AUTO: 0.1 10E3/UL (ref 0–0.2)
BASOPHILS NFR BLD AUTO: 1 %
BILIRUB SERPL-MCNC: 0.7 MG/DL
BUN SERPL-MCNC: 34.3 MG/DL (ref 8–23)
CALCIUM SERPL-MCNC: 9.6 MG/DL (ref 8.8–10.2)
CHLORIDE SERPL-SCNC: 103 MMOL/L (ref 98–107)
CHOLEST SERPL-MCNC: 153 MG/DL
CK SERPL-CCNC: 181 U/L (ref 39–308)
CREAT SERPL-MCNC: 1.75 MG/DL (ref 0.67–1.17)
DEPRECATED HCO3 PLAS-SCNC: 22 MMOL/L (ref 22–29)
EOSINOPHIL # BLD AUTO: 0.2 10E3/UL (ref 0–0.7)
EOSINOPHIL NFR BLD AUTO: 2 %
ERYTHROCYTE [DISTWIDTH] IN BLOOD BY AUTOMATED COUNT: 13.5 % (ref 10–15)
GFR SERPL CREATININE-BSD FRML MDRD: 44 ML/MIN/1.73M2
GLUCOSE BLDC GLUCOMTR-MCNC: 139 MG/DL (ref 70–99)
GLUCOSE SERPL-MCNC: 147 MG/DL (ref 70–99)
HBA1C MFR BLD: 7.4 %
HCT VFR BLD AUTO: 46.9 % (ref 40–53)
HDLC SERPL-MCNC: 36 MG/DL
HGB BLD-MCNC: 13.3 G/DL (ref 13.3–17.7)
HGB BLD-MCNC: 15.2 G/DL (ref 13.3–17.7)
IMM GRANULOCYTES # BLD: 0 10E3/UL
IMM GRANULOCYTES NFR BLD: 0 %
LDLC SERPL CALC-MCNC: 61 MG/DL
LVEF ECHO: NORMAL
LYMPHOCYTES # BLD AUTO: 0.8 10E3/UL (ref 0.8–5.3)
LYMPHOCYTES NFR BLD AUTO: 11 %
MAGNESIUM SERPL-MCNC: 2 MG/DL (ref 1.7–2.3)
MCH RBC QN AUTO: 26.7 PG (ref 26.5–33)
MCHC RBC AUTO-ENTMCNC: 32.4 G/DL (ref 31.5–36.5)
MCV RBC AUTO: 82 FL (ref 78–100)
MONOCYTES # BLD AUTO: 0.7 10E3/UL (ref 0–1.3)
MONOCYTES NFR BLD AUTO: 9 %
NEUTROPHILS # BLD AUTO: 5.9 10E3/UL (ref 1.6–8.3)
NEUTROPHILS NFR BLD AUTO: 77 %
NONHDLC SERPL-MCNC: 117 MG/DL
NRBC # BLD AUTO: 0 10E3/UL
NRBC BLD AUTO-RTO: 0 /100
OXYHGB MFR BLDV: 68 % (ref 92–100)
PHOSPHATE SERPL-MCNC: 3.5 MG/DL (ref 2.5–4.5)
PLATELET # BLD AUTO: 263 10E3/UL (ref 150–450)
POTASSIUM SERPL-SCNC: 3.8 MMOL/L (ref 3.4–5.3)
PROT SERPL-MCNC: 7.5 G/DL (ref 6.4–8.3)
PSA SERPL DL<=0.01 NG/ML-MCNC: 9.44 NG/ML (ref 0–4.5)
RBC # BLD AUTO: 5.7 10E6/UL (ref 4.4–5.9)
SODIUM SERPL-SCNC: 139 MMOL/L (ref 136–145)
TRIGL SERPL-MCNC: 280 MG/DL
WBC # BLD AUTO: 7.7 10E3/UL (ref 4–11)

## 2023-05-11 PROCEDURE — 88350 IMFLUOR EA ADDL 1ANTB STN PX: CPT | Mod: TC | Performed by: INTERNAL MEDICINE

## 2023-05-11 PROCEDURE — 85025 COMPLETE CBC W/AUTO DIFF WBC: CPT | Performed by: INTERNAL MEDICINE

## 2023-05-11 PROCEDURE — 99153 MOD SED SAME PHYS/QHP EA: CPT | Performed by: INTERNAL MEDICINE

## 2023-05-11 PROCEDURE — 93308 TTE F-UP OR LMTD: CPT | Mod: 26 | Performed by: INTERNAL MEDICINE

## 2023-05-11 PROCEDURE — 36415 COLL VENOUS BLD VENIPUNCTURE: CPT | Performed by: INTERNAL MEDICINE

## 2023-05-11 PROCEDURE — 86352 CELL FUNCTION ASSAY W/STIM: CPT | Performed by: INTERNAL MEDICINE

## 2023-05-11 PROCEDURE — G0103 PSA SCREENING: HCPCS | Performed by: INTERNAL MEDICINE

## 2023-05-11 PROCEDURE — 250N000011 HC RX IP 250 OP 636: Performed by: INTERNAL MEDICINE

## 2023-05-11 PROCEDURE — 83036 HEMOGLOBIN GLYCOSYLATED A1C: CPT | Performed by: INTERNAL MEDICINE

## 2023-05-11 PROCEDURE — 255N000002 HC RX 255 OP 636: Performed by: INTERNAL MEDICINE

## 2023-05-11 PROCEDURE — 999N000134 HC STATISTIC PP CARE STAGE 3

## 2023-05-11 PROCEDURE — 83735 ASSAY OF MAGNESIUM: CPT | Performed by: INTERNAL MEDICINE

## 2023-05-11 PROCEDURE — 93321 DOPPLER ECHO F-UP/LMTD STD: CPT

## 2023-05-11 PROCEDURE — 86832 HLA CLASS I HIGH DEFIN QUAL: CPT | Performed by: INTERNAL MEDICINE

## 2023-05-11 PROCEDURE — 87799 DETECT AGENT NOS DNA QUANT: CPT | Performed by: INTERNAL MEDICINE

## 2023-05-11 PROCEDURE — 272N000001 HC OR GENERAL SUPPLY STERILE: Performed by: INTERNAL MEDICINE

## 2023-05-11 PROCEDURE — 80053 COMPREHEN METABOLIC PANEL: CPT | Performed by: INTERNAL MEDICINE

## 2023-05-11 PROCEDURE — 99215 OFFICE O/P EST HI 40 MIN: CPT | Mod: 25 | Performed by: INTERNAL MEDICINE

## 2023-05-11 PROCEDURE — 999N000054 HC STATISTIC EKG NON-CHARGEABLE

## 2023-05-11 PROCEDURE — 93454 CORONARY ARTERY ANGIO S&I: CPT | Performed by: INTERNAL MEDICINE

## 2023-05-11 PROCEDURE — 80061 LIPID PANEL: CPT | Performed by: INTERNAL MEDICINE

## 2023-05-11 PROCEDURE — 93325 DOPPLER ECHO COLOR FLOW MAPG: CPT | Mod: 26 | Performed by: INTERNAL MEDICINE

## 2023-05-11 PROCEDURE — 99152 MOD SED SAME PHYS/QHP 5/>YRS: CPT | Performed by: INTERNAL MEDICINE

## 2023-05-11 PROCEDURE — 999N000142 HC STATISTIC PROCEDURE PREP ONLY

## 2023-05-11 PROCEDURE — 71046 X-RAY EXAM CHEST 2 VIEWS: CPT | Mod: 26 | Performed by: RADIOLOGY

## 2023-05-11 PROCEDURE — 85018 HEMOGLOBIN: CPT | Mod: 91

## 2023-05-11 PROCEDURE — G0463 HOSPITAL OUTPT CLINIC VISIT: HCPCS | Mod: 25 | Performed by: INTERNAL MEDICINE

## 2023-05-11 PROCEDURE — 86833 HLA CLASS II HIGH DEFIN QUAL: CPT | Performed by: INTERNAL MEDICINE

## 2023-05-11 PROCEDURE — 88307 TISSUE EXAM BY PATHOLOGIST: CPT | Mod: TC | Performed by: INTERNAL MEDICINE

## 2023-05-11 PROCEDURE — 71046 X-RAY EXAM CHEST 2 VIEWS: CPT

## 2023-05-11 PROCEDURE — 93454 CORONARY ARTERY ANGIO S&I: CPT | Mod: 26 | Performed by: INTERNAL MEDICINE

## 2023-05-11 PROCEDURE — 88346 IMFLUOR 1ST 1ANTB STAIN PX: CPT | Mod: 26 | Performed by: PATHOLOGY

## 2023-05-11 PROCEDURE — 80169 DRUG ASSAY EVEROLIMUS: CPT | Performed by: INTERNAL MEDICINE

## 2023-05-11 PROCEDURE — 93456 R HRT CORONARY ARTERY ANGIO: CPT | Performed by: INTERNAL MEDICINE

## 2023-05-11 PROCEDURE — 250N000009 HC RX 250: Performed by: INTERNAL MEDICINE

## 2023-05-11 PROCEDURE — C1751 CATH, INF, PER/CENT/MIDLINE: HCPCS | Performed by: INTERNAL MEDICINE

## 2023-05-11 PROCEDURE — 258N000003 HC RX IP 258 OP 636: Performed by: INTERNAL MEDICINE

## 2023-05-11 PROCEDURE — 93005 ELECTROCARDIOGRAM TRACING: CPT

## 2023-05-11 PROCEDURE — 93505 ENDOMYOCARDIAL BIOPSY: CPT | Mod: 26 | Performed by: INTERNAL MEDICINE

## 2023-05-11 PROCEDURE — 82810 BLOOD GASES O2 SAT ONLY: CPT

## 2023-05-11 PROCEDURE — 93505 ENDOMYOCARDIAL BIOPSY: CPT | Performed by: INTERNAL MEDICINE

## 2023-05-11 PROCEDURE — 99214 OFFICE O/P EST MOD 30 MIN: CPT | Mod: 25 | Performed by: INTERNAL MEDICINE

## 2023-05-11 PROCEDURE — 80051 ELECTROLYTE PANEL: CPT | Performed by: INTERNAL MEDICINE

## 2023-05-11 PROCEDURE — 88350 IMFLUOR EA ADDL 1ANTB STN PX: CPT | Mod: 26 | Performed by: PATHOLOGY

## 2023-05-11 PROCEDURE — 99152 MOD SED SAME PHYS/QHP 5/>YRS: CPT | Mod: GC | Performed by: INTERNAL MEDICINE

## 2023-05-11 PROCEDURE — C1887 CATHETER, GUIDING: HCPCS | Performed by: INTERNAL MEDICINE

## 2023-05-11 PROCEDURE — 999N000208 ECHOCARDIOGRAM LIMITED

## 2023-05-11 PROCEDURE — 84100 ASSAY OF PHOSPHORUS: CPT | Performed by: INTERNAL MEDICINE

## 2023-05-11 PROCEDURE — 250N000013 HC RX MED GY IP 250 OP 250 PS 637: Performed by: INTERNAL MEDICINE

## 2023-05-11 PROCEDURE — 82550 ASSAY OF CK (CPK): CPT | Performed by: INTERNAL MEDICINE

## 2023-05-11 PROCEDURE — 88307 TISSUE EXAM BY PATHOLOGIST: CPT | Mod: 26 | Performed by: PATHOLOGY

## 2023-05-11 PROCEDURE — 93321 DOPPLER ECHO F-UP/LMTD STD: CPT | Mod: 26 | Performed by: INTERNAL MEDICINE

## 2023-05-11 PROCEDURE — 82962 GLUCOSE BLOOD TEST: CPT

## 2023-05-11 PROCEDURE — C1894 INTRO/SHEATH, NON-LASER: HCPCS | Performed by: INTERNAL MEDICINE

## 2023-05-11 RX ORDER — ASPIRIN 81 MG/1
243 TABLET, CHEWABLE ORAL ONCE
Status: COMPLETED | OUTPATIENT
Start: 2023-05-11 | End: 2023-05-11

## 2023-05-11 RX ORDER — OXYCODONE HYDROCHLORIDE 10 MG/1
10 TABLET ORAL EVERY 4 HOURS PRN
Status: DISCONTINUED | OUTPATIENT
Start: 2023-05-11 | End: 2023-05-11 | Stop reason: HOSPADM

## 2023-05-11 RX ORDER — AMLODIPINE BESYLATE 10 MG/1
10 TABLET ORAL DAILY
Qty: 90 TABLET | Refills: 3 | Status: SHIPPED | OUTPATIENT
Start: 2023-05-11

## 2023-05-11 RX ORDER — SODIUM CHLORIDE 9 MG/ML
INJECTION, SOLUTION INTRAVENOUS CONTINUOUS
Status: DISCONTINUED | OUTPATIENT
Start: 2023-05-11 | End: 2023-05-11 | Stop reason: HOSPADM

## 2023-05-11 RX ORDER — FLUMAZENIL 0.1 MG/ML
0.2 INJECTION, SOLUTION INTRAVENOUS
Status: DISCONTINUED | OUTPATIENT
Start: 2023-05-11 | End: 2023-05-11 | Stop reason: HOSPADM

## 2023-05-11 RX ORDER — NITROGLYCERIN 5 MG/ML
VIAL (ML) INTRAVENOUS
Status: DISCONTINUED | OUTPATIENT
Start: 2023-05-11 | End: 2023-05-11 | Stop reason: HOSPADM

## 2023-05-11 RX ORDER — ATROPINE SULFATE 0.1 MG/ML
0.5 INJECTION INTRAVENOUS
Status: DISCONTINUED | OUTPATIENT
Start: 2023-05-11 | End: 2023-05-11 | Stop reason: HOSPADM

## 2023-05-11 RX ORDER — ASPIRIN 325 MG
325 TABLET ORAL ONCE
Status: COMPLETED | OUTPATIENT
Start: 2023-05-11 | End: 2023-05-11

## 2023-05-11 RX ORDER — LIDOCAINE 40 MG/G
CREAM TOPICAL
Status: DISCONTINUED | OUTPATIENT
Start: 2023-05-11 | End: 2023-05-11 | Stop reason: HOSPADM

## 2023-05-11 RX ORDER — POTASSIUM CHLORIDE 750 MG/1
20 TABLET, EXTENDED RELEASE ORAL
Status: COMPLETED | OUTPATIENT
Start: 2023-05-11 | End: 2023-05-11

## 2023-05-11 RX ORDER — FENTANYL CITRATE 50 UG/ML
25 INJECTION, SOLUTION INTRAMUSCULAR; INTRAVENOUS
Status: DISCONTINUED | OUTPATIENT
Start: 2023-05-11 | End: 2023-05-11 | Stop reason: HOSPADM

## 2023-05-11 RX ORDER — NALOXONE HYDROCHLORIDE 0.4 MG/ML
0.2 INJECTION, SOLUTION INTRAMUSCULAR; INTRAVENOUS; SUBCUTANEOUS
Status: DISCONTINUED | OUTPATIENT
Start: 2023-05-11 | End: 2023-05-11 | Stop reason: HOSPADM

## 2023-05-11 RX ORDER — POTASSIUM CHLORIDE 750 MG/1
40 TABLET, EXTENDED RELEASE ORAL
Status: DISCONTINUED | OUTPATIENT
Start: 2023-05-11 | End: 2023-05-11 | Stop reason: HOSPADM

## 2023-05-11 RX ORDER — IOPAMIDOL 755 MG/ML
INJECTION, SOLUTION INTRAVASCULAR
Status: DISCONTINUED | OUTPATIENT
Start: 2023-05-11 | End: 2023-05-11 | Stop reason: HOSPADM

## 2023-05-11 RX ORDER — NALOXONE HYDROCHLORIDE 0.4 MG/ML
0.4 INJECTION, SOLUTION INTRAMUSCULAR; INTRAVENOUS; SUBCUTANEOUS
Status: DISCONTINUED | OUTPATIENT
Start: 2023-05-11 | End: 2023-05-11 | Stop reason: HOSPADM

## 2023-05-11 RX ORDER — OXYCODONE HYDROCHLORIDE 5 MG/1
5 TABLET ORAL EVERY 4 HOURS PRN
Status: DISCONTINUED | OUTPATIENT
Start: 2023-05-11 | End: 2023-05-11 | Stop reason: HOSPADM

## 2023-05-11 RX ORDER — HEPARIN SODIUM 1000 [USP'U]/ML
INJECTION, SOLUTION INTRAVENOUS; SUBCUTANEOUS
Status: DISCONTINUED | OUTPATIENT
Start: 2023-05-11 | End: 2023-05-11 | Stop reason: HOSPADM

## 2023-05-11 RX ORDER — FENTANYL CITRATE 50 UG/ML
INJECTION, SOLUTION INTRAMUSCULAR; INTRAVENOUS
Status: DISCONTINUED | OUTPATIENT
Start: 2023-05-11 | End: 2023-05-11 | Stop reason: HOSPADM

## 2023-05-11 RX ORDER — NICARDIPINE HYDROCHLORIDE 2.5 MG/ML
INJECTION INTRAVENOUS
Status: DISCONTINUED | OUTPATIENT
Start: 2023-05-11 | End: 2023-05-11 | Stop reason: HOSPADM

## 2023-05-11 RX ADMIN — SODIUM CHLORIDE: 9 INJECTION, SOLUTION INTRAVENOUS at 10:05

## 2023-05-11 RX ADMIN — POTASSIUM CHLORIDE 20 MEQ: 750 TABLET, EXTENDED RELEASE ORAL at 10:38

## 2023-05-11 RX ADMIN — LIDOCAINE: 40 CREAM TOPICAL at 10:10

## 2023-05-11 RX ADMIN — HUMAN ALBUMIN MICROSPHERES AND PERFLUTREN 5 ML: 10; .22 INJECTION, SOLUTION INTRAVENOUS at 08:45

## 2023-05-11 RX ADMIN — ASPIRIN 81 MG CHEWABLE TABLET 243 MG: 81 TABLET CHEWABLE at 10:04

## 2023-05-11 ASSESSMENT — ACTIVITIES OF DAILY LIVING (ADL)
ADLS_ACUITY_SCORE: 35

## 2023-05-11 ASSESSMENT — PAIN SCALES - GENERAL: PAINLEVEL: NO PAIN (0)

## 2023-05-11 NOTE — PROGRESS NOTES
D/I/A: Pt roomed on 3C in bay R.  Arrived via litter and accompanied by RN off ST monitor.  VSSA.  Rhythm upon arrival ST on monitor.  Denies pain or sob.  Reviewed activity restrictions and when to notify RN, ie-changes to breathing or increased chest pressure or chest pain.  CCL access:  R internal jugular site drsg cdi/no bleeding or hematoma/sheath was pulled in CCL.  R radial access has TR band with 13 ml of air.    P: Continue to monitor status.  Discharge to home once meeting criteria.

## 2023-05-11 NOTE — NURSING NOTE
Transplant Coordinator Note    Reason for visit: 4th annual  Coordinator: Present   Caregiver:  none today    Health concerns addressed today:  1. Had a bleed in eye- seeing optho in [place].  Has improved.  2. Hgb A1 C has improved- 7.4  3.       Immunosuppressants:  Evero 1.5mg BID  MMF 1000 BID    Routine screenings:    Derm: UTD  Dental: DUE  Colonoscopy: 2022- due 2025  Breast/Prostate: PSA pending follows with urology  Eye: seeing eye doctor regularly- had eye hemorage  Flu/Pneumonia: Due for Covid, pneumonia done, needs Shingrix, Flu??    Labs:   Creat 1.7  Hgb A1C 7.3    Additional Notes:         Meds, labs, testing reviewed with patient  Medication record reviewed and reconciled  Questions and concerns addressed  Pt verbalized an understanding of plan of care.     Patient Instructions  1. Angelika call with remaining results.  2. Make a dentist appointment.  3. Make an appointment to see Urology.  Next transplant clinic appointment:  In one year for 4th annual  Next lab draw:   Coordinator will call with all pending results.     Please call transplant coordinator with any questions:    Angelika Muller RN BSN   Post Heart Transplant Nurse Coordinator  [facility]  Questions: [phone]

## 2023-05-11 NOTE — NURSING NOTE
Chief Complaint   Patient presents with     Follow Up     Return heart transplant           Vitals were taken and medications reconciled.     Dayo Pizarro, EMT   4:02 PM

## 2023-05-11 NOTE — PATIENT INSTRUCTIONS
Patient Instructions  1. Angelika call with remaining results.  2. Make a dentist appointment.  3. Make an appointment to see Urology.  Next transplant clinic appointment:  In one year for 4th annual  Next lab draw:   Coordinator will call with all pending results.     Please call transplant coordinator with any questions:    Angelika Muller RN BSN   Post Heart Transplant Nurse Coordinator  [facility]  Questions: [phone]

## 2023-05-11 NOTE — H&P
"[facility]    Cardiology History and Physical - Cardiology     Date of Admission:  5/11/2023    Assessment & Plan: HVSL    Mariusz Sharp is a 62 year old male admitted on 5/11/2023. He is s/p OHT 5/18/20. He presented tot he cath lab for RHC and coronary angiogram as part of routine transplant surveillance.      Diet: NPO for Medical/Clinical Reasons Except for: Meds    DVT Prophylaxis: Pneumatic Compression Devices  Perez Catheter: Not present  Cardiac Monitoring: None  Code Status:   Full                    # Hypertension: home medication list includes antihypertensive(s)     # DMII: A1C = 7.4 % (Ref range: <5.7 %) within past 6 months    # Obesity: Estimated body mass index is 33.13 kg/m  as calculated from the following:    Height as of this encounter: 1.626 m (5' 4\").    Weight as of this encounter: 87.5 kg (193 lb).          Disposition Plan  Discharge after procedure.      Khalif Arauz MD  Cardiology fellow    ______________________________________________________________________    Chief Complaint   Here for procedure.    History of Present Illness   Mariusz Sharp is a 62 year old male admitted on 5/11/2023. He is s/p OHT 5/18/20. He presented tot he cath lab for RHC and coronary angiogram as part of routine transplant surveillance.     Past Medical History    Past Medical History:   Diagnosis Date     Acute kidney injury (H)      CKD (chronic kidney disease)      Coronary artery disease      Diabetes mellitus (H)      HTN (hypertension)      Hyperlipidemia      ICD (implantable cardioverter-defibrillator), single chamber- NOT dependent 7/17/2018     Ischemic cardiomyopathy      LV (left ventricular) mural thrombus      Paroxysmal atrial flutter (H)      RVF (right ventricular failure) (H)      Systolic heart failure (H)      Ventricular tachycardia (H)        Past Surgical History   Past Surgical History:   Procedure Laterality Date     " BRONCHOSCOPY (RIGID OR FLEXIBLE), DIAGNOSTIC N/A 9/15/2020    Procedure: BRONCHOSCOPY, WITH BRONCHOALVEOLAR LAVAGE;  Surgeon: Elder Mejia MD;  Location:  GI     BRONCHOSCOPY (RIGID OR FLEXIBLE), DIAGNOSTIC N/A 9/17/2020    Procedure: BRONCHOSCOPY, WITH BRONCHOALVEOLAR LAVAGE;  Surgeon: Bill Lemus MD;  [facility]     BRONCHOSCOPY RIGID OR FLEXIBLE W/TRANSENDOSCOPIC ENDOBRONCHIAL ULTRASOUND GUIDED Right 9/8/2020    Procedure: BRONCHOSCOPY, WITH ENDOBRONCHIAL ULTRASOUND;  Surgeon: Donny Mercedes MD;  [facility]     COLONOSCOPY N/A 12/7/2018    Procedure: COLONOSCOPY;  Surgeon: Krish Mercado MD;  [facility]     CV CORONARY ANGIOGRAM N/A 6/23/2021    Procedure: CV CORONARY ANGIOGRAM;  Surgeon: Brian Long MD;  Location:  HEART CARDIAC CATH LAB     CV CORONARY ANGIOGRAM N/A 5/18/2022    Procedure: Coronary Angiogram [[id number]];  Surgeon: Khris Mcclelland MD;  Location:  HEART CARDIAC CATH LAB     CV HEART BIOPSY N/A 5/24/2020    Procedure: Heart Biopsy;  Surgeon: Kit Bacon MD;  Location:  HEART CARDIAC CATH LAB     CV HEART BIOPSY N/A 6/1/2020    Procedure: CV HEART BIOPSY;  Surgeon: Kit Bacon MD;  Location:  HEART CARDIAC CATH LAB     CV HEART BIOPSY N/A 6/8/2020    Procedure: CV HEART BIOPSY;  Surgeon: Kit Bacon MD;  Location:  HEART CARDIAC CATH LAB     CV HEART BIOPSY N/A 6/15/2020    Procedure: CV HEART BIOPSY;  Surgeon: Kit Bacon MD;  Location:  HEART CARDIAC CATH LAB     CV HEART BIOPSY N/A 6/30/2020    Procedure: CV HEART BIOPSY;  Surgeon: Kit Bacon MD;  Location:  HEART CARDIAC CATH LAB     CV HEART BIOPSY N/A 7/14/2020    Procedure: CV HEART BIOPSY;  Surgeon: Brian Long MD;  Location:  HEART CARDIAC CATH LAB     CV HEART BIOPSY N/A 7/29/2020    Procedure: CV HEART BIOPSY;  Surgeon: Momo Hunt MD;  Location: [facility] CARDIAC  CATH LAB     CV HEART BIOPSY N/A 8/13/2020    Procedure: CV HEART BIOPSY;  Surgeon: Khris Mcclelland MD;  [facility]     CV HEART BIOPSY N/A 8/26/2020    Procedure: CV HEART BIOPSY;  Surgeon: Momo Hunt MD;  [facility]     CV HEART BIOPSY N/A 9/30/2020    Procedure: CV HEART BIOPSY;  Surgeon: Artemio Ulloa MD;  [facility]     CV HEART BIOPSY N/A 10/29/2020    Procedure: CV HEART BIOPSY;  Surgeon: Kit Bacon MD;  [facility]     CV HEART BIOPSY N/A 1/5/2021    Procedure: CV HEART BIOPSY;  Surgeon: Carlin Garcia MD;  [facility]     CV HEART BIOPSY N/A 6/23/2021    Procedure: CV HEART BIOPSY;  Surgeon: Brian Long MD;  [facility]     CV HEART BIOPSY N/A 10/26/2021    Procedure: CV HEART BIOPSY;  Surgeon: Kit Bacon MD;  [facility]     CV HEART BIOPSY N/A 5/18/2022    Procedure: Heart Biopsy;  Surgeon: Khris Mcclelland MD;  [facility]     CV RIGHT HEART CATH MEASUREMENTS RECORDED N/A 2/19/2019    Procedure: RHC;  Surgeon: Brian Long MD;  [facility]     CV RIGHT HEART CATH MEASUREMENTS RECORDED N/A 7/5/2019    Procedure: CV RIGHT HEART CATH;  Surgeon: Khris Mcclelland MD;  [facility]     CV RIGHT HEART CATH MEASUREMENTS RECORDED N/A 5/24/2020    Procedure: Right Heart Cath;  Surgeon: Kit Bacon MD;  [facility]     CV RIGHT HEART CATH MEASUREMENTS RECORDED N/A 6/1/2020    Procedure: CV RIGHT HEART CATH;  Surgeon: Kit Bacon MD;  [facility]     CV RIGHT HEART CATH MEASUREMENTS RECORDED N/A 6/8/2020    Procedure: CV RIGHT HEART CATH;  Surgeon: Kit Bacon MD;  Location: [facility]  CARDIAC CATH LAB     CV RIGHT HEART CATH MEASUREMENTS RECORDED N/A 6/15/2020    Procedure: CV RIGHT HEART CATH;  Surgeon: Kit Bacon MD;  Location:  HEART CARDIAC CATH LAB     CV RIGHT HEART CATH MEASUREMENTS RECORDED N/A 6/30/2020    Procedure: CV RIGHT HEART CATH;  Surgeon: Kit Bacon MD;  Location:  HEART CARDIAC CATH LAB     CV RIGHT HEART CATH MEASUREMENTS RECORDED N/A 7/14/2020    Procedure: CV RIGHT HEART CATH;  Surgeon: Brian Long MD;  Location:  HEART CARDIAC CATH LAB     CV RIGHT HEART CATH MEASUREMENTS RECORDED N/A 7/29/2020    Procedure: CV RIGHT HEART CATH;  Surgeon: Momo Hunt MD;  Location:  HEART CARDIAC CATH LAB     CV RIGHT HEART CATH MEASUREMENTS RECORDED N/A 8/13/2020    Procedure: CV RIGHT HEART CATH;  Surgeon: Khris Mcclelland MD;  Location:  HEART CARDIAC CATH LAB     CV RIGHT HEART CATH MEASUREMENTS RECORDED N/A 8/26/2020    Procedure: CV RIGHT HEART CATH;  Surgeon: Momo Hunt MD;  Location:  HEART CARDIAC CATH LAB     CV RIGHT HEART CATH MEASUREMENTS RECORDED N/A 9/30/2020    Procedure: Right Heart Cath;  Surgeon: Artemio Ulloa MD;  Location:  HEART CARDIAC CATH LAB     CV RIGHT HEART CATH MEASUREMENTS RECORDED N/A 10/29/2020    Procedure: CV RIGHT HEART CATH;  Surgeon: Kit Bacon MD;  Location:  HEART CARDIAC CATH LAB     CV RIGHT HEART CATH MEASUREMENTS RECORDED N/A 1/5/2021    Procedure: CV RIGHT HEART CATH;  Surgeon: Carlin Garcia MD;  Location:  HEART CARDIAC CATH LAB     CV RIGHT HEART CATH MEASUREMENTS RECORDED N/A 6/23/2021    Procedure: CV RIGHT HEART CATH;  Surgeon: Brian Long MD;  Location:  HEART CARDIAC CATH LAB     CV RIGHT HEART CATH MEASUREMENTS RECORDED N/A 10/26/2021    Procedure: CV RIGHT HEART CATH;  Surgeon: Kit Bacon MD;  Location:  HEART CARDIAC CATH LAB     CV RIGHT HEART CATH MEASUREMENTS RECORDED N/A 5/18/2022     Procedure: Right Heart Catheterization;  Surgeon: Khris Mcclelland MD;  Location:  HEART CARDIAC CATH LAB     ENDOBRONCHIAL ULTRASOUND FLEXIBLE N/A 2020    Procedure: BRONCHOSCOPY, flexible, endobronchial ultrasound with transbronchial biopsies, endobronchial biopsies;  Surgeon: Bill Lemus MD;  [facility]      PRQ [id number] CORONARY ANGIOPLASTY ADDL BRANCH      PCI and ALYSSA to ostial LAD on 18     IMPLANT AUTOMATIC IMPLANTABLE CARDIOVERTER DEFIBRILLATOR       INSERT VENTRICULAR ASSIST DEVICE LEFT (HEARTMATE II) N/A 2018    Procedure: Median Sternotomy, On Cardiopulmonary Bypass Pump, Insertion of Left Ventricular Assist Device (Heartmate III);  Surgeon: Kamaljit Baker MD;  [facility]     PICC DOUBLE LUMEN PLACEMENT Right 2020    5FR DL PICC inserted at right basilic vein, length 38cm.     TRANSPLANT HEART RECIPIENT AFTER HEART MATE N/A 2020    Procedure: REDO MEDIAN STERNOTOMY, LYSIS OF ADHESIONS, ON CARDIOPULMONARY BYPASS PUMP, HEART TRANSPLANT RECIPIENT, REMOVAL OF LEFT VENTRICULAR ASSIST DEVICE, REMOVAL OF AUTOMATED IMPLANTABLE CARDIOVERTER DEFIBRILLATOR;  Surgeon: Elder Willis MD;  [facility]       Prior to Admission Medications   Prior to Admission Medications   Prescriptions Last Dose Informant Patient Reported? Taking?   ACCU-CHEK [place] PLUS test strip   No No   Sig: Check BG 3 times daily at meals and at bedtime.   BD SILVANA U/F 32G X 4 MM insulin pen needle   No No   Sig: Use 6 times daily.   Continuous Blood Gluc Sensor (FREESTYLE DARRELL 2 SENSOR) [facility]   No No   Si each every 14 days Use 1 sensor every 14 days. Use to read blood sugars per 's instructions.   HUMALOG KWIKPEN 100 UNIT/ML soln 2023 at 0400  No Yes   Sig: INJECT ONE UNIT PER EVERY 5 GRAMS OF CARBS WITH MEALS AND SNACKS, PLUS CORRECTION, APPROX 70 UNITS IN 24 HOURS   acetaminophen (TYLENOL) 325 MG tablet Unknown Self No Yes   Sig: Take 2 tablets (650 mg) by mouth  every 4 hours as needed for other (multimodal surgical pain management along with NSAIDS and opioid medication as indicated based on pain control and physical function.)   amLODIPine (NORVASC) 10 MG tablet 5/10/2023 at 2200  No Yes   Sig: Take 1 tablet (10 mg) by mouth daily   aspirin (ASA) 81 MG EC tablet 5/11/2023 at 0900  No Yes   Sig: Take 1 tablet (81 mg) by mouth daily   calcium carbonate 600 mg-vitamin D 400 units (CALTRATE) 600-400 MG-UNIT per tablet 5/10/2023 at 1400 Self No Yes   Sig: Take 1 tablet by mouth 2 times daily (with meals)   dapagliflozin (FARXIGA) 10 MG TABS tablet 5/11/2023 at 0900  No Yes   Sig: Take 1 tablet (10 mg) by mouth daily   everolimus (ZORTRESS) 0.5 MG tablet 5/11/2023 at 0900  No Yes   Sig: Take 3 tablets (1.5 mg) by mouth 2 times daily   hydrALAZINE (APRESOLINE) 50 MG tablet 5/11/2023 at 0900  No Yes   Sig: Take 2.5 tablets (125 mg) by mouth 3 times daily   insulin glargine (LANTUS PEN) 100 UNIT/ML pen 5/10/2023 at 2100  No Yes   Sig: Inject 36 units subcutaneous at hs.   loperamide (IMODIUM) 2 MG capsule Unknown Self No Yes   Sig: Take 1 capsule (2 mg) by mouth 4 times daily as needed for diarrhea   magnesium oxide (MAG-OX) 400 (241.3 Mg) MG tablet 5/10/2023 at 1400 Self No Yes   Sig: Take 1 tablet (400 mg) by mouth daily   multivitamin w/minerals (THERA-VIT-M) tablet 5/10/2023 at 1400 Self No Yes   Sig: Take 1 tablet by mouth daily   mycophenolate (GENERIC EQUIVALENT) 250 MG capsule 5/11/2023 at 0900  No Yes   Sig: Take 4 capsules (1,000 mg) by mouth 2 times daily   pantoprazole (PROTONIX) 40 MG EC tablet 5/11/2023 at 0900  No Yes   Sig: Take 1 tablet (40 mg) by mouth every morning (before breakfast)   potassium chloride ER (KLOR-CON M) 20 MEQ CR tablet 5/10/2023 at 2200  No Yes   Sig: Take 1 tablet (20 mEq) by mouth daily   rosuvastatin (CRESTOR) 20 MG tablet 5/10/2023 at 1930  No Yes   Sig: Take 1 tablet (20 mg) by mouth daily   semaglutide (OZEMPIC, 0.25 OR 0.5 MG/DOSE,) 2  MG/3ML SOPN pen 5/4/2023 at 1200  No No   Sig: Inject 0.25 mg Subcutaneous once a week for 28 days, THEN 0.5 mg once a week for 28 days.   torsemide (DEMADEX) 20 MG tablet More than a month Self No Yes   Sig: Take 1 tablet (20 mg) by mouth daily      Facility-Administered Medications: None        Physical Exam   Vital Signs: Temp: 98.7  F (37.1  C) Temp src: Oral BP: (!) 121/98 Pulse: 120   Resp: 18        Weight: 193 lbs 0 oz    Constitutional: Awake, alert, cooperative.  HEENT: Normocephalic.   Neck: ROM normal.  Pulmonary: No increased work of breathing, CTAB.  Cardiovascular: RRR, normal S1 and S2.  Abdominal: Soft. ND.  Musculoskletal: Full range of motion noted.  No LE edema.  Skin: Warm.   Neurological: Awake, alert, oriented to name, place and time.     Psych: Appropriate mood and affect.    Data   Recent Labs   Lab 05/11/23  0802   WBC 7.7   HGB 15.2   MCV 82         POTASSIUM 3.8   CHLORIDE 103   CO2 22   BUN 34.3*   CR 1.75*   ANIONGAP 14   ARLENE 9.6   *   ALBUMIN 4.6   PROTTOTAL 7.5   BILITOTAL 0.7   ALKPHOS 67   ALT 19   AST 23       I Professor Mcclelland, saw and evaluated the patient as part of a shared APRN/PA visit. I have seen and examined the patient with the [facility] team and reviewed the pertinent laboratory results. I agree with the assessment and plan of the discharge summary note above. I spent 35 minutes face-to-face and/or coordinating care. Over 50% of the time on the unit was spent counseling the patient and/or coordinating care as documented above. Date of service 5/11/2023    Khris Mcclelland MD  Interventional Cardiology  Pager: [phone]

## 2023-05-11 NOTE — PROGRESS NOTES
D/I/A:  Patient is tolerating liquids and foods, ambulating, urinating, puncture sites are stable (no bleeding and no hematoma) and patient has a .  A+O x4 and making needs known.  CCL access sites C/D/I; no bleeding or hematoma; CMS intact. VSSA. ST on monitor. IV access removed. Education completed and outlined in AVS or handout: medications reviewed with patient.  Questions answered prior to discharge.  Belongings returned to patient at discharge.    P: Discharged to self care.  Patient to follow up with appts as per discharge instruction.

## 2023-05-11 NOTE — LETTER
"5/11/2023      RE: Mariusz Sharp  [address] [facility] [address]       Dear Colleague,    Thank you for the opportunity to participate in the care of your patient, Mariusz Sharp, at the [facility] [place] [facility]. Please see a copy of my visit note below.    May 11, 2023      \"Anju Sharp is a 62yr old male with a history of CAD (STEMI with ALYSSA to LAD 6/27/18), ICM (LVEF 20-25%, s/p HM3 LVAD 12/31/18) s/p OHT 5/18/20, monomorphic VT (s/p ICD with shocks), LV thrombus, CKD, elevated PSA, pAF, HTN, HL, and DMII who presents to clinic for routine follow up.   His post-transplant course was c/b NSVT (with no hemodynamic compromise), leukocytosis with C Acnes growing from his former LVAD site (thought to be a contaminant, but treated with IV vanco --> po doxy through 6/1/20, 14d course total), and in the setting of donor blood growing S anginosus and Veillonella (also thought to be a contaminant, but treated with Vanco/zosyn --> Vanco/Flagyl for a total of 7 days).  He also had hyperkalemia, which improved following kayexalate and stopping bactrim.  He ultimately discharged 5/29/20.     He was readmitted 8/31-9/24/20 with fever, cough, diarrhea, and syncope.  He was found to have a post-obstructive vs aspiration pneumonia, RUL/suprahilar mass, and narrowing of multiple RUL bronchi.  He was initially treated as a fungal infection as his fungitelle was +, but he did not clinically improve, nor did the size of the mass change.  Ultimately, tissue culture from the RUL mass showed abiotrophia defectiva (oral microbe), which was thought to be a contaminant, but did respond to antibiotics.  His MMF was decreased to 500mg twice daily     Rejection history:  none  AlloMap scores:  < 35  DSAs:  none  Coronary angio/Ischemic eval: Coronary angiogram 6/2021 showed normal coronary arteries with no angiographic evidence of obstruction.  Last RHC:  " 10/2021 showed normal biventricular filling pressures, with RA 4, mPA 20, PCW 14, and CI 2.7.  Echo/cMRI: TTE 2/2022 showed stable graft function, with LVEF 55-60% and normal RV size/function.     Overall he has been doing well and denies any new complaints.  He did just come from the Cath Lab and the procedure went well.  Denies any dizziness lightheadedness palpitations chest pain lightheadedness or syncope.  No worsening shortness of breath.  Doing well overall.     Serostatus:  CMV D+/R-  EBV D-/R+  Toxo D-/R-      PAST MEDICAL HISTORY:  Past Medical History:   Diagnosis Date    Acute kidney injury (H)     CKD (chronic kidney disease)     Coronary artery disease     Diabetes mellitus (H)     HTN (hypertension)     Hyperlipidemia     ICD (implantable cardioverter-defibrillator), single chamber- NOT dependent 7/17/2018    Ischemic cardiomyopathy     LV (left ventricular) mural thrombus     Paroxysmal atrial flutter (H)     RVF (right ventricular failure) (H)     Systolic heart failure (H)     Ventricular tachycardia (H)      FAMILY HISTORY:  No relevant history    SOCIAL HISTORY:  Social History     Socioeconomic History    Marital status: Single   Tobacco Use    Smoking status: Never    Smokeless tobacco: Never   Substance and Sexual Activity    Alcohol use: No    Drug use: No   Other Topics Concern    Parent/sibling w/ CABG, MI or angioplasty before 65F 55M? No     CURRENT MEDICATIONS:  No current facility-administered medications for this visit.     No current outpatient medications on file.     Facility-Administered Medications Ordered in Other Visits   Medication    lidocaine (LMX4) cream    lidocaine 1 % 0.1-1 mL    Patient is NOT allergic to contrast dye    potassium chloride ER (KLOR-CON M) CR tablet 40 mEq    sodium chloride (PF) 0.9% PF flush 3 mL    sodium chloride (PF) 0.9% PF flush 3 mL    sodium chloride (PF) 0.9% PF flush 3 mL    sodium chloride (PF) 0.9% PF flush 3 mL    sodium chloride 0.9%  "infusion     ROS:   Constitutional: No fever, chills, or sweats.   ENT: No visual disturbance, ear ache, epistaxis, sore throat.   Allergies/Immunologic: Negative.   Respiratory: No cough, hemoptysis.   Cardiovascular: As per HPI.   GI: No nausea, vomiting, hematemesis, melena, or hematochezia.   : No urinary frequency, dysuria, or hematuria.   Integument: Negative.   Psychiatric: Pleasant, no major depression noted  Neuro: No focal neurological deficits noted  Endocrinology: Negative.   Musculoskeletal: As per HPI.      EXAM:  /80 (BP Location: Left arm, Patient Position: Sitting, Cuff Size: Adult Large)   Pulse 117   Ht 1.623 m (5' 3.9\")   Wt 87.5 kg (193 lb)   SpO2 95%   BMI 33.23 kg/m    In general, the patient is a pleasant male in no apparent distress.    HEENT: NC/AT. PERRLA. EOMI.  Sclerae white, not injected.    Neck:  No adenopathy, No thyromegaly.    COR: JVD at clavicle when sitting upright.  RRR.  Normal S1 S2 splits physiologically.  No murmur, rub click, or gallop.    Lungs:  CTA. No rhonchi.    Abdomen: soft, nontender, nondistended.  No organomegaly.  Extremities:  No clubbing, cyanosis, or LE edema.    Neuro: Alert & Oriented x 3, grossly non focal.  Integument: no open lesions, rashes, or jaundice.     Labs:  Lab Results   Component Value Date    WBC 7.7 05/11/2023    HGB 15.2 05/11/2023    HCT 46.9 05/11/2023     05/11/2023     05/11/2023    POTASSIUM 3.8 05/11/2023    CHLORIDE 103 05/11/2023    CO2 22 05/11/2023    BUN 34.3 (H) 05/11/2023    CR 1.75 (H) 05/11/2023     (H) 05/11/2023    DD 2.0 (H) 06/04/2020    NTBNPI 1,673 (H) 08/18/2020    NTBNP 404 (H) 06/26/2019    TROPI <0.015 09/17/2020    AST 23 05/11/2023    ALT 19 05/11/2023    ALKPHOS 67 05/11/2023    BILITOTAL 0.7 05/11/2023    INR 1.06 08/26/2020     RHC 10/26/21:  RA: --/--/6  RV: 27/6  PA: 25/18/22  PCWP: --/--/17  PA Sat: 62.3%  Hb: 11  TD CO/CI: 5/2.62  AMRIT CO/CI: 5.15/2.7  PVR: 1.0 (AMRIT)  SVR " "1430      TTE 2/23/22:  Global and regional left ventricular function is normal with an EF of 55-60%.  Right ventricular function, chamber size, wall motion, and thickness are normal.  Pulmonary artery systolic pressure cannot be assessed. The inferior vena cava is normal.  No significant changes noted.      TTE 5/18/22:  Left ventricular function is decreased. The ejection fraction is 40-45% (mildly reduced).  Global right ventricular function is mildly reduced.  No significant valvular abnormalities.  No pericardial effusion is present.  This study was compared with the study from 2/23/22: While comparison with the prior study is challenging due to poor image quality on the previous images, LV and RV function appear to have decreased slightly.     Coronary angiogram/RHC 5/18/22:  NOrmal coroanries  RA 7  RV 31/8  PA 31/18/24  PCWP 17  Cardiac output by William: 7.20 L/min (indexed to 3.6 L/min/m2)  PVR 1.0      Assessment and plan:   \"Red\" Aron is a 62yr old male with a history of CAD (STEMI with ALYSSA to LAD 6/27/18), ICM (LVEF 20-25%, s/p HM3 LVAD 12/31/18) s/p OHT 5/18/20, monomorphic VT (s/p ICD with shocks), LV thrombus, CKD, elevated PSA, pAF, HTN, HL, and DMII who presents to clinic for routine follow up.     Transthoracic echocardiogram reviewed today, LVEF is probably reported around 40 to 45% however final read is pending.  On review of the contrast images I do believe his ejection fraction is a little bit better closer to like 5055%.  This is grossly unchanged from previous.  As such we will not make any adjustments at this time.  Overall continues to do well without any issues.  He is coming from the cardiac catheterization laboratory but filling pressures were essentially normal cardiac output was normal biopsy was done and pending.  Coronary angiogram was also performed and it is normal.  Today he is doing well without any issues.  At this time we will not change any medications we will wait for some " of the results to return.  He will return to his 4-year appointment as per schedule.  We encouraged him to make an appointment with a dentist given that he is overdue for this and also to see a urologist      [facility], s/p OHT 5/18/20  His post-transplant course was c/b NSVT (with no hemodynamic compromise), leukocytosis with C Acnes growing from his former LVAD site (thought to be a contaminant, but treated with IV vanco --> po doxy through 6/1/20, 14d course total), and in the setting of donor blood growing S anginosus and Veillonella (also thought to be a contaminant, but treated with Vanco/zosyn --> Vanco/Flagyl for a total of 7 days).  He also had hyperkalemia, which improved following kayexalate and stopping bactrim.      Rejection history:  none  AlloMap scores:  < 35  DSAs:  none  Coronary angio/Ischemic eval: Coronary angiogram 6/2021 showed normal coronary arteries with no angiographic evidence of obstruction.  Last RHC:  10/2021 showed normal biventricular filling pressures, with RA 4, mPA 20, PCW 14, and CI 2.7.  Echo/cMRI: TTE 2/2022 showed stable graft function, with LVEF 55-60% and normal RV size/function.     Serostatus:  - CMV D+/R-  - EBV D-/R+  - Toxo D-/R-      PPx:  - CAV:  Continue rosuvastatin 10mg daily.  - GI:  Pantoprazole 40mg daily  - Osteoporosis:  Calcium/vitamin d supplements     Graft function:  - BPs:  Controlled, continue amlodipine 5mg daily and hydralazine 100mg TID  - fluid status:  Euvolemic, will review torsemide dosing after RHC today -- currently taking prn, has taken 2 doses in the last 2 days     Health maintenance:  - derm exam overdue  - dental exam overdue  - eye exam UTD, has developing cataracts  - COVID shots   - pneumonia shots -- advised that he discuss with PCP  - following with urology     Post-obstructive versus aspiration pneumonia  RUL/suprahilar mass   +abiotrophia defectiva  He was readmitted 8/31/20-9/24/20 with fever, cough, diarrhea, and syncope.  He was found  to have a post-obstructive vs aspiration pneumonia, RUL/suprahilar mass, and narrowing of multiple RUL bronchi.  He was initially treated as a fungal infection as his fungitelle was +, but he did not clinically improve, nor did the size of the mass change.  Ultimately, tissue culture from the RUL mass showed abiotrophia defectiva (oral microbe), which was thought to be a contaminant, but did respond to antibiotics.  His MMF was decreased to 500mg twice daily, as ImmuKnow was noted to be low.  He completed 4 weeks of antibiotic therapy 10/11/20, and follow up chest CTs have shown improvement in the RUL infiltrate.     DMII  HgbA1c remains elevated, advised that he follow-up with endo.  He has started Farxiga.     Elevated PSA  Prostate MRI 5/2020 showed signs of BPH, he follows with urology.     Possible chronic prostatits  Treated with antibiotics for > 1 month between 8/2020-10/2020.     I appreciate the opportunity to participate in the care of Mariusz Sharp . Please do not hesitate to contact me with any further questions.     Sincerely,   Gerardo Stone MD  [facility] Division of Cardiology

## 2023-05-11 NOTE — PROGRESS NOTES
Pt is here for his yearly Coronary angiogram and RHC.  Pt is stable, AVSS and pain free.  PIV in and IVF at 150 cc/hr. LMX applied to RIJ.  Groin prepped and PP marked. Labs resulted and reviewed (K=3.8/replaced with 20KCL and mag=2.0, Creat=1.75).  Hx: DM2. Pt had his insulin at this am at 0400/SG=823 at 0802.    Brother, Romeo, will be available to transport pt post procedure. His cell # [phone]    H&P needs updating  Consent is signed.

## 2023-05-11 NOTE — DISCHARGE INSTRUCTIONS
Going Home after an Angioplasty and Right Heart Cath and Biopsy  ______________________________________________      After you go home:  Have an adult stay with you for 24 hours.  Drink plenty of fluids.  You may eat your normal diet, unless your doctor tells you otherwise.  For 24 hours:  Relax and take it easy.  Do NOT smoke.  Do NOT make any important or legal decisions.  Do NOT drive or operate machines at home or at work.  Do NOT drink alcohol.  Remove the Band-Aid after 24 hours. If there is minor oozing, apply another Band-aid and remove it after 12 hours. (Tomorrow after 4pm)  For 2 days, do NOT have sex or do any heavy exercise.  Do NOT take a bath, or use a hot tub or pool for at least 3 days. You may shower tomorrow after 4pm    Care of wrist or arm site:  It is normal to have soreness at the puncture site and mild tingling in your hand for up to 3 days.  For 2 days, do not use your hand or arm to support your weight (such as rising from a chair) or bend your wrist (such as lifting a garage door).  For 2 days, do not lift more than 5 pounds or exercise your arm (tennis, golf or bowling).    If you start bleeding from the site in your arm:  Sit down and press firmly on the site with your fingers for 10 minutes. Call your doctor as soon as you can.  If the bleeding stops, sit still and keep your wrist straight for 2 hours.    Medicines:  If you have stopped any other medicines, check with your nurse or provider about when to restart them.    Call 911 right away if you have bleeding that is heavy or does not stop.    Call your doctor if:  You have a large or growing hard lump around the site.  The site is red, swollen, hot or tender.  Blood or fluid is draining from the site.  You have chills or a fever greater than 101 F (38 C).  Your leg or arm feels numb or cool.  You have hives, a rash or unusual itching.      [facility] [place]:  [phone] (7 days a week)

## 2023-05-11 NOTE — PROGRESS NOTES
"May 11, 2023      \"Red\" Aron is a 62yr old male with a history of CAD (STEMI with ALYSSA to LAD 6/27/18), ICM (LVEF 20-25%, s/p HM3 LVAD 12/31/18) s/p OHT 5/18/20, monomorphic VT (s/p ICD with shocks), LV thrombus, CKD, elevated PSA, pAF, HTN, HL, and DMII who presents to clinic for routine follow up.   His post-transplant course was c/b NSVT (with no hemodynamic compromise), leukocytosis with C Acnes growing from his former LVAD site (thought to be a contaminant, but treated with IV vanco --> po doxy through 6/1/20, 14d course total), and in the setting of donor blood growing S anginosus and Veillonella (also thought to be a contaminant, but treated with Vanco/zosyn --> Vanco/Flagyl for a total of 7 days).  He also had hyperkalemia, which improved following kayexalate and stopping bactrim.  He ultimately discharged 5/29/20.     He was readmitted 8/31-9/24/20 with fever, cough, diarrhea, and syncope.  He was found to have a post-obstructive vs aspiration pneumonia, RUL/suprahilar mass, and narrowing of multiple RUL bronchi.  He was initially treated as a fungal infection as his fungitelle was +, but he did not clinically improve, nor did the size of the mass change.  Ultimately, tissue culture from the RUL mass showed abiotrophia defectiva (oral microbe), which was thought to be a contaminant, but did respond to antibiotics.  His MMF was decreased to 500mg twice daily     Rejection history:  none  AlloMap scores:  < 35  DSAs:  none  Coronary angio/Ischemic eval: Coronary angiogram 6/2021 showed normal coronary arteries with no angiographic evidence of obstruction.  Last RHC:  10/2021 showed normal biventricular filling pressures, with RA 4, mPA 20, PCW 14, and CI 2.7.  Echo/cMRI: TTE 2/2022 showed stable graft function, with LVEF 55-60% and normal RV size/function.     Overall he has been doing well and denies any new complaints.  He did just come from the Cath Lab and the procedure went well.  Denies any " dizziness lightheadedness palpitations chest pain lightheadedness or syncope.  No worsening shortness of breath.  Doing well overall.     Serostatus:  CMV D+/R-  EBV D-/R+  Toxo D-/R-      PAST MEDICAL HISTORY:  Past Medical History:   Diagnosis Date     Acute kidney injury (H)      CKD (chronic kidney disease)      Coronary artery disease      Diabetes mellitus (H)      HTN (hypertension)      Hyperlipidemia      ICD (implantable cardioverter-defibrillator), single chamber- NOT dependent 7/17/2018     Ischemic cardiomyopathy      LV (left ventricular) mural thrombus      Paroxysmal atrial flutter (H)      RVF (right ventricular failure) (H)      Systolic heart failure (H)      Ventricular tachycardia (H)      FAMILY HISTORY:  No relevant history    SOCIAL HISTORY:  Social History     Socioeconomic History     Marital status: Single   Tobacco Use     Smoking status: Never     Smokeless tobacco: Never   Substance and Sexual Activity     Alcohol use: No     Drug use: No   Other Topics Concern     Parent/sibling w/ CABG, MI or angioplasty before 65F 55M? No     CURRENT MEDICATIONS:  No current facility-administered medications for this visit.     No current outpatient medications on file.     Facility-Administered Medications Ordered in Other Visits   Medication     lidocaine (LMX4) cream     lidocaine 1 % 0.1-1 mL     Patient is NOT allergic to contrast dye     potassium chloride ER (KLOR-CON M) CR tablet 40 mEq     sodium chloride (PF) 0.9% PF flush 3 mL     sodium chloride (PF) 0.9% PF flush 3 mL     sodium chloride (PF) 0.9% PF flush 3 mL     sodium chloride (PF) 0.9% PF flush 3 mL     sodium chloride 0.9% infusion     ROS:   Constitutional: No fever, chills, or sweats.   ENT: No visual disturbance, ear ache, epistaxis, sore throat.   Allergies/Immunologic: Negative.   Respiratory: No cough, hemoptysis.   Cardiovascular: As per HPI.   GI: No nausea, vomiting, hematemesis, melena, or hematochezia.   : No urinary  "frequency, dysuria, or hematuria.   Integument: Negative.   Psychiatric: Pleasant, no major depression noted  Neuro: No focal neurological deficits noted  Endocrinology: Negative.   Musculoskeletal: As per HPI.      EXAM:  /80 (BP Location: Left arm, Patient Position: Sitting, Cuff Size: Adult Large)   Pulse 117   Ht 1.623 m (5' 3.9\")   Wt 87.5 kg (193 lb)   SpO2 95%   BMI 33.23 kg/m    In general, the patient is a pleasant male in no apparent distress.    HEENT: NC/AT. PERRLA. EOMI.  Sclerae white, not injected.    Neck:  No adenopathy, No thyromegaly.    COR: JVD at clavicle when sitting upright.  RRR.  Normal S1 S2 splits physiologically.  No murmur, rub click, or gallop.    Lungs:  CTA. No rhonchi.    Abdomen: soft, nontender, nondistended.  No organomegaly.  Extremities:  No clubbing, cyanosis, or LE edema.    Neuro: Alert & Oriented x 3, grossly non focal.  Integument: no open lesions, rashes, or jaundice.     Labs:  Lab Results   Component Value Date    WBC 7.7 05/11/2023    HGB 15.2 05/11/2023    HCT 46.9 05/11/2023     05/11/2023     05/11/2023    POTASSIUM 3.8 05/11/2023    CHLORIDE 103 05/11/2023    CO2 22 05/11/2023    BUN 34.3 (H) 05/11/2023    CR 1.75 (H) 05/11/2023     (H) 05/11/2023    DD 2.0 (H) 06/04/2020    NTBNPI 1,673 (H) 08/18/2020    NTBNP 404 (H) 06/26/2019    TROPI <0.015 09/17/2020    AST 23 05/11/2023    ALT 19 05/11/2023    ALKPHOS 67 05/11/2023    BILITOTAL 0.7 05/11/2023    INR 1.06 08/26/2020     RHC 10/26/21:  RA: --/--/6  RV: 27/6  PA: 25/18/22  PCWP: --/--/17  PA Sat: 62.3%  Hb: 11  TD CO/CI: 5/2.62  AMRIT CO/CI: 5.15/2.7  PVR: 1.0 (AMRIT)  SVR 1430      TTE 2/23/22:  Global and regional left ventricular function is normal with an EF of 55-60%.  Right ventricular function, chamber size, wall motion, and thickness are normal.  Pulmonary artery systolic pressure cannot be assessed. The inferior vena cava is normal.  No significant changes noted.      TTE " "5/18/22:  Left ventricular function is decreased. The ejection fraction is 40-45% (mildly reduced).  Global right ventricular function is mildly reduced.  No significant valvular abnormalities.  No pericardial effusion is present.  This study was compared with the study from 2/23/22: While comparison with the prior study is challenging due to poor image quality on the previous images, LV and RV function appear to have decreased slightly.     Coronary angiogram/RHC 5/18/22:  NOrmal coroanries  RA 7  RV 31/8  PA 31/18/24  PCWP 17  Cardiac output by William: 7.20 L/min (indexed to 3.6 L/min/m2)  PVR 1.0      Assessment and plan:   \"Red\" Aron is a 62yr old male with a history of CAD (STEMI with ALYSSA to LAD 6/27/18), ICM (LVEF 20-25%, s/p HM3 LVAD 12/31/18) s/p OHT 5/18/20, monomorphic VT (s/p ICD with shocks), LV thrombus, CKD, elevated PSA, pAF, HTN, HL, and DMII who presents to clinic for routine follow up.     Transthoracic echocardiogram reviewed today, LVEF is probably reported around 40 to 45% however final read is pending.  On review of the contrast images I do believe his ejection fraction is a little bit better closer to like 5055%.  This is grossly unchanged from previous.  As such we will not make any adjustments at this time.  Overall continues to do well without any issues.  He is coming from the cardiac catheterization laboratory but filling pressures were essentially normal cardiac output was normal biopsy was done and pending.  Coronary angiogram was also performed and it is normal.  Today he is doing well without any issues.  At this time we will not change any medications we will wait for some of the results to return.  He will return to his 4-year appointment as per schedule.  We encouraged him to make an appointment with a dentist given that he is overdue for this and also to see a urologist      [facility], s/p OHT 5/18/20  His post-transplant course was c/b NSVT (with no hemodynamic compromise), " leukocytosis with C Acnes growing from his former LVAD site (thought to be a contaminant, but treated with IV vanco --> po doxy through 6/1/20, 14d course total), and in the setting of donor blood growing S anginosus and Veillonella (also thought to be a contaminant, but treated with Vanco/zosyn --> Vanco/Flagyl for a total of 7 days).  He also had hyperkalemia, which improved following kayexalate and stopping bactrim.      Rejection history:  none  AlloMap scores:  < 35  DSAs:  none  Coronary angio/Ischemic eval: Coronary angiogram 6/2021 showed normal coronary arteries with no angiographic evidence of obstruction.  Last RHC:  10/2021 showed normal biventricular filling pressures, with RA 4, mPA 20, PCW 14, and CI 2.7.  Echo/cMRI: TTE 2/2022 showed stable graft function, with LVEF 55-60% and normal RV size/function.     Serostatus:  - CMV D+/R-  - EBV D-/R+  - Toxo D-/R-      PPx:  - CAV:  Continue rosuvastatin 10mg daily.  - GI:  Pantoprazole 40mg daily  - Osteoporosis:  Calcium/vitamin d supplements     Graft function:  - BPs:  Controlled, continue amlodipine 5mg daily and hydralazine 100mg TID  - fluid status:  Euvolemic, will review torsemide dosing after RHC today -- currently taking prn, has taken 2 doses in the last 2 days     Health maintenance:  - derm exam overdue  - dental exam overdue  - eye exam UTD, has developing cataracts  - COVID shots   - pneumonia shots -- advised that he discuss with PCP  - following with urology     Post-obstructive versus aspiration pneumonia  RUL/suprahilar mass   +abiotrophia defectiva  He was readmitted 8/31/20-9/24/20 with fever, cough, diarrhea, and syncope.  He was found to have a post-obstructive vs aspiration pneumonia, RUL/suprahilar mass, and narrowing of multiple RUL bronchi.  He was initially treated as a fungal infection as his fungitelle was +, but he did not clinically improve, nor did the size of the mass change.  Ultimately, tissue culture from the RUL mass  showed abiotrophia defectiva (oral microbe), which was thought to be a contaminant, but did respond to antibiotics.  His MMF was decreased to 500mg twice daily, as ImmuKnow was noted to be low.  He completed 4 weeks of antibiotic therapy 10/11/20, and follow up chest CTs have shown improvement in the RUL infiltrate.     DMII  HgbA1c remains elevated, advised that he follow-up with endo.  He has started Farxiga.     Elevated PSA  Prostate MRI 5/2020 showed signs of BPH, he follows with urology.     Possible chronic prostatits  Treated with antibiotics for > 1 month between 8/2020-10/2020.     I appreciate the opportunity to participate in the care of Mariusz Sharp . Please do not hesitate to contact me with any further questions.     Sincerely,   Gerardo Stone MD  [facility] Division of Cardiology    No

## 2023-05-12 ENCOUNTER — TELEPHONE (OUTPATIENT)
Dept: TRANSPLANT | Facility: CLINIC | Age: 63
End: 2023-05-12
Payer: COMMERCIAL

## 2023-05-12 DIAGNOSIS — Z94.1 STATUS POST HEART TRANSPLANTATION (H): ICD-10-CM

## 2023-05-12 LAB
CMV DNA SPEC NAA+PROBE-ACNC: NOT DETECTED IU/ML
EBV DNA # SPEC NAA+PROBE: <500 COPIES/ML
EBV DNA SPEC NAA+PROBE-LOG#: <2.7 {LOG_COPIES}/ML
EVEROLIMUS BLD-MCNC: 11.3 UG/L (ref 3–8)
PATH REPORT.COMMENTS IMP SPEC: NORMAL
PATH REPORT.COMMENTS IMP SPEC: NORMAL
PATH REPORT.FINAL DX SPEC: NORMAL
PATH REPORT.GROSS SPEC: NORMAL
PATH REPORT.MICROSCOPIC SPEC OTHER STN: NORMAL
PATH REPORT.RELEVANT HX SPEC: NORMAL
PHOTO IMAGE: NORMAL
TME LAST DOSE: ABNORMAL H
TME LAST DOSE: ABNORMAL H

## 2023-05-12 RX ORDER — EVEROLIMUS 0.5 MG/1
TABLET ORAL
Qty: 450 TABLET | Refills: 3 | Status: SHIPPED | OUTPATIENT
Start: 2023-05-12 | End: 2023-12-22

## 2023-05-12 NOTE — TELEPHONE ENCOUNTER
Pt called with lab results from yesterday's visit.  Everolimus level 11.4.  Goal 6-8.  Pt to decrease dose to 1.5mg in the AM and 1mg in the PM and recheck in 1-2 weeks.  Heart biopsy is negative.  VM left with above information and pt encouraged to call back with any questions or concerns.

## 2023-05-12 NOTE — LETTER
May 12, 2023    Patient Name: Mariusz Sharp   :  1960   MRN: [id number]  ICD10:  z94.1 , z79.899    Subject: Request for Labs    Mariusz Shapr is a heart transplant patient currently being followed by the [facility].  We would like to request your assistance in obtaining the following laboratory tests which are part of our routine surveillance program for heart transplant recipients.  (Items needed are checked.)    Please fax the lab results as soon as they are available to:   Thoracic Transplant Department  Fax Number:  [phone]     Item Frequency   X Basic metabolic panel (including:  BUN,   Serum Creatinine, Sodium, Potassium, Chloride,   CO2, Calcium, Magnesium, Phosphorus, and   Glucose) Once in May or 2023   X Everolimus level  (Should be mailed to the [facility] in the  provided to you by the patient.) Once in May or 2023     Thank you  for your continued support and the opportunity to collaborate in the care of this patient.  If you have any questions, please call the Thoracic Transplant Team at [phone] or [phone].      Dr. Gerardo Stone  [facility] Division of Cardiology

## 2023-05-14 LAB
ATRIAL RATE - MUSE: 113 BPM
DIASTOLIC BLOOD PRESSURE - MUSE: NORMAL MMHG
DONOR IDENTIFICATION: NORMAL
DSA COMMENTS: NORMAL
DSA PRESENT: NO
DSA TEST METHOD: NORMAL
INTERPRETATION ECG - MUSE: NORMAL
ORGAN: NORMAL
P AXIS - MUSE: 53 DEGREES
PR INTERVAL - MUSE: 144 MS
QRS DURATION - MUSE: 112 MS
QT - MUSE: 332 MS
QTC - MUSE: 455 MS
R AXIS - MUSE: 59 DEGREES
SA 1 CELL: NORMAL
SA 1 TEST METHOD: NORMAL
SA 2 CELL: NORMAL
SA 2 TEST METHOD: NORMAL
SA1 HI RISK ABY: NORMAL
SA1 MOD RISK ABY: NORMAL
SA2 HI RISK ABY: NORMAL
SA2 MOD RISK ABY: NORMAL
SYSTOLIC BLOOD PRESSURE - MUSE: NORMAL MMHG
T AXIS - MUSE: 35 DEGREES
UNACCEPTABLE ANTIGENS: NORMAL
UNOS CPRA: 0
VENTRICULAR RATE- MUSE: 113 BPM
ZZZSA 1  COMMENTS: NORMAL
ZZZSA 2 COMMENTS: NORMAL

## 2023-05-15 ENCOUNTER — TELEPHONE (OUTPATIENT)
Dept: TRANSPLANT | Facility: CLINIC | Age: 63
End: 2023-05-15
Payer: COMMERCIAL

## 2023-05-15 LAB
ALLOMAP SCORE (EXTERNAL): 21 (ref 0–40)
IMMUKNOW IMMUNE CELL FUNCTION: 542 NG/ML
NEGATIVE PREDICTIVE VALUE PERCENT (EXTERNAL): 100 %
POSITIVE PREDICTIVE VALUE PERCENT (EXTERNAL): 1.9 %

## 2023-05-15 NOTE — TELEPHONE ENCOUNTER
VM left with pt asking him to call back to confirm he received VM regarding Everolimus dose changes.

## 2023-05-17 NOTE — CONFIDENTIAL NOTE
Care Management Note    Patient was made aware of eligibility for the Care Management Program and provided with the rationale for how the patient was identified due to medical data. Patient made aware of the ability to opt out of the Care Management Program at any time. Situation:   Outreach for routine follow up. Key Assessments:   BP stable, DM stable, 90-360mg/dL taking insulin as ordered. Mood stable  Needs refills, has f/u scheduled      Chronic Conditions, as applicable: Depression, Anxiety, DM    Care Gaps, as applicable: Due for M6Z, eye exam    Actions Taken:   Medication review, general assessment completed. Contacted provider for refill requests    Program plan:   Continue to monitor blood pressure and blood sugars. Adjust diet accordingly. Monitor for changes in mood. Reach out to provider if in crisis. Next Follow Up: Will follow up with patient during the week of 5/25/2023    Plan of care reviewed with Patient and she agrees with the plan of care and the call follow up plan. Reason for visit: PSA follow up     Relevant information: Elevated PSA    Records/imaging/labs/orders: PSA needed    Pt called: Send ITema message about PSA    At Rooming: Standard

## 2023-05-18 ENCOUNTER — TELEPHONE (OUTPATIENT)
Dept: TRANSPLANT | Facility: CLINIC | Age: 63
End: 2023-05-18
Payer: COMMERCIAL

## 2023-05-18 DIAGNOSIS — Z94.1 STATUS POST HEART TRANSPLANTATION (H): ICD-10-CM

## 2023-05-18 RX ORDER — MYCOPHENOLATE MOFETIL 250 MG/1
1250 CAPSULE ORAL 2 TIMES DAILY
Qty: 300 CAPSULE | Refills: 11 | Status: SHIPPED | OUTPATIENT
Start: 2023-05-18 | End: 2024-06-04

## 2023-05-18 NOTE — TELEPHONE ENCOUNTER
Danna 542- high immune cell response.  Reviewed with Dr. Stone.  Pt to increase MMF to 1250BID. VM left with pt with instructions.  Pt encouraged to call back with any questions.

## 2023-06-01 ENCOUNTER — LAB (OUTPATIENT)
Dept: LAB | Facility: CLINIC | Age: 63
End: 2023-06-01
Payer: COMMERCIAL

## 2023-06-01 DIAGNOSIS — Z94.1 STATUS POST HEART TRANSPLANTATION (H): ICD-10-CM

## 2023-06-01 PROCEDURE — 80169 DRUG ASSAY EVEROLIMUS: CPT | Performed by: INTERNAL MEDICINE

## 2023-06-06 ENCOUNTER — MYC MEDICAL ADVICE (OUTPATIENT)
Dept: ENDOCRINOLOGY | Facility: CLINIC | Age: 63
End: 2023-06-06

## 2023-06-06 NOTE — PROGRESS NOTES
Outcome for 06/06/23 9:11 AM: IP Streett message sent  Dorothy Villeda MA  Outcome for 06/13/23 2:45 PM: Left Voicemail   Dorothy Villeda MA   Outcome for 06/14/23 1:19 PM: Left Voicemail   Dorothy Villeda MA  Outcome for 06/15/23 9:06 AM: Data obtained via [employer] website  Dorothy Villeda MA    Patient is showing 5/5 [place] met.   Dorothy Villeda MA

## 2023-06-07 LAB
EVEROLIMUS BLD-MCNC: 5.6 UG/L (ref 3–8)
TME LAST DOSE: NORMAL H
TME LAST DOSE: NORMAL H

## 2023-06-13 ENCOUNTER — TELEPHONE (OUTPATIENT)
Dept: ENDOCRINOLOGY | Facility: CLINIC | Age: 63
End: 2023-06-13
Payer: COMMERCIAL

## 2023-06-13 NOTE — TELEPHONE ENCOUNTER
Called patient and left voicemail. Patient has an appointment on 6/15/23. Need to collect the last 14 days worth of blood sugar readings to prepare for patient's visit.   Dorothy Villeda MA

## 2023-06-15 ENCOUNTER — TELEPHONE (OUTPATIENT)
Dept: ENDOCRINOLOGY | Facility: CLINIC | Age: 63
End: 2023-06-15

## 2023-06-15 ENCOUNTER — VIRTUAL VISIT (OUTPATIENT)
Dept: ENDOCRINOLOGY | Facility: CLINIC | Age: 63
End: 2023-06-15
Payer: COMMERCIAL

## 2023-06-15 DIAGNOSIS — Z79.4 TYPE 2 DIABETES MELLITUS WITH HYPERGLYCEMIA, WITH LONG-TERM CURRENT USE OF INSULIN (H): Primary | ICD-10-CM

## 2023-06-15 DIAGNOSIS — E11.65 TYPE 2 DIABETES MELLITUS WITH HYPERGLYCEMIA, WITH LONG-TERM CURRENT USE OF INSULIN (H): Primary | ICD-10-CM

## 2023-06-15 PROCEDURE — 99214 OFFICE O/P EST MOD 30 MIN: CPT | Mod: VID | Performed by: PHYSICIAN ASSISTANT

## 2023-06-15 RX ORDER — SEMAGLUTIDE 1.34 MG/ML
INJECTION, SOLUTION SUBCUTANEOUS
Qty: 15 ML | Refills: 1 | Status: SHIPPED | OUTPATIENT
Start: 2023-06-15 | End: 2024-08-07

## 2023-06-15 RX ORDER — SEMAGLUTIDE 1.34 MG/ML
INJECTION, SOLUTION SUBCUTANEOUS
COMMUNITY
Start: 2023-06-14 | End: 2023-06-15

## 2023-06-15 NOTE — PROGRESS NOTES
Time of start: 9:06 am  Time of end:   Total duration of video visit:  Providers location: offsite.  Patients location: [place].    [facility]  Mariusz ( Red Sharp is a 62 year old male with type 2 diabetes mellitus. Video visit today for diabetes care.  Pt was admitted 5/17/2020-5/29/2020 and underwent a heart transplant on 5/18/2020.  Pt's hx is significant for type 2 diabetes mellitus dx 5 + years ago, HTN, hyperlipidemia, hx of ischemic heart disease s/p STEMI with ALYSSA 2019, LVAD placement, Stage 3 CKD, and obesity.  Red was also admitted 5/12-5/15/2020 for ANDREW due to nephrolithiasis complicated by hydronephrosis/ANDREW.  For his diabetes, pt is taking Ozempic 0.5 mg subcutaneous once a week ( 3rd dose this week ),  Lantus 36 units subcutaneous each pm and Humalog 1 unit/5 gms CHO with meals with correction scale  ( 1 unit/25 for BG > 150 ) and bedtime correction scale ( 1 unit/25 for BG > 200).   He is also taking Farxiga 10 mg each am.  Most recent A1C was 6.9 % yesterday ( 6/14/2023 ) at his PCP's office.  Pt's A1C was 7.4 5 on 5/11/2023.  His insurance will no longer cover his DexcomG6 sensor supplies, so he is not using his Dexcom sensor.  Red is now using a Freestyle Libre2 sensor to monitor his blood sugars.  His sensor download is below and looks good.  Pt's blood sugar is in target 70 % of he time with no hypoglycemia.  On ROS today, mild tingling in both feet. Denies foot ulcers/sores.  Pt denies blurred vision, n/v, SOB at rest,cough,fever,chest pain, abd pain, diarrhea, dysuria or hematuria.    Diabetes Care  Retinopathy:none; pt seen by Oph in Jan 2023 with no retinopathy per patient.  Nephropathy:yes- hx of CKD/ANDREW.  Neuropathy: mild tingling in both feet.  Foot Exam:no exam today.  Taking aspirin: yes.  Lipids: LDL 61 in 5/2023.  Pt is taking Crestor.  CAD: yes; s/p heart transplant on 5/18/2020.  Depression: no.  Insulin: Basal and meal time insulin with correction scale ( 1 unit/25 for BG > 150 with  meals and > 200 at bedtime).    DM meds: Farxiga and Ozempic today.  Testing: Freestyle Libre2 sensor.          ROS  See under HPI.    Allergies  No Known Allergies    Medications  Current Outpatient Medications   Medication Sig Dispense Refill     ACCU-CHEK [place]OTTE PLUS test strip Check BG 3 times daily at meals and at bedtime. 400 strip 3     acetaminophen (TYLENOL) 325 MG tablet Take 2 tablets (650 mg) by mouth every 4 hours as needed for other (multimodal surgical pain management along with NSAIDS and opioid medication as indicated based on pain control and physical function.)       amLODIPine (NORVASC) 10 MG tablet Take 1 tablet (10 mg) by mouth daily 90 tablet 3     aspirin (ASA) 81 MG EC tablet Take 1 tablet (81 mg) by mouth daily 30 tablet 11     BD SILVANA U/F 32G X 4 MM insulin pen needle Use 6 times daily. 600 each 3     calcium carbonate 600 mg-vitamin D 400 units (CALTRATE) 600-400 MG-UNIT per tablet Take 1 tablet by mouth 2 times daily (with meals)       Continuous Blood Gluc Sensor (FREESTYLE DARRELL 2 SENSOR) [facility] 1 each every 14 days Use 1 sensor every 14 days. Use to read blood sugars per 's instructions. 2 each 5     dapagliflozin (FARXIGA) 10 MG TABS tablet Take 1 tablet (10 mg) by mouth daily 90 tablet 3     everolimus (ZORTRESS) 0.5 MG tablet Take 3 tablets (1.5 mg) by mouth every morning AND 2 tablets (1 mg) every evening. 450 tablet 3     HUMALOG KWIKPEN 100 UNIT/ML soln INJECT ONE UNIT PER EVERY 5 GRAMS OF CARBS WITH MEALS AND SNACKS, PLUS CORRECTION, APPROX 70 UNITS IN 24 HOURS 75 mL 3     hydrALAZINE (APRESOLINE) 50 MG tablet Take 2.5 tablets (125 mg) by mouth 3 times daily 675 tablet 3     insulin glargine (LANTUS PEN) 100 UNIT/ML pen Inject 36 units subcutaneous at hs. 15 mL 3     loperamide (IMODIUM) 2 MG capsule Take 1 capsule (2 mg) by mouth 4 times daily as needed for diarrhea 60 capsule 0     magnesium oxide (MAG-OX) 400 (241.3 Mg) MG tablet Take 1 tablet (400 mg) by mouth  daily 90 tablet 3     multivitamin w/minerals (THERA-VIT-M) tablet Take 1 tablet by mouth daily 90 tablet 3     mycophenolate (GENERIC EQUIVALENT) 250 MG capsule Take 5 capsules (1,250 mg) by mouth 2 times daily 300 capsule 11     OZEMPIC, 1 MG/DOSE, 4 MG/3ML pen        pantoprazole (PROTONIX) 40 MG EC tablet Take 1 tablet (40 mg) by mouth every morning (before breakfast) 90 tablet 3     potassium chloride ER (KLOR-CON M) 20 MEQ CR tablet Take 1 tablet (20 mEq) by mouth daily 90 tablet 3     rosuvastatin (CRESTOR) 20 MG tablet Take 1 tablet (20 mg) by mouth daily 90 tablet 3     torsemide (DEMADEX) 20 MG tablet Take 1 tablet (20 mg) by mouth daily 90 tablet 3       Family History  Mother and father with hx of type 2 DM.  Social History   reports that he has never smoked. He has never used smokeless tobacco. He reports that he does not drink alcohol and does not use drugs.     Past Medical History  Past Medical History:   Diagnosis Date     Acute kidney injury (H)      CKD (chronic kidney disease)      Coronary artery disease      Diabetes mellitus (H)      HTN (hypertension)      Hyperlipidemia      ICD (implantable cardioverter-defibrillator), single chamber- NOT dependent 7/17/2018     Ischemic cardiomyopathy      LV (left ventricular) mural thrombus      Paroxysmal atrial flutter (H)      RVF (right ventricular failure) (H)      Systolic heart failure (H)      Ventricular tachycardia (H)        Past Surgical History:   Procedure Laterality Date     BRONCHOSCOPY (RIGID OR FLEXIBLE), DIAGNOSTIC N/A 9/15/2020    Procedure: BRONCHOSCOPY, WITH BRONCHOALVEOLAR LAVAGE;  Surgeon: Elder Mejia MD;  Location:  GI     BRONCHOSCOPY (RIGID OR FLEXIBLE), DIAGNOSTIC N/A 9/17/2020    Procedure: BRONCHOSCOPY, WITH BRONCHOALVEOLAR LAVAGE;  Surgeon: Bill Lemus MD;  [facility]     BRONCHOSCOPY RIGID OR FLEXIBLE W/TRANSENDOSCOPIC ENDOBRONCHIAL ULTRASOUND GUIDED Right 9/8/2020    Procedure: BRONCHOSCOPY, WITH  ENDOBRONCHIAL ULTRASOUND;  Surgeon: Donny Mercedes MD;  Location: [facility] GI     COLONOSCOPY N/A 12/7/2018    Procedure: COLONOSCOPY;  Surgeon: Krish Mercado MD;  [facility]     CV CORONARY ANGIOGRAM N/A 6/23/2021    Procedure: CV CORONARY ANGIOGRAM;  Surgeon: Brian Long MD;  [facility]     CV CORONARY ANGIOGRAM N/A 5/18/2022    Procedure: Coronary Angiogram [[id number]];  Surgeon: Khris Mcclelland MD;  [facility]     CV CORONARY ANGIOGRAM N/A 5/11/2023    Procedure: Coronary Angiogram;  Surgeon: Khris Mcclelland MD;  [facility]     CV HEART BIOPSY N/A 5/24/2020    Procedure: Heart Biopsy;  Surgeon: Kit Bacon MD;  [facility]     CV HEART BIOPSY N/A 6/1/2020    Procedure: CV HEART BIOPSY;  Surgeon: Kit Bacon MD;  [facility]     CV HEART BIOPSY N/A 6/8/2020    Procedure: CV HEART BIOPSY;  Surgeon: Kit Bacon MD;  Location:  HEART CARDIAC CATH LAB     CV HEART BIOPSY N/A 6/15/2020    Procedure: CV HEART BIOPSY;  Surgeon: iKt Bacon MD;  Location:  HEART CARDIAC CATH LAB     CV HEART BIOPSY N/A 6/30/2020    Procedure: CV HEART BIOPSY;  Surgeon: Kit Bacon MD;  [facility]     CV HEART BIOPSY N/A 7/14/2020    Procedure: CV HEART BIOPSY;  Surgeon: Brian Long MD;  [facility]     CV HEART BIOPSY N/A 7/29/2020    Procedure: CV HEART BIOPSY;  Surgeon: Momo Hunt MD;  Location:  HEART CARDIAC CATH LAB     CV HEART BIOPSY N/A 8/13/2020    Procedure: CV HEART BIOPSY;  Surgeon: Khris Mcclelland MD;  Location:  HEART CARDIAC CATH LAB     CV HEART BIOPSY N/A 8/26/2020    Procedure: CV HEART BIOPSY;  Surgeon: Momo Hunt MD;  Location:  HEART CARDIAC CATH LAB     CV HEART BIOPSY N/A 9/30/2020     Procedure: CV HEART BIOPSY;  Surgeon: Artemio Ulloa MD;  [facility]     CV HEART BIOPSY N/A 10/29/2020    Procedure: CV HEART BIOPSY;  Surgeon: Kit Bacon MD;  [facility]     CV HEART BIOPSY N/A 1/5/2021    Procedure: CV HEART BIOPSY;  Surgeon: Carlin Garcia MD;  [facility]     CV HEART BIOPSY N/A 6/23/2021    Procedure: CV HEART BIOPSY;  Surgeon: Brian Long MD;  [facility]     CV HEART BIOPSY N/A 10/26/2021    Procedure: CV HEART BIOPSY;  Surgeon: Kit Bacon MD;  [facility]     CV HEART BIOPSY N/A 5/18/2022    Procedure: Heart Biopsy;  Surgeon: Khris Mcclelland MD;  [facility]     CV HEART BIOPSY N/A 5/11/2023    Procedure: Heart Biopsy;  Surgeon: Khris Mcclelland MD;  Location:  HEART CARDIAC CATH LAB     CV RIGHT HEART CATH MEASUREMENTS RECORDED N/A 2/19/2019    Procedure: RHC;  Surgeon: Brian Long MD;  Location:  HEART CARDIAC CATH LAB     CV RIGHT HEART CATH MEASUREMENTS RECORDED N/A 7/5/2019    Procedure: CV RIGHT HEART CATH;  Surgeon: Khris Mcclelland MD;  Location:  HEART CARDIAC CATH LAB     CV RIGHT HEART CATH MEASUREMENTS RECORDED N/A 5/24/2020    Procedure: Right Heart Cath;  Surgeon: Kit Bacon MD;  Location:  HEART CARDIAC CATH LAB     CV RIGHT HEART CATH MEASUREMENTS RECORDED N/A 6/1/2020    Procedure: CV RIGHT HEART CATH;  Surgeon: Kit Bacon MD;  Location:  HEART CARDIAC CATH LAB     CV RIGHT HEART CATH MEASUREMENTS RECORDED N/A 6/8/2020    Procedure: CV RIGHT HEART CATH;  Surgeon: Kit Bacon MD;  Location:  HEART CARDIAC CATH LAB     CV RIGHT HEART CATH MEASUREMENTS RECORDED N/A 6/15/2020    Procedure: CV RIGHT HEART CATH;  Surgeon: Kit Bacon MD;  Location: [facility] CARDIAC  CATH LAB     CV RIGHT HEART CATH MEASUREMENTS RECORDED N/A 6/30/2020    Procedure: CV RIGHT HEART CATH;  Surgeon: Kit Bacon MD;  Location:  HEART CARDIAC CATH LAB     CV RIGHT HEART CATH MEASUREMENTS RECORDED N/A 7/14/2020    Procedure: CV RIGHT HEART CATH;  Surgeon: Brian Long MD;  Location:  HEART CARDIAC CATH LAB     CV RIGHT HEART CATH MEASUREMENTS RECORDED N/A 7/29/2020    Procedure: CV RIGHT HEART CATH;  Surgeon: Momo Hunt MD;  Location:  HEART CARDIAC CATH LAB     CV RIGHT HEART CATH MEASUREMENTS RECORDED N/A 8/13/2020    Procedure: CV RIGHT HEART CATH;  Surgeon: Khris Mcclelland MD;  Location:  HEART CARDIAC CATH LAB     CV RIGHT HEART CATH MEASUREMENTS RECORDED N/A 8/26/2020    Procedure: CV RIGHT HEART CATH;  Surgeon: Momo Hunt MD;  Location:  HEART CARDIAC CATH LAB     CV RIGHT HEART CATH MEASUREMENTS RECORDED N/A 9/30/2020    Procedure: Right Heart Cath;  Surgeon: Artemio Ulloa MD;  Location:  HEART CARDIAC CATH LAB     CV RIGHT HEART CATH MEASUREMENTS RECORDED N/A 10/29/2020    Procedure: CV RIGHT HEART CATH;  Surgeon: Kit Bacon MD;  Location:  HEART CARDIAC CATH LAB     CV RIGHT HEART CATH MEASUREMENTS RECORDED N/A 1/5/2021    Procedure: CV RIGHT HEART CATH;  Surgeon: Carlin Garcia MD;  Location:  HEART CARDIAC CATH LAB     CV RIGHT HEART CATH MEASUREMENTS RECORDED N/A 6/23/2021    Procedure: CV RIGHT HEART CATH;  Surgeon: Brian Long MD;  Location:  HEART CARDIAC CATH LAB     CV RIGHT HEART CATH MEASUREMENTS RECORDED N/A 10/26/2021    Procedure: CV RIGHT HEART CATH;  Surgeon: Kit Bacon MD;  Location:  HEART CARDIAC CATH LAB     CV RIGHT HEART CATH MEASUREMENTS RECORDED N/A 5/18/2022    Procedure: Right Heart Catheterization;  Surgeon: Khris Mcclelland MD;  Location:  HEART CARDIAC CATH LAB     CV RIGHT HEART CATH MEASUREMENTS RECORDED N/A 5/11/2023     Procedure: Right Heart Catheterization;  Surgeon: Khris Mcclelland MD;  Location:  HEART CARDIAC CATH LAB     ENDOBRONCHIAL ULTRASOUND FLEXIBLE N/A 9/17/2020    Procedure: BRONCHOSCOPY, flexible, endobronchial ultrasound with transbronchial biopsies, endobronchial biopsies;  Surgeon: Bill Lemus MD;  [facility]      PRQ [id number] CORONARY ANGIOPLASTY ADDL BRANCH      PCI and ALYSSA to ostial LAD on 6/27/18     IMPLANT AUTOMATIC IMPLANTABLE CARDIOVERTER DEFIBRILLATOR       INSERT VENTRICULAR ASSIST DEVICE LEFT (HEARTMATE II) N/A 12/31/2018    Procedure: Median Sternotomy, On Cardiopulmonary Bypass Pump, Insertion of Left Ventricular Assist Device (Heartmate III);  Surgeon: Kamaljit Baker MD;  [facility]     PICC DOUBLE LUMEN PLACEMENT Right 05/22/2020    5FR DL PICC inserted at right basilic vein, length 38cm.     TRANSPLANT HEART RECIPIENT AFTER HEART MATE N/A 5/18/2020    Procedure: REDO MEDIAN STERNOTOMY, LYSIS OF ADHESIONS, ON CARDIOPULMONARY BYPASS PUMP, HEART TRANSPLANT RECIPIENT, REMOVAL OF LEFT VENTRICULAR ASSIST DEVICE, REMOVAL OF AUTOMATED IMPLANTABLE CARDIOVERTER DEFIBRILLATOR;  Surgeon: Elder Willis MD;  [facility]       Physical Exam    No exam today.    RESULTS  Creatinine   Date Value Ref Range Status   05/11/2023 1.75 (H) 0.67 - 1.17 mg/dL Final   06/23/2021 1.93 (H) 0.66 - 1.25 mg/dL Final     GFR Estimate   Date Value Ref Range Status   05/11/2023 44 (L) >60 mL/min/1.73m2 Final     Comment:     eGFR calculated using 2021 CKD-EPI equation.   06/23/2021 37 (L) >60 mL/min/[1.73_m2] Final     Comment:     Non  GFR Calc  Starting 12/18/2018, serum creatinine based estimated GFR (eGFR) will be   calculated using the Chronic Kidney Disease Epidemiology Collaboration   (CKD-EPI) equation.       Hemoglobin A1C   Date Value Ref Range Status   05/11/2023 7.4 (H) <5.7 % Final     Comment:     Normal <5.7%   Prediabetes 5.7-6.4%    Diabetes 6.5% or higher      Note: Adopted from ADA consensus guidelines.   06/23/2021 10.3 (H) 0 - 5.6 % Final     Comment:     Normal <5.7% Prediabetes 5.7-6.4%  Diabetes 6.5% or higher - adopted from ADA   consensus guidelines.       Potassium   Date Value Ref Range Status   05/11/2023 3.8 3.4 - 5.3 mmol/L Final   05/18/2022 3.5 3.4 - 5.3 mmol/L Final   06/23/2021 3.6 3.4 - 5.3 mmol/L Final     ALT   Date Value Ref Range Status   05/11/2023 19 10 - 50 U/L Final   06/23/2021 26 0 - 70 U/L Final     AST   Date Value Ref Range Status   05/11/2023 23 10 - 50 U/L Final   06/23/2021 22 0 - 45 U/L Final     TSH   Date Value Ref Range Status   03/03/2021 1.87 0.40 - 4.00 mU/L Final     T4 Free   Date Value Ref Range Status   03/03/2021 1.08 0.76 - 1.46 ng/dL Final       Cholesterol   Date Value Ref Range Status   05/11/2023 153 <200 mg/dL Final   02/15/2023 186 <200 mg/dL Final   06/23/2021 168 <200 mg/dL Final   10/29/2020 161 <200 mg/dL Final     HDL Cholesterol   Date Value Ref Range Status   06/23/2021 49 >39 mg/dL Final   10/29/2020 51 >39 mg/dL Final     Direct Measure HDL   Date Value Ref Range Status   05/11/2023 36 (L) >=40 mg/dL Final   02/15/2023 39 (L) >=40 mg/dL Final     LDL Cholesterol Calculated   Date Value Ref Range Status   05/11/2023 61 <=100 mg/dL Final   02/15/2023 106 (H) <=100 mg/dL Final   06/23/2021 75 <100 mg/dL Final     Comment:     Desirable:       <100 mg/dl   10/29/2020 81 <100 mg/dL Final     Comment:     Desirable:       <100 mg/dl     LDL Cholesterol Direct   Date Value Ref Range Status   10/26/2021 105 (H) <100 mg/dL Final     Comment:     Age 0-19 years:  Desirable: 0-110 mg/dL   Borderline high: 110-129 mg/dL   High: >= 130 mg/dL    Age 20 years and older:  Desirable: <100mg/dL  Above desirable: 100-129 mg/dL   Borderline high: 130-159 mg/dL   High: 160-189 mg/dL   Very high: >= 190 mg/dL     Triglycerides   Date Value Ref Range Status   05/11/2023 280 (H) <150 mg/dL Final   02/15/2023 206 (H) <150 mg/dL Final    06/23/2021 220 (H) <150 mg/dL Final     Comment:     Borderline high:  150-199 mg/dl  High:             200-499 mg/dl  Very high:       >499 mg/dl     10/29/2020 144 <150 mg/dL Final       ASSESSMENT/PLAN:      1.  TYPE 2 DIABETES MELLITUS: Glycemic control has improved with addition of Ozempic.  Increase Ozempic 1 mg subcutaneous once a week once he has taken Ozempic 0.5 mg once a week x 1 month.  If he has blood sugar values < 70 he is to notify me. He is aware that he may need less Humalog once starts taking Ozempic 1 mg subcutaneous once a week.  For now, continue Lantus 36 units subcutaneous at hs and continue Humalog 1 unit/5 gms CHO with meals and snacks,plus correction insulin 1 unit/25 for BG > 140 with meals and > 200 at bedtime.  Continue Farxiga 10 mg daily.    Again, I reviewed how Farxiga works and possible side effects including dehydration, yeast infection and possible UTI.   Reminded him to drink plenty of water.  Avoid use of Metformin due to CKD and lower GFR.  Continue to use Freestyle Libre2 sensor to monitor blood sugars.  Pt was seen by Oph in Jan 2023 without retinopathy per patient.  He reports mild tingling in his feet.  No foot ulcers at this time.  He is taking ASA daily.    2.  CKD/ANDREW: Creat 1.80 with GFR 43 mL/min on 6/1/2023.  Avoid use of Metformin.  Pt taking Farxiga.    3.  HX OF ISCHEMIC CARDIOMYOPATHY: S/P heart transplant on 5/18/2020 and followed closely by transplant staff here.    4.  HTN: /80 on 5/11/2023.  Continue current RXs.    5.  WEIGHT GAIN: Some weight loss with addition of Ozempic.  Reminded patient to make healthy food choices, reduce food portions with meals, try to avoid snacking and increase activity.     6  FOLLOW UP : with me in 2 months.  Ozempic 1 mg pen ordered today.    Time spent reviewing chart, labs and glucose data today = 8 minutes.  Time for video visit today = 16 minutes.  Time for documentation today = 15 minutes.    TOTAL TIME FOR VIDEO  VISIT TODAY= 39  minutes.    Jeannette Schafer PA-C     Answers for HPI/ROS submitted by the patient on 6/12/2023  General Symptoms: No  Skin Symptoms: No  HENT Symptoms: No  EYE SYMPTOMS: No  HEART SYMPTOMS: No  LUNG SYMPTOMS: No  INTESTINAL SYMPTOMS: No  URINARY SYMPTOMS: No  REPRODUCTIVE SYMPTOMS: No  SKELETAL SYMPTOMS: No  BLOOD SYMPTOMS: No  NERVOUS SYSTEM SYMPTOMS: No  MENTAL HEALTH SYMPTOMS: No

## 2023-06-15 NOTE — NURSING NOTE
Is the patient currently in the state of [place]? YES    Visit mode:VIDEO    If the visit is dropped, the patient can be reconnected by: VIDEO VISIT: Text to cell phone: [phone]    Will anyone else be joining the visit? NO      How would you like to obtain your AVS? MyChart    Are changes needed to the allergy or medication list? NO    Reason for visit: RECHECK

## 2023-06-15 NOTE — TELEPHONE ENCOUNTER
Communication Summary: Spoke to patient and scheduled follow up     Appointment type: Return Diabetes  Provider: Jeannette Schafer  Return date: 8/11/2023  Speciality phone number: [phone]  Additional appointment(s) needed: N/A  Additional notes: N/A    Yanna Denney on 6/15/2023 at 10:40 AM

## 2023-06-15 NOTE — LETTER
6/15/2023       RE: Mariusz Sharp  [address] [facility] [address]     Dear Colleague,    Thank you for referring your patient, Mariusz Sharp, to the [facility] [place] [facility]. Please see a copy of my visit note below.    Outcome for 06/06/23 9:11 AM: Bike HUD message sent  Dorothy Villeda MA  Outcome for 06/13/23 2:45 PM: Left Voicemail   Dorothy Villeda MA   Outcome for 06/14/23 1:19 PM: Left Voicemail   Dorothy Villeda MA  Outcome for 06/15/23 9:06 AM: Data obtained via [employer] website  Dorothy Villeda MA    Patient is showing 5/5 MNCM met.   Dorothy Villeda MA          Time of start: 9:06 am  Time of end:   Total duration of video visit:  Providers location: offsite.  Patients location: [place].    CHANELL Vee ( Red ) ISABEL Sharp is a 62 year old male with type 2 diabetes mellitus. Video visit today for diabetes care.  Pt was admitted 5/17/2020-5/29/2020 and underwent a heart transplant on 5/18/2020.  Pt's hx is significant for type 2 diabetes mellitus dx 5 + years ago, HTN, hyperlipidemia, hx of ischemic heart disease s/p STEMI with ALYSSA 2019, LVAD placement, Stage 3 CKD, and obesity.  Red was also admitted 5/12-5/15/2020 for ANDREW due to nephrolithiasis complicated by hydronephrosis/ANDREW.  For his diabetes, pt is taking Ozempic 0.5 mg subcutaneous once a week ( 3rd dose this week ),  Lantus 36 units subcutaneous each pm and Humalog 1 unit/5 gms CHO with meals with correction scale  ( 1 unit/25 for BG > 150 ) and bedtime correction scale ( 1 unit/25 for BG > 200).   He is also taking Farxiga 10 mg each am.  Most recent A1C was 6.9 % yesterday ( 6/14/2023 ) at his PCP's office.  Pt's A1C was 7.4 5 on 5/11/2023.  His insurance will no longer cover his DexcomG6 sensor supplies, so he is not using his Dexcom sensor.  Red is now using a Freestyle Libre2 sensor to monitor his blood sugars.  His sensor download is below and looks  good.  Pt's blood sugar is in target 70 % of he time with no hypoglycemia.  On ROS today, mild tingling in both feet. Denies foot ulcers/sores.  Pt denies blurred vision, n/v, SOB at rest,cough,fever,chest pain, abd pain, diarrhea, dysuria or hematuria.    Diabetes Care  Retinopathy:none; pt seen by Oph in Jan 2023 with no retinopathy per patient.  Nephropathy:yes- hx of CKD/ANDREW.  Neuropathy: mild tingling in both feet.  Foot Exam:no exam today.  Taking aspirin: yes.  Lipids: LDL 61 in 5/2023.  Pt is taking Crestor.  CAD: yes; s/p heart transplant on 5/18/2020.  Depression: no.  Insulin: Basal and meal time insulin with correction scale ( 1 unit/25 for BG > 150 with meals and > 200 at bedtime).    DM meds: Farxiga and Ozempic today.  Testing: Freestyle Libre2 sensor.          ROS  See under HPI.    Allergies  No Known Allergies    Medications  Current Outpatient Medications   Medication Sig Dispense Refill    ACCU-CHEK [place]LOTTE PLUS test strip Check BG 3 times daily at meals and at bedtime. 400 strip 3    acetaminophen (TYLENOL) 325 MG tablet Take 2 tablets (650 mg) by mouth every 4 hours as needed for other (multimodal surgical pain management along with NSAIDS and opioid medication as indicated based on pain control and physical function.)      amLODIPine (NORVASC) 10 MG tablet Take 1 tablet (10 mg) by mouth daily 90 tablet 3    aspirin (ASA) 81 MG EC tablet Take 1 tablet (81 mg) by mouth daily 30 tablet 11    BD SILVANA U/F 32G X 4 MM insulin pen needle Use 6 times daily. 600 each 3    calcium carbonate 600 mg-vitamin D 400 units (CALTRATE) 600-400 MG-UNIT per tablet Take 1 tablet by mouth 2 times daily (with meals)      Continuous Blood Gluc Sensor (FREESTYLE DARRELL 2 SENSOR) MISC 1 each every 14 days Use 1 sensor every 14 days. Use to read blood sugars per 's instructions. 2 each 5    dapagliflozin (FARXIGA) 10 MG TABS tablet Take 1 tablet (10 mg) by mouth daily 90 tablet 3    everolimus (ZORTRESS) 0.5 MG  tablet Take 3 tablets (1.5 mg) by mouth every morning AND 2 tablets (1 mg) every evening. 450 tablet 3    HUMALOG KWIKPEN 100 UNIT/ML soln INJECT ONE UNIT PER EVERY 5 GRAMS OF CARBS WITH MEALS AND SNACKS, PLUS CORRECTION, APPROX 70 UNITS IN 24 HOURS 75 mL 3    hydrALAZINE (APRESOLINE) 50 MG tablet Take 2.5 tablets (125 mg) by mouth 3 times daily 675 tablet 3    insulin glargine (LANTUS PEN) 100 UNIT/ML pen Inject 36 units subcutaneous at hs. 15 mL 3    loperamide (IMODIUM) 2 MG capsule Take 1 capsule (2 mg) by mouth 4 times daily as needed for diarrhea 60 capsule 0    magnesium oxide (MAG-OX) 400 (241.3 Mg) MG tablet Take 1 tablet (400 mg) by mouth daily 90 tablet 3    multivitamin w/minerals (THERA-VIT-M) tablet Take 1 tablet by mouth daily 90 tablet 3    mycophenolate (GENERIC EQUIVALENT) 250 MG capsule Take 5 capsules (1,250 mg) by mouth 2 times daily 300 capsule 11    OZEMPIC, 1 MG/DOSE, 4 MG/3ML pen       pantoprazole (PROTONIX) 40 MG EC tablet Take 1 tablet (40 mg) by mouth every morning (before breakfast) 90 tablet 3    potassium chloride ER (KLOR-CON M) 20 MEQ CR tablet Take 1 tablet (20 mEq) by mouth daily 90 tablet 3    rosuvastatin (CRESTOR) 20 MG tablet Take 1 tablet (20 mg) by mouth daily 90 tablet 3    torsemide (DEMADEX) 20 MG tablet Take 1 tablet (20 mg) by mouth daily 90 tablet 3       Family History  Mother and father with hx of type 2 DM.  Social History   reports that he has never smoked. He has never used smokeless tobacco. He reports that he does not drink alcohol and does not use drugs.     Past Medical History  Past Medical History:   Diagnosis Date    Acute kidney injury (H)     CKD (chronic kidney disease)     Coronary artery disease     Diabetes mellitus (H)     HTN (hypertension)     Hyperlipidemia     ICD (implantable cardioverter-defibrillator), single chamber- NOT dependent 7/17/2018    Ischemic cardiomyopathy     LV (left ventricular) mural thrombus     Paroxysmal atrial flutter (H)      RVF (right ventricular failure) (H)     Systolic heart failure (H)     Ventricular tachycardia (H)        Past Surgical History:   Procedure Laterality Date    BRONCHOSCOPY (RIGID OR FLEXIBLE), DIAGNOSTIC N/A 9/15/2020    Procedure: BRONCHOSCOPY, WITH BRONCHOALVEOLAR LAVAGE;  Surgeon: Elder Mejia MD;  Location:  GI    BRONCHOSCOPY (RIGID OR FLEXIBLE), DIAGNOSTIC N/A 9/17/2020    Procedure: BRONCHOSCOPY, WITH BRONCHOALVEOLAR LAVAGE;  Surgeon: Bill Lemus MD;  Location: [facility] OR    BRONCHOSCOPY RIGID OR FLEXIBLE W/TRANSENDOSCOPIC ENDOBRONCHIAL ULTRASOUND GUIDED Right 9/8/2020    Procedure: BRONCHOSCOPY, WITH ENDOBRONCHIAL ULTRASOUND;  Surgeon: Donny Mercedes MD;  Location:  GI    COLONOSCOPY N/A 12/7/2018    Procedure: COLONOSCOPY;  Surgeon: Krish Mercado MD;  [facility]    CV CORONARY ANGIOGRAM N/A 6/23/2021    Procedure: CV CORONARY ANGIOGRAM;  Surgeon: Brian Long MD;  Location:  HEART CARDIAC CATH LAB    CV CORONARY ANGIOGRAM N/A 5/18/2022    Procedure: Coronary Angiogram [[id number]];  Surgeon: Khris Mcclelland MD;  Location:  HEART CARDIAC CATH LAB    CV CORONARY ANGIOGRAM N/A 5/11/2023    Procedure: Coronary Angiogram;  Surgeon: Khris Mcclelland MD;  Location:  HEART CARDIAC CATH LAB    CV HEART BIOPSY N/A 5/24/2020    Procedure: Heart Biopsy;  Surgeon: Kit Bacon MD;  Location:  HEART CARDIAC CATH LAB    CV HEART BIOPSY N/A 6/1/2020    Procedure: CV HEART BIOPSY;  Surgeon: Kit Bacon MD;  Location:  HEART CARDIAC CATH LAB    CV HEART BIOPSY N/A 6/8/2020    Procedure: CV HEART BIOPSY;  Surgeon: Kit Bacon MD;  Location:  HEART CARDIAC CATH LAB    CV HEART BIOPSY N/A 6/15/2020    Procedure: CV HEART BIOPSY;  Surgeon: Kit Bacon MD;  Location:  HEART CARDIAC CATH LAB    CV HEART BIOPSY N/A 6/30/2020    Procedure: CV HEART BIOPSY;  Surgeon: Kit Bacon MD;   [facility]: [facility]    CV HEART BIOPSY N/A 7/14/2020    Procedure: CV HEART BIOPSY;  Surgeon: Brian Long MD;  Location: [facility]AC C[facility] LAB    CV HEART BIOPSY N/A 7/29/2020    Procedure: CV HEART BIOPSY;  Surgeon: Momo Hunt MD;  Location: [facility]ARD[facility]AC CATH LAB    CV HEART BIOPSY N/A 8/13/2020    Procedure: CV HEART BIOPSY;  Surgeon: Khris Mcclelland MD;  [facility]: [facility] C[facility]    CV HEART BIOPSY N/A 8/26/2020    Procedure: CV HEART BIOPSY;  Surgeon: Momo Hunt MD;  [facility]    CV HEART BIOPSY N/A 9/30/2020    Procedure: CV HEART BIOPSY;  Surgeon: Artemio Ulloa MD;  [facility]: [facility]    CV HEART BIOPSY N/A 10/29/2020    Procedure: CV HEART BIOPSY;  Surgeon: Kit Bacon MD;  Location:  HEART CARDIAC CATH LAB    CV HEART BIOPSY N/A 1/5/2021    Procedure: CV HEART BIOPSY;  Surgeon: Carlin Garcia MD;  [facility]    CV HEART BIOPSY N/A 6/23/2021    Procedure: CV HEART BIOPSY;  Surgeon: Brian Long MD;  Location:  HEART CARDIAC CATH LAB    CV HEART BIOPSY N/A 10/26/2021    Procedure: CV HEART BIOPSY;  Surgeon: Kit Bacon MD;  Location:  HEART CARDIAC CATH LAB    CV HEART BIOPSY N/A 5/18/2022    Procedure: Heart Biopsy;  Surgeon: Khris Mcclelland MD;  Location:  HEART CARDIAC CATH LAB    CV HEART BIOPSY N/A 5/11/2023    Procedure: Heart Biopsy;  Surgeon: Khris Mcclelland MD;  Location:  HEART CARDIAC CATH LAB    CV RIGHT HEART CATH MEASUREMENTS RECORDED N/A 2/19/2019    Procedure: RHC;  Surgeon: rBian Long MD;  Location:  HEART CARDIAC CATH LAB    CV RIGHT HEART CATH MEASUREMENTS RECORDED N/A 7/5/2019    Procedure: CV RIGHT HEART CATH;  Surgeon: Khris Mcclelland MD;  [facility]    CV RIGHT HEART CATH MEASUREMENTS RECORDED N/A 5/24/2020    Procedure: Right Heart  Cath;  Surgeon: Kit Bacon MD;  Location:  HEART CARDIAC CATH LAB    CV RIGHT HEART CATH MEASUREMENTS RECORDED N/A 6/1/2020    Procedure: CV RIGHT HEART CATH;  Surgeon: Kit Bacon MD;  Location:  HEART CARDIAC CATH LAB    CV RIGHT HEART CATH MEASUREMENTS RECORDED N/A 6/8/2020    Procedure: CV RIGHT HEART CATH;  Surgeon: Kit Bacon MD;  Location:  HEART CARDIAC CATH LAB    CV RIGHT HEART CATH MEASUREMENTS RECORDED N/A 6/15/2020    Procedure: CV RIGHT HEART CATH;  Surgeon: Kit Bacon MD;  Location:  HEART CARDIAC CATH LAB    CV RIGHT HEART CATH MEASUREMENTS RECORDED N/A 6/30/2020    Procedure: CV RIGHT HEART CATH;  Surgeon: Kit Bacon MD;  Location:  HEART CARDIAC CATH LAB    CV RIGHT HEART CATH MEASUREMENTS RECORDED N/A 7/14/2020    Procedure: CV RIGHT HEART CATH;  Surgeon: Brian Long MD;  Location:  HEART CARDIAC CATH LAB    CV RIGHT HEART CATH MEASUREMENTS RECORDED N/A 7/29/2020    Procedure: CV RIGHT HEART CATH;  Surgeon: Momo Hunt MD;  Location:  HEART CARDIAC CATH LAB    CV RIGHT HEART CATH MEASUREMENTS RECORDED N/A 8/13/2020    Procedure: CV RIGHT HEART CATH;  Surgeon: Khris Mcclelland MD;  Location:  HEART CARDIAC CATH LAB    CV RIGHT HEART CATH MEASUREMENTS RECORDED N/A 8/26/2020    Procedure: CV RIGHT HEART CATH;  Surgeon: Momo Hunt MD;  Location:  HEART CARDIAC CATH LAB    CV RIGHT HEART CATH MEASUREMENTS RECORDED N/A 9/30/2020    Procedure: Right Heart Cath;  Surgeon: Artemio Ulloa MD;  Location:  HEART CARDIAC CATH LAB    CV RIGHT HEART CATH MEASUREMENTS RECORDED N/A 10/29/2020    Procedure: CV RIGHT HEART CATH;  Surgeon: Kit Bacon MD;  Location:  HEART CARDIAC CATH LAB    CV RIGHT HEART CATH MEASUREMENTS RECORDED N/A 1/5/2021    Procedure: CV RIGHT HEART CATH;  Surgeon: Carlin Garcia MD;  Location: [facility] CARDIAC  CATH LAB    CV RIGHT HEART CATH MEASUREMENTS RECORDED N/A 6/23/2021    Procedure: CV RIGHT HEART CATH;  Surgeon: Brian Long MD;  Location:  HEART CARDIAC CATH LAB    CV RIGHT HEART CATH MEASUREMENTS RECORDED N/A 10/26/2021    Procedure: CV RIGHT HEART CATH;  Surgeon: Kit Bacon MD;  Location:  HEART CARDIAC CATH LAB    CV RIGHT HEART CATH MEASUREMENTS RECORDED N/A 5/18/2022    Procedure: Right Heart Catheterization;  Surgeon: Khris Mcclelland MD;  Location:  HEART CARDIAC CATH LAB    CV RIGHT HEART CATH MEASUREMENTS RECORDED N/A 5/11/2023    Procedure: Right Heart Catheterization;  Surgeon: Khris Mcclelland MD;  Location:  HEART CARDIAC CATH LAB    ENDOBRONCHIAL ULTRASOUND FLEXIBLE N/A 9/17/2020    Procedure: BRONCHOSCOPY, flexible, endobronchial ultrasound with transbronchial biopsies, endobronchial biopsies;  Surgeon: Bill Lemus MD;  [facility]     PRQ [id number] CORONARY ANGIOPLASTY ADDL BRANCH      PCI and ALYSSA to ostial LAD on 6/27/18    IMPLANT AUTOMATIC IMPLANTABLE CARDIOVERTER DEFIBRILLATOR      INSERT VENTRICULAR ASSIST DEVICE LEFT (HEARTMATE II) N/A 12/31/2018    Procedure: Median Sternotomy, On Cardiopulmonary Bypass Pump, Insertion of Left Ventricular Assist Device (Heartmate III);  Surgeon: Kamaljit Baker MD;  [facility]    PICC DOUBLE LUMEN PLACEMENT Right 05/22/2020    5FR DL PICC inserted at right basilic vein, length 38cm.    TRANSPLANT HEART RECIPIENT AFTER HEART MATE N/A 5/18/2020    Procedure: REDO MEDIAN STERNOTOMY, LYSIS OF ADHESIONS, ON CARDIOPULMONARY BYPASS PUMP, HEART TRANSPLANT RECIPIENT, REMOVAL OF LEFT VENTRICULAR ASSIST DEVICE, REMOVAL OF AUTOMATED IMPLANTABLE CARDIOVERTER DEFIBRILLATOR;  Surgeon: Elder Willis MD;  [facility]       Physical Exam    No exam today.    RESULTS  Creatinine   Date Value Ref Range Status   05/11/2023 1.75 (H) 0.67 - 1.17 mg/dL Final   06/23/2021 1.93 (H) 0.66 - 1.25 mg/dL Final      GFR Estimate   Date Value Ref Range Status   05/11/2023 44 (L) >60 mL/min/1.73m2 Final     Comment:     eGFR calculated using 2021 CKD-EPI equation.   06/23/2021 37 (L) >60 mL/min/[1.73_m2] Final     Comment:     Non  GFR Calc  Starting 12/18/2018, serum creatinine based estimated GFR (eGFR) will be   calculated using the Chronic Kidney Disease Epidemiology Collaboration   (CKD-EPI) equation.       Hemoglobin A1C   Date Value Ref Range Status   05/11/2023 7.4 (H) <5.7 % Final     Comment:     Normal <5.7%   Prediabetes 5.7-6.4%    Diabetes 6.5% or higher     Note: Adopted from ADA consensus guidelines.   06/23/2021 10.3 (H) 0 - 5.6 % Final     Comment:     Normal <5.7% Prediabetes 5.7-6.4%  Diabetes 6.5% or higher - adopted from ADA   consensus guidelines.       Potassium   Date Value Ref Range Status   05/11/2023 3.8 3.4 - 5.3 mmol/L Final   05/18/2022 3.5 3.4 - 5.3 mmol/L Final   06/23/2021 3.6 3.4 - 5.3 mmol/L Final     ALT   Date Value Ref Range Status   05/11/2023 19 10 - 50 U/L Final   06/23/2021 26 0 - 70 U/L Final     AST   Date Value Ref Range Status   05/11/2023 23 10 - 50 U/L Final   06/23/2021 22 0 - 45 U/L Final     TSH   Date Value Ref Range Status   03/03/2021 1.87 0.40 - 4.00 mU/L Final     T4 Free   Date Value Ref Range Status   03/03/2021 1.08 0.76 - 1.46 ng/dL Final       Cholesterol   Date Value Ref Range Status   05/11/2023 153 <200 mg/dL Final   02/15/2023 186 <200 mg/dL Final   06/23/2021 168 <200 mg/dL Final   10/29/2020 161 <200 mg/dL Final     HDL Cholesterol   Date Value Ref Range Status   06/23/2021 49 >39 mg/dL Final   10/29/2020 51 >39 mg/dL Final     Direct Measure HDL   Date Value Ref Range Status   05/11/2023 36 (L) >=40 mg/dL Final   02/15/2023 39 (L) >=40 mg/dL Final     LDL Cholesterol Calculated   Date Value Ref Range Status   05/11/2023 61 <=100 mg/dL Final   02/15/2023 106 (H) <=100 mg/dL Final   06/23/2021 75 <100 mg/dL Final     Comment:     Desirable:        <100 mg/dl   10/29/2020 81 <100 mg/dL Final     Comment:     Desirable:       <100 mg/dl     LDL Cholesterol Direct   Date Value Ref Range Status   10/26/2021 105 (H) <100 mg/dL Final     Comment:     Age 0-19 years:  Desirable: 0-110 mg/dL   Borderline high: 110-129 mg/dL   High: >= 130 mg/dL    Age 20 years and older:  Desirable: <100mg/dL  Above desirable: 100-129 mg/dL   Borderline high: 130-159 mg/dL   High: 160-189 mg/dL   Very high: >= 190 mg/dL     Triglycerides   Date Value Ref Range Status   05/11/2023 280 (H) <150 mg/dL Final   02/15/2023 206 (H) <150 mg/dL Final   06/23/2021 220 (H) <150 mg/dL Final     Comment:     Borderline high:  150-199 mg/dl  High:             200-499 mg/dl  Very high:       >499 mg/dl     10/29/2020 144 <150 mg/dL Final       ASSESSMENT/PLAN:      1.  TYPE 2 DIABETES MELLITUS: Glycemic control has improved with addition of Ozempic.  Increase Ozempic 1 mg subcutaneous once a week once he has taken Ozempic 0.5 mg once a week x 1 month.  If he has blood sugar values < 70 he is to notify me. He is aware that he may need less Humalog once starts taking Ozempic 1 mg subcutaneous once a week.  For now, continue Lantus 36 units subcutaneous at hs and continue Humalog 1 unit/5 gms CHO with meals and snacks,plus correction insulin 1 unit/25 for BG > 140 with meals and > 200 at bedtime.  Continue Farxiga 10 mg daily.    Again, I reviewed how Farxiga works and possible side effects including dehydration, yeast infection and possible UTI.   Reminded him to drink plenty of water.  Avoid use of Metformin due to CKD and lower GFR.  Continue to use Freestyle Libre2 sensor to monitor blood sugars.  Pt was seen by Oph in Jan 2023 without retinopathy per patient.  He reports mild tingling in his feet.  No foot ulcers at this time.  He is taking ASA daily.    2.  CKD/ANDREW: Creat 1.80 with GFR 43 mL/min on 6/1/2023.  Avoid use of Metformin.  Pt taking Farxiga.    3.  HX OF ISCHEMIC CARDIOMYOPATHY: S/P  heart transplant on 5/18/2020 and followed closely by transplant staff here.    4.  HTN: /80 on 5/11/2023.  Continue current RXs.    5.  WEIGHT GAIN: Some weight loss with addition of Ozempic.  Reminded patient to make healthy food choices, reduce food portions with meals, try to avoid snacking and increase activity.     6  FOLLOW UP : with me in 2 months.  Ozempic 1 mg pen ordered today.    Time spent reviewing chart, labs and glucose data today = 8 minutes.  Time for video visit today = 16 minutes.  Time for documentation today = 15 minutes.    TOTAL TIME FOR VIDEO VISIT TODAY= 39  minutes.    Jeannette Schafer PA-C     Answers for HPI/ROS submitted by the patient on 6/12/2023  General Symptoms: No  Skin Symptoms: No  HENT Symptoms: No  EYE SYMPTOMS: No  HEART SYMPTOMS: No  LUNG SYMPTOMS: No  INTESTINAL SYMPTOMS: No  URINARY SYMPTOMS: No  REPRODUCTIVE SYMPTOMS: No  SKELETAL SYMPTOMS: No  BLOOD SYMPTOMS: No  NERVOUS SYSTEM SYMPTOMS: No  MENTAL HEALTH SYMPTOMS: No

## 2023-08-04 NOTE — PROGRESS NOTES
utcome for 08/04/23 11:26 AM: Data uploaded on Osiris Beaulieu MA  Outcome for 08/09/23 3:15 PM: Data obtained via [employer] website  Paola Beaulieu MA      Patient is showing 5/5 MNCM met.   Paola Beaulieu MA

## 2023-08-11 ENCOUNTER — TELEPHONE (OUTPATIENT)
Dept: ENDOCRINOLOGY | Facility: CLINIC | Age: 63
End: 2023-08-11

## 2023-08-11 ENCOUNTER — VIRTUAL VISIT (OUTPATIENT)
Dept: ENDOCRINOLOGY | Facility: CLINIC | Age: 63
End: 2023-08-11
Payer: COMMERCIAL

## 2023-08-11 DIAGNOSIS — Z94.1 STATUS POST HEART TRANSPLANTATION (H): ICD-10-CM

## 2023-08-11 DIAGNOSIS — E11.65 TYPE 2 DIABETES MELLITUS WITH HYPERGLYCEMIA, WITH LONG-TERM CURRENT USE OF INSULIN (H): ICD-10-CM

## 2023-08-11 DIAGNOSIS — Z79.4 TYPE 2 DIABETES MELLITUS WITH HYPERGLYCEMIA, WITH LONG-TERM CURRENT USE OF INSULIN (H): ICD-10-CM

## 2023-08-11 PROCEDURE — 99214 OFFICE O/P EST MOD 30 MIN: CPT | Mod: VID | Performed by: PHYSICIAN ASSISTANT

## 2023-08-11 RX ORDER — INSULIN LISPRO 100 [IU]/ML
INJECTION, SOLUTION INTRAVENOUS; SUBCUTANEOUS
Qty: 75 ML | Refills: 3 | Status: SHIPPED | OUTPATIENT
Start: 2023-08-11 | End: 2024-10-02

## 2023-08-11 NOTE — LETTER
8/11/2023       RE: Mariusz Sharp  [address] [facility] [address]     Dear Colleague,    Thank you for referring your patient, Mariusz Sharp, to the [facility] ENDOCRINOLOGY CLINIC [place] at [facility]. Please see a copy of my visit note below.    utcome for 08/04/23 11:26 AM: Data uploaded on [employer]  Paola Beaulieu MA  Outcome for 08/09/23 3:15 PM: Data obtained via [employer] website  Paola Beaulieu MA      Patient is showing 5/5 [place] met.   Paola Beaulieu MA                  Time of start: 3:30 pm  Time of end: 3:43 pm   Total duration of video visit: 13 minutes.  Providers location: offsite.  Patients location: [place].    [facility]  Mariusz ( Red ) ISABEL Sharp is a 62 year old male with type 2 diabetes mellitus. Video visit today for diabetes care.  Pt was admitted 5/17/2020-5/29/2020 and underwent a heart transplant on 5/18/2020.  Pt's hx is significant for type 2 diabetes mellitus dx 5 + years ago, HTN, hyperlipidemia, hx of ischemic heart disease s/p STEMI with ALYSSA 2019, LVAD placement, Stage 3 CKD, and obesity.  Red was also admitted 5/12-5/15/2020 for ANDREW due to nephrolithiasis complicated by hydronephrosis/ANDREW.  For his diabetes, pt is taking Ozempic 1 mg subcutaneous once a week, Lantus 36 units subcutaneous each pm and Humalog 1 unit/5 gms CHO with meals with correction scale  ( 1 unit/25 for BG > 150 ) and bedtime correction scale ( 1 unit/25 for BG > 200).   He is also taking Farxiga 10 mg each am.  Most recent A1C was 6.9 % yesterday ( 6/14/2023 ) at his PCP's office.  Pt's A1C was 7.4 % on 5/11/2023.  Red is now using a Freestyle Libre2 sensor to monitor his blood sugars.  His sensor download is below .   He has been having low blood sugars.  Pt's blood sugar is in target 85% of he time.  On ROS today, some weight loss.  Mild tingling in both feet. Denies foot ulcers/sores.  Pt denies blurred vision, n/v, SOB at rest,cough,fever,chest pain, abd  pain, diarrhea, dysuria or hematuria.    Diabetes Care  Retinopathy:none; pt seen by Oph in Jan 2023 with no retinopathy per patient.  Nephropathy:yes- hx of CKD/ANDREW.  Neuropathy: mild tingling in both feet.  Foot Exam:no exam today.  Taking aspirin: yes.  Lipids: LDL 61 in 5/2023.  Pt is taking Crestor.  CAD: yes; s/p heart transplant on 5/18/2020.  Depression: no.  Insulin: Basal and meal time insulin with correction scale ( 1 unit/25 for BG > 150 with meals and > 200 at bedtime).    DM meds: Farxiga and Ozempic.  Testing: Freestyle Libre2 sensor.          ROS  See under HPI.    Allergies  No Known Allergies    Medications  Current Outpatient Medications   Medication Sig Dispense Refill    ACCU-CHEK [place]LOTTE PLUS test strip Check BG 3 times daily at meals and at bedtime. 400 strip 3    acetaminophen (TYLENOL) 325 MG tablet Take 2 tablets (650 mg) by mouth every 4 hours as needed for other (multimodal surgical pain management along with NSAIDS and opioid medication as indicated based on pain control and physical function.)      amLODIPine (NORVASC) 10 MG tablet Take 1 tablet (10 mg) by mouth daily 90 tablet 3    aspirin (ASA) 81 MG EC tablet Take 1 tablet (81 mg) by mouth daily 30 tablet 11    BD SILVANA U/F 32G X 4 MM insulin pen needle Use 6 times daily. 600 each 3    calcium carbonate 600 mg-vitamin D 400 units (CALTRATE) 600-400 MG-UNIT per tablet Take 1 tablet by mouth 2 times daily (with meals)      Continuous Blood Gluc Sensor (FREESTYLE DARRELL 2 SENSOR) [facility] 1 each every 14 days Use 1 sensor every 14 days. Use to read blood sugars per 's instructions. 2 each 5    dapagliflozin (FARXIGA) 10 MG TABS tablet Take 1 tablet (10 mg) by mouth daily 90 tablet 3    everolimus (ZORTRESS) 0.5 MG tablet Take 3 tablets (1.5 mg) by mouth every morning AND 2 tablets (1 mg) every evening. 450 tablet 3    HUMALOG KWIKPEN 100 UNIT/ML soln INJECT ONE UNIT PER EVERY 5 GRAMS OF CARBS WITH MEALS AND SNACKS, PLUS CORRECTION,  APPROX 70 UNITS IN 24 HOURS 75 mL 3    hydrALAZINE (APRESOLINE) 50 MG tablet Take 2.5 tablets (125 mg) by mouth 3 times daily 675 tablet 3    insulin glargine (LANTUS PEN) 100 UNIT/ML pen Inject 36 units subcutaneous at hs. 15 mL 3    loperamide (IMODIUM) 2 MG capsule Take 1 capsule (2 mg) by mouth 4 times daily as needed for diarrhea 60 capsule 0    magnesium oxide (MAG-OX) 400 (241.3 Mg) MG tablet Take 1 tablet (400 mg) by mouth daily 90 tablet 3    multivitamin w/minerals (THERA-VIT-M) tablet Take 1 tablet by mouth daily 90 tablet 3    mycophenolate (GENERIC EQUIVALENT) 250 MG capsule Take 5 capsules (1,250 mg) by mouth 2 times daily 300 capsule 11    OZEMPIC, 1 MG/DOSE, 4 MG/3ML pen Inject 1 mg subcutaneous once a week once you have taken 0.5 mg subcutaneous weekly x 1 month. 15 mL 1    pantoprazole (PROTONIX) 40 MG EC tablet Take 1 tablet (40 mg) by mouth every morning (before breakfast) 90 tablet 3    potassium chloride ER (KLOR-CON M) 20 MEQ CR tablet Take 1 tablet (20 mEq) by mouth daily 90 tablet 3    rosuvastatin (CRESTOR) 20 MG tablet Take 1 tablet (20 mg) by mouth daily 90 tablet 3    torsemide (DEMADEX) 20 MG tablet Take 1 tablet (20 mg) by mouth daily 90 tablet 3       Family History  Mother and father with hx of type 2 DM.  Social History   reports that he has never smoked. He has never used smokeless tobacco. He reports that he does not drink alcohol and does not use drugs.     Past Medical History  Past Medical History:   Diagnosis Date    Acute kidney injury (H)     CKD (chronic kidney disease)     Coronary artery disease     Diabetes mellitus (H)     HTN (hypertension)     Hyperlipidemia     ICD (implantable cardioverter-defibrillator), single chamber- NOT dependent 7/17/2018    Ischemic cardiomyopathy     LV (left ventricular) mural thrombus     Paroxysmal atrial flutter (H)     RVF (right ventricular failure) (H)     Systolic heart failure (H)     Ventricular tachycardia (H)        Past Surgical  History:   Procedure Laterality Date    BRONCHOSCOPY (RIGID OR FLEXIBLE), DIAGNOSTIC N/A 9/15/2020    Procedure: BRONCHOSCOPY, WITH BRONCHOALVEOLAR LAVAGE;  Surgeon: Elder Mejia MD;  Location:  GI    BRONCHOSCOPY (RIGID OR FLEXIBLE), DIAGNOSTIC N/A 9/17/2020    Procedure: BRONCHOSCOPY, WITH BRONCHOALVEOLAR LAVAGE;  Surgeon: Bill Lemus MD;  [facility]    BRONCHOSCOPY RIGID OR FLEXIBLE W/TRANSENDOSCOPIC ENDOBRONCHIAL ULTRASOUND GUIDED Right 9/8/2020    Procedure: BRONCHOSCOPY, WITH ENDOBRONCHIAL ULTRASOUND;  Surgeon: Donny Mercedes MD;  [facility]: [facility] GI    COLONOSCOPY N/A 12/7/2018    Procedure: COLONOSCOPY;  Surgeon: Krish Mercado MD;  [facility]    CV CORONARY ANGIOGRAM N/A 6/23/2021    Procedure: CV CORONARY ANGIOGRAM;  Surgeon: Brian Long MD;  Location:  HEART CARDIAC CATH LAB    CV CORONARY ANGIOGRAM N/A 5/18/2022    Procedure: Coronary Angiogram [[id number]];  Surgeon: Khris Mcclelland MD;  Location:  HEART CARDIAC CATH LAB    CV CORONARY ANGIOGRAM N/A 5/11/2023    Procedure: Coronary Angiogram;  Surgeon: Khris Mcclelland MD;  Location:  HEART CARDIAC CATH LAB    CV HEART BIOPSY N/A 5/24/2020    Procedure: Heart Biopsy;  Surgeon: Kit Bacon MD;  Location:  HEART CARDIAC CATH LAB    CV HEART BIOPSY N/A 6/1/2020    Procedure: CV HEART BIOPSY;  Surgeon: Kit Bacon MD;  Location:  HEART CARDIAC CATH LAB    CV HEART BIOPSY N/A 6/8/2020    Procedure: CV HEART BIOPSY;  Surgeon: Kit Bacon MD;  Location:  HEART CARDIAC CATH LAB    CV HEART BIOPSY N/A 6/15/2020    Procedure: CV HEART BIOPSY;  Surgeon: Kit Bacon MD;  Location:  HEART CARDIAC CATH LAB    CV HEART BIOPSY N/A 6/30/2020    Procedure: CV HEART BIOPSY;  Surgeon: Kit Bacon MD;  Location:  HEART CARDIAC CATH LAB    CV HEART BIOPSY N/A 7/14/2020    Procedure: CV HEART BIOPSY;  Surgeon: Mercedes  MD Brian;  [facility]    CV HEART BIOPSY N/A 7/29/2020    Procedure: CV HEART BIOPSY;  Surgeon: Momo Hunt MD;  [facility]    CV HEART BIOPSY N/A 8/13/2020    Procedure: CV HEART BIOPSY;  Surgeon: Khris Mcclelland MD;  [facility]    CV HEART BIOPSY N/A 8/26/2020    Procedure: CV HEART BIOPSY;  Surgeon: Momo Hunt MD;  [facility]    CV HEART BIOPSY N/A 9/30/2020    Procedure: CV HEART BIOPSY;  Surgeon: Artemio Ulloa MD;  [facility]    CV HEART BIOPSY N/A 10/29/2020    Procedure: CV HEART BIOPSY;  Surgeon: Kit Bacon MD;  [facility]    CV HEART BIOPSY N/A 1/5/2021    Procedure: CV HEART BIOPSY;  Surgeon: Carlin Garcia MD;  [facility]    CV HEART BIOPSY N/A 6/23/2021    Procedure: CV HEART BIOPSY;  Surgeon: Brian Long MD;  [facility]    CV HEART BIOPSY N/A 10/26/2021    Procedure: CV HEART BIOPSY;  Surgeon: Kit Bacon MD;  [facility]    CV HEART BIOPSY N/A 5/18/2022    Procedure: Heart Biopsy;  Surgeon: Khris Mcclelland MD;  [facility]    CV HEART BIOPSY N/A 5/11/2023    Procedure: Heart Biopsy;  Surgeon: Khris Mcclelland MD;  [facility]    CV RIGHT HEART CATH MEASUREMENTS RECORDED N/A 2/19/2019    Procedure: RHC;  Surgeon: Brian Long MD;  [facility]ARD[facility]    CV RIGHT HEART CATH MEASUREMENTS RECORDED N/A 7/5/2019    Procedure: CV RIGHT HEART CATH;  Surgeon: Khris Mcclelland MD;  [facility]    CV RIGHT HEART CATH MEASUREMENTS RECORDED N/A 5/24/2020    Procedure: Right Heart Cath;  Surgeon: Kit Bacon MD;  Location:  HEART CARDIAC CATH LAB    CV RIGHT HEART CATH MEASUREMENTS  RECORDED N/A 6/1/2020    Procedure: CV RIGHT HEART CATH;  Surgeon: Kit Bacon MD;  Location:  HEART CARDIAC CATH LAB    CV RIGHT HEART CATH MEASUREMENTS RECORDED N/A 6/8/2020    Procedure: CV RIGHT HEART CATH;  Surgeon: Kit Bacon MD;  [facility]    CV RIGHT HEART CATH MEASUREMENTS RECORDED N/A 6/15/2020    Procedure: CV RIGHT HEART CATH;  Surgeon: Kit Bacon MD;  [facility]    CV RIGHT HEART CATH MEASUREMENTS RECORDED N/A 6/30/2020    Procedure: CV RIGHT HEART CATH;  Surgeon: Kit Bacon MD;  Location:  HEART CARDIAC CATH LAB    CV RIGHT HEART CATH MEASUREMENTS RECORDED N/A 7/14/2020    Procedure: CV RIGHT HEART CATH;  Surgeon: Brian Long MD;  Location:  HEART CARDIAC CATH LAB    CV RIGHT HEART CATH MEASUREMENTS RECORDED N/A 7/29/2020    Procedure: CV RIGHT HEART CATH;  Surgeon: Momo Hunt MD;  Location:  HEART CARDIAC CATH LAB    CV RIGHT HEART CATH MEASUREMENTS RECORDED N/A 8/13/2020    Procedure: CV RIGHT HEART CATH;  Surgeon: Khris Mcclelland MD;  Location:  HEART CARDIAC CATH LAB    CV RIGHT HEART CATH MEASUREMENTS RECORDED N/A 8/26/2020    Procedure: CV RIGHT HEART CATH;  Surgeon: Momo Hunt MD;  Location:  HEART CARDIAC CATH LAB    CV RIGHT HEART CATH MEASUREMENTS RECORDED N/A 9/30/2020    Procedure: Right Heart Cath;  Surgeon: Artemio Ulloa MD;  Location:  HEART CARDIAC CATH LAB    CV RIGHT HEART CATH MEASUREMENTS RECORDED N/A 10/29/2020    Procedure: CV RIGHT HEART CATH;  Surgeon: Kit Bacon MD;  Location:  HEART CARDIAC CATH LAB    CV RIGHT HEART CATH MEASUREMENTS RECORDED N/A 1/5/2021    Procedure: CV RIGHT HEART CATH;  Surgeon: Carlin Garcia MD;  Location:  HEART CARDIAC CATH LAB    CV RIGHT HEART CATH MEASUREMENTS RECORDED N/A 6/23/2021    Procedure: CV RIGHT HEART CATH;  Surgeon: Mercedes  MD Brian;  Location:  HEART CARDIAC CATH LAB    CV RIGHT HEART CATH MEASUREMENTS RECORDED N/A 10/26/2021    Procedure: CV RIGHT HEART CATH;  Surgeon: Kit Bacon MD;  Location:  HEART CARDIAC CATH LAB    CV RIGHT HEART CATH MEASUREMENTS RECORDED N/A 5/18/2022    Procedure: Right Heart Catheterization;  Surgeon: Khris Mcclelland MD;  Location:  HEART CARDIAC CATH LAB    CV RIGHT HEART CATH MEASUREMENTS RECORDED N/A 5/11/2023    Procedure: Right Heart Catheterization;  Surgeon: Khris Mcclelland MD;  Location:  HEART CARDIAC CATH LAB    ENDOBRONCHIAL ULTRASOUND FLEXIBLE N/A 9/17/2020    Procedure: BRONCHOSCOPY, flexible, endobronchial ultrasound with transbronchial biopsies, endobronchial biopsies;  Surgeon: Bill Lemus MD;  [facility]     PRQ [id number] CORONARY ANGIOPLASTY ADDL BRANCH      PCI and ALYSSA to ostial LAD on 6/27/18    IMPLANT AUTOMATIC IMPLANTABLE CARDIOVERTER DEFIBRILLATOR      INSERT VENTRICULAR ASSIST DEVICE LEFT (HEARTMATE II) N/A 12/31/2018    Procedure: Median Sternotomy, On Cardiopulmonary Bypass Pump, Insertion of Left Ventricular Assist Device (Heartmate III);  Surgeon: Kamaljit Baker MD;  [facility]    PICC DOUBLE LUMEN PLACEMENT Right 05/22/2020    5FR DL PICC inserted at right basilic vein, length 38cm.    TRANSPLANT HEART RECIPIENT AFTER HEART MATE N/A 5/18/2020    Procedure: REDO MEDIAN STERNOTOMY, LYSIS OF ADHESIONS, ON CARDIOPULMONARY BYPASS PUMP, HEART TRANSPLANT RECIPIENT, REMOVAL OF LEFT VENTRICULAR ASSIST DEVICE, REMOVAL OF AUTOMATED IMPLANTABLE CARDIOVERTER DEFIBRILLATOR;  Surgeon: Elder Willis MD;  [facility]       Physical Exam    No exam today.    RESULTS  Creatinine   Date Value Ref Range Status   05/11/2023 1.75 (H) 0.67 - 1.17 mg/dL Final   06/23/2021 1.93 (H) 0.66 - 1.25 mg/dL Final     GFR Estimate   Date Value Ref Range Status   05/11/2023 44 (L) >60 mL/min/1.73m2 Final     Comment:     eGFR calculated using  2021 CKD-EPI equation.   06/23/2021 37 (L) >60 mL/min/[1.73_m2] Final     Comment:     Non  GFR Calc  Starting 12/18/2018, serum creatinine based estimated GFR (eGFR) will be   calculated using the Chronic Kidney Disease Epidemiology Collaboration   (CKD-EPI) equation.       Hemoglobin A1C   Date Value Ref Range Status   05/11/2023 7.4 (H) <5.7 % Final     Comment:     Normal <5.7%   Prediabetes 5.7-6.4%    Diabetes 6.5% or higher     Note: Adopted from ADA consensus guidelines.   06/23/2021 10.3 (H) 0 - 5.6 % Final     Comment:     Normal <5.7% Prediabetes 5.7-6.4%  Diabetes 6.5% or higher - adopted from ADA   consensus guidelines.       Potassium   Date Value Ref Range Status   05/11/2023 3.8 3.4 - 5.3 mmol/L Final   05/18/2022 3.5 3.4 - 5.3 mmol/L Final   06/23/2021 3.6 3.4 - 5.3 mmol/L Final     ALT   Date Value Ref Range Status   05/11/2023 19 10 - 50 U/L Final   06/23/2021 26 0 - 70 U/L Final     AST   Date Value Ref Range Status   05/11/2023 23 10 - 50 U/L Final   06/23/2021 22 0 - 45 U/L Final     TSH   Date Value Ref Range Status   03/03/2021 1.87 0.40 - 4.00 mU/L Final     T4 Free   Date Value Ref Range Status   03/03/2021 1.08 0.76 - 1.46 ng/dL Final       Cholesterol   Date Value Ref Range Status   05/11/2023 153 <200 mg/dL Final   02/15/2023 186 <200 mg/dL Final   06/23/2021 168 <200 mg/dL Final   10/29/2020 161 <200 mg/dL Final     HDL Cholesterol   Date Value Ref Range Status   06/23/2021 49 >39 mg/dL Final   10/29/2020 51 >39 mg/dL Final     Direct Measure HDL   Date Value Ref Range Status   05/11/2023 36 (L) >=40 mg/dL Final   02/15/2023 39 (L) >=40 mg/dL Final     LDL Cholesterol Calculated   Date Value Ref Range Status   05/11/2023 61 <=100 mg/dL Final   02/15/2023 106 (H) <=100 mg/dL Final   06/23/2021 75 <100 mg/dL Final     Comment:     Desirable:       <100 mg/dl   10/29/2020 81 <100 mg/dL Final     Comment:     Desirable:       <100 mg/dl     LDL Cholesterol Direct   Date  Value Ref Range Status   10/26/2021 105 (H) <100 mg/dL Final     Comment:     Age 0-19 years:  Desirable: 0-110 mg/dL   Borderline high: 110-129 mg/dL   High: >= 130 mg/dL    Age 20 years and older:  Desirable: <100mg/dL  Above desirable: 100-129 mg/dL   Borderline high: 130-159 mg/dL   High: 160-189 mg/dL   Very high: >= 190 mg/dL     Triglycerides   Date Value Ref Range Status   05/11/2023 280 (H) <150 mg/dL Final   02/15/2023 206 (H) <150 mg/dL Final   06/23/2021 220 (H) <150 mg/dL Final     Comment:     Borderline high:  150-199 mg/dl  High:             200-499 mg/dl  Very high:       >499 mg/dl     10/29/2020 144 <150 mg/dL Final       ASSESSMENT/PLAN:      1.  TYPE 2 DIABETES MELLITUS: Glycemic control has improved with addition of Ozempic.  Red has lost some weight and has been more active with more hypoglycemia.  Continue Ozempic 1 mg subcutaneous once a week.  Pt denies hx of pancreatitis or gastroparesis.  Decrease Lantus 34 units subcutaneous at hs and continue Humalog 1 unit/8 gms CHO with meals and snacks,plus correction insulin 1 unit/25 for BG > 140 with meals and > 200 at bedtime.  Continue Farxiga 10 mg daily.    Again, I reviewed how Farxiga works and possible side effects including dehydration, yeast infection and possible UTI.   Reminded him to drink plenty of water.  Avoid use of Metformin due to CKD and lower GFR.  Continue to use Freestyle Libre2 sensor to monitor blood sugars.  Pt was seen by Oph in Jan 2023 without retinopathy per patient.  He reports mild tingling in his feet.  No foot ulcers at this time.  He is taking ASA daily.    2.  CKD/ANDREW: Creat 1.80 with GFR 43 mL/min on 6/1/2023.  Avoid use of Metformin.  Pt taking Farxiga.    3.  HX OF ISCHEMIC CARDIOMYOPATHY: S/P heart transplant on 5/18/2020 and followed closely by transplant staff here.    4.  HTN: No vitals today. Continue current RXs.    5.  WEIGHT GAIN: Some weight loss with addition of Ozempic.  Reminded patient to make  healthy food choices, reduce food portions with meals, try to avoid snacking and increase activity.     6  FOLLOW UP: with me in 3 months.  A1C ordered today.    Time spent reviewing chart, labs and Freestyle Libre2 sensor data today = 6 minutes.  Time for video visit today = 13 minutes.  Time for documentation today = 15 minutes.    TOTAL TIME FOR VIDEO VISIT TODAY= 34 minutes.    Jeannette Schafer PA-C   Answers submitted by the patient for this visit:  Symptoms you have experienced in the last 30 days (Submitted on 8/11/2023)  General Symptoms: No  Skin Symptoms: No  HENT Symptoms: No  EYE SYMPTOMS: No  HEART SYMPTOMS: No  LUNG SYMPTOMS: No  INTESTINAL SYMPTOMS: No  URINARY SYMPTOMS: No  REPRODUCTIVE SYMPTOMS: No  SKELETAL SYMPTOMS: No  BLOOD SYMPTOMS: No  NERVOUS SYSTEM SYMPTOMS: No  MENTAL HEALTH SYMPTOMS: No      Again, thank you for allowing me to participate in the care of your patient.      Sincerely,    Jeannette Schafer PA-C

## 2023-08-11 NOTE — NURSING NOTE
Is the patient currently in the state of [place]? YES    Visit mode:VIDEO    If the visit is dropped, the patient can be reconnected by: VIDEO VISIT: Text to cell phone: [phone]    Will anyone else be joining the visit? NO      How would you like to obtain your AVS? MyChart    Are changes needed to the allergy or medication list? NO    Reason for visit: RECHECK and Video Visit

## 2023-08-11 NOTE — PROGRESS NOTES
Time of start: 3:30 pm  Time of end: 3:43 pm   Total duration of video visit: 13 minutes.  Providers location: offsite.  Patients location: [facility]  Mariusz ( Red ) ISABEL Sharp is a 62 year old male with type 2 diabetes mellitus. Video visit today for diabetes care.  Pt was admitted 5/17/2020-5/29/2020 and underwent a heart transplant on 5/18/2020.  Pt's hx is significant for type 2 diabetes mellitus dx 5 + years ago, HTN, hyperlipidemia, hx of ischemic heart disease s/p STEMI with ALYSSA 2019, LVAD placement, Stage 3 CKD, and obesity.  Red was also admitted 5/12-5/15/2020 for ANDREW due to nephrolithiasis complicated by hydronephrosis/ANDREW.  For his diabetes, pt is taking Ozempic 1 mg subcutaneous once a week, Lantus 36 units subcutaneous each pm and Humalog 1 unit/5 gms CHO with meals with correction scale  ( 1 unit/25 for BG > 150 ) and bedtime correction scale ( 1 unit/25 for BG > 200).   He is also taking Farxiga 10 mg each am.  Most recent A1C was 6.9 % yesterday ( 6/14/2023 ) at his PCP's office.  Pt's A1C was 7.4 % on 5/11/2023.  Red is now using a Freestyle Libre2 sensor to monitor his blood sugars.  His sensor download is below .   He has been having low blood sugars.  Pt's blood sugar is in target 85% of he time.  On ROS today, some weight loss.  Mild tingling in both feet. Denies foot ulcers/sores.  Pt denies blurred vision, n/v, SOB at rest,cough,fever,chest pain, abd pain, diarrhea, dysuria or hematuria.    Diabetes Care  Retinopathy:none; pt seen by Oph in Jan 2023 with no retinopathy per patient.  Nephropathy:yes- hx of CKD/ANDREW.  Neuropathy: mild tingling in both feet.  Foot Exam:no exam today.  Taking aspirin: yes.  Lipids: LDL 61 in 5/2023.  Pt is taking Crestor.  CAD: yes; s/p heart transplant on 5/18/2020.  Depression: no.  Insulin: Basal and meal time insulin with correction scale ( 1 unit/25 for BG > 150 with meals and > 200 at bedtime).    DM meds: Farxiga and Ozempic.  Testing: Freestyle Libre2  sensor.          ROS  See under HPI.    Allergies  No Known Allergies    Medications  Current Outpatient Medications   Medication Sig Dispense Refill    ACCU-CHEK [place]TE PLUS test strip Check BG 3 times daily at meals and at bedtime. 400 strip 3    acetaminophen (TYLENOL) 325 MG tablet Take 2 tablets (650 mg) by mouth every 4 hours as needed for other (multimodal surgical pain management along with NSAIDS and opioid medication as indicated based on pain control and physical function.)      amLODIPine (NORVASC) 10 MG tablet Take 1 tablet (10 mg) by mouth daily 90 tablet 3    aspirin (ASA) 81 MG EC tablet Take 1 tablet (81 mg) by mouth daily 30 tablet 11    BD SILVANA U/F 32G X 4 MM insulin pen needle Use 6 times daily. 600 each 3    calcium carbonate 600 mg-vitamin D 400 units (CALTRATE) 600-400 MG-UNIT per tablet Take 1 tablet by mouth 2 times daily (with meals)      Continuous Blood Gluc Sensor (FREESTYLE DARRELL 2 SENSOR) MISC 1 each every 14 days Use 1 sensor every 14 days. Use to read blood sugars per 's instructions. 2 each 5    dapagliflozin (FARXIGA) 10 MG TABS tablet Take 1 tablet (10 mg) by mouth daily 90 tablet 3    everolimus (ZORTRESS) 0.5 MG tablet Take 3 tablets (1.5 mg) by mouth every morning AND 2 tablets (1 mg) every evening. 450 tablet 3    HUMALOG KWIKPEN 100 UNIT/ML soln INJECT ONE UNIT PER EVERY 5 GRAMS OF CARBS WITH MEALS AND SNACKS, PLUS CORRECTION, APPROX 70 UNITS IN 24 HOURS 75 mL 3    hydrALAZINE (APRESOLINE) 50 MG tablet Take 2.5 tablets (125 mg) by mouth 3 times daily 675 tablet 3    insulin glargine (LANTUS PEN) 100 UNIT/ML pen Inject 36 units subcutaneous at hs. 15 mL 3    loperamide (IMODIUM) 2 MG capsule Take 1 capsule (2 mg) by mouth 4 times daily as needed for diarrhea 60 capsule 0    magnesium oxide (MAG-OX) 400 (241.3 Mg) MG tablet Take 1 tablet (400 mg) by mouth daily 90 tablet 3    multivitamin w/minerals (THERA-VIT-M) tablet Take 1 tablet by mouth daily 90 tablet 3     mycophenolate (GENERIC EQUIVALENT) 250 MG capsule Take 5 capsules (1,250 mg) by mouth 2 times daily 300 capsule 11    OZEMPIC, 1 MG/DOSE, 4 MG/3ML pen Inject 1 mg subcutaneous once a week once you have taken 0.5 mg subcutaneous weekly x 1 month. 15 mL 1    pantoprazole (PROTONIX) 40 MG EC tablet Take 1 tablet (40 mg) by mouth every morning (before breakfast) 90 tablet 3    potassium chloride ER (KLOR-CON M) 20 MEQ CR tablet Take 1 tablet (20 mEq) by mouth daily 90 tablet 3    rosuvastatin (CRESTOR) 20 MG tablet Take 1 tablet (20 mg) by mouth daily 90 tablet 3    torsemide (DEMADEX) 20 MG tablet Take 1 tablet (20 mg) by mouth daily 90 tablet 3       Family History  Mother and father with hx of type 2 DM.  Social History   reports that he has never smoked. He has never used smokeless tobacco. He reports that he does not drink alcohol and does not use drugs.     Past Medical History  Past Medical History:   Diagnosis Date    Acute kidney injury (H)     CKD (chronic kidney disease)     Coronary artery disease     Diabetes mellitus (H)     HTN (hypertension)     Hyperlipidemia     ICD (implantable cardioverter-defibrillator), single chamber- NOT dependent 7/17/2018    Ischemic cardiomyopathy     LV (left ventricular) mural thrombus     Paroxysmal atrial flutter (H)     RVF (right ventricular failure) (H)     Systolic heart failure (H)     Ventricular tachycardia (H)        Past Surgical History:   Procedure Laterality Date    BRONCHOSCOPY (RIGID OR FLEXIBLE), DIAGNOSTIC N/A 9/15/2020    Procedure: BRONCHOSCOPY, WITH BRONCHOALVEOLAR LAVAGE;  Surgeon: Elder Mejia MD;  Location: [facility]    BRONCHOSCOPY (RIGID OR FLEXIBLE), DIAGNOSTIC N/A 9/17/2020    Procedure: BRONCHOSCOPY, WITH BRONCHOALVEOLAR LAVAGE;  Surgeon: Bill Lemus MD;  Location: [facility]    BRONCHOSCOPY RIGID OR FLEXIBLE W/TRANSENDOSCOPIC ENDOBRONCHIAL ULTRASOUND GUIDED Right 9/8/2020    Procedure: BRONCHOSCOPY, WITH ENDOBRONCHIAL ULTRASOUND;  Surgeon:  Donny Mercedes MD;  Location:  GI    COLONOSCOPY N/A 12/7/2018    Procedure: COLONOSCOPY;  Surgeon: Krish Mercado MD;  Location: [facility]    CV CORONARY ANGIOGRAM N/A 6/23/2021    Procedure: CV CORONARY ANGIOGRAM;  Surgeon: Brian Long MD;  Location:  [facility]ATH [facility]    CV CORONARY ANGIOGRAM N/A 5/18/2022    Procedure: Coronary Angiogram [[id number]];  Surgeon: Khris Mcclelland MD;  Location:  [facility]ATH [facility]    CV CORONARY ANGIOGRAM N/A 5/11/2023    Procedure: Coronary Angiogram;  Surgeon: Khris Mcclelland MD;  Location:  [facility]ATH [facility]    CV HEART BIOPSY N/A 5/24/2020    Procedure: Heart Biopsy;  Surgeon: Kit Bacon MD;  Location:  [facility]ATH [facility]    CV HEART BIOPSY N/A 6/1/2020    Procedure: CV HEART BIOPSY;  Surgeon: Kit Bacon MD;  Location:  [facility]ATH [facility]    CV HEART BIOPSY N/A 6/8/2020    Procedure: CV HEART BIOPSY;  Surgeon: Kit Bacon MD;  Location:  [facility]ATH [facility]    CV HEART BIOPSY N/A 6/15/2020    Procedure: CV HEART BIOPSY;  Surgeon: Kit Bacon MD;  Location:  [facility]ATH [facility]    CV HEART BIOPSY N/A 6/30/2020    Procedure: CV HEART BIOPSY;  Surgeon: Kit Bacon MD;  Location:  [facility]ATH [facility]    CV HEART BIOPSY N/A 7/14/2020    Procedure: CV HEART BIOPSY;  Surgeon: Brian Long MD;  Location:  [facility]    CV HEART BIOPSY N/A 7/29/2020    Procedure: CV HEART BIOPSY;  Surgeon: Momo Hunt MD;  Location:  [facility]ATH [facility]    CV HEART BIOPSY N/A 8/13/2020    Procedure: CV HEART BIOPSY;  Surgeon: Khris Mcclelland MD;  Location:  [facility] CATH [facility]    CV HEART BIOPSY N/A 8/26/2020    Procedure: CV HEART BIOPSY;  Surgeon: Momo Hunt MD;  Location:  [facility]ARD[facility] CATH [facility]    CV HEART BIOPSY N/A 9/30/2020    Procedure: CV HEART BIOPSY;  Surgeon: Artemio  MD Artemio;  [facility]    CV HEART BIOPSY N/A 10/29/2020    Procedure: CV HEART BIOPSY;  Surgeon: Kit Bacon MD;  [facility]: [facility]    CV HEART BIOPSY N/A 1/5/2021    Procedure: CV HEART BIOPSY;  Surgeon: Carlin Garcia MD;  [facility]    CV HEART BIOPSY N/A 6/23/2021    Procedure: CV HEART BIOPSY;  Surgeon: Brian Long MD;  [facility]    CV HEART BIOPSY N/A 10/26/2021    Procedure: CV HEART BIOPSY;  Surgeon: Kit Bacon MD;  Location:  [facility]    CV HEART BIOPSY N/A 5/18/2022    Procedure: Heart Biopsy;  Surgeon: Khris Mcclelland MD;  Location:  [facility]ATH [facility]    CV HEART BIOPSY N/A 5/11/2023    Procedure: Heart Biopsy;  Surgeon: Khris Mcclelland MD;  Location:  [facility]ARD[facility]ATH LAB    CV RIGHT HEART CATH MEASUREMENTS RECORDED N/A 2/19/2019    Procedure: RHC;  Surgeon: Brian Long MD;  Location:  [facility]ATH [facility]    CV RIGHT HEART CATH MEASUREMENTS RECORDED N/A 7/5/2019    Procedure: CV RIGHT HEART CATH;  Surgeon: Khris Mcclelland MD;  Location:  [facility]ATH [facility]    CV RIGHT HEART CATH MEASUREMENTS RECORDED N/A 5/24/2020    Procedure: Right Heart Cath;  Surgeon: Kit Bacon MD;  Location:  [facility]ATH [facility]    CV RIGHT HEART CATH MEASUREMENTS RECORDED N/A 6/1/2020    Procedure: CV RIGHT HEART CATH;  Surgeon: Kit Bacon MD;  Location:  [facility]ATH [facility]    CV RIGHT HEART CATH MEASUREMENTS RECORDED N/A 6/8/2020    Procedure: CV RIGHT HEART CATH;  Surgeon: Kit Bacon MD;  Location:  [facility]    CV RIGHT HEART CATH MEASUREMENTS RECORDED N/A 6/15/2020    Procedure: CV RIGHT HEART CATH;  Surgeon: Kit Bacon MD;  Location:  [facility]    CV RIGHT HEART CATH MEASUREMENTS RECORDED N/A  6/30/2020    Procedure: CV RIGHT HEART CATH;  Surgeon: Kit Bacon MD;  Location:  [facility]ATH [facility]    CV RIGHT HEART CATH MEASUREMENTS RECORDED N/A 7/14/2020    Procedure: CV RIGHT HEART CATH;  Surgeon: Brian Long MD;  Location:  [facility]ATH [facility]    CV RIGHT HEART CATH MEASUREMENTS RECORDED N/A 7/29/2020    Procedure: CV RIGHT HEART CATH;  Surgeon: Momo Hunt MD;  Location:  [facility]ARD[facility]ATH [facility]    CV RIGHT HEART CATH MEASUREMENTS RECORDED N/A 8/13/2020    Procedure: CV RIGHT HEART CATH;  Surgeon: Khris Mcclelland MD;  Location:  [facility]ATH [facility]    CV RIGHT HEART CATH MEASUREMENTS RECORDED N/A 8/26/2020    Procedure: CV RIGHT HEART CATH;  Surgeon: Momo Hunt MD;  Location:  [facility]ATH [facility]    CV RIGHT HEART CATH MEASUREMENTS RECORDED N/A 9/30/2020    Procedure: Right Heart Cath;  Surgeon: Artemio Ulloa MD;  Location:  HE[facility]ARD[facility] CATH LAB    CV RIGHT HEART CATH MEASUREMENTS RECORDED N/A 10/29/2020    Procedure: CV RIGHT HEART CATH;  Surgeon: Kit Bacon MD;  Location:  [facility]ARD[facility] CATH LAB    CV RIGHT HEART CATH MEASUREMENTS RECORDED N/A 1/5/2021    Procedure: CV RIGHT HEART CATH;  Surgeon: Carlin Garcia MD;  Location:  [facility]ATH [facility]    CV RIGHT HEART CATH MEASUREMENTS RECORDED N/A 6/23/2021    Procedure: CV RIGHT HEART CATH;  Surgeon: Brian Long MD;  Location:  [facility]ARD[facility] CATH [facility]    CV RIGHT HEART CATH MEASUREMENTS RECORDED N/A 10/26/2021    Procedure: CV RIGHT HEART CATH;  Surgeon: Kit Bacon MD;  Location:  [facility]ARD[facility] CATH LAB    CV RIGHT HEART CATH MEASUREMENTS RECORDED N/A 5/18/2022    Procedure: Right Heart Catheterization;  Surgeon: Khris Mcclelland MD;  Location:  [facility]ATH [facility]    CV RIGHT HEART CATH MEASUREMENTS RECORDED N/A 5/11/2023    Procedure: Right Heart Catheterization;  Surgeon: Khris Mcclelland MD;   [facility]    ENDOBRONCHIAL ULTRASOUND FLEXIBLE N/A 9/17/2020    Procedure: BRONCHOSCOPY, flexible, endobronchial ultrasound with transbronchial biopsies, endobronchial biopsies;  Surgeon: Bill Lemus MD;  Location: [facility]    HC PRQ [id number] CORONARY ANGIOPLASTY ADDL BRANCH      PCI and ALYSSA to ostial LAD on 6/27/18    IMPLANT AUTOMATIC IMPLANTABLE CARDIOVERTER DEFIBRILLATOR      INSERT VENTRICULAR ASSIST DEVICE LEFT (HEARTMATE II) N/A 12/31/2018    Procedure: Median Sternotomy, On Cardiopulmonary Bypass Pump, Insertion of Left Ventricular Assist Device (Heartmate III);  Surgeon: Kamaljit Baker MD;  Location: [facility]    PICC DOUBLE LUMEN PLACEMENT Right 05/22/2020    5FR DL PICC inserted at right basilic vein, length 38cm.    TRANSPLANT HEART RECIPIENT AFTER HEART MATE N/A 5/18/2020    Procedure: REDO MEDIAN STERNOTOMY, LYSIS OF ADHESIONS, ON CARDIOPULMONARY BYPASS PUMP, HEART TRANSPLANT RECIPIENT, REMOVAL OF LEFT VENTRICULAR ASSIST DEVICE, REMOVAL OF AUTOMATED IMPLANTABLE CARDIOVERTER DEFIBRILLATOR;  Surgeon: Elder Willis MD;  Location: [facility]       Physical Exam    No exam today.    RESULTS  Creatinine   Date Value Ref Range Status   05/11/2023 1.75 (H) 0.67 - 1.17 mg/dL Final   06/23/2021 1.93 (H) 0.66 - 1.25 mg/dL Final     GFR Estimate   Date Value Ref Range Status   05/11/2023 44 (L) >60 mL/min/1.73m2 Final     Comment:     eGFR calculated using 2021 CKD-EPI equation.   06/23/2021 37 (L) >60 mL/min/[1.73_m2] Final     Comment:     Non  GFR Calc  Starting 12/18/2018, serum creatinine based estimated GFR (eGFR) will be   calculated using the Chronic Kidney Disease Epidemiology Collaboration   (CKD-EPI) equation.       Hemoglobin A1C   Date Value Ref Range Status   05/11/2023 7.4 (H) <5.7 % Final     Comment:     Normal <5.7%   Prediabetes 5.7-6.4%    Diabetes 6.5% or higher     Note: Adopted from ADA consensus guidelines.   06/23/2021 10.3 (H) 0 - 5.6 % Final      Comment:     Normal <5.7% Prediabetes 5.7-6.4%  Diabetes 6.5% or higher - adopted from ADA   consensus guidelines.       Potassium   Date Value Ref Range Status   05/11/2023 3.8 3.4 - 5.3 mmol/L Final   05/18/2022 3.5 3.4 - 5.3 mmol/L Final   06/23/2021 3.6 3.4 - 5.3 mmol/L Final     ALT   Date Value Ref Range Status   05/11/2023 19 10 - 50 U/L Final   06/23/2021 26 0 - 70 U/L Final     AST   Date Value Ref Range Status   05/11/2023 23 10 - 50 U/L Final   06/23/2021 22 0 - 45 U/L Final     TSH   Date Value Ref Range Status   03/03/2021 1.87 0.40 - 4.00 mU/L Final     T4 Free   Date Value Ref Range Status   03/03/2021 1.08 0.76 - 1.46 ng/dL Final       Cholesterol   Date Value Ref Range Status   05/11/2023 153 <200 mg/dL Final   02/15/2023 186 <200 mg/dL Final   06/23/2021 168 <200 mg/dL Final   10/29/2020 161 <200 mg/dL Final     HDL Cholesterol   Date Value Ref Range Status   06/23/2021 49 >39 mg/dL Final   10/29/2020 51 >39 mg/dL Final     Direct Measure HDL   Date Value Ref Range Status   05/11/2023 36 (L) >=40 mg/dL Final   02/15/2023 39 (L) >=40 mg/dL Final     LDL Cholesterol Calculated   Date Value Ref Range Status   05/11/2023 61 <=100 mg/dL Final   02/15/2023 106 (H) <=100 mg/dL Final   06/23/2021 75 <100 mg/dL Final     Comment:     Desirable:       <100 mg/dl   10/29/2020 81 <100 mg/dL Final     Comment:     Desirable:       <100 mg/dl     LDL Cholesterol Direct   Date Value Ref Range Status   10/26/2021 105 (H) <100 mg/dL Final     Comment:     Age 0-19 years:  Desirable: 0-110 mg/dL   Borderline high: 110-129 mg/dL   High: >= 130 mg/dL    Age 20 years and older:  Desirable: <100mg/dL  Above desirable: 100-129 mg/dL   Borderline high: 130-159 mg/dL   High: 160-189 mg/dL   Very high: >= 190 mg/dL     Triglycerides   Date Value Ref Range Status   05/11/2023 280 (H) <150 mg/dL Final   02/15/2023 206 (H) <150 mg/dL Final   06/23/2021 220 (H) <150 mg/dL Final     Comment:     Borderline high:  150-199  mg/dl  High:             200-499 mg/dl  Very high:       >499 mg/dl     10/29/2020 144 <150 mg/dL Final       ASSESSMENT/PLAN:      1.  TYPE 2 DIABETES MELLITUS: Glycemic control has improved with addition of Ozempic.  Red has lost some weight and has been more active with more hypoglycemia.  Continue Ozempic 1 mg subcutaneous once a week.  Pt denies hx of pancreatitis or gastroparesis.  Decrease Lantus 34 units subcutaneous at hs and continue Humalog 1 unit/8 gms CHO with meals and snacks,plus correction insulin 1 unit/25 for BG > 140 with meals and > 200 at bedtime.  Continue Farxiga 10 mg daily.    Again, I reviewed how Farxiga works and possible side effects including dehydration, yeast infection and possible UTI.   Reminded him to drink plenty of water.  Avoid use of Metformin due to CKD and lower GFR.  Continue to use Freestyle Libre2 sensor to monitor blood sugars.  Pt was seen by [facility] in Jan 2023 without retinopathy per patient.  He reports mild tingling in his feet.  No foot ulcers at this time.  He is taking ASA daily.    2.  CKD/ANDREW: Creat 1.80 with GFR 43 mL/min on 6/1/2023.  Avoid use of Metformin.  Pt taking Farxiga.    3.  HX OF ISCHEMIC CARDIOMYOPATHY: S/P heart transplant on 5/18/2020 and followed closely by transplant staff here.    4.  HTN: No vitals today. Continue current RXs.    5.  WEIGHT GAIN: Some weight loss with addition of Ozempic.  Reminded patient to make healthy food choices, reduce food portions with meals, try to avoid snacking and increase activity.     6  FOLLOW UP: with me in 3 months.  A1C ordered today.    Time spent reviewing chart, labs and Freestyle Libre2 sensor data today = 6 minutes.  Time for video visit today = 13 minutes.  Time for documentation today = 15 minutes.    TOTAL TIME FOR VIDEO VISIT TODAY= 34 minutes.    Jeannette Schafer PA-C   Answers submitted by the patient for this visit:  Symptoms you have experienced in the last 30 days (Submitted on 8/11/2023)  General  Symptoms: No  Skin Symptoms: No  HENT Symptoms: No  EYE SYMPTOMS: No  HEART SYMPTOMS: No  LUNG SYMPTOMS: No  INTESTINAL SYMPTOMS: No  URINARY SYMPTOMS: No  REPRODUCTIVE SYMPTOMS: No  SKELETAL SYMPTOMS: No  BLOOD SYMPTOMS: No  NERVOUS SYSTEM SYMPTOMS: No  MENTAL HEALTH SYMPTOMS: No

## 2023-09-01 NOTE — TELEPHONE ENCOUNTER
Faxed A1C order to [facility], [address] [place] [phone] Fax: [phone].

## 2023-09-15 ENCOUNTER — MYC MEDICAL ADVICE (OUTPATIENT)
Dept: GASTROENTEROLOGY | Facility: CLINIC | Age: 63
End: 2023-09-15
Payer: COMMERCIAL

## 2023-09-22 ENCOUNTER — TELEPHONE (OUTPATIENT)
Dept: ENDOCRINOLOGY | Facility: CLINIC | Age: 63
End: 2023-09-22
Payer: COMMERCIAL

## 2023-09-22 DIAGNOSIS — Z79.4 TYPE 2 DIABETES MELLITUS WITH HYPERGLYCEMIA, WITH LONG-TERM CURRENT USE OF INSULIN (H): ICD-10-CM

## 2023-09-22 DIAGNOSIS — E11.65 TYPE 2 DIABETES MELLITUS WITH HYPERGLYCEMIA, WITH LONG-TERM CURRENT USE OF INSULIN (H): ICD-10-CM

## 2023-09-22 NOTE — TELEPHONE ENCOUNTER
[facility] Call Center    Phone Message    May a detailed message be left on voicemail: yes     Reason for Call: Other: PT called in regards to his Freestyle Osiris 2 sensor. He is supposed to have 5 refills left, but it's being denied by the pharmacy. Please send over a new prescription to his pharmacy. He will be out by tomorrow, 9/23     Action Taken: Other: [facility]    Travel Screening: Not Applicable

## 2023-09-25 ENCOUNTER — DOCUMENTATION ONLY (OUTPATIENT)
Dept: TRANSPLANT | Facility: CLINIC | Age: 63
End: 2023-09-25
Payer: COMMERCIAL

## 2023-09-25 ASSESSMENT — ENCOUNTER SYMPTOMS: NEW SYMPTOMS OF CORONARY ARTERY DISEASE: 0

## 2023-10-29 NOTE — PLAN OF CARE
Discharge Planner OT   Patient plan for discharge: home  Current status: OT:Faciliated in room and in reddy ambulation to increase ind in ADLS/IADLS. Pt ambulated 300ft w/ no rest breaks and SBA throughout. Pt's VSS on RA. OT educated pt on signs/symptoms of activity intolerance throughout and modulating rest breaks prn. Pt agreeable to all education and training throughout.   Barriers to return to prior living situation: medical status  Recommendations for discharge: home w/ A and OP therapy  Rationale for recommendations: to increase ind and activity tolerance in ADLS       Entered by: Olga Dubon 09/02/2020 2:08 PM        show

## 2023-11-14 ENCOUNTER — TELEPHONE (OUTPATIENT)
Dept: TRANSPLANT | Facility: CLINIC | Age: 63
End: 2023-11-14

## 2023-11-14 NOTE — TELEPHONE ENCOUNTER
FREE DRUG APPLICATION INITIATED    Medication: ZORTRESS 0.5 MG PO TABS  [facility] Drug [facility]  [facility]  Date Submitted: 11/14/2023 11:43 AM  Phone #: [phone]  Fax #: [phone]  Additional Information: n/a     Attending to bill Attending to bill

## 2023-11-16 ENCOUNTER — VIRTUAL VISIT (OUTPATIENT)
Dept: ENDOCRINOLOGY | Facility: CLINIC | Age: 63
End: 2023-11-16
Payer: COMMERCIAL

## 2023-11-16 DIAGNOSIS — E11.65 TYPE 2 DIABETES MELLITUS WITH HYPERGLYCEMIA, WITH LONG-TERM CURRENT USE OF INSULIN (H): Primary | ICD-10-CM

## 2023-11-16 DIAGNOSIS — Z79.4 TYPE 2 DIABETES MELLITUS WITH HYPERGLYCEMIA, WITH LONG-TERM CURRENT USE OF INSULIN (H): Primary | ICD-10-CM

## 2023-11-16 PROCEDURE — 99214 OFFICE O/P EST MOD 30 MIN: CPT | Mod: VID | Performed by: PHYSICIAN ASSISTANT

## 2023-11-16 ASSESSMENT — PAIN SCALES - GENERAL: PAINLEVEL: NO PAIN (0)

## 2023-11-16 NOTE — LETTER
11/16/2023       RE: Mariusz Sharp  [address] [facility] [address]     Dear Colleague,    Thank you for referring your patient, Mariusz Sharp, to the [facility] [place] at [facility]. Please see a copy of my visit note below.    Patient is showing 5/5 MNCM met.   BG data obtained via CGM portal : Ariel Ramirez                Virtual Visit Details    Type of service:  Video Visit   Video Start Time:   Video End Time:    Originating Location (pt. Location):   Distant Location (provider location):    Platform used for Video Visit:     Time of start: 11:28 am   Time of end: 11:39 am   Total duration of video visit: 11 minutes.  Providers location: offsite.  Patients location: [place].    [facility]  Mariusz ( Red ) ISABEL Sharp is a 63 year old male with type 2 diabetes mellitus. Video visit today for diabetes follow up.  Pt was admitted 5/17/2020-5/29/2020 and underwent a heart transplant on 5/18/2020.  Pt's hx is significant for type 2 diabetes mellitus dx 6 + years ago, HTN, hyperlipidemia, hx of ischemic heart disease s/p STEMI with ALYSSA 2019, LVAD placement, Stage 3 CKD, and obesity.  Red was also admitted 5/12-5/15/2020 for ANDREW due to nephrolithiasis complicated by hydronephrosis/ANDREW.  For his diabetes, pt is taking Ozempic 1 mg subcutaneous once a week, Lantus 34 units subcutaneous each pm and Humalog 1 unit/8 gms CHO with meals with correction scale  ( 1 unit/25 for BG > 150 ) and bedtime correction scale ( 1 unit/25 for BG > 200).   He is also taking Farxiga 10 mg each am.  Most recent AC was 7.0 % in June 2023.  Pt's A1C was 7.4 % on 5/11/2023.  Red is now using a Freestyle Libre2 sensor to monitor his blood sugars.  His sensor download data is scanned in his note below.  Pt's blood sugar is in target 72 % of he time.  On ROS today, weight loss as above.  Mild tingling in both feet. Denies foot ulcers/sores.  Pt denies blurred vision,  n/v, SOB at rest,cough,fever,chest pain, abd pain, diarrhea, dysuria or hematuria.    Diabetes Care  Retinopathy:none; pt seen by Oph in Jan 2023 with no retinopathy per patient.  Nephropathy:yes- hx of CKD/ANDREW.  Neuropathy: mild tingling in both feet.  Foot Exam:no exam today.  Taking aspirin: yes.  Lipids: LDL 61 in 5/2023.  Pt is taking Crestor.  CAD: yes; s/p heart transplant on 5/18/2020.  Depression: no.  Insulin: Basal and meal time insulin with correction scale ( 1 unit/25 for BG > 150 with meals and > 200 at bedtime).    DM meds: Farxiga and Ozempic.  Testing: Freestyle Libre2 sensor.            ROS  See under HPI.    Allergies  No Known Allergies    Medications  Current Outpatient Medications   Medication Sig Dispense Refill    ACCU-CHEK CHARLOTTE PLUS test strip Check BG 3 times daily at meals and at bedtime. 400 strip 3    acetaminophen (TYLENOL) 325 MG tablet Take 2 tablets (650 mg) by mouth every 4 hours as needed for other (multimodal surgical pain management along with NSAIDS and opioid medication as indicated based on pain control and physical function.)      amLODIPine (NORVASC) 10 MG tablet Take 1 tablet (10 mg) by mouth daily 90 tablet 3    aspirin (ASA) 81 MG EC tablet Take 1 tablet (81 mg) by mouth daily 30 tablet 11    BD SILVANA U/F 32G X 4 MM insulin pen needle Use 6 times daily. 600 each 3    calcium carbonate 600 mg-vitamin D 400 units (CALTRATE) 600-400 MG-UNIT per tablet Take 1 tablet by mouth 2 times daily (with meals)      Continuous Blood Gluc Sensor (FREESTYLE DARRELL 2 SENSOR) [facility] 1 each every 14 days Use 1 sensor every 14 days. Use to read blood sugars per 's instructions. 2 each 5    dapagliflozin (FARXIGA) 10 MG TABS tablet Take 1 tablet (10 mg) by mouth daily 90 tablet 3    everolimus (ZORTRESS) 0.5 MG tablet Take 3 tablets (1.5 mg) by mouth every morning AND 2 tablets (1 mg) every evening. 450 tablet 3    HUMALOG KWIKPEN 100 UNIT/ML soln INJECT 1 UNIT PER EVERY 8 GRAMS OF  CARBS WITH MEALS AND SNACKS, PLUS CORRECTION, APPROX 70 UNITS IN 24 HOURS 75 mL 3    hydrALAZINE (APRESOLINE) 50 MG tablet Take 2.5 tablets (125 mg) by mouth 3 times daily 675 tablet 3    insulin glargine (LANTUS PEN) 100 UNIT/ML pen Inject 34 units subcutaneous at hs. 15 mL 3    loperamide (IMODIUM) 2 MG capsule Take 1 capsule (2 mg) by mouth 4 times daily as needed for diarrhea 60 capsule 0    magnesium oxide (MAG-OX) 400 (241.3 Mg) MG tablet Take 1 tablet (400 mg) by mouth daily 90 tablet 3    multivitamin w/minerals (THERA-VIT-M) tablet Take 1 tablet by mouth daily 90 tablet 3    mycophenolate (GENERIC EQUIVALENT) 250 MG capsule Take 5 capsules (1,250 mg) by mouth 2 times daily 300 capsule 11    OZEMPIC, 1 MG/DOSE, 4 MG/3ML pen Inject 1 mg subcutaneous once a week once you have taken 0.5 mg subcutaneous weekly x 1 month. 15 mL 1    pantoprazole (PROTONIX) 40 MG EC tablet Take 1 tablet (40 mg) by mouth every morning (before breakfast) 90 tablet 3    potassium chloride ER (KLOR-CON M) 20 MEQ CR tablet Take 1 tablet (20 mEq) by mouth daily 90 tablet 3    rosuvastatin (CRESTOR) 20 MG tablet Take 1 tablet (20 mg) by mouth daily 90 tablet 3    torsemide (DEMADEX) 20 MG tablet Take 1 tablet (20 mg) by mouth daily 90 tablet 3       Family History  Mother and father with hx of type 2 DM.  Social History   reports that he has never smoked. He has never used smokeless tobacco. He reports that he does not drink alcohol and does not use drugs.     Past Medical History  Past Medical History:   Diagnosis Date    Acute kidney injury (H24)     CKD (chronic kidney disease)     Coronary artery disease     Diabetes mellitus (H)     HTN (hypertension)     Hyperlipidemia     ICD (implantable cardioverter-defibrillator), single chamber- NOT dependent 7/17/2018    Ischemic cardiomyopathy     LV (left ventricular) mural thrombus     Paroxysmal atrial flutter (H)     RVF (right ventricular failure) (H)     Systolic heart failure (H)      Ventricular tachycardia (H)        Past Surgical History:   Procedure Laterality Date    BRONCHOSCOPY (RIGID OR FLEXIBLE), DIAGNOSTIC N/A 9/15/2020    Procedure: BRONCHOSCOPY, WITH BRONCHOALVEOLAR LAVAGE;  Surgeon: Elder Mejia MD;  Location:  GI    BRONCHOSCOPY (RIGID OR FLEXIBLE), DIAGNOSTIC N/A 9/17/2020    Procedure: BRONCHOSCOPY, WITH BRONCHOALVEOLAR LAVAGE;  Surgeon: Bill Lemus MD;  [facility]    BRONCHOSCOPY RIGID OR FLEXIBLE W/TRANSENDOSCOPIC ENDOBRONCHIAL ULTRASOUND GUIDED Right 9/8/2020    Procedure: BRONCHOSCOPY, WITH ENDOBRONCHIAL ULTRASOUND;  Surgeon: Donny Mercedes MD;  Location:  GI    COLONOSCOPY N/A 12/7/2018    Procedure: COLONOSCOPY;  Surgeon: Krish Mercado MD;  [facility]    CV CORONARY ANGIOGRAM N/A 6/23/2021    Procedure: CV CORONARY ANGIOGRAM;  Surgeon: Brian Long MD;  Location:  HEART CARDIAC CATH LAB    CV CORONARY ANGIOGRAM N/A 5/18/2022    Procedure: Coronary Angiogram [[id number]];  Surgeon: Khris Mcclelland MD;  Location:  HEART CARDIAC CATH LAB    CV CORONARY ANGIOGRAM N/A 5/11/2023    Procedure: Coronary Angiogram;  Surgeon: Khris Mcclelland MD;  Location:  HEART CARDIAC CATH LAB    CV HEART BIOPSY N/A 5/24/2020    Procedure: Heart Biopsy;  Surgeon: Kit Bacon MD;  Location:  HEART CARDIAC CATH LAB    CV HEART BIOPSY N/A 6/1/2020    Procedure: CV HEART BIOPSY;  Surgeon: Kit Bacon MD;  Location:  HEART CARDIAC CATH LAB    CV HEART BIOPSY N/A 6/8/2020    Procedure: CV HEART BIOPSY;  Surgeon: Kit Bacon MD;  Location:  HEART CARDIAC CATH LAB    CV HEART BIOPSY N/A 6/15/2020    Procedure: CV HEART BIOPSY;  Surgeon: Kit Bacon MD;  Location:  HEART CARDIAC CATH LAB    CV HEART BIOPSY N/A 6/30/2020    Procedure: CV HEART BIOPSY;  Surgeon: Kit Bacon MD;  Location:  HEART CARDIAC CATH LAB    CV HEART BIOPSY N/A 7/14/2020     Procedure: CV HEART BIOPSY;  Surgeon: Brian Long MD;  [facility]    CV HEART BIOPSY N/A 7/29/2020    Procedure: CV HEART BIOPSY;  Surgeon: oMmo Hunt MD;  [facility]    CV HEART BIOPSY N/A 8/13/2020    Procedure: CV HEART BIOPSY;  Surgeon: Khris Mcclelland MD;  [facility]    CV HEART BIOPSY N/A 8/26/2020    Procedure: CV HEART BIOPSY;  Surgeon: Momo Hunt MD;  [facility]    CV HEART BIOPSY N/A 9/30/2020    Procedure: CV HEART BIOPSY;  Surgeon: Artemio Ulloa MD;  [facility]    CV HEART BIOPSY N/A 10/29/2020    Procedure: CV HEART BIOPSY;  Surgeon: Kit Bacon MD;  [facility]    CV HEART BIOPSY N/A 1/5/2021    Procedure: CV HEART BIOPSY;  Surgeon: Carlin Garcia MD;  [facility]    CV HEART BIOPSY N/A 6/23/2021    Procedure: CV HEART BIOPSY;  Surgeon: Brian Long MD;  [facility]    CV HEART BIOPSY N/A 10/26/2021    Procedure: CV HEART BIOPSY;  Surgeon: Kit Bacon MD;  [facility]    CV HEART BIOPSY N/A 5/18/2022    Procedure: Heart Biopsy;  Surgeon: Khris Mcclelland MD;  [facility]    CV HEART BIOPSY N/A 5/11/2023    Procedure: Heart Biopsy;  Surgeon: Khris Mcclelland MD;  [facility]    CV RIGHT HEART CATH MEASUREMENTS RECORDED N/A 2/19/2019    Procedure: RHC;  Surgeon: Brian Long MD;  [facility]ARD[facility]AC CATH LAB    CV RIGHT HEART CATH MEASUREMENTS RECORDED N/A 7/5/2019    Procedure: CV RIGHT HEART CATH;  Surgeon: Khris Mcclelland MD;  [facility]    CV RIGHT HEART CATH MEASUREMENTS RECORDED N/A 5/24/2020    Procedure: Right Heart Cath;  Surgeon: Kit Bacon MD;  Location: [facility]  CARDIAC CATH LAB    CV RIGHT HEART CATH MEASUREMENTS RECORDED N/A 6/1/2020    Procedure: CV RIGHT HEART CATH;  Surgeon: Kit Bacon MD;  Location:  HEART CARDIAC CATH LAB    CV RIGHT HEART CATH MEASUREMENTS RECORDED N/A 6/8/2020    Procedure: CV RIGHT HEART CATH;  Surgeon: Kit Bacon MD;  Location:  HEART CARDIAC CATH LAB    CV RIGHT HEART CATH MEASUREMENTS RECORDED N/A 6/15/2020    Procedure: CV RIGHT HEART CATH;  Surgeon: Kit Bacon MD;  Location:  HEART CARDIAC CATH LAB    CV RIGHT HEART CATH MEASUREMENTS RECORDED N/A 6/30/2020    Procedure: CV RIGHT HEART CATH;  Surgeon: Kit Bacon MD;  Location:  HEART CARDIAC CATH LAB    CV RIGHT HEART CATH MEASUREMENTS RECORDED N/A 7/14/2020    Procedure: CV RIGHT HEART CATH;  Surgeon: Brian Long MD;  Location:  HEART CARDIAC CATH LAB    CV RIGHT HEART CATH MEASUREMENTS RECORDED N/A 7/29/2020    Procedure: CV RIGHT HEART CATH;  Surgeon: Momo Hunt MD;  Location:  HEART CARDIAC CATH LAB    CV RIGHT HEART CATH MEASUREMENTS RECORDED N/A 8/13/2020    Procedure: CV RIGHT HEART CATH;  Surgeon: Khris Mcclelland MD;  Location:  HEART CARDIAC CATH LAB    CV RIGHT HEART CATH MEASUREMENTS RECORDED N/A 8/26/2020    Procedure: CV RIGHT HEART CATH;  Surgeon: Momo Hunt MD;  Location:  HEART CARDIAC CATH LAB    CV RIGHT HEART CATH MEASUREMENTS RECORDED N/A 9/30/2020    Procedure: Right Heart Cath;  Surgeon: Artemio Ulloa MD;  Location:  HEART CARDIAC CATH LAB    CV RIGHT HEART CATH MEASUREMENTS RECORDED N/A 10/29/2020    Procedure: CV RIGHT HEART CATH;  Surgeon: Kit Bacon MD;  Location:  HEART CARDIAC CATH LAB    CV RIGHT HEART CATH MEASUREMENTS RECORDED N/A 1/5/2021    Procedure: CV RIGHT HEART CATH;  Surgeon: Carlin Garcia MD;  Location:  HEART CARDIAC CATH LAB    CV RIGHT HEART CATH MEASUREMENTS RECORDED N/A 6/23/2021     Procedure: CV RIGHT HEART CATH;  Surgeon: Brian Long MD;  Location:  HEART CARDIAC CATH LAB    CV RIGHT HEART CATH MEASUREMENTS RECORDED N/A 10/26/2021    Procedure: CV RIGHT HEART CATH;  Surgeon: Kit Bacon MD;  Location:  HEART CARDIAC CATH LAB    CV RIGHT HEART CATH MEASUREMENTS RECORDED N/A 5/18/2022    Procedure: Right Heart Catheterization;  Surgeon: Khris Mcclelland MD;  Location:  HEART CARDIAC CATH LAB    CV RIGHT HEART CATH MEASUREMENTS RECORDED N/A 5/11/2023    Procedure: Right Heart Catheterization;  Surgeon: Khris Mcclelland MD;  Location:  HEART CARDIAC CATH LAB    ENDOBRONCHIAL ULTRASOUND FLEXIBLE N/A 9/17/2020    Procedure: BRONCHOSCOPY, flexible, endobronchial ultrasound with transbronchial biopsies, endobronchial biopsies;  Surgeon: Bill Lemus MD;  [facility]     PRQ [id number] CORONARY ANGIOPLASTY ADDL BRANCH      PCI and ALYSSA to ostial LAD on 6/27/18    IMPLANT AUTOMATIC IMPLANTABLE CARDIOVERTER DEFIBRILLATOR      INSERT VENTRICULAR ASSIST DEVICE LEFT (HEARTMATE II) N/A 12/31/2018    Procedure: Median Sternotomy, On Cardiopulmonary Bypass Pump, Insertion of Left Ventricular Assist Device (Heartmate III);  Surgeon: Kamaljit Baker MD;  [facility]    PICC DOUBLE LUMEN PLACEMENT Right 05/22/2020    5FR DL PICC inserted at right basilic vein, length 38cm.    TRANSPLANT HEART RECIPIENT AFTER HEART MATE N/A 5/18/2020    Procedure: REDO MEDIAN STERNOTOMY, LYSIS OF ADHESIONS, ON CARDIOPULMONARY BYPASS PUMP, HEART TRANSPLANT RECIPIENT, REMOVAL OF LEFT VENTRICULAR ASSIST DEVICE, REMOVAL OF AUTOMATED IMPLANTABLE CARDIOVERTER DEFIBRILLATOR;  Surgeon: Elder Willis MD;  [facility]       Physical Exam    No exam today.    RESULTS  Creatinine   Date Value Ref Range Status   05/11/2023 1.75 (H) 0.67 - 1.17 mg/dL Final   06/23/2021 1.93 (H) 0.66 - 1.25 mg/dL Final     GFR Estimate   Date Value Ref Range Status   05/11/2023 44 (L) >60  mL/min/1.73m2 Final     Comment:     eGFR calculated using 2021 CKD-EPI equation.   06/23/2021 37 (L) >60 mL/min/[1.73_m2] Final     Comment:     Non  GFR Calc  Starting 12/18/2018, serum creatinine based estimated GFR (eGFR) will be   calculated using the Chronic Kidney Disease Epidemiology Collaboration   (CKD-EPI) equation.       Hemoglobin A1C   Date Value Ref Range Status   05/11/2023 7.4 (H) <5.7 % Final     Comment:     Normal <5.7%   Prediabetes 5.7-6.4%    Diabetes 6.5% or higher     Note: Adopted from ADA consensus guidelines.   06/23/2021 10.3 (H) 0 - 5.6 % Final     Comment:     Normal <5.7% Prediabetes 5.7-6.4%  Diabetes 6.5% or higher - adopted from ADA   consensus guidelines.       Potassium   Date Value Ref Range Status   05/11/2023 3.8 3.4 - 5.3 mmol/L Final   05/18/2022 3.5 3.4 - 5.3 mmol/L Final   06/23/2021 3.6 3.4 - 5.3 mmol/L Final     ALT   Date Value Ref Range Status   05/11/2023 19 10 - 50 U/L Final   06/23/2021 26 0 - 70 U/L Final     AST   Date Value Ref Range Status   05/11/2023 23 10 - 50 U/L Final   06/23/2021 22 0 - 45 U/L Final     TSH   Date Value Ref Range Status   03/03/2021 1.87 0.40 - 4.00 mU/L Final     T4 Free   Date Value Ref Range Status   03/03/2021 1.08 0.76 - 1.46 ng/dL Final       Cholesterol   Date Value Ref Range Status   05/11/2023 153 <200 mg/dL Final   02/15/2023 186 <200 mg/dL Final   06/23/2021 168 <200 mg/dL Final   10/29/2020 161 <200 mg/dL Final     HDL Cholesterol   Date Value Ref Range Status   06/23/2021 49 >39 mg/dL Final   10/29/2020 51 >39 mg/dL Final     Direct Measure HDL   Date Value Ref Range Status   05/11/2023 36 (L) >=40 mg/dL Final   02/15/2023 39 (L) >=40 mg/dL Final     LDL Cholesterol Calculated   Date Value Ref Range Status   05/11/2023 61 <=100 mg/dL Final   02/15/2023 106 (H) <=100 mg/dL Final   06/23/2021 75 <100 mg/dL Final     Comment:     Desirable:       <100 mg/dl   10/29/2020 81 <100 mg/dL Final     Comment:      Desirable:       <100 mg/dl     LDL Cholesterol Direct   Date Value Ref Range Status   10/26/2021 105 (H) <100 mg/dL Final     Comment:     Age 0-19 years:  Desirable: 0-110 mg/dL   Borderline high: 110-129 mg/dL   High: >= 130 mg/dL    Age 20 years and older:  Desirable: <100mg/dL  Above desirable: 100-129 mg/dL   Borderline high: 130-159 mg/dL   High: 160-189 mg/dL   Very high: >= 190 mg/dL     Triglycerides   Date Value Ref Range Status   05/11/2023 280 (H) <150 mg/dL Final   02/15/2023 206 (H) <150 mg/dL Final   06/23/2021 220 (H) <150 mg/dL Final     Comment:     Borderline high:  150-199 mg/dl  High:             200-499 mg/dl  Very high:       >499 mg/dl     10/29/2020 144 <150 mg/dL Final       ASSESSMENT/PLAN:      1.  TYPE 2 DIABETES MELLITUS: Glycemic control has improved with addition of Ozempic.  Red has lost 14 lbs and most recent A1C was 7.0 % on 11/13/2023.   Continue Ozempic 1 mg subcutaneous once a week.  Pt denies hx of pancreatitis or gastroparesis.  Continue Lantus 34 units subcutaneous at hs and continue Humalog 1 unit/8 gms CHO with meals and snacks,plus correction insulin 1 unit/25 for BG > 140 with meals and > 200 at bedtime.  Continue Farxiga 10 mg daily.    Again, I reviewed how Farxiga works and possible side effects including dehydration, yeast infection and possible UTI.   Reminded him to drink plenty of water.  Avoid use of Metformin due to CKD.  Continue to use Freestyle Libre2 sensor to monitor blood sugars.  Pt was seen by Oph in Jan 2023 without retinopathy per patient.  He reports mild tingling in his feet.  No foot ulcers at this time.  He is taking ASA daily.    2.  CKD/ANDREW: Creat 1.80 with GFR 43 mL/min on 6/1/2023.  Avoid use of Metformin.  Pt taking Farxiga.    3.  HX OF ISCHEMIC CARDIOMYOPATHY: S/P heart transplant on 5/18/2020 and followed closely by transplant staff here.    4.  HTN: No vitals today. Continue current RXs.    5.  WEIGHT GAIN: Some weight loss with addition  of Ozempic.  Reminded patient to make healthy food choices, reduce food portions with meals, try to avoid snacking and increase activity.     6  FOLLOW UP: with me in 3 months.      Time spent reviewing chart, labs and Freestyle Libre2 sensor data today = 6 minutes.  Time for video visit today = 11 minutes.  Time for documentation today = 15 minutes.    TOTAL TIME FOR VIDEO VISIT TODAY= 32 minutes.    Jeannette Schafer PA-C

## 2023-11-16 NOTE — PROGRESS NOTES
Time of start: 11:28 am   Time of end: 11:39 am   Total duration of video visit: 11 minutes.  Providers location: offsite.  Patients location: [facility]  Mariusz ( Red ) ISABEL Sharp is a 63 year old male with type 2 diabetes mellitus. Video visit today for diabetes follow up.  Pt was admitted 5/17/2020-5/29/2020 and underwent a heart transplant on 5/18/2020.  Pt's hx is significant for type 2 diabetes mellitus dx 6 + years ago, HTN, hyperlipidemia, hx of ischemic heart disease s/p STEMI with ALYSSA 2019, LVAD placement, Stage 3 CKD, and obesity.  Red was also admitted 5/12-5/15/2020 for ANDREW due to nephrolithiasis complicated by hydronephrosis/ANDREW.  For his diabetes, pt is taking Ozempic 1 mg subcutaneous once a week, Lantus 34 units subcutaneous each pm and Humalog 1 unit/8 gms CHO with meals with correction scale  ( 1 unit/25 for BG > 150 ) and bedtime correction scale ( 1 unit/25 for BG > 200).   He is also taking Farxiga 10 mg each am.  Most recent AC was 7.0 % in June 2023.  Pt's A1C was 7.4 % on 5/11/2023.  Red is now using a Freestyle Libre2 sensor to monitor his blood sugars.  His sensor download data is scanned in his note below.  Pt's blood sugar is in target 72 % of he time.  On ROS today, weight loss as above.  Mild tingling in both feet. Denies foot ulcers/sores.  Pt denies blurred vision, n/v, SOB at rest,cough,fever,chest pain, abd pain, diarrhea, dysuria or hematuria.    Diabetes Care  Retinopathy:none; pt seen by Oph in Jan 2023 with no retinopathy per patient.  Nephropathy:yes- hx of CKD/ANDREW.  Neuropathy: mild tingling in both feet.  Foot Exam:no exam today.  Taking aspirin: yes.  Lipids: LDL 61 in 5/2023.  Pt is taking Crestor.  CAD: yes; s/p heart transplant on 5/18/2020.  Depression: no.  Insulin: Basal and meal time insulin with correction scale ( 1 unit/25 for BG > 150 with meals and > 200 at bedtime).    DM meds: Farxiga and Ozempic.  Testing: Freestyle Libre2 sensor.            ROS  See under  HPI.    Allergies  No Known Allergies    Medications  Current Outpatient Medications   Medication Sig Dispense Refill    ACCU-CHEK [place]TE PLUS test strip Check BG 3 times daily at meals and at bedtime. 400 strip 3    acetaminophen (TYLENOL) 325 MG tablet Take 2 tablets (650 mg) by mouth every 4 hours as needed for other (multimodal surgical pain management along with NSAIDS and opioid medication as indicated based on pain control and physical function.)      amLODIPine (NORVASC) 10 MG tablet Take 1 tablet (10 mg) by mouth daily 90 tablet 3    aspirin (ASA) 81 MG EC tablet Take 1 tablet (81 mg) by mouth daily 30 tablet 11    BD SILVANA U/F 32G X 4 MM insulin pen needle Use 6 times daily. 600 each 3    calcium carbonate 600 mg-vitamin D 400 units (CALTRATE) 600-400 MG-UNIT per tablet Take 1 tablet by mouth 2 times daily (with meals)      Continuous Blood Gluc Sensor (FREESTYLE DARRELL 2 SENSOR) MISC 1 each every 14 days Use 1 sensor every 14 days. Use to read blood sugars per 's instructions. 2 each 5    dapagliflozin (FARXIGA) 10 MG TABS tablet Take 1 tablet (10 mg) by mouth daily 90 tablet 3    everolimus (ZORTRESS) 0.5 MG tablet Take 3 tablets (1.5 mg) by mouth every morning AND 2 tablets (1 mg) every evening. 450 tablet 3    HUMALOG KWIKPEN 100 UNIT/ML soln INJECT 1 UNIT PER EVERY 8 GRAMS OF CARBS WITH MEALS AND SNACKS, PLUS CORRECTION, APPROX 70 UNITS IN 24 HOURS 75 mL 3    hydrALAZINE (APRESOLINE) 50 MG tablet Take 2.5 tablets (125 mg) by mouth 3 times daily 675 tablet 3    insulin glargine (LANTUS PEN) 100 UNIT/ML pen Inject 34 units subcutaneous at hs. 15 mL 3    loperamide (IMODIUM) 2 MG capsule Take 1 capsule (2 mg) by mouth 4 times daily as needed for diarrhea 60 capsule 0    magnesium oxide (MAG-OX) 400 (241.3 Mg) MG tablet Take 1 tablet (400 mg) by mouth daily 90 tablet 3    multivitamin w/minerals (THERA-VIT-M) tablet Take 1 tablet by mouth daily 90 tablet 3    mycophenolate (GENERIC EQUIVALENT)  250 MG capsule Take 5 capsules (1,250 mg) by mouth 2 times daily 300 capsule 11    OZEMPIC, 1 MG/DOSE, 4 MG/3ML pen Inject 1 mg subcutaneous once a week once you have taken 0.5 mg subcutaneous weekly x 1 month. 15 mL 1    pantoprazole (PROTONIX) 40 MG EC tablet Take 1 tablet (40 mg) by mouth every morning (before breakfast) 90 tablet 3    potassium chloride ER (KLOR-CON M) 20 MEQ CR tablet Take 1 tablet (20 mEq) by mouth daily 90 tablet 3    rosuvastatin (CRESTOR) 20 MG tablet Take 1 tablet (20 mg) by mouth daily 90 tablet 3    torsemide (DEMADEX) 20 MG tablet Take 1 tablet (20 mg) by mouth daily 90 tablet 3       Family History  Mother and father with hx of type 2 DM.  Social History   reports that he has never smoked. He has never used smokeless tobacco. He reports that he does not drink alcohol and does not use drugs.     Past Medical History  Past Medical History:   Diagnosis Date    Acute kidney injury (H24)     CKD (chronic kidney disease)     Coronary artery disease     Diabetes mellitus (H)     HTN (hypertension)     Hyperlipidemia     ICD (implantable cardioverter-defibrillator), single chamber- NOT dependent 7/17/2018    Ischemic cardiomyopathy     LV (left ventricular) mural thrombus     Paroxysmal atrial flutter (H)     RVF (right ventricular failure) (H)     Systolic heart failure (H)     Ventricular tachycardia (H)        Past Surgical History:   Procedure Laterality Date    BRONCHOSCOPY (RIGID OR FLEXIBLE), DIAGNOSTIC N/A 9/15/2020    Procedure: BRONCHOSCOPY, WITH BRONCHOALVEOLAR LAVAGE;  Surgeon: Elder Mejia MD;  Location:  GI    BRONCHOSCOPY (RIGID OR FLEXIBLE), DIAGNOSTIC N/A 9/17/2020    Procedure: BRONCHOSCOPY, WITH BRONCHOALVEOLAR LAVAGE;  Surgeon: Bill Lemus MD;  Location:  OR    BRONCHOSCOPY RIGID OR FLEXIBLE W/TRANSENDOSCOPIC ENDOBRONCHIAL ULTRASOUND GUIDED Right 9/8/2020    Procedure: BRONCHOSCOPY, WITH ENDOBRONCHIAL ULTRASOUND;  Surgeon: Donny Mercedes MD;  [facility]:  [facility]    COLONOSCOPY N/A 12/7/2018    Procedure: COLONOSCOPY;  Surgeon: Krish Mercado MD;  Location: [facility]    CV CORONARY ANGIOGRAM N/A 6/23/2021    Procedure: CV CORONARY ANGIOGRAM;  Surgeon: Brian Long MD;  [facility]    CV CORONARY ANGIOGRAM N/A 5/18/2022    Procedure: Coronary Angiogram [[id number]];  Surgeon: Khris Mcclelland MD;  [facility]    CV CORONARY ANGIOGRAM N/A 5/11/2023    Procedure: Coronary Angiogram;  Surgeon: Khris Mcclelland MD;  [facility]    CV HEART BIOPSY N/A 5/24/2020    Procedure: Heart Biopsy;  Surgeon: Kit Bacon MD;  Location: [facility]ATH [facility]    CV HEART BIOPSY N/A 6/1/2020    Procedure: CV HEART BIOPSY;  Surgeon: Kit Bacon MD;  Location: [facility]    CV HEART BIOPSY N/A 6/8/2020    Procedure: CV HEART BIOPSY;  Surgeon: Kit Bacon MD;  [facility]    CV HEART BIOPSY N/A 6/15/2020    Procedure: CV HEART BIOPSY;  Surgeon: Kit Bacon MD;  Location: [facility]ATH [facility]    CV HEART BIOPSY N/A 6/30/2020    Procedure: CV HEART BIOPSY;  Surgeon: Kit Bacon MD;  Location: [facility]    CV HEART BIOPSY N/A 7/14/2020    Procedure: CV HEART BIOPSY;  Surgeon: Brian Long MD;  Location: [facility] C[facility]    CV HEART BIOPSY N/A 7/29/2020    Procedure: CV HEART BIOPSY;  Surgeon: Momo Hunt MD;  Location: [facility]    CV HEART BIOPSY N/A 8/13/2020    Procedure: CV HEART BIOPSY;  Surgeon: Khris Mcclelland MD;  Location: [facility]    CV HEART BIOPSY N/A 8/26/2020    Procedure: CV HEART BIOPSY;  Surgeon: Momo Hunt MD;  Location:  HEART CARDIAC CATH LAB    CV HEART BIOPSY N/A 9/30/2020    Procedure: CV HEART BIOPSY;  Surgeon: Artemio Ulloa MD;  Location: [facility]  CARDIAC CATH LAB    CV HEART BIOPSY N/A 10/29/2020    Procedure: CV HEART BIOPSY;  Surgeon: Kit Bacon MD;  Location: [facility] C[facility] LAB    CV HEART BIOPSY N/A 1/5/2021    Procedure: CV HEART BIOPSY;  Surgeon: Carlin Garcia MD;  [facility]    CV HEART BIOPSY N/A 6/23/2021    Procedure: CV HEART BIOPSY;  Surgeon: Brian Long MD;  [facility]    CV HEART BIOPSY N/A 10/26/2021    Procedure: CV HEART BIOPSY;  Surgeon: Kit Bacon MD;  Location:  HEART CARDIAC CATH LAB    CV HEART BIOPSY N/A 5/18/2022    Procedure: Heart Biopsy;  Surgeon: Khris Mcclelland MD;  Location:  HEART CARDIAC CATH LAB    CV HEART BIOPSY N/A 5/11/2023    Procedure: Heart Biopsy;  Surgeon: Khris Mcclelland MD;  Location:  HEART CARDIAC CATH LAB    CV RIGHT HEART CATH MEASUREMENTS RECORDED N/A 2/19/2019    Procedure: RHC;  Surgeon: Brian Long MD;  Location:  HEART CARDIAC CATH LAB    CV RIGHT HEART CATH MEASUREMENTS RECORDED N/A 7/5/2019    Procedure: CV RIGHT HEART CATH;  Surgeon: Khris Mcclelland MD;  Location:  HEART CARDIAC CATH LAB    CV RIGHT HEART CATH MEASUREMENTS RECORDED N/A 5/24/2020    Procedure: Right Heart Cath;  Surgeon: Kit Bacon MD;  Location:  HEART CARDIAC CATH LAB    CV RIGHT HEART CATH MEASUREMENTS RECORDED N/A 6/1/2020    Procedure: CV RIGHT HEART CATH;  Surgeon: Kit Bacon MD;  Location:  HEART CARDIAC CATH LAB    CV RIGHT HEART CATH MEASUREMENTS RECORDED N/A 6/8/2020    Procedure: CV RIGHT HEART CATH;  Surgeon: Kit Bacon MD;  Location:  HEART CARDIAC CATH LAB    CV RIGHT HEART CATH MEASUREMENTS RECORDED N/A 6/15/2020    Procedure: CV RIGHT HEART CATH;  Surgeon: Kit Bacon MD;  Location:  HEART CARDIAC CATH LAB    CV RIGHT HEART CATH MEASUREMENTS RECORDED N/A 6/30/2020    Procedure: CV RIGHT HEART  CATH;  Surgeon: Kit Bacon MD;  Location:  HEART CARDIAC CATH LAB    CV RIGHT HEART CATH MEASUREMENTS RECORDED N/A 7/14/2020    Procedure: CV RIGHT HEART CATH;  Surgeon: Brian Long MD;  Location:  HEART CARDIAC CATH LAB    CV RIGHT HEART CATH MEASUREMENTS RECORDED N/A 7/29/2020    Procedure: CV RIGHT HEART CATH;  Surgeon: Momo Hunt MD;  Location:  HEART CARDIAC CATH LAB    CV RIGHT HEART CATH MEASUREMENTS RECORDED N/A 8/13/2020    Procedure: CV RIGHT HEART CATH;  Surgeon: Khris Mcclelland MD;  Location:  HEART CARDIAC CATH LAB    CV RIGHT HEART CATH MEASUREMENTS RECORDED N/A 8/26/2020    Procedure: CV RIGHT HEART CATH;  Surgeon: Momo Hunt MD;  Location:  HEART CARDIAC CATH LAB    CV RIGHT HEART CATH MEASUREMENTS RECORDED N/A 9/30/2020    Procedure: Right Heart Cath;  Surgeon: Artemio Ulloa MD;  Location:  HEART CARDIAC CATH LAB    CV RIGHT HEART CATH MEASUREMENTS RECORDED N/A 10/29/2020    Procedure: CV RIGHT HEART CATH;  Surgeon: Kit Bacon MD;  Location:  HEART CARDIAC CATH LAB    CV RIGHT HEART CATH MEASUREMENTS RECORDED N/A 1/5/2021    Procedure: CV RIGHT HEART CATH;  Surgeon: Carlin Garcia MD;  Location:  HEART CARDIAC CATH LAB    CV RIGHT HEART CATH MEASUREMENTS RECORDED N/A 6/23/2021    Procedure: CV RIGHT HEART CATH;  Surgeon: Brian Long MD;  Location:  HEART CARDIAC CATH LAB    CV RIGHT HEART CATH MEASUREMENTS RECORDED N/A 10/26/2021    Procedure: CV RIGHT HEART CATH;  Surgeon: Kit Bacon MD;  Location:  HEART CARDIAC CATH LAB    CV RIGHT HEART CATH MEASUREMENTS RECORDED N/A 5/18/2022    Procedure: Right Heart Catheterization;  Surgeon: Khris Mcclelland MD;  Location:  HEART CARDIAC CATH LAB    CV RIGHT HEART CATH MEASUREMENTS RECORDED N/A 5/11/2023    Procedure: Right Heart Catheterization;  Surgeon: Khris Mcclelland MD;  Location:  HEART CARDIAC CATH LAB     ENDOBRONCHIAL ULTRASOUND FLEXIBLE N/A 9/17/2020    Procedure: BRONCHOSCOPY, flexible, endobronchial ultrasound with transbronchial biopsies, endobronchial biopsies;  Surgeon: Bill Lemus MD;  [facility]     PRQ TRLUML CORONARY ANGIOPLASTY ADDL BRANCH      PCI and ALYSSA to ostial LAD on 6/27/18    IMPLANT AUTOMATIC IMPLANTABLE CARDIOVERTER DEFIBRILLATOR      INSERT VENTRICULAR ASSIST DEVICE LEFT (HEARTMATE II) N/A 12/31/2018    Procedure: Median Sternotomy, On Cardiopulmonary Bypass Pump, Insertion of Left Ventricular Assist Device (Heartmate III);  Surgeon: Kamaljit Baker MD;  [facility]    PICC DOUBLE LUMEN PLACEMENT Right 05/22/2020    5FR DL PICC inserted at right basilic vein, length 38cm.    TRANSPLANT HEART RECIPIENT AFTER HEART MATE N/A 5/18/2020    Procedure: REDO MEDIAN STERNOTOMY, LYSIS OF ADHESIONS, ON CARDIOPULMONARY BYPASS PUMP, HEART TRANSPLANT RECIPIENT, REMOVAL OF LEFT VENTRICULAR ASSIST DEVICE, REMOVAL OF AUTOMATED IMPLANTABLE CARDIOVERTER DEFIBRILLATOR;  Surgeon: Elder Willis MD;  [facility]       Physical Exam    No exam today.    RESULTS  Creatinine   Date Value Ref Range Status   05/11/2023 1.75 (H) 0.67 - 1.17 mg/dL Final   06/23/2021 1.93 (H) 0.66 - 1.25 mg/dL Final     GFR Estimate   Date Value Ref Range Status   05/11/2023 44 (L) >60 mL/min/1.73m2 Final     Comment:     eGFR calculated using 2021 CKD-EPI equation.   06/23/2021 37 (L) >60 mL/min/[1.73_m2] Final     Comment:     Non  GFR Calc  Starting 12/18/2018, serum creatinine based estimated GFR (eGFR) will be   calculated using the Chronic Kidney Disease Epidemiology Collaboration   (CKD-EPI) equation.       Hemoglobin A1C   Date Value Ref Range Status   05/11/2023 7.4 (H) <5.7 % Final     Comment:     Normal <5.7%   Prediabetes 5.7-6.4%    Diabetes 6.5% or higher     Note: Adopted from ADA consensus guidelines.   06/23/2021 10.3 (H) 0 - 5.6 % Final     Comment:     Normal <5.7% Prediabetes  5.7-6.4%  Diabetes 6.5% or higher - adopted from ADA   consensus guidelines.       Potassium   Date Value Ref Range Status   05/11/2023 3.8 3.4 - 5.3 mmol/L Final   05/18/2022 3.5 3.4 - 5.3 mmol/L Final   06/23/2021 3.6 3.4 - 5.3 mmol/L Final     ALT   Date Value Ref Range Status   05/11/2023 19 10 - 50 U/L Final   06/23/2021 26 0 - 70 U/L Final     AST   Date Value Ref Range Status   05/11/2023 23 10 - 50 U/L Final   06/23/2021 22 0 - 45 U/L Final     TSH   Date Value Ref Range Status   03/03/2021 1.87 0.40 - 4.00 mU/L Final     T4 Free   Date Value Ref Range Status   03/03/2021 1.08 0.76 - 1.46 ng/dL Final       Cholesterol   Date Value Ref Range Status   05/11/2023 153 <200 mg/dL Final   02/15/2023 186 <200 mg/dL Final   06/23/2021 168 <200 mg/dL Final   10/29/2020 161 <200 mg/dL Final     HDL Cholesterol   Date Value Ref Range Status   06/23/2021 49 >39 mg/dL Final   10/29/2020 51 >39 mg/dL Final     Direct Measure HDL   Date Value Ref Range Status   05/11/2023 36 (L) >=40 mg/dL Final   02/15/2023 39 (L) >=40 mg/dL Final     LDL Cholesterol Calculated   Date Value Ref Range Status   05/11/2023 61 <=100 mg/dL Final   02/15/2023 106 (H) <=100 mg/dL Final   06/23/2021 75 <100 mg/dL Final     Comment:     Desirable:       <100 mg/dl   10/29/2020 81 <100 mg/dL Final     Comment:     Desirable:       <100 mg/dl     LDL Cholesterol Direct   Date Value Ref Range Status   10/26/2021 105 (H) <100 mg/dL Final     Comment:     Age 0-19 years:  Desirable: 0-110 mg/dL   Borderline high: 110-129 mg/dL   High: >= 130 mg/dL    Age 20 years and older:  Desirable: <100mg/dL  Above desirable: 100-129 mg/dL   Borderline high: 130-159 mg/dL   High: 160-189 mg/dL   Very high: >= 190 mg/dL     Triglycerides   Date Value Ref Range Status   05/11/2023 280 (H) <150 mg/dL Final   02/15/2023 206 (H) <150 mg/dL Final   06/23/2021 220 (H) <150 mg/dL Final     Comment:     Borderline high:  150-199 mg/dl  High:             200-499 mg/dl  Very  high:       >499 mg/dl     10/29/2020 144 <150 mg/dL Final       ASSESSMENT/PLAN:      1.  TYPE 2 DIABETES MELLITUS: Glycemic control has improved with addition of Ozempic.  Red has lost 14 lbs and most recent A1C was 7.0 % on 11/13/2023.   Continue Ozempic 1 mg subcutaneous once a week.  Pt denies hx of pancreatitis or gastroparesis.  Continue Lantus 34 units subcutaneous at hs and continue Humalog 1 unit/8 gms CHO with meals and snacks,plus correction insulin 1 unit/25 for BG > 140 with meals and > 200 at bedtime.  Continue Farxiga 10 mg daily.    Again, I reviewed how Farxiga works and possible side effects including dehydration, yeast infection and possible UTI.   Reminded him to drink plenty of water.  Avoid use of Metformin due to CKD.  Continue to use Freestyle Libre2 sensor to monitor blood sugars.  Pt was seen by Oph in Jan 2023 without retinopathy per patient.  He reports mild tingling in his feet.  No foot ulcers at this time.  He is taking ASA daily.    2.  CKD/ANDREW: Creat 1.80 with GFR 43 mL/min on 6/1/2023.  Avoid use of Metformin.  Pt taking Farxiga.    3.  HX OF ISCHEMIC CARDIOMYOPATHY: S/P heart transplant on 5/18/2020 and followed closely by transplant staff here.    4.  HTN: No vitals today. Continue current RXs.    5.  WEIGHT GAIN: Some weight loss with addition of Ozempic.  Reminded patient to make healthy food choices, reduce food portions with meals, try to avoid snacking and increase activity.     6  FOLLOW UP: with me in 3 months.      Time spent reviewing chart, labs and Freestyle Libre2 sensor data today = 6 minutes.  Time for video visit today = 11 minutes.  Time for documentation today = 15 minutes.    TOTAL TIME FOR VIDEO VISIT TODAY= 32 minutes.    Jeannette Schafer PA-C

## 2023-11-16 NOTE — NURSING NOTE
Is the patient currently in the state of [place]? YES    Visit mode:VIDEO    If the visit is dropped, the patient can be reconnected by: VIDEO VISIT: Send to e-mail at: [email or web address]    Will anyone else be joining the visit? NO  (If patient encounters technical issues they should call [phone])    How would you like to obtain your AVS? MyChart    Are changes needed to the allergy or medication list? No    Reason for visit: RECHECK Shelby Kocher VVF

## 2023-12-03 DIAGNOSIS — Z94.1 STATUS POST HEART TRANSPLANTATION (H): ICD-10-CM

## 2023-12-07 RX ORDER — ROSUVASTATIN CALCIUM 20 MG/1
20 TABLET, COATED ORAL DAILY
Qty: 90 TABLET | Refills: 3 | Status: SHIPPED | OUTPATIENT
Start: 2023-12-07

## 2023-12-22 ENCOUNTER — TELEPHONE (OUTPATIENT)
Dept: TRANSPLANT | Facility: CLINIC | Age: 63
End: 2023-12-22
Payer: COMMERCIAL

## 2023-12-22 DIAGNOSIS — Z94.1 STATUS POST HEART TRANSPLANTATION (H): ICD-10-CM

## 2023-12-22 NOTE — TELEPHONE ENCOUNTER
Patient Call: Medication Refill  Route to LPN  Instruct the patient to first contact their pharmacy. If they have called their pharmacy and require further assistance, route to LPN.    Pharmacy Name: [facility]  Pharmacy Location: mail order  Name of Medication: Zortress Dose:0.5 ,g   When will the patient be out of this medication?: Greater than 3 days (Route standard priority)

## 2023-12-22 NOTE — TELEPHONE ENCOUNTER
Mariusz called to have a script filled for zortress. I called the pharmacy, and they need a script from us, and the patient to fill out his portion of the form. I advised Mariusz to ask for partial refills from the pharmacy if he should run out during re-enrollment. I asked Walt to send him his portion of the form, and I advised Mariusz to watch for the form.

## 2023-12-26 RX ORDER — EVEROLIMUS 0.5 MG/1
TABLET ORAL
Qty: 450 TABLET | Refills: 3 | Status: SHIPPED | OUTPATIENT
Start: 2023-12-26 | End: 2024-06-12

## 2024-01-04 DIAGNOSIS — Z94.1 HEART REPLACED BY TRANSPLANT (H): ICD-10-CM

## 2024-01-04 NOTE — TELEPHONE ENCOUNTER
FREE DRUG APPLICATION APPROVED    Medication: ZORTRESS 0.5 MG PO TABS  Program Name:  [facility]  Effective Date:    Expiration Date:    Pharmacy Filling the Rx: [facility]  Patient Notified: Waiting for approval fax to contact patient, [facility] was resending  Additional Information: Waiting for approval fax to contact patient, [facility] was resending

## 2024-01-05 RX ORDER — PANTOPRAZOLE SODIUM 40 MG/1
40 TABLET, DELAYED RELEASE ORAL
Qty: 90 TABLET | Refills: 3 | Status: SHIPPED | OUTPATIENT
Start: 2024-01-05

## 2024-01-24 DIAGNOSIS — Z94.1 HEART REPLACED BY TRANSPLANT (H): Primary | ICD-10-CM

## 2024-01-25 ENCOUNTER — TELEPHONE (OUTPATIENT)
Dept: TRANSPLANT | Facility: CLINIC | Age: 64
End: 2024-01-25
Payer: COMMERCIAL

## 2024-01-25 NOTE — TELEPHONE ENCOUNTER
Cath Lab Case Request/Order    Location: [address] [address] [facility] Waiting Room    Procedure: Coronary Angiogram    Procedure Date: 5/13/2024    Patient Arrival Time: 9am    Procedure Time: 10:30am    Ordering Provider: Dr. Gerardo Stone    Performing Cardiologist:  ZEYAD    Inpatient Bed Needed: No    Post-  Procedure ANNE-MARIE appointment scheduled (1 - 2 weeks): NO    Communicated Patient Instructions:     NPO, nothing to eat 8 hours and drink 2 hours before arrival time: Yes     , need to arrange a ride home - unable to drive post- procedure: Yes     Adult at home, need a responsible adult to stay with patient 24 hours post- procedure: YES    Appointment was scheduled: Luc[facility]    Patient expressed understanding of above instructions and denied further questions at this time.    Macey Rhodes

## 2024-03-27 ENCOUNTER — TELEPHONE (OUTPATIENT)
Dept: TRANSPLANT | Facility: CLINIC | Age: 64
End: 2024-03-27
Payer: COMMERCIAL

## 2024-03-27 DIAGNOSIS — Z94.1 HEART REPLACED BY TRANSPLANT (H): ICD-10-CM

## 2024-03-27 RX ORDER — HYDRALAZINE HYDROCHLORIDE 50 MG/1
125 TABLET, FILM COATED ORAL 3 TIMES DAILY
Qty: 675 TABLET | Refills: 3 | Status: SHIPPED | OUTPATIENT
Start: 2024-03-27 | End: 2024-10-03

## 2024-03-28 DIAGNOSIS — E11.65 TYPE 2 DIABETES MELLITUS WITH HYPERGLYCEMIA, WITH LONG-TERM CURRENT USE OF INSULIN (H): ICD-10-CM

## 2024-03-28 DIAGNOSIS — Z79.4 TYPE 2 DIABETES MELLITUS WITH HYPERGLYCEMIA, WITH LONG-TERM CURRENT USE OF INSULIN (H): ICD-10-CM

## 2024-03-28 NOTE — TELEPHONE ENCOUNTER
LVD:  11/16/2023  [facility] Jeannette Orta PA-C  Endocrinology, Diabetes, and Metabolism Type 2 diabetes mellitus with hyperglycemia, with long-term current use of insulin (H)     Refilled per protocol.    
27.9

## 2024-04-08 NOTE — Clinical Note
dry, intact, no bleeding and no hematoma. 7 Fr sheath removed from the RIJ vein. Manual pressure held. Hemostasis obtained. Band aid applied.    Detail Level: Zone Add High Risk Medication Management Associated Diagnosis?: No Patient Reported Weight(Optional But Include Units): 78lb

## 2024-04-18 NOTE — TELEPHONE ENCOUNTER
Could not contact for 6 month post transplant medication review.  Appears adherent, tacro goal  6-8 level 10/29/20 6.8 at goal.    Shukri Felton [facility]  Specialty Pharmacist [phone]    
actual/standing

## 2024-05-09 ENCOUNTER — TELEPHONE (OUTPATIENT)
Dept: TRANSPLANT | Facility: CLINIC | Age: 64
End: 2024-05-09

## 2024-05-09 DIAGNOSIS — E11.9 DIABETES MELLITUS, TYPE 2 (H): ICD-10-CM

## 2024-05-09 DIAGNOSIS — Z13.220 LIPID SCREENING: ICD-10-CM

## 2024-05-09 DIAGNOSIS — Z94.1 HEART REPLACED BY TRANSPLANT (H): Primary | ICD-10-CM

## 2024-05-09 RX ORDER — LIDOCAINE 40 MG/G
CREAM TOPICAL
Status: CANCELLED | OUTPATIENT
Start: 2024-05-09

## 2024-05-09 NOTE — TELEPHONE ENCOUNTER
Pt called to go over instructions for visit next week.  Pt was started on Ozempic, and took his dose on Wed.  Visit/procedures moved to 6/7,and reminder set to call pt one week prior to hold Ozempic.  Pt states understanding.

## 2024-06-03 ENCOUNTER — TELEPHONE (OUTPATIENT)
Dept: TRANSPLANT | Facility: CLINIC | Age: 64
End: 2024-06-03
Payer: COMMERCIAL

## 2024-06-03 NOTE — TELEPHONE ENCOUNTER
Follow up on [employer] message that was sent.  Pt will HOLD Ozempic this week in prep for Friday cath.  Pt has a  after angio.  12 hour drug trough reviewed for Evero.  NPO after midnight, and only take half of Lantus on Thursday night.    Pt states understanding all instructions.    Pre-procedure instructions - Coronary Angiogram  Patient Education    Please plan on being at the hospital all day.  At any time, emergencies and/or urgent cases may come up which could delay the start of your procedure.    Pre-procedure instructions - Coronary Angiogram  Shower in the evening before or the morning of the procedure  No solid food for 8 hours prior and nothing to drink 2 hours prior to arrival time  You can take your morning medications (except for diabetic and blood thinners) with sips of water.  Take 325 mg of Asprin 24 hours prior to the procedure and the morning of procedure.   You will need to arrange a ride to drop you off and pick you up, as you will be unable to drive home.  Prior to discharge you may be required to lay flat for approximately 2-4 hours in the recovery unit to ensure proper clotting of the artery. You will need a responsible adult to stay with you for 24 hours post-procedure.              Diabetic Medication Instructions  Hold oral diabetic medication in morning of your procedure and for 48 hours after IV contrast is given  Typical instructions for insulin diabetic medication holding are below. However, please reach out to your Primary Care Provider or Endocrinologist for specific instructions  DO NOT take any oral diabetic medication, short-acting diabetes medications/insulin, humalog or regular insulin the morning of your test  Take   dose of long-acting insulin (Lantus, Levemir) the day of your test  Remember to bring your glucometer and insulin with you to take after your test if needed  GLP-1 Agonists Instructions  DO NOT take injectable GLP-1 agonists semaglutide (Ozempic, Wegovy),  dulaglutide (Trulicity), exenatide ER (Bydureon), tirzepatide (Mounjaro), or oral semaglutide (Rybelsus) for 7 days prior your procedure  Hold once daily injectable GLP-1 agonists exenatide (Byetta), liraglutide (Saxenda, Victoza), lixisenatide (Soligua) the day before and day of your procedure                  Anticoagulation Medication Instructions   NA

## 2024-06-04 ENCOUNTER — TELEPHONE (OUTPATIENT)
Dept: ENDOCRINOLOGY | Facility: CLINIC | Age: 64
End: 2024-06-04
Payer: COMMERCIAL

## 2024-06-04 DIAGNOSIS — Z79.4 TYPE 2 DIABETES MELLITUS WITH HYPERGLYCEMIA, WITH LONG-TERM CURRENT USE OF INSULIN (H): Primary | ICD-10-CM

## 2024-06-04 DIAGNOSIS — E11.65 TYPE 2 DIABETES MELLITUS WITH HYPERGLYCEMIA, WITH LONG-TERM CURRENT USE OF INSULIN (H): Primary | ICD-10-CM

## 2024-06-04 DIAGNOSIS — Z94.1 STATUS POST HEART TRANSPLANTATION (H): ICD-10-CM

## 2024-06-04 RX ORDER — MYCOPHENOLATE MOFETIL 250 MG/1
1250 CAPSULE ORAL 2 TIMES DAILY
Qty: 300 CAPSULE | Refills: 11 | Status: SHIPPED | OUTPATIENT
Start: 2024-06-04

## 2024-06-04 NOTE — TELEPHONE ENCOUNTER
Per Leticia Schafer PA-C, referral to MTM to possibly increase Ozempic to 2 mg. Next available opening is okay (6-8 weeks).    Warner Cedeño, PharmD, [facility]  Endocrine & Diabetes [place] Pharmacist  [address] [place] [address]

## 2024-06-06 DIAGNOSIS — Z94.1 TRANSPLANTED HEART (H): Primary | ICD-10-CM

## 2024-06-06 RX ORDER — ASPIRIN 325 MG
325 TABLET ORAL ONCE
Status: CANCELLED | OUTPATIENT
Start: 2024-06-06 | End: 2024-06-06

## 2024-06-06 RX ORDER — LIDOCAINE 40 MG/G
CREAM TOPICAL
Status: CANCELLED | OUTPATIENT
Start: 2024-06-06

## 2024-06-06 RX ORDER — SODIUM CHLORIDE 9 MG/ML
INJECTION, SOLUTION INTRAVENOUS CONTINUOUS
Status: CANCELLED | OUTPATIENT
Start: 2024-06-06

## 2024-06-06 RX ORDER — POTASSIUM CHLORIDE 1500 MG/1
40 TABLET, EXTENDED RELEASE ORAL
Status: CANCELLED | OUTPATIENT
Start: 2024-06-06

## 2024-06-06 RX ORDER — POTASSIUM CHLORIDE 1500 MG/1
20 TABLET, EXTENDED RELEASE ORAL
Status: CANCELLED | OUTPATIENT
Start: 2024-06-06

## 2024-06-06 RX ORDER — ASPIRIN 81 MG/1
243 TABLET, CHEWABLE ORAL ONCE
Status: CANCELLED | OUTPATIENT
Start: 2024-06-06

## 2024-06-07 ENCOUNTER — LAB (OUTPATIENT)
Dept: LAB | Facility: CLINIC | Age: 64
End: 2024-06-07
Payer: COMMERCIAL

## 2024-06-07 ENCOUNTER — HOSPITAL ENCOUNTER (OUTPATIENT)
Dept: CARDIOLOGY | Facility: CLINIC | Age: 64
Discharge: HOME | End: 2024-06-07
Attending: INTERNAL MEDICINE
Payer: COMMERCIAL

## 2024-06-07 ENCOUNTER — HOSPITAL ENCOUNTER (OUTPATIENT)
Facility: CLINIC | Age: 64
Discharge: HOME | End: 2024-06-07
Attending: INTERNAL MEDICINE | Admitting: INTERNAL MEDICINE
Payer: COMMERCIAL

## 2024-06-07 ENCOUNTER — OFFICE VISIT (OUTPATIENT)
Dept: CARDIOLOGY | Facility: CLINIC | Age: 64
End: 2024-06-07
Attending: INTERNAL MEDICINE
Payer: COMMERCIAL

## 2024-06-07 ENCOUNTER — HOSPITAL ENCOUNTER (OUTPATIENT)
Dept: GENERAL RADIOLOGY | Facility: CLINIC | Age: 64
Discharge: HOME | End: 2024-06-07
Attending: INTERNAL MEDICINE | Admitting: INTERNAL MEDICINE

## 2024-06-07 ENCOUNTER — APPOINTMENT (OUTPATIENT)
Dept: MEDSURG UNIT | Facility: CLINIC | Age: 64
End: 2024-06-07
Attending: INTERNAL MEDICINE
Payer: COMMERCIAL

## 2024-06-07 VITALS
BODY MASS INDEX: 33.8 KG/M2 | HEART RATE: 106 BPM | OXYGEN SATURATION: 98 % | WEIGHT: 196.3 LBS | SYSTOLIC BLOOD PRESSURE: 138 MMHG | DIASTOLIC BLOOD PRESSURE: 93 MMHG

## 2024-06-07 VITALS
SYSTOLIC BLOOD PRESSURE: 116 MMHG | DIASTOLIC BLOOD PRESSURE: 88 MMHG | OXYGEN SATURATION: 96 % | TEMPERATURE: 97.6 F | WEIGHT: 193.1 LBS | HEART RATE: 105 BPM | BODY MASS INDEX: 32.97 KG/M2 | RESPIRATION RATE: 16 BRPM | HEIGHT: 64 IN

## 2024-06-07 DIAGNOSIS — N18.32 STAGE 3B CHRONIC KIDNEY DISEASE (H): ICD-10-CM

## 2024-06-07 DIAGNOSIS — Z94.1 HEART REPLACED BY TRANSPLANT (H): ICD-10-CM

## 2024-06-07 DIAGNOSIS — Z98.890 S/P CORONARY ANGIOGRAM: Primary | ICD-10-CM

## 2024-06-07 DIAGNOSIS — Z13.220 LIPID SCREENING: ICD-10-CM

## 2024-06-07 DIAGNOSIS — I10 BENIGN ESSENTIAL HYPERTENSION: ICD-10-CM

## 2024-06-07 DIAGNOSIS — Z94.1 HEART REPLACED BY TRANSPLANT (H): Primary | ICD-10-CM

## 2024-06-07 DIAGNOSIS — E11.9 DIABETES MELLITUS, TYPE 2 (H): ICD-10-CM

## 2024-06-07 DIAGNOSIS — Z12.5 PROSTATE CANCER SCREENING: ICD-10-CM

## 2024-06-07 DIAGNOSIS — Z94.1 TRANSPLANTED HEART (H): ICD-10-CM

## 2024-06-07 DIAGNOSIS — E78.2 MIXED HYPERLIPIDEMIA: ICD-10-CM

## 2024-06-07 LAB
ALBUMIN SERPL BCG-MCNC: 4.4 G/DL (ref 3.5–5.2)
ALP SERPL-CCNC: 76 U/L (ref 40–150)
ALT SERPL W P-5'-P-CCNC: 28 U/L (ref 0–70)
ANION GAP SERPL CALCULATED.3IONS-SCNC: 12 MMOL/L (ref 7–15)
AST SERPL W P-5'-P-CCNC: 30 U/L (ref 0–45)
BASOPHILS # BLD AUTO: 0.1 10E3/UL (ref 0–0.2)
BASOPHILS NFR BLD AUTO: 1 %
BILIRUB SERPL-MCNC: 1 MG/DL
BUN SERPL-MCNC: 23.2 MG/DL (ref 8–23)
CALCIUM SERPL-MCNC: 9 MG/DL (ref 8.8–10.2)
CHLORIDE SERPL-SCNC: 104 MMOL/L (ref 98–107)
CHOLEST SERPL-MCNC: 178 MG/DL
CK SERPL-CCNC: 204 U/L (ref 39–308)
CMV DNA SPEC NAA+PROBE-ACNC: NOT DETECTED IU/ML
CREAT SERPL-MCNC: 1.47 MG/DL (ref 0.67–1.17)
DEPRECATED HCO3 PLAS-SCNC: 23 MMOL/L (ref 22–29)
EBV DNA SERPL NAA+PROBE-ACNC: NOT DETECTED IU/ML
EGFRCR SERPLBLD CKD-EPI 2021: 54 ML/MIN/1.73M2
EOSINOPHIL # BLD AUTO: 0.2 10E3/UL (ref 0–0.7)
EOSINOPHIL NFR BLD AUTO: 3 %
ERYTHROCYTE [DISTWIDTH] IN BLOOD BY AUTOMATED COUNT: 13.8 % (ref 10–15)
EVEROLIMUS BLD-MCNC: 5.3 UG/L (ref 3–8)
FASTING STATUS PATIENT QL REPORTED: YES
FASTING STATUS PATIENT QL REPORTED: YES
GLUCOSE BLDC GLUCOMTR-MCNC: 121 MG/DL (ref 70–99)
GLUCOSE SERPL-MCNC: 166 MG/DL (ref 70–99)
HBA1C MFR BLD: 7.3 %
HCT VFR BLD AUTO: 48.1 % (ref 40–53)
HDLC SERPL-MCNC: 45 MG/DL
HGB BLD-MCNC: 15.7 G/DL (ref 13.3–17.7)
IMM GRANULOCYTES # BLD: 0 10E3/UL
IMM GRANULOCYTES NFR BLD: 0 %
LDLC SERPL CALC-MCNC: 92 MG/DL
LVEF ECHO: NORMAL
LYMPHOCYTES # BLD AUTO: 1 10E3/UL (ref 0.8–5.3)
LYMPHOCYTES NFR BLD AUTO: 16 %
MAGNESIUM SERPL-MCNC: 2 MG/DL (ref 1.7–2.3)
MCH RBC QN AUTO: 27.6 PG (ref 26.5–33)
MCHC RBC AUTO-ENTMCNC: 32.6 G/DL (ref 31.5–36.5)
MCV RBC AUTO: 85 FL (ref 78–100)
MONOCYTES # BLD AUTO: 0.7 10E3/UL (ref 0–1.3)
MONOCYTES NFR BLD AUTO: 12 %
NEUTROPHILS # BLD AUTO: 3.9 10E3/UL (ref 1.6–8.3)
NEUTROPHILS NFR BLD AUTO: 68 %
NONHDLC SERPL-MCNC: 133 MG/DL
NRBC # BLD AUTO: 0 10E3/UL
NRBC BLD AUTO-RTO: 0 /100
PHOSPHATE SERPL-MCNC: 3.2 MG/DL (ref 2.5–4.5)
PLATELET # BLD AUTO: 219 10E3/UL (ref 150–450)
POTASSIUM SERPL-SCNC: 4.1 MMOL/L (ref 3.4–5.3)
PROT SERPL-MCNC: 7 G/DL (ref 6.4–8.3)
PSA SERPL DL<=0.01 NG/ML-MCNC: 8.61 NG/ML (ref 0–4.5)
RBC # BLD AUTO: 5.69 10E6/UL (ref 4.4–5.9)
SODIUM SERPL-SCNC: 139 MMOL/L (ref 135–145)
TME LAST DOSE: NORMAL H
TME LAST DOSE: NORMAL H
TRIGL SERPL-MCNC: 206 MG/DL
WBC # BLD AUTO: 5.8 10E3/UL (ref 4–11)

## 2024-06-07 PROCEDURE — 99000 SPECIMEN HANDLING OFFICE-LAB: CPT | Performed by: PATHOLOGY

## 2024-06-07 PROCEDURE — 82550 ASSAY OF CK (CPK): CPT | Performed by: PATHOLOGY

## 2024-06-07 PROCEDURE — 999N000208 ECHOCARDIOGRAM COMPLETE

## 2024-06-07 PROCEDURE — 36415 COLL VENOUS BLD VENIPUNCTURE: CPT | Performed by: PATHOLOGY

## 2024-06-07 PROCEDURE — C1894 INTRO/SHEATH, NON-LASER: HCPCS | Performed by: INTERNAL MEDICINE

## 2024-06-07 PROCEDURE — 99152 MOD SED SAME PHYS/QHP 5/>YRS: CPT | Mod: GC | Performed by: INTERNAL MEDICINE

## 2024-06-07 PROCEDURE — 258N000003 HC RX IP 258 OP 636: Mod: JZ | Performed by: INTERNAL MEDICINE

## 2024-06-07 PROCEDURE — 80169 DRUG ASSAY EVEROLIMUS: CPT | Performed by: NURSE PRACTITIONER

## 2024-06-07 PROCEDURE — 255N000002 HC RX 255 OP 636: Performed by: INTERNAL MEDICINE

## 2024-06-07 PROCEDURE — G0463 HOSPITAL OUTPT CLINIC VISIT: HCPCS | Mod: 25 | Performed by: NURSE PRACTITIONER

## 2024-06-07 PROCEDURE — 82962 GLUCOSE BLOOD TEST: CPT

## 2024-06-07 PROCEDURE — 99153 MOD SED SAME PHYS/QHP EA: CPT | Performed by: INTERNAL MEDICINE

## 2024-06-07 PROCEDURE — 87799 DETECT AGENT NOS DNA QUANT: CPT | Performed by: NURSE PRACTITIONER

## 2024-06-07 PROCEDURE — 999N000142 HC STATISTIC PROCEDURE PREP ONLY

## 2024-06-07 PROCEDURE — 93306 TTE W/DOPPLER COMPLETE: CPT | Mod: 26 | Performed by: INTERNAL MEDICINE

## 2024-06-07 PROCEDURE — 86833 HLA CLASS II HIGH DEFIN QUAL: CPT | Performed by: NURSE PRACTITIONER

## 2024-06-07 PROCEDURE — 83735 ASSAY OF MAGNESIUM: CPT | Performed by: PATHOLOGY

## 2024-06-07 PROCEDURE — 93460 R&L HRT ART/VENTRICLE ANGIO: CPT | Performed by: INTERNAL MEDICINE

## 2024-06-07 PROCEDURE — G0103 PSA SCREENING: HCPCS | Performed by: PATHOLOGY

## 2024-06-07 PROCEDURE — 86352 CELL FUNCTION ASSAY W/STIM: CPT | Performed by: NURSE PRACTITIONER

## 2024-06-07 PROCEDURE — 93460 R&L HRT ART/VENTRICLE ANGIO: CPT | Mod: 26 | Performed by: INTERNAL MEDICINE

## 2024-06-07 PROCEDURE — 99214 OFFICE O/P EST MOD 30 MIN: CPT | Mod: 25 | Performed by: NURSE PRACTITIONER

## 2024-06-07 PROCEDURE — 999N000054 HC STATISTIC EKG NON-CHARGEABLE

## 2024-06-07 PROCEDURE — 80061 LIPID PANEL: CPT | Performed by: PATHOLOGY

## 2024-06-07 PROCEDURE — 250N000011 HC RX IP 250 OP 636: Mod: JZ | Performed by: INTERNAL MEDICINE

## 2024-06-07 PROCEDURE — 250N000011 HC RX IP 250 OP 636: Performed by: INTERNAL MEDICINE

## 2024-06-07 PROCEDURE — 83036 HEMOGLOBIN GLYCOSYLATED A1C: CPT | Performed by: NURSE PRACTITIONER

## 2024-06-07 PROCEDURE — 99152 MOD SED SAME PHYS/QHP 5/>YRS: CPT | Performed by: INTERNAL MEDICINE

## 2024-06-07 PROCEDURE — 71046 X-RAY EXAM CHEST 2 VIEWS: CPT

## 2024-06-07 PROCEDURE — 85025 COMPLETE CBC W/AUTO DIFF WBC: CPT | Performed by: PATHOLOGY

## 2024-06-07 PROCEDURE — 272N000001 HC OR GENERAL SUPPLY STERILE: Performed by: INTERNAL MEDICINE

## 2024-06-07 PROCEDURE — 250N000009 HC RX 250: Performed by: INTERNAL MEDICINE

## 2024-06-07 PROCEDURE — 80053 COMPREHEN METABOLIC PANEL: CPT | Performed by: PATHOLOGY

## 2024-06-07 PROCEDURE — 93005 ELECTROCARDIOGRAM TRACING: CPT

## 2024-06-07 PROCEDURE — C1887 CATHETER, GUIDING: HCPCS | Performed by: INTERNAL MEDICINE

## 2024-06-07 PROCEDURE — 84100 ASSAY OF PHOSPHORUS: CPT | Performed by: PATHOLOGY

## 2024-06-07 PROCEDURE — 86832 HLA CLASS I HIGH DEFIN QUAL: CPT | Performed by: NURSE PRACTITIONER

## 2024-06-07 PROCEDURE — C1751 CATH, INF, PER/CENT/MIDLINE: HCPCS | Performed by: INTERNAL MEDICINE

## 2024-06-07 PROCEDURE — 71046 X-RAY EXAM CHEST 2 VIEWS: CPT | Mod: 26 | Performed by: RADIOLOGY

## 2024-06-07 PROCEDURE — 999N000134 HC STATISTIC PP CARE STAGE 3

## 2024-06-07 RX ORDER — IOPAMIDOL 755 MG/ML
INJECTION, SOLUTION INTRAVASCULAR
Status: DISCONTINUED | OUTPATIENT
Start: 2024-06-07 | End: 2024-06-07 | Stop reason: HOSPADM

## 2024-06-07 RX ORDER — ATROPINE SULFATE 0.1 MG/ML
0.5 INJECTION INTRAVENOUS
Status: DISCONTINUED | OUTPATIENT
Start: 2024-06-07 | End: 2024-06-07 | Stop reason: HOSPADM

## 2024-06-07 RX ORDER — NALOXONE HYDROCHLORIDE 0.4 MG/ML
0.4 INJECTION, SOLUTION INTRAMUSCULAR; INTRAVENOUS; SUBCUTANEOUS
Status: DISCONTINUED | OUTPATIENT
Start: 2024-06-07 | End: 2024-06-07 | Stop reason: HOSPADM

## 2024-06-07 RX ORDER — OXYCODONE HYDROCHLORIDE 5 MG/1
5 TABLET ORAL EVERY 4 HOURS PRN
Status: DISCONTINUED | OUTPATIENT
Start: 2024-06-07 | End: 2024-06-07 | Stop reason: HOSPADM

## 2024-06-07 RX ORDER — NALOXONE HYDROCHLORIDE 0.4 MG/ML
0.2 INJECTION, SOLUTION INTRAMUSCULAR; INTRAVENOUS; SUBCUTANEOUS
Status: DISCONTINUED | OUTPATIENT
Start: 2024-06-07 | End: 2024-06-07 | Stop reason: HOSPADM

## 2024-06-07 RX ORDER — FLUMAZENIL 0.1 MG/ML
0.2 INJECTION, SOLUTION INTRAVENOUS
Status: DISCONTINUED | OUTPATIENT
Start: 2024-06-07 | End: 2024-06-07 | Stop reason: HOSPADM

## 2024-06-07 RX ORDER — LOSARTAN POTASSIUM 25 MG/1
25 TABLET ORAL DAILY
Qty: 90 TABLET | Refills: 3 | Status: SHIPPED | OUTPATIENT
Start: 2024-06-07 | End: 2024-06-14

## 2024-06-07 RX ORDER — FENTANYL CITRATE 50 UG/ML
25 INJECTION, SOLUTION INTRAMUSCULAR; INTRAVENOUS
Status: DISCONTINUED | OUTPATIENT
Start: 2024-06-07 | End: 2024-06-07 | Stop reason: HOSPADM

## 2024-06-07 RX ORDER — HEPARIN SODIUM 1000 [USP'U]/ML
INJECTION, SOLUTION INTRAVENOUS; SUBCUTANEOUS
Status: DISCONTINUED | OUTPATIENT
Start: 2024-06-07 | End: 2024-06-07 | Stop reason: HOSPADM

## 2024-06-07 RX ORDER — POTASSIUM CHLORIDE 750 MG/1
40 TABLET, EXTENDED RELEASE ORAL
Status: DISCONTINUED | OUTPATIENT
Start: 2024-06-07 | End: 2024-06-07 | Stop reason: HOSPADM

## 2024-06-07 RX ORDER — FENTANYL CITRATE 50 UG/ML
INJECTION, SOLUTION INTRAMUSCULAR; INTRAVENOUS
Status: DISCONTINUED | OUTPATIENT
Start: 2024-06-07 | End: 2024-06-07 | Stop reason: HOSPADM

## 2024-06-07 RX ORDER — LIDOCAINE 40 MG/G
CREAM TOPICAL
Status: DISCONTINUED | OUTPATIENT
Start: 2024-06-07 | End: 2024-06-07 | Stop reason: HOSPADM

## 2024-06-07 RX ORDER — SODIUM CHLORIDE 9 MG/ML
INJECTION, SOLUTION INTRAVENOUS CONTINUOUS
Status: DISCONTINUED | OUTPATIENT
Start: 2024-06-07 | End: 2024-06-07 | Stop reason: HOSPADM

## 2024-06-07 RX ORDER — ASPIRIN 81 MG/1
243 TABLET, CHEWABLE ORAL ONCE
Status: COMPLETED | OUTPATIENT
Start: 2024-06-07 | End: 2024-06-07

## 2024-06-07 RX ORDER — ASPIRIN 325 MG
325 TABLET ORAL ONCE
Status: COMPLETED | OUTPATIENT
Start: 2024-06-07 | End: 2024-06-07

## 2024-06-07 RX ORDER — OXYCODONE HYDROCHLORIDE 5 MG/1
10 TABLET ORAL EVERY 4 HOURS PRN
Status: DISCONTINUED | OUTPATIENT
Start: 2024-06-07 | End: 2024-06-07 | Stop reason: HOSPADM

## 2024-06-07 RX ADMIN — HUMAN ALBUMIN MICROSPHERES AND PERFLUTREN 6 ML: 10; .22 INJECTION, SOLUTION INTRAVENOUS at 11:26

## 2024-06-07 RX ADMIN — SODIUM CHLORIDE 500 ML: 9 INJECTION, SOLUTION INTRAVENOUS at 13:19

## 2024-06-07 RX ADMIN — LIDOCAINE: 40 CREAM TOPICAL at 13:18

## 2024-06-07 ASSESSMENT — ACTIVITIES OF DAILY LIVING (ADL)
ADLS_ACUITY_SCORE: 37

## 2024-06-07 ASSESSMENT — PAIN SCALES - GENERAL: PAINLEVEL: NO PAIN (0)

## 2024-06-07 NOTE — NURSING NOTE
Chief Complaint   Patient presents with    Follow Up     Chronic systolic heart failure     Vitals were taken and medications reconciled.    Sam Gauthier, EMT  8:49 AM

## 2024-06-07 NOTE — LETTER
6/7/2024      RE: Mariusz Sharp  [address] [facility] [address]       Dear Colleague,    Thank you for the opportunity to participate in the care of your patient, Mariusz Sharp, at the [facility] [place] at [facility]. Please see a copy of my visit note below.    [facility]  ADULT HEART TRANSPLANT CLINIC    HPI:   Mr. Sharp is a 63 year old male who presents to clinic today for routine heart transplant follow-up. Patient has history of CAD (STEMI with ALYSSA to LAD 6/27/18), ICM (LVEF 20-25%, s/p HM3 LVAD 12/31/18) s/p OHT 5/18/20, monomorphic VT (s/p ICD with shocks), LV thrombus, CKD, elevated PSA, pAF, HTN, HL, and DMII. Post-transplant course was c/b NSVT (with no hemodynamic compromise), leukocytosis with C Acnes growing from his former LVAD site (thought to be a contaminant, but treated with IV vanco --> po doxy through 6/1/20, 14d course total), and in the setting of donor blood growing S anginosus and Veillonella (also thought to be a contaminant, but treated with Vanco/zosyn --> Vanco/Flagyl for a total of 7 days).  He also had hyperkalemia, which improved following kayexalate and stopping bactrim.  He was readmitted 8/31-9/24/20 with fever, cough, diarrhea, and syncope.  He was found to have a post-obstructive vs aspiration pneumonia, RUL/suprahilar mass, and narrowing of multiple RUL bronchi.  He was initially treated as a fungal infection as his fungitelle was +, but he did not clinically improve, nor did the size of the mass change.  Ultimately, tissue culture from the RUL mass showed abiotrophia defectiva (oral microbe), which was thought to be a contaminant, but did respond to antibiotics. MMF was decreased to 500mg twice daily.    Since last visit he is feeling ok. He is physically active, denies any exertional symptoms of chest pain, shortness of breath, lightheadedness. He notes a mild cough when he  "\"walks hard\" but this is unchanged. No fevers, chills, cough. He has had a few episodes of diarrhea in the last year that resolved with a dose of imodium. Rarely uses torsemide, still taking kcl. Denies any recent LE edema orthopnea or PND. BP at home are 130s/80s. DM is managed by local providers. No nightsweats, HA, vomiting.     Assessment/Plan:  Mr. Sharp is a 63 year old male who is s/p orthotopic heart transplant on 5/18/20 who presents to clinic for routine follow-up. From a cardiac standpoint he is doing well. He is physically active, no rejection, no DSAs. His cor angio 6/7 showed normal coronaries and normal hemodynamics.     # Status post OHT on 5/18/20, history of ICM  # Immunosuppressed due to medications  Post-transplant course c/b NSVT (no hemodynamic compromise), C Acnes growing from former LVAD site (thought to be a contaminant, but treated with IV vanco --> po doxy through 6/1/20, 14d course total), and in the setting of donor blood growing S anginosus and Veillonella (also thought to be a contaminant, but treated with Vanco/zosyn --> Vanco/Flagyl for a total of 7 days), hyperkalemia, improved stopping bactrim.     * Rejection history: none.   * Recent immunosuppression changes: none  * Last biopsy: 5/2023 NER  * DSAs: none  * Last Immuknow: 542 2023  * Intolerance to medications: none    Immunosuppression:  - everolimus trough goal 6-8  - MMF 1250 mg bid    PPx:  - CAV: rosuvastatin 10mg daily and ASA 81 mg daily  - GI:  Pantoprazole 40mg daily  - Osteoporosis:  Calcium/vitamin d supplements     Graft function:  - euvolemic on prn torsemide, STOP scheduled kcl   - BP control as below    Serostatus: CMV D+/R-  EBV D-/R+ Toxo D-/R-    # Health Care Maintenance  - PCP: Dr Fry  Derm: Yearly in [place]  Dental: DUE  Colonoscopy: Due 2025  Breast/Prostate: PSA pending  Eye: UTD  Flu/Pneumonia: Pneumonia- done, Flu and Covid due this Fall, Needs Shingrix    HTN  Start losartan 25 mg daily, will attempt " to wean hydralazine over the comign week. Continue amlodipine 10 mg daily. BMP in 7-10d.    Post-obstructive versus aspiration pneumonia  RUL/suprahilar mass   +abiotrophia defectiva  8/31/20-9/24/20 admitted for post-obstructive vs aspiration pneumonia, RUL/suprahilar mass, and narrowing of multiple RUL bronchi.  initially treated as a fungal infection as his fungitell was +, but he did not clinically improve, nor did the size of the mass change.  Ultimately, tissue culture from the RUL mass showed abiotrophia defectiva (oral microbe), which was thought to be a contaminant, but did respond to antibiotics. MMF was decreased to 500mg twice daily, as ImmuKnow was noted to be low.  He completed 4 weeks of antibiotic therapy 10/11/20, and follow up chest CTs have shown improvement in the RUL infiltrate.     DMII  Managed locally. On sglt2i and ozempic.      Elevated PSA  BPH  Possible chronic prostatits  Prostate MRI 5/2020 showed signs of BPH, he follows with urology.    Prior corneal hemorrhage  Follows with optho    Dinora Hraper DNP, NP-C  Advanced Heart Failure/Cardiac Transplant Nurse Practitioner  6/7/2024        40 minutes spent on the date of the encounter doing chart review, history and exam, documentation and further activities per the note    PAST MEDICAL HISTORY:  Past Medical History:   Diagnosis Date     Acute kidney injury (H24)      CKD (chronic kidney disease)      Coronary artery disease      Diabetes mellitus (H)      HTN (hypertension)      Hyperlipidemia      ICD (implantable cardioverter-defibrillator), single chamber- NOT dependent 7/17/2018     Ischemic cardiomyopathy      LV (left ventricular) mural thrombus      Paroxysmal atrial flutter (H)      RVF (right ventricular failure) (H)      Systolic heart failure (H)      Ventricular tachycardia (H)        FAMILY HISTORY:  No family history on file.    SOCIAL HISTORY:  Social History     Socioeconomic History     Marital status: Single     Spouse  name: None     Number of children: None     Years of education: None     Highest education level: None   Tobacco Use     Smoking status: Never     Smokeless tobacco: Never   Vaping Use     Vaping status: Never Used   Substance and Sexual Activity     Alcohol use: No     Drug use: No   Other Topics Concern     Parent/sibling w/ CABG, MI or angioplasty before 65F 55M? No       CURRENT MEDICATIONS:  Current Outpatient Medications   Medication Sig Dispense Refill     ACCU-CHEK [place] PLUS test strip Check BG 3 times daily at meals and at bedtime. 400 strip 3     acetaminophen (TYLENOL) 325 MG tablet Take 2 tablets (650 mg) by mouth every 4 hours as needed for other (multimodal surgical pain management along with NSAIDS and opioid medication as indicated based on pain control and physical function.)       amLODIPine (NORVASC) 10 MG tablet Take 1 tablet (10 mg) by mouth daily 90 tablet 3     aspirin (ASA) 81 MG EC tablet Take 1 tablet (81 mg) by mouth daily 30 tablet 11     BD SILVANA U/F 32G X 4 MM insulin pen needle Use 6 times daily. 600 each 3     calcium carbonate 600 mg-vitamin D 400 units (CALTRATE) 600-400 MG-UNIT per tablet Take 1 tablet by mouth 2 times daily (with meals)       Continuous Blood Gluc Sensor (FREESTYLE DARRELL 2 SENSOR) MISC 1 each every 14 days Use 1 sensor every 14 days. Use to read blood sugars per 's instructions. 2 each 5     dapagliflozin (FARXIGA) 10 MG TABS tablet Take 1 tablet (10 mg) by mouth daily 90 tablet 3     everolimus (ZORTRESS) 0.5 MG tablet Take 3 tablets (1.5 mg) by mouth every morning AND 2 tablets (1 mg) every evening. 450 tablet 3     HUMALOG KWIKPEN 100 UNIT/ML soln INJECT 1 UNIT PER EVERY 8 GRAMS OF CARBS WITH MEALS AND SNACKS, PLUS CORRECTION, APPROX 70 UNITS IN 24 HOURS 75 mL 3     hydrALAZINE (APRESOLINE) 50 MG tablet Take 2.5 tablets (125 mg) by mouth 3 times daily 675 tablet 3     insulin glargine (LANTUS PEN) 100 UNIT/ML pen Inject 34 units subcutaneous at hs.  15 mL 3     loperamide (IMODIUM) 2 MG capsule Take 1 capsule (2 mg) by mouth 4 times daily as needed for diarrhea 60 capsule 0     magnesium oxide (MAG-OX) 400 (241.3 Mg) MG tablet Take 1 tablet (400 mg) by mouth daily 90 tablet 3     multivitamin w/minerals (THERA-VIT-M) tablet Take 1 tablet by mouth daily 90 tablet 3     mycophenolate (GENERIC EQUIVALENT) 250 MG capsule Take 5 capsules (1,250 mg) by mouth 2 times daily 300 capsule 11     OZEMPIC, 1 MG/DOSE, 4 MG/3ML pen Inject 1 mg subcutaneous once a week once you have taken 0.5 mg subcutaneous weekly x 1 month. 15 mL 1     pantoprazole (PROTONIX) 40 MG EC tablet Take 1 tablet (40 mg) by mouth every morning (before breakfast) 90 tablet 3     potassium chloride ER (KLOR-CON M) 20 MEQ CR tablet Take 1 tablet (20 mEq) by mouth daily 90 tablet 3     rosuvastatin (CRESTOR) 20 MG tablet TAKE 1 TABLET(20 MG) BY MOUTH DAILY 90 tablet 3     torsemide (DEMADEX) 20 MG tablet Take 1 tablet (20 mg) by mouth daily 90 tablet 3     No current facility-administered medications for this visit.       ROS:  See HPI    EXAM:  BP (!) 138/93 (BP Location: Right arm, Patient Position: Chair, Cuff Size: Adult Large)   Pulse 106   Wt 89 kg (196 lb 4.8 oz)   SpO2 98%   BMI 33.80 kg/m      GENERAL: Appears comfortable, in no acute distress.   HEENT: Eye symmetrical, no discharge or icterus bilaterally. Mucous membranes moist and without lesions. No thrush.   CV: Tachycardic but regular, +S1S2, no murmur, rub, or gallop. JVP not visible.   RESPIRATORY: Respirations regular, even, and unlabored. Lungs CTA throughout.   GI: Soft, obese and non distended with normoactive bowel sounds present in all quadrants. No tenderness, rebound, guarding. No hepatomegaly.   EXTREMITIES: No peripheral edema. 2+ bilateral pedal pulses.   NEUROLOGIC: Alert and oriented x 3. No focal deficits.   MUSCULOSKELETAL: No joint swelling or tenderness.   SKIN: No jaundice. No rashes or lesions.     Labs - reviewed  with patient in clinic today:  CBC RESULTS:  Lab Results   Component Value Date    WBC 5.8 06/07/2024    WBC 5.3 06/23/2021    RBC 5.69 06/07/2024    RBC 4.22 (L) 06/23/2021    HGB 15.7 06/07/2024    HGB 10.1 (L) 06/23/2021    HCT 48.1 06/07/2024    HCT 34.6 (L) 06/23/2021    MCV 85 06/07/2024    MCV 82 06/23/2021    MCH 27.6 06/07/2024    MCH 26.3 (L) 06/23/2021    MCHC 32.6 06/07/2024    MCHC 32.1 06/23/2021    RDW 13.8 06/07/2024    RDW 12.7 06/23/2021     06/07/2024     06/23/2021       CMP RESULTS:  Lab Results   Component Value Date     05/11/2023     06/23/2021    POTASSIUM 3.8 05/11/2023    POTASSIUM 3.5 05/18/2022    POTASSIUM 3.6 06/23/2021    CHLORIDE 104 06/01/2023    CHLORIDE 105 06/23/2021    CO2 22 05/11/2023    CO2 25 05/18/2022    CO2 24 06/23/2021    ANIONGAP 14 05/11/2023    ANIONGAP 10 05/18/2022    ANIONGAP 7 06/23/2021     (H) 05/11/2023     (H) 05/11/2023     (H) 05/18/2022     (H) 06/23/2021    BUN 34.3 (H) 05/11/2023    BUN 41 (H) 05/18/2022    BUN 35 (H) 06/23/2021    CR 1.75 (H) 05/11/2023    CR 1.93 (H) 06/23/2021    GFRESTIMATED 44 (L) 05/11/2023    GFRESTIMATED 37 (L) 06/23/2021    GFRESTBLACK 43 (L) 06/23/2021    ARLENE 9.6 05/11/2023    ARLENE 9.3 06/23/2021    BILITOTAL 0.7 05/11/2023    BILITOTAL 0.8 06/23/2021    ALBUMIN 4.6 05/11/2023    ALBUMIN 3.8 05/18/2022    ALBUMIN 3.6 06/23/2021    ALKPHOS 67 05/11/2023    ALKPHOS 98 06/23/2021    ALT 19 05/11/2023    ALT 26 06/23/2021    AST 23 05/11/2023    AST 22 06/23/2021        INR RESULTS:  Lab Results   Component Value Date    INR 1.06 08/26/2020       LIPID RESULTS:  Lab Results   Component Value Date    CHOL 153 05/11/2023    CHOL 168 06/23/2021    HDL 36 (L) 05/11/2023    HDL 49 06/23/2021    LDL 61 05/11/2023     (H) 10/26/2021    LDL 75 06/23/2021    TRIG 280 (H) 05/11/2023    TRIG 220 (H) 06/23/2021       IMMUNOSUPPRESSANT LEVELS:  Lab Results   Component Value Date     "TACROL <3.0 (L) 02/23/2022    TACROL 3.5 (L) 06/23/2021    DOSTAC  02/23/2022      Comment:      Last dose information not provided.    DOSTAC Not Provided 06/23/2021       No components found for: \"CK\"  Lab Results   Component Value Date    MAG 2.0 05/11/2023    MAG 1.9 06/23/2021     Lab Results   Component Value Date    A1C 7.4 (H) 05/11/2023    A1C 10.3 (H) 06/23/2021     Lab Results   Component Value Date    PHOS 3.5 05/11/2023    PHOS 3.4 06/23/2021     Lab Results   Component Value Date    NTBNP 404 (H) 06/26/2019     Lab Results   Component Value Date    SAITESTMET SA EDTA FCS 05/11/2023    SAITESTMET SA FCS 06/23/2021    SAICELL Class I 05/11/2023    SAICELL Class I 06/23/2021    [id number] None 05/11/2023    [id number] Cw:15 06/23/2021    [id number] Cw:15 18 05/11/2023    [id number] Cw:18 06/23/2021    SAIREPCOM  06/23/2021      Test performed by modified procedure. Serum heat inactivated and tested   by a modified ([place]) protocol including fetal calf serum addition.   High-risk, mfi >3,000. Mod-risk, mfi 500-3,000.       Lab Results   Component Value Date    SAIITESTME SA EDTA FCS 05/11/2023    SAIITESTME SA FCS 06/23/2021    SAIICELL Class II 05/11/2023    SAIICELL Class II 06/23/2021    [id number] None 05/11/2023    [id number] None 06/23/2021    [id number] None 05/11/2023    [id number] None 06/23/2021    SAIIREPCOM  06/23/2021      Test performed by modified procedure. Serum heat inactivated and tested   by a modified ([place]) protocol including fetal calf serum addition.   High-risk, mfi >3,000. Mod-risk, mfi 500-3,000.       Lab Results   Component Value Date    CSPEC Plasma 06/23/2021       Diagnostic Studies:  Cor angio and RHC 6/7/24   Right sided filling pressures are normal.     Left sided filling pressures are normal.     Normal PA pressures.     Left ventricular filling pressures are normal.     Normal cardiac output level.     Hemodynamic data has been modified in Epic per physician review.   "   Angiographically normal coronary arteries  Normal biventricular filling pressures with preserved cardiac output and pulmonary pressures.        TTE 6/7/24  Left ventricular size is normal. Left ventricular function is normal.The  ejection fraction is 60-65%.  Global right ventricular function is mildly reduced.  No significant valvular abnormalities present.  The inferior vena cava was normal in size with preserved respiratory  variability.  No pericardial effusion is present.     This study was compared with the study from 5/11/2023. No significant changes  noted.  ____              CC  SELF, REFERRED              Please do not hesitate to contact me if you have any questions/concerns.     Sincerely,     CARMEN Ibarra CNP

## 2024-06-07 NOTE — PROGRESS NOTES
"[facility]  ADULT HEART TRANSPLANT CLINIC    HPI:   Mr. Sharp is a 63 year old male who presents to clinic today for routine heart transplant follow-up. Patient has history of CAD (STEMI with ALYSSA to LAD 6/27/18), ICM (LVEF 20-25%, s/p HM3 LVAD 12/31/18) s/p OHT 5/18/20, monomorphic VT (s/p ICD with shocks), LV thrombus, CKD, elevated PSA, pAF, HTN, HL, and DMII. Post-transplant course was c/b NSVT (with no hemodynamic compromise), leukocytosis with C Acnes growing from his former LVAD site (thought to be a contaminant, but treated with IV vanco --> po doxy through 6/1/20, 14d course total), and in the setting of donor blood growing S anginosus and Veillonella (also thought to be a contaminant, but treated with Vanco/zosyn --> Vanco/Flagyl for a total of 7 days).  He also had hyperkalemia, which improved following kayexalate and stopping bactrim.  He was readmitted 8/31-9/24/20 with fever, cough, diarrhea, and syncope.  He was found to have a post-obstructive vs aspiration pneumonia, RUL/suprahilar mass, and narrowing of multiple RUL bronchi.  He was initially treated as a fungal infection as his fungitelle was +, but he did not clinically improve, nor did the size of the mass change.  Ultimately, tissue culture from the RUL mass showed abiotrophia defectiva (oral microbe), which was thought to be a contaminant, but did respond to antibiotics. MMF was decreased to 500mg twice daily.    Since last visit he is feeling ok. He is physically active, denies any exertional symptoms of chest pain, shortness of breath, lightheadedness. He notes a mild cough when he \"walks hard\" but this is unchanged. No fevers, chills, cough. He has had a few episodes of diarrhea in the last year that resolved with a dose of imodium. Rarely uses torsemide, still taking kcl. Denies any recent LE edema orthopnea or PND. BP at home are 130s/80s. DM is managed by local providers. No nightsweats, HA, vomiting. "     Assessment/Plan:  Mr. Sharp is a 63 year old male who is s/p orthotopic heart transplant on 5/18/20 who presents to clinic for routine follow-up. From a cardiac standpoint he is doing well. He is physically active, no rejection, no DSAs. His cor angio 6/7 showed normal coronaries and normal hemodynamics.     # Status post OHT on 5/18/20, history of ICM  # Immunosuppressed due to medications  Post-transplant course c/b NSVT (no hemodynamic compromise), C Acnes growing from former LVAD site (thought to be a contaminant, but treated with IV vanco --> po doxy through 6/1/20, 14d course total), and in the setting of donor blood growing S anginosus and Veillonella (also thought to be a contaminant, but treated with Vanco/zosyn --> Vanco/Flagyl for a total of 7 days), hyperkalemia, improved stopping bactrim.     * Rejection history: none.   * Recent immunosuppression changes: none  * Last biopsy: 5/2023 [facility]  * DSAs: none  * Last Immuknow: 542 2023  * Intolerance to medications: none    Immunosuppression:  - everolimus trough goal 6-8  - MMF 1250 mg bid    PPx:  - CAV: rosuvastatin 10mg daily and ASA 81 mg daily  - GI:  Pantoprazole 40mg daily  - Osteoporosis:  Calcium/vitamin d supplements     Graft function:  - euvolemic on prn torsemide, STOP scheduled kcl   - BP control as below    Serostatus: CMV D+/R-  EBV D-/R+ Toxo D-/R-    # Health Care Maintenance  - PCP: Dr Fry  Derm: Yearly in [place]  Dental: DUE  Colonoscopy: Due 2025  Breast/Prostate: PSA pending  Eye: UTD  Flu/Pneumonia: Pneumonia- done, Flu and Covid due this Fall, Needs Shingrix    HTN  Start losartan 25 mg daily, will attempt to wean hydralazine over the comign week. Continue amlodipine 10 mg daily. BMP in 7-10d.    Post-obstructive versus aspiration pneumonia  RUL/suprahilar mass   +abiotrophia defectiva  8/31/20-9/24/20 admitted for post-obstructive vs aspiration pneumonia, RUL/suprahilar mass, and narrowing of multiple RUL bronchi.  initially  treated as a fungal infection as his fungitell was +, but he did not clinically improve, nor did the size of the mass change.  Ultimately, tissue culture from the RUL mass showed abiotrophia defectiva (oral microbe), which was thought to be a contaminant, but did respond to antibiotics. MMF was decreased to 500mg twice daily, as ImmuKnow was noted to be low.  He completed 4 weeks of antibiotic therapy 10/11/20, and follow up chest CTs have shown improvement in the RUL infiltrate.     DMII  Managed locally. On sglt2i and ozempic.      Elevated PSA  BPH  Possible chronic prostatits  Prostate MRI 5/2020 showed signs of BPH, he follows with urology.    Prior corneal hemorrhage  Follows with cristela Harper DNP, NP-C  Advanced Heart Failure/Cardiac Transplant Nurse Practitioner  6/7/2024        40 minutes spent on the date of the encounter doing chart review, history and exam, documentation and further activities per the note    PAST MEDICAL HISTORY:  Past Medical History:   Diagnosis Date    Acute kidney injury (H24)     CKD (chronic kidney disease)     Coronary artery disease     Diabetes mellitus (H)     HTN (hypertension)     Hyperlipidemia     ICD (implantable cardioverter-defibrillator), single chamber- NOT dependent 7/17/2018    Ischemic cardiomyopathy     LV (left ventricular) mural thrombus     Paroxysmal atrial flutter (H)     RVF (right ventricular failure) (H)     Systolic heart failure (H)     Ventricular tachycardia (H)        FAMILY HISTORY:  No family history on file.    SOCIAL HISTORY:  Social History     Socioeconomic History    Marital status: Single     Spouse name: None    Number of children: None    Years of education: None    Highest education level: None   Tobacco Use    Smoking status: Never    Smokeless tobacco: Never   Vaping Use    Vaping status: Never Used   Substance and Sexual Activity    Alcohol use: No    Drug use: No   Other Topics Concern    Parent/sibling w/ CABG, MI or  angioplasty before 65F 55M? No       CURRENT MEDICATIONS:  Current Outpatient Medications   Medication Sig Dispense Refill    ACCU-CHEK [place]TE PLUS test strip Check BG 3 times daily at meals and at bedtime. 400 strip 3    acetaminophen (TYLENOL) 325 MG tablet Take 2 tablets (650 mg) by mouth every 4 hours as needed for other (multimodal surgical pain management along with NSAIDS and opioid medication as indicated based on pain control and physical function.)      amLODIPine (NORVASC) 10 MG tablet Take 1 tablet (10 mg) by mouth daily 90 tablet 3    aspirin (ASA) 81 MG EC tablet Take 1 tablet (81 mg) by mouth daily 30 tablet 11    BD SILVANA U/F 32G X 4 MM insulin pen needle Use 6 times daily. 600 each 3    calcium carbonate 600 mg-vitamin D 400 units (CALTRATE) 600-400 MG-UNIT per tablet Take 1 tablet by mouth 2 times daily (with meals)      Continuous Blood Gluc Sensor (FREESTYLE DARRELL 2 SENSOR) MISC 1 each every 14 days Use 1 sensor every 14 days. Use to read blood sugars per 's instructions. 2 each 5    dapagliflozin (FARXIGA) 10 MG TABS tablet Take 1 tablet (10 mg) by mouth daily 90 tablet 3    everolimus (ZORTRESS) 0.5 MG tablet Take 3 tablets (1.5 mg) by mouth every morning AND 2 tablets (1 mg) every evening. 450 tablet 3    HUMALOG KWIKPEN 100 UNIT/ML soln INJECT 1 UNIT PER EVERY 8 GRAMS OF CARBS WITH MEALS AND SNACKS, PLUS CORRECTION, APPROX 70 UNITS IN 24 HOURS 75 mL 3    hydrALAZINE (APRESOLINE) 50 MG tablet Take 2.5 tablets (125 mg) by mouth 3 times daily 675 tablet 3    insulin glargine (LANTUS PEN) 100 UNIT/ML pen Inject 34 units subcutaneous at hs. 15 mL 3    loperamide (IMODIUM) 2 MG capsule Take 1 capsule (2 mg) by mouth 4 times daily as needed for diarrhea 60 capsule 0    magnesium oxide (MAG-OX) 400 (241.3 Mg) MG tablet Take 1 tablet (400 mg) by mouth daily 90 tablet 3    multivitamin w/minerals (THERA-VIT-M) tablet Take 1 tablet by mouth daily 90 tablet 3    mycophenolate (GENERIC  EQUIVALENT) 250 MG capsule Take 5 capsules (1,250 mg) by mouth 2 times daily 300 capsule 11    OZEMPIC, 1 MG/DOSE, 4 MG/3ML pen Inject 1 mg subcutaneous once a week once you have taken 0.5 mg subcutaneous weekly x 1 month. 15 mL 1    pantoprazole (PROTONIX) 40 MG EC tablet Take 1 tablet (40 mg) by mouth every morning (before breakfast) 90 tablet 3    potassium chloride ER (KLOR-CON M) 20 MEQ CR tablet Take 1 tablet (20 mEq) by mouth daily 90 tablet 3    rosuvastatin (CRESTOR) 20 MG tablet TAKE 1 TABLET(20 MG) BY MOUTH DAILY 90 tablet 3    torsemide (DEMADEX) 20 MG tablet Take 1 tablet (20 mg) by mouth daily 90 tablet 3     No current facility-administered medications for this visit.       ROS:  See HPI    EXAM:  BP (!) 138/93 (BP Location: Right arm, Patient Position: Chair, Cuff Size: Adult Large)   Pulse 106   Wt 89 kg (196 lb 4.8 oz)   SpO2 98%   BMI 33.80 kg/m      GENERAL: Appears comfortable, in no acute distress.   HEENT: Eye symmetrical, no discharge or icterus bilaterally. Mucous membranes moist and without lesions. No thrush.   CV: Tachycardic but regular, +S1S2, no murmur, rub, or gallop. JVP not visible.   RESPIRATORY: Respirations regular, even, and unlabored. Lungs CTA throughout.   GI: Soft, obese and non distended with normoactive bowel sounds present in all quadrants. No tenderness, rebound, guarding. No hepatomegaly.   EXTREMITIES: No peripheral edema. 2+ bilateral pedal pulses.   NEUROLOGIC: Alert and oriented x 3. No focal deficits.   MUSCULOSKELETAL: No joint swelling or tenderness.   SKIN: No jaundice. No rashes or lesions.     Labs - reviewed with patient in clinic today:  CBC RESULTS:  Lab Results   Component Value Date    WBC 5.8 06/07/2024    WBC 5.3 06/23/2021    RBC 5.69 06/07/2024    RBC 4.22 (L) 06/23/2021    HGB 15.7 06/07/2024    HGB 10.1 (L) 06/23/2021    HCT 48.1 06/07/2024    HCT 34.6 (L) 06/23/2021    MCV 85 06/07/2024    MCV 82 06/23/2021    MCH 27.6 06/07/2024    [facility] 26.3  "(L) 06/23/2021    MCHC 32.6 06/07/2024    MCHC 32.1 06/23/2021    RDW 13.8 06/07/2024    RDW 12.7 06/23/2021     06/07/2024     06/23/2021       CMP RESULTS:  Lab Results   Component Value Date     05/11/2023     06/23/2021    POTASSIUM 3.8 05/11/2023    POTASSIUM 3.5 05/18/2022    POTASSIUM 3.6 06/23/2021    CHLORIDE 104 06/01/2023    CHLORIDE 105 06/23/2021    CO2 22 05/11/2023    CO2 25 05/18/2022    CO2 24 06/23/2021    ANIONGAP 14 05/11/2023    ANIONGAP 10 05/18/2022    ANIONGAP 7 06/23/2021     (H) 05/11/2023     (H) 05/11/2023     (H) 05/18/2022     (H) 06/23/2021    BUN 34.3 (H) 05/11/2023    BUN 41 (H) 05/18/2022    BUN 35 (H) 06/23/2021    CR 1.75 (H) 05/11/2023    CR 1.93 (H) 06/23/2021    GFRESTIMATED 44 (L) 05/11/2023    GFRESTIMATED 37 (L) 06/23/2021    GFRESTBLACK 43 (L) 06/23/2021    ARLENE 9.6 05/11/2023    ARLENE 9.3 06/23/2021    BILITOTAL 0.7 05/11/2023    BILITOTAL 0.8 06/23/2021    ALBUMIN 4.6 05/11/2023    ALBUMIN 3.8 05/18/2022    ALBUMIN 3.6 06/23/2021    ALKPHOS 67 05/11/2023    ALKPHOS 98 06/23/2021    ALT 19 05/11/2023    ALT 26 06/23/2021    AST 23 05/11/2023    AST 22 06/23/2021        INR RESULTS:  Lab Results   Component Value Date    INR 1.06 08/26/2020       LIPID RESULTS:  Lab Results   Component Value Date    CHOL 153 05/11/2023    CHOL 168 06/23/2021    HDL 36 (L) 05/11/2023    HDL 49 06/23/2021    LDL 61 05/11/2023     (H) 10/26/2021    LDL 75 06/23/2021    TRIG 280 (H) 05/11/2023    TRIG 220 (H) 06/23/2021       IMMUNOSUPPRESSANT LEVELS:  Lab Results   Component Value Date    TACROL <3.0 (L) 02/23/2022    TACROL 3.5 (L) 06/23/2021    DOSTAC  02/23/2022      Comment:      Last dose information not provided.    DOSTAC Not Provided 06/23/2021       No components found for: \"CK\"  Lab Results   Component Value Date    MAG 2.0 05/11/2023    MAG 1.9 06/23/2021     Lab Results   Component Value Date    A1C 7.4 (H) 05/11/2023    A1C " 10.3 (H) 06/23/2021     Lab Results   Component Value Date    PHOS 3.5 05/11/2023    PHOS 3.4 06/23/2021     Lab Results   Component Value Date    NTBNP 404 (H) 06/26/2019     Lab Results   Component Value Date    SAITESTMET SA EDTA FCS 05/11/2023    SAITESTMET SA FCS 06/23/2021    SAICELL Class I 05/11/2023    SAICELL Class I 06/23/2021    PZ4NXNUMQ None 05/11/2023    AE6ISMBQS Cw:15 06/23/2021    DL3HCZFEMM Cw:15 18 05/11/2023    JA2HGEFZEN Cw:18 06/23/2021    SAIREPCOM  06/23/2021      Test performed by modified procedure. Serum heat inactivated and tested   by a modified (Krebs) protocol including fetal calf serum addition.   High-risk, mfi >3,000. Mod-risk, mfi 500-3,000.       Lab Results   Component Value Date    SAIITESTME SA EDTA FCS 05/11/2023    SAIITESTME SA FCS 06/23/2021    SAIICELL Class II 05/11/2023    SAIICELL Class II 06/23/2021    OA5ZXNFCW None 05/11/2023    FQ0XDWYXR None 06/23/2021    ZA3TZSDYSY None 05/11/2023    NP8XTIBRSI None 06/23/2021    SAIIREPCOM  06/23/2021      Test performed by modified procedure. Serum heat inactivated and tested   by a modified (Krebs) protocol including fetal calf serum addition.   High-risk, mfi >3,000. Mod-risk, mfi 500-3,000.       Lab Results   Component Value Date    CSPEC Plasma 06/23/2021       Diagnostic Studies:  Cor angio and RHC 6/7/24   Right sided filling pressures are normal.    Left sided filling pressures are normal.    Normal PA pressures.    Left ventricular filling pressures are normal.    Normal cardiac output level.    Hemodynamic data has been modified in Epic per physician review.     Angiographically normal coronary arteries  Normal biventricular filling pressures with preserved cardiac output and pulmonary pressures.        TTE 6/7/24  Left ventricular size is normal. Left ventricular function is normal.The  ejection fraction is 60-65%.  Global right ventricular function is mildly reduced.  No significant valvular abnormalities  present.  The inferior vena cava was normal in size with preserved respiratory  variability.  No pericardial effusion is present.     This study was compared with the study from 5/11/2023. No significant changes  noted.  ____              CC  SELF, REFERRED

## 2024-06-07 NOTE — PROGRESS NOTES
D/I/A: Pt roomed on 2a in RM 1.  Arrived via litter and accompanied by CCL RN, Bairon ALLEN. Alert and oriented.  Rhythm upon arrival Sinus Tachycardia on monitor.  Denies pain or sob.  Reviewed activity restrictions and when to notify RN, ie-changes to breathing or increased chest pressure or chest pain.  CCL access:  Right Radial. TR band in place, deflation begins at 1535. No signs of bleeding or Hematoma. Capillary refill <3 seconds. Slight numbness in the ring finger, coban loosened. RIJ is clean and dry, primapore in place, no signs of bleeding or hematoma. Tolerating PO intake. .  P: Continue to monitor status.  Discharge to home once meeting criteria.

## 2024-06-07 NOTE — PRE-PROCEDURE
GENERAL PRE-PROCEDURE:   Procedure:  Coronary angiogram, RHC    Written consent obtained?: Yes    Risks and benefits: Risks, benefits and alternatives were discussed    Consent given by:  Patient  Patient states understanding of procedure being performed: Yes    Patient's understanding of procedure matches consent: Yes    Procedure consent matches procedure scheduled: Yes    Expected level of sedation:  Moderate  Appropriately NPO:  Yes  ASA Class:  3  Mallampati  :  Grade 1- soft palate, uvula, tonsillar pillars, and posterior pharyngeal wall visible  Lungs:  Lungs clear with good breath sounds bilaterally  Heart:  Normal heart sounds and rate  History & Physical reviewed:  History and physical reviewed and no updates needed  Statement of review:  I have reviewed the lab findings, diagnostic data, medications, and the plan for sedation

## 2024-06-07 NOTE — DISCHARGE INSTRUCTIONS
If you have any questions or concerns regarding your procedure site please call [phone] at anytime and ask for Cardiology Fellow on call.  They are available 24 hours a day.  You may also contact the Cardiology Clinic after hours number at [phone].    [facility] [place]:  [phone] (7 days a week)    Going Home after a Coronary Angiogram  ______________________________________________  After you go home:  Have an adult stay with you for 24 hours.  Drink plenty of fluids. You may eat your normal diet.  For 24 hours:  Relax and take it easy.  Do NOT smoke.  Do NOT make any important or legal decisions.  Do NOT drive or operate machines at home or at work.  Do NOT drink alcohol.  Remove the dressings tomorrow. If there is minor oozing, apply another Band-aid and remove it after 12 hours.  You may shower tomorrow. Do NOT take a bath, or use a hot tub or pool for at least 3 days.    Care of groin site  It is normal to have a small bruise or lump at the site.  Do not scrub the site.  For the first 2 days: Do not stoop or squat. When you cough, sneeze or move your bowels, hold your hand over the puncture site and press gently.  Do not use lotion or powder near the puncture site for 3 days.  Do not lift more than 10 pounds for at least 3 to 5 days.  For 2 days, do NOT have sex or do any heavy exercise.    If you start bleeding from the site in your groin, lie down flat and press firmly on the site for at least 5 minutes. Call your doctor as soon as you can.    Care of wrist or arm site  It is normal to have soreness at the puncture site and mild tingling in your hand for up to 3 days.  For 2 days, do not use your hand or arm to support your weight (such as rising from a chair) or bend your wrist (such as lifting a garage door).  For 2 days, do not lift more than 5 pounds or exercise your arm (tennis, golf or bowling).    If you start bleeding from the site in your arm:  Sit  down and press firmly on the site with your fingers for at least 5 minutes. Call your doctor as soon as you can.  If the bleeding stops, sit still and keep your wrist straight for 2 hours.    Call 911 right away if you have bleeding that is heavy or does not stop.    Medicines  If you have started taking Plavix or Effient, do not stop taking it until you talk to your heart doctor (cardiologist).  If you are on metformin (Glucophage), do not restart it until you have blood tests (within 2 to 3 days after discharge). When your doctor tells you it is safe, you may restart the metformin.  If you have stopped any other medicines, check with your primary cardiologist or nurse coordinator.    Call your doctor if:  You have a large or growing hard lump around the site.  The site is red, swollen, hot or tender.  Blood or fluid is draining from the site.  You have chills or a fever greater than 101 F (38 C).  Your leg or arm feels numb or cool.  You have hives, a rash or unusual itching.                  Discharge Instructions   Post Right Heart Cath and/or Heart Biopsy  Do Not scrub the procedure site vigorously  No lotion or powder to the puncture site for 3 days    CALL YOUR PRIMARY PHYSICIAN IF: You may resume all normal activity.  Monitor neck site for bleeding, swelling, or voice changes. If you notice bleeding or swelling immediately apply pressure to the site for at least 5 minutes and call number below to speak with Cardiology Fellow.  If you experience any changes in your breathing you should call your doctor immediately or come to the closest Emergency Department.  Do not drive yourself.    Follow Up: Per your primary cardiology team                                                     Telephone Numbers [phone] Monday-Friday 8:00 am to 4:30 pm    [phone] After 4:30 pm Monday-Friday, Weekends & Holidays  Ask for [facility] Cardiologist fellow on call. Someone is on call 24 hours/day   [facility] toll free  number [phone] Monday-Friday 8:00 am to 4:30 pm   [facility] Emergency Dept [phone]

## 2024-06-07 NOTE — PATIENT INSTRUCTIONS
Patient Instructions  1. Make an appointment to see Dermatology for a yearly skin check.  Can see them in [place].  2. Start taking 25mg of Losartan.  Keep checking your BP daily- and we will check in next week.  3. OK to stop the Potassium.    Next transplant clinic appointment:  in year for 5th annual  Next lab draw:   Coordinator will call with all pending results.     Please call transplant coordinator with any questions:    Angelika Muller RN BSN   Post Heart Transplant Nurse Coordinator  [facility]  Questions: [phone]

## 2024-06-07 NOTE — NURSING NOTE
Transplant Coordinator Note    Reason for visit: 4th annual  Coordinator: Present   Caregiver:  none today    Health concerns addressed today:  1. Following with optho for past eye bleed.  2. Staying active- walking.  3.       Immunosuppressants:  Evero 1.5/1 Goal 6-8  MMF 1250 BID    Routine screenings:    Derm: Yearly in [place]  Dental: DUE  Colonoscopy: Due 2025  Breast/Prostate: PSA pending  Eye: UTD  Flu/Pneumonia: Pneumonia- done, Flu and Covid due this Fall, Needs Shingrix    Labs:       Additional Notes:         Meds, labs, reviewed with patient  Medication record reviewed and reconciled  Questions and concerns addressed  Pt verbalized an understanding of plan of care.     Patient Instructions  1. Make an appointment to see Dermatology for a yearly skin check.  Can see them in [place].  2. Start taking 25mg of Losartan.  Keep checking your BP daily- and we will check in next week.  3. OK to stop the Potassium.    Next transplant clinic appointment:  in year for 5th annual  Next lab draw:   Coordinator will call with all pending results.     Please call transplant coordinator with any questions:    Angelika Muller RN BSN   Post Heart Transplant Nurse Coordinator  [facility]  Questions: [phone]

## 2024-06-10 LAB
ATRIAL RATE - MUSE: 110 BPM
DIASTOLIC BLOOD PRESSURE - MUSE: NORMAL MMHG
INTERPRETATION ECG - MUSE: NORMAL
P AXIS - MUSE: 30 DEGREES
PR INTERVAL - MUSE: 136 MS
QRS DURATION - MUSE: 112 MS
QT - MUSE: 340 MS
QTC - MUSE: 460 MS
R AXIS - MUSE: 40 DEGREES
SYSTOLIC BLOOD PRESSURE - MUSE: NORMAL MMHG
T AXIS - MUSE: 42 DEGREES
VENTRICULAR RATE- MUSE: 110 BPM

## 2024-06-11 ENCOUNTER — TELEPHONE (OUTPATIENT)
Dept: TRANSPLANT | Facility: CLINIC | Age: 64
End: 2024-06-11
Payer: COMMERCIAL

## 2024-06-12 ENCOUNTER — TELEPHONE (OUTPATIENT)
Dept: TRANSPLANT | Facility: CLINIC | Age: 64
End: 2024-06-12
Payer: COMMERCIAL

## 2024-06-12 DIAGNOSIS — Z94.1 STATUS POST HEART TRANSPLANTATION (H): ICD-10-CM

## 2024-06-12 RX ORDER — EVEROLIMUS 0.5 MG/1
TABLET ORAL
Qty: 540 TABLET | Refills: 3 | Status: SHIPPED | OUTPATIENT
Start: 2024-06-12

## 2024-06-12 NOTE — TELEPHONE ENCOUNTER
Pt called stated he sent the RNCC his BP readings through his Mychart and he also took 2 hydrALAZINE (APRESOLINE) 50 MG tablet because to BP the bottom numbers were over 90. Caller would like a return call.

## 2024-06-12 NOTE — LETTER
2024    Patient Name: Mariusz Sharp   :  1960   MRN: [id number]  ICD10:  z94.1 , z79.899    Subject: Request for Labs    Mariusz Sharp is a heart transplant patient currently being followed by the [facility].  We would like to request your assistance in obtaining the following laboratory tests which are part of our routine surveillance program for heart transplant recipients.  (Items needed are checked.)    Please fax the lab results as soon as they are available to:   Thoracic Transplant Department  Fax Number:  [phone]     Item Frequency   X CBC with platelets Once in 2024   X Basic metabolic panel (including:  BUN,   Serum Creatinine, Sodium, Potassium, Chloride,   CO2, Calcium, Magnesium, Phosphorus, and   Glucose) Once in 2024   X Everolimus level  (Should be mailed to the [facility] in the  provided to you by the patient.) Once in 2024     Thank you  for your continued support and the opportunity to collaborate in the care of this patient.  If you have any questions, please call the Thoracic Transplant Team at [phone] or [phone].      Dr. Gerardo Stone  [facility] Division of Cardiology

## 2024-06-12 NOTE — TELEPHONE ENCOUNTER
Call returned to pt.  Pt states his BP was over 90 systolic yesterday while holding his Hydralazine.    Pt has decided to decrease Hydralazine dose to 100mg TID for now.  Goal is to wean pt off of Hydral.    Pt will continue to send RNCC BP readings.  Will update ANNE-MARIE with BP reading.    Remaining results from last week's visit also reviewed with pt.    CMV and EBV neg.  Allomap WNL.    Everolimus level 5.3- good 12 hour trough.  Goal 6-8.    Evero dose increase to 1.5mg BID.    Pt will recheck a level next week along with a BMP.    Orders faxed to [facility] and pt states understanding all instructions and plan of care.

## 2024-06-14 DIAGNOSIS — Z94.1 HEART REPLACED BY TRANSPLANT (H): ICD-10-CM

## 2024-06-14 RX ORDER — LOSARTAN POTASSIUM 25 MG/1
50 TABLET ORAL DAILY
Qty: 180 TABLET | Refills: 3 | Status: SHIPPED | OUTPATIENT
Start: 2024-06-14

## 2024-06-18 LAB
DONOR IDENTIFICATION: NORMAL
DSA COMMENTS: NORMAL
DSA PRESENT: NO
DSA TEST METHOD: NORMAL
ORGAN: NORMAL
SA 1  COMMENTS: NORMAL
SA 1 CELL: NORMAL
SA 1 TEST METHOD: NORMAL
SA 2 CELL: NORMAL
SA 2 COMMENTS: NORMAL
SA 2 TEST METHOD: NORMAL
SA1 HI RISK ABY: NORMAL
SA1 MOD RISK ABY: NORMAL
SA2 HI RISK ABY: NORMAL
SA2 MOD RISK ABY: NORMAL
UNACCEPTABLE ANTIGENS: NORMAL
UNOS CPRA: 0

## 2024-06-20 ENCOUNTER — LAB (OUTPATIENT)
Dept: LAB | Facility: CLINIC | Age: 64
End: 2024-06-20

## 2024-06-20 DIAGNOSIS — Z94.1 STATUS POST HEART TRANSPLANTATION (H): ICD-10-CM

## 2024-06-20 PROCEDURE — 80169 DRUG ASSAY EVEROLIMUS: CPT | Performed by: INTERNAL MEDICINE

## 2024-06-21 LAB
EVEROLIMUS BLD-MCNC: 8.2 UG/L (ref 3–8)
TME LAST DOSE: ABNORMAL H
TME LAST DOSE: ABNORMAL H

## 2024-07-14 ENCOUNTER — HEALTH MAINTENANCE LETTER (OUTPATIENT)
Age: 64
End: 2024-07-14

## 2024-08-01 ENCOUNTER — VIRTUAL VISIT (OUTPATIENT)
Dept: CARDIOLOGY | Facility: CLINIC | Age: 64
End: 2024-08-01
Attending: PHYSICIAN ASSISTANT
Payer: COMMERCIAL

## 2024-08-01 DIAGNOSIS — Z79.4 TYPE 2 DIABETES MELLITUS WITH HYPERGLYCEMIA, WITH LONG-TERM CURRENT USE OF INSULIN (H): Primary | ICD-10-CM

## 2024-08-01 DIAGNOSIS — E11.65 TYPE 2 DIABETES MELLITUS WITH HYPERGLYCEMIA, WITH LONG-TERM CURRENT USE OF INSULIN (H): Primary | ICD-10-CM

## 2024-08-01 NOTE — Clinical Note
2024      RE: Mariusz Sharp  [address] [facility] [address]       Dear Colleague,    Thank you for the opportunity to participate in the care of your patient, Mariusz Sharp, at the [facility] [place] [facility]. Please see a copy of my visit note below.    Medication Therapy Management (MTM) Encounter    ASSESSMENT:                            Medication Adherence/Access: {adherencechoices:343073}    ***:   ***    PLAN:                            Increase Ozempic to 2 mg weekly ***    Follow-up: {followuptest2:196187}    SUBJECTIVE/OBJECTIVE:                          Red Sharp is a 63 year old male seen for a follow up visit with MTM pharmacist referred by Dr. Schafer.     Reason for visit: Possibly increase Ozempic dose .    Allergies/ADRs: {1/2/3/4/5:496979}  Past Medical History: {1/2/3/4/5:105437}  Tobacco: He reports that he has never smoked. He has never used smokeless tobacco.  Alcohol: {ALCOHOL CONSUMPTION HX:365438}  {Social and Goals:190771}  Medication Adherence/Access: {fumedadherence:358336}    Diabetes  Freestyle Osiris 2 Sensor   Farxiga 10 mg tablets take once daily   Lantus 34 units at bedtime  Humalog Kwikpen 1 unit for every 8 g of carbs with meals and snacks, plus correction (about 70 units per day)  Ozempic 1mg dose pen  Aspirin 81mg EC tablet     {sideeffects:681026}  Current diabetes symptoms: {Diabetes Symptoms:463220}  Blood sugar monitoring: {MTM self monitorin}        Diet/Exercise: ***       Hypertension  Losartan 25 mg tablets once daily  Hydralazine 50 mg tablets take 2 and 1/2 tablets by mouth three times daily   Amlodipine 10 mg tablets take once daily *** last filled 10/31/23 for 90 ds  Torsemide 20 mg tablets once daily   Patient reports {side effects:171703}  Patient {HTNDoes/doesnot:584083}.       Hyperlipidemia  Rosuvastatin 20 mg tablets take once daily   {LIPID TREATMENT:971600}  Patient  "reports {mtmlipidsideeffect:109944}  {lipidascvd:837990}     ***    GERD   Pantoprazole 40 mg tablets take once daily    Transplant  Mycophenolate 250 mg capsules  Everolimus 0.5 mg tablets take 3 tablets every monring and 3 tablets every evening ** still taking? Did not find recent fill hx on CE    ***    Today's Vitals: There were no vitals taken for this visit.  ----------------  {FAY?:344338}    I spent {[facility] total time 3:896731} with this patient today{MTMpartdbillingquestion:013478}. { :148410}. A copy of the visit note was provided to the patient's provider(s).    A summary of these recommendations {GIVEN/NOT GIVEN:949281}.    ***    Telemedicine Visit Details  Type of service:  {[facility]:071227::\"Telephone visit\"}  Start Time: {video/phone visit start time:152948}  End Time: {video/phone visit end time:152948}     Medication Therapy Recommendations  No medication therapy recommendations to display         Please do not hesitate to contact me if you have any questions/concerns.     Sincerely,     Warner Cedeño RPH  "

## 2024-08-01 NOTE — PROGRESS NOTES
Medication Therapy Management (MTM) Encounter    ASSESSMENT:                            Medication Adherence/Access: See below for considerations    Type 2 Diabetes:   A1C above goal of < 7%, nearing time in range goal of > 70% since initiation of Ozempic. Given tolerating well, would benefit from dose increase today to help achieve glycemic goals and ideally reduce insulin need. Reasonable to relax insulin program to mitigate risk of hypoglycemia in anticipation of Ozempic dose increase.     Due to time constraints, I was only able to assess the above with the patient today. Will follow-up on other disease states in future encounters    PLAN:                            When completed with the 1 mg doses, increase Ozempic to 2 mg weekly     When you make the Ozempic dose change, please decrease Lantus to 32 units daily and continue to decrease by 2 units every 3 days if FPG consistently < 100    When you make the Ozempic dose change, consider weakening ICR 1:9 and 1:18 if starting to exercise heavily    Please let me know if you have any low blood sugars < 70    Endocrine Team & Next Follow-Up:  No follow-up scheduled with Leticia Schafer PA-C   6 weeks over [facility] with Warner     SUBJECTIVE/OBJECTIVE:                          Red Sharp is a 63 year old male seen for a follow up visit with MTM pharmacist referred by Dr. Schafer.     Reason for visit: Possibly increase Ozempic dose .    Allergies/ADRs: Reviewed in chart  Past Medical History: Reviewed in chart  Tobacco: He reports that he has never smoked. He has never used smokeless tobacco.    Medication Adherence/Access: no issues reported    Diabetes   Farxiga 10 mg tablets take once daily   Lantus 34 units at bedtime  Humalog Kwikpen 1 unit for every 8 g of carbs with meals and snacks, plus correction (about 70 units per day). Usually 8-10 units with meals. Misses 2-3 times per week due to busy schedule   Ozempic 1mg weekly - no side effects     Blood sugar  monitoring: Continuous Glucose Monitor - Osiris         Lab Results   Component Value Date    A1C 7.3 06/07/2024    A1C 7.4 05/11/2023    A1C  02/15/2023      Comment:      The previously reported result may be inaccurate due to a laboratory instrument calibration issue. Providers should order a new test to be performed if clinically indicated. [facility] will issue a credit for this test.  This is a corrected result. Previous result was 9.6 % on 2/15/2023 at  5:30 PM CST    A1C 8.3 05/18/2022    A1C 8.6 02/23/2022    A1C 10.3 06/23/2021    A1C 10.0 03/03/2021    A1C 6.2 10/29/2020    A1C 7.4 08/19/2020    A1C 7.2 08/13/2020     Lab Results   Component Value Date    GFRESTIMATED 54 (L) 06/07/2024    GFRESTIMATED 44 (L) 05/11/2023    GFRESTIMATED 44 (L) 02/15/2023          Today's Vitals: There were no vitals taken for this visit.  ----------------      I spent 15 minutes with this patient today. All changes were made via collaborative practice agreement with Leticia Schafer PA-C. A copy of the visit note was provided to the patient's provider(s).    A summary of these recommendations was given to the patient.    Warner Cedeño, PharmD, [facility]  Endocrine & Diabetes [facility] Pharmacist  [address] [place] [id number]    Telemedicine Visit Details  Type of service:  Video Conference via [employer]  Start Time:  1PM  End Time:  1:15PM     Medication Therapy Recommendations  Type 2 diabetes mellitus with hyperglycemia (H)    Current Medication: OZEMPIC, 1 MG/DOSE, 4 MG/3ML pen (Discontinued)   Rationale: Dose too low - Dosage too low - Effectiveness   Recommendation: Increase Dose   Status: Accepted per CPA

## 2024-08-05 NOTE — TELEPHONE ENCOUNTER
VM left with pt asking him to return call to review recent lab results.  
Communicate Risk of Fall with Harm to all staff/Reinforce activity limits and safety measures with patient and family/Tailored Fall Risk Interventions/Visual Cue: Yellow wristband and red socks/Bed in lowest position, wheels locked, appropriate side rails in place/Call bell, personal items and telephone in reach/Instruct patient to call for assistance before getting out of bed or chair/Non-slip footwear when patient is out of bed/[place] to call system/Physically safe environment - no spills, clutter or unnecessary equipment/Purposeful Proactive Rounding/Room/bathroom lighting operational, light cord in reach

## 2024-08-07 RX ORDER — SEMAGLUTIDE 2.68 MG/ML
2 INJECTION, SOLUTION SUBCUTANEOUS
Qty: 9 ML | Refills: 3 | Status: SHIPPED | OUTPATIENT
Start: 2024-08-07

## 2024-08-12 NOTE — PATIENT INSTRUCTIONS
When completed with the 1 mg doses, increase Ozempic to 2 mg weekly     When you make the Ozempic dose change, please decrease Lantus to 32 units daily and continue to decrease by 2 units every 3 days if FPG consistently < 100    When you make the Ozempic dose change, consider weakening ICR 1:9 and 1:18 if starting to exercise heavily    Please let me know if you have any low blood sugars < 70

## 2024-08-30 NOTE — PROGRESS NOTES
"TCU Care Coordinator Progress Note    Admission date: 1/11/2019    Data: Mariusz \"Red\" Aron is a 59 yo male on TCU for rehab and LVAD teaching following hospitalization for LVAD placement.    Intervention: Met with patient to introduce the role of Care Coordinator and to begin discussion of anticipated discharge planning needs.     Assessment: Patient will start Cardiac Rehab after discharge from TCU, order entered in Epic. He is on Coumadin, was on Coumadin previously, no new teaching needs. [facility] will follow Coumadin management needs. LVAD teaching going well, three caregivers have been trained. He will stay at [facility] with his brother upon discharge from TCU. He is on Heparin gtt until INR therapeutic. He is diabetic, on insulin, has a glucometer, no new teaching needs.    Plan: Will continue to follow discharge planning needs throughout TCU stay. Potential discharge within a few days if he gets off Heparin gtt, meets TCU therapy goals, and remains medically stable.    Asif Elias RN, BSN, Patient Care Management Coordinator  [facility]  [address] [place] [address]  [email or web address]  [email or web address]   Desk: [phone] TCU Main [phone] Fax [phone] Pager [phone]    " well developed, well nourished , in no acute distress , ambulating without difficulty , normal communication ability

## 2024-09-16 DIAGNOSIS — E11.65 TYPE 2 DIABETES MELLITUS WITH HYPERGLYCEMIA, WITH LONG-TERM CURRENT USE OF INSULIN (H): ICD-10-CM

## 2024-09-16 DIAGNOSIS — Z79.4 TYPE 2 DIABETES MELLITUS WITH HYPERGLYCEMIA, WITH LONG-TERM CURRENT USE OF INSULIN (H): ICD-10-CM

## 2024-10-02 ENCOUNTER — TELEPHONE (OUTPATIENT)
Dept: ENDOCRINOLOGY | Facility: CLINIC | Age: 64
End: 2024-10-02
Payer: COMMERCIAL

## 2024-10-02 ENCOUNTER — MYC REFILL (OUTPATIENT)
Dept: ENDOCRINOLOGY | Facility: CLINIC | Age: 64
End: 2024-10-02
Payer: COMMERCIAL

## 2024-10-02 DIAGNOSIS — E11.65 TYPE 2 DIABETES MELLITUS WITH HYPERGLYCEMIA, WITH LONG-TERM CURRENT USE OF INSULIN (H): ICD-10-CM

## 2024-10-02 DIAGNOSIS — Z79.4 TYPE 2 DIABETES MELLITUS WITH HYPERGLYCEMIA, WITH LONG-TERM CURRENT USE OF INSULIN (H): ICD-10-CM

## 2024-10-02 RX ORDER — INSULIN LISPRO 100 [IU]/ML
INJECTION, SOLUTION INTRAVENOUS; SUBCUTANEOUS
Qty: 75 ML | Refills: 0 | Status: SHIPPED | OUTPATIENT
Start: 2024-10-02

## 2024-10-02 NOTE — TELEPHONE ENCOUNTER
Patient confirmed scheduled appointment:  Date: 10/03  Time: 1:00  Visit type: return diabetes  Provider: Hanh  Location: virtual  Testing/imaging:   Additional notes: Spoke with patient and he said he should be in [place] for the visit.     Jaimie Pizarro RN   to Jeannette Schafer PA-C  [id number]   DM      10/2/24  2:19 PM      Leticia: Insulin Protocol Bscefw48/02/2024 01:31 PM  Protocol Details Has GFR on file in past 12 months and most recent value is normal    CCs: Transplant pt. please help schedule follow up visit, first available, any PA, any location, ok to use PHILLIP.    Raegan Morin on 10/2/2024 at 3:04 PM

## 2024-10-02 NOTE — TELEPHONE ENCOUNTER
Insulin Protocol Eyfegl54/02/2024 01:31 PM   Protocol Details Has GFR on file in past 12 months and most recent value is normal

## 2024-10-03 ENCOUNTER — VIRTUAL VISIT (OUTPATIENT)
Dept: ENDOCRINOLOGY | Facility: CLINIC | Age: 64
End: 2024-10-03
Payer: COMMERCIAL

## 2024-10-03 DIAGNOSIS — Z94.1 STATUS POST HEART TRANSPLANTATION (H): ICD-10-CM

## 2024-10-03 PROCEDURE — 99214 OFFICE O/P EST MOD 30 MIN: CPT | Mod: 95 | Performed by: PHYSICIAN ASSISTANT

## 2024-10-03 ASSESSMENT — PAIN SCALES - GENERAL: PAINLEVEL: NO PAIN (0)

## 2024-10-03 NOTE — PROGRESS NOTES
Time of start: 1:03 pm   Time of end: 1:17 pm   Total duration of video visit: 14 minutes.  Providers location: offsite.  Patients location: [place].    CHANELL  Mariusz ( Red ) ISABEL Sharp is a 63 year old male with type 2 diabetes mellitus. Video visit today for diabetes follow up.  Last visit with me was in Nov 2023.  Pt has also been seen by [facility] Cedeño [facility].  Pt was admitted 5/17/2020-5/29/2020 and underwent a heart transplant on 5/18/2020.  Pt's hx is significant for type 2 diabetes mellitus dx 8 + years ago, HTN, hyperlipidemia, hx of ischemic heart disease s/p STEMI with ALYSSA 2019, LVAD placement, Stage 3 CKD, and obesity.  Red was also admitted 5/12-5/15/2020 for ANDREW due to nephrolithiasis complicated by hydronephrosis/ANDREW.  For his diabetes, pt is taking Ozempic 2 mg subcutaneous once a week ( he has been taking this dose for 3 weeks), Lantus 32 units subcutaneous each pm and Humalog 1 unit/8 gms CHO with meals with correction scale  ( 1 unit/25 for BG > 150 ) and bedtime correction scale ( 1 unit/25 for BG > 200).   He is also taking Farxiga 10 mg each am.  Most recent A1C was 7.3 % on 6/7/2024.  Previous A1C was 7.0 % in 11/2023.  Red is using a Freestyle Libre2 sensor to monitor his blood sugars.  His sensor download data is scanned in his note below.  Glucose is in target 80 % of the time. No frequent hypoglycemia.  Average glucose is 150 with an estimated A1C of 6.9 %.  On ROS today,mild tingling in both feet. Denies foot ulcers/sores.  Chronic constipation. I asked him to use Miralax as needed.  Weight is down to 180 lbs today.   Pt denies blurred vision, n/v, SOB at rest,cough,fever,chest pain, abd pain, diarrhea, dysuria or hematuria.  Pt denies sx of groin yeast infection at this time.    Diabetes Care  Retinopathy:none; pt seen by Oph with no retinopathy per patient.  Nephropathy:yes- hx of CKD/ANDREW.Pt taking Losartan.  Neuropathy: mild tingling in both feet.  Foot Exam:no exam today.  Taking aspirin:  yes.  Lipids: LDL 92 in 6/2024.  Pt is taking Crestor.  CAD: yes; s/p heart transplant on 5/18/2020.  Depression: no.  Insulin: Basal and meal time insulin with correction scale ( 1 unit/25 for BG > 150 with meals and > 200 at bedtime).    DM meds: Farxiga and Ozempic. MTM to start/titrate Mounjaro which will replace Ozempic.  Testing: Freestyle Libre2 sensor.            ROS  See under HPI.    Allergies  No Known Allergies    Medications  Current Outpatient Medications   Medication Sig Dispense Refill    acetaminophen (TYLENOL) 325 MG tablet Take 2 tablets (650 mg) by mouth every 4 hours as needed for other (multimodal surgical pain management along with NSAIDS and opioid medication as indicated based on pain control and physical function.)      amLODIPine (NORVASC) 10 MG tablet Take 1 tablet (10 mg) by mouth daily 90 tablet 3    aspirin (ASA) 81 MG EC tablet Take 1 tablet (81 mg) by mouth daily 30 tablet 11    BD SILVANA U/F 32G X 4 MM insulin pen needle Use 6 times daily. 600 each 3    calcium carbonate 600 mg-vitamin D 400 units (CALTRATE) 600-400 MG-UNIT per tablet Take 1 tablet by mouth 2 times daily (with meals)      Continuous Glucose Sensor (FREESTYLE DARRELL 2 SENSOR) MISC USE 1 EACH EVERY 14 DAYS USE 1 SENSOR EVERY 14 DAYS. USE TO READ BLOOD SUGARS PER MANUFACTUER'S 2 each 5    dapagliflozin (FARXIGA) 10 MG TABS tablet Take 1 tablet (10 mg) by mouth daily 90 tablet 3    everolimus (ZORTRESS) 0.5 MG tablet Take 3 tablets (1.5 mg) by mouth every morning AND 3 tablets (1.5 mg) every evening. 540 tablet 3    HUMALOG KWIKPEN 100 UNIT/ML soln INJECT 1 UNIT PER EVERY 8 GRAMS OF CARBS WITH MEALS AND SNACKS, PLUS CORRECTION, APPROX 70 UNITS IN 24 HOURS. Follow up visit needed. Call  to schedule the first available visit. 75 mL 0    insulin glargine (LANTUS PEN) 100 UNIT/ML pen Inject 34 units subcutaneous at hs. 15 mL 3    loperamide (IMODIUM) 2 MG capsule Take 1 capsule (2 mg) by mouth 4 times daily as  needed for diarrhea 60 capsule 0    losartan (COZAAR) 25 MG tablet Take 2 tablets (50 mg) by mouth daily 180 tablet 3    magnesium oxide (MAG-OX) 400 (241.3 Mg) MG tablet Take 1 tablet (400 mg) by mouth daily 90 tablet 3    multivitamin w/minerals (THERA-VIT-M) tablet Take 1 tablet by mouth daily 90 tablet 3    mycophenolate (GENERIC EQUIVALENT) 250 MG capsule Take 5 capsules (1,250 mg) by mouth 2 times daily 300 capsule 11    pantoprazole (PROTONIX) 40 MG EC tablet Take 1 tablet (40 mg) by mouth every morning (before breakfast) 90 tablet 3    rosuvastatin (CRESTOR) 20 MG tablet TAKE 1 TABLET(20 MG) BY MOUTH DAILY 90 tablet 3    Semaglutide, 2 MG/DOSE, (OZEMPIC, 2 MG/DOSE,) 8 MG/3ML pen Inject 2 mg subcutaneously every 7 days 9 mL 3    torsemide (DEMADEX) 20 MG tablet Take 1 tablet (20 mg) by mouth daily 90 tablet 3    ACCU-CHEK [place] PLUS test strip Check BG 3 times daily at meals and at bedtime. 400 strip 3       Family History  Mother and father with hx of type 2 DM.  Social History   reports that he has never smoked. He has never used smokeless tobacco. He reports that he does not drink alcohol and does not use drugs.     Past Medical History  Past Medical History:   Diagnosis Date    Acute kidney injury (H)     CKD (chronic kidney disease)     Coronary artery disease     Diabetes mellitus (H)     HTN (hypertension)     Hyperlipidemia     ICD (implantable cardioverter-defibrillator), single chamber- NOT dependent 7/17/2018    Ischemic cardiomyopathy     LV (left ventricular) mural thrombus     Paroxysmal atrial flutter (H)     RVF (right ventricular failure) (H)     Systolic heart failure (H)     Ventricular tachycardia (H)        Past Surgical History:   Procedure Laterality Date    BRONCHOSCOPY (RIGID OR FLEXIBLE), DIAGNOSTIC N/A 9/15/2020    Procedure: BRONCHOSCOPY, WITH BRONCHOALVEOLAR LAVAGE;  Surgeon: Elder Mejia MD;  Location:  GI    BRONCHOSCOPY (RIGID OR FLEXIBLE), DIAGNOSTIC N/A 9/17/2020     Procedure: BRONCHOSCOPY, WITH BRONCHOALVEOLAR LAVAGE;  Surgeon: Bill Lemus MD;  [facility]    BRONCHOSCOPY RIGID OR FLEXIBLE W/TRANSENDOSCOPIC ENDOBRONCHIAL ULTRASOUND GUIDED Right 9/8/2020    Procedure: BRONCHOSCOPY, WITH ENDOBRONCHIAL ULTRASOUND;  Surgeon: Donny Mercedes MD;  [facility]    COLONOSCOPY N/A 12/7/2018    Procedure: COLONOSCOPY;  Surgeon: Krish Mercado MD;  [facility]    CV CORONARY ANGIOGRAM N/A 6/23/2021    Procedure: CV CORONARY ANGIOGRAM;  Surgeon: Brian Long MD;  Location:  HEART CARDIAC CATH LAB    CV CORONARY ANGIOGRAM N/A 5/18/2022    Procedure: Coronary Angiogram [[id number]];  Surgeon: Khris Mcclelland MD;  Location:  HEART CARDIAC CATH LAB    CV CORONARY ANGIOGRAM N/A 5/11/2023    Procedure: Coronary Angiogram;  Surgeon: Khris Mcclelland MD;  Location:  HEART CARDIAC CATH LAB    CV CORONARY ANGIOGRAM N/A 6/7/2024    Procedure: Coronary Angiogram;  Surgeon: Kit Bacon MD;  Location:  HEART CARDIAC CATH LAB    CV HEART BIOPSY N/A 5/24/2020    Procedure: Heart Biopsy;  Surgeon: Kit Bacon MD;  Location:  HEART CARDIAC CATH LAB    CV HEART BIOPSY N/A 6/1/2020    Procedure: CV HEART BIOPSY;  Surgeon: Kit Bacon MD;  Location:  HEART CARDIAC CATH LAB    CV HEART BIOPSY N/A 6/8/2020    Procedure: CV HEART BIOPSY;  Surgeon: Kit Bacon MD;  Location:  HEART CARDIAC CATH LAB    CV HEART BIOPSY N/A 6/15/2020    Procedure: CV HEART BIOPSY;  Surgeon: Kit Bacon MD;  Location:  HEART CARDIAC CATH LAB    CV HEART BIOPSY N/A 6/30/2020    Procedure: CV HEART BIOPSY;  Surgeon: Kit Bacon MD;  Location:  HEART CARDIAC CATH LAB    CV HEART BIOPSY N/A 7/14/2020    Procedure: CV HEART BIOPSY;  Surgeon: Brian Long MD;  Location:  HEART CARDIAC CATH LAB    CV HEART BIOPSY N/A 7/29/2020    Procedure: CV HEART BIOPSY;  Surgeon:  Momo Hunt MD;  [facility]    CV HEART BIOPSY N/A 8/13/2020    Procedure: CV HEART BIOPSY;  Surgeon: Khris Mcclelland MD;  [facility]    CV HEART BIOPSY N/A 8/26/2020    Procedure: CV HEART BIOPSY;  Surgeon: Momo Hunt MD;  [facility]    CV HEART BIOPSY N/A 9/30/2020    Procedure: CV HEART BIOPSY;  Surgeon: Artemio Ulloa MD;  [facility]    CV HEART BIOPSY N/A 10/29/2020    Procedure: CV HEART BIOPSY;  Surgeon: Kit Bacon MD;  [facility]    CV HEART BIOPSY N/A 1/5/2021    Procedure: CV HEART BIOPSY;  Surgeon: Carlin Garcia MD;  [facility]    CV HEART BIOPSY N/A 6/23/2021    Procedure: CV HEART BIOPSY;  Surgeon: Brian Long MD;  [facility]    CV HEART BIOPSY N/A 10/26/2021    Procedure: CV HEART BIOPSY;  Surgeon: Kit Bacon MD;  [facility]    CV HEART BIOPSY N/A 5/18/2022    Procedure: Heart Biopsy;  Surgeon: Khris Mcclelland MD;  [facility]    CV HEART BIOPSY N/A 5/11/2023    Procedure: Heart Biopsy;  Surgeon: Khris Mcclelland MD;  Location:  HEART CARDIAC CATH LAB    CV RIGHT HEART CATH MEASUREMENTS RECORDED N/A 2/19/2019    Procedure: RHC;  Surgeon: Brian Long MD;  Location:  HEART CARDIAC CATH LAB    CV RIGHT HEART CATH MEASUREMENTS RECORDED N/A 7/5/2019    Procedure: CV RIGHT HEART CATH;  Surgeon: Khris Mcclelland MD;  Location:  HEART CARDIAC CATH LAB    CV RIGHT HEART CATH MEASUREMENTS RECORDED N/A 5/24/2020    Procedure: Right Heart Cath;  Surgeon: Kit Bacon MD;  Location:  HEART CARDIAC CATH LAB    CV RIGHT HEART CATH MEASUREMENTS RECORDED N/A 6/1/2020    Procedure: CV RIGHT HEART CATH;  Surgeon: Kit Bacon MD;  Location: [facility]  CARDIAC CATH LAB    CV RIGHT HEART CATH MEASUREMENTS RECORDED N/A 6/8/2020    Procedure: CV RIGHT HEART CATH;  Surgeon: Kit Bacon MD;  Location:  HEART CARDIAC CATH LAB    CV RIGHT HEART CATH MEASUREMENTS RECORDED N/A 6/15/2020    Procedure: CV RIGHT HEART CATH;  Surgeon: Kit Bacon MD;  Location:  HEART CARDIAC CATH LAB    CV RIGHT HEART CATH MEASUREMENTS RECORDED N/A 6/30/2020    Procedure: CV RIGHT HEART CATH;  Surgeon: Kit Bacon MD;  Location:  HEART CARDIAC CATH LAB    CV RIGHT HEART CATH MEASUREMENTS RECORDED N/A 7/14/2020    Procedure: CV RIGHT HEART CATH;  Surgeon: Brian Long MD;  Location:  HEART CARDIAC CATH LAB    CV RIGHT HEART CATH MEASUREMENTS RECORDED N/A 7/29/2020    Procedure: CV RIGHT HEART CATH;  Surgeon: Momo Hunt MD;  Location:  HEART CARDIAC CATH LAB    CV RIGHT HEART CATH MEASUREMENTS RECORDED N/A 8/13/2020    Procedure: CV RIGHT HEART CATH;  Surgeon: Khris Mcclelland MD;  Location:  HEART CARDIAC CATH LAB    CV RIGHT HEART CATH MEASUREMENTS RECORDED N/A 8/26/2020    Procedure: CV RIGHT HEART CATH;  Surgeon: Momo Hunt MD;  Location:  HEART CARDIAC CATH LAB    CV RIGHT HEART CATH MEASUREMENTS RECORDED N/A 9/30/2020    Procedure: Right Heart Cath;  Surgeon: Artemio Ulloa MD;  Location:  HEART CARDIAC CATH LAB    CV RIGHT HEART CATH MEASUREMENTS RECORDED N/A 10/29/2020    Procedure: CV RIGHT HEART CATH;  Surgeon: Kit Bacon MD;  Location:  HEART CARDIAC CATH LAB    CV RIGHT HEART CATH MEASUREMENTS RECORDED N/A 1/5/2021    Procedure: CV RIGHT HEART CATH;  Surgeon: Carlin Garcia MD;  Location:  HEART CARDIAC CATH LAB    CV RIGHT HEART CATH MEASUREMENTS RECORDED N/A 6/23/2021    Procedure: CV RIGHT HEART CATH;  Surgeon: Brian Long MD;  Location:  HEART CARDIAC CATH LAB    CV RIGHT HEART CATH MEASUREMENTS RECORDED N/A 10/26/2021    Procedure:  CV RIGHT HEART CATH;  Surgeon: Kit Bacon MD;  Location:  HEART CARDIAC CATH LAB    CV RIGHT HEART CATH MEASUREMENTS RECORDED N/A 5/18/2022    Procedure: Right Heart Catheterization;  Surgeon: Khris Mcclelland MD;  Location:  HEART CARDIAC CATH LAB    CV RIGHT HEART CATH MEASUREMENTS RECORDED N/A 5/11/2023    Procedure: Right Heart Catheterization;  Surgeon: Khris Mcclelland MD;  Location:  HEART CARDIAC CATH LAB    CV RIGHT HEART CATH MEASUREMENTS RECORDED N/A 6/7/2024    Procedure: Right Heart Catheterization;  Surgeon: Kit Bacon MD;  Location:  HEART CARDIAC CATH LAB    ENDOBRONCHIAL ULTRASOUND FLEXIBLE N/A 9/17/2020    Procedure: BRONCHOSCOPY, flexible, endobronchial ultrasound with transbronchial biopsies, endobronchial biopsies;  Surgeon: Bill Lemus MD;  Location:  OR     PRQ [id number] CORONARY ANGIOPLASTY ADDL BRANCH      PCI and ALYSSA to ostial LAD on 6/27/18    IMPLANT AUTOMATIC IMPLANTABLE CARDIOVERTER DEFIBRILLATOR      INSERT VENTRICULAR ASSIST DEVICE LEFT (HEARTMATE II) N/A 12/31/2018    Procedure: Median Sternotomy, On Cardiopulmonary Bypass Pump, Insertion of Left Ventricular Assist Device (Heartmate III);  Surgeon: Kamaljit Baker MD;  [facility]    PICC DOUBLE LUMEN PLACEMENT Right 05/22/2020    5FR DL PICC inserted at right basilic vein, length 38cm.    TRANSPLANT HEART RECIPIENT AFTER HEART MATE N/A 5/18/2020    Procedure: REDO MEDIAN STERNOTOMY, LYSIS OF ADHESIONS, ON CARDIOPULMONARY BYPASS PUMP, HEART TRANSPLANT RECIPIENT, REMOVAL OF LEFT VENTRICULAR ASSIST DEVICE, REMOVAL OF AUTOMATED IMPLANTABLE CARDIOVERTER DEFIBRILLATOR;  Surgeon: Elder Willis MD;  [facility]       Physical Exam    No exam today.    RESULTS  Creatinine   Date Value Ref Range Status   06/07/2024 1.47 (H) 0.67 - 1.17 mg/dL Final   06/23/2021 1.93 (H) 0.66 - 1.25 mg/dL Final     GFR Estimate   Date Value Ref Range Status   06/07/2024 54 (L) >60  mL/min/1.73m2 Final   06/23/2021 37 (L) >60 mL/min/[1.73_m2] Final     Comment:     Non  GFR Calc  Starting 12/18/2018, serum creatinine based estimated GFR (eGFR) will be   calculated using the Chronic Kidney Disease Epidemiology Collaboration   (CKD-EPI) equation.       Hemoglobin A1C   Date Value Ref Range Status   06/07/2024 7.3 (H) <5.7 % Final     Comment:     Normal <5.7%   Prediabetes 5.7-6.4%    Diabetes 6.5% or higher     Note: Adopted from ADA consensus guidelines.   06/23/2021 10.3 (H) 0 - 5.6 % Final     Comment:     Normal <5.7% Prediabetes 5.7-6.4%  Diabetes 6.5% or higher - adopted from ADA   consensus guidelines.       Potassium   Date Value Ref Range Status   06/07/2024 4.1 3.4 - 5.3 mmol/L Final   05/18/2022 3.5 3.4 - 5.3 mmol/L Final   06/23/2021 3.6 3.4 - 5.3 mmol/L Final     ALT   Date Value Ref Range Status   06/07/2024 28 0 - 70 U/L Final     Comment:     Reference intervals for this test were updated on 6/12/2023 to more accurately reflect our healthy population. There may be differences in the flagging of prior results with similar values performed with this method. Interpretation of those prior results can be made in the context of the updated reference intervals.     06/23/2021 26 0 - 70 U/L Final     AST   Date Value Ref Range Status   06/07/2024 30 0 - 45 U/L Final     Comment:     Reference intervals for this test were updated on 6/12/2023 to more accurately reflect our healthy population. There may be differences in the flagging of prior results with similar values performed with this method. Interpretation of those prior results can be made in the context of the updated reference intervals.   06/23/2021 22 0 - 45 U/L Final     TSH   Date Value Ref Range Status   03/03/2021 1.87 0.40 - 4.00 mU/L Final     T4 Free   Date Value Ref Range Status   03/03/2021 1.08 0.76 - 1.46 ng/dL Final       Cholesterol   Date Value Ref Range Status   06/07/2024 178 <200 mg/dL Final    05/11/2023 153 <200 mg/dL Final   06/23/2021 168 <200 mg/dL Final   10/29/2020 161 <200 mg/dL Final     HDL Cholesterol   Date Value Ref Range Status   06/23/2021 49 >39 mg/dL Final   10/29/2020 51 >39 mg/dL Final     Direct Measure HDL   Date Value Ref Range Status   06/07/2024 45 >=40 mg/dL Final   05/11/2023 36 (L) >=40 mg/dL Final     LDL Cholesterol Calculated   Date Value Ref Range Status   06/07/2024 92 <=100 mg/dL Final   05/11/2023 61 <=100 mg/dL Final   06/23/2021 75 <100 mg/dL Final     Comment:     Desirable:       <100 mg/dl   10/29/2020 81 <100 mg/dL Final     Comment:     Desirable:       <100 mg/dl     LDL Cholesterol Direct   Date Value Ref Range Status   10/26/2021 105 (H) <100 mg/dL Final     Comment:     Age 0-19 years:  Desirable: 0-110 mg/dL   Borderline high: 110-129 mg/dL   High: >= 130 mg/dL    Age 20 years and older:  Desirable: <100mg/dL  Above desirable: 100-129 mg/dL   Borderline high: 130-159 mg/dL   High: 160-189 mg/dL   Very high: >= 190 mg/dL     Triglycerides   Date Value Ref Range Status   06/07/2024 206 (H) <150 mg/dL Final   05/11/2023 280 (H) <150 mg/dL Final   06/23/2021 220 (H) <150 mg/dL Final     Comment:     Borderline high:  150-199 mg/dl  High:             200-499 mg/dl  Very high:       >499 mg/dl     10/29/2020 144 <150 mg/dL Final       ASSESSMENT/PLAN:      1.  TYPE 2 DIABETES MELLITUS: Glycemic control has improved with addition of Ozempic.  Red may have more weight loss and require less insulin with use of Mounjaro .  I discussed adding Mounjaro which will replace Ozempic.  Warner Cedeño [facility] will contact pt to start/titrate Mounjaro and adjust insulin as needed.  I reviewed how Mounjaro works and possible side effects of the drug including n/v, GI distress, constipation, hypoglycemia and rare risk of pancreatitis.   Pt denies hx of pancreatitis or gastroparesis. No hx of thyroid cancer.  No change in insulin doses for now.  Continue Farxiga 10 mg daily.     Reminded pt to drink plenty of water while taking Farxiga.  Again, I reviewed how Farxiga works and possible side effects including dehydration, yeast infection and possible UTI.   Reminded him to drink plenty of water.  Avoid use of Metformin due to CKD.  Continue to use Freestyle Libre2 sensor to monitor blood sugars.  Pt was seen by Oph without retinopathy per patient.  He reports mild tingling in his feet.  No foot ulcers at this time.    2.  CKD/ANDREW: Creat 1.60 with GFR 49 mL/min on 6/20243.  Avoid use of Metformin.  Pt taking Farxiga.    3.  HX OF ISCHEMIC CARDIOMYOPATHY: S/P heart transplant on 5/18/2020 and followed closely by transplant staff here.    4.  HTN: No vitals today. Continue current RXs.    5.  WEIGHT GAIN: Some weight loss with addition of Ozempic.  Reminded patient to make healthy food choices, reduce food portions with meals, try to avoid snacking and increase activity.  Will plan to switch pt to Mounjaro which will replace Ozempic. He may see additional weight loss and need less insulin with Mounjaro.  I have asked Warner Velasquez to reach out to patient to start/titrate Mounjaro.     6  FOLLOW UP: with me in 4 months.      Time spent reviewing chart, labs and Freestyle Libre2 sensor data today = 5 minutes.  Time for video visit today = 14 minutes.  Time for documentation today = 15 minutes.    TOTAL TIME FOR VIDEO VISIT TODAY= 34 minutes.    Jeannette Schafer PA-C

## 2024-10-03 NOTE — LETTER
10/3/2024       RE: Mariusz Sharp  [address] [facility] [address]     Dear Colleague,    Thank you for referring your patient, Mariusz Sharp, to the [facility] ENDOCRINOLOGY [facility]IC [place] at [facility]. Please see a copy of my visit note below.    Outcome for 10/02/24 3:28 PM: Data obtained via [employer] website  Pili Bell LPN                   Time of start: 1:03 pm   Time of end: 1:17 pm   Total duration of video visit: 14 minutes.  Providers location: offsite.  Patients location: [place].    [facility]  Mariusz ( Red ) ISABEL Sharp is a 63 year old male with type 2 diabetes mellitus. Video visit today for diabetes follow up.  Last visit with me was in Nov 2023.  Pt has also been seen by Warner Cedeño [facility].  Pt was admitted 5/17/2020-5/29/2020 and underwent a heart transplant on 5/18/2020.  Pt's hx is significant for type 2 diabetes mellitus dx 8 + years ago, HTN, hyperlipidemia, hx of ischemic heart disease s/p STEMI with ALYSSA 2019, LVAD placement, Stage 3 CKD, and obesity.  Red was also admitted 5/12-5/15/2020 for ANDREW due to nephrolithiasis complicated by hydronephrosis/ANDREW.  For his diabetes, pt is taking Ozempic 2 mg subcutaneous once a week ( he has been taking this dose for 3 weeks), Lantus 32 units subcutaneous each pm and Humalog 1 unit/8 gms CHO with meals with correction scale  ( 1 unit/25 for BG > 150 ) and bedtime correction scale ( 1 unit/25 for BG > 200).   He is also taking Farxiga 10 mg each am.  Most recent A1C was 7.3 % on 6/7/2024.  Previous A1C was 7.0 % in 11/2023.  Red is using a Freestyle Libre2 sensor to monitor his blood sugars.  His sensor download data is scanned in his note below.  Glucose is in target 80 % of the time. No frequent hypoglycemia.  Average glucose is 150 with an estimated A1C of 6.9 %.  On ROS today,mild tingling in both feet. Denies foot ulcers/sores.  Chronic constipation. I asked him to use Miralax as needed.  Weight is  down to 180 lbs today.   Pt denies blurred vision, n/v, SOB at rest,cough,fever,chest pain, abd pain, diarrhea, dysuria or hematuria.  Pt denies sx of groin yeast infection at this time.    Diabetes Care  Retinopathy:none; pt seen by Oph with no retinopathy per patient.  Nephropathy:yes- hx of CKD/ANDREW.Pt taking Losartan.  Neuropathy: mild tingling in both feet.  Foot Exam:no exam today.  Taking aspirin: yes.  Lipids: LDL 92 in 6/2024.  Pt is taking Crestor.  CAD: yes; s/p heart transplant on 5/18/2020.  Depression: no.  Insulin: Basal and meal time insulin with correction scale ( 1 unit/25 for BG > 150 with meals and > 200 at bedtime).    DM meds: Farxiga and Ozempic. MTM to start/titrate Mounjaro which will replace Ozempic.  Testing: Freestyle Libre2 sensor.            ROS  See under HPI.    Allergies  No Known Allergies    Medications  Current Outpatient Medications   Medication Sig Dispense Refill     acetaminophen (TYLENOL) 325 MG tablet Take 2 tablets (650 mg) by mouth every 4 hours as needed for other (multimodal surgical pain management along with NSAIDS and opioid medication as indicated based on pain control and physical function.)       amLODIPine (NORVASC) 10 MG tablet Take 1 tablet (10 mg) by mouth daily 90 tablet 3     aspirin (ASA) 81 MG EC tablet Take 1 tablet (81 mg) by mouth daily 30 tablet 11     BD SILVANA U/F 32G X 4 MM insulin pen needle Use 6 times daily. 600 each 3     calcium carbonate 600 mg-vitamin D 400 units (CALTRATE) 600-400 MG-UNIT per tablet Take 1 tablet by mouth 2 times daily (with meals)       Continuous Glucose Sensor (FREESTYLE DARRELL 2 SENSOR) MISC USE 1 EACH EVERY 14 DAYS USE 1 SENSOR EVERY 14 DAYS. USE TO READ BLOOD SUGARS PER MANUFACTUER'S 2 each 5     dapagliflozin (FARXIGA) 10 MG TABS tablet Take 1 tablet (10 mg) by mouth daily 90 tablet 3     everolimus (ZORTRESS) 0.5 MG tablet Take 3 tablets (1.5 mg) by mouth every morning AND 3 tablets (1.5 mg) every evening. 540 tablet 3      HUMALOG KWIKPEN 100 UNIT/ML soln INJECT 1 UNIT PER EVERY 8 GRAMS OF CARBS WITH MEALS AND SNACKS, PLUS CORRECTION, APPROX 70 UNITS IN 24 HOURS. Follow up visit needed. Call  to schedule the first available visit. 75 mL 0     insulin glargine (LANTUS PEN) 100 UNIT/ML pen Inject 34 units subcutaneous at hs. 15 mL 3     loperamide (IMODIUM) 2 MG capsule Take 1 capsule (2 mg) by mouth 4 times daily as needed for diarrhea 60 capsule 0     losartan (COZAAR) 25 MG tablet Take 2 tablets (50 mg) by mouth daily 180 tablet 3     magnesium oxide (MAG-OX) 400 (241.3 Mg) MG tablet Take 1 tablet (400 mg) by mouth daily 90 tablet 3     multivitamin w/minerals (THERA-VIT-M) tablet Take 1 tablet by mouth daily 90 tablet 3     mycophenolate (GENERIC EQUIVALENT) 250 MG capsule Take 5 capsules (1,250 mg) by mouth 2 times daily 300 capsule 11     pantoprazole (PROTONIX) 40 MG EC tablet Take 1 tablet (40 mg) by mouth every morning (before breakfast) 90 tablet 3     rosuvastatin (CRESTOR) 20 MG tablet TAKE 1 TABLET(20 MG) BY MOUTH DAILY 90 tablet 3     Semaglutide, 2 MG/DOSE, (OZEMPIC, 2 MG/DOSE,) 8 MG/3ML pen Inject 2 mg subcutaneously every 7 days 9 mL 3     torsemide (DEMADEX) 20 MG tablet Take 1 tablet (20 mg) by mouth daily 90 tablet 3     ACCU-CHEK [place] PLUS test strip Check BG 3 times daily at meals and at bedtime. 400 strip 3       Family History  Mother and father with hx of type 2 DM.  Social History   reports that he has never smoked. He has never used smokeless tobacco. He reports that he does not drink alcohol and does not use drugs.     Past Medical History  Past Medical History:   Diagnosis Date     Acute kidney injury (H)      CKD (chronic kidney disease)      Coronary artery disease      Diabetes mellitus (H)      HTN (hypertension)      Hyperlipidemia      ICD (implantable cardioverter-defibrillator), single chamber- NOT dependent 7/17/2018     Ischemic cardiomyopathy      LV (left ventricular) mural  thrombus      Paroxysmal atrial flutter (H)      RVF (right ventricular failure) (H)      Systolic heart failure (H)      Ventricular tachycardia (H)        Past Surgical History:   Procedure Laterality Date     BRONCHOSCOPY (RIGID OR FLEXIBLE), DIAGNOSTIC N/A 9/15/2020    Procedure: BRONCHOSCOPY, WITH BRONCHOALVEOLAR LAVAGE;  Surgeon: Elder Mejia MD;  Location:  GI     BRONCHOSCOPY (RIGID OR FLEXIBLE), DIAGNOSTIC N/A 9/17/2020    Procedure: BRONCHOSCOPY, WITH BRONCHOALVEOLAR LAVAGE;  Surgeon: Bill Lemus MD;  Location:  OR     BRONCHOSCOPY RIGID OR FLEXIBLE W/TRANSENDOSCOPIC ENDOBRONCHIAL ULTRASOUND GUIDED Right 9/8/2020    Procedure: BRONCHOSCOPY, WITH ENDOBRONCHIAL ULTRASOUND;  Surgeon: Donny Mercedes MD;  Location:  GI     COLONOSCOPY N/A 12/7/2018    Procedure: COLONOSCOPY;  Surgeon: Krish Mercado MD;  [facility]: [facility]     CV CORONARY ANGIOGRAM N/A 6/23/2021    Procedure: CV CORONARY ANGIOGRAM;  Surgeon: Brian Long MD;  Location:  HEART CARDIAC CATH LAB     CV CORONARY ANGIOGRAM N/A 5/18/2022    Procedure: Coronary Angiogram [[id number]];  Surgeon: Khris Mcclelland MD;  Location:  HEART CARDIAC CATH LAB     CV CORONARY ANGIOGRAM N/A 5/11/2023    Procedure: Coronary Angiogram;  Surgeon: Khris Mcclelland MD;  Location:  HEART CARDIAC CATH LAB     CV CORONARY ANGIOGRAM N/A 6/7/2024    Procedure: Coronary Angiogram;  Surgeon: Kit Bacon MD;  Location:  HEART CARDIAC CATH LAB     CV HEART BIOPSY N/A 5/24/2020    Procedure: Heart Biopsy;  Surgeon: Kit Bacon MD;  Location:  HEART CARDIAC CATH LAB     CV HEART BIOPSY N/A 6/1/2020    Procedure: CV HEART BIOPSY;  Surgeon: Kit Bacon MD;  Location:  HEART CARDIAC CATH LAB     CV HEART BIOPSY N/A 6/8/2020    Procedure: CV HEART BIOPSY;  Surgeon: Kit Bacon MD;  Location:  HEART CARDIAC CATH LAB     CV HEART BIOPSY N/A 6/15/2020     Procedure: CV HEART BIOPSY;  Surgeon: Kit Bacon MD;  Location: [facility] HEART CARDIAC CATH LAB     CV HEART BIOPSY N/A 6/30/2020    Procedure: CV HEART BIOPSY;  Surgeon: Kit Bacon MD;  Location: [facility] HEART [facility]ARDIAC CATH LAB     CV HEART BIOPSY N/A 7/14/2020    Procedure: CV HEART BIOPSY;  Surgeon: Brian Long MD;  Location: [facility] HEART [facility]ARDIAC CATH LAB     CV HEART BIOPSY N/A 7/29/2020    Procedure: CV HEART BIOPSY;  Surgeon: Momo Hunt MD;  Location: [facility] HEART CARDIAC CATH LAB     CV HEART BIOPSY N/A 8/13/2020    Procedure: CV HEART BIOPSY;  Surgeon: Khris Mcclelland MD;  Location:  HEART CARDIAC CATH LAB     CV HEART BIOPSY N/A 8/26/2020    Procedure: CV HEART BIOPSY;  Surgeon: Momo Hunt MD;  Location: [facility] HEART CARDIAC CATH LAB     CV HEART BIOPSY N/A 9/30/2020    Procedure: CV HEART BIOPSY;  Surgeon: Artemio Ulola MD;  Location: [facility] HEART CARDIAC CATH LAB     CV HEART BIOPSY N/A 10/29/2020    Procedure: CV HEART BIOPSY;  Surgeon: Kit Bacon MD;  Location:  HEART CARDIAC CATH LAB     CV HEART BIOPSY N/A 1/5/2021    Procedure: CV HEART BIOPSY;  Surgeon: Carlin Garcia MD;  Location: [facility] HEART CARDIAC CATH LAB     CV HEART BIOPSY N/A 6/23/2021    Procedure: CV HEART BIOPSY;  Surgeon: Brian Long MD;  Location: [facility] HEART CARDIAC CATH LAB     CV HEART BIOPSY N/A 10/26/2021    Procedure: CV HEART BIOPSY;  Surgeon: Kit Bacon MD;  Location: [facility] HEART CARDIAC CATH LAB     CV HEART BIOPSY N/A 5/18/2022    Procedure: Heart Biopsy;  Surgeon: Khris Mcclelland MD;  Location:  HEART CARDIAC CATH LAB     CV HEART BIOPSY N/A 5/11/2023    Procedure: Heart Biopsy;  Surgeon: Khris Mcclelland MD;  Location:  HEART CARDIAC CATH LAB     CV RIGHT HEART CATH MEASUREMENTS RECORDED N/A 2/19/2019    Procedure: RHC;  Surgeon: Brian Long MD;  Location:  HEART CARDIAC CATH LAB     CV RIGHT HEART  CATH MEASUREMENTS RECORDED N/A 7/5/2019    Procedure: CV RIGHT HEART CATH;  Surgeon: Khris Mcclelland MD;  Location: [facility]ARD[facility]AC C[facility] LAB     CV RIGHT HEART CATH MEASUREMENTS RECORDED N/A 5/24/2020    Procedure: Right Heart Cath;  Surgeon: Kit Bacon MD;  Location:  HEART CARDIAC CATH LAB     CV RIGHT HEART CATH MEASUREMENTS RECORDED N/A 6/1/2020    Procedure: CV RIGHT HEART CATH;  Surgeon: Kit Bacon MD;  Location: [facility] CARDIAC CATH LAB     CV RIGHT HEART CATH MEASUREMENTS RECORDED N/A 6/8/2020    Procedure: CV RIGHT HEART CATH;  Surgeon: Kit Bacon MD;  Location:  HEART CARDIAC CATH LAB     CV RIGHT HEART CATH MEASUREMENTS RECORDED N/A 6/15/2020    Procedure: CV RIGHT HEART CATH;  Surgeon: Kit Bacon MD;  Location:  HEART CARDIAC CATH LAB     CV RIGHT HEART CATH MEASUREMENTS RECORDED N/A 6/30/2020    Procedure: CV RIGHT HEART CATH;  Surgeon: Kit Bacon MD;  Location:  HEART CARDIAC CATH LAB     CV RIGHT HEART CATH MEASUREMENTS RECORDED N/A 7/14/2020    Procedure: CV RIGHT HEART CATH;  Surgeon: Brian Long MD;  Location:  HEART CARDIAC CATH LAB     CV RIGHT HEART CATH MEASUREMENTS RECORDED N/A 7/29/2020    Procedure: CV RIGHT HEART CATH;  Surgeon: Momo Hunt MD;  Location:  HEART CARDIAC CATH LAB     CV RIGHT HEART CATH MEASUREMENTS RECORDED N/A 8/13/2020    Procedure: CV RIGHT HEART CATH;  Surgeon: Khris Mcclelland MD;  Location:  HEART CARDIAC CATH LAB     CV RIGHT HEART CATH MEASUREMENTS RECORDED N/A 8/26/2020    Procedure: CV RIGHT HEART CATH;  Surgeon: Momo Hnut MD;  Location:  HEART CARDIAC CATH LAB     CV RIGHT HEART CATH MEASUREMENTS RECORDED N/A 9/30/2020    Procedure: Right Heart Cath;  Surgeon: Artemio Ulloa MD;  Location:  HEART CARDIAC CATH LAB     CV RIGHT HEART CATH MEASUREMENTS RECORDED N/A 10/29/2020    Procedure: CV RIGHT  HEART CATH;  Surgeon: Kit Bacon MD;  Location:  HEART CARDIAC CATH LAB     CV RIGHT HEART CATH MEASUREMENTS RECORDED N/A 1/5/2021    Procedure: CV RIGHT HEART CATH;  Surgeon: Carlin Garcia MD;  Location:  HEART CARDIAC CATH LAB     CV RIGHT HEART CATH MEASUREMENTS RECORDED N/A 6/23/2021    Procedure: CV RIGHT HEART CATH;  Surgeon: Brian Long MD;  Location:  HEART CARDIAC CATH LAB     CV RIGHT HEART CATH MEASUREMENTS RECORDED N/A 10/26/2021    Procedure: CV RIGHT HEART CATH;  Surgeon: Kit Bacon MD;  Location:  HEART CARDIAC CATH LAB     CV RIGHT HEART CATH MEASUREMENTS RECORDED N/A 5/18/2022    Procedure: Right Heart Catheterization;  Surgeon: Khris Mcclelland MD;  Location:  HEART CARDIAC CATH LAB     CV RIGHT HEART CATH MEASUREMENTS RECORDED N/A 5/11/2023    Procedure: Right Heart Catheterization;  Surgeon: Khris Mcclelland MD;  Location:  HEART CARDIAC CATH LAB     CV RIGHT HEART CATH MEASUREMENTS RECORDED N/A 6/7/2024    Procedure: Right Heart Catheterization;  Surgeon: Kit Bacon MD;  Location:  HEART CARDIAC CATH LAB     ENDOBRONCHIAL ULTRASOUND FLEXIBLE N/A 9/17/2020    Procedure: BRONCHOSCOPY, flexible, endobronchial ultrasound with transbronchial biopsies, endobronchial biopsies;  Surgeon: Bill Lemus MD;  Location: [facility] OR      PRQ [id number] CORONARY ANGIOPLASTY ADDL BRANCH      PCI and ALYSSA to ostial LAD on 6/27/18     IMPLANT AUTOMATIC IMPLANTABLE CARDIOVERTER DEFIBRILLATOR       INSERT VENTRICULAR ASSIST DEVICE LEFT (HEARTMATE II) N/A 12/31/2018    Procedure: Median Sternotomy, On Cardiopulmonary Bypass Pump, Insertion of Left Ventricular Assist Device (Heartmate III);  Surgeon: Kamaljit Baker MD;  [facility]: [facility] OR     PICC DOUBLE LUMEN PLACEMENT Right 05/22/2020    5FR DL PICC inserted at right basilic vein, length 38cm.     TRANSPLANT HEART RECIPIENT AFTER HEART MATE N/A 5/18/2020    Procedure: REDO  MEDIAN STERNOTOMY, LYSIS OF ADHESIONS, ON CARDIOPULMONARY BYPASS PUMP, HEART TRANSPLANT RECIPIENT, REMOVAL OF LEFT VENTRICULAR ASSIST DEVICE, REMOVAL OF AUTOMATED IMPLANTABLE CARDIOVERTER DEFIBRILLATOR;  Surgeon: Elder Willis MD;  [facility]       Physical Exam    No exam today.    RESULTS  Creatinine   Date Value Ref Range Status   06/07/2024 1.47 (H) 0.67 - 1.17 mg/dL Final   06/23/2021 1.93 (H) 0.66 - 1.25 mg/dL Final     GFR Estimate   Date Value Ref Range Status   06/07/2024 54 (L) >60 mL/min/1.73m2 Final   06/23/2021 37 (L) >60 mL/min/[1.73_m2] Final     Comment:     Non  GFR Calc  Starting 12/18/2018, serum creatinine based estimated GFR (eGFR) will be   calculated using the Chronic Kidney Disease Epidemiology Collaboration   (CKD-EPI) equation.       Hemoglobin A1C   Date Value Ref Range Status   06/07/2024 7.3 (H) <5.7 % Final     Comment:     Normal <5.7%   Prediabetes 5.7-6.4%    Diabetes 6.5% or higher     Note: Adopted from ADA consensus guidelines.   06/23/2021 10.3 (H) 0 - 5.6 % Final     Comment:     Normal <5.7% Prediabetes 5.7-6.4%  Diabetes 6.5% or higher - adopted from ADA   consensus guidelines.       Potassium   Date Value Ref Range Status   06/07/2024 4.1 3.4 - 5.3 mmol/L Final   05/18/2022 3.5 3.4 - 5.3 mmol/L Final   06/23/2021 3.6 3.4 - 5.3 mmol/L Final     ALT   Date Value Ref Range Status   06/07/2024 28 0 - 70 U/L Final     Comment:     Reference intervals for this test were updated on 6/12/2023 to more accurately reflect our healthy population. There may be differences in the flagging of prior results with similar values performed with this method. Interpretation of those prior results can be made in the context of the updated reference intervals.     06/23/2021 26 0 - 70 U/L Final     AST   Date Value Ref Range Status   06/07/2024 30 0 - 45 U/L Final     Comment:     Reference intervals for this test were updated on 6/12/2023 to more accurately reflect  our healthy population. There may be differences in the flagging of prior results with similar values performed with this method. Interpretation of those prior results can be made in the context of the updated reference intervals.   06/23/2021 22 0 - 45 U/L Final     TSH   Date Value Ref Range Status   03/03/2021 1.87 0.40 - 4.00 mU/L Final     T4 Free   Date Value Ref Range Status   03/03/2021 1.08 0.76 - 1.46 ng/dL Final       Cholesterol   Date Value Ref Range Status   06/07/2024 178 <200 mg/dL Final   05/11/2023 153 <200 mg/dL Final   06/23/2021 168 <200 mg/dL Final   10/29/2020 161 <200 mg/dL Final     HDL Cholesterol   Date Value Ref Range Status   06/23/2021 49 >39 mg/dL Final   10/29/2020 51 >39 mg/dL Final     Direct Measure HDL   Date Value Ref Range Status   06/07/2024 45 >=40 mg/dL Final   05/11/2023 36 (L) >=40 mg/dL Final     LDL Cholesterol Calculated   Date Value Ref Range Status   06/07/2024 92 <=100 mg/dL Final   05/11/2023 61 <=100 mg/dL Final   06/23/2021 75 <100 mg/dL Final     Comment:     Desirable:       <100 mg/dl   10/29/2020 81 <100 mg/dL Final     Comment:     Desirable:       <100 mg/dl     LDL Cholesterol Direct   Date Value Ref Range Status   10/26/2021 105 (H) <100 mg/dL Final     Comment:     Age 0-19 years:  Desirable: 0-110 mg/dL   Borderline high: 110-129 mg/dL   High: >= 130 mg/dL    Age 20 years and older:  Desirable: <100mg/dL  Above desirable: 100-129 mg/dL   Borderline high: 130-159 mg/dL   High: 160-189 mg/dL   Very high: >= 190 mg/dL     Triglycerides   Date Value Ref Range Status   06/07/2024 206 (H) <150 mg/dL Final   05/11/2023 280 (H) <150 mg/dL Final   06/23/2021 220 (H) <150 mg/dL Final     Comment:     Borderline high:  150-199 mg/dl  High:             200-499 mg/dl  Very high:       >499 mg/dl     10/29/2020 144 <150 mg/dL Final       ASSESSMENT/PLAN:      1.  TYPE 2 DIABETES MELLITUS: Glycemic control has improved with addition of Ozempic.  Red may have more  weight loss and require less insulin with use of Mounjaro .  I discussed adding Mounjaro which will replace Ozempic.  Warner Cedeño [facility] will contact pt to start/titrate Mounjaro and adjust insulin as needed.  I reviewed how Mounjaro works and possible side effects of the drug including n/v, GI distress, constipation, hypoglycemia and rare risk of pancreatitis.   Pt denies hx of pancreatitis or gastroparesis. No hx of thyroid cancer.  No change in insulin doses for now.  Continue Farxiga 10 mg daily.    Reminded pt to drink plenty of water while taking Farxiga.  Again, I reviewed how Farxiga works and possible side effects including dehydration, yeast infection and possible UTI.   Reminded him to drink plenty of water.  Avoid use of Metformin due to CKD.  Continue to use Freestyle Libre2 sensor to monitor blood sugars.  Pt was seen by Oph without retinopathy per patient.  He reports mild tingling in his feet.  No foot ulcers at this time.    2.  CKD/ANDREW: Creat 1.60 with GFR 49 mL/min on 6/20243.  Avoid use of Metformin.  Pt taking Farxiga.    3.  HX OF ISCHEMIC CARDIOMYOPATHY: S/P heart transplant on 5/18/2020 and followed closely by transplant staff here.    4.  HTN: No vitals today. Continue current RXs.    5.  WEIGHT GAIN: Some weight loss with addition of Ozempic.  Reminded patient to make healthy food choices, reduce food portions with meals, try to avoid snacking and increase activity.  Will plan to switch pt to Mounjaro which will replace Ozempic. He may see additional weight loss and need less insulin with Mounjaro.  I have asked Warner CedeñoART to reach out to patient to start/titrate Mounjaro.     6  FOLLOW UP: with me in 4 months.      Time spent reviewing chart, labs and Freestyle Libre2 sensor data today = 5 minutes.  Time for video visit today = 14 minutes.  Time for documentation today = 15 minutes.    TOTAL TIME FOR VIDEO VISIT TODAY= 34 minutes.    Jeannette Schafer PA-C         Again, thank you for  allowing me to participate in the care of your patient.      Sincerely,    Jeannette Schafer PA-C

## 2024-10-03 NOTE — NURSING NOTE
Current patient location: [address] [place] [id number]    Is the patient currently in the state of [place]? YES    Visit mode:VIDEO    If the visit is dropped, the patient can be reconnected by: VIDEO VISIT: Text to cell phone:   Telephone Information:   Mobile [phone]       Will anyone else be joining the visit? NO  (If patient encounters technical issues they should call [phone]    Are changes needed to the allergy or medication list? No, Pt stated no changes to allergies, and Pt stated no med changes    Are refills needed on medications prescribed by this physician? Discuss with provider    Rooming Documentation:  Not applicable    Reason for visit: RECHECK (RETURN DIABETES )    Angelika GOMEZF

## 2024-11-03 ENCOUNTER — TELEPHONE (OUTPATIENT)
Dept: ENDOCRINOLOGY | Facility: CLINIC | Age: 64
End: 2024-11-03
Payer: COMMERCIAL

## 2024-11-03 NOTE — TELEPHONE ENCOUNTER
Left Voicemail (1st Attempt) for the patient to call back and schedule the following:    Appointment type: RETURN DIABETES  Provider: Jeannette Schafer PA-C  Return date: FEB 2025  Specialty phone number: [phone]  Additional appointment(s) needed:   Additonal Notes:

## 2024-12-12 DIAGNOSIS — Z94.1 HEART REPLACED BY TRANSPLANT (H): ICD-10-CM

## 2024-12-12 RX ORDER — AMLODIPINE BESYLATE 10 MG/1
10 TABLET ORAL DAILY
Qty: 90 TABLET | Refills: 3 | Status: SHIPPED | OUTPATIENT
Start: 2024-12-12

## 2025-01-02 ENCOUNTER — TELEPHONE (OUTPATIENT)
Dept: PHARMACY | Facility: CLINIC | Age: 65
End: 2025-01-02
Payer: COMMERCIAL

## 2025-01-02 DIAGNOSIS — E11.65 TYPE 2 DIABETES MELLITUS WITH HYPERGLYCEMIA, WITH LONG-TERM CURRENT USE OF INSULIN (H): ICD-10-CM

## 2025-01-02 DIAGNOSIS — Z79.4 TYPE 2 DIABETES MELLITUS WITH HYPERGLYCEMIA, WITH LONG-TERM CURRENT USE OF INSULIN (H): ICD-10-CM

## 2025-01-02 RX ORDER — TIRZEPATIDE 5 MG/.5ML
5 INJECTION, SOLUTION SUBCUTANEOUS
Qty: 2 ML | Refills: 5 | Status: SHIPPED | OUTPATIENT
Start: 2025-01-02

## 2025-01-06 ENCOUNTER — MYC REFILL (OUTPATIENT)
Dept: CARDIOLOGY | Facility: CLINIC | Age: 65
End: 2025-01-06
Payer: COMMERCIAL

## 2025-01-06 DIAGNOSIS — Z94.1 HEART REPLACED BY TRANSPLANT (H): ICD-10-CM

## 2025-01-07 RX ORDER — PANTOPRAZOLE SODIUM 40 MG/1
40 TABLET, DELAYED RELEASE ORAL
Qty: 90 TABLET | Refills: 3 | Status: SHIPPED | OUTPATIENT
Start: 2025-01-07

## 2025-01-12 ENCOUNTER — HEALTH MAINTENANCE LETTER (OUTPATIENT)
Age: 65
End: 2025-01-12

## 2025-02-18 DIAGNOSIS — Z94.1 STATUS POST HEART TRANSPLANTATION (H): ICD-10-CM

## 2025-02-18 DIAGNOSIS — Z79.899 ENCOUNTER FOR LONG-TERM (CURRENT) USE OF HIGH-RISK MEDICATION: Primary | ICD-10-CM

## 2025-02-18 DIAGNOSIS — Z94.1 HEART REPLACED BY TRANSPLANT (H): ICD-10-CM

## 2025-02-18 RX ORDER — EVEROLIMUS 0.5 MG/1
TABLET ORAL
Qty: 540 TABLET | Refills: 3 | Status: SHIPPED | OUTPATIENT
Start: 2025-02-18

## 2025-03-03 NOTE — PROGRESS NOTES
ASSESSMENT/PLAN:        1.  TYPE 2 DIABETES MELLITUS:   His A1c currently remains above goal on Mounjaro 5 mg weekly.  He is having some difficulty with mild hypoglycemia, and appears to have some degree of impaired awareness of hypoglycemia.  Counseling in regards to this.  Encouraged him to check fingerstick when he is not feeling symptoms of low.  Advised him to decrease his Lantus by 2 units each time he has a low overnight and contact us for further advice.  He will transition to Mounjaro 7.5 mg in 3 weeks.  He does have follow-up scheduled with Warner.  Encouraged him to continue to be physically active.  I did advise him that he does qualify for an insulin pump and then I would recommend that he consider a pump that communicates with his sensor and would suspend before low.  He is not particularly interested in the that at this time but will consider and I think we should likely continue to recommend, particular if he continues to have difficulties with low and or meeting A1c goal.     Continue Farxiga 10 mg daily.    Reminded pt to drink plenty of water while taking Farxiga.  Again, I reviewed how Farxiga works and possible side effects including dehydration, yeast infection and possible UTI.   Reminded him to drink plenty of water.  Avoid use of Metformin due to CKD.  Continue to use Freestyle Libre2 sensor to monitor blood sugars.    Diabetes complications:  He has extensive coronary artery disease and works with cardiology.  He is on rosuvastatin and aspirin as well as pantoprazole.   Pt was seen by Oph without retinopathy per patient.  He reports mild tingling in his feet.  No foot ulcers at this time.     2.  CKD/ANDREW: Creat 1.60 with GFR 49 mL/min on 6/20243.  Avoid use of Metformin.  Pt taking Farxiga.     3.  HX OF ISCHEMIC CARDIOMYOPATHY: S/P heart transplant on 5/18/2020 and followed closely by transplant staff here.     4.  HTN: No vitals today. Continue current RXs.     5.  WEIGHT GAIN:   He  has had some weight loss with GLP-1 agonists.  Encouraged in the activity he was doing today and encouraged him to continue this with heart healthy food choices.  I have asked Warner Velasquez to reach out to patient to start/titrate Mounjaro.     6  FOLLOW UP:  In 1 month with Warner Cedeño, in 3 to 6 months with myself or Leticia Schafer.  He also has not yet established .are with one of our MDs on staff and he can do this anytime within the next 12 months ideally.     It is my privilege to be involved in the care of the above patient.     Maye Neri PA-C, MPAS  Diabetes, Endocrinology, and Metabolism  [phone] Appointments/Nurse Line  [phone] Fax  [phone] office  [email or web address]                     CHANELL Moon is a 63 year old male with type 2 diabetes mellitus. Video visit today for diabetes follow up.  Pt was admitted 5/17/2020-5/29/2020 and underwent a heart transplant on 5/18/2020.  Pt's hx is significant for type 2 diabetes mellitus dx 6 + years ago, HTN, hyperlipidemia, hx of ischemic heart disease s/p STEMI with ALYSSA 2019, LVAD placement, Stage 3 CKD, and obesity.  Red was also admitted 5/12-5/15/2020 for ANDREW due to nephrolithiasis complicated by hydronephrosis/ANDREW.    He mete with Warner yesterday and noted Mounjaro not working as well, plans to transition to Mounjaro 7.5 mg weekly in 3 weeks. He still has some lows,down to 62. Some times he feels it, some times not.  If sensor alarms at 69 and he feels nothing, he waits ten minutes and if feeling low eats, if not checks BG and often is in 70s and he leaves it and it generally rises again.    Mild tingling in both feet. Denies foot ulcers/sores,concerns.  Pt denies blurred vision, n/v, SOB at rest,cough,fever,chest pain, abd pain, diarrhea, dysuria or hematuria.    Had outside A1C and was 7.9 Feb 10.   In summer does more walking and biking with his dogs and his blood sugar is better,but has more low blood sugars, last year had recurrent low  blood sugars with long bike rides and now doubles his carb ratio the meal before he will be active, such that he would now give him 1 unit per 20 g of carb before long bike rides and walks.      Diabetes therapies:  Mounjaro 5 mg weekly  Lantus 32 units daily  Humalog 1 unit per 10 g of carb plus correction with meals and snacks, approximately 70 units daily.  Gives 2 units 165 - 190. Every 25 more adds 1 more unit.  Farxiga 10 mg daily.    He uses a william 2+ sensor and has Denise plus test strips as backup  He also takes losartan 50 mg daily, amlodipine 10 mg daily for hypertension.  He also takes  Rosuvastatin 20 mg daily for dyslipidemia and cardiovascular protection.  He also does take an 81 mg aspirin daily, as well as Protonix 40 mg daily. (Please consider discontinuing aspirin!)    Diabetes Care  Retinopathy:none; pt seen by Oph in Jan 2023 with no retinopathy per patient.  Nephropathy:yes- hx of CKD/ANDREW.  Neuropathy: mild tingling in both feet.  Foot Exam:no exam today.  Taking aspirin: yes.  Lipids: LDL 61 in 5/2023.  Pt is taking Crestor.  CAD: yes; s/p heart transplant on 5/18/2020.  Depression: no.  Insulin: Basal and meal time insulin with correction scale ( 1 unit/25 for BG > 150 with meals and > 200 at bedtime).    DM meds: Farxiga and Ozempic.  Testing: Freestyle Libre2 sensor.            ROS  See under HPI.    Allergies  No Known Allergies    Medications  Current Outpatient Medications   Medication Sig Dispense Refill    ACCU-CHEK DENISE PLUS test strip Check BG 3 times daily at meals and at bedtime. 400 strip 3    acetaminophen (TYLENOL) 325 MG tablet Take 2 tablets (650 mg) by mouth every 4 hours as needed for other (multimodal surgical pain management along with NSAIDS and opioid medication as indicated based on pain control and physical function.)      amLODIPine (NORVASC) 10 MG tablet Take 1 tablet (10 mg) by mouth daily. 90 tablet 3    aspirin (ASA) 81 MG EC tablet Take 1 tablet (81 mg) by mouth  daily 30 tablet 11    BD SILVANA U/F 32G X 4 MM insulin pen needle Use 6 times daily. 600 each 3    calcium carbonate 600 mg-vitamin D 400 units (CALTRATE) 600-400 MG-UNIT per tablet Take 1 tablet by mouth 2 times daily (with meals)      Continuous Glucose Sensor (FREESTYLE DARRELL 2 PLUS SENSOR) MISC 1 each See Admin Instructions. Use as directed per 's instructions to monitor blood sugars continuously. Change sensor every 15 days. 6 each 5    Continuous Glucose Sensor (FREESTYLE DARRELL 2 SENSOR) MISC USE 1 EACH EVERY 14 DAYS USE 1 SENSOR EVERY 14 DAYS. USE TO READ BLOOD SUGARS PER MANUFACTUER'S 2 each 5    dapagliflozin (FARXIGA) 10 MG TABS tablet Take 1 tablet (10 mg) by mouth daily. 90 tablet 3    everolimus (ZORTRESS) 0.5 MG tablet TAKE 3 TABLETS BY MOUTH EVERY MORNING AND 3 TABLETS EVERY EVENING 540 tablet 3    HUMALOG KWIKPEN 100 UNIT/ML soln INJECT 1 UNIT PER EVERY 8 GRAMS OF CARBS WITH MEALS AND SNACKS, PLUS CORRECTION, APPROX 70 UNITS IN 24 HOURS. Follow up visit needed. Call  to schedule the first available visit. 75 mL 5    insulin glargine (LANTUS PEN) 100 UNIT/ML pen Inject 34 units subcutaneous at hs. May decrease by 2 units every 3 days if morning fasting sugar consistently < 100. Max daily dose = 34 units 15 mL 3    loperamide (IMODIUM) 2 MG capsule Take 1 capsule (2 mg) by mouth 4 times daily as needed for diarrhea 60 capsule 0    losartan (COZAAR) 25 MG tablet Take 2 tablets (50 mg) by mouth daily 180 tablet 3    magnesium oxide (MAG-OX) 400 (241.3 Mg) MG tablet Take 1 tablet (400 mg) by mouth daily 90 tablet 3    multivitamin w/minerals (THERA-VIT-M) tablet Take 1 tablet by mouth daily 90 tablet 3    mycophenolate (GENERIC EQUIVALENT) 250 MG capsule Take 5 capsules (1,250 mg) by mouth 2 times daily 300 capsule 11    pantoprazole (PROTONIX) 40 MG EC tablet Take 1 tablet (40 mg) by mouth every morning (before breakfast). 90 tablet 3    rosuvastatin (CRESTOR) 20 MG tablet Take 1  tablet (20 mg) by mouth daily. 90 tablet 3    Tirzepatide (MOUNJARO) 7.5 MG/0.5ML SOAJ auto-injector pen Inject 0.5 mLs (7.5 mg) subcutaneously every 7 days. 2 mL 3    torsemide (DEMADEX) 20 MG tablet Take 1 tablet (20 mg) by mouth daily 90 tablet 3       Family History  Mother and father with hx of type 2 DM.  Social History   reports that he has never smoked. He has never used smokeless tobacco. He reports that he does not drink alcohol and does not use drugs.     Past Medical History  Past Medical History:   Diagnosis Date    Acute kidney injury     CKD (chronic kidney disease)     Coronary artery disease     Diabetes mellitus (H)     HTN (hypertension)     Hyperlipidemia     ICD (implantable cardioverter-defibrillator), single chamber- NOT dependent 7/17/2018    Ischemic cardiomyopathy     LV (left ventricular) mural thrombus     Paroxysmal atrial flutter (H)     RVF (right ventricular failure) (H)     Systolic heart failure (H)     Ventricular tachycardia (H)        Past Surgical History:   Procedure Laterality Date    BRONCHOSCOPY (RIGID OR FLEXIBLE), DIAGNOSTIC N/A 9/15/2020    Procedure: BRONCHOSCOPY, WITH BRONCHOALVEOLAR LAVAGE;  Surgeon: Elder Mejia MD;  Location:  GI    BRONCHOSCOPY (RIGID OR FLEXIBLE), DIAGNOSTIC N/A 9/17/2020    Procedure: BRONCHOSCOPY, WITH BRONCHOALVEOLAR LAVAGE;  Surgeon: Bill Lemus MD;  Location:  OR    BRONCHOSCOPY RIGID OR FLEXIBLE W/TRANSENDOSCOPIC ENDOBRONCHIAL ULTRASOUND GUIDED Right 9/8/2020    Procedure: BRONCHOSCOPY, WITH ENDOBRONCHIAL ULTRASOUND;  Surgeon: Donny Mercedes MD;  Location:  GI    COLONOSCOPY N/A 12/7/2018    Procedure: COLONOSCOPY;  Surgeon: Krish Mercado MD;  Location:  OR    CV CORONARY ANGIOGRAM N/A 6/23/2021    Procedure: CV CORONARY ANGIOGRAM;  Surgeon: Brian Long MD;  Location:  HEART CARDIAC CATH LAB    CV CORONARY ANGIOGRAM N/A 5/18/2022    Procedure: Coronary Angiogram [[id number]];  Surgeon: Khris Mcclelland  MD;  Location:  HEART CARDIAC CATH LAB    CV CORONARY ANGIOGRAM N/A 5/11/2023    Procedure: Coronary Angiogram;  Surgeon: Khris Mcclelland MD;  Location:  HEART CARDIAC CATH LAB    CV CORONARY ANGIOGRAM N/A 6/7/2024    Procedure: Coronary Angiogram;  Surgeon: Kit Bacon MD;  Location:  HEART CARDIAC CATH LAB    CV HEART BIOPSY N/A 5/24/2020    Procedure: Heart Biopsy;  Surgeon: Kit Bacon MD;  Location: [facility] HE[facility]ARD[facility]AC CATH LAB    CV HEART BIOPSY N/A 6/1/2020    Procedure: CV HEART BIOPSY;  Surgeon: Kit Bacon MD;  Location:  HEART CARDIAC CATH LAB    CV HEART BIOPSY N/A 6/8/2020    Procedure: CV HEART BIOPSY;  Surgeon: Kit Bacon MD;  Location:  HEART CARDIAC CATH LAB    CV HEART BIOPSY N/A 6/15/2020    Procedure: CV HEART BIOPSY;  Surgeon: Kit Bacon MD;  Location:  HEART CARDIAC CATH LAB    CV HEART BIOPSY N/A 6/30/2020    Procedure: CV HEART BIOPSY;  Surgeon: Kit Bacon MD;  Location:  HEART CARDIAC CATH LAB    CV HEART BIOPSY N/A 7/14/2020    Procedure: CV HEART BIOPSY;  Surgeon: Brian Long MD;  Location:  HEART CARDIAC CATH LAB    CV HEART BIOPSY N/A 7/29/2020    Procedure: CV HEART BIOPSY;  Surgeon: Momo Hunt MD;  Location:  HEART CARDIAC CATH LAB    CV HEART BIOPSY N/A 8/13/2020    Procedure: CV HEART BIOPSY;  Surgeon: Khris Mcclelland MD;  Location:  HEART CARDIAC CATH LAB    CV HEART BIOPSY N/A 8/26/2020    Procedure: CV HEART BIOPSY;  Surgeon: Momo Hunt MD;  Location:  HEART CARDIAC CATH LAB    CV HEART BIOPSY N/A 9/30/2020    Procedure: CV HEART BIOPSY;  Surgeon: Artemio Ulloa MD;  Location: [facility] HE[facility]ARD[facility] CATH LAB    CV HEART BIOPSY N/A 10/29/2020    Procedure: CV HEART BIOPSY;  Surgeon: Kit Bacon MD;  Location:  HEART CARDIAC CATH LAB    CV HEART BIOPSY N/A 1/5/2021    Procedure: CV HEART  BIOPSY;  Surgeon: Carlin Garcia MD;  [facility]    CV HEART BIOPSY N/A 6/23/2021    Procedure: CV HEART BIOPSY;  Surgeon: Brian Long MD;  [facility]    CV HEART BIOPSY N/A 10/26/2021    Procedure: CV HEART BIOPSY;  Surgeon: Kit Bacon MD;  Location: [facility] LAB    CV HEART BIOPSY N/A 5/18/2022    Procedure: Heart Biopsy;  Surgeon: Khris Mcclelland MD;  [facility]    CV HEART BIOPSY N/A 5/11/2023    Procedure: Heart Biopsy;  Surgeon: Khris Mcclelland MD;  Location:  HEART CARDIAC CATH LAB    CV RIGHT HEART CATH MEASUREMENTS RECORDED N/A 2/19/2019    Procedure: RHC;  Surgeon: Brian Long MD;  Location:  HEART CARDIAC CATH LAB    CV RIGHT HEART CATH MEASUREMENTS RECORDED N/A 7/5/2019    Procedure: CV RIGHT HEART CATH;  Surgeon: Khris Mcclelland MD;  Location:  HEART CARDIAC CATH LAB    CV RIGHT HEART CATH MEASUREMENTS RECORDED N/A 5/24/2020    Procedure: Right Heart Cath;  Surgeon: Kit Bacon MD;  Location:  HEART CARDIAC CATH LAB    CV RIGHT HEART CATH MEASUREMENTS RECORDED N/A 6/1/2020    Procedure: CV RIGHT HEART CATH;  Surgeon: Kit Bacon MD;  Location:  HEART CARDIAC CATH LAB    CV RIGHT HEART CATH MEASUREMENTS RECORDED N/A 6/8/2020    Procedure: CV RIGHT HEART CATH;  Surgeon: Kit Bacon MD;  Location:  HEART CARDIAC CATH LAB    CV RIGHT HEART CATH MEASUREMENTS RECORDED N/A 6/15/2020    Procedure: CV RIGHT HEART CATH;  Surgeon: Kit Bacon MD;  Location:  HEART CARDIAC CATH LAB    CV RIGHT HEART CATH MEASUREMENTS RECORDED N/A 6/30/2020    Procedure: CV RIGHT HEART CATH;  Surgeon: Kit Bacon MD;  Location:  HEART CARDIAC CATH LAB    CV RIGHT HEART CATH MEASUREMENTS RECORDED N/A 7/14/2020    Procedure: CV RIGHT HEART CATH;  Surgeon: Brian Long MD;   Location:  HEART CARDIAC CATH LAB    CV RIGHT HEART CATH MEASUREMENTS RECORDED N/A 7/29/2020    Procedure: CV RIGHT HEART CATH;  Surgeon: Momo Hunt MD;  Location:  HEART CARDIAC CATH LAB    CV RIGHT HEART CATH MEASUREMENTS RECORDED N/A 8/13/2020    Procedure: CV RIGHT HEART CATH;  Surgeon: Khris Mcclelland MD;  Location:  HEART CARDIAC CATH LAB    CV RIGHT HEART CATH MEASUREMENTS RECORDED N/A 8/26/2020    Procedure: CV RIGHT HEART CATH;  Surgeon: Momo Hunt MD;  Location:  HEART CARDIAC CATH LAB    CV RIGHT HEART CATH MEASUREMENTS RECORDED N/A 9/30/2020    Procedure: Right Heart Cath;  Surgeon: Artemio Ulloa MD;  Location:  HEART CARDIAC CATH LAB    CV RIGHT HEART CATH MEASUREMENTS RECORDED N/A 10/29/2020    Procedure: CV RIGHT HEART CATH;  Surgeon: Kit Bacon MD;  Location:  HEART CARDIAC CATH LAB    CV RIGHT HEART CATH MEASUREMENTS RECORDED N/A 1/5/2021    Procedure: CV RIGHT HEART CATH;  Surgeon: Carlin Garcia MD;  Location:  HEART CARDIAC CATH LAB    CV RIGHT HEART CATH MEASUREMENTS RECORDED N/A 6/23/2021    Procedure: CV RIGHT HEART CATH;  Surgeon: Brian Long MD;  Location:  HEART CARDIAC CATH LAB    CV RIGHT HEART CATH MEASUREMENTS RECORDED N/A 10/26/2021    Procedure: CV RIGHT HEART CATH;  Surgeon: Kit Bacon MD;  Location:  HEART CARDIAC CATH LAB    CV RIGHT HEART CATH MEASUREMENTS RECORDED N/A 5/18/2022    Procedure: Right Heart Catheterization;  Surgeon: Khris Mcclelland MD;  Location:  HEART CARDIAC CATH LAB    CV RIGHT HEART CATH MEASUREMENTS RECORDED N/A 5/11/2023    Procedure: Right Heart Catheterization;  Surgeon: Khris Mcclelland MD;  Location:  HEART CARDIAC CATH LAB    CV RIGHT HEART CATH MEASUREMENTS RECORDED N/A 6/7/2024    Procedure: Right Heart Catheterization;  Surgeon: Kit Bacon MD;  Location:  HEART CARDIAC CATH LAB    ENDOBRONCHIAL ULTRASOUND FLEXIBLE  "N/A 9/17/2020    Procedure: BRONCHOSCOPY, flexible, endobronchial ultrasound with transbronchial biopsies, endobronchial biopsies;  Surgeon: Bill Lemus MD;  [facility]    HC PRQ [id number] CORONARY ANGIOPLASTY ADDL BRANCH      PCI and ALYSSA to ostial LAD on 6/27/18    IMPLANT AUTOMATIC IMPLANTABLE CARDIOVERTER DEFIBRILLATOR      INSERT VENTRICULAR ASSIST DEVICE LEFT (HEARTMATE II) N/A 12/31/2018    Procedure: Median Sternotomy, On Cardiopulmonary Bypass Pump, Insertion of Left Ventricular Assist Device (Heartmate III);  Surgeon: Kamaljit Baker MD;  [facility]    PICC DOUBLE LUMEN PLACEMENT Right 05/22/2020    5FR DL PICC inserted at right basilic vein, length 38cm.    TRANSPLANT HEART RECIPIENT AFTER HEART MATE N/A 5/18/2020    Procedure: REDO MEDIAN STERNOTOMY, LYSIS OF ADHESIONS, ON CARDIOPULMONARY BYPASS PUMP, HEART TRANSPLANT RECIPIENT, REMOVAL OF LEFT VENTRICULAR ASSIST DEVICE, REMOVAL OF AUTOMATED IMPLANTABLE CARDIOVERTER DEFIBRILLATOR;  Surgeon: Elder Willis MD;  [facility]       Physical Exam  Wt Readings from Last 10 Encounters:   03/11/25 83.9 kg (185 lb)   06/07/24 87.6 kg (193 lb 1.6 oz)   06/07/24 89 kg (196 lb 4.8 oz)   05/11/23 87.5 kg (193 lb)   05/11/23 87.5 kg (193 lb)   02/15/23 85.7 kg (188 lb 14.4 oz)   05/18/22 91.6 kg (202 lb)   02/23/22 91.9 kg (202 lb 8 oz)   01/18/22 91.2 kg (201 lb)   10/26/21 85.3 kg (188 lb)     Estimated body mass index is 31.76 kg/m  as calculated from the following:    Height as of this encounter: 1.626 m (5' 4\").    Weight as of this encounter: 83.9 kg (185 lb).    Pleasant articulate overweight male in no acute distress who walks through his home throughout our visit.  Voice and speech are clear and articulate.  Neck is supple no JVD or masses are appreciated.  No cough sneeze or dyspnea appreciated.    RESULTS    Recent Labs   Lab Test 06/07/24  0837 05/11/23  0802 06/23/21  0654 03/03/21  1152 07/05/19  0932 06/26/19  0925 07/24/18  0604 " 07/23/18  0645   A1C 7.3* 7.4*   < >  --    < >  --    < >  --    TSH  --   --   --  1.87  --  3.94  --  6.91*   T4  --   --   --  1.08  --   --   --  1.05   LDL 92 61   < >  --    < >  --    < >  --    HDL 45 36*   < >  --    < >  --    < >  --    TRIG 206* 280*   < >  --    < >  --    < >  --    CR 1.47* 1.75*   < >  --    < > 1.64*   < > 1.74*    < > = values in this interval not displayed.     Had outside A1C and was 7.9       35 minutes spent on the date of the encounter doing chart review, history and exam, documentation, education and counseling, as well as communication and coordination of care, and further activities as noted above.   This time includes time spent reviewing CGM, reviewing and ordering lab tests, medications and supplies.      It is my privilege to be involved in the care of the above patient.     Maye Neri PA-C, MPAS  Diabetes, Endocrinology, and Metabolism  [phone] Appointments/Nurse Line  [phone] Fax  [phone] office  [email or web address]         Virtual Visit Details   Joined the call at 3/11/2025, 8:40:52 am.  Left the call at 3/11/2025, 8:56:22 am.  You were on the call for 15 minutes 29 seconds .   Type of service:  Video Visit   Video Start Time: 8:40 AM  Video End Time:8:56 AM    Originating Location (pt. Location): Home    Distant Location (provider location):  Off-site  Platform used for Video Visit: Lindsey          Outcome for 03/07/25 10:07 AM: Data obtained via [employer] website  Dorothy Villeda MA    Patient is showing 5/5 MNCM met.   Dorothy Villeda MA

## 2025-03-04 ENCOUNTER — TELEPHONE (OUTPATIENT)
Dept: PHARMACY | Facility: CLINIC | Age: 65
End: 2025-03-04
Payer: COMMERCIAL

## 2025-03-04 DIAGNOSIS — Z79.4 TYPE 2 DIABETES MELLITUS WITH HYPERGLYCEMIA, WITH LONG-TERM CURRENT USE OF INSULIN (H): Primary | ICD-10-CM

## 2025-03-04 DIAGNOSIS — E11.65 TYPE 2 DIABETES MELLITUS WITH HYPERGLYCEMIA, WITH LONG-TERM CURRENT USE OF INSULIN (H): Primary | ICD-10-CM

## 2025-03-04 RX ORDER — BLOOD-GLUCOSE SENSOR
1 EACH MISCELLANEOUS SEE ADMIN INSTRUCTIONS
Qty: 6 EACH | Refills: 5 | Status: SHIPPED | OUTPATIENT
Start: 2025-03-04

## 2025-03-04 NOTE — TELEPHONE ENCOUNTER
Order sent and sent patient Mychart.    Warner Cedeño, PharmD, [facility]  Endocrine & Diabetes MTM Pharmacist  [facility]  Diabetes & Endocrinology Clinic  [address] [place] [id number]    Contact information:   To schedule a MTM appointment: [phone]  For questions or concerns, please send a TouchPo Android POShart message (preferred) or call the clinic at [phone].    For more urgent concerns that do not require [phone], please call [phone] after hours/weekends and ask to speak with the Endocrinologist on call.

## 2025-03-04 NOTE — TELEPHONE ENCOUNTER
Good melisa Hylton,    The patient called this morning and is requesting a prescription for the Freestyle Osiris 2 sensor be sent to the [facility] pharmacy in [place] on [address]. Please advise.     Thank you,  Angelika Luong [facility]

## 2025-03-10 ENCOUNTER — VIRTUAL VISIT (OUTPATIENT)
Dept: PHARMACY | Facility: CLINIC | Age: 65
End: 2025-03-10
Attending: PHYSICIAN ASSISTANT
Payer: COMMERCIAL

## 2025-03-10 DIAGNOSIS — Z79.4 TYPE 2 DIABETES MELLITUS WITH HYPERGLYCEMIA, WITH LONG-TERM CURRENT USE OF INSULIN (H): Primary | ICD-10-CM

## 2025-03-10 DIAGNOSIS — Z94.1 STATUS POST HEART TRANSPLANTATION (H): ICD-10-CM

## 2025-03-10 DIAGNOSIS — E11.65 TYPE 2 DIABETES MELLITUS WITH HYPERGLYCEMIA, WITH LONG-TERM CURRENT USE OF INSULIN (H): Primary | ICD-10-CM

## 2025-03-10 RX ORDER — PEN NEEDLE, DIABETIC 32GX 5/32"
NEEDLE, DISPOSABLE MISCELLANEOUS
Qty: 600 EACH | Refills: 3 | Status: SHIPPED | OUTPATIENT
Start: 2025-03-10

## 2025-03-10 RX ORDER — INSULIN LISPRO 100 [IU]/ML
INJECTION, SOLUTION INTRAVENOUS; SUBCUTANEOUS
Qty: 75 ML | Refills: 5 | Status: SHIPPED | OUTPATIENT
Start: 2025-03-10

## 2025-03-10 RX ORDER — TIRZEPATIDE 7.5 MG/.5ML
7.5 INJECTION, SOLUTION SUBCUTANEOUS
Qty: 2 ML | Refills: 3 | Status: SHIPPED | OUTPATIENT
Start: 2025-03-10

## 2025-03-10 RX ORDER — DAPAGLIFLOZIN 10 MG/1
10 TABLET, FILM COATED ORAL DAILY
Qty: 90 TABLET | Refills: 3 | Status: SHIPPED | OUTPATIENT
Start: 2025-03-10

## 2025-03-10 NOTE — PROGRESS NOTES
Medication Therapy Management (MTM) Encounter    ASSESSMENT:                            Medication Adherence/Access: See below for considerations    Type 2 Diabetes:   A1C above goal of < 7%, nearing time in range goal of > 70% since upgrading Ozempic to Mounjaro last year. Given tolerating well, would benefit from dose increase today to help achieve glycemic goals and ideally reduce insulin need. Reasonable to relax insulin program to mitigate risk of hypoglycemia in anticipation ofMounjaro dose increase.     Due to time constraints, I was only able to assess the above with the patient today. Will follow-up on other disease states in future encounters    PLAN:                            When completed with the 5 mg doses, increase Mounjaro to 7.5 mg weekly    When you make the Mounjaro dose change, please decrease Lantus to 30 units daily and continue to decrease by 2 units every 3 days if FPG consistently < 100    When you make the Mounjaro dose change, consider weakening ICR 1:9 and 1:18 if starting to exercise heavily    Please let me know if you have any low blood sugars < 70    Endocrine Team & Next Follow-Up:  3/10/2025 with Maye Neri PA-C   4/9/2025 with Warner     SUBJECTIVE/OBJECTIVE:                          Red Sharp is a 64 year old male seen for a follow up visit with MTM pharmacist referred by Dr. Schafer.     Reason for visit: MTM    Allergies/ADRs: Reviewed in chart  Past Medical History: Reviewed in chart  Tobacco: He reports that he has never smoked. He has never used smokeless tobacco.    Medication Adherence/Access: no issues reported    Diabetes   Farxiga 10 mg tablets take once daily   Lantus 34 units at bedtime  Humalog Kwikpen 1 unit for every 10 g of carbs with meals and snacks, plus correction (about 70 units per day). Usually 8-10 units with meals. Misses 2-3 times per week due to busy schedule   Mounjaro 5 mg weekly (came from Ozempic)    No side effects/concerns, other than  expressing interest to go on the Mounjaro dose to help improve glycemic control.     Blood sugar monitoring: Continuous Glucose Monitor - Osiris             Lab Results   Component Value Date    A1C 7.3 (H) 06/07/2024    A1C 7.4 (H) 05/11/2023    A1C  02/15/2023      Comment:      The previously reported result may be inaccurate due to a laboratory instrument calibration issue. Providers should order a new test to be performed if clinically indicated.  [facility] EHR will issue a credit for this test.  This is a corrected result. Previous result was 9.6 % on 2/15/2023 at  5:30 PM CST        ----------------      I spent 15 minutes with this patient today. All changes were made via collaborative practice agreement with Leticia Schafer PA-C. A copy of the visit note was provided to the patient's provider(s).    A summary of these recommendations was given to the patient.    Warner Cedeño, PharmD, [facility]  Endocrine & Diabetes [facility] Pharmacist  [address] [place] [id number]    Telemedicine Visit Details  Type of service:  Video Conference via [employer]  Start Time:  1PM  End Time:  1:15PM     Medication Therapy Recommendations  Type 2 diabetes mellitus with hyperglycemia (H)   1 Current Medication: MOUNJARO 5 MG/0.5ML SOAJ (Discontinued)   Current Medication Sig: Inject 0.5 mLs (5 mg) subcutaneously every 7 days.   Rationale: Dose too low - Dosage too low - Effectiveness   Recommendation: Increase Dose   Status: Accepted per CPA   Identified Date: 3/10/2025 Completed Date: 3/10/2025

## 2025-03-10 NOTE — PATIENT INSTRUCTIONS
When completed with the 5 mg doses, increase Mounjaro to 7.5 mg weekly    When you make the Mounjaro dose change, please decrease Lantus to 30 units daily and continue to decrease by 2 units every 3 days if FPG consistently < 100    When you make the Mounjaro dose change, consider weakening ICR 1:9 and 1:18 if starting to exercise heavily    Please let me know if you have any low blood sugars < 70    Endocrine Team & Next Follow-Up:  3/10/2025 with Maye Neri PA-C   4/9/2025 with Warner

## 2025-03-11 ENCOUNTER — VIRTUAL VISIT (OUTPATIENT)
Dept: ENDOCRINOLOGY | Facility: CLINIC | Age: 65
End: 2025-03-11
Payer: COMMERCIAL

## 2025-03-11 VITALS — BODY MASS INDEX: 31.58 KG/M2 | WEIGHT: 185 LBS | HEIGHT: 64 IN

## 2025-03-11 DIAGNOSIS — E11.65 TYPE 2 DIABETES MELLITUS WITH HYPERGLYCEMIA, WITH LONG-TERM CURRENT USE OF INSULIN (H): Primary | ICD-10-CM

## 2025-03-11 DIAGNOSIS — Z79.4 TYPE 2 DIABETES MELLITUS WITH HYPERGLYCEMIA, WITH LONG-TERM CURRENT USE OF INSULIN (H): Primary | ICD-10-CM

## 2025-03-11 DIAGNOSIS — E11.649 HYPOGLYCEMIA UNAWARENESS DUE TO TYPE 2 DIABETES MELLITUS (H): ICD-10-CM

## 2025-03-11 PROCEDURE — 1126F AMNT PAIN NOTED NONE PRSNT: CPT | Mod: 95 | Performed by: PHYSICIAN ASSISTANT

## 2025-03-11 PROCEDURE — 98006 SYNCH AUDIO-VIDEO EST MOD 30: CPT | Performed by: PHYSICIAN ASSISTANT

## 2025-03-11 ASSESSMENT — PAIN SCALES - GENERAL: PAINLEVEL_OUTOF10: NO PAIN (0)

## 2025-03-11 NOTE — PATIENT INSTRUCTIONS
Dear Red,  Nice to meet you!    Keep up your work to stay active and if you have any further low blood sugars please decrease Lantus to 30 units daily and let Warner and I know so we can look at further insulin dose decreases.    My best wishes,    Maye Neri PA-C, MPAS  [facility]  Diabetes, Endocrinology, and Metabolism  [phone] Appointments/Nurse  [phone] Fax    It is good to speak with you today!  I am here to support your health care and life goals by helping you to manage your diabetes and prevent complications.  Please let me know of anything more I might do to support this and review below to be aware of recommendations and resources that might apply to you and your situation.  With deep appreciation and my best wishes for great health,  Maye      Please contact us to schedule at any of our [place] lab locations  Call [phone] ([phone]), select option 1.    For [facility] at [address] [place] [address], call : [phone].    Please be sure to get your eye exam done annually as well as microalbuminuria test at lab if you do not see a nephrologist.      Our diabetic foot providers, Dr. Alvarez and Nisha are available to see patients with pedal ulcers or other concerns.   Please be sure to let us know about any lesions that are not healing readily or other concerns with your feet!    If you are smoking, I do recommend that you quit or, if unable, reduce you use and help is available!  Our clinical pharmacists can work with you to develop a quit or reduction plan that may include nicotine replacement if desired and offer the support and knowledge to help you quit smoking.  Call to schedule or let me know if you would like a referral!    Please work to meet recommendations for physical activity which is 30 minutes aerobic activity at 6 days each week and resistance or weight training 2-3 times /week.  If you are >60 years old, please be sure you are including  exercises to help you maintain good balance and prevent falls as you continue to age.  Please advise if you you would like a referral to physical therapy to assist you in developing a personalized and appropriate program for yourself.     If you are on insulin and have ever had any low blood sugars that you are unable to treat your self we need a plan to prevent this, but you also MUST ALSO HAVE a prescription for GLUCAGON, nasal inhaled or injectable.  Please let me know right away, if you need this refilled.      If you are on a pump, please let me know if you do not have a backup insulin plan in case of pump failure, including a prescription for long-acting insulin, and knowledge of what dose you should use in case of pump failure.    Also many heart healthy food ideas are available at:  https://www.diabetesfoodhub.org/    If you do not already have a primary care provider it is very important that you do make an appointment to establish care with a family medicine, internal medicine, adult medicine, or med/peds primary care provider.  To establish primary care at [facility] with an Internal Medicine Physician, please call [phone] and ask for Internal Medicine/Primary Care clinic appointment.    Diabetes Education and Nutrition providers are also available to support you in matching your lifestyle and health goals as well as keep up with technology that can help your blood sugar management less burdensome and time consuming while getting better results!  Please let us know any time you would like to schedule with nutrition or diabetes educator; most insurers cover 2 - 6 hours of education annually and I see better outcomes in patients who take advantage of this.  Knowledge is power of course!    Blood pressure management is a VERY IMPORTANT part of diabetes management.  If you are having higher blood pressures in clinic >140/90, PLEASE GET A HOME BLOOD PRESSURE cuff and check your blood sugar after  sitting for 5 minutes.  If it is >140/90 seated resting at home it is imperative that you let me, your general practitioner or nephrologist know so that we can make a plan to address this.    Did you know that persons with diabetes have significantly increased rates of both depression and anxiety?  This can also increase your risk for cardiovascular disease and is treatable and manageable!  Health Psychology is a specialty that helps people cope with the stress and anxiety that often occurs with illness, emotional and psychological issues, and preparation for medical treatment including surgical procedures.  We have a number of great providers here at [facility] [place] [place], including Dr. Maria Fernanda Caro who specializes in health psychology and behavioral medicine with clinical expertise in stress-related and inflammatory diseases, diabetes, obesity, and addressing health inequities. Information to schedule with her is below or please feel free to discuss your concerns with me at your next appointment.    If you are having difficulty affording enough food, our SW Sangeetha Adams can help you access "[employer] which gives you $80 /month  food credit at certain locations including [employer], which everyone can access.  Groceries at 40% less than in stores.  [email or web address]-[email or web address]      HEALTH PSYCHOLOGY    What is Health Psychology?  Health Psychology is a specialty that helps people cope with the stress and anxiety that often occurs with illness, emotional and psychological issues, and preparation for medical treatment including surgical procedures.    Telehealth services are now being provided to patients.    Location:    [facility]                       [address] [place] [id number]                     Please arrive 15 minutes early to check in for in-person appointments.       We can help patients affected by  Adjustment problems  Anxiety  Cancer  Cardiovascular  disorders  Chronic digestive disorders  Depression  Diabetes  Disability  Health-related behavior change  Insomnia  Other medical and nervous system conditions  People experiencing or wishing to prevent health problems  Pulmonary/lung conditions  Smoking cessation  Transplants    Treatments we offer include  Psychological assessment  Psychotherapy/counseling  Cognitive behavioral therapy  Relaxation training  Behavior therapy  Motivational interviewing  Stress management    How We Help  Coping: We help people with the emotional issues related to their illness.  Challenges: Difficult challenges seem to increase during illness. We teach steps and strategies for managing stressful situations.  Feelings: We help people deal with anger, anxiety, confusion, depression, fear, frustration, grief, loss of control, sadness, uncertainty, and motivational issues.  Behaviors: When people are ill, it can be harder to take care of themselves. We help people manage weight, follow health regimens, relax, and quit smoking.  Counseling: We offer several types of counseling and can create a plan that meets needs of a patient. Our practice works with individuals, couples, and families.    Our Care Team  Working with primary and specialty physicians at [facility] and [facility], we see patients in the hospital and clinic, allowing us to coordinate our services with the rest of people's medical needs.       To Schedule an Appointment  New patient appointments are generally scheduled through the Central Scheduling number: [phone].        NOTE: Services are confidential. Insurance coverage varies. Mental health benefits of health plans may have different levels of coverage than medical benefits. Please confirm the coverage details with the insurance plan or our business office.      My best wishes,    Maye Neri PA-C, MPAS  [facility]  Diabetes,  Endocrinology, and Metabolism  [phone] Appointments/Nurse  [phone] Fax

## 2025-03-11 NOTE — LETTER
3/11/2025       RE: Mariusz Sharp  [address] [facility] [address]     Dear Colleague,    Thank you for referring your patient, Mariusz Sharp, to the [facility] ENDOCRINOLOGY CLINIC [place] at [facility]. Please see a copy of my visit note below.        ASSESSMENT/PLAN:        1.  TYPE 2 DIABETES MELLITUS:   His A1c currently remains above goal on Mounjaro 5 mg weekly.  He is having some difficulty with mild hypoglycemia, and appears to have some degree of impaired awareness of hypoglycemia.  Counseling in regards to this.  Encouraged him to check fingerstick when he is not feeling symptoms of low.  Advised him to decrease his Lantus by 2 units each time he has a low overnight and contact us for further advice.  He will transition to Mounjaro 7.5 mg in 3 weeks.  He does have follow-up scheduled with Warner.  Encouraged him to continue to be physically active.  I did advise him that he does qualify for an insulin pump and then I would recommend that he consider a pump that communicates with his sensor and would suspend before low.  He is not particularly interested in the that at this time but will consider and I think we should likely continue to recommend, particular if he continues to have difficulties with low and or meeting A1c goal.     Continue Farxiga 10 mg daily.    Reminded pt to drink plenty of water while taking Farxiga.  Again, I reviewed how Farxiga works and possible side effects including dehydration, yeast infection and possible UTI.   Reminded him to drink plenty of water.  Avoid use of Metformin due to CKD.  Continue to use Freestyle Libre2 sensor to monitor blood sugars.    Diabetes complications:  He has extensive coronary artery disease and works with cardiology.  He is on rosuvastatin and aspirin as well as pantoprazole.   Pt was seen by Oph without retinopathy per patient.  He reports mild tingling in his feet.  No foot ulcers at  this time.     2.  CKD/ANDREW: Creat 1.60 with GFR 49 mL/min on 6/20243.  Avoid use of Metformin.  Pt taking Farxiga.     3.  HX OF ISCHEMIC CARDIOMYOPATHY: S/P heart transplant on 5/18/2020 and followed closely by transplant staff here.     4.  HTN: No vitals today. Continue current RXs.     5.  WEIGHT GAIN:   He has had some weight loss with GLP-1 agonists.  Encouraged in the activity he was doing today and encouraged him to continue this with heart healthy food choices.  I have asked Warner Velasquez to reach out to patient to start/titrate Mounjaro.     6  FOLLOW UP:  In 1 month with Warner Cedeño, in 3 to 6 months with myself or Leticia Schafer.  He also has not yet established .are with one of our MDs on staff and he can do this anytime within the next 12 months ideally.     It is my privilege to be involved in the care of the above patient.     Maye Neri PA-C, MPAS  Diabetes, Endocrinology, and Metabolism  [phone] Appointments/Nurse Line  [phone] Fax  [phone] office  [email or web address]                     CHANELL Moon is a 63 year old male with type 2 diabetes mellitus. Video visit today for diabetes follow up.  Pt was admitted 5/17/2020-5/29/2020 and underwent a heart transplant on 5/18/2020.  Pt's hx is significant for type 2 diabetes mellitus dx 6 + years ago, HTN, hyperlipidemia, hx of ischemic heart disease s/p STEMI with ALYSSA 2019, LVAD placement, Stage 3 CKD, and obesity.  Red was also admitted 5/12-5/15/2020 for ANDREW due to nephrolithiasis complicated by hydronephrosis/ANDREW.    He mete with Warner yesterday and noted Mounjaro not working as well, plans to transition to Mounjaro 7.5 mg weekly in 3 weeks. He still has some lows,down to 62. Some times he feels it, some times not.  If sensor alarms at 69 and he feels nothing, he waits ten minutes and if feeling low eats, if not checks BG and often is in 70s and he leaves it and it generally rises again.    Mild tingling in both feet. Denies foot  ulcers/sores,concerns.  Pt denies blurred vision, n/v, SOB at rest,cough,fever,chest pain, abd pain, diarrhea, dysuria or hematuria.    Had outside A1C and was 7.9 Feb 10.   In summer does more walking and biking with his dogs and his blood sugar is better,but has more low blood sugars, last year had recurrent low blood sugars with long bike rides and now doubles his carb ratio the meal before he will be active, such that he would now give him 1 unit per 20 g of carb before long bike rides and walks.      Diabetes therapies:  Mounjaro 5 mg weekly  Lantus 32 units daily  Humalog 1 unit per 10 g of carb plus correction with meals and snacks, approximately 70 units daily.  Gives 2 units 165 - 190. Every 25 more adds 1 more unit.  Farxiga 10 mg daily.    He uses a william 2+ sensor and has Denise plus test strips as backup  He also takes losartan 50 mg daily, amlodipine 10 mg daily for hypertension.  He also takes  Rosuvastatin 20 mg daily for dyslipidemia and cardiovascular protection.  He also does take an 81 mg aspirin daily, as well as Protonix 40 mg daily. (Please consider discontinuing aspirin!)    Diabetes Care  Retinopathy:none; pt seen by Oph in Jan 2023 with no retinopathy per patient.  Nephropathy:yes- hx of CKD/ANDREW.  Neuropathy: mild tingling in both feet.  Foot Exam:no exam today.  Taking aspirin: yes.  Lipids: LDL 61 in 5/2023.  Pt is taking Crestor.  CAD: yes; s/p heart transplant on 5/18/2020.  Depression: no.  Insulin: Basal and meal time insulin with correction scale ( 1 unit/25 for BG > 150 with meals and > 200 at bedtime).    DM meds: Farxiga and Ozempic.  Testing: Freestyle Libre2 sensor.            ROS  See under HPI.    Allergies  No Known Allergies    Medications  Current Outpatient Medications   Medication Sig Dispense Refill     ACCU-CHEK DENISE PLUS test strip Check BG 3 times daily at meals and at bedtime. 400 strip 3     acetaminophen (TYLENOL) 325 MG tablet Take 2 tablets (650 mg) by mouth  every 4 hours as needed for other (multimodal surgical pain management along with NSAIDS and opioid medication as indicated based on pain control and physical function.)       amLODIPine (NORVASC) 10 MG tablet Take 1 tablet (10 mg) by mouth daily. 90 tablet 3     aspirin (ASA) 81 MG EC tablet Take 1 tablet (81 mg) by mouth daily 30 tablet 11     BD SILVANA U/F 32G X 4 MM insulin pen needle Use 6 times daily. 600 each 3     calcium carbonate 600 mg-vitamin D 400 units (CALTRATE) 600-400 MG-UNIT per tablet Take 1 tablet by mouth 2 times daily (with meals)       Continuous Glucose Sensor (FREESTYLE DARRELL 2 PLUS SENSOR) MISC 1 each See Admin Instructions. Use as directed per 's instructions to monitor blood sugars continuously. Change sensor every 15 days. 6 each 5     Continuous Glucose Sensor (FREESTYLE DARRELL 2 SENSOR) MISC USE 1 EACH EVERY 14 DAYS USE 1 SENSOR EVERY 14 DAYS. USE TO READ BLOOD SUGARS PER MANUFACTUER'S 2 each 5     dapagliflozin (FARXIGA) 10 MG TABS tablet Take 1 tablet (10 mg) by mouth daily. 90 tablet 3     everolimus (ZORTRESS) 0.5 MG tablet TAKE 3 TABLETS BY MOUTH EVERY MORNING AND 3 TABLETS EVERY EVENING 540 tablet 3     HUMALOG KWIKPEN 100 UNIT/ML soln INJECT 1 UNIT PER EVERY 8 GRAMS OF CARBS WITH MEALS AND SNACKS, PLUS CORRECTION, APPROX 70 UNITS IN 24 HOURS. Follow up visit needed. Call  to schedule the first available visit. 75 mL 5     insulin glargine (LANTUS PEN) 100 UNIT/ML pen Inject 34 units subcutaneous at hs. May decrease by 2 units every 3 days if morning fasting sugar consistently < 100. Max daily dose = 34 units 15 mL 3     loperamide (IMODIUM) 2 MG capsule Take 1 capsule (2 mg) by mouth 4 times daily as needed for diarrhea 60 capsule 0     losartan (COZAAR) 25 MG tablet Take 2 tablets (50 mg) by mouth daily 180 tablet 3     magnesium oxide (MAG-OX) 400 (241.3 Mg) MG tablet Take 1 tablet (400 mg) by mouth daily 90 tablet 3     multivitamin w/minerals  (THERA-VIT-M) tablet Take 1 tablet by mouth daily 90 tablet 3     mycophenolate (GENERIC EQUIVALENT) 250 MG capsule Take 5 capsules (1,250 mg) by mouth 2 times daily 300 capsule 11     pantoprazole (PROTONIX) 40 MG EC tablet Take 1 tablet (40 mg) by mouth every morning (before breakfast). 90 tablet 3     rosuvastatin (CRESTOR) 20 MG tablet Take 1 tablet (20 mg) by mouth daily. 90 tablet 3     Tirzepatide (MOUNJARO) 7.5 MG/0.5ML SOAJ auto-injector pen Inject 0.5 mLs (7.5 mg) subcutaneously every 7 days. 2 mL 3     torsemide (DEMADEX) 20 MG tablet Take 1 tablet (20 mg) by mouth daily 90 tablet 3       Family History  Mother and father with hx of type 2 DM.  Social History   reports that he has never smoked. He has never used smokeless tobacco. He reports that he does not drink alcohol and does not use drugs.     Past Medical History  Past Medical History:   Diagnosis Date     Acute kidney injury      CKD (chronic kidney disease)      Coronary artery disease      Diabetes mellitus (H)      HTN (hypertension)      Hyperlipidemia      ICD (implantable cardioverter-defibrillator), single chamber- NOT dependent 7/17/2018     Ischemic cardiomyopathy      LV (left ventricular) mural thrombus      Paroxysmal atrial flutter (H)      RVF (right ventricular failure) (H)      Systolic heart failure (H)      Ventricular tachycardia (H)        Past Surgical History:   Procedure Laterality Date     BRONCHOSCOPY (RIGID OR FLEXIBLE), DIAGNOSTIC N/A 9/15/2020    Procedure: BRONCHOSCOPY, WITH BRONCHOALVEOLAR LAVAGE;  Surgeon: Elder Mejia MD;  Location: [facility]     BRONCHOSCOPY (RIGID OR FLEXIBLE), DIAGNOSTIC N/A 9/17/2020    Procedure: BRONCHOSCOPY, WITH BRONCHOALVEOLAR LAVAGE;  Surgeon: Bill Lemus MD;  Location:  OR     BRONCHOSCOPY RIGID OR FLEXIBLE W/TRANSENDOSCOPIC ENDOBRONCHIAL ULTRASOUND GUIDED Right 9/8/2020    Procedure: BRONCHOSCOPY, WITH ENDOBRONCHIAL ULTRASOUND;  Surgeon: Donny Mercedes MD;  Location:  GI      COLONOSCOPY N/A 12/7/2018    Procedure: COLONOSCOPY;  Surgeon: Krish Mercado MD;  [facility]     CV CORONARY ANGIOGRAM N/A 6/23/2021    Procedure: CV CORONARY ANGIOGRAM;  Surgeon: Brian Long MD;  [facility] HE[facility]AC [facility]     CV CORONARY ANGIOGRAM N/A 5/18/2022    Procedure: Coronary Angiogram [[id number]];  Surgeon: Khris Mcclelland MD;  [facility]     CV CORONARY ANGIOGRAM N/A 5/11/2023    Procedure: Coronary Angiogram;  Surgeon: Khris Mcclelland MD;  [facility]     CV CORONARY ANGIOGRAM N/A 6/7/2024    Procedure: Coronary Angiogram;  Surgeon: Kit Bacon MD;  [facility]     CV HEART BIOPSY N/A 5/24/2020    Procedure: Heart Biopsy;  Surgeon: Kit Bacon MD;  [facility]     CV HEART BIOPSY N/A 6/1/2020    Procedure: CV HEART BIOPSY;  Surgeon: Kit Bacon MD;  [facility]     CV HEART BIOPSY N/A 6/8/2020    Procedure: CV HEART BIOPSY;  Surgeon: Kit Bacon MD;  [facility]     CV HEART BIOPSY N/A 6/15/2020    Procedure: CV HEART BIOPSY;  Surgeon: Kit Bacon MD;  [facility]     CV HEART BIOPSY N/A 6/30/2020    Procedure: CV HEART BIOPSY;  Surgeon: Kit Bacon MD;  [facility]     CV HEART BIOPSY N/A 7/14/2020    Procedure: CV HEART BIOPSY;  Surgeon: Brian Long MD;  [facility]     CV HEART BIOPSY N/A 7/29/2020    Procedure: CV HEART BIOPSY;  Surgeon: Momo Hunt MD;  [facility]     CV HEART BIOPSY N/A 8/13/2020    Procedure: CV HEART BIOPSY;  Surgeon: Khris Mcclelland MD;  Location:  HEART CARDIAC CATH LAB     CV HEART BIOPSY N/A 8/26/2020    Procedure: CV HEART BIOPSY;  Surgeon: Momo Hunt,  MD;  [facility]     CV HEART BIOPSY N/A 9/30/2020    Procedure: CV HEART BIOPSY;  Surgeon: Artemio Ulloa MD;  [facility]     CV HEART BIOPSY N/A 10/29/2020    Procedure: CV HEART BIOPSY;  Surgeon: Kit Bacon MD;  [facility]     CV HEART BIOPSY N/A 1/5/2021    Procedure: CV HEART BIOPSY;  Surgeon: Carlin Garcia MD;  [facility]     CV HEART BIOPSY N/A 6/23/2021    Procedure: CV HEART BIOPSY;  Surgeon: Brian Long MD;  Location[facility] CARD[facility] CATH LAB     CV HEART BIOPSY N/A 10/26/2021    Procedure: CV HEART BIOPSY;  Surgeon: Kit Bacon MD;  Location: [facility] CARD[facility]AC CATH LAB     CV HEART BIOPSY N/A 5/18/2022    Procedure: Heart Biopsy;  Surgeon: Khris Mcclelland MD;  Location[facility]ARD[facility] CATH LAB     CV HEART BIOPSY N/A 5/11/2023    Procedure: Heart Biopsy;  Surgeon: Khris Mcclelland MD;  Location:  HEART CARDIAC CATH LAB     CV RIGHT HEART CATH MEASUREMENTS RECORDED N/A 2/19/2019    Procedure: RHC;  Surgeon: Brian Long MD;  Location:  HEART CARDIAC CATH LAB     CV RIGHT HEART CATH MEASUREMENTS RECORDED N/A 7/5/2019    Procedure: CV RIGHT HEART CATH;  Surgeon: Khris Mcclelland MD;  Location:  HEART CARDIAC CATH LAB     CV RIGHT HEART CATH MEASUREMENTS RECORDED N/A 5/24/2020    Procedure: Right Heart Cath;  Surgeon: Kit Bacon MD;  Location:  HEART CARDIAC CATH LAB     CV RIGHT HEART CATH MEASUREMENTS RECORDED N/A 6/1/2020    Procedure: CV RIGHT HEART CATH;  Surgeon: Kit Bacon MD;  Location:  HEART CARDIAC CATH LAB     CV RIGHT HEART CATH MEASUREMENTS RECORDED N/A 6/8/2020    Procedure: CV RIGHT HEART CATH;  Surgeon: Kit Bacon MD;  [facility]     CV RIGHT HEART CATH MEASUREMENTS RECORDED N/A 6/15/2020    Procedure: CV RIGHT HEART  CATH;  Surgeon: Kit Bacon MD;  Location: [facility] CARD[facility]AC CATH LAB     CV RIGHT HEART CATH MEASUREMENTS RECORDED N/A 6/30/2020    Procedure: CV RIGHT HEART CATH;  Surgeon: Kit Bacon MD;  Location: [facility] CARDIAC CATH LAB     CV RIGHT HEART CATH MEASUREMENTS RECORDED N/A 7/14/2020    Procedure: CV RIGHT HEART CATH;  Surgeon: Brian Long MD;  Location: [facility] CARDIAC CATH LAB     CV RIGHT HEART CATH MEASUREMENTS RECORDED N/A 7/29/2020    Procedure: CV RIGHT HEART CATH;  Surgeon: Momo Hunt MD;  Location: [facility] CARDIAC CATH LAB     CV RIGHT HEART CATH MEASUREMENTS RECORDED N/A 8/13/2020    Procedure: CV RIGHT HEART CATH;  Surgeon: Khris Mcclelland MD;  Location:  HEART CARDIAC CATH LAB     CV RIGHT HEART CATH MEASUREMENTS RECORDED N/A 8/26/2020    Procedure: CV RIGHT HEART CATH;  Surgeon: Momo Hunt MD;  Location:  HEART CARDIAC CATH LAB     CV RIGHT HEART CATH MEASUREMENTS RECORDED N/A 9/30/2020    Procedure: Right Heart Cath;  Surgeon: Artemio Ulloa MD;  Location:  HEART CARDIAC CATH LAB     CV RIGHT HEART CATH MEASUREMENTS RECORDED N/A 10/29/2020    Procedure: CV RIGHT HEART CATH;  Surgeon: Kit Bacon MD;  Location:  HEART CARDIAC CATH LAB     CV RIGHT HEART CATH MEASUREMENTS RECORDED N/A 1/5/2021    Procedure: CV RIGHT HEART CATH;  Surgeon: Carlin Garcia MD;  Location:  HEART CARDIAC CATH LAB     CV RIGHT HEART CATH MEASUREMENTS RECORDED N/A 6/23/2021    Procedure: CV RIGHT HEART CATH;  Surgeon: Brian Long MD;  Location:  HEART CARDIAC CATH LAB     CV RIGHT HEART CATH MEASUREMENTS RECORDED N/A 10/26/2021    Procedure: CV RIGHT HEART CATH;  Surgeon: Kit Bacon MD;  Location:  HEART CARDIAC CATH LAB     CV RIGHT HEART CATH MEASUREMENTS RECORDED N/A 5/18/2022    Procedure: Right Heart Catheterization;  Surgeon: Khris Mcclelland MD;  Location: [facility] CARDIAC  "CATH LAB     CV RIGHT HEART CATH MEASUREMENTS RECORDED N/A 5/11/2023    Procedure: Right Heart Catheterization;  Surgeon: Khris Mcclelland MD;  Location:  HEART CARDIAC CATH LAB     CV RIGHT HEART CATH MEASUREMENTS RECORDED N/A 6/7/2024    Procedure: Right Heart Catheterization;  Surgeon: Kit Bacon MD;  Location:  HEART CARDIAC CATH LAB     ENDOBRONCHIAL ULTRASOUND FLEXIBLE N/A 9/17/2020    Procedure: BRONCHOSCOPY, flexible, endobronchial ultrasound with transbronchial biopsies, endobronchial biopsies;  Surgeon: Bill Lemus MD;  [facility]      PRQ [id number] CORONARY ANGIOPLASTY ADDL BRANCH      PCI and ALYSSA to ostial LAD on 6/27/18     IMPLANT AUTOMATIC IMPLANTABLE CARDIOVERTER DEFIBRILLATOR       INSERT VENTRICULAR ASSIST DEVICE LEFT (HEARTMATE II) N/A 12/31/2018    Procedure: Median Sternotomy, On Cardiopulmonary Bypass Pump, Insertion of Left Ventricular Assist Device (Heartmate III);  Surgeon: Kamaljit Baker MD;  [facility]     PICC DOUBLE LUMEN PLACEMENT Right 05/22/2020    5FR DL PICC inserted at right basilic vein, length 38cm.     TRANSPLANT HEART RECIPIENT AFTER HEART MATE N/A 5/18/2020    Procedure: REDO MEDIAN STERNOTOMY, LYSIS OF ADHESIONS, ON CARDIOPULMONARY BYPASS PUMP, HEART TRANSPLANT RECIPIENT, REMOVAL OF LEFT VENTRICULAR ASSIST DEVICE, REMOVAL OF AUTOMATED IMPLANTABLE CARDIOVERTER DEFIBRILLATOR;  Surgeon: Elder Willis MD;  [facility]       Physical Exam  Wt Readings from Last 10 Encounters:   03/11/25 83.9 kg (185 lb)   06/07/24 87.6 kg (193 lb 1.6 oz)   06/07/24 89 kg (196 lb 4.8 oz)   05/11/23 87.5 kg (193 lb)   05/11/23 87.5 kg (193 lb)   02/15/23 85.7 kg (188 lb 14.4 oz)   05/18/22 91.6 kg (202 lb)   02/23/22 91.9 kg (202 lb 8 oz)   01/18/22 91.2 kg (201 lb)   10/26/21 85.3 kg (188 lb)     Estimated body mass index is 31.76 kg/m  as calculated from the following:    Height as of this encounter: 1.626 m (5' 4\").    Weight as of this " encounter: 83.9 kg (185 lb).    Pleasant articulate overweight male in no acute distress who walks through his home throughout our visit.  Voice and speech are clear and articulate.  Neck is supple no JVD or masses are appreciated.  No cough sneeze or dyspnea appreciated.    RESULTS    Recent Labs   Lab Test 06/07/24  0837 05/11/23  0802 06/23/21  0654 03/03/21  1152 07/05/19  0932 06/26/19  0925 07/24/18  0604 07/23/18  0645   A1C 7.3* 7.4*   < >  --    < >  --    < >  --    TSH  --   --   --  1.87  --  3.94  --  6.91*   T4  --   --   --  1.08  --   --   --  1.05   LDL 92 61   < >  --    < >  --    < >  --    HDL 45 36*   < >  --    < >  --    < >  --    TRIG 206* 280*   < >  --    < >  --    < >  --    CR 1.47* 1.75*   < >  --    < > 1.64*   < > 1.74*    < > = values in this interval not displayed.     Had outside A1C and was 7.9       35 minutes spent on the date of the encounter doing chart review, history and exam, documentation, education and counseling, as well as communication and coordination of care, and further activities as noted above.   This time includes time spent reviewing CGM, reviewing and ordering lab tests, medications and supplies.      It is my privilege to be involved in the care of the above patient.     Maye Neri PA-C, MPAS  Diabetes, Endocrinology, and Metabolism  [phone] Appointments/Nurse Line  [phone] Fax  [phone] office  [email or web address]         Virtual Visit Details   Joined the call at 3/11/2025, 8:40:52 am.  Left the call at 3/11/2025, 8:56:22 am.  You were on the call for 15 minutes 29 seconds .   Type of service:  Video Visit   Video Start Time: 8:40 AM  Video End Time:8:56 AM    Originating Location (pt. Location): Home    Distant Location (provider location):  Off-site  Platform used for Video Visit: AmWell          Outcome for 03/07/25 10:07 AM: Data obtained via [employer] website  Dorothy Villeda MA    Patient is showing 5/5 [place] met.   Dorothy Villeda,  MA               Again, thank you for allowing me to participate in the care of your patient.      Sincerely,    Maye Neri PA-C

## 2025-03-11 NOTE — NURSING NOTE
Current patient location: [address] [place] [id number]    Is the patient currently in the state of [place]? YES    Visit mode: VIDEO    If the visit is dropped, the patient can be reconnected by:VIDEO VISIT: Text to cell phone:   Telephone Information:   Mobile [phone]       Will anyone else be joining the visit? NO  (If patient encounters technical issues they should call [phone]    Are changes needed to the allergy or medication list? No    Are refills needed on medications prescribed by this physician? NO    Rooming Documentation:  Questionnaire(s) completed    Reason for visit: RECHECK    Ndaine GOMEZF

## 2025-03-17 ENCOUNTER — TELEPHONE (OUTPATIENT)
Dept: ENDOCRINOLOGY | Facility: CLINIC | Age: 65
End: 2025-03-17
Payer: COMMERCIAL

## 2025-03-17 NOTE — TELEPHONE ENCOUNTER
Patient Contacted for the patient to call back and schedule the following:    Appointment type: RETURN DIABETES  Provider: Jeannette Schafer PA-C  Return date: 6m, ~09/11/25  Specialty phone number: [phone]  Additional appointment(s) needed: RDI with MD at [facility] in 1yr, ~03/11/26  Additonal Notes: Spoke with pt, he's driving and asked for a call back in a couple hours. MyCx1 with contact info.    Disposition: Return in about 6 months (around 9/11/2025), or self, Leticia or MD (needs to establisg care with an MD can be in 12 months). [CER [id number]     Hina Joseph on 3/17/2025 at 2:03 PM

## 2025-03-18 NOTE — Clinical Note
Chronic, at goal (stable), continue current treatment plan          Lab results reviewed and abnormals discussed with physician.

## 2025-03-19 NOTE — TELEPHONE ENCOUNTER
Patient confirmed scheduled appointment:  Date: 09/11/25  Time: 10:30  Visit type: RETURN DIABETES  Provider: Jeannette Schafer PA-C  Location: [facility] DIABETES  Testing/imaging: N/A  Additional notes: Scheduled per pt. Pt declined to schedule 1yr appt, will wait until he sees Leticia next to schedule with MD. Hina Joseph on 3/19/2025 at 10:33 AM

## 2025-04-09 ENCOUNTER — VIRTUAL VISIT (OUTPATIENT)
Dept: PHARMACY | Facility: CLINIC | Age: 65
End: 2025-04-09
Attending: PHYSICIAN ASSISTANT
Payer: COMMERCIAL

## 2025-04-09 DIAGNOSIS — Z79.4 TYPE 2 DIABETES MELLITUS WITH HYPERGLYCEMIA, WITH LONG-TERM CURRENT USE OF INSULIN (H): Primary | ICD-10-CM

## 2025-04-09 DIAGNOSIS — E11.65 TYPE 2 DIABETES MELLITUS WITH HYPERGLYCEMIA, WITH LONG-TERM CURRENT USE OF INSULIN (H): Primary | ICD-10-CM

## 2025-04-09 NOTE — PROGRESS NOTES
Medication Therapy Management (MTM) Encounter    ASSESSMENT:                            Medication Adherence/Access: See below for considerations    Type 2 Diabetes:   A1C above goal of < 7%, nearing time in range goal of > 70% since upgrading Ozempic to Mounjaro last year. Given tolerating well, would benefit from dose increase today to help achieve glycemic goals and ideally reduce insulin need. Reasonable to relax insulin program to mitigate risk of hypoglycemia in anticipation ofMounjaro dose increase.     PLAN:                            Increase Mounjaro to 7.5 mg weekly    When you make the Mounjaro dose change, please decrease Lantus to 30 units daily and continue to decrease by 2 units every 3 days if FPG consistently < 100    When you make the Mounjaro dose change, consider weakening Humalog ICR 1:12 and 1:18 if starting to exercise heavily    Please let me know if you have any low blood sugars < 70    Endocrine Team & Next Follow-Up:  9/11/2025 with Leticia Schafer PA-C   5/2/2025 with Warner     SUBJECTIVE/OBJECTIVE:                          Red Sharp is a 64 year old male seen for a follow up visit with MTM pharmacist referred by Dr. Schafer.     Reason for visit: MTM    Allergies/ADRs: Reviewed in chart  Past Medical History: Reviewed in chart  Tobacco: He reports that he has never smoked. He has never used smokeless tobacco.    Medication Adherence/Access: no issues reported    Diabetes   Farxiga 10 mg tablets take once daily   Lantus 32 units at bedtime  Humalog Kwikpen 1 unit for every 10 g of carbs with meals and snacks, plus correction (about 70 units per day). Usually 8-10 units with meals. Misses 2-3 times per week due to busy schedule   Mounjaro 5 mg weekly (came from Ozempic). Received 7.5 mg dose but has not started yet (planning on starting this week).    No side effects/concerns, other than expressing interest to go up on the Mounjaro dose to help improve glycemic control.     Blood sugar  monitoring: Continuous Glucose Monitor - [facility]         Lab Results   Component Value Date    A1C 7.3 (H) 06/07/2024    A1C 7.4 (H) 05/11/2023    A1C  02/15/2023      Comment:      The previously reported result may be inaccurate due to a laboratory instrument calibration issue. Providers should order a new test to be performed if clinically indicated. [facility] will issue a credit for this test.  This is a corrected result. Previous result was 9.6 % on 2/15/2023 at  5:30 PM CST          ----------------      I spent 15 minutes with this patient today. All changes were made via collaborative practice agreement with Leticia Schafer PA-C. A copy of the visit note was provided to the patient's provider(s).    A summary of these recommendations was given to the patient.    Warner Cedeño, PharmD, [facility]  Endocrine & Diabetes [facility] Pharmacist  [address] [place], [place] [id number]    Telemedicine Visit Details  Type of service:  Video Conference via TellFi  Start Time:  1PM  End Time:  1:15PM     Medication Therapy Recommendations  Type 2 diabetes mellitus with hyperglycemia, with long-term current use of insulin (H)   1 Current Medication: Tirzepatide (MOUNJARO) 7.5 MG/0.5ML SOAJ auto-injector pen   Current Medication Sig: Inject 0.5 mLs (7.5 mg) subcutaneously every 7 days.   Rationale: Dose too low - Dosage too low - Effectiveness   Recommendation: Increase Dose   Status: Accepted per CPA   Identified Date: 4/9/2025 Completed Date: 4/9/2025

## 2025-05-04 NOTE — Clinical Note
6  images have been sent and verified in PACs
Aleyda PORTER 2A
Catheter removed over wire.
Conscious sedation is planned for this procedure
Contrast risk dose (3.7 * GFR)    200 mL for 100%, 150 mL for 75%  
Hemodynamic equipment used: 5 lead ECG, Calometric ETCO2 device, Machine BP Cuff and pulse oximeter probe.
LCA Cine(s)  injected and visualized utilizing power injector system.
Lab results reviewed and abnormals discussed with physician.
Patient education provided. 
Potential access sites were evaluated for patency using ultrasound.   The right jugular vein was selected. Access was obtained under with Sonosite guidance using a standard 18 guage needle with direct visualization of needle entry.   
Potential access sites were evaluated for patency using ultrasound.   The right radial artery was selected. Access was obtained under with Sonosite guidance using a standard 18 guage needle with direct visualization of needle entry.   
Prepped: groin, right radial and right neck. Prepped with: ChloraPrep. The patient was draped. .Pre-procedure site marking:Insertion site not predetermined
Procedure is scheduled in [facility].
R-Band utilized for closure of site.  ; hemostasis achieved.
RCA Cine(s)  injected and visualized utilizing power injector system.
Removed intact
Sheath inserted in the right internal jugular vein.
Sheath inserted in the right radial artery.
The ECG shows a sinus rhythm. ECG rate  = 105 bpm.
Total IV Fluids Infused 550 mL.
Total contrast given (ml) 47
dry, intact, no bleeding and no hematoma. RIJ 7Fr- removed, pressure held to hemostasis, primapore.   RRA 6Fr- removed, TR band applied, air instilled, armboard.
04-May-2025 15:02

## 2025-05-21 DIAGNOSIS — Z94.1 STATUS POST HEART TRANSPLANTATION (H): ICD-10-CM

## 2025-05-27 RX ORDER — MYCOPHENOLATE MOFETIL 250 MG/1
1250 CAPSULE ORAL 2 TIMES DAILY
Qty: 300 CAPSULE | Refills: 11 | Status: SHIPPED | OUTPATIENT
Start: 2025-05-27

## 2025-06-11 NOTE — PATIENT INSTRUCTIONS
We appreciate your assistance in coordinating your healthcare.     Please upload your insulin pump, blood sugar meter and/or continuous glucose monitor at home 1-2 days before your next diabetes-related appointment.   This will allow your provider to review your  data before your scheduled virtual visit.    To ask a question to your Endocrine care team, please send them a [email or web address] message, or reach them by phone at [phone]     To expedite your medication refill(s), please contact your pharmacy and have them   fax a refill request to: [phone].  *Please allow 3 business days for routine medication refills.  *Please allow 5 business days for controlled substance medication refills.    For after-hours urgent Endocrine issues, that do not require 391, please dial [phone], and ask to speak with the Endocrinologist On-Call           [de-identified] : 69 year F present with Chronic Sinusitis, NSD, PND States has been consistent with Sinus Rinse + Budesonide BID Feels there is still drainage in the back of the throat Denies any recent ear, nose, throat infection

## 2025-07-09 ENCOUNTER — TELEPHONE (OUTPATIENT)
Dept: TRANSPLANT | Facility: CLINIC | Age: 65
End: 2025-07-09
Payer: COMMERCIAL

## 2025-07-09 DIAGNOSIS — Z12.5 PROSTATE CANCER SCREENING: ICD-10-CM

## 2025-07-09 DIAGNOSIS — Z94.1 STATUS POST HEART TRANSPLANTATION (H): Primary | ICD-10-CM

## 2025-07-09 DIAGNOSIS — E11.9 DIABETES MELLITUS, TYPE 2 (H): ICD-10-CM

## 2025-07-09 DIAGNOSIS — Z13.220 LIPID SCREENING: ICD-10-CM

## 2025-07-10 ENCOUNTER — TELEPHONE (OUTPATIENT)
Dept: CARDIOLOGY | Facility: CLINIC | Age: 65
End: 2025-07-10
Payer: COMMERCIAL

## 2025-07-11 DIAGNOSIS — Z94.1 HEART REPLACED BY TRANSPLANT (H): ICD-10-CM

## 2025-07-14 RX ORDER — LOSARTAN POTASSIUM 25 MG/1
50 TABLET ORAL DAILY
Qty: 180 TABLET | Refills: 3 | Status: SHIPPED | OUTPATIENT
Start: 2025-07-14

## 2025-07-15 ENCOUNTER — HOSPITAL ENCOUNTER (OUTPATIENT)
Dept: CARDIOLOGY | Facility: CLINIC | Age: 65
Discharge: HOME | End: 2025-07-15
Attending: INTERNAL MEDICINE
Payer: COMMERCIAL

## 2025-07-15 ENCOUNTER — HOSPITAL ENCOUNTER (OUTPATIENT)
Dept: GENERAL RADIOLOGY | Facility: CLINIC | Age: 65
Discharge: HOME | End: 2025-07-15
Attending: INTERNAL MEDICINE
Payer: COMMERCIAL

## 2025-07-15 ENCOUNTER — OFFICE VISIT (OUTPATIENT)
Dept: CARDIOLOGY | Facility: CLINIC | Age: 65
End: 2025-07-15
Attending: INTERNAL MEDICINE
Payer: COMMERCIAL

## 2025-07-15 ENCOUNTER — LAB (OUTPATIENT)
Dept: LAB | Facility: CLINIC | Age: 65
End: 2025-07-15
Payer: COMMERCIAL

## 2025-07-15 VITALS
WEIGHT: 177.8 LBS | DIASTOLIC BLOOD PRESSURE: 70 MMHG | BODY MASS INDEX: 30.52 KG/M2 | SYSTOLIC BLOOD PRESSURE: 101 MMHG | HEART RATE: 118 BPM | OXYGEN SATURATION: 97 %

## 2025-07-15 DIAGNOSIS — E78.2 MIXED HYPERLIPIDEMIA: ICD-10-CM

## 2025-07-15 DIAGNOSIS — Z94.1 HEART REPLACED BY TRANSPLANT (H): ICD-10-CM

## 2025-07-15 DIAGNOSIS — Z94.1 STATUS POST HEART TRANSPLANTATION (H): ICD-10-CM

## 2025-07-15 DIAGNOSIS — Z12.5 PROSTATE CANCER SCREENING: ICD-10-CM

## 2025-07-15 DIAGNOSIS — E11.9 DIABETES MELLITUS, TYPE 2 (H): ICD-10-CM

## 2025-07-15 DIAGNOSIS — N18.32 STAGE 3B CHRONIC KIDNEY DISEASE (H): ICD-10-CM

## 2025-07-15 DIAGNOSIS — E11.65 TYPE 2 DIABETES MELLITUS WITH HYPERGLYCEMIA, WITH LONG-TERM CURRENT USE OF INSULIN (H): ICD-10-CM

## 2025-07-15 DIAGNOSIS — I50.82 BIVENTRICULAR HEART FAILURE (H): ICD-10-CM

## 2025-07-15 DIAGNOSIS — R97.20 ELEVATED PSA: Primary | ICD-10-CM

## 2025-07-15 DIAGNOSIS — I10 BENIGN ESSENTIAL HYPERTENSION: ICD-10-CM

## 2025-07-15 DIAGNOSIS — Z79.4 TYPE 2 DIABETES MELLITUS WITH HYPERGLYCEMIA, WITH LONG-TERM CURRENT USE OF INSULIN (H): ICD-10-CM

## 2025-07-15 DIAGNOSIS — Z79.899 ENCOUNTER FOR LONG-TERM (CURRENT) USE OF HIGH-RISK MEDICATION: ICD-10-CM

## 2025-07-15 DIAGNOSIS — Z13.220 LIPID SCREENING: ICD-10-CM

## 2025-07-15 LAB
ALBUMIN SERPL BCG-MCNC: 4.5 G/DL (ref 3.5–5.2)
ALP SERPL-CCNC: 70 U/L (ref 40–150)
ALT SERPL W P-5'-P-CCNC: 31 U/L (ref 0–70)
ANION GAP SERPL CALCULATED.3IONS-SCNC: 13 MMOL/L (ref 7–15)
AST SERPL W P-5'-P-CCNC: 26 U/L (ref 0–45)
BASOPHILS # BLD AUTO: 0.1 10E3/UL (ref 0–0.2)
BASOPHILS NFR BLD AUTO: 1 %
BILIRUB SERPL-MCNC: 1.2 MG/DL
BUN SERPL-MCNC: 28.5 MG/DL (ref 8–23)
CALCIUM SERPL-MCNC: 9.6 MG/DL (ref 8.8–10.4)
CHLORIDE SERPL-SCNC: 109 MMOL/L (ref 98–107)
CHOLEST SERPL-MCNC: 161 MG/DL
CK SERPL-CCNC: 169 U/L (ref 39–308)
CMV DNA SPEC NAA+PROBE-ACNC: NOT DETECTED IU/ML
CREAT SERPL-MCNC: 1.88 MG/DL (ref 0.67–1.17)
EBV DNA SERPL NAA+PROBE-ACNC: NOT DETECTED IU/ML
EGFRCR SERPLBLD CKD-EPI 2021: 40 ML/MIN/1.73M2
EOSINOPHIL # BLD AUTO: 0.1 10E3/UL (ref 0–0.7)
EOSINOPHIL NFR BLD AUTO: 1 %
ERYTHROCYTE [DISTWIDTH] IN BLOOD BY AUTOMATED COUNT: 13.4 % (ref 10–15)
EST. AVERAGE GLUCOSE BLD GHB EST-MCNC: 189 MG/DL
FASTING STATUS PATIENT QL REPORTED: YES
FASTING STATUS PATIENT QL REPORTED: YES
GLUCOSE SERPL-MCNC: 180 MG/DL (ref 70–99)
HBA1C MFR BLD: 8.2 %
HCO3 SERPL-SCNC: 19 MMOL/L (ref 22–29)
HCT VFR BLD AUTO: 45.2 % (ref 40–53)
HDLC SERPL-MCNC: 34 MG/DL
HGB BLD-MCNC: 14.6 G/DL (ref 13.3–17.7)
IMM GRANULOCYTES # BLD: 0 10E3/UL
IMM GRANULOCYTES NFR BLD: 0 %
LDLC SERPL CALC-MCNC: 67 MG/DL
LYMPHOCYTES # BLD AUTO: 0.9 10E3/UL (ref 0.8–5.3)
LYMPHOCYTES NFR BLD AUTO: 14 %
MAGNESIUM SERPL-MCNC: 2.2 MG/DL (ref 1.7–2.3)
MCH RBC QN AUTO: 26.6 PG (ref 26.5–33)
MCHC RBC AUTO-ENTMCNC: 32.3 G/DL (ref 31.5–36.5)
MCV RBC AUTO: 82 FL (ref 78–100)
MONOCYTES # BLD AUTO: 0.6 10E3/UL (ref 0–1.3)
MONOCYTES NFR BLD AUTO: 10 %
NEUTROPHILS # BLD AUTO: 4.5 10E3/UL (ref 1.6–8.3)
NEUTROPHILS NFR BLD AUTO: 73 %
NONHDLC SERPL-MCNC: 127 MG/DL
NRBC # BLD AUTO: 0 10E3/UL
NRBC BLD AUTO-RTO: 0 /100
PHOSPHATE SERPL-MCNC: 2.6 MG/DL (ref 2.5–4.5)
PLATELET # BLD AUTO: 258 10E3/UL (ref 150–450)
POTASSIUM SERPL-SCNC: 4.5 MMOL/L (ref 3.4–5.3)
PROT SERPL-MCNC: 7.2 G/DL (ref 6.4–8.3)
PSA SERPL DL<=0.01 NG/ML-MCNC: 16.81 NG/ML (ref 0–4.5)
RBC # BLD AUTO: 5.49 10E6/UL (ref 4.4–5.9)
SODIUM SERPL-SCNC: 141 MMOL/L (ref 135–145)
SPECIMEN TYPE: NORMAL
TRIGL SERPL-MCNC: 300 MG/DL
WBC # BLD AUTO: 6.2 10E3/UL (ref 4–11)

## 2025-07-15 PROCEDURE — 87799 DETECT AGENT NOS DNA QUANT: CPT | Performed by: INTERNAL MEDICINE

## 2025-07-15 PROCEDURE — 3078F DIAST BP <80 MM HG: CPT | Performed by: INTERNAL MEDICINE

## 2025-07-15 PROCEDURE — 250N000009 HC RX 250: Performed by: INTERNAL MEDICINE

## 2025-07-15 PROCEDURE — 255N000002 HC RX 255 OP 636: Performed by: INTERNAL MEDICINE

## 2025-07-15 PROCEDURE — 99215 OFFICE O/P EST HI 40 MIN: CPT | Mod: 25 | Performed by: INTERNAL MEDICINE

## 2025-07-15 PROCEDURE — 93325 DOPPLER ECHO COLOR FLOW MAPG: CPT | Mod: TC

## 2025-07-15 PROCEDURE — 1126F AMNT PAIN NOTED NONE PRSNT: CPT | Performed by: INTERNAL MEDICINE

## 2025-07-15 PROCEDURE — 71046 X-RAY EXAM CHEST 2 VIEWS: CPT | Mod: 26 | Performed by: RADIOLOGY

## 2025-07-15 PROCEDURE — G2211 COMPLEX E/M VISIT ADD ON: HCPCS | Performed by: INTERNAL MEDICINE

## 2025-07-15 PROCEDURE — 3074F SYST BP LT 130 MM HG: CPT | Performed by: INTERNAL MEDICINE

## 2025-07-15 PROCEDURE — 258N000003 HC RX IP 258 OP 636: Performed by: INTERNAL MEDICINE

## 2025-07-15 PROCEDURE — 250N000011 HC RX IP 250 OP 636: Performed by: INTERNAL MEDICINE

## 2025-07-15 PROCEDURE — G0463 HOSPITAL OUTPT CLINIC VISIT: HCPCS | Performed by: INTERNAL MEDICINE

## 2025-07-15 PROCEDURE — 83036 HEMOGLOBIN GLYCOSYLATED A1C: CPT | Performed by: INTERNAL MEDICINE

## 2025-07-15 PROCEDURE — 71046 X-RAY EXAM CHEST 2 VIEWS: CPT

## 2025-07-15 PROCEDURE — 86352 CELL FUNCTION ASSAY W/STIM: CPT | Performed by: INTERNAL MEDICINE

## 2025-07-15 RX ORDER — ATROPINE SULFATE 0.4 MG/ML
.2-.4 AMPUL (ML) INJECTION
Status: DISCONTINUED | OUTPATIENT
Start: 2025-07-15 | End: 2025-07-16 | Stop reason: HOSPADM

## 2025-07-15 RX ORDER — LIDOCAINE 40 MG/G
CREAM TOPICAL
Status: DISCONTINUED | OUTPATIENT
Start: 2025-07-15 | End: 2025-07-16 | Stop reason: HOSPADM

## 2025-07-15 RX ORDER — METOPROLOL TARTRATE 1 MG/ML
1-5 INJECTION, SOLUTION INTRAVENOUS
Status: DISCONTINUED | OUTPATIENT
Start: 2025-07-15 | End: 2025-07-15 | Stop reason: HOSPADM

## 2025-07-15 RX ORDER — DOBUTAMINE HYDROCHLORIDE 200 MG/100ML
10-50 INJECTION INTRAVENOUS CONTINUOUS
Status: DISCONTINUED | OUTPATIENT
Start: 2025-07-15 | End: 2025-07-15 | Stop reason: HOSPADM

## 2025-07-15 RX ORDER — SODIUM CHLORIDE 9 MG/ML
INJECTION, SOLUTION INTRAVENOUS CONTINUOUS PRN
Status: DISCONTINUED | OUTPATIENT
Start: 2025-07-15 | End: 2025-07-15 | Stop reason: HOSPADM

## 2025-07-15 RX ORDER — NITROGLYCERIN 0.4 MG/1
0.4 TABLET SUBLINGUAL EVERY 5 MIN PRN
Status: DISCONTINUED | OUTPATIENT
Start: 2025-07-15 | End: 2025-07-15 | Stop reason: HOSPADM

## 2025-07-15 RX ADMIN — PERFLUTREN 5 ML (DILUTED): 6.52 INJECTION, SUSPENSION INTRAVENOUS at 14:21

## 2025-07-15 RX ADMIN — METOPROLOL TARTRATE 2 MG: 5 INJECTION INTRAVENOUS at 14:12

## 2025-07-15 RX ADMIN — METOPROLOL TARTRATE 1 MG: 5 INJECTION INTRAVENOUS at 14:15

## 2025-07-15 RX ADMIN — DEXTROSE 10 MCG/KG/MIN: 50 INJECTION, SOLUTION INTRAVENOUS at 14:08

## 2025-07-15 ASSESSMENT — PAIN SCALES - GENERAL: PAINLEVEL_OUTOF10: NO PAIN (0)

## 2025-07-15 NOTE — LETTER
"7/15/2025      RE: Mariusz Sharp  [address] [facility] [address]       Dear Colleague,    Thank you for the opportunity to participate in the care of your patient, Mariusz Sharp, at the [facility] [place] [facility]. Please see a copy of my visit note below.    July 15, 2025      \"Anju Sharp is a 64yr old male with a history of CAD (STEMI with ALYSSA to LAD 6/27/18), ICM (LVEF 20-25%, s/p HM3 LVAD 12/31/18) s/p OHT 5/18/20, monomorphic VT (s/p ICD with shocks), LV thrombus, CKD, elevated PSA, pAF, HTN, HL, and DMII who presents to clinic for routine follow up.   His post-transplant course was c/b NSVT (with no hemodynamic compromise), leukocytosis with C Acnes growing from his former LVAD site (thought to be a contaminant, but treated with IV vanco --> po doxy through 6/1/20, 14d course total), and in the setting of donor blood growing S anginosus and Veillonella (also thought to be a contaminant, but treated with Vanco/zosyn --> Vanco/Flagyl for a total of 7 days).  He also had hyperkalemia, which improved following kayexalate and stopping bactrim.  He ultimately discharged 5/29/20.     He was readmitted 8/31-9/24/20 with fever, cough, diarrhea, and syncope.  He was found to have a post-obstructive vs aspiration pneumonia, RUL/suprahilar mass, and narrowing of multiple RUL bronchi.  He was initially treated as a fungal infection as his fungitelle was +, but he did not clinically improve, nor did the size of the mass change.  Ultimately, tissue culture from the RUL mass showed abiotrophia defectiva (oral microbe), which was thought to be a contaminant, but did respond to antibiotics.  His MMF was decreased to 500mg twice daily now back 1200mg BID     Overall doing very well denies any new complaints or issues.  He has been waiting for the urology team to contact him as well as for potential kidney stone removal however these have not " happened yet.  Otherwise takes all medications as prescribed no new dizziness lightheadedness palpitations fevers chills or any concerns.  Blood pressure at home is higher today it was a little bit lower however here.    Rejection history:  none  AlloMap scores:  < 35  DSAs:  none  Coronary angio/Ischemic eval: Coronary angiogram 6/2021 showed normal coronary arteries with no angiographic evidence of obstruction.  Last RHC:  10/2021 showed normal biventricular filling pressures, with RA 4, mPA 20, PCW 14, and CI 2.7.  Echo/cMRI: TTE 2/2022 showed stable graft function, with LVEF 55-60% and normal RV size/function.     Overall he has been doing well and denies any new complaints.  He did just come from the Cath Lab and the procedure went well.  Denies any dizziness lightheadedness palpitations chest pain lightheadedness or syncope.  No worsening shortness of breath.  Doing well overall.     Serostatus:  CMV D+/R-  EBV D-/R+  Toxo D-/R-      PAST MEDICAL HISTORY:  Past Medical History:   Diagnosis Date     Acute kidney injury      CKD (chronic kidney disease)      Coronary artery disease      Diabetes mellitus (H)      HTN (hypertension)      Hyperlipidemia      ICD (implantable cardioverter-defibrillator), single chamber- NOT dependent 7/17/2018     Ischemic cardiomyopathy      LV (left ventricular) mural thrombus      Paroxysmal atrial flutter (H)      RVF (right ventricular failure) (H)      Systolic heart failure (H)      Ventricular tachycardia (H)      FAMILY HISTORY:  No family history on file.  SOCIAL HISTORY:  Social History     Socioeconomic History     Marital status: Single   Tobacco Use     Smoking status: Never     Smokeless tobacco: Never   Vaping Use     Vaping status: Never Used   Substance and Sexual Activity     Alcohol use: No     Drug use: No   Other Topics Concern     Parent/sibling w/ CABG, MI or angioplasty before 65F 55M? No     CURRENT MEDICATIONS:  Current Outpatient Medications   Medication  Sig Dispense Refill     ACCU-CHEK [place] PLUS test strip Check BG 3 times daily at meals and at bedtime. 400 strip 3     acetaminophen (TYLENOL) 325 MG tablet Take 2 tablets (650 mg) by mouth every 4 hours as needed for other (multimodal surgical pain management along with NSAIDS and opioid medication as indicated based on pain control and physical function.)       amLODIPine (NORVASC) 10 MG tablet Take 1 tablet (10 mg) by mouth daily. 90 tablet 3     aspirin (ASA) 81 MG EC tablet Take 1 tablet (81 mg) by mouth daily 30 tablet 11     BD SILVANA U/F 32G X 4 MM insulin pen needle Use 6 times daily. 600 each 3     calcium carbonate 600 mg-vitamin D 400 units (CALTRATE) 600-400 MG-UNIT per tablet Take 1 tablet by mouth 2 times daily (with meals)       Continuous Glucose Sensor (FREESTYLE DARRELL 2 PLUS SENSOR) MISC 1 each See Admin Instructions. Use as directed per 's instructions to monitor blood sugars continuously. Change sensor every 15 days. 6 each 5     dapagliflozin (FARXIGA) 10 MG TABS tablet Take 1 tablet (10 mg) by mouth daily. 90 tablet 3     everolimus (ZORTRESS) 0.5 MG tablet TAKE 3 TABLETS BY MOUTH EVERY MORNING AND 3 TABLETS EVERY EVENING 540 tablet 3     HUMALOG KWIKPEN 100 UNIT/ML soln INJECT 1 UNIT PER EVERY 8 GRAMS OF CARBS WITH MEALS AND SNACKS, PLUS CORRECTION, APPROX 70 UNITS IN 24 HOURS. Follow up visit needed. Call  to schedule the first available visit. 75 mL 5     insulin glargine (LANTUS PEN) 100 UNIT/ML pen Inject 34 units subcutaneous at hs. May decrease by 2 units every 3 days if morning fasting sugar consistently < 100. Max daily dose = 34 units 15 mL 3     loperamide (IMODIUM) 2 MG capsule Take 1 capsule (2 mg) by mouth 4 times daily as needed for diarrhea 60 capsule 0     losartan (COZAAR) 25 MG tablet TAKE 2 TABLETS(50 MG) BY MOUTH DAILY 180 tablet 3     magnesium oxide (MAG-OX) 400 (241.3 Mg) MG tablet Take 1 tablet (400 mg) by mouth daily 90 tablet 3     multivitamin  w/minerals (THERA-VIT-M) tablet Take 1 tablet by mouth daily 90 tablet 3     mycophenolate (GENERIC EQUIVALENT) 250 MG capsule Take 5 capsules (1,250 mg) by mouth 2 times daily. 300 capsule 11     pantoprazole (PROTONIX) 40 MG EC tablet Take 1 tablet (40 mg) by mouth every morning (before breakfast). 90 tablet 3     rosuvastatin (CRESTOR) 20 MG tablet Take 1 tablet (20 mg) by mouth daily. 90 tablet 3     tirzepatide (MOUNJARO) 12.5 MG/0.5ML SOAJ auto-injector pen Inject 0.5 mLs (12.5 mg) subcutaneously every 7 days. 6 mL 5     torsemide (DEMADEX) 20 MG tablet Take 1 tablet (20 mg) by mouth daily 90 tablet 3     No current facility-administered medications for this visit.     EXAM:  /70 (BP Location: Right arm, Patient Position: Chair, Cuff Size: Adult Regular)   Pulse 118   Wt 80.6 kg (177 lb 12.8 oz)   SpO2 97%   BMI 30.52 kg/m    In general, the patient is a pleasant male in no apparent distress.    HEENT: NC/AT. PERRLA. EOMI.  Sclerae white, not injected.    Neck:  No adenopathy, No thyromegaly.    COR: JVD at clavicle when sitting upright.  RRR.  Normal S1 S2 splits physiologically.  No murmur, rub click, or gallop.    Lungs:  CTA. No rhonchi.    Abdomen: soft, nontender, nondistended.  No organomegaly.  Extremities:  No clubbing, cyanosis, or LE edema.    Neuro: Alert & Oriented x 3, grossly non focal.  Integument: no open lesions, rashes, or jaundice.     Labs:  Lab Results   Component Value Date    WBC 5.8 06/07/2024    HGB 15.7 06/07/2024    HCT 48.1 06/07/2024     06/07/2024     06/07/2024    POTASSIUM 4.1 06/07/2024    CHLORIDE 108 (H) 06/20/2024    CO2 23 06/07/2024    BUN 23.2 (H) 06/07/2024    CR 1.47 (H) 06/07/2024     (H) 06/07/2024    DD 2.0 (H) 06/04/2020    NTBNPI 1,673 (H) 08/18/2020    NTBNP 404 (H) 06/26/2019    TROPI <0.015 09/17/2020    AST 30 06/07/2024    ALT 28 06/07/2024    ALKPHOS 76 06/07/2024    BILITOTAL 1.0 06/07/2024    INR 1.06 08/26/2020     C  "10/26/21:  RA: --/--/6  RV: 27/6  PA: 25/18/22  PCWP: --/--/17  PA Sat: 62.3%  Hb: 11  TD CO/CI: 5/2.62  AMRIT CO/CI: 5.15/2.7  PVR: 1.0 (AMRIT)  SVR 1430      TTE 2/23/22:  Global and regional left ventricular function is normal with an EF of 55-60%.  Right ventricular function, chamber size, wall motion, and thickness are normal.  Pulmonary artery systolic pressure cannot be assessed. The inferior vena cava is normal.  No significant changes noted.      TTE 5/18/22:  Left ventricular function is decreased. The ejection fraction is 40-45% (mildly reduced).  Global right ventricular function is mildly reduced.  No significant valvular abnormalities.  No pericardial effusion is present.  This study was compared with the study from 2/23/22: While comparison with the prior study is challenging due to poor image quality on the previous images, LV and RV function appear to have decreased slightly.     Coronary angiogram/RHC 5/18/22:  NOrmal coroanries  RA 7  RV 31/8  PA 31/18/24  PCWP 17  Cardiac output by Amrit: 7.20 L/min (indexed to 3.6 L/min/m2)  PVR 1.0      RHC 6/2024:  BP  124/69/91 mmHg  RA mean of 6 mmHg and V wave of 7 mmHg  RV 24/6 mmHg  PA 24/15/18 mmHg  PCWP 13 mmHg and V wave of 15 mmHg  TD CO/CI 5.8/3.0   Amrit CO/CI 4.6/2.4  PVR 0.86 LERMA  SVR 1068 dynes/sec/cm-5  PA sat 70%   Hgb 14 g/dL     Assessment and plan:  MrMil \"Red\" Aron is a 64yr old male with a history of CAD (STEMI with ALYSSA to LAD 6/27/18), ICM (LVEF 20-25%, s/p HM3 LVAD 12/31/18) s/p OHT 5/18/20, monomorphic VT (s/p ICD with shocks), LV thrombus, CKD, elevated PSA, pAF, HTN, HL, and DMII who presents to clinic for routine follow up.  Overall testing have been reviewed that is available at the moment and everything looks well.  Most recent ejection fraction was around 50% repeat are pending.  Today he is having a stress echo which we will review.  Also labs pending including EBV CMV.  Renal function is a little bit elevated around 1.88 which is " similar range it had been in the past.  No medication changes are needed today we will continue.  He noted very infrequent episodes of palpitations or at least feeling some thumping sensation and therefore we are going to do a Zio patch monitor for 14 days.  No other changes we will see him back per protocol.     ICM, s/p OHT 5/18/20  His post-transplant course was c/b NSVT (with no hemodynamic compromise), leukocytosis with C Acnes growing from his former LVAD site (thought to be a contaminant, but treated with IV vanco --> po doxy through 6/1/20, 14d course total), and in the setting of donor blood growing S anginosus and Veillonella (also thought to be a contaminant, but treated with Vanco/zosyn --> Vanco/Flagyl for a total of 7 days).  He also had hyperkalemia, which improved following kayexalate and stopping bactrim.      Rejection history:  none  AlloMap scores:  < 35  DSAs:  none  Coronary angio/Ischemic eval: Coronary angiogram 6/2021 showed normal coronary arteries with no angiographic evidence of obstruction.  Last RHC:  10/2021 showed normal biventricular filling pressures, with RA 4, mPA 20, PCW 14, and CI 2.7.  Echo/cMRI: TTE 2/2022 showed stable graft function, with LVEF 55-60% and normal RV size/function.     Serostatus:  - CMV D+/R-  - EBV D-/R+  - Toxo D-/R-      PPx:  - CAV:  Continue rosuvastatin 10mg daily.  - GI:  Pantoprazole 40mg daily  - Osteoporosis:  Calcium/vitamin d supplements     Graft function:  - BPs:  Controlled, continue amlodipine 5mg daily and hydralazine 100mg TID  - fluid status:  Euvolemic, will review torsemide dosing after RHC today -- currently taking prn, has taken 2 doses in the last 2 days     Health maintenance:  - derm exam overdue  - dental exam overdue  - eye exam UTD, has developing cataracts  - COVID shots   - pneumonia shots -- advised that he discuss with PCP  - following with urology     Post-obstructive versus aspiration pneumonia  RUL/suprahilar mass    +abiotrophia defectiva  He was readmitted 8/31/20-9/24/20 with fever, cough, diarrhea, and syncope.  He was found to have a post-obstructive vs aspiration pneumonia, RUL/suprahilar mass, and narrowing of multiple RUL bronchi.  He was initially treated as a fungal infection as his fungitelle was +, but he did not clinically improve, nor did the size of the mass change.  Ultimately, tissue culture from the RUL mass showed abiotrophia defectiva (oral microbe), which was thought to be a contaminant, but did respond to antibiotics.  His MMF was decreased to 500mg twice daily, as ImmuKnow was noted to be low.  He completed 4 weeks of antibiotic therapy 10/11/20, and follow up chest CTs have shown improvement in the RUL infiltrate.     DMII  HgbA1c remains elevated, advised that he follow-up with endo.  He has started Farxiga.     Elevated PSA  Prostate MRI 5/2020 showed signs of BPH, he follows with urology.     Possible chronic prostatits  Treated with antibiotics for > 1 month between 8/2020-10/2020.    I appreciate the opportunity to participate in the care of Mariusz Sharp . Please do not hesitate to contact me with any further questions.  I have spent a total of 40 minutes today reviewing labs, imaging studies, discussing with colleagues, face-to-face time with patient and documentation in the medical record.  The longitudinal plan of care for the diagnosis(es)/condition(s) as documented were addressed during this visit. Due to the added complexity in care, I will continue to support Red in the subsequent management and with ongoing continuity of care.    Sincerely,   Gerardo Stone MD  [facility] ER Division of Cardiology         Please do not hesitate to contact me if you have any questions/concerns.     Sincerely,     Gerardo Stone MD

## 2025-07-15 NOTE — NURSING NOTE
"Transplant Coordinator Note    Reason for visit: 5th annual  Coordinator: Present   Caregiver:  none today    Health concerns addressed today:  1. Has rare feeling of heart beating \"hard\".  Ziopatch applied for 14 days.  2. Per pt \"Urology never set up any visits for follow up\".  New Urology referral sent- for elevated PSA.  3. Has been dealing with some ED- ok for meds to correct this if PCP prescribes.      Immunosuppressants:  MMF 1250 BID  Everolimus 1.5 BID Goal 6-8      Routine screenings:    Derm: UTD  Dental: Due  Colonoscopy: following with PCP- due soon per pt.  Breast/Prostate: PSA 16.81- follows with Urology  Eye: UTD  Flu/Pneumonia: Pneumonia- done, Flu and Covid this Fall, needs Shingrix    Labs:       Additional Notes:         Meds, labs reviewed with patient  Medication record reviewed and reconciled  Questions and concerns addressed  Pt verbalized an understanding of plan of care.     Patient Instructions:  1. Please check your BP for about a week, and send the results to Angelika.  2. OK to take drugs for ED if your primary MD would to prescribe.  3. Angelika will call or Mychart with any remaining results.    Next transplant clinic appointment: in one year for 6th annual with angiogram.   Next lab draw: in 6 months or sooner if indicated.  Coordinator will call with all pending results.     Please call transplant coordinator with any questions:    Angelika Muller RN BSN   Post Heart Transplant Nurse Coordinator  [facility]  Questions: [phone]     "

## 2025-07-15 NOTE — PROGRESS NOTES
Pt here for dobutamine stress test. Test, meds and side effects reviewed with patient. Achieved target HR at 20 mcg Dobutamine and a total of 0 mg IV atropine. Gave a total of 3 mg IV Metoprolol to bring HR back to baseline. Post monitoring complete and VSS. Pt escorted out to the gold waiting room.

## 2025-07-15 NOTE — NURSING NOTE
Chief Complaint   Patient presents with    Follow Up     RETURN HEART TRANSPLANT       Vitals were taken, medications reconciled.    Sarah García, EMT    11:52 AM

## 2025-07-15 NOTE — PATIENT INSTRUCTIONS
Patient Instructions:  1. Please check your BP for about a week, and send the results to Angelika.  2. OK to take drugs for ED if your primary MD would to prescribe.  3. Angelika will call or Mychart with any remaining results.    Next transplant clinic appointment: in one year for 6th annual with angiogram.   Next lab draw: in 6 months or sooner if indicated.  Coordinator will call with all pending results.     Please call transplant coordinator with any questions:    Angelika Muller RN BSN   Post Heart Transplant Nurse Coordinator  [facility]  Questions: [phone]

## 2025-07-15 NOTE — PROGRESS NOTES
"July 15, 2025      \"Red\"Aron is a 64yr old male with a history of CAD (STEMI with ALYSSA to LAD 6/27/18), ICM (LVEF 20-25%, s/p HM3 LVAD 12/31/18) s/p OHT 5/18/20, monomorphic VT (s/p ICD with shocks), LV thrombus, CKD, elevated PSA, pAF, HTN, HL, and DMII who presents to clinic for routine follow up.   His post-transplant course was c/b NSVT (with no hemodynamic compromise), leukocytosis with C Acnes growing from his former LVAD site (thought to be a contaminant, but treated with IV vanco --> po doxy through 6/1/20, 14d course total), and in the setting of donor blood growing S anginosus and Veillonella (also thought to be a contaminant, but treated with Vanco/zosyn --> Vanco/Flagyl for a total of 7 days).  He also had hyperkalemia, which improved following kayexalate and stopping bactrim.  He ultimately discharged 5/29/20.     He was readmitted 8/31-9/24/20 with fever, cough, diarrhea, and syncope.  He was found to have a post-obstructive vs aspiration pneumonia, RUL/suprahilar mass, and narrowing of multiple RUL bronchi.  He was initially treated as a fungal infection as his fungitelle was +, but he did not clinically improve, nor did the size of the mass change.  Ultimately, tissue culture from the RUL mass showed abiotrophia defectiva (oral microbe), which was thought to be a contaminant, but did respond to antibiotics.  His MMF was decreased to 500mg twice daily now back 1200mg BID     Overall doing very well denies any new complaints or issues.  He has been waiting for the urology team to contact him as well as for potential kidney stone removal however these have not happened yet.  Otherwise takes all medications as prescribed no new dizziness lightheadedness palpitations fevers chills or any concerns.  Blood pressure at home is higher today it was a little bit lower however here.    Rejection history:  none  AlloMap scores:  < 35  DSAs:  none  Coronary angio/Ischemic eval: Coronary angiogram 6/2021 " showed normal coronary arteries with no angiographic evidence of obstruction.  Last RHC:  10/2021 showed normal biventricular filling pressures, with RA 4, mPA 20, PCW 14, and CI 2.7.  Echo/cMRI: TTE 2/2022 showed stable graft function, with LVEF 55-60% and normal RV size/function.     Overall he has been doing well and denies any new complaints.  He did just come from the Cath Lab and the procedure went well.  Denies any dizziness lightheadedness palpitations chest pain lightheadedness or syncope.  No worsening shortness of breath.  Doing well overall.     Serostatus:  CMV D+/R-  EBV D-/R+  Toxo D-/R-      PAST MEDICAL HISTORY:  Past Medical History:   Diagnosis Date    Acute kidney injury     CKD (chronic kidney disease)     Coronary artery disease     Diabetes mellitus (H)     HTN (hypertension)     Hyperlipidemia     ICD (implantable cardioverter-defibrillator), single chamber- NOT dependent 7/17/2018    Ischemic cardiomyopathy     LV (left ventricular) mural thrombus     Paroxysmal atrial flutter (H)     RVF (right ventricular failure) (H)     Systolic heart failure (H)     Ventricular tachycardia (H)      FAMILY HISTORY:  No family history on file.  SOCIAL HISTORY:  Social History     Socioeconomic History    Marital status: Single   Tobacco Use    Smoking status: Never    Smokeless tobacco: Never   Vaping Use    Vaping status: Never Used   Substance and Sexual Activity    Alcohol use: No    Drug use: No   Other Topics Concern    Parent/sibling w/ CABG, MI or angioplasty before 65F 55M? No     CURRENT MEDICATIONS:  Current Outpatient Medications   Medication Sig Dispense Refill    ACCU-CHEK [place] PLUS test strip Check BG 3 times daily at meals and at bedtime. 400 strip 3    acetaminophen (TYLENOL) 325 MG tablet Take 2 tablets (650 mg) by mouth every 4 hours as needed for other (multimodal surgical pain management along with NSAIDS and opioid medication as indicated based on pain control and physical function.)       amLODIPine (NORVASC) 10 MG tablet Take 1 tablet (10 mg) by mouth daily. 90 tablet 3    aspirin (ASA) 81 MG EC tablet Take 1 tablet (81 mg) by mouth daily 30 tablet 11    BD SILVANA U/F 32G X 4 MM insulin pen needle Use 6 times daily. 600 each 3    calcium carbonate 600 mg-vitamin D 400 units (CALTRATE) 600-400 MG-UNIT per tablet Take 1 tablet by mouth 2 times daily (with meals)      Continuous Glucose Sensor (FREESTYLE DARRELL 2 PLUS SENSOR) MISC 1 each See Admin Instructions. Use as directed per 's instructions to monitor blood sugars continuously. Change sensor every 15 days. 6 each 5    dapagliflozin (FARXIGA) 10 MG TABS tablet Take 1 tablet (10 mg) by mouth daily. 90 tablet 3    everolimus (ZORTRESS) 0.5 MG tablet TAKE 3 TABLETS BY MOUTH EVERY MORNING AND 3 TABLETS EVERY EVENING 540 tablet 3    HUMALOG KWIKPEN 100 UNIT/ML soln INJECT 1 UNIT PER EVERY 8 GRAMS OF CARBS WITH MEALS AND SNACKS, PLUS CORRECTION, APPROX 70 UNITS IN 24 HOURS. Follow up visit needed. Call  to schedule the first available visit. 75 mL 5    insulin glargine (LANTUS PEN) 100 UNIT/ML pen Inject 34 units subcutaneous at hs. May decrease by 2 units every 3 days if morning fasting sugar consistently < 100. Max daily dose = 34 units 15 mL 3    loperamide (IMODIUM) 2 MG capsule Take 1 capsule (2 mg) by mouth 4 times daily as needed for diarrhea 60 capsule 0    losartan (COZAAR) 25 MG tablet TAKE 2 TABLETS(50 MG) BY MOUTH DAILY 180 tablet 3    magnesium oxide (MAG-OX) 400 (241.3 Mg) MG tablet Take 1 tablet (400 mg) by mouth daily 90 tablet 3    multivitamin w/minerals (THERA-VIT-M) tablet Take 1 tablet by mouth daily 90 tablet 3    mycophenolate (GENERIC EQUIVALENT) 250 MG capsule Take 5 capsules (1,250 mg) by mouth 2 times daily. 300 capsule 11    pantoprazole (PROTONIX) 40 MG EC tablet Take 1 tablet (40 mg) by mouth every morning (before breakfast). 90 tablet 3    rosuvastatin (CRESTOR) 20 MG tablet Take 1 tablet (20 mg)  by mouth daily. 90 tablet 3    tirzepatide (MOUNJARO) 12.5 MG/0.5ML SOAJ auto-injector pen Inject 0.5 mLs (12.5 mg) subcutaneously every 7 days. 6 mL 5    torsemide (DEMADEX) 20 MG tablet Take 1 tablet (20 mg) by mouth daily 90 tablet 3     No current facility-administered medications for this visit.     EXAM:  /70 (BP Location: Right arm, Patient Position: Chair, Cuff Size: Adult Regular)   Pulse 118   Wt 80.6 kg (177 lb 12.8 oz)   SpO2 97%   BMI 30.52 kg/m    In general, the patient is a pleasant male in no apparent distress.    HEENT: NC/AT. PERRLA. EOMI.  Sclerae white, not injected.    Neck:  No adenopathy, No thyromegaly.    COR: JVD at clavicle when sitting upright.  RRR.  Normal S1 S2 splits physiologically.  No murmur, rub click, or gallop.    Lungs:  CTA. No rhonchi.    Abdomen: soft, nontender, nondistended.  No organomegaly.  Extremities:  No clubbing, cyanosis, or LE edema.    Neuro: Alert & Oriented x 3, grossly non focal.  Integument: no open lesions, rashes, or jaundice.     Labs:  Lab Results   Component Value Date    WBC 5.8 06/07/2024    HGB 15.7 06/07/2024    HCT 48.1 06/07/2024     06/07/2024     06/07/2024    POTASSIUM 4.1 06/07/2024    CHLORIDE 108 (H) 06/20/2024    CO2 23 06/07/2024    BUN 23.2 (H) 06/07/2024    CR 1.47 (H) 06/07/2024     (H) 06/07/2024    DD 2.0 (H) 06/04/2020    NTBNPI 1,673 (H) 08/18/2020    NTBNP 404 (H) 06/26/2019    TROPI <0.015 09/17/2020    AST 30 06/07/2024    ALT 28 06/07/2024    ALKPHOS 76 06/07/2024    BILITOTAL 1.0 06/07/2024    INR 1.06 08/26/2020     RHC 10/26/21:  RA: --/--/6  RV: 27/6  PA: 25/18/22  PCWP: --/--/17  PA Sat: 62.3%  Hb: 11  TD CO/CI: 5/2.62  AMRIT CO/CI: 5.15/2.7  PVR: 1.0 (AMRIT)  SVR 1430      TTE 2/23/22:  Global and regional left ventricular function is normal with an EF of 55-60%.  Right ventricular function, chamber size, wall motion, and thickness are normal.  Pulmonary artery systolic pressure cannot be  "assessed. The inferior vena cava is normal.  No significant changes noted.      TTE 5/18/22:  Left ventricular function is decreased. The ejection fraction is 40-45% (mildly reduced).  Global right ventricular function is mildly reduced.  No significant valvular abnormalities.  No pericardial effusion is present.  This study was compared with the study from 2/23/22: While comparison with the prior study is challenging due to poor image quality on the previous images, LV and RV function appear to have decreased slightly.     Coronary angiogram/RHC 5/18/22:  NOrmal coroanries  RA 7  RV 31/8  PA 31/18/24  PCWP 17  Cardiac output by William: 7.20 L/min (indexed to 3.6 L/min/m2)  PVR 1.0      RHC 6/2024:  BP  124/69/91 mmHg  RA mean of 6 mmHg and V wave of 7 mmHg  RV 24/6 mmHg  PA 24/15/18 mmHg  PCWP 13 mmHg and V wave of 15 mmHg  TD CO/CI 5.8/3.0   William CO/CI 4.6/2.4  PVR 0.86 LERMA  SVR 1068 dynes/sec/cm-5  PA sat 70%   Hgb 14 g/dL     Assessment and plan:   \"Red\" Aron is a 64yr old male with a history of CAD (STEMI with ALYSSA to LAD 6/27/18), ICM (LVEF 20-25%, s/p HM3 LVAD 12/31/18) s/p OHT 5/18/20, monomorphic VT (s/p ICD with shocks), LV thrombus, CKD, elevated PSA, pAF, HTN, HL, and DMII who presents to clinic for routine follow up.  Overall testing have been reviewed that is available at the moment and everything looks well.  Most recent ejection fraction was around 50% repeat are pending.  Today he is having a stress echo which we will review.  Also labs pending including EBV CMV.  Renal function is a little bit elevated around 1.88 which is similar range it had been in the past.  No medication changes are needed today we will continue.  He noted very infrequent episodes of palpitations or at least feeling some thumping sensation and therefore we are going to do a Zio patch monitor for 14 days.  No other changes we will see him back per protocol.     ICM, s/p OHT 5/18/20  His post-transplant course was c/b NSVT " (with no hemodynamic compromise), leukocytosis with C Acnes growing from his former LVAD site (thought to be a contaminant, but treated with IV vanco --> po doxy through 6/1/20, 14d course total), and in the setting of donor blood growing S anginosus and Veillonella (also thought to be a contaminant, but treated with Vanco/zosyn --> Vanco/Flagyl for a total of 7 days).  He also had hyperkalemia, which improved following kayexalate and stopping bactrim.      Rejection history:  none  AlloMap scores:  < 35  DSAs:  none  Coronary angio/Ischemic eval: Coronary angiogram 6/2021 showed normal coronary arteries with no angiographic evidence of obstruction.  Last RHC:  10/2021 showed normal biventricular filling pressures, with RA 4, mPA 20, PCW 14, and CI 2.7.  Echo/cMRI: TTE 2/2022 showed stable graft function, with LVEF 55-60% and normal RV size/function.     Serostatus:  - CMV D+/R-  - EBV D-/R+  - Toxo D-/R-      PPx:  - CAV:  Continue rosuvastatin 10mg daily.  - GI:  Pantoprazole 40mg daily  - Osteoporosis:  Calcium/vitamin d supplements     Graft function:  - BPs:  Controlled, continue amlodipine 5mg daily and hydralazine 100mg TID  - fluid status:  Euvolemic, will review torsemide dosing after RHC today -- currently taking prn, has taken 2 doses in the last 2 days     Health maintenance:  - derm exam overdue  - dental exam overdue  - eye exam UTD, has developing cataracts  - COVID shots   - pneumonia shots -- advised that he discuss with PCP  - following with urology     Post-obstructive versus aspiration pneumonia  RUL/suprahilar mass   +abiotrophia defectiva  He was readmitted 8/31/20-9/24/20 with fever, cough, diarrhea, and syncope.  He was found to have a post-obstructive vs aspiration pneumonia, RUL/suprahilar mass, and narrowing of multiple RUL bronchi.  He was initially treated as a fungal infection as his fungitelle was +, but he did not clinically improve, nor did the size of the mass change.  Ultimately,  tissue culture from the RUL mass showed abiotrophia defectiva (oral microbe), which was thought to be a contaminant, but did respond to antibiotics.  His MMF was decreased to 500mg twice daily, as ImmuKnow was noted to be low.  He completed 4 weeks of antibiotic therapy 10/11/20, and follow up chest CTs have shown improvement in the RUL infiltrate.     DMII  HgbA1c remains elevated, advised that he follow-up with endo.  He has started Farxiga.     Elevated PSA  Prostate MRI 5/2020 showed signs of BPH, he follows with urology.     Possible chronic prostatits  Treated with antibiotics for > 1 month between 8/2020-10/2020.    I appreciate the opportunity to participate in the care of Mariusz Sharp . Please do not hesitate to contact me with any further questions.  I have spent a total of 40 minutes today reviewing labs, imaging studies, discussing with colleagues, face-to-face time with patient and documentation in the medical record.  The longitudinal plan of care for the diagnosis(es)/condition(s) as documented were addressed during this visit. Due to the added complexity in care, I will continue to support Red in the subsequent management and with ongoing continuity of care.    Sincerely,   Gerardo Stone MD  [facility] Division of Cardiology

## 2025-07-15 NOTE — NURSING NOTE
Mariusz Sharp arrived here on 7/15/2025 12:18 PM for 8-14 Days  Zio monitor placement per ordering provider Dr. Stone for the diagnosis Heart replaced by transplant (H) [Z94.1].  Dr. Khoury is the supervising MD. Patient s skin was prepped per protocol.  Zio monitor was placed.  Instructions were reviewed with and given to the patient.  Patient verbalized understanding of wear, troubleshooting and monitor return instructions.  KULWINDER ANTOINE, Visit Facilitator on 7/15/2025 at 12:20 PM

## 2025-07-16 ENCOUNTER — PATIENT OUTREACH (OUTPATIENT)
Dept: CARE COORDINATION | Facility: CLINIC | Age: 65
End: 2025-07-16
Payer: COMMERCIAL

## 2025-07-17 LAB
EVEROLIMUS BLD-MCNC: 7.4 NG/ML
IMMUKNOW IMMUNE CELL FUNCTION: 204 NG/ML

## 2025-07-19 ENCOUNTER — HEALTH MAINTENANCE LETTER (OUTPATIENT)
Age: 65
End: 2025-07-19

## 2025-07-19 NOTE — TELEPHONE ENCOUNTER
MEDICAL RECORDS REQUEST   [facility]  Urology Clinic  [address] [place] [address]  PHONE: [phone]  Fax: [phone]        FUTURE VISIT INFORMATION                                                   Mariusz Sharp, : 1960 scheduled for future visit at [facility]    APPOINTMENT INFORMATION:  Date: 2025  Provider:  Chip Carlos PA-C  Reason for Visit/Diagnosis: Elevated PSA    REFERRAL INFORMATION:  Referring provider:  Gerardo Stone MD in  [facility]      RECORDS REQUESTED FOR VISIT                                                     NOTES  STATUS/DETAILS   OFFICE NOTE from referring provider  07/15/2025-- Gerardo Stone MD in  [facility]   OFFICE NOTE from other specialist  yes   MEDICATION LIST  yes   LABS     PSA  yes     PRE-VISIT CHECKLIST      Joint diagnostic appointment coordinated correctly          (ensure right order & amount of time) Yes   RECORD COLLECTION COMPLETE Yes

## 2025-07-21 ENCOUNTER — MYC MEDICAL ADVICE (OUTPATIENT)
Dept: OTHER | Age: 65
End: 2025-07-21

## 2025-07-21 ENCOUNTER — TELEPHONE (OUTPATIENT)
Dept: TRANSPLANT | Facility: CLINIC | Age: 65
End: 2025-07-21
Payer: COMMERCIAL

## 2025-07-21 NOTE — TELEPHONE ENCOUNTER
Pt called regarding Mychart message.  BPs are a little elevated, but no need to increase BP medications at this time.  BPs post meds are WNL.  Also reviewed results of DSE with pt.  EF is slightly decreased 40-45%.  Reviewed with Dr. Stone.  Plan for a repeat echo in 2 weeks to re-assess.  Order faxed to [facility] in [facility] [phone].  They will reach out to pt to schedule.  Pt states understanding plan and instructions.

## 2025-07-21 NOTE — LETTER
2025      Patient Name:  Mariusz Sharp  :  1960  MRN:  [id number]   ICD10: z94.1, z79.899    Subject:  Vanita Sharp is a heart transplant patient currently being followed by the [facility]. We would like to request your assistance in obtaining the following tests for the convenience of our mutual patient.    Echocardiogram Complete    Please send this information to:      [facility]   Thoracic Transplant Department      Fax Number:  [phone]    Thank you  for your continued support and the opportunity to collaborate in the care of this patient.  If you have any questions, please call the Thoracic Transplant Team at [phone] or [phone].        Dr. Gerardo Stone  [facility] Division of Cardiology

## 2025-07-22 DIAGNOSIS — Z94.1 HEART REPLACED BY TRANSPLANT (H): Primary | ICD-10-CM

## 2025-08-02 LAB — CV ZIO PRELIM RESULTS: NORMAL

## 2025-08-04 ENCOUNTER — HOSPITAL ENCOUNTER (OUTPATIENT)
Dept: CARDIOLOGY | Facility: CLINIC | Age: 65
Discharge: HOME | End: 2025-08-04
Attending: INTERNAL MEDICINE | Admitting: INTERNAL MEDICINE
Payer: COMMERCIAL

## 2025-08-04 DIAGNOSIS — Z94.1 HEART REPLACED BY TRANSPLANT (H): ICD-10-CM

## 2025-08-04 DIAGNOSIS — Z94.1 STATUS POST HEART TRANSPLANTATION (H): ICD-10-CM

## 2025-08-04 LAB — BI-PLANE LVEF ECHO: NORMAL

## 2025-08-04 PROCEDURE — 93306 TTE W/DOPPLER COMPLETE: CPT | Mod: 26 | Performed by: INTERNAL MEDICINE

## 2025-08-04 PROCEDURE — 999N000208 ECHOCARDIOGRAM COMPLETE

## 2025-08-04 PROCEDURE — 255N000002 HC RX 255 OP 636: Performed by: INTERNAL MEDICINE

## 2025-08-04 RX ADMIN — HUMAN ALBUMIN MICROSPHERES AND PERFLUTREN 5 ML (DILUTED): 10; .22 INJECTION, SOLUTION INTRAVENOUS at 11:17

## 2025-09-16 ENCOUNTER — PRE VISIT (OUTPATIENT)
Dept: UROLOGY | Facility: CLINIC | Age: 65
End: 2025-09-16

## (undated) DEVICE — GLOVE PROTEXIS POWDER FREE 7.0 ORTHOPEDIC 2D73ET70

## (undated) DEVICE — KIT CONNECTOR FOR OLYMPUS ENDOSCOPES DEFENDO 100310

## (undated) DEVICE — BASIN SET SINGLE STERILE 13752-624

## (undated) DEVICE — PACK HEART RIGHT CUSTOM SAN32RHF18

## (undated) DEVICE — FORCEP ENDOMYOCARDIAL BIOPSY STRAIGHT 6FRX50CM 190060

## (undated) DEVICE — Device

## (undated) DEVICE — INTRO SHEATH 7FRX10CM PINNACLE RSS702

## (undated) DEVICE — WIPE PREMOIST CLEANSING WASHCLOTHS 7988

## (undated) DEVICE — SOL NACL 0.9% IRRIG 3000ML BAG 2B7477

## (undated) DEVICE — PUNCH AORTIC 5.0MM LONG AP-550

## (undated) DEVICE — LEAD PACER MYOCARDIAL BIPOLAR TEMPORARY 53CM 6495F

## (undated) DEVICE — GUIDEWIRE L80CM OD.035IN 3 CM J-TIP V

## (undated) DEVICE — PROTECTOR ARM ONE-STEP TRENDELENBURG 40418

## (undated) DEVICE — PACK HEART LEFT CUSTOM

## (undated) DEVICE — CONNECTOR DRAIN CHEST Y EXTENSION SET 19909

## (undated) DEVICE — SU PROLENE 3-0 SHDA 48" 8534H

## (undated) DEVICE — KIT ENDO FIRST STEP DISINFECTANT 200ML W/POUCH EP-4

## (undated) DEVICE — KIT RIGHT HEART CATH 60130719

## (undated) DEVICE — TUBING INSUFFLATION W/FILTER CPC TO LUER 620-030-301

## (undated) DEVICE — SU ETHIBOND 2-0 SHDA 30" X563H

## (undated) DEVICE — MANIFOLD KIT ANGIO AUTOMATED 014613

## (undated) DEVICE — TAPE CLOTH 3" CARDINAL 3TRCL03

## (undated) DEVICE — INTRODUCER SHEATH FAST-CATH 7FRX40CM GSPC CVD 406772

## (undated) DEVICE — SUCTION DRY CHEST DRAIN OASIS 3600-100

## (undated) DEVICE — SU PROLENE 4-0 SHDA 36" 8521H

## (undated) DEVICE — DRAPE WARMER 66X44" ORS-300

## (undated) DEVICE — BLADE SAW STERNAL 20X30MM KM-32

## (undated) DEVICE — ADH SKIN CLOSURE PREMIERPRO EXOFIN 1.0ML 3470

## (undated) DEVICE — SOL NACL 0.9% IRRIG 1000ML BOTTLE 2F7124

## (undated) DEVICE — SLEEVE TR BAND RADIAL COMPRESSION DEVICE 24CM TRB24-REG

## (undated) DEVICE — THORATEC HEARTMATE 3 SYSTEM CONTROLLER

## (undated) DEVICE — INTRO GLIDESHEATH SLENDER 6FR 10X45CM 60-1060

## (undated) DEVICE — FASTENER CATH BALLOON CLAMPX2 STATLOCK 0684-00-493

## (undated) DEVICE — DRAIN CHEST TUBE RIGHT ANGLED 28FR 8128

## (undated) DEVICE — DEFIB PRO-PADZ LVP LQD GEL ADULT 8900-2105-01

## (undated) DEVICE — GUIDEWIRE ROSEN CVD .035X260CM G01253 THSCF-35-260-1.5-ROSEN

## (undated) DEVICE — SU PROLENE 4-0 RB-1DA 36" 8557H

## (undated) DEVICE — DRAPE BACK TABLE  44X90" 8377

## (undated) DEVICE — PREP CHLORHEXIDINE 4% 4OZ (HIBICLENS) 57504

## (undated) DEVICE — SU VICRYL 0 CT-1 27" UND J260H

## (undated) DEVICE — DRSG ABDOMINAL 07 1/2X8" 7197D

## (undated) DEVICE — INTRO SHEATH AVANTI 4FRX23CM 504604T

## (undated) DEVICE — ESU BIPOLAR SEALER AQUAMANTYS 6MM 23-112-1

## (undated) DEVICE — WIPES FOLEY CARE SURESTEP PROVON DFC100

## (undated) DEVICE — ENDO VALVE BX EVIS MAJ-210

## (undated) DEVICE — SU PROLENE 5-0 RB-2DA 30" 8710H

## (undated) DEVICE — DRAIN JACKSON PRATT ROUND SIL 19FR W/TROCAR LF JP-2232

## (undated) DEVICE — ENDO TUBING CO2 SMARTCAP STERILE DISP 100145CO2EXT

## (undated) DEVICE — INTRO SHEATH MICRO PLATINUM TIP 4FRX40CM 7274

## (undated) DEVICE — CATH ANGIO SUPERTORQUE PLUS JR4 6FRX100CM 533621

## (undated) DEVICE — CATH ANGIO INFINITI JL4 4FRX100CM 538420

## (undated) DEVICE — SLEEVE REPOSITIONING W/CATH LOCK 60CM 406503

## (undated) DEVICE — CONNECTOR BLAKE DRAIN SGL BCC1

## (undated) DEVICE — SU PROLENE 3-0 MH 36" 8842H

## (undated) DEVICE — SOL WATER IRRIG 1000ML BOTTLE 2F7114

## (undated) DEVICE — ESU HOLSTER PLASTIC DISP E2400

## (undated) DEVICE — PACK ENDOSCOPY GI CUSTOM UMMC

## (undated) DEVICE — LINEN TOWEL PACK X30 5481

## (undated) DEVICE — ENDO CAP AND TUBING STERILE FOR ENDOGATOR  100130

## (undated) DEVICE — ESU ELEC BLADE 6" COATED E1450-6

## (undated) DEVICE — ENDO SNARE EXACTO COLD 9MM LOOP 2.4MMX230CM 00711115

## (undated) DEVICE — WIRE GUIDE 0.025"X150CM EMERALD J TIP 502524

## (undated) DEVICE — SU VICRYL 0 CTX 36" J370H

## (undated) DEVICE — SU ETHIBOND 3-0 BBDA 36" X588H

## (undated) DEVICE — ESU ELEC BLADE 2.75" COATED/INSULATED E1455

## (undated) DEVICE — LINEN TOWEL PACK X6 WHITE 5487

## (undated) DEVICE — SYR 30ML SLIP TIP W/O NDL 302833

## (undated) DEVICE — TAPE MEDIPORE 4"X2YD 2864

## (undated) DEVICE — INTRODUCER SHEATH FAST-CATH 7FRX12CM 406244

## (undated) DEVICE — SU VICRYL 3-0 FS-1 27" J442H

## (undated) DEVICE — CATH ANGIO 6FR JL3.5 100CM ST+

## (undated) DEVICE — KIT RIGHT HEART CATH H965601307191

## (undated) DEVICE — SU PROLENE 3-0 SHDA 36" 8522H

## (undated) DEVICE — KIT HAND CONTROL ACIST 014644 AR-P54

## (undated) DEVICE — DRAPE SLUSH/WARMER 66X44" ORS-320

## (undated) DEVICE — DRSG DRAIN 4X4" 7086

## (undated) DEVICE — LINEN GOWN XLG 5407

## (undated) DEVICE — SPONGE RAY-TEC 4X8" 7318

## (undated) DEVICE — DECANTER BAG 2002S

## (undated) DEVICE — DRSG TEGADERM 8X12" 1629

## (undated) DEVICE — SU ETHIBOND 2-0 MHDA 36" X843H

## (undated) DEVICE — SU PLEDGET SOFT TFE 13MMX7MMX1.5MM D7044

## (undated) DEVICE — SOL NACL 0.9% 10ML VIAL 0409-4888-02

## (undated) DEVICE — DRAIN CHEST TUBE 28FR STR 8028

## (undated) DEVICE — CLIP HEMOCLIP ENDOSCOPIC INSTINCT 2.8X230CM INSC-7-230-SS

## (undated) DEVICE — BLADE SAW SAGITTAL STERNOTOMY CV SM LINVATEC 5023-187

## (undated) DEVICE — GW VASC .035IN DIA 260CML 7CML 3 MM RADIUS J CURVE 502455

## (undated) DEVICE — SU PLEDGET SOFT TFE 3/8"X3/26"X1/16" PCP40

## (undated) DEVICE — DRSG PRIMAPORE 02X3" 7133

## (undated) DEVICE — SUCTION CATH AIRLIFE TRI-FLO W/CONTROL PORT 14FR  T60C

## (undated) DEVICE — INTRODUCER SHEATH FAST-CATH 7FRX85CM GSPC CVD 406772

## (undated) DEVICE — ESU PENCIL W/ROCKER SWITCH BLADE HOLSTER E2350HDB

## (undated) DEVICE — SHTH INTRO 0.021IN ID 6FR DIA

## (undated) DEVICE — GLOVE SENSICARE 7.5 MSG1075 LATEX FREE

## (undated) DEVICE — SU STEEL 6 CCS 4X18" M654G

## (undated) DEVICE — NDL COUNTER 20CT 31142493

## (undated) DEVICE — TIES BANDING T50R

## (undated) DEVICE — DRAIN CHEST TUBE 36FR STR 8036

## (undated) DEVICE — SU SILK 0 TIE 6X30" A306H

## (undated) DEVICE — INTRO SHEATH 7FRX25CM PINNACLE RSS706

## (undated) DEVICE — 30IN (76CM) EXCITE FLEXIBLE, CLEAR NYLON/POLYURETHANE CO-EXTRUDED CONTRAST INJECTION TUBING, FIXED MALE CONNECTOR

## (undated) DEVICE — PITCHER STERILE 1000ML  SSK9004A

## (undated) DEVICE — CATH ANGIO SUPERTORQUE PLUS JL4 6FRX100CM 533620

## (undated) DEVICE — INTRODUCER SHEATH FAST-CATH CATH-LOCK 7FRX12CM 406702

## (undated) DEVICE — SU DERMABOND ADVANCED .7ML DNX12

## (undated) DEVICE — TUBING PRESSURE 30"

## (undated) DEVICE — SUCTION MANIFOLD DORNOCH ULTRA CART UL-CL500

## (undated) DEVICE — ENDO VALVE SYR NDL KIT ULTRASOUND BRONCH NA-201SX-4022-A

## (undated) DEVICE — ENDO SUCTION TRAP POLYP 4 CHAMBER H334

## (undated) DEVICE — SU VICRYL 2-0 CT-1 27" UND J259H

## (undated) DEVICE — DRAPE IOBAN INCISE 23X17" 6650EZ

## (undated) DEVICE — INTRODUCER SHEATH 4FRX40CM MICROPUNC PED G47946

## (undated) DEVICE — SOL WATER IRRIG 1000ML BOTTLE 07139-09

## (undated) DEVICE — PACK ADULT HEART UMMC PV15CG92D

## (undated) DEVICE — KIT HAND CONTROL ACIST 016795

## (undated) DEVICE — DRSG GAUZE 4X4" TRAY 6939

## (undated) DEVICE — CATH ANGIO INFINITI 3DRC 4FRX100CM 538476

## (undated) DEVICE — ENDO ADPT BRONCH SWIVEL Y A1002

## (undated) DEVICE — SU STEEL MYO/WIRE II STERNOTOMY 8 BE-1 3X14" 048-217

## (undated) DEVICE — DRSG TELFA 3X8" 1238

## (undated) DEVICE — ENDO VALVE SUCTION BRONCH EVIS MAJ-209

## (undated) DEVICE — SU ETHIBOND 0 TIE 6X30" X306H

## (undated) DEVICE — NDL ANGIOCATH 14GA 1.25" 4048

## (undated) DEVICE — THORATEC HEARTMATE 3 VAD MODULAR CABLE

## (undated) DEVICE — DRAIN MEDIASTINAL 9MM 14609

## (undated) DEVICE — SU PROLENE 6-0 C-1DA 30" 8706H

## (undated) DEVICE — SU ETHIBOND 0 CT-1 CR 8X18" CX21D

## (undated) DEVICE — PREP CHLORAPREP 26ML TINTED ORANGE  260815

## (undated) DEVICE — TUBING SUCTION 10'X3/16" N510

## (undated) DEVICE — BONE WAX 2.5GM W31G

## (undated) DEVICE — PACKING IODOFORM STRIP 1/2" 7832

## (undated) DEVICE — SOL NACL 0.9% INJ 1000ML BAG 2B1324X

## (undated) DEVICE — LUBRICANT INST KIT ENDO-LUBE 220-90

## (undated) DEVICE — PAD CHUX UNDERPAD 23X24" 7136

## (undated) DEVICE — FORCEP ENDMYCRD BX DISP STR 6F

## (undated) DEVICE — BLADE CLIPPER SGL USE 9680

## (undated) DEVICE — TOURNIQUET VASCULAR KIT ARGYLE 8888-585000

## (undated) RX ORDER — LIDOCAINE 40 MG/G
CREAM TOPICAL
Status: DISPENSED
Start: 2018-11-13

## (undated) RX ORDER — POTASSIUM CHLORIDE 750 MG/1
TABLET, EXTENDED RELEASE ORAL
Status: DISPENSED
Start: 2022-05-18

## (undated) RX ORDER — LIDOCAINE 40 MG/G
CREAM TOPICAL
Status: DISPENSED
Start: 2021-01-05

## (undated) RX ORDER — HEPARIN SODIUM 1000 [USP'U]/ML
INJECTION, SOLUTION INTRAVENOUS; SUBCUTANEOUS
Status: DISPENSED
Start: 2020-05-18

## (undated) RX ORDER — CEFAZOLIN SODIUM 2 G/100ML
INJECTION, SOLUTION INTRAVENOUS
Status: DISPENSED
Start: 2020-09-17

## (undated) RX ORDER — HYDRALAZINE HYDROCHLORIDE 20 MG/ML
INJECTION INTRAMUSCULAR; INTRAVENOUS
Status: DISPENSED
Start: 2020-09-17

## (undated) RX ORDER — LIDOCAINE 40 MG/G
CREAM TOPICAL
Status: DISPENSED
Start: 2020-06-30

## (undated) RX ORDER — FENTANYL CITRATE 50 UG/ML
INJECTION, SOLUTION INTRAMUSCULAR; INTRAVENOUS
Status: DISPENSED
Start: 2021-06-23

## (undated) RX ORDER — FENTANYL CITRATE 50 UG/ML
INJECTION, SOLUTION INTRAMUSCULAR; INTRAVENOUS
Status: DISPENSED
Start: 2018-12-31

## (undated) RX ORDER — LIDOCAINE HYDROCHLORIDE 20 MG/ML
SOLUTION OROPHARYNGEAL
Status: DISPENSED
Start: 2020-09-08

## (undated) RX ORDER — FENTANYL CITRATE 50 UG/ML
INJECTION, SOLUTION INTRAMUSCULAR; INTRAVENOUS
Status: DISPENSED
Start: 2020-05-18

## (undated) RX ORDER — LIDOCAINE 40 MG/G
CREAM TOPICAL
Status: DISPENSED
Start: 2020-07-29

## (undated) RX ORDER — ASPIRIN 81 MG/1
TABLET, CHEWABLE ORAL
Status: DISPENSED
Start: 2022-05-18

## (undated) RX ORDER — PROPOFOL 10 MG/ML
INJECTION, EMULSION INTRAVENOUS
Status: DISPENSED
Start: 2020-05-18

## (undated) RX ORDER — FENTANYL CITRATE 50 UG/ML
INJECTION, SOLUTION INTRAMUSCULAR; INTRAVENOUS
Status: DISPENSED
Start: 2020-09-08

## (undated) RX ORDER — SODIUM CHLORIDE 9 MG/ML
INJECTION, SOLUTION INTRAVENOUS
Status: DISPENSED
Start: 2022-05-18

## (undated) RX ORDER — FENTANYL CITRATE 50 UG/ML
INJECTION, SOLUTION INTRAMUSCULAR; INTRAVENOUS
Status: DISPENSED
Start: 2020-09-17

## (undated) RX ORDER — NICARDIPINE HCL-0.9% SOD CHLOR 1 MG/10 ML
SYRINGE (ML) INTRAVENOUS
Status: DISPENSED
Start: 2022-05-18

## (undated) RX ORDER — NICARDIPINE HCL-0.9% SOD CHLOR 1 MG/10 ML
SYRINGE (ML) INTRAVENOUS
Status: DISPENSED
Start: 2023-05-11

## (undated) RX ORDER — LIDOCAINE HYDROCHLORIDE 20 MG/ML
INJECTION, SOLUTION EPIDURAL; INFILTRATION; INTRACAUDAL; PERINEURAL
Status: DISPENSED
Start: 2018-12-31

## (undated) RX ORDER — LIDOCAINE HYDROCHLORIDE 20 MG/ML
JELLY TOPICAL
Status: DISPENSED
Start: 2019-05-03

## (undated) RX ORDER — HEPARIN SODIUM 1000 [USP'U]/ML
INJECTION, SOLUTION INTRAVENOUS; SUBCUTANEOUS
Status: DISPENSED
Start: 2023-05-11

## (undated) RX ORDER — NICARDIPINE HCL-0.9% SOD CHLOR 1 MG/10 ML
SYRINGE (ML) INTRAVENOUS
Status: DISPENSED
Start: 2024-06-07

## (undated) RX ORDER — FENTANYL CITRATE 50 UG/ML
INJECTION, SOLUTION INTRAMUSCULAR; INTRAVENOUS
Status: DISPENSED
Start: 2018-12-07

## (undated) RX ORDER — LIDOCAINE HYDROCHLORIDE 10 MG/ML
INJECTION, SOLUTION EPIDURAL; INFILTRATION; INTRACAUDAL; PERINEURAL
Status: DISPENSED
Start: 2020-09-30

## (undated) RX ORDER — ALBUTEROL SULFATE 0.83 MG/ML
SOLUTION RESPIRATORY (INHALATION)
Status: DISPENSED
Start: 2020-06-01

## (undated) RX ORDER — FENTANYL CITRATE-0.9 % NACL/PF 10 MCG/ML
PLASTIC BAG, INJECTION (ML) INTRAVENOUS
Status: DISPENSED
Start: 2020-09-17

## (undated) RX ORDER — HEPARIN SODIUM 1000 [USP'U]/ML
INJECTION, SOLUTION INTRAVENOUS; SUBCUTANEOUS
Status: DISPENSED
Start: 2024-06-07

## (undated) RX ORDER — DOBUTAMINE HYDROCHLORIDE 200 MG/100ML
INJECTION INTRAVENOUS
Status: DISPENSED
Start: 2025-07-15

## (undated) RX ORDER — HEPARIN SODIUM 1000 [USP'U]/ML
INJECTION, SOLUTION INTRAVENOUS; SUBCUTANEOUS
Status: DISPENSED
Start: 2021-06-23

## (undated) RX ORDER — POTASSIUM CHLORIDE 750 MG/1
TABLET, EXTENDED RELEASE ORAL
Status: DISPENSED
Start: 2023-05-11

## (undated) RX ORDER — PROPOFOL 10 MG/ML
INJECTION, EMULSION INTRAVENOUS
Status: DISPENSED
Start: 2018-12-31

## (undated) RX ORDER — LIDOCAINE 40 MG/G
CREAM TOPICAL
Status: DISPENSED
Start: 2022-05-18

## (undated) RX ORDER — LIDOCAINE 40 MG/G
CREAM TOPICAL
Status: DISPENSED
Start: 2020-10-29

## (undated) RX ORDER — DIPHENHYDRAMINE HYDROCHLORIDE 50 MG/ML
INJECTION INTRAMUSCULAR; INTRAVENOUS
Status: DISPENSED
Start: 2021-06-23

## (undated) RX ORDER — FENTANYL CITRATE 50 UG/ML
INJECTION, SOLUTION INTRAMUSCULAR; INTRAVENOUS
Status: DISPENSED
Start: 2024-06-07

## (undated) RX ORDER — ACETAMINOPHEN 325 MG/1
TABLET ORAL
Status: DISPENSED
Start: 2020-05-18

## (undated) RX ORDER — HEPARIN SODIUM 1000 [USP'U]/ML
INJECTION, SOLUTION INTRAVENOUS; SUBCUTANEOUS
Status: DISPENSED
Start: 2022-05-18

## (undated) RX ORDER — SODIUM CHLORIDE 9 MG/ML
INJECTION, SOLUTION INTRAVENOUS
Status: DISPENSED
Start: 2020-07-14

## (undated) RX ORDER — GLYCOPYRROLATE 0.2 MG/ML
INJECTION, SOLUTION INTRAMUSCULAR; INTRAVENOUS
Status: DISPENSED
Start: 2018-12-31

## (undated) RX ORDER — LIDOCAINE 40 MG/G
CREAM TOPICAL
Status: DISPENSED
Start: 2020-08-26

## (undated) RX ORDER — NITROGLYCERIN 5 MG/ML
VIAL (ML) INTRAVENOUS
Status: DISPENSED
Start: 2024-06-07

## (undated) RX ORDER — PROTAMINE SULFATE 10 MG/ML
INJECTION, SOLUTION INTRAVENOUS
Status: DISPENSED
Start: 2018-12-31

## (undated) RX ORDER — LIDOCAINE 40 MG/G
CREAM TOPICAL
Status: DISPENSED
Start: 2020-06-08

## (undated) RX ORDER — DIPHENHYDRAMINE HYDROCHLORIDE 50 MG/ML
INJECTION INTRAMUSCULAR; INTRAVENOUS
Status: DISPENSED
Start: 2020-09-08

## (undated) RX ORDER — PROPOFOL 10 MG/ML
INJECTION, EMULSION INTRAVENOUS
Status: DISPENSED
Start: 2020-09-17

## (undated) RX ORDER — FENTANYL CITRATE 50 UG/ML
INJECTION, SOLUTION INTRAMUSCULAR; INTRAVENOUS
Status: DISPENSED
Start: 2022-05-18

## (undated) RX ORDER — LIDOCAINE HYDROCHLORIDE 10 MG/ML
INJECTION, SOLUTION EPIDURAL; INFILTRATION; INTRACAUDAL; PERINEURAL
Status: DISPENSED
Start: 2019-01-05

## (undated) RX ORDER — LIDOCAINE HYDROCHLORIDE 10 MG/ML
INJECTION, SOLUTION EPIDURAL; INFILTRATION; INTRACAUDAL; PERINEURAL
Status: DISPENSED
Start: 2020-09-15

## (undated) RX ORDER — FENTANYL CITRATE 50 UG/ML
INJECTION, SOLUTION INTRAMUSCULAR; INTRAVENOUS
Status: DISPENSED
Start: 2020-09-15

## (undated) RX ORDER — ALBUTEROL SULFATE 0.83 MG/ML
SOLUTION RESPIRATORY (INHALATION)
Status: DISPENSED
Start: 2020-06-30

## (undated) RX ORDER — IOPAMIDOL 408 MG/ML
INJECTION, SOLUTION INTRATHECAL
Status: DISPENSED
Start: 2020-09-15

## (undated) RX ORDER — MAGNESIUM SULFATE HEPTAHYDRATE 40 MG/ML
INJECTION, SOLUTION INTRAVENOUS
Status: DISPENSED
Start: 2020-07-14

## (undated) RX ORDER — LIDOCAINE HYDROCHLORIDE 20 MG/ML
SOLUTION OROPHARYNGEAL
Status: DISPENSED
Start: 2020-09-14

## (undated) RX ORDER — LIDOCAINE HYDROCHLORIDE 10 MG/ML
INJECTION, SOLUTION EPIDURAL; INFILTRATION; INTRACAUDAL; PERINEURAL
Status: DISPENSED
Start: 2018-11-13

## (undated) RX ORDER — LIDOCAINE HYDROCHLORIDE 10 MG/ML
INJECTION, SOLUTION EPIDURAL; INFILTRATION; INTRACAUDAL; PERINEURAL
Status: DISPENSED
Start: 2019-07-05

## (undated) RX ORDER — LIDOCAINE 40 MG/G
CREAM TOPICAL
Status: DISPENSED
Start: 2020-09-30

## (undated) RX ORDER — ESMOLOL HYDROCHLORIDE 10 MG/ML
INJECTION INTRAVENOUS
Status: DISPENSED
Start: 2018-12-31

## (undated) RX ORDER — ASPIRIN 81 MG/1
TABLET, CHEWABLE ORAL
Status: DISPENSED
Start: 2023-05-11

## (undated) RX ORDER — LIDOCAINE 40 MG/G
CREAM TOPICAL
Status: DISPENSED
Start: 2020-07-14

## (undated) RX ORDER — SODIUM CHLORIDE 9 MG/ML
INJECTION, SOLUTION INTRAVENOUS
Status: DISPENSED
Start: 2023-05-11

## (undated) RX ORDER — LIDOCAINE HYDROCHLORIDE 10 MG/ML
INJECTION, SOLUTION EPIDURAL; INFILTRATION; INTRACAUDAL; PERINEURAL
Status: DISPENSED
Start: 2020-09-08

## (undated) RX ORDER — PENTAMIDINE ISETHIONATE 300 MG/300MG
INHALANT RESPIRATORY (INHALATION)
Status: DISPENSED
Start: 2020-07-29

## (undated) RX ORDER — SODIUM CHLORIDE 9 MG/ML
INJECTION, SOLUTION INTRAVENOUS
Status: DISPENSED
Start: 2020-09-17

## (undated) RX ORDER — FENTANYL CITRATE 50 UG/ML
INJECTION, SOLUTION INTRAMUSCULAR; INTRAVENOUS
Status: DISPENSED
Start: 2023-05-11

## (undated) RX ORDER — LIDOCAINE 40 MG/G
CREAM TOPICAL
Status: DISPENSED
Start: 2023-05-11

## (undated) RX ORDER — LIDOCAINE 40 MG/G
CREAM TOPICAL
Status: DISPENSED
Start: 2019-07-05

## (undated) RX ORDER — CEFAZOLIN SODIUM 1 G/3ML
INJECTION, POWDER, FOR SOLUTION INTRAMUSCULAR; INTRAVENOUS
Status: DISPENSED
Start: 2020-05-18

## (undated) RX ORDER — NITROGLYCERIN 5 MG/ML
VIAL (ML) INTRAVENOUS
Status: DISPENSED
Start: 2023-05-11

## (undated) RX ORDER — LIDOCAINE 40 MG/G
CREAM TOPICAL
Status: DISPENSED
Start: 2024-06-07

## (undated) RX ORDER — LIDOCAINE HYDROCHLORIDE 10 MG/ML
INJECTION, SOLUTION EPIDURAL; INFILTRATION; INTRACAUDAL; PERINEURAL
Status: DISPENSED
Start: 2021-06-23

## (undated) RX ORDER — HEPARIN SODIUM 1000 [USP'U]/ML
INJECTION, SOLUTION INTRAVENOUS; SUBCUTANEOUS
Status: DISPENSED
Start: 2018-12-31

## (undated) RX ORDER — LIDOCAINE HYDROCHLORIDE 10 MG/ML
INJECTION, SOLUTION EPIDURAL; INFILTRATION; INTRACAUDAL; PERINEURAL
Status: DISPENSED
Start: 2020-08-26

## (undated) RX ORDER — ETOMIDATE 2 MG/ML
INJECTION INTRAVENOUS
Status: DISPENSED
Start: 2018-12-31

## (undated) RX ORDER — NITROGLYCERIN 5 MG/ML
VIAL (ML) INTRAVENOUS
Status: DISPENSED
Start: 2022-05-18

## (undated) RX ORDER — METOPROLOL TARTRATE 1 MG/ML
INJECTION, SOLUTION INTRAVENOUS
Status: DISPENSED
Start: 2025-07-15

## (undated) RX ORDER — PENTAMIDINE ISETHIONATE 300 MG/300MG
INHALANT RESPIRATORY (INHALATION)
Status: DISPENSED
Start: 2020-06-30

## (undated) RX ORDER — ATROPINE SULFATE 0.4 MG/ML
AMPUL (ML) INJECTION
Status: DISPENSED
Start: 2025-07-15

## (undated) RX ORDER — CALCIUM CHLORIDE 100 MG/ML
INJECTION INTRAVENOUS; INTRAVENTRICULAR
Status: DISPENSED
Start: 2018-12-31

## (undated) RX ORDER — CEFAZOLIN SODIUM 1 G/3ML
INJECTION, POWDER, FOR SOLUTION INTRAMUSCULAR; INTRAVENOUS
Status: DISPENSED
Start: 2018-12-31

## (undated) RX ORDER — LIDOCAINE 40 MG/G
CREAM TOPICAL
Status: DISPENSED
Start: 2021-10-26

## (undated) RX ORDER — LIDOCAINE HYDROCHLORIDE 10 MG/ML
INJECTION, SOLUTION EPIDURAL; INFILTRATION; INTRACAUDAL; PERINEURAL
Status: DISPENSED
Start: 2020-07-29

## (undated) RX ORDER — PIPERACILLIN SODIUM, TAZOBACTAM SODIUM 3; .375 G/15ML; G/15ML
INJECTION, POWDER, LYOPHILIZED, FOR SOLUTION INTRAVENOUS
Status: DISPENSED
Start: 2020-09-17

## (undated) RX ORDER — LIDOCAINE HYDROCHLORIDE 10 MG/ML
INJECTION, SOLUTION EPIDURAL; INFILTRATION; INTRACAUDAL; PERINEURAL
Status: DISPENSED
Start: 2020-05-22

## (undated) RX ORDER — SODIUM CHLORIDE 9 MG/ML
INJECTION, SOLUTION INTRAVENOUS
Status: DISPENSED
Start: 2024-06-07

## (undated) RX ORDER — LIDOCAINE HYDROCHLORIDE 20 MG/ML
SOLUTION OROPHARYNGEAL
Status: DISPENSED
Start: 2020-09-15

## (undated) RX ORDER — LIDOCAINE 40 MG/G
CREAM TOPICAL
Status: DISPENSED
Start: 2020-06-15

## (undated) RX ORDER — LIDOCAINE 40 MG/G
CREAM TOPICAL
Status: DISPENSED
Start: 2020-08-13